# Patient Record
Sex: FEMALE | Race: WHITE | NOT HISPANIC OR LATINO | Employment: OTHER | URBAN - NONMETROPOLITAN AREA
[De-identification: names, ages, dates, MRNs, and addresses within clinical notes are randomized per-mention and may not be internally consistent; named-entity substitution may affect disease eponyms.]

---

## 2015-04-03 LAB — LDLC SERPL CALC-MCNC: 79 MG/DL

## 2016-11-11 LAB
CREAT SERPL-MCNC: 1.12 MG/DL (ref 0.4–1)
GLUCOSE SERPL-MCNC: 185 MG/DL (ref 70–100)
POTASSIUM SERPL-SCNC: 4.2 MEQ/L (ref 3.4–5.1)

## 2017-01-19 ENCOUNTER — HOSPITAL ENCOUNTER (EMERGENCY)
Facility: HOSPITAL | Age: 52
Discharge: HOME | End: 2017-01-19
Attending: NURSE PRACTITIONER | Admitting: NURSE PRACTITIONER
Payer: MEDICARE

## 2017-01-19 VITALS
SYSTOLIC BLOOD PRESSURE: 149 MMHG | DIASTOLIC BLOOD PRESSURE: 76 MMHG | RESPIRATION RATE: 18 BRPM | HEART RATE: 99 BPM | TEMPERATURE: 98.2 F | OXYGEN SATURATION: 94 %

## 2017-01-19 DIAGNOSIS — R21 RASH: ICD-10-CM

## 2017-01-19 PROCEDURE — 99213 OFFICE O/P EST LOW 20 MIN: CPT | Performed by: NURSE PRACTITIONER

## 2017-01-19 PROCEDURE — 99212 OFFICE O/P EST SF 10 MIN: CPT

## 2017-01-19 RX ORDER — TRIAMCINOLONE ACETONIDE 1 MG/G
CREAM TOPICAL
Qty: 80 G | Refills: 0 | Status: SHIPPED | OUTPATIENT
Start: 2017-01-19 | End: 2017-02-02

## 2017-01-19 ASSESSMENT — ENCOUNTER SYMPTOMS
VOMITING: 0
APPETITE CHANGE: 0
ACTIVITY CHANGE: 0
NAUSEA: 0
FEVER: 0
PSYCHIATRIC NEGATIVE: 1
DYSURIA: 0
CHILLS: 0

## 2017-01-19 NOTE — ED PROVIDER NOTES
History     Chief Complaint   Patient presents with     Rash     The history is provided by the patient and a caregiver. No  was used.     Marly Cannon is a 51 year old female who presents with a rash to bilateral forearms. Rash started 3 weeks ago. She saw her PCP and was started on Hydrocortisone cream twice a day. 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 She doesn't always wear long sleeves and feels that may be a reason for irritation. Denies new lotions, creams, detergents, or foods. Rash is itchy. Denies fever, chills, or night sweats. Eating and drinking well. Bowel and bladder are working well.     I have reviewed the Medications, Allergies, Past Medical and Surgical History, and Social History in the Epic system.    Review of Systems   Constitutional: Negative for fever, chills, activity change and appetite change.   Gastrointestinal: Negative for nausea and vomiting.   Genitourinary: Negative for dysuria.   Skin: Positive for rash.   Psychiatric/Behavioral: Negative.        Physical Exam   BP: 149/76 mmHg  Pulse: 99  Temp: 98.2  F (36.8  C)  Resp: 18  SpO2: 94 %  Physical Exam   Constitutional: She is oriented to person, place, and time. She appears well-developed and well-nourished. No distress.   HENT:   Mouth/Throat: Oropharynx is clear and moist.   Neck: Normal range of motion. Neck supple.   Cardiovascular: Normal rate, regular rhythm, normal heart sounds and intact distal pulses.    Pulmonary/Chest: Effort normal. No respiratory distress. She has no wheezes. She has no rales.   Abdominal: Soft. She exhibits no distension.   Musculoskeletal: Normal range of motion.   Neurological: She is alert and oriented to person, place, and time.   Skin: Skin is warm and dry. Rash noted. She is not diaphoretic.   Papular erythema diffuse rash to bilateral posterior forearms. No drainage or warmth to the touch. Abrasions noted from scratching.    Psychiatric: She has a normal mood and affect.  Her behavior is normal.   Nursing note and vitals reviewed.      ED Course   Procedures      Assessments & Plan (with Medical Decision Making)     I have reviewed the nursing notes.    I have reviewed the findings, diagnosis, plan and need for follow up with the patient.  Discharged instable condition.     Discharge Medication List as of 1/19/2017 10:34 AM      START taking these medications    Details   triamcinolone (KENALOG) 0.1 % cream 80 gramsDisp-80 g, [id number]             Final diagnoses:   Rash     Stop using hydrocortisone cream.   Use Triamcinolone cream daily. Apply a thin layer.   You can take tylenol and or ibuprofen for pain.   Follow up with PCP in 1 week for evaluation.   Return to urgent care or emergency department with an increase in symptoms or concerns.     SHANI Anand  1/19/2017  10:22 AM  URGENT CARE CLINIC        Makenna Aceves NP  01/22/17 1800

## 2017-01-19 NOTE — ED AVS SNAPSHOT
[facility]    [address] [address]    Phone:  [phone]                                       Marly Cannon   MRN: [id number]    Department:  [facility]   Date of Visit:  1/19/2017           Patient Information     Date Of Birth          1965        Your diagnoses for this visit were:     Rash        You were seen by Makenna Aceves NP.      Follow-up Information     Follow up with Amberly Whyte NP In 1 week.    Why:  For re-evaluation.     Contact information:     [facility]  [address] [place] [address] [id number] [phone]          Follow up with [facility].    Specialty:  EMERGENCY MEDICINE    Why:  As needed, If symptoms worsen    Contact information:    [address] [address] [phone]    Additional information:    From [facility]: Take [address]. Turn left at [address]. Turn left at the first stoplight on [address] [place] [address]. At the first stop sign, take a right onto [address]. Take a left into the parking lot and continue through until you reach the North enterance of the building.       From [place]: Take [address]. Take the [address] ramp towards [place]. Turn left onto [address]. Take a slight right onto [address]. Turn left at the first stoplight on [address]. At the first stop sign, take a right onto [address]. Take a left into the parking lot and continue through until you reach the North enterance of the building.       From [place]: Take [address]. Take a right at [address]. At the first stop sign, take a right onto [address]. Take a left into the parking lot and continue through until you reach the North enterance of the building.         Discharge Instructions       Stop using hydrocortisone cream.   Use Triamcinolone cream daily. Apply a thin layer.   You can take tylenol and or ibuprofen for  pain.   Follow up with PCP in 1 week for evaluation.   Return to urgent care or emergency department with an increase in symptoms or concerns.     Discharge References/Attachments     SKIN RASHES, SELF-CARE FOR (ENGLISH)      Future Appointments        Provider Department Dept Phone Center    3/10/2017 9:00 AM Courtney Chaudhary NP [facility] [phone] [place]         Review of your medicines      START taking        Dose / Directions Last dose taken    triamcinolone 0.1 % cream   Commonly known as:  KENALOG   Quantity:  80 g        80 grams   Refills:  0          Our records show that you are taking the medicines listed below. If these are incorrect, please call your family doctor or clinic.        Dose / Directions Last dose taken    ACTOS 15 MG tablet   Dose:  15 mg   Generic drug:  pioglitazone        Take 15 mg by mouth daily   Refills:  0        AMLODIPINE BESYLATE PO   Dose:  2.5 mg        Take 2.5 mg by mouth daily   Refills:  0        ASPIRIN EC PO   Dose:  81 mg        Take 81 mg by mouth daily   Refills:  0        CLARITIN PO        Refills:  0        DEPO-PROVERA IM        Refills:  0        GLIPIZIDE XL PO   Dose:  5 mg        Take 5 mg by mouth 2 times daily   Refills:  0        HYDROCHLOROTHIAZIDE PO   Dose:  25 mg        Take 25 mg by mouth daily   Refills:  0        LISINOPRIL PO   Dose:  40 mg        Take 40 mg by mouth daily   Refills:  0        metFORMIN 500 MG tablet   Commonly known as:  GLUCOPHAGE   Dose:  500 mg        Take 500 mg by mouth 2 times daily (with meals)   Refills:  0        mometasone-formoterol 100-5 MCG/ACT oral inhaler   Commonly known as:  DULERA   Dose:  2 puff        Inhale 2 puffs into the lungs as needed   Refills:  0        predniSONE 20 MG tablet   Commonly known as:  DELTASONE   Quantity:  5 tablet        Take 1 tablet daily for 5 days   Refills:  0        PRIMIDONE PO   Dose:  50 mg        Take 50 mg by mouth At Bedtime   Refills:  0         "SIMVASTATIN PO   Dose:  40 mg        Take 40 mg by mouth At Bedtime   Refills:  0        TYLENOL PO        Refills:  0        VITAMIN D3 PO   Dose:  2000 Units        Take 2,000 Units by mouth daily   Refills:  0                Prescriptions were sent or printed at these locations (1 Prescription)                   [facility] - [address]   [address] [address]    Telephone:  [phone]   Fax:  [phone]   Hours:                  E-Prescribed (1 of 1)         triamcinolone (KENALOG) 0.1 % cream                Orders Needing Specimen Collection     None      Pending Results     No orders found from 2017 to 2017.            Pending Culture Results     No orders found from 2017 to 2017.            Thank you for choosing [facility]       Thank you for choosing [facility] for your care. Our goal is always to provide you with excellent care. Hearing back from our patients is one way we can continue to improve our services. Please take a few minutes to complete the written survey that you may receive in the mail after you visit with us. Thank you!        Quvium Information     Quvium lets you send messages to your doctor, view your test results, renew your prescriptions, schedule appointments and more. To sign up, go to [email or web address] . Click on \"Log in\" on the left side of the screen, which will take you to the Welcome page. Then click on \"Sign up Now\" on the right side of the page.     You will be asked to enter the access code listed below, as well as some personal information. Please follow the directions to create your username and password.     Your access code is: [email or web address]  Expires: 2017 10:34 AM     Your access code will  in 90 days. If you need help or a new code, please call your [facility] clinic or [phone].        Care EveryWhere ID     This is your Care EveryWhere ID. This could be used by other organizations to " access your [place] medical records  [id number]        After Visit Summary       This is your record. Keep this with you and show to your community pharmacist(s) and doctor(s) at your next visit.

## 2017-01-19 NOTE — ED AVS SNAPSHOT
[facility]    [address] [address]    Phone:  [phone]                                       Marly Cannno   MRN: [id number]    Department:  [facility]   Date of Visit:  1/19/2017           After Visit Summary Signature Page     I have received my discharge instructions, and my questions have been answered. I have discussed any challenges I see with this plan with the nurse or doctor.    ..........................................................................................................................................  Patient/Patient Representative Signature      ..........................................................................................................................................  Patient Representative Print Name and Relationship to Patient    ..................................................               ................................................  Date                                            Time    ..........................................................................................................................................  Reviewed by Signature/Title    ...................................................              ..............................................  Date                                                            Time

## 2017-01-22 NOTE — DISCHARGE INSTRUCTIONS
Stop using hydrocortisone cream.   Use Triamcinolone cream daily. Apply a thin layer.   You can take tylenol and or ibuprofen for pain.   Follow up with PCP in 1 week for evaluation.   Return to urgent care or emergency department with an increase in symptoms or concerns.

## 2017-02-10 ENCOUNTER — TRANSFERRED RECORDS (OUTPATIENT)
Dept: HEALTH INFORMATION MANAGEMENT | Facility: HOSPITAL | Age: 52
End: 2017-02-10

## 2017-02-10 LAB
CHOLEST SERPL-MCNC: 165 MG/DL (ref 114–200)
HBA1C MFR BLD: 6.9 % (ref 4–6)
HBA1C MFR BLD: 7.7 % (ref 0–5.7)
HDLC SERPL-MCNC: 44 MG/DL (ref 40–60)
TRIGL SERPL-MCNC: 155 MG/DL (ref 10–200)

## 2017-02-28 ENCOUNTER — TELEPHONE (OUTPATIENT)
Dept: WOUND CARE | Facility: OTHER | Age: 52
End: 2017-02-28

## 2017-02-28 NOTE — TELEPHONE ENCOUNTER
Called [facility] and spoke to Jackelyn.      Marly will see Kat Dawn RN CDE tomorrow (3/1/17) at 0900.      They are aware to arrive 15 minutes prior to her appointment.      Courtney Chaudhary RN FNP-BC  Disease Management

## 2017-02-28 NOTE — TELEPHONE ENCOUNTER
[facility] stated he was suposed to call if levels went over 200.  He reports patient's levels are 205. Please call

## 2017-03-01 ENCOUNTER — HOSPITAL ENCOUNTER (OUTPATIENT)
Dept: EDUCATION SERVICES | Facility: HOSPITAL | Age: 52
Discharge: HOME | End: 2017-03-01
Attending: NURSE PRACTITIONER | Admitting: NURSE PRACTITIONER
Payer: MEDICARE

## 2017-03-01 VITALS
WEIGHT: 221 LBS | BODY MASS INDEX: 37.73 KG/M2 | SYSTOLIC BLOOD PRESSURE: 123 MMHG | HEIGHT: 64 IN | OXYGEN SATURATION: 89 % | HEART RATE: 94 BPM | DIASTOLIC BLOOD PRESSURE: 85 MMHG

## 2017-03-01 PROCEDURE — G0108 DIAB MANAGE TRN  PER INDIV: HCPCS

## 2017-03-01 ASSESSMENT — ANXIETY QUESTIONNAIRES
GAD7 TOTAL SCORE: 6
5. BEING SO RESTLESS THAT IT IS HARD TO SIT STILL: NEARLY EVERY DAY
2. NOT BEING ABLE TO STOP OR CONTROL WORRYING: NOT AT ALL
3. WORRYING TOO MUCH ABOUT DIFFERENT THINGS: NOT AT ALL
7. FEELING AFRAID AS IF SOMETHING AWFUL MIGHT HAPPEN: NOT AT ALL
1. FEELING NERVOUS, ANXIOUS, OR ON EDGE: NEARLY EVERY DAY
IF YOU CHECKED OFF ANY PROBLEMS ON THIS QUESTIONNAIRE, HOW DIFFICULT HAVE THESE PROBLEMS MADE IT FOR YOU TO DO YOUR WORK, TAKE CARE OF THINGS AT HOME, OR GET ALONG WITH OTHER PEOPLE: VERY DIFFICULT
6. BECOMING EASILY ANNOYED OR IRRITABLE: NOT AT ALL

## 2017-03-01 ASSESSMENT — PATIENT HEALTH QUESTIONNAIRE - PHQ9: 5. POOR APPETITE OR OVEREATING: NOT AT ALL

## 2017-03-01 ASSESSMENT — PAIN SCALES - GENERAL: PAINLEVEL: NO PAIN (0)

## 2017-03-01 NOTE — PROGRESS NOTES
"Marly is here for a diabetes review. /85 (BP Location: Right arm, Patient Position: Chair, Cuff Size: Adult Large)  Pulse 94  Ht 1.626 m (5' 4\")  Wt 100.2 kg (221 lb)  SpO2 (!) 88%  BMI 37.93 kg/m2  She is accompanied by her worker, Nancy. Marly is here as BG readings have been creeping upward. She is taking Metformin 1000 mg bid, Actos 15 mg daily and Glipizide recently increased 2/10/17 to 10 mg bid. This appointment was scheduled as BG readings have continued to trend upward. Readings range as follows: fastin-182, 205, pre-lunch: 174 and post-supper 177. FBG levels for the last week have been 150 or above. Leonor mejias  Lab Results   Component Value Date    A1C 7.7 02/10/2017    A1C 6.6 2016    A1C 6.8 2016    A1C 6.5 10/30/2015    A1C 8.1 2015     Of note is that Marly's O2 saturation is at 88%. Recheck done at the end of visit and it is at 88 to 90%. Her lips are slightly blue in color. She does continue to smoke.    Review of eating:  Seldom eats breakfast - will have coffee with artificial sweetener with creamer that contains sugar  Lunch is at work: usually Hot Pockets with water or pop  Supper: pasta with meat sauce or tuna salad with noodles, drinks water  States has stopped snacking  Beverages are water and 2 pops per day. She is working on decreasing to one pop a day.    Did discuss some suggested changes for lunch selections at work that may have have less carbs and salt. Nancy states that she can help Marly with this. I did give Marly a diabetes cookbook and Nancy a website for some food suggestions and recipes for Marly to use for cooking meals. Also encouraged her to keep working on decreasing/stopping pop.    I did ask Marly to keep food logs for our next visit.    With fasting BG increasing, I will contact Amberly Whyte about a medication change. Increase Actos? Or add basal insulin?    Will follow with Marly in one month.    Time spent with " patient 45 minutes.

## 2017-03-02 ASSESSMENT — PATIENT HEALTH QUESTIONNAIRE - PHQ9: SUM OF ALL RESPONSES TO PHQ QUESTIONS 1-9: 3

## 2017-03-02 ASSESSMENT — ANXIETY QUESTIONNAIRES: GAD7 TOTAL SCORE: 6

## 2017-03-09 ENCOUNTER — TELEPHONE (OUTPATIENT)
Dept: EDUCATION SERVICES | Facility: HOSPITAL | Age: 52
End: 2017-03-09

## 2017-03-09 DIAGNOSIS — E11.65 TYPE 2 DIABETES MELLITUS WITH HYPERGLYCEMIA, WITHOUT LONG-TERM CURRENT USE OF INSULIN (H): Primary | ICD-10-CM

## 2017-03-09 RX ORDER — PIOGLITAZONEHYDROCHLORIDE 30 MG/1
30 TABLET ORAL DAILY
Qty: 30 TABLET | Refills: 3 | Status: SHIPPED
Start: 2017-03-09 | End: 2017-12-05

## 2017-03-09 NOTE — TELEPHONE ENCOUNTER
Received faxed, signed order from Amberly Whyte NP, to increase Marly's Actos to 30 mg daily. Notified Marly by phone and Nancy by fax.

## 2017-06-06 DIAGNOSIS — E11.65 TYPE 2 DIABETES MELLITUS WITH HYPERGLYCEMIA, WITHOUT LONG-TERM CURRENT USE OF INSULIN (H): ICD-10-CM

## 2017-06-06 RX ORDER — PIOGLITAZONEHYDROCHLORIDE 30 MG/1
30 TABLET ORAL DAILY
Qty: 30 TABLET | Refills: 3 | Status: CANCELLED | OUTPATIENT
Start: 2017-06-06

## 2017-06-06 NOTE — TELEPHONE ENCOUNTER
Last office visit 09/09/16.  Actos last filled 05/16/17 #30. Patient due for diabetic follow up. No appointment made in EPIC. Please advise. Medication pended.

## 2017-06-06 NOTE — TELEPHONE ENCOUNTER
Sent message to [facility] to schedule patient for Diabetes Ed appointment.

## 2017-06-15 ENCOUNTER — TELEPHONE (OUTPATIENT)
Dept: WOUND CARE | Facility: OTHER | Age: 52
End: 2017-06-15

## 2017-06-15 NOTE — TELEPHONE ENCOUNTER
----- Message from Kat Dawn RN sent at 6/6/2017  2:22 PM CDT -----  Regarding: Schedule patient appointment  Needs refill for diabetes medication. Please call patient to schedule appointment with me for diabetes review so we can refill her prescription.

## 2017-06-15 NOTE — TELEPHONE ENCOUNTER
After several attempts of calling the patient with no answer/invalid phone number mailed out an appointment reminder for the patient to call to set up this appointment.

## 2017-07-10 ENCOUNTER — TRANSFERRED RECORDS (OUTPATIENT)
Dept: HEALTH INFORMATION MANAGEMENT | Facility: HOSPITAL | Age: 52
End: 2017-07-10

## 2017-07-10 LAB — HBA1C MFR BLD: 7.3 % (ref 4–6)

## 2017-08-01 ENCOUNTER — TRANSFERRED RECORDS (OUTPATIENT)
Dept: HEALTH INFORMATION MANAGEMENT | Facility: HOSPITAL | Age: 52
End: 2017-08-01

## 2017-08-03 ENCOUNTER — TELEPHONE (OUTPATIENT)
Dept: EDUCATION SERVICES | Facility: HOSPITAL | Age: 52
End: 2017-08-03

## 2017-08-03 DIAGNOSIS — E11.65 TYPE 2 DIABETES MELLITUS WITH HYPERGLYCEMIA, WITHOUT LONG-TERM CURRENT USE OF INSULIN (H): Primary | ICD-10-CM

## 2017-08-10 ENCOUNTER — HOSPITAL ENCOUNTER (OUTPATIENT)
Dept: EDUCATION SERVICES | Facility: HOSPITAL | Age: 52
Discharge: HOME | End: 2017-08-10
Attending: NURSE PRACTITIONER | Admitting: NURSE PRACTITIONER
Payer: MEDICARE

## 2017-08-10 VITALS
HEART RATE: 82 BPM | OXYGEN SATURATION: 91 % | SYSTOLIC BLOOD PRESSURE: 123 MMHG | WEIGHT: 220 LBS | DIASTOLIC BLOOD PRESSURE: 84 MMHG | BODY MASS INDEX: 37.56 KG/M2 | HEIGHT: 64 IN

## 2017-08-10 PROCEDURE — G0108 DIAB MANAGE TRN  PER INDIV: HCPCS

## 2017-08-10 ASSESSMENT — PAIN SCALES - GENERAL: PAINLEVEL: NO PAIN (0)

## 2017-08-10 NOTE — PROGRESS NOTES
"Marly is here for diabetes review and a possible insulin start. /84 (BP Location: Right arm, Patient Position: Chair)  Pulse 82  Ht 1.626 m (5' 4\")  Wt 99.8 kg (220 lb)  SpO2 (!) 91%  BMI 37.76 kg/m2 She is accompanied by Nancy who helps her with her self cares. Marly is taking Metformin 500 mg bid, Actos 30 mg daily and Glipizide 5 mg bid. Readings range as follows: fastin-165 (average 127) and pre-supper: 91, 135, 146-184.    Last A1C: 7/10/17: 7.3    17 Creatinine: 1.35 and GFR: 43.    Marly's provider, Amberly Whyte NP, is looking at starting Marly on a basal insulin.    Marly has been working on her food choices and portions. Reviewed her food log. Breakfast: cereal/milk or oatmeal/toast/coffee, lunch: Dinty Domínguez stew/ 2 slices of bread or TV dinner/diet coke. Evening meal: hot dog with mac & cheese, spam sandwich/sprite or corned beef hash/diet Mt Dew. Vanilla pudding for bedtime snack.    I did instruct Marly how to inject insulin. Instructed on storage of insulin, disposal of insulin pen needles, injection site selection/rotation, how to prepare pen. Marly was somewhat apprehensive but was able to do a return demonstration with minimal prompting.    Discussed with Marly and Nancy that I will contact Amberly Whyte NP, about the plan we will take.  We may need to stop Metformin for about a week before starting insulin.    Will follow with Marly once I talk with Najma Whyte NP.    Time spent with patient 45 minutes  "

## 2017-08-10 NOTE — NURSING NOTE
"Chief Complaint   Patient presents with     Diabetes     annual       Initial /84 (BP Location: Right arm, Patient Position: Chair)  Pulse 82  Ht 1.626 m (5' 4\")  Wt 99.8 kg (220 lb)  SpO2 (!) 91%  BMI 37.76 kg/m2 Estimated body mass index is 37.76 kg/(m^2) as calculated from the following:    Height as of this encounter: 1.626 m (5' 4\").    Weight as of this encounter: 99.8 kg (220 lb).  Medication Reconciliation: complete   Lisa Back      "

## 2017-09-01 ENCOUNTER — TELEPHONE (OUTPATIENT)
Dept: EDUCATION SERVICES | Facility: HOSPITAL | Age: 52
End: 2017-09-01

## 2017-09-01 NOTE — TELEPHONE ENCOUNTER
Kat received orders from patients primary provider, Amberly Whyte.  Diabetic nurse was attempting to reach Nancy- worker from [facility] to give her the information. Had to leave message to return call to Diabetic Education.

## 2017-10-16 ENCOUNTER — TELEPHONE (OUTPATIENT)
Dept: EDUCATION SERVICES | Facility: HOSPITAL | Age: 52
End: 2017-10-16

## 2017-10-16 DIAGNOSIS — E11.65 TYPE 2 DIABETES MELLITUS WITH HYPERGLYCEMIA, WITHOUT LONG-TERM CURRENT USE OF INSULIN (H): ICD-10-CM

## 2017-10-16 NOTE — TELEPHONE ENCOUNTER
Calling to speak to Kat regarding going on an insulin needle and getting off of her Metformin. Patient primary told her to contact Kat.

## 2017-10-19 NOTE — TELEPHONE ENCOUNTER
Called Marly. She had been worried about high BG readings on 10/14/17 that were in the 200s during the day but were down to 159 before bed. Readings since have been in 100s.    Discussed with her that we can wait until she has lab work again with Amberly Whyte NP, before we discuss insulin again. I will contact Amberly regarding seeing Marly after next A1C/Creat labs

## 2017-11-21 ENCOUNTER — TRANSFERRED RECORDS (OUTPATIENT)
Dept: HEALTH INFORMATION MANAGEMENT | Facility: HOSPITAL | Age: 52
End: 2017-11-21

## 2017-11-21 ENCOUNTER — TELEPHONE (OUTPATIENT)
Dept: EDUCATION SERVICES | Facility: HOSPITAL | Age: 52
End: 2017-11-21

## 2017-11-21 LAB
CREAT SERPL-MCNC: 1.47 MG/DL (ref 0.4–1)
GFR SERPL CREATININE-BSD FRML MDRD: 39 ML/MIN/1.73M2
GLUCOSE SERPL-MCNC: 142 MG/DL (ref 70–100)
HBA1C MFR BLD: 6.9 % (ref 4–6)
POTASSIUM SERPL-SCNC: 4.2 MEQ/L (ref 3.4–5.1)

## 2017-11-21 NOTE — TELEPHONE ENCOUNTER
Najma Whyte NP wants Kat Dawn to call her back on Wednesday. She wants Marly to get off of Metformin. Najma Whyte will faxing her note from today to Kat. Patient is set up to see Kat on Tuesday 11/28/17.

## 2017-11-23 NOTE — TELEPHONE ENCOUNTER
Called Amberly Whyte NP, will plan for Marly to start insulin. Appointment scheduled for next week.

## 2017-11-28 ENCOUNTER — TELEPHONE (OUTPATIENT)
Dept: EDUCATION SERVICES | Facility: HOSPITAL | Age: 52
End: 2017-11-28

## 2017-11-28 NOTE — TELEPHONE ENCOUNTER
Marly was a no show for her appointment today. She is to start insulin. Please call her to reschedule. Thanks!

## 2017-12-05 ENCOUNTER — HOSPITAL ENCOUNTER (OUTPATIENT)
Dept: EDUCATION SERVICES | Facility: HOSPITAL | Age: 52
Discharge: HOME | End: 2017-12-05
Attending: NURSE PRACTITIONER | Admitting: NURSE PRACTITIONER
Payer: MEDICARE

## 2017-12-05 VITALS
HEIGHT: 64 IN | HEART RATE: 88 BPM | DIASTOLIC BLOOD PRESSURE: 76 MMHG | OXYGEN SATURATION: 88 % | SYSTOLIC BLOOD PRESSURE: 114 MMHG | BODY MASS INDEX: 36.57 KG/M2 | WEIGHT: 214.2 LBS

## 2017-12-05 DIAGNOSIS — E11.65 TYPE 2 DIABETES MELLITUS WITH HYPERGLYCEMIA, WITHOUT LONG-TERM CURRENT USE OF INSULIN (H): Primary | ICD-10-CM

## 2017-12-05 PROCEDURE — G0108 DIAB MANAGE TRN  PER INDIV: HCPCS

## 2017-12-05 ASSESSMENT — PAIN SCALES - GENERAL: PAINLEVEL: NO PAIN (0)

## 2017-12-05 NOTE — NURSING NOTE
"Chief Complaint   Patient presents with     Diabetes     insulin start       Initial /76  Pulse 88  Ht 1.626 m (5' 4\")  Wt 97.2 kg (214 lb 3.2 oz)  SpO2 (!) 88%  BMI 36.77 kg/m2 Estimated body mass index is 36.77 kg/(m^2) as calculated from the following:    Height as of this encounter: 1.626 m (5' 4\").    Weight as of this encounter: 97.2 kg (214 lb 3.2 oz).  Medication Reconciliation: complete    "

## 2017-12-05 NOTE — IP AVS SNAPSHOT
MRN:[id number]                      After Visit Summary   12/5/2017    Marly Cannon    MRN: [id number]           Thank you!     Thank you for choosing [facility] for your care. Our goal is always to provide you with excellent care. Hearing back from our patients is one way we can continue to improve our services. Please take a few minutes to complete the written survey that you may receive in the mail after you visit with us. Thank you!        Patient Information     Date Of Birth          1965        About your hospital stay     You were admitted on:  December 5, 2017 You last received care in the:  [facility]    You were discharged on:  December 5, 2017       Who to Call     For medical emergencies, please call 911.  For non-urgent questions about your medical care, please call your primary care provider or clinic, [phone]          Attending Provider     Provider Specialty    Courtney Chaudhary NP Nurse Practitioner - Family       Primary Care Provider Office Phone # Fax #    Amberly ELISE Whyte [phone]      Your next 10 appointments already scheduled     Dec 19, 2017  9:00 AM CST   (Arrive by 8:45 AM)   Return Visit with Kat Dawn RN   [facility] ([facility] )    [address] [place] [address] [phone]              Further instructions from your care team       Start Lantus 10 units daily at 5 pm    Please test your blood sugar every am and 2 hours after your evening meal    Please call Kat if you have a low. [phone] ([facility])    I will call you next week to see how you are doing    Return to Diabetes Ed on 12/19/17 at 8:45 am    Pending Results     No orders found from 12/3/2017 to 12/6/2017.            Admission Information     Date & Time Provider Department Dept. Phone    12/5/2017 Courtney Chaudhary NP [facility] [phone]      Your Vitals Were     Blood Pressure Pulse Height Weight Pulse  "Oximetry BMI (Body Mass Index)    114/76 88 1.626 m (5' 4\") 97.2 kg (214 lb 3.2 oz) 88% 36.77 kg/m2      PackLink Information     PackLink lets you send messages to your doctor, view your test results, renew your prescriptions, schedule appointments and more. To sign up, go to [email or web address] . Click on \"Log in\" on the left side of the screen, which will take you to the Welcome page. Then click on \"Sign up Now\" on the right side of the page.     You will be asked to enter the access code listed below, as well as some personal information. Please follow the directions to create your username and password.     Your access code is: [email or web address]  Expires: 3/5/2018 10:38 AM     Your access code will  in 90 days. If you need help or a new code, please call your [facility] clinic or [phone].        Care EveryWhere ID     This is your Care EveryWhere ID. This could be used by other organizations to access your [facility] medical records  [id number]        Equal Access to Services     MIGUEL VIRGEN : Hadii melody Damon, derrick baptiste, qaraquel kaalmitchell martinez, chandra caceres . So [facility] [phone].    ATENCIÓN: Si habla español, tiene a crook disposición servicios gratuitos de asistencia lingüística. Llame al [phone].    We comply with applicable federal civil rights laws and [place] laws. We do not discriminate on the basis of race, color, national origin, age, disability, sex, sexual orientation, or gender identity.               Review of your medicines      UNREVIEWED medicines. Ask your doctor about these medicines        Dose / Directions    AMLODIPINE BESYLATE PO        Dose:  2.5 mg   Take 2.5 mg by mouth daily   Refills:  0       ASPIRIN EC PO        Dose:  81 mg   Take 81 mg by mouth daily   Refills:  0       CLARITIN PO        Refills:  0       DEPO-PROVERA IM        Refills:  0       GLIPIZIDE XL PO        Dose:  5 mg   Take 5 mg by mouth 2 times daily "   Refills:  0       HYDROCHLOROTHIAZIDE PO        Dose:  25 mg   Take 25 mg by mouth daily   Refills:  0       LISINOPRIL PO        Dose:  40 mg   Take 40 mg by mouth daily   Refills:  0       metFORMIN 500 MG tablet   Commonly known as:  GLUCOPHAGE        Dose:  500 mg   Take 500 mg by mouth daily   Refills:  0       PRIMIDONE PO        Dose:  50 mg   Take 50 mg by mouth At Bedtime   Refills:  0       SIMVASTATIN PO        Dose:  40 mg   Take 40 mg by mouth At Bedtime   Refills:  0       TYLENOL PO        Refills:  0                Protect others around you: Learn how to safely use, store and throw away your medicines at www.disposemymeds.org.             Medication List: This is a list of all your medications and when to take them. Check marks below indicate your daily home schedule. Keep this list as a reference.      Medications           Morning Afternoon Evening Bedtime As Needed    AMLODIPINE BESYLATE PO   Take 2.5 mg by mouth daily                                ASPIRIN EC PO   Take 81 mg by mouth daily                                CLARITIN PO                                DEPO-PROVERA IM                                GLIPIZIDE XL PO   Take 5 mg by mouth 2 times daily                                HYDROCHLOROTHIAZIDE PO   Take 25 mg by mouth daily                                LISINOPRIL PO   Take 40 mg by mouth daily                                metFORMIN 500 MG tablet   Commonly known as:  GLUCOPHAGE   Take 500 mg by mouth daily                                PRIMIDONE PO   Take 50 mg by mouth At Bedtime                                SIMVASTATIN PO   Take 40 mg by mouth At Bedtime                                TYLENOL PO

## 2017-12-05 NOTE — DISCHARGE INSTRUCTIONS
Start Lantus 10 units daily at 5 pm    Please test your blood sugar every am and 2 hours after your evening meal    Please call Kat if you have a low. [phone] (Lisa - mail)    I will call you next week to see how you are doing    Return to Diabetes Ed on 12/19/17 at 8:45 am

## 2017-12-05 NOTE — PROGRESS NOTES
"Marly is here to start insulin. /76  Pulse 88  Ht 1.626 m (5' 4\")  Wt 97.2 kg (214 lb 3.2 oz)  SpO2 (!) 88%  BMI 36.77 kg/m2 She is accompanied by her worker, Rush. Marly is taking Glipizide 5 mg bid and Metformin 500 mg daily. Readings range as follows: fatsin, 132-179, pre-supper: 167, 203, 209    Instructed Marly on the action of Lantus, storage of Lantus, how to use the insulin pen, injection site selection/rotation, disposal of insulin pen needles. Gave her written information on the Lantus pen instructions.    Marly was able to perform a return demonstration insulin injection using a demo pen and injection pillow with minimal prompting.    Reviewed hypoglycemia symptoms and treatment and recommended that Marly purchase some glucose tablets to use in case of low BG.    With discussion between Marly and Rush, they decided that Marly would take her Lantus daily at 5 pm          Plan:  Start Lantus 10 units daily at 5 pm    Please test your blood sugar every am and 2 hours after your evening meal    Please call Kat if you have a low. [phone] (Lisa - voicemail)    I will call you next week to see how you are doing    Return to Diabetes Ed on 17 at 8:45 am    Time spent with patient 45 minutes  "

## 2017-12-19 ENCOUNTER — HOSPITAL ENCOUNTER (OUTPATIENT)
Dept: EDUCATION SERVICES | Facility: HOSPITAL | Age: 52
Discharge: HOME | End: 2017-12-19
Attending: NURSE PRACTITIONER | Admitting: NURSE PRACTITIONER
Payer: MEDICARE

## 2017-12-19 DIAGNOSIS — E11.65 TYPE 2 DIABETES MELLITUS WITH HYPERGLYCEMIA, WITH LONG-TERM CURRENT USE OF INSULIN (H): Primary | ICD-10-CM

## 2017-12-19 DIAGNOSIS — Z79.4 TYPE 2 DIABETES MELLITUS WITH HYPERGLYCEMIA, WITH LONG-TERM CURRENT USE OF INSULIN (H): Primary | ICD-10-CM

## 2017-12-19 PROCEDURE — G0108 DIAB MANAGE TRN  PER INDIV: HCPCS

## 2017-12-19 ASSESSMENT — PAIN SCALES - GENERAL: PAINLEVEL: NO PAIN (0)

## 2017-12-19 NOTE — NURSING NOTE
"Chief Complaint   Patient presents with     Diabetes     insulin adjustment       Initial /86  Pulse 90  Ht 1.626 m (5' 4\")  Wt 97.8 kg (215 lb 8 oz)  SpO2 (!) 89%  BMI 36.99 kg/m2 Estimated body mass index is 36.99 kg/(m^2) as calculated from the following:    Height as of this encounter: 1.626 m (5' 4\").    Weight as of this encounter: 97.8 kg (215 lb 8 oz).  Medication Reconciliation: complete   Lisa Back      "

## 2017-12-19 NOTE — DISCHARGE INSTRUCTIONS
Increase Lantus to 12 units daily    Please test your blood glucose before breakfast and at bedtime    Return to Diabetes Ed on January 16, 2018 at 9:00 am    Please call Kat with any lows or concerns: [phone]

## 2017-12-19 NOTE — IP AVS SNAPSHOT
"                  MRN:[id number]                      After Visit Summary   12/19/2017    Marly Cannon    MRN: [id number]           Thank you!     Thank you for choosing [facility] for your care. Our goal is always to provide you with excellent care. Hearing back from our patients is one way we can continue to improve our services. Please take a few minutes to complete the written survey that you may receive in the mail after you visit with us. Thank you!        Patient Information     Date Of Birth          1965        About your hospital stay     You were admitted on:  December 19, 2017 You last received care in the:  [facility]    You were discharged on:  December 19, 2017       Who to Call     For medical emergencies, please call 911.  For non-urgent questions about your medical care, please call your primary care provider or clinic, [phone]          Attending Provider     Provider Specialty    Courtney Chaudhary NP Nurse Practitioner - Family       Primary Care Provider Office Phone # Fax #    Amberly ELISE Whyte [phone]      Further instructions from your care team       Increase Lantus to 12 units daily    Please test your blood glucose before breakfast and at bedtime    Return to Diabetes Ed on January 16, 2018 at 9:00 am    Please call Kat with any lows or concerns: [phone]    Pending Results     No orders found from 12/17/2017 to 12/20/2017.            Admission Information     Date & Time Provider Department Dept. Phone    12/19/2017 Courtney Chaudhary NP [facility] [phone]      Your Vitals Were     Blood Pressure Pulse Height Weight Pulse Oximetry BMI (Body Mass Index)    141/86 90 1.626 m (5' 4\") 97.8 kg (215 lb 8 oz) 89% 36.99 kg/m2      Neonode Information     Neonode lets you send messages to your doctor, view your test results, renew your prescriptions, schedule appointments and more. To sign up, go to [email or web address] . Click on \"Log in\" " "on the left side of the screen, which will take you to the Welcome page. Then click on \"Sign up Now\" on the right side of the page.     You will be asked to enter the access code listed below, as well as some personal information. Please follow the directions to create your username and password.     Your access code is: [email or web address]  Expires: 3/5/2018 10:38 AM     Your access code will  in 90 days. If you need help or a new code, please call your [place] clinic or [phone].        Care EveryWhere ID     This is your Care EveryWhere ID. This could be used by other organizations to access your [place] medical records  [id number]        Equal Access to Services     GRETTA VIRGEN : Iftikhar Damon, wajoaquín baptiste, qaraquel kaalmakya martinez, chandra caceres . So [facility] [phone].    ATENCIÓN: Si habla español, tiene a crook disposición servicios gratuitos de asistencia lingüística. Llame al [phone].    We comply with applicable federal civil rights laws and [place] laws. We do not discriminate on the basis of race, color, national origin, age, disability, sex, sexual orientation, or gender identity.               Review of your medicines      UNREVIEWED medicines. Ask your doctor about these medicines        Dose / Directions    AMLODIPINE BESYLATE PO        Dose:  2.5 mg   Take 2.5 mg by mouth daily   Refills:  0       ASPIRIN EC PO        Dose:  81 mg   Take 81 mg by mouth daily   Refills:  0       CLARITIN PO        Refills:  0       DEPO-PROVERA IM        Refills:  0       GLIPIZIDE XL PO        Dose:  5 mg   Take 5 mg by mouth 2 times daily   Refills:  0       HYDROCHLOROTHIAZIDE PO        Dose:  25 mg   Take 25 mg by mouth daily   Refills:  0       insulin glargine 100 UNIT/ML injection   Commonly known as:  LANTUS SOLOSTAR   Used for:  Type 2 diabetes mellitus with hyperglycemia, without long-term current use of insulin (H)        Dose:  10 Units   Inject " 10 Units Subcutaneous At Bedtime   Quantity:  15 mL   Refills:  3       LISINOPRIL PO        Dose:  40 mg   Take 40 mg by mouth daily   Refills:  0       metFORMIN 500 MG tablet   Commonly known as:  GLUCOPHAGE        Dose:  500 mg   Take 500 mg by mouth daily   Refills:  0       PRIMIDONE PO        Dose:  50 mg   Take 50 mg by mouth At Bedtime   Refills:  0       SIMVASTATIN PO        Dose:  40 mg   Take 40 mg by mouth At Bedtime   Refills:  0       TYLENOL PO        Refills:  0         CONTINUE these medicines which have NOT CHANGED        Dose / Directions    insulin pen needle 32G X 4 MM   Used for:  Type 2 diabetes mellitus with hyperglycemia, without long-term current use of insulin (H)        Use 1 pen needles daily   Quantity:  100 each   Refills:  10                Protect others around you: Learn how to safely use, store and throw away your medicines at www.disposemymeds.org.             Medication List: This is a list of all your medications and when to take them. Check marks below indicate your daily home schedule. Keep this list as a reference.      Medications           Morning Afternoon Evening Bedtime As Needed    AMLODIPINE BESYLATE PO   Take 2.5 mg by mouth daily                                ASPIRIN EC PO   Take 81 mg by mouth daily                                CLARITIN PO                                DEPO-PROVERA IM                                GLIPIZIDE XL PO   Take 5 mg by mouth 2 times daily                                HYDROCHLOROTHIAZIDE PO   Take 25 mg by mouth daily                                insulin glargine 100 UNIT/ML injection   Commonly known as:  LANTUS SOLOSTAR   Inject 10 Units Subcutaneous At Bedtime                                insulin pen needle 32G X 4 MM   Use 1 pen needles daily                                LISINOPRIL PO   Take 40 mg by mouth daily                                metFORMIN 500 MG tablet   Commonly known as:  GLUCOPHAGE   Take 500 mg by mouth  daily                                PRIMIDONE PO   Take 50 mg by mouth At Bedtime                                SIMVASTATIN PO   Take 40 mg by mouth At Bedtime                                TYLENOL PO

## 2017-12-20 VITALS
SYSTOLIC BLOOD PRESSURE: 134 MMHG | WEIGHT: 215.5 LBS | HEIGHT: 64 IN | HEART RATE: 90 BPM | DIASTOLIC BLOOD PRESSURE: 88 MMHG | BODY MASS INDEX: 36.79 KG/M2 | OXYGEN SATURATION: 89 %

## 2017-12-21 NOTE — PROGRESS NOTES
"Marly is here for BG review and insulin adjust. /88 (BP Location: Right arm, Patient Position: Sitting, Cuff Size: Adult Regular)  Pulse 90  Ht 1.626 m (5' 4\")  Wt 97.8 kg (215 lb 8 oz)  SpO2 (!) 89%  BMI 36.99 kg/m2. She is accompanied by her worker, Rush. Marly started taking Lantus 10 units daily 2 weeks ago. She is also taking Glipizide 5 mg bid and Metformin 500 mg daily. Readings range as follows: fastin-144, 153.Denies lows.    Rush reports that staff watched Marly inject her insulin the first couple of days that she started taking it. They reported that Marly did very well at doing her own injections. I praised Marly with her success.    Reviewed BG targets. She is open to increasing her Lantus dose by 2 more units daily.    I did discuss that we are hoping to stop her Metformin. I did ask Marly to be sure to test her BG at bedtime so we can see if we can stop it. She agrees to this.    Plan:  Increase Lantus to 12 units daily    Please test your blood glucose before breakfast and at bedtime    Return to Diabetes Ed on 2018 at 9:00 am    Please call Kat with any lows or concerns: [phone]    Time spent with patient 30 minutes.  "

## 2018-01-16 ENCOUNTER — HOSPITAL ENCOUNTER (OUTPATIENT)
Dept: EDUCATION SERVICES | Facility: HOSPITAL | Age: 53
End: 2018-01-16
Attending: NURSE PRACTITIONER
Payer: MEDICARE

## 2018-01-16 VITALS
DIASTOLIC BLOOD PRESSURE: 83 MMHG | BODY MASS INDEX: 36.52 KG/M2 | OXYGEN SATURATION: 87 % | HEIGHT: 64 IN | HEART RATE: 87 BPM | WEIGHT: 213.9 LBS | SYSTOLIC BLOOD PRESSURE: 135 MMHG

## 2018-01-16 DIAGNOSIS — Z79.4 TYPE 2 DIABETES MELLITUS WITH HYPERGLYCEMIA, WITH LONG-TERM CURRENT USE OF INSULIN (H): Primary | ICD-10-CM

## 2018-01-16 DIAGNOSIS — E11.65 TYPE 2 DIABETES MELLITUS WITH HYPERGLYCEMIA, WITH LONG-TERM CURRENT USE OF INSULIN (H): Primary | ICD-10-CM

## 2018-01-16 PROCEDURE — 99207 ZZC NO CHARGE DRC TIME CRITERIA NOT MET: CPT

## 2018-01-16 ASSESSMENT — PAIN SCALES - GENERAL: PAINLEVEL: NO PAIN (0)

## 2018-01-16 NOTE — DISCHARGE INSTRUCTIONS
Stop Metformin.    I will call in 2 weeks to see how BG readings are doing.    If AM readings go above 140 and PM readings go above 160, please call Kat.    Will follow in Diabetes Ed in 2 months after your next A1C.

## 2018-01-16 NOTE — NURSING NOTE
"Chief Complaint   Patient presents with     Diabetes     insulin adjustment       Initial /83  Pulse 87  Ht 1.626 m (5' 4\")  Wt 97 kg (213 lb 14.4 oz)  SpO2 (!) 87%  BMI 36.72 kg/m2 Estimated body mass index is 36.72 kg/(m^2) as calculated from the following:    Height as of this encounter: 1.626 m (5' 4\").    Weight as of this encounter: 97 kg (213 lb 14.4 oz).  Medication Reconciliation: complete   Lisa Back      "

## 2018-01-16 NOTE — PROGRESS NOTES
"Marly is here for BG review and insulin adjust. /83  Pulse 87  Ht 1.626 m (5' 4\")  Wt 97 kg (213 lb 14.4 oz)  SpO2 (!) 87%  BMI 36.72 kg/m2. She is accompanied by her worker, Rush. Marly is taking Lantus 12 units daily, Glipizide 5 mg bid and Metformin 500 mg daily. Readings range as follows: fastin-137 and post-supper: 87, 114-154, 169, 176. .Denies lows.    Discussed with Marly that her BG readings look great. Will stop Metformin today.      Plan:  Stop Metformin.    I will call in 2 weeks to see how BG readings are doing.    If AM readings go above 140 and PM readings go above 160, please call Kat.    Will follow in Diabetes Ed in 2 months after your next A1C.      Time spent with patient 15 minutes.  "

## 2018-01-29 ENCOUNTER — TRANSFERRED RECORDS (OUTPATIENT)
Dept: HEALTH INFORMATION MANAGEMENT | Facility: CLINIC | Age: 53
End: 2018-01-29

## 2018-01-30 ENCOUNTER — TELEPHONE (OUTPATIENT)
Dept: EDUCATION SERVICES | Facility: HOSPITAL | Age: 53
End: 2018-01-30

## 2018-01-30 NOTE — TELEPHONE ENCOUNTER
Called Marly. She is taking Lantus 12 units daily and Glipizide 5 mg bid. She reports morning readings 99-120s and post-supper: 120-160 with one high of 224. No change in medications. Will follow in March afetr her next A1C.

## 2018-02-12 LAB — HBA1C MFR BLD: 6.9 % (ref 0–5.7)

## 2018-03-13 ENCOUNTER — TELEPHONE (OUTPATIENT)
Dept: EDUCATION SERVICES | Facility: HOSPITAL | Age: 53
End: 2018-03-13

## 2018-03-13 NOTE — TELEPHONE ENCOUNTER
Nancy from [facility] called to confirm Marly's diabetes medications/dosages. Reminded Nancy that Marly has an appointment to see me on 3/19.

## 2018-03-19 ENCOUNTER — HOSPITAL ENCOUNTER (OUTPATIENT)
Dept: EDUCATION SERVICES | Facility: HOSPITAL | Age: 53
Discharge: HOME | End: 2018-03-19
Attending: NURSE PRACTITIONER | Admitting: NURSE PRACTITIONER
Payer: MEDICARE

## 2018-03-19 VITALS
HEIGHT: 64 IN | BODY MASS INDEX: 37.1 KG/M2 | SYSTOLIC BLOOD PRESSURE: 135 MMHG | HEART RATE: 88 BPM | WEIGHT: 217.3 LBS | OXYGEN SATURATION: 91 % | DIASTOLIC BLOOD PRESSURE: 91 MMHG

## 2018-03-19 DIAGNOSIS — E11.65 TYPE 2 DIABETES MELLITUS WITH HYPERGLYCEMIA, WITH LONG-TERM CURRENT USE OF INSULIN (H): Primary | ICD-10-CM

## 2018-03-19 DIAGNOSIS — Z79.4 TYPE 2 DIABETES MELLITUS WITH HYPERGLYCEMIA, WITH LONG-TERM CURRENT USE OF INSULIN (H): Primary | ICD-10-CM

## 2018-03-19 PROCEDURE — G0108 DIAB MANAGE TRN  PER INDIV: HCPCS

## 2018-03-19 ASSESSMENT — PAIN SCALES - GENERAL: PAINLEVEL: NO PAIN (0)

## 2018-03-19 NOTE — DISCHARGE INSTRUCTIONS
Increase Lantus to 15 units daily at 5 pm.    I will call you on Monday, 3/26/18, late afternoon for BG readings.    Please return to Diabetes Ed in 3 months.

## 2018-03-19 NOTE — IP AVS SNAPSHOT
"                  MRN:[id number]                      After Visit Summary   3/19/2018    Marly Cannon    MRN: [id number]           Thank you!     Thank you for choosing [facility] for your care. Our goal is always to provide you with excellent care. Hearing back from our patients is one way we can continue to improve our services. Please take a few minutes to complete the written survey that you may receive in the mail after you visit with us. Thank you!        Patient Information     Date Of Birth          1965        About your hospital stay     You were admitted on:  March 19, 2018 You last received care in the:  [facility]    You were discharged on:  March 19, 2018       Who to Call     For medical emergencies, please call 911.  For non-urgent questions about your medical care, please call your primary care provider or clinic, [phone]          Attending Provider     Provider Specialty    Courtney Chaudhary NP Nurse Practitioner - Family       Primary Care Provider Office Phone # Fax #    Amberly Whyte [phone]      Further instructions from your care team       Increase Lantus to 15 units daily at 5 pm.    I will call you on Monday, 3/26/18, late afternoon for BG readings.    Please return to Diabetes Ed in 3 months.    Pending Results     No orders found from 3/17/2018 to 3/20/2018.            Admission Information     Date & Time Provider Department Dept. Phone    3/19/2018 Courtney Chaudhary, [facility] [phone]      Your Vitals Were     Blood Pressure Pulse Height Weight Pulse Oximetry BMI (Body Mass Index)    135/91 88 1.626 m (5' 4\") 98.6 kg (217 lb 4.8 oz) 91% 37.3 kg/m2      uTest Information     uTest lets you send messages to your doctor, view your test results, renew your prescriptions, schedule appointments and more. To sign up, go to [email or web address] . Click on \"Log in\" on the left side of the screen, which will take you to the " "Welcome page. Then click on \"Sign up Now\" on the right side of the page.     You will be asked to enter the access code listed below, as well as some personal information. Please follow the directions to create your username and password.     Your access code is: [email or web address]  Expires: 2018  9:32 AM     Your access code will  in 90 days. If you need help or a new code, please call your [place] clinic or [phone].        Care EveryWhere ID     This is your Care EveryWhere ID. This could be used by other organizations to access your [place] medical records  [id number]        Equal Access to Services     [facility] Center[facility] : Iftikhar Damon, derrick baptiste, sandy martinez, chandra caceres . So [facility] [phone].    ATENCIÓN: Si habla español, tiene a crook disposición servicios gratuitos de asistencia lingüística. Llame al [phone].    We comply with applicable federal civil rights laws and [place] laws. We do not discriminate on the basis of race, color, national origin, age, disability, sex, sexual orientation, or gender identity.               Review of your medicines      UNREVIEWED medicines. Ask your doctor about these medicines        Dose / Directions    AMLODIPINE BESYLATE PO        Dose:  2.5 mg   Take 2.5 mg by mouth daily   Refills:  0       ASPIRIN EC PO        Dose:  81 mg   Take 81 mg by mouth daily   Refills:  0       CLARITIN PO        Refills:  0       DEPO-PROVERA IM        Refills:  0       GLIPIZIDE XL PO        Dose:  5 mg   Take 5 mg by mouth 2 times daily   Refills:  0       HYDROCHLOROTHIAZIDE PO        Dose:  25 mg   Take 25 mg by mouth daily   Refills:  0       insulin glargine 100 UNIT/ML injection   Commonly known as:  LANTUS SOLOSTAR   Used for:  Type 2 diabetes mellitus with hyperglycemia, without long-term current use of insulin (H)        Dose:  10 Units   Inject 10 Units Subcutaneous At Bedtime   Quantity:  15 mL "   Refills:  3       LISINOPRIL PO        Dose:  40 mg   Take 40 mg by mouth daily   Refills:  0       PRIMIDONE PO        Dose:  50 mg   Take 50 mg by mouth At Bedtime   Refills:  0       SIMVASTATIN PO        Dose:  40 mg   Take 40 mg by mouth At Bedtime   Refills:  0       TYLENOL PO        Refills:  0         CONTINUE these medicines which have NOT CHANGED        Dose / Directions    insulin pen needle 32G X 4 MM   Used for:  Type 2 diabetes mellitus with hyperglycemia, without long-term current use of insulin (H)        Use 1 pen needles daily   Quantity:  100 each   Refills:  10                Protect others around you: Learn how to safely use, store and throw away your medicines at www.disposemymeds.org.             Medication List: This is a list of all your medications and when to take them. Check marks below indicate your daily home schedule. Keep this list as a reference.      Medications           Morning Afternoon Evening Bedtime As Needed    AMLODIPINE BESYLATE PO   Take 2.5 mg by mouth daily                                ASPIRIN EC PO   Take 81 mg by mouth daily                                CLARITIN PO                                DEPO-PROVERA IM                                GLIPIZIDE XL PO   Take 5 mg by mouth 2 times daily                                HYDROCHLOROTHIAZIDE PO   Take 25 mg by mouth daily                                insulin glargine 100 UNIT/ML injection   Commonly known as:  LANTUS SOLOSTAR   Inject 10 Units Subcutaneous At Bedtime                                insulin pen needle 32G X 4 MM   Use 1 pen needles daily                                LISINOPRIL PO   Take 40 mg by mouth daily                                PRIMIDONE PO   Take 50 mg by mouth At Bedtime                                SIMVASTATIN PO   Take 40 mg by mouth At Bedtime                                TYLENOL PO

## 2018-03-19 NOTE — NURSING NOTE
"Chief Complaint   Patient presents with     Diabetes     insulin adjustment       Initial /91  Pulse 88  Ht 1.626 m (5' 4\")  Wt 98.6 kg (217 lb 4.8 oz)  SpO2 (!) 91%  BMI 37.3 kg/m2 Estimated body mass index is 37.3 kg/(m^2) as calculated from the following:    Height as of this encounter: 1.626 m (5' 4\").    Weight as of this encounter: 98.6 kg (217 lb 4.8 oz).  Medication Reconciliation: complete   Lisa Back      "

## 2018-03-19 NOTE — PROGRESS NOTES
"Marly is here for BG review and insulin adjust. /91  Pulse 88  Ht 1.626 m (5' 4\")  Wt 98.6 kg (217 lb 4.8 oz)  SpO2 (!) 91%  BMI 37.3 kg/m2. She is accompanied by Nancy [facility]. Marly is taking Lantus 12 units daily and Glipizide 5 mg bid. Readings range as follows: fastin-157 and post-supper: 149-248, 275. Denies lows.    Lab Results   Component Value Date    A1C 6.9 2018    A1C 6.9 2017    A1C 7.3 07/10/2017    A1C 7.7 02/10/2017    A1C 6.9 02/10/2017     Marly's BG has trended upward since we stopped her Metformin in January.  5 of 14 post-supper readings were in target leading me to ask Marly about evening food choices. She discusses drinking regular pop with her evening meal and that her significant other does bring home chips/snacks. We discussed with Marly suggestions on how to approach her significant other in conversation regarding higher carb foods at home. Nancy encouraged Marly to call her to discuss BG and food concerns.    Overall, Marly is doing well. Will increase Lantus to decrease FBGs. Marly expresses concern about keeping her diabetes in control as she has family members who have kidney problems. Encouraged Marly that she has been doing well with this change to insulin.      Plan:     Marly will be calling in both FBG and post-supper readings to [facility] staff    Increase Lantus to 15 units daily at 5 pm.    I will call you on Monday, 3/26/18, late afternoon for BG readings.    Please return to Diabetes Ed in 3 months.          Time spent with patient 30 minutes.  "

## 2018-05-22 LAB — HBA1C MFR BLD: 7.7 % (ref 0–5.7)

## 2018-06-19 ENCOUNTER — HOSPITAL ENCOUNTER (OUTPATIENT)
Dept: EDUCATION SERVICES | Facility: HOSPITAL | Age: 53
Discharge: HOME | End: 2018-06-19
Attending: NURSE PRACTITIONER | Admitting: NURSE PRACTITIONER
Payer: MEDICARE

## 2018-06-19 VITALS
BODY MASS INDEX: 38.68 KG/M2 | HEART RATE: 87 BPM | WEIGHT: 226.6 LBS | SYSTOLIC BLOOD PRESSURE: 123 MMHG | DIASTOLIC BLOOD PRESSURE: 82 MMHG | OXYGEN SATURATION: 95 % | HEIGHT: 64 IN

## 2018-06-19 DIAGNOSIS — E11.65 TYPE 2 DIABETES MELLITUS WITH HYPERGLYCEMIA, WITH LONG-TERM CURRENT USE OF INSULIN (H): Primary | ICD-10-CM

## 2018-06-19 DIAGNOSIS — E11.65 TYPE 2 DIABETES MELLITUS WITH HYPERGLYCEMIA, WITHOUT LONG-TERM CURRENT USE OF INSULIN (H): ICD-10-CM

## 2018-06-19 DIAGNOSIS — Z79.4 TYPE 2 DIABETES MELLITUS WITH HYPERGLYCEMIA, WITH LONG-TERM CURRENT USE OF INSULIN (H): Primary | ICD-10-CM

## 2018-06-19 PROCEDURE — G0108 DIAB MANAGE TRN  PER INDIV: HCPCS

## 2018-06-19 ASSESSMENT — PAIN SCALES - GENERAL: PAINLEVEL: NO PAIN (0)

## 2018-06-19 NOTE — PROGRESS NOTES
"Marly is here for BG review and insulin adjust. /82  Pulse 87  Ht 1.626 m (5' 4\")  Wt 102.8 kg (226 lb 9.6 oz)  SpO2 95%  BMI 38.9 kg/m2 She is accompanied by Rush from [facility].  She is taking Lantus 14 units daily and Glipizide 10 mg bid. Readings range as follows: fastin, 175, 182-228 and post-supper: 177, 203-359. Glucose readings are much elevated since I last met with Marly. Denies lows.    Weigh gain of 9 lbs.    Lab Results   Component Value Date    A1C 7.7 2018    A1C 6.9 2018    A1C 6.9 2017    A1C 7.3 07/10/2017    A1C 7.7 02/10/2017    A1C 6.9 02/10/2017     Reviewed BG/A1C targets, actions of medications, injection site selection/rotation, benefits of exercise and weight loss.    Reviewed usual foods eaten. States eats twice a day. Food choices are appropriate.      Plan:  Increase Lantus to 20 units daily    Work on walking about 150 minutes per week - encourage walking program at [facility]    I will call for BG readings in 2 weeks and follow by phone.    Will follow at Diabetes Ed: 18.    Time spent with patient 30 minutes.  "

## 2018-06-19 NOTE — NURSING NOTE
"Chief Complaint   Patient presents with     Diabetes     Return       Initial /82  Pulse 87  Ht 1.626 m (5' 4\")  Wt 102.8 kg (226 lb 9.6 oz)  SpO2 95%  BMI 38.9 kg/m2 Estimated body mass index is 38.9 kg/(m^2) as calculated from the following:    Height as of this encounter: 1.626 m (5' 4\").    Weight as of this encounter: 102.8 kg (226 lb 9.6 oz).  Medication Reconciliation: annie Baez MA    "

## 2018-06-21 NOTE — DISCHARGE INSTRUCTIONS
Increase Lantus to 20 units daily    Work on walking about 150 minutes per week - encourage walking program at [facility]    I will call for BG readings in 2 weeks.    Will follow at Diabetes Ed: 8/23/18.

## 2018-06-28 ENCOUNTER — TRANSFERRED RECORDS (OUTPATIENT)
Dept: HEALTH INFORMATION MANAGEMENT | Facility: CLINIC | Age: 53
End: 2018-06-28

## 2018-08-21 ENCOUNTER — TRANSFERRED RECORDS (OUTPATIENT)
Dept: HEALTH INFORMATION MANAGEMENT | Facility: HOSPITAL | Age: 53
End: 2018-08-21

## 2018-08-21 LAB
ALT SERPL-CCNC: 9 IU/L (ref 6–31)
AST SERPL-CCNC: 10 IU/L (ref 10–40)
CREAT SERPL-MCNC: 1.36 MG/DL (ref 0.4–1)
GFR SERPL CREATININE-BSD FRML MDRD: 40 ML/MIN/1.73M2
GLUCOSE SERPL-MCNC: 228 MG/DL (ref 70–100)
HBA1C MFR BLD: 9.6 % (ref 0–?)
HBA1C MFR BLD: 9.6 % (ref 4–6)
POTASSIUM SERPL-SCNC: 4.1 MEQ/L (ref 3.4–5.1)

## 2018-08-29 ENCOUNTER — HOSPITAL ENCOUNTER (OUTPATIENT)
Dept: EDUCATION SERVICES | Facility: HOSPITAL | Age: 53
Discharge: HOME | End: 2018-08-29
Attending: NURSE PRACTITIONER | Admitting: NURSE PRACTITIONER
Payer: MEDICARE

## 2018-08-29 VITALS
SYSTOLIC BLOOD PRESSURE: 125 MMHG | WEIGHT: 229.5 LBS | BODY MASS INDEX: 39.18 KG/M2 | OXYGEN SATURATION: 91 % | HEIGHT: 64 IN | DIASTOLIC BLOOD PRESSURE: 90 MMHG | HEART RATE: 92 BPM

## 2018-08-29 DIAGNOSIS — Z79.4 TYPE 2 DIABETES MELLITUS WITH HYPERGLYCEMIA, WITH LONG-TERM CURRENT USE OF INSULIN (H): Primary | ICD-10-CM

## 2018-08-29 DIAGNOSIS — E11.65 TYPE 2 DIABETES MELLITUS WITH HYPERGLYCEMIA, WITH LONG-TERM CURRENT USE OF INSULIN (H): Primary | ICD-10-CM

## 2018-08-29 PROCEDURE — G0108 DIAB MANAGE TRN  PER INDIV: HCPCS

## 2018-08-29 ASSESSMENT — PAIN SCALES - GENERAL: PAINLEVEL: NO PAIN (0)

## 2018-08-29 NOTE — IP AVS SNAPSHOT
"                  MRN:[id number]                      After Visit Summary   8/29/2018    Marly Cannon    MRN: [id number]           Thank you!     Thank you for choosing [facility] for your care. Our goal is always to provide you with excellent care. Hearing back from our patients is one way we can continue to improve our services. Please take a few minutes to complete the written survey that you may receive in the mail after you visit with us. Thank you!        Patient Information     Date Of Birth          1965        About your hospital stay     You were admitted on:  August 29, 2018 You last received care in the:  [facility]    You were discharged on:  August 29, 2018       Who to Call     For medical emergencies, please call 911.  For non-urgent questions about your medical care, please call your primary care provider or clinic, [phone]          Attending Provider     Provider Specialty    Courtney Chaudhary NP [facility]       Primary Care Provider Office Phone # Fax #    Amberlymicki Whyte [phone]      Care Instructions    Increase Lantus to 25 units daily - start today    Please check BG every morning and at bedtime    I will call you on 9/4/18 and on 9/7/18 in the afternoon for BG readings    Return to Diabetes Ed on 9/19/18 at 1:30 pm          Pending Results     No orders found from 8/27/2018 to 8/30/2018.            Admission Information     Date & Time Provider Department Dept. Phone    8/29/2018 Courtney Chaudhary NP [facility] [phone]      Your Vitals Were     Blood Pressure Pulse Height Weight Pulse Oximetry BMI (Body Mass Index)    125/90 (BP Location: Left arm, Patient Position: Sitting, Cuff Size: Adult Large) 92 1.626 m (5' 4\") 104.1 kg (229 lb 8 oz) 91% 39.39 kg/m2      Care EveryWhere ID     This is your Care EveryWhere ID. This could be used by other organizations to access your [facility] medical records  [id number]        Equal " Access to Services     [facility]: Hadii aad ku haddicksonjorge Damon, waaxda luqadaha, qaybta kaalmakya martinez, chandra rico. So [facility] [phone].    ATENCIÓN: Si habla español, tiene a crook disposición servicios gratuitos de asistencia lingüística. Llame al [phone].    We comply with applicable federal civil rights laws and [place] laws. We do not discriminate on the basis of race, color, national origin, age, disability, sex, sexual orientation, or gender identity.               Review of your medicines      UNREVIEWED medicines. Ask your doctor about these medicines        Dose / Directions    AMLODIPINE BESYLATE PO        Dose:  2.5 mg   Take 2.5 mg by mouth daily   Refills:  0       ASPIRIN EC PO        Dose:  81 mg   Take 81 mg by mouth daily   Refills:  0       CLARITIN PO        Refills:  0       DEPO-PROVERA IM        Refills:  0       GLIPIZIDE PO        Dose:  10 mg   Take 10 mg by mouth 2 times daily (before meals)   Refills:  0       HYDROCHLOROTHIAZIDE PO        Dose:  25 mg   Take 25 mg by mouth daily   Refills:  0       insulin glargine 100 UNIT/ML injection   Commonly known as:  LANTUS SOLOSTAR   Used for:  Type 2 diabetes mellitus with hyperglycemia, without long-term current use of insulin (H)        Dose:  20 Units   Inject 20 Units Subcutaneous At Bedtime   Quantity:  15 mL   Refills:  3       LISINOPRIL PO        Dose:  40 mg   Take 40 mg by mouth daily   Refills:  0       PRIMIDONE PO        Dose:  50 mg   Take 50 mg by mouth At Bedtime   Refills:  0       SIMVASTATIN PO        Dose:  40 mg   Take 40 mg by mouth At Bedtime   Refills:  0       TYLENOL PO        Refills:  0         CONTINUE these medicines which have NOT CHANGED        Dose / Directions    insulin pen needle 32G X 4 MM   Used for:  Type 2 diabetes mellitus with hyperglycemia, without long-term current use of insulin (H)        Use 1 pen needles daily   Quantity:  100 each   Refills:  10                 Protect others around you: Learn how to safely use, store and throw away your medicines at www.disposemymeds.org.             Medication List: This is a list of all your medications and when to take them. Check marks below indicate your daily home schedule. Keep this list as a reference.      Medications           Morning Afternoon Evening Bedtime As Needed    AMLODIPINE BESYLATE PO   Take 2.5 mg by mouth daily                                ASPIRIN EC PO   Take 81 mg by mouth daily                                CLARITIN PO                                DEPO-PROVERA IM                                GLIPIZIDE PO   Take 10 mg by mouth 2 times daily (before meals)                                HYDROCHLOROTHIAZIDE PO   Take 25 mg by mouth daily                                insulin glargine 100 UNIT/ML injection   Commonly known as:  LANTUS SOLOSTAR   Inject 20 Units Subcutaneous At Bedtime                                insulin pen needle 32G X 4 MM   Use 1 pen needles daily                                LISINOPRIL PO   Take 40 mg by mouth daily                                PRIMIDONE PO   Take 50 mg by mouth At Bedtime                                SIMVASTATIN PO   Take 40 mg by mouth At Bedtime                                TYLENOL PO

## 2018-08-29 NOTE — PATIENT INSTRUCTIONS
Increase Lantus to 25 units daily - start today    Please check BG every morning and at bedtime    I will call you on 9/4/18 and on 9/7/18 in the afternoon for BG readings    Return to Diabetes Ed on 9/19/18 at 1:30 pm

## 2018-09-01 NOTE — PROGRESS NOTES
"Diabetes Self-Management Education & Support    Diabetes Education Self Management & Training    SUBJECTIVE/OBJECTIVE:  Presents for: Follow-up  Accompanied by: Self, Other (Nancy)  Diabetes education in the past 24mo: Yes  Focus of Visit: Monitoring, Taking Medication  Insulin Type: Lantus  Diabetes type: Type 2  Disease course: Stable  Diabetes management related comments/concerns: BG readings remain elevated  Transportation concerns: No  Other concerns:: Glasses, Cognitive impairment    Diabetes Symptoms & Complications  Blurred vision: No  Fatigue: No  Foot paresthesias: No  Foot ulcerations: No  Polydipsia: No  Polyphagia: No  Polyuria: No  Visual change: No  Weakness: No  Weight loss: No  Slow healing wounds: No  Symptom course: Stable  Weight trend: Increasing steadily  Autonomic neuropathy: No  CVA: No  Heart disease: No  Nephropathy: Yes  Peripheral neuropathy: No  Peripheral Vascular Disease: No  Retinopathy: No  Sexual dysfunction: No    Patient Problem List and Family Medical History reviewed for relevant medical history, current medical status, and diabetes risk factors.    Vitals:  /90 (BP Location: Left arm, Patient Position: Sitting, Cuff Size: Adult Large)  Pulse 92  Ht 1.626 m (5' 4\")  Wt 104.1 kg (229 lb 8 oz)  SpO2 (!) 91%  BMI 39.39 kg/m2  Estimated body mass index is 39.39 kg/(m^2) as calculated from the following:    Height as of this encounter: 1.626 m (5' 4\").    Weight as of this encounter: 104.1 kg (229 lb 8 oz).   Last 3 BP:   BP Readings from Last 3 Encounters:   08/29/18 125/90   06/19/18 123/82   03/19/18 135/91       History   Smoking Status     Current Every Day Smoker     Packs/day: 2.00     Years: 34.00     Types: Cigarettes   Smokeless Tobacco     Never Used       Labs:  Lab Results   Component Value Date    A1C 7.7 05/22/2018     Lab Results   Component Value Date     11/21/2017     Lab Results   Component Value Date     04/29/2016     HDL Cholesterol "   Date Value Ref Range Status   02/10/2017 44 40 - 60 mg/dL Final   ]  GFR Estimate   Date Value Ref Range Status   11/21/2017 39 >60 ml/min/1.73m2 Final     GFR Estimate If Black   Date Value Ref Range Status   12/03/2016 56 (L) >60 mL/min/1.7m2 Final     Comment:      GFR Calc     Lab Results   Component Value Date    CR 1.47 11/21/2017     No results found for: MICROALBUMIN    Healthy Eating  Cultural/Zoroastrianism diet restrictions?: No  Patient on a regular basis: Eats 3 meals a day  Meal planning: Smaller portions, Avoiding sweets  Meals include: Breakfast, Lunch, Dinner    Being Active  Exercise:: Currently not exercising  Barrier to exercise: Access    Monitoring  Did patient bring glucose meter to appointment? : Yes  High Glucose Range (mg/dL): >200    Taking Medications  Diabetes Medication(s)     Insulin Sig    insulin glargine (LANTUS SOLOSTAR) 100 UNIT/ML pen Inject 20 Units Subcutaneous At Bedtime    Sulfonylureas Sig    GLIPIZIDE PO Take 10 mg by mouth 2 times daily (before meals)          Current Treatments: Insulin Injections, Oral Agent (monotherapy)  Dose schedule: at bedtime  Given by: Patient  Injection/Infusion sites: Abdomen  Problems taking diabetes medications regularly?: Yes  Diabetes medication side effects?: No  Treatment Compliance: Most of the time    Problem Solving  Hypoglycemia Frequency: Rarely  Patient carries a carbohydrate source: No  Medical alert: No  Severe weather/disaster plan for diabetes management?: No  DKA prevention plan?: No    Hypoglycemia Symptoms  Confusion: No  Dizziness or Light-Headedness: No  Headaches: No  Hunger: Yes  Mood changes: No  Nervousness/Anxiety: No  Sleepiness: No  Speech difficulty: No  Sweats: Yes  Tremors: Yes    Hypoglycemia Complications  Blackouts: No  Hospitalization: No  Nocturnal hypoglycemia: No  Required assistance: No  Required glucagon injection: No  Seizures: No    Reducing Risks  Diabetes Risks: Age over 45 years,  Hyperlipidemia  CAD Risks: Diabetes Mellitus, Dyslipidemia, Hypertension, Tobacco exposure    Healthy Coping  Emotional response to diabetes: Acceptance  Informal Support system:: Significant other  Difficulty affording diabetes management supplies?: No  Support resources: None  Patient Activation Measure Survey Score:  SHADI Score (Last Two) 2018   SHADI Raw Score 28   Activation Score 50   SHADI Level 2       ASSESSMENT:  Readings range as follows: fastin-306, pre-supper: 308, 432 and post-supper: 206. Marly has not been testing often post supper. She understands that readings are elevated.        Patient's most recent   Lab Results   Component Value Date    A1C 7.7 2018    is not meeting goal of <7.0    INTERVENTION:   Diabetes knowledge and skills assessment:     Patient is knowledgeable in diabetes management concepts related to: Monitoring and Taking Medication    Patient needs further education on the following diabetes management concepts: Healthy Eating, Monitoring and Taking Medication    Based on learning assessment above, most appropriate setting for further diabetes education would be: Individual setting.    Education provided today on:  AADE Self-Care Behaviors:  Monitoring: log and interpret results, individual blood glucose targets and frequency of monitoring  Taking Medication: drawing up, administering and storing injectable diabetes medications and proper site selection and rotation for injections  Problem Solving: low blood glucose - causes, signs/symptoms, treatment and prevention and carrying a carbohydrate source at all times    Opportunities for ongoing education and support in diabetes-self management were discussed.    Pt verbalized understanding of concepts discussed and recommendations provided today.       Education Materials Provided:  No new materials provided today    PLAN:  See Patient Instructions for co-developed, patient-stated behavior change goals.  Increase Lantus  to 25 units daily - start today    Please check BG every morning and at bedtime    I will call you on 9/4/18 and on 9/7/18 in the afternoon for BG readings    Return to Diabetes Ed on 9/19/18 at 1:30 pm   AVS printed and provided to patient today. See Follow-Up section for recommended follow-up.      Time Spent: 30 minutes  Encounter Type: Individual    Any diabetes medication dose changes were made via the CDE Protocol and Collaborative Practice Agreement with the patient's primary care provider. A copy of this encounter was shared with the provider.

## 2018-09-06 ENCOUNTER — PATIENT OUTREACH (OUTPATIENT)
Dept: EDUCATION SERVICES | Facility: HOSPITAL | Age: 53
End: 2018-09-06

## 2018-09-06 NOTE — PROGRESS NOTES
Called patient for BG review.  Marly is taking Lantus 25 units daily. Readings range as follows: fastin-201 and post-supper: 143-151.    Plan Increase Lantus to 28 units daily and call for BG readings 9/10/18.    Notified Nancy of Lantus increase.    Will follow at Diabetes Ed 18

## 2018-09-19 ENCOUNTER — HOSPITAL ENCOUNTER (OUTPATIENT)
Dept: EDUCATION SERVICES | Facility: HOSPITAL | Age: 53
End: 2018-09-19
Attending: NURSE PRACTITIONER
Payer: MEDICARE

## 2018-09-19 ENCOUNTER — ANESTHESIA EVENT (OUTPATIENT)
Dept: SURGERY | Facility: HOSPITAL | Age: 53
End: 2018-09-19
Payer: MEDICARE

## 2018-09-19 VITALS
HEIGHT: 66 IN | OXYGEN SATURATION: 93 % | WEIGHT: 229.2 LBS | DIASTOLIC BLOOD PRESSURE: 84 MMHG | HEART RATE: 96 BPM | SYSTOLIC BLOOD PRESSURE: 137 MMHG | BODY MASS INDEX: 36.83 KG/M2

## 2018-09-19 DIAGNOSIS — E11.65 TYPE 2 DIABETES MELLITUS WITH HYPERGLYCEMIA, WITH LONG-TERM CURRENT USE OF INSULIN (H): Primary | ICD-10-CM

## 2018-09-19 DIAGNOSIS — Z79.4 TYPE 2 DIABETES MELLITUS WITH HYPERGLYCEMIA, WITH LONG-TERM CURRENT USE OF INSULIN (H): Primary | ICD-10-CM

## 2018-09-19 PROCEDURE — 99207 ZZC NO CHARGE DRC TIME CRITERIA NOT MET: CPT

## 2018-09-19 RX ORDER — HYDROCORTISONE 2.5 %
CREAM (GRAM) TOPICAL 2 TIMES DAILY
COMMUNITY
End: 2023-03-10

## 2018-09-19 RX ORDER — AMMONIUM LACTATE 12 G/100G
CREAM TOPICAL 2 TIMES DAILY
COMMUNITY
End: 2023-03-10

## 2018-09-19 RX ORDER — ALBUTEROL SULFATE 90 UG/1
2 AEROSOL, METERED RESPIRATORY (INHALATION) EVERY 6 HOURS
Status: ON HOLD | COMMUNITY
End: 2018-09-21

## 2018-09-19 RX ORDER — TRIAMCINOLONE ACETONIDE 1 MG/G
CREAM TOPICAL 2 TIMES DAILY
COMMUNITY
End: 2022-01-19

## 2018-09-19 ASSESSMENT — COPD QUESTIONNAIRES: COPD: 1

## 2018-09-19 ASSESSMENT — LIFESTYLE VARIABLES: TOBACCO_USE: 1

## 2018-09-19 ASSESSMENT — PAIN SCALES - GENERAL: PAINLEVEL: NO PAIN (0)

## 2018-09-19 NOTE — ANESTHESIA PREPROCEDURE EVALUATION
Anesthesia Evaluation     . Pt has had prior anesthetic.            ROS/MED HX    ENT/Pulmonary:     (+)ADEEL risk factors (BMI: 37.71) obese, allergic rhinitis, tobacco use, Current use 1.0 PPD packs/day  COPD, , . Other pulmonary disease history of PE.    Neurologic:     (+)migraines, other neuro tremors, intellectual disability    Cardiovascular:     (+) Dyslipidemia, hypertension----. : . . . :. .       METS/Exercise Tolerance:     Hematologic:  - neg hematologic  ROS       Musculoskeletal:   (+) arthritis, , , -       GI/Hepatic:     (+) bowel prep,       Renal/Genitourinary:     (+) chronic renal disease, type: ARF and CRI,       Endo:     (+) type II DM Last HgA1c: 7.7 date: 5/22/18 Using insulin Obesity, Other Endocrine Disorder hyperparathyroidism.      Psychiatric:         Infectious Disease:  - neg infectious disease ROS       Malignancy:      - no malignancy   Other:    (+) other significant disability Developmental delay                   Physical Exam      Airway   Mallampati: III  TM distance: >3 FB  Neck ROM: full    Dental   (+) lower dentures, upper dentures and missing    Cardiovascular   Rhythm and rate: regular and normal      Pulmonary (+) wheezes                       Anesthesia Plan      History & Physical Review  History and physical reviewed and following examination; no interval change.    ASA Status:  4 .    NPO Status:  > 8 hours    Plan for MAC with Intravenous and Propofol induction. Maintenance will be TIVA.  Reason for MAC:  Chronic cardiopulmonary disease (G9) and Other - see comments  PONV prophylaxis:  Ondansetron (or other 5HT-3)  H and P date 9/18/18  Surgeon requests deep sedation. Patient is an ASA 4. Will provide MAC.      Postoperative Care  Postoperative pain management:  IV analgesics.      Consents  Anesthetic plan, risks, benefits and alternatives discussed with:  Patient..                          .

## 2018-09-19 NOTE — LETTER
"    2018        RE: Marly CARCAMO Cannon   [address]  [place] [address]        Marly is here for BG review. /84 (BP Location: Left arm, Patient Position: Sitting, Cuff Size: Adult Large)  Pulse 96  Ht 1.676 m (5' 6\")  Wt 104 kg (229 lb 3.2 oz)  SpO2 93%  BMI 36.99 kg/m2 She is taking Lantus 27 units daily (did instruct her to inject 28 units daily) and Glipizide 10 units bid.    Readings range as follows: fastin-244, 377 and post-supper: 326.      Will increase Lantus to 28 units daily.  Instructed Marly to check BG both AM and at 7 pm  Follow-up visit on 10/17/18. If not checking PM BGs will recommend CGM    Time spent with patient 15 minutes.      Sincerely,        Kat Dawn RN    "

## 2018-09-20 NOTE — OR NURSING
Client phoned in with question if she could drink black coffee today; reviewed clear liquids with her; she repeated back; understands her prep; may drink clear liquids up until 2 hours prior to her arrival time and we will call at 1330.

## 2018-09-20 NOTE — PROGRESS NOTES
"Marly is here for BG review. /84 (BP Location: Left arm, Patient Position: Sitting, Cuff Size: Adult Large)  Pulse 96  Ht 1.676 m (5' 6\")  Wt 104 kg (229 lb 3.2 oz)  SpO2 93%  BMI 36.99 kg/m2 She is taking Lantus 27 units daily (did instruct her to inject 28 units daily) and Glipizide 10 units bid.    Readings range as follows: fastin-244, 377 and post-supper: 326.      Will increase Lantus to 28 units daily.  Instructed Marly to check BG both AM and at 7 pm  Follow-up visit on 10/17/18. If not checking PM BGs will recommend CGM    Time spent with patient 15 minutes.  "

## 2018-09-21 ENCOUNTER — APPOINTMENT (OUTPATIENT)
Dept: LAB | Facility: HOSPITAL | Age: 53
End: 2018-09-21
Attending: FAMILY MEDICINE
Payer: MEDICARE

## 2018-09-21 ENCOUNTER — SURGERY (OUTPATIENT)
Age: 53
End: 2018-09-21

## 2018-09-21 ENCOUNTER — ANESTHESIA (OUTPATIENT)
Dept: SURGERY | Facility: HOSPITAL | Age: 53
End: 2018-09-21
Payer: MEDICARE

## 2018-09-21 ENCOUNTER — HOSPITAL ENCOUNTER (OUTPATIENT)
Facility: HOSPITAL | Age: 53
Discharge: HOME | End: 2018-09-21
Attending: FAMILY MEDICINE | Admitting: FAMILY MEDICINE
Payer: MEDICARE

## 2018-09-21 VITALS
DIASTOLIC BLOOD PRESSURE: 102 MMHG | RESPIRATION RATE: 20 BRPM | SYSTOLIC BLOOD PRESSURE: 114 MMHG | OXYGEN SATURATION: 92 % | WEIGHT: 224 LBS | HEIGHT: 64 IN | TEMPERATURE: 98.1 F | BODY MASS INDEX: 38.24 KG/M2

## 2018-09-21 LAB
ANION GAP SERPL CALCULATED.3IONS-SCNC: 7 MMOL/L (ref 3–14)
BUN SERPL-MCNC: 23 MG/DL (ref 7–30)
CALCIUM SERPL-MCNC: 9.4 MG/DL (ref 8.5–10.1)
CHLORIDE SERPL-SCNC: 100 MMOL/L (ref 94–109)
CO2 SERPL-SCNC: 27 MMOL/L (ref 20–32)
CREAT SERPL-MCNC: 1.25 MG/DL (ref 0.52–1.04)
ERYTHROCYTE [DISTWIDTH] IN BLOOD BY AUTOMATED COUNT: 13.2 % (ref 10–15)
GFR SERPL CREATININE-BSD FRML MDRD: 44 ML/MIN/1.7M2
GLUCOSE BLDC GLUCOMTR-MCNC: 233 MG/DL (ref 70–99)
GLUCOSE SERPL-MCNC: 236 MG/DL (ref 70–99)
HCT VFR BLD AUTO: 52.6 % (ref 35–47)
HGB BLD-MCNC: 17.9 G/DL (ref 11.7–15.7)
MCH RBC QN AUTO: 31.2 PG (ref 26.5–33)
MCHC RBC AUTO-ENTMCNC: 34 G/DL (ref 31.5–36.5)
MCV RBC AUTO: 92 FL (ref 78–100)
PLATELET # BLD AUTO: 274 10E9/L (ref 150–450)
POTASSIUM SERPL-SCNC: 3.9 MMOL/L (ref 3.4–5.3)
RBC # BLD AUTO: 5.74 10E12/L (ref 3.8–5.2)
SODIUM SERPL-SCNC: 134 MMOL/L (ref 133–144)
WBC # BLD AUTO: 8.9 10E9/L (ref 4–11)

## 2018-09-21 PROCEDURE — 25000128 H RX IP 250 OP 636: Performed by: NURSE ANESTHETIST, CERTIFIED REGISTERED

## 2018-09-21 PROCEDURE — 36000050 ZZH SURGERY LEVEL 2 1ST 30 MIN: Performed by: FAMILY MEDICINE

## 2018-09-21 PROCEDURE — 36000052 ZZH SURGERY LEVEL 2 EA 15 ADDTL MIN: Performed by: FAMILY MEDICINE

## 2018-09-21 PROCEDURE — 82962 GLUCOSE BLOOD TEST: CPT

## 2018-09-21 PROCEDURE — 40000305 ZZH STATISTIC PRE PROC ASSESS I: Performed by: FAMILY MEDICINE

## 2018-09-21 PROCEDURE — 45385 COLONOSCOPY W/LESION REMOVAL: CPT | Performed by: ANESTHESIOLOGY

## 2018-09-21 PROCEDURE — 37000009 ZZH ANESTHESIA TECHNICAL FEE, EACH ADDTL 15 MIN: Performed by: FAMILY MEDICINE

## 2018-09-21 PROCEDURE — 71000027 ZZH RECOVERY PHASE 2 EACH 15 MINS: Performed by: FAMILY MEDICINE

## 2018-09-21 PROCEDURE — 85027 COMPLETE CBC AUTOMATED: CPT | Performed by: ANESTHESIOLOGY

## 2018-09-21 PROCEDURE — 36415 COLL VENOUS BLD VENIPUNCTURE: CPT | Performed by: ANESTHESIOLOGY

## 2018-09-21 PROCEDURE — 45385 COLONOSCOPY W/LESION REMOVAL: CPT | Performed by: NURSE ANESTHETIST, CERTIFIED REGISTERED

## 2018-09-21 PROCEDURE — 88305 TISSUE EXAM BY PATHOLOGIST: CPT | Mod: TC | Performed by: FAMILY MEDICINE

## 2018-09-21 PROCEDURE — 80048 BASIC METABOLIC PNL TOTAL CA: CPT | Performed by: ANESTHESIOLOGY

## 2018-09-21 PROCEDURE — 37000008 ZZH ANESTHESIA TECHNICAL FEE, 1ST 30 MIN: Performed by: FAMILY MEDICINE

## 2018-09-21 PROCEDURE — 27210794 ZZH OR GENERAL SUPPLY STERILE: Performed by: FAMILY MEDICINE

## 2018-09-21 RX ORDER — NALOXONE HYDROCHLORIDE 0.4 MG/ML
.1-.4 INJECTION, SOLUTION INTRAMUSCULAR; INTRAVENOUS; SUBCUTANEOUS
Status: DISCONTINUED | OUTPATIENT
Start: 2018-09-21 | End: 2018-09-21 | Stop reason: HOSPADM

## 2018-09-21 RX ORDER — LIDOCAINE 40 MG/G
CREAM TOPICAL
Status: DISCONTINUED | OUTPATIENT
Start: 2018-09-21 | End: 2018-09-21 | Stop reason: HOSPADM

## 2018-09-21 RX ORDER — SODIUM CHLORIDE, SODIUM LACTATE, POTASSIUM CHLORIDE, CALCIUM CHLORIDE 600; 310; 30; 20 MG/100ML; MG/100ML; MG/100ML; MG/100ML
INJECTION, SOLUTION INTRAVENOUS CONTINUOUS
Status: DISCONTINUED | OUTPATIENT
Start: 2018-09-21 | End: 2018-09-21 | Stop reason: HOSPADM

## 2018-09-21 RX ORDER — FLUMAZENIL 0.1 MG/ML
0.2 INJECTION, SOLUTION INTRAVENOUS
Status: DISCONTINUED | OUTPATIENT
Start: 2018-09-21 | End: 2018-09-21 | Stop reason: HOSPADM

## 2018-09-21 RX ORDER — PROPOFOL 10 MG/ML
INJECTION, EMULSION INTRAVENOUS PRN
Status: DISCONTINUED | OUTPATIENT
Start: 2018-09-21 | End: 2018-09-21

## 2018-09-21 RX ORDER — ONDANSETRON 2 MG/ML
4 INJECTION INTRAMUSCULAR; INTRAVENOUS EVERY 30 MIN PRN
Status: DISCONTINUED | OUTPATIENT
Start: 2018-09-21 | End: 2018-09-21 | Stop reason: HOSPADM

## 2018-09-21 RX ORDER — MEPERIDINE HYDROCHLORIDE 50 MG/ML
12.5 INJECTION INTRAMUSCULAR; INTRAVENOUS; SUBCUTANEOUS
Status: DISCONTINUED | OUTPATIENT
Start: 2018-09-21 | End: 2018-09-21 | Stop reason: HOSPADM

## 2018-09-21 RX ORDER — ONDANSETRON 4 MG/1
4 TABLET, ORALLY DISINTEGRATING ORAL EVERY 30 MIN PRN
Status: DISCONTINUED | OUTPATIENT
Start: 2018-09-21 | End: 2018-09-21 | Stop reason: HOSPADM

## 2018-09-21 RX ADMIN — PROPOFOL 50 MG: 10 INJECTION, EMULSION INTRAVENOUS at 12:09

## 2018-09-21 RX ADMIN — PROPOFOL 30 MG: 10 INJECTION, EMULSION INTRAVENOUS at 11:59

## 2018-09-21 RX ADMIN — PROPOFOL 30 MG: 10 INJECTION, EMULSION INTRAVENOUS at 11:55

## 2018-09-21 RX ADMIN — PROPOFOL 30 MG: 10 INJECTION, EMULSION INTRAVENOUS at 11:58

## 2018-09-21 RX ADMIN — SODIUM CHLORIDE, POTASSIUM CHLORIDE, SODIUM LACTATE AND CALCIUM CHLORIDE: 600; 310; 30; 20 INJECTION, SOLUTION INTRAVENOUS at 09:55

## 2018-09-21 RX ADMIN — PROPOFOL 50 MG: 10 INJECTION, EMULSION INTRAVENOUS at 12:05

## 2018-09-21 RX ADMIN — PROPOFOL 50 MG: 10 INJECTION, EMULSION INTRAVENOUS at 12:14

## 2018-09-21 RX ADMIN — PROPOFOL 50 MG: 10 INJECTION, EMULSION INTRAVENOUS at 12:18

## 2018-09-21 RX ADMIN — PROPOFOL 10 MG: 10 INJECTION, EMULSION INTRAVENOUS at 12:03

## 2018-09-21 NOTE — INTERVAL H&P NOTE
ROS: 10 point ROS neg other than the symptoms noted above in the HPI.    This H&P has been reviewed and there are no clinically significant changes in the patient s condition.  The Patient is approved for surgery.

## 2018-09-21 NOTE — OP NOTE
[facility] Colonoscopy Operative Note     PROCEDURES:  Colonoscopy    PREOPERATIVE DIAGNOSIS:    1.  Colorectal cancer screening   2.  History of adenomatous polyps     POSTOPERATIVE DIAGNOSIS:    1.  Colorectal cancer screening   2.  History of adenomatous polyps   3.    ID Type Source Tests Collected by Time Destination   A : Polyp 75cm Colon Polyp Colon SURGICAL PATHOLOGY EXAM Gentry Porter MD 9/21/2018 12:12 PM    B : Polyp 55cm Colon Polyp Colon SURGICAL PATHOLOGY EXAM Gentry Porter MD 9/21/2018 12:15 PM    4.  Grade 1 internal hemorrhoids and external hemorrhoidal skin tags      ANESTHESIA:  MAC  ESTIMATED BLOOD LOSS: None  FLUIDS: See anesthesia record  COMPLICATIONS: None     DESCRIPTION OF PROCEDURE:  Marly Cannon is a 53 year old female who presents for screening colonoscopy.  She denies changes to her bowel habits including melena, hematochezia, nausea, emesis, abdominal pain or unexplained weight loss.   She denies FHX of colon cancer.    On the day prior to the procedure the patient underwent Suprep with satisfactory evacuation.  On the day of the procedure the patient was brought back to the procedure room where she was placed in the left lateral recumbent position.  IV monitored anesthesia was initiated with Local with MAC.  Before beginning colonoscopy a rectal exam was performed and was notable for external hemorrhoidal skin tags.      Following rectal exam colonoscope was introduced into the rectum and advanced toward the ileocecal junction with intermittent insufflation.  The cecum was reached and well visualized.  At this point the colonoscope was withdrawn ensuring adequate visualization of the lumen and bowel wall.  Findings were unremarkable throughout the ascending colon.  At 75 cm (transverse colon)  a less than 1 cm polyp(s) was identified and removed with hot forceps and site(s) was fulgurated.  The specimen(s) was retrieved and hemostasis was satisfactory.  The  colonoscope was further withdrawn at this point and at 55 cm (transverse colon) a less than 1 cm polyp(s) was identified and removed with hot forceps and site(s) was fulgurated.  The specimen(s) was retrieved and hemostasis was satisfactory.  The colonoscope was further withdrawn at this point and findings were unremarkable throughout the descending and sigmoid colon.  Upon reaching the rectum the colonoscope was retroflexed and was notable for Grade 1 internal hemorrhoids.  At this point the colonoscopy was straightened and withdrawn and the procedure was complete.      Overall the patient tolerated the procedure without difficulty.    I recommend that the patient repeat colonoscopy in 5 years.  My gratitude to Amberly Whyte for allowing my participation in the care of your patient.    Gentry Porter MD

## 2018-09-21 NOTE — IP AVS SNAPSHOT
MRN:[id number]                      After Visit Summary   9/21/2018    Marly Cannon    MRN: [id number]           Thank you!     Thank you for choosing [facility] for your care. Our goal is always to provide you with excellent care. Hearing back from our patients is one way we can continue to improve our services. Please take a few minutes to complete the written survey that you may receive in the mail after you visit with us. Thank you!        Patient Information     Date Of Birth          1965        About your hospital stay     You were admitted on:  September 21, 2018 You last received care in the:  [facility] Preop/Phase II    You were discharged on:  September 21, 2018       Who to Call     For medical emergencies, please call 911.  For non-urgent questions about your medical care, please call your primary care provider or clinic, [phone]  For questions related to your surgery, please call your surgery clinic        Attending Provider     Provider Specialty    Gentry Porter MD Family Practice       Primary Care Provider Office Phone # Fax #    Amberly ELISE Whyte [phone]      After Care Instructions     Discharge Instructions       Resume pre procedure diet            Discharge Instructions       Restart home medications.                  Your next 10 appointments already scheduled     Oct 17, 2018  1:00 PM CDT   (Arrive by 12:45 PM)   Diabetes Education with Kat Dawn RN   [facility] Diabetes Education ([facility] )    [address] [place] [id number] [phone]              Further instructions from your care team           INSTRUCTIONS AFTER COLONOSCOPY    WHEN YOU ARE BACK HOME:    Plan to rest for an hour or two after you get home.    You may have some cramping or pressure until you pass gas.    You may resume your regular medications.    Eat a small, light meal at first, and then gradually return to normal meal sizes.  If you had a polyp  removed:    Slight bleeding may occur.  You may have a slight blood stain on the toilet paper after a bowel movement.    To lessen the chance of bleeding, avoid heavy exercise for ONE WEEK.  This includes heavy lifting, vigorous sport activities, and heavy physical labor.  You may resume your normal sexual activity.      Avoid aspirin or aspirin products if instructed by your doctor.    WHAT TO WATCH FOR:  Problems rarely occur after the exam; however, it is important for you to watch for early signs of possible problems.  If you have     Unusual pain in your abdomen    Nausea and vomiting that persists    Excessive bleeding    Black or bloody bowel movements    Fever or temperature above 100.6 F  Please call your doctor ([facility] [phone]) or go to the nearest hospital emergency room.    Post-Anesthesia Patient Instructions    IMMEDIATELY FOLLOWING SURGERY:  Do not drive or operate machinery for the first twenty four hours after surgery.  Do not make any important decisions for twenty four hours after surgery or while taking narcotic pain medications or sedatives.  If you develop intractable nausea and vomiting or a severe headache please notify your doctor immediately.    FOLLOW-UP:  Please make an appointment with your surgeon as instructed. You do not need to follow up with anesthesia unless specifically instructed to do so.    WOUND CARE INSTRUCTIONS (if applicable):  Keep a dry clean dressing on the anesthesia/puncture wound site if there is drainage.  Once the wound has quit draining you may leave it open to air.  Generally you should leave the bandage intact for twenty four hours unless there is drainage.  If the epidural site drains for more than 36-48 hours please call the anesthesia department.    QUESTIONS?:  Please feel free to call your physician or the hospital  if you have any questions, and they will be happy to assist you.       Pending Results     No orders found from 9/19/2018 to  "9/22/2018.            Admission Information     Date & Time Provider Department Dept. Phone    9/21/2018 Gentry Porter MD [facility] Preop/Phase [phone]      Your Vitals Were     Blood Pressure Temperature Respirations Height Weight Pulse Oximetry    144/69 98.1  F (36.7  C) (Oral) 20 1.626 m (5' 4\") 101.6 kg (224 lb) 92%    BMI (Body Mass Index)                   38.45 kg/m2           Care EveryWhere ID     This is your Care EveryWhere ID. This could be used by other organizations to access your [facility] medical records  [id number]        Equal Access to Services     [facility]: Hadii melody Damon, wajoaquín baptiste, sandy kaalmakya martinez, chandra caceres . So [facility] [phone].    ATENCIÓN: Si habla español, tiene a crook disposición servicios gratuitos de asistencia lingüística. [facility] [phone].    We comply with applicable federal civil rights laws and [place] laws. We do not discriminate on the basis of race, color, national origin, age, disability, sex, sexual orientation, or gender identity.               Review of your medicines      CONTINUE these medicines which may have CHANGED, or have new prescriptions. If we are uncertain of the size of tablets/capsules you have at home, strength may be listed as something that might have changed.        Dose / Directions    insulin glargine 100 UNIT/ML injection   Commonly known as:  LANHEBERUS SOLOSTAR   This may have changed:  how much to take   Used for:  Type 2 diabetes mellitus with hyperglycemia, without long-term current use of insulin (H)        Dose:  20 Units   Inject 20 Units Subcutaneous At Bedtime   Quantity:  15 mL   Refills:  3         CONTINUE these medicines which have NOT CHANGED        Dose / Directions    AMLODIPINE BESYLATE PO        Dose:  2.5 mg   Take 2.5 mg by mouth daily   Refills:  0       ASPIRIN EC PO        Dose:  81 mg   Take 81 mg by mouth daily   Refills:  0       COMBIVENT RESPIMAT "  MCG/ACT inhaler   Generic drug:  Ipratropium-Albuterol        Dose:  1 puff   Inhale 1 puff into the lungs 4 times daily   Refills:  0       DEPO-PROVERA IM        Refills:  0       FLEXERIL PO        Dose:  10 mg   Take 10 mg by mouth 3 times daily as needed for muscle spasms   Refills:  0       GLIPIZIDE PO        Dose:  10 mg   Take 10 mg by mouth 2 times daily (before meals)   Refills:  0       HYDROCHLOROTHIAZIDE PO        Dose:  12.5 mg   Take 12.5 mg by mouth daily   Refills:  0       hydrocortisone 2.5 % cream        Apply topically 2 times daily   Refills:  0       insulin pen needle 32G X 4 MM   Used for:  Type 2 diabetes mellitus with hyperglycemia, without long-term current use of insulin (H)        Use 1 pen needles daily   Quantity:  100 each   Refills:  10       LAC-HYDRIN 12 % cream   Generic drug:  ammonium lactate        Apply topically 2 times daily   Refills:  0       LISINOPRIL PO        Dose:  40 mg   Take 40 mg by mouth daily   Refills:  0       PRIMIDONE PO        Dose:  50 mg   Take 50 mg by mouth At Bedtime   Refills:  0       SIMVASTATIN PO        Dose:  20 mg   Take 20 mg by mouth At Bedtime   Refills:  0       triamcinolone 0.1 % cream   Commonly known as:  KENALOG        Apply topically 2 times daily   Refills:  0       TYLENOL PO        Refills:  0       VITAMIN D-3 PO        Dose:  2000 Units   Take 2,000 Units by mouth daily   Refills:  0                Protect others around you: Learn how to safely use, store and throw away your medicines at www.disposemymeds.org.             Medication List: This is a list of all your medications and when to take them. Check marks below indicate your daily home schedule. Keep this list as a reference.      Medications           Morning Afternoon Evening Bedtime As Needed    AMLODIPINE BESYLATE PO   Take 2.5 mg by mouth daily                                ASPIRIN EC PO   Take 81 mg by mouth daily                                COMBIVENT  RESPIMAT  MCG/ACT inhaler   Inhale 1 puff into the lungs 4 times daily   Generic drug:  Ipratropium-Albuterol                                DEPO-PROVERA IM                                FLEXERIL PO   Take 10 mg by mouth 3 times daily as needed for muscle spasms                                GLIPIZIDE PO   Take 10 mg by mouth 2 times daily (before meals)                                HYDROCHLOROTHIAZIDE PO   Take 12.5 mg by mouth daily                                hydrocortisone 2.5 % cream   Apply topically 2 times daily                                insulin glargine 100 UNIT/ML injection   Commonly known as:  LANTUS SOLOSTAR   Inject 20 Units Subcutaneous At Bedtime                                insulin pen needle 32G X 4 MM   Use 1 pen needles daily                                LAC-HYDRIN 12 % cream   Apply topically 2 times daily   Generic drug:  ammonium lactate                                LISINOPRIL PO   Take 40 mg by mouth daily                                PRIMIDONE PO   Take 50 mg by mouth At Bedtime                                SIMVASTATIN PO   Take 20 mg by mouth At Bedtime                                triamcinolone 0.1 % cream   Commonly known as:  KENALOG   Apply topically 2 times daily                                TYLENOL PO                                VITAMIN D-3 PO   Take 2,000 Units by mouth daily

## 2018-09-21 NOTE — IP AVS SNAPSHOT
[facility] Preop/Phase II    [address] [address]    Phone:  [phone]                                       After Visit Summary   9/21/2018    Marly Cannon    MRN: [id number]           After Visit Summary Signature Page     I have received my discharge instructions, and my questions have been answered. I have discussed any challenges I see with this plan with the nurse or doctor.    ..........................................................................................................................................  Patient/Patient Representative Signature      ..........................................................................................................................................  Patient Representative Print Name and Relationship to Patient    ..................................................               ................................................  Date                                   Time    ..........................................................................................................................................  Reviewed by Signature/Title    ...................................................              ..............................................  Date                                               Time          22EPIC Rev 08/18

## 2018-09-21 NOTE — DISCHARGE INSTRUCTIONS
INSTRUCTIONS AFTER COLONOSCOPY    WHEN YOU ARE BACK HOME:    Plan to rest for an hour or two after you get home.    You may have some cramping or pressure until you pass gas.    You may resume your regular medications.    Eat a small, light meal at first, and then gradually return to normal meal sizes.  If you had a polyp removed:    Slight bleeding may occur.  You may have a slight blood stain on the toilet paper after a bowel movement.    To lessen the chance of bleeding, avoid heavy exercise for ONE WEEK.  This includes heavy lifting, vigorous sport activities, and heavy physical labor.  You may resume your normal sexual activity.      Avoid aspirin or aspirin products if instructed by your doctor.    WHAT TO WATCH FOR:  Problems rarely occur after the exam; however, it is important for you to watch for early signs of possible problems.  If you have     Unusual pain in your abdomen    Nausea and vomiting that persists    Excessive bleeding    Black or bloody bowel movements    Fever or temperature above 100.6 F  Please call your doctor ([facility] [phone]) or go to the nearest hospital emergency room.    Post-Anesthesia Patient Instructions    IMMEDIATELY FOLLOWING SURGERY:  Do not drive or operate machinery for the first twenty four hours after surgery.  Do not make any important decisions for twenty four hours after surgery or while taking narcotic pain medications or sedatives.  If you develop intractable nausea and vomiting or a severe headache please notify your doctor immediately.    FOLLOW-UP:  Please make an appointment with your surgeon as instructed. You do not need to follow up with anesthesia unless specifically instructed to do so.    WOUND CARE INSTRUCTIONS (if applicable):  Keep a dry clean dressing on the anesthesia/puncture wound site if there is drainage.  Once the wound has quit draining you may leave it open to air.  Generally you should leave the bandage intact for twenty four  hours unless there is drainage.  If the epidural site drains for more than 36-48 hours please call the anesthesia department.    QUESTIONS?:  Please feel free to call your physician or the hospital  if you have any questions, and they will be happy to assist you.

## 2018-09-21 NOTE — OR NURSING
Pateint discharged to home .  Shira score 20. Pain level 0/10.  Discharged from unit via walking

## 2018-09-21 NOTE — ANESTHESIA POSTPROCEDURE EVALUATION
Patient: Marly Cannon    Procedure(s):  COLONOSCOPY WITH POLYPECTOMY - Wound Class: II-Clean Contaminated    Diagnosis:SCREENING FOR COLO RECTAL CANCER  Diagnosis Additional Information: No value filed.    Anesthesia Type:  MAC    Note:  Anesthesia Post Evaluation    Patient location during evaluation: Phase 2  Patient participation: Able to fully participate in evaluation  Level of consciousness: awake and alert  Pain management: adequate  Airway patency: patent  Cardiovascular status: acceptable  Respiratory status: acceptable  Hydration status: acceptable  PONV: none             Last vitals:  Vitals:    09/21/18 1245 09/21/18 1250 09/21/18 1259   BP: 129/95 (!) 114/102    Resp:      Temp:      SpO2: 97% 92% 92%         Electronically Signed By: BLAIR Estrada CRNA  September 21, 2018  1:30 PM

## 2018-09-21 NOTE — ANESTHESIA CARE TRANSFER NOTE
Patient: Marly Cannon    Procedure(s):  COLONOSCOPY WITH POLYPECTOMY - Wound Class: II-Clean Contaminated    Diagnosis: SCREENING FOR COLO RECTAL CANCER  Diagnosis Additional Information: No value filed.    Anesthesia Type:   MAC     Note:  Airway :Nasal Cannula  Patient transferred to:Phase II  Handoff Report: Identifed the Patient, Identified the Reponsible Provider, Reviewed the pertinent medical history, Discussed the surgical course, Reviewed Intra-OP anesthesia mangement and issues during anesthesia, Set expectations for post-procedure period and Allowed opportunity for questions and acknowledgement of understanding      Vitals: (Last set prior to Anesthesia Care Transfer)    CRNA VITALS  9/21/2018 1156 - 9/21/2018 1229      9/21/2018             Resp Rate (set): 8                Electronically Signed By: BLAIR Estrada CRNA  September 21, 2018  12:29 PM

## 2018-09-24 LAB — COPATH REPORT: NORMAL

## 2018-09-26 DIAGNOSIS — E11.9 DIABETES MELLITUS, TYPE 2 (H): Primary | ICD-10-CM

## 2018-10-16 PROBLEM — F79 INTELLECTUAL DISABILITY: Status: ACTIVE | Noted: 2017-08-01

## 2018-10-17 ENCOUNTER — HOSPITAL ENCOUNTER (OUTPATIENT)
Dept: EDUCATION SERVICES | Facility: HOSPITAL | Age: 53
Discharge: HOME | End: 2018-10-17
Attending: NURSE PRACTITIONER | Admitting: NURSE PRACTITIONER
Payer: MEDICARE

## 2018-10-17 DIAGNOSIS — Z79.4 TYPE 2 DIABETES MELLITUS WITH HYPERGLYCEMIA, WITH LONG-TERM CURRENT USE OF INSULIN (H): Primary | ICD-10-CM

## 2018-10-17 DIAGNOSIS — E11.65 TYPE 2 DIABETES MELLITUS WITH HYPERGLYCEMIA, WITH LONG-TERM CURRENT USE OF INSULIN (H): Primary | ICD-10-CM

## 2018-10-17 PROCEDURE — G0108 DIAB MANAGE TRN  PER INDIV: HCPCS

## 2018-10-17 ASSESSMENT — ANXIETY QUESTIONNAIRES
1. FEELING NERVOUS, ANXIOUS, OR ON EDGE: NOT AT ALL
GAD7 TOTAL SCORE: 8
6. BECOMING EASILY ANNOYED OR IRRITABLE: MORE THAN HALF THE DAYS
5. BEING SO RESTLESS THAT IT IS HARD TO SIT STILL: NEARLY EVERY DAY
7. FEELING AFRAID AS IF SOMETHING AWFUL MIGHT HAPPEN: NOT AT ALL
2. NOT BEING ABLE TO STOP OR CONTROL WORRYING: NOT AT ALL
3. WORRYING TOO MUCH ABOUT DIFFERENT THINGS: NEARLY EVERY DAY
IF YOU CHECKED OFF ANY PROBLEMS ON THIS QUESTIONNAIRE, HOW DIFFICULT HAVE THESE PROBLEMS MADE IT FOR YOU TO DO YOUR WORK, TAKE CARE OF THINGS AT HOME, OR GET ALONG WITH OTHER PEOPLE: NOT DIFFICULT AT ALL

## 2018-10-17 ASSESSMENT — PAIN SCALES - GENERAL: PAINLEVEL: MODERATE PAIN (5)

## 2018-10-17 ASSESSMENT — PATIENT HEALTH QUESTIONNAIRE - PHQ9: 5. POOR APPETITE OR OVEREATING: NOT AT ALL

## 2018-10-17 NOTE — PATIENT INSTRUCTIONS
Increase Lantus to 36 units daily    I will call for BG readings on Wed, 10/24/18    I will contact Amberly Whyte NP, about doing a CGMS    Return to Diabetes Ed : Wednesday, October 31, 2018 at 12:45 pm.

## 2018-10-17 NOTE — PROGRESS NOTES
"Diabetes Self-Management Education & Support    Diabetes Education Self Management & Training    SUBJECTIVE/OBJECTIVE:  Presents for: Follow-up  Accompanied by: Self  Diabetes education in the past 24mo: Yes  Focus of Visit: Monitoring, Taking Medication  Insulin Type: Lantus  Diabetes type: Type 2  Disease course: Stable  Diabetes management related comments/concerns: BG readings remain elevated  Transportation concerns: No  Other concerns:: Glasses, Cognitive impairment  Cultural Influences/Ethnic Background:  American    Diabetes Symptoms & Complications  Blurred vision: No  Fatigue: No  Neuropathy: No  Foot ulcerations: No  Polydipsia: No  Polyphagia: No  Polyuria: No  Visual change: No  Weakness: No  Weight loss: No  Slow healing wounds: No  Recent Infection(s): No  Symptom course: Stable  Weight trend: Increasing steadily  Autonomic neuropathy: No  CVA: No  Heart disease: No  Nephropathy: Yes  Peripheral neuropathy: No  Peripheral Vascular Disease: No  Retinopathy: No  Sexual dysfunction: No    Patient Problem List and Family Medical History reviewed for relevant medical history, current medical status, and diabetes risk factors.    Vitals:  /92  Pulse 82  Resp 16  Ht 1.619 m (5' 3.75\")  Wt 106.3 kg (234 lb 4.8 oz)  SpO2 92%  BMI 40.53 kg/m2  Estimated body mass index is 40.53 kg/(m^2) as calculated from the following:    Height as of this encounter: 1.619 m (5' 3.75\").    Weight as of this encounter: 106.3 kg (234 lb 4.8 oz).   Last 3 BP:   BP Readings from Last 3 Encounters:   10/17/18 138/92   09/21/18 (!) 114/102   09/19/18 137/84     BP re-checked at the end of our appointment: 124/86    History   Smoking Status     Current Every Day Smoker     Packs/day: 1.00     Years: 34.00     Types: Cigarettes     Start date: 1/1/1979   Smokeless Tobacco     Never Used       Labs:  Lab Results   Component Value Date    A1C 7.7 05/22/2018     Lab Results   Component Value Date     09/21/2018     Lab " Results   Component Value Date     2016     HDL Cholesterol   Date Value Ref Range Status   02/10/2017 44 40 - 60 mg/dL Final   ]  GFR Estimate   Date Value Ref Range Status   2018 44 (L) >60 mL/min/1.7m2 Final     Comment:     Non  GFR Calc     GFR Estimate If Black   Date Value Ref Range Status   2018 54 (L) >60 mL/min/1.7m2 Final     Comment:      GFR Calc     Lab Results   Component Value Date    CR 1.25 2018     No results found for: MICROALBUMIN    Last A1C at [facility]: 18: 9.6%    Healthy Eating  Healthy Eating Assessed Today: Yes  Cultural/Pentecostalism diet restrictions?: No  Meal planning: Smaller portions, Avoiding sweets  Meals include: Breakfast, Dinner  Breakfast: cereal or oatmeal with milk and coffee  Dinner: Taco salad, spaghetti, tuna salad with noodles or potato salada  Beverages: Water, Coffee, Soda  Has patient met with a dietitian in the past?: Yes    Being Active  Exercise:: Currently not exercising  Barrier to exercise: Access    Monitoring  Monitoring Assessed Today: Yes  Did patient bring glucose meter to appointment? : Yes  Home Glucose (Sugar) Monitorin-2 times per day  Blood glucose trend: Decreasing steadily  High Glucose Range (mg/dL): >200    Taking Medications  Diabetes Medication(s)     Insulin Sig    insulin glargine (LANTUS SOLOSTAR) 100 UNIT/ML pen Inject 20 Units Subcutaneous At Bedtime     Patient taking differently:  Inject 17 Units Subcutaneous At Bedtime     Sulfonylureas Sig    GLIPIZIDE PO Take 10 mg by mouth 2 times daily (before meals)          Current Treatments: Insulin Injections, Oral Agent (monotherapy)  Dose schedule: pre-dinner  Given by: Patient  Injection/Infusion sites: Abdomen  Problems taking diabetes medications regularly?: Yes  Diabetes medication side effects?: No  Treatment Compliance: Most of the time    Problem Solving  Hypoglycemia Frequency: Rarely  Patient carries a carbohydrate  source: No  Medical alert: No  Severe weather/disaster plan for diabetes management?: No  DKA prevention plan?: No    Hypoglycemia symptoms  Confusion: No  Dizziness or Light-Headedness: No  Headaches: No  Hunger: Yes  Mood changes: No  Nervousness/Anxiety: No  Sleepiness: No  Speech difficulty: No  Sweats: Yes  Tremors: Yes    Hypoglycemia Complications  Blackouts: No  Hospitalization: No  Nocturnal hypoglycemia: No  Required assistance: No  Required glucagon injection: No  Seizures: No    Reducing Risks  Diabetes Risks: Age over 45 years, Hyperlipidemia  CAD Risks: Diabetes Mellitus, Dyslipidemia, Hypertension, Tobacco exposure    Healthy Coping  Healthy Coping Assessed Today: Yes  Emotional response to diabetes: Acceptance  Informal Support system:: Significant other  Stage of change: ACTION (Actively working towards change)  Difficulty affording diabetes management supplies?: No  Support resources: None  Patient Activation Measure Survey Score:  SHADI Score (Last Two) 2018   SHADI Raw Score 28   Activation Score 50   SHADI Level 2       ASSESSMENT:  Readings range as follows: fastin-281 and post-supper: 346, 407    Marly struggles with testing after supper.        Patient's most recent : 18: 9.6  Lab Results   Component Value Date    A1C 7.7 2018    is not meeting goal of <7.0    INTERVENTION:   Diabetes knowledge and skills assessment:     Patient is knowledgeable in diabetes management concepts related to: Monitoring and Taking Medication    Patient needs further education on the following diabetes management concepts: Healthy Eating, Monitoring and Taking Medication    Based on learning assessment above, most appropriate setting for further diabetes education would be: Individual setting.    Education provided today on:  AADE Self-Care Behaviors:  Monitoring: individual blood glucose targets, frequency of monitoring and benefits and procedure of CGM study  Taking Medication: action of  prescribed medication, proper site selection and rotation for injections, side effects of prescribed medications and when to take medications    Opportunities for ongoing education and support in diabetes-self management were discussed.    Pt verbalized understanding of concepts discussed and recommendations provided today.     .  Education Materials Provided:  No new materials provided today    PLAN:  See Patient Instructions for co-developed, patient-stated behavior change goals.    Increase Lantus to 36 units daily    I will call for BG readings on Wed, 10/24/18    I will contact Amberly Whyte NP, about doing a CGMS(may need to start mealtime insulin)       Return to Diabetes Ed : Wednesday, October 31, 2018 at 12:45 pm.    AVS printed and provided to patient today. See Follow-Up section for recommended follow-up.      Time Spent: 30 minutes  Encounter Type: Individual    Any diabetes medication dose changes were made via the CDE Protocol and Collaborative Practice Agreement with the patient's primary care provider. A copy of this encounter was shared with the provider.

## 2018-10-17 NOTE — IP AVS SNAPSHOT
MRN:[id number]                      After Visit Summary   10/17/2018    Marly Cannon    MRN: [id number]           Thank you!     Thank you for choosing [facility] for your care. Our goal is always to provide you with excellent care. Hearing back from our patients is one way we can continue to improve our services. Please take a few minutes to complete the written survey that you may receive in the mail after you visit with us. Thank you!        Patient Information     Date Of Birth          1965        About your hospital stay     You were admitted on:  October 17, 2018 You last received care in the:  [facility]    You were discharged on:  October 17, 2018       Who to Call     For medical emergencies, please call 911.  For non-urgent questions about your medical care, please call your primary care provider or clinic, [phone]          Attending Provider     Provider Specialty    Courtney Chaudhary NP [facility]       Primary Care Provider Office Phone # Fax #    Amberly Whyte [phone]      Your next 10 appointments already scheduled     Oct 31, 2018  1:00 PM CDT   (Arrive by 12:45 PM)   Diabetes Education with Kat Dawn RN   [facility] ([facility] )    [address] [place] [id number] [phone]              Follow-up notes from your care team     Return in about 2 weeks (around 10/31/2018).      Care Instructions    Increase Lantus to 36 units daily    I will call for BG readings on Wed, 10/24/18    I will contact Amberly Whyte NP, about doing a CGMS    Return to Diabetes Ed : Wednesday, October 31, 2018 at 12:45 pm.         Pending Results     No orders found from 10/15/2018 to 10/18/2018.            Admission Information     Date & Time Provider Department Dept. Phone    10/17/2018 Courtney Chaudhary NP [facility] [phone]      Your Vitals Were     Blood Pressure Pulse Respirations Height Weight  "Pulse Oximetry    138/92 82 16 1.619 m (5' 3.75\") 106.3 kg (234 lb 4.8 oz) 92%    BMI (Body Mass Index)                   40.53 kg/m2           Care EveryWhere ID     This is your Care EveryWhere ID. This could be used by other organizations to access your [facility] medical records  [id number]        Equal Access to Services     MIGUEL VIRGEN : Hadii aad ku hadasho Sodanieali, waaxda luqadaha, qaybta kaalmada adeegyada, chandra pinon haymckinleyn jonia tristan lalukeshree . So [facility] [phone].    ATENCIÓN: Si habla español, tiene a crook disposición servicios gratuitos de asistencia lingüística. Llkhadar al [phone].    We comply with applicable federal civil rights laws and [place] laws. We do not discriminate on the basis of race, color, national origin, age, disability, sex, sexual orientation, or gender identity.               Review of your medicines      UNREVIEWED medicines. Ask your doctor about these medicines        Dose / Directions    AMLODIPINE BESYLATE PO        Dose:  2.5 mg   Take 2.5 mg by mouth daily   Refills:  0       ASPIRIN EC PO        Dose:  81 mg   Take 81 mg by mouth daily   Refills:  0       COMBIVENT RESPIMAT  MCG/ACT inhaler   Generic drug:  Ipratropium-Albuterol        Dose:  1 puff   Inhale 1 puff into the lungs 4 times daily   Refills:  0       DEPO-PROVERA IM        Refills:  0       FLEXERIL PO        Dose:  10 mg   Take 10 mg by mouth 3 times daily as needed for muscle spasms   Refills:  0       GLIPIZIDE PO        Dose:  10 mg   Take 10 mg by mouth 2 times daily (before meals)   Refills:  0       HYDROCHLOROTHIAZIDE PO        Dose:  12.5 mg   Take 12.5 mg by mouth daily   Refills:  0       hydrocortisone 2.5 % cream        Apply topically 2 times daily   Refills:  0       insulin glargine 100 UNIT/ML injection   Commonly known as:  LANTUS SOLOSTAR   Used for:  Type 2 diabetes mellitus with hyperglycemia, without long-term current use of insulin (H)        Dose:  20 Units   Inject 20 " Units Subcutaneous At Bedtime   Quantity:  15 mL   Refills:  3       LAC-HYDRIN 12 % cream   Generic drug:  ammonium lactate        Apply topically 2 times daily   Refills:  0       LISINOPRIL PO        Dose:  40 mg   Take 40 mg by mouth daily   Refills:  0       PRIMIDONE PO        Dose:  50 mg   Take 50 mg by mouth At Bedtime   Refills:  0       SIMVASTATIN PO        Dose:  20 mg   Take 20 mg by mouth At Bedtime   Refills:  0       triamcinolone 0.1 % cream   Commonly known as:  KENALOG        Apply topically 2 times daily   Refills:  0       TYLENOL PO        Refills:  0       VITAMIN D-3 PO        Dose:  2000 Units   Take 2,000 Units by mouth daily   Refills:  0         CONTINUE these medicines which have NOT CHANGED        Dose / Directions    insulin pen needle 32G X 4 MM   Used for:  Type 2 diabetes mellitus with hyperglycemia, without long-term current use of insulin (H)        Use 1 pen needles daily   Quantity:  100 each   Refills:  10                Protect others around you: Learn how to safely use, store and throw away your medicines at www.disposemymeds.org.             Medication List: This is a list of all your medications and when to take them. Check marks below indicate your daily home schedule. Keep this list as a reference.      Medications           Morning Afternoon Evening Bedtime As Needed    AMLODIPINE BESYLATE PO   Take 2.5 mg by mouth daily                                ASPIRIN EC PO   Take 81 mg by mouth daily                                COMBIVENT RESPIMAT  MCG/ACT inhaler   Inhale 1 puff into the lungs 4 times daily   Generic drug:  Ipratropium-Albuterol                                DEPO-PROVERA IM                                FLEXERIL PO   Take 10 mg by mouth 3 times daily as needed for muscle spasms                                GLIPIZIDE PO   Take 10 mg by mouth 2 times daily (before meals)                                HYDROCHLOROTHIAZIDE PO   Take 12.5 mg by mouth  daily                                hydrocortisone 2.5 % cream   Apply topically 2 times daily                                insulin glargine 100 UNIT/ML injection   Commonly known as:  LANTUS SOLOSTAR   Inject 20 Units Subcutaneous At Bedtime                                insulin pen needle 32G X 4 MM   Use 1 pen needles daily                                LAC-HYDRIN 12 % cream   Apply topically 2 times daily   Generic drug:  ammonium lactate                                LISINOPRIL PO   Take 40 mg by mouth daily                                PRIMIDONE PO   Take 50 mg by mouth At Bedtime                                SIMVASTATIN PO   Take 20 mg by mouth At Bedtime                                triamcinolone 0.1 % cream   Commonly known as:  KENALOG   Apply topically 2 times daily                                TYLENOL PO                                VITAMIN D-3 PO   Take 2,000 Units by mouth daily

## 2018-10-18 ASSESSMENT — ANXIETY QUESTIONNAIRES: GAD7 TOTAL SCORE: 8

## 2018-10-18 ASSESSMENT — PATIENT HEALTH QUESTIONNAIRE - PHQ9: SUM OF ALL RESPONSES TO PHQ QUESTIONS 1-9: 11

## 2018-10-20 VITALS
SYSTOLIC BLOOD PRESSURE: 124 MMHG | BODY MASS INDEX: 40 KG/M2 | OXYGEN SATURATION: 92 % | RESPIRATION RATE: 16 BRPM | DIASTOLIC BLOOD PRESSURE: 86 MMHG | WEIGHT: 234.3 LBS | HEART RATE: 82 BPM | HEIGHT: 64 IN

## 2018-10-31 ENCOUNTER — HOSPITAL ENCOUNTER (OUTPATIENT)
Dept: EDUCATION SERVICES | Facility: HOSPITAL | Age: 53
Discharge: HOME | End: 2018-10-31
Attending: NURSE PRACTITIONER | Admitting: NURSE PRACTITIONER
Payer: MEDICARE

## 2018-10-31 VITALS
BODY MASS INDEX: 40.31 KG/M2 | DIASTOLIC BLOOD PRESSURE: 93 MMHG | RESPIRATION RATE: 20 BRPM | HEIGHT: 64 IN | HEART RATE: 94 BPM | OXYGEN SATURATION: 90 % | SYSTOLIC BLOOD PRESSURE: 148 MMHG | WEIGHT: 236.1 LBS

## 2018-10-31 DIAGNOSIS — Z79.4 TYPE 2 DIABETES MELLITUS WITH HYPERGLYCEMIA, WITH LONG-TERM CURRENT USE OF INSULIN (H): ICD-10-CM

## 2018-10-31 DIAGNOSIS — E11.65 TYPE 2 DIABETES MELLITUS WITH HYPERGLYCEMIA, WITH LONG-TERM CURRENT USE OF INSULIN (H): ICD-10-CM

## 2018-10-31 PROCEDURE — 95250 CONT GLUC MNTR PHYS/QHP EQP: CPT | Performed by: NURSE PRACTITIONER

## 2018-10-31 PROCEDURE — G0108 DIAB MANAGE TRN  PER INDIV: HCPCS

## 2018-10-31 ASSESSMENT — PAIN SCALES - GENERAL: PAINLEVEL: NO PAIN (0)

## 2018-10-31 NOTE — PROGRESS NOTES
"Diabetes Self-Management Education & Support    Diabetes Education Self Management & Training    SUBJECTIVE/OBJECTIVE:  Presents for: Follow-up  Accompanied by: Self  Diabetes education in the past 24mo: Yes  Focus of Visit: Monitoring, Taking Medication  Insulin Type: Lantus  Diabetes type: Type 2  Disease course: Stable  Diabetes management related comments/concerns: BG readings remain elevated  Transportation concerns: No  Other concerns:: Glasses, Cognitive impairment  Cultural Influences/Ethnic Background:  American      Diabetes Symptoms & Complications  Blurred vision: No  Fatigue: No  Neuropathy: No  Foot ulcerations: No  Polydipsia: No  Polyphagia: No  Polyuria: No  Visual change: No  Weakness: No  Weight loss: No  Slow healing wounds: No  Recent Infection(s): No  Symptom course: Stable  Weight trend: Increasing steadily  Autonomic neuropathy: No  CVA: No  Heart disease: No  Nephropathy: Yes  Peripheral neuropathy: No  Peripheral Vascular Disease: No  Retinopathy: No  Sexual dysfunction: No    Patient Problem List and Family Medical History reviewed for relevant medical history, current medical status, and diabetes risk factors.    Vitals:  /93  Pulse 94  Resp 20  Ht 1.626 m (5' 4\")  Wt 107.1 kg (236 lb 1.6 oz)  SpO2 (!) 90%  BMI 40.53 kg/m2  Estimated body mass index is 40.53 kg/(m^2) as calculated from the following:    Height as of this encounter: 1.626 m (5' 4\").    Weight as of this encounter: 107.1 kg (236 lb 1.6 oz).   Last 3 BP:   BP Readings from Last 3 Encounters:   10/31/18 148/93   10/17/18 124/86   09/21/18 (!) 114/102       History   Smoking Status     Current Every Day Smoker     Packs/day: 1.00     Years: 34.00     Types: Cigarettes     Start date: 1/1/1979   Smokeless Tobacco     Never Used       Labs:  Lab Results   Component Value Date    A1C 9.6 08/21/2018     Lab Results   Component Value Date     09/21/2018     Lab Results   Component Value Date     04/29/2016 "     HDL Cholesterol   Date Value Ref Range Status   02/10/2017 44 40 - 60 mg/dL Final   ]  GFR Estimate   Date Value Ref Range Status   2018 44 (L) >60 mL/min/1.7m2 Final     Comment:     Non  GFR Calc     GFR Estimate If Black   Date Value Ref Range Status   2018 54 (L) >60 mL/min/1.7m2 Final     Comment:      GFR Calc     Lab Results   Component Value Date    CR 1.25 2018     No results found for: MICROALBUMIN    Healthy Eating  Healthy Eating Assessed Today: Yes  Cultural/Alevism diet restrictions?: No  Meal planning: Smaller portions, Avoiding sweets  Meals include: Breakfast, Dinner  Breakfast: cereal or oatmeal with milk and coffee  Dinner: Taco salad, spaghetti, tuna salad with noodles or potato salada  Beverages: Water, Coffee, Soda  Has patient met with a dietitian in the past?: Yes    Being Active  Exercise:: Currently not exercising  Barrier to exercise: Access    Monitoring  Monitoring Assessed Today: Yes  Did patient bring glucose meter to appointment? : Yes  Home Glucose (Sugar) Monitorin-2 times per day  Blood glucose trend: No change  Low Glucose Range (mg/dL): >200  High Glucose Range (mg/dL): >200  Overall Range (mg/dL): >200        Taking Medications  Diabetes Medication(s)     Insulin Sig    insulin glargine (LANTUS SOLOSTAR) 100 UNIT/ML pen Inject 40 Units Subcutaneous At Bedtime    Sulfonylureas Sig    GLIPIZIDE PO Take 10 mg by mouth 2 times daily (before meals)          Current Treatments: Insulin Injections, Oral Agent (monotherapy)  Dose schedule: pre-dinner  Given by: Patient  Injection/Infusion sites: Abdomen  Problems taking diabetes medications regularly?: No  Diabetes medication side effects?: No  Treatment Compliance: Most of the time    Problem Solving  Hypoglycemia Frequency: Rarely  Patient carries a carbohydrate source: No  Medical alert: No  Severe weather/disaster plan for diabetes management?: No  DKA prevention plan?:  No    Hypoglycemia symptoms  Confusion: No  Dizziness or Light-Headedness: No  Headaches: No  Hunger: Yes  Mood changes: No  Nervousness/Anxiety: No  Sleepiness: No  Speech difficulty: No  Sweats: Yes  Tremors: Yes    Hypoglycemia Complications  Blackouts: No  Hospitalization: No  Nocturnal hypoglycemia: No  Required assistance: No  Required glucagon injection: No  Seizures: No    Reducing Risks  Diabetes Risks: Age over 45 years, Hyperlipidemia  CAD Risks: Diabetes Mellitus, Dyslipidemia, Hypertension, Tobacco exposure    Healthy Coping  Healthy Coping Assessed Today: Yes  Emotional response to diabetes: Acceptance  Informal Support system:: Significant other  Stage of change: ACTION (Actively working towards change)  Difficulty affording diabetes management supplies?: No  Support resources: None  Patient Activation Measure Survey Score:  SHADI Score (Last Two) 2018   SHADI Raw Score 28   Activation Score 50   SHADI Level 2       ASSESSMENT:  Readings range as follows: fastin-266 and post-meal: 490. Rarely testing post-meal.    Check meter today for accuracy. Results appropriate. Checked BG during visit: 230, 4.5 hrs breakfast    Patient's most recent   Lab Results   Component Value Date    A1C 9.6 2018    is not meeting goal of <8.0    INTERVENTION:   Diabetes knowledge and skills assessment:     Patient is knowledgeable in diabetes management concepts related to: Monitoring and Taking Medication    Patient needs further education on the following diabetes management concepts: Taking Medication and Problem Solving    Based on learning assessment above, most appropriate setting for further diabetes education would be: Individual setting.    Education provided today on:  AADE Self-Care Behaviors:  Monitoring: individual blood glucose targets, frequency of monitoring and use of glucose control solution  Taking Medication: action of prescribed medication, drawing up, administering and storing injectable  diabetes medications, proper site selection and rotation for injections, side effects of prescribed medications and when to take medications    Introduced the idea of starting mealtime insulin. Marly is open to this.    Opportunities for ongoing education and support in diabetes-self management were discussed.    Pt verbalized understanding of concepts discussed and recommendations provided today.       CGM Study:    Sensor agreement signed.    Sensor attached to left upper arm. No redness, drainage or bleeding noted. Marly instructed on testing schedule, how to complete the patient log, restrictions during wear, and to remove if needs to have MRI, CT or x-ray. Marly will return on 11/5/18 for download and on 11/14/18 for detach and evaluation.     Sensor Lot #: [id number]  Exp date: 3/31/19  Transmitter S/N:  [id number]    Written instructions and patient log given to Marly.      Education Materials Provided:  No new materials provided today    PLAN:  See Patient Instructions for co-developed, patient-stated behavior change goals.  Increase Lantus to 40 units daily    Return to see Kat on Monday, 11/5/18, 11:00 am - CGM download and possible mealtime insulin start    Return for CGM Evaluation with Sera Casanova NP, on 11/14/2018, at 3 pm    AVS printed and provided to patient today. See Follow-Up section for recommended follow-up.      Time Spent: 40 minutes  Encounter Type: Individual    Any diabetes medication dose changes were made via the CDE Protocol and Collaborative Practice Agreement with the patient's primary care provider. A copy of this encounter was shared with the provider.

## 2018-10-31 NOTE — PATIENT INSTRUCTIONS
Increase Lantus to 40 units daily    Return to see Kat on Monday, 11/5/18, 11:00 am - CGM download and possible mealtime insulin start    Return for CGM Evaluation with Sera Casanova NP, on 11/14/2018, at 3 pm

## 2018-10-31 NOTE — LETTER
"    10/31/2018        RE: Marly Cannon  [address]  [place] [address]        Diabetes Self-Management Education & Support    Diabetes Education Self Management & Training    SUBJECTIVE/OBJECTIVE:  Presents for: Follow-up  Accompanied by: Self  Diabetes education in the past 24mo: Yes  Focus of Visit: Monitoring, Taking Medication  Insulin Type: Lantus  Diabetes type: Type 2  Disease course: Stable  Diabetes management related comments/concerns: BG readings remain elevated  Transportation concerns: No  Other concerns:: Glasses, Cognitive impairment  Cultural Influences/Ethnic Background:  American      Diabetes Symptoms & Complications  Blurred vision: No  Fatigue: No  Neuropathy: No  Foot ulcerations: No  Polydipsia: No  Polyphagia: No  Polyuria: No  Visual change: No  Weakness: No  Weight loss: No  Slow healing wounds: No  Recent Infection(s): No  Symptom course: Stable  Weight trend: Increasing steadily  Autonomic neuropathy: No  CVA: No  Heart disease: No  Nephropathy: Yes  Peripheral neuropathy: No  Peripheral Vascular Disease: No  Retinopathy: No  Sexual dysfunction: No    Patient Problem List and Family Medical History reviewed for relevant medical history, current medical status, and diabetes risk factors.    Vitals:  /93  Pulse 94  Resp 20  Ht 1.626 m (5' 4\")  Wt 107.1 kg (236 lb 1.6 oz)  SpO2 (!) 90%  BMI 40.53 kg/m2  Estimated body mass index is 40.53 kg/(m^2) as calculated from the following:    Height as of this encounter: 1.626 m (5' 4\").    Weight as of this encounter: 107.1 kg (236 lb 1.6 oz).   Last 3 BP:   BP Readings from Last 3 Encounters:   10/31/18 148/93   10/17/18 124/86   09/21/18 (!) 114/102       History   Smoking Status     Current Every Day Smoker     Packs/day: 1.00     Years: 34.00     Types: Cigarettes     Start date: 1/1/1979   Smokeless Tobacco     Never Used       Labs:  Lab Results   Component Value Date    A1C 9.6 08/21/2018     Lab Results   Component " Value Date     2018     Lab Results   Component Value Date     2016     HDL Cholesterol   Date Value Ref Range Status   02/10/2017 44 40 - 60 mg/dL Final   ]  GFR Estimate   Date Value Ref Range Status   2018 44 (L) >60 mL/min/1.7m2 Final     Comment:     Non  GFR Calc     GFR Estimate If Black   Date Value Ref Range Status   2018 54 (L) >60 mL/min/1.7m2 Final     Comment:      GFR Calc     Lab Results   Component Value Date    CR 1.25 2018     No results found for: MICROALBUMIN    Healthy Eating  Healthy Eating Assessed Today: Yes  Cultural/Spiritism diet restrictions?: No  Meal planning: Smaller portions, Avoiding sweets  Meals include: Breakfast, Dinner  Breakfast: cereal or oatmeal with milk and coffee  Dinner: Taco salad, spaghetti, tuna salad with noodles or potato salada  Beverages: Water, Coffee, Soda  Has patient met with a dietitian in the past?: Yes    Being Active  Exercise:: Currently not exercising  Barrier to exercise: Access    Monitoring  Monitoring Assessed Today: Yes  Did patient bring glucose meter to appointment? : Yes  Home Glucose (Sugar) Monitorin-2 times per day  Blood glucose trend: No change  Low Glucose Range (mg/dL): >200  High Glucose Range (mg/dL): >200  Overall Range (mg/dL): >200        Taking Medications  Diabetes Medication(s)     Insulin Sig    insulin glargine (LANTUS SOLOSTAR) 100 UNIT/ML pen Inject 40 Units Subcutaneous At Bedtime    Sulfonylureas Sig    GLIPIZIDE PO Take 10 mg by mouth 2 times daily (before meals)          Current Treatments: Insulin Injections, Oral Agent (monotherapy)  Dose schedule: pre-dinner  Given by: Patient  Injection/Infusion sites: Abdomen  Problems taking diabetes medications regularly?: No  Diabetes medication side effects?: No  Treatment Compliance: Most of the time    Problem Solving  Hypoglycemia Frequency: Rarely  Patient carries a carbohydrate source: No  Medical  alert: No  Severe weather/disaster plan for diabetes management?: No  DKA prevention plan?: No    Hypoglycemia symptoms  Confusion: No  Dizziness or Light-Headedness: No  Headaches: No  Hunger: Yes  Mood changes: No  Nervousness/Anxiety: No  Sleepiness: No  Speech difficulty: No  Sweats: Yes  Tremors: Yes    Hypoglycemia Complications  Blackouts: No  Hospitalization: No  Nocturnal hypoglycemia: No  Required assistance: No  Required glucagon injection: No  Seizures: No    Reducing Risks  Diabetes Risks: Age over 45 years, Hyperlipidemia  CAD Risks: Diabetes Mellitus, Dyslipidemia, Hypertension, Tobacco exposure    Healthy Coping  Healthy Coping Assessed Today: Yes  Emotional response to diabetes: Acceptance  Informal Support system:: Significant other  Stage of change: ACTION (Actively working towards change)  Difficulty affording diabetes management supplies?: No  Support resources: None  Patient Activation Measure Survey Score:  SHADI Score (Last Two) 2018   SHADI Raw Score 28   Activation Score 50   SHADI Level 2       ASSESSMENT:  Readings range as follows: fastin-266 and post-meal: 490. Rarely testing post-meal.    Check meter today for accuracy. Results appropriate. Checked BG during visit: 230, 4.5 hrs breakfast    Patient's most recent   Lab Results   Component Value Date    A1C 9.6 2018    is not meeting goal of <8.0    INTERVENTION:   Diabetes knowledge and skills assessment:     Patient is knowledgeable in diabetes management concepts related to: Monitoring and Taking Medication    Patient needs further education on the following diabetes management concepts: Taking Medication and Problem Solving    Based on learning assessment above, most appropriate setting for further diabetes education would be: Individual setting.    Education provided today on:  AADE Self-Care Behaviors:  Monitoring: individual blood glucose targets, frequency of monitoring and use of glucose control solution  Taking  Medication: action of prescribed medication, drawing up, administering and storing injectable diabetes medications, proper site selection and rotation for injections, side effects of prescribed medications and when to take medications    Introduced the idea of starting mealtime insulin. Marly is open to this.    Opportunities for ongoing education and support in diabetes-self management were discussed.    Pt verbalized understanding of concepts discussed and recommendations provided today.       CGM Study:    Sensor agreement signed.    Sensor attached to left upper arm. No redness, drainage or bleeding noted. Marly instructed on testing schedule, how to complete the patient log, restrictions during wear, and to remove if needs to have MRI, CT or x-ray. Marly will return on 11/5/18 for download and on 11/14/18 for detach and evaluation.     Sensor Lot #: [id number]  Exp date: 3/31/19  Transmitter S/N:  [id number]    Written instructions and patient log given to Marly.      Education Materials Provided:  No new materials provided today    PLAN:  See Patient Instructions for co-developed, patient-stated behavior change goals.  Increase Lantus to 40 units daily    Return to see Kat on Monday, 11/5/18, 11:00 am - CGM download and possible mealtime insulin start    Return for CGM Evaluation with Sera Casanova NP, on 11/14/2018, at 3 pm    AVS printed and provided to patient today. See Follow-Up section for recommended follow-up.      Time Spent: 40 minutes  Encounter Type: Individual    Any diabetes medication dose changes were made via the CDE Protocol and Collaborative Practice Agreement with the patient's primary care provider. A copy of this encounter was shared with the provider.          Sincerely,        Kat Dawn RN

## 2018-10-31 NOTE — IP AVS SNAPSHOT
"                  MRN:[id number]                      After Visit Summary   10/31/2018    Marly Cannon    MRN: [id number]           Thank you!     Thank you for choosing [facility] for your care. Our goal is always to provide you with excellent care. Hearing back from our patients is one way we can continue to improve our services. Please take a few minutes to complete the written survey that you may receive in the mail after you visit with us. Thank you!        Patient Information     Date Of Birth          1965        About your hospital stay     You were admitted on:  October 31, 2018 You last received care in the:  [facility]    You were discharged on:  October 31, 2018       Who to Call     For medical emergencies, please call 911.  For non-urgent questions about your medical care, please call your primary care provider or clinic, [phone]          Attending Provider     Provider Specialty    Sera Casanova APRN CNP Family Practice       Primary Care Provider Office Phone # Fax #    Amberly Whyte [phone]      Care Instructions    Increase Lantus to 40 units daily    Return to see Kat on Monday, 11/5/18, 11:00 am - CGM download and possible mealtime insulin start    Return for CGM Evaluation with Sera Casanova NP, on 11/14/2018, at 3 pm         Pending Results     No orders found from 10/29/2018 to 11/1/2018.            Admission Information     Date & Time Provider Department Dept. Phone    10/31/2018 Sera Casanova APRN CNP [facility] [phone]      Your Vitals Were     Blood Pressure Pulse Respirations Height Weight Pulse Oximetry    153/98 94 20 1.626 m (5' 4\") 107.1 kg (236 lb 1.6 oz) 90%    BMI (Body Mass Index)                   40.53 kg/m2           Care EveryWhere ID     This is your Care EveryWhere ID. This could be used by other organizations to access your [facility] medical records  [id number]        Equal Access to " Services     [facility]: Hadii aad ku haddicksonjorge Damon, waaxda luqadaha, qaybta kaalmada adeegdillan, chandra caceres . So [facility] [phone].    ATENCIÓN: Si tonela luís, tiene a crook disposición servicios gratuitos de asistencia lingüística. Llame al [phone].    We comply with applicable federal civil rights laws and [place] laws. We do not discriminate on the basis of race, color, national origin, age, disability, sex, sexual orientation, or gender identity.               Review of your medicines      UNREVIEWED medicines. Ask your doctor about these medicines        Dose / Directions    AMLODIPINE BESYLATE PO        Dose:  2.5 mg   Take 2.5 mg by mouth daily   Refills:  0       ASPIRIN EC PO        Dose:  81 mg   Take 81 mg by mouth daily   Refills:  0       COMBIVENT RESPIMAT  MCG/ACT inhaler   Generic drug:  Ipratropium-Albuterol        Dose:  1 puff   Inhale 1 puff into the lungs 4 times daily   Refills:  0       DEPO-PROVERA IM        Refills:  0       FLEXERIL PO        Dose:  10 mg   Take 10 mg by mouth 3 times daily as needed for muscle spasms   Refills:  0       GLIPIZIDE PO        Dose:  10 mg   Take 10 mg by mouth 2 times daily (before meals)   Refills:  0       HYDROCHLOROTHIAZIDE PO        Dose:  12.5 mg   Take 12.5 mg by mouth daily   Refills:  0       hydrocortisone 2.5 % cream        Apply topically 2 times daily   Refills:  0       LAC-HYDRIN 12 % cream   Generic drug:  ammonium lactate        Apply topically 2 times daily   Refills:  0       LISINOPRIL PO        Dose:  40 mg   Take 40 mg by mouth daily   Refills:  0       PRIMIDONE PO        Dose:  50 mg   Take 50 mg by mouth At Bedtime   Refills:  0       SIMVASTATIN PO        Dose:  20 mg   Take 20 mg by mouth At Bedtime   Refills:  0       triamcinolone 0.1 % cream   Commonly known as:  KENALOG        Apply topically 2 times daily   Refills:  0       TYLENOL PO        Refills:  0       VITAMIN D-3 PO         Dose:  2000 Units   Take 2,000 Units by mouth daily   Refills:  0         CONTINUE these medicines which may have CHANGED, or have new prescriptions. If we are uncertain of the size of tablets/capsules you have at home, strength may be listed as something that might have changed.        Dose / Directions    insulin glargine 100 UNIT/ML injection   Commonly known as:  LANTUS SOLOSTAR   This may have changed:  how much to take   Used for:  Type 2 diabetes mellitus with hyperglycemia, without long-term current use of insulin (H)        Dose:  40 Units   Inject 40 Units Subcutaneous At Bedtime   Quantity:  15 mL   Refills:  3         CONTINUE these medicines which have NOT CHANGED        Dose / Directions    insulin pen needle 32G X 4 MM   Used for:  Type 2 diabetes mellitus with hyperglycemia, without long-term current use of insulin (H)        Use 1 pen needles daily   Quantity:  100 each   Refills:  10            Where to get your medicines      These medications were sent to [facility] - [address]  [address] [place] [address]     Phone:  [phone]     insulin glargine 100 UNIT/ML injection                Protect others around you: Learn how to safely use, store and throw away your medicines at www.disposemymeds.org.             Medication List: This is a list of all your medications and when to take them. Check marks below indicate your daily home schedule. Keep this list as a reference.      Medications           Morning Afternoon Evening Bedtime As Needed    AMLODIPINE BESYLATE PO   Take 2.5 mg by mouth daily                                ASPIRIN EC PO   Take 81 mg by mouth daily                                COMBIVENT RESPIMAT  MCG/ACT inhaler   Inhale 1 puff into the lungs 4 times daily   Generic drug:  Ipratropium-Albuterol                                DEPO-PROVERA IM                                FLEXERIL PO   Take 10 mg by mouth 3 times daily as needed  for muscle spasms                                GLIPIZIDE PO   Take 10 mg by mouth 2 times daily (before meals)                                HYDROCHLOROTHIAZIDE PO   Take 12.5 mg by mouth daily                                hydrocortisone 2.5 % cream   Apply topically 2 times daily                                insulin glargine 100 UNIT/ML injection   Commonly known as:  LANTUS SOLOSTAR   Inject 40 Units Subcutaneous At Bedtime                                insulin pen needle 32G X 4 MM   Use 1 pen needles daily                                LAC-HYDRIN 12 % cream   Apply topically 2 times daily   Generic drug:  ammonium lactate                                LISINOPRIL PO   Take 40 mg by mouth daily                                PRIMIDONE PO   Take 50 mg by mouth At Bedtime                                SIMVASTATIN PO   Take 20 mg by mouth At Bedtime                                triamcinolone 0.1 % cream   Commonly known as:  KENALOG   Apply topically 2 times daily                                TYLENOL PO                                VITAMIN D-3 PO   Take 2,000 Units by mouth daily

## 2018-11-01 ENCOUNTER — TELEPHONE (OUTPATIENT)
Dept: EDUCATION SERVICES | Facility: HOSPITAL | Age: 53
End: 2018-11-01

## 2018-11-01 NOTE — TELEPHONE ENCOUNTER
ANKIT: Pt calls she has 2 old Rx's for her Lantus with 10 units per day and 20 units per day directions. She now got a new Rx with directions for 40 units per day. She is unsure what to do. I verified that all 3 of the vials/pens are the same strength (100units/ml), and instructed her she can use the old medication as well as the new but she needs to use the current directions of 40 units per day. Pt had a BG this am of 136, she then told me it was 257 and that she likes to write a better number to make it look better. I advised she write the correct number to help get her BG under better control. She has a Follow-up appt on 11/5/18 to discuss.

## 2018-11-05 ENCOUNTER — HOSPITAL ENCOUNTER (OUTPATIENT)
Dept: EDUCATION SERVICES | Facility: HOSPITAL | Age: 53
Discharge: HOME | End: 2018-11-05
Attending: NURSE PRACTITIONER | Admitting: NURSE PRACTITIONER
Payer: MEDICARE

## 2018-11-05 VITALS
HEART RATE: 99 BPM | BODY MASS INDEX: 39.9 KG/M2 | HEIGHT: 64 IN | WEIGHT: 233.7 LBS | DIASTOLIC BLOOD PRESSURE: 92 MMHG | OXYGEN SATURATION: 90 % | RESPIRATION RATE: 16 BRPM | SYSTOLIC BLOOD PRESSURE: 145 MMHG

## 2018-11-05 PROCEDURE — 95250 CONT GLUC MNTR PHYS/QHP EQP: CPT

## 2018-11-05 PROCEDURE — G0108 DIAB MANAGE TRN  PER INDIV: HCPCS | Mod: GZ

## 2018-11-05 ASSESSMENT — PAIN SCALES - GENERAL: PAINLEVEL: NO PAIN (0)

## 2018-11-05 NOTE — LETTER
2018        RE: Marly Cannon   [address]  [place] [address]        Marly is here for mid-study download of LibrePro and BG review.    She is taking Lantus 40 units daily and Glipizide 10 mg BID.    Reviewed BG readings and CGM report. Readings range as follows: fastin-281 and post-supper: 291, 490.    LibrePro report for the last 6 days reflects:  Time in Range  Above 180 - 100%  - average glucose for the 6 days is 295.    Marly eats breakfast and supper, but there is a distinct rise midday when Marly drinks a regular pop. Asked Marly to drink less pop.    Starting meal time insulin today.    Instructed on insulin action, dosage, injection technique, site rotation, sharps disposal, insulin storage, hypoglycemia symptoms and treatment and when to call. Pt appropriately demonstrated proper injection technique with minimal  cueing.     Instructions:  In the morning:  - Test Blood Sugar when you wake up (before eating or drinking anything)  - Inject NovoLOG (orange pen) 4 units right before you eat breakfast    Before supper:  - Test Blood Sugar  - Inject Lantus (gray pen) 42 units  - Inject NovoLOG (orange pen) 4 units right before you eat supper    At bedtime:  - Test Blood Sugar      Return on Wednesday on 18 at 3:00 pm for CGM Evaluation - please bring your food log    Call Kat with any concerns or if you have lows - [phone]    I will call you later this week to see how you are doing    Time spent with patient 30 minutes.      Sincerely,        Kat Dawn RN

## 2018-11-05 NOTE — PROGRESS NOTES
Marly is here for mid-study download of LibrePro and BG review.    She is taking Lantus 40 units daily and Glipizide 10 mg BID.    Reviewed BG readings and CGM report. Readings range as follows: fastin-281 and post-supper: 291, 490.    LibrePro report for the last 6 days reflects:  Time in Range  Above 180 - 100%  - average glucose for the 6 days is 295.    Marly eats breakfast and supper, but there is a distinct rise midday when Marly drinks a regular pop. Asked Marly to drink less pop.    Starting meal time insulin today.    Instructed on insulin action, dosage, injection technique, site rotation, sharps disposal, insulin storage, hypoglycemia symptoms and treatment and when to call. Pt appropriately demonstrated proper injection technique with minimal  cueing.     Instructions:  In the morning:  - Test Blood Sugar when you wake up (before eating or drinking anything)  - Inject NovoLOG (orange pen) 4 units right before you eat breakfast    Before supper:  - Test Blood Sugar  - Inject Lantus (gray pen) 42 units  - Inject NovoLOG (orange pen) 4 units right before you eat supper    At bedtime:  - Test Blood Sugar      Return on Wednesday on 18 at 3:00 pm for CGM Evaluation - please bring your food log    Call Kat with any concerns or if you have lows - [phone]    I will call you later this week to see how you are doing    Time spent with patient 30 minutes.

## 2018-11-05 NOTE — IP AVS SNAPSHOT
MRN:[id number]                      After Visit Summary   11/5/2018    Marly Cannon    MRN: [id number]           Thank you!     Thank you for choosing [facility] for your care. Our goal is always to provide you with excellent care. Hearing back from our patients is one way we can continue to improve our services. Please take a few minutes to complete the written survey that you may receive in the mail after you visit with us. Thank you!        Patient Information     Date Of Birth          1965        About your hospital stay     You were admitted on:  November 5, 2018 You last received care in the:  [facility]    You were discharged on:  November 5, 2018       Who to Call     For medical emergencies, please call 911.  For non-urgent questions about your medical care, please call your primary care provider or clinic, [phone]          Attending Provider     Provider Specialty    Sera Casanova APRN CNP Family Practice       Primary Care Provider Office Phone # Fax #    Amberly ELISE Whyte [phone]      Your next 10 appointments already scheduled     Nov 14, 2018  3:20 PM CST   (Arrive by 3:05 PM)   Office Visit with BLAIR Graff CNP   [facility] [place] ([facility] [place] )    [address]  [place] [address]   [phone]           Bring a current list of meds and any records pertaining to this visit. For Physicals, please bring immunization records and any forms needing to be filled out. Please arrive 10 minutes early to complete paperwork.              Care Instructions    In the morning:  - Test Blood Sugar when you wake up (before eating or drinking anything)  - Inject NovoLOG (orange pen) 4 units right before you eat breakfast    Before supper:  - Test Blood Sugar  - Inject Lantus (gray pen) 42 units  - Inject NovoLOG (orange pen) 4 units right before you eat supper    At bedtime:  - Test Blood  "Sugar      Return on Wednesday on 11/14/18 at 3:00 pm for CGM Evaluation - please bring your food log    Call Kat with any concerns or if you have lows - [phone]    I will call you later this week to see how you are doing         Pending Results     No orders found from 11/3/2018 to 11/6/2018.            Admission Information     Date & Time Provider Department Dept. Phone    11/5/2018 Sera Casanova APRN CNP [facility] [phone]      Your Vitals Were     Blood Pressure Pulse Respirations Height Weight Pulse Oximetry    145/92 99 16 1.626 m (5' 4\") 106 kg (233 lb 11.2 oz) 90%    BMI (Body Mass Index)                   40.11 kg/m2           Care EveryWhere ID     This is your Care EveryWhere ID. This could be used by other organizations to access your [facility] medical records  [id number]        Equal Access to Services     [facility] : Hadii melody Damon, wajoaquín baptiste, qaybemma kaalmakya martinez, chandra caceres . So [facility] [phone].    ATENCIÓN: Si habla español, tiene a crook disposición servicios gratuitos de asistencia lingüística. Kaykya al [phone].    We comply with applicable federal civil rights laws and [place] laws. We do not discriminate on the basis of race, color, national origin, age, disability, sex, sexual orientation, or gender identity.               Review of your medicines      UNREVIEWED medicines. Ask your doctor about these medicines        Dose / Directions    AMLODIPINE BESYLATE PO        Dose:  2.5 mg   Take 2.5 mg by mouth daily   Refills:  0       ASPIRIN EC PO        Dose:  81 mg   Take 81 mg by mouth daily   Refills:  0       COMBIVENT RESPIMAT  MCG/ACT inhaler   Generic drug:  Ipratropium-Albuterol        Dose:  1 puff   Inhale 1 puff into the lungs 4 times daily   Refills:  0       DEPO-PROVERA IM        Refills:  0       FLEXERIL PO        Dose:  10 mg   Take 10 mg by mouth 3 times daily as needed for " muscle spasms   Refills:  0       GLIPIZIDE PO        Dose:  10 mg   Take 10 mg by mouth 2 times daily (before meals)   Refills:  0       HYDROCHLOROTHIAZIDE PO        Dose:  12.5 mg   Take 12.5 mg by mouth daily   Refills:  0       hydrocortisone 2.5 % cream        Apply topically 2 times daily   Refills:  0       insulin glargine 100 UNIT/ML injection   Commonly known as:  LANTUS SOLOSTAR   Used for:  Type 2 diabetes mellitus with hyperglycemia, with long-term current use of insulin (H)        Dose:  40 Units   Inject 40 Units Subcutaneous At Bedtime   Quantity:  15 mL   Refills:  3       LAC-HYDRIN 12 % cream   Generic drug:  ammonium lactate        Apply topically 2 times daily   Refills:  0       LISINOPRIL PO        Dose:  40 mg   Take 40 mg by mouth daily   Refills:  0       PRIMIDONE PO        Dose:  50 mg   Take 50 mg by mouth At Bedtime   Refills:  0       SIMVASTATIN PO        Dose:  20 mg   Take 20 mg by mouth At Bedtime   Refills:  0       triamcinolone 0.1 % cream   Commonly known as:  KENALOG        Apply topically 2 times daily   Refills:  0       TYLENOL PO        Refills:  0       VITAMIN D-3 PO        Dose:  2000 Units   Take 2,000 Units by mouth daily   Refills:  0         CONTINUE these medicines which have NOT CHANGED        Dose / Directions    insulin pen needle 32G X 4 MM   Used for:  Type 2 diabetes mellitus with hyperglycemia, without long-term current use of insulin (H)        Use 1 pen needles daily   Quantity:  100 each   Refills:  10                Protect others around you: Learn how to safely use, store and throw away your medicines at www.disposemymeds.org.             Medication List: This is a list of all your medications and when to take them. Check marks below indicate your daily home schedule. Keep this list as a reference.      Medications           Morning Afternoon Evening Bedtime As Needed    AMLODIPINE BESYLATE PO   Take 2.5 mg by mouth daily                                 ASPIRIN EC PO   Take 81 mg by mouth daily                                COMBIVENT RESPIMAT  MCG/ACT inhaler   Inhale 1 puff into the lungs 4 times daily   Generic drug:  Ipratropium-Albuterol                                DEPO-PROVERA IM                                FLEXERIL PO   Take 10 mg by mouth 3 times daily as needed for muscle spasms                                GLIPIZIDE PO   Take 10 mg by mouth 2 times daily (before meals)                                HYDROCHLOROTHIAZIDE PO   Take 12.5 mg by mouth daily                                hydrocortisone 2.5 % cream   Apply topically 2 times daily                                insulin glargine 100 UNIT/ML injection   Commonly known as:  LANTUS SOLOSTAR   Inject 40 Units Subcutaneous At Bedtime                                insulin pen needle 32G X 4 MM   Use 1 pen needles daily                                LAC-HYDRIN 12 % cream   Apply topically 2 times daily   Generic drug:  ammonium lactate                                LISINOPRIL PO   Take 40 mg by mouth daily                                PRIMIDONE PO   Take 50 mg by mouth At Bedtime                                SIMVASTATIN PO   Take 20 mg by mouth At Bedtime                                triamcinolone 0.1 % cream   Commonly known as:  KENALOG   Apply topically 2 times daily                                TYLENOL PO                                VITAMIN D-3 PO   Take 2,000 Units by mouth daily

## 2018-11-09 NOTE — TELEPHONE ENCOUNTER
I called the pt she states she needs a refill on her Novolog and her test strips. The pharmacy sent her her Lantus instead of the Novolog she needed. I do not see Novolog on her med list, but last visit states 4 units before breakfast and supper. Please verify.

## 2018-11-09 NOTE — TELEPHONE ENCOUNTER
Pt calls the pharmacy delivered the wrong insulin. I called the pt and left a message to call back.

## 2018-11-13 NOTE — PROGRESS NOTES
SUBJECTIVE:   Marly Cannon is a 53 year old female who presents to clinic today for the following health issues:      Diabetes Follow-up    Patient is checking blood sugars: three times daily.   Blood sugar testing frequency justification: Uncontrolled diabetes and Adjustment of medication(s)  Results are as follows:         am - 137-253         suppertime - varies from 246-357         bedtime - 178-417    Diabetic concerns: None     Symptoms of hypoglycemia (low blood sugar): none     Paresthesias (numbness or burning in feet) or sores: No     Date of last diabetic eye exam: 18    Period Average (mg/dl): 250  Highest Value (mg/dl): 417  Lowest Value (mg/dl):137  Values Above Goal (180 mg/dl): 20  Values Within Goal ( mg/dl): 5  Values Below Goal (70 mg/dl): 0    Findings:  Ave. S      Date: 2018   Average Glucose: 318   Time in target: 0  Time below:0  Time above: 100%  Date: 2018   Average Glucose: 294   Time in target: 0  Time below:0  Time above: 100%  Date: 11/3/2018   Average Glucose: 288   Time in target: 0  Time below:0  Time above: 100%  Date: 2018   Average Glucose: 301   Time in target: 0  Time below:0  Time above: 100%  Date: 2018   Average Glucose: 294   Time in target: 0  Time below:0  Time above: 100%   Date: 2018   Average Glucose: 285   Time in target: 5%  Time below:0  Time above: 95%(started meal time insulin)  Date: 2018   Average Glucose: 262   Time in target: 29%  Time below:2%  Time above: 69%  Date: 2018   Average Glucose: 255   Time in target: 24%  Time below:9%  Time above: 67%  Date: 2018   Average Glucose: 282   Time in target: 0  Time below:0  Time above: 100%  Date: 11/10/2018   Average Glucose: 253   Time in target: 12%  Time below:0  Time above: 88%  Date: 2018   Average Glucose: 263   Time in target: 0  Time below:0  Time above: 100%  Date: 2018   Average Glucose: 243   Time in target: 11%  Time below:0  Time  above: 89%  Date: 11/13/2018   Average Glucose: 217   Time in target: 44%  Time below:0  Time above: 56%    Distribution Data:  Percentage above 180: 87%  Percentage within : 12%  Percentage below 70: 1%    Hypoglycemia incidence:  Potential overnight low 1 night. Marly denies any symptoms.  She did have a spike like she ate something when she was low. But she does not recall eating during the night at all.     Food choices/Carbohydrate counting:  High carb foods, eats 2 times per day, will drink either a diet pop or regular pop most days     Exercise:  limited    Recommendations:  -increase Lantus to 45 units daily  -increase Novolog 5 units before each meals  -if you drink regular pop you should give yourself 2 units of Novolog    Diabetes Medications:  1. Glipizide 10 mg 2 times a day  2. Lantus 42 units daily  3. Novolog 4 units with meals  ASA use: yes, daily aspirn  Statin use: yes, simvastatin      BP Readings from Last 2 Encounters:   11/14/18 110/78   11/05/18 145/92     Hemoglobin A1C (%)   Date Value   08/21/2018 9.6 (A)   08/21/2018 9.6 (H)     LDL Cholesterol Calculated (mg/dL)   Date Value   04/29/2016 109     LDL Cholesterol Direct (mg/dL)   Date Value   04/03/2015 79       Diabetes Management Resources    Amount of exercise or physical activity: None    Problems taking medications regularly: No    Medication side effects: none    Diet: diabetic            Problem list and histories reviewed & adjusted, as indicated.  Additional history: as documented    Patient Active Problem List   Diagnosis     Type 2 diabetes, HbA1c goal < 7% (H)     ACP (advance care planning)     Stage 3 chronic kidney disease (H)     Pulmonary emphysema (H)     Hyperparathyroidism due to renal insufficiency (H)     Morbid obesity (H)     Vitamin D deficiency     Intellectual disability     Breast fibrocystic disorder     Tobacco abuse     Microalbuminuria due to type 2 diabetes mellitus (H)     Past Surgical History:    Procedure Laterality Date     COLONOSCOPY N/A 8/20/2015    Procedure: COLONOSCOPY;  Surgeon: Gentry Porter MD;  Location: [facility]     COLONOSCOPY N/A 9/21/2018    Procedure: COLONOSCOPY;  COLONOSCOPY WITH POLYPECTOMY;  Surgeon: Gentry Porter MD;  Location: [facility]       Social History   Substance Use Topics     Smoking status: Current Every Day Smoker     Packs/day: 1.00     Years: 34.00     Types: Cigarettes     Start date: 1/1/1979     Smokeless tobacco: Never Used     Alcohol use No     Family History   Problem Relation Age of Onset     Diabetes Mother      Diabetes Father      Hypertension Brother      Diabetes Sister          Current Outpatient Prescriptions   Medication Sig Dispense Refill     Acetaminophen (TYLENOL PO) Take 325 mg by mouth every 6 hours as needed        AMLODIPINE BESYLATE PO Take 2.5 mg by mouth daily       ammonium lactate (LAC-HYDRIN) 12 % cream Apply topically 2 times daily       ASPIRIN EC PO Take 81 mg by mouth daily       blood glucose monitoring (NO BRAND SPECIFIED) test strip Use to test blood sugar 3 times daily or as directed. 300 strip 3     Cholecalciferol (VITAMIN D-3 PO) Take 2,000 Units by mouth daily       Cyclobenzaprine HCl (FLEXERIL PO) Take 10 mg by mouth 3 times daily as needed for muscle spasms       HYDROCHLOROTHIAZIDE PO Take 12.5 mg by mouth daily        hydrocortisone 2.5 % cream Apply topically 2 times daily       insulin aspart (NOVOLOG PEN) 100 UNIT/ML injection 4 units before breakfast and supper 15 mL 3     insulin glargine (LANTUS SOLOSTAR) 100 UNIT/ML pen Inject 40 Units Subcutaneous At Bedtime 15 mL 3     insulin pen needle 32G X 4 MM Use 1 pen needles daily 100 each 10     Ipratropium-Albuterol (COMBIVENT RESPIMAT)  MCG/ACT inhaler Inhale 1 puff into the lungs 4 times daily       LISINOPRIL PO Take 40 mg by mouth daily       PRIMIDONE PO Take 50 mg by mouth At Bedtime       SIMVASTATIN PO Take 20 mg by mouth At Bedtime        triamcinolone  (KENALOG) 0.1 % cream Apply topically 2 times daily       No Known Allergies    Reviewed and updated as needed this visit by clinical staff  Tobacco  Allergies  Meds  Med Hx  Surg Hx  Fam Hx  Soc Hx      Reviewed and updated as needed this visit by Provider         ROS:  CONSTITUTIONAL: NEGATIVE for fever, chills, change in weight  INTEGUMENTARY/SKIN: NEGATIVE for worrisome rashes, moles or lesions, follows with podiatry for her feet  EYES: NEGATIVE for vision changes or irritation  ENT/MOUTH: NEGATIVE for ear, mouth and throat problems  RESP:hx of emphysema  CV: NEGATIVE for chest pain, palpitations or peripheral edema  GI: NEGATIVE for nausea, abdominal pain, heartburn, or change in bowel habits  : denies dysuria  MUSCULOSKELETAL: NEGATIVE for significant arthralgias or myalgia  NEURO: NEGATIVE for weakness, dizziness or paresthesias  ENDOCRINE: Hx diabetes  PSYCHIATRIC: NEGATIVE for changes in mood or affect    OBJECTIVE:     /78 (BP Location: Left arm, Patient Position: Sitting, Cuff Size: Adult Large)  Pulse 96  Temp 98.3  F (36.8  C) (Tympanic)  Resp 24  Wt 235 lb (106.6 kg)  SpO2 91%  BMI 40.34 kg/m2  Body mass index is 40.34 kg/(m^2).   GENERAL: alert and no distress  NECK: no adenopathy, no asymmetry, masses, or scars and thyroid normal to palpation  RESP: decreased throughout  CV: regular rate and rhythm, normal S1 S2, no S3 or S4, no murmur, click or rub, no peripheral edema and peripheral pulses strong  ABDOMEN: soft, nontender, no hepatosplenomegaly, no masses and bowel sounds normal  Diabetic foot exam: completed 2018 with PCP at [facility]    Diagnostic Test Results:  Results for orders placed or performed in visit on 18   GLUCOSE MONITOR, 72 HOUR, PHYS INTERP    Narrative    Findings:  Ave. S      Date: 2018   Average Glucose: 318   Time in target: 0  Time below:0  Time above: 100%  Date: 2018   Average Glucose: 294   Time in target: 0  Time below:0  Time  above: 100%  Date: 11/3/2018   Average Glucose: 288   Time in target: 0  Time below:0  Time above: 100%  Date: 11/4/2018   Average Glucose: 301   Time in target: 0  Time below:0  Time above: 100%  Date: 11/5/2018   Average Glucose: 294   Time in target: 0  Time below:0  Time above: 100%   Date: 11/6/2018   Average Glucose: 285   Time in target: 5%  Time below:0  Time above: 95%(started meal time insulin)  Date: 11/7/2018   Average Glucose: 262   Time in target: 29%  Time below:2%  Time above: 69%  Date: 11/8/2018   Average Glucose: 255   Time in target: 24%  Time below:9%  Time above: 67%  Date: 11/9/2018   Average Glucose: 282   Time in target: 0  Time below:0  Time above: 100%  Date: 11/10/2018   Average Glucose: 253   Time in target: 12%  Time below:0  Time above: 88%  Date: 11/11/2018   Average Glucose: 263   Time in target: 0  Time below:0  Time above: 100%  Date: 11/12/2018   Average Glucose: 243   Time in target: 11%  Time below:0  Time above: 89%  Date: 11/13/2018   Average Glucose: 217   Time in target: 44%  Time below:0  Time above: 56%    Distribution Data:  Percentage above 180: 87%  Percentage within : 12%  Percentage below 70: 1%    Hypoglycemia incidence:  Potential overnight low 1 night. Marly denies any symptoms.  She did have a spike like she ate something when she was low. But she does not recall eating during the night at all.     Food choices/Carbohydrate counting:  High carb foods, eats 2 times per day, will drink either a diet pop or regular pop most days     Exercise:  limited    Recommendations:  -increase Lantus to 45 units daily  -increase Novolog 5 units before each meals  -if you drink regular pop you should give yourself 2 units of Novolog         ASSESSMENT/PLAN:     1. Type 2 diabetes mellitus with hyperglycemia, with long-term current use of insulin (H)  -increase Lantus to 45 units daily  -increase Novolog 5 units before each meals  -if you drink regular pop you should give  yourself 2 units of Novolog  -follow up with TAHIR (Kat LUCIO) in a month  -call with any questions or low BGs  - GLUCOSE MONITOR, 72 HOUR, PHYS INTERP        Patient Instructions   Continue working on healthy eating and moving (start low and slow, work up to 30 min, 5x/week)    BG goals:  Fasting and before meals <130, >80  2 hour after eating <180    We only need 1/2 of these numbers to be within target then your A1c will be within target    Medication changes   -increase Lantus to 45 units daily  -increase Novolog 5 units before each meals  -if you drink regular pop you should give yourself 2 units of Novolog    Follow up   - with Kat 1 month    Call me sooner if any problems/concerns and/or questions develop including consistent low BGs <70 or consistent high BGs >200  [phone] (Unit Coordinator)    [phone] (Nurse)      BLAIR Solorzano CNP  [facility] - [facility]

## 2018-11-14 ENCOUNTER — OFFICE VISIT (OUTPATIENT)
Dept: FAMILY MEDICINE | Facility: OTHER | Age: 53
End: 2018-11-14
Attending: NURSE PRACTITIONER
Payer: MEDICARE

## 2018-11-14 VITALS
OXYGEN SATURATION: 91 % | WEIGHT: 235 LBS | TEMPERATURE: 98.3 F | SYSTOLIC BLOOD PRESSURE: 110 MMHG | RESPIRATION RATE: 24 BRPM | DIASTOLIC BLOOD PRESSURE: 78 MMHG | BODY MASS INDEX: 40.34 KG/M2 | HEART RATE: 96 BPM

## 2018-11-14 DIAGNOSIS — E11.65 TYPE 2 DIABETES MELLITUS WITH HYPERGLYCEMIA, WITH LONG-TERM CURRENT USE OF INSULIN (H): Primary | ICD-10-CM

## 2018-11-14 DIAGNOSIS — Z79.4 TYPE 2 DIABETES MELLITUS WITH HYPERGLYCEMIA, WITH LONG-TERM CURRENT USE OF INSULIN (H): Primary | ICD-10-CM

## 2018-11-14 PROCEDURE — G0463 HOSPITAL OUTPT CLINIC VISIT: HCPCS

## 2018-11-14 PROCEDURE — 99214 OFFICE O/P EST MOD 30 MIN: CPT | Performed by: NURSE PRACTITIONER

## 2018-11-14 PROCEDURE — 95251 CONT GLUC MNTR ANALYSIS I&R: CPT | Performed by: NURSE PRACTITIONER

## 2018-11-14 ASSESSMENT — PAIN SCALES - GENERAL: PAINLEVEL: NO PAIN (0)

## 2018-11-14 NOTE — NURSING NOTE
"Chief Complaint   Patient presents with     Diabetes     CGM       Initial /78 (BP Location: Left arm, Patient Position: Sitting, Cuff Size: Adult Large)  Pulse 96  Temp 98.3  F (36.8  C) (Tympanic)  Resp 24  Wt 235 lb (106.6 kg)  SpO2 91%  BMI 40.34 kg/m2 Estimated body mass index is 40.34 kg/(m^2) as calculated from the following:    Height as of 11/5/18: 5' 4\" (1.626 m).    Weight as of this encounter: 235 lb (106.6 kg).  Medication Reconciliation: complete    Naomie Howell LPN  "

## 2018-11-14 NOTE — PATIENT INSTRUCTIONS
Continue working on healthy eating and moving (start low and slow, work up to 30 min, 5x/week)    BG goals:  Fasting and before meals <130, >80  2 hour after eating <180    We only need 1/2 of these numbers to be within target then your A1c will be within target    Medication changes   -increase Lantus to 45 units daily  -increase Novolog 5 units before each meals  -if you drink regular pop you should give yourself 2 units of Novolog    Follow up   - with Kat 1 month    Call me sooner if any problems/concerns and/or questions develop including consistent low BGs <70 or consistent high BGs >200  [phone] (Unit Coordinator)    [phone] (Nurse)

## 2018-11-14 NOTE — MR AVS SNAPSHOT
After Visit Summary   11/14/2018    Marly Cannon    MRN: [id number]           Patient Information     Date Of Birth          1965        Visit Information        Provider Department      11/14/2018 3:20 PM Sera Casanova APRN CNP [facility]        Care Instructions    Continue working on healthy eating and moving (start low and slow, work up to 30 min, 5x/week)    BG goals:  Fasting and before meals <130, >80  2 hour after eating <180    We only need 1/2 of these numbers to be within target then your A1c will be within target    Medication changes   -increase Lantus to 45 units daily  -increase Novolog 5 units before each meals  -if you drink regular pop you should give yourself 2 units of Novolog    Follow up   - with Kat 1 month    Call me sooner if any problems/concerns and/or questions develop including consistent low BGs <70 or consistent high BGs >200  [phone] (Unit Coordinator)    [phone] (Nurse)          Follow-ups after your visit        Follow-up notes from your care team     Return in about 4 weeks (around 12/12/2018).      Your next 10 appointments already scheduled     Dec 12, 2018 11:00 AM CST   (Arrive by 10:45 AM)   Diabetes Education with Kat Dawn RN   [facility] ([facility] )    [address] [place] [id number] [phone]              Who to contact     If you have questions or need follow up information about today's clinic visit or your schedule please contact [facility] directly at [phone].  Normal or non-critical lab and imaging results will be communicated to you by MyChart, letter or phone within 4 business days after the clinic has received the results. If you do not hear from us within 7 days, please contact the clinic through MyChart or phone. If you have a critical or abnormal lab result, we will notify you by phone as soon as possible.  Submit refill  requests through [employer] or call your pharmacy and they will forward the refill request to us. Please allow 3 business days for your refill to be completed.          Additional Information About Your Visit        Care EveryWhere ID     This is your Care EveryWhere ID. This could be used by other organizations to access your [facility] records  [id number]        Your Vitals Were     Pulse Temperature Respirations Pulse Oximetry BMI (Body Mass Index)       96 98.3  F (36.8  C) (Tympanic) 24 91% 40.34 kg/m2        Blood Pressure from Last 3 Encounters:   11/14/18 110/78   11/05/18 145/92   10/31/18 148/93    Weight from Last 3 Encounters:   11/14/18 106.6 kg (235 lb)   11/05/18 106 kg (233 lb 11.2 oz)   10/31/18 107.1 kg (236 lb 1.6 oz)              Today, you had the following     No orders found for display         Today's Medication Changes          These changes are accurate as of 11/14/18  4:31 PM.  If you have any questions, ask your nurse or doctor.               These medicines have changed or have updated prescriptions.        Dose/Directions    GLIPIZIDE PO   This may have changed:  Another medication with the same name was removed. Continue taking this medication, and follow the directions you see here.   Changed by:  Sera Casanova APRN CNP        Dose:  10 mg   Take 10 mg by mouth 2 times daily   Refills:  0                Primary Care Provider Office Phone # Fax #    Amberly OLIVARES GRISEL Whyte [phone]       [facility] [address] [facility] [address]        Equal Access to Services     [facility] AH: Hadii aad ku hadasho Sodanieali, waaxda luqadaha, qaybta kaalmada adeegyada, chandra rico. So [facility] [phone].    ATENCIÓN: Si habla español, tiene a crook disposición servicios gratuitos de asistencia lingüística. Llame al [phone].    We comply with applicable federal civil rights laws and [place] laws. We do not discriminate on the basis of  race, color, national origin, age, disability, sex, sexual orientation, or gender identity.            Thank you!     Thank you for choosing [facility]  for your care. Our goal is always to provide you with excellent care. Hearing back from our patients is one way we can continue to improve our services. Please take a few minutes to complete the written survey that you may receive in the mail after your visit with us. Thank you!             Your Updated Medication List - Protect others around you: Learn how to safely use, store and throw away your medicines at www.disposemymeds.org.          This list is accurate as of 11/14/18  4:31 PM.  Always use your most recent med list.                   Brand Name Dispense Instructions for use Diagnosis    AMLODIPINE BESYLATE PO      Take 2.5 mg by mouth daily        ASPIRIN EC PO      Take 81 mg by mouth daily        blood glucose monitoring test strip    no brand specified    300 strip    Use to test blood sugar 3 times daily or as directed.    IDDM (insulin dependent diabetes mellitus) (H)       COMBIVENT RESPIMAT  MCG/ACT inhaler   Generic drug:  Ipratropium-Albuterol      Inhale 1 puff into the lungs 4 times daily        FLEXERIL PO      Take 10 mg by mouth 3 times daily as needed for muscle spasms        GLIPIZIDE PO      Take 10 mg by mouth 2 times daily        HYDROCHLOROTHIAZIDE PO      Take 12.5 mg by mouth daily        hydrocortisone 2.5 % cream      Apply topically 2 times daily        insulin aspart 100 UNIT/ML injection    NovoLOG PEN    15 mL    4 units before breakfast and supper    IDDM (insulin dependent diabetes mellitus) (H)       insulin glargine 100 UNIT/ML injection    LANTUS SOLOSTAR    15 mL    Inject 40 Units Subcutaneous At Bedtime    Type 2 diabetes mellitus with hyperglycemia, with long-term current use of insulin (H)       insulin pen needle 32G X 4 MM     100 each    Use 1 pen needles daily    Type 2 diabetes  mellitus with hyperglycemia, without long-term current use of insulin (H)       LAC-HYDRIN 12 % cream   Generic drug:  ammonium lactate      Apply topically 2 times daily        LISINOPRIL PO      Take 40 mg by mouth daily        PRIMIDONE PO      Take 50 mg by mouth At Bedtime        SIMVASTATIN PO      Take 20 mg by mouth At Bedtime        triamcinolone 0.1 % cream    KENALOG     Apply topically 2 times daily        TYLENOL PO      Take 325 mg by mouth every 6 hours as needed        VITAMIN D-3 PO      Take 2,000 Units by mouth daily

## 2018-11-20 ENCOUNTER — PATIENT OUTREACH (OUTPATIENT)
Dept: EDUCATION SERVICES | Facility: HOSPITAL | Age: 53
End: 2018-11-20

## 2018-11-20 NOTE — PROGRESS NOTES
Marly called as she is concerned that BG right now before supper is 370. Had Marly re-check and it is still 341.    Marly is taking Lantus 45 units daily and NovoLOG 4 units before meals.    She reports readings as follows since Friday as follows:    Fasting Pre-supper Post-supper  Friday  227  135  136  Saturday 150  151  129  Sunday 224  165  128   Monday 210  135  135  Tuesday 225  370      341    Instructed Marly to take NovoLOG 6 units before supper tonight - and only tonight. Marly understands that she is to go back to 4 units before meals. Marly sometimes skips lunch - I discussed with her that when she eats lunch, she should take NovoLOG 4 units before lunch.    Marly has an appointment tomorrow with Amberly Whyte NP. I will follow by phone

## 2018-11-21 ENCOUNTER — TRANSFERRED RECORDS (OUTPATIENT)
Dept: HEALTH INFORMATION MANAGEMENT | Facility: CLINIC | Age: 53
End: 2018-11-21

## 2018-11-21 ENCOUNTER — TELEPHONE (OUTPATIENT)
Dept: EDUCATION SERVICES | Facility: HOSPITAL | Age: 53
End: 2018-11-21

## 2018-11-21 LAB
CREATININE RANDOM URINE: 233 MG/DL
HBA1C MFR BLD: 10.9 % (ref 4–5.6)
MICROALBUMIN URINE (EXTERNAL): 32.9 MG/DL
MICROALBUMIN/CR RATIO URINE: 141

## 2018-11-21 NOTE — TELEPHONE ENCOUNTER
Amberly Whyte from [facility] calls she saw the pt today and wanted to let you know that the pt's A!C was 10.9. The pt has had a 15 lb weight gain in the last 3 mo. The pt used to live in assisted living and moved out one year ago. The pt is confused about her insulin doses. Amberly did not make any med changes today. The pt has a Follow-up appt in [facility] on 12/12/18.

## 2018-12-07 ENCOUNTER — PATIENT OUTREACH (OUTPATIENT)
Dept: EDUCATION SERVICES | Facility: HOSPITAL | Age: 53
End: 2018-12-07

## 2018-12-07 NOTE — PROGRESS NOTES
Called Marly. She states that she is doing well with BG readings, although they are still up and down. States BG this AM was 141.    She continues to take her Lantus 45 units daily and NovoLOG 5 units before meals.      Will follow at next appointment on 12/13/18.

## 2018-12-12 ENCOUNTER — HOSPITAL ENCOUNTER (OUTPATIENT)
Dept: EDUCATION SERVICES | Facility: HOSPITAL | Age: 53
Discharge: HOME | End: 2018-12-12
Attending: NURSE PRACTITIONER | Admitting: NURSE PRACTITIONER
Payer: MEDICARE

## 2018-12-12 VITALS
HEIGHT: 64 IN | SYSTOLIC BLOOD PRESSURE: 122 MMHG | RESPIRATION RATE: 16 BRPM | DIASTOLIC BLOOD PRESSURE: 80 MMHG | BODY MASS INDEX: 40.43 KG/M2 | OXYGEN SATURATION: 93 % | WEIGHT: 236.8 LBS | HEART RATE: 94 BPM

## 2018-12-12 DIAGNOSIS — Z79.4 TYPE 2 DIABETES MELLITUS WITH HYPERGLYCEMIA, WITH LONG-TERM CURRENT USE OF INSULIN (H): Primary | ICD-10-CM

## 2018-12-12 DIAGNOSIS — E11.65 TYPE 2 DIABETES MELLITUS WITH HYPERGLYCEMIA, WITH LONG-TERM CURRENT USE OF INSULIN (H): Primary | ICD-10-CM

## 2018-12-12 PROCEDURE — G0108 DIAB MANAGE TRN  PER INDIV: HCPCS

## 2018-12-12 ASSESSMENT — MIFFLIN-ST. JEOR: SCORE: 1654.12

## 2018-12-12 ASSESSMENT — PAIN SCALES - GENERAL: PAINLEVEL: NO PAIN (0)

## 2018-12-12 NOTE — PATIENT INSTRUCTIONS
Increase Lantus to 50 units daily    Increase NovoLOG to 10 units plus correction before breakfast and supper as follows:    Before Breakfast and Supper:    BG   Units of NovoLOG  Less than 70  None     10 units  150-200  11 units  201-250  12 units  251-300     13 units  301-350  14 units   Greater than 350 15 units    Return to Diabetes Ed in 2 weeks on 12/26/18 at 1245 pm

## 2018-12-16 NOTE — PROGRESS NOTES
"Diabetes Self-Management Education & Support    Diabetes Education Self Management & Training    SUBJECTIVE/OBJECTIVE:  Presents for: Follow-up  Accompanied by: Self  Diabetes education in the past 24mo: Yes  Focus of Visit: Monitoring, Taking Medication  Insulin Type: Lantus, NovoLog  Diabetes type: Type 2  Disease course: Stable  Diabetes management related comments/concerns: BG readings remain elevated  Transportation concerns: No  Other concerns:: Glasses, Cognitive impairment  Cultural Influences/Ethnic Background:  American    Diabetes Symptoms & Complications  Blurred vision: No  Fatigue: No  Neuropathy: No  Foot ulcerations: No  Polydipsia: No  Polyphagia: No  Polyuria: No  Visual change: No  Weakness: No  Weight loss: No  Slow healing wounds: No  Recent Infection(s): No  Symptom course: Stable  Weight trend: Increasing steadily  Autonomic neuropathy: No  CVA: No  Heart disease: No  Nephropathy: Yes  Peripheral neuropathy: No  Peripheral Vascular Disease: No  Retinopathy: No  Sexual dysfunction: No    Patient Problem List and Family Medical History reviewed for relevant medical history, current medical status, and diabetes risk factors.    Vitals:  /80   Pulse 94   Resp 16   Ht 1.626 m (5' 4\")   Wt 107.4 kg (236 lb 12.8 oz)   SpO2 93%   BMI 40.65 kg/m    Estimated body mass index is 40.65 kg/m  as calculated from the following:    Height as of this encounter: 1.626 m (5' 4\").    Weight as of this encounter: 107.4 kg (236 lb 12.8 oz).   Last 3 BP:   BP Readings from Last 3 Encounters:   12/12/18 122/80   11/14/18 110/78   11/05/18 145/92       History   Smoking Status     Current Every Day Smoker     Packs/day: 1.00     Years: 34.00     Types: Cigarettes     Start date: 1/1/1979   Smokeless Tobacco     Never Used       Labs:  Lab Results   Component Value Date    A1C 10.9 11/21/2018     Lab Results   Component Value Date     09/21/2018     Lab Results   Component Value Date     " 2016     HDL Cholesterol   Date Value Ref Range Status   02/10/2017 44 40 - 60 mg/dL Final   ]  GFR Estimate   Date Value Ref Range Status   2018 44 (L) >60 mL/min/1.7m2 Final     Comment:     Non  GFR Calc     GFR Estimate If Black   Date Value Ref Range Status   2018 54 (L) >60 mL/min/1.7m2 Final     Comment:      GFR Calc     Lab Results   Component Value Date    CR 1.25 2018     No results found for: MICROALBUMIN    Healthy Eating  Healthy Eating Assessed Today: Yes  Cultural/Pentecostalism diet restrictions?: No  Meal planning: Smaller portions, Avoiding sweets  Meals include: Breakfast, Dinner  Breakfast: cereal or oatmeal with milk and coffee  Dinner: Taco salad, spaghetti, tuna salad with noodles or potato salad  Beverages: Water, Coffee(Stopped drinking pop)  Has patient met with a dietitian in the past?: Yes    Being Active  Exercise:: Currently not exercising  Barrier to exercise: Access    Monitoring  Monitoring Assessed Today: Yes  Did patient bring glucose meter to appointment? : Yes  Home Glucose (Sugar) Monitorin-2 times per day  Blood glucose trend: No change  Low Glucose Range (mg/dL): 180-200  High Glucose Range (mg/dL): >200  Overall Range (mg/dL): >200        Taking Medications  Diabetes Medication(s)     Insulin Sig    insulin aspart (NOVOLOG PEN) 100 UNIT/ML injection 4 units before breakfast and supper    insulin glargine (LANTUS SOLOSTAR) 100 UNIT/ML pen Inject 40 Units Subcutaneous At Bedtime    Sulfonylureas Sig    GLIPIZIDE PO Take 10 mg by mouth 2 times daily          Current Treatments: Insulin Injections, Oral Agent (monotherapy)  Dose schedule: pre-dinner  Given by: Patient  Injection/Infusion sites: Abdomen  Problems taking diabetes medications regularly?: No  Diabetes medication side effects?: No  Treatment Compliance: Most of the time    Problem Solving  Hypoglycemia Frequency: Rarely  Patient carries a carbohydrate source:  No  Medical alert: No  Severe weather/disaster plan for diabetes management?: No  DKA prevention plan?: No    Hypoglycemia symptoms  Confusion: No  Dizziness or Light-Headedness: No  Headaches: No  Hunger: Yes  Mood changes: No  Nervousness/Anxiety: No  Sleepiness: No  Speech difficulty: No  Sweats: Yes  Tremors: Yes    Hypoglycemia Complications  Blackouts: No  Hospitalization: No  Nocturnal hypoglycemia: No  Required assistance: No  Required glucagon injection: No  Seizures: No    Reducing Risks  Diabetes Risks: Age over 45 years, Hyperlipidemia  CAD Risks: Diabetes Mellitus, Dyslipidemia, Hypertension, Tobacco exposure    Healthy Coping  Healthy Coping Assessed Today: Yes  Emotional response to diabetes: Acceptance  Informal Support system:: Significant other  Stage of change: ACTION (Actively working towards change)  Difficulty affording diabetes management supplies?: No  Support resources: None  Patient Activation Measure Survey Score:  SHADI Score (Last Two) 2018   SHADI Raw Score 28   Activation Score 50   SHADI Level 2       ASSESSMENT:  Readings range as follows: fastin-278, pre-supper: 354-399 and post-supper: 202-332.    Marly has stopped drinking pop      Patient's most recent   Lab Results   Component Value Date    A1C 10.9 2018    is not meeting goal of <8.0    INTERVENTION:   Diabetes knowledge and skills assessment:     Patient is knowledgeable in diabetes management concepts related to: Monitoring and Taking Medication    Patient needs further education on the following diabetes management concepts: Taking Medication    Based on learning assessment above, most appropriate setting for further diabetes education would be: Individual setting.    Education provided today on:  AADE Self-Care Behaviors:  Taking Medication: action of prescribed medication, drawing up, administering and storing injectable diabetes medications, proper site selection and rotation for injections, side effects of  prescribed medications, when to take medications and how to use a correction scale for NovoLOG  Problem Solving: low blood glucose - causes, signs/symptoms, treatment and prevention and carrying a carbohydrate source at all times    Opportunities for ongoing education and support in diabetes-self management were discussed.    Pt verbalized understanding of concepts discussed and recommendations provided today.       Education Materials Provided:  Instructions for NovoLOG correction scale and schedule for taking it for posting    PLAN:  See Patient Instructions for co-developed, patient-stated behavior change goals.  Increase Lantus to 50 units daily    Increase NovoLOG to 10 units plus correction before breakfast and supper as follows:    Before Breakfast and Supper:    BG   Units of NovoLOG  Less than 70  None     10 units  150-200  11 units  201-250  12 units  251-300     13 units  301-350  14 units   Greater than 350 15 units    Return to Diabetes Ed in 2 weeks on 12/26/18 at 1245 pm  AVS printed and provided to patient today. See Follow-Up section for recommended follow-up.      Time Spent: 30 minutes  Encounter Type: Individual    Any diabetes medication dose changes were made via the CDE Protocol and Collaborative Practice Agreement with the patient's primary care provider. A copy of this encounter was shared with the provider.

## 2018-12-26 ENCOUNTER — HOSPITAL ENCOUNTER (OUTPATIENT)
Dept: EDUCATION SERVICES | Facility: HOSPITAL | Age: 53
Discharge: HOME | End: 2018-12-26
Attending: NURSE PRACTITIONER | Admitting: NURSE PRACTITIONER
Payer: MEDICARE

## 2018-12-26 VITALS
RESPIRATION RATE: 20 BRPM | WEIGHT: 238.1 LBS | HEART RATE: 80 BPM | BODY MASS INDEX: 40.65 KG/M2 | HEIGHT: 64 IN | SYSTOLIC BLOOD PRESSURE: 137 MMHG | DIASTOLIC BLOOD PRESSURE: 86 MMHG | OXYGEN SATURATION: 90 %

## 2018-12-26 DIAGNOSIS — Z79.4 TYPE 2 DIABETES MELLITUS WITH HYPERGLYCEMIA, WITH LONG-TERM CURRENT USE OF INSULIN (H): ICD-10-CM

## 2018-12-26 DIAGNOSIS — E11.65 TYPE 2 DIABETES MELLITUS WITH HYPERGLYCEMIA, WITH LONG-TERM CURRENT USE OF INSULIN (H): ICD-10-CM

## 2018-12-26 PROCEDURE — G0108 DIAB MANAGE TRN  PER INDIV: HCPCS

## 2018-12-26 ASSESSMENT — PAIN SCALES - GENERAL: PAINLEVEL: NO PAIN (0)

## 2018-12-26 ASSESSMENT — MIFFLIN-ST. JEOR: SCORE: 1660.01

## 2018-12-26 NOTE — PROGRESS NOTES
"Diabetes Self-Management Education & Support    Diabetes Education Self Management & Training    SUBJECTIVE/OBJECTIVE:  Presents for: Follow-up  Accompanied by: Self  Diabetes education in the past 24mo: Yes  Focus of Visit: Monitoring, Taking Medication  Insulin Type: Lantus, NovoLog  Diabetes type: Type 2  Disease course: Improving  Diabetes management related comments/concerns: BG readings remain elevated  Transportation concerns: No  Other concerns:: Glasses, Cognitive impairment  Cultural Influences/Ethnic Background:  American      Diabetes Symptoms & Complications  Blurred vision: No  Fatigue: No  Neuropathy: No  Foot ulcerations: No  Polydipsia: No  Polyphagia: No  Polyuria: No  Visual change: No  Weakness: No  Weight loss: No  Slow healing wounds: No  Recent Infection(s): No  Symptom course: Stable  Weight trend: Increasing steadily  Autonomic neuropathy: No  CVA: No  Heart disease: No  Nephropathy: Yes  Peripheral neuropathy: No  Peripheral Vascular Disease: No  Retinopathy: No  Sexual dysfunction: No    Patient Problem List and Family Medical History reviewed for relevant medical history, current medical status, and diabetes risk factors.    Vitals:  /86   Pulse 80   Resp 20   Ht 1.626 m (5' 4\")   Wt 108 kg (238 lb 1.6 oz)   SpO2 (!) 90%   BMI 40.87 kg/m    Estimated body mass index is 40.87 kg/m  as calculated from the following:    Height as of this encounter: 1.626 m (5' 4\").    Weight as of this encounter: 108 kg (238 lb 1.6 oz).   Last 3 BP:   BP Readings from Last 3 Encounters:   12/26/18 137/86   12/12/18 122/80   11/14/18 110/78       History   Smoking Status     Current Every Day Smoker     Packs/day: 1.00     Years: 34.00     Types: Cigarettes     Start date: 1/1/1979   Smokeless Tobacco     Never Used       Labs:  Lab Results   Component Value Date    A1C 10.9 11/21/2018     Lab Results   Component Value Date     09/21/2018     Lab Results   Component Value Date     " 2016     HDL Cholesterol   Date Value Ref Range Status   02/10/2017 44 40 - 60 mg/dL Final   ]  GFR Estimate   Date Value Ref Range Status   2018 44 (L) >60 mL/min/1.7m2 Final     Comment:     Non  GFR Calc     GFR Estimate If Black   Date Value Ref Range Status   2018 54 (L) >60 mL/min/1.7m2 Final     Comment:      GFR Calc     Lab Results   Component Value Date    CR 1.25 2018     No results found for: MICROALBUMIN    Healthy Eating  Healthy Eating Assessed Today: Yes  Cultural/Christianity diet restrictions?: No  Meal planning: Smaller portions, Avoiding sweets  Meals include: Breakfast, Dinner  Beverages: Water, Coffee(Stopped drinking pop)  Has patient met with a dietitian in the past?: Yes    Being Active  Exercise:: Yes  Days per week of moderate to strenuous exercise (like a brisk walk): 2  On average, minutes per day of exercise at this level: 30  How intense was your typical exercise? : Light (like stretching or slow walking)  Exercise Minutes per Week: 60  Barrier to exercise: Access([facility] worker does take her out for activity)    Monitoring  Monitoring Assessed Today: Yes  Did patient bring glucose meter to appointment? : Yes  Home Glucose (Sugar) Monitorin-2 times per day  Blood glucose trend: No change  Low Glucose Range (mg/dL): 180-200  High Glucose Range (mg/dL): >200  Overall Range (mg/dL): >200        Taking Medications  Diabetes Medication(s)     Insulin Sig    insulin aspart (NOVOLOG PEN) 100 UNIT/ML pen Inject per sliding scale before breakfast and supper - usually 14 units prior to each meal. TDD 28 units    insulin glargine (LANTUS SOLOSTAR PEN) 100 UNIT/ML pen Inject 55 Units Subcutaneous At Bedtime    Sulfonylureas Sig    GLIPIZIDE PO Take 10 mg by mouth 2 times daily          Current Treatments: Insulin Injections, Oral Agent (monotherapy)  Dose schedule: pre-dinner, pre-breakfast  Given by: Patient  Injection/Infusion sites:  Abdomen  Problems taking diabetes medications regularly?: No  Diabetes medication side effects?: No  Treatment Compliance: Most of the time    Problem Solving  Problem Solving Assessed Today: Yes  Hypoglycemia Frequency: Rarely  Hypoglycemia Treatment: Glucose (tablets or gel)  Patient carries a carbohydrate source: Yes  Medical alert: No  Severe weather/disaster plan for diabetes management?: No  DKA prevention plan?: No    Hypoglycemia symptoms  Confusion: No  Dizziness or Light-Headedness: No  Headaches: No  Hunger: Yes  Mood changes: No  Nervousness/Anxiety: No  Sleepiness: No  Speech difficulty: No  Sweats: Yes  Tremors: Yes    Hypoglycemia Complications  Blackouts: No  Hospitalization: No  Nocturnal hypoglycemia: No  Required assistance: No  Required glucagon injection: No  Seizures: No    Reducing Risks  Diabetes Risks: Age over 45 years, Hyperlipidemia  CAD Risks: Diabetes Mellitus, Dyslipidemia, Hypertension, Tobacco exposure    Healthy Coping  Healthy Coping Assessed Today: Yes  Emotional response to diabetes: Acceptance  Informal Support system:: Significant other  Stage of change: ACTION (Actively working towards change)  Difficulty affording diabetes management supplies?: No  Support resources: None  Patient Activation Measure Survey Score:  SHADI Score (Last Two) 2018   SHADI Raw Score 28   Activation Score 50   SHADI Level 2       ASSESSMENT:  Readings slowly decreasing. Fastin-265, pre-supper: 192-312 and post-supper: 168, 192-351.    Marly reports eating breakfast and evening meal. Denies eating lunch as breakfast is later in morning and still not drinking regular pop.    Marly manages her own medications, filling her own medication boxes and giving her own insulin injections. [facility] no longer monitors this. They do not ask for her AVS for medication information. Previously, [facility] workers accompanied Marly to her Diabetes Ed appointments. They no longer do this.    I did ask her to show me how  she dials up her insulin dosages and she does well with this. I print out a schedule for her at our every visit so she has instructions to follow when to test BG and take her insulins and her NovoLOG sliding scales. Marly tells me that she has her insulin dosages posted at home so they are there for her to follow. She does have trouble telling me dosages from memory.      Patient's most recent   Lab Results   Component Value Date    A1C 10.9 11/21/2018    is not meeting goal of <8.0    INTERVENTION:   Diabetes knowledge and skills assessment:     Patient is knowledgeable in diabetes management concepts related to: Monitoring and Taking Medication    Patient needs further education on the following diabetes management concepts: Healthy Eating, Being Active and Taking Medication    Based on learning assessment above, most appropriate setting for further diabetes education would be: Individual setting.    Education provided today on:  AADE Self-Care Behaviors:  Monitoring: log and interpret results, individual blood glucose targets and frequency of monitoring  Taking Medication: drawing up, administering and storing injectable diabetes medications, proper site selection and rotation for injections and when to take medications    Opportunities for ongoing education and support in diabetes-self management were discussed.    Pt verbalized understanding of concepts discussed and recommendations provided today.       Education Materials Provided:  No new materials provided today and written instructions for BG testing and insulin dosages    PLAN:  See Patient Instructions for co-developed, patient-stated behavior change goals.  Before Breakfast:    Test BG    BG   Units of NovoLOG (orange and blue)  Less than 70  None     12 units  150-200  14 units  201-250  15 units  251-300     16 units  301-350  17 units   Greater than 350 18 units      Before supper:    Test BG     Lantus (Tan) to 55 units  daily    AND    BG   Units of NovoLOG (orange and blue)  Less than 70  None     12 units  150-200  14 units  201-250  15 units  251-300     16 units  301-350  17 units   Greater than 350 18 units      At Bedtime    Test BG      Return to Diabetes Ed on January 9, 2019 at 1:00 pm.    AVS printed and provided to patient today. See Follow-Up section for recommended follow-up.      Time Spent: 40 minutes  Encounter Type: Individual    Any diabetes medication dose changes were made via the CDE Protocol and Collaborative Practice Agreement with the patient's primary care provider. A copy of this encounter was shared with the provider.

## 2018-12-26 NOTE — LETTER
"    12/26/2018        RE: Marly Cannon  [address]  [place] [address]        Diabetes Self-Management Education & Support    Diabetes Education Self Management & Training    SUBJECTIVE/OBJECTIVE:  Presents for: Follow-up  Accompanied by: Self  Diabetes education in the past 24mo: Yes  Focus of Visit: Monitoring, Taking Medication  Insulin Type: Lantus, NovoLog  Diabetes type: Type 2  Disease course: Improving  Diabetes management related comments/concerns: BG readings remain elevated  Transportation concerns: No  Other concerns:: Glasses, Cognitive impairment  Cultural Influences/Ethnic Background:  American      Diabetes Symptoms & Complications  Blurred vision: No  Fatigue: No  Neuropathy: No  Foot ulcerations: No  Polydipsia: No  Polyphagia: No  Polyuria: No  Visual change: No  Weakness: No  Weight loss: No  Slow healing wounds: No  Recent Infection(s): No  Symptom course: Stable  Weight trend: Increasing steadily  Autonomic neuropathy: No  CVA: No  Heart disease: No  Nephropathy: Yes  Peripheral neuropathy: No  Peripheral Vascular Disease: No  Retinopathy: No  Sexual dysfunction: No    Patient Problem List and Family Medical History reviewed for relevant medical history, current medical status, and diabetes risk factors.    Vitals:  /86   Pulse 80   Resp 20   Ht 1.626 m (5' 4\")   Wt 108 kg (238 lb 1.6 oz)   SpO2 (!) 90%   BMI 40.87 kg/m     Estimated body mass index is 40.87 kg/m  as calculated from the following:    Height as of this encounter: 1.626 m (5' 4\").    Weight as of this encounter: 108 kg (238 lb 1.6 oz).   Last 3 BP:   BP Readings from Last 3 Encounters:   12/26/18 137/86   12/12/18 122/80   11/14/18 110/78       History   Smoking Status     Current Every Day Smoker     Packs/day: 1.00     Years: 34.00     Types: Cigarettes     Start date: 1/1/1979   Smokeless Tobacco     Never Used       Labs:  Lab Results   Component Value Date    A1C 10.9 11/21/2018     Lab Results "   Component Value Date     2018     Lab Results   Component Value Date     2016     HDL Cholesterol   Date Value Ref Range Status   02/10/2017 44 40 - 60 mg/dL Final   ]  GFR Estimate   Date Value Ref Range Status   2018 44 (L) >60 mL/min/1.7m2 Final     Comment:     Non  GFR Calc     GFR Estimate If Black   Date Value Ref Range Status   2018 54 (L) >60 mL/min/1.7m2 Final     Comment:      GFR Calc     Lab Results   Component Value Date    CR 1.25 2018     No results found for: MICROALBUMIN    Healthy Eating  Healthy Eating Assessed Today: Yes  Cultural/Hindu diet restrictions?: No  Meal planning: Smaller portions, Avoiding sweets  Meals include: Breakfast, Dinner  Beverages: Water, Coffee(Stopped drinking pop)  Has patient met with a dietitian in the past?: Yes    Being Active  Exercise:: Yes  Days per week of moderate to strenuous exercise (like a brisk walk): 2  On average, minutes per day of exercise at this level: 30  How intense was your typical exercise? : Light (like stretching or slow walking)  Exercise Minutes per Week: 60  Barrier to exercise: Access([facility] worker does take her out for activity)    Monitoring  Monitoring Assessed Today: Yes  Did patient bring glucose meter to appointment? : Yes  Home Glucose (Sugar) Monitorin-2 times per day  Blood glucose trend: No change  Low Glucose Range (mg/dL): 180-200  High Glucose Range (mg/dL): >200  Overall Range (mg/dL): >200        Taking Medications  Diabetes Medication(s)     Insulin Sig    insulin aspart (NOVOLOG PEN) 100 UNIT/ML pen Inject per sliding scale before breakfast and supper - usually 14 units prior to each meal. TDD 28 units    insulin glargine (LANTUS SOLOSTAR PEN) 100 UNIT/ML pen Inject 55 Units Subcutaneous At Bedtime    Sulfonylureas Sig    GLIPIZIDE PO Take 10 mg by mouth 2 times daily          Current Treatments: Insulin Injections, Oral Agent  (monotherapy)  Dose schedule: pre-dinner, pre-breakfast  Given by: Patient  Injection/Infusion sites: Abdomen  Problems taking diabetes medications regularly?: No  Diabetes medication side effects?: No  Treatment Compliance: Most of the time    Problem Solving  Problem Solving Assessed Today: Yes  Hypoglycemia Frequency: Rarely  Hypoglycemia Treatment: Glucose (tablets or gel)  Patient carries a carbohydrate source: Yes  Medical alert: No  Severe weather/disaster plan for diabetes management?: No  DKA prevention plan?: No    Hypoglycemia symptoms  Confusion: No  Dizziness or Light-Headedness: No  Headaches: No  Hunger: Yes  Mood changes: No  Nervousness/Anxiety: No  Sleepiness: No  Speech difficulty: No  Sweats: Yes  Tremors: Yes    Hypoglycemia Complications  Blackouts: No  Hospitalization: No  Nocturnal hypoglycemia: No  Required assistance: No  Required glucagon injection: No  Seizures: No    Reducing Risks  Diabetes Risks: Age over 45 years, Hyperlipidemia  CAD Risks: Diabetes Mellitus, Dyslipidemia, Hypertension, Tobacco exposure    Healthy Coping  Healthy Coping Assessed Today: Yes  Emotional response to diabetes: Acceptance  Informal Support system:: Significant other  Stage of change: ACTION (Actively working towards change)  Difficulty affording diabetes management supplies?: No  Support resources: None  Patient Activation Measure Survey Score:  SHADI Score (Last Two) 2018   SHADI Raw Score 28   Activation Score 50   SHADI Level 2       ASSESSMENT:  Readings slowly decreasing. Fastin-265, pre-supper: 192-312 and post-supper: 168, 192-351.    Marly reports eating breakfast and evening meal. Denies eating lunch as breakfast is later in morning and still not drinking regular pop.    Marly manages her own medications, filling her own medication boxes and giving her own insulin injections. [facility] no longer monitors this. They do not ask for her AVS for medication information. Previously, [facility] workers  accompanied Marly to her Diabetes Ed appointments. They no longer do this.    I did ask her to show me how she dials up her insulin dosages and she does well with this. I print out a schedule for her at our every visit so she has instructions to follow when to test BG and take her insulins and her NovoLOG sliding scales. Marly tells me that she has her insulin dosages posted at home so they are there for her to follow. She does have trouble telling me dosages from memory.      Patient's most recent   Lab Results   Component Value Date    A1C 10.9 11/21/2018    is not meeting goal of <8.0    INTERVENTION:   Diabetes knowledge and skills assessment:     Patient is knowledgeable in diabetes management concepts related to: Monitoring and Taking Medication    Patient needs further education on the following diabetes management concepts: Healthy Eating, Being Active and Taking Medication    Based on learning assessment above, most appropriate setting for further diabetes education would be: Individual setting.    Education provided today on:  AADE Self-Care Behaviors:  Monitoring: log and interpret results, individual blood glucose targets and frequency of monitoring  Taking Medication: drawing up, administering and storing injectable diabetes medications, proper site selection and rotation for injections and when to take medications    Opportunities for ongoing education and support in diabetes-self management were discussed.    Pt verbalized understanding of concepts discussed and recommendations provided today.       Education Materials Provided:  No new materials provided today and written instructions for BG testing and insulin dosages    PLAN:  See Patient Instructions for co-developed, patient-stated behavior change goals.  Before Breakfast:    Test BG    BG   Units of NovoLOG (orange and blue)  Less than 70  None     12 units  150-200  14 units  201-250  15 units  251-300     16 units  301-350  17 units    Greater than 350 18 units      Before supper:    Test BG     Lantus (Gray) to 55 units daily    AND    BG   Units of NovoLOG (orange and blue)  Less than 70  None     12 units  150-200  14 units  201-250  15 units  251-300     16 units  301-350  17 units   Greater than 350 18 units      At Bedtime    Test BG      Return to Diabetes Ed on January 9, 2019 at 1:00 pm.    AVS printed and provided to patient today. See Follow-Up section for recommended follow-up.      Time Spent: 40 minutes  Encounter Type: Individual    Any diabetes medication dose changes were made via the CDE Protocol and Collaborative Practice Agreement with the patient's primary care provider. A copy of this encounter was shared with the provider.          Sincerely,        Kat Dawn RN

## 2018-12-26 NOTE — PATIENT INSTRUCTIONS
Before Breakfast:    Test BG    BG   Units of NovoLOG (orange and blue)  Less than 70  None     12 units  150-200  14 units  201-250  15 units  251-300     16 units  301-350  17 units   Greater than 350 18 units      Before supper:    Test BG     Lantus (Gray) to 55 units daily    AND    BG   Units of NovoLOG (orange and blue)  Less than 70  None     12 units  150-200  14 units  201-250  15 units  251-300     16 units  301-350  17 units   Greater than 350 18 units      At Bedtime    Test BG      Return to Diabetes Ed on January 9, 2019 at 1:00 pm.

## 2018-12-26 NOTE — LETTER
"    12/26/2018        RE: Marly Cannon  [address]  [place] [address]        Diabetes Self-Management Education & Support    Diabetes Education Self Management & Training    SUBJECTIVE/OBJECTIVE:  Presents for: Follow-up  Accompanied by: Self  Diabetes education in the past 24mo: Yes  Focus of Visit: Monitoring, Taking Medication  Insulin Type: Lantus, NovoLog  Diabetes type: Type 2  Disease course: Improving  Diabetes management related comments/concerns: BG readings remain elevated  Transportation concerns: No  Other concerns:: Glasses, Cognitive impairment  Cultural Influences/Ethnic Background:  American      Diabetes Symptoms & Complications  Blurred vision: No  Fatigue: No  Neuropathy: No  Foot ulcerations: No  Polydipsia: No  Polyphagia: No  Polyuria: No  Visual change: No  Weakness: No  Weight loss: No  Slow healing wounds: No  Recent Infection(s): No  Symptom course: Stable  Weight trend: Increasing steadily  Autonomic neuropathy: No  CVA: No  Heart disease: No  Nephropathy: Yes  Peripheral neuropathy: No  Peripheral Vascular Disease: No  Retinopathy: No  Sexual dysfunction: No    Patient Problem List and Family Medical History reviewed for relevant medical history, current medical status, and diabetes risk factors.    Vitals:  /86   Pulse 80   Resp 20   Ht 1.626 m (5' 4\")   Wt 108 kg (238 lb 1.6 oz)   SpO2 (!) 90%   BMI 40.87 kg/m     Estimated body mass index is 40.87 kg/m  as calculated from the following:    Height as of this encounter: 1.626 m (5' 4\").    Weight as of this encounter: 108 kg (238 lb 1.6 oz).   Last 3 BP:   BP Readings from Last 3 Encounters:   12/26/18 137/86   12/12/18 122/80   11/14/18 110/78       History   Smoking Status     Current Every Day Smoker     Packs/day: 1.00     Years: 34.00     Types: Cigarettes     Start date: 1/1/1979   Smokeless Tobacco     Never Used       Labs:  Lab Results   Component Value Date    A1C 10.9 11/21/2018     Lab Results "   Component Value Date     2018     Lab Results   Component Value Date     2016     HDL Cholesterol   Date Value Ref Range Status   02/10/2017 44 40 - 60 mg/dL Final   ]  GFR Estimate   Date Value Ref Range Status   2018 44 (L) >60 mL/min/1.7m2 Final     Comment:     Non  GFR Calc     GFR Estimate If Black   Date Value Ref Range Status   2018 54 (L) >60 mL/min/1.7m2 Final     Comment:      GFR Calc     Lab Results   Component Value Date    CR 1.25 2018     No results found for: MICROALBUMIN    Healthy Eating  Healthy Eating Assessed Today: Yes  Cultural/Presybeterian diet restrictions?: No  Meal planning: Smaller portions, Avoiding sweets  Meals include: Breakfast, Dinner  Beverages: Water, Coffee(Stopped drinking pop)  Has patient met with a dietitian in the past?: Yes    Being Active  Exercise:: Yes  Days per week of moderate to strenuous exercise (like a brisk walk): 2  On average, minutes per day of exercise at this level: 30  How intense was your typical exercise? : Light (like stretching or slow walking)  Exercise Minutes per Week: 60  Barrier to exercise: Access([facility] worker does take her out for activity)    Monitoring  Monitoring Assessed Today: Yes  Did patient bring glucose meter to appointment? : Yes  Home Glucose (Sugar) Monitorin-2 times per day  Blood glucose trend: No change  Low Glucose Range (mg/dL): 180-200  High Glucose Range (mg/dL): >200  Overall Range (mg/dL): >200        Taking Medications  Diabetes Medication(s)     Insulin Sig    insulin aspart (NOVOLOG PEN) 100 UNIT/ML pen Inject per sliding scale before breakfast and supper - usually 14 units prior to each meal. TDD 28 units    insulin glargine (LANTUS SOLOSTAR PEN) 100 UNIT/ML pen Inject 55 Units Subcutaneous At Bedtime    Sulfonylureas Sig    GLIPIZIDE PO Take 10 mg by mouth 2 times daily          Current Treatments: Insulin Injections, Oral Agent  (monotherapy)  Dose schedule: pre-dinner, pre-breakfast  Given by: Patient  Injection/Infusion sites: Abdomen  Problems taking diabetes medications regularly?: No  Diabetes medication side effects?: No  Treatment Compliance: Most of the time    Problem Solving  Problem Solving Assessed Today: Yes  Hypoglycemia Frequency: Rarely  Hypoglycemia Treatment: Glucose (tablets or gel)  Patient carries a carbohydrate source: Yes  Medical alert: No  Severe weather/disaster plan for diabetes management?: No  DKA prevention plan?: No    Hypoglycemia symptoms  Confusion: No  Dizziness or Light-Headedness: No  Headaches: No  Hunger: Yes  Mood changes: No  Nervousness/Anxiety: No  Sleepiness: No  Speech difficulty: No  Sweats: Yes  Tremors: Yes    Hypoglycemia Complications  Blackouts: No  Hospitalization: No  Nocturnal hypoglycemia: No  Required assistance: No  Required glucagon injection: No  Seizures: No    Reducing Risks  Diabetes Risks: Age over 45 years, Hyperlipidemia  CAD Risks: Diabetes Mellitus, Dyslipidemia, Hypertension, Tobacco exposure    Healthy Coping  Healthy Coping Assessed Today: Yes  Emotional response to diabetes: Acceptance  Informal Support system:: Significant other  Stage of change: ACTION (Actively working towards change)  Difficulty affording diabetes management supplies?: No  Support resources: None  Patient Activation Measure Survey Score:  SHADI Score (Last Two) 2018   SHADI Raw Score 28   Activation Score 50   SHADI Level 2       ASSESSMENT:  Readings slowly decreasing. Fastin-265, pre-supper: 192-312 and post-supper: 168, 192-351.    Marly reports eating breakfast and evening meal. Denies eating lunch as breakfast is later in morning and still not drinking regular pop.    Marly manages her own medications, filling her own medication boxes and giving her own insulin injections. [facility] no longer monitors this. They do not ask for her AVS for medication information. Previously, [facility] workers  accompanied Marly to her Diabetes Ed appointments. They no longer do this.    I did ask her to show me how she dials up her insulin dosages and she does well with this. I print out a schedule for her at our every visit so she has instructions to follow when to test BG and take her insulins and her NovoLOG sliding scales. Marly tells me that she has her insulin dosages posted at home so they are there for her to follow. She does have trouble telling me dosages from memory.      Patient's most recent   Lab Results   Component Value Date    A1C 10.9 11/21/2018    is not meeting goal of <8.0    INTERVENTION:   Diabetes knowledge and skills assessment:     Patient is knowledgeable in diabetes management concepts related to: Monitoring and Taking Medication    Patient needs further education on the following diabetes management concepts: Healthy Eating, Being Active and Taking Medication    Based on learning assessment above, most appropriate setting for further diabetes education would be: Individual setting.    Education provided today on:  AADE Self-Care Behaviors:  Monitoring: log and interpret results, individual blood glucose targets and frequency of monitoring  Taking Medication: drawing up, administering and storing injectable diabetes medications, proper site selection and rotation for injections and when to take medications    Opportunities for ongoing education and support in diabetes-self management were discussed.    Pt verbalized understanding of concepts discussed and recommendations provided today.       Education Materials Provided:  No new materials provided today and written instructions for BG testing and insulin dosages    PLAN:  See Patient Instructions for co-developed, patient-stated behavior change goals.  Before Breakfast:    Test BG    BG   Units of NovoLOG (orange and blue)  Less than 70  None     12 units  150-200  14 units  201-250  15 units  251-300     16 units  301-350  17 units    Greater than 350 18 units      Before supper:    Test BG     Lantus (Gray) to 55 units daily    AND    BG   Units of NovoLOG (orange and blue)  Less than 70  None     12 units  150-200  14 units  201-250  15 units  251-300     16 units  301-350  17 units   Greater than 350 18 units      At Bedtime    Test BG      Return to Diabetes Ed on January 9, 2019 at 1:00 pm.    AVS printed and provided to patient today. See Follow-Up section for recommended follow-up.      Time Spent: 40 minutes  Encounter Type: Individual    Any diabetes medication dose changes were made via the CDE Protocol and Collaborative Practice Agreement with the patient's primary care provider. A copy of this encounter was shared with the provider.          Sincerely,        Kat Dawn RN

## 2019-01-09 ENCOUNTER — HOSPITAL ENCOUNTER (OUTPATIENT)
Dept: EDUCATION SERVICES | Facility: HOSPITAL | Age: 54
Discharge: HOME | End: 2019-01-09
Attending: NURSE PRACTITIONER | Admitting: NURSE PRACTITIONER
Payer: MEDICARE

## 2019-01-09 VITALS
WEIGHT: 239.3 LBS | DIASTOLIC BLOOD PRESSURE: 88 MMHG | BODY MASS INDEX: 40.85 KG/M2 | RESPIRATION RATE: 16 BRPM | HEART RATE: 92 BPM | HEIGHT: 64 IN | SYSTOLIC BLOOD PRESSURE: 133 MMHG | OXYGEN SATURATION: 90 %

## 2019-01-09 DIAGNOSIS — Z79.4 TYPE 2 DIABETES MELLITUS WITH HYPERGLYCEMIA, WITH LONG-TERM CURRENT USE OF INSULIN (H): Primary | ICD-10-CM

## 2019-01-09 DIAGNOSIS — E11.65 TYPE 2 DIABETES MELLITUS WITH HYPERGLYCEMIA, WITH LONG-TERM CURRENT USE OF INSULIN (H): Primary | ICD-10-CM

## 2019-01-09 PROCEDURE — G0108 DIAB MANAGE TRN  PER INDIV: HCPCS

## 2019-01-09 ASSESSMENT — MIFFLIN-ST. JEOR: SCORE: 1665.46

## 2019-01-09 ASSESSMENT — PAIN SCALES - GENERAL: PAINLEVEL: NO PAIN (0)

## 2019-01-09 NOTE — PATIENT INSTRUCTIONS
Before Breakfast:     Test BG     BG                               Units of NovoLOG (orange and blue)  Less than 70              None                           14 units  150-200                       16 units  201-250                       17 units  251-300                       18 units  301-350                       19 units   Greater than 350       20 units        Before supper:     Test BG      Lantus (Gray) to 55 units daily     AND     BG                               Units of NovoLOG (orange and blue)  Less than 70              None                           14 units  150-200                       16 units  201-250                       17 units  251-300                       18 units  301-350                       19 units   Greater than 350       20 units        At Bedtime     Test BG

## 2019-01-13 NOTE — PROGRESS NOTES
"Diabetes Self-Management Education & Support    Diabetes Education Self Management & Training    SUBJECTIVE/OBJECTIVE:  Presents for: Follow-up  Accompanied by: Self  Diabetes education in the past 24mo: Yes  Focus of Visit: Monitoring, Taking Medication  Insulin Type: Lantus, NovoLog  Diabetes type: Type 2  Disease course: Improving  Diabetes management related comments/concerns: BG readings remain elevated but trending down  Transportation concerns: No  Other concerns:: Glasses, Cognitive impairment  Cultural Influences/Ethnic Background:  American      Diabetes Symptoms & Complications  Blurred vision: No  Fatigue: No  Neuropathy: No  Foot ulcerations: No  Polydipsia: No  Polyphagia: No  Polyuria: No  Visual change: No  Weakness: No  Weight loss: No  Slow healing wounds: No  Recent Infection(s): No  Symptom course: Stable  Weight trend: Increasing steadily  Autonomic neuropathy: No  CVA: No  Heart disease: No  Nephropathy: Yes  Peripheral neuropathy: No  Peripheral Vascular Disease: No  Retinopathy: No  Sexual dysfunction: No    Patient Problem List and Family Medical History reviewed for relevant medical history, current medical status, and diabetes risk factors.    Vitals:  /88   Pulse 92   Resp 16   Ht 1.626 m (5' 4\")   Wt 108.5 kg (239 lb 4.8 oz)   SpO2 (!) 90%   BMI 41.08 kg/m    Estimated body mass index is 41.08 kg/m  as calculated from the following:    Height as of this encounter: 1.626 m (5' 4\").    Weight as of this encounter: 108.5 kg (239 lb 4.8 oz).   Last 3 BP:   BP Readings from Last 3 Encounters:   01/09/19 133/88   12/26/18 137/86   12/12/18 122/80       History   Smoking Status     Current Every Day Smoker     Packs/day: 1.00     Years: 34.00     Types: Cigarettes     Start date: 1/1/1979   Smokeless Tobacco     Never Used       Labs:  Lab Results   Component Value Date    A1C 10.9 11/21/2018     Lab Results   Component Value Date     09/21/2018     Lab Results   Component " "Value Date     2016     HDL Cholesterol   Date Value Ref Range Status   02/10/2017 44 40 - 60 mg/dL Final   ]  GFR Estimate   Date Value Ref Range Status   2018 44 (L) >60 mL/min/1.7m2 Final     Comment:     Non  GFR Calc     GFR Estimate If Black   Date Value Ref Range Status   2018 54 (L) >60 mL/min/1.7m2 Final     Comment:      GFR Calc     Lab Results   Component Value Date    CR 1.25 2018     No results found for: MICROALBUMIN    Healthy Eating  Healthy Eating Assessed Today: Yes  Cultural/Mu-ism diet restrictions?: No  Meal planning: Smaller portions, Avoiding sweets  Meals include: Breakfast, Dinner  Beverages: Water, Coffee(Stopped drinking pop)  Has patient met with a dietitian in the past?: Yes    Being Active  Exercise:: Yes  How intense was your typical exercise? : (Went to the [facility] with [facility] staff)  Barrier to exercise: Access([employer] worker does take her out for activity)    Monitoring  Monitoring Assessed Today: Yes  Did patient bring glucose meter to appointment? : Yes  Home Glucose (Sugar) Monitorin-2 times per day(Needs to be testing before supper for NovoLOG dose)  Blood glucose trend: No change  Low Glucose Range (mg/dL): 140-180  High Glucose Range (mg/dL): >200  Overall Range (mg/dL): >200        Taking Medications  Diabetes Medication(s)     Insulin Sig    insulin aspart (NOVOLOG PEN) 100 UNIT/ML pen Inject per sliding scale before breakfast and supper - usually 14 units prior to each meal. TDD 28 units    insulin glargine (LANTUS SOLOSTAR PEN) 100 UNIT/ML pen Inject 55 Units Subcutaneous At Bedtime    Sulfonylureas Sig    GLIPIZIDE PO Take 10 mg by mouth 2 times daily          Taking Medication Assessed Today: Yes  Current Treatments: Insulin Injections, Oral Agent (monotherapy)  Dose schedule: pre-dinner, pre-breakfast  Given by: Patient  Injection/Infusion sites: Abdomen  Problems taking diabetes medications regularly?: " No  Diabetes medication side effects?: No  Treatment Compliance: Most of the time(Reports taking NovoLOG at breakfast and supper)    Problem Solving  Problem Solving Assessed Today: Yes  Hypoglycemia Frequency: Rarely  Hypoglycemia Treatment: Glucose (tablets or gel)  Patient carries a carbohydrate source: Yes  Medical alert: No  Severe weather/disaster plan for diabetes management?: No  DKA prevention plan?: No    Hypoglycemia symptoms  Confusion: No  Dizziness or Light-Headedness: No  Headaches: No  Hunger: Yes  Mood changes: No  Nervousness/Anxiety: No  Sleepiness: No  Speech difficulty: No  Sweats: Yes  Tremors: Yes    Hypoglycemia Complications  Blackouts: No  Hospitalization: No  Nocturnal hypoglycemia: No  Required assistance: No  Required glucagon injection: No  Seizures: No    Reducing Risks  Diabetes Risks: Age over 45 years, Hyperlipidemia  CAD Risks: Diabetes Mellitus, Dyslipidemia, Hypertension, Tobacco exposure  Has dilated eye exam at least once a year?: Yes    Healthy Coping  Healthy Coping Assessed Today: Yes  Emotional response to diabetes: Acceptance  Informal Support system:: Significant other  Stage of change: ACTION (Actively working towards change)  Difficulty affording diabetes management supplies?: No  Support resources: None  Patient Activation Measure Survey Score:  SHADI Score (Last Two) 2018   SHADI Raw Score 28   Activation Score 50   SHADI Level 2       ASSESSMENT:  Readings range as follows: fastin, 177-271, pre-supper: 215-343 and post-supper: 275    Marly is not testing before supper daily, although she tells me that she is taking NovoLOG before supper. She states that she doesn't want to increase the Lantus because the injections burn.    I am understanding from Marly that her current support services do not manage her medications at home. They no longer accompany her to our appointments.        Patient's most recent   Lab Results   Component Value Date    A1C 10.9  11/21/2018    is not meeting goal of <8.0    INTERVENTION:   Diabetes knowledge and skills assessment:     Patient is knowledgeable in diabetes management concepts related to: Monitoring and Taking Medication    Patient needs further education on the following diabetes management concepts: Monitoring and Taking Medication    Based on learning assessment above, most appropriate setting for further diabetes education would be: Individual setting.    Education provided today on:  AADE Self-Care Behaviors:  Monitoring: log and interpret results, individual blood glucose targets and frequency of monitoring  Taking Medication: action of prescribed medication, drawing up, administering and storing injectable diabetes medications, proper site selection and rotation for injections and when to take medications    Opportunities for ongoing education and support in diabetes-self management were discussed.    Pt verbalized understanding of concepts discussed and recommendations provided today.       Education Materials Provided:  No new materials provided today    PLAN:  See Patient Instructions for co-developed, patient-stated behavior change goals.  Before Breakfast:     Test BG     BG                               Units of NovoLOG (orange and blue)  Less than 70              None                           14 units  150-200                       16 units  201-250                       17 units  251-300                       18 units  301-350                       19 units   Greater than 350       20 units        Before supper:     Test BG      Lantus (Gray) to 55 units daily     AND     BG                               Units of NovoLOG (orange and blue)  Less than 70              None                           14 units  150-200                       16 units  201-250                       17 units  251-300                       18 units  301-350                       19 units   Greater than 350       20  units        At Bedtime     Test BG  AVS printed and provided to patient today. See Follow-Up section for recommended follow-up.      Time Spent: 30 minutes  Encounter Type: Individual    Any diabetes medication dose changes were made via the CDE Protocol and Collaborative Practice Agreement with the patient's primary care provider. A copy of this encounter was shared with the provider.

## 2019-01-15 ENCOUNTER — TELEPHONE (OUTPATIENT)
Dept: EDUCATION SERVICES | Facility: HOSPITAL | Age: 54
End: 2019-01-15

## 2019-01-15 DIAGNOSIS — E11.65 TYPE 2 DIABETES MELLITUS WITH HYPERGLYCEMIA, WITH LONG-TERM CURRENT USE OF INSULIN (H): Primary | ICD-10-CM

## 2019-01-15 DIAGNOSIS — Z79.4 TYPE 2 DIABETES MELLITUS WITH HYPERGLYCEMIA, WITH LONG-TERM CURRENT USE OF INSULIN (H): Primary | ICD-10-CM

## 2019-01-17 ENCOUNTER — TELEPHONE (OUTPATIENT)
Dept: EDUCATION SERVICES | Facility: HOSPITAL | Age: 54
End: 2019-01-17

## 2019-01-17 NOTE — TELEPHONE ENCOUNTER
Called Marly. Instructed her to stop the Lantus and not to use it anymore. She verbalizes understanding.    Instructed herthat she will be using the Tresiba

## 2019-01-17 NOTE — TELEPHONE ENCOUNTER
Pt calls she received her Tresiba and would like to know when she should start this? Does she need to make any changes in her current meds?

## 2019-01-23 ENCOUNTER — HOSPITAL ENCOUNTER (OUTPATIENT)
Dept: EDUCATION SERVICES | Facility: HOSPITAL | Age: 54
Discharge: HOME | End: 2019-01-23
Attending: INTERNAL MEDICINE | Admitting: INTERNAL MEDICINE
Payer: MEDICARE

## 2019-01-23 VITALS
BODY MASS INDEX: 40.53 KG/M2 | HEIGHT: 64 IN | OXYGEN SATURATION: 91 % | WEIGHT: 237.4 LBS | RESPIRATION RATE: 16 BRPM | DIASTOLIC BLOOD PRESSURE: 90 MMHG | SYSTOLIC BLOOD PRESSURE: 135 MMHG | HEART RATE: 95 BPM

## 2019-01-23 DIAGNOSIS — Z79.4 TYPE 2 DIABETES MELLITUS WITH HYPERGLYCEMIA, WITH LONG-TERM CURRENT USE OF INSULIN (H): Primary | ICD-10-CM

## 2019-01-23 DIAGNOSIS — E11.65 TYPE 2 DIABETES MELLITUS WITH HYPERGLYCEMIA, WITH LONG-TERM CURRENT USE OF INSULIN (H): Primary | ICD-10-CM

## 2019-01-23 PROCEDURE — 99207 ZZC NO CHARGE DRC TIME CRITERIA NOT MET: CPT

## 2019-01-23 ASSESSMENT — MIFFLIN-ST. JEOR: SCORE: 1656.84

## 2019-01-23 ASSESSMENT — PAIN SCALES - GENERAL: PAINLEVEL: NO PAIN (0)

## 2019-01-23 NOTE — PATIENT INSTRUCTIONS
Before breakfast:  Check Blood Glucose  Inject 10 units of NovoLOG (orange pen)    Before supper:  Check Blood Glucose  Inject Tresiba 60 units daily (lime green pen)  Inject NovoLOG 10 units (orange pen)    At 9:00 PM:  Check Blood Glucose

## 2019-01-26 NOTE — PROGRESS NOTES
Marly is here for BG review and insulin adjust. She is taking Tresiba 55 units daily and NovoLOG before meals: breakfast and supper per sliding scale.    Readings range as follows: fastin-308, pre-supper: 174-485 and post-supper: 218-314.    Marly states that she likes the Tresiba better than the Lantus. BG readings look like she is not taking NovoLOG and I asked her if she is taking the NovoLOG. Marly tells me that she has not been taking the NovoLOG since before . She tells me that she is not taking it because she feels ok.    I discussed with Marly why she must take the insulin. Changed NovoLOG instructions to just 10 units before meals and Marly agrees to take it.    Notified PCP by phone.  Plan:  Before breakfast:  Check Blood Glucose  Inject 10 units of NovoLOG (orange pen)    Before supper:  Check Blood Glucose  Inject Tresiba 60 units daily (lime green pen)  Inject NovoLOG 10 units (orange pen)    At 9:00 PM:  Check Blood Glucose    Return to Diabetes Ed in 2 weeks.    Time spent with patient 15 minutes

## 2019-01-29 DIAGNOSIS — E11.65 TYPE 2 DIABETES MELLITUS WITH HYPERGLYCEMIA, WITHOUT LONG-TERM CURRENT USE OF INSULIN (H): ICD-10-CM

## 2019-01-29 NOTE — TELEPHONE ENCOUNTER
Ulticare Pen needles      Last Written Prescription Date:  12/5/17  Last Fill Quantity: 100,   # refills: 10  Last Office Visit: 11/14/18  Future Office visit:       Routing refill request to provider for review/approval because:

## 2019-02-06 ENCOUNTER — HOSPITAL ENCOUNTER (OUTPATIENT)
Dept: EDUCATION SERVICES | Facility: HOSPITAL | Age: 54
End: 2019-02-06
Attending: NURSE PRACTITIONER
Payer: MEDICARE

## 2019-02-06 VITALS
SYSTOLIC BLOOD PRESSURE: 110 MMHG | HEIGHT: 64 IN | WEIGHT: 238 LBS | HEART RATE: 91 BPM | BODY MASS INDEX: 40.63 KG/M2 | DIASTOLIC BLOOD PRESSURE: 80 MMHG | OXYGEN SATURATION: 91 %

## 2019-02-06 DIAGNOSIS — E11.65 TYPE 2 DIABETES MELLITUS WITH HYPERGLYCEMIA, WITH LONG-TERM CURRENT USE OF INSULIN (H): Primary | ICD-10-CM

## 2019-02-06 DIAGNOSIS — Z79.4 TYPE 2 DIABETES MELLITUS WITH HYPERGLYCEMIA, WITH LONG-TERM CURRENT USE OF INSULIN (H): Primary | ICD-10-CM

## 2019-02-06 PROCEDURE — 99207 ZZC NO CHARGE DRC TIME CRITERIA NOT MET: CPT

## 2019-02-06 RX ORDER — LANCETS 33 GAUGE
1 EACH MISCELLANEOUS 3 TIMES DAILY
Qty: 100 EACH | Refills: 10 | Status: SHIPPED | OUTPATIENT
Start: 2019-02-06 | End: 2022-04-20

## 2019-02-06 RX ORDER — LANCETS 33 GAUGE
1 EACH MISCELLANEOUS 3 TIMES DAILY
Qty: 100 EACH | Refills: 10 | Status: SHIPPED | OUTPATIENT
Start: 2019-02-06 | End: 2019-02-06

## 2019-02-06 ASSESSMENT — MIFFLIN-ST. JEOR: SCORE: 1659.56

## 2019-02-06 ASSESSMENT — PAIN SCALES - GENERAL: PAINLEVEL: NO PAIN (0)

## 2019-02-06 NOTE — PATIENT INSTRUCTIONS
Before breakfast:  Check Blood Glucose  Inject NovoLOG 14 units (orange pen)    If you are going eat lunch:  Inject NovoLOG 14 units (orange pen)    If you skip lunch, don't take the NovoLOG     Before supper:  Check Blood Glucose  Inject Tresiba 65 units daily (lime green pen)  Inject NovoLOG 14 units (orange pen)     At 9:00 PM:  Check Blood Glucose    Call Kat if you have any lows: [phone]

## 2019-02-06 NOTE — LETTER
"    2019        RE: Marly Cannon   [address]  [place] [address]        Marly is here for an insulin adjust. /80 (BP Location: Right arm, Patient Position: Chair, Cuff Size: Adult Large)   Pulse 91   Ht 1.626 m (5' 4\")   Wt 108 kg (238 lb)   SpO2 (!) 91%   BMI 40.85 kg/m       At our last visit, Marly told me that she just wasn't taking her NovoLOG insulin.She has been taking Tresiba 60 units daily and NovoLOG 14 units prior to breakfast and supper. Marly tells me that there are sometimes when she does eat lunch. States that she is taking all insulin dosages. Denies eating in the middle of the night.    Readings range as follows: fastin,120,144, 145, 179-233, pre-supper: 184, 198, 212-318, and post-supper: 166, 207-358. Period average: 230.    Plan:   Before breakfast:  Check Blood Glucose  Inject NovoLOG 14 units (orange pen)    If you are going eat lunch:  Inject NovoLOG 14 units (orange pen)    If you skip lunch, don't take the NovoLOG     Before supper:  Check Blood Glucose  Inject Tresiba 65 units daily (lime green pen)  Inject NovoLOG 14 units (orange pen)     At 9:00 PM:  Check Blood Glucose    Call Kat if you have any lows: [phone]     Time spent with patient 15 minutes        Sincerely,        Kat Dawn RN    "

## 2019-02-07 NOTE — PROGRESS NOTES
"Marly is here for an insulin adjust. /80 (BP Location: Right arm, Patient Position: Chair, Cuff Size: Adult Large)   Pulse 91   Ht 1.626 m (5' 4\")   Wt 108 kg (238 lb)   SpO2 (!) 91%   BMI 40.85 kg/m      At our last visit, Marly told me that she just wasn't taking her NovoLOG insulin.She has been taking Tresiba 60 units daily and NovoLOG 14 units prior to breakfast and supper. Marly tells me that there are sometimes when she does eat lunch. States that she is taking all insulin dosages. Denies eating in the middle of the night.    Readings range as follows: fastin,120,144, 145, 179-233, pre-supper: 184, 198, 212-318, and post-supper: 166, 207-358. Period average: 230.    Plan:   Before breakfast:  Check Blood Glucose  Inject NovoLOG 14 units (orange pen)    If you are going eat lunch:  Inject NovoLOG 14 units (orange pen)    If you skip lunch, don't take the NovoLOG     Before supper:  Check Blood Glucose  Inject Tresiba 65 units daily (lime green pen)  Inject NovoLOG 14 units (orange pen)     At 9:00 PM:  Check Blood Glucose    Call Kat if you have any lows: [phone]     Time spent with patient 15 minutes    "

## 2019-02-22 ENCOUNTER — TRANSFERRED RECORDS (OUTPATIENT)
Dept: HEALTH INFORMATION MANAGEMENT | Facility: CLINIC | Age: 54
End: 2019-02-22

## 2019-02-22 LAB
CHOLEST SERPL-MCNC: 154 MG/DL (ref 114–200)
CREAT SERPL-MCNC: 1.68 MG/DL (ref 0.4–1)
GFR SERPL CREATININE-BSD FRML MDRD: 32 ML/MIN/1.73M2
GLUCOSE SERPL-MCNC: 281 MG/DL (ref 70–99)
HBA1C MFR BLD: 9.8 % (ref 4–5.6)
HDLC SERPL-MCNC: 36 MG/DL (ref 40–60)
LDLC SERPL CALC-MCNC: 86 MG/DL
POTASSIUM SERPL-SCNC: 3.7 MEQ/L (ref 3.4–5.1)
TRIGL SERPL-MCNC: 159 MG/DL (ref 10–200)

## 2019-02-25 ENCOUNTER — TRANSFERRED RECORDS (OUTPATIENT)
Dept: HEALTH INFORMATION MANAGEMENT | Facility: CLINIC | Age: 54
End: 2019-02-25

## 2019-02-27 ENCOUNTER — HOSPITAL ENCOUNTER (OUTPATIENT)
Dept: EDUCATION SERVICES | Facility: HOSPITAL | Age: 54
Discharge: HOME | End: 2019-02-27
Attending: NURSE PRACTITIONER | Admitting: NURSE PRACTITIONER
Payer: MEDICARE

## 2019-02-27 VITALS
RESPIRATION RATE: 20 BRPM | HEIGHT: 64 IN | BODY MASS INDEX: 39.75 KG/M2 | WEIGHT: 232.8 LBS | SYSTOLIC BLOOD PRESSURE: 127 MMHG | DIASTOLIC BLOOD PRESSURE: 89 MMHG | OXYGEN SATURATION: 92 % | HEART RATE: 91 BPM

## 2019-02-27 DIAGNOSIS — Z79.4 TYPE 2 DIABETES MELLITUS WITH HYPERGLYCEMIA, WITH LONG-TERM CURRENT USE OF INSULIN (H): Primary | ICD-10-CM

## 2019-02-27 DIAGNOSIS — E11.65 TYPE 2 DIABETES MELLITUS WITH HYPERGLYCEMIA, WITH LONG-TERM CURRENT USE OF INSULIN (H): Primary | ICD-10-CM

## 2019-02-27 PROCEDURE — G0108 DIAB MANAGE TRN  PER INDIV: HCPCS

## 2019-02-27 ASSESSMENT — PAIN SCALES - GENERAL: PAINLEVEL: NO PAIN (0)

## 2019-02-27 ASSESSMENT — MIFFLIN-ST. JEOR: SCORE: 1635.97

## 2019-02-27 NOTE — PROGRESS NOTES
"Diabetes Self-Management Education & Support    Diabetes Education Self Management & Training    SUBJECTIVE/OBJECTIVE:  Presents for: Follow-up  Accompanied by: Self  Diabetes education in the past 24mo: Yes  Focus of Visit: Monitoring, Taking Medication  Insulin Type: NovoLog, Tresiba  Diabetes type: Type 2  Disease course: (No change)  Diabetes management related comments/concerns: BG readings remain elevated  Transportation concerns: No  Other concerns:: Glasses, Cognitive impairment  Cultural Influences/Ethnic Background:  American      Diabetes Symptoms & Complications  Blurred vision: No  Fatigue: No  Neuropathy: No  Foot ulcerations: No  Polydipsia: No  Polyphagia: No  Polyuria: No  Visual change: No  Weakness: No  Weight loss: No  Slow healing wounds: No  Recent Infection(s): No  Symptom course: Stable  Weight trend: Increasing steadily  Autonomic neuropathy: No  CVA: No  Heart disease: No  Nephropathy: Yes  Peripheral neuropathy: No  Peripheral Vascular Disease: No  Retinopathy: No  Sexual dysfunction: No    Patient Problem List and Family Medical History reviewed for relevant medical history, current medical status, and diabetes risk factors.    Vitals:  /89   Pulse 91   Resp 20   Ht 1.626 m (5' 4\")   Wt 105.6 kg (232 lb 12.8 oz)   SpO2 92%   BMI 39.96 kg/m    Estimated body mass index is 39.96 kg/m  as calculated from the following:    Height as of this encounter: 1.626 m (5' 4\").    Weight as of this encounter: 105.6 kg (232 lb 12.8 oz).   Last 3 BP:   BP Readings from Last 3 Encounters:   02/27/19 127/89   02/06/19 110/80   01/23/19 135/90       History   Smoking Status     Current Every Day Smoker     Packs/day: 1.00     Years: 34.00     Types: Cigarettes     Start date: 1/1/1979   Smokeless Tobacco     Never Used       Labs:  Lab Results   Component Value Date    A1C 9.8 02/22/2019     Lab Results   Component Value Date     09/21/2018     Lab Results   Component Value Date    LDL " 109 2016     HDL Cholesterol   Date Value Ref Range Status   02/10/2017 44 40 - 60 mg/dL Final   ]  GFR Estimate   Date Value Ref Range Status   2018 44 (L) >60 mL/min/1.7m2 Final     Comment:     Non  GFR Calc     GFR Estimate If Black   Date Value Ref Range Status   2018 54 (L) >60 mL/min/1.7m2 Final     Comment:      GFR Calc     Lab Results   Component Value Date    CR 1.25 2018     No results found for: MICROALBUMIN    Healthy Eating  Healthy Eating Assessed Today: Yes  Cultural/Caodaism diet restrictions?: No  Meal planning: Smaller portions, Avoiding sweets  Meals include: Breakfast, Dinner  Beverages: Water, Coffee(Stopped drinking pop)  Has patient met with a dietitian in the past?: Yes    Being Active  Being Active Assessed Today: Yes  Exercise:: Yes  Days per week of moderate to strenuous exercise (like a brisk walk): 2  On average, minutes per day of exercise at this level: 30  Exercise Minutes per Week: 60  Barrier to exercise: Access([employer] worker does take her out for activity)    Monitoring  Monitoring Assessed Today: Yes  Did patient bring glucose meter to appointment? : Yes  Home Glucose (Sugar) Monitorin-2 times per day(Needs to be testing before supper for NovoLOG dose)  Blood glucose trend: No change  Low Glucose Range (mg/dL): 180-200  High Glucose Range (mg/dL): >200  Overall Range (mg/dL): >200        Taking Medications  Diabetes Medication(s)     Insulin       insulin aspart (NOVOLOG PEN) 100 UNIT/ML pen    Inject 18 units prior to each meal. TDD 54 units     insulin degludec (TRESIBA FLEXTOUCH) 200 UNIT/ML pen    Inject 70 Units Subcutaneous daily    Sulfonylureas       GLIPIZIDE PO    Take 10 mg by mouth 2 times daily          Taking Medication Assessed Today: Yes  Current Treatments: Insulin Injections, Oral Agent (monotherapy)  Dose schedule: pre-dinner, pre-breakfast  Given by: Patient  Injection/Infusion sites: Abdomen  Problems  taking diabetes medications regularly?: No  Diabetes medication side effects?: No  Treatment Compliance: All of the time(Reports taking NovoLOG at breakfast, lunch and supper)    Problem Solving  Problem Solving Assessed Today: Yes  Hypoglycemia Frequency: Rarely  Hypoglycemia Treatment: Glucose (tablets or gel)  Patient carries a carbohydrate source: Yes  Medical alert: No  Severe weather/disaster plan for diabetes management?: No  DKA prevention plan?: No    Hypoglycemia symptoms  Confusion: No  Dizziness or Light-Headedness: No  Headaches: No  Hunger: Yes  Mood changes: No  Nervousness/Anxiety: No  Sleepiness: No  Speech difficulty: No  Sweats: Yes  Tremors: Yes    Hypoglycemia Complications  Blackouts: No  Hospitalization: No  Nocturnal hypoglycemia: No  Required assistance: No  Required glucagon injection: No  Seizures: No    Reducing Risks  Diabetes Risks: Age over 45 years, Hyperlipidemia  CAD Risks: Diabetes Mellitus, Dyslipidemia, Hypertension, Tobacco exposure  Has dilated eye exam at least once a year?: Yes    Healthy Coping  Healthy Coping Assessed Today: Yes  Emotional response to diabetes: Acceptance  Informal Support system:: Significant other  Stage of change: MAINTENANCE (Working to maintain change, with risk of relapse)  Difficulty affording diabetes management supplies?: No  Support resources: None  Patient Activation Measure Survey Score:  SHADI Score (Last Two) 2018   SHADI Raw Score 28   Activation Score 50   SHADI Level 2       ASSESSMENT:  Readings range as follows: fastin, 235-304, pre-supper: 273, 298, 445 and after-supper: 210-300.    Insulin doses have been increased and patient states taking all insulin as ordered. However, BG readings remain the same. Slight decrease in A1C.        Patient's most recent   Lab Results   Component Value Date    A1C 9.8 2019    is not meeting goal of <8.0    INTERVENTION:   Diabetes knowledge and skills assessment:     Patient is knowledgeable in  diabetes management concepts related to: Being Active, Monitoring and Taking Medication    Patient needs further education on the following diabetes management concepts: Taking Medication    Based on learning assessment above, most appropriate setting for further diabetes education would be: Individual setting.    Education provided today on:  AADE Self-Care Behaviors:  Taking Medication: action of prescribed medication, drawing up, administering and storing injectable diabetes medications, proper site selection and rotation for injections, side effects of prescribed medications and when to take medications    Opportunities for ongoing education and support in diabetes-self management were discussed.    Pt verbalized understanding of concepts discussed and recommendations provided today.       Education Materials Provided:  No new materials provided today    PLAN:  See Patient Instructions for co-developed, patient-stated behavior change goals.  Before breakfast:  Check Blood Glucose  Inject NovoLOG 18 units (orange pen)     If you are going eat lunch:  Inject NovoLOG 18units (orange pen)     If you skip lunch, don't take the NovoLOG     Before supper:  Check Blood Glucose  Inject Tresiba 70 units daily (lime green pen) - will be changing to [employer] green pen  Inject NovoLOG 18 units (orange pen)     At 9:00 PM:  Check Blood Glucose    Will schedule next appointment with Courtney Chaudhary NP, as BG readings not changing.  AVS printed and provided to patient today. See Follow-Up section for recommended follow-up.      Time Spent: 30 minutes  Encounter Type: Individual    Any diabetes medication dose changes were made via the CDE Protocol and Collaborative Practice Agreement with the patient's primary care provider. A copy of this encounter was shared with the provider.

## 2019-02-27 NOTE — LETTER
"    2/27/2019        RE: Marly Cannon  [address]  [place] [address]        Diabetes Self-Management Education & Support    Diabetes Education Self Management & Training    SUBJECTIVE/OBJECTIVE:  Presents for: Follow-up  Accompanied by: Self  Diabetes education in the past 24mo: Yes  Focus of Visit: Monitoring, Taking Medication  Insulin Type: NovoLog, Tresiba  Diabetes type: Type 2  Disease course: (No change)  Diabetes management related comments/concerns: BG readings remain elevated  Transportation concerns: No  Other concerns:: Glasses, Cognitive impairment  Cultural Influences/Ethnic Background:  American      Diabetes Symptoms & Complications  Blurred vision: No  Fatigue: No  Neuropathy: No  Foot ulcerations: No  Polydipsia: No  Polyphagia: No  Polyuria: No  Visual change: No  Weakness: No  Weight loss: No  Slow healing wounds: No  Recent Infection(s): No  Symptom course: Stable  Weight trend: Increasing steadily  Autonomic neuropathy: No  CVA: No  Heart disease: No  Nephropathy: Yes  Peripheral neuropathy: No  Peripheral Vascular Disease: No  Retinopathy: No  Sexual dysfunction: No    Patient Problem List and Family Medical History reviewed for relevant medical history, current medical status, and diabetes risk factors.    Vitals:  /89   Pulse 91   Resp 20   Ht 1.626 m (5' 4\")   Wt 105.6 kg (232 lb 12.8 oz)   SpO2 92%   BMI 39.96 kg/m     Estimated body mass index is 39.96 kg/m  as calculated from the following:    Height as of this encounter: 1.626 m (5' 4\").    Weight as of this encounter: 105.6 kg (232 lb 12.8 oz).   Last 3 BP:   BP Readings from Last 3 Encounters:   02/27/19 127/89   02/06/19 110/80   01/23/19 135/90       History   Smoking Status     Current Every Day Smoker     Packs/day: 1.00     Years: 34.00     Types: Cigarettes     Start date: 1/1/1979   Smokeless Tobacco     Never Used       Labs:  Lab Results   Component Value Date    A1C 9.8 02/22/2019     Lab Results "   Component Value Date     2018     Lab Results   Component Value Date     2016     HDL Cholesterol   Date Value Ref Range Status   02/10/2017 44 40 - 60 mg/dL Final   ]  GFR Estimate   Date Value Ref Range Status   2018 44 (L) >60 mL/min/1.7m2 Final     Comment:     Non  GFR Calc     GFR Estimate If Black   Date Value Ref Range Status   2018 54 (L) >60 mL/min/1.7m2 Final     Comment:      GFR Calc     Lab Results   Component Value Date    CR 1.25 2018     No results found for: MICROALBUMIN    Healthy Eating  Healthy Eating Assessed Today: Yes  Cultural/Synagogue diet restrictions?: No  Meal planning: Smaller portions, Avoiding sweets  Meals include: Breakfast, Dinner  Beverages: Water, Coffee(Stopped drinking pop)  Has patient met with a dietitian in the past?: Yes    Being Active  Being Active Assessed Today: Yes  Exercise:: Yes  Days per week of moderate to strenuous exercise (like a brisk walk): 2  On average, minutes per day of exercise at this level: 30  Exercise Minutes per Week: 60  Barrier to exercise: Access([employer] worker does take her out for activity)    Monitoring  Monitoring Assessed Today: Yes  Did patient bring glucose meter to appointment? : Yes  Home Glucose (Sugar) Monitorin-2 times per day(Needs to be testing before supper for NovoLOG dose)  Blood glucose trend: No change  Low Glucose Range (mg/dL): 180-200  High Glucose Range (mg/dL): >200  Overall Range (mg/dL): >200        Taking Medications  Diabetes Medication(s)     Insulin       insulin aspart (NOVOLOG PEN) 100 UNIT/ML pen    Inject 18 units prior to each meal. TDD 54 units     insulin degludec (TRESIBA FLEXTOUCH) 200 UNIT/ML pen    Inject 70 Units Subcutaneous daily    Sulfonylureas       GLIPIZIDE PO    Take 10 mg by mouth 2 times daily          Taking Medication Assessed Today: Yes  Current Treatments: Insulin Injections, Oral Agent (monotherapy)  Dose  schedule: pre-dinner, pre-breakfast  Given by: Patient  Injection/Infusion sites: Abdomen  Problems taking diabetes medications regularly?: No  Diabetes medication side effects?: No  Treatment Compliance: All of the time(Reports taking NovoLOG at breakfast, lunch and supper)    Problem Solving  Problem Solving Assessed Today: Yes  Hypoglycemia Frequency: Rarely  Hypoglycemia Treatment: Glucose (tablets or gel)  Patient carries a carbohydrate source: Yes  Medical alert: No  Severe weather/disaster plan for diabetes management?: No  DKA prevention plan?: No    Hypoglycemia symptoms  Confusion: No  Dizziness or Light-Headedness: No  Headaches: No  Hunger: Yes  Mood changes: No  Nervousness/Anxiety: No  Sleepiness: No  Speech difficulty: No  Sweats: Yes  Tremors: Yes    Hypoglycemia Complications  Blackouts: No  Hospitalization: No  Nocturnal hypoglycemia: No  Required assistance: No  Required glucagon injection: No  Seizures: No    Reducing Risks  Diabetes Risks: Age over 45 years, Hyperlipidemia  CAD Risks: Diabetes Mellitus, Dyslipidemia, Hypertension, Tobacco exposure  Has dilated eye exam at least once a year?: Yes    Healthy Coping  Healthy Coping Assessed Today: Yes  Emotional response to diabetes: Acceptance  Informal Support system:: Significant other  Stage of change: MAINTENANCE (Working to maintain change, with risk of relapse)  Difficulty affording diabetes management supplies?: No  Support resources: None  Patient Activation Measure Survey Score:  SHADI Score (Last Two) 2018   SHADI Raw Score 28   Activation Score 50   SHADI Level 2       ASSESSMENT:  Readings range as follows: fastin, 235-304, pre-supper: 273, 298, 445 and after-supper: 210-300.    Insulin doses have been increased and patient states taking all insulin as ordered. However, BG readings remain the same. Slight decrease in A1C.        Patient's most recent   Lab Results   Component Value Date    A1C 9.8 2019    is not meeting goal  of <8.0    INTERVENTION:   Diabetes knowledge and skills assessment:     Patient is knowledgeable in diabetes management concepts related to: Being Active, Monitoring and Taking Medication    Patient needs further education on the following diabetes management concepts: Taking Medication    Based on learning assessment above, most appropriate setting for further diabetes education would be: Individual setting.    Education provided today on:  AADE Self-Care Behaviors:  Taking Medication: action of prescribed medication, drawing up, administering and storing injectable diabetes medications, proper site selection and rotation for injections, side effects of prescribed medications and when to take medications    Opportunities for ongoing education and support in diabetes-self management were discussed.    Pt verbalized understanding of concepts discussed and recommendations provided today.       Education Materials Provided:  No new materials provided today    PLAN:  See Patient Instructions for co-developed, patient-stated behavior change goals.  Before breakfast:  Check Blood Glucose  Inject NovoLOG 18 units (orange pen)     If you are going eat lunch:  Inject NovoLOG 18units (orange pen)     If you skip lunch, don't take the NovoLOG     Before supper:  Check Blood Glucose  Inject Tresiba 70 units daily (lime green pen) - will be changing to army green pen  Inject NovoLOG 18 units (orange pen)     At 9:00 PM:  Check Blood Glucose    Will schedule next appointment with Courtney Chaudhary NP, as BG readings not changing.  AVS printed and provided to patient today. See Follow-Up section for recommended follow-up.      Time Spent: 30 minutes  Encounter Type: Individual    Any diabetes medication dose changes were made via the CDE Protocol and Collaborative Practice Agreement with the patient's primary care provider. A copy of this encounter was shared with the provider.          Sincerely,        Kat Dawn RN

## 2019-02-27 NOTE — PATIENT INSTRUCTIONS
Before breakfast:  Check Blood Glucose  Inject NovoLOG 18 units (orange pen)     If you are going eat lunch:  Inject NovoLOG 18units (orange pen)     If you skip lunch, don't take the NovoLOG     Before supper:  Check Blood Glucose  Inject Tresiba 70 units daily (lime green pen) - will be changing to army green pen  Inject NovoLOG 18 units (orange pen)     At 9:00 PM:  Check Blood Glucose

## 2019-03-08 ENCOUNTER — TRANSFERRED RECORDS (OUTPATIENT)
Dept: HEALTH INFORMATION MANAGEMENT | Facility: CLINIC | Age: 54
End: 2019-03-08

## 2019-03-28 ENCOUNTER — ALLIED HEALTH/NURSE VISIT (OUTPATIENT)
Dept: EDUCATION SERVICES | Facility: OTHER | Age: 54
End: 2019-03-28
Attending: NURSE PRACTITIONER
Payer: MEDICARE

## 2019-03-28 VITALS
HEART RATE: 90 BPM | OXYGEN SATURATION: 92 % | HEIGHT: 64 IN | BODY MASS INDEX: 40.22 KG/M2 | RESPIRATION RATE: 16 BRPM | WEIGHT: 235.6 LBS | SYSTOLIC BLOOD PRESSURE: 96 MMHG | DIASTOLIC BLOOD PRESSURE: 72 MMHG

## 2019-03-28 DIAGNOSIS — Z71.6 TOBACCO ABUSE COUNSELING: ICD-10-CM

## 2019-03-28 DIAGNOSIS — Z79.4 TYPE 2 DIABETES MELLITUS WITH HYPERGLYCEMIA, WITH LONG-TERM CURRENT USE OF INSULIN (H): Primary | ICD-10-CM

## 2019-03-28 DIAGNOSIS — E11.65 TYPE 2 DIABETES MELLITUS WITH HYPERGLYCEMIA, WITH LONG-TERM CURRENT USE OF INSULIN (H): Primary | ICD-10-CM

## 2019-03-28 DIAGNOSIS — Z72.0 TOBACCO ABUSE: ICD-10-CM

## 2019-03-28 PROCEDURE — G0463 HOSPITAL OUTPT CLINIC VISIT: HCPCS | Mod: 25

## 2019-03-28 PROCEDURE — 99213 OFFICE O/P EST LOW 20 MIN: CPT | Performed by: NURSE PRACTITIONER

## 2019-03-28 PROCEDURE — G0463 HOSPITAL OUTPT CLINIC VISIT: HCPCS | Mod: 27

## 2019-03-28 PROCEDURE — 95250 CONT GLUC MNTR PHYS/QHP EQP: CPT | Performed by: NURSE PRACTITIONER

## 2019-03-28 ASSESSMENT — PAIN SCALES - GENERAL: PAINLEVEL: NO PAIN (0)

## 2019-03-28 ASSESSMENT — MIFFLIN-ST. JEOR: SCORE: 1648.67

## 2019-03-28 NOTE — PATIENT INSTRUCTIONS
- Test blood sugar ideally 4 times a day: fasting, before meals, and before bedtime.   - No hot tub while wearing sensor. You can take a shower or bath.  - Remove sensor if you need an xray, MRI or CT.   - Complete BG/Food log daily.     HOW TO QUIT SMOKING  Smoking is one of the hardest habits to break. About half of all those who have ever smoked have been able to quit, and most of those (about 70%) who still smoke want to quit. Here are some of the best ways to stop smoking.     KEEP TRYING:  It takes most smokers about 8 tries before they are finally able to fully quit. So, the more often you try and fail, the better your chance of quitting the next time! So, don't give up!    GO COLD TURKEY:  Most ex-smokers quit cold turkey. Trying to cut back gradually doesn't seem to work as well, perhaps because it continues the smoking habit. Also, it is possible to fool yourself by inhaling more while smoking fewer cigarettes. This results in the same amount of nicotine in your body!    GET SUPPORT:  Support programs can make an important difference, especially for the heavy smoker. These groups offer lectures, methods to change your behavior and peer support. Call the free national Quitline for more information. 800-QUIT-NOW (508-432-2829). Low-cost or free programs are offered by many hospitals, local chapters of the American Lung Association (379-163-7456) and the American Cancer Society (471-185-9146). Support at home is important too. Non-smokers can help by offering praise and encouragement. If the smoker fails to quit, encourage them to try again!    OVER-THE-COUNTER MEDICINES:  For those who can't quit on their own, Nicotine Replacement Therapy (NRT) may make quitting much easier. Certain aids such as the nicotine patch, gum and lozenge are available without a prescription. However, it is best to use these under the guidance of your doctor. The skin patch provides a steady supply of nicotine to the body. Nicotine  gum and lozenge gives temporary bursts of low levels of nicotine. Both methods take the edge off the craving for cigarettes. WARNING: If you feel symptoms of nicotine overdose, such as nausea, vomiting, dizziness, weakness, or fast heartbeat, stop using these and see your doctor.    PRESCRIPTION MEDICINES:  After evaluating your smoking patterns and prior attempts at quitting, your doctor may offer a prescription medicine such as bupropion (Zyban, Wellbutrin), varenicline (Chantix, Champix), a niocotine inhaler or nasal spray. Each has its unique advantage and side effects which your doctor can review with you.    HEALTH BENEFITS OF QUITTING:  The benefits of quitting start right away and keep improving the longer you go without smokin minutes: blood pressure and pulse return to normal  8 hours: oxygen levels return to normal  2 days: ability to smell and taste begins to improve as damaged nerves start to regrow  2-3 weeks: circulation and lung function improves  1-9 months: decreased cough, congestion and shortness of breath; less tired  1 year: risk of heart attack decreases by half  5 years: risk of lung cancer decreases by half; risk of stroke becomes the same as a non-smoker  For information about how to quit smoking, visit the following links:  [employer] ,   Clearing the Air, Quit Smoking Today   - an online booklet. http://www.smokefree.gov/pubs/clearing_the_air.pdf  Smokefree.gov http://smokefree.gov/  QuitNet http://www.quitnet.com/    3110-2955 Elisa [address], [place] [address]. All rights reserved. This information is not intended as a substitute for professional medical care. Always follow your healthcare professional's instructions.    The Benefits of Living Smoke Free  What do you want to gain from quitting? Check off some reasons to quit.  Health Benefits  ___ Reduce my risk of lung cancer, heart disease, chronic lung disease  ___ Have fewer wrinkles and  softer skin  ___ Improve my sense of taste and smell  ___ For pregnant women--reduce the risk of having a miscarriage, stillbirth, premature birth, or low-birth-weight baby  Personal Benefits  ___ Feel more in control of my life  ___ Have better-smelling hair, breath, clothes, home, and car  ___ Save time by not having to take smoke breaks, buy cigarettes, or hunt for a light  ___ Have whiter teeth  Family Benefits  ___ Reduce my children s respiratory tract infections  ___ Set a good example for my children  ___ Reduce my family s cancer risk  Financial Benefits  ___ Save hundreds of dollars each year that would be spent on cigarettes  ___ Save money on medical bills  ___ Save on life, health, and car insurance premiums    Those Dollars Add Up!  Cigarettes are expensive, and getting more expensive all the time. Do you realize how much money you are spending on cigarettes per year? What is the average amount you spend on a pack of cigarettes? What is the average number of packs that you smoke per day? Using your answers to these questions, fill in this formula to help you find out:  ($ _____ per pack) ×  ( _____ number of packs per day) × (365 days) =  $ _____ yearly cost of smoking  Besides tobacco, there are other costs, including extra cleaning bills and replacement costs for clothing and furniture; medical expenses for smoking-related illnesses; and higher health, life, and car insurance premiums.    Cigars and Pipes Count Too!  Cigars and pipes are also dangerous. So are smokeless (chewing) tobacco and snuff. All of these products contain nicotine, a highly addictive substance that has harmful effects on your body. Quitting smoking means giving up all tobacco products.      9399-6226 Elisa Munson, [address] [place] [address], [place] [address]. All rights reserved. This information is not intended as a substitute for professional medical care. Always follow your healthcare professional's instructions.

## 2019-03-28 NOTE — PROGRESS NOTES
"Chief Complaint   Patient presents with     Diabetes       Initial BP 96/72   Pulse 90   Resp 16   Ht 1.626 m (5' 4\")   Wt 106.9 kg (235 lb 9.6 oz)   SpO2 92%   BMI 40.44 kg/m   Estimated body mass index is 40.44 kg/m  as calculated from the following:    Height as of this encounter: 1.626 m (5' 4\").    Weight as of this encounter: 106.9 kg (235 lb 9.6 oz).  Medication Reconciliation: complete    Gege Walter LPN    "

## 2019-03-28 NOTE — PROGRESS NOTES
"SUBJECTIVE:  Marly Cannon, 53 year old, female presents with the following Chief Complaint(s) with HPI to follow:  Chief Complaint   Patient presents with     Diabetes     meter download; CGM study [facility]         Diabetes Follow-up      Patient is checking blood sugars: 1.2x/day.  Results:  Highest: 459  Lowest: 262  Period average: 295    Values above, >180: 17  Values within goal (): 0  Values below goal, <70: 0      Symptoms of hypoglycemia (low blood sugar): none    Paresthesias (numbness or burning in feet) or sores: No    Diabetic eye exam within the last year: Yes    Breakfast eaten regularly: once in awhile     Patient counting carbs: Yes       HPI:  Marly's here today for the follow up regarding her Diabetes mellitus, Type 2.    Lab Results   Component Value Date    A1C 9.8 02/22/2019    A1C 10.9 11/21/2018    A1C 9.6 08/21/2018    A1C 9.6 08/21/2018    A1C 7.7 05/22/2018     Current Diabetes medication:   1.  Tresiba 70 units daily (at supper)--no missed doses  2.  Glipizide 10 mg, 1 tablet in the morning and supper time  3.  Novolog per sliding  ASA use: yes, 81 mg daily  Statin use: yes, simvastatin 20 mg daily    Marly's here today for a meter download and possible insulin adjustment.  Reports the following:   Denies any missed doses of insulin   However, when asked how long her Tresiba lasts: \"one month\".    Reports she drink both regular and diet pop    Denies any new health concerns.     Decision made to start a CGM study.         03/28/19 6185   General   Presents for Follow-up   Accompanied by Self   Diabetes education in the past 24mo Yes   Focus of Visit Monitoring;Taking Medication   Insulin Type NovoLog;Tresiba   Diabetes type Type 2   Disease course Getting harder to manage  (No change)   How confident are you filling out medical forms by yourself: A little bit   Transportation concerns No   Other concerns: Glasses;Cognitive impairment   Symptoms   Blurred vision No   Fatigue No "   Neuropathy Yes   Foot ulcerations No   Polydipsia No   Polyphagia No   Polyuria No   Visual change No   Weakness No   Weight loss No   Slow healing wounds No   Recent Infection(s) No   Symptom course Stable   Weight trend Increasing steadily   Diabetic Complications   Autonomic neuropathy No   CVA No   Heart disease No   Nephropathy No   Peripheral neuropathy Yes   Peripheral Vascular Disease No   Retinopathy No   Sexual dysfunction No        03/28/19 1413   Healthy Eating   Healthy Eating Assessed Today Yes   Cultural/Denominational diet restrictions? No   Patient on a regular basis Skips meals regularly   Meal planning Smaller portions;Avoiding sweets   Meals include Breakfast;Dinner   Beverages Water;Coffee;Soda;Diet soda  (Stopped drinking pop)   Has patient met with a dietitian in the past? Yes   Being Active   Being Active Assessed Today Yes   Exercise: Yes   Days per week of moderate to strenuous exercise (like a brisk walk) 1   On average, minutes per day of exercise at this level 60   How intense was your typical exercise?  Light (like stretching or slow walking)  [facility] [place]   Exercise Minutes per Week 60   Monitoring   Monitoring Assessed Today Yes   Did patient bring glucose meter to appointment?  Yes   Home Glucose (Sugar) Monitoring 1-2 times per day  (Needs to be testing before supper for NovoLOG dose)   Blood glucose trend Fluctuating dramtically   Low Glucose Range (mg/dL) >200   High Glucose Range (mg/dL) >200   Overall Range (mg/dL) >200   Taking Medication   Taking Medication Assessed Today Yes   Current Treatments Insulin Injections;Oral Agent (monotherapy)   Dose schedule pre-dinner;pre-breakfast   Given by Patient   Injection/Infusion sites Abdomen   Problems taking diabetes medications regularly? No  (concerns about taking insulin correctly)   Diabetes medication side effects? No   Treatment Compliance All of the time  (as reported)   Problem Solving   Problem Solving Assessed  Today Yes   Hypoglycemia Frequency Never   Patient carries a carbohydrate source Yes   Hypoglycemia symptoms   Confusion No   Dizziness or Light-Headedness No   Headaches No   Hunger No   Mood changes No   Nervousness/Anxiety No   Sleepiness No   Speech difficulty No   Sweats No   Tremors No   Hypoglycemia Complications   Blackouts No   Hospitalization No   Nocturnal hypoglycemia No   Required assistance No   Required glucagon injection No   Seizures No   Reducing Risks   Diabetes Risks Age over 45 years;Hyperlipidemia   CAD Risks Diabetes Mellitus;Dyslipidemia;Hypertension;Tobacco exposure;Obesity   Has dilated eye exam at least once a year? Yes   Healthy Coping   Healthy Coping Assessed Today Yes   Emotional response to diabetes Acceptance   Informal Support system: Significant other   Stage of change MAINTENANCE (Working to maintain change, with risk of relapse)   Difficulty affording diabetes management supplies? No   Support resources None       Patient Active Problem List   Diagnosis     Type 2 diabetes, HbA1c goal < 7% (H)     ACP (advance care planning)     Stage 3 chronic kidney disease (H)     Pulmonary emphysema (H)     Hyperparathyroidism due to renal insufficiency (H)     Morbid obesity (H)     Vitamin D deficiency     Intellectual disability     Breast fibrocystic disorder     Tobacco abuse     Microalbuminuria due to type 2 diabetes mellitus (H)       No past medical history on file.    Past Surgical History:   Procedure Laterality Date     COLONOSCOPY N/A 8/20/2015    Procedure: COLONOSCOPY;  Surgeon: Gentry Porter MD;  Location: [facility]     COLONOSCOPY N/A 9/21/2018    Procedure: COLONOSCOPY;  COLONOSCOPY WITH POLYPECTOMY;  Surgeon: Gentry Porter MD;  Location: [facility]       Family History   Problem Relation Age of Onset     Diabetes Mother      Diabetes Father      Hypertension Brother      Diabetes Sister        Social History     Tobacco Use     Smoking status: Current Every Day Smoker      Packs/day: 1.00     Years: 34.00     Pack years: 34.00     Types: Cigarettes     Start date: 1/1/1979     Smokeless tobacco: Never Used   Substance Use Topics     Alcohol use: No     Alcohol/week: 0.0 oz       Current Outpatient Medications   Medication Sig Dispense Refill     Acetaminophen (TYLENOL PO) Take 325 mg by mouth every 6 hours as needed        AMLODIPINE BESYLATE PO Take 2.5 mg by mouth daily       ammonium lactate (LAC-HYDRIN) 12 % cream Apply topically 2 times daily       ASPIRIN EC PO Take 81 mg by mouth daily       blood glucose monitoring (NO BRAND SPECIFIED) test strip Use to test blood sugar 3 times daily or as directed. 300 strip 3     Cholecalciferol (VITAMIN D-3 PO) Take 2,000 Units by mouth daily       Cyclobenzaprine HCl (FLEXERIL PO) Take 10 mg by mouth 3 times daily as needed for muscle spasms       GLIPIZIDE PO Take 10 mg by mouth 2 times daily       HYDROCHLOROTHIAZIDE PO Take 12.5 mg by mouth daily        hydrocortisone 2.5 % cream Apply topically 2 times daily       insulin aspart (NOVOLOG PEN) 100 UNIT/ML pen Inject 18 units prior to each meal. TDD 54 units 15 mL 3     insulin degludec (TRESIBA FLEXTOUCH) 200 UNIT/ML pen Inject 70 Units Subcutaneous daily 18 mL 3     insulin pen needle (32G X 4 MM) 32G X 4 MM miscellaneous Use 1 pen needles daily 100 each 3     Ipratropium-Albuterol (COMBIVENT RESPIMAT)  MCG/ACT inhaler Inhale 1 puff into the lungs 4 times daily       LISINOPRIL PO Take 40 mg by mouth daily       ONETOUCH DELICA LANCETS 33G MISC 1 each 3 times daily 100 each 10     PRIMIDONE PO Take by mouth At Bedtime       SIMVASTATIN PO Take 20 mg by mouth At Bedtime        triamcinolone (KENALOG) 0.1 % cream Apply topically 2 times daily         No Known Allergies    REVIEW OF SYSTEMS  Skin: negative  Eyes: negative  Ears/Nose/Throat: negative  Respiratory: No shortness of breath, dyspnea on exertion, cough, or hemoptysis  Cardiovascular: negative  Gastrointestinal:  "negative  Genitourinary: negative  Musculoskeletal: negative  Neurologic: positive for numbness or tingling of feet  Psychiatric: negative  Hematologic/Lymphatic/Immunologic: negative  Endocrine: positive for diabetes    OBJECTIVE:  BP 96/72   Pulse 90   Resp 16   Ht 1.626 m (5' 4\")   Wt 106.9 kg (235 lb 9.6 oz)   SpO2 92%   BMI 40.44 kg/m    Constitutional: alert and no distress  Cardiovascular: RRR. No murmurs, clicks gallops or rub  Respiratory:  Good diaphragmatic excursion. Lungs clear  Psychiatric: mentation appears normal for baseline and affect normal/bright    LABS  Results for orders placed or performed in visit on 03/15/19   Creatinine random urine   Result Value Ref Range    Microalbumin/Cr Ratio Urine 141 (H) 0 - 29    Creatinine Random Urine 233 mg/dl    Microalbumin Urine (External) 32.9 mg/dl       ASSESSMENT / PLAN:  (E11.65,  Z79.4) Type 2 diabetes mellitus with hyperglycemia, with long-term current use of insulin (H)  (primary encounter diagnosis)  Comment:   Asked Marly to dial up her dose.    She grabbed the Tresiba pen and asked me \"how much\".    I told her 70 units and she did dial to 70 units.   States she's counting to 10 after injecting.    Tells me that she's changing her needles every time    Again, tells me she's taking her Novolog BEFORE meals.      Plan: GLUCOSE MONITORING CONTINUOUS, >=72 HR          I need more information.   Things are connecting    Anna MUNOZ LPN placed CGM.    Follow up weekly x 2.      (Z72.0) Tobacco abuse  Comment: noted and refused  Plan: Tobacco Cessation - Order to Satisfy Health         Maintenance            (Z71.6) Tobacco abuse counseling  Comment: noted and refused  Plan: Tobacco Cessation - Order to Satisfy Health         Maintenance          Marly's aware to let us know when she's ready to quit smoking.  Discussed the dangers of smoking with diabetes      Patient Instructions   - Test blood sugar ideally 4 times a day: fasting, before meals, and " before bedtime.   - No hot tub while wearing sensor. You can take a shower or bath.  - Remove sensor if you need an xray, MRI or CT.   - Complete BG/Food log daily.     HOW TO QUIT SMOKING  Smoking is one of the hardest habits to break. About half of all those who have ever smoked have been able to quit, and most of those (about 70%) who still smoke want to quit. Here are some of the best ways to stop smoking.     KEEP TRYING:  It takes most smokers about 8 tries before they are finally able to fully quit. So, the more often you try and fail, the better your chance of quitting the next time! So, don't give up!    GO COLD TURKEY:  Most ex-smokers quit cold turkey. Trying to cut back gradually doesn't seem to work as well, perhaps because it continues the smoking habit. Also, it is possible to fool yourself by inhaling more while smoking fewer cigarettes. This results in the same amount of nicotine in your body!    GET SUPPORT:  Support programs can make an important difference, especially for the heavy smoker. These groups offer lectures, methods to change your behavior and peer support. Call the free national Quitline for more information. 800-QUIT-NOW (292-081-7525). Low-cost or free programs are offered by many hospitals, local chapters of the American Lung Association (875-635-5361) and the American Cancer Society (991-465-7217). Support at home is important too. Non-smokers can help by offering praise and encouragement. If the smoker fails to quit, encourage them to try again!    OVER-THE-COUNTER MEDICINES:  For those who can't quit on their own, Nicotine Replacement Therapy (NRT) may make quitting much easier. Certain aids such as the nicotine patch, gum and lozenge are available without a prescription. However, it is best to use these under the guidance of your doctor. The skin patch provides a steady supply of nicotine to the body. Nicotine gum and lozenge gives temporary bursts of low levels of nicotine.  Both methods take the edge off the craving for cigarettes. WARNING: If you feel symptoms of nicotine overdose, such as nausea, vomiting, dizziness, weakness, or fast heartbeat, stop using these and see your doctor.    PRESCRIPTION MEDICINES:  After evaluating your smoking patterns and prior attempts at quitting, your doctor may offer a prescription medicine such as bupropion (Zyban, Wellbutrin), varenicline (Chantix, Champix), a niocotine inhaler or nasal spray. Each has its unique advantage and side effects which your doctor can review with you.    HEALTH BENEFITS OF QUITTING:  The benefits of quitting start right away and keep improving the longer you go without smokin minutes: blood pressure and pulse return to normal  8 hours: oxygen levels return to normal  2 days: ability to smell and taste begins to improve as damaged nerves start to regrow  2-3 weeks: circulation and lung function improves  1-9 months: decreased cough, congestion and shortness of breath; less tired  1 year: risk of heart attack decreases by half  5 years: risk of lung cancer decreases by half; risk of stroke becomes the same as a non-smoker  For information about how to quit smoking, visit the following links:  National Cancer [place] ,   Clearing the Air, Quit Smoking Today   - an online booklet. http://www.smokefree.gov/pubs/clearing_the_air.pdf  Smokefree.gov http://smokefree.gov/  QuitNet http://www.quitnet.com/    3698-7863 Elisa \[facility]\"", [address] [place] [address]. All rights reserved. This information is not intended as a substitute for professional medical care. Always follow your healthcare professional's instructions.    The Benefits of Living Smoke Free  What do you want to gain from quitting? Check off some reasons to quit.  Health Benefits  ___ Reduce my risk of lung cancer, heart disease, chronic lung disease  ___ Have fewer wrinkles and softer skin  ___ Improve my sense of taste and smell  ___ For  pregnant women--reduce the risk of having a miscarriage, stillbirth, premature birth, or low-birth-weight baby  Personal Benefits  ___ Feel more in control of my life  ___ Have better-smelling hair, breath, clothes, home, and car  ___ Save time by not having to take smoke breaks, buy cigarettes, or hunt for a light  ___ Have whiter teeth  Family Benefits  ___ Reduce my children s respiratory tract infections  ___ Set a good example for my children  ___ Reduce my family s cancer risk  Financial Benefits  ___ Save hundreds of dollars each year that would be spent on cigarettes  ___ Save money on medical bills  ___ Save on life, health, and car insurance premiums    Those Dollars Add Up!  Cigarettes are expensive, and getting more expensive all the time. Do you realize how much money you are spending on cigarettes per year? What is the average amount you spend on a pack of cigarettes? What is the average number of packs that you smoke per day? Using your answers to these questions, fill in this formula to help you find out:  ($ _____ per pack) ×  ( _____ number of packs per day) × (365 days) =  $ _____ yearly cost of smoking  Besides tobacco, there are other costs, including extra cleaning bills and replacement costs for clothing and furniture; medical expenses for smoking-related illnesses; and higher health, life, and car insurance premiums.    Cigars and Pipes Count Too!  Cigars and pipes are also dangerous. So are smokeless (chewing) tobacco and snuff. All of these products contain nicotine, a highly addictive substance that has harmful effects on your body. Quitting smoking means giving up all tobacco products.      4945-3076 Elisa [facility], [address] [place] [address], [place] [address]. All rights reserved. This information is not intended as a substitute for professional medical care. Always follow your healthcare professional's instructions.      Time: 40 minutes  Barrier: see PMH  Willingness to learn:  accepting    Courtney PINTO [facility]  Disease Management    Cc: Amberly Whyte NP      With the electronic record, we can now more quickly and easily track our patient diabetic goals. Our diabetes clinical review is in progress and these are the indicators we are monitoring for good diabetes health.     1.) HbA1C less than 7 (measurement of your average blood sugars)  2.) Blood Pressure less than 140/80  3.) LDL less than 100 (bad cholesterol)  4.) HbA1C is checked in the last 6 months and below 7% (more frequently if not at goal or adjusting medications)  5.) LDL is checked in the last 12 months (more frequently if not at goal or adjusting medications)  6.) Taking one baby aspirin daily (unless otherwise instructed)  7.) No tobacco use  8) Statin use     You have achieved 6 out of 8 of these and I am encouraging you to come in and get tuned up to achieve 8 out of 8!  Here is what you have achieved so far in my goals for you:  1.) HbA1C  less than 7:                              NO  Your last  HbA1C :  Lab Results   Component Value Date    A1C 9.8 02/22/2019   .  2.) Blood Pressure less than 140/80:       YES      Your last    BP Readings from Last 1 Encounters:   03/28/19 96/72     3.) LDL less than 100:                              YES      Your last     Lab Results   Component Value Date    LDL 86 02/22/2019     4.) Checked HbA1C in the past 6 months: YES      5.) Checked LDL in the past 12 months:    YES      6.) Taking one aspirin daily:                       YES     7.) No tobacco use:                                        NO   8.) Statin use      YES

## 2019-03-29 ENCOUNTER — TRANSFERRED RECORDS (OUTPATIENT)
Dept: HEALTH INFORMATION MANAGEMENT | Facility: HOSPITAL | Age: 54
End: 2019-03-29

## 2019-03-29 LAB
ALT SERPL-CCNC: 10 IU/L (ref 6–31)
AST SERPL-CCNC: 12 IU/L (ref 10–40)
CHOLEST SERPL-MCNC: 144 MG/DL (ref 114–200)
HDLC SERPL-MCNC: 36 MG/DL (ref 40–60)
LDLC SERPL CALC-MCNC: 72 MG/DL
TRIGL SERPL-MCNC: 181 MG/DL (ref 10–200)

## 2019-04-04 ENCOUNTER — ALLIED HEALTH/NURSE VISIT (OUTPATIENT)
Dept: EDUCATION SERVICES | Facility: OTHER | Age: 54
End: 2019-04-04
Attending: NURSE PRACTITIONER
Payer: MEDICARE

## 2019-04-04 VITALS
SYSTOLIC BLOOD PRESSURE: 127 MMHG | RESPIRATION RATE: 20 BRPM | HEIGHT: 64 IN | DIASTOLIC BLOOD PRESSURE: 88 MMHG | HEART RATE: 92 BPM | BODY MASS INDEX: 39.69 KG/M2 | WEIGHT: 232.5 LBS | OXYGEN SATURATION: 88 %

## 2019-04-04 DIAGNOSIS — E11.65 TYPE 2 DIABETES MELLITUS WITH HYPERGLYCEMIA, WITH LONG-TERM CURRENT USE OF INSULIN (H): Primary | ICD-10-CM

## 2019-04-04 DIAGNOSIS — Z71.6 TOBACCO ABUSE COUNSELING: ICD-10-CM

## 2019-04-04 DIAGNOSIS — Z72.0 TOBACCO ABUSE: ICD-10-CM

## 2019-04-04 DIAGNOSIS — Z79.4 TYPE 2 DIABETES MELLITUS WITH HYPERGLYCEMIA, WITH LONG-TERM CURRENT USE OF INSULIN (H): Primary | ICD-10-CM

## 2019-04-04 PROCEDURE — 95251 CONT GLUC MNTR ANALYSIS I&R: CPT | Performed by: NURSE PRACTITIONER

## 2019-04-04 PROCEDURE — G0463 HOSPITAL OUTPT CLINIC VISIT: HCPCS | Mod: 25

## 2019-04-04 PROCEDURE — 99213 OFFICE O/P EST LOW 20 MIN: CPT | Mod: 25 | Performed by: NURSE PRACTITIONER

## 2019-04-04 PROCEDURE — G0463 HOSPITAL OUTPT CLINIC VISIT: HCPCS

## 2019-04-04 ASSESSMENT — MIFFLIN-ST. JEOR: SCORE: 1634.61

## 2019-04-04 ASSESSMENT — PAIN SCALES - GENERAL: PAINLEVEL: NO PAIN (0)

## 2019-04-04 NOTE — PATIENT INSTRUCTIONS
Continue working on healthy eating and moving (start low and slow, work up to 30 min, 5x/week)    BG goals:  Fasting and before meals <130, >70  2 hour after eating <180    We only need 1/2 of these numbers to be within target then your A1c will be within target    Medication changes   Increase Tresiba to 75 units    Follow up   One week    Call me sooner if any problems/concerns and/or questions develop including consistent low BGs <70 or consistent high BGs >200  [phone] (Unit Coordinator)    [phone] (Nurse)     HOW TO QUIT SMOKING  Smoking is one of the hardest habits to break. About half of all those who have ever smoked have been able to quit, and most of those (about 70%) who still smoke want to quit. Here are some of the best ways to stop smoking.     KEEP TRYING:  It takes most smokers about 8 tries before they are finally able to fully quit. So, the more often you try and fail, the better your chance of quitting the next time! So, don't give up!    GO COLD TURKEY:  Most ex-smokers quit cold turkey. Trying to cut back gradually doesn't seem to work as well, perhaps because it continues the smoking habit. Also, it is possible to fool yourself by inhaling more while smoking fewer cigarettes. This results in the same amount of nicotine in your body!    GET SUPPORT:  Support programs can make an important difference, especially for the heavy smoker. These groups offer lectures, methods to change your behavior and peer support. Call the free national Quitline for more information. 800-QUIT-NOW (655-064-3942). Low-cost or free programs are offered by many hospitals, local chapters of the American Lung Association (673-823-6577) and the American Cancer Society (617-845-7247). Support at home is important too. Non-smokers can help by offering praise and encouragement. If the smoker fails to quit, encourage them to try again!    OVER-THE-COUNTER MEDICINES:  For those who can't quit on their own, Nicotine  Replacement Therapy (NRT) may make quitting much easier. Certain aids such as the nicotine patch, gum and lozenge are available without a prescription. However, it is best to use these under the guidance of your doctor. The skin patch provides a steady supply of nicotine to the body. Nicotine gum and lozenge gives temporary bursts of low levels of nicotine. Both methods take the edge off the craving for cigarettes. WARNING: If you feel symptoms of nicotine overdose, such as nausea, vomiting, dizziness, weakness, or fast heartbeat, stop using these and see your doctor.    PRESCRIPTION MEDICINES:  After evaluating your smoking patterns and prior attempts at quitting, your doctor may offer a prescription medicine such as bupropion (Zyban, Wellbutrin), varenicline (Chantix, Champix), a niocotine inhaler or nasal spray. Each has its unique advantage and side effects which your doctor can review with you.    HEALTH BENEFITS OF QUITTING:  The benefits of quitting start right away and keep improving the longer you go without smokin minutes: blood pressure and pulse return to normal  8 hours: oxygen levels return to normal  2 days: ability to smell and taste begins to improve as damaged nerves start to regrow  2-3 weeks: circulation and lung function improves  1-9 months: decreased cough, congestion and shortness of breath; less tired  1 year: risk of heart attack decreases by half  5 years: risk of lung cancer decreases by half; risk of stroke becomes the same as a non-smoker  For information about how to quit smoking, visit the following links:  [employer] [place] ,   Clearing the Air, Quit Smoking Today   - an online booklet. http://www.smokefree.gov/pubs/clearing_the_air.pdf  Smokefree.gov http://smokefree.gov/  QuitNet http://www.quitnet.com/    2491-9191 Elisa [employer], [address] [place] [address], [place] [address]. All rights reserved. This information is not intended as a substitute for professional medical  care. Always follow your healthcare professional's instructions.    The Benefits of Living Smoke Free  What do you want to gain from quitting? Check off some reasons to quit.  Health Benefits  ___ Reduce my risk of lung cancer, heart disease, chronic lung disease  ___ Have fewer wrinkles and softer skin  ___ Improve my sense of taste and smell  ___ For pregnant women--reduce the risk of having a miscarriage, stillbirth, premature birth, or low-birth-weight baby  Personal Benefits  ___ Feel more in control of my life  ___ Have better-smelling hair, breath, clothes, home, and car  ___ Save time by not having to take smoke breaks, buy cigarettes, or hunt for a light  ___ Have whiter teeth  Family Benefits  ___ Reduce my children s respiratory tract infections  ___ Set a good example for my children  ___ Reduce my family s cancer risk  Financial Benefits  ___ Save hundreds of dollars each year that would be spent on cigarettes  ___ Save money on medical bills  ___ Save on life, health, and car insurance premiums    Those Dollars Add Up!  Cigarettes are expensive, and getting more expensive all the time. Do you realize how much money you are spending on cigarettes per year? What is the average amount you spend on a pack of cigarettes? What is the average number of packs that you smoke per day? Using your answers to these questions, fill in this formula to help you find out:  ($ _____ per pack) ×  ( _____ number of packs per day) × (365 days) =  $ _____ yearly cost of smoking  Besides tobacco, there are other costs, including extra cleaning bills and replacement costs for clothing and furniture; medical expenses for smoking-related illnesses; and higher health, life, and car insurance premiums.    Cigars and Pipes Count Too!  Cigars and pipes are also dangerous. So are smokeless (chewing) tobacco and snuff. All of these products contain nicotine, a highly addictive substance that has harmful effects on your body. Quitting  smoking means giving up all tobacco products.      6516-4163 Elisa [employer], [address] [place] [address], [place] [address]. All rights reserved. This information is not intended as a substitute for professional medical care. Always follow your healthcare professional's instructions.

## 2019-04-04 NOTE — PROGRESS NOTES
SUBJECTIVE:  Marly Cannon, 53 year old, female presents with the following Chief Complaint(s) with HPI to follow:  Chief Complaint   Patient presents with     Diabetes     CGM 1st evaluation        Diabetes Follow-up      Patient is checking blood sugars: 1.4x/day.  Results:  Highest: 522  Lowest: 262  Period average: 317    Values above, >180: 20  Values within goal (): 0  Values below goal, <70: 0      Symptoms of hypoglycemia (low blood sugar): none    Paresthesias (numbness or burning in feet) or sores: No    Diabetic eye exam within the last year: Yes    Breakfast eaten regularly: once in awhile     Patient counting carbs: Yes       HPI:  Marly's here today for the follow up regarding her Diabetes mellitus, Type 2.    Lab Results   Component Value Date    A1C 9.8 02/22/2019    A1C 10.9 11/21/2018    A1C 9.6 08/21/2018    A1C 9.6 08/21/2018    A1C 7.7 05/22/2018     Current Diabetes medication:   1.  Tresiba 70 units daily (before supper)--no missed doses  2.  Glipizide 10 mg, 1 tablet in the morning and supper time  3.  Novolog per sliding  ASA use: yes, 81 mg daily  Statin use: yes, simvastatin 20 mg daily    Marly's here today for her 1st CGM study download and evaluation. Reports the following:   Denies any missed doses of insulin.   Denies any new health concerns.        04/04/19 1002   General   Presents for Follow-up   Accompanied by Self   Diabetes education in the past 24mo Yes   Focus of Visit Monitoring;Taking Medication;Problem Solving   Insulin Type NovoLog;Tresiba   Diabetes type Type 2   Disease course Getting harder to manage  (No change)   How confident are you filling out medical forms by yourself: A little bit   Transportation concerns No   Other concerns: Glasses;Cognitive impairment   Symptoms   Blurred vision No   Fatigue No   Neuropathy Yes   Foot ulcerations No   Polydipsia No   Polyphagia No   Polyuria No   Visual change No   Weakness No   Weight loss No   Slow healing  wounds No   Recent Infection(s) No   Symptom course Stable   Weight trend Increasing steadily   Diabetic Complications   Autonomic neuropathy No   CVA No   Heart disease No   Nephropathy No   Peripheral neuropathy Yes   Peripheral Vascular Disease No   Retinopathy No   Sexual dysfunction No        04/04/19 1002   Healthy Eating   Healthy Eating Assessed Today Yes   Cultural/Mu-ism diet restrictions? No   Patient on a regular basis Has an inconsistent intake of carbohydrates   Meals include Breakfast;Dinner   Beverages Water;Coffee;Diet soda  (Stopped drinking pop)   Has patient met with a dietitian in the past? Yes   Being Active   Being Active Assessed Today Yes   Exercise: Yes   Days per week of moderate to strenuous exercise (like a brisk walk) 2   On average, minutes per day of exercise at this level 30   How intense was your typical exercise?  Light (like stretching or slow walking)   Exercise Minutes per Week 60   Monitoring   Monitoring Assessed Today Yes   Did patient bring glucose meter to appointment?  Yes   Blood Glucose Meter One Touch   Home Glucose (Sugar) Monitoring 1-2 times per day  (Needs to be testing before supper for NovoLOG dose)   Blood glucose trend Fluctuating dramtically   Low Glucose Range (mg/dL) >200   High Glucose Range (mg/dL) >200   Overall Range (mg/dL) >200   Taking Medication   Taking Medication Assessed Today Yes   Current Treatments Insulin Injections;Oral Agent (monotherapy)   Dose schedule pre-dinner;pre-breakfast   Given by Patient   Injection/Infusion sites Abdomen   Problems taking diabetes medications regularly? No  (concerns about taking insulin correctly)   Diabetes medication side effects? No   Treatment Compliance All of the time  (as reported)   Problem Solving   Problem Solving Assessed Today Yes   Hypoglycemia Frequency Never   Patient carries a carbohydrate source Yes   Hypoglycemia symptoms   Confusion No   Dizziness or Light-Headedness No   Headaches No    Hunger No   Mood changes No   Nervousness/Anxiety No   Sleepiness No   Speech difficulty No   Sweats No   Tremors No   Hypoglycemia Complications   Seizures No   Blackouts No   Hospitalization No   Nocturnal hypoglycemia No   Required assistance No   Required glucagon injection No   Reducing Risks   Diabetes Risks Age over 45 years;Hyperlipidemia   CAD Risks Diabetes Mellitus;Dyslipidemia;Hypertension;Tobacco exposure;Obesity   Has dilated eye exam at least once a year? Yes   Healthy Coping   Healthy Coping Assessed Today Yes   Emotional response to diabetes Acceptance   Informal Support system: Significant other   Stage of change MAINTENANCE (Working to maintain change, with risk of relapse)   Difficulty affording diabetes management supplies? No   Support resources None        04/04/19 1002   Insulin Pump Review   Problems taking diabetes medications regularly? No  (concerns about taking insulin correctly)   Diabetes medication side effects? No   Professional CGM Insertion   Sensor Type LibrePro   Indication(s) for CGM Study Difficult to manage hypoglycemia and/or hyperglycemia   Statistics/Data Evaluation   Consistent day-to-day patterns Pattern of daytime hyperglycemia;Pattern of nocturnal hyperglycemia       Patient Active Problem List   Diagnosis     Type 2 diabetes, HbA1c goal < 7% (H)     ACP (advance care planning)     Stage 3 chronic kidney disease (H)     Pulmonary emphysema (H)     Hyperparathyroidism due to renal insufficiency (H)     Morbid obesity (H)     Vitamin D deficiency     Intellectual disability     Breast fibrocystic disorder     Tobacco abuse     Microalbuminuria due to type 2 diabetes mellitus (H)       No past medical history on file.    Past Surgical History:   Procedure Laterality Date     COLONOSCOPY N/A 8/20/2015    Procedure: COLONOSCOPY;  Surgeon: Gentry Porter MD;  Location: [facility]     COLONOSCOPY N/A 9/21/2018    Procedure: COLONOSCOPY;  COLONOSCOPY WITH POLYPECTOMY;   Surgeon: Gentry Porter MD;  Location: [facility]       Family History   Problem Relation Age of Onset     Diabetes Mother      Diabetes Father      Hypertension Brother      Diabetes Sister        Social History     Tobacco Use     Smoking status: Current Every Day Smoker     Packs/day: 1.00     Years: 34.00     Pack years: 34.00     Types: Cigarettes     Start date: 1/1/1979     Smokeless tobacco: Never Used   Substance Use Topics     Alcohol use: No     Alcohol/week: 0.0 oz       Current Outpatient Medications   Medication Sig Dispense Refill     Acetaminophen (TYLENOL PO) Take 325 mg by mouth every 6 hours as needed        AMLODIPINE BESYLATE PO Take 2.5 mg by mouth daily       ammonium lactate (LAC-HYDRIN) 12 % cream Apply topically 2 times daily       ASPIRIN EC PO Take 81 mg by mouth daily       blood glucose monitoring (NO BRAND SPECIFIED) test strip Use to test blood sugar 3 times daily or as directed. 300 strip 3     Cholecalciferol (VITAMIN D-3 PO) Take 2,000 Units by mouth daily       Cyclobenzaprine HCl (FLEXERIL PO) Take 10 mg by mouth 3 times daily as needed for muscle spasms       GLIPIZIDE PO Take 10 mg by mouth 2 times daily       HYDROCHLOROTHIAZIDE PO Take 12.5 mg by mouth daily        hydrocortisone 2.5 % cream Apply topically 2 times daily       insulin aspart (NOVOLOG PEN) 100 UNIT/ML pen Inject 18 units prior to each meal. TDD 54 units 15 mL 3     insulin degludec (TRESIBA FLEXTOUCH) 200 UNIT/ML pen Inject 70 Units Subcutaneous daily 18 mL 3     insulin pen needle (32G X 4 MM) 32G X 4 MM miscellaneous Use 1 pen needles daily 100 each 3     Ipratropium-Albuterol (COMBIVENT RESPIMAT)  MCG/ACT inhaler Inhale 1 puff into the lungs 4 times daily       LISINOPRIL PO Take 40 mg by mouth daily       ONETOUCH DELICA LANCETS 33G MISC 1 each 3 times daily 100 each 10     PRIMIDONE PO Take by mouth At Bedtime       SIMVASTATIN PO Take 20 mg by mouth At Bedtime        triamcinolone (KENALOG) 0.1 %  "cream Apply topically 2 times daily         No Known Allergies    REVIEW OF SYSTEMS  Skin: negative  Eyes: negative  Ears/Nose/Throat: negative  Respiratory: No shortness of breath, dyspnea on exertion, cough, or hemoptysis  Cardiovascular: negative  Gastrointestinal: negative  Genitourinary: negative  Musculoskeletal: negative  Neurologic: positive for numbness or tingling of feet-better today  Psychiatric: negative  Hematologic/Lymphatic/Immunologic: negative  Endocrine: positive for diabetes    OBJECTIVE:  /88   Pulse 92   Resp 20   Ht 1.626 m (5' 4\")   Wt 105.5 kg (232 lb 8 oz)   SpO2 (!) 88%   BMI 39.91 kg/m    Constitutional: alert and no distress  Cardiovascular: RRR. No murmurs, clicks gallops or rub  Respiratory:  Good diaphragmatic excursion. Lungs clear except RLL inspiratory wheeze  Psychiatric: mentation appears normal for baseline and affect normal/bright    LABS  Results for orders placed or performed in visit on 03/15/19   Creatinine random urine   Result Value Ref Range    Microalbumin/Cr Ratio Urine 141 (H) 0 - 29    Creatinine Random Urine 233 mg/dl    Microalbumin Urine (External) 32.9 mg/dl       ASSESSMENT / PLAN:  (E11.65,  Z79.4) Type 2 diabetes mellitus with hyperglycemia, with long-term current use of insulin (H)  (primary encounter diagnosis)  Comment:   Findings:  Ave. SG: 320      Date: 3/28/19    Ave. Glucose: 329   Time in target: 0%  Time below target: 0%  Time above target: 100%    Date: 3/29    Ave. Glucose: 314   Time in target: 0%  Time below target: 0%  Time above target: 100%    Date: 3/30    Ave. Glucose: 356   Time in target: 0%  Time below target: 0%  Time above target: 100%    Date: 3/31    Ave. Glucose: 325   Time in target: 0%  Time below target: 0%  Time above target: 100%    Date: 4/1    Ave. Glucose: 318   Time in target: 0%  Time below target: 0%  Time above target: 100%    Date: 4/2    Ave. Glucose: 319   Time in target: 0%  Time below target: 0%  Time " above target: 100%    Date: 4/3    Ave. Glucose: 293   Time in target: 0%  Time below target: 0%  Time above target: 100%    Date: 4/4    Ave. Glucose: 288   Time in target: 0%  Time below target: 0%  Time above target: 100%    Distribution Data:  Percentage above 180: 100%  Percentage within : 0%  Percentage below 70: 0%    Hypoglycemia incidence:  none    Food choices/Carbohydrate counting:  Didn't keep food logs    Exercise:  walks    Recommendations:  Up Tresiba    Plan: GLUCOSE MONITOR, 72 HOUR, PHYS INTERP          Increase Tresiba to 75 units daily    Had Marly dial up dose of demo Tresiba pen--she did it correctly    (Z72.0) Tobacco abuse  Comment: note and refused  Plan: Tobacco Cessation - Order to Satisfy Health         Maintenance          Marly's aware to let us know when she's ready to quit smoking.  Discussed the dangers of smoking with diabetes      (Z71.6) Tobacco abuse counseling  Comment: noted and refused  Plan: as mentioned above      Patient Instructions   Continue working on healthy eating and moving (start low and slow, work up to 30 min, 5x/week)    BG goals:  Fasting and before meals <130, >70  2 hour after eating <180    We only need 1/2 of these numbers to be within target then your A1c will be within target    Medication changes   Increase Tresiba to 75 units    Follow up   One week    Call me sooner if any problems/concerns and/or questions develop including consistent low BGs <70 or consistent high BGs >200  [phone] (Unit Coordinator)    [phone] (Nurse)     HOW TO QUIT SMOKING  Smoking is one of the hardest habits to break. About half of all those who have ever smoked have been able to quit, and most of those (about 70%) who still smoke want to quit. Here are some of the best ways to stop smoking.     KEEP TRYING:  It takes most smokers about 8 tries before they are finally able to fully quit. So, the more often you try and fail, the better your chance of quitting  the next time! So, don't give up!    GO COLD TURKEY:  Most ex-smokers quit cold turkey. Trying to cut back gradually doesn't seem to work as well, perhaps because it continues the smoking habit. Also, it is possible to fool yourself by inhaling more while smoking fewer cigarettes. This results in the same amount of nicotine in your body!    GET SUPPORT:  Support programs can make an important difference, especially for the heavy smoker. These groups offer lectures, methods to change your behavior and peer support. Call the free national Quitline for more information. 800-QUIT-NOW (479-427-1141). Low-cost or free programs are offered by many hospitals, local chapters of the American Lung Association (322-912-6816) and the American Cancer Society (597-420-6686). Support at home is important too. Non-smokers can help by offering praise and encouragement. If the smoker fails to quit, encourage them to try again!    OVER-THE-COUNTER MEDICINES:  For those who can't quit on their own, Nicotine Replacement Therapy (NRT) may make quitting much easier. Certain aids such as the nicotine patch, gum and lozenge are available without a prescription. However, it is best to use these under the guidance of your doctor. The skin patch provides a steady supply of nicotine to the body. Nicotine gum and lozenge gives temporary bursts of low levels of nicotine. Both methods take the edge off the craving for cigarettes. WARNING: If you feel symptoms of nicotine overdose, such as nausea, vomiting, dizziness, weakness, or fast heartbeat, stop using these and see your doctor.    PRESCRIPTION MEDICINES:  After evaluating your smoking patterns and prior attempts at quitting, your doctor may offer a prescription medicine such as bupropion (Zyban, Wellbutrin), varenicline (Chantix, Champix), a niocotine inhaler or nasal spray. Each has its unique advantage and side effects which your doctor can review with you.    HEALTH BENEFITS OF  QUITTING:  The benefits of quitting start right away and keep improving the longer you go without smokin minutes: blood pressure and pulse return to normal  8 hours: oxygen levels return to normal  2 days: ability to smell and taste begins to improve as damaged nerves start to regrow  2-3 weeks: circulation and lung function improves  1-9 months: decreased cough, congestion and shortness of breath; less tired  1 year: risk of heart attack decreases by half  5 years: risk of lung cancer decreases by half; risk of stroke becomes the same as a non-smoker  For information about how to quit smoking, visit the following links:  [employer] [place] ,   Clearing the Air, Quit Smoking Today   - an online booklet. http://www.smokefree.gov/pubs/clearing_the_air.pdf  Smokefree.gov http://smokefree.gov/  QuitNet http://www.quitnet.com/    7680-0147 [employer] [facility], [address] [place] [address]. All rights reserved. This information is not intended as a substitute for professional medical care. Always follow your healthcare professional's instructions.    The Benefits of Living Smoke Free  What do you want to gain from quitting? Check off some reasons to quit.  Health Benefits  ___ Reduce my risk of lung cancer, heart disease, chronic lung disease  ___ Have fewer wrinkles and softer skin  ___ Improve my sense of taste and smell  ___ For pregnant women--reduce the risk of having a miscarriage, stillbirth, premature birth, or low-birth-weight baby  Personal Benefits  ___ Feel more in control of my life  ___ Have better-smelling hair, breath, clothes, home, and car  ___ Save time by not having to take smoke breaks, buy cigarettes, or hunt for a light  ___ Have whiter teeth  Family Benefits  ___ Reduce my children s respiratory tract infections  ___ Set a good example for my children  ___ Reduce my family s cancer risk  Financial Benefits  ___ Save hundreds of dollars each year that would be spent on  cigarettes  ___ Save money on medical bills  ___ Save on life, health, and car insurance premiums    Those Dollars Add Up!  Cigarettes are expensive, and getting more expensive all the time. Do you realize how much money you are spending on cigarettes per year? What is the average amount you spend on a pack of cigarettes? What is the average number of packs that you smoke per day? Using your answers to these questions, fill in this formula to help you find out:  ($ _____ per pack) ×  ( _____ number of packs per day) × (365 days) =  $ _____ yearly cost of smoking  Besides tobacco, there are other costs, including extra cleaning bills and replacement costs for clothing and furniture; medical expenses for smoking-related illnesses; and higher health, life, and car insurance premiums.    Cigars and Pipes Count Too!  Cigars and pipes are also dangerous. So are smokeless (chewing) tobacco and snuff. All of these products contain nicotine, a highly addictive substance that has harmful effects on your body. Quitting smoking means giving up all tobacco products.      1426-5089 [place] [address]. All rights reserved. This information is not intended as a substitute for professional medical care. Always follow your healthcare professional's instructions.      Time: 30 minutes  Barrier: see [facility]  Willingness to learn: accepting    Courtney PINTO [facility]  Disease Management    Cc: Amberly Whyte NP      With the electronic record, we can now more quickly and easily track our patient diabetic goals. Our diabetes clinical review is in progress and these are the indicators we are monitoring for good diabetes health.     1.) HbA1C less than 7 (measurement of your average blood sugars)  2.) Blood Pressure less than 140/80  3.) LDL less than 100 (bad cholesterol)  4.) HbA1C is checked in the last 6 months and below 7% (more frequently if not at goal or adjusting medications)  5.) LDL is  checked in the last 12 months (more frequently if not at goal or adjusting medications)  6.) Taking one baby aspirin daily (unless otherwise instructed)  7.) No tobacco use  8) Statin use     You have achieved 6 out of 8 of these and I am encouraging you to come in and get tuned up to achieve 8 out of 8!  Here is what you have achieved so far in my goals for you:  1.) HbA1C  less than 7:                              NO  Your last  HbA1C :  Lab Results   Component Value Date    A1C 9.8 02/22/2019   .  2.) Blood Pressure less than 140/80:       YES      Your last    BP Readings from Last 1 Encounters:   04/04/19 127/88     3.) LDL less than 100:                              YES      Your last     Lab Results   Component Value Date    LDL 86 02/22/2019     4.) Checked HbA1C in the past 6 months: YES      5.) Checked LDL in the past 12 months:    YES      6.) Taking one aspirin daily:                       YES     7.) No tobacco use:                                        NO   8.) Statin use      YES

## 2019-04-04 NOTE — PROGRESS NOTES
"Chief Complaint   Patient presents with     Diabetes       Initial /88   Pulse 92   Resp 20   Ht 1.626 m (5' 4\")   Wt 105.5 kg (232 lb 8 oz)   SpO2 (!) 88%   BMI 39.91 kg/m   Estimated body mass index is 39.91 kg/m  as calculated from the following:    Height as of this encounter: 1.626 m (5' 4\").    Weight as of this encounter: 105.5 kg (232 lb 8 oz).  Medication Reconciliation: complete    Gege Walter LPN    "

## 2019-04-08 ENCOUNTER — TELEPHONE (OUTPATIENT)
Dept: EDUCATION SERVICES | Facility: HOSPITAL | Age: 54
End: 2019-04-08

## 2019-04-08 NOTE — TELEPHONE ENCOUNTER
Pt's CGM fell odd on 04/05/19. This was applied on 03/28/19. Pt has an appt 04/11/19 for a CGM Follow-up with Courtney Chaudhary. Should she have a new CGM applied, or do we have enough data(9 days)?

## 2019-04-08 NOTE — TELEPHONE ENCOUNTER
Please call Marly. Ask her to save the sensor in a ziplock bag and bring it to her appointment on 4/11. Thanks!

## 2019-04-11 ENCOUNTER — ALLIED HEALTH/NURSE VISIT (OUTPATIENT)
Dept: EDUCATION SERVICES | Facility: OTHER | Age: 54
End: 2019-04-11
Attending: NURSE PRACTITIONER
Payer: MEDICARE

## 2019-04-11 VITALS
BODY MASS INDEX: 39.81 KG/M2 | WEIGHT: 233.2 LBS | DIASTOLIC BLOOD PRESSURE: 80 MMHG | SYSTOLIC BLOOD PRESSURE: 119 MMHG | HEIGHT: 64 IN | OXYGEN SATURATION: 92 % | HEART RATE: 85 BPM | RESPIRATION RATE: 16 BRPM

## 2019-04-11 DIAGNOSIS — Z71.6 TOBACCO ABUSE COUNSELING: ICD-10-CM

## 2019-04-11 DIAGNOSIS — E11.65 TYPE 2 DIABETES MELLITUS WITH HYPERGLYCEMIA, WITH LONG-TERM CURRENT USE OF INSULIN (H): Primary | ICD-10-CM

## 2019-04-11 DIAGNOSIS — Z72.0 TOBACCO ABUSE: ICD-10-CM

## 2019-04-11 DIAGNOSIS — Z79.4 TYPE 2 DIABETES MELLITUS WITH HYPERGLYCEMIA, WITH LONG-TERM CURRENT USE OF INSULIN (H): Primary | ICD-10-CM

## 2019-04-11 LAB
ANION GAP SERPL CALCULATED.3IONS-SCNC: 5 MMOL/L (ref 3–14)
BUN SERPL-MCNC: 19 MG/DL (ref 7–30)
CALCIUM SERPL-MCNC: 9.7 MG/DL (ref 8.5–10.1)
CHLORIDE SERPL-SCNC: 98 MMOL/L (ref 94–109)
CO2 SERPL-SCNC: 33 MMOL/L (ref 20–32)
CREAT SERPL-MCNC: 1.21 MG/DL (ref 0.52–1.04)
GFR SERPL CREATININE-BSD FRML MDRD: 50 ML/MIN/{1.73_M2}
GLUCOSE SERPL-MCNC: 208 MG/DL (ref 70–99)
POTASSIUM SERPL-SCNC: 3.7 MMOL/L (ref 3.4–5.3)
SODIUM SERPL-SCNC: 136 MMOL/L (ref 133–144)
TSH SERPL DL<=0.005 MIU/L-ACNC: 1.16 MU/L (ref 0.4–4)

## 2019-04-11 PROCEDURE — G0463 HOSPITAL OUTPT CLINIC VISIT: HCPCS

## 2019-04-11 PROCEDURE — 36415 COLL VENOUS BLD VENIPUNCTURE: CPT | Mod: ZL | Performed by: NURSE PRACTITIONER

## 2019-04-11 PROCEDURE — 95251 CONT GLUC MNTR ANALYSIS I&R: CPT | Performed by: NURSE PRACTITIONER

## 2019-04-11 PROCEDURE — G0463 HOSPITAL OUTPT CLINIC VISIT: HCPCS | Mod: 25

## 2019-04-11 PROCEDURE — 99213 OFFICE O/P EST LOW 20 MIN: CPT | Mod: 25 | Performed by: NURSE PRACTITIONER

## 2019-04-11 PROCEDURE — 84443 ASSAY THYROID STIM HORMONE: CPT | Mod: ZL | Performed by: NURSE PRACTITIONER

## 2019-04-11 PROCEDURE — 80048 BASIC METABOLIC PNL TOTAL CA: CPT | Mod: ZL | Performed by: NURSE PRACTITIONER

## 2019-04-11 ASSESSMENT — PAIN SCALES - GENERAL: PAINLEVEL: NO PAIN (0)

## 2019-04-11 ASSESSMENT — MIFFLIN-ST. JEOR: SCORE: 1637.79

## 2019-04-11 NOTE — PROGRESS NOTES
"Chief Complaint   Patient presents with     Diabetes       Initial There were no vitals taken for this visit. Estimated body mass index is 39.91 kg/m  as calculated from the following:    Height as of 4/4/19: 1.626 m (5' 4\").    Weight as of 4/4/19: 105.5 kg (232 lb 8 oz).  Medication Reconciliation: complete    Gege Walter LPN    "

## 2019-04-11 NOTE — PROGRESS NOTES
SUBJECTIVE:  Marly Cannon, 53 year old, female presents with the following Chief Complaint(s) with HPI to follow:  Chief Complaint   Patient presents with     Diabetes     CGM evaluation        Diabetes Follow-up      Patient is checking blood sugars: no meter.  Results:      Symptoms of hypoglycemia (low blood sugar): none    Paresthesias (numbness or burning in feet) or sores: No    Diabetic eye exam within the last year: Yes    Breakfast eaten regularly: once in awhile     Patient counting carbs: Yes       HPI:  Marly's here today for the follow up regarding her Diabetes mellitus, Type 2.    Lab Results   Component Value Date    A1C 9.8 02/22/2019    A1C 10.9 11/21/2018    A1C 9.6 08/21/2018    A1C 9.6 08/21/2018    A1C 7.7 05/22/2018     Current Diabetes medication:   1.  Tresiba 75 units daily (before supper)--no missed doses  2.  Glipizide 10 mg, 1 tablet in the morning and supper time  3.  Novolog per sliding  ASA use: yes, 81 mg daily  Statin use: yes, simvastatin 20 mg daily    Marly's here today for her CGM study download and evaluation. Reports the following:   Denies any missed doses of insulin.   Denies any new health concerns.       Patient Active Problem List   Diagnosis     Type 2 diabetes, HbA1c goal < 7% (H)     ACP (advance care planning)     Stage 3 chronic kidney disease (H)     Pulmonary emphysema (H)     Hyperparathyroidism due to renal insufficiency (H)     Morbid obesity (H)     Vitamin D deficiency     Intellectual disability     Breast fibrocystic disorder     Tobacco abuse     Microalbuminuria due to type 2 diabetes mellitus (H)       No past medical history on file.    Past Surgical History:   Procedure Laterality Date     COLONOSCOPY N/A 8/20/2015    Procedure: COLONOSCOPY;  Surgeon: Gentry Porter MD;  Location: [facility]     COLONOSCOPY N/A 9/21/2018    Procedure: COLONOSCOPY;  COLONOSCOPY WITH POLYPECTOMY;  Surgeon: Gentry Porter MD;  Location: [facility]       Family  History   Problem Relation Age of Onset     Diabetes Mother      Diabetes Father      Hypertension Brother      Diabetes Sister        Social History     Tobacco Use     Smoking status: Current Every Day Smoker     Packs/day: 1.00     Years: 34.00     Pack years: 34.00     Types: Cigarettes     Start date: 1/1/1979     Smokeless tobacco: Never Used   Substance Use Topics     Alcohol use: No     Alcohol/week: 0.0 oz       Current Outpatient Medications   Medication Sig Dispense Refill     Acetaminophen (TYLENOL PO) Take 325 mg by mouth every 6 hours as needed        AMLODIPINE BESYLATE PO Take 2.5 mg by mouth daily       ammonium lactate (LAC-HYDRIN) 12 % cream Apply topically 2 times daily       ASPIRIN EC PO Take 81 mg by mouth daily       blood glucose monitoring (NO BRAND SPECIFIED) test strip Use to test blood sugar 3 times daily or as directed. 300 strip 3     Cholecalciferol (VITAMIN D-3 PO) Take 2,000 Units by mouth daily       Cyclobenzaprine HCl (FLEXERIL PO) Take 10 mg by mouth 3 times daily as needed for muscle spasms       dulaglutide (TRULICITY) 0.75 MG/0.5ML pen Inject 0.75 mg Subcutaneous every 7 days 2 mL 1     GLIPIZIDE PO Take 10 mg by mouth 2 times daily       HYDROCHLOROTHIAZIDE PO Take 12.5 mg by mouth daily        hydrocortisone 2.5 % cream Apply topically 2 times daily       insulin aspart (NOVOLOG PEN) 100 UNIT/ML pen Inject 18 units prior to each meal. TDD 54 units 15 mL 3     insulin degludec (TRESIBA FLEXTOUCH) 200 UNIT/ML pen Inject 70 Units Subcutaneous daily 18 mL 3     insulin pen needle (32G X 4 MM) 32G X 4 MM miscellaneous Use 1 pen needles daily 100 each 3     Ipratropium-Albuterol (COMBIVENT RESPIMAT)  MCG/ACT inhaler Inhale 1 puff into the lungs 4 times daily       LISINOPRIL PO Take 40 mg by mouth daily       ONETOUCH DELICA LANCETS 33G MISC 1 each 3 times daily 100 each 10     PRIMIDONE PO Take by mouth At Bedtime       SIMVASTATIN PO Take 20 mg by mouth At Bedtime         "triamcinolone (KENALOG) 0.1 % cream Apply topically 2 times daily         No Known Allergies    REVIEW OF SYSTEMS  Skin: negative  Eyes: negative  Ears/Nose/Throat: negative  Respiratory: No shortness of breath, dyspnea on exertion, cough, or hemoptysis  Cardiovascular: negative  Gastrointestinal: negative  Genitourinary: negative  Musculoskeletal: negative  Neurologic: positive for numbness or tingling of feet-better today  Psychiatric: negative  Hematologic/Lymphatic/Immunologic: negative  Endocrine: positive for diabetes    OBJECTIVE:  /80   Pulse 85   Resp 16   Ht 1.626 m (5' 4\")   Wt 105.8 kg (233 lb 3.2 oz)   SpO2 92%   BMI 40.03 kg/m    Constitutional: alert and no distress  Cardiovascular: RRR. No murmurs, clicks gallops or rub  Respiratory:  Good diaphragmatic excursion. Lungs clear except RLL inspiratory wheeze  Psychiatric: mentation appears normal for baseline and affect normal/bright    LABS  Results for orders placed or performed in visit on 19   TSH with free T4 reflex   Result Value Ref Range    TSH 1.16 0.40 - 4.00 mU/L   Basic metabolic panel   Result Value Ref Range    Sodium 136 133 - 144 mmol/L    Potassium 3.7 3.4 - 5.3 mmol/L    Chloride 98 94 - 109 mmol/L    Carbon Dioxide 33 (H) 20 - 32 mmol/L    Anion Gap 5 3 - 14 mmol/L    Glucose 208 (H) 70 - 99 mg/dL    Urea Nitrogen 19 7 - 30 mg/dL    Creatinine 1.21 (H) 0.52 - 1.04 mg/dL    GFR Estimate 50 (L) >60 mL/min/[1.73_m2]    GFR Estimate If Black 58 (L) >60 mL/min/[1.73_m2]    Calcium 9.7 8.5 - 10.1 mg/dL       ASSESSMENT / PLAN:  (E11.65,  Z79.4) Type 2 diabetes mellitus with hyperglycemia, with long-term current use of insulin (H)  (primary encounter diagnosis)  Comment:   Noted CGM     Findings:  Ave. S (last download: 320)      Date: 3/28/19    Ave. Glucose: 329   Time in target: 0%  Time below target: 0%  Time above target: 100%    Date: 3/29    Ave. Glucose: 314   Time in target: 0%  Time below target: 0%  Time " above target: 100%    Date: 3/30    Ave. Glucose: 356   Time in target: 0%  Time below target: 0%  Time above target: 100%    Date: 3/31    Ave. Glucose: 325   Time in target: 0%  Time below target: 0%  Time above target: 100%    Date: 4/1    Ave. Glucose: 318   Time in target: 0%  Time below target: 0%  Time above target: 100%    Date: 4/2    Ave. Glucose: 319   Time in target: 0%  Time below target: 0%  Time above target: 100%    Date: 4/3    Ave. Glucose: 293   Time in target: 0%  Time below target: 0%  Time above target: 100%    Date: 4/4    Ave. Glucose: 288   Time in target: 0%  Time below target: 0%  Time above target: 100%    Date: 4/5    Ave. Glucose: 245   Time in target: 51%  Time below target: 0%  Time above target: 49%    Distribution Data:  Percentage above 180: 92%  Percentage within : 8%  Percentage below 70: 0%    Hypoglycemia incidence:  none    Food choices/Carbohydrate counting:  Didn't keep food logs    Exercise:  walks    Recommendations:  Stop Glipizide and start Trulicity    Patient able to demonstrate how to use a Trulicity pen    (Z72.0) Tobacco abuse  Comment: note and refused  Plan: Tobacco Cessation - Order to Satisfy Health         Maintenance          Marly's aware to let us know when she's ready to quit smoking.  Discussed the dangers of smoking with diabetes      (Z71.6) Tobacco abuse counseling  Comment: noted and refused  Plan: as mentioned above      Patient Instructions   Continue working on healthy eating and moving (start low and slow, work up to 30 min, 5x/week)    BG goals:  Fasting and before meals <130, >70  2 hour after eating <180    We only need 1/2 of these numbers to be within target then your A1c will be within target    Medication changes   1.  Plan A (If you can get the Trulicity today)   STOP your Glipizide (remove from your pill container)  Starting tomorrow (Friday, April 12th), inject Trulicity 0.75 mg weekly (or every Friday)  Keep the Tresiba dose the  same  Hold Novolog for now    2.  Plan B (if you CAN'T get the Trulicity today)  Increase your Tresiba to 80 units daily  Novolog BEFORE meals as followed  : 5 units  201-300: 8 units  >301: 10 units    Follow up   One week    Call me sooner if any problems/concerns and/or questions develop including consistent low BGs <70 or consistent high BGs >200  [phone] (Unit Coordinator)    [phone] (Nurse)       HOW TO QUIT SMOKING  Smoking is one of the hardest habits to break. About half of all those who have ever smoked have been able to quit, and most of those (about 70%) who still smoke want to quit. Here are some of the best ways to stop smoking.     KEEP TRYING:  It takes most smokers about 8 tries before they are finally able to fully quit. So, the more often you try and fail, the better your chance of quitting the next time! So, don't give up!    GO COLD TURKEY:  Most ex-smokers quit cold turkey. Trying to cut back gradually doesn't seem to work as well, perhaps because it continues the smoking habit. Also, it is possible to fool yourself by inhaling more while smoking fewer cigarettes. This results in the same amount of nicotine in your body!    GET SUPPORT:  Support programs can make an important difference, especially for the heavy smoker. These groups offer lectures, methods to change your behavior and peer support. Call the free national Quitline for more information. 800-QUIT-NOW (375-501-1715). Low-cost or free programs are offered by many hospitals, local chapters of the American Lung Association (889-743-3101) and the American Cancer Society (742-095-0992). Support at home is important too. Non-smokers can help by offering praise and encouragement. If the smoker fails to quit, encourage them to try again!    OVER-THE-COUNTER MEDICINES:  For those who can't quit on their own, Nicotine Replacement Therapy (NRT) may make quitting much easier. Certain aids such as the nicotine patch, gum and  lozenge are available without a prescription. However, it is best to use these under the guidance of your doctor. The skin patch provides a steady supply of nicotine to the body. Nicotine gum and lozenge gives temporary bursts of low levels of nicotine. Both methods take the edge off the craving for cigarettes. WARNING: If you feel symptoms of nicotine overdose, such as nausea, vomiting, dizziness, weakness, or fast heartbeat, stop using these and see your doctor.    PRESCRIPTION MEDICINES:  After evaluating your smoking patterns and prior attempts at quitting, your doctor may offer a prescription medicine such as bupropion (Zyban, Wellbutrin), varenicline (Chantix, Champix), a niocotine inhaler or nasal spray. Each has its unique advantage and side effects which your doctor can review with you.    HEALTH BENEFITS OF QUITTING:  The benefits of quitting start right away and keep improving the longer you go without smokin minutes: blood pressure and pulse return to normal  8 hours: oxygen levels return to normal  2 days: ability to smell and taste begins to improve as damaged nerves start to regrow  2-3 weeks: circulation and lung function improves  1-9 months: decreased cough, congestion and shortness of breath; less tired  1 year: risk of heart attack decreases by half  5 years: risk of lung cancer decreases by half; risk of stroke becomes the same as a non-smoker  For information about how to quit smoking, visit the following links:  National Cancer [place] ,   Clearing the Air, Quit Smoking Today   - an online booklet. http://www.smokefree.gov/pubs/clearing_the_air.pdf  Smokefree.gov http://smokefree.gov/  QuitNet http://www.quitnet.com/    8282-8319 Elisa Munson, [address] [place] [address], [place] [address]. All rights reserved. This information is not intended as a substitute for professional medical care. Always follow your healthcare professional's instructions.    The Benefits of Living Smoke  Free  What do you want to gain from quitting? Check off some reasons to quit.  Health Benefits  ___ Reduce my risk of lung cancer, heart disease, chronic lung disease  ___ Have fewer wrinkles and softer skin  ___ Improve my sense of taste and smell  ___ For pregnant women--reduce the risk of having a miscarriage, stillbirth, premature birth, or low-birth-weight baby  Personal Benefits  ___ Feel more in control of my life  ___ Have better-smelling hair, breath, clothes, home, and car  ___ Save time by not having to take smoke breaks, buy cigarettes, or hunt for a light  ___ Have whiter teeth  Family Benefits  ___ Reduce my children s respiratory tract infections  ___ Set a good example for my children  ___ Reduce my family s cancer risk  Financial Benefits  ___ Save hundreds of dollars each year that would be spent on cigarettes  ___ Save money on medical bills  ___ Save on life, health, and car insurance premiums    Those Dollars Add Up!  Cigarettes are expensive, and getting more expensive all the time. Do you realize how much money you are spending on cigarettes per year? What is the average amount you spend on a pack of cigarettes? What is the average number of packs that you smoke per day? Using your answers to these questions, fill in this formula to help you find out:  ($ _____ per pack) ×  ( _____ number of packs per day) × (365 days) =  $ _____ yearly cost of smoking  Besides tobacco, there are other costs, including extra cleaning bills and replacement costs for clothing and furniture; medical expenses for smoking-related illnesses; and higher health, life, and car insurance premiums.    Cigars and Pipes Count Too!  Cigars and pipes are also dangerous. So are smokeless (chewing) tobacco and snuff. All of these products contain nicotine, a highly addictive substance that has harmful effects on your body. Quitting smoking means giving up all tobacco products.      1470-7345 Elisa Munson, [address] [place] [address]  Road, [place] [address]. All rights reserved. This information is not intended as a substitute for professional medical care. Always follow your healthcare professional's instructions.      Time: 45 minutes  Barrier: see [facility]  Willingness to learn: accepting    Courtney PINTO [facility]  Disease Management    Cc: Amberly Whyte NP      With the electronic record, we can now more quickly and easily track our patient diabetic goals. Our diabetes clinical review is in progress and these are the indicators we are monitoring for good diabetes health.     1.) HbA1C less than 7 (measurement of your average blood sugars)  2.) Blood Pressure less than 140/80  3.) LDL less than 100 (bad cholesterol)  4.) HbA1C is checked in the last 6 months and below 7% (more frequently if not at goal or adjusting medications)  5.) LDL is checked in the last 12 months (more frequently if not at goal or adjusting medications)  6.) Taking one baby aspirin daily (unless otherwise instructed)  7.) No tobacco use  8) Statin use     You have achieved 6 out of 8 of these and I am encouraging you to come in and get tuned up to achieve 8 out of 8!  Here is what you have achieved so far in my goals for you:  1.) HbA1C  less than 7:                              NO  Your last  HbA1C :  Lab Results   Component Value Date    A1C 9.8 02/22/2019   .  2.) Blood Pressure less than 140/80:       YES      Your last    BP Readings from Last 1 Encounters:   04/11/19 119/80     3.) LDL less than 100:                              YES      Your last     Lab Results   Component Value Date    LDL 86 02/22/2019     4.) Checked HbA1C in the past 6 months: YES      5.) Checked LDL in the past 12 months:    YES      6.) Taking one aspirin daily:                       YES     7.) No tobacco use:                                        NO   8.) Statin use      YES       Addendum:  Marly was able to get her Trulicity

## 2019-04-11 NOTE — PATIENT INSTRUCTIONS
Continue working on healthy eating and moving (start low and slow, work up to 30 min, 5x/week)    BG goals:  Fasting and before meals <130, >70  2 hour after eating <180    We only need 1/2 of these numbers to be within target then your A1c will be within target    Medication changes   1.  Plan A (If you can get the Trulicity today)   STOP your Glipizide (remove from your pill container)  Starting tomorrow (Friday, April 12th), inject Trulicity 0.75 mg weekly (or every Friday)  Keep the Tresiba dose the same  Hold Novolog for now    2.  Plan B (if you CAN'T get the Trulicity today)  Increase your Tresiba to 80 units daily  Novolog BEFORE meals as followed  : 5 units  201-300: 8 units  >301: 10 units    Follow up   One week    Call me sooner if any problems/concerns and/or questions develop including consistent low BGs <70 or consistent high BGs >200  [phone] (Unit Coordinator)    [phone] (Nurse)       HOW TO QUIT SMOKING  Smoking is one of the hardest habits to break. About half of all those who have ever smoked have been able to quit, and most of those (about 70%) who still smoke want to quit. Here are some of the best ways to stop smoking.     KEEP TRYING:  It takes most smokers about 8 tries before they are finally able to fully quit. So, the more often you try and fail, the better your chance of quitting the next time! So, don't give up!    GO COLD TURKEY:  Most ex-smokers quit cold turkey. Trying to cut back gradually doesn't seem to work as well, perhaps because it continues the smoking habit. Also, it is possible to fool yourself by inhaling more while smoking fewer cigarettes. This results in the same amount of nicotine in your body!    GET SUPPORT:  Support programs can make an important difference, especially for the heavy smoker. These groups offer lectures, methods to change your behavior and peer support. Call the free national Quitline for more information. 800-QUIT-NOW (416-331-8164).  Low-cost or free programs are offered by many hospitals, local chapters of the American Lung Association (485-901-3309) and the American Cancer Society (897-291-4005). Support at home is important too. Non-smokers can help by offering praise and encouragement. If the smoker fails to quit, encourage them to try again!    OVER-THE-COUNTER MEDICINES:  For those who can't quit on their own, Nicotine Replacement Therapy (NRT) may make quitting much easier. Certain aids such as the nicotine patch, gum and lozenge are available without a prescription. However, it is best to use these under the guidance of your doctor. The skin patch provides a steady supply of nicotine to the body. Nicotine gum and lozenge gives temporary bursts of low levels of nicotine. Both methods take the edge off the craving for cigarettes. WARNING: If you feel symptoms of nicotine overdose, such as nausea, vomiting, dizziness, weakness, or fast heartbeat, stop using these and see your doctor.    PRESCRIPTION MEDICINES:  After evaluating your smoking patterns and prior attempts at quitting, your doctor may offer a prescription medicine such as bupropion (Zyban, Wellbutrin), varenicline (Chantix, Champix), a niocotine inhaler or nasal spray. Each has its unique advantage and side effects which your doctor can review with you.    HEALTH BENEFITS OF QUITTING:  The benefits of quitting start right away and keep improving the longer you go without smokin minutes: blood pressure and pulse return to normal  8 hours: oxygen levels return to normal  2 days: ability to smell and taste begins to improve as damaged nerves start to regrow  2-3 weeks: circulation and lung function improves  1-9 months: decreased cough, congestion and shortness of breath; less tired  1 year: risk of heart attack decreases by half  5 years: risk of lung cancer decreases by half; risk of stroke becomes the same as a non-smoker  For information about how to quit smoking, visit  the following links:  National Cancer [place] ,   Clearing the Air, Quit Smoking Today   - an online booklet. http://www.smokefree.gov/pubs/clearing_the_air.pdf  Smokefree.gov http://smokefree.gov/  QuitNet http://www.quitnet.com/    [phone] Elisa Munson, [address] [place] [address], [place] [address]. All rights reserved. This information is not intended as a substitute for professional medical care. Always follow your healthcare professional's instructions.    The Benefits of Living Smoke Free  What do you want to gain from quitting? Check off some reasons to quit.  Health Benefits  ___ Reduce my risk of lung cancer, heart disease, chronic lung disease  ___ Have fewer wrinkles and softer skin  ___ Improve my sense of taste and smell  ___ For pregnant women--reduce the risk of having a miscarriage, stillbirth, premature birth, or low-birth-weight baby  Personal Benefits  ___ Feel more in control of my life  ___ Have better-smelling hair, breath, clothes, home, and car  ___ Save time by not having to take smoke breaks, buy cigarettes, or hunt for a light  ___ Have whiter teeth  Family Benefits  ___ Reduce my children s respiratory tract infections  ___ Set a good example for my children  ___ Reduce my family s cancer risk  Financial Benefits  ___ Save hundreds of dollars each year that would be spent on cigarettes  ___ Save money on medical bills  ___ Save on life, health, and car insurance premiums    Those Dollars Add Up!  Cigarettes are expensive, and getting more expensive all the time. Do you realize how much money you are spending on cigarettes per year? What is the average amount you spend on a pack of cigarettes? What is the average number of packs that you smoke per day? Using your answers to these questions, fill in this formula to help you find out:  ($ _____ per pack) ×  ( _____ number of packs per day) × (365 days) =  $ _____ yearly cost of smoking  Besides tobacco, there are other costs, including  extra cleaning bills and replacement costs for clothing and furniture; medical expenses for smoking-related illnesses; and higher health, life, and car insurance premiums.    Cigars and Pipes Count Too!  Cigars and pipes are also dangerous. So are smokeless (chewing) tobacco and snuff. All of these products contain nicotine, a highly addictive substance that has harmful effects on your body. Quitting smoking means giving up all tobacco products.      3051-6040 [facility], [address] [place] [address], [place] [address]. All rights reserved. This information is not intended as a substitute for professional medical care. Always follow your healthcare professional's instructions.

## 2019-04-19 NOTE — PROGRESS NOTES
SUBJECTIVE:   Marly Cannon is a 53 year old female who presents to clinic today for the following   health issues:      New Patient/Transfer of Care; [facility]  Patient is in need of a new provider. she has been explained the role of a CNP and the fact that I do not follow patients in the hospital. she was told that should he get admitted, he would then be followed by a hospitalist. she verbalizes understanding and would like to establish a relationship now. Colonoscopy UTD. Due for CP with pap. Declines the influenza or shingles vaccine.       Patient has DM with microalbuminuria. On lisinopril. Was recently seen on 2/22 for DM and her A1C was 8.6. Following with the DM center. Notes that she is taking her medications as prescribed.     Patient also has stage 3 chronic kidney disease with hyperparathyroidism. Follows with nephrology. Saw them one year ago at [facility] and is due for a follow up. Recent BMP stable. No h/o kidney stones. Recent calcium normal.     Patient also has pulmonary emphysema which was confirmed via lung functions tests in 2015. She notes that this is well controlled with her Combivent inhaler. Uses this 4 times daily. Denies any shortness of breath or chest pain. No wheezes. She continues to smoke 1 ppd-has done this since she was 14. She declines low dose chest CT at this time.       Additional history: as documented    Reviewed  and updated as needed this visit by clinical staff  Tobacco  Meds  Med Hx  Surg Hx  Fam Hx  Soc Hx        Reviewed and updated as needed this visit by Provider         Patient Active Problem List   Diagnosis     Type 2 diabetes, HbA1c goal < 7% (H)     ACP (advance care planning)     Stage 3 chronic kidney disease (H)     Pulmonary emphysema (H)     Hyperparathyroidism due to renal insufficiency (H)     Morbid obesity (H)     Vitamin D deficiency     Intellectual disability     Breast fibrocystic disorder     Tobacco abuse     Microalbuminuria due to  type 2 diabetes mellitus (H)     H/O colonoscopy     Past Surgical History:   Procedure Laterality Date     COLONOSCOPY N/A 8/20/2015    Procedure: COLONOSCOPY;  Surgeon: Gentry Porter MD;  Location: [facility]     COLONOSCOPY N/A 9/21/2018    Procedure: COLONOSCOPY;  COLONOSCOPY WITH POLYPECTOMY;  Surgeon: Gentry Porter MD;  Location: [facility] OR       Social History     Tobacco Use     Smoking status: Current Every Day Smoker     Packs/day: 1.00     Years: 34.00     Pack years: 34.00     Types: Cigarettes     Start date: 1/1/1979     Smokeless tobacco: Never Used     Tobacco comment: Declined QP 04/24/19   Substance Use Topics     Alcohol use: No     Alcohol/week: 0.0 oz     Family History   Problem Relation Age of Onset     Diabetes Mother      Diabetes Father      Hypertension Brother      Diabetes Sister          Current Outpatient Medications   Medication Sig Dispense Refill     Acetaminophen (TYLENOL PO) Take 325 mg by mouth every 6 hours as needed        AMLODIPINE BESYLATE PO Take 2.5 mg by mouth daily       ammonium lactate (LAC-HYDRIN) 12 % cream Apply topically 2 times daily       ASPIRIN EC PO Take 81 mg by mouth daily       blood glucose monitoring (NO BRAND SPECIFIED) test strip Use to test blood sugar 3 times daily or as directed. 300 strip 3     Cholecalciferol (VITAMIN D-3 PO) Take 2,000 Units by mouth daily       Cyclobenzaprine HCl (FLEXERIL PO) Take 10 mg by mouth 3 times daily as needed for muscle spasms       dulaglutide (TRULICITY) 0.75 MG/0.5ML pen Inject 0.75 mg Subcutaneous every 7 days 2 mL 1     HYDROCHLOROTHIAZIDE PO Take 12.5 mg by mouth daily        hydrocortisone 2.5 % cream Apply topically 2 times daily       insulin aspart (NOVOLOG PEN) 100 UNIT/ML pen Inject 5 units if BG is greater than 200; inject 10 units if BG is greater than 300 before meals.  TDD: 30 units 15 mL 3     insulin degludec (TRESIBA FLEXTOUCH) 200 UNIT/ML pen Inject 80 Units Subcutaneous daily 18 mL 3      "insulin pen needle (32G X 4 MM) 32G X 4 MM miscellaneous Use 1 pen needles daily 100 each 3     Ipratropium-Albuterol (COMBIVENT RESPIMAT)  MCG/ACT inhaler Inhale 1 puff into the lungs 4 times daily       LISINOPRIL PO Take 40 mg by mouth daily       ONETOUCH DELICA LANCETS 33G MISC 1 each 3 times daily 100 each 10     PRIMIDONE PO Take by mouth At Bedtime       SIMVASTATIN PO Take 20 mg by mouth At Bedtime        triamcinolone (KENALOG) 0.1 % cream Apply topically 2 times daily       No Known Allergies    ROS:  As noted in the HPI.     OBJECTIVE:     BP 92/62 (BP Location: Left arm, Patient Position: Sitting, Cuff Size: Adult Large)   Pulse 95   Temp 96.8  F (36  C) (Tympanic)   Resp 20   Ht 1.6 m (5' 3\")   Wt 102.1 kg (225 lb)   SpO2 92%   BMI 39.86 kg/m    Body mass index is 39.86 kg/m .  GENERAL: healthy, overweight and no distress  RESP: lungs clear to auscultation - no rales, rhonchi or wheezes  CV: regular rate and rhythm, normal S1 S2, no S3 or S4, no murmur  PSYCH: mentation appears normal, affect normal/bright    Diagnostic Test Results:  none     ASSESSMENT/PLAN:   (N18.3) Stage 3 chronic kidney disease (H)  (primary encounter diagnosis)  (N25.81) Hyperparathyroidism due to renal insufficiency (H)  Plan: NEPHROLOGY ADULT REFERRAL        Managed by nephrology. Will refer back. BP well controlled. Will work on limiting sodium to 2 grams.     (J43.9) Pulmonary emphysema, unspecified emphysema type (H)  Comment: well controlled.   Plan: Continue Combivent.     (Z72.0) Tobacco abuse  (Z71.6) Tobacco abuse counseling  Plan: Cessation encouraged. Declined help at this time. Declines low dose chest CT, but will again ask at following visit.     (E11.29,  R80.9,  Z79.4) Type 2 diabetes mellitus with microalbuminuria, with long-term current use of insulin (H)  Comment: recent A1C 8.6. Seen last on 2/22  Plan: Hemoglobin A1c        She will f/u for DM on 5/22 of after. Will then recheck A1C. Following " with [facility] Center. See notes.     (Z11.4) Screening for HIV (human immunodeficiency virus)  Plan: HIV Antigen Antibody Combo        Will notify patient of the results when available and intervene accordingly.     (Z11.59) Need for hepatitis C screening test  Plan: Hepatitis C antibody        Will notify patient of the results when available and intervene accordingly.     (Z76.89) Encounter to establish care  Plan: Patient is in need of a new provider. she has been explained the role of a CNP and the fact that I do not follow patients in the hospital. she was told that should he get admitted, he would then be followed by a hospitalist. she verbalizes understanding and would like to establish a relationship now. Will make appointment for CP with pap.         Amberly Whyte NP  [facility]

## 2019-04-24 ENCOUNTER — ALLIED HEALTH/NURSE VISIT (OUTPATIENT)
Dept: EDUCATION SERVICES | Facility: OTHER | Age: 54
End: 2019-04-24
Attending: NURSE PRACTITIONER
Payer: MEDICARE

## 2019-04-24 ENCOUNTER — TELEPHONE (OUTPATIENT)
Dept: FAMILY MEDICINE | Facility: OTHER | Age: 54
End: 2019-04-24

## 2019-04-24 ENCOUNTER — OFFICE VISIT (OUTPATIENT)
Dept: FAMILY MEDICINE | Facility: OTHER | Age: 54
End: 2019-04-24
Attending: NURSE PRACTITIONER
Payer: MEDICARE

## 2019-04-24 VITALS
SYSTOLIC BLOOD PRESSURE: 108 MMHG | DIASTOLIC BLOOD PRESSURE: 71 MMHG | BODY MASS INDEX: 39.46 KG/M2 | HEART RATE: 99 BPM | OXYGEN SATURATION: 86 % | HEIGHT: 64 IN | WEIGHT: 231.1 LBS | RESPIRATION RATE: 20 BRPM

## 2019-04-24 VITALS
BODY MASS INDEX: 39.87 KG/M2 | WEIGHT: 225 LBS | HEIGHT: 63 IN | DIASTOLIC BLOOD PRESSURE: 62 MMHG | RESPIRATION RATE: 20 BRPM | HEART RATE: 95 BPM | TEMPERATURE: 96.8 F | SYSTOLIC BLOOD PRESSURE: 92 MMHG | OXYGEN SATURATION: 92 %

## 2019-04-24 DIAGNOSIS — Z11.4 SCREENING FOR HIV (HUMAN IMMUNODEFICIENCY VIRUS): ICD-10-CM

## 2019-04-24 DIAGNOSIS — Z72.0 TOBACCO ABUSE: ICD-10-CM

## 2019-04-24 DIAGNOSIS — Z11.59 NEED FOR HEPATITIS C SCREENING TEST: ICD-10-CM

## 2019-04-24 DIAGNOSIS — Z71.6 TOBACCO ABUSE COUNSELING: ICD-10-CM

## 2019-04-24 DIAGNOSIS — R80.9 TYPE 2 DIABETES MELLITUS WITH MICROALBUMINURIA, WITH LONG-TERM CURRENT USE OF INSULIN (H): ICD-10-CM

## 2019-04-24 DIAGNOSIS — J43.9 PULMONARY EMPHYSEMA, UNSPECIFIED EMPHYSEMA TYPE (H): ICD-10-CM

## 2019-04-24 DIAGNOSIS — E11.29 TYPE 2 DIABETES MELLITUS WITH MICROALBUMINURIA, WITH LONG-TERM CURRENT USE OF INSULIN (H): ICD-10-CM

## 2019-04-24 DIAGNOSIS — Z76.89 ENCOUNTER TO ESTABLISH CARE: ICD-10-CM

## 2019-04-24 DIAGNOSIS — E11.65 TYPE 2 DIABETES MELLITUS WITH HYPERGLYCEMIA, WITH LONG-TERM CURRENT USE OF INSULIN (H): Primary | ICD-10-CM

## 2019-04-24 DIAGNOSIS — Z79.4 TYPE 2 DIABETES MELLITUS WITH HYPERGLYCEMIA, WITH LONG-TERM CURRENT USE OF INSULIN (H): Primary | ICD-10-CM

## 2019-04-24 DIAGNOSIS — N18.30 STAGE 3 CHRONIC KIDNEY DISEASE (H): Primary | ICD-10-CM

## 2019-04-24 DIAGNOSIS — Z79.4 TYPE 2 DIABETES MELLITUS WITH MICROALBUMINURIA, WITH LONG-TERM CURRENT USE OF INSULIN (H): ICD-10-CM

## 2019-04-24 DIAGNOSIS — N25.81 HYPERPARATHYROIDISM DUE TO RENAL INSUFFICIENCY (H): ICD-10-CM

## 2019-04-24 PROBLEM — Z98.890 H/O COLONOSCOPY: Status: ACTIVE | Noted: 2019-04-24

## 2019-04-24 PROCEDURE — 99214 OFFICE O/P EST MOD 30 MIN: CPT | Performed by: NURSE PRACTITIONER

## 2019-04-24 PROCEDURE — G0463 HOSPITAL OUTPT CLINIC VISIT: HCPCS | Mod: 25

## 2019-04-24 PROCEDURE — 99213 OFFICE O/P EST LOW 20 MIN: CPT | Performed by: NURSE PRACTITIONER

## 2019-04-24 PROCEDURE — G0463 HOSPITAL OUTPT CLINIC VISIT: HCPCS

## 2019-04-24 PROCEDURE — G0463 HOSPITAL OUTPT CLINIC VISIT: HCPCS | Mod: 27

## 2019-04-24 ASSESSMENT — ANXIETY QUESTIONNAIRES
3. WORRYING TOO MUCH ABOUT DIFFERENT THINGS: NOT AT ALL
1. FEELING NERVOUS, ANXIOUS, OR ON EDGE: NOT AT ALL
5. BEING SO RESTLESS THAT IT IS HARD TO SIT STILL: SEVERAL DAYS
6. BECOMING EASILY ANNOYED OR IRRITABLE: NOT AT ALL
IF YOU CHECKED OFF ANY PROBLEMS ON THIS QUESTIONNAIRE, HOW DIFFICULT HAVE THESE PROBLEMS MADE IT FOR YOU TO DO YOUR WORK, TAKE CARE OF THINGS AT HOME, OR GET ALONG WITH OTHER PEOPLE: VERY DIFFICULT
GAD7 TOTAL SCORE: 1
2. NOT BEING ABLE TO STOP OR CONTROL WORRYING: NOT AT ALL
7. FEELING AFRAID AS IF SOMETHING AWFUL MIGHT HAPPEN: NOT AT ALL

## 2019-04-24 ASSESSMENT — PAIN SCALES - GENERAL
PAINLEVEL: NO PAIN (0)
PAINLEVEL: NO PAIN (0)

## 2019-04-24 ASSESSMENT — PATIENT HEALTH QUESTIONNAIRE - PHQ9
5. POOR APPETITE OR OVEREATING: NOT AT ALL
SUM OF ALL RESPONSES TO PHQ QUESTIONS 1-9: 14

## 2019-04-24 ASSESSMENT — MIFFLIN-ST. JEOR
SCORE: 1584.72
SCORE: 1628.26

## 2019-04-24 NOTE — NURSING NOTE
"Chief Complaint   Patient presents with     Establish Care       Initial BP 92/62 (BP Location: Left arm, Patient Position: Sitting, Cuff Size: Adult Large)   Pulse 95   Temp 96.8  F (36  C) (Tympanic)   Resp 20   Ht 1.6 m (5' 3\")   Wt 102.1 kg (225 lb)   SpO2 92%   BMI 39.86 kg/m   Estimated body mass index is 39.86 kg/m  as calculated from the following:    Height as of this encounter: 1.6 m (5' 3\").    Weight as of this encounter: 102.1 kg (225 lb).  Medication Reconciliation: complete    April Brambila LPN  "

## 2019-04-24 NOTE — TELEPHONE ENCOUNTER
"Per provider's request called [facility] and spoke with Justine to request documentation of the patient's eye exam from 3/8/19. Justine/eye doctor will be faxing said information to provider on 4/25/19 to [phone].    April \"Aramis\" WILFREDO Brambila    "

## 2019-04-24 NOTE — PATIENT INSTRUCTIONS

## 2019-04-24 NOTE — PATIENT INSTRUCTIONS
Continue working on healthy eating and moving (start low and slow, work up to 30 min, 5x/week)    BG goals:  Fasting and before meals <130, >70  2 hour after eating <180    We only need 1/2 of these numbers to be within target then your A1c will be within target    Medication changes   1.  Trulicity  Every Friday, keep taking 0.75 mg    2.  Novolog  5 units before meals if BG is greater than 200 (OR if you are eating and didn't check a BG)     10 units if BG is greater than 300 before meals     If you are sweaty, dizzy, shaky or lightheaded, please check a BG before injecting insulin    Follow up   Week of May 7th    Call me sooner if any problems/concerns and/or questions develop including consistent low BGs <70 or consistent high BGs >200  [phone] (Unit Coordinator)    [phone] (Nurse)         HOW TO QUIT SMOKING  Smoking is one of the hardest habits to break. About half of all those who have ever smoked have been able to quit, and most of those (about 70%) who still smoke want to quit. Here are some of the best ways to stop smoking.     KEEP TRYING:  It takes most smokers about 8 tries before they are finally able to fully quit. So, the more often you try and fail, the better your chance of quitting the next time! So, don't give up!    GO COLD TURKEY:  Most ex-smokers quit cold turkey. Trying to cut back gradually doesn't seem to work as well, perhaps because it continues the smoking habit. Also, it is possible to fool yourself by inhaling more while smoking fewer cigarettes. This results in the same amount of nicotine in your body!    GET SUPPORT:  Support programs can make an important difference, especially for the heavy smoker. These groups offer lectures, methods to change your behavior and peer support. Call the free national Quitline for more information. 800-QUIT-NOW (741-902-0397). Low-cost or free programs are offered by many hospitals, local chapters of the American Lung Association  (629.435.9593) and the American Cancer Society (072-154-8101). Support at home is important too. Non-smokers can help by offering praise and encouragement. If the smoker fails to quit, encourage them to try again!    OVER-THE-COUNTER MEDICINES:  For those who can't quit on their own, Nicotine Replacement Therapy (NRT) may make quitting much easier. Certain aids such as the nicotine patch, gum and lozenge are available without a prescription. However, it is best to use these under the guidance of your doctor. The skin patch provides a steady supply of nicotine to the body. Nicotine gum and lozenge gives temporary bursts of low levels of nicotine. Both methods take the edge off the craving for cigarettes. WARNING: If you feel symptoms of nicotine overdose, such as nausea, vomiting, dizziness, weakness, or fast heartbeat, stop using these and see your doctor.    PRESCRIPTION MEDICINES:  After evaluating your smoking patterns and prior attempts at quitting, your doctor may offer a prescription medicine such as bupropion (Zyban, Wellbutrin), varenicline (Chantix, Champix), a niocotine inhaler or nasal spray. Each has its unique advantage and side effects which your doctor can review with you.    HEALTH BENEFITS OF QUITTING:  The benefits of quitting start right away and keep improving the longer you go without smokin minutes: blood pressure and pulse return to normal  8 hours: oxygen levels return to normal  2 days: ability to smell and taste begins to improve as damaged nerves start to regrow  2-3 weeks: circulation and lung function improves  1-9 months: decreased cough, congestion and shortness of breath; less tired  1 year: risk of heart attack decreases by half  5 years: risk of lung cancer decreases by half; risk of stroke becomes the same as a non-smoker  For information about how to quit smoking, visit the following links:  [employer] [place] ,   Clearing the Air, Quit Smoking Today   - an online  booklet. http://www.smokefree.gov/pubs/clearing_the_air.pdf  Smokefree.gov http://smokefree.gov/  QuitNet http://www.quitnet.com/    4793-7923 Elisa Munson, [address] [place] [address], [place] [address]. All rights reserved. This information is not intended as a substitute for professional medical care. Always follow your healthcare professional's instructions.    The Benefits of Living Smoke Free  What do you want to gain from quitting? Check off some reasons to quit.  Health Benefits  ___ Reduce my risk of lung cancer, heart disease, chronic lung disease  ___ Have fewer wrinkles and softer skin  ___ Improve my sense of taste and smell  ___ For pregnant women--reduce the risk of having a miscarriage, stillbirth, premature birth, or low-birth-weight baby  Personal Benefits  ___ Feel more in control of my life  ___ Have better-smelling hair, breath, clothes, home, and car  ___ Save time by not having to take smoke breaks, buy cigarettes, or hunt for a light  ___ Have whiter teeth  Family Benefits  ___ Reduce my children s respiratory tract infections  ___ Set a good example for my children  ___ Reduce my family s cancer risk  Financial Benefits  ___ Save hundreds of dollars each year that would be spent on cigarettes  ___ Save money on medical bills  ___ Save on life, health, and car insurance premiums    Those Dollars Add Up!  Cigarettes are expensive, and getting more expensive all the time. Do you realize how much money you are spending on cigarettes per year? What is the average amount you spend on a pack of cigarettes? What is the average number of packs that you smoke per day? Using your answers to these questions, fill in this formula to help you find out:  ($ _____ per pack) ×  ( _____ number of packs per day) × (365 days) =  $ _____ yearly cost of smoking  Besides tobacco, there are other costs, including extra cleaning bills and replacement costs for clothing and furniture; medical expenses for  smoking-related illnesses; and higher health, life, and car insurance premiums.    Cigars and Pipes Count Too!  Cigars and pipes are also dangerous. So are smokeless (chewing) tobacco and snuff. All of these products contain nicotine, a highly addictive substance that has harmful effects on your body. Quitting smoking means giving up all tobacco products.      8153-8629 [facility], [address] [place] [address], [place] [address]. All rights reserved. This information is not intended as a substitute for professional medical care. Always follow your healthcare professional's instructions.

## 2019-04-24 NOTE — PROGRESS NOTES
SUBJECTIVE:  Marly Cannon, 53 year old, female presents with the following Chief Complaint(s) with HPI to follow:  Chief Complaint   Patient presents with     Diabetes        Diabetes Follow-up      Patient is checking blood sugars: 0.9x/day.  Results:  Highest: 328  Lowest: 230  Period average: 285    Values above, >180: 13  Values within goal (): 0  Values below goal, <70: 0      Symptoms of hypoglycemia (low blood sugar): none    Paresthesias (numbness or burning in feet) or sores: No    Diabetic eye exam within the last year: Yes    Breakfast eaten regularly: once in awhile     Patient counting carbs: Yes       HPI:  Marly's here today for the follow up regarding her Diabetes mellitus, Type 2.    Lab Results   Component Value Date    A1C 9.8 02/22/2019    A1C 10.9 11/21/2018    A1C 9.6 08/21/2018    A1C 9.6 08/21/2018    A1C 7.7 05/22/2018     Current Diabetes medication:   1.  Tresiba 75 units daily (supper time)--no missed doses  2.  Trulicity 0.75 mg weekly  3.  Novolog per sliding--was on hold  ASA use: yes, 81 mg daily  Statin use: yes, simvastatin 20 mg daily    Marly's here today for a meter download and possible insulin ajdust. Reports the following:   Denies any missed doses of insulin.  Denies any missed doses of Trulicity     Denies any new health concerns.        04/24/19 1315   General   Presents for Follow-up   Accompanied by Self   Diabetes education in the past 24mo Yes   Focus of Visit Monitoring;Taking Medication;Problem Solving  (follow up from Trulicity start)   Insulin Type NovoLog;Tresiba   Diabetes type Type 2   Disease course Getting harder to manage  (No change)   How confident are you filling out medical forms by yourself: Not at all   Transportation concerns No   Other concerns: Glasses;Cognitive impairment   Symptoms   Blurred vision No   Fatigue No   Neuropathy No   Foot ulcerations No   Polydipsia No   Polyphagia No   Polyuria No   Visual change No   Weakness No    Weight loss No   Slow healing wounds No   Recent Infection(s) No   Symptom course Stable   Weight trend Fluctuating minimally   Diabetic Complications   Autonomic neuropathy No   CVA No   Heart disease No   Nephropathy No   Peripheral neuropathy No   Peripheral Vascular Disease No   Retinopathy No   Sexual dysfunction No        04/24/19 1315   Healthy Eating   Healthy Eating Assessed Today Yes   Cultural/Anabaptist diet restrictions? No   Patient on a regular basis Skips meals regularly;Has an inconsistent intake of carbohydrates   Meal planning None   Meals include Breakfast;Dinner   Beverages Water;Coffee;Diet soda;Soda  (occasionally has a regular pop)   Has patient met with a dietitian in the past? Yes   Being Active   Being Active Assessed Today Yes   Exercise: Yes   Days per week of moderate to strenuous exercise (like a brisk walk) 3   On average, minutes per day of exercise at this level 60   How intense was your typical exercise?  Light (like stretching or slow walking)   Exercise Minutes per Week 180   Monitoring   Monitoring Assessed Today Yes   Did patient bring glucose meter to appointment?  Yes   Blood Glucose Meter One Touch   Home Glucose (Sugar) Monitoring 1-2 times per day  (occasionally tests before supper for NovoLOG dose)   Blood glucose trend Fluctuating dramtically   Low Glucose Range (mg/dL) >200   High Glucose Range (mg/dL) >200   Overall Range (mg/dL) >200   Taking Medication   Taking Medication Assessed Today Yes   Current Treatments Insulin Injections;Non-insulin Injectables   Dose schedule pre-dinner;pre-breakfast   Given by Patient   Injection/Infusion sites Abdomen   Problems taking diabetes medications regularly? Yes  (concerns about taking insulin correctly)   Diabetes medication side effects? No   Treatment Compliance All of the time  (as reported)   Problem Solving   Problem Solving Assessed Today Yes   Hypoglycemia Frequency Never   Patient carries a carbohydrate source Yes    Hypoglycemia symptoms   Confusion No   Dizziness or Light-Headedness No   Headaches No   Hunger No   Mood changes No   Nervousness/Anxiety No   Sleepiness No   Speech difficulty No   Sweats No   Tremors No   Hypoglycemia Complications   Seizures No   Blackouts No   Hospitalization No   Nocturnal hypoglycemia No   Required assistance No   Required glucagon injection No   Reducing Risks   Diabetes Risks Age over 45 years;Hyperlipidemia   CAD Risks Diabetes Mellitus;Dyslipidemia;Hypertension;Tobacco exposure;Obesity   Has dilated eye exam at least once a year? Yes   Healthy Coping   Healthy Coping Assessed Today Yes   Emotional response to diabetes Acceptance   Informal Support system: Significant other   Stage of change MAINTENANCE (Working to maintain change, with risk of relapse)   Difficulty affording diabetes management supplies? No   Support resources In-person Offerings       Patient Active Problem List   Diagnosis     Type 2 diabetes, HbA1c goal < 7% (H)     ACP (advance care planning)     Stage 3 chronic kidney disease (H)     Pulmonary emphysema (H)     Hyperparathyroidism due to renal insufficiency (H)     Morbid obesity (H)     Vitamin D deficiency     Intellectual disability     Breast fibrocystic disorder     Tobacco abuse     Microalbuminuria due to type 2 diabetes mellitus (H)     H/O colonoscopy       No past medical history on file.    Past Surgical History:   Procedure Laterality Date     COLONOSCOPY N/A 8/20/2015    Procedure: COLONOSCOPY;  Surgeon: Gentry Porter MD;  Location: [facility]     COLONOSCOPY N/A 9/21/2018    Procedure: COLONOSCOPY;  COLONOSCOPY WITH POLYPECTOMY;  Surgeon: Gentry Porter MD;  Location: [facility]       Family History   Problem Relation Age of Onset     Diabetes Mother      Diabetes Father      Hypertension Brother      Diabetes Sister        Social History     Tobacco Use     Smoking status: Current Every Day Smoker     Packs/day: 1.00     Years: 34.00     Pack  years: 34.00     Types: Cigarettes     Start date: 1/1/1979     Smokeless tobacco: Never Used     Tobacco comment: Declined QP 04/24/19   Substance Use Topics     Alcohol use: No     Alcohol/week: 0.0 oz       Current Outpatient Medications   Medication Sig Dispense Refill     Acetaminophen (TYLENOL PO) Take 325 mg by mouth every 6 hours as needed        AMLODIPINE BESYLATE PO Take 2.5 mg by mouth daily       ammonium lactate (LAC-HYDRIN) 12 % cream Apply topically 2 times daily       ASPIRIN EC PO Take 81 mg by mouth daily       blood glucose monitoring (NO BRAND SPECIFIED) test strip Use to test blood sugar 3 times daily or as directed. 300 strip 3     Cholecalciferol (VITAMIN D-3 PO) Take 2,000 Units by mouth daily       dulaglutide (TRULICITY) 0.75 MG/0.5ML pen Inject 0.75 mg Subcutaneous every 7 days 2 mL 1     HYDROCHLOROTHIAZIDE PO Take 12.5 mg by mouth daily        hydrocortisone 2.5 % cream Apply topically 2 times daily       insulin aspart (NOVOLOG PEN) 100 UNIT/ML pen Inject 5 units if BG is greater than 200; inject 10 units if BG is greater than 300 before meals.  TDD: 30 units 15 mL 3     insulin degludec (TRESIBA FLEXTOUCH) 200 UNIT/ML pen Inject 80 Units Subcutaneous daily 18 mL 3     insulin pen needle (32G X 4 MM) 32G X 4 MM miscellaneous Use 1 pen needles daily 100 each 3     Ipratropium-Albuterol (COMBIVENT RESPIMAT)  MCG/ACT inhaler Inhale 1 puff into the lungs 4 times daily       LISINOPRIL PO Take 40 mg by mouth daily       ONETOUCH DELICA LANCETS 33G MISC 1 each 3 times daily 100 each 10     PRIMIDONE PO Take by mouth At Bedtime       SIMVASTATIN PO Take 20 mg by mouth At Bedtime        triamcinolone (KENALOG) 0.1 % cream Apply topically 2 times daily       Cyclobenzaprine HCl (FLEXERIL PO) Take 10 mg by mouth 3 times daily as needed for muscle spasms         No Known Allergies    REVIEW OF SYSTEMS  Skin: negative  Eyes: negative  Ears/Nose/Throat: negative  Respiratory: No shortness of  "breath, dyspnea on exertion, cough, or hemoptysis  Cardiovascular: negative  Gastrointestinal: negative  Genitourinary: negative  Musculoskeletal: negative  Neurologic: negative; history of headaches  Psychiatric: negative  Hematologic/Lymphatic/Immunologic: negative  Endocrine: positive for diabetes    OBJECTIVE:  /71   Pulse 99   Resp 20   Ht 1.626 m (5' 4\")   Wt 104.8 kg (231 lb 1.6 oz)   SpO2 (!) 86%   BMI 39.67 kg/m    Constitutional: alert and no distress  Cardiovascular: RRR. Murmur noted.  No clicks gallops or rub  Respiratory:  Good diaphragmatic excursion. Lungs clear  Psychiatric: mentation appears normal for baseline and affect normal/bright    LABS  Results for orders placed or performed in visit on 19   GLUCOSE MONITOR, 72 HOUR, PHYS INTERP    Narrative    Findings:  Ave. S (last download: 320)      Date: 3/28/19    Ave. Glucose: 329   Time in target: 0%  Time below target: 0%  Time above target: 100%    Date: 3/29    Ave. Glucose: 314   Time in target: 0%  Time below target: 0%  Time above target: 100%    Date: 3/30    Ave. Glucose: 356   Time in target: 0%  Time below target: 0%  Time above target: 100%    Date: 3/31    Ave. Glucose: 325   Time in target: 0%  Time below target: 0%  Time above target: 100%    Date:     Ave. Glucose: 318   Time in target: 0%  Time below target: 0%  Time above target: 100%    Date:     Ave. Glucose: 319   Time in target: 0%  Time below target: 0%  Time above target: 100%    Date: 4/3    Ave. Glucose: 293   Time in target: 0%  Time below target: 0%  Time above target: 100%    Date:     Ave. Glucose: 288   Time in target: 0%  Time below target: 0%  Time above target: 100%    Date:     Ave. Glucose: 245   Time in target: 51%  Time below target: 0%  Time above target: 49%    Distribution Data:  Percentage above 180: 92%  Percentage within : 8%  Percentage below 70: 0%    Hypoglycemia incidence:  none    Food choices/Carbohydrate " counting:  Didn't keep food logs    Exercise:  walks    Recommendations:  Stop Glipizide and start Trulicity   TSH with free T4 reflex   Result Value Ref Range    TSH 1.16 0.40 - 4.00 mU/L   Basic metabolic panel   Result Value Ref Range    Sodium 136 133 - 144 mmol/L    Potassium 3.7 3.4 - 5.3 mmol/L    Chloride 98 94 - 109 mmol/L    Carbon Dioxide 33 (H) 20 - 32 mmol/L    Anion Gap 5 3 - 14 mmol/L    Glucose 208 (H) 70 - 99 mg/dL    Urea Nitrogen 19 7 - 30 mg/dL    Creatinine 1.21 (H) 0.52 - 1.04 mg/dL    GFR Estimate 50 (L) >60 mL/min/[1.73_m2]    GFR Estimate If Black 58 (L) >60 mL/min/[1.73_m2]    Calcium 9.7 8.5 - 10.1 mg/dL       ASSESSMENT / PLAN:  (E11.65,  Z79.4) Type 2 diabetes mellitus with hyperglycemia, with long-term current use of insulin (H)  (primary encounter diagnosis)  Comment: noted  Plan: insulin degludec (TRESIBA FLEXTOUCH) 200         UNIT/ML pen          Will increase Tresiba to 80 units daily  Will add Novolog back into regime (wasn't sure how Trulicity was going to affect her BGs so this was on hold)    (Z72.0) Tobacco abuse  Comment: noted and refused  Plan: Tobacco Cessation - Order to Satisfy Health         Maintenance          Marly's aware to let us know when she's ready to quit smoking.  Discussed the dangers of smoking with diabetes      (Z71.6) Tobacco abuse counseling  Comment: noted and refused  Plan: Tobacco Cessation - Order to Satisfy Health         Maintenance          As noted above    (E11.9,  Z79.4) IDDM (insulin dependent diabetes mellitus) (H)  Comment: noted  Plan: insulin aspart (NOVOLOG PEN) 100 UNIT/ML pen          Adjustments of insulin as discussed      Patient Instructions   Continue working on healthy eating and moving (start low and slow, work up to 30 min, 5x/week)    BG goals:  Fasting and before meals <130, >70  2 hour after eating <180    We only need 1/2 of these numbers to be within target then your A1c will be within target    Medication changes   1.   Trulicity  Every Friday, keep taking 0.75 mg    2.  Novolog  5 units before meals if BG is greater than 200 (OR if you are eating and didn't check a BG)     10 units if BG is greater than 300 before meals     If you are sweaty, dizzy, shaky or lightheaded, please check a BG before injecting insulin    Follow up   Week of May 7th    Call me sooner if any problems/concerns and/or questions develop including consistent low BGs <70 or consistent high BGs >200  [phone] (Unit Coordinator)    [phone] (Nurse)         HOW TO QUIT SMOKING  Smoking is one of the hardest habits to break. About half of all those who have ever smoked have been able to quit, and most of those (about 70%) who still smoke want to quit. Here are some of the best ways to stop smoking.     KEEP TRYING:  It takes most smokers about 8 tries before they are finally able to fully quit. So, the more often you try and fail, the better your chance of quitting the next time! So, don't give up!    GO COLD TURKEY:  Most ex-smokers quit cold turkey. Trying to cut back gradually doesn't seem to work as well, perhaps because it continues the smoking habit. Also, it is possible to fool yourself by inhaling more while smoking fewer cigarettes. This results in the same amount of nicotine in your body!    GET SUPPORT:  Support programs can make an important difference, especially for the heavy smoker. These groups offer lectures, methods to change your behavior and peer support. Call the free national Quitline for more information. 800-QUIT-NOW (065-961-1652). Low-cost or free programs are offered by many hospitals, local chapters of the American Lung Association (351-727-9021) and the American Cancer Society (694-920-2452). Support at home is important too. Non-smokers can help by offering praise and encouragement. If the smoker fails to quit, encourage them to try again!    OVER-THE-COUNTER MEDICINES:  For those who can't quit on their own, Nicotine  Replacement Therapy (NRT) may make quitting much easier. Certain aids such as the nicotine patch, gum and lozenge are available without a prescription. However, it is best to use these under the guidance of your doctor. The skin patch provides a steady supply of nicotine to the body. Nicotine gum and lozenge gives temporary bursts of low levels of nicotine. Both methods take the edge off the craving for cigarettes. WARNING: If you feel symptoms of nicotine overdose, such as nausea, vomiting, dizziness, weakness, or fast heartbeat, stop using these and see your doctor.    PRESCRIPTION MEDICINES:  After evaluating your smoking patterns and prior attempts at quitting, your doctor may offer a prescription medicine such as bupropion (Zyban, Wellbutrin), varenicline (Chantix, Champix), a niocotine inhaler or nasal spray. Each has its unique advantage and side effects which your doctor can review with you.    HEALTH BENEFITS OF QUITTING:  The benefits of quitting start right away and keep improving the longer you go without smokin minutes: blood pressure and pulse return to normal  8 hours: oxygen levels return to normal  2 days: ability to smell and taste begins to improve as damaged nerves start to regrow  2-3 weeks: circulation and lung function improves  1-9 months: decreased cough, congestion and shortness of breath; less tired  1 year: risk of heart attack decreases by half  5 years: risk of lung cancer decreases by half; risk of stroke becomes the same as a non-smoker  For information about how to quit smoking, visit the following links:  [employer] ,   Clearing the Air, Quit Smoking Today   - an online booklet. http://www.smokefree.gov/pubs/clearing_the_air.pdf  Smokefree.gov http://smokefree.gov/  QuitNet http://www.quitnet.com/    1152-1313 Elisa Munson, [address] [place] [address], [place] [address]. All rights reserved. This information is not intended as a substitute for professional medical  care. Always follow your healthcare professional's instructions.    The Benefits of Living Smoke Free  What do you want to gain from quitting? Check off some reasons to quit.  Health Benefits  ___ Reduce my risk of lung cancer, heart disease, chronic lung disease  ___ Have fewer wrinkles and softer skin  ___ Improve my sense of taste and smell  ___ For pregnant women--reduce the risk of having a miscarriage, stillbirth, premature birth, or low-birth-weight baby  Personal Benefits  ___ Feel more in control of my life  ___ Have better-smelling hair, breath, clothes, home, and car  ___ Save time by not having to take smoke breaks, buy cigarettes, or hunt for a light  ___ Have whiter teeth  Family Benefits  ___ Reduce my children s respiratory tract infections  ___ Set a good example for my children  ___ Reduce my family s cancer risk  Financial Benefits  ___ Save hundreds of dollars each year that would be spent on cigarettes  ___ Save money on medical bills  ___ Save on life, health, and car insurance premiums    Those Dollars Add Up!  Cigarettes are expensive, and getting more expensive all the time. Do you realize how much money you are spending on cigarettes per year? What is the average amount you spend on a pack of cigarettes? What is the average number of packs that you smoke per day? Using your answers to these questions, fill in this formula to help you find out:  ($ _____ per pack) ×  ( _____ number of packs per day) × (365 days) =  $ _____ yearly cost of smoking  Besides tobacco, there are other costs, including extra cleaning bills and replacement costs for clothing and furniture; medical expenses for smoking-related illnesses; and higher health, life, and car insurance premiums.    Cigars and Pipes Count Too!  Cigars and pipes are also dangerous. So are smokeless (chewing) tobacco and snuff. All of these products contain nicotine, a highly addictive substance that has harmful effects on your body. Quitting  smoking means giving up all tobacco products.      6447-2277 [facility] [place], [address] [place] [address], [place] [address]. All rights reserved. This information is not intended as a substitute for professional medical care. Always follow your healthcare professional's instructions.      Time: 40 minutes  Barrier: see [facility]  Willingness to learn: accepting    Courtney PINTO [facility]  Disease Management    Cc: Amberly Whyte NP      With the electronic record, we can now more quickly and easily track our patient diabetic goals. Our diabetes clinical review is in progress and these are the indicators we are monitoring for good diabetes health.     1.) HbA1C less than 7 (measurement of your average blood sugars)  2.) Blood Pressure less than 140/80  3.) LDL less than 100 (bad cholesterol)  4.) HbA1C is checked in the last 6 months and below 7% (more frequently if not at goal or adjusting medications)  5.) LDL is checked in the last 12 months (more frequently if not at goal or adjusting medications)  6.) Taking one baby aspirin daily (unless otherwise instructed)  7.) No tobacco use  8) Statin use     You have achieved 6 out of 8 of these and I am encouraging you to come in and get tuned up to achieve 8 out of 8!  Here is what you have achieved so far in my goals for you:  1.) HbA1C  less than 7:                              NO  Your last  HbA1C :  Lab Results   Component Value Date    A1C 9.8 02/22/2019    A1C 10.9 11/21/2018    A1C 9.6 08/21/2018    A1C 9.6 08/21/2018    A1C 7.7 05/22/2018     2.) Blood Pressure less than 140/80:       YES      Your last    BP Readings from Last 1 Encounters:   04/24/19 92/62     3.) LDL less than 100:                              YES      Your last     LDL Cholesterol Calculated   Date Value Ref Range Status   02/22/2019 86 100 mg/dL Final       4.) Checked HbA1C in the past 6 months: YES      5.) Checked LDL in the past 12 months:    YES      6.) Taking one aspirin daily:                        YES     7.) No tobacco use:                                        NO   8.) Statin use      YES

## 2019-04-24 NOTE — PROGRESS NOTES
"Chief Complaint   Patient presents with     Diabetes       Initial /71   Pulse 99   Resp 20   Ht 1.626 m (5' 4\")   Wt 104.8 kg (231 lb 1.6 oz)   SpO2 (!) 86%   BMI 39.67 kg/m   Estimated body mass index is 39.67 kg/m  as calculated from the following:    Height as of this encounter: 1.626 m (5' 4\").    Weight as of this encounter: 104.8 kg (231 lb 1.6 oz).  Medication Reconciliation: complete    Gege Walter LPN    "

## 2019-04-25 ASSESSMENT — ANXIETY QUESTIONNAIRES: GAD7 TOTAL SCORE: 1

## 2019-05-08 ENCOUNTER — ALLIED HEALTH/NURSE VISIT (OUTPATIENT)
Dept: EDUCATION SERVICES | Facility: OTHER | Age: 54
End: 2019-05-08
Attending: NURSE PRACTITIONER
Payer: MEDICARE

## 2019-05-08 VITALS
WEIGHT: 229.3 LBS | HEIGHT: 64 IN | OXYGEN SATURATION: 91 % | DIASTOLIC BLOOD PRESSURE: 82 MMHG | SYSTOLIC BLOOD PRESSURE: 116 MMHG | BODY MASS INDEX: 39.15 KG/M2 | HEART RATE: 87 BPM

## 2019-05-08 DIAGNOSIS — Z72.0 TOBACCO ABUSE: ICD-10-CM

## 2019-05-08 DIAGNOSIS — Z71.6 TOBACCO ABUSE COUNSELING: ICD-10-CM

## 2019-05-08 DIAGNOSIS — E11.65 TYPE 2 DIABETES MELLITUS WITH HYPERGLYCEMIA, WITH LONG-TERM CURRENT USE OF INSULIN (H): Primary | ICD-10-CM

## 2019-05-08 DIAGNOSIS — Z79.4 TYPE 2 DIABETES MELLITUS WITH HYPERGLYCEMIA, WITH LONG-TERM CURRENT USE OF INSULIN (H): Primary | ICD-10-CM

## 2019-05-08 PROCEDURE — G0463 HOSPITAL OUTPT CLINIC VISIT: HCPCS | Performed by: NURSE PRACTITIONER

## 2019-05-08 PROCEDURE — 99213 OFFICE O/P EST LOW 20 MIN: CPT | Performed by: NURSE PRACTITIONER

## 2019-05-08 ASSESSMENT — MIFFLIN-ST. JEOR: SCORE: 1620.1

## 2019-05-08 ASSESSMENT — PAIN SCALES - GENERAL: PAINLEVEL: NO PAIN (0)

## 2019-05-08 NOTE — PROGRESS NOTES
SUBJECTIVE:  Marly Cannon, 53 year old, female presents with the following Chief Complaint(s) with HPI to follow:  Chief Complaint   Patient presents with     Diabetes     BG review        Diabetes Follow-up      Patient is checking blood sugars: 0.6x/day.  Results:  Highest: 448  Lowest: 261  Period average: 323    Values above, >180: 8  Values within goal (): 0  Values below goal, <70: 0      Symptoms of hypoglycemia (low blood sugar): none    Paresthesias (numbness or burning in feet) or sores: No    Diabetic eye exam within the last year: Yes    Breakfast eaten regularly: once in awhile     Patient counting carbs: Yes       HPI:  Marly's here today for the follow up regarding her Diabetes mellitus, Type 2.    Lab Results   Component Value Date    A1C 9.8 02/22/2019    A1C 10.9 11/21/2018    A1C 9.6 08/21/2018    A1C 9.6 08/21/2018    A1C 7.7 05/22/2018     Current Diabetes medication:   1.  Tresiba 76 units daily (supper time)--reports no missed doses  2.  Trulicity 0.75 mg weekly--report no missed doses  3.  Novolog per sliding--report no missed doses  10-15 units before meals  ASA use: yes, 81 mg daily  Statin use: yes, simvastatin 20 mg daily    Marly's here today for a meter download and possible insulin ajdust. Reports the following:   Denies any missed doses of insulin.  Denies any missed doses of Trulicity     Denies any new health concerns.   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 No  pertinent past medical history.    Past Surgical History:   Procedure Laterality Date     COLONOSCOPY N/A 8/20/2015    Procedure: COLONOSCOPY;  Surgeon: Gentry Porter MD;  Location: [facility]     COLONOSCOPY N/A 9/21/2018    Procedure: COLONOSCOPY;  COLONOSCOPY WITH POLYPECTOMY;  Surgeon: Gentry Porter MD;  Location: [facility]       Family History   Problem Relation Age of Onset     Diabetes Mother      Diabetes Father      Hypertension Brother      Diabetes Sister        Social History     Tobacco Use     Smoking status: Current Every Day Smoker     Packs/day: 1.00     Years: 34.00     Pack years: 34.00     Types: Cigarettes     Start date: 1/1/1979     Smokeless tobacco: Never Used     Tobacco comment: Declined QP 04/24/19   Substance Use Topics     Alcohol use: No     Alcohol/week: 0.0 oz       Current Outpatient Medications   Medication Sig Dispense Refill     Acetaminophen (TYLENOL PO) Take 325 mg by mouth every 6 hours as needed        AMLODIPINE BESYLATE PO Take 2.5 mg by mouth daily       ammonium lactate (LAC-HYDRIN) 12 % cream Apply topically 2 times daily       ASPIRIN EC PO Take 81 mg by mouth daily       blood glucose monitoring (NO BRAND SPECIFIED) test strip Use to test blood sugar 3 times daily or as directed. 300 strip 3     Cholecalciferol (VITAMIN D-3 PO) Take 2,000 Units by mouth daily       Cyclobenzaprine HCl (FLEXERIL PO) Take 10 mg by mouth 3 times daily as needed for muscle spasms       dulaglutide (TRULICITY) 1.5 MG/0.5ML pen Inject 1.5 mg Subcutaneous every 7 days 2 mL 3     HYDROCHLOROTHIAZIDE PO Take 12.5 mg by mouth daily        hydrocortisone 2.5 % cream Apply topically 2 times daily       insulin aspart (NOVOLOG PEN) 100 UNIT/ML pen Inject 5 units if BG is greater than 200; inject 10 units if BG is greater than 300 before meals.  TDD: 30 units 15 mL 3     insulin degludec (TRESIBA FLEXTOUCH) 200 UNIT/ML pen Inject 84 Units Subcutaneous daily 18 mL 3     insulin pen needle (32G X  "4 MM) 32G X 4 MM miscellaneous Use 1 pen needles daily 100 each 3     Ipratropium-Albuterol (COMBIVENT RESPIMAT)  MCG/ACT inhaler Inhale 1 puff into the lungs 4 times daily       LISINOPRIL PO Take 40 mg by mouth daily       ONETOUCH DELICA LANCETS 33G MISC 1 each 3 times daily 100 each 10     PRIMIDONE PO Take by mouth At Bedtime       SIMVASTATIN PO Take 20 mg by mouth At Bedtime        triamcinolone (KENALOG) 0.1 % cream Apply topically 2 times daily         No Known Allergies    REVIEW OF SYSTEMS  Skin: negative  Eyes: negative  Ears/Nose/Throat: negative  Respiratory: No shortness of breath, dyspnea on exertion, cough, or hemoptysis  Cardiovascular: negative  Gastrointestinal: negative  Genitourinary: negative  Musculoskeletal: negative  Neurologic: negative; history of headaches  Psychiatric: negative  Hematologic/Lymphatic/Immunologic: negative  Endocrine: positive for diabetes    OBJECTIVE:  /82 (BP Location: Left arm, Patient Position: Sitting, Cuff Size: Adult Large)   Pulse 87   Ht 1.626 m (5' 4\")   Wt 104 kg (229 lb 4.8 oz)   SpO2 91%   BMI 39.36 kg/m    Constitutional: alert and no distress  Cardiovascular: RRR. Murmur noted.  No clicks gallops or rub  Respiratory:  Good diaphragmatic excursion. Lungs clear  Psychiatric: mentation appears normal for baseline and affect normal/bright    LABS  Results for orders placed or performed in visit on 19   GLUCOSE MONITOR, 72 HOUR, PHYS INTERP    Narrative    Findings:  Ave. S (last download: 320)      Date: 3/28/19    Ave. Glucose: 329   Time in target: 0%  Time below target: 0%  Time above target: 100%    Date: 3/29    Ave. Glucose: 314   Time in target: 0%  Time below target: 0%  Time above target: 100%    Date: 3/30    Ave. Glucose: 356   Time in target: 0%  Time below target: 0%  Time above target: 100%    Date: 3/31    Ave. Glucose: 325   Time in target: 0%  Time below target: 0%  Time above target: 100%    Date:     Ave. " Glucose: 318   Time in target: 0%  Time below target: 0%  Time above target: 100%    Date: 4/2    Ave. Glucose: 319   Time in target: 0%  Time below target: 0%  Time above target: 100%    Date: 4/3    Ave. Glucose: 293   Time in target: 0%  Time below target: 0%  Time above target: 100%    Date: 4/4    Ave. Glucose: 288   Time in target: 0%  Time below target: 0%  Time above target: 100%    Date: 4/5    Ave. Glucose: 245   Time in target: 51%  Time below target: 0%  Time above target: 49%    Distribution Data:  Percentage above 180: 92%  Percentage within : 8%  Percentage below 70: 0%    Hypoglycemia incidence:  none    Food choices/Carbohydrate counting:  Didn't keep food logs    Exercise:  walks    Recommendations:  Stop Glipizide and start Trulicity   TSH with free T4 reflex   Result Value Ref Range    TSH 1.16 0.40 - 4.00 mU/L   Basic metabolic panel   Result Value Ref Range    Sodium 136 133 - 144 mmol/L    Potassium 3.7 3.4 - 5.3 mmol/L    Chloride 98 94 - 109 mmol/L    Carbon Dioxide 33 (H) 20 - 32 mmol/L    Anion Gap 5 3 - 14 mmol/L    Glucose 208 (H) 70 - 99 mg/dL    Urea Nitrogen 19 7 - 30 mg/dL    Creatinine 1.21 (H) 0.52 - 1.04 mg/dL    GFR Estimate 50 (L) >60 mL/min/[1.73_m2]    GFR Estimate If Black 58 (L) >60 mL/min/[1.73_m2]    Calcium 9.7 8.5 - 10.1 mg/dL       ASSESSMENT / PLAN:  (E11.65,  Z79.4) Type 2 diabetes mellitus with hyperglycemia, with long-term current use of insulin (H)  (primary encounter diagnosis)  Comment:   BGs continue to be above target  Reviewed how to administer her Tresiba  Continue to tell me that she's not missing any doses.   I'm not sure what's going on    Plan: dulaglutide (TRULICITY) 1.5 MG/0.5ML pen,         insulin degludec (TRESIBA FLEXTOUCH) 200         UNIT/ML pen          Will increase her Trulicity to the next step    (Z72.0) Tobacco abuse  Comment: noted and refused  Plan: Tobacco Cessation - Order to Satisfy Health         Maintenance          Deo  aware to let us know when she's ready to quit smoking.  Discussed the dangers of smoking with diabetes      (Z71.6) Tobacco abuse counseling  Comment: note and refused  Plan: Tobacco Cessation - Order to Satisfy Health         Maintenance          As mentioned above      Patient Instructions   Continue working on healthy eating and moving (start low and slow, work up to 30 min, 5x/week)    BG goals:  Fasting and before meals <130, >70  2 hour after eating <180    We only need 1/2 of these numbers to be within target then your A1c will be within target    Medication changes   1.  Trulicity  This Friday (May 10), take your last dose of Trulicity 0.75 mg    Starting next Friday (May 17), start takingTrulicity 1.5 mg every Friday    2.  Novolog before meals  Check BG  If you don't check your BG OR BG's 151-200: 5 units  201-300: 10 units  >300: 15 units     3.  Tresiba  Increase to 80 units daily    If you are sweaty, dizzy, shaky or lightheaded, please check a BG before injecting insulin    If you are interested in the [employer] continuous glucose monitor (never have to poke your finger to check your BG again), start checking your BG 4 times a day    Follow up   5/29/19: bring meter when you see Amberly Whyte NP  Beginning of June: appointment with myself    Call me sooner if any problems/concerns and/or questions develop including consistent low BGs <70 or consistent high BGs >200  [phone] (Unit Coordinator)    [phone] (Nurse)         HOW TO QUIT SMOKING  Smoking is one of the hardest habits to break. About half of all those who have ever smoked have been able to quit, and most of those (about 70%) who still smoke want to quit. Here are some of the best ways to stop smoking.     KEEP TRYING:  It takes most smokers about 8 tries before they are finally able to fully quit. So, the more often you try and fail, the better your chance of quitting the next time! So, don't give up!    GO COLD TURKEY:  Most ex-smokers  quit cold turkey. Trying to cut back gradually doesn't seem to work as well, perhaps because it continues the smoking habit. Also, it is possible to fool yourself by inhaling more while smoking fewer cigarettes. This results in the same amount of nicotine in your body!    GET SUPPORT:  Support programs can make an important difference, especially for the heavy smoker. These groups offer lectures, methods to change your behavior and peer support. Call the free national Quitline for more information. 800-QUIT-NOW (130-740-2563). Low-cost or free programs are offered by many hospitals, local chapters of the American Lung Association (234-743-7739) and the American Cancer Society (227-635-4334). Support at home is important too. Non-smokers can help by offering praise and encouragement. If the smoker fails to quit, encourage them to try again!    OVER-THE-COUNTER MEDICINES:  For those who can't quit on their own, Nicotine Replacement Therapy (NRT) may make quitting much easier. Certain aids such as the nicotine patch, gum and lozenge are available without a prescription. However, it is best to use these under the guidance of your doctor. The skin patch provides a steady supply of nicotine to the body. Nicotine gum and lozenge gives temporary bursts of low levels of nicotine. Both methods take the edge off the craving for cigarettes. WARNING: If you feel symptoms of nicotine overdose, such as nausea, vomiting, dizziness, weakness, or fast heartbeat, stop using these and see your doctor.    PRESCRIPTION MEDICINES:  After evaluating your smoking patterns and prior attempts at quitting, your doctor may offer a prescription medicine such as bupropion (Zyban, Wellbutrin), varenicline (Chantix, Champix), a niocotine inhaler or nasal spray. Each has its unique advantage and side effects which your doctor can review with you.    HEALTH BENEFITS OF QUITTING:  The benefits of quitting start right away and keep improving the longer  you go without smokin minutes: blood pressure and pulse return to normal  8 hours: oxygen levels return to normal  2 days: ability to smell and taste begins to improve as damaged nerves start to regrow  2-3 weeks: circulation and lung function improves  1-9 months: decreased cough, congestion and shortness of breath; less tired  1 year: risk of heart attack decreases by half  5 years: risk of lung cancer decreases by half; risk of stroke becomes the same as a non-smoker  For information about how to quit smoking, visit the following links:  National Cancer [place] ,   Clearing the Air, Quit Smoking Today   - an online booklet. http://www.smokefree.gov/pubs/clearing_the_air.pdf  Smokefree.gov http://smokefree.gov/  QuitNet http://www.quitnet.com/    1799-0746 Elisa Munson, [address] [place] [address]. All rights reserved. This information is not intended as a substitute for professional medical care. Always follow your healthcare professional's instructions.    The Benefits of Living Smoke Free  What do you want to gain from quitting? Check off some reasons to quit.  Health Benefits  ___ Reduce my risk of lung cancer, heart disease, chronic lung disease  ___ Have fewer wrinkles and softer skin  ___ Improve my sense of taste and smell  ___ For pregnant women--reduce the risk of having a miscarriage, stillbirth, premature birth, or low-birth-weight baby  Personal Benefits  ___ Feel more in control of my life  ___ Have better-smelling hair, breath, clothes, home, and car  ___ Save time by not having to take smoke breaks, buy cigarettes, or hunt for a light  ___ Have whiter teeth  Family Benefits  ___ Reduce my children s respiratory tract infections  ___ Set a good example for my children  ___ Reduce my family s cancer risk  Financial Benefits  ___ Save hundreds of dollars each year that would be spent on cigarettes  ___ Save money on medical bills  ___ Save on life, health, and car insurance  premiums    Those Dollars Add Up!  Cigarettes are expensive, and getting more expensive all the time. Do you realize how much money you are spending on cigarettes per year? What is the average amount you spend on a pack of cigarettes? What is the average number of packs that you smoke per day? Using your answers to these questions, fill in this formula to help you find out:  ($ _____ per pack) ×  ( _____ number of packs per day) × (365 days) =  $ _____ yearly cost of smoking  Besides tobacco, there are other costs, including extra cleaning bills and replacement costs for clothing and furniture; medical expenses for smoking-related illnesses; and higher health, life, and car insurance premiums.    Cigars and Pipes Count Too!  Cigars and pipes are also dangerous. So are smokeless (chewing) tobacco and snuff. All of these products contain nicotine, a highly addictive substance that has harmful effects on your body. Quitting smoking means giving up all tobacco products.      5551-1013 [place] [address]. All rights reserved. This information is not intended as a substitute for professional medical care. Always follow your healthcare professional's instructions.      Time: 40 minutes  Barrier: see [facility]  Willingness to learn: accepting    Courtney PINTO [facility]  Disease Management    Cc: Amberly Whyte NP      With the electronic record, we can now more quickly and easily track our patient diabetic goals. Our diabetes clinical review is in progress and these are the indicators we are monitoring for good diabetes health.     1.) HbA1C less than 7 (measurement of your average blood sugars)  2.) Blood Pressure less than 140/80  3.) LDL less than 100 (bad cholesterol)  4.) HbA1C is checked in the last 6 months and below 7% (more frequently if not at goal or adjusting medications)  5.) LDL is checked in the last 12 months (more frequently if not at goal or adjusting medications)  6.)  Taking one baby aspirin daily (unless otherwise instructed)  7.) No tobacco use  8) Statin use     You have achieved 6 out of 8 of these and I am encouraging you to come in and get tuned up to achieve 8 out of 8!  Here is what you have achieved so far in my goals for you:  1.) HbA1C  less than 7:                              NO  Your last  HbA1C :  Lab Results   Component Value Date    A1C 9.8 02/22/2019    A1C 10.9 11/21/2018    A1C 9.6 08/21/2018    A1C 9.6 08/21/2018    A1C 7.7 05/22/2018     2.) Blood Pressure less than 140/80:       YES      Your last    BP Readings from Last 1 Encounters:   05/08/19 116/82     3.) LDL less than 100:                              YES      Your last     LDL Cholesterol Calculated   Date Value Ref Range Status   02/22/2019 86 100 mg/dL Final       4.) Checked HbA1C in the past 6 months: YES      5.) Checked LDL in the past 12 months:    YES      6.) Taking one aspirin daily:                       YES     7.) No tobacco use:                                        NO   8.) Statin use      YES

## 2019-05-08 NOTE — PATIENT INSTRUCTIONS
Continue working on healthy eating and moving (start low and slow, work up to 30 min, 5x/week)    BG goals:  Fasting and before meals <130, >70  2 hour after eating <180    We only need 1/2 of these numbers to be within target then your A1c will be within target    Medication changes   1.  Trulicity  This Friday (May 10), take your last dose of Trulicity 0.75 mg    Starting next Friday (May 17), start takingTrulicity 1.5 mg every Friday    2.  Novolog before meals  Check BG  If you don't check your BG OR BG's 151-200: 5 units  201-300: 10 units  >300: 15 units     3.  Tresiba  Increase to 80 units daily    If you are sweaty, dizzy, shaky or lightheaded, please check a BG before injecting insulin    If you are interested in the [employer] continuous glucose monitor (never have to poke your finger to check your BG again), start checking your BG 4 times a day    Follow up   5/29/19: bring meter when you see Amberly Whyte NP  Beginning of June: appointment with myself    Call me sooner if any problems/concerns and/or questions develop including consistent low BGs <70 or consistent high BGs >200  [phone] (Unit Coordinator)    [phone] (Nurse)         HOW TO QUIT SMOKING  Smoking is one of the hardest habits to break. About half of all those who have ever smoked have been able to quit, and most of those (about 70%) who still smoke want to quit. Here are some of the best ways to stop smoking.     KEEP TRYING:  It takes most smokers about 8 tries before they are finally able to fully quit. So, the more often you try and fail, the better your chance of quitting the next time! So, don't give up!    GO COLD TURKEY:  Most ex-smokers quit cold turkey. Trying to cut back gradually doesn't seem to work as well, perhaps because it continues the smoking habit. Also, it is possible to fool yourself by inhaling more while smoking fewer cigarettes. This results in the same amount of nicotine in your body!    GET SUPPORT:  Support  programs can make an important difference, especially for the heavy smoker. These groups offer lectures, methods to change your behavior and peer support. Call the free national Quitline for more information. 800-QUIT-NOW (284-879-6945). Low-cost or free programs are offered by many hospitals, local chapters of the American Lung Association (373-532-9955) and the American Cancer Society (369-099-4557). Support at home is important too. Non-smokers can help by offering praise and encouragement. If the smoker fails to quit, encourage them to try again!    OVER-THE-COUNTER MEDICINES:  For those who can't quit on their own, Nicotine Replacement Therapy (NRT) may make quitting much easier. Certain aids such as the nicotine patch, gum and lozenge are available without a prescription. However, it is best to use these under the guidance of your doctor. The skin patch provides a steady supply of nicotine to the body. Nicotine gum and lozenge gives temporary bursts of low levels of nicotine. Both methods take the edge off the craving for cigarettes. WARNING: If you feel symptoms of nicotine overdose, such as nausea, vomiting, dizziness, weakness, or fast heartbeat, stop using these and see your doctor.    PRESCRIPTION MEDICINES:  After evaluating your smoking patterns and prior attempts at quitting, your doctor may offer a prescription medicine such as bupropion (Zyban, Wellbutrin), varenicline (Chantix, Champix), a niocotine inhaler or nasal spray. Each has its unique advantage and side effects which your doctor can review with you.    HEALTH BENEFITS OF QUITTING:  The benefits of quitting start right away and keep improving the longer you go without smokin minutes: blood pressure and pulse return to normal  8 hours: oxygen levels return to normal  2 days: ability to smell and taste begins to improve as damaged nerves start to regrow  2-3 weeks: circulation and lung function improves  1-9 months: decreased cough,  congestion and shortness of breath; less tired  1 year: risk of heart attack decreases by half  5 years: risk of lung cancer decreases by half; risk of stroke becomes the same as a non-smoker  For information about how to quit smoking, visit the following links:  National Cancer [place] ,   Clearing the Air, Quit Smoking Today   - an online booklet. http://www.smokefree.gov/pubs/clearing_the_air.pdf  Smokefree.gov http://smokefree.gov/  QuitNet http://www.quitnet.com/    6761-6459 Elisa Munson, [address] [place] [address]. All rights reserved. This information is not intended as a substitute for professional medical care. Always follow your healthcare professional's instructions.    The Benefits of Living Smoke Free  What do you want to gain from quitting? Check off some reasons to quit.  Health Benefits  ___ Reduce my risk of lung cancer, heart disease, chronic lung disease  ___ Have fewer wrinkles and softer skin  ___ Improve my sense of taste and smell  ___ For pregnant women--reduce the risk of having a miscarriage, stillbirth, premature birth, or low-birth-weight baby  Personal Benefits  ___ Feel more in control of my life  ___ Have better-smelling hair, breath, clothes, home, and car  ___ Save time by not having to take smoke breaks, buy cigarettes, or hunt for a light  ___ Have whiter teeth  Family Benefits  ___ Reduce my children s respiratory tract infections  ___ Set a good example for my children  ___ Reduce my family s cancer risk  Financial Benefits  ___ Save hundreds of dollars each year that would be spent on cigarettes  ___ Save money on medical bills  ___ Save on life, health, and car insurance premiums    Those Dollars Add Up!  Cigarettes are expensive, and getting more expensive all the time. Do you realize how much money you are spending on cigarettes per year? What is the average amount you spend on a pack of cigarettes? What is the average number of packs that you smoke per  day? Using your answers to these questions, fill in this formula to help you find out:  ($ _____ per pack) ×  ( _____ number of packs per day) × (365 days) =  $ _____ yearly cost of smoking  Besides tobacco, there are other costs, including extra cleaning bills and replacement costs for clothing and furniture; medical expenses for smoking-related illnesses; and higher health, life, and car insurance premiums.    Cigars and Pipes Count Too!  Cigars and pipes are also dangerous. So are smokeless (chewing) tobacco and snuff. All of these products contain nicotine, a highly addictive substance that has harmful effects on your body. Quitting smoking means giving up all tobacco products.      0147-7019 Elisa Munson, [address] [place] [address], [place] [address]. All rights reserved. This information is not intended as a substitute for professional medical care. Always follow your healthcare professional's instructions.

## 2019-05-28 ENCOUNTER — PATIENT OUTREACH (OUTPATIENT)
Dept: CARE COORDINATION | Facility: OTHER | Age: 54
End: 2019-05-28

## 2019-05-28 PROBLEM — D12.6 TUBULAR ADENOMA OF COLON: Status: ACTIVE | Noted: 2018-09-28

## 2019-05-28 PROBLEM — E78.5 HYPERLIPIDEMIA, UNSPECIFIED HYPERLIPIDEMIA TYPE: Status: ACTIVE | Noted: 2019-05-28

## 2019-05-28 PROBLEM — I10 ESSENTIAL HYPERTENSION: Status: ACTIVE | Noted: 2019-05-28

## 2019-05-28 PROBLEM — Z91.89 FRAMINGHAM CARDIAC RISK <10% IN NEXT 10 YEARS: Status: ACTIVE | Noted: 2019-02-22

## 2019-05-28 PROBLEM — E83.42 HYPOMAGNESEMIA: Status: ACTIVE | Noted: 2018-04-10

## 2019-05-28 NOTE — PROGRESS NOTES
Subjective     Marly Cannon is a 53 year old female who presents to clinic today for the following health issues:    She is due for a CP with pap. Will schedule.     HPI   Diabetes Follow-up    [facility]. Using Trulicity, Tresiba, and sliding scale Novolog. Denies any side effects. There has been compliance questions in the past, but patient states that she has been taking all medications as ordered.       How often are you checking your blood sugar?  Three times a day     What time of day are you checking your blood sugars (select all that apply)?  Before and after meals bedtime     Have you had any blood sugars above 200?  No    Have you had any blood sugars below 70?  No    What symptoms do you notice when your blood sugar is low?  Having no symptoms    What concerns do you have today about your diabetes? None     Do you have any of these symptoms? (Select all that apply)  No numbness or tingling in feet.  No redness, sores or blisters on feet.  No complaints of excessive thirst.  No reports of blurry vision.  No significant changes to weight.     Have you had a diabetic eye exam in the last 12 months? Yes- Date of last eye exam: unknown , plans to schedule with eye doctor    Diabetes Management Resources    Hyperlipidemia Follow-Up      Are you having any of the following symptoms? (Select all that apply)  Pain in calves when walking 1-2 blocks once in a while, rarely occurs    Are you regularly taking any medication or supplement to lower your cholesterol?   Yes- simvastatin    Are you having muscle aches or other side effects that you think could be caused by your cholesterol lowering medication?  No    Hypertension Follow-up      Do you check your blood pressure regularly outside of the clinic? No     Are you following a low salt diet? No    Are your blood pressures ever more than 140 on the top number (systolic) OR more   than 90 on the bottom number (diastolic), for  example 140/90? No     Currently taking lisinopril, amlodipine, and hydrochlorothiazide. Denies any side effects.     BP Readings from Last 2 Encounters:   05/29/19 118/80   05/08/19 116/82     Hemoglobin A1C (%)   Date Value   05/29/2019 11.0 (H)   02/22/2019 9.8 (A)     LDL Cholesterol Calculated (mg/dL)   Date Value   02/22/2019 86   04/29/2016 109       Amount of exercise or physical activity: 4-5 days/week for an average of 15-30 minutes    Problems taking medications regularly: No    Medication side effects: none    Diet: regular (no restrictions)    She does smoke. No interest in quitting.     Patient Active Problem List   Diagnosis     Type 2 diabetes, HbA1c goal < 7% (H)     ACP (advance care planning)     Stage 3 chronic kidney disease (H)     Pulmonary emphysema (H)     Hyperparathyroidism due to renal insufficiency (H)     Morbid obesity (H)     Vitamin D deficiency     Intellectual disability     Breast fibrocystic disorder     Tobacco abuse     Microalbuminuria due to type 2 diabetes mellitus (H)     H/O colonoscopy     [place] cardiac risk <10% in next 10 years     Hypomagnesemia     Tubular adenoma of colon     Hyperlipidemia, unspecified hyperlipidemia type     Essential hypertension     Past Surgical History:   Procedure Laterality Date     COLONOSCOPY N/A 8/20/2015    Procedure: COLONOSCOPY;  Surgeon: Gentry Porter MD;  Location: [facility]     COLONOSCOPY N/A 9/21/2018    Procedure: COLONOSCOPY;  COLONOSCOPY WITH POLYPECTOMY;  Surgeon: Gentry Porter MD;  Location: [facility]       Social History     Tobacco Use     Smoking status: Current Every Day Smoker     Packs/day: 1.00     Years: 34.00     Pack years: 34.00     Types: Cigarettes     Start date: 1/1/1979     Smokeless tobacco: Never Used     Tobacco comment: Declined QP 04/24/19   Substance Use Topics     Alcohol use: No     Alcohol/week: 0.0 oz     Family History   Problem Relation Age of Onset     Diabetes Mother      Diabetes  Father      Hypertension Brother      Diabetes Sister          Current Outpatient Medications   Medication Sig Dispense Refill     Acetaminophen (TYLENOL PO) Take 325 mg by mouth every 6 hours as needed        AMLODIPINE BESYLATE PO Take 2.5 mg by mouth daily       ammonium lactate (LAC-HYDRIN) 12 % cream Apply topically 2 times daily       ASPIRIN EC PO Take 81 mg by mouth daily       blood glucose monitoring (NO BRAND SPECIFIED) test strip Use to test blood sugar 3 times daily or as directed. 300 strip 3     Cholecalciferol (VITAMIN D-3 PO) Take 2,000 Units by mouth daily       Cyclobenzaprine HCl (FLEXERIL PO) Take 10 mg by mouth 3 times daily as needed for muscle spasms       dulaglutide (TRULICITY) 1.5 MG/0.5ML pen Inject 1.5 mg Subcutaneous every 7 days 2 mL 3     HYDROCHLOROTHIAZIDE PO Take 12.5 mg by mouth daily        hydrocortisone 2.5 % cream Apply topically 2 times daily       insulin aspart (NOVOLOG PEN) 100 UNIT/ML pen Inject 5 units if BG is greater than 200; inject 10 units if BG is greater than 300 before meals.  TDD: 30 units 15 mL 3     insulin degludec (TRESIBA FLEXTOUCH) 200 UNIT/ML pen Inject 84 Units Subcutaneous daily 18 mL 3     insulin pen needle (32G X 4 MM) 32G X 4 MM miscellaneous Use 1 pen needles daily 100 each 3     Ipratropium-Albuterol (COMBIVENT RESPIMAT)  MCG/ACT inhaler Inhale 1 puff into the lungs 4 times daily       LISINOPRIL PO Take 40 mg by mouth daily       ONETOUCH DELICA LANCETS 33G MISC 1 each 3 times daily 100 each 10     PRIMIDONE PO Take by mouth At Bedtime       SIMVASTATIN PO Take 20 mg by mouth At Bedtime        triamcinolone (KENALOG) 0.1 % cream Apply topically 2 times daily       LANTUS SOLOSTAR 100 UNIT/ML soln        lisinopril (PRINIVIL/ZESTRIL) 40 MG tablet 40 mg daily       No Known Allergies    Reviewed and updated as needed this visit by Provider         Review of Systems   ROS COMP: CONSTITUTIONAL: NEGATIVE for fever, chills, change in  weight  INTEGUMENTARY/SKIN: NEGATIVE for worrisome rashes, moles or lesions  EYES: NEGATIVE for vision changes or irritation  ENT/MOUTH: NEGATIVE for ear, mouth and throat problems  RESP: NEGATIVE for significant cough or SOB  CV: NEGATIVE for chest pain, palpitations or peripheral edema  GI: NEGATIVE for nausea, abdominal pain, heartburn, or change in bowel habits  ENDOCRINE: NEGATIVE for temperature intolerance, skin/hair changes  PSYCHIATRIC: NEGATIVE for changes in mood or affect      Objective    /80 (BP Location: Left arm, Patient Position: Chair, Cuff Size: Adult Large)   Pulse 85   Wt 102.1 kg (225 lb)   SpO2 92%   BMI 38.62 kg/m    Body mass index is 38.62 kg/m .  Physical Exam   GENERAL: alert, no distress and obese  EYES: Eyes grossly normal to inspection, PERRL and conjunctivae and sclerae normal  HENT: ear canals and TM's normal, nose and mouth without ulcers or lesions  NECK: no adenopathy, no asymmetry, masses, or scars and thyroid normal to palpation  RESP: lungs clear to auscultation - no rales, rhonchi or wheezes  CV: regular rate and rhythm, normal S1 S2, no S3 or S4, no murmur  PSYCH: mentation appears normal, affect normal/bright  Diabetic foot exam: normal DP and PT pulses, no trophic changes or ulcerative lesions and normal sensory exam, she does have calluses on both feet, follows with podiatry    Diagnostic Test Results:  Labs reviewed in Epic  Results for orders placed or performed in visit on 05/29/19 (from the past 24 hour(s))   Hemoglobin A1c   Result Value Ref Range    Hemoglobin A1C 11.0 (H) 0 - 5.6 %   Estimated Average Glucose   Result Value Ref Range    Estimated Average Glucose 269 mg/dL           Assessment & Plan   (E11.29,  R80.9,  Z79.4) Type 2 diabetes mellitus with microalbuminuria, with long-term current use of insulin (H)  (primary encounter diagnosis)  (F79) Intellectual disability  Comment: poorly controlled, A1C 11  Plan: [facility] the [facility]. Unsure  "if she is taking her DM medications correctly. Care Coordination involved. DM was better controlled when living in a group home. She, however, wants to live with her boyfriend. On statin. BP at goal. F/u 3 months with me. Will schedule eye exam.     (N18.3) Stage 3 chronic kidney disease (H)  Comment: stable, recent creatinine 1.21, GFR 58  Plan: Continue to avoid NSAIDs, get better control of DM and keep good control of BP, will also try to limit sodium intake and quit smoking    (E78.5) Hyperlipidemia, unspecified hyperlipidemia type  Comment: tolerating statin  Plan: Will continue.     (I10) Essential hypertension  Plan: Well controlled. Continue current medications. Encouraged daily exercise and a low sodium diet. Recommended checking BP's 2x/wk, call the clinic if consistantly s>140 or d>90. Follow up in 6 months.       (Z72.0) Tobacco abuse  (Z71.6) Tobacco abuse counseling  Comment: currently smoking 1.5 ppd, no interest in quitting at this time  Plan: Tobacco Cessation - Order to Satisfy Health Maintenance        Declines low dose chest CT.     (Z11.4) Screening for HIV (human immunodeficiency virus)  Plan: HIV Antigen Antibody Combo        Will notify patient of the results when available and intervene accordingly.     (Z11.59) Need for hepatitis C screening test  Plan: Hepatitis C antibody        Will notify patient of the results when available and intervene accordingly.     (L84) Callus of foot  Plan: Continue to see podiatry.       BMI:   Estimated body mass index is 38.62 kg/m  as calculated from the following:    Height as of 5/8/19: 1.626 m (5' 4\").    Weight as of this encounter: 102.1 kg (225 lb).   Weight management plan: Discussed healthy diet and exercise guidelines      Amberly Whyte NP  [facility] [place]        "

## 2019-05-28 NOTE — PROGRESS NOTES
Clinic Care Coordination Contact  Care Team Conversations    CC phoned Marly this morning to remind her to bring her glucometer to her appointment with Amberly Whyte NP tomorrow.  She verbalized understanding.    Reviewed appointment times with pt as well (lab first, then visit with provider).    CC will plan to attend appointment with pt and provider tomorrow if pt is agreeable to this.    Lauren Pipkin, BS-RN   CHF and General Care Coordinator  [phone] Option # 1

## 2019-05-29 ENCOUNTER — OFFICE VISIT (OUTPATIENT)
Dept: FAMILY MEDICINE | Facility: OTHER | Age: 54
End: 2019-05-29
Attending: NURSE PRACTITIONER
Payer: MEDICARE

## 2019-05-29 VITALS
WEIGHT: 225 LBS | DIASTOLIC BLOOD PRESSURE: 80 MMHG | HEART RATE: 85 BPM | OXYGEN SATURATION: 92 % | SYSTOLIC BLOOD PRESSURE: 118 MMHG | BODY MASS INDEX: 38.62 KG/M2

## 2019-05-29 DIAGNOSIS — N18.30 STAGE 3 CHRONIC KIDNEY DISEASE (H): ICD-10-CM

## 2019-05-29 DIAGNOSIS — Z79.4 TYPE 2 DIABETES MELLITUS WITH MICROALBUMINURIA, WITH LONG-TERM CURRENT USE OF INSULIN (H): ICD-10-CM

## 2019-05-29 DIAGNOSIS — E11.29 TYPE 2 DIABETES MELLITUS WITH MICROALBUMINURIA, WITH LONG-TERM CURRENT USE OF INSULIN (H): Primary | ICD-10-CM

## 2019-05-29 DIAGNOSIS — R80.9 TYPE 2 DIABETES MELLITUS WITH MICROALBUMINURIA, WITH LONG-TERM CURRENT USE OF INSULIN (H): ICD-10-CM

## 2019-05-29 DIAGNOSIS — F79 INTELLECTUAL DISABILITY: ICD-10-CM

## 2019-05-29 DIAGNOSIS — L84 CALLUS OF FOOT: ICD-10-CM

## 2019-05-29 DIAGNOSIS — Z79.4 TYPE 2 DIABETES MELLITUS WITH MICROALBUMINURIA, WITH LONG-TERM CURRENT USE OF INSULIN (H): Primary | ICD-10-CM

## 2019-05-29 DIAGNOSIS — Z72.0 TOBACCO ABUSE: ICD-10-CM

## 2019-05-29 DIAGNOSIS — Z11.59 NEED FOR HEPATITIS C SCREENING TEST: ICD-10-CM

## 2019-05-29 DIAGNOSIS — Z71.6 TOBACCO ABUSE COUNSELING: ICD-10-CM

## 2019-05-29 DIAGNOSIS — R80.9 TYPE 2 DIABETES MELLITUS WITH MICROALBUMINURIA, WITH LONG-TERM CURRENT USE OF INSULIN (H): Primary | ICD-10-CM

## 2019-05-29 DIAGNOSIS — E11.29 TYPE 2 DIABETES MELLITUS WITH MICROALBUMINURIA, WITH LONG-TERM CURRENT USE OF INSULIN (H): ICD-10-CM

## 2019-05-29 DIAGNOSIS — Z11.4 SCREENING FOR HIV (HUMAN IMMUNODEFICIENCY VIRUS): ICD-10-CM

## 2019-05-29 DIAGNOSIS — E78.5 HYPERLIPIDEMIA, UNSPECIFIED HYPERLIPIDEMIA TYPE: ICD-10-CM

## 2019-05-29 DIAGNOSIS — I10 ESSENTIAL HYPERTENSION: ICD-10-CM

## 2019-05-29 LAB
EST. AVERAGE GLUCOSE BLD GHB EST-MCNC: 269 MG/DL
HBA1C MFR BLD: 11 % (ref 0–5.6)

## 2019-05-29 PROCEDURE — G0463 HOSPITAL OUTPT CLINIC VISIT: HCPCS

## 2019-05-29 PROCEDURE — G0472 HEP C SCREEN HIGH RISK/OTHER: HCPCS | Mod: ZL | Performed by: NURSE PRACTITIONER

## 2019-05-29 PROCEDURE — 40000788 ZZHCL STATISTIC ESTIMATED AVERAGE GLUCOSE: Mod: ZL | Performed by: NURSE PRACTITIONER

## 2019-05-29 PROCEDURE — 36415 COLL VENOUS BLD VENIPUNCTURE: CPT | Mod: ZL | Performed by: NURSE PRACTITIONER

## 2019-05-29 PROCEDURE — 83036 HEMOGLOBIN GLYCOSYLATED A1C: CPT | Mod: ZL | Performed by: NURSE PRACTITIONER

## 2019-05-29 PROCEDURE — 99214 OFFICE O/P EST MOD 30 MIN: CPT | Performed by: NURSE PRACTITIONER

## 2019-05-29 PROCEDURE — 87389 HIV-1 AG W/HIV-1&-2 AB AG IA: CPT | Mod: ZL | Performed by: NURSE PRACTITIONER

## 2019-05-29 RX ORDER — LISINOPRIL 40 MG/1
40 TABLET ORAL DAILY
COMMUNITY
Start: 2019-05-09 | End: 2019-09-11

## 2019-05-29 RX ORDER — INSULIN GLARGINE 100 [IU]/ML
INJECTION, SOLUTION SUBCUTANEOUS
COMMUNITY
Start: 2018-12-26 | End: 2019-08-29 | Stop reason: ALTCHOICE

## 2019-05-29 ASSESSMENT — PAIN SCALES - GENERAL: PAINLEVEL: NO PAIN (0)

## 2019-05-29 NOTE — LETTER
My Depression Action Plan  Name: Marly Cannon   Date of Birth 1965  Date: 5/29/2019    My doctor: Amberly Whyte   My clinic: [facility] [place]  [place] [address]  [phone]          GREEN    ZONE   Good Control    What it looks like:     Things are going generally well. You have normal up s and down s. You may even feel depressed from time to time, but bad moods usually last less than a day.   What you need to do:  1. Continue to care for yourself (see self care plan)  2. Check your depression survival kit and update it as needed  3. Follow your physician s recommendations including any medication.  4. Do not stop taking medication unless you consult with your physician first.           YELLOW         ZONE Getting Worse    What it looks like:     Depression is starting to interfere with your life.     It may be hard to get out of bed; you may be starting to isolate yourself from others.    Symptoms of depression are starting to last most all day and this has happened for several days.     You may have suicidal thoughts but they are not constant.   What you need to do:     1. Call your care team, your response to treatment will improve if you keep your care team informed of your progress. Yellow periods are signs an adjustment may need to be made.     2. Continue your self-care, even if you have to fake it!    3. Talk to someone in your support network    4. Open up your depression survival kit           RED    ZONE Medical Alert - Get Help    What it looks like:     Depression is seriously interfering with your life.     You may experience these or other symptoms: You can t get out of bed most days, can t work or engage in other necessary activities, you have trouble taking care of basic hygiene, or basic responsibilities, thoughts of suicide or death that will not go away, self-injurious behavior.     What you need to do:  1. Call your care team and request  a same-day appointment. If they are not available (weekends or after hours) call your local crisis line, emergency room or 911.            Depression Self Care Plan / Survival Kit    Self-Care for Depression  Here s the deal. Your body and mind are really not as separate as most people think.  What you do and think affects how you feel and how you feel influences what you do and think. This means if you do things that people who feel good do, it will help you feel better.  Sometimes this is all it takes.  There is also a place for medication and therapy depending on how severe your depression is, so be sure to consult with your medical provider and/ or Behavioral Health Consultant if your symptoms are worsening or not improving.     In order to better manage my stress, I will:    Exercise  Get some form of exercise, every day. This will help reduce pain and release endorphins, the  feel good  chemicals in your brain. This is almost as good as taking antidepressants!  This is not the same as joining a gym and then never going! (they count on that by the way ) It can be as simple as just going for a walk or doing some gardening, anything that will get you moving.      Hygiene   Maintain good hygiene (Get out of bed in the morning, Make your bed, Brush your teeth, Take a shower, and Get dressed like you were going to work, even if you are unemployed).  If your clothes don't fit try to get ones that do.    Diet  I will strive to eat foods that are good for me, drink plenty of water, and avoid excessive sugar, caffeine, alcohol, and other mood-altering substances.  Some foods that are helpful in depression are: complex carbohydrates, B vitamins, flaxseed, fish or fish oil, fresh fruits and vegetables.    Psychotherapy  I agree to participate in Individual Therapy (if recommended).    Medication  If prescribed medications, I agree to take them.  Missing doses can result in serious side effects.  I understand that drinking  alcohol, or other illicit drug use, may cause potential side effects.  I will not stop my medication abruptly without first discussing it with my provider.    Staying Connected With Others  I will stay in touch with my friends, family members, and my primary care provider/team.    Use your imagination  Be creative.  We all have a creative side; it doesn t matter if it s oil painting, sand castles, or mud pies! This will also kick up the endorphins.    Witness Beauty  (AKA stop and smell the roses) Take a look outside, even in mid-winter. Notice colors, textures. Watch the squirrels and birds.     Service to others  Be of service to others.  There is always someone else in need.  By helping others we can  get out of ourselves  and remember the really important things.  This also provides opportunities for practicing all the other parts of the program.    Humor  Laugh and be silly!  Adjust your TV habits for less news and crime-drama and more comedy.    Control your stress  Try breathing deep, massage therapy, biofeedback, and meditation. Find time to relax each day.     My support system    Clinic Contact:  Phone number:    Contact 1:  Phone number:    Contact 2:  Phone number:    Judaism/:  Phone number:    Therapist:  Phone number:    Local crisis center:    Phone number:    Other community support:  Phone number:

## 2019-05-29 NOTE — NURSING NOTE
"Chief Complaint   Patient presents with     Diabetes       Initial /80 (BP Location: Left arm, Patient Position: Chair, Cuff Size: Adult Large)   Pulse 85   Wt 102.1 kg (225 lb)   SpO2 92%   BMI 38.62 kg/m   Estimated body mass index is 38.62 kg/m  as calculated from the following:    Height as of 5/8/19: 1.626 m (5' 4\").    Weight as of this encounter: 102.1 kg (225 lb).  Medication Reconciliation: complete    Karolina Camacho LPN  "

## 2019-05-29 NOTE — PATIENT INSTRUCTIONS

## 2019-05-30 LAB
HCV AB SERPL QL IA: NONREACTIVE
HIV 1+2 AB+HIV1 P24 AG SERPL QL IA: NONREACTIVE

## 2019-06-03 ENCOUNTER — PATIENT OUTREACH (OUTPATIENT)
Dept: CARE COORDINATION | Facility: OTHER | Age: 54
End: 2019-06-03

## 2019-06-03 NOTE — PROGRESS NOTES
Clinic Care Coordination Contact    Situation: Patient chart reviewed by care coordinator.    Background: CC unable to attend pt's appointment with Amberly Whyte NP on 5/29/19.  Provider is aware CC is attempting to connect with pt and assist with improving her DM management.      Assessment: Hgb A1C is 11.  Pt's DM is poorly controlled.  Did not bring meter to visit 5/29 despite reminder by CC.      Plan/Recommendations: Provider feels pt may benefit from additional assistance at home, especially with medication management.  It is unclear if pt has any services such as PCA or nursing.  CC understanding is that pt left the group home she was residing in to live with her boyfriend.  CC will plan to attend pt's next appointment with Courtney Chaudhary NP for DM follow-up.  Reviewed chart to try to determine if there is anything scanned indicating she has services but unable to locate any info.    Lauren Pipkin, BS-RN   CHF and General Care Coordinator  [phone] Option # 1

## 2019-06-11 DIAGNOSIS — I10 ESSENTIAL HYPERTENSION: ICD-10-CM

## 2019-06-11 DIAGNOSIS — E78.5 HYPERLIPIDEMIA, UNSPECIFIED HYPERLIPIDEMIA TYPE: Primary | ICD-10-CM

## 2019-06-11 NOTE — TELEPHONE ENCOUNTER
Last visit  5/29/19     Last refill historic       atorvastatin not on medication list she has simvastatin on medication list roseline advise

## 2019-06-12 ENCOUNTER — PATIENT OUTREACH (OUTPATIENT)
Dept: CARE COORDINATION | Facility: OTHER | Age: 54
End: 2019-06-12

## 2019-06-12 ENCOUNTER — ALLIED HEALTH/NURSE VISIT (OUTPATIENT)
Dept: EDUCATION SERVICES | Facility: OTHER | Age: 54
End: 2019-06-12
Attending: NURSE PRACTITIONER
Payer: MEDICARE

## 2019-06-12 VITALS
WEIGHT: 222.2 LBS | DIASTOLIC BLOOD PRESSURE: 76 MMHG | HEART RATE: 89 BPM | SYSTOLIC BLOOD PRESSURE: 113 MMHG | HEIGHT: 64 IN | BODY MASS INDEX: 37.94 KG/M2 | OXYGEN SATURATION: 90 %

## 2019-06-12 DIAGNOSIS — Z79.4 TYPE 2 DIABETES MELLITUS WITH COMPLICATION, WITH LONG-TERM CURRENT USE OF INSULIN (H): ICD-10-CM

## 2019-06-12 DIAGNOSIS — E11.65 TYPE 2 DIABETES MELLITUS WITH HYPERGLYCEMIA, WITH LONG-TERM CURRENT USE OF INSULIN (H): Primary | ICD-10-CM

## 2019-06-12 DIAGNOSIS — Z71.6 TOBACCO ABUSE COUNSELING: ICD-10-CM

## 2019-06-12 DIAGNOSIS — E11.8 TYPE 2 DIABETES MELLITUS WITH COMPLICATION, WITH LONG-TERM CURRENT USE OF INSULIN (H): ICD-10-CM

## 2019-06-12 DIAGNOSIS — Z79.899 MEDICATION MANAGEMENT: Primary | ICD-10-CM

## 2019-06-12 DIAGNOSIS — Z72.0 TOBACCO ABUSE: ICD-10-CM

## 2019-06-12 DIAGNOSIS — Z79.4 TYPE 2 DIABETES MELLITUS WITH HYPERGLYCEMIA, WITH LONG-TERM CURRENT USE OF INSULIN (H): Primary | ICD-10-CM

## 2019-06-12 DIAGNOSIS — N18.30 STAGE 3 CHRONIC KIDNEY DISEASE (H): ICD-10-CM

## 2019-06-12 PROCEDURE — 95250 CONT GLUC MNTR PHYS/QHP EQP: CPT | Performed by: NURSE PRACTITIONER

## 2019-06-12 PROCEDURE — 99213 OFFICE O/P EST LOW 20 MIN: CPT | Performed by: NURSE PRACTITIONER

## 2019-06-12 RX ORDER — ATORVASTATIN CALCIUM 40 MG/1
40 TABLET, FILM COATED ORAL DAILY
Qty: 90 TABLET | Refills: 3 | Status: SHIPPED | OUTPATIENT
Start: 2019-06-12 | End: 2020-06-17

## 2019-06-12 RX ORDER — ASPIRIN 81 MG/1
81 TABLET ORAL DAILY
Qty: 90 TABLET | Refills: 3 | Status: SHIPPED | OUTPATIENT
Start: 2019-06-12 | End: 2019-08-07

## 2019-06-12 ASSESSMENT — PAIN SCALES - GENERAL: PAINLEVEL: NO PAIN (0)

## 2019-06-12 ASSESSMENT — ACTIVITIES OF DAILY LIVING (ADL): DEPENDENT_IADLS:: INDEPENDENT

## 2019-06-12 ASSESSMENT — MIFFLIN-ST. JEOR: SCORE: 1587.89

## 2019-06-12 NOTE — PROGRESS NOTES
Clinic Care Coordination Contact    Clinic Care Coordination Contact  OUTREACH    Referral Information:  Referral Source: Care Team    Primary Diagnosis: Diabetes    Chief Complaint   Patient presents with     Clinic Care Coordination - Face To Face        Universal Utilization:   Clinic Utilization  Difficulty keeping appointments:: No  Compliance Concerns: Yes  No-Show Concerns: No  No PCP office visit in Past Year: No  Utilization    Last refreshed: 6/8/2019  3:56 PM:  Hospital Admissions 0           Last refreshed: 6/8/2019  3:56 PM:  ED Visits 0           Last refreshed: 6/8/2019  3:56 PM:  No Show Count (past year) 2              Current as of: 6/8/2019  3:56 PM          Clinical Concerns:  Current Medical Concerns:   Respiratory     Pulmonary emphysema (H)   Digestive     Morbid obesity (H)     Vitamin D deficiency     Tubular adenoma of colon   Endocrine     Type 2 diabetes, HbA1c goal < 7% (H)     Hyperparathyroidism due to renal insufficiency (H)     Microalbuminuria due to type 2 diabetes mellitus (H)     Hypomagnesemia     Hyperlipidemia, unspecified hyperlipidemia type   Circulatory     Essential hypertension   Musculoskeletal and Integumentary     Callus of foot   Urinary     Stage 3 chronic kidney disease (H)   Behavioral     Intellectual disability     Tobacco abuse   Other     Breast fibrocystic disorder     H/O colonoscopy     [place] cardiac risk <10% in next 10 years    ACP (advance care planning)     Current Behavioral Concerns: non-compliance  Education Provided to patient: none today- attended appointment with provider     Health Maintenance Reviewed:    Clinical Pathway: None today    Medication Management:  Pt manages her own medications currently.  Provider believes pt is struggling to remain compliant with medications.  Has only checked her blood glucose once in the past few weeks.  No good medication management strategy in place.     Functional Status:  Dependent ADLs::  Independent  Dependent IADLs:: Independent  Bed or wheelchair confined:: No  Mobility Status: Independent  Fallen 2 or more times in the past year?: No  Any fall with injury in the past year?: No    Living Situation:  Current living arrangement:: I live in a private home(with boyfriend)  Type of residence:: Apartment    Diet/Exercise/Sleep:  Diet:: Diabetic diet  Food Insecurity: No  Tube Feeding: No  Exercise:: Yes    Transportation:  Transportation concerns (GOAL):: No  Transportation means:: Public transportation     Psychosocial:  Catholic or spiritual beliefs that impact treatment:: No  Informal Support system:: Significant other     Financial/Insurance:   Financial/Insurance concerns (GOAL):: No    Resources and Interventions:  Current Resources: PCP; DRC; CC   Community Resources: [facility], Other (see comment)([facility])  Supplies used at home:: Diabetic Supplies  Equipment Currently Used at Home: glucometer    Referrals Placed: Home Care     Goals:   Goals        General    Improve diabetes management strategy (cc support, home care, pt self-management)             Patient/Caregiver understanding: Pt is agreeable to receiving support from care coordination.  She is reportedly living with her boyfriend in an apartment.  She reports she has been living in this arrangement for the past 3 years.  She is trying to work but is struggling given some of her health issues.  She reports having an episode of sweating when she was working at [employer] and they told her she couldn't go back there.    Previously pt had been living at a group home run by [facility].  When she moved out from there she had a PCA.  Pt reports the PCA she had got in trouble for stealing from [employer] and then never came back to work with her.    CC discussed a home care referral today.  Pt reports she is doing her own med setup but agrees to nursing for med setup, monitoring of insulin supplies, checking blood sugars, home diabetic med management  strategy.  She has MA so should be able to receive such services long-term.  The home care agency can then determine through their assessment of the home situation if the patient would benefit from additional supports.      *CC did phone pt in follow-up to inquire about the status of our referral to have her see nephrology as we are unable to locate a visit in Care Everywhere but can see orders.  LM for pt to call back at her convenience to discuss.       Future Appointments              In 1 month Amberly Whyte, NP [facility]    In 2 months Amberly Whyte, NP [facility]          Plan: Pt enrolled in care coordination.  Will provide support/resources as requested.  Will monitor for care plan updates and perform pt outreaches.  Will assure follow through with recommended specialty appointments and other referrals.    Lauren Pipkin, BS-RN   CHF and General Care Coordinator  [phone] Option # 1

## 2019-06-12 NOTE — PATIENT INSTRUCTIONS
Continue working on healthy eating and moving (start low and slow, work up to 30 min, 5x/week)    BG goals:  Fasting and before meals <130, >70  2 hour after eating <180    We only need 1/2 of these numbers to be within target then your A1c will be within target    Diabetes Medications    1.  Trulicity  Trulicity 1.5 mg every Friday    2.  Novolog before meals  If you don't take your BG or BG >70 before meals: 10 units  If BG is greater than 300: 15 units     3.  Tresiba  76 units daily at supper    If you are sweaty, dizzy, shaky or lightheaded, please check a BG before injecting insulin    If you are interested in the Bud continuous glucose monitor (never have to poke your finger to check your BG again), start checking your BG 4 times a day    Follow up   One week   2 weeks    Call me sooner if any problems/concerns and/or questions develop including consistent low BGs <70 or consistent high BGs >200  [phone] (Unit Coordinator)    [phone] (Nurse)           HOW TO QUIT SMOKING  Smoking is one of the hardest habits to break. About half of all those who have ever smoked have been able to quit, and most of those (about 70%) who still smoke want to quit. Here are some of the best ways to stop smoking.     KEEP TRYING:  It takes most smokers about 8 tries before they are finally able to fully quit. So, the more often you try and fail, the better your chance of quitting the next time! So, don't give up!    GO COLD TURKEY:  Most ex-smokers quit cold turkey. Trying to cut back gradually doesn't seem to work as well, perhaps because it continues the smoking habit. Also, it is possible to fool yourself by inhaling more while smoking fewer cigarettes. This results in the same amount of nicotine in your body!    GET SUPPORT:  Support programs can make an important difference, especially for the heavy smoker. These groups offer lectures, methods to change your behavior and peer support. Call the [facility]  Quitline for more information. 800-QUIT-NOW (864-357-0585). Low-cost or free programs are offered by many hospitals, local chapters of the American Lung Association (145-473-2566) and the American Cancer Society (245-186-5476). Support at home is important too. Non-smokers can help by offering praise and encouragement. If the smoker fails to quit, encourage them to try again!    OVER-THE-COUNTER MEDICINES:  For those who can't quit on their own, Nicotine Replacement Therapy (NRT) may make quitting much easier. Certain aids such as the nicotine patch, gum and lozenge are available without a prescription. However, it is best to use these under the guidance of your doctor. The skin patch provides a steady supply of nicotine to the body. Nicotine gum and lozenge gives temporary bursts of low levels of nicotine. Both methods take the edge off the craving for cigarettes. WARNING: If you feel symptoms of nicotine overdose, such as nausea, vomiting, dizziness, weakness, or fast heartbeat, stop using these and see your doctor.    PRESCRIPTION MEDICINES:  After evaluating your smoking patterns and prior attempts at quitting, your doctor may offer a prescription medicine such as bupropion (Zyban, Wellbutrin), varenicline (Chantix, Champix), a niocotine inhaler or nasal spray. Each has its unique advantage and side effects which your doctor can review with you.    HEALTH BENEFITS OF QUITTING:  The benefits of quitting start right away and keep improving the longer you go without smokin minutes: blood pressure and pulse return to normal  8 hours: oxygen levels return to normal  2 days: ability to smell and taste begins to improve as damaged nerves start to regrow  2-3 weeks: circulation and lung function improves  1-9 months: decreased cough, congestion and shortness of breath; less tired  1 year: risk of heart attack decreases by half  5 years: risk of lung cancer decreases by half; risk of stroke becomes the same as a  non-smoker  For information about how to quit smoking, visit the following links:  National Cancer [place] ,   Clearing the Air, Quit Smoking Today   - an online booklet. http://www.smokefree.gov/pubs/clearing_the_air.pdf  Smokefree.gov http://smokefree.gov/  QuitNet http://www.quitnet.com/    6293-3631 Elisa Mnuson, [address] [place] [address], [place] [address]. All rights reserved. This information is not intended as a substitute for professional medical care. Always follow your healthcare professional's instructions.    The Benefits of Living Smoke Free  What do you want to gain from quitting? Check off some reasons to quit.  Health Benefits  ___ Reduce my risk of lung cancer, heart disease, chronic lung disease  ___ Have fewer wrinkles and softer skin  ___ Improve my sense of taste and smell  ___ For pregnant women--reduce the risk of having a miscarriage, stillbirth, premature birth, or low-birth-weight baby  Personal Benefits  ___ Feel more in control of my life  ___ Have better-smelling hair, breath, clothes, home, and car  ___ Save time by not having to take smoke breaks, buy cigarettes, or hunt for a light  ___ Have whiter teeth  Family Benefits  ___ Reduce my children s respiratory tract infections  ___ Set a good example for my children  ___ Reduce my family s cancer risk  Financial Benefits  ___ Save hundreds of dollars each year that would be spent on cigarettes  ___ Save money on medical bills  ___ Save on life, health, and car insurance premiums    Those Dollars Add Up!  Cigarettes are expensive, and getting more expensive all the time. Do you realize how much money you are spending on cigarettes per year? What is the average amount you spend on a pack of cigarettes? What is the average number of packs that you smoke per day? Using your answers to these questions, fill in this formula to help you find out:  ($ _____ per pack) ×  ( _____ number of packs per day) × (365 days) =  $ _____ yearly cost of  smoking  Besides tobacco, there are other costs, including extra cleaning bills and replacement costs for clothing and furniture; medical expenses for smoking-related illnesses; and higher health, life, and car insurance premiums.    Cigars and Pipes Count Too!  Cigars and pipes are also dangerous. So are smokeless (chewing) tobacco and snuff. All of these products contain nicotine, a highly addictive substance that has harmful effects on your body. Quitting smoking means giving up all tobacco products.      4333-9532 Elisa [facility], [address] [place] [address], [place] [address]. All rights reserved. This information is not intended as a substitute for professional medical care. Always follow your healthcare professional's instructions.

## 2019-06-12 NOTE — TELEPHONE ENCOUNTER
ASPIRIN EC PO  Last Written Prescription Date:  0  Last Fill Quantity: 0,   # refills: 0  Last Office Visit:    5/29/2019      Atrovastatin is not on active medication list   Patient self reports using Zocor 20 mg daily.      Future Office visit   Next 5 appointments (look out 90 days)    Jul 31, 2019 10:20 AM CDT  (Arrive by 10:05 AM)  PHYSICAL with Amberly Whyte NP  [facility] ([facility] ) [address]  [place] [address]  [phone]   Aug 30, 2019 11:00 AM CDT  (Arrive by 10:45 AM)  SHORT with Amberly Whyte NP  [facility] ([facility] ) [address]  [place] [address]  [phone]           Routing refill request to provider for review/approval because:  Medication is reported/historical

## 2019-06-12 NOTE — PROGRESS NOTES
SUBJECTIVE:  Marly Cannon, 53 year old, female presents with the following Chief Complaint(s) with HPI to follow:  Chief Complaint   Patient presents with     Diabetes        Diabetes Follow-up      Patient is checking blood sugars:0.1x/day.  Results:  Highest: 311  Lowest: 294  Period average: 302    Values above, >180: 2  Values within goal (): 0  Values below goal, <70: 0      Symptoms of hypoglycemia (low blood sugar): none    Paresthesias (numbness or burning in feet) or sores: No    Diabetic eye exam within the last year: Yes    Breakfast eaten regularly: once in awhile     Patient counting carbs: Yes       HPI:  Marly's here today for the follow up regarding her Diabetes mellitus, Type 2.    Lab Results   Component Value Date    A1C 11.0 05/29/2019    A1C 9.8 02/22/2019    A1C 10.9 11/21/2018    A1C 9.6 08/21/2018    A1C 9.6 08/21/2018     Current Diabetes medication:   1.  Tresiba 76 units daily (supper time)--reports no missed doses  2.  Trulicity 0.75 mg weekly--report no missed doses  3.  Novolog per sliding--report no missed doses  10-15 units before meals  ASA use: yes, 81 mg daily  Statin use: yes, simvastatin 20 mg daily    Marly's here today for a meter download and possible insulin ajdust. Reports the following:   Denies any missed doses of insulin.  Denies any missed doses of Trulicity     Denies any new health concerns.     Decision made to start a CGM study    Patient Active Problem List   Diagnosis     Type 2 diabetes, HbA1c goal < 7% (H)     ACP (advance care planning)     Stage 3 chronic kidney disease (H)     Pulmonary emphysema (H)     Hyperparathyroidism due to renal insufficiency (H)     Morbid obesity (H)     Vitamin D deficiency     Intellectual disability     Breast fibrocystic disorder     Tobacco abuse     Microalbuminuria due to type 2 diabetes mellitus (H)     H/O colonoscopy     [place] cardiac risk <10% in next 10 years     Hypomagnesemia     Tubular adenoma of  colon     Hyperlipidemia, unspecified hyperlipidemia type     Essential hypertension     Callus of foot       History reviewed. No pertinent past medical history.    Past Surgical History:   Procedure Laterality Date     COLONOSCOPY N/A 8/20/2015    Procedure: COLONOSCOPY;  Surgeon: Gentry Porter MD;  Location: [facility]     COLONOSCOPY N/A 9/21/2018    Procedure: COLONOSCOPY;  COLONOSCOPY WITH POLYPECTOMY;  Surgeon: Gentry Porter MD;  Location: [facility]       Family History   Problem Relation Age of Onset     Diabetes Mother      Diabetes Father      Hypertension Brother      Diabetes Sister        Social History     Tobacco Use     Smoking status: Current Every Day Smoker     Packs/day: 1.00     Years: 34.00     Pack years: 34.00     Types: Cigarettes     Start date: 1/1/1979     Smokeless tobacco: Never Used     Tobacco comment: Declined QP 04/24/19   Substance Use Topics     Alcohol use: No     Alcohol/week: 0.0 oz       Current Outpatient Medications   Medication Sig Dispense Refill     Acetaminophen (TYLENOL PO) Take 325 mg by mouth every 6 hours as needed        AMLODIPINE BESYLATE PO Take 2.5 mg by mouth daily       ammonium lactate (LAC-HYDRIN) 12 % cream Apply topically 2 times daily       blood glucose monitoring (NO BRAND SPECIFIED) test strip Use to test blood sugar 3 times daily or as directed. 300 strip 3     Cholecalciferol (VITAMIN D-3 PO) Take 2,000 Units by mouth daily       Cyclobenzaprine HCl (FLEXERIL PO) Take 10 mg by mouth 3 times daily as needed for muscle spasms       dulaglutide (TRULICITY) 1.5 MG/0.5ML pen Inject 1.5 mg Subcutaneous every 7 days 2 mL 3     HYDROCHLOROTHIAZIDE PO Take 12.5 mg by mouth daily        hydrocortisone 2.5 % cream Apply topically 2 times daily       insulin aspart (NOVOLOG PEN) 100 UNIT/ML pen Inject 5 units if BG is greater than 200; inject 10 units if BG is greater than 300 before meals.  TDD: 30 units 15 mL 3     insulin degludec (TRESIBA FLEXTOUCH) 200  "UNIT/ML pen Inject 84 Units Subcutaneous daily 18 mL 3     insulin pen needle (32G X 4 MM) 32G X 4 MM miscellaneous Use 1 pen needles daily 100 each 3     Ipratropium-Albuterol (COMBIVENT RESPIMAT)  MCG/ACT inhaler Inhale 1 puff into the lungs 4 times daily       LANTUS SOLOSTAR 100 UNIT/ML soln        lisinopril (PRINIVIL/ZESTRIL) 40 MG tablet 40 mg daily       LISINOPRIL PO Take 40 mg by mouth daily       ONETOUCH DELICA LANCETS 33G MISC 1 each 3 times daily 100 each 10     PRIMIDONE PO Take by mouth At Bedtime       SIMVASTATIN PO Take 20 mg by mouth At Bedtime        triamcinolone (KENALOG) 0.1 % cream Apply topically 2 times daily       aspirin 81 MG EC tablet Take 1 tablet (81 mg) by mouth daily 90 tablet 3     atorvastatin (LIPITOR) 40 MG tablet Take 1 tablet (40 mg) by mouth daily 90 tablet 3       No Known Allergies    REVIEW OF SYSTEMS  Skin: negative  Eyes: negative  Ears/Nose/Throat: negative  Respiratory: No shortness of breath, dyspnea on exertion, cough, or hemoptysis  Cardiovascular: negative  Gastrointestinal: negative  Genitourinary: negative  Musculoskeletal: negative  Neurologic: negative; history of headaches  Psychiatric: negative  Hematologic/Lymphatic/Immunologic: negative  Endocrine: positive for diabetes    OBJECTIVE:  /76 (BP Location: Left arm, Cuff Size: Adult Large)   Pulse 89   Ht 1.626 m (5' 4\")   Wt 100.8 kg (222 lb 3.2 oz)   SpO2 90%   BMI 38.14 kg/m    Constitutional: alert and no distress  Cardiovascular: RRR. Murmur noted.  No clicks gallops or rub  Respiratory:  Good diaphragmatic excursion. Lungs clear  Psychiatric: mentation appears normal for baseline and affect normal/bright    LABS  Results for orders placed or performed in visit on 05/29/19   Hepatitis C antibody   Result Value Ref Range    Hepatitis C Antibody Nonreactive NR^Nonreactive   HIV Antigen Antibody Combo   Result Value Ref Range    HIV Antigen Antibody Combo Nonreactive NR^Nonreactive     "   Hemoglobin A1c   Result Value Ref Range    Hemoglobin A1C 11.0 (H) 0 - 5.6 %   Estimated Average Glucose   Result Value Ref Range    Estimated Average Glucose 269 mg/dL       ASSESSMENT / PLAN:  (E11.65,  Z79.4) Type 2 diabetes mellitus with hyperglycemia, with long-term current use of insulin (H)  (primary encounter diagnosis)  Comment:   Again, BG ave is above target  Denies any missed doses  Things aren't adding up    Plan: GLUCOSE MONITORING CONTINUOUS, >=72 HR          Will start a CGM study    Sensor attached to left lateral upper arm. No redness, drainage or bleeding noted. Cleansed with alcohol wipe and utilized IV prep prior to attachment.  Pt instructed on testing schedule, restrictions during wear, and to remove if needs to have MRI, CT or x-ray. Pt will return on as discussed for detach and evaluation.     Sensor Lot #: [id number]  Exp date: 10/31/19      (Z72.0) Tobacco abuse  Comment: noted and refused   Plan: Tobacco Cessation - Order to Satisfy Health         Maintenance          Marly's aware to let us know when she's ready to quit smoking.  Discussed the dangers of smoking with diabetes      (Z71.6) Tobacco abuse counseling  Comment: noted  Plan: Tobacco Cessation - Order to Satisfy Health         Maintenance        As mentioned above    Patient Instructions   Continue working on healthy eating and moving (start low and slow, work up to 30 min, 5x/week)    BG goals:  Fasting and before meals <130, >70  2 hour after eating <180    We only need 1/2 of these numbers to be within target then your A1c will be within target    Diabetes Medications    1.  Trulicity  Trulicity 1.5 mg every Friday    2.  Novolog before meals  If you don't take your BG or BG >70 before meals: 10 units  If BG is greater than 300: 15 units     3.  Tresiba  76 units daily at supper    If you are sweaty, dizzy, shaky or lightheaded, please check a BG before injecting insulin    If you are interested in the Bud continuous  glucose monitor (never have to poke your finger to check your BG again), start checking your BG 4 times a day    Follow up   One week   2 weeks    Call me sooner if any problems/concerns and/or questions develop including consistent low BGs <70 or consistent high BGs >200  [phone] (Unit Coordinator)    [phone] (Nurse)           HOW TO QUIT SMOKING  Smoking is one of the hardest habits to break. About half of all those who have ever smoked have been able to quit, and most of those (about 70%) who still smoke want to quit. Here are some of the best ways to stop smoking.     KEEP TRYING:  It takes most smokers about 8 tries before they are finally able to fully quit. So, the more often you try and fail, the better your chance of quitting the next time! So, don't give up!    GO COLD TURKEY:  Most ex-smokers quit cold turkey. Trying to cut back gradually doesn't seem to work as well, perhaps because it continues the smoking habit. Also, it is possible to fool yourself by inhaling more while smoking fewer cigarettes. This results in the same amount of nicotine in your body!    GET SUPPORT:  Support programs can make an important difference, especially for the heavy smoker. These groups offer lectures, methods to change your behavior and peer support. Call the free national Quitline for more information. 800-QUIT-NOW (124-517-6067). Low-cost or free programs are offered by many hospitals, local chapters of the American Lung Association (919-361-5267) and the American Cancer Society (362-969-6007). Support at home is important too. Non-smokers can help by offering praise and encouragement. If the smoker fails to quit, encourage them to try again!    OVER-THE-COUNTER MEDICINES:  For those who can't quit on their own, Nicotine Replacement Therapy (NRT) may make quitting much easier. Certain aids such as the nicotine patch, gum and lozenge are available without a prescription. However, it is best to use these under  the guidance of your doctor. The skin patch provides a steady supply of nicotine to the body. Nicotine gum and lozenge gives temporary bursts of low levels of nicotine. Both methods take the edge off the craving for cigarettes. WARNING: If you feel symptoms of nicotine overdose, such as nausea, vomiting, dizziness, weakness, or fast heartbeat, stop using these and see your doctor.    PRESCRIPTION MEDICINES:  After evaluating your smoking patterns and prior attempts at quitting, your doctor may offer a prescription medicine such as bupropion (Zyban, Wellbutrin), varenicline (Chantix, Champix), a niocotine inhaler or nasal spray. Each has its unique advantage and side effects which your doctor can review with you.    HEALTH BENEFITS OF QUITTING:  The benefits of quitting start right away and keep improving the longer you go without smokin minutes: blood pressure and pulse return to normal  8 hours: oxygen levels return to normal  2 days: ability to smell and taste begins to improve as damaged nerves start to regrow  2-3 weeks: circulation and lung function improves  1-9 months: decreased cough, congestion and shortness of breath; less tired  1 year: risk of heart attack decreases by half  5 years: risk of lung cancer decreases by half; risk of stroke becomes the same as a non-smoker  For information about how to quit smoking, visit the following links:  National Cancer [place] ,   Clearing the Air, Quit Smoking Today   - an online booklet. http://www.smokefree.gov/pubs/clearing_the_air.pdf  Smokefree.gov http://smokefree.gov/  QuitNet http://www.quitnet.com/    8877-3008 Elisa Munson, [address] [place] [address], [place] [address]. All rights reserved. This information is not intended as a substitute for professional medical care. Always follow your healthcare professional's instructions.    The Benefits of Living Smoke Free  What do you want to gain from quitting? Check off some reasons to quit.  Health  Benefits  ___ Reduce my risk of lung cancer, heart disease, chronic lung disease  ___ Have fewer wrinkles and softer skin  ___ Improve my sense of taste and smell  ___ For pregnant women--reduce the risk of having a miscarriage, stillbirth, premature birth, or low-birth-weight baby  Personal Benefits  ___ Feel more in control of my life  ___ Have better-smelling hair, breath, clothes, home, and car  ___ Save time by not having to take smoke breaks, buy cigarettes, or hunt for a light  ___ Have whiter teeth  Family Benefits  ___ Reduce my children s respiratory tract infections  ___ Set a good example for my children  ___ Reduce my family s cancer risk  Financial Benefits  ___ Save hundreds of dollars each year that would be spent on cigarettes  ___ Save money on medical bills  ___ Save on life, health, and car insurance premiums    Those Dollars Add Up!  Cigarettes are expensive, and getting more expensive all the time. Do you realize how much money you are spending on cigarettes per year? What is the average amount you spend on a pack of cigarettes? What is the average number of packs that you smoke per day? Using your answers to these questions, fill in this formula to help you find out:  ($ _____ per pack) ×  ( _____ number of packs per day) × (365 days) =  $ _____ yearly cost of smoking  Besides tobacco, there are other costs, including extra cleaning bills and replacement costs for clothing and furniture; medical expenses for smoking-related illnesses; and higher health, life, and car insurance premiums.    Cigars and Pipes Count Too!  Cigars and pipes are also dangerous. So are smokeless (chewing) tobacco and snuff. All of these products contain nicotine, a highly addictive substance that has harmful effects on your body. Quitting smoking means giving up all tobacco products.      5427-9036 Elisa Munson, [address] [facility], [place] [address]. All rights reserved. This information is not intended as a  substitute for professional medical care. Always follow your healthcare professional's instructions.      Time: 40 minutes  Barrier: see [facility]  Willingness to learn: accepting    Courtney PINTO [facility]  Disease Management    Cc: Amberly Whyte NP      With the electronic record, we can now more quickly and easily track our patient diabetic goals. Our diabetes clinical review is in progress and these are the indicators we are monitoring for good diabetes health.     1.) HbA1C less than 7 (measurement of your average blood sugars)  2.) Blood Pressure less than 140/80  3.) LDL less than 100 (bad cholesterol)  4.) HbA1C is checked in the last 6 months and below 7% (more frequently if not at goal or adjusting medications)  5.) LDL is checked in the last 12 months (more frequently if not at goal or adjusting medications)  6.) Taking one baby aspirin daily (unless otherwise instructed)  7.) No tobacco use  8) Statin use     You have achieved 6 out of 8 of these and I am encouraging you to come in and get tuned up to achieve 8 out of 8!  Here is what you have achieved so far in my goals for you:  1.) HbA1C  less than 7:                              NO  Your last  HbA1C :  Lab Results   Component Value Date    A1C 9.8 02/22/2019    A1C 10.9 11/21/2018    A1C 9.6 08/21/2018    A1C 9.6 08/21/2018    A1C 7.7 05/22/2018     2.) Blood Pressure less than 140/80:       YES      Your last    BP Readings from Last 1 Encounters:   06/12/19 113/76     3.) LDL less than 100:                              YES      Your last     LDL Cholesterol Calculated   Date Value Ref Range Status   02/22/2019 86 100 mg/dL Final       4.) Checked HbA1C in the past 6 months: YES      5.) Checked LDL in the past 12 months:    YES      6.) Taking one aspirin daily:                       YES     7.) No tobacco use:                                        NO   8.) Statin use      YES

## 2019-06-15 ENCOUNTER — MEDICAL CORRESPONDENCE (OUTPATIENT)
Dept: HEALTH INFORMATION MANAGEMENT | Facility: CLINIC | Age: 54
End: 2019-06-15

## 2019-06-20 ENCOUNTER — ALLIED HEALTH/NURSE VISIT (OUTPATIENT)
Dept: EDUCATION SERVICES | Facility: OTHER | Age: 54
End: 2019-06-20
Attending: NURSE PRACTITIONER
Payer: MEDICARE

## 2019-06-20 VITALS
HEART RATE: 93 BPM | HEIGHT: 64 IN | OXYGEN SATURATION: 92 % | BODY MASS INDEX: 37.49 KG/M2 | SYSTOLIC BLOOD PRESSURE: 113 MMHG | DIASTOLIC BLOOD PRESSURE: 81 MMHG | WEIGHT: 219.6 LBS | RESPIRATION RATE: 18 BRPM

## 2019-06-20 DIAGNOSIS — Z72.0 TOBACCO ABUSE: ICD-10-CM

## 2019-06-20 DIAGNOSIS — Z79.4 TYPE 2 DIABETES MELLITUS WITH HYPERGLYCEMIA, WITH LONG-TERM CURRENT USE OF INSULIN (H): Primary | ICD-10-CM

## 2019-06-20 DIAGNOSIS — Z71.6 TOBACCO ABUSE COUNSELING: ICD-10-CM

## 2019-06-20 DIAGNOSIS — E11.65 TYPE 2 DIABETES MELLITUS WITH HYPERGLYCEMIA, WITH LONG-TERM CURRENT USE OF INSULIN (H): Primary | ICD-10-CM

## 2019-06-20 PROCEDURE — G0463 HOSPITAL OUTPT CLINIC VISIT: HCPCS

## 2019-06-20 PROCEDURE — 95251 CONT GLUC MNTR ANALYSIS I&R: CPT | Performed by: NURSE PRACTITIONER

## 2019-06-20 PROCEDURE — 99213 OFFICE O/P EST LOW 20 MIN: CPT | Mod: 25 | Performed by: NURSE PRACTITIONER

## 2019-06-20 ASSESSMENT — PAIN SCALES - GENERAL: PAINLEVEL: NO PAIN (0)

## 2019-06-20 ASSESSMENT — MIFFLIN-ST. JEOR: SCORE: 1576.1

## 2019-06-20 NOTE — PROGRESS NOTES
"Chief Complaint   Patient presents with     Diabetes       Initial /81   Pulse 93   Resp (!) 0   Ht 1.626 m (5' 4\")   Wt 99.6 kg (219 lb 9.6 oz)   SpO2 92%   BMI 37.69 kg/m   Estimated body mass index is 37.69 kg/m  as calculated from the following:    Height as of this encounter: 1.626 m (5' 4\").    Weight as of this encounter: 99.6 kg (219 lb 9.6 oz).  Medication Reconciliation: complete   Gege Walter          "

## 2019-06-20 NOTE — PROGRESS NOTES
SUBJECTIVE:  Marly Cannon, 53 year old, female presents with the following Chief Complaint(s) with HPI to follow:  Chief Complaint   Patient presents with     Diabetes     CGM 1st week evaluation        Diabetes Follow-up      Patient is checking blood sugars: CGM.  Results:  See below      Symptoms of hypoglycemia (low blood sugar): none    Paresthesias (numbness or burning in feet) or sores: No    Diabetic eye exam within the last year: Yes    Breakfast eaten regularly: once in awhile     Patient counting carbs: Yes       HPI:  Marly's here today for the follow up regarding her Diabetes mellitus, Type 2.    Lab Results   Component Value Date    A1C 11.0 05/29/2019    A1C 9.8 02/22/2019    A1C 10.9 11/21/2018    A1C 9.6 08/21/2018    A1C 9.6 08/21/2018     Current Diabetes medication:   1.  Tresiba 76 units daily (supper time)--reports no missed doses  2.  Trulicity 1.5 mg weekly--reports no missed doses  3.  Novolog per sliding, 10 units; 15 units >300--reports no missed doses    ASA use: yes, 81 mg daily  Statin use: yes, simvastatin 20 mg daily    Marly's here today for her 1st CGM study evaluation and possible insulin ajdust.  Denies any missed doses of insulin.  Denies any missed doses of Trulicity     Denies any new health concerns.        06/20/19 1049   General   Presents for Follow-up   Accompanied by Self   Diabetes education in the past 24mo Yes   Focus of Visit Monitoring;Taking Medication;CGM  (follow up from Trulicity start)   Insulin Type NovoLog;Tresiba   Diabetes type Type 2   Disease course Getting harder to manage  (No change)   How confident are you filling out medical forms by yourself: Not at all   Transportation concerns No   Other concerns: Glasses;Cognitive impairment   Symptoms   Blurred vision No   Fatigue No   Neuropathy No   Foot ulcerations No   Polydipsia No   Polyphagia No   Polyuria No   Visual change No   Weakness No   Weight loss No   Slow healing wounds No   Recent  Infection(s) No   Symptom course Stable   Weight trend Fluctuating minimally   Diabetic Complications   Autonomic neuropathy No   CVA No   Heart disease No   Nephropathy Yes   Peripheral neuropathy No   Peripheral Vascular Disease No   Retinopathy No   Sexual dysfunction No        06/20/19 1049   Healthy Eating   Healthy Eating Assessed Today Yes   Cultural/[employer] diet restrictions? No   Patient on a regular basis Has an inconsistent intake of carbohydrates   Meal planning None   Meals include Breakfast;Dinner   Beverages Water;Coffee;Diet soda   Has patient met with a dietitian in the past? Yes   Being Active   Being Active Assessed Today Yes   Exercise: Yes   Days per week of moderate to strenuous exercise (like a brisk walk) 4   On average, minutes per day of exercise at this level 30   How intense was your typical exercise?  Light (like stretching or slow walking)   Exercise Minutes per Week 120   Monitoring   Monitoring Assessed Today Yes   Did patient bring glucose meter to appointment?  Yes   Blood Glucose Meter One Touch   Home Glucose (Sugar) Monitoring 3-4 times per day  (occasionally tests before supper for NovoLOG dose)   Blood glucose trend Fluctuating dramtically   Low Glucose Range (mg/dL) >200   High Glucose Range (mg/dL) >200   Overall Range (mg/dL) >200   Taking Medication   Taking Medication Assessed Today Yes   Current Treatments Insulin Injections;Non-insulin Injectables   Dose schedule pre-dinner;pre-breakfast   Given by Patient   Injection/Infusion sites Abdomen   Problems taking diabetes medications regularly? Yes  (concerns about taking insulin correctly)   Diabetes medication side effects? No   Treatment Compliance All of the time  (as reported)   Problem Solving   Problem Solving Assessed Today Yes   Hypoglycemia Frequency Never   Patient carries a carbohydrate source Yes   Hypoglycemia symptoms   Confusion No   Dizziness or Light-Headedness No   Headaches No   Hunger No   Mood changes  No   Nervousness/Anxiety No   Sleepiness No   Speech difficulty No   Sweats No   Tremors No   Hypoglycemia Complications   Seizures No   Blackouts No   Hospitalization No   Nocturnal hypoglycemia No   Required assistance No   Required glucagon injection No   Reducing Risks   Diabetes Risks Age over 45 years;Hyperlipidemia   CAD Risks Diabetes Mellitus;Dyslipidemia;Hypertension;Tobacco exposure;Obesity   Has dilated eye exam at least once a year? Yes   Healthy Coping   Healthy Coping Assessed Today Yes   Emotional response to diabetes Acceptance   Informal Support system: Significant other   Stage of change MAINTENANCE (Working to maintain change, with risk of relapse)   Difficulty affording diabetes management supplies? Yes   Support resources In-person Offerings     Patient Active Problem List   Diagnosis     Type 2 diabetes, HbA1c goal < 7% (H)     ACP (advance care planning)     Stage 3 chronic kidney disease (H)     Pulmonary emphysema (H)     Hyperparathyroidism due to renal insufficiency (H)     Morbid obesity (H)     Vitamin D deficiency     Intellectual disability     Breast fibrocystic disorder     Tobacco abuse     Microalbuminuria due to type 2 diabetes mellitus (H)     H/O colonoscopy     Bedias cardiac risk <10% in next 10 years     Hypomagnesemia     Tubular adenoma of colon     Hyperlipidemia, unspecified hyperlipidemia type     Essential hypertension     Callus of foot       No past medical history on file.    Past Surgical History:   Procedure Laterality Date     COLONOSCOPY N/A 8/20/2015    Procedure: COLONOSCOPY;  Surgeon: Gentry Porter MD;  Location: [facility]     COLONOSCOPY N/A 9/21/2018    Procedure: COLONOSCOPY;  COLONOSCOPY WITH POLYPECTOMY;  Surgeon: Gentry Porter MD;  Location: [facility]       Family History   Problem Relation Age of Onset     Diabetes Mother      Diabetes Father      Hypertension Brother      Diabetes Sister        Social History     Tobacco Use     Smoking  status: Current Every Day Smoker     Packs/day: 1.00     Years: 34.00     Pack years: 34.00     Types: Cigarettes     Start date: 1/1/1979     Smokeless tobacco: Never Used     Tobacco comment: Declined QP 04/24/19   Substance Use Topics     Alcohol use: No     Alcohol/week: 0.0 oz       Current Outpatient Medications   Medication Sig Dispense Refill     blood glucose (NO BRAND SPECIFIED) test strip Use to test blood sugar 4 times daily or as directed. 300 strip 11     Acetaminophen (TYLENOL PO) Take 325 mg by mouth every 6 hours as needed        AMLODIPINE BESYLATE PO Take 2.5 mg by mouth daily       ammonium lactate (LAC-HYDRIN) 12 % cream Apply topically 2 times daily       aspirin 81 MG EC tablet Take 1 tablet (81 mg) by mouth daily 90 tablet 3     atorvastatin (LIPITOR) 40 MG tablet Take 1 tablet (40 mg) by mouth daily 90 tablet 3     Cholecalciferol (VITAMIN D-3 PO) Take 2,000 Units by mouth daily       Cyclobenzaprine HCl (FLEXERIL PO) Take 10 mg by mouth 3 times daily as needed for muscle spasms       dulaglutide (TRULICITY) 1.5 MG/0.5ML pen Inject 1.5 mg Subcutaneous every 7 days 2 mL 3     HYDROCHLOROTHIAZIDE PO Take 12.5 mg by mouth daily        hydrocortisone 2.5 % cream Apply topically 2 times daily       insulin aspart (NOVOLOG PEN) 100 UNIT/ML pen Inject 5 units if BG is greater than 200; inject 10 units if BG is greater than 300 before meals.  TDD: 30 units 15 mL 3     insulin degludec (TRESIBA FLEXTOUCH) 200 UNIT/ML pen Inject 84 Units Subcutaneous daily 18 mL 3     insulin pen needle (32G X 4 MM) 32G X 4 MM miscellaneous Use 1 pen needles daily 100 each 3     Ipratropium-Albuterol (COMBIVENT RESPIMAT)  MCG/ACT inhaler Inhale 1 puff into the lungs 4 times daily       LANTUS SOLOSTAR 100 UNIT/ML soln        lisinopril (PRINIVIL/ZESTRIL) 40 MG tablet 40 mg daily       LISINOPRIL PO Take 40 mg by mouth daily       ONETOUCH DELICA LANCETS 33G MISC 1 each 3 times daily 100 each 10     PRIMIDONE PO  "Take by mouth At Bedtime       SIMVASTATIN PO Take 20 mg by mouth At Bedtime        triamcinolone (KENALOG) 0.1 % cream Apply topically 2 times daily         No Known Allergies    REVIEW OF SYSTEMS  Skin: negative  Eyes: negative  Ears/Nose/Throat: negative  Respiratory: No shortness of breath, cough, or hemoptysis; sometimes SOB with walking long distance--using inhaler  Cardiovascular: negative  Gastrointestinal: negative  Genitourinary: negative  Musculoskeletal: negative  Neurologic: negative; history of headaches  Psychiatric: negative  Hematologic/Lymphatic/Immunologic: negative  Endocrine: positive for diabetes; sweaty spells     OBJECTIVE:  /81   Pulse 93   Resp 18   Ht 1.626 m (5' 4\")   Wt 99.6 kg (219 lb 9.6 oz)   SpO2 92%   BMI 37.69 kg/m    Constitutional: alert and no distress  Cardiovascular: RRR. Murmur noted.  No clicks gallops or rub  Respiratory:  Good diaphragmatic excursion. Lungs clear  Psychiatric: mentation appears normal for baseline and affect normal/bright    LABS  Results for orders placed or performed in visit on 19   Hepatitis C antibody   Result Value Ref Range    Hepatitis C Antibody Nonreactive NR^Nonreactive   HIV Antigen Antibody Combo   Result Value Ref Range    HIV Antigen Antibody Combo Nonreactive NR^Nonreactive       Hemoglobin A1c   Result Value Ref Range    Hemoglobin A1C 11.0 (H) 0 - 5.6 %   Estimated Average Glucose   Result Value Ref Range    Estimated Average Glucose 269 mg/dL       ASSESSMENT / PLAN:  (E11.65,  Z79.4) Type 2 diabetes mellitus with hyperglycemia, with long-term current use of insulin (H)  (primary encounter diagnosis)  Comment:   Findings:  Ave. S    Date:     Ave. Glucose: 367   Time in target: 0%  Time below target: 0%  Time above target: 100%    Date:     Ave. Glucose: 343   Time in target: 0%  Time below target: 0%  Time above target: 100%    Date:     Ave. Glucose: 409   Time in target: 0%  Time below target: " 0%  Time above target: 100%    Date: 6/15    Ave. Glucose: 316   Time in target: 0%  Time below target: 0%  Time above target: 100%    Date: 6/16    Ave. Glucose: 341   Time in target: 0%  Time below target: 0%  Time above target: 100%    Date: 6/17    Ave. Glucose: 296   Time in target: 0%  Time below target: 0%  Time above target: 100%    Date: 6/18    Ave. Glucose: 268   Time in target: 2%  Time below target: 0%  Time above target: 98%    Date: 6/19    Ave. Glucose: 296   Time in target: 0%  Time below target: 0%  Time above target: 100%    Date: 6/20    Ave. Glucose: 230   Time in target: 0%  Time below target: 0%  Time above target: 100%      Distribution Data:  Percentage above 180: 100%  Percentage within : 0%  Percentage below 70: 0%    Hypoglycemia incidence:  none    Food choices/Carbohydrate counting:  Didn't keep food logs    Exercise:  walks    Recommendations:  Increase basal insulin    Plan: blood glucose (NO BRAND SPECIFIED) test strip,         GLUCOSE MONITOR, 72 HOUR, PHYS INTERP          Will keep increasing her basal in sulin    (Z72.0) Tobacco abuse  Comment: noted and refused  Plan: Tobacco Cessation - Order to Satisfy Health         Maintenance                  Marly's aware to let us know when she's ready to quit smoking.  Discussed the dangers of smoking with diabetes    (Z71.6) Tobacco abuse counseling  Comment: noted  Plan: Tobacco Cessation - Order to Satisfy Health         Maintenance        As mentioned above      Patient Instructions   Continue working on healthy eating and moving (start low and slow, work up to 30 min, 5x/week)    BG goals:  Fasting and before meals <130, >70  2 hour after eating <180    We only need 1/2 of these numbers to be within target then your A1c will be within target    Diabetes Medications    1.  Trulicity  Trulicity 1.5 mg every Friday    2.  Novolog before meals  If you don't take your BG or BG >70 before meals: 10 units  If BG is greater than 300:  15 units     3.  Tresiba  80 units daily at supper    If you are sweaty, dizzy, shaky or lightheaded, please check a BG before injecting insulin    If you are interested in the Bud continuous glucose monitor (never have to poke your finger to check your BG again), start checking your BG 4 times a day    Follow up   One week       Call me sooner if any problems/concerns and/or questions develop including consistent low BGs <70 or consistent high BGs >200  [phone] (Unit Coordinator)    [phone] (Nurse)           HOW TO QUIT SMOKING  Smoking is one of the hardest habits to break. About half of all those who have ever smoked have been able to quit, and most of those (about 70%) who still smoke want to quit. Here are some of the best ways to stop smoking.     KEEP TRYING:  It takes most smokers about 8 tries before they are finally able to fully quit. So, the more often you try and fail, the better your chance of quitting the next time! So, don't give up!    GO COLD TURKEY:  Most ex-smokers quit cold turkey. Trying to cut back gradually doesn't seem to work as well, perhaps because it continues the smoking habit. Also, it is possible to fool yourself by inhaling more while smoking fewer cigarettes. This results in the same amount of nicotine in your body!    GET SUPPORT:  Support programs can make an important difference, especially for the heavy smoker. These groups offer lectures, methods to change your behavior and peer support. Call the free national Quitline for more information. 800-QUIT-NOW (544-762-9146). Low-cost or free programs are offered by many hospitals, local chapters of the American Lung Association (320-294-8465) and the American Cancer Society (014-493-1585). Support at home is important too. Non-smokers can help by offering praise and encouragement. If the smoker fails to quit, encourage them to try again!    OVER-THE-COUNTER MEDICINES:  For those who can't quit on their own, Nicotine  Replacement Therapy (NRT) may make quitting much easier. Certain aids such as the nicotine patch, gum and lozenge are available without a prescription. However, it is best to use these under the guidance of your doctor. The skin patch provides a steady supply of nicotine to the body. Nicotine gum and lozenge gives temporary bursts of low levels of nicotine. Both methods take the edge off the craving for cigarettes. WARNING: If you feel symptoms of nicotine overdose, such as nausea, vomiting, dizziness, weakness, or fast heartbeat, stop using these and see your doctor.    PRESCRIPTION MEDICINES:  After evaluating your smoking patterns and prior attempts at quitting, your doctor may offer a prescription medicine such as bupropion (Zyban, Wellbutrin), varenicline (Chantix, Champix), a niocotine inhaler or nasal spray. Each has its unique advantage and side effects which your doctor can review with you.    HEALTH BENEFITS OF QUITTING:  The benefits of quitting start right away and keep improving the longer you go without smokin minutes: blood pressure and pulse return to normal  8 hours: oxygen levels return to normal  2 days: ability to smell and taste begins to improve as damaged nerves start to regrow  2-3 weeks: circulation and lung function improves  1-9 months: decreased cough, congestion and shortness of breath; less tired  1 year: risk of heart attack decreases by half  5 years: risk of lung cancer decreases by half; risk of stroke becomes the same as a non-smoker  For information about how to quit smoking, visit the following links:  [employer] [place] ,   Clearing the Air, Quit Smoking Today   - an online booklet. http://www.smokefree.gov/pubs/clearing_the_air.pdf  Smokefree.gov http://smokefree.gov/  QuitNet http://www.quitnet.com/    9261-5285 Elisa Munson, [address] [place] [address], [place] [address]. All rights reserved. This information is not intended as a substitute for professional medical  care. Always follow your healthcare professional's instructions.    The Benefits of Living Smoke Free  What do you want to gain from quitting? Check off some reasons to quit.  Health Benefits  ___ Reduce my risk of lung cancer, heart disease, chronic lung disease  ___ Have fewer wrinkles and softer skin  ___ Improve my sense of taste and smell  ___ For pregnant women--reduce the risk of having a miscarriage, stillbirth, premature birth, or low-birth-weight baby  Personal Benefits  ___ Feel more in control of my life  ___ Have better-smelling hair, breath, clothes, home, and car  ___ Save time by not having to take smoke breaks, buy cigarettes, or hunt for a light  ___ Have whiter teeth  Family Benefits  ___ Reduce my children s respiratory tract infections  ___ Set a good example for my children  ___ Reduce my family s cancer risk  Financial Benefits  ___ Save hundreds of dollars each year that would be spent on cigarettes  ___ Save money on medical bills  ___ Save on life, health, and car insurance premiums    Those Dollars Add Up!  Cigarettes are expensive, and getting more expensive all the time. Do you realize how much money you are spending on cigarettes per year? What is the average amount you spend on a pack of cigarettes? What is the average number of packs that you smoke per day? Using your answers to these questions, fill in this formula to help you find out:  ($ _____ per pack) ×  ( _____ number of packs per day) × (365 days) =  $ _____ yearly cost of smoking  Besides tobacco, there are other costs, including extra cleaning bills and replacement costs for clothing and furniture; medical expenses for smoking-related illnesses; and higher health, life, and car insurance premiums.    Cigars and Pipes Count Too!  Cigars and pipes are also dangerous. So are smokeless (chewing) tobacco and snuff. All of these products contain nicotine, a highly addictive substance that has harmful effects on your body. Quitting  smoking means giving up all tobacco products.      8537-1092 [employer] [facility], [address] [place] [address], [place] [address]. All rights reserved. This information is not intended as a substitute for professional medical care. Always follow your healthcare professional's instructions.      Time: 30 minutes  Barrier: see [facility]  Willingness to learn: accepting    Courtney PINTO [facility]  Disease Management    Cc: Amberly Whyte NP      With the electronic record, we can now more quickly and easily track our patient diabetic goals. Our diabetes clinical review is in progress and these are the indicators we are monitoring for good diabetes health.     1.) HbA1C less than 7 (measurement of your average blood sugars)  2.) Blood Pressure less than 140/80  3.) LDL less than 100 (bad cholesterol)  4.) HbA1C is checked in the last 6 months and below 7% (more frequently if not at goal or adjusting medications)  5.) LDL is checked in the last 12 months (more frequently if not at goal or adjusting medications)  6.) Taking one baby aspirin daily (unless otherwise instructed)  7.) No tobacco use  8) Statin use     You have achieved 6 out of 8 of these and I am encouraging you to come in and get tuned up to achieve 8 out of 8!  Here is what you have achieved so far in my goals for you:  1.) HbA1C  less than 7:                              NO  Your last  HbA1C :  Lab Results   Component Value Date    A1C 11.0 05/29/2019    A1C 9.8 02/22/2019    A1C 10.9 11/21/2018    A1C 9.6 08/21/2018    A1C 9.6 08/21/2018     2.) Blood Pressure less than 140/80:       YES      Your last    BP Readings from Last 1 Encounters:   06/20/19 113/81     3.) LDL less than 100:                              YES      Your last     LDL Cholesterol Calculated   Date Value Ref Range Status   02/22/2019 86 100 mg/dL Final       4.) Checked HbA1C in the past 6 months: YES      5.) Checked LDL in the past 12 months:    YES      6.) Taking one aspirin daily:                        YES     7.) No tobacco use:                                        NO   8.) Statin use      YES

## 2019-06-20 NOTE — PATIENT INSTRUCTIONS
Continue working on healthy eating and moving (start low and slow, work up to 30 min, 5x/week)    BG goals:  Fasting and before meals <130, >70  2 hour after eating <180    We only need 1/2 of these numbers to be within target then your A1c will be within target    Diabetes Medications    1.  Trulicity  Trulicity 1.5 mg every Friday    2.  Novolog before meals  If you don't take your BG or BG >70 before meals: 10 units  If BG is greater than 300: 15 units     3.  Tresiba  80 units daily at supper    If you are sweaty, dizzy, shaky or lightheaded, please check a BG before injecting insulin    If you are interested in the Bud continuous glucose monitor (never have to poke your finger to check your BG again), start checking your BG 4 times a day    Follow up   One week       Call me sooner if any problems/concerns and/or questions develop including consistent low BGs <70 or consistent high BGs >200  [phone] (Unit Coordinator)    [phone] (Nurse)           HOW TO QUIT SMOKING  Smoking is one of the hardest habits to break. About half of all those who have ever smoked have been able to quit, and most of those (about 70%) who still smoke want to quit. Here are some of the best ways to stop smoking.     KEEP TRYING:  It takes most smokers about 8 tries before they are finally able to fully quit. So, the more often you try and fail, the better your chance of quitting the next time! So, don't give up!    GO COLD TURKEY:  Most ex-smokers quit cold turkey. Trying to cut back gradually doesn't seem to work as well, perhaps because it continues the smoking habit. Also, it is possible to fool yourself by inhaling more while smoking fewer cigarettes. This results in the same amount of nicotine in your body!    GET SUPPORT:  Support programs can make an important difference, especially for the heavy smoker. These groups offer lectures, methods to change your behavior and peer support. Call the free national Quitline for  more information. 281-IDTZ-NXR (481-779-9204). Low-cost or free programs are offered by many hospitals, local chapters of the American Lung Association (721-386-3760) and the American Cancer Society (990-778-4979). Support at home is important too. Non-smokers can help by offering praise and encouragement. If the smoker fails to quit, encourage them to try again!    OVER-THE-COUNTER MEDICINES:  For those who can't quit on their own, Nicotine Replacement Therapy (NRT) may make quitting much easier. Certain aids such as the nicotine patch, gum and lozenge are available without a prescription. However, it is best to use these under the guidance of your doctor. The skin patch provides a steady supply of nicotine to the body. Nicotine gum and lozenge gives temporary bursts of low levels of nicotine. Both methods take the edge off the craving for cigarettes. WARNING: If you feel symptoms of nicotine overdose, such as nausea, vomiting, dizziness, weakness, or fast heartbeat, stop using these and see your doctor.    PRESCRIPTION MEDICINES:  After evaluating your smoking patterns and prior attempts at quitting, your doctor may offer a prescription medicine such as bupropion (Zyban, Wellbutrin), varenicline (Chantix, Champix), a niocotine inhaler or nasal spray. Each has its unique advantage and side effects which your doctor can review with you.    HEALTH BENEFITS OF QUITTING:  The benefits of quitting start right away and keep improving the longer you go without smokin minutes: blood pressure and pulse return to normal  8 hours: oxygen levels return to normal  2 days: ability to smell and taste begins to improve as damaged nerves start to regrow  2-3 weeks: circulation and lung function improves  1-9 months: decreased cough, congestion and shortness of breath; less tired  1 year: risk of heart attack decreases by half  5 years: risk of lung cancer decreases by half; risk of stroke becomes the same as a non-smoker  For  information about how to quit smoking, visit the following links:  [employer] ,   Clearing the Air, Quit Smoking Today   - an online booklet. http://www.smokefree.gov/pubs/clearing_the_air.pdf  Smokefree.gov http://smokefree.gov/  QuitNet http://www.quitnet.com/    [id number] Elisa Munson, [address] [place] [address], [place] [address]. All rights reserved. This information is not intended as a substitute for professional medical care. Always follow your healthcare professional's instructions.    The Benefits of Living Smoke Free  What do you want to gain from quitting? Check off some reasons to quit.  Health Benefits  ___ Reduce my risk of lung cancer, heart disease, chronic lung disease  ___ Have fewer wrinkles and softer skin  ___ Improve my sense of taste and smell  ___ For pregnant women--reduce the risk of having a miscarriage, stillbirth, premature birth, or low-birth-weight baby  Personal Benefits  ___ Feel more in control of my life  ___ Have better-smelling hair, breath, clothes, home, and car  ___ Save time by not having to take smoke breaks, buy cigarettes, or hunt for a light  ___ Have whiter teeth  Family Benefits  ___ Reduce my children s respiratory tract infections  ___ Set a good example for my children  ___ Reduce my family s cancer risk  Financial Benefits  ___ Save hundreds of dollars each year that would be spent on cigarettes  ___ Save money on medical bills  ___ Save on life, health, and car insurance premiums    Those Dollars Add Up!  Cigarettes are expensive, and getting more expensive all the time. Do you realize how much money you are spending on cigarettes per year? What is the average amount you spend on a pack of cigarettes? What is the average number of packs that you smoke per day? Using your answers to these questions, fill in this formula to help you find out:  ($ _____ per pack) ×  ( _____ number of packs per day) × (365 days) =  $ _____ yearly cost of smoking  Besides  tobacco, there are other costs, including extra cleaning bills and replacement costs for clothing and furniture; medical expenses for smoking-related illnesses; and higher health, life, and car insurance premiums.    Cigars and Pipes Count Too!  Cigars and pipes are also dangerous. So are smokeless (chewing) tobacco and snuff. All of these products contain nicotine, a highly addictive substance that has harmful effects on your body. Quitting smoking means giving up all tobacco products.      [id number] Elisa [facility], [address] [place] [address], [place] [id number]. All rights reserved. This information is not intended as a substitute for professional medical care. Always follow your healthcare professional's instructions.

## 2019-06-26 NOTE — PROGRESS NOTES
Subjective     Marly Cannon is a 53 year old female who presents to clinic today for the following health issues:    HPI   Sweating spells       Duration: 1 week     Description (location/character/radiation): breaks out into a sweat patient, states it last's most of the day, worsens when she walks, improves when she eats something     Intensity:  moderate    Accompanying signs and symptoms: none, no nausea or vomiting, no abdominal pain, no chest pain or shortness of breath, no fevers, no unintentional weight loss, no dysuria, no hematuria.     History (similar episodes/previous evaluation): None    Precipitating or alleviating factors: None    Therapies tried and outcome:  Has been drinking a lot of water     She does have type 2 DM and [facility]. She was noncompliant with her insulin, however, home care recently got involved to help with medication compliance. Prior to this, her insulin had continually been increased as her A1C kept increasing. When home care got involved, she was found to not be taking her insulins. For the past week, she has been taking her insulin and now she is having the cold sweats. She tells me she has taken her blood sugar and the reading was 168. She has not taken her glucose, however, when she was having a sweat.     Sweats usually occur when she goes for a walk.     Improve when she eats something.      Patient also has a small growth in her right ear. Unsure how long it has been present. Does not hurt or itch. She has been putting peroxide on it and it has not been helping. She would like it removed.       Patient Active Problem List   Diagnosis     Type 2 diabetes, HbA1c goal < 7% (H)     ACP (advance care planning)     Stage 3 chronic kidney disease (H)     Pulmonary emphysema (H)     Hyperparathyroidism due to renal insufficiency (H)     Morbid obesity (H)     Vitamin D deficiency     Intellectual disability     Breast fibrocystic disorder     Tobacco  abuse     Microalbuminuria due to type 2 diabetes mellitus (H)     H/O colonoscopy     Jackson cardiac risk <10% in next 10 years     Hypomagnesemia     Tubular adenoma of colon     Hyperlipidemia, unspecified hyperlipidemia type     Essential hypertension     Callus of foot     Past Surgical History:   Procedure Laterality Date     COLONOSCOPY N/A 8/20/2015    Procedure: COLONOSCOPY;  Surgeon: Gentry Porter MD;  Location: [facility]     COLONOSCOPY N/A 9/21/2018    Procedure: COLONOSCOPY;  COLONOSCOPY WITH POLYPECTOMY;  Surgeon: Gentry Porter MD;  Location: [facility]       Social History     Tobacco Use     Smoking status: Current Every Day Smoker     Packs/day: 1.00     Years: 34.00     Pack years: 34.00     Types: Cigarettes     Start date: 1/1/1979     Smokeless tobacco: Never Used     Tobacco comment: Declined QP 04/24/19   Substance Use Topics     Alcohol use: No     Alcohol/week: 0.0 oz     Family History   Problem Relation Age of Onset     Diabetes Mother      Diabetes Father      Hypertension Brother      Diabetes Sister          Current Outpatient Medications   Medication Sig Dispense Refill     Acetaminophen (TYLENOL PO) Take 325 mg by mouth every 6 hours as needed        AMLODIPINE BESYLATE PO Take 2.5 mg by mouth daily       ammonium lactate (LAC-HYDRIN) 12 % cream Apply topically 2 times daily       aspirin 81 MG EC tablet Take 1 tablet (81 mg) by mouth daily 90 tablet 3     atorvastatin (LIPITOR) 40 MG tablet Take 1 tablet (40 mg) by mouth daily 90 tablet 3     blood glucose (NO BRAND SPECIFIED) test strip Use to test blood sugar 4 times daily or as directed. 300 strip 11     Cholecalciferol (VITAMIN D-3 PO) Take 2,000 Units by mouth daily       Cyclobenzaprine HCl (FLEXERIL PO) Take 10 mg by mouth 3 times daily as needed for muscle spasms       dulaglutide (TRULICITY) 1.5 MG/0.5ML pen Inject 1.5 mg Subcutaneous every 7 days 2 mL 3     HYDROCHLOROTHIAZIDE PO Take 12.5 mg by mouth daily         "hydrocortisone 2.5 % cream Apply topically 2 times daily       insulin aspart (NOVOLOG PEN) 100 UNIT/ML pen Inject 5 units if BG is greater than 200; inject 10 units if BG is greater than 300 before meals.  TDD: 30 units 15 mL 3     insulin degludec (TRESIBA FLEXTOUCH) 200 UNIT/ML pen Inject 70 Units Subcutaneous daily 18 mL 3     insulin pen needle (32G X 4 MM) 32G X 4 MM miscellaneous Use 1 pen needles daily 100 each 3     Ipratropium-Albuterol (COMBIVENT RESPIMAT)  MCG/ACT inhaler Inhale 1 puff into the lungs 4 times daily       LANTUS SOLOSTAR 100 UNIT/ML soln        lisinopril (PRINIVIL/ZESTRIL) 40 MG tablet 40 mg daily       ONETOUCH DELICA LANCETS 33G MISC 1 each 3 times daily 100 each 10     PRIMIDONE PO Take by mouth At Bedtime       SIMVASTATIN PO Take 20 mg by mouth At Bedtime        triamcinolone (KENALOG) 0.1 % cream Apply topically 2 times daily       No Known Allergies    Reviewed and updated as needed this visit by Provider         Review of Systems   As noted in the HPI.       Objective    /70 (BP Location: Left arm, Patient Position: Chair, Cuff Size: Adult Large)   Pulse 94   Temp 98  F (36.7  C) (Tympanic)   Ht 1.626 m (5' 4\")   Wt 99.8 kg (220 lb)   SpO2 92%   BMI 37.76 kg/m    Body mass index is 37.76 kg/m .  Physical Exam   GENERAL: alert, no distress and obese  EYES: Eyes grossly normal to inspection, PERRL and conjunctivae and sclerae normal  HENT: right ear canal with warty growth, left ear canal normal, and TM's normal, nose and mouth without ulcers or lesions  NECK: no adenopathy, no asymmetry, masses, or scars and thyroid normal to palpation  RESP: lungs clear to auscultation - no rales, rhonchi or wheezes  CV: regular rate and rhythm, normal S1 S2, no S3 or S4, no murmur, click or rub, no peripheral edema and peripheral pulses strong  ABDOMEN: soft, nontender, no hepatosplenomegaly, no masses and bowel sounds normal  NEURO: Normal strength and tone, mentation intact " and speech normal  PSYCH: mentation appears normal, affect normal/bright    Diagnostic Test Results:  Labs reviewed in Epic  Results for orders placed or performed in visit on 07/01/19 (from the past 24 hour(s))   CBC with platelets and differential   Result Value Ref Range    WBC 9.0 4.0 - 11.0 10e9/L    RBC Count 5.36 (H) 3.8 - 5.2 10e12/L    Hemoglobin 16.9 (H) 11.7 - 15.7 g/dL    Hematocrit 49.0 (H) 35.0 - 47.0 %    MCV 91 78 - 100 fl    MCH 31.5 26.5 - 33.0 pg    MCHC 34.5 31.5 - 36.5 g/dL    RDW 14.1 10.0 - 15.0 %    Platelet Count 293 150 - 450 10e9/L    Diff Method Automated Method     % Neutrophils 62.9 %    % Lymphocytes 28.0 %    % Monocytes 6.1 %    % Eosinophils 1.8 %    % Basophils 0.9 %    % Immature Granulocytes 0.3 %    Nucleated RBCs 0 0 /100    Absolute Neutrophil 5.7 1.6 - 8.3 10e9/L    Absolute Lymphocytes 2.5 0.8 - 5.3 10e9/L    Absolute Monocytes 0.6 0.0 - 1.3 10e9/L    Absolute Eosinophils 0.2 0.0 - 0.7 10e9/L    Absolute Basophils 0.1 0.0 - 0.2 10e9/L    Abs Immature Granulocytes 0.0 0 - 0.4 10e9/L    Absolute Nucleated RBC 0.0    Basic metabolic panel   Result Value Ref Range    Sodium 133 133 - 144 mmol/L    Potassium 3.8 3.4 - 5.3 mmol/L    Chloride 96 94 - 109 mmol/L    Carbon Dioxide 31 20 - 32 mmol/L    Anion Gap 6 3 - 14 mmol/L    Glucose 366 (H) 70 - 99 mg/dL    Urea Nitrogen 19 7 - 30 mg/dL    Creatinine 1.42 (H) 0.52 - 1.04 mg/dL    GFR Estimate 41 (L) >60 mL/min/[1.73_m2]    GFR Estimate If Black 48 (L) >60 mL/min/[1.73_m2]    Calcium 10.5 (H) 8.5 - 10.1 mg/dL   CRP, inflammation   Result Value Ref Range    CRP Inflammation <2.9 0.0 - 8.0 mg/L           Assessment & Plan   (R68.89) Cold sweat  (primary encounter diagnosis)  (E11.9) Type 2 diabetes, HbA1c goal < 7% (H)  (R68.83) Chills (without fever)  (E11.65,  Z79.4) Type 2 diabetes mellitus with hyperglycemia, with long-term current use of insulin (H)  Comment: CBC, BMP, and CRP ok. I suspect her sweating issues are related to  "low blood sugars as she is now taking her insulins, however, it is difficult to tell as patient does have some developmental delays.   Plan: insulin degludec (TRESIBA FLEXTOUCH) 200         UNIT/ML pen        Will decrease her tresiba to 70 units daily from 84 units. Will communicate this dose change with her home care nurse. Her nurse comes once weekly and I suspect Marly is not checking her glucose often. Will have Marly check her glucose when the nurse is present to make sure she is doing this accurately. Marly was also encouraged to do this 4 times daily and bring back readings to her following appointment. Will also have Marly administer one insulin injection so home care nurse can make she is doing this appropriately. Lastly, will have nursing set up her medications for the week. A glucose monitor was also placed on patient today and she will then f/u with me next week for reassessment. She was told that if she gets any low readings under 70 at home, she needs to call us or go to the ER if feeling ill.      (D49.2) Growth of ear canal  Comment: growth in right ear canal, has a warty appearance  Plan: OTOLARYNGOLOGY REFERRAL        Will refer to ENT for removal.     (E83.52) Hypercalcemia  Comment: Calcium was high today at 10.5. PTH has been high in the past at 124 on 4/10/18.   Plan: While this is most likely due to renal insufficiency, will recheck vit D today. She tells me that she has been taking her vit D. If normal, will refer to endocrine. If not, will address with her nephrologist.           Tobacco Cessation:   reports that she has been smoking cigarettes.  She started smoking about 40 years ago. She has a 34.00 pack-year smoking history. She has never used smokeless tobacco.  Tobacco Cessation Action Plan: Information offered: Patient not interested at this time      BMI:   Estimated body mass index is 37.64 kg/m  as calculated from the following:    Height as of 6/27/19: 1.626 m (5' 4\").    " Weight as of 6/27/19: 99.5 kg (219 lb 4.8 oz).   Weight management plan: Discussed healthy diet and exercise guidelines        Amberly Whyte NP  [facility]

## 2019-06-27 ENCOUNTER — ALLIED HEALTH/NURSE VISIT (OUTPATIENT)
Dept: EDUCATION SERVICES | Facility: OTHER | Age: 54
End: 2019-06-27
Attending: NURSE PRACTITIONER
Payer: MEDICARE

## 2019-06-27 VITALS
OXYGEN SATURATION: 93 % | SYSTOLIC BLOOD PRESSURE: 112 MMHG | RESPIRATION RATE: 16 BRPM | WEIGHT: 219.3 LBS | HEIGHT: 64 IN | DIASTOLIC BLOOD PRESSURE: 70 MMHG | HEART RATE: 82 BPM | BODY MASS INDEX: 37.44 KG/M2

## 2019-06-27 DIAGNOSIS — Z72.0 TOBACCO ABUSE: ICD-10-CM

## 2019-06-27 DIAGNOSIS — E11.9 TYPE 2 DIABETES, HBA1C GOAL < 7% (H): ICD-10-CM

## 2019-06-27 DIAGNOSIS — Z71.6 TOBACCO ABUSE COUNSELING: ICD-10-CM

## 2019-06-27 PROCEDURE — 99214 OFFICE O/P EST MOD 30 MIN: CPT | Mod: 25 | Performed by: NURSE PRACTITIONER

## 2019-06-27 PROCEDURE — G0463 HOSPITAL OUTPT CLINIC VISIT: HCPCS

## 2019-06-27 PROCEDURE — 95251 CONT GLUC MNTR ANALYSIS I&R: CPT | Performed by: NURSE PRACTITIONER

## 2019-06-27 ASSESSMENT — MIFFLIN-ST. JEOR: SCORE: 1574.74

## 2019-06-27 ASSESSMENT — PAIN SCALES - GENERAL: PAINLEVEL: NO PAIN (0)

## 2019-06-27 NOTE — PROGRESS NOTES
"Chief Complaint   Patient presents with     Diabetes     Pt is in for a CGM eval. The CGM fell off on the 5th day.       Initial /70   Pulse 82   Resp 16   Ht 1.626 m (5' 4\")   Wt 99.5 kg (219 lb 4.8 oz)   SpO2 93%   BMI 37.64 kg/m   Estimated body mass index is 37.64 kg/m  as calculated from the following:    Height as of this encounter: 1.626 m (5' 4\").    Weight as of this encounter: 99.5 kg (219 lb 4.8 oz).  Medication Reconciliation: complete   Gege Walter      "

## 2019-06-27 NOTE — PROGRESS NOTES
SUBJECTIVE:  Marly Cannon, 53 year old, female presents with the following Chief Complaint(s) with HPI to follow:  Chief Complaint   Patient presents with     Diabetes     Pt is in for a CGM eval. The CGM fell off on the 5th day.        Diabetes Follow-up    Patient is checking blood sugars: CGM + 1-2x/day.  Results:  Listed on meter:  AM: 262-366  PM: 294-426    Documented on sheet  AM: 138-297  Lunch: 137 and 172  Supper: 136-172  Bedtime: 135-294      Symptoms of hypoglycemia (low blood sugar): none    Paresthesias (numbness or burning in feet) or sores: No    Diabetic eye exam within the last year: Yes    Breakfast eaten regularly: once in awhile     Patient counting carbs: Yes       HPI:  Marly's here today for the follow up regarding her Diabetes mellitus, Type 2.    Lab Results   Component Value Date    A1C 11.0 05/29/2019    A1C 9.8 02/22/2019    A1C 10.9 11/21/2018    A1C 9.6 08/21/2018    A1C 9.6 08/21/2018     Current Diabetes medication:   1.  Tresiba 80 units daily (supper time)--reports no missed doses  2.  Trulicity 1.5 mg weekly--reports no missed doses  3.  Novolog per sliding, 10 units; 15 units >300--reports no missed doses  ASA use: yes, 81 mg daily  Statin use: yes, simvastatin 20 mg daily    Marly's here today for her 2nd CGM study evaluation and possible insulin ajdust.  Denies any missed doses of insulin.  Denies any missed doses of Trulicity     Denies any new health concerns.       Patient Active Problem List   Diagnosis     Type 2 diabetes, HbA1c goal < 7% (H)     ACP (advance care planning)     Stage 3 chronic kidney disease (H)     Pulmonary emphysema (H)     Hyperparathyroidism due to renal insufficiency (H)     Morbid obesity (H)     Vitamin D deficiency     Intellectual disability     Breast fibrocystic disorder     Tobacco abuse     Microalbuminuria due to type 2 diabetes mellitus (H)     H/O colonoscopy     [place] cardiac risk <10% in next 10 years     Hypomagnesemia      Tubular adenoma of colon     Hyperlipidemia, unspecified hyperlipidemia type     Essential hypertension     Callus of foot       No past medical history on file.    Past Surgical History:   Procedure Laterality Date     COLONOSCOPY N/A 8/20/2015    Procedure: COLONOSCOPY;  Surgeon: Gentry Porter MD;  Location: [facility]     COLONOSCOPY N/A 9/21/2018    Procedure: COLONOSCOPY;  COLONOSCOPY WITH POLYPECTOMY;  Surgeon: Gentry Porter MD;  Location: [facility]       Family History   Problem Relation Age of Onset     Diabetes Mother      Diabetes Father      Hypertension Brother      Diabetes Sister        Social History     Tobacco Use     Smoking status: Current Every Day Smoker     Packs/day: 1.00     Years: 34.00     Pack years: 34.00     Types: Cigarettes     Start date: 1/1/1979     Smokeless tobacco: Never Used     Tobacco comment: Declined QP 04/24/19   Substance Use Topics     Alcohol use: No     Alcohol/week: 0.0 oz       Current Outpatient Medications   Medication Sig Dispense Refill     Acetaminophen (TYLENOL PO) Take 325 mg by mouth every 6 hours as needed        AMLODIPINE BESYLATE PO Take 2.5 mg by mouth daily       ammonium lactate (LAC-HYDRIN) 12 % cream Apply topically 2 times daily       aspirin 81 MG EC tablet Take 1 tablet (81 mg) by mouth daily 90 tablet 3     atorvastatin (LIPITOR) 40 MG tablet Take 1 tablet (40 mg) by mouth daily 90 tablet 3     blood glucose (NO BRAND SPECIFIED) test strip Use to test blood sugar 4 times daily or as directed. 300 strip 11     Cholecalciferol (VITAMIN D-3 PO) Take 2,000 Units by mouth daily       Cyclobenzaprine HCl (FLEXERIL PO) Take 10 mg by mouth 3 times daily as needed for muscle spasms       dulaglutide (TRULICITY) 1.5 MG/0.5ML pen Inject 1.5 mg Subcutaneous every 7 days 2 mL 3     HYDROCHLOROTHIAZIDE PO Take 12.5 mg by mouth daily        hydrocortisone 2.5 % cream Apply topically 2 times daily       insulin aspart (NOVOLOG PEN) 100 UNIT/ML pen Inject 5  "units if BG is greater than 200; inject 10 units if BG is greater than 300 before meals.  TDD: 30 units 15 mL 3     insulin degludec (TRESIBA FLEXTOUCH) 200 UNIT/ML pen Inject 84 Units Subcutaneous daily (Patient taking differently: Inject 80 Units Subcutaneous daily ) 18 mL 3     insulin pen needle (32G X 4 MM) 32G X 4 MM miscellaneous Use 1 pen needles daily 100 each 3     Ipratropium-Albuterol (COMBIVENT RESPIMAT)  MCG/ACT inhaler Inhale 1 puff into the lungs 4 times daily       lisinopril (PRINIVIL/ZESTRIL) 40 MG tablet 40 mg daily       LISINOPRIL PO Take 40 mg by mouth daily       ONETOUCH DELICA LANCETS 33G MISC 1 each 3 times daily 100 each 10     PRIMIDONE PO Take by mouth At Bedtime       SIMVASTATIN PO Take 20 mg by mouth At Bedtime        triamcinolone (KENALOG) 0.1 % cream Apply topically 2 times daily       LANTUS SOLOSTAR 100 UNIT/ML soln          No Known Allergies    REVIEW OF SYSTEMS  Skin: negative  Eyes: negative  Ears/Nose/Throat: negative  Respiratory: No shortness of breath, cough, or hemoptysis; sometimes SOB with walking long distance--using inhaler  Cardiovascular: negative  Gastrointestinal: negative  Genitourinary: negative  Musculoskeletal: negative  Neurologic: negative; history of headaches  Psychiatric: negative  Hematologic/Lymphatic/Immunologic: negative  Endocrine: positive for diabetes; sweaty spells     OBJECTIVE:  /70   Pulse 82   Resp 16   Ht 1.626 m (5' 4\")   Wt 99.5 kg (219 lb 4.8 oz)   SpO2 93%   BMI 37.64 kg/m    Constitutional: alert and no distress  Cardiovascular: RRR. Murmur noted.  No clicks gallops or rub  Respiratory:  Good diaphragmatic excursion. Lungs clear  Psychiatric: mentation appears normal for baseline and affect normal/bright    LABS  Results for orders placed or performed in visit on 19   GLUCOSE MONITOR, 72 HOUR, PHYS INTERP    Narrative    Findings:  Ave. S    Date:     Ave. Glucose: 367   Time in target: 0%  Time below " target: 0%  Time above target: 100%    Date: 6/13    Ave. Glucose: 343   Time in target: 0%  Time below target: 0%  Time above target: 100%    Date: 6/14    Ave. Glucose: 409   Time in target: 0%  Time below target: 0%  Time above target: 100%    Date: 6/15    Ave. Glucose: 316   Time in target: 0%  Time below target: 0%  Time above target: 100%    Date: 6/16    Ave. Glucose: 341   Time in target: 0%  Time below target: 0%  Time above target: 100%    Date:     Ave. Glucose: 296   Time in target: 0%  Time below target: 0%  Time above target: 100%    Date: 6/18    Ave. Glucose: 268   Time in target: 2%  Time below target: 0%  Time above target: 98%    Date: 6/19    Ave. Glucose: 296   Time in target: 0%  Time below target: 0%  Time above target: 100%    Date: 6/20    Ave. Glucose: 230   Time in target: 0%  Time below target: 0%  Time above target: 100%      Distribution Data:  Percentage above 180: 100%  Percentage within : 0%  Percentage below 70: 0%    Hypoglycemia incidence:  none    Food choices/Carbohydrate counting:  Didn't keep food logs    Exercise:  walks    Recommendations:  Increase basal insulin       ASSESSMENT / PLAN:  (E11.9) Type 2 diabetes, HbA1c goal < 7% (H)  Comment:   Noted CGM study    Findings:  Ave. S    Date:     Ave. Glucose: 367   Time in target: 0%  Time below target: 0%  Time above target: 100%    Date: e. Glucose: 343   Time in target: 0%  Time below target: 0%  Time above target: 100%    Date: e. Glucose: 409   Time in target: 0%  Time below target: 0%  Time above target: 100%    Date: 6/15    Ave. Glucose: 316   Time in target: 0%  Time below target: 0%  Time above target: 100%    Date: 6/16    Ave. Glucose: 341   Time in target: 0%  Time below target: 0%  Time above target: 100%    Date:     Ave. Glucose: 296   Time in target: 0%  Time below target: 0%  Time above target: 100%    Date: 6/18    Ave. Glucose: 268   Time in target: 2%  Time  below target: 0%  Time above target: 98%    Date: 6/19    Ave. Glucose: 296   Time in target: 0%  Time below target: 0%  Time above target: 100%    Date: 6/20    Ave. Glucose: 230   Time in target: 0%  Time below target: 0%  Time above target: 100%    Date: 6/21    Ave. Glucose: 356   Time in target: 0%  Time below target: 0%  Time above target: 100%    Date: 6/22    Ave. Glucose: 342   Time in target: 0%  Time below target: 0%  Time above target: 100%    Date: 6/23    Ave. Glucose: 330   Time in target: 0%  Time below target: 0%  Time above target: 100%    Date: 6/24    Ave. Glucose: 304   Time in target: 0%  Time below target: 0%  Time above target: 100%    Date: 6/25    Ave. Glucose: 292   Time in target: 0%  Time below target: 0%  Time above target: 100%    Distribution Data:  Percentage above 180: 100%  Percentage within : 0%  Percentage below 70: 0%    Hypoglycemia incidence:  none    Food choices/Carbohydrate counting:  Didn't keep food logs    Exercise:  walks    Recommendations:  Work on taking ALL prescribed insulin doses    Plan: GLUCOSE MONITOR, 72 HOUR, PHYS INTERP          Marly reports only have one meter  Boyfriend doesn't have diabetes  Noted there's a mismatch between BGs on meter vs documented.      Encourage her to work on taking ALL prescribed doses of insulin    (Z72.0) Tobacco abuse  Comment: noted  Plan: Tobacco Cessation - Order to Satisfy Health         Maintenance          As mentioned below    (Z71.6) Tobacco abuse counseling  Comment: noted and refused  Plan: Tobacco Cessation - Order to Satisfy Health         Maintenance                Marly's aware to let us know when she's ready to quit smoking.  Discussed the dangers of smoking with diabetes    Spoke to PHILOMENA Abdalla regarding Marly and the above discovery. Care coordinator not here today.   Will address it when I see her  Will talk to Marly's PCP.   Concerns with Marly not able to manage her DM on her own.  LM with  home care nurse    Patient Instructions   Continue working on healthy eating and moving (start low and slow, work up to 30 min, 5x/week)    BG goals:  Fasting and before meals <130, >70  2 hour after eating <180    We only need 1/2 of these numbers to be within target then your A1c will be within target    Diabetes Medications    1.  Trulicity  Trulicity 1.5 mg every Friday    2.  Novolog before meals  If you don't take your BG or BG >70 before meals: 10 units  If BG is greater than 300: 15 units     3.  Tresiba  80 units daily at supper    If you are sweaty, dizzy, shaky or lightheaded, please check a BG before injecting insulin    If you are interested in the Bud continuous glucose monitor (never have to poke your finger to check your BG again), start checking your BG 4 times a day    Follow up   One week       Call me sooner if any problems/concerns and/or questions develop including consistent low BGs <70 or consistent high BGs >200  [phone] (Unit Coordinator)    [phone] (Nurse)             HOW TO QUIT SMOKING  Smoking is one of the hardest habits to break. About half of all those who have ever smoked have been able to quit, and most of those (about 70%) who still smoke want to quit. Here are some of the best ways to stop smoking.     KEEP TRYING:  It takes most smokers about 8 tries before they are finally able to fully quit. So, the more often you try and fail, the better your chance of quitting the next time! So, don't give up!    GO COLD TURKEY:  Most ex-smokers quit cold turkey. Trying to cut back gradually doesn't seem to work as well, perhaps because it continues the smoking habit. Also, it is possible to fool yourself by inhaling more while smoking fewer cigarettes. This results in the same amount of nicotine in your body!    GET SUPPORT:  Support programs can make an important difference, especially for the heavy smoker. These groups offer lectures, methods to change your behavior and peer  support. Call the free national Quitline for more information. 800-QUIT-NOW (066-447-9339). Low-cost or free programs are offered by many hospitals, local chapters of the American Lung Association (957-161-1465) and the American Cancer Society (271-340-0269). Support at home is important too. Non-smokers can help by offering praise and encouragement. If the smoker fails to quit, encourage them to try again!    OVER-THE-COUNTER MEDICINES:  For those who can't quit on their own, Nicotine Replacement Therapy (NRT) may make quitting much easier. Certain aids such as the nicotine patch, gum and lozenge are available without a prescription. However, it is best to use these under the guidance of your doctor. The skin patch provides a steady supply of nicotine to the body. Nicotine gum and lozenge gives temporary bursts of low levels of nicotine. Both methods take the edge off the craving for cigarettes. WARNING: If you feel symptoms of nicotine overdose, such as nausea, vomiting, dizziness, weakness, or fast heartbeat, stop using these and see your doctor.    PRESCRIPTION MEDICINES:  After evaluating your smoking patterns and prior attempts at quitting, your doctor may offer a prescription medicine such as bupropion (Zyban, Wellbutrin), varenicline (Chantix, Champix), a niocotine inhaler or nasal spray. Each has its unique advantage and side effects which your doctor can review with you.    HEALTH BENEFITS OF QUITTING:  The benefits of quitting start right away and keep improving the longer you go without smokin minutes: blood pressure and pulse return to normal  8 hours: oxygen levels return to normal  2 days: ability to smell and taste begins to improve as damaged nerves start to regrow  2-3 weeks: circulation and lung function improves  1-9 months: decreased cough, congestion and shortness of breath; less tired  1 year: risk of heart attack decreases by half  5 years: risk of lung cancer decreases by half; risk of  stroke becomes the same as a non-smoker  For information about how to quit smoking, visit the following links:  National Cancer [place] ,   Clearing the Air, Quit Smoking Today   - an online booklet. http://www.smokefree.gov/pubs/clearing_the_air.pdf  Smokefree.gov http://smokefree.gov/  QuitNet http://www.quitnet.com/    4940-5441 Elisa Munson, [address] [place] [address], [place] [address]. All rights reserved. This information is not intended as a substitute for professional medical care. Always follow your healthcare professional's instructions.    The Benefits of Living Smoke Free  What do you want to gain from quitting? Check off some reasons to quit.  Health Benefits  ___ Reduce my risk of lung cancer, heart disease, chronic lung disease  ___ Have fewer wrinkles and softer skin  ___ Improve my sense of taste and smell  ___ For pregnant women--reduce the risk of having a miscarriage, stillbirth, premature birth, or low-birth-weight baby  Personal Benefits  ___ Feel more in control of my life  ___ Have better-smelling hair, breath, clothes, home, and car  ___ Save time by not having to take smoke breaks, buy cigarettes, or hunt for a light  ___ Have whiter teeth  Family Benefits  ___ Reduce my children s respiratory tract infections  ___ Set a good example for my children  ___ Reduce my family s cancer risk  Financial Benefits  ___ Save hundreds of dollars each year that would be spent on cigarettes  ___ Save money on medical bills  ___ Save on life, health, and car insurance premiums    Those Dollars Add Up!  Cigarettes are expensive, and getting more expensive all the time. Do you realize how much money you are spending on cigarettes per year? What is the average amount you spend on a pack of cigarettes? What is the average number of packs that you smoke per day? Using your answers to these questions, fill in this formula to help you find out:  ($ _____ per pack) ×  ( _____ number of packs per day) × (365  days) =  $ _____ yearly cost of smoking  Besides tobacco, there are other costs, including extra cleaning bills and replacement costs for clothing and furniture; medical expenses for smoking-related illnesses; and higher health, life, and car insurance premiums.    Cigars and Pipes Count Too!  Cigars and pipes are also dangerous. So are smokeless (chewing) tobacco and snuff. All of these products contain nicotine, a highly addictive substance that has harmful effects on your body. Quitting smoking means giving up all tobacco products.      2567-7855 [facility], [address] [place] [address]. All rights reserved. This information is not intended as a substitute for professional medical care. Always follow your healthcare professional's instructions.      Time: 50 minutes  Barrier: see [facility]  Willingness to learn: accepting    Courtney PINTO [facility]  Disease Management    Cc: Amberly Whyte NP    50 minutes was spent with patient.    Over 50%  of this time was spent on counseling patient regarding illness, medication and/or treatment options, coordinating further cares and follow ups that are needed along with resource material that will be helpful in the treatment of these issues.       With the electronic record, we can now more quickly and easily track our patient diabetic goals. Our diabetes clinical review is in progress and these are the indicators we are monitoring for good diabetes health.     1.) HbA1C less than 7 (measurement of your average blood sugars)  2.) Blood Pressure less than 140/80  3.) LDL less than 100 (bad cholesterol)  4.) HbA1C is checked in the last 6 months and below 7% (more frequently if not at goal or adjusting medications)  5.) LDL is checked in the last 12 months (more frequently if not at goal or adjusting medications)  6.) Taking one baby aspirin daily (unless otherwise instructed)  7.) No tobacco use  8) Statin use     You have achieved 6 out of 8 of these and I am  encouraging you to come in and get tuned up to achieve 8 out of 8!  Here is what you have achieved so far in my goals for you:  1.) HbA1C  less than 7:                              NO  Your last  HbA1C :  Lab Results   Component Value Date    A1C 11.0 05/29/2019    A1C 9.8 02/22/2019    A1C 10.9 11/21/2018    A1C 9.6 08/21/2018    A1C 9.6 08/21/2018     2.) Blood Pressure less than 140/80:       YES      Your last    BP Readings from Last 1 Encounters:   06/27/19 112/70     3.) LDL less than 100:                              YES      Your last     LDL Cholesterol Calculated   Date Value Ref Range Status   02/22/2019 86 100 mg/dL Final       4.) Checked HbA1C in the past 6 months: YES      5.) Checked LDL in the past 12 months:    YES      6.) Taking one aspirin daily:                       YES     7.) No tobacco use:                                        NO   8.) Statin use      YES

## 2019-07-01 ENCOUNTER — OFFICE VISIT (OUTPATIENT)
Dept: FAMILY MEDICINE | Facility: OTHER | Age: 54
End: 2019-07-01
Attending: NURSE PRACTITIONER
Payer: MEDICARE

## 2019-07-01 ENCOUNTER — ALLIED HEALTH/NURSE VISIT (OUTPATIENT)
Dept: EDUCATION SERVICES | Facility: OTHER | Age: 54
End: 2019-07-01
Attending: NURSE PRACTITIONER
Payer: MEDICARE

## 2019-07-01 ENCOUNTER — ANCILLARY PROCEDURE (OUTPATIENT)
Dept: MAMMOGRAPHY | Facility: OTHER | Age: 54
End: 2019-07-01
Attending: NURSE PRACTITIONER
Payer: MEDICARE

## 2019-07-01 ENCOUNTER — TELEPHONE (OUTPATIENT)
Dept: EDUCATION SERVICES | Facility: HOSPITAL | Age: 54
End: 2019-07-01

## 2019-07-01 ENCOUNTER — PATIENT OUTREACH (OUTPATIENT)
Dept: CARE COORDINATION | Facility: OTHER | Age: 54
End: 2019-07-01

## 2019-07-01 VITALS
WEIGHT: 220 LBS | TEMPERATURE: 98 F | DIASTOLIC BLOOD PRESSURE: 70 MMHG | HEART RATE: 94 BPM | HEIGHT: 64 IN | SYSTOLIC BLOOD PRESSURE: 110 MMHG | OXYGEN SATURATION: 92 % | BODY MASS INDEX: 37.56 KG/M2

## 2019-07-01 DIAGNOSIS — E83.52 HYPERCALCEMIA: ICD-10-CM

## 2019-07-01 DIAGNOSIS — Z12.31 VISIT FOR SCREENING MAMMOGRAM: ICD-10-CM

## 2019-07-01 DIAGNOSIS — E11.65 TYPE 2 DIABETES MELLITUS WITH HYPERGLYCEMIA, WITH LONG-TERM CURRENT USE OF INSULIN (H): ICD-10-CM

## 2019-07-01 DIAGNOSIS — E11.9 TYPE 2 DIABETES, HBA1C GOAL < 7% (H): ICD-10-CM

## 2019-07-01 DIAGNOSIS — R68.83 CHILLS (WITHOUT FEVER): ICD-10-CM

## 2019-07-01 DIAGNOSIS — Z79.4 TYPE 2 DIABETES MELLITUS WITH HYPERGLYCEMIA, WITH LONG-TERM CURRENT USE OF INSULIN (H): ICD-10-CM

## 2019-07-01 DIAGNOSIS — E11.9 TYPE 2 DIABETES, HBA1C GOAL < 7% (H): Primary | ICD-10-CM

## 2019-07-01 DIAGNOSIS — R68.89 COLD SWEAT: Primary | ICD-10-CM

## 2019-07-01 DIAGNOSIS — E21.3 HYPERPARATHYROIDISM (H): ICD-10-CM

## 2019-07-01 DIAGNOSIS — D49.2 GROWTH OF EAR CANAL: ICD-10-CM

## 2019-07-01 LAB
ANION GAP SERPL CALCULATED.3IONS-SCNC: 6 MMOL/L (ref 3–14)
BASOPHILS # BLD AUTO: 0.1 10E9/L (ref 0–0.2)
BASOPHILS NFR BLD AUTO: 0.9 %
BUN SERPL-MCNC: 19 MG/DL (ref 7–30)
CALCIUM SERPL-MCNC: 10.5 MG/DL (ref 8.5–10.1)
CHLORIDE SERPL-SCNC: 96 MMOL/L (ref 94–109)
CO2 SERPL-SCNC: 31 MMOL/L (ref 20–32)
CREAT SERPL-MCNC: 1.42 MG/DL (ref 0.52–1.04)
CRP SERPL-MCNC: <2.9 MG/L (ref 0–8)
DIFFERENTIAL METHOD BLD: ABNORMAL
EOSINOPHIL # BLD AUTO: 0.2 10E9/L (ref 0–0.7)
EOSINOPHIL NFR BLD AUTO: 1.8 %
ERYTHROCYTE [DISTWIDTH] IN BLOOD BY AUTOMATED COUNT: 14.1 % (ref 10–15)
GFR SERPL CREATININE-BSD FRML MDRD: 41 ML/MIN/{1.73_M2}
GLUCOSE SERPL-MCNC: 366 MG/DL (ref 70–99)
HCT VFR BLD AUTO: 49 % (ref 35–47)
HGB BLD-MCNC: 16.9 G/DL (ref 11.7–15.7)
IMM GRANULOCYTES # BLD: 0 10E9/L (ref 0–0.4)
IMM GRANULOCYTES NFR BLD: 0.3 %
LYMPHOCYTES # BLD AUTO: 2.5 10E9/L (ref 0.8–5.3)
LYMPHOCYTES NFR BLD AUTO: 28 %
MCH RBC QN AUTO: 31.5 PG (ref 26.5–33)
MCHC RBC AUTO-ENTMCNC: 34.5 G/DL (ref 31.5–36.5)
MCV RBC AUTO: 91 FL (ref 78–100)
MONOCYTES # BLD AUTO: 0.6 10E9/L (ref 0–1.3)
MONOCYTES NFR BLD AUTO: 6.1 %
NEUTROPHILS # BLD AUTO: 5.7 10E9/L (ref 1.6–8.3)
NEUTROPHILS NFR BLD AUTO: 62.9 %
NRBC # BLD AUTO: 0 10*3/UL
NRBC BLD AUTO-RTO: 0 /100
PLATELET # BLD AUTO: 293 10E9/L (ref 150–450)
POTASSIUM SERPL-SCNC: 3.8 MMOL/L (ref 3.4–5.3)
RBC # BLD AUTO: 5.36 10E12/L (ref 3.8–5.2)
SODIUM SERPL-SCNC: 133 MMOL/L (ref 133–144)
WBC # BLD AUTO: 9 10E9/L (ref 4–11)

## 2019-07-01 PROCEDURE — G0463 HOSPITAL OUTPT CLINIC VISIT: HCPCS

## 2019-07-01 PROCEDURE — 86140 C-REACTIVE PROTEIN: CPT | Mod: ZL | Performed by: NURSE PRACTITIONER

## 2019-07-01 PROCEDURE — 85025 COMPLETE CBC W/AUTO DIFF WBC: CPT | Mod: ZL | Performed by: NURSE PRACTITIONER

## 2019-07-01 PROCEDURE — 80048 BASIC METABOLIC PNL TOTAL CA: CPT | Mod: ZL | Performed by: NURSE PRACTITIONER

## 2019-07-01 PROCEDURE — 36415 COLL VENOUS BLD VENIPUNCTURE: CPT | Mod: ZL | Performed by: NURSE PRACTITIONER

## 2019-07-01 PROCEDURE — 77063 BREAST TOMOSYNTHESIS BI: CPT | Mod: TC

## 2019-07-01 PROCEDURE — 82306 VITAMIN D 25 HYDROXY: CPT | Mod: ZL | Performed by: NURSE PRACTITIONER

## 2019-07-01 PROCEDURE — 99214 OFFICE O/P EST MOD 30 MIN: CPT | Performed by: NURSE PRACTITIONER

## 2019-07-01 ASSESSMENT — PAIN SCALES - GENERAL: PAINLEVEL: NO PAIN (0)

## 2019-07-01 ASSESSMENT — MIFFLIN-ST. JEOR: SCORE: 1577.91

## 2019-07-01 ASSESSMENT — ACTIVITIES OF DAILY LIVING (ADL): DEPENDENT_IADLS:: INDEPENDENT

## 2019-07-01 NOTE — TELEPHONE ENCOUNTER
I spoke to Kat Dawn and she recommended I forward the message to Amberly Whyte as she was trying to contact Nikole.

## 2019-07-01 NOTE — NURSING NOTE
"Chief Complaint   Patient presents with     Excessive Sweating     x 1 week lasts most of the  day.        Initial /70 (BP Location: Left arm, Patient Position: Chair, Cuff Size: Adult Large)   Pulse 94   Temp 98  F (36.7  C) (Tympanic)   Ht 1.626 m (5' 4\")   Wt 99.8 kg (220 lb)   SpO2 92%   BMI 37.76 kg/m   Estimated body mass index is 37.76 kg/m  as calculated from the following:    Height as of this encounter: 1.626 m (5' 4\").    Weight as of this encounter: 99.8 kg (220 lb).  Medication Reconciliation: complete  "

## 2019-07-01 NOTE — TELEPHONE ENCOUNTER
See CC patient outreach note from today's date.    Lauren Pipkin, BS-RN   CHF and General Care Coordinator  [phone] Option # 1

## 2019-07-01 NOTE — PROGRESS NOTES
Clinic Care Coordination Contact  Care Team Conversations    CC left a message for Nikole pt's home care nurse, to call back to go over home care plan expectations from Amberly Whyte NP.  Pt will need ongoing close monitoring for diabetes management compliance.     Lauren Pipkin, BS-RN   CHF and General Care Coordinator  [phone] Option # 1

## 2019-07-01 NOTE — PATIENT INSTRUCTIONS
Continue working on healthy eating and moving (start low and slow, work up to 30 min, 5x/week)    BG goals:  Fasting and before meals <130, >70  2 hour after eating <180    We only need 1/2 of these numbers to be within target then your A1c will be within target    Diabetes Medications    1.  Trulicity  Trulicity 1.5 mg every Friday    2.  Novolog before meals  If you don't take your BG or BG >70 before meals: 10 units  If BG is greater than 300: 15 units     3.  Tresiba  80 units daily at supper    If you are sweaty, dizzy, shaky or lightheaded, please check a BG before injecting insulin    If you are interested in the Bud continuous glucose monitor (never have to poke your finger to check your BG again), start checking your BG 4 times a day    Follow up   One week       Call me sooner if any problems/concerns and/or questions develop including consistent low BGs <70 or consistent high BGs >200  [phone] (Unit Coordinator)    [phone] (Nurse)             HOW TO QUIT SMOKING  Smoking is one of the hardest habits to break. About half of all those who have ever smoked have been able to quit, and most of those (about 70%) who still smoke want to quit. Here are some of the best ways to stop smoking.     KEEP TRYING:  It takes most smokers about 8 tries before they are finally able to fully quit. So, the more often you try and fail, the better your chance of quitting the next time! So, don't give up!    GO COLD TURKEY:  Most ex-smokers quit cold turkey. Trying to cut back gradually doesn't seem to work as well, perhaps because it continues the smoking habit. Also, it is possible to fool yourself by inhaling more while smoking fewer cigarettes. This results in the same amount of nicotine in your body!    GET SUPPORT:  Support programs can make an important difference, especially for the heavy smoker. These groups offer lectures, methods to change your behavior and peer support. Call the free national Quitline  for more information. 800-QUIT-NOW (756-767-8024). Low-cost or free programs are offered by many hospitals, local chapters of the American Lung Association (341-345-8034) and the American Cancer Society (340-232-2028). Support at home is important too. Non-smokers can help by offering praise and encouragement. If the smoker fails to quit, encourage them to try again!    OVER-THE-COUNTER MEDICINES:  For those who can't quit on their own, Nicotine Replacement Therapy (NRT) may make quitting much easier. Certain aids such as the nicotine patch, gum and lozenge are available without a prescription. However, it is best to use these under the guidance of your doctor. The skin patch provides a steady supply of nicotine to the body. Nicotine gum and lozenge gives temporary bursts of low levels of nicotine. Both methods take the edge off the craving for cigarettes. WARNING: If you feel symptoms of nicotine overdose, such as nausea, vomiting, dizziness, weakness, or fast heartbeat, stop using these and see your doctor.    PRESCRIPTION MEDICINES:  After evaluating your smoking patterns and prior attempts at quitting, your doctor may offer a prescription medicine such as bupropion (Zyban, Wellbutrin), varenicline (Chantix, Champix), a niocotine inhaler or nasal spray. Each has its unique advantage and side effects which your doctor can review with you.    HEALTH BENEFITS OF QUITTING:  The benefits of quitting start right away and keep improving the longer you go without smokin minutes: blood pressure and pulse return to normal  8 hours: oxygen levels return to normal  2 days: ability to smell and taste begins to improve as damaged nerves start to regrow  2-3 weeks: circulation and lung function improves  1-9 months: decreased cough, congestion and shortness of breath; less tired  1 year: risk of heart attack decreases by half  5 years: risk of lung cancer decreases by half; risk of stroke becomes the same as a  non-smoker  For information about how to quit smoking, visit the following links:  National Cancer [place] ,   Clearing the Air, Quit Smoking Today   - an online booklet. http://www.smokefree.gov/pubs/clearing_the_air.pdf  Smokefree.gov http://smokefree.gov/  QuitNet http://www.quitnet.com/    1678-3457 Elisa Munson, [address] [place] [address], [place] [address]. All rights reserved. This information is not intended as a substitute for professional medical care. Always follow your healthcare professional's instructions.    The Benefits of Living Smoke Free  What do you want to gain from quitting? Check off some reasons to quit.  Health Benefits  ___ Reduce my risk of lung cancer, heart disease, chronic lung disease  ___ Have fewer wrinkles and softer skin  ___ Improve my sense of taste and smell  ___ For pregnant women--reduce the risk of having a miscarriage, stillbirth, premature birth, or low-birth-weight baby  Personal Benefits  ___ Feel more in control of my life  ___ Have better-smelling hair, breath, clothes, home, and car  ___ Save time by not having to take smoke breaks, buy cigarettes, or hunt for a light  ___ Have whiter teeth  Family Benefits  ___ Reduce my children s respiratory tract infections  ___ Set a good example for my children  ___ Reduce my family s cancer risk  Financial Benefits  ___ Save hundreds of dollars each year that would be spent on cigarettes  ___ Save money on medical bills  ___ Save on life, health, and car insurance premiums    Those Dollars Add Up!  Cigarettes are expensive, and getting more expensive all the time. Do you realize how much money you are spending on cigarettes per year? What is the average amount you spend on a pack of cigarettes? What is the average number of packs that you smoke per day? Using your answers to these questions, fill in this formula to help you find out:  ($ _____ per pack) ×  ( _____ number of packs per day) × (365 days) =  $ _____ yearly cost of  smoking  Besides tobacco, there are other costs, including extra cleaning bills and replacement costs for clothing and furniture; medical expenses for smoking-related illnesses; and higher health, life, and car insurance premiums.    Cigars and Pipes Count Too!  Cigars and pipes are also dangerous. So are smokeless (chewing) tobacco and snuff. All of these products contain nicotine, a highly addictive substance that has harmful effects on your body. Quitting smoking means giving up all tobacco products.      2984-1153 Elisa [facility], [address] [place] [address], [place] [address]. All rights reserved. This information is not intended as a substitute for professional medical care. Always follow your healthcare professional's instructions.

## 2019-07-01 NOTE — PROGRESS NOTES
Clinic Care Coordination Contact  Care Team Conversations    Care coordinator attended office visit with pt and PCP Amberly Whyte this date.  Please refer to today's progress note for updates to patient's plan of care.    -CC assisted with scheduling Marly to see Dr Eason here at [facility] on 7/17/19 9AM.    -She will have a CGM placed today in the clinic- provider suspects sweating episodes may be related to lows.  She will return 7/11/19 and 7/18/19 with Courtney Chaudhary NP for CGM evals.    -Amberly Whyte NP will be seeing her in clinic 7/9 for follow-up.  -Pt was referred to ENT for growth in her right ear.  CC will assure follow through with this appointment.    -Pt was provided with [facility] phone number for medical rides through [place].  Encouraged to call to set up rides for all of her upcoming appointments.    -CC will be in contact with pt's home care nurse to update her and assure follow through with the diabetes care plan as well as overall compliance at home.    Encouraged pt to call care coordinator with any questions/concerns/problems.  Verbalized understanding.    Lauren Pipkin, BS-RN   CHF and General Care Coordinator  [phone] Option # 1

## 2019-07-01 NOTE — PROGRESS NOTES
Marly is here for a glucose sensor insertion for a CGM study as requested by Amberly Whyte. Sensor agreement signed.    Sensor attached to left upper arm. No redness, drainage or bleeding noted. Patient instructed on testing schedule, how to complete the patient log, restrictions during wear and to remove if having an x-ray, MRI or CT.    Patient return date requested from Amberly Whyte.    Freestyle Bud Sensor:  Lot#:[id number]  Expiration Date: 10/31/19  Sensor S/N: [id number]    Patient's identity was verified by using patient's name and date of birth prior to insertion.    Sensor started and 2 minute re-check completed.    Written instructions and patient log given to patient.  Gege Walter

## 2019-07-02 DIAGNOSIS — N25.81 HYPERPARATHYROIDISM DUE TO RENAL INSUFFICIENCY (H): Primary | ICD-10-CM

## 2019-07-02 LAB — DEPRECATED CALCIDIOL+CALCIFEROL SERPL-MC: 45 UG/L (ref 20–75)

## 2019-07-02 PROCEDURE — 99000 SPECIMEN HANDLING OFFICE-LAB: CPT | Performed by: NURSE PRACTITIONER

## 2019-07-02 PROCEDURE — 83970 ASSAY OF PARATHORMONE: CPT | Mod: 90 | Performed by: NURSE PRACTITIONER

## 2019-07-02 PROCEDURE — 36415 COLL VENOUS BLD VENIPUNCTURE: CPT | Performed by: NURSE PRACTITIONER

## 2019-07-02 NOTE — PROGRESS NOTES
Clinic Care Coordination Contact  Care Team Conversations    CC spoke with Nikole RN with [employer] today.  Advised that Marly's home care plan should include the following:  - Nikole should supervise Marly giving herself her Trulicity injection every week.  This can be changed from Fridays to Tuesdays to work for their scheduled day of the week that the home visit occurs.    -Nurse should have pt check her blood glucose when she is there every week.  Compare readings on pt's glucometer to what she is writing down on her log  Need to report any patterns of highs, lows, or pt failing to check blood glucose for several days in a row, etc  -Nurse should check insulin supply weekly, check testing supplies weekly- report any concerns  Nikole will add the above to the care plan.  CC reviewed upcoming appointments including appointment with Dr Eason on 7/17 which is not in [facility].  CC encouraged her to reach out to us with any concerns and we can make adjustments or add to the plan of care.    Lauren Pipkin, BS-RN   CHF and General Care Coordinator  [phone] Option # 1

## 2019-07-03 LAB — PTH-INTACT SERPL-MCNC: 38 PG/ML (ref 18–80)

## 2019-07-05 NOTE — PROGRESS NOTES
"Subjective     Marly Cannon is a 53 year old female who presents to clinic today for the following health issues:    HPI   FOLLOW UP SWEATING SPELLS    Do note, patient was seen on 7/1/19 with sweating spells. She does have type 2 DM and [facility]. She was noncompliant with her insulin, however, home care recently got involved to help with medication compliance. Prior to this, her insulin had continually been increased as her A1C kept increasing. When home care got involved, she was found to not be taking her insulins. During the week prior to her seeing me, she noted that she had been taking her insulin as prescibed. CRP was normal. CBC was unremarkable. BMP appeared ok. Her tresiba was decreased and she follows up today. *    Today she tells me that she continues to have sweating spells. She does have some intellectual disabilities and has trouble articulating how she feels. She notes that she often feels lightheaded and then sweats profusely. She notes that it feels like she is going to pass out, but never has. She states that the warmer weather makes it worse. She states that she was recently at a parade and felt so poor that she thought about asking EMS to help her. She notes that this occurs about every other day. At one point, she tells me that she has never checked her blood sugar during one of these episodes, but she later tells me that she checked it once and it was \"between 101 and 102.\" A sensor was placed on her at our last visit, but she notes that it fell off right away. Her diabetes is very poorly controlled with a recent A1C of 11. She states that she has been taking her medications as prescribed, but when I ask what she is taking, she is unsure. She denies chest pain, but complains of dyspnea when walking outside. She does have COPD and states that she has been using her inhalers as prescribed. She does smoke about 2 ppd. No interest in quitting. No fevers. No joint " aches. No abdominal pain. No nausea. No vomiting. No rashes. She does complain of left knee pain. This has been present times 1 week. No erythema or edema. No known injury. Worse with walking. She states that she has no instability, but her knee often pops. She has tried taking ibuprofen without relief. She notes that the pain gets severe at times and wonders if this what makes her pass out.          Patient Active Problem List   Diagnosis     Type 2 diabetes, HbA1c goal < 7% (H)     ACP (advance care planning)     Stage 3 chronic kidney disease (H)     Pulmonary emphysema (H)     Hyperparathyroidism due to renal insufficiency (H)     Morbid obesity (H)     Vitamin D deficiency     Intellectual disability     Breast fibrocystic disorder     Tobacco abuse     Microalbuminuria due to type 2 diabetes mellitus (H)     H/O colonoscopy     [place] cardiac risk <10% in next 10 years     Hypomagnesemia     Tubular adenoma of colon     Hyperlipidemia, unspecified hyperlipidemia type     Essential hypertension     Callus of foot     Past Surgical History:   Procedure Laterality Date     COLONOSCOPY N/A 8/20/2015    Procedure: COLONOSCOPY;  Surgeon: Gentry Porter MD;  Location: [facility] OR     COLONOSCOPY N/A 9/21/2018    Procedure: COLONOSCOPY;  COLONOSCOPY WITH POLYPECTOMY;  Surgeon: Gentry Porter MD;  Location: [facility] OR       Social History     Tobacco Use     Smoking status: Current Every Day Smoker     Packs/day: 1.00     Years: 34.00     Pack years: 34.00     Types: Cigarettes     Start date: 1/1/1979     Smokeless tobacco: Never Used     Tobacco comment: Declined QP 04/24/19   Substance Use Topics     Alcohol use: No     Alcohol/week: 0.0 oz     Family History   Problem Relation Age of Onset     Diabetes Mother      Diabetes Father      Hypertension Brother      Diabetes Sister          Current Outpatient Medications   Medication Sig Dispense Refill     Acetaminophen (TYLENOL PO) Take 325 mg by mouth every 6  "hours as needed        ammonium lactate (LAC-HYDRIN) 12 % cream Apply topically 2 times daily       aspirin 81 MG EC tablet Take 1 tablet (81 mg) by mouth daily 90 tablet 3     atorvastatin (LIPITOR) 40 MG tablet Take 1 tablet (40 mg) by mouth daily 90 tablet 3     blood glucose (NO BRAND SPECIFIED) test strip Use to test blood sugar 4 times daily or as directed. 300 strip 11     Cholecalciferol (VITAMIN D-3 PO) Take 2,000 Units by mouth daily       Cyclobenzaprine HCl (FLEXERIL PO) Take 10 mg by mouth 3 times daily as needed for muscle spasms       dulaglutide (TRULICITY) 1.5 MG/0.5ML pen Inject 1.5 mg Subcutaneous every 7 days 2 mL 3     HYDROCHLOROTHIAZIDE PO Take 12.5 mg by mouth daily        hydrocortisone 2.5 % cream Apply topically 2 times daily       insulin aspart (NOVOLOG PEN) 100 UNIT/ML pen Inject 5 units if BG is greater than 200; inject 10 units if BG is greater than 300 before meals.  TDD: 30 units 15 mL 3     insulin degludec (TRESIBA FLEXTOUCH) 200 UNIT/ML pen Inject 70 Units Subcutaneous daily 18 mL 3     insulin pen needle (32G X 4 MM) 32G X 4 MM miscellaneous Use 1 pen needles daily 100 each 3     Ipratropium-Albuterol (COMBIVENT RESPIMAT)  MCG/ACT inhaler Inhale 1 puff into the lungs 4 times daily       LANTUS SOLOSTAR 100 UNIT/ML soln        lisinopril (PRINIVIL/ZESTRIL) 40 MG tablet 40 mg daily       ONETOUCH DELICA LANCETS 33G MISC 1 each 3 times daily 100 each 10     PRIMIDONE PO Take by mouth At Bedtime       SIMVASTATIN PO Take 20 mg by mouth At Bedtime        triamcinolone (KENALOG) 0.1 % cream Apply topically 2 times daily       No Known Allergies    Reviewed and updated as needed this visit by Provider         Review of Systems   As noted in the HPI.       Objective    /76 (BP Location: Left arm, Patient Position: Chair, Cuff Size: Adult Large)   Pulse 89   Temp 97.8  F (36.6  C) (Tympanic)   Ht 1.626 m (5' 4\")   Wt 100.2 kg (221 lb)   SpO2 91%   BMI 37.93 kg/m    Body " mass index is 37.93 kg/m .  Physical Exam   GENERAL: alert, no distress and obese  EYES: Eyes grossly normal to inspection, PERRL and conjunctivae and sclerae normal  HENT: ear canals and TM's normal, nose and mouth without ulcers or lesions  NECK: no adenopathy, no asymmetry, masses, or scars and thyroid normal to palpation  RESP: lungs clear to auscultation - no rales, rhonchi or wheezes  CV: regular rate and rhythm, normal S1 S2, no S3 or S4, no murmur  NEURO: Normal strength and tone, mentation intact and speech normal  PSYCH: mentation appears normal, affect normal/bright  LOWER LEGS-1+ edema bilaterally, no pain in calves  LEFT KNEE: Skin intact. No erythema or edema. No effusion. No instability. Negative Lachmans. Some pain with flexion and extension.     Diagnostic Test Results:  Labs reviewed in Epic    Left Knee XR: FINDINGS: No acute fracture or dislocation is seen. Joint spaces are  fairly well preserved. There are very small posterior patellar  osteophytes. No joint effusion is seen.                                                                 IMPRESSION: Very mild degenerative change.      EKG: Sinus rhythm with first degree AV block, ST and T wave abnormality also present    Assessment & Plan   (M25.562) Acute pain of left knee  (primary encounter diagnosis)  Comment: most likely sprain, XR normal  Plan: XR Knee Left 3 Views        Rest, ice, and tylenol recommended. F/u 2 weeks. If pain persists, will consider PT referral or MRI.     (R42) Dizziness  (R06.09) Dyspnea on exertion  (E11.29,  R80.9,  Z79.4) Type 2 diabetes mellitus with microalbuminuria, with long-term current use of insulin   (I10) Essential hypertension  (J43.9) Pulmonary emphysema, unspecified emphysema type (H)  (F79) Intellectual disability  (Z72.0) Tobacco abuse  (E78.5) Hyperlipidemia, unspecified hyperlipidemia type  Plan: Patient has difficulty articulating symptoms with her intellectual delay. Seems to be having some  "dizziness with dyspnea on exertion. Unsure what her blood sugars have been as I do not think she is checking them. A sensor was placed at our last visit, but she notes that it fell off. [facility] with the [facility] on [facility] and a new sensor will be placed. Symptoms could be related to low or high glucose readings. Will see what sensor shows. She does have emphysema, but this RUBY is new. Will therefore order a stress test. She is at high risk for CAD given tobacco use, hyperlipidemia, and uncontrolled DM. Will notify patient of the results when available and intervene accordingly. Will also order Holter monitor and carotid US due to her dizziness. BP was also slightly low today. Will stop her amlodipine and reassess BP in 2 weeks. She agrees to go to the ER in the meantime with any new or worsening symptoms.          Tobacco Cessation:   reports that she has been smoking cigarettes.  She started smoking about 40 years ago. She has a 34.00 pack-year smoking history. She has never used smokeless tobacco.  Tobacco Cessation Action Plan: Information offered: Patient not interested at this time      BMI:   Estimated body mass index is 37.76 kg/m  as calculated from the following:    Height as of 7/1/19: 1.626 m (5' 4\").    Weight as of 7/1/19: 99.8 kg (220 lb).   Weight management plan: Discussed healthy diet and exercise guidelines    Patient use to live with [facility]. Health was much better controlled. Did discuss her moving back there, but she declined. She states that she would never do this.     Amberly Whyte NP  [facility] [place]      "

## 2019-07-09 ENCOUNTER — ANCILLARY PROCEDURE (OUTPATIENT)
Dept: GENERAL RADIOLOGY | Facility: OTHER | Age: 54
End: 2019-07-09
Attending: NURSE PRACTITIONER
Payer: MEDICARE

## 2019-07-09 ENCOUNTER — OFFICE VISIT (OUTPATIENT)
Dept: FAMILY MEDICINE | Facility: OTHER | Age: 54
End: 2019-07-09
Attending: NURSE PRACTITIONER
Payer: MEDICARE

## 2019-07-09 VITALS
DIASTOLIC BLOOD PRESSURE: 76 MMHG | OXYGEN SATURATION: 91 % | TEMPERATURE: 97.8 F | HEIGHT: 64 IN | WEIGHT: 221 LBS | HEART RATE: 89 BPM | SYSTOLIC BLOOD PRESSURE: 102 MMHG | BODY MASS INDEX: 37.73 KG/M2

## 2019-07-09 DIAGNOSIS — R42 DIZZINESS: ICD-10-CM

## 2019-07-09 DIAGNOSIS — I10 ESSENTIAL HYPERTENSION: ICD-10-CM

## 2019-07-09 DIAGNOSIS — M25.562 ACUTE PAIN OF LEFT KNEE: Primary | ICD-10-CM

## 2019-07-09 DIAGNOSIS — F79 INTELLECTUAL DISABILITY: ICD-10-CM

## 2019-07-09 DIAGNOSIS — Z72.0 TOBACCO ABUSE: ICD-10-CM

## 2019-07-09 DIAGNOSIS — Z79.4 TYPE 2 DIABETES MELLITUS WITH MICROALBUMINURIA, WITH LONG-TERM CURRENT USE OF INSULIN (H): ICD-10-CM

## 2019-07-09 DIAGNOSIS — R80.9 TYPE 2 DIABETES MELLITUS WITH MICROALBUMINURIA, WITH LONG-TERM CURRENT USE OF INSULIN (H): ICD-10-CM

## 2019-07-09 DIAGNOSIS — R06.09 DYSPNEA ON EXERTION: ICD-10-CM

## 2019-07-09 DIAGNOSIS — E78.5 HYPERLIPIDEMIA, UNSPECIFIED HYPERLIPIDEMIA TYPE: ICD-10-CM

## 2019-07-09 DIAGNOSIS — J43.9 PULMONARY EMPHYSEMA, UNSPECIFIED EMPHYSEMA TYPE (H): ICD-10-CM

## 2019-07-09 DIAGNOSIS — E11.29 TYPE 2 DIABETES MELLITUS WITH MICROALBUMINURIA, WITH LONG-TERM CURRENT USE OF INSULIN (H): ICD-10-CM

## 2019-07-09 DIAGNOSIS — M25.562 ACUTE PAIN OF LEFT KNEE: ICD-10-CM

## 2019-07-09 PROCEDURE — 93010 ELECTROCARDIOGRAM REPORT: CPT | Performed by: INTERNAL MEDICINE

## 2019-07-09 PROCEDURE — 93005 ELECTROCARDIOGRAM TRACING: CPT

## 2019-07-09 PROCEDURE — 73562 X-RAY EXAM OF KNEE 3: CPT | Mod: TC,LT

## 2019-07-09 PROCEDURE — G0463 HOSPITAL OUTPT CLINIC VISIT: HCPCS

## 2019-07-09 PROCEDURE — 99214 OFFICE O/P EST MOD 30 MIN: CPT | Mod: 25 | Performed by: NURSE PRACTITIONER

## 2019-07-09 ASSESSMENT — MIFFLIN-ST. JEOR: SCORE: 1582.45

## 2019-07-09 ASSESSMENT — PAIN SCALES - GENERAL: PAINLEVEL: MODERATE PAIN (5)

## 2019-07-09 NOTE — NURSING NOTE
"Chief Complaint   Patient presents with     Musculoskeletal Problem     follow up left knee      Excessive Sweating       Initial /76 (BP Location: Left arm, Patient Position: Chair, Cuff Size: Adult Large)   Pulse 89   Temp 97.8  F (36.6  C) (Tympanic)   Ht 1.626 m (5' 4\")   Wt 100.2 kg (221 lb)   SpO2 91%   BMI 37.93 kg/m   Estimated body mass index is 37.93 kg/m  as calculated from the following:    Height as of this encounter: 1.626 m (5' 4\").    Weight as of this encounter: 100.2 kg (221 lb).  Medication Reconciliation: complete  "

## 2019-07-11 ENCOUNTER — ALLIED HEALTH/NURSE VISIT (OUTPATIENT)
Dept: EDUCATION SERVICES | Facility: OTHER | Age: 54
End: 2019-07-11
Attending: NURSE PRACTITIONER
Payer: MEDICARE

## 2019-07-11 ENCOUNTER — PATIENT OUTREACH (OUTPATIENT)
Dept: CARE COORDINATION | Facility: OTHER | Age: 54
End: 2019-07-11

## 2019-07-11 VITALS
BODY MASS INDEX: 36.88 KG/M2 | OXYGEN SATURATION: 96 % | SYSTOLIC BLOOD PRESSURE: 120 MMHG | HEIGHT: 64 IN | DIASTOLIC BLOOD PRESSURE: 70 MMHG | HEART RATE: 91 BPM | WEIGHT: 216 LBS

## 2019-07-11 DIAGNOSIS — Z71.6 TOBACCO ABUSE COUNSELING: ICD-10-CM

## 2019-07-11 DIAGNOSIS — Z79.4 TYPE 2 DIABETES MELLITUS WITH HYPERGLYCEMIA, WITH LONG-TERM CURRENT USE OF INSULIN (H): ICD-10-CM

## 2019-07-11 DIAGNOSIS — E11.65 TYPE 2 DIABETES MELLITUS WITH HYPERGLYCEMIA, WITH LONG-TERM CURRENT USE OF INSULIN (H): ICD-10-CM

## 2019-07-11 DIAGNOSIS — Z72.0 TOBACCO ABUSE: ICD-10-CM

## 2019-07-11 PROCEDURE — 99213 OFFICE O/P EST LOW 20 MIN: CPT | Performed by: NURSE PRACTITIONER

## 2019-07-11 PROCEDURE — 95250 CONT GLUC MNTR PHYS/QHP EQP: CPT | Performed by: NURSE PRACTITIONER

## 2019-07-11 PROCEDURE — G0463 HOSPITAL OUTPT CLINIC VISIT: HCPCS | Mod: 27

## 2019-07-11 PROCEDURE — G0463 HOSPITAL OUTPT CLINIC VISIT: HCPCS | Mod: 25

## 2019-07-11 ASSESSMENT — MIFFLIN-ST. JEOR: SCORE: 1559.77

## 2019-07-11 ASSESSMENT — PAIN SCALES - GENERAL: PAINLEVEL: NO PAIN (0)

## 2019-07-11 ASSESSMENT — ACTIVITIES OF DAILY LIVING (ADL): DEPENDENT_IADLS:: INDEPENDENT

## 2019-07-11 NOTE — PROGRESS NOTES
"Chief Complaint   Patient presents with     Diabetes Education       Initial There were no vitals taken for this visit. Estimated body mass index is 37.93 kg/m  as calculated from the following:    Height as of 7/9/19: 1.626 m (5' 4\").    Weight as of 7/9/19: 100.2 kg (221 lb).  Medication Reconciliation: complete   JOSE ALEJANDRO BYRNE      "

## 2019-07-11 NOTE — PATIENT INSTRUCTIONS
Continue working on healthy eating and moving (start low and slow, work up to 30 min, 5x/week)    BG goals:  Fasting and before meals <130, >70  2 hour after eating <180    We only need 1/2 of these numbers to be within target then your A1c will be within target    Diabetes Medications    1.  Trulicity  Trulicity 1.5 mg every Friday    2.  Novolog before meals  If you don't take your BG or BG >70 before meals: 10 units  If BG is greater than 300: 15 units     3.  Tresiba  84 units daily at supper    If you are sweaty, dizzy, shaky or lightheaded, please check a BG before injecting insulin    If you are interested in the Bud continuous glucose monitor (never have to poke your finger to check your BG again), start checking your BG 4 times a day    Follow up   One week   Come after your ultrasound to put on another CGM study    Call me sooner if any problems/concerns and/or questions develop including consistent low BGs <70 or consistent high BGs >200  [phone] (Unit Coordinator)    [phone] (Nurse)         HOW TO QUIT SMOKING  Smoking is one of the hardest habits to break. About half of all those who have ever smoked have been able to quit, and most of those (about 70%) who still smoke want to quit. Here are some of the best ways to stop smoking.     KEEP TRYING:  It takes most smokers about 8 tries before they are finally able to fully quit. So, the more often you try and fail, the better your chance of quitting the next time! So, don't give up!    GO COLD TURKEY:  Most ex-smokers quit cold turkey. Trying to cut back gradually doesn't seem to work as well, perhaps because it continues the smoking habit. Also, it is possible to fool yourself by inhaling more while smoking fewer cigarettes. This results in the same amount of nicotine in your body!    GET SUPPORT:  Support programs can make an important difference, especially for the heavy smoker. These groups offer lectures, methods to change your behavior  and peer support. Call the free national Quitline for more information. 800-QUIT-NOW (289-088-6694). Low-cost or free programs are offered by many hospitals, local chapters of the American Lung Association (423-005-6103) and the American Cancer Society (007-368-0983). Support at home is important too. Non-smokers can help by offering praise and encouragement. If the smoker fails to quit, encourage them to try again!    OVER-THE-COUNTER MEDICINES:  For those who can't quit on their own, Nicotine Replacement Therapy (NRT) may make quitting much easier. Certain aids such as the nicotine patch, gum and lozenge are available without a prescription. However, it is best to use these under the guidance of your doctor. The skin patch provides a steady supply of nicotine to the body. Nicotine gum and lozenge gives temporary bursts of low levels of nicotine. Both methods take the edge off the craving for cigarettes. WARNING: If you feel symptoms of nicotine overdose, such as nausea, vomiting, dizziness, weakness, or fast heartbeat, stop using these and see your doctor.    PRESCRIPTION MEDICINES:  After evaluating your smoking patterns and prior attempts at quitting, your doctor may offer a prescription medicine such as bupropion (Zyban, Wellbutrin), varenicline (Chantix, Champix), a niocotine inhaler or nasal spray. Each has its unique advantage and side effects which your doctor can review with you.    HEALTH BENEFITS OF QUITTING:  The benefits of quitting start right away and keep improving the longer you go without smokin minutes: blood pressure and pulse return to normal  8 hours: oxygen levels return to normal  2 days: ability to smell and taste begins to improve as damaged nerves start to regrow  2-3 weeks: circulation and lung function improves  1-9 months: decreased cough, congestion and shortness of breath; less tired  1 year: risk of heart attack decreases by half  5 years: risk of lung cancer decreases by  half; risk of stroke becomes the same as a non-smoker  For information about how to quit smoking, visit the following links:  National Cancer [place] ,   Clearing the Air, Quit Smoking Today   - an online booklet. http://www.smokefree.gov/pubs/clearing_the_air.pdf  Smokefree.gov http://smokefree.gov/  QuitNet http://www.quitnet.com/    7249-3283 Elisa Munson, [address] [place] [address], [place] [address]. All rights reserved. This information is not intended as a substitute for professional medical care. Always follow your healthcare professional's instructions.    The Benefits of Living Smoke Free  What do you want to gain from quitting? Check off some reasons to quit.  Health Benefits  ___ Reduce my risk of lung cancer, heart disease, chronic lung disease  ___ Have fewer wrinkles and softer skin  ___ Improve my sense of taste and smell  ___ For pregnant women--reduce the risk of having a miscarriage, stillbirth, premature birth, or low-birth-weight baby  Personal Benefits  ___ Feel more in control of my life  ___ Have better-smelling hair, breath, clothes, home, and car  ___ Save time by not having to take smoke breaks, buy cigarettes, or hunt for a light  ___ Have whiter teeth  Family Benefits  ___ Reduce my children s respiratory tract infections  ___ Set a good example for my children  ___ Reduce my family s cancer risk  Financial Benefits  ___ Save hundreds of dollars each year that would be spent on cigarettes  ___ Save money on medical bills  ___ Save on life, health, and car insurance premiums    Those Dollars Add Up!  Cigarettes are expensive, and getting more expensive all the time. Do you realize how much money you are spending on cigarettes per year? What is the average amount you spend on a pack of cigarettes? What is the average number of packs that you smoke per day? Using your answers to these questions, fill in this formula to help you find out:  ($ _____ per pack) ×  ( _____ number of packs per  day) × (365 days) =  $ _____ yearly cost of smoking  Besides tobacco, there are other costs, including extra cleaning bills and replacement costs for clothing and furniture; medical expenses for smoking-related illnesses; and higher health, life, and car insurance premiums.    Cigars and Pipes Count Too!  Cigars and pipes are also dangerous. So are smokeless (chewing) tobacco and snuff. All of these products contain nicotine, a highly addictive substance that has harmful effects on your body. Quitting smoking means giving up all tobacco products.      1824-6384 Elisa Munson, [address] [place] [address]. All rights reserved. This information is not intended as a substitute for professional medical care. Always follow your healthcare professional's instructions.

## 2019-07-11 NOTE — PROGRESS NOTES
Clinic Care Coordination Contact  Care Team Conversations    CC met with pt briefly at the conclusion of her appointment with Courtney Chaudhary NP today.  Appt scheduled for her to return tomorrow after her scheduled US to have a new CGM placed.  CC printed AVS and reviewed upcoming appointments with pt.  She denies concerns about being able to make all of the scheduled appointments.    CC also phoned pt's home care nurse Nikole and updated her.    Lauren Pipkin, BS-RN   CHF and General Care Coordinator  [phone] Option # 1

## 2019-07-11 NOTE — PROGRESS NOTES
SUBJECTIVE:  Marly Cannon, 53 year old, female presents with the following Chief Complaint(s) with HPI to follow:  Chief Complaint   Patient presents with     Diabetes Education        Diabetes Follow-up    Patient is checking blood sugars: 1-2x/day.  Results:  Listed on meter:  Breakfast: 247-374  Lunch: 298 and 377    Documented on sheet  AM: 141-377  Lunch: 137 and 172  Supper: 136-170  Bedtime: 135-294      Symptoms of hypoglycemia (low blood sugar): none    Paresthesias (numbness or burning in feet) or sores: No    Diabetic eye exam within the last year: Yes    Breakfast eaten regularly: once in awhile     Patient counting carbs: Yes       HPI:  Marly's here today for the follow up regarding her Diabetes mellitus, Type 2.    Lab Results   Component Value Date    A1C 11.0 05/29/2019    A1C 9.8 02/22/2019    A1C 10.9 11/21/2018    A1C 9.6 08/21/2018    A1C 9.6 08/21/2018     Current Diabetes medication:   1.  Tresiba 80 units daily (supper time)--reports no missed doses  2.  Trulicity 1.5 mg weekly--reports no missed doses  3.  Novolog per sliding, 10 units; 15 units >300--reports no missed doses  ASA use: yes, 81 mg daily  Statin use: yes, simvastatin 20 mg daily    Marly's here today for her 2nd CGM study evaluation and possible insulin ajdust.  CGM fell off.    Denies any missed doses of insulin.  Denies any missed doses of Trulicity     Marly has been having sweating spells and left knee pain.  Amberly Whyte NP has been working up both issues.     Marly has an  this Friday.        Patient Active Problem List   Diagnosis     Type 2 diabetes, HbA1c goal < 7% (H)     ACP (advance care planning)     Stage 3 chronic kidney disease (H)     Pulmonary emphysema (H)     Hyperparathyroidism due to renal insufficiency (H)     Morbid obesity (H)     Vitamin D deficiency     Intellectual disability     Breast fibrocystic disorder     Tobacco abuse     Microalbuminuria due to type 2 diabetes mellitus (H)      H/O colonoscopy     [place] cardiac risk <10% in next 10 years     Hypomagnesemia     Tubular adenoma of colon     Hyperlipidemia, unspecified hyperlipidemia type     Essential hypertension     Callus of foot       No past medical history on file.    Past Surgical History:   Procedure Laterality Date     COLONOSCOPY N/A 8/20/2015    Procedure: COLONOSCOPY;  Surgeon: Gentry Porter MD;  Location: [facility]     COLONOSCOPY N/A 9/21/2018    Procedure: COLONOSCOPY;  COLONOSCOPY WITH POLYPECTOMY;  Surgeon: Gentry Porter MD;  Location: [facility]       Family History   Problem Relation Age of Onset     Diabetes Mother      Diabetes Father      Hypertension Brother      Diabetes Sister        Social History     Tobacco Use     Smoking status: Current Every Day Smoker     Packs/day: 1.00     Years: 34.00     Pack years: 34.00     Types: Cigarettes     Start date: 1/1/1979     Smokeless tobacco: Never Used     Tobacco comment: Declined QP 04/24/19   Substance Use Topics     Alcohol use: No     Alcohol/week: 0.0 oz       Current Outpatient Medications   Medication Sig Dispense Refill     Acetaminophen (TYLENOL PO) Take 325 mg by mouth every 6 hours as needed        ammonium lactate (LAC-HYDRIN) 12 % cream Apply topically 2 times daily       aspirin 81 MG EC tablet Take 1 tablet (81 mg) by mouth daily 90 tablet 3     atorvastatin (LIPITOR) 40 MG tablet Take 1 tablet (40 mg) by mouth daily 90 tablet 3     blood glucose (NO BRAND SPECIFIED) test strip Use to test blood sugar 4 times daily or as directed. 300 strip 11     Cholecalciferol (VITAMIN D-3 PO) Take 2,000 Units by mouth daily       Cyclobenzaprine HCl (FLEXERIL PO) Take 10 mg by mouth 3 times daily as needed for muscle spasms       dulaglutide (TRULICITY) 1.5 MG/0.5ML pen Inject 1.5 mg Subcutaneous every 7 days 2 mL 3     HYDROCHLOROTHIAZIDE PO Take 12.5 mg by mouth daily        hydrocortisone 2.5 % cream Apply topically 2 times daily       insulin aspart  "(NOVOLOG PEN) 100 UNIT/ML pen Inject 5 units if BG is greater than 200; inject 10 units if BG is greater than 300 before meals.  TDD: 30 units 15 mL 3     insulin degludec (TRESIBA FLEXTOUCH) 200 UNIT/ML pen Inject 84 Units Subcutaneous daily 18 mL 3     insulin pen needle (32G X 4 MM) 32G X 4 MM miscellaneous Use 1 pen needles daily 100 each 3     Ipratropium-Albuterol (COMBIVENT RESPIMAT)  MCG/ACT inhaler Inhale 1 puff into the lungs 4 times daily       LANTUS SOLOSTAR 100 UNIT/ML soln        lisinopril (PRINIVIL/ZESTRIL) 40 MG tablet 40 mg daily       ONETOUCH DELICA LANCETS 33G MISC 1 each 3 times daily 100 each 10     PRIMIDONE PO Take by mouth At Bedtime       SIMVASTATIN PO Take 20 mg by mouth At Bedtime        triamcinolone (KENALOG) 0.1 % cream Apply topically 2 times daily         No Known Allergies    REVIEW OF SYSTEMS  Skin: negative  Eyes: negative  Ears/Nose/Throat: negative  Respiratory: No shortness of breath, cough, or hemoptysis; sometimes SOB with walking long distance--using inhaler  Cardiovascular: negative  Gastrointestinal: negative  Genitourinary: negative  Musculoskeletal: negative  Neurologic: negative; history of headaches  Psychiatric: negative  Hematologic/Lymphatic/Immunologic: negative  Endocrine: positive for diabetes; sweaty spells     OBJECTIVE:  /70 (BP Location: Left arm, Patient Position: Chair, Cuff Size: Adult Regular)   Pulse 91   Ht 1.626 m (5' 4\")   Wt 98 kg (216 lb)   SpO2 96%   BMI 37.08 kg/m    Constitutional: alert and no distress  Cardiovascular: RRR. Murmur noted.  No clicks gallops or rub  Respiratory:  Good diaphragmatic excursion. Lungs clear  Psychiatric: mentation appears normal for baseline and affect normal/bright    LABS  Results for orders placed or performed in visit on 07/09/19   XR Knee Left 3 Views    Narrative    PROCEDURE: XR KNEE LT 3 VW 7/9/2019 8:35 AM    HISTORY: Acute pain of left knee    COMPARISONS: None.    TECHNIQUE: 3 " views.    FINDINGS: No acute fracture or dislocation is seen. Joint spaces are  fairly well preserved. There are very small posterior patellar  osteophytes. No joint effusion is seen.         Impression    IMPRESSION: Very mild degenerative change.    CHYNA SCHREIBER MD       ASSESSMENT / PLAN:  (E11.65,  Z79.4) Type 2 diabetes mellitus with hyperglycemia, with long-term current use of insulin (H)  Comment:   Bg pattern written down on Blood glucose monitoring doesn't match her meter    Several times, BG pattern goes up by 1 point during the day, for several days    Plan: insulin degludec (TRESIBA FLEXTOUCH) 200         UNIT/ML pen, GLUCOSE MONITORING CONTINUOUS,         >=72 HR          Will do another CGM study after her US.   Will utilize skin glue    Will verify if her BG pattern matches the CGM pattern    Increase Tresiba to 84 units as AM BGs are still above target    (Z72.0) Tobacco abuse  Comment: noted and refused  Plan: Tobacco Cessation - Order to Satisfy Health         Maintenance          Marly's aware to let us know when she's ready to quit smoking.  Discussed the dangers of smoking with diabetes      (Z71.6) Tobacco abuse counseling  Comment: noted  Plan: Tobacco Cessation - Order to Satisfy Health         Maintenance          As mentioned above    Patient Instructions   Continue working on healthy eating and moving (start low and slow, work up to 30 min, 5x/week)    BG goals:  Fasting and before meals <130, >70  2 hour after eating <180    We only need 1/2 of these numbers to be within target then your A1c will be within target    Diabetes Medications    1.  Trulicity  Trulicity 1.5 mg every Friday    2.  Novolog before meals  If you don't take your BG or BG >70 before meals: 10 units  If BG is greater than 300: 15 units     3.  Tresiba  84 units daily at supper    If you are sweaty, dizzy, shaky or lightheaded, please check a BG before injecting insulin    If you are interested in the Bud  continuous glucose monitor (never have to poke your finger to check your BG again), start checking your BG 4 times a day    Follow up   One week   Come after your ultrasound to put on another CGM study    Call me sooner if any problems/concerns and/or questions develop including consistent low BGs <70 or consistent high BGs >200  [phone] (Unit Coordinator)    [phone] (Nurse)         HOW TO QUIT SMOKING  Smoking is one of the hardest habits to break. About half of all those who have ever smoked have been able to quit, and most of those (about 70%) who still smoke want to quit. Here are some of the best ways to stop smoking.     KEEP TRYING:  It takes most smokers about 8 tries before they are finally able to fully quit. So, the more often you try and fail, the better your chance of quitting the next time! So, don't give up!    GO COLD TURKEY:  Most ex-smokers quit cold turkey. Trying to cut back gradually doesn't seem to work as well, perhaps because it continues the smoking habit. Also, it is possible to fool yourself by inhaling more while smoking fewer cigarettes. This results in the same amount of nicotine in your body!    GET SUPPORT:  Support programs can make an important difference, especially for the heavy smoker. These groups offer lectures, methods to change your behavior and peer support. Call the free national Quitline for more information. 800-QUIT-NOW (559-658-0414). Low-cost or free programs are offered by many hospitals, local chapters of the American Lung Association (202-794-9400) and the American Cancer Society (465-925-7750). Support at home is important too. Non-smokers can help by offering praise and encouragement. If the smoker fails to quit, encourage them to try again!    OVER-THE-COUNTER MEDICINES:  For those who can't quit on their own, Nicotine Replacement Therapy (NRT) may make quitting much easier. Certain aids such as the nicotine patch, gum and lozenge are available without  a prescription. However, it is best to use these under the guidance of your doctor. The skin patch provides a steady supply of nicotine to the body. Nicotine gum and lozenge gives temporary bursts of low levels of nicotine. Both methods take the edge off the craving for cigarettes. WARNING: If you feel symptoms of nicotine overdose, such as nausea, vomiting, dizziness, weakness, or fast heartbeat, stop using these and see your doctor.    PRESCRIPTION MEDICINES:  After evaluating your smoking patterns and prior attempts at quitting, your doctor may offer a prescription medicine such as bupropion (Zyban, Wellbutrin), varenicline (Chantix, Champix), a niocotine inhaler or nasal spray. Each has its unique advantage and side effects which your doctor can review with you.    HEALTH BENEFITS OF QUITTING:  The benefits of quitting start right away and keep improving the longer you go without smokin minutes: blood pressure and pulse return to normal  8 hours: oxygen levels return to normal  2 days: ability to smell and taste begins to improve as damaged nerves start to regrow  2-3 weeks: circulation and lung function improves  1-9 months: decreased cough, congestion and shortness of breath; less tired  1 year: risk of heart attack decreases by half  5 years: risk of lung cancer decreases by half; risk of stroke becomes the same as a non-smoker  For information about how to quit smoking, visit the following links:  National Cancer [place] ,   Clearing the Air, Quit Smoking Today   - an online booklet. http://www.smokefree.gov/pubs/clearing_the_air.pdf  Smokefree.gov http://smokefree.gov/  QuitNet http://www.quitnet.com/    2349-9230 Elisa Munson, [address] [place] [address], [place] [address]. All rights reserved. This information is not intended as a substitute for professional medical care. Always follow your healthcare professional's instructions.    The Benefits of Living Smoke Free  What do you want to gain from  quitting? Check off some reasons to quit.  Health Benefits  ___ Reduce my risk of lung cancer, heart disease, chronic lung disease  ___ Have fewer wrinkles and softer skin  ___ Improve my sense of taste and smell  ___ For pregnant women--reduce the risk of having a miscarriage, stillbirth, premature birth, or low-birth-weight baby  Personal Benefits  ___ Feel more in control of my life  ___ Have better-smelling hair, breath, clothes, home, and car  ___ Save time by not having to take smoke breaks, buy cigarettes, or hunt for a light  ___ Have whiter teeth  Family Benefits  ___ Reduce my children s respiratory tract infections  ___ Set a good example for my children  ___ Reduce my family s cancer risk  Financial Benefits  ___ Save hundreds of dollars each year that would be spent on cigarettes  ___ Save money on medical bills  ___ Save on life, health, and car insurance premiums    Those Dollars Add Up!  Cigarettes are expensive, and getting more expensive all the time. Do you realize how much money you are spending on cigarettes per year? What is the average amount you spend on a pack of cigarettes? What is the average number of packs that you smoke per day? Using your answers to these questions, fill in this formula to help you find out:  ($ _____ per pack) ×  ( _____ number of packs per day) × (365 days) =  $ _____ yearly cost of smoking  Besides tobacco, there are other costs, including extra cleaning bills and replacement costs for clothing and furniture; medical expenses for smoking-related illnesses; and higher health, life, and car insurance premiums.    Cigars and Pipes Count Too!  Cigars and pipes are also dangerous. So are smokeless (chewing) tobacco and snuff. All of these products contain nicotine, a highly addictive substance that has harmful effects on your body. Quitting smoking means giving up all tobacco products.      5980-5104 Elisa Munson, [address] [facility], [place] [address]. All rights  reserved. This information is not intended as a substitute for professional medical care. Always follow your healthcare professional's instructions.    Time: 40 minutes  Barrier: see [facility]  Willingness to learn: accepting    Courtney PINTO [facility]  Disease Management    Cc: Amberly Whyte NP    With the electronic record, we can now more quickly and easily track our patient diabetic goals. Our diabetes clinical review is in progress and these are the indicators we are monitoring for good diabetes health.     1.) HbA1C less than 7 (measurement of your average blood sugars)  2.) Blood Pressure less than 140/80  3.) LDL less than 100 (bad cholesterol)  4.) HbA1C is checked in the last 6 months and below 7% (more frequently if not at goal or adjusting medications)  5.) LDL is checked in the last 12 months (more frequently if not at goal or adjusting medications)  6.) Taking one baby aspirin daily (unless otherwise instructed)  7.) No tobacco use  8) Statin use     You have achieved 6 out of 8 of these and I am encouraging you to come in and get tuned up to achieve 8 out of 8!  Here is what you have achieved so far in my goals for you:  1.) HbA1C  less than 7:                              NO  Your last  HbA1C :  Lab Results   Component Value Date    A1C 11.0 05/29/2019    A1C 9.8 02/22/2019    A1C 10.9 11/21/2018    A1C 9.6 08/21/2018    A1C 9.6 08/21/2018     2.) Blood Pressure less than 140/80:       YES      Your last    BP Readings from Last 1 Encounters:   07/11/19 120/70     3.) LDL less than 100:                              YES      Your last     LDL Cholesterol Calculated   Date Value Ref Range Status   02/22/2019 86 100 mg/dL Final       4.) Checked HbA1C in the past 6 months: YES      5.) Checked LDL in the past 12 months:    YES      6.) Taking one aspirin daily:                       YES     7.) No tobacco use:                                        NO   8.) Statin use      YES

## 2019-07-12 ENCOUNTER — HOSPITAL ENCOUNTER (OUTPATIENT)
Dept: ULTRASOUND IMAGING | Facility: HOSPITAL | Age: 54
Discharge: HOME | End: 2019-07-12
Attending: NURSE PRACTITIONER | Admitting: NURSE PRACTITIONER
Payer: MEDICARE

## 2019-07-12 ENCOUNTER — ALLIED HEALTH/NURSE VISIT (OUTPATIENT)
Dept: EDUCATION SERVICES | Facility: OTHER | Age: 54
End: 2019-07-12
Attending: NURSE PRACTITIONER
Payer: MEDICARE

## 2019-07-12 DIAGNOSIS — R42 DIZZINESS: ICD-10-CM

## 2019-07-12 DIAGNOSIS — E11.65 TYPE 2 DIABETES MELLITUS WITH HYPERGLYCEMIA, WITH LONG-TERM CURRENT USE OF INSULIN (H): Primary | ICD-10-CM

## 2019-07-12 DIAGNOSIS — Z79.4 TYPE 2 DIABETES MELLITUS WITH HYPERGLYCEMIA, WITH LONG-TERM CURRENT USE OF INSULIN (H): Primary | ICD-10-CM

## 2019-07-12 PROCEDURE — 95250 CONT GLUC MNTR PHYS/QHP EQP: CPT

## 2019-07-12 PROCEDURE — 93880 EXTRACRANIAL BILAT STUDY: CPT | Mod: TC

## 2019-07-15 NOTE — PROGRESS NOTES
Sensor hook up.     Lot: [id number]  Serial: [id number]  Exp: 10/31/19    Courtney Chaudhary APRN FNP-BC  Diabetes and Wound Care

## 2019-07-16 ENCOUNTER — PATIENT OUTREACH (OUTPATIENT)
Dept: CARE COORDINATION | Facility: OTHER | Age: 54
End: 2019-07-16

## 2019-07-16 ENCOUNTER — TELEPHONE (OUTPATIENT)
Dept: NUCLEAR MEDICINE | Facility: HOSPITAL | Age: 54
End: 2019-07-16

## 2019-07-16 ASSESSMENT — ACTIVITIES OF DAILY LIVING (ADL): DEPENDENT_IADLS:: INDEPENDENT

## 2019-07-16 NOTE — PROGRESS NOTES
Clinic Care Coordination Contact  Care Team Conversations    Pt's home care RN Nikloe called this afternoon and reported Marly is 'doing great'.  She states she was compliant with her oral meds this past week.  Is reporting to her home care nurse that she is compliant with insulin.  The nurse did watch Marly give herself her Trulicity today.  All vitals were WNL.    She did note that she looked at her glucometer and last reading was 7/11, prior to that were readings from 7/6, 7/5, and 7/1.  The numbers DO NOT correlate with the paper log Marly is keeping so there continues to be non-compliance here but this is a known issue.  Will continue to monitor and educate to improve compliance.    **CC also noted pt has a stress test scheduled for tomorrow 7/17.  She also had a nephrology appt with [facility]- Dr Eason- but CC did reschedule this to August 28th at 11AM at [facility].  Pt has been advised of the conflict and change in appointment time.  Home care RN was advised as well.    Lauren Pipkin, BS-RN   CHF and General Care Coordinator  [phone] Option # 1

## 2019-07-16 NOTE — TELEPHONE ENCOUNTER
A reminder call was performed on 7/16/19 for the patients nuclear medicine stress test on 7/17/19. Patient was given the time, date, location and prep for the exam. Patient understood.

## 2019-07-17 ENCOUNTER — HOSPITAL ENCOUNTER (OUTPATIENT)
Dept: CARDIOLOGY | Facility: HOSPITAL | Age: 54
Setting detail: NUCLEAR MEDICINE
End: 2019-07-17
Attending: NURSE PRACTITIONER
Payer: MEDICARE

## 2019-07-17 ENCOUNTER — HOSPITAL ENCOUNTER (OUTPATIENT)
Dept: NUCLEAR MEDICINE | Facility: HOSPITAL | Age: 54
Setting detail: NUCLEAR MEDICINE
End: 2019-07-17
Attending: NURSE PRACTITIONER
Payer: MEDICARE

## 2019-07-17 ENCOUNTER — HOSPITAL ENCOUNTER (OUTPATIENT)
Dept: CARDIOLOGY | Facility: HOSPITAL | Age: 54
Discharge: HOME | End: 2019-07-17
Attending: NURSE PRACTITIONER | Admitting: NURSE PRACTITIONER
Payer: MEDICARE

## 2019-07-17 ENCOUNTER — HOSPITAL ENCOUNTER (OUTPATIENT)
Dept: NUCLEAR MEDICINE | Facility: HOSPITAL | Age: 54
Setting detail: NUCLEAR MEDICINE
Discharge: HOME | End: 2019-07-17
Attending: NURSE PRACTITIONER | Admitting: NURSE PRACTITIONER
Payer: MEDICARE

## 2019-07-17 DIAGNOSIS — R06.09 DYSPNEA ON EXERTION: ICD-10-CM

## 2019-07-17 DIAGNOSIS — R42 DIZZINESS: ICD-10-CM

## 2019-07-17 PROCEDURE — 34300033 ZZH RX 343: Performed by: RADIOLOGY

## 2019-07-17 PROCEDURE — 93017 CV STRESS TEST TRACING ONLY: CPT | Performed by: INTERNAL MEDICINE

## 2019-07-17 PROCEDURE — 78452 HT MUSCLE IMAGE SPECT MULT: CPT | Mod: TC

## 2019-07-17 PROCEDURE — 25000128 H RX IP 250 OP 636: Performed by: INTERNAL MEDICINE

## 2019-07-17 PROCEDURE — 93016 CV STRESS TEST SUPVJ ONLY: CPT | Performed by: INTERNAL MEDICINE

## 2019-07-17 PROCEDURE — 0298T ZIO PATCH HOLTER ADULT PEDIATRIC GREATER THAN 48 HRS: CPT | Performed by: INTERNAL MEDICINE

## 2019-07-17 PROCEDURE — 93018 CV STRESS TEST I&R ONLY: CPT | Performed by: INTERNAL MEDICINE

## 2019-07-17 PROCEDURE — A9500 TC99M SESTAMIBI: HCPCS | Performed by: RADIOLOGY

## 2019-07-17 PROCEDURE — 0296T ZIO PATCH HOLTER ADULT PEDIATRIC GREATER THAN 48 HRS: CPT | Mod: TC

## 2019-07-17 RX ORDER — AMINOPHYLLINE 25 MG/ML
INJECTION, SOLUTION INTRAVENOUS
Status: DISCONTINUED
Start: 2019-07-17 | End: 2019-07-17 | Stop reason: HOSPADM

## 2019-07-17 RX ORDER — REGADENOSON 0.08 MG/ML
0.4 INJECTION, SOLUTION INTRAVENOUS ONCE
Status: COMPLETED | OUTPATIENT
Start: 2019-07-17 | End: 2019-07-17

## 2019-07-17 RX ADMIN — REGADENOSON 0.4 MG: 0.08 INJECTION, SOLUTION INTRAVENOUS at 09:56

## 2019-07-17 RX ADMIN — Medication 30 MILLICURIE: at 09:56

## 2019-07-17 RX ADMIN — Medication 10 MILLICURIE: at 07:45

## 2019-07-17 NOTE — PROGRESS NOTES
"Subjective     Marly Cannon is a 53 year old female who presents to clinic today for the following health issues:    HPI   Hypertension/Sweating Spells Follow-up    Patient was seen on 7/9/18. At this time, she was having some dizziness, sweating spells, and shortness of breath. Her BP was 102/76. She had been taking lisinopril, hydrochlorothiazide, and amlodipine. Her amlodipine was stopped and she follows up today. She also had a stress test that was unremarkable.       Do you check your blood pressure regularly outside of the clinic? No     Are you following a low salt diet? Yes    Are your blood pressures ever more than 140 on the top number (systolic) OR more   than 90 on the bottom number (diastolic), for example 140/90? N/A-not checking BP    Amount of exercise or physical activity: 2-3 days/week for an average of 15-30 minutes    Problems taking medications regularly: No    Medication side effects: none    Diet: regular (no restrictions)  -She does have type 2 DM and has been [facility] with [facility]. She was noncompliant with her insulin, however, home care recently got involved to help with medication compliance. Recent A1C was 11.   -She also has COPD, but feels it is well controlled. She tells me that she is using her Combivent 2-3 times daily. Does not feel short of breath when having her sweating spells, but does get short of breath when walking outside in hot humid weather. Continues to smoke 2 ppd. No interest in quitting.   -She also has left knee pain. At her previous appointment, she felt the sweating spells were occurring when her pain was bad. She notes that with ice and tylenol, her pain has improved.     She is having less \"sweating\" spells and dizziness. She does have some intellectual disabilities and has trouble articulating how she feels. She notes that she has had about 2 since our last visit. Prior to that, was having them daily. As noted above, stress test was negative. She also " had a carotid US that showed no significant stenosis. She notes that she still has not been checking her glucose when she feels poor. She denies chest pain, but complains of dyspnea when walking outside. No fevers. No abdominal pain. No nausea. No vomiting. No rashes.    Patient Active Problem List   Diagnosis     Type 2 diabetes, HbA1c goal < 7% (H)     ACP (advance care planning)     Stage 3 chronic kidney disease (H)     Pulmonary emphysema (H)     Hyperparathyroidism due to renal insufficiency (H)     Morbid obesity (H)     Vitamin D deficiency     Intellectual disability     Breast fibrocystic disorder     Tobacco abuse     Microalbuminuria due to type 2 diabetes mellitus (H)     H/O colonoscopy     [place] cardiac risk <10% in next 10 years     Hypomagnesemia     Tubular adenoma of colon     Hyperlipidemia, unspecified hyperlipidemia type     Essential hypertension     Callus of foot     Past Surgical History:   Procedure Laterality Date     COLONOSCOPY N/A 8/20/2015    Procedure: COLONOSCOPY;  Surgeon: Gentry Potrer MD;  Location: [facility] OR     COLONOSCOPY N/A 9/21/2018    Procedure: COLONOSCOPY;  COLONOSCOPY WITH POLYPECTOMY;  Surgeon: Gentry Porter MD;  Location: [facility] OR       Social History     Tobacco Use     Smoking status: Current Every Day Smoker     Packs/day: 1.00     Years: 34.00     Pack years: 34.00     Types: Cigarettes     Start date: 1/1/1979     Smokeless tobacco: Never Used     Tobacco comment: Declined QP 04/24/19   Substance Use Topics     Alcohol use: No     Alcohol/week: 0.0 oz     Family History   Problem Relation Age of Onset     Diabetes Mother      Diabetes Father      Hypertension Brother      Diabetes Sister          Current Outpatient Medications   Medication Sig Dispense Refill     Acetaminophen (TYLENOL PO) Take 325 mg by mouth every 6 hours as needed        ammonium lactate (LAC-HYDRIN) 12 % cream Apply topically 2 times daily       aspirin 81 MG EC tablet Take 1  "tablet (81 mg) by mouth daily 90 tablet 3     atorvastatin (LIPITOR) 40 MG tablet Take 1 tablet (40 mg) by mouth daily 90 tablet 3     blood glucose (NO BRAND SPECIFIED) test strip Use to test blood sugar 4 times daily or as directed. 300 strip 11     Cholecalciferol (VITAMIN D-3 PO) Take 2,000 Units by mouth daily       Cyclobenzaprine HCl (FLEXERIL PO) Take 10 mg by mouth 3 times daily as needed for muscle spasms       dulaglutide (TRULICITY) 1.5 MG/0.5ML pen Inject 1.5 mg Subcutaneous every 7 days 2 mL 3     hydrocortisone 2.5 % cream Apply topically 2 times daily       insulin aspart (NOVOLOG PEN) 100 UNIT/ML pen Inject 5 units if BG is greater than 200; inject 10 units if BG is greater than 300 before meals.  TDD: 30 units 15 mL 3     insulin degludec (TRESIBA FLEXTOUCH) 200 UNIT/ML pen Inject 84 Units Subcutaneous daily 18 mL 3     insulin pen needle (32G X 4 MM) 32G X 4 MM miscellaneous Use 1 pen needles daily 100 each 3     Ipratropium-Albuterol (COMBIVENT RESPIMAT)  MCG/ACT inhaler Inhale 1 puff into the lungs 4 times daily       LANTUS SOLOSTAR 100 UNIT/ML soln        lisinopril (PRINIVIL/ZESTRIL) 40 MG tablet 40 mg daily       mometasone-formoterol (DULERA) 100-5 MCG/ACT inhaler Inhale 2 puffs into the lungs 2 times daily 13 g 3     ONETOUCH DELICA LANCETS 33G MISC 1 each 3 times daily 100 each 10     PRIMIDONE PO Take by mouth At Bedtime       SIMVASTATIN PO Take 20 mg by mouth At Bedtime        triamcinolone (KENALOG) 0.1 % cream Apply topically 2 times daily       No Known Allergies      Reviewed and updated as needed this visit by Provider         Review of Systems   As noted in the HPI.       Objective    /68 (BP Location: Left arm, Patient Position: Chair, Cuff Size: Adult Large)   Pulse 86   Temp 97.7  F (36.5  C) (Tympanic)   Ht 1.626 m (5' 4\")   Wt 97.5 kg (215 lb)   SpO2 92%   BMI 36.90 kg/m    Body mass index is 36.9 kg/m .  Physical Exam   GENERAL: alert, no distress and " obese  EYES: Eyes grossly normal to inspection, PERRL and conjunctivae and sclerae normal  HENT: ear canals and TM's normal, nose and mouth without ulcers or lesions  NECK: no adenopathy, no asymmetry, masses, or scars and thyroid normal to palpation  RESP: lungs clear to auscultation - no rales, rhonchi or wheezes  CV: regular rate and rhythm, normal S1 S2, no S3 or S4, no murmur  NEURO: Normal strength and tone, mentation intact and speech normal  PSYCH: mentation appears normal, affect normal/bright  LOWER LEGS-1+ edema bilaterally, no pain in calves  LEFT KNEE: Skin intact. No erythema or edema. No effusion. No instability. Negative Lachmans. Some pain with flexion and extension.     Diagnostic Test Results:  none         Assessment & Plan   (M25.562) Acute pain of left knee  (primary encounter diagnosis)  Comment: improving with tylenol and ice  Plan: Will continue. If pain worsens, will complete MRI.    (R42) Dizziness  (E11.29,  R80.9,  Z79.4) Type 2 diabetes mellitus with microalbuminuria, with long-term current use of insulin (H)  (I10) Essential hypertension  (R06.09) Dyspnea on exertion  (J43.9) Pulmonary emphysema, unspecified emphysema type (H)  Plan: I believe her dizziness is multifactorial. Her BP is on the low side. Will therefore stop her hydrochlorothiazide and have her f/u 2 weeks. Her oxygen sats are also on the low side at 92 percent. She does have COPD and tells me that she has only been using her combivent 2-3 times daily. She will increase this to 4 times daily and also begin Dulera twice daily. Will reassess oxygen in 2 weeks. Difficult as patient is often noncomplaint with plan. Will update care coordination to help with these medication adjustments. It is reassuring that her carotid US and stress test was negative. Dizziness also could be from uncontrolled DM. Working with the DM center to get better control.          Tobacco Cessation:   reports that she has been smoking cigarettes.  She  "started smoking about 40 years ago. She has a 34.00 pack-year smoking history. She has never used smokeless tobacco.  Tobacco Cessation Action Plan: Information offered: Patient not interested at this time      BMI:   Estimated body mass index is 36.9 kg/m  as calculated from the following:    Height as of this encounter: 1.626 m (5' 4\").    Weight as of this encounter: 97.5 kg (215 lb).   Weight management plan: Discussed healthy diet and exercise guidelines        Return in about 2 weeks (around 8/6/2019).    Amberly Whyte NP  [facility] [place]      "

## 2019-07-18 ENCOUNTER — ALLIED HEALTH/NURSE VISIT (OUTPATIENT)
Dept: EDUCATION SERVICES | Facility: OTHER | Age: 54
End: 2019-07-18
Attending: NURSE PRACTITIONER
Payer: MEDICARE

## 2019-07-18 ENCOUNTER — PATIENT OUTREACH (OUTPATIENT)
Dept: CARE COORDINATION | Facility: OTHER | Age: 54
End: 2019-07-18

## 2019-07-18 VITALS
WEIGHT: 212.3 LBS | SYSTOLIC BLOOD PRESSURE: 111 MMHG | HEART RATE: 98 BPM | OXYGEN SATURATION: 91 % | BODY MASS INDEX: 36.25 KG/M2 | RESPIRATION RATE: 16 BRPM | DIASTOLIC BLOOD PRESSURE: 78 MMHG | HEIGHT: 64 IN

## 2019-07-18 DIAGNOSIS — E11.65 TYPE 2 DIABETES MELLITUS WITH HYPERGLYCEMIA, WITH LONG-TERM CURRENT USE OF INSULIN (H): ICD-10-CM

## 2019-07-18 DIAGNOSIS — Z72.0 TOBACCO ABUSE: ICD-10-CM

## 2019-07-18 DIAGNOSIS — Z71.6 TOBACCO ABUSE COUNSELING: ICD-10-CM

## 2019-07-18 DIAGNOSIS — Z79.4 TYPE 2 DIABETES MELLITUS WITH HYPERGLYCEMIA, WITH LONG-TERM CURRENT USE OF INSULIN (H): ICD-10-CM

## 2019-07-18 PROCEDURE — 99213 OFFICE O/P EST LOW 20 MIN: CPT | Performed by: NURSE PRACTITIONER

## 2019-07-18 PROCEDURE — G0463 HOSPITAL OUTPT CLINIC VISIT: HCPCS

## 2019-07-18 ASSESSMENT — PAIN SCALES - GENERAL: PAINLEVEL: NO PAIN (0)

## 2019-07-18 ASSESSMENT — MIFFLIN-ST. JEOR: SCORE: 1542.99

## 2019-07-18 ASSESSMENT — ACTIVITIES OF DAILY LIVING (ADL): DEPENDENT_IADLS:: INDEPENDENT

## 2019-07-18 NOTE — PROGRESS NOTES
"SUBJECTIVE:  Marly Cannon, 53 year old, female presents with the following Chief Complaint(s) with HPI to follow:  Chief Complaint   Patient presents with     Diabetes        Diabetes Follow-up    Patient is checking blood sugars: 0.3x/day.  Results:  Highest: 377  Lowest: 281  Period average: 345    Values above, >180: 4  Values within goal (): 0  Values below goal, <70: 0        Symptoms of hypoglycemia (low blood sugar): none    Paresthesias (numbness or burning in feet) or sores: No    Diabetic eye exam within the last year: Yes    Breakfast eaten regularly: once in awhile     Patient counting carbs: Yes    Walking: almost every day when it's nice       HPI:  Marly's here today for the follow up regarding her Diabetes mellitus, Type 2.    Lab Results   Component Value Date    A1C 11.0 05/29/2019    A1C 9.8 02/22/2019    A1C 10.9 11/21/2018    A1C 9.6 08/21/2018    A1C 9.6 08/21/2018     Current Diabetes medication:   1.  Tresiba 84  units daily (supper time)--reports no missed doses  2.  Trulicity 1.5 mg weekly--reports no missed doses  3.  Novolog per sliding, 10 units; 15 units >300--reports no missed doses  ASA use: yes, 81 mg daily  Statin use: yes, simvastatin 20 mg daily    Marly's here today for her 1st CGM study evaluation and possible insulin ajdust.  CGM fell off after 2 days even with the skin glue and tegaderm.    Denies any missed doses of both insulins.  Denies any missed doses of Trulicity     Marly reports knee feels better with icing.  She's been walking \"every time it's nice\".    Still having sweating spells, but no pattern and none recently      Marly has an US carotid last Friday: no issues with stenosis.      Stress test: normal; EF 90%  Holter monitor still in progress    Decision made to start another CGM study    Josie KABA, RN Care Coordinator in today's appt      Patient Active Problem List   Diagnosis     Type 2 diabetes, HbA1c goal < 7% (H)     ACP (advance care " planning)     Stage 3 chronic kidney disease (H)     Pulmonary emphysema (H)     Hyperparathyroidism due to renal insufficiency (H)     Morbid obesity (H)     Vitamin D deficiency     Intellectual disability     Breast fibrocystic disorder     Tobacco abuse     Microalbuminuria due to type 2 diabetes mellitus (H)     H/O colonoscopy     [place] cardiac risk <10% in next 10 years     Hypomagnesemia     Tubular adenoma of colon     Hyperlipidemia, unspecified hyperlipidemia type     Essential hypertension     Callus of foot       No past medical history on file.    Past Surgical History:   Procedure Laterality Date     COLONOSCOPY N/A 8/20/2015    Procedure: COLONOSCOPY;  Surgeon: Gentry Porter MD;  Location: [facility]     COLONOSCOPY N/A 9/21/2018    Procedure: COLONOSCOPY;  COLONOSCOPY WITH POLYPECTOMY;  Surgeon: Gentry Porter MD;  Location: [facility]       Family History   Problem Relation Age of Onset     Diabetes Mother      Diabetes Father      Hypertension Brother      Diabetes Sister        Social History     Tobacco Use     Smoking status: Current Every Day Smoker     Packs/day: 1.00     Years: 34.00     Pack years: 34.00     Types: Cigarettes     Start date: 1/1/1979     Smokeless tobacco: Never Used     Tobacco comment: Declined QP 04/24/19   Substance Use Topics     Alcohol use: No     Alcohol/week: 0.0 oz       Current Outpatient Medications   Medication Sig Dispense Refill     Acetaminophen (TYLENOL PO) Take 325 mg by mouth every 6 hours as needed        ammonium lactate (LAC-HYDRIN) 12 % cream Apply topically 2 times daily       aspirin 81 MG EC tablet Take 1 tablet (81 mg) by mouth daily 90 tablet 3     atorvastatin (LIPITOR) 40 MG tablet Take 1 tablet (40 mg) by mouth daily 90 tablet 3     blood glucose (NO BRAND SPECIFIED) test strip Use to test blood sugar 4 times daily or as directed. 300 strip 11     Cholecalciferol (VITAMIN D-3 PO) Take 2,000 Units by mouth daily       Cyclobenzaprine  "HCl (FLEXERIL PO) Take 10 mg by mouth 3 times daily as needed for muscle spasms       dulaglutide (TRULICITY) 1.5 MG/0.5ML pen Inject 1.5 mg Subcutaneous every 7 days 2 mL 3     HYDROCHLOROTHIAZIDE PO Take 12.5 mg by mouth daily        hydrocortisone 2.5 % cream Apply topically 2 times daily       insulin aspart (NOVOLOG PEN) 100 UNIT/ML pen Inject 5 units if BG is greater than 200; inject 10 units if BG is greater than 300 before meals.  TDD: 30 units 15 mL 3     insulin degludec (TRESIBA FLEXTOUCH) 200 UNIT/ML pen Inject 84 Units Subcutaneous daily 18 mL 3     insulin pen needle (32G X 4 MM) 32G X 4 MM miscellaneous Use 1 pen needles daily 100 each 3     Ipratropium-Albuterol (COMBIVENT RESPIMAT)  MCG/ACT inhaler Inhale 1 puff into the lungs 4 times daily       lisinopril (PRINIVIL/ZESTRIL) 40 MG tablet 40 mg daily       ONETOUCH DELICA LANCETS 33G MISC 1 each 3 times daily 100 each 10     PRIMIDONE PO Take by mouth At Bedtime       SIMVASTATIN PO Take 20 mg by mouth At Bedtime        triamcinolone (KENALOG) 0.1 % cream Apply topically 2 times daily       LANTUS SOLOSTAR 100 UNIT/ML soln          No Known Allergies    REVIEW OF SYSTEMS  Skin: negative  Eyes: negative  Ears/Nose/Throat: negative  Respiratory: No shortness of breath, cough, or hemoptysis; sometimes SOB with walking long distance--using inhaler  Cardiovascular: negative  Gastrointestinal: negative  Genitourinary: negative  Musculoskeletal: left knee discomfort--ice helping   Neurologic: negative; history of headaches  Psychiatric: negative  Hematologic/Lymphatic/Immunologic: continued  Endocrine: positive for diabetes; sweaty spells     OBJECTIVE:  /78   Pulse 98   Resp 16   Ht 1.626 m (5' 4\")   Wt 96.3 kg (212 lb 4.8 oz)   SpO2 91%   BMI 36.44 kg/m    Constitutional: alert and no distress  Skin: patient reports injecting in stomach; noted the following: no obviously skin injection site marks, no scar tissue, skin's " WDL  Psychiatric: mentation appears normal for baseline and affect normal/bright    LABS  Results for orders placed or performed during the hospital encounter of 07/17/19   NM Lexiscan stress test    Narrative    NM MPI WITH LEXISCAN    HISTORY:53 years Female,ACS (< 3mo ago), symptomatic, no angio;  Dyspnea on exertion    COMPARISON: None.    TECHNIQUE: Gated SPECT with wall motion and ejection fraction  calculation. Stress was performed via Lexiscan pharmacologic  technique. Please see the accompanying internal medicine report for  additional details.  DOSE (Stress/Rest): 30 mCi/10 mCi Tc-99m Sestamibi ?    FINDINGS: There is good uptake of activity by the left ventricle. The  left ventricular size is normal, with an EDV of 39 ml.    There are no areas of reversible myocardial perfusion deficit to  suggest ischemia. There are no areas of irreversible perfusion deficit  to suggest completed infarct.    Myocardial motility is preserved. The ejection fraction is normal, at  90%.        Impression    IMPRESSION: No evidence of abnormal myocardial perfusion. Preserved  EDV and ejection fraction.    KANNAN GALVAN MD   Stress EKG Interpretation    Narrative    This is a Lexiscan Cardiolite study on patient Marly Cannon ordered  by Amberly Whyte for dyspnea on exertion.    The patient was placed upon the table using our protocol and given 0.4  mg of IV Lexiscan bolus. She was monitored continuously throughout the  study. Resting heart was 80 peyton to 108. Resting blood pressure 120/80  went to 110/70. She had no significant symptoms.    Review of the electrocardiogram shows no significant changes. There  were no arrhythmias.  Of note room air O2 sats were 88-90%.    ASSESSMENT: Lexiscan Cardiolite study which the patient had  appropriate heart rate and blood pressure responses no symptoms no  acute EKG changes. The Cardiolite scans are pending.    PURVI SNOW MD       ASSESSMENT / PLAN:  (E11.65,  Z79.4) Type 2  diabetes mellitus with hyperglycemia, with long-term current use of insulin (H)  Comment:   CGM fell off <72 hours of data, but this what was recorded:    Findings:  Ave. S    Date:     Ave. Glucose: 227   Time in target: 0%  Time below target: 0%  Time above target: 100%    Date:     Ave. Glucose: 357   Time in target: 0%  Time below target: 0%  Time above target: 100%    Date:     Ave. Glucose: 322   Time in target: 0%  Time below target: 0%  Time above target: 100%    Distribution Data:  Percentage above 180: 100%  Percentage within : 0%  Percentage below 70: 0%    Hypoglycemia incidence:  none    Food choices/Carbohydrate counting:  Didn't keep diet    Exercise:  Depends on the weather    Recommendations:  See note    Plan:   New CGM placed on right lower back  Marly reports she tosses a lot in bed.    SN: [id number]  Expires: 2019    I continue to ask Marly if she's taking her insulin.  She reports yes, every time.  She couldn't tell me the dose, but in past appointment is able to dial up to the right dose.  Unable to visualize injection.     Will ask Home Care Nurse to stop by tomorrow (if able) to see how Marly injects her insulin.    With that being said, I did decrease her dose to 40 units.  I'm concerned that if we did the 84 units daily (as I don't believe she's taking her insulin), she would have continued issues with low BGs for >24 hours.      Follow up with CGM study next Tuesday when Marly sees her PCP, Amberly Whyte NP    (Z72.0) Tobacco abuse  Comment: noted and refused  Plan: Tobacco Cessation - Order to Satisfy Health         Maintenance          Marly's aware to let us know when she's ready to quit smoking.  Discussed the dangers of smoking with diabetes      (Z71.6) Tobacco abuse counseling  Comment: noted  Plan: Tobacco Cessation - Order to Satisfy Health         Maintenance          As mentioned above    Patient Instructions   Continue working on  healthy eating and moving (start low and slow, work up to 30 min, 5x/week)    BG goals:  Fasting and before meals <130, >70  2 hour after eating <180    We only need 1/2 of these numbers to be within target then your A1c will be within target    Diabetes Medications    1.  Trulicity  Trulicity 1.5 mg every Tuesday    2.  Novolog before meals  If you don't take your BG or BG >70 before meals: 10 units  If BG is greater than 300: 15 units     3.  Tresiba  Decrease to 40 units daily in the outer thigh    If you are sweaty, dizzy, shaky or lightheaded, please check a BG before injecting insulin    If you are interested in the Bud continuous glucose monitor (never have to poke your finger to check your BG again), start checking your BG 4 times a day    Follow up   On Tuesday    Call me sooner if any problems/concerns and/or questions develop including consistent low BGs <70 or consistent high BGs >200  [phone] (Unit Coordinator)    [phone] (Nurse)     - Test blood sugar 4 times a day: fasting, before meals, and before bedtime.   - No hot tub while wearing sensor. You can take a shower.  - Remove sensor if you need an xray, MRI or CT.   - Complete BG/Food log daily.     HOW TO QUIT SMOKING  Smoking is one of the hardest habits to break. About half of all those who have ever smoked have been able to quit, and most of those (about 70%) who still smoke want to quit. Here are some of the best ways to stop smoking.     KEEP TRYING:  It takes most smokers about 8 tries before they are finally able to fully quit. So, the more often you try and fail, the better your chance of quitting the next time! So, don't give up!    GO COLD TURKEY:  Most ex-smokers quit cold turkey. Trying to cut back gradually doesn't seem to work as well, perhaps because it continues the smoking habit. Also, it is possible to fool yourself by inhaling more while smoking fewer cigarettes. This results in the same amount of nicotine in your  body!    GET SUPPORT:  Support programs can make an important difference, especially for the heavy smoker. These groups offer lectures, methods to change your behavior and peer support. Call the free national Quitline for more information. 800-QUIT-NOW (571-095-4441). Low-cost or free programs are offered by many hospitals, local chapters of the American Lung Association (919-089-9505) and the American Cancer Society (535-279-5888). Support at home is important too. Non-smokers can help by offering praise and encouragement. If the smoker fails to quit, encourage them to try again!    OVER-THE-COUNTER MEDICINES:  For those who can't quit on their own, Nicotine Replacement Therapy (NRT) may make quitting much easier. Certain aids such as the nicotine patch, gum and lozenge are available without a prescription. However, it is best to use these under the guidance of your doctor. The skin patch provides a steady supply of nicotine to the body. Nicotine gum and lozenge gives temporary bursts of low levels of nicotine. Both methods take the edge off the craving for cigarettes. WARNING: If you feel symptoms of nicotine overdose, such as nausea, vomiting, dizziness, weakness, or fast heartbeat, stop using these and see your doctor.    PRESCRIPTION MEDICINES:  After evaluating your smoking patterns and prior attempts at quitting, your doctor may offer a prescription medicine such as bupropion (Zyban, Wellbutrin), varenicline (Chantix, Champix), a niocotine inhaler or nasal spray. Each has its unique advantage and side effects which your doctor can review with you.    HEALTH BENEFITS OF QUITTING:  The benefits of quitting start right away and keep improving the longer you go without smokin minutes: blood pressure and pulse return to normal  8 hours: oxygen levels return to normal  2 days: ability to smell and taste begins to improve as damaged nerves start to regrow  2-3 weeks: circulation and lung function improves  1-9  months: decreased cough, congestion and shortness of breath; less tired  1 year: risk of heart attack decreases by half  5 years: risk of lung cancer decreases by half; risk of stroke becomes the same as a non-smoker  For information about how to quit smoking, visit the following links:  National Cancer [place] ,   Clearing the Air, Quit Smoking Today   - an online booklet. http://www.smokefree.gov/pubs/clearing_the_air.pdf  Smokefree.gov http://smokefree.gov/  QuitNet http://www.quitnet.com/    7119-2148 Elisa Munson, [address] [place] [address], [place] [address]. All rights reserved. This information is not intended as a substitute for professional medical care. Always follow your healthcare professional's instructions.    The Benefits of Living Smoke Free  What do you want to gain from quitting? Check off some reasons to quit.  Health Benefits  ___ Reduce my risk of lung cancer, heart disease, chronic lung disease  ___ Have fewer wrinkles and softer skin  ___ Improve my sense of taste and smell  ___ For pregnant women--reduce the risk of having a miscarriage, stillbirth, premature birth, or low-birth-weight baby  Personal Benefits  ___ Feel more in control of my life  ___ Have better-smelling hair, breath, clothes, home, and car  ___ Save time by not having to take smoke breaks, buy cigarettes, or hunt for a light  ___ Have whiter teeth  Family Benefits  ___ Reduce my children s respiratory tract infections  ___ Set a good example for my children  ___ Reduce my family s cancer risk  Financial Benefits  ___ Save hundreds of dollars each year that would be spent on cigarettes  ___ Save money on medical bills  ___ Save on life, health, and car insurance premiums    Those Dollars Add Up!  Cigarettes are expensive, and getting more expensive all the time. Do you realize how much money you are spending on cigarettes per year? What is the average amount you spend on a pack of cigarettes? What is the average number of  packs that you smoke per day? Using your answers to these questions, fill in this formula to help you find out:  ($ _____ per pack) ×  ( _____ number of packs per day) × (365 days) =  $ _____ yearly cost of smoking  Besides tobacco, there are other costs, including extra cleaning bills and replacement costs for clothing and furniture; medical expenses for smoking-related illnesses; and higher health, life, and car insurance premiums.    Cigars and Pipes Count Too!  Cigars and pipes are also dangerous. So are smokeless (chewing) tobacco and snuff. All of these products contain nicotine, a highly addictive substance that has harmful effects on your body. Quitting smoking means giving up all tobacco products.      0480-6003 [facility], [address] [place] [address], [place] [address]. All rights reserved. This information is not intended as a substitute for professional medical care. Always follow your healthcare professional's instructions.    Time: 35 minutes  Barrier: see [facility]  Willingness to learn: accepting    Courtney PINTO [facility]  Disease Management    Cc: Amberly Whyte NP    With the electronic record, we can now more quickly and easily track our patient diabetic goals. Our diabetes clinical review is in progress and these are the indicators we are monitoring for good diabetes health.     1.) HbA1C less than 7 (measurement of your average blood sugars)  2.) Blood Pressure less than 140/80  3.) LDL less than 100 (bad cholesterol)  4.) HbA1C is checked in the last 6 months and below 7% (more frequently if not at goal or adjusting medications)  5.) LDL is checked in the last 12 months (more frequently if not at goal or adjusting medications)  6.) Taking one baby aspirin daily (unless otherwise instructed)  7.) No tobacco use  8) Statin use     You have achieved 6 out of 8 of these and I am encouraging you to come in and get tuned up to achieve 8 out of 8!  Here is what you have achieved so far in my goals for  you:  1.) HbA1C  less than 7:                              NO  Your last  HbA1C :  Lab Results   Component Value Date    A1C 11.0 05/29/2019    A1C 9.8 02/22/2019    A1C 10.9 11/21/2018    A1C 9.6 08/21/2018    A1C 9.6 08/21/2018     2.) Blood Pressure less than 140/80:       YES      Your last    BP Readings from Last 1 Encounters:   07/18/19 111/78     3.) LDL less than 100:                              YES      Your last     LDL Cholesterol Calculated   Date Value Ref Range Status   02/22/2019 86 100 mg/dL Final       4.) Checked HbA1C in the past 6 months: YES      5.) Checked LDL in the past 12 months:    YES      6.) Taking one aspirin daily:                       YES     7.) No tobacco use:                                        NO   8.) Statin use      YES

## 2019-07-18 NOTE — PROGRESS NOTES
Clinic Care Coordination Contact  Care Team Conversations    Care coordinator attended office visit with pt and Courtney Chaudhary NP this date.  Please refer to today's progress note for updates to patient's plan of care.    CC is requesting pt's home care RN Nikole add an additional visit tomorrow with pt (normally sees pt on Tuesdays) to watch her inject her Tresiba- 40 UNITS ONLY- not the 84 units listed on her med list since we suspect she may not be taking her insulin at all.  Pt had a CGM sensor placed today (her previous sensor fell off after 2 days).  We would like to see what is occurring when she takes her insulin and we can hopefully get excellent real-time data with her wearing this sensor.     Encouraged pt to call care coordinator with any questions/concerns/problems.  Verbalized understanding.    Lauren Pipkin, BS-RN   CHF and General Care Coordinator  [phone] Option # 1

## 2019-07-18 NOTE — PROGRESS NOTES
"Chief Complaint   Patient presents with     Diabetes       Initial Pulse 98   Resp 16   Wt 96.3 kg (212 lb 4.8 oz)   SpO2 91%   BMI 36.44 kg/m   Estimated body mass index is 36.44 kg/m  as calculated from the following:    Height as of 7/11/19: 1.626 m (5' 4\").    Weight as of this encounter: 96.3 kg (212 lb 4.8 oz).  Medication Reconciliation: complete   Gege Walter      "

## 2019-07-18 NOTE — PATIENT INSTRUCTIONS
Continue working on healthy eating and moving (start low and slow, work up to 30 min, 5x/week)    BG goals:  Fasting and before meals <130, >70  2 hour after eating <180    We only need 1/2 of these numbers to be within target then your A1c will be within target    Diabetes Medications    1.  Trulicity  Trulicity 1.5 mg every Tuesday    2.  Novolog before meals  If you don't take your BG or BG >70 before meals: 10 units  If BG is greater than 300: 15 units     3.  Tresiba  Decrease to 40 units daily in the outer thigh    If you are sweaty, dizzy, shaky or lightheaded, please check a BG before injecting insulin    If you are interested in the Bud continuous glucose monitor (never have to poke your finger to check your BG again), start checking your BG 4 times a day    Follow up   On Tuesday    Call me sooner if any problems/concerns and/or questions develop including consistent low BGs <70 or consistent high BGs >200  [phone] (Unit Coordinator)    [phone] (Nurse)     - Test blood sugar 4 times a day: fasting, before meals, and before bedtime.   - No hot tub while wearing sensor. You can take a shower.  - Remove sensor if you need an xray, MRI or CT.   - Complete BG/Food log daily.     HOW TO QUIT SMOKING  Smoking is one of the hardest habits to break. About half of all those who have ever smoked have been able to quit, and most of those (about 70%) who still smoke want to quit. Here are some of the best ways to stop smoking.     KEEP TRYING:  It takes most smokers about 8 tries before they are finally able to fully quit. So, the more often you try and fail, the better your chance of quitting the next time! So, don't give up!    GO COLD TURKEY:  Most ex-smokers quit cold turkey. Trying to cut back gradually doesn't seem to work as well, perhaps because it continues the smoking habit. Also, it is possible to fool yourself by inhaling more while smoking fewer cigarettes. This results in the same amount of  nicotine in your body!    GET SUPPORT:  Support programs can make an important difference, especially for the heavy smoker. These groups offer lectures, methods to change your behavior and peer support. Call the free national Quitline for more information. 800-QUIT-NOW (388-962-5127). Low-cost or free programs are offered by many hospitals, local chapters of the American Lung Association (633-169-9546) and the American Cancer Society (279-026-0163). Support at home is important too. Non-smokers can help by offering praise and encouragement. If the smoker fails to quit, encourage them to try again!    OVER-THE-COUNTER MEDICINES:  For those who can't quit on their own, Nicotine Replacement Therapy (NRT) may make quitting much easier. Certain aids such as the nicotine patch, gum and lozenge are available without a prescription. However, it is best to use these under the guidance of your doctor. The skin patch provides a steady supply of nicotine to the body. Nicotine gum and lozenge gives temporary bursts of low levels of nicotine. Both methods take the edge off the craving for cigarettes. WARNING: If you feel symptoms of nicotine overdose, such as nausea, vomiting, dizziness, weakness, or fast heartbeat, stop using these and see your doctor.    PRESCRIPTION MEDICINES:  After evaluating your smoking patterns and prior attempts at quitting, your doctor may offer a prescription medicine such as bupropion (Zyban, Wellbutrin), varenicline (Chantix, Champix), a niocotine inhaler or nasal spray. Each has its unique advantage and side effects which your doctor can review with you.    HEALTH BENEFITS OF QUITTING:  The benefits of quitting start right away and keep improving the longer you go without smokin minutes: blood pressure and pulse return to normal  8 hours: oxygen levels return to normal  2 days: ability to smell and taste begins to improve as damaged nerves start to regrow  2-3 weeks: circulation and lung  function improves  1-9 months: decreased cough, congestion and shortness of breath; less tired  1 year: risk of heart attack decreases by half  5 years: risk of lung cancer decreases by half; risk of stroke becomes the same as a non-smoker  For information about how to quit smoking, visit the following links:  National Cancer [place] ,   Clearing the Air, Quit Smoking Today   - an online booklet. http://www.smokefree.gov/pubs/clearing_the_air.pdf  Smokefree.gov http://smokefree.gov/  QuitNet http://www.quitnet.com/    5215-1499 Elisa Munson, [address] [place] [address], [place] [address]. All rights reserved. This information is not intended as a substitute for professional medical care. Always follow your healthcare professional's instructions.    The Benefits of Living Smoke Free  What do you want to gain from quitting? Check off some reasons to quit.  Health Benefits  ___ Reduce my risk of lung cancer, heart disease, chronic lung disease  ___ Have fewer wrinkles and softer skin  ___ Improve my sense of taste and smell  ___ For pregnant women--reduce the risk of having a miscarriage, stillbirth, premature birth, or low-birth-weight baby  Personal Benefits  ___ Feel more in control of my life  ___ Have better-smelling hair, breath, clothes, home, and car  ___ Save time by not having to take smoke breaks, buy cigarettes, or hunt for a light  ___ Have whiter teeth  Family Benefits  ___ Reduce my children s respiratory tract infections  ___ Set a good example for my children  ___ Reduce my family s cancer risk  Financial Benefits  ___ Save hundreds of dollars each year that would be spent on cigarettes  ___ Save money on medical bills  ___ Save on life, health, and car insurance premiums    Those Dollars Add Up!  Cigarettes are expensive, and getting more expensive all the time. Do you realize how much money you are spending on cigarettes per year? What is the average amount you spend on a pack of cigarettes? What is  the average number of packs that you smoke per day? Using your answers to these questions, fill in this formula to help you find out:  ($ _____ per pack) ×  ( _____ number of packs per day) × (365 days) =  $ _____ yearly cost of smoking  Besides tobacco, there are other costs, including extra cleaning bills and replacement costs for clothing and furniture; medical expenses for smoking-related illnesses; and higher health, life, and car insurance premiums.    Cigars and Pipes Count Too!  Cigars and pipes are also dangerous. So are smokeless (chewing) tobacco and snuff. All of these products contain nicotine, a highly addictive substance that has harmful effects on your body. Quitting smoking means giving up all tobacco products.      7917-2535 [facility], [address] [place] [address], [place] [address]. All rights reserved. This information is not intended as a substitute for professional medical care. Always follow your healthcare professional's instructions.

## 2019-07-23 ENCOUNTER — PATIENT OUTREACH (OUTPATIENT)
Dept: CARE COORDINATION | Facility: OTHER | Age: 54
End: 2019-07-23

## 2019-07-23 ENCOUNTER — OFFICE VISIT (OUTPATIENT)
Dept: FAMILY MEDICINE | Facility: OTHER | Age: 54
End: 2019-07-23
Attending: NURSE PRACTITIONER
Payer: MEDICARE

## 2019-07-23 ENCOUNTER — TELEPHONE (OUTPATIENT)
Dept: EDUCATION SERVICES | Facility: OTHER | Age: 54
End: 2019-07-23

## 2019-07-23 ENCOUNTER — ALLIED HEALTH/NURSE VISIT (OUTPATIENT)
Dept: EDUCATION SERVICES | Facility: OTHER | Age: 54
End: 2019-07-23
Attending: NURSE PRACTITIONER
Payer: MEDICARE

## 2019-07-23 ENCOUNTER — TELEPHONE (OUTPATIENT)
Dept: FAMILY MEDICINE | Facility: OTHER | Age: 54
End: 2019-07-23

## 2019-07-23 VITALS
WEIGHT: 215 LBS | OXYGEN SATURATION: 92 % | DIASTOLIC BLOOD PRESSURE: 68 MMHG | TEMPERATURE: 97.7 F | SYSTOLIC BLOOD PRESSURE: 100 MMHG | HEART RATE: 86 BPM | HEIGHT: 64 IN | BODY MASS INDEX: 36.7 KG/M2

## 2019-07-23 DIAGNOSIS — R06.09 DYSPNEA ON EXERTION: ICD-10-CM

## 2019-07-23 DIAGNOSIS — Z79.4 TYPE 2 DIABETES MELLITUS WITH HYPERGLYCEMIA, WITH LONG-TERM CURRENT USE OF INSULIN (H): Primary | ICD-10-CM

## 2019-07-23 DIAGNOSIS — I10 ESSENTIAL HYPERTENSION: ICD-10-CM

## 2019-07-23 DIAGNOSIS — M25.562 ACUTE PAIN OF LEFT KNEE: Primary | ICD-10-CM

## 2019-07-23 DIAGNOSIS — R80.9 TYPE 2 DIABETES MELLITUS WITH MICROALBUMINURIA, WITH LONG-TERM CURRENT USE OF INSULIN (H): ICD-10-CM

## 2019-07-23 DIAGNOSIS — E11.65 TYPE 2 DIABETES MELLITUS WITH HYPERGLYCEMIA, WITH LONG-TERM CURRENT USE OF INSULIN (H): Primary | ICD-10-CM

## 2019-07-23 DIAGNOSIS — J43.9 PULMONARY EMPHYSEMA, UNSPECIFIED EMPHYSEMA TYPE (H): ICD-10-CM

## 2019-07-23 DIAGNOSIS — R42 DIZZINESS: ICD-10-CM

## 2019-07-23 DIAGNOSIS — Z79.4 TYPE 2 DIABETES MELLITUS WITH MICROALBUMINURIA, WITH LONG-TERM CURRENT USE OF INSULIN (H): ICD-10-CM

## 2019-07-23 DIAGNOSIS — E11.29 TYPE 2 DIABETES MELLITUS WITH MICROALBUMINURIA, WITH LONG-TERM CURRENT USE OF INSULIN (H): ICD-10-CM

## 2019-07-23 PROCEDURE — 99214 OFFICE O/P EST MOD 30 MIN: CPT | Performed by: NURSE PRACTITIONER

## 2019-07-23 PROCEDURE — G0463 HOSPITAL OUTPT CLINIC VISIT: HCPCS

## 2019-07-23 ASSESSMENT — PAIN SCALES - GENERAL: PAINLEVEL: NO PAIN (0)

## 2019-07-23 ASSESSMENT — MIFFLIN-ST. JEOR: SCORE: 1555.23

## 2019-07-23 ASSESSMENT — ACTIVITIES OF DAILY LIVING (ADL): DEPENDENT_IADLS:: INDEPENDENT

## 2019-07-23 NOTE — TELEPHONE ENCOUNTER
Pt was in for CGM dowload today. She does not have a Follow-up appt, or reading and removal appt, please advise what you would like.

## 2019-07-23 NOTE — PROGRESS NOTES
SUBJECTIVE:   CC: Marly Cannon is an 53 year old woman who presents for preventive health visit.     Healthy Habits:    Do you get at least three servings of calcium containing foods daily (dairy, green leafy vegetables, etc.)? yes    Amount of exercise or daily activities, outside of work: 2 day(s) per week    Problems taking medications regularly No    Medication side effects: No    Have you had an eye exam in the past two years? yes    Do you see a dentist twice per year? no    Do you have sleep apnea, excessive snoring or daytime drowsiness? no        -Do note, she has also recently been seen for dizziness and sweating episodes. See notes from 7/24/19 and 7/9/19. Cardiac stress test has been normal. Carotid US showed no stenosis. Zio-patch with results pending. At her last visit, she notes that her symptoms improved. Her BP was on the low side so her hydrochlorothiazide was stopped. Her oxygen sats were around 92 percent, but she had not been using her combivent correctly. She was encouraged to use this 4 times daily and Dulera was also added. She follows up today noting that she feels well. She has not had any sweating or episodes of dizziness since our last visit. She has however coming in to help with medications once weekly.     Today's PHQ-2 Score:   PHQ-2 ( 1999 Pfizer) 7/31/2019 7/23/2019   Q1: Little interest or pleasure in doing things 0 0   Q2: Feeling down, depressed or hopeless 0 0   PHQ-2 Score 0 0     Abuse: Current or Past(Physical, Sexual or Emotional)- No  Do you feel safe in your environment? Yes    Social History     Tobacco Use     Smoking status: Current Every Day Smoker     Packs/day: 1.00     Years: 34.00     Pack years: 34.00     Types: Cigarettes     Start date: 1/1/1979     Smokeless tobacco: Never Used     Tobacco comment: Declined QP 04/24/19   Substance Use Topics     Alcohol use: No     Alcohol/week: 0.0 oz     If you drink alcohol do you typically have >3 drinks per day or >7  drinks per week? No                     Reviewed orders with patient.  Reviewed health maintenance and updated orders accordingly - Yes  BP Readings from Last 3 Encounters:   07/31/19 100/64   07/23/19 100/68   07/18/19 111/78    Wt Readings from Last 3 Encounters:   07/31/19 96.3 kg (212 lb 6.4 oz)   07/23/19 97.5 kg (215 lb)   07/18/19 96.3 kg (212 lb 4.8 oz)                  Patient Active Problem List   Diagnosis     Type 2 diabetes, HbA1c goal < 7% (H)     ACP (advance care planning)     Stage 3 chronic kidney disease (H)     Pulmonary emphysema (H)     Hyperparathyroidism due to renal insufficiency (H)     Morbid obesity (H)     Vitamin D deficiency     Intellectual disability     Breast fibrocystic disorder     Tobacco abuse     Microalbuminuria due to type 2 diabetes mellitus (H)     H/O colonoscopy     [place] cardiac risk <10% in next 10 years     Hypomagnesemia     Tubular adenoma of colon     Hyperlipidemia, unspecified hyperlipidemia type     Essential hypertension     Callus of foot     Past Surgical History:   Procedure Laterality Date     COLONOSCOPY N/A 8/20/2015    Procedure: COLONOSCOPY;  Surgeon: Gentry Porter MD;  Location: [facility]     COLONOSCOPY N/A 9/21/2018    Procedure: COLONOSCOPY;  COLONOSCOPY WITH POLYPECTOMY;  Surgeon: Gentry Porter MD;  Location: [facility]       Social History     Tobacco Use     Smoking status: Current Every Day Smoker     Packs/day: 1.00     Years: 34.00     Pack years: 34.00     Types: Cigarettes     Start date: 1/1/1979     Smokeless tobacco: Never Used     Tobacco comment: Declined QP 04/24/19   Substance Use Topics     Alcohol use: No     Alcohol/week: 0.0 oz     Family History   Problem Relation Age of Onset     Diabetes Mother      Diabetes Father      Hypertension Brother      Diabetes Sister          Current Outpatient Medications   Medication Sig Dispense Refill     Acetaminophen (TYLENOL PO) Take 325 mg by mouth every 6 hours as needed         ammonium lactate (LAC-HYDRIN) 12 % cream Apply topically 2 times daily       aspirin 81 MG EC tablet Take 1 tablet (81 mg) by mouth daily 90 tablet 3     atorvastatin (LIPITOR) 40 MG tablet Take 1 tablet (40 mg) by mouth daily 90 tablet 3     blood glucose (NO BRAND SPECIFIED) test strip Use to test blood sugar 4 times daily or as directed. 300 strip 11     budesonide-formoterol (SYMBICORT) 160-4.5 MCG/ACT Inhaler Inhale 2 puffs into the lungs 2 times daily 1 Inhaler 3     cephALEXin (KEFLEX) 500 MG capsule Take 1 capsule (500 mg) by mouth 3 times daily for 10 days 30 capsule 0     Cholecalciferol (VITAMIN D-3 PO) Take 2,000 Units by mouth daily       Cyclobenzaprine HCl (FLEXERIL PO) Take 10 mg by mouth 3 times daily as needed for muscle spasms       dulaglutide (TRULICITY) 1.5 MG/0.5ML pen Inject 1.5 mg Subcutaneous every 7 days 2 mL 3     hydrocortisone 2.5 % cream Apply topically 2 times daily       insulin aspart (NOVOLOG PEN) 100 UNIT/ML pen Inject 5 units if BG is greater than 200; inject 10 units if BG is greater than 300 before meals.  TDD: 30 units 15 mL 3     insulin degludec (TRESIBA FLEXTOUCH) 200 UNIT/ML pen Inject 84 Units Subcutaneous daily 18 mL 3     insulin pen needle (32G X 4 MM) 32G X 4 MM miscellaneous Use 1 pen needles daily 100 each 3     Ipratropium-Albuterol (COMBIVENT RESPIMAT)  MCG/ACT inhaler Inhale 1 puff into the lungs 4 times daily       LANTUS SOLOSTAR 100 UNIT/ML soln        lisinopril (PRINIVIL/ZESTRIL) 40 MG tablet 40 mg daily       nystatin (MYCOSTATIN) 467897 UNIT/GM external powder Apply topically 3 times daily 60 g 3     ONETOUCH DELICA LANCETS 33G MISC 1 each 3 times daily 100 each 10     PRIMIDONE PO Take by mouth At Bedtime       SIMVASTATIN PO Take 20 mg by mouth At Bedtime        triamcinolone (KENALOG) 0.1 % cream Apply topically 2 times daily       No Known Allergies     Due for the Shingles vaccine. Would like to first check insurance coverage.     Mammogram  "Screening: Patient over age 50, mutual decision to screen reflected in health maintenance.  Pertinent mammograms are reviewed under the imaging tab. Last mammogram done on 7/1/19. This was benign.     She does smoke about 2 ppd. No interest in quitting. Declines low dose chest CT.     History of abnormal Pap smear: NO - age 30- 65 PAP every 3 years recommended, last was done in 2014, results were negative     Reviewed and updated as needed this visit by clinical staff  Tobacco  Allergies  Meds         Reviewed and updated as needed this visit by Provider        No past medical history on file.   Past Surgical History:   Procedure Laterality Date     COLONOSCOPY N/A 8/20/2015    Procedure: COLONOSCOPY;  Surgeon: Gentry Porter MD;  [facility]     COLONOSCOPY N/A 9/21/2018    Procedure: COLONOSCOPY;  COLONOSCOPY WITH POLYPECTOMY;  Surgeon: Gentry Porter MD;  Location: [facility]       ROS:  CONSTITUTIONAL: NEGATIVE for fever, chills, change in weight  INTEGUMENTARY/SKIN: scaly rash on bilateral feet, follows with podiatry  EYES: NEGATIVE for vision changes or irritation  ENT: NEGATIVE for ear, mouth and throat problems  RESP: NEGATIVE for significant cough or SOB  BREAST: NEGATIVE for masses, tenderness or discharge  CV: NEGATIVE for chest pain, palpitations or peripheral edema  GI: NEGATIVE for nausea, abdominal pain, heartburn, or change in bowel habits  : NEGATIVE for unusual urinary or vaginal symptoms. No vaginal bleeding. She notes that she does have sore on her labia that occasionally drains purulent fluid  MUSCULOSKELETAL: NEGATIVE for significant arthralgias or myalgia  NEURO: NEGATIVE for weakness, dizziness or paresthesias  PSYCHIATRIC: NEGATIVE for changes in mood or affect     OBJECTIVE:   /64 (BP Location: Left arm, Patient Position: Chair, Cuff Size: Adult Large)   Pulse 97   Temp 98.1  F (36.7  C) (Tympanic)   Ht 1.631 m (5' 4.2\")   Wt 96.3 kg (212 lb 6.4 oz)   SpO2 92%   " BMI 36.23 kg/m    EXAM:  GENERAL APPEARANCE: alert, no distress and obese  EYES: Eyes grossly normal to inspection, PERRL and conjunctivae and sclerae normal, wears glasses  HENT: ear canals and TM's normal, she does have skin tag in right ear canal, nose and mouth without ulcers or lesions, oropharynx clear and oral mucous membranes moist  NECK: no adenopathy, no asymmetry, masses, or scars and thyroid normal to palpation  RESP: lungs clear to auscultation - no rales, rhonchi or wheezes, diminished in the bases  BREAST: normal without masses, tenderness or nipple discharge and no palpable axillary masses or adenopathy, patient does have erythematous rash under each breast-appears like candida  CV: regular rate and rhythm, normal S1 S2, no S3 or S4, no murmur  ABDOMEN: soft, nontender, no hepatosplenomegaly, no masses and bowel sounds normal  MS: no musculoskeletal defects are noted and gait is age appropriate without ataxia  SKIN: patient has erythematous, dry, scaly rash on feet and ankles  NEURO: Normal strength and tone, sensory exam grossly normal, mentation intact and speech normal  DIABETIC FOOT EXAM: normal DP and PT pulses and normal sensory exam, many calluses noted  PSYCH: mentation appears normal and affect normal/bright  : Patient has erythematous, painful mass on right labia, currently not draining any fluid-does appear infected, vaginal vault without drainage, normal cervix without lesions, no pain with bimanual exam    Diagnostic Test Results:  none     ASSESSMENT/PLAN:   (Z00.00) Routine general medical examination at a health care facility  (primary encounter diagnosis)  Plan: Mammogram and colonoscopy UTD. Would like to check with insurance regarding the Shingles vaccine. Other vaccines are UTD. Will up date pap today.     (Z12.4) Screening for cervical cancer  Plan: A pap thin layer screen with  HPV - recommended        age 30 - 65 years (select HPV order below), HPV        High Risk Types DNA  "Cervical        Will notify patient of the results when available and intervene accordingly.     (Z72.0) Tobacco abuse  Comment: smoking 2 ppd  Plan: Cessation encouraged. Declines low dose chest CT.     (R42) Dizziness  (R68.89) Cold sweat  Comment: Symptoms resolved.   Plan: Unsure what has made this better, but most likely med changes. We have stopped her hydrochlorothiazide as her BP was on the low side of normal. Was possible having hypotension. We also added Dulera as her oxygen sats were low at 92 percent. She does have COPD and they are still low, but she notes that she no longer feels short of breath. The DM center is also working with her to get better control of her glucoses.      (B37.89) Candidiasis of breast  Plan: nystatin (MYCOSTATIN) 059203 UNIT/GM external         powder        Patient was encouraged to use the Nystatin powder and keep area dry by cutting up old cotton t-shirt to wic moisture.     (L72.9,  L08.9) Infected cyst of skin  Comment: patient has infected cyst on labia, she notes that she has had this for months and it periodically drains purulent fluid  Plan: cephALEXin (KEFLEX) 500 MG capsule        Will treat with Keflex times 10 days, but recheck in 2 weeks. Creatinine clearance 68. If not getting better, will refer to OB-GYN for removal. She will return sooner with new or worsening symptoms.     (R21) Rash  Comment: patient with scaly rash on bilateral  feet  Plan: managed by podiatry, will continue to see them.         COUNSELING:   Reviewed preventive health counseling, as reflected in patient instructions       Regular exercise       Healthy diet/nutrition       Vision screening       Hearing screening    Estimated body mass index is 36.23 kg/m  as calculated from the following:    Height as of this encounter: 1.631 m (5' 4.2\").    Weight as of this encounter: 96.3 kg (212 lb 6.4 oz).    Weight management plan: Discussed healthy diet and exercise guidelines     reports that she " has been smoking cigarettes.  She started smoking about 40 years ago. She has a 34.00 pack-year smoking history. She has never used smokeless tobacco.  Tobacco Cessation Action Plan: Information offered: Patient not interested at this time    Counseling Resources:  ATP IV Guidelines  Pooled Cohorts Equation Calculator  Breast Cancer Risk Calculator  FRAX Risk Assessment  ICSI Preventive Guidelines  Dietary Guidelines for Americans, 2010  USDA's MyPlate  ASA Prophylaxis  Lung CA Screening    Amberly Whyte NP  [facility]

## 2019-07-23 NOTE — TELEPHONE ENCOUNTER
"Received DENIAL from [facility] for Dulera. Denial reason: \"The drug requested is non formulary. Patient must try the preferred drugs: Symbicort HFA aerosol inhaler AND Breo Ellipta powder for inhalation.\" Pharmacy advised. Forms scanned to [facility].  "

## 2019-07-23 NOTE — NURSING NOTE
"Chief Complaint   Patient presents with     Hypertension     Musculoskeletal Problem     left knee has been getting better      Excessive Sweating     follow up has been getting better        Initial /68 (BP Location: Left arm, Patient Position: Chair, Cuff Size: Adult Large)   Pulse 86   Temp 97.7  F (36.5  C) (Tympanic)   Ht 1.626 m (5' 4\")   Wt 97.5 kg (215 lb)   SpO2 92%   BMI 36.90 kg/m   Estimated body mass index is 36.9 kg/m  as calculated from the following:    Height as of this encounter: 1.626 m (5' 4\").    Weight as of this encounter: 97.5 kg (215 lb).  Medication Reconciliation: complete  "

## 2019-07-23 NOTE — PROGRESS NOTES
Pt came in for a CGM monitor download today. This was completed and given to Courtney Chaudhary.    Gege Walter

## 2019-07-23 NOTE — TELEPHONE ENCOUNTER
Comment written on pt's 07/31/19 appt to have me remove this while there. Appt scheduled on 08/14/19 to follow up on results. Pt notified and appt card mailed to pt.

## 2019-07-23 NOTE — TELEPHONE ENCOUNTER
Received PA request from s for Dulera. Submitted as urgent request through [facility]. Waiting for response.

## 2019-07-23 NOTE — PROGRESS NOTES
Clinic Care Coordination Contact  Care Team Conversations    CC received an update from Amberly Whyte NP following pt's clinic visit today.  She reports the pt continues to have 'sweating spells' at times.  Her knee pain is reportedly improved.  O2 was borderline low today.  She needs to be using her combivent regularly and Amberly also ordered Dulera.  CC has requested that Marly's home care nurse Nikole please assure pt has a system in place to assure compliance with these inhalers- ie encouraging routine and placing inhalers near other meds for reminder.  Advised her home care nurse that hydrochlorothiazide was also discontinued today.      Further, pt had her CGM download done today.  Avg glucose is 402.  It does not appear that having the RN watch the pt administer her Tresiba at home on Friday 7/19 made any impact on her glucose readings at all.  We did lower her dosage to 40 units out of concern for prior non-compliance and possibility of giving too much when pt does become compliant.  Will inform Courtney Chaudhary NP and have her review the CGM summary.    CC also scheduled pt to have another nurse visit for CGM download on 7/31 when she is in to see Amberly again.  She is scheduled to see Courtney again on 8/14/19.    Lauren Pipkin, BS-RN   CHF and General Care Coordinator  [phone] Option # 1

## 2019-07-23 NOTE — LETTER
My COPD Action Plan     Name: Marly Cannon    YOB: 1965   Date: 7/23/2019    My doctor: Amberly Whyte NP   My clinic: [facility] [place]    [facility] [address]  [place] [address]  [phone]  My Controller Medicine: Albuterol/Ipratropium (Duoneb, Combivent)   Dose: 1 puff 4 times daily      My Rescue Medicine: n/a   Dose: n/a     My Flare Up Medicine: n/a   Dose: n/a      My COPD Severity: Mild = FeV1 > 80%      Use of Oxygen: Oxygen Not Prescribed      Make sure you've had your pneumonia   vaccines.          GREEN ZONE       Doing well today      Usual level of activity and exercise    Usual amount of cough and mucus    No shortness of breath    Usual level of health (thinking clearly, sleeping well, feel like eating) Actions:      Take daily medicines    Use oxygen as prescribed    Follow regular exercise and diet plan    Avoid cigarette smoke and other irritants that harm the lungs           YELLOW ZONE          Having a bad day or flare up      Short of breath more than usual    A lot more sputum (mucus) than usual    Sputum looks yellow, green, tan, brown or bloody    More coughing or wheezing    Fever or chills    Less energy; trouble completing activities    Trouble thinking or focusing    Using quick relief inhaler or nebulizer more often    Poor sleep; symptoms wake me up    Do not feel like eating Actions:      Get plenty of rest    Take daily medicines    Use quick relief inhaler every 4  hours    If you use oxygen, call you doctor to see if you should adjust your oxygen    Do breathing exercises or other things to help you relax    Let a loved one, friend or neighbor know you are feeling worse    Call your care team if you have 2 or more symptoms.  Start taking steroids or antibiotics if directed by your care team           RED ZONE       Need medical care now      Severe shortness of breath (feel you can't breathe)    Fever, chills    Not enough breath to do any  activity    Trouble coughing up mucus, walking or talking    Blood in mucus    Frequent coughing   Rescue medicines are not working    Not able to sleep because of breathing    Feel confused or drowsy    Chest pain    Actions:      Call your health care team.  If you cannot reach your care team, call 911 or go to the emergency room.        Annual Reminders:  Meet with Care Team, Flu Shot every Fall  Pharmacy: [facility], [place] - [address]

## 2019-07-24 DIAGNOSIS — J43.9 PULMONARY EMPHYSEMA, UNSPECIFIED EMPHYSEMA TYPE (H): Primary | ICD-10-CM

## 2019-07-24 RX ORDER — BUDESONIDE AND FORMOTEROL FUMARATE DIHYDRATE 160; 4.5 UG/1; UG/1
2 AEROSOL RESPIRATORY (INHALATION) 2 TIMES DAILY
Qty: 1 INHALER | Refills: 3 | Status: SHIPPED | OUTPATIENT
Start: 2019-07-24 | End: 2024-03-08

## 2019-07-26 NOTE — PROGRESS NOTES
Amberly Whyte NP  You; Courtney Chaudhary NP 2 days ago      That would be awesome. Is there any way we could get her a PCA?    Routing comment       Courtney Chaudhary NP  You; Amberly Whyte NP 2 days ago      Noted--BG average 402.     As discussed, it there a way we can get home care come in for short term every other day medication management of her insulin.       Can we have Nikole have Marly inject Tresiba 80 units the next times she's there.  Obviously, Marly isn't taking her insulin and Tresiba 40 units wasn't enough.       Other thoughts?     Courtney

## 2019-07-26 NOTE — PROGRESS NOTES
Clinic Care Coordination Contact  Care Team Conversations    CC did speak with Marly's home care nurse Nikole this morning regarding her insulin.  Advised pt needs to increase her Tresiba back to 80 units daily.  Discussed with Nikole that her diabetes provider would like to know if they are able to have someone go in every other day for a week or two with decreasing frequencies over time to assure Marly is injecting her insulin.  This is not possible given the resources available through home care at this time.  Discussed how this may not be the best long-term solution to Marly's issue.    Marly used to live in a group home and was discharged to live independently in her own apartment with her boyfriend.  She did not have any services in place prior to CC becoming involved recently when we set up home care for her.  CC nor home care RN are aware of any involvement by the [facility] with Marly (ie no [facility] ).  Pt would likely benefit from a PCA, however, this does need to be discussed with the patient to assure she is accepting of this and pt would need to contact [facility] to request these services for herself.  CC will see if her PCP Amberly Whyte, NP and CC can have this conversation with Marly in person.  CC can then assist pt with making this call.  May also want to consider a diya discussion with Marly about how we know she is not taking her insulin at all and request honest answers about why this is not occurring.  If this continues and she refuses to work on improving her compliance then filing a VA should be considered.  We want to support Marly's decision to live independently but this needs to be a safe situation and right now it is unsafe.    Nikole, home care RN, also reported the pt has nothing healthy at home to eat.  She states she has all junk food, including a lot of candy and pop, that she is consuming on a daily basis.  Her boyfriend also brings high fat/high calorie  foods home frequently from [facility] where he works.  Nikole does note that Marly's boyfriend treats her very kindly and is very supportive but is lacking education and insight into her situation.      Pt will be in next Wednesday 7/31 to see Amberly Whyte NP.  CC will plan to attend this appointment and support provider discussion on the above issues.    Lauren Pipkin, BS-RN   CHF and General Care Coordinator  [phone] Option # 1

## 2019-07-31 ENCOUNTER — PATIENT OUTREACH (OUTPATIENT)
Dept: CARE COORDINATION | Facility: OTHER | Age: 54
End: 2019-07-31

## 2019-07-31 ENCOUNTER — ALLIED HEALTH/NURSE VISIT (OUTPATIENT)
Dept: EDUCATION SERVICES | Facility: OTHER | Age: 54
End: 2019-07-31
Attending: NURSE PRACTITIONER
Payer: MEDICARE

## 2019-07-31 ENCOUNTER — OFFICE VISIT (OUTPATIENT)
Dept: FAMILY MEDICINE | Facility: OTHER | Age: 54
End: 2019-07-31
Attending: NURSE PRACTITIONER
Payer: MEDICARE

## 2019-07-31 VITALS
SYSTOLIC BLOOD PRESSURE: 100 MMHG | HEART RATE: 97 BPM | TEMPERATURE: 98.1 F | DIASTOLIC BLOOD PRESSURE: 64 MMHG | BODY MASS INDEX: 36.26 KG/M2 | HEIGHT: 64 IN | OXYGEN SATURATION: 92 % | WEIGHT: 212.4 LBS

## 2019-07-31 DIAGNOSIS — Z79.4 TYPE 2 DIABETES MELLITUS WITH HYPERGLYCEMIA, WITH LONG-TERM CURRENT USE OF INSULIN (H): Primary | ICD-10-CM

## 2019-07-31 DIAGNOSIS — L08.9 INFECTED CYST OF SKIN: ICD-10-CM

## 2019-07-31 DIAGNOSIS — Z00.00 ROUTINE GENERAL MEDICAL EXAMINATION AT A HEALTH CARE FACILITY: Primary | ICD-10-CM

## 2019-07-31 DIAGNOSIS — R42 DIZZINESS: ICD-10-CM

## 2019-07-31 DIAGNOSIS — Z72.0 TOBACCO ABUSE: ICD-10-CM

## 2019-07-31 DIAGNOSIS — R68.89 COLD SWEAT: ICD-10-CM

## 2019-07-31 DIAGNOSIS — E11.65 TYPE 2 DIABETES MELLITUS WITH HYPERGLYCEMIA, WITH LONG-TERM CURRENT USE OF INSULIN (H): Primary | ICD-10-CM

## 2019-07-31 DIAGNOSIS — B37.89 CANDIDIASIS OF BREAST: ICD-10-CM

## 2019-07-31 DIAGNOSIS — R21 RASH: ICD-10-CM

## 2019-07-31 DIAGNOSIS — Z12.4 SCREENING FOR CERVICAL CANCER: ICD-10-CM

## 2019-07-31 DIAGNOSIS — L72.9 INFECTED CYST OF SKIN: ICD-10-CM

## 2019-07-31 PROCEDURE — G0463 HOSPITAL OUTPT CLINIC VISIT: HCPCS

## 2019-07-31 PROCEDURE — G0123 SCREEN CERV/VAG THIN LAYER: HCPCS | Mod: ZL | Performed by: NURSE PRACTITIONER

## 2019-07-31 PROCEDURE — 99396 PREV VISIT EST AGE 40-64: CPT | Performed by: NURSE PRACTITIONER

## 2019-07-31 PROCEDURE — 87624 HPV HI-RISK TYP POOLED RSLT: CPT | Mod: ZL | Performed by: NURSE PRACTITIONER

## 2019-07-31 PROCEDURE — G0476 HPV COMBO ASSAY CA SCREEN: HCPCS | Mod: ZL | Performed by: NURSE PRACTITIONER

## 2019-07-31 RX ORDER — CEPHALEXIN 500 MG/1
500 CAPSULE ORAL 3 TIMES DAILY
Qty: 30 CAPSULE | Refills: 0 | Status: SHIPPED | OUTPATIENT
Start: 2019-07-31 | End: 2019-08-13

## 2019-07-31 RX ORDER — NYSTATIN 100000 [USP'U]/G
POWDER TOPICAL 3 TIMES DAILY
Qty: 60 G | Refills: 3 | Status: SHIPPED | OUTPATIENT
Start: 2019-07-31 | End: 2024-05-16

## 2019-07-31 ASSESSMENT — PAIN SCALES - GENERAL: PAINLEVEL: NO PAIN (0)

## 2019-07-31 ASSESSMENT — MIFFLIN-ST. JEOR: SCORE: 1546.62

## 2019-07-31 ASSESSMENT — ACTIVITIES OF DAILY LIVING (ADL): DEPENDENT_IADLS:: INDEPENDENT

## 2019-07-31 NOTE — NURSING NOTE
"Chief Complaint   Patient presents with     Physical     complete physical        Initial /64 (BP Location: Left arm, Patient Position: Chair, Cuff Size: Adult Large)   Pulse 97   Temp 98.1  F (36.7  C) (Tympanic)   Ht 1.631 m (5' 4.2\")   Wt 96.3 kg (212 lb 6.4 oz)   SpO2 92%   BMI 36.23 kg/m   Estimated body mass index is 36.23 kg/m  as calculated from the following:    Height as of this encounter: 1.631 m (5' 4.2\").    Weight as of this encounter: 96.3 kg (212 lb 6.4 oz).  Medication Reconciliation: complete  "

## 2019-07-31 NOTE — LETTER
8/6/2019     Matthew Cannon  [address]  [facility] [address]      Dear Matthew:    Thank you for allowing me to participate in your care. Your recent test results were reviewed and listed below.      Your results are provided below for your review    Your results are normal.    Results for orders placed or performed in visit on 07/31/19   A pap thin layer screen with  HPV - recommended age 30 - 65 years (select HPV order below)   Result Value Ref Range    PAP NIL     Copath Report         Patient Name: MATTHEW CANNON  MR#: [id number]  Specimen #: [id number]  Collected: 7/31/2019  Received: 8/1/2019  Reported: 8/2/2019 14:04  Ordering Phy(s): KATERYNA POSADA    For improved result formatting, select 'View Enhanced Report Format' under   Linked Documents section.    SPECIMEN/STAIN PROCESS:  Pap thin layer prep screening (Surepath)       Pap-Cyto x 1, HPV ordered x 1    SOURCE: Cervical, endocervical  ----------------------------------------------------------------   Pap thin layer prep screening (Surepath)  SPECIMEN ADEQUACY:  Satisfactory for evaluation.  -Transformation zone component present.    CYTOLOGIC INTERPRETATION:    Negative for intraepithelial lesion or malignancy    Electronically signed out by:  RACIEL Ann ( ASCP)    CLINICAL HISTORY:    Injection,    Papanicolaou Test Limitations:  Cervical cytology is a screening test with   limited sensitivity; regular  screening is critical for cancer prevention; Pap tests are primarily   effective for the diagno sis/prevention of  squamous cell carcinoma, not adenocarcinomas or other cancers.    COLLECTION SITE:  Client:  [facility]  Location: [facility] ([facility])    The technical component of this testing was completed at [facility], with the professional  component performed at [facility], [address] [place] [address], [place] [address] ([phone])       HPV High Risk Types DNA Cervical    Result Value Ref Range    HPV Source SurePath     HPV 16 DNA Negative NEG^Negative    HPV 18 DNA Negative NEG^Negative    Other HR HPV Negative NEG^Negative    Final Diagnosis This patient's sample is negative for HPV DNA.     Specimen Description Cervical Cells                 As a result, please continue with the treatment plan discussed in the office. Return as discussed or sooner if symptoms worsens or fail to improve. If you have any further questions or concerns, please do not hesitate to contact us.      Sincerely,    Karolina Camacho LPN   [facility]  [address]  [place] [address]  Phone: [phone]

## 2019-07-31 NOTE — PROGRESS NOTES
Pt came in for a CGM monitor download and removal today. This was completed and held for her appt on 08/14/19.    Gege Walter

## 2019-07-31 NOTE — PROGRESS NOTES
Clinic Care Coordination Contact  Care Team Conversations    CC discussed pt's visit today with PCP Amberly Whyte NP.  Updated home care nurse Renea who is filling in for Nikole TATE today.  Renea reported pt is NOT taking her insulin at all.  She states she found several insulin pens and one had a sticker on it indicating it was the current pen as of 6/18.  Pt's last accu-chek readings on her meter were on the 22nd of July.  She had Marly check her blood sugar while she was there yesterday and it was in the 400's.  Renea is going to speak with Nikole when she returns and see what more can be done on their end.  She mentioned involving their  to go in to see if more services are needed or if there is anything else that can be done.  She states the patient never admitted to her that she is not taking her insulin and CC advised she denies any missed doses every time she is asked by our providers.  Also advised she declines needing or wanting a PCA or other assistance so this makes the situation challenging.  Advised prior to CC involvement, pt did not even have home care.  Renea was advised of updates from pt's visit today including addition of antibiotic to treat her infected labial cyst as well as nystatin powder to treat her skin yeast infection under her breasts.    She was advised pt did have her CGM downloaded today in clinic but CC has yet to review this and discuss results with Courtney Chaudhary NP.  Will update on any new info if/when available.      Lauren Pipkin, BS-RN   [facility] and General Care Coordinator  [phone] Option # 1

## 2019-08-01 NOTE — PROGRESS NOTES
Subjective     Marly Cannon is a 53 year old female who presents to clinic today for the following health issues:    HPI   Follow Up Sweating Spells    Do note, patient has been seen several times in the past month for sweating spells/dizziness. She was last seen on 7/31/19 for a complete physical and was feeling better. Cardiac stress test was normal. Carotid US showed no stenosis. Zio-patch looked unremarkable. Her BP was on the low side so her hydrochlorothiazide was stopped. She also has COPD and her oxygen sats were around 92 percent, but she had not been using her combivent correctly. She was encouraged to use this 4 times daily and Dulera was also added. Her insulin was also adjusted for fear she was having episodes of hypoglycemia. Come to find out, she was not even checking her glucose and making up her glucose readings. Her last A1C was 11.0 on 5/29/19. She does live with her boyfriend, but has not been giving herself her insulin. When she lived with [email or web address] her glucose/DM was very well controlled as they helped with her DM management. She, however, declines to live with them again. She follows up today noting that she feels well. She has not had any more dizziness. No sweating spells. She does smoke, but has no interest in quitting. Denies chest pain, shortness of breath, dizziness, syncope, or palpitations.     Secondly, when she was seen for her complete physical on 7/31/19, she was found to have an infected, painful cyst on her labia. She was placed on 10 days of Keflex, but notes that she developed a second cyst and went to the ER yesterday. An I and D was done and she was switched to doxycycline. Today she tells me that they feel much better, one is still draining a small amount of yellow fluid. No fevers. No belly pain. No nausea or vomiting.       Patient Active Problem List   Diagnosis     Type 2 diabetes, HbA1c goal < 7% (H)     ACP (advance care planning)     Stage 3 chronic kidney disease (H)      Pulmonary emphysema (H)     Hyperparathyroidism due to renal insufficiency (H)     Morbid obesity (H)     Vitamin D deficiency     Intellectual disability     Breast fibrocystic disorder     Tobacco abuse     Microalbuminuria due to type 2 diabetes mellitus (H)     H/O colonoscopy     [place] cardiac risk <10% in next 10 years     Hypomagnesemia     Tubular adenoma of colon     Hyperlipidemia, unspecified hyperlipidemia type     Essential hypertension     Callus of foot     Past Surgical History:   Procedure Laterality Date     COLONOSCOPY N/A 8/20/2015    Procedure: COLONOSCOPY;  Surgeon: Gentry Porter MD;  Location: [facility]     COLONOSCOPY N/A 9/21/2018    Procedure: COLONOSCOPY;  COLONOSCOPY WITH POLYPECTOMY;  Surgeon: Gentry Porter MD;  Location: [facility] OR       Social History     Tobacco Use     Smoking status: Current Every Day Smoker     Packs/day: 1.00     Years: 34.00     Pack years: 34.00     Types: Cigarettes     Start date: 1/1/1979     Smokeless tobacco: Never Used     Tobacco comment: Declined QP 04/24/19   Substance Use Topics     Alcohol use: No     Alcohol/week: 0.0 oz     Family History   Problem Relation Age of Onset     Diabetes Mother      Diabetes Father      Hypertension Brother      Diabetes Sister          Current Outpatient Medications   Medication Sig Dispense Refill     Acetaminophen (TYLENOL PO) Take 325 mg by mouth every 6 hours as needed        ammonium lactate (LAC-HYDRIN) 12 % cream Apply topically 2 times daily       aspirin 81 MG EC tablet Take 1 tablet (81 mg) by mouth daily 90 tablet 3     atorvastatin (LIPITOR) 40 MG tablet Take 1 tablet (40 mg) by mouth daily 90 tablet 3     blood glucose (NO BRAND SPECIFIED) test strip Use to test blood sugar 4 times daily or as directed. 300 strip 11     budesonide-formoterol (SYMBICORT) 160-4.5 MCG/ACT Inhaler Inhale 2 puffs into the lungs 2 times daily 1 Inhaler 3     Cholecalciferol (VITAMIN D-3) 1000 units CAPS Take 2,000  "Units by mouth daily 90 capsule 11     Cyclobenzaprine HCl (FLEXERIL PO) Take 10 mg by mouth 3 times daily as needed for muscle spasms       doxycycline hyclate (VIBRAMYCIN) 100 MG capsule Take 1 capsule (100 mg) by mouth 2 times daily for 10 days 20 capsule 0     dulaglutide (TRULICITY) 1.5 MG/0.5ML pen Inject 1.5 mg Subcutaneous every 7 days 2 mL 3     hydrocortisone 2.5 % cream Apply topically 2 times daily       insulin aspart (NOVOLOG PEN) 100 UNIT/ML pen Inject 5 units if BG is greater than 200; inject 10 units if BG is greater than 300 before meals.  TDD: 30 units 15 mL 3     insulin degludec (TRESIBA FLEXTOUCH) 200 UNIT/ML pen Inject 84 Units Subcutaneous daily 18 mL 3     insulin pen needle (32G X 4 MM) 32G X 4 MM miscellaneous Use 1 pen needles daily 100 each 3     Ipratropium-Albuterol (COMBIVENT RESPIMAT)  MCG/ACT inhaler Inhale 1 puff into the lungs 4 times daily       LANTUS SOLOSTAR 100 UNIT/ML soln        lisinopril (PRINIVIL/ZESTRIL) 40 MG tablet 40 mg daily       nystatin (MYCOSTATIN) 616165 UNIT/GM external powder Apply topically 3 times daily 60 g 3     ONETOUCH DELICA LANCETS 33G MISC 1 each 3 times daily 100 each 10     order for DME Women briefs 50 each 1     primidone (MYSOLINE) 50 MG tablet Take 1 tablet (50 mg) by mouth At Bedtime 30 tablet 0     SIMVASTATIN PO Take 20 mg by mouth At Bedtime        triamcinolone (KENALOG) 0.1 % cream Apply topically 2 times daily       No Known Allergies    Reviewed and updated as needed this visit by Provider         Review of Systems   As noted in the HPI.       Objective    /68 (BP Location: Left arm, Patient Position: Chair, Cuff Size: Adult Large)   Pulse 90   Temp 97.6  F (36.4  C) (Tympanic)   Ht 1.626 m (5' 4\")   Wt 96.2 kg (212 lb)   SpO2 98%   BMI 36.39 kg/m    Body mass index is 36.39 kg/m .  Physical Exam   GENERAL: alert, no distress and obese  NECK: no adenopathy  RESP: lungs clear to auscultation - no rales, rhonchi or " wheezes  CV: regular rate and rhythm, no murmur  ABDOMEN: soft, nontender, no hepatosplenomegaly, no masses and bowel sounds normal   (female): Cyst on right labia is no longer inflamed and erythematous, cyst in right inguinal fold open and draining scant amount of serosang fluid, mild erythema  PSYCH: mentation appears normal, affect normal/bright  LOWER LEGS-no edema    Diagnostic Test Results:  none         Assessment & Plan   (R42) Dizziness  (primary encounter diagnosis)  (R68.89) Cold sweat  Comment: resolved  Plan: follow up if symptoms return.     (L72.9,  L08.9) Infected cyst of skin  Comment: appears to be healing  Plan: Continue doxycycline that was prescribed in the ER. F/u 2 weeks for reassessment, sooner with new or worsening symptoms.     (J43.9) Pulmonary emphysema, unspecified emphysema type (H)  Plan: Controlled on current inhalers. Declines to quit smoking. Oxygen sats 98 percent today.     (I10) Essential hypertension  Plan: Well controlled. Continue current medications. Encouraged daily exercise and a low sodium diet. Recommended checking BP's 2x/wk, call the clinic if consistantly s>140 or d>90. Follow up in 6 months.       (F79) Intellectual disability  (E11.9) Type 2 diabetes, HbA1c goal < 7% (H)  Comment: unsure if patient is able to manage DM on her own, is concerning as she is taking insulin and we would not want her to give herself too much  Plan: NEUROPSYCHOLOGY REFERRAL        Will refer for neuropsych testing. Will notify patient of the results when available and intervene accordingly.          Tobacco Cessation:   reports that she has been smoking cigarettes.  She started smoking about 40 years ago. She has a 34.00 pack-year smoking history. She has never used smokeless tobacco.  Tobacco Cessation Action Plan: Information offered: Patient not interested at this time      BMI:   Estimated body mass index is 36.23 kg/m  as calculated from the following:    Height as of 7/31/19: 1.631  "m (5' 4.2\").    Weight as of 7/31/19: 96.3 kg (212 lb 6.4 oz).   Weight management plan: Discussed healthy diet and exercise guidelines      Amberly Whyte NP  [facility] [place]      "

## 2019-08-01 NOTE — PROGRESS NOTES
CGM download:    BG average:   418    Time in Range  >180: 100%  : 0  <70: 0    As we all know, Marly isn't taking her insulin as prescribed.    Will discuss the plan with Amberly Whyte NP and Josie KABA, Care Coordinator.     Courtney PINTO [facility]-BC  Diabetes and Wound Care

## 2019-08-02 ENCOUNTER — TELEPHONE (OUTPATIENT)
Dept: FAMILY MEDICINE | Facility: OTHER | Age: 54
End: 2019-08-02

## 2019-08-02 LAB
COPATH REPORT: NORMAL
PAP: NORMAL

## 2019-08-02 NOTE — TELEPHONE ENCOUNTER
Pt called states she's been having diarrhea from the Keflex she's been taking. She states she's been taking a probiotic. She wanted Amberly to know.  Please advise.

## 2019-08-06 LAB
FINAL DIAGNOSIS: NORMAL
HPV HR 12 DNA CVX QL NAA+PROBE: NEGATIVE
HPV16 DNA SPEC QL NAA+PROBE: NEGATIVE
HPV18 DNA SPEC QL NAA+PROBE: NEGATIVE
SPECIMEN DESCRIPTION: NORMAL
SPECIMEN SOURCE CVX/VAG CYTO: NORMAL

## 2019-08-07 DIAGNOSIS — I10 ESSENTIAL HYPERTENSION: ICD-10-CM

## 2019-08-07 DIAGNOSIS — N25.81 HYPERPARATHYROIDISM DUE TO RENAL INSUFFICIENCY (H): Primary | ICD-10-CM

## 2019-08-07 RX ORDER — ASPIRIN 81 MG/1
81 TABLET ORAL DAILY
Qty: 90 TABLET | Refills: 3 | Status: SHIPPED | OUTPATIENT
Start: 2019-08-07 | End: 2020-09-11

## 2019-08-07 RX ORDER — CHOLECALCIFEROL (VITAMIN D3) 25 MCG
2000 CAPSULE ORAL DAILY
Qty: 90 CAPSULE | Refills: 11 | Status: SHIPPED | OUTPATIENT
Start: 2019-08-07 | End: 2020-09-11

## 2019-08-07 NOTE — TELEPHONE ENCOUNTER
Vitamin D3  Last visit date with prescribing provider: 7-  Last refill date: no refill hx    Quantity: ?, Refills: ?    ASA   Last visit date with prescribing provider: 7-  Last refill date: 6-  Quantity: 90, Refills: 3    Grace Wallace      Next 5 appointments (look out 90 days)    Aug 13, 2019 10:40 AM CDT  (Arrive by 10:25 AM)  Office Visit with Amberly Whyte NP  [facility] [address]  [place] [address]  [phone]   Aug 30, 2019 11:00 AM CDT  (Arrive by 10:45 AM)  SHORT with Amberly Whyte NP  [facility] ([facility] ) [address]  [place] [address]  [phone]            Next 5 appointments (look out 90 days)    Aug 13, 2019 10:40 AM CDT  (Arrive by 10:25 AM)  [facility] [place] ([facility] [address]  [place] [address]  [phone]   Aug 30, 2019 11:00 AM CDT  (Arrive by 10:45 AM)  SHORT with [facility] [place] ([facility] ) [address]  [place] [address]  [phone]

## 2019-08-09 DIAGNOSIS — G25.0 ESSENTIAL TREMOR: Primary | ICD-10-CM

## 2019-08-09 RX ORDER — PRIMIDONE 50 MG/1
50 TABLET ORAL AT BEDTIME
Qty: 30 TABLET | Refills: 3 | OUTPATIENT
Start: 2019-08-09

## 2019-08-09 NOTE — TELEPHONE ENCOUNTER
Primidone      Last Written Prescription Date:  historical  Last Fill Quantity: 0,   # refills: 0  Last Office Visit: 7/31/2019  Future Office visit:    Next 5 appointments (look out 90 days)    Aug 13, 2019 10:40 AM CDT  (Arrive by 10:25 AM)  [facility]  [facility] [address]  [place] [address]  [phone]   Aug 30, 2019 11:00 AM CDT  (Arrive by 10:45 AM)  SHORT with Amberly Whyte NP  [facility] ([facility] [address]  [place] [address]  [phone]           Routing refill request to provider for review/approval because:  Drug not on the [facility], [facility] or [facility] refill protocol or controlled substance  Drug not active on patient's medication list

## 2019-08-09 NOTE — TELEPHONE ENCOUNTER
Should have original pre scriber or have Amberly address next week when she returns.     Sera PINTO FNP-BC  Family Nurse Practitioner

## 2019-08-12 ENCOUNTER — HOSPITAL ENCOUNTER (EMERGENCY)
Facility: HOSPITAL | Age: 54
Discharge: HOME | End: 2019-08-12
Attending: PHYSICIAN ASSISTANT | Admitting: PHYSICIAN ASSISTANT
Payer: MEDICARE

## 2019-08-12 ENCOUNTER — TELEPHONE (OUTPATIENT)
Dept: FAMILY MEDICINE | Facility: OTHER | Age: 54
End: 2019-08-12

## 2019-08-12 VITALS
OXYGEN SATURATION: 94 % | RESPIRATION RATE: 16 BRPM | SYSTOLIC BLOOD PRESSURE: 117 MMHG | TEMPERATURE: 98.3 F | DIASTOLIC BLOOD PRESSURE: 84 MMHG

## 2019-08-12 DIAGNOSIS — L02.91 ABSCESS: ICD-10-CM

## 2019-08-12 DIAGNOSIS — N39.3 FEMALE STRESS INCONTINENCE: Primary | ICD-10-CM

## 2019-08-12 PROCEDURE — G0463 HOSPITAL OUTPT CLINIC VISIT: HCPCS | Mod: 25

## 2019-08-12 PROCEDURE — 10060 I&D ABSCESS SIMPLE/SINGLE: CPT | Performed by: NURSE PRACTITIONER

## 2019-08-12 PROCEDURE — G0463 HOSPITAL OUTPT CLINIC VISIT: HCPCS

## 2019-08-12 PROCEDURE — 10060 I&D ABSCESS SIMPLE/SINGLE: CPT

## 2019-08-12 PROCEDURE — 99213 OFFICE O/P EST LOW 20 MIN: CPT | Mod: 25 | Performed by: PHYSICIAN ASSISTANT

## 2019-08-12 RX ORDER — PRIMIDONE 50 MG/1
50 TABLET ORAL AT BEDTIME
Qty: 30 TABLET | Refills: 0 | Status: SHIPPED | OUTPATIENT
Start: 2019-08-12 | End: 2019-09-11

## 2019-08-12 RX ORDER — DOXYCYCLINE 100 MG/1
100 CAPSULE ORAL 2 TIMES DAILY
Qty: 20 CAPSULE | Refills: 0 | Status: SHIPPED | OUTPATIENT
Start: 2019-08-12 | End: 2019-08-29

## 2019-08-12 ASSESSMENT — ENCOUNTER SYMPTOMS
NUMBNESS: 0
FEVER: 0

## 2019-08-12 NOTE — ED AVS SNAPSHOT
[facility]  [address] [address]  Phone:  [phone]                                    Marly Cannon   MRN: [id number]    Department:  [facility]   Date of Visit:  8/12/2019           After Visit Summary Signature Page    I have received my discharge instructions, and my questions have been answered. I have discussed any challenges I see with this plan with the nurse or doctor.    ..........................................................................................................................................  Patient/Patient Representative Signature      ..........................................................................................................................................  Patient Representative Print Name and Relationship to Patient    ..................................................               ................................................  Date                                   Time    ..........................................................................................................................................  Reviewed by Signature/Title    ...................................................              ..............................................  Date                                               Time          22EPIC Rev 08/18

## 2019-08-12 NOTE — TELEPHONE ENCOUNTER
Patient called and asking for a prescription for briefs to be sent [facility]. Please advise.

## 2019-08-12 NOTE — ED PROVIDER NOTES
[facility]    PROCEDURE: Incision and drainage  Date/Time: 8/12/2019 5:35 PM  Performed by: Jennifer Schultz CNP  Authorized by: Jennifer Schultz CNP     ED EVALUATION:      Difficult Airway?: No    ASA Class: Class 1- healthy patient  UNIVERSAL PROTOCOL   Site Marked: NA  Prior Images Obtained and Reviewed:  NA  Required items: Required blood products, implants, devices and special equipment available    Patient identity confirmed:  Verbally with patient and arm band  NA - No sedation, light sedation, or local anesthesia  Confirmation Checklist:  Patient's identity using two indicators, relevant allergies, procedure was appropriate and matched the consent or emergent situation and correct equipment/implants were available  Preparation: Patient was prepped and draped in usual sterile fashion      LOCATION:      Type:  Abscess    Location: right groin.    PRE-PROCEDURE DETAILS:     Skin preparation:  Betadine    ANESTHESIA (see MAR for exact dosages):     Anesthesia method:  Local infiltration    Local anesthetic:  Lidocaine 1% w/o epi    PROCEDURE TYPE:     Complexity:  Simple    SEDATION    Patient Sedated: No      PROCEDURE DETAILS:     Incision types:  Single straight and stab incision    Incision depth:  Subcutaneous    Scalpel blade:  11    Wound management:  Probed and deloculated    Drainage:  Bloody    Drainage amount:  Moderate    Wound treatment:  Wound left open    Packing materials:  None  Post-procedure details:     PROCEDURE   Patient Tolerance:  Patient tolerated the procedure well with no immediate complications    Time of Sedation in Minutes by Physician:  0               Jennifer Schultz CNP  08/12/19 5795

## 2019-08-12 NOTE — ED PROVIDER NOTES
History     Chief Complaint   Patient presents with     boil     c/o boil on her groin     HPI  Marly Cannon is a 54 year old female who presents to urgent care for boil in groin area.  Patient was seen by her primary care provider on 7/31/2019 and put on Keflex for boils.  Since then she has developed another boil in her groin area.  She states that it has been draining.  She denies any fevers, numbness or tingling to affected area, or any other associated symptoms.      allergies:  No Known Allergies    Problem List:    Patient Active Problem List    Diagnosis Date Noted     Callus of foot 05/29/2019     Priority: Medium     Hyperlipidemia, unspecified hyperlipidemia type 05/28/2019     Priority: Medium     Essential hypertension 05/28/2019     Priority: Medium     H/O colonoscopy 04/24/2019     Priority: Medium     Had in 10/2018, due for repeat 5 years       [place] cardiac risk <10% in next 10 years 02/22/2019     Priority: Medium     Overview:   Started high intensity statin.        Tubular adenoma of colon 09/28/2018     Priority: Medium     Hypomagnesemia 04/10/2018     Priority: Medium     Intellectual disability 08/01/2017     Priority: Medium     ACP (advance care planning) 09/09/2016     Priority: Medium     Advance Care Planning 9/9/2016: ACP Review of Chart / Resources Provided:  Reviewed chart for advance care plan.  Marly Cannon has been provided information and resources to begin or update their advance care plan.  Added by Naty Allen             Hyperparathyroidism due to renal insufficiency (H) 06/14/2016     Priority: Medium     Vitamin D deficiency 06/14/2016     Priority: Medium     Microalbuminuria due to type 2 diabetes mellitus (H) 06/14/2016     Priority: Medium     Stage 3 chronic kidney disease (H) 02/12/2016     Priority: Medium     Overview:   Updated per 10/1/17 IMO import       Morbid obesity (H) 02/12/2016     Priority: Medium     Pulmonary emphysema (H) 08/21/2015      Priority: Medium     Confirmed via PFTs in 8/2015       Type 2 diabetes, HbA1c goal < 7% (H) 07/24/2015     Priority: Medium     Tobacco abuse 07/09/2015     Priority: Medium     Breast fibrocystic disorder 02/22/2008     Priority: Medium     Overview:   IMO Update 10/11          Past Medical History:    No past medical history on file.    Past Surgical History:    Past Surgical History:   Procedure Laterality Date     COLONOSCOPY N/A 8/20/2015    Procedure: COLONOSCOPY;  Surgeon: Gentry Porter MD;  Location: [facility]     COLONOSCOPY N/A 9/21/2018    Procedure: COLONOSCOPY;  COLONOSCOPY WITH POLYPECTOMY;  Surgeon: Gentry Porter MD;  Location: [facility]       Family History:    Family History   Problem Relation Age of Onset     Diabetes Mother      Diabetes Father      Hypertension Brother      Diabetes Sister        Social History:  Marital Status:   [5]  Social History     Tobacco Use     Smoking status: Current Every Day Smoker     Packs/day: 1.00     Years: 34.00     Pack years: 34.00     Types: Cigarettes     Start date: 1/1/1979     Smokeless tobacco: Never Used     Tobacco comment: Declined QP 04/24/19   Substance Use Topics     Alcohol use: No     Alcohol/week: 0.0 oz     Drug use: No        Medications:      Acetaminophen (TYLENOL PO)   ammonium lactate (LAC-HYDRIN) 12 % cream   aspirin 81 MG EC tablet   atorvastatin (LIPITOR) 40 MG tablet   blood glucose (NO BRAND SPECIFIED) test strip   budesonide-formoterol (SYMBICORT) 160-4.5 MCG/ACT Inhaler   Cholecalciferol (VITAMIN D-3) 1000 units CAPS   Cyclobenzaprine HCl (FLEXERIL PO)   doxycycline hyclate (VIBRAMYCIN) 100 MG capsule   dulaglutide (TRULICITY) 1.5 MG/0.5ML pen   hydrocortisone 2.5 % cream   insulin aspart (NOVOLOG PEN) 100 UNIT/ML pen   insulin degludec (TRESIBA FLEXTOUCH) 200 UNIT/ML pen   insulin pen needle (32G X 4 MM) 32G X 4 MM miscellaneous   Ipratropium-Albuterol (COMBIVENT RESPIMAT)  MCG/ACT inhaler   LANTUS SOLOSTAR  100 UNIT/ML soln   lisinopril (PRINIVIL/ZESTRIL) 40 MG tablet   nystatin (MYCOSTATIN) 849279 UNIT/GM external powder   ONETOUCH DELICA LANCETS 33G MISC   order for DME   primidone (MYSOLINE) 50 MG tablet   SIMVASTATIN PO   triamcinolone (KENALOG) 0.1 % cream         Review of Systems   Constitutional: Negative for fever.   Skin:        Boil, groin area   Neurological: Negative for numbness.       Physical Exam   BP: 117/84  Heart Rate: 102  Temp: 98.3  F (36.8  C)  Resp: 16  SpO2: 94 %      Physical Exam   Constitutional: She is oriented to person, place, and time. She appears well-developed and well-nourished. No distress.   Cardiovascular: Normal rate, regular rhythm and normal heart sounds.   Pulmonary/Chest: Effort normal and breath sounds normal. No respiratory distress.   Genitourinary:         Genitourinary Comments: Patient has 2 cm x 2 cm boil present in the right inguinal fold.  There is no visible drainage at this time.  It is fluctuant with palpation and painful.   Neurological: She is alert and oriented to person, place, and time.   Nursing note and vitals reviewed.      ED Course        Procedures              Critical Care time:               No results found for this or any previous visit (from the past 24 hour(s)).    Medications - No data to display    Assessments & Plan (with Medical Decision Making)   #1.  Abscess, right inguinal area    Discussed exam findings with patient.  Patient had I&D performed in urgent care today by Jennifer Schultz CNP; see procedure note.  Patient was started on course of doxycycline and is to discontinue Keflex.  Wound care is discussed extensively with patient.  I would like patient to follow-up with primary care provider this next week.  Patient verbalizes understanding and agreement of plan.      I have reviewed the nursing notes.    I have reviewed the findings, diagnosis, plan and need for follow up with the patient.    Discharge Medication List as of 8/12/2019   6:08 PM      START taking these medications    Details   doxycycline hyclate (VIBRAMYCIN) 100 MG capsule Take 1 capsule (100 mg) by mouth 2 times daily for 10 days, Disp-20 capsule, R-0, E-Prescribe             Final diagnoses:   Abscess       8/12/2019   [facility]     Kavin Juarez PA-C  08/12/19 2186

## 2019-08-12 NOTE — ED NOTES
Pt presents with a boil to the right side of her groin. Was prescribed Cephalexin for boils on 7-31-19 by her pcp. States unsure if they are helping. Previously had a boil that she was seen for on 7-31 which has since popped and now states she has a new one. Unsure of how big the boil is. States it burns and she has a hard time sitting. States the boil is seeping.

## 2019-08-13 ENCOUNTER — OFFICE VISIT (OUTPATIENT)
Dept: FAMILY MEDICINE | Facility: OTHER | Age: 54
End: 2019-08-13
Attending: NURSE PRACTITIONER
Payer: MEDICARE

## 2019-08-13 VITALS
OXYGEN SATURATION: 98 % | HEIGHT: 64 IN | DIASTOLIC BLOOD PRESSURE: 68 MMHG | BODY MASS INDEX: 36.19 KG/M2 | HEART RATE: 90 BPM | TEMPERATURE: 97.6 F | WEIGHT: 212 LBS | SYSTOLIC BLOOD PRESSURE: 114 MMHG

## 2019-08-13 DIAGNOSIS — F79 INTELLECTUAL DISABILITY: ICD-10-CM

## 2019-08-13 DIAGNOSIS — L08.9 INFECTED CYST OF SKIN: ICD-10-CM

## 2019-08-13 DIAGNOSIS — N39.3 FEMALE STRESS INCONTINENCE: ICD-10-CM

## 2019-08-13 DIAGNOSIS — L72.9 INFECTED CYST OF SKIN: ICD-10-CM

## 2019-08-13 DIAGNOSIS — E11.9 TYPE 2 DIABETES, HBA1C GOAL < 7% (H): ICD-10-CM

## 2019-08-13 DIAGNOSIS — J43.9 PULMONARY EMPHYSEMA, UNSPECIFIED EMPHYSEMA TYPE (H): ICD-10-CM

## 2019-08-13 DIAGNOSIS — R68.89 COLD SWEAT: ICD-10-CM

## 2019-08-13 DIAGNOSIS — I10 ESSENTIAL HYPERTENSION: ICD-10-CM

## 2019-08-13 DIAGNOSIS — R42 DIZZINESS: Primary | ICD-10-CM

## 2019-08-13 PROCEDURE — G0463 HOSPITAL OUTPT CLINIC VISIT: HCPCS

## 2019-08-13 PROCEDURE — 99214 OFFICE O/P EST MOD 30 MIN: CPT | Performed by: NURSE PRACTITIONER

## 2019-08-13 ASSESSMENT — PAIN SCALES - GENERAL: PAINLEVEL: NO PAIN (0)

## 2019-08-13 ASSESSMENT — MIFFLIN-ST. JEOR: SCORE: 1536.63

## 2019-08-13 NOTE — TELEPHONE ENCOUNTER
womens briefs  Last visit date with prescribing provider:8-  Last refill date: 8-  Quantity: 50, Refills: 1    Send to [facility] please  Was printed yesterday but not faxed    Grace Wallace        Next 5 appointments (look out 90 days)    Sep 11, 2019  3:30 PM CDT  (Arrive by 3:15 PM)  Office Visit with Amberly Whyte NP  [facility] ([facility] ) [address]  [place] [address]  [phone]

## 2019-08-13 NOTE — NURSING NOTE
"Chief Complaint   Patient presents with     Hypertension       Initial /68 (BP Location: Left arm, Patient Position: Chair, Cuff Size: Adult Large)   Pulse 90   Temp 97.6  F (36.4  C) (Tympanic)   Ht 1.626 m (5' 4\")   Wt 96.2 kg (212 lb)   SpO2 98%   BMI 36.39 kg/m   Estimated body mass index is 36.39 kg/m  as calculated from the following:    Height as of this encounter: 1.626 m (5' 4\").    Weight as of this encounter: 96.2 kg (212 lb).  Medication Reconciliation: complete  "

## 2019-08-14 ENCOUNTER — PATIENT OUTREACH (OUTPATIENT)
Dept: CARE COORDINATION | Facility: OTHER | Age: 54
End: 2019-08-14

## 2019-08-14 ENCOUNTER — ALLIED HEALTH/NURSE VISIT (OUTPATIENT)
Dept: EDUCATION SERVICES | Facility: OTHER | Age: 54
End: 2019-08-14
Attending: NURSE PRACTITIONER
Payer: MEDICARE

## 2019-08-14 ENCOUNTER — MEDICAL CORRESPONDENCE (OUTPATIENT)
Dept: HEALTH INFORMATION MANAGEMENT | Facility: HOSPITAL | Age: 54
End: 2019-08-14

## 2019-08-14 VITALS
WEIGHT: 210.4 LBS | HEART RATE: 87 BPM | OXYGEN SATURATION: 92 % | RESPIRATION RATE: 16 BRPM | SYSTOLIC BLOOD PRESSURE: 117 MMHG | HEIGHT: 64 IN | DIASTOLIC BLOOD PRESSURE: 77 MMHG | BODY MASS INDEX: 35.92 KG/M2

## 2019-08-14 DIAGNOSIS — E11.65 TYPE 2 DIABETES MELLITUS WITH HYPERGLYCEMIA, WITH LONG-TERM CURRENT USE OF INSULIN (H): ICD-10-CM

## 2019-08-14 DIAGNOSIS — Z72.0 TOBACCO ABUSE: ICD-10-CM

## 2019-08-14 DIAGNOSIS — Z71.6 TOBACCO ABUSE COUNSELING: ICD-10-CM

## 2019-08-14 DIAGNOSIS — Z79.4 TYPE 2 DIABETES MELLITUS WITH HYPERGLYCEMIA, WITH LONG-TERM CURRENT USE OF INSULIN (H): ICD-10-CM

## 2019-08-14 PROCEDURE — G0463 HOSPITAL OUTPT CLINIC VISIT: HCPCS

## 2019-08-14 PROCEDURE — 99213 OFFICE O/P EST LOW 20 MIN: CPT | Performed by: NURSE PRACTITIONER

## 2019-08-14 ASSESSMENT — ACTIVITIES OF DAILY LIVING (ADL): DEPENDENT_IADLS:: INDEPENDENT

## 2019-08-14 ASSESSMENT — MIFFLIN-ST. JEOR: SCORE: 1529.37

## 2019-08-14 ASSESSMENT — PAIN SCALES - GENERAL: PAINLEVEL: NO PAIN (0)

## 2019-08-14 NOTE — PROGRESS NOTES
Clinic Care Coordination Contact  Care Team Conversations    Care coordinator attended office visit with pt and Courtney Chaudhary NP this date.  Please refer to today's progress note for updates to patient's plan of care.  Encouraged pt to call care coordinator with any questions/concerns/problems.  Verbalized understanding.    Lauren Pipkin, BS-RN   CHF and General Care Coordinator  [phone] Option # 1

## 2019-08-14 NOTE — PATIENT INSTRUCTIONS
Continue working on healthy eating and moving as best as you can (start low and slow, work up to 30 min, 5x/week)    BG goals:  Fasting and before meals <130, >70  2 hour after eating <180    We only need 1/2 of these numbers to be within target then your A1c will be within target    Diabetes Medications    1.  Trulicity  Trulicity 1.5 mg every Tuesday    2.  Novolog before meals  If BG >70 before meals: 10 units  If BG is greater than 300 before meal: 15 units   If BG is greater than 400 before meal: 20 units    3.  Tresiba  80 units daily in the outer thigh      HOW TO QUIT SMOKING  Smoking is one of the hardest habits to break. About half of all those who have ever smoked have been able to quit, and most of those (about 70%) who still smoke want to quit. Here are some of the best ways to stop smoking.     KEEP TRYING:  It takes most smokers about 8 tries before they are finally able to fully quit. So, the more often you try and fail, the better your chance of quitting the next time! So, don't give up!    GO COLD TURKEY:  Most ex-smokers quit cold turkey. Trying to cut back gradually doesn't seem to work as well, perhaps because it continues the smoking habit. Also, it is possible to fool yourself by inhaling more while smoking fewer cigarettes. This results in the same amount of nicotine in your body!    GET SUPPORT:  Support programs can make an important difference, especially for the heavy smoker. These groups offer lectures, methods to change your behavior and peer support. Call the free national Quitline for more information. 800-QUIT-NOW (215-806-2216). Low-cost or free programs are offered by many hospitals, local chapters of the American Lung Association (914-123-8016) and the American Cancer Society (253-772-9662). Support at home is important too. Non-smokers can help by offering praise and encouragement. If the smoker fails to quit, encourage them to try again!    OVER-THE-COUNTER MEDICINES:  For those  who can't quit on their own, Nicotine Replacement Therapy (NRT) may make quitting much easier. Certain aids such as the nicotine patch, gum and lozenge are available without a prescription. However, it is best to use these under the guidance of your doctor. The skin patch provides a steady supply of nicotine to the body. Nicotine gum and lozenge gives temporary bursts of low levels of nicotine. Both methods take the edge off the craving for cigarettes. WARNING: If you feel symptoms of nicotine overdose, such as nausea, vomiting, dizziness, weakness, or fast heartbeat, stop using these and see your doctor.    PRESCRIPTION MEDICINES:  After evaluating your smoking patterns and prior attempts at quitting, your doctor may offer a prescription medicine such as bupropion (Zyban, Wellbutrin), varenicline (Chantix, Champix), a niocotine inhaler or nasal spray. Each has its unique advantage and side effects which your doctor can review with you.    HEALTH BENEFITS OF QUITTING:  The benefits of quitting start right away and keep improving the longer you go without smokin minutes: blood pressure and pulse return to normal  8 hours: oxygen levels return to normal  2 days: ability to smell and taste begins to improve as damaged nerves start to regrow  2-3 weeks: circulation and lung function improves  1-9 months: decreased cough, congestion and shortness of breath; less tired  1 year: risk of heart attack decreases by half  5 years: risk of lung cancer decreases by half; risk of stroke becomes the same as a non-smoker  For information about how to quit smoking, visit the following links:  National Cancer [place] ,   Clearing the Air, Quit Smoking Today   - an online booklet. http://www.smokefree.gov/pubs/clearing_the_air.pdf  Smokefree.gov http://smokefree.gov/  QuitNet http://www.quitnet.com/    2542-7067 Elisa Munson, [address] [place] [address], [place] [address]. All rights reserved. This information is not intended as  a substitute for professional medical care. Always follow your healthcare professional's instructions.    The Benefits of Living Smoke Free  What do you want to gain from quitting? Check off some reasons to quit.  Health Benefits  ___ Reduce my risk of lung cancer, heart disease, chronic lung disease  ___ Have fewer wrinkles and softer skin  ___ Improve my sense of taste and smell  ___ For pregnant women--reduce the risk of having a miscarriage, stillbirth, premature birth, or low-birth-weight baby  Personal Benefits  ___ Feel more in control of my life  ___ Have better-smelling hair, breath, clothes, home, and car  ___ Save time by not having to take smoke breaks, buy cigarettes, or hunt for a light  ___ Have whiter teeth  Family Benefits  ___ Reduce my children s respiratory tract infections  ___ Set a good example for my children  ___ Reduce my family s cancer risk  Financial Benefits  ___ Save hundreds of dollars each year that would be spent on cigarettes  ___ Save money on medical bills  ___ Save on life, health, and car insurance premiums    Those Dollars Add Up!  Cigarettes are expensive, and getting more expensive all the time. Do you realize how much money you are spending on cigarettes per year? What is the average amount you spend on a pack of cigarettes? What is the average number of packs that you smoke per day? Using your answers to these questions, fill in this formula to help you find out:  ($ _____ per pack) ×  ( _____ number of packs per day) × (365 days) =  $ _____ yearly cost of smoking  Besides tobacco, there are other costs, including extra cleaning bills and replacement costs for clothing and furniture; medical expenses for smoking-related illnesses; and higher health, life, and car insurance premiums.    Cigars and Pipes Count Too!  Cigars and pipes are also dangerous. So are smokeless (chewing) tobacco and snuff. All of these products contain nicotine, a highly addictive substance that has  harmful effects on your body. Quitting smoking means giving up all tobacco products.      1259-2699 Elisa Munson, [address] [facility], [place] [address]. All rights reserved. This information is not intended as a substitute for professional medical care. Always follow your healthcare professional's instructions.

## 2019-08-14 NOTE — PROGRESS NOTES
"SUBJECTIVE:  Marly Cannon, 54 year old, female presents with the following Chief Complaint(s) with HPI to follow:  Chief Complaint   Patient presents with     Diabetes        Diabetes Follow-up    Patient is checking blood sugars: 0.5x/day.  Results:  Highest: 502  Lowest: 173  Period average: 333    Values above, >180: 6  Values within goal (): 1  Values below goal, <70: 0        Symptoms of hypoglycemia (low blood sugar): none    Paresthesias (numbness or burning in feet) or sores: No    Diabetic eye exam within the last year: Yes    Breakfast eaten regularly: once in awhile     Patient counting carbs: Yes    Walking: almost every day when it's nice       HPI:  Marly's here today for the follow up regarding her Diabetes mellitus, Type 2.    Lab Results   Component Value Date    A1C 11.0 05/29/2019    A1C 9.8 02/22/2019    A1C 10.9 11/21/2018    A1C 9.6 08/21/2018    A1C 9.6 08/21/2018     Current Diabetes medication:   1.  Tresiba 80  units daily @ supper time  2.  Trulicity 1.5 mg weekly with her nurse  3.  Novolog per sliding, 10 units; 15 units >300--reports no missed doses  ASA use: yes, 81 mg daily  Statin use: yes, simvastatin 20 mg daily    Marly's here today for a meter download and possible insulin adjustment.    Marly continues to deny any missed doses of her diabetic medication.    Denies any feelings of low BGs.  States she took 30 units, \"accidentally\", on Tuesday when her nurse was there.    She's also backed off her pop consumption--only one regular pop a day.  Marly's drinking crystal light instead.      Denies any new health concerns.    Reports her boil is doing better    Josie KABA, RN Care Coordinator in today's appt      Patient Active Problem List   Diagnosis     Type 2 diabetes, HbA1c goal < 7% (H)     ACP (advance care planning)     Stage 3 chronic kidney disease (H)     Pulmonary emphysema (H)     Hyperparathyroidism due to renal insufficiency (H)     Morbid obesity (H)     " Vitamin D deficiency     Intellectual disability     Breast fibrocystic disorder     Tobacco abuse     Microalbuminuria due to type 2 diabetes mellitus (H)     H/O colonoscopy     Beattie cardiac risk <10% in next 10 years     Hypomagnesemia     Tubular adenoma of colon     Hyperlipidemia, unspecified hyperlipidemia type     Essential hypertension     Callus of foot       History reviewed. No pertinent past medical history.    Past Surgical History:   Procedure Laterality Date     COLONOSCOPY N/A 8/20/2015    Procedure: COLONOSCOPY;  Surgeon: Gentry Porter MD;  Location: [facility]     COLONOSCOPY N/A 9/21/2018    Procedure: COLONOSCOPY;  COLONOSCOPY WITH POLYPECTOMY;  Surgeon: Gentry Porter MD;  Location: [facility]       Family History   Problem Relation Age of Onset     Diabetes Mother      Diabetes Father      Hypertension Brother      Diabetes Sister        Social History     Tobacco Use     Smoking status: Current Every Day Smoker     Packs/day: 1.00     Years: 34.00     Pack years: 34.00     Types: Cigarettes     Start date: 1/1/1979     Smokeless tobacco: Never Used     Tobacco comment: Declined QP 04/24/19   Substance Use Topics     Alcohol use: No     Alcohol/week: 0.0 oz       Current Outpatient Medications   Medication Sig Dispense Refill     Acetaminophen (TYLENOL PO) Take 325 mg by mouth every 6 hours as needed        ammonium lactate (LAC-HYDRIN) 12 % cream Apply topically 2 times daily       aspirin 81 MG EC tablet Take 1 tablet (81 mg) by mouth daily 90 tablet 3     atorvastatin (LIPITOR) 40 MG tablet Take 1 tablet (40 mg) by mouth daily 90 tablet 3     blood glucose (NO BRAND SPECIFIED) test strip Use to test blood sugar 4 times daily or as directed. 300 strip 11     budesonide-formoterol (SYMBICORT) 160-4.5 MCG/ACT Inhaler Inhale 2 puffs into the lungs 2 times daily 1 Inhaler 3     Cholecalciferol (VITAMIN D-3) 1000 units CAPS Take 2,000 Units by mouth daily 90 capsule 11      "Cyclobenzaprine HCl (FLEXERIL PO) Take 10 mg by mouth 3 times daily as needed for muscle spasms       dulaglutide (TRULICITY) 1.5 MG/0.5ML pen Inject 1.5 mg Subcutaneous every 7 days 2 mL 3     hydrocortisone 2.5 % cream Apply topically 2 times daily       insulin aspart (NOVOLOG PEN) 100 UNIT/ML pen Inject 5 units if BG is greater than 200; inject 10 units if BG is greater than 300 before meals.  TDD: 30 units 15 mL 3     insulin degludec (TRESIBA FLEXTOUCH) 200 UNIT/ML pen Inject 80 Units Subcutaneous daily 18 mL 3     insulin pen needle (32G X 4 MM) 32G X 4 MM miscellaneous Use 1 pen needles daily 100 each 3     Ipratropium-Albuterol (COMBIVENT RESPIMAT)  MCG/ACT inhaler Inhale 1 puff into the lungs 4 times daily       lisinopril (PRINIVIL/ZESTRIL) 40 MG tablet 40 mg daily       nystatin (MYCOSTATIN) 394960 UNIT/GM external powder Apply topically 3 times daily 60 g 3     ONETOUCH DELICA LANCETS 33G MISC 1 each 3 times daily 100 each 10     order for DME Women briefs 50 each 1     primidone (MYSOLINE) 50 MG tablet Take 1 tablet (50 mg) by mouth At Bedtime 30 tablet 0     SIMVASTATIN PO Take 20 mg by mouth At Bedtime        triamcinolone (KENALOG) 0.1 % cream Apply topically 2 times daily       LANTUS SOLOSTAR 100 UNIT/ML soln          No Known Allergies    REVIEW OF SYSTEMS  Skin: as noted above  Eyes: negative   Ears/Nose/Throat: glasses; negative  Respiratory: No shortness of breath, cough, or hemoptysis; sometimes SOB with walking long distance--using inhaler--no changes  Cardiovascular: negative  Gastrointestinal: negative  Genitourinary: recent boil  Musculoskeletal: left knee discomfort--\"good\"[facility];    Neurologic: negative  Psychiatric: negative  Hematologic/Lymphatic/Immunologic: continued  Endocrine: positive for diabetes; sweaty spells--\"once in a while I do, once in a while I don't\"     OBJECTIVE:  /77   Pulse 87   Resp 16   Ht 1.626 m (5' 4\")   Wt 95.4 kg (210 " lb 6.4 oz)   SpO2 92%   BMI 36.12 kg/m    Constitutional: alert and no distress  Musculoskeletal: extremities normal- no gross deformities noted and gait normal  Psychiatric: mentation appears normal for baseline and affect normal/bright    LABS  Results for orders placed or performed during the hospital encounter of 08/12/19   Incision and drainage    Narrative    Jennifer Schultz CNP     8/12/2019  6:31 PM  [facility]    PROCEDURE: Incision and drainage  Date/Time: 8/12/2019 5:35 PM  Performed by: Jennifer Schultz CNP  Authorized by: Jennifer Schultz CNP     ED EVALUATION:      Difficult Airway?: No    ASA Class: Class 1- healthy patient  UNIVERSAL PROTOCOL   Site Marked: NA  Prior Images Obtained and Reviewed:  NA  Required items: Required blood products, implants, devices and special   equipment available    Patient identity confirmed:  Verbally with patient and arm band  NA - No sedation, light sedation, or local anesthesia  Confirmation Checklist:  Patient's identity using two indicators, relevant   allergies, procedure was appropriate and matched the consent or emergent   situation and correct equipment/implants were available  Preparation: Patient was prepped and draped in usual sterile fashion      LOCATION:      Type:  Abscess    Location: right groin.    PRE-PROCEDURE DETAILS:     Skin preparation:  Betadine    ANESTHESIA (see MAR for exact dosages):     Anesthesia method:  Local infiltration    Local anesthetic:  Lidocaine 1% w/o epi    PROCEDURE TYPE:     Complexity:  Simple    SEDATION    Patient Sedated: No      PROCEDURE DETAILS:     Incision types:  Single straight and stab incision    Incision depth:  Subcutaneous    Scalpel blade:  11    Wound management:  Probed and deloculated    Drainage:  Bloody    Drainage amount:  Moderate    Wound treatment:  Wound left open    Packing materials:  None  Post-procedure details:     PROCEDURE   Patient Tolerance:  Patient tolerated the procedure  well with no immediate   complications    Time of Sedation in Minutes by Physician:  0       ASSESSMENT / PLAN:  (E11.65,  Z79.4) Type 2 diabetes mellitus with hyperglycemia, with long-term current use of insulin (H)  Comment: noted BG average, still above target  Plan: insulin degludec (TRESIBA FLEXTOUCH) 200         UNIT/ML pen          Continue working on taking ALL of her prescribed doses of insulin.      (Z72.0) Tobacco abuse  Comment: noted and refused  Plan: Tobacco Cessation - Order to Satisfy Health         Maintenance          Marly's aware to let us know when she's ready to quit smoking.  Discussed the dangers of smoking with diabetes      (Z71.6) Tobacco abuse counseling  Comment: noted  Plan: Tobacco Cessation - Order to Satisfy Health         Maintenance          As mentioned above    Patient Instructions   Continue working on healthy eating and moving as best as you can (start low and slow, work up to 30 min, 5x/week)    BG goals:  Fasting and before meals <130, >70  2 hour after eating <180    We only need 1/2 of these numbers to be within target then your A1c will be within target    Diabetes Medications    1.  Trulicity  Trulicity 1.5 mg every Tuesday    2.  Novolog before meals  If BG >70 before meals: 10 units  If BG is greater than 300 before meal: 15 units   If BG is greater than 400 before meal: 20 units    3.  Tresiba  80 units daily in the outer thigh      HOW TO QUIT SMOKING  Smoking is one of the hardest habits to break. About half of all those who have ever smoked have been able to quit, and most of those (about 70%) who still smoke want to quit. Here are some of the best ways to stop smoking.     KEEP TRYING:  It takes most smokers about 8 tries before they are finally able to fully quit. So, the more often you try and fail, the better your chance of quitting the next time! So, don't give up!    GO COLD TURKEY:  Most ex-smokers quit cold turkey. Trying to cut back gradually doesn't  seem to work as well, perhaps because it continues the smoking habit. Also, it is possible to fool yourself by inhaling more while smoking fewer cigarettes. This results in the same amount of nicotine in your body!    GET SUPPORT:  Support programs can make an important difference, especially for the heavy smoker. These groups offer lectures, methods to change your behavior and peer support. Call the free national Quitline for more information. 800-QUIT-NOW (379-044-6250). Low-cost or free programs are offered by many hospitals, local chapters of the American Lung Association (830-968-6065) and the American Cancer Society (244-351-0271). Support at home is important too. Non-smokers can help by offering praise and encouragement. If the smoker fails to quit, encourage them to try again!    OVER-THE-COUNTER MEDICINES:  For those who can't quit on their own, Nicotine Replacement Therapy (NRT) may make quitting much easier. Certain aids such as the nicotine patch, gum and lozenge are available without a prescription. However, it is best to use these under the guidance of your doctor. The skin patch provides a steady supply of nicotine to the body. Nicotine gum and lozenge gives temporary bursts of low levels of nicotine. Both methods take the edge off the craving for cigarettes. WARNING: If you feel symptoms of nicotine overdose, such as nausea, vomiting, dizziness, weakness, or fast heartbeat, stop using these and see your doctor.    PRESCRIPTION MEDICINES:  After evaluating your smoking patterns and prior attempts at quitting, your doctor may offer a prescription medicine such as bupropion (Zyban, Wellbutrin), varenicline (Chantix, Champix), a niocotine inhaler or nasal spray. Each has its unique advantage and side effects which your doctor can review with you.    HEALTH BENEFITS OF QUITTING:  The benefits of quitting start right away and keep improving the longer you go without smokin minutes: blood pressure  and pulse return to normal  8 hours: oxygen levels return to normal  2 days: ability to smell and taste begins to improve as damaged nerves start to regrow  2-3 weeks: circulation and lung function improves  1-9 months: decreased cough, congestion and shortness of breath; less tired  1 year: risk of heart attack decreases by half  5 years: risk of lung cancer decreases by half; risk of stroke becomes the same as a non-smoker  For information about how to quit smoking, visit the following links:  National [employer] ,   Clearing the Air, Quit Smoking Today   - an online booklet. http://www.smokefree.gov/pubs/clearing_the_air.pdf  Smokefree.gov http://smokefree.gov/  QuitNet http://www.quitnet.com/    8591-1085 Elisa Munson, [address] [place] [address], [place] [address]. All rights reserved. This information is not intended as a substitute for professional medical care. Always follow your healthcare professional's instructions.    The Benefits of Living Smoke Free  What do you want to gain from quitting? Check off some reasons to quit.  Health Benefits  ___ Reduce my risk of lung cancer, heart disease, chronic lung disease  ___ Have fewer wrinkles and softer skin  ___ Improve my sense of taste and smell  ___ For pregnant women--reduce the risk of having a miscarriage, stillbirth, premature birth, or low-birth-weight baby  Personal Benefits  ___ Feel more in control of my life  ___ Have better-smelling hair, breath, clothes, home, and car  ___ Save time by not having to take smoke breaks, buy cigarettes, or hunt for a light  ___ Have whiter teeth  Family Benefits  ___ Reduce my children s respiratory tract infections  ___ Set a good example for my children  ___ Reduce my family s cancer risk  Financial Benefits  ___ Save hundreds of dollars each year that would be spent on cigarettes  ___ Save money on medical bills  ___ Save on life, health, and car insurance premiums    Those Dollars Add Up!  Cigarettes are  expensive, and getting more expensive all the time. Do you realize how much money you are spending on cigarettes per year? What is the average amount you spend on a pack of cigarettes? What is the average number of packs that you smoke per day? Using your answers to these questions, fill in this formula to help you find out:  ($ _____ per pack) ×  ( _____ number of packs per day) × (365 days) =  $ _____ yearly cost of smoking  Besides tobacco, there are other costs, including extra cleaning bills and replacement costs for clothing and furniture; medical expenses for smoking-related illnesses; and higher health, life, and car insurance premiums.    Cigars and Pipes Count Too!  Cigars and pipes are also dangerous. So are smokeless (chewing) tobacco and snuff. All of these products contain nicotine, a highly addictive substance that has harmful effects on your body. Quitting smoking means giving up all tobacco products.      7402-2643 [place] [address]. All rights reserved. This information is not intended as a substitute for professional medical care. Always follow your healthcare professional's instructions.    Time: 35 minutes  Barrier: see [facility]  Willingness to learn: accepting    Courtney PINTO [facility]  Disease Management    Cc: Amberly Whyte NP    With the electronic record, we can now more quickly and easily track our patient diabetic goals. Our diabetes clinical review is in progress and these are the indicators we are monitoring for good diabetes health.     1.) HbA1C less than 7 (measurement of your average blood sugars)  2.) Blood Pressure less than 140/80  3.) LDL less than 100 (bad cholesterol)  4.) HbA1C is checked in the last 6 months and below 7% (more frequently if not at goal or adjusting medications)  5.) LDL is checked in the last 12 months (more frequently if not at goal or adjusting medications)  6.) Taking one baby aspirin daily (unless otherwise  instructed)  7.) No tobacco use  8) Statin use     You have achieved 6 out of 8 of these and I am encouraging you to come in and get tuned up to achieve 8 out of 8!  Here is what you have achieved so far in my goals for you:  1.) HbA1C  less than 7:                              NO  Your last  HbA1C :  Lab Results   Component Value Date    A1C 11.0 05/29/2019    A1C 9.8 02/22/2019    A1C 10.9 11/21/2018    A1C 9.6 08/21/2018    A1C 9.6 08/21/2018     2.) Blood Pressure less than 140/80:       YES      Your last    BP Readings from Last 1 Encounters:   08/14/19 117/77     3.) LDL less than 100:                              YES      Your last     LDL Cholesterol Calculated   Date Value Ref Range Status   03/29/2019 72 <100 mg/dL Final       4.) Checked HbA1C in the past 6 months: YES      5.) Checked LDL in the past 12 months:    YES      6.) Taking one aspirin daily:                       YES     7.) No tobacco use:                                        NO   8.) Statin use      YES

## 2019-08-14 NOTE — PROGRESS NOTES
"Chief Complaint   Patient presents with     Diabetes       Initial /77   Pulse 87   Resp 16   Ht 1.626 m (5' 4\")   Wt 95.4 kg (210 lb 6.4 oz)   SpO2 92%   BMI 36.12 kg/m   Estimated body mass index is 36.12 kg/m  as calculated from the following:    Height as of this encounter: 1.626 m (5' 4\").    Weight as of this encounter: 95.4 kg (210 lb 6.4 oz).  Medication Reconciliation: complete   Gege Walter      "

## 2019-08-19 ENCOUNTER — PATIENT OUTREACH (OUTPATIENT)
Dept: CARE COORDINATION | Facility: OTHER | Age: 54
End: 2019-08-19

## 2019-08-19 ASSESSMENT — ACTIVITIES OF DAILY LIVING (ADL): DEPENDENT_IADLS:: INDEPENDENT

## 2019-08-19 NOTE — PROGRESS NOTES
Clinic Care Coordination Contact  Care Team Conversations    Pt provided CC with her 's phone number at her last visit.  Unsure if this is a new  or someone pt has been working with long-term.      CC attempted to reach Babitamehran Fabian [phone].  Left a voicemail message requesting a return call.      Lauren Pipkin, BS-RN   CHF and General Care Coordinator  [phone] Option # 1

## 2019-08-19 NOTE — PROGRESS NOTES
Clinic Care Coordination Contact  Care Team Conversations    CC received a return call from Babita.  Discussed concerns about Marly's non-compliance with her diabetes.  Informed her that up until last week, Marly reported not having a  or support from [facility] anymore.  Pt was referred to clinic care coordination as both PCP and diabetes provider have concerns pt is struggling to maintain independent med/disease management.  CC assisted with setting up home care services- skilled nurse to set up medications, however, this has proven to not be enough as pt has continued to neglect her diabetic care.  She is not checking blood sugars nor giving herself insulin regularly.  We have done sensor studies showing this data.  CC advised her  that her blood sugars have been in the 300-400's and higher.  Advised she writes down a fictitious blood sugar log and brings this to the clinic.  Meter has only one or two readings.  Marly's home care RN has informed us that she is chain-smoking all day, drinking Coke (no water ever), and has cupboards full of candy.  CC advised this lifestyle is concerning as she clearly either doesn't understand or doesn't care about diabetes management.  Advised we are very concerned that her non-compliance may lead to severe complications or death.  Advised her diabetic provider will be completing a [facility] report with these concerns as well.    Babita states Marly has an [facility] worker coming into her home 40hrs per month.  CC advised this is the first we have heard of this and she should probably make sure this is occurring.  Babita stated it is possible Marly has refused their visits.  She states it is a matter of Marly cooperating with them.  She reports Marly has lived independently for quite some time and said several times throughout our conversation that Marly 'has the right to make her own decisions' and 'is her own person'.  She does admit that [facility] was helping  her more previously but didn't speak to why they no longer do other than that she moved in with her boyfriend.  CC inquired about adding services such as a PCA to provide reminders to do diabetes management tasks (checking blood sugar, injecting insulin) as well as assist with making healthy meals and promoting better choices.  PCP also feels pt would benefit from reminders to bathe and assure she follows through with this- recent issues with skin- boils, rashes, etc.  Could consider increasing the [facility] worker involvement?  Maybe pair her up with someone she connects with better to avoid refusals if this is happening.  Babita states she will follow up with the [facility] supervisor to find out if she has, indeed, been refusing or what is happening currently.  Babita states according to their last assessment Marly doesn't qualify for a PCA.  CC advised this situation is frustrating because Marly clearly needs additional support services.  It seems like it wouldn't be that difficult to persuade her to have additional help, especially if it was someone she could connect with and trusted.  She has not refused any of the skilled nursing visits, to our knowledge, since she started this.  Appears to be quite agreeable at clinic visits as well.     CC reported the above information to pt's PCP.  Will move forward with having diabetes provider file a [facility]- especially detailing inability to educate patient to improve compliance despite multiple attempts.  Should also include detailed consequences of continued non-compliance.    Lauren Pipkin, SHIRA-RN   CHF and General Care Coordinator  [phone] Option # 1

## 2019-08-20 NOTE — PROGRESS NOTES
Clinic Care Coordination Contact  Care Team Conversations    CC discussed with Nikole TATE, pt's home care nurse, the outcome of the conversation with pt's  yesterday.  Nikole was not aware that the pt had an [facility] worker.  She did place a call today to [facility] and spoke with Hilaria.  Hilaria advised her that Marly does have an [employer] worker that visits her once weekly (NOT A PCA).  Hilaria reported to Nikole that they feel 'Marly could teach a class on diabetes' she knows so much about it.  She mentioned we have to consider a concept of 'Dignity in Risk'.    Hilaria did go on to mention to Nikole, however, that in her professional opinion, Marly should NOT be living on her own.    Lauren Pipkin, BS-RN   CHF and General Care Coordinator  [phone] Option # 1

## 2019-08-23 ENCOUNTER — PATIENT OUTREACH (OUTPATIENT)
Dept: CARE COORDINATION | Facility: OTHER | Age: 54
End: 2019-08-23

## 2019-08-23 NOTE — PROGRESS NOTES
Clinic Care Coordination Contact  Care Team Conversations    There is a note in chart indicating pt has an appointment at [facility] Neurology with Jean Claude Cristobal 11/25/19 at 9am.      *We do need to collect a new authorization for Care Everywhere at her next clinic visit so we can see [facility]'s records.  Unable to request updates anymore (unknown reason).    Lauren Pipkin, BS-RN   CHF and General Care Coordinator  [phone] Option # 1

## 2019-08-29 ENCOUNTER — HOSPITAL ENCOUNTER (OUTPATIENT)
Dept: EDUCATION SERVICES | Facility: HOSPITAL | Age: 54
Discharge: HOME | End: 2019-08-29
Attending: NURSE PRACTITIONER | Admitting: NURSE PRACTITIONER
Payer: MEDICARE

## 2019-08-29 VITALS
DIASTOLIC BLOOD PRESSURE: 76 MMHG | BODY MASS INDEX: 35.87 KG/M2 | SYSTOLIC BLOOD PRESSURE: 106 MMHG | OXYGEN SATURATION: 93 % | WEIGHT: 210.1 LBS | HEIGHT: 64 IN | HEART RATE: 100 BPM

## 2019-08-29 DIAGNOSIS — Z79.4 TYPE 2 DIABETES MELLITUS WITH HYPERGLYCEMIA, WITH LONG-TERM CURRENT USE OF INSULIN (H): ICD-10-CM

## 2019-08-29 DIAGNOSIS — E11.65 TYPE 2 DIABETES MELLITUS WITH HYPERGLYCEMIA, WITH LONG-TERM CURRENT USE OF INSULIN (H): ICD-10-CM

## 2019-08-29 LAB — HBA1C MFR BLD: 12 % (ref 0–5.7)

## 2019-08-29 PROCEDURE — 36416 COLLJ CAPILLARY BLOOD SPEC: CPT

## 2019-08-29 PROCEDURE — G0108 DIAB MANAGE TRN  PER INDIV: HCPCS

## 2019-08-29 PROCEDURE — 83036 HEMOGLOBIN GLYCOSYLATED A1C: CPT

## 2019-08-29 ASSESSMENT — MIFFLIN-ST. JEOR: SCORE: 1528.01

## 2019-08-29 ASSESSMENT — PAIN SCALES - GENERAL: PAINLEVEL: NO PAIN (0)

## 2019-08-29 NOTE — LETTER
"    8/29/2019        RE: Marly Cannon  [address]  [place] [address]        Diabetes Self-Management Education & Support    Diabetes Education Self Management & Training    SUBJECTIVE/OBJECTIVE:  Presents for: Follow-up  Accompanied by: Self  Diabetes education in the past 24mo: Yes  Focus of Visit: Monitoring, Taking Medication  Insulin Type: NovoLog, Tresiba  Diabetes type: Type 2  Disease course: Getting harder to manage(No change)  How confident are you filling out medical forms by yourself:: Not at all  Transportation concerns: No  Other concerns:: Glasses, Cognitive impairment  Cultural Influences/Ethnic Background:  American      Diabetes Symptoms & Complications  Blurred vision: No  Fatigue: No  Neuropathy: No  Foot ulcerations: No  Polydipsia: No  Polyphagia: No  Polyuria: No  Visual change: No  Weakness: No  Weight loss: No  Slow healing wounds: No  Recent Infection(s): No  Symptom course: Stable  Weight trend: Fluctuating minimally  Autonomic neuropathy: No  CVA: No  Heart disease: No  Nephropathy: Yes  Peripheral neuropathy: No  Peripheral Vascular Disease: No  Retinopathy: No  Sexual dysfunction: No    Patient Problem List and Family Medical History reviewed for relevant medical history, current medical status, and diabetes risk factors.    Vitals:  /76   Pulse 100   Ht 1.626 m (5' 4\")   Wt 95.3 kg (210 lb 1.6 oz)   SpO2 93%   BMI 36.06 kg/m     Estimated body mass index is 36.06 kg/m  as calculated from the following:    Height as of this encounter: 1.626 m (5' 4\").    Weight as of this encounter: 95.3 kg (210 lb 1.6 oz).   Last 3 BP:   BP Readings from Last 3 Encounters:   08/29/19 106/76   08/14/19 117/77   08/13/19 114/68       History   Smoking Status     Current Every Day Smoker     Packs/day: 1.00     Years: 34.00     Types: Cigarettes     Start date: 1/1/1979   Smokeless Tobacco     Never Used     Comment: Declined QP 04/24/19       Labs:  Lab Results   Component Value " Date    A1C 12.0 08/29/2019     Lab Results   Component Value Date     07/01/2019     Lab Results   Component Value Date    LDL 72 03/29/2019     HDL Cholesterol   Date Value Ref Range Status   03/29/2019 36 (L) 40 - 60 mg/dL Final   ]  GFR Estimate   Date Value Ref Range Status   07/01/2019 41 (L) >60 mL/min/[1.73_m2] Final     Comment:     Non  GFR Calc  Starting 12/18/2018, serum creatinine based estimated GFR (eGFR) will be   calculated using the Chronic Kidney Disease Epidemiology Collaboration   (CKD-EPI) equation.       GFR Estimate If Black   Date Value Ref Range Status   07/01/2019 48 (L) >60 mL/min/[1.73_m2] Final     Comment:      GFR Calc  Starting 12/18/2018, serum creatinine based estimated GFR (eGFR) will be   calculated using the Chronic Kidney Disease Epidemiology Collaboration   (CKD-EPI) equation.       Lab Results   Component Value Date    CR 1.42 07/01/2019     No results found for: MICROALBUMIN    Healthy Eating  Healthy Eating Assessed Today: Yes  Cultural/Pentecostal diet restrictions?: No  Meal planning: None  Meals include: Breakfast, Dinner  Breakfast: sandwich  Beverages: Water, Coffee, Diet soda(Crystal light)  Has patient met with a dietitian in the past?: Yes    Being Active  Being Active Assessed Today: Yes  Exercise:: Yes  Days per week of moderate to strenuous exercise (like a brisk walk): 1  On average, minutes per day of exercise at this level: 60  How intense was your typical exercise? : Moderate (like brisk walking)  Exercise Minutes per Week: 60  Barrier to exercise: Access    Monitoring  Monitoring Assessed Today: Yes  Did patient bring glucose meter to appointment? : Yes  Blood Glucose Meter: One Touch  Home Glucose (Sugar) Monitoring: 3-4 times per day(occasionally tests before supper for NovoLOG dose)  Blood glucose trend: Fluctuating dramtically  Low Glucose Range (mg/dL): 140-180  High Glucose Range (mg/dL): >200  Overall Range  (mg/dL): >200        Taking Medications  Diabetes Medication(s)     Insulin       insulin aspart (NOVOLOG PEN) 100 UNIT/ML pen    Inject 5 units if BG is greater than 100; inject 10 units if BG is greater than 200, inject 15 if BG is greater than 300.  TDD: 30 units     insulin degludec (TRESIBA FLEXTOUCH) 200 UNIT/ML pen    Inject 80 Units Subcutaneous daily     LANTUS SOLOSTAR 100 UNIT/ML soln        Incretin Mimetic Agents (GLP-1 Receptor Agonists)       dulaglutide (TRULICITY) 1.5 MG/0.5ML pen    Inject 1.5 mg Subcutaneous every 7 days      Not taking Lantus    Taking Medication Assessed Today: Yes  Current Treatments: Insulin Injections, Non-insulin Injectables  Dose schedule: pre-dinner, pre-breakfast  Given by: Patient  Injection/Infusion sites: Abdomen  Problems taking diabetes medications regularly?: Yes(concerns about taking insulin correctly)  Diabetes medication side effects?: No  Treatment Compliance: All of the time(as reported)    Problem Solving  Problem Solving Assessed Today: Yes  Hypoglycemia Frequency: Never  Patient carries a carbohydrate source: Yes    Hypoglycemia symptoms  Confusion: No  Dizziness or Light-Headedness: No  Headaches: No  Hunger: No  Mood changes: No  Nervousness/Anxiety: No  Sleepiness: No  Speech difficulty: No  Sweats: No  Tremors: No    Hypoglycemia Complications  Blackouts: No  Hospitalization: No  Nocturnal hypoglycemia: No  Required assistance: No  Required glucagon injection: No  Seizures: No    Reducing Risks  Diabetes Risks: Age over 45 years, Hyperlipidemia  CAD Risks: Diabetes Mellitus, Dyslipidemia, Hypertension, Tobacco exposure, Obesity  Has dilated eye exam at least once a year?: Yes    Healthy Coping  Healthy Coping Assessed Today: Yes  Emotional response to diabetes: Acceptance  Informal Support system:: Significant other  Stage of change: MAINTENANCE (Working to maintain change, with risk of relapse)  Difficulty affording diabetes management supplies?:  Yes  Support resources: In-person Offerings, Exercise  Patient Activation Measure Survey Score:  SHADI Score (Last Two) 2018   SHADI Raw Score 28 30   Activation Score 50 56   SHADI Level 2 3       ASSESSMENT:  Readings range as follows: fastin,145, 165, 169. 181, 185, 210- 331, pre-supper: 175, 204-371 and post-supper: 230-386, 406        Patient's most recent   Lab Results   Component Value Date    A1C 12.0 2019    is not meeting goal of <8.0    INTERVENTION:   Diabetes knowledge and skills assessment:     Patient is knowledgeable in diabetes management concepts related to: Monitoring    Patient needs further education on the following diabetes management concepts: Healthy Eating    Based on learning assessment above, most appropriate setting for further diabetes education would be: Individual setting.    Education provided today on:  AADE Self-Care Behaviors:  Monitoring: individual blood glucose targets and frequency of monitoring  Taking Medication: action of prescribed medication, drawing up, administering and storing injectable diabetes medications, proper site selection and rotation for injections, side effects of prescribed medications and when to take medications    Opportunities for ongoing education and support in diabetes-self management were discussed.    Pt verbalized understanding of concepts discussed and recommendations provided today.       Education Materials Provided:  No new materials provided today    PLAN:  See Patient Instructions for co-developed, patient-stated behavior change goals.  Increase NovoLOG before meals:  If BG is greater than 100, (100-199), inject 5 units  If BG is greater than 200, (200-299) inject 10 units  If BG is greater than 300, inject 12 units    Return on 2019, at 1:45 pm  AVS printed and provided to patient today. See Follow-Up section for recommended follow-up.      Time Spent: 30 minutes  Encounter Type: Individual    Any diabetes  medication dose changes were made via the CDE Protocol and Collaborative Practice Agreement with the patient's primary care provider. A copy of this encounter was shared with the provider.          Sincerely,        Kat Dawn RN

## 2019-08-29 NOTE — PATIENT INSTRUCTIONS
Increase NovoLOG before meals:  If BG is greater than 100, (100-199), inject 5 units  If BG is greater than 200, (200-299) inject 10 units  If BG is greater than 300, inject 12 units    Return on September 26, 2019, at 1:45 pm

## 2019-08-29 NOTE — PROGRESS NOTES
"Diabetes Self-Management Education & Support    Diabetes Education Self Management & Training    SUBJECTIVE/OBJECTIVE:  Presents for: Follow-up  Accompanied by: Self  Diabetes education in the past 24mo: Yes  Focus of Visit: Monitoring, Taking Medication  Insulin Type: NovoLog, Tresiba  Diabetes type: Type 2  Disease course: Getting harder to manage(No change)  How confident are you filling out medical forms by yourself:: Not at all  Transportation concerns: No  Other concerns:: Glasses, Cognitive impairment  Cultural Influences/Ethnic Background:  American      Diabetes Symptoms & Complications  Blurred vision: No  Fatigue: No  Neuropathy: No  Foot ulcerations: No  Polydipsia: No  Polyphagia: No  Polyuria: No  Visual change: No  Weakness: No  Weight loss: No  Slow healing wounds: No  Recent Infection(s): No  Symptom course: Stable  Weight trend: Fluctuating minimally  Autonomic neuropathy: No  CVA: No  Heart disease: No  Nephropathy: Yes  Peripheral neuropathy: No  Peripheral Vascular Disease: No  Retinopathy: No  Sexual dysfunction: No    Patient Problem List and Family Medical History reviewed for relevant medical history, current medical status, and diabetes risk factors.    Vitals:  /76   Pulse 100   Ht 1.626 m (5' 4\")   Wt 95.3 kg (210 lb 1.6 oz)   SpO2 93%   BMI 36.06 kg/m    Estimated body mass index is 36.06 kg/m  as calculated from the following:    Height as of this encounter: 1.626 m (5' 4\").    Weight as of this encounter: 95.3 kg (210 lb 1.6 oz).   Last 3 BP:   BP Readings from Last 3 Encounters:   08/29/19 106/76   08/14/19 117/77   08/13/19 114/68       History   Smoking Status     Current Every Day Smoker     Packs/day: 1.00     Years: 34.00     Types: Cigarettes     Start date: 1/1/1979   Smokeless Tobacco     Never Used     Comment: Declined QP 04/24/19       Labs:  Lab Results   Component Value Date    A1C 12.0 08/29/2019     Lab Results   Component Value Date     07/01/2019 "     Lab Results   Component Value Date    LDL 72 03/29/2019     HDL Cholesterol   Date Value Ref Range Status   03/29/2019 36 (L) 40 - 60 mg/dL Final   ]  GFR Estimate   Date Value Ref Range Status   07/01/2019 41 (L) >60 mL/min/[1.73_m2] Final     Comment:     Non  GFR Calc  Starting 12/18/2018, serum creatinine based estimated GFR (eGFR) will be   calculated using the Chronic Kidney Disease Epidemiology Collaboration   (CKD-EPI) equation.       GFR Estimate If Black   Date Value Ref Range Status   07/01/2019 48 (L) >60 mL/min/[1.73_m2] Final     Comment:      GFR Calc  Starting 12/18/2018, serum creatinine based estimated GFR (eGFR) will be   calculated using the Chronic Kidney Disease Epidemiology Collaboration   (CKD-EPI) equation.       Lab Results   Component Value Date    CR 1.42 07/01/2019     No results found for: MICROALBUMIN    Healthy Eating  Healthy Eating Assessed Today: Yes  Cultural/Sabianist diet restrictions?: No  Meal planning: None  Meals include: Breakfast, Dinner  Breakfast: sandwich  Beverages: Water, Coffee, Diet soda(Crystal light)  Has patient met with a dietitian in the past?: Yes    Being Active  Being Active Assessed Today: Yes  Exercise:: Yes  Days per week of moderate to strenuous exercise (like a brisk walk): 1  On average, minutes per day of exercise at this level: 60  How intense was your typical exercise? : Moderate (like brisk walking)  Exercise Minutes per Week: 60  Barrier to exercise: Access    Monitoring  Monitoring Assessed Today: Yes  Did patient bring glucose meter to appointment? : Yes  Blood Glucose Meter: One Touch  Home Glucose (Sugar) Monitoring: 3-4 times per day(occasionally tests before supper for NovoLOG dose)  Blood glucose trend: Fluctuating dramtically  Low Glucose Range (mg/dL): 140-180  High Glucose Range (mg/dL): >200  Overall Range (mg/dL): >200        Taking Medications  Diabetes Medication(s)     Insulin       insulin aspart  (NOVOLOG PEN) 100 UNIT/ML pen    Inject 5 units if BG is greater than 100; inject 10 units if BG is greater than 200, inject 15 if BG is greater than 300.  TDD: 30 units     insulin degludec (TRESIBA FLEXTOUCH) 200 UNIT/ML pen    Inject 80 Units Subcutaneous daily     LANTUS SOLOSTAR 100 UNIT/ML soln        Incretin Mimetic Agents (GLP-1 Receptor Agonists)       dulaglutide (TRULICITY) 1.5 MG/0.5ML pen    Inject 1.5 mg Subcutaneous every 7 days      Not taking Lantus    Taking Medication Assessed Today: Yes  Current Treatments: Insulin Injections, Non-insulin Injectables  Dose schedule: pre-dinner, pre-breakfast  Given by: Patient  Injection/Infusion sites: Abdomen  Problems taking diabetes medications regularly?: Yes(concerns about taking insulin correctly)  Diabetes medication side effects?: No  Treatment Compliance: All of the time(as reported)    Problem Solving  Problem Solving Assessed Today: Yes  Hypoglycemia Frequency: Never  Patient carries a carbohydrate source: Yes    Hypoglycemia symptoms  Confusion: No  Dizziness or Light-Headedness: No  Headaches: No  Hunger: No  Mood changes: No  Nervousness/Anxiety: No  Sleepiness: No  Speech difficulty: No  Sweats: No  Tremors: No    Hypoglycemia Complications  Blackouts: No  Hospitalization: No  Nocturnal hypoglycemia: No  Required assistance: No  Required glucagon injection: No  Seizures: No    Reducing Risks  Diabetes Risks: Age over 45 years, Hyperlipidemia  CAD Risks: Diabetes Mellitus, Dyslipidemia, Hypertension, Tobacco exposure, Obesity  Has dilated eye exam at least once a year?: Yes    Healthy Coping  Healthy Coping Assessed Today: Yes  Emotional response to diabetes: Acceptance  Informal Support system:: Significant other  Stage of change: MAINTENANCE (Working to maintain change, with risk of relapse)  Difficulty affording diabetes management supplies?: Yes  Support resources: In-person Offerings, Exercise  Patient Activation Measure Survey Score:  SHADI  Score (Last Two) 2018   SHADI Raw Score 28 30   Activation Score 50 56   SHADI Level 2 3       ASSESSMENT:  Readings range as follows: fastin,145, 165, 169. 181, 185, 210- 331, pre-supper: 175, 204-371 and post-supper: 230-386, 406        Patient's most recent   Lab Results   Component Value Date    A1C 12.0 2019    is not meeting goal of <8.0    INTERVENTION:   Diabetes knowledge and skills assessment:     Patient is knowledgeable in diabetes management concepts related to: Monitoring    Patient needs further education on the following diabetes management concepts: Healthy Eating    Based on learning assessment above, most appropriate setting for further diabetes education would be: Individual setting.    Education provided today on:  AADE Self-Care Behaviors:  Monitoring: individual blood glucose targets and frequency of monitoring  Taking Medication: action of prescribed medication, drawing up, administering and storing injectable diabetes medications, proper site selection and rotation for injections, side effects of prescribed medications and when to take medications    Opportunities for ongoing education and support in diabetes-self management were discussed.    Pt verbalized understanding of concepts discussed and recommendations provided today.       Education Materials Provided:  No new materials provided today    PLAN:  See Patient Instructions for co-developed, patient-stated behavior change goals.  Increase NovoLOG before meals:  If BG is greater than 100, (100-199), inject 5 units  If BG is greater than 200, (200-299) inject 10 units  If BG is greater than 300, inject 12 units    Return on 2019, at 1:45 pm  AVS printed and provided to patient today. See Follow-Up section for recommended follow-up.      Time Spent: 30 minutes  Encounter Type: Individual    Any diabetes medication dose changes were made via the CDE Protocol and Collaborative Practice Agreement with the  patient's primary care provider. A copy of this encounter was shared with the provider.

## 2019-08-29 NOTE — TELEPHONE ENCOUNTER
dulaglutide (TRULICITY) 1.5 MG/0.5ML pen  Last Written Prescription Date:  5/8/19  Last Fill Quantity: 2,   # refills: 3  Last Office Visit: 8/13/19  Future Office visit:    Next 5 appointments (look out 90 days)    Sep 11, 2019  3:30 PM CDT  (Arrive by 3:15 PM)  Office Visit with Amberly Whyte NP  [facility] [place] ([facility] ) [address]  [place] [address]  [phone]

## 2019-09-03 NOTE — PROGRESS NOTES
Subjective     Marly Cannon is a 54 year old female who presents to clinic today for the following health issues:    HPI   Labial Cyst     Do recall, she was seen on 7/31/19 for a complete physical and found to have an erythematous, painful cyst on her labia. She was placed on cephalexin. She then went to the ER on 8/12 as she developed a second cyst. An I and D was done and she was switched to doxycycline. She then followed up with myself on 8/13 and was doing much better. Today she notes that the cyst has completely resolved. No erythema. No fevers. She feels much better.       Duration :noticed on 07-     Description (location/character/radiation): no current pain or discomfort, patient states the area has healed     Intensity:  0/10    Accompanying signs and symptoms: none     History (similar episodes/previous evaluation): yes , previous I& D in the urgent care     Precipitating or alleviating factors: None    Therapies tried and outcome: previous ABX        Patient Active Problem List   Diagnosis     Type 2 diabetes, HbA1c goal < 7% (H)     ACP (advance care planning)     Stage 3 chronic kidney disease (H)     Pulmonary emphysema (H)     Hyperparathyroidism due to renal insufficiency (H)     Morbid obesity (H)     Vitamin D deficiency     Intellectual disability     Breast fibrocystic disorder     Tobacco abuse     Microalbuminuria due to type 2 diabetes mellitus (H)     H/O colonoscopy     [place] cardiac risk <10% in next 10 years     Hypomagnesemia     Tubular adenoma of colon     Hyperlipidemia, unspecified hyperlipidemia type     Essential hypertension     Callus of foot     Past Surgical History:   Procedure Laterality Date     COLONOSCOPY N/A 8/20/2015    Procedure: COLONOSCOPY;  Surgeon: Gentry Porter MD;  Location: [facility]     COLONOSCOPY N/A 9/21/2018    Procedure: COLONOSCOPY;  COLONOSCOPY WITH POLYPECTOMY;  Surgeon: Gentry Porter MD;  Location: [facility] OR       Social History      Tobacco Use     Smoking status: Current Every Day Smoker     Packs/day: 1.00     Years: 34.00     Pack years: 34.00     Types: Cigarettes     Start date: 1/1/1979     Smokeless tobacco: Never Used     Tobacco comment: Declined QP 04/24/19   Substance Use Topics     Alcohol use: No     Alcohol/week: 0.0 oz     Family History   Problem Relation Age of Onset     Diabetes Mother      Diabetes Father      Hypertension Brother      Diabetes Sister          Current Outpatient Medications   Medication Sig Dispense Refill     Acetaminophen (TYLENOL PO) Take 325 mg by mouth every 6 hours as needed        ammonium lactate (LAC-HYDRIN) 12 % cream Apply topically 2 times daily       aspirin 81 MG EC tablet Take 1 tablet (81 mg) by mouth daily 90 tablet 3     atorvastatin (LIPITOR) 40 MG tablet Take 1 tablet (40 mg) by mouth daily 90 tablet 3     blood glucose (NO BRAND SPECIFIED) test strip Use to test blood sugar 4 times daily or as directed. 300 strip 11     budesonide-formoterol (SYMBICORT) 160-4.5 MCG/ACT Inhaler Inhale 2 puffs into the lungs 2 times daily 1 Inhaler 3     Cholecalciferol (VITAMIN D-3) 1000 units CAPS Take 2,000 Units by mouth daily 90 capsule 11     Cyclobenzaprine HCl (FLEXERIL PO) Take 10 mg by mouth 3 times daily as needed for muscle spasms       dulaglutide (TRULICITY) 1.5 MG/0.5ML pen Inject 1.5 mg Subcutaneous every 7 days 2 mL 3     hydrocortisone 2.5 % cream Apply topically 2 times daily       insulin aspart (NOVOLOG PEN) 100 UNIT/ML pen Inject 5 units if BG is greater than 100; inject 10 units if BG is greater than 200, inject 15 if BG is greater than 300.  TDD: 30 units 15 mL 3     insulin degludec (TRESIBA FLEXTOUCH) 200 UNIT/ML pen Inject 80 Units Subcutaneous daily 18 mL 3     insulin pen needle (32G X 4 MM) 32G X 4 MM miscellaneous Use 1 pen needles daily 100 each 3     Ipratropium-Albuterol (COMBIVENT RESPIMAT)  MCG/ACT inhaler Inhale 1 puff into the lungs 4 times daily        "lisinopril (PRINIVIL/ZESTRIL) 40 MG tablet Take 1 tablet (40 mg) by mouth daily 30 tablet 3     nystatin (MYCOSTATIN) 793963 UNIT/GM external powder Apply topically 3 times daily 60 g 3     ONETOUCH DELICA LANCETS 33G MISC 1 each 3 times daily 100 each 10     order for DME Women briefs 50 each 1     primidone (MYSOLINE) 50 MG tablet Take 1 tablet (50 mg) by mouth At Bedtime 30 tablet 0     SIMVASTATIN PO Take 20 mg by mouth At Bedtime        triamcinolone (KENALOG) 0.1 % cream Apply topically 2 times daily       No Known Allergies    Reviewed and updated as needed this visit by Provider         Review of Systems   ROS COMP: CONSTITUTIONAL: NEGATIVE for fever, chills, change in weight  INTEGUMENTARY/SKIN: NEGATIVE for worrisome rashes, moles or lesions  RESP: NEGATIVE for significant cough or SOB  CV: NEGATIVE for chest pain, palpitations or peripheral edema  GI: NEGATIVE for nausea, abdominal pain, heartburn, or change in bowel habits  PSYCHIATRIC: NEGATIVE for changes in mood or affect      Objective    /66 (BP Location: Left arm, Patient Position: Chair, Cuff Size: Adult Large)   Pulse 80   Temp 98.4  F (36.9  C) (Tympanic)   Ht 1.626 m (5' 4\")   Wt 95.8 kg (211 lb 3.2 oz)   SpO2 93%   BMI 36.25 kg/m    Body mass index is 36.25 kg/m .  Physical Exam   GENERAL: alert, no distress and obese  NECK: no adenopathy  RESP: lungs clear to auscultation - no rales, rhonchi or wheezes  CV: regular rate and rhythm, normal S1 S2, no S3 or S4, no murmur  ABDOMEN: soft, nontender, no hepatosplenomegaly, no masses and bowel sounds normal   (female): patient politely declined exam as she noted that everything has healed    Diagnostic Test Results:  none         Assessment & Plan   (L72.9,  L08.9) Infected cyst of skin  (primary encounter diagnosis)  Comment: patient notes that infection has healed  Plan: No further intervention necessary. F/u new or worsening symptoms.        Tobacco Cessation:   reports that she has " "been smoking cigarettes.  She started smoking about 40 years ago. She has a 34.00 pack-year smoking history. She has never used smokeless tobacco.  Tobacco Cessation Action Plan: Information offered: Patient not interested at this time      BMI:   Estimated body mass index is 36.06 kg/m  as calculated from the following:    Height as of 8/29/19: 1.626 m (5' 4\").    Weight as of 8/29/19: 95.3 kg (210 lb 1.6 oz).   Weight management plan: Discussed healthy diet and exercise guidelines      Amberly Whyte NP  [facility] [place]      "

## 2019-09-11 ENCOUNTER — OFFICE VISIT (OUTPATIENT)
Dept: FAMILY MEDICINE | Facility: OTHER | Age: 54
End: 2019-09-11
Attending: NURSE PRACTITIONER
Payer: MEDICARE

## 2019-09-11 VITALS
HEART RATE: 80 BPM | OXYGEN SATURATION: 93 % | HEIGHT: 64 IN | WEIGHT: 211.2 LBS | DIASTOLIC BLOOD PRESSURE: 66 MMHG | SYSTOLIC BLOOD PRESSURE: 106 MMHG | BODY MASS INDEX: 36.06 KG/M2 | TEMPERATURE: 98.4 F

## 2019-09-11 DIAGNOSIS — I10 ESSENTIAL HYPERTENSION: Primary | ICD-10-CM

## 2019-09-11 DIAGNOSIS — G25.0 ESSENTIAL TREMOR: ICD-10-CM

## 2019-09-11 DIAGNOSIS — L08.9 INFECTED CYST OF SKIN: Primary | ICD-10-CM

## 2019-09-11 DIAGNOSIS — L72.9 INFECTED CYST OF SKIN: Primary | ICD-10-CM

## 2019-09-11 PROCEDURE — 99212 OFFICE O/P EST SF 10 MIN: CPT | Performed by: NURSE PRACTITIONER

## 2019-09-11 PROCEDURE — G0463 HOSPITAL OUTPT CLINIC VISIT: HCPCS

## 2019-09-11 RX ORDER — PRIMIDONE 50 MG/1
50 TABLET ORAL AT BEDTIME
Qty: 30 TABLET | Refills: 0 | Status: SHIPPED | OUTPATIENT
Start: 2019-09-11 | End: 2019-10-22

## 2019-09-11 RX ORDER — LISINOPRIL 40 MG/1
40 TABLET ORAL DAILY
Qty: 30 TABLET | Refills: 3 | Status: SHIPPED | OUTPATIENT
Start: 2019-09-11 | End: 2019-12-20

## 2019-09-11 ASSESSMENT — MIFFLIN-ST. JEOR: SCORE: 1533

## 2019-09-11 ASSESSMENT — PAIN SCALES - GENERAL: PAINLEVEL: NO PAIN (0)

## 2019-09-11 NOTE — NURSING NOTE
"Chief Complaint   Patient presents with     Derm Problem     f/u labial cyst        Initial /66 (BP Location: Left arm, Patient Position: Chair, Cuff Size: Adult Large)   Pulse 80   Temp 98.4  F (36.9  C) (Tympanic)   Ht 1.626 m (5' 4\")   Wt 95.8 kg (211 lb 3.2 oz)   SpO2 93%   BMI 36.25 kg/m   Estimated body mass index is 36.25 kg/m  as calculated from the following:    Height as of this encounter: 1.626 m (5' 4\").    Weight as of this encounter: 95.8 kg (211 lb 3.2 oz).  Medication Reconciliation: complete  "

## 2019-09-11 NOTE — TELEPHONE ENCOUNTER
5-09-19  Historical medication    Last Written Prescription Date:  05-09-19  Last Fill Quantity: 30,   # refills: 0 Last Office Visit:08-   Future Office visit:  9-  Next 5 appointments (look out 90 days)    Sep 11, 2019  3:30 PM CDT  (Arrive by 3:15 PM)  Office Visit with Amberly Whyte NP  [facility] ([facility] ) [address]  [place] [address]  [phone]           Routing refill request to provider for review/approval because:  Medication is reported/historical      Primidone 50 mg    Last Written Prescription Date:  08-  Last Fill Quantity: 30,   # refills: 0  Last Office Visit: 08-  Future Office visit:  09-  Next 5 appointments (look out 90 days)    Sep 11, 2019  3:30 PM CDT  (Arrive by 3:15 PM)  [facility] [address]  [place] [address]  [phone]           Routing refill request to provider for review/approval because:

## 2019-09-12 ENCOUNTER — TELEPHONE (OUTPATIENT)
Dept: EDUCATION SERVICES | Facility: HOSPITAL | Age: 54
End: 2019-09-12

## 2019-09-12 DIAGNOSIS — Z79.4 TYPE 2 DIABETES MELLITUS WITH HYPERGLYCEMIA, WITH LONG-TERM CURRENT USE OF INSULIN (H): Primary | ICD-10-CM

## 2019-09-12 DIAGNOSIS — E11.65 TYPE 2 DIABETES MELLITUS WITH HYPERGLYCEMIA, WITH LONG-TERM CURRENT USE OF INSULIN (H): Primary | ICD-10-CM

## 2019-09-13 DIAGNOSIS — I10 ESSENTIAL HYPERTENSION: Primary | ICD-10-CM

## 2019-09-13 RX ORDER — HYDROCHLOROTHIAZIDE 12.5 MG/1
25 TABLET ORAL DAILY
Qty: 90 TABLET | Refills: 3 | Status: SHIPPED | OUTPATIENT
Start: 2019-09-13 | End: 2019-09-13

## 2019-09-13 NOTE — TELEPHONE ENCOUNTER
HCTZ    12.5mg 1 tab daily  Last Written Prescription Date:  08/05/19 per pharmacy  Last Fill Quantity: 30 per pharmacy,   # refills: unknown  Last Office Visit: 09/11/19  Future Office visit:    Next 5 appointments (look out 90 days)    Dec 04, 2019  2:10 PM CST  (Arrive by 1:55 PM)  Office Visit with Amberly Whyte NP  [facility] ([facility] ) [address]  [place] [address]  [phone]           This is not on the pt's med list and is not in med hx. No appt or phone log seen for this date.

## 2019-09-17 ENCOUNTER — PATIENT OUTREACH (OUTPATIENT)
Dept: CARE COORDINATION | Facility: OTHER | Age: 54
End: 2019-09-17

## 2019-09-17 ASSESSMENT — ACTIVITIES OF DAILY LIVING (ADL): DEPENDENT_IADLS:: INDEPENDENT

## 2019-09-17 NOTE — PROGRESS NOTES
Clinic Care Coordination Contact  Care Team Conversations    Incoming call from pt's home care RN Nikole.  She reported on the voicemail message that she was at Marly's home and Marly had given her a note indicating she needed to stop her hydrochlorothiazide and another med.  She is calling to clarify this.      Writer called Nikole back and left her a secure voicemail indicating there has been no mention of these medication changes by her PCP.  Checked into Care Everywhere and we need an updated authorization signed.  Also checked [facility]'s Epic system and noted she missed her nephrology appt 8/28 so they would not have made these changes.  Also noted she was referred in August to neuropsych but nothing is set up.  Unable to tell if they have tried to reach the patient.      CC and home care nurse secure message conversation: Nikole asked if we would reschedule this appointment with nephrology.  Writer questioned what the current care plan is for appointment management.  Advised CC would think her [facility] worker would be able to assist with appointments, keeping a calendar, and setting up transportation.  Home care should be taking the lead on the case so would also want to keep track.  Care coordination with PCP will also assist from the clinic standpoint to make sure Marly is scheduled here for all necessary appointments as well as assure she has a printed AVS after each appointment with a list of upcoming appts.  If it would be helpful, we could fax this list to her [facility] worker.  Nikole does have access to Epic but we could also fax to her if Marly isn't providing this to her when she does her home visits.  CC also advised if [facility] is struggling to help Marly with these tasks or if Marly is refusing, then we do need to report non-compliance with medical appointments to her [facility] .      Will await response from Nikole to determine what will be needed moving forward.  It may be an issue where  there are too many parties involved and each is assuming the other is making sure Marly knows what she is supposed to be doing.  Defining clear roles will be helpful to prevent further confusion.  If non-compliance is the main issue then this needs to be reported to her county  so they can evaluate the services she is receiving.    As far as the note Marly gave to Nikole this morning indicating she needs to make medication adjustments, we do not have any idea where this would have come from.    Lauren Pipkin, BS-RN   CHF and General Care Coordinator  [phone] Option # 1

## 2019-09-26 ENCOUNTER — PATIENT OUTREACH (OUTPATIENT)
Dept: CARE COORDINATION | Facility: OTHER | Age: 54
End: 2019-09-26

## 2019-09-26 ENCOUNTER — ALLIED HEALTH/NURSE VISIT (OUTPATIENT)
Dept: EDUCATION SERVICES | Facility: OTHER | Age: 54
End: 2019-09-26
Attending: NURSE PRACTITIONER
Payer: MEDICARE

## 2019-09-26 VITALS
OXYGEN SATURATION: 95 % | SYSTOLIC BLOOD PRESSURE: 116 MMHG | HEIGHT: 64 IN | HEART RATE: 77 BPM | WEIGHT: 210.6 LBS | RESPIRATION RATE: 16 BRPM | DIASTOLIC BLOOD PRESSURE: 80 MMHG | BODY MASS INDEX: 35.96 KG/M2

## 2019-09-26 DIAGNOSIS — Z71.6 TOBACCO ABUSE COUNSELING: ICD-10-CM

## 2019-09-26 DIAGNOSIS — Z79.4 TYPE 2 DIABETES MELLITUS WITH HYPERGLYCEMIA, WITH LONG-TERM CURRENT USE OF INSULIN (H): Primary | ICD-10-CM

## 2019-09-26 DIAGNOSIS — E11.65 TYPE 2 DIABETES MELLITUS WITH HYPERGLYCEMIA, WITH LONG-TERM CURRENT USE OF INSULIN (H): Primary | ICD-10-CM

## 2019-09-26 DIAGNOSIS — Z72.0 TOBACCO ABUSE: ICD-10-CM

## 2019-09-26 DIAGNOSIS — E11.9 TYPE 2 DIABETES, HBA1C GOAL < 7% (H): ICD-10-CM

## 2019-09-26 PROCEDURE — G0463 HOSPITAL OUTPT CLINIC VISIT: HCPCS

## 2019-09-26 PROCEDURE — 99213 OFFICE O/P EST LOW 20 MIN: CPT | Performed by: NURSE PRACTITIONER

## 2019-09-26 ASSESSMENT — PATIENT HEALTH QUESTIONNAIRE - PHQ9: SUM OF ALL RESPONSES TO PHQ QUESTIONS 1-9: 5

## 2019-09-26 ASSESSMENT — PAIN SCALES - GENERAL: PAINLEVEL: NO PAIN (0)

## 2019-09-26 ASSESSMENT — MIFFLIN-ST. JEOR: SCORE: 1530.28

## 2019-09-26 NOTE — PATIENT INSTRUCTIONS
Continue working on healthy eating and moving as best as you can (start low and slow, work up to 30 min, 5x/week)    BG goals:  Fasting and before meals <130, >70  2 hour after eating <180    We only need 1/2 of these numbers to be within target then your A1c will be within target    Diabetes Medications   STOP Trulicity  1. Starting next Tuesday (10/1/19)  Ozempic 0.25 mg every Tuesday    2.  Novolog before meals  If BG >70 before meals: 5 units  BG >200: 10 units  BG >300: 12 units    3.  Tresiba  80 units daily in the outer thigh    Follow up  End of October (week of the 21st)    Call me sooner if any problems/concerns and/or questions develop including consistent low BGs <70 or consistent high BGs >200  [phone] (Unit Coordinator)    [phone] (Nurse)    HOW TO QUIT SMOKING  Smoking is one of the hardest habits to break. About half of all those who have ever smoked have been able to quit, and most of those (about 70%) who still smoke want to quit. Here are some of the best ways to stop smoking.     KEEP TRYING:  It takes most smokers about 8 tries before they are finally able to fully quit. So, the more often you try and fail, the better your chance of quitting the next time! So, don't give up!    GO COLD TURKEY:  Most ex-smokers quit cold turkey. Trying to cut back gradually doesn't seem to work as well, perhaps because it continues the smoking habit. Also, it is possible to fool yourself by inhaling more while smoking fewer cigarettes. This results in the same amount of nicotine in your body!    GET SUPPORT:  Support programs can make an important difference, especially for the heavy smoker. These groups offer lectures, methods to change your behavior and peer support. Call the free national Quitline for more information. 800-QUIT-NOW (028-034-8868). Low-cost or free programs are offered by many hospitals, local chapters of the American Lung Association (087-993-7667) and the American Cancer Society  (490.719.1951). Support at home is important too. Non-smokers can help by offering praise and encouragement. If the smoker fails to quit, encourage them to try again!    OVER-THE-COUNTER MEDICINES:  For those who can't quit on their own, Nicotine Replacement Therapy (NRT) may make quitting much easier. Certain aids such as the nicotine patch, gum and lozenge are available without a prescription. However, it is best to use these under the guidance of your doctor. The skin patch provides a steady supply of nicotine to the body. Nicotine gum and lozenge gives temporary bursts of low levels of nicotine. Both methods take the edge off the craving for cigarettes. WARNING: If you feel symptoms of nicotine overdose, such as nausea, vomiting, dizziness, weakness, or fast heartbeat, stop using these and see your doctor.    PRESCRIPTION MEDICINES:  After evaluating your smoking patterns and prior attempts at quitting, your doctor may offer a prescription medicine such as bupropion (Zyban, Wellbutrin), varenicline (Chantix, Champix), a niocotine inhaler or nasal spray. Each has its unique advantage and side effects which your doctor can review with you.    HEALTH BENEFITS OF QUITTING:  The benefits of quitting start right away and keep improving the longer you go without smokin minutes: blood pressure and pulse return to normal  8 hours: oxygen levels return to normal  2 days: ability to smell and taste begins to improve as damaged nerves start to regrow  2-3 weeks: circulation and lung function improves  1-9 months: decreased cough, congestion and shortness of breath; less tired  1 year: risk of heart attack decreases by half  5 years: risk of lung cancer decreases by half; risk of stroke becomes the same as a non-smoker  For information about how to quit smoking, visit the following links:  National [employer] ,   Clearing the Air, Quit Smoking Today   - an online booklet.  http://www.smokefree.gov/pubs/clearing_the_air.pdf  Smokefree.gov http://smokefree.gov/  QuitNet http://www.quitnet.com/    9703-9457 Elisa Munson, [address] [place] [address], [place] [address]. All rights reserved. This information is not intended as a substitute for professional medical care. Always follow your healthcare professional's instructions.    The Benefits of Living Smoke Free  What do you want to gain from quitting? Check off some reasons to quit.  Health Benefits  ___ Reduce my risk of lung cancer, heart disease, chronic lung disease  ___ Have fewer wrinkles and softer skin  ___ Improve my sense of taste and smell  ___ For pregnant women--reduce the risk of having a miscarriage, stillbirth, premature birth, or low-birth-weight baby  Personal Benefits  ___ Feel more in control of my life  ___ Have better-smelling hair, breath, clothes, home, and car  ___ Save time by not having to take smoke breaks, buy cigarettes, or hunt for a light  ___ Have whiter teeth  Family Benefits  ___ Reduce my children s respiratory tract infections  ___ Set a good example for my children  ___ Reduce my family s cancer risk  Financial Benefits  ___ Save hundreds of dollars each year that would be spent on cigarettes  ___ Save money on medical bills  ___ Save on life, health, and car insurance premiums    Those Dollars Add Up!  Cigarettes are expensive, and getting more expensive all the time. Do you realize how much money you are spending on cigarettes per year? What is the average amount you spend on a pack of cigarettes? What is the average number of packs that you smoke per day? Using your answers to these questions, fill in this formula to help you find out:  ($ _____ per pack) ×  ( _____ number of packs per day) × (365 days) =  $ _____ yearly cost of smoking  Besides tobacco, there are other costs, including extra cleaning bills and replacement costs for clothing and furniture; medical expenses for smoking-related  illnesses; and higher health, life, and car insurance premiums.    Cigars and Pipes Count Too!  Cigars and pipes are also dangerous. So are smokeless (chewing) tobacco and snuff. All of these products contain nicotine, a highly addictive substance that has harmful effects on your body. Quitting smoking means giving up all tobacco products.      6192-8642 Elisa Munson, [address] [place] [address], [place] [address]. All rights reserved. This information is not intended as a substitute for professional medical care. Always follow your healthcare professional's instructions.

## 2019-09-26 NOTE — PROGRESS NOTES
"Chief Complaint   Patient presents with     Diabetes       Initial /80   Pulse 77   Resp 16   Ht 1.626 m (5' 4\")   Wt 95.5 kg (210 lb 9.6 oz)   SpO2 95%   BMI 36.15 kg/m   Estimated body mass index is 36.15 kg/m  as calculated from the following:    Height as of this encounter: 1.626 m (5' 4\").    Weight as of this encounter: 95.5 kg (210 lb 9.6 oz).  Medication Reconciliation: complete  Gege Walter LPN    "

## 2019-09-26 NOTE — PROGRESS NOTES
Amberly Whyte, NP Pipkin, Lauren N, RN   Caller: Unspecified (1 week ago)             Thanks so much for looking into this, yes the hydrochlorothiazide should be stopped. She is currently taking atorvastatin  so she should not be taking simvastatin. You are right, this is difficult to manage because Marly does not know what she is taking. Please make sure she is not taking the simvastatin.

## 2019-09-26 NOTE — PROGRESS NOTES
SUBJECTIVE:  Marly Cannon, 54 year old, female presents with the following Chief Complaint(s) with HPI to follow:  Chief Complaint   Patient presents with     Diabetes        Diabetes Follow-up    Patient is checking blood sugars: 2.4x/day.  Results:  Highest: 360  Lowest: 74  Period average: 235 (was 333)    Values above, >180: 32  Values within goal (): 2  Values below goal, <70: 0        Symptoms of hypoglycemia (low blood sugar): none    Paresthesias (numbness or burning in feet) or sores: No    Diabetic eye exam within the last year: Yes    Breakfast eaten regularly: once in awhile     Patient counting carbs: Yes    Walking: almost every day when it's nice       HPI:  Marly's here today for the follow up regarding her Diabetes mellitus, Type 2.    Lab Results   Component Value Date    A1C 12.0 08/29/2019    A1C 11.0 05/29/2019    A1C 9.8 02/22/2019    A1C 10.9 11/21/2018    A1C 9.6 08/21/2018    A1C 9.6 08/21/2018     Current Diabetes medication:   1.  Tresiba 80  units daily @ bedtime  2.  Trulicity 1.5 mg weekly with her nurse (Tuesday)  3.  Novolog--before meals  >100: 5 units  >200: 10 units  >300: 12 units--reports no missed doses  ASA use: yes, 81 mg daily  Statin use: yes, simvastatin 20 mg daily    Marly's here today for a meter download and possible insulin adjustment.    Marly continues to deny any missed doses of her diabetic medication.      Denies any new health concerns.        Josie KABA, RN Care Coordinator in today's appt      Patient Active Problem List   Diagnosis     Type 2 diabetes, HbA1c goal < 7% (H)     ACP (advance care planning)     Stage 3 chronic kidney disease (H)     Pulmonary emphysema (H)     Hyperparathyroidism due to renal insufficiency (H)     Morbid obesity (H)     Vitamin D deficiency     Intellectual disability     Breast fibrocystic disorder     Tobacco abuse     Microalbuminuria due to type 2 diabetes mellitus (H)     H/O colonoscopy     [place] cardiac  risk <10% in next 10 years     Hypomagnesemia     Tubular adenoma of colon     Hyperlipidemia, unspecified hyperlipidemia type     Essential hypertension     Callus of foot       No past medical history on file.    Past Surgical History:   Procedure Laterality Date     COLONOSCOPY N/A 8/20/2015    Procedure: COLONOSCOPY;  Surgeon: Gentry Porter MD;  Location: [facility]     COLONOSCOPY N/A 9/21/2018    Procedure: COLONOSCOPY;  COLONOSCOPY WITH POLYPECTOMY;  Surgeon: Gentry Porter MD;  Location: [facility] OR       Family History   Problem Relation Age of Onset     Diabetes Mother      Diabetes Father      Hypertension Brother      Diabetes Sister        Social History     Tobacco Use     Smoking status: Current Every Day Smoker     Packs/day: 1.00     Years: 34.00     Pack years: 34.00     Types: Cigarettes     Start date: 1/1/1979     Smokeless tobacco: Never Used     Tobacco comment: Declined QP 10/2/19   Substance Use Topics     Alcohol use: No     Alcohol/week: 0.0 standard drinks       Current Outpatient Medications   Medication Sig Dispense Refill     Acetaminophen (TYLENOL PO) Take 325 mg by mouth every 6 hours as needed        ammonium lactate (LAC-HYDRIN) 12 % cream Apply topically 2 times daily       aspirin 81 MG EC tablet Take 1 tablet (81 mg) by mouth daily 90 tablet 3     atorvastatin (LIPITOR) 40 MG tablet Take 1 tablet (40 mg) by mouth daily 90 tablet 3     blood glucose (NO BRAND SPECIFIED) test strip Use to test blood sugar 4 times daily or as directed. 300 strip 11     budesonide-formoterol (SYMBICORT) 160-4.5 MCG/ACT Inhaler Inhale 2 puffs into the lungs 2 times daily 1 Inhaler 3     Cholecalciferol (VITAMIN D-3) 1000 units CAPS Take 2,000 Units by mouth daily 90 capsule 11     Cyclobenzaprine HCl (FLEXERIL PO) Take 10 mg by mouth 3 times daily as needed for muscle spasms       dulaglutide (TRULICITY) 1.5 MG/0.5ML pen Inject 1.5 mg Subcutaneous every 7 days 2 mL 3     hydrocortisone 2.5 % cream  "Apply topically 2 times daily       insulin aspart (NOVOLOG PEN) 100 UNIT/ML pen Inject 5 units if BG is greater than 100; inject 10 units if BG is greater than 200, inject 15 if BG is greater than 300.  TDD: 30 units 15 mL 3     insulin degludec (TRESIBA FLEXTOUCH) 200 UNIT/ML pen Inject 80 Units Subcutaneous daily 18 mL 3     insulin pen needle (32G X 4 MM) 32G X 4 MM miscellaneous Use 1 pen needles daily 100 each 3     Ipratropium-Albuterol (COMBIVENT RESPIMAT)  MCG/ACT inhaler Inhale 1 puff into the lungs 4 times daily       lisinopril (PRINIVIL/ZESTRIL) 40 MG tablet Take 1 tablet (40 mg) by mouth daily 30 tablet 3     nystatin (MYCOSTATIN) 745621 UNIT/GM external powder Apply topically 3 times daily 60 g 3     ONETOUCH DELICA LANCETS 33G MISC 1 each 3 times daily 100 each 10     order for DME Women briefs 50 each 1     primidone (MYSOLINE) 50 MG tablet Take 1 tablet (50 mg) by mouth At Bedtime 30 tablet 0     triamcinolone (KENALOG) 0.1 % cream Apply topically 2 times daily       ofloxacin (FLOXIN) 0.3 % otic solution Place 10 drops into the right ear daily for 7 days 1 Bottle 0       No Known Allergies    REVIEW OF SYSTEMS  Skin: negative  Eyes: negative   Ears/Nose/Throat: glasses; negative  Respiratory: No shortness of breath, cough, or hemoptysis;   Cardiovascular: negative  Gastrointestinal: negative  Genitourinary: occasionally with high BG  Musculoskeletal: negative; every Monday ([facility]    Neurologic: negative  Psychiatric: negative  Hematologic/Lymphatic/Immunologic: continued  Endocrine: positive for diabetes; sweaty spells--none lately     OBJECTIVE:  /80   Pulse 77   Resp 16   Ht 1.626 m (5' 4\")   Wt 95.5 kg (210 lb 9.6 oz)   SpO2 95%   BMI 36.15 kg/m    Constitutional: alert and no distress  Musculoskeletal: extremities normal- no gross deformities noted and gait normal  Psychiatric: mentation appears normal for baseline and affect " normal/bright    LABS  Results for orders placed or performed during the hospital encounter of 08/29/19   Hemoglobin A1c   Result Value Ref Range    Hemoglobin A1C 12.0 (A) <5.7 %       ASSESSMENT / PLAN:  (E11.9) Type 2 diabetes mellitus (H)  (primary encounter diagnosis)  Comment: noted  BGs remain above target even with increased insulin doses  Plan: ENDOCRINOLOGY ADULT REFERRAL    Will trial a new GLP-1 (Ozempic)  Continue the same Tresiba dose  Novolog as a correction          (E11.9) Type 2 diabetes, HbA1c goal < 7% (H)  Comment: noted  Plan:   As stated above    (Z72.0) Tobacco abuse  Comment: noted and refused  Plan: Tobacco Cessation - Order to Satisfy Health         Maintenance          Marly's aware to let us know when she's ready to quit smoking.  Discussed the dangers of smoking with diabetes      (Z71.6) Tobacco abuse counseling  Comment: noted  Plan: Tobacco Cessation - Order to Satisfy Health         Maintenance          As mentioned above    Follow up in 3 weeks  Sooner if needed    Patient Instructions   Continue working on healthy eating and moving as best as you can (start low and slow, work up to 30 min, 5x/week)    BG goals:  Fasting and before meals <130, >70  2 hour after eating <180    We only need 1/2 of these numbers to be within target then your A1c will be within target    Diabetes Medications   STOP Trulicity  1. Starting next Tuesday (10/1/19)  Ozempic 0.25 mg every Tuesday    2.  Novolog before meals  If BG >70 before meals: 5 units  BG >200: 10 units  BG >300: 12 units    3.  Tresiba  80 units daily in the outer thigh    Follow up  End of October (week of the 21st)    Call me sooner if any problems/concerns and/or questions develop including consistent low BGs <70 or consistent high BGs >200  [phone] (Unit Coordinator)    [phone] (Nurse)    HOW TO QUIT SMOKING  Smoking is one of the hardest habits to break. About half of all those who have ever smoked have been able to  quit, and most of those (about 70%) who still smoke want to quit. Here are some of the best ways to stop smoking.     KEEP TRYING:  It takes most smokers about 8 tries before they are finally able to fully quit. So, the more often you try and fail, the better your chance of quitting the next time! So, don't give up!    GO COLD TURKEY:  Most ex-smokers quit cold turkey. Trying to cut back gradually doesn't seem to work as well, perhaps because it continues the smoking habit. Also, it is possible to fool yourself by inhaling more while smoking fewer cigarettes. This results in the same amount of nicotine in your body!    GET SUPPORT:  Support programs can make an important difference, especially for the heavy smoker. These groups offer lectures, methods to change your behavior and peer support. Call the free national Quitline for more information. 800-QUIT-NOW (725-744-3079). Low-cost or free programs are offered by many hospitals, local chapters of the American Lung Association (292-632-9985) and the American Cancer Society (711-434-2232). Support at home is important too. Non-smokers can help by offering praise and encouragement. If the smoker fails to quit, encourage them to try again!    OVER-THE-COUNTER MEDICINES:  For those who can't quit on their own, Nicotine Replacement Therapy (NRT) may make quitting much easier. Certain aids such as the nicotine patch, gum and lozenge are available without a prescription. However, it is best to use these under the guidance of your doctor. The skin patch provides a steady supply of nicotine to the body. Nicotine gum and lozenge gives temporary bursts of low levels of nicotine. Both methods take the edge off the craving for cigarettes. WARNING: If you feel symptoms of nicotine overdose, such as nausea, vomiting, dizziness, weakness, or fast heartbeat, stop using these and see your doctor.    PRESCRIPTION MEDICINES:  After evaluating your smoking patterns and prior attempts at  quitting, your doctor may offer a prescription medicine such as bupropion (Zyban, Wellbutrin), varenicline (Chantix, Champix), a niocotine inhaler or nasal spray. Each has its unique advantage and side effects which your doctor can review with you.    HEALTH BENEFITS OF QUITTING:  The benefits of quitting start right away and keep improving the longer you go without smokin minutes: blood pressure and pulse return to normal  8 hours: oxygen levels return to normal  2 days: ability to smell and taste begins to improve as damaged nerves start to regrow  2-3 weeks: circulation and lung function improves  1-9 months: decreased cough, congestion and shortness of breath; less tired  1 year: risk of heart attack decreases by half  5 years: risk of lung cancer decreases by half; risk of stroke becomes the same as a non-smoker  For information about how to quit smoking, visit the following links:  National [employer] [place] ,   Clearing the Air, Quit Smoking Today   - an online booklet. http://www.smokefree.gov/pubs/clearing_the_air.pdf  Smokefree.gov http://smokefree.gov/  QuitNet http://www.quitnet.com/    5885-5200 [facility] [facility], [address] [place] [address]. All rights reserved. This information is not intended as a substitute for professional medical care. Always follow your healthcare professional's instructions.    The Benefits of Living Smoke Free  What do you want to gain from quitting? Check off some reasons to quit.  Health Benefits  ___ Reduce my risk of lung cancer, heart disease, chronic lung disease  ___ Have fewer wrinkles and softer skin  ___ Improve my sense of taste and smell  ___ For pregnant women--reduce the risk of having a miscarriage, stillbirth, premature birth, or low-birth-weight baby  Personal Benefits  ___ Feel more in control of my life  ___ Have better-smelling hair, breath, clothes, home, and car  ___ Save time by not having to take smoke breaks, buy cigarettes, or hunt for  a light  ___ Have whiter teeth  Family Benefits  ___ Reduce my children s respiratory tract infections  ___ Set a good example for my children  ___ Reduce my family s cancer risk  Financial Benefits  ___ Save hundreds of dollars each year that would be spent on cigarettes  ___ Save money on medical bills  ___ Save on life, health, and car insurance premiums    Those Dollars Add Up!  Cigarettes are expensive, and getting more expensive all the time. Do you realize how much money you are spending on cigarettes per year? What is the average amount you spend on a pack of cigarettes? What is the average number of packs that you smoke per day? Using your answers to these questions, fill in this formula to help you find out:  ($ _____ per pack) ×  ( _____ number of packs per day) × (365 days) =  $ _____ yearly cost of smoking  Besides tobacco, there are other costs, including extra cleaning bills and replacement costs for clothing and furniture; medical expenses for smoking-related illnesses; and higher health, life, and car insurance premiums.    Cigars and Pipes Count Too!  Cigars and pipes are also dangerous. So are smokeless (chewing) tobacco and snuff. All of these products contain nicotine, a highly addictive substance that has harmful effects on your body. Quitting smoking means giving up all tobacco products.      2267-2355 [facility], [address] [place] [address]. All rights reserved. This information is not intended as a substitute for professional medical care. Always follow your healthcare professional's instructions.    Time: 35 minutes  Barrier: see [facility]  Willingness to learn: accepting    Courtney PINTO FN-BC  Disease Management    Cc: Amberly Whyte NP    With the electronic record, we can now more quickly and easily track our patient diabetic goals. Our diabetes clinical review is in progress and these are the indicators we are monitoring for good diabetes health.     1.) HbA1C less  than 7 (measurement of your average blood sugars)  2.) Blood Pressure less than 140/80  3.) LDL less than 100 (bad cholesterol)  4.) HbA1C is checked in the last 6 months and below 7% (more frequently if not at goal or adjusting medications)  5.) LDL is checked in the last 12 months (more frequently if not at goal or adjusting medications)  6.) Taking one baby aspirin daily (unless otherwise instructed)  7.) No tobacco use  8) Statin use     You have achieved 6 out of 8 of these and I am encouraging you to come in and get tuned up to achieve 8 out of 8!  Here is what you have achieved so far in my goals for you:  1.) HbA1C  less than 7:                              NO  Your last  HbA1C :  Lab Results   Component Value Date    A1C 11.0 05/29/2019    A1C 9.8 02/22/2019    A1C 10.9 11/21/2018    A1C 9.6 08/21/2018    A1C 9.6 08/21/2018     2.) Blood Pressure less than 140/80:       YES      Your last    BP Readings from Last 1 Encounters:   10/02/19 110/86     3.) LDL less than 100:                              YES      Your last     LDL Cholesterol Calculated   Date Value Ref Range Status   03/29/2019 72 <100 mg/dL Final       4.) Checked HbA1C in the past 6 months: YES      5.) Checked LDL in the past 12 months:    YES      6.) Taking one aspirin daily:                       YES     7.) No tobacco use:                                        NO   8.) Statin use      YES

## 2019-09-27 ASSESSMENT — ACTIVITIES OF DAILY LIVING (ADL): DEPENDENT_IADLS:: INDEPENDENT

## 2019-09-27 NOTE — PROGRESS NOTES
Clinic Care Coordination Contact  Care Team Conversations    Care coordinator attended office visit with pt and Courtney Chaudhary NP this date.  Please refer to provider progress note for updates to patient's plan of care.    *Phone call placed to [facility] and the following orders were given to Wendy with readback so their medical record can be updated:  -Pt to discontinue Trulicity and start Ozempic 0.25mg every Tuesday (sample provided to patient).  Home care RN to watch pt administer every week to assure compliance.  -Reviewed Novolog sliding scale  Novolog before meals  If BG >70 before meals: 5 units  BG >200: 10 units  BG >300: 12 units  -If not already done, assure pt has discontinued hydrochlorothiazide  -Pt should NOT be taking simvastatin  Is on atorvastatin    Advised Wendy that the patient has a neuropsych appointment with Dr Cristobal on 11/25/19 in [place].  Will have to confirm pt has a plan for transportation to this appointment.     She needs to see Dr Eason at [facility] (nephrology) as she missed the appointment she had previously scheduled for this.  We are waiting for the [facility] to call back with an appointment date/time.     She also needs to see endocrinology.  Referral done today by Courtney Chaudhary NP for her to be seen for her diabetes.  A previous endocrinology referral was placed for thyroid issue but this is no longer needed and has been closed per provider.  Will ask EMILY Fernandez to assist with getting this set up for Marly through our referral process.  Prefer Dr Carter with [facility] as opposed to sending her to [place].  Writer will update home care RN with all upcoming appointments so she can make sure pt has these on her calendar.       Encouraged pt to call care coordinator with any questions/concerns/problems. Verbalized understanding.    Lauren Pipkin, BS-RN   CHF and General Care Coordinator  [phone] Option # 1

## 2019-10-02 ENCOUNTER — PATIENT OUTREACH (OUTPATIENT)
Dept: CARE COORDINATION | Facility: OTHER | Age: 54
End: 2019-10-02

## 2019-10-02 ENCOUNTER — OFFICE VISIT (OUTPATIENT)
Dept: OTOLARYNGOLOGY | Facility: OTHER | Age: 54
End: 2019-10-02
Attending: NURSE PRACTITIONER
Payer: MEDICARE

## 2019-10-02 VITALS
OXYGEN SATURATION: 95 % | BODY MASS INDEX: 35.85 KG/M2 | WEIGHT: 210 LBS | HEART RATE: 74 BPM | HEIGHT: 64 IN | SYSTOLIC BLOOD PRESSURE: 110 MMHG | TEMPERATURE: 98.1 F | DIASTOLIC BLOOD PRESSURE: 86 MMHG

## 2019-10-02 DIAGNOSIS — H61.91 LESION OF EXTERNAL EAR CANAL, RIGHT: Primary | ICD-10-CM

## 2019-10-02 DIAGNOSIS — Z71.6 TOBACCO ABUSE COUNSELING: ICD-10-CM

## 2019-10-02 PROCEDURE — 11441 EXC FACE-MM B9+MARG 0.6-1 CM: CPT | Performed by: NURSE PRACTITIONER

## 2019-10-02 PROCEDURE — 88305 TISSUE EXAM BY PATHOLOGIST: CPT | Mod: TC | Performed by: NURSE PRACTITIONER

## 2019-10-02 PROCEDURE — 99213 OFFICE O/P EST LOW 20 MIN: CPT | Mod: 25 | Performed by: NURSE PRACTITIONER

## 2019-10-02 PROCEDURE — G0463 HOSPITAL OUTPT CLINIC VISIT: HCPCS

## 2019-10-02 RX ORDER — OFLOXACIN 3 MG/ML
10 SOLUTION AURICULAR (OTIC) DAILY
Qty: 1 BOTTLE | Refills: 0 | Status: SHIPPED | OUTPATIENT
Start: 2019-10-02 | End: 2019-10-23

## 2019-10-02 ASSESSMENT — MIFFLIN-ST. JEOR: SCORE: 1527.55

## 2019-10-02 ASSESSMENT — ACTIVITIES OF DAILY LIVING (ADL): DEPENDENT_IADLS:: INDEPENDENT

## 2019-10-02 ASSESSMENT — PAIN SCALES - GENERAL: PAINLEVEL: NO PAIN (0)

## 2019-10-02 NOTE — LETTER
10/2/2019         RE: Marly Cannon  [address]  [place] [address]        Dear Colleague,    Thank you for referring your patient, Marly Cannon, to the [facility]. Please see a copy of my visit note below.    Otolaryngology Note         Chief Complaint:     Patient presents with:  Referral: Amberly Whyte Referral  Growth of right ear canal         History of Present Illness:     Marly Cannon is a 54 year old female seen today for concerns regarding a growth in her right ear canal. She noticed this when she put her finger in ear within the last few months. Never had this before. Has not noticed it growing/changing since she first noticed it. Denies pain, drainage, bleeding, scabbing. No known trauma to area.   No personal history of skin cancer.  No COM or otologic surgeries.  No hearing loss.          Medications:     Current Outpatient Rx   Medication Sig Dispense Refill     Acetaminophen (TYLENOL PO) Take 325 mg by mouth every 6 hours as needed        ammonium lactate (LAC-HYDRIN) 12 % cream Apply topically 2 times daily       aspirin 81 MG EC tablet Take 1 tablet (81 mg) by mouth daily 90 tablet 3     atorvastatin (LIPITOR) 40 MG tablet Take 1 tablet (40 mg) by mouth daily 90 tablet 3     blood glucose (NO BRAND SPECIFIED) test strip Use to test blood sugar 4 times daily or as directed. 300 strip 11     budesonide-formoterol (SYMBICORT) 160-4.5 MCG/ACT Inhaler Inhale 2 puffs into the lungs 2 times daily 1 Inhaler 3     Cholecalciferol (VITAMIN D-3) 1000 units CAPS Take 2,000 Units by mouth daily 90 capsule 11     Cyclobenzaprine HCl (FLEXERIL PO) Take 10 mg by mouth 3 times daily as needed for muscle spasms       dulaglutide (TRULICITY) 1.5 MG/0.5ML pen Inject 1.5 mg Subcutaneous every 7 days 2 mL 3     hydrocortisone 2.5 % cream Apply topically 2 times daily       insulin aspart (NOVOLOG PEN) 100 UNIT/ML pen Inject 5 units if BG is greater than 100; inject 10 units if  "BG is greater than 200, inject 15 if BG is greater than 300.  TDD: 30 units 15 mL 3     insulin degludec (TRESIBA FLEXTOUCH) 200 UNIT/ML pen Inject 80 Units Subcutaneous daily 18 mL 3     insulin pen needle (32G X 4 MM) 32G X 4 MM miscellaneous Use 1 pen needles daily 100 each 3     Ipratropium-Albuterol (COMBIVENT RESPIMAT)  MCG/ACT inhaler Inhale 1 puff into the lungs 4 times daily       lisinopril (PRINIVIL/ZESTRIL) 40 MG tablet Take 1 tablet (40 mg) by mouth daily 30 tablet 3     nystatin (MYCOSTATIN) 590390 UNIT/GM external powder Apply topically 3 times daily 60 g 3     ONETOUCH DELICA LANCETS 33G MISC 1 each 3 times daily 100 each 10     order for DME Women briefs 50 each 1     primidone (MYSOLINE) 50 MG tablet Take 1 tablet (50 mg) by mouth At Bedtime 30 tablet 0     triamcinolone (KENALOG) 0.1 % cream Apply topically 2 times daily              Allergies:     Allergies: Patient has no known allergies.          Past Medical History:     History reviewed. No pertinent past medical history.         Past Surgical History:     Past Surgical History:   Procedure Laterality Date     COLONOSCOPY N/A 8/20/2015    Procedure: COLONOSCOPY;  Surgeon: Gentry Porter MD;  Location: [facility]     COLONOSCOPY N/A 9/21/2018    Procedure: COLONOSCOPY;  COLONOSCOPY WITH POLYPECTOMY;  Surgeon: Gentry Porter MD;  Location: [facility]       ENT family history reviewed         Social History:     Social History     Tobacco Use     Smoking status: Current Every Day Smoker     Packs/day: 1.00     Years: 34.00     Pack years: 34.00     Types: Cigarettes     Start date: 1/1/1979     Smokeless tobacco: Never Used     Tobacco comment: Declined QP 10/2/19   Substance Use Topics     Alcohol use: No     Alcohol/week: 0.0 standard drinks     Drug use: No            Review of Systems:     ROS: See HPI         Physical Exam:     /86   Pulse 74   Temp 98.1  F (36.7  C) (Tympanic)   Ht 1.626 m (5' 4\")   Wt 95.3 kg (210 lb)   " SpO2 95%   BMI 36.05 kg/m     General - The patient is well nourished and well developed, and appears to have good nutritional status.  Alert and oriented to person and place, answers questions and cooperates with examination appropriately.   Head and Face - Normocephalic and atraumatic, with no gross asymmetry noted.  The facial nerve is intact, with strong symmetric movements.  Voice and Breathing - The patient was breathing comfortably without the use of accessory muscles. There was no wheezing, stridor. The patients voice was clear and strong, and had appropriate pitch and quality.  Ears - Left external ear/canal normal. Right external ear with verruca type lesionat the inferior opening of the ear canal. Lesion is raised and is about 6 mm at the base. Right tympanic membrane is intact without effusion, retraction or mass. Left tympanic membrane is intact without effusion, retraction or mass.  Eyes - Extraocular movements intact, and the pupils were reactive to light. Sclera were not icteric or injected, conjunctiva were pink and moist.  Mouth - Examination of the oral cavity showed pink, healthy oral mucosa.in good condition. No lesions or ulcerations noted. The tongue was mobile and midline.   Throat - The walls of the oropharynx were smooth, pink, moist, symmetric, and had no lesions or ulcerations.  The tonsillar pillars and soft palate were symmetric. The uvula was midline on elevation.    Neck - Normal midline excursion of the laryngotracheal complex during swallowing.  Full range of motion on passive movement.  Palpation of the occipital, submental, submandibular, internal jugular chain, and supraclavicular nodes did not demonstrate any abnormal lymph nodes or masses.  Palpation of the thyroid was soft and smooth, with no nodules or goiter appreciated.  The trachea was mobile and midline.  Nose - External contour is symmetric, no gross deflection or scars.      Office Procedure:   I discussed the risks  and complications of skin lesion excision, including local anesthesia, bleeding, infection, injury to the facial nerve, scar formation, hypertrophic healing or keloid, numbness to area, recurrence of lesion, benign versus malignant pathology and possible need for further surgery. All questions were answered.    After informed consent was discussed and a time- out taken, I proceeded to cleanse the skin around the lesion with alcohol. The area was prepped and draped in the normal sterile fashion.  I then infiltrated the skin with 1% lidocaine with 1:200,000 epinephrine.  I  used a curved iris to completely excise the lesion at the base.  After the lesion was completely removed, I then placed it in formalin for permanent section.  Hemostasis was achieved with direct pressure and disposable silver nitrate.Gelfoam soaked with ciprodex placed on excised area.  The total length of the incision was  0.6 cm.   The patient tolerated the procedure without any difficulty.         Assessment and Plan:       ICD-10-CM    1. Lesion of external ear canal, right H61.91 ofloxacin (FLOXIN) 0.3 % otic solution     Surgical pathology exam   2. Tobacco abuse counseling Z71.6      Discussed with patient that it takes 20 years of quitting tobacco to be at same risk of developing head and neck cancer as a person who has never used tobacco. Patient voiced understanding to ongoing risks associated with continued tobacco use. Tobacco cessation was strongly encouraged.    Additional surgery may need to be scheduled based on final pathology.  This was discussed today.    Further procedures may include skin excision with frozens and flap repair.    The risks of surgery were discussed, if further surgery is necessary.  These include but are not limited to anesthesia, scar or keloid formation, infection, flap failure, change in appearance of surrounding facial structures,  numbness to the skin, injury to the facial nerve with permanent facial  weakness which is exceedingly rare but possible.  Temporary weakness to the face may occur with the use of local anesthesia.    The patient expressed understanding.  All questions were answered.    I have instructed the patient on wound care and signs of infection.  Written instructions provided.  We will contact the patient with pathology results.  Apply floxin otic to area as directed.  Keep right ear dry otherwise.   Follow-up in 1 week for re-evaluation.  If lesion re-grows verruca type tissue, she may require deeper excision of area.    Sunscreen use and skin cancer preventive measures were reinforced  Encouraged to seek medical care sooner as needed.    Debo Jane NP  ENT  [facility], [place]  [phone]      Again, thank you for allowing me to participate in the care of your patient.        Sincerely,        BLAIR Mancera CNP

## 2019-10-02 NOTE — PROGRESS NOTES
Clinic Care Coordination Contact  Care Team Conversations    Chart reviewed by writer.  Placed call to pt's home care RN Nikole to let her know that Debo Jane, ELISE with ENT ordered an ear drop with the following instructions:    Use Ciprodex drops as prescribed for 1 week     Keep the right ear dry     Follow up in 1 week    [facility].  Pt is scheduled for follow-up next week 10/9/19 at 12:15PM.    Lauren Pipkin, BS-RN   CHF and General Care Coordinator  [phone] Option # 1

## 2019-10-02 NOTE — PATIENT INSTRUCTIONS
Thank you for allowing Debo Jane and our ENT team to participate in your care.  If your medications are too expensive, please give the nurse a call.  We can possibly change this medication.  If you have a scheduling or an appointment question please contact our Health Unit Coordinator at their direct line [phone].   ALL nursing questions or concerns can be directed to your ENT nurse at: [phone]    Use Ciprodex drops as prescribed for 1 week    Keep the right ear dry    Follow up in 1 week

## 2019-10-02 NOTE — PROGRESS NOTES
Otolaryngology Note         Chief Complaint:     Patient presents with:  Referral: Amberly Whyte Referral  Growth of right ear canal         History of Present Illness:     Marly Cannon is a 54 year old female seen today for concerns regarding a growth in her right ear canal. She noticed this when she put her finger in ear within the last few months. Never had this before. Has not noticed it growing/changing since she first noticed it. Denies pain, drainage, bleeding, scabbing. No known trauma to area.   No personal history of skin cancer.  No COM or otologic surgeries.  No hearing loss.          Medications:     Current Outpatient Rx   Medication Sig Dispense Refill     Acetaminophen (TYLENOL PO) Take 325 mg by mouth every 6 hours as needed        ammonium lactate (LAC-HYDRIN) 12 % cream Apply topically 2 times daily       aspirin 81 MG EC tablet Take 1 tablet (81 mg) by mouth daily 90 tablet 3     atorvastatin (LIPITOR) 40 MG tablet Take 1 tablet (40 mg) by mouth daily 90 tablet 3     blood glucose (NO BRAND SPECIFIED) test strip Use to test blood sugar 4 times daily or as directed. 300 strip 11     budesonide-formoterol (SYMBICORT) 160-4.5 MCG/ACT Inhaler Inhale 2 puffs into the lungs 2 times daily 1 Inhaler 3     Cholecalciferol (VITAMIN D-3) 1000 units CAPS Take 2,000 Units by mouth daily 90 capsule 11     Cyclobenzaprine HCl (FLEXERIL PO) Take 10 mg by mouth 3 times daily as needed for muscle spasms       dulaglutide (TRULICITY) 1.5 MG/0.5ML pen Inject 1.5 mg Subcutaneous every 7 days 2 mL 3     hydrocortisone 2.5 % cream Apply topically 2 times daily       insulin aspart (NOVOLOG PEN) 100 UNIT/ML pen Inject 5 units if BG is greater than 100; inject 10 units if BG is greater than 200, inject 15 if BG is greater than 300.  TDD: 30 units 15 mL 3     insulin degludec (TRESIBA FLEXTOUCH) 200 UNIT/ML pen Inject 80 Units Subcutaneous daily 18 mL 3     insulin pen needle (32G X 4 MM) 32G X 4 MM miscellaneous Use 1  "pen needles daily 100 each 3     Ipratropium-Albuterol (COMBIVENT RESPIMAT)  MCG/ACT inhaler Inhale 1 puff into the lungs 4 times daily       lisinopril (PRINIVIL/ZESTRIL) 40 MG tablet Take 1 tablet (40 mg) by mouth daily 30 tablet 3     nystatin (MYCOSTATIN) 830202 UNIT/GM external powder Apply topically 3 times daily 60 g 3     ONETOUCH DELICA LANCETS 33G MISC 1 each 3 times daily 100 each 10     order for DME Women briefs 50 each 1     primidone (MYSOLINE) 50 MG tablet Take 1 tablet (50 mg) by mouth At Bedtime 30 tablet 0     triamcinolone (KENALOG) 0.1 % cream Apply topically 2 times daily              Allergies:     Allergies: Patient has no known allergies.          Past Medical History:     History reviewed. No pertinent past medical history.         Past Surgical History:     Past Surgical History:   Procedure Laterality Date     COLONOSCOPY N/A 8/20/2015    Procedure: COLONOSCOPY;  Surgeon: Gentry Porter MD;  Location: [facility]     COLONOSCOPY N/A 9/21/2018    Procedure: COLONOSCOPY;  COLONOSCOPY WITH POLYPECTOMY;  Surgeon: Gentry Porter MD;  Location: [facility]       ENT family history reviewed         Social History:     Social History     Tobacco Use     Smoking status: Current Every Day Smoker     Packs/day: 1.00     Years: 34.00     Pack years: 34.00     Types: Cigarettes     Start date: 1/1/1979     Smokeless tobacco: Never Used     Tobacco comment: Declined QP 10/2/19   Substance Use Topics     Alcohol use: No     Alcohol/week: 0.0 standard drinks     Drug use: No            Review of Systems:     ROS: See HPI         Physical Exam:     /86   Pulse 74   Temp 98.1  F (36.7  C) (Tympanic)   Ht 1.626 m (5' 4\")   Wt 95.3 kg (210 lb)   SpO2 95%   BMI 36.05 kg/m    General - The patient is well nourished and well developed, and appears to have good nutritional status.  Alert and oriented to person and place, answers questions and cooperates with examination appropriately.   Head " and Face - Normocephalic and atraumatic, with no gross asymmetry noted.  The facial nerve is intact, with strong symmetric movements.  Voice and Breathing - The patient was breathing comfortably without the use of accessory muscles. There was no wheezing, stridor. The patients voice was clear and strong, and had appropriate pitch and quality.  Ears - Left external ear/canal normal. Right external ear with verruca type lesionat the inferior opening of the ear canal. Lesion is raised and is about 6 mm at the base. Right tympanic membrane is intact without effusion, retraction or mass. Left tympanic membrane is intact without effusion, retraction or mass.  Eyes - Extraocular movements intact, and the pupils were reactive to light. Sclera were not icteric or injected, conjunctiva were pink and moist.  Mouth - Examination of the oral cavity showed pink, healthy oral mucosa.in good condition. No lesions or ulcerations noted. The tongue was mobile and midline.   Throat - The walls of the oropharynx were smooth, pink, moist, symmetric, and had no lesions or ulcerations.  The tonsillar pillars and soft palate were symmetric. The uvula was midline on elevation.    Neck - Normal midline excursion of the laryngotracheal complex during swallowing.  Full range of motion on passive movement.  Palpation of the occipital, submental, submandibular, internal jugular chain, and supraclavicular nodes did not demonstrate any abnormal lymph nodes or masses.  Palpation of the thyroid was soft and smooth, with no nodules or goiter appreciated.  The trachea was mobile and midline.  Nose - External contour is symmetric, no gross deflection or scars.      Office Procedure:   I discussed the risks and complications of skin lesion excision, including local anesthesia, bleeding, infection, injury to the facial nerve, scar formation, hypertrophic healing or keloid, numbness to area, recurrence of lesion, benign versus malignant pathology and  possible need for further surgery. All questions were answered.    After informed consent was discussed and a time- out taken, I proceeded to cleanse the skin around the lesion with alcohol. The area was prepped and draped in the normal sterile fashion.  I then infiltrated the skin with 1% lidocaine with 1:200,000 epinephrine.  I  used a curved iris to completely excise the lesion at the base.  After the lesion was completely removed, I then placed it in formalin for permanent section.  Hemostasis was achieved with direct pressure and disposable silver nitrate.Gelfoam soaked with ciprodex placed on excised area.  The total length of the incision was  0.6 cm.   The patient tolerated the procedure without any difficulty.         Assessment and Plan:       ICD-10-CM    1. Lesion of external ear canal, right H61.91 ofloxacin (FLOXIN) 0.3 % otic solution     Surgical pathology exam   2. Tobacco abuse counseling Z71.6      Discussed with patient that it takes 20 years of quitting tobacco to be at same risk of developing head and neck cancer as a person who has never used tobacco. Patient voiced understanding to ongoing risks associated with continued tobacco use. Tobacco cessation was strongly encouraged.    Additional surgery may need to be scheduled based on final pathology.  This was discussed today.    Further procedures may include skin excision with frozens and flap repair.    The risks of surgery were discussed, if further surgery is necessary.  These include but are not limited to anesthesia, scar or keloid formation, infection, flap failure, change in appearance of surrounding facial structures,  numbness to the skin, injury to the facial nerve with permanent facial weakness which is exceedingly rare but possible.  Temporary weakness to the face may occur with the use of local anesthesia.    The patient expressed understanding.  All questions were answered.    I have instructed the patient on wound care and signs  of infection.  Written instructions provided.  We will contact the patient with pathology results.  Apply floxin otic to area as directed.  Keep right ear dry otherwise.   Follow-up in 1 week for re-evaluation.  If lesion re-grows verruca type tissue, she may require deeper excision of area.    Sunscreen use and skin cancer preventive measures were reinforced  Encouraged to seek medical care sooner as needed.    Debo Jane NP  ENT  [facility], [place] [employer]  [phone]

## 2019-10-02 NOTE — NURSING NOTE
"Chief Complaint   Patient presents with     Referral     Amberly Whyte Referral  Growth of right ear canal       Initial /86   Pulse 74   Temp 98.1  F (36.7  C) (Tympanic)   Ht 1.626 m (5' 4\")   Wt 95.3 kg (210 lb)   SpO2 95%   BMI 36.05 kg/m   Estimated body mass index is 36.05 kg/m  as calculated from the following:    Height as of this encounter: 1.626 m (5' 4\").    Weight as of this encounter: 95.3 kg (210 lb).  Medication Reconciliation: complete  Naty Otto LPN  "

## 2019-10-04 LAB — COPATH REPORT: NORMAL

## 2019-10-08 ENCOUNTER — TELEPHONE (OUTPATIENT)
Dept: FAMILY MEDICINE | Facility: OTHER | Age: 54
End: 2019-10-08

## 2019-10-08 NOTE — TELEPHONE ENCOUNTER
Spoke with Renea from [facility] - per  (PCP  out of office) - Verbal ok given for 1 x weekly skilled nursing visits to assist in medication set up and diabetes monitoring.    Lolis Hogan LPN on 10/8/2019 at 10:12 AM

## 2019-10-08 NOTE — TELEPHONE ENCOUNTER
10:07 AM    Reason for Call: Phone Call    Description: Renea from [facility] is needing verbal orders for continue skilled nursing visits 1 time a week for medication set up and diabetes monitoring.     [phone]    Was an appointment offered for this call? No  If yes : Appointment type              Date    Preferred method for responding to this message: Telephone Call  What is your phone number ?[phone]    If we cannot reach you directly, may we leave a detailed response at the number you provided? Yes    Can this message wait until your PCP/provider returns, if available today? No, would like covering provider to address     Lexus Theodore MA

## 2019-10-09 ENCOUNTER — OFFICE VISIT (OUTPATIENT)
Dept: OTOLARYNGOLOGY | Facility: OTHER | Age: 54
End: 2019-10-09
Attending: NURSE PRACTITIONER
Payer: MEDICARE

## 2019-10-09 VITALS
OXYGEN SATURATION: 93 % | BODY MASS INDEX: 35.85 KG/M2 | HEART RATE: 82 BPM | DIASTOLIC BLOOD PRESSURE: 84 MMHG | SYSTOLIC BLOOD PRESSURE: 122 MMHG | WEIGHT: 210 LBS | TEMPERATURE: 98.6 F | HEIGHT: 64 IN

## 2019-10-09 DIAGNOSIS — Z71.6 TOBACCO ABUSE COUNSELING: ICD-10-CM

## 2019-10-09 DIAGNOSIS — Z09 S/P EXCISION OF SKIN LESION, FOLLOW-UP EXAM: Primary | ICD-10-CM

## 2019-10-09 PROCEDURE — G0463 HOSPITAL OUTPT CLINIC VISIT: HCPCS

## 2019-10-09 PROCEDURE — 99024 POSTOP FOLLOW-UP VISIT: CPT | Performed by: NURSE PRACTITIONER

## 2019-10-09 ASSESSMENT — MIFFLIN-ST. JEOR: SCORE: 1527.55

## 2019-10-09 ASSESSMENT — PAIN SCALES - GENERAL: PAINLEVEL: NO PAIN (0)

## 2019-10-09 NOTE — LETTER
"    10/9/2019         RE: Marly Cannon  [address]  [place] [address]        Dear Colleague,    Thank you for referring your patient, Marly Cannon, to the [facility]. Please see a copy of my visit note below.    Otolaryngology Progress Note           Chief Complaint:     Patient presents with:  RECHECK: Follow Up Excision Lesion of External Ear Canal         History of Present Illness:     Marly Cannon is a 54 year old female here to follow-up on recent lesion excision of left EAC that I performed on 10/2/19. She reports she has been following post lesion removal instructions as directed. Had some black drainage (likely from silver nitrate), otherwise no bleeding or colored/odorous drainage. Denies pain, swelling, fever.     Has no questions/concerns.    Pathology 10/2/19  SPECIMEN(S):   Skin, right distal ear canal     FINAL DIAGNOSIS:   Skin, right distal ear canal, excision   - Verruca          Review of Systems:     ROS: See HPI         Physical Exam:     /84   Pulse 82   Temp 98.6  F (37  C) (Tympanic)   Ht 1.626 m (5' 4\")   Wt 95.3 kg (210 lb)   SpO2 93%   BMI 36.05 kg/m     General - The patient is well nourished and well developed, and appears to have good nutritional status.  Alert and oriented to person and place, interactive.  Head and Face - Normocephalic and atraumatic, with no gross asymmetry noted of the contour of the facial features.  The facial nerve is intact, with strong symmetric movements.  Neck- No palpable lymphadenopathy or thyroid mass.  Trachea is midline.  Ears- Right EAC opening has a well healed lesion removal excision. No erythema, edema, drainage or signs of infection. Area closed well. Canals clear. Right tympanic membrane intact without effusion, worrisome retraction or mass. Left tympanic membrane intact without effusion, worrisome retraction or mass.           Assessment and Plan:       ICD-10-CM    1. S/P excision of skin " lesion, follow-up exam Z09    2. Tobacco abuse counseling Z71.6      Discussed with patient that it takes 20 years of quitting tobacco to be at same risk of developing head and neck cancer as a person who has never used tobacco. Patient voiced understanding to ongoing risks associated with continued tobacco use. Tobacco cessation was strongly encouraged.    Lesion excision area healed well with no signs of complications or infections.    Reviewed final pathology, which is benign.    She will follow-up in ENT as needed.    Debo Jane NP  ENT  [facility], [place]  [phone]              Again, thank you for allowing me to participate in the care of your patient.        Sincerely,        BLAIR Mancera CNP

## 2019-10-09 NOTE — PROGRESS NOTES
"Otolaryngology Progress Note           Chief Complaint:     Patient presents with:  RECHECK: Follow Up Excision Lesion of External Ear Canal         History of Present Illness:     Marly Cannon is a 54 year old female here to follow-up on recent lesion excision of left EAC that I performed on 10/2/19. She reports she has been following post lesion removal instructions as directed. Had some black drainage (likely from silver nitrate), otherwise no bleeding or colored/odorous drainage. Denies pain, swelling, fever.     Has no questions/concerns.    Pathology 10/2/19  SPECIMEN(S):   Skin, right distal ear canal     FINAL DIAGNOSIS:   Skin, right distal ear canal, excision   - Verruca          Review of Systems:     ROS: See HPI         Physical Exam:     /84   Pulse 82   Temp 98.6  F (37  C) (Tympanic)   Ht 1.626 m (5' 4\")   Wt 95.3 kg (210 lb)   SpO2 93%   BMI 36.05 kg/m    General - The patient is well nourished and well developed, and appears to have good nutritional status.  Alert and oriented to person and place, interactive.  Head and Face - Normocephalic and atraumatic, with no gross asymmetry noted of the contour of the facial features.  The facial nerve is intact, with strong symmetric movements.  Neck- No palpable lymphadenopathy or thyroid mass.  Trachea is midline.  Ears- Right EAC opening has a well healed lesion removal excision. No erythema, edema, drainage or signs of infection. Area closed well. Canals clear. Right tympanic membrane intact without effusion, worrisome retraction or mass. Left tympanic membrane intact without effusion, worrisome retraction or mass.           Assessment and Plan:       ICD-10-CM    1. S/P excision of skin lesion, follow-up exam Z09    2. Tobacco abuse counseling Z71.6      Discussed with patient that it takes 20 years of quitting tobacco to be at same risk of developing head and neck cancer as a person who has never used tobacco. Patient voiced " understanding to ongoing risks associated with continued tobacco use. Tobacco cessation was strongly encouraged.    Lesion excision area healed well with no signs of complications or infections.    Reviewed final pathology, which is benign.    She will follow-up in ENT as needed.    Debo Jane NP  ENT  [facility], [place]  [phone]

## 2019-10-09 NOTE — PATIENT INSTRUCTIONS
Thank you for allowing Dr. Jaimes and our ENT team to participate in your care.  If your medications are too expensive, please give the nurse a call.  We can possibly change this medication.  If you have a scheduling or an appointment question please contact our Health Unit Coordinator at their direct line [phone].   ALL nursing questions or concerns can be directed to your ENT nurse at: [phone] - Jessica    Follow up with ENT as needed

## 2019-10-09 NOTE — NURSING NOTE
"Chief Complaint   Patient presents with     RECHECK     Follow Up Excision Lesion of External Ear Canal       Initial /84   Pulse 82   Temp 98.6  F (37  C) (Tympanic)   Ht 1.626 m (5' 4\")   Wt 95.3 kg (210 lb)   SpO2 93%   BMI 36.05 kg/m   Estimated body mass index is 36.05 kg/m  as calculated from the following:    Height as of this encounter: 1.626 m (5' 4\").    Weight as of this encounter: 95.3 kg (210 lb).  Medication Reconciliation: complete  Naty Otto LPN  "

## 2019-10-18 DIAGNOSIS — G25.0 ESSENTIAL TREMOR: ICD-10-CM

## 2019-10-18 NOTE — TELEPHONE ENCOUNTER
primidone (MYSOLINE) 50 MG tablet    Last Written Prescription Date:  9-11-19  Last Fill Quantity: 30,   # refills: 0  Last Office Visit: 9-11-19  Future Office visit:    Next 5 appointments (look out 90 days)    Dec 04, 2019  2:10 PM CST  (Arrive by 1:55 PM)  Office Visit with Amberly Whyte NP  [facility] ([facility] ) [address]  [place] [address]  [phone]

## 2019-10-21 NOTE — PROGRESS NOTES
Jaynes, Tammy R Pipkin, Lauren N, RN             Received your message. I went ahead and faxed down this referral for the  Endocrinologist. They will review and then call the patient to schedule.     For Dr. Eason I had to send a message to his  and make sure patient is due to be seen.       Thanks   Rebecca

## 2019-10-22 RX ORDER — PRIMIDONE 50 MG/1
50 TABLET ORAL AT BEDTIME
Qty: 30 TABLET | Refills: 0 | Status: SHIPPED | OUTPATIENT
Start: 2019-10-22 | End: 2019-12-20

## 2019-10-23 ENCOUNTER — PATIENT OUTREACH (OUTPATIENT)
Dept: CARE COORDINATION | Facility: OTHER | Age: 54
End: 2019-10-23

## 2019-10-23 ENCOUNTER — ALLIED HEALTH/NURSE VISIT (OUTPATIENT)
Dept: EDUCATION SERVICES | Facility: OTHER | Age: 54
End: 2019-10-23
Attending: NURSE PRACTITIONER
Payer: MEDICARE

## 2019-10-23 VITALS
RESPIRATION RATE: 20 BRPM | BODY MASS INDEX: 35.56 KG/M2 | HEART RATE: 90 BPM | SYSTOLIC BLOOD PRESSURE: 126 MMHG | HEIGHT: 64 IN | WEIGHT: 208.3 LBS | OXYGEN SATURATION: 93 % | DIASTOLIC BLOOD PRESSURE: 82 MMHG

## 2019-10-23 DIAGNOSIS — E11.65 TYPE 2 DIABETES MELLITUS WITH HYPERGLYCEMIA, WITH LONG-TERM CURRENT USE OF INSULIN (H): ICD-10-CM

## 2019-10-23 DIAGNOSIS — Z71.6 TOBACCO ABUSE COUNSELING: ICD-10-CM

## 2019-10-23 DIAGNOSIS — Z23 NEED FOR PROPHYLACTIC VACCINATION AND INOCULATION AGAINST INFLUENZA: Primary | ICD-10-CM

## 2019-10-23 DIAGNOSIS — Z79.4 TYPE 2 DIABETES MELLITUS WITH HYPERGLYCEMIA, WITH LONG-TERM CURRENT USE OF INSULIN (H): ICD-10-CM

## 2019-10-23 DIAGNOSIS — Z72.0 TOBACCO ABUSE: ICD-10-CM

## 2019-10-23 PROCEDURE — 99213 OFFICE O/P EST LOW 20 MIN: CPT | Performed by: NURSE PRACTITIONER

## 2019-10-23 PROCEDURE — 95250 CONT GLUC MNTR PHYS/QHP EQP: CPT

## 2019-10-23 PROCEDURE — 90686 IIV4 VACC NO PRSV 0.5 ML IM: CPT

## 2019-10-23 PROCEDURE — G0008 ADMIN INFLUENZA VIRUS VAC: HCPCS | Performed by: NURSE PRACTITIONER

## 2019-10-23 PROCEDURE — G0463 HOSPITAL OUTPT CLINIC VISIT: HCPCS | Mod: 25

## 2019-10-23 ASSESSMENT — PAIN SCALES - GENERAL: PAINLEVEL: NO PAIN (0)

## 2019-10-23 ASSESSMENT — MIFFLIN-ST. JEOR: SCORE: 1515.87

## 2019-10-23 ASSESSMENT — ACTIVITIES OF DAILY LIVING (ADL): DEPENDENT_IADLS:: INDEPENDENT

## 2019-10-23 NOTE — PROGRESS NOTES
SUBJECTIVE:  Marly Cannon, 54 year old, female presents with the following Chief Complaint(s) with HPI to follow:  Chief Complaint   Patient presents with     Diabetes     Imm/Inj     Flu Shot        Diabetes Follow-up    Patient is checking blood sugars: 2.3x/day.  Results:  Highest: 468  Lowest: 197  Period average: 323    Values above, >180: 32  Values within goal (): 0  Values below goal, <70: 0        Symptoms of hypoglycemia (low blood sugar): none    Paresthesias (numbness or burning in feet) or sores: No    Diabetic eye exam within the last year: Yes    Breakfast eaten regularly: once in awhile     Patient counting carbs: Yes    Walking: almost every day when it's nice       HPI:  Marly's here today for the follow up regarding her Diabetes mellitus, Type 2.    Lab Results   Component Value Date    A1C 12.0 08/29/2019    A1C 11.0 05/29/2019    A1C 9.8 02/22/2019    A1C 10.9 11/21/2018    A1C 9.6 08/21/2018    A1C 9.6 08/21/2018     Current Diabetes medication:   1.  Tresiba 80  units daily @ bedtime--states she's not taking this  2.  Trulicity 1.5 mg weekly with her nurse (Tuesday)  3.  Novolog--before meals--not takig  >100: 5 units  >200: 10 units  >300: 12 units  ASA use: yes, 81 mg daily  Statin use: yes, simvastatin 20 mg daily    Marly's here today for a meter download and possible insulin adjustment.    Marly tells me that she is not taking her Tresiba and Novolog      Denies any new health concerns.      Josie KABA, RN Care Coordinator in today's appt    Decision made to start a CGM study    Patient Active Problem List   Diagnosis     Type 2 diabetes, HbA1c goal < 7% (H)     ACP (advance care planning)     Stage 3 chronic kidney disease (H)     Pulmonary emphysema (H)     Hyperparathyroidism due to renal insufficiency (H)     Morbid obesity (H)     Vitamin D deficiency     Intellectual disability     Breast fibrocystic disorder     Tobacco abuse     Microalbuminuria due to type 2 diabetes  mellitus (H)     H/O colonoscopy     [place] cardiac risk <10% in next 10 years     Hypomagnesemia     Tubular adenoma of colon     Hyperlipidemia, unspecified hyperlipidemia type     Essential hypertension     Callus of foot       No past medical history on file.    Past Surgical History:   Procedure Laterality Date     COLONOSCOPY N/A 8/20/2015    Procedure: COLONOSCOPY;  Surgeon: Gentry Porter MD;  Location: [facility]     COLONOSCOPY N/A 9/21/2018    Procedure: COLONOSCOPY;  COLONOSCOPY WITH POLYPECTOMY;  Surgeon: Gentry Porter MD;  Location: [facility] OR       Family History   Problem Relation Age of Onset     Diabetes Mother      Diabetes Father      Hypertension Brother      Diabetes Sister        Social History     Tobacco Use     Smoking status: Current Every Day Smoker     Packs/day: 1.00     Years: 34.00     Pack years: 34.00     Types: Cigarettes     Start date: 1/1/1979     Smokeless tobacco: Never Used     Tobacco comment: Declined QP 10/2/19   Substance Use Topics     Alcohol use: No     Alcohol/week: 0.0 standard drinks       Current Outpatient Medications   Medication Sig Dispense Refill     Acetaminophen (TYLENOL PO) Take 325 mg by mouth every 6 hours as needed        ammonium lactate (LAC-HYDRIN) 12 % cream Apply topically 2 times daily       aspirin 81 MG EC tablet Take 1 tablet (81 mg) by mouth daily 90 tablet 3     atorvastatin (LIPITOR) 40 MG tablet Take 1 tablet (40 mg) by mouth daily 90 tablet 3     blood glucose (NO BRAND SPECIFIED) test strip Use to test blood sugar 4 times daily or as directed. 300 strip 11     budesonide-formoterol (SYMBICORT) 160-4.5 MCG/ACT Inhaler Inhale 2 puffs into the lungs 2 times daily 1 Inhaler 3     Cholecalciferol (VITAMIN D-3) 1000 units CAPS Take 2,000 Units by mouth daily 90 capsule 11     Cyclobenzaprine HCl (FLEXERIL PO) Take 10 mg by mouth 3 times daily as needed for muscle spasms       hydrocortisone 2.5 % cream Apply topically 2 times daily        "insulin aspart (NOVOLOG PEN) 100 UNIT/ML pen Inject 5 units if BG is greater than 100; inject 10 units if BG is greater than 200, inject 15 if BG is greater than 300.  TDD: 30 units 15 mL 3     insulin degludec (TRESIBA FLEXTOUCH) 200 UNIT/ML pen Inject 80 Units Subcutaneous daily 18 mL 3     insulin pen needle (32G X 4 MM) 32G X 4 MM miscellaneous Use 1 pen needles daily 100 each 3     Ipratropium-Albuterol (COMBIVENT RESPIMAT)  MCG/ACT inhaler Inhale 1 puff into the lungs 4 times daily       lisinopril (PRINIVIL/ZESTRIL) 40 MG tablet Take 1 tablet (40 mg) by mouth daily 30 tablet 3     nystatin (MYCOSTATIN) 057106 UNIT/GM external powder Apply topically 3 times daily 60 g 3     ONETOUCH DELICA LANCETS 33G MISC 1 each 3 times daily 100 each 10     order for DME Women briefs 50 each 1     primidone (MYSOLINE) 50 MG tablet Take 1 tablet (50 mg) by mouth At Bedtime 30 tablet 0     Semaglutide,0.25 or 0.5MG/DOS, (OZEMPIC, 0.25 OR 0.5 MG/DOSE,) 2 MG/1.5ML SOPN Inject 0.5 mg Subcutaneous every 7 days       triamcinolone (KENALOG) 0.1 % cream Apply topically 2 times daily         No Known Allergies    REVIEW OF SYSTEMS  Skin: negative  Eyes: negative   Ears/Nose/Throat: glasses; negative  Respiratory: No shortness of breath, cough, or hemoptysis;   Cardiovascular: negative  Gastrointestinal: negative  Genitourinary: occasionally with high BG  Musculoskeletal: negative; every Monday ([facility]    Neurologic: negative  Psychiatric: negative  Hematologic/Lymphatic/Immunologic: continued  Endocrine: positive for diabetes; sweaty spells--none lately     OBJECTIVE:  /82   Pulse 90   Resp 20   Ht 1.619 m (5' 3.75\")   Wt 94.5 kg (208 lb 4.8 oz)   SpO2 93%   BMI 36.04 kg/m    Constitutional: alert and no distress  Musculoskeletal: extremities normal- no gross deformities noted and gait normal  Psychiatric: mentation appears normal for baseline and affect normal/bright    LABS  Results for " orders placed or performed in visit on 10/02/19   Surgical pathology exam   Result Value Ref Range    Copath Report       Patient Name: MATTHEW STEEN  MR#: [id number]  Specimen #: [id number]  Collected: 10/2/2019  Received: 10/3/2019  Reported: 10/4/2019 14:25  Ordering Phy(s): CHRIST BAEZ  Additional Phy(s): KATERYNA POSADA    For improved result formatting, select 'View Enhanced Report Format' under   Linked Documents section.    SPECIMEN(S):  Skin, right distal ear canal    FINAL DIAGNOSIS:  Skin, right distal ear canal, excision  - Verruca    I have personally reviewed all specimens and/or slides, including the   listed special stains, and used them  with my medical judgement to determine or confirm the final diagnosis.    Electronically signed out by:    Anupam Lorenzana M.D.    CLINICAL HISTORY:  Skin lesion; lesion of external ear canal, right.    GROSS:  There are two pieces of tan soft tissue.  One of them is a cylinder which   is 5 x 5 mm with a granular tan  surface.  The other is a 3 mm piece of tan soft tissue.  The margin of the   large piece is inked and that  tissue divided. (3 TE in 1  block). (Dictated by: Anupam Lorenzana MD   10/3/2019 04:20 PM)    MICROSCOPIC:  There is skin.  There is acanthosis with hyperkeratosis and parakeratosis.    The parakeratosis is above  elongated rete ridges.  The keratohyalin granules vary in size and   distribution.    CPT Codes  A: 00170-ES4    COLLECTION SITE:  Client: [facility]  Location: [facility] ()    The technical component of this testing was completed at the [facility], with the  professional component performed at the [facility], [address] [place] [address]  ([phone])           ASSESSMENT / PLAN:  (Z23) Need for prophylactic vaccination and inoculation against influenza  (primary encounter diagnosis)  Comment: noted  Plan: INFLUENZA VACCINE IM > 6 MONTHS VALENT IIV4          [87047], ADMIN INFLUENZA (For MEDICARE Patients        ONLY) []          See Anna MUNOZ LPN's note for details    (Z72.0) Tobacco abuse  Comment: noted and refused   Plan: Tobacco Cessation - Order to Satisfy Health         Maintenance          Marly's aware to let us know when she's ready to quit smoking.  Discussed the dangers of smoking with diabetes    (Z71.6) Tobacco abuse counseling  Comment: noted  Plan: as noted above    (E11.65,  Z79.4) Type 2 diabetes mellitus with hyperglycemia, with long-term current use of insulin (H)  Comment:   Sensor attached to left lateral upper arm. No redness, drainage or bleeding noted. Cleansed with alcohol wipe and utilized IV prep prior to attachment.  Pt instructed on testing schedule, restrictions during wear, and to remove if needs to have MRI, CT or x-ray. Pt will return in one week x 2 for detach and evaluation.     Plan:   Will start the V-go 20--will teach Marly how to use the bolus option later.   I just want to see if this works     Follow up daily    Sooner if needed    Patient Instructions     HOW TO QUIT SMOKING  Smoking is one of the hardest habits to break. About half of all those who have ever smoked have been able to quit, and most of those (about 70%) who still smoke want to quit. Here are some of the best ways to stop smoking.     KEEP TRYING:  It takes most smokers about 8 tries before they are finally able to fully quit. So, the more often you try and fail, the better your chance of quitting the next time! So, don't give up!    GO COLD TURKEY:  Most ex-smokers quit cold turkey. Trying to cut back gradually doesn't seem to work as well, perhaps because it continues the smoking habit. Also, it is possible to fool yourself by inhaling more while smoking fewer cigarettes. This results in the same amount of nicotine in your body!    GET SUPPORT:  Support programs can make an important difference, especially for the heavy smoker. These groups offer  lectures, methods to change your behavior and peer support. Call the free national Quitline for more information. 800-QUIT-NOW (520-655-6317). Low-cost or free programs are offered by many hospitals, local chapters of the American Lung Association (692-749-9169) and the American Cancer Society (745-730-4478). Support at home is important too. Non-smokers can help by offering praise and encouragement. If the smoker fails to quit, encourage them to try again!    OVER-THE-COUNTER MEDICINES:  For those who can't quit on their own, Nicotine Replacement Therapy (NRT) may make quitting much easier. Certain aids such as the nicotine patch, gum and lozenge are available without a prescription. However, it is best to use these under the guidance of your doctor. The skin patch provides a steady supply of nicotine to the body. Nicotine gum and lozenge gives temporary bursts of low levels of nicotine. Both methods take the edge off the craving for cigarettes. WARNING: If you feel symptoms of nicotine overdose, such as nausea, vomiting, dizziness, weakness, or fast heartbeat, stop using these and see your doctor.    PRESCRIPTION MEDICINES:  After evaluating your smoking patterns and prior attempts at quitting, your doctor may offer a prescription medicine such as bupropion (Zyban, Wellbutrin), varenicline (Chantix, Champix), a niocotine inhaler or nasal spray. Each has its unique advantage and side effects which your doctor can review with you.    HEALTH BENEFITS OF QUITTING:  The benefits of quitting start right away and keep improving the longer you go without smokin minutes: blood pressure and pulse return to normal  8 hours: oxygen levels return to normal  2 days: ability to smell and taste begins to improve as damaged nerves start to regrow  2-3 weeks: circulation and lung function improves  1-9 months: decreased cough, congestion and shortness of breath; less tired  1 year: risk of heart attack decreases by half  5  years: risk of lung cancer decreases by half; risk of stroke becomes the same as a non-smoker  For information about how to quit smoking, visit the following links:  [employer] [place] ,   Clearing the Air, Quit Smoking Today   - an online booklet. http://www.smokefree.gov/pubs/clearing_the_air.pdf  Smokefree.gov http://smokefree.gov/  QuitNet http://www.quitnet.com/    3190-9033 Elisa Munson, [address] [place] [address], [place] [address]. All rights reserved. This information is not intended as a substitute for professional medical care. Always follow your healthcare professional's instructions.    The Benefits of Living Smoke Free  What do you want to gain from quitting? Check off some reasons to quit.  Health Benefits  ___ Reduce my risk of lung cancer, heart disease, chronic lung disease  ___ Have fewer wrinkles and softer skin  ___ Improve my sense of taste and smell  ___ For pregnant women--reduce the risk of having a miscarriage, stillbirth, premature birth, or low-birth-weight baby  Personal Benefits  ___ Feel more in control of my life  ___ Have better-smelling hair, breath, clothes, home, and car  ___ Save time by not having to take smoke breaks, buy cigarettes, or hunt for a light  ___ Have whiter teeth  Family Benefits  ___ Reduce my children s respiratory tract infections  ___ Set a good example for my children  ___ Reduce my family s cancer risk  Financial Benefits  ___ Save hundreds of dollars each year that would be spent on cigarettes  ___ Save money on medical bills  ___ Save on life, health, and car insurance premiums    Those Dollars Add Up!  Cigarettes are expensive, and getting more expensive all the time. Do you realize how much money you are spending on cigarettes per year? What is the average amount you spend on a pack of cigarettes? What is the average number of packs that you smoke per day? Using your answers to these questions, fill in this formula to help you find out:  ($ _____  per pack) ×  ( _____ number of packs per day) × (365 days) =  $ _____ yearly cost of smoking  Besides tobacco, there are other costs, including extra cleaning bills and replacement costs for clothing and furniture; medical expenses for smoking-related illnesses; and higher health, life, and car insurance premiums.    Cigars and Pipes Count Too!  Cigars and pipes are also dangerous. So are smokeless (chewing) tobacco and snuff. All of these products contain nicotine, a highly addictive substance that has harmful effects on your body. Quitting smoking means giving up all tobacco products.      9371-8935 [employer] [facility], [address] [place] [address], [place] [address]. All rights reserved. This information is not intended as a substitute for professional medical care. Always follow your healthcare professional's instructions.    Time: 35 minutes  Barrier: see [facility]  Willingness to learn: accepting    Courtney PINTO FNP-BC  Disease Management    Cc: Amberly Whyte NP    With the electronic record, we can now more quickly and easily track our patient diabetic goals. Our diabetes clinical review is in progress and these are the indicators we are monitoring for good diabetes health.     1.) HbA1C less than 7 (measurement of your average blood sugars)  2.) Blood Pressure less than 140/80  3.) LDL less than 100 (bad cholesterol)  4.) HbA1C is checked in the last 6 months and below 7% (more frequently if not at goal or adjusting medications)  5.) LDL is checked in the last 12 months (more frequently if not at goal or adjusting medications)  6.) Taking one baby aspirin daily (unless otherwise instructed)  7.) No tobacco use  8) Statin use     You have achieved 6 out of 8 of these and I am encouraging you to come in and get tuned up to achieve 8 out of 8!  Here is what you have achieved so far in my goals for you:  1.) HbA1C  less than 7:                              NO  Your last  HbA1C :  Lab Results   Component Value Date    A1C  12.0 08/29/2019    A1C 11.0 05/29/2019    A1C 9.8 02/22/2019    A1C 10.9 11/21/2018    A1C 9.6 08/21/2018    A1C 9.6 08/21/2018     2.) Blood Pressure less than 140/80:       YES      Your last    BP Readings from Last 1 Encounters:   10/23/19 126/82     3.) LDL less than 100:                              YES      Your last     LDL Cholesterol Calculated   Date Value Ref Range Status   03/29/2019 72 <100 mg/dL Final       4.) Checked HbA1C in the past 6 months: YES      5.) Checked LDL in the past 12 months:    YES      6.) Taking one aspirin daily:                       YES     7.) No tobacco use:                                        NO   8.) Statin use      YES

## 2019-10-23 NOTE — PATIENT INSTRUCTIONS
-Please stop taking your daily dose of Tresiba    -We are starting you on a system of insulin delivery called V-Go (Courtney is checking with 's on insurance coverage and we will try to do a prior authorization if necessary)    -We have scheduled you to see Courtney tomorrow and again next Wednesday to help you get started with this system.    Please call if you have any questions or concerns    Lauren Pipkin, BS-RN   CHF and General Care Coordinator  [phone] Option # 1          HOW TO QUIT SMOKING  Smoking is one of the hardest habits to break. About half of all those who have ever smoked have been able to quit, and most of those (about 70%) who still smoke want to quit. Here are some of the best ways to stop smoking.     KEEP TRYING:  It takes most smokers about 8 tries before they are finally able to fully quit. So, the more often you try and fail, the better your chance of quitting the next time! So, don't give up!    GO COLD TURKEY:  Most ex-smokers quit cold turkey. Trying to cut back gradually doesn't seem to work as well, perhaps because it continues the smoking habit. Also, it is possible to fool yourself by inhaling more while smoking fewer cigarettes. This results in the same amount of nicotine in your body!    GET SUPPORT:  Support programs can make an important difference, especially for the heavy smoker. These groups offer lectures, methods to change your behavior and peer support. Call the free national Quitline for more information. 800-QUIT-NOW (413-289-8450). Low-cost or free programs are offered by many hospitals, local chapters of the American Lung Association (229-051-6758) and the American Cancer Society (964-488-7971). Support at home is important too. Non-smokers can help by offering praise and encouragement. If the smoker fails to quit, encourage them to try again!    OVER-THE-COUNTER MEDICINES:  For those who can't quit on their own, Nicotine Replacement Therapy (NRT) may make quitting  much easier. Certain aids such as the nicotine patch, gum and lozenge are available without a prescription. However, it is best to use these under the guidance of your doctor. The skin patch provides a steady supply of nicotine to the body. Nicotine gum and lozenge gives temporary bursts of low levels of nicotine. Both methods take the edge off the craving for cigarettes. WARNING: If you feel symptoms of nicotine overdose, such as nausea, vomiting, dizziness, weakness, or fast heartbeat, stop using these and see your doctor.    PRESCRIPTION MEDICINES:  After evaluating your smoking patterns and prior attempts at quitting, your doctor may offer a prescription medicine such as bupropion (Zyban, Wellbutrin), varenicline (Chantix, Champix), a niocotine inhaler or nasal spray. Each has its unique advantage and side effects which your doctor can review with you.    HEALTH BENEFITS OF QUITTING:  The benefits of quitting start right away and keep improving the longer you go without smokin minutes: blood pressure and pulse return to normal  8 hours: oxygen levels return to normal  2 days: ability to smell and taste begins to improve as damaged nerves start to regrow  2-3 weeks: circulation and lung function improves  1-9 months: decreased cough, congestion and shortness of breath; less tired  1 year: risk of heart attack decreases by half  5 years: risk of lung cancer decreases by half; risk of stroke becomes the same as a non-smoker  For information about how to quit smoking, visit the following links:  National Cancer [place] ,   Clearing the Air, Quit Smoking Today   - an online booklet. http://www.smokefree.gov/pubs/clearing_the_air.pdf  Smokefree.gov http://smokefree.gov/  QuitNet http://www.quitnet.com/    0162-4280 Elisa Munson, [address] [place] [address], [place] [address]. All rights reserved. This information is not intended as a substitute for professional medical care. Always follow your healthcare  professional's instructions.    The Benefits of Living Smoke Free  What do you want to gain from quitting? Check off some reasons to quit.  Health Benefits  ___ Reduce my risk of lung cancer, heart disease, chronic lung disease  ___ Have fewer wrinkles and softer skin  ___ Improve my sense of taste and smell  ___ For pregnant women--reduce the risk of having a miscarriage, stillbirth, premature birth, or low-birth-weight baby  Personal Benefits  ___ Feel more in control of my life  ___ Have better-smelling hair, breath, clothes, home, and car  ___ Save time by not having to take smoke breaks, buy cigarettes, or hunt for a light  ___ Have whiter teeth  Family Benefits  ___ Reduce my children s respiratory tract infections  ___ Set a good example for my children  ___ Reduce my family s cancer risk  Financial Benefits  ___ Save hundreds of dollars each year that would be spent on cigarettes  ___ Save money on medical bills  ___ Save on life, health, and car insurance premiums    Those Dollars Add Up!  Cigarettes are expensive, and getting more expensive all the time. Do you realize how much money you are spending on cigarettes per year? What is the average amount you spend on a pack of cigarettes? What is the average number of packs that you smoke per day? Using your answers to these questions, fill in this formula to help you find out:  ($ _____ per pack) ×  ( _____ number of packs per day) × (365 days) =  $ _____ yearly cost of smoking  Besides tobacco, there are other costs, including extra cleaning bills and replacement costs for clothing and furniture; medical expenses for smoking-related illnesses; and higher health, life, and car insurance premiums.    Cigars and Pipes Count Too!  Cigars and pipes are also dangerous. So are smokeless (chewing) tobacco and snuff. All of these products contain nicotine, a highly addictive substance that has harmful effects on your body. Quitting smoking means giving up all tobacco  products.      5447-8545 Elisa Munson, [address] [place] [address], [place] [address]. All rights reserved. This information is not intended as a substitute for professional medical care. Always follow your healthcare professional's instructions.

## 2019-10-23 NOTE — PROGRESS NOTES
Clinic Care Coordination Contact  Care Team Conversations    Care coordinator attended office visit with pt and Courtney Chaudhary NP this date. Please refer to provider progress note for updates to patient's plan of care.      *Marly reports she no longer has any workers from [facility].  She states [facility] and they asked her if she wanted their services anymore.  She declined continued services.  States she believes she is doing better without them.      *Marly reports she hasn't taken her Tresiba in several days.  Is, however, getting her weekly dose of ozempic since her home care RN watches her administer it herself.  She reports she likes her home care nurse.  Does not want to lose this service.    *Reviewed Care Everywhere to confirm upcoming appts:    -Will see endocrinology Rosenda Hall NP in [place] as well as their [facility] on 10/30/19.      -Will see neuropsych provider Jean Claude Cristobal in [place] on 11/25/19     Pt reports she has transportation set up- a friend of her boyfriend- for both dates.    *Writer placed a phone call to Marly's home care RN Nikole.  Left a message for her to call back to update her.    *Still waiting on nephrology appt.  EMILY Fernandez previously informed writer she had a message out to Dr Eason's office about this.  Waiting on update on this from Rebecca as pt still has no appt set up.    Encouraged pt to call care coordinator with any questions/concerns/problems.  Verbalized understanding.    Lauren Pipkin, BS-RN   CHF and General Care Coordinator  [phone] Option # 1

## 2019-10-24 ENCOUNTER — ALLIED HEALTH/NURSE VISIT (OUTPATIENT)
Dept: EDUCATION SERVICES | Facility: OTHER | Age: 54
End: 2019-10-24
Attending: NURSE PRACTITIONER
Payer: MEDICARE

## 2019-10-24 VITALS
WEIGHT: 207.3 LBS | RESPIRATION RATE: 16 BRPM | HEART RATE: 99 BPM | OXYGEN SATURATION: 92 % | BODY MASS INDEX: 35.39 KG/M2 | SYSTOLIC BLOOD PRESSURE: 105 MMHG | DIASTOLIC BLOOD PRESSURE: 86 MMHG | HEIGHT: 64 IN

## 2019-10-24 DIAGNOSIS — E11.9 DIABETES MELLITUS TYPE 2, INSULIN DEPENDENT (H): ICD-10-CM

## 2019-10-24 DIAGNOSIS — Z79.4 DIABETES MELLITUS TYPE 2, INSULIN DEPENDENT (H): ICD-10-CM

## 2019-10-24 PROCEDURE — 99212 OFFICE O/P EST SF 10 MIN: CPT | Performed by: NURSE PRACTITIONER

## 2019-10-24 PROCEDURE — G0463 HOSPITAL OUTPT CLINIC VISIT: HCPCS

## 2019-10-24 ASSESSMENT — PAIN SCALES - GENERAL: PAINLEVEL: NO PAIN (0)

## 2019-10-24 ASSESSMENT — MIFFLIN-ST. JEOR: SCORE: 1511.34

## 2019-10-24 NOTE — PROGRESS NOTES
"Chief Complaint   Patient presents with     Diabetes     Pt is in for a fu after starting VGO and Bud.       Initial /86   Pulse 99   Resp 16   Ht 1.619 m (5' 3.75\")   Wt 94 kg (207 lb 4.8 oz)   SpO2 92%   BMI 35.86 kg/m   Estimated body mass index is 35.86 kg/m  as calculated from the following:    Height as of this encounter: 1.619 m (5' 3.75\").    Weight as of this encounter: 94 kg (207 lb 4.8 oz).  Medication Reconciliation: complete  Gege Walter LPN    "

## 2019-10-25 ENCOUNTER — ALLIED HEALTH/NURSE VISIT (OUTPATIENT)
Dept: EDUCATION SERVICES | Facility: OTHER | Age: 54
End: 2019-10-25
Attending: NURSE PRACTITIONER
Payer: MEDICARE

## 2019-10-25 VITALS
RESPIRATION RATE: 16 BRPM | BODY MASS INDEX: 35.03 KG/M2 | OXYGEN SATURATION: 92 % | WEIGHT: 205.2 LBS | HEART RATE: 92 BPM | SYSTOLIC BLOOD PRESSURE: 108 MMHG | DIASTOLIC BLOOD PRESSURE: 86 MMHG | HEIGHT: 64 IN

## 2019-10-25 DIAGNOSIS — Z71.6 TOBACCO ABUSE COUNSELING: ICD-10-CM

## 2019-10-25 DIAGNOSIS — Z72.0 TOBACCO ABUSE: ICD-10-CM

## 2019-10-25 DIAGNOSIS — E11.65 TYPE 2 DIABETES MELLITUS WITH HYPERGLYCEMIA, WITH LONG-TERM CURRENT USE OF INSULIN (H): ICD-10-CM

## 2019-10-25 DIAGNOSIS — Z79.4 TYPE 2 DIABETES MELLITUS WITH HYPERGLYCEMIA, WITH LONG-TERM CURRENT USE OF INSULIN (H): ICD-10-CM

## 2019-10-25 PROCEDURE — 99212 OFFICE O/P EST SF 10 MIN: CPT | Performed by: NURSE PRACTITIONER

## 2019-10-25 PROCEDURE — G0463 HOSPITAL OUTPT CLINIC VISIT: HCPCS

## 2019-10-25 ASSESSMENT — PAIN SCALES - GENERAL: PAINLEVEL: NO PAIN (0)

## 2019-10-25 ASSESSMENT — MIFFLIN-ST. JEOR: SCORE: 1501.81

## 2019-10-25 NOTE — PATIENT INSTRUCTIONS
HOW TO QUIT SMOKING  Smoking is one of the hardest habits to break. About half of all those who have ever smoked have been able to quit, and most of those (about 70%) who still smoke want to quit. Here are some of the best ways to stop smoking.     KEEP TRYING:  It takes most smokers about 8 tries before they are finally able to fully quit. So, the more often you try and fail, the better your chance of quitting the next time! So, don't give up!    GO COLD TURKEY:  Most ex-smokers quit cold turkey. Trying to cut back gradually doesn't seem to work as well, perhaps because it continues the smoking habit. Also, it is possible to fool yourself by inhaling more while smoking fewer cigarettes. This results in the same amount of nicotine in your body!    GET SUPPORT:  Support programs can make an important difference, especially for the heavy smoker. These groups offer lectures, methods to change your behavior and peer support. Call the free national Quitline for more information. 800-QUIT-NOW (993-871-0058). Low-cost or free programs are offered by many hospitals, local chapters of the American Lung Association (803-855-0262) and the American Cancer Society (299-979-2044). Support at home is important too. Non-smokers can help by offering praise and encouragement. If the smoker fails to quit, encourage them to try again!    OVER-THE-COUNTER MEDICINES:  For those who can't quit on their own, Nicotine Replacement Therapy (NRT) may make quitting much easier. Certain aids such as the nicotine patch, gum and lozenge are available without a prescription. However, it is best to use these under the guidance of your doctor. The skin patch provides a steady supply of nicotine to the body. Nicotine gum and lozenge gives temporary bursts of low levels of nicotine. Both methods take the edge off the craving for cigarettes. WARNING: If you feel symptoms of nicotine overdose, such as nausea, vomiting, dizziness, weakness, or fast  heartbeat, stop using these and see your doctor.    PRESCRIPTION MEDICINES:  After evaluating your smoking patterns and prior attempts at quitting, your doctor may offer a prescription medicine such as bupropion (Zyban, Wellbutrin), varenicline (Chantix, Champix), a niocotine inhaler or nasal spray. Each has its unique advantage and side effects which your doctor can review with you.    HEALTH BENEFITS OF QUITTING:  The benefits of quitting start right away and keep improving the longer you go without smokin minutes: blood pressure and pulse return to normal  8 hours: oxygen levels return to normal  2 days: ability to smell and taste begins to improve as damaged nerves start to regrow  2-3 weeks: circulation and lung function improves  1-9 months: decreased cough, congestion and shortness of breath; less tired  1 year: risk of heart attack decreases by half  5 years: risk of lung cancer decreases by half; risk of stroke becomes the same as a non-smoker  For information about how to quit smoking, visit the following links:  National Cancer [place] ,   Clearing the Air, Quit Smoking Today   - an online booklet. http://www.smokefree.gov/pubs/clearing_the_air.pdf  Smokefree.gov http://smokefree.gov/  QuitNet http://www.quitnet.com/    9934-7849 Elisa Munson, [address] [place] [address], [place] [address]. All rights reserved. This information is not intended as a substitute for professional medical care. Always follow your healthcare professional's instructions.    The Benefits of Living Smoke Free  What do you want to gain from quitting? Check off some reasons to quit.  Health Benefits  ___ Reduce my risk of lung cancer, heart disease, chronic lung disease  ___ Have fewer wrinkles and softer skin  ___ Improve my sense of taste and smell  ___ For pregnant women--reduce the risk of having a miscarriage, stillbirth, premature birth, or low-birth-weight baby  Personal Benefits  ___ Feel more in control of my  life  ___ Have better-smelling hair, breath, clothes, home, and car  ___ Save time by not having to take smoke breaks, buy cigarettes, or hunt for a light  ___ Have whiter teeth  Family Benefits  ___ Reduce my children s respiratory tract infections  ___ Set a good example for my children  ___ Reduce my family s cancer risk  Financial Benefits  ___ Save hundreds of dollars each year that would be spent on cigarettes  ___ Save money on medical bills  ___ Save on life, health, and car insurance premiums    Those Dollars Add Up!  Cigarettes are expensive, and getting more expensive all the time. Do you realize how much money you are spending on cigarettes per year? What is the average amount you spend on a pack of cigarettes? What is the average number of packs that you smoke per day? Using your answers to these questions, fill in this formula to help you find out:  ($ _____ per pack) ×  ( _____ number of packs per day) × (365 days) =  $ _____ yearly cost of smoking  Besides tobacco, there are other costs, including extra cleaning bills and replacement costs for clothing and furniture; medical expenses for smoking-related illnesses; and higher health, life, and car insurance premiums.    Cigars and Pipes Count Too!  Cigars and pipes are also dangerous. So are smokeless (chewing) tobacco and snuff. All of these products contain nicotine, a highly addictive substance that has harmful effects on your body. Quitting smoking means giving up all tobacco products.      2567-9321 [facility], [address] [place] [address], [place] [id number]. All rights reserved. This information is not intended as a substitute for professional medical care. Always follow your healthcare professional's instructions.

## 2019-10-25 NOTE — PROGRESS NOTES
"Chief Complaint   Patient presents with     Diabetes     Pt is in for a FU on VGO and Bud.       Initial /86   Pulse 92   Resp 16   Ht 1.619 m (5' 3.75\")   Wt 93.1 kg (205 lb 3.2 oz)   SpO2 92%   BMI 35.50 kg/m   Estimated body mass index is 35.5 kg/m  as calculated from the following:    Height as of this encounter: 1.619 m (5' 3.75\").    Weight as of this encounter: 93.1 kg (205 lb 3.2 oz).  Medication Reconciliation: complete  Gege Walter LPN    "

## 2019-10-29 ENCOUNTER — ALLIED HEALTH/NURSE VISIT (OUTPATIENT)
Dept: EDUCATION SERVICES | Facility: OTHER | Age: 54
End: 2019-10-29
Attending: NURSE PRACTITIONER
Payer: MEDICARE

## 2019-10-29 VITALS
WEIGHT: 204.7 LBS | DIASTOLIC BLOOD PRESSURE: 75 MMHG | BODY MASS INDEX: 34.95 KG/M2 | HEART RATE: 88 BPM | HEIGHT: 64 IN | OXYGEN SATURATION: 95 % | SYSTOLIC BLOOD PRESSURE: 100 MMHG | RESPIRATION RATE: 16 BRPM

## 2019-10-29 DIAGNOSIS — E11.65 TYPE 2 DIABETES MELLITUS WITH HYPERGLYCEMIA, WITH LONG-TERM CURRENT USE OF INSULIN (H): ICD-10-CM

## 2019-10-29 DIAGNOSIS — Z71.6 TOBACCO ABUSE COUNSELING: ICD-10-CM

## 2019-10-29 DIAGNOSIS — Z72.0 TOBACCO ABUSE: ICD-10-CM

## 2019-10-29 DIAGNOSIS — Z79.4 TYPE 2 DIABETES MELLITUS WITH HYPERGLYCEMIA, WITH LONG-TERM CURRENT USE OF INSULIN (H): ICD-10-CM

## 2019-10-29 PROCEDURE — 99213 OFFICE O/P EST LOW 20 MIN: CPT | Performed by: NURSE PRACTITIONER

## 2019-10-29 PROCEDURE — G0463 HOSPITAL OUTPT CLINIC VISIT: HCPCS

## 2019-10-29 ASSESSMENT — MIFFLIN-ST. JEOR: SCORE: 1499.54

## 2019-10-29 ASSESSMENT — PAIN SCALES - GENERAL: PAINLEVEL: NO PAIN (0)

## 2019-10-29 NOTE — PROGRESS NOTES
SUBJECTIVE:  Marly Cannon, 54 year old, female presents with the following Chief Complaint(s) with HPI to follow:  Chief Complaint   Patient presents with     Diabetes        Diabetes Follow-up    Patient is checking blood sugars: 2.4x/day.  Results:  Highest: 468  Lowest: 120  Period average: 261     Values above, >180: 25  Values within goal (): 9  Values below goal, <70: 0        Symptoms of hypoglycemia (low blood sugar): none    Paresthesias (numbness or burning in feet) or sores: No    Diabetic eye exam within the last year: Yes    Breakfast eaten regularly: once in awhile     Patient counting carbs: Yes    Walking: almost every day when it's nice       HPI:  Marly's here today for the follow up regarding her Diabetes mellitus, Type 2.    Lab Results   Component Value Date    A1C 12.0 08/29/2019    A1C 11.0 05/29/2019    A1C 9.8 02/22/2019    A1C 10.9 11/21/2018    A1C 9.6 08/21/2018    A1C 9.6 08/21/2018     Current Diabetes medication:   1. V-go 20--using the bolus feature    Statin use: yes, simvastatin 20 mg daily    Marly's here today for a CGM download and possible insulin adjustment.    She reports the following: her CGM fell off and she forgot it at home.    Denies any issues with the V-go.   She was able to put it on without any issues    Denies any new health concerns.      Patient Active Problem List   Diagnosis     Type 2 diabetes, HbA1c goal < 7% (H)     ACP (advance care planning)     Stage 3 chronic kidney disease (H)     Pulmonary emphysema (H)     Hyperparathyroidism due to renal insufficiency (H)     Morbid obesity (H)     Vitamin D deficiency     Intellectual disability     Breast fibrocystic disorder     Tobacco abuse     Microalbuminuria due to type 2 diabetes mellitus (H)     H/O colonoscopy     [place] cardiac risk <10% in next 10 years     Hypomagnesemia     Tubular adenoma of colon     Hyperlipidemia, unspecified hyperlipidemia type     Essential hypertension     Callus  of foot       No past medical history on file.    Past Surgical History:   Procedure Laterality Date     COLONOSCOPY N/A 8/20/2015    Procedure: COLONOSCOPY;  Surgeon: Gentry Porter MD;  Location: [facility]     COLONOSCOPY N/A 9/21/2018    Procedure: COLONOSCOPY;  COLONOSCOPY WITH POLYPECTOMY;  Surgeon: Gentry Porter MD;  Location: [facility]       Family History   Problem Relation Age of Onset     Diabetes Mother      Diabetes Father      Hypertension Brother      Diabetes Sister        Social History     Tobacco Use     Smoking status: Current Every Day Smoker     Packs/day: 1.00     Years: 34.00     Pack years: 34.00     Types: Cigarettes     Start date: 1/1/1979     Smokeless tobacco: Never Used     Tobacco comment: Declined QP 10/2/19   Substance Use Topics     Alcohol use: No     Alcohol/week: 0.0 standard drinks       Current Outpatient Medications   Medication Sig Dispense Refill     Acetaminophen (TYLENOL PO) Take 325 mg by mouth every 6 hours as needed        ammonium lactate (LAC-HYDRIN) 12 % cream Apply topically 2 times daily       aspirin 81 MG EC tablet Take 1 tablet (81 mg) by mouth daily 90 tablet 3     atorvastatin (LIPITOR) 40 MG tablet Take 1 tablet (40 mg) by mouth daily 90 tablet 3     blood glucose (NO BRAND SPECIFIED) test strip Use to test blood sugar 4 times daily or as directed. 300 strip 11     budesonide-formoterol (SYMBICORT) 160-4.5 MCG/ACT Inhaler Inhale 2 puffs into the lungs 2 times daily 1 Inhaler 3     Cholecalciferol (VITAMIN D-3) 1000 units CAPS Take 2,000 Units by mouth daily 90 capsule 11     Cyclobenzaprine HCl (FLEXERIL PO) Take 10 mg by mouth 3 times daily as needed for muscle spasms       hydrocortisone 2.5 % cream Apply topically 2 times daily       insulin pen needle (32G X 4 MM) 32G X 4 MM miscellaneous Use 1 pen needles daily 100 each 3     Ipratropium-Albuterol (COMBIVENT RESPIMAT)  MCG/ACT inhaler Inhale 1 puff into the lungs 4 times daily       lisinopril  "(PRINIVIL/ZESTRIL) 40 MG tablet Take 1 tablet (40 mg) by mouth daily 30 tablet 3     nystatin (MYCOSTATIN) 037292 UNIT/GM external powder Apply topically 3 times daily 60 g 3     ONETOUCH DELICA LANCETS 33G MISC 1 each 3 times daily 100 each 10     order for DME Women briefs 50 each 1     primidone (MYSOLINE) 50 MG tablet Take 1 tablet (50 mg) by mouth At Bedtime 30 tablet 0     Semaglutide,0.25 or 0.5MG/DOS, (OZEMPIC, 0.25 OR 0.5 MG/DOSE,) 2 MG/1.5ML SOPN Inject 0.5 mg Subcutaneous every 7 days       triamcinolone (KENALOG) 0.1 % cream Apply topically 2 times daily       wearable insulin delivery device (V-GO 20) kit Insulin delivery device to be changed every 24 hours 30 each 4       No Known Allergies    REVIEW OF SYSTEMS  Skin: negative  Eyes: negative   Ears/Nose/Throat: glasses; negative  Respiratory: No shortness of breath, cough, or hemoptysis;   Cardiovascular: negative  Gastrointestinal: negative  Genitourinary: occasionally with high BG  Musculoskeletal: negative; every Monday ([facility]    Neurologic: negative  Psychiatric: negative  Hematologic/Lymphatic/Immunologic: continued  Endocrine: positive for diabetes; sweaty spells--none lately     OBJECTIVE:  /75   Pulse 88   Resp 16   Ht 1.619 m (5' 3.75\")   Wt 92.9 kg (204 lb 11.2 oz)   SpO2 95%   BMI 35.41 kg/m    Constitutional: alert and no distress  Musculoskeletal: extremities normal- no gross deformities noted and gait normal  Psychiatric: mentation appears normal for baseline and affect normal/bright    LABS  No results found for any visits on 10/29/19.    ASSESSMENT / PLAN:  (E11.65,  Z79.4) Type 2 diabetes mellitus with hyperglycemia, with long-term current use of insulin (H)  Comment: noted Marly's BGs since starting the V-go--Awesome!! (without the bolus feature: 120-294; using the bolus feature: 131-252)    Plan: wearable insulin delivery device (V-GO 20) kit         Keep using the V-go--it's working  Marly was " able to fill the v-gos without any issues    Called 's--they only have the Vgo 30.     (Z72.0) Tobacco abuse  Comment: noted  Plan: Tobacco Cessation - Order to Satisfy Health         Maintenance                Marly's aware to let us know when she's ready to quit smoking.  Discussed the dangers of smoking with diabetes    (Z71.6) Tobacco abuse counseling  Comment: noted  Plan: as mentioned above    Follow up in 3 weeks  Sooner if needed    Patient Instructions   Continue working on healthy eating and moving (start low and slow, work up to 30 min, 5x/week)    BG goals:  Fasting and before meals <130, >70  2 hour after eating <180    We only need 1/2 of these numbers to be within target then your A1c will be within target    Medication changes   None, keep using the V-go    Follow up   Monday, November 4th--nurse only      Call me sooner if any problems/concerns and/or questions develop including consistent low BGs <70 or consistent high BGs >200  [phone] (Unit Coordinator)    [phone] (Nurse)    HOW TO QUIT SMOKING  Smoking is one of the hardest habits to break. About half of all those who have ever smoked have been able to quit, and most of those (about 70%) who still smoke want to quit. Here are some of the best ways to stop smoking.     KEEP TRYING:  It takes most smokers about 8 tries before they are finally able to fully quit. So, the more often you try and fail, the better your chance of quitting the next time! So, don't give up!    GO COLD TURKEY:  Most ex-smokers quit cold turkey. Trying to cut back gradually doesn't seem to work as well, perhaps because it continues the smoking habit. Also, it is possible to fool yourself by inhaling more while smoking fewer cigarettes. This results in the same amount of nicotine in your body!    GET SUPPORT:  Support programs can make an important difference, especially for the heavy smoker. These groups offer lectures, methods to change your behavior and  peer support. Call the free national Quitline for more information. 800-QUIT-NOW (498-449-8879). Low-cost or free programs are offered by many hospitals, local chapters of the American Lung Association (856-060-3896) and the American Cancer Society (689-540-5183). Support at home is important too. Non-smokers can help by offering praise and encouragement. If the smoker fails to quit, encourage them to try again!    OVER-THE-COUNTER MEDICINES:  For those who can't quit on their own, Nicotine Replacement Therapy (NRT) may make quitting much easier. Certain aids such as the nicotine patch, gum and lozenge are available without a prescription. However, it is best to use these under the guidance of your doctor. The skin patch provides a steady supply of nicotine to the body. Nicotine gum and lozenge gives temporary bursts of low levels of nicotine. Both methods take the edge off the craving for cigarettes. WARNING: If you feel symptoms of nicotine overdose, such as nausea, vomiting, dizziness, weakness, or fast heartbeat, stop using these and see your doctor.    PRESCRIPTION MEDICINES:  After evaluating your smoking patterns and prior attempts at quitting, your doctor may offer a prescription medicine such as bupropion (Zyban, Wellbutrin), varenicline (Chantix, Champix), a niocotine inhaler or nasal spray. Each has its unique advantage and side effects which your doctor can review with you.    HEALTH BENEFITS OF QUITTING:  The benefits of quitting start right away and keep improving the longer you go without smokin minutes: blood pressure and pulse return to normal  8 hours: oxygen levels return to normal  2 days: ability to smell and taste begins to improve as damaged nerves start to regrow  2-3 weeks: circulation and lung function improves  1-9 months: decreased cough, congestion and shortness of breath; less tired  1 year: risk of heart attack decreases by half  5 years: risk of lung cancer decreases by half;  risk of stroke becomes the same as a non-smoker  For information about how to quit smoking, visit the following links:  National Cancer [place] ,   Clearing the Air, Quit Smoking Today   - an online booklet. http://www.smokefree.gov/pubs/clearing_the_air.pdf  Smokefree.gov http://smokefree.gov/  QuitNet http://www.quitnet.com/    6240-0966 Elisa Munson, [address] [place] [address], [place] [address]. All rights reserved. This information is not intended as a substitute for professional medical care. Always follow your healthcare professional's instructions.    The Benefits of Living Smoke Free  What do you want to gain from quitting? Check off some reasons to quit.  Health Benefits  ___ Reduce my risk of lung cancer, heart disease, chronic lung disease  ___ Have fewer wrinkles and softer skin  ___ Improve my sense of taste and smell  ___ For pregnant women--reduce the risk of having a miscarriage, stillbirth, premature birth, or low-birth-weight baby  Personal Benefits  ___ Feel more in control of my life  ___ Have better-smelling hair, breath, clothes, home, and car  ___ Save time by not having to take smoke breaks, buy cigarettes, or hunt for a light  ___ Have whiter teeth  Family Benefits  ___ Reduce my children s respiratory tract infections  ___ Set a good example for my children  ___ Reduce my family s cancer risk  Financial Benefits  ___ Save hundreds of dollars each year that would be spent on cigarettes  ___ Save money on medical bills  ___ Save on life, health, and car insurance premiums    Those Dollars Add Up!  Cigarettes are expensive, and getting more expensive all the time. Do you realize how much money you are spending on cigarettes per year? What is the average amount you spend on a pack of cigarettes? What is the average number of packs that you smoke per day? Using your answers to these questions, fill in this formula to help you find out:  ($ _____ per pack) ×  ( _____ number of packs per day) ×  (365 days) =  $ _____ yearly cost of smoking  Besides tobacco, there are other costs, including extra cleaning bills and replacement costs for clothing and furniture; medical expenses for smoking-related illnesses; and higher health, life, and car insurance premiums.    Cigars and Pipes Count Too!  Cigars and pipes are also dangerous. So are smokeless (chewing) tobacco and snuff. All of these products contain nicotine, a highly addictive substance that has harmful effects on your body. Quitting smoking means giving up all tobacco products.      7660-6931 Elisa [facility], [address] [place] [address]. All rights reserved. This information is not intended as a substitute for professional medical care. Always follow your healthcare professional's instructions.    Time: 35 minutes  Barrier: see [facility]  Willingness to learn: accepting    Courtney PINTO [facility]  Disease Management    Cc: Amberly Whyte NP    With the electronic record, we can now more quickly and easily track our patient diabetic goals. Our diabetes clinical review is in progress and these are the indicators we are monitoring for good diabetes health.     1.) HbA1C less than 7 (measurement of your average blood sugars)  2.) Blood Pressure less than 140/80  3.) LDL less than 100 (bad cholesterol)  4.) HbA1C is checked in the last 6 months and below 7% (more frequently if not at goal or adjusting medications)  5.) LDL is checked in the last 12 months (more frequently if not at goal or adjusting medications)  6.) Taking one baby aspirin daily (unless otherwise instructed)  7.) No tobacco use  8) Statin use     You have achieved 6 out of 8 of these and I am encouraging you to come in and get tuned up to achieve 8 out of 8!  Here is what you have achieved so far in my goals for you:  1.) HbA1C  less than 7:                              NO  Your last  HbA1C :  Lab Results   Component Value Date    A1C 12.0 08/29/2019    A1C 11.0 05/29/2019    A1C  9.8 02/22/2019    A1C 10.9 11/21/2018    A1C 9.6 08/21/2018    A1C 9.6 08/21/2018     2.) Blood Pressure less than 140/80:       YES      Your last    BP Readings from Last 1 Encounters:   10/29/19 100/75     3.) LDL less than 100:                              YES      Your last     LDL Cholesterol Calculated   Date Value Ref Range Status   03/29/2019 72 <100 mg/dL Final       4.) Checked HbA1C in the past 6 months: YES      5.) Checked LDL in the past 12 months:    YES      6.) Taking one aspirin daily:                       YES     7.) No tobacco use:                                        NO   8.) Statin use      YES

## 2019-10-29 NOTE — PATIENT INSTRUCTIONS
Continue working on healthy eating and moving (start low and slow, work up to 30 min, 5x/week)    BG goals:  Fasting and before meals <130, >70  2 hour after eating <180    We only need 1/2 of these numbers to be within target then your A1c will be within target    Medication changes   None, keep using the V-go    Follow up   Monday, November 4th--nurse only      Call me sooner if any problems/concerns and/or questions develop including consistent low BGs <70 or consistent high BGs >200  [phone] (Unit Coordinator)    [phone] (Nurse)    HOW TO QUIT SMOKING  Smoking is one of the hardest habits to break. About half of all those who have ever smoked have been able to quit, and most of those (about 70%) who still smoke want to quit. Here are some of the best ways to stop smoking.     KEEP TRYING:  It takes most smokers about 8 tries before they are finally able to fully quit. So, the more often you try and fail, the better your chance of quitting the next time! So, don't give up!    GO COLD TURKEY:  Most ex-smokers quit cold turkey. Trying to cut back gradually doesn't seem to work as well, perhaps because it continues the smoking habit. Also, it is possible to fool yourself by inhaling more while smoking fewer cigarettes. This results in the same amount of nicotine in your body!    GET SUPPORT:  Support programs can make an important difference, especially for the heavy smoker. These groups offer lectures, methods to change your behavior and peer support. Call the free national Quitline for more information. 800-QUIT-NOW (986-556-4724). Low-cost or free programs are offered by many hospitals, local chapters of the American Lung Association (032-694-4220) and the American Cancer Society (754-539-3798). Support at home is important too. Non-smokers can help by offering praise and encouragement. If the smoker fails to quit, encourage them to try again!    OVER-THE-COUNTER MEDICINES:  For those who can't quit on  their own, Nicotine Replacement Therapy (NRT) may make quitting much easier. Certain aids such as the nicotine patch, gum and lozenge are available without a prescription. However, it is best to use these under the guidance of your doctor. The skin patch provides a steady supply of nicotine to the body. Nicotine gum and lozenge gives temporary bursts of low levels of nicotine. Both methods take the edge off the craving for cigarettes. WARNING: If you feel symptoms of nicotine overdose, such as nausea, vomiting, dizziness, weakness, or fast heartbeat, stop using these and see your doctor.    PRESCRIPTION MEDICINES:  After evaluating your smoking patterns and prior attempts at quitting, your doctor may offer a prescription medicine such as bupropion (Zyban, Wellbutrin), varenicline (Chantix, Champix), a niocotine inhaler or nasal spray. Each has its unique advantage and side effects which your doctor can review with you.    HEALTH BENEFITS OF QUITTING:  The benefits of quitting start right away and keep improving the longer you go without smokin minutes: blood pressure and pulse return to normal  8 hours: oxygen levels return to normal  2 days: ability to smell and taste begins to improve as damaged nerves start to regrow  2-3 weeks: circulation and lung function improves  1-9 months: decreased cough, congestion and shortness of breath; less tired  1 year: risk of heart attack decreases by half  5 years: risk of lung cancer decreases by half; risk of stroke becomes the same as a non-smoker  For information about how to quit smoking, visit the following links:  National Cancer [place] ,   Clearing the Air, Quit Smoking Today   - an online booklet. http://www.smokefree.gov/pubs/clearing_the_air.pdf  Smokefree.gov http://smokefree.gov/  QuitNet http://www.quitnet.com/    8278-3501 Elisa Munson, [address] [facility], [place] [address]. All rights reserved. This information is not intended as a substitute for  professional medical care. Always follow your healthcare professional's instructions.    The Benefits of Living Smoke Free  What do you want to gain from quitting? Check off some reasons to quit.  Health Benefits  ___ Reduce my risk of lung cancer, heart disease, chronic lung disease  ___ Have fewer wrinkles and softer skin  ___ Improve my sense of taste and smell  ___ For pregnant women--reduce the risk of having a miscarriage, stillbirth, premature birth, or low-birth-weight baby  Personal Benefits  ___ Feel more in control of my life  ___ Have better-smelling hair, breath, clothes, home, and car  ___ Save time by not having to take smoke breaks, buy cigarettes, or hunt for a light  ___ Have whiter teeth  Family Benefits  ___ Reduce my children s respiratory tract infections  ___ Set a good example for my children  ___ Reduce my family s cancer risk  Financial Benefits  ___ Save hundreds of dollars each year that would be spent on cigarettes  ___ Save money on medical bills  ___ Save on life, health, and car insurance premiums    Those Dollars Add Up!  Cigarettes are expensive, and getting more expensive all the time. Do you realize how much money you are spending on cigarettes per year? What is the average amount you spend on a pack of cigarettes? What is the average number of packs that you smoke per day? Using your answers to these questions, fill in this formula to help you find out:  ($ _____ per pack) ×  ( _____ number of packs per day) × (365 days) =  $ _____ yearly cost of smoking  Besides tobacco, there are other costs, including extra cleaning bills and replacement costs for clothing and furniture; medical expenses for smoking-related illnesses; and higher health, life, and car insurance premiums.    Cigars and Pipes Count Too!  Cigars and pipes are also dangerous. So are smokeless (chewing) tobacco and snuff. All of these products contain nicotine, a highly addictive substance that has harmful effects  on your body. Quitting smoking means giving up all tobacco products.      1412-1583 Elisa Munson, [address] [place] [address], [place] [address]. All rights reserved. This information is not intended as a substitute for professional medical care. Always follow your healthcare professional's instructions.

## 2019-10-29 NOTE — PROGRESS NOTES
"Chief Complaint   Patient presents with     Diabetes       Initial /75   Pulse 88   Resp 16   Ht 1.619 m (5' 3.75\")   Wt 92.9 kg (204 lb 11.2 oz)   SpO2 95%   BMI 35.41 kg/m   Estimated body mass index is 35.41 kg/m  as calculated from the following:    Height as of this encounter: 1.619 m (5' 3.75\").    Weight as of this encounter: 92.9 kg (204 lb 11.2 oz).  Medication Reconciliation: complete  Gege Walter LPN    "

## 2019-10-30 ENCOUNTER — TRANSFERRED RECORDS (OUTPATIENT)
Dept: HEALTH INFORMATION MANAGEMENT | Facility: HOSPITAL | Age: 54
End: 2019-10-30

## 2019-10-30 ENCOUNTER — TRANSFERRED RECORDS (OUTPATIENT)
Dept: HEALTH INFORMATION MANAGEMENT | Facility: CLINIC | Age: 54
End: 2019-10-30

## 2019-10-30 LAB — HBA1C MFR BLD: 9.8 % (ref 4–5.6)

## 2019-10-31 NOTE — PROGRESS NOTES
Josie, I just received email from Dr. Eason's  and she stated patient is due to see Dr. Eason and she see's him at the [facility]. She is going to get ahold of patient to get schedule.

## 2019-11-05 ENCOUNTER — PATIENT OUTREACH (OUTPATIENT)
Dept: CARE COORDINATION | Facility: OTHER | Age: 54
End: 2019-11-05

## 2019-11-05 ASSESSMENT — ACTIVITIES OF DAILY LIVING (ADL): DEPENDENT_IADLS:: INDEPENDENT

## 2019-11-05 NOTE — PROGRESS NOTES
SUBJECTIVE:  Marly Cannon, 54 year old, female presents with the following Chief Complaint(s) with HPI to follow:  Chief Complaint   Patient presents with     Diabetes     Pt is in for a fu after starting VGO and Bud.        Diabetes Follow-up    Patient is checking blood sugars: professional CGM.  Results:  Average glucose: 221    Time in range:   >180: 67%  : 33%  <70: 0      Symptoms of hypoglycemia (low blood sugar): none    Paresthesias (numbness or burning in feet) or sores: No    Diabetic eye exam within the last year: Yes    Breakfast eaten regularly: once in awhile     Patient counting carbs: Yes    Walking: almost every day when it's nice       HPI:  Marly's here today for the follow up regarding her Diabetes mellitus, Type 2.    Lab Results   Component Value Date    A1C 12.0 08/29/2019    A1C 11.0 05/29/2019    A1C 9.8 02/22/2019    A1C 10.9 11/21/2018    A1C 9.6 08/21/2018    A1C 9.6 08/21/2018     Current Diabetes medication:   1.  Vgo 20  ASA use: yes, 81 mg daily  Statin use: yes, simvastatin 20 mg daily    Marly's here today for a follow up from her Vgo start.  Denies any issues with it.    It stayed in place.       Denies any new health concerns.        Patient Active Problem List   Diagnosis     Type 2 diabetes, HbA1c goal < 7% (H)     ACP (advance care planning)     Stage 3 chronic kidney disease (H)     Pulmonary emphysema (H)     Hyperparathyroidism due to renal insufficiency (H)     Morbid obesity (H)     Vitamin D deficiency     Intellectual disability     Breast fibrocystic disorder     Tobacco abuse     Microalbuminuria due to type 2 diabetes mellitus (H)     H/O colonoscopy     [place] cardiac risk <10% in next 10 years     Hypomagnesemia     Tubular adenoma of colon     Hyperlipidemia, unspecified hyperlipidemia type     Essential hypertension     Callus of foot       No past medical history on file.    Past Surgical History:   Procedure Laterality Date     COLONOSCOPY  N/A 8/20/2015    Procedure: COLONOSCOPY;  Surgeon: Gentry Porter MD;  Location: [facility]     COLONOSCOPY N/A 9/21/2018    Procedure: COLONOSCOPY;  COLONOSCOPY WITH POLYPECTOMY;  Surgeon: Gentry Porter MD;  Location: [facility]       Family History   Problem Relation Age of Onset     Diabetes Mother      Diabetes Father      Hypertension Brother      Diabetes Sister        Social History     Tobacco Use     Smoking status: Current Every Day Smoker     Packs/day: 1.00     Years: 34.00     Pack years: 34.00     Types: Cigarettes     Start date: 1/1/1979     Smokeless tobacco: Never Used     Tobacco comment: Declined QP 10/2/19   Substance Use Topics     Alcohol use: No     Alcohol/week: 0.0 standard drinks       Current Outpatient Medications   Medication Sig Dispense Refill     Acetaminophen (TYLENOL PO) Take 325 mg by mouth every 6 hours as needed        ammonium lactate (LAC-HYDRIN) 12 % cream Apply topically 2 times daily       aspirin 81 MG EC tablet Take 1 tablet (81 mg) by mouth daily 90 tablet 3     atorvastatin (LIPITOR) 40 MG tablet Take 1 tablet (40 mg) by mouth daily 90 tablet 3     blood glucose (NO BRAND SPECIFIED) test strip Use to test blood sugar 4 times daily or as directed. 300 strip 11     budesonide-formoterol (SYMBICORT) 160-4.5 MCG/ACT Inhaler Inhale 2 puffs into the lungs 2 times daily 1 Inhaler 3     Cholecalciferol (VITAMIN D-3) 1000 units CAPS Take 2,000 Units by mouth daily 90 capsule 11     Cyclobenzaprine HCl (FLEXERIL PO) Take 10 mg by mouth 3 times daily as needed for muscle spasms       hydrocortisone 2.5 % cream Apply topically 2 times daily       insulin pen needle (32G X 4 MM) 32G X 4 MM miscellaneous Use 1 pen needles daily 100 each 3     Ipratropium-Albuterol (COMBIVENT RESPIMAT)  MCG/ACT inhaler Inhale 1 puff into the lungs 4 times daily       lisinopril (PRINIVIL/ZESTRIL) 40 MG tablet Take 1 tablet (40 mg) by mouth daily 30 tablet 3     nystatin (MYCOSTATIN) 661673  "UNIT/GM external powder Apply topically 3 times daily 60 g 3     ONETOUCH DELICA LANCETS 33G MISC 1 each 3 times daily 100 each 10     order for DME Women briefs 50 each 1     primidone (MYSOLINE) 50 MG tablet Take 1 tablet (50 mg) by mouth At Bedtime 30 tablet 0     Semaglutide,0.25 or 0.5MG/DOS, (OZEMPIC, 0.25 OR 0.5 MG/DOSE,) 2 MG/1.5ML SOPN Inject 0.5 mg Subcutaneous every 7 days       triamcinolone (KENALOG) 0.1 % cream Apply topically 2 times daily       wearable insulin delivery device ([employer] 20) kit Insulin delivery device to be changed every 24 hours 30 each 4       No Known Allergies    REVIEW OF SYSTEMS  Skin: negative  Eyes: negative   Ears/Nose/Throat: glasses; negative  Respiratory: No shortness of breath, cough, or hemoptysis;   Cardiovascular: negative  Gastrointestinal: negative  Genitourinary: occasionally with high BG  Musculoskeletal: negative; every Monday (through [facility]going to the     Neurologic: negative  Psychiatric: negative  Hematologic/Lymphatic/Immunologic: continued  Endocrine: positive for diabetes; sweaty spells--none lately     OBJECTIVE:  /86   Pulse 99   Resp 16   Ht 1.619 m (5' 3.75\")   Wt 94 kg (207 lb 4.8 oz)   SpO2 92%   BMI 35.86 kg/m    Constitutional: alert and no distress  Musculoskeletal: extremities normal- no gross deformities noted and gait normal  Psychiatric: mentation appears normal for baseline and affect normal/bright    LABS  No results found for any visits on 10/24/19.    ASSESSMENT / PLAN:  (E11.9,  Z79.4) Diabetes mellitus type 2, insulin dependent (H)  Comment: noted--BG average down by 100 pt    10/23: 282, 0, 0, 100  10/24: 175, 57, 0, 43    Plan:   Keep using the Vgo  Showed Marly how to use the bolus feature.   Encouraged her to push it 1-2x/meal      (Z71.6) Tobacco abuse counseling  Comment: noted  Plan: Tobacco Cessation - Order to Satisfy Health         Maintenance                 Marly's aware to let us know when she's ready to quit " smoking.  Discussed the dangers of smoking with diabetes    Follow up tomorrow  Sooner if needed    Patient Instructions   Keep using V-go as directed.   It's working    Time: 25 minutes  Barrier: see [facility]  Willingness to learn: accepting    Courtney PINTO [facility]  Disease Management    Cc: Amberly Whyte NP    With the electronic record, we can now more quickly and easily track our patient diabetic goals. Our diabetes clinical review is in progress and these are the indicators we are monitoring for good diabetes health.     1.) HbA1C less than 7 (measurement of your average blood sugars)  2.) Blood Pressure less than 140/80  3.) LDL less than 100 (bad cholesterol)  4.) HbA1C is checked in the last 6 months and below 7% (more frequently if not at goal or adjusting medications)  5.) LDL is checked in the last 12 months (more frequently if not at goal or adjusting medications)  6.) Taking one baby aspirin daily (unless otherwise instructed)  7.) No tobacco use  8) Statin use     You have achieved 6 out of 8 of these and I am encouraging you to come in and get tuned up to achieve 8 out of 8!  Here is what you have achieved so far in my goals for you:  1.) HbA1C  less than 7:                              NO  Your last  HbA1C :  Lab Results   Component Value Date    A1C 12.0 08/29/2019    A1C 11.0 05/29/2019    A1C 9.8 02/22/2019    A1C 10.9 11/21/2018    A1C 9.6 08/21/2018    A1C 9.6 08/21/2018     2.) Blood Pressure less than 140/80:       YES      Your last    BP Readings from Last 1 Encounters:   10/29/19 100/75     3.) LDL less than 100:                              YES      Your last     LDL Cholesterol Calculated   Date Value Ref Range Status   03/29/2019 72 <100 mg/dL Final       4.) Checked HbA1C in the past 6 months: YES      5.) Checked LDL in the past 12 months:    YES      6.) Taking one aspirin daily:                       YES     7.) No tobacco use:                                        NO   8.) Statin  use      YES

## 2019-11-05 NOTE — TELEPHONE ENCOUNTER
Clinic Care Coordination Contact  Care Team Conversations    Outgoing call to patient today.  She cancelled her appointment last week 10/30 with Courtney Chaudhary NP and has not yet rescheduled.  She was actually seen in [place] at [facility] on 10/30/19. Care Everywhere records have been downloaded for provider to review.      Pt reports she IS, in fact, getting the V-Go and it looks like the endocrinologist at [facility] did the PA on this.  Also is reporting blood glucose readings in the 100's-200's.      Pt is rescheduled to follow-up with Courtney Chaudhary NP next week on 11/14.  Requires close follow-up for now since the V-Go is new for her, however, it sounds like things are going well.    Lauren Pipkin, BS-RN   CHF and General Care Coordinator  [phone] Option # 1

## 2019-11-05 NOTE — PROGRESS NOTES
SUBJECTIVE:  Marly Cannon, 54 year old, female presents with the following Chief Complaint(s) with HPI to follow:  Chief Complaint   Patient presents with     Diabetes     Pt is in for a FU on VGO and Bud.        Diabetes Follow-up    Patient is checking blood sugars: professional CGM.  Results:  Average glucose: 226    Time in range  >180: 72%  : 28%  <70: 0      Symptoms of hypoglycemia (low blood sugar): none    Paresthesias (numbness or burning in feet) or sores: No    Diabetic eye exam within the last year: Yes    Breakfast eaten regularly: once in awhile     Patient counting carbs: Yes    Walking: almost every day when it's nice       HPI:  Marly's here today for the follow up regarding her Diabetes mellitus, Type 2.    Lab Results   Component Value Date    A1C 12.0 08/29/2019    A1C 11.0 05/29/2019    A1C 9.8 02/22/2019    A1C 10.9 11/21/2018    A1C 9.6 08/21/2018    A1C 9.6 08/21/2018     Current Diabetes medication:   1.  Vgo 20  ASA use: yes, 81 mg daily  Statin use: yes, simvastatin 20 mg daily    Marly's here today for a follow up after the Vgo start.   Denies any issues.      Denies any new health concerns.      Josie KABA, RN Care Coordinator in today's appt      Patient Active Problem List   Diagnosis     Type 2 diabetes, HbA1c goal < 7% (H)     ACP (advance care planning)     Stage 3 chronic kidney disease (H)     Pulmonary emphysema (H)     Hyperparathyroidism due to renal insufficiency (H)     Morbid obesity (H)     Vitamin D deficiency     Intellectual disability     Breast fibrocystic disorder     Tobacco abuse     Microalbuminuria due to type 2 diabetes mellitus (H)     H/O colonoscopy     [place] cardiac risk <10% in next 10 years     Hypomagnesemia     Tubular adenoma of colon     Hyperlipidemia, unspecified hyperlipidemia type     Essential hypertension     Callus of foot       No past medical history on file.    Past Surgical History:   Procedure Laterality Date      COLONOSCOPY N/A 8/20/2015    Procedure: COLONOSCOPY;  Surgeon: Gentry Porter MD;  [facility]: [facility]     COLONOSCOPY N/A 9/21/2018    Procedure: COLONOSCOPY;  COLONOSCOPY WITH POLYPECTOMY;  Surgeon: Gentry Porter MD;  Location: [facility]       Family History   Problem Relation Age of Onset     Diabetes Mother      Diabetes Father      Hypertension Brother      Diabetes Sister        Social History     Tobacco Use     Smoking status: Current Every Day Smoker     Packs/day: 1.00     Years: 34.00     Pack years: 34.00     Types: Cigarettes     Start date: 1/1/1979     Smokeless tobacco: Never Used     Tobacco comment: Declined QP 10/2/19   Substance Use Topics     Alcohol use: No     Alcohol/week: 0.0 standard drinks       Current Outpatient Medications   Medication Sig Dispense Refill     Acetaminophen (TYLENOL PO) Take 325 mg by mouth every 6 hours as needed        ammonium lactate (LAC-HYDRIN) 12 % cream Apply topically 2 times daily       aspirin 81 MG EC tablet Take 1 tablet (81 mg) by mouth daily 90 tablet 3     atorvastatin (LIPITOR) 40 MG tablet Take 1 tablet (40 mg) by mouth daily 90 tablet 3     blood glucose (NO BRAND SPECIFIED) test strip Use to test blood sugar 4 times daily or as directed. 300 strip 11     budesonide-formoterol (SYMBICORT) 160-4.5 MCG/ACT Inhaler Inhale 2 puffs into the lungs 2 times daily 1 Inhaler 3     Cholecalciferol (VITAMIN D-3) 1000 units CAPS Take 2,000 Units by mouth daily 90 capsule 11     Cyclobenzaprine HCl (FLEXERIL PO) Take 10 mg by mouth 3 times daily as needed for muscle spasms       hydrocortisone 2.5 % cream Apply topically 2 times daily       insulin pen needle (32G X 4 MM) 32G X 4 MM miscellaneous Use 1 pen needles daily 100 each 3     Ipratropium-Albuterol (COMBIVENT RESPIMAT)  MCG/ACT inhaler Inhale 1 puff into the lungs 4 times daily       lisinopril (PRINIVIL/ZESTRIL) 40 MG tablet Take 1 tablet (40 mg) by mouth daily 30 tablet 3     nystatin  "(MYCOSTATIN) 064556 UNIT/GM external powder Apply topically 3 times daily 60 g 3     ONETOUCH DELICA LANCETS 33G MISC 1 each 3 times daily 100 each 10     order for DME Women briefs 50 each 1     primidone (MYSOLINE) 50 MG tablet Take 1 tablet (50 mg) by mouth At Bedtime 30 tablet 0     Semaglutide,0.25 or 0.5MG/DOS, (OZEMPIC, 0.25 OR 0.5 MG/DOSE,) 2 MG/1.5ML SOPN Inject 0.5 mg Subcutaneous every 7 days       triamcinolone (KENALOG) 0.1 % cream Apply topically 2 times daily       wearable insulin delivery device (V-GO 20) kit Insulin delivery device to be changed every 24 hours 30 each 4       No Known Allergies    REVIEW OF SYSTEMS  Skin: negative  Eyes: negative   Ears/Nose/Throat: glasses; negative  Respiratory: No shortness of breath, cough, or hemoptysis;   Cardiovascular: negative  Gastrointestinal: negative  Genitourinary: occasionally with high BG  Musculoskeletal: negative; every Monday [facility]    Neurologic: negative  Psychiatric: negative  Hematologic/Lymphatic/Immunologic: continued  Endocrine: positive for diabetes; sweaty spells--none lately     OBJECTIVE:  /86   Pulse 92   Resp 16   Ht 1.619 m (5' 3.75\")   Wt 93.1 kg (205 lb 3.2 oz)   SpO2 92%   BMI 35.50 kg/m    Constitutional: alert and no distress  Musculoskeletal: extremities normal- no gross deformities noted and gait normal  Psychiatric: mentation appears normal for baseline and affect normal/bright    LABS  No results found for any visits on 10/25/19.    ASSESSMENT / PLAN:  (E11.65,  Z79.4) Type 2 diabetes mellitus with hyperglycemia, with long-term current use of insulin (H)  Comment: noted--BGs improving!!    10/23: 282, 0, 0, 100%  10/24: 234, 25, 0, 75  10/25: 183, 48, 0, 52    Plan:   Keep using the V-go  Carmita Luke fill v-go for the weekend  Keep using the bolus feature with meals--either 2 to 3 clicks    (Z72.0) Tobacco abuse  Comment: noted  Plan: Tobacco Cessation - Order to Satisfy Health         " Maintenance          Marly's aware to let us know when she's ready to quit smoking.  Discussed the dangers of smoking with diabetes    (Z71.6) Tobacco abuse counseling  Comment: noted  Plan: as noted above    Follow up as scheduled  Sooner if needed    Patient Instructions     HOW TO QUIT SMOKING  Smoking is one of the hardest habits to break. About half of all those who have ever smoked have been able to quit, and most of those (about 70%) who still smoke want to quit. Here are some of the best ways to stop smoking.     KEEP TRYING:  It takes most smokers about 8 tries before they are finally able to fully quit. So, the more often you try and fail, the better your chance of quitting the next time! So, don't give up!    GO COLD TURKEY:  Most ex-smokers quit cold turkey. Trying to cut back gradually doesn't seem to work as well, perhaps because it continues the smoking habit. Also, it is possible to fool yourself by inhaling more while smoking fewer cigarettes. This results in the same amount of nicotine in your body!    GET SUPPORT:  Support programs can make an important difference, especially for the heavy smoker. These groups offer lectures, methods to change your behavior and peer support. Call the free national Quitline for more information. 800-QUIT-NOW (684-394-4181). Low-cost or free programs are offered by many hospitals, local chapters of the American Lung Association (929-550-0248) and the American Cancer Society (201-406-0565). Support at home is important too. Non-smokers can help by offering praise and encouragement. If the smoker fails to quit, encourage them to try again!    OVER-THE-COUNTER MEDICINES:  For those who can't quit on their own, Nicotine Replacement Therapy (NRT) may make quitting much easier. Certain aids such as the nicotine patch, gum and lozenge are available without a prescription. However, it is best to use these under the guidance of your doctor. The skin patch provides a steady  supply of nicotine to the body. Nicotine gum and lozenge gives temporary bursts of low levels of nicotine. Both methods take the edge off the craving for cigarettes. WARNING: If you feel symptoms of nicotine overdose, such as nausea, vomiting, dizziness, weakness, or fast heartbeat, stop using these and see your doctor.    PRESCRIPTION MEDICINES:  After evaluating your smoking patterns and prior attempts at quitting, your doctor may offer a prescription medicine such as bupropion (Zyban, Wellbutrin), varenicline (Chantix, Champix), a niocotine inhaler or nasal spray. Each has its unique advantage and side effects which your doctor can review with you.    HEALTH BENEFITS OF QUITTING:  The benefits of quitting start right away and keep improving the longer you go without smokin minutes: blood pressure and pulse return to normal  8 hours: oxygen levels return to normal  2 days: ability to smell and taste begins to improve as damaged nerves start to regrow  2-3 weeks: circulation and lung function improves  1-9 months: decreased cough, congestion and shortness of breath; less tired  1 year: risk of heart attack decreases by half  5 years: risk of lung cancer decreases by half; risk of stroke becomes the same as a non-smoker  For information about how to quit smoking, visit the following links:  National [employer] [place] ,   Clearing the Air, Quit Smoking Today   - an online booklet. http://www.smokefree.gov/pubs/clearing_the_air.pdf  Smokefree.gov http://smokefree.gov/  QuitNet http://www.quitnet.com/    3365-8410 [facility], [address] [place] [address], [place] [address]. All rights reserved. This information is not intended as a substitute for professional medical care. Always follow your healthcare professional's instructions.    The Benefits of Living Smoke Free  What do you want to gain from quitting? Check off some reasons to quit.  Health Benefits  ___ Reduce my risk of lung cancer, heart disease, chronic  lung disease  ___ Have fewer wrinkles and softer skin  ___ Improve my sense of taste and smell  ___ For pregnant women--reduce the risk of having a miscarriage, stillbirth, premature birth, or low-birth-weight baby  Personal Benefits  ___ Feel more in control of my life  ___ Have better-smelling hair, breath, clothes, home, and car  ___ Save time by not having to take smoke breaks, buy cigarettes, or hunt for a light  ___ Have whiter teeth  Family Benefits  ___ Reduce my children s respiratory tract infections  ___ Set a good example for my children  ___ Reduce my family s cancer risk  Financial Benefits  ___ Save hundreds of dollars each year that would be spent on cigarettes  ___ Save money on medical bills  ___ Save on life, health, and car insurance premiums    Those Dollars Add Up!  Cigarettes are expensive, and getting more expensive all the time. Do you realize how much money you are spending on cigarettes per year? What is the average amount you spend on a pack of cigarettes? What is the average number of packs that you smoke per day? Using your answers to these questions, fill in this formula to help you find out:  ($ _____ per pack) ×  ( _____ number of packs per day) × (365 days) =  $ _____ yearly cost of smoking  Besides tobacco, there are other costs, including extra cleaning bills and replacement costs for clothing and furniture; medical expenses for smoking-related illnesses; and higher health, life, and car insurance premiums.    Cigars and Pipes Count Too!  Cigars and pipes are also dangerous. So are smokeless (chewing) tobacco and snuff. All of these products contain nicotine, a highly addictive substance that has harmful effects on your body. Quitting smoking means giving up all tobacco products.      5469-1203 Elisa Munson, [address] [place] [address], [place] [address]. All rights reserved. This information is not intended as a substitute for professional medical care. Always follow your  healthcare professional's instructions.    Time: 35 minutes  Barrier: see [facility]  Willingness to learn: accepting    Courtney PINTO [facility]  Disease Management    Cc: Amberly Whyte NP    With the electronic record, we can now more quickly and easily track our patient diabetic goals. Our diabetes clinical review is in progress and these are the indicators we are monitoring for good diabetes health.     1.) HbA1C less than 7 (measurement of your average blood sugars)  2.) Blood Pressure less than 140/80  3.) LDL less than 100 (bad cholesterol)  4.) HbA1C is checked in the last 6 months and below 7% (more frequently if not at goal or adjusting medications)  5.) LDL is checked in the last 12 months (more frequently if not at goal or adjusting medications)  6.) Taking one baby aspirin daily (unless otherwise instructed)  7.) No tobacco use  8) Statin use     You have achieved 6 out of 8 of these and I am encouraging you to come in and get tuned up to achieve 8 out of 8!  Here is what you have achieved so far in my goals for you:  1.) HbA1C  less than 7:                              NO  Your last  HbA1C :  Lab Results   Component Value Date    A1C 12.0 08/29/2019    A1C 11.0 05/29/2019    A1C 9.8 02/22/2019    A1C 10.9 11/21/2018    A1C 9.6 08/21/2018    A1C 9.6 08/21/2018     2.) Blood Pressure less than 140/80:       YES      Your last    BP Readings from Last 1 Encounters:   10/29/19 100/75     3.) LDL less than 100:                              YES      Your last     LDL Cholesterol Calculated   Date Value Ref Range Status   03/29/2019 72 <100 mg/dL Final       4.) Checked HbA1C in the past 6 months: YES      5.) Checked LDL in the past 12 months:    YES      6.) Taking one aspirin daily:                       YES     7.) No tobacco use:                                        NO   8.) Statin use      YES

## 2019-11-08 NOTE — PROGRESS NOTES
Clinic Care Coordination Contact  Care Team Conversations    Courtney Chaudhary NP reviewed endocrinology progress note(s) from Marly's visit there on 10/30.  Noted they remarked she is taking glipizide as she had reported this to them.  [facility] does her med setups weekly and confirmed they have NOT been setting up glipizide for pt as this was discontinued earlier this year.  Pt was mistaken when she told them she was taking this.       Acetaminophen (TYLENOL PO) Take 325 mg by mouth every 6 hours as needed     ammonium lactate (LAC-HYDRIN) 12 % cream Apply topically 2 times daily    aspirin 81 MG EC tablet Take 1 tablet (81 mg) by mouth daily    atorvastatin (LIPITOR) 40 MG tablet Take 1 tablet (40 mg) by mouth daily    blood glucose (NO BRAND SPECIFIED) test strip Use to test blood sugar 4 times daily or as directed.    budesonide-formoterol (SYMBICORT) 160-4.5 MCG/ACT Inhaler Inhale 2 puffs into the lungs 2 times daily    Cholecalciferol (VITAMIN D-3) 1000 units CAPS Take 2,000 Units by mouth daily    Cyclobenzaprine HCl (FLEXERIL PO) Take 10 mg by mouth 3 times daily as needed for muscle spasms    hydrocortisone 2.5 % cream Apply topically 2 times daily    insulin pen needle (32G X 4 MM) 32G X 4 MM miscellaneous Use 1 pen needles daily    Ipratropium-Albuterol (COMBIVENT RESPIMAT)  MCG/ACT inhaler Inhale 1 puff into the lungs 4 times daily    lisinopril (PRINIVIL/ZESTRIL) 40 MG tablet Take 1 tablet (40 mg) by mouth daily    nystatin (MYCOSTATIN) 759571 UNIT/GM external powder Apply topically 3 times daily    ONETOUCH DELICA LANCETS 33G MISC 1 each 3 times daily    order for DME Women briefs    primidone (MYSOLINE) 50 MG tablet Take 1 tablet (50 mg) by mouth At Bedtime    Semaglutide,0.25 or 0.5MG/DOS, (OZEMPIC, 0.25 OR 0.5 MG/DOSE,) 2 MG/1.5ML SOPN Inject 0.5 mg Subcutaneous every 7 days    triamcinolone (KENALOG) 0.1 % cream Apply topically 2 times daily    wearable insulin delivery device (Jini  20) kit Insulin delivery device to be changed every 24 hours          Writer phoned [facility] and spoke with a patient care coordinator who is going to get a message to one of their nurses.  Writer advised it is recommended that they do a med reconciliation with the medications listed on our end as they appear to have several more medications listed than what Marly is actually taking.    Referral was originally sent to them with faxed notes including current med list from her current primary care clinic.  Advised writer would be happy to do a reconciliation over the phone if they prefer.  The coordinator will have one of the nurses call back.    Lauren Pipkin, BS-RN   CHF and General Care Coordinator  [phone] Option # 1

## 2019-11-14 ENCOUNTER — TELEPHONE (OUTPATIENT)
Dept: EDUCATION SERVICES | Facility: OTHER | Age: 54
End: 2019-11-14

## 2019-11-14 ENCOUNTER — PATIENT OUTREACH (OUTPATIENT)
Dept: CARE COORDINATION | Facility: OTHER | Age: 54
End: 2019-11-14

## 2019-11-14 ENCOUNTER — ALLIED HEALTH/NURSE VISIT (OUTPATIENT)
Dept: EDUCATION SERVICES | Facility: OTHER | Age: 54
End: 2019-11-14
Attending: NURSE PRACTITIONER
Payer: MEDICARE

## 2019-11-14 VITALS
DIASTOLIC BLOOD PRESSURE: 84 MMHG | OXYGEN SATURATION: 94 % | WEIGHT: 206.2 LBS | HEIGHT: 64 IN | HEART RATE: 82 BPM | SYSTOLIC BLOOD PRESSURE: 136 MMHG | BODY MASS INDEX: 35.2 KG/M2

## 2019-11-14 DIAGNOSIS — Z79.4 TYPE 2 DIABETES MELLITUS WITH HYPERGLYCEMIA, WITH LONG-TERM CURRENT USE OF INSULIN (H): ICD-10-CM

## 2019-11-14 DIAGNOSIS — E11.65 TYPE 2 DIABETES MELLITUS WITH HYPERGLYCEMIA, WITH LONG-TERM CURRENT USE OF INSULIN (H): ICD-10-CM

## 2019-11-14 DIAGNOSIS — Z72.0 TOBACCO ABUSE: ICD-10-CM

## 2019-11-14 DIAGNOSIS — E11.9 TYPE 2 DIABETES, HBA1C GOAL < 7% (H): ICD-10-CM

## 2019-11-14 DIAGNOSIS — Z71.6 TOBACCO ABUSE COUNSELING: ICD-10-CM

## 2019-11-14 PROCEDURE — 99213 OFFICE O/P EST LOW 20 MIN: CPT | Performed by: NURSE PRACTITIONER

## 2019-11-14 PROCEDURE — G0463 HOSPITAL OUTPT CLINIC VISIT: HCPCS

## 2019-11-14 ASSESSMENT — MIFFLIN-ST. JEOR: SCORE: 1506.35

## 2019-11-14 ASSESSMENT — ACTIVITIES OF DAILY LIVING (ADL): DEPENDENT_IADLS:: INDEPENDENT

## 2019-11-14 ASSESSMENT — PAIN SCALES - GENERAL: PAINLEVEL: NO PAIN (0)

## 2019-11-14 NOTE — PATIENT INSTRUCTIONS
Continue working on healthy eating and moving (start low and slow, work up to 30 min, 5x/week)    BG goals:  Fasting and before meals <130, >70  2 hour after eating <180    We only need 1/2 of these numbers to be within target then your A1c will be within target    Medication changes   None, keep using the V-go    Follow up   Monday, November 4th--nurse only      Call me sooner if any problems/concerns and/or questions develop including consistent low BGs <70 or consistent high BGs >200  [phone] (Unit Coordinator)    [phone] (Nurse)      HOW TO QUIT SMOKING  Smoking is one of the hardest habits to break. About half of all those who have ever smoked have been able to quit, and most of those (about 70%) who still smoke want to quit. Here are some of the best ways to stop smoking.     KEEP TRYING:  It takes most smokers about 8 tries before they are finally able to fully quit. So, the more often you try and fail, the better your chance of quitting the next time! So, don't give up!    GO COLD TURKEY:  Most ex-smokers quit cold turkey. Trying to cut back gradually doesn't seem to work as well, perhaps because it continues the smoking habit. Also, it is possible to fool yourself by inhaling more while smoking fewer cigarettes. This results in the same amount of nicotine in your body!    GET SUPPORT:  Support programs can make an important difference, especially for the heavy smoker. These groups offer lectures, methods to change your behavior and peer support. Call the free national Quitline for more information. 800-QUIT-NOW (962-362-5504). Low-cost or free programs are offered by many hospitals, local chapters of the American Lung Association (261-849-4900) and the American Cancer Society (798-397-3253). Support at home is important too. Non-smokers can help by offering praise and encouragement. If the smoker fails to quit, encourage them to try again!    OVER-THE-COUNTER MEDICINES:  For those who can't quit  on their own, Nicotine Replacement Therapy (NRT) may make quitting much easier. Certain aids such as the nicotine patch, gum and lozenge are available without a prescription. However, it is best to use these under the guidance of your doctor. The skin patch provides a steady supply of nicotine to the body. Nicotine gum and lozenge gives temporary bursts of low levels of nicotine. Both methods take the edge off the craving for cigarettes. WARNING: If you feel symptoms of nicotine overdose, such as nausea, vomiting, dizziness, weakness, or fast heartbeat, stop using these and see your doctor.    PRESCRIPTION MEDICINES:  After evaluating your smoking patterns and prior attempts at quitting, your doctor may offer a prescription medicine such as bupropion (Zyban, Wellbutrin), varenicline (Chantix, Champix), a niocotine inhaler or nasal spray. Each has its unique advantage and side effects which your doctor can review with you.    HEALTH BENEFITS OF QUITTING:  The benefits of quitting start right away and keep improving the longer you go without smokin minutes: blood pressure and pulse return to normal  8 hours: oxygen levels return to normal  2 days: ability to smell and taste begins to improve as damaged nerves start to regrow  2-3 weeks: circulation and lung function improves  1-9 months: decreased cough, congestion and shortness of breath; less tired  1 year: risk of heart attack decreases by half  5 years: risk of lung cancer decreases by half; risk of stroke becomes the same as a non-smoker  For information about how to quit smoking, visit the following links:  National Cancer [place] ,   Clearing the Air, Quit Smoking Today   - an online booklet. http://www.smokefree.gov/pubs/clearing_the_air.pdf  Smokefree.gov http://smokefree.gov/  QuitNet http://www.quitnet.com/    3862-5092 Elisa Munson, [address] [place] [address], [place] [address]. All rights reserved. This information is not intended as a substitute  for professional medical care. Always follow your healthcare professional's instructions.    The Benefits of Living Smoke Free  What do you want to gain from quitting? Check off some reasons to quit.  Health Benefits  ___ Reduce my risk of lung cancer, heart disease, chronic lung disease  ___ Have fewer wrinkles and softer skin  ___ Improve my sense of taste and smell  ___ For pregnant women--reduce the risk of having a miscarriage, stillbirth, premature birth, or low-birth-weight baby  Personal Benefits  ___ Feel more in control of my life  ___ Have better-smelling hair, breath, clothes, home, and car  ___ Save time by not having to take smoke breaks, buy cigarettes, or hunt for a light  ___ Have whiter teeth  Family Benefits  ___ Reduce my children s respiratory tract infections  ___ Set a good example for my children  ___ Reduce my family s cancer risk  Financial Benefits  ___ Save hundreds of dollars each year that would be spent on cigarettes  ___ Save money on medical bills  ___ Save on life, health, and car insurance premiums    Those Dollars Add Up!  Cigarettes are expensive, and getting more expensive all the time. Do you realize how much money you are spending on cigarettes per year? What is the average amount you spend on a pack of cigarettes? What is the average number of packs that you smoke per day? Using your answers to these questions, fill in this formula to help you find out:  ($ _____ per pack) ×  ( _____ number of packs per day) × (365 days) =  $ _____ yearly cost of smoking  Besides tobacco, there are other costs, including extra cleaning bills and replacement costs for clothing and furniture; medical expenses for smoking-related illnesses; and higher health, life, and car insurance premiums.    Cigars and Pipes Count Too!  Cigars and pipes are also dangerous. So are smokeless (chewing) tobacco and snuff. All of these products contain nicotine, a highly addictive substance that has harmful  effects on your body. Quitting smoking means giving up all tobacco products.      8997-1079 Elisa Munson, [address] [place] [address], [place] [address]. All rights reserved. This information is not intended as a substitute for professional medical care. Always follow your healthcare professional's instructions.

## 2019-11-14 NOTE — PROGRESS NOTES
"Chief Complaint   Patient presents with     Diabetes       Initial /84   Pulse 82   Ht 1.619 m (5' 3.75\")   Wt 93.5 kg (206 lb 3.2 oz)   SpO2 94%   BMI 35.67 kg/m   Estimated body mass index is 35.67 kg/m  as calculated from the following:    Height as of this encounter: 1.619 m (5' 3.75\").    Weight as of this encounter: 93.5 kg (206 lb 3.2 oz).  Medication Reconciliation: complete  Gege Walter LPN    "

## 2019-11-14 NOTE — TELEPHONE ENCOUNTER
"PA NOT NEEDED - Received PA request from 's for Ozempic pen injectors. Submitted request through [facility] and received immediate response from [employer] stating: \"Available without authorization.\" Pharmacy advised. Documentation scanned to Epic.  "

## 2019-11-14 NOTE — PROGRESS NOTES
Clinic Care Coordination Contact  Care Team Conversations    Received update from provider Courtney Chaudhary NP today following Marly's clinic visit.  She feels pt is going great.  Improving blood sugar average- 177.  Will continue to monitor and support efforts to coordinate care amongst providers as well as home health.    Lauren Pipkin, BS-RN   CHF and General Care Coordinator  [phone] Option # 1

## 2019-11-14 NOTE — PROGRESS NOTES
SUBJECTIVE:  Marly Cannon, 54 year old, female presents with the following Chief Complaint(s) with HPI to follow:  Chief Complaint   Patient presents with     Diabetes        Diabetes Follow-up    Patient is checking blood sugars: 2.1x/day.  Results:  Highest: 270  Lowest: 114  Period average: 177     Values above, >180: 15  Values within goal (): 14  Values below goal, <70: 0        Symptoms of hypoglycemia (low blood sugar): none    Paresthesias (numbness or burning in feet) or sores: No    Diabetic eye exam within the last year: Yes    Breakfast eaten regularly: once in awhile     Patient counting carbs: Yes    Walking: almost every day when it's nice       HPI:  Marly's here today for the follow up regarding her Diabetes mellitus, Type 2.    Lab Results   Component Value Date    A1C 12.0 08/29/2019    A1C 11.0 05/29/2019    A1C 9.8 02/22/2019    A1C 10.9 11/21/2018    A1C 9.6 08/21/2018    A1C 9.6 08/21/2018     Current Diabetes medication:   1. V-go 20--using the bolus feature--4x with meals    2. Ozempic 0.5 mg weekly  Statin use: yes, simvastatin 20 mg daily    Marly's here today for follow up from V-go start.    Denies any issues with the V-go.   Needs another order for them.    Needs an order for Ozempic.   Reports having enough Novolog vials at the house.      Denies any new health concerns.      Patient Active Problem List   Diagnosis     Type 2 diabetes, HbA1c goal < 7% (H)     ACP (advance care planning)     Stage 3 chronic kidney disease (H)     Pulmonary emphysema (H)     Hyperparathyroidism due to renal insufficiency (H)     Morbid obesity (H)     Vitamin D deficiency     Intellectual disability     Breast fibrocystic disorder     Tobacco abuse     Microalbuminuria due to type 2 diabetes mellitus (H)     H/O colonoscopy     [place] cardiac risk <10% in next 10 years     Hypomagnesemia     Tubular adenoma of colon     Hyperlipidemia, unspecified hyperlipidemia type     Essential  hypertension     Callus of foot       History reviewed. No pertinent past medical history.    Past Surgical History:   Procedure Laterality Date     COLONOSCOPY N/A 8/20/2015    Procedure: COLONOSCOPY;  Surgeon: Gentry Porter MD;  Location: [facility]     COLONOSCOPY N/A 9/21/2018    Procedure: COLONOSCOPY;  COLONOSCOPY WITH POLYPECTOMY;  Surgeon: Gentry Porter MD;  Location: [facility]       Family History   Problem Relation Age of Onset     Diabetes Mother      Diabetes Father      Hypertension Brother      Diabetes Sister        Social History     Tobacco Use     Smoking status: Current Every Day Smoker     Packs/day: 1.00     Years: 34.00     Pack years: 34.00     Types: Cigarettes     Start date: 1/1/1979     Smokeless tobacco: Never Used     Tobacco comment: Declined QP 10/2/19   Substance Use Topics     Alcohol use: No     Alcohol/week: 0.0 standard drinks       Current Outpatient Medications   Medication Sig Dispense Refill     Acetaminophen (TYLENOL PO) Take 325 mg by mouth every 6 hours as needed        ammonium lactate (LAC-HYDRIN) 12 % cream Apply topically 2 times daily       aspirin 81 MG EC tablet Take 1 tablet (81 mg) by mouth daily 90 tablet 3     atorvastatin (LIPITOR) 40 MG tablet Take 1 tablet (40 mg) by mouth daily 90 tablet 3     blood glucose (NO BRAND SPECIFIED) test strip Use to test blood sugar 4 times daily or as directed. 300 strip 11     budesonide-formoterol (SYMBICORT) 160-4.5 MCG/ACT Inhaler Inhale 2 puffs into the lungs 2 times daily 1 Inhaler 3     Cholecalciferol (VITAMIN D-3) 1000 units CAPS Take 2,000 Units by mouth daily 90 capsule 11     Cyclobenzaprine HCl (FLEXERIL PO) Take 10 mg by mouth 3 times daily as needed for muscle spasms       hydrocortisone 2.5 % cream Apply topically 2 times daily       insulin aspart (NOVOLOG VIAL) 100 UNITS/ML vial For use in [employer]-GO 20. TDD: 20 units. E11.29.       insulin pen needle (32G X 4 MM) 32G X 4 MM miscellaneous Use 1 pen needles daily  "100 each 3     Ipratropium-Albuterol (COMBIVENT RESPIMAT)  MCG/ACT inhaler Inhale 1 puff into the lungs 4 times daily       lisinopril (PRINIVIL/ZESTRIL) 40 MG tablet Take 1 tablet (40 mg) by mouth daily 30 tablet 3     nystatin (MYCOSTATIN) 915581 UNIT/GM external powder Apply topically 3 times daily 60 g 3     ONETOUCH DELICA LANCETS 33G MISC 1 each 3 times daily 100 each 10     order for DME Women briefs 50 each 1     primidone (MYSOLINE) 50 MG tablet Take 1 tablet (50 mg) by mouth At Bedtime 30 tablet 0     Semaglutide,0.25 or 0.5MG/DOS, (OZEMPIC, 0.25 OR 0.5 MG/DOSE,) 2 MG/1.5ML SOPN Inject 0.5 mg Subcutaneous every 7 days 3 mL 3     triamcinolone (KENALOG) 0.1 % cream Apply topically 2 times daily       wearable insulin delivery device ([place] Depollution 20) kit Insulin delivery device to be changed every 24 hours 30 each 6       No Known Allergies    REVIEW OF SYSTEMS  Skin: negative  Eyes: negative   Ears/Nose/Throat: glasses; negative  Respiratory: No shortness of breath, cough, or hemoptysis;   Cardiovascular: negative  Gastrointestinal: negative  Genitourinary: negative  Musculoskeletal: negative  Neurologic: negative  Psychiatric: negative  Hematologic/Lymphatic/Immunologic: continued  Endocrine: positive for diabetes; sweaty spells--none lately     OBJECTIVE:  /84   Pulse 82   Ht 1.619 m (5' 3.75\")   Wt 93.5 kg (206 lb 3.2 oz)   SpO2 94%   BMI 35.67 kg/m    Constitutional: alert and no distress  Musculoskeletal: extremities normal- no gross deformities noted and gait normal  Psychiatric: mentation appears normal for baseline and affect normal/bright    LABS  No results found for any visits on 11/14/19.    ASSESSMENT / PLAN:  (E11.65,  Z79.4) Type 2 diabetes mellitus with hyperglycemia, with long-term current use of insulin (H)  Comment: BG average is AMAZING  Plan: wearable insulin delivery device (V-GO 20) kit          Keep clicking the button 4x with meals  Recommended 2x with snacks    (E11.9) " Type 2 diabetes, HbA1c goal < 7% (H)  Comment: noted  Plan: Semaglutide,0.25 or 0.5MG/DOS, (OZEMPIC, 0.25         OR 0.5 MG/DOSE,) 2 MG/1.5ML SOPN          Reordered    (Z72.0) Tobacco abuse  Comment: noted and refused  Plan: Tobacco Cessation - Order to Satisfy Health         Maintenance          Marly's aware to let us know when she's ready to quit smoking.  Discussed the dangers of smoking with diabetes    (Z71.6) Tobacco abuse counseling  Comment: noted  Plan: Tobacco Cessation - Order to Satisfy Health         Maintenance          As mentioned above    Follow up in 4-5 weeks  Sooner if needed    Patient Instructions   Continue working on healthy eating and moving (start low and slow, work up to 30 min, 5x/week)    BG goals:  Fasting and before meals <130, >70  2 hour after eating <180    We only need 1/2 of these numbers to be within target then your A1c will be within target    Medication changes   None, keep using the V-go    Follow up   Monday, November 4th--nurse only      Call me sooner if any problems/concerns and/or questions develop including consistent low BGs <70 or consistent high BGs >200  [phone] (Unit Coordinator)    [phone] (Nurse)      HOW TO QUIT SMOKING  Smoking is one of the hardest habits to break. About half of all those who have ever smoked have been able to quit, and most of those (about 70%) who still smoke want to quit. Here are some of the best ways to stop smoking.     KEEP TRYING:  It takes most smokers about 8 tries before they are finally able to fully quit. So, the more often you try and fail, the better your chance of quitting the next time! So, don't give up!    GO COLD TURKEY:  Most ex-smokers quit cold turkey. Trying to cut back gradually doesn't seem to work as well, perhaps because it continues the smoking habit. Also, it is possible to fool yourself by inhaling more while smoking fewer cigarettes. This results in the same amount of nicotine in your body!    GET  SUPPORT:  Support programs can make an important difference, especially for the heavy smoker. These groups offer lectures, methods to change your behavior and peer support. Call the free national Quitline for more information. 800-QUIT-NOW (425-793-0734). Low-cost or free programs are offered by many hospitals, local chapters of the American Lung Association (034-457-7894) and the American Cancer Society (366-487-2679). Support at home is important too. Non-smokers can help by offering praise and encouragement. If the smoker fails to quit, encourage them to try again!    OVER-THE-COUNTER MEDICINES:  For those who can't quit on their own, Nicotine Replacement Therapy (NRT) may make quitting much easier. Certain aids such as the nicotine patch, gum and lozenge are available without a prescription. However, it is best to use these under the guidance of your doctor. The skin patch provides a steady supply of nicotine to the body. Nicotine gum and lozenge gives temporary bursts of low levels of nicotine. Both methods take the edge off the craving for cigarettes. WARNING: If you feel symptoms of nicotine overdose, such as nausea, vomiting, dizziness, weakness, or fast heartbeat, stop using these and see your doctor.    PRESCRIPTION MEDICINES:  After evaluating your smoking patterns and prior attempts at quitting, your doctor may offer a prescription medicine such as bupropion (Zyban, Wellbutrin), varenicline (Chantix, Champix), a niocotine inhaler or nasal spray. Each has its unique advantage and side effects which your doctor can review with you.    HEALTH BENEFITS OF QUITTING:  The benefits of quitting start right away and keep improving the longer you go without smokin minutes: blood pressure and pulse return to normal  8 hours: oxygen levels return to normal  2 days: ability to smell and taste begins to improve as damaged nerves start to regrow  2-3 weeks: circulation and lung function improves  1-9 months:  decreased cough, congestion and shortness of breath; less tired  1 year: risk of heart attack decreases by half  5 years: risk of lung cancer decreases by half; risk of stroke becomes the same as a non-smoker  For information about how to quit smoking, visit the following links:  National [employer] [place] ,   Clearing the Air, Quit Smoking Today   - an online booklet. http://www.smokefree.gov/pubs/clearing_the_air.pdf  Smokefree.gov http://smokefree.gov/  QuitNet http://www.quitnet.com/    6561-2794 Elisa Munson, [address] [place] [address], [place] [address]. All rights reserved. This information is not intended as a substitute for professional medical care. Always follow your healthcare professional's instructions.    The Benefits of Living Smoke Free  What do you want to gain from quitting? Check off some reasons to quit.  Health Benefits  ___ Reduce my risk of lung cancer, heart disease, chronic lung disease  ___ Have fewer wrinkles and softer skin  ___ Improve my sense of taste and smell  ___ For pregnant women--reduce the risk of having a miscarriage, stillbirth, premature birth, or low-birth-weight baby  Personal Benefits  ___ Feel more in control of my life  ___ Have better-smelling hair, breath, clothes, home, and car  ___ Save time by not having to take smoke breaks, buy cigarettes, or hunt for a light  ___ Have whiter teeth  Family Benefits  ___ Reduce my children s respiratory tract infections  ___ Set a good example for my children  ___ Reduce my family s cancer risk  Financial Benefits  ___ Save hundreds of dollars each year that would be spent on cigarettes  ___ Save money on medical bills  ___ Save on life, health, and car insurance premiums    Those Dollars Add Up!  Cigarettes are expensive, and getting more expensive all the time. Do you realize how much money you are spending on cigarettes per year? What is the average amount you spend on a pack of cigarettes? What is the average number of packs  that you smoke per day? Using your answers to these questions, fill in this formula to help you find out:  ($ _____ per pack) ×  ( _____ number of packs per day) × (365 days) =  $ _____ yearly cost of smoking  Besides tobacco, there are other costs, including extra cleaning bills and replacement costs for clothing and furniture; medical expenses for smoking-related illnesses; and higher health, life, and car insurance premiums.    Cigars and Pipes Count Too!  Cigars and pipes are also dangerous. So are smokeless (chewing) tobacco and snuff. All of these products contain nicotine, a highly addictive substance that has harmful effects on your body. Quitting smoking means giving up all tobacco products.      [id number] [facility], [address] [place] [address], [place] [address]. All rights reserved. This information is not intended as a substitute for professional medical care. Always follow your healthcare professional's instructions.    Time: 35 minutes  Barrier: see [facility]  Willingness to learn: accepting    Courtney PINTO [facility]  Disease Management    Cc: Amberly Whyte NP    With the electronic record, we can now more quickly and easily track our patient diabetic goals. Our diabetes clinical review is in progress and these are the indicators we are monitoring for good diabetes health.     1.) HbA1C less than 7 (measurement of your average blood sugars)  2.) Blood Pressure less than 140/80  3.) LDL less than 100 (bad cholesterol)  4.) HbA1C is checked in the last 6 months and below 7% (more frequently if not at goal or adjusting medications)  5.) LDL is checked in the last 12 months (more frequently if not at goal or adjusting medications)  6.) Taking one baby aspirin daily (unless otherwise instructed)  7.) No tobacco use  8) Statin use     You have achieved 6 out of 8 of these and I am encouraging you to come in and get tuned up to achieve 8 out of 8!  Here is what you have achieved so far in my goals for you:  1.)  HbA1C  less than 7:                              NO  Your last  HbA1C :  Lab Results   Component Value Date    A1C 12.0 08/29/2019    A1C 11.0 05/29/2019    A1C 9.8 02/22/2019    A1C 10.9 11/21/2018    A1C 9.6 08/21/2018    A1C 9.6 08/21/2018     2.) Blood Pressure less than 140/80:       YES      Your last    BP Readings from Last 1 Encounters:   11/14/19 136/84     3.) LDL less than 100:                              YES      Your last     LDL Cholesterol Calculated   Date Value Ref Range Status   03/29/2019 72 <100 mg/dL Final       4.) Checked HbA1C in the past 6 months: YES      5.) Checked LDL in the past 12 months:    YES      6.) Taking one aspirin daily:                       YES     7.) No tobacco use:                                        NO   8.) Statin use      YES

## 2019-11-25 ENCOUNTER — TRANSFERRED RECORDS (OUTPATIENT)
Dept: HEALTH INFORMATION MANAGEMENT | Facility: CLINIC | Age: 54
End: 2019-11-25

## 2019-12-02 NOTE — PROGRESS NOTES
Subjective     Marly Cannon is a 54 year old female who presents to clinic today for the following health issues:    HPI   Diabetes Follow-up    How often are you checking your blood sugar? Four or more times daily  Blood sugar testing frequency justification:  pt reports no low blood sugars  What time of day are you checking your blood sugars (select all that apply)?  Before meals and At bedtime  Have you had any blood sugars above 200?  No  Have you had any blood sugars below 70?  No    What symptoms do you notice when your blood sugar is low?  None    What concerns do you have today about your diabetes? None     Do you have any of these symptoms? (Select all that apply)  No numbness or tingling in feet.  No redness, sores or blisters on feet.  No complaints of excessive thirst.  No reports of blurry vision.  No significant changes to weight.     Have you had a diabetic eye exam in the last 12 months? Yes- Date of last eye exam: within the last year pt reports     She currently is on Ozempic. A1C was 9.8 on 10/30/19. [facility] and [facility] are making adjustments.     Diabetes Management Resources    Hyperlipidemia Follow-Up      Are you regularly taking any medication or supplement to lower your cholesterol?   Yes- atorvastatin    Are you having muscle aches or other side effects that you think could be caused by your cholesterol lowering medication?  No     She denies chest pain, shortness of breath, dizziness, syncope, or palpitations.     Hypertension Follow-up      Do you check your blood pressure regularly outside of the clinic? No     Are you following a low salt diet? No    Are your blood pressures ever more than 140 on the top number (systolic) OR more   than 90 on the bottom number (diastolic), for example 140/90? No   As noted above, she denies chest pain, shortness of breath, dizziness, syncope, or palpitations.  She currently is taking lisinopril without side effects.     Patient also  notes that she has a head tremor. Occurring for years, but feels it is worsening. Worse when she is anxious. TSH in 4/2019 was normal. Liver function also normal at this time. She does have some chronic kidney disease, otherwise BMP normal. She has seen neurology in the past and was given mysoline, but she does not feel this is working. No family history of parkinson's. She does drink large amounts of soda.     BP Readings from Last 2 Encounters:   12/04/19 128/74   11/14/19 136/84     Hemoglobin A1C (%)   Date Value   10/30/2019 9.8 (A)   08/29/2019 12.0 (A)     LDL Cholesterol Calculated (mg/dL)   Date Value   03/29/2019 72   02/22/2019 86       BP Readings from Last 2 Encounters:   12/04/19 128/74   11/14/19 136/84     Hemoglobin A1C (%)   Date Value   10/30/2019 9.8 (A)   08/29/2019 12.0 (A)     LDL Cholesterol Calculated (mg/dL)   Date Value   03/29/2019 72   02/22/2019 86       Diabetes Management Resources      How many servings of fruits and vegetables do you eat daily?  2-3    On average, how many sweetened beverages do you drink each day (Examples: soda, juice, sweet tea, etc.  Do NOT count diet or artificially sweetened beverages)?   0    How many days per week do you miss taking your medication? 0      Patient Active Problem List   Diagnosis     Type 2 diabetes, HbA1c goal < 7% (H)     ACP (advance care planning)     Stage 3 chronic kidney disease (H)     Pulmonary emphysema (H)     Hyperparathyroidism due to renal insufficiency (H)     Morbid obesity (H)     Vitamin D deficiency     Intellectual disability     Breast fibrocystic disorder     Tobacco abuse     Microalbuminuria due to type 2 diabetes mellitus (H)     H/O colonoscopy     [place] cardiac risk <10% in next 10 years     Hypomagnesemia     Tubular adenoma of colon     Hyperlipidemia, unspecified hyperlipidemia type     Essential hypertension     Callus of foot     Past Surgical History:   Procedure Laterality Date     COLONOSCOPY N/A  8/20/2015    Procedure: COLONOSCOPY;  Surgeon: Gentry Porter MD;  Location: [facility]     COLONOSCOPY N/A 9/21/2018    Procedure: COLONOSCOPY;  COLONOSCOPY WITH POLYPECTOMY;  Surgeon: Gentry Porter MD;  Location: [facility]       Social History     Tobacco Use     Smoking status: Current Every Day Smoker     Packs/day: 1.00     Years: 34.00     Pack years: 34.00     Types: Cigarettes     Start date: 1/1/1979     Smokeless tobacco: Never Used     Tobacco comment: Declined QP 10/2/19   Substance Use Topics     Alcohol use: No     Alcohol/week: 0.0 standard drinks     Family History   Problem Relation Age of Onset     Diabetes Mother      Diabetes Father      Hypertension Brother      Diabetes Sister          Current Outpatient Medications   Medication Sig Dispense Refill     Acetaminophen (TYLENOL PO) Take 325 mg by mouth every 6 hours as needed        ammonium lactate (LAC-HYDRIN) 12 % cream Apply topically 2 times daily       aspirin 81 MG EC tablet Take 1 tablet (81 mg) by mouth daily 90 tablet 3     atorvastatin (LIPITOR) 40 MG tablet Take 1 tablet (40 mg) by mouth daily 90 tablet 3     blood glucose (NO BRAND SPECIFIED) test strip Use to test blood sugar 4 times daily or as directed. 300 strip 11     budesonide-formoterol (SYMBICORT) 160-4.5 MCG/ACT Inhaler Inhale 2 puffs into the lungs 2 times daily 1 Inhaler 3     Cholecalciferol (VITAMIN D-3) 1000 units CAPS Take 2,000 Units by mouth daily 90 capsule 11     Cyclobenzaprine HCl (FLEXERIL PO) Take 10 mg by mouth 3 times daily as needed for muscle spasms       hydrocortisone 2.5 % cream Apply topically 2 times daily       insulin aspart (NOVOLOG VIAL) 100 UNITS/ML vial For use in [employer] 20. TDD: 20 units. E11.29.       insulin pen needle (32G X 4 MM) 32G X 4 MM miscellaneous Use 1 pen needles daily 100 each 3     Ipratropium-Albuterol (COMBIVENT RESPIMAT)  MCG/ACT inhaler Inhale 1 puff into the lungs 4 times daily       lisinopril (PRINIVIL/ZESTRIL) 40  "MG tablet Take 1 tablet (40 mg) by mouth daily 30 tablet 3     nystatin (MYCOSTATIN) 865102 UNIT/GM external powder Apply topically 3 times daily 60 g 3     ONETOUCH DELICA LANCETS 33G MISC 1 each 3 times daily 100 each 10     order for DME Women briefs 50 each 1     primidone (MYSOLINE) 50 MG tablet Take 1 tablet (50 mg) by mouth At Bedtime 30 tablet 0     Semaglutide,0.25 or 0.5MG/DOS, (OZEMPIC, 0.25 OR 0.5 MG/DOSE,) 2 MG/1.5ML SOPN Inject 0.5 mg Subcutaneous every 7 days 3 mL 3     triamcinolone (KENALOG) 0.1 % cream Apply topically 2 times daily       wearable insulin delivery device (Canary Calendar 20) kit Insulin delivery device to be changed every 24 hours 30 each 6     No Known Allergies    Reviewed and updated as needed this visit by Provider         Review of Systems   As noted in the HPI.      Objective    /74 (BP Location: Left arm, Patient Position: Chair, Cuff Size: Adult Regular)   Pulse 80   Temp 97.5  F (36.4  C) (Tympanic)   Ht 1.615 m (5' 3.6\")   Wt 90.7 kg (200 lb)   SpO2 98%   BMI 34.76 kg/m    Body mass index is 34.76 kg/m .  Physical Exam   GENERAL: obese, alert and no distress  EYES: Eyes grossly normal to inspection, PERRL and conjunctivae and sclerae normal  HENT: ear canals and TM's normal, nose and mouth without ulcers or lesions  NECK: no adenopathy  RESP: lungs clear to auscultation - no rales, rhonchi or wheezes  CV: regular rates and rhythm, no murmur, click or rub and 1+ lower leg edema bilaterally  NEURO: Normal strength and tone, sensory exam grossly normal, mentation intact, oriented times 3, cranial nerves 2-12 intact and DTR's normal and symmetric  PSYCH: mentation appears normal, affect normal/bright  Diabetic foot exam: decreased pulses, but normal sensory exam, and dry cracking feet with calluses    Diagnostic Test Results:  Labs reviewed in Epic  Results for orders placed or performed in visit on 12/04/19 (from the past 24 hour(s))   Basic metabolic panel   Result Value " Ref Range    Sodium 139 133 - 144 mmol/L    Potassium 4.1 3.4 - 5.3 mmol/L    Chloride 105 94 - 109 mmol/L    Carbon Dioxide 29 20 - 32 mmol/L    Anion Gap 5 3 - 14 mmol/L    Glucose 266 (H) 70 - 99 mg/dL    Urea Nitrogen 20 7 - 30 mg/dL    Creatinine 1.18 (H) 0.52 - 1.04 mg/dL    GFR Estimate 51 (L) >60 mL/min/[1.73_m2]    GFR Estimate If Black 60 (L) >60 mL/min/[1.73_m2]    Calcium 9.2 8.5 - 10.1 mg/dL   Albumin Random Urine Quantitative with Creat Ratio   Result Value Ref Range    Creatinine Urine 428 mg/dL    Albumin Urine mg/L 338 mg/L    Albumin Urine mg/g Cr 78.97 (H) 0 - 25 mg/g Cr           Assessment & Plan   (I10) Essential hypertension  (primary encounter diagnosis)  Plan: Well controlled. Continue current medications. Encouraged daily exercise and a low sodium diet. Recommended checking BP's 2x/wk, call the clinic if consistantly s>140 or d>90. Follow up in 2 months.     (E78.5) Hyperlipidemia, unspecified hyperlipidemia type  Plan: On statin. Tolerating. Will continue.     (E11.9) Type 2 diabetes, HbA1c goal < 7% (H)  Comment: too early for A1C  Plan: Albumin Random Urine Quantitative with Creat         Ratio, Basic metabolic panel        Continue Ozempic and recheck A1C in 2 months. On statin. BP at goal. Eye exam within the past year. F/u 2 months.     (E66.01) Morbid obesity (H)  Comment: BMI 34  Plan: Diet and exercise encouraged.     (N18.3) Chronic kidney disease, stage 3 (moderate) (H)  Comment: stable  Plan: Will continue good BP control and try to get better control of her DM. Will also limit sodium in diet to 2 grams or less.     (L84) Callus of foot  Comment: does not bother patient  Plan: Declines to see podiatry.     (R25.1) Tremor  Plan: Unsure cause. Thus far, lab work normal. Calcium levels have been high in the past, but her parathyroid hormone was not elevated. She does take Lipitor, but has stopped this intermittently and the tremor continued. Has seen neurology and mysoline was  "ordered. She notes that she has been taking this, but it is not helping. Will proceed with brain MRI. Will notify patient of the results when available and intervene accordingly. IF persists, will refer back to neurology. She was also encouraged to decrease her caffeine intake.     (E11.29,  R80.9) Microalbuminuria due to type 2 diabetes mellitus (H)  Comment: On ACE  Plan: Continue.          Tobacco Cessation:   reports that she has been smoking cigarettes. She started smoking about 40 years ago. She has a 34.00 pack-year smoking history. She has never used smokeless tobacco.  Tobacco Cessation Action Plan: Information offered: Patient not interested at this time      BMI:   Estimated body mass index is 35.67 kg/m  as calculated from the following:    Height as of 11/14/19: 1.619 m (5' 3.75\").    Weight as of 11/14/19: 93.5 kg (206 lb 3.2 oz).   Weight management plan: Discussed healthy diet and exercise guidelines    Amberly Whyte NP  [facility] [place]      "

## 2019-12-04 ENCOUNTER — OFFICE VISIT (OUTPATIENT)
Dept: FAMILY MEDICINE | Facility: OTHER | Age: 54
End: 2019-12-04
Attending: NURSE PRACTITIONER
Payer: MEDICARE

## 2019-12-04 ENCOUNTER — APPOINTMENT (OUTPATIENT)
Dept: LAB | Facility: OTHER | Age: 54
End: 2019-12-04
Attending: NURSE PRACTITIONER
Payer: MEDICARE

## 2019-12-04 VITALS
WEIGHT: 200 LBS | OXYGEN SATURATION: 98 % | HEIGHT: 64 IN | HEART RATE: 80 BPM | SYSTOLIC BLOOD PRESSURE: 128 MMHG | TEMPERATURE: 97.5 F | BODY MASS INDEX: 34.15 KG/M2 | DIASTOLIC BLOOD PRESSURE: 74 MMHG

## 2019-12-04 DIAGNOSIS — E78.5 HYPERLIPIDEMIA, UNSPECIFIED HYPERLIPIDEMIA TYPE: ICD-10-CM

## 2019-12-04 DIAGNOSIS — E66.01 MORBID OBESITY (H): ICD-10-CM

## 2019-12-04 DIAGNOSIS — L84 CALLUS OF FOOT: ICD-10-CM

## 2019-12-04 DIAGNOSIS — E11.29 MICROALBUMINURIA DUE TO TYPE 2 DIABETES MELLITUS (H): ICD-10-CM

## 2019-12-04 DIAGNOSIS — R25.1 TREMOR: ICD-10-CM

## 2019-12-04 DIAGNOSIS — R80.9 MICROALBUMINURIA DUE TO TYPE 2 DIABETES MELLITUS (H): ICD-10-CM

## 2019-12-04 DIAGNOSIS — N18.30 CHRONIC KIDNEY DISEASE, STAGE 3 (MODERATE): ICD-10-CM

## 2019-12-04 DIAGNOSIS — E11.9 TYPE 2 DIABETES, HBA1C GOAL < 7% (H): ICD-10-CM

## 2019-12-04 DIAGNOSIS — I10 ESSENTIAL HYPERTENSION: Primary | ICD-10-CM

## 2019-12-04 LAB
ALT SERPL W P-5'-P-CCNC: 15 U/L (ref 0–50)
ANION GAP SERPL CALCULATED.3IONS-SCNC: 5 MMOL/L (ref 3–14)
AST SERPL W P-5'-P-CCNC: 10 U/L (ref 0–45)
BUN SERPL-MCNC: 20 MG/DL (ref 7–30)
CALCIUM SERPL-MCNC: 9.2 MG/DL (ref 8.5–10.1)
CHLORIDE SERPL-SCNC: 105 MMOL/L (ref 94–109)
CO2 SERPL-SCNC: 29 MMOL/L (ref 20–32)
CREAT SERPL-MCNC: 1.18 MG/DL (ref 0.52–1.04)
CREAT UR-MCNC: 428 MG/DL
GFR SERPL CREATININE-BSD FRML MDRD: 51 ML/MIN/{1.73_M2}
GLUCOSE SERPL-MCNC: 266 MG/DL (ref 70–99)
MICROALBUMIN UR-MCNC: 338 MG/L
MICROALBUMIN/CREAT UR: 78.97 MG/G CR (ref 0–25)
POTASSIUM SERPL-SCNC: 4.1 MMOL/L (ref 3.4–5.3)
SODIUM SERPL-SCNC: 139 MMOL/L (ref 133–144)
TSH SERPL DL<=0.005 MIU/L-ACNC: 0.97 MU/L (ref 0.4–4)

## 2019-12-04 PROCEDURE — 84450 TRANSFERASE (AST) (SGOT): CPT | Mod: ZL | Performed by: NURSE PRACTITIONER

## 2019-12-04 PROCEDURE — 36415 COLL VENOUS BLD VENIPUNCTURE: CPT | Mod: ZL | Performed by: NURSE PRACTITIONER

## 2019-12-04 PROCEDURE — G0463 HOSPITAL OUTPT CLINIC VISIT: HCPCS

## 2019-12-04 PROCEDURE — 99214 OFFICE O/P EST MOD 30 MIN: CPT | Performed by: NURSE PRACTITIONER

## 2019-12-04 PROCEDURE — 84443 ASSAY THYROID STIM HORMONE: CPT | Mod: ZL | Performed by: NURSE PRACTITIONER

## 2019-12-04 PROCEDURE — 80048 BASIC METABOLIC PNL TOTAL CA: CPT | Mod: ZL | Performed by: NURSE PRACTITIONER

## 2019-12-04 PROCEDURE — 82043 UR ALBUMIN QUANTITATIVE: CPT | Mod: ZL | Performed by: NURSE PRACTITIONER

## 2019-12-04 PROCEDURE — 84460 ALANINE AMINO (ALT) (SGPT): CPT | Mod: ZL | Performed by: NURSE PRACTITIONER

## 2019-12-04 ASSESSMENT — PAIN SCALES - GENERAL: PAINLEVEL: NO PAIN (0)

## 2019-12-04 ASSESSMENT — MIFFLIN-ST. JEOR: SCORE: 1475.84

## 2019-12-04 NOTE — NURSING NOTE
"Chief Complaint   Patient presents with     Diabetes     follow up       Initial /86 (BP Location: Left arm, Patient Position: Chair, Cuff Size: Adult Regular)   Pulse 80   Temp 97.5  F (36.4  C) (Tympanic)   Ht 1.615 m (5' 3.6\")   Wt 90.7 kg (200 lb)   SpO2 98%   BMI 34.76 kg/m   Estimated body mass index is 34.76 kg/m  as calculated from the following:    Height as of this encounter: 1.615 m (5' 3.6\").    Weight as of this encounter: 90.7 kg (200 lb).  Medication Reconciliation: complete  Olga Garcia LPN  "

## 2019-12-05 ENCOUNTER — HOSPITAL ENCOUNTER (OUTPATIENT)
Dept: MRI IMAGING | Facility: HOSPITAL | Age: 54
Discharge: HOME | End: 2019-12-05
Attending: NURSE PRACTITIONER | Admitting: NURSE PRACTITIONER
Payer: MEDICARE

## 2019-12-05 DIAGNOSIS — R25.1 TREMOR: ICD-10-CM

## 2019-12-05 PROCEDURE — A9585 GADOBUTROL INJECTION: HCPCS | Performed by: RADIOLOGY

## 2019-12-05 PROCEDURE — 70553 MRI BRAIN STEM W/O & W/DYE: CPT | Mod: TC

## 2019-12-05 PROCEDURE — 25500064 ZZH RX 255 OP 636: Performed by: RADIOLOGY

## 2019-12-05 RX ORDER — GADOBUTROL 604.72 MG/ML
7.5 INJECTION INTRAVENOUS ONCE
Status: COMPLETED | OUTPATIENT
Start: 2019-12-05 | End: 2019-12-05

## 2019-12-05 RX ORDER — GADOBUTROL 604.72 MG/ML
2 INJECTION INTRAVENOUS ONCE
Status: COMPLETED | OUTPATIENT
Start: 2019-12-05 | End: 2019-12-05

## 2019-12-05 RX ADMIN — GADOBUTROL 7.5 ML: 604.72 INJECTION INTRAVENOUS at 13:06

## 2019-12-05 RX ADMIN — GADOBUTROL 2 ML: 604.72 INJECTION INTRAVENOUS at 13:06

## 2019-12-06 DIAGNOSIS — G93.0 ARACHNOID CYST: Primary | ICD-10-CM

## 2019-12-06 DIAGNOSIS — R25.1 TREMOR: ICD-10-CM

## 2019-12-10 ENCOUNTER — TELEPHONE (OUTPATIENT)
Dept: FAMILY MEDICINE | Facility: OTHER | Age: 54
End: 2019-12-10

## 2019-12-10 NOTE — TELEPHONE ENCOUNTER
Amanda from [facility] is calling to get verbal orders to continue weekly nursing care.  Amanda's phone number - [phone].

## 2019-12-11 RX ORDER — AMLODIPINE BESYLATE 5 MG/1
5 TABLET ORAL DAILY
COMMUNITY
End: 2020-01-03

## 2019-12-11 NOTE — TELEPHONE ENCOUNTER
Spoke with Molly, Amberly gave ok to continue home care services. I verbalized this to Molly, she also reported that patient has started taking Norvasc 5mg 1x daily in which I have added this to her medication list.

## 2019-12-12 ENCOUNTER — TELEPHONE (OUTPATIENT)
Dept: FAMILY MEDICINE | Facility: OTHER | Age: 54
End: 2019-12-12

## 2019-12-12 NOTE — TELEPHONE ENCOUNTER
If you want patient seen for the mass she should be seen by someone at [facility] neuro surgery.  Please call Wendy at the number below to discuss regarding the referral placed on 12/6.    Wendy  Neurology at [facility] [address]  [phone]

## 2019-12-17 DIAGNOSIS — G25.0 ESSENTIAL TREMOR: ICD-10-CM

## 2019-12-17 DIAGNOSIS — I10 ESSENTIAL HYPERTENSION: ICD-10-CM

## 2019-12-18 ENCOUNTER — ALLIED HEALTH/NURSE VISIT (OUTPATIENT)
Dept: EDUCATION SERVICES | Facility: OTHER | Age: 54
End: 2019-12-18
Attending: NURSE PRACTITIONER
Payer: MEDICARE

## 2019-12-18 VITALS
RESPIRATION RATE: 16 BRPM | OXYGEN SATURATION: 90 % | HEART RATE: 80 BPM | HEIGHT: 64 IN | BODY MASS INDEX: 34.5 KG/M2 | WEIGHT: 202.1 LBS | DIASTOLIC BLOOD PRESSURE: 88 MMHG | SYSTOLIC BLOOD PRESSURE: 132 MMHG

## 2019-12-18 DIAGNOSIS — Z72.0 TOBACCO ABUSE: ICD-10-CM

## 2019-12-18 DIAGNOSIS — E11.65 TYPE 2 DIABETES MELLITUS WITH HYPERGLYCEMIA, WITH LONG-TERM CURRENT USE OF INSULIN (H): Primary | ICD-10-CM

## 2019-12-18 DIAGNOSIS — Z79.4 TYPE 2 DIABETES MELLITUS WITH HYPERGLYCEMIA, WITH LONG-TERM CURRENT USE OF INSULIN (H): Primary | ICD-10-CM

## 2019-12-18 DIAGNOSIS — Z71.6 TOBACCO ABUSE COUNSELING: ICD-10-CM

## 2019-12-18 PROCEDURE — G0463 HOSPITAL OUTPT CLINIC VISIT: HCPCS

## 2019-12-18 PROCEDURE — 99213 OFFICE O/P EST LOW 20 MIN: CPT | Performed by: NURSE PRACTITIONER

## 2019-12-18 ASSESSMENT — PAIN SCALES - GENERAL: PAINLEVEL: NO PAIN (0)

## 2019-12-18 ASSESSMENT — MIFFLIN-ST. JEOR: SCORE: 1487.75

## 2019-12-18 NOTE — PROGRESS NOTES
"Chief Complaint   Patient presents with     Diabetes       Initial /88   Pulse 80   Resp 16   Ht 1.619 m (5' 3.75\")   Wt 91.7 kg (202 lb 1.6 oz)   SpO2 90%   BMI 34.96 kg/m   Estimated body mass index is 34.96 kg/m  as calculated from the following:    Height as of this encounter: 1.619 m (5' 3.75\").    Weight as of this encounter: 91.7 kg (202 lb 1.6 oz).  Medication Reconciliation: complete  Gege Walter LPN    "

## 2019-12-18 NOTE — PROGRESS NOTES
SUBJECTIVE:  Marly Cannon, 54 year old, female presents with the following Chief Complaint(s) with HPI to follow:  Chief Complaint   Patient presents with     Diabetes        Diabetes Follow-up    Patient is checking blood sugars: 2.5x/day.  Results:  Highest: 245  Lowest: 107  Period average: 156     Values above, >180: 8  Values within goal (): 27  Values below goal, <70: 0        Symptoms of hypoglycemia (low blood sugar): none    Paresthesias (numbness or burning in feet) or sores: No    Diabetic eye exam within the last year: Yes    Breakfast eaten regularly: once in awhile     Patient counting carbs: Yes    Walking: almost every day when it's nice       HPI:  Marly's here today for the follow up regarding her Diabetes mellitus, Type 2.    Lab Results   Component Value Date    A1C 9.8 10/30/2019    A1C 12.0 08/29/2019    A1C 11.0 05/29/2019    A1C 9.8 02/22/2019    A1C 10.9 11/21/2018     Current Diabetes medication:   1. V-go 20--using the bolus feature--4x with meals    2. Ozempic 0.5 mg weekly (Tuesday)  Statin use: yes, simvastatin 20 mg daily    Marly's here today for follow up regarding her diabetes.    Denies any issues with the V-go.   States she feels better    Denies any new health concerns.      Patient Active Problem List   Diagnosis     Type 2 diabetes, HbA1c goal < 7% (H)     ACP (advance care planning)     Stage 3 chronic kidney disease (H)     Pulmonary emphysema (H)     Hyperparathyroidism due to renal insufficiency (H)     Morbid obesity (H)     Vitamin D deficiency     Intellectual disability     Breast fibrocystic disorder     Tobacco abuse     Microalbuminuria due to type 2 diabetes mellitus (H)     H/O colonoscopy     [place] cardiac risk <10% in next 10 years     Hypomagnesemia     Tubular adenoma of colon     Hyperlipidemia, unspecified hyperlipidemia type     Essential hypertension     Callus of foot       No past medical history on file.    Past Surgical History:    Procedure Laterality Date     COLONOSCOPY N/A 8/20/2015    Procedure: COLONOSCOPY;  Surgeon: Gentry Porter MD;  Location: [facility]     COLONOSCOPY N/A 9/21/2018    Procedure: COLONOSCOPY;  COLONOSCOPY WITH POLYPECTOMY;  Surgeon: Gentry Porter MD;  Location: [facility]       Family History   Problem Relation Age of Onset     Diabetes Mother      Diabetes Father      Hypertension Brother      Diabetes Sister        Social History     Tobacco Use     Smoking status: Current Every Day Smoker     Packs/day: 1.00     Years: 34.00     Pack years: 34.00     Types: Cigarettes     Start date: 1/1/1979     Smokeless tobacco: Never Used     Tobacco comment: Declined QP 10/2/19   Substance Use Topics     Alcohol use: No     Alcohol/week: 0.0 standard drinks       Current Outpatient Medications   Medication Sig Dispense Refill     insulin aspart (NOVOLOG VIAL) 100 UNITS/ML vial For use in [employer] 20. TDD: 56 units. E11.29. 30 mL 3     semaglutide (OZEMPIC, 1 MG/DOSE,) 2 MG/1.5ML pen Inject 1 mg Subcutaneous every 7 days 6 mL 3     Acetaminophen (TYLENOL PO) Take 325 mg by mouth every 6 hours as needed        amLODIPine (NORVASC) 5 MG tablet Take 5 mg by mouth daily       ammonium lactate (LAC-HYDRIN) 12 % cream Apply topically 2 times daily       aspirin 81 MG EC tablet Take 1 tablet (81 mg) by mouth daily 90 tablet 3     atorvastatin (LIPITOR) 40 MG tablet Take 1 tablet (40 mg) by mouth daily 90 tablet 3     blood glucose (NO BRAND SPECIFIED) test strip Use to test blood sugar 4 times daily or as directed. 300 strip 11     budesonide-formoterol (SYMBICORT) 160-4.5 MCG/ACT Inhaler Inhale 2 puffs into the lungs 2 times daily 1 Inhaler 3     Cholecalciferol (VITAMIN D-3) 1000 units CAPS Take 2,000 Units by mouth daily 90 capsule 11     Cyclobenzaprine HCl (FLEXERIL PO) Take 10 mg by mouth 3 times daily as needed for muscle spasms       hydrocortisone 2.5 % cream Apply topically 2 times daily       insulin pen needle (32G X 4  "MM) 32G X 4 MM miscellaneous Use 1 pen needles daily 100 each 3     Ipratropium-Albuterol (COMBIVENT RESPIMAT)  MCG/ACT inhaler Inhale 1 puff into the lungs 4 times daily       lisinopril (PRINIVIL/ZESTRIL) 40 MG tablet TAKE 1 TABLET BY MOUTH DAILY 30 tablet 1     nystatin (MYCOSTATIN) 974058 UNIT/GM external powder Apply topically 3 times daily 60 g 3     ONETOUCH DELICA LANCETS 33G MISC 1 each 3 times daily 100 each 10     order for DME Women briefs 50 each 1     primidone (MYSOLINE) 50 MG tablet TAKE 1 TABLET BY MOUTH AT BEDTIME 30 tablet 1     triamcinolone (KENALOG) 0.1 % cream Apply topically 2 times daily       wearable insulin delivery device ([employer] 20) kit Insulin delivery device to be changed every 24 hours 30 each 6       No Known Allergies    REVIEW OF SYSTEMS  Skin: negative  Eyes: negative   Ears/Nose/Throat: glasses; negative  Respiratory: No shortness of breath, cough, or hemoptysis;   Cardiovascular: negative  Gastrointestinal: negative  Genitourinary: negative--frequency better   Musculoskeletal: negative  Neurologic: left hand fingers > right hand fingers   Psychiatric: negative  Hematologic/Lymphatic/Immunologic: continued  Endocrine: positive for diabetes     OBJECTIVE:  /88   Pulse 80   Resp 16   Ht 1.619 m (5' 3.75\")   Wt 91.7 kg (202 lb 1.6 oz)   SpO2 90%   BMI 34.96 kg/m    Constitutional: alert and no distress  Musculoskeletal: extremities normal- no gross deformities noted and gait normal  Psychiatric: mentation appears normal for baseline and affect normal/bright    LABS  No results found for any visits on 12/18/19.    ASSESSMENT / PLAN:  (E11.65,  Z79.4) Type 2 diabetes mellitus with hyperglycemia, with long-term current use of insulin (H)  (primary encounter diagnosis)  Comment:   BG average is great    Plan: semaglutide (OZEMPIC, 1 MG/DOSE,) 2 MG/1.5ML         pen, insulin aspart (NOVOLOG VIAL) 100 UNITS/ML        vial, DISCONTINUED: insulin aspart (NOVOLOG         " VIAL) 100 UNITS/ML vial          Keep using your V-go as prescribed  Will order Ozempic 1 mg weekly pens    (Z72.0) Tobacco abuse  Comment: noted and refused  Plan: Tobacco Cessation - Order to Satisfy Health         Maintenance          Marly's aware to let us know when she's ready to quit smoking.  Discussed the dangers of smoking with diabetes      (Z71.6) Tobacco abuse counseling  Comment: noted   Plan: Tobacco Cessation - Order to Satisfy Health         Maintenance          As mentioned above    Follow up as discussed  Sooner if needed    Patient Instructions   Continue working on healthy eating and moving (start low and slow, work up to 30 min, 5x/week)    BG goals:  Fasting and before meals <130, >70  2 hour after eating <180    We only need 1/2 of these numbers to be within target then your A1c will be within target    Medication changes   Keep using the V-go    After you are done with the Ozempic 0.5 mg weekly, start using the Ozempic 1 mg weekly    Follow up   February 4th--bring meter      Call me sooner if any problems/concerns and/or questions develop including consistent low BGs <70 or consistent high BGs >200  [phone] (Unit Coordinator)    [phone] (Nurse)        HOW TO QUIT SMOKING  Smoking is one of the hardest habits to break. About half of all those who have ever smoked have been able to quit, and most of those (about 70%) who still smoke want to quit. Here are some of the best ways to stop smoking.     KEEP TRYING:  It takes most smokers about 8 tries before they are finally able to fully quit. So, the more often you try and fail, the better your chance of quitting the next time! So, don't give up!    GO COLD TURKEY:  Most ex-smokers quit cold turkey. Trying to cut back gradually doesn't seem to work as well, perhaps because it continues the smoking habit. Also, it is possible to fool yourself by inhaling more while smoking fewer cigarettes. This results in the same amount of nicotine  in your body!    GET SUPPORT:  Support programs can make an important difference, especially for the heavy smoker. These groups offer lectures, methods to change your behavior and peer support. Call the free national Quitline for more information. 800-QUIT-NOW (132-290-5922). Low-cost or free programs are offered by many hospitals, local chapters of the American Lung Association (299-184-8395) and the American Cancer Society (557-754-4755). Support at home is important too. Non-smokers can help by offering praise and encouragement. If the smoker fails to quit, encourage them to try again!    OVER-THE-COUNTER MEDICINES:  For those who can't quit on their own, Nicotine Replacement Therapy (NRT) may make quitting much easier. Certain aids such as the nicotine patch, gum and lozenge are available without a prescription. However, it is best to use these under the guidance of your doctor. The skin patch provides a steady supply of nicotine to the body. Nicotine gum and lozenge gives temporary bursts of low levels of nicotine. Both methods take the edge off the craving for cigarettes. WARNING: If you feel symptoms of nicotine overdose, such as nausea, vomiting, dizziness, weakness, or fast heartbeat, stop using these and see your doctor.    PRESCRIPTION MEDICINES:  After evaluating your smoking patterns and prior attempts at quitting, your doctor may offer a prescription medicine such as bupropion (Zyban, Wellbutrin), varenicline (Chantix, Champix), a niocotine inhaler or nasal spray. Each has its unique advantage and side effects which your doctor can review with you.    HEALTH BENEFITS OF QUITTING:  The benefits of quitting start right away and keep improving the longer you go without smokin minutes: blood pressure and pulse return to normal  8 hours: oxygen levels return to normal  2 days: ability to smell and taste begins to improve as damaged nerves start to regrow  2-3 weeks: circulation and lung function  improves  1-9 months: decreased cough, congestion and shortness of breath; less tired  1 year: risk of heart attack decreases by half  5 years: risk of lung cancer decreases by half; risk of stroke becomes the same as a non-smoker  For information about how to quit smoking, visit the following links:  National Cancer [place] ,   Clearing the Air, Quit Smoking Today   - an online booklet. http://www.smokefree.gov/pubs/clearing_the_air.pdf  Smokefree.gov http://smokefree.gov/  QuitNet http://www.quitnet.com/    7966-7498 Elisa Munson, [address] [place] [address], [place] [address]. All rights reserved. This information is not intended as a substitute for professional medical care. Always follow your healthcare professional's instructions.    The Benefits of Living Smoke Free  What do you want to gain from quitting? Check off some reasons to quit.  Health Benefits  ___ Reduce my risk of lung cancer, heart disease, chronic lung disease  ___ Have fewer wrinkles and softer skin  ___ Improve my sense of taste and smell  ___ For pregnant women--reduce the risk of having a miscarriage, stillbirth, premature birth, or low-birth-weight baby  Personal Benefits  ___ Feel more in control of my life  ___ Have better-smelling hair, breath, clothes, home, and car  ___ Save time by not having to take smoke breaks, buy cigarettes, or hunt for a light  ___ Have whiter teeth  Family Benefits  ___ Reduce my children s respiratory tract infections  ___ Set a good example for my children  ___ Reduce my family s cancer risk  Financial Benefits  ___ Save hundreds of dollars each year that would be spent on cigarettes  ___ Save money on medical bills  ___ Save on life, health, and car insurance premiums    Those Dollars Add Up!  Cigarettes are expensive, and getting more expensive all the time. Do you realize how much money you are spending on cigarettes per year? What is the average amount you spend on a pack of cigarettes? What is the  average number of packs that you smoke per day? Using your answers to these questions, fill in this formula to help you find out:  ($ _____ per pack) ×  ( _____ number of packs per day) × (365 days) =  $ _____ yearly cost of smoking  Besides tobacco, there are other costs, including extra cleaning bills and replacement costs for clothing and furniture; medical expenses for smoking-related illnesses; and higher health, life, and car insurance premiums.    Cigars and Pipes Count Too!  Cigars and pipes are also dangerous. So are smokeless (chewing) tobacco and snuff. All of these products contain nicotine, a highly addictive substance that has harmful effects on your body. Quitting smoking means giving up all tobacco products.      8526-9591 [facility], [address] [place] [address], [place] [address]. All rights reserved. This information is not intended as a substitute for professional medical care. Always follow your healthcare professional's instructions.    Time: 35 minutes  Barrier: see [facility]  Willingness to learn: accepting    Courtney PINTO [facility]  Disease Management    Cc: Amberly Whyte NP    With the electronic record, we can now more quickly and easily track our patient diabetic goals. Our diabetes clinical review is in progress and these are the indicators we are monitoring for good diabetes health.     1.) HbA1C less than 7 (measurement of your average blood sugars)  2.) Blood Pressure less than 140/80  3.) LDL less than 100 (bad cholesterol)  4.) HbA1C is checked in the last 6 months and below 7% (more frequently if not at goal or adjusting medications)  5.) LDL is checked in the last 12 months (more frequently if not at goal or adjusting medications)  6.) Taking one baby aspirin daily (unless otherwise instructed)  7.) No tobacco use  8) Statin use     You have achieved 6 out of 8 of these and I am encouraging you to come in and get tuned up to achieve 8 out of 8!  Here is what you have achieved so far  in my goals for you:  1.) HbA1C  less than 7:                              NO  Your last  HbA1C :  Lab Results   Component Value Date    A1C 9.8 10/30/2019    A1C 12.0 08/29/2019    A1C 11.0 05/29/2019    A1C 9.8 02/22/2019    A1C 10.9 11/21/2018     2.) Blood Pressure less than 140/80:       YES      Your last    BP Readings from Last 1 Encounters:   12/18/19 132/88     3.) LDL less than 100:                              YES      Your last     LDL Cholesterol Calculated   Date Value Ref Range Status   03/29/2019 72 <100 mg/dL Final       4.) Checked HbA1C in the past 6 months: YES      5.) Checked LDL in the past 12 months:    YES      6.) Taking one aspirin daily:                       YES     7.) No tobacco use:                                        NO   8.) Statin use      YES

## 2019-12-18 NOTE — PATIENT INSTRUCTIONS
Continue working on healthy eating and moving (start low and slow, work up to 30 min, 5x/week)    BG goals:  Fasting and before meals <130, >70  2 hour after eating <180    We only need 1/2 of these numbers to be within target then your A1c will be within target    Medication changes   Keep using the V-go    After you are done with the Ozempic 0.5 mg weekly, start using the Ozempic 1 mg weekly    Follow up   February 4th--bring meter      Call me sooner if any problems/concerns and/or questions develop including consistent low BGs <70 or consistent high BGs >200  [phone] (Unit Coordinator)    [phone] (Nurse)        HOW TO QUIT SMOKING  Smoking is one of the hardest habits to break. About half of all those who have ever smoked have been able to quit, and most of those (about 70%) who still smoke want to quit. Here are some of the best ways to stop smoking.     KEEP TRYING:  It takes most smokers about 8 tries before they are finally able to fully quit. So, the more often you try and fail, the better your chance of quitting the next time! So, don't give up!    GO COLD TURKEY:  Most ex-smokers quit cold turkey. Trying to cut back gradually doesn't seem to work as well, perhaps because it continues the smoking habit. Also, it is possible to fool yourself by inhaling more while smoking fewer cigarettes. This results in the same amount of nicotine in your body!    GET SUPPORT:  Support programs can make an important difference, especially for the heavy smoker. These groups offer lectures, methods to change your behavior and peer support. Call the free national Quitline for more information. 800-QUIT-NOW (829-636-5647). Low-cost or free programs are offered by many hospitals, local chapters of the American Lung Association (699-146-0749) and the American Cancer Society (110-826-6291). Support at home is important too. Non-smokers can help by offering praise and encouragement. If the smoker fails to quit,  encourage them to try again!    OVER-THE-COUNTER MEDICINES:  For those who can't quit on their own, Nicotine Replacement Therapy (NRT) may make quitting much easier. Certain aids such as the nicotine patch, gum and lozenge are available without a prescription. However, it is best to use these under the guidance of your doctor. The skin patch provides a steady supply of nicotine to the body. Nicotine gum and lozenge gives temporary bursts of low levels of nicotine. Both methods take the edge off the craving for cigarettes. WARNING: If you feel symptoms of nicotine overdose, such as nausea, vomiting, dizziness, weakness, or fast heartbeat, stop using these and see your doctor.    PRESCRIPTION MEDICINES:  After evaluating your smoking patterns and prior attempts at quitting, your doctor may offer a prescription medicine such as bupropion (Zyban, Wellbutrin), varenicline (Chantix, Champix), a niocotine inhaler or nasal spray. Each has its unique advantage and side effects which your doctor can review with you.    HEALTH BENEFITS OF QUITTING:  The benefits of quitting start right away and keep improving the longer you go without smokin minutes: blood pressure and pulse return to normal  8 hours: oxygen levels return to normal  2 days: ability to smell and taste begins to improve as damaged nerves start to regrow  2-3 weeks: circulation and lung function improves  1-9 months: decreased cough, congestion and shortness of breath; less tired  1 year: risk of heart attack decreases by half  5 years: risk of lung cancer decreases by half; risk of stroke becomes the same as a non-smoker  For information about how to quit smoking, visit the following links:  National Cancer [place] ,   Clearing the Air, Quit Smoking Today   - an online booklet. http://www.smokefree.gov/pubs/clearing_the_air.pdf  Smokefree.gov http://smokefree.gov/  QuitNet http://www.quitnet.com/    6170-3510 Elisa Munson, [address] [facility],  [place] [address]. All rights reserved. This information is not intended as a substitute for professional medical care. Always follow your healthcare professional's instructions.    The Benefits of Living Smoke Free  What do you want to gain from quitting? Check off some reasons to quit.  Health Benefits  ___ Reduce my risk of lung cancer, heart disease, chronic lung disease  ___ Have fewer wrinkles and softer skin  ___ Improve my sense of taste and smell  ___ For pregnant women--reduce the risk of having a miscarriage, stillbirth, premature birth, or low-birth-weight baby  Personal Benefits  ___ Feel more in control of my life  ___ Have better-smelling hair, breath, clothes, home, and car  ___ Save time by not having to take smoke breaks, buy cigarettes, or hunt for a light  ___ Have whiter teeth  Family Benefits  ___ Reduce my children s respiratory tract infections  ___ Set a good example for my children  ___ Reduce my family s cancer risk  Financial Benefits  ___ Save hundreds of dollars each year that would be spent on cigarettes  ___ Save money on medical bills  ___ Save on life, health, and car insurance premiums    Those Dollars Add Up!  Cigarettes are expensive, and getting more expensive all the time. Do you realize how much money you are spending on cigarettes per year? What is the average amount you spend on a pack of cigarettes? What is the average number of packs that you smoke per day? Using your answers to these questions, fill in this formula to help you find out:  ($ _____ per pack) ×  ( _____ number of packs per day) × (365 days) =  $ _____ yearly cost of smoking  Besides tobacco, there are other costs, including extra cleaning bills and replacement costs for clothing and furniture; medical expenses for smoking-related illnesses; and higher health, life, and car insurance premiums.    Cigars and Pipes Count Too!  Cigars and pipes are also dangerous. So are smokeless (chewing) tobacco and snuff.  All of these products contain nicotine, a highly addictive substance that has harmful effects on your body. Quitting smoking means giving up all tobacco products.      9430-4375 [facility], [address] [place] [address], [place] [address]. All rights reserved. This information is not intended as a substitute for professional medical care. Always follow your healthcare professional's instructions.

## 2019-12-20 RX ORDER — PRIMIDONE 50 MG/1
TABLET ORAL
Qty: 30 TABLET | Refills: 1 | Status: SHIPPED | OUTPATIENT
Start: 2019-12-20 | End: 2020-02-14

## 2019-12-20 RX ORDER — LISINOPRIL 40 MG/1
TABLET ORAL
Qty: 30 TABLET | Refills: 1 | Status: SHIPPED | OUTPATIENT
Start: 2019-12-20 | End: 2020-02-14

## 2019-12-20 NOTE — TELEPHONE ENCOUNTER
primidone      Last Written Prescription Date:  10/22/19  Last Fill Quantity: 30,   # refills: 0  Last Office Visit: 12/4/19  Future Office visit:    Next 5 appointments (look out 90 days)    Feb 04, 2020  1:45 PM CST  (Arrive by 1:30 PM)  [facility]urse [facility] with [facility]  [facility] [address] [address]  [phone]   Feb 04, 2020  2:10 PM CST  (Arrive by 1:55 PM)  Office Visit with Amberly Whyte NP  [facility] ([facility] [address] [address]  [phone]           Routing refill request to provider for review/approval because:  Drug not on the [facility], P or [facility] refill protocol or controlled substance

## 2019-12-23 ENCOUNTER — TELEPHONE (OUTPATIENT)
Dept: EDUCATION SERVICES | Facility: HOSPITAL | Age: 54
End: 2019-12-23

## 2019-12-23 NOTE — TELEPHONE ENCOUNTER
Nikolai from [facility] calls the pt is currently taking Ozempic 0.25mg and they received a new order from you to increase to 1 mg. Chart states she was taking 0.5mg at her last visit and her Rx at home states that too, but pt has only been taking 0.25mg. Please advise him what should be done. Pt has already taken dose for the week. It is ok to wait until the provider is in again on 12/26/19.

## 2019-12-31 NOTE — TELEPHONE ENCOUNTER
Clinic Care Coordination Contact  Care Team Conversations    CC has also reviewed upcoming appts at [facility] and noted she has still not scheduled with nephrology.  Last CC had heard was that our HUC Rebecca had been in touch with their office and they were checking to see if pt was due to see Dr Eason.  CC understanding was that she was overdue to be seen.  No upcoming appts scheduled to this date.    Will see if Jean Claude would be willing to sit with pt at next home visit and make a call to their office.    Lauren Pipkin, BS-RN   Care Coordination  Primary Care- [facility]  [phone] Option # 1

## 2019-12-31 NOTE — TELEPHONE ENCOUNTER
Clinic Care Coordination Contact  Care Team Conversations    CC reviewed chart today and noted this telephone message.      Further chart review determined the following:    The Ozempic 1mg weekly was just ordered on 12/18/19.  Courtney did order the 0.5mg dose on 11/14/19.  CC was not present at this visit and received update from provider after the visit, thus cannot speak to discussion that occurred between pt and provider during visit.      The Ozempic is not on the pt's med list at [facility] (Care Everywhere).  They were waiting on confirmation on dosing before putting this on her med list (according to 12/10 telephone note in their system).    Writer notes the following statement from this note also:  'Patients home care nurse steph stating she is wondering why she is seeing our office as well as the [place] [facility].  I did see that the Referral came to us from the [facility] (Courtney Chaudhary)  Rosenda BANSAL CNP here had told Marly a few months back that she should not be seeing both Providers for her Diabetes.  If she plan on continuing here in our office she should not be seeing the Provider in [place].'    Will find out what Courtney Chaudhary, GRISEL would suggest at this point.    1) Should pt be on the 1mg weekly dose of Ozempic?  Writer can call home care RN Nikolai to update him.    2) Should pt stop seeing the DRC here in [place] if she is going to [facility] per the [facility] Everywhere note?  It is unclear what pt's preference would be if she had to make this choice.      Lauren Pipkin, BS-RN   Care Coordination  Primary Care- [facility]  [phone] Option # 1

## 2020-01-07 ENCOUNTER — TELEPHONE (OUTPATIENT)
Dept: EDUCATION SERVICES | Facility: HOSPITAL | Age: 55
End: 2020-01-07

## 2020-01-07 DIAGNOSIS — E11.65 TYPE 2 DIABETES MELLITUS WITH HYPERGLYCEMIA, WITH LONG-TERM CURRENT USE OF INSULIN (H): Primary | ICD-10-CM

## 2020-01-07 DIAGNOSIS — Z79.4 TYPE 2 DIABETES MELLITUS WITH HYPERGLYCEMIA, WITH LONG-TERM CURRENT USE OF INSULIN (H): Primary | ICD-10-CM

## 2020-01-07 NOTE — TELEPHONE ENCOUNTER
Сергей from [facility] called she has questions about the amount of insulin to be used in the Vgo, the pharmacy is telling her it is too much. Please call.

## 2020-01-08 NOTE — TELEPHONE ENCOUNTER
Clinic Care Coordination Contact  Care Team Conversations    CC reviewed chart and Care Everywhere.  Noted pt will be seeing PCP next on 2/4/20.  She is scheduled with the diabetes nurse here.  No upcoming appts with Courtney Chaudhary NP.  Amberly Whyte NP did inform writer that she would prefer if pt continued to see Courtney but, as noted below, pt was told by [facility] endocrinology that she had to pick one or the other.  Pt is scheduled on 1/15/20 to see them again.  Given the known difficulties we have had in helping Marly manage her diabetes writer does believe it to be beneficial at this time to pick one or the other.  There have been several calls and questions from home care about dosing of medications.  The communication between systems and home care is a barrier to accurate care and has the potential to increase confusion and non-compliance in the pt who is known to have cognitive impairment.    Will continue to monitor for now.    Lauren Pipkin, BS-RN   Care Coordination  Primary Care- [facility]  [phone] Option # 1

## 2020-01-15 ENCOUNTER — TRANSFERRED RECORDS (OUTPATIENT)
Dept: HEALTH INFORMATION MANAGEMENT | Facility: HOSPITAL | Age: 55
End: 2020-01-15

## 2020-01-15 ENCOUNTER — TELEPHONE (OUTPATIENT)
Dept: EDUCATION SERVICES | Facility: OTHER | Age: 55
End: 2020-01-15

## 2020-01-15 ENCOUNTER — TRANSFERRED RECORDS (OUTPATIENT)
Dept: HEALTH INFORMATION MANAGEMENT | Facility: CLINIC | Age: 55
End: 2020-01-15

## 2020-01-15 LAB — HBA1C MFR BLD: 7.9 % (ref 4–5.6)

## 2020-01-15 NOTE — TELEPHONE ENCOUNTER
"Pt was seen at [facility] Endocrinology today, Luh Chirinos does not want to make any changes to the pt's medications, because her meds are being Rx'd at the [facility] she does not feel comfortable changing her meds. The pt is also not the best historian and does not come with any one else. She does not want to have \"too many hands in the pot.\" They will not be scheduling a Follow-up appt, if you would like to have them continue to see her and have her take over management please let them know. Pt has a nurse only appt with us in 02/20.  "

## 2020-01-16 NOTE — PROGRESS NOTES
"Called [facility] to see where the initial concerns of Marly and the amount of insulin being used in her VGO as the pharmacy is telling her \"it is too much\".     I explained that the VGO 20--20 units + 36 units = 56 units  And that it's vacuum based when filling so she can't \"over fill\" the Vgo    Pharmacy reports that she got the follow vials:  1/2: 1 box    1/7: 2 boxes    [facility] thinking she might have misplaced a box or lost a box.    Will let Marly's care coordinator, Josie KABA, RN, know about this too.     Courtney PINTO [facility]-BC  Diabetes and Wound Care              "

## 2020-01-16 NOTE — TELEPHONE ENCOUNTER
Called Home care.     The VGO 20 allows for 20 units of basal and 36 units of on demand bolus.     Thanks    Courtney PINTO [facility]-BC  Diabetes and Wound Care

## 2020-01-17 NOTE — TELEPHONE ENCOUNTER
Clinic Care Coordination Contact  Care Team Conversations    CC phoned Marly today.  Scheduled her to see Courtney Chaudhary NP at 1PM on 2/4/20 prior to her appointment with Amberly Whyte NP at 2:10PM.      Marly is in agreement that she will discontinue seeing [facility] at this time.  Will only see Courtney Chaudhary NP for diabetes management due to she lives in [place].      Marly does report to  today that she received glipizide from the pharmacy.  She has not been taking this.  She was advised NOT to start taking it.  Should discuss with Courtney Chaudhary NP.  We discontinued this earlier in the year and pt has not been taking.  This was communicated to [facility] Endocrinology on 11/8/9 when writer called them.  Marly verbalized understanding and will not take this.    Lauren Pipkin, BS-RN   Care Coordination  Primary Care- [facility]  [phone] Option # 1

## 2020-01-22 ENCOUNTER — TELEPHONE (OUTPATIENT)
Dept: EDUCATION SERVICES | Facility: HOSPITAL | Age: 55
End: 2020-01-22

## 2020-01-22 NOTE — TELEPHONE ENCOUNTER
Clinic Care Coordination Contact  Care Team Conversations    LM requesting Laura call back to discuss the glipizide.    Lauren Pipkin, BS-RN   Care Coordination  Primary Care- [facility]  [phone] Option # 1

## 2020-01-22 NOTE — TELEPHONE ENCOUNTER
Laura from [facility] calls the pt got an Rx for Glipizide 10 mg bid from Rosenda Chirinos on 01/16/20 and they are wondering if she should take this? It is not on our med list. Please advise.

## 2020-01-22 NOTE — TELEPHONE ENCOUNTER
Clinic Care Coordination Contact  Care Team Conversations    Writer spoke with Laura and informed her that Marly should not be taking the glipizide and will be returning to see Courtney Chaudhary NP only for her diabetes management.    Lauren Pipkin, BS-RN   Care Coordination  Primary Care- [facility]  [phone] Option # 1

## 2020-01-27 NOTE — PROGRESS NOTES
Marly is here for a glucose sensor insertion for a CGM study. Sensor agreement signed.    Sensor attached to right upper arm. No redness, drainage or bleeding noted. Patient instructed on testing schedule, how to complete the patient log, restrictions during wear and to remove if having an x-ray, MRI or CT.    Patient return date requested from Burt Chaudhary.    Freestyle Bud Sensor:  Lot#:[id number]  Expiration Date: 07/31/19  Sensor S/N: [id number]    Patient's identity was verified by using patient's name and date of birth prior to insertion.    Sensor started and 2 minute re-check completed.    Written instructions and patient log given to patient.  Gege Walter     noine

## 2020-02-03 NOTE — PROGRESS NOTES
Subjective     Marly Cannon is a 54 year old female who presents to clinic today for the following health issues:    HPI   Diabetes Follow-up      How often are you checking your blood sugar? 3 times a day, notes that they have been around 120     What concerns do you have today about your diabetes? None     Do you have any of these symptoms? (Select all that apply)  No numbness or tingling in feet.  No redness, sores or blisters on feet.  No complaints of excessive thirst.  No reports of blurry vision.  No significant changes to weight.     She currently is taking Ozempic. A1C was 7.9 on 1/15/20.     Eye exam scheduled for next month.   -She denies chest pain, shortness of breath, dizziness, palpitations, or syncope.              Hyperlipidemia Follow-Up      Are you regularly taking any medication or supplement to lower your cholesterol?   Yes- atorvastatin    Are you having muscle aches or other side effects that you think could be caused by your cholesterol lowering medication?  No     She denies chest pain, shortness of breath, dizziness, palpitations, or syncope.     Hypertension Follow-up      Do you check your blood pressure regularly outside of the clinic? No     Are you following a low salt diet? Yes    Are your blood pressures ever more than 140 on the top number (systolic) OR more   than 90 on the bottom number (diastolic), for example 140/90?  Not checking    -She denies chest pain, shortness of breath, dizziness, syncope, or palpitations.  -She currently is taking lisinopril and amlodipine without side effects.    -She continues to have a head tremor. Has seen neurology in the past and was given mysoline. When she was last seen on 12/4/19, she did not feel the mysoline was working. She was referred for a brain MRI which showed:      Impression:  Focal areas of encephalomalacia involving the cerebellar hemispheres,  worse on the left.       Few nonspecific white matter changes suggesting the  possibility of  small vessel disease. No evidence of acute or subacute ischemia.     Asymmetry of the cerebellopontine angles suggesting the presence of a  12 mm x 15 mm arachnoid cyst abutting the ventral lateral margin of  the left cerebellar hemisphere and possibly impinging upon the left  7th and 8th cranial nerves.    She was referred to neurology, but never made an appointment. She notes that her tremor has improved and she does not feel she needs to see neurology. Also in the process of completing neuropsych testing. She denies vision changes. No hearing problems. No facial numbness or tingling. No slurred speech.        BP Readings from Last 2 Encounters:   02/04/20 118/70   02/04/20 114/75     Hemoglobin A1C (%)   Date Value   02/04/2020 7.5 (H)   01/15/2020 7.9 (A)     LDL Cholesterol Calculated (mg/dL)   Date Value   02/04/2020 49   03/29/2019 72         How many servings of fruits and vegetables do you eat daily?  0-1    On average, how many sweetened beverages do you drink each day (Examples: soda, juice, sweet tea, etc.  Do NOT count diet or artificially sweetened beverages)?   1    How many days per week do you exercise enough to make your heart beat faster? None     How many minutes a day do you exercise enough to make your heart beat faster? 9 or less    How many days per week do you miss taking your medication? 0      Patient Active Problem List   Diagnosis     Type 2 diabetes, HbA1c goal < 7% (H)     ACP (advance care planning)     Stage 3 chronic kidney disease (H)     Pulmonary emphysema (H)     Hyperparathyroidism due to renal insufficiency (H)     Morbid obesity (H)     Vitamin D deficiency     Intellectual disability     Breast fibrocystic disorder     Tobacco abuse     Microalbuminuria due to type 2 diabetes mellitus (H)     H/O colonoscopy     [place] cardiac risk <10% in next 10 years     Hypomagnesemia     Tubular adenoma of colon     Hyperlipidemia, unspecified hyperlipidemia type      Essential hypertension     Callus of foot     Past Surgical History:   Procedure Laterality Date     COLONOSCOPY N/A 8/20/2015    Procedure: COLONOSCOPY;  Surgeon: Gentry Porter MD;  Location: [facility] OR     COLONOSCOPY N/A 9/21/2018    Procedure: COLONOSCOPY;  COLONOSCOPY WITH POLYPECTOMY;  Surgeon: Gentry Porter MD;  Location: [facility] OR       Social History     Tobacco Use     Smoking status: Current Every Day Smoker     Packs/day: 1.00     Years: 34.00     Pack years: 34.00     Types: Cigarettes     Start date: 1/1/1979     Smokeless tobacco: Never Used     Tobacco comment: Declined QP 10/2/19   Substance Use Topics     Alcohol use: No     Alcohol/week: 0.0 standard drinks     Family History   Problem Relation Age of Onset     Diabetes Mother      Diabetes Father      Hypertension Brother      Diabetes Sister          Current Outpatient Medications   Medication Sig Dispense Refill     Acetaminophen (TYLENOL PO) Take 325 mg by mouth every 6 hours as needed        amLODIPine (NORVASC) 5 MG tablet TAKE 1 TABLET BY MOUTH DAILY 90 tablet 1     ammonium lactate (LAC-HYDRIN) 12 % cream Apply topically 2 times daily       aspirin 81 MG EC tablet Take 1 tablet (81 mg) by mouth daily 90 tablet 3     atorvastatin (LIPITOR) 40 MG tablet Take 1 tablet (40 mg) by mouth daily 90 tablet 3     blood glucose (NO BRAND SPECIFIED) test strip Use to test blood sugar 4 times daily or as directed. 300 strip 11     budesonide-formoterol (SYMBICORT) 160-4.5 MCG/ACT Inhaler Inhale 2 puffs into the lungs 2 times daily 1 Inhaler 3     Cholecalciferol (VITAMIN D-3) 1000 units CAPS Take 2,000 Units by mouth daily 90 capsule 11     Cyclobenzaprine HCl (FLEXERIL PO) Take 10 mg by mouth 3 times daily as needed for muscle spasms       hydrocortisone 2.5 % cream Apply topically 2 times daily       insulin aspart (NOVOLOG VIAL) 100 UNITS/ML vial For use in [employer] 20. TDD: 56 units. E11.29. 30 mL 3     insulin pen needle (32G X 4 MM) 32G X 4 MM  "miscellaneous Use 1 pen needles daily 100 each 3     Ipratropium-Albuterol (COMBIVENT RESPIMAT)  MCG/ACT inhaler Inhale 1 puff into the lungs 4 times daily       lisinopril (PRINIVIL/ZESTRIL) 40 MG tablet TAKE 1 TABLET BY MOUTH DAILY 30 tablet 1     nystatin (MYCOSTATIN) 909240 UNIT/GM external powder Apply topically 3 times daily 60 g 3     ONETOUCH DELICA LANCETS 33G MISC 1 each 3 times daily 100 each 10     order for DME Women briefs 50 each 1     primidone (MYSOLINE) 50 MG tablet TAKE 1 TABLET BY MOUTH AT BEDTIME 30 tablet 1     semaglutide (OZEMPIC, 1 MG/DOSE,) 2 MG/1.5ML pen Inject 1 mg Subcutaneous every 7 days 6 mL 3     triamcinolone (KENALOG) 0.1 % cream Apply topically 2 times daily       wearable insulin delivery device ([employer] 20) kit Insulin delivery device to be changed every 24 hours 30 each 6     No Known Allergies    Reviewed and updated as needed this visit by Provider         Review of Systems   As noted in the HPI.       Objective    /70 (BP Location: Left arm, Patient Position: Chair, Cuff Size: Adult Large)   Pulse 89   Temp 97.6  F (36.4  C) (Tympanic)   Ht 1.619 m (5' 3.75\")   Wt 89.8 kg (198 lb)   SpO2 92%   BMI 34.25 kg/m    Body mass index is 34.25 kg/m .  Physical Exam   GENERAL: alert, no distress and appears older than stated age  EYES: Eyes grossly normal to inspection, PERRL and conjunctivae and sclerae normal  HENT: ear canals and TM's normal, nose and mouth without ulcers or lesions  NECK: no adenopathy   RESP: lungs clear to auscultation - no rales, rhonchi or wheezes  CV: regular rates and rhythm, no murmur, click or rub and trace edema bilateral lower legs  NEURO: Normal strength and tone, mentation intact and speech normal  PSYCH: mentation appears normal, affect normal/bright  Diabetic foot exam: Skin intact. No ulcers, but she does have a dry erythematous rash-appears like a dermatitis. Calluses also noted on both feet. Pulses present, but diminished. Normal " sensation.     Diagnostic Test Results:  Labs reviewed in Epic  Results for orders placed or performed in visit on 02/04/20 (from the past 24 hour(s))   Lipid Profile   Result Value Ref Range    Cholesterol 119 <200 mg/dL    Triglycerides 141 <150 mg/dL    HDL Cholesterol 42 (L) >49 mg/dL    LDL Cholesterol Calculated 49 <100 mg/dL    Non HDL Cholesterol 77 <130 mg/dL   Basic metabolic panel   Result Value Ref Range    Sodium 138 133 - 144 mmol/L    Potassium 4.1 3.4 - 5.3 mmol/L    Chloride 105 94 - 109 mmol/L    Carbon Dioxide 26 20 - 32 mmol/L    Anion Gap 7 3 - 14 mmol/L    Glucose 175 (H) 70 - 99 mg/dL    Urea Nitrogen 19 7 - 30 mg/dL    Creatinine 1.23 (H) 0.52 - 1.04 mg/dL    GFR Estimate 49 (L) >60 mL/min/[1.73_m2]    GFR Estimate If Black 57 (L) >60 mL/min/[1.73_m2]    Calcium 9.6 8.5 - 10.1 mg/dL   Hemoglobin A1c   Result Value Ref Range    Hemoglobin A1C Test canceled by physician 0 - 5.6 %   Estimated Average Glucose   Result Value Ref Range    Estimated Average Glucose Test canceled by physician mg/dL           Assessment & Plan   (I10) Essential hypertension  (primary encounter diagnosis)  Plan: Well controlled. Continue current medications. Encouraged daily exercise and a low sodium diet. Recommended checking BP's 2x/wk, call the clinic if consistantly s>140 or d>90. Follow up in 3 months.     (E11.9) Type 2 diabetes, HbA1c goal < 7% (H)  Plan: Recent A1C 7.9. Much improvement from the past. Continue to follow with the diabetic center. On statin. BP at goal. Will schedule eye exam. F/u 3 months.     (E78.5) Hyperlipidemia, unspecified hyperlipidemia type  Plan: Tolerating statin. Will continue.     (N18.3) Chronic kidney disease, stage 3 (moderate) (H)  Comment: stable  Plan: Continue to limit her sodium intact and avoid NSAIDs.     (R25.1) Tremor  Comment: no longer bothers patient, declines to see neurology  Plan: Continue primidone    (G93.0) Arachnoid cyst  Comment: recent MRI with cyst abutting  the ventral lateral margin of  the left cerebellar hemisphere and possibly impinging upon the left  7th and 8th cranial nerves.  Plan: NEUROSURGERY REFERRAL        Will refer to neurosurgery.     (E11.29,  R80.9) Microalbuminuria due to type 2 diabetes mellitus (H)  Plan: On ACE. Continue.     (L30.9) Dermatitis  Comment: skin on foot appears like a dry skin dermatitis  Plan: She was encouraged to try using hydrocortisone cream. Declines referral to podiatry.         Return in about 3 months (around 5/4/2020).    Amberly Whyte NP  [facility] - [facility]

## 2020-02-04 ENCOUNTER — ALLIED HEALTH/NURSE VISIT (OUTPATIENT)
Dept: EDUCATION SERVICES | Facility: OTHER | Age: 55
End: 2020-02-04
Attending: NURSE PRACTITIONER
Payer: MEDICARE

## 2020-02-04 ENCOUNTER — OFFICE VISIT (OUTPATIENT)
Dept: FAMILY MEDICINE | Facility: OTHER | Age: 55
End: 2020-02-04
Attending: NURSE PRACTITIONER
Payer: MEDICARE

## 2020-02-04 ENCOUNTER — PATIENT OUTREACH (OUTPATIENT)
Dept: CARE COORDINATION | Facility: OTHER | Age: 55
End: 2020-02-04

## 2020-02-04 VITALS
DIASTOLIC BLOOD PRESSURE: 75 MMHG | HEART RATE: 89 BPM | OXYGEN SATURATION: 93 % | SYSTOLIC BLOOD PRESSURE: 114 MMHG | WEIGHT: 196.6 LBS | RESPIRATION RATE: 16 BRPM | BODY MASS INDEX: 33.57 KG/M2 | HEIGHT: 64 IN

## 2020-02-04 VITALS
DIASTOLIC BLOOD PRESSURE: 70 MMHG | SYSTOLIC BLOOD PRESSURE: 118 MMHG | OXYGEN SATURATION: 92 % | HEART RATE: 89 BPM | HEIGHT: 64 IN | BODY MASS INDEX: 33.8 KG/M2 | WEIGHT: 198 LBS | TEMPERATURE: 97.6 F

## 2020-02-04 DIAGNOSIS — N18.30 CHRONIC KIDNEY DISEASE, STAGE 3 (MODERATE): ICD-10-CM

## 2020-02-04 DIAGNOSIS — E11.65 TYPE 2 DIABETES MELLITUS WITH HYPERGLYCEMIA, WITH LONG-TERM CURRENT USE OF INSULIN (H): Primary | ICD-10-CM

## 2020-02-04 DIAGNOSIS — E11.9 TYPE 2 DIABETES, HBA1C GOAL < 7% (H): ICD-10-CM

## 2020-02-04 DIAGNOSIS — R25.1 TREMOR: ICD-10-CM

## 2020-02-04 DIAGNOSIS — E78.5 HYPERLIPIDEMIA, UNSPECIFIED HYPERLIPIDEMIA TYPE: ICD-10-CM

## 2020-02-04 DIAGNOSIS — I10 ESSENTIAL HYPERTENSION: Primary | ICD-10-CM

## 2020-02-04 DIAGNOSIS — Z71.6 TOBACCO ABUSE COUNSELING: ICD-10-CM

## 2020-02-04 DIAGNOSIS — Z79.4 TYPE 2 DIABETES MELLITUS WITH HYPERGLYCEMIA, WITH LONG-TERM CURRENT USE OF INSULIN (H): Primary | ICD-10-CM

## 2020-02-04 DIAGNOSIS — I10 ESSENTIAL HYPERTENSION: ICD-10-CM

## 2020-02-04 DIAGNOSIS — Z72.0 TOBACCO ABUSE: ICD-10-CM

## 2020-02-04 DIAGNOSIS — G93.0 ARACHNOID CYST: ICD-10-CM

## 2020-02-04 DIAGNOSIS — R80.9 MICROALBUMINURIA DUE TO TYPE 2 DIABETES MELLITUS (H): ICD-10-CM

## 2020-02-04 DIAGNOSIS — E11.29 MICROALBUMINURIA DUE TO TYPE 2 DIABETES MELLITUS (H): ICD-10-CM

## 2020-02-04 DIAGNOSIS — L30.9 DERMATITIS: ICD-10-CM

## 2020-02-04 LAB
ANION GAP SERPL CALCULATED.3IONS-SCNC: 7 MMOL/L (ref 3–14)
BUN SERPL-MCNC: 19 MG/DL (ref 7–30)
CALCIUM SERPL-MCNC: 9.6 MG/DL (ref 8.5–10.1)
CHLORIDE SERPL-SCNC: 105 MMOL/L (ref 94–109)
CHOLEST SERPL-MCNC: 119 MG/DL
CO2 SERPL-SCNC: 26 MMOL/L (ref 20–32)
CREAT SERPL-MCNC: 1.23 MG/DL (ref 0.52–1.04)
EST. AVERAGE GLUCOSE BLD GHB EST-MCNC: NORMAL MG/DL
GFR SERPL CREATININE-BSD FRML MDRD: 49 ML/MIN/{1.73_M2}
GLUCOSE SERPL-MCNC: 175 MG/DL (ref 70–99)
HBA1C MFR BLD: NORMAL % (ref 0–5.6)
HDLC SERPL-MCNC: 42 MG/DL
LDLC SERPL CALC-MCNC: 49 MG/DL
NONHDLC SERPL-MCNC: 77 MG/DL
POTASSIUM SERPL-SCNC: 4.1 MMOL/L (ref 3.4–5.3)
SODIUM SERPL-SCNC: 138 MMOL/L (ref 133–144)
TRIGL SERPL-MCNC: 141 MG/DL

## 2020-02-04 PROCEDURE — 99213 OFFICE O/P EST LOW 20 MIN: CPT | Performed by: NURSE PRACTITIONER

## 2020-02-04 PROCEDURE — 99214 OFFICE O/P EST MOD 30 MIN: CPT | Performed by: NURSE PRACTITIONER

## 2020-02-04 PROCEDURE — 80061 LIPID PANEL: CPT | Mod: ZL | Performed by: NURSE PRACTITIONER

## 2020-02-04 PROCEDURE — G0463 HOSPITAL OUTPT CLINIC VISIT: HCPCS | Mod: 27

## 2020-02-04 PROCEDURE — 36415 COLL VENOUS BLD VENIPUNCTURE: CPT | Mod: ZL | Performed by: NURSE PRACTITIONER

## 2020-02-04 PROCEDURE — 80048 BASIC METABOLIC PNL TOTAL CA: CPT | Mod: ZL | Performed by: NURSE PRACTITIONER

## 2020-02-04 PROCEDURE — G0463 HOSPITAL OUTPT CLINIC VISIT: HCPCS

## 2020-02-04 ASSESSMENT — PAIN SCALES - GENERAL
PAINLEVEL: NO PAIN (0)
PAINLEVEL: NO PAIN (0)

## 2020-02-04 ASSESSMENT — ACTIVITIES OF DAILY LIVING (ADL): DEPENDENT_IADLS:: INDEPENDENT

## 2020-02-04 ASSESSMENT — MIFFLIN-ST. JEOR
SCORE: 1462.8
SCORE: 1469.15

## 2020-02-04 NOTE — NURSING NOTE
"Chief Complaint   Patient presents with     Diabetes     Hypertension     Hyperlipidemia       Initial /70 (BP Location: Left arm, Patient Position: Chair, Cuff Size: Adult Large)   Pulse 89   Temp 97.6  F (36.4  C) (Tympanic)   Ht 1.619 m (5' 3.75\")   Wt 89.8 kg (198 lb)   SpO2 92%   BMI 34.25 kg/m   Estimated body mass index is 34.25 kg/m  as calculated from the following:    Height as of this encounter: 1.619 m (5' 3.75\").    Weight as of this encounter: 89.8 kg (198 lb).  Medication Reconciliation: complete  Karolina Camacho LPN  "

## 2020-02-04 NOTE — PATIENT INSTRUCTIONS
Continue working on healthy eating and moving (start low and slow, work up to 30 min, 5x/week)    BG goals:  Fasting and before meals <130, >70  2 hour after eating <180    We only need 1/2 of these numbers to be within target then your A1c will be within target    Medication changes   Keep using the V-go    Keep using Ozempic 1mg weekly    Follow up   6 weeks      Call me sooner if any problems/concerns and/or questions develop including consistent low BGs <70 or consistent high BGs >200  [phone] (Unit Coordinator)    [phone] (Nurse)    HOW TO QUIT SMOKING  Smoking is one of the hardest habits to break. About half of all those who have ever smoked have been able to quit, and most of those (about 70%) who still smoke want to quit. Here are some of the best ways to stop smoking.     KEEP TRYING:  It takes most smokers about 8 tries before they are finally able to fully quit. So, the more often you try and fail, the better your chance of quitting the next time! So, don't give up!    GO COLD TURKEY:  Most ex-smokers quit cold turkey. Trying to cut back gradually doesn't seem to work as well, perhaps because it continues the smoking habit. Also, it is possible to fool yourself by inhaling more while smoking fewer cigarettes. This results in the same amount of nicotine in your body!    GET SUPPORT:  Support programs can make an important difference, especially for the heavy smoker. These groups offer lectures, methods to change your behavior and peer support. Call the free national Quitline for more information. 800-QUIT-NOW (486-332-9575). Low-cost or free programs are offered by many hospitals, local chapters of the American Lung Association (547-500-1575) and the American Cancer Society (707-804-4940). Support at home is important too. Non-smokers can help by offering praise and encouragement. If the smoker fails to quit, encourage them to try again!    OVER-THE-COUNTER MEDICINES:  For those who can't quit  on their own, Nicotine Replacement Therapy (NRT) may make quitting much easier. Certain aids such as the nicotine patch, gum and lozenge are available without a prescription. However, it is best to use these under the guidance of your doctor. The skin patch provides a steady supply of nicotine to the body. Nicotine gum and lozenge gives temporary bursts of low levels of nicotine. Both methods take the edge off the craving for cigarettes. WARNING: If you feel symptoms of nicotine overdose, such as nausea, vomiting, dizziness, weakness, or fast heartbeat, stop using these and see your doctor.    PRESCRIPTION MEDICINES:  After evaluating your smoking patterns and prior attempts at quitting, your doctor may offer a prescription medicine such as bupropion (Zyban, Wellbutrin), varenicline (Chantix, Champix), a niocotine inhaler or nasal spray. Each has its unique advantage and side effects which your doctor can review with you.    HEALTH BENEFITS OF QUITTING:  The benefits of quitting start right away and keep improving the longer you go without smokin minutes: blood pressure and pulse return to normal  8 hours: oxygen levels return to normal  2 days: ability to smell and taste begins to improve as damaged nerves start to regrow  2-3 weeks: circulation and lung function improves  1-9 months: decreased cough, congestion and shortness of breath; less tired  1 year: risk of heart attack decreases by half  5 years: risk of lung cancer decreases by half; risk of stroke becomes the same as a non-smoker  For information about how to quit smoking, visit the following links:  National Cancer [place] ,   Clearing the Air, Quit Smoking Today   - an online booklet. http://www.smokefree.gov/pubs/clearing_the_air.pdf  Smokefree.gov http://smokefree.gov/  QuitNet http://www.quitnet.com/    4079-4239 Elisa Munson, [address] [place] [address], [place] [address]. All rights reserved. This information is not intended as a substitute  for professional medical care. Always follow your healthcare professional's instructions.    The Benefits of Living Smoke Free  What do you want to gain from quitting? Check off some reasons to quit.  Health Benefits  ___ Reduce my risk of lung cancer, heart disease, chronic lung disease  ___ Have fewer wrinkles and softer skin  ___ Improve my sense of taste and smell  ___ For pregnant women--reduce the risk of having a miscarriage, stillbirth, premature birth, or low-birth-weight baby  Personal Benefits  ___ Feel more in control of my life  ___ Have better-smelling hair, breath, clothes, home, and car  ___ Save time by not having to take smoke breaks, buy cigarettes, or hunt for a light  ___ Have whiter teeth  Family Benefits  ___ Reduce my children s respiratory tract infections  ___ Set a good example for my children  ___ Reduce my family s cancer risk  Financial Benefits  ___ Save hundreds of dollars each year that would be spent on cigarettes  ___ Save money on medical bills  ___ Save on life, health, and car insurance premiums    Those Dollars Add Up!  Cigarettes are expensive, and getting more expensive all the time. Do you realize how much money you are spending on cigarettes per year? What is the average amount you spend on a pack of cigarettes? What is the average number of packs that you smoke per day? Using your answers to these questions, fill in this formula to help you find out:  ($ _____ per pack) ×  ( _____ number of packs per day) × (365 days) =  $ _____ yearly cost of smoking  Besides tobacco, there are other costs, including extra cleaning bills and replacement costs for clothing and furniture; medical expenses for smoking-related illnesses; and higher health, life, and car insurance premiums.    Cigars and Pipes Count Too!  Cigars and pipes are also dangerous. So are smokeless (chewing) tobacco and snuff. All of these products contain nicotine, a highly addictive substance that has harmful  effects on your body. Quitting smoking means giving up all tobacco products.      2710-4367 Elisa Munson, [address] [place] [address], [place] [address]. All rights reserved. This information is not intended as a substitute for professional medical care. Always follow your healthcare professional's instructions.

## 2020-02-04 NOTE — PROGRESS NOTES
Clinic Care Coordination Contact  Care Team Conversations    Care coordinator attended office visit with pt and Courtney Chaudhary NP this date.  Please refer to provider's progress note for updates to patient's plan of care.    *Phoned [facility] as pt still has not seen Dr Eason for follow-up.  They confirmed that she does not need a new referral.  They stated if pt wishes to see this provider in [place] their notes say she will have to contact our scheduling dept here at [facility].  We were previously having one of our HUCs look into this but there was no resolution because there were no schedules available for this provider.  CC was informed today that Dr Eason/Dr Aguila are coming in March.  Amberly Whyte's nurse will have pt get scheduled before she leaves today.    *Amberly Whyte also placed neurosurgery referral today.  Will assure she is scheduled with [facility] for this.    Encouraged pt to call care coordinator with any questions/concerns/problems.  Verbalized understanding.    Lauren Pipkin, BS-RN   CHF and General Care Coordinator  [phone] Option # 1

## 2020-02-04 NOTE — PROGRESS NOTES
SUBJECTIVE:  Marly Cannon, 54 year old, female presents with the following Chief Complaint(s) with HPI to follow:  Chief Complaint   Patient presents with     Diabetes        Diabetes Follow-up    Patient is checking blood sugars: 2.4x/day.  Results:  Highest: 229  Lowest: 92  Period average: 141 (was 156)     Values above, >180: 5  Values within goal (): 29  Values below goal, <70: 0      Symptoms of hypoglycemia (low blood sugar): none    Paresthesias (numbness or burning in feet) or sores: No    Diabetic eye exam within the last year: Yes    Breakfast eaten regularly: yes    Patient counting carbs: Yes    Exercising: not much with weather        HPI:  Marly's here today for the follow up regarding her Diabetes mellitus, Type 2.    Lab Results   Component Value Date    A1C 7.5 02/04/2020    A1C 7.9 01/15/2020    A1C 9.8 10/30/2019    A1C 12.0 08/29/2019    A1C 11.0 05/29/2019     Current Diabetes medication:   1. V-go 20--using the bolus feature--4x with meals    2. Ozempic 1 mg weekly (Tuesday)  Statin use: yes, simvastatin 20 mg daily  ASA: yes, 81 mg daily    Marly's here today for follow up regarding her diabetes.    Denies any issues with the V-go.   Down 17 lb  States she's doing great    Denies any new health concerns.      Patient Active Problem List   Diagnosis     Type 2 diabetes, HbA1c goal < 7% (H)     ACP (advance care planning)     Stage 3 chronic kidney disease (H)     Pulmonary emphysema (H)     Hyperparathyroidism due to renal insufficiency (H)     Morbid obesity (H)     Vitamin D deficiency     Intellectual disability     Breast fibrocystic disorder     Tobacco abuse     Microalbuminuria due to type 2 diabetes mellitus (H)     H/O colonoscopy     [place] cardiac risk <10% in next 10 years     Hypomagnesemia     Tubular adenoma of colon     Hyperlipidemia, unspecified hyperlipidemia type     Essential hypertension     Callus of foot       History reviewed. No pertinent past medical  history.    Past Surgical History:   Procedure Laterality Date     COLONOSCOPY N/A 8/20/2015    Procedure: COLONOSCOPY;  Surgeon: Gentry Porter MD;  Location: [facility]     COLONOSCOPY N/A 9/21/2018    Procedure: COLONOSCOPY;  COLONOSCOPY WITH POLYPECTOMY;  Surgeon: Gentry Porter MD;  Location: [facility]       Family History   Problem Relation Age of Onset     Diabetes Mother      Diabetes Father      Hypertension Brother      Diabetes Sister        Social History     Tobacco Use     Smoking status: Current Every Day Smoker     Packs/day: 1.00     Years: 34.00     Pack years: 34.00     Types: Cigarettes     Start date: 1/1/1979     Smokeless tobacco: Never Used     Tobacco comment: Declined QP 10/2/19   Substance Use Topics     Alcohol use: No     Alcohol/week: 0.0 standard drinks       Current Outpatient Medications   Medication Sig Dispense Refill     Acetaminophen (TYLENOL PO) Take 325 mg by mouth every 6 hours as needed        amLODIPine (NORVASC) 5 MG tablet TAKE 1 TABLET BY MOUTH DAILY 90 tablet 1     ammonium lactate (LAC-HYDRIN) 12 % cream Apply topically 2 times daily       aspirin 81 MG EC tablet Take 1 tablet (81 mg) by mouth daily 90 tablet 3     atorvastatin (LIPITOR) 40 MG tablet Take 1 tablet (40 mg) by mouth daily 90 tablet 3     blood glucose (NO BRAND SPECIFIED) test strip Use to test blood sugar 4 times daily or as directed. 300 strip 11     budesonide-formoterol (SYMBICORT) 160-4.5 MCG/ACT Inhaler Inhale 2 puffs into the lungs 2 times daily 1 Inhaler 3     Cholecalciferol (VITAMIN D-3) 1000 units CAPS Take 2,000 Units by mouth daily 90 capsule 11     Cyclobenzaprine HCl (FLEXERIL PO) Take 10 mg by mouth 3 times daily as needed for muscle spasms       hydrocortisone 2.5 % cream Apply topically 2 times daily       insulin aspart (NOVOLOG VIAL) 100 UNITS/ML vial For use in PingboardGO 20. TDD: 56 units. E11.29. 30 mL 3     insulin pen needle (32G X 4 MM) 32G X 4 MM miscellaneous Use 1 pen needles  "daily 100 each 3     Ipratropium-Albuterol (COMBIVENT RESPIMAT)  MCG/ACT inhaler Inhale 1 puff into the lungs 4 times daily       lisinopril (PRINIVIL/ZESTRIL) 40 MG tablet TAKE 1 TABLET BY MOUTH DAILY 30 tablet 1     nystatin (MYCOSTATIN) 193987 UNIT/GM external powder Apply topically 3 times daily 60 g 3     ONETOUCH DELICA LANCETS 33G MISC 1 each 3 times daily 100 each 10     order for DME Women briefs 50 each 1     primidone (MYSOLINE) 50 MG tablet TAKE 1 TABLET BY MOUTH AT BEDTIME 30 tablet 1     semaglutide (OZEMPIC, 1 MG/DOSE,) 2 MG/1.5ML pen Inject 1 mg Subcutaneous every 7 days 6 mL 3     triamcinolone (KENALOG) 0.1 % cream Apply topically 2 times daily       wearable insulin delivery device (Avisena 20) kit Insulin delivery device to be changed every 24 hours 30 each 6       No Known Allergies    REVIEW OF SYSTEMS  Skin: negative  Eyes: negative   Ears/Nose/Throat: glasses; negative  Respiratory: No shortness of breath, cough, or hemoptysis;   Cardiovascular: negative  Gastrointestinal: negative  Genitourinary: negative  Musculoskeletal: negative  Neurologic: left hand fingers > right hand fingers--better   Psychiatric: negative  Hematologic/Lymphatic/Immunologic: continued  Endocrine: positive for diabetes     OBJECTIVE:  /75   Pulse 89   Resp 16   Ht 1.619 m (5' 3.75\")   Wt 89.2 kg (196 lb 9.6 oz)   SpO2 93%   BMI 34.01 kg/m    Constitutional: alert and no distress  Musculoskeletal: extremities normal- no gross deformities noted and gait normal  Psychiatric: mentation appears normal for baseline and affect normal/bright    LABS  Results for orders placed or performed in visit on 02/04/20   Lipid Profile     Status: Abnormal   Result Value Ref Range    Cholesterol 119 <200 mg/dL    Triglycerides 141 <150 mg/dL    HDL Cholesterol 42 (L) >49 mg/dL    LDL Cholesterol Calculated 49 <100 mg/dL    Non HDL Cholesterol 77 <130 mg/dL   Basic metabolic panel     Status: Abnormal   Result Value Ref " Range    Sodium 138 133 - 144 mmol/L    Potassium 4.1 3.4 - 5.3 mmol/L    Chloride 105 94 - 109 mmol/L    Carbon Dioxide 26 20 - 32 mmol/L    Anion Gap 7 3 - 14 mmol/L    Glucose 175 (H) 70 - 99 mg/dL    Urea Nitrogen 19 7 - 30 mg/dL    Creatinine 1.23 (H) 0.52 - 1.04 mg/dL    GFR Estimate 49 (L) >60 mL/min/[1.73_m2]    GFR Estimate If Black 57 (L) >60 mL/min/[1.73_m2]    Calcium 9.6 8.5 - 10.1 mg/dL   Hemoglobin A1c     Status: None   Result Value Ref Range    Hemoglobin A1C Test canceled by physician 0 - 5.6 %   Estimated Average Glucose     Status: None   Result Value Ref Range    Estimated Average Glucose Test canceled by physician mg/dL       ASSESSMENT / PLAN:  (E11.65,  Z79.4) Type 2 diabetes mellitus with hyperglycemia, with long-term current use of insulin (H)  (primary encounter diagnosis)  Comment: noted A1c (which was cancelled)--7.5%  Plan:   Keep up the hard work  No change in medication    (Z72.0) Tobacco abuse  Comment: noted and refused  Plan: Tobacco Cessation - Order to Satisfy Health         Maintenance                Marly's aware to let us know when she's ready to quit smoking.  Discussed the dangers of smoking with diabetes    (Z71.6) Tobacco abuse counseling  Comment: noted   Plan: Tobacco Cessation - Order to Satisfy Health         Maintenance          As mentioned above    Follow up as discussed  Sooner if needed    Patient Instructions   Continue working on healthy eating and moving (start low and slow, work up to 30 min, 5x/week)    BG goals:  Fasting and before meals <130, >70  2 hour after eating <180    We only need 1/2 of these numbers to be within target then your A1c will be within target    Medication changes   Keep using the V-go    Keep using Ozempic 1mg weekly    Follow up   6 weeks      Call me sooner if any problems/concerns and/or questions develop including consistent low BGs <70 or consistent high BGs >200  [phone] (Unit Coordinator)    [phone] (Nurse)    HOW TO  QUIT SMOKING  Smoking is one of the hardest habits to break. About half of all those who have ever smoked have been able to quit, and most of those (about 70%) who still smoke want to quit. Here are some of the best ways to stop smoking.     KEEP TRYING:  It takes most smokers about 8 tries before they are finally able to fully quit. So, the more often you try and fail, the better your chance of quitting the next time! So, don't give up!    GO COLD TURKEY:  Most ex-smokers quit cold turkey. Trying to cut back gradually doesn't seem to work as well, perhaps because it continues the smoking habit. Also, it is possible to fool yourself by inhaling more while smoking fewer cigarettes. This results in the same amount of nicotine in your body!    GET SUPPORT:  Support programs can make an important difference, especially for the heavy smoker. These groups offer lectures, methods to change your behavior and peer support. Call the free national Quitline for more information. 800-QUIT-NOW (149-748-2618). Low-cost or free programs are offered by many hospitals, local chapters of the American Lung Association (338-384-8834) and the American Cancer Society (031-135-2346). Support at home is important too. Non-smokers can help by offering praise and encouragement. If the smoker fails to quit, encourage them to try again!    OVER-THE-COUNTER MEDICINES:  For those who can't quit on their own, Nicotine Replacement Therapy (NRT) may make quitting much easier. Certain aids such as the nicotine patch, gum and lozenge are available without a prescription. However, it is best to use these under the guidance of your doctor. The skin patch provides a steady supply of nicotine to the body. Nicotine gum and lozenge gives temporary bursts of low levels of nicotine. Both methods take the edge off the craving for cigarettes. WARNING: If you feel symptoms of nicotine overdose, such as nausea, vomiting, dizziness, weakness, or fast heartbeat,  stop using these and see your doctor.    PRESCRIPTION MEDICINES:  After evaluating your smoking patterns and prior attempts at quitting, your doctor may offer a prescription medicine such as bupropion (Zyban, Wellbutrin), varenicline (Chantix, Champix), a niocotine inhaler or nasal spray. Each has its unique advantage and side effects which your doctor can review with you.    HEALTH BENEFITS OF QUITTING:  The benefits of quitting start right away and keep improving the longer you go without smokin minutes: blood pressure and pulse return to normal  8 hours: oxygen levels return to normal  2 days: ability to smell and taste begins to improve as damaged nerves start to regrow  2-3 weeks: circulation and lung function improves  1-9 months: decreased cough, congestion and shortness of breath; less tired  1 year: risk of heart attack decreases by half  5 years: risk of lung cancer decreases by half; risk of stroke becomes the same as a non-smoker  For information about how to quit smoking, visit the following links:  National Cancer [place] ,   Clearing the Air, Quit Smoking Today   - an online booklet. http://www.smokefree.gov/pubs/clearing_the_air.pdf  Smokefree.gov http://smokefree.gov/  QuitNet http://www.quitnet.com/    1072-0275 Elisa Munson, [address] [place] [address], [place] [address]. All rights reserved. This information is not intended as a substitute for professional medical care. Always follow your healthcare professional's instructions.    The Benefits of Living Smoke Free  What do you want to gain from quitting? Check off some reasons to quit.  Health Benefits  ___ Reduce my risk of lung cancer, heart disease, chronic lung disease  ___ Have fewer wrinkles and softer skin  ___ Improve my sense of taste and smell  ___ For pregnant women--reduce the risk of having a miscarriage, stillbirth, premature birth, or low-birth-weight baby  Personal Benefits  ___ Feel more in control of my life  ___ Have  better-smelling hair, breath, clothes, home, and car  ___ Save time by not having to take smoke breaks, buy cigarettes, or hunt for a light  ___ Have whiter teeth  Family Benefits  ___ Reduce my children s respiratory tract infections  ___ Set a good example for my children  ___ Reduce my family s cancer risk  Financial Benefits  ___ Save hundreds of dollars each year that would be spent on cigarettes  ___ Save money on medical bills  ___ Save on life, health, and car insurance premiums    Those Dollars Add Up!  Cigarettes are expensive, and getting more expensive all the time. Do you realize how much money you are spending on cigarettes per year? What is the average amount you spend on a pack of cigarettes? What is the average number of packs that you smoke per day? Using your answers to these questions, fill in this formula to help you find out:  ($ _____ per pack) ×  ( _____ number of packs per day) × (365 days) =  $ _____ yearly cost of smoking  Besides tobacco, there are other costs, including extra cleaning bills and replacement costs for clothing and furniture; medical expenses for smoking-related illnesses; and higher health, life, and car insurance premiums.    Cigars and Pipes Count Too!  Cigars and pipes are also dangerous. So are smokeless (chewing) tobacco and snuff. All of these products contain nicotine, a highly addictive substance that has harmful effects on your body. Quitting smoking means giving up all tobacco products.      7308-1455 [address], [place] [address]. All rights reserved. This information is not intended as a substitute for professional medical care. Always follow your healthcare professional's instructions.    Time: 35 minutes  Barrier: see [facility]  Willingness to learn: accepting    Courtney PINTO FNP-BC  Disease Management    Cc: Amberly Whyte NP    With the electronic record, we can now more quickly and easily track our patient diabetic goals. Our  diabetes clinical review is in progress and these are the indicators we are monitoring for good diabetes health.     1.) HbA1C less than 7 (measurement of your average blood sugars)  2.) Blood Pressure less than 140/80  3.) LDL less than 100 (bad cholesterol)  4.) HbA1C is checked in the last 6 months and below 7% (more frequently if not at goal or adjusting medications)  5.) LDL is checked in the last 12 months (more frequently if not at goal or adjusting medications)  6.) Taking one baby aspirin daily (unless otherwise instructed)  7.) No tobacco use  8) Statin use     You have achieved 6 out of 8 of these and I am encouraging you to come in and get tuned up to achieve 8 out of 8!  Here is what you have achieved so far in my goals for you:  1.) HbA1C  less than 7:                              NO  Your last  HbA1C :  Lab Results   Component Value Date    A1C 7.5 02/04/2020    A1C 7.9 01/15/2020    A1C 9.8 10/30/2019    A1C 12.0 08/29/2019    A1C 11.0 05/29/2019       2.) Blood Pressure less than 140/80:       YES      Your last    BP Readings from Last 1 Encounters:   02/04/20 118/70     3.) LDL less than 100:                              YES      Your last     LDL Cholesterol Calculated   Date Value Ref Range Status   02/04/2020 49 <100 mg/dL Final     Comment:     Desirable:       <100 mg/dl       4.) Checked HbA1C in the past 6 months: YES      5.) Checked LDL in the past 12 months:    YES      6.) Taking one aspirin daily:                       YES     7.) No tobacco use:                                        NO   8.) Statin use      YES

## 2020-02-04 NOTE — PROGRESS NOTES
"Chief Complaint   Patient presents with     Diabetes       Initial /75   Pulse 89   Resp 16   Ht 1.619 m (5' 3.75\")   Wt 89.2 kg (196 lb 9.6 oz)   SpO2 93%   BMI 34.01 kg/m   Estimated body mass index is 34.01 kg/m  as calculated from the following:    Height as of this encounter: 1.619 m (5' 3.75\").    Weight as of this encounter: 89.2 kg (196 lb 9.6 oz).  Medication Reconciliation: complete  Gege Walter LPN    "

## 2020-02-05 ENCOUNTER — TELEPHONE (OUTPATIENT)
Dept: FAMILY MEDICINE | Facility: OTHER | Age: 55
End: 2020-02-05

## 2020-02-05 NOTE — TELEPHONE ENCOUNTER
Patient home care Myesha evans looking for verbal orders to continue Home care   Please advise   Skilled nursing 1 time a week

## 2020-02-11 DIAGNOSIS — I10 ESSENTIAL HYPERTENSION: ICD-10-CM

## 2020-02-11 DIAGNOSIS — G25.0 ESSENTIAL TREMOR: ICD-10-CM

## 2020-02-13 ENCOUNTER — TRANSFERRED RECORDS (OUTPATIENT)
Dept: HEALTH INFORMATION MANAGEMENT | Facility: CLINIC | Age: 55
End: 2020-02-13

## 2020-02-13 NOTE — TELEPHONE ENCOUNTER
Lisnopril      Last Written Prescription Date:  12/20/2019  Last Fill Quantity: 30,   # refills: 1  Last Office Visit: 2/04/2020  Future Office visit:    Next 5 appointments (look out 90 days)    May 04, 2020  3:30 PM CDT  (Arrive by 3:15 PM)  Office Visit with Amberly Whyte NP  [facility] [place] ([facility] [place] ) [address]  [place] [address]  [phone]             Mysoline       Last Written Prescription Date:  12/20/2019  Last Fill Quantity: 30,   # refills: 1  Last Office Visit: 2/04/2020  Future Office visit:    Next 5 appointments (look out 90 days)    May 04, 2020  3:30 PM CDT  (Arrive by 3:15 PM)  Office Visit with Amberly Whyte NP  [facility] [place] ([facility] [place] ) [address]  [place] [address]  [phone]

## 2020-02-14 RX ORDER — PRIMIDONE 50 MG/1
TABLET ORAL
Qty: 30 TABLET | Refills: 5 | Status: SHIPPED | OUTPATIENT
Start: 2020-02-14 | End: 2020-08-12

## 2020-02-14 RX ORDER — LISINOPRIL 40 MG/1
TABLET ORAL
Qty: 30 TABLET | Refills: 5 | Status: SHIPPED | OUTPATIENT
Start: 2020-02-14 | End: 2020-08-05

## 2020-02-17 NOTE — PROGRESS NOTES
Clinic Care Coordination Contact  Care Team Conversations    CC reviewed Care Everywhere and noted the following in regard to the neurosurgery referral that was done recently:    Miscellaneous Notes  - documented in this encounter  Telephone Encounter - Shankar Duron RN - 02/12/2020 3:17 PM CST  DT reviewed referral for arachnoid cyst. DT will see patient for a consult in the clinic if she would like however the cyst is benign and he does not recommend surgery. Patient was contacted and notified of these recommendations. Patient does not want to be seen in the clinic at this time to go over MRI.    Electronically signed by Shankar Duron RN at 02/12/2020 3:31 PM CST        CC will update PCP.    Lauren Pipkin, BS-RN   Care Coordination  Primary Care- [facility]  [phone] Option # 1

## 2020-02-21 ENCOUNTER — PATIENT OUTREACH (OUTPATIENT)
Dept: CARE COORDINATION | Facility: OTHER | Age: 55
End: 2020-02-21

## 2020-02-21 ASSESSMENT — ACTIVITIES OF DAILY LIVING (ADL): DEPENDENT_IADLS:: INDEPENDENT

## 2020-02-21 NOTE — PROGRESS NOTES
Clinic Care Coordination Contact  Care Team Conversations    Outgoing call placed to Marly's home care RN Molly.   for her to call back.  CC would like to ask if Molly would be willing to assist with making sure Marly calls [employer] at [phone] to discuss the disruption in supplies.  's will not receive supplies again until the middle of March.  Pt will need to work with the company to find another supplier in the area or if there aren't any other options then the company will likely be able to provide samples.  -This is per Dara Swanson, NP Lauren Pipkin, BS-RN   Care Coordination  Primary Care- [facility]  [phone] Option # 1

## 2020-02-24 ENCOUNTER — TELEPHONE (OUTPATIENT)
Dept: FAMILY MEDICINE | Facility: OTHER | Age: 55
End: 2020-02-24

## 2020-02-24 NOTE — TELEPHONE ENCOUNTER
Pt called stating she would like to have her low dose lung Ct for lung cancer screening done here at [place] versus going to [place].  Can she please have an order placed for this?  Please advise.  JOSE ALEJANDRO BYRNE LPN

## 2020-02-25 DIAGNOSIS — J43.9 PULMONARY EMPHYSEMA, UNSPECIFIED EMPHYSEMA TYPE (H): Primary | ICD-10-CM

## 2020-02-25 NOTE — PROGRESS NOTES
Clinic Care Coordination Contact  Care Team Conversations    Incoming call from Molly with [facility].  Informed her of what is needed in order to obtain her [employer] supplies until [employer] has supplies again.  Molly is currently at [facility] and will assist with making this call today.    Lauren Pipkin, BS-RN   Care Coordination  [facility]  [phone] Option # 1

## 2020-02-26 ENCOUNTER — PATIENT OUTREACH (OUTPATIENT)
Dept: CARE COORDINATION | Facility: OTHER | Age: 55
End: 2020-02-26

## 2020-02-26 DIAGNOSIS — E11.9 TYPE 2 DIABETES, HBA1C GOAL < 7% (H): Primary | ICD-10-CM

## 2020-02-26 RX ORDER — IPRATROPIUM BROMIDE AND ALBUTEROL 20; 100 UG/1; UG/1
SPRAY, METERED RESPIRATORY (INHALATION)
Qty: 4 G | Refills: 0 | Status: SHIPPED | OUTPATIENT
Start: 2020-02-26 | End: 2020-06-24

## 2020-02-26 RX ORDER — INSULIN DEGLUDEC 100 U/ML
20 INJECTION, SOLUTION SUBCUTANEOUS DAILY
Qty: 1 VIAL | Refills: 2 | Status: SHIPPED | OUTPATIENT
Start: 2020-02-26 | End: 2020-03-18

## 2020-02-26 NOTE — PROGRESS NOTES
Clinic Care Coordination Contact  Care Team Conversations    Incoming call received from Molly with [facility].  She states she and Marly tried to call [employer] to discuss the supply issues.  They were informed that Marly was referred to the local rep and they would be in contact with her.  Unfortunately, Marly has not heard from the local rep and used her last [employer] device on 2/26.  Molly did ask if she could have the contact info for the rep but they reportedly could not share this.      CC spoke with Courtney Chaudhary NP.  Ordered Tresiba 20 units daily with syringes per verbal order from Courtney Chaudhary on 2/26/20.  CC spoke with Molly and informed her that Courtney would like Marly to take the Tresiba 20 units daily ONLY for now.  Vials/syringes sent so Molly may assist with drawing up and storing doses for Marly.  Molly will visit Marly to assist with this on 2/27/10.      Courtney did speak with [employer] herself on 2/27 and they reportedly DO NOT have a local rep.  They will be sending us some sample/starter kits to provide to the patients we have that are without supplies until the middle of March.  Will await shipment.    Lauren Pipkin, BS-RN   Care Coordination  Primary Care- [facility]  [phone] Option # 1

## 2020-02-26 NOTE — TELEPHONE ENCOUNTER
ipratropium      Last Written Prescription Date:  historical  Last Fill Quantity: ,   # refills:   Last Office Visit: 2/4/20  Future Office visit:    Next 5 appointments (look out 90 days)    May 04, 2020  3:30 PM CDT  (Arrive by 3:15 PM)  Office Visit with Amberly Whyte NP  [facility] ([facility] ) [address]  [place] [address]  [phone]

## 2020-03-11 ENCOUNTER — TELEPHONE (OUTPATIENT)
Dept: EDUCATION SERVICES | Facility: OTHER | Age: 55
End: 2020-03-11

## 2020-03-11 NOTE — TELEPHONE ENCOUNTER
Pt calls she is out of her Vgo and is calling to see if we have gotten any Vgo's in the mail. We have not, I called [facility] to see if they have but they have not. I called the pt back and notified. The pt is out of the [employer] system at this time, and states she is not using any inj's. Please leigh her and let her know what she should do.

## 2020-03-12 ENCOUNTER — TRANSFERRED RECORDS (OUTPATIENT)
Dept: HEALTH INFORMATION MANAGEMENT | Facility: CLINIC | Age: 55
End: 2020-03-12

## 2020-03-12 LAB — RETINOPATHY: NORMAL

## 2020-03-13 NOTE — TELEPHONE ENCOUNTER
Clinic Care Coordination Contact  Care Team Conversations    Outgoing call placed to Marly.  Discussed with her what she is currently using at home in regard to her insulin.  After some discussion writer was able to gather that Mraly does, in fact, have prepared insulin at home (Tresiba) which her nurse placed in the refrigerator for her when she was there on Tuesday 3/10.  She states she is injecting 20 units at bedtime as prescribed.  She would prefer the [employer] but understands it is not currently available and we did not receive samples to this date.  Writer reminded her of her appointment next Wed 3/18 with Courtney Chaudhary NP.  Also reminded her to be sure to bring her glucometer so we can review her blood sugars.  Marly verbalized understanding.  CC will reach out to her again early next week to check in after the weekend and remind her again about the glucometer.    Outgoing call also placed to Marly's home care RN Myesha.  Myesha states Marly has actually been using up the Tresiba pens she had at home and once that is gone they will start with the vials/syringes.  She reports this was Marly's preference.  CC asked that Myesha please remind Marly next Tuesday of her appointment Wed as well as remind her to bring the glucometer.    Lauren Pipkin, BS-RN   Care Coordination  Primary Care- [facility]  [phone] Option # 1

## 2020-03-18 ENCOUNTER — ALLIED HEALTH/NURSE VISIT (OUTPATIENT)
Dept: EDUCATION SERVICES | Facility: OTHER | Age: 55
End: 2020-03-18
Attending: NURSE PRACTITIONER
Payer: MEDICARE

## 2020-03-18 VITALS
WEIGHT: 197.5 LBS | OXYGEN SATURATION: 92 % | SYSTOLIC BLOOD PRESSURE: 121 MMHG | BODY MASS INDEX: 33.72 KG/M2 | HEART RATE: 86 BPM | DIASTOLIC BLOOD PRESSURE: 82 MMHG | HEIGHT: 64 IN | RESPIRATION RATE: 16 BRPM

## 2020-03-18 DIAGNOSIS — E11.65 TYPE 2 DIABETES MELLITUS WITH HYPERGLYCEMIA, WITH LONG-TERM CURRENT USE OF INSULIN (H): Primary | ICD-10-CM

## 2020-03-18 DIAGNOSIS — Z79.4 TYPE 2 DIABETES MELLITUS WITH HYPERGLYCEMIA, WITH LONG-TERM CURRENT USE OF INSULIN (H): Primary | ICD-10-CM

## 2020-03-18 DIAGNOSIS — F17.200 NICOTINE DEPENDENCE, UNCOMPLICATED, UNSPECIFIED NICOTINE PRODUCT TYPE: ICD-10-CM

## 2020-03-18 DIAGNOSIS — F17.210 NICOTINE DEPENDENCE, CIGARETTES, UNCOMPLICATED: ICD-10-CM

## 2020-03-18 PROCEDURE — G0463 HOSPITAL OUTPT CLINIC VISIT: HCPCS

## 2020-03-18 PROCEDURE — 99213 OFFICE O/P EST LOW 20 MIN: CPT | Performed by: NURSE PRACTITIONER

## 2020-03-18 RX ORDER — INSULIN DEGLUDEC INJECTION 100 U/ML
24 INJECTION, SOLUTION SUBCUTANEOUS DAILY
Qty: 1 VIAL | Refills: 2
Start: 2020-03-18 | End: 2020-05-13 | Stop reason: ALTCHOICE

## 2020-03-18 ASSESSMENT — PAIN SCALES - GENERAL: PAINLEVEL: NO PAIN (0)

## 2020-03-18 ASSESSMENT — MIFFLIN-ST. JEOR: SCORE: 1466.88

## 2020-03-18 NOTE — PROGRESS NOTES
SUBJECTIVE:  Marly Cannon, 54 year old, female presents with the following Chief Complaint(s) with HPI to follow:  Chief Complaint   Patient presents with     Diabetes        Diabetes Follow-up    Patient is checking blood sugars: 2.3x/day.  Results:  Highest: 395  Lowest: 142  Period average: 195     Values above, >180: 18  Values within goal (): 14  Values below goal, <70: 0      Symptoms of hypoglycemia (low blood sugar): none    Paresthesias (numbness or burning in feet) or sores: No    Diabetic eye exam within the last year: Yes    Breakfast eaten regularly: no really    Patient counting carbs: Yes    Exercising: not much with weather--slipped twice on the ice         HPI:  Marly's here today for the follow up regarding her Diabetes mellitus, Type 2.    Lab Results   Component Value Date    A1C Test canceled by physician 02/04/2020    A1C 7.9 01/15/2020    A1C 9.8 10/30/2019    A1C 12.0 08/29/2019    A1C 11.0 05/29/2019     Current Diabetes medication:   1. Tresiba 20 units daily  2. Ozempic 1 mg weekly (Tuesday)  Statin use: yes, simvastatin 20 mg daily  ASA: yes, 81 mg daily    Marly's here today for follow up regarding her diabetes.    Unfortunately, Marly ran out of [facility] isn't sure when they will be getting them.     Denies any new health concerns.      Patient Active Problem List   Diagnosis     Type 2 diabetes, HbA1c goal < 7% (H)     ACP (advance care planning)     Stage 3 chronic kidney disease (H)     Pulmonary emphysema (H)     Hyperparathyroidism due to renal insufficiency (H)     Morbid obesity (H)     Vitamin D deficiency     Intellectual disability     Breast fibrocystic disorder     Tobacco abuse     Microalbuminuria due to type 2 diabetes mellitus (H)     H/O colonoscopy     [place] cardiac risk <10% in next 10 years     Hypomagnesemia     Tubular adenoma of colon     Hyperlipidemia, unspecified hyperlipidemia type     Essential hypertension     Callus of  foot       History reviewed. No pertinent past medical history.    Past Surgical History:   Procedure Laterality Date     COLONOSCOPY N/A 8/20/2015    Procedure: COLONOSCOPY;  Surgeon: Gentry Porter MD;  Location: [facility] OR     COLONOSCOPY N/A 9/21/2018    Procedure: COLONOSCOPY;  COLONOSCOPY WITH POLYPECTOMY;  Surgeon: Gentry Porter MD;  Location: [facility] OR       Family History   Problem Relation Age of Onset     Diabetes Mother      Diabetes Father      Hypertension Brother      Diabetes Sister        Social History     Tobacco Use     Smoking status: Current Every Day Smoker     Packs/day: 1.00     Years: 34.00     Pack years: 34.00     Types: Cigarettes     Start date: 1/1/1979     Smokeless tobacco: Never Used     Tobacco comment: Declined QP 10/2/19   Substance Use Topics     Alcohol use: No     Alcohol/week: 0.0 standard drinks       Current Outpatient Medications   Medication Sig Dispense Refill     Acetaminophen (TYLENOL PO) Take 325 mg by mouth every 6 hours as needed        amLODIPine (NORVASC) 5 MG tablet TAKE 1 TABLET BY MOUTH DAILY 90 tablet 1     ammonium lactate (LAC-HYDRIN) 12 % cream Apply topically 2 times daily       aspirin 81 MG EC tablet Take 1 tablet (81 mg) by mouth daily 90 tablet 3     atorvastatin (LIPITOR) 40 MG tablet Take 1 tablet (40 mg) by mouth daily 90 tablet 3     blood glucose (NO BRAND SPECIFIED) test strip Use to test blood sugar 4 times daily or as directed. 300 strip 11     budesonide-formoterol (SYMBICORT) 160-4.5 MCG/ACT Inhaler Inhale 2 puffs into the lungs 2 times daily 1 Inhaler 3     Cholecalciferol (VITAMIN D-3) 1000 units CAPS Take 2,000 Units by mouth daily 90 capsule 11     COMBIVENT RESPIMAT  MCG/ACT inhaler INHALE 1 PUFF INTO THE LUNGS 4 TIMES DAILY 4 g 0     Cyclobenzaprine HCl (FLEXERIL PO) Take 10 mg by mouth 3 times daily as needed for muscle spasms       hydrocortisone 2.5 % cream Apply topically 2 times daily       Insulin Degludec (TRESIBA) 100  "UNIT/ML SOLN Inject 24 Units Subcutaneous daily 1 vial 2     insulin pen needle (32G X 4 MM) 32G X 4 MM miscellaneous Use 1 pen needles daily 100 each 3     insulin syringes, disposable, (BD INSULIN SYRINGE) U-100 1 ML MISC 1 Device daily 30 each 2     lisinopril (ZESTRIL) 40 MG tablet TAKE 1 TABLET BY MOUTH DAILY 30 tablet 5     nystatin (MYCOSTATIN) 025384 UNIT/GM external powder Apply topically 3 times daily 60 g 3     ONETOUCH DELICA LANCETS 33G MISC 1 each 3 times daily 100 each 10     order for DME Women briefs 50 each 1     primidone (MYSOLINE) 50 MG tablet TAKE 1 TABLET BY MOUTH AT BEDTIME 30 tablet 5     triamcinolone (KENALOG) 0.1 % cream Apply topically 2 times daily       insulin aspart (NOVOLOG VIAL) 100 UNITS/ML vial For use in [employer] 20. TDD: 56 units. E11.29. (Patient not taking: Reported on 3/18/2020) 30 mL 3     semaglutide (OZEMPIC, 1 MG/DOSE,) 2 MG/1.5ML pen Inject 1 mg Subcutaneous every 7 days (Patient not taking: Reported on 3/18/2020) 6 mL 3     wearable insulin delivery device (V-GO 20) kit Insulin delivery device to be changed every 24 hours (Patient not taking: Reported on 3/18/2020) 30 each 6       No Known Allergies    REVIEW OF SYSTEMS  Skin: negative  Eyes: negative   Ears/Nose/Throat: glasses; negative  Respiratory: No shortness of breath, cough, or hemoptysis;   Cardiovascular: negative  Gastrointestinal: negative  Genitourinary: negative  Musculoskeletal: negative  Neurologic: negative  Psychiatric: negative  Hematologic/Lymphatic/Immunologic: continued  Endocrine: positive for diabetes     OBJECTIVE:  /82   Pulse 86   Resp 16   Ht 1.619 m (5' 3.75\")   Wt 89.6 kg (197 lb 8 oz)   SpO2 92%   BMI 34.17 kg/m    Constitutional: healthy, alert and no distress  Musculoskeletal: extremities normal- no gross deformities noted and gait normal  Skin: no suspicious lesions or rashes to visible skin  Psychiatric: mentation appears normal and affect normal/bright    LABS  No results " 24-Feb-2019 02:47 found for any visits on 03/18/20.    ASSESSMENT / PLAN:  (E11.65,  Z79.4) Type 2 diabetes mellitus with hyperglycemia, with long-term current use of insulin (H)  (primary encounter diagnosis)  Comment: noted BG, which her average is slightly up  Plan:   Will increase Tresiba to 24 units daily  Left message for Myesha BANSAL RN home care about new plan  Josie KABA RN Care Coordinator is also aware    Will let Marly know when I hear from [employer]    (F17.210) Nicotine dependence, cigarettes, uncomplicated   Comment: noted and refuse  Plan: SMOKING CESSATION COUNSELING 3-10 MIN          Marly's aware to let us know when she's ready to quit smoking.  Discussed the dangers of smoking with diabetes    (F17.200) Nicotine dependence, uncomplicated, unspecified nicotine product type  Comment: noted  Plan: SMOKING CESSATION COUNSELING 3-10 MIN          As mentioned above    Follow up as discussed  Sooner if needed    Patient Instructions   Continue working on healthy eating and moving (start low and slow, work up to 30 min, 5x/week)    BG goals:  Fasting and before meals <130, >70  2 hour after eating <180    We only need 1/2 of these numbers to be within target then your A1c will be within target    Medication changes   1.  Tresiba   Increase to 24 units daily    2.  Ozempic  Keep using Ozempic 1mg weekly    Follow up   Follow up with Amberly Whyte NP      Call me sooner if any problems/concerns and/or questions develop including consistent low BGs <70 or consistent high BGs >200  [phone] (Unit Coordinator)    [phone] (Nurse)        Time: 35 minutes  Barrier: see PMH  Willingness to learn: accepting    Courtney PINTO [facility]  Disease Management    Cc: Amberly Whyte NP    With the electronic record, we can now more quickly and easily track our patient diabetic goals. Our diabetes clinical review is in progress and these are the indicators we are monitoring for good diabetes health.     1.) HbA1C less than 7  (measurement of your average blood sugars)  2.) Blood Pressure less than 140/80  3.) LDL less than 100 (bad cholesterol)  4.) HbA1C is checked in the last 6 months and below 7% (more frequently if not at goal or adjusting medications)  5.) LDL is checked in the last 12 months (more frequently if not at goal or adjusting medications)  6.) Taking one baby aspirin daily (unless otherwise instructed)  7.) No tobacco use  8) Statin use     You have achieved 6 out of 8 of these and I am encouraging you to come in and get tuned up to achieve 8 out of 8!  Here is what you have achieved so far in my goals for you:  1.) HbA1C  less than 7:                              NO  Your last  HbA1C :  Lab Results   Component Value Date    A1C Test canceled by physician 02/04/2020    A1C 7.9 01/15/2020    A1C 9.8 10/30/2019    A1C 12.0 08/29/2019    A1C 11.0 05/29/2019       2.) Blood Pressure less than 140/80:       YES      Your last    BP Readings from Last 1 Encounters:   03/18/20 121/82     3.) LDL less than 100:                              YES      Your last     LDL Cholesterol Calculated   Date Value Ref Range Status   02/04/2020 49 <100 mg/dL Final     Comment:     Desirable:       <100 mg/dl       4.) Checked HbA1C in the past 6 months: YES      5.) Checked LDL in the past 12 months:    YES      6.) Taking one aspirin daily:                       YES     7.) No tobacco use:                                        NO   8.) Statin use      YES

## 2020-03-18 NOTE — PROGRESS NOTES
"Chief Complaint   Patient presents with     Diabetes       Initial /82   Pulse 86   Resp 16   Ht 1.619 m (5' 3.75\")   Wt 89.6 kg (197 lb 8 oz)   SpO2 92%   BMI 34.17 kg/m   Estimated body mass index is 34.17 kg/m  as calculated from the following:    Height as of this encounter: 1.619 m (5' 3.75\").    Weight as of this encounter: 89.6 kg (197 lb 8 oz).  Medication Reconciliation: complete  Gege Walter LPN    "

## 2020-03-18 NOTE — PATIENT INSTRUCTIONS
Continue working on healthy eating and moving (start low and slow, work up to 30 min, 5x/week)    BG goals:  Fasting and before meals <130, >70  2 hour after eating <180    We only need 1/2 of these numbers to be within target then your A1c will be within target    Medication changes   1.  Tresiba   Increase to 24 units daily    2.  Ozempic  Keep using Ozempic 1mg weekly    Follow up   Follow up with Amberly Whyte NP      Call me sooner if any problems/concerns and/or questions develop including consistent low BGs <70 or consistent high BGs >200  [phone] (Unit Coordinator)    [phone] (Nurse)

## 2020-03-18 NOTE — TELEPHONE ENCOUNTER
Clinic Care Coordination Contact  Care Team Conversations    Pt was seen by Courtney Chaudhary NP in clinic today 3/18/20.  CC was updated by provider.    Lauren Pipkin, BS-RN   Care Coordination  Primary [facility]  [phone] Option # 1

## 2020-03-24 DIAGNOSIS — Z79.4 TYPE 2 DIABETES MELLITUS WITH HYPERGLYCEMIA, WITH LONG-TERM CURRENT USE OF INSULIN (H): ICD-10-CM

## 2020-03-24 DIAGNOSIS — E11.65 TYPE 2 DIABETES MELLITUS WITH HYPERGLYCEMIA, WITH LONG-TERM CURRENT USE OF INSULIN (H): ICD-10-CM

## 2020-03-24 RX ORDER — INSULIN DEGLUDEC 100 U/ML
INJECTION, SOLUTION SUBCUTANEOUS
Qty: 30 ML | Refills: 0 | OUTPATIENT
Start: 2020-03-24

## 2020-03-26 RX ORDER — ASPIRIN 81 MG/1
TABLET, CHEWABLE ORAL
Qty: 90 TABLET | Refills: 0 | OUTPATIENT
Start: 2020-03-26

## 2020-03-26 NOTE — TELEPHONE ENCOUNTER
Aspirin 81 mg      Last Written Prescription Date:  08/07/2019  Last Fill Quantity: 90,   # refills: 3  Last Office Visit: 02/13/2020  Future Office visit:    Next 5 appointments (look out 90 days)    May 04, 2020  3:30 PM CDT  (Arrive by 3:15 PM)  Office Visit with Amberly Whyte NP  [facility] ([facility] ) [address]  [place] [address]  [phone]           Routing refill request to provider for review/approval because:  Drug not on the [facility], [facility] or [facility] refill protocol or controlled substance

## 2020-04-06 ENCOUNTER — TELEPHONE (OUTPATIENT)
Dept: FAMILY MEDICINE | Facility: OTHER | Age: 55
End: 2020-04-06

## 2020-04-06 NOTE — TELEPHONE ENCOUNTER
Myesha from [facility] calling for verbal orders to continue weekly nursing. Please call back [phone]. Ashley A. Lechevalier, LPN on 4/6/2020 at 3:18 PM

## 2020-05-03 NOTE — PROGRESS NOTES
"Marly Cannon is a 54 year old female who is being evaluated via a billable telephone visit.      The patient has been notified of following:     \"This telephone visit will be conducted via a call between you and your physician/provider. We have found that certain health care needs can be provided without the need for a physical exam.  This service lets us provide the care you need with a short phone conversation.  If a prescription is necessary we can send it directly to your pharmacy.  If lab work is needed we can place an order for that and you can then stop by our lab to have the test done at a later time.    Telephone visits are billed at different rates depending on your insurance coverage. During this emergency period, for some insurers they may be billed the same as an in-person visit.  Please reach out to your insurance provider with any questions.    If during the course of the call the physician/provider feels a telephone visit is not appropriate, you will not be charged for this service.\"    Patient has given verbal consent for Telephone visit?  Yes    What phone number would you like to be contacted at? [phone]    How would you like to obtain your AVS? Mail a copy    Subjective     Marly Cannon is a 54 year old female who presents to clinic today for the following health issues:    HPI  Diabetes Follow-up    How often are you checking your blood sugar? Three times daily  Blood sugar testing frequency justification:  Uncontrolled diabetes and Adjustment of medication(s)  What time of day are you checking your blood sugars (select all that apply)?  Before meals and At bedtime  Have you had any blood sugars above 200?  No  Have you had any blood sugars below 70?  No    What symptoms do you notice when your blood sugar is low?  Shaky and Weak    What concerns do you have today about your diabetes? None     Do you have any of these symptoms? (Select all that apply)  No numbness or tingling in feet.  No " redness, sores or blisters on feet.  No complaints of excessive thirst.  No reports of blurry vision.  No significant changes to weight.     A1C was last done on 1/15/20 and it was 7.9. [facility]     Fasting glucose around 130.   -She denies chest pain, shortness of breath, dizziness, palpitations, or syncope.  -Microalbuminuria present. On ACE.   -She does have chronic kidney disease. Avoids NSAIDs.   -Taking Ozempic once weekly. She also notes that she does Tresiba each night. Do note, she has trouble giving herself insulin, but the home care nurse helps her.               Hyperlipidemia Follow-Up      Are you regularly taking any medication or supplement to lower your cholesterol?   Yes- lipitor    Are you having muscle aches or other side effects that you think could be caused by your cholesterol lowering medication?  No     She denies chest pain, shortness of breath, dizziness, palpitations, or syncope.    Also taking 81 mg asa.     Hypertension Follow-up      Do you check your blood pressure regularly outside of the clinic? No     Are you following a low salt diet? Yes    Are your blood pressures ever more than 140 on the top number (systolic) OR more   than 90 on the bottom number (diastolic), for example 140/90? No   -She denies chest pain, shortness of breath, dizziness, syncope, or palpitations.  -She currently is taking lisinopril and amlodipine without side effects.    Patient also has pulmonary emphysema. Feels it is well controlled. Using Combivent and Symbicort without side effects. Continues to smoke, smoking 1-2 ppd. No interest in quitting. No fevers. No blood in sputum.     Patient also notes that she had one episode of rectal bleeding. She notes that she was wiping herself about one week ago and the toilet paper had a streak of bright red blood. She notes that she often has trouble having a bowel movement and her stools are often hard. No dizziness or lightheadedness. She had  a colonoscopy in 2018 and a tubular adenoma was seen. Recommended repeating in 5 years. She notes that she eats fruit on occasion, but rarely eats vegetables. Also drinks very little water. Feels she is probably constipated, but she does not take anything for this.     BP Readings from Last 2 Encounters:   03/18/20 121/82   02/04/20 118/70     Hemoglobin A1C (%)   Date Value   05/04/2020 8.1 (H)   02/04/2020 Test canceled by physician     LDL Cholesterol Calculated (mg/dL)   Date Value   02/04/2020 49   03/29/2019 72         How many servings of fruits and vegetables do you eat daily?  2-3    On average, how many sweetened beverages do you drink each day (Examples: soda, juice, sweet tea, etc.  Do NOT count diet or artificially sweetened beverages)?   0    How many days per week do you exercise enough to make your heart beat faster? 3 or less    How many minutes a day do you exercise enough to make your heart beat faster? 10 - 19    How many days per week do you miss taking your medication? 0    Patient Active Problem List   Diagnosis     Type 2 diabetes, HbA1c goal < 7% (H)     ACP (advance care planning)     Stage 3 chronic kidney disease (H)     Pulmonary emphysema (H)     Hyperparathyroidism due to renal insufficiency (H)     Morbid obesity (H)     Vitamin D deficiency     Intellectual disability     Breast fibrocystic disorder     Tobacco abuse     Microalbuminuria due to type 2 diabetes mellitus (H)     H/O colonoscopy     [place] cardiac risk <10% in next 10 years     Hypomagnesemia     Tubular adenoma of colon     Hyperlipidemia, unspecified hyperlipidemia type     Essential hypertension     Callus of foot     Past Surgical History:   Procedure Laterality Date     COLONOSCOPY N/A 8/20/2015    Procedure: COLONOSCOPY;  Surgeon: Gentry Porter MD;  Location: [facility]     COLONOSCOPY N/A 9/21/2018    Procedure: COLONOSCOPY;  COLONOSCOPY WITH POLYPECTOMY;  Surgeon: Gentry Porter MD;  Location: [facility]        Social History     Tobacco Use     Smoking status: Current Every Day Smoker     Packs/day: 1.00     Years: 34.00     Pack years: 34.00     Types: Cigarettes     Start date: 1/1/1979     Smokeless tobacco: Never Used     Tobacco comment: Declined QP 10/2/19   Substance Use Topics     Alcohol use: No     Alcohol/week: 0.0 standard drinks     Family History   Problem Relation Age of Onset     Diabetes Mother      Diabetes Father      Hypertension Brother      Diabetes Sister          Current Outpatient Medications   Medication Sig Dispense Refill     Acetaminophen (TYLENOL PO) Take 325 mg by mouth every 6 hours as needed        amLODIPine (NORVASC) 5 MG tablet TAKE 1 TABLET BY MOUTH DAILY 90 tablet 1     ammonium lactate (LAC-HYDRIN) 12 % cream Apply topically 2 times daily       aspirin 81 MG EC tablet Take 1 tablet (81 mg) by mouth daily 90 tablet 3     atorvastatin (LIPITOR) 40 MG tablet Take 1 tablet (40 mg) by mouth daily 90 tablet 3     blood glucose (NO BRAND SPECIFIED) test strip Use to test blood sugar 4 times daily or as directed. 300 strip 11     budesonide-formoterol (SYMBICORT) 160-4.5 MCG/ACT Inhaler Inhale 2 puffs into the lungs 2 times daily 1 Inhaler 3     Cholecalciferol (VITAMIN D-3) 1000 units CAPS Take 2,000 Units by mouth daily 90 capsule 11     COMBIVENT RESPIMAT  MCG/ACT inhaler INHALE 1 PUFF INTO THE LUNGS 4 TIMES DAILY 4 g 0     Cyclobenzaprine HCl (FLEXERIL PO) Take 10 mg by mouth 3 times daily as needed for muscle spasms       hydrocortisone 2.5 % cream Apply topically 2 times daily       Insulin Degludec (TRESIBA) 100 UNIT/ML SOLN Inject 24 Units Subcutaneous daily 1 vial 2     insulin pen needle (32G X 4 MM) 32G X 4 MM miscellaneous Use 1 pen needles daily 100 each 3     insulin syringes, disposable, (BD INSULIN SYRINGE) U-100 1 ML MISC 1 Device daily 30 each 2     lisinopril (ZESTRIL) 40 MG tablet TAKE 1 TABLET BY MOUTH DAILY 30 tablet 5     nystatin (MYCOSTATIN) 700431  UNIT/GM external powder Apply topically 3 times daily 60 g 3     ONETOUCH DELICA LANCETS 33G MISC 1 each 3 times daily 100 each 10     order for DME Women briefs 50 each 1     polyethylene glycol (MIRALAX) 17 GM/SCOOP powder Take 17 g (1 capful) by mouth daily 1 Bottle 3     primidone (MYSOLINE) 50 MG tablet TAKE 1 TABLET BY MOUTH AT BEDTIME 30 tablet 5     semaglutide (OZEMPIC, 1 MG/DOSE,) 2 MG/1.5ML pen Inject 1 mg Subcutaneous every 7 days 6 mL 3     triamcinolone (KENALOG) 0.1 % cream Apply topically 2 times daily       No Known Allergies    Reviewed and updated as needed this visit by Provider         Review of Systems   As noted in the HPI.        Objective     Diagnostic Test Results:  Labs reviewed in Epic  Results for orders placed or performed in visit on 05/04/20 (from the past 24 hour(s))   Hemoglobin A1c   Result Value Ref Range    Hemoglobin A1C 8.1 (H) 0 - 5.6 %   Basic metabolic panel   Result Value Ref Range    Sodium 137 133 - 144 mmol/L    Potassium 3.6 3.4 - 5.3 mmol/L    Chloride 102 94 - 109 mmol/L    Carbon Dioxide 28 20 - 32 mmol/L    Anion Gap 7 3 - 14 mmol/L    Glucose 197 (H) 70 - 99 mg/dL    Urea Nitrogen 18 7 - 30 mg/dL    Creatinine 1.07 (H) 0.52 - 1.04 mg/dL    GFR Estimate 58 (L) >60 mL/min/[1.73_m2]    GFR Estimate If Black 67 >60 mL/min/[1.73_m2]    Calcium 9.8 8.5 - 10.1 mg/dL   Estimated Average Glucose   Result Value Ref Range    Estimated Average Glucose 186 mg/dL           Assessment/Plan:  (J43.9) Pulmonary emphysema, unspecified emphysema type (H)  (primary encounter diagnosis)  Plan: Controlled. Continue current inhalers.     (I10) Essential hypertension  Plan: Controlled at previous visit. Will continue current medications. Encouraged daily exercise and a low sodium diet. Recommended checking BP's 2x/wk, call the clinic if consistantly s>140 or d>90. Follow up in the clinic once the COVID pandemic is over.     (E78.5) Hyperlipidemia, unspecified hyperlipidemia type  Plan:  On statin. Will continue. AST/ALR normal in 12/2019    (E11.29,  R80.9) Microalbuminuria due to type 2 diabetes mellitus (H)  Plan: On ACE. Will continue.     (N18.3) Chronic kidney disease, stage 3 (moderate) (H)  Plan: Stable. Will avoid NSAIDs and work on better DM control. BP well controlled. Will limit sodium intake.     (Z72.0) Tobacco abuse  Plan: Cessation encouraged.     (E11.9) Type 2 diabetes, HbA1c goal < 7% (H)  Plan: A1C 8.1. Will refer to the DM for management. On statin. On ACE. BP at goal. On asa. Follow up in the clinic in 3 months. Sooner with new or worsening symptoms.     (K59.00) Constipation, unspecified constipation type  (K62.5) Rectal bleeding  Plan: Rectal bleeding most likely r/t constipation as she notes that her stools are hard and it only occurred once when wiping. She drinks very little water and eats few fruits and veggies. Denies any signs of anemia. She was encouraged to increase her water intake and fruits and veggies. Will also begin a capful of polyethylene glycol (MIRALAX) 17 GM/SCOOP powder daily. Will then see her in clinic in 2 weeks for reassessment. Sooner with new or worsening symptoms.         No follow-ups on file.      Phone call duration:  8 minutes    Amberly Whyte NP

## 2020-05-04 ENCOUNTER — TELEPHONE (OUTPATIENT)
Dept: EDUCATION SERVICES | Facility: HOSPITAL | Age: 55
End: 2020-05-04

## 2020-05-04 ENCOUNTER — APPOINTMENT (OUTPATIENT)
Dept: LAB | Facility: OTHER | Age: 55
End: 2020-05-04
Attending: NURSE PRACTITIONER
Payer: MEDICARE

## 2020-05-04 ENCOUNTER — VIRTUAL VISIT (OUTPATIENT)
Dept: FAMILY MEDICINE | Facility: OTHER | Age: 55
End: 2020-05-04
Attending: NURSE PRACTITIONER
Payer: MEDICARE

## 2020-05-04 DIAGNOSIS — E11.29 MICROALBUMINURIA DUE TO TYPE 2 DIABETES MELLITUS (H): ICD-10-CM

## 2020-05-04 DIAGNOSIS — N18.30 CHRONIC KIDNEY DISEASE, STAGE 3 (MODERATE): ICD-10-CM

## 2020-05-04 DIAGNOSIS — I10 ESSENTIAL HYPERTENSION: ICD-10-CM

## 2020-05-04 DIAGNOSIS — E11.9 TYPE 2 DIABETES, HBA1C GOAL < 7% (H): ICD-10-CM

## 2020-05-04 DIAGNOSIS — K62.5 RECTAL BLEEDING: ICD-10-CM

## 2020-05-04 DIAGNOSIS — E11.9 TYPE 2 DIABETES, HBA1C GOAL < 7% (H): Primary | ICD-10-CM

## 2020-05-04 DIAGNOSIS — Z72.0 TOBACCO ABUSE: ICD-10-CM

## 2020-05-04 DIAGNOSIS — E78.5 HYPERLIPIDEMIA, UNSPECIFIED HYPERLIPIDEMIA TYPE: ICD-10-CM

## 2020-05-04 DIAGNOSIS — J43.9 PULMONARY EMPHYSEMA, UNSPECIFIED EMPHYSEMA TYPE (H): Primary | ICD-10-CM

## 2020-05-04 DIAGNOSIS — K59.00 CONSTIPATION, UNSPECIFIED CONSTIPATION TYPE: ICD-10-CM

## 2020-05-04 DIAGNOSIS — R80.9 MICROALBUMINURIA DUE TO TYPE 2 DIABETES MELLITUS (H): ICD-10-CM

## 2020-05-04 LAB
ANION GAP SERPL CALCULATED.3IONS-SCNC: 7 MMOL/L (ref 3–14)
BUN SERPL-MCNC: 18 MG/DL (ref 7–30)
CALCIUM SERPL-MCNC: 9.8 MG/DL (ref 8.5–10.1)
CHLORIDE SERPL-SCNC: 102 MMOL/L (ref 94–109)
CO2 SERPL-SCNC: 28 MMOL/L (ref 20–32)
CREAT SERPL-MCNC: 1.07 MG/DL (ref 0.52–1.04)
EST. AVERAGE GLUCOSE BLD GHB EST-MCNC: 186 MG/DL
GFR SERPL CREATININE-BSD FRML MDRD: 58 ML/MIN/{1.73_M2}
GLUCOSE SERPL-MCNC: 197 MG/DL (ref 70–99)
HBA1C MFR BLD: 8.1 % (ref 0–5.6)
POTASSIUM SERPL-SCNC: 3.6 MMOL/L (ref 3.4–5.3)
SODIUM SERPL-SCNC: 137 MMOL/L (ref 133–144)

## 2020-05-04 PROCEDURE — 36415 COLL VENOUS BLD VENIPUNCTURE: CPT | Performed by: NURSE PRACTITIONER

## 2020-05-04 PROCEDURE — 83036 HEMOGLOBIN GLYCOSYLATED A1C: CPT | Mod: ZL | Performed by: NURSE PRACTITIONER

## 2020-05-04 PROCEDURE — 40000788 ZZHCL STATISTIC ESTIMATED AVERAGE GLUCOSE: Mod: ZL | Performed by: NURSE PRACTITIONER

## 2020-05-04 PROCEDURE — 80048 BASIC METABOLIC PNL TOTAL CA: CPT | Performed by: NURSE PRACTITIONER

## 2020-05-04 PROCEDURE — 99441 ZZC PHYSICIAN TELEPHONE EVALUATION 5-10 MIN: CPT | Performed by: NURSE PRACTITIONER

## 2020-05-04 RX ORDER — POLYETHYLENE GLYCOL 3350 17 G/17G
1 POWDER, FOR SOLUTION ORAL DAILY
Qty: 1 BOTTLE | Refills: 3 | Status: SHIPPED | OUTPATIENT
Start: 2020-05-04 | End: 2020-09-11

## 2020-05-04 NOTE — TELEPHONE ENCOUNTER
----- Message from Kat Dawn RN sent at 5/4/2020  3:46 PM CDT -----  Regarding: patient telephone appointment  Please call Marly to set up a phone visit with me for insulin adjust.    Thanks!

## 2020-05-08 NOTE — TELEPHONE ENCOUNTER
Clinic Care Coordination Contact  Care Team Conversations    Pt should have appt with Courtney Chaudhary NP next week if possible.  Will see if [facility] can call to move appt up from 5/29.    Courtney may be able to re-order V-Go with new information provided by Kat Dawn RN..    Lauren Pipkin, BS-RN   Care Coordination  Primary Care- [facility]  [phone] Option # 1

## 2020-05-13 ENCOUNTER — VIRTUAL VISIT (OUTPATIENT)
Dept: EDUCATION SERVICES | Facility: OTHER | Age: 55
End: 2020-05-13
Attending: NURSE PRACTITIONER
Payer: MEDICARE

## 2020-05-13 DIAGNOSIS — E11.65 TYPE 2 DIABETES MELLITUS WITH HYPERGLYCEMIA, WITH LONG-TERM CURRENT USE OF INSULIN (H): Primary | ICD-10-CM

## 2020-05-13 DIAGNOSIS — Z79.4 TYPE 2 DIABETES MELLITUS WITH HYPERGLYCEMIA, WITH LONG-TERM CURRENT USE OF INSULIN (H): Primary | ICD-10-CM

## 2020-05-13 PROCEDURE — 99443 ZZC PHYSICIAN TELEPHONE EVALUATION 21-30 MIN: CPT | Performed by: NURSE PRACTITIONER

## 2020-05-13 RX ORDER — SEMAGLUTIDE 1.34 MG/ML
1 INJECTION, SOLUTION SUBCUTANEOUS
Qty: 6 ML | Refills: 3 | Status: SHIPPED | OUTPATIENT
Start: 2020-05-13 | End: 2020-09-16

## 2020-05-13 ASSESSMENT — PAIN SCALES - GENERAL: PAINLEVEL: NO PAIN (0)

## 2020-05-13 NOTE — PROGRESS NOTES
"Marly Cannon is a 54 year old female who is being evaluated via a billable telephone visit.      The patient has been notified of following:     \"This telephone visit will be conducted via a call between you and your physician/provider. We have found that certain health care needs can be provided without the need for a physical exam.  This service lets us provide the care you need with a short phone conversation.  If a prescription is necessary we can send it directly to your pharmacy.  If lab work is needed we can place an order for that and you can then stop by our lab to have the test done at a later time.    Telephone visits are billed at different rates depending on your insurance coverage. During this emergency period, for some insurers they may be billed the same as an in-person visit.  Please reach out to your insurance provider with any questions.    If during the course of the call the physician/provider feels a telephone visit is not appropriate, you will not be charged for this service.\"    Patient has given verbal consent for Telephone visit?  Yes    What phone number would you like to be contacted at? [phone]    How would you like to obtain your AVS? Mail a copy    Subjective     Marly Cannon is a 54 year old female who presents to clinic today for the following health issues:    HPI  Diabetes Follow-up    How often are you checking your blood sugar? Three times daily  Blood sugar testing frequency justification:  none  What time of day are you checking your blood sugars (select all that apply)?  Before meals  Have you had any blood sugars above 200?  Yes 213  Have you had any blood sugars below 70?  No    What symptoms do you notice when your blood sugar is low?  Shaky    What concerns do you have today about your diabetes? None     Do you have any of these symptoms? (Select all that apply)  No numbness or tingling in feet.  No redness, sores or blisters on feet.  No complaints of excessive " "thirst.  No reports of blurry vision.  No significant changes to weight.      BP Readings from Last 2 Encounters:   03/18/20 121/82   02/04/20 118/70     Hemoglobin A1C (%)   Date Value   05/04/2020 8.1 (H)   02/04/2020 Test canceled by physician     LDL Cholesterol Calculated (mg/dL)   Date Value   02/04/2020 49   03/29/2019 72                 How many servings of fruits and vegetables do you eat daily?  2-3    On average, how many sweetened beverages do you drink each day (Examples: soda, juice, sweet tea, etc.  Do NOT count diet or artificially sweetened beverages)?   1    How many days per week do you exercise enough to make your heart beat faster? 3 or less    How many minutes a day do you exercise enough to make your heart beat faster? 30 - 60    How many days per week do you miss taking your medication? 0    Telephone visit with Marly regarding her Diabetes mellitus, Type 2.    Lab Results   Component Value Date    A1C 8.1 05/04/2020    A1C Test canceled by physician 02/04/2020    A1C 7.9 01/15/2020    A1C 9.8 10/30/2019    A1C 12.0 08/29/2019     Current Diabetes medication:   1. Tresiba 22 units daily  2. Ozempic 1 mg weekly (Tuesday)  Statin use: yes, simvastatin 20 mg daily  ASA: yes, 81 mg daily     Marly reports missing Tresiba \"about 2x/week\" due to the burning sensation after it's administered.      Reports a dry cough.  No fevers.  No other symptoms.   Continues to smoke      Denies any new health concerns.    Reports BG average: 200s       Patient Active Problem List   Diagnosis     Type 2 diabetes, HbA1c goal < 7% (H)     ACP (advance care planning)     Stage 3 chronic kidney disease (H)     Pulmonary emphysema (H)     Hyperparathyroidism due to renal insufficiency (H)     Morbid obesity (H)     Vitamin D deficiency     Intellectual disability     Breast fibrocystic disorder     Tobacco abuse     Microalbuminuria due to type 2 diabetes mellitus (H)     H/O colonoscopy     [place] cardiac " risk <10% in next 10 years     Hypomagnesemia     Tubular adenoma of colon     Hyperlipidemia, unspecified hyperlipidemia type     Essential hypertension     Callus of foot     Past Surgical History:   Procedure Laterality Date     COLONOSCOPY N/A 8/20/2015    Procedure: COLONOSCOPY;  Surgeon: Gentry Porter MD;  Location: [facility]     COLONOSCOPY N/A 9/21/2018    Procedure: COLONOSCOPY;  COLONOSCOPY WITH POLYPECTOMY;  Surgeon: Gentry Porter MD;  Location: [facility]       Social History     Tobacco Use     Smoking status: Current Every Day Smoker     Packs/day: 1.00     Years: 34.00     Pack years: 34.00     Types: Cigarettes     Start date: 1/1/1979     Smokeless tobacco: Never Used     Tobacco comment: Declined QP 05/13/2020   Substance Use Topics     Alcohol use: No     Alcohol/week: 0.0 standard drinks     Family History   Problem Relation Age of Onset     Diabetes Mother      Diabetes Father      Hypertension Brother      Diabetes Sister            Reviewed and updated as needed this visit by Provider         Review of Systems   Skin: negative  Eyes: negative   Ears/Nose/Throat: glasses; negative  Respiratory: No shortness of breath, cough, or hemoptysis;   Cardiovascular: negative  Gastrointestinal: negative  Genitourinary: negative  Musculoskeletal: negative  Neurologic: negative  Psychiatric: negative  Hematologic/Lymphatic/Immunologic: continued  Endocrine: positive for diabetes        Objective   Reported vitals:  There were no vitals taken for this visit.   alert and no distress  PSYCH: Alert and oriented times 3; coherent speech, normal   rate and volume, able to articulate logical thoughts, able   to abstract reason, no tangential thoughts, no hallucinations   or delusions  Her affect is normal  RESP: No audible wheezing, able to talk in full sentences.  Coughed a couple times  Remainder of exam unable to be completed due to telephone visits    Diagnostic Test Results:  Labs reviewed in Epic         Assessment/Plan:  1. Type 2 diabetes mellitus with hyperglycemia, with long-term current use of insulin (H)  Noted reports BGs    - wearable insulin delivery device (V-GO 20) kit; Insulin delivery device to be changed every 24 hours  Dispense: 30 each; Refill: 4  - insulin aspart (NOVOLOG VIAL) 100 UNITS/ML vial; To be used with the V20.  TDD: 56  Dispense: 20 mL; Refill: 6  - semaglutide (OZEMPIC, 1 MG/DOSE,) 2 MG/1.5ML pen; Inject 1 mg Subcutaneous every 7 days  Dispense: 6 mL; Refill: 3    Plan:   Restart Vgo20  Stop Tresiba now as  is able to get her the Vgo by Friday.      No follow-ups on file.    Patient Instructions   Continue working on healthy eating and moving (start low and slow, work up to 30 min, 5x/week)    BG goals:  Fasting and before meals <130, >70  2 hour after eating <180    We only need 1/2 of these numbers to be within target then your A1c will be within target    Medication changes   Stop Tresiba  Start VGo 20 this Friday    Follow up   1 month      Call me sooner if any problems/concerns and/or questions develop including consistent low BGs <70 or consistent high BGs >200  [phone] (Unit Coordinator)    [phone] (Nurse)            Phone call duration:  22 minutes    BLAIR Browning CNP  May 13, 2020  3:21 PM

## 2020-05-13 NOTE — PATIENT INSTRUCTIONS
Continue working on healthy eating and moving (start low and slow, work up to 30 min, 5x/week)    BG goals:  Fasting and before meals <130, >70  2 hour after eating <180    We only need 1/2 of these numbers to be within target then your A1c will be within target    Medication changes   Stop Tresiba  Start VGo 20 this Friday    Follow up   1 month      Call me sooner if any problems/concerns and/or questions develop including consistent low BGs <70 or consistent high BGs >200  [phone] (Unit Coordinator)    [phone] (Nurse)

## 2020-05-19 NOTE — PROGRESS NOTES
Subjective     Marly Cannon is a 54 year old female who presents to clinic today for the following health issues:    HPI   Constipation/Rectal Bleeding    A virtual visit was last done on 5/4/20 for chronic disease management and patient noted that she had one episode of rectal bleeding. See note. She felt she was constipated and MiraLax was recommended. She was also encouraged to increased her intake of fruits and veggies and increase her water intake.     Since this appointment, she has been trying to drink more water-about 1 bottle daily-and increase her fruits and veggies. Also using the MiraLax daily. BMs are still hard, going about every other day. Still has occasional blood on toilet paper and mixed in with stool.        Duration: months    Description:       Frequency of bowel movements: every other day       Consistency of stool: medium stool, sometimes hard    Intensity:  moderate    Accompanying signs and symptoms: none       Abdominal pain: no        Rectal pain: no        Blood in stool: no        Nausea/vomitting: no     History:        Similar problems in past: no     Precipitating or alleviating factors: none       Medications worsening symptoms: no     Therapies tried and outcome: miralax       Chronic laxative use: no     She had a colonoscopy in 2018 and a tubular adenoma was seen. Recommended repeating in 5 years      Patient Active Problem List   Diagnosis     Type 2 diabetes, HbA1c goal < 7% (H)     ACP (advance care planning)     Stage 3 chronic kidney disease (H)     Pulmonary emphysema (H)     Hyperparathyroidism due to renal insufficiency (H)     Morbid obesity (H)     Vitamin D deficiency     Intellectual disability     Breast fibrocystic disorder     Tobacco abuse     Microalbuminuria due to type 2 diabetes mellitus (H)     H/O colonoscopy     [place] cardiac risk <10% in next 10 years     Hypomagnesemia     Tubular adenoma of colon     Hyperlipidemia, unspecified hyperlipidemia  type     Essential hypertension     Callus of foot     Past Surgical History:   Procedure Laterality Date     COLONOSCOPY N/A 8/20/2015    Procedure: COLONOSCOPY;  Surgeon: Gentry Porter MD;  Location: [facility]     COLONOSCOPY N/A 9/21/2018    Procedure: COLONOSCOPY;  COLONOSCOPY WITH POLYPECTOMY;  Surgeon: Gentry Porter MD;  Location: [facility]       Social History     Tobacco Use     Smoking status: Current Every Day Smoker     Packs/day: 1.00     Years: 34.00     Pack years: 34.00     Types: Cigarettes     Start date: 1/1/1979     Smokeless tobacco: Never Used     Tobacco comment: Declined QP 05/13/2020   Substance Use Topics     Alcohol use: No     Alcohol/week: 0.0 standard drinks     Family History   Problem Relation Age of Onset     Diabetes Mother      Diabetes Father      Hypertension Brother      Diabetes Sister          Current Outpatient Medications   Medication Sig Dispense Refill     Acetaminophen (TYLENOL PO) Take 325 mg by mouth every 6 hours as needed        amLODIPine (NORVASC) 5 MG tablet TAKE 1 TABLET BY MOUTH DAILY 90 tablet 1     ammonium lactate (LAC-HYDRIN) 12 % cream Apply topically 2 times daily       aspirin 81 MG EC tablet Take 1 tablet (81 mg) by mouth daily 90 tablet 3     atorvastatin (LIPITOR) 40 MG tablet Take 1 tablet (40 mg) by mouth daily 90 tablet 3     blood glucose (NO BRAND SPECIFIED) test strip Use to test blood sugar 4 times daily or as directed. 300 strip 11     budesonide-formoterol (SYMBICORT) 160-4.5 MCG/ACT Inhaler Inhale 2 puffs into the lungs 2 times daily 1 Inhaler 3     Cholecalciferol (VITAMIN D-3) 1000 units CAPS Take 2,000 Units by mouth daily 90 capsule 11     COMBIVENT RESPIMAT  MCG/ACT inhaler INHALE 1 PUFF INTO THE LUNGS 4 TIMES DAILY 4 g 0     Cyclobenzaprine HCl (FLEXERIL PO) Take 10 mg by mouth 3 times daily as needed for muscle spasms       docusate sodium (COLACE) 50 MG capsule Take 1 capsule (50 mg) by mouth 2 times daily as needed for  "constipation 60 capsule 1     hydrocortisone (CORTAID) 1 % external cream Apply topically 2 times daily 453.6 g 0     hydrocortisone 2.5 % cream Apply topically 2 times daily       insulin aspart (NOVOLOG VIAL) 100 UNITS/ML vial To be used with the V20.  TDD: 56 20 mL 6     insulin pen needle (32G X 4 MM) 32G X 4 MM miscellaneous Use 1 pen needles daily 100 each 3     insulin syringes, disposable, (BD INSULIN SYRINGE) U-100 1 ML MISC 1 Device daily 30 each 2     lisinopril (ZESTRIL) 40 MG tablet TAKE 1 TABLET BY MOUTH DAILY 30 tablet 5     nystatin (MYCOSTATIN) 220502 UNIT/GM external powder Apply topically 3 times daily 60 g 3     ONETOUCH DELICA LANCETS 33G MISC 1 each 3 times daily 100 each 10     order for DME Women briefs 50 each 1     polyethylene glycol (MIRALAX) 17 GM/SCOOP powder Take 17 g (1 capful) by mouth daily 1 Bottle 3     primidone (MYSOLINE) 50 MG tablet TAKE 1 TABLET BY MOUTH AT BEDTIME 30 tablet 5     semaglutide (OZEMPIC, 1 MG/DOSE,) 2 MG/1.5ML pen Inject 1 mg Subcutaneous every 7 days 6 mL 3     triamcinolone (KENALOG) 0.1 % cream Apply topically 2 times daily       wearable insulin delivery device ([employer] 20) kit Insulin delivery device to be changed every 24 hours 30 each 4     No Known Allergies    Reviewed and updated as needed this visit by Provider         Review of Systems   As noted in the HPI.       Objective    /84   Pulse 86   Temp 98.7  F (37.1  C)   Ht 1.619 m (5' 3.75\")   Wt 88.5 kg (195 lb)   SpO2 94%   BMI 33.73 kg/m    Body mass index is 33.73 kg/m .  Physical Exam   GENERAL: obese, alert and no distress  RESP: lungs clear to auscultation - no rales, rhonchi or wheezes  CV: regular rate and rhythm, normal S1 S2, no S3 or S4, no murmur, click or rub, no peripheral edema and peripheral pulses strong  ABDOMEN: soft, nontender, no hepatosplenomegaly, no masses and bowel sounds normal  PSYCH: mentation appears normal, affect normal/bright  Rectal Exam: While patient was " laying on her right side, 2 external hemmroids were noted that the 9 o'clock position.     Diagnostic Test Results:  Labs reviewed in Epic  Results for orders placed or performed in visit on 05/21/20 (from the past 24 hour(s))   CBC with platelets and differential   Result Value Ref Range    WBC 9.7 4.0 - 11.0 10e9/L    RBC Count 5.36 (H) 3.8 - 5.2 10e12/L    Hemoglobin 16.1 (H) 11.7 - 15.7 g/dL    Hematocrit 48.1 (H) 35.0 - 47.0 %    MCV 90 78 - 100 fl    MCH 30.0 26.5 - 33.0 pg    MCHC 33.5 31.5 - 36.5 g/dL    RDW 13.0 10.0 - 15.0 %    Platelet Count 287 150 - 450 10e9/L    Diff Method Automated Method     % Neutrophils 57.5 %    % Lymphocytes 33.0 %    % Monocytes 7.0 %    % Eosinophils 1.4 %    % Basophils 0.8 %    % Immature Granulocytes 0.3 %    Nucleated RBCs 0 0 /100    Absolute Neutrophil 5.6 1.6 - 8.3 10e9/L    Absolute Lymphocytes 3.2 0.8 - 5.3 10e9/L    Absolute Monocytes 0.7 0.0 - 1.3 10e9/L    Absolute Eosinophils 0.1 0.0 - 0.7 10e9/L    Absolute Basophils 0.1 0.0 - 0.2 10e9/L    Abs Immature Granulocytes 0.0 0 - 0.4 10e9/L    Absolute Nucleated RBC 0.0            Assessment & Plan   (K59.00) Constipation, unspecified constipation type  (primary encounter diagnosis)  (K62.5) Rectal bleeding  (K64.4) External hemorrhoids  Plan: Hgb 16.1. Slightly elevated-most likely due to smoking history. As noted above, colonoscopy showed tubular adenoma in 2018. Recommended to repeat it in 5 years.  Patient notes that she still is having hard stools and 2 external hemorrhoids were noted. Rectal bleeding most likely from constipation/external hemorrhoids. Will try Preparation H and begin colace as needed. She was also encouraged to drink at least 8 glasses of water daily and increase her fruits and veggies. Follow up with telephone call in 3 weeks.         Amberly Whyte NP  [facility] [place]

## 2020-05-21 ENCOUNTER — OFFICE VISIT (OUTPATIENT)
Dept: FAMILY MEDICINE | Facility: OTHER | Age: 55
End: 2020-05-21
Attending: NURSE PRACTITIONER
Payer: MEDICARE

## 2020-05-21 VITALS
WEIGHT: 195 LBS | OXYGEN SATURATION: 94 % | DIASTOLIC BLOOD PRESSURE: 84 MMHG | HEART RATE: 86 BPM | HEIGHT: 64 IN | BODY MASS INDEX: 33.29 KG/M2 | SYSTOLIC BLOOD PRESSURE: 126 MMHG | TEMPERATURE: 98.7 F

## 2020-05-21 DIAGNOSIS — K62.5 RECTAL BLEEDING: ICD-10-CM

## 2020-05-21 DIAGNOSIS — K64.4 EXTERNAL HEMORRHOIDS: ICD-10-CM

## 2020-05-21 DIAGNOSIS — K59.00 CONSTIPATION, UNSPECIFIED CONSTIPATION TYPE: Primary | ICD-10-CM

## 2020-05-21 LAB
BASOPHILS # BLD AUTO: 0.1 10E9/L (ref 0–0.2)
BASOPHILS NFR BLD AUTO: 0.8 %
DIFFERENTIAL METHOD BLD: ABNORMAL
EOSINOPHIL # BLD AUTO: 0.1 10E9/L (ref 0–0.7)
EOSINOPHIL NFR BLD AUTO: 1.4 %
ERYTHROCYTE [DISTWIDTH] IN BLOOD BY AUTOMATED COUNT: 13 % (ref 10–15)
HCT VFR BLD AUTO: 48.1 % (ref 35–47)
HGB BLD-MCNC: 16.1 G/DL (ref 11.7–15.7)
IMM GRANULOCYTES # BLD: 0 10E9/L (ref 0–0.4)
IMM GRANULOCYTES NFR BLD: 0.3 %
LYMPHOCYTES # BLD AUTO: 3.2 10E9/L (ref 0.8–5.3)
LYMPHOCYTES NFR BLD AUTO: 33 %
MCH RBC QN AUTO: 30 PG (ref 26.5–33)
MCHC RBC AUTO-ENTMCNC: 33.5 G/DL (ref 31.5–36.5)
MCV RBC AUTO: 90 FL (ref 78–100)
MONOCYTES # BLD AUTO: 0.7 10E9/L (ref 0–1.3)
MONOCYTES NFR BLD AUTO: 7 %
NEUTROPHILS # BLD AUTO: 5.6 10E9/L (ref 1.6–8.3)
NEUTROPHILS NFR BLD AUTO: 57.5 %
NRBC # BLD AUTO: 0 10*3/UL
NRBC BLD AUTO-RTO: 0 /100
PLATELET # BLD AUTO: 287 10E9/L (ref 150–450)
RBC # BLD AUTO: 5.36 10E12/L (ref 3.8–5.2)
WBC # BLD AUTO: 9.7 10E9/L (ref 4–11)

## 2020-05-21 PROCEDURE — 99214 OFFICE O/P EST MOD 30 MIN: CPT | Performed by: NURSE PRACTITIONER

## 2020-05-21 PROCEDURE — 36415 COLL VENOUS BLD VENIPUNCTURE: CPT | Mod: ZL | Performed by: NURSE PRACTITIONER

## 2020-05-21 PROCEDURE — G0463 HOSPITAL OUTPT CLINIC VISIT: HCPCS

## 2020-05-21 PROCEDURE — 85025 COMPLETE CBC W/AUTO DIFF WBC: CPT | Mod: ZL | Performed by: NURSE PRACTITIONER

## 2020-05-21 RX ORDER — BENZOCAINE/MENTHOL 6 MG-10 MG
LOZENGE MUCOUS MEMBRANE 2 TIMES DAILY
Qty: 453.6 G | Refills: 0 | Status: SHIPPED | OUTPATIENT
Start: 2020-05-21 | End: 2024-03-08

## 2020-05-21 ASSESSMENT — PAIN SCALES - GENERAL: PAINLEVEL: NO PAIN (0)

## 2020-05-21 ASSESSMENT — MIFFLIN-ST. JEOR: SCORE: 1455.54

## 2020-05-21 NOTE — NURSING NOTE
"Chief Complaint   Patient presents with     Rectal Problem       Initial /84   Pulse 86   Temp 98.7  F (37.1  C)   Ht 1.619 m (5' 3.75\")   Wt 88.5 kg (195 lb)   SpO2 94%   BMI 33.73 kg/m   Estimated body mass index is 33.73 kg/m  as calculated from the following:    Height as of this encounter: 1.619 m (5' 3.75\").    Weight as of this encounter: 88.5 kg (195 lb).  Medication Reconciliation: complete  Dante Heart LPN  "

## 2020-06-08 ENCOUNTER — TELEPHONE (OUTPATIENT)
Dept: FAMILY MEDICINE | Facility: OTHER | Age: 55
End: 2020-06-08

## 2020-06-08 NOTE — TELEPHONE ENCOUNTER
Kate from [facility] [phone].  Requesting verbal orders for weekly skilled nursing 1x week for 8 weeks.

## 2020-06-11 ENCOUNTER — PATIENT OUTREACH (OUTPATIENT)
Dept: CARE COORDINATION | Facility: OTHER | Age: 55
End: 2020-06-11

## 2020-06-11 ENCOUNTER — VIRTUAL VISIT (OUTPATIENT)
Dept: EDUCATION SERVICES | Facility: OTHER | Age: 55
End: 2020-06-11
Attending: NURSE PRACTITIONER
Payer: COMMERCIAL

## 2020-06-11 DIAGNOSIS — Z79.4 TYPE 2 DIABETES MELLITUS WITH HYPERGLYCEMIA, WITH LONG-TERM CURRENT USE OF INSULIN (H): Primary | ICD-10-CM

## 2020-06-11 DIAGNOSIS — E11.65 TYPE 2 DIABETES MELLITUS WITH HYPERGLYCEMIA, WITH LONG-TERM CURRENT USE OF INSULIN (H): Primary | ICD-10-CM

## 2020-06-11 PROCEDURE — 99214 OFFICE O/P EST MOD 30 MIN: CPT | Mod: 95 | Performed by: NURSE PRACTITIONER

## 2020-06-11 ASSESSMENT — ACTIVITIES OF DAILY LIVING (ADL): DEPENDENT_IADLS:: INDEPENDENT

## 2020-06-11 ASSESSMENT — PAIN SCALES - GENERAL: PAINLEVEL: NO PAIN (0)

## 2020-06-11 NOTE — PROGRESS NOTES
"Marly Cannon is a 54 year old female who is being evaluated via a billable telephone visit.      The patient has been notified of following:     \"This telephone visit will be conducted via a call between you and your physician/provider. We have found that certain health care needs can be provided without the need for a physical exam.  This service lets us provide the care you need with a short phone conversation.  If a prescription is necessary we can send it directly to your pharmacy.  If lab work is needed we can place an order for that and you can then stop by our lab to have the test done at a later time.    Telephone visits are billed at different rates depending on your insurance coverage. During this emergency period, for some insurers they may be billed the same as an in-person visit.  Please reach out to your insurance provider with any questions.    If during the course of the call the physician/provider feels a telephone visit is not appropriate, you will not be charged for this service.\"    Patient has given verbal consent for Telephone visit?  Yes        How would you like to obtain your AVS? Mail a copy    Subjective     Malry Cannon is a 54 year old female who presents via phone visit today for the following health issues:    HPI  Diabetes Follow-up    How often are you checking your blood sugar? Three times daily  Blood sugar testing frequency justification:  Uncontrolled diabetes  What time of day are you checking your blood sugars (select all that apply)?  Before meals and At bedtime  Have you had any blood sugars above 200?  Yes   Have you had any blood sugars below 70?  No    What symptoms do you notice when your blood sugar is low? Not applicable    What concerns do you have today about your diabetes? Blood sugar is often over 200     Do you have any of these symptoms? (Select all that apply)  No numbness or tingling in feet.  No redness, sores or blisters on feet.  No complaints of " excessive thirst.  No reports of blurry vision.  No significant changes to weight.      BP Readings from Last 2 Encounters:   05/21/20 126/84   03/18/20 121/82     Hemoglobin A1C (%)   Date Value   05/04/2020 8.1 (H)   02/04/2020 Test canceled by physician     LDL Cholesterol Calculated (mg/dL)   Date Value   02/04/2020 49   03/29/2019 72                 How many servings of fruits and vegetables do you eat daily?  2-3    On average, how many sweetened beverages do you drink each day (Examples: soda, juice, sweet tea, etc.  Do NOT count diet or artificially sweetened beverages)?   1    How many days per week do you exercise enough to make your heart beat faster? When weather is nice    How many minutes a day do you exercise enough to make your heart beat faster? 30 - 60    How many days per week do you miss taking your medication? 0       The pt calls in BG reading's:  on 06/11/20 BG was 212 in the am  on 06/10/20 BG was  175 in the am, 188 at dinner time, and 137at HS  on 06/09/20 BG was 145 in the am,  136 at dinner time, and 196 at HS  on 06/07/20 BG was  156 in the am, 271 at dinner time, and 150 at HS  on 06/06/20 BG was 181 in the am, 210 at dinner time, and 121 at HS  on 06/05/20 BG was 167 in the am  on 06/8/20 BG was 175 in the am, , 162 at dinner time, and 216 at HS    Telephone visit with Marly regarding her Diabetes mellitus, Type 2.    Lab Results   Component Value Date    A1C 8.1 05/04/2020    A1C Test canceled by physician 02/04/2020    A1C 7.9 01/15/2020    A1C 9.8 10/30/2019    A1C 12.0 08/29/2019     Current Diabetes medication:   1. V-go 20--3 clicks with meals; 2 clicks with snacks  2. Ozempic 1 mg weekly (Tuesday)  Statin use: yes, simvastatin 20 mg daily  ASA: yes, 81 mg daily     Marly reports things are going well.   No issues with the v-go.    Started a new job     Denies any new health concerns.     Has an appt with Amberly Whyte NP in August for labs.      Patient Active Problem List    Diagnosis     Type 2 diabetes, HbA1c goal < 7% (H)     ACP (advance care planning)     Stage 3 chronic kidney disease (H)     Pulmonary emphysema (H)     Hyperparathyroidism due to renal insufficiency (H)     Morbid obesity (H)     Vitamin D deficiency     Intellectual disability     Breast fibrocystic disorder     Tobacco abuse     Microalbuminuria due to type 2 diabetes mellitus (H)     H/O colonoscopy     [place] cardiac risk <10% in next 10 years     Hypomagnesemia     Tubular adenoma of colon     Hyperlipidemia, unspecified hyperlipidemia type     Essential hypertension     Callus of foot     Past Surgical History:   Procedure Laterality Date     COLONOSCOPY N/A 8/20/2015    Procedure: COLONOSCOPY;  Surgeon: Gentry Porter MD;  Location: [facility]     COLONOSCOPY N/A 9/21/2018    Procedure: COLONOSCOPY;  COLONOSCOPY WITH POLYPECTOMY;  Surgeon: Gentry Porter MD;  Location: [facility]       Social History     Tobacco Use     Smoking status: Current Every Day Smoker     Packs/day: 1.00     Years: 34.00     Pack years: 34.00     Types: Cigarettes     Start date: 1/1/1979     Smokeless tobacco: Never Used     Tobacco comment: Declined QP 05/13/2020   Substance Use Topics     Alcohol use: No     Alcohol/week: 0.0 standard drinks     Family History   Problem Relation Age of Onset     Diabetes Mother      Diabetes Father      Hypertension Brother      Diabetes Sister          Current Outpatient Medications   Medication Sig Dispense Refill     Acetaminophen (TYLENOL PO) Take 325 mg by mouth every 6 hours as needed        amLODIPine (NORVASC) 5 MG tablet TAKE 1 TABLET BY MOUTH DAILY 90 tablet 1     ammonium lactate (LAC-HYDRIN) 12 % cream Apply topically 2 times daily       aspirin 81 MG EC tablet Take 1 tablet (81 mg) by mouth daily 90 tablet 3     atorvastatin (LIPITOR) 40 MG tablet Take 1 tablet (40 mg) by mouth daily 90 tablet 3     blood glucose (NO BRAND SPECIFIED) test strip Use to test blood sugar 4  times daily or as directed. 300 strip 11     budesonide-formoterol (SYMBICORT) 160-4.5 MCG/ACT Inhaler Inhale 2 puffs into the lungs 2 times daily 1 Inhaler 3     Cholecalciferol (VITAMIN D-3) 1000 units CAPS Take 2,000 Units by mouth daily 90 capsule 11     COMBIVENT RESPIMAT  MCG/ACT inhaler INHALE 1 PUFF INTO THE LUNGS 4 TIMES DAILY 4 g 0     docusate sodium (COLACE) 50 MG capsule Take 1 capsule (50 mg) by mouth 2 times daily as needed for constipation 60 capsule 1     hydrocortisone (CORTAID) 1 % external cream Apply topically 2 times daily 453.6 g 0     hydrocortisone 2.5 % cream Apply topically 2 times daily       insulin aspart (NOVOLOG VIAL) 100 UNITS/ML vial To be used with the V20.  TDD: 56 20 mL 6     insulin pen needle (32G X 4 MM) 32G X 4 MM miscellaneous Use 1 pen needles daily 100 each 3     insulin syringes, disposable, (BD INSULIN SYRINGE) U-100 1 ML MISC 1 Device daily 30 each 2     lisinopril (ZESTRIL) 40 MG tablet TAKE 1 TABLET BY MOUTH DAILY 30 tablet 5     nystatin (MYCOSTATIN) 861349 UNIT/GM external powder Apply topically 3 times daily 60 g 3     ONETOUCH DELICA LANCETS 33G MISC 1 each 3 times daily 100 each 10     order for DME Women briefs 50 each 1     polyethylene glycol (MIRALAX) 17 GM/SCOOP powder Take 17 g (1 capful) by mouth daily 1 Bottle 3     primidone (MYSOLINE) 50 MG tablet TAKE 1 TABLET BY MOUTH AT BEDTIME 30 tablet 5     semaglutide (OZEMPIC, 1 MG/DOSE,) 2 MG/1.5ML pen Inject 1 mg Subcutaneous every 7 days 6 mL 3     triamcinolone (KENALOG) 0.1 % cream Apply topically 2 times daily       wearable insulin delivery device ([employer] 20) kit Insulin delivery device to be changed every 24 hours 30 each 4     Cyclobenzaprine HCl (FLEXERIL PO) Take 10 mg by mouth 3 times daily as needed for muscle spasms       No Known Allergies  Recent Labs   Lab Test 05/04/20  1152 02/04/20  1204 01/15/20 12/04/19  1327  04/11/19  1340 03/29/19 02/22/19 08/21/18   A1C 8.1* Test canceled by  physician 7.9*  --    < >  --   --  9.8*   < > 9.6*  9.6*   LDL  --  49  --   --   --   --  72 86  --   --    HDL  --  42*  --   --   --   --  36* 36*  --   --    TRIG  --  141  --   --   --   --  181 159  --   --    ALT  --   --   --  15  --   --  10  --   --  9   CR 1.07* 1.23*  --  1.18*   < > 1.21*  --  1.68*   < > 1.36*   GFRESTIMATED 58* 49*  --  51*   < > 50*  --  32*   < > 40*   GFRESTBLACK 67 57*  --  60*   < > 58*  --   --    < >  --    POTASSIUM 3.6 4.1  --  4.1   < > 3.7  --  3.7   < > 4.1   TSH  --   --   --  0.97  --  1.16  --   --   --   --     < > = values in this interval not displayed.      BP Readings from Last 3 Encounters:   05/21/20 126/84   03/18/20 121/82   02/04/20 118/70    Wt Readings from Last 3 Encounters:   05/21/20 88.5 kg (195 lb)   03/18/20 89.6 kg (197 lb 8 oz)   02/04/20 89.8 kg (198 lb)                    Reviewed and updated as needed this visit by Provider  Tobacco  Allergies  Meds  Problems  Med Hx  Surg Hx  Fam Hx         Review of Systems   Skin: negative  Eyes: negative   Ears/Nose/Throat: glasses; negative  Respiratory: No shortness of breath or RUBY, no hemoptysis; occasional dry cough   Cardiovascular: negative  Gastrointestinal: negative  Genitourinary: negative  Musculoskeletal: negative  Neurologic: negative  Psychiatric: negative  Hematologic/Lymphatic/Immunologic: continued  Endocrine: positive for diabetes        Objective   Reported vitals:  There were no vitals taken for this visit.   alert and no distress  PSYCH: Alert and oriented times 3; coherent speech, normal   rate and volume, able to articulate logical thoughts, able   to abstract reason, no tangential thoughts, no hallucinations   or delusions  Her affect is normal  RESP: No cough, no audible wheezing, able to talk in full sentences  Remainder of exam unable to be completed due to telephone visits    Diagnostic Test Results:  Labs reviewed in Epic        Assessment/Plan:  (E11.65,  Z79.4) Type 2  diabetes mellitus with hyperglycemia, with long-term current use of insulin (H)  (primary encounter diagnosis)  Comment: noted BGs  Plan:   Will see what her A1c is like in August  We could do a CGM study prior to this, but will wait to get the whole picture     She's aware to call if BGs get higher.      Patient Instructions   Continue working on healthy eating and moving (start low and slow, work up to 30 min, 5x/week)    BG goals:  Fasting and before meals <130, >70  2 hour after eating <180    We only need 1/2 of these numbers to be within target then your A1c will be within target    Medication changes   None for now    Follow up   Amberly Whyte NP in August    Call me sooner if any problems/concerns and/or questions develop including consistent low BGs <70 or consistent high BGs >200  [phone] (Unit Coordinator)    [phone] (Nurse)            Return in about 2 months (around 8/11/2020).      Phone call duration:  22 minutes    BLAIR Browning CNP  June 11, 2020  2:08 PM

## 2020-06-11 NOTE — PROGRESS NOTES
"Marly Cannon is a 54 year old female who is being evaluated via a billable telephone visit.      The patient has been notified of following:     \"This telephone visit will be conducted via a call between you and your physician/provider. We have found that certain health care needs can be provided without the need for a physical exam.  This service lets us provide the care you need with a short phone conversation.  If a prescription is necessary we can send it directly to your pharmacy.  If lab work is needed we can place an order for that and you can then stop by our lab to have the test done at a later time.    Telephone visits are billed at different rates depending on your insurance coverage. During this emergency period, for some insurers they may be billed the same as an in-person visit.  Please reach out to your insurance provider with any questions.    If during the course of the call the physician/provider feels a telephone visit is not appropriate, you will not be charged for this service.\"    Patient has given verbal consent for Telephone visit?  Yes    What phone number would you like to be contacted at? [phone]    How would you like to obtain your AVS? Mail a copy    Subjective     Marly Cannon is a 54 year old female who presents via phone visit today for the following health issues:    HPI  Constipation    Patient was seen on 5/21/20 with rectal bleeding. See note. Two external hemorrhoids were noted. No anemia was noted. Preparation H and Colace were recommended. She was also encouraged to increase her water intake and consume more fruits and veggies.      Duration: on and off    Description:       Frequency of bowel movements: every other day, stools are soft       Consistency of stool: soft    Intensity:  moderate    Accompanying signs and symptoms:        Abdominal pain: no        Rectal pain: no        Blood in stool: no        Nausea/vomitting: no     History:        Similar problems " in past: YES    Precipitating or alleviating factors: she has also tried increasing her water intake and fruit and veggie consumption       Medications worsening symptoms: no     Therapies tried and outcome: miralax       Chronic laxative use: no     Colonoscopy showed a tubular adenoma in 2018. Recommended to repeat it in 5 years.       Patient Active Problem List   Diagnosis     Type 2 diabetes, HbA1c goal < 7% (H)     ACP (advance care planning)     Stage 3 chronic kidney disease (H)     Pulmonary emphysema (H)     Hyperparathyroidism due to renal insufficiency (H)     Morbid obesity (H)     Vitamin D deficiency     Intellectual disability     Breast fibrocystic disorder     Tobacco abuse     Microalbuminuria due to type 2 diabetes mellitus (H)     H/O colonoscopy     [place] cardiac risk <10% in next 10 years     Hypomagnesemia     Tubular adenoma of colon     Hyperlipidemia, unspecified hyperlipidemia type     Essential hypertension     Callus of foot     Past Surgical History:   Procedure Laterality Date     COLONOSCOPY N/A 8/20/2015    Procedure: COLONOSCOPY;  Surgeon: Gentry Porter MD;  Location: [facility]     COLONOSCOPY N/A 9/21/2018    Procedure: COLONOSCOPY;  COLONOSCOPY WITH POLYPECTOMY;  Surgeon: Gentry Porter MD;  Location: [facility]       Social History     Tobacco Use     Smoking status: Current Every Day Smoker     Packs/day: 1.00     Years: 34.00     Pack years: 34.00     Types: Cigarettes     Start date: 1/1/1979     Smokeless tobacco: Never Used     Tobacco comment: Declined QP 05/13/2020   Substance Use Topics     Alcohol use: No     Alcohol/week: 0.0 standard drinks     Family History   Problem Relation Age of Onset     Diabetes Mother      Diabetes Father      Hypertension Brother      Diabetes Sister          Current Outpatient Medications   Medication Sig Dispense Refill     Acetaminophen (TYLENOL PO) Take 325 mg by mouth every 6 hours as needed        amLODIPine (NORVASC) 5 MG  tablet TAKE 1 TABLET BY MOUTH DAILY 90 tablet 1     ammonium lactate (LAC-HYDRIN) 12 % cream Apply topically 2 times daily       aspirin 81 MG EC tablet Take 1 tablet (81 mg) by mouth daily 90 tablet 3     atorvastatin (LIPITOR) 40 MG tablet Take 1 tablet (40 mg) by mouth daily 90 tablet 3     blood glucose (NO BRAND SPECIFIED) test strip Use to test blood sugar 4 times daily or as directed. 300 strip 11     budesonide-formoterol (SYMBICORT) 160-4.5 MCG/ACT Inhaler Inhale 2 puffs into the lungs 2 times daily 1 Inhaler 3     Cholecalciferol (VITAMIN D-3) 1000 units CAPS Take 2,000 Units by mouth daily 90 capsule 11     COMBIVENT RESPIMAT  MCG/ACT inhaler INHALE 1 PUFF INTO THE LUNGS 4 TIMES DAILY 4 g 0     Cyclobenzaprine HCl (FLEXERIL PO) Take 10 mg by mouth 3 times daily as needed for muscle spasms       docusate sodium (COLACE) 50 MG capsule Take 1 capsule (50 mg) by mouth 2 times daily as needed for constipation 60 capsule 1     hydrocortisone (CORTAID) 1 % external cream Apply topically 2 times daily 453.6 g 0     hydrocortisone 2.5 % cream Apply topically 2 times daily       insulin aspart (NOVOLOG VIAL) 100 UNITS/ML vial To be used with the V20.  TDD: 56 20 mL 6     insulin pen needle (32G X 4 MM) 32G X 4 MM miscellaneous Use 1 pen needles daily 100 each 3     insulin syringes, disposable, (BD INSULIN SYRINGE) U-100 1 ML MISC 1 Device daily 30 each 2     lisinopril (ZESTRIL) 40 MG tablet TAKE 1 TABLET BY MOUTH DAILY 30 tablet 5     nystatin (MYCOSTATIN) 589488 UNIT/GM external powder Apply topically 3 times daily 60 g 3     ONETOUCH DELICA LANCETS 33G MISC 1 each 3 times daily 100 each 10     order for DME Women briefs 50 each 1     polyethylene glycol (MIRALAX) 17 GM/SCOOP powder Take 17 g (1 capful) by mouth daily 1 Bottle 3     primidone (MYSOLINE) 50 MG tablet TAKE 1 TABLET BY MOUTH AT BEDTIME 30 tablet 5     semaglutide (OZEMPIC, 1 MG/DOSE,) 2 MG/1.5ML pen Inject 1 mg Subcutaneous every 7 days 6 mL  3     triamcinolone (KENALOG) 0.1 % cream Apply topically 2 times daily       wearable insulin delivery device (V-GO 20) kit Insulin delivery device to be changed every 24 hours 30 each 4     No Known Allergies    Reviewed and updated as needed this visit by Provider         Review of Systems   As noted in the HPI.        Objective   Reported vitals:  There were no vitals taken for this visit.   healthy, alert and no distress  PSYCH: Alert and oriented times 3; coherent speech, normal   rate and volume, able to articulate logical thoughts, able   to abstract reason, no tangential thoughts, no hallucinations   or delusions  Her affect is normal  RESP: No cough, no audible wheezing, able to talk in full sentences  Remainder of exam unable to be completed due to telephone visits    Diagnostic Test Results:  none         Assessment/Plan:  1. Constipation, unspecified constipation type  2. Rectal bleeding  3. External hemorrhoids  Patient doing better. Stools are now soft. Denies any further rectal bleeding. Will continue to use the MiraLax daily and titrate her stools until she has a soft stool daily. Colonoscopy due in 2023, unless new symptoms arise. She agrees to let me know right away if she has any rectal bleeding.       No follow-ups on file.      Phone call duration:  5 minutes    Amberly Whyte NP

## 2020-06-11 NOTE — PROGRESS NOTES
Clinic Care Coordination Contact  Care Team Conversations    CC received an update from Courtney Chaudhary NP today following her telephone visit with pt.  Pt is doing well.  Back on the V-Go.  She is working part time.  Significant other did lose his job but is on unemployment currently.    Pt to follow up with Amberly Whyte NP on 8/7/20.  Courtney would like her to have a nurse visit for meter download follow-up that date.  Nurse appt scheduled.      Lauren Pipkin, BS-RN   CHF and General Care Coordinator  [phone] Option # 1

## 2020-06-11 NOTE — PATIENT INSTRUCTIONS
Continue working on healthy eating and moving (start low and slow, work up to 30 min, 5x/week)    BG goals:  Fasting and before meals <130, >70  2 hour after eating <180    We only need 1/2 of these numbers to be within target then your A1c will be within target    Medication changes   None for now    Follow up   Amberly Whyte NP in August    Call me sooner if any problems/concerns and/or questions develop including consistent low BGs <70 or consistent high BGs >200  [phone] (Unit Coordinator)    [phone] (Nurse)

## 2020-06-12 ENCOUNTER — VIRTUAL VISIT (OUTPATIENT)
Dept: FAMILY MEDICINE | Facility: OTHER | Age: 55
End: 2020-06-12
Attending: NURSE PRACTITIONER
Payer: COMMERCIAL

## 2020-06-12 DIAGNOSIS — K59.00 CONSTIPATION, UNSPECIFIED CONSTIPATION TYPE: Primary | ICD-10-CM

## 2020-06-12 DIAGNOSIS — K64.4 EXTERNAL HEMORRHOIDS: ICD-10-CM

## 2020-06-12 DIAGNOSIS — K62.5 RECTAL BLEEDING: ICD-10-CM

## 2020-06-12 PROCEDURE — 99212 OFFICE O/P EST SF 10 MIN: CPT | Mod: 95 | Performed by: NURSE PRACTITIONER

## 2020-06-12 NOTE — NURSING NOTE
"Chief Complaint   Patient presents with     Constipation       Initial There were no vitals taken for this visit. Estimated body mass index is 33.73 kg/m  as calculated from the following:    Height as of 5/21/20: 1.619 m (5' 3.75\").    Weight as of 5/21/20: 88.5 kg (195 lb).  Medication Reconciliation: complete  Minoo Meyer LPN    "

## 2020-06-16 DIAGNOSIS — K59.00 CONSTIPATION, UNSPECIFIED CONSTIPATION TYPE: ICD-10-CM

## 2020-06-16 DIAGNOSIS — I10 ESSENTIAL HYPERTENSION: ICD-10-CM

## 2020-06-16 DIAGNOSIS — E78.5 HYPERLIPIDEMIA, UNSPECIFIED HYPERLIPIDEMIA TYPE: ICD-10-CM

## 2020-06-17 RX ORDER — AMLODIPINE BESYLATE 5 MG/1
TABLET ORAL
Qty: 90 TABLET | Refills: 0 | Status: SHIPPED | OUTPATIENT
Start: 2020-06-17 | End: 2020-08-05

## 2020-06-17 RX ORDER — ATORVASTATIN CALCIUM 40 MG/1
TABLET, FILM COATED ORAL
Qty: 31 TABLET | Refills: 0 | Status: SHIPPED | OUTPATIENT
Start: 2020-06-17 | End: 2020-07-22

## 2020-06-17 RX ORDER — ASPIRIN 81 MG
TABLET,CHEWABLE ORAL
Qty: 90 TABLET | Refills: 0 | Status: SHIPPED | OUTPATIENT
Start: 2020-06-17 | End: 2020-08-07

## 2020-06-17 NOTE — TELEPHONE ENCOUNTER
AMLODIPINE BESYLATE 5 MG TAB     Calcium Channel Blockers Protocol Failed    Normal serum creatinine on file in past 12 months    Creatinine   Date Value Ref Range Status   05/04/2020 1.07 (H) 0.52 - 1.04 mg/dL Final

## 2020-06-18 RX ORDER — DOCUSATE SODIUM 100 MG/1
100 CAPSULE, LIQUID FILLED ORAL DAILY
Qty: 90 CAPSULE | Refills: 3 | OUTPATIENT
Start: 2020-06-18

## 2020-06-22 DIAGNOSIS — K59.00 CONSTIPATION, UNSPECIFIED CONSTIPATION TYPE: Primary | ICD-10-CM

## 2020-06-22 NOTE — TELEPHONE ENCOUNTER
Fax received from the pharmacy Colace 50mg is not covered by the insurance company, but they will cover the 100mg. Can this be changed to Colace 100mg 1 cap daily?

## 2020-06-23 RX ORDER — DOCUSATE SODIUM 100 MG/1
100 CAPSULE, LIQUID FILLED ORAL DAILY
Qty: 90 CAPSULE | Refills: 1 | Status: SHIPPED | OUTPATIENT
Start: 2020-06-23 | End: 2021-02-16

## 2020-06-24 DIAGNOSIS — J43.9 PULMONARY EMPHYSEMA, UNSPECIFIED EMPHYSEMA TYPE (H): ICD-10-CM

## 2020-06-24 RX ORDER — IPRATROPIUM BROMIDE AND ALBUTEROL 20; 100 UG/1; UG/1
SPRAY, METERED RESPIRATORY (INHALATION)
Qty: 4 G | Refills: 0 | Status: SHIPPED | OUTPATIENT
Start: 2020-06-24 | End: 2020-10-12

## 2020-06-24 NOTE — TELEPHONE ENCOUNTER
Combivent last filled on 2.26.2020 #4g. Last office visit on 6.12.2020.    Pended.    Sruthi Casanova RN

## 2020-06-30 ENCOUNTER — ANCILLARY PROCEDURE (OUTPATIENT)
Dept: MAMMOGRAPHY | Facility: OTHER | Age: 55
End: 2020-06-30
Attending: NURSE PRACTITIONER
Payer: COMMERCIAL

## 2020-06-30 DIAGNOSIS — Z12.31 VISIT FOR SCREENING MAMMOGRAM: ICD-10-CM

## 2020-06-30 PROCEDURE — 77067 SCR MAMMO BI INCL CAD: CPT | Mod: TC

## 2020-07-20 DIAGNOSIS — E78.5 HYPERLIPIDEMIA, UNSPECIFIED HYPERLIPIDEMIA TYPE: ICD-10-CM

## 2020-07-20 DIAGNOSIS — E55.9 VITAMIN D DEFICIENCY: Primary | ICD-10-CM

## 2020-07-21 ENCOUNTER — PATIENT OUTREACH (OUTPATIENT)
Dept: CARE COORDINATION | Facility: OTHER | Age: 55
End: 2020-07-21

## 2020-07-21 DIAGNOSIS — N18.30 STAGE 3 CHRONIC KIDNEY DISEASE (H): Primary | ICD-10-CM

## 2020-07-21 ASSESSMENT — ACTIVITIES OF DAILY LIVING (ADL): DEPENDENT_IADLS:: INDEPENDENT

## 2020-07-21 NOTE — PROGRESS NOTES
Clinic Care Coordination Contact  Care Team Conversations    CC notes pt has still not followed up with Dr Eason at [facility].  This has been an ongoing issue of trying to get her scheduled for the follow-up that is overdue.  Last appt was April 2018.  According to [facility] Pt Care Coordinator writer spoke with today, pt has no show'd on 3 different occasions with them.  At this time, pt must have a new consult since it has been 2 years since her last visit.  They are requesting we speak with EMILY Fernandez here in [place] to get pt scheduled for this.  It sounds like the appt may need to be a virtual/telephone visit due to COVID-19 restrictions.    CC left a message for Rebecca to call back.    Lauren Pipkin, BS-RN   Care Coordination  Primary Care- [facility]  [phone] Option # 1

## 2020-07-22 RX ORDER — ATORVASTATIN CALCIUM 40 MG/1
TABLET, FILM COATED ORAL
Qty: 31 TABLET | Refills: 0 | Status: SHIPPED | OUTPATIENT
Start: 2020-07-22 | End: 2020-08-05

## 2020-07-22 RX ORDER — ACETAMINOPHEN 160 MG
TABLET,DISINTEGRATING ORAL
Qty: 100 CAPSULE | Refills: 0 | Status: SHIPPED | OUTPATIENT
Start: 2020-07-22 | End: 2020-10-27

## 2020-07-22 NOTE — TELEPHONE ENCOUNTER
07/22/20 I will be working on Dr. Eason's schedule some time this week. I will get this patient schedule.

## 2020-07-23 NOTE — TELEPHONE ENCOUNTER
Per Lauren Pipkin this patient hasn't seen Dr. Eason since 2018. Patient needs a new referral and then I can get patient schedule please.    Thank you

## 2020-07-30 NOTE — TELEPHONE ENCOUNTER
Josie I am still waiting on a new referral for this patient to be scheduled with Dr. Eason. Or did this change?    Please advise    Thank you

## 2020-08-04 ENCOUNTER — TELEPHONE (OUTPATIENT)
Dept: FAMILY MEDICINE | Facility: OTHER | Age: 55
End: 2020-08-04

## 2020-08-04 NOTE — TELEPHONE ENCOUNTER
Call from Myesha with [facility] requesting VO to continue weekly SNV's.     Myesha can be reached at  [phone]

## 2020-08-05 NOTE — PROGRESS NOTES
Subjective     Marly Cannon is a 54 year old female who presents to clinic today for the following health issues:    HPI       Diabetes Follow-up    How often are you checking your blood sugar? Three times daily  Blood sugar testing frequency justification:  Patient modifying lifestyle changes (diet, exercise) with blood sugars  What time of day are you checking your blood sugars (select all that apply)?  Before meals  Have you had any blood sugars above 200?  No  Have you had any blood sugars below 70?  No    What symptoms do you notice when your blood sugar is low?  None    What concerns do you have today about your diabetes? None     Do you have any of these symptoms? (Select all that apply)  No numbness or tingling in feet.  No redness, sores or blisters on feet.  No complaints of excessive thirst.  No reports of blurry vision.  No significant changes to weight.   -Microalbuminuria present. On ACE.   -She does have chronic kidney disease. Avoids NSAIDs.   -Taking Ozempic once weekly. Do note, she has trouble giving herself the medication, but the home care nurse helps her. Also on sliding scale Novolog.   -A1C was 8.1 on 5/4/20.  -She denies chest pain, shortness of breath, dizziness, syncope, or palpitations.  -On asa.               Hyperlipidemia Follow-Up      Are you regularly taking any medication or supplement to lower your cholesterol?   Yes, atovastatin    Are you having muscle aches or other side effects that you think could be caused by your cholesterol lowering medication?  No     Also on asa.   - As noted above, she denies chest pain, shortness of breath, dizziness, syncope, or palpitations.    Hypertension Follow-up      Do you check your blood pressure regularly outside of the clinic? Yes has a nurse that comes in on Tuesdays, readings around 120/80     Are you following a low salt diet? No    Are your blood pressures ever more than 140 on the top number (systolic) OR more   than 90 on the  bottom number (diastolic), for example 140/90? No   -Taking amlodipine and lisinopril without side effects.      BP Readings from Last 2 Encounters:   08/07/20 122/80   05/21/20 126/84     Hemoglobin A1C (%)   Date Value   05/04/2020 8.1 (H)   02/04/2020 Test canceled by physician     LDL Cholesterol Calculated (mg/dL)   Date Value   02/04/2020 49   03/29/2019 72       COPD Follow-Up    Overall, how are your COPD symptoms since your last clinic visit?  Better    How much fatigue or shortness of breath do you have when you are walking?  On occasion     How much shortness of breath do you have when you are resting?  Same as usual    How often do you cough? Rarely    Have you noticed any change in your sputum/phlegm?  No    Have you experienced a recent fever? No    Please describe how far you can walk without stopping to rest:  Less than 1 block    How many flights of stairs are you able to walk up without stopping?  None    Have you had any Emergency Room Visits, Urgent Care Visits, or Hospital Admissions because of your COPD since your last office visit?  No     Using Combivent and Symbicort without side effects. Continues to smoke, smoking 1-2 ppd. No interest in quitting. No fevers. No blood in sputum.     History   Smoking Status     Current Every Day Smoker     Packs/day: 1.00     Years: 34.00     Types: Cigarettes     Start date: 1/1/1979   Smokeless Tobacco     Never Used     Comment: Declined QP 05/13/2020     No results found for: FEV1, QRT4HYI        Patient Active Problem List   Diagnosis     Type 2 diabetes, HbA1c goal < 7% (H)     ACP (advance care planning)     Stage 3 chronic kidney disease (H)     Pulmonary emphysema (H)     Hyperparathyroidism due to renal insufficiency (H)     Morbid obesity (H)     Vitamin D deficiency     Intellectual disability     Breast fibrocystic disorder     Tobacco abuse     Microalbuminuria due to type 2 diabetes mellitus (H)     H/O colonoscopy     [place] cardiac risk  <10% in next 10 years     Hypomagnesemia     Tubular adenoma of colon     Hyperlipidemia, unspecified hyperlipidemia type     Essential hypertension     Callus of foot     Memory disturbance     Past Surgical History:   Procedure Laterality Date     COLONOSCOPY N/A 8/20/2015    Procedure: COLONOSCOPY;  Surgeon: Gentry Porter MD;  Location: [facility]     COLONOSCOPY N/A 9/21/2018    Procedure: COLONOSCOPY;  COLONOSCOPY WITH POLYPECTOMY;  Surgeon: Gentry Porter MD;  Location: [facility]       Social History     Tobacco Use     Smoking status: Current Every Day Smoker     Packs/day: 1.00     Years: 34.00     Pack years: 34.00     Types: Cigarettes     Start date: 1/1/1979     Smokeless tobacco: Never Used     Tobacco comment: Declined QP 05/13/2020   Substance Use Topics     Alcohol use: No     Alcohol/week: 0.0 standard drinks     Family History   Problem Relation Age of Onset     Diabetes Mother      Diabetes Father      Hypertension Brother      Diabetes Sister          Current Outpatient Medications   Medication Sig Dispense Refill     Acetaminophen (TYLENOL PO) Take 325 mg by mouth every 6 hours as needed        amLODIPine (NORVASC) 5 MG tablet TAKE 1 TABLET BY MOUTH DAILY 90 tablet 0     ammonium lactate (LAC-HYDRIN) 12 % cream Apply topically 2 times daily       aspirin 81 MG EC tablet Take 1 tablet (81 mg) by mouth daily 90 tablet 3     atorvastatin (LIPITOR) 40 MG tablet TAKE 1 TABLET BY MOUTH AT BEDTIME 31 tablet 0     blood glucose (NO BRAND SPECIFIED) test strip Use to test blood sugar 4 times daily or as directed. 300 strip 11     budesonide-formoterol (SYMBICORT) 160-4.5 MCG/ACT Inhaler Inhale 2 puffs into the lungs 2 times daily 1 Inhaler 3     Cholecalciferol (VITAMIN D-3) 1000 units CAPS Take 2,000 Units by mouth daily 90 capsule 11     Cholecalciferol (VITAMIN D3) 50 MCG (2000 UT) CAPS TAKE 1 CAPSULE BY MOUTH DAILY 100 capsule 0     docusate sodium (COLACE) 100 MG capsule Take 1 capsule (100 mg)  "by mouth daily 90 capsule 1     hydrocortisone (CORTAID) 1 % external cream Apply topically 2 times daily 453.6 g 0     hydrocortisone 2.5 % cream Apply topically 2 times daily       insulin aspart (NOVOLOG VIAL) 100 UNITS/ML vial To be used with the V20.  TDD: 56 20 mL 6     insulin pen needle (32G X 4 MM) 32G X 4 MM miscellaneous Use 1 pen needles daily 100 each 3     insulin syringes, disposable, (BD INSULIN SYRINGE) U-100 1 ML MISC 1 Device daily 30 each 2     ipratropium-albuterol (COMBIVENT RESPIMAT)  MCG/ACT inhaler INHALE 1 PUFF INTO THE LUNGS 4 TIMES DAILY 4 g 0     lisinopril (ZESTRIL) 40 MG tablet TAKE 1 TABLET BY MOUTH DAILY 30 tablet 5     nystatin (MYCOSTATIN) 811907 UNIT/GM external powder Apply topically 3 times daily 60 g 3     ONETOUCH DELICA LANCETS 33G MISC 1 each 3 times daily 100 each 10     order for DME Women briefs 50 each 1     polyethylene glycol (MIRALAX) 17 GM/SCOOP powder Take 17 g (1 capful) by mouth daily 1 Bottle 3     primidone (MYSOLINE) 50 MG tablet TAKE 1 TABLET BY MOUTH AT BEDTIME 30 tablet 5     semaglutide (OZEMPIC, 1 MG/DOSE,) 2 MG/1.5ML pen Inject 1 mg Subcutaneous every 7 days 6 mL 3     triamcinolone (KENALOG) 0.1 % cream Apply topically 2 times daily       wearable insulin delivery device (V-GO 20) kit Insulin delivery device to be changed every 24 hours 30 each 4     No Known Allergies    Reviewed and updated as needed this visit by Provider         Review of Systems   As noted in the HPI.       Objective    /80 (BP Location: Left arm, Patient Position: Chair, Cuff Size: Adult Large)   Pulse 67   Temp 98  F (36.7  C) (Tympanic)   Ht 1.619 m (5' 3.75\")   Wt 90.7 kg (200 lb)   SpO2 92%   BMI 34.60 kg/m    Body mass index is 34.6 kg/m .  Physical Exam   Exam:  Constitutional: alert, no distress and over weight  Head: Normocephalic. No masses, lesions, tenderness or abnormalities  Neck: Neck supple. No adenopathy. Thyroid symmetric, normal size,, Carotids " without bruits.  ENT: ENT exam normal, no neck nodes or sinus tenderness  Cardiovascular: negative, PMI normal. No lifts, heaves, or thrills. RRR. No murmurs, clicks gallops or rub  Respiratory: negative,. Good diaphragmatic excursion. Lungs clear  Neurologic: Gait normal. Reflexes normal and symmetric. Sensation grossly WNL.  Psychiatric: mentation appears normal and affect normal/bright        Diagnostic Test Results:  pending        Assessment & Plan     1. Essential hypertension  Well controlled. Continue current medications. Encouraged daily exercise and a low sodium diet. Recommended checking BP's 2x/wk, call the clinic if consistantly s>140 or d>90. Follow up in 6 months.     - amLODIPine (NORVASC) 5 MG tablet; Take 1 tablet (5 mg) by mouth daily  Dispense: 90 tablet; Refill: 3  - aspirin (ASPIRIN LOW DOSE) 81 MG chewable tablet; CHEW 1 TABLET BY MOUTH DAILY. DO NOT SPLIT OR CRUSH  Dispense: 90 tablet; Refill: 3  - lisinopril (ZESTRIL) 40 MG tablet; Take 1 tablet (40 mg) by mouth daily  Dispense: 30 tablet; Refill: 5    2. Hyperlipidemia, unspecified hyperlipidemia type  Tolerating statin. Will continue. AST/ALT pending. Will notify patient of the results when available and intervene accordingly.     3. Pulmonary emphysema, unspecified emphysema type (H)  Controlled. Continue current inhalers.     4. Microalbuminuria due to type 2 diabetes mellitus (H)  On ACE. Will continue.     5. Chronic kidney disease, stage 3 (moderate) (H)  Recheck kidney function. Will notify patient of the results when available and intervene accordingly. Will avoid NSAIDs and limit her sodium intake.     6. Type 2 diabetes, HbA1c goal < 7% (H)  A1C pending. Will notify patient of the results when available and intervene accordingly. If high, will defer back to the [facility]. On statin. On asa. BP at goal. Eye exam UTD. Follow up 3 months.     7. Morbid obesity (H)  BMI 34.6. Diet and exercise encouraged.     8. Tobacco  "abuse  Cessation encouraged.        BMI:   Estimated body mass index is 34.6 kg/m  as calculated from the following:    Height as of this encounter: 1.619 m (5' 3.75\").    Weight as of this encounter: 90.7 kg (200 lb).   Weight management plan: Discussed healthy diet and exercise guidelines      Amberly Whyte NP  [facility] [place]    "

## 2020-08-06 PROBLEM — R41.3 MEMORY DISTURBANCE: Status: ACTIVE | Noted: 2020-02-13

## 2020-08-07 ENCOUNTER — ALLIED HEALTH/NURSE VISIT (OUTPATIENT)
Dept: EDUCATION SERVICES | Facility: OTHER | Age: 55
End: 2020-08-07
Payer: COMMERCIAL

## 2020-08-07 ENCOUNTER — OFFICE VISIT (OUTPATIENT)
Dept: FAMILY MEDICINE | Facility: OTHER | Age: 55
End: 2020-08-07
Attending: NURSE PRACTITIONER
Payer: COMMERCIAL

## 2020-08-07 VITALS
SYSTOLIC BLOOD PRESSURE: 122 MMHG | WEIGHT: 200 LBS | DIASTOLIC BLOOD PRESSURE: 80 MMHG | HEIGHT: 64 IN | TEMPERATURE: 98 F | OXYGEN SATURATION: 92 % | BODY MASS INDEX: 34.15 KG/M2 | HEART RATE: 67 BPM

## 2020-08-07 DIAGNOSIS — E11.29 MICROALBUMINURIA DUE TO TYPE 2 DIABETES MELLITUS (H): ICD-10-CM

## 2020-08-07 DIAGNOSIS — E11.9 TYPE 2 DIABETES, HBA1C GOAL < 7% (H): ICD-10-CM

## 2020-08-07 DIAGNOSIS — E78.5 HYPERLIPIDEMIA, UNSPECIFIED HYPERLIPIDEMIA TYPE: ICD-10-CM

## 2020-08-07 DIAGNOSIS — J43.9 PULMONARY EMPHYSEMA, UNSPECIFIED EMPHYSEMA TYPE (H): Primary | ICD-10-CM

## 2020-08-07 DIAGNOSIS — Z72.0 TOBACCO ABUSE: ICD-10-CM

## 2020-08-07 DIAGNOSIS — E11.65 TYPE 2 DIABETES MELLITUS WITH HYPERGLYCEMIA, WITH LONG-TERM CURRENT USE OF INSULIN (H): Primary | ICD-10-CM

## 2020-08-07 DIAGNOSIS — N18.30 CHRONIC KIDNEY DISEASE, STAGE 3 (MODERATE): ICD-10-CM

## 2020-08-07 DIAGNOSIS — R80.9 MICROALBUMINURIA DUE TO TYPE 2 DIABETES MELLITUS (H): ICD-10-CM

## 2020-08-07 DIAGNOSIS — E66.01 MORBID OBESITY (H): ICD-10-CM

## 2020-08-07 DIAGNOSIS — I10 ESSENTIAL HYPERTENSION: ICD-10-CM

## 2020-08-07 DIAGNOSIS — Z79.4 TYPE 2 DIABETES MELLITUS WITH HYPERGLYCEMIA, WITH LONG-TERM CURRENT USE OF INSULIN (H): Primary | ICD-10-CM

## 2020-08-07 LAB
ALBUMIN SERPL-MCNC: 3.7 G/DL (ref 3.4–5)
ALP SERPL-CCNC: 94 U/L (ref 40–150)
ALT SERPL W P-5'-P-CCNC: 16 U/L (ref 0–50)
ANION GAP SERPL CALCULATED.3IONS-SCNC: 4 MMOL/L (ref 3–14)
AST SERPL W P-5'-P-CCNC: 8 U/L (ref 0–45)
BILIRUB SERPL-MCNC: 0.5 MG/DL (ref 0.2–1.3)
BUN SERPL-MCNC: 20 MG/DL (ref 7–30)
CALCIUM SERPL-MCNC: 9.7 MG/DL (ref 8.5–10.1)
CHLORIDE SERPL-SCNC: 104 MMOL/L (ref 94–109)
CO2 SERPL-SCNC: 31 MMOL/L (ref 20–32)
CREAT SERPL-MCNC: 1.16 MG/DL (ref 0.52–1.04)
CREAT UR-MCNC: 287 MG/DL
EST. AVERAGE GLUCOSE BLD GHB EST-MCNC: 180 MG/DL
GFR SERPL CREATININE-BSD FRML MDRD: 52 ML/MIN/{1.73_M2}
GLUCOSE SERPL-MCNC: 155 MG/DL (ref 70–99)
HBA1C MFR BLD: 7.9 % (ref 0–5.6)
MICROALBUMIN UR-MCNC: 600 MG/L
MICROALBUMIN/CREAT UR: 209.06 MG/G CR (ref 0–25)
POTASSIUM SERPL-SCNC: 3.8 MMOL/L (ref 3.4–5.3)
PROT SERPL-MCNC: 8.3 G/DL (ref 6.8–8.8)
SODIUM SERPL-SCNC: 139 MMOL/L (ref 133–144)

## 2020-08-07 PROCEDURE — 82043 UR ALBUMIN QUANTITATIVE: CPT | Mod: ZL | Performed by: NURSE PRACTITIONER

## 2020-08-07 PROCEDURE — 99214 OFFICE O/P EST MOD 30 MIN: CPT | Performed by: NURSE PRACTITIONER

## 2020-08-07 PROCEDURE — 80053 COMPREHEN METABOLIC PANEL: CPT | Mod: ZL | Performed by: NURSE PRACTITIONER

## 2020-08-07 PROCEDURE — G0463 HOSPITAL OUTPT CLINIC VISIT: HCPCS

## 2020-08-07 PROCEDURE — 36415 COLL VENOUS BLD VENIPUNCTURE: CPT | Mod: ZL | Performed by: NURSE PRACTITIONER

## 2020-08-07 PROCEDURE — 83036 HEMOGLOBIN GLYCOSYLATED A1C: CPT | Mod: ZL | Performed by: NURSE PRACTITIONER

## 2020-08-07 PROCEDURE — 40000788 ZZHCL STATISTIC ESTIMATED AVERAGE GLUCOSE: Mod: ZL | Performed by: NURSE PRACTITIONER

## 2020-08-07 RX ORDER — AMLODIPINE BESYLATE 5 MG/1
5 TABLET ORAL DAILY
Qty: 90 TABLET | Refills: 3 | Status: SHIPPED | OUTPATIENT
Start: 2020-08-07 | End: 2021-09-01

## 2020-08-07 RX ORDER — ATORVASTATIN CALCIUM 40 MG/1
40 TABLET, FILM COATED ORAL AT BEDTIME
Qty: 90 TABLET | Refills: 3 | Status: SHIPPED | OUTPATIENT
Start: 2020-08-07 | End: 2021-08-10

## 2020-08-07 RX ORDER — ASPIRIN 81 MG/1
TABLET, CHEWABLE ORAL
Qty: 90 TABLET | Refills: 3 | Status: SHIPPED | OUTPATIENT
Start: 2020-08-07 | End: 2021-09-08

## 2020-08-07 RX ORDER — LISINOPRIL 40 MG/1
40 TABLET ORAL DAILY
Qty: 30 TABLET | Refills: 5 | Status: SHIPPED | OUTPATIENT
Start: 2020-08-07 | End: 2021-02-03

## 2020-08-07 ASSESSMENT — MIFFLIN-ST. JEOR: SCORE: 1473.22

## 2020-08-07 ASSESSMENT — PAIN SCALES - GENERAL: PAINLEVEL: NO PAIN (0)

## 2020-08-07 NOTE — NURSING NOTE
"Chief Complaint   Patient presents with     Diabetes     Hypertension     Lipids       Initial /80 (BP Location: Left arm, Patient Position: Chair, Cuff Size: Adult Large)   Pulse 67   Temp 98  F (36.7  C) (Tympanic)   Ht 1.619 m (5' 3.75\")   Wt 90.7 kg (200 lb)   SpO2 92%   BMI 34.60 kg/m   Estimated body mass index is 34.6 kg/m  as calculated from the following:    Height as of this encounter: 1.619 m (5' 3.75\").    Weight as of this encounter: 90.7 kg (200 lb).  Medication Reconciliation: complete  Karolina Camacho LPN  "

## 2020-08-07 NOTE — PROGRESS NOTES
Pt came in for a BG monitor download today. This was completed and given to Courtney Chaudhary.    Gege Walter

## 2020-08-17 NOTE — PROGRESS NOTES
Noted the following    Highest: 317  Lowest: 111  Period average: 174    Last A1c  Lab Results   Component Value Date    A1C 7.9 08/07/2020    A1C 8.1 05/04/2020    A1C Test canceled by physician 02/04/2020    A1C 7.9 01/15/2020    A1C 9.8 10/30/2019       No change at this time    Follow up as discussed.     Courtney Chaudhary APRN FNP-BC  Diabetes and Wound Care

## 2020-08-20 ENCOUNTER — PATIENT OUTREACH (OUTPATIENT)
Dept: CARE COORDINATION | Facility: OTHER | Age: 55
End: 2020-08-20

## 2020-08-20 ASSESSMENT — ACTIVITIES OF DAILY LIVING (ADL): DEPENDENT_IADLS:: INDEPENDENT

## 2020-08-20 NOTE — PROGRESS NOTES
Clinic Care Coordination Contact    Situation: Patient chart reviewed by care coordinator.    Background: CC reviewed DRC visit note from 8/7/20 as well as PCP Amberly Whyte NP note from the same date.    Assessment: Pt Hgb A1C is 7.9  Things are improved with her home diabetes management.  V-Go has been beneficial for her.      EMILY Fernandez  was also working to get her set up with Dr Eason.  [facility]    Plan/Recommendations:   Future appts as follows:  Next 5 appointments (look out 90 days)    Nov 09, 2020  9:40 AM CST  (Arrive by 9:25 AM)  SHORT with Amberly Whyte NP  [facility] [address] [facility] [facility] [address]  [phone]        Courtney Chaudhary NP BARI 9/8/20 1PM    CC continues to monitor.  Pt continues to receive home skilled nursing services- Laura with [facility].    Lauren Pipkin, BS-RN   Care Coordination  Primary Care- [facility]  [phone] Option # 1

## 2020-08-24 DIAGNOSIS — Z79.4 TYPE 2 DIABETES MELLITUS WITH HYPERGLYCEMIA, WITH LONG-TERM CURRENT USE OF INSULIN (H): ICD-10-CM

## 2020-08-24 DIAGNOSIS — E11.65 TYPE 2 DIABETES MELLITUS WITH HYPERGLYCEMIA, WITH LONG-TERM CURRENT USE OF INSULIN (H): ICD-10-CM

## 2020-08-25 RX ORDER — BLOOD SUGAR DIAGNOSTIC
STRIP MISCELLANEOUS
Qty: 100 STRIP | Refills: 1 | Status: SHIPPED | OUTPATIENT
Start: 2020-08-25 | End: 2020-12-07

## 2020-08-27 NOTE — PROGRESS NOTES
Clinic Care Coordination Contact  Care Team Conversations    Per EMILY Fernandez-  Pt to see Dr Eason  Sept 11 @10AM here at the [facility]    Rebecca will inform pt  CC will also inform pt's home care RN Myesha.    Lauren Pipkin, BS-RN   Care Coordination  Primary Care- [facility]  [phone] Option # 1

## 2020-09-10 ENCOUNTER — TELEPHONE (OUTPATIENT)
Dept: FAMILY MEDICINE | Facility: OTHER | Age: 55
End: 2020-09-10

## 2020-09-10 DIAGNOSIS — E11.9 TYPE 2 DIABETES, HBA1C GOAL < 7% (H): Primary | ICD-10-CM

## 2020-09-11 ENCOUNTER — TRANSFERRED RECORDS (OUTPATIENT)
Dept: HEALTH INFORMATION MANAGEMENT | Facility: CLINIC | Age: 55
End: 2020-09-11

## 2020-09-16 ENCOUNTER — ALLIED HEALTH/NURSE VISIT (OUTPATIENT)
Dept: EDUCATION SERVICES | Facility: OTHER | Age: 55
End: 2020-09-16
Attending: NURSE PRACTITIONER
Payer: COMMERCIAL

## 2020-09-16 VITALS
BODY MASS INDEX: 34.3 KG/M2 | HEART RATE: 90 BPM | HEIGHT: 64 IN | SYSTOLIC BLOOD PRESSURE: 115 MMHG | WEIGHT: 200.9 LBS | DIASTOLIC BLOOD PRESSURE: 85 MMHG | RESPIRATION RATE: 16 BRPM | OXYGEN SATURATION: 94 %

## 2020-09-16 DIAGNOSIS — F17.200 NICOTINE DEPENDENCE, UNCOMPLICATED, UNSPECIFIED NICOTINE PRODUCT TYPE: Primary | ICD-10-CM

## 2020-09-16 DIAGNOSIS — E11.65 TYPE 2 DIABETES MELLITUS WITH HYPERGLYCEMIA, WITH LONG-TERM CURRENT USE OF INSULIN (H): ICD-10-CM

## 2020-09-16 DIAGNOSIS — Z79.4 TYPE 2 DIABETES MELLITUS WITH HYPERGLYCEMIA, WITH LONG-TERM CURRENT USE OF INSULIN (H): ICD-10-CM

## 2020-09-16 PROCEDURE — 99213 OFFICE O/P EST LOW 20 MIN: CPT | Performed by: NURSE PRACTITIONER

## 2020-09-16 PROCEDURE — G0463 HOSPITAL OUTPT CLINIC VISIT: HCPCS

## 2020-09-16 RX ORDER — POLYETHYLENE GLYCOL 3350 17 G/17G
17 POWDER, FOR SOLUTION ORAL 3 TIMES DAILY
COMMUNITY
End: 2022-11-01

## 2020-09-16 RX ORDER — SEMAGLUTIDE 1.34 MG/ML
1 INJECTION, SOLUTION SUBCUTANEOUS
Qty: 6 ML | Refills: 3 | Status: SHIPPED | OUTPATIENT
Start: 2020-09-16 | End: 2021-01-06

## 2020-09-16 ASSESSMENT — ANXIETY QUESTIONNAIRES
5. BEING SO RESTLESS THAT IT IS HARD TO SIT STILL: NEARLY EVERY DAY
1. FEELING NERVOUS, ANXIOUS, OR ON EDGE: NOT AT ALL
3. WORRYING TOO MUCH ABOUT DIFFERENT THINGS: NOT AT ALL
IF YOU CHECKED OFF ANY PROBLEMS ON THIS QUESTIONNAIRE, HOW DIFFICULT HAVE THESE PROBLEMS MADE IT FOR YOU TO DO YOUR WORK, TAKE CARE OF THINGS AT HOME, OR GET ALONG WITH OTHER PEOPLE: NOT DIFFICULT AT ALL
4. TROUBLE RELAXING: NOT AT ALL
6. BECOMING EASILY ANNOYED OR IRRITABLE: NOT AT ALL
2. NOT BEING ABLE TO STOP OR CONTROL WORRYING: NOT AT ALL
GAD7 TOTAL SCORE: 3
7. FEELING AFRAID AS IF SOMETHING AWFUL MIGHT HAPPEN: NOT AT ALL

## 2020-09-16 ASSESSMENT — MIFFLIN-ST. JEOR: SCORE: 1477.31

## 2020-09-16 ASSESSMENT — PAIN SCALES - GENERAL: PAINLEVEL: NO PAIN (0)

## 2020-09-16 ASSESSMENT — PATIENT HEALTH QUESTIONNAIRE - PHQ9: SUM OF ALL RESPONSES TO PHQ QUESTIONS 1-9: 3

## 2020-09-16 NOTE — PROGRESS NOTES
SUBJECTIVE:  Marly Cannon, 55 year old, female presents with the following Chief Complaint(s) with HPI to follow:  Chief Complaint   Patient presents with     Diabetes        Diabetes Follow-up    Patient is checking blood sugars: 2x/day.  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    Lab Results   Component Value Date    A1C 7.9 08/07/2020    A1C 8.1 05/04/2020    A1C Test canceled by physician 02/04/2020    A1C 7.9 01/15/2020    A1C 9.8 10/30/2019     Current Diabetes medication:   1. Vgo--Novolog, 3 clicks with food  2. Ozempic 1 mg weekly (Tuesday)  Statin use: yes, simvastatin 20 mg daily  ASA: yes, 81 mg daily    Marly's here today for follow up regarding her diabetes.    Reports the following:  Denies any issues with her V-go.  She loves it.   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     As stated above, she's eating cold cereal for breakfast.      Denies any new health concerns.      Has an appt with Amberly Whyte NP in November for diabetic labs.      Patient Active Problem List   Diagnosis     Type 2 diabetes, HbA1c goal < 7% (H)     ACP (advance care planning)     Stage 3 chronic kidney disease (H)     Pulmonary emphysema (H)     Hyperparathyroidism due to renal insufficiency (H)     Morbid obesity (H)     Vitamin D deficiency     Intellectual disability     Breast fibrocystic disorder     Tobacco abuse     Microalbuminuria due to type 2 diabetes mellitus (H)     H/O colonoscopy     [place] cardiac risk <10% in  next 10 years     Hypomagnesemia     Tubular adenoma of colon     Hyperlipidemia, unspecified hyperlipidemia type     Essential hypertension     Callus of foot     Memory disturbance       History reviewed. No pertinent past medical history.    Past Surgical History:   Procedure Laterality Date     COLONOSCOPY N/A 8/20/2015    Procedure: COLONOSCOPY;  Surgeon: Gentry Porter MD;  Location: [facility]     COLONOSCOPY N/A 9/21/2018    Procedure: COLONOSCOPY;  COLONOSCOPY WITH POLYPECTOMY;  Surgeon: Gentry Porter MD;  Location: [facility]       Family History   Problem Relation Age of Onset     Diabetes Mother      Diabetes Father      Hypertension Brother      Diabetes Sister        Social History     Tobacco Use     Smoking status: Current Every Day Smoker     Packs/day: 1.00     Years: 34.00     Pack years: 34.00     Types: Cigarettes     Start date: 1/1/1979     Smokeless tobacco: Never Used     Tobacco comment: Declined QP 05/13/2020   Substance Use Topics     Alcohol use: No     Alcohol/week: 0.0 standard drinks       Current Outpatient Medications   Medication Sig Dispense Refill     Acetaminophen (TYLENOL PO) Take 325 mg by mouth every 6 hours as needed        amLODIPine (NORVASC) 5 MG tablet Take 1 tablet (5 mg) by mouth daily 90 tablet 3     ammonium lactate (LAC-HYDRIN) 12 % cream Apply topically 2 times daily       aspirin (ASPIRIN LOW DOSE) 81 MG chewable tablet CHEW 1 TABLET BY MOUTH DAILY. DO NOT SPLIT OR CRUSH 90 tablet 3     atorvastatin (LIPITOR) 40 MG tablet Take 1 tablet (40 mg) by mouth At Bedtime 90 tablet 3     budesonide-formoterol (SYMBICORT) 160-4.5 MCG/ACT Inhaler Inhale 2 puffs into the lungs 2 times daily 1 Inhaler 3     Cholecalciferol (VITAMIN D3) 50 MCG (2000 UT) CAPS TAKE 1 CAPSULE BY MOUTH DAILY 100 capsule 0     docusate sodium (COLACE) 100 MG capsule Take 1 capsule (100 mg) by mouth daily 90 capsule 1     hydrocortisone (CORTAID) 1 % external cream Apply topically 2 times daily  "453.6 g 0     hydrocortisone 2.5 % cream Apply topically 2 times daily       insulin aspart (NOVOLOG VIAL) 100 UNITS/ML vial To be used with the V20.  TDD: 56 20 mL 6     insulin pen needle (32G X 4 MM) 32G X 4 MM miscellaneous Use 1 pen needles daily 100 each 3     insulin syringes, disposable, (BD INSULIN SYRINGE) U-100 1 ML MISC 1 Device daily 30 each 2     ipratropium-albuterol (COMBIVENT RESPIMAT)  MCG/ACT inhaler INHALE 1 PUFF INTO THE LUNGS 4 TIMES DAILY 4 g 0     lisinopril (ZESTRIL) 40 MG tablet Take 1 tablet (40 mg) by mouth daily 30 tablet 5     nystatin (MYCOSTATIN) 312862 UNIT/GM external powder Apply topically 3 times daily 60 g 3     ONETOUCH DELICA LANCETS 33G MISC 1 each 3 times daily (Patient taking differently: 1 each 4 times daily ) 100 each 10     ONETOUCH ULTRA test strip TEST BLOOD SUGARS FOUR TIMES A DAY DAILY OR AS DIRECTED 100 strip 1     order for DME Women briefs 50 each 1     polyethylene glycol (MIRALAX) 17 g packet Take 17 g by mouth 3 times daily       primidone (MYSOLINE) 50 MG tablet TAKE 1 TABLET BY MOUTH AT BEDTIME 30 tablet 11     semaglutide (OZEMPIC, 1 MG/DOSE,) 2 MG/1.5ML pen Inject 1 mg Subcutaneous every 7 days 6 mL 3     triamcinolone (KENALOG) 0.1 % cream Apply topically 2 times daily       wearable insulin delivery device (Insight Plus 20) kit Insulin delivery device to be changed every 24 hours (Patient not taking: Reported on 9/16/2020) 30 each 4       No Known Allergies    REVIEW OF SYSTEMS  Skin: negative  Eyes: negative   Ears/Nose/Throat: glasses; negative  Respiratory: No shortness of breath, cough, or hemoptysis;   Cardiovascular: negative  Gastrointestinal: negative  Genitourinary: negative  Musculoskeletal: left knee pain  Neurologic: negative  Psychiatric: negative  Hematologic/Lymphatic/Immunologic: continued  Endocrine: positive for diabetes     OBJECTIVE:  /85   Pulse 90   Resp 16   Ht 1.619 m (5' 3.75\")   Wt 91.1 kg (200 lb 14.4 oz)   SpO2 94%   " BMI 34.76 kg/m    Constitutional: healthy, alert and no distress  Musculoskeletal: extremities normal- no gross deformities noted and gait normal  Skin: no suspicious lesions or rashes to visible skin  Psychiatric: mentation appears normal and affect normal/bright    LABS  No results found for any visits on 09/16/20.    ASSESSMENT / PLAN:  (F17.200) Nicotine dependence, uncomplicated, unspecified nicotine product type  (primary encounter diagnosis)  Comment: noted and refused today  Plan:   Marly's aware to let us know when she's ready to quit smoking.  Discussed the dangers of smoking with diabetes      (E11.65,  Z79.4) Type 2 diabetes mellitus with hyperglycemia, with long-term current use of insulin (H)  Comment: noted  Plan: semaglutide (OZEMPIC, 1 MG/DOSE,) 2 MG/1.5ML         pen          Encouraged her to click 1-2 more times when eating cereal.      Follow up as discussed  Sooner if needed    Patient Instructions   Continue working on healthy eating and moving (start low and slow, work up to 30 min, 5x/week)    BG goals:  Fasting and before meals <130, >70  2 hour after eating <180    We only need 1/2 of these numbers to be within target then your A1c will be within target    Medication changes   Keep with the 3 clicks before meals except when you have cereal or going out to each--do 4 clicks    Follow up   1 month    Call me sooner if any problems/concerns and/or questions develop including consistent low BGs <70 or consistent high BGs >200  [phone] (Unit Coordinator)    [phone] (Nurse)        Time: 35 minutes  Barrier: see [facility]  Willingness to learn: accepting    Courtney PINTO [facility]  Disease Management    Cc: Amberly Whyte NP    With the electronic record, we can now more quickly and easily track our patient diabetic goals. Our diabetes clinical review is in progress and these are the indicators we are monitoring for good diabetes health.     1.) HbA1C less than 7 (measurement of your  average blood sugars)  2.) Blood Pressure less than 140/80  3.) LDL less than 100 (bad cholesterol)  4.) HbA1C is checked in the last 6 months and below 7% (more frequently if not at goal or adjusting medications)  5.) LDL is checked in the last 12 months (more frequently if not at goal or adjusting medications)  6.) Taking one baby aspirin daily (unless otherwise instructed)  7.) No tobacco use  8) Statin use     You have achieved 6 out of 8 of these and I am encouraging you to come in and get tuned up to achieve 8 out of 8!  Here is what you have achieved so far in my goals for you:  1.) HbA1C  less than 7:                              NO  Your last  HbA1C :  Lab Results   Component Value Date    A1C 7.9 08/07/2020    A1C 8.1 05/04/2020    A1C Test canceled by physician 02/04/2020    A1C 7.9 01/15/2020    A1C 9.8 10/30/2019     2.) Blood Pressure less than 140/80:       YES      Your last    BP Readings from Last 1 Encounters:   09/16/20 115/85     3.) LDL less than 100:                              YES      Your last     LDL Cholesterol Calculated   Date Value Ref Range Status   02/04/2020 49 <100 mg/dL Final     Comment:     Desirable:       <100 mg/dl       4.) Checked HbA1C in the past 6 months: YES      5.) Checked LDL in the past 12 months:    YES      6.) Taking one aspirin daily:                       YES     7.) No tobacco use:                                        NO   8.) Statin use      YES

## 2020-09-16 NOTE — PROGRESS NOTES
"Chief Complaint   Patient presents with     Diabetes       Initial /85   Pulse 90   Resp 16   Ht 1.619 m (5' 3.75\")   Wt 91.1 kg (200 lb 14.4 oz)   SpO2 94%   BMI 34.76 kg/m   Estimated body mass index is 34.76 kg/m  as calculated from the following:    Height as of this encounter: 1.619 m (5' 3.75\").    Weight as of this encounter: 91.1 kg (200 lb 14.4 oz).  Medication Reconciliation: complete  Gege Walter LPN    "

## 2020-09-16 NOTE — PATIENT INSTRUCTIONS
Continue working on healthy eating and moving (start low and slow, work up to 30 min, 5x/week)    BG goals:  Fasting and before meals <130, >70  2 hour after eating <180    We only need 1/2 of these numbers to be within target then your A1c will be within target    Medication changes   Keep with the 3 clicks before meals except when you have cereal or going out to each--do 4 clicks    Follow up   1 month    Call me sooner if any problems/concerns and/or questions develop including consistent low BGs <70 or consistent high BGs >200  [phone] (Unit Coordinator)    [phone] (Nurse)

## 2020-09-17 ASSESSMENT — ANXIETY QUESTIONNAIRES: GAD7 TOTAL SCORE: 3

## 2020-09-23 ENCOUNTER — PATIENT OUTREACH (OUTPATIENT)
Dept: CARE COORDINATION | Facility: OTHER | Age: 55
End: 2020-09-23

## 2020-09-23 ASSESSMENT — ACTIVITIES OF DAILY LIVING (ADL): DEPENDENT_IADLS:: INDEPENDENT

## 2020-09-23 NOTE — PROGRESS NOTES
Clinic Care Coordination Contact  Care Team Conversations    CC received an update from Courtney Chaudhary NP following pt's visit with diabetic ed on 9/16/20.    Pt is doing very well with the V-Go.  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  She denied new health concerns.      Pt has future appts scheduled as follows:    Next 5 appointments (look out 90 days)    Nov 09, 2020  9:30 AM CST  (Arrive by 9:15 AM)  SHORT with Amberly Whyte NP  [facility] [address]  [place] [address]  [phone]        10/20/20 @ 11AM with Courtney Chaudhary NP     CC continues to follow.    Lauren Pipkin, BS-RN   Care Coordination  Primary Care- [facility]  [phone] Option # 1

## 2020-10-06 ENCOUNTER — TELEPHONE (OUTPATIENT)
Dept: FAMILY MEDICINE | Facility: OTHER | Age: 55
End: 2020-10-06

## 2020-10-06 NOTE — TELEPHONE ENCOUNTER
[facility]  [phone]    Verbal order to continue weekly nursing.

## 2020-10-20 ENCOUNTER — ALLIED HEALTH/NURSE VISIT (OUTPATIENT)
Dept: EDUCATION SERVICES | Facility: OTHER | Age: 55
End: 2020-10-20
Attending: NURSE PRACTITIONER
Payer: COMMERCIAL

## 2020-10-20 VITALS
HEIGHT: 64 IN | DIASTOLIC BLOOD PRESSURE: 88 MMHG | OXYGEN SATURATION: 94 % | SYSTOLIC BLOOD PRESSURE: 139 MMHG | HEART RATE: 86 BPM | WEIGHT: 204 LBS | BODY MASS INDEX: 34.83 KG/M2

## 2020-10-20 DIAGNOSIS — F17.210 NICOTINE DEPENDENCE, CIGARETTES, UNCOMPLICATED: ICD-10-CM

## 2020-10-20 DIAGNOSIS — Z71.6 TOBACCO ABUSE COUNSELING: ICD-10-CM

## 2020-10-20 DIAGNOSIS — F17.200 NICOTINE DEPENDENCE, UNCOMPLICATED, UNSPECIFIED NICOTINE PRODUCT TYPE: ICD-10-CM

## 2020-10-20 DIAGNOSIS — Z79.4 TYPE 2 DIABETES MELLITUS WITH HYPERGLYCEMIA, WITH LONG-TERM CURRENT USE OF INSULIN (H): Primary | ICD-10-CM

## 2020-10-20 DIAGNOSIS — E11.65 TYPE 2 DIABETES MELLITUS WITH HYPERGLYCEMIA, WITH LONG-TERM CURRENT USE OF INSULIN (H): Primary | ICD-10-CM

## 2020-10-20 PROCEDURE — G0463 HOSPITAL OUTPT CLINIC VISIT: HCPCS

## 2020-10-20 PROCEDURE — 99213 OFFICE O/P EST LOW 20 MIN: CPT | Performed by: NURSE PRACTITIONER

## 2020-10-20 ASSESSMENT — PAIN SCALES - GENERAL: PAINLEVEL: NO PAIN (0)

## 2020-10-20 ASSESSMENT — MIFFLIN-ST. JEOR: SCORE: 1491.37

## 2020-10-20 NOTE — PATIENT INSTRUCTIONS
Continue working on healthy eating and moving (start low and slow, work up to 30 min, 5x/week)    BG goals:  Fasting and before meals <130, >70  2 hour after eating <180    We only need 1/2 of these numbers to be within target then your A1c will be within target    Medication changes   Supper: 3 or 4 clicks  Cereal: 4 clicks    Follow up   End of November/beginning of December    Call me sooner if any problems/concerns and/or questions develop including consistent low BGs <70 or consistent high BGs >200  [phone] (Unit Coordinator)    [phone] (Nurse)

## 2020-10-20 NOTE — PROGRESS NOTES
"Chief Complaint   Patient presents with     Diabetes       Initial There were no vitals taken for this visit. Estimated body mass index is 34.76 kg/m  as calculated from the following:    Height as of 9/16/20: 1.619 m (5' 3.75\").    Weight as of 9/16/20: 91.1 kg (200 lb 14.4 oz).  Medication Reconciliation: complete  Gege Walter LPN    "

## 2020-10-20 NOTE — PROGRESS NOTES
SUBJECTIVE:  Marly Cannon, 55 year old, female presents with the following Chief Complaint(s) with HPI to follow:  Chief Complaint   Patient presents with     Diabetes        Diabetes Follow-up    Patient is checking blood sugars: 2x/day.  Results:  Highest: 261  Lowest: 96  Period average: 163       Symptoms of hypoglycemia (low blood sugar): none    Paresthesias (numbness or burning in feet) or sores: No    Diabetic eye exam within the last year: Yes    Breakfast eaten regularly: yes--cereal    Patient counting carbs: Yes    Exercising: walking during the nice days; Monday, Wednesday, Friday--work downtown          HPI:  Marly's here today for the follow up regarding her Diabetes mellitus, Type 2.    Lab Results   Component Value Date    A1C 7.9 08/07/2020    A1C 8.1 05/04/2020    A1C Test canceled by physician 02/04/2020    A1C 7.9 01/15/2020    A1C 9.8 10/30/2019       Current Diabetes medication:   1. Vgo--Novolog, 2 clicks with food  2. Ozempic 1 mg weekly (Tuesday)  Statin use: yes, simvastatin 20 mg daily  ASA: yes, 81 mg daily    Marly's here today for follow up regarding her diabetes.    Reports the following:  Denies any issues with her V-go.  She loves it.     Denies any new health concerns.      Has an appt with Amberly Whyte NP in November for diabetic labs.      Patient Active Problem List   Diagnosis     Type 2 diabetes, HbA1c goal < 7% (H)     ACP (advance care planning)     Stage 3 chronic kidney disease     Pulmonary emphysema (H)     Hyperparathyroidism due to renal insufficiency (H)     Morbid obesity (H)     Vitamin D deficiency     Intellectual disability     Breast fibrocystic disorder     Tobacco abuse     Microalbuminuria due to type 2 diabetes mellitus (H)     H/O colonoscopy     [place] cardiac risk <10% in next 10 years     Hypomagnesemia     Tubular adenoma of colon     Hyperlipidemia, unspecified hyperlipidemia type     Essential hypertension     Callus of foot     Memory  disturbance       History reviewed. No pertinent past medical history.    Past Surgical History:   Procedure Laterality Date     COLONOSCOPY N/A 8/20/2015    Procedure: COLONOSCOPY;  Surgeon: Gentry Porter MD;  [facility]: [facility]     COLONOSCOPY N/A 9/21/2018    Procedure: COLONOSCOPY;  COLONOSCOPY WITH POLYPECTOMY;  Surgeon: Gentry Porter MD;  Location: [facility]       Family History   Problem Relation Age of Onset     Diabetes Mother      Diabetes Father      Hypertension Brother      Diabetes Sister        Social History     Tobacco Use     Smoking status: Current Every Day Smoker     Packs/day: 1.00     Years: 34.00     Pack years: 34.00     Types: Cigarettes     Start date: 1/1/1979     Smokeless tobacco: Never Used     Tobacco comment: Declined QP 05/13/2020   Substance Use Topics     Alcohol use: No     Alcohol/week: 0.0 standard drinks       Current Outpatient Medications   Medication Sig Dispense Refill     Acetaminophen (TYLENOL PO) Take 325 mg by mouth every 6 hours as needed        amLODIPine (NORVASC) 5 MG tablet Take 1 tablet (5 mg) by mouth daily 90 tablet 3     ammonium lactate (LAC-HYDRIN) 12 % cream Apply topically 2 times daily       aspirin (ASPIRIN LOW DOSE) 81 MG chewable tablet CHEW 1 TABLET BY MOUTH DAILY. DO NOT SPLIT OR CRUSH 90 tablet 3     atorvastatin (LIPITOR) 40 MG tablet Take 1 tablet (40 mg) by mouth At Bedtime 90 tablet 3     budesonide-formoterol (SYMBICORT) 160-4.5 MCG/ACT Inhaler Inhale 2 puffs into the lungs 2 times daily 1 Inhaler 3     COMBIVENT RESPIMAT  MCG/ACT inhaler INHALE 1 PUFF INTO THE LUNGS 4 TIMES DAILY 4 g 0     docusate sodium (COLACE) 100 MG capsule Take 1 capsule (100 mg) by mouth daily 90 capsule 1     hydrocortisone (CORTAID) 1 % external cream Apply topically 2 times daily 453.6 g 0     hydrocortisone 2.5 % cream Apply topically 2 times daily       insulin aspart (NOVOLOG VIAL) 100 UNITS/ML vial To be used with the V20.  TDD: 56 20 mL 6      "insulin pen needle (32G X 4 MM) 32G X 4 MM miscellaneous Use 1 pen needles daily 100 each 3     insulin syringes, disposable, (BD INSULIN SYRINGE) U-100 1 ML MISC 1 Device daily 30 each 2     lisinopril (ZESTRIL) 40 MG tablet Take 1 tablet (40 mg) by mouth daily 30 tablet 5     nystatin (MYCOSTATIN) 063795 UNIT/GM external powder Apply topically 3 times daily 60 g 3     ONETOUCH DELICA LANCETS 33G MISC 1 each 3 times daily (Patient taking differently: 1 each 4 times daily ) 100 each 10     ONETOUCH ULTRA test strip TEST BLOOD SUGARS FOUR TIMES A DAY DAILY OR AS DIRECTED 100 strip 1     order for DME Women briefs 50 each 1     polyethylene glycol (MIRALAX) 17 g packet Take 17 g by mouth 3 times daily       primidone (MYSOLINE) 50 MG tablet TAKE 1 TABLET BY MOUTH AT BEDTIME 30 tablet 11     semaglutide (OZEMPIC, 1 MG/DOSE,) 2 MG/1.5ML pen Inject 1 mg Subcutaneous every 7 days 6 mL 3     triamcinolone (KENALOG) 0.1 % cream Apply topically 2 times daily       wearable insulin delivery device (Tipstar 20) kit Insulin delivery device to be changed every 24 hours 30 each 4     Cholecalciferol (VITAMIN D3) 50 MCG (2000 UT) CAPS TAKE 1 CAPSULE BY MOUTH DAILY 100 capsule 0       No Known Allergies    REVIEW OF SYSTEMS  Skin: negative  Eyes: negative   Ears/Nose/Throat: glasses; negative  Respiratory: No shortness of breath, cough, or hemoptysis;   Cardiovascular: negative  Gastrointestinal: negative  Genitourinary: negative  Musculoskeletal: left knee pain--not too bad--wearing a knee brace   Neurologic: negative  Psychiatric: negative  Hematologic/Lymphatic/Immunologic: continued  Endocrine: positive for diabetes     OBJECTIVE:  /88   Pulse 86   Ht 1.619 m (5' 3.75\")   Wt 92.5 kg (204 lb)   SpO2 94%   BMI 35.29 kg/m    Constitutional: healthy, alert and no distress  Musculoskeletal: extremities normal- no gross deformities noted and gait normal  Skin: no suspicious lesions or rashes to visible skin  Psychiatric: " mentation appears normal and affect normal/bright    LABS  No results found for any visits on 10/20/20.    ASSESSMENT / PLAN:  (E11.65,  Z79.4) Type 2 diabetes mellitus with hyperglycemia, with long-term current use of insulin (H)  (primary encounter diagnosis)  Comment: noted  Plan:   BG ave looks good    No changes    (F17.200) Nicotine dependence, uncomplicated, unspecified nicotine product type  Comment: noted and refused  Plan:   Marly's aware to let us know when she's ready to quit smoking.  Discussed the dangers of smoking with diabetes      (F17.210) Nicotine dependence, cigarettes, uncomplicated   Comment: noted  Plan:   As stated above    (Z71.6) Tobacco abuse counseling  Comment: noted  Plan:   As stated above    Keep up the hard work  Let me or Amberly know when you are ready to quit smoking    Follow up with Amberly in November    Call with questions        Patient Instructions   Continue working on healthy eating and moving (start low and slow, work up to 30 min, 5x/week)    BG goals:  Fasting and before meals <130, >70  2 hour after eating <180    We only need 1/2 of these numbers to be within target then your A1c will be within target    Medication changes   Supper: 3 or 4 clicks  Cereal: 4 clicks    Follow up   End of November/beginning of December    Call me sooner if any problems/concerns and/or questions develop including consistent low BGs <70 or consistent high BGs >200  [phone] (Unit Coordinator)    [phone] (Nurse)        Time: 35 minutes  Barrier: see PMH  Willingness to learn: accepting    Courtney PINTO [facility]  Disease Management    Cc: Amberly Whyte NP    With the electronic record, we can now more quickly and easily track our patient diabetic goals. Our diabetes clinical review is in progress and these are the indicators we are monitoring for good diabetes health.     1.) HbA1C less than 7 (measurement of your average blood sugars)  2.) Blood Pressure less than 140/80  3.)  LDL less than 100 (bad cholesterol)  4.) HbA1C is checked in the last 6 months and below 7% (more frequently if not at goal or adjusting medications)  5.) LDL is checked in the last 12 months (more frequently if not at goal or adjusting medications)  6.) Taking one baby aspirin daily (unless otherwise instructed)  7.) No tobacco use  8) Statin use     You have achieved 6 out of 8 of these and I am encouraging you to come in and get tuned up to achieve 8 out of 8!  Here is what you have achieved so far in my goals for you:  1.) HbA1C  less than 7:                              NO  Your last  HbA1C :  Lab Results   Component Value Date    A1C 7.9 08/07/2020    A1C 8.1 05/04/2020    A1C Test canceled by physician 02/04/2020    A1C 7.9 01/15/2020    A1C 9.8 10/30/2019     2.) Blood Pressure less than 140/80:       YES      Your last    BP Readings from Last 1 Encounters:   10/20/20 139/88     3.) LDL less than 100:                              YES      Your last     LDL Cholesterol Calculated   Date Value Ref Range Status   02/04/2020 49 <100 mg/dL Final     Comment:     Desirable:       <100 mg/dl       4.) Checked HbA1C in the past 6 months: YES      5.) Checked LDL in the past 12 months:    YES      6.) Taking one aspirin daily:                       YES     7.) No tobacco use:                                        NO   8.) Statin use      YES

## 2020-10-27 DIAGNOSIS — E55.9 VITAMIN D DEFICIENCY: ICD-10-CM

## 2020-10-27 RX ORDER — ACETAMINOPHEN 160 MG
TABLET,DISINTEGRATING ORAL
Qty: 100 CAPSULE | Refills: 0 | Status: SHIPPED | OUTPATIENT
Start: 2020-10-27 | End: 2021-02-03

## 2020-10-27 NOTE — TELEPHONE ENCOUNTER
VITAMIN D3      Last Written Prescription Date:  7-  Last Fill Quantity: 100,   # refills: 0  Last Office Visit: 8-7-2020  Future Office visit:    Next 5 appointments (look out 90 days)    Nov 09, 2020  9:30 AM  (Arrive by 9:15 AM)  SHORT with Amberly Whyte NP  [facility] ([facility] [place] ) [address]  [place] [address]  [phone]

## 2020-11-07 NOTE — PROGRESS NOTES
Subjective     Marly Cannon is a 55 year old female who presents to clinic today for the following health issues:    HPI         Diabetes Follow-up    How often are you checking your blood sugar? 4 times daily  Blood sugar testing frequency justification:  Patient modifying lifestyle changes (diet, exercise) with blood sugars  What time of day are you checking your blood sugars (select all that apply)?  Before meals  Have you had any blood sugars above 200?  No  Have you had any blood sugars below 70?  No    What symptoms do you notice when your blood sugar is low?  None    What concerns do you have today about your diabetes? None     Do you have any of these symptoms? (Select all that apply)  No numbness or tingling in feet.  No redness, sores or blisters on feet.  No complaints of excessive thirst.  No reports of blurry vision.  No significant changes to weight.   -Microalbuminuria present. On ACE.   -She does have chronic kidney disease. Avoids NSAIDs.   -Taking Ozempic once weekly. Do note, she has trouble giving herself the medication, but the home care nurse helps her. Also on sliding scale Novolog.   -A1C was 7.9 on 8/7//20.  -She denies chest pain, shortness of breath, dizziness, syncope, or palpitations.  -On asa.     Hyperlipidemia Follow-Up      Are you regularly taking any medication or supplement to lower your cholesterol?   Yes, atovastatin    Are you having muscle aches or other side effects that you think could be caused by your cholesterol lowering medication?  No     Also on asa.   - As noted above, she denies chest pain, shortness of breath, dizziness, syncope, or palpitations.    Hypertension Follow-up      Do you check your blood pressure regularly outside of the clinic? Yes, has a nurse that comes in on Tuesdays, readings around 120/80     Are you following a low salt diet? No    Are your blood pressures ever more than 140 on the top number (systolic) OR more   than 90 on the bottom number  "(diastolic), for example 140/90? No   -Taking amlodipine and lisinopril without side effects.      BP Readings from Last 2 Encounters:   10/20/20 139/88   09/16/20 115/85     Hemoglobin A1C (%)   Date Value   08/07/2020 7.9 (H)   05/04/2020 8.1 (H)     LDL Cholesterol Calculated (mg/dL)   Date Value   02/04/2020 49   03/29/2019 72       COPD Follow-Up    Overall, how are your COPD symptoms since your last clinic visit?  Better    How much fatigue or shortness of breath do you have when you are walking?  On occasion     How much shortness of breath do you have when you are resting?  Same as usual    How often do you cough? Rarely    Have you noticed any change in your sputum/phlegm?  No    Have you experienced a recent fever? No    Please describe how far you can walk without stopping to rest:  Less than 1 block    How many flights of stairs are you able to walk up without stopping?  None    Have you had any Emergency Room Visits, Urgent Care Visits, or Hospital Admissions because of your COPD since your last office visit?  No     Using Combivent and Symbicort without side effects. Continues to smoke, smoking 1-2 ppd. No interest in quitting. No fevers. No blood in sputum. No unintentional weight loss.     History   Smoking Status     Current Every Day Smoker     Packs/day: 1.00     Years: 34.00     Types: Cigarettes     Start date: 1/1/1979   Smokeless Tobacco     Never Used     Comment: Declined QP 05/13/2020     No results found for: FEV1, HSI0RGY    Due for the influenza vaccine. Willing to get.     Review of Systems   As noted in the HPI.       Objective    /82 (BP Location: Right arm, Patient Position: Sitting, Cuff Size: Adult Large)   Pulse 80   Temp 98.2  F (36.8  C) (Tympanic)   Resp 20   Ht 1.619 m (5' 3.75\")   Wt 91.6 kg (202 lb)   SpO2 93%   BMI 34.95 kg/m    Body mass index is 34.95 kg/m .  Physical Exam   GENERAL: alert, no distress and obese  EYES: Eyes grossly normal to inspection, PERRL " and conjunctivae and sclerae normal  HENT: ear canals and TM's normal, nose and mouth without ulcers or lesions  NECK: no adenopathy, no asymmetry, masses, or scars and thyroid normal to palpation, no carotid bruits  RESP: lungs clear to auscultation - no rales, rhonchi or wheezes  CV: regular rate and rhythm, normal S1 S2, no S3 or S4, no murmur, click or rub, trace bilateral peripheral margaret  NEURO: Normal strength and tone, mentation intact and speech normal  PSYCH: mentation appears normal, affect normal/bright  Diabetic foot exam: no trophic changes or ulcerative lesions, normal sensory exam, and pulses present but diminished     Results for orders placed or performed in visit on 11/09/20 (from the past 24 hour(s))   Basic metabolic panel   Result Value Ref Range    Sodium 141 133 - 144 mmol/L    Potassium 3.9 3.4 - 5.3 mmol/L    Chloride 107 94 - 109 mmol/L    Carbon Dioxide 30 20 - 32 mmol/L    Anion Gap 4 3 - 14 mmol/L    Glucose 173 (H) 70 - 99 mg/dL    Urea Nitrogen 20 7 - 30 mg/dL    Creatinine 1.11 (H) 0.52 - 1.04 mg/dL    GFR Estimate 55 (L) >60 mL/min/[1.73_m2]    GFR Estimate If Black 64 >60 mL/min/[1.73_m2]    Calcium 9.3 8.5 - 10.1 mg/dL   Lipid Profile   Result Value Ref Range    Cholesterol 151 <200 mg/dL    Triglycerides 120 <150 mg/dL    HDL Cholesterol 53 >49 mg/dL    LDL Cholesterol Calculated 74 <100 mg/dL    Non HDL Cholesterol 98 <130 mg/dL   Hemoglobin A1c   Result Value Ref Range    Hemoglobin A1C 7.6 (H) 0 - 5.6 %   Estimated Average Glucose   Result Value Ref Range    Estimated Average Glucose 171 mg/dL           Assessment & Plan     Type 2 diabetes, HbA1c goal < 7% (H)  A1C 7.6 which is much improved from the past. [facility]. On statin. On asa. BP at goal. Eye exam UTD. Follow up 3 months.     - Hemoglobin A1c; Future  - Basic metabolic panel; Future    Essential hypertension  Well controlled. Continue current medications. Encouraged daily exercise and  a low sodium diet. Recommended checking BP's 2x/wk, call the clinic if consistantly s>140 or d>90. Follow up in 3 months.     - Home Blood Pressure Monitor Order for DME - ONLY FOR DME    Hyperlipidemia, unspecified hyperlipidemia type  On statin. Tolerating. Will continue.     Microalbuminuria due to type 2 diabetes mellitus (H)  On ACE. Will continue.     Pulmonary emphysema, unspecified emphysema type (H)  Controlled. Continue current medications.     Morbid obesity (H)  BMI 35. Diet and exercise encouraged.     Tobacco abuse  Cessation encouraged.     Need for prophylactic vaccination and inoculation against influenza  - WA RIV4 (FLUBLOK) VACCINE RECOMBINANT DNA PRSRV ANTIBIO FREE, IM [[id number]]    Callus of foot  Follows with podiatry.     Elevated hemoglobin (H)  Unsure cause. Will add on peripheral smear. She also has COPD which could be causing this. Never has had sleep study. BMI is 34. Will see if pt will do sleep study to also r/o sleep apnea.     - CBC with platelets and differential    Stage 3 chronic kidney disease, unspecified whether stage 3a or 3b CKD  Plan: Stable. Baseline creatinine around 1.1. Will avoid NSAIDs and limit sodium intake to 2 grams or less daily.        Tobacco Cessation:   reports that she has been smoking cigarettes. She started smoking about 41 years ago. She has a 34.00 pack-year smoking history. She has never used smokeless tobacco.  Tobacco Cessation Action Plan: Information offered: Patient not interested at this time       Return in about 3 months (around 2/9/2021).    Amberly Whyte NP  [facility] [place]

## 2020-11-09 ENCOUNTER — OFFICE VISIT (OUTPATIENT)
Dept: FAMILY MEDICINE | Facility: OTHER | Age: 55
End: 2020-11-09
Attending: NURSE PRACTITIONER
Payer: COMMERCIAL

## 2020-11-09 VITALS
SYSTOLIC BLOOD PRESSURE: 120 MMHG | BODY MASS INDEX: 34.49 KG/M2 | RESPIRATION RATE: 20 BRPM | WEIGHT: 202 LBS | DIASTOLIC BLOOD PRESSURE: 82 MMHG | HEART RATE: 80 BPM | TEMPERATURE: 98.2 F | HEIGHT: 64 IN | OXYGEN SATURATION: 93 %

## 2020-11-09 DIAGNOSIS — E78.5 HYPERLIPIDEMIA, UNSPECIFIED HYPERLIPIDEMIA TYPE: ICD-10-CM

## 2020-11-09 DIAGNOSIS — R80.9 MICROALBUMINURIA DUE TO TYPE 2 DIABETES MELLITUS (H): ICD-10-CM

## 2020-11-09 DIAGNOSIS — Z72.0 TOBACCO ABUSE: ICD-10-CM

## 2020-11-09 DIAGNOSIS — I10 ESSENTIAL HYPERTENSION: ICD-10-CM

## 2020-11-09 DIAGNOSIS — D58.2 ELEVATED HEMOGLOBIN (H): Primary | ICD-10-CM

## 2020-11-09 DIAGNOSIS — E11.9 TYPE 2 DIABETES, HBA1C GOAL < 7% (H): ICD-10-CM

## 2020-11-09 DIAGNOSIS — J43.9 PULMONARY EMPHYSEMA, UNSPECIFIED EMPHYSEMA TYPE (H): ICD-10-CM

## 2020-11-09 DIAGNOSIS — L84 CALLUS OF FOOT: ICD-10-CM

## 2020-11-09 DIAGNOSIS — Z23 NEED FOR PROPHYLACTIC VACCINATION AND INOCULATION AGAINST INFLUENZA: Primary | ICD-10-CM

## 2020-11-09 DIAGNOSIS — D58.2 ELEVATED HEMOGLOBIN (H): ICD-10-CM

## 2020-11-09 DIAGNOSIS — E66.01 MORBID OBESITY (H): ICD-10-CM

## 2020-11-09 DIAGNOSIS — N18.30 STAGE 3 CHRONIC KIDNEY DISEASE, UNSPECIFIED WHETHER STAGE 3A OR 3B CKD (H): ICD-10-CM

## 2020-11-09 DIAGNOSIS — E11.29 MICROALBUMINURIA DUE TO TYPE 2 DIABETES MELLITUS (H): ICD-10-CM

## 2020-11-09 PROBLEM — E83.42 HYPOMAGNESEMIA: Status: RESOLVED | Noted: 2018-04-10 | Resolved: 2020-11-09

## 2020-11-09 LAB
ANION GAP SERPL CALCULATED.3IONS-SCNC: 4 MMOL/L (ref 3–14)
BASOPHILS # BLD AUTO: 0.1 10E9/L (ref 0–0.2)
BASOPHILS NFR BLD AUTO: 1.1 %
BUN SERPL-MCNC: 20 MG/DL (ref 7–30)
CALCIUM SERPL-MCNC: 9.3 MG/DL (ref 8.5–10.1)
CHLORIDE SERPL-SCNC: 107 MMOL/L (ref 94–109)
CHOLEST SERPL-MCNC: 151 MG/DL
CO2 SERPL-SCNC: 30 MMOL/L (ref 20–32)
CREAT SERPL-MCNC: 1.11 MG/DL (ref 0.52–1.04)
DIFFERENTIAL METHOD BLD: ABNORMAL
EOSINOPHIL # BLD AUTO: 0.2 10E9/L (ref 0–0.7)
EOSINOPHIL NFR BLD AUTO: 2.3 %
ERYTHROCYTE [DISTWIDTH] IN BLOOD BY AUTOMATED COUNT: 12.9 % (ref 10–15)
EST. AVERAGE GLUCOSE BLD GHB EST-MCNC: 171 MG/DL
GFR SERPL CREATININE-BSD FRML MDRD: 55 ML/MIN/{1.73_M2}
GLUCOSE SERPL-MCNC: 173 MG/DL (ref 70–99)
HBA1C MFR BLD: 7.6 % (ref 0–5.6)
HCT VFR BLD AUTO: 50.1 % (ref 35–47)
HDLC SERPL-MCNC: 53 MG/DL
HGB BLD-MCNC: 16.7 G/DL (ref 11.7–15.7)
IMM GRANULOCYTES # BLD: 0 10E9/L (ref 0–0.4)
IMM GRANULOCYTES NFR BLD: 0.3 %
LDLC SERPL CALC-MCNC: 74 MG/DL
LYMPHOCYTES # BLD AUTO: 3 10E9/L (ref 0.8–5.3)
LYMPHOCYTES NFR BLD AUTO: 40 %
MCH RBC QN AUTO: 29.8 PG (ref 26.5–33)
MCHC RBC AUTO-ENTMCNC: 33.3 G/DL (ref 31.5–36.5)
MCV RBC AUTO: 90 FL (ref 78–100)
MONOCYTES # BLD AUTO: 0.5 10E9/L (ref 0–1.3)
MONOCYTES NFR BLD AUTO: 7.1 %
NEUTROPHILS # BLD AUTO: 3.7 10E9/L (ref 1.6–8.3)
NEUTROPHILS NFR BLD AUTO: 49.2 %
NONHDLC SERPL-MCNC: 98 MG/DL
NRBC # BLD AUTO: 0 10*3/UL
NRBC BLD AUTO-RTO: 0 /100
PLATELET # BLD AUTO: 287 10E9/L (ref 150–450)
POTASSIUM SERPL-SCNC: 3.9 MMOL/L (ref 3.4–5.3)
RBC # BLD AUTO: 5.6 10E12/L (ref 3.8–5.2)
RETICS # AUTO: 91.7 10E9/L (ref 25–95)
RETICS/RBC NFR AUTO: 1.6 % (ref 0.5–2)
SODIUM SERPL-SCNC: 141 MMOL/L (ref 133–144)
TRIGL SERPL-MCNC: 120 MG/DL
WBC # BLD AUTO: 7.5 10E9/L (ref 4–11)

## 2020-11-09 PROCEDURE — 36415 COLL VENOUS BLD VENIPUNCTURE: CPT | Mod: ZL | Performed by: NURSE PRACTITIONER

## 2020-11-09 PROCEDURE — 83036 HEMOGLOBIN GLYCOSYLATED A1C: CPT | Mod: ZL | Performed by: NURSE PRACTITIONER

## 2020-11-09 PROCEDURE — 80048 BASIC METABOLIC PNL TOTAL CA: CPT | Mod: ZL | Performed by: NURSE PRACTITIONER

## 2020-11-09 PROCEDURE — 999N001182 HC STATISTIC ESTIMATED AVERAGE GLUCOSE: Mod: ZL | Performed by: NURSE PRACTITIONER

## 2020-11-09 PROCEDURE — 99214 OFFICE O/P EST MOD 30 MIN: CPT | Performed by: NURSE PRACTITIONER

## 2020-11-09 PROCEDURE — 85045 AUTOMATED RETICULOCYTE COUNT: CPT | Mod: ZL | Performed by: NURSE PRACTITIONER

## 2020-11-09 PROCEDURE — 999N001109 HC STATISTIC MORPHOLOGY W/INTERP HISTOLOGY TC 85060: Mod: ZL | Performed by: NURSE PRACTITIONER

## 2020-11-09 PROCEDURE — G0008 ADMIN INFLUENZA VIRUS VAC: HCPCS

## 2020-11-09 PROCEDURE — 85025 COMPLETE CBC W/AUTO DIFF WBC: CPT | Mod: ZL | Performed by: NURSE PRACTITIONER

## 2020-11-09 PROCEDURE — G0463 HOSPITAL OUTPT CLINIC VISIT: HCPCS | Mod: 25

## 2020-11-09 PROCEDURE — 80061 LIPID PANEL: CPT | Mod: ZL | Performed by: NURSE PRACTITIONER

## 2020-11-09 RX ORDER — POLYETHYLENE GLYCOL 3350 17 G/17G
POWDER, FOR SOLUTION ORAL
COMMUNITY
Start: 2020-05-04 | End: 2020-11-09

## 2020-11-09 ASSESSMENT — PAIN SCALES - GENERAL: PAINLEVEL: NO PAIN (0)

## 2020-11-09 ASSESSMENT — MIFFLIN-ST. JEOR: SCORE: 1482.3

## 2020-11-09 NOTE — NURSING NOTE
"Chief Complaint   Patient presents with     Hypertension     Hyperlipidemia     Diabetes     COPD       Initial /82 (BP Location: Right arm, Patient Position: Sitting, Cuff Size: Adult Large)   Pulse 80   Temp 98.2  F (36.8  C) (Tympanic)   Resp 20   Ht 1.619 m (5' 3.75\")   Wt 91.6 kg (202 lb)   SpO2 93%   BMI 34.95 kg/m   Estimated body mass index is 34.95 kg/m  as calculated from the following:    Height as of this encounter: 1.619 m (5' 3.75\").    Weight as of this encounter: 91.6 kg (202 lb).  Medication Reconciliation: complete  Jessika Haynes LPN    "

## 2020-11-10 LAB — COPATH REPORT: NORMAL

## 2020-11-24 ENCOUNTER — DOCUMENTATION ONLY (OUTPATIENT)
Dept: SLEEP MEDICINE | Facility: HOSPITAL | Age: 55
End: 2020-11-24

## 2020-11-24 DIAGNOSIS — E11.9 TYPE 2 DIABETES, HBA1C GOAL < 7% (H): Primary | ICD-10-CM

## 2020-11-24 DIAGNOSIS — I10 ESSENTIAL HYPERTENSION: ICD-10-CM

## 2020-11-24 DIAGNOSIS — E66.01 MORBID OBESITY (H): ICD-10-CM

## 2020-11-24 DIAGNOSIS — N18.30 STAGE 3 CHRONIC KIDNEY DISEASE, UNSPECIFIED WHETHER STAGE 3A OR 3B CKD (H): ICD-10-CM

## 2020-11-24 NOTE — PROGRESS NOTES
SLEEP HISTORY QUESTIONNAIRE    Please describe the main reason for your sleep appointment?    How long has this been a problem? years    Have you been diagnosed with a sleep problem in the past? NO    If so, what?     What treatment was recommended?     Have you had a sleep study in the past? NO    If yes, where and when?     Sleep Habits:   Do you read in bed? No  Do you eat in bed? Yes  Do you watch TV in bed? Yes  Do you work in bed? No  Do you use a phone or computer in bed? No    Is you sleep disturbed by:   Bed partner: Yes  Children: No  Noise: No   Pets: No  Other:       On two or more nights per week, do you drink alcohol to help you fall asleep?NO    On two or more nights per week, do you take melatonin to help you fall asleep? NO    On two or more nights per week, do you take over the counter medicine to fall asleep?  NO    Do you take drinks with caffeine (coffee, tea, soda, energy drinks)? YES    Do you have 3 or more caffeine drinks in a day? NO    Do you have caffeine drinks within 6 hours of bedtime? NO    Do you smoke or use tobacco? YES    Do you exercise? NO    Sleep Routine:   Using a 24 Hour Clock    What time do you usually get into bed on workdays?     Weekend/non work days?     What time do you get out of bed on workdays?       Weekend/non work days?    Do you work the evening or night shift or do your shifts rotate? DOES NOT APPLY    How long does it usually take to fall to sleep? 2 hours    How many times do you wake during the night? All the time    How much time do you feel that you are awake during the entire night? 0    How long does it take for you to fall back to sleep after you wake up? 2 minutes    Why do you think you wake up? Go to the bathroom    What do you do when you wake up? Have a cig    How much sleep do you think you get on work nights?     How much sleep do you think you get on weekends/non work days?     How much sleep do you think you need to feel your best?     How  many days during a week do you take a nap on average? everyday    What is the average length of your naps? 2 hrs    Do you feel better after taking a nap? YES    If you could chose the best sleep schedule for you, what time would you go to bed? 9 pm  What time would you get up? 10 am    Do you read in bed? NO    Do you eat in bed? YES    Do you watch TV in bed? YES    Do you do work in bed? NO    Do you use a computer or phone in bed? NO    Sleep Disruptions?   Leg movements:  Do you ever have restless, crawling, aching or other unusual feelings in your legs? NO    Do you ever wake yourself by kicking your legs during the night? YES    Are the sheets and blankets messed up or tossed about when you get up? YES    Night-time behaviors:   Do you have nightmares or night terrors? NO   How often?     Have you had times when you were sleep walking? NO    Have you been seen doing anything unusual while you sleep at nights? NO  What?   How often?     Have you ever hurt yourself or someone else while you were sleeping? NO  Please describe:     Do you clench or grind your teeth during the night?     Sleep Apnea (pauses in breathing during sleep):  Do you wake with a headache in the morning? NO  How often?     Does your bed partner, family or friends ever say that you snore? YES  How many nights per week do you snore?   Can snoring be heard outside the bedroom? moderate    Do you ever wake yourself up from snoring, gasping or choking? NO    Have you ever been told that you stop breathing or have pauses in your breathing? NO    Do you wake in the morning with a dry throat or mouth? YES    Do you have trouble breathing through your nose? NO    Do you have problems with heartburn, reflux or a hiatal hernia? NO    Which positions do you usually sleep in? (stomach, back, sides, all) all    Do you use oxygen or any other medical equipment when you sleep? NO    Do members of your family (related by blood) snore? NO    Have any  members of your family been diagnosed with with sleep apnea? NO    Do other members of your family have restless leg? Question not on questionnaire.    Do other members of your family have sleep walking? Question not on questionnaire.    Have you ever had an accident, or near accident due to sleepiness while driving? NO    Does your sleepiness affect your work on the job or at school?     Do you ever fall asleep by accident while doing a task? NO    Have you had sudden muscle weakness when laughing, angry or surprised? YES    Have you ever been unable to move your body when falling asleep or waking up? NO    Do you ever have trouble  your dreams from real life events? NO  Please describe:     Physical Health: (including illness and injury): During the past 30 days, on how many days was your physical health not good? /30 days     Mental Health: (including stress, depression, and problems with emotions): During the last 30 days, how may days was your mental health not good? /30 days.     During the past 30 days, on how many days did poor physical or mental health keep you from doing your usual activities? This might be self-care, work, or play? /30 days.     Social History:   Marital status: single    Who lives in your home with you? My boyfriend Tavo Humphreys    Mother (alive or dead)?  If has , from what?   Father (alive or dead)?  If has , from what?     Siblings: NO  Have any ? DOES NOT APPLY  If so, from what?     Currently working? NO  If yes, work:   Former jobs:      Sleepiness Scale:   Sitting and reading 1   Watching TV 3   Sitting in a public place 0   Riding in a car 2   Lying down to rest in the afternoon 1   Sitting and talking to someone 1   Sitting quietly after a lunch without alcohol 0   In a car, stopping for a few minutes in traffic 1       Surgical History:     Medical Conditions: meds attached    Medications: meds attached    Are you currently having any of the following  symptoms?   General:   Obvious weight gain or loss NO  Fever, chills or sweats YES  Drug allergies: NO    Eyes:   Changes in vision NO  Blind spots NO  Double vision NO  Other     Ear, Nose and Throat:   Ear pain NO  Sore throat NO  Sinus pain NO  Post-nasal drip NO  Runny nose YES  Bloody nose NO    Heart:   Rapid or irregular heart beat NO  Chest pain or pressure NO  Out of breath when lying down NO  Swelling in feet or legs YES  High blood pressure YES  Heart disease NO    Nervous system   Headaches YES  Weakness in arms or legs NO  Numbness in arms of legs NO  Other:     Skin  Rashes NO  New moles or skin changes NO  Other     Lungs  Shortness of breath at rest NO  Shortness of breath with activity NO  Dry cough YES  Coughing up mucous or phlegm NO  Coughing up blood NO  Wheezing when breathing YES    Lymph System  Swollen lymph nodes NO  New lumps or bumps NO  Changes in breasts or discharge NO    Digestive System   Nausea or vomiting NO  Loose or watery stools NO  Hard, dry stools (constipation) NO  Fat or grease in stools NO  Blood in stools NO  Stools are black or bloody NO  Abdominal (belly) pain NO    Urinary Tract   Pain when you urinate (pee) NO  Blood in your urine NO  Urinate (pee) more than normal NO  Irregular periods NO    Muscles and bones   Muscle pain NO  Joint or bone pain NO  Swollen joints NO  Other     Glands  Increased thirst or urination NO  Diabetes YES  Morning glucose:   Afternoon glucose:     Mental Health  Depression NO  Anxiety NO  Other mental health issues: NO

## 2020-11-24 NOTE — PROGRESS NOTES
LURDES PEPPER       Name: Marly Cannon MRN# [id number]   Age: 55 year old YOB: 1965     Stop Bang questionnaire completed with a score of >3 to allow for HST     Have you been told you snore loudly (louder than talking or loud enough to be heard through doors)? NO    Do you often feel tired, fatigued, or sleepy during the daytime? YES    Has anyone observed you stop breathing during your sleep? NO    Do you have or are you being treated for high blood pressure? YES    Is your BMI greater than 35? YES    Is your neck size circumference 16 inches or greater? NO    Are you over 50 years old? YES    Stop Bang Score (# of yes): 4

## 2020-11-24 NOTE — Clinical Note
"Chart ready for review  I called her to get some of the blanks filled   Goes to bed between 8-9 pm and up at 9 am  Her parents are both past from heart   And her complaint is that she sweats too much at night  I do not think that she really has a 4\" neck"

## 2020-11-27 NOTE — PROGRESS NOTES
"Chart review prior to sleep testing.    Patient Summary:  55 year old yo female who is referred for concern for sleep-disordered breathing as part of work-up of elevated hemoglobin.    Pertinent PMHx of morbid obesity, HTN, elevated Hgb, DM II, COPD.    Most recent PFT's on 8/18/2015 consistent with airway obstruction, mild decrease DLCO with suspicion for concurrent restrictive disease.    STOP-BANG score of 4, with unknown neck circumference.  [place] score of 9.  BMI of Estimated body mass index is 34.95 kg/m  as calculated from the following:    Height as of 11/9/20: 1.619 m (5' 3.75\").    Weight as of 11/9/20: 91.6 kg (202 lb).     Per questionnaire: not answered    Caffeine use:  No for 3+ per day.  No for within 6 hours of bed.    Tobacco use: Yes    Sleep pattern:  Pattern questions not answered.  Time to fall asleep: ~120 minutes.  Awakenings: \"all the time\" times per night, 2 minutes to return to sleep.  Napping.  7 days per week, 2 hours per nap.    No for RLS screen.  No for sleep walking.  No for dream enactment behavior.  Not answered for bruxism.    Yes for snoring.  No for observed apnea.  No for FHx of ADEEL.    SHx:  Lives with boyfriend.  Not currently working.    A/P:  1.)  High likelihood of ADEEL with STOP-BANG score of 4.  2.)  Unclear respiratory history with prior PFT's suggestive of mixed obstructive / restrictive disease picture.  3.)  Elevated hemoglobin   - Would appear to be candidate for either home sleep testing or in-lab PSG.    Anupam Duval MD    "

## 2020-11-30 ENCOUNTER — TELEPHONE (OUTPATIENT)
Dept: FAMILY MEDICINE | Facility: OTHER | Age: 55
End: 2020-11-30

## 2020-12-02 ENCOUNTER — ALLIED HEALTH/NURSE VISIT (OUTPATIENT)
Dept: EDUCATION SERVICES | Facility: OTHER | Age: 55
End: 2020-12-02
Attending: NURSE PRACTITIONER
Payer: COMMERCIAL

## 2020-12-02 VITALS
DIASTOLIC BLOOD PRESSURE: 72 MMHG | OXYGEN SATURATION: 93 % | HEIGHT: 64 IN | RESPIRATION RATE: 14 BRPM | HEART RATE: 77 BPM | SYSTOLIC BLOOD PRESSURE: 122 MMHG | BODY MASS INDEX: 34.66 KG/M2 | WEIGHT: 203 LBS

## 2020-12-02 DIAGNOSIS — F17.200 NICOTINE DEPENDENCE, UNCOMPLICATED, UNSPECIFIED NICOTINE PRODUCT TYPE: ICD-10-CM

## 2020-12-02 DIAGNOSIS — Z79.4 TYPE 2 DIABETES MELLITUS WITH HYPERGLYCEMIA, WITH LONG-TERM CURRENT USE OF INSULIN (H): Primary | ICD-10-CM

## 2020-12-02 DIAGNOSIS — E11.65 TYPE 2 DIABETES MELLITUS WITH HYPERGLYCEMIA, WITH LONG-TERM CURRENT USE OF INSULIN (H): Primary | ICD-10-CM

## 2020-12-02 DIAGNOSIS — Z71.6 TOBACCO ABUSE COUNSELING: ICD-10-CM

## 2020-12-02 DIAGNOSIS — F17.210 NICOTINE DEPENDENCE, CIGARETTES, UNCOMPLICATED: ICD-10-CM

## 2020-12-02 PROCEDURE — G0463 HOSPITAL OUTPT CLINIC VISIT: HCPCS

## 2020-12-02 PROCEDURE — 99213 OFFICE O/P EST LOW 20 MIN: CPT | Performed by: NURSE PRACTITIONER

## 2020-12-02 ASSESSMENT — PAIN SCALES - GENERAL: PAINLEVEL: NO PAIN (0)

## 2020-12-02 ASSESSMENT — MIFFLIN-ST. JEOR: SCORE: 1486.83

## 2020-12-02 NOTE — PROGRESS NOTES
SUBJECTIVE:  Marly Cannon, 55 year old, female presents with the following Chief Complaint(s) with HPI to follow:  Chief Complaint   Patient presents with     Follow Up     Diabetes        Diabetes Follow-up    Patient is checking blood sugars: 2x/day.  Results:  Highest: 212  Lowest: 118  Period average: 182       Symptoms of hypoglycemia (low blood sugar): none    Paresthesias (numbness or burning in feet) or sores: No    Diabetic eye exam within the last year: Yes    Breakfast eaten regularly: yes    Patient counting carbs: Yes    Exercising: no          HPI:  Marly's here today for the follow up regarding her Diabetes mellitus, Type 2.    Lab Results   Component Value Date    A1C 7.6 11/09/2020    A1C 7.9 08/07/2020    A1C 8.1 05/04/2020    A1C Test canceled by physician 02/04/2020    A1C 7.9 01/15/2020     Current Diabetes medication:   1. Vgo--Novolog, 2 clicks with food  2. Ozempic 1 mg weekly (Tuesday)  Statin use: yes, simvastatin 20 mg daily  ASA: yes, 81 mg daily    Marly's here today for follow up regarding her diabetes.    Reports the following:  Denies any issues with her V-go.   During Thanksgiving, she had the stomach flu.  States she's feeling better    Had an appt with Amberly Whyte NP in November.   She's suppose to have a sleep study but hasn't heard anything      Patient Active Problem List   Diagnosis     Type 2 diabetes, HbA1c goal < 7% (H)     ACP (advance care planning)     Stage 3 chronic kidney disease     Pulmonary emphysema (H)     Hyperparathyroidism due to renal insufficiency (H)     Morbid obesity (H)     Vitamin D deficiency     Intellectual disability     Breast fibrocystic disorder     Tobacco abuse     Microalbuminuria due to type 2 diabetes mellitus (H)     H/O colonoscopy     Hull cardiac risk <10% in next 10 years     Tubular adenoma of colon     Hyperlipidemia, unspecified hyperlipidemia type     Essential hypertension     Callus of foot     Memory disturbance        No past medical history on file.    Past Surgical History:   Procedure Laterality Date     COLONOSCOPY N/A 8/20/2015    Procedure: COLONOSCOPY;  Surgeon: Gentry Porter MD;  Location: [facility]     COLONOSCOPY N/A 9/21/2018    Procedure: COLONOSCOPY;  COLONOSCOPY WITH POLYPECTOMY;  Surgeon: Gentry Porter MD;  Location: [facility]       Family History   Problem Relation Age of Onset     Diabetes Mother      Diabetes Father      Hypertension Brother      Diabetes Sister        Social History     Tobacco Use     Smoking status: Current Every Day Smoker     Packs/day: 1.00     Years: 34.00     Pack years: 34.00     Types: Cigarettes     Start date: 1/1/1979     Smokeless tobacco: Never Used     Tobacco comment: Declined QP 05/13/2020   Substance Use Topics     Alcohol use: No     Alcohol/week: 0.0 standard drinks       Current Outpatient Medications   Medication Sig Dispense Refill     Acetaminophen (TYLENOL PO) Take 325 mg by mouth every 6 hours as needed        amLODIPine (NORVASC) 5 MG tablet Take 1 tablet (5 mg) by mouth daily 90 tablet 3     ammonium lactate (LAC-HYDRIN) 12 % cream Apply topically 2 times daily       aspirin (ASPIRIN LOW DOSE) 81 MG chewable tablet CHEW 1 TABLET BY MOUTH DAILY. DO NOT SPLIT OR CRUSH 90 tablet 3     atorvastatin (LIPITOR) 40 MG tablet Take 1 tablet (40 mg) by mouth At Bedtime 90 tablet 3     budesonide-formoterol (SYMBICORT) 160-4.5 MCG/ACT Inhaler Inhale 2 puffs into the lungs 2 times daily 1 Inhaler 3     Cholecalciferol (VITAMIN D3) 50 MCG (2000 UT) CAPS TAKE 1 CAPSULE BY MOUTH DAILY 100 capsule 0     COMBIVENT RESPIMAT  MCG/ACT inhaler INHALE 1 PUFF INTO THE LUNGS 4 TIMES DAILY 4 g 0     docusate sodium (COLACE) 100 MG capsule Take 1 capsule (100 mg) by mouth daily 90 capsule 1     hydrocortisone (CORTAID) 1 % external cream Apply topically 2 times daily 453.6 g 0     hydrocortisone 2.5 % cream Apply topically 2 times daily       insulin aspart (NOVOLOG VIAL) 100  "UNITS/ML vial To be used with the V20.  TDD: 56 20 mL 6     insulin pen needle (32G X 4 MM) 32G X 4 MM miscellaneous Use 1 pen needles daily 100 each 3     insulin syringes, disposable, (BD INSULIN SYRINGE) U-100 1 ML MISC 1 Device daily 30 each 2     lisinopril (ZESTRIL) 40 MG tablet Take 1 tablet (40 mg) by mouth daily 30 tablet 5     Magnesium Cl-Calcium Carbonate 71.5-119 MG TBEC Take 2 tablets by mouth       nystatin (MYCOSTATIN) 101371 UNIT/GM external powder Apply topically 3 times daily 60 g 3     ONETOUCH DELICA LANCETS 33G MISC 1 each 3 times daily (Patient taking differently: 1 each 4 times daily ) 100 each 10     ONETOUCH ULTRA test strip TEST BLOOD SUGARS FOUR TIMES A DAY DAILY OR AS DIRECTED 100 strip 1     order for DME Women briefs 50 each 1     polyethylene glycol (MIRALAX) 17 g packet Take 17 g by mouth 3 times daily       primidone (MYSOLINE) 50 MG tablet TAKE 1 TABLET BY MOUTH AT BEDTIME 30 tablet 11     semaglutide (OZEMPIC, 1 MG/DOSE,) 2 MG/1.5ML pen Inject 1 mg Subcutaneous every 7 days 6 mL 3     triamcinolone (KENALOG) 0.1 % cream Apply topically 2 times daily       wearable insulin delivery device ([employer] 20) kit Insulin delivery device to be changed every 24 hours 30 each 6       No Known Allergies    REVIEW OF SYSTEMS  Skin: negative  Eyes: negative   Ears/Nose/Throat: glasses; negative  Respiratory: No shortness of breath or hemoptysis; smoker's cough   Cardiovascular: negative  Gastrointestinal: negative--recent stomach flu  Genitourinary: negative  Musculoskeletal: left knee pain--\"good\"--wearing her knee brace    Neurologic: negative  Psychiatric: negative  Hematologic/Lymphatic/Immunologic: continued  Endocrine: positive for diabetes     OBJECTIVE:  /72 (BP Location: Left arm, Patient Position: Sitting, Cuff Size: Adult Large)   Pulse 77   Resp 14   Ht 1.619 m (5' 3.75\")   Wt 92.1 kg (203 lb)   SpO2 93%   BMI 35.12 kg/m    Constitutional: healthy, alert and no " "distress  Musculoskeletal: extremities normal- no gross deformities noted and gait normal  Skin: no suspicious lesions or rashes to visible skin  Psychiatric: mentation appears normal and affect normal/bright    LABS  No results found for any visits on 12/02/20.    ASSESSMENT / PLAN:  (E11.65,  Z79.4) Type 2 diabetes mellitus with hyperglycemia, with long-term current use of insulin (H)  (primary encounter diagnosis)  Comment: noted A1c  Plan: wearable insulin delivery device (V-GO 20) kit          Reminded Marly to start adding 3 clicks before supper and 4 clicks before cereal    (F17.200) Nicotine dependence, uncomplicated, unspecified nicotine product type  Comment: noted, refused  Plan:     Marly's aware to let us know when she's ready to quit smoking.  Discussed the dangers of smoking with diabetes    (F17.210) Nicotine dependence, cigarettes, uncomplicated   Comment: noted, refused  Plan:     Marly's aware to let us know when she's ready to quit smoking.  Discussed the dangers of smoking with diabetes    (Z71.6) Tobacco abuse counseling  Comment: noted  Plan:   Marly told me \"not yet\".  Keep working on reducing the amount smoked      Follow up   1 month    JOON Aguilar will help Marly regarding the sleep study    Call with questions/concerns      Patient Instructions   Continue working on healthy eating and moving (start low and slow, work up to 30 min, 5x/week)    BG goals:  Fasting and before meals <130, >70  2 hour after eating <180    We only need 1/2 of these numbers to be within target then your A1c will be within target    Medication changes   Breakfast/lunch: 2 clicks   Supper: 3 clicks  Cereal: 4 clicks    Follow up   1 month    Call me sooner if any problems/concerns and/or questions develop including consistent low BGs <70 or consistent high BGs >200  [phone] (Unit Coordinator)    [phone] (Nurse)        Time: 30 minutes  Barrier: see PMH  Willingness to learn: accepting    Courtney " DONA PINTO [facility]  Disease Management    Cc: Amberly Whyte NP    With the electronic record, we can now more quickly and easily track our patient diabetic goals. Our diabetes clinical review is in progress and these are the indicators we are monitoring for good diabetes health.     1.) HbA1C less than 7 (measurement of your average blood sugars)  2.) Blood Pressure less than 140/80  3.) LDL less than 100 (bad cholesterol)  4.) HbA1C is checked in the last 6 months and below 7% (more frequently if not at goal or adjusting medications)  5.) LDL is checked in the last 12 months (more frequently if not at goal or adjusting medications)  6.) Taking one baby aspirin daily (unless otherwise instructed)  7.) No tobacco use  8) Statin use     You have achieved 6 out of 8 of these and I am encouraging you to come in and get tuned up to achieve 8 out of 8!  Here is what you have achieved so far in my goals for you:  1.) HbA1C  less than 7:                              NO  Your last  HbA1C :  Lab Results   Component Value Date    A1C 7.6 11/09/2020    A1C 7.9 08/07/2020    A1C 8.1 05/04/2020    A1C Test canceled by physician 02/04/2020    A1C 7.9 01/15/2020     2.) Blood Pressure less than 140/80:       YES      Your last    BP Readings from Last 1 Encounters:   12/02/20 122/72     3.) LDL less than 100:                              YES      Your last     LDL Cholesterol Calculated   Date Value Ref Range Status   11/09/2020 74 <100 mg/dL Final     Comment:     Desirable:       <100 mg/dl       4.) Checked HbA1C in the past 6 months: YES      5.) Checked LDL in the past 12 months:    YES      6.) Taking one aspirin daily:                       YES     7.) No tobacco use:                                        NO   8.) Statin use      YES

## 2020-12-02 NOTE — PATIENT INSTRUCTIONS
Continue working on healthy eating and moving (start low and slow, work up to 30 min, 5x/week)    BG goals:  Fasting and before meals <130, >70  2 hour after eating <180    We only need 1/2 of these numbers to be within target then your A1c will be within target    Medication changes   Breakfast/lunch: 2 clicks   Supper: 3 clicks  Cereal: 4 clicks    Follow up   1 month    Call me sooner if any problems/concerns and/or questions develop including consistent low BGs <70 or consistent high BGs >200  [phone] (Unit Coordinator)    [phone] (Nurse)

## 2020-12-07 ENCOUNTER — TELEPHONE (OUTPATIENT)
Dept: EDUCATION SERVICES | Facility: HOSPITAL | Age: 55
End: 2020-12-07

## 2020-12-07 DIAGNOSIS — E11.65 TYPE 2 DIABETES MELLITUS WITH HYPERGLYCEMIA, WITH LONG-TERM CURRENT USE OF INSULIN (H): ICD-10-CM

## 2020-12-07 DIAGNOSIS — Z79.4 TYPE 2 DIABETES MELLITUS WITH HYPERGLYCEMIA, WITH LONG-TERM CURRENT USE OF INSULIN (H): ICD-10-CM

## 2020-12-07 RX ORDER — BLOOD SUGAR DIAGNOSTIC
STRIP MISCELLANEOUS
Qty: 150 STRIP | Refills: 3 | Status: SHIPPED | OUTPATIENT
Start: 2020-12-07 | End: 2022-05-03

## 2020-12-16 ENCOUNTER — THERAPY VISIT (OUTPATIENT)
Dept: SLEEP MEDICINE | Facility: HOSPITAL | Age: 55
End: 2020-12-16
Attending: FAMILY MEDICINE
Payer: COMMERCIAL

## 2020-12-16 DIAGNOSIS — I10 ESSENTIAL HYPERTENSION: ICD-10-CM

## 2020-12-16 DIAGNOSIS — N18.30 STAGE 3 CHRONIC KIDNEY DISEASE, UNSPECIFIED WHETHER STAGE 3A OR 3B CKD (H): ICD-10-CM

## 2020-12-16 DIAGNOSIS — E66.01 MORBID OBESITY (H): ICD-10-CM

## 2020-12-16 DIAGNOSIS — E11.9 TYPE 2 DIABETES, HBA1C GOAL < 7% (H): ICD-10-CM

## 2020-12-16 PROCEDURE — 95810 POLYSOM 6/> YRS 4/> PARAM: CPT

## 2020-12-16 PROCEDURE — 95810 POLYSOM 6/> YRS 4/> PARAM: CPT | Mod: 26 | Performed by: FAMILY MEDICINE

## 2020-12-17 NOTE — PROGRESS NOTES
STUDY TYPE/INSURANCE:  PSG/UCARE  SLEEP AID TYPE/TIME:  NA  PAP ACCLIMATION:  NA  RESPIRATORY EVENTS (BASELINE):>30  SNORING:MODERATE TO HEAVY  TCO2 MONITORING AND ABGS:  NO  TITRATION:NA  LEAK RATE:  NA  HOB ELEVATION:FLAT WITH ONE PILLOW  FINAL MASK TYPE/SIZE:  NA  SLEEP STAGES:  1,2,3,REM  ECG:  NORMAL SINUS  MOVEMENTS/BEHAVIORS:  NORMAL PATTERN  CURRENTLY ON TX OR HAVE EQUIPMENT:  NA

## 2020-12-17 NOTE — NURSING NOTE
Patient is here with a complaint of snoring. Patient had very restless sleep. She had 7 hours of sleep with a sleep efficiency of 83%. Her Spo2 was below 88% for 365 minutes and the low SPO2 was 78% and respiratory events with an index of 43, and loud snoring an snorts. Patient tolerated test well.

## 2020-12-18 ENCOUNTER — PATIENT OUTREACH (OUTPATIENT)
Dept: CARE COORDINATION | Facility: OTHER | Age: 55
End: 2020-12-18

## 2020-12-18 ASSESSMENT — ACTIVITIES OF DAILY LIVING (ADL): DEPENDENT_IADLS:: INDEPENDENT

## 2020-12-18 NOTE — PROGRESS NOTES
Clinic Care Coordination Contact    Situation: Patient chart reviewed by care coordinator.    Background: Reviewed Oct and Dec DM Ed visit notes and Nov PCP visit note for care plan updates.    Assessment: Pt is up to date with necessary follow-ups and has future appts scheduled as follows:    -12/22/20 sleep med follow-up Dr Duval  -1/6/21 DM follow up Courtney Chaudhary NP    Next 5 appointments (look out 90 days)    Feb 10, 2021  9:30 AM  (Arrive by 9:15 AM)  [facility] [facility] [address]  [phone]         * Reviewed 11/9/20 lab results and recommendations  A1C 7.6    * Sleep Study completed 12/16/20    * Last visit with Dr Eason was 9/11/20    *Pt continues to have home nursing- nurse is Molly with [facility]    Plan/Recommendations: CC will continue to monitor.    Lauren Pipkin, BS-RN   Care Coordination  Primary Care- [facility]  [phone] Option # 1

## 2020-12-22 ENCOUNTER — VIRTUAL VISIT (OUTPATIENT)
Dept: SLEEP MEDICINE | Facility: HOSPITAL | Age: 55
End: 2020-12-22
Attending: FAMILY MEDICINE
Payer: COMMERCIAL

## 2020-12-22 DIAGNOSIS — I10 ESSENTIAL HYPERTENSION: ICD-10-CM

## 2020-12-22 DIAGNOSIS — G47.34 NOCTURNAL HYPOXEMIA: ICD-10-CM

## 2020-12-22 DIAGNOSIS — E11.9 TYPE 2 DIABETES, HBA1C GOAL < 7% (H): Primary | ICD-10-CM

## 2020-12-22 DIAGNOSIS — E66.01 MORBID OBESITY (H): ICD-10-CM

## 2020-12-22 DIAGNOSIS — N18.30 STAGE 3 CHRONIC KIDNEY DISEASE, UNSPECIFIED WHETHER STAGE 3A OR 3B CKD (H): ICD-10-CM

## 2020-12-22 DIAGNOSIS — R06.89 HYPOVENTILATION: ICD-10-CM

## 2020-12-22 DIAGNOSIS — G47.33 OSA (OBSTRUCTIVE SLEEP APNEA): ICD-10-CM

## 2020-12-22 PROCEDURE — 99214 OFFICE O/P EST MOD 30 MIN: CPT | Mod: 95 | Performed by: FAMILY MEDICINE

## 2020-12-28 NOTE — PROCEDURES
"Patient summary:  55 year old yo female who is referred for concern for sleep-disordered breathing as part of work-up of elevated hemoglobin.     Pertinent PMHx of morbid obesity, HTN, elevated Hgb, DM II, COPD.     Most recent PFT's on 8/18/2015 consistent with airway obstruction, mild decrease DLCO with suspicion for concurrent restrictive disease.     Results for MATTHEW STEEN (MRN [id number]) as of 12/28/2020 10:27    Ref. Range 12/3/2016 16:48 9/21/2018 09:23 7/1/2019 11:30 5/21/2020 13:35 11/9/2020 08:42   Hemoglobin Latest Ref Range: 11.7 - 15.7 g/dL 16.6 (H) 17.9 (H) 16.9 (H) 16.1 (H) 16.7 (H)         STOP-BANG score of 4, with unknown neck circumference.  [place] score of 9.  BMI of Estimated body mass index is 34.95 kg/m  as calculated from the following:    Height as of 11/9/20: 1.619 m (5' 3.75\").    Weight as of 11/9/20: 91.6 kg (202 lb).      Per questionnaire: not answered     Caffeine use:  No for 3+ per day.  No for within 6 hours of bed.     Tobacco use: Yes      Interp for PSG dated 12/16/2020.    Weight 202 lbs.  Total sleep time 417.5 minutes, sleep efficiency 83%, sleep latency 15 minutes, REM latency - minutes.  Arousal index 45.6.  Sleep architecture was incredibly fragmented with no clear REM observed.    AHI 43.1, events appearing primarily obstructive in nature.  Mean Spo2 86%, isabel SpO2 78%, 96.2% of recording was <= 89%.      EKG appeared NSR.    No PLM's observed.     Unable to assess for phasic or tonic EMG activity in REM.  No abnormal nocturnal behaviors observed.    Assessment:  - Severe ADEEL with severe sleep-associated hypoxemia and sustained hypoxemia, with increased concern for hypoventilation.  - Potential that severity under-reported given highly fragmented sleep architecture and no clear REM observed.    Recommendations:  - Typically, we would proceed with an urgent all-night Pap titration with likely need for bilevel and possibly nocturnal supplemental oxygen.  This is " currently not possible due to COVID-19 precautions and restrictions.  -Given the sustained hypoxemia and severe nature of her obstructive sleep apnea, we will plan to proceed with bilevel Pap auto titrate with minimum EPAP 5, maximum IPAP 20, pressure support 7 on room air.  -If bilevel as tolerated and AHI is below 10, then plan to proceed with a follow-up overnight oximetry on treatment.  -Would also recommend repeat full pulmonary function test..  - Recommend weight management.    ---  This note was written with the assistance of the Dragon voice-dictation technology software. The final document, although reviewed, may contain errors. For corrections, please contact the office.    Anupam Duval MD    Sleep Medicine  [facility]  ([phone])  [facility]  ([phone])

## 2020-12-28 NOTE — PROGRESS NOTES
"Marly Cannon is a 55 year old female who is being evaluated via a billable telephone visit.      The patient has been notified of following:     \"This telephone visit will be conducted via a call between you and your physician/provider. We have found that certain health care needs can be provided without the need for a physical exam.  This service lets us provide the care you need with a short phone conversation.  If a prescription is necessary we can send it directly to your pharmacy.  If lab work is needed we can place an order for that and you can then stop by our lab to have the test done at a later time.    Telephone visits are billed at different rates depending on your insurance coverage. During this emergency period, for some insurers they may be billed the same as an in-person visit.  Please reach out to your insurance provider with any questions.    If during the course of the call the physician/provider feels a telephone visit is not appropriate, you will not be charged for this service.\"    Patient has given verbal consent for Telephone visit?  Yes    What phone number would you like to be contacted at? [phone]    How would you like to obtain your AVS? Mail a copy    Phone call duration: 30 minutes    Virtual visit for review of sleep testing results.    Assessment:  -Severe obstructive sleep apnea with sustained hypoxemia and concern for hypoventilation  -Comorbid hypertension, type 2 diabetes, polycythemia, unclear pulmonary disorder.  -Prior pulmonary function test in 2015 consistent or suggestive of a combination of obstructive and restrictive disease.  -This level of hypoxemia could clearly be a driving cause of her elevated hemoglobin.    Plan:  -Typically, we would proceed with an urgent all-night Pap titration with likely need for bilevel and possibly nocturnal supplemental oxygen.  This is currently not possible due to COVID-19 precautions and restrictions.  -Given the sustained " "hypoxemia and severe nature of her obstructive sleep apnea, we will plan to proceed with bilevel Pap auto titrate with minimum EPAP 5, maximum IPAP 20, pressure support 7 on room air.  -If bilevel as tolerated and AHI is below 10, then plan to proceed with a follow-up overnight oximetry on treatment.  -Would also recommend repeat full pulmonary function test.  -Once SPO2 is normalized, would recommend monitoring a repeat hemoglobin in approximately 3 months.  If this continues to be elevated, then I would recommend a repeat full CBC with platelets and differential, peripheral smear, carbon monoxide level, JAK2 mutation and based on the results likely a consultation to hematology.    55 year old yo female who is referred for concern for sleep-disordered breathing as part of work-up of elevated hemoglobin.     Pertinent PMHx of morbid obesity, HTN, elevated Hgb, DM II, COPD.     Most recent PFT's on 8/18/2015 consistent with airway obstruction, mild decrease DLCO with suspicion for concurrent restrictive disease.    Results for MATTHEW STEEN (MRN [id number]) as of 12/28/2020 10:27   Ref. Range 12/3/2016 16:48 9/21/2018 09:23 7/1/2019 11:30 5/21/2020 13:35 11/9/2020 08:42   Hemoglobin Latest Ref Range: 11.7 - 15.7 g/dL 16.6 (H) 17.9 (H) 16.9 (H) 16.1 (H) 16.7 (H)        STOP-BANG score of 4, with unknown neck circumference.  [place] score of 9.  BMI of Estimated body mass index is 34.95 kg/m  as calculated from the following:    Height as of 11/9/20: 1.619 m (5' 3.75\").    Weight as of 11/9/20: 91.6 kg (202 lb).      Per questionnaire: not answered     Caffeine use:  No for 3+ per day.  No for within 6 hours of bed.     Tobacco use: Yes     Sleep pattern:  Pattern questions not answered.  Time to fall asleep: ~120 minutes.  Awakenings: \"all the time\" times per night, 2 minutes to return to sleep.  Napping.  7 days per week, 2 hours per nap.     No for RLS screen.  No for sleep walking.  No for dream enactment " behavior.  Not answered for bruxism.     Yes for snoring.  No for observed apnea.  No for FHx of ADEEL.     SHx:  Lives with boyfriend.  Not currently working.    Interp for PSG dated 12/16/2020.     Weight 202 lbs.  Total sleep time 417.5 minutes, sleep efficiency 83%, sleep latency 15 minutes, REM latency - minutes.  Arousal index 45.6.  Sleep architecture was incredibly fragmented with no clear REM observed.     AHI 43.1, events appearing primarily obstructive in nature.  Mean Spo2 86%, isabel SpO2 78%, 96.2% of recording was <= 89%.       EKG appeared NSR.     No PLM's observed.      Unable to assess for phasic or tonic EMG activity in REM.  No abnormal nocturnal behaviors observed.    ---  This note was written with the assistance of the Dragon voice-dictation technology software. The final document, although reviewed, may contain errors. For corrections, please contact the office.    Anupam Duval MD    Sleep Medicine  [facility]  ([phone])  [facility]  ([phone])

## 2021-01-06 ENCOUNTER — TELEPHONE (OUTPATIENT)
Dept: SLEEP MEDICINE | Facility: HOSPITAL | Age: 56
End: 2021-01-06

## 2021-01-06 ENCOUNTER — ALLIED HEALTH/NURSE VISIT (OUTPATIENT)
Dept: EDUCATION SERVICES | Facility: OTHER | Age: 56
End: 2021-01-06
Attending: NURSE PRACTITIONER
Payer: COMMERCIAL

## 2021-01-06 ENCOUNTER — PATIENT OUTREACH (OUTPATIENT)
Dept: CARE COORDINATION | Facility: OTHER | Age: 56
End: 2021-01-06

## 2021-01-06 VITALS
DIASTOLIC BLOOD PRESSURE: 82 MMHG | WEIGHT: 206.4 LBS | HEIGHT: 64 IN | OXYGEN SATURATION: 93 % | HEART RATE: 88 BPM | TEMPERATURE: 98.6 F | SYSTOLIC BLOOD PRESSURE: 120 MMHG | BODY MASS INDEX: 35.24 KG/M2

## 2021-01-06 DIAGNOSIS — Z72.0 TOBACCO ABUSE: ICD-10-CM

## 2021-01-06 DIAGNOSIS — Z79.4 TYPE 2 DIABETES MELLITUS WITH HYPERGLYCEMIA, WITH LONG-TERM CURRENT USE OF INSULIN (H): Primary | ICD-10-CM

## 2021-01-06 DIAGNOSIS — F17.210 NICOTINE DEPENDENCE, CIGARETTES, UNCOMPLICATED: ICD-10-CM

## 2021-01-06 DIAGNOSIS — E11.65 TYPE 2 DIABETES MELLITUS WITH HYPERGLYCEMIA, WITH LONG-TERM CURRENT USE OF INSULIN (H): Primary | ICD-10-CM

## 2021-01-06 PROCEDURE — 99213 OFFICE O/P EST LOW 20 MIN: CPT | Performed by: NURSE PRACTITIONER

## 2021-01-06 PROCEDURE — G0463 HOSPITAL OUTPT CLINIC VISIT: HCPCS

## 2021-01-06 RX ORDER — SEMAGLUTIDE 1.34 MG/ML
1 INJECTION, SOLUTION SUBCUTANEOUS
Qty: 9 ML | Refills: 1 | Status: SHIPPED | OUTPATIENT
Start: 2021-01-06 | End: 2021-10-25 | Stop reason: ALTCHOICE

## 2021-01-06 ASSESSMENT — MIFFLIN-ST. JEOR: SCORE: 1506.22

## 2021-01-06 ASSESSMENT — PAIN SCALES - GENERAL: PAINLEVEL: NO PAIN (0)

## 2021-01-06 ASSESSMENT — ACTIVITIES OF DAILY LIVING (ADL): DEPENDENT_IADLS:: INDEPENDENT

## 2021-01-06 NOTE — TELEPHONE ENCOUNTER
Received order at [facility] for Bipap.  I have talked to pt.   We are going to coordinate a delivery method, as she states she has no way to come here to  a machine.

## 2021-01-06 NOTE — PATIENT INSTRUCTIONS
Continue working on healthy eating and moving (start low and slow, work up to 30 min, 5x/week)    BG goals:  Fasting and before meals <130, >70  2 hour after eating <180    We only need 1/2 of these numbers to be within target then your A1c will be within target    Medication changes   4 clicks with meals    Follow up   Download when you see Amberly    Call me sooner if any problems/concerns and/or questions develop including consistent low BGs <70 or consistent high BGs >200  [phone] (Unit Coordinator)    [phone] (Nurse)    Will ask about   Sleep apnea test results    Try to reduce smoking to 18/day  Then try 16/day

## 2021-01-06 NOTE — PROGRESS NOTES
"Chief Complaint   Patient presents with     Diabetes Education       Initial /82   Pulse 88   Temp 98.6  F (37  C) (Tympanic)   Ht 1.626 m (5' 4\")   Wt 93.6 kg (206 lb 6.4 oz)   SpO2 93%   BMI 35.43 kg/m   Estimated body mass index is 35.43 kg/m  as calculated from the following:    Height as of this encounter: 1.626 m (5' 4\").    Weight as of this encounter: 93.6 kg (206 lb 6.4 oz).  Medication Reconciliation: complete  Danielle Douglas LPN    "

## 2021-01-06 NOTE — PROGRESS NOTES
SUBJECTIVE:  Marly Cannon, 55 year old, female presents with the following Chief Complaint(s) with HPI to follow:  Chief Complaint   Patient presents with     Diabetes Education        Diabetes Follow-up    Patient is checking blood sugars: 2x/day.  Results:  Highest: 432  Lowest: 198  Period average: 290      Symptoms of hypoglycemia (low blood sugar): none    Paresthesias (numbness or burning in feet) or sores: No    Diabetic eye exam within the last year: Yes    Breakfast eaten regularly: yes    Patient counting carbs: Yes    Exercising: no          HPI:  Marly's here today for the follow up regarding her Diabetes mellitus, Type 2.    Lab Results   Component Value Date    A1C 7.6 11/09/2020    A1C 7.9 08/07/2020    A1C 8.1 05/04/2020    A1C Test canceled by physician 02/04/2020    A1C 7.9 01/15/2020     Current Diabetes medication:   1. Vgo--Novolog, 3 clicks with food  2. Ozempic 1 mg weekly (Tuesday)  Statin use: yes, simvastatin 20 mg daily  ASA: yes, 81 mg daily    Faisals here today for follow up regarding her diabetes.    Reports the following:  Denies any issues with her V-go.   Had a sleep apnea study done in December.   States she got up to use the bathroom around 7 am and the person told her she could go home.   That day, she got sick--nausea and vomiting--the whole day.   She's feeling better now  Nobody else got it.   She thought it was from them not feeding her breakfast.   She didn't check a BG during this time.   Hasn't heard anything regarding the plan.     Denies any new health concerns    Has an appt with Amberly BARNES NP in February     Patient Active Problem List   Diagnosis     Type 2 diabetes, HbA1c goal < 7% (H)     ACP (advance care planning)     Stage 3 chronic kidney disease     Pulmonary emphysema (H)     Hyperparathyroidism due to renal insufficiency (H)     Morbid obesity (H)     Vitamin D deficiency     Intellectual disability     Breast fibrocystic disorder     Tobacco abuse      Microalbuminuria due to type 2 diabetes mellitus (H)     H/O colonoscopy     [place] cardiac risk <10% in next 10 years     Tubular adenoma of colon     Hyperlipidemia, unspecified hyperlipidemia type     Essential hypertension     Callus of foot     Memory disturbance       History reviewed. No pertinent past medical history.    Past Surgical History:   Procedure Laterality Date     COLONOSCOPY N/A 8/20/2015    Procedure: COLONOSCOPY;  Surgeon: Gentry Porter MD;  Location: [facility]     COLONOSCOPY N/A 9/21/2018    Procedure: COLONOSCOPY;  COLONOSCOPY WITH POLYPECTOMY;  Surgeon: Gentry Porter MD;  Location: [facility]       Family History   Problem Relation Age of Onset     Diabetes Mother      Diabetes Father      Hypertension Brother      Diabetes Sister        Social History     Tobacco Use     Smoking status: Current Every Day Smoker     Packs/day: 1.00     Years: 34.00     Pack years: 34.00     Types: Cigarettes     Start date: 1/1/1979     Smokeless tobacco: Never Used     Tobacco comment: Declined QP 05/13/2020   Substance Use Topics     Alcohol use: No     Alcohol/week: 0.0 standard drinks       Current Outpatient Medications   Medication Sig Dispense Refill     Acetaminophen (TYLENOL PO) Take 325 mg by mouth every 6 hours as needed        amLODIPine (NORVASC) 5 MG tablet Take 1 tablet (5 mg) by mouth daily 90 tablet 3     ammonium lactate (LAC-HYDRIN) 12 % cream Apply topically 2 times daily       aspirin (ASPIRIN LOW DOSE) 81 MG chewable tablet CHEW 1 TABLET BY MOUTH DAILY. DO NOT SPLIT OR CRUSH 90 tablet 3     atorvastatin (LIPITOR) 40 MG tablet Take 1 tablet (40 mg) by mouth At Bedtime 90 tablet 3     blood glucose (ONETOUCH ULTRA) test strip Use to test blood sugar 4 times daily 150 strip 3     budesonide-formoterol (SYMBICORT) 160-4.5 MCG/ACT Inhaler Inhale 2 puffs into the lungs 2 times daily 1 Inhaler 3     Cholecalciferol (VITAMIN D3) 50 MCG (2000 UT) CAPS TAKE 1 CAPSULE BY MOUTH DAILY 100  "capsule 0     COMBIVENT RESPIMAT  MCG/ACT inhaler INHALE 1 PUFF INTO THE LUNGS 4 TIMES DAILY 4 g 0     docusate sodium (COLACE) 100 MG capsule Take 1 capsule (100 mg) by mouth daily 90 capsule 1     hydrocortisone (CORTAID) 1 % external cream Apply topically 2 times daily 453.6 g 0     hydrocortisone 2.5 % cream Apply topically 2 times daily       insulin aspart (NOVOLOG VIAL) 100 UNITS/ML vial To be used with the V20.  TDD: 56 20 mL 6     insulin pen needle (32G X 4 MM) 32G X 4 MM miscellaneous Use 1 pen needles daily 100 each 3     insulin syringes, disposable, (BD INSULIN SYRINGE) U-100 1 ML MISC 1 Device daily 30 each 2     lisinopril (ZESTRIL) 40 MG tablet Take 1 tablet (40 mg) by mouth daily 30 tablet 5     Magnesium Cl-Calcium Carbonate 71.5-119 MG TBEC Take 2 tablets by mouth       nystatin (MYCOSTATIN) 756937 UNIT/GM external powder Apply topically 3 times daily 60 g 3     ONETOUCH DELICA LANCETS 33G MISC 1 each 3 times daily (Patient taking differently: 1 each 4 times daily ) 100 each 10     order for DME Women briefs 50 each 1     polyethylene glycol (MIRALAX) 17 g packet Take 17 g by mouth 3 times daily       primidone (MYSOLINE) 50 MG tablet TAKE 1 TABLET BY MOUTH AT BEDTIME 30 tablet 11     semaglutide (OZEMPIC, 1 MG/DOSE,) 2 MG/1.5ML pen Inject 1 mg Subcutaneous every 7 days 9 mL 1     triamcinolone (KENALOG) 0.1 % cream Apply topically 2 times daily       wearable insulin delivery device ([employer] 20) kit Insulin delivery device to be changed every 24 hours 30 each 6       No Known Allergies    REVIEW OF SYSTEMS  Skin: negative  Eyes: negative   Ears/Nose/Throat: glasses; negative  Respiratory: No shortness of breath or hemoptysis; smoker's cough; recent sleep apnea test    Cardiovascular: negative  Gastrointestinal: negative  Genitourinary: negative  Musculoskeletal: left knee pain--\"good\"--wearing her knee brace    Neurologic: numbness and tingling in legs  Psychiatric: " "negative  Hematologic/Lymphatic/Immunologic: continued  Endocrine: positive for diabetes     OBJECTIVE:  /82   Pulse 88   Temp 98.6  F (37  C) (Tympanic)   Ht 1.626 m (5' 4\")   Wt 93.6 kg (206 lb 6.4 oz)   SpO2 93%   BMI 35.43 kg/m    Constitutional: healthy, alert and no distress  Musculoskeletal: extremities normal- no gross deformities noted and gait normal  Skin: no suspicious lesions or rashes to visible skin  Psychiatric: mentation appears normal and affect normal/bright    LABS  No results found for any visits on 01/06/21.    ASSESSMENT / PLAN:  (E11.65,  Z79.4) Type 2 diabetes mellitus with hyperglycemia, with long-term current use of insulin (H)  (primary encounter diagnosis)  Comment: noted average  Plan: semaglutide (OZEMPIC, 1 MG/DOSE,) 2 MG/1.5ML         pen          Will have her do 4 clicks during meals.     If continued high BGs (we will have her download at Amberly's appt), might need to increase her to the next Vgo    (Z72.0) Tobacco abuse  Comment: noted and refused  Plan:   Marly's aware to let us know when she's ready to quit smoking.  Discussed the dangers of smoking with diabetes    Asked if she could cut down to 18 cigs/day and then 16 cigs/day    States she will try    (F17.210) Nicotine dependence, cigarettes, uncomplicated   Comment: noted  Plan:   As stated above    As for her feeling ill after the sleep apnea study, I'm not sure what happened.   Encouraged her to check her BG when she has symptoms.      Spoke to Josie KABA RN Care Coordinator about Marly's sleep study and hasn't heard a plan    Follow up   Download when you see Amberly BARNES NP    Call with questions/concerns      Patient Instructions   Continue working on healthy eating and moving (start low and slow, work up to 30 min, 5x/week)    BG goals:  Fasting and before meals <130, >70  2 hour after eating <180    We only need 1/2 of these numbers to be within target then your A1c will be within target    Medication " changes   4 clicks with meals    Follow up   Download when you see Amberly    Call me sooner if any problems/concerns and/or questions develop including consistent low BGs <70 or consistent high BGs >200  [phone] (Unit Coordinator)    [phone] (Nurse)    Will ask about   Sleep apnea test results    Try to reduce smoking to 18/day  Then try 16/day    Time: 30 minutes  Barrier: see PMH  Willingness to learn: accepting    Courtney PINTO [facility]  Disease Management    Cc: Amberly Whyte NP    With the electronic record, we can now more quickly and easily track our patient diabetic goals. Our diabetes clinical review is in progress and these are the indicators we are monitoring for good diabetes health.     1.) HbA1C less than 7 (measurement of your average blood sugars)  2.) Blood Pressure less than 140/80  3.) LDL less than 100 (bad cholesterol)  4.) HbA1C is checked in the last 6 months and below 7% (more frequently if not at goal or adjusting medications)  5.) LDL is checked in the last 12 months (more frequently if not at goal or adjusting medications)  6.) Taking one baby aspirin daily (unless otherwise instructed)  7.) No tobacco use  8) Statin use     You have achieved 6 out of 8 of these and I am encouraging you to come in and get tuned up to achieve 8 out of 8!  Here is what you have achieved so far in my goals for you:  1.) HbA1C  less than 7:                              NO  Your last  HbA1C :  Lab Results   Component Value Date    A1C 7.6 11/09/2020    A1C 7.9 08/07/2020    A1C 8.1 05/04/2020    A1C Test canceled by physician 02/04/2020    A1C 7.9 01/15/2020     2.) Blood Pressure less than 140/80:       YES      Your last    BP Readings from Last 1 Encounters:   01/06/21 120/82     3.) LDL less than 100:                              YES      Your last     LDL Cholesterol Calculated   Date Value Ref Range Status   11/09/2020 74 <100 mg/dL Final     Comment:     Desirable:       <100 mg/dl       4.)  Checked HbA1C in the past 6 months: YES      5.) Checked LDL in the past 12 months:    YES      6.) Taking one aspirin daily:                       YES     7.) No tobacco use:                                        NO   8.) Statin use      YES

## 2021-01-07 ENCOUNTER — TELEPHONE (OUTPATIENT)
Dept: SLEEP MEDICINE | Facility: CLINIC | Age: 56
End: 2021-01-07

## 2021-01-07 ENCOUNTER — DOCUMENTATION ONLY (OUTPATIENT)
Dept: SLEEP MEDICINE | Facility: HOSPITAL | Age: 56
End: 2021-01-07

## 2021-01-07 ENCOUNTER — DOCUMENTATION ONLY (OUTPATIENT)
Dept: SLEEP MEDICINE | Facility: CLINIC | Age: 56
End: 2021-01-07

## 2021-01-07 DIAGNOSIS — G47.33 OSA (OBSTRUCTIVE SLEEP APNEA): Primary | ICD-10-CM

## 2021-01-07 DIAGNOSIS — E66.01 MORBID OBESITY (H): ICD-10-CM

## 2021-01-07 DIAGNOSIS — G47.34 NOCTURNAL HYPOXEMIA: ICD-10-CM

## 2021-01-07 DIAGNOSIS — G47.34 IDIOPATHIC SLEEP RELATED NONOBSTRUCTIVE ALVEOLAR HYPOVENTILATION: ICD-10-CM

## 2021-01-07 DIAGNOSIS — I10 ESSENTIAL (PRIMARY) HYPERTENSION: ICD-10-CM

## 2021-01-07 DIAGNOSIS — R06.89 HYPOVENTILATION: Primary | ICD-10-CM

## 2021-01-07 NOTE — PROGRESS NOTES
[facility]  Patient Marly Cannon was set up at [place] on January 7, 2021. Patient received a Resmed AirCurve 10 Auto. Pressures were set at EPAP MIN 5, IPAP MAX 20, PS 7.   Patient s ramp is 5 cm H2O for 20 min.  Patient received a Resmed Mask name: Airfit F30  Full Face mask size Medium, heated tubing and heated humidifier.  Patient does need to meet compliance. Patient has a follow up on 2/16/2021 with Dr. Duval.    Jaime Palomo

## 2021-01-07 NOTE — TELEPHONE ENCOUNTER
----- Message from Tila Hoff sent at 1/6/2021  3:57 PM CST -----  I got a call today wondering if you were going to put in the PFT order or if you would rather her primary do that I will let care coordination  know either way

## 2021-01-08 NOTE — PROGRESS NOTES
Clinic Care Coordination Contact  Care Team Conversations    CC notes Dr Duval ordered overnight oximetry and pt is scheduled for this.      Also placed order yesterday 1/7 for repeat PFT, however, pt is not scheduled yet.   Will monitor to make sure this gets set up.    She will follow up with Dr Duval 2/16/21.    Lauren Pipkin, BS-RN   Care Coordination  Primary Care- [facility]  [phone] Option # 1           
Clinic Care Coordination Contact  Care Team Conversations    Update received from Courtney Chaudhary NP today following pt's DR appt today.      Marly did report to Courtney today that she was concerned she hadn't heard anything further about follow-up after her sleep study and virtual visit 12/22 with Dr Duval.      Writer noted the provider did send a DME order to [facility] on 12/28.  CC phoned [facility] today and spoke with Bre.  She reported that they are working on finding out if [employer] will allow the Bi-pap without a titration study which could not be completed due to COVID-19 restrictions.  Bre stated she is going to try to call [facility] today for an update.      Writer will update pt.    She is also going to need to repeat pulmonary function testing.  Writer will reach out to the pulmonary dept to make sure they order this as it has not been ordered.  (See progress note from 12/22 visit with Dr Duval)    Lauren Pipkin, BS-RN   Care Coordination  Primary Care- [facility]  [phone] Option # 1           
 used

## 2021-01-13 DIAGNOSIS — J43.9 PULMONARY EMPHYSEMA, UNSPECIFIED EMPHYSEMA TYPE (H): ICD-10-CM

## 2021-01-13 RX ORDER — IPRATROPIUM BROMIDE AND ALBUTEROL 20; 100 UG/1; UG/1
SPRAY, METERED RESPIRATORY (INHALATION)
Qty: 4 G | Refills: 0 | Status: SHIPPED | OUTPATIENT
Start: 2021-01-13 | End: 2021-06-29

## 2021-01-20 ENCOUNTER — PATIENT OUTREACH (OUTPATIENT)
Dept: CARE COORDINATION | Facility: OTHER | Age: 56
End: 2021-01-20

## 2021-01-20 ASSESSMENT — ACTIVITIES OF DAILY LIVING (ADL): DEPENDENT_IADLS:: INDEPENDENT

## 2021-01-20 NOTE — PROGRESS NOTES
Clinic Care Coordination Contact  Care Team Conversations    Outgoing call placed to Marly.  Assisted with scheduling DM follow-up with Courtney Chaudhary NP on 2/10/21 at 8:30AM.  Appt is prior to her appt with Amberly Whyte NP which is scheduled as follows:    Next 5 appointments (look out 90 days)    Feb 10, 2021  9:40 AM  (Arrive by 9:25 AM)  SHORT with Amberly Whyte NP  [facility] [place] ([facility] ) [address] [place] [address]  [phone]        Lauren Pipkin, BS-RN   Care Coordination  Primary Care- [facility]  [phone] Option # 1

## 2021-02-01 ENCOUNTER — TELEPHONE (OUTPATIENT)
Dept: FAMILY MEDICINE | Facility: OTHER | Age: 56
End: 2021-02-01

## 2021-02-02 DIAGNOSIS — I10 ESSENTIAL HYPERTENSION: ICD-10-CM

## 2021-02-02 DIAGNOSIS — E55.9 VITAMIN D DEFICIENCY: ICD-10-CM

## 2021-02-03 RX ORDER — LISINOPRIL 40 MG/1
TABLET ORAL
Qty: 30 TABLET | Refills: 0 | Status: SHIPPED | OUTPATIENT
Start: 2021-02-03 | End: 2021-02-10

## 2021-02-03 RX ORDER — ACETAMINOPHEN 160 MG
TABLET,DISINTEGRATING ORAL
Qty: 90 CAPSULE | Refills: 0 | Status: SHIPPED | OUTPATIENT
Start: 2021-02-03 | End: 2021-05-11

## 2021-02-03 NOTE — TELEPHONE ENCOUNTER
Vit D3      Last Written Prescription Date:  10-  Last Fill Quantity: 100,   # refills: 0  Last Office Visit: 11-9-2020    Lisinopril 40 mg  Last Written Prescription Date:  8-7-2020  Last Fill Quantity: 30,   # refills: 5  Last Office Visit: 11-9-2020  Future Office visit:    Next 5 appointments (look out 90 days)    Feb 10, 2021  9:40 AM  (Arrive by 9:25 AM)  SHORT with Amberly Whyte NP  [facility] [place] [facility] [address] [place] [address]  [phone]

## 2021-02-03 NOTE — PROGRESS NOTES
Marly Cannon was set up with PAP equipment by [facility] and is enrolled in [facility].  See previous documentation by [facility] for equipment and supply details.

## 2021-02-08 NOTE — PROGRESS NOTES
Assessment & Plan     (I10) Essential hypertension  (primary encounter diagnosis)  Plan: Well controlled. Continue current medications. Encouraged daily exercise and a low sodium diet. Recommended checking BP's 2x/wk, call the clinic if consistantly s>140 or d>90. Follow up in 6 months.     (E11.9) Type 2 diabetes, HbA1c goal < 7% (H)  Comment: A1C 10.7  Plan: [facility]. They are aware and completing CGM study. On statin. On asa. BP at goal. Eye exam UTD. F/up with me 3 months.     (E78.5) Hyperlipidemia, unspecified hyperlipidemia type  Comment: tolerating statin  Plan: Will continue. Controlled.     (E11.29,  R80.9) Microalbuminuria due to type 2 diabetes mellitus (H)  Plan: On ACE. Will continue.     (E66.01) Morbid obesity (H)  Comment: BMI 35.    Plan: Diet and exercise encouraged.     (Z72.0) Tobacco abuse  Plan: Smoking 1-2 ppd. Cessation encouraged.     (J43.9) Pulmonary emphysema, unspecified emphysema type (H)  Comment: oxygen sats initially 89 percent after walking to room, they were then rechecked after sitting and were 93 percent  Plan: Most likely from her COPD. She feels well without shortness of breath. Therefore stopped all inhalers. She was encouraged to restart them. Will also update PFTs. Will notify patient of the results when available and intervene accordingly. Smoking cessation encouraged.     (M25.512) Acute pain of left shoulder  Comment: no red flags noted, Full ROM, most pain over upper trapezius  Plan: tiZANidine (ZANAFLEX) 4 MG tablet        Unable to take NSAIDs d/t CKD. Will begin low dose muscle relaxant. She was made aware of the side effects. Will also take Tylenol as needed. Rest, ice, and elevation encouraged. Will follow up in 2 weeks with persistent pain. Sooner with new or worsening symptoms. May then consider PT or imaging.     [id number]}     BMI:   Estimated body mass index is 35.7 kg/m  as calculated from the following:    Height as of this encounter: 1.626 m  "(5' 4\").    Weight as of this encounter: 94.3 kg (208 lb).   Weight management plan: Discussed healthy diet and exercise guidelines      Return in about 3 months (around 5/10/2021).    Amberly Whyte NP  [facility] PORSHA Luke is a 55 year old who presents to clinic today for the following health issues    HPI       Diabetes Follow-up    How often are you checking your blood sugar? One time daily, typically running around 100  What time of day are you checking your blood sugars (select all that apply)?  Before meals in the morning   Have you had any blood sugars above 200?  No  Have you had any blood sugars below 70?  No    What symptoms do you notice when your blood sugar is low?  Shaky    What concerns do you have today about your diabetes? None     Do you have any of these symptoms? (Select all that apply)  No numbness or tingling in feet.  No redness, sores or blisters on feet.  No complaints of excessive thirst.  No reports of blurry vision.  No significant changes to weight.     A1C was 7.6 on 11/9/20.     On asa.     She denies chest pain, shortness of breath, dizziness, syncope, or palpitations.   -Microalbuminuria present. On ACE.   -She does have chronic kidney disease. Typically avoids NSAIDs, but has been taking more due to her left shoulder pain.    -Taking Ozempic once weekly. Do note, she has trouble giving herself the medication, but the home care nurse helps her. Also on sliding scale Novolog.     Diabetic Foot Screen:  Any complaints of increased pain or numbness ? No  Is there a foot ulcer now or a history of foot ulcer? No  Does the foot have an abnormal shape? No  Are the nails thick, too long or ingrown? No  Are there any redness or open areas? No         Sensation Testing done at all points on the diagram with monofilament     Right Foot: Sensation Normal at all points  Left Foot: Sensation Normal at all points     Risk Category: 0- No loss of protective " sensation  Performed by Amberly Whyte NP              Hyperlipidemia Follow-Up      Are you regularly taking any medication or supplement to lower your cholesterol?   Yes- atorvastatin 40 mg    Are you having muscle aches or other side effects that you think could be caused by your cholesterol lowering medication?  No     As noted above, she denies chest pain, shortness of breath, dizziness, syncope, or palpitations.     Hypertension Follow-up      Do you check your blood pressure regularly outside of the clinic? Yes     Are you following a low salt diet? No    Are your blood pressures ever more than 140 on the top number (systolic) OR more   than 90 on the bottom number (diastolic), for example 140/90? No   -Taking amlodipine and lisinopril without side effects.    As noted above, she denies chest pain, shortness of breath, dizziness, syncope, or palpitations    BP Readings from Last 2 Encounters:   02/10/21 122/78   02/10/21 127/78     Hemoglobin A1C (%)   Date Value   02/10/2021 10.7 (H)   11/09/2020 7.6 (H)     LDL Cholesterol Calculated (mg/dL)   Date Value   11/09/2020 74   02/04/2020 49       COPD Follow-Up    Overall, how are your COPD symptoms since your last clinic visit?  No change, doing well, no shortness of breath    How much fatigue or shortness of breath do you have when you are walking?  Same as usual    How much shortness of breath do you have when you are resting?  None    How often do you cough? Sometimes    Have you noticed any change in your sputum/phlegm?  No    Have you experienced a recent fever? No    Please describe how far you can walk without stopping to rest:  Less than 1 block    How many flights of stairs are you able to walk up without stopping?  None    Have you had any Emergency Room Visits, Urgent Care Visits, or Hospital Admissions because of your COPD since your last office visit?  No     Has Combivent and Symbicort ordered. She notes that she has not been using these. No  "shortness of breath. Does not feel she needs them. Continues to smoke, smoking 1-2 ppd. No interest in quitting. No fevers. No blood in sputum. No unintentional weight loss.     Due for a Tdap. Declines.       Also due for the Hep B vaccine. Declines.     Musculoskeletal problem/pain-Left Shoulder  Onset/Duration: one week  Description  Location: left shoulder   Joint Swelling: no  Redness: no  Pain: YES  Warmth: no  Intensity:  moderate  Progression of Symptoms:  intermittent  Accompanying signs and symptoms:   Fevers: no  Numbness/tingling/weakness: no  History  Trauma to the area: no  Recent illness:  no  Previous similar problem: no  Previous evaluation:  no  Precipitating or alleviating factors:  Aggravating factors include: any movement of her shoulder  Therapies tried and outcome: rest/inactivity, ice and Ibuprofen       History   Smoking Status     Current Every Day Smoker     Packs/day: 1.00     Years: 34.00     Types: Cigarettes     Start date: 1/1/1979   Smokeless Tobacco     Never Used     Comment: Declined QP 05/13/2020     No results found for: FEV1, [id number]    Review of Systems   As noted in the HPI.       Objective    /78 (BP Location: Right arm, Patient Position: Chair, Cuff Size: Adult Large)   Pulse 82   Temp 98.4  F (36.9  C) (Tympanic)   Ht 1.626 m (5' 4\")   Wt 94.3 kg (208 lb)   SpO2 (!) 89%   BMI 35.70 kg/m    Body mass index is 35.7 kg/m .  Physical Exam   GENERAL: alert, no distress and obese  EYES: Eyes grossly normal to inspection, PERRL and conjunctivae and sclerae normal  HENT: ear canals and TM's normal, nose and mouth without ulcers or lesions  NECK: no adenopathy, no asymmetry, masses, or scars and thyroid normal to palpation  RESP: wheezes throughout all fields  CV: regular rate and rhythm, no murmur, no peripheral edema  NEURO: Normal strength and tone, mentation intact and speech normal  PSYCH: mentation appears normal, affect normal/bright  Diabetic foot exam: normal " sensory exam, dry cracking feet-follows with podiatry, pulses are present, but diminished   LEFT SHOULDER: Skin intact. No erythema, edema, or ecchymosis. No pain with palpation over anterior joint. No pain over bursa. No pain over AC joint. Some mild pain with palpation over upper trapezius muscle. Full ROM, no pain with flexion, extension, adduction, abduction, or internal and external rotation. Radial pulse 2+.     Results for orders placed or performed in visit on 02/10/21 (from the past 24 hour(s))   Basic metabolic panel   Result Value Ref Range    Sodium 131 (L) 133 - 144 mmol/L    Potassium 3.9 3.4 - 5.3 mmol/L    Chloride 96 94 - 109 mmol/L    Carbon Dioxide 31 20 - 32 mmol/L    Anion Gap 4 3 - 14 mmol/L    Glucose 476 (H) 70 - 99 mg/dL    Urea Nitrogen 22 7 - 30 mg/dL    Creatinine 1.07 (H) 0.52 - 1.04 mg/dL    GFR Estimate 57 (L) >60 mL/min/[1.73_m2]    GFR Estimate If Black 67 >60 mL/min/[1.73_m2]    Calcium 9.6 8.5 - 10.1 mg/dL   Hemoglobin A1c   Result Value Ref Range    Hemoglobin A1C 10.7 (H) 0 - 5.6 %

## 2021-02-09 ENCOUNTER — DOCUMENTATION ONLY (OUTPATIENT)
Dept: SLEEP MEDICINE | Facility: CLINIC | Age: 56
End: 2021-02-09

## 2021-02-09 NOTE — PROGRESS NOTES
30 DAY [facility] VISIT        Subjective measures:   Patient reports she has not been using the device. She states that he is sweating when using the device.   She also has the complaint of difficulty using the machine and trouble breathing while using the device.  Discussed acclimation techniques to help her adjust to using the machine.  She does have an oximetry order that she is picking up later this week and she was encouraged to use the device during the test.      Assessment: Pt not meeting objective benchmarks for compliance  Patient failing following subjective benchmarks: general discomfort.   Action plan:   Patient has scheduled a follow up visit with Dr. Duval on 2/16/21 .   Device type: Bilevel  PAP settings: Resmed AirCurve 10 Auto. Pressures were set at EPAP MIN 5, IPAP MAX 20, PS 7.   Mask type:  Full Face Mask  Objective measures: 14 day rolling measures      Compliance  0 %      Average number of minutes 0      Objective measure goal  Compliance   Goal >70%  Leak   Goal < 24 lpm  AHI  Goal < 5  Usage  Goal >240        Total time spent on accessing and interpreting remote patient PAP therapy data  10 minutes    Total time spent counseling, coaching  and reviewing PAP therapy data with patient  0 minutes     [id number] this call  [id number] no  at 3 or 14 day [facility]

## 2021-02-10 ENCOUNTER — OFFICE VISIT (OUTPATIENT)
Dept: FAMILY MEDICINE | Facility: OTHER | Age: 56
End: 2021-02-10
Attending: NURSE PRACTITIONER
Payer: COMMERCIAL

## 2021-02-10 ENCOUNTER — ALLIED HEALTH/NURSE VISIT (OUTPATIENT)
Dept: EDUCATION SERVICES | Facility: OTHER | Age: 56
End: 2021-02-10
Attending: NURSE PRACTITIONER
Payer: COMMERCIAL

## 2021-02-10 VITALS
SYSTOLIC BLOOD PRESSURE: 127 MMHG | HEART RATE: 93 BPM | BODY MASS INDEX: 36.18 KG/M2 | DIASTOLIC BLOOD PRESSURE: 78 MMHG | WEIGHT: 211.9 LBS | OXYGEN SATURATION: 93 % | HEIGHT: 64 IN | RESPIRATION RATE: 16 BRPM

## 2021-02-10 VITALS
TEMPERATURE: 98.4 F | HEIGHT: 64 IN | OXYGEN SATURATION: 93 % | HEART RATE: 82 BPM | DIASTOLIC BLOOD PRESSURE: 78 MMHG | WEIGHT: 208 LBS | SYSTOLIC BLOOD PRESSURE: 122 MMHG | BODY MASS INDEX: 35.51 KG/M2

## 2021-02-10 DIAGNOSIS — E11.9 TYPE 2 DIABETES, HBA1C GOAL < 7% (H): ICD-10-CM

## 2021-02-10 DIAGNOSIS — Z72.0 TOBACCO ABUSE: ICD-10-CM

## 2021-02-10 DIAGNOSIS — F17.210 NICOTINE DEPENDENCE, CIGARETTES, UNCOMPLICATED: ICD-10-CM

## 2021-02-10 DIAGNOSIS — J43.9 PULMONARY EMPHYSEMA, UNSPECIFIED EMPHYSEMA TYPE (H): ICD-10-CM

## 2021-02-10 DIAGNOSIS — E78.5 HYPERLIPIDEMIA, UNSPECIFIED HYPERLIPIDEMIA TYPE: ICD-10-CM

## 2021-02-10 DIAGNOSIS — I10 ESSENTIAL HYPERTENSION: Primary | ICD-10-CM

## 2021-02-10 DIAGNOSIS — E66.01 MORBID OBESITY (H): ICD-10-CM

## 2021-02-10 DIAGNOSIS — R80.9 MICROALBUMINURIA DUE TO TYPE 2 DIABETES MELLITUS (H): ICD-10-CM

## 2021-02-10 DIAGNOSIS — E11.29 MICROALBUMINURIA DUE TO TYPE 2 DIABETES MELLITUS (H): ICD-10-CM

## 2021-02-10 DIAGNOSIS — M25.512 ACUTE PAIN OF LEFT SHOULDER: ICD-10-CM

## 2021-02-10 DIAGNOSIS — Z72.0 TOBACCO ABUSE: Primary | ICD-10-CM

## 2021-02-10 LAB
ANION GAP SERPL CALCULATED.3IONS-SCNC: 4 MMOL/L (ref 3–14)
BUN SERPL-MCNC: 22 MG/DL (ref 7–30)
CALCIUM SERPL-MCNC: 9.6 MG/DL (ref 8.5–10.1)
CHLORIDE SERPL-SCNC: 96 MMOL/L (ref 94–109)
CO2 SERPL-SCNC: 31 MMOL/L (ref 20–32)
CREAT SERPL-MCNC: 1.07 MG/DL (ref 0.52–1.04)
EST. AVERAGE GLUCOSE BLD GHB EST-MCNC: 260 MG/DL
GFR SERPL CREATININE-BSD FRML MDRD: 57 ML/MIN/{1.73_M2}
GLUCOSE SERPL-MCNC: 476 MG/DL (ref 70–99)
HBA1C MFR BLD: 10.7 % (ref 0–5.6)
POTASSIUM SERPL-SCNC: 3.9 MMOL/L (ref 3.4–5.3)
SODIUM SERPL-SCNC: 131 MMOL/L (ref 133–144)

## 2021-02-10 PROCEDURE — G0463 HOSPITAL OUTPT CLINIC VISIT: HCPCS

## 2021-02-10 PROCEDURE — 99213 OFFICE O/P EST LOW 20 MIN: CPT | Performed by: NURSE PRACTITIONER

## 2021-02-10 PROCEDURE — 36415 COLL VENOUS BLD VENIPUNCTURE: CPT | Mod: ZL | Performed by: NURSE PRACTITIONER

## 2021-02-10 PROCEDURE — G0463 HOSPITAL OUTPT CLINIC VISIT: HCPCS | Mod: 25

## 2021-02-10 PROCEDURE — 999N001182 HC STATISTIC ESTIMATED AVERAGE GLUCOSE: Mod: ZL | Performed by: NURSE PRACTITIONER

## 2021-02-10 PROCEDURE — 83036 HEMOGLOBIN GLYCOSYLATED A1C: CPT | Mod: ZL | Performed by: NURSE PRACTITIONER

## 2021-02-10 PROCEDURE — 80048 BASIC METABOLIC PNL TOTAL CA: CPT | Mod: ZL | Performed by: NURSE PRACTITIONER

## 2021-02-10 PROCEDURE — 99214 OFFICE O/P EST MOD 30 MIN: CPT | Performed by: NURSE PRACTITIONER

## 2021-02-10 PROCEDURE — G0463 HOSPITAL OUTPT CLINIC VISIT: HCPCS | Mod: 25,27

## 2021-02-10 PROCEDURE — 95250 CONT GLUC MNTR PHYS/QHP EQP: CPT | Performed by: NURSE PRACTITIONER

## 2021-02-10 RX ORDER — LISINOPRIL 40 MG/1
40 TABLET ORAL DAILY
Qty: 90 TABLET | Refills: 3 | Status: SHIPPED | OUTPATIENT
Start: 2021-02-10 | End: 2022-03-01

## 2021-02-10 ASSESSMENT — PAIN SCALES - GENERAL
PAINLEVEL: NO PAIN (0)
PAINLEVEL: NO PAIN (0)

## 2021-02-10 ASSESSMENT — MIFFLIN-ST. JEOR
SCORE: 1513.48
SCORE: 1527.2

## 2021-02-10 NOTE — PROGRESS NOTES
"Chief Complaint   Patient presents with     Diabetes       Initial /78   Pulse 93   Resp 16   Ht 1.619 m (5' 3.75\")   Wt 96.1 kg (211 lb 14.4 oz)   SpO2 93%   BMI 36.66 kg/m   Estimated body mass index is 36.66 kg/m  as calculated from the following:    Height as of this encounter: 1.619 m (5' 3.75\").    Weight as of this encounter: 96.1 kg (211 lb 14.4 oz).  Medication Reconciliation: complete  Gege Walter LPN    "

## 2021-02-10 NOTE — PROGRESS NOTES
SUBJECTIVE:  Marly Cannon, 55 year old, female presents with the following Chief Complaint(s) with HPI to follow:  Chief Complaint   Patient presents with     Diabetes        Diabetes Follow-up    Patient is checking blood sugars: 2x/day.  Results:  Highest: 530  Lowest: 218  Period average: 340      Symptoms of hypoglycemia (low blood sugar): none    Paresthesias (numbness or burning in feet) or sores: yes, no sores    Diabetic eye exam within the last year: Yes    Breakfast eaten regularly: sometimes    Patient counting carbs: Yes    Exercising: no          HPI:  Marly's here today for the follow up regarding her Diabetes mellitus, Type 2.    Lab Results   Component Value Date    A1C 7.6 11/09/2020    A1C 7.9 08/07/2020    A1C 8.1 05/04/2020    A1C Test canceled by physician 02/04/2020    A1C 7.9 01/15/2020     Current Diabetes medication:   1. Vgo--Novolog, 4 clicks with food  2. Ozempic 1 mg weekly (Tuesday)  Statin use: yes, simvastatin 20 mg daily  ASA: yes, 81 mg daily    Marly's here today for follow up regarding her diabetes.    Reports the following:  Denies any issues with her V-go.   States she's clicking 4 times with meals.      Denies any new health concerns    Due for labs    Has an appt with Amberly BARNES NP this morning     Patient Active Problem List   Diagnosis     Type 2 diabetes, HbA1c goal < 7% (H)     ACP (advance care planning)     Stage 3 chronic kidney disease     Pulmonary emphysema (H)     Hyperparathyroidism due to renal insufficiency (H)     Morbid obesity (H)     Vitamin D deficiency     Intellectual disability     Breast fibrocystic disorder     Tobacco abuse     Microalbuminuria due to type 2 diabetes mellitus (H)     H/O colonoscopy     [place] cardiac risk <10% in next 10 years     Tubular adenoma of colon     Hyperlipidemia, unspecified hyperlipidemia type     Essential hypertension     Callus of foot     Memory disturbance       No past medical history on file.    Past  Surgical History:   Procedure Laterality Date     COLONOSCOPY N/A 8/20/2015    Procedure: COLONOSCOPY;  Surgeon: Gentry Porter MD;  Location: [facility] OR     COLONOSCOPY N/A 9/21/2018    Procedure: COLONOSCOPY;  COLONOSCOPY WITH POLYPECTOMY;  Surgeon: Gentry Porter MD;  Location: [facility] OR       Family History   Problem Relation Age of Onset     Diabetes Mother      Diabetes Father      Hypertension Brother      Diabetes Sister        Social History     Tobacco Use     Smoking status: Current Every Day Smoker     Packs/day: 1.00     Years: 34.00     Pack years: 34.00     Types: Cigarettes     Start date: 1/1/1979     Smokeless tobacco: Never Used     Tobacco comment: Declined QP 05/13/2020   Substance Use Topics     Alcohol use: No     Alcohol/week: 0.0 standard drinks       Current Outpatient Medications   Medication Sig Dispense Refill     Acetaminophen (TYLENOL PO) Take 325 mg by mouth every 6 hours as needed        amLODIPine (NORVASC) 5 MG tablet Take 1 tablet (5 mg) by mouth daily 90 tablet 3     ammonium lactate (LAC-HYDRIN) 12 % cream Apply topically 2 times daily       aspirin (ASPIRIN LOW DOSE) 81 MG chewable tablet CHEW 1 TABLET BY MOUTH DAILY. DO NOT SPLIT OR CRUSH 90 tablet 3     atorvastatin (LIPITOR) 40 MG tablet Take 1 tablet (40 mg) by mouth At Bedtime 90 tablet 3     blood glucose (ONETOUCH ULTRA) test strip Use to test blood sugar 4 times daily 150 strip 3     budesonide-formoterol (SYMBICORT) 160-4.5 MCG/ACT Inhaler Inhale 2 puffs into the lungs 2 times daily 1 Inhaler 3     Cholecalciferol (VITAMIN D3) 50 MCG (2000 UT) CAPS TAKE 1 CAPSULE BY MOUTH DAILY 90 capsule 0     docusate sodium (COLACE) 100 MG capsule Take 1 capsule (100 mg) by mouth daily 90 capsule 1     hydrocortisone (CORTAID) 1 % external cream Apply topically 2 times daily 453.6 g 0     hydrocortisone 2.5 % cream Apply topically 2 times daily       insulin aspart (NOVOLOG VIAL) 100 UNITS/ML vial To be used with the V20.  TDD:  "56 20 mL 6     insulin pen needle (32G X 4 MM) 32G X 4 MM miscellaneous Use 1 pen needles daily 100 each 3     insulin syringes, disposable, (BD INSULIN SYRINGE) U-100 1 ML MISC 1 Device daily 30 each 2     ipratropium-albuterol (COMBIVENT RESPIMAT)  MCG/ACT inhaler INHALE 1 PUFF INTO THE LUNGS 4 TIMES DAILY 4 g 0     lisinopril (ZESTRIL) 40 MG tablet TAKE 1 TABLET BY MOUTH DAILY 30 tablet 0     Magnesium Cl-Calcium Carbonate 71.5-119 MG TBEC Take 2 tablets by mouth       nystatin (MYCOSTATIN) 729512 UNIT/GM external powder Apply topically 3 times daily 60 g 3     ONETOUCH DELICA LANCETS 33G MISC 1 each 3 times daily (Patient taking differently: 1 each 4 times daily ) 100 each 10     order for DME Women briefs 50 each 1     polyethylene glycol (MIRALAX) 17 g packet Take 17 g by mouth 3 times daily       primidone (MYSOLINE) 50 MG tablet TAKE 1 TABLET BY MOUTH AT BEDTIME 30 tablet 11     semaglutide (OZEMPIC, 1 MG/DOSE,) 2 MG/1.5ML pen Inject 1 mg Subcutaneous every 7 days 9 mL 1     triamcinolone (KENALOG) 0.1 % cream Apply topically 2 times daily       wearable insulin delivery device (Siamosoci 20) kit Insulin delivery device to be changed every 24 hours 30 each 6       No Known Allergies    REVIEW OF SYSTEMS  Skin: negative  Eyes: negative, wears glasses    Ears/Nose/Throat: negative  Respiratory: No shortness of breath or hemoptysis; smoker's cough; hx of sleep apnea--wearing it   Cardiovascular: negative  Gastrointestinal: negative  Genitourinary: negative  Musculoskeletal: left knee pain--\"good\"   Neurologic: numbness and tingling in legs--better; headache  Psychiatric: negative  Hematologic/Lymphatic/Immunologic: negative  Endocrine: positive for diabetes     OBJECTIVE:  /78   Pulse 93   Resp 16   Ht 1.619 m (5' 3.75\")   Wt 96.1 kg (211 lb 14.4 oz)   SpO2 93%   BMI 36.66 kg/m    Constitutional: healthy, alert and no distress  Musculoskeletal: extremities normal- no gross deformities noted and " gait normal  Skin: no suspicious lesions or rashes to visible skin  Psychiatric: mentation appears normal and affect normal/bright    LABS  No results found for any visits on 02/10/21.    ASSESSMENT / PLAN:  (Z72.0) Tobacco abuse  (primary encounter diagnosis)  Comment: noted, down to 16 cigs/day  Plan:   Work on decreasing to 14 cigs/day    (E11.9) Type 2 diabetes, HbA1c goal < 7% (H)  Comment: noted BG ave  Plan: GLUCOSE MONITORING CONTINUOUS, >=72 HR          Decision to start a professional CGM study    (F17.210) Nicotine dependence, cigarettes, uncomplicated   Comment: noted  Plan:   Marly's aware to let us know when she's ready to quit smoking.  Discussed the dangers of smoking with diabetes    Follow up   2 weeks    Call with questions/concerns      Patient Instructions   Continue working on healthy eating and moving (start low and slow, work up to 30 min, 5x/week)    BG goals:  Fasting and before meals <130, >70  2 hour after eating <180    We only need 1/2 of these numbers to be within target then your A1c will be within target    Medication changes   4 clicks    Follow up   2 weeks--CGM study review    Call me sooner if any problems/concerns and/or questions develop including consistent low BGs <70 or consistent high BGs >200  [phone] (Unit Coordinator)    [phone] (Nurse)    Smoking:   Try to reduce smoking to 16/day  Then try 14/day    Time: 30 minutes  Barrier: see PMH  Willingness to learn: accepting    Courtney PINTO [facility]  Disease Management    Cc: Amberly Whyte NP    With the electronic record, we can now more quickly and easily track our patient diabetic goals. Our diabetes clinical review is in progress and these are the indicators we are monitoring for good diabetes health.     1.) HbA1C less than 7 (measurement of your average blood sugars)  2.) Blood Pressure less than 140/80  3.) LDL less than 100 (bad cholesterol)  4.) HbA1C is checked in the last 6 months and below 7% (more  frequently if not at goal or adjusting medications)  5.) LDL is checked in the last 12 months (more frequently if not at goal or adjusting medications)  6.) Taking one baby aspirin daily (unless otherwise instructed)  7.) No tobacco use  8) Statin use     You have achieved 6 out of 8 of these and I am encouraging you to come in and get tuned up to achieve 8 out of 8!  Here is what you have achieved so far in my goals for you:  1.) HbA1C  less than 7:                              NO  Your last  HbA1C :  Lab Results   Component Value Date    A1C 7.6 11/09/2020    A1C 7.9 08/07/2020    A1C 8.1 05/04/2020    A1C Test canceled by physician 02/04/2020    A1C 7.9 01/15/2020     2.) Blood Pressure less than 140/80:       YES      Your last    BP Readings from Last 1 Encounters:   02/10/21 127/78     3.) LDL less than 100:                              YES      Your last     LDL Cholesterol Calculated   Date Value Ref Range Status   11/09/2020 74 <100 mg/dL Final     Comment:     Desirable:       <100 mg/dl       4.) Checked HbA1C in the past 6 months: YES      5.) Checked LDL in the past 12 months:    YES      6.) Taking one aspirin daily:                       YES     7.) No tobacco use:                                        NO   8.) Statin use      YES

## 2021-02-10 NOTE — PATIENT INSTRUCTIONS
Continue working on healthy eating and moving (start low and slow, work up to 30 min, 5x/week)    BG goals:  Fasting and before meals <130, >70  2 hour after eating <180    We only need 1/2 of these numbers to be within target then your A1c will be within target    Medication changes   4 clicks    Follow up   2 weeks--CGM study review    Call me sooner if any problems/concerns and/or questions develop including consistent low BGs <70 or consistent high BGs >200  [phone] (Unit Coordinator)    [phone] (Nurse)    Smoking:   Try to reduce smoking to 16/day  Then try 14/day

## 2021-02-10 NOTE — NURSING NOTE
"Chief Complaint   Patient presents with     Diabetes     Lipids     Hypertension     COPD       Initial /78 (BP Location: Right arm, Patient Position: Chair, Cuff Size: Adult Large)   Pulse 82   Temp 98.4  F (36.9  C) (Tympanic)   Ht 1.626 m (5' 4\")   Wt 94.3 kg (208 lb)   SpO2 (!) 89%   BMI 35.70 kg/m   Estimated body mass index is 35.7 kg/m  as calculated from the following:    Height as of this encounter: 1.626 m (5' 4\").    Weight as of this encounter: 94.3 kg (208 lb).  Medication Reconciliation: complete  Karolina Camacho LPN  "

## 2021-02-11 ENCOUNTER — APPOINTMENT (OUTPATIENT)
Dept: SLEEP MEDICINE | Facility: HOSPITAL | Age: 56
End: 2021-02-11
Attending: FAMILY MEDICINE
Payer: COMMERCIAL

## 2021-02-11 NOTE — PROGRESS NOTES
Patient picked up over night oximeter number [id number]. Patient was instructed on use of device. Patient verbalized understanding.     Patient will return device tomorrow by: noon

## 2021-02-12 ENCOUNTER — DOCUMENTATION ONLY (OUTPATIENT)
Dept: SLEEP MEDICINE | Facility: HOSPITAL | Age: 56
End: 2021-02-12
Attending: FAMILY MEDICINE
Payer: COMMERCIAL

## 2021-02-12 PROCEDURE — 99207 PR NO CHARGE NURSE ONLY: CPT | Performed by: FAMILY MEDICINE

## 2021-02-16 ENCOUNTER — VIRTUAL VISIT (OUTPATIENT)
Dept: SLEEP MEDICINE | Facility: HOSPITAL | Age: 56
End: 2021-02-16
Attending: FAMILY MEDICINE
Payer: COMMERCIAL

## 2021-02-16 DIAGNOSIS — G47.33 OSA (OBSTRUCTIVE SLEEP APNEA): Primary | ICD-10-CM

## 2021-02-16 DIAGNOSIS — K59.00 CONSTIPATION, UNSPECIFIED CONSTIPATION TYPE: ICD-10-CM

## 2021-02-16 PROCEDURE — 99212 OFFICE O/P EST SF 10 MIN: CPT | Mod: 95 | Performed by: FAMILY MEDICINE

## 2021-02-16 RX ORDER — DOCUSATE SODIUM 100 MG/1
CAPSULE, LIQUID FILLED ORAL
Qty: 90 CAPSULE | Refills: 0 | Status: SHIPPED | OUTPATIENT
Start: 2021-02-16 | End: 2021-06-29

## 2021-02-16 NOTE — PROGRESS NOTES
JDatawanda Cannon is a 55 year old female who is being evaluated via a billable telephone visit.       What phone number would you like to be contacted at?@   Home Phone [phone]   Work Phone Not on file.   Mobile [phone]       How would you like to obtain your AVS? [employer]       Telephone Virtual Visit Details     Type of service:  Telephone Virtual Visit     Time spent on call: 15 minutes    Virtual visit for bilevel PAP compliance follow-up.     Assessment:  - Severe ADEEL with severe sleep-associated hypoxemia and sustained hypoxemia, with increased concern for hypoventilation.  - Potential that severity under-reported given highly fragmented sleep architecture and no clear REM observed.  -Minimal usage with reported difficulty exhaling and inhaling on current settings of bilevel PAP auto titrate with minimum EPAP 5, maximum IPAP 20, pressure support 7 on spontaneous mode on room air.    Plan:  -To help aid compliance and to allow patient to at least retry treatment, we agreed to decrease pressures.  Plan to continue in bilevel auto titrate and spontaneous mode on room air, will continue minimum EPAP at 5 but will reduce maximum IPAP to 15 and pressure support to 4.    SUBJECTIVE:  Matthew Cannon is a 55 year old year old female.    Matthew feels that the bilevel is very uncomfortable and she could not breathe in or out while trying to use it.  Overall usage was minimal.  She is open to retrying the bilevel if we can decrease the pressures.    Pertinent PMHx of morbid obesity, HTN, elevated Hgb, DM II, COPD.     Most recent PFT's on 8/18/2015 consistent with airway obstruction, mild decrease DLCO with suspicion for concurrent restrictive disease.     Results for MATTHEW CANNON (MRN [id number]) as of 12/28/2020 10:27    Ref. Range 12/3/2016 16:48 9/21/2018 09:23 7/1/2019 11:30 5/21/2020 13:35 11/9/2020 08:42   Hemoglobin Latest Ref Range: 11.7 - 15.7 g/dL 16.6 (H) 17.9 (H) 16.9 (H) 16.1 (H) 16.7 (H)         Bilevel compliance report reviewed from January 16, 2021 through February 14, 2021 on auto titrate with minimum EPAP 5, maximum IPAP 20, pressure support 7 and spontaneous mode on room air.  Only used on 2 of 30 days with a total usage of 1 hour and 30 minutes.  AHI was 0.    Overnight oximetry was performed on February 12, 2021 with use of bilevel PAP.  Is unclear if this is a valid report given that the reported analysis time is only 1 hour 4 minutes and this appeared to be from roughly 3:05 PM through 4:05 PM.  Baseline SPO2 of 89.5%, isabel SPO2 84%.    ---  Interp for PSG dated 12/16/2020.     Weight 202 lbs.  Total sleep time 417.5 minutes, sleep efficiency 83%, sleep latency 15 minutes, REM latency - minutes.  Arousal index 45.6.  Sleep architecture was incredibly fragmented with no clear REM observed.     AHI 43.1, events appearing primarily obstructive in nature.  Mean Spo2 86%, isabel SpO2 78%, 96.2% of recording was <= 89%.       EKG appeared NSR.     No PLM's observed.      Unable to assess for phasic or tonic EMG activity in REM.  No abnormal nocturnal behaviors observed.     Assessment:  - Severe ADEEL with severe sleep-associated hypoxemia and sustained hypoxemia, with increased concern for hypoventilation.  - Potential that severity under-reported given highly fragmented sleep architecture and no clear REM observed.    Past medical history:    Patient Active Problem List    Diagnosis Date Noted     Memory disturbance 02/13/2020     Priority: Medium     Callus of foot 05/29/2019     Priority: Medium     Hyperlipidemia, unspecified hyperlipidemia type 05/28/2019     Priority: Medium     Essential hypertension 05/28/2019     Priority: Medium     H/O colonoscopy 04/24/2019     Priority: Medium     Had in 10/2018, due for repeat 5 years       [place] cardiac risk <10% in next 10 years 02/22/2019     Priority: Medium     Overview:   Started high intensity statin.        Tubular adenoma of colon  09/28/2018     Priority: Medium     Intellectual disability 08/01/2017     Priority: Medium     ACP (advance care planning) 09/09/2016     Priority: Medium     Advance Care Planning 9/9/2016: ACP Review of Chart / Resources Provided:  Reviewed chart for advance care plan.  Marly Cannon has been provided information and resources to begin or update their advance care plan.  Added by Naty Allen             Hyperparathyroidism due to renal insufficiency (H) 06/14/2016     Priority: Medium     Vitamin D deficiency 06/14/2016     Priority: Medium     Microalbuminuria due to type 2 diabetes mellitus (H) 06/14/2016     Priority: Medium     Stage 3 chronic kidney disease 02/12/2016     Priority: Medium     Overview:   Updated per 10/1/17 IMO import       Morbid obesity (H) 02/12/2016     Priority: Medium     Pulmonary emphysema (H) 08/21/2015     Priority: Medium     Confirmed via PFTs in 8/2015       Type 2 diabetes, HbA1c goal < 7% (H) 07/24/2015     Priority: Medium     Tobacco abuse 07/09/2015     Priority: Medium     Breast fibrocystic disorder 02/22/2008     Priority: Medium     Overview:   IMO Update 10/11         10 point ROS of systems including Constitutional, Eyes, Respiratory, Cardiovascular, Gastroenterology, Genitourinary, Integumentary, Muscularskeletal, Psychiatric were all negative except for pertinent positives noted in my HPI.    OBJECTIVE:  There were no vitals taken for this visit.    Physical Exam:  healthy, alert and no distress  PSYCH: Alert and oriented times 3; coherent speech, normal   rate and volume, able to articulate logical thoughts, able   to abstract reason, no tangential thoughts, no hallucinations   or delusions  His affect is normal  RESP: No cough, no audible wheezing, able to talk in full sentences  Remainder of exam unable to be completed due to telephone visits    ---  This note was written with the assistance of the Dragon voice-dictation technology software. The final  document, although reviewed, may contain errors. For corrections, please contact the office.    Anupam Duval MD    Sleep Medicine  [facility]  ([phone])  [facility]  ([phone])

## 2021-02-25 ENCOUNTER — ALLIED HEALTH/NURSE VISIT (OUTPATIENT)
Dept: EDUCATION SERVICES | Facility: OTHER | Age: 56
End: 2021-02-25
Attending: NURSE PRACTITIONER
Payer: COMMERCIAL

## 2021-02-25 VITALS
WEIGHT: 209.9 LBS | DIASTOLIC BLOOD PRESSURE: 82 MMHG | RESPIRATION RATE: 16 BRPM | HEART RATE: 82 BPM | SYSTOLIC BLOOD PRESSURE: 134 MMHG | BODY MASS INDEX: 37.19 KG/M2 | OXYGEN SATURATION: 93 % | HEIGHT: 63 IN

## 2021-02-25 DIAGNOSIS — F17.210 NICOTINE DEPENDENCE, CIGARETTES, UNCOMPLICATED: ICD-10-CM

## 2021-02-25 DIAGNOSIS — E11.65 TYPE 2 DIABETES MELLITUS WITH HYPERGLYCEMIA, WITH LONG-TERM CURRENT USE OF INSULIN (H): Primary | ICD-10-CM

## 2021-02-25 DIAGNOSIS — Z79.4 TYPE 2 DIABETES MELLITUS WITH HYPERGLYCEMIA, WITH LONG-TERM CURRENT USE OF INSULIN (H): Primary | ICD-10-CM

## 2021-02-25 PROCEDURE — G0463 HOSPITAL OUTPT CLINIC VISIT: HCPCS

## 2021-02-25 PROCEDURE — 99214 OFFICE O/P EST MOD 30 MIN: CPT | Mod: 25 | Performed by: NURSE PRACTITIONER

## 2021-02-25 PROCEDURE — 95251 CONT GLUC MNTR ANALYSIS I&R: CPT | Performed by: NURSE PRACTITIONER

## 2021-02-25 ASSESSMENT — MIFFLIN-ST. JEOR: SCORE: 1506.23

## 2021-02-25 ASSESSMENT — PAIN SCALES - GENERAL: PAINLEVEL: NO PAIN (0)

## 2021-02-25 NOTE — PROGRESS NOTES
"Chief Complaint   Patient presents with     Diabetes       Initial Pulse 82   Resp 16   Ht 1.6 m (5' 3\")   Wt 95.2 kg (209 lb 14.4 oz)   SpO2 93%   BMI 37.18 kg/m   Estimated body mass index is 37.18 kg/m  as calculated from the following:    Height as of this encounter: 1.6 m (5' 3\").    Weight as of this encounter: 95.2 kg (209 lb 14.4 oz).  Medication Reconciliation: complete  Gege Walter LPN    "

## 2021-02-25 NOTE — PROGRESS NOTES
SUBJECTIVE:  Marly Cannon, 55 year old, female presents with the following Chief Complaint(s) with HPI to follow:  Chief Complaint   Patient presents with     Diabetes        Diabetes Follow-up    Patient is checking blood sugars: continuous.  Results:  Date: 2/10/2021 to 2/24/2021  Glucose management indicator: 10.6%    Average glucose: 306    Glucose range  Very low (<54): 0  low (<70): 0  In target range (): 8%  High (>180): 25%  Very high (>250): 67%        Symptoms of hypoglycemia (low blood sugar): none    Paresthesias (numbness or burning in feet) or sores: yes, no sores    Diabetic eye exam within the last year: Yes    Breakfast eaten regularly: sometimes    Patient counting carbs: Yes    Exercising: no          HPI:  Marly's here today for the follow up regarding her Diabetes mellitus, Type 2.    Lab Results   Component Value Date    A1C 10.7 02/10/2021    A1C 7.6 11/09/2020    A1C 7.9 08/07/2020    A1C 8.1 05/04/2020    A1C Test canceled by physician 02/04/2020     Current Diabetes medication:   1. Vgo20 --Novolog, 4 clicks with food  2. Ozempic 1 mg weekly (Tuesday)  Statin use: yes, simvastatin 20 mg daily  ASA: yes, 81 mg daily    Marly's here today for an evaluation of her professional CGM study.   She reports the following:    Denies any issues with her V-go.   States she's clicking 4 times with meals.      Denies any new health concerns    Patient Active Problem List   Diagnosis     Type 2 diabetes, HbA1c goal < 7% (H)     ACP (advance care planning)     Stage 3 chronic kidney disease     Pulmonary emphysema (H)     Hyperparathyroidism due to renal insufficiency (H)     Morbid obesity (H)     Vitamin D deficiency     Intellectual disability     Breast fibrocystic disorder     Tobacco abuse     Microalbuminuria due to type 2 diabetes mellitus (H)     H/O colonoscopy     [place] cardiac risk <10% in next 10 years     Tubular adenoma of colon     Hyperlipidemia, unspecified  hyperlipidemia type     Essential hypertension     Callus of foot     Memory disturbance     ADEEL (obstructive sleep apnea)       History reviewed. No pertinent past medical history.    Past Surgical History:   Procedure Laterality Date     COLONOSCOPY N/A 8/20/2015    Procedure: COLONOSCOPY;  Surgeon: Gentry Porter MD;  Location: [facility]     COLONOSCOPY N/A 9/21/2018    Procedure: COLONOSCOPY;  COLONOSCOPY WITH POLYPECTOMY;  Surgeon: Gentry Porter MD;  Location: [facility]       Family History   Problem Relation Age of Onset     Diabetes Mother      Diabetes Father      Hypertension Brother      Diabetes Sister        Social History     Tobacco Use     Smoking status: Current Every Day Smoker     Packs/day: 1.00     Years: 34.00     Pack years: 34.00     Types: Cigarettes     Start date: 1/1/1979     Smokeless tobacco: Never Used     Tobacco comment: Declined QP 05/13/2020   Substance Use Topics     Alcohol use: No     Alcohol/week: 0.0 standard drinks       Current Outpatient Medications   Medication Sig Dispense Refill     Acetaminophen (TYLENOL PO) Take 325 mg by mouth every 6 hours as needed        amLODIPine (NORVASC) 5 MG tablet Take 1 tablet (5 mg) by mouth daily 90 tablet 3     ammonium lactate (LAC-HYDRIN) 12 % cream Apply topically 2 times daily       aspirin (ASPIRIN LOW DOSE) 81 MG chewable tablet CHEW 1 TABLET BY MOUTH DAILY. DO NOT SPLIT OR CRUSH 90 tablet 3     atorvastatin (LIPITOR) 40 MG tablet Take 1 tablet (40 mg) by mouth At Bedtime 90 tablet 3     blood glucose (ONETOUCH ULTRA) test strip Use to test blood sugar 4 times daily 150 strip 3     budesonide-formoterol (SYMBICORT) 160-4.5 MCG/ACT Inhaler Inhale 2 puffs into the lungs 2 times daily 1 Inhaler 3     Cholecalciferol (VITAMIN D3) 50 MCG (2000 UT) CAPS TAKE 1 CAPSULE BY MOUTH DAILY 90 capsule 0      MG capsule TAKE 1 CAPSULE BY MOUTH DAILY 90 capsule 0     hydrocortisone (CORTAID) 1 % external cream Apply topically 2 times  "daily 453.6 g 0     hydrocortisone 2.5 % cream Apply topically 2 times daily       insulin aspart (NOVOLOG VIAL) 100 UNITS/ML vial To be used with the V30.  TDD: 66 units 30 mL 3     insulin pen needle (32G X 4 MM) 32G X 4 MM miscellaneous Use 1 pen needles daily 100 each 3     insulin syringes, disposable, (BD INSULIN SYRINGE) U-100 1 ML MISC 1 Device daily 30 each 2     ipratropium-albuterol (COMBIVENT RESPIMAT)  MCG/ACT inhaler INHALE 1 PUFF INTO THE LUNGS 4 TIMES DAILY 4 g 0     lisinopril (ZESTRIL) 40 MG tablet Take 1 tablet (40 mg) by mouth daily 90 tablet 3     Magnesium Cl-Calcium Carbonate 71.5-119 MG TBEC Take 2 tablets by mouth       nystatin (MYCOSTATIN) 560593 UNIT/GM external powder Apply topically 3 times daily 60 g 3     ONETOUCH DELICA LANCETS 33G MISC 1 each 3 times daily (Patient taking differently: 1 each 4 times daily ) 100 each 10     order for DME Women briefs 50 each 1     polyethylene glycol (MIRALAX) 17 g packet Take 17 g by mouth 3 times daily       primidone (MYSOLINE) 50 MG tablet TAKE 1 TABLET BY MOUTH AT BEDTIME 30 tablet 11     semaglutide (OZEMPIC, 1 MG/DOSE,) 2 MG/1.5ML pen Inject 1 mg Subcutaneous every 7 days 9 mL 1     tiZANidine (ZANAFLEX) 4 MG tablet Take 1 tablet (4 mg) by mouth 3 times daily as needed for muscle spasms 30 tablet 0     triamcinolone (KENALOG) 0.1 % cream Apply topically 2 times daily       wearable insulin delivery device (V-GO 30) kit Insulin delivery device to be changed every 24 hours 30 each 0       No Known Allergies    REVIEW OF SYSTEMS  Skin: negative  Eyes: negative, wears glasses    Ears/Nose/Throat: negative  Respiratory: No shortness of breath or hemoptysis; smoker's cough; hx of sleep apnea--wearing it   Cardiovascular: negative  Gastrointestinal: negative  Genitourinary: negative  Musculoskeletal: left knee pain--\"good\"   Neurologic: numbness and tingling in legs--better; headache  Psychiatric: negative  Hematologic/Lymphatic/Immunologic: " "negative  Endocrine: positive for diabetes     OBJECTIVE:  /82   Pulse 82   Resp 16   Ht 1.6 m (5' 3\")   Wt 95.2 kg (209 lb 14.4 oz)   SpO2 93%   BMI 37.18 kg/m    Constitutional: healthy, alert and no distress  Musculoskeletal: extremities normal- no gross deformities noted and gait normal  Skin: no suspicious lesions or rashes to visible skin  Psychiatric: mentation appears normal and affect normal/bright    LABS  No results found for any visits on 02/25/21.    ASSESSMENT / PLAN:  (E11.65,  Z79.4) Type 2 diabetes mellitus with hyperglycemia, with long-term current use of insulin (H)  (primary encounter diagnosis)  Comment:   Noted CGM average and A1c    Plan: wearable insulin delivery device (V-GO 30) kit,        insulin aspart (NOVOLOG VIAL) 100 UNITS/ML         vial, GLUCOSE MONITOR, 72 HOUR, PHYS INTERP          Denies any change in her eating habits.   Will increase her to the V-Go 30 kit.   Marly reports she has 5 of her V-Go 20 kits.     Follow up  5-6 weeks  Will repeat another CGM to see if the V-go 30 is bringing her back to target    (F17.210) Nicotine dependence, cigarettes, uncomplicated   Comment: noted and refused  Plan:   Marly's aware to let us know when she's ready to quit smoking.  Discussed the dangers of smoking with diabetes    Follow up  5-6 weeks    Call with questions/concerns      Patient Instructions   Continue working on healthy eating and moving (start low and slow, work up to 30 min, 5x/week)    BG goals:  Fasting and before meals <130, >70  2 hour after eating <180    We only need 1/2 of these numbers to be within target then your A1c will be within target    Medication changes   5 clicks with meals  2 clicks with snacks    Follow up   April 1st    Call me sooner if any problems/concerns and/or questions develop including consistent low BGs <70 or consistent high BGs >200  [phone] (Unit Coordinator)    [phone] (Nurse)    Smoking:   Try keep working on " reducing the amount    Time: 35 minutes  Barrier: see [facility]  Willingness to learn: accepting    Courtney DONA PINTO [facility]  Disease Management    Cc: Amberly Whyte NP    35 minutes was spent with patient.    30 minutes of this time was spent on counseling patient regarding illness, medication and/or treatment options, coordinating further cares and follow ups that are needed along with resource material that will be helpful in the treatment of these issues.       With the electronic record, we can now more quickly and easily track our patient diabetic goals. Our diabetes clinical review is in progress and these are the indicators we are monitoring for good diabetes health.     1.) HbA1C less than 7 (measurement of your average blood sugars)  2.) Blood Pressure less than 140/80  3.) LDL less than 100 (bad cholesterol)  4.) HbA1C is checked in the last 6 months and below 7% (more frequently if not at goal or adjusting medications)  5.) LDL is checked in the last 12 months (more frequently if not at goal or adjusting medications)  6.) Taking one baby aspirin daily (unless otherwise instructed)  7.) No tobacco use  8) Statin use     You have achieved 6 out of 8 of these and I am encouraging you to come in and get tuned up to achieve 8 out of 8!  Here is what you have achieved so far in my goals for you:  1.) HbA1C  less than 7:                              NO  Your last  HbA1C :  Lab Results   Component Value Date    A1C 10.7 02/10/2021    A1C 7.6 11/09/2020    A1C 7.9 08/07/2020    A1C 8.1 05/04/2020    A1C Test canceled by physician 02/04/2020       2.) Blood Pressure less than 140/80:       YES      Your last    BP Readings from Last 1 Encounters:   02/25/21 134/82     3.) LDL less than 100:                              YES      Your last     LDL Cholesterol Calculated   Date Value Ref Range Status   11/09/2020 74 <100 mg/dL Final     Comment:     Desirable:       <100 mg/dl       4.) Checked HbA1C in the past 6 months:  YES      5.) Checked LDL in the past 12 months:    YES      6.) Taking one aspirin daily:                       YES     7.) No tobacco use:                                        NO   8.) Statin use      YES

## 2021-03-11 ENCOUNTER — HOSPITAL ENCOUNTER (OUTPATIENT)
Dept: RESPIRATORY THERAPY | Facility: HOSPITAL | Age: 56
Discharge: HOME | End: 2021-03-11
Attending: NURSE PRACTITIONER | Admitting: INTERNAL MEDICINE
Payer: COMMERCIAL

## 2021-03-11 DIAGNOSIS — J43.9 PULMONARY EMPHYSEMA, UNSPECIFIED EMPHYSEMA TYPE (H): ICD-10-CM

## 2021-03-11 LAB
COHGB MFR BLD: 3.4 % (ref 0–2)
HGB BLD-MCNC: 16.3 G/DL (ref 11.7–15.7)

## 2021-03-11 PROCEDURE — 94729 DIFFUSING CAPACITY: CPT

## 2021-03-11 PROCEDURE — 85018 HEMOGLOBIN: CPT | Performed by: NURSE PRACTITIONER

## 2021-03-11 PROCEDURE — 94060 EVALUATION OF WHEEZING: CPT

## 2021-03-11 PROCEDURE — 82375 ASSAY CARBOXYHB QUANT: CPT | Performed by: NURSE PRACTITIONER

## 2021-03-11 PROCEDURE — 36415 COLL VENOUS BLD VENIPUNCTURE: CPT | Performed by: NURSE PRACTITIONER

## 2021-03-11 PROCEDURE — 250N000009 HC RX 250: Performed by: NURSE PRACTITIONER

## 2021-03-11 PROCEDURE — 94060 EVALUATION OF WHEEZING: CPT | Mod: 26 | Performed by: INTERNAL MEDICINE

## 2021-03-11 PROCEDURE — 94729 DIFFUSING CAPACITY: CPT | Mod: 26 | Performed by: INTERNAL MEDICINE

## 2021-03-11 RX ORDER — ALBUTEROL SULFATE 90 UG/1
2 AEROSOL, METERED RESPIRATORY (INHALATION) ONCE
Status: COMPLETED | OUTPATIENT
Start: 2021-03-11 | End: 2021-03-11

## 2021-03-11 RX ADMIN — ALBUTEROL SULFATE 2 PUFF: 90 AEROSOL, METERED RESPIRATORY (INHALATION) at 14:24

## 2021-03-15 ENCOUNTER — TELEPHONE (OUTPATIENT)
Dept: FAMILY MEDICINE | Facility: OTHER | Age: 56
End: 2021-03-15

## 2021-03-15 NOTE — TELEPHONE ENCOUNTER
Spoke with patient . Informed her that pulmonary function test shows severe Copd. Recommendations  Were given to try and quit smoking. Smoking cessation  aides were offered but patient declined.

## 2021-03-19 ENCOUNTER — PATIENT OUTREACH (OUTPATIENT)
Dept: CARE COORDINATION | Facility: OTHER | Age: 56
End: 2021-03-19

## 2021-03-19 ASSESSMENT — ACTIVITIES OF DAILY LIVING (ADL): DEPENDENT_IADLS:: INDEPENDENT

## 2021-03-19 NOTE — PROGRESS NOTES
Clinic Care Coordination Contact    Situation: Patient chart reviewed by care coordinator.    Background: Pt had PCP and DM visits in Feb 2021.  CC has reviewed these visit notes for care plan updates.      Assessment: Pt had pulmonary function testing 3/11/21.  She was advised by provider's nurse (phone call) that the testing is showing 'severe COPD'.  PCP continues to recommend smoking cessation but pt declines assist with this.      In addition, her A1C has gone from 7.6 four months ago to 10.7 in Feb.   Per provider note/professional CGM study:  Average glucose: 306     Glucose range  Very low (<54): 0  low (<70): 0  In target range (): 8%  High (>180): 25%  Very high (>250): 67%      Courtney Chaudhary NP did start her on the V-Go 30 (was previously on the V-Go 20)    Plan/Recommendations: Follow-up for DM scheduled on 4/1/21.  PCP follow-up scheduled as follows:    Next 5 appointments (look out 90 days)    May 10, 2021  2:10 PM  (Arrive by 1:55 PM)  Office Visit with Amberly Whyte NP  [facility] ([facility] [place] ) [address]  [place] [address]  [phone]        CC will continue to monitor.    Lauren Pipkin, BS-RN   Care Coordination  Primary Care- [facility] [place] [facility]  [phone] Option # 1

## 2021-03-30 ENCOUNTER — TELEPHONE (OUTPATIENT)
Dept: FAMILY MEDICINE | Facility: OTHER | Age: 56
End: 2021-03-30

## 2021-03-30 NOTE — TELEPHONE ENCOUNTER
Verbal orders given to paige with [facility] for weekly nurse visits. Just an FYI

## 2021-04-01 ENCOUNTER — ALLIED HEALTH/NURSE VISIT (OUTPATIENT)
Dept: EDUCATION SERVICES | Facility: OTHER | Age: 56
End: 2021-04-01
Attending: NURSE PRACTITIONER
Payer: COMMERCIAL

## 2021-04-01 VITALS
HEIGHT: 64 IN | HEART RATE: 94 BPM | RESPIRATION RATE: 16 BRPM | WEIGHT: 212.2 LBS | BODY MASS INDEX: 36.23 KG/M2 | OXYGEN SATURATION: 90 % | DIASTOLIC BLOOD PRESSURE: 84 MMHG | SYSTOLIC BLOOD PRESSURE: 133 MMHG

## 2021-04-01 DIAGNOSIS — F17.210 NICOTINE DEPENDENCE, CIGARETTES, UNCOMPLICATED: ICD-10-CM

## 2021-04-01 DIAGNOSIS — Z79.4 TYPE 2 DIABETES MELLITUS WITH HYPERGLYCEMIA, WITH LONG-TERM CURRENT USE OF INSULIN (H): Primary | ICD-10-CM

## 2021-04-01 DIAGNOSIS — E11.65 TYPE 2 DIABETES MELLITUS WITH HYPERGLYCEMIA, WITH LONG-TERM CURRENT USE OF INSULIN (H): Primary | ICD-10-CM

## 2021-04-01 PROCEDURE — G0463 HOSPITAL OUTPT CLINIC VISIT: HCPCS

## 2021-04-01 PROCEDURE — G0463 HOSPITAL OUTPT CLINIC VISIT: HCPCS | Mod: 25

## 2021-04-01 PROCEDURE — 95250 CONT GLUC MNTR PHYS/QHP EQP: CPT | Performed by: NURSE PRACTITIONER

## 2021-04-01 PROCEDURE — 99214 OFFICE O/P EST MOD 30 MIN: CPT | Performed by: NURSE PRACTITIONER

## 2021-04-01 RX ORDER — PROCHLORPERAZINE 25 MG/1
1 SUPPOSITORY RECTAL ONCE
Qty: 1 EACH | Refills: 0 | Status: SHIPPED | OUTPATIENT
Start: 2021-04-01 | End: 2021-04-01

## 2021-04-01 RX ORDER — PROCHLORPERAZINE 25 MG/1
1 SUPPOSITORY RECTAL
Qty: 1 EACH | Refills: 1 | Status: SHIPPED | OUTPATIENT
Start: 2021-04-01 | End: 2021-05-07

## 2021-04-01 RX ORDER — PROCHLORPERAZINE 25 MG/1
1 SUPPOSITORY RECTAL
Qty: 3 EACH | Refills: 5 | Status: SHIPPED | OUTPATIENT
Start: 2021-04-01 | End: 2021-05-07

## 2021-04-01 ASSESSMENT — PAIN SCALES - GENERAL: PAINLEVEL: NO PAIN (0)

## 2021-04-01 ASSESSMENT — MIFFLIN-ST. JEOR: SCORE: 1528.56

## 2021-04-01 NOTE — PATIENT INSTRUCTIONS
Continue working on healthy eating and moving (start low and slow, work up to 30 min, 5x/week)    BG goals:  Fasting and before meals <130, >70  2 hour after eating <180    We only need 1/2 of these numbers to be within target then your A1c will be within target    Medication changes   None     Follow up   5 weeks  Might need to repeat your CGM study    Call me sooner if any problems/concerns and/or questions develop including consistent low BGs <70 or consistent high BGs >200  [phone] (Unit Coordinator)    [phone] (Anna, Nurse)    Smoking:   Try keep working on reducing the amount of cigarettes

## 2021-04-01 NOTE — PROGRESS NOTES
Marly is having a glucose sensor insertion for a CGM study today. Sensor agreement signed.    Sensor attached to right outer upper arm. No redness, drainage or bleeding noted. Patient instructed on testing schedule, how to complete the patient log, restrictions during wear and to remove if having an x-ray, MRI or CT.    Patient return date requested from Courtney Chaudhary. Pt unavailable in to weeks for removal and eval, pt will remove on 04/16/21 and bring to appt on 04/21/21.    Freestyle Bud Sensor:  Lot#:[id number]  Expiration Date: 05/31/21  Sensor S/N: [id number]    Patient's identity was verified by using patient's name and date of birth prior to insertion.    Sensor started and 2 and 7 minute re-check completed.    Written instructions and patient log given to patient.  Gege Walter LPN

## 2021-04-01 NOTE — PROGRESS NOTES
"Chief Complaint   Patient presents with     Diabetes       Initial Wt 96.3 kg (212 lb 3.2 oz)   BMI 37.59 kg/m   Estimated body mass index is 37.59 kg/m  as calculated from the following:    Height as of 2/25/21: 1.6 m (5' 3\").    Weight as of this encounter: 96.3 kg (212 lb 3.2 oz).  Medication Reconciliation: complete  Gege Walter LPN    "

## 2021-04-01 NOTE — PROGRESS NOTES
SUBJECTIVE:  Marly Cannon, 55 year old, female presents with the following Chief Complaint(s) with HPI to follow:  Chief Complaint   Patient presents with     Diabetes        Diabetes Follow-up    Patient is checking blood sugars: 1-2x/day.  Results:  Bg ave: 271  Highest: 339  Lowest: 186        Symptoms of hypoglycemia (low blood sugar): none    Paresthesias (numbness or burning in feet) or sores: yes, no sores    Diabetic eye exam within the last year: DUE (appt next month)    Breakfast eaten regularly: sometimes    Patient counting carbs: Yes    Exercising: no          HPI:  Marly's here today for the follow up regarding her Diabetes mellitus, Type 2.    Lab Results   Component Value Date    A1C 10.7 02/10/2021    A1C 7.6 11/09/2020    A1C 7.9 08/07/2020    A1C 8.1 05/04/2020    A1C Test canceled by physician 02/04/2020     Current Diabetes medication:   1. Vgo30 --Novolog,43 clicks with food  2. Ozempic 1 mg weekly (Tuesday)  Statin use: yes, simvastatin 20 mg daily  ASA: yes, 81 mg daily    Marly's here today for follow up from her Vgo 30  Denies any issues with her V-go.   States she's clicking 4 times with meals.      Denies any new health concerns    Patient Active Problem List   Diagnosis     Type 2 diabetes, HbA1c goal < 7% (H)     ACP (advance care planning)     Stage 3 chronic kidney disease     Pulmonary emphysema (H)     Hyperparathyroidism due to renal insufficiency (H)     Morbid obesity (H)     Vitamin D deficiency     Intellectual disability     Breast fibrocystic disorder     Tobacco abuse     Microalbuminuria due to type 2 diabetes mellitus (H)     H/O colonoscopy     [place] cardiac risk <10% in next 10 years     Tubular adenoma of colon     Hyperlipidemia, unspecified hyperlipidemia type     Essential hypertension     Callus of foot     Memory disturbance     ADEEL (obstructive sleep apnea)       No past medical history on file.    Past Surgical History:   Procedure Laterality Date      COLONOSCOPY N/A 8/20/2015    Procedure: COLONOSCOPY;  Surgeon: Gentry Porter MD;  Location: [facility]     COLONOSCOPY N/A 9/21/2018    Procedure: COLONOSCOPY;  COLONOSCOPY WITH POLYPECTOMY;  Surgeon: Gentry Porter MD;  Location: [facility]       Family History   Problem Relation Age of Onset     Diabetes Mother      Diabetes Father      Hypertension Brother      Diabetes Sister        Social History     Tobacco Use     Smoking status: Current Every Day Smoker     Packs/day: 1.00     Years: 34.00     Pack years: 34.00     Types: Cigarettes     Start date: 1/1/1979     Smokeless tobacco: Never Used     Tobacco comment: Declined QP 05/13/2020   Substance Use Topics     Alcohol use: No     Alcohol/week: 0.0 standard drinks       Current Outpatient Medications   Medication Sig Dispense Refill     Acetaminophen (TYLENOL PO) Take 325 mg by mouth every 6 hours as needed        amLODIPine (NORVASC) 5 MG tablet Take 1 tablet (5 mg) by mouth daily 90 tablet 3     ammonium lactate (LAC-HYDRIN) 12 % cream Apply topically 2 times daily       aspirin (ASPIRIN LOW DOSE) 81 MG chewable tablet CHEW 1 TABLET BY MOUTH DAILY. DO NOT SPLIT OR CRUSH 90 tablet 3     atorvastatin (LIPITOR) 40 MG tablet Take 1 tablet (40 mg) by mouth At Bedtime 90 tablet 3     blood glucose (ONETOUCH ULTRA) test strip Use to test blood sugar 4 times daily 150 strip 3     budesonide-formoterol (SYMBICORT) 160-4.5 MCG/ACT Inhaler Inhale 2 puffs into the lungs 2 times daily 1 Inhaler 3     Cholecalciferol (VITAMIN D3) 50 MCG (2000 UT) CAPS TAKE 1 CAPSULE BY MOUTH DAILY 90 capsule 0     Continuous Blood Gluc Sensor (DEXCOM G6 SENSOR) MISC 1 each every 10 days Change every 10 days. 3 each 5     Continuous Blood Gluc Transmit (DEXCOM G6 TRANSMITTER) MISC 1 each every 3 months Change every 3 months. 1 each 1      MG capsule TAKE 1 CAPSULE BY MOUTH DAILY 90 capsule 0     hydrocortisone (CORTAID) 1 % external cream Apply topically 2 times daily 453.6 g 0      hydrocortisone 2.5 % cream Apply topically 2 times daily       insulin aspart (NOVOLOG VIAL) 100 UNITS/ML vial To be used with the V30.  TDD: 66 units 30 mL 3     insulin pen needle (32G X 4 MM) 32G X 4 MM miscellaneous Use 1 pen needles daily 100 each 3     insulin syringes, disposable, (BD INSULIN SYRINGE) U-100 1 ML MISC 1 Device daily 30 each 2     ipratropium-albuterol (COMBIVENT RESPIMAT)  MCG/ACT inhaler INHALE 1 PUFF INTO THE LUNGS 4 TIMES DAILY 4 g 0     lisinopril (ZESTRIL) 40 MG tablet Take 1 tablet (40 mg) by mouth daily 90 tablet 3     Magnesium Cl-Calcium Carbonate 71.5-119 MG TBEC Take 2 tablets by mouth       nystatin (MYCOSTATIN) 723462 UNIT/GM external powder Apply topically 3 times daily 60 g 3     ONETOUCH DELICA LANCETS 33G MISC 1 each 3 times daily (Patient taking differently: 1 each 4 times daily ) 100 each 10     order for DME Women briefs 50 each 1     polyethylene glycol (MIRALAX) 17 g packet Take 17 g by mouth 3 times daily       primidone (MYSOLINE) 50 MG tablet TAKE 1 TABLET BY MOUTH AT BEDTIME 30 tablet 11     semaglutide (OZEMPIC, 1 MG/DOSE,) 2 MG/1.5ML pen Inject 1 mg Subcutaneous every 7 days 9 mL 1     tiZANidine (ZANAFLEX) 4 MG tablet Take 1 tablet (4 mg) by mouth 3 times daily as needed for muscle spasms 30 tablet 0     triamcinolone (KENALOG) 0.1 % cream Apply topically 2 times daily       wearable insulin delivery device (Kaminario 30) kit Insulin delivery device to be changed every 24 hours 30 each 0     Continuous Blood Gluc  (FREESTYLE HANK 2 READER) AISHA 1 each continuous Used as directed by  1 each 0     Continuous Blood Gluc Sensor (FREESTYLE HANK 2 SENSOR) MISC 1 each continuous 2 each 5       No Known Allergies    REVIEW OF SYSTEMS  Skin: negative  Eyes: negative, wears glasses    Ears/Nose/Throat: negative  Respiratory: No shortness of breath or hemoptysis; smoker's cough; hx of sleep apnea--wearing it   Cardiovascular:  "negative  Gastrointestinal: negative  Genitourinary: negative  Musculoskeletal: left shoulder (been hurting off and on for month)  Neurologic: migraine headache--take Tylenol  Psychiatric: negative  Hematologic/Lymphatic/Immunologic: negative  Endocrine: positive for diabetes     OBJECTIVE:  /84   Pulse 94   Resp 16   Ht 1.619 m (5' 3.75\")   Wt 96.3 kg (212 lb 3.2 oz)   SpO2 90%   BMI 36.71 kg/m    Constitutional: healthy, alert and no distress  Musculoskeletal: extremities normal- no gross deformities noted and gait normal  Skin: no suspicious lesions or rashes to visible skin  Psychiatric: mentation appears normal and affect normal/bright    LABS  No results found for any visits on 04/01/21.    ASSESSMENT / PLAN:  (E11.65,  Z79.4) Type 2 diabetes mellitus with hyperglycemia, with long-term current use of insulin (H)  (primary encounter diagnosis)  Comment: noted BG average--was 306 during the last appt  Plan: GLUCOSE MONITORING CONTINUOUS, >=72 HR,         Continuous Blood Gluc  (DEXCOM G6         ) AISHA, Continuous Blood Gluc Transmit         (DEXCOM G6 TRANSMITTER) MISC, Continuous Blood         Gluc Sensor (DEXCOM G6 SENSOR) MISC          Education focused clicking for bolus BEFORE meals.   Explained the plan of repeating her CGM study during the next appt.      (F17.210) Nicotine dependence, cigarettes, uncomplicated   Comment: noted and working on it  Plan:   Marly's aware to let us know when she's ready to quit smoking.  Discussed the dangers of smoking with diabetes    Follow up  5-6 weeks    Call with questions/concerns      Patient Instructions   Continue working on healthy eating and moving (start low and slow, work up to 30 min, 5x/week)    BG goals:  Fasting and before meals <130, >70  2 hour after eating <180    We only need 1/2 of these numbers to be within target then your A1c will be within target    Medication changes   None     Follow up   5 weeks  Might need to " repeat your CGM study    Call me sooner if any problems/concerns and/or questions develop including consistent low BGs <70 or consistent high BGs >200  [phone] (Unit Coordinator)    [phone] (Anna, Nurse)    Smoking:   Try keep working on reducing the amount of cigarettes     Time: 30 minutes  Barrier: see PMH  Willingness to learn: accepting    Courtney PINTO [facility]-BC  Disease Management    Cc: Amberly Whyte NP    30 minutes was spent with patient.    All of this time was spent on counseling patient regarding illness, medication and/or treatment options, coordinating further cares and follow ups that are needed along with resource material that will be helpful in the treatment of these issues.       With the electronic record, we can now more quickly and easily track our patient diabetic goals. Our diabetes clinical review is in progress and these are the indicators we are monitoring for good diabetes health.     1.) HbA1C less than 7 (measurement of your average blood sugars)  2.) Blood Pressure less than 140/80  3.) LDL less than 100 (bad cholesterol)  4.) HbA1C is checked in the last 6 months and below 7% (more frequently if not at goal or adjusting medications)  5.) LDL is checked in the last 12 months (more frequently if not at goal or adjusting medications)  6.) Taking one baby aspirin daily (unless otherwise instructed)  7.) No tobacco use  8) Statin use     You have achieved 6 out of 8 of these and I am encouraging you to come in and get tuned up to achieve 8 out of 8!  Here is what you have achieved so far in my goals for you:  1.) HbA1C  less than 7:                              NO  Your last  HbA1C :  Lab Results   Component Value Date    A1C 10.7 02/10/2021    A1C 7.6 11/09/2020    A1C 7.9 08/07/2020    A1C 8.1 05/04/2020    A1C Test canceled by physician 02/04/2020       2.) Blood Pressure less than 140/80:       YES      Your last    BP Readings from Last 1 Encounters:   04/01/21 133/84      3.) LDL less than 100:                              YES      Your last     LDL Cholesterol Calculated   Date Value Ref Range Status   11/09/2020 74 <100 mg/dL Final     Comment:     Desirable:       <100 mg/dl       4.) Checked HbA1C in the past 6 months: YES      5.) Checked LDL in the past 12 months:    YES      6.) Taking one aspirin daily:                       YES     7.) No tobacco use:                                        NO   8.) Statin use      YES

## 2021-04-05 ENCOUNTER — TELEPHONE (OUTPATIENT)
Dept: EDUCATION SERVICES | Facility: OTHER | Age: 56
End: 2021-04-05

## 2021-04-05 DIAGNOSIS — Z79.4 TYPE 2 DIABETES MELLITUS WITH HYPERGLYCEMIA, WITH LONG-TERM CURRENT USE OF INSULIN (H): Primary | ICD-10-CM

## 2021-04-05 DIAGNOSIS — E11.65 TYPE 2 DIABETES MELLITUS WITH HYPERGLYCEMIA, WITH LONG-TERM CURRENT USE OF INSULIN (H): Primary | ICD-10-CM

## 2021-04-06 NOTE — TELEPHONE ENCOUNTER
Order sent for XZERES Bud 2 as it has alarms.     Courtney Chaudhary APRN FNP-BC  Diabetes and Wound Care

## 2021-04-08 ENCOUNTER — TRANSFERRED RECORDS (OUTPATIENT)
Dept: HEALTH INFORMATION MANAGEMENT | Facility: HOSPITAL | Age: 56
End: 2021-04-08

## 2021-04-08 LAB
RETINOPATHY: NORMAL

## 2021-04-09 ENCOUNTER — TELEPHONE (OUTPATIENT)
Dept: EDUCATION SERVICES | Facility: HOSPITAL | Age: 56
End: 2021-04-09

## 2021-04-09 NOTE — TELEPHONE ENCOUNTER
Fax received from [facility] [facility] pharmacy PA needed for the pt's Bud reader and sensors. PA completed on Sepior, PA denied. Pt has not been testing 4 times a day. Pharmacy notified. I called the pt and left a message to call back.

## 2021-04-09 NOTE — TELEPHONE ENCOUNTER
I called the pt and notified. Pt will check her BG's 4 times a day and come in on 04/26/21 with her BG meter for download. Appt scheduled.

## 2021-04-12 ENCOUNTER — TELEPHONE (OUTPATIENT)
Dept: EDUCATION SERVICES | Facility: OTHER | Age: 56
End: 2021-04-12

## 2021-04-12 NOTE — TELEPHONE ENCOUNTER
"Pt called she would like to know if she can remove the \"patch\" on 04/14/21, I checked Courtney Chaudhary's recent office note and notified she can remove the Bud on 04/16/21 and then bring to her appt with Courtney chaudhary on 04/21/21.  "

## 2021-04-21 ENCOUNTER — ALLIED HEALTH/NURSE VISIT (OUTPATIENT)
Dept: EDUCATION SERVICES | Facility: OTHER | Age: 56
End: 2021-04-21
Attending: NURSE PRACTITIONER
Payer: COMMERCIAL

## 2021-04-21 VITALS
BODY MASS INDEX: 37.58 KG/M2 | HEART RATE: 88 BPM | RESPIRATION RATE: 16 BRPM | OXYGEN SATURATION: 90 % | DIASTOLIC BLOOD PRESSURE: 62 MMHG | WEIGHT: 212.1 LBS | SYSTOLIC BLOOD PRESSURE: 92 MMHG | HEIGHT: 63 IN

## 2021-04-21 DIAGNOSIS — F17.210 NICOTINE DEPENDENCE, CIGARETTES, UNCOMPLICATED: ICD-10-CM

## 2021-04-21 DIAGNOSIS — Z79.4 TYPE 2 DIABETES MELLITUS WITH HYPERGLYCEMIA, WITH LONG-TERM CURRENT USE OF INSULIN (H): Primary | ICD-10-CM

## 2021-04-21 DIAGNOSIS — E11.65 TYPE 2 DIABETES MELLITUS WITH HYPERGLYCEMIA, WITH LONG-TERM CURRENT USE OF INSULIN (H): Primary | ICD-10-CM

## 2021-04-21 PROCEDURE — G0463 HOSPITAL OUTPT CLINIC VISIT: HCPCS

## 2021-04-21 PROCEDURE — 99213 OFFICE O/P EST LOW 20 MIN: CPT | Performed by: NURSE PRACTITIONER

## 2021-04-21 ASSESSMENT — MIFFLIN-ST. JEOR: SCORE: 1516.21

## 2021-04-21 ASSESSMENT — PAIN SCALES - GENERAL: PAINLEVEL: NO PAIN (0)

## 2021-04-21 NOTE — PROGRESS NOTES
SUBJECTIVE:  Marly Cannon, 55 year old, female presents with the following Chief Complaint(s) with HPI to follow:  Chief Complaint   Patient presents with     Diabetes        Diabetes Follow-up    Patient is checking blood sugars: 1-2x/day.  Results:  Ave: 388  Highest: 589  Lowest: 289      Symptoms of hypoglycemia (low blood sugar): none    Paresthesias (numbness or burning in feet) or sores: yes, no sores    Diabetic eye exam within the last year: UTD    Breakfast eaten regularly: sometimes    Patient counting carbs: Yes    Exercising: no          HPI:  Marly's here today for the follow up regarding her Diabetes mellitus, Type 2.    Lab Results   Component Value Date    A1C 10.7 02/10/2021    A1C 7.6 11/09/2020    A1C 7.9 08/07/2020    A1C 8.1 05/04/2020    A1C Test canceled by physician 02/04/2020     Current Diabetes medication:   1. Vgo30 --Novolog, 4 clicks with supper  2. Ozempic 1 mg weekly (Tuesday)  Statin use: yes, simvastatin 20 mg daily  ASA: yes, 81 mg daily    Marly's here today for her professional CGM study evaluation.  Unfortunately, it didn't record.   She reports the following:  Denies any issues with her V-go.   States she's clicking 4 times with meals.      Denies any new health concerns    Decision to start another professional CGM study.        Patient Active Problem List   Diagnosis     Type 2 diabetes, HbA1c goal < 7% (H)     ACP (advance care planning)     Stage 3 chronic kidney disease     Pulmonary emphysema (H)     Hyperparathyroidism due to renal insufficiency (H)     Morbid obesity (H)     Vitamin D deficiency     Intellectual disability     Breast fibrocystic disorder     Tobacco abuse     Microalbuminuria due to type 2 diabetes mellitus (H)     H/O colonoscopy     [place] cardiac risk <10% in next 10 years     Tubular adenoma of colon     Hyperlipidemia, unspecified hyperlipidemia type     Essential hypertension     Callus of foot     Memory disturbance     ADEEL  (obstructive sleep apnea)       History reviewed. No pertinent past medical history.    Past Surgical History:   Procedure Laterality Date     COLONOSCOPY N/A 8/20/2015    Procedure: COLONOSCOPY;  Surgeon: Gentry Porter MD;  Location: [facility]     COLONOSCOPY N/A 9/21/2018    Procedure: COLONOSCOPY;  COLONOSCOPY WITH POLYPECTOMY;  Surgeon: Gentry Porter MD;  Location: [facility]       Family History   Problem Relation Age of Onset     Diabetes Mother      Diabetes Father      Hypertension Brother      Diabetes Sister        Social History     Tobacco Use     Smoking status: Current Every Day Smoker     Packs/day: 1.00     Years: 34.00     Pack years: 34.00     Types: Cigarettes     Start date: 1/1/1979     Smokeless tobacco: Never Used     Tobacco comment: Declined QP 05/13/2020   Substance Use Topics     Alcohol use: No     Alcohol/week: 0.0 standard drinks       Current Outpatient Medications   Medication Sig Dispense Refill     Acetaminophen (TYLENOL PO) Take 325 mg by mouth every 6 hours as needed        amLODIPine (NORVASC) 5 MG tablet Take 1 tablet (5 mg) by mouth daily 90 tablet 3     ammonium lactate (LAC-HYDRIN) 12 % cream Apply topically 2 times daily       aspirin (ASPIRIN LOW DOSE) 81 MG chewable tablet CHEW 1 TABLET BY MOUTH DAILY. DO NOT SPLIT OR CRUSH 90 tablet 3     atorvastatin (LIPITOR) 40 MG tablet Take 1 tablet (40 mg) by mouth At Bedtime 90 tablet 3     blood glucose (ONETOUCH ULTRA) test strip Use to test blood sugar 4 times daily 150 strip 3     budesonide-formoterol (SYMBICORT) 160-4.5 MCG/ACT Inhaler Inhale 2 puffs into the lungs 2 times daily 1 Inhaler 3     Cholecalciferol (VITAMIN D3) 50 MCG (2000 UT) CAPS TAKE 1 CAPSULE BY MOUTH DAILY 90 capsule 0      MG capsule TAKE 1 CAPSULE BY MOUTH DAILY 90 capsule 0     hydrocortisone (CORTAID) 1 % external cream Apply topically 2 times daily 453.6 g 0     hydrocortisone 2.5 % cream Apply topically 2 times daily       insulin aspart  (NOVOLOG VIAL) 100 UNITS/ML vial To be used with the V40.  TDD: 76 units 40 mL 3     insulin pen needle (32G X 4 MM) 32G X 4 MM miscellaneous Use 1 pen needles daily 100 each 3     insulin syringes, disposable, (BD INSULIN SYRINGE) U-100 1 ML MISC 1 Device daily 30 each 2     ipratropium-albuterol (COMBIVENT RESPIMAT)  MCG/ACT inhaler INHALE 1 PUFF INTO THE LUNGS 4 TIMES DAILY 4 g 0     lisinopril (ZESTRIL) 40 MG tablet Take 1 tablet (40 mg) by mouth daily 90 tablet 3     Magnesium Cl-Calcium Carbonate 71.5-119 MG TBEC Take 2 tablets by mouth       nystatin (MYCOSTATIN) 456874 UNIT/GM external powder Apply topically 3 times daily 60 g 3     ONETOUCH DELICA LANCETS 33G MISC 1 each 3 times daily (Patient taking differently: 1 each 4 times daily ) 100 each 10     order for DME Women briefs 50 each 1     polyethylene glycol (MIRALAX) 17 g packet Take 17 g by mouth 3 times daily       primidone (MYSOLINE) 50 MG tablet TAKE 1 TABLET BY MOUTH AT BEDTIME 30 tablet 11     semaglutide (OZEMPIC, 1 MG/DOSE,) 2 MG/1.5ML pen Inject 1 mg Subcutaneous every 7 days 9 mL 1     tiZANidine (ZANAFLEX) 4 MG tablet Take 1 tablet (4 mg) by mouth 3 times daily as needed for muscle spasms 30 tablet 0     triamcinolone (KENALOG) 0.1 % cream Apply topically 2 times daily       wearable insulin delivery device ([employer] 40) kit Insulin delivery device to be changed every 24 hours 30 each 4     Continuous Blood Gluc  (FREESTYLE HANK 2 READER) AISHA 1 each continuous Used as directed by  (Patient not taking: Reported on 4/21/2021) 1 each 0     Continuous Blood Gluc Sensor (DEXCOM G6 SENSOR) MISC 1 each every 10 days Change every 10 days. (Patient not taking: Reported on 4/21/2021) 3 each 5     Continuous Blood Gluc Sensor (FREESTYLE HANK 2 SENSOR) MISC 1 each continuous (Patient not taking: Reported on 4/21/2021) 2 each 5     Continuous Blood Gluc Transmit (DEXCOM G6 TRANSMITTER) MISC 1 each every 3 months Change every 3  "months. (Patient not taking: Reported on 4/21/2021) 1 each 1       No Known Allergies    REVIEW OF SYSTEMS  Skin: negative  Eyes: negative, wears glasses    Ears/Nose/Throat: negative  Respiratory: No shortness of breath or hemoptysis; smoker's cough; hx of sleep apnea--wearing it   Cardiovascular: negative  Gastrointestinal: negative  Genitourinary: negative  Musculoskeletal: occasional left shoulder and left knee   Neurologic: migraine headache--take Tylenol  Psychiatric: negative  Hematologic/Lymphatic/Immunologic: negative  Endocrine: positive for diabetes     OBJECTIVE:  BP 92/62   Pulse 88   Resp 16   Ht 1.6 m (5' 3\")   Wt 96.2 kg (212 lb 1.6 oz)   SpO2 90%   BMI 37.57 kg/m    Constitutional: healthy, alert and no distress  Musculoskeletal: extremities normal- no gross deformities noted and gait normal  Skin: no suspicious lesions or rashes to visible skin  Psychiatric: mentation appears normal and affect normal/bright    LABS  No results found for any visits on 04/21/21.    ASSESSMENT / PLAN:  (E11.65,  Z79.4) Type 2 diabetes mellitus with hyperglycemia, with long-term current use of insulin (H)  (primary encounter diagnosis)  Comment:   Noted BG average    Plan: wearable insulin delivery device (V-GO 40) kit,        insulin aspart (NOVOLOG VIAL) 100 UNITS/ML vial          Patient wearing her Vgo  Insulin present    She was able to show me how to give herself a bolus.     Marly has 8 vgo's left.    Will increase her Vgo system to Vgo40.      Please see Anna Walter LPN documentation regarding professional CGM sensor placement.      (F17.210) Nicotine dependence, cigarettes, uncomplicated   Comment: noted and working on it  Plan:   Marly's aware to let us know when she's ready to quit smoking.  Discussed the dangers of smoking with diabetes    Follow up  2 weeks    Keep working on checking BGs 4x/day    Call with questions/concerns      Patient Instructions   Continue working on healthy eating and " moving (start low and slow, work up to 30 min, 5x/week)    BG goals:  Fasting and before meals <130, >70  2 hour after eating <180    We only need 1/2 of these numbers to be within target then your A1c will be within target    Medication changes   Finish up your Vgo30, then start Vgo40    Increase your clicks to 4 before supper--if blood glucose is still high after supper--okay to use 5 clicks   3 before breakfast/lunch    Follow up   2 weeks    Call me sooner if any problems/concerns and/or questions develop including consistent low BGs <70 or consistent high BGs >200  [phone] (Unit Coordinator)    [phone] (Anna, Nurse)    Smoking:   Try keep working on reducing the amount of cigarettes     Keep checking your BG 4x/day      Time: 30 minutes  Barrier: see PMH  Willingness to learn: accepting    Courtney PINTO [facility]  Diabetes and Wound Care      Cc: Amberly Whyte NP      With the electronic record, we can now more quickly and easily track our patient diabetic goals. Our diabetes clinical review is in progress and these are the indicators we are monitoring for good diabetes health.     1.) HbA1C less than 7 (measurement of your average blood sugars)  2.) Blood Pressure less than 140/80  3.) LDL less than 100 (bad cholesterol)  4.) HbA1C is checked in the last 6 months and below 7% (more frequently if not at goal or adjusting medications)  5.) LDL is checked in the last 12 months (more frequently if not at goal or adjusting medications)  6.) Taking one baby aspirin daily (unless otherwise instructed)  7.) No tobacco use  8) Statin use     You have achieved 6 out of 8 of these and I am encouraging you to come in and get tuned up to achieve 8 out of 8!  Here is what you have achieved so far in my goals for you:  1.) HbA1C  less than 7:                              NO  Your last  HbA1C :  Lab Results   Component Value Date    A1C 10.7 02/10/2021    A1C 7.6 11/09/2020    A1C 7.9 08/07/2020    A1C 8.1  05/04/2020    A1C Test canceled by physician 02/04/2020       2.) Blood Pressure less than 140/80:       YES      Your last    BP Readings from Last 1 Encounters:   04/21/21 92/62     3.) LDL less than 100:                              YES      Your last     LDL Cholesterol Calculated   Date Value Ref Range Status   11/09/2020 74 <100 mg/dL Final     Comment:     Desirable:       <100 mg/dl       4.) Checked HbA1C in the past 6 months: YES      5.) Checked LDL in the past 12 months:    YES      6.) Taking one aspirin daily:                       YES     7.) No tobacco use:                                        NO   8.) Statin use      YES

## 2021-04-21 NOTE — PATIENT INSTRUCTIONS
Continue working on healthy eating and moving (start low and slow, work up to 30 min, 5x/week)    BG goals:  Fasting and before meals <130, >70  2 hour after eating <180    We only need 1/2 of these numbers to be within target then your A1c will be within target    Medication changes   Finish up your Vgo30, then start Vgo40    Increase your clicks to 4 before supper--if blood glucose is still high after supper--okay to use 5 clicks   3 before breakfast/lunch    Follow up   2 weeks    Call me sooner if any problems/concerns and/or questions develop including consistent low BGs <70 or consistent high BGs >200  [phone] (Unit Coordinator)    [phone] (Anna, Nurse)    Smoking:   Try keep working on reducing the amount of cigarettes     Keep checking your BG 4x/day

## 2021-04-21 NOTE — PROGRESS NOTES
"Chief Complaint   Patient presents with     Diabetes       Initial Pulse 88   Resp 16   Ht 1.6 m (5' 3\")   Wt 96.2 kg (212 lb 1.6 oz)   SpO2 90%   BMI 37.57 kg/m   Estimated body mass index is 37.57 kg/m  as calculated from the following:    Height as of this encounter: 1.6 m (5' 3\").    Weight as of this encounter: 96.2 kg (212 lb 1.6 oz).  Medication Reconciliation: WILFREDO Feldman is having a CGM study. Sensor agreement signed.    Sensor attached to right upper outer arm. No redness, drainage or bleeding noted. Patient instructed on testing schedule, how to complete the patient log, restrictions during wear and to remove if having an x-ray, MRI or CT.    Patient return date requested from Courtney Chaudhary.    Freestyle Bud Sensor:  Lot#:[id number]  Expiration Date: 09/30/21  Sensor S/N: [id number]    Patient's identity was verified by using patient's name and date of birth prior to insertion.    Sensor started: yes  2 minute re-check completed: yes    Written instructions and patient log given to patient.    Gege Walter,  "

## 2021-04-26 ENCOUNTER — DOCUMENTATION ONLY (OUTPATIENT)
Dept: SLEEP MEDICINE | Facility: CLINIC | Age: 56
End: 2021-04-26

## 2021-04-26 NOTE — PROGRESS NOTES
Marly returned her Auto Bip[employer] to [employer] showroom today due to non compliance. Was set up on 01/07/2021 and has met 0%.

## 2021-04-30 ENCOUNTER — PATIENT OUTREACH (OUTPATIENT)
Dept: CARE COORDINATION | Facility: OTHER | Age: 56
End: 2021-04-30

## 2021-04-30 ASSESSMENT — ACTIVITIES OF DAILY LIVING (ADL): DEPENDENT_IADLS:: INDEPENDENT

## 2021-04-30 NOTE — PROGRESS NOTES
Clinic Care Coordination Contact    Situation: Patient chart reviewed by care coordinator.    Background: Pt was in to see DM provider Courtney Chaudhary NP for follow-up on 4/21/21.    Assessment: Pt had glucose sensor inserted for CGM study on 4/1/21.  She had removed the sensor herself on 4/16/21 per instruction provided (see telephone encounter dated 4/12/21).  It was found that the sensor had not recorded any data.  A new sensor was placed at her visit to try again.  She will return on 5/5/21 for follow-up appt.      Plan/Recommendations: Pt to have DM and PCP follow-ups as scheduled:    PCP appt:    Next 5 appointments (look out 90 days)    May 10, 2021  2:10 PM  (Arrive by 1:55 PM)  Office Visit with Amberly Whyte NP  [facility] [address]  [place] [address]  [phone]          CC will continue to [facility].    Lauren Pipkin, BS-RN   Care Coordination  Primary Care- [facility]  [phone] Option # 1

## 2021-05-05 ENCOUNTER — ALLIED HEALTH/NURSE VISIT (OUTPATIENT)
Dept: EDUCATION SERVICES | Facility: OTHER | Age: 56
End: 2021-05-05
Attending: NURSE PRACTITIONER
Payer: COMMERCIAL

## 2021-05-05 VITALS
WEIGHT: 211.1 LBS | BODY MASS INDEX: 37.4 KG/M2 | HEIGHT: 63 IN | OXYGEN SATURATION: 92 % | HEART RATE: 89 BPM | SYSTOLIC BLOOD PRESSURE: 116 MMHG | DIASTOLIC BLOOD PRESSURE: 82 MMHG | RESPIRATION RATE: 16 BRPM

## 2021-05-05 DIAGNOSIS — Z79.4 TYPE 2 DIABETES MELLITUS WITH HYPERGLYCEMIA, WITH LONG-TERM CURRENT USE OF INSULIN (H): ICD-10-CM

## 2021-05-05 DIAGNOSIS — F17.200 NICOTINE DEPENDENCE, UNCOMPLICATED, UNSPECIFIED NICOTINE PRODUCT TYPE: Primary | ICD-10-CM

## 2021-05-05 DIAGNOSIS — E11.65 TYPE 2 DIABETES MELLITUS WITH HYPERGLYCEMIA, WITH LONG-TERM CURRENT USE OF INSULIN (H): ICD-10-CM

## 2021-05-05 PROCEDURE — G0463 HOSPITAL OUTPT CLINIC VISIT: HCPCS

## 2021-05-05 PROCEDURE — 99214 OFFICE O/P EST MOD 30 MIN: CPT | Mod: 25 | Performed by: NURSE PRACTITIONER

## 2021-05-05 PROCEDURE — 95251 CONT GLUC MNTR ANALYSIS I&R: CPT | Performed by: NURSE PRACTITIONER

## 2021-05-05 ASSESSMENT — PAIN SCALES - GENERAL: PAINLEVEL: NO PAIN (0)

## 2021-05-05 ASSESSMENT — MIFFLIN-ST. JEOR: SCORE: 1511.67

## 2021-05-05 NOTE — PROGRESS NOTES
SUBJECTIVE:  Marly Cannon, 55 year old, female presents with the following Chief Complaint(s) with HPI to follow:  Chief Complaint   Patient presents with     Diabetes        Diabetes Follow-up    Patient is checking blood sugars: 1-2x/day.  Results:  No meter      Symptoms of hypoglycemia (low blood sugar): none    Paresthesias (numbness or burning in feet) or sores: yes, no sores    Diabetic eye exam within the last year: UTD    Breakfast eaten regularly: sometimes    Patient counting carbs: Yes    Exercising: no          HPI:  Malry's here today for the follow up regarding her Diabetes mellitus, Type 2.    Lab Results   Component Value Date    A1C 10.7 02/10/2021    A1C 7.6 11/09/2020    A1C 7.9 08/07/2020    A1C 8.1 05/04/2020    A1C Test canceled by physician 02/04/2020     Current Diabetes medication:   1. Vgo30 --Novolog, 4 clicks with supper  2. Ozempic 1 mg weekly (Tuesday)  Statin use: yes, simvastatin 20 mg daily  ASA: yes, 81 mg daily    Marly's here today for her professional CGM study evaluation.    She reports the following:  Denies any issues with her V-go.   Reports she's changing it every 2-3 days.    States she's clicking 4 times with meals.      Denies any new health concerns      Patient Active Problem List   Diagnosis     Type 2 diabetes, HbA1c goal < 7% (H)     ACP (advance care planning)     Stage 3 chronic kidney disease     Pulmonary emphysema (H)     Hyperparathyroidism due to renal insufficiency (H)     Morbid obesity (H)     Vitamin D deficiency     Intellectual disability     Breast fibrocystic disorder     Tobacco abuse     Microalbuminuria due to type 2 diabetes mellitus (H)     H/O colonoscopy     [place] cardiac risk <10% in next 10 years     Tubular adenoma of colon     Hyperlipidemia, unspecified hyperlipidemia type     Essential hypertension     Callus of foot     Memory disturbance     ADEEL (obstructive sleep apnea)       History reviewed. No pertinent past medical  history.    Past Surgical History:   Procedure Laterality Date     COLONOSCOPY N/A 8/20/2015    Procedure: COLONOSCOPY;  Surgeon: Gentry Porter MD;  Location: [facility]     COLONOSCOPY N/A 9/21/2018    Procedure: COLONOSCOPY;  COLONOSCOPY WITH POLYPECTOMY;  Surgeon: Gentry Porter MD;  Location: [facility]       Family History   Problem Relation Age of Onset     Diabetes Mother      Diabetes Father      Hypertension Brother      Diabetes Sister        Social History     Tobacco Use     Smoking status: Current Every Day Smoker     Packs/day: 1.00     Years: 34.00     Pack years: 34.00     Types: Cigarettes     Start date: 1/1/1979     Smokeless tobacco: Never Used     Tobacco comment: Declined QP 05/13/2020   Substance Use Topics     Alcohol use: No     Alcohol/week: 0.0 standard drinks       Current Outpatient Medications   Medication Sig Dispense Refill     Acetaminophen (TYLENOL PO) Take 325 mg by mouth every 6 hours as needed        amLODIPine (NORVASC) 5 MG tablet Take 1 tablet (5 mg) by mouth daily 90 tablet 3     ammonium lactate (LAC-HYDRIN) 12 % cream Apply topically 2 times daily       aspirin (ASPIRIN LOW DOSE) 81 MG chewable tablet CHEW 1 TABLET BY MOUTH DAILY. DO NOT SPLIT OR CRUSH 90 tablet 3     atorvastatin (LIPITOR) 40 MG tablet Take 1 tablet (40 mg) by mouth At Bedtime 90 tablet 3     blood glucose (ONETOUCH ULTRA) test strip Use to test blood sugar 4 times daily 150 strip 3     budesonide-formoterol (SYMBICORT) 160-4.5 MCG/ACT Inhaler Inhale 2 puffs into the lungs 2 times daily 1 Inhaler 3     Cholecalciferol (VITAMIN D3) 50 MCG (2000 UT) CAPS TAKE 1 CAPSULE BY MOUTH DAILY 90 capsule 0      MG capsule TAKE 1 CAPSULE BY MOUTH DAILY 90 capsule 0     hydrocortisone (CORTAID) 1 % external cream Apply topically 2 times daily 453.6 g 0     hydrocortisone 2.5 % cream Apply topically 2 times daily       insulin aspart (NOVOLOG VIAL) 100 UNITS/ML vial To be used with the V40.  TDD: 76 units 40 mL  3     insulin pen needle (32G X 4 MM) 32G X 4 MM miscellaneous Use 1 pen needles daily 100 each 3     insulin syringes, disposable, (BD INSULIN SYRINGE) U-100 1 ML MISC 1 Device daily 30 each 2     ipratropium-albuterol (COMBIVENT RESPIMAT)  MCG/ACT inhaler INHALE 1 PUFF INTO THE LUNGS 4 TIMES DAILY 4 g 0     lisinopril (ZESTRIL) 40 MG tablet Take 1 tablet (40 mg) by mouth daily 90 tablet 3     Magnesium Cl-Calcium Carbonate 71.5-119 MG TBEC Take 2 tablets by mouth       nystatin (MYCOSTATIN) 005017 UNIT/GM external powder Apply topically 3 times daily 60 g 3     ONETOUCH DELICA LANCETS 33G MISC 1 each 3 times daily (Patient taking differently: 1 each 4 times daily ) 100 each 10     order for DME Women briefs 50 each 1     polyethylene glycol (MIRALAX) 17 g packet Take 17 g by mouth 3 times daily       primidone (MYSOLINE) 50 MG tablet TAKE 1 TABLET BY MOUTH AT BEDTIME 30 tablet 11     semaglutide (OZEMPIC, 1 MG/DOSE,) 2 MG/1.5ML pen Inject 1 mg Subcutaneous every 7 days 9 mL 1     tiZANidine (ZANAFLEX) 4 MG tablet Take 1 tablet (4 mg) by mouth 3 times daily as needed for muscle spasms 30 tablet 0     triamcinolone (KENALOG) 0.1 % cream Apply topically 2 times daily       wearable insulin delivery device ([employer] 40) kit Insulin delivery device to be changed every 24 hours 30 each 4     Continuous Blood Gluc  (FREESTYLE HANK 2 READER) AISHA 1 each continuous Used as directed by  (Patient not taking: Reported on 5/5/2021) 1 each 0     Continuous Blood Gluc Sensor (DEXCOM G6 SENSOR) MISC 1 each every 10 days Change every 10 days. (Patient not taking: Reported on 5/5/2021) 3 each 5     Continuous Blood Gluc Sensor (FREESTYLE HANK 2 SENSOR) MISC 1 each continuous (Patient not taking: Reported on 4/21/2021) 2 each 5     Continuous Blood Gluc Transmit (DEXCOM G6 TRANSMITTER) MISC 1 each every 3 months Change every 3 months. (Patient not taking: Reported on 4/21/2021) 1 each 1       No Known  "Allergies    REVIEW OF SYSTEMS  Skin: negative  Eyes: negative, wears glasses    Ears/Nose/Throat: negative  Respiratory: No shortness of breath or hemoptysis; smoker's cough; hx of sleep apnea--not wearing it (can't breath at night)  Cardiovascular: negative  Gastrointestinal: negative  Genitourinary: negative  Musculoskeletal: occasional left shoulder--killing her and left knee   Neurologic: once in a while--no change  Psychiatric: negative  Hematologic/Lymphatic/Immunologic: negative  Endocrine: positive for diabetes     OBJECTIVE:  /82   Pulse 89   Resp 16   Ht 1.6 m (5' 3\")   Wt 95.8 kg (211 lb 1.6 oz)   SpO2 92%   BMI 37.39 kg/m    Constitutional: healthy, alert and no distress  Musculoskeletal: extremities normal- no gross deformities noted and gait normal  Skin: no suspicious lesions or rashes to visible skin  Psychiatric: mentation appears normal and affect normal/bright    LABS  No results found for any visits on 05/05/21.    ASSESSMENT / PLAN:  (F17.200) Nicotine dependence, uncomplicated, unspecified nicotine product type  (primary encounter diagnosis)  Comment: noted and working on it  Plan:   Marly's aware to let us know when she's ready to quit smoking.  Discussed the dangers of smoking with diabetes    (E11.65,  Z79.4) Type 2 diabetes mellitus with hyperglycemia, with long-term current use of insulin (H)  Comment:   Noted the following CGM data:  Date: 4/21 to 5/5/21  Glucose management indicator: 13.5%    Average glucose: 425    Glucose range  Very low (<54): 0  low (<70): 0  In target range (): 0  High (>180): 4%  Very high (>250): 96%    Plan: GLUCOSE MONITOR, 72 HOUR, JOB [employer]          Reviewed her professional CGM study with Marly    She's not interested in a personal CGM at this time    As noted, Marly was changing her Vgo every 2-3 days.   It's suppose to be changed daily.   Education focused on this.     Follow up  4 weeks after starting her Vgo40    Keep working on " checking BGs 4x/day    Call with questions/concerns      Patient Instructions   Continue working on healthy eating and moving (start low and slow, work up to 30 min, 5x/week)    BG goals:  Fasting and before meals <130, >70  2 hour after eating <180    We only need 1/2 of these numbers to be within target then your A1c will be within target    Medication changes   Finish up your Vgo30  Change the Vgo30 every day    Then start Vgo40 on May 12th  Change the Vgo40 every day    Meals:  Snacks: 3 clicks  Small meals: 4 clicks  Big meals: 5 clicks    Follow up   1 month    Call me sooner if any problems/concerns and/or questions develop including consistent low BGs <70 or consistent high BGs >200  [phone] (Unit Coordinator)    [phone] (Anna, Nurse)    Smoking:   Try keep working on reducing the amount of cigarettes           Time: 30 minutes  Barrier: see PMH  Willingness to learn: accepting    Courtney PINTO [facility]-BC  Diabetes and Wound Care      Cc: Amberly Whyte NP    30 minutes was spent with patient.    All of this time was spent on counseling patient regarding illness, medication and/or treatment options, coordinating further cares and follow ups that are needed along with resource material that will be helpful in the treatment of these issues.     With the electronic record, we can now more quickly and easily track our patient diabetic goals. Our diabetes clinical review is in progress and these are the indicators we are monitoring for good diabetes health.     1.) HbA1C less than 7 (measurement of your average blood sugars)  2.) Blood Pressure less than 140/80  3.) LDL less than 100 (bad cholesterol)  4.) HbA1C is checked in the last 6 months and below 7% (more frequently if not at goal or adjusting medications)  5.) LDL is checked in the last 12 months (more frequently if not at goal or adjusting medications)  6.) Taking one baby aspirin daily (unless otherwise instructed)  7.) No tobacco use  8)  Statin use     You have achieved 6 out of 8 of these and I am encouraging you to come in and get tuned up to achieve 8 out of 8!  Here is what you have achieved so far in my goals for you:  1.) HbA1C  less than 7:                              NO  Your last  HbA1C :  Lab Results   Component Value Date    A1C 10.7 02/10/2021    A1C 7.6 11/09/2020    A1C 7.9 08/07/2020    A1C 8.1 05/04/2020    A1C Test canceled by physician 02/04/2020       2.) Blood Pressure less than 140/80:       YES      Your last    BP Readings from Last 1 Encounters:   05/05/21 116/82     3.) LDL less than 100:                              YES      Your last     LDL Cholesterol Calculated   Date Value Ref Range Status   11/09/2020 74 <100 mg/dL Final     Comment:     Desirable:       <100 mg/dl       4.) Checked HbA1C in the past 6 months: YES      5.) Checked LDL in the past 12 months:    YES      6.) Taking one aspirin daily:                       YES     7.) No tobacco use:                                        NO   8.) Statin use      YES

## 2021-05-05 NOTE — PATIENT INSTRUCTIONS
Continue working on healthy eating and moving (start low and slow, work up to 30 min, 5x/week)    BG goals:  Fasting and before meals <130, >70  2 hour after eating <180    We only need 1/2 of these numbers to be within target then your A1c will be within target    Medication changes   Finish up your Vgo30  Change the Vgo30 every day    Then start Vgo40 on May 12th  Change the Vgo40 every day    Meals:  Snacks: 3 clicks  Small meals: 4 clicks  Big meals: 5 clicks    Follow up   1 month    Call me sooner if any problems/concerns and/or questions develop including consistent low BGs <70 or consistent high BGs >200  [phone] (Unit Coordinator)    [phone] (Anna, Nurse)    Smoking:   Try keep working on reducing the amount of cigarettes

## 2021-05-06 ENCOUNTER — PATIENT OUTREACH (OUTPATIENT)
Dept: CARE COORDINATION | Facility: OTHER | Age: 56
End: 2021-05-06

## 2021-05-06 ENCOUNTER — PRENATAL OFFICE VISIT (OUTPATIENT)
Dept: CARE COORDINATION | Facility: OTHER | Age: 56
End: 2021-05-06
Payer: COMMERCIAL

## 2021-05-06 DIAGNOSIS — Z53.9 ERRONEOUS ENCOUNTER--DISREGARD: Primary | ICD-10-CM

## 2021-05-06 PROCEDURE — 10000001 PR ERRONEOUS ENCOUNTER--DISREGARD

## 2021-05-06 ASSESSMENT — ACTIVITIES OF DAILY LIVING (ADL): DEPENDENT_IADLS:: INDEPENDENT

## 2021-05-06 NOTE — PROGRESS NOTES
Assessment & Plan     Essential hypertension  Well controlled. Continue current medications. Encouraged daily exercise and a low sodium diet. Recommended checking BP's 2x/wk, call the clinic if consistantly s>140 or d>90. Follow up in 3 months.     Type 2 diabetes, HbA1c goal < 7% (H)  Poorly controlled. A1C over 14. [facility]. Will continue to do so. On statin. BP controlled. On asa. Eye exam. UTD. See me 3 months, sooner with new or worsening symptoms.     Hyperlipidemia, unspecified hyperlipidemia type  Tolerating statin. Will continue. Liver function normal today.     Microalbuminuria due to type 2 diabetes mellitus (H)  On ACE. Will continue.     Morbid obesity (H)  BMI 37. Diet and exercise encouraged.     Tobacco abuse  Cessation encouraged.     Pulmonary emphysema, unspecified emphysema type (H)  Stable. Continue current inhalers.     Acute pain of left shoulder  XR normal. Very limited ROM. Will perform MRI. Will notify patient of the results when available and intervene accordingly.     - XR SHOULDER LEFT G/E 2 VIEWS (Clinic Performed)  - MR Shoulder Left w/o Contrast; Future        Return in about 3 months (around 8/10/2021).    Amberly Whyte NP  [facility] - PORSHA Luke is a 55 year old who presents for the following health issues    HPI     Diabetes Follow-up    How often are you checking your blood sugar? Three times daily  Blood sugar testing frequency justification:  Uncontrolled diabetes  What time of day are you checking your blood sugars (select all that apply)?  morning, suppertyime and bedtime, she notes that her glucoses are around 100 throughout the day  Have you had any blood sugars above 200?  No  Have you had any blood sugars below 70?  No    What symptoms do you notice when your blood sugar is low?  None    What concerns do you have today about your diabetes? None     Do you have any of these symptoms? (Select all that apply)  No  numbness or tingling in feet.  No redness, sores or blisters on feet.  No complaints of excessive thirst.  No reports of blurry vision.  No significant changes to weight.    Have you had a diabetic eye exam in the last 12 months? No    A1C was 10.7 on 2/10/21. [facility]. Unclear if she is using her V-Go appropriately. Also using Ozempic once weekly. She has trouble giving herself the medication, but the home care nurse helps her.    On asa.     She denies chest pain, shortness of breath, dizziness, syncope, or palpitations.   -Microalbuminuria present. On ACE.   -She does have chronic kidney disease. Typically avoids NSAIDs.    Hyperlipidemia Follow-Up      Are you regularly taking any medication or supplement to lower your cholesterol?   Yes- atorvastatin 40 mg    Are you having muscle aches or other side effects that you think could be caused by your cholesterol lowering medication?  No     As noted above, she denies chest pain, shortness of breath, dizziness, syncope, or palpitations.     Hypertension Follow-up      Do you check your blood pressure regularly outside of the clinic? Yes     Are you following a low salt diet? No    Are your blood pressures ever more than 140 on the top number (systolic) OR more   than 90 on the bottom number (diastolic), for example 140/90? No   -Taking amlodipine and lisinopril without side effects.    -As noted above, she denies chest pain, shortness of breath, dizziness, syncope, or palpitations    BP Readings from Last 2 Encounters:   05/10/21 124/80   05/05/21 116/82     Hemoglobin A1C (%)   Date Value   02/10/2021 10.7 (H)   11/09/2020 7.6 (H)     LDL Cholesterol Calculated (mg/dL)   Date Value   11/09/2020 74   02/04/2020 49       COPD Follow-Up    Overall, how are your COPD symptoms since your last clinic visit?  No change    How much fatigue or shortness of breath do you have when you are walking?  Same as usual    How much shortness of breath do you have  when you are resting?  Same as usual    How often do you cough? Often    Have you noticed any change in your sputum/phlegm?  No    Have you experienced a recent fever? No    Please describe how far you can walk without stopping to rest:  Less than a mile    How many flights of stairs are you able to walk up without stopping?  2    Have you had any Emergency Room Visits, Urgent Care Visits, or Hospital Admissions because of your COPD since your last office visit?  No     Has Combivent and Symbicort ordered. She notes that she has been using these. Shortness of breath controlled when she uses the inhalers. Continues to smoke, smoking 1-2 ppd. No interest in quitting. No fevers. No blood in sputum. No unintentional weight loss.     History   Smoking Status     Current Every Day Smoker     Packs/day: 1.00     Years: 34.00     Types: Cigarettes     Start date: 1/1/1979   Smokeless Tobacco     Never Used     Comment: Declined QP 05/13/2020     No results found for: FEV1, [id number]    Musculoskeletal problem/pain-Left Shoulder Pain  Onset/Duration: 4 weeks, no known injury  Description  Location: shoulder - left  Joint Swelling: no  Redness: no  Pain: YES  Warmth: no  Intensity:  severe  Progression of Symptoms:  worsening  Accompanying signs and symptoms:   Fevers: no  Numbness/tingling/weakness: no  History  Trauma to the area: no  Recent illness:  no  Previous similar problem: no  Previous evaluation:  no  Precipitating or alleviating factors:  Aggravating factors include: movements at night  Therapies tried and outcome: nothing    Due for the Tdap and Hep B vaccines. Declines. Also declines the Covid vaccine.     Review of Systems   As noted in the HPI.       Objective    /80   Pulse 92   Temp 97  F (36.1  C) (Tympanic)   Wt 95.3 kg (210 lb)   SpO2 90%   BMI 37.20 kg/m    Body mass index is 37.2 kg/m .  Physical Exam   GENERAL: healthy, alert, no distress and over weight  EYES: Eyes grossly normal to  inspection, PERRL and conjunctivae and sclerae normal  HENT: ear canals and TM's normal, nose and mouth without ulcers or lesions  NECK: no adenopathy, no asymmetry, masses, or scars and thyroid normal to palpation  RESP: lungs clear to auscultation - no rales, rhonchi or wheezes  CV: regular rate and rhythm, normal S1 S2, no S3 or S4, no murmur, click or rub, no peripheral edema and peripheral pulses present  NEURO: Normal strength and tone, mentation intact and speech normal  PSYCH: mentation appears normal, affect normal/bright  LEFT SHOULDER: Skin intact. No erythema, edema, or ecchymosis. No pain with palpation over anterior joint. No pain over bursa. No pain over AC joint. Some mild pain with palpation over upper trapezius muscle. She does have very limited ROM, unable to flex, extend, adduct, abduct, or internal and external rotate without pain. Radial pulse 2+.   Diabetic foot exam: normal sensory exam, dry cracking feet-follows with podiatry, pulses are present, but diminished               Results for orders placed or performed in visit on 05/10/21   XR SHOULDER LEFT G/E 2 VIEWS (Clinic Performed)     Status: None    Narrative    PROCEDURE:  XR SHOULDER LEFT G/E 3 VIEWS    HISTORY: Acute pain of left shoulder    COMPARISON:  None.    TECHNIQUE:  4 views of the left shoulder were obtained.    FINDINGS:  No fracture or dislocation is identified. The joint spaces  are preserved.        Impression    IMPRESSION: Normal left shoulder      NOEMI DAVIS MD   Results for orders placed or performed in visit on 05/10/21   Albumin Random Urine Quantitative with Creat Ratio     Status: Abnormal   Result Value Ref Range    Creatinine Urine 86 mg/dL    Albumin Urine mg/L 1,060 mg/L    Albumin Urine mg/g Cr 1,235.43 (H) 0 - 25 mg/g Cr   Comprehensive metabolic panel (BMP + Alb, Alk Phos, ALT, AST, Total. Bili, TP)     Status: Abnormal   Result Value Ref Range    Sodium 134 133 - 144 mmol/L    Potassium 3.8 3.4 - 5.3  mmol/L    Chloride 100 94 - 109 mmol/L    Carbon Dioxide 32 20 - 32 mmol/L    Anion Gap 2 (L) 3 - 14 mmol/L    Glucose 344 (H) 70 - 99 mg/dL    Urea Nitrogen 19 7 - 30 mg/dL    Creatinine 0.96 0.52 - 1.04 mg/dL    GFR Estimate 65 >60 mL/min/[1.73_m2]    GFR Estimate If Black 76 >60 mL/min/[1.73_m2]    Calcium 9.7 8.5 - 10.1 mg/dL    Bilirubin Total 0.4 0.2 - 1.3 mg/dL    Albumin 3.6 3.4 - 5.0 g/dL    Protein Total 7.9 6.8 - 8.8 g/dL    Alkaline Phosphatase 95 40 - 150 U/L    ALT 18 0 - 50 U/L    AST 12 0 - 45 U/L   Hemoglobin A1c     Status: Abnormal   Result Value Ref Range    Hemoglobin A1C >14.0 (H) 0 - 5.6 %   Estimated Average Glucose     Status: None   Result Value Ref Range    Estimated Average Glucose NEG 47 mg/dL

## 2021-05-06 NOTE — PROGRESS NOTES
Clinic Care Coordination Contact  Care Team Conversations    CC received an update from Courtney Chaudhary NP following pt's clinic visit for DM follow-up on 5/5/21.    Pt reported to provider that she is changing her V-Go once every 2-3 days which isn't what she is supposed to be doing.  She should be changing this daily.  Courtney states pt does always come to appts with one placed and she has confirmed they do have insulin remaining in them when she has checked.  Courtney educated the pt that this must be changed daily for full effectiveness.  Would like to have home care reinforce if possible also.    CC has placed a call to pt's home care nurse Helene DEL ANGEL for her to call back at her convenience.  Would like to provide her with an update and ask if she could remind pt to make sure she is changing it daily as instructed.    Lauren Pipkin, SHIRA-RN   [facility]  [facility]  [phone] Option # 1

## 2021-05-10 ENCOUNTER — ANCILLARY PROCEDURE (OUTPATIENT)
Dept: GENERAL RADIOLOGY | Facility: OTHER | Age: 56
End: 2021-05-10
Attending: NURSE PRACTITIONER
Payer: COMMERCIAL

## 2021-05-10 ENCOUNTER — OFFICE VISIT (OUTPATIENT)
Dept: FAMILY MEDICINE | Facility: OTHER | Age: 56
End: 2021-05-10
Attending: NURSE PRACTITIONER
Payer: COMMERCIAL

## 2021-05-10 VITALS
WEIGHT: 210 LBS | BODY MASS INDEX: 37.2 KG/M2 | HEART RATE: 92 BPM | DIASTOLIC BLOOD PRESSURE: 80 MMHG | SYSTOLIC BLOOD PRESSURE: 124 MMHG | TEMPERATURE: 97 F | OXYGEN SATURATION: 90 %

## 2021-05-10 DIAGNOSIS — I10 ESSENTIAL HYPERTENSION: ICD-10-CM

## 2021-05-10 DIAGNOSIS — E11.9 TYPE 2 DIABETES, HBA1C GOAL < 7% (H): ICD-10-CM

## 2021-05-10 DIAGNOSIS — I10 ESSENTIAL HYPERTENSION: Primary | ICD-10-CM

## 2021-05-10 DIAGNOSIS — R80.9 MICROALBUMINURIA DUE TO TYPE 2 DIABETES MELLITUS (H): ICD-10-CM

## 2021-05-10 DIAGNOSIS — Z72.0 TOBACCO ABUSE: ICD-10-CM

## 2021-05-10 DIAGNOSIS — E11.29 MICROALBUMINURIA DUE TO TYPE 2 DIABETES MELLITUS (H): ICD-10-CM

## 2021-05-10 DIAGNOSIS — J43.9 PULMONARY EMPHYSEMA, UNSPECIFIED EMPHYSEMA TYPE (H): ICD-10-CM

## 2021-05-10 DIAGNOSIS — E78.5 HYPERLIPIDEMIA, UNSPECIFIED HYPERLIPIDEMIA TYPE: ICD-10-CM

## 2021-05-10 DIAGNOSIS — M25.512 ACUTE PAIN OF LEFT SHOULDER: ICD-10-CM

## 2021-05-10 DIAGNOSIS — E66.01 MORBID OBESITY (H): ICD-10-CM

## 2021-05-10 LAB
ALBUMIN SERPL-MCNC: 3.6 G/DL (ref 3.4–5)
ALP SERPL-CCNC: 95 U/L (ref 40–150)
ALT SERPL W P-5'-P-CCNC: 18 U/L (ref 0–50)
ANION GAP SERPL CALCULATED.3IONS-SCNC: 2 MMOL/L (ref 3–14)
AST SERPL W P-5'-P-CCNC: 12 U/L (ref 0–45)
BILIRUB SERPL-MCNC: 0.4 MG/DL (ref 0.2–1.3)
BUN SERPL-MCNC: 19 MG/DL (ref 7–30)
CALCIUM SERPL-MCNC: 9.7 MG/DL (ref 8.5–10.1)
CHLORIDE SERPL-SCNC: 100 MMOL/L (ref 94–109)
CO2 SERPL-SCNC: 32 MMOL/L (ref 20–32)
CREAT SERPL-MCNC: 0.96 MG/DL (ref 0.52–1.04)
CREAT UR-MCNC: 86 MG/DL
EST. AVERAGE GLUCOSE BLD GHB EST-MCNC: NORMAL MG/DL
GFR SERPL CREATININE-BSD FRML MDRD: 65 ML/MIN/{1.73_M2}
GLUCOSE SERPL-MCNC: 344 MG/DL (ref 70–99)
HBA1C MFR BLD: >14 % (ref 0–5.6)
MICROALBUMIN UR-MCNC: 1060 MG/L
MICROALBUMIN/CREAT UR: 1235.43 MG/G CR (ref 0–25)
POTASSIUM SERPL-SCNC: 3.8 MMOL/L (ref 3.4–5.3)
PROT SERPL-MCNC: 7.9 G/DL (ref 6.8–8.8)
SODIUM SERPL-SCNC: 134 MMOL/L (ref 133–144)

## 2021-05-10 PROCEDURE — 73030 X-RAY EXAM OF SHOULDER: CPT | Mod: TC,LT

## 2021-05-10 PROCEDURE — 82043 UR ALBUMIN QUANTITATIVE: CPT | Mod: ZL | Performed by: NURSE PRACTITIONER

## 2021-05-10 PROCEDURE — 999N001182 HC STATISTIC ESTIMATED AVERAGE GLUCOSE: Mod: ZL | Performed by: NURSE PRACTITIONER

## 2021-05-10 PROCEDURE — 99214 OFFICE O/P EST MOD 30 MIN: CPT | Performed by: NURSE PRACTITIONER

## 2021-05-10 PROCEDURE — 36415 COLL VENOUS BLD VENIPUNCTURE: CPT | Mod: ZL | Performed by: NURSE PRACTITIONER

## 2021-05-10 PROCEDURE — 80053 COMPREHEN METABOLIC PANEL: CPT | Mod: ZL | Performed by: NURSE PRACTITIONER

## 2021-05-10 PROCEDURE — 83036 HEMOGLOBIN GLYCOSYLATED A1C: CPT | Mod: ZL | Performed by: NURSE PRACTITIONER

## 2021-05-10 PROCEDURE — 36416 COLLJ CAPILLARY BLOOD SPEC: CPT | Mod: ZL | Performed by: NURSE PRACTITIONER

## 2021-05-10 PROCEDURE — G0463 HOSPITAL OUTPT CLINIC VISIT: HCPCS

## 2021-05-10 PROCEDURE — G0463 HOSPITAL OUTPT CLINIC VISIT: HCPCS | Mod: 25

## 2021-05-10 ASSESSMENT — PAIN SCALES - GENERAL: PAINLEVEL: WORST PAIN (10)

## 2021-05-10 NOTE — PROGRESS NOTES
Clinic Care Coordination Contact  Care Team Conversations    Incoming call received from pt's home care RN Myesha.  Updated her on Marly's 5/5 visit with Courtney Chaudhary NP.  Myesha will speak with Marly and reinforce education on the V-Go when she sees her tomorrow.  She will also monitor for proper use on an ongoing basis and let JOSH or Courtney know if there are further concerns or questions.    Lauren Pipkin, BS-RN   Care Coordination  Primary Care- [facility]  [phone] Option # 1

## 2021-05-11 ENCOUNTER — TELEPHONE (OUTPATIENT)
Dept: EDUCATION SERVICES | Facility: OTHER | Age: 56
End: 2021-05-11

## 2021-05-11 DIAGNOSIS — E11.65 TYPE 2 DIABETES MELLITUS WITH HYPERGLYCEMIA, WITH LONG-TERM CURRENT USE OF INSULIN (H): Primary | ICD-10-CM

## 2021-05-11 DIAGNOSIS — Z79.4 TYPE 2 DIABETES MELLITUS WITH HYPERGLYCEMIA, WITH LONG-TERM CURRENT USE OF INSULIN (H): Primary | ICD-10-CM

## 2021-05-11 NOTE — TELEPHONE ENCOUNTER
Called Marly and told her the following:     She should remove it and replace it was a new one.      MRI is on 5/26/21    Courtney PINTO FNP-BC  Diabetes and Wound Care

## 2021-05-11 NOTE — TELEPHONE ENCOUNTER
Pt has been ordered an MRI, should she take the Vgo off for the MRI? If so, should she put the same Vgo on or change to a new one?

## 2021-05-26 ENCOUNTER — TELEPHONE (OUTPATIENT)
Dept: FAMILY MEDICINE | Facility: OTHER | Age: 56
End: 2021-05-26

## 2021-05-26 ENCOUNTER — HOSPITAL ENCOUNTER (OUTPATIENT)
Dept: MRI IMAGING | Facility: HOSPITAL | Age: 56
Discharge: HOME | End: 2021-05-26
Attending: NURSE PRACTITIONER | Admitting: NURSE PRACTITIONER
Payer: COMMERCIAL

## 2021-05-26 DIAGNOSIS — M25.512 ACUTE PAIN OF LEFT SHOULDER: ICD-10-CM

## 2021-05-26 PROCEDURE — 73221 MRI JOINT UPR EXTREM W/O DYE: CPT | Mod: LT

## 2021-05-26 NOTE — TELEPHONE ENCOUNTER
Myesha from  home care called, requesting verbal orders to continue weekly nurse visit. Please advise. Thanks!    [phone]

## 2021-05-27 DIAGNOSIS — M25.512 ACUTE PAIN OF LEFT SHOULDER: Primary | ICD-10-CM

## 2021-06-09 ENCOUNTER — ALLIED HEALTH/NURSE VISIT (OUTPATIENT)
Dept: EDUCATION SERVICES | Facility: OTHER | Age: 56
End: 2021-06-09
Attending: NURSE PRACTITIONER
Payer: COMMERCIAL

## 2021-06-09 VITALS
SYSTOLIC BLOOD PRESSURE: 140 MMHG | BODY MASS INDEX: 35.77 KG/M2 | DIASTOLIC BLOOD PRESSURE: 72 MMHG | WEIGHT: 201.9 LBS | OXYGEN SATURATION: 91 % | HEIGHT: 63 IN | HEART RATE: 93 BPM

## 2021-06-09 DIAGNOSIS — Z79.4 TYPE 2 DIABETES MELLITUS WITH HYPERGLYCEMIA, WITH LONG-TERM CURRENT USE OF INSULIN (H): ICD-10-CM

## 2021-06-09 DIAGNOSIS — F17.200 NICOTINE DEPENDENCE, UNCOMPLICATED, UNSPECIFIED NICOTINE PRODUCT TYPE: Primary | ICD-10-CM

## 2021-06-09 DIAGNOSIS — E11.65 TYPE 2 DIABETES MELLITUS WITH HYPERGLYCEMIA, WITH LONG-TERM CURRENT USE OF INSULIN (H): ICD-10-CM

## 2021-06-09 PROCEDURE — G0463 HOSPITAL OUTPT CLINIC VISIT: HCPCS | Mod: 25

## 2021-06-09 PROCEDURE — 95250 CONT GLUC MNTR PHYS/QHP EQP: CPT | Performed by: NURSE PRACTITIONER

## 2021-06-09 PROCEDURE — G0463 HOSPITAL OUTPT CLINIC VISIT: HCPCS

## 2021-06-09 PROCEDURE — 99213 OFFICE O/P EST LOW 20 MIN: CPT | Performed by: NURSE PRACTITIONER

## 2021-06-09 ASSESSMENT — MIFFLIN-ST. JEOR: SCORE: 1469.94

## 2021-06-09 ASSESSMENT — PAIN SCALES - GENERAL: PAINLEVEL: NO PAIN (0)

## 2021-06-09 NOTE — PATIENT INSTRUCTIONS
Continue working on healthy eating and moving (start low and slow, work up to 30 min, 5x/week)    BG goals:  Fasting and before meals <130, >70  2 hour after eating <180    We only need 1/2 of these numbers to be within target then your A1c will be within target    Medication changes   Finish up your Vgo30  Change the Vgo30 every day    Then start Vgo40 on May 12th  Change the Vgo40 every day    Meals:  4 clicks for supper     Follow up   2 weeks    Call me sooner if any problems/concerns and/or questions develop including consistent low BGs <70 or consistent high BGs >200  [phone] (Unit Coordinator)    [phone] (Anna, Nurse)    Smoking:   Try keep working on reducing the amount of cigarettes

## 2021-06-09 NOTE — PROGRESS NOTES
SUBJECTIVE:  Marly Cannon, 55 year old, female presents with the following Chief Complaint(s) with HPI to follow:  Chief Complaint   Patient presents with     Diabetes        Diabetes Follow-up    Patient is checking blood sugars: 1-2x/day.  Results:  No meter      Symptoms of hypoglycemia (low blood sugar): none    Paresthesias (numbness or burning in feet) or sores: yes, no sores    Diabetic eye exam within the last year: UTD    Breakfast eaten regularly: sometimes    Patient counting carbs: Yes    Exercising: no          HPI:  Marly's here today for the follow up regarding her Diabetes mellitus, Type 2.    Lab Results   Component Value Date    A1C >14.0 05/10/2021    A1C 10.7 02/10/2021    A1C 7.6 11/09/2020    A1C 7.9 08/07/2020    A1C 8.1 05/04/2020     Current Diabetes medication:   1. Vgo30 --Novolog, 4 clicks with supper  2. Ozempic 1 mg weekly (Tuesday)    Statin use: yes, simvastatin 20 mg daily  ASA: yes, 81 mg daily    Marly's here today for her professional CGM study evaluation.    She reports the following:  The sensor fell off.      Denies any new health concerns    Decision to start a new sensor.      Patient Active Problem List   Diagnosis     Type 2 diabetes, HbA1c goal < 7% (H)     ACP (advance care planning)     Stage 3 chronic kidney disease     Pulmonary emphysema (H)     Hyperparathyroidism due to renal insufficiency (H)     Morbid obesity (H)     Vitamin D deficiency     Intellectual disability     Breast fibrocystic disorder     Tobacco abuse     Microalbuminuria due to type 2 diabetes mellitus (H)     H/O colonoscopy     [place] cardiac risk <10% in next 10 years     Tubular adenoma of colon     Hyperlipidemia, unspecified hyperlipidemia type     Essential hypertension     Callus of foot     Memory disturbance     ADEEL (obstructive sleep apnea)       History reviewed. No pertinent past medical history.    Past Surgical History:   Procedure Laterality Date     COLONOSCOPY N/A  8/20/2015    Procedure: COLONOSCOPY;  Surgeon: Gentry Porter MD;  Location: [facility]     COLONOSCOPY N/A 9/21/2018    Procedure: COLONOSCOPY;  COLONOSCOPY WITH POLYPECTOMY;  Surgeon: Gentry Porter MD;  Location: [facility]       Family History   Problem Relation Age of Onset     Diabetes Mother      Diabetes Father      Hypertension Brother      Diabetes Sister        Social History     Tobacco Use     Smoking status: Current Every Day Smoker     Packs/day: 1.00     Years: 34.00     Pack years: 34.00     Types: Cigarettes     Start date: 1/1/1979     Smokeless tobacco: Never Used     Tobacco comment: Declined QP 05/13/2020   Substance Use Topics     Alcohol use: No     Alcohol/week: 0.0 standard drinks       Current Outpatient Medications   Medication Sig Dispense Refill     amLODIPine (NORVASC) 5 MG tablet Take 1 tablet (5 mg) by mouth daily 90 tablet 3     ammonium lactate (LAC-HYDRIN) 12 % cream Apply topically 2 times daily       aspirin (ASPIRIN LOW DOSE) 81 MG chewable tablet CHEW 1 TABLET BY MOUTH DAILY. DO NOT SPLIT OR CRUSH 90 tablet 3     atorvastatin (LIPITOR) 40 MG tablet Take 1 tablet (40 mg) by mouth At Bedtime 90 tablet 3     blood glucose (ONETOUCH ULTRA) test strip Use to test blood sugar 4 times daily 150 strip 3     budesonide-formoterol (SYMBICORT) 160-4.5 MCG/ACT Inhaler Inhale 2 puffs into the lungs 2 times daily 1 Inhaler 3     Cholecalciferol (VITAMIN D3) 50 MCG (2000 UT) CAPS TAKE 1 CAPSULE BY MOUTH DAILY 90 capsule 3      MG capsule TAKE 1 CAPSULE BY MOUTH DAILY 90 capsule 0     empagliflozin (JARDIANCE) 10 MG TABS tablet Take 1 tablet (10 mg) by mouth daily 30 tablet 0     hydrocortisone (CORTAID) 1 % external cream Apply topically 2 times daily 453.6 g 0     hydrocortisone 2.5 % cream Apply topically 2 times daily       insulin aspart (NOVOLOG VIAL) 100 UNITS/ML vial To be used with the V40.  TDD: 76 units 40 mL 3     insulin pen needle (32G X 4 MM) 32G X 4 MM miscellaneous  Use 1 pen needles daily 100 each 3     insulin syringes, disposable, (BD INSULIN SYRINGE) U-100 1 ML MISC 1 Device daily 30 each 2     ipratropium-albuterol (COMBIVENT RESPIMAT)  MCG/ACT inhaler INHALE 1 PUFF INTO THE LUNGS 4 TIMES DAILY 4 g 0     lisinopril (ZESTRIL) 40 MG tablet Take 1 tablet (40 mg) by mouth daily 90 tablet 3     Magnesium Cl-Calcium Carbonate 71.5-119 MG TBEC Take 2 tablets by mouth       nystatin (MYCOSTATIN) 962551 UNIT/GM external powder Apply topically 3 times daily 60 g 3     ONETOUCH DELICA LANCETS 33G MISC 1 each 3 times daily (Patient taking differently: 1 each 4 times daily ) 100 each 10     order for DME Women briefs 50 each 1     polyethylene glycol (MIRALAX) 17 g packet Take 17 g by mouth 3 times daily       primidone (MYSOLINE) 50 MG tablet TAKE 1 TABLET BY MOUTH AT BEDTIME 30 tablet 11     semaglutide (OZEMPIC, 1 MG/DOSE,) 2 MG/1.5ML pen Inject 1 mg Subcutaneous every 7 days 9 mL 1     tiZANidine (ZANAFLEX) 4 MG tablet Take 1 tablet (4 mg) by mouth 3 times daily as needed for muscle spasms 30 tablet 0     triamcinolone (KENALOG) 0.1 % cream Apply topically 2 times daily       wearable insulin delivery device ([employer] 40) kit Insulin delivery device to be changed every 24 hours 30 each 4     Acetaminophen (TYLENOL PO) Take 325 mg by mouth every 6 hours as needed          No Known Allergies    REVIEW OF SYSTEMS  Skin: negative  Eyes: negative, wears glasses    Ears/Nose/Throat: negative  Respiratory: No shortness of breath or hemoptysis; smoker's cough; hx of sleep apnea--not wearing it (can't breath at night)  Cardiovascular: negative  Gastrointestinal: negative  Genitourinary: negative  Musculoskeletal: occasional left shoulder--killing her and left knee   Neurologic: once in a while--no change  Psychiatric: negative  Hematologic/Lymphatic/Immunologic: negative  Endocrine: positive for diabetes     OBJECTIVE:  BP (!) 140/72 (BP Location: Left arm, Cuff Size: Adult Large)    "Pulse 93   Ht 1.6 m (5' 3\")   Wt 91.6 kg (201 lb 14.4 oz)   SpO2 91%   BMI 35.76 kg/m    Constitutional: healthy, alert and no distress  Musculoskeletal: extremities normal- no gross deformities noted and gait normal  Skin: no suspicious lesions or rashes to visible skin  Psychiatric: mentation appears normal and affect normal/bright    LABS  No results found for any visits on 06/09/21.    ASSESSMENT / PLAN:  (E11.65,  Z79.4) Type 2 diabetes mellitus with hyperglycemia, with long-term current use of insulin (H)  Comment: noted A1c  Plan: empagliflozin (JARDIANCE) 10 MG TABS tablet,         GLUCOSE MONITORING CONTINUOUS, >=72 HR          I'm not sure what's going on  She's wearing her Vgo--there's insulin  Tells me she's entering boluses and changing it every day    Will start Jardiance and watch kidneys.    Reviewed possible side effects to report    (F17.200) Nicotine dependence, uncomplicated, unspecified nicotine product type  (primary encounter diagnosis)  Comment: noted and refused  Plan:   Spoke with patient regarding options for smoking cessation.  Various pharmacologic options and behavioral techniques were reviewed.  The relative effectiveness as well as risks and benefits were reviewed with patient.  Patient is interested in: refused    Smoking Cessation    Follow up  2 weeks      Patient Instructions   Continue working on healthy eating and moving (start low and slow, work up to 30 min, 5x/week)    BG goals:  Fasting and before meals <130, >70  2 hour after eating <180    We only need 1/2 of these numbers to be within target then your A1c will be within target    Medication changes   Finish up your Vgo30  Change the Vgo30 every day    Then start Vgo40 on May 12th  Change the Vgo40 every day    Meals:  4 clicks for supper     Follow up   2 weeks    Call me sooner if any problems/concerns and/or questions develop including consistent low BGs <70 or consistent high BGs >200  [phone] (Unit " Coordinator)    [phone] (Anna, Nurse)    Smoking:   Try keep working on reducing the amount of cigarettes           Time: 30 minutes  Barrier: see [facility]  Willingness to learn: accepting    Courtney PINTO [facility]  Diabetes and Wound Care      Cc: Amberly Whyte NP    With the electronic record, we can now more quickly and easily track our patient diabetic goals. Our diabetes clinical review is in progress and these are the indicators we are monitoring for good diabetes health.     1.) HbA1C less than 7 (measurement of your average blood sugars)  2.) Blood Pressure less than 140/80  3.) LDL less than 100 (bad cholesterol)  4.) HbA1C is checked in the last 6 months and below 7% (more frequently if not at goal or adjusting medications)  5.) LDL is checked in the last 12 months (more frequently if not at goal or adjusting medications)  6.) Taking one baby aspirin daily (unless otherwise instructed)  7.) No tobacco use  8) Statin use     You have achieved 5 out of 8 of these and I am encouraging you to come in and get tuned up to achieve 8 out of 8!  Here is what you have achieved so far in my goals for you:  1.) HbA1C  less than 7:                              NO  Your last  HbA1C :  Lab Results   Component Value Date    A1C >14.0 05/10/2021    A1C 10.7 02/10/2021    A1C 7.6 11/09/2020    A1C 7.9 08/07/2020    A1C 8.1 05/04/2020     2.) Blood Pressure less than 140/80:       NO      Your last    BP Readings from Last 1 Encounters:   06/09/21 (!) 140/72     3.) LDL less than 100:                              YES      Your last     LDL Cholesterol Calculated   Date Value Ref Range Status   11/09/2020 74 <100 mg/dL Final     Comment:     Desirable:       <100 mg/dl       4.) Checked HbA1C in the past 6 months: YES      5.) Checked LDL in the past 12 months:    YES      6.) Taking one aspirin daily:                       YES     7.) No tobacco use:                                        NO   8.) Statin use      YES

## 2021-06-23 ENCOUNTER — ALLIED HEALTH/NURSE VISIT (OUTPATIENT)
Dept: EDUCATION SERVICES | Facility: OTHER | Age: 56
End: 2021-06-23
Attending: NURSE PRACTITIONER
Payer: COMMERCIAL

## 2021-06-23 VITALS
SYSTOLIC BLOOD PRESSURE: 126 MMHG | OXYGEN SATURATION: 91 % | DIASTOLIC BLOOD PRESSURE: 85 MMHG | RESPIRATION RATE: 16 BRPM | BODY MASS INDEX: 35.24 KG/M2 | HEART RATE: 85 BPM | HEIGHT: 63 IN | WEIGHT: 198.9 LBS

## 2021-06-23 DIAGNOSIS — E11.65 TYPE 2 DIABETES MELLITUS WITH HYPERGLYCEMIA, WITH LONG-TERM CURRENT USE OF INSULIN (H): ICD-10-CM

## 2021-06-23 DIAGNOSIS — F17.200 NICOTINE DEPENDENCE, UNCOMPLICATED, UNSPECIFIED NICOTINE PRODUCT TYPE: Primary | ICD-10-CM

## 2021-06-23 DIAGNOSIS — Z79.4 TYPE 2 DIABETES MELLITUS WITH HYPERGLYCEMIA, WITH LONG-TERM CURRENT USE OF INSULIN (H): ICD-10-CM

## 2021-06-23 PROCEDURE — 99213 OFFICE O/P EST LOW 20 MIN: CPT | Performed by: NURSE PRACTITIONER

## 2021-06-23 PROCEDURE — 36415 COLL VENOUS BLD VENIPUNCTURE: CPT | Mod: ZL | Performed by: NURSE PRACTITIONER

## 2021-06-23 PROCEDURE — G0463 HOSPITAL OUTPT CLINIC VISIT: HCPCS

## 2021-06-23 PROCEDURE — 80048 BASIC METABOLIC PNL TOTAL CA: CPT | Mod: ZL | Performed by: NURSE PRACTITIONER

## 2021-06-23 ASSESSMENT — MIFFLIN-ST. JEOR: SCORE: 1456.33

## 2021-06-23 ASSESSMENT — PAIN SCALES - GENERAL: PAINLEVEL: NO PAIN (0)

## 2021-06-23 NOTE — PATIENT INSTRUCTIONS
Continue working on healthy eating and moving (start low and slow, work up to 30 min, 5x/week)    BG goals:  Fasting and before meals <130, >70  2 hour after eating <180    We only need 1/2 of these numbers to be within target then your A1c will be within target    Medication changes   1.  Vgo 40  Meals:  4 clicks     2.  Jardiance   Finish your 10 mg daily tablets.     I would like to increase them to 25 mg daily tablets when you are done.      Follow up   3-4 weeks    Call me sooner if any problems/concerns and/or questions develop including consistent low BGs <70 or consistent high BGs >200  [phone] (Unit Coordinator)    [phone] (Anna, Nurse)    Smoking:   Try keep working on reducing the amount of cigarettes

## 2021-06-23 NOTE — PROGRESS NOTES
SUBJECTIVE:  Marly Cannon, 55 year old, female presents with the following Chief Complaint(s) with HPI to follow:  Chief Complaint   Patient presents with     Diabetes        Diabetes Follow-up    Patient is checking blood sugars: 1-2x/day.  Results:  No meter      Symptoms of hypoglycemia (low blood sugar): none    Paresthesias (numbness or burning in feet) or sores: yes, no sores    Diabetic eye exam within the last year: UTD    Breakfast eaten regularly: sometimes    Patient counting carbs: Yes    Exercising: no          HPI:  Marly's here today for the follow up regarding her Diabetes mellitus, Type 2.    Lab Results   Component Value Date    A1C >14.0 05/10/2021    A1C 10.7 02/10/2021    A1C 7.6 11/09/2020    A1C 7.9 08/07/2020    A1C 8.1 05/04/2020     Current Diabetes medication:   1. Vgo40 --Novolog, 4 clicks with meals  2. Ozempic 1 mg weekly (Tuesday)  3.  Jardiance 10 mg daily  Statin use: yes, simvastatin 20 mg daily  ASA: yes, 81 mg daily    Marly's here today for her professional CGM study evaluation.    Reports the following:  No changes with her health  Received a cortisone shot a month ago for her left shoulder.  It's helping    Denies any issues with her Jardiance.     Unfortunately, her CGM study fell off after 8 hours.      Wt Readings from Last 4 Encounters:   06/23/21 90.2 kg (198 lb 14.4 oz)   06/09/21 91.6 kg (201 lb 14.4 oz)   05/10/21 95.3 kg (210 lb)   05/05/21 95.8 kg (211 lb 1.6 oz)           Patient Active Problem List   Diagnosis     Type 2 diabetes, HbA1c goal < 7% (H)     ACP (advance care planning)     Stage 3 chronic kidney disease     Pulmonary emphysema (H)     Hyperparathyroidism due to renal insufficiency (H)     Morbid obesity (H)     Vitamin D deficiency     Intellectual disability     Breast fibrocystic disorder     Tobacco abuse     Microalbuminuria due to type 2 diabetes mellitus (H)     H/O colonoscopy     [place] cardiac risk <10% in next 10 years     Tubular  adenoma of colon     Hyperlipidemia, unspecified hyperlipidemia type     Essential hypertension     Callus of foot     Memory disturbance     ADEEL (obstructive sleep apnea)       No past medical history on file.    Past Surgical History:   Procedure Laterality Date     COLONOSCOPY N/A 8/20/2015    Procedure: COLONOSCOPY;  Surgeon: Gentry Porter MD;  [facility]: [facility]     COLONOSCOPY N/A 9/21/2018    Procedure: COLONOSCOPY;  COLONOSCOPY WITH POLYPECTOMY;  Surgeon: Gentry Porter MD;  Location: [facility]       Family History   Problem Relation Age of Onset     Diabetes Mother      Diabetes Father      Hypertension Brother      Diabetes Sister        Social History     Tobacco Use     Smoking status: Current Every Day Smoker     Packs/day: 1.00     Years: 34.00     Pack years: 34.00     Types: Cigarettes     Start date: 1/1/1979     Smokeless tobacco: Never Used     Tobacco comment: Declined QP 05/13/2020   Substance Use Topics     Alcohol use: No     Alcohol/week: 0.0 standard drinks       Current Outpatient Medications   Medication Sig Dispense Refill     Acetaminophen (TYLENOL PO) Take 325 mg by mouth every 6 hours as needed        amLODIPine (NORVASC) 5 MG tablet Take 1 tablet (5 mg) by mouth daily 90 tablet 3     ammonium lactate (LAC-HYDRIN) 12 % cream Apply topically 2 times daily       aspirin (ASPIRIN LOW DOSE) 81 MG chewable tablet CHEW 1 TABLET BY MOUTH DAILY. DO NOT SPLIT OR CRUSH 90 tablet 3     atorvastatin (LIPITOR) 40 MG tablet Take 1 tablet (40 mg) by mouth At Bedtime 90 tablet 3     blood glucose (ONETOUCH ULTRA) test strip Use to test blood sugar 4 times daily 150 strip 3     budesonide-formoterol (SYMBICORT) 160-4.5 MCG/ACT Inhaler Inhale 2 puffs into the lungs 2 times daily 1 Inhaler 3     Cholecalciferol (VITAMIN D3) 50 MCG (2000 UT) CAPS TAKE 1 CAPSULE BY MOUTH DAILY 90 capsule 3      MG capsule TAKE 1 CAPSULE BY MOUTH DAILY 90 capsule 0     empagliflozin (JARDIANCE) 10 MG TABS  tablet Take 1 tablet (10 mg) by mouth daily 30 tablet 0     hydrocortisone (CORTAID) 1 % external cream Apply topically 2 times daily 453.6 g 0     hydrocortisone 2.5 % cream Apply topically 2 times daily       insulin aspart (NOVOLOG VIAL) 100 UNITS/ML vial To be used with the V40.  TDD: 76 units 40 mL 3     insulin pen needle (32G X 4 MM) 32G X 4 MM miscellaneous Use 1 pen needles daily 100 each 3     insulin syringes, disposable, (BD INSULIN SYRINGE) U-100 1 ML MISC 1 Device daily 30 each 2     ipratropium-albuterol (COMBIVENT RESPIMAT)  MCG/ACT inhaler INHALE 1 PUFF INTO THE LUNGS 4 TIMES DAILY 4 g 0     lisinopril (ZESTRIL) 40 MG tablet Take 1 tablet (40 mg) by mouth daily 90 tablet 3     Magnesium Cl-Calcium Carbonate 71.5-119 MG TBEC Take 2 tablets by mouth       nystatin (MYCOSTATIN) 554347 UNIT/GM external powder Apply topically 3 times daily 60 g 3     ONETOUCH DELICA LANCETS 33G MISC 1 each 3 times daily (Patient taking differently: 1 each 4 times daily ) 100 each 10     order for DME Women briefs 50 each 1     polyethylene glycol (MIRALAX) 17 g packet Take 17 g by mouth 3 times daily       primidone (MYSOLINE) 50 MG tablet TAKE 1 TABLET BY MOUTH AT BEDTIME 30 tablet 11     semaglutide (OZEMPIC, 1 MG/DOSE,) 2 MG/1.5ML pen Inject 1 mg Subcutaneous every 7 days 9 mL 1     tiZANidine (ZANAFLEX) 4 MG tablet Take 1 tablet (4 mg) by mouth 3 times daily as needed for muscle spasms 30 tablet 0     triamcinolone (KENALOG) 0.1 % cream Apply topically 2 times daily       wearable insulin delivery device ([employer] 40) kit Insulin delivery device to be changed every 24 hours 30 each 4       No Known Allergies    REVIEW OF SYSTEMS  Skin: negative  Eyes: negative, wears glasses    Ears/Nose/Throat: negative  Respiratory: No shortness of breath or hemoptysis; smoker's cough; hx of sleep apnea--not wearing it (can't breath at night)  Cardiovascular: negative  Gastrointestinal: negative  Genitourinary:  "negative  Musculoskeletal: occasional left shoulder--cortison shot is helping; and left knee   Neurologic: HA--once in a while  Psychiatric: negative  Hematologic/Lymphatic/Immunologic: negative  Endocrine: positive for diabetes     OBJECTIVE:  /85   Pulse 85   Resp 16   Ht 1.6 m (5' 3\")   Wt 90.2 kg (198 lb 14.4 oz)   SpO2 91%   BMI 35.23 kg/m    Constitutional: healthy, alert and no distress  Musculoskeletal: extremities normal- no gross deformities noted and gait normal  Skin: no suspicious lesions or rashes to visible skin  Psychiatric: mentation appears normal and affect normal/bright    LABS  No results found for any visits on 06/23/21.    ASSESSMENT / PLAN:  (F17.200) Nicotine dependence, uncomplicated, unspecified nicotine product type  (primary encounter diagnosis)  Comment: noted and refused  Plan:   Spoke with patient regarding options for smoking cessation.  Various pharmacologic options and behavioral techniques were reviewed.  The relative effectiveness as well as risks and benefits were reviewed with patient.  Patient is interested in: refused    Smoking Cessation      (E11.65,  Z79.4) Type 2 diabetes mellitus with hyperglycemia, with long-term current use of insulin (H)  Comment: noted the 8 hours recorded (Bg 218) and A1c  Plan: Basic metabolic panel          Marly isn't sure if she's taking her Jardiance.   Medications are set up through [facility].  LM for KHADIJAH Brunner.     I would like to increase her Jardiance to 25 mg daily when she's done with the 10 mg tablets.   Will check kidney function    Encouraged her to keep changing the Vgo40 daily  Keep doing 4 clicks before meals.     No change in Ozempic    Patient Instructions   Continue working on healthy eating and moving (start low and slow, work up to 30 min, 5x/week)    BG goals:  Fasting and before meals <130, >70  2 hour after eating <180    We only need 1/2 of these numbers to be within target then your A1c will be " within target    Medication changes   1.  Vgo 40  Meals:  4 clicks     2.  Jardiance   Finish your 10 mg daily tablets.     I would like to increase them to 25 mg daily tablets when you are done.      Follow up   3-4 weeks    Call me sooner if any problems/concerns and/or questions develop including consistent low BGs <70 or consistent high BGs >200  [phone] (Unit Coordinator)    [phone] (Anna, Nurse)    Smoking:   Try keep working on reducing the amount of cigarettes           Time: 30 minutes  Barrier: see PMH  Willingness to learn: accepting    Courtney PINTO [facility]  Diabetes and Wound Care      Cc: Amberly Whyte NP    With the electronic record, we can now more quickly and easily track our patient diabetic goals. Our diabetes clinical review is in progress and these are the indicators we are monitoring for good diabetes health.     1.) HbA1C less than 7 (measurement of your average blood sugars)  2.) Blood Pressure less than 140/80  3.) LDL less than 100 (bad cholesterol)  4.) HbA1C is checked in the last 6 months and below 7% (more frequently if not at goal or adjusting medications)  5.) LDL is checked in the last 12 months (more frequently if not at goal or adjusting medications)  6.) Taking one baby aspirin daily (unless otherwise instructed)  7.) No tobacco use  8) Statin use     You have achieved 6 out of 8 of these and I am encouraging you to come in and get tuned up to achieve 8 out of 8!  Here is what you have achieved so far in my goals for you:  1.) HbA1C  less than 7:                              NO  Your last  HbA1C :  Lab Results   Component Value Date    A1C >14.0 05/10/2021    A1C 10.7 02/10/2021    A1C 7.6 11/09/2020    A1C 7.9 08/07/2020    A1C 8.1 05/04/2020     2.) Blood Pressure less than 140/80:       YES      Your last    BP Readings from Last 1 Encounters:   06/23/21 126/85     3.) LDL less than 100:                              YES      Your last     LDL Cholesterol  Calculated   Date Value Ref Range Status   11/09/2020 74 <100 mg/dL Final     Comment:     Desirable:       <100 mg/dl       4.) Checked HbA1C in the past 6 months: YES      5.) Checked LDL in the past 12 months:    YES      6.) Taking one aspirin daily:                       YES     7.) No tobacco use:                                        NO   8.) Statin use      YES

## 2021-06-23 NOTE — PROGRESS NOTES
"Chief Complaint   Patient presents with     Diabetes       Initial Pulse 85   Resp 16   Ht 1.6 m (5' 3\")   Wt 90.2 kg (198 lb 14.4 oz)   SpO2 91%   BMI 35.23 kg/m   Estimated body mass index is 35.23 kg/m  as calculated from the following:    Height as of this encounter: 1.6 m (5' 3\").    Weight as of this encounter: 90.2 kg (198 lb 14.4 oz).  Medication Reconciliation: complete  Gege Walter LPN    "

## 2021-06-24 LAB
ANION GAP SERPL CALCULATED.3IONS-SCNC: 14 MMOL/L (ref 3–14)
BUN SERPL-MCNC: 27 MG/DL (ref 7–30)
CALCIUM SERPL-MCNC: 9.9 MG/DL (ref 8.5–10.1)
CHLORIDE SERPL-SCNC: 106 MMOL/L (ref 94–109)
CO2 SERPL-SCNC: 18 MMOL/L (ref 20–32)
CREAT SERPL-MCNC: 1.07 MG/DL (ref 0.52–1.04)
GFR SERPL CREATININE-BSD FRML MDRD: 57 ML/MIN/{1.73_M2}
GLUCOSE SERPL-MCNC: 156 MG/DL (ref 70–99)
POTASSIUM SERPL-SCNC: 4.7 MMOL/L (ref 3.4–5.3)
SODIUM SERPL-SCNC: 138 MMOL/L (ref 133–144)

## 2021-06-25 DIAGNOSIS — J43.9 PULMONARY EMPHYSEMA, UNSPECIFIED EMPHYSEMA TYPE (H): ICD-10-CM

## 2021-06-28 DIAGNOSIS — K59.00 CONSTIPATION, UNSPECIFIED CONSTIPATION TYPE: ICD-10-CM

## 2021-06-29 RX ORDER — IPRATROPIUM BROMIDE AND ALBUTEROL 20; 100 UG/1; UG/1
SPRAY, METERED RESPIRATORY (INHALATION)
Qty: 4 G | Refills: 0 | Status: SHIPPED | OUTPATIENT
Start: 2021-06-29 | End: 2021-09-20

## 2021-06-29 RX ORDER — DOCUSATE SODIUM 100 MG/1
CAPSULE, LIQUID FILLED ORAL
Qty: 90 CAPSULE | Refills: 0 | Status: SHIPPED | OUTPATIENT
Start: 2021-06-29 | End: 2021-10-21

## 2021-06-29 NOTE — TELEPHONE ENCOUNTER
MG capsule      Last Written Prescription Date:  2/16/2021  Last Fill Quantity: 90,   # refills: 0  Last Office Visit: 5/10/2021  Future Office visit:    Next 5 appointments (look out 90 days)    Aug 17, 2021  2:30 PM  (Arrive by 2:15 PM)  SHORT with Amberly Whyte NP  [facility] ([facility] [place] ) [address]  [place] [address]  [phone]

## 2021-07-01 ENCOUNTER — ANCILLARY PROCEDURE (OUTPATIENT)
Dept: MAMMOGRAPHY | Facility: OTHER | Age: 56
End: 2021-07-01
Attending: NURSE PRACTITIONER
Payer: COMMERCIAL

## 2021-07-01 DIAGNOSIS — Z12.31 VISIT FOR SCREENING MAMMOGRAM: ICD-10-CM

## 2021-07-01 PROCEDURE — 77063 BREAST TOMOSYNTHESIS BI: CPT | Mod: TC

## 2021-07-06 DIAGNOSIS — E11.9 TYPE 2 DIABETES, HBA1C GOAL < 7% (H): Primary | ICD-10-CM

## 2021-07-06 NOTE — PROGRESS NOTES
Request for refill.     Increase her Jardiance to 25 mg daily as discussed in previous appointment.     Has an appt on July 14th.     Courtney PINTO FNP-BC  Diabetes and Wound Care

## 2021-07-14 ENCOUNTER — ALLIED HEALTH/NURSE VISIT (OUTPATIENT)
Dept: EDUCATION SERVICES | Facility: OTHER | Age: 56
End: 2021-07-14
Attending: NURSE PRACTITIONER
Payer: COMMERCIAL

## 2021-07-14 VITALS
SYSTOLIC BLOOD PRESSURE: 118 MMHG | WEIGHT: 197.5 LBS | DIASTOLIC BLOOD PRESSURE: 74 MMHG | BODY MASS INDEX: 34.99 KG/M2 | HEIGHT: 63 IN | OXYGEN SATURATION: 91 % | HEART RATE: 83 BPM

## 2021-07-14 DIAGNOSIS — Z79.4 TYPE 2 DIABETES MELLITUS WITH HYPERGLYCEMIA, WITH LONG-TERM CURRENT USE OF INSULIN (H): ICD-10-CM

## 2021-07-14 DIAGNOSIS — E11.65 TYPE 2 DIABETES MELLITUS WITH HYPERGLYCEMIA, WITH LONG-TERM CURRENT USE OF INSULIN (H): ICD-10-CM

## 2021-07-14 DIAGNOSIS — F17.200 NICOTINE DEPENDENCE, UNCOMPLICATED, UNSPECIFIED NICOTINE PRODUCT TYPE: Primary | ICD-10-CM

## 2021-07-14 PROCEDURE — 99213 OFFICE O/P EST LOW 20 MIN: CPT | Performed by: NURSE PRACTITIONER

## 2021-07-14 PROCEDURE — G0463 HOSPITAL OUTPT CLINIC VISIT: HCPCS

## 2021-07-14 ASSESSMENT — PAIN SCALES - GENERAL: PAINLEVEL: NO PAIN (0)

## 2021-07-14 ASSESSMENT — MIFFLIN-ST. JEOR: SCORE: 1449.98

## 2021-07-14 NOTE — PROGRESS NOTES
SUBJECTIVE:  Marly Cannon, 55 year old, female presents with the following Chief Complaint(s) with HPI to follow:  Chief Complaint   Patient presents with     Diabetes        Diabetes Follow-up    Patient is checking blood sugars: 1-2x/day.  Results:  No meter      Symptoms of hypoglycemia (low blood sugar): none    Paresthesias (numbness or burning in feet) or sores: yes, no sores    Diabetic eye exam within the last year: UTD    Breakfast eaten regularly: sometimes    Patient counting carbs: Yes    Exercising: no          HPI:  Marly's here today for the follow up regarding her Diabetes mellitus, Type 2.    Lab Results   Component Value Date    A1C >14.0 05/10/2021    A1C 10.7 02/10/2021    A1C 7.6 11/09/2020    A1C 7.9 08/07/2020    A1C 8.1 05/04/2020     Current Diabetes medication:   1. Vgo40 --Novolog, 4 clicks with meals  2. Ozempic 1 mg weekly (Tuesday)  3.  Jardiance 10 mg daily--transitioning to 25 mg daily  Statin use: yes, simvastatin 20 mg daily  ASA: yes, 81 mg daily    Marly's here for a meter download and possible diabetic medication adjustments.   Reports the following:  Forgot her meter again.     Denies any new health concerns.     Denies any issues with her Jardiance.   Not sure what dose she's taking.     KHADIJAH Brunner sets up her medication weekly (Tuesday)    Wt Readings from Last 4 Encounters:   07/14/21 89.6 kg (197 lb 8 oz)   06/23/21 90.2 kg (198 lb 14.4 oz)   06/09/21 91.6 kg (201 lb 14.4 oz)   05/10/21 95.3 kg (210 lb)          Wt Readings from Last 4 Encounters:   07/14/21 89.6 kg (197 lb 8 oz)   06/23/21 90.2 kg (198 lb 14.4 oz)   06/09/21 91.6 kg (201 lb 14.4 oz)   05/10/21 95.3 kg (210 lb)           Patient Active Problem List   Diagnosis     Type 2 diabetes, HbA1c goal < 7% (H)     ACP (advance care planning)     Stage 3 chronic kidney disease     Pulmonary emphysema (H)     Hyperparathyroidism due to renal insufficiency (H)     Morbid obesity (H)     Vitamin D deficiency      Intellectual disability     Breast fibrocystic disorder     Tobacco abuse     Microalbuminuria due to type 2 diabetes mellitus (H)     H/O colonoscopy     [place] cardiac risk <10% in next 10 years     Tubular adenoma of colon     Hyperlipidemia, unspecified hyperlipidemia type     Essential hypertension     Callus of foot     Memory disturbance     ADEEL (obstructive sleep apnea)       History reviewed. No pertinent past medical history.    Past Surgical History:   Procedure Laterality Date     COLONOSCOPY N/A 8/20/2015    Procedure: COLONOSCOPY;  Surgeon: Gentry Porter MD;  Location: [facility]     COLONOSCOPY N/A 9/21/2018    Procedure: COLONOSCOPY;  COLONOSCOPY WITH POLYPECTOMY;  Surgeon: Gentry Porter MD;  Location: [facility]       Family History   Problem Relation Age of Onset     Diabetes Mother      Diabetes Father      Hypertension Brother      Diabetes Sister        Social History     Tobacco Use     Smoking status: Current Every Day Smoker     Packs/day: 1.00     Years: 34.00     Pack years: 34.00     Types: Cigarettes     Start date: 1/1/1979     Smokeless tobacco: Never Used     Tobacco comment: Declined QP 05/13/2020   Substance Use Topics     Alcohol use: No     Alcohol/week: 0.0 standard drinks       Current Outpatient Medications   Medication Sig Dispense Refill     Acetaminophen (TYLENOL PO) Take 325 mg by mouth every 6 hours as needed        amLODIPine (NORVASC) 5 MG tablet Take 1 tablet (5 mg) by mouth daily 90 tablet 3     ammonium lactate (LAC-HYDRIN) 12 % cream Apply topically 2 times daily       aspirin (ASPIRIN LOW DOSE) 81 MG chewable tablet CHEW 1 TABLET BY MOUTH DAILY. DO NOT SPLIT OR CRUSH 90 tablet 3     atorvastatin (LIPITOR) 40 MG tablet Take 1 tablet (40 mg) by mouth At Bedtime 90 tablet 3     blood glucose (ONETOUCH ULTRA) test strip Use to test blood sugar 4 times daily 150 strip 3     budesonide-formoterol (SYMBICORT) 160-4.5 MCG/ACT Inhaler Inhale 2 puffs into the lungs 2  times daily 1 Inhaler 3     Cholecalciferol (VITAMIN D3) 50 MCG (2000 UT) CAPS TAKE 1 CAPSULE BY MOUTH DAILY 90 capsule 3     COMBIVENT RESPIMAT  MCG/ACT inhaler INHALE 1 PUFF INTO THE LUNGS 4 TIMES DAILY 4 g 0      MG capsule TAKE 1 CAPSULE BY MOUTH DAILY 90 capsule 0     empagliflozin (JARDIANCE) 25 MG TABS tablet Take 1 tablet (25 mg) by mouth daily 30 tablet 3     hydrocortisone (CORTAID) 1 % external cream Apply topically 2 times daily 453.6 g 0     hydrocortisone 2.5 % cream Apply topically 2 times daily       insulin aspart (NOVOLOG VIAL) 100 UNITS/ML vial To be used with the V40.  TDD: 76 units 40 mL 3     insulin pen needle (32G X 4 MM) 32G X 4 MM miscellaneous Use 1 pen needles daily 100 each 3     insulin syringes, disposable, (BD INSULIN SYRINGE) U-100 1 ML MISC 1 Device daily 30 each 2     lisinopril (ZESTRIL) 40 MG tablet Take 1 tablet (40 mg) by mouth daily 90 tablet 3     Magnesium Cl-Calcium Carbonate 71.5-119 MG TBEC Take 2 tablets by mouth       nystatin (MYCOSTATIN) 016226 UNIT/GM external powder Apply topically 3 times daily 60 g 3     ONETOUCH DELICA LANCETS 33G MISC 1 each 3 times daily (Patient taking differently: 1 each 4 times daily ) 100 each 10     order for DME Women briefs 50 each 1     polyethylene glycol (MIRALAX) 17 g packet Take 17 g by mouth 3 times daily       primidone (MYSOLINE) 50 MG tablet TAKE 1 TABLET BY MOUTH AT BEDTIME 30 tablet 11     semaglutide (OZEMPIC, 1 MG/DOSE,) 2 MG/1.5ML pen Inject 1 mg Subcutaneous every 7 days 9 mL 1     tiZANidine (ZANAFLEX) 4 MG tablet Take 1 tablet (4 mg) by mouth 3 times daily as needed for muscle spasms 30 tablet 0     triamcinolone (KENALOG) 0.1 % cream Apply topically 2 times daily       wearable insulin delivery device ([employer] 40) kit Insulin delivery device to be changed every 24 hours 30 each 4       No Known Allergies    REVIEW OF SYSTEMS  Skin: negative  Eyes: negative, wears glasses    Ears/Nose/Throat:  "negative  Respiratory: No shortness of breath or hemoptysis; smoker's cough; hx of sleep apnea--not wearing it (can't breath at night)  Cardiovascular: negative  Gastrointestinal: negative  Genitourinary: negative  Musculoskeletal: occasional left shoulder pain and left knee   Neurologic: HA--once in a while  Psychiatric: negative  Hematologic/Lymphatic/Immunologic: negative  Endocrine: positive for diabetes     OBJECTIVE:  /74 (BP Location: Left arm, Cuff Size: Adult Regular)   Pulse 83   Ht 1.6 m (5' 3\")   Wt 89.6 kg (197 lb 8 oz)   SpO2 91%   BMI 34.99 kg/m    Constitutional: healthy, alert and no distress  Musculoskeletal: extremities normal- no gross deformities noted and gait normal  Skin: no suspicious lesions or rashes to visible skin  Psychiatric: mentation appears normal and affect normal/bright    LABS  No results found for any visits on 07/14/21.    ASSESSMENT / PLAN:  (F17.200) Nicotine dependence, uncomplicated, unspecified nicotine product type  (primary encounter diagnosis)  Comment: noted and refused  Plan:   Spoke with patient regarding options for smoking cessation.  Various pharmacologic options and behavioral techniques were reviewed.  The relative effectiveness as well as risks and benefits were reviewed with patient.  Patient is interested in: refused    Smoking Cessation      (E11.65,  Z79.4) Type 2 diabetes mellitus with hyperglycemia, with long-term current use of insulin (H)  Comment: noted A1c 12%  Plan:   Spoke to KHADIJAH Brunner from home care.   She states the following:  Initially, Marly was writing down BGs in a notebook but these didn't match her BGs on her meter    She also reports that Myesha was off for 2 months and when she did look in Marly's fridge (sometimes she refuses), Marly has about 3 months of Ozempic in it.      The last reported BGs are as followed:   168, 175, 189, 191, 160    Myesha will continue looking at her meter.    Make sure she's taking her " medication    Encouraged her to keep changing the Vgo40 daily  Keep doing 4 clicks before meals.     Follow up  2 weeks    Patient Instructions   Continue working on healthy eating and moving (start low and slow, work up to 30 min, 5x/week)    BG goals:  Fasting and before meals <130, >70  2 hour after eating <180    We only need 1/2 of these numbers to be within target then your A1c will be within target    Medication changes   1.  Vgo 40  Meals:  4 clicks     2.  Jardiance   25 mg daily    3. Ozempic  1 mg weekly     Follow up   3 weeks    Call me sooner if any problems/concerns and/or questions develop including consistent low BGs <70 or consistent high BGs >200  [phone] (Unit Coordinator)    [phone] (Anna, Nurse)    Smoking:   Try keep working on reducing the amount of cigarettes           Time: 30 minutes  Barrier: see PMH  Willingness to learn: accepting    Courtney PINTO [facility]  Diabetes and Wound Care      Cc: Amberly Whyte NP    With the electronic record, we can now more quickly and easily track our patient diabetic goals. Our diabetes clinical review is in progress and these are the indicators we are monitoring for good diabetes health.     1.) HbA1C less than 7 (measurement of your average blood sugars)  2.) Blood Pressure less than 140/80  3.) LDL less than 100 (bad cholesterol)  4.) HbA1C is checked in the last 6 months and below 7% (more frequently if not at goal or adjusting medications)  5.) LDL is checked in the last 12 months (more frequently if not at goal or adjusting medications)  6.) Taking one baby aspirin daily (unless otherwise instructed)  7.) No tobacco use  8) Statin use     You have achieved 6 out of 8 of these and I am encouraging you to come in and get tuned up to achieve 8 out of 8!  Here is what you have achieved so far in my goals for you:  1.) HbA1C  less than 7:                              NO  Your last  HbA1C :  Lab Results   Component Value Date    A1C >14.0  05/10/2021    A1C 10.7 02/10/2021    A1C 7.6 11/09/2020    A1C 7.9 08/07/2020    A1C 8.1 05/04/2020     2.) Blood Pressure less than 140/80:       YES      Your last    BP Readings from Last 1 Encounters:   07/14/21 118/74     3.) LDL less than 100:                              YES      Your last     LDL Cholesterol Calculated   Date Value Ref Range Status   11/09/2020 74 <100 mg/dL Final     Comment:     Desirable:       <100 mg/dl       4.) Checked HbA1C in the past 6 months: YES      5.) Checked LDL in the past 12 months:    YES      6.) Taking one aspirin daily:                       YES     7.) No tobacco use:                                        NO   8.) Statin use      YES

## 2021-07-14 NOTE — PATIENT INSTRUCTIONS
Continue working on healthy eating and moving (start low and slow, work up to 30 min, 5x/week)    BG goals:  Fasting and before meals <130, >70  2 hour after eating <180    We only need 1/2 of these numbers to be within target then your A1c will be within target    Medication changes   1.  Vgo 40  Meals:  4 clicks     2.  Jardiance   25 mg daily    3. Ozempic  1 mg weekly     Follow up   3 weeks    Call me sooner if any problems/concerns and/or questions develop including consistent low BGs <70 or consistent high BGs >200  [phone] (Unit Coordinator)    [phone] (Anna, Nurse)    Smoking:   Try keep working on reducing the amount of cigarettes

## 2021-07-30 ENCOUNTER — TELEPHONE (OUTPATIENT)
Dept: FAMILY MEDICINE | Facility: OTHER | Age: 56
End: 2021-07-30

## 2021-07-30 DIAGNOSIS — G25.0 ESSENTIAL TREMOR: ICD-10-CM

## 2021-07-30 RX ORDER — PRIMIDONE 50 MG/1
TABLET ORAL
Qty: 30 TABLET | Refills: 0 | Status: SHIPPED | OUTPATIENT
Start: 2021-07-30 | End: 2021-08-25

## 2021-07-30 NOTE — TELEPHONE ENCOUNTER
Mysoline       Last Written Prescription Date:  8/12/2020  Last Fill Quantity: 30,   # refills: 11  Last Office Visit: 5/10/2021  Future Office visit:    Next 5 appointments (look out 90 days)    Aug 17, 2021  2:30 PM  (Arrive by 2:15 PM)  SHORT with Amberly Whyte NP  [facility] [place] ([facility] [place] ) [address]  [place] [address]  [phone]

## 2021-08-10 DIAGNOSIS — E78.5 HYPERLIPIDEMIA, UNSPECIFIED HYPERLIPIDEMIA TYPE: ICD-10-CM

## 2021-08-10 RX ORDER — ATORVASTATIN CALCIUM 40 MG/1
TABLET, FILM COATED ORAL
Qty: 90 TABLET | Refills: 2 | Status: SHIPPED | OUTPATIENT
Start: 2021-08-10 | End: 2022-05-10

## 2021-08-12 ENCOUNTER — ALLIED HEALTH/NURSE VISIT (OUTPATIENT)
Dept: EDUCATION SERVICES | Facility: OTHER | Age: 56
End: 2021-08-12
Attending: NURSE PRACTITIONER
Payer: COMMERCIAL

## 2021-08-12 VITALS
RESPIRATION RATE: 16 BRPM | BODY MASS INDEX: 34.2 KG/M2 | HEART RATE: 85 BPM | WEIGHT: 193 LBS | SYSTOLIC BLOOD PRESSURE: 134 MMHG | OXYGEN SATURATION: 94 % | HEIGHT: 63 IN | DIASTOLIC BLOOD PRESSURE: 86 MMHG

## 2021-08-12 DIAGNOSIS — E11.9 TYPE 2 DIABETES, HBA1C GOAL < 7% (H): ICD-10-CM

## 2021-08-12 DIAGNOSIS — F17.210 NICOTINE DEPENDENCE, CIGARETTES, UNCOMPLICATED: ICD-10-CM

## 2021-08-12 DIAGNOSIS — E11.65 TYPE 2 DIABETES MELLITUS WITH HYPERGLYCEMIA, WITH LONG-TERM CURRENT USE OF INSULIN (H): Primary | ICD-10-CM

## 2021-08-12 DIAGNOSIS — Z79.4 TYPE 2 DIABETES MELLITUS WITH HYPERGLYCEMIA, WITH LONG-TERM CURRENT USE OF INSULIN (H): Primary | ICD-10-CM

## 2021-08-12 LAB
ANION GAP SERPL CALCULATED.3IONS-SCNC: 3 MMOL/L (ref 3–14)
BUN SERPL-MCNC: 17 MG/DL (ref 7–30)
CALCIUM SERPL-MCNC: 9.7 MG/DL (ref 8.5–10.1)
CHLORIDE BLD-SCNC: 104 MMOL/L (ref 94–109)
CO2 SERPL-SCNC: 31 MMOL/L (ref 20–32)
CREAT SERPL-MCNC: 1.07 MG/DL (ref 0.52–1.04)
EST. AVERAGE GLUCOSE BLD GHB EST-MCNC: 235 MG/DL
GFR SERPL CREATININE-BSD FRML MDRD: 57 ML/MIN/1.73M2
GLUCOSE BLD-MCNC: 173 MG/DL (ref 70–99)
HBA1C MFR BLD: 9.8 % (ref 0–5.6)
POTASSIUM BLD-SCNC: 4.2 MMOL/L (ref 3.4–5.3)
SODIUM SERPL-SCNC: 138 MMOL/L (ref 133–144)

## 2021-08-12 PROCEDURE — G0463 HOSPITAL OUTPT CLINIC VISIT: HCPCS

## 2021-08-12 PROCEDURE — 36415 COLL VENOUS BLD VENIPUNCTURE: CPT | Mod: ZL | Performed by: NURSE PRACTITIONER

## 2021-08-12 PROCEDURE — 83036 HEMOGLOBIN GLYCOSYLATED A1C: CPT | Mod: ZL | Performed by: NURSE PRACTITIONER

## 2021-08-12 PROCEDURE — 99213 OFFICE O/P EST LOW 20 MIN: CPT | Performed by: NURSE PRACTITIONER

## 2021-08-12 PROCEDURE — 80048 BASIC METABOLIC PNL TOTAL CA: CPT | Mod: ZL | Performed by: NURSE PRACTITIONER

## 2021-08-12 ASSESSMENT — MIFFLIN-ST. JEOR: SCORE: 1424.57

## 2021-08-12 ASSESSMENT — PAIN SCALES - GENERAL: PAINLEVEL: NO PAIN (0)

## 2021-08-12 NOTE — PATIENT INSTRUCTIONS
Continue working on healthy eating and moving (start low and slow, work up to 30 min, 5x/week)    BG goals:  Fasting and before meals <130, >70  2 hour after eating <180    We only need 1/2 of these numbers to be within target then your A1c will be within target    Medication changes   1.  Vgo 40  Breakfast/lunch: 3 clicks  Supper: 4 clicks     2.  Jardiance (no change)  25 mg daily    3. Ozempic  1 mg weekly   (might consider Trulicity in the future)      Follow up   1 month    Call me sooner if any problems/concerns and/or questions develop including consistent low BGs <70 or consistent high BGs >200  [phone] (Unit Coordinator)    [phone] (Anna, Nurse)    Smoking:   Try keep working on reducing the amount of cigarettes

## 2021-08-12 NOTE — PROGRESS NOTES
"SUBJECTIVE:  Marly Cannon, 56 year old, female presents with the following Chief Complaint(s) with HPI to follow:  Chief Complaint   Patient presents with     Diabetes        Diabetes Follow-up    Patient is checking blood sugars: 1-2x/day.  Results:  B  Highest: 491  Lowest: 112      Symptoms of hypoglycemia (low blood sugar): felt \"different\" at 112    Paresthesias (numbness or burning in feet) or sores: yes, no sores    Diabetic eye exam within the last year: UTD    Breakfast eaten regularly: sometimes    Patient counting carbs: Yes    Exercising: no          HPI:  Marly's here today for the follow up regarding her Diabetes mellitus, Type 2.    Lab Results   Component Value Date    A1C >14.0 05/10/2021    A1C 10.7 02/10/2021    A1C 7.6 2020    A1C 7.9 2020    A1C 8.1 2020     Current Diabetes medication:   1. Vgo40 --Novolog, 3 clicks with meals  2. Ozempic 1 mg weekly (Tuesday)  3.  Jardiance 25 mg daily  Statin use: yes, simvastatin 20 mg daily  ASA: yes, 81 mg daily    Marly's here for a meter download and possible diabetic medication adjustments.   Reports the following:  No new health concerns.     Denies any issues with her Jardiance.     KHADIJAH Brunner continues to set up her medication weekly (Tuesday)    Wt Readings from Last 4 Encounters:   21 87.5 kg (193 lb)   21 89.6 kg (197 lb 8 oz)   21 90.2 kg (198 lb 14.4 oz)   21 91.6 kg (201 lb 14.4 oz)     Reports she might be getting a new job at an Assisted [facility] in [facility] through [employer]    Continues to drink diet pop.      Patient Active Problem List   Diagnosis     Type 2 diabetes, HbA1c goal < 7% (H)     ACP (advance care planning)     Stage 3 chronic kidney disease     Pulmonary emphysema (H)     Hyperparathyroidism due to renal insufficiency (H)     Morbid obesity (H)     Vitamin D deficiency     Intellectual disability     Breast fibrocystic disorder     Tobacco abuse     Microalbuminuria due to type 2 " diabetes mellitus (H)     H/O colonoscopy     [place] cardiac risk <10% in next 10 years     Tubular adenoma of colon     Hyperlipidemia, unspecified hyperlipidemia type     Essential hypertension     Callus of foot     Memory disturbance     ADEEL (obstructive sleep apnea)       History reviewed. No pertinent past medical history.    Past Surgical History:   Procedure Laterality Date     COLONOSCOPY N/A 8/20/2015    Procedure: COLONOSCOPY;  Surgeon: Gentry Porter MD;  Location: [facility]     COLONOSCOPY N/A 9/21/2018    Procedure: COLONOSCOPY;  COLONOSCOPY WITH POLYPECTOMY;  Surgeon: Gentry Porter MD;  Location: [facility]       Family History   Problem Relation Age of Onset     Diabetes Mother      Diabetes Father      Hypertension Brother      Diabetes Sister        Social History     Tobacco Use     Smoking status: Current Every Day Smoker     Packs/day: 1.00     Years: 34.00     Pack years: 34.00     Types: Cigarettes     Start date: 1/1/1979     Smokeless tobacco: Never Used     Tobacco comment: Declined QP 05/13/2020   Substance Use Topics     Alcohol use: No     Alcohol/week: 0.0 standard drinks       Current Outpatient Medications   Medication Sig Dispense Refill     Acetaminophen (TYLENOL PO) Take 325 mg by mouth every 6 hours as needed        amLODIPine (NORVASC) 5 MG tablet Take 1 tablet (5 mg) by mouth daily 90 tablet 3     ammonium lactate (LAC-HYDRIN) 12 % cream Apply topically 2 times daily       aspirin (ASPIRIN LOW DOSE) 81 MG chewable tablet CHEW 1 TABLET BY MOUTH DAILY. DO NOT SPLIT OR CRUSH 90 tablet 3     atorvastatin (LIPITOR) 40 MG tablet TAKE 1 TABLET BY MOUTH AT BEDTIME 90 tablet 2     blood glucose (ONETOUCH ULTRA) test strip Use to test blood sugar 4 times daily 150 strip 3     budesonide-formoterol (SYMBICORT) 160-4.5 MCG/ACT Inhaler Inhale 2 puffs into the lungs 2 times daily 1 Inhaler 3     Cholecalciferol (VITAMIN D3) 50 MCG (2000 UT) CAPS TAKE 1 CAPSULE BY MOUTH DAILY 90  capsule 3     COMBIVENT RESPIMAT  MCG/ACT inhaler INHALE 1 PUFF INTO THE LUNGS 4 TIMES DAILY 4 g 0      MG capsule TAKE 1 CAPSULE BY MOUTH DAILY 90 capsule 0     empagliflozin (JARDIANCE) 25 MG TABS tablet Take 1 tablet (25 mg) by mouth daily 30 tablet 3     hydrocortisone (CORTAID) 1 % external cream Apply topically 2 times daily 453.6 g 0     hydrocortisone 2.5 % cream Apply topically 2 times daily       insulin aspart (NOVOLOG VIAL) 100 UNITS/ML vial To be used with the V40.  TDD: 76 units 40 mL 3     insulin pen needle (32G X 4 MM) 32G X 4 MM miscellaneous Use 1 pen needles daily 100 each 3     insulin syringes, disposable, (BD INSULIN SYRINGE) U-100 1 ML MISC 1 Device daily 30 each 2     lisinopril (ZESTRIL) 40 MG tablet Take 1 tablet (40 mg) by mouth daily 90 tablet 3     Magnesium Cl-Calcium Carbonate 71.5-119 MG TBEC Take 2 tablets by mouth       nystatin (MYCOSTATIN) 462173 UNIT/GM external powder Apply topically 3 times daily 60 g 3     ONETOUCH DELICA LANCETS 33G MISC 1 each 3 times daily (Patient taking differently: 1 each 4 times daily ) 100 each 10     order for DME Women briefs 50 each 1     polyethylene glycol (MIRALAX) 17 g packet Take 17 g by mouth 3 times daily       primidone (MYSOLINE) 50 MG tablet TAKE 1 TABLET BY MOUTH AT BEDTIME 30 tablet 0     semaglutide (OZEMPIC, 1 MG/DOSE,) 2 MG/1.5ML pen Inject 1 mg Subcutaneous every 7 days 9 mL 1     tiZANidine (ZANAFLEX) 4 MG tablet Take 1 tablet (4 mg) by mouth 3 times daily as needed for muscle spasms 30 tablet 0     triamcinolone (KENALOG) 0.1 % cream Apply topically 2 times daily       wearable insulin delivery device ([employer] 40) kit Insulin delivery device to be changed every 24 hours 30 each 4       No Known Allergies    REVIEW OF SYSTEMS  Skin: negative  Eyes: negative, wears glasses    Ears/Nose/Throat: negative  Respiratory: No shortness of breath or hemoptysis; smoker's cough; hx of sleep apnea--not wearing it (can't breath at  "night)  Cardiovascular: negative  Gastrointestinal: negative  Genitourinary: negative  Musculoskeletal: negative; hx of left shoulder pain and left knee   Neurologic: HA--once in a while  Psychiatric: negative  Hematologic/Lymphatic/Immunologic: negative  Endocrine: positive for diabetes     OBJECTIVE:  /86   Pulse 85   Resp 16   Ht 1.6 m (5' 3\")   Wt 87.5 kg (193 lb)   SpO2 94%   BMI 34.19 kg/m    Constitutional: healthy, alert and no distress  Musculoskeletal: extremities normal- no gross deformities noted and gait normal  Skin: no suspicious lesions or rashes to visible skin  Psychiatric: mentation appears normal and affect normal/bright    LABS  Results for orders placed or performed in visit on 08/12/21   Hemoglobin A1c     Status: Abnormal   Result Value Ref Range    Estimated Average Glucose 235 mg/dL    Hemoglobin A1C 9.8 (H) 0.0 - 5.6 %   Basic metabolic panel     Status: Abnormal   Result Value Ref Range    Sodium 138 133 - 144 mmol/L    Potassium 4.2 3.4 - 5.3 mmol/L    Chloride 104 94 - 109 mmol/L    Carbon Dioxide (CO2) 31 20 - 32 mmol/L    Anion Gap 3 3 - 14 mmol/L    Urea Nitrogen 17 7 - 30 mg/dL    Creatinine 1.07 (H) 0.52 - 1.04 mg/dL    Calcium 9.7 8.5 - 10.1 mg/dL    Glucose 173 (H) 70 - 99 mg/dL    GFR Estimate 57 (L) >60 mL/min/1.73m2       ASSESSMENT / PLAN:  (E11.65,  Z79.4) Type 2 diabetes mellitus with hyperglycemia, with long-term current use of insulin (H)  (primary encounter diagnosis)  Comment: noted A1c; still spiking after supper (>400)  Plan:   Trending in the right direction    Keep working on giving   3 clicks before breakfast  4 clicks with supper     Might need to consider going back to [facility] and adjusting her to 3 mg weekly and maybe 4.5 mg weekly    (E11.9) Type 2 diabetes, HbA1c goal < 7% (H)  Comment: noted  Plan:   As stated above    (F17.210) Nicotine dependence, cigarettes, uncomplicated   Comment: noted and refused  Plan:   Spoke with patient regarding " options for smoking cessation.  Various pharmacologic options and behavioral techniques were reviewed.  The relative effectiveness as well as risks and benefits were reviewed with patient.  Patient is interested in: refused    Smoking Cessation    Marly's aware to let us know when she's ready to quit smoking.  Discussed the dangers of smoking with diabetes    Follow up  4 weeks    Patient Instructions   Continue working on healthy eating and moving (start low and slow, work up to 30 min, 5x/week)    BG goals:  Fasting and before meals <130, >70  2 hour after eating <180    We only need 1/2 of these numbers to be within target then your A1c will be within target    Medication changes   1.  Vgo 40  Breakfast/lunch: 3 clicks  Supper: 4 clicks     2.  Jardiance (no change)  25 mg daily    3. Ozempic  1 mg weekly   (might consider Trulicity in the future)      Follow up   1 month    Call me sooner if any problems/concerns and/or questions develop including consistent low BGs <70 or consistent high BGs >200  [phone] (Unit Coordinator)    [phone] (Anna, Nurse)    Smoking:   Try keep working on reducing the amount of cigarettes           Time: 30 minutes  Barrier: see PMH  Willingness to learn: accepting    Courtney PINTO [facility]  Diabetes and Wound Care      Cc: Amberly Whyte NP    With the electronic record, we can now more quickly and easily track our patient diabetic goals. Our diabetes clinical review is in progress and these are the indicators we are monitoring for good diabetes health.     1.) HbA1C less than 7 (measurement of your average blood sugars)  2.) Blood Pressure less than 140/80  3.) LDL less than 100 (bad cholesterol)  4.) HbA1C is checked in the last 6 months and below 7% (more frequently if not at goal or adjusting medications)  5.) LDL is checked in the last 12 months (more frequently if not at goal or adjusting medications)  6.) Taking one baby aspirin daily (unless otherwise  instructed)  7.) No tobacco use  8) Statin use     You have achieved 6 out of 8 of these and I am encouraging you to come in and get tuned up to achieve 8 out of 8!  Here is what you have achieved so far in my goals for you:  1.) HbA1C  less than 7:                              NO  Your last  HbA1C :  Lab Results   Component Value Date    A1C 9.8 08/12/2021    A1C >14.0 05/10/2021    A1C 10.7 02/10/2021    A1C 7.6 11/09/2020    A1C 7.9 08/07/2020    A1C 8.1 05/04/2020     2.) Blood Pressure less than 140/80:       YES      Your last    BP Readings from Last 1 Encounters:   08/12/21 134/86     3.) LDL less than 100:                              YES      Your last     LDL Cholesterol Calculated   Date Value Ref Range Status   11/09/2020 74 <100 mg/dL Final     Comment:     Desirable:       <100 mg/dl       4.) Checked HbA1C in the past 6 months: YES      5.) Checked LDL in the past 12 months:    YES      6.) Taking one aspirin daily:                       YES     7.) No tobacco use:                                        NO   8.) Statin use      YES

## 2021-08-16 NOTE — PROGRESS NOTES
"    Assessment & Plan     Hyperlipidemia, unspecified hyperlipidemia type  Tolerating statin. Will continue. Will recheck lipids and CMP in 3 months.     - Comprehensive metabolic panel (BMP + Alb, Alk Phos, ALT, AST, Total. Bili, TP); Future  - Lipid Profile (Chol, Trig, HDL, LDL calc); Future    Pulmonary emphysema, unspecified emphysema type (H)  Stable. Continue current inhalers. F/up 3 months. Sooner with new or worsening symptoms. Smoking cessation encouraged.     Type 2 diabetes, HbA1c goal < 7% (H)  A1C recently 9.8 which is improved frm 14. Continue f/up with the DM center. Seems to be doing better with homecare has started giving her the Ozempic.     - Hemoglobin A1c; Future  - Comprehensive metabolic panel (BMP + Alb, Alk Phos, ALT, AST, Total. Bili, TP); Future  - Albumin Random Urine Quantitative with Creat Ratio; Future    Microalbuminuria due to type 2 diabetes mellitus (H)  On ACE. Will continue.     Essential hypertension  Well controlled. Continue current medications. Encouraged daily exercise and a low sodium diet. Recommended checking BP's 2x/wk, call the clinic if consistantly s>140 or d>90. Follow up in 3 months.     Morbid obesity (H)  BMI 33.66. She is losing weight. Weight down 22 pounds since 4/2021, but she has given up soda and has been taking her Ozempic weekly. She is happy with the results. Recheck weight in 12 weeks.     Tobacco abuse  Smoking 1-2 ppd. No interest in quitting.     Tear of left supraspinatus tendon  Pain has slightly improved, but she has never seen ortho. Referral placed to OA.     - Orthopedic  Referral; Future    Stage 3 chronic kidney disease, unspecified whether stage 3a or 3b CKD  GFR 57 in 8/2021. Stable. Will keep tight BP control. Will avoid NSAIDs and get better control of her DM. F/up 3 months for a recheck.            BMI:   Estimated body mass index is 33.66 kg/m  as calculated from the following:    Height as of 8/12/21: 1.6 m (5' 3\").    Weight " as of this encounter: 86.2 kg (190 lb).   Weight management plan: Discussed healthy diet and exercise guidelines      Return in about 3 months (around 11/17/2021).    Amberly Whyte NP  [facility] [place] Luke is a 56 year old who presents for the following health issues    HPI     Diabetes Follow-up    How often are you checking your blood sugar? Three times daily  Blood sugar testing frequency justification:  Adjustment of medication(s)  What time of day are you checking your blood sugars (select all that apply)?  Before meals and At bedtime  Have you had any blood sugars above 200?  No  Have you had any blood sugars below 70?  No    What symptoms do you notice when your blood sugar is low?  Shaky, Dizzy and Confusion    What concerns do you have today about your diabetes? None     Do you have any of these symptoms? (Select all that apply)  Weight loss , since using the Ozempic religiously     A1C was 9.8 on 8/12/21. Following with the DM Center. Unclear if she is using her V-Go appropriately. Also using Ozempic once weekly. She has trouble giving herself the medication, but the home care nurse helps her.     On asa.     She denies chest pain, shortness of breath, dizziness, syncope, or palpitations.   -Microalbuminuria present. On ACE.   -She does have chronic kidney disease. Typically avoids NSAIDs.       Hyperlipidemia Follow-Up      Are you regularly taking any medication or supplement to lower your cholesterol?   Yes- atorvastatin    Are you having muscle aches or other side effects that you think could be caused by your cholesterol lowering medication?  No     As noted above, she denies chest pain, shortness of breath, dizziness, syncope, or palpitations.     Hypertension Follow-up      Do you check your blood pressure regularly outside of the clinic? Yes     Are you following a low salt diet? No    Are your blood pressures ever more than 140 on the top number (systolic)  OR more   than 90 on the bottom number (diastolic), for example 140/90? Yes   -Taking amlodipine and lisinopril without side effects.    -As noted above, she denies chest pain, shortness of breath, dizziness, syncope, or palpitations  -She does smoke, no interest in quitting.       BP Readings from Last 2 Encounters:   08/17/21 122/80   08/12/21 134/86     Hemoglobin A1C (%)   Date Value   08/12/2021 9.8 (H)   05/10/2021 >14.0 (H)   02/10/2021 10.7 (H)     LDL Cholesterol Calculated (mg/dL)   Date Value   11/09/2020 74   02/04/2020 49       COPD Follow-Up    Overall, how are your COPD symptoms since your last clinic visit?  No change    How much fatigue or shortness of breath do you have when you are walking?  Same as usual    How much shortness of breath do you have when you are resting?  Same as usual    How often do you cough? Sometimes    Have you noticed any change in your sputum/phlegm?  No    Have you experienced a recent fever? No    Please describe how far you can walk without stopping to rest:  Less than a mile    How many flights of stairs are you able to walk up without stopping?  None    Have you had any Emergency Room Visits, Urgent Care Visits, or Hospital Admissions because of your COPD since your last office visit?  No     Has Combivent and Symbicort ordered. She notes that she has been using these. Shortness of breath controlled when she uses the inhalers. Continues to smoke, smoking 1-2 ppd. No interest in quitting. No fevers. No blood in sputum.     Some weight loss. Down 22 pounds since April. Is trying to lose weight. No fevers. No night sweats. No abdominal pain.  Up to date with colonoscopy and mammogram. Due for repeat colonoscopy in 2023. Was drinking 3-4 cans of regular coke daily, now drinking one can of diet soda daily. Also limiting sugar. On Ozempic.     Due for the Covid, Hep B, and Tdap vaccines. Declines.     Also continues to complain of left shoulder pain. MRI was done in 5/2021.  Partial tearing was seen of her supraspinatus tendon. Had been referred to ortho, but she never went. Wanting to now see them. No joint erythema or swelling.     History   Smoking Status     Current Every Day Smoker     Packs/day: 1.00     Years: 34.00     Types: Cigarettes     Start date: 1/1/1979   Smokeless Tobacco     Never Used     Comment: Declined QP 05/13/2020     No results found for: FEV1, UHM3VQM      How many servings of fruits and vegetables do you eat daily?  0-1    On average, how many sweetened beverages do you drink each day (Examples: soda, juice, sweet tea, etc.  Do NOT count diet or artificially sweetened beverages)?   0    How many days per week do you exercise enough to make your heart beat faster? 3 or less    How many minutes a day do you exercise enough to make your heart beat faster? 9 or less    How many days per week do you miss taking your medication? 0      Review of Systems   As noted in the HPI.       Objective    /80 (BP Location: Left arm, Patient Position: Chair, Cuff Size: Adult Large)   Pulse 88   Temp 98  F (36.7  C) (Tympanic)   Wt 86.2 kg (190 lb)   SpO2 95%   BMI 33.66 kg/m    Body mass index is 33.66 kg/m .  Physical Exam   Physical Exam   GENERAL: healthy, alert, no distress and over weight  EYES: Eyes grossly normal to inspection, PERRL and conjunctivae and sclerae normal  HENT: ear canals and TM's normal, nose and mouth without ulcers or lesions  NECK: no adenopathy, no asymmetry, masses, or scars and thyroid normal to palpation  RESP: lungs clear to auscultation - no rales, rhonchi or wheezes  CV: regular rate and rhythm, normal S1 S2, no S3 or S4, no murmur, click or rub, no peripheral edema and peripheral pulses present  NEURO: Normal strength and tone, mentation intact and speech normal  PSYCH: mentation appears normal, affect normal/bright    LEFT SHOULDER: Skin intact. No erythema, edema, or ecchymosis. No pain with palpation over anterior joint. No pain  over bursa. No pain over AC joint. Some mild pain with palpation over upper trapezius muscle. Full ROM currently. Radial pulse 2+.     Diabetic foot exam: normal sensory exam, dry cracking feet-follows with podiatry, pulses are present, but diminished

## 2021-08-17 ENCOUNTER — APPOINTMENT (OUTPATIENT)
Dept: LAB | Facility: OTHER | Age: 56
End: 2021-08-17
Attending: NURSE PRACTITIONER
Payer: COMMERCIAL

## 2021-08-17 ENCOUNTER — OFFICE VISIT (OUTPATIENT)
Dept: FAMILY MEDICINE | Facility: OTHER | Age: 56
End: 2021-08-17
Attending: NURSE PRACTITIONER
Payer: COMMERCIAL

## 2021-08-17 VITALS
WEIGHT: 190 LBS | SYSTOLIC BLOOD PRESSURE: 122 MMHG | BODY MASS INDEX: 33.66 KG/M2 | OXYGEN SATURATION: 95 % | TEMPERATURE: 98 F | DIASTOLIC BLOOD PRESSURE: 80 MMHG | HEART RATE: 88 BPM

## 2021-08-17 DIAGNOSIS — E11.29 MICROALBUMINURIA DUE TO TYPE 2 DIABETES MELLITUS (H): ICD-10-CM

## 2021-08-17 DIAGNOSIS — R80.9 MICROALBUMINURIA DUE TO TYPE 2 DIABETES MELLITUS (H): ICD-10-CM

## 2021-08-17 DIAGNOSIS — E11.9 TYPE 2 DIABETES, HBA1C GOAL < 7% (H): ICD-10-CM

## 2021-08-17 DIAGNOSIS — N18.30 STAGE 3 CHRONIC KIDNEY DISEASE, UNSPECIFIED WHETHER STAGE 3A OR 3B CKD (H): ICD-10-CM

## 2021-08-17 DIAGNOSIS — E66.01 MORBID OBESITY (H): ICD-10-CM

## 2021-08-17 DIAGNOSIS — J43.9 PULMONARY EMPHYSEMA, UNSPECIFIED EMPHYSEMA TYPE (H): ICD-10-CM

## 2021-08-17 DIAGNOSIS — Z72.0 TOBACCO ABUSE: ICD-10-CM

## 2021-08-17 DIAGNOSIS — E78.5 HYPERLIPIDEMIA, UNSPECIFIED HYPERLIPIDEMIA TYPE: Primary | ICD-10-CM

## 2021-08-17 DIAGNOSIS — M75.102 TEAR OF LEFT SUPRASPINATUS TENDON: ICD-10-CM

## 2021-08-17 DIAGNOSIS — I10 ESSENTIAL HYPERTENSION: ICD-10-CM

## 2021-08-17 PROCEDURE — 99214 OFFICE O/P EST MOD 30 MIN: CPT | Performed by: NURSE PRACTITIONER

## 2021-08-17 PROCEDURE — G0463 HOSPITAL OUTPT CLINIC VISIT: HCPCS

## 2021-08-17 ASSESSMENT — PAIN SCALES - GENERAL: PAINLEVEL: NO PAIN (0)

## 2021-08-17 NOTE — NURSING NOTE
"Chief Complaint   Patient presents with     Diabetes     Lipids     Hypertension     COPD       Initial /80 (BP Location: Left arm, Patient Position: Chair, Cuff Size: Adult Large)   Pulse 88   Temp 98  F (36.7  C) (Tympanic)   Wt 86.2 kg (190 lb)   SpO2 95%   BMI 33.66 kg/m   Estimated body mass index is 33.66 kg/m  as calculated from the following:    Height as of 8/12/21: 1.6 m (5' 3\").    Weight as of this encounter: 86.2 kg (190 lb).  Medication Reconciliation: complete  Luis Leon LPN  "

## 2021-08-24 DIAGNOSIS — G25.0 ESSENTIAL TREMOR: ICD-10-CM

## 2021-08-25 RX ORDER — PRIMIDONE 50 MG/1
TABLET ORAL
Qty: 30 TABLET | Refills: 4 | Status: SHIPPED | OUTPATIENT
Start: 2021-08-25 | End: 2022-02-07

## 2021-08-25 NOTE — TELEPHONE ENCOUNTER
primidone (MYSOLINE) 50 MG tablet  Last Written Prescription Date:  7/30/21  Last Fill Quantity: 30,  # refills: 0   Last office visit: 8/17/2021   Future Office Visit:   Next 5 appointments (look out 90 days)    Nov 17, 2021  2:10 PM  (Arrive by 1:55 PM)  Office Visit with Amberly Whyte NP  [facility] [place] ([facility] [place] ) [address]  [place] [address]  [phone]           Routing refill request to provider for review/approval because:  Drug not on the [facility] refill protocol

## 2021-08-26 ENCOUNTER — TRANSFERRED RECORDS (OUTPATIENT)
Dept: HEALTH INFORMATION MANAGEMENT | Facility: CLINIC | Age: 56
End: 2021-08-26

## 2021-08-31 DIAGNOSIS — I10 ESSENTIAL HYPERTENSION: ICD-10-CM

## 2021-08-31 NOTE — TELEPHONE ENCOUNTER
amLODIPine (NORVASC) 5 MG tablet      Last Written Prescription Date:  8/7/20  Last Fill Quantity: 90,   # refills: 3  Last Office Visit: 8/17/21  Future Office visit:    Next 5 appointments (look out 90 days)    Nov 17, 2021  2:10 PM  (Arrive by 1:55 PM)  Office Visit with Amberly Whyte NP  [facility] ([facility] ) [address]  [place] [address]  [phone]           Routing refill request to provider for review/approval because:    Calcium Channel Blockers Protocol Failed    Creatinine   Date Value Ref Range Status   08/12/2021 1.07 (H) 0.52 - 1.04 mg/dL Final   06/23/2021 1.07 (H) 0.52 - 1.04 mg/dL Final

## 2021-09-01 RX ORDER — AMLODIPINE BESYLATE 5 MG/1
TABLET ORAL
Qty: 90 TABLET | Refills: 0 | Status: SHIPPED | OUTPATIENT
Start: 2021-09-01 | End: 2021-12-02

## 2021-09-07 DIAGNOSIS — I10 ESSENTIAL HYPERTENSION: ICD-10-CM

## 2021-09-08 RX ORDER — ASPIRIN 81 MG/1
TABLET, CHEWABLE ORAL
Qty: 90 TABLET | Refills: 0 | Status: SHIPPED | OUTPATIENT
Start: 2021-09-08 | End: 2021-12-02

## 2021-09-08 NOTE — TELEPHONE ENCOUNTER
asa      Last Written Prescription Date:  8/7/2020  Last Fill Quantity: 90,   # refills: 3  Last Office Visit: 8/17/21  Future Office visit:    Next 5 appointments (look out 90 days)    Nov 17, 2021  2:10 PM  (Arrive by 1:55 PM)  Office Visit with Amberly Whyte NP  [facility] [place] ([facility] [place] ) [address]  [place] [address]  [phone]

## 2021-09-09 ENCOUNTER — ALLIED HEALTH/NURSE VISIT (OUTPATIENT)
Dept: EDUCATION SERVICES | Facility: OTHER | Age: 56
End: 2021-09-09
Attending: NURSE PRACTITIONER
Payer: COMMERCIAL

## 2021-09-09 VITALS
BODY MASS INDEX: 33.54 KG/M2 | SYSTOLIC BLOOD PRESSURE: 112 MMHG | WEIGHT: 189.3 LBS | HEART RATE: 91 BPM | DIASTOLIC BLOOD PRESSURE: 80 MMHG | OXYGEN SATURATION: 94 % | HEIGHT: 63 IN | RESPIRATION RATE: 16 BRPM

## 2021-09-09 DIAGNOSIS — F17.200 NICOTINE DEPENDENCE, UNCOMPLICATED, UNSPECIFIED NICOTINE PRODUCT TYPE: Primary | ICD-10-CM

## 2021-09-09 DIAGNOSIS — E11.9 TYPE 2 DIABETES, HBA1C GOAL < 7% (H): ICD-10-CM

## 2021-09-09 DIAGNOSIS — Z79.4 TYPE 2 DIABETES MELLITUS WITH HYPERGLYCEMIA, WITH LONG-TERM CURRENT USE OF INSULIN (H): ICD-10-CM

## 2021-09-09 DIAGNOSIS — E11.65 TYPE 2 DIABETES MELLITUS WITH HYPERGLYCEMIA, WITH LONG-TERM CURRENT USE OF INSULIN (H): ICD-10-CM

## 2021-09-09 PROCEDURE — G0463 HOSPITAL OUTPT CLINIC VISIT: HCPCS

## 2021-09-09 PROCEDURE — 99214 OFFICE O/P EST MOD 30 MIN: CPT | Performed by: NURSE PRACTITIONER

## 2021-09-09 RX ORDER — SEMAGLUTIDE 1.34 MG/ML
1 INJECTION, SOLUTION SUBCUTANEOUS
Qty: 9 ML | Refills: 1 | Status: CANCELLED | OUTPATIENT
Start: 2021-09-09

## 2021-09-09 RX ORDER — SYRINGE, DISPOSABLE, 1 ML
1 SYRINGE, EMPTY DISPOSABLE MISCELLANEOUS DAILY
Qty: 30 EACH | Refills: 2 | Status: SHIPPED | OUTPATIENT
Start: 2021-09-09 | End: 2021-09-20 | Stop reason: ALTCHOICE

## 2021-09-09 RX ORDER — DULAGLUTIDE 1.5 MG/.5ML
1.5 INJECTION, SOLUTION SUBCUTANEOUS
Qty: 2 ML | Refills: 0 | Status: SHIPPED | OUTPATIENT
Start: 2021-09-09 | End: 2021-10-14 | Stop reason: DRUGHIGH

## 2021-09-09 ASSESSMENT — ANXIETY QUESTIONNAIRES
7. FEELING AFRAID AS IF SOMETHING AWFUL MIGHT HAPPEN: NOT AT ALL
5. BEING SO RESTLESS THAT IT IS HARD TO SIT STILL: NEARLY EVERY DAY
1. FEELING NERVOUS, ANXIOUS, OR ON EDGE: NOT AT ALL
6. BECOMING EASILY ANNOYED OR IRRITABLE: NOT AT ALL
IF YOU CHECKED OFF ANY PROBLEMS ON THIS QUESTIONNAIRE, HOW DIFFICULT HAVE THESE PROBLEMS MADE IT FOR YOU TO DO YOUR WORK, TAKE CARE OF THINGS AT HOME, OR GET ALONG WITH OTHER PEOPLE: SOMEWHAT DIFFICULT
GAD7 TOTAL SCORE: 3
4. TROUBLE RELAXING: NOT AT ALL
3. WORRYING TOO MUCH ABOUT DIFFERENT THINGS: NOT AT ALL
2. NOT BEING ABLE TO STOP OR CONTROL WORRYING: NOT AT ALL

## 2021-09-09 ASSESSMENT — PAIN SCALES - GENERAL: PAINLEVEL: NO PAIN (0)

## 2021-09-09 ASSESSMENT — MIFFLIN-ST. JEOR: SCORE: 1407.79

## 2021-09-09 ASSESSMENT — PATIENT HEALTH QUESTIONNAIRE - PHQ9: SUM OF ALL RESPONSES TO PHQ QUESTIONS 1-9: 5

## 2021-09-09 NOTE — PROGRESS NOTES
"Chief Complaint   Patient presents with     Diabetes       Initial /80   Pulse 91   Resp 16   Ht 1.6 m (5' 3\")   Wt 85.9 kg (189 lb 4.8 oz)   SpO2 94%   BMI 33.53 kg/m   Estimated body mass index is 33.53 kg/m  as calculated from the following:    Height as of this encounter: 1.6 m (5' 3\").    Weight as of this encounter: 85.9 kg (189 lb 4.8 oz).  Medication Reconciliation: complete  Gege Walter LPN    "

## 2021-09-09 NOTE — PROGRESS NOTES
"SUBJECTIVE:  Marly Cannon, 56 year old, female presents with the following Chief Complaint(s) with HPI to follow:  Chief Complaint   Patient presents with     Diabetes        Diabetes Follow-up    Patient is checking blood sugars: 1-2x/day.  Results:  B  Highest: 246  Lowest: 102      Symptoms of hypoglycemia (low blood sugar): no    Paresthesias (numbness or burning in feet) or sores: yes, no sores    Diabetic eye exam within the last year: UTD    Breakfast eaten regularly: yah--breakfast bowl or rollup    Patient counting carbs: Yes    Exercising: \"good\"--walks when it's not raining          HPI:  Marly's here today for the follow up regarding her Diabetes mellitus, Type 2.    Lab Results   Component Value Date    A1C 9.8 2021    A1C >14.0 05/10/2021    A1C 10.7 02/10/2021    A1C 7.6 2020    A1C 7.9 2020    A1C 8.1 2020     Current Diabetes medication:   1. Vgo40 --Novolog, 3 clicks with meals  2. Ozempic 1 mg weekly (Tuesday)  3.  Jardiance 25 mg daily  Statin use: yes, simvastatin 20 mg daily  ASA: yes, 81 mg daily    Marly's here for a meter download and possible diabetic medication adjustments.   Reports the following:  No new health concerns.     KHADIJAH Brunner continues to set up her medication weekly (Monday or Tuesday)    Weight trend   Wt Readings from Last 4 Encounters:   21 85.9 kg (189 lb 4.8 oz)   21 86.2 kg (190 lb)   21 87.5 kg (193 lb)   21 89.6 kg (197 lb 8 oz)     Decision to start Trulicity     Patient Active Problem List   Diagnosis     Type 2 diabetes, HbA1c goal < 7% (H)     ACP (advance care planning)     Stage 3 chronic kidney disease     Pulmonary emphysema (H)     Hyperparathyroidism due to renal insufficiency (H)     Morbid obesity (H)     Vitamin D deficiency     Intellectual disability     Breast fibrocystic disorder     Tobacco abuse     Microalbuminuria due to type 2 diabetes mellitus (H)     H/O colonoscopy     [place] " cardiac risk <10% in next 10 years     Tubular adenoma of colon     Hyperlipidemia, unspecified hyperlipidemia type     Essential hypertension     Callus of foot     Memory disturbance     ADEEL (obstructive sleep apnea)       History reviewed. No pertinent past medical history.    Past Surgical History:   Procedure Laterality Date     COLONOSCOPY N/A 8/20/2015    Procedure: COLONOSCOPY;  Surgeon: Gentry Porter MD;  Location: [facility]     COLONOSCOPY N/A 9/21/2018    Procedure: COLONOSCOPY;  COLONOSCOPY WITH POLYPECTOMY;  Surgeon: Gentry Porter MD;  Location: [facility] OR       Family History   Problem Relation Age of Onset     Diabetes Mother      Diabetes Father      Hypertension Brother      Diabetes Sister        Social History     Tobacco Use     Smoking status: Current Every Day Smoker     Packs/day: 1.00     Years: 34.00     Pack years: 34.00     Types: Cigarettes     Start date: 1/1/1979     Smokeless tobacco: Never Used     Tobacco comment: Declined QP 05/13/2020   Substance Use Topics     Alcohol use: No     Alcohol/week: 0.0 standard drinks       Current Outpatient Medications   Medication Sig Dispense Refill     Acetaminophen (TYLENOL PO) Take 325 mg by mouth every 6 hours as needed        amLODIPine (NORVASC) 5 MG tablet TAKE 1 TABLET BY MOUTH DAILY 90 tablet 0     ammonium lactate (LAC-HYDRIN) 12 % cream Apply topically 2 times daily       aspirin (ASPIRIN LOW DOSE) 81 MG chewable tablet CHEW 1 TABLET BY MOUTH DAILY. DO NOT SPLIT OR CRUSH 90 tablet 0     atorvastatin (LIPITOR) 40 MG tablet TAKE 1 TABLET BY MOUTH AT BEDTIME 90 tablet 2     blood glucose (ONETOUCH ULTRA) test strip Use to test blood sugar 4 times daily 150 strip 3     budesonide-formoterol (SYMBICORT) 160-4.5 MCG/ACT Inhaler Inhale 2 puffs into the lungs 2 times daily 1 Inhaler 3     Cholecalciferol (VITAMIN D3) 50 MCG (2000 UT) CAPS TAKE 1 CAPSULE BY MOUTH DAILY 90 capsule 3      MG capsule TAKE 1 CAPSULE BY MOUTH DAILY 90  capsule 0     dulaglutide (TRULICITY) 1.5 MG/0.5ML pen Inject 1.5 mg Subcutaneous every 7 days 2 mL 0     empagliflozin (JARDIANCE) 25 MG TABS tablet Take 1 tablet (25 mg) by mouth daily 30 tablet 3     hydrocortisone (CORTAID) 1 % external cream Apply topically 2 times daily 453.6 g 0     hydrocortisone 2.5 % cream Apply topically 2 times daily       insulin aspart (NOVOLOG VIAL) 100 UNITS/ML vial To be used with the V40.  TDD: 76 units 40 mL 3     insulin pen needle (32G X 4 MM) 32G X 4 MM miscellaneous Use 1 pen needles daily 100 each 3     lisinopril (ZESTRIL) 40 MG tablet Take 1 tablet (40 mg) by mouth daily 90 tablet 3     Magnesium Cl-Calcium Carbonate 71.5-119 MG TBEC Take 2 tablets by mouth       nystatin (MYCOSTATIN) 008666 UNIT/GM external powder Apply topically 3 times daily 60 g 3     ONETOUCH DELICA LANCETS 33G MISC 1 each 3 times daily (Patient taking differently: 1 each 4 times daily ) 100 each 10     order for DME Women briefs 50 each 1     polyethylene glycol (MIRALAX) 17 g packet Take 17 g by mouth 3 times daily       primidone (MYSOLINE) 50 MG tablet TAKE 1 TABLET BY MOUTH AT BEDTIME 30 tablet 4     semaglutide (OZEMPIC, 1 MG/DOSE,) 2 MG/1.5ML pen Inject 1 mg Subcutaneous every 7 days 9 mL 1     tiZANidine (ZANAFLEX) 4 MG tablet Take 1 tablet (4 mg) by mouth 3 times daily as needed for muscle spasms 30 tablet 0     triamcinolone (KENALOG) 0.1 % cream Apply topically 2 times daily       COMBIVENT RESPIMAT  MCG/ACT inhaler INHALE 1 PUFF INTO THE LUNGS 4 TIMES DAILY 4 g 0     wearable insulin delivery device (V-GO 40) kit Insulin delivery device to be changed every 24 hours 30 each 4       No Known Allergies    REVIEW OF SYSTEMS  Skin: negative  Eyes: negative, wears glasses    Ears/Nose/Throat: negative  Respiratory: No shortness of breath or hemoptysis; smoker's cough; hx of sleep apnea--not wearing it (can't breath at night)  Cardiovascular: negative  Gastrointestinal:  "negative  Genitourinary: negative  Musculoskeletal: negative; hx of left shoulder pain and left knee   Neurologic: HA--once in a while  Psychiatric: negative  Hematologic/Lymphatic/Immunologic: negative  Endocrine: positive for diabetes     OBJECTIVE:  /80   Pulse 91   Resp 16   Ht 1.6 m (5' 3\")   Wt 85.9 kg (189 lb 4.8 oz)   SpO2 94%   BMI 33.53 kg/m    Constitutional: healthy, alert and no distress  Musculoskeletal: extremities normal- no gross deformities noted and gait normal  Skin: no suspicious lesions or rashes to visible skin  Psychiatric: mentation appears normal and affect normal/bright    LABS  No results found for any visits on 09/09/21.    ASSESSMENT / PLAN:  (F17.200) Nicotine dependence, uncomplicated, unspecified nicotine product type  (primary encounter diagnosis)  Comment: noted  Plan:   Spoke with patient regarding options for smoking cessation.  Various pharmacologic options and behavioral techniques were reviewed.  The relative effectiveness as well as risks and benefits were reviewed with patient.  Patient is interested in: refused    Smoking Cessation    Marly's aware to let us know when she's ready to quit smoking.  Discussed the dangers of smoking with diabetes      (E11.65,  Z79.4) Type 2 diabetes mellitus with hyperglycemia, with long-term current use of insulin (H)  Comment: noted BG ave.  Plan: insulin aspart (NOVOLOG VIAL) 100 UNITS/ML         vial, dulaglutide (TRULICITY) 1.5 MG/0.5ML pen          Continue working on giving the following for clicks for meals:   Small meals: 3 clicks  Bigger meal: 4 clicks (like pasta)    Education started on Trulicity  Marly was able to use the demo Trulicity pen without any issues.     (E11.9) Type 2 diabetes, HbA1c goal < 7% (H)  Comment: noted  Plan: DISCONTINUED: insulin syringes, disposable, (BD        INSULIN SYRINGE) U-100 1 ML MISC          As stated above    Follow up  5 weeks    Patient Instructions   Continue working on " healthy eating and moving (start low and slow, work up to 30 min, 5x/week)    BG goals:  Fasting and before meals <130, >70  2 hour after eating <180    We only need 1/2 of these numbers to be within target then your A1c will be within target    Medication changes   1.  Vgo 40  meals: 3 clicks  Bigger meal: 4 clicks    2.  Jardiance (no change)  25 mg daily    3. Trulicity   Stop the Ozempic  Starting next Thursday,   Trulicity 1.5 mg weekly x 4    Follow up   5 weeks    Call me sooner if any problems/concerns and/or questions develop including consistent low BGs <70 or consistent high BGs >200  [phone] (Unit Coordinator)    [phone] (Anna, Nurse)    Smoking:   Try keep working on reducing the amount of cigarettes           Time: 35 minutes  Barrier: see PMH  Willingness to learn: accepting    Courtney PINTO [facility]  Diabetes and Wound Care    Cc: Amberly Whyte NP    35 minutes was spent with patient.    All of this time was spent on counseling patient regarding illness, medication and/or treatment options, coordinating further cares and follow ups that are needed along with resource material that will be helpful in the treatment of these issues.       With the electronic record, we can now more quickly and easily track our patient diabetic goals. Our diabetes clinical review is in progress and these are the indicators we are monitoring for good diabetes health.     1.) HbA1C less than 7 (measurement of your average blood sugars)  2.) Blood Pressure less than 140/80  3.) LDL less than 100 (bad cholesterol)  4.) HbA1C is checked in the last 6 months and below 7% (more frequently if not at goal or adjusting medications)  5.) LDL is checked in the last 12 months (more frequently if not at goal or adjusting medications)  6.) Taking one baby aspirin daily (unless otherwise instructed)  7.) No tobacco use  8) Statin use     You have achieved 6 out of 8 of these and I am encouraging you to come in and get tuned  up to achieve 8 out of 8!  Here is what you have achieved so far in my goals for you:  1.) HbA1C  less than 7:                              NO  Your last  HbA1C :  Lab Results   Component Value Date    A1C 9.8 08/12/2021    A1C >14.0 05/10/2021    A1C 10.7 02/10/2021    A1C 7.6 11/09/2020    A1C 7.9 08/07/2020    A1C 8.1 05/04/2020     2.) Blood Pressure less than 140/80:       YES      Your last    BP Readings from Last 1 Encounters:   09/09/21 112/80     3.) LDL less than 100:                              YES      Your last     LDL Cholesterol Calculated   Date Value Ref Range Status   11/09/2020 74 <100 mg/dL Final     Comment:     Desirable:       <100 mg/dl       4.) Checked HbA1C in the past 6 months: YES      5.) Checked LDL in the past 12 months:    YES      6.) Taking one aspirin daily:                       YES     7.) No tobacco use:                                        NO   8.) Statin use      YES

## 2021-09-09 NOTE — PATIENT INSTRUCTIONS
Continue working on healthy eating and moving (start low and slow, work up to 30 min, 5x/week)    BG goals:  Fasting and before meals <130, >70  2 hour after eating <180    We only need 1/2 of these numbers to be within target then your A1c will be within target    Medication changes   1.  Vgo 40  meals: 3 clicks  Bigger meal: 4 clicks    2.  Jardiance (no change)  25 mg daily    3. Trulicity   Stop the Ozempic  Starting next Thursday,   Trulicity 1.5 mg weekly x 4    Follow up   5 weeks    Call me sooner if any problems/concerns and/or questions develop including consistent low BGs <70 or consistent high BGs >200  [phone] (Unit Coordinator)    [phone] (Anna, Nurse)    Smoking:   Try keep working on reducing the amount of cigarettes

## 2021-09-10 ENCOUNTER — TELEPHONE (OUTPATIENT)
Dept: EDUCATION SERVICES | Facility: OTHER | Age: 56
End: 2021-09-10

## 2021-09-10 DIAGNOSIS — E11.65 TYPE 2 DIABETES MELLITUS WITH HYPERGLYCEMIA, WITH LONG-TERM CURRENT USE OF INSULIN (H): Primary | ICD-10-CM

## 2021-09-10 DIAGNOSIS — Z79.4 TYPE 2 DIABETES MELLITUS WITH HYPERGLYCEMIA, WITH LONG-TERM CURRENT USE OF INSULIN (H): Primary | ICD-10-CM

## 2021-09-10 ASSESSMENT — ANXIETY QUESTIONNAIRES: GAD7 TOTAL SCORE: 3

## 2021-09-10 NOTE — TELEPHONE ENCOUNTER
Pt calls she received some BD insulin syringes and is not sure why she got them. I asked her if she uses these to draw up her insulin and them put into the Vgo, but she stated the Vgo comes prefilled. Does this sound correct?

## 2021-09-20 NOTE — TELEPHONE ENCOUNTER
Courtney Chaudhary APRN CNP  You 1 hour ago (9:48 AM)     DS    Please call Marly and tell her the following:     Vgo 40 was ordered.   Please apologize as syringes were ordered incorrectly.   If she doesn't use them, she can donate them to [facility] (if she would like)     Let me know if she has questions.     Thanks     Courtney    Message text      You  Courtney Chaudhary APRN CNP 5 days ago     SR    Did you find anything out?    Message text      Courtney Chaudhary APRN CNP  You 7 days ago     DS    They don't come prefilled.   She has to fill them with the vgo apparatus that comes with the vgo.       Let me look into it.       Courtney    Message text

## 2021-09-27 ENCOUNTER — TELEPHONE (OUTPATIENT)
Dept: FAMILY MEDICINE | Facility: OTHER | Age: 56
End: 2021-09-27

## 2021-09-27 NOTE — TELEPHONE ENCOUNTER
Myesha from  home care called, requesting verbal orders to continue weekly nurse visits. Please advise. Thanks!    [phone]

## 2021-10-07 DIAGNOSIS — N18.30 CKD (CHRONIC KIDNEY DISEASE) STAGE 3, GFR 30-59 ML/MIN (H): Primary | ICD-10-CM

## 2021-10-13 NOTE — PATIENT INSTRUCTIONS
In the morning:  - Test Blood Sugar when you wake up (before eating or drinking anything)  - Inject NovoLOG (orange pen) 4 units right before you eat breakfast    Before supper:  - Test Blood Sugar  - Inject Lantus (gray pen) 42 units  - Inject NovoLOG (orange pen) 4 units right before you eat supper    At bedtime:  - Test Blood Sugar      Return on Wednesday on 11/14/18 at 3:00 pm for CGM Evaluation - please bring your food log    Call Kat with any concerns or if you have lows - [phone]    I will call you later this week to see how you are doing   No

## 2021-10-14 ENCOUNTER — ALLIED HEALTH/NURSE VISIT (OUTPATIENT)
Dept: EDUCATION SERVICES | Facility: OTHER | Age: 56
End: 2021-10-14
Attending: NURSE PRACTITIONER
Payer: COMMERCIAL

## 2021-10-14 VITALS
SYSTOLIC BLOOD PRESSURE: 115 MMHG | HEART RATE: 87 BPM | RESPIRATION RATE: 16 BRPM | DIASTOLIC BLOOD PRESSURE: 80 MMHG | BODY MASS INDEX: 34.73 KG/M2 | HEIGHT: 63 IN | OXYGEN SATURATION: 97 % | WEIGHT: 196 LBS

## 2021-10-14 DIAGNOSIS — F17.200 NICOTINE DEPENDENCE, UNCOMPLICATED, UNSPECIFIED NICOTINE PRODUCT TYPE: ICD-10-CM

## 2021-10-14 DIAGNOSIS — E11.65 TYPE 2 DIABETES MELLITUS WITH HYPERGLYCEMIA, WITH LONG-TERM CURRENT USE OF INSULIN (H): Primary | ICD-10-CM

## 2021-10-14 DIAGNOSIS — N18.30 CKD (CHRONIC KIDNEY DISEASE) STAGE 3, GFR 30-59 ML/MIN (H): ICD-10-CM

## 2021-10-14 DIAGNOSIS — Z79.4 TYPE 2 DIABETES MELLITUS WITH HYPERGLYCEMIA, WITH LONG-TERM CURRENT USE OF INSULIN (H): Primary | ICD-10-CM

## 2021-10-14 LAB
ALBUMIN SERPL-MCNC: 3.7 G/DL (ref 3.4–5)
ANION GAP SERPL CALCULATED.3IONS-SCNC: 3 MMOL/L (ref 3–14)
BUN SERPL-MCNC: 23 MG/DL (ref 7–30)
CALCIUM SERPL-MCNC: 9.7 MG/DL (ref 8.5–10.1)
CHLORIDE BLD-SCNC: 105 MMOL/L (ref 94–109)
CO2 SERPL-SCNC: 30 MMOL/L (ref 20–32)
CREAT SERPL-MCNC: 1.11 MG/DL (ref 0.52–1.04)
GFR SERPL CREATININE-BSD FRML MDRD: 55 ML/MIN/1.73M2
GLUCOSE BLD-MCNC: 224 MG/DL (ref 70–99)
MAGNESIUM SERPL-MCNC: 2.4 MG/DL (ref 1.6–2.3)
PHOSPHATE SERPL-MCNC: 3.9 MG/DL (ref 2.5–4.5)
POTASSIUM BLD-SCNC: 4.9 MMOL/L (ref 3.4–5.3)
SODIUM SERPL-SCNC: 138 MMOL/L (ref 133–144)

## 2021-10-14 PROCEDURE — 83735 ASSAY OF MAGNESIUM: CPT | Mod: ZL

## 2021-10-14 PROCEDURE — 99213 OFFICE O/P EST LOW 20 MIN: CPT | Performed by: NURSE PRACTITIONER

## 2021-10-14 PROCEDURE — G0463 HOSPITAL OUTPT CLINIC VISIT: HCPCS

## 2021-10-14 PROCEDURE — 36415 COLL VENOUS BLD VENIPUNCTURE: CPT | Mod: ZL

## 2021-10-14 PROCEDURE — 80069 RENAL FUNCTION PANEL: CPT | Mod: ZL

## 2021-10-14 RX ORDER — DULAGLUTIDE 3 MG/.5ML
3 INJECTION, SOLUTION SUBCUTANEOUS
Qty: 2 ML | Refills: 0 | Status: SHIPPED | OUTPATIENT
Start: 2021-10-14 | End: 2021-12-20

## 2021-10-14 ASSESSMENT — MIFFLIN-ST. JEOR: SCORE: 1438.18

## 2021-10-14 ASSESSMENT — PAIN SCALES - GENERAL: PAINLEVEL: NO PAIN (0)

## 2021-10-14 NOTE — PATIENT INSTRUCTIONS
Continue working on healthy eating and moving (start low and slow, work up to 30 min, 5x/week)    BG goals:  Fasting and before meals <130, >70  2 hour after eating <180    We only need 1/2 of these numbers to be within target then your A1c will be within target    Medication changes   1.  Vgo 40  meals: 3 clicks  Bigger meal: 4 clicks  Snacks: 1 click    2.  Jardiance (no change)  25 mg daily    3. Trulicity   Increase to 3 mg weekly    Follow up   Download when you see Amberly Whyte NP    Call me sooner if any problems/concerns and/or questions develop including consistent low BGs <70 or consistent high BGs >200  [phone] (Unit Coordinator)    [phone] (Anna Nurse)    Smoking:   Try keep working on reducing the amount of cigarettes

## 2021-10-14 NOTE — PROGRESS NOTES
SUBJECTIVE:  Marly Cannon, 56 year old, female presents with the following Chief Complaint(s) with HPI to follow:  Chief Complaint   Patient presents with     Diabetes        Diabetes Follow-up    Patient is checking blood sugars: 1-2x/day.  Results:  B  Highest: 213  Lowest: 70      Symptoms of hypoglycemia (low blood sugar): no    Paresthesias (numbness or burning in feet) or sores: yes, no sores    Diabetic eye exam within the last year: UTD    Breakfast eaten regularly: most of the time    Patient counting carbs: Yes    Exercising: depends on the weather          HPI:  Marly's here today for the follow up regarding her Diabetes mellitus, Type 2.    Lab Results   Component Value Date    A1C 9.8 2021    A1C >14.0 05/10/2021    A1C 10.7 02/10/2021    A1C 7.6 2020    A1C 7.9 2020    A1C 8.1 2020     Current Diabetes medication:   1. Vgo40 --Novolog, 3 clicks with meals, 1 click with snack   2. Trulicity 1.5 mg weekly (Tuesday)   3.  Jardiance 25 mg daily  Statin use: yes, simvastatin 20 mg daily  ASA: yes, 81 mg daily    Marly's here for a meter download and possible diabetic medication adjustments.   Reports the following:  Denies any issues with the Trulicity  Needs a refill.      KHADIJAH Brunner continues to set up her medication weekly (Monday or Tuesday)    Denies any new health concerns.      Weight trend   Wt Readings from Last 4 Encounters:   10/14/21 88.9 kg (196 lb)   21 85.9 kg (189 lb 4.8 oz)   21 86.2 kg (190 lb)   21 87.5 kg (193 lb)     Asking if she could get her labs drawn today for Dr. Eason    Patient Active Problem List   Diagnosis     Type 2 diabetes, HbA1c goal < 7% (H)     ACP (advance care planning)     Stage 3 chronic kidney disease (H)     Pulmonary emphysema (H)     Hyperparathyroidism due to renal insufficiency (H)     Morbid obesity (H)     Vitamin D deficiency     Intellectual disability     Breast fibrocystic disorder     Tobacco  abuse     Microalbuminuria due to type 2 diabetes mellitus (H)     H/O colonoscopy     [place] cardiac risk <10% in next 10 years     Tubular adenoma of colon     Hyperlipidemia, unspecified hyperlipidemia type     Essential hypertension     Callus of foot     Memory disturbance     ADEEL (obstructive sleep apnea)       No past medical history on file.    Past Surgical History:   Procedure Laterality Date     COLONOSCOPY N/A 8/20/2015    Procedure: COLONOSCOPY;  Surgeon: Gentry Porter MD;  Location: [facility]     COLONOSCOPY N/A 9/21/2018    Procedure: COLONOSCOPY;  COLONOSCOPY WITH POLYPECTOMY;  Surgeon: Gentry Porter MD;  Location: [facility]       Family History   Problem Relation Age of Onset     Diabetes Mother      Diabetes Father      Hypertension Brother      Diabetes Sister        Social History     Tobacco Use     Smoking status: Current Every Day Smoker     Packs/day: 1.00     Years: 34.00     Pack years: 34.00     Types: Cigarettes     Start date: 1/1/1979     Smokeless tobacco: Never Used     Tobacco comment: Declined QP 05/13/2020   Substance Use Topics     Alcohol use: No     Alcohol/week: 0.0 standard drinks       Current Outpatient Medications   Medication Sig Dispense Refill     Acetaminophen (TYLENOL PO) Take 325 mg by mouth every 6 hours as needed        amLODIPine (NORVASC) 5 MG tablet TAKE 1 TABLET BY MOUTH DAILY 90 tablet 0     ammonium lactate (LAC-HYDRIN) 12 % cream Apply topically 2 times daily       aspirin (ASPIRIN LOW DOSE) 81 MG chewable tablet CHEW 1 TABLET BY MOUTH DAILY. DO NOT SPLIT OR CRUSH 90 tablet 0     atorvastatin (LIPITOR) 40 MG tablet TAKE 1 TABLET BY MOUTH AT BEDTIME 90 tablet 2     blood glucose (ONETOUCH ULTRA) test strip Use to test blood sugar 4 times daily 150 strip 3     budesonide-formoterol (SYMBICORT) 160-4.5 MCG/ACT Inhaler Inhale 2 puffs into the lungs 2 times daily 1 Inhaler 3     Cholecalciferol (VITAMIN D3) 50 MCG (2000 UT) CAPS TAKE 1 CAPSULE BY MOUTH  DAILY 90 capsule 3     COMBIVENT RESPIMAT  MCG/ACT inhaler INHALE 1 PUFF INTO THE LUNGS 4 TIMES DAILY 4 g 0      MG capsule TAKE 1 CAPSULE BY MOUTH DAILY 90 capsule 0     Dulaglutide (TRULICITY) 3 MG/0.5ML SOPN Inject 3 mg Subcutaneous every 7 days 2 mL 0     empagliflozin (JARDIANCE) 25 MG TABS tablet Take 1 tablet (25 mg) by mouth daily 30 tablet 3     hydrocortisone (CORTAID) 1 % external cream Apply topically 2 times daily 453.6 g 0     hydrocortisone 2.5 % cream Apply topically 2 times daily       insulin aspart (NOVOLOG VIAL) 100 UNITS/ML vial To be used with the V40.  TDD: 76 units 40 mL 3     insulin pen needle (32G X 4 MM) 32G X 4 MM miscellaneous Use 1 pen needles daily 100 each 3     lisinopril (ZESTRIL) 40 MG tablet Take 1 tablet (40 mg) by mouth daily 90 tablet 3     Magnesium Cl-Calcium Carbonate 71.5-119 MG TBEC Take 2 tablets by mouth       nystatin (MYCOSTATIN) 837236 UNIT/GM external powder Apply topically 3 times daily 60 g 3     ONETOUCH DELICA LANCETS 33G MISC 1 each 3 times daily (Patient taking differently: 1 each 4 times daily ) 100 each 10     order for DME Women briefs 50 each 1     polyethylene glycol (MIRALAX) 17 g packet Take 17 g by mouth 3 times daily       primidone (MYSOLINE) 50 MG tablet TAKE 1 TABLET BY MOUTH AT BEDTIME 30 tablet 4     semaglutide (OZEMPIC, 1 MG/DOSE,) 2 MG/1.5ML pen Inject 1 mg Subcutaneous every 7 days 9 mL 1     tiZANidine (ZANAFLEX) 4 MG tablet Take 1 tablet (4 mg) by mouth 3 times daily as needed for muscle spasms 30 tablet 0     triamcinolone (KENALOG) 0.1 % cream Apply topically 2 times daily       wearable insulin delivery device (M86 Security 40) kit Insulin delivery device to be changed every 24 hours 30 each 4       No Known Allergies    REVIEW OF SYSTEMS  Skin: negative  Eyes: negative, wears glasses    Ears/Nose/Throat: negative  Respiratory: No shortness of breath or hemoptysis; smoker's cough; hx of sleep apnea  Cardiovascular:  "negative  Gastrointestinal: negative  Genitourinary: negative  Musculoskeletal: negative; hx of left shoulder pain and left knee   Neurologic: negative  Psychiatric: negative  Hematologic/Lymphatic/Immunologic: negative  Endocrine: positive for diabetes     OBJECTIVE:  /80   Pulse 87   Resp 16   Ht 1.6 m (5' 3\")   Wt 88.9 kg (196 lb)   SpO2 97%   BMI 34.72 kg/m    Constitutional: alert and no distress  Respiratory:  Good diaphragmatic excursion.   Musculoskeletal: extremities normal- no gross deformities noted and gait normal  Skin: no suspicious lesions or rashes to visible skin  Psychiatric: mentation appears normal and affect normal/bright      LABS  No results found for any visits on 10/14/21.    ASSESSMENT / PLAN:  (E11.65,  Z79.4) Type 2 diabetes mellitus with hyperglycemia, with long-term current use of insulin (H)  (primary encounter diagnosis)  Comment: noted BG ave  Plan: Dulaglutide (TRULICITY) 3 MG/0.5ML SOPN          Check her A1c (no charge): 8.7%--heading in the right direction.     Will increase her Trulicity to 3 mg weekly    Will check her kidney status to see if Jardiance is still appropriate.      Education focused on taking her medications as prescribed and enter a bolus when she consumes carbs    (F17.200) Nicotine dependence, uncomplicated, unspecified nicotine product type  Comment: noted and refused  Plan:   Spoke with patient regarding options for smoking cessation.  Various pharmacologic options and behavioral techniques were reviewed.  The relative effectiveness as well as risks and benefits were reviewed with patient.  Patient is interested in: n/a    Smoking Cessation    Marly's aware to let us know when she's ready to quit smoking.  Discussed the dangers of smoking with diabetes    Follow up  Download when she sees Amberly Whyte NP    Patient Instructions   Continue working on healthy eating and moving (start low and slow, work up to 30 min, 5x/week)    BG goals:  Fasting " and before meals <130, >70  2 hour after eating <180    We only need 1/2 of these numbers to be within target then your A1c will be within target    Medication changes   1.  Vgo 40  meals: 3 clicks  Bigger meal: 4 clicks  Snacks: 1 click    2.  Jardiance (no change)  25 mg daily    3. Trulicity   Increase to 3 mg weekly    Follow up   Download when you see Amberly Whyte, NP    Call me sooner if any problems/concerns and/or questions develop including consistent low BGs <70 or consistent high BGs >200  [phone] (Unit Coordinator)    [phone] (Anna, Nurse)    Smoking:   Try keep working on reducing the amount of cigarettes           Time: 30 minutes  Barrier: see [facility]  Willingness to learn: accepting    Courtney PINTO [facility]-BC  Diabetes and Wound Care    Cc: Amberly Whyte NP    With the electronic record, we can now more quickly and easily track our patient diabetic goals. Our diabetes clinical review is in progress and these are the indicators we are monitoring for good diabetes health.     1.) HbA1C less than 7 (measurement of your average blood sugars)  2.) Blood Pressure less than 140/80  3.) LDL less than 100 (bad cholesterol)  4.) HbA1C is checked in the last 6 months and below 7% (more frequently if not at goal or adjusting medications)  5.) LDL is checked in the last 12 months (more frequently if not at goal or adjusting medications)  6.) Taking one baby aspirin daily (unless otherwise instructed)  7.) No tobacco use  8) Statin use     You have achieved 6 out of 8 of these and I am encouraging you to come in and get tuned up to achieve 8 out of 8!  Here is what you have achieved so far in my goals for you:  1.) HbA1C  less than 7:                              NO  Your last  HbA1C :  Lab Results   Component Value Date    A1C 9.8 08/12/2021    A1C >14.0 05/10/2021    A1C 10.7 02/10/2021    A1C 7.6 11/09/2020    A1C 7.9 08/07/2020    A1C 8.1 05/04/2020     2.) Blood Pressure less than 140/80:       YES       Your last    BP Readings from Last 1 Encounters:   10/14/21 115/80     3.) LDL less than 100:                              YES      Your last     LDL Cholesterol Calculated   Date Value Ref Range Status   11/09/2020 74 <100 mg/dL Final     Comment:     Desirable:       <100 mg/dl       4.) Checked HbA1C in the past 6 months: YES      5.) Checked LDL in the past 12 months:    YES      6.) Taking one aspirin daily:                       YES     7.) No tobacco use:                                        NO   8.) Statin use      YES

## 2021-10-14 NOTE — PROGRESS NOTES
"Chief Complaint   Patient presents with     Diabetes       Initial /80   Pulse 87   Resp 16   Ht 1.6 m (5' 3\")   Wt 88.9 kg (196 lb)   SpO2 97%   BMI 34.72 kg/m   Estimated body mass index is 34.72 kg/m  as calculated from the following:    Height as of this encounter: 1.6 m (5' 3\").    Weight as of this encounter: 88.9 kg (196 lb).  Medication Reconciliation: complete  Gege Walter LPN    "

## 2021-10-18 ENCOUNTER — MEDICAL CORRESPONDENCE (OUTPATIENT)
Dept: HEALTH INFORMATION MANAGEMENT | Facility: CLINIC | Age: 56
End: 2021-10-18

## 2021-10-20 DIAGNOSIS — K59.00 CONSTIPATION, UNSPECIFIED CONSTIPATION TYPE: ICD-10-CM

## 2021-10-20 NOTE — PROGRESS NOTES
"1. Comprehensive Sleep Study [[id number]] ordered by Anupam Duval MD at 12/11/20 1221      Patient summary:  55 year old yo female who is referred for concern for sleep-disordered breathing as part of work-up of elevated hemoglobin.     Pertinent PMHx of morbid obesity, HTN, elevated Hgb, DM II, COPD.     Most recent PFT's on 8/18/2015 consistent with airway obstruction, mild decrease DLCO with suspicion for concurrent restrictive disease.     Results for MATTHEW STEEN (MRN [id number]) as of 12/28/2020 10:27    Ref. Range 12/3/2016 16:48 9/21/2018 09:23 7/1/2019 11:30 5/21/2020 13:35 11/9/2020 08:42   Hemoglobin Latest Ref Range: 11.7 - 15.7 g/dL 16.6 (H) 17.9 (H) 16.9 (H) 16.1 (H) 16.7 (H)         STOP-BANG score of 4, with unknown neck circumference.  [place] score of 9.  BMI of Estimated body mass index is 34.95 kg/m  as calculated from the following:    Height as of 11/9/20: 1.619 m (5' 3.75\").    Weight as of 11/9/20: 91.6 kg (202 lb).      Per questionnaire: not answered     Caffeine use:  No for 3+ per day.  No for within 6 hours of bed.     Tobacco use: Yes        Interp for PSG dated 12/16/2020.     Weight 202 lbs.  Total sleep time 417.5 minutes, sleep efficiency 83%, sleep latency 15 minutes, REM latency - minutes.  Arousal index 45.6.  Sleep architecture was incredibly fragmented with no clear REM observed.     AHI 43.1, events appearing primarily obstructive in nature.  Mean Spo2 86%, isabel SpO2 78%, 96.2% of recording was <= 89%.       EKG appeared NSR.     No PLM's observed.      Unable to assess for phasic or tonic EMG activity in REM.  No abnormal nocturnal behaviors observed.     Assessment:  - Severe ADEEL with severe sleep-associated hypoxemia and sustained hypoxemia, with increased concern for hypoventilation.  - Potential that severity under-reported given highly fragmented sleep architecture and no clear REM observed.     Recommendations:  - Typically, we would proceed with an " urgent all-night Pap titration with likely need for bilevel and possibly nocturnal supplemental oxygen.  This is currently not possible due to COVID-19 precautions and restrictions.  -Given the sustained hypoxemia and severe nature of her obstructive sleep apnea, we will plan to proceed with bilevel Pap auto titrate with minimum EPAP 5, maximum IPAP 20, pressure support 7 on room air.  -If bilevel as tolerated and AHI is below 10, then plan to proceed with a follow-up overnight oximetry on treatment.  -Would also recommend repeat full pulmonary function test..  - Recommend weight management.     ---  This note was written with the assistance of the Dragon voice-dictation technology software. The final document, although reviewed, may contain errors. For corrections, please contact the office.     Anupam Duval MD     Sleep Medicine  [facility]  ([phone])  [facility]  ([phone])

## 2021-10-21 ENCOUNTER — TRANSFERRED RECORDS (OUTPATIENT)
Dept: HEALTH INFORMATION MANAGEMENT | Facility: CLINIC | Age: 56
End: 2021-10-21

## 2021-10-21 RX ORDER — DOCUSATE SODIUM 100 MG/1
CAPSULE, LIQUID FILLED ORAL
Qty: 90 CAPSULE | Refills: 0 | Status: SHIPPED | OUTPATIENT
Start: 2021-10-21 | End: 2022-02-15

## 2021-10-21 NOTE — TELEPHONE ENCOUNTER
Natali      Last Written Prescription Date:  6/29/2021  Last Fill Quantity: 90,   # refills: 0  Last Office Visit: 8/17/2021  Future Office visit:    Next 5 appointments (look out 90 days)    Nov 17, 2021  2:00 PM  (Arrive by 1:45 PM)  Nurse Only with Hc Dm Nurse  [facility] [place] ([facility] [place] ) [address] [place] [address] [phone]   Nov 17, 2021  2:10 PM  (Arrive by 1:55 PM)  Office Visit with Amberly Whyte NP  [facility] [place] ([facility] [place] ) [address]  [place] [address]  [phone]

## 2021-10-22 ENCOUNTER — APPOINTMENT (OUTPATIENT)
Dept: LAB | Facility: OTHER | Age: 56
End: 2021-10-22
Payer: COMMERCIAL

## 2021-10-25 ENCOUNTER — TELEPHONE (OUTPATIENT)
Dept: EDUCATION SERVICES | Facility: OTHER | Age: 56
End: 2021-10-25

## 2021-10-25 NOTE — PROGRESS NOTES
Discontinued Ozempic.     Patient is now on Trulicity    Medication record updated.     Courtney Chaudhary APRN FNP-BC  Diabetes and Wound Care

## 2021-10-25 NOTE — TELEPHONE ENCOUNTER
----- Message from BLAIR Caruso CNP sent at 10/25/2021  8:36 AM CDT -----  Regarding: RE: medicaiton  Medication record updated.   Ozempic discontinued  Started Trulicity.     Thanks    Courtney  ----- Message -----  From: Justa Casanova LPN  Sent: 10/22/2021   1:24 PM CDT  To: BLAIR Caruso CNP  [facility]     Patient saw Dr. Eason nephrology on 10/22/2021 and he was questioning the ozempic and Trulicity if patient was suppose to be on both medications. He stated he wanted to let someone know.     Justa Casanova LPN

## 2021-11-17 NOTE — PROGRESS NOTES
Assessment & Plan     Essential hypertension  Well controlled. Continue current medications. Encouraged daily exercise and a low sodium diet. Recommended checking BP's 2x/wk, call the clinic if consistantly s>140 or d>90. Follow up in 3 months.     Hyperlipidemia, unspecified hyperlipidemia type  Lipids controlled. Liver function normal. Continue statin.     Type 2 diabetes, HbA1c goal < 7% (H)  A1C 8.6. [facility]. On statin. On asa. BP at goal. Eye exam UTD. See me 3 months.     Microalbuminuria due to type 2 diabetes mellitus (H)  On ACE. Will continue.     Tobacco abuse  Cessation encouraged.     Pulmonary emphysema, unspecified emphysema type (H)  Stable. Oxygen sats 92 percent which is around her baseline. Continue current inhalers. Smoking cessation encouraged. Declined low dose lung cancer screening.     Stage 3 chronic kidney disease, unspecified whether stage 3a or 3b CKD (H)  Stable. Baseline creatinine 1.1-1.2. Will avoid NSAIDs.     Elevated Hgb  Hgb 18.6. Declines to see hematology. Aware of the risks. Most likely from her COPD. I also suspect she has sleep apnea, but refuses to have a sleep study. She is on asa. Will recheck 3 months.     Callus of foot  Refuses to see podiatry. She has seen them in the past and never followed their recommendation. Will again try urea cream.       Tobacco Cessation:   reports that she has been smoking cigarettes. She started smoking about 43 years ago. She has a 34.00 pack-year smoking history. She has never used smokeless tobacco.  Tobacco Cessation Action Plan: Information offered: Patient not interested at this time    Amberly Whyte NP  [facility] - [facility]   Marly is a 56 year old who presents for the following health issues    HPI     Diabetes Follow-up    How often are you checking your blood sugar? Two times daily  Blood sugar testing frequency justification:  Uncontrolled diabetes  What time of day  are you checking your blood sugars (select all that apply)?  Before meals  Have you had any blood sugars above 200?  No  Have you had any blood sugars below 70?  No    What symptoms do you notice when your blood sugar is low?  Shaky, Dizzy, Weak and Lethargy    What concerns do you have today about your diabetes? None     Do you have any of these symptoms? (Select all that apply)  No numbness or tingling in feet.  No redness, sores or blisters on feet.  No complaints of excessive thirst.  No reports of blurry vision.  No significant changes to weight.     A1C was 9.8 on 8/12/21    Taking insulin aspart, Jardiance, dulaglutide without side effects.     On asa.     She denies chest pain, shortness of breath, dizziness, syncope, or palpitations.     Microalbuminuria present. On ACE.     She does have chronic kidney disease. Typically avoids NSAIDs.     No abdominal pain. No nausea or vomiting.       BP Readings from Last 2 Encounters:   01/14/22 112/76   10/14/21 115/80     Hemoglobin A1C POCT (%)   Date Value   05/10/2021 >14.0 (H)   02/10/2021 10.7 (H)     Hemoglobin A1C (%)   Date Value   01/14/2022 8.6 (H)   08/12/2021 9.8 (H)     LDL Cholesterol Calculated (mg/dL)   Date Value   01/14/2022 74   11/09/2020 74   02/04/2020 49     Hyperlipidemia Follow-Up      Are you regularly taking any medication or supplement to lower your cholesterol?   Yes- atorvastatin    Are you having muscle aches or other side effects that you think could be caused by your cholesterol lowering medication?  No     As noted above, she denies chest pain, shortness of breath, dizziness, syncope, or palpitations.      Hypertension Follow-up      Do you check your blood pressure regularly outside of the clinic? No     Are you following a low salt diet? No    Are your blood pressures ever more than 140 on the top number (systolic) OR more   than 90 on the bottom number (diastolic), for example 140/90? N/A  -Taking Amlodipine 5 mg with lisinopril  40 mg. No side effects.  -As noted above, she denies chest pain, shortness of breath, dizziness, syncope, or palpitations.     COPD Follow-Up    Overall, how are your COPD symptoms since your last clinic visit?  Better    How much fatigue or shortness of breath do you have when you are walking?  Same as usual    How much shortness of breath do you have when you are resting?  Same as usual    How often do you cough? Rarely    Have you noticed any change in your sputum/phlegm?  No    Have you experienced a recent fever? No    Please describe how far you can walk without stopping to rest:  2-5 blocks    How many flights of stairs are you able to walk up without stopping?  2    Have you had any Emergency Room Visits, Urgent Care Visits, or Hospital Admissions because of your COPD since your last office visit?  No     Taking Combivent and Symbicort with PRN albuterol. She feels her breathing is stable. No concerns.     Continues to smoke, smoking 1 ppd. No interest in quitting.      Due for lung cancer screening. Declines. .     History   Smoking Status     Current Every Day Smoker     Packs/day: 1.00     Years: 34.00     Types: Cigarettes     Start date: 1/1/1979   Smokeless Tobacco     Never Used     Comment: Declined QP 05/13/2020     No results found for: FEV1, NYJ3WUP    Chronic Kidney Disease Follow-up    Do you take any over the counter pain medicine?: No      How many servings of fruits and vegetables do you eat daily?  2-3    On average, how many sweetened beverages do you drink each day (Examples: soda, juice, sweet tea, etc.  Do NOT count diet or artificially sweetened beverages)?   1    How many days per week do you exercise enough to make your heart beat faster? 3 or less    How many minutes a day do you exercise enough to make your heart beat faster? 9 or less    How many days per week do you miss taking your medication? 0      Review of Systems   Constitutional, HEENT, cardiovascular, pulmonary, gi and gu  "systems are negative, except as otherwise noted.      Objective    /76 (BP Location: Left arm, Patient Position: Chair, Cuff Size: Adult Large)   Pulse 98   Temp 99.2  F (37.3  C) (Tympanic)   Ht 1.6 m (5' 3\")   Wt 86.5 kg (190 lb 9.6 oz)   SpO2 92%   BMI 33.76 kg/m    Body mass index is 33.76 kg/m .  Physical Exam   GENERAL: alert, no distress and appears older than stated age  EYES: Eyes grossly normal to inspection, PERRL and conjunctivae and sclerae normal  HENT: ear canals and TM's normal, nose and mouth without ulcers or lesions  NECK: no adenopathy, no asymmetry, masses, or scars and thyroid normal to palpation  RESP: lungs clear to auscultation - no rales, rhonchi or wheezes  CV: regular rate and rhythm, no murmur, click or rub, no peripheral edema  NEURO: Normal strength and tone, mentation intact and speech normal  PSYCH: mentation appears normal, affect normal/bright  Diabetic foot exam: normal sensory exam and DP and TP pulses present, but diminished, no open sores, but she has many calluses on bilateral feet    Results for orders placed or performed in visit on 01/14/22 (from the past 24 hour(s))   Hemoglobin A1c   Result Value Ref Range    Estimated Average Glucose 200 mg/dL    Hemoglobin A1C 8.6 (H) 0.0 - 5.6 %   Lipid Profile (Chol, Trig, HDL, LDL calc)   Result Value Ref Range    Cholesterol 154 <200 mg/dL    Triglycerides 168 (H) <150 mg/dL    Direct Measure HDL 46 (L) >=50 mg/dL    LDL Cholesterol Calculated 74 <=100 mg/dL    Non HDL Cholesterol 108 <130 mg/dL    Patient Fasting > 8hrs? Yes     Narrative    Cholesterol  Desirable:  <200 mg/dL    Triglycerides  Normal:  Less than 150 mg/dL  Borderline High:  150-199 mg/dL  High:  200-499 mg/dL  Very High:  Greater than or equal to 500 mg/dL    Direct Measure HDL  Female:  Greater than or equal to 50 mg/dL   Male:  Greater than or equal to 40 mg/dL    LDL Cholesterol  Desirable:  <100mg/dL  Above Desirable:  100-129 mg/dL   Borderline " High:  130-159 mg/dL   High:  160-189 mg/dL   Very High:  >= 190 mg/dL    Non HDL Cholesterol  Desirable:  130 mg/dL  Above Desirable:  130-159 mg/dL  Borderline High:  160-189 mg/dL  High:  190-219 mg/dL  Very High:  Greater than or equal to 220 mg/dL   Comprehensive metabolic panel (BMP + Alb, Alk Phos, ALT, AST, Total. Bili, TP)   Result Value Ref Range    Sodium 136 133 - 144 mmol/L    Potassium 4.1 3.4 - 5.3 mmol/L    Chloride 103 94 - 109 mmol/L    Carbon Dioxide (CO2) 29 20 - 32 mmol/L    Anion Gap 4 3 - 14 mmol/L    Urea Nitrogen 19 7 - 30 mg/dL    Creatinine 1.23 (H) 0.52 - 1.04 mg/dL    Calcium 10.1 8.5 - 10.1 mg/dL    Glucose 187 (H) 70 - 99 mg/dL    Alkaline Phosphatase 86 40 - 150 U/L    AST 13 0 - 45 U/L    ALT 16 0 - 50 U/L    Protein Total 8.2 6.8 - 8.8 g/dL    Albumin 4.1 3.4 - 5.0 g/dL    Bilirubin Total 0.6 0.2 - 1.3 mg/dL    GFR Estimate 51 (L) >60 mL/min/1.73m2   CBC with platelets and differential    Narrative    The following orders were created for panel order CBC with platelets and differential.  Procedure                               Abnormality         Status                     ---------                               -----------         ------                     CBC with platelets and d...[[id number]]  Abnormal            Final result                 Please view results for these tests on the individual orders.   CBC with platelets and differential   Result Value Ref Range    WBC Count 7.0 4.0 - 11.0 10e3/uL    RBC Count 6.03 (H) 3.80 - 5.20 10e6/uL    Hemoglobin 18.6 (H) 11.7 - 15.7 g/dL    Hematocrit 56.1 (H) 35.0 - 47.0 %    MCV 93 78 - 100 fL    MCH 30.8 26.5 - 33.0 pg    MCHC 33.2 31.5 - 36.5 g/dL    RDW 12.9 10.0 - 15.0 %    Platelet Count 240 150 - 450 10e3/uL    % Neutrophils 61 %    % Lymphocytes 23 %    % Monocytes 14 %    % Eosinophils 1 %    % Basophils 1 %    % Immature Granulocytes 0 %    NRBCs per 100 WBC 0 <1 /100    Absolute Neutrophils 4.3 1.6 - 8.3 10e3/uL    Absolute  Lymphocytes 1.6 0.8 - 5.3 10e3/uL    Absolute Monocytes 1.0 0.0 - 1.3 10e3/uL    Absolute Eosinophils 0.1 0.0 - 0.7 10e3/uL    Absolute Basophils 0.1 0.0 - 0.2 10e3/uL    Absolute Immature Granulocytes 0.0 <=0.4 10e3/uL    Absolute NRBCs 0.0 10e3/uL

## 2021-11-18 DIAGNOSIS — J43.9 PULMONARY EMPHYSEMA, UNSPECIFIED EMPHYSEMA TYPE (H): ICD-10-CM

## 2021-11-19 RX ORDER — IPRATROPIUM BROMIDE AND ALBUTEROL 20; 100 UG/1; UG/1
SPRAY, METERED RESPIRATORY (INHALATION)
Qty: 4 G | Refills: 2 | Status: SHIPPED | OUTPATIENT
Start: 2021-11-19 | End: 2022-08-29

## 2021-11-23 ENCOUNTER — TELEPHONE (OUTPATIENT)
Dept: FAMILY MEDICINE | Facility: OTHER | Age: 56
End: 2021-11-23
Payer: COMMERCIAL

## 2021-11-23 NOTE — TELEPHONE ENCOUNTER
[facility] requesting VO. Call back number: [phone]. ok to leave detailed message.   Continue weekly nurse visits. Writer approved.   Myesha will fax orders now.

## 2021-11-30 DIAGNOSIS — I10 ESSENTIAL HYPERTENSION: ICD-10-CM

## 2021-12-02 RX ORDER — AMLODIPINE BESYLATE 5 MG/1
TABLET ORAL
Qty: 90 TABLET | Refills: 0 | Status: SHIPPED | OUTPATIENT
Start: 2021-12-02 | End: 2022-03-01

## 2021-12-02 RX ORDER — ASPIRIN 81 MG/1
TABLET, CHEWABLE ORAL
Qty: 90 TABLET | Refills: 0 | Status: SHIPPED | OUTPATIENT
Start: 2021-12-02 | End: 2022-03-01

## 2021-12-02 NOTE — TELEPHONE ENCOUNTER
Norvasc      Last Written Prescription Date:  9/1/21  Last Fill Quantity: 90,   # refills: 0  Last Office Visit: 8/17/21  Future Office visit:    Next 5 appointments (look out 90 days)    Dec 20, 2021  8:45 AM  (Arrive by 8:30 AM)  Nurse Only with Hc Dm Nurse  [facility] [place] ([facility] [place] ) [address]  [place] [address]  [phone]   Dec 20, 2021  9:00 AM  (Arrive by 8:45 AM)  Office Visit with Amberly Whyte NP  [facility] [place] ([facility] [place] ) [address]  [place] [address]  [phone]           Routing refill request to provider for review/approval because:        ASA      Last Written Prescription Date:  9/8/21  Last Fill Quantity: 90,   # refills: 0  Last Office Visit: 8/17/21  Future Office visit:    Next 5 appointments (look out 90 days)    Dec 20, 2021  8:45 AM  (Arrive by 8:30 AM)  Nurse Only with Hc Dm Nurse  [facility] [place] ([facility] [place] ) [address]  [place] [address]  [phone]   Dec 20, 2021  9:00 AM  (Arrive by 8:45 AM)  Office Visit with Amberly Whyte NP  [facility] [place] ([facility] [place] ) [address]  [place] [address]  [phone]           Routing refill request to provider for review/approval because:

## 2021-12-08 ENCOUNTER — TELEPHONE (OUTPATIENT)
Dept: FAMILY MEDICINE | Facility: OTHER | Age: 56
End: 2021-12-08
Payer: COMMERCIAL

## 2021-12-20 ENCOUNTER — ALLIED HEALTH/NURSE VISIT (OUTPATIENT)
Dept: EDUCATION SERVICES | Facility: OTHER | Age: 56
End: 2021-12-20
Attending: NURSE PRACTITIONER
Payer: COMMERCIAL

## 2021-12-20 DIAGNOSIS — Z79.4 TYPE 2 DIABETES MELLITUS WITH HYPERGLYCEMIA, WITH LONG-TERM CURRENT USE OF INSULIN (H): ICD-10-CM

## 2021-12-20 DIAGNOSIS — E11.65 TYPE 2 DIABETES MELLITUS WITH HYPERGLYCEMIA, WITH LONG-TERM CURRENT USE OF INSULIN (H): ICD-10-CM

## 2021-12-20 DIAGNOSIS — E11.65 TYPE 2 DIABETES MELLITUS WITH HYPERGLYCEMIA, WITH LONG-TERM CURRENT USE OF INSULIN (H): Primary | ICD-10-CM

## 2021-12-20 DIAGNOSIS — Z79.4 TYPE 2 DIABETES MELLITUS WITH HYPERGLYCEMIA, WITH LONG-TERM CURRENT USE OF INSULIN (H): Primary | ICD-10-CM

## 2021-12-20 RX ORDER — DULAGLUTIDE 3 MG/.5ML
3 INJECTION, SOLUTION SUBCUTANEOUS
Qty: 2 ML | Refills: 3 | Status: SHIPPED | OUTPATIENT
Start: 2021-12-20 | End: 2022-04-20 | Stop reason: ALTCHOICE

## 2021-12-20 NOTE — TELEPHONE ENCOUNTER
Trulicity      Last Written Prescription Date:  10/14/21  Last Fill Quantity: 2ml,   # refills: 0  Last Office Visit: 08/17/21  Future Office visit:    Next 5 appointments (look out 90 days)    Dec 20, 2021  8:45 AM  (Arrive by 8:30 AM)  Nurse Only with Hc Dm Nurse  [facility] [place] ([facility] [place] ) [address] [place] [address]  [phone]   Jan 14, 2022  2:50 PM  (Arrive by 2:35 PM)  Office Visit with Amberly Whyte NP  [facility] [place] ([facility] [place] ) [address]  [place] [address]  [phone]

## 2021-12-20 NOTE — PROGRESS NOTES
Pt came in for a BG meter download today. This was completed and given to Courtney Chaudhary.    Gege Walter

## 2022-01-10 RX ORDER — TRIAMCINOLONE ACETONIDE 1 MG/G
CREAM TOPICAL
Qty: 80 G | Refills: 0 | OUTPATIENT
Start: 2022-01-10

## 2022-01-10 NOTE — TELEPHONE ENCOUNTER
Kenalog  Last Written Prescription Date: Unknown, not prescribed here  Last Fill Quantity: ? # of Refills: ?  Last Office Visit: 8/17/21

## 2022-01-14 ENCOUNTER — LAB (OUTPATIENT)
Dept: LAB | Facility: OTHER | Age: 57
End: 2022-01-14
Attending: NURSE PRACTITIONER
Payer: COMMERCIAL

## 2022-01-14 ENCOUNTER — OFFICE VISIT (OUTPATIENT)
Dept: FAMILY MEDICINE | Facility: OTHER | Age: 57
End: 2022-01-14
Attending: NURSE PRACTITIONER
Payer: COMMERCIAL

## 2022-01-14 VITALS
WEIGHT: 190.6 LBS | SYSTOLIC BLOOD PRESSURE: 112 MMHG | DIASTOLIC BLOOD PRESSURE: 76 MMHG | HEART RATE: 98 BPM | BODY MASS INDEX: 33.77 KG/M2 | OXYGEN SATURATION: 92 % | TEMPERATURE: 99.2 F | HEIGHT: 63 IN

## 2022-01-14 DIAGNOSIS — J43.9 PULMONARY EMPHYSEMA, UNSPECIFIED EMPHYSEMA TYPE (H): ICD-10-CM

## 2022-01-14 DIAGNOSIS — D58.2 ELEVATED HEMOGLOBIN (H): ICD-10-CM

## 2022-01-14 DIAGNOSIS — I10 ESSENTIAL HYPERTENSION: Primary | ICD-10-CM

## 2022-01-14 DIAGNOSIS — E11.9 TYPE 2 DIABETES, HBA1C GOAL < 7% (H): ICD-10-CM

## 2022-01-14 DIAGNOSIS — Z13.0 SCREENING, ANEMIA, DEFICIENCY, IRON: ICD-10-CM

## 2022-01-14 DIAGNOSIS — L84 CALLUS OF FOOT: ICD-10-CM

## 2022-01-14 DIAGNOSIS — N18.30 STAGE 3 CHRONIC KIDNEY DISEASE, UNSPECIFIED WHETHER STAGE 3A OR 3B CKD (H): ICD-10-CM

## 2022-01-14 DIAGNOSIS — E11.29 MICROALBUMINURIA DUE TO TYPE 2 DIABETES MELLITUS (H): ICD-10-CM

## 2022-01-14 DIAGNOSIS — Z72.0 TOBACCO ABUSE: ICD-10-CM

## 2022-01-14 DIAGNOSIS — R80.9 MICROALBUMINURIA DUE TO TYPE 2 DIABETES MELLITUS (H): ICD-10-CM

## 2022-01-14 DIAGNOSIS — E78.5 HYPERLIPIDEMIA, UNSPECIFIED HYPERLIPIDEMIA TYPE: ICD-10-CM

## 2022-01-14 LAB
ALBUMIN SERPL-MCNC: 4.1 G/DL (ref 3.4–5)
ALP SERPL-CCNC: 86 U/L (ref 40–150)
ALT SERPL W P-5'-P-CCNC: 16 U/L (ref 0–50)
ANION GAP SERPL CALCULATED.3IONS-SCNC: 4 MMOL/L (ref 3–14)
AST SERPL W P-5'-P-CCNC: 13 U/L (ref 0–45)
BASOPHILS # BLD AUTO: 0.1 10E3/UL (ref 0–0.2)
BASOPHILS NFR BLD AUTO: 1 %
BILIRUB SERPL-MCNC: 0.6 MG/DL (ref 0.2–1.3)
BUN SERPL-MCNC: 19 MG/DL (ref 7–30)
CALCIUM SERPL-MCNC: 10.1 MG/DL (ref 8.5–10.1)
CHLORIDE BLD-SCNC: 103 MMOL/L (ref 94–109)
CHOLEST SERPL-MCNC: 154 MG/DL
CO2 SERPL-SCNC: 29 MMOL/L (ref 20–32)
CREAT SERPL-MCNC: 1.23 MG/DL (ref 0.52–1.04)
EOSINOPHIL # BLD AUTO: 0.1 10E3/UL (ref 0–0.7)
EOSINOPHIL NFR BLD AUTO: 1 %
ERYTHROCYTE [DISTWIDTH] IN BLOOD BY AUTOMATED COUNT: 12.9 % (ref 10–15)
EST. AVERAGE GLUCOSE BLD GHB EST-MCNC: 200 MG/DL
FASTING STATUS PATIENT QL REPORTED: YES
GFR SERPL CREATININE-BSD FRML MDRD: 51 ML/MIN/1.73M2
GLUCOSE BLD-MCNC: 187 MG/DL (ref 70–99)
HBA1C MFR BLD: 8.6 % (ref 0–5.6)
HCT VFR BLD AUTO: 56.1 % (ref 35–47)
HDLC SERPL-MCNC: 46 MG/DL
HGB BLD-MCNC: 18.6 G/DL (ref 11.7–15.7)
IMM GRANULOCYTES # BLD: 0 10E3/UL
IMM GRANULOCYTES NFR BLD: 0 %
LDLC SERPL CALC-MCNC: 74 MG/DL
LYMPHOCYTES # BLD AUTO: 1.6 10E3/UL (ref 0.8–5.3)
LYMPHOCYTES NFR BLD AUTO: 23 %
MCH RBC QN AUTO: 30.8 PG (ref 26.5–33)
MCHC RBC AUTO-ENTMCNC: 33.2 G/DL (ref 31.5–36.5)
MCV RBC AUTO: 93 FL (ref 78–100)
MONOCYTES # BLD AUTO: 1 10E3/UL (ref 0–1.3)
MONOCYTES NFR BLD AUTO: 14 %
NEUTROPHILS # BLD AUTO: 4.3 10E3/UL (ref 1.6–8.3)
NEUTROPHILS NFR BLD AUTO: 61 %
NONHDLC SERPL-MCNC: 108 MG/DL
NRBC # BLD AUTO: 0 10E3/UL
NRBC BLD AUTO-RTO: 0 /100
PLATELET # BLD AUTO: 240 10E3/UL (ref 150–450)
POTASSIUM BLD-SCNC: 4.1 MMOL/L (ref 3.4–5.3)
PROT SERPL-MCNC: 8.2 G/DL (ref 6.8–8.8)
RBC # BLD AUTO: 6.03 10E6/UL (ref 3.8–5.2)
SODIUM SERPL-SCNC: 136 MMOL/L (ref 133–144)
TRIGL SERPL-MCNC: 168 MG/DL
WBC # BLD AUTO: 7 10E3/UL (ref 4–11)

## 2022-01-14 PROCEDURE — 99214 OFFICE O/P EST MOD 30 MIN: CPT | Performed by: NURSE PRACTITIONER

## 2022-01-14 PROCEDURE — 85004 AUTOMATED DIFF WBC COUNT: CPT | Mod: ZL

## 2022-01-14 PROCEDURE — 82040 ASSAY OF SERUM ALBUMIN: CPT | Mod: ZL

## 2022-01-14 PROCEDURE — 83718 ASSAY OF LIPOPROTEIN: CPT | Mod: ZL

## 2022-01-14 PROCEDURE — 83036 HEMOGLOBIN GLYCOSYLATED A1C: CPT | Mod: ZL

## 2022-01-14 PROCEDURE — G0463 HOSPITAL OUTPT CLINIC VISIT: HCPCS | Mod: 25

## 2022-01-14 PROCEDURE — 80053 COMPREHEN METABOLIC PANEL: CPT | Mod: ZL

## 2022-01-14 PROCEDURE — G0463 HOSPITAL OUTPT CLINIC VISIT: HCPCS

## 2022-01-14 ASSESSMENT — PAIN SCALES - GENERAL: PAINLEVEL: NO PAIN (0)

## 2022-01-14 ASSESSMENT — MIFFLIN-ST. JEOR: SCORE: 1413.69

## 2022-01-14 NOTE — PROGRESS NOTES
{PROVIDER CHARTING PREFERENCE:312215}    Mike Luke is a 56 year old who presents for the following health issues {ACCOMPANIED BY STATEMENT (Optional):429440}    HPI     Diabetes Follow-up    How often are you checking your blood sugar? One time daily  What time of day are you checking your blood sugars (select all that apply)?  Before meals  Have you had any blood sugars above 200?  No  Have you had any blood sugars below 70?  No    What symptoms do you notice when your blood sugar is low?  None    What concerns do you have today about your diabetes? None     Do you have any of these symptoms? (Select all that apply)  No numbness or tingling in feet.  No redness, sores or blisters on feet.  No complaints of excessive thirst.  No reports of blurry vision.  No significant changes to weight.      {Reference  Diabetes Management Resources :678894}    {Reference  Diabetes Log - 7 days :782285}    Hyperlipidemia Follow-Up      Are you regularly taking any medication or supplement to lower your cholesterol?   Yes- Lipitor 40 mg     Are you having muscle aches or other side effects that you think could be caused by your cholesterol lowering medication?  No    Hypertension Follow-up      Do you check your blood pressure regularly outside of the clinic? No     Are you following a low salt diet? No    Are your blood pressures ever more than 140 on the top number (systolic) OR more   than 90 on the bottom number (diastolic), for example 140/90? No    BP Readings from Last 2 Encounters:   01/14/22 112/76   10/14/21 115/80     Hemoglobin A1C POCT (%)   Date Value   05/10/2021 >14.0 (H)   02/10/2021 10.7 (H)     Hemoglobin A1C (%)   Date Value   01/14/2022 8.6 (H)   08/12/2021 9.8 (H)     LDL Cholesterol Calculated (mg/dL)   Date Value   01/14/2022 74   11/09/2020 74   02/04/2020 49         How many servings of fruits and vegetables do you eat daily?  2-3    On average, how many sweetened beverages do you drink each  "day (Examples: soda, juice, sweet tea, etc.  Do NOT count diet or artificially sweetened beverages)?   1    How many days per week do you exercise enough to make your heart beat faster? 3 or less    How many minutes a day do you exercise enough to make your heart beat faster? 9 or less    How many days per week do you miss taking your medication? 0    Diabetic Foot Screen:  Any complaints of increased pain or numbness ? { :196909::\"No\"}  Is there a foot ulcer now or a history of foot ulcer? { :649018::\"No\"}  Does the foot have an abnormal shape? { :255889::\"No\"}  Are the nails thick, too long or ingrown? { :438939::\"No\"}  Are there any redness or open areas? { :287367::\"No\"}         Sensation Testing done at all points on the diagram with monofilament     Right Foot: Sensation {Normal/Absent at points:557826::\"Normal at all points\"}  Left Foot: Sensation {Normal/Absent at points:069298::\"Normal at all points\"}     Risk Category: {SMIfootexam:310210::\"0- No loss of protective sensation\"}  Performed by Karolina Camacho LPN                {additonal problems for provider to add (Optional):877759}    Review of Systems   {ROS COMP (Optional):389162}      Objective    /76 (BP Location: Left arm, Patient Position: Chair, Cuff Size: Adult Large)   Pulse 105   Temp 99.2  F (37.3  C) (Tympanic)   Ht 1.6 m (5' 3\")   Wt 86.5 kg (190 lb 9.6 oz)   SpO2 (!) 88%   BMI 33.76 kg/m    Body mass index is 33.76 kg/m .  Physical Exam   {Exam List (Optional):057665}    {Diagnostic Test Results (Optional):603625}    {AMBULATORY ATTESTATION (Optional):366496}                    Lung Cancer Screening Shared Decision Making Visit     Marly Cannon is eligible for lung cancer screening on the basis of the information provided in my signed lung cancer screening order.     I have discussed with patient the risks and benefits of screening for lung cancer with low-dose CT.     The risks include:  radiation exposure: one low dose chest " CT has as much ionizing radiation as about 15 chest x-rays or 6 months of background radiation living in [place]    false positives: 96% of positive findings/nodules are NOT cancer, but some might still require additional diagnostic evaluation, including biopsy  over-diagnosis: some slow growing cancers that might never have been clinically significant will be detected and treated unnecessarily     The benefit of early detection of lung cancer is contingent upon adherence to annual screening or more frequent follow up if indicated.     Furthermore, reaping the benefits of screening requires Marly CARCAMO Cannon to be willing and physically able to undergo diagnostic procedures, if indicated. Although no specific guide is available for determining severity of comorbidities, it is reasonable to withhold screening in patients who have greater mortality risk from other diseases.     We did discuss that the only way to prevent lung cancer is to not smoke. Smoking cessation counseling { :385184}      I {DID/NOT:623762} offer risk estimation using a calculator such as this one:    ShouldIScreen    {TIP  The Should I Screen web page is not IE compatible. Please copy and paste link into a different browser if needed (Chrome or Edge) :364030}

## 2022-01-14 NOTE — PATIENT INSTRUCTIONS

## 2022-01-18 DIAGNOSIS — L30.9 DERMATITIS: Primary | ICD-10-CM

## 2022-01-19 RX ORDER — TRIAMCINOLONE ACETONIDE 1 MG/G
CREAM TOPICAL
Qty: 80 G | Refills: 0 | Status: SHIPPED | OUTPATIENT
Start: 2022-01-19 | End: 2022-11-01

## 2022-01-19 NOTE — TELEPHONE ENCOUNTER
triamcinolone (KENALOG) 0.1 % cream        Last Written Prescription Date:  Historical unknown   Last Fill Quantity: ,   # refills:   Last Office Visit: 1/14/22  Future Office visit:    Next 5 appointments (look out 90 days)    Apr 15, 2022 10:20 AM  (Arrive by 10:05 AM)  SHORT with Amberly Whyte NP  [facility] ([facility] [place] ) [address]  [place] [address]  [phone]           Routing refill request to provider for review/approval because:  Medication is reported/historical

## 2022-01-27 DIAGNOSIS — Z53.9 PERSONS ENCOUNTERING HEALTH SERVICES FOR SPECIFIC PROCEDURES, NOT CARRIED OUT: Primary | ICD-10-CM

## 2022-02-07 DIAGNOSIS — E11.9 TYPE 2 DIABETES, HBA1C GOAL < 7% (H): ICD-10-CM

## 2022-02-08 RX ORDER — EMPAGLIFLOZIN 25 MG/1
TABLET, FILM COATED ORAL
Qty: 30 TABLET | Refills: 0 | Status: SHIPPED | OUTPATIENT
Start: 2022-02-08 | End: 2022-03-10

## 2022-02-14 ENCOUNTER — TELEPHONE (OUTPATIENT)
Dept: FAMILY MEDICINE | Facility: OTHER | Age: 57
End: 2022-02-14
Payer: COMMERCIAL

## 2022-02-14 DIAGNOSIS — E11.9 TYPE 2 DIABETES, HBA1C GOAL < 7% (H): Primary | ICD-10-CM

## 2022-02-15 DIAGNOSIS — K59.00 CONSTIPATION, UNSPECIFIED CONSTIPATION TYPE: ICD-10-CM

## 2022-02-15 RX ORDER — DOCUSATE SODIUM 100 MG/1
CAPSULE, LIQUID FILLED ORAL
Qty: 90 CAPSULE | Refills: 0 | Status: SHIPPED | OUTPATIENT
Start: 2022-02-15 | End: 2022-05-10

## 2022-02-15 NOTE — TELEPHONE ENCOUNTER
SONI      Last Written Prescription Date:  10/21/2021  Last Fill Quantity: 90,   # refills: 0  Last Office Visit: 1/14/2022  Future Office visit:    Next 5 appointments (look out 90 days)    Apr 15, 2022 10:20 AM  (Arrive by 10:05 AM)  SHORT with Amberly Whyte NP  [facility] [place] ([facility] [place] ) [address]  [place] [address]  [phone]           Routing refill request to provider for review/approval because:

## 2022-02-24 ENCOUNTER — ALLIED HEALTH/NURSE VISIT (OUTPATIENT)
Dept: EDUCATION SERVICES | Facility: OTHER | Age: 57
End: 2022-02-24
Attending: NURSE PRACTITIONER
Payer: COMMERCIAL

## 2022-02-24 VITALS
OXYGEN SATURATION: 91 % | BODY MASS INDEX: 33.31 KG/M2 | RESPIRATION RATE: 16 BRPM | SYSTOLIC BLOOD PRESSURE: 104 MMHG | HEIGHT: 64 IN | WEIGHT: 195.1 LBS | DIASTOLIC BLOOD PRESSURE: 72 MMHG | HEART RATE: 89 BPM

## 2022-02-24 DIAGNOSIS — E11.65 TYPE 2 DIABETES MELLITUS WITH HYPERGLYCEMIA, WITH LONG-TERM CURRENT USE OF INSULIN (H): ICD-10-CM

## 2022-02-24 DIAGNOSIS — F17.200 NICOTINE DEPENDENCE, UNCOMPLICATED, UNSPECIFIED NICOTINE PRODUCT TYPE: Primary | ICD-10-CM

## 2022-02-24 DIAGNOSIS — Z79.4 TYPE 2 DIABETES MELLITUS WITH HYPERGLYCEMIA, WITH LONG-TERM CURRENT USE OF INSULIN (H): ICD-10-CM

## 2022-02-24 PROCEDURE — 99213 OFFICE O/P EST LOW 20 MIN: CPT | Performed by: NURSE PRACTITIONER

## 2022-02-24 PROCEDURE — G0463 HOSPITAL OUTPT CLINIC VISIT: HCPCS

## 2022-02-24 ASSESSMENT — PAIN SCALES - GENERAL: PAINLEVEL: NO PAIN (0)

## 2022-02-24 NOTE — PATIENT INSTRUCTIONS
Continue working on healthy eating and moving (start low and slow, work up to 30 min, 5x/week)    BG goals:  Fasting and before meals <130, >70  2 hour after eating <180    We only need 1/2 of these numbers to be within target then your A1c will be within target    Medication changes   Pat from [employer] will be calling you     Follow up   1 month    Call me sooner if any problems/concerns and/or questions develop including consistent low BGs <70 or consistent high BGs >200  [phone] (Unit Coordinator)    [phone] (Anna, Nurse)    Smoking:   Try keep working on reducing the amount of cigarettes

## 2022-02-24 NOTE — PROGRESS NOTES
SUBJECTIVE:  Marly Cannon, 56 year old, female presents with the following Chief Complaint(s) with HPI to follow:  Chief Complaint   Patient presents with     Diabetes        Diabetes Follow-up    Patient is checking blood sugars: 0-1x/day.  Results:  BGs 205-342       Symptoms of hypoglycemia (low blood sugar): no    Paresthesias (numbness or burning in feet) or sores: yes, no sores    Diabetic eye exam within the last year: UTD    Breakfast eaten regularly: most of the time    Patient counting carbs: Yes    Exercising: depends on the weather          HPI:  Marly's here today for the follow up regarding her Diabetes mellitus, Type 2.    Lab Results   Component Value Date    A1C 8.6 01/14/2022    A1C 9.8 08/12/2021    A1C >14.0 05/10/2021    A1C 10.7 02/10/2021    A1C 7.6 11/09/2020    A1C 7.9 08/07/2020    A1C 8.1 05/04/2020     Current Diabetes medication:   1. Vgo40 --Novolog, unsure on how many clicks   2. Trulicity 3 mg weekly (Tuesday, morning)   3.  Jardiance 25 mg daily  Statin use: yes, simvastatin 20 mg daily  ASA: yes, 81 mg daily    Marly's here for a meter download and possible diabetic medication adjustments.   Reports the following:  KHADIJAH Brunner continues to set up her medication weekly (Monday or Tuesday)    Issues with the Vgo40 causing some discomfort when it's on the right side of her stomach.    Not sure how many clicks she's doing or how often she's changing it.      Denies any new health concerns.      Weight trend   Wt Readings from Last 4 Encounters:   02/24/22 88.5 kg (195 lb 1.6 oz)   01/14/22 86.5 kg (190 lb 9.6 oz)   10/14/21 88.9 kg (196 lb)   09/09/21 85.9 kg (189 lb 4.8 oz)         Patient Active Problem List   Diagnosis     Type 2 diabetes, HbA1c goal < 7% (H)     ACP (advance care planning)     Stage 3 chronic kidney disease (H)     Pulmonary emphysema (H)     Hyperparathyroidism due to renal insufficiency (H)     Morbid obesity (H)     Vitamin D deficiency     Intellectual  disability     Breast fibrocystic disorder     Tobacco abuse     Microalbuminuria due to type 2 diabetes mellitus (H)     H/O colonoscopy     [place] cardiac risk <10% in next 10 years     Tubular adenoma of colon     Hyperlipidemia, unspecified hyperlipidemia type     Essential hypertension     Callus of foot     Memory disturbance     ADEEL (obstructive sleep apnea)       History reviewed. No pertinent past medical history.    Past Surgical History:   Procedure Laterality Date     COLONOSCOPY N/A 8/20/2015    Procedure: COLONOSCOPY;  Surgeon: Gentry Porter MD;  Location: [facility]     COLONOSCOPY N/A 9/21/2018    Procedure: COLONOSCOPY;  COLONOSCOPY WITH POLYPECTOMY;  Surgeon: Gentry Porter MD;  Location: [facility]       Family History   Problem Relation Age of Onset     Diabetes Mother      Diabetes Father      Hypertension Brother      Diabetes Sister        Social History     Tobacco Use     Smoking status: Current Every Day Smoker     Packs/day: 1.00     Years: 34.00     Pack years: 34.00     Types: Cigarettes     Start date: 1/1/1979     Smokeless tobacco: Never Used     Tobacco comment: Declined QP 05/13/2020   Substance Use Topics     Alcohol use: No     Alcohol/week: 0.0 standard drinks       Current Outpatient Medications   Medication Sig Dispense Refill     Acetaminophen (TYLENOL PO) Take 325 mg by mouth every 6 hours as needed        amLODIPine (NORVASC) 5 MG tablet TAKE 1 TABLET BY MOUTH DAILY 90 tablet 0     ammonium lactate (LAC-HYDRIN) 12 % cream Apply topically 2 times daily       aspirin (ASPIRIN LOW DOSE) 81 MG chewable tablet CHEW 1 TABLET BY MOUTH DAILY. DO NOT SPLIT OR CRUSH 90 tablet 0     atorvastatin (LIPITOR) 40 MG tablet TAKE 1 TABLET BY MOUTH AT BEDTIME 90 tablet 2     blood glucose (ONETOUCH ULTRA) test strip Use to test blood sugar 4 times daily 150 strip 3     budesonide-formoterol (SYMBICORT) 160-4.5 MCG/ACT Inhaler Inhale 2 puffs into the lungs 2 times daily 1 Inhaler 3      Cholecalciferol (VITAMIN D3) 50 MCG (2000 UT) CAPS TAKE 1 CAPSULE BY MOUTH DAILY 90 capsule 3     COMBIVENT RESPIMAT  MCG/ACT inhaler INHALE 1 PUFF INTO THE LUNGS 4 TIMES DAILY 4 g 2     docusate sodium (COLACE) 100 MG capsule TAKE 1 CAPSULE BY MOUTH DAILY 90 capsule 0     Dulaglutide (TRULICITY) 3 MG/0.5ML SOPN Inject 3 mg Subcutaneous every 7 days 2 mL 3     hydrocortisone (CORTAID) 1 % external cream Apply topically 2 times daily 453.6 g 0     hydrocortisone 2.5 % cream Apply topically 2 times daily       insulin aspart (NOVOLOG VIAL) 100 UNITS/ML vial To be used with the V40.  TDD: 76 units 40 mL 3     insulin pen needle (32G X 4 MM) 32G X 4 MM miscellaneous Use 1 pen needles daily 100 each 3     JARDIANCE 25 MG TABS tablet TAKE 1 TABLET BY MOUTH DAILY 30 tablet 0     lisinopril (ZESTRIL) 40 MG tablet Take 1 tablet (40 mg) by mouth daily 90 tablet 3     Magnesium Cl-Calcium Carbonate 71.5-119 MG TBEC Take 2 tablets by mouth       nystatin (MYCOSTATIN) 398251 UNIT/GM external powder Apply topically 3 times daily 60 g 3     ONETOUCH DELICA LANCETS 33G MISC 1 each 3 times daily (Patient taking differently: 1 each 4 times daily ) 100 each 10     order for DME Women briefs 50 each 1     polyethylene glycol (MIRALAX) 17 g packet Take 17 g by mouth 3 times daily       primidone (MYSOLINE) 50 MG tablet TAKE 1 TABLET BY MOUTH AT BEDTIME 30 tablet 1     tiZANidine (ZANAFLEX) 4 MG tablet Take 1 tablet (4 mg) by mouth 3 times daily as needed for muscle spasms 30 tablet 0     triamcinolone (KENALOG) 0.1 % external cream USE AS DIRECTED 80 g 0     urea (GORDONS UREA) 40 % external ointment Apply topically 2 times daily 30 g 0     wearable insulin delivery device (V-GO 40) kit Insulin delivery device to be changed every 24 hours 30 each 4       No Known Allergies    REVIEW OF SYSTEMS  Skin: negative  Eyes: negative, wears glasses    Ears/Nose/Throat: negative  Respiratory: No shortness of breath or hemoptysis; smoker's  "cough; hx of sleep apnea  Cardiovascular: negative  Gastrointestinal: negative  Genitourinary: negative  Musculoskeletal: negative; hx of left shoulder pain and left knee   Neurologic: negative  Psychiatric: negative  Hematologic/Lymphatic/Immunologic: negative  Endocrine: positive for diabetes     OBJECTIVE:  /72   Pulse 89   Resp 16   Ht 1.626 m (5' 4\")   Wt 88.5 kg (195 lb 1.6 oz)   SpO2 91%   BMI 33.49 kg/m    Constitutional: alert and no distress  Respiratory:  Good diaphragmatic excursion.   Musculoskeletal: extremities normal- no gross deformities noted and gait normal  Skin: right side of stomach: scar tissue development; otherwise, no suspicious lesions or rashes to visible skin  Psychiatric: mentation appears normal and affect normal/bright      LABS  No results found for any visits on 02/24/22.    ASSESSMENT / PLAN:  (E11.65,  Z79.4) Type 2 diabetes mellitus with hyperglycemia, with long-term current use of insulin (H)  Comment: noted A1c  Not sure if the Vgo is working for her--how often she's changing it or how many clicks she's doing for meals/snacks    Plan:   First, rotate your VGo from the current left and right side of the stomach.      Discussed new technology available     She's interested in the Omnipod Dash.   Message sent to Pat (the [employer] rep) per patient's request    (F16.871) Nicotine dependence, uncomplicated, unspecified nicotine product type  (primary encounter diagnosis)  Comment: noted and working on it.    Plan:   Spoke with patient regarding options for smoking cessation.  Various pharmacologic options and behavioral techniques were reviewed.  The relative effectiveness as well as risks and benefits were reviewed with patient.  Patient is interested in: No interventions    Smoking Cessation    Marly's aware to let us know when she's ready to quit smoking.  Discussed the dangers of smoking with diabetes    Follow up  1 month    Call sooner if any issues    Patient " Instructions   Continue working on healthy eating and moving (start low and slow, work up to 30 min, 5x/week)    BG goals:  Fasting and before meals <130, >70  2 hour after eating <180    We only need 1/2 of these numbers to be within target then your A1c will be within target    Medication changes   Pat from Omnipod will be calling you     Follow up   1 month    Call me sooner if any problems/concerns and/or questions develop including consistent low BGs <70 or consistent high BGs >200  [phone] (Unit Coordinator)    [phone] (Anna, Nurse)    Smoking:   Try keep working on reducing the amount of cigarettes           Time: 25 minutes  Barrier: see PMH  Willingness to learn: accepting    Courtney PINTO [facility]  Diabetes and Wound Care    Cc: Amberly Whyte NP    25 minutes was spent with patient.    All of this time was spent on counseling patient regarding illness, medication and/or treatment options, coordinating further cares and follow ups that are needed along with resource material that will be helpful in the treatment of these issues.       With the electronic record, we can now more quickly and easily track our patient diabetic goals. Our diabetes clinical review is in progress and these are the indicators we are monitoring for good diabetes health.     1.) HbA1C less than 7 (measurement of your average blood sugars)  2.) Blood Pressure less than 140/80  3.) LDL less than 100 (bad cholesterol)  4.) HbA1C is checked in the last 6 months and below 7% (more frequently if not at goal or adjusting medications)  5.) LDL is checked in the last 12 months (more frequently if not at goal or adjusting medications)  6.) Taking one baby aspirin daily (unless otherwise instructed)  7.) No tobacco use  8) Statin use     You have achieved 6 out of 8 of these and I am encouraging you to come in and get tuned up to achieve 8 out of 8!  Here is what you have achieved so far in my goals for you:  1.) HbA1C  less than 7:                               NO  Your last  HbA1C :  Lab Results   Component Value Date    A1C 8.6 01/14/2022    A1C 9.8 08/12/2021    A1C >14.0 05/10/2021    A1C 10.7 02/10/2021    A1C 7.6 11/09/2020    A1C 7.9 08/07/2020    A1C 8.1 05/04/2020     2.) Blood Pressure less than 140/80:       YES      Your last    BP Readings from Last 1 Encounters:   02/24/22 104/72     3.) LDL less than 100:                              YES      Your last     LDL Cholesterol Calculated   Date Value Ref Range Status   01/14/2022 74 <=100 mg/dL Final   11/09/2020 74 <100 mg/dL Final     Comment:     Desirable:       <100 mg/dl       4.) Checked HbA1C in the past 6 months: YES      5.) Checked LDL in the past 12 months:    YES      6.) Taking one aspirin daily:                       YES     7.) No tobacco use:                                        NO   8.) Statin use      YES

## 2022-02-24 NOTE — PROGRESS NOTES
"Chief Complaint   Patient presents with     Diabetes       Initial Resp 16   Wt 88.5 kg (195 lb 1.6 oz)   BMI 34.56 kg/m   Estimated body mass index is 34.56 kg/m  as calculated from the following:    Height as of 1/14/22: 1.6 m (5' 3\").    Weight as of this encounter: 88.5 kg (195 lb 1.6 oz).  Medication Reconciliation: complete  Gege Walter LPN    "

## 2022-03-01 DIAGNOSIS — I10 ESSENTIAL HYPERTENSION: ICD-10-CM

## 2022-03-01 RX ORDER — ASPIRIN 81 MG/1
TABLET, CHEWABLE ORAL
Qty: 90 TABLET | Refills: 0 | Status: SHIPPED | OUTPATIENT
Start: 2022-03-01 | End: 2022-05-27

## 2022-03-01 RX ORDER — AMLODIPINE BESYLATE 5 MG/1
TABLET ORAL
Qty: 90 TABLET | Refills: 0 | Status: SHIPPED | OUTPATIENT
Start: 2022-03-01 | End: 2022-05-27

## 2022-03-01 RX ORDER — LISINOPRIL 40 MG/1
TABLET ORAL
Qty: 90 TABLET | Refills: 0 | Status: SHIPPED | OUTPATIENT
Start: 2022-03-01 | End: 2022-05-27

## 2022-03-01 NOTE — TELEPHONE ENCOUNTER
Aspirin 81 mg    Last Written Prescription Date:  12-2-2021  Last Fill Quantity: 90,   # refills: 0  Last Office Visit: 1-4-2022  Future Office visit:    Next 5 appointments (look out 90 days)    Apr 15, 2022 10:20 AM  (Arrive by 10:05 AM)  SHORT with Amberly Whyte NP  [facility] [place] ([facility] [place] ) [address]  [place] [address]  [phone]           Lisinopril 40 mg       Last Written Prescription Date:  2-  Last Fill Quantity: 90,   # refills: 3  Last Office Visit: 1-4-2022  Future Office visit:    Next 5 appointments (look out 90 days)    Apr 15, 2022 10:20 AM  (Arrive by 10:05 AM)  SHORT with Amberly Whyte NP  [facility] [place] ([facility] [place] ) [address]  [place] [address]  [phone]             Norvasc  5 mg   Last Written Prescription Date:  12-2-2021  Last Fill Quantity: 90,   # refills: 0  Last Office Visit: 1-4-2022  Future Office visit:    Next 5 appointments (look out 90 days)    Apr 15, 2022 10:20 AM  (Arrive by 10:05 AM)  [facility] [place] ([facility] [place] ) [address]  [place] [address]  [phone]

## 2022-03-09 DIAGNOSIS — E11.9 TYPE 2 DIABETES, HBA1C GOAL < 7% (H): ICD-10-CM

## 2022-03-10 RX ORDER — EMPAGLIFLOZIN 25 MG/1
TABLET, FILM COATED ORAL
Qty: 30 TABLET | Refills: 3 | Status: SHIPPED | OUTPATIENT
Start: 2022-03-10 | End: 2022-04-20

## 2022-03-14 DIAGNOSIS — Z79.4 TYPE 2 DIABETES MELLITUS WITH HYPERGLYCEMIA, WITH LONG-TERM CURRENT USE OF INSULIN (H): Primary | ICD-10-CM

## 2022-03-14 DIAGNOSIS — E11.65 TYPE 2 DIABETES MELLITUS WITH HYPERGLYCEMIA, WITH LONG-TERM CURRENT USE OF INSULIN (H): Primary | ICD-10-CM

## 2022-03-14 RX ORDER — INSULIN PUMP CONTROLLER
1 EACH MISCELLANEOUS
Qty: 15 EACH | Refills: 5 | Status: SHIPPED | OUTPATIENT
Start: 2022-03-14 | End: 2022-03-24

## 2022-03-14 NOTE — PROGRESS NOTES
Omnipod dash pod sent to [facility]    Courtney PINTO FNP-BC  Diabetes and Wound Care

## 2022-03-21 ENCOUNTER — TELEPHONE (OUTPATIENT)
Dept: EDUCATION SERVICES | Facility: OTHER | Age: 57
End: 2022-03-21
Payer: COMMERCIAL

## 2022-03-22 ENCOUNTER — TELEPHONE (OUTPATIENT)
Dept: EDUCATION SERVICES | Facility: OTHER | Age: 57
End: 2022-03-22
Payer: COMMERCIAL

## 2022-03-22 NOTE — TELEPHONE ENCOUNTER
Nahum from Omn[employer]od called.   Inquiring if you have received fax for prescription faxed on the 3/09/22.    Call back number: [phone]

## 2022-03-23 ENCOUNTER — MEDICAL CORRESPONDENCE (OUTPATIENT)
Dept: HEALTH INFORMATION MANAGEMENT | Facility: CLINIC | Age: 57
End: 2022-03-23

## 2022-03-24 ENCOUNTER — ALLIED HEALTH/NURSE VISIT (OUTPATIENT)
Dept: EDUCATION SERVICES | Facility: OTHER | Age: 57
End: 2022-03-24
Attending: NURSE PRACTITIONER
Payer: COMMERCIAL

## 2022-03-24 VITALS
SYSTOLIC BLOOD PRESSURE: 116 MMHG | OXYGEN SATURATION: 92 % | DIASTOLIC BLOOD PRESSURE: 64 MMHG | HEART RATE: 94 BPM | HEIGHT: 64 IN | BODY MASS INDEX: 32.78 KG/M2 | WEIGHT: 192 LBS

## 2022-03-24 DIAGNOSIS — Z79.4 TYPE 2 DIABETES MELLITUS WITH HYPERGLYCEMIA, WITH LONG-TERM CURRENT USE OF INSULIN (H): ICD-10-CM

## 2022-03-24 DIAGNOSIS — E11.65 TYPE 2 DIABETES MELLITUS WITH HYPERGLYCEMIA, WITH LONG-TERM CURRENT USE OF INSULIN (H): ICD-10-CM

## 2022-03-24 DIAGNOSIS — F17.200 NICOTINE DEPENDENCE, UNCOMPLICATED, UNSPECIFIED NICOTINE PRODUCT TYPE: Primary | ICD-10-CM

## 2022-03-24 PROCEDURE — G0463 HOSPITAL OUTPT CLINIC VISIT: HCPCS

## 2022-03-24 PROCEDURE — 99212 OFFICE O/P EST SF 10 MIN: CPT | Performed by: NURSE PRACTITIONER

## 2022-03-24 RX ORDER — INSULIN PUMP CONTROLLER
1 EACH MISCELLANEOUS
Qty: 15 EACH | Refills: 5 | Status: SHIPPED | OUTPATIENT
Start: 2022-03-24 | End: 2022-09-28

## 2022-03-24 ASSESSMENT — PAIN SCALES - GENERAL: PAINLEVEL: NO PAIN (0)

## 2022-03-24 NOTE — PATIENT INSTRUCTIONS
Continue working on healthy eating and moving (start low and slow, work up to 30 min, 5x/week)    BG goals:  Fasting and before meals <130, >70  2 hour after eating <180    We only need 1/2 of these numbers to be within target then your A1c will be within target    Medication changes   Keep working on entering bolus with every meal.     Follow up   Omnipod training    Call me sooner if any problems/concerns and/or questions develop including consistent low BGs <70 or consistent high BGs >200  [phone] (Unit Coordinator)    [phone] (Anna, Nurse)    Smoking:   Try keep working on reducing the amount of cigarettes

## 2022-03-24 NOTE — PROGRESS NOTES
SUBJECTIVE:  Marly Cannon, 56 year old, female presents with the following Chief Complaint(s) with HPI to follow:  Chief Complaint   Patient presents with     Diabetes     Blood glucose review        Diabetes Follow-up    Patient is checking blood sugars: 0-1x/day.  Results:  BGs 205-323       Symptoms of hypoglycemia (low blood sugar): no    Paresthesias (numbness or burning in feet) or sores: no, no sores    Diabetic eye exam within the last year: UTD--(due in April)    Breakfast eaten regularly: most of the time    Patient counting carbs: Yes    Exercising: depends on the weather          HPI:  Marly's here today for the follow up regarding her Diabetes mellitus, Type 2.    Lab Results   Component Value Date    A1C 8.6 01/14/2022    A1C 9.8 08/12/2021    A1C >14.0 05/10/2021    A1C 10.7 02/10/2021    A1C 7.6 11/09/2020    A1C 7.9 08/07/2020    A1C 8.1 05/04/2020     Current Diabetes medication:   1. Vgo40 --Novolog, unsure on how many clicks   2. Trulicity 3 mg weekly (Tuesday, morning)   3.  Jardiance 25 mg daily  Statin use: yes, simvastatin 20 mg daily  ASA: yes, 81 mg daily    Marly's here for a meter download and possible diabetic medication adjustments.   Reports the following:  No issues with the Vgo40  Changing it every other day.    Again, not sure how many clicks she's entering with meals.      Denies any new health concerns.     Asking if her Omnipod dash pods can be sent to her local pharmacy.   States theres a cost of $4 if she uses the pharmacy in the [facility].      Weight trend   Wt Readings from Last 4 Encounters:   03/24/22 87.1 kg (192 lb)   02/24/22 88.5 kg (195 lb 1.6 oz)   01/14/22 86.5 kg (190 lb 9.6 oz)   10/14/21 88.9 kg (196 lb)         Patient Active Problem List   Diagnosis     Type 2 diabetes, HbA1c goal < 7% (H)     ACP (advance care planning)     Stage 3 chronic kidney disease (H)     Pulmonary emphysema (H)     Hyperparathyroidism due to renal insufficiency (H)     Morbid  obesity (H)     Vitamin D deficiency     Intellectual disability     Breast fibrocystic disorder     Tobacco abuse     Microalbuminuria due to type 2 diabetes mellitus (H)     H/O colonoscopy     [place] cardiac risk <10% in next 10 years     Tubular adenoma of colon     Hyperlipidemia, unspecified hyperlipidemia type     Essential hypertension     Callus of foot     Memory disturbance     ADEEL (obstructive sleep apnea)       No past medical history on file.    Past Surgical History:   Procedure Laterality Date     COLONOSCOPY N/A 8/20/2015    Procedure: COLONOSCOPY;  Surgeon: Gentry Porter MD;  Location: [facility]     COLONOSCOPY N/A 9/21/2018    Procedure: COLONOSCOPY;  COLONOSCOPY WITH POLYPECTOMY;  Surgeon: Gentry Porter MD;  Location: [facility]       Family History   Problem Relation Age of Onset     Diabetes Mother      Diabetes Father      Hypertension Brother      Diabetes Sister        Social History     Tobacco Use     Smoking status: Current Every Day Smoker     Packs/day: 1.00     Years: 34.00     Pack years: 34.00     Types: Cigarettes     Start date: 1/1/1979     Smokeless tobacco: Never Used     Tobacco comment: Declined QP 05/13/2020   Substance Use Topics     Alcohol use: No     Alcohol/week: 0.0 standard drinks       Current Outpatient Medications   Medication Sig Dispense Refill     Acetaminophen (TYLENOL PO) Take 325 mg by mouth every 6 hours as needed        amLODIPine (NORVASC) 5 MG tablet TAKE 1 TABLET BY MOUTH DAILY 90 tablet 0     ammonium lactate (LAC-HYDRIN) 12 % cream Apply topically 2 times daily       aspirin (ASPIRIN LOW DOSE) 81 MG chewable tablet CHEW 1 TABLET BY MOUTH DAILY. DO NOT SPLIT OR CRUSH 90 tablet 0     atorvastatin (LIPITOR) 40 MG tablet TAKE 1 TABLET BY MOUTH AT BEDTIME 90 tablet 2     blood glucose (ONETOUCH ULTRA) test strip Use to test blood sugar 4 times daily 150 strip 3     budesonide-formoterol (SYMBICORT) 160-4.5 MCG/ACT Inhaler Inhale 2 puffs into the  lungs 2 times daily 1 Inhaler 3     Cholecalciferol (VITAMIN D3) 50 MCG (2000 UT) CAPS TAKE 1 CAPSULE BY MOUTH DAILY 90 capsule 3     COMBIVENT RESPIMAT  MCG/ACT inhaler INHALE 1 PUFF INTO THE LUNGS 4 TIMES DAILY 4 g 2     docusate sodium (COLACE) 100 MG capsule TAKE 1 CAPSULE BY MOUTH DAILY 90 capsule 0     Dulaglutide (TRULICITY) 3 MG/0.5ML SOPN Inject 3 mg Subcutaneous every 7 days 2 mL 3     hydrocortisone (CORTAID) 1 % external cream Apply topically 2 times daily 453.6 g 0     hydrocortisone 2.5 % cream Apply topically 2 times daily       insulin aspart (NOVOLOG VIAL) 100 UNITS/ML vial To be used with the V40.  TDD: 76 units 40 mL 3     Insulin Disposable Pump (OMNIPOD DASH 5 PACK PODS) MISC 1 pod every 48 hours 15 each 5     insulin pen needle (32G X 4 MM) 32G X 4 MM miscellaneous Use 1 pen needles daily 100 each 3     JARDIANCE 25 MG TABS tablet TAKE 1 TABLET BY MOUTH DAILY 30 tablet 3     lisinopril (ZESTRIL) 40 MG tablet TAKE 1 TABLET BY MOUTH DAILY 90 tablet 0     Magnesium Cl-Calcium Carbonate 71.5-119 MG TBEC Take 2 tablets by mouth       nystatin (MYCOSTATIN) 785118 UNIT/GM external powder Apply topically 3 times daily 60 g 3     ONETOUCH DELICA LANCETS 33G MISC 1 each 3 times daily (Patient taking differently: 1 each 4 times daily ) 100 each 10     order for DME Women briefs 50 each 1     polyethylene glycol (MIRALAX) 17 g packet Take 17 g by mouth 3 times daily       primidone (MYSOLINE) 50 MG tablet TAKE 1 TABLET BY MOUTH AT BEDTIME 30 tablet 1     tiZANidine (ZANAFLEX) 4 MG tablet Take 1 tablet (4 mg) by mouth 3 times daily as needed for muscle spasms 30 tablet 0     triamcinolone (KENALOG) 0.1 % external cream USE AS DIRECTED 80 g 0     urea (GORDONS UREA) 40 % external ointment Apply topically 2 times daily 30 g 0     wearable insulin delivery device (V-GO 40) kit Insulin delivery device to be changed every 24 hours 30 each 4       No Known Allergies    REVIEW OF SYSTEMS  Skin:  "negative  Eyes: negative, wears glasses    Ears/Nose/Throat: negative  Respiratory: No shortness of breath or hemoptysis; smoker's cough; hx of sleep apnea  Cardiovascular: negative  Gastrointestinal: negative  Genitourinary: negative  Musculoskeletal: negative; hx of left shoulder pain and left knee   Neurologic: negative  Psychiatric: negative  Hematologic/Lymphatic/Immunologic: negative  Endocrine: positive for diabetes     OBJECTIVE:  /64 (BP Location: Left arm, Patient Position: Chair, Cuff Size: Adult Large)   Pulse 94   Ht 1.626 m (5' 4\")   Wt 87.1 kg (192 lb)   SpO2 92%   BMI 32.96 kg/m    Constitutional: alert and no distress  Respiratory:  Good diaphragmatic excursion.   Musculoskeletal: extremities normal- no gross deformities noted and gait normal  Skin: no suspicious lesions or rashes to visible skin  Psychiatric: mentation appears normal and affect normal/bright      LABS  No results found for any visits on 03/24/22.    ASSESSMENT / PLAN:  (F17.200) Nicotine dependence, uncomplicated, unspecified nicotine product type  (primary encounter diagnosis)  Comment: noted  Plan:   Spoke with patient regarding options for smoking cessation.  Various pharmacologic options and behavioral techniques were reviewed.  The relative effectiveness as well as risks and benefits were reviewed with patient.  Patient is interested in: No interventions    Smoking Cessation    Marly's aware to let us know when she's ready to quit smoking.  Discussed the dangers of smoking with diabetes      (E11.65,  Z79.4) Type 2 diabetes mellitus with hyperglycemia, with long-term current use of insulin (H)  Comment: noted BGs  Plan: Insulin Disposable Pump (OMNIPOD DASH 5 PACK         PODS) MISC          Permission to send a message sent to [facility] (the Omnipod rep) per patient's request to see if we can send her Omnipods to [facility].     Keep working on entering bolus before meals.    Try changing the vgo every day    Follow " up  Omnipod training    Call sooner if any issues    Patient Instructions   Continue working on healthy eating and moving (start low and slow, work up to 30 min, 5x/week)    BG goals:  Fasting and before meals <130, >70  2 hour after eating <180    We only need 1/2 of these numbers to be within target then your A1c will be within target    Medication changes   Keep working on entering bolus with every meal.     Follow up   Omnipod training    Call me sooner if any problems/concerns and/or questions develop including consistent low BGs <70 or consistent high BGs >200  [phone] (Unit Coordinator)    [phone] (Anna, Nurse)    Smoking:   Try keep working on reducing the amount of cigarettes           Time: 20 minutes  Barrier: see [facility]  Willingness to learn: accepting    Courtney PINTO [facility]  Diabetes and Wound Care    Cc: Amberly Whyte NP        With the electronic record, we can now more quickly and easily track our patient diabetic goals. Our diabetes clinical review is in progress and these are the indicators we are monitoring for good diabetes health.     1.) HbA1C less than 7 (measurement of your average blood sugars)  2.) Blood Pressure less than 140/80  3.) LDL less than 100 (bad cholesterol)  4.) HbA1C is checked in the last 6 months and below 7% (more frequently if not at goal or adjusting medications)  5.) LDL is checked in the last 12 months (more frequently if not at goal or adjusting medications)  6.) Taking one baby aspirin daily (unless otherwise instructed)  7.) No tobacco use  8) Statin use     You have achieved 6 out of 8 of these and I am encouraging you to come in and get tuned up to achieve 8 out of 8!  Here is what you have achieved so far in my goals for you:  1.) HbA1C  less than 7:                              NO  Your last  HbA1C :  Lab Results   Component Value Date    A1C 8.6 01/14/2022    A1C 9.8 08/12/2021    A1C >14.0 05/10/2021    A1C 10.7 02/10/2021    A1C 7.6 11/09/2020     A1C 7.9 08/07/2020    A1C 8.1 05/04/2020     2.) Blood Pressure less than 140/80:       YES      Your last    BP Readings from Last 1 Encounters:   03/24/22 116/64     3.) LDL less than 100:                              YES      Your last     LDL Cholesterol Calculated   Date Value Ref Range Status   01/14/2022 74 <=100 mg/dL Final   11/09/2020 74 <100 mg/dL Final     Comment:     Desirable:       <100 mg/dl       4.) Checked HbA1C in the past 6 months: YES      5.) Checked LDL in the past 12 months:    YES      6.) Taking one aspirin daily:                       YES     7.) No tobacco use:                                        NO   8.) Statin use      YES

## 2022-03-25 ENCOUNTER — TELEPHONE (OUTPATIENT)
Dept: EDUCATION SERVICES | Facility: OTHER | Age: 57
End: 2022-03-25
Payer: COMMERCIAL

## 2022-03-25 NOTE — TELEPHONE ENCOUNTER
Pt called she got her Omnipods yesterday and would like to know what to do next. Her copay was $4. I called the pt and left a message to call back.

## 2022-03-28 ENCOUNTER — TELEPHONE (OUTPATIENT)
Dept: EDUCATION SERVICES | Facility: OTHER | Age: 57
End: 2022-03-28
Payer: COMMERCIAL

## 2022-03-28 NOTE — TELEPHONE ENCOUNTER
Patient called wanting an appointment with Courtney Chaudhary to set up her Omnipod.  Appointment made for 3/31/22 at 11:15 AM.  Patient agreed with the plan.  CHRIST SPANN LPN

## 2022-03-29 ENCOUNTER — TELEPHONE (OUTPATIENT)
Dept: FAMILY MEDICINE | Facility: OTHER | Age: 57
End: 2022-03-29
Payer: COMMERCIAL

## 2022-03-29 NOTE — TELEPHONE ENCOUNTER
Courtney Chaudhary, APRN CNP  You Yesterday (8:16 AM)     DS    Yes, the PDM should be arriving any day via the mail.     Can you please call her to let her know.     Thanks     Courtney Edgar text

## 2022-03-29 NOTE — TELEPHONE ENCOUNTER
I called the pt she received the pdm yesterday and is going to have training on 04/06/22 at 1pm. She will be doing that here as she does not have internet services. Should she keep her appt with you on 03/31/22?

## 2022-03-29 NOTE — TELEPHONE ENCOUNTER
Myesha with [facility] needs order to continue weekly nurse visits. Gave the order for this.Please note.    Call back [phone].    Sruthi Casanova RN

## 2022-03-30 DIAGNOSIS — Z53.9 PERSONS ENCOUNTERING HEALTH SERVICES FOR SPECIFIC PROCEDURES, NOT CARRIED OUT: Primary | ICD-10-CM

## 2022-03-31 ENCOUNTER — MEDICAL CORRESPONDENCE (OUTPATIENT)
Dept: HEALTH INFORMATION MANAGEMENT | Facility: HOSPITAL | Age: 57
End: 2022-03-31
Payer: COMMERCIAL

## 2022-04-06 ENCOUNTER — ALLIED HEALTH/NURSE VISIT (OUTPATIENT)
Dept: EDUCATION SERVICES | Facility: OTHER | Age: 57
End: 2022-04-06
Attending: NURSE PRACTITIONER
Payer: COMMERCIAL

## 2022-04-06 DIAGNOSIS — E11.65 TYPE 2 DIABETES MELLITUS WITH HYPERGLYCEMIA, WITH LONG-TERM CURRENT USE OF INSULIN (H): Primary | ICD-10-CM

## 2022-04-06 DIAGNOSIS — Z79.4 TYPE 2 DIABETES MELLITUS WITH HYPERGLYCEMIA, WITH LONG-TERM CURRENT USE OF INSULIN (H): Primary | ICD-10-CM

## 2022-04-06 NOTE — PROGRESS NOTES
Marly trained on her Omnipod insulin pump via zoom with [facility] .     Courtney Chaudhary APRN FNP-BC  Diabetes and Wound Care

## 2022-04-11 ENCOUNTER — TRANSFERRED RECORDS (OUTPATIENT)
Dept: HEALTH INFORMATION MANAGEMENT | Facility: HOSPITAL | Age: 57
End: 2022-04-11
Payer: COMMERCIAL

## 2022-04-11 LAB — RETINOPATHY: NORMAL

## 2022-04-12 NOTE — PROGRESS NOTES
"  Assessment & Plan     Type 2 diabetes, HbA1c goal < 7% (H)  A1C 10.6. Too high. [facility]. Very difficult to manage as non-complaint with medications. On asa. On statin. BP at goal. Eye exam UTD. See me 3 months.    Microalbuminuria due to type 2 diabetes mellitus (H)  - Albumin Random Urine Quantitative with Creat Ratio; Future  -On ACE.     Essential hypertension  Well controlled. Continue current medications. Encouraged daily exercise and a low sodium diet. Recommended checking BP's 2x/wk, call the clinic if consistantly s>140 or d>90. Follow up in 3 months.     Hyperlipidemia, unspecified hyperlipidemia type  Tolerating statin. Will continue. AST and ALT normal in 1/2022.     Tobacco abuse  Cessation encouraged.     Pulmonary emphysema, unspecified emphysema type (H)  Stable. Continue current inhalers. Smoking cessation encouraged. Declines lung cancer screening.    Stage 3 chronic kidney disease, unspecified whether stage 3a or 3b CKD (H)  Stable. Will continue good BP control and work on better diabetic control. She will avoid NSAIDs.        .diag     BMI:   Estimated body mass index is 33.47 kg/m  as calculated from the following:    Height as of this encounter: 1.626 m (5' 4\").    Weight as of this encounter: 88.5 kg (195 lb).   Weight management plan: Discussed healthy diet and exercise guidelines      Return in about 3 months (around 7/15/2022).    Amberly Whyte NP  [facility] - [place] Luke is a 56 year old who presents for the following health issues    HPI     Diabetes Follow-up    How often are you checking your blood sugar? Two or more times daily  What time of day are you checking your blood sugars (select all that apply)?  Before meals  Have you had any blood sugars above 200?  No  Have you had any blood sugars below 70?  No    What symptoms do you notice when your blood sugar is low?  Shaky, Dizzy, Weak and Lethargy    What " concerns do you have today about your diabetes? None     Do you have any of these symptoms? (Select all that apply)  No numbness or tingling in feet.  No redness, sores or blisters on feet.  No complaints of excessive thirst.  No reports of blurry vision.  No significant changes to weight.     A1C was 8.6 on 1/14/22. [facility].    Taking insulin aspart, Jardiance, dulaglutide without side effects.     On asa.     She denies chest pain, dizziness, syncope, or palpitations. Some chronic shortness of breath related to her COPD. Feels thi has improved.     Working on weight loss.     Microalbuminuria present. On ACE.     She does have chronic kidney disease. Typically avoids NSAIDs.     No abdominal pain. No nausea or vomiting.     Diabetic Foot Screen:  Any complaints of increased pain or numbness ? No  Is there a foot ulcer now or a history of foot ulcer? No  Does the foot have an abnormal shape? No  Are the nails thick, too long or ingrown? No  Are there any redness or open areas? No         Sensation Testing done at all points on the diagram with monofilament     Right Foot: Sensation Normal at all points  Left Foot: Sensation Normal at all points     Risk Category: 0- No loss of protective sensation  Performed by Amberly Whyte LPN       Hyperlipidemia Follow-Up      Are you regularly taking any medication or supplement to lower your cholesterol?   Yes- atorvastatin    Are you having muscle aches or other side effects that you think could be caused by your cholesterol lowering medication?  No   She denies chest pain, dizziness, syncope, or palpitations. Some chronic shortness of breath related to her COPD.     Hypertension Follow-up      Do you check your blood pressure regularly outside of the clinic? No     Are you following a low salt diet? No    Are your blood pressures ever more than 140 on the top number (systolic) OR more   than 90 on the bottom number (diastolic), for example 140/90? N/A     -Taking Amlodipine 5 mg with lisinopril 40 mg. No side effects.  -She denies chest pain, dizziness, syncope, or palpitations. Some chronic shortness of breath related to her COPD.   -Smoking 1 ppd. No interesting in quitting.     BP Readings from Last 2 Encounters:   04/15/22 112/70   03/24/22 116/64     Hemoglobin A1C POCT (%)   Date Value   05/10/2021 >14.0 (H)   02/10/2021 10.7 (H)     Hemoglobin A1C (%)   Date Value   01/14/2022 8.6 (H)   08/12/2021 9.8 (H)     LDL Cholesterol Calculated (mg/dL)   Date Value   01/14/2022 74   11/09/2020 74   02/04/2020 49       COPD Follow-Up    Overall, how are your COPD symptoms since your last clinic visit?  Better    How much fatigue or shortness of breath do you have when you are walking?  Same as usual    How much shortness of breath do you have when you are resting?  Same as usual    How often do you cough? Rarely    Have you noticed any change in your sputum/phlegm?  No    Have you experienced a recent fever? No    Please describe how far you can walk without stopping to rest:  2-5 blocks    How many flights of stairs are you able to walk up without stopping?  2    Have you had any Emergency Room Visits, Urgent Care Visits, or Hospital Admissions because of your COPD since your last office visit?  No     Taking Combivent and Symbicort with PRN albuterol. Typically uses her albuterol daily. She feels her breathing is stable. No concerns.     Continues to smoke, smoking 1 ppd. No interest in quitting.      Due for lung cancer screening. Declines.     Due for a Tdap. Declines.     No nausea or vomiting. No abdominal pain.         History   Smoking Status     Current Every Day Smoker     Packs/day: 1.00     Years: 34.00     Types: Cigarettes     Start date: 1/1/1979   Smokeless Tobacco     Never Used     Comment: Declined QP 05/13/2020     No results found for: FEV1, YKC1HBF    Review of Systems   Constitutional, HEENT, cardiovascular, pulmonary, gi and gu systems are  "negative, except as otherwise noted.      Objective    /70 (BP Location: Left arm, Patient Position: Chair, Cuff Size: Adult Large)   Pulse 96   Temp 97.7  F (36.5  C) (Tympanic)   Ht 1.626 m (5' 4\")   Wt 88.5 kg (195 lb)   SpO2 91%   BMI 33.47 kg/m    Body mass index is 33.47 kg/m .  Physical Exam   GENERAL: overweight, alert and no distress  EYES: Eyes grossly normal to inspection, PERRL and conjunctivae and sclerae normal  HENT: ear canals and TM's normal, nose and mouth without ulcers or lesions  NECK: no adenopathy, no asymmetry, masses, or scars and thyroid normal to palpation  RESP: lungs clear to auscultation - no rales, rhonchi or wheezes  CV: regular rate and rhythm,  no murmur, no peripheral edema and peripheral pulses present, but diminished  NEURO: Normal strength and tone, mentation intact and speech normal  PSYCH: mentation appears normal, affect normal/bright  Diabetic foot exam: DP and PT pulses present, but diminshed, calluses on heels noted, no ulcerative lesions and normal sensory exam    Results for orders placed or performed in visit on 04/15/22 (from the past 24 hour(s))   Basic metabolic panel   Result Value Ref Range    Sodium 138 133 - 144 mmol/L    Potassium 4.1 3.4 - 5.3 mmol/L    Chloride 104 94 - 109 mmol/L    Carbon Dioxide (CO2) 30 20 - 32 mmol/L    Anion Gap 4 3 - 14 mmol/L    Urea Nitrogen 19 7 - 30 mg/dL    Creatinine 1.07 (H) 0.52 - 1.04 mg/dL    Calcium 9.4 8.5 - 10.1 mg/dL    Glucose 152 (H) 70 - 99 mg/dL    GFR Estimate 60 (L) >60 mL/min/1.73m2   Lipid Profile (Chol, Trig, HDL, LDL calc)   Result Value Ref Range    Cholesterol 172 <200 mg/dL    Triglycerides 148 <150 mg/dL    Direct Measure HDL 59 >=50 mg/dL    LDL Cholesterol Calculated 83 <=100 mg/dL    Non HDL Cholesterol 113 <130 mg/dL    Patient Fasting > 8hrs? Yes     Narrative    Cholesterol  Desirable:  <200 mg/dL    Triglycerides  Normal:  Less than 150 mg/dL  Borderline High:  150-199 mg/dL  High:  200-499 " mg/dL  Very High:  Greater than or equal to 500 mg/dL    Direct Measure HDL  Female:  Greater than or equal to 50 mg/dL   Male:  Greater than or equal to 40 mg/dL    LDL Cholesterol  Desirable:  <100mg/dL  Above Desirable:  100-129 mg/dL   Borderline High:  130-159 mg/dL   High:  160-189 mg/dL   Very High:  >= 190 mg/dL    Non HDL Cholesterol  Desirable:  130 mg/dL  Above Desirable:  130-159 mg/dL  Borderline High:  160-189 mg/dL  High:  190-219 mg/dL  Very High:  Greater than or equal to 220 mg/dL

## 2022-04-15 ENCOUNTER — LAB (OUTPATIENT)
Dept: LAB | Facility: OTHER | Age: 57
End: 2022-04-15
Payer: COMMERCIAL

## 2022-04-15 ENCOUNTER — OFFICE VISIT (OUTPATIENT)
Dept: FAMILY MEDICINE | Facility: OTHER | Age: 57
End: 2022-04-15
Attending: NURSE PRACTITIONER
Payer: COMMERCIAL

## 2022-04-15 VITALS
BODY MASS INDEX: 33.29 KG/M2 | TEMPERATURE: 97.7 F | DIASTOLIC BLOOD PRESSURE: 70 MMHG | HEART RATE: 96 BPM | OXYGEN SATURATION: 91 % | SYSTOLIC BLOOD PRESSURE: 112 MMHG | WEIGHT: 195 LBS | HEIGHT: 64 IN

## 2022-04-15 DIAGNOSIS — R80.9 MICROALBUMINURIA DUE TO TYPE 2 DIABETES MELLITUS (H): ICD-10-CM

## 2022-04-15 DIAGNOSIS — J43.9 PULMONARY EMPHYSEMA, UNSPECIFIED EMPHYSEMA TYPE (H): ICD-10-CM

## 2022-04-15 DIAGNOSIS — Z72.0 TOBACCO ABUSE: ICD-10-CM

## 2022-04-15 DIAGNOSIS — I10 ESSENTIAL HYPERTENSION: ICD-10-CM

## 2022-04-15 DIAGNOSIS — E78.5 HYPERLIPIDEMIA, UNSPECIFIED HYPERLIPIDEMIA TYPE: ICD-10-CM

## 2022-04-15 DIAGNOSIS — E11.9 TYPE 2 DIABETES, HBA1C GOAL < 7% (H): Primary | ICD-10-CM

## 2022-04-15 DIAGNOSIS — E11.9 TYPE 2 DIABETES, HBA1C GOAL < 7% (H): ICD-10-CM

## 2022-04-15 DIAGNOSIS — E11.29 MICROALBUMINURIA DUE TO TYPE 2 DIABETES MELLITUS (H): ICD-10-CM

## 2022-04-15 DIAGNOSIS — N18.30 STAGE 3 CHRONIC KIDNEY DISEASE, UNSPECIFIED WHETHER STAGE 3A OR 3B CKD (H): ICD-10-CM

## 2022-04-15 LAB
ANION GAP SERPL CALCULATED.3IONS-SCNC: 4 MMOL/L (ref 3–14)
BUN SERPL-MCNC: 19 MG/DL (ref 7–30)
CALCIUM SERPL-MCNC: 9.4 MG/DL (ref 8.5–10.1)
CHLORIDE BLD-SCNC: 104 MMOL/L (ref 94–109)
CHOLEST SERPL-MCNC: 172 MG/DL
CO2 SERPL-SCNC: 30 MMOL/L (ref 20–32)
CREAT SERPL-MCNC: 1.07 MG/DL (ref 0.52–1.04)
CREAT UR-MCNC: 103 MG/DL
EST. AVERAGE GLUCOSE BLD GHB EST-MCNC: 263 MG/DL
FASTING STATUS PATIENT QL REPORTED: YES
GFR SERPL CREATININE-BSD FRML MDRD: 60 ML/MIN/1.73M2
GLUCOSE BLD-MCNC: 152 MG/DL (ref 70–99)
HBA1C MFR BLD: 10.8 % (ref 0–5.6)
HDLC SERPL-MCNC: 59 MG/DL
LDLC SERPL CALC-MCNC: 83 MG/DL
MICROALBUMIN UR-MCNC: 324 MG/L
MICROALBUMIN/CREAT UR: 314.56 MG/G CR (ref 0–25)
NONHDLC SERPL-MCNC: 113 MG/DL
POTASSIUM BLD-SCNC: 4.1 MMOL/L (ref 3.4–5.3)
SODIUM SERPL-SCNC: 138 MMOL/L (ref 133–144)
TRIGL SERPL-MCNC: 148 MG/DL

## 2022-04-15 PROCEDURE — G0463 HOSPITAL OUTPT CLINIC VISIT: HCPCS | Mod: 25

## 2022-04-15 PROCEDURE — 99214 OFFICE O/P EST MOD 30 MIN: CPT | Performed by: NURSE PRACTITIONER

## 2022-04-15 PROCEDURE — 36415 COLL VENOUS BLD VENIPUNCTURE: CPT | Mod: ZL

## 2022-04-15 PROCEDURE — 82043 UR ALBUMIN QUANTITATIVE: CPT | Mod: ZL

## 2022-04-15 PROCEDURE — 83036 HEMOGLOBIN GLYCOSYLATED A1C: CPT | Mod: ZL

## 2022-04-15 PROCEDURE — 80048 BASIC METABOLIC PNL TOTAL CA: CPT | Mod: ZL

## 2022-04-15 PROCEDURE — 80061 LIPID PANEL: CPT | Mod: ZL

## 2022-04-15 PROCEDURE — G0463 HOSPITAL OUTPT CLINIC VISIT: HCPCS

## 2022-04-15 ASSESSMENT — PAIN SCALES - GENERAL: PAINLEVEL: NO PAIN (0)

## 2022-04-15 NOTE — NURSING NOTE
"Chief Complaint   Patient presents with     Diabetes     Lipids            Hypertension              Initial There were no vitals taken for this visit. Estimated body mass index is 32.96 kg/m  as calculated from the following:    Height as of 3/24/22: 1.626 m (5' 4\").    Weight as of 3/24/22: 87.1 kg (192 lb).  Medication Reconciliation: complete  Karolina Camacho LPN  "

## 2022-04-19 DIAGNOSIS — Z79.4 TYPE 2 DIABETES MELLITUS WITH HYPERGLYCEMIA, WITH LONG-TERM CURRENT USE OF INSULIN (H): ICD-10-CM

## 2022-04-19 DIAGNOSIS — E11.65 TYPE 2 DIABETES MELLITUS WITH HYPERGLYCEMIA, WITH LONG-TERM CURRENT USE OF INSULIN (H): ICD-10-CM

## 2022-04-20 DIAGNOSIS — Z79.4 TYPE 2 DIABETES MELLITUS WITH HYPERGLYCEMIA, WITH LONG-TERM CURRENT USE OF INSULIN (H): ICD-10-CM

## 2022-04-20 DIAGNOSIS — E11.9 TYPE 2 DIABETES, HBA1C GOAL < 7% (H): ICD-10-CM

## 2022-04-20 DIAGNOSIS — E11.65 TYPE 2 DIABETES MELLITUS WITH HYPERGLYCEMIA, WITH LONG-TERM CURRENT USE OF INSULIN (H): ICD-10-CM

## 2022-04-20 RX ORDER — LANCETS 33 GAUGE
1 EACH MISCELLANEOUS
Qty: 100 EACH | Refills: 11 | Status: SHIPPED | OUTPATIENT
Start: 2022-04-20

## 2022-04-20 RX ORDER — LANCETS 33 GAUGE
EACH MISCELLANEOUS
Qty: 100 EACH | Refills: 11 | Status: SHIPPED | OUTPATIENT
Start: 2022-04-20 | End: 2022-04-20

## 2022-04-20 NOTE — PROGRESS NOTES
Called Marly.      Reports the following:  Changing the pod every 3 days.   Using presets bolus once a day.     Bs     Still taking Jardiance 25 mg daily  Stopped Trulicity    Feeling a lot better    Denies any questions.     Asking for refills to [facility].   Wants me to refill a Slo-Mag prescription from her kidney specialist.    Will talk to PCP about this.     Courtney Chaudhary APRN FNP-BC  Diabetes and Wound Care

## 2022-04-25 DIAGNOSIS — G25.0 ESSENTIAL TREMOR: ICD-10-CM

## 2022-04-25 DIAGNOSIS — E55.9 VITAMIN D DEFICIENCY: ICD-10-CM

## 2022-04-27 RX ORDER — PRIMIDONE 50 MG/1
TABLET ORAL
Qty: 30 TABLET | Refills: 0 | Status: SHIPPED | OUTPATIENT
Start: 2022-04-27 | End: 2022-05-27

## 2022-04-27 RX ORDER — ACETAMINOPHEN 160 MG
TABLET,DISINTEGRATING ORAL
Qty: 90 CAPSULE | Refills: 0 | Status: SHIPPED | OUTPATIENT
Start: 2022-04-27 | End: 2022-07-26

## 2022-04-27 NOTE — TELEPHONE ENCOUNTER
primidone  Last Written Prescription Date: 2/7/22  Last Fill Quantity: 30 # of Refills: 1  Last Office Visit: 4/15/22    Vitamin D  Last Written Prescription Date: 5/11/21  Last Fill Quantity: 90 # of Refills: 3  Last Office Visit: 4/15/22

## 2022-04-28 ENCOUNTER — ALLIED HEALTH/NURSE VISIT (OUTPATIENT)
Dept: EDUCATION SERVICES | Facility: OTHER | Age: 57
End: 2022-04-28
Attending: NURSE PRACTITIONER
Payer: COMMERCIAL

## 2022-04-28 VITALS
WEIGHT: 192.9 LBS | OXYGEN SATURATION: 91 % | HEIGHT: 64 IN | SYSTOLIC BLOOD PRESSURE: 117 MMHG | DIASTOLIC BLOOD PRESSURE: 78 MMHG | BODY MASS INDEX: 32.93 KG/M2 | RESPIRATION RATE: 16 BRPM | HEART RATE: 91 BPM

## 2022-04-28 DIAGNOSIS — E11.65 TYPE 2 DIABETES MELLITUS WITH HYPERGLYCEMIA, WITH LONG-TERM CURRENT USE OF INSULIN (H): ICD-10-CM

## 2022-04-28 DIAGNOSIS — F17.200 NICOTINE DEPENDENCE, UNCOMPLICATED, UNSPECIFIED NICOTINE PRODUCT TYPE: Primary | ICD-10-CM

## 2022-04-28 DIAGNOSIS — Z79.4 TYPE 2 DIABETES MELLITUS WITH HYPERGLYCEMIA, WITH LONG-TERM CURRENT USE OF INSULIN (H): ICD-10-CM

## 2022-04-28 PROCEDURE — G0463 HOSPITAL OUTPT CLINIC VISIT: HCPCS

## 2022-04-28 PROCEDURE — 99213 OFFICE O/P EST LOW 20 MIN: CPT | Performed by: NURSE PRACTITIONER

## 2022-04-28 RX ORDER — VARENICLINE TARTRATE 1 MG/1
1 TABLET, FILM COATED ORAL 2 TIMES DAILY
Qty: 60 TABLET | Refills: 1 | Status: CANCELLED | OUTPATIENT
Start: 2022-05-28

## 2022-04-28 RX ORDER — NICOTINE 21 MG/24HR
1 PATCH, TRANSDERMAL 24 HOURS TRANSDERMAL EVERY 24 HOURS
Qty: 42 PATCH | Refills: 0 | Status: CANCELLED | OUTPATIENT
Start: 2022-04-28 | End: 2022-06-09

## 2022-04-28 RX ORDER — NICOTINE 21 MG/24HR
1 PATCH, TRANSDERMAL 24 HOURS TRANSDERMAL EVERY 24 HOURS
Qty: 28 PATCH | Refills: 0 | Status: CANCELLED | OUTPATIENT
Start: 2022-06-09 | End: 2022-07-07

## 2022-04-28 ASSESSMENT — PAIN SCALES - GENERAL: PAINLEVEL: NO PAIN (0)

## 2022-04-28 NOTE — PROGRESS NOTES
"Chief Complaint   Patient presents with     Diabetes       Initial /78   Pulse 91   Resp 16   Ht 1.626 m (5' 4\")   Wt 87.5 kg (192 lb 14.4 oz)   SpO2 91%   BMI 33.11 kg/m   Estimated body mass index is 33.11 kg/m  as calculated from the following:    Height as of this encounter: 1.626 m (5' 4\").    Weight as of this encounter: 87.5 kg (192 lb 14.4 oz).  Medication Reconciliation: complete  Gege Walter LPN    "

## 2022-04-28 NOTE — PATIENT INSTRUCTIONS
Continue working on healthy eating and moving (start low and slow, work up to 30 min, 5x/week)    BG goals:  Fasting and before meals <130, >70  2 hour after eating <180    We only need 1/2 of these numbers to be within target then your A1c will be within target    Medication changes   Keep working on entering bolus with every meal.     Follow up   July     Call me sooner if any problems/concerns and/or questions develop including consistent low BGs <70 or consistent high BGs >200  [phone] (Unit Coordinator)    [phone] (Anna, Nurse)    Smoking:   Try keep working on reducing the amount of cigarettes

## 2022-04-28 NOTE — PROGRESS NOTES
SUBJECTIVE:  Marly Cannon, 56 year old, female presents with the following Chief Complaint(s) with HPI to follow:  Chief Complaint   Patient presents with     Diabetes        Diabetes Follow-up    Patient is checking blood sugars: 0-1x/day.  Results:    Date: 4/14 to 4/28/22  Glucose management indicator: n/a    Average glucose: 197  Highest: 402  Lowest: 123    Glucose range  Very low (<54): 0  low (<70): 0  In target range (): 65%  High (>180): 15%  Very high (>250): 20%        Symptoms of hypoglycemia (low blood sugar): no    Paresthesias (numbness or burning in feet) or sores: no, no sores    Diabetic eye exam within the last year: UTD    Breakfast eaten regularly: most of the time    Patient counting carbs: Yes    Exercising: depends on the weather          HPI:  Marly's here today for the follow up regarding her Diabetes mellitus, Type 2.    Lab Results   Component Value Date    A1C 8.6 01/14/2022    A1C 9.8 08/12/2021    A1C >14.0 05/10/2021    A1C 10.7 02/10/2021    A1C 7.6 11/09/2020    A1C 7.9 08/07/2020    A1C 8.1 05/04/2020     Current Diabetes medication:   1. Omnipod dash, using Novolog   2. Jardiance 25 mg daily  Statin use: yes, simvastatin 20 mg daily  ASA: yes, 81 mg daily    Marly's here for a meter download and possible diabetic medication adjustments.   Reports the following:  Denies any issues with the Omnipod Dash  She loves it.     Denies any new health concerns.     Weight trend   Wt Readings from Last 4 Encounters:   04/28/22 87.5 kg (192 lb 14.4 oz)   04/15/22 88.5 kg (195 lb)   03/24/22 87.1 kg (192 lb)   02/24/22 88.5 kg (195 lb 1.6 oz)         Patient Active Problem List   Diagnosis     Type 2 diabetes, HbA1c goal < 7% (H)     ACP (advance care planning)     Stage 3 chronic kidney disease (H)     Pulmonary emphysema (H)     Hyperparathyroidism due to renal insufficiency (H)     Vitamin D deficiency     Intellectual disability     Breast fibrocystic disorder     Tobacco  abuse     Microalbuminuria due to type 2 diabetes mellitus (H)     H/O colonoscopy     [place] cardiac risk <10% in next 10 years     Tubular adenoma of colon     Hyperlipidemia, unspecified hyperlipidemia type     Essential hypertension     Callus of foot     Memory disturbance     ADEEL (obstructive sleep apnea)     Tremor       No past medical history on file.    Past Surgical History:   Procedure Laterality Date     COLONOSCOPY N/A 8/20/2015    Procedure: COLONOSCOPY;  Surgeon: Gentry Porter MD;  Location: [facility]     COLONOSCOPY N/A 9/21/2018    Procedure: COLONOSCOPY;  COLONOSCOPY WITH POLYPECTOMY;  Surgeon: Gentry Porter MD;  Location: [facility]       Family History   Problem Relation Age of Onset     Diabetes Mother      Diabetes Father      Hypertension Brother      Diabetes Sister        Social History     Tobacco Use     Smoking status: Current Every Day Smoker     Packs/day: 1.00     Years: 34.00     Pack years: 34.00     Types: Cigarettes     Start date: 1/1/1979     Smokeless tobacco: Never Used     Tobacco comment: Declined QP 05/13/2020   Substance Use Topics     Alcohol use: No     Alcohol/week: 0.0 standard drinks       Current Outpatient Medications   Medication Sig Dispense Refill     Acetaminophen (TYLENOL PO) Take 325 mg by mouth every 6 hours as needed        amLODIPine (NORVASC) 5 MG tablet TAKE 1 TABLET BY MOUTH DAILY 90 tablet 0     ammonium lactate (AMLACTIN) 12 % external cream Apply topically 2 times daily       aspirin (ASPIRIN LOW DOSE) 81 MG chewable tablet CHEW 1 TABLET BY MOUTH DAILY. DO NOT SPLIT OR CRUSH 90 tablet 0     atorvastatin (LIPITOR) 40 MG tablet TAKE 1 TABLET BY MOUTH AT BEDTIME 90 tablet 2     blood glucose (ONETOUCH ULTRA) test strip Use to test blood sugar 4 times daily 150 strip 3     budesonide-formoterol (SYMBICORT) 160-4.5 MCG/ACT Inhaler Inhale 2 puffs into the lungs 2 times daily 1 Inhaler 3     Cholecalciferol (VITAMIN D3) 50 MCG (2000 UT) CAPS TAKE 1  CAPSULE BY MOUTH DAILY 90 capsule 0     COMBIVENT RESPIMAT  MCG/ACT inhaler INHALE 1 PUFF INTO THE LUNGS 4 TIMES DAILY 4 g 2     docusate sodium (COLACE) 100 MG capsule TAKE 1 CAPSULE BY MOUTH DAILY 90 capsule 0     empagliflozin (JARDIANCE) 25 MG TABS tablet Take 1 tablet (25 mg) by mouth daily 30 tablet 3     hydrocortisone (CORTAID) 1 % external cream Apply topically 2 times daily 453.6 g 0     hydrocortisone 2.5 % cream Apply topically 2 times daily       insulin aspart (NOVOLOG VIAL) 100 UNITS/ML vial To be used with the V40.  TDD: 76 units 40 mL 3     Insulin Disposable Pump (OMNIPOD DASH 5 PACK PODS) MISC 1 pod every 48 hours 15 each 5     insulin pen needle (32G X 4 MM) 32G X 4 MM miscellaneous Use 1 pen needles daily 100 each 3     lisinopril (ZESTRIL) 40 MG tablet TAKE 1 TABLET BY MOUTH DAILY 90 tablet 0     Magnesium Cl-Calcium Carbonate 71.5-119 MG TBEC Take 2 tablets by mouth       nystatin (MYCOSTATIN) 255407 UNIT/GM external powder Apply topically 3 times daily 60 g 3     OneTouch Delica Lancets 33G MISC Inject 1 each Subcutaneous 3 times daily (before meals) 100 each 11     order for DME Women briefs 50 each 1     polyethylene glycol (MIRALAX) 17 g packet Take 17 g by mouth 3 times daily       primidone (MYSOLINE) 50 MG tablet TAKE 1 TABLET BY MOUTH AT BEDTIME 30 tablet 0     tiZANidine (ZANAFLEX) 4 MG tablet Take 1 tablet (4 mg) by mouth 3 times daily as needed for muscle spasms 30 tablet 0     triamcinolone (KENALOG) 0.1 % external cream USE AS DIRECTED 80 g 0     urea (GORDONS UREA) 40 % external ointment Apply topically 2 times daily 30 g 0     wearable insulin delivery device (V-GO 40) kit Insulin delivery device to be changed every 24 hours 30 each 4       No Known Allergies    REVIEW OF SYSTEMS  Skin: negative  Eyes: negative, wears glasses    Ears/Nose/Throat: negative  Respiratory: No shortness of breath or hemoptysis; smoker's cough; hx of sleep apnea  Cardiovascular:  "negative  Gastrointestinal: negative  Genitourinary: negative  Musculoskeletal: negative; hx of left shoulder pain and left knee--depends on the weather   Neurologic: negative  Psychiatric: negative  Hematologic/Lymphatic/Immunologic: negative  Endocrine: positive for diabetes     OBJECTIVE:  /78   Pulse 91   Resp 16   Ht 1.626 m (5' 4\")   Wt 87.5 kg (192 lb 14.4 oz)   SpO2 91%   BMI 33.11 kg/m    Constitutional: alert and no distress  Respiratory:  Good diaphragmatic excursion.   Musculoskeletal: extremities normal- no gross deformities noted and gait normal  Skin: no suspicious lesions or rashes to visible skin  Psychiatric: mentation appears normal and affect normal/bright      LABS  No results found for any visits on 04/28/22.    ASSESSMENT / PLAN:  (F17.200) Nicotine dependence, uncomplicated, unspecified nicotine product type  (primary encounter diagnosis)  Comment:   Spoke with patient regarding options for smoking cessation.  Various pharmacologic options and behavioral techniques were reviewed.  The relative effectiveness as well as risks and benefits were reviewed with patient.  Patient is interested in: No interventions    Smoking Cessation    Plan:   Marly's aware to let us know when she's ready to quit smoking.  Discussed the dangers of smoking with diabetes      (E11.65,  Z79.4) Type 2 diabetes mellitus with hyperglycemia, with long-term current use of insulin (H)  Comment: noted insulin pump information    Insulin  Basal: 17.5 (77%)  Bolus: 5.3 (23%)  TDD: 30    Insulin pump settings:    Basal: 0.8  Noted a \"waffle\" basal  ISF: 45  CR: 12    Plan:   Wow!!  BG ave: 197, TIR: 65%    Encouraged her to keep working on entering the carb bolus every time she consumes carbs--breakfast and supper    Keep up the hard work    Follow up  Will have her come next week--nurse only for a download to address the waffle bolus.     Call sooner if any issues    Patient Instructions     Continue working on " healthy eating and moving (start low and slow, work up to 30 min, 5x/week)    BG goals:  Fasting and before meals <130, >70  2 hour after eating <180    We only need 1/2 of these numbers to be within target then your A1c will be within target    Medication changes   Keep working on entering bolus with every meal.     Follow up   July     Call me sooner if any problems/concerns and/or questions develop including consistent low BGs <70 or consistent high BGs >200  [phone] (Unit Coordinator)    [phone] (Anna, Nurse)    Smoking:   Try keep working on reducing the amount of cigarettes                 Time: 25 minutes  Barrier: see PMH  Willingness to learn: accepting    Courtney PINTO FNP-BC  Diabetes and Wound Care    Cc: Amberly Whyte NP        With the electronic record, we can now more quickly and easily track our patient diabetic goals. Our diabetes clinical review is in progress and these are the indicators we are monitoring for good diabetes health.     1.) HbA1C less than 7 (measurement of your average blood sugars)  2.) Blood Pressure less than 140/80  3.) LDL less than 100 (bad cholesterol)  4.) HbA1C is checked in the last 6 months and below 7% (more frequently if not at goal or adjusting medications)  5.) LDL is checked in the last 12 months (more frequently if not at goal or adjusting medications)  6.) Taking one baby aspirin daily (unless otherwise instructed)  7.) No tobacco use  8) Statin use     You have achieved 6 out of 8 of these and I am encouraging you to come in and get tuned up to achieve 8 out of 8!  Here is what you have achieved so far in my goals for you:  1.) HbA1C  less than 7:                              NO  Your last  HbA1C :  Lab Results   Component Value Date    A1C 8.6 01/14/2022    A1C 9.8 08/12/2021    A1C >14.0 05/10/2021    A1C 10.7 02/10/2021    A1C 7.6 11/09/2020    A1C 7.9 08/07/2020    A1C 8.1 05/04/2020     2.) Blood Pressure less than 140/80:       YES      Your  last    BP Readings from Last 1 Encounters:   04/28/22 117/78     3.) LDL less than 100:                              YES      Your last     LDL Cholesterol Calculated   Date Value Ref Range Status   04/15/2022 83 <=100 mg/dL Final   11/09/2020 74 <100 mg/dL Final     Comment:     Desirable:       <100 mg/dl       4.) Checked HbA1C in the past 6 months: YES      5.) Checked LDL in the past 12 months:    YES      6.) Taking one aspirin daily:                       YES     7.) No tobacco use:                                        NO   8.) Statin use      YES         Addendum:  Called Marly and told her not to use the waffle feature.    If BGs are higher today, please change out the pod tomorrow.

## 2022-05-02 DIAGNOSIS — Z79.4 TYPE 2 DIABETES MELLITUS WITH HYPERGLYCEMIA, WITH LONG-TERM CURRENT USE OF INSULIN (H): ICD-10-CM

## 2022-05-02 DIAGNOSIS — E11.65 TYPE 2 DIABETES MELLITUS WITH HYPERGLYCEMIA, WITH LONG-TERM CURRENT USE OF INSULIN (H): ICD-10-CM

## 2022-05-02 NOTE — TELEPHONE ENCOUNTER
Test strips      Last Written Prescription Date:  12/07/20  Last Fill Quantity: 150,   # refills: 3  Last Office Visit: 04/28/22  Future Office visit:    Next 5 appointments (look out 90 days)    Jul 15, 2022  9:40 AM  (Arrive by 9:25 AM)  SHORT with Amberly Whyte NP  [facility] [place] ([facility] [place] ) [address]  [place] [address]  [phone]   Jul 15, 2022 10:30 AM  (Arrive by 10:15 AM)  Nurse Only with Hc Dm Nurse  [facility] [place] ([facility] [place] ) [address] [place] [address] [phone]

## 2022-05-03 RX ORDER — BLOOD SUGAR DIAGNOSTIC
STRIP MISCELLANEOUS
Qty: 100 STRIP | Refills: 1 | Status: SHIPPED | OUTPATIENT
Start: 2022-05-03 | End: 2024-01-29

## 2022-05-26 ENCOUNTER — TELEPHONE (OUTPATIENT)
Dept: FAMILY MEDICINE | Facility: OTHER | Age: 57
End: 2022-05-26
Payer: COMMERCIAL

## 2022-05-26 NOTE — TELEPHONE ENCOUNTER
[facility]  [phone]    Verbal order to continue weekly nurse visits.    Verbal order given    Nina Santana RN

## 2022-06-01 ENCOUNTER — DOCUMENTATION ONLY (OUTPATIENT)
Dept: FAMILY MEDICINE | Facility: OTHER | Age: 57
End: 2022-06-01
Payer: COMMERCIAL

## 2022-06-01 DIAGNOSIS — R39.81 FUNCTIONAL URINARY INCONTINENCE: Primary | ICD-10-CM

## 2022-06-08 DIAGNOSIS — Z53.9 PERSONS ENCOUNTERING HEALTH SERVICES FOR SPECIFIC PROCEDURES, NOT CARRIED OUT: Primary | ICD-10-CM

## 2022-07-13 NOTE — PROGRESS NOTES
Assessment & Plan     Type 2 diabetes mellitus with hyperglycemia, with long-term current use of insulin (H)  A1C 10.4. Will continue follow-up with the [facility]. Difficult with non-compliance. On statin. On asa. BP at goal. See me back in 3 months.     - Hemoglobin A1c; Future  - Basic metabolic panel; Future    Microalbuminuria due to type 2 diabetes mellitus (H)  On ACE.     Tobacco abuse  Cessation encouraged.     Pulmonary emphysema, unspecified emphysema type (H)  Stable. Smoking cessation encouraged.     Stage 3 chronic kidney disease, unspecified whether stage 3a or 3b CKD (H)  Stable. Creatinine was 1.1 which is around her baseline.     - Basic metabolic panel; Future    Hyperlipidemia, unspecified hyperlipidemia type  Controlled. Liver function normal. Continue statin.     Essential hypertension  Well controlled. Continue current medications. Encouraged daily exercise and a low sodium diet. Recommended checking BP's 2x/wk, call the clinic if consistantly s>140 or d>90. Follow up in 3 months.     Personal history of tobacco use  - Prof fee: Shared Decision Making for Lung Cancer Screening  - CT Chest Lung Cancer Scrn Low Dose wo; Future  -Will notify patient of the results when available and intervene accordingly.   -Has smoked 1 ppd since she was 14.     Weight loss  Patient has lost 10 pounds in the past year. Most likely multifactorial. Her diabetes is poorly controlled which most likely is causing weight loss. She also admits to drinking less coke and walking more. Will continue to monitor. Denies any fevers, night sweats, URI symptoms, or abdominal pain. Will update low dose chest CT, other preventative scans are UTD      Amberly Whyte NP  [facility] [place] Luke is a 56 year old, presenting for the following health issues:  Diabetes, Lipids, and COPD      HPI     Diabetes Follow-up    How often are you checking your blood sugar? One time daily  What time of day  are you checking your blood sugars (select all that apply)?  Before meals  Have you had any blood sugars above 200?  No  Have you had any blood sugars below 70?  No    What symptoms do you notice when your blood sugar is low?  None    What concerns do you have today about your diabetes? None     Do you have any of these symptoms? (Select all that apply)  No numbness or tingling in feet.  No redness, sores or blisters on feet.  No complaints of excessive thirst.  No reports of blurry vision.  No significant changes to weight.     Follows with the diabetic center. Was last seen in 4/2022. Note was reviewed. Sees them again today.     A1C was 10.8 on 4/15/22.     Taking insulin aspart and Jardiance without side effects.     On asa.     She denies chest pain, dizziness, syncope, or palpitations. Some chronic shortness of breath related to her COPD. Feels this has improved. Denies any recent shortness of breath.     Working on weight loss. Limits her carbs. Walking outside more.     Microalbuminuria present. On ACE.     She does have chronic kidney disease. Typically avoids NSAIDs.     No abdominal pain. No nausea or vomiting.     Diabetic Foot Screen:  Any complaints of increased pain or numbness ? No  Is there a foot ulcer now or a history of foot ulcer? No  Does the foot have an abnormal shape? No  Are the nails thick, too long or ingrown? No  Are there any redness or open areas? No         Sensation Testing done at all points on the diagram with monofilament     Right Foot: Sensation Normal at all points  Left Foot: Sensation Normal at all points     Risk Category: 0- No loss of protective sensation  Performed by Amberly Whyte CNP       Hyperlipidemia Follow-Up      Are you regularly taking any medication or supplement to lower your cholesterol?   Yes- atorvastatin    Are you having muscle aches or other side effects that you think could be caused by your cholesterol lowering medication?  No   -She denies chest pain,  dizziness, syncope, or palpitations. Some chronic shortness of breath related to her COPD.     Hypertension Follow-up      Do you check your blood pressure regularly outside of the clinic? Yes     Are you following a low salt diet? No    Are your blood pressures ever more than 140 on the top number (systolic) OR more   than 90 on the bottom number (diastolic), for example 140/90? No   -Taking Amlodipine 5 mg with lisinopril 40 mg. No side effects.  -She denies chest pain, dizziness, syncope, or palpitations. Some chronic shortness of breath related to her COPD.  -Smoking 1 ppd. No interesting in quitting.     COPD Follow-Up    Overall, how are your COPD symptoms since your last clinic visit?  Better    How much fatigue or shortness of breath do you have when you are walking?  Same as usual    How much shortness of breath do you have when you are resting?  Same as usual    How often do you cough? Rarely    Have you noticed any change in your sputum/phlegm?  No    Have you experienced a recent fever? No    Please describe how far you can walk without stopping to rest:  2-5 blocks    How many flights of stairs are you able to walk up without stopping?  2    Have you had any Emergency Room Visits, Urgent Care Visits, or Hospital Admissions because of your COPD since your last office visit?  No     Taking Combivent and Symbicort with PRN albuterol. Typically uses her albuterol daily. She feels her breathing is stable. No concerns.     Continues to smoke, smoking 1 ppd. No interest in quitting.      Due for lung cancer screening. Willing to complete after much encouragement.     No fevers. No cough or cold like symptoms.      Due for a Tdap. Declines.     BP Readings from Last 2 Encounters:   07/15/22 110/74   04/28/22 117/78     Hemoglobin A1C POCT (%)   Date Value   05/10/2021 >14.0 (H)   02/10/2021 10.7 (H)     Hemoglobin A1C (%)   Date Value   07/15/2022 10.4 (H)   04/15/2022 10.8 (H)     LDL Cholesterol Calculated  "(mg/dL)   Date Value   07/15/2022 66   04/15/2022 83   11/09/2020 74   02/04/2020 49         How many servings of fruits and vegetables do you eat daily?  0-1    On average, how many sweetened beverages do you drink each day (Examples: soda, juice, sweet tea, etc.  Do NOT count diet or artificially sweetened beverages)?   0    How many days per week do you exercise enough to make your heart beat faster? 3 or less    How many minutes a day do you exercise enough to make your heart beat faster? 20 - 29    How many days per week do you miss taking your medication? 0      Review of Systems   Constitutional, HEENT, cardiovascular, pulmonary, gi and gu systems are negative, except as otherwise noted.      Objective    /74 (BP Location: Right arm, Patient Position: Chair, Cuff Size: Adult Large)   Pulse 84   Temp 97.3  F (36.3  C) (Tympanic)   Ht 1.626 m (5' 4\")   Wt 85.7 kg (189 lb)   SpO2 92%   BMI 32.44 kg/m    Body mass index is 32.44 kg/m .  Physical Exam   GENERAL: healthy, alert and no distress  EYES: Eyes grossly normal to inspection, PERRL and conjunctivae and sclerae normal  HENT: ear canals and TM's normal, nose and mouth without ulcers or lesions  NECK: no adenopathy, no asymmetry, masses, or scars and thyroid normal to palpation  RESP: lungs clear to auscultation - no rales, rhonchi or wheezes  CV: regular rate and rhythm, no murmur, click or rub, no peripheral edema and peripheral pulses present, but diminished  ABDOMEN: soft, nontender, no distension, no masses and bowel sounds normal  MS: no gross musculoskeletal defects noted, no edema  NEURO: Normal strength and tone, mentation intact and speech normal  PSYCH: mentation appears normal, affect normal/bright  Diabetic foot exam: DP and PT pulses present, but diminished, no trophic changes or ulcerative lesions and normal sensory exam    Results for orders placed or performed in visit on 07/15/22 (from the past 24 hour(s))   Lipid Profile (Chol, " Trig, HDL, LDL calc)   Result Value Ref Range    Cholesterol 161 <200 mg/dL    Triglycerides 224 (H) <150 mg/dL    Direct Measure HDL 50 >=50 mg/dL    LDL Cholesterol Calculated 66 <=100 mg/dL    Non HDL Cholesterol 111 <130 mg/dL    Patient Fasting > 8hrs? Yes     Narrative    Cholesterol  Desirable:  <200 mg/dL    Triglycerides  Normal:  Less than 150 mg/dL  Borderline High:  150-199 mg/dL  High:  200-499 mg/dL  Very High:  Greater than or equal to 500 mg/dL    Direct Measure HDL  Female:  Greater than or equal to 50 mg/dL   Male:  Greater than or equal to 40 mg/dL    LDL Cholesterol  Desirable:  <100mg/dL  Above Desirable:  100-129 mg/dL   Borderline High:  130-159 mg/dL   High:  160-189 mg/dL   Very High:  >= 190 mg/dL    Non HDL Cholesterol  Desirable:  130 mg/dL  Above Desirable:  130-159 mg/dL  Borderline High:  160-189 mg/dL  High:  190-219 mg/dL  Very High:  Greater than or equal to 220 mg/dL   Comprehensive metabolic panel (BMP + Alb, Alk Phos, ALT, AST, Total. Bili, TP)   Result Value Ref Range    Sodium 137 133 - 144 mmol/L    Potassium 4.1 3.4 - 5.3 mmol/L    Chloride 103 94 - 109 mmol/L    Carbon Dioxide (CO2) 31 20 - 32 mmol/L    Anion Gap 3 3 - 14 mmol/L    Urea Nitrogen 17 7 - 30 mg/dL    Creatinine 1.10 (H) 0.52 - 1.04 mg/dL    Calcium 9.3 8.5 - 10.1 mg/dL    Glucose 249 (H) 70 - 99 mg/dL    Alkaline Phosphatase 89 40 - 150 U/L    AST 10 0 - 45 U/L    ALT 17 0 - 50 U/L    Protein Total 7.8 6.8 - 8.8 g/dL    Albumin 3.5 3.4 - 5.0 g/dL    Bilirubin Total 0.3 0.2 - 1.3 mg/dL    GFR Estimate 58 (L) >60 mL/min/1.73m2   Hemoglobin A1c   Result Value Ref Range    Estimated Average Glucose 252 mg/dL    Hemoglobin A1C 10.4 (H) 0.0 - 5.6 %             ..  Lung Cancer Screening Shared Decision Making Visit     Marly Cannon, a 56 year old female, is eligible for lung cancer screening    History   Smoking Status     Current Every Day Smoker     Packs/day: 1.00     Years: 34.00     Types: Cigarettes     Start  date: 1/1/1979   Smokeless Tobacco     Never Used     Comment: Declined QP 05/13/2020   [id number]}    I have discussed with patient the risks and benefits of screening for lung cancer with low-dose CT.     The risks include:    radiation exposure: one low dose chest CT has as much ionizing radiation as about 15 chest x-rays, or 6 months of background radiation living in [place]      false positives: most findings/nodules are NOT cancer, but some might still require additional diagnostic evaluation, including biopsy    over-diagnosis: some slow growing cancers that might never have been clinically significant will be detected and treated unnecessarily     The benefit of early detection of lung cancer is contingent upon adherence to annual screening or more frequent follow up if indicated.     Furthermore, to benefit from screening, Marly must be willing and able to undergo diagnostic procedures, if indicated. Although no specific guide is available for determining severity of comorbidities, it is reasonable to withhold screening in patients who have greater mortality risk from other diseases.     We did discuss that the best way to prevent lung cancer is to not smoke.    Some patients may value a numeric estimation of lung cancer risk when evaluating if lung cancer screening is right for them, here is one calculator:    ShouldIScreen

## 2022-07-15 ENCOUNTER — OFFICE VISIT (OUTPATIENT)
Dept: FAMILY MEDICINE | Facility: OTHER | Age: 57
End: 2022-07-15
Attending: NURSE PRACTITIONER
Payer: COMMERCIAL

## 2022-07-15 ENCOUNTER — LAB (OUTPATIENT)
Dept: LAB | Facility: OTHER | Age: 57
End: 2022-07-15
Attending: NURSE PRACTITIONER
Payer: COMMERCIAL

## 2022-07-15 VITALS
HEART RATE: 84 BPM | OXYGEN SATURATION: 92 % | SYSTOLIC BLOOD PRESSURE: 110 MMHG | TEMPERATURE: 97.3 F | DIASTOLIC BLOOD PRESSURE: 74 MMHG | HEIGHT: 64 IN | WEIGHT: 189 LBS | BODY MASS INDEX: 32.27 KG/M2

## 2022-07-15 DIAGNOSIS — I10 ESSENTIAL HYPERTENSION: ICD-10-CM

## 2022-07-15 DIAGNOSIS — E78.5 HYPERLIPIDEMIA, UNSPECIFIED HYPERLIPIDEMIA TYPE: ICD-10-CM

## 2022-07-15 DIAGNOSIS — R63.4 WEIGHT LOSS: ICD-10-CM

## 2022-07-15 DIAGNOSIS — E11.9 TYPE 2 DIABETES, HBA1C GOAL < 7% (H): ICD-10-CM

## 2022-07-15 DIAGNOSIS — E11.29 MICROALBUMINURIA DUE TO TYPE 2 DIABETES MELLITUS (H): ICD-10-CM

## 2022-07-15 DIAGNOSIS — N18.30 STAGE 3 CHRONIC KIDNEY DISEASE, UNSPECIFIED WHETHER STAGE 3A OR 3B CKD (H): ICD-10-CM

## 2022-07-15 DIAGNOSIS — R80.9 MICROALBUMINURIA DUE TO TYPE 2 DIABETES MELLITUS (H): ICD-10-CM

## 2022-07-15 DIAGNOSIS — Z72.0 TOBACCO ABUSE: ICD-10-CM

## 2022-07-15 DIAGNOSIS — Z87.891 PERSONAL HISTORY OF TOBACCO USE: ICD-10-CM

## 2022-07-15 DIAGNOSIS — E11.65 TYPE 2 DIABETES MELLITUS WITH HYPERGLYCEMIA, WITH LONG-TERM CURRENT USE OF INSULIN (H): Primary | ICD-10-CM

## 2022-07-15 DIAGNOSIS — J43.9 PULMONARY EMPHYSEMA, UNSPECIFIED EMPHYSEMA TYPE (H): ICD-10-CM

## 2022-07-15 DIAGNOSIS — Z79.4 TYPE 2 DIABETES MELLITUS WITH HYPERGLYCEMIA, WITH LONG-TERM CURRENT USE OF INSULIN (H): Primary | ICD-10-CM

## 2022-07-15 LAB
ALBUMIN SERPL-MCNC: 3.5 G/DL (ref 3.4–5)
ALP SERPL-CCNC: 89 U/L (ref 40–150)
ALT SERPL W P-5'-P-CCNC: 17 U/L (ref 0–50)
ANION GAP SERPL CALCULATED.3IONS-SCNC: 3 MMOL/L (ref 3–14)
AST SERPL W P-5'-P-CCNC: 10 U/L (ref 0–45)
BILIRUB SERPL-MCNC: 0.3 MG/DL (ref 0.2–1.3)
BUN SERPL-MCNC: 17 MG/DL (ref 7–30)
CALCIUM SERPL-MCNC: 9.3 MG/DL (ref 8.5–10.1)
CHLORIDE BLD-SCNC: 103 MMOL/L (ref 94–109)
CHOLEST SERPL-MCNC: 161 MG/DL
CO2 SERPL-SCNC: 31 MMOL/L (ref 20–32)
CREAT SERPL-MCNC: 1.1 MG/DL (ref 0.52–1.04)
EST. AVERAGE GLUCOSE BLD GHB EST-MCNC: 252 MG/DL
FASTING STATUS PATIENT QL REPORTED: YES
GFR SERPL CREATININE-BSD FRML MDRD: 58 ML/MIN/1.73M2
GLUCOSE BLD-MCNC: 249 MG/DL (ref 70–99)
HBA1C MFR BLD: 10.4 % (ref 0–5.6)
HDLC SERPL-MCNC: 50 MG/DL
LDLC SERPL CALC-MCNC: 66 MG/DL
NONHDLC SERPL-MCNC: 111 MG/DL
POTASSIUM BLD-SCNC: 4.1 MMOL/L (ref 3.4–5.3)
PROT SERPL-MCNC: 7.8 G/DL (ref 6.8–8.8)
SODIUM SERPL-SCNC: 137 MMOL/L (ref 133–144)
TRIGL SERPL-MCNC: 224 MG/DL

## 2022-07-15 PROCEDURE — 80053 COMPREHEN METABOLIC PANEL: CPT | Mod: ZL

## 2022-07-15 PROCEDURE — 80061 LIPID PANEL: CPT | Mod: ZL

## 2022-07-15 PROCEDURE — G0463 HOSPITAL OUTPT CLINIC VISIT: HCPCS | Performed by: NURSE PRACTITIONER

## 2022-07-15 PROCEDURE — G0296 VISIT TO DETERM LDCT ELIG: HCPCS | Performed by: NURSE PRACTITIONER

## 2022-07-15 PROCEDURE — 99214 OFFICE O/P EST MOD 30 MIN: CPT | Performed by: NURSE PRACTITIONER

## 2022-07-15 PROCEDURE — 83036 HEMOGLOBIN GLYCOSYLATED A1C: CPT | Mod: ZL

## 2022-07-15 PROCEDURE — 36415 COLL VENOUS BLD VENIPUNCTURE: CPT | Mod: ZL

## 2022-07-15 ASSESSMENT — PAIN SCALES - GENERAL: PAINLEVEL: NO PAIN (0)

## 2022-07-15 NOTE — NURSING NOTE
"Chief Complaint   Patient presents with     Diabetes     Lipids     COPD       Initial /74 (BP Location: Right arm, Patient Position: Chair, Cuff Size: Adult Large)   Pulse 84   Temp 97.3  F (36.3  C) (Tympanic)   Ht 1.626 m (5' 4\")   Wt 85.7 kg (189 lb)   SpO2 92%   BMI 32.44 kg/m   Estimated body mass index is 32.44 kg/m  as calculated from the following:    Height as of this encounter: 1.626 m (5' 4\").    Weight as of this encounter: 85.7 kg (189 lb).  Medication Reconciliation: complete  Karolina Camacho LPN  "

## 2022-07-15 NOTE — PATIENT INSTRUCTIONS
Lung Cancer Screening   Frequently Asked Questions  If you are at high-risk for lung cancer, getting screened with low-dose computed tomography (LDCT) every year can help save your life. This handout offers answers to some of the most common questions about lung cancer screening. If you have other questions, please call [phone] ([phone]).     What is it?  Lung cancer screening uses special X-ray technology to create an image of your lung tissue. The exam is quick and easy and takes less than 10 seconds. We don t give you any medicine or use any needles. You can eat before and after the exam. You don t need to change your clothes as long as the clothing on your chest doesn t contain metal. But, you do need to be able to hold your breath for at least 6 seconds during the exam.    What is the goal of lung cancer screening?  The goal of lung cancer screening is to save lives. Many times, lung cancer is not found until a person starts having physical symptoms. Lung cancer screening can help detect lung cancer in the earliest stages when it may be easier to treat.    Who should be screened for lung cancer?  We suggest lung cancer screening for anyone who is at high-risk for lung cancer. You are in the high-risk group if you:      are between the ages of 55 and 79, and    have smoked at least 1 pack of cigarettes a day for 20 or more years, and    still smoke or have quit within the past 15 years.    However, if you have a new cough or shortness of breath, you should talk to your doctor before being screened.    Why does it matter if I have symptoms?  Certain symptoms can be a sign that you have a condition in your lungs that should be checked and treated by your doctor. These symptoms include fever, chest pain, a new or changing cough, shortness of breath that you have never felt before, coughing up blood or unexplained weight loss. Having any of these symptoms can greatly affect the results of lung  cancer screening.       Should all smokers get an LDCT lung cancer screening exam?  It depends. Lung cancer screening is for a very specific group of men and women who have a history of heavy smoking over a long period of time (see  Who should be screened for lung cancer  above).  I am in the high-risk group, but have been diagnosed with cancer in the past. Is LDCT lung cancer screening right for me?  In some cases, you should not have LDCT lung screening, such as when your doctor is already following your cancer with CT scan studies. Your doctor will help you decide if LDCT lung screening is right for you.  Do I need to have a screening exam every year?  Yes. If you are in the high-risk group described earlier, you should get an LDCT lung cancer screening exam every year until you are 79, or are no longer willing or able to undergo screening and possible procedures to diagnose and treat lung cancer.  How effective is LDCT at preventing death from lung cancer?  Studies have shown that LDCT lung cancer screening can lower the risk of death from lung cancer by 20 percent in people who are at high-risk.  What are the risks?  There are some risks and limitations of LDCT lung cancer screening. We want to make sure you understand the risks and benefits, so please let us know if you have any questions. Your doctor may want to talk with you more about these risks.    Radiation exposure: As with any exam that uses radiation, there is a very small increased risk of cancer. The amount of radiation in LDCT is small--about the same amount a person would get from a mammogram. Your doctor orders the exam when he or she feels the potential benefits outweigh the risks.    False negatives: No test is perfect, including LDCT. It is possible that you may have a medical condition, including lung cancer, that is not found during your exam. This is called a false negative result.    False positives and more testing: LDCT very often finds  something in the lung that could be cancer, but in fact is not. This is called a false positive result. False positive tests often cause anxiety. To make sure these findings are not cancer, you may need to have more tests. These tests will be done only if you give us permission. Sometimes patients need a treatment that can have side effects, such as a biopsy. For more information on false positives, see  What can I expect from the results?     Findings not related to lung cancer: Your LDCT exam also takes pictures of areas of your body next to your lungs. In a very small number of cases, the CT scan will show an abnormal finding in one of these areas, such as your kidneys, adrenal glands, liver or thyroid. This finding may not be serious, but you may need more tests. Your doctor can help you decide what other tests you may need, if any.  What can I expect from the results?  About 1 out of 4 LDCT exams will find something that may need more tests. Most of the time, these findings are lung nodules. Lung nodules are very small collections of tissue in the lung. These nodules are very common, and the vast majority--more than 97 percent--are not cancer (benign). Most are normal lymph nodes or small areas of scarring from past infections.  But, if a small lung nodule is found to be cancer, the cancer can be cured more than 90 percent of the time. To know if the nodule is cancer, we may need to get more images before your next yearly screening exam. If the nodule has suspicious features (for example, it is large, has an odd shape or grows over time), we will refer you to a specialist for further testing.  Will my doctor also get the results?  Yes. Your doctor will get a copy of your results.  Is it okay to keep smoking now that there s a cancer screening exam?  No. Tobacco is one of the strongest cancer-causing agents. It causes not only lung cancer, but other cancers and cardiovascular (heart) diseases as well. The damage  caused by smoking builds over time. This means that the longer you smoke, the higher your risk of disease. While it is never too late to quit, the sooner you quit, the better.  Where can I find help to quit smoking?  The best way to prevent lung cancer is to stop smoking. If you have already quit smoking, congratulations and keep it up! For help on quitting smoking, please call [employer] at 3-176-QUITNOW (1-744.170.4568) or the American [employer] Society at 1-527.670.6883 to find local resources near you.  One-on-one health coaching:  If you d prefer to work individually with a health care provider on tobacco cessation, we offer:      Medication Therapy Management:  Our specially trained pharmacists work closely with you and your doctor to help you quit smoking.  Call [phone] or [phone] (toll free).

## 2022-07-18 ENCOUNTER — TELEPHONE (OUTPATIENT)
Dept: EDUCATION SERVICES | Facility: OTHER | Age: 57
End: 2022-07-18

## 2022-07-18 NOTE — TELEPHONE ENCOUNTER
----- Message from BLAIR Caruso CNP sent at 7/18/2022  8:43 AM CDT -----  Good morning!    Can you get this patient on my schedule, next week. (Appt: insulin pump download)       Thank you     Courtney

## 2022-07-19 ENCOUNTER — HOSPITAL ENCOUNTER (OUTPATIENT)
Dept: CT IMAGING | Facility: HOSPITAL | Age: 57
Discharge: HOME | End: 2022-07-19
Attending: NURSE PRACTITIONER | Admitting: NURSE PRACTITIONER
Payer: COMMERCIAL

## 2022-07-19 ENCOUNTER — TELEPHONE (OUTPATIENT)
Dept: FAMILY MEDICINE | Facility: OTHER | Age: 57
End: 2022-07-19

## 2022-07-19 DIAGNOSIS — Z87.891 PERSONAL HISTORY OF TOBACCO USE: ICD-10-CM

## 2022-07-19 DIAGNOSIS — E11.9 TYPE 2 DIABETES, HBA1C GOAL < 7% (H): ICD-10-CM

## 2022-07-19 PROCEDURE — 71271 CT THORAX LUNG CANCER SCR C-: CPT

## 2022-07-19 NOTE — TELEPHONE ENCOUNTER
Molly Cedeno  [phone]    Verbal order to continue skilled nursing.    Order given    Nina Santana RN

## 2022-07-21 RX ORDER — EMPAGLIFLOZIN 25 MG/1
TABLET, FILM COATED ORAL
Qty: 30 TABLET | Refills: 0 | Status: SHIPPED | OUTPATIENT
Start: 2022-07-21 | End: 2022-08-23

## 2022-07-21 NOTE — TELEPHONE ENCOUNTER
Empagliflozin      Last Written Prescription Date:  4/20/22  Last Fill Quantity: 30,   # refills: 3  Last Office Visit: 7/15/22  Future Office visit:    Next 5 appointments (look out 90 days)    Oct 17, 2022  9:40 AM  (Arrive by 9:25 AM)  Office Visit with Amberly Whyte NP  [facility] [place] ([facility] [place] ) [address]  [place] [address]  [phone]

## 2022-07-25 DIAGNOSIS — E55.9 VITAMIN D DEFICIENCY: ICD-10-CM

## 2022-07-26 RX ORDER — ACETAMINOPHEN 160 MG
TABLET,DISINTEGRATING ORAL
Qty: 90 CAPSULE | Refills: 3 | Status: SHIPPED | OUTPATIENT
Start: 2022-07-26 | End: 2023-07-26

## 2022-07-27 ENCOUNTER — ALLIED HEALTH/NURSE VISIT (OUTPATIENT)
Dept: EDUCATION SERVICES | Facility: OTHER | Age: 57
End: 2022-07-27
Attending: NURSE PRACTITIONER
Payer: COMMERCIAL

## 2022-07-27 VITALS
OXYGEN SATURATION: 92 % | SYSTOLIC BLOOD PRESSURE: 116 MMHG | BODY MASS INDEX: 36.02 KG/M2 | DIASTOLIC BLOOD PRESSURE: 74 MMHG | HEIGHT: 61 IN | RESPIRATION RATE: 16 BRPM | HEART RATE: 80 BPM | WEIGHT: 190.8 LBS

## 2022-07-27 DIAGNOSIS — Z79.4 TYPE 2 DIABETES MELLITUS WITH HYPERGLYCEMIA, WITH LONG-TERM CURRENT USE OF INSULIN (H): Primary | ICD-10-CM

## 2022-07-27 DIAGNOSIS — E11.65 TYPE 2 DIABETES MELLITUS WITH HYPERGLYCEMIA, WITH LONG-TERM CURRENT USE OF INSULIN (H): Primary | ICD-10-CM

## 2022-07-27 DIAGNOSIS — F17.200 NICOTINE DEPENDENCE, UNCOMPLICATED, UNSPECIFIED NICOTINE PRODUCT TYPE: ICD-10-CM

## 2022-07-27 PROCEDURE — 99214 OFFICE O/P EST MOD 30 MIN: CPT | Performed by: NURSE PRACTITIONER

## 2022-07-27 PROCEDURE — G0463 HOSPITAL OUTPT CLINIC VISIT: HCPCS

## 2022-07-27 ASSESSMENT — PAIN SCALES - GENERAL: PAINLEVEL: NO PAIN (0)

## 2022-07-27 NOTE — PATIENT INSTRUCTIONS
Continue working on healthy eating and moving (start low and slow, work up to 30 min, 5x/week)    BG goals:  Fasting and before meals <130, >70  2 hour after eating <180    We only need 1/2 of these numbers to be within target then your A1c will be within target    Medication changes   Keep working on entering bolus with every meal.   The present boluses as discussed  Small meals--breakfast and lunch  Medium meal--supper    Please start the Freestyle Bud 2--if you need help, let me know    Follow up   1 month    Call me sooner if any problems/concerns and/or questions develop including consistent low BGs <70 or consistent high BGs >200  [phone] (Unit Coordinator)    [phone] (Anna, Nurse)    Smoking:   Try keep working on reducing the amount of cigarettes

## 2022-07-27 NOTE — PROGRESS NOTES
"Chief Complaint   Patient presents with     Diabetes       Initial There were no vitals taken for this visit. Estimated body mass index is 32.44 kg/m  as calculated from the following:    Height as of 7/15/22: 1.626 m (5' 4\").    Weight as of 7/15/22: 85.7 kg (189 lb).  Medication Reconciliation: complete  Gege Walter LPN    "

## 2022-07-27 NOTE — PROGRESS NOTES
SUBJECTIVE:  Marly Cannon, 56 year old, female presents with the following Chief Complaint(s) with HPI to follow:  Chief Complaint   Patient presents with     Diabetes        Diabetes Follow-up    Patient is checking blood sugars: 0-2x/day.  Results:    Date: 7/13 to 7/27/22    Average glucose: 198  Highest: 286  Lowest: 118      Symptoms of hypoglycemia (low blood sugar): no    Paresthesias (numbness or burning in feet) or sores: no, no sores    Diabetic eye exam within the last year: UTD    Breakfast eaten regularly: most of the time    Patient counting carbs: Yes    Exercising: depends on the weather          HPI:  Marly's here today for the follow up regarding her Diabetes mellitus, Type 2.    Lab Results   Component Value Date    A1C 10.4 07/15/2022    A1C 10.8 04/15/2022    A1C 8.6 01/14/2022    A1C 9.8 08/12/2021    A1C >14.0 05/10/2021    A1C 10.7 02/10/2021    A1C 7.6 11/09/2020    A1C 7.9 08/07/2020    A1C 8.1 05/04/2020     Current Diabetes medication:   1. Omnipod dash, using Novolog   2. Jardiance 25 mg daily  Statin use: yes, simvastatin 20 mg daily  ASA: yes, 81 mg daily    Marly's here for a meter download and possible diabetic medication adjustments.   Reports the following:  Denies any issues with the Omnipod Dash  However, sometimes the tape starts lifting.    Hasn't been using her Dexcom G6 as it fell off early.  Noted A1c     Denies any new health concerns.    Weight trend   Wt Readings from Last 4 Encounters:   07/27/22 86.5 kg (190 lb 12.8 oz)   07/15/22 85.7 kg (189 lb)   04/28/22 87.5 kg (192 lb 14.4 oz)   04/15/22 88.5 kg (195 lb)     States her Slow-mag is costing $25     Patient Active Problem List   Diagnosis     Type 2 diabetes, HbA1c goal < 7% (H)     ACP (advance care planning)     Stage 3 chronic kidney disease (H)     Pulmonary emphysema (H)     Hyperparathyroidism due to renal insufficiency (H)     Vitamin D deficiency     Intellectual disability     Breast fibrocystic  disorder     Tobacco abuse     Microalbuminuria due to type 2 diabetes mellitus (H)     H/O colonoscopy     [place] cardiac risk <10% in next 10 years     Tubular adenoma of colon     Hyperlipidemia, unspecified hyperlipidemia type     Essential hypertension     Callus of foot     Memory disturbance     ADEEL (obstructive sleep apnea)     Tremor       History reviewed. No pertinent past medical history.    Past Surgical History:   Procedure Laterality Date     COLONOSCOPY N/A 8/20/2015    Procedure: COLONOSCOPY;  Surgeon: Gentry Porter MD;  Location: [facility]     COLONOSCOPY N/A 9/21/2018    Procedure: COLONOSCOPY;  COLONOSCOPY WITH POLYPECTOMY;  Surgeon: Gentry Porter MD;  Location: [facility]       Family History   Problem Relation Age of Onset     Diabetes Mother      Diabetes Father      Hypertension Brother      Diabetes Sister        Social History     Tobacco Use     Smoking status: Current Every Day Smoker     Packs/day: 1.00     Years: 34.00     Pack years: 34.00     Types: Cigarettes     Start date: 1/1/1979     Smokeless tobacco: Never Used     Tobacco comment: Declined QP 05/13/2020   Substance Use Topics     Alcohol use: No     Alcohol/week: 0.0 standard drinks       Current Outpatient Medications   Medication Sig Dispense Refill     Acetaminophen (TYLENOL PO) Take 325 mg by mouth every 6 hours as needed        amLODIPine (NORVASC) 5 MG tablet TAKE 1 TABLET BY MOUTH DAILY 90 tablet 1     ammonium lactate (AMLACTIN) 12 % external cream Apply topically 2 times daily       aspirin (ASPIRIN LOW DOSE) 81 MG chewable tablet CHEW 1 TABLET BY MOUTH DAILY. DO NOT SPLIT OR CRUSH 90 tablet 0     atorvastatin (LIPITOR) 40 MG tablet TAKE 1 TABLET BY MOUTH AT BEDTIME 90 tablet 1     budesonide-formoterol (SYMBICORT) 160-4.5 MCG/ACT Inhaler Inhale 2 puffs into the lungs 2 times daily 1 Inhaler 3     Cholecalciferol (VITAMIN D3) 50 MCG (2000 UT) CAPS TAKE 1 CAPSULE BY MOUTH DAILY 90 capsule 3     COMBIVENT  RESPIMAT  MCG/ACT inhaler INHALE 1 PUFF INTO THE LUNGS 4 TIMES DAILY 4 g 2     Continuous Blood Gluc  (FREESTYLE HANK 2 READER) AISHA 1 each once for 1 dose Use to read blood sugars per 's instructions. 1 each 0     Continuous Blood Gluc Sensor (FREESTYLE HANK 2 SENSOR) MISC 1 each every 14 days Use 1 sensor every 14 days. Use to read blood sugars per 's instructions. 2 each 5     docusate sodium (COLACE) 100 MG capsule TAKE 1 CAPSULE BY MOUTH DAILY 90 capsule 1     hydrocortisone (CORTAID) 1 % external cream Apply topically 2 times daily 453.6 g 0     hydrocortisone 2.5 % cream Apply topically 2 times daily       insulin aspart (NOVOLOG VIAL) 100 UNITS/ML vial To be used with the V40.  TDD: 76 units 40 mL 3     Insulin Disposable Pump (OMNIPOD DASH 5 PACK PODS) MISC 1 pod every 48 hours 15 each 5     insulin pen needle (32G X 4 MM) 32G X 4 MM miscellaneous Use 1 pen needles daily 100 each 3     INSULIN PUMP - OUTPATIENT Updated: 7/27/22  Pump: Omnipod Dash  Basal: 0.8  Total: 19.2  CR: 12  ISF: 45  Target: 100  Active insulin time: 4       JARDIANCE 25 MG TABS tablet TAKE 1 TABLET BY MOUTH DAILY 30 tablet 0     lisinopril (ZESTRIL) 40 MG tablet TAKE 1 TABLET BY MOUTH DAILY 90 tablet 1     Magnesium Cl-Calcium Carbonate 71.5-119 MG TBEC Take 2 tablets by mouth       nystatin (MYCOSTATIN) 284521 UNIT/GM external powder Apply topically 3 times daily 60 g 3     OneTouch Delica Lancets 33G MISC Inject 1 each Subcutaneous 3 times daily (before meals) 100 each 11     ONETOUCH ULTRA test strip USE TO TEST BLOOD SUGAR 4 TIMES DAILY 100 strip 1     order for DME Women briefs 50 each 1     polyethylene glycol (MIRALAX) 17 g packet Take 17 g by mouth 3 times daily       primidone (MYSOLINE) 50 MG tablet TAKE 1 TABLET BY MOUTH AT BEDTIME 30 tablet 11     tiZANidine (ZANAFLEX) 4 MG tablet Take 1 tablet (4 mg) by mouth 3 times daily as needed for muscle spasms 30 tablet 0     triamcinolone  "(KENALOG) 0.1 % external cream USE AS DIRECTED 80 g 0     urea (GORDONS UREA) 40 % external ointment Apply topically 2 times daily 30 g 0     wearable insulin delivery device (V-GO 40) kit Insulin delivery device to be changed every 24 hours 30 each 4       No Known Allergies    REVIEW OF SYSTEMS  Skin: negative  Eyes: negative, wears glasses    Ears/Nose/Throat: negative  Respiratory: No shortness of breath or hemoptysis; smoker's cough; hx of sleep apnea  Cardiovascular: negative  Gastrointestinal: negative  Genitourinary: negative  Musculoskeletal: negative; hx of left shoulder pain and left knee--depends on the weather   Neurologic: negative  Psychiatric: negative  Hematologic/Lymphatic/Immunologic: negative  Endocrine: positive for diabetes     OBJECTIVE:  /74   Pulse 80   Resp 16   Ht 1.549 m (5' 1\")   Wt 86.5 kg (190 lb 12.8 oz)   SpO2 92%   BMI 36.05 kg/m    Constitutional: alert and no distress  Respiratory:  Good diaphragmatic excursion.   Musculoskeletal: extremities normal- no gross deformities noted and gait normal  Skin: no suspicious lesions or rashes to visible skin  Psychiatric: mentation appears normal and affect normal/bright      LABS  No results found for any visits on 07/27/22.    ASSESSMENT / PLAN:  (E11.65,  Z79.4) Type 2 diabetes mellitus with hyperglycemia, with long-term current use of insulin (H)  (primary encounter diagnosis)  Comment: noted A1c and insulin pump information    Insulin:  Updated: 7/27/22  Pump: Omnipod Dash  Basal: 0.8  Total: 19.2  CR: 12  ISF: 45  Target: 100  Active insulin time: 4    Plan: Continuous Blood Gluc  (FREESTYLE HANK        2 READER) AISHA Continuous Blood Gluc Sensor         (FREESTYLE HANK 2 SENSOR) MISC          Presets added to her bolus feature  Education focused on this  Highly encourage her to use them    We talked about trying another personal CGM  She's willing  Order sent    Aware to call if she needs help setting it up.  "     Will talk to Amberly Whyte NP about her slow-mag medication     (F17.200) Nicotine dependence, uncomplicated, unspecified nicotine product type  Comment: noted  Plan:   Spoke with patient regarding options for smoking cessation.  Various pharmacologic options and behavioral techniques were reviewed.  The relative effectiveness as well as risks and benefits were reviewed with patient.  Patient is interested in: No interventions    Smoking Cessation    Marly's aware to let us know when she's ready to quit smoking.  Discussed the dangers of smoking with diabetes    Follow up  1 month     Call sooner if any issues    Patient Instructions     Continue working on healthy eating and moving (start low and slow, work up to 30 min, 5x/week)    BG goals:  Fasting and before meals <130, >70  2 hour after eating <180    We only need 1/2 of these numbers to be within target then your A1c will be within target    Medication changes   Keep working on entering bolus with every meal.   The present boluses as discussed  Small meals--breakfast and lunch  Medium meal--supper    Please start the Freestyle Bud 2--if you need help, let me know    Follow up   1 month    Call me sooner if any problems/concerns and/or questions develop including consistent low BGs <70 or consistent high BGs >200  [phone] (Unit Coordinator)    [phone] (Anna, Nurse)    Smoking:   Try keep working on reducing the amount of cigarettes                 Time: 30 minutes  Barrier: see [facility]  Willingness to learn: accepting    Courtney [facility]  Diabetes and Wound Care    Cc: Amberly Whyte NP    30 minutes was spent with patient.    All of this time was spent on counseling patient regarding illness, medication and/or treatment options, coordinating further cares and follow ups that are needed along with resource material that will be helpful in the treatment of these issues.       With the electronic record, we can now more quickly and easily  track our patient diabetic goals. Our diabetes clinical review is in progress and these are the indicators we are monitoring for good diabetes health.     1.) HbA1C less than 7 (measurement of your average blood sugars)  2.) Blood Pressure less than 140/80  3.) LDL less than 100 (bad cholesterol)  4.) HbA1C is checked in the last 6 months and below 7% (more frequently if not at goal or adjusting medications)  5.) LDL is checked in the last 12 months (more frequently if not at goal or adjusting medications)  6.) Taking one baby aspirin daily (unless otherwise instructed)  7.) No tobacco use  8) Statin use     You have achieved 6 out of 8 of these and I am encouraging you to come in and get tuned up to achieve 8 out of 8!  Here is what you have achieved so far in my goals for you:  1.) HbA1C  less than 7:                              NO  Your last  HbA1C :  Lab Results   Component Value Date    A1C 10.4 07/15/2022    A1C 10.8 04/15/2022    A1C 8.6 01/14/2022    A1C 9.8 08/12/2021    A1C >14.0 05/10/2021    A1C 10.7 02/10/2021    A1C 7.6 11/09/2020    A1C 7.9 08/07/2020    A1C 8.1 05/04/2020     2.) Blood Pressure less than 140/80:       YES      Your last    BP Readings from Last 1 Encounters:   07/27/22 116/74     3.) LDL less than 100:                              YES      Your last     LDL Cholesterol Calculated   Date Value Ref Range Status   07/15/2022 66 <=100 mg/dL Final   11/09/2020 74 <100 mg/dL Final     Comment:     Desirable:       <100 mg/dl     4.) Checked HbA1C in the past 6 months: YES      5.) Checked LDL in the past 12 months:    YES      6.) Taking one aspirin daily:                       YES     7.) No tobacco use:                                        NO   8.) Statin use      YES

## 2022-08-02 ENCOUNTER — TELEPHONE (OUTPATIENT)
Dept: FAMILY MEDICINE | Facility: OTHER | Age: 57
End: 2022-08-02

## 2022-08-02 DIAGNOSIS — N18.30 STAGE 3 CHRONIC KIDNEY DISEASE, UNSPECIFIED WHETHER STAGE 3A OR 3B CKD (H): Primary | ICD-10-CM

## 2022-08-02 NOTE — TELEPHONE ENCOUNTER
"----- Message from Amberly Whyte NP sent at 7/31/2022  2:40 PM CDT -----  Can you have her stop the magnesium and discontinue from med list. Will you add \"mag level\" to next appointment notes. Amberly  ----- Message -----  From: Courtney Chaudhary APRN CNP  Sent: 7/27/2022   5:06 PM CDT  To: Amberly Whyte NP    Hey!    Couple things  1.  Diabetes recap  Marly hasn't been wearing her Dexcom G6 for awhile--states it kept falling off.   I encouraged her to try the Freestyle Bud 2--she's willing and it was ordered.    She's not checking her BGs (0-2x/day) and not adding a bolus when eating--probably explains her high A1c    We discussed this today    2.  Slow-mag supplement  Marly is wondering if she should continue with this.   The pharmacy is charging $25 for it.   She can't afford it.        Let me know what you want me to do--check labs?   There's a mag level that was below target (8 years ago) and one above target (last October ordered by Dr. Eason).       Thank you   Courtney"

## 2022-08-02 NOTE — TELEPHONE ENCOUNTER
Spoke with patient . She was informed to stop the magnesium and we will recheck her labs at her next office visit. Lab appointment has been made prior to her October 17 th office visit .

## 2022-08-08 ENCOUNTER — HOSPITAL ENCOUNTER (OUTPATIENT)
Dept: EDUCATION SERVICES | Facility: HOSPITAL | Age: 57
Discharge: HOME | End: 2022-08-08
Attending: NURSE PRACTITIONER | Admitting: NURSE PRACTITIONER
Payer: COMMERCIAL

## 2022-08-08 VITALS
HEIGHT: 64 IN | RESPIRATION RATE: 16 BRPM | SYSTOLIC BLOOD PRESSURE: 144 MMHG | OXYGEN SATURATION: 94 % | DIASTOLIC BLOOD PRESSURE: 91 MMHG | BODY MASS INDEX: 33.24 KG/M2 | HEART RATE: 75 BPM | WEIGHT: 194.7 LBS

## 2022-08-08 DIAGNOSIS — Z79.4 TYPE 2 DIABETES MELLITUS WITH HYPERGLYCEMIA, WITH LONG-TERM CURRENT USE OF INSULIN (H): Primary | ICD-10-CM

## 2022-08-08 DIAGNOSIS — E11.65 TYPE 2 DIABETES MELLITUS WITH HYPERGLYCEMIA, WITH LONG-TERM CURRENT USE OF INSULIN (H): Primary | ICD-10-CM

## 2022-08-08 PROCEDURE — G0108 DIAB MANAGE TRN  PER INDIV: HCPCS

## 2022-08-08 ASSESSMENT — PAIN SCALES - GENERAL: PAINLEVEL: NO PAIN (0)

## 2022-08-08 NOTE — PATIENT INSTRUCTIONS
Call with any questions/concerns: [phone]    Return on Monday, 8/22/22 at 1:00 pm - bring sensor with you.

## 2022-08-09 ENCOUNTER — TELEPHONE (OUTPATIENT)
Dept: FAMILY MEDICINE | Facility: OTHER | Age: 57
End: 2022-08-09

## 2022-08-09 ENCOUNTER — TELEPHONE (OUTPATIENT)
Dept: EDUCATION SERVICES | Facility: HOSPITAL | Age: 57
End: 2022-08-09

## 2022-08-09 ENCOUNTER — TELEPHONE (OUTPATIENT)
Dept: MAMMOGRAPHY | Facility: OTHER | Age: 57
End: 2022-08-09

## 2022-08-09 ENCOUNTER — ANCILLARY PROCEDURE (OUTPATIENT)
Dept: MAMMOGRAPHY | Facility: OTHER | Age: 57
End: 2022-08-09
Attending: NURSE PRACTITIONER
Payer: COMMERCIAL

## 2022-08-09 DIAGNOSIS — Z12.31 VISIT FOR SCREENING MAMMOGRAM: ICD-10-CM

## 2022-08-09 PROCEDURE — 77067 SCR MAMMO BI INCL CAD: CPT | Mod: TC

## 2022-08-09 NOTE — TELEPHONE ENCOUNTER
Myesha from [facility] called she is at pt's home setting up meds and pt states that her Jardiance was discontinued at appt with Kat Dawn yesterday. She would like to verify this as the triage nurse was not able to find any record of this.

## 2022-08-09 NOTE — TELEPHONE ENCOUNTER
Called Myesha and let her know that I did not do anything with Marly's meds so she should still be taking her Jardiance.

## 2022-08-12 NOTE — PROGRESS NOTES
"Diabetes Self-Management Education & Support    Presents for: Personal CGM Start    SUBJECTIVE/OBJECTIVE:  Presents for: Personal CGM Start  Accompanied by: Self  Diabetes education in the past 24mo: Yes  Focus of Visit: Monitoring, Taking Medication  Diabetes type: Type 2  Disease course: Getting harder to manage (No change)  How confident are you filling out medical forms by yourself:: Not at all  Transportation concerns: No  Difficulty affording diabetes medication?: No  Difficulty affording diabetes testing supplies?: No  Other concerns:: Glasses, Cognitive impairment  Cultural Influences/Ethnic Background:  Not  or     Diabetes Symptoms & Complications:  Fatigue: No  Neuropathy: No  Polydipsia: No  Polyphagia: No  Polyuria: No  Visual change: No  Slow healing wounds: No  Symptom course: Stable  Autonomic neuropathy: No  CVA: No  Heart disease: No  Nephropathy: Yes  Peripheral neuropathy: No  Foot ulcerations: No  Peripheral Vascular Disease: No  Retinopathy: No  Sexual dysfunction: No    Patient Problem List and Family Medical History reviewed for relevant medical history, current medical status, and diabetes risk factors.    Vitals:  /91   Pulse 75   Resp 16   Ht 1.626 m (5' 4\")   Wt 88.3 kg (194 lb 11.2 oz)   SpO2 94%   BMI 33.42 kg/m    Estimated body mass index is 33.42 kg/m  as calculated from the following:    Height as of this encounter: 1.626 m (5' 4\").    Weight as of this encounter: 88.3 kg (194 lb 11.2 oz).   Last 3 BP:   BP Readings from Last 3 Encounters:   08/08/22 144/91   07/27/22 116/74   07/15/22 110/74       History   Smoking Status     Current Every Day Smoker     Packs/day: 1.00     Years: 34.00     Types: Cigarettes     Start date: 1/1/1979   Smokeless Tobacco     Never Used     Comment: Declined QP 05/13/2020       Labs:  Lab Results   Component Value Date    A1C 10.4 07/15/2022    A1C >14.0 05/10/2021     Lab Results   Component Value Date     07/15/2022 "     06/23/2021     Lab Results   Component Value Date    LDL 66 07/15/2022    LDL 74 11/09/2020     HDL Cholesterol   Date Value Ref Range Status   11/09/2020 53 >49 mg/dL Final     Direct Measure HDL   Date Value Ref Range Status   07/15/2022 50 >=50 mg/dL Final   ]  GFR Estimate   Date Value Ref Range Status   07/15/2022 58 (L) >60 mL/min/1.73m2 Final     Comment:     Effective December 21, 2021 eGFRcr in adults is calculated using the 2021 CKD-EPI creatinine equation which includes age and gender (Rosalie et al., NEJM, DOI: 10.1056/SMKYzi2764361)   06/23/2021 57 (L) >60 mL/min/[1.73_m2] Final     Comment:     Non  GFR Calc  Starting 12/18/2018, serum creatinine based estimated GFR (eGFR) will be   calculated using the Chronic Kidney Disease Epidemiology Collaboration   (CKD-EPI) equation.       GFR Estimate If Black   Date Value Ref Range Status   06/23/2021 66 >60 mL/min/[1.73_m2] Final     Comment:      GFR Calc  Starting 12/18/2018, serum creatinine based estimated GFR (eGFR) will be   calculated using the Chronic Kidney Disease Epidemiology Collaboration   (CKD-EPI) equation.       Lab Results   Component Value Date    CR 1.10 07/15/2022    CR 1.07 06/23/2021     No results found for: MICROALBUMIN    Healthy Eating:  Healthy Eating Assessed Today: No  Cultural/Caodaism diet restrictions?: No  Meal planning/habits: None  Meals include: Breakfast, Dinner  Beverages: Water, Coffee, Diet soda (Crystal light)  Has patient met with a dietitian in the past?: Yes    Being Active:  Being Active Assessed Today: Yes  Exercise:: Yes  Days per week of moderate to strenuous exercise (like a brisk walk): 1  On average, minutes per day of exercise at this level: 60  How intense was your typical exercise? : Moderate (like brisk walking)  Exercise Minutes per Week: 60  Barrier to exercise: Access    Monitoring:  Monitoring Assessed Today: Yes  Did patient bring glucose meter to  appointment? : Yes      Taking Medications:  Diabetes Medication(s)     Insulin       insulin aspart (NOVOLOG VIAL) 100 UNITS/ML vial    To be used with the V40.  TDD: 76 units    Sodium-Glucose Co-Transporter 2 (SGLT2) Inhibitors       JARDIANCE 25 MG TABS tablet    TAKE 1 TABLET BY MOUTH DAILY     Patient not taking: Reported on 8/8/2022          Taking Medication Assessed Today: Yes  Current Treatments: Insulin Pump, Oral Medication (taken by mouth) (Taking Jardiance)  Given by: Patient  Injection/Infusion sites: Abdomen  Problems taking diabetes medications regularly?: Yes (concerns about taking insulin correctly)  Diabetes medication side effects?: No    Problem Solving:  Problem Solving Assessed Today: Yes  Hypoglycemia Frequency: Never  Patient carries a carbohydrate source: Yes    Hypoglycemia symptoms  Confusion: No  Dizziness or Light-Headedness: No  Headaches: No  Hunger: No  Mood changes: No  Nervousness/Anxiety: No  Sleepiness: No  Speech difficulty: No  Sweats: No  Feeling shaky: No    Hypoglycemia Complications  Blackouts: No  Hospitalization: No  Nocturnal hypoglycemia: No  Required assistance: No  Required glucagon injection: No  Seizures: No    Reducing Risks:  Diabetes Risks: Age over 45 years, Hyperlipidemia  CAD Risks: Diabetes Mellitus, Dyslipidemia, Hypertension, Tobacco exposure, Obesity  Has dilated eye exam at least once a year?: Yes  Feet checked by healthcare provider in the last year?: Yes    Healthy Coping:  Healthy Coping Assessed Today: Yes  Emotional response to diabetes: Acceptance  Informal Support system:: Significant other  Stage of change: MAINTENANCE (Working to maintain change, with risk of relapse)  Support resources: In-person Offerings, Exercise  Patient Activation Measure Survey Score:  SHADI Score (Last Two) 6/19/2018 4/24/2019   SHADI Raw Score 28 30   Activation Score 50 56   SHADI Level 2 3       Diabetes knowledge and skills assessment:   Patient is knowledgeable in  diabetes management concepts related to: Monitoring and Taking Medication    Patient needs further education on the following diabetes management concepts: Monitoring and Taking Medication    Based on learning assessment above, most appropriate setting for further diabetes education would be: Individual setting.      INTERVENTIONS:         Sensor was inserted with no resistance or bleeding at insertion site.    Education provided today on:  AADE Self-Care Behaviors:  Monitoring: log and interpret results, individual blood glucose targets and frequency of monitoring    CGM-specific education:   Freestyle Bud sensor: insertion technique, sensor site location and rotation, insulin administration in relation to sensor placement, sensor wear, reasons to remove sensor (MRI, CT, diathermy), Vitamin C & Aspirin effects on sensor and Bud [place]: frequency of scanning sensor, length of time data is visible, use of built in glucose meter, Precision X-tra test strips       Education Materials Provided:  Sensor overpatches    ASSESSMENT:  Marly is here to start her Libre2 CGM. Set up alarms and instructed her how to check her BG with the reader. Had her do a could of checks even though sensor was still in warm up.    Talked Marly through the process of prepping arm, getting  ready and inserting the sensor. Did place an overpatch for her to try.    Marly would like to come back again for supervision with a sensor insertion and CGM download.     Pt verbalized understanding of concepts discussed and recommendations provided today.    PLAN  See Patient Instructions for co-developed, patient-stated behavior change goals.  Return to Diabetes Ed on 8/22/22 at 1:00 pm  AVS printed and provided to patient today. See Follow-Up section for recommended follow-up.      Time Spent: 45 minutes  Encounter Type: Individual    Any diabetes medication dose changes were made via the [facility] Protocol and Collaborative Practice  Agreement with the patient's primary care provider. A copy of this encounter was shared with the provider.

## 2022-08-22 ENCOUNTER — HOSPITAL ENCOUNTER (OUTPATIENT)
Dept: EDUCATION SERVICES | Facility: HOSPITAL | Age: 57
Discharge: HOME | End: 2022-08-22
Attending: NURSE PRACTITIONER | Admitting: NURSE PRACTITIONER
Payer: COMMERCIAL

## 2022-08-22 VITALS
HEIGHT: 64 IN | DIASTOLIC BLOOD PRESSURE: 78 MMHG | BODY MASS INDEX: 32.98 KG/M2 | OXYGEN SATURATION: 92 % | WEIGHT: 193.2 LBS | HEART RATE: 89 BPM | SYSTOLIC BLOOD PRESSURE: 116 MMHG | RESPIRATION RATE: 16 BRPM

## 2022-08-22 DIAGNOSIS — E11.9 TYPE 2 DIABETES, HBA1C GOAL < 7% (H): ICD-10-CM

## 2022-08-22 DIAGNOSIS — Z53.9 PERSONS ENCOUNTERING HEALTH SERVICES FOR SPECIFIC PROCEDURES, NOT CARRIED OUT: Primary | ICD-10-CM

## 2022-08-22 PROCEDURE — G0108 DIAB MANAGE TRN  PER INDIV: HCPCS

## 2022-08-22 ASSESSMENT — PAIN SCALES - GENERAL: PAINLEVEL: NO PAIN (0)

## 2022-08-22 NOTE — PROGRESS NOTES
Diabetes Self-Management Education & Support    Presents for: Supervision of personal CGM sensor replacement.      CDE VISIT MODE: In Person    Assessment Type:      Sensor was inserted with no resistance or bleeding at insertion site.  Patient does have some trouble with opening the sensor applicator and pushing sensor applicator onto skin.      CGM-specific education:   Freestyle Bud sensor: insertion technique, sensor site location and rotation, insulin administration in relation to sensor placement, sensor wear, reasons to remove sensor (MRI, CT, diathermy), Vitamin C & Aspirin effects on sensor and Bud [place]: frequency of scanning sensor, length of time data is visible, use of built in glucose meter, Precision X-tra test strips         ASSESSMENT:    Marly into clinic today for supervision with personal CGM replacement. Pt has difficulty opening applicator and pushing applicator onto skin. Pt cleansed site with alcohol, dried. Pushed sensor applicator. No bleeding noted. Covered with sensor cover.     Reviewed CGM report from 2 weeks prior.   Pt elevated readings, including above 400. Average 268. Discussed meals and carbohydrates.Pt shares she does eat a lot of rice in the afternoon hours. Reviewed portions and carbohydrate recommendations.     Freestyle Bud AGP Report  8/9/2022 to 8/22/2022    % Time CGM is Active 73%    Average Glucose  268    Glucose Management Indicator  (GMI)    9.7    Glucose Variabilty  33.8%    Time in Ranges:  >250 mg/dL   49%  181-250 mg/dL  32%   mg/dL   19%  54-69 mg/dL   0%  <54 mg/dL   0%      Pt verbalized understanding of concepts discussed and recommendations provided today.    Continue education with the following diabetes management concepts: Being Active and Monitoring      PLAN    Pt due to replace sensor in 2 weeks 9/5 (Labor Day Holiday).   Pt has someone at home who can help with next sensor replacement.     Topics to cover at upcoming visits: Being  "Active and Taking Medication    Follow-up:   Copy of CGM report to GRISEL Chaudhary.  RN will follow up via telephone 9/6/22.   GRISEL Chaudhary 9/7/2022.     See Care Plan for co-developed, patient-state behavior change goals.  AVS provided for patient today.    Education Materials Provided:  Carbohydrate Counting and My Food Plan    SUBJECTIVE/OBJECTIVE:  Presents for: CGM Review  Accompanied by: Self  Diabetes education in the past 24mo: Yes  Focus of Visit: CGM  Type of CGM visit: Personal CGM Follow-up  Diabetes type: Type 2  Disease course: Getting harder to manage  How confident are you filling out medical forms by yourself:: Not at all  Diabetes management related comments/concerns: high blood glucose readings  Transportation concerns: No  Difficulty affording diabetes medication?: No  Difficulty affording diabetes testing supplies?: No  Other concerns:: Cognitive impairment, Glasses  Cultural Influences/Ethnic Background:  Not  or       Diabetes Symptoms & Complications:  Fatigue: No  Neuropathy: No  Polydipsia: Sometimes  Polyphagia: No  Polyuria: No  Visual change: No  Slow healing wounds: No  Symptom course: Stable  Weight trend: Stable  Complications assessed today?: Yes  Autonomic neuropathy: No  CVA: No  Heart disease: No  Nephropathy: Yes  Peripheral neuropathy: No  Foot ulcerations: No  Peripheral Vascular Disease: No  Retinopathy: No  Sexual dysfunction: No    Patient Problem List and Family Medical History reviewed for relevant medical history, current medical status, and diabetes risk factors.    Vitals:  /78   Pulse 89   Resp 16   Ht 1.626 m (5' 4\")   Wt 87.6 kg (193 lb 3.2 oz)   SpO2 92%   BMI 33.16 kg/m    Estimated body mass index is 33.16 kg/m  as calculated from the following:    Height as of this encounter: 1.626 m (5' 4\").    Weight as of this encounter: 87.6 kg (193 lb 3.2 oz).   Last 3 BP:   BP Readings from Last 3 Encounters:   08/22/22 116/78   08/08/22 144/91 "   07/27/22 116/74       History   Smoking Status     Current Every Day Smoker     Packs/day: 1.00     Years: 34.00     Types: Cigarettes     Start date: 1/1/1979   Smokeless Tobacco     Never Used     Comment: Declined QP 05/13/2020       Labs:  Lab Results   Component Value Date    A1C 10.4 07/15/2022    A1C >14.0 05/10/2021     Lab Results   Component Value Date     07/15/2022     06/23/2021     Lab Results   Component Value Date    LDL 66 07/15/2022    LDL 74 11/09/2020     HDL Cholesterol   Date Value Ref Range Status   11/09/2020 53 >49 mg/dL Final     Direct Measure HDL   Date Value Ref Range Status   07/15/2022 50 >=50 mg/dL Final   ]  GFR Estimate   Date Value Ref Range Status   07/15/2022 58 (L) >60 mL/min/1.73m2 Final     Comment:     Effective December 21, 2021 eGFRcr in adults is calculated using the 2021 CKD-EPI creatinine equation which includes age and gender (Rosalie et al., [place], DOI: 10.1056/JBSXbv9929663)   06/23/2021 57 (L) >60 mL/min/[1.73_m2] Final     Comment:     Non  GFR Calc  Starting 12/18/2018, serum creatinine based estimated GFR (eGFR) will be   calculated using the Chronic Kidney Disease Epidemiology Collaboration   (CKD-EPI) equation.       GFR Estimate If Black   Date Value Ref Range Status   06/23/2021 66 >60 mL/min/[1.73_m2] Final     Comment:      GFR Calc  Starting 12/18/2018, serum creatinine based estimated GFR (eGFR) will be   calculated using the Chronic Kidney Disease Epidemiology Collaboration   (CKD-EPI) equation.       Lab Results   Component Value Date    CR 1.10 07/15/2022    CR 1.07 06/23/2021     No results found for: MICROALBUMIN    Healthy Eating:  Healthy Eating Assessed Today: Yes  Cultural/Zoroastrian diet restrictions?: No  Meal planning/habits: None  Meals include: Breakfast, Lunch, Dinner  Breakfast: cereal  Lunch: often skips. will make a sandwich if hungry.  Dinner: hotdish  Snacks: none  Beverages: Water, Coffee,  "Diet soda (crystal light)  Has patient met with a dietitian in the past?: Yes    Being Active:  Being Active Assessed Today: Yes  Exercise:: Yes  Days per week of moderate to strenuous exercise (like a brisk walk): 2  On average, minutes per day of exercise at this level: 30  How intense was your typical exercise? : Moderate (like brisk walking)  Exercise Minutes per Week: 60  Barrier to exercise: Access (prefers to walk when weather is nice outside.)    Monitoring:  Monitoring Assessed Today: Yes  Did patient bring glucose meter to appointment? : Yes  Blood Glucose Meter: CGM  Times checking blood sugar at home (number):  (\"only when it beeps\")  Blood glucose trend: Increasing-        Taking Medications:  Diabetes Medication(s)     Insulin       insulin aspart (NOVOLOG VIAL) 100 UNITS/ML vial    To be used with the V40.  TDD: 76 units    Sodium-Glucose Co-Transporter 2 (SGLT2) Inhibitors       JARDIANCE 25 MG TABS tablet    TAKE 1 TABLET BY MOUTH DAILY          Taking Medication Assessed Today: Yes (patient denies)  Current Treatments: Insulin Pump, Oral Medication (taken by mouth) (Jardiance)  Given by: Patient  Injection/Infusion sites: Abdomen  Problems taking diabetes medications regularly?: No  Diabetes medication side effects?: No    Problem Solving:  Problem Solving Assessed Today: Yes  Is the patient at risk for hypoglycemia?: No  Hypoglycemia Frequency: Never  Patient carries a carbohydrate source: Yes    Hypoglycemia symptoms  Confusion: No  Dizziness or Light-Headedness: No  Headaches: No  Hunger: No  Mood changes: No  Nervousness/Anxiety: No  Sleepiness: No  Speech difficulty: No  Sweats: No  Feeling shaky: No    Hypoglycemia Complications  Blackouts: No  Hospitalization: No  Nocturnal hypoglycemia: No  Required assistance: No  Required glucagon injection: No  Seizures: No    Reducing Risks:  Reducing Risks Assessed Today: Yes  Diabetes Risks: Age over 45 years, Sedentary Lifestyle  CAD Risks: Sedentary " lifestyle, Diabetes Mellitus  Has dilated eye exam at least once a year?: Yes  Feet checked by healthcare provider in the last year?: Yes    Healthy Coping:  Healthy Coping Assessed Today: Yes  Emotional response to diabetes: Unable to access  Informal Support system:: Significant other  Stage of change: MAINTENANCE (Working to maintain change, with risk of relapse)  Support resources: In-person Offerings, Exercise  Patient Activation Measure Survey Score:  SHADI Score (Last Two) 6/19/2018 4/24/2019   SHADI Raw Score 28 30   Activation Score 50 56   SHADI Level 2 3         Care Plan and Education Provided:  Supervision for CGM application.   Carbohydrate/meal planning guide.     Time Spent: 30 minutes  Encounter Type: Individual    Any diabetes medication dose changes were made via the CDE Protocol per the patient's referring provider. A copy of this encounter was shared with the provider.    Carley Pineda RN Diabetes Educator  8/22/2022 at 3:15 PM

## 2022-08-23 RX ORDER — EMPAGLIFLOZIN 25 MG/1
TABLET, FILM COATED ORAL
Qty: 30 TABLET | Refills: 4 | Status: SHIPPED | OUTPATIENT
Start: 2022-08-23 | End: 2023-02-01

## 2022-08-23 NOTE — ADDENDUM NOTE
Encounter addended by: Carley Pineda, RN on: 8/23/2022 12:01 PM   Actions taken: OP Care Plan problem added, OP Care Plan added, OP Care Plan goal added, OP Care Plan task added, OP Care Plan task completed, OP Care Plan goal modified

## 2022-08-25 NOTE — PROGRESS NOTES
Noted the following CGM information    Date: 8/9 to 8/22/22  Glucose management indicator: 9.7%    Average glucose: 268    Glucose range  Very low (<54): 0  low (<70): 0  In target range (): 19%  High (>180): 32%  Very high (>250): 49%    Please see Diabetic Education's note for additional information.     Courtney Chaudhary APRN FNP-BC  Diabetes and Wound Care

## 2022-08-25 NOTE — ADDENDUM NOTE
Encounter addended by: Courtney Chaudhary APRN CNP on: 8/25/2022 8:22 AM   Actions taken: Clinical Note Signed

## 2022-08-29 DIAGNOSIS — J43.9 PULMONARY EMPHYSEMA, UNSPECIFIED EMPHYSEMA TYPE (H): ICD-10-CM

## 2022-08-29 RX ORDER — IPRATROPIUM BROMIDE AND ALBUTEROL 20; 100 UG/1; UG/1
SPRAY, METERED RESPIRATORY (INHALATION)
Qty: 4 G | Refills: 0 | Status: SHIPPED | OUTPATIENT
Start: 2022-08-29 | End: 2022-10-26

## 2022-08-30 DIAGNOSIS — I10 ESSENTIAL HYPERTENSION: ICD-10-CM

## 2022-08-31 RX ORDER — ASPIRIN 81 MG/1
TABLET, CHEWABLE ORAL
Qty: 90 TABLET | Refills: 2 | Status: SHIPPED | OUTPATIENT
Start: 2022-08-31 | End: 2023-06-09

## 2022-09-06 ENCOUNTER — TELEPHONE (OUTPATIENT)
Dept: EDUCATION SERVICES | Facility: OTHER | Age: 57
End: 2022-09-06

## 2022-09-06 ENCOUNTER — PATIENT OUTREACH (OUTPATIENT)
Dept: EDUCATION SERVICES | Facility: HOSPITAL | Age: 57
End: 2022-09-06

## 2022-09-06 NOTE — TELEPHONE ENCOUNTER
Pt called and left a message yesterday when clinic was closed asking mayito should change her Bud sensor today or tomorrow. I called the pt back, the Bud reader did notify her when to change and she has changed her sensor and did not have any problems. She did state that she did have some readings that did not match with her BG meter. Sensor read at 300 and BG meter read 100. I advised her that there may have been a problem with her sensor. She has not had any problems with the new sensor. I advised her to call if she has this problem again. Pt has an appt with Courtney Chaudhary tomorrow and we will discuss at that time.

## 2022-09-06 NOTE — PROGRESS NOTES
Diabetes Self-Management Education & Support  CDE Visit Types:    Chart accessed to make follow up call as discussed at last encounter per patient request in afternoon hours to follow up on sensor placement.   Noted patient contacted clinic 9/5/Labor Day Holiday. LPN contacted patient this morning and patient had changed sensor without concern. Has follow up tomorrow 9/7 with Diabetes NP.    No additional call made.  Carley Pineda RN Diabetes Educator,  [phone]  9/6/2022 at 4:32 PM

## 2022-09-06 NOTE — TELEPHONE ENCOUNTER
Noted.   Will address it at her next appointment     Thank you    Courtney PINTO [facility]-BC  Diabetes and Wound Care

## 2022-09-12 ENCOUNTER — TELEPHONE (OUTPATIENT)
Dept: FAMILY MEDICINE | Facility: OTHER | Age: 57
End: 2022-09-12

## 2022-09-12 NOTE — TELEPHONE ENCOUNTER
Call from KHADIJAH Brunner with [facility] requesting Verbal Orders to Continue SNV's  1 x per week x  8 weeks.     Notified ok for verbal orders per Alessia Webb RN.

## 2022-09-27 ENCOUNTER — MEDICAL CORRESPONDENCE (OUTPATIENT)
Dept: HEALTH INFORMATION MANAGEMENT | Facility: HOSPITAL | Age: 57
End: 2022-09-27

## 2022-09-27 ENCOUNTER — TELEPHONE (OUTPATIENT)
Dept: FAMILY MEDICINE | Facility: OTHER | Age: 57
End: 2022-09-27

## 2022-09-27 DIAGNOSIS — Z53.9 PERSONS ENCOUNTERING HEALTH SERVICES FOR SPECIFIC PROCEDURES, NOT CARRIED OUT: Primary | ICD-10-CM

## 2022-09-27 NOTE — TELEPHONE ENCOUNTER
Myesha with  home care calling for continuation orders to continue weekly nurse visits.Gave VO for continuation.    Sruthi Casanova RN

## 2022-09-28 ENCOUNTER — ALLIED HEALTH/NURSE VISIT (OUTPATIENT)
Dept: EDUCATION SERVICES | Facility: OTHER | Age: 57
End: 2022-09-28
Attending: NURSE PRACTITIONER
Payer: COMMERCIAL

## 2022-09-28 DIAGNOSIS — F17.200 NICOTINE DEPENDENCE, UNCOMPLICATED, UNSPECIFIED NICOTINE PRODUCT TYPE: ICD-10-CM

## 2022-09-28 DIAGNOSIS — E11.65 TYPE 2 DIABETES MELLITUS WITH HYPERGLYCEMIA, WITHOUT LONG-TERM CURRENT USE OF INSULIN (H): Primary | ICD-10-CM

## 2022-09-28 PROCEDURE — 99213 OFFICE O/P EST LOW 20 MIN: CPT | Mod: 25 | Performed by: NURSE PRACTITIONER

## 2022-09-28 PROCEDURE — G0463 HOSPITAL OUTPT CLINIC VISIT: HCPCS

## 2022-09-28 PROCEDURE — 95251 CONT GLUC MNTR ANALYSIS I&R: CPT | Performed by: NURSE PRACTITIONER

## 2022-09-28 RX ORDER — INSULIN GLARGINE 300 U/ML
20 INJECTION, SOLUTION SUBCUTANEOUS AT BEDTIME
Qty: 9 ML | Refills: 1 | Status: SHIPPED | OUTPATIENT
Start: 2022-09-28

## 2022-09-28 ASSESSMENT — ANXIETY QUESTIONNAIRES
2. NOT BEING ABLE TO STOP OR CONTROL WORRYING: NOT AT ALL
3. WORRYING TOO MUCH ABOUT DIFFERENT THINGS: SEVERAL DAYS
1. FEELING NERVOUS, ANXIOUS, OR ON EDGE: NOT AT ALL
IF YOU CHECKED OFF ANY PROBLEMS ON THIS QUESTIONNAIRE, HOW DIFFICULT HAVE THESE PROBLEMS MADE IT FOR YOU TO DO YOUR WORK, TAKE CARE OF THINGS AT HOME, OR GET ALONG WITH OTHER PEOPLE: NOT DIFFICULT AT ALL
6. BECOMING EASILY ANNOYED OR IRRITABLE: NOT AT ALL
GAD7 TOTAL SCORE: 2
7. FEELING AFRAID AS IF SOMETHING AWFUL MIGHT HAPPEN: NOT AT ALL
GAD7 TOTAL SCORE: 2
4. TROUBLE RELAXING: NOT AT ALL
5. BEING SO RESTLESS THAT IT IS HARD TO SIT STILL: SEVERAL DAYS

## 2022-09-28 ASSESSMENT — PAIN SCALES - GENERAL: PAINLEVEL: NO PAIN (0)

## 2022-09-28 ASSESSMENT — PATIENT HEALTH QUESTIONNAIRE - PHQ9: SUM OF ALL RESPONSES TO PHQ QUESTIONS 1-9: 3

## 2022-09-28 NOTE — PROGRESS NOTES
SUBJECTIVE:  Marly Cannon, 57 year old, female presents with the following Chief Complaint(s) with HPI to follow:  Chief Complaint   Patient presents with     Diabetes        Diabetes Follow-up    Patient is checking blood sugars: >4x/day using her personal CGM (Six3yle Bud).  Results:    Date: 9/15 to 9/28/22  Glucose management indicator: n/a    Average glucose: 283    Glucose range  Very low (<54): 0  low (<70): 0  In target range (): 8%  High (>180): 36%  Very high (>250): 56%        Symptoms of hypoglycemia (low blood sugar): no    Paresthesias (numbness or burning in feet) or sores: no, no sores    Diabetic eye exam within the last year: UTD    Breakfast eaten regularly: most of the time    Patient counting carbs: Yes    Exercising: depends on the weather          HPI:  Marly's here today for the follow up regarding her Diabetes mellitus, Type 2.    A1c  Lab Results   Component Value Date    A1C 10.4 07/15/2022    A1C 10.8 04/15/2022    A1C 8.6 01/14/2022    A1C 9.8 08/12/2021    A1C >14.0 05/10/2021    A1C 10.7 02/10/2021    A1C 7.6 11/09/2020    A1C 7.9 08/07/2020    A1C 8.1 05/04/2020     Current Diabetes medication:   1. Omnipod dash, using Novolog--stopped this summer   2. Jardiance 25 mg daily  Statin use: yes, simvastatin 20 mg daily  ASA: yes, 81 mg daily    Marly's here a download of her CGM and insulin pump with possible diabetic medication adjustments.   Reports the following:  As stated above, she stopped her insulin pump (Omnipod) this summer  Only taking her Jardiance.     Denies any new health concerns.     Weight trend   Wt Readings from Last 4 Encounters:   09/28/22 89.4 kg (197 lb)   08/22/22 87.6 kg (193 lb 3.2 oz)   08/08/22 88.3 kg (194 lb 11.2 oz)   07/27/22 86.5 kg (190 lb 12.8 oz)         Patient Active Problem List   Diagnosis     Type 2 diabetes, HbA1c goal < 7% (H)     ACP (advance care planning)     Stage 3 chronic kidney disease (H)     Pulmonary emphysema (H)      Hyperparathyroidism due to renal insufficiency (H)     Vitamin D deficiency     Intellectual disability     Breast fibrocystic disorder     Tobacco abuse     Microalbuminuria due to type 2 diabetes mellitus (H)     H/O colonoscopy     [place] cardiac risk <10% in next 10 years     Tubular adenoma of colon     Hyperlipidemia, unspecified hyperlipidemia type     Essential hypertension     Callus of foot     Memory disturbance     ADEEL (obstructive sleep apnea)     Tremor       No past medical history on file.    Past Surgical History:   Procedure Laterality Date     BIOPSY BREAST Left 02/14/2008    patient states no bx, Clip in left breast/ stereo bxc 2-- no path in [facility] that far back     COLONOSCOPY N/A 08/20/2015    Procedure: COLONOSCOPY;  Surgeon: Gentry Porter MD;  Location: [facility] OR     COLONOSCOPY N/A 09/21/2018    Procedure: COLONOSCOPY;  COLONOSCOPY WITH POLYPECTOMY;  Surgeon: Gentry Porter MD;  Location: [facility] OR       Family History   Problem Relation Age of Onset     Diabetes Mother      Diabetes Father      Hypertension Brother      Diabetes Sister        Social History     Tobacco Use     Smoking status: Current Every Day Smoker     Packs/day: 1.00     Years: 34.00     Pack years: 34.00     Types: Cigarettes     Start date: 1/1/1979     Smokeless tobacco: Never Used     Tobacco comment: Declined QP 05/13/2020   Substance Use Topics     Alcohol use: No     Alcohol/week: 0.0 standard drinks       Current Outpatient Medications   Medication Sig Dispense Refill     Acetaminophen (TYLENOL PO) Take 325 mg by mouth every 6 hours as needed        amLODIPine (NORVASC) 5 MG tablet TAKE 1 TABLET BY MOUTH DAILY 90 tablet 1     ammonium lactate (AMLACTIN) 12 % external cream Apply topically 2 times daily       aspirin (ASPIRIN LOW DOSE) 81 MG chewable tablet CHEW 1 TABLET BY MOUTH DAILY. DO NOT SPLIT OR CRUSH 90 tablet 2     atorvastatin (LIPITOR) 40 MG tablet TAKE 1 TABLET BY MOUTH AT BEDTIME 90  tablet 1     budesonide-formoterol (SYMBICORT) 160-4.5 MCG/ACT Inhaler Inhale 2 puffs into the lungs 2 times daily 1 Inhaler 3     Cholecalciferol (VITAMIN D3) 50 MCG (2000 UT) CAPS TAKE 1 CAPSULE BY MOUTH DAILY 90 capsule 3     COMBIVENT RESPIMAT  MCG/ACT inhaler INHALE 1 PUFF INTO THE LUNGS 4 TIMES DAILY 4 g 0     Continuous Blood Gluc Sensor (FREESTYLE HANK 2 SENSOR) MISC 1 each every 14 days Use 1 sensor every 14 days. Use to read blood sugars per 's instructions. 2 each 5     docusate sodium (COLACE) 100 MG capsule TAKE 1 CAPSULE BY MOUTH DAILY 90 capsule 1     hydrocortisone (CORTAID) 1 % external cream Apply topically 2 times daily 453.6 g 0     hydrocortisone 2.5 % cream Apply topically 2 times daily       insulin glargine U-300 (TOUJEO SOLOSTAR) 300 UNIT/ML (1 units dial) pen Inject 20 Units Subcutaneous At Bedtime 9 mL 1     insulin pen needle (32G X 4 MM) 32G X 4 MM miscellaneous Use 1 pen needles daily 100 each 3     JARDIANCE 25 MG TABS tablet TAKE 1 TABLET BY MOUTH DAILY 30 tablet 4     lisinopril (ZESTRIL) 40 MG tablet TAKE 1 TABLET BY MOUTH DAILY 90 tablet 1     nystatin (MYCOSTATIN) 673366 UNIT/GM external powder Apply topically 3 times daily 60 g 3     OneTouch Delica Lancets 33G MISC Inject 1 each Subcutaneous 3 times daily (before meals) 100 each 11     ONETOUCH ULTRA test strip USE TO TEST BLOOD SUGAR 4 TIMES DAILY 100 strip 1     order for DME Women briefs 50 each 1     polyethylene glycol (MIRALAX) 17 g packet Take 17 g by mouth 3 times daily       primidone (MYSOLINE) 50 MG tablet TAKE 1 TABLET BY MOUTH AT BEDTIME 30 tablet 11     tiZANidine (ZANAFLEX) 4 MG tablet Take 1 tablet (4 mg) by mouth 3 times daily as needed for muscle spasms 30 tablet 0     triamcinolone (KENALOG) 0.1 % external cream USE AS DIRECTED 80 g 0     urea (GORDONS UREA) 40 % external ointment Apply topically 2 times daily 30 g 0       No Known Allergies    REVIEW OF SYSTEMS  Skin: negative  Eyes:  "negative, wears glasses    Ears/Nose/Throat: negative  Respiratory: No shortness of breath or hemoptysis; smoker's cough; hx of sleep apnea  Cardiovascular: negative  Gastrointestinal: negative  Genitourinary: negative  Musculoskeletal: negative; hx of left shoulder pain and left knee--depends on the weather   Neurologic: negative  Psychiatric: negative  Hematologic/Lymphatic/Immunologic: negative  Endocrine: positive for diabetes     OBJECTIVE:  /81   Pulse 93   Resp 16   Ht 1.626 m (5' 4\")   Wt 89.4 kg (197 lb)   SpO2 90%   BMI 33.81 kg/m    Constitutional: alert and no distress  Respiratory:  Good diaphragmatic excursion.   Musculoskeletal: extremities normal- no gross deformities noted and gait normal  Skin: no suspicious lesions or rashes to visible skin  Psychiatric: mentation appears normal and affect normal/bright      LABS  Results for orders placed or performed in visit on 09/28/22   GLUCOSE MONITOR, 72 HOUR, PHYS INTERP     Status: None    Narrative    Date: 9/15 to 9/28/22  Glucose management indicator: n/a    Average glucose: 283    Glucose range  Very low (<54): 0  low (<70): 0  In target range (): 8%  High (>180): 36%  Very high (>250): 56%       ASSESSMENT / PLAN:  (E11.65) Type 2 diabetes mellitus with hyperglycemia, without long-term current use of insulin (H)  (primary encounter diagnosis)  Comment: noted CGM   Plan: insulin pen needle (32G X 4 MM) 32G X 4 MM         miscellaneous, GLUCOSE MONITOR, 72 HOUR, PHYS         INTERP          Asked again is she's wearing anything on her body besides the CGM--she states no    Decision to start basal insulin  Education focus on this  No questions    (F17.200) Nicotine dependence, uncomplicated, unspecified nicotine product type  Comment: note and refused  Plan:   Spoke with patient regarding options for smoking cessation.  Various pharmacologic options and behavioral techniques were reviewed.  The relative effectiveness as well as risks " and benefits were reviewed with patient.  Patient is interested in: No interventions    Smoking Cessation    Marly's aware to let us know when she's ready to quit smoking.  Discussed the dangers of smoking with diabetes    Follow up  Nurse only download when you see Amberly in October November    Call sooner if any issues    Patient Instructions   Continue working on healthy eating and moving (start low and slow, work up to 30 min, 5x/week)    BG goals:  Fasting and before meals <130, >70  2 hour after eating <180    We only need 1/2 of these numbers to be within target then your A1c will be within target    Medication changes   1.  Toujeo  15 units daily for one week, then increase to 20 units daily      Follow up   Nurse only download when you see Amberly in October November    Call me sooner if any problems/concerns and/or questions develop including consistent low BGs <70 or consistent high BGs >200  [phone] (Unit Coordinator)    [phone] (Anna, Nurse)    Smoking:   Try keep working on reducing the amount of cigarettes                 Time: 25 minutes  Barrier: see [facility]  Willingness to learn: accepting    Courtney PINTO [facility]  Diabetes and Wound Care    Cc: Amberly Whyte NP      With the electronic record, we can now more quickly and easily track our patient diabetic goals. Our diabetes clinical review is in progress and these are the indicators we are monitoring for good diabetes health.     1.) HbA1C less than 7 (measurement of your average blood sugars)  2.) Blood Pressure less than 140/80  3.) LDL less than 100 (bad cholesterol)  4.) HbA1C is checked in the last 6 months and below 7% (more frequently if not at goal or adjusting medications)  5.) LDL is checked in the last 12 months (more frequently if not at goal or adjusting medications)  6.) Taking one baby aspirin daily (unless otherwise instructed)  7.) No tobacco use  8) Statin use     You have achieved 6 out of 8 of these and I am  encouraging you to come in and get tuned up to achieve 8 out of 8!  Here is what you have achieved so far in my goals for you:  1.) HbA1C  less than 7:                              NO  Your last  HbA1C :  Lab Results   Component Value Date    A1C 10.4 07/15/2022    A1C 10.8 04/15/2022    A1C 8.6 01/14/2022    A1C 9.8 08/12/2021    A1C >14.0 05/10/2021    A1C 10.7 02/10/2021    A1C 7.6 11/09/2020    A1C 7.9 08/07/2020    A1C 8.1 05/04/2020     2.) Blood Pressure less than 140/80:       YES      Your last    BP Readings from Last 1 Encounters:   09/28/22 124/81     3.) LDL less than 100:                              YES      Your last     LDL Cholesterol Calculated   Date Value Ref Range Status   07/15/2022 66 <=100 mg/dL Final   11/09/2020 74 <100 mg/dL Final     Comment:     Desirable:       <100 mg/dl       4.) Checked HbA1C in the past 6 months: YES      5.) Checked LDL in the past 12 months:    YES      6.) Taking one aspirin daily:                       YES     7.) No tobacco use:                                        NO   8.) Statin use      YES         Addendum:  Marly called and now states she's on the Omnipod  Told her to stop the insulin  Stop by for a download of her Omnipod

## 2022-09-28 NOTE — PROGRESS NOTES
"Chief Complaint   Patient presents with     Diabetes       Initial BP (!) 157/90   Pulse 93   Resp 16   Ht 1.626 m (5' 4\")   Wt 89.4 kg (197 lb)   SpO2 90%   BMI 33.81 kg/m   Estimated body mass index is 33.81 kg/m  as calculated from the following:    Height as of this encounter: 1.626 m (5' 4\").    Weight as of this encounter: 89.4 kg (197 lb).  Medication Reconciliation: complete  Gege Walter LPN    "

## 2022-09-28 NOTE — PATIENT INSTRUCTIONS
Continue working on healthy eating and moving (start low and slow, work up to 30 min, 5x/week)    BG goals:  Fasting and before meals <130, >70  2 hour after eating <180    We only need 1/2 of these numbers to be within target then your A1c will be within target    Medication changes    Toujeo  15 units daily for one week, then increase to 20 units daily      Follow up   Nurse only download when you see Amberly in October November    Call me sooner if any problems/concerns and/or questions develop including consistent low BGs <70 or consistent high BGs >200  [phone] (Unit Coordinator)    [phone] (Anna, Nurse)    Smoking:   Try keep working on reducing the amount of cigarettes

## 2022-09-29 ENCOUNTER — TELEPHONE (OUTPATIENT)
Dept: EDUCATION SERVICES | Facility: OTHER | Age: 57
End: 2022-09-29

## 2022-09-30 VITALS
DIASTOLIC BLOOD PRESSURE: 81 MMHG | BODY MASS INDEX: 33.63 KG/M2 | OXYGEN SATURATION: 90 % | WEIGHT: 197 LBS | HEIGHT: 64 IN | HEART RATE: 93 BPM | RESPIRATION RATE: 16 BRPM | SYSTOLIC BLOOD PRESSURE: 124 MMHG

## 2022-10-05 ENCOUNTER — ALLIED HEALTH/NURSE VISIT (OUTPATIENT)
Dept: EDUCATION SERVICES | Facility: OTHER | Age: 57
End: 2022-10-05
Attending: NURSE PRACTITIONER
Payer: COMMERCIAL

## 2022-10-05 DIAGNOSIS — E11.65 TYPE 2 DIABETES MELLITUS WITH HYPERGLYCEMIA, WITHOUT LONG-TERM CURRENT USE OF INSULIN (H): Primary | ICD-10-CM

## 2022-10-05 NOTE — PROGRESS NOTES
Pt came in for an Omnipod download today. This was completed and given to Courtney palma.    Gege Walter

## 2022-10-06 ENCOUNTER — TELEPHONE (OUTPATIENT)
Dept: EDUCATION SERVICES | Facility: OTHER | Age: 57
End: 2022-10-06

## 2022-10-06 NOTE — TELEPHONE ENCOUNTER
Courtney Chaudhary APRN CNP  You 2 hours ago (11:42 AM)     DS    She probably needs it set up for her omnipod to work.       Can you ask her to call us when she gets it.       Thanks   Courtney    Message text

## 2022-10-06 NOTE — TELEPHONE ENCOUNTER
Pt called she had dropped her Omnipod in a toilet yesterday and it did stop working. She called Omnipod and they will be shipping her a new pdm. She will receive this on Friday.

## 2022-10-06 NOTE — PROGRESS NOTES
Marly stopped by for a download of her Omnipod Dash on 10/5/22, which is as followed:      BG ave: 183  Highest: 371  Lowest: 129    Glucose range  In target range (): 87%  Very high (>250): 13%    Checking BGs, 0-3x/day    While here, she accidentally dropped her PDM in the toilet and it was found by another person  Please see other note.       Called Marly.   She's to take Tresiba 15 units today (10/6/22)    Courtney Chaudhary APRN FNP-BC  Diabetes and Wound Care

## 2022-10-07 NOTE — PROGRESS NOTES
As patient hasn't received her Omnipod Dash PDM, I would encourage her the followin. Tresiba   20 units daily    Will have her come on Monday to get the PDM set up.   I will have Anna MUNOZ LPN (diabetic nurse) to call patient--do not take Tresiba on Monday.       Courtney PINTO [facility]-BC  Diabetes and Wound Care

## 2022-10-10 ENCOUNTER — ALLIED HEALTH/NURSE VISIT (OUTPATIENT)
Dept: EDUCATION SERVICES | Facility: OTHER | Age: 57
End: 2022-10-10
Attending: NURSE PRACTITIONER
Payer: COMMERCIAL

## 2022-10-10 DIAGNOSIS — E11.65 TYPE 2 DIABETES MELLITUS WITH HYPERGLYCEMIA, WITHOUT LONG-TERM CURRENT USE OF INSULIN (H): Primary | ICD-10-CM

## 2022-10-10 NOTE — PROGRESS NOTES
New Omnipod Dash PDM won't turn on.     Charged it and it still wouldn't turn on     Plan:  Marly to call Omnipod again  Keep taking Tresiba 20 units daily  Marly to call when she gets her new Omnipod Dash PDM    Courtney Chaudhary APRN FNP-BC  Diabetes and Wound Care

## 2022-10-12 NOTE — PROGRESS NOTES
"  Assessment & Plan     Essential hypertension  Well controlled. Continue current medications. Encouraged daily exercise and a low sodium diet. Recommended checking BP's 2x/wk, call the clinic if consistantly s>140 or d>90. Follow up in 3 months.     Hyperlipidemia, unspecified hyperlipidemia type  Tolerating statin. Lipids well controlled in 7/2022. AST and ALT were normal at the time.     Stage 3 chronic kidney disease, unspecified whether stage 3a or 3b CKD (H)  Stable. Creatinine runs between 1.1 to 1.2. It is 1.28 today. Stressed the importance of getting better control of her diabetes as uncontrolled diabetes if very hard on the kidneys.    Microalbuminuria due to type 2 diabetes mellitus (H)  On ACE. Will continue.     Type 2 diabetes, HbA1c goal < 7% (H)  A1C poorly controlled at 11.4 today. Difficult to manage as she is non-complaint with her medications. Will defer further management back to the diabetic center. On statin. BP at goal. Eye exam UTD. See me back 3 months.     Tobacco abuse  Cessation encouraged.     Pulmonary emphysema, unspecified emphysema type (H)  Stable. Continue current inhalers.     Hyponatremia  Sodium slightly low today at 130. When questioned further, she admits to drinking more. Most likely as her diabetes is poorly controlled. She was encouraged to limit her fluids. Will recheck in one week. Will also get her back in the diabetic center to try and obtain better control of her diabetes.     Elevated Hemoglobin  Hgb 17.3 today. Declines to see hematology. Aware of the risks. Most likely from her COPD. I also suspect she has sleep apnea, but refuses to have a sleep study. She is on asa. Will recheck 3 months.     BMI:   Estimated body mass index is 33.3 kg/m  as calculated from the following:    Height as of this encounter: 1.626 m (5' 4\").    Weight as of this encounter: 88 kg (194 lb).   Weight management plan: Discussed healthy diet and exercise guidelines      Amberly Whyte, " NP  [facility] [place]    Mike Luke is a 57 year old, presenting for the following health issues:  Lipids, Diabetes, and Hypertension      HPI     Diabetes Follow-up    How often are you checking your blood sugar? One time daily  What time of day are you checking your blood sugars (select all that apply)?  In the morning. Running around 170  Have you had any blood sugars above 200?  No  Have you had any blood sugars below 70?  No    What symptoms do you notice when your blood sugar is low?  None    What concerns do you have today about your diabetes? None     Do you have any of these symptoms? (Select all that apply)  No numbness or tingling in feet.  No redness, sores or blisters on feet.  No complaints of excessive thirst.  No reports of blurry vision.  No significant changes to weight.     Follows with the diabetic center. A1C was 10.4 on 7/15/22. Non complaint with medications.     Taking Toujeo and Jardiance without side effects. Metformin caused an upset stomach.     On asa.     She denies chest pain, dizziness, syncope, or palpitations. Some chronic shortness of breath related to her COPD. Feels this has improved. Denies any recent shortness of breath.     Working on weight loss. Limits her carbs. Walking outside more.     Microalbuminuria present. On ACE.     She does have chronic kidney disease. Typically avoids NSAIDs.     No abdominal pain. No nausea or vomiting.       Hyperlipidemia Follow-Up      Are you regularly taking any medication or supplement to lower your cholesterol?   Yes- Lipitor 40 mg    Are you having muscle aches or other side effects that you think could be caused by your cholesterol lowering medication?  No     She denies chest pain, dizziness, syncope, or palpitations. Some chronic shortness of breath related to her COPD. Feels this has improved. Denies any recent shortness of breath.     Hypertension Follow-up      Do you check your blood pressure regularly  outside of the clinic? No     Are you following a low salt diet? No    Are your blood pressures ever more than 140 on the top number (systolic) OR more   than 90 on the bottom number (diastolic), for example 140/90? N/A   Taking lisinopril 40 mg with amlodipine 5 mg. No side effects.     BP Readings from Last 2 Encounters:   10/17/22 128/74   09/28/22 124/81     Hemoglobin A1C (%)   Date Value   10/17/2022 11.4 (H)   07/15/2022 10.4 (H)   05/10/2021 >14.0 (H)   02/10/2021 10.7 (H)     LDL Cholesterol Calculated (mg/dL)   Date Value   07/15/2022 66   04/15/2022 83   11/09/2020 74   02/04/2020 49       Chronic Kidney Disease Follow-up    Do you take any over the counter pain medicine?: No    COPD Follow-Up    Overall, how are your COPD symptoms since your last clinic visit?  Better    How much fatigue or shortness of breath do you have when you are walking?  Same as usual    How much shortness of breath do you have when you are resting?  None    How often do you cough? Rarely    Have you noticed any change in your sputum/phlegm?  No    Have you experienced a recent fever? No    Please describe how far you can walk without stopping to rest:  1-2 miles    How many flights of stairs are you able to walk up without stopping?  None    Have you had any Emergency Room Visits, Urgent Care Visits, or Hospital Admissions because of your COPD since your last office visit?  No     She continues to smoke. Currently smoking 1 ppd. No interest in quitting.     She is due for several vaccines. Declines them all.       History   Smoking Status     Every Day     Packs/day: 1.00     Years: 34.00     Types: Cigarettes     Start date: 1/1/1979   Smokeless Tobacco     Never     Comment: Declined QP 05/13/2020     No results found for: FEV1, VZZ2EOR        Review of Systems   Constitutional, HEENT, cardiovascular, pulmonary, gi and gu systems are negative, except as otherwise noted.      Objective    /74 (BP Location: Left arm,  "Patient Position: Sitting, Cuff Size: Adult Large)   Pulse 92   Temp 97.8  F (36.6  C) (Tympanic)   Ht 1.626 m (5' 4\")   Wt 88 kg (194 lb)   SpO2 90%   BMI 33.30 kg/m    Body mass index is 33.3 kg/m .  Physical Exam   GENERAL: overweight, alert and no distress  EYES: Eyes grossly normal to inspection, PERRL and conjunctivae and sclerae normal  HENT: ear canals and TM's normal, nose and mouth without ulcers or lesions  NECK: no adenopathy, no asymmetry, masses, or scars and thyroid normal to palpation  RESP: lungs clear to auscultation - no rales, rhonchi or wheezes  CV: regular rate and rhythm, no murmur, click or rub, no peripheral edema and peripheral pulses strong  NEURO: Normal strength and tone, mentation intact and speech normal  PSYCH: mentation appears normal, affect normal/bright  Diabetic foot exam: normal DP and PT pulses, no trophic changes or ulcerative lesions, normal sensory exam and second and third toe are webbed on both feet    Results for orders placed or performed in visit on 10/17/22 (from the past 24 hour(s))   Basic metabolic panel   Result Value Ref Range    Sodium 130 (L) 136 - 145 mmol/L    Potassium 3.8 3.4 - 5.3 mmol/L    Chloride 93 (L) 98 - 107 mmol/L    Carbon Dioxide (CO2) 28 22 - 29 mmol/L    Anion Gap 9 7 - 15 mmol/L    Urea Nitrogen 27.6 (H) 8.0 - 23.0 mg/dL    Creatinine 1.28 (H) 0.51 - 0.95 mg/dL    Calcium 9.9 8.6 - 10.0 mg/dL    Glucose 376 (H) 70 - 99 mg/dL    GFR Estimate 48 (L) >60 mL/min/1.73m2   Hemoglobin A1c   Result Value Ref Range    Estimated Average Glucose 280 mg/dL    Hemoglobin A1C 11.4 (H) <5.7 %   CBC with platelets and differential    Narrative    The following orders were created for panel order CBC with platelets and differential.  Procedure                               Abnormality         Status                     ---------                               -----------         ------                     CBC with platelets and d...[[id number]]  Abnormal    "         Final result                 Please view results for these tests on the individual orders.   Magnesium   Result Value Ref Range    Magnesium 1.8 1.7 - 2.3 mg/dL   CBC with platelets and differential   Result Value Ref Range    WBC Count 9.1 4.0 - 11.0 10e3/uL    RBC Count 5.84 (H) 3.80 - 5.20 10e6/uL    Hemoglobin 17.3 (H) 11.7 - 15.7 g/dL    Hematocrit 52.2 (H) 35.0 - 47.0 %    MCV 89 78 - 100 fL    MCH 29.6 26.5 - 33.0 pg    MCHC 33.1 31.5 - 36.5 g/dL    RDW 12.8 10.0 - 15.0 %    Platelet Count 275 150 - 450 10e3/uL    % Neutrophils 52 %    % Lymphocytes 39 %    % Monocytes 6 %    % Eosinophils 2 %    % Basophils 1 %    % Immature Granulocytes 0 %    NRBCs per 100 WBC 0 <1 /100    Absolute Neutrophils 4.7 1.6 - 8.3 10e3/uL    Absolute Lymphocytes 3.6 0.8 - 5.3 10e3/uL    Absolute Monocytes 0.5 0.0 - 1.3 10e3/uL    Absolute Eosinophils 0.2 0.0 - 0.7 10e3/uL    Absolute Basophils 0.1 0.0 - 0.2 10e3/uL    Absolute Immature Granulocytes 0.0 <=0.4 10e3/uL    Absolute NRBCs 0.0 10e3/uL

## 2022-10-17 ENCOUNTER — ALLIED HEALTH/NURSE VISIT (OUTPATIENT)
Dept: EDUCATION SERVICES | Facility: OTHER | Age: 57
End: 2022-10-17
Attending: NURSE PRACTITIONER
Payer: COMMERCIAL

## 2022-10-17 ENCOUNTER — LAB (OUTPATIENT)
Dept: LAB | Facility: OTHER | Age: 57
End: 2022-10-17
Attending: NURSE PRACTITIONER
Payer: COMMERCIAL

## 2022-10-17 ENCOUNTER — OFFICE VISIT (OUTPATIENT)
Dept: FAMILY MEDICINE | Facility: OTHER | Age: 57
End: 2022-10-17
Attending: NURSE PRACTITIONER
Payer: COMMERCIAL

## 2022-10-17 VITALS
HEART RATE: 92 BPM | HEIGHT: 64 IN | OXYGEN SATURATION: 90 % | WEIGHT: 194 LBS | DIASTOLIC BLOOD PRESSURE: 74 MMHG | SYSTOLIC BLOOD PRESSURE: 128 MMHG | TEMPERATURE: 97.8 F | BODY MASS INDEX: 33.12 KG/M2

## 2022-10-17 DIAGNOSIS — Z79.4 TYPE 2 DIABETES MELLITUS WITH HYPERGLYCEMIA, WITH LONG-TERM CURRENT USE OF INSULIN (H): ICD-10-CM

## 2022-10-17 DIAGNOSIS — E11.65 TYPE 2 DIABETES MELLITUS WITH HYPERGLYCEMIA, WITHOUT LONG-TERM CURRENT USE OF INSULIN (H): Primary | ICD-10-CM

## 2022-10-17 DIAGNOSIS — D58.2 ELEVATED HEMOGLOBIN (H): ICD-10-CM

## 2022-10-17 DIAGNOSIS — N18.30 STAGE 3 CHRONIC KIDNEY DISEASE, UNSPECIFIED WHETHER STAGE 3A OR 3B CKD (H): ICD-10-CM

## 2022-10-17 DIAGNOSIS — E11.65 TYPE 2 DIABETES MELLITUS WITH HYPERGLYCEMIA, WITH LONG-TERM CURRENT USE OF INSULIN (H): ICD-10-CM

## 2022-10-17 DIAGNOSIS — Z72.0 TOBACCO ABUSE: ICD-10-CM

## 2022-10-17 DIAGNOSIS — E11.9 TYPE 2 DIABETES, HBA1C GOAL < 7% (H): ICD-10-CM

## 2022-10-17 DIAGNOSIS — E78.5 HYPERLIPIDEMIA, UNSPECIFIED HYPERLIPIDEMIA TYPE: ICD-10-CM

## 2022-10-17 DIAGNOSIS — E87.1 HYPONATREMIA: ICD-10-CM

## 2022-10-17 DIAGNOSIS — R80.9 MICROALBUMINURIA DUE TO TYPE 2 DIABETES MELLITUS (H): ICD-10-CM

## 2022-10-17 DIAGNOSIS — I10 ESSENTIAL HYPERTENSION: Primary | ICD-10-CM

## 2022-10-17 DIAGNOSIS — E11.29 MICROALBUMINURIA DUE TO TYPE 2 DIABETES MELLITUS (H): ICD-10-CM

## 2022-10-17 DIAGNOSIS — J43.9 PULMONARY EMPHYSEMA, UNSPECIFIED EMPHYSEMA TYPE (H): ICD-10-CM

## 2022-10-17 LAB
ANION GAP SERPL CALCULATED.3IONS-SCNC: 9 MMOL/L (ref 7–15)
BASOPHILS # BLD AUTO: 0.1 10E3/UL (ref 0–0.2)
BASOPHILS NFR BLD AUTO: 1 %
BUN SERPL-MCNC: 27.6 MG/DL (ref 8–23)
CALCIUM SERPL-MCNC: 9.9 MG/DL (ref 8.6–10)
CHLORIDE SERPL-SCNC: 93 MMOL/L (ref 98–107)
CREAT SERPL-MCNC: 1.28 MG/DL (ref 0.51–0.95)
DEPRECATED HCO3 PLAS-SCNC: 28 MMOL/L (ref 22–29)
EOSINOPHIL # BLD AUTO: 0.2 10E3/UL (ref 0–0.7)
EOSINOPHIL NFR BLD AUTO: 2 %
ERYTHROCYTE [DISTWIDTH] IN BLOOD BY AUTOMATED COUNT: 12.8 % (ref 10–15)
EST. AVERAGE GLUCOSE BLD GHB EST-MCNC: 280 MG/DL
GFR SERPL CREATININE-BSD FRML MDRD: 48 ML/MIN/1.73M2
GLUCOSE SERPL-MCNC: 376 MG/DL (ref 70–99)
HBA1C MFR BLD: 11.4 %
HCT VFR BLD AUTO: 52.2 % (ref 35–47)
HGB BLD-MCNC: 17.3 G/DL (ref 11.7–15.7)
IMM GRANULOCYTES # BLD: 0 10E3/UL
IMM GRANULOCYTES NFR BLD: 0 %
LYMPHOCYTES # BLD AUTO: 3.6 10E3/UL (ref 0.8–5.3)
LYMPHOCYTES NFR BLD AUTO: 39 %
MAGNESIUM SERPL-MCNC: 1.8 MG/DL (ref 1.7–2.3)
MCH RBC QN AUTO: 29.6 PG (ref 26.5–33)
MCHC RBC AUTO-ENTMCNC: 33.1 G/DL (ref 31.5–36.5)
MCV RBC AUTO: 89 FL (ref 78–100)
MONOCYTES # BLD AUTO: 0.5 10E3/UL (ref 0–1.3)
MONOCYTES NFR BLD AUTO: 6 %
NEUTROPHILS # BLD AUTO: 4.7 10E3/UL (ref 1.6–8.3)
NEUTROPHILS NFR BLD AUTO: 52 %
NRBC # BLD AUTO: 0 10E3/UL
NRBC BLD AUTO-RTO: 0 /100
PLATELET # BLD AUTO: 275 10E3/UL (ref 150–450)
POTASSIUM SERPL-SCNC: 3.8 MMOL/L (ref 3.4–5.3)
RBC # BLD AUTO: 5.84 10E6/UL (ref 3.8–5.2)
SODIUM SERPL-SCNC: 130 MMOL/L (ref 136–145)
WBC # BLD AUTO: 9.1 10E3/UL (ref 4–11)

## 2022-10-17 PROCEDURE — 80048 BASIC METABOLIC PNL TOTAL CA: CPT | Mod: ZL | Performed by: NURSE PRACTITIONER

## 2022-10-17 PROCEDURE — 85004 AUTOMATED DIFF WBC COUNT: CPT | Mod: ZL | Performed by: NURSE PRACTITIONER

## 2022-10-17 PROCEDURE — G0463 HOSPITAL OUTPT CLINIC VISIT: HCPCS

## 2022-10-17 PROCEDURE — 99214 OFFICE O/P EST MOD 30 MIN: CPT | Performed by: NURSE PRACTITIONER

## 2022-10-17 PROCEDURE — 83036 HEMOGLOBIN GLYCOSYLATED A1C: CPT | Mod: ZL | Performed by: NURSE PRACTITIONER

## 2022-10-17 PROCEDURE — 83735 ASSAY OF MAGNESIUM: CPT | Mod: ZL | Performed by: NURSE PRACTITIONER

## 2022-10-17 PROCEDURE — 36415 COLL VENOUS BLD VENIPUNCTURE: CPT | Mod: ZL | Performed by: NURSE PRACTITIONER

## 2022-10-17 ASSESSMENT — ASTHMA QUESTIONNAIRES
QUESTION_2 LAST FOUR WEEKS HOW OFTEN HAVE YOU HAD SHORTNESS OF BREATH: NOT AT ALL
ACT_TOTALSCORE: 24
ACT_TOTALSCORE: 24
QUESTION_5 LAST FOUR WEEKS HOW WOULD YOU RATE YOUR ASTHMA CONTROL: COMPLETELY CONTROLLED
QUESTION_4 LAST FOUR WEEKS HOW OFTEN HAVE YOU USED YOUR RESCUE INHALER OR NEBULIZER MEDICATION (SUCH AS ALBUTEROL): ONCE A WEEK OR LESS
QUESTION_3 LAST FOUR WEEKS HOW OFTEN DID YOUR ASTHMA SYMPTOMS (WHEEZING, COUGHING, SHORTNESS OF BREATH, CHEST TIGHTNESS OR PAIN) WAKE YOU UP AT NIGHT OR EARLIER THAN USUAL IN THE MORNING: NOT AT ALL
QUESTION_1 LAST FOUR WEEKS HOW MUCH OF THE TIME DID YOUR ASTHMA KEEP YOU FROM GETTING AS MUCH DONE AT WORK, SCHOOL OR AT HOME: NONE OF THE TIME

## 2022-10-17 NOTE — PROGRESS NOTES
Omnipod dash PDM set up today.     Will have Marly start her Omnipod tomorrow (and stop Tresiba today) as she's not sure when she took her Tresiba yesterday      Education on the food diary feature  Added some foods she reports eating.      Follow up   October 24th for a download      Courtney PINTO [facility]-BC  Diabetes and Wound Care

## 2022-10-17 NOTE — NURSING NOTE
"Chief Complaint   Patient presents with     Lipids     Diabetes     Hypertension       Initial /74 (BP Location: Left arm, Patient Position: Sitting, Cuff Size: Adult Large)   Pulse 92   Temp 97.8  F (36.6  C) (Tympanic)   Ht 1.626 m (5' 4\")   Wt 88 kg (194 lb)   SpO2 90%   BMI 33.30 kg/m   Estimated body mass index is 33.3 kg/m  as calculated from the following:    Height as of this encounter: 1.626 m (5' 4\").    Weight as of this encounter: 88 kg (194 lb).  Medication Reconciliation: complete  Юлия Rowland LPN  "

## 2022-10-31 NOTE — PROGRESS NOTES
Medication Therapy Management (MTM) Encounter    ASSESSMENT:                            Medication Adherence/Access: No issues identified    Type 2 Diabetes: Patient is not meeting A1c goal of < 7%. Recently started on Omnipod Dash system through diabetic education. Patient would benefit from continued follow-up with diabetic education to help adjust insulin dosing as needed.    Hypertension: Stable. Patient is meeting blood pressure goal of < 130/80mmHg.    Hyperlipidemia: Stable.  Patient is on high intensity statin which is indicated based on 2019 ACC/AHA guidelines for lipid management.      Pulmonary Emphysema: Patient would benefit from using Symbicort as prescribed to help reduce the need for Combivent Respimat.    Pain: Stable.    Tremor: Stable.    Constipation: Stable.    Skin: Stable.    Supplements: Stable.    Immunizations: The patient is eligible for the following vaccines: a yearly influenza vaccine, a primary COVID-19 vaccine series (though she is not interested at this time), a tetanus booster vaccine, and a two-dose series of Shingrix.     PLAN:                            1. Try to use your Symbicort inhaler as prescribed (two puffs twice daily) as this can reduce how often you are using the Combivent Respimat inhaler.   2. You are eligible for the following vaccines: a yearly influenza vaccine, a primary COVID-19 vaccine series, a tetanus booster vaccine, and a two-dose series of Shingrix (the shingles vaccine). You can receive the vaccines of your choice at your convenience at your local pharmacy.  3. Continue to follow-up with diabetes education to ensure the Omnipod Dash system is getting adjusted appropriately.    Follow-up: 6 months, sooner if needed.    SUBJECTIVE/OBJECTIVE:                          Marly Cannon is a 57 year old female called for an initial visit. She was referred to me from her insurance company, [employer]      Reason for visit: Initial visit - comprehensive medication  review.    Allergies/ADRs: Reviewed in chart  Past Medical History: Reviewed in chart  Tobacco: She reports that she has been smoking cigarettes. She started smoking about 43 years ago. She has a 34.00 pack-year smoking history. She has never used smokeless tobacco.Nicotine/Tobacco Cessation Plan:   Information offered: Patient not interested at this time  Alcohol: none  Caffeine: 1 to 2 cups of coffee or soda per day.     Medication Adherence/Access: no issues reported  Patient uses pill box(es).  Patient takes medications 2 time(s) per day.   Per patient, misses medication 0 times per week.   Medication barriers: none.   The patient fills medications at [place]: NO, fills medications at [facility].    Type 2 Diabetes:  Currently taking OmniPod set up through diabetic education with Novolog, empagliflozin (Jardiance) 25 mg daily. Patient is not experiencing side effects.  Blood sugar monitoring: Continuous Glucose Monitor. Ranges (patient reported): See below  Fasting- ~higher 100s before she eats.  Symptoms of low blood sugar? none  Symptoms of high blood sugar? polyuria  Eye exam: up to date  Foot exam: up to date  Diet: dinner - hot dishes of different kinds.   Exercise: Not currently due to cold weather.   Aspirin: Taking 81mg daily and denies side effects   Statin: Yes: atorvastatin 40 mg daily.   ACEi/ARB: Yes: lisinopril 40 mg daily.   Urine Albumin:   Lab Results   Component Value Date    UMALCR 314.56 (H) 04/15/2022      Lab Results   Component Value Date    A1C 11.4 10/17/2022    A1C 10.4 07/15/2022    A1C 10.8 04/15/2022    A1C 8.6 01/14/2022    A1C 9.8 08/12/2021    A1C >14.0 05/10/2021    A1C 10.7 02/10/2021    A1C 7.6 11/09/2020    A1C 7.9 08/07/2020    A1C 8.1 05/04/2020     Hypertension: Current medications include amlodipine 5 mg daily, lisinopril 40 mg daily.  Patient does not self-monitor blood pressure. Patient reports no current medication side effects.  BP Readings from Last 3  Encounters:   10/17/22 128/74   09/28/22 124/81   08/22/22 116/78     Hyperlipidemia: Current therapy includes atorvastatin 40 mg daily at bedtime.  Patient reports no significant myalgias or other side effects.  The 10-year ASCVD risk score (Demetrice MATUTE, et al., 2019) is: 14.3%    Values used to calculate the score:      Age: 57 years      Sex: Female      Is Non- : No      Diabetic: Yes      Tobacco smoker: Yes      Systolic Blood Pressure: 128 mmHg      Is BP treated: Yes      HDL Cholesterol: 50 mg/dL      Total Cholesterol: 161 mg/dL  Recent Labs   Lab Test 07/15/22  0857 04/15/22  0950   CHOL 161 172   HDL 50 59   LDL 66 83   TRIG 224* 148     Pulmonary Emphysema: Current medications: Patient reports she is not taking Symbicort at this time.  Short-Acting Bronchodilator: Ipratropium/Albuterol MDI and pt reports using two times per day.     Patient is not experiencing side effects.   Patient reports the following symptoms: none.    Pain:  Current medications include: acetaminophen 325 mg every 6 hours as needed (how often? A few times per week). How is your pain? Fully effective control.  Are you able to do your normal activities? Can do everything I need to.  Are you able to sleep? Normal sleep. Patient reports the following side effects:  Denies Side Effects.    Tremor: Current medications include primidone 50 mg at bedtime. Patient reports that her tremors have been controlled well with this medication.     Constipation: Current medications include docusate 100 mg daily. Patient reports no trouble with constipation.     Skin: Current medications include ammonium lactate 12% cream topically twice daily, hydrocortisone 1% cream topically twice daily (daily twice per day), hydrocortisone 2.5% cream topically twice daily (just as needed), nystatin 285763 unit/gram powder topically three times daily as needed, urea 40% ointment topically twice daily (not currently taking, but has at home).  Patient reports no issues with skin.     Supplements: Current supplements include cholecalciferol 50 mcg daily. Patient reports no issues.     Immunizations:  Most Recent Immunizations   Administered Date(s) Administered     Flu, Unspecified 02/12/2018     Influenza (IIV3) PF 10/03/2014     Influenza Quad, Recombinant, pf(RIV4) (Flublok) 11/09/2020     Influenza Vaccine IM > 6 months Valent IIV4 (Alfuria,Fluzone) 10/23/2019     Influenza Vaccine, 6+MO IM (QUADRIVALENT W/PRESERVATIVES) 02/12/2018     Pneumococcal 23 valent 02/12/2018     Tdap (Adacel,Boostrix) 02/21/2011     Today's Vitals: There were no vitals taken for this visit.  ----------------  I spent 19 minutes with this patient today. I offer these suggestions for consideration by Amberly Whyte NP. A copy of the visit note was provided to the patient's provider(s).    The patient was mailed a summary of these recommendations.     John Chaudhary, PharmD  Medication Therapy Management Pharmacist  [facility]  Office phone: [phone]    Telemedicine Visit Details  Type of service:  Telephone visit  Start Time: 9:00 AM  End Time: 9:19 AM  Originating Location (pt. Location): Home      Distant Location (provider location):  On-site  Provider has received verbal consent for a visit from the patient? Yes     Medication Therapy Recommendations  Pulmonary emphysema (H)    Current Medication: budesonide-formoterol (SYMBICORT) 160-4.5 MCG/ACT Inhaler   Rationale: Patient prefers not to take - Adherence - Adherence   Recommendation: Provide Education   Status: Patient Agreed - Adherence/Education

## 2022-11-01 ENCOUNTER — VIRTUAL VISIT (OUTPATIENT)
Dept: PHARMACY | Facility: PHYSICIAN GROUP | Age: 57
End: 2022-11-01
Payer: COMMERCIAL

## 2022-11-01 DIAGNOSIS — J43.9 PULMONARY EMPHYSEMA, UNSPECIFIED EMPHYSEMA TYPE (H): ICD-10-CM

## 2022-11-01 DIAGNOSIS — I10 ESSENTIAL HYPERTENSION: ICD-10-CM

## 2022-11-01 DIAGNOSIS — K59.00 CONSTIPATION, UNSPECIFIED CONSTIPATION TYPE: ICD-10-CM

## 2022-11-01 DIAGNOSIS — E11.9 TYPE 2 DIABETES, HBA1C GOAL < 7% (H): Primary | ICD-10-CM

## 2022-11-01 DIAGNOSIS — Z71.85 IMMUNIZATION COUNSELING: ICD-10-CM

## 2022-11-01 DIAGNOSIS — R21 RASH: ICD-10-CM

## 2022-11-01 DIAGNOSIS — Z78.9 TAKES DIETARY SUPPLEMENTS: ICD-10-CM

## 2022-11-01 DIAGNOSIS — R25.1 TREMOR: ICD-10-CM

## 2022-11-01 DIAGNOSIS — E78.5 HYPERLIPIDEMIA, UNSPECIFIED HYPERLIPIDEMIA TYPE: ICD-10-CM

## 2022-11-01 DIAGNOSIS — R52 PAIN: ICD-10-CM

## 2022-11-01 PROCEDURE — 99607 MTMS BY PHARM ADDL 15 MIN: CPT

## 2022-11-01 PROCEDURE — 99605 MTMS BY PHARM NP 15 MIN: CPT

## 2022-11-01 NOTE — LETTER
November 2, 2022  Marlyausten Cannon  [address]  [place] [address]    Dear Ms. Cannon, [facility] [place] [facility]     Thank you for talking with me on Nov 1, 2022 about your health and medications. As a follow-up to our conversation, I have included two documents:      1. Your Recommended To-Do List has steps you should take to get the best results from your medications.  2. Your Medication List will help you keep track of your medications and how to take them.    If you want to talk about these documents, please call John Chaudhary RPH at phone: [phone], Monday-Friday 8-4:30pm.    I look forward to working with you and your doctors to make sure your medications work well for you.    Sincerely,  John Chaudhary RPH MT Pharmacist, [facility]

## 2022-11-01 NOTE — Clinical Note
Billy Gaming,  Please see my recommendations to Marly from my MTM visit with her. Please let me know if you have any questions!  John Chaudhary, PharmD Medication Therapy Management Pharmacist [facility] Office phone: [phone]

## 2022-11-01 NOTE — LETTER
_  Medication List        Prepared on: Nov 1, 2022     Bring your Medication List when you go to the doctor, hospital, or   emergency room. And, share it with your family or caregivers.     Note any changes to how you take your medications.  Cross out medications when you no longer use them.    Medication How I take it Why I use it Prescriber   Acetaminophen (TYLENOL PO) Take 325 mg by mouth every 6 hours as needed  Pain Patient Reported   amLODIPine (NORVASC) 5 MG tablet TAKE 1 TABLET BY MOUTH DAILY Essential Hypertension Amberly Whyte NP   ammonium lactate (AMLACTIN) 12 % external cream Apply topically 2 times daily Dry Skin  Patient Reported   aspirin (ASPIRIN LOW DOSE) 81 MG chewable tablet CHEW 1 TABLET BY MOUTH DAILY. DO NOT SPLIT OR CRUSH Essential Hypertension Amberly Whyte NP   atorvastatin (LIPITOR) 40 MG tablet TAKE 1 TABLET BY MOUTH AT BEDTIME Hyperlipidemia, unspecified hyperlipidemia type Leonel Stockton MD   budesonide-formoterol (SYMBICORT) 160-4.5 MCG/ACT Inhaler Inhale 2 puffs into the lungs 2 times daily Pulmonary emphysema, unspecified emphysema type (H) Amberly Whyte NP   Cholecalciferol (VITAMIN D3) 50 MCG (2000 UT) CAPS TAKE 1 CAPSULE BY MOUTH DAILY Vitamin D Deficiency Amberly Whyte NP   COMBIVENT RESPIMAT  MCG/ACT inhaler INHALE 1 PUFF INTO THE LUNGS 4 TIMES DAILY Pulmonary emphysema, unspecified emphysema type (H) Amberly Whyte NP   docusate sodium (COLACE) 100 MG capsule TAKE 1 CAPSULE BY MOUTH DAILY Constipation, unspecified constipation type Leonel Stockton MD   hydrocortisone (CORTAID) 1 % external cream Apply topically 2 times daily External Hemorrhoids Amberly Whyte NP   hydrocortisone 2.5 % cream Apply topically 2 times daily Rash Patient Reported   insulin aspart (NOVOLOG VIAL) 100 UNITS/ML vial Use as directed in Omnipod Dash, set up by diabetic education. Type 2 Diabetes Patient Reported   JARDIANCE 25 MG TABS tablet TAKE 1 TABLET BY MOUTH DAILY Type 2 Diabetes,  HbA1c Goal < 7% (H) Grace Valdes MD   lisinopril (ZESTRIL) 40 MG tablet TAKE 1 TABLET BY MOUTH DAILY Essential Hypertension Amberly Whyte NP   nystatin (MYCOSTATIN) 642783 UNIT/GM external powder Apply topically 3 times daily Candidiasis of breast Amberly Whyte NP   primidone (MYSOLINE) 50 MG tablet TAKE 1 TABLET BY MOUTH AT BEDTIME Essential Tremor Amberly Whyte NP   urea (GORDONS UREA) 40 % external ointment Apply topically 2 times daily Callus of Foot Amberly Whyte NP         Add new medications, over-the-counter drugs, herbals, vitamins, or  minerals in the blank rows below.    Medication How I take it Why I use it Prescriber                          Allergies:      No Known Allergies        Side effects I have had:               Other Information:              My notes and questions:

## 2022-11-01 NOTE — LETTER
"Recommended To-Do List      Prepared on: Nov 1, 2022       You can get the best results from your medications by completing the items on this \"To-Do List.\"      Bring your To-Do List when you go to your doctor. And, share it with your family or caregivers.    My To-Do List:  What we talked about: What I should do:   A new medication that may be of benefit to you    Get the following vaccine(s): a yearly influenza vaccine, a primary COVID-19 vaccine series, a tetanus booster vaccine, and a two-dose series of Shingrix (the shingles vaccine).           What we talked about: What I should do:   The importance of taking your medication as intended    Education: Try to take your Symbicort inhaler as prescribed, two puffs twice daily. This can help reduce the amount of Combivent Respimat that you use.           What we talked about: What I should do:                       "

## 2022-11-02 RX ORDER — INSULIN ASPART 100 [IU]/ML
INJECTION, SOLUTION INTRAVENOUS; SUBCUTANEOUS
COMMUNITY
End: 2022-11-23

## 2022-11-02 NOTE — PATIENT INSTRUCTIONS
"Recommendations from today's MTM visit:                                                    MTM (medication therapy management) is a service provided by a clinical pharmacist designed to help you get the most of out of your medicines.   Today we reviewed what your medicines are for, how to know if they are working, that your medicines are safe and how to make your medicine regimen as easy as possible.      1. Try to use your Symbicort inhaler as prescribed (two puffs twice daily) as this can reduce how often you are using the Combivent Respimat inhaler.   2. You are eligible for the following vaccines: a yearly influenza vaccine, a primary COVID-19 vaccine series, a tetanus booster vaccine, and a two-dose series of Shingrix (the shingles vaccine). You can receive the vaccines of your choice at your convenience at your local pharmacy.  3. Continue to follow-up with diabetes education to ensure the Omnipod Dash system is getting adjusted appropriately.    Follow-up: 6 months, sooner if needed.    It was great speaking with you today.  I value your experience and would be very thankful for your time in providing feedback in our clinic survey. In the next few days, you may receive an email or text message from [employer] with a link to a survey related to your  clinical pharmacist.\"     To schedule another MTM appointment, please call the clinic directly or you may call the MTM scheduling line at [phone] or toll-free at [phone].     My Clinical Pharmacist's contact information:                                                      Please feel free to contact me with any questions or concerns you have.      John Chaudhary, PharmD  Medication Therapy Management Pharmacist  [facility]  Office phone: [phone]   "

## 2022-11-08 DIAGNOSIS — E78.5 HYPERLIPIDEMIA, UNSPECIFIED HYPERLIPIDEMIA TYPE: ICD-10-CM

## 2022-11-10 RX ORDER — ATORVASTATIN CALCIUM 40 MG/1
TABLET, FILM COATED ORAL
Qty: 90 TABLET | Refills: 2 | Status: SHIPPED | OUTPATIENT
Start: 2022-11-10 | End: 2023-08-01

## 2022-11-15 DIAGNOSIS — K59.00 CONSTIPATION, UNSPECIFIED CONSTIPATION TYPE: ICD-10-CM

## 2022-11-17 RX ORDER — DOCUSATE SODIUM 100 MG/1
CAPSULE, LIQUID FILLED ORAL
Qty: 90 CAPSULE | Refills: 3 | Status: SHIPPED | OUTPATIENT
Start: 2022-11-17 | End: 2023-03-10

## 2022-11-22 DIAGNOSIS — I10 ESSENTIAL HYPERTENSION: ICD-10-CM

## 2022-11-23 ENCOUNTER — ALLIED HEALTH/NURSE VISIT (OUTPATIENT)
Dept: EDUCATION SERVICES | Facility: OTHER | Age: 57
End: 2022-11-23
Attending: NURSE PRACTITIONER
Payer: COMMERCIAL

## 2022-11-23 VITALS
HEIGHT: 64 IN | OXYGEN SATURATION: 92 % | HEART RATE: 91 BPM | DIASTOLIC BLOOD PRESSURE: 76 MMHG | BODY MASS INDEX: 34.37 KG/M2 | WEIGHT: 201.3 LBS | SYSTOLIC BLOOD PRESSURE: 106 MMHG | RESPIRATION RATE: 16 BRPM

## 2022-11-23 DIAGNOSIS — Z79.4 TYPE 2 DIABETES MELLITUS WITH HYPERGLYCEMIA, WITH LONG-TERM CURRENT USE OF INSULIN (H): ICD-10-CM

## 2022-11-23 DIAGNOSIS — F17.200 NICOTINE DEPENDENCE, UNCOMPLICATED, UNSPECIFIED NICOTINE PRODUCT TYPE: Primary | ICD-10-CM

## 2022-11-23 DIAGNOSIS — E11.65 TYPE 2 DIABETES MELLITUS WITH HYPERGLYCEMIA, WITH LONG-TERM CURRENT USE OF INSULIN (H): ICD-10-CM

## 2022-11-23 PROCEDURE — 99214 OFFICE O/P EST MOD 30 MIN: CPT | Mod: 25 | Performed by: NURSE PRACTITIONER

## 2022-11-23 PROCEDURE — 95251 CONT GLUC MNTR ANALYSIS I&R: CPT | Performed by: NURSE PRACTITIONER

## 2022-11-23 PROCEDURE — G0463 HOSPITAL OUTPT CLINIC VISIT: HCPCS

## 2022-11-23 RX ORDER — INSULIN ASPART 100 [IU]/ML
INJECTION, SOLUTION INTRAVENOUS; SUBCUTANEOUS
Qty: 30 ML | Refills: 3 | Status: SHIPPED | OUTPATIENT
Start: 2022-11-23 | End: 2023-07-05

## 2022-11-23 RX ORDER — LISINOPRIL 40 MG/1
TABLET ORAL
Qty: 90 TABLET | Refills: 0 | Status: SHIPPED | OUTPATIENT
Start: 2022-11-23 | End: 2023-02-28

## 2022-11-23 ASSESSMENT — PAIN SCALES - GENERAL: PAINLEVEL: NO PAIN (0)

## 2022-11-23 NOTE — TELEPHONE ENCOUNTER
lisinoppril      Last Written Prescription Date:  5/27/22  Last Fill Quantity: 90,   # refills: 1  Last Office Visit: 10/17/22  Future Office visit:    Next 5 appointments (look out 90 days)    Jan 17, 2023  3:10 PM  (Arrive by 2:55 PM)  SHORT with Amberly Whyte NP  [facility] [place] ([facility] [place] ) [address]  [place] [address]  [phone]           Routing refill request to provider for review/approval because:

## 2022-11-24 NOTE — PROGRESS NOTES
SUBJECTIVE:  Marly Cannon, 57 year old, female presents with the following Chief Complaint(s) with HPI to follow:  Chief Complaint   Patient presents with     Diabetes        Diabetes Follow-up    Patient is checking blood sugars: >4x/day using her personal CGM (Freestyle Bud).  Results:    Date: 11/10 to 11/23/22  Glucose management indicator: n/a    Average glucose: 306    Glucose range  Very low (<54): 0  low (<70): 0  In target range (): 3%  High (>180): 29%  Very high (>250): 68%        Symptoms of hypoglycemia (low blood sugar): no    Paresthesias (numbness or burning in feet) or sores: no, no sores    Diabetic eye exam within the last year: UTD    Breakfast eaten regularly: most of the time    Patient counting carbs: Yes    Exercising: depends on the weather          HPI:  Marly's here today for the follow up regarding her Diabetes mellitus, Type 2.    A1c  Lab Results   Component Value Date    A1C 11.4 10/17/2022    A1C 10.4 07/15/2022    A1C 10.8 04/15/2022    A1C 8.6 01/14/2022    A1C 9.8 08/12/2021    A1C >14.0 05/10/2021    A1C 10.7 02/10/2021    A1C 7.6 11/09/2020    A1C 7.9 08/07/2020    A1C 8.1 05/04/2020     Current Diabetes medication:   1. Omnipod dash, using Novolog  2. Jardiance 25 mg daily  Statin use: yes, simvastatin 20 mg daily  ASA: yes, 81 mg daily    Marly's here a download of her CGM and insulin pump with possible diabetic medication adjustments.   Reports the following:  No issues with the Freestyle Bud   No issues with the Omnipod.      Denies any new health concerns.     Weight trend   Wt Readings from Last 4 Encounters:   11/23/22 91.3 kg (201 lb 4.8 oz)   10/17/22 88 kg (194 lb)   09/28/22 89.4 kg (197 lb)   08/22/22 87.6 kg (193 lb 3.2 oz)         Patient Active Problem List   Diagnosis     Type 2 diabetes, HbA1c goal < 7% (H)     ACP (advance care planning)     Stage 3 chronic kidney disease (H)     Pulmonary emphysema (H)     Hyperparathyroidism due to renal  insufficiency (H)     Vitamin D deficiency     Intellectual disability     Breast fibrocystic disorder     Tobacco abuse     Microalbuminuria due to type 2 diabetes mellitus (H)     H/O colonoscopy     [place] cardiac risk <10% in next 10 years     Tubular adenoma of colon     Hyperlipidemia, unspecified hyperlipidemia type     Essential hypertension     Callus of foot     Memory disturbance     ADEEL (obstructive sleep apnea)     Tremor       History reviewed. No pertinent past medical history.    Past Surgical History:   Procedure Laterality Date     BIOPSY BREAST Left 02/14/2008    patient states no bx, Clip in left breast/ stereo bxc 2-- no path in EPIC that far back     COLONOSCOPY N/A 08/20/2015    Procedure: COLONOSCOPY;  Surgeon: Gentry Porter MD;  Location: [facility]     COLONOSCOPY N/A 09/21/2018    Procedure: COLONOSCOPY;  COLONOSCOPY WITH POLYPECTOMY;  Surgeon: Gentry Porter MD;  Location: [facility] OR       Family History   Problem Relation Age of Onset     Diabetes Mother      Diabetes Father      Hypertension Brother      Diabetes Sister        Social History     Tobacco Use     Smoking status: Every Day     Packs/day: 1.00     Years: 34.00     Pack years: 34.00     Types: Cigarettes     Start date: 1/1/1979     Smokeless tobacco: Never     Tobacco comments:     Declined QP 05/13/2020   Substance Use Topics     Alcohol use: No     Alcohol/week: 0.0 standard drinks       Current Outpatient Medications   Medication Sig Dispense Refill     Acetaminophen (TYLENOL PO) Take 325 mg by mouth every 6 hours as needed        amLODIPine (NORVASC) 5 MG tablet TAKE 1 TABLET BY MOUTH DAILY 90 tablet 1     ammonium lactate (AMLACTIN) 12 % external cream Apply topically 2 times daily       aspirin (ASPIRIN LOW DOSE) 81 MG chewable tablet CHEW 1 TABLET BY MOUTH DAILY. DO NOT SPLIT OR CRUSH 90 tablet 2     atorvastatin (LIPITOR) 40 MG tablet TAKE 1 TABLET BY MOUTH AT BEDTIME 90 tablet 2     Cholecalciferol  (VITAMIN D3) 50 MCG (2000 UT) CAPS TAKE 1 CAPSULE BY MOUTH DAILY 90 capsule 3     COMBIVENT RESPIMAT  MCG/ACT inhaler INHALE 1 PUFF INTO THE LUNGS 4 TIMES DAILY 4 g 3     Continuous Blood Gluc Sensor (FREESTYLE HANK 2 SENSOR) MISC 1 each every 14 days Use 1 sensor every 14 days. Use to read blood sugars per 's instructions. 2 each 5     docusate sodium (COLACE) 100 MG capsule TAKE 1 CAPSULE BY MOUTH DAILY 90 capsule 3     hydrocortisone (CORTAID) 1 % external cream Apply topically 2 times daily 453.6 g 0     insulin aspart (NOVOLOG VIAL) 100 UNITS/ML vial To be used with the insulin pump.  TDD: 70 units 30 mL 3     insulin pen needle (32G X 4 MM) 32G X 4 MM miscellaneous Use 1 pen needles daily 100 each 3     JARDIANCE 25 MG TABS tablet TAKE 1 TABLET BY MOUTH DAILY 30 tablet 4     OneTouch Delica Lancets 33G MISC Inject 1 each Subcutaneous 3 times daily (before meals) 100 each 11     ONETOUCH ULTRA test strip USE TO TEST BLOOD SUGAR 4 TIMES DAILY 100 strip 1     order for [facility] briefs 50 each 1     primidone (MYSOLINE) 50 MG tablet TAKE 1 TABLET BY MOUTH AT BEDTIME 30 tablet 11     budesonide-formoterol (SYMBICORT) 160-4.5 MCG/ACT Inhaler Inhale 2 puffs into the lungs 2 times daily (Patient not taking: Reported on 11/1/2022) 1 Inhaler 3     hydrocortisone 2.5 % cream Apply topically 2 times daily       insulin glargine U-300 (TOUJEO SOLOSTAR) 300 UNIT/ML (1 units dial) pen Inject 20 Units Subcutaneous At Bedtime (Patient not taking: Reported on 11/1/2022) 9 mL 1     lisinopril (ZESTRIL) 40 MG tablet TAKE 1 TABLET BY MOUTH DAILY 90 tablet 0     nystatin (MYCOSTATIN) 778072 UNIT/GM external powder Apply topically 3 times daily 60 g 3     urea (GORDONS UREA) 40 % external ointment Apply topically 2 times daily (Patient not taking: Reported on 11/1/2022) 30 g 0       No Known Allergies    REVIEW OF SYSTEMS  Skin: negative  Eyes: negative, wears glasses    Ears/Nose/Throat: negative  Respiratory:  "No shortness of breath or hemoptysis; smoker's cough; hx of sleep apnea  Cardiovascular: negative  Gastrointestinal: negative  Genitourinary: negative  Musculoskeletal: negative; hx of left shoulder pain and left knee--depends on the weather   Neurologic: negative  Psychiatric: negative  Hematologic/Lymphatic/Immunologic: negative  Endocrine: positive for diabetes     OBJECTIVE:  /76   Pulse 91   Resp 16   Ht 1.626 m (5' 4\")   Wt 91.3 kg (201 lb 4.8 oz)   SpO2 92%   BMI 34.55 kg/m    Constitutional: alert and no distress  Respiratory:  Good diaphragmatic excursion.   Musculoskeletal: extremities normal- no gross deformities noted and gait normal  Skin: no suspicious lesions or rashes to visible skin  Psychiatric: mentation appears normal and affect normal/bright      LABS  No results found for any visits on 11/23/22.    ASSESSMENT / PLAN:  (F17.200) Nicotine dependence, uncomplicated, unspecified nicotine product type  (primary encounter diagnosis)  Comment: noted, refused  Plan:   Spoke with patient regarding options for smoking cessation.  Various pharmacologic options and behavioral techniques were reviewed.  The relative effectiveness as well as risks and benefits were reviewed with patient.  Patient is interested in: No interventions    Smoking Cessation    Marly's aware to let us know when she's ready to quit smoking.  Discussed the dangers of smoking with diabetes    (E11.65,  Z79.4) Type 2 diabetes mellitus with hyperglycemia, with long-term current use of insulin (H)  Comment: noted CGM and omnipod download    Insulin  Basal: 19.8 (100%)  Bolus: 0  TDD: 20 units    Insulin pump: Omnipod Dash  Updated: 11/23/22  Basal: 0.9 (new)  Total basal: 21.6 units  CR: 12  ISF: 45  BG target: 100  Active insulin: 4 hours    Plan: insulin aspart (NOVOLOG VIAL) 100 UNITS/ML         vial, GLUCOSE MONITOR, 72 HOUR, PHYS INTERP          As noted in the download, Marly isn't using her bolus " feature    Education focused on this    Reminded her that she need to enter a bolus BEFORE she consumes carbs--every time!    Denies any questions    Follow up  One month    Call sooner if any issues    Patient Instructions   Continue working on healthy eating and moving (start low and slow, work up to 30 min, 5x/week)    BG goals:  Fasting and before meals <130, >70  2 hour after eating <180    We only need 1/2 of these numbers to be within target then your A1c will be within target    Medication changes   Watch for low BGs after insulin pump adjustment    Follow up   One month    Call me sooner if any problems/concerns and/or questions develop including consistent low BGs <70 or consistent high BGs >200  [phone] (Unit Coordinator)    [phone] (Anna, Nurse)    Smoking:   Try keep working on reducing the amount of cigarettes                 Time: 30 minutes  Barrier: see PMH  Willingness to learn: accepting    Courtney PINTO [facility]  Diabetes and Wound Care    Cc: Amberly Whyte NP    30 minutes was spent with patient.    All of this time was spent on counseling patient regarding illness, medication and/or treatment options, coordinating further cares and follow ups that are needed along with resource material that will be helpful in the treatment of these issues.         With the electronic record, we can now more quickly and easily track our patient diabetic goals. Our diabetes clinical review is in progress and these are the indicators we are monitoring for good diabetes health.     1.) HbA1C less than 7 (measurement of your average blood sugars)  2.) Blood Pressure less than 140/80  3.) LDL less than 100 (bad cholesterol)  4.) HbA1C is checked in the last 6 months and below 7% (more frequently if not at goal or adjusting medications)  5.) LDL is checked in the last 12 months (more frequently if not at goal or adjusting medications)  6.) Taking one baby aspirin daily (unless otherwise instructed)  7.)  No tobacco use  8) Statin use     You have achieved 6 out of 8 of these and I am encouraging you to come in and get tuned up to achieve 8 out of 8!  Here is what you have achieved so far in my goals for you:  1.) HbA1C  less than 7:                              NO  Your last  HbA1C :  Lab Results   Component Value Date    A1C 11.4 10/17/2022    A1C 10.4 07/15/2022    A1C 10.8 04/15/2022    A1C 8.6 01/14/2022    A1C 9.8 08/12/2021    A1C >14.0 05/10/2021    A1C 10.7 02/10/2021    A1C 7.6 11/09/2020    A1C 7.9 08/07/2020    A1C 8.1 05/04/2020     2.) Blood Pressure less than 140/80:       YES      Your last    BP Readings from Last 1 Encounters:   11/23/22 106/76     3.) LDL less than 100:                              YES      Your last     LDL Cholesterol Calculated   Date Value Ref Range Status   07/15/2022 66 <=100 mg/dL Final   11/09/2020 74 <100 mg/dL Final     Comment:     Desirable:       <100 mg/dl       4.) Checked HbA1C in the past 6 months: YES      5.) Checked LDL in the past 12 months:    YES      6.) Taking one aspirin daily:                       YES     7.) No tobacco use:                                        NO   8.) Statin use      YES

## 2022-11-24 NOTE — PATIENT INSTRUCTIONS
Continue working on healthy eating and moving (start low and slow, work up to 30 min, 5x/week)    BG goals:  Fasting and before meals <130, >70  2 hour after eating <180    We only need 1/2 of these numbers to be within target then your A1c will be within target    Medication changes   Watch for low BGs after insulin pump adjustment    Follow up   One month    Call me sooner if any problems/concerns and/or questions develop including consistent low BGs <70 or consistent high BGs >200  [phone] (Unit Coordinator)    [phone] (Anna, Nurse)    Smoking:   Try keep working on reducing the amount of cigarettes

## 2022-11-29 DIAGNOSIS — I10 ESSENTIAL HYPERTENSION: ICD-10-CM

## 2022-11-29 DIAGNOSIS — Z53.9 PERSONS ENCOUNTERING HEALTH SERVICES FOR SPECIFIC PROCEDURES, NOT CARRIED OUT: Primary | ICD-10-CM

## 2022-11-30 RX ORDER — AMLODIPINE BESYLATE 5 MG/1
TABLET ORAL
Qty: 90 TABLET | Refills: 0 | Status: SHIPPED | OUTPATIENT
Start: 2022-11-30 | End: 2023-03-01

## 2022-11-30 NOTE — TELEPHONE ENCOUNTER
amLODIPine      Last Written Prescription Date:  5/27/22  Last Fill Quantity: 90,   # refills: 1  Last Office Visit: 10/17/22  Future Office visit:    Next 5 appointments (look out 90 days)    Jan 17, 2023  3:10 PM  (Arrive by 2:55 PM)  SHORT with Amberly Whyte NP  [facility] [place] ([facility] [place] ) [address]  [place] [address]  [phone]           Routing refill request to provider for review/approval because:

## 2022-12-12 ENCOUNTER — TELEPHONE (OUTPATIENT)
Dept: EDUCATION SERVICES | Facility: OTHER | Age: 57
End: 2022-12-12

## 2022-12-12 NOTE — TELEPHONE ENCOUNTER
"Just put a new omni pod on Saturday.  Machine will not read there is a pod. States \"no Pod\" on devise. Advised she call the number for customer service on her devise. Agrees to plan will call back if needed.   "

## 2023-01-05 ENCOUNTER — ALLIED HEALTH/NURSE VISIT (OUTPATIENT)
Dept: EDUCATION SERVICES | Facility: OTHER | Age: 58
End: 2023-01-05
Attending: NURSE PRACTITIONER
Payer: COMMERCIAL

## 2023-01-05 VITALS
HEIGHT: 64 IN | DIASTOLIC BLOOD PRESSURE: 76 MMHG | SYSTOLIC BLOOD PRESSURE: 126 MMHG | OXYGEN SATURATION: 93 % | BODY MASS INDEX: 34.5 KG/M2 | HEART RATE: 91 BPM | WEIGHT: 202.1 LBS

## 2023-01-05 DIAGNOSIS — Z79.4 TYPE 2 DIABETES MELLITUS WITH HYPERGLYCEMIA, WITH LONG-TERM CURRENT USE OF INSULIN (H): Primary | ICD-10-CM

## 2023-01-05 DIAGNOSIS — E11.65 TYPE 2 DIABETES MELLITUS WITH HYPERGLYCEMIA, WITH LONG-TERM CURRENT USE OF INSULIN (H): Primary | ICD-10-CM

## 2023-01-05 DIAGNOSIS — F17.200 NICOTINE DEPENDENCE, UNCOMPLICATED, UNSPECIFIED NICOTINE PRODUCT TYPE: ICD-10-CM

## 2023-01-05 PROCEDURE — 99214 OFFICE O/P EST MOD 30 MIN: CPT | Mod: 25 | Performed by: NURSE PRACTITIONER

## 2023-01-05 PROCEDURE — G0463 HOSPITAL OUTPT CLINIC VISIT: HCPCS

## 2023-01-05 PROCEDURE — 95251 CONT GLUC MNTR ANALYSIS I&R: CPT | Performed by: NURSE PRACTITIONER

## 2023-01-05 RX ORDER — INSULIN PUMP CONTROLLER
1 EACH MISCELLANEOUS
Qty: 15 EACH | Refills: 5 | Status: SHIPPED | OUTPATIENT
Start: 2023-01-05 | End: 2023-07-05

## 2023-01-05 ASSESSMENT — PAIN SCALES - GENERAL: PAINLEVEL: NO PAIN (0)

## 2023-01-05 NOTE — PATIENT INSTRUCTIONS
Continue working on healthy eating and moving (start low and slow, work up to 30 min, 5x/week)    BG goals:  Fasting and before meals <130, >70  2 hour after eating <180    We only need 1/2 of these numbers to be within target then your A1c will be within target    Medication changes   Watch for low BGs after insulin pump adjustment    Follow up   Download when you see Amberly Whyte NP  1 month with me    Call me sooner if any problems/concerns and/or questions develop including consistent low BGs <70 or consistent high BGs >200  [phone] (Unit Coordinator)    [phone] (Anna Nurse)    Smoking:   Try keep working on reducing the amount of cigarettes

## 2023-01-05 NOTE — PROGRESS NOTES
SUBJECTIVE:  Marly Cannon, 57 year old, female presents with the following Chief Complaint(s) with HPI to follow:  Chief Complaint   Patient presents with     Diabetes Education     2 month follow up        Diabetes Follow-up    Patient is checking blood sugars: >4x/day using her personal CGM (Freestyle Bud).  Results:    Date: 12/23 to 1/5/223  Glucose management indicator: n/a    Average glucose: 391    Glucose range  Very low (<54): 0  low (<70): 0  In target range (): 0  High (>180): 0  Very high (>250): 100%          Symptoms of hypoglycemia (low blood sugar): no    Paresthesias (numbness or burning in feet) or sores: no, no sores    Diabetic eye exam within the last year: UTD    Breakfast eaten regularly: most of the time    Patient counting carbs: Yes    Exercising: depends on the weather          HPI:  Marly's here today for the follow up regarding her Diabetes mellitus, Type 2.    A1c  Lab Results   Component Value Date    A1C 11.4 10/17/2022    A1C 10.4 07/15/2022    A1C 10.8 04/15/2022    A1C 8.6 01/14/2022    A1C 9.8 08/12/2021    A1C >14.0 05/10/2021    A1C 10.7 02/10/2021    A1C 7.6 11/09/2020    A1C 7.9 08/07/2020    A1C 8.1 05/04/2020     Current Diabetes medication:   1. Omnipod dash, using Novolog  2. Jardiance 25 mg daily  Statin use: yes, simvastatin 20 mg daily  ASA: yes, 81 mg daily    Marly's here a download of her CGM and insulin pump with possible diabetic medication adjustments.   Reports the following:  No issues with the Freestyle Bud   Issues with the Omnipod Dash  States problems with connecting to the pod.        Otherwise, denies any new health concerns.     Weight trend   Wt Readings from Last 4 Encounters:   01/05/23 91.7 kg (202 lb 1.6 oz)   11/23/22 91.3 kg (201 lb 4.8 oz)   10/17/22 88 kg (194 lb)   09/28/22 89.4 kg (197 lb)         Patient Active Problem List   Diagnosis     Type 2 diabetes, HbA1c goal < 7% (H)     ACP (advance care planning)     Stage 3 chronic  kidney disease (H)     Pulmonary emphysema (H)     Hyperparathyroidism due to renal insufficiency (H)     Vitamin D deficiency     Intellectual disability     Breast fibrocystic disorder     Tobacco abuse     Microalbuminuria due to type 2 diabetes mellitus (H)     H/O colonoscopy     [place] cardiac risk <10% in next 10 years     Tubular adenoma of colon     Hyperlipidemia, unspecified hyperlipidemia type     Essential hypertension     Callus of foot     Memory disturbance     ADEEL (obstructive sleep apnea)     Tremor       History reviewed. No pertinent past medical history.    Past Surgical History:   Procedure Laterality Date     BIOPSY BREAST Left 02/14/2008    patient states no bx, Clip in left breast/ stereo bxc 2-- no path in EPIC that far back     COLONOSCOPY N/A 08/20/2015    Procedure: COLONOSCOPY;  Surgeon: Gentry Porter MD;  Location: [facility]     COLONOSCOPY N/A 09/21/2018    Procedure: COLONOSCOPY;  COLONOSCOPY WITH POLYPECTOMY;  Surgeon: Gentry Porter MD;  Location: [facility]       Family History   Problem Relation Age of Onset     Diabetes Mother      Diabetes Father      Hypertension Brother      Diabetes Sister        Social History     Tobacco Use     Smoking status: Every Day     Packs/day: 1.00     Years: 34.00     Pack years: 34.00     Types: Cigarettes     Start date: 1/1/1979     Smokeless tobacco: Never     Tobacco comments:     Declined QP 05/13/2020   Substance Use Topics     Alcohol use: No     Alcohol/week: 0.0 standard drinks       Current Outpatient Medications   Medication Sig Dispense Refill     Acetaminophen (TYLENOL PO) Take 325 mg by mouth every 6 hours as needed        amLODIPine (NORVASC) 5 MG tablet TAKE 1 TABLET BY MOUTH DAILY 90 tablet 0     ammonium lactate (AMLACTIN) 12 % external cream Apply topically 2 times daily       aspirin (ASPIRIN LOW DOSE) 81 MG chewable tablet CHEW 1 TABLET BY MOUTH DAILY. DO NOT SPLIT OR CRUSH 90 tablet 2     atorvastatin  (LIPITOR) 40 MG tablet TAKE 1 TABLET BY MOUTH AT BEDTIME 90 tablet 2     budesonide-formoterol (SYMBICORT) 160-4.5 MCG/ACT Inhaler Inhale 2 puffs into the lungs 2 times daily 1 Inhaler 3     Cholecalciferol (VITAMIN D3) 50 MCG (2000 UT) CAPS TAKE 1 CAPSULE BY MOUTH DAILY 90 capsule 3     COMBIVENT RESPIMAT  MCG/ACT inhaler INHALE 1 PUFF INTO THE LUNGS 4 TIMES DAILY 4 g 3     Continuous Blood Gluc Sensor (FREESTYLE HANK 2 SENSOR) MISC 1 each every 14 days Use 1 sensor every 14 days. Use to read blood sugars per 's instructions. 2 each 5     docusate sodium (COLACE) 100 MG capsule TAKE 1 CAPSULE BY MOUTH DAILY 90 capsule 3     hydrocortisone (CORTAID) 1 % external cream Apply topically 2 times daily 453.6 g 0     hydrocortisone 2.5 % cream Apply topically 2 times daily       insulin aspart (NOVOLOG VIAL) 100 UNITS/ML vial To be used with the insulin pump.  TDD: 70 units 30 mL 3     Insulin Disposable Pump (OMNIPOD DASH PODS, GEN 4,) MISC 1 each every 48 hours 15 each 5     insulin pen needle (32G X 4 MM) 32G X 4 MM miscellaneous Use 1 pen needles daily 100 each 3     INSULIN PUMP - OUTPATIENT Insulin pump: Omnipod Dash  Updated: 11/23/22  Basal: 0.9 (new)  Total basal: 21.6 units  CR: 12  ISF: 45  BG target: 100  Active insulin: 4 hours       JARDIANCE 25 MG TABS tablet TAKE 1 TABLET BY MOUTH DAILY 30 tablet 4     lisinopril (ZESTRIL) 40 MG tablet TAKE 1 TABLET BY MOUTH DAILY 90 tablet 0     nystatin (MYCOSTATIN) 839976 UNIT/GM external powder Apply topically 3 times daily 60 g 3     OneTouch Delica Lancets 33G MISC Inject 1 each Subcutaneous 3 times daily (before meals) 100 each 11     ONETOUCH ULTRA test strip USE TO TEST BLOOD SUGAR 4 TIMES DAILY 100 strip 1     order for DME Women briefs 50 each 1     primidone (MYSOLINE) 50 MG tablet TAKE 1 TABLET BY MOUTH AT BEDTIME 30 tablet 11     urea (GORDONS UREA) 40 % external ointment Apply topically 2 times daily 30 g 0     insulin glargine U-300  "(TOUJEO SOLOSTAR) 300 UNIT/ML (1 units dial) pen Inject 20 Units Subcutaneous At Bedtime (Patient not taking: Reported on 11/1/2022) 9 mL 1       No Known Allergies    REVIEW OF SYSTEMS  Skin: negative  Eyes: negative, wears glasses    Ears/Nose/Throat: negative  Respiratory: No shortness of breath or hemoptysis; smoker's cough; hx of sleep apnea  Cardiovascular: negative  Gastrointestinal: negative  Genitourinary: negative  Musculoskeletal: negative; hx of left shoulder pain and left knee--depends on the weather   Neurologic: negative  Psychiatric: negative  Hematologic/Lymphatic/Immunologic: negative  Endocrine: positive for diabetes     OBJECTIVE:  /76 (BP Location: Left arm, Patient Position: Sitting, Cuff Size: Adult Regular)   Pulse 91   Ht 1.626 m (5' 4\")   Wt 91.7 kg (202 lb 1.6 oz)   SpO2 93%   BMI 34.69 kg/m    Constitutional: alert and no distress  Respiratory:  Good diaphragmatic excursion.   Musculoskeletal: extremities normal- no gross deformities noted and gait normal  Skin: no suspicious lesions or rashes to visible skin  Psychiatric: mentation appears normal and affect normal/bright      LABS  No results found for any visits on 01/05/23.    ASSESSMENT / PLAN:  (E11.65,  Z79.4) Type 2 diabetes mellitus with hyperglycemia, with long-term current use of insulin (H)  (primary encounter diagnosis)  Comment: noted Omnipod Dash isn't connecting to her PDM.   Plan: Continuous Blood Gluc Sensor (FREESTYLE HANK 2        SENSOR) MISC, Insulin Disposable Pump (OMNIPOD         DASH PODS, GEN 4,) MISC, GLUCOSE MONITOR, 72         HOUR, PHYS INTERP          Restarted another pod in the office.   This one connected    No changes with her insulin regime.    BGs when wearing the Omnipod were 108-156    (F17.200) Nicotine dependence, uncomplicated, unspecified nicotine product type  Comment: noted, refused  Plan:   Spoke with patient regarding options for smoking cessation.  Various pharmacologic options " and behavioral techniques were reviewed.  The relative effectiveness as well as risks and benefits were reviewed with patient.  Patient is interested in: No interventions    Smoking Cessation    Marly's aware to let us know when she's ready to quit smoking.  Discussed the dangers of smoking with diabetes    Follow up  Let me know if there's continued issues with connectivity    One month    Call sooner if any issues    Patient Instructions   Continue working on healthy eating and moving (start low and slow, work up to 30 min, 5x/week)    BG goals:  Fasting and before meals <130, >70  2 hour after eating <180    We only need 1/2 of these numbers to be within target then your A1c will be within target    Medication changes   Watch for low BGs after insulin pump adjustment    Follow up   Download when you see Amberly Whyte NP  1 month with me    Call me sooner if any problems/concerns and/or questions develop including consistent low BGs <70 or consistent high BGs >200  [phone] (Unit Coordinator)    [phone] (Anna, Nurse)    Smoking:   Try keep working on reducing the amount of cigarettes                 Time: 30 minutes  Barrier: see [facility]  Willingness to learn: accepting    Courtney PINTO [facility]  Diabetes and Wound Care    Cc: Abmerly Whyte NP    30 minutes was spent with patient.    All of this time was spent on counseling patient regarding illness, medication and/or treatment options, coordinating further cares and follow ups that are needed along with resource material that will be helpful in the treatment of these issues.         With the electronic record, we can now more quickly and easily track our patient diabetic goals. Our diabetes clinical review is in progress and these are the indicators we are monitoring for good diabetes health.     1.) HbA1C less than 7 (measurement of your average blood sugars)  2.) Blood Pressure less than 140/80  3.) LDL less than 100 (bad cholesterol)  4.) HbA1C is  checked in the last 6 months and below 7% (more frequently if not at goal or adjusting medications)  5.) LDL is checked in the last 12 months (more frequently if not at goal or adjusting medications)  6.) Taking one baby aspirin daily (unless otherwise instructed)  7.) No tobacco use  8) Statin use     You have achieved 6 out of 8 of these and I am encouraging you to come in and get tuned up to achieve 8 out of 8!  Here is what you have achieved so far in my goals for you:  1.) HbA1C  less than 7:                              NO  Your last  HbA1C :  Lab Results   Component Value Date    A1C 11.4 10/17/2022    A1C 10.4 07/15/2022    A1C 10.8 04/15/2022    A1C 8.6 01/14/2022    A1C 9.8 08/12/2021    A1C >14.0 05/10/2021    A1C 10.7 02/10/2021    A1C 7.6 11/09/2020    A1C 7.9 08/07/2020    A1C 8.1 05/04/2020     2.) Blood Pressure less than 140/80:       YES      Your last    BP Readings from Last 1 Encounters:   01/05/23 126/76     3.) LDL less than 100:                              YES      Your last     LDL Cholesterol Calculated   Date Value Ref Range Status   07/15/2022 66 <=100 mg/dL Final   11/09/2020 74 <100 mg/dL Final     Comment:     Desirable:       <100 mg/dl       4.) Checked HbA1C in the past 6 months: YES      5.) Checked LDL in the past 12 months:    YES      6.) Taking one aspirin daily:                       YES     7.) No tobacco use:                                        NO   8.) Statin use      YES

## 2023-01-06 ENCOUNTER — TELEPHONE (OUTPATIENT)
Dept: EDUCATION SERVICES | Facility: OTHER | Age: 58
End: 2023-01-06

## 2023-01-06 NOTE — TELEPHONE ENCOUNTER
She needs to call Omnipod customer support.     I'm assuming there's an issue with her PDM (personal diabetic manager device)     [facility]/Customer service   [phone]     Thank you!     Courtney      Called patient and gave her the recommendation from Courtney along with the phone number she will need and was given a read back from patient

## 2023-01-06 NOTE — TELEPHONE ENCOUNTER
Patient calls with concern from her visit yesterday, she received a refill of her omnipod but Courtney had given her one at the clinic to apply and it worked at the clinic but at night it showed NO POD  explianed to patient that provider was not in clinic today but would send information  PLEASE ADVISE

## 2023-01-17 ENCOUNTER — TELEPHONE (OUTPATIENT)
Dept: FAMILY MEDICINE | Facility: OTHER | Age: 58
End: 2023-01-17

## 2023-01-17 ENCOUNTER — OFFICE VISIT (OUTPATIENT)
Dept: FAMILY MEDICINE | Facility: OTHER | Age: 58
End: 2023-01-17
Attending: NURSE PRACTITIONER
Payer: COMMERCIAL

## 2023-01-17 VITALS
OXYGEN SATURATION: 92 % | HEART RATE: 82 BPM | DIASTOLIC BLOOD PRESSURE: 70 MMHG | SYSTOLIC BLOOD PRESSURE: 118 MMHG | HEIGHT: 64 IN | WEIGHT: 197.3 LBS | BODY MASS INDEX: 33.68 KG/M2 | TEMPERATURE: 97.6 F

## 2023-01-17 DIAGNOSIS — E08.42 DIABETIC POLYNEUROPATHY ASSOCIATED WITH DIABETES MELLITUS DUE TO UNDERLYING CONDITION (H): ICD-10-CM

## 2023-01-17 DIAGNOSIS — I10 ESSENTIAL HYPERTENSION: ICD-10-CM

## 2023-01-17 DIAGNOSIS — R73.9 HYPERGLYCEMIA: ICD-10-CM

## 2023-01-17 DIAGNOSIS — E78.5 HYPERLIPIDEMIA, UNSPECIFIED HYPERLIPIDEMIA TYPE: ICD-10-CM

## 2023-01-17 DIAGNOSIS — D58.2 ELEVATED HEMOGLOBIN (H): ICD-10-CM

## 2023-01-17 DIAGNOSIS — E11.9 TYPE 2 DIABETES, HBA1C GOAL < 7% (H): Primary | ICD-10-CM

## 2023-01-17 DIAGNOSIS — E83.52 HYPERCALCEMIA: ICD-10-CM

## 2023-01-17 DIAGNOSIS — J43.9 PULMONARY EMPHYSEMA, UNSPECIFIED EMPHYSEMA TYPE (H): ICD-10-CM

## 2023-01-17 DIAGNOSIS — E11.29 MICROALBUMINURIA DUE TO TYPE 2 DIABETES MELLITUS (H): ICD-10-CM

## 2023-01-17 DIAGNOSIS — D77 OTHER DISORDERS OF BLOOD AND BLOOD-FORMING ORGANS IN DISEASES CLASSIFIED ELSEWHERE: ICD-10-CM

## 2023-01-17 DIAGNOSIS — D72.828 OTHER ELEVATED WHITE BLOOD CELL COUNT: ICD-10-CM

## 2023-01-17 DIAGNOSIS — Z72.0 TOBACCO ABUSE: ICD-10-CM

## 2023-01-17 DIAGNOSIS — R80.9 MICROALBUMINURIA DUE TO TYPE 2 DIABETES MELLITUS (H): ICD-10-CM

## 2023-01-17 DIAGNOSIS — R32 URINARY INCONTINENCE, UNSPECIFIED TYPE: ICD-10-CM

## 2023-01-17 LAB
ANION GAP SERPL CALCULATED.3IONS-SCNC: 12 MMOL/L (ref 7–15)
BASOPHILS # BLD AUTO: 0.1 10E3/UL (ref 0–0.2)
BASOPHILS NFR BLD AUTO: 1 %
BUN SERPL-MCNC: 32.7 MG/DL (ref 8–23)
CALCIUM SERPL-MCNC: 10.3 MG/DL (ref 8.6–10)
CHLORIDE SERPL-SCNC: 89 MMOL/L (ref 98–107)
CREAT SERPL-MCNC: 1.28 MG/DL (ref 0.51–0.95)
DEPRECATED HCO3 PLAS-SCNC: 31 MMOL/L (ref 22–29)
EOSINOPHIL # BLD AUTO: 0.1 10E3/UL (ref 0–0.7)
EOSINOPHIL NFR BLD AUTO: 1 %
ERYTHROCYTE [DISTWIDTH] IN BLOOD BY AUTOMATED COUNT: 12.6 % (ref 10–15)
EST. AVERAGE GLUCOSE BLD GHB EST-MCNC: 387 MG/DL
GFR SERPL CREATININE-BSD FRML MDRD: 48 ML/MIN/1.73M2
GLUCOSE SERPL-MCNC: 643 MG/DL (ref 70–99)
HBA1C MFR BLD: 15.1 %
HCT VFR BLD AUTO: 50.1 % (ref 35–47)
HGB BLD-MCNC: 17.1 G/DL (ref 11.7–15.7)
IMM GRANULOCYTES # BLD: 0 10E3/UL
IMM GRANULOCYTES NFR BLD: 0 %
LYMPHOCYTES # BLD AUTO: 2.9 10E3/UL (ref 0.8–5.3)
LYMPHOCYTES NFR BLD AUTO: 29 %
MCH RBC QN AUTO: 30.1 PG (ref 26.5–33)
MCHC RBC AUTO-ENTMCNC: 34.1 G/DL (ref 31.5–36.5)
MCV RBC AUTO: 88 FL (ref 78–100)
MONOCYTES # BLD AUTO: 0.7 10E3/UL (ref 0–1.3)
MONOCYTES NFR BLD AUTO: 7 %
NEUTROPHILS # BLD AUTO: 6.3 10E3/UL (ref 1.6–8.3)
NEUTROPHILS NFR BLD AUTO: 62 %
NRBC # BLD AUTO: 0 10E3/UL
NRBC BLD AUTO-RTO: 0 /100
PLATELET # BLD AUTO: 263 10E3/UL (ref 150–450)
POTASSIUM SERPL-SCNC: 5.2 MMOL/L (ref 3.4–5.3)
RBC # BLD AUTO: 5.68 10E6/UL (ref 3.8–5.2)
SODIUM SERPL-SCNC: 132 MMOL/L (ref 136–145)
WBC # BLD AUTO: 10.1 10E3/UL (ref 4–11)

## 2023-01-17 PROCEDURE — G0463 HOSPITAL OUTPT CLINIC VISIT: HCPCS

## 2023-01-17 PROCEDURE — 82306 VITAMIN D 25 HYDROXY: CPT | Mod: ZL | Performed by: NURSE PRACTITIONER

## 2023-01-17 PROCEDURE — 85004 AUTOMATED DIFF WBC COUNT: CPT | Mod: ZL | Performed by: NURSE PRACTITIONER

## 2023-01-17 PROCEDURE — 82668 ASSAY OF ERYTHROPOIETIN: CPT | Mod: ZL | Performed by: NURSE PRACTITIONER

## 2023-01-17 PROCEDURE — 83036 HEMOGLOBIN GLYCOSYLATED A1C: CPT | Mod: ZL | Performed by: NURSE PRACTITIONER

## 2023-01-17 PROCEDURE — 83970 ASSAY OF PARATHORMONE: CPT | Mod: ZL | Performed by: NURSE PRACTITIONER

## 2023-01-17 PROCEDURE — 99214 OFFICE O/P EST MOD 30 MIN: CPT | Performed by: NURSE PRACTITIONER

## 2023-01-17 PROCEDURE — 36415 COLL VENOUS BLD VENIPUNCTURE: CPT | Mod: ZL | Performed by: NURSE PRACTITIONER

## 2023-01-17 PROCEDURE — 80048 BASIC METABOLIC PNL TOTAL CA: CPT | Mod: ZL | Performed by: NURSE PRACTITIONER

## 2023-01-17 ASSESSMENT — PAIN SCALES - GENERAL: PAINLEVEL: NO PAIN (0)

## 2023-01-17 NOTE — PROGRESS NOTES
Assessment & Plan     Type 2 diabetes, HbA1c goal < 7% (H)  Last A1c done on 10/17/22 and it was 11.4. Glucose today is critically high at 643. A urine had been ordered, but patient did not complete this. Often non-complaint with medications. Stressed the importance of taking her medications. A1c today is pending. Follows with diabetes education. Reports using insulin pump and taking jardiance. Has omnipod.    Will go to the ER with new or worsening symptoms.     - Hemoglobin A1c  - Basic metabolic panel  - Albumin Random Urine Quantitative with Creat Ratio    Hyperlipidemia, unspecified hyperlipidemia type  Tolerating statin. Lipids well controlled in 7/15/2022. AST and ALT were normal at the time.     Essential hypertension  Well controlled. Continue medications. Encouraged daily exercise and low sodium diet. Recommended checking BP's 2x/wk, call the clinic if consistantly s>140 or d>90. Follow up in 3 months    Urinary incontinence, unspecified type  Urinary incontinence over the past week. A UA was ordered, but patient forgot to do this. Will see if she wants to come in for lab only. If not infection is seen, will send for pelvic floor therapy.   - UA with Microscopic reflex to Culture - [place]    Elevated hemoglobin (H)  Labs today: hgb 17.1, RBC 5.68, and hematocrit 50.1. Likely from COPD. Declines hematology referral.  - CBC with platelets and differential    Diabetic polyneuropathy associated with diabetes mellitus due to underlying condition (H)  Already using diabetic shoes.     Tobacco abuse  Encouraged cessation.    Microalbuminuria due to type 2 diabetes mellitus (H)  Tolerating ACE. Continue.    Pulmonary emphysema, unspecified emphysema type (H)  Stable. Continue inhalers. Encouraged smoking cessation.    Hypercalcemia  Calcium is elevated today 10.3. Will have nursing staff call to see if she is taking a supplement. If not, will add on PTH and Vit D.      FUTURE APPOINTMENT:  Return in about 3  months (around 4/17/2023).    JEMIMA Kent     I was present with the nurse practitioner student who participated in the service and in the documentation of the note. I have verified the history and personally performed the physical exam and medical decision making. I agree with the assessment and plan of care as documented in the note.     Amberly Whyte NP  [facility] [place] Luke is a 57 year old, presenting for the following health issues:  Diabetes, Hypertension, and Lipids      HPI     Diabetes Follow-up    How often are you checking your blood sugar? One time daily  What time of day are you checking your blood sugars (select all that apply)?  No specific time  Have you had any blood sugars above 200?  No  Have you had any blood sugars below 70?  No    What symptoms do you notice when your blood sugar is low?  Not applicable    What concerns do you have today about your diabetes? None     Do you have any of these symptoms? (Select all that apply)  No numbness or tingling in feet.  No redness, sores or blisters on feet.  No complaints of excessive thirst.  No reports of blurry vision.  No significant changes to weight.    Follows with the diabetic center. Last visit with them on 1/5/23.     Taking Toujeo (insulin pump) and Jardiance without side effects. Metformin caused an upset stomach. Is often noncomplaint with her medications.     On asa.     She denies chest pain, dizziness, syncope, or palpitations. Some chronic shortness of breath related to her COPD. Feels this has continued to improved. Denies any recent shortness of breath.     Working on weight loss. Walking outside, but less than winter. Walks the stairs at home for exercise, usually about 2x/wk.     On ACE.     She does have chronic kidney disease. Typically avoids NSAIDs.     No abdominal pain. No nausea or vomiting.     Has upcoming [facility] app[facility] next month.    Hyperlipidemia  "Follow-Up      Are you regularly taking any medication or supplement to lower your cholesterol?   Yes- lipitor    Are you having muscle aches or other side effects that you think could be caused by your cholesterol lowering medication?  No     She denies chest pain, dizziness, syncope, or palpitations. Some chronic shortness of breath related to her COPD. Feels this has improved. Denies any recent shortness of breath.     Hypertension Follow-up      Do you check your blood pressure regularly outside of the clinic? Yes    Are you following a low salt diet? No    Are your blood pressures ever more than 140 on the top number (systolic) OR more   than 90 on the bottom number (diastolic), for example 140/90? Yes   Taking lisinopril 40 mg with amlodipine 5 mg. No side effects.     Urinary incontinence over past week. Leaking small amounts of urine throughout the day and night. Denies dysuria, hematuria, abdominal pain, nausea, vomiting, back pain, or history of UTIs.    BP Readings from Last 2 Encounters:   01/17/23 118/70   01/05/23 126/76     Hemoglobin A1C (%)   Date Value   10/17/2022 11.4 (H)   07/15/2022 10.4 (H)   05/10/2021 >14.0 (H)   02/10/2021 10.7 (H)     LDL Cholesterol Calculated (mg/dL)   Date Value   07/15/2022 66   04/15/2022 83   11/09/2020 74   02/04/2020 49       Review of Systems   Constitutional, HEENT, cardiovascular, pulmonary, gi and gu systems are negative, except as otherwise noted.      Objective    /70 (BP Location: Left arm, Patient Position: Sitting, Cuff Size: Adult Large)   Pulse 82   Temp 97.6  F (36.4  C) (Tympanic)   Ht 1.626 m (5' 4\")   Wt 89.5 kg (197 lb 4.8 oz)   SpO2 92%   BMI 33.87 kg/m    Body mass index is 33.87 kg/m .  Physical Exam   GENERAL: healthy, alert and no distress, obese  NECK: no adenopathy, no asymmetry, masses, or scars and thyroid normal to palpation  RESP: lungs clear to auscultation - no rales, rhonchi or wheezes  CV: regular rate and rhythm, no " murmur, click or rub, no peripheral edema and peripheral pulses strong  ABDOMEN: soft, nontender, no hepatosplenomegaly, no masses and bowel sounds normal  PSYCH: mentation appears normal, affect normal/bright  Diabetic foot exam: no trophic changes or ulcerative lesions, DP reduced bilaterally and reduced sensation for entire monofilament exam    Results for orders placed or performed in visit on 01/17/23   Basic metabolic panel     Status: Abnormal   Result Value Ref Range    Sodium 132 (L) 136 - 145 mmol/L    Potassium 5.2 3.4 - 5.3 mmol/L    Chloride 89 (L) 98 - 107 mmol/L    Carbon Dioxide (CO2) 31 (H) 22 - 29 mmol/L    Anion Gap 12 7 - 15 mmol/L    Urea Nitrogen 32.7 (H) 8.0 - 23.0 mg/dL    Creatinine 1.28 (H) 0.51 - 0.95 mg/dL    Calcium 10.3 (H) 8.6 - 10.0 mg/dL    Glucose 643 (HH) 70 - 99 mg/dL    GFR Estimate 48 (L) >60 mL/min/1.73m2   CBC with platelets and differential     Status: Abnormal   Result Value Ref Range    WBC Count 10.1 4.0 - 11.0 10e3/uL    RBC Count 5.68 (H) 3.80 - 5.20 10e6/uL    Hemoglobin 17.1 (H) 11.7 - 15.7 g/dL    Hematocrit 50.1 (H) 35.0 - 47.0 %    MCV 88 78 - 100 fL    MCH 30.1 26.5 - 33.0 pg    MCHC 34.1 31.5 - 36.5 g/dL    RDW 12.6 10.0 - 15.0 %    Platelet Count 263 150 - 450 10e3/uL    % Neutrophils 62 %    % Lymphocytes 29 %    % Monocytes 7 %    % Eosinophils 1 %    % Basophils 1 %    % Immature Granulocytes 0 %    NRBCs per 100 WBC 0 <1 /100    Absolute Neutrophils 6.3 1.6 - 8.3 10e3/uL    Absolute Lymphocytes 2.9 0.8 - 5.3 10e3/uL    Absolute Monocytes 0.7 0.0 - 1.3 10e3/uL    Absolute Eosinophils 0.1 0.0 - 0.7 10e3/uL    Absolute Basophils 0.1 0.0 - 0.2 10e3/uL    Absolute Immature Granulocytes 0.0 <=0.4 10e3/uL    Absolute NRBCs 0.0 10e3/uL   CBC with platelets and differential     Status: Abnormal    Narrative    The following orders were created for panel order CBC with platelets and differential.  Procedure                               Abnormality         Status                      ---------                               -----------         ------                     CBC with platelets and d...[[id number]]  Abnormal            Final result                 Please view results for these tests on the individual orders.

## 2023-01-17 NOTE — TELEPHONE ENCOUNTER
Glucose was critically high at 643. I did talk with patient who agreed to take her insulin as prescribed. She was unable to void here, but was given a cup and will bring a sample of her urine to the clinic.     Feels fine. Agrees to go to the ER with any new or worsening symptoms.     Calcium was high. Not taking a supplement. Will add on PTH and Vit D. Will notify patient of the results when available and intervene accordingly.

## 2023-01-17 NOTE — LETTER
January 27, 2023      Marly Cannon  [address]  [place] [address]        Dear ,    We are writing to inform you of your test results.    Test results indicate you may require additional follow up, see comment below.    Glucose high. No ketones. Needs to take control of her diabetes.  DM poorly controlled.   On lisinopril. Will continue         Resulted Orders   Hemoglobin A1c   Result Value Ref Range    Estimated Average Glucose 387 mg/dL    Hemoglobin A1C 15.1 (H) <5.7 %   Basic metabolic panel   Result Value Ref Range    Sodium 132 (L) 136 - 145 mmol/L    Potassium 5.2 3.4 - 5.3 mmol/L    Chloride 89 (L) 98 - 107 mmol/L    Carbon Dioxide (CO2) 31 (H) 22 - 29 mmol/L    Anion Gap 12 7 - 15 mmol/L    Urea Nitrogen 32.7 (H) 8.0 - 23.0 mg/dL    Creatinine 1.28 (H) 0.51 - 0.95 mg/dL    Calcium 10.3 (H) 8.6 - 10.0 mg/dL    Glucose 643 (HH) 70 - 99 mg/dL    GFR Estimate 48 (L) >60 mL/min/1.73m2      Comment:      Effective December 21, 2021 eGFRcr in adults is calculated using the 2021 CKD-EPI creatinine equation which includes age and gender (Rosalie et al., NEJ, DOI: 10.1056/FBKTdu9349958)   UA with Microscopic reflex to Culture - [place]   Result Value Ref Range    Color Urine Straw Colorless, Straw, Light Yellow, Yellow    Appearance Urine Clear Clear    Glucose Urine >1000 (A) Negative mg/dL    Bilirubin Urine Negative Negative    Ketones Urine Negative Negative mg/dL    Specific Gravity Urine 1.030 1.003 - 1.035    Blood Urine Negative Negative    pH Urine 5.0 4.7 - 8.0    Protein Albumin Urine Negative Negative mg/dL    Urobilinogen Urine Normal Normal, 2.0 mg/dL    Nitrite Urine Negative Negative    Leukocyte Esterase Urine Negative Negative    Mucus Urine Present (A) None Seen /LPF    RBC Urine 0 <=2 /HPF    WBC Urine <1 <=5 /HPF    Squamous Epithelials Urine 0 <=1 /HPF    Narrative    Urine Culture not indicated   Albumin Random Urine Quantitative with Creat Ratio   Result Value Ref Range     Creatinine Urine mg/dL 24.6 mg/dL      Comment:      The reference ranges have not been established in urine creatinine. The results should be integrated into the clinical context for interpretation.    Albumin Urine mg/L 42.6 mg/L      Comment:      The reference ranges have not been established in urine albumin. The results should be integrated into the clinical context for interpretation.    Albumin Urine mg/g Cr 173.17 (H) 0.00 - 25.00 mg/g Cr      Comment:      Microalbuminuria is defined as an albumin:creatinine ratio of 17 to 299 for males and 25 to 299 for females. A ratio of albumin:creatinine of 300 or higher is indicative of overt proteinuria.  Due to biologic variability, positive results should be confirmed by a second, first-morning random or 24-hour timed urine specimen. If there is discrepancy, a third specimen is recommended. When 2 out of 3 results are in the microalbuminuria range, this is evidence for incipient nephropathy and warrants increased efforts at glucose control, blood pressure control, and institution of therapy with an angiotensin-converting-enzyme (ACE) inhibitor (if the patient can tolerate it).     CBC with platelets and differential   Result Value Ref Range    WBC Count 10.1 4.0 - 11.0 10e3/uL    RBC Count 5.68 (H) 3.80 - 5.20 10e6/uL    Hemoglobin 17.1 (H) 11.7 - 15.7 g/dL    Hematocrit 50.1 (H) 35.0 - 47.0 %    MCV 88 78 - 100 fL    MCH 30.1 26.5 - 33.0 pg    MCHC 34.1 31.5 - 36.5 g/dL    RDW 12.6 10.0 - 15.0 %    Platelet Count 263 150 - 450 10e3/uL    % Neutrophils 62 %    % Lymphocytes 29 %    % Monocytes 7 %    % Eosinophils 1 %    % Basophils 1 %    % Immature Granulocytes 0 %    NRBCs per 100 WBC 0 <1 /100    Absolute Neutrophils 6.3 1.6 - 8.3 10e3/uL    Absolute Lymphocytes 2.9 0.8 - 5.3 10e3/uL    Absolute Monocytes 0.7 0.0 - 1.3 10e3/uL    Absolute Eosinophils 0.1 0.0 - 0.7 10e3/uL    Absolute Basophils 0.1 0.0 - 0.2 10e3/uL    Absolute Immature Granulocytes 0.0 <=0.4  10e3/uL    Absolute NRBCs 0.0 10e3/uL   Parathyroid Hormone Intact   Result Value Ref Range    Parathyroid Hormone Intact 53 15 - 65 pg/mL    Narrative    This result was obtained with the Roche Elecsys PTH STAT assay.   This reference range differs from PTH assays used in other [facility] laboratories.   Erythropoietin   Result Value Ref Range    Erythropoietin 7 4 - 27 mU/mL      Comment:      INTERPRETIVE INFORMATION: Erythropoietin  Normal serum concentrations of erythropoietin for 95% of   individuals with normal hematocrits range from 4-27 mU/mL.    As the hematocrit is lowered by iron deficiency, aplastic,   or hemolytic anemia, the concentration of erythropoietin   increases as shown in the graph below. In the absence of   anemia, elevated concentrations are seen in renal tumors,   as a manifestation of renal transplant rejection, and in   secondary polycythemia. Low values may be observed in   hemochromatosis.          Expected Erythropoietin Concentrations in Patients                     with Uncomplicated Anemia       Erythropoietin (mU/mL)                100,000 - +                          +                 10,000 - +.......                          + .......                  1,000 - +    .......                          +     ........                    100 - +       ........                          +        ........                     10 - +           ........                          +---+---+---+---+---+---+                         10   20  30  40  50  60  70                                (Hematocrit %)              (Contributions To Nephrology 1988:66:54-62)    Decreased erythropoietin concentrations with an elevated   hematocrit are observed in patients with polycythemia rubra   vera, and with a decreased hematocrit in patients with HIV   infection who are receiving AZT.  Patients on AZT who have   anemia and erythropoietin concentrations of less than or   equal to 500 mU/mL may benefit  from therapy with   recombinant EPO ([facility] [id number],1990).  Performed By: [facility]  [address] [place] [address]  : Wayne Torres MD, PhD   Vitamin D Deficiency   Result Value Ref Range    Vitamin D, Total (25-Hydroxy) 46 20 - 75 ug/L    Narrative    Season, race, dietary intake, and treatment affect the concentration of 25-hydroxy-Vitamin D. Values may decrease during winter months and increase during summer months. Values 20-29 ug/L may indicate Vitamin D insufficiency and values <20 ug/L may indicate Vitamin D deficiency.    Vitamin D determination is routinely performed by an immunoassay specific for 25 hydroxyvitamin D3.  If an individual is on vitamin D2(ergocalciferol) supplementation, please specify 25 OH vitamin D2 and D3 level determination by LCMSMS test [id number].         If you have any questions or concerns, please call the clinic at the number listed above.       Sincerely,      Amberly Whyte NP

## 2023-01-17 NOTE — TELEPHONE ENCOUNTER
----- Message from Amberly Whyte NP sent at 1/17/2023  4:37 PM CST -----  A UA was ordered on this patient, but she did not do this. Does she want to come in for lab only?

## 2023-01-18 LAB
DEPRECATED CALCIDIOL+CALCIFEROL SERPL-MC: 46 UG/L (ref 20–75)
PTH-INTACT SERPL-MCNC: 53 PG/ML (ref 15–65)

## 2023-01-18 NOTE — TELEPHONE ENCOUNTER
Pt called back and is wondering if she needs to stop calcium.She is taking calicum.Updated her she needs to stop taking this per yesterday lab note from provider.    She verbalized understanding. Advised if s/s new or worse go to ED.She verbalized understanding.    Sruthi Casanova RN

## 2023-01-19 LAB — EPO SERPL-ACNC: 7 MU/ML

## 2023-01-20 ENCOUNTER — TELEPHONE (OUTPATIENT)
Dept: FAMILY MEDICINE | Facility: OTHER | Age: 58
End: 2023-01-20

## 2023-01-25 DIAGNOSIS — Z53.9 PERSONS ENCOUNTERING HEALTH SERVICES FOR SPECIFIC PROCEDURES, NOT CARRIED OUT: Primary | ICD-10-CM

## 2023-01-27 ENCOUNTER — LAB (OUTPATIENT)
Dept: LAB | Facility: OTHER | Age: 58
End: 2023-01-27
Payer: COMMERCIAL

## 2023-01-27 ENCOUNTER — TELEPHONE (OUTPATIENT)
Dept: FAMILY MEDICINE | Facility: OTHER | Age: 58
End: 2023-01-27

## 2023-01-27 ENCOUNTER — MEDICAL CORRESPONDENCE (OUTPATIENT)
Dept: HEALTH INFORMATION MANAGEMENT | Facility: CLINIC | Age: 58
End: 2023-01-27

## 2023-01-27 DIAGNOSIS — D77 OTHER DISORDERS OF BLOOD AND BLOOD-FORMING ORGANS IN DISEASES CLASSIFIED ELSEWHERE: ICD-10-CM

## 2023-01-27 DIAGNOSIS — D58.2 ELEVATED HEMOGLOBIN (H): ICD-10-CM

## 2023-01-27 LAB
ALBUMIN UR-MCNC: NEGATIVE MG/DL
APPEARANCE UR: CLEAR
BILIRUB UR QL STRIP: NEGATIVE
COLOR UR AUTO: ABNORMAL
CREAT UR-MCNC: 24.6 MG/DL
GLUCOSE UR STRIP-MCNC: >1000 MG/DL
HGB UR QL STRIP: NEGATIVE
INTERPRETATION: NORMAL
KETONES UR STRIP-MCNC: NEGATIVE MG/DL
LEUKOCYTE ESTERASE UR QL STRIP: NEGATIVE
MICROALBUMIN UR-MCNC: 42.6 MG/L
MICROALBUMIN/CREAT UR: 173.17 MG/G CR (ref 0–25)
MUCOUS THREADS #/AREA URNS LPF: PRESENT /LPF
NITRATE UR QL: NEGATIVE
PH UR STRIP: 5 [PH] (ref 4.7–8)
RBC URINE: 0 /HPF
SIGNIFICANT RESULTS: NORMAL
SP GR UR STRIP: 1.03 (ref 1–1.03)
SPECIMEN DESCRIPTION: NORMAL
SQUAMOUS EPITHELIAL: 0 /HPF
TEST DETAILS, MDL: NORMAL
UROBILINOGEN UR STRIP-MCNC: NORMAL MG/DL
WBC URINE: <1 /HPF

## 2023-01-27 PROCEDURE — G0452 MOLECULAR PATHOLOGY INTERPR: HCPCS | Mod: 26 | Performed by: STUDENT IN AN ORGANIZED HEALTH CARE EDUCATION/TRAINING PROGRAM

## 2023-01-27 PROCEDURE — 82570 ASSAY OF URINE CREATININE: CPT | Mod: ZL | Performed by: NURSE PRACTITIONER

## 2023-01-27 PROCEDURE — 81403 MOPATH PROCEDURE LEVEL 4: CPT | Mod: ZL

## 2023-01-27 PROCEDURE — 81001 URINALYSIS AUTO W/SCOPE: CPT | Mod: ZL | Performed by: NURSE PRACTITIONER

## 2023-01-27 PROCEDURE — 36415 COLL VENOUS BLD VENIPUNCTURE: CPT | Mod: ZL

## 2023-01-27 NOTE — TELEPHONE ENCOUNTER
Pt updated on PCP result note from labs from today 1.27.2023:  Glucose high. No ketones. Needs to take control of her diabetes. DM poorly controlled.      On lisinopril.Will continue.    She verbalized understanding.    Sruthi Casanova RN

## 2023-01-31 DIAGNOSIS — E11.9 TYPE 2 DIABETES, HBA1C GOAL < 7% (H): ICD-10-CM

## 2023-02-01 RX ORDER — EMPAGLIFLOZIN 25 MG/1
TABLET, FILM COATED ORAL
Qty: 30 TABLET | Refills: 1 | Status: SHIPPED | OUTPATIENT
Start: 2023-02-01 | End: 2023-03-29

## 2023-02-07 LAB
INTERPRETATION: NORMAL
SIGNIFICANT RESULTS: NORMAL
SPECIMEN DESCRIPTION: NORMAL
TEST DETAILS, MDL: NORMAL

## 2023-02-08 ENCOUNTER — TELEPHONE (OUTPATIENT)
Dept: EDUCATION SERVICES | Facility: OTHER | Age: 58
End: 2023-02-08

## 2023-02-08 ENCOUNTER — TELEPHONE (OUTPATIENT)
Dept: FAMILY MEDICINE | Facility: OTHER | Age: 58
End: 2023-02-08

## 2023-02-08 DIAGNOSIS — R06.83 SNORES: Primary | ICD-10-CM

## 2023-02-08 DIAGNOSIS — D58.2 ELEVATED HEMOGLOBIN (H): ICD-10-CM

## 2023-02-08 NOTE — TELEPHONE ENCOUNTER
Amberly Whyte NP   2/8/2023  6:52 AM CST       Normal results, please notify patient. Elevated HGB most likely from COPD or a sleep apnea. Does she want to try a sleep study? If so, please pend.   Amberly Whyte NP     Patient agreeable to sleep study. Referral pended.

## 2023-02-08 NOTE — TELEPHONE ENCOUNTER
Patient is calling back to get rescheduled with Courtney Chaudhary due to her appointment being canceled today. Once appointments were pulled she is already rescheduled for 3/8/23.

## 2023-02-23 ENCOUNTER — TELEPHONE (OUTPATIENT)
Dept: FAMILY MEDICINE | Facility: OTHER | Age: 58
End: 2023-02-23

## 2023-02-23 DIAGNOSIS — Z79.4 TYPE 2 DIABETES MELLITUS WITH HYPERGLYCEMIA, WITH LONG-TERM CURRENT USE OF INSULIN (H): Primary | ICD-10-CM

## 2023-02-23 DIAGNOSIS — E11.65 TYPE 2 DIABETES MELLITUS WITH HYPERGLYCEMIA, WITH LONG-TERM CURRENT USE OF INSULIN (H): Primary | ICD-10-CM

## 2023-02-26 ENCOUNTER — DOCUMENTATION ONLY (OUTPATIENT)
Dept: SLEEP MEDICINE | Facility: HOSPITAL | Age: 58
End: 2023-02-26

## 2023-02-27 NOTE — PROGRESS NOTES
Chart review prior to sleep testing.    Patient Summary:  57 year old yo female who is referred for chronic insomnia, history of severe ADEEL.    Patient Active Problem List    Diagnosis Date Noted     Diabetic polyneuropathy associated with diabetes mellitus due to underlying condition (H) 01/17/2023     Priority: Medium     Urinary incontinence, unspecified type 01/17/2023     Priority: Medium     ADEEL (obstructive sleep apnea) 02/16/2021     Priority: Medium     Interp for PSG dated 12/16/2020.     Weight 202 lbs.  Total sleep time 417.5 minutes, sleep efficiency 83%, sleep latency 15 minutes, REM latency - minutes.  Arousal index 45.6.  Sleep architecture was incredibly fragmented with no clear REM observed.     AHI 43.1, events appearing primarily obstructive in nature.  Mean Spo2 86%, isabel SpO2 78%, 96.2% of recording was <= 89%.       EKG appeared NSR.     No PLM's observed.      Unable to assess for phasic or tonic EMG activity in REM.  No abnormal nocturnal behaviors observed.     Assessment:  - Severe ADEEL with severe sleep-associated hypoxemia and sustained hypoxemia, with increased concern for hypoventilation.  - Potential that severity under-reported given highly fragmented sleep architecture and no clear REM observed.       Memory disturbance 02/13/2020     Priority: Medium     Callus of foot 05/29/2019     Priority: Medium     Hyperlipidemia, unspecified hyperlipidemia type 05/28/2019     Priority: Medium     Essential hypertension 05/28/2019     Priority: Medium     H/O colonoscopy 04/24/2019     Priority: Medium     Had in 10/2018, due for repeat 5 years       [place] cardiac risk <10% in next 10 years 02/22/2019     Priority: Medium     Overview:   Started high intensity statin.        Tubular adenoma of colon 09/28/2018     Priority: Medium     Intellectual disability 08/01/2017     Priority: Medium     ACP (advance care planning) 09/09/2016     Priority: Medium     Advance Care Planning 9/9/2016:  ACP Review of Chart / Resources Provided:  Reviewed chart for advance care plan.  Marly Cannon has been provided information and resources to begin or update their advance care plan.  Added by Naty Allen             Hyperparathyroidism due to renal insufficiency (H) 06/14/2016     Priority: Medium     Vitamin D deficiency 06/14/2016     Priority: Medium     Microalbuminuria due to type 2 diabetes mellitus (H) 06/14/2016     Priority: Medium     Stage 3 chronic kidney disease (H) 02/12/2016     Priority: Medium     Overview:   Updated per 10/1/17 IMO import       Pulmonary emphysema (H) 08/21/2015     Priority: Medium     Confirmed via PFTs in 8/2015       Type 2 diabetes, HbA1c goal < 7% (H) 07/24/2015     Priority: Medium     Tobacco abuse 07/09/2015     Priority: Medium     Tremor 11/09/2009     Priority: Medium     Formatting of this note might be different from the original.  Shakes head       Breast fibrocystic disorder 02/22/2008     Priority: Medium     Overview:   IMO Update 10/11         Current Outpatient Medications   Medication     Acetaminophen (TYLENOL PO)     amLODIPine (NORVASC) 5 MG tablet     ammonium lactate (AMLACTIN) 12 % external cream     aspirin (ASPIRIN LOW DOSE) 81 MG chewable tablet     atorvastatin (LIPITOR) 40 MG tablet     budesonide-formoterol (SYMBICORT) 160-4.5 MCG/ACT Inhaler     Cholecalciferol (VITAMIN D3) 50 MCG (2000 UT) CAPS     COMBIVENT RESPIMAT  MCG/ACT inhaler     Continuous Blood Gluc Sensor (FREESTYLE HANK 2 SENSOR) MISC     docusate sodium (COLACE) 100 MG capsule     hydrocortisone (CORTAID) 1 % external cream     hydrocortisone 2.5 % cream     insulin aspart (NOVOLOG VIAL) 100 UNITS/ML vial     Insulin Disposable Pump (OMNIPOD DASH PODS, GEN 4,) MISC     insulin glargine U-300 (TOUJEO SOLOSTAR) 300 UNIT/ML (1 units dial) pen     insulin pen needle (32G X 4 MM) 32G X 4 MM miscellaneous     INSULIN PUMP - OUTPATIENT     JARDIANCE 25 MG TABS tablet      lisinopril (ZESTRIL) 40 MG tablet     nystatin (MYCOSTATIN) 828270 UNIT/GM external powder     OneTouch Delica Lancets 33G MISC     ONETOUCH ULTRA test strip     order for DME     primidone (MYSOLINE) 50 MG tablet     urea (GORDONS UREA) 40 % external ointment     No current facility-administered medications for this visit.       Pertinent PMHx of morbid obesity, HTN, elevated Hgb, DM II, COPD.    She is a known patient in our clinic, last visit on 2/16/2021.  Overall, would recommend follow-up visit, had previously returned bilevel PAP due to non-compliance.    Overall, recommend follow-up visit.    Assessment:  - Severe ADEEL with severe sleep-associated hypoxemia and sustained hypoxemia, with increased concern for hypoventilation.  - Potential that severity under-reported given highly fragmented sleep architecture and no clear REM observed.  -Minimal usage with reported difficulty exhaling and inhaling on current settings of bilevel PAP auto titrate with minimum EPAP 5, maximum IPAP 20, pressure support 7 on spontaneous mode on room air.     Plan:  -To help aid compliance and to allow patient to at least retry treatment, we agreed to decrease pressures.  Plan to continue in bilevel auto titrate and spontaneous mode on room air, will continue minimum EPAP at 5 but will reduce maximum IPAP to 15 and pressure support to 4.  ---  This note was written with the assistance of the Dragon voice-dictation technology software. The final document, although reviewed, may contain errors. For corrections, please contact the office.    Anupam Duval MD    Sleep Medicine    [facility]  - [place], [place]  o Main Office: [phone]    [facility] and [facility] [place], [place]  o [address], [address]  o Schedule visits: [phone]  o Main Office: [phone]  o Fax: [phone]

## 2023-02-27 NOTE — PROGRESS NOTES
SLEEP HISTORY QUESTIONNAIRE    Please describe the main reason for your sleep appointment? Having trouble sleeping at night    How long has this been a problem? ?    Have you been diagnosed with a sleep problem in the past? NO    If so, what? n/a    What treatment was recommended? n/a    Have you had a sleep study in the past? NO    If yes, where and when? n/a    Sleep Habits:   Do you read in bed? No  Do you eat in bed? No  Do you watch TV in bed? Yes  Do you work in bed? No  Do you use a phone or computer in bed? No    Is you sleep disturbed by:   Bed partner: No  Children: No  Noise: No   Pets: No  Other: no      On two or more nights per week, do you drink alcohol to help you fall asleep?NO    On two or more nights per week, do you take melatonin to help you fall asleep? NO    On two or more nights per week, do you take over the counter medicine to fall asleep?  NO    Do you take drinks with caffeine (coffee, tea, soda, energy drinks)? YES    Do you have 3 or more caffeine drinks in a day? NO    Do you have caffeine drinks within 6 hours of bedtime? NO    Do you smoke or use tobacco? YES    Do you exercise? NO    Sleep Routine:   Using a 24 Hour Clock    What time do you usually get into bed on workdays? ?    Weekend/non work days? ?    What time do you get out of bed on workdays? ?      Weekend/non work days??    Do you work the evening or night shift or do your shifts rotate? NO    How long does it usually take to fall to sleep? 2-3    How many times do you wake during the night? All the time    How much time do you feel that you are awake during the entire night? 2 min - hours    How long does it take for you to fall back to sleep after you wake up? 3 min    Why do you think you wake up? bathroom    What do you do when you wake up? Drink water    How much sleep do you think you get on work nights? none    How much sleep do you think you get on weekends/non work days? ?    How much sleep do you think you need  to feel your best? ?    How many days during a week do you take a nap on average? everyday    What is the average length of your naps? 1 hour    Do you feel better after taking a nap? YES    If you could chose the best sleep schedule for you, what time would you go to bed? 9pm  What time would you get up? 8am    Do you read in bed? NO    Do you eat in bed? NO    Do you watch TV in bed? YES    Do you do work in bed? NO    Do you use a computer or phone in bed? NO    Sleep Disruptions?   Leg movements:  Do you ever have restless, crawling, aching or other unusual feelings in your legs? YES    Do you ever wake yourself by kicking your legs during the night? NO    Are the sheets and blankets messed up or tossed about when you get up? YES    Night-time behaviors:   Do you have nightmares or night terrors? NO   How often? n/a    Have you had times when you were sleep walking? NO    Have you been seen doing anything unusual while you sleep at nights? NO  What? n/a  How often? n/a    Have you ever hurt yourself or someone else while you were sleeping? NO  Please describe: n/a    Do you clench or grind your teeth during the night? no    Sleep Apnea (pauses in breathing during sleep):  Do you wake with a headache in the morning? NO  How often? n/a    Does your bed partner, family or friends ever say that you snore? NO  How many nights per week do you snore? n/a  Can snoring be heard outside the bedroom? n/a    Do you ever wake yourself up from snoring, gasping or choking? NO    Have you ever been told that you stop breathing or have pauses in your breathing? NO    Do you wake in the morning with a dry throat or mouth? YES    Do you have trouble breathing through your nose? NO    Do you have problems with heartburn, reflux or a hiatal hernia? YES    Which positions do you usually sleep in? (stomach, back, sides, all) all    Do you use oxygen or any other medical equipment when you sleep? NO    Do members of your family  (related by blood) snore? NO    Have any members of your family been diagnosed with with sleep apnea? NO    Do other members of your family have restless leg? NO    Do other members of your family have sleep walking? NO    Have you ever had an accident, or near accident due to sleepiness while driving? NO    Does your sleepiness affect your work on the job or at school? NO    Do you ever fall asleep by accident while doing a task? NO    Have you had sudden muscle weakness when laughing, angry or surprised? NO    Have you ever been unable to move your body when falling asleep or waking up? NO    Do you ever have trouble  your dreams from real life events? NO  Please describe: n/a    Physical Health: (including illness and injury): During the past 30 days, on how many days was your physical health not good? ?/30 days     Mental Health: (including stress, depression, and problems with emotions): During the last 30 days, how may days was your mental health not good? ?/30 days.     During the past 30 days, on how many days did poor physical or mental health keep you from doing your usual activities? This might be self-care, work, or play? ?/30 days.     Social History:   Marital status: single    Who lives in your home with you? boyfriend    Mother (alive or dead)? ? If has , from what? ?  Father (alive or dead)? ? If has , from what? ?    Siblings: ?  Have any ? ?  If so, from what? ?    Currently working? NO  If yes, work: n/a  Former jobs: ?     Sleepiness Scale:   Sitting and reading 0   Watching TV 3   Sitting in a public place 0   Riding in a car 2   Lying down to rest in the afternoon 2   Sitting and talking to someone 0   Sitting quietly after a lunch without alcohol 0   In a car, stopping for a few minutes in traffic 0       Surgical History:   Past Surgical History:   Procedure Laterality Date     BIOPSY BREAST Left 2008    patient states no bx, Clip in left breast/ stereo bxc  2-- no path in [facility] that far back     COLONOSCOPY N/A 08/20/2015    Procedure: COLONOSCOPY;  Surgeon: Gentry Porter MD;  Location: [facility]     COLONOSCOPY N/A 09/21/2018    Procedure: COLONOSCOPY;  COLONOSCOPY WITH POLYPECTOMY;  Surgeon: Gentry Porter MD;  Location: [facility]       Medical Conditions: No past medical history on file.    Medications:   Current Outpatient Medications   Medication Sig     Acetaminophen (TYLENOL PO) Take 325 mg by mouth every 6 hours as needed      amLODIPine (NORVASC) 5 MG tablet TAKE 1 TABLET BY MOUTH DAILY     ammonium lactate (AMLACTIN) 12 % external cream Apply topically 2 times daily     aspirin (ASPIRIN LOW DOSE) 81 MG chewable tablet CHEW 1 TABLET BY MOUTH DAILY. DO NOT SPLIT OR CRUSH     atorvastatin (LIPITOR) 40 MG tablet TAKE 1 TABLET BY MOUTH AT BEDTIME     budesonide-formoterol (SYMBICORT) 160-4.5 MCG/ACT Inhaler Inhale 2 puffs into the lungs 2 times daily     Cholecalciferol (VITAMIN D3) 50 MCG (2000 UT) CAPS TAKE 1 CAPSULE BY MOUTH DAILY     COMBIVENT RESPIMAT  MCG/ACT inhaler INHALE 1 PUFF INTO THE LUNGS 4 TIMES DAILY     Continuous Blood Gluc Sensor (FREESTYLE HANK 2 SENSOR) MISC 1 each every 14 days Use 1 sensor every 14 days. Use to read blood sugars per 's instructions.     docusate sodium (COLACE) 100 MG capsule TAKE 1 CAPSULE BY MOUTH DAILY     hydrocortisone (CORTAID) 1 % external cream Apply topically 2 times daily     hydrocortisone 2.5 % cream Apply topically 2 times daily     insulin aspart (NOVOLOG VIAL) 100 UNITS/ML vial To be used with the insulin pump.  TDD: 70 units     Insulin Disposable Pump (OMNIPOD DASH PODS, GEN 4,) MISC 1 each every 48 hours     insulin glargine U-300 (TOUJEO SOLOSTAR) 300 UNIT/ML (1 units dial) pen Inject 20 Units Subcutaneous At Bedtime     insulin pen needle (32G X 4 MM) 32G X 4 MM miscellaneous Use 1 pen needles daily     INSULIN PUMP - OUTPATIENT Insulin pump: Omnipod Dash  Updated:  11/23/22  Basal: 0.9 (new)  Total basal: 21.6 units  CR: 12  ISF: 45  BG target: 100  Active insulin: 4 hours     JARDIANCE 25 MG TABS tablet TAKE 1 TABLET BY MOUTH DAILY     lisinopril (ZESTRIL) 40 MG tablet TAKE 1 TABLET BY MOUTH DAILY     nystatin (MYCOSTATIN) 083364 UNIT/GM external powder Apply topically 3 times daily     OneTouch Delica Lancets 33G MISC Inject 1 each Subcutaneous 3 times daily (before meals)     ONETOUCH ULTRA test strip USE TO TEST BLOOD SUGAR 4 TIMES DAILY     order for DME Women briefs     primidone (MYSOLINE) 50 MG tablet TAKE 1 TABLET BY MOUTH AT BEDTIME     urea (GORDONS UREA) 40 % external ointment Apply topically 2 times daily     No current facility-administered medications for this visit.       Are you currently having any of the following symptoms?   General:   Obvious weight gain or loss NO  Fever, chills or sweats YES  Drug allergies: no    Eyes:   Changes in vision NO  Blind spots NO  Double vision NO  Other no    Ear, Nose and Throat:   Ear pain NO  Sore throat NO  Sinus pain NO  Post-nasal drip NO  Runny nose YES  Bloody nose NO    Heart:   Rapid or irregular heart beat NO  Chest pain or pressure NO  Out of breath when lying down YES  Swelling in feet or legs NO  High blood pressure YES  Heart disease NO    Nervous system   Headaches YES  Weakness in arms or legs NO  Numbness in arms of legs NO  Other: no    Skin  Rashes NO  New moles or skin changes NO  Other no    Lungs  Shortness of breath at rest NO  Shortness of breath with activity NO  Dry cough YES  Coughing up mucous or phlegm NO  Coughing up blood NO  Wheezing when breathing YES    Lymph System  Swollen lymph nodes NO  New lumps or bumps NO  Changes in breasts or discharge NO    Digestive System   Nausea or vomiting NO  Loose or watery stools NO  Hard, dry stools (constipation) NO  Fat or grease in stools NO  Blood in stools NO  Stools are black or bloody NO  Abdominal (belly) pain NO    Urinary Tract   Pain when you  urinate (pee) NO  Blood in your urine NO  Urinate (pee) more than normal YES  Irregular periods NO    Muscles and bones   Muscle pain NO  Joint or bone pain NO  Swollen joints NO  Other no    Glands  Increased thirst or urination NO  Diabetes YES  Morning glucose: yes?  Afternoon glucose: ?    Mental Health  Depression NO  Anxiety NO  Other mental health issues: no

## 2023-02-27 NOTE — PROGRESS NOTES
LURDES EVGENY       Name: Marly Cannon MRN# [id number]   Age: 57 year old YOB: 1965     Stop Bang questionnaire completed with a score of >3 to allow for HST     Have you been told you snore loudly (louder than talking or loud enough to be heard through doors)? NO    Do you often feel tired, fatigued, or sleepy during the daytime? YES    Has anyone observed you stop breathing during your sleep? NO    Do you have or are you being treated for high blood pressure? YES    Is your BMI greater than 35? NO    Is your neck size circumference 16 inches or greater? NO    Are you over 50 years old? YES    Stop Bang Score (# of yes): 3

## 2023-02-28 DIAGNOSIS — I10 ESSENTIAL HYPERTENSION: ICD-10-CM

## 2023-02-28 RX ORDER — LISINOPRIL 40 MG/1
TABLET ORAL
Qty: 90 TABLET | Refills: 0 | Status: SHIPPED | OUTPATIENT
Start: 2023-02-28 | End: 2023-06-01

## 2023-02-28 NOTE — TELEPHONE ENCOUNTER
Lisinopril 40mg      Last Written Prescription Date:  11/23/22  Last Fill Quantity: 90,   # refills: 0  Last Office Visit: 1/17/23  Future Office visit:    Next 5 appointments (look out 90 days)    Apr 19, 2023  3:00 PM  (Arrive by 2:45 PM)  Office Visit with Amberly Whyte NP  [facility] ([facility] [place] ) [address]  [place] [address]  [phone]           Routing refill request to provider for review/approval because:

## 2023-03-01 RX ORDER — AMLODIPINE BESYLATE 5 MG/1
TABLET ORAL
Qty: 90 TABLET | Refills: 0 | Status: SHIPPED | OUTPATIENT
Start: 2023-03-01 | End: 2023-06-01

## 2023-03-02 ENCOUNTER — VIRTUAL VISIT (OUTPATIENT)
Dept: PULMONOLOGY | Facility: OTHER | Age: 58
End: 2023-03-02
Attending: FAMILY MEDICINE
Payer: COMMERCIAL

## 2023-03-02 VITALS — WEIGHT: 150 LBS | HEIGHT: 64 IN | BODY MASS INDEX: 25.61 KG/M2

## 2023-03-02 DIAGNOSIS — J96.21 ACUTE AND CHRONIC RESPIRATORY FAILURE WITH HYPOXIA (H): ICD-10-CM

## 2023-03-02 DIAGNOSIS — I10 ESSENTIAL HYPERTENSION: ICD-10-CM

## 2023-03-02 DIAGNOSIS — G47.33 OSA (OBSTRUCTIVE SLEEP APNEA): Primary | ICD-10-CM

## 2023-03-02 PROCEDURE — 99443 PR PHYSICIAN TELEPHONE EVALUATION 21-30 MIN: CPT | Mod: 93 | Performed by: FAMILY MEDICINE

## 2023-03-02 ASSESSMENT — SLEEP AND FATIGUE QUESTIONNAIRES
HOW LIKELY ARE YOU TO NOD OFF OR FALL ASLEEP WHEN YOU ARE A PASSENGER IN A CAR FOR AN HOUR WITHOUT A BREAK: MODERATE CHANCE OF DOZING
HOW LIKELY ARE YOU TO NOD OFF OR FALL ASLEEP WHILE SITTING AND READING: WOULD NEVER DOZE
HOW LIKELY ARE YOU TO NOD OFF OR FALL ASLEEP WHILE SITTING AND TALKING TO SOMEONE: WOULD NEVER DOZE
HOW LIKELY ARE YOU TO NOD OFF OR FALL ASLEEP WHILE SITTING QUIETLY AFTER LUNCH WITHOUT ALCOHOL: WOULD NEVER DOZE
HOW LIKELY ARE YOU TO NOD OFF OR FALL ASLEEP IN A CAR, WHILE STOPPED FOR A FEW MINUTES IN TRAFFIC: WOULD NEVER DOZE
HOW LIKELY ARE YOU TO NOD OFF OR FALL ASLEEP WHILE LYING DOWN TO REST IN THE AFTERNOON WHEN CIRCUMSTANCES PERMIT: MODERATE CHANCE OF DOZING
HOW LIKELY ARE YOU TO NOD OFF OR FALL ASLEEP WHILE WATCHING TV: HIGH CHANCE OF DOZING
HOW LIKELY ARE YOU TO NOD OFF OR FALL ASLEEP WHILE SITTING INACTIVE IN A PUBLIC PLACE: WOULD NEVER DOZE

## 2023-03-02 NOTE — NURSING NOTE
Is the patient currently in the state of [place]? YES    Visit mode:TELEPHONE    If the visit is dropped, the patient can be reconnected by: TELEPHONE VISIT: Phone number: [phone]    Will anyone else be joining the visit? NO    How would you like to obtain your AVS? Mail a copy    Are changes needed to the allergy or medication list? NO    Reason for visit:   Insomnia and Severe ADEEL follow up    No flu shot    Mini Mayes, GARY/WILFREDO

## 2023-03-02 NOTE — PROGRESS NOTES
Marly Cannon is a 57 year old female who is being evaluated via a billable telephone visit.       What phone number would you like to be contacted at?@ [phone]  Home Phone [phone]   Work Phone Not on file.   Mobile [phone]       How would you like to obtain your AVS? [employer]       Telephone Virtual Visit Details     Type of service:  Telephone Virtual Visit     Originating Location (pt. Location): Home     Distant Location (provider location):  Off-site, [facility]      Start Time: 11am  End Time: 11:20am    Virtual visit for history of severe obstructive sleep apnea with nearly continuous nocturnal hypoxemia.     Assessment / Plan:    1.) Severe ADEEL with severe sleep-associated hypoxemia and sustained hypoxemia, with increased concern for hypoventilation.  2.)  Unclear underlying pulmonary disease with presumed obstructive / restrictive disease overlap with severe diffusion impairment.  3.)  Ongoing smoking with prior elevated carbon monoxide levels  4.)  Obesity (BMI ~ 34)  5.)  Polycythemia (currently presumed 2/2 hypoxemia)    - Unable to perform PAP titration PSG at time of initial testing given COVID precautions  - Unable to tolerate bilevel PAP auto-titrate with min EPAP 5, max IPAP 20, PS 7 or alternate settings of min EPAP 5, max IPAP 15, PS 4 on room air.    Overall, our plan today is:  - All-night bilevel PAP titration PSG with pre-study VBG and likely need for additional supplemental oxygen  - Repeat full PFT's  - Echocardiogram with bubble study to assess for LV function, suggestion of pulmonary HTN and if evidence of shunt    Once we have the above results, plan for pulmonology consult along with recommendations on tobacco cessation and weight management.    SUBJECTIVE:  Marly Cannon is a 57 year old female.    57 year old yo female who is referred for chronic insomnia, history of severe ADEEL.    Pertinent PMHx of morbid obesity, HTN, elevated Hgb, DM II,  COPD.    Most recent PFT's on 3/11/2021 with weight 202 lbs.  Unclear reliability given patient inability following directions for maneuvers.  Post-bronchodilators assessment attempted, but patient reported fatigue and again inability to follow directions for maneuvers.  If accurate, spirometry suggestive of obstructive and restrictive disease, significant diffusion impairment with DLCOcor 52.    No recent echocardiograms for review.    Elevated carbon monoxide level of 3.4 on 3/11/2021.     Latest Reference Range & Units 14 16:38 08/18/15 07:36 16 16:48 18 09:23 19 11:30 20 13:35 20 08:42 21 14:03 22 13:49 10/17/22 08:59 23 15:05   Hemoglobin 11.7 - 15.7 g/dL 16.4 (H) 14.0 16.6 (H) 17.9 (H) 16.9 (H) 16.1 (H) 16.7 (H) 16.3 (H) 18.6 (H) 17.3 (H) 17.1 (H)   (H): Data is abnormally high    Prior Sleep Testin2020 - PSG with weight 202 lbs.  AHI 43.1, events appearing primarily obstructive in nature.  Mean Spo2 86%, isabel SpO2 78%, 96.2% of recording was <= 89%.  No clear REM observed.  No PLM's.    2021 - Marly feels that the bilevel is very uncomfortable and she could not breathe in or out while trying to use it.  Overall usage was minimal.  She is open to retrying the bilevel if we can decrease the pressures.  A/P to decrease bilevel PAP from min EPAP 5 / max IPAP 20 / PS 7 to min EPAP 5 / max IPAP 15 / PS 4 on room air.    2021 - Bilevel PAP returned given non-compliance.    Today -she reports that she is not sure why she had this visit today.  We discussed my concerns in relation to the untreated severe obstructive sleep apnea and severe and sustained nocturnal hypoxemia.  We also discussed in detail our work-up evaluation for ventilation/perfusion mismatch, as well as finding effective treatment for severe obstructive sleep apnea and sustained hypoxemia.    Past medical history:    Patient Active Problem List    Diagnosis Date Noted      Diabetic polyneuropathy associated with diabetes mellitus due to underlying condition (H) 01/17/2023     Priority: Medium     Urinary incontinence, unspecified type 01/17/2023     Priority: Medium     ADEEL (obstructive sleep apnea) 02/16/2021     Priority: Medium     Interp for PSG dated 12/16/2020.     Weight 202 lbs.  Total sleep time 417.5 minutes, sleep efficiency 83%, sleep latency 15 minutes, REM latency - minutes.  Arousal index 45.6.  Sleep architecture was incredibly fragmented with no clear REM observed.     AHI 43.1, events appearing primarily obstructive in nature.  Mean Spo2 86%, isabel SpO2 78%, 96.2% of recording was <= 89%.       EKG appeared NSR.     No PLM's observed.      Unable to assess for phasic or tonic EMG activity in REM.  No abnormal nocturnal behaviors observed.     Assessment:  - Severe ADEEL with severe sleep-associated hypoxemia and sustained hypoxemia, with increased concern for hypoventilation.  - Potential that severity under-reported given highly fragmented sleep architecture and no clear REM observed.       Memory disturbance 02/13/2020     Priority: Medium     Callus of foot 05/29/2019     Priority: Medium     Hyperlipidemia, unspecified hyperlipidemia type 05/28/2019     Priority: Medium     Essential hypertension 05/28/2019     Priority: Medium     H/O colonoscopy 04/24/2019     Priority: Medium     Had in 10/2018, due for repeat 5 years       [place] cardiac risk <10% in next 10 years 02/22/2019     Priority: Medium     Overview:   Started high intensity statin.        Tubular adenoma of colon 09/28/2018     Priority: Medium     Intellectual disability 08/01/2017     Priority: Medium     ACP (advance care planning) 09/09/2016     Priority: Medium     Advance Care Planning 9/9/2016: ACP Review of Chart / Resources Provided:  Reviewed chart for advance care plan.  Marly Cannon has been provided information and resources to begin or update their advance care plan.  Added by  Naty Allen             Hyperparathyroidism due to renal insufficiency (H) 06/14/2016     Priority: Medium     Vitamin D deficiency 06/14/2016     Priority: Medium     Microalbuminuria due to type 2 diabetes mellitus (H) 06/14/2016     Priority: Medium     Stage 3 chronic kidney disease (H) 02/12/2016     Priority: Medium     Overview:   Updated per 10/1/17 IMO import       Pulmonary emphysema (H) 08/21/2015     Priority: Medium     Confirmed via PFTs in 8/2015       Type 2 diabetes, HbA1c goal < 7% (H) 07/24/2015     Priority: Medium     Tobacco abuse 07/09/2015     Priority: Medium     Tremor 11/09/2009     Priority: Medium     Formatting of this note might be different from the original.  Shakes head       Breast fibrocystic disorder 02/22/2008     Priority: Medium     Overview:   IMO Update 10/11         10 point ROS of systems including Constitutional, Eyes, Respiratory, Cardiovascular, Gastroenterology, Genitourinary, Integumentary, Muscularskeletal, Psychiatric were all negative except for pertinent positives noted in my HPI.    Current Outpatient Medications   Medication Sig Dispense Refill     amLODIPine (NORVASC) 5 MG tablet TAKE 1 TABLET BY MOUTH DAILY 90 tablet 0     Acetaminophen (TYLENOL PO) Take 325 mg by mouth every 6 hours as needed        ammonium lactate (AMLACTIN) 12 % external cream Apply topically 2 times daily       aspirin (ASPIRIN LOW DOSE) 81 MG chewable tablet CHEW 1 TABLET BY MOUTH DAILY. DO NOT SPLIT OR CRUSH 90 tablet 2     atorvastatin (LIPITOR) 40 MG tablet TAKE 1 TABLET BY MOUTH AT BEDTIME 90 tablet 2     budesonide-formoterol (SYMBICORT) 160-4.5 MCG/ACT Inhaler Inhale 2 puffs into the lungs 2 times daily 1 Inhaler 3     Cholecalciferol (VITAMIN D3) 50 MCG (2000 UT) CAPS TAKE 1 CAPSULE BY MOUTH DAILY 90 capsule 3     COMBIVENT RESPIMAT  MCG/ACT inhaler INHALE 1 PUFF INTO THE LUNGS 4 TIMES DAILY 4 g 3     Continuous Blood Gluc Sensor (FREESTYLE HANK 2 SENSOR) MISC 1 each  every 14 days Use 1 sensor every 14 days. Use to read blood sugars per 's instructions. 2 each 5     docusate sodium (COLACE) 100 MG capsule TAKE 1 CAPSULE BY MOUTH DAILY 90 capsule 3     hydrocortisone (CORTAID) 1 % external cream Apply topically 2 times daily 453.6 g 0     hydrocortisone 2.5 % cream Apply topically 2 times daily       insulin aspart (NOVOLOG VIAL) 100 UNITS/ML vial To be used with the insulin pump.  TDD: 70 units 30 mL 3     Insulin Disposable Pump (OMNIPOD DASH PODS, GEN 4,) MISC 1 each every 48 hours 15 each 5     insulin glargine U-300 (TOUJEO SOLOSTAR) 300 UNIT/ML (1 units dial) pen Inject 20 Units Subcutaneous At Bedtime 9 mL 1     insulin pen needle (32G X 4 MM) 32G X 4 MM miscellaneous Use 1 pen needles daily 100 each 3     INSULIN PUMP - OUTPATIENT Insulin pump: Omnipod Dash  Updated: 11/23/22  Basal: 0.9 (new)  Total basal: 21.6 units  CR: 12  ISF: 45  BG target: 100  Active insulin: 4 hours       JARDIANCE 25 MG TABS tablet TAKE 1 TABLET BY MOUTH DAILY 30 tablet 1     lisinopril (ZESTRIL) 40 MG tablet TAKE 1 TABLET BY MOUTH DAILY 90 tablet 0     nystatin (MYCOSTATIN) 155766 UNIT/GM external powder Apply topically 3 times daily 60 g 3     OneTouch Delica Lancets 33G MISC Inject 1 each Subcutaneous 3 times daily (before meals) 100 each 11     ONETOUCH ULTRA test strip USE TO TEST BLOOD SUGAR 4 TIMES DAILY 100 strip 1     order for DME Women briefs 50 each 1     primidone (MYSOLINE) 50 MG tablet TAKE 1 TABLET BY MOUTH AT BEDTIME 30 tablet 11     urea (GORDONS UREA) 40 % external ointment Apply topically 2 times daily 30 g 0       OBJECTIVE:  There were no vitals taken for this visit.    Physical Exam:  healthy, alert and no distress  PSYCH: Alert and oriented times 3; coherent speech, normal   rate and volume, able to articulate logical thoughts, able   to abstract reason, no tangential thoughts, no hallucinations   or delusions  His affect is normal  RESP: No cough, no audible  wheezing, able to talk in full sentences  Remainder of exam unable to be completed due to telephone visits    ---  This note was written with the assistance of the Dragon voice-dictation technology software. The final document, although reviewed, may contain errors. For corrections, please contact the office.    Total time spent preparing to see the patient, review of chart, obtaining history and physical examination, review of sleep testing, review of treatment options, education, discussion with patient and documenting in Epic / EMR was 30 minutes.  All time involved was spent on the day of service for the patient (the same day as the patient's appointment).    Anupam Duval MD    Sleep Medicine    [place], [place]  o Main Office: [phone]    [facility], [facility] [place], [place]  o [address], [address]  o Schedule visits: [phone]  o Main Office: [phone]  o Fax: [phone]     45.5

## 2023-03-07 DIAGNOSIS — G47.33 OSA (OBSTRUCTIVE SLEEP APNEA): Primary | ICD-10-CM

## 2023-03-08 ENCOUNTER — ALLIED HEALTH/NURSE VISIT (OUTPATIENT)
Dept: EDUCATION SERVICES | Facility: OTHER | Age: 58
End: 2023-03-08
Attending: NURSE PRACTITIONER
Payer: COMMERCIAL

## 2023-03-08 VITALS
HEIGHT: 64 IN | OXYGEN SATURATION: 91 % | HEART RATE: 95 BPM | SYSTOLIC BLOOD PRESSURE: 122 MMHG | WEIGHT: 192.6 LBS | RESPIRATION RATE: 16 BRPM | DIASTOLIC BLOOD PRESSURE: 80 MMHG | BODY MASS INDEX: 32.88 KG/M2

## 2023-03-08 DIAGNOSIS — Z79.4 TYPE 2 DIABETES MELLITUS WITH HYPERGLYCEMIA, WITH LONG-TERM CURRENT USE OF INSULIN (H): ICD-10-CM

## 2023-03-08 DIAGNOSIS — F17.200 NICOTINE DEPENDENCE, UNCOMPLICATED, UNSPECIFIED NICOTINE PRODUCT TYPE: Primary | ICD-10-CM

## 2023-03-08 DIAGNOSIS — E11.65 TYPE 2 DIABETES MELLITUS WITH HYPERGLYCEMIA, WITH LONG-TERM CURRENT USE OF INSULIN (H): ICD-10-CM

## 2023-03-08 PROCEDURE — G0463 HOSPITAL OUTPT CLINIC VISIT: HCPCS

## 2023-03-08 PROCEDURE — 99214 OFFICE O/P EST MOD 30 MIN: CPT | Mod: 25 | Performed by: NURSE PRACTITIONER

## 2023-03-08 PROCEDURE — 95251 CONT GLUC MNTR ANALYSIS I&R: CPT | Performed by: NURSE PRACTITIONER

## 2023-03-08 ASSESSMENT — PAIN SCALES - GENERAL: PAINLEVEL: NO PAIN (0)

## 2023-03-08 NOTE — PATIENT INSTRUCTIONS
Continue working on healthy eating and moving (start low and slow, work up to 30 min, 5x/week)    BG goals:  Fasting and before meals <130, >70  2 hour after eating <180    We only need 1/2 of these numbers to be within target then your A1c will be within target    Medication changes   Watch for low BGs after insulin pump adjustment    Follow up   Download when you see Amberly Whyte NP  3 weeks    Call me sooner if any problems/concerns and/or questions develop including consistent low BGs <70 or consistent high BGs >200  [phone] (Unit Coordinator)    [phone] (Anna Nurse)    Smoking:   Try keep working on reducing the amount of cigarettes

## 2023-03-08 NOTE — PROGRESS NOTES
SUBJECTIVE:  Marly Cannon, 57 year old, female presents with the following Chief Complaint(s) with HPI to follow:  Chief Complaint   Patient presents with     Diabetes        Diabetes Follow-up    Patient is checking blood sugars: >4x/day using her personal CGM (Aternitystyle Bud).  Results:    Date: 2/23 to 3/8/23  Glucose management indicator: 12.7%    Average glucose: 393    Glucose range  Very low (<54): 0  low (<70): 0  In target range (): 0  High (>180): 0  Very high (>250): 100%      Symptoms of hypoglycemia (low blood sugar): no    Paresthesias (numbness or burning in feet) or sores: no, no sores    Diabetic eye exam within the last year: UTD    Breakfast eaten regularly: most of the time    Patient counting carbs: Yes    Exercising: depends on the weather          HPI:  Faisals here today for the follow up regarding her Diabetes mellitus, Type 2.    A1c  Lab Results   Component Value Date    A1C 15.1 01/17/2023    A1C 11.4 10/17/2022    A1C 10.4 07/15/2022    A1C 10.8 04/15/2022    A1C 8.6 01/14/2022    A1C >14.0 05/10/2021    A1C 10.7 02/10/2021    A1C 7.6 11/09/2020    A1C 7.9 08/07/2020    A1C 8.1 05/04/2020     Current Diabetes medication:   1. Omnipod dash, using Novolog--not using it  2. Jardiance 25 mg daily--not taking  Statin use: yes, simvastatin 20 mg daily  ASA: yes, 81 mg daily    Marly's here a download of her CGM and insulin pump with possible diabetic medication adjustments.   Reports the following:  No issues with the Freestyle Bud   Not wearing the Omnipod    Otherwise, denies any new health concerns.     Weight trend   Wt Readings from Last 4 Encounters:   03/08/23 87.4 kg (192 lb 9.6 oz)   03/02/23 68 kg (150 lb)   01/17/23 89.5 kg (197 lb 4.8 oz)   01/05/23 91.7 kg (202 lb 1.6 oz)       Patient Active Problem List   Diagnosis     Type 2 diabetes, HbA1c goal < 7% (H)     ACP (advance care planning)     Stage 3 chronic kidney disease (H)     Pulmonary emphysema (H)      Hyperparathyroidism due to renal insufficiency (H)     Vitamin D deficiency     Intellectual disability     Breast fibrocystic disorder     Tobacco abuse     Microalbuminuria due to type 2 diabetes mellitus (H)     H/O colonoscopy     [place] cardiac risk <10% in next 10 years     Tubular adenoma of colon     Hyperlipidemia, unspecified hyperlipidemia type     Essential hypertension     Callus of foot     Memory disturbance     ADEEL (obstructive sleep apnea)     Tremor     Diabetic polyneuropathy associated with diabetes mellitus due to underlying condition (H)     Urinary incontinence, unspecified type       History reviewed. No pertinent past medical history.    Past Surgical History:   Procedure Laterality Date     BIOPSY BREAST Left 02/14/2008    patient states no bx, Clip in left breast/ stereo bxc 2-- no path in [facility] that far back     COLONOSCOPY N/A 08/20/2015    Procedure: COLONOSCOPY;  Surgeon: Gentry Porter MD;  Location: [facility]     COLONOSCOPY N/A 09/21/2018    Procedure: COLONOSCOPY;  COLONOSCOPY WITH POLYPECTOMY;  Surgeon: Gentry Porter MD;  Location: [facility]       Family History   Problem Relation Age of Onset     Diabetes Mother      Diabetes Father      Hypertension Brother      Diabetes Sister        Social History     Tobacco Use     Smoking status: Every Day     Packs/day: 1.00     Years: 34.00     Pack years: 34.00     Types: Cigarettes     Start date: 1/1/1979     Smokeless tobacco: Never     Tobacco comments:     Declined QP 05/13/2020   Substance Use Topics     Alcohol use: No     Alcohol/week: 0.0 standard drinks       Current Outpatient Medications   Medication Sig Dispense Refill     Acetaminophen (TYLENOL PO) Take 325 mg by mouth every 6 hours as needed        amLODIPine (NORVASC) 5 MG tablet TAKE 1 TABLET BY MOUTH DAILY 90 tablet 0     ammonium lactate (AMLACTIN) 12 % external cream Apply topically 2 times daily       aspirin (ASPIRIN LOW DOSE) 81 MG chewable tablet  CHEW 1 TABLET BY MOUTH DAILY. DO NOT SPLIT OR CRUSH 90 tablet 2     atorvastatin (LIPITOR) 40 MG tablet TAKE 1 TABLET BY MOUTH AT BEDTIME 90 tablet 2     budesonide-formoterol (SYMBICORT) 160-4.5 MCG/ACT Inhaler Inhale 2 puffs into the lungs 2 times daily 1 Inhaler 3     Cholecalciferol (VITAMIN D3) 50 MCG (2000 UT) CAPS TAKE 1 CAPSULE BY MOUTH DAILY 90 capsule 3     COMBIVENT RESPIMAT  MCG/ACT inhaler INHALE 1 PUFF INTO THE LUNGS 4 TIMES DAILY 4 g 3     Continuous Blood Gluc Sensor (FREESTYLE HANK 2 SENSOR) [facility] 1 each every 14 days Use 1 sensor every 14 days. Use to read blood sugars per 's instructions. 2 each 5     docusate sodium (COLACE) 100 MG capsule TAKE 1 CAPSULE BY MOUTH DAILY 90 capsule 3     hydrocortisone (CORTAID) 1 % external cream Apply topically 2 times daily 453.6 g 0     hydrocortisone 2.5 % cream Apply topically 2 times daily       insulin pen needle (32G X 4 MM) 32G X 4 MM miscellaneous Use 1 pen needles daily 100 each 3     INSULIN PUMP - OUTPATIENT Insulin pump: Omnipod Dash  Updated: 11/23/22  Basal: 0.9 (new)  Total basal: 21.6 units  CR: 12  ISF: 45  BG target: 100  Active insulin: 4 hours       JARDIANCE 25 MG TABS tablet TAKE 1 TABLET BY MOUTH DAILY 30 tablet 1     lisinopril (ZESTRIL) 40 MG tablet TAKE 1 TABLET BY MOUTH DAILY 90 tablet 0     nystatin (MYCOSTATIN) 743890 UNIT/GM external powder Apply topically 3 times daily 60 g 3     OneTouch Delica Lancets 33G MISC Inject 1 each Subcutaneous 3 times daily (before meals) 100 each 11     ONETOUCH ULTRA test strip USE TO TEST BLOOD SUGAR 4 TIMES DAILY 100 strip 1     order for DME Women briefs 50 each 1     primidone (MYSOLINE) 50 MG tablet TAKE 1 TABLET BY MOUTH AT BEDTIME 30 tablet 11     urea (GORDONS UREA) 40 % external ointment Apply topically 2 times daily 30 g 0     insulin aspart (NOVOLOG VIAL) 100 UNITS/ML vial To be used with the insulin pump.  TDD: 70 units (Patient not taking: Reported on 3/8/2023) 30 mL 3  "    Insulin Disposable Pump (OMNIPOD DASH PODS, GEN 4,) MISC 1 each every 48 hours (Patient not taking: Reported on 3/8/2023) 15 each 5     insulin glargine U-300 (TOUJEO SOLOSTAR) 300 UNIT/ML (1 units dial) pen Inject 20 Units Subcutaneous At Bedtime (Patient not taking: Reported on 3/8/2023) 9 mL 1       No Known Allergies    REVIEW OF SYSTEMS  Skin: negative  Eyes: negative, wears glasses    Ears/Nose/Throat: negative  Respiratory: No shortness of breath or hemoptysis; smoker's cough; hx of sleep apnea  Cardiovascular: negative  Gastrointestinal: negative  Genitourinary: negative  Musculoskeletal: negative; hx of left shoulder pain and left knee--depends on the weather   Neurologic: negative  Psychiatric: negative  Hematologic/Lymphatic/Immunologic: negative  Endocrine: positive for diabetes     OBJECTIVE:  /80   Pulse 95   Resp 16   Ht 1.626 m (5' 4\")   Wt 87.4 kg (192 lb 9.6 oz)   SpO2 91%   BMI 33.06 kg/m    Constitutional: alert and no distress  Respiratory:  Good diaphragmatic excursion.   Musculoskeletal: extremities normal- no gross deformities noted and gait normal  Skin: no suspicious lesions or rashes to visible skin  Psychiatric: mentation appears normal and affect normal/bright      LABS  No results found for any visits on 03/08/23.    ASSESSMENT / PLAN:  (F17.200) Nicotine dependence, uncomplicated, unspecified nicotine product type  (primary encounter diagnosis)  Comment: noted, refused  Plan:   Spoke with patient regarding options for smoking cessation.  Various pharmacologic options and behavioral techniques were reviewed.  The relative effectiveness as well as risks and benefits were reviewed with patient.  Patient is interested in: No interventions    Smoking Cessation    Marly's aware to let us know when she's ready to quit smoking.  Discussed the dangers of smoking with diabetes    (E11.65,  Z79.4) Type 2 diabetes mellitus with hyperglycemia, with long-term current use of insulin " (H)  Comment: noted CGM  Plan: GLUCOSE MONITOR, 72 HOUR, PHYS NAOMI          Assisted Marly on placing another omnipod Dash (using Fiasp)  Marly was able to put it on herself.      Highly encouraged her to keep using the Omnipod.   If she has ANY questions, please contact me.      Keep taking your Jardiance per Amberly Whyte NP's note  It's worked in the past    Follow up  3 weeks    Call sooner if any issues    Patient Instructions   Continue working on healthy eating and moving (start low and slow, work up to 30 min, 5x/week)    BG goals:  Fasting and before meals <130, >70  2 hour after eating <180    We only need 1/2 of these numbers to be within target then your A1c will be within target    Medication changes   Watch for low BGs after insulin pump adjustment    Follow up   Download when you see Amberly Whyte NP  3 weeks    Call me sooner if any problems/concerns and/or questions develop including consistent low BGs <70 or consistent high BGs >200  [phone] (Unit Coordinator)    [phone] (Anna Nurse)    Smoking:   Try keep working on reducing the amount of cigarettes                 Time: 30 minutes  Barrier: see [facility]  Willingness to learn: accepting    Courtney PINTO [facility]  Diabetes and Wound Care    Cc: Amberly Whyte NP    30 minutes was spent with patient.    All of this time was spent on counseling patient regarding illness, medication and/or treatment options, coordinating further cares and follow ups that are needed along with resource material that will be helpful in the treatment of these issues.         With the electronic record, we can now more quickly and easily track our patient diabetic goals. Our diabetes clinical review is in progress and these are the indicators we are monitoring for good diabetes health.     1.) HbA1C less than 7 (measurement of your average blood sugars)  2.) Blood Pressure less than 140/80  3.) LDL less than 100 (bad cholesterol)  4.) HbA1C is checked in the  last 6 months and below 7% (more frequently if not at goal or adjusting medications)  5.) LDL is checked in the last 12 months (more frequently if not at goal or adjusting medications)  6.) Taking one baby aspirin daily (unless otherwise instructed)  7.) No tobacco use  8) Statin use     You have achieved 6 out of 8 of these and I am encouraging you to come in and get tuned up to achieve 8 out of 8!  Here is what you have achieved so far in my goals for you:  1.) HbA1C  less than 7:                              NO  Your last  HbA1C :  Lab Results   Component Value Date    A1C 15.1 01/17/2023    A1C 11.4 10/17/2022    A1C 10.4 07/15/2022    A1C 10.8 04/15/2022    A1C 8.6 01/14/2022    A1C >14.0 05/10/2021    A1C 10.7 02/10/2021    A1C 7.6 11/09/2020    A1C 7.9 08/07/2020    A1C 8.1 05/04/2020     2.) Blood Pressure less than 140/80:       YES      Your last                       BP Readings from Last 1 Encounters:   03/08/23 122/80     3.) LDL less than 100:                              YES      Your last     LDL Cholesterol Calculated   Date Value Ref Range Status   07/15/2022 66 <=100 mg/dL Final   11/09/2020 74 <100 mg/dL Final     Comment:     Desirable:       <100 mg/dl       4.) Checked HbA1C in the past 6 months: YES      5.) Checked LDL in the past 12 months:    YES      6.) Taking one aspirin daily:                       YES     7.) No tobacco use:                                        NO   8.) Statin use      YES

## 2023-03-10 ENCOUNTER — VIRTUAL VISIT (OUTPATIENT)
Dept: PHARMACY | Facility: PHYSICIAN GROUP | Age: 58
End: 2023-03-10
Payer: COMMERCIAL

## 2023-03-10 DIAGNOSIS — R21 RASH: ICD-10-CM

## 2023-03-10 DIAGNOSIS — R52 PAIN: ICD-10-CM

## 2023-03-10 DIAGNOSIS — R25.1 TREMOR: ICD-10-CM

## 2023-03-10 DIAGNOSIS — I10 ESSENTIAL HYPERTENSION: ICD-10-CM

## 2023-03-10 DIAGNOSIS — J43.9 PULMONARY EMPHYSEMA, UNSPECIFIED EMPHYSEMA TYPE (H): ICD-10-CM

## 2023-03-10 DIAGNOSIS — E78.5 HYPERLIPIDEMIA, UNSPECIFIED HYPERLIPIDEMIA TYPE: ICD-10-CM

## 2023-03-10 DIAGNOSIS — E11.9 TYPE 2 DIABETES, HBA1C GOAL < 7% (H): Primary | ICD-10-CM

## 2023-03-10 DIAGNOSIS — Z78.9 TAKES DIETARY SUPPLEMENTS: ICD-10-CM

## 2023-03-10 PROCEDURE — 99605 MTMS BY PHARM NP 15 MIN: CPT

## 2023-03-10 NOTE — LETTER
"Recommended To-Do List      Prepared on: Mar 10, 2023       You can get the best results from your medications by completing the items on this \"To-Do List.\"      Bring your To-Do List when you go to your doctor. And, share it with your family or caregivers.    My To-Do List:  What we talked about: What I should do:                       "

## 2023-03-10 NOTE — PROGRESS NOTES
Medication Therapy Management (MTM) Encounter    ASSESSMENT:                            Medication Adherence/Access: No issues identified    Type 2 Diabetes: Patient is not meeting A1c goal of < 7%. Patient would benefit from continued management of blood sugars with diabetic education as well as adherence to medications for diabetes.     Hypertension: Stable.    Hyperlipidemia: Stable.    Emphysema: Stable.     Tremor: Stable.    Skin: Stable.    Pain: Stable.    Supplements: Stable.     PLAN:                            1. Continue to follow-up with diabetic education for management of your insulin pump and blood sugar monitoring.     Follow-up: 6 months, sooner if needed.     SUBJECTIVE/OBJECTIVE:                          Marly Cannon is a 57 year old female called for an initial visit. She was referred to me from her insurance company, [email or web address].      Reason for visit: Initial visit - comprehensive medication review.    Allergies/ADRs: Reviewed in chart  Past Medical History: Reviewed in chart  Tobacco: She reports that she has been smoking cigarettes. She started smoking about 44 years ago. She has a 34.00 pack-year smoking history. She has never used smokeless tobacco.Nicotine/Tobacco Cessation Plan:   Information offered: Patient not interested at this time  Alcohol: none  Caffeine: regular soda - drinks 2 cans per day.     Medication Adherence/Access: no issues reported  Patient uses pill box(es). Set up weekly by a nurse.   Patient takes medications 2 time(s) per day.   Per patient, misses medication 0 times per week.   Medication barriers: none.   The patient fills medications at [facility]: NO, fills medications at [facility].    Type 2 Diabetes:  Currently taking Jardiance 25 mg daily, insulin aspart to be used in insulin pump (total daily dose 70 units), insulin glargine U-300 (Toujeo) 20 units subcutaneously at bedtime (for when the patient is not using the insulin pump). Patient is not  experiencing side effects. Patient recently restarted these medications with diabetic education.   Blood sugar monitoring: Continuous Glucose Monitor. Ranges (patient reported): See below  Fasting- in the 100s mg/dL per patient  Symptoms of low blood sugar? none  Symptoms of high blood sugar? none  Eye exam: up to date  Foot exam: up to date  Diet: a normal lunch for her is a sandwich.   Exercise: likes to walk when it is nice outside.   Aspirin: Taking 81mg daily and denies side effects   Statin: Yes: atorvastatin 40 mg at bedtime.  ACEi/ARB: Yes: lisinopril 40 mg daily.   Urine Albumin:   Lab Results   Component Value Date    UMALCR 173.17 (H) 01/27/2023      Lab Results   Component Value Date    A1C 15.1 01/17/2023    A1C 11.4 10/17/2022    A1C 10.4 07/15/2022    A1C 10.8 04/15/2022    A1C 8.6 01/14/2022    A1C >14.0 05/10/2021    A1C 10.7 02/10/2021    A1C 7.6 11/09/2020    A1C 7.9 08/07/2020    A1C 8.1 05/04/2020     Hypertension: Current medications include lisinopril 40 mg daily, amlodipine 5 mg daily.  Patient does not self-monitor blood pressure.  Patient reports no current medication side effects.  BP Readings from Last 3 Encounters:   03/08/23 122/80   01/17/23 118/70   01/05/23 126/76     Hyperlipidemia: Current therapy includes atorvastatin 40 mg daily.  Patient reports no significant myalgias or other side effects.  The 10-year ASCVD risk score (Demetrice MATUTE, et al., 2019) is: 13.1%    Values used to calculate the score:      Age: 57 years      Sex: Female      Is Non- : No      Diabetic: Yes      Tobacco smoker: Yes      Systolic Blood Pressure: 122 mmHg      Is BP treated: Yes      HDL Cholesterol: 50 mg/dL      Total Cholesterol: 161 mg/dL  Recent Labs   Lab Test 07/15/22  0857 04/15/22  0950   CHOL 161 172   HDL 50 59   LDL 66 83   TRIG 224* 148     Emphysema: Current medications include Symbicort 160-4.5 mcg/actuation 2 puffs into the lungs twice daily, Combivent Respimat   mcg/actuation 1 puff into the lungs four times daily as needed. Patient reports breathing is stable at this time.     Tremor: Current medications include primidone 50 mg daily. Patient finds this effective.     Skin: Current medications include urea 40% topically twice daily, nystatin 691565 unit/gram powder topically three times daily as needed, hydrocortisone 1% cream topically twice daily. Patient reports no issues with these medications.     Pain:  Current medications include acetaminophen 325 mg every 6 hours as needed (as needed about once per week). Patient reports the following side effects:  Denies Side Effects.    Supplements: Current supplements include cholecalciferol 50 mcg (2000 units) daily. Patient reports no issues.     Today's Vitals: There were no vitals taken for this visit.  ----------------  I spent 16 minutes with this patient today. I offer these suggestions for consideration by Amberly Whyte NP. A copy of the visit note was provided to the patient's provider(s).    A summary of these recommendations was mailed to the patient.    John Chaudhary, PharmD  Medication Therapy Management Pharmacist  [facility]  Office phone: [phone]    Telemedicine Visit Details  Type of service:  Telephone visit  Start Time: 1:01 PM  End Time: 1:17 PM     Medication Therapy Recommendations  No medication therapy recommendations to display

## 2023-03-10 NOTE — LETTER
_  Medication List        Prepared on: Mar 10, 2023     Bring your Medication List when you go to the doctor, hospital, or   emergency room. And, share it with your family or caregivers.     Note any changes to how you take your medications.  Cross out medications when you no longer use them.    Medication How I take it Why I use it Prescriber   Acetaminophen (TYLENOL PO) Take 325 mg by mouth every 6 hours as needed  Pain Patient Reported   amLODIPine (NORVASC) 5 MG tablet TAKE 1 TABLET BY MOUTH DAILY Essential Hypertension Amberly Whyte NP   aspirin (ASPIRIN LOW DOSE) 81 MG chewable tablet CHEW 1 TABLET BY MOUTH DAILY. DO NOT SPLIT OR CRUSH Essential Hypertension Amberly Whyte NP   atorvastatin (LIPITOR) 40 MG tablet TAKE 1 TABLET BY MOUTH AT BEDTIME Hyperlipidemia Amberly Whyte NP   budesonide-formoterol (SYMBICORT) 160-4.5 MCG/ACT Inhaler Inhale 2 puffs into the lungs 2 times daily Pulmonary emphysema  Amberly Whyte NP   Cholecalciferol (VITAMIN D3) 50 MCG (2000 UT) CAPS TAKE 1 CAPSULE BY MOUTH DAILY Vitamin D Deficiency Amberly Whyte NP   COMBIVENT RESPIMAT  MCG/ACT inhaler INHALE 1 PUFF INTO THE LUNGS 4 TIMES DAILY Pulmonary emphysema, unspecified emphysema type (H) Amberly Whyte NP   hydrocortisone (CORTAID) 1 % external cream Apply topically 2 times daily External Hemorrhoids Amberly Whyte NP   insulin aspart (NOVOLOG VIAL) 100 UNITS/ML vial To be used with the insulin pump.  TDD: 70 units Type 2 diabetes mellitus with hyperglycemia, with long-term current use of insulin (H) BLAIR Caruso CNP   insulin glargine U-300 (TOUJEO SOLOSTAR) 300 UNIT/ML (1 units dial) pen Inject 20 Units Subcutaneous At Bedtime Type 2 diabetes mellitus with hyperglycemia BLAIR Caruso CNP   JARDIANCE 25 MG TABS tablet TAKE 1 TABLET BY MOUTH DAILY Type 2 Diabetes, HbA1c Goal < 7% (H) Amberly Whyte NP   lisinopril (ZESTRIL) 40 MG tablet TAKE 1 TABLET BY MOUTH DAILY Essential Hypertension Amberly OLIVARES  GRISEL Whyte   nystatin (MYCOSTATIN) 240696 UNIT/GM external powder Apply topically 3 times daily Candidiasis of breast Amberly Whyte NP   primidone (MYSOLINE) 50 MG tablet TAKE 1 TABLET BY MOUTH AT BEDTIME Essential Tremor Amberly Whyte NP   urea (GORDONS UREA) 40 % external ointment Apply topically 2 times daily Callus of Foot Amberly Whyte NP         Add new medications, over-the-counter drugs, herbals, vitamins, or  minerals in the blank rows below.    Medication How I take it Why I use it Prescriber                                      Allergies:      No Known Allergies        Side effects I have had:               Other Information:              My notes and questions:

## 2023-03-10 NOTE — LETTER
March 13, 2023  Marlyausten Cannon  [address]  [place] [address]    Dear Ms. Cannon, [facility] [place] [facility]     Thank you for talking with me on Mar 10, 2023 about your health and medications. As a follow-up to our conversation, I have included two documents:      1. Your Recommended To-Do List has steps you should take to get the best results from your medications.  2. Your Medication List will help you keep track of your medications and how to take them.    If you want to talk about these documents, please call John Chaudhary RPH at phone: [phone], Monday-Friday 8-4:30pm.    I look forward to working with you and your doctors to make sure your medications work well for you.    Sincerely,  John Chaudhary RPH  [facility] Pharmacist, [facility]

## 2023-03-13 NOTE — PATIENT INSTRUCTIONS
"Recommendations from today's MTM visit:                                                    MTM (medication therapy management) is a service provided by a clinical pharmacist designed to help you get the most of out of your medicines.   Today we reviewed what your medicines are for, how to know if they are working, that your medicines are safe and how to make your medicine regimen as easy as possible.      1. Continue to follow-up with diabetic education for management of your insulin pump and blood sugar monitoring.     Follow-up: 6 months, sooner if needed.     It was great speaking with you today.  I value your experience and would be very thankful for your time in providing feedback in our clinic survey. In the next few days, you may receive an email or text message from [employer] with a link to a survey related to your  clinical pharmacist.\"     To schedule another MTM appointment, please call the clinic directly or you may call the MTM scheduling line at [phone] or toll-free at [phone].     My Clinical Pharmacist's contact information:                                                      Please feel free to contact me with any questions or concerns you have.      John Chaudhary, PharmD  Medication Therapy Management Pharmacist  [facility]  Office phone: [phone]   "

## 2023-03-26 DIAGNOSIS — Z53.9 PERSONS ENCOUNTERING HEALTH SERVICES FOR SPECIFIC PROCEDURES, NOT CARRIED OUT: Primary | ICD-10-CM

## 2023-03-28 ENCOUNTER — HOSPITAL ENCOUNTER (OUTPATIENT)
Dept: CARDIOLOGY | Facility: HOSPITAL | Age: 58
Discharge: HOME | End: 2023-03-28
Attending: FAMILY MEDICINE | Admitting: INTERNAL MEDICINE
Payer: COMMERCIAL

## 2023-03-28 DIAGNOSIS — I10 ESSENTIAL HYPERTENSION: ICD-10-CM

## 2023-03-28 DIAGNOSIS — E11.9 TYPE 2 DIABETES, HBA1C GOAL < 7% (H): ICD-10-CM

## 2023-03-28 DIAGNOSIS — J96.21 ACUTE AND CHRONIC RESPIRATORY FAILURE WITH HYPOXIA (H): ICD-10-CM

## 2023-03-28 DIAGNOSIS — G47.33 OSA (OBSTRUCTIVE SLEEP APNEA): ICD-10-CM

## 2023-03-28 LAB — LVEF ECHO: NORMAL

## 2023-03-28 PROCEDURE — 93306 TTE W/DOPPLER COMPLETE: CPT | Mod: 26 | Performed by: INTERNAL MEDICINE

## 2023-03-28 PROCEDURE — 999N000208 ECHOCARDIOGRAM COMPLETE

## 2023-03-28 NOTE — TELEPHONE ENCOUNTER
Jardiance 25 MG tablets    Last Written Prescription Date:  02/01/2023  Last Fill Quantity: 30,   # refills: 1  Last Office Visit: 01/17/2023

## 2023-03-29 ENCOUNTER — ALLIED HEALTH/NURSE VISIT (OUTPATIENT)
Dept: EDUCATION SERVICES | Facility: OTHER | Age: 58
End: 2023-03-29
Attending: NURSE PRACTITIONER
Payer: COMMERCIAL

## 2023-03-29 VITALS
HEIGHT: 64 IN | HEART RATE: 100 BPM | OXYGEN SATURATION: 89 % | WEIGHT: 196.2 LBS | DIASTOLIC BLOOD PRESSURE: 79 MMHG | RESPIRATION RATE: 16 BRPM | SYSTOLIC BLOOD PRESSURE: 126 MMHG | BODY MASS INDEX: 33.49 KG/M2

## 2023-03-29 DIAGNOSIS — E11.65 TYPE 2 DIABETES MELLITUS WITH HYPERGLYCEMIA, WITH LONG-TERM CURRENT USE OF INSULIN (H): ICD-10-CM

## 2023-03-29 DIAGNOSIS — Z79.4 TYPE 2 DIABETES MELLITUS WITH HYPERGLYCEMIA, WITH LONG-TERM CURRENT USE OF INSULIN (H): ICD-10-CM

## 2023-03-29 DIAGNOSIS — F17.200 NICOTINE DEPENDENCE, UNCOMPLICATED, UNSPECIFIED NICOTINE PRODUCT TYPE: Primary | ICD-10-CM

## 2023-03-29 PROCEDURE — 99213 OFFICE O/P EST LOW 20 MIN: CPT | Mod: 25 | Performed by: NURSE PRACTITIONER

## 2023-03-29 PROCEDURE — G0463 HOSPITAL OUTPT CLINIC VISIT: HCPCS

## 2023-03-29 PROCEDURE — 95251 CONT GLUC MNTR ANALYSIS I&R: CPT | Performed by: NURSE PRACTITIONER

## 2023-03-29 RX ORDER — EMPAGLIFLOZIN 25 MG/1
TABLET, FILM COATED ORAL
Qty: 30 TABLET | Refills: 0 | Status: SHIPPED | OUTPATIENT
Start: 2023-03-29 | End: 2023-04-26

## 2023-03-29 ASSESSMENT — PAIN SCALES - GENERAL: PAINLEVEL: NO PAIN (0)

## 2023-03-29 NOTE — PATIENT INSTRUCTIONS
Continue working on healthy eating and moving (start low and slow, work up to 30 min, 5x/week)    BG goals:  Fasting and before meals <130, >70  2 hour after eating <180    We only need 1/2 of these numbers to be within target then your A1c will be within target    Medication changes   Watch for low BGs after insulin pump adjustment    Keep working on entering 50 grams with supper    Follow up   Download when you see Amberly Whyte NP in April   2 months with me    Call me sooner if any problems/concerns and/or questions develop including consistent low BGs <70 or consistent high BGs >200  [phone] (Unit Coordinator)    [phone] (Anna, Nurse)    Smoking:   Try keep working on reducing the amount of cigarettes

## 2023-03-29 NOTE — PROGRESS NOTES
SUBJECTIVE:  Marly Cannon, 57 year old, female presents with the following Chief Complaint(s) with HPI to follow:  Chief Complaint   Patient presents with     Diabetes        Diabetes Follow-up    Patient is checking blood sugars: >4x/day using her personal CGM (Freestyle Bud).  Results:    Date: 3/16 to 3/29/23  Glucose management indicator: n/a    Average glucose: 329 (was 393)    Glucose range  Very low (<54): 0  low (<70): 0  In target range (): 0  High (>180): 18%  Very high (>250): 82%      Symptoms of hypoglycemia (low blood sugar): no    Paresthesias (numbness or burning in feet) or sores: no, no sores    Diabetic eye exam within the last year: UTD    Breakfast eaten regularly: most of the time    Patient counting carbs: Yes    Exercising: depends on the weather          HPI:  Marly's here today for the follow up regarding her Diabetes mellitus, Type 2.    A1c trend:  Lab Results   Component Value Date    A1C 15.1 01/17/2023    A1C 11.4 10/17/2022    A1C 10.4 07/15/2022    A1C 10.8 04/15/2022    A1C 8.6 01/14/2022    A1C >14.0 05/10/2021    A1C 10.7 02/10/2021    A1C 7.6 11/09/2020    A1C 7.9 08/07/2020    A1C 8.1 05/04/2020     Current Diabetes medication:   1. Omnipod dash, using Novolog  2. Jardiance 25 mg daily--not taking  Statin use: yes, simvastatin 20 mg daily  ASA: yes, 81 mg daily    Marly's here a download of her CGM and insulin pump with possible diabetic medication adjustments.   Reports the following:  No issues with the Freestyle Bud   She's wearing her Omnipod Dash--no issues.      Otherwise, denies any new health concerns.     Weight trend   Wt Readings from Last 4 Encounters:   03/29/23 89 kg (196 lb 3.2 oz)   03/08/23 87.4 kg (192 lb 9.6 oz)   03/02/23 68 kg (150 lb)   01/17/23 89.5 kg (197 lb 4.8 oz)       Patient Active Problem List   Diagnosis     Type 2 diabetes, HbA1c goal < 7% (H)     ACP (advance care planning)     Stage 3 chronic kidney disease (H)     Pulmonary  emphysema (H)     Hyperparathyroidism due to renal insufficiency (H)     Vitamin D deficiency     Intellectual disability     Breast fibrocystic disorder     Tobacco abuse     Microalbuminuria due to type 2 diabetes mellitus (H)     H/O colonoscopy     Gordon cardiac risk <10% in next 10 years     Tubular adenoma of colon     Hyperlipidemia, unspecified hyperlipidemia type     Essential hypertension     Callus of foot     Memory disturbance     ADEEL (obstructive sleep apnea)     Tremor     Diabetic polyneuropathy associated with diabetes mellitus due to underlying condition (H)     Urinary incontinence, unspecified type       History reviewed. No pertinent past medical history.    Past Surgical History:   Procedure Laterality Date     BIOPSY BREAST Left 02/14/2008    patient states no bx, Clip in left breast/ stereo bxc 2-- no path in [facility] that far back     COLONOSCOPY N/A 08/20/2015    Procedure: COLONOSCOPY;  Surgeon: Gentry Porter MD;  Location: [facility]     COLONOSCOPY N/A 09/21/2018    Procedure: COLONOSCOPY;  COLONOSCOPY WITH POLYPECTOMY;  Surgeon: Gentry Porter MD;  Location: [facility]       Family History   Problem Relation Age of Onset     Diabetes Mother      Diabetes Father      Hypertension Brother      Diabetes Sister        Social History     Tobacco Use     Smoking status: Every Day     Packs/day: 1.00     Years: 34.00     Pack years: 34.00     Types: Cigarettes     Start date: 1/1/1979     Smokeless tobacco: Never     Tobacco comments:     Declined QP 05/13/2020   Substance Use Topics     Alcohol use: No     Alcohol/week: 0.0 standard drinks       Current Outpatient Medications   Medication Sig Dispense Refill     Acetaminophen (TYLENOL PO) Take 325 mg by mouth every 6 hours as needed        amLODIPine (NORVASC) 5 MG tablet TAKE 1 TABLET BY MOUTH DAILY 90 tablet 0     aspirin (ASPIRIN LOW DOSE) 81 MG chewable tablet CHEW 1 TABLET BY MOUTH DAILY. DO NOT SPLIT OR CRUSH 90 tablet 2      atorvastatin (LIPITOR) 40 MG tablet TAKE 1 TABLET BY MOUTH AT BEDTIME 90 tablet 2     budesonide-formoterol (SYMBICORT) 160-4.5 MCG/ACT Inhaler Inhale 2 puffs into the lungs 2 times daily 1 Inhaler 3     Cholecalciferol (VITAMIN D3) 50 MCG (2000 UT) CAPS TAKE 1 CAPSULE BY MOUTH DAILY 90 capsule 3     COMBIVENT RESPIMAT  MCG/ACT inhaler INHALE 1 PUFF INTO THE LUNGS 4 TIMES DAILY 4 g 3     Continuous Blood Gluc Sensor (FREESTYLE HANK 2 SENSOR) MISC 1 each every 14 days Use 1 sensor every 14 days. Use to read blood sugars per 's instructions. 2 each 5     hydrocortisone (CORTAID) 1 % external cream Apply topically 2 times daily 453.6 g 0     insulin glargine U-300 (TOUJEO SOLOSTAR) 300 UNIT/ML (1 units dial) pen Inject 20 Units Subcutaneous At Bedtime 9 mL 1     insulin pen needle (32G X 4 MM) 32G X 4 MM miscellaneous Use 1 pen needles daily 100 each 3     JARDIANCE 25 MG TABS tablet TAKE 1 TABLET BY MOUTH DAILY 30 tablet 1     lisinopril (ZESTRIL) 40 MG tablet TAKE 1 TABLET BY MOUTH DAILY 90 tablet 0     nystatin (MYCOSTATIN) 515742 UNIT/GM external powder Apply topically 3 times daily 60 g 3     OneTouch Delica Lancets 33G MISC Inject 1 each Subcutaneous 3 times daily (before meals) 100 each 11     ONETOUCH ULTRA test strip USE TO TEST BLOOD SUGAR 4 TIMES DAILY 100 strip 1     order for DME Women briefs 50 each 1     primidone (MYSOLINE) 50 MG tablet TAKE 1 TABLET BY MOUTH AT BEDTIME 30 tablet 11     urea (GORDONS UREA) 40 % external ointment Apply topically 2 times daily 30 g 0     insulin aspart (NOVOLOG VIAL) 100 UNITS/ML vial To be used with the insulin pump.  TDD: 70 units (Patient not taking: Reported on 3/29/2023) 30 mL 3     Insulin Disposable Pump (OMNIPOD DASH PODS, GEN 4,) MISC 1 each every 48 hours (Patient not taking: Reported on 3/8/2023) 15 each 5     INSULIN PUMP - OUTPATIENT Insulin pump: Omnipod Dash  Updated: 11/23/22  Basal: 0.9 (new)  Total basal: 21.6 units  CR: 12  ISF:  "45  BG target: 100  Active insulin: 4 hours (Patient not taking: Reported on 3/29/2023)         No Known Allergies    REVIEW OF SYSTEMS  Skin: negative  Eyes: negative, wears glasses    Ears/Nose/Throat: negative  Respiratory: No shortness of breath or hemoptysis; smoker's cough; hx of sleep apnea  Cardiovascular: negative  Gastrointestinal: negative  Genitourinary: negative  Musculoskeletal: negative; hx of left shoulder pain and left knee--depends on the weather   Neurologic: negative  Psychiatric: negative  Hematologic/Lymphatic/Immunologic: negative  Endocrine: positive for diabetes     OBJECTIVE:  /79   Pulse 100   Resp 16   Ht 1.626 m (5' 4\")   Wt 89 kg (196 lb 3.2 oz)   SpO2 (!) 89%   BMI 33.68 kg/m    Constitutional: alert and no distress  Respiratory:  Good diaphragmatic excursion.   Musculoskeletal: extremities normal- no gross deformities noted and gait normal  Skin: no suspicious lesions or rashes to visible skin  Psychiatric: mentation appears normal and affect normal/bright      LABS  No results found for any visits on 23.    ASSESSMENT / PLAN:  (F17.200) Nicotine dependence, uncomplicated, unspecified nicotine product type  (primary encounter diagnosis)  Comment: noted, encourage her to quit  Plan:   Spoke with patient regarding options for smoking cessation.  Various pharmacologic options and behavioral techniques were reviewed.  The relative effectiveness as well as risks and benefits were reviewed with patient.  Patient is interested in: No interventions    Smoking Cessation    Marly's aware to let us know when she's ready to quit smoking.  Discussed the dangers of smoking with diabetes    (E11.65,  Z79.4) Type 2 diabetes mellitus with hyperglycemia, with long-term current use of insulin (H)  Comment: noted CGM and insulin pump data    Meter download (3/15 to 3/29/23)  B  Highest: 193  Lowest: 137    Plan: GLUCOSE MONITOR, 72 HOUR, PHYS INTERP          Noted meter readings " not matching what was seen on the CGM download    Will adjust her insulin pump accordingly    Insulin pump: Omnipod Dash  Updated: 3/29/23  Basal: 1.1 (new)  Total basal: 25.2 units  CR: 12  ISF: 45  BG target: 100  Active insulin: 4 hours    Keep taking your Jardiance    Education focused on adding a carb bolus before consuming supper     Follow up  4/19: nurse only downlaod  5/24: appt with me    Call sooner if any issues    Patient Instructions   Continue working on healthy eating and moving (start low and slow, work up to 30 min, 5x/week)    BG goals:  Fasting and before meals <130, >70  2 hour after eating <180    We only need 1/2 of these numbers to be within target then your A1c will be within target    Medication changes   Watch for low BGs after insulin pump adjustment    Keep working on entering 50 grams with supper    Follow up   Download when you see Amberly Whyte NP in April   2 months with me    Call me sooner if any problems/concerns and/or questions develop including consistent low BGs <70 or consistent high BGs >200  [phone] (Unit Coordinator)    [phone] (Anna, Nurse)    Smoking:   Try keep working on reducing the amount of cigarettes                 Time: 30 minutes  Barrier: see [facility]  Willingness to learn: accepting    Courtney PINTO [facility]  Diabetes and Wound Care    Cc: Amberly Whyte NP    With the electronic record, we can now more quickly and easily track our patient diabetic goals. Our diabetes clinical review is in progress and these are the indicators we are monitoring for good diabetes health.     1.) HbA1C less than 7 (measurement of your average blood sugars)  2.) Blood Pressure less than 140/80  3.) LDL less than 100 (bad cholesterol)  4.) HbA1C is checked in the last 6 months and below 7% (more frequently if not at goal or adjusting medications)  5.) LDL is checked in the last 12 months (more frequently if not at goal or adjusting medications)  6.) Taking one baby aspirin  daily (unless otherwise instructed)  7.) No tobacco use  8) Statin use     You have achieved 6 out of 8 of these and I am encouraging you to come in and get tuned up to achieve 8 out of 8!  Here is what you have achieved so far in my goals for you:  1.) HbA1C  less than 7:                              NO  Your last  HbA1C :  Lab Results   Component Value Date    A1C 15.1 01/17/2023    A1C 11.4 10/17/2022    A1C 10.4 07/15/2022    A1C 10.8 04/15/2022    A1C 8.6 01/14/2022    A1C >14.0 05/10/2021    A1C 10.7 02/10/2021    A1C 7.6 11/09/2020    A1C 7.9 08/07/2020    A1C 8.1 05/04/2020     2.) Blood Pressure less than 140/80:       YES      Your last                       BP Readings from Last 1 Encounters:   03/29/23 126/79     3.) LDL less than 100:                              YES      Your last     LDL Cholesterol Calculated   Date Value Ref Range Status   07/15/2022 66 <=100 mg/dL Final   11/09/2020 74 <100 mg/dL Final     Comment:     Desirable:       <100 mg/dl       4.) Checked HbA1C in the past 6 months: YES      5.) Checked LDL in the past 12 months:    YES      6.) Taking one aspirin daily:                       YES     7.) No tobacco use:                                        NO   8.) Statin use      YES

## 2023-04-04 NOTE — RESULT ENCOUNTER NOTE
Patient notified of results. Via letter  Chinyere Vazquez LPN   Go for blood tests as directed. Because your dose is based on the PT/INR blood test, it is very important that you get your blood tested on the scheduled date and time and to keep your health care provider appointments.   Please follow up with your doctor within 3 days of discharge to schedule your next blood test.

## 2023-04-17 NOTE — PROGRESS NOTES
"  Assessment & Plan     Type 2 diabetes, HbA1c goal < 7% (H)  Hemoglobin A1c pending. Will notify patient of the results when available and intervene accordingly. We did have a long talk regarding her uncontrolled diabetes. She is willing to let home care nurse give once weekly injection. On statin. BP at goal. Eye exam scheduled. Will see her back in 3 months, sooner with new or worsening symptoms.     Microalbuminuria due to type 2 diabetes mellitus (H)  On ACE.     Essential hypertension  Well controlled. Continue current medications. Encouraged daily exercise and a low sodium diet. Recommended checking BP's 2x/wk, call the clinic if consistantly s>140 or d>90. Follow up in 3 months.     Hyperlipidemia, unspecified hyperlipidemia type  Tolerating statin. Lipids controlled in 7/2022. Will recheck her CMP today. Will notify patient of the results when available and intervene accordingly.     Stage 3 chronic kidney disease, unspecified whether stage 3a or 3b CKD (H)  Will recheck kidney function today. Will notify patient of the results when available and intervene accordingly. Will continue to avoid ibuprofen.     Hyperparathyroidism due to renal insufficiency (H)  Recent Parathyroid Hormone Intact normal.     Tobacco abuse  Cessation encouraged.     Elevated hemoglobin (H)  Will recheck a hgb today. Likely from COPD. Smoking cessation encouraged. Also may have sleep apnea. Sleep study scheduled. JAK2 mutation was negative.     6}     BMI:   Estimated body mass index is 34.04 kg/m  as calculated from the following:    Height as of 3/29/23: 1.626 m (5' 4\").    Weight as of this encounter: 90 kg (198 lb 5 oz).   Weight management plan: Discussed healthy diet and exercise guidelines    Amberly Whyte NP  [facility]   Marly is a 57 year old, presenting for the following health issues:  Kidney Problem, Hypertension, Diabetes, Lipids, and COPD    HPI     Diabetes Follow-up    How " often are you checking your blood sugar? Continuous meter, typically around 100  Blood sugar testing frequency justification:  Uncontrolled diabetes  What time of day are you checking your blood sugars (select all that apply)?  Before and after meals and At bedtime  Have you had any blood sugars above 200?  No  Have you had any blood sugars below 70?  No    What symptoms do you notice when your blood sugar is low?  None    What concerns do you have today about your diabetes? None     Do you have any of these symptoms? (Select all that apply)  No numbness or tingling in feet.  No redness, sores or blisters on feet.  No complaints of excessive thirst.  No reports of blurry vision.  No significant changes to weight.    Have you had a diabetic eye exam in the last 12 months? No, scheduled next month    Follows with the diabetic center. Last visit with them on 3/29/23. Note was reviewed. Average glucose was 393    Taking Toujeo (insulin pump) and Jardiance without side effects. Metformin caused an upset stomach. Is often noncomplaint with her medications. Does not take as prescribed.     On asa.     She denies chest pain, dizziness, syncope, or palpitations. Some chronic shortness of breath related to her COPD. Feels this has continued to improved. Denies any recent shortness of breath.     Working on weight loss. Trying to walk more when the weather is nicer    On ACE.     She does have chronic kidney disease. Typically avoids NSAIDs.     No abdominal pain. No nausea or vomiting.     Due for several vaccines. declines      Hyperlipidemia Follow-Up      Are you regularly taking any medication or supplement to lower your cholesterol?   Yes- Lipitor    Are you having muscle aches or other side effects that you think could be caused by your cholesterol lowering medication?  No     She denies chest pain, dizziness, syncope, or palpitations. Some chronic shortness of breath related to her COPD. Feels this has improved. Denies  any recent shortness of breath.     Hypertension Follow-up      Do you check your blood pressure regularly outside of the clinic? Yes     Are you following a low salt diet? No    Are your blood pressures ever more than 140 on the top number (systolic) OR more   than 90 on the bottom number (diastolic), for example 140/90? No   Taking lisinopril 40 mg with amlodipine 5 mg. No side effects.     Elevated Hgb; hgb was 17.1 on 1/17/23. Taking an ASA daily. Most likely from COPD and ADEEL. JAK2 mutation was negative.     BP Readings from Last 2 Encounters:   04/19/23 124/76   03/29/23 126/79     Hemoglobin A1C (%)   Date Value   01/17/2023 15.1 (H)   10/17/2022 11.4 (H)   05/10/2021 >14.0 (H)   02/10/2021 10.7 (H)     LDL Cholesterol Calculated (mg/dL)   Date Value   07/15/2022 66   04/15/2022 83   11/09/2020 74   02/04/2020 49       COPD Follow-Up    Overall, how are your COPD symptoms since your last clinic visit?  Better    How much fatigue or shortness of breath do you have when you are walking?  Same as usual    How much shortness of breath do you have when you are resting?  None    How often do you cough? Sometimes    Have you noticed any change in your sputum/phlegm?  No    Have you experienced a recent fever? No    Please describe how far you can walk without stopping to rest:  Less than a mile    How many flights of stairs are you able to walk up without stopping?  3 or more    Have you had any Emergency Room Visits, Urgent Care Visits, or Hospital Admissions because of your COPD since your last office visit?  No     Continues to smoke 1 ppd. No interest in quitting.     Using Combivent 4 times daily.     History   Smoking Status     Every Day     Packs/day: 1.00     Years: 34.00     Types: Cigarettes     Start date: 1/1/1979   Smokeless Tobacco     Never     No results found for: FEV1, ABC3BLY    Chronic Kidney Disease Follow-up      Do you take any over the counter pain medicine?: No    Review of Systems    Constitutional, HEENT, cardiovascular, pulmonary, gi and gu systems are negative, except as otherwise noted.      Objective    /76 (BP Location: Right arm, Patient Position: Chair, Cuff Size: Adult Large)   Pulse 89   Temp 98.9  F (37.2  C) (Tympanic)   Resp 18   Wt 90 kg (198 lb 5 oz)   SpO2 92%   BMI 34.04 kg/m    Body mass index is 34.04 kg/m .  Physical Exam   GENERAL: alert, no distress and obese  EYES: Eyes grossly normal to inspection, PERRL and conjunctivae and sclerae normal  HENT: ear canals and TM's normal, nose and mouth without ulcers or lesions  NECK: no adenopathy, no asymmetry, masses, or scars and thyroid normal to palpation  RESP: lungs clear to auscultation - no rales, rhonchi or wheezes  CV: regular rate and rhythm, no murmur, click or rub, no peripheral edema  ABDOMEN: soft, nontender, obese, no masses and bowel sounds normal  PSYCH: mentation appears normal, affect normal/bright  Diabetic foot exam: Diabetic foot exam: no trophic changes or ulcerative lesions, DP and PT reduced bilaterally and reduced sensation for entire monofilament exam, second and third toe on each foot webbed    Labs in process

## 2023-04-19 ENCOUNTER — OFFICE VISIT (OUTPATIENT)
Dept: FAMILY MEDICINE | Facility: OTHER | Age: 58
End: 2023-04-19
Attending: NURSE PRACTITIONER
Payer: COMMERCIAL

## 2023-04-19 VITALS
BODY MASS INDEX: 34.04 KG/M2 | HEART RATE: 89 BPM | DIASTOLIC BLOOD PRESSURE: 76 MMHG | SYSTOLIC BLOOD PRESSURE: 124 MMHG | WEIGHT: 198.31 LBS | OXYGEN SATURATION: 92 % | RESPIRATION RATE: 18 BRPM | TEMPERATURE: 98.9 F

## 2023-04-19 DIAGNOSIS — Z72.0 TOBACCO ABUSE: ICD-10-CM

## 2023-04-19 DIAGNOSIS — E11.9 TYPE 2 DIABETES, HBA1C GOAL < 7% (H): Primary | ICD-10-CM

## 2023-04-19 DIAGNOSIS — I10 ESSENTIAL HYPERTENSION: ICD-10-CM

## 2023-04-19 DIAGNOSIS — D58.2 ELEVATED HEMOGLOBIN (H): ICD-10-CM

## 2023-04-19 DIAGNOSIS — R80.9 MICROALBUMINURIA DUE TO TYPE 2 DIABETES MELLITUS (H): ICD-10-CM

## 2023-04-19 DIAGNOSIS — N25.81 HYPERPARATHYROIDISM DUE TO RENAL INSUFFICIENCY (H): ICD-10-CM

## 2023-04-19 DIAGNOSIS — E11.29 MICROALBUMINURIA DUE TO TYPE 2 DIABETES MELLITUS (H): ICD-10-CM

## 2023-04-19 DIAGNOSIS — N18.30 STAGE 3 CHRONIC KIDNEY DISEASE, UNSPECIFIED WHETHER STAGE 3A OR 3B CKD (H): ICD-10-CM

## 2023-04-19 DIAGNOSIS — E78.5 HYPERLIPIDEMIA, UNSPECIFIED HYPERLIPIDEMIA TYPE: ICD-10-CM

## 2023-04-19 LAB
ALBUMIN SERPL BCG-MCNC: 4 G/DL (ref 3.5–5.2)
ALP SERPL-CCNC: 97 U/L (ref 35–104)
ALT SERPL W P-5'-P-CCNC: 11 U/L (ref 10–35)
ANION GAP SERPL CALCULATED.3IONS-SCNC: 10 MMOL/L (ref 7–15)
AST SERPL W P-5'-P-CCNC: 14 U/L (ref 10–35)
BASOPHILS # BLD AUTO: 0.1 10E3/UL (ref 0–0.2)
BASOPHILS NFR BLD AUTO: 1 %
BILIRUB SERPL-MCNC: 0.5 MG/DL
BUN SERPL-MCNC: 23.3 MG/DL (ref 8–23)
CALCIUM SERPL-MCNC: 9.9 MG/DL (ref 8.6–10)
CHLORIDE SERPL-SCNC: 93 MMOL/L (ref 98–107)
CREAT SERPL-MCNC: 1.16 MG/DL (ref 0.51–0.95)
DEPRECATED HCO3 PLAS-SCNC: 29 MMOL/L (ref 22–29)
EOSINOPHIL # BLD AUTO: 0.1 10E3/UL (ref 0–0.7)
EOSINOPHIL NFR BLD AUTO: 1 %
ERYTHROCYTE [DISTWIDTH] IN BLOOD BY AUTOMATED COUNT: 12.5 % (ref 10–15)
EST. AVERAGE GLUCOSE BLD GHB EST-MCNC: 387 MG/DL
GFR SERPL CREATININE-BSD FRML MDRD: 54 ML/MIN/1.73M2
GLUCOSE SERPL-MCNC: 491 MG/DL (ref 70–99)
HBA1C MFR BLD: 15.1 %
HCT VFR BLD AUTO: 49.3 % (ref 35–47)
HGB BLD-MCNC: 16.7 G/DL (ref 11.7–15.7)
IMM GRANULOCYTES # BLD: 0 10E3/UL
IMM GRANULOCYTES NFR BLD: 0 %
LYMPHOCYTES # BLD AUTO: 2.7 10E3/UL (ref 0.8–5.3)
LYMPHOCYTES NFR BLD AUTO: 28 %
MCH RBC QN AUTO: 30.5 PG (ref 26.5–33)
MCHC RBC AUTO-ENTMCNC: 33.9 G/DL (ref 31.5–36.5)
MCV RBC AUTO: 90 FL (ref 78–100)
MONOCYTES # BLD AUTO: 0.6 10E3/UL (ref 0–1.3)
MONOCYTES NFR BLD AUTO: 6 %
NEUTROPHILS # BLD AUTO: 6.1 10E3/UL (ref 1.6–8.3)
NEUTROPHILS NFR BLD AUTO: 64 %
NRBC # BLD AUTO: 0 10E3/UL
NRBC BLD AUTO-RTO: 0 /100
PLATELET # BLD AUTO: 286 10E3/UL (ref 150–450)
POTASSIUM SERPL-SCNC: 4.4 MMOL/L (ref 3.4–5.3)
PROT SERPL-MCNC: 7.7 G/DL (ref 6.4–8.3)
RBC # BLD AUTO: 5.47 10E6/UL (ref 3.8–5.2)
SODIUM SERPL-SCNC: 132 MMOL/L (ref 136–145)
TSH SERPL DL<=0.005 MIU/L-ACNC: 1.03 UIU/ML (ref 0.3–4.2)
WBC # BLD AUTO: 9.6 10E3/UL (ref 4–11)

## 2023-04-19 PROCEDURE — 84443 ASSAY THYROID STIM HORMONE: CPT | Mod: ZL | Performed by: NURSE PRACTITIONER

## 2023-04-19 PROCEDURE — G0463 HOSPITAL OUTPT CLINIC VISIT: HCPCS | Mod: 25

## 2023-04-19 PROCEDURE — G0463 HOSPITAL OUTPT CLINIC VISIT: HCPCS

## 2023-04-19 PROCEDURE — 80053 COMPREHEN METABOLIC PANEL: CPT | Mod: ZL | Performed by: NURSE PRACTITIONER

## 2023-04-19 PROCEDURE — 99214 OFFICE O/P EST MOD 30 MIN: CPT | Performed by: NURSE PRACTITIONER

## 2023-04-19 PROCEDURE — 85025 COMPLETE CBC W/AUTO DIFF WBC: CPT | Mod: ZL | Performed by: NURSE PRACTITIONER

## 2023-04-19 PROCEDURE — 83036 HEMOGLOBIN GLYCOSYLATED A1C: CPT | Mod: ZL | Performed by: NURSE PRACTITIONER

## 2023-04-19 PROCEDURE — 36415 COLL VENOUS BLD VENIPUNCTURE: CPT | Mod: ZL | Performed by: NURSE PRACTITIONER

## 2023-04-19 ASSESSMENT — PAIN SCALES - GENERAL: PAINLEVEL: NO PAIN (0)

## 2023-04-25 DIAGNOSIS — E11.9 TYPE 2 DIABETES, HBA1C GOAL < 7% (H): ICD-10-CM

## 2023-04-25 NOTE — TELEPHONE ENCOUNTER
Jardiance 25 MG tablets    Last Written Prescription Date:  03/29/2023  Last Fill Quantity: 30,   # refills: 0  Last Office Visit: 04/19/2023

## 2023-04-26 RX ORDER — EMPAGLIFLOZIN 25 MG/1
TABLET, FILM COATED ORAL
Qty: 30 TABLET | Refills: 2 | Status: SHIPPED | OUTPATIENT
Start: 2023-04-26 | End: 2023-07-26

## 2023-05-08 ENCOUNTER — TRANSFERRED RECORDS (OUTPATIENT)
Dept: HEALTH INFORMATION MANAGEMENT | Facility: CLINIC | Age: 58
End: 2023-05-08

## 2023-05-08 LAB — RETINOPATHY: NEGATIVE

## 2023-05-15 NOTE — TELEPHONE ENCOUNTER
Pt called questioning what device she was supposed to discontinue, the one on her arm or the one on her side. I spoke to Courtney Chaudhary and was told that the pt told her that she has not used the Omnipod all summer, so she can stop that and swith to insulin pen Rx'd. I notified the pt she states she has been using the Omnipod and is confused. Please advise.   RE: Marco Maguire  : 1988  MRN: [id number]  FRANK: 05/15/23    RHEUMATOLOGIC PROBLEM LIST:  Psoriatic arthropathy  Long-term high-risk medication management    CURRENT RHEUMATOLOGIC THERAPIES:  Enbrel 50 mg subcutaneous weekly: no side-effects noted previously. This requires bi-annual blood work to look for bone-marrow suppression, as well as renal and liver function. Complications in these areas from TNFi are infrequent.    PRIOR RHEUMATOLOGIC THERAPIES:  Topical glucocorticoids  He has not required systemic glucocorticoids in the past    ASSESSMENT:  1. Psoriatic arthropathy: joint disease is well-controlled with TNFi monotherapy. He has incomplete resolution of his skin disease, but given the mild severity, should he desire, this could be treated with topical therapies, as previously prescribed by Dr. Mendieta.  2. Long-term high-risk medication management: repeat CBC and CMP today    PLAN:  1. Continue current medications at current dosages and intervals. He will call us if he changes his mind.  2. CBC and CMP today  3. RTC 6 months, sooner if issues    INTERVAL HISTORY:  Doing well overall. Notes that he is in the process of getting [facility]-required physical examinations and they sent him a letter inquiring about the state of his PsA. He denies any flares of arthritis. He notes that he continues to have mild plaques in the same areas as previous examination.     OBJECTIVE:  Psoriasis lesions noted on the bilateral eyebrows, bridge of the nose and around the nasolabial folds bilaterally (facial rash also possibly seborrheic dermatitis), bilateral elbows, scattered small lesions on the dorsum of the hands bilaterally.No synovitis of the upper and lower extremities including hands, wrists, elbows, knees, ankles or feet bilaterally. Range of motion to flexion, extension, rotation of the neck is normal.    Joe Magana MD    CC: Dr. Middleton, [facility] physical-related information

## 2023-05-23 DIAGNOSIS — G25.0 ESSENTIAL TREMOR: ICD-10-CM

## 2023-05-25 DIAGNOSIS — Z53.9 PERSONS ENCOUNTERING HEALTH SERVICES FOR SPECIFIC PROCEDURES, NOT CARRIED OUT: Primary | ICD-10-CM

## 2023-05-25 PROCEDURE — G0179 MD RECERTIFICATION HHA PT: HCPCS | Performed by: NURSE PRACTITIONER

## 2023-05-25 RX ORDER — PRIMIDONE 50 MG/1
TABLET ORAL
Qty: 30 TABLET | Refills: 0 | Status: SHIPPED | OUTPATIENT
Start: 2023-05-25 | End: 2023-06-21

## 2023-05-25 NOTE — TELEPHONE ENCOUNTER
Mysoline      Last Written Prescription Date:  4.25.23  Last Fill Quantity: #30,   # refills: 0  Last Office Visit: 4.19.23  Future Office visit:    Next 5 appointments (look out 90 days)    Jul 19, 2023  2:00 PM  (Arrive by 1:45 PM)  SHORT with Amberly Whyte NP  [facility] ([facility] [place] ) [address]  [place] [address]  [phone]           Routing refill request to provider for review/approval because:  Drug not on the [facility], [facility] or [facility] refill protocol or controlled substance

## 2023-05-30 ENCOUNTER — HOSPITAL ENCOUNTER (OUTPATIENT)
Dept: RESPIRATORY THERAPY | Facility: HOSPITAL | Age: 58
Discharge: HOME | End: 2023-05-30
Attending: FAMILY MEDICINE | Admitting: INTERNAL MEDICINE
Payer: COMMERCIAL

## 2023-05-30 DIAGNOSIS — J96.21 ACUTE AND CHRONIC RESPIRATORY FAILURE WITH HYPOXIA (H): ICD-10-CM

## 2023-05-30 DIAGNOSIS — G47.33 OSA (OBSTRUCTIVE SLEEP APNEA): ICD-10-CM

## 2023-05-30 DIAGNOSIS — I10 ESSENTIAL HYPERTENSION: ICD-10-CM

## 2023-05-30 LAB — HGB BLD-MCNC: 16.5 G/DL (ref 11.7–15.7)

## 2023-05-30 PROCEDURE — 36415 COLL VENOUS BLD VENIPUNCTURE: CPT | Performed by: FAMILY MEDICINE

## 2023-05-30 PROCEDURE — 94060 EVALUATION OF WHEEZING: CPT | Mod: 26 | Performed by: INTERNAL MEDICINE

## 2023-05-30 PROCEDURE — 94729 DIFFUSING CAPACITY: CPT | Mod: 26 | Performed by: INTERNAL MEDICINE

## 2023-05-30 PROCEDURE — 94060 EVALUATION OF WHEEZING: CPT

## 2023-05-30 PROCEDURE — 94726 PLETHYSMOGRAPHY LUNG VOLUMES: CPT | Mod: 26 | Performed by: INTERNAL MEDICINE

## 2023-05-30 PROCEDURE — 94729 DIFFUSING CAPACITY: CPT

## 2023-05-30 PROCEDURE — 250N000009 HC RX 250: Performed by: FAMILY MEDICINE

## 2023-05-30 PROCEDURE — 85018 HEMOGLOBIN: CPT | Performed by: FAMILY MEDICINE

## 2023-05-30 PROCEDURE — 94726 PLETHYSMOGRAPHY LUNG VOLUMES: CPT

## 2023-05-30 RX ORDER — ALBUTEROL SULFATE 0.83 MG/ML
2.5 SOLUTION RESPIRATORY (INHALATION)
Status: COMPLETED | OUTPATIENT
Start: 2023-05-30 | End: 2023-05-30

## 2023-05-30 RX ADMIN — ALBUTEROL SULFATE 2.5 MG: 2.5 SOLUTION RESPIRATORY (INHALATION) at 14:25

## 2023-06-01 RX ORDER — AMLODIPINE BESYLATE 5 MG/1
TABLET ORAL
Qty: 90 TABLET | Refills: 0 | Status: SHIPPED | OUTPATIENT
Start: 2023-06-01 | End: 2023-08-30

## 2023-06-01 RX ORDER — LISINOPRIL 40 MG/1
TABLET ORAL
Qty: 90 TABLET | Refills: 0 | Status: SHIPPED | OUTPATIENT
Start: 2023-06-01 | End: 2023-08-30

## 2023-06-01 NOTE — TELEPHONE ENCOUNTER
Amlodipine      Last Written Prescription Date:  3/1/23  Last Fill Quantity: 90,   # refills: 0  Last Office Visit: 4/19/23  Future Office visit:    Next 5 appointments (look out 90 days)    Jul 19, 2023  2:00 PM  (Arrive by 1:45 PM)  SHORT with Amberly Whyte NP  [facility] [place] ([facility] [place] ) [address]  [place] [address]  [phone]         Lisinopril      Last Written Prescription Date:  2/28/23  Last Fill Quantity: 90,   # refills: 0  Last Office Visit: 4/19/23  Future Office visit:    Next 5 appointments (look out 90 days)    Jul 19, 2023  2:00 PM  (Arrive by 1:45 PM)  SHORT with Amberly Whyte NP  [facility] [place] ([facility] [place] ) [address]  [place] [address]  [phone]

## 2023-06-02 DIAGNOSIS — Z53.9 PERSONS ENCOUNTERING HEALTH SERVICES FOR SPECIFIC PROCEDURES, NOT CARRIED OUT: Primary | ICD-10-CM

## 2023-06-02 PROCEDURE — G0179 MD RECERTIFICATION HHA PT: HCPCS | Performed by: NURSE PRACTITIONER

## 2023-06-06 DIAGNOSIS — I10 ESSENTIAL HYPERTENSION: ICD-10-CM

## 2023-06-08 NOTE — TELEPHONE ENCOUNTER
Aspirin (Aspirin Low Dose) 81 MG chewable tablet    Last Written Prescription Date:  08/31/2022  Last Fill Quantity: 90,   # refills: 0  Last Office Visit: 04/19/2023

## 2023-06-09 RX ORDER — ASPIRIN 81 MG/1
TABLET, CHEWABLE ORAL
Qty: 90 TABLET | Refills: 2 | Status: SHIPPED | OUTPATIENT
Start: 2023-06-09 | End: 2024-03-05

## 2023-06-20 ENCOUNTER — LAB (OUTPATIENT)
Dept: LAB | Facility: OTHER | Age: 58
End: 2023-06-20
Payer: COMMERCIAL

## 2023-06-20 ENCOUNTER — TELEPHONE (OUTPATIENT)
Dept: FAMILY MEDICINE | Facility: OTHER | Age: 58
End: 2023-06-20

## 2023-06-20 DIAGNOSIS — E87.1 HYPONATREMIA: ICD-10-CM

## 2023-06-20 LAB
ANION GAP SERPL CALCULATED.3IONS-SCNC: 14 MMOL/L (ref 7–15)
BUN SERPL-MCNC: 25.2 MG/DL (ref 8–23)
CALCIUM SERPL-MCNC: 9.3 MG/DL (ref 8.6–10)
CHLORIDE SERPL-SCNC: 97 MMOL/L (ref 98–107)
CREAT SERPL-MCNC: 1.25 MG/DL (ref 0.51–0.95)
DEPRECATED HCO3 PLAS-SCNC: 24 MMOL/L (ref 22–29)
GFR SERPL CREATININE-BSD FRML MDRD: 49 ML/MIN/1.73M2
GLUCOSE SERPL-MCNC: 473 MG/DL (ref 70–99)
POTASSIUM SERPL-SCNC: 4.4 MMOL/L (ref 3.4–5.3)
SODIUM SERPL-SCNC: 135 MMOL/L (ref 136–145)

## 2023-06-20 PROCEDURE — 80048 BASIC METABOLIC PNL TOTAL CA: CPT | Mod: ZL

## 2023-06-20 PROCEDURE — 36415 COLL VENOUS BLD VENIPUNCTURE: CPT | Mod: ZL

## 2023-06-20 NOTE — TELEPHONE ENCOUNTER
----- Message from Amberly Whyte NP sent at 6/20/2023  2:50 PM CDT -----  Notify patient. Kidney function is decreased, but stable.   Sodium trending up.   Needs to see the diabetic center to get better control of her diabetes.   Amberly Whyte NP

## 2023-06-23 ENCOUNTER — TELEPHONE (OUTPATIENT)
Dept: FAMILY MEDICINE | Facility: OTHER | Age: 58
End: 2023-06-23

## 2023-06-25 DIAGNOSIS — Z87.891 PERSONAL HISTORY OF TOBACCO USE: Primary | ICD-10-CM

## 2023-06-25 NOTE — PROGRESS NOTES
Lung Cancer Screening Shared Decision Making Visit     Marly Cannon, a 57 year old female, is eligible for lung cancer screening    History   Smoking Status     Every Day     Packs/day: 1.00     Years: 34.00     Types: Cigarettes     Start date: 1/1/1979   Smokeless Tobacco     Never       I have discussed with patient the risks and benefits of screening for lung cancer with low-dose CT.     The risks include:    radiation exposure: one low dose chest CT has as much ionizing radiation as about 15 chest x-rays, or 6 months of background radiation living in [place]      false positives: most findings/nodules are NOT cancer, but some might still require additional diagnostic evaluation, including biopsy    over-diagnosis: some slow growing cancers that might never have been clinically significant will be detected and treated unnecessarily     The benefit of early detection of lung cancer is contingent upon adherence to annual screening or more frequent follow up if indicated.     Furthermore, to benefit from screening, Marly must be willing and able to undergo diagnostic procedures, if indicated. Although no specific guide is available for determining severity of comorbidities, it is reasonable to withhold screening in patients who have greater mortality risk from other diseases.     We did discuss that the best way to prevent lung cancer is to not smoke.    Some patients may value a numeric estimation of lung cancer risk when evaluating if lung cancer screening is right for them, here is one calculator:    ShouldIScreen

## 2023-06-25 NOTE — PATIENT INSTRUCTIONS
Lung Cancer Screening   Frequently Asked Questions  If you are at high-risk for lung cancer, getting screened with low-dose computed tomography (LDCT) every year can help save your life. This handout offers answers to some of the most common questions about lung cancer screening. If you have other questions, please call [phone] ([phone]).     What is it?  Lung cancer screening uses special X-ray technology to create an image of your lung tissue. The exam is quick and easy and takes less than 10 seconds. We don t give you any medicine or use any needles. You can eat before and after the exam. You don t need to change your clothes as long as the clothing on your chest doesn t contain metal. But, you do need to be able to hold your breath for at least 6 seconds during the exam.    What is the goal of lung cancer screening?  The goal of lung cancer screening is to save lives. Many times, lung cancer is not found until a person starts having physical symptoms. Lung cancer screening can help detect lung cancer in the earliest stages when it may be easier to treat.    Who should be screened for lung cancer?  We suggest lung cancer screening for anyone who is at high-risk for lung cancer. You are in the high-risk group if you:      are between the ages of 55 and 79, and    have smoked at least 1 pack of cigarettes a day for 20 or more years, and    still smoke or have quit within the past 15 years.    However, if you have a new cough or shortness of breath, you should talk to your doctor before being screened.    Why does it matter if I have symptoms?  Certain symptoms can be a sign that you have a condition in your lungs that should be checked and treated by your doctor. These symptoms include fever, chest pain, a new or changing cough, shortness of breath that you have never felt before, coughing up blood or unexplained weight loss. Having any of these symptoms can greatly affect the results of lung  cancer screening.       Should all smokers get an LDCT lung cancer screening exam?  It depends. Lung cancer screening is for a very specific group of men and women who have a history of heavy smoking over a long period of time (see  Who should be screened for lung cancer  above).  I am in the high-risk group, but have been diagnosed with cancer in the past. Is LDCT lung cancer screening right for me?  In some cases, you should not have LDCT lung screening, such as when your doctor is already following your cancer with CT scan studies. Your doctor will help you decide if LDCT lung screening is right for you.  Do I need to have a screening exam every year?  Yes. If you are in the high-risk group described earlier, you should get an LDCT lung cancer screening exam every year until you are 79, or are no longer willing or able to undergo screening and possible procedures to diagnose and treat lung cancer.  How effective is LDCT at preventing death from lung cancer?  Studies have shown that LDCT lung cancer screening can lower the risk of death from lung cancer by 20 percent in people who are at high-risk.  What are the risks?  There are some risks and limitations of LDCT lung cancer screening. We want to make sure you understand the risks and benefits, so please let us know if you have any questions. Your doctor may want to talk with you more about these risks.    Radiation exposure: As with any exam that uses radiation, there is a very small increased risk of cancer. The amount of radiation in LDCT is small--about the same amount a person would get from a mammogram. Your doctor orders the exam when he or she feels the potential benefits outweigh the risks.    False negatives: No test is perfect, including LDCT. It is possible that you may have a medical condition, including lung cancer, that is not found during your exam. This is called a false negative result.    False positives and more testing: LDCT very often finds  something in the lung that could be cancer, but in fact is not. This is called a false positive result. False positive tests often cause anxiety. To make sure these findings are not cancer, you may need to have more tests. These tests will be done only if you give us permission. Sometimes patients need a treatment that can have side effects, such as a biopsy. For more information on false positives, see  What can I expect from the results?     Findings not related to lung cancer: Your LDCT exam also takes pictures of areas of your body next to your lungs. In a very small number of cases, the CT scan will show an abnormal finding in one of these areas, such as your kidneys, adrenal glands, liver or thyroid. This finding may not be serious, but you may need more tests. Your doctor can help you decide what other tests you may need, if any.  What can I expect from the results?  About 1 out of 4 LDCT exams will find something that may need more tests. Most of the time, these findings are lung nodules. Lung nodules are very small collections of tissue in the lung. These nodules are very common, and the vast majority--more than 97 percent--are not cancer (benign). Most are normal lymph nodes or small areas of scarring from past infections.  But, if a small lung nodule is found to be cancer, the cancer can be cured more than 90 percent of the time. To know if the nodule is cancer, we may need to get more images before your next yearly screening exam. If the nodule has suspicious features (for example, it is large, has an odd shape or grows over time), we will refer you to a specialist for further testing.  Will my doctor also get the results?  Yes. Your doctor will get a copy of your results.  Is it okay to keep smoking now that there s a cancer screening exam?  No. Tobacco is one of the strongest cancer-causing agents. It causes not only lung cancer, but other cancers and cardiovascular (heart) diseases as well. The damage  caused by smoking builds over time. This means that the longer you smoke, the higher your risk of disease. While it is never too late to quit, the sooner you quit, the better.  Where can I find help to quit smoking?  The best way to prevent lung cancer is to stop smoking. If you have already quit smoking, congratulations and keep it up! For help on quitting smoking, please call [employer] at 3-844-QUITNOW ([phone]) or the [employer] at 1-385.737.7108 to find local resources near you.  One-on-one health coaching:  If you d prefer to work individually with a health care provider on tobacco cessation, we offer:      Medication Therapy Management:  Our specially trained pharmacists work closely with you and your doctor to help you quit smoking.  Call [phone] or [phone] (toll free).

## 2023-07-05 ENCOUNTER — ALLIED HEALTH/NURSE VISIT (OUTPATIENT)
Dept: EDUCATION SERVICES | Facility: OTHER | Age: 58
End: 2023-07-05
Attending: NURSE PRACTITIONER
Payer: COMMERCIAL

## 2023-07-05 VITALS
SYSTOLIC BLOOD PRESSURE: 104 MMHG | DIASTOLIC BLOOD PRESSURE: 72 MMHG | HEART RATE: 103 BPM | RESPIRATION RATE: 16 BRPM | HEIGHT: 64 IN | WEIGHT: 197.8 LBS | OXYGEN SATURATION: 91 % | BODY MASS INDEX: 33.77 KG/M2

## 2023-07-05 DIAGNOSIS — Z79.4 TYPE 2 DIABETES MELLITUS WITH HYPERGLYCEMIA, WITH LONG-TERM CURRENT USE OF INSULIN (H): ICD-10-CM

## 2023-07-05 DIAGNOSIS — F17.200 NICOTINE DEPENDENCE, UNCOMPLICATED, UNSPECIFIED NICOTINE PRODUCT TYPE: Primary | ICD-10-CM

## 2023-07-05 DIAGNOSIS — E11.65 TYPE 2 DIABETES MELLITUS WITH HYPERGLYCEMIA, WITH LONG-TERM CURRENT USE OF INSULIN (H): ICD-10-CM

## 2023-07-05 PROCEDURE — 99213 OFFICE O/P EST LOW 20 MIN: CPT | Performed by: NURSE PRACTITIONER

## 2023-07-05 PROCEDURE — 95251 CONT GLUC MNTR ANALYSIS I&R: CPT | Performed by: NURSE PRACTITIONER

## 2023-07-05 PROCEDURE — G0463 HOSPITAL OUTPT CLINIC VISIT: HCPCS

## 2023-07-05 RX ORDER — INSULIN ASPART 100 [IU]/ML
INJECTION, SOLUTION INTRAVENOUS; SUBCUTANEOUS
Qty: 30 ML | Refills: 3 | Status: SHIPPED | OUTPATIENT
Start: 2023-07-05 | End: 2023-10-05

## 2023-07-05 RX ORDER — INSULIN PUMP CONTROLLER
1 EACH MISCELLANEOUS
Qty: 15 EACH | Refills: 5 | Status: SHIPPED | OUTPATIENT
Start: 2023-07-05 | End: 2024-04-18

## 2023-07-05 ASSESSMENT — PAIN SCALES - GENERAL: PAINLEVEL: NO PAIN (0)

## 2023-07-05 NOTE — PROGRESS NOTES
SUBJECTIVE:  Marly Cannon, 57 year old, female presents with the following Chief Complaint(s) with HPI to follow:  Chief Complaint   Patient presents with     Diabetes        Diabetes Follow-up    Patient is checking blood sugars: >4x/day using her personal CGM (TapHomestyle Bud).  Results:    Date: 6/22 to 7/5/23  Glucose management indicator: 10.1%    Average glucose: 284    Glucose range  Very low (<54): 0  low (<70): 0  In target range (): 4%  High (>180): 39%  Very high (>250): 57%      Symptoms of hypoglycemia (low blood sugar): no    Paresthesias (numbness or burning in feet) or sores: no, no sores    Diabetic eye exam within the last year: UTD    Breakfast eaten regularly: most of the time    Patient counting carbs: Yes    Exercising: depends on the weather          HPI:  Marly's here today for the follow up regarding her Diabetes mellitus, Type 2.    A1c trend:  Lab Results   Component Value Date    A1C 15.1 04/19/2023    A1C 15.1 01/17/2023    A1C 11.4 10/17/2022    A1C 10.4 07/15/2022    A1C 10.8 04/15/2022    A1C >14.0 05/10/2021    A1C 10.7 02/10/2021    A1C 7.6 11/09/2020    A1C 7.9 08/07/2020    A1C 8.1 05/04/2020     Current Diabetes medication:   1. Omnipod dash, using Novolog  2. Jardiance 25 mg daily--not taking  Statin use: yes, simvastatin 20 mg daily  ASA: yes, 81 mg daily    Marly's here a download of her CGM and insulin pump with possible diabetic medication adjustments.   Reports the following:  No issues with the Freestyle Bud   She's wearing her Omnipod Dash--no issues.      Otherwise, denies any new health concerns.     Weight trend   Wt Readings from Last 4 Encounters:   07/05/23 89.7 kg (197 lb 12.8 oz)   04/19/23 90 kg (198 lb 5 oz)   03/29/23 89 kg (196 lb 3.2 oz)   03/08/23 87.4 kg (192 lb 9.6 oz)     Has an appointment with her PCP, Amberly Whyte NP, this month    Patient Active Problem List   Diagnosis     Type 2 diabetes, HbA1c goal < 7% (H)     ACP (advance care  planning)     Stage 3 chronic kidney disease (H)     Pulmonary emphysema (H)     Hyperparathyroidism due to renal insufficiency (H)     Vitamin D deficiency     Intellectual disability     Breast fibrocystic disorder     Tobacco abuse     Microalbuminuria due to type 2 diabetes mellitus (H)     H/O colonoscopy     [place] cardiac risk <10% in next 10 years     Tubular adenoma of colon     Hyperlipidemia, unspecified hyperlipidemia type     Essential hypertension     Callus of foot     Memory disturbance     ADEEL (obstructive sleep apnea)     Tremor     Diabetic polyneuropathy associated with diabetes mellitus due to underlying condition (H)     Urinary incontinence, unspecified type       History reviewed. No pertinent past medical history.    Past Surgical History:   Procedure Laterality Date     BIOPSY BREAST Left 02/14/2008    patient states no bx, Clip in left breast/ stereo bxc 2-- no path in EPIC that far back     COLONOSCOPY N/A 08/20/2015    Procedure: COLONOSCOPY;  Surgeon: Gentry Porter MD;  Location: [facility]     COLONOSCOPY N/A 09/21/2018    Procedure: COLONOSCOPY;  COLONOSCOPY WITH POLYPECTOMY;  Surgeon: Gentry Porter MD;  Location: [facility]       Family History   Problem Relation Age of Onset     Diabetes Mother      Diabetes Father      Hypertension Brother      Diabetes Sister        Social History     Tobacco Use     Smoking status: Every Day     Packs/day: 1.00     Years: 34.00     Pack years: 34.00     Types: Cigarettes     Start date: 1/1/1979     Smokeless tobacco: Never     Tobacco comments:     Declined QP 05/13/2020   Substance Use Topics     Alcohol use: No     Alcohol/week: 0.0 standard drinks of alcohol       Current Outpatient Medications   Medication Sig Dispense Refill     Acetaminophen (TYLENOL PO) Take 325 mg by mouth every 6 hours as needed        amLODIPine (NORVASC) 5 MG tablet TAKE 1 TABLET BY MOUTH DAILY 90 tablet 0     aspirin (ASPIRIN LOW DOSE) 81 MG chewable  tablet CHEW AND SWALLOW 1 TABLET BY MOUTH DAILY 90 tablet 2     atorvastatin (LIPITOR) 40 MG tablet TAKE 1 TABLET BY MOUTH AT BEDTIME 90 tablet 2     budesonide-formoterol (SYMBICORT) 160-4.5 MCG/ACT Inhaler Inhale 2 puffs into the lungs 2 times daily 1 Inhaler 3     Cholecalciferol (VITAMIN D3) 50 MCG (2000 UT) CAPS TAKE 1 CAPSULE BY MOUTH DAILY 90 capsule 3     COMBIVENT RESPIMAT  MCG/ACT inhaler INHALE 1 PUFF INTO THE LUNGS 4 TIMES DAILY 4 g 3     Continuous Blood Gluc Sensor (FREESTYLE HANK 2 SENSOR) MISC CHANGE 1 SENSOR EVERY 14 DAYS, USE TO READ BLOOD SUGARS PER MANUFACTURERS INSTRUCTIONS 2 each 3     hydrocortisone (CORTAID) 1 % external cream Apply topically 2 times daily 453.6 g 0     insulin aspart (NOVOLOG VIAL) 100 UNITS/ML vial To be used with the insulin pump.  TDD: 70 units 30 mL 3     Insulin Disposable Pump (OMNIPOD DASH PODS, GEN 4,) MISC 1 each every 48 hours 15 each 5     insulin pen needle (32G X 4 MM) 32G X 4 MM miscellaneous Use 1 pen needles daily 100 each 3     INSULIN PUMP - OUTPATIENT Insulin pump: Omnipod Dash  Updated: 7/5/23  Basal: 1.2 (new)  Total basal: 28.8 units  CR: 12  ISF: 45  BG target: 100  Active insulin: 4 hours       JARDIANCE 25 MG TABS tablet TAKE 1 TABLET BY MOUTH DAILY 30 tablet 2     lisinopril (ZESTRIL) 40 MG tablet TAKE 1 TABLET BY MOUTH DAILY 90 tablet 0     nystatin (MYCOSTATIN) 654074 UNIT/GM external powder Apply topically 3 times daily 60 g 3     OneTouch Delica Lancets 33G MISC Inject 1 each Subcutaneous 3 times daily (before meals) 100 each 11     ONETOUCH ULTRA test strip USE TO TEST BLOOD SUGAR 4 TIMES DAILY 100 strip 1     order for DME Women briefs 50 each 1     primidone (MYSOLINE) 50 MG tablet TAKE 1 TABLET BY MOUTH AT BEDTIME 30 tablet 11     urea (GORDONS UREA) 40 % external ointment Apply topically 2 times daily 30 g 0     insulin glargine U-300 (TOUJEO SOLOSTAR) 300 UNIT/ML (1 units dial) pen Inject 20 Units Subcutaneous At Bedtime (Patient  "not taking: Reported on 7/5/2023) 9 mL 1       No Known Allergies    REVIEW OF SYSTEMS  Skin: negative  Eyes: negative, wears glasses    Ears/Nose/Throat: negative  Respiratory: No shortness of breath or hemoptysis; smoker's cough; hx of sleep apnea  Cardiovascular: negative  Gastrointestinal: negative  Genitourinary: negative  Musculoskeletal: negative; hx of left shoulder pain and left knee--depends on the weather   Neurologic: negative  Psychiatric: negative  Hematologic/Lymphatic/Immunologic: negative  Endocrine: positive for diabetes     OBJECTIVE:  /72   Pulse 103   Resp 16   Ht 1.626 m (5' 4\")   Wt 89.7 kg (197 lb 12.8 oz)   SpO2 91%   BMI 33.95 kg/m    Constitutional: alert and no distress  Respiratory:  Good diaphragmatic excursion.   Musculoskeletal: extremities normal- no gross deformities noted and gait normal  Skin: no suspicious lesions or rashes to visible skin  Psychiatric: mentation appears normal and affect normal/bright      LABS  Results for orders placed or performed in visit on 07/05/23   GLUCOSE MONITOR, 72 HOUR, PHYS INTERP     Status: None    Narrative    Date: 6/22 to 7/5/23  Glucose management indicator: 10.1%    Average glucose: 284    Glucose range  Very low (<54): 0  low (<70): 0  In target range (): 4%  High (>180): 39%  Very high (>250): 57%       ASSESSMENT / PLAN:  (F17.200) Nicotine dependence, uncomplicated, unspecified nicotine product type  (primary encounter diagnosis)  Comment: noted, encouraged quitting  Plan:   Spoke with patient regarding options for smoking cessation.  Various pharmacologic options and behavioral techniques were reviewed.  The relative effectiveness as well as risks and benefits were reviewed with patient.  Patient is interested in: No interventions    Smoking Cessation    Let us know when you are ready    (E11.65,  Z79.4) Type 2 diabetes mellitus with hyperglycemia, with long-term current use of insulin (H)  Comment: noted " downloads  Plan: insulin aspart (NOVOLOG VIAL) 100 UNITS/ML         vial, Insulin Disposable Pump (OMNIPOD DASH         PODS, GEN 4,) MISC, INSULIN PUMP - OUTPATIENT,         GLUCOSE MONITOR, 72 HOUR, PHYS INTERP          Will adjust her basal.    Insulin pump: Omnipod Dash  Updated: 7/5/23  Basal: 1.2 (new)  Total basal: 28.8 units  CR: 12  ISF: 45  BG target: 100  Active insulin: 4 hours    Keep taking your Jardiance  Education focused on adding a carb bolus before consuming meals--some is better than none     Follow up  As scheduled.      Call sooner if any issues    Patient Instructions   Continue working on healthy eating and moving (start low and slow, work up to 30 min, 5x/week)    BG goals:  Fasting and before meals <130, >70  2 hour after eating <180    We only need 1/2 of these numbers to be within target then your A1c will be within target    Medication changes   Watch for low BGs after insulin pump adjustment    Keep working on entering 30 grams with meals.      Follow up   Download when you see Amberly Whyte NP   2 months with me    Call me sooner if any problems/concerns and/or questions develop including consistent low BGs <70 or consistent high BGs >200  [phone] (Unit Coordinator)    [phone] (Anna Nurse)    Smoking:   Keep working on quitting              Time: 30 minutes  Barrier: see [facility]  Willingness to learn: accepting    Courtney PINTO [facility]  Diabetes and Wound Care    Cc: Amberly Whyte NP    With the electronic record, we can now more quickly and easily track our patient diabetic goals. Our diabetes clinical review is in progress and these are the indicators we are monitoring for good diabetes health.     1.) HbA1C less than 7 (measurement of your average blood sugars)  2.) Blood Pressure less than 140/80  3.) LDL less than 100 (bad cholesterol)  4.) HbA1C is checked in the last 6 months and below 7% (more frequently if not at goal or adjusting medications)  5.) LDL is checked in  the last 12 months (more frequently if not at goal or adjusting medications)  6.) Taking one baby aspirin daily (unless otherwise instructed)  7.) No tobacco use  8) Statin use     You have achieved 6 out of 8 of these and I am encouraging you to come in and get tuned up to achieve 8 out of 8!  Here is what you have achieved so far in my goals for you:  1.) HbA1C  less than 7:                              NO  Your last  HbA1C :  Lab Results   Component Value Date    A1C 15.1 01/17/2023    A1C 11.4 10/17/2022    A1C 10.4 07/15/2022    A1C 10.8 04/15/2022    A1C 8.6 01/14/2022    A1C >14.0 05/10/2021    A1C 10.7 02/10/2021    A1C 7.6 11/09/2020    A1C 7.9 08/07/2020    A1C 8.1 05/04/2020     2.) Blood Pressure less than 140/80:       YES      Your last                       BP Readings from Last 1 Encounters:   07/05/23 104/72     3.) LDL less than 100:                              YES      Your last     LDL Cholesterol Calculated   Date Value Ref Range Status   07/15/2022 66 <=100 mg/dL Final   11/09/2020 74 <100 mg/dL Final     Comment:     Desirable:       <100 mg/dl       4.) Checked HbA1C in the past 6 months: YES      5.) Checked LDL in the past 12 months:    YES      6.) Taking one aspirin daily:                       YES     7.) No tobacco use:                                        NO   8.) Statin use      YES

## 2023-07-09 NOTE — PATIENT INSTRUCTIONS
Continue working on healthy eating and moving (start low and slow, work up to 30 min, 5x/week)    BG goals:  Fasting and before meals <130, >70  2 hour after eating <180    We only need 1/2 of these numbers to be within target then your A1c will be within target    Medication changes   Watch for low BGs after insulin pump adjustment    Keep working on entering 30 grams with meals.      Follow up   Download when you see Amberly Whyte NP   2 months with me    Call me sooner if any problems/concerns and/or questions develop including consistent low BGs <70 or consistent high BGs >200  [phone] (Unit Coordinator)    [phone] (Anna Nurse)    Smoking:   Keep working on quitting

## 2023-07-19 ENCOUNTER — OFFICE VISIT (OUTPATIENT)
Dept: FAMILY MEDICINE | Facility: OTHER | Age: 58
End: 2023-07-19
Attending: NURSE PRACTITIONER
Payer: COMMERCIAL

## 2023-07-19 ENCOUNTER — ALLIED HEALTH/NURSE VISIT (OUTPATIENT)
Dept: EDUCATION SERVICES | Facility: OTHER | Age: 58
End: 2023-07-19
Attending: NURSE PRACTITIONER
Payer: COMMERCIAL

## 2023-07-19 VITALS
OXYGEN SATURATION: 94 % | SYSTOLIC BLOOD PRESSURE: 112 MMHG | TEMPERATURE: 98.5 F | HEART RATE: 88 BPM | DIASTOLIC BLOOD PRESSURE: 68 MMHG | WEIGHT: 200 LBS | BODY MASS INDEX: 34.33 KG/M2

## 2023-07-19 DIAGNOSIS — E11.65 TYPE 2 DIABETES MELLITUS WITH HYPERGLYCEMIA, WITH LONG-TERM CURRENT USE OF INSULIN (H): Primary | ICD-10-CM

## 2023-07-19 DIAGNOSIS — Z72.0 TOBACCO ABUSE: ICD-10-CM

## 2023-07-19 DIAGNOSIS — J43.9 PULMONARY EMPHYSEMA, UNSPECIFIED EMPHYSEMA TYPE (H): ICD-10-CM

## 2023-07-19 DIAGNOSIS — I10 ESSENTIAL HYPERTENSION: ICD-10-CM

## 2023-07-19 DIAGNOSIS — G47.33 OSA (OBSTRUCTIVE SLEEP APNEA): ICD-10-CM

## 2023-07-19 DIAGNOSIS — E11.29 MICROALBUMINURIA DUE TO TYPE 2 DIABETES MELLITUS (H): ICD-10-CM

## 2023-07-19 DIAGNOSIS — E78.5 HYPERLIPIDEMIA, UNSPECIFIED HYPERLIPIDEMIA TYPE: ICD-10-CM

## 2023-07-19 DIAGNOSIS — N18.30 STAGE 3 CHRONIC KIDNEY DISEASE, UNSPECIFIED WHETHER STAGE 3A OR 3B CKD (H): ICD-10-CM

## 2023-07-19 DIAGNOSIS — Z79.4 TYPE 2 DIABETES MELLITUS WITH HYPERGLYCEMIA, WITH LONG-TERM CURRENT USE OF INSULIN (H): Primary | ICD-10-CM

## 2023-07-19 DIAGNOSIS — E11.9 TYPE 2 DIABETES, HBA1C GOAL < 7% (H): Primary | ICD-10-CM

## 2023-07-19 DIAGNOSIS — D58.2 ELEVATED HEMOGLOBIN (H): ICD-10-CM

## 2023-07-19 DIAGNOSIS — R80.9 MICROALBUMINURIA DUE TO TYPE 2 DIABETES MELLITUS (H): ICD-10-CM

## 2023-07-19 DIAGNOSIS — E83.52 HYPERCALCEMIA: ICD-10-CM

## 2023-07-19 LAB
ALBUMIN SERPL BCG-MCNC: 4.3 G/DL (ref 3.5–5.2)
ALP SERPL-CCNC: 91 U/L (ref 35–104)
ALT SERPL W P-5'-P-CCNC: 10 U/L (ref 0–50)
ANION GAP SERPL CALCULATED.3IONS-SCNC: 12 MMOL/L (ref 7–15)
AST SERPL W P-5'-P-CCNC: 13 U/L (ref 0–45)
BASOPHILS # BLD AUTO: 0.1 10E3/UL (ref 0–0.2)
BASOPHILS NFR BLD AUTO: 1 %
BILIRUB SERPL-MCNC: 0.5 MG/DL
BUN SERPL-MCNC: 21.9 MG/DL (ref 8–23)
CALCIUM SERPL-MCNC: 10.5 MG/DL (ref 8.6–10)
CHLORIDE SERPL-SCNC: 93 MMOL/L (ref 98–107)
CHOLEST SERPL-MCNC: 174 MG/DL
CREAT SERPL-MCNC: 1.23 MG/DL (ref 0.51–0.95)
DEPRECATED HCO3 PLAS-SCNC: 30 MMOL/L (ref 22–29)
EOSINOPHIL # BLD AUTO: 0.2 10E3/UL (ref 0–0.7)
EOSINOPHIL NFR BLD AUTO: 2 %
ERYTHROCYTE [DISTWIDTH] IN BLOOD BY AUTOMATED COUNT: 12.7 % (ref 10–15)
EST. AVERAGE GLUCOSE BLD GHB EST-MCNC: 326 MG/DL
GFR SERPL CREATININE-BSD FRML MDRD: 50 ML/MIN/1.73M2
GLUCOSE SERPL-MCNC: 459 MG/DL (ref 70–99)
HBA1C MFR BLD: 13 %
HCT VFR BLD AUTO: 51.6 % (ref 35–47)
HDLC SERPL-MCNC: 52 MG/DL
HGB BLD-MCNC: 17.3 G/DL (ref 11.7–15.7)
IMM GRANULOCYTES # BLD: 0 10E3/UL
IMM GRANULOCYTES NFR BLD: 0 %
LDLC SERPL CALC-MCNC: 66 MG/DL
LYMPHOCYTES # BLD AUTO: 3.1 10E3/UL (ref 0.8–5.3)
LYMPHOCYTES NFR BLD AUTO: 34 %
MCH RBC QN AUTO: 30.4 PG (ref 26.5–33)
MCHC RBC AUTO-ENTMCNC: 33.5 G/DL (ref 31.5–36.5)
MCV RBC AUTO: 91 FL (ref 78–100)
MONOCYTES # BLD AUTO: 0.7 10E3/UL (ref 0–1.3)
MONOCYTES NFR BLD AUTO: 8 %
NEUTROPHILS # BLD AUTO: 5.1 10E3/UL (ref 1.6–8.3)
NEUTROPHILS NFR BLD AUTO: 55 %
NONHDLC SERPL-MCNC: 122 MG/DL
NRBC # BLD AUTO: 0 10E3/UL
NRBC BLD AUTO-RTO: 0 /100
PLATELET # BLD AUTO: 300 10E3/UL (ref 150–450)
POTASSIUM SERPL-SCNC: 4.6 MMOL/L (ref 3.4–5.3)
PROT SERPL-MCNC: 7.6 G/DL (ref 6.4–8.3)
RBC # BLD AUTO: 5.7 10E6/UL (ref 3.8–5.2)
SODIUM SERPL-SCNC: 135 MMOL/L (ref 136–145)
TRIGL SERPL-MCNC: 278 MG/DL
WBC # BLD AUTO: 9.1 10E3/UL (ref 4–11)

## 2023-07-19 PROCEDURE — G0463 HOSPITAL OUTPT CLINIC VISIT: HCPCS | Performed by: NURSE PRACTITIONER

## 2023-07-19 PROCEDURE — 82306 VITAMIN D 25 HYDROXY: CPT | Mod: ZL | Performed by: NURSE PRACTITIONER

## 2023-07-19 PROCEDURE — 85025 COMPLETE CBC W/AUTO DIFF WBC: CPT | Mod: ZL | Performed by: NURSE PRACTITIONER

## 2023-07-19 PROCEDURE — 36415 COLL VENOUS BLD VENIPUNCTURE: CPT | Mod: ZL | Performed by: NURSE PRACTITIONER

## 2023-07-19 PROCEDURE — 83970 ASSAY OF PARATHORMONE: CPT | Mod: ZL | Performed by: NURSE PRACTITIONER

## 2023-07-19 PROCEDURE — 99214 OFFICE O/P EST MOD 30 MIN: CPT | Performed by: NURSE PRACTITIONER

## 2023-07-19 PROCEDURE — 80061 LIPID PANEL: CPT | Mod: ZL | Performed by: NURSE PRACTITIONER

## 2023-07-19 PROCEDURE — 83036 HEMOGLOBIN GLYCOSYLATED A1C: CPT | Mod: ZL | Performed by: NURSE PRACTITIONER

## 2023-07-19 PROCEDURE — 80053 COMPREHEN METABOLIC PANEL: CPT | Mod: ZL | Performed by: NURSE PRACTITIONER

## 2023-07-19 NOTE — PROGRESS NOTES
Assessment & Plan     Type 2 diabetes, HbA1c goal < 7% (H)  Poorly controlled. A1C 13. Very difficult to manage as non-complaint with medications. She does have mild cognitive impairement, but we have sent her for neuropsych testing and no major concerns were noted. She lives in her own and seems to manage. Will again have her follow-up with the diabetic center. On statin. Blood pressure normal. See me 3 months, sooner with new or worsening symptoms.     - Hemoglobin A1c; Future  - Comprehensive metabolic panel (BMP + Alb, Alk Phos, ALT, AST, Total. Bili, TP); Future  - Hemoglobin A1c  - Comprehensive metabolic panel (BMP + Alb, Alk Phos, ALT, AST, Total. Bili, TP)    Stage 3 chronic kidney disease, unspecified whether stage 3a or 3b CKD (H)  Decreased, but stable. Will avoid all NSAIDs.     Tobacco abuse  Cessation encouraged.     Microalbuminuria due to type 2 diabetes mellitus (H)  On ACE. Will continue.     Essential hypertension  Well controlled. Continue current medications. Encouraged daily exercise and a low sodium diet. Recommended checking BP's 2x/wk, call the clinic if consistantly s>140 or d>90. Follow up in 3 months.     Hyperlipidemia, unspecified hyperlipidemia type  Tolerating statin. AST and ALT normal. Lipids controlled.     ADEEL (obstructive sleep apnea)  Will follow-up with Dr. Duval. Most likely cause of elevated hgb.     Elevated hemoglobin (H)  Hgb elevated at 17.3. On asa. Most likely due to chronic hypoxia, COPD, and smoking. Smoking cessation encouraged. Also has f/up with Dr. Duval with discuss CPAP.     - CBC with platelets and differential; Future  - CBC with platelets and differential    Pulmonary emphysema, unspecified emphysema type (H)  Controlled. Continue current medications.     Hypercalcemia  Calcium high at 10.5. Will run PTH and Vit D. Will notify patient of the results when available and intervene accordingly.     - Parathyroid Hormone Intact; Future  - Vitamin D  Deficiency; Future  - Parathyroid Hormone Intact  - Vitamin D Deficiency        Return in about 3 months (around 10/19/2023).    Amberly Whyte NP  [facility] [place] Luke is a 57 year old, presenting for the following health issues:  Hypertension and Diabetes    HPI     Diabetes Follow-up    How often are you checking your blood sugar? One time daily  What time of day are you checking your blood sugars (select all that apply)?  Before meals  Have you had any blood sugars above 200?  No  Have you had any blood sugars below 70?  No    What symptoms do you notice when your blood sugar is low?  Shaky    What concerns do you have today about your diabetes? None     Do you have any of these symptoms? (Select all that apply)  No numbness or tingling in feet.  No redness, sores or blisters on feet.  No complaints of excessive thirst.  No reports of blurry vision.  No significant changes to weight.     Follows with the diabetic center. Last visit with them was on 7/5/23. Note was reviewed. Average glucose was 384. Sees them again today.     Taking Novolog, Toujeo, and Jardiance without side effects. Metformin caused an upset stomach. Is often noncomplaint with her medications. Does not take as prescribed.     On asa.     She denies chest pain, dizziness, syncope, or palpitations. Some chronic shortness of breath related to her COPD. Feels this has continued to improved. Denies any recent shortness of breath.     Trying to walk more when the weather is nicer    On ACE.     She does have chronic kidney disease. Typically avoids NSAIDs.     No abdominal pain. No nausea or vomiting.      Due for several vaccines. Declines.     She does smoke, currently smoking 1 ppd. No interest in quitting.       Hyperlipidemia Follow-Up      Are you regularly taking any medication or supplement to lower your cholesterol?   Yes- Lipitor 40 mg    Are you having muscle aches or other side effects that you  think could be caused by your cholesterol lowering medication?  No     Denies chest pain, dizziness, syncope, or palpitations.  Chronic shortness of breath.    Hypertension Follow-up      Do you check your blood pressure regularly outside of the clinic? Yes     Are you following a low salt diet? No    Are your blood pressures ever more than 140 on the top number (systolic) OR more   than 90 on the bottom number (diastolic), for example 140/90? No   Using lisinopril 40 mg with amlodipine 5 mg. No side effects.     Elevated Hgb, on asa. Likely from COPD and ADEEL. Smoking cessation encouraged. She also has severe ADEEL with nearly continuous nocturnal hypoxia. Follows with Dr. Duval and has follow up with him to discuss recent results. JAK2 mutation was negative    BP Readings from Last 2 Encounters:   07/19/23 112/68   07/05/23 104/72     Hemoglobin A1C (%)   Date Value   07/19/2023 13.0 (H)   04/19/2023 15.1 (H)   05/10/2021 >14.0 (H)   02/10/2021 10.7 (H)     LDL Cholesterol Calculated (mg/dL)   Date Value   07/19/2023 66   07/15/2022 66   11/09/2020 74   02/04/2020 49       COPD Follow-Up    Overall, how are your COPD symptoms since your last clinic visit?  Better    How much fatigue or shortness of breath do you have when you are walking?  Same as usual    How much shortness of breath do you have when you are resting?  None    How often do you cough? Sometimes    Have you noticed any change in your sputum/phlegm?  No    Have you experienced a recent fever? No    Please describe how far you can walk without stopping to rest:  1-2 miles    How many flights of stairs are you able to walk up without stopping?  None    Have you had any Emergency Room Visits, Urgent Care Visits, or Hospital Admissions because of your COPD since your last office visit?  No    History   Smoking Status     Every Day     Packs/day: 1.00     Years: 34.00     Types: Cigarettes     Start date: 1/1/1979   Smokeless Tobacco     Never     No  results found for: FEV1, CFS1BQS      How many servings of fruits and vegetables do you eat daily?  2-3    On average, how many sweetened beverages do you drink each day (Examples: soda, juice, sweet tea, etc.  Do NOT count diet or artificially sweetened beverages)?   1    How many days per week do you exercise enough to make your heart beat faster? 3 or less    How many minutes a day do you exercise enough to make your heart beat faster? 9 or less    How many days per week do you miss taking your medication? 0        Review of Systems   Constitutional, HEENT, cardiovascular, pulmonary, gi and gu systems are negative, except as otherwise noted.      Objective    /68 (BP Location: Right arm, Patient Position: Sitting, Cuff Size: Adult Large)   Pulse 88   Temp 98.5  F (36.9  C) (Tympanic)   Wt 90.7 kg (200 lb)   SpO2 94%   BMI 34.33 kg/m    Body mass index is 34.33 kg/m .  Physical Exam   GENERAL: healthy, alert and no distress, overweight  EYES: Eyes grossly normal to inspection, PERRL and conjunctivae and sclerae normal  HENT: ear canals and TM's normal, nose and mouth without ulcers or lesions  NECK: no adenopathy, no asymmetry, masses, or scars and thyroid normal to palpation  RESP: lungs clear to auscultation - no rales, rhonchi or wheezes  CV: regular rate and rhythm, no murmur, trace bilateral peripheral edema and peripheral pulses diminished, but present  ABDOMEN: soft, nontender, obese, no masses and bowel sounds normal  MS: no gross musculoskeletal defects noted, no edema  NEURO: Normal strength and tone, cognitively delayed-baseline and speech normal  PSYCH: affect normal/bright  Diabetic foot exam: declined exam    Results for orders placed or performed in visit on 07/19/23 (from the past 24 hour(s))   Hemoglobin A1c   Result Value Ref [facility]    Estimated Average Glucose 326 mg/dL    Hemoglobin A1C 13.0 (H) <5.7 %   Comprehensive metabolic panel (BMP + Alb, Alk Phos, ALT, AST, Total. Bili, TP)    Result Value Ref [facility]    Sodium 135 (L) 136 - 145 mmol/L    Potassium 4.6 3.4 - 5.3 mmol/L    Chloride 93 (L) 98 - 107 mmol/L    Carbon Dioxide (CO2) 30 (H) 22 - 29 mmol/L    Anion Gap 12 7 - 15 mmol/L    Urea Nitrogen 21.9 8.0 - 23.0 mg/dL    Creatinine 1.23 (H) 0.51 - 0.95 mg/dL    Calcium 10.5 (H) 8.6 - 10.0 mg/dL    Glucose 459 (H) 70 - 99 mg/dL    Alkaline Phosphatase 91 35 - 104 U/L    AST 13 0 - 45 U/L    ALT 10 0 - 50 U/L    Protein Total 7.6 6.4 - 8.3 g/dL    Albumin 4.3 3.5 - 5.2 g/dL    Bilirubin Total 0.5 <=1.2 mg/dL    GFR Estimate 50 (L) >60 mL/min/1.73m2   Lipid Profile (Chol, Trig, HDL, LDL calc)   Result Value Ref [facility]    Cholesterol 174 <200 mg/dL    Triglycerides 278 (H) <150 mg/dL    Direct Measure HDL 52 >=50 mg/dL    LDL Cholesterol Calculated 66 <=100 mg/dL    Non HDL Cholesterol 122 <130 mg/dL    Narrative    Cholesterol  Desirable:  <200 mg/dL    Triglycerides  Normal:  Less than 150 mg/dL  Borderline High:  150-199 mg/dL  High:  200-499 mg/dL  Very High:  Greater than or equal to 500 mg/dL    Direct Measure HDL  Female:  Greater than or equal to 50 mg/dL   Male:  Greater than or equal to 40 mg/dL    LDL Cholesterol  Desirable:  <100mg/dL  Above Desirable:  100-129 mg/dL   Borderline High:  130-159 mg/dL   High:  160-189 mg/dL   Very High:  >= 190 mg/dL    Non HDL Cholesterol  Desirable:  130 mg/dL  Above Desirable:  130-159 mg/dL  Borderline High:  160-189 mg/dL  High:  190-219 mg/dL  Very High:  Greater than or equal to 220 mg/dL   CBC with platelets and differential    Narrative    The following orders were created for panel order CBC with platelets and differential.  Procedure                               Abnormality         Status                     ---------                               -----------         ------                     CBC with platelets and d...[[id number]]  Abnormal            Final result                 Please view results for these tests on the individual  orders.   CBC with platelets and differential   Result Value Ref Range    WBC Count 9.1 4.0 - 11.0 10e3/uL    RBC Count 5.70 (H) 3.80 - 5.20 10e6/uL    Hemoglobin 17.3 (H) 11.7 - 15.7 g/dL    Hematocrit 51.6 (H) 35.0 - 47.0 %    MCV 91 78 - 100 fL    MCH 30.4 26.5 - 33.0 pg    MCHC 33.5 31.5 - 36.5 g/dL    RDW 12.7 10.0 - 15.0 %    Platelet Count 300 150 - 450 10e3/uL    % Neutrophils 55 %    % Lymphocytes 34 %    % Monocytes 8 %    % Eosinophils 2 %    % Basophils 1 %    % Immature Granulocytes 0 %    NRBCs per 100 WBC 0 <1 /100    Absolute Neutrophils 5.1 1.6 - 8.3 10e3/uL    Absolute Lymphocytes 3.1 0.8 - 5.3 10e3/uL    Absolute Monocytes 0.7 0.0 - 1.3 10e3/uL    Absolute Eosinophils 0.2 0.0 - 0.7 10e3/uL    Absolute Basophils 0.1 0.0 - 0.2 10e3/uL    Absolute Immature Granulocytes 0.0 <=0.4 10e3/uL    Absolute NRBCs 0.0 10e3/uL

## 2023-07-20 LAB
DEPRECATED CALCIDIOL+CALCIFEROL SERPL-MC: 49 UG/L (ref 20–75)
PTH-INTACT SERPL-MCNC: 36 PG/ML (ref 15–65)

## 2023-07-20 NOTE — PROGRESS NOTES
Noted the following CGM download:    Date: 7/6 to 7/19/23  Glucose management indicator: 10.1%    Average glucose: 285    Glucose range  Very low (<54): 0  low (<70): 0  In target range (): 14%  High (>180): 25%  Very high (>250): 61%    Plan:  Noted a large BG spike after 9 am (food related)  Keep working on entering your carb bolus BEFORE consuming carbs.      Courtney Chaudhary APRN FNP-BC  Diabetes and Wound Care

## 2023-07-21 ENCOUNTER — TELEPHONE (OUTPATIENT)
Dept: FAMILY MEDICINE | Facility: OTHER | Age: 58
End: 2023-07-21

## 2023-07-21 ENCOUNTER — HOSPITAL ENCOUNTER (OUTPATIENT)
Dept: CT IMAGING | Facility: HOSPITAL | Age: 58
Discharge: HOME | End: 2023-07-21
Attending: NURSE PRACTITIONER | Admitting: NURSE PRACTITIONER
Payer: COMMERCIAL

## 2023-07-21 DIAGNOSIS — Z87.891 PERSONAL HISTORY OF TOBACCO USE: ICD-10-CM

## 2023-07-21 PROCEDURE — 71271 CT THORAX LUNG CANCER SCR C-: CPT

## 2023-07-21 NOTE — TELEPHONE ENCOUNTER
Spoke with patient, she is not taking anything with calcium in it she states. She is already scheduled to have her BMP check on 8/9/2023.

## 2023-07-21 NOTE — TELEPHONE ENCOUNTER
----- Message from Amberly Whyte NP sent at 7/21/2023  5:11 AM CDT -----  PTH and Vit D normal. Calcium was high. Be sure she is not taking any kind of supplement or consuming a lot of calcium. Should recheck in 2-3 weeks via lab only. Once taking to patient, please pend lab only bmp. Thanks, Amberly

## 2023-07-24 DIAGNOSIS — Z53.9 PERSONS ENCOUNTERING HEALTH SERVICES FOR SPECIFIC PROCEDURES, NOT CARRIED OUT: Primary | ICD-10-CM

## 2023-07-24 PROCEDURE — G0179 MD RECERTIFICATION HHA PT: HCPCS | Performed by: NURSE PRACTITIONER

## 2023-07-25 DIAGNOSIS — E55.9 VITAMIN D DEFICIENCY: ICD-10-CM

## 2023-07-25 DIAGNOSIS — E11.9 TYPE 2 DIABETES, HBA1C GOAL < 7% (H): ICD-10-CM

## 2023-07-26 RX ORDER — EMPAGLIFLOZIN 25 MG/1
TABLET, FILM COATED ORAL
Qty: 30 TABLET | Refills: 0 | Status: SHIPPED | OUTPATIENT
Start: 2023-07-26 | End: 2023-08-25

## 2023-07-26 RX ORDER — ACETAMINOPHEN 160 MG
TABLET,DISINTEGRATING ORAL
Qty: 90 CAPSULE | Refills: 0 | Status: SHIPPED | OUTPATIENT
Start: 2023-07-26 | End: 2023-10-25

## 2023-07-26 NOTE — TELEPHONE ENCOUNTER
Jardiance 25 mg      Last Written Prescription Date:  04/26/2023  Last Fill Quantity: 30,   # refills: 2  Last Office Visit: 07/19/2023  Future Office visit:    Next 5 appointments (look out 90 days)      Oct 23, 2023  2:00 PM  (Arrive by 1:45 PM)  SHORT with Amberly Whyte NP  [facility] ([facility] [place] ) [address]  [place] [address]  [phone]             Routing refill request to provider for review/approval because:  Drug not on the [facility], [facility] or [facility] refill protocol or controlled substance

## 2023-07-26 NOTE — TELEPHONE ENCOUNTER
Vitamin d3      Last Written Prescription Date:  07/26/2023  Last Fill Quantity: 90,   # refills: 3  Last Office Visit: 07/19/2023  Future Office visit:    Next 5 appointments (look out 90 days)      Oct 23, 2023  2:00 PM  (Arrive by 1:45 PM)  SHORT with Amberly Whyte NP  [facility] [place] ([facility] [place] ) [address]  [place] [address]  [phone]             Routing refill request to provider for review/approval because:  Drug not on the [facility], [facility] or [facility] refill protocol or controlled substance

## 2023-08-01 DIAGNOSIS — E78.5 HYPERLIPIDEMIA, UNSPECIFIED HYPERLIPIDEMIA TYPE: ICD-10-CM

## 2023-08-01 RX ORDER — ATORVASTATIN CALCIUM 40 MG/1
TABLET, FILM COATED ORAL
Qty: 90 TABLET | Refills: 3 | Status: SHIPPED | OUTPATIENT
Start: 2023-08-01 | End: 2024-07-23

## 2023-08-01 NOTE — TELEPHONE ENCOUNTER
Lipitor 40 mg       Last Written Prescription Date:  11/10/2022  Last Fill Quantity: 90,   # refills: 2  Last Office Visit: 07/19/2023  Future Office visit:    Next 5 appointments (look out 90 days)      Oct 23, 2023  2:00 PM  (Arrive by 1:45 PM)  SHORT with Amberly Whyte NP  [facility] ([facility] [place] ) [address]  [place] [address]  [phone]             Routing refill request to provider for review/approval because:  Drug not on the [facility], [facility] or [facility] refill protocol or controlled substance

## 2023-08-02 DIAGNOSIS — G47.33 OSA (OBSTRUCTIVE SLEEP APNEA): Primary | ICD-10-CM

## 2023-08-09 ENCOUNTER — LAB (OUTPATIENT)
Dept: LAB | Facility: OTHER | Age: 58
End: 2023-08-09
Payer: COMMERCIAL

## 2023-08-09 DIAGNOSIS — G47.33 OSA (OBSTRUCTIVE SLEEP APNEA): ICD-10-CM

## 2023-08-09 LAB
BASE EXCESS BLDV CALC-SCNC: 3.7 MMOL/L (ref -7.7–1.9)
HCO3 BLDV-SCNC: 30 MMOL/L (ref 21–28)
O2/TOTAL GAS SETTING VFR VENT: 21 %
OXYHGB MFR BLDV: 74 % (ref 70–75)
PCO2 BLDV: 50 MM HG (ref 40–50)
PH BLDV: 7.39 [PH] (ref 7.32–7.43)
PO2 BLDV: 40 MM HG (ref 25–47)

## 2023-08-09 PROCEDURE — 36415 COLL VENOUS BLD VENIPUNCTURE: CPT | Mod: ZL

## 2023-08-09 PROCEDURE — 82805 BLOOD GASES W/O2 SATURATION: CPT | Mod: ZL

## 2023-08-16 ENCOUNTER — THERAPY VISIT (OUTPATIENT)
Dept: SLEEP MEDICINE | Facility: HOSPITAL | Age: 58
End: 2023-08-16
Attending: FAMILY MEDICINE
Payer: COMMERCIAL

## 2023-08-16 DIAGNOSIS — G47.33 OSA (OBSTRUCTIVE SLEEP APNEA): ICD-10-CM

## 2023-08-16 PROCEDURE — 95811 POLYSOM 6/>YRS CPAP 4/> PARM: CPT | Mod: 26 | Performed by: FAMILY MEDICINE

## 2023-08-16 PROCEDURE — 95811 POLYSOM 6/>YRS CPAP 4/> PARM: CPT

## 2023-08-17 NOTE — PROGRESS NOTES
Full titration was run showing all stages of sleep at a 75% efficiency. O2 was added shortly after the study start for low sats. Pt was ended at 2L of O2 to stabilize saturations during REM. BiPAP was titrated up to 22/18. Obstructive events remained until higher EPAPs. Pt did not tolerate under the nose masks. Over the nose is preferred.

## 2023-08-19 NOTE — PROCEDURES
[facility]  SLEEP STUDY INTERPRETATION  TITRATION STUDY      Patient: MATTHEW STEEN  YOB: 1965  Study Date: 2023  MRN: [id number]  Referring Provider: Mukund  Ordering Provider: Mukund    Indications for Polysomnography: The patient is a 58 year old Female who is 0' and weighs 200 lbs. Her BMI is 34, [place] sleepiness scale - and neck circumference is - cm. Relevant medical history includes morbid obesity, HTN, elevated Hgb, DM II, COPD.  A polysomnogram with PAP titration was performed to correct for sleep apnea/hypoventilation/hypoxemia.    Prior Sleep Testin2020 - PSG with weight 202 lbs.  AHI 43.1, events appearing primarily obstructive in nature.  Mean Spo2 86%, isabel SpO2 78%, 96.2% of recording was <= 89%.  No clear REM observed.  No PLM's.    Polysomnogram Data: A full night polysomnogram recorded the standard physiologic parameters including EEG, EOG, EMG, ECG, nasal and oral airflow. Respiratory parameters of chest and abdominal movements were recorded with respiratory inductance plethysmography. Oxygen saturation was recorded by pulse oximetry. Hypopnea scoring rule used: 1B 4%.    Treatment PSG  Sleep Architecture: Delayed sleep onset and REM latency, decreased percentages of N3 and REM, supine REM observed.    The total recording time of the polysomnogram was 512.9 minutes. The total sleep time was 385.5 minutes. Sleep latency was increased at 36.4 minutes without the use of a sleep aid. REM latency was 345.5 minutes. Arousal index was increased at 28.5 arousals per hour. Sleep efficiency was decreased at 75.2%. Wake after sleep onset was 90.5 minutes. The patient spent 18.8% of total sleep time in Stage N1, 66.5% in Stage N2, 7.4% in Stage N3, and 7.3% in REM. Time in REM supine was 28.0 minutes.    Respiration: Bilevel PAP at 21/16 cm H2O in spontaneous mode with supplemental oxygen at 2 LPM appeared largely effective, including supine REM.  Pre-study VBG was WNL (pH  7.42, pCO2 38, HCO3 24).  Nocturnal supplemental oxygen was started at 1 LPM prior to sleep onset due to sustained hypoxemia in low-80's in supine wakefulness.  Bilevel PAP at 19/14 cm H2O spontaneous with O2 at 1 LPM appeared largely effective in lateral NREM.  Seen to have sustained hypoxemia in first REM period (supine) on bilevel PAP 21/16 cm H2O with O2 at 1 LPM.  Oxygen increased to 2 LPM and seen to have improvement in supine REM SpO2.  Seen to have some residual obstructive events in final lateral NREM period of bilevel PAP 22 / 17-18 cm H2O with O2 at 2 LPM.  This titration was considered Adequate (residual AHI with 75% decrease or above constraints without REMsupine sleep at final pressure).  Snoring - was reported as absent on treatment.  Respiratory rate and pattern - was notable for normal respiratory rate and pattern.  Sustained Sleep Associated Hypoventilation - Transcutaneous carbon dioxide monitoring was not used, however pre-study VBG was WNL.  Sleep Associated Hypoxemia - (Greater than 5 minutes O2 sat at or below 88%) was present. Baseline oxygen saturation was 89.5%. Lowest oxygen saturation was 51.0%. Time spent less than or equal to 88% was 119.2 minutes. Time spent less than or equal to 89% was 177.7 minutes.    Movement Activity: Nothing of note  Periodic Limb Activity - There were - PLMs during the entire study. The PLM index was - movements per hour. The PLM Arousal Index was - per hour.  REM EMG Activity - Excessive transient/sustained muscle activity was not present.  Nocturnal Behavior - Abnormal sleep related behaviors were not noted during/arising out of NREM / REM sleep  Bruxism - None apparent.    Cardiac Summary: Appears NSR  The average pulse rate was 84.1 bpm. The minimum pulse rate was 73.0 bpm while the maximum pulse rate was 110.0 bpm.     Assessment:   Delayed sleep onset and REM latency, decreased percentages of N3 and REM, supine REM observed.  Bilevel PAP at 21/16 cm H2O  in spontaneous mode with supplemental oxygen at 2 LPM appeared largely effective, including supine REM.    Pre-study VBG was WNL (pH 7.42, pCO2 38, HCO3 24).    Recommendations:  Treatment of ADEEL with bilevel PAP 21/16 cmH2O in spontaneous mode with nocturnal supplemental oxygen at 2 LPM. Recommend clinical follow up with sleep management team, for coaching and review of effectiveness and compliance measures.  Advice regarding the risks of drowsy driving.  Suggest optimizing sleep schedule and avoiding sleep deprivation.  Weight management (if BMI > 30).    Diagnostic Codes:   Obstructive Sleep Apnea G47.33  Sleep Hypoxemia/Hypoventilation G47.36      _____________________________________   Electronically Signed By: Anupam Duval MD (8/18/2023)

## 2023-08-23 DIAGNOSIS — E11.9 TYPE 2 DIABETES, HBA1C GOAL < 7% (H): ICD-10-CM

## 2023-08-23 NOTE — PROGRESS NOTES
Virtual Visit Details    Type of service:  Telephone Visit   Phone call duration: 25 minutes     Marly Cannon is a 58 year old female who is being evaluated via a billable telephone visit.       What phone number would you like to be contacted at?PHONE@ [phone]  Home Phone [phone]   Work Phone Not on file.   Mobile [phone]       How would you like to obtain your AVS? [employer]       Telephone Virtual Visit Details     Type of service:  Telephone Virtual Visit     Originating Location (pt. Location): Home     Distant Location (provider location):  Off-site, [facility] [place]      Start Time:  1pm  End Time: 1:20 PM    Virtual visit for review of sleep testing results.     Assessment / Plan:     1.) Severe ADEEL with severe sleep-associated hypoxemia and sustained hypoxemia, with increased concern for hypoventilation.  2.)  Unclear underlying pulmonary disease with presumed obstructive / restrictive disease overlap with severe diffusion impairment.  3.)  Ongoing smoking with prior elevated carbon monoxide levels  4.)  Obesity (BMI ~ 34)  5.)  Polycythemia (currently presumed 2/2 hypoxemia)     Unable to tolerate bilevel PAP auto-titrate with min EPAP 5, max IPAP 20, PS 7 or alternate settings of min EPAP 5, max IPAP 15, PS 4 on room air.    Per titration PSG on 8/16/2023,  Bilevel PAP at 21/16 cm H2O in spontaneous mode with supplemental oxygen at 2 LPM appeared largely effective, including supine REM. Pre-study VBG was WNL (pH 7.42, pCO2 38, HCO3 24).     Echo WNL, PFT's suggestive of severe obstructive disease and can't rule out restrictive disease.    Overall, our plan today is:  - I strongly recommended start of treatment with Bilevel PAP at 21/16 cm H2O in spontaneous mode with supplemental oxygen at 2 LPM  - I strongly recommended referral to pulmonology for management of severe obstructive pulmonary disease though can't rule out restrictive disease given patient unable to  complete PFT maneuvers.    Given that she received sleeping much better, she is unsure if she really needs to do these things.  I reiterated my chart mentation, and she will like to think about this.  I did state that we will try to reach out in 2 weeks if we have not heard otherwise.      SUBJECTIVE:  Marly Cannon is a 58 year old female.    58 year old yo female who is referred for chronic insomnia, history of severe ADEEL.     Pertinent PMHx of morbid obesity, HTN, elevated Hgb, DM II, COPD, unclear cognitive disorder.     PFT's:  2023 - Weight 200 lbs.  Noted by technologist that patient was unable to follow any direction to follow any of the maneuvers despite extensive encouragement and coaching.  Spirometry consistent with airway obstruction, increased airway resistance / decreased specific conductance suggestive of restrictive disease, lung volumes suggestive of overinflation and air trapping.  No response to bronchodilators.  Overall, severe obstructive disease.     Echo:  3/2/2023 - Bubble study.  LVEF 60-65%, normal RV, no evidence of shunt.     Elevated carbon monoxide level of 3.4 on 3/11/2021.       Latest Reference Range & Units 14 16:38 08/18/15 07:36 16 16:48 18 09:23 19 11:30 20 13:35 20 08:42 21 14:03 22 13:49 10/17/22 08:59 23 15:05   Hemoglobin 11.7 - 15.7 g/dL 16.4 (H) 14.0 16.6 (H) 17.9 (H) 16.9 (H) 16.1 (H) 16.7 (H) 16.3 (H) 18.6 (H) 17.3 (H) 17.1 (H)   (H): Data is abnormally high     Prior Sleep Testin2020 - PSG with weight 202 lbs.  AHI 43.1, events appearing primarily obstructive in nature.  Mean Spo2 86%, isabel SpO2 78%, 96.2% of recording was <= 89%.  No clear REM observed.  No PLM's.     2021 - Marly feels that the bilevel is very uncomfortable and she could not breathe in or out while trying to use it.  Overall usage was minimal.  She is open to retrying the bilevel if we can decrease the pressures.  A/P to  decrease bilevel PAP from min EPAP 5 / max IPAP 20 / PS 7 to min EPAP 5 / max IPAP 15 / PS 4 on room air.     2021 - Bilevel PAP returned given non-compliance.     3/2/2023 -she reports that she is not sure why she had this visit today.  We discussed my concerns in relation to the untreated severe obstructive sleep apnea and severe and sustained nocturnal hypoxemia.  We also discussed in detail our work-up evaluation for ventilation/perfusion mismatch, as well as finding effective treatment for severe obstructive sleep apnea and sustained hypoxemia.    A/P for all-night bilevel PAP titration PSG with pre-study VBG, repeat full PFT's, echo.    Today -we discussed and reviewed her sleep test results.  She feels that the bilevel was very comfortable that night and had no concerns.  But, she also feels that she is now sleeping very well without any specific treatment and feels that she does not need further treatments at this time.    SLEEP STUDY INTERPRETATION  TITRATION STUDY        Patient: MATTHEW STEEN  YOB: 1965  Study Date: 2023  MRN: [id number]  Referring Provider: Mukund  Ordering Provider: Mukund     Indications for Polysomnography: The patient is a 58 year old Female who is 0' and weighs 200 lbs. Her BMI is 34, [place] sleepiness scale - and neck circumference is - cm. Relevant medical history includes morbid obesity, HTN, elevated Hgb, DM II, COPD.  A polysomnogram with PAP titration was performed to correct for sleep apnea/hypoventilation/hypoxemia.     Prior Sleep Testin2020 - PSG with weight 202 lbs.  AHI 43.1, events appearing primarily obstructive in nature.  Mean Spo2 86%, isabel SpO2 78%, 96.2% of recording was <= 89%.  No clear REM observed.  No PLM's.     Polysomnogram Data: A full night polysomnogram recorded the standard physiologic parameters including EEG, EOG, EMG, ECG, nasal and oral airflow. Respiratory parameters of chest and abdominal movements were  recorded with respiratory inductance plethysmography. Oxygen saturation was recorded by pulse oximetry. Hypopnea scoring rule used: 1B 4%.     Treatment PSG  Sleep Architecture: Delayed sleep onset and REM latency, decreased percentages of N3 and REM, supine REM observed.    The total recording time of the polysomnogram was 512.9 minutes. The total sleep time was 385.5 minutes. Sleep latency was increased at 36.4 minutes without the use of a sleep aid. REM latency was 345.5 minutes. Arousal index was increased at 28.5 arousals per hour. Sleep efficiency was decreased at 75.2%. Wake after sleep onset was 90.5 minutes. The patient spent 18.8% of total sleep time in Stage N1, 66.5% in Stage N2, 7.4% in Stage N3, and 7.3% in REM. Time in REM supine was 28.0 minutes.     Respiration: Bilevel PAP at 21/16 cm H2O in spontaneous mode with supplemental oxygen at 2 LPM appeared largely effective, including supine REM.  Pre-study VBG was WNL (pH 7.42, pCO2 38, HCO3 24).  Nocturnal supplemental oxygen was started at 1 LPM prior to sleep onset due to sustained hypoxemia in low-80's in supine wakefulness.  Bilevel PAP at 19/14 cm H2O spontaneous with O2 at 1 LPM appeared largely effective in lateral NREM.  Seen to have sustained hypoxemia in first REM period (supine) on bilevel PAP 21/16 cm H2O with O2 at 1 LPM.  Oxygen increased to 2 LPM and seen to have improvement in supine REM SpO2.  Seen to have some residual obstructive events in final lateral NREM period of bilevel PAP 22 / 17-18 cm H2O with O2 at 2 LPM.  This titration was considered Adequate (residual AHI with 75% decrease or above constraints without REMsupine sleep at final pressure).  Snoring - was reported as absent on treatment.  Respiratory rate and pattern - was notable for normal respiratory rate and pattern.  Sustained Sleep Associated Hypoventilation - Transcutaneous carbon dioxide monitoring was not used, however pre-study VBG was WNL.  Sleep Associated  Hypoxemia - (Greater than 5 minutes O2 sat at or below 88%) was present. Baseline oxygen saturation was 89.5%. Lowest oxygen saturation was 51.0%. Time spent less than or equal to 88% was 119.2 minutes. Time spent less than or equal to 89% was 177.7 minutes.     Movement Activity: Nothing of note  Periodic Limb Activity - There were - PLMs during the entire study. The PLM index was - movements per hour. The PLM Arousal Index was - per hour.  REM EMG Activity - Excessive transient/sustained muscle activity was not present.  Nocturnal Behavior - Abnormal sleep related behaviors were not noted during/arising out of NREM / REM sleep  Bruxism - None apparent.     Cardiac Summary: Appears NSR  The average pulse rate was 84.1 bpm. The minimum pulse rate was 73.0 bpm while the maximum pulse rate was 110.0 bpm.      Assessment:   Delayed sleep onset and REM latency, decreased percentages of N3 and REM, supine REM observed.  Bilevel PAP at 21/16 cm H2O in spontaneous mode with supplemental oxygen at 2 LPM appeared largely effective, including supine REM.    Pre-study VBG was WNL (pH 7.42, pCO2 38, HCO3 24).     Recommendations:  Treatment of ADEEL with bilevel PAP 21/16 cmH2O in spontaneous mode with nocturnal supplemental oxygen at 2 LPM. Recommend clinical follow up with sleep management team, for coaching and review of effectiveness and compliance measures.  Advice regarding the risks of drowsy driving.  Suggest optimizing sleep schedule and avoiding sleep deprivation.  Weight management (if BMI > 30).     Diagnostic Codes:   Obstructive Sleep Apnea G47.33  Sleep Hypoxemia/Hypoventilation G47.36      _____________________________________   Electronically Signed By: Anupam Duval MD (8/18/2023)       Past medical history:    Patient Active Problem List    Diagnosis Date Noted    Diabetic polyneuropathy associated with diabetes mellitus due to underlying condition (H) 01/17/2023     Priority: Medium    Urinary  incontinence, unspecified type 01/17/2023     Priority: Medium    ADEEL (obstructive sleep apnea) 02/16/2021     Priority: Medium     Interp for PSG dated 12/16/2020.     Weight 202 lbs.  Total sleep time 417.5 minutes, sleep efficiency 83%, sleep latency 15 minutes, REM latency - minutes.  Arousal index 45.6.  Sleep architecture was incredibly fragmented with no clear REM observed.     AHI 43.1, events appearing primarily obstructive in nature.  Mean Spo2 86%, isabel SpO2 78%, 96.2% of recording was <= 89%.       EKG appeared NSR.     No PLM's observed.      Unable to assess for phasic or tonic EMG activity in REM.  No abnormal nocturnal behaviors observed.     Assessment:  - Severe ADEEL with severe sleep-associated hypoxemia and sustained hypoxemia, with increased concern for hypoventilation.  - Potential that severity under-reported given highly fragmented sleep architecture and no clear REM observed.      Memory disturbance 02/13/2020     Priority: Medium    Callus of foot 05/29/2019     Priority: Medium    Hyperlipidemia, unspecified hyperlipidemia type 05/28/2019     Priority: Medium    Essential hypertension 05/28/2019     Priority: Medium    H/O colonoscopy 04/24/2019     Priority: Medium     Had in 10/2018, due for repeat 5 years      [place] cardiac risk <10% in next 10 years 02/22/2019     Priority: Medium     Overview:   Started high intensity statin.       Tubular adenoma of colon 09/28/2018     Priority: Medium    Intellectual disability 08/01/2017     Priority: Medium    ACP (advance care planning) 09/09/2016     Priority: Medium     Advance Care Planning 9/9/2016: ACP Review of Chart / Resources Provided:  Reviewed chart for advance care plan.  Marly Cannon has been provided information and resources to begin or update their advance care plan.  Added by Naty Allen            Hyperparathyroidism due to renal insufficiency (H) 06/14/2016     Priority: Medium    Vitamin D deficiency 06/14/2016      Priority: Medium    Microalbuminuria due to type 2 diabetes mellitus (H) 06/14/2016     Priority: Medium    Stage 3 chronic kidney disease (H) 02/12/2016     Priority: Medium     Overview:   Updated per 10/1/17 IMO import      Pulmonary emphysema (H) 08/21/2015     Priority: Medium     Confirmed via PFTs in 8/2015      Type 2 diabetes, HbA1c goal < 7% (H) 07/24/2015     Priority: Medium    Tobacco abuse 07/09/2015     Priority: Medium    Tremor 11/09/2009     Priority: Medium     Formatting of this note might be different from the original.  Shakes head      Breast fibrocystic disorder 02/22/2008     Priority: Medium     Overview:   IMO Update 10/11         10 point ROS of systems including Constitutional, Eyes, Respiratory, Cardiovascular, Gastroenterology, Genitourinary, Integumentary, Muscularskeletal, Psychiatric were all negative except for pertinent positives noted in my HPI.    Current Outpatient Medications   Medication Sig Dispense Refill    Cholecalciferol (VITAMIN D3) 50 MCG (2000 UT) CAPS TAKE 1 CAPSULE BY MOUTH DAILY 90 capsule 0    JARDIANCE 25 MG TABS tablet TAKE 1 TABLET BY MOUTH DAILY 30 tablet 0    Acetaminophen (TYLENOL PO) Take 325 mg by mouth every 6 hours as needed       amLODIPine (NORVASC) 5 MG tablet TAKE 1 TABLET BY MOUTH DAILY 90 tablet 0    aspirin (ASPIRIN LOW DOSE) 81 MG chewable tablet CHEW AND SWALLOW 1 TABLET BY MOUTH DAILY 90 tablet 2    atorvastatin (LIPITOR) 40 MG tablet TAKE 1 TABLET BY MOUTH AT BEDTIME 90 tablet 3    budesonide-formoterol (SYMBICORT) 160-4.5 MCG/ACT Inhaler Inhale 2 puffs into the lungs 2 times daily 1 Inhaler 3    COMBIVENT RESPIMAT  MCG/ACT inhaler INHALE 1 PUFF INTO THE LUNGS 4 TIMES DAILY 4 g 3    Continuous Blood Gluc Sensor (FREESTYLE HANK 2 SENSOR) MISC CHANGE 1 SENSOR EVERY 14 DAYS, USE TO READ BLOOD SUGARS PER MANUFACTURERS INSTRUCTIONS 2 each 3    hydrocortisone (CORTAID) 1 % external cream Apply topically 2 times daily 453.6 g 0    insulin  aspart (NOVOLOG VIAL) 100 UNITS/ML vial To be used with the insulin pump.  TDD: 70 units 30 mL 3    Insulin Disposable Pump (OMNIPOD DASH PODS, GEN 4,) MISC 1 each every 48 hours 15 each 5    insulin glargine U-300 (TOUJEO SOLOSTAR) 300 UNIT/ML (1 units dial) pen Inject 20 Units Subcutaneous At Bedtime 9 mL 1    insulin pen needle (32G X 4 MM) 32G X 4 MM miscellaneous Use 1 pen needles daily 100 each 3    INSULIN PUMP - OUTPATIENT Insulin pump: Omnipod Dash  Updated: 7/5/23  Basal: 1.2 (new)  Total basal: 28.8 units  CR: 12  ISF: 45  BG target: 100  Active insulin: 4 hours      lisinopril (ZESTRIL) 40 MG tablet TAKE 1 TABLET BY MOUTH DAILY 90 tablet 0    nystatin (MYCOSTATIN) 798305 UNIT/GM external powder Apply topically 3 times daily 60 g 3    OneTouch Delica Lancets 33G MISC Inject 1 each Subcutaneous 3 times daily (before meals) 100 each 11    ONETOUCH ULTRA test strip USE TO TEST BLOOD SUGAR 4 TIMES DAILY 100 strip 1    order for DME Women briefs 50 each 1    primidone (MYSOLINE) 50 MG tablet TAKE 1 TABLET BY MOUTH AT BEDTIME 30 tablet 11    urea (GORDONS UREA) 40 % external ointment Apply topically 2 times daily 30 g 0       OBJECTIVE:  There were no vitals taken for this visit.    Physical Exam:  healthy, alert, and no distress  PSYCH: Alert and oriented times 3; coherent speech, normal   rate and volume, able to articulate logical thoughts, able   to abstract reason, no tangential thoughts, no hallucinations   or delusions  His affect is normal  RESP: No cough, no audible wheezing, able to talk in full sentences  Remainder of exam unable to be completed due to telephone visits    ---  This note was written with the assistance of the Dragon voice-dictation technology software. The final document, although reviewed, may contain errors. For corrections, please contact the office.    Total time spent preparing to see the patient, review of chart, obtaining history and physical examination, review of sleep  testing, review of treatment options, education, discussion with patient and documenting in Epic / EMR was 25 minutes.  All time involved was spent on the day of service for the patient (the same day as the patient's appointment).    Anupam Duval MD    Sleep Medicine  [place], [place]  Main Office: [phone]  [facility] [address], [address]  Schedule visits: [phone]  Main Office: [phone]  Fax: [phone]

## 2023-08-24 ENCOUNTER — VIRTUAL VISIT (OUTPATIENT)
Dept: PULMONOLOGY | Facility: OTHER | Age: 58
End: 2023-08-24
Attending: FAMILY MEDICINE
Payer: COMMERCIAL

## 2023-08-24 VITALS — BODY MASS INDEX: 27.31 KG/M2 | HEIGHT: 64 IN | WEIGHT: 160 LBS

## 2023-08-24 DIAGNOSIS — I10 ESSENTIAL HYPERTENSION: ICD-10-CM

## 2023-08-24 DIAGNOSIS — G47.33 OSA (OBSTRUCTIVE SLEEP APNEA): Primary | ICD-10-CM

## 2023-08-24 DIAGNOSIS — J43.9 PULMONARY EMPHYSEMA, UNSPECIFIED EMPHYSEMA TYPE (H): ICD-10-CM

## 2023-08-24 PROCEDURE — 99443 PR PHYSICIAN TELEPHONE EVALUATION 21-30 MIN: CPT | Mod: 93 | Performed by: FAMILY MEDICINE

## 2023-08-24 ASSESSMENT — PAIN SCALES - GENERAL: PAINLEVEL: NO PAIN (0)

## 2023-08-24 NOTE — TELEPHONE ENCOUNTER
JARDIANCE 25 MG TABS tablet 30 tablet 0 7/26/2023     Last Office Visit: 07/19/2023     Future Office visit:    Next 5 appointments (look out 90 days)      Oct 23, 2023  2:00 PM  (Arrive by 1:45 PM)  SHORT with Amberly Whyte NP  [facility] [place] ([facility] [place] ) [address]  [place] [address]  [phone]             Routing refill request to provider for review/approval because:

## 2023-08-24 NOTE — NURSING NOTE
Has patient had flu shot for current/most recent flu season? If so, when? No      Is the patient currently in the state of [place]? YES    Visit mode:TELEPHONE    If the visit is dropped, the patient can be reconnected by: TELEPHONE VISIT: Phone number:   Telephone Information:   Mobile [phone]       Will anyone else be joining the visit? NO  (If patient encounters technical issues they should call [phone]    How would you like to obtain your AVS? Mail a copy    Are changes needed to the allergy or medication list? No    Reason for visit: RECHECK    Leslie TORO

## 2023-08-25 RX ORDER — EMPAGLIFLOZIN 25 MG/1
TABLET, FILM COATED ORAL
Qty: 30 TABLET | Refills: 0 | Status: SHIPPED | OUTPATIENT
Start: 2023-08-25 | End: 2023-09-29

## 2023-08-28 DIAGNOSIS — J43.9 PULMONARY EMPHYSEMA, UNSPECIFIED EMPHYSEMA TYPE (H): ICD-10-CM

## 2023-08-28 NOTE — TELEPHONE ENCOUNTER
Combivent Respimat  MCG/ ACT   Last Written Prescription Date:  1026/2022  Last Fill Quantity: 4g,   # refills: 0  Last Office Visit: 07/19/2023

## 2023-08-29 DIAGNOSIS — I10 ESSENTIAL HYPERTENSION: ICD-10-CM

## 2023-08-29 RX ORDER — IPRATROPIUM BROMIDE AND ALBUTEROL 20; 100 UG/1; UG/1
SPRAY, METERED RESPIRATORY (INHALATION)
Qty: 4 G | Refills: 9 | Status: SHIPPED | OUTPATIENT
Start: 2023-08-29

## 2023-08-30 RX ORDER — LISINOPRIL 40 MG/1
TABLET ORAL
Qty: 90 TABLET | Refills: 3 | Status: SHIPPED | OUTPATIENT
Start: 2023-08-30 | End: 2024-08-27

## 2023-08-30 NOTE — TELEPHONE ENCOUNTER
Lisinopril        Last Written Prescription Date:  06/01/23  Last Fill Quantity: 90,   # refills: 0  Last Office Visit: 07/19/23  Future Office visit:    Next 5 appointments (look out 90 days)      Oct 23, 2023  2:00 PM  (Arrive by 1:45 PM)  SHORT with Amberly Whyte NP  [facility] [place] ([facility] [place] ) [address]  [place] [address]  [phone]             Routing refill request to provider for review/approval because:

## 2023-09-19 ENCOUNTER — ANCILLARY PROCEDURE (OUTPATIENT)
Dept: MAMMOGRAPHY | Facility: OTHER | Age: 58
End: 2023-09-19
Attending: NURSE PRACTITIONER
Payer: COMMERCIAL

## 2023-09-19 DIAGNOSIS — Z12.31 VISIT FOR SCREENING MAMMOGRAM: ICD-10-CM

## 2023-09-19 PROCEDURE — 77067 SCR MAMMO BI INCL CAD: CPT | Mod: TC

## 2023-09-20 ENCOUNTER — MEDICAL CORRESPONDENCE (OUTPATIENT)
Dept: HEALTH INFORMATION MANAGEMENT | Facility: CLINIC | Age: 58
End: 2023-09-20

## 2023-09-20 ENCOUNTER — VIRTUAL VISIT (OUTPATIENT)
Dept: PHARMACY | Facility: PHYSICIAN GROUP | Age: 58
End: 2023-09-20
Payer: COMMERCIAL

## 2023-09-20 ENCOUNTER — TELEPHONE (OUTPATIENT)
Dept: MAMMOGRAPHY | Facility: OTHER | Age: 58
End: 2023-09-20

## 2023-09-20 DIAGNOSIS — J43.9 PULMONARY EMPHYSEMA, UNSPECIFIED EMPHYSEMA TYPE (H): ICD-10-CM

## 2023-09-20 DIAGNOSIS — R25.1 TREMOR: ICD-10-CM

## 2023-09-20 DIAGNOSIS — I10 ESSENTIAL HYPERTENSION: ICD-10-CM

## 2023-09-20 DIAGNOSIS — E78.5 HYPERLIPIDEMIA, UNSPECIFIED HYPERLIPIDEMIA TYPE: ICD-10-CM

## 2023-09-20 DIAGNOSIS — E11.9 TYPE 2 DIABETES, HBA1C GOAL < 7% (H): Primary | ICD-10-CM

## 2023-09-20 PROCEDURE — 99606 MTMS BY PHARM EST 15 MIN: CPT

## 2023-09-20 NOTE — PROGRESS NOTES
Medication Therapy Management (MTM) Encounter    ASSESSMENT:                            Medication Adherence/Access:   No issues identified    Type 2 Diabetes:   Patient is not meeting A1c goal of < 7%. Self monitoring of blood glucose is not at goal of fasting  mg/dL. Patient prefers to defer changes until she sees diabetic education on 10/05/2023.    Hypertension:   Stable.    Hyperlipidemia:   Stable.    Emphysema:   Stable.    Tremor:   Stable.    PLAN:                            Attend your follow-up appointment with diabetic education on 10/05/2023 where changes can be made to your diabetic medication regimen as needed.     Follow-up: October 5th, 2023 with Diabetic Education team at 3:00 PM.    SUBJECTIVE/OBJECTIVE:                          Marly Cannon is a 58 year old female called for a follow-up visit from 03/10/2023.      Reason for visit: Follow-up visit, diabetes.    Allergies/ADRs: Reviewed in chart  Past Medical History: Reviewed in chart  Tobacco: She reports that she has been smoking cigarettes. She started smoking about 44 years ago. She has a 34.00 pack-year smoking history. She has never used smokeless tobacco.Nicotine/Tobacco Cessation Plan:   Information offered: Patient not interested at this time  Alcohol: none  Caffeine: 1 pop per day, 1-2 cups of coffee per day.     Medication Adherence/Access: no issues reported    Diabetes   Type 2 Diabetes:    Jardiance 25 mg daily, Toujeo 20 units at bedtime, Novolog in insulin pump  Aspirin 81 mg daily  Patient is not experiencing side effects.  Patient would like to wait to make changes to her regimen until she sees diabetic education on 10/05/2023.  Blood sugar monitoring: Continuous Glucose Monitor   Fasting - ~160 mg/dL per patient report  Current diabetes symptoms: none  Diet/Exercise: No changes since last visit.      Eye exam is up to date  Foot exam is up to date  Urine Albumin:   Lab Results   Component Value Date    UMALCR 173.17 (H)  01/27/2023      Lab Results   Component Value Date    A1C 13.0 (H) 07/19/2023     Hypertension:   Lisinopril 40 mg daily, amlodipine 5 mg daily  Patient reports no current medication side effects.  Patient has blood pressure monitored by home nursing on Tuesdays. Patient is unsure of readings.   BP Readings from Last 3 Encounters:   07/19/23 112/68   07/05/23 104/72   04/19/23 124/76     Pulse Readings from Last 3 Encounters:   07/19/23 88   07/05/23 103   04/19/23 89     Hyperlipidemia:   Atorvastatin 40 mg daily  Patient reports no current medication side effects.  Recent Labs   Lab Test 07/19/23  1447 07/15/22  0857   CHOL 174 161   HDL 52 50   LDL 66 66   TRIG 278* 224*     Emphysema:   ICS/LABA - Symbicort 160/4.5 mcg - 2 puffs twice daily  Ipratropium and albuterol Respimat (Combivent)  Patient rinses their mouth after using steroid inhaler.    Patient reports no current medication side effects.    Patient reports the following symptoms: none.    Tremor:   Primidone 50 mg daily. Patient reports no issues with this medication, and that it is effective.     Today's Vitals: There were no vitals taken for this visit.  ----------------  I spent 10 minutes with this patient today. I offer these suggestions for consideration by Amberly Whyte NP. A copy of the visit note was provided to the patient's provider(s).    A summary of these recommendations was declined by the patient.    John Chaudhary, PharmD  Medication Therapy Management Pharmacist  [facility]  Office phone: [phone]    Telemedicine Visit Details  Type of service:  Telephone visit  Start Time:  2:01 PM  End Time: 2:11 PM       Medication Therapy Recommendations  No medication therapy recommendations to display

## 2023-09-20 NOTE — PATIENT INSTRUCTIONS
"Recommendations from today's MTM visit:                                                      Attend your follow-up appointment with diabetic education on 10/05/2023 where changes can be made to your diabetic medication regimen as needed.     Follow-up: October 5th, 2023 with Diabetic Education team at 3:00 PM.    It was great speaking with you today.  I value your experience and would be very thankful for your time in providing feedback in our clinic survey. In the next few days, you may receive an email or text message from [employer] with a link to a survey related to your  clinical pharmacist.\"     To schedule another MTM appointment, please call the clinic directly or you may call the MTM scheduling line at [phone] or toll-free at [phone].     My Clinical Pharmacist's contact information:                                                      Please feel free to contact me with any questions or concerns you have.      John Chaudhary, PharmD  Medication Therapy Management Pharmacist  [facility]  Office phone: [phone]     "

## 2023-09-22 DIAGNOSIS — Z53.9 PERSONS ENCOUNTERING HEALTH SERVICES FOR SPECIFIC PROCEDURES, NOT CARRIED OUT: Primary | ICD-10-CM

## 2023-09-22 PROCEDURE — G0179 MD RECERTIFICATION HHA PT: HCPCS | Performed by: NURSE PRACTITIONER

## 2023-09-26 DIAGNOSIS — E11.9 TYPE 2 DIABETES, HBA1C GOAL < 7% (H): ICD-10-CM

## 2023-09-26 NOTE — TELEPHONE ENCOUNTER
Jardiance 25 MG tablets   Last Written Prescription Date:  08/25/2023  Last Fill Quantity: 30,   # refills: 0  Last Office Visit: 07/19/2023

## 2023-09-29 DIAGNOSIS — E11.65 TYPE 2 DIABETES MELLITUS WITH HYPERGLYCEMIA, WITH LONG-TERM CURRENT USE OF INSULIN (H): ICD-10-CM

## 2023-09-29 DIAGNOSIS — Z79.4 TYPE 2 DIABETES MELLITUS WITH HYPERGLYCEMIA, WITH LONG-TERM CURRENT USE OF INSULIN (H): ICD-10-CM

## 2023-09-29 RX ORDER — EMPAGLIFLOZIN 25 MG/1
TABLET, FILM COATED ORAL
Qty: 30 TABLET | Refills: 0 | Status: SHIPPED | OUTPATIENT
Start: 2023-09-29 | End: 2023-10-25

## 2023-10-02 NOTE — TELEPHONE ENCOUNTER
Bud Sensor      Last Written Prescription Date:  8.28.23  Last Fill Quantity: #2,   # refills: 0  Last Office Visit: 7.19.23  Future Office visit:    Next 5 appointments (look out 90 days)      Oct 23, 2023  2:00 PM  (Arrive by 1:45 PM)  SHORT with Amberly Whyte NP  [facility] ([facility] [place] ) [address]  [place] [address]  [phone]             Routing refill request to provider for review/approval because:  Drug not on the [facility], UMP or  Health refill protocol or controlled substance

## 2023-10-05 ENCOUNTER — ALLIED HEALTH/NURSE VISIT (OUTPATIENT)
Dept: EDUCATION SERVICES | Facility: OTHER | Age: 58
End: 2023-10-05
Attending: NURSE PRACTITIONER
Payer: COMMERCIAL

## 2023-10-05 VITALS
RESPIRATION RATE: 16 BRPM | WEIGHT: 201.3 LBS | HEART RATE: 84 BPM | BODY MASS INDEX: 34.37 KG/M2 | HEIGHT: 64 IN | OXYGEN SATURATION: 94 % | SYSTOLIC BLOOD PRESSURE: 128 MMHG | DIASTOLIC BLOOD PRESSURE: 89 MMHG

## 2023-10-05 DIAGNOSIS — Z79.4 TYPE 2 DIABETES MELLITUS WITH HYPERGLYCEMIA, WITH LONG-TERM CURRENT USE OF INSULIN (H): ICD-10-CM

## 2023-10-05 DIAGNOSIS — E11.65 TYPE 2 DIABETES MELLITUS WITH HYPERGLYCEMIA, WITH LONG-TERM CURRENT USE OF INSULIN (H): ICD-10-CM

## 2023-10-05 DIAGNOSIS — F17.200 NICOTINE DEPENDENCE, UNCOMPLICATED, UNSPECIFIED NICOTINE PRODUCT TYPE: Primary | ICD-10-CM

## 2023-10-05 PROCEDURE — 99214 OFFICE O/P EST MOD 30 MIN: CPT | Mod: 25 | Performed by: NURSE PRACTITIONER

## 2023-10-05 PROCEDURE — 95251 CONT GLUC MNTR ANALYSIS I&R: CPT | Performed by: NURSE PRACTITIONER

## 2023-10-05 PROCEDURE — G0463 HOSPITAL OUTPT CLINIC VISIT: HCPCS

## 2023-10-05 RX ORDER — INSULIN ASPART 100 [IU]/ML
INJECTION, SOLUTION INTRAVENOUS; SUBCUTANEOUS
Qty: 30 ML | Refills: 3 | Status: SHIPPED | OUTPATIENT
Start: 2023-10-05 | End: 2024-06-27

## 2023-10-05 ASSESSMENT — PAIN SCALES - GENERAL: PAINLEVEL: NO PAIN (0)

## 2023-10-05 NOTE — PATIENT INSTRUCTIONS
Continue working on healthy eating and moving (start low and slow, work up to 30 min, 5x/week)    BG goals:  Fasting and before meals <130, >70  2 hour after eating <180    We only need 1/2 of these numbers to be within target then your A1c will be within target    Medication changes   Keep working on entering 20 grams with meals or using the preset boluses.      Follow up   Download when you see Amberly Whyte NP   2 months with me    Call me sooner if any problems/concerns and/or questions develop including consistent low BGs <70 or consistent high BGs >200  [phone] (Unit Coordinator)    [phone] (Anna Nurse)    Smoking:   Keep working on quitting

## 2023-10-09 NOTE — PROGRESS NOTES
SUBJECTIVE:  Marly Cannon, 58 year old, female presents with the following Chief Complaint(s) with HPI to follow:  Chief Complaint   Patient presents with    Diabetes        Diabetes Follow-up  Patient is checking blood sugars: >4x/day using her personal CGM ([employer]style Bud).  Results:    Date: 9/22 to 10/5/23  Glucose management indicator: 9.4%    Average glucose: 254    Glucose range  Very low (<54): 0  low (<70): 0  In target range (): 24%  High (>180): 28%  Very high (>250): 48%      Symptoms of hypoglycemia (low blood sugar): no  Paresthesias (numbness or burning in feet) or sores: no, no sores  Diabetic eye exam within the last year: UTD  Breakfast eaten regularly: most of the time  Patient counting carbs: Yes  Exercising: depends on the weather          HPI:  Marly's here today for the follow up regarding her Diabetes mellitus, Type 2.    A1c trend:  Lab Results   Component Value Date    A1C 13.0 07/19/2023    A1C 15.1 04/19/2023    A1C 15.1 01/17/2023    A1C 11.4 10/17/2022    A1C 10.4 07/15/2022    A1C >14.0 05/10/2021    A1C 10.7 02/10/2021    A1C 7.6 11/09/2020    A1C 7.9 08/07/2020    A1C 8.1 05/04/2020     Current Diabetes medication:   1. Omnipod dash, using Novolog  2. Jardiance 25 mg daily--not taking  Statin use: yes, simvastatin 20 mg daily  ASA: yes, 81 mg daily    Marly's here a download of her CGM and insulin pump with possible diabetic medication adjustments.   Reports the following:  No issues with the Freestyle Bud   She's wearing her Omnipod Dash--no issues.      Otherwise, denies any new health concerns.     Weight trend   Wt Readings from Last 4 Encounters:   10/05/23 91.3 kg (201 lb 4.8 oz)   08/24/23 72.6 kg (160 lb)   07/19/23 90.7 kg (200 lb)   07/05/23 89.7 kg (197 lb 12.8 oz)       Patient Active Problem List   Diagnosis    Type 2 diabetes, HbA1c goal < 7% (H)    ACP (advance care planning)    Stage 3 chronic kidney disease (H)    Pulmonary emphysema (H)     Hyperparathyroidism due to renal insufficiency (H24)    Vitamin D deficiency    Intellectual disability    Breast fibrocystic disorder    Tobacco abuse    Microalbuminuria due to type 2 diabetes mellitus (H)    H/O colonoscopy    [place] cardiac risk <10% in next 10 years    Tubular adenoma of colon    Hyperlipidemia, unspecified hyperlipidemia type    Essential hypertension    Callus of foot    Memory disturbance    ADEEL (obstructive sleep apnea)    Tremor    Diabetic polyneuropathy associated with diabetes mellitus due to underlying condition (H)    Urinary incontinence, unspecified type       History reviewed. No pertinent past medical history.    Past Surgical History:   Procedure Laterality Date    BIOPSY BREAST Left 02/14/2008    patient states no bx, Clip in left breast/ stereo bxc 2-- no path in [facility] that far back    COLONOSCOPY N/A 08/20/2015    Procedure: COLONOSCOPY;  Surgeon: Gentry Porter MD;  Location: [facility]    COLONOSCOPY N/A 09/21/2018    Procedure: COLONOSCOPY;  COLONOSCOPY WITH POLYPECTOMY;  Surgeon: Gentry Porter MD;  Location: [facility]       Family History   Problem Relation Age of Onset    Diabetes Mother     Diabetes Father     Hypertension Brother     Diabetes Sister        Social History     Tobacco Use    Smoking status: Every Day     Packs/day: 1.00     Years: 34.00     Pack years: 34.00     Types: Cigarettes     Start date: 1/1/1979    Smokeless tobacco: Never    Tobacco comments:     Declined QP 05/13/2020   Substance Use Topics    Alcohol use: No     Alcohol/week: 0.0 standard drinks of alcohol       Current Outpatient Medications   Medication Sig Dispense Refill    Continuous Blood Gluc Sensor (FREESTYLE HANK 2 SENSOR) MISC CHANGE 1 SENSOR EVERY 14 DAYS, USE TO READ BLOOD SUGARS PER MANUFACTURERS INSTRUCTIONS 2 each 11    insulin aspart (NOVOLOG VIAL) 100 UNITS/ML vial To be used with the insulin pump.  TDD: 70 units 30 mL 3    Insulin Disposable Pump (OMNIPOD DASH  PODS, GEN 4,) MISC 1 each every 48 hours 15 each 5    insulin glargine U-300 (TOUJEO SOLOSTAR) 300 UNIT/ML (1 units dial) pen Inject 20 Units Subcutaneous At Bedtime 9 mL 1    insulin pen needle (32G X 4 MM) 32G X 4 MM miscellaneous Use 1 pen needles daily 100 each 3    Acetaminophen (TYLENOL PO) Take 325 mg by mouth every 6 hours as needed       amLODIPine (NORVASC) 5 MG tablet TAKE 1 TABLET BY MOUTH DAILY 90 tablet 0    aspirin (ASPIRIN LOW DOSE) 81 MG chewable tablet CHEW AND SWALLOW 1 TABLET BY MOUTH DAILY 90 tablet 2    atorvastatin (LIPITOR) 40 MG tablet TAKE 1 TABLET BY MOUTH AT BEDTIME 90 tablet 3    budesonide-formoterol (SYMBICORT) 160-4.5 MCG/ACT Inhaler Inhale 2 puffs into the lungs 2 times daily 1 Inhaler 3    Cholecalciferol (VITAMIN D3) 50 MCG (2000 UT) CAPS TAKE 1 CAPSULE BY MOUTH DAILY 90 capsule 0    hydrocortisone (CORTAID) 1 % external cream Apply topically 2 times daily 453.6 g 0    INSULIN PUMP - OUTPATIENT Insulin pump: Omnipod Dash  Updated: 7/5/23  Basal: 1.2 (new)  Total basal: 28.8 units  CR: 12  ISF: 45  BG target: 100  Active insulin: 4 hours      ipratropium-albuterol (COMBIVENT RESPIMAT)  MCG/ACT inhaler INHALE 1 PUFF INTO THE LUNGS 4 TIMES DAILY 4 g 9    JARDIANCE 25 MG TABS tablet TAKE 1 TABLET BY MOUTH DAILY 30 tablet 0    lisinopril (ZESTRIL) 40 MG tablet TAKE 1 TABLET BY MOUTH DAILY 90 tablet 3    nystatin (MYCOSTATIN) 375899 UNIT/GM external powder Apply topically 3 times daily 60 g 3    OneTouch Delica Lancets 33G MISC Inject 1 each Subcutaneous 3 times daily (before meals) 100 each 11    ONETOUCH ULTRA test strip USE TO TEST BLOOD SUGAR 4 TIMES DAILY 100 strip 1    order for DME Women briefs 50 each 1    primidone (MYSOLINE) 50 MG tablet TAKE 1 TABLET BY MOUTH AT BEDTIME 30 tablet 11    urea (GORDONS UREA) 40 % external ointment Apply topically 2 times daily 30 g 0       No Known Allergies    REVIEW OF SYSTEMS  Skin: negative  Eyes: negative, wears glasses   "  Ears/Nose/Throat: negative  Respiratory: No shortness of breath or hemoptysis; smoker's cough; hx of sleep apnea  Cardiovascular: negative  Gastrointestinal: negative  Genitourinary: negative  Musculoskeletal: negative; hx of left shoulder pain and left knee--depends on the weather   Neurologic: negative  Psychiatric: negative  Hematologic/Lymphatic/Immunologic: negative  Endocrine: positive for diabetes     OBJECTIVE:  /89   Pulse 84   Resp 16   Ht 1.626 m (5' 4\")   Wt 91.3 kg (201 lb 4.8 oz)   SpO2 94%   BMI 34.55 kg/m    Constitutional: alert and no distress  Respiratory:  Good diaphragmatic excursion.   Musculoskeletal: extremities normal- no gross deformities noted and gait normal  Skin: no suspicious lesions or rashes to visible skin  Psychiatric: mentation appears normal and affect normal/bright      LABS  No results found for any visits on 10/05/23.      ASSESSMENT / PLAN:  (F17.200) Nicotine dependence, uncomplicated, unspecified nicotine product type  (primary encounter diagnosis)  Comment: noted, encouraged quitting  Plan:   Spoke with patient regarding options for smoking cessation.  Various pharmacologic options and behavioral techniques were reviewed.  The relative effectiveness as well as risks and benefits were reviewed with patient.  Patient is interested in: No interventions    Smoking Cessation    Let us know when you are ready to quit.     (E11.65,  Z79.4) Type 2 diabetes mellitus with hyperglycemia, with long-term current use of insulin (H)  Comment: noted CGM download    Insulin pump: Omnipod Clarence  Updated: 10/5/23  Basal: 1.2   Total basal: 28.8 units  CR: 12  ISF: 45  BG target: 100  Active insulin: 4 hours    Plan: Continuous Blood Gluc Sensor (FREESTYLE HANK 2        SENSOR) MISC, insulin aspart (NOVOLOG VIAL) 100        UNITS/ML vial, GLUCOSE MONITOR, 72 HOUR, PHYS         INTERP          Education focused on how to enter for carb boluses.      Keep taking your " Jardiance      Follow up  As scheduled.      Call sooner if any issues    Patient Instructions   Continue working on healthy eating and moving (start low and slow, work up to 30 min, 5x/week)    BG goals:  Fasting and before meals <130, >70  2 hour after eating <180    We only need 1/2 of these numbers to be within target then your A1c will be within target    Medication changes   Keep working on entering 20 grams with meals or using the preset boluses.      Follow up   Download when you see Amberly Whyte NP   2 months with me    Call me sooner if any problems/concerns and/or questions develop including consistent low BGs <70 or consistent high BGs >200  [phone] (Unit Coordinator)    [phone] (Anna Nurse)    Smoking:   Keep working on quitting            Time: 30 minutes  Barrier: see [facility]  Willingness to learn: accepting    Courtney PINTO [facility]  Diabetes and Wound Care    Cc: Amberly Whyte NP    30 minutes was spent with patient.    All of this time was spent on counseling patient regarding illness, medication and/or treatment options, coordinating further cares and follow ups that are needed along with resource material that will be helpful in the treatment of these issues.       With the electronic record, we can now more quickly and easily track our patient diabetic goals. Our diabetes clinical review is in progress and these are the indicators we are monitoring for good diabetes health.     1.) HbA1C less than 7 (measurement of your average blood sugars)  2.) Blood Pressure less than 140/80  3.) LDL less than 100 (bad cholesterol)  4.) HbA1C is checked in the last 6 months and below 7% (more frequently if not at goal or adjusting medications)  5.) LDL is checked in the last 12 months (more frequently if not at goal or adjusting medications)  6.) Taking one baby aspirin daily (unless otherwise instructed)  7.) No tobacco use  8) Statin use     You have achieved 6 out of 8 of these and I am  encouraging you to come in and get tuned up to achieve 8 out of 8!  Here is what you have achieved so far in my goals for you:  1.) HbA1C  less than 7:                              NO  Your last  HbA1C :  Lab Results   Component Value Date    A1C 13.0 07/19/2023    A1C 15.1 04/19/2023    A1C 15.1 01/17/2023    A1C 11.4 10/17/2022    A1C 10.4 07/15/2022    A1C >14.0 05/10/2021    A1C 10.7 02/10/2021    A1C 7.6 11/09/2020    A1C 7.9 08/07/2020    A1C 8.1 05/04/2020     2.) Blood Pressure less than 140/80:       YES      Your last                       BP Readings from Last 1 Encounters:   10/05/23 128/89     3.) LDL less than 100:                              YES      Your last     LDL Cholesterol Calculated   Date Value Ref Range Status   07/19/2023 66 <=100 mg/dL Final   11/09/2020 74 <100 mg/dL Final     Comment:     Desirable:       <100 mg/dl       4.) Checked HbA1C in the past 6 months: YES      5.) Checked LDL in the past 12 months:    YES      6.) Taking one aspirin daily:                       YES     7.) No tobacco use:                                        NO   8.) Statin use      YES

## 2023-10-24 DIAGNOSIS — E11.9 TYPE 2 DIABETES, HBA1C GOAL < 7% (H): ICD-10-CM

## 2023-10-24 DIAGNOSIS — E55.9 VITAMIN D DEFICIENCY: ICD-10-CM

## 2023-10-24 NOTE — TELEPHONE ENCOUNTER
Jardiance       Last Written Prescription Date:  9/29/2023  Last Fill Quantity: 30,   # refills: 0  Last Office Visit: 7/19/2023      Vit D       Last Written Prescription Date:  7/26/2023  Last Fill Quantity: 90,   # refills: 0  Last Office Visit: 7/19/2023  Future Office visit:    Next 5 appointments (look out 90 days)      Nov 15, 2023  2:00 PM  (Arrive by 1:45 PM)  SHORT with Amberly Whyte NP  [facility] [place] ([facility] [place] ) [address]  [place] [address]  [phone]

## 2023-10-25 RX ORDER — ACETAMINOPHEN 160 MG
TABLET,DISINTEGRATING ORAL
Qty: 90 CAPSULE | Refills: 0 | Status: SHIPPED | OUTPATIENT
Start: 2023-10-25 | End: 2024-01-30

## 2023-10-25 RX ORDER — EMPAGLIFLOZIN 25 MG/1
TABLET, FILM COATED ORAL
Qty: 30 TABLET | Refills: 0 | Status: SHIPPED | OUTPATIENT
Start: 2023-10-25 | End: 2023-11-30

## 2023-11-14 NOTE — PROGRESS NOTES
Assessment & Plan     Type 2 diabetes, HbA1c goal < 7% (H)  A1c in process. Will notify patient of the results when available and intervene accordingly. Most likely will be high.     Difficult to manage as non-complaint with medications. Will continue f/up with the diabetic center.     She does have mild cognitive impairement, but we have sent her for neuropsych testing and no major concerns were noted. She lives in her own and seems to manage.      On statin. BP at goal. Eye exam UTD.     Will see her back in 3 months.     - Hemoglobin A1c  - Basic metabolic panel  - Albumin Random Urine Quantitative with Creat Ratio    Microalbuminuria due to type 2 diabetes mellitus (H)  On ACE. Will continue.     Essential hypertension  Well controlled. Continue current medications. Encouraged daily exercise and a low sodium diet. Recommended checking BP's 2x/wk, call the clinic if consistantly s>140 or d>90. Follow up in 3 months.     Hyperlipidemia, unspecified hyperlipidemia type  Tolerating statin. Will continue. Lipids controlled in 7/2023. AST and ALT were normal at this time.     Stage 3 chronic kidney disease, unspecified whether stage 3a or 3b CKD (H)  Will reassess today. Avoids NSAIDs.     Pulmonary emphysema, unspecified emphysema type (H)  Controlled. Will continue the Combivent. Declines to try another inhaler. Per sleep medicine, it was also recommended she see pulmonary, but she declines.     ADEEL (obstructive sleep apnea)  Follows with sleep medicine. It was recommended she try the bilevel PAP with supplemental oxygen, but she declines. Will think about it.     Hypercalcemia  Will repeat today with additional lab testing. Will notify patient of the results when available and intervene accordingly. Lung scan screening UTD.     - Basic metabolic panel  - PTH-Related Protein (PTH-RP) ([facility])  - Ionized Calcium    Tobacco abuse  Cessation encouraged.     Elevated hemoglobin (H24)  CBC in process. Will notify  "patient of the results when available and intervene accordingly. On asa. Most likely due to chronic hypoxia, COPD, and smoking. Smoking cessation encouraged. It was also recommended she use bilevel pap with supplemental oxygen, but she declines. JAK2 mutation negative. I therefore so not feel she needs to see hematology.     - CBC with platelets and differential    Special screening for malignant neoplasms, colon  - Colonoscopy Screening  Referral; Future-Due for imaging.     }     BMI:   Estimated body mass index is 34.5 kg/m  as calculated from the following:    Height as of 10/5/23: 1.626 m (5' 4\").    Weight as of this encounter: 91.2 kg (201 lb).   Weight management plan: Discussed healthy diet and exercise guidelines        Amberly Whyte NP  [facility] Luke is a 58 year old, presenting for the following health issues:  Hypertension, Diabetes, Lipids, and COPD      HPI     Diabetes Follow-up    How often are you checking your blood sugar? One time daily  What time of day are you checking your blood sugars (select all that apply)?   morning  Have you had any blood sugars above 200?  No, \"not really\"  Have you had any blood sugars below 70?  No  What symptoms do you notice when your blood sugar is low?  None  What concerns do you have today about your diabetes? None   Do you have any of these symptoms? (Select all that apply)  No numbness or tingling in feet.  No redness, sores or blisters on feet.  No complaints of excessive thirst.  No reports of blurry vision.  No significant changes to weight.  A1C was 13.0 on 7/19/23. Non-complaint with medications.   Follows with the diabetic center. Last visit with them was on 10/5/23. Note was reviewed. Average glucose was 254 which had improved.   Taking Novolog, Toujeo, and Jardiance without side effects. Metformin caused an upset stomach. Is often noncomplaint with her medications. Does not take as prescribed.   On " asa.   She denies chest pain, dizziness, syncope, or palpitations. Some chronic shortness of breath related to her COPD. Feels this has continued to improved. Denies any recent shortness of breath.   Walking less with Winter coming.   On ACE.   She does have chronic kidney disease. Typically avoids NSAIDs.   No abdominal pain. No nausea or vomiting.     Due for several vaccines. Declines.     She does smoke, currently smoking 1 ppd. No interest in quitting.      Hyperlipidemia Follow-Up    Are you regularly taking any medication or supplement to lower your cholesterol?   Yes- Lipitor 40 mg  Are you having muscle aches or other side effects that you think could be caused by your cholesterol lowering medication?  No  Denies chest pain, shortness of breath, dizziness, or syncope. Chronic shortness of breath related to COPD.       Hypertension Follow-up    Do you check your blood pressure regularly outside of the clinic? Yes   Are you following a low salt diet? No  Are your blood pressures ever more than 140 on the top number (systolic) OR more   than 90 on the bottom number (diastolic), for example 140/90? Yes  Using lisinopril 40 mg with amlodipine 5 mg. No side effects.      Elevated Hgb, on asa. Likely from COPD and ADEEL. Smoking cessation encouraged. She also has severe ADEEL with nearly continuous nocturnal hypoxia. Follows with Dr. Duval. Last seen on 8/24/23. A bilevel PAP with supplemental oxygen was recommended, but she declined. Note was reviewed.  It was also recommended she see pulmonary, but she declined. JAK2 mutation was negative. Will recheck today.     Due for a colonoscopy. Willing to get.     Calcium intermittently high. Will recheck today.       BP Readings from Last 2 Encounters:   11/15/23 128/84   10/05/23 128/89     Hemoglobin A1C (%)   Date Value   07/19/2023 13.0 (H)   04/19/2023 15.1 (H)   05/10/2021 >14.0 (H)   02/10/2021 10.7 (H)     LDL Cholesterol Calculated (mg/dL)   Date Value  "  07/19/2023 66   07/15/2022 66   11/09/2020 74   02/04/2020 49         COPD Follow-Up  Overall, how are your COPD symptoms since your last clinic visit?  No change  How much fatigue or shortness of breath do you have when you are walking?  Same as usual  How much shortness of breath do you have when you are resting?  None  How often do you cough? Rarely  Have you noticed any change in your sputum/phlegm?  no  Have you experienced a recent fever? No  Please describe how far you can walk without stopping to rest:  2-5 blocks  How many flights of stairs are you able to walk up without stopping?  1  Have you had any Emergency Room Visits, Urgent Care Visits, or Hospital Admissions because of your COPD since your last office visit?  No  Has not worsened. Using Combivent 4 times daily. Feels this helps. Declines to change inhalers.   Continues to smoke 1 ppd.     History   Smoking Status    Every Day    Packs/day: 1.00    Years: 34.00    Types: Cigarettes    Start date: 1/1/1979   Smokeless Tobacco    Never     No results found for: \"FEV1\", \"MHT7QVG\"        Review of Systems   Constitutional, HEENT, cardiovascular, pulmonary, gi and gu systems are negative, except as otherwise noted.      Objective    /84   Pulse 80   Temp 98.7  F (37.1  C) (Tympanic)   Wt 91.2 kg (201 lb)   SpO2 91%   BMI 34.50 kg/m    Body mass index is 34.5 kg/m .  Physical Exam   GENERAL: alert, no distress, and obese, mild cognitive impairment noted  EYES: Eyes grossly normal to inspection, PERRL and conjunctivae and sclerae normal  HENT: ear canals and TM's normal, nose and mouth without ulcers or lesions  NECK: no adenopathy, no asymmetry, masses, or scars and thyroid normal to palpation  RESP: lungs diminished throughout  CV: regular rate and rhythm, no peripheral edema and peripheral pulses strong  ABDOMEN: soft, nontender, obese, no masses and bowel sounds normal  MS: no gross musculoskeletal defects noted, no edema  NEURO: Normal " strength and tone, mentation intact and speech normal  PSYCH: mentation appears normal, affect normal/bright  Diabetic foot exam: normal DP and PT pulses, no trophic changes or ulcerative lesions, and normal sensory exam    Labs in process

## 2023-11-15 ENCOUNTER — TELEPHONE (OUTPATIENT)
Dept: FAMILY MEDICINE | Facility: OTHER | Age: 58
End: 2023-11-15

## 2023-11-15 ENCOUNTER — OFFICE VISIT (OUTPATIENT)
Dept: FAMILY MEDICINE | Facility: OTHER | Age: 58
End: 2023-11-15
Attending: NURSE PRACTITIONER
Payer: COMMERCIAL

## 2023-11-15 VITALS
DIASTOLIC BLOOD PRESSURE: 84 MMHG | HEART RATE: 80 BPM | TEMPERATURE: 98.7 F | OXYGEN SATURATION: 91 % | BODY MASS INDEX: 34.5 KG/M2 | WEIGHT: 201 LBS | SYSTOLIC BLOOD PRESSURE: 128 MMHG

## 2023-11-15 DIAGNOSIS — E83.52 HYPERCALCEMIA: ICD-10-CM

## 2023-11-15 DIAGNOSIS — I10 ESSENTIAL HYPERTENSION: ICD-10-CM

## 2023-11-15 DIAGNOSIS — E78.5 HYPERLIPIDEMIA, UNSPECIFIED HYPERLIPIDEMIA TYPE: ICD-10-CM

## 2023-11-15 DIAGNOSIS — R80.9 MICROALBUMINURIA DUE TO TYPE 2 DIABETES MELLITUS (H): ICD-10-CM

## 2023-11-15 DIAGNOSIS — N18.30 STAGE 3 CHRONIC KIDNEY DISEASE, UNSPECIFIED WHETHER STAGE 3A OR 3B CKD (H): ICD-10-CM

## 2023-11-15 DIAGNOSIS — G47.33 OSA (OBSTRUCTIVE SLEEP APNEA): ICD-10-CM

## 2023-11-15 DIAGNOSIS — Z12.11 SPECIAL SCREENING FOR MALIGNANT NEOPLASMS, COLON: ICD-10-CM

## 2023-11-15 DIAGNOSIS — D58.2 ELEVATED HEMOGLOBIN (H): ICD-10-CM

## 2023-11-15 DIAGNOSIS — J43.9 PULMONARY EMPHYSEMA, UNSPECIFIED EMPHYSEMA TYPE (H): ICD-10-CM

## 2023-11-15 DIAGNOSIS — Z72.0 TOBACCO ABUSE: ICD-10-CM

## 2023-11-15 DIAGNOSIS — G47.34 NOCTURNAL HYPOXIA: ICD-10-CM

## 2023-11-15 DIAGNOSIS — G47.33 OSA (OBSTRUCTIVE SLEEP APNEA): Primary | ICD-10-CM

## 2023-11-15 DIAGNOSIS — E11.29 MICROALBUMINURIA DUE TO TYPE 2 DIABETES MELLITUS (H): ICD-10-CM

## 2023-11-15 DIAGNOSIS — E11.9 TYPE 2 DIABETES, HBA1C GOAL < 7% (H): Primary | ICD-10-CM

## 2023-11-15 LAB
ANION GAP SERPL CALCULATED.3IONS-SCNC: 9 MMOL/L (ref 7–15)
BASOPHILS # BLD AUTO: 0.1 10E3/UL (ref 0–0.2)
BASOPHILS NFR BLD AUTO: 1 %
BUN SERPL-MCNC: 12.7 MG/DL (ref 8–23)
CA-I BLD-MCNC: 4.7 MG/DL (ref 4.4–5.2)
CALCIUM SERPL-MCNC: 9.8 MG/DL (ref 8.8–10.2)
CHLORIDE SERPL-SCNC: 97 MMOL/L (ref 98–107)
CREAT SERPL-MCNC: 1.15 MG/DL (ref 0.51–0.95)
CREAT UR-MCNC: 29 MG/DL
DEPRECATED HCO3 PLAS-SCNC: 28 MMOL/L (ref 22–29)
EGFRCR SERPLBLD CKD-EPI 2021: 54 ML/MIN/1.73M2
EOSINOPHIL # BLD AUTO: 0.2 10E3/UL (ref 0–0.7)
EOSINOPHIL NFR BLD AUTO: 3 %
ERYTHROCYTE [DISTWIDTH] IN BLOOD BY AUTOMATED COUNT: 12.8 % (ref 10–15)
EST. AVERAGE GLUCOSE BLD GHB EST-MCNC: 269 MG/DL
GLUCOSE SERPL-MCNC: 455 MG/DL (ref 70–99)
HBA1C MFR BLD: 11 %
HCT VFR BLD AUTO: 51.9 % (ref 35–47)
HGB BLD-MCNC: 17.3 G/DL (ref 11.7–15.7)
IMM GRANULOCYTES # BLD: 0 10E3/UL
IMM GRANULOCYTES NFR BLD: 0 %
LYMPHOCYTES # BLD AUTO: 2 10E3/UL (ref 0.8–5.3)
LYMPHOCYTES NFR BLD AUTO: 24 %
MCH RBC QN AUTO: 29.7 PG (ref 26.5–33)
MCHC RBC AUTO-ENTMCNC: 33.3 G/DL (ref 31.5–36.5)
MCV RBC AUTO: 89 FL (ref 78–100)
MICROALBUMIN UR-MCNC: 74 MG/L
MICROALBUMIN/CREAT UR: 255.17 MG/G CR (ref 0–25)
MONOCYTES # BLD AUTO: 0.6 10E3/UL (ref 0–1.3)
MONOCYTES NFR BLD AUTO: 7 %
NEUTROPHILS # BLD AUTO: 5.4 10E3/UL (ref 1.6–8.3)
NEUTROPHILS NFR BLD AUTO: 65 %
NRBC # BLD AUTO: 0 10E3/UL
NRBC BLD AUTO-RTO: 0 /100
PLATELET # BLD AUTO: 258 10E3/UL (ref 150–450)
POTASSIUM SERPL-SCNC: 4.4 MMOL/L (ref 3.4–5.3)
RBC # BLD AUTO: 5.82 10E6/UL (ref 3.8–5.2)
SODIUM SERPL-SCNC: 134 MMOL/L (ref 135–145)
WBC # BLD AUTO: 8.4 10E3/UL (ref 4–11)

## 2023-11-15 PROCEDURE — 36415 COLL VENOUS BLD VENIPUNCTURE: CPT | Mod: ZL | Performed by: NURSE PRACTITIONER

## 2023-11-15 PROCEDURE — G0463 HOSPITAL OUTPT CLINIC VISIT: HCPCS

## 2023-11-15 PROCEDURE — 82542 COL CHROMOTOGRAPHY QUAL/QUAN: CPT | Mod: ZL | Performed by: NURSE PRACTITIONER

## 2023-11-15 PROCEDURE — 82570 ASSAY OF URINE CREATININE: CPT | Mod: ZL | Performed by: NURSE PRACTITIONER

## 2023-11-15 PROCEDURE — 85004 AUTOMATED DIFF WBC COUNT: CPT | Mod: ZL | Performed by: NURSE PRACTITIONER

## 2023-11-15 PROCEDURE — 99214 OFFICE O/P EST MOD 30 MIN: CPT | Performed by: NURSE PRACTITIONER

## 2023-11-15 PROCEDURE — 83036 HEMOGLOBIN GLYCOSYLATED A1C: CPT | Mod: ZL | Performed by: NURSE PRACTITIONER

## 2023-11-15 PROCEDURE — 82310 ASSAY OF CALCIUM: CPT | Mod: ZL | Performed by: NURSE PRACTITIONER

## 2023-11-15 PROCEDURE — 82330 ASSAY OF CALCIUM: CPT | Mod: ZL | Performed by: NURSE PRACTITIONER

## 2023-11-15 NOTE — TELEPHONE ENCOUNTER
Spoke with patient, she would like to see pulmonary, but does not drive. Does pulmonary come up to our area at all?

## 2023-11-15 NOTE — TELEPHONE ENCOUNTER
----- Message from Amberly Whyte NP sent at 11/15/2023  3:05 PM CST -----  Notify pt.     -Sodium low, but stable. Most likely from her uncontrolled diabetes. Will monitor.   -Kidney function decreased, but stable. Should continue to avoid NSAIDs.  -Calcium now normal. Please cancel the PTH lab.   -A1C still high-f/up with the diabetic center  -Hgb still high. Most likely from COPD and ADEEL-I would still recommend she see pulmonary. If willing, pend referral to Yoana Miguel.

## 2023-11-16 ENCOUNTER — TELEPHONE (OUTPATIENT)
Dept: EDUCATION SERVICES | Facility: OTHER | Age: 58
End: 2023-11-16

## 2023-11-16 NOTE — TELEPHONE ENCOUNTER
Pt called her Omnipod pod is beeping and will not stop. Pt changed her pod today. I advised the pt she has to stop the old pod and start the new one. Her pdm is stating she does not have an active pod on. I spoke to Courtney Chaudhary and called the pt back and notified her to contact Customer Care for help.

## 2023-11-17 ENCOUNTER — TELEPHONE (OUTPATIENT)
Dept: FAMILY MEDICINE | Facility: OTHER | Age: 58
End: 2023-11-17

## 2023-11-17 ENCOUNTER — HOSPITAL ENCOUNTER (OUTPATIENT)
Facility: HOSPITAL | Age: 58
End: 2023-11-17
Attending: INTERNAL MEDICINE | Admitting: INTERNAL MEDICINE
Payer: COMMERCIAL

## 2023-11-17 DIAGNOSIS — Z12.11 ENCOUNTER FOR SCREENING COLONOSCOPY: Primary | ICD-10-CM

## 2023-11-17 RX ORDER — BISACODYL 5 MG/1
10 TABLET, DELAYED RELEASE ORAL ONCE
Qty: 2 TABLET | Refills: 0 | Status: SHIPPED | OUTPATIENT
Start: 2023-11-17 | End: 2023-11-17

## 2023-11-17 NOTE — TELEPHONE ENCOUNTER
Screening Questions for the Scheduling of Screening Colonoscopies     (If Colonoscopy is diagnostic, Provider should review the chart before scheduling.)    Are you younger than 50 or older than 80?  No    Do you take aspirin or fish oil?  No (if yes, tell patient to stop 1 week prior to Colonoscopy)    Do you take warfarin (Coumadin), clopidogrel (Plavix), apixaban (Eliquis), dabigatram (Pradaxa), rivaroxaban (Xarelto) or any blood thinner? No    Do you use oxygen at home?  No    Do you have kidney disease? No    Are you on dialysis? No    Have you had a stroke or heart attack in the last year? No    Have you had a stent in your heart or any blood vessel in the last year? No    Have you had a transplant of any organ?  No    Have you had a colonoscopy or upper endoscopy (EGD) before?  YES. Date-.         Date of scheduled Colonoscopy: 1/24/24    Provider: Dr. Zayas     [facility]

## 2023-11-17 NOTE — TELEPHONE ENCOUNTER
Patient scheduled screening colonoscopy 01/24/24 with bill Carlisle bowel prep ordered and instructions mailed today.  Patient does need pre-op for anesthesia and she has been made aware.

## 2023-11-17 NOTE — TELEPHONE ENCOUNTER
Called patient back. She is going to contact a transportation place to see if they can bring her. She would like to continue with the referral to pulmonary. It has been pended.

## 2023-11-21 DIAGNOSIS — Z53.9 PERSONS ENCOUNTERING HEALTH SERVICES FOR SPECIFIC PROCEDURES, NOT CARRIED OUT: Primary | ICD-10-CM

## 2023-11-21 PROCEDURE — G0179 MD RECERTIFICATION HHA PT: HCPCS | Performed by: NURSE PRACTITIONER

## 2023-11-22 LAB — PTH RELATED PROT SERPL-SCNC: <2 PMOL/L

## 2023-11-24 ENCOUNTER — MEDICAL CORRESPONDENCE (OUTPATIENT)
Dept: HEALTH INFORMATION MANAGEMENT | Facility: CLINIC | Age: 58
End: 2023-11-24
Payer: COMMERCIAL

## 2023-11-28 DIAGNOSIS — I10 ESSENTIAL HYPERTENSION: ICD-10-CM

## 2023-11-28 DIAGNOSIS — E11.9 TYPE 2 DIABETES, HBA1C GOAL < 7% (H): ICD-10-CM

## 2023-11-30 RX ORDER — EMPAGLIFLOZIN 25 MG/1
TABLET, FILM COATED ORAL
Qty: 30 TABLET | Refills: 5 | Status: SHIPPED | OUTPATIENT
Start: 2023-11-30 | End: 2024-05-28

## 2023-11-30 NOTE — TELEPHONE ENCOUNTER
Amlodipine (NORVASC) 5 mg tab      Last Written Prescription Date:  8/30/23  Last Fill Quantity: 90,   # refills: 0  Last Office Visit: 11/15/23  Future Office visit:    Next 5 appointments (look out 90 days)      Victorino 15, 2024  2:00 PM  (Arrive by 1:45 PM)  Pre-Op physical with [facility] [place] ([facility] [place] ) [address]  [place] [address]  [phone]     Feb 16, 2024  2:00 PM  (Arrive by 1:45 PM)  SHORT with Amberly Whyte NP  [facility] [place] ([facility] [place] ) [address]  [place] [address]  [phone]             JARDIANCE 25 mg tab      Last Written Prescription Date:  10/25/23  Last Fill Quantity: 30,   # refills: 0  Last Office Visit: 11/15/23  Future Office visit:    Next 5 appointments (look out 90 days)      Victorino 15, 2024  2:00 PM  (Arrive by 1:45 PM)  Pre-Op physical with [facility] [place] ([facility] [place] ) [address]  [place] [address]  [phone]     Feb 16, 2024  2:00 PM  (Arrive by 1:45 PM)  SHORT with [facility] [place] ([facility] [place] ) [address]  [place] [address]  [phone]

## 2023-11-30 NOTE — TELEPHONE ENCOUNTER
Failed protocol    Creatinine   Date Value Ref Range Status   11/15/2023 1.15 (H) 0.51 - 0.95 mg/dL Final   06/23/2021 1.07 (H) 0.52 - 1.04 mg/dL Final

## 2023-12-01 RX ORDER — AMLODIPINE BESYLATE 5 MG/1
TABLET ORAL
Qty: 90 TABLET | Refills: 0 | Status: SHIPPED | OUTPATIENT
Start: 2023-12-01 | End: 2024-03-05

## 2023-12-06 ENCOUNTER — ALLIED HEALTH/NURSE VISIT (OUTPATIENT)
Dept: EDUCATION SERVICES | Facility: OTHER | Age: 58
End: 2023-12-06
Attending: NURSE PRACTITIONER
Payer: COMMERCIAL

## 2023-12-06 VITALS
OXYGEN SATURATION: 85 % | HEIGHT: 64 IN | DIASTOLIC BLOOD PRESSURE: 72 MMHG | HEART RATE: 102 BPM | BODY MASS INDEX: 32.73 KG/M2 | WEIGHT: 191.7 LBS | SYSTOLIC BLOOD PRESSURE: 152 MMHG | RESPIRATION RATE: 16 BRPM

## 2023-12-06 DIAGNOSIS — I10 BENIGN ESSENTIAL HYPERTENSION: ICD-10-CM

## 2023-12-06 DIAGNOSIS — E11.65 TYPE 2 DIABETES MELLITUS WITH HYPERGLYCEMIA, WITH LONG-TERM CURRENT USE OF INSULIN (H): ICD-10-CM

## 2023-12-06 DIAGNOSIS — F17.200 NICOTINE DEPENDENCE, UNCOMPLICATED, UNSPECIFIED NICOTINE PRODUCT TYPE: Primary | ICD-10-CM

## 2023-12-06 DIAGNOSIS — Z79.4 TYPE 2 DIABETES MELLITUS WITH HYPERGLYCEMIA, WITH LONG-TERM CURRENT USE OF INSULIN (H): ICD-10-CM

## 2023-12-06 PROCEDURE — 99213 OFFICE O/P EST LOW 20 MIN: CPT | Mod: 25 | Performed by: NURSE PRACTITIONER

## 2023-12-06 PROCEDURE — 95251 CONT GLUC MNTR ANALYSIS I&R: CPT | Performed by: NURSE PRACTITIONER

## 2023-12-06 PROCEDURE — G0463 HOSPITAL OUTPT CLINIC VISIT: HCPCS

## 2023-12-06 ASSESSMENT — PAIN SCALES - GENERAL: PAINLEVEL: NO PAIN (0)

## 2023-12-06 NOTE — PATIENT INSTRUCTIONS
Continue working on healthy eating and moving (start low and slow, work up to 30 min, 5x/week)    BG goals:  Fasting and before meals <130, >70  2 hour after eating <180    We only need 1/2 of these numbers to be within target then your A1c will be within target    Medication changes   Insulin pump settings corrected  Keep working on using the bolus presets    Follow up   BP check in 1-2 weeks (insulin pump download at this time)  Nurse only download when you see Amberly in January  Me in February     Call me sooner if any problems/concerns and/or questions develop including consistent low BGs <70 or consistent high BGs >200  [phone] (Unit Coordinator)    [phone] (Anna, Nurse)    Smoking:   Keep working on quitting

## 2023-12-08 ENCOUNTER — TELEPHONE (OUTPATIENT)
Dept: EDUCATION SERVICES | Facility: OTHER | Age: 58
End: 2023-12-08

## 2023-12-08 NOTE — TELEPHONE ENCOUNTER
Discussed situation with nursing as pt called and was unsure of how much insulin to put in her Omnipod Dash Pod.  Pt recently received a new pdm.  She reports in the past she used 150 units of insulin in the pod.  Plan will be for pt to continue to put 150 units in her pod over the weekend and Courtney Chaudhary CNP will be alerted and change can be made on Monday if needed.

## 2023-12-08 NOTE — TELEPHONE ENCOUNTER
I called the pt and notified.   Pt c/o feeling weak, chills for 1 wk and small amount of diarrhea yesterday. Pt reports a subjective fever with shivering and diaphoresis. Pt denies cough, abd pain, HA. Sepsis code initiated at 5:25pm. Pt placed on a monitor, IV angio placed, labs drawn, IVF given.

## 2023-12-08 NOTE — TELEPHONE ENCOUNTER
Pt calls she doesn't know how much insulin to put in her pod. Pt got a new pdm for her Omnipod Dash recently.

## 2023-12-08 NOTE — PROGRESS NOTES
SUBJECTIVE:  Marly Cannon, 58 year old, female presents with the following Chief Complaint(s) with HPI to follow:  Chief Complaint   Patient presents with    Diabetes        Diabetes Follow-up  Patient is checking blood sugars: >4x/day using her personal CGM (Takeacoderstyle Bud).  Results:    Date: 11/23 to 12/6/23  Glucose management indicator: n/a    Average glucose: 365    Glucose range  Very low (<54): 0  low (<70): 0  In target range (): 0  High (>180): 0  Very high (>250): 100%    Symptoms of hypoglycemia (low blood sugar): no  Paresthesias (numbness or burning in feet) or sores: no, no sores  Diabetic eye exam within the last year: UTD  Breakfast eaten regularly: most of the time  Patient counting carbs: Yes  Exercising: depends on the weather          HPI:  Marly's here today for the follow up regarding her Diabetes mellitus, Type 2.    A1c trend:  Lab Results   Component Value Date    A1C 13.0 07/19/2023    A1C 15.1 04/19/2023    A1C 15.1 01/17/2023    A1C 11.4 10/17/2022    A1C 10.4 07/15/2022    A1C >14.0 05/10/2021    A1C 10.7 02/10/2021    A1C 7.6 11/09/2020    A1C 7.9 08/07/2020    A1C 8.1 05/04/2020     Current Diabetes medication:   1. Omnipod dash, using Novolog  2. Jardiance 25 mg daily--not taking  Statin use: yes, simvastatin 20 mg daily  ASA: yes, 81 mg daily    Marly's here a download of her CGM and insulin pump with possible diabetic medication adjustments.   Reports the following:  Got a new PDM and she set it up herself.     No issues with the Freestyle Bud     Denies any new health concerns.     Weight trend   Wt Readings from Last 4 Encounters:   12/06/23 87 kg (191 lb 11.2 oz)   11/15/23 91.2 kg (201 lb)   10/05/23 91.3 kg (201 lb 4.8 oz)   08/24/23 72.6 kg (160 lb)     Note BP  Denies any symptoms     Patient Active Problem List   Diagnosis    Type 2 diabetes, HbA1c goal < 7% (H)    ACP (advance care planning)    Stage 3 chronic kidney disease (H)    Pulmonary emphysema (H)     Hyperparathyroidism due to renal insufficiency (H24)    Vitamin D deficiency    Intellectual disability    Breast fibrocystic disorder    Tobacco abuse    Microalbuminuria due to type 2 diabetes mellitus (H)    H/O colonoscopy    [place] cardiac risk <10% in next 10 years    Tubular adenoma of colon    Hyperlipidemia, unspecified hyperlipidemia type    Essential hypertension    Callus of foot    Memory disturbance    ADEEL (obstructive sleep apnea)    Tremor    Diabetic polyneuropathy associated with diabetes mellitus due to underlying condition (H)    Urinary incontinence, unspecified type       History reviewed. No pertinent past medical history.    Past Surgical History:   Procedure Laterality Date    BIOPSY BREAST Left 02/14/2008    patient states no bx, Clip in left breast/ stereo bxc 2-- no path in [facility] that far back    COLONOSCOPY N/A 08/20/2015    Procedure: COLONOSCOPY;  Surgeon: Gentry Porter MD;  Location: [facility] OR    COLONOSCOPY N/A 09/21/2018    Procedure: COLONOSCOPY;  COLONOSCOPY WITH POLYPECTOMY;  Surgeon: Gentry Porter MD;  Location: [facility] OR       Family History   Problem Relation Age of Onset    Diabetes Mother     Diabetes Father     Hypertension Brother     Diabetes Sister        Social History     Tobacco Use    Smoking status: Every Day     Packs/day: 1.00     Years: 34.00     Additional pack years: 0.00     Total pack years: 34.00     Types: Cigarettes     Start date: 1/1/1979     Passive exposure: Current    Smokeless tobacco: Never    Tobacco comments:     Declined QP 05/13/2020   Substance Use Topics    Alcohol use: No     Alcohol/week: 0.0 standard drinks of alcohol       Current Outpatient Medications   Medication Sig Dispense Refill    Acetaminophen (TYLENOL PO) Take 325 mg by mouth every 6 hours as needed       amLODIPine (NORVASC) 5 MG tablet TAKE 1 TABLET BY MOUTH DAILY 90 tablet 0    aspirin (ASPIRIN LOW DOSE) 81 MG chewable tablet CHEW AND SWALLOW 1 TABLET BY  MOUTH DAILY 90 tablet 2    atorvastatin (LIPITOR) 40 MG tablet TAKE 1 TABLET BY MOUTH AT BEDTIME 90 tablet 3    budesonide-formoterol (SYMBICORT) 160-4.5 MCG/ACT Inhaler Inhale 2 puffs into the lungs 2 times daily 1 Inhaler 3    Cholecalciferol (VITAMIN D3) 50 MCG (2000 UT) CAPS TAKE 1 CAPSULE BY MOUTH DAILY 90 capsule 0    Continuous Blood Gluc Sensor (FREESTYLE HANK 2 SENSOR) MISC CHANGE 1 SENSOR EVERY 14 DAYS, USE TO READ BLOOD SUGARS PER MANUFACTURERS INSTRUCTIONS 2 each 11    empagliflozin (JARDIANCE) 25 MG TABS tablet TAKE 1 TABLET BY MOUTH DAILY 30 tablet 5    hydrocortisone (CORTAID) 1 % external cream Apply topically 2 times daily 453.6 g 0    insulin aspart (NOVOLOG VIAL) 100 UNITS/ML vial To be used with the insulin pump.  TDD: 70 units 30 mL 3    Insulin Disposable Pump (OMNIPOD DASH PODS, GEN 4,) MISC 1 each every 48 hours 15 each 5    insulin glargine U-300 (TOUJEO SOLOSTAR) 300 UNIT/ML (1 units dial) pen Inject 20 Units Subcutaneous At Bedtime 9 mL 1    insulin pen needle (32G X 4 MM) 32G X 4 MM miscellaneous Use 1 pen needles daily 100 each 3    INSULIN PUMP - OUTPATIENT Insulin pump: Omnipod Dash  Updated: 7/5/23  Basal: 1.2 (new)  Total basal: 28.8 units  CR: 12  ISF: 45  BG target: 100  Active insulin: 4 hours      ipratropium-albuterol (COMBIVENT RESPIMAT)  MCG/ACT inhaler INHALE 1 PUFF INTO THE LUNGS 4 TIMES DAILY 4 g 9    lisinopril (ZESTRIL) 40 MG tablet TAKE 1 TABLET BY MOUTH DAILY 90 tablet 3    nystatin (MYCOSTATIN) 374426 UNIT/GM external powder Apply topically 3 times daily 60 g 3    OneTouch Delica Lancets 33G MISC Inject 1 each Subcutaneous 3 times daily (before meals) 100 each 11    ONETOUCH ULTRA test strip USE TO TEST BLOOD SUGAR 4 TIMES DAILY 100 strip 1    order for DME Women briefs 50 each 1    primidone (MYSOLINE) 50 MG tablet TAKE 1 TABLET BY MOUTH AT BEDTIME 30 tablet 11    urea (GORDONS UREA) 40 % external ointment Apply topically 2 times daily 30 g 0       No Known  "Allergies    REVIEW OF SYSTEMS  Skin: negative  Eyes: negative, wears glasses    Ears/Nose/Throat: negative  Respiratory: No shortness of breath or hemoptysis; smoker's cough; hx of sleep apnea  Cardiovascular: negative  Gastrointestinal: negative  Genitourinary: negative  Musculoskeletal: negative; hx of left shoulder pain and left knee--depends on the weather   Neurologic: negative  Psychiatric: negative  Hematologic/Lymphatic/Immunologic: negative  Endocrine: positive for diabetes     OBJECTIVE:  BP (!) 152/72   Pulse 102   Resp 16   Ht 1.626 m (5' 4\")   Wt 87 kg (191 lb 11.2 oz)   SpO2 (!) 85%   BMI 32.91 kg/m    Constitutional: alert and no distress  Respiratory:  Good diaphragmatic excursion.   Musculoskeletal: extremities normal- no gross deformities noted and gait normal  Skin: no suspicious lesions or rashes to visible skin  Psychiatric: mentation appears normal and affect normal/bright      LABS  No results found for any visits on 12/06/23.      ASSESSMENT / PLAN:  (F17.200) Nicotine dependence, uncomplicated, unspecified nicotine product type  (primary encounter diagnosis)  Comment: noted, refused  Plan:   Spoke with patient regarding options for smoking cessation.  Various pharmacologic options and behavioral techniques were reviewed.  The relative effectiveness as well as risks and benefits were reviewed with patient.  Patient is interested in: No interventions    Smoking Cessation    Let us know when you are ready    (E11.65,  Z79.4) Type 2 diabetes mellitus with hyperglycemia, with long-term current use of insulin (H)  Comment: noted CGM and insulin pump download; noted A1c  Plan: GLUCOSE MONITOR, 72 HOUR, PHYS INTERP    Insulin pump basal rate was incorrect, 0.05    Change Marly's insulin pump settings to the following:  Insulin pump: Omnipod Dash  Updated: 12/6/23  Basal  0000 1.2  Total basal: 28.8   CR  0000 15  ISF  0000 50  BG target  0000 120  BG correction  0000 130  Code: 1963     "     Education on how to enter her carb bolus  Keep working on entering carb bolus before consuming meals/snacks    (I10) Benign essential hypertension  Comment: noted, no symptoms  Plan:   No symptoms  Reviewed symptoms to report  Will have nurse recheck in 1-2 weeks    Follow up  As scheduled.      Call sooner if any issues    Patient Instructions   Continue working on healthy eating and moving (start low and slow, work up to 30 min, 5x/week)    BG goals:  Fasting and before meals <130, >70  2 hour after eating <180    We only need 1/2 of these numbers to be within target then your A1c will be within target    Medication changes   Insulin pump settings corrected  Keep working on using the bolus presets    Follow up   BP check in 1-2 weeks (insulin pump download at this time)  Nurse only download when you see Amberly in January  Me in February     Call me sooner if any problems/concerns and/or questions develop including consistent low BGs <70 or consistent high BGs >200  [phone] (Unit Coordinator)    [phone] (Anna, Nurse)    Smoking:   Keep working on quitting        Time: 25 minutes  Barrier: see [facility]  Willingness to learn: accepting    Courtney PINTO [facility]  Diabetes and Wound Care    Cc: Amberly Whyte NP    With the electronic record, we can now more quickly and easily track our patient diabetic goals. Our diabetes clinical review is in progress and these are the indicators we are monitoring for good diabetes health.     1.) HbA1C less than 7 (measurement of your average blood sugars)  2.) Blood Pressure less than 140/80  3.) LDL less than 100 (bad cholesterol)  4.) HbA1C is checked in the last 6 months and below 7% (more frequently if not at goal or adjusting medications)  5.) LDL is checked in the last 12 months (more frequently if not at goal or adjusting medications)  6.) Taking one baby aspirin daily (unless otherwise instructed)  7.) No tobacco use  8) Statin use     You have achieved 5 out of 8  of these and I am encouraging you to come in and get tuned up to achieve 8 out of 8!  Here is what you have achieved so far in my goals for you:  1.) HbA1C  less than 7:                              NO  Your last  HbA1C :  Lab Results   Component Value Date    A1C 11.0 11/15/2023    A1C 13.0 07/19/2023    A1C 15.1 04/19/2023    A1C 15.1 01/17/2023    A1C 11.4 10/17/2022    A1C >14.0 05/10/2021    A1C 10.7 02/10/2021    A1C 7.6 11/09/2020    A1C 7.9 08/07/2020    A1C 8.1 05/04/2020     2.) Blood Pressure less than 140/80:       NO      Your last                       BP Readings from Last 1 Encounters:   12/06/23 (!) 152/72     3.) LDL less than 100:                              YES      Your last     LDL Cholesterol Calculated   Date Value Ref Range Status   07/19/2023 66 <=100 mg/dL Final   11/09/2020 74 <100 mg/dL Final     Comment:     Desirable:       <100 mg/dl       4.) Checked HbA1C in the past 6 months: YES      5.) Checked LDL in the past 12 months:    YES      6.) Taking one aspirin daily:                       YES     7.) No tobacco use:                                        NO   8.) Statin use      YES

## 2023-12-08 NOTE — TELEPHONE ENCOUNTER
I called the pt back she has been putting 150 units in her pod, but is wondering if she now needs more.

## 2023-12-14 ENCOUNTER — ALLIED HEALTH/NURSE VISIT (OUTPATIENT)
Dept: EDUCATION SERVICES | Facility: OTHER | Age: 58
End: 2023-12-14
Attending: NURSE PRACTITIONER
Payer: COMMERCIAL

## 2023-12-14 VITALS — DIASTOLIC BLOOD PRESSURE: 70 MMHG | SYSTOLIC BLOOD PRESSURE: 124 MMHG

## 2023-12-14 DIAGNOSIS — E11.65 TYPE 2 DIABETES MELLITUS WITH HYPERGLYCEMIA, WITH LONG-TERM CURRENT USE OF INSULIN (H): Primary | ICD-10-CM

## 2023-12-14 DIAGNOSIS — Z79.4 TYPE 2 DIABETES MELLITUS WITH HYPERGLYCEMIA, WITH LONG-TERM CURRENT USE OF INSULIN (H): Primary | ICD-10-CM

## 2023-12-14 NOTE — PROGRESS NOTES
Noted CGM download:    Date: 12/1 to 12/14/23  Glucose management indicator: n/a    Average glucose: 274    Glucose range  Very low (<54): 0  low (<70): 0  In target range (): 23%  High (>180): 20%  Very high (>250): 57%      Insulin pump: Omnipod Dash  Date: 12/14/23  Basal: 1.2   Total basal: 28.8 units  CR: 15  ISF: 50  BG target range: 120  BG correction: 130  Active insulin: 4 hours      No change in her insulin pump settings.   Needs to work on giving an insulin carb bolus every time she consumes carbs    Education on how to add 5 units before meals      Courtney Chaudhary APRN FNP-BC  Diabetes and Wound Care

## 2024-01-09 ENCOUNTER — TRANSFERRED RECORDS (OUTPATIENT)
Dept: HEALTH INFORMATION MANAGEMENT | Facility: CLINIC | Age: 59
End: 2024-01-09
Payer: COMMERCIAL

## 2024-01-15 ENCOUNTER — ALLIED HEALTH/NURSE VISIT (OUTPATIENT)
Dept: EDUCATION SERVICES | Facility: OTHER | Age: 59
End: 2024-01-15
Attending: NURSE PRACTITIONER
Payer: COMMERCIAL

## 2024-01-15 DIAGNOSIS — Z79.4 TYPE 2 DIABETES MELLITUS WITH HYPERGLYCEMIA, WITH LONG-TERM CURRENT USE OF INSULIN (H): Primary | ICD-10-CM

## 2024-01-15 DIAGNOSIS — E11.65 TYPE 2 DIABETES MELLITUS WITH HYPERGLYCEMIA, WITH LONG-TERM CURRENT USE OF INSULIN (H): Primary | ICD-10-CM

## 2024-01-15 NOTE — PROGRESS NOTES
Pt came in for a Omnipod and Bud download today. This was completed and given to Courtney Chaudhary.    Gege Walter

## 2024-01-17 NOTE — PROGRESS NOTES
Marly's here for a download:    Date: 1/2 to 1/15/24  Glucose management indicator: 9.2%    Average glucose: 245    Glucose range  Very low (<54): 0  low (<70): 0  In target range (): 23%  High (>180): 35%  Very high (>250): 42%    Insulin pump:  Basal: 29.7 units (100%)    Insulin pump  Basal: 1.2  Total basal: 28.8 units  CR: 15  ISF: 50  BG target: 120  BG correction threshold: 130     Still not entering carb bolus when consuming carbs.    Encouraged her to do so.        Courtney Chaudhary APRN FNP-BC  Diabetes and Wound Care

## 2024-01-20 DIAGNOSIS — Z53.9 PERSONS ENCOUNTERING HEALTH SERVICES FOR SPECIFIC PROCEDURES, NOT CARRIED OUT: Primary | ICD-10-CM

## 2024-01-20 PROCEDURE — G0179 MD RECERTIFICATION HHA PT: HCPCS | Performed by: NURSE PRACTITIONER

## 2024-01-29 DIAGNOSIS — E11.65 TYPE 2 DIABETES MELLITUS WITH HYPERGLYCEMIA, WITH LONG-TERM CURRENT USE OF INSULIN (H): ICD-10-CM

## 2024-01-29 DIAGNOSIS — Z79.4 TYPE 2 DIABETES MELLITUS WITH HYPERGLYCEMIA, WITH LONG-TERM CURRENT USE OF INSULIN (H): ICD-10-CM

## 2024-01-29 RX ORDER — BLOOD SUGAR DIAGNOSTIC
STRIP MISCELLANEOUS
Qty: 100 STRIP | Refills: 4 | Status: SHIPPED | OUTPATIENT
Start: 2024-01-29

## 2024-01-29 NOTE — TELEPHONE ENCOUNTER
Test Strip  Last Written Prescription Date: 5/3/22  Last Fill Quantity: 100 # of Refills: 1  Last Office Visit: 11/15/23

## 2024-01-30 DIAGNOSIS — E55.9 VITAMIN D DEFICIENCY: ICD-10-CM

## 2024-01-30 RX ORDER — ACETAMINOPHEN 160 MG
TABLET,DISINTEGRATING ORAL
Qty: 90 CAPSULE | Refills: 3 | Status: SHIPPED | OUTPATIENT
Start: 2024-01-30

## 2024-02-07 ENCOUNTER — ALLIED HEALTH/NURSE VISIT (OUTPATIENT)
Dept: EDUCATION SERVICES | Facility: OTHER | Age: 59
End: 2024-02-07
Attending: NURSE PRACTITIONER
Payer: COMMERCIAL

## 2024-02-07 VITALS
HEART RATE: 86 BPM | WEIGHT: 190.1 LBS | SYSTOLIC BLOOD PRESSURE: 135 MMHG | OXYGEN SATURATION: 91 % | RESPIRATION RATE: 16 BRPM | HEIGHT: 64 IN | BODY MASS INDEX: 32.46 KG/M2 | DIASTOLIC BLOOD PRESSURE: 84 MMHG

## 2024-02-07 DIAGNOSIS — R25.1 TREMOR: ICD-10-CM

## 2024-02-07 DIAGNOSIS — F17.200 NICOTINE DEPENDENCE, UNCOMPLICATED, UNSPECIFIED NICOTINE PRODUCT TYPE: ICD-10-CM

## 2024-02-07 DIAGNOSIS — Z79.4 TYPE 2 DIABETES MELLITUS WITH HYPERGLYCEMIA, WITH LONG-TERM CURRENT USE OF INSULIN (H): Primary | ICD-10-CM

## 2024-02-07 DIAGNOSIS — E11.65 TYPE 2 DIABETES MELLITUS WITH HYPERGLYCEMIA, WITH LONG-TERM CURRENT USE OF INSULIN (H): Primary | ICD-10-CM

## 2024-02-07 PROCEDURE — 99213 OFFICE O/P EST LOW 20 MIN: CPT | Mod: 25 | Performed by: NURSE PRACTITIONER

## 2024-02-07 PROCEDURE — 95251 CONT GLUC MNTR ANALYSIS I&R: CPT | Performed by: NURSE PRACTITIONER

## 2024-02-07 PROCEDURE — G0463 HOSPITAL OUTPT CLINIC VISIT: HCPCS | Performed by: NURSE PRACTITIONER

## 2024-02-07 ASSESSMENT — PAIN SCALES - GENERAL: PAINLEVEL: NO PAIN (0)

## 2024-02-14 NOTE — PROGRESS NOTES
SUBJECTIVE:  Marly Cannon, 58 year old, female presents with the following Chief Complaint(s) with HPI to follow:  Chief Complaint   Patient presents with    Diabetes        Diabetes Follow-up  Patient is checking blood sugars: >4x/day using her personal CGM (Freestyle Bud).  Results:    Date: 1/25 to 2/7/24  Glucose management indicator: 7.5%    Average glucose: 177    Glucose range  Very low (<54): 0  low (<70): 0  In target range (): 57%  High (>180): 27%  Very high (>250): 16%    Symptoms of hypoglycemia (low blood sugar): no  Paresthesias (numbness or burning in feet) or sores: no, no sores  Diabetic eye exam within the last year: UTD  Breakfast eaten regularly: most of the time  Patient counting carbs: Yes  Exercising: depends on the weather          HPI:  Marly's here today for the follow up regarding her Diabetes mellitus, Type 2.    A1c trend:  Lab Results   Component Value Date    A1C 11.0 11/15/2023    A1C 13.0 07/19/2023    A1C 15.1 04/19/2023    A1C 15.1 01/17/2023    A1C 11.4 10/17/2022    A1C >14.0 05/10/2021    A1C 10.7 02/10/2021    A1C 7.6 11/09/2020    A1C 7.9 08/07/2020    A1C 8.1 05/04/2020     Current Diabetes medication:   1. Omnipod dash, using Novolog  2. Jardiance 25 mg daily--not taking  Statin use: yes, simvastatin 20 mg daily  ASA: yes, 81 mg daily    Marly reports the following:  No issues with the Freestyle Bud   No issues with insulin pump    States issues with left arm tremors  Started about 2 weeks ago  Last 10-15 minutes   No other symptoms     Weight trend   Wt Readings from Last 4 Encounters:   02/07/24 86.2 kg (190 lb 1.6 oz)   12/06/23 87 kg (191 lb 11.2 oz)   11/15/23 91.2 kg (201 lb)   10/05/23 91.3 kg (201 lb 4.8 oz)       Patient Active Problem List   Diagnosis    Type 2 diabetes, HbA1c goal < 7% (H)    ACP (advance care planning)    Stage 3 chronic kidney disease (H)    Pulmonary emphysema (H)    Hyperparathyroidism due to renal insufficiency (H24)     Vitamin D deficiency    Intellectual disability    Breast fibrocystic disorder    Tobacco abuse    Microalbuminuria due to type 2 diabetes mellitus (H)    H/O colonoscopy    [place] cardiac risk <10% in next 10 years    Tubular adenoma of colon    Hyperlipidemia, unspecified hyperlipidemia type    Essential hypertension    Callus of foot    Memory disturbance    ADEEL (obstructive sleep apnea)    Tremor    Diabetic polyneuropathy associated with diabetes mellitus due to underlying condition (H)    Urinary incontinence, unspecified type       History reviewed. No pertinent past medical history.    Past Surgical History:   Procedure Laterality Date    BIOPSY BREAST Left 02/14/2008    patient states no bx, Clip in left breast/ stereo bxc 2-- no path in EPIC that far back    COLONOSCOPY N/A 08/20/2015    Procedure: COLONOSCOPY;  Surgeon: Gentry Porter MD;  Location: [facility]    COLONOSCOPY N/A 09/21/2018    Procedure: COLONOSCOPY;  COLONOSCOPY WITH POLYPECTOMY;  Surgeon: Gentry Porter MD;  Location: [facility]       Family History   Problem Relation Age of Onset    Diabetes Mother     Diabetes Father     Hypertension Brother     Diabetes Sister        Social History     Tobacco Use    Smoking status: Every Day     Packs/day: 1.00     Years: 34.00     Additional pack years: 0.00     Total pack years: 34.00     Types: Cigarettes     Start date: 1/1/1979     Passive exposure: Current    Smokeless tobacco: Never    Tobacco comments:     Declined QP 05/13/2020   Substance Use Topics    Alcohol use: No     Alcohol/week: 0.0 standard drinks of alcohol       Current Outpatient Medications   Medication Sig Dispense Refill    Acetaminophen (TYLENOL PO) Take 325 mg by mouth every 6 hours as needed       amLODIPine (NORVASC) 5 MG tablet TAKE 1 TABLET BY MOUTH DAILY 90 tablet 0    aspirin (ASPIRIN LOW DOSE) 81 MG chewable tablet CHEW AND SWALLOW 1 TABLET BY MOUTH DAILY 90 tablet 2    atorvastatin (LIPITOR) 40 MG tablet  TAKE 1 TABLET BY MOUTH AT BEDTIME 90 tablet 3    blood glucose (ONETOUCH ULTRA) test strip USE TO TEST BLOOD SUGAR 4 TIMES DAILY 100 strip 4    budesonide-formoterol (SYMBICORT) 160-4.5 MCG/ACT Inhaler Inhale 2 puffs into the lungs 2 times daily 1 Inhaler 3    Cholecalciferol (VITAMIN D3) 50 MCG (2000 UT) CAPS TAKE 1 CAPSULE BY MOUTH DAILY 90 capsule 3    Continuous Blood Gluc Sensor (FREESTYLE HANK 2 SENSOR) MISC CHANGE 1 SENSOR EVERY 14 DAYS, USE TO READ BLOOD SUGARS PER MANUFACTURERS INSTRUCTIONS 2 each 11    empagliflozin (JARDIANCE) 25 MG TABS tablet TAKE 1 TABLET BY MOUTH DAILY 30 tablet 5    hydrocortisone (CORTAID) 1 % external cream Apply topically 2 times daily 453.6 g 0    insulin aspart (NOVOLOG VIAL) 100 UNITS/ML vial To be used with the insulin pump.  TDD: 70 units 30 mL 3    Insulin Disposable Pump (OMNIPOD DASH PODS, GEN 4,) MISC 1 each every 48 hours 15 each 5    insulin glargine U-300 (TOUJEO SOLOSTAR) 300 UNIT/ML (1 units dial) pen Inject 20 Units Subcutaneous At Bedtime 9 mL 1    insulin pen needle (32G X 4 MM) 32G X 4 MM miscellaneous Use 1 pen needles daily 100 each 3    INSULIN PUMP - OUTPATIENT Updated: 12/6/23  Basal  0000 1.2  Total basal: 28.8   CR  0000 15  ISF  0000 50  BG target  0000 120  BG correction  0000 130      ipratropium-albuterol (COMBIVENT RESPIMAT)  MCG/ACT inhaler INHALE 1 PUFF INTO THE LUNGS 4 TIMES DAILY 4 g 9    lisinopril (ZESTRIL) 40 MG tablet TAKE 1 TABLET BY MOUTH DAILY 90 tablet 3    nystatin (MYCOSTATIN) 972184 UNIT/GM external powder Apply topically 3 times daily 60 g 3    OneTouch Delica Lancets 33G MISC Inject 1 each Subcutaneous 3 times daily (before meals) 100 each 11    order for DME Women briefs 50 each 1    primidone (MYSOLINE) 50 MG tablet TAKE 1 TABLET BY MOUTH AT BEDTIME 30 tablet 11    urea (GORDONS UREA) 40 % external ointment Apply topically 2 times daily 30 g 0       No Known Allergies    REVIEW OF SYSTEMS  Skin: negative  Eyes: negative,  "wears glasses    Ears/Nose/Throat: negative  Respiratory: No shortness of breath or hemoptysis; smoker's cough; hx of sleep apnea  Cardiovascular: negative  Gastrointestinal: negative  Genitourinary: negative  Musculoskeletal: negative; hx of left shoulder pain and left knee--depends on the weather   Neurologic: negative  Psychiatric: negative  Hematologic/Lymphatic/Immunologic: negative  Endocrine: positive for diabetes     OBJECTIVE:  /84   Pulse 86   Resp 16   Ht 1.626 m (5' 4\")   Wt 86.2 kg (190 lb 1.6 oz)   SpO2 91%   BMI 32.63 kg/m    Constitutional: alert and no distress  Respiratory:  Good diaphragmatic excursion.   Musculoskeletal: extremities normal- no gross deformities noted and gait normal  Skin: no suspicious lesions or rashes to visible skin  Psychiatric: mentation appears normal and affect normal/bright      LABS  Results for orders placed or performed in visit on 02/07/24   GLUCOSE MONITOR, 72 HOUR, PHYS INTERP     Status: None    Narrative    Date: 1/25 to 2/7/24  Glucose management indicator: 7.5%    Average glucose: 177    Glucose range  Very low (<54): 0  low (<70): 0  In target range (): 57%  High (>180): 27%  Very high (>250): 16%         ASSESSMENT / PLAN:  (E11.65,  Z79.4) Type 2 diabetes mellitus with hyperglycemia, with long-term current use of insulin (H)  (primary encounter diagnosis)  Comment: noted CGM and insulin pump download    Insulin pump  Omnipod Dash  Date: 2/7/24  Basal: 1.2  Total basal: 28.8  CR:15  ISF: 50  BG target: 120  BG correction: 130  Active insulin: 4 hours    Plan: GLUCOSE MONITOR, 72 HOUR, PHYS INTERP          Marly isn't adding carbs to her insulin pump  Basal: 100%  Bolus: 0    Education focused on this  Some is better than none    (F17.200) Nicotine dependence, uncomplicated, unspecified nicotine product type  Comment: noted, encouraged quitting  Plan:   Spoke with patient regarding options for smoking cessation.  Various pharmacologic " options and behavioral techniques were reviewed.  The relative effectiveness as well as risks and benefits were reviewed with patient.  Patient is interested in: No interventions    Smoking Cessation    Let us know when you are ready    (R25.1) Tremor  Comment: noted  Plan:   Spoke to her PCP, Amberly BARNES NP  History of known tremors   Will address it at next appointment    Marly's aware to call if any symptoms start developing.       Follow up  As scheduled.      Call sooner if any issues    Patient Instructions   Continue working on healthy eating and moving (start low and slow, work up to 30 min, 5x/week)    BG goals:  Fasting and before meals <130, >70  2 hour after eating <180    We only need 1/2 of these numbers to be within target then your A1c will be within target    Medication changes   None today    Keep working on entering carb bolus every time you consume carbs    Follow up   As discussed    Call me sooner if any problems/concerns and/or questions develop including consistent low BGs <70 or consistent high BGs >200  [phone] (Unit Coordinator)    [phone] (Anna, Nurse)    Smoking:   Keep working on quitting    Please keep your schedule appointment with Amberly BARNES NP    Time: 25 minutes  Barrier: see PMH  Willingness to learn: accepting    Courtney PINTO [facility]  Diabetes [facility] Wound [facility]    Cc: Amberly Whyte NP    With the electronic record, we can now more quickly and easily track our patient diabetic goals. Our diabetes clinical review is in progress and these are the indicators we are monitoring for good diabetes health.     1.) HbA1C less than 7 (measurement of your average blood sugars)  2.) Blood Pressure less than 140/80  3.) LDL less than 100 (bad cholesterol)  4.) HbA1C is checked in the last 6 months and below 7% (more frequently if not at goal or adjusting medications)  5.) LDL is checked in the last 12 months (more frequently if not at goal or adjusting medications)  6.) Taking one  baby aspirin daily (unless otherwise instructed)  7.) No tobacco use  8) Statin use     You have achieved 6 out of 8 of these and I am encouraging you to come in and get tuned up to achieve 8 out of 8!  Here is what you have achieved so far in my goals for you:  1.) HbA1C  less than 7:                              NO  Your last  HbA1C :  Lab Results   Component Value Date    A1C 11.0 11/15/2023    A1C 13.0 07/19/2023    A1C 15.1 04/19/2023    A1C 15.1 01/17/2023    A1C 11.4 10/17/2022    A1C >14.0 05/10/2021    A1C 10.7 02/10/2021    A1C 7.6 11/09/2020    A1C 7.9 08/07/2020    A1C 8.1 05/04/2020     2.) Blood Pressure less than 140/80:       YES     Your last                       BP Readings from Last 1 Encounters:   02/07/24 135/84     3.) LDL less than 100:                              YES      Your last     LDL Cholesterol Calculated   Date Value Ref Range Status   07/19/2023 66 <=100 mg/dL Final   11/09/2020 74 <100 mg/dL Final     Comment:     Desirable:       <100 mg/dl       4.) Checked HbA1C in the past 6 months: YES      5.) Checked LDL in the past 12 months:    YES      6.) Taking one aspirin daily:                       YES     7.) No tobacco use:                                        NO   8.) Statin use      YES

## 2024-02-14 NOTE — PATIENT INSTRUCTIONS
Continue working on healthy eating and moving (start low and slow, work up to 30 min, 5x/week)    BG goals:  Fasting and before meals <130, >70  2 hour after eating <180    We only need 1/2 of these numbers to be within target then your A1c will be within target    Medication changes   None today    Keep working on entering carb bolus every time you consume carbs    Follow up   As discussed    Call me sooner if any problems/concerns and/or questions develop including consistent low BGs <70 or consistent high BGs >200  [phone] (Unit Coordinator)    [phone] (Anan, Nurse)    Smoking:   Keep working on quitting    Please keep your schedule appointment with Amberly BARNES NP

## 2024-02-15 NOTE — PROGRESS NOTES
Assessment & Plan     (E11.29,  R80.9) Microalbuminuria due to type 2 diabetes mellitus (H)  (primary encounter diagnosis)  (E11.9) Type 2 diabetes, HbA1c goal < 7% (H)  Plan: A1C in process. Will notify patient of the results when available and intervene accordingly. Will continue f/up with the [facility]. Weight down. Has stopped drinking regular soda and is trying to walk more. On statin. BP at goal. Eye exam UTD. See me 3 months, sooner with new or worsening symptoms.     (J43.9) Pulmonary emphysema, unspecified emphysema type (H)  Plan: Stable. Continue current medications and f/up with pulmonary. Smoking cessation encouraged.     (I10) Essential hypertension  Plan: Well controlled. Continue current medications. Encouraged daily exercise and a low sodium diet. Recommended checking BP's 2x/wk, call the clinic if consistantly s>140 or d>90. Follow up in 6 months.     (E78.5) Hyperlipidemia, unspecified hyperlipidemia type  Comment: tolerating statin  Plan: Lipids controlled in 7/2023. AST and ALT normal in 7/2023    (N18.30) Stage 3 chronic kidney disease, unspecified whether stage 3a or 3b CKD (H)  Plan: BMP in process. Will notify patient of the results when available and intervene accordingly. Avoids NSAIDs. On ACE and Jardiance.     (Z72.0) Tobacco abuse  Plan: Cessation encouraged.     (R80.9) Microalbuminuria  Plan: Continue ACE.     (R25.1) Tremor  Plan: Has seen Dr. Aaron. Diagnosed with benign tremor. Will continue the primidone. Declines an additional work-up.     (G47.34) Nocturnal hypoxia  (D58.2) Elevated hemoglobin (H24)  Plan: Hgb elevated. Most likely due to nocturnal hypoxia. She has seen Dr. Duval in the past who recommended a bilevel pap, but she refuses to wear. Declines additional work up at this time. Aware of the risks. She does take an asa daily.       Mike Luke is a 58 year old, presenting for the following health issues:  Diabetes, Hypertension, Lipids, Kidney Problem  (CKD), and COPD    HPI     Diabetes Follow-up    How often are you checking your blood sugar? Continuous glucose monitor, Pump  What time of day are you checking your blood sugars (select all that apply)?  Before meals  Have you had any blood sugars above 200?  No  Have you had any blood sugars below 70?  No  What symptoms do you notice when your blood sugar is low?  Shaky  What concerns do you have today about your diabetes? None   Do you have any of these symptoms? (Select all that apply)  No numbness or tingling in feet.  No redness, sores or blisters on feet.  No complaints of excessive thirst.  No reports of blurry vision.  No significant changes to weight.  A1C was 11.0 on 11/15/23. Non-complaint with medications.   Follows with the diabetic center. Last visit with them was on 2/7/24. Note was reviewed. Average glucose was 177 which had improved.   Working on weight loss. Switched to diet soda. Weight down 11 pounds since 10/2023.   Taking Novolog, Toujeo, and Jardiance without side effects. Metformin caused an upset stomach. Is often noncomplaint with her medications. Does not take as prescribed.   On asa.   She denies chest pain, dizziness, syncope, or palpitations. Some chronic shortness of breath related to her COPD. Feels this has continued to improved. Denies any recent shortness of breath. Does follow with pulmonary. Note was reviewed. PFTs were ordered. Was referred to ENT for airway evaluation.   Walking more.   On ACE.   She does have chronic kidney disease. Typically avoids NSAIDs.   No abdominal pain. No nausea or vomiting.     Due for several vaccines. Declines.     She does smoke, currently smoking 1 ppd. No interest in quitting.     Due for a complete physical with pap. Encouraged to schedule.     Hyperlipidemia Follow-Up    Are you regularly taking any medication or supplement to lower your cholesterol?   Yes- Lipitor 40 mg  Are you having muscle aches or other side effects that you think could  be caused by your cholesterol lowering medication?  No    Hypertension Follow-up    Do you check your blood pressure regularly outside of the clinic? Yes  Has a nurse that comes in and checks her B/P every week.  Are you following a low salt diet? No  Are your blood pressures ever more than 140 on the top number (systolic) OR more   than 90 on the bottom number (diastolic), for example 140/90? No  Using lisinopril 40 mg with amlodipine 5 mg. No side effects.      Elevated Hgb, on asa. Likely from COPD and ADEEL. Smoking cessation encouraged. She also has severe ADEEL with nearly continuous nocturnal hypoxia. Follows with Dr. Duval. Last seen on 8/24/23. A bilevel PAP with supplemental oxygen was recommended, but she declined.  Note was reviewed.  She also saw pulmonary recently. Concerned about restrictive process and was referred to ENT. Appointment schedule for next month. JAK2 mutation was negative.      Due for a colonoscopy. Scheduled.     BP Readings from Last 2 Encounters:   02/16/24 118/78   02/07/24 135/84     Hemoglobin A1C (%)   Date Value   11/15/2023 11.0 (H)   07/19/2023 13.0 (H)   05/10/2021 >14.0 (H)   02/10/2021 10.7 (H)     LDL Cholesterol Calculated (mg/dL)   Date Value   07/19/2023 66   07/15/2022 66   11/09/2020 74   02/04/2020 49         COPD Follow-Up  Overall, how are your COPD symptoms since your last clinic visit?  Better  How much fatigue or shortness of breath do you have when you are walking?  Same as usual  How much shortness of breath do you have when you are resting?  None  How often do you cough? Rarely  Have you noticed any change in your sputum/phlegm?  No  Have you experienced a recent fever? No  Please describe how far you can walk without stopping to rest:  2-5 blocks  How many flights of stairs are you able to walk up without stopping?  2  Have you had any Emergency Room Visits, Urgent Care Visits, or Hospital Admissions because of your COPD since your last office visit?   "No    History   Smoking Status    Every Day    Packs/day: 1.00    Years: 34.00    Types: Cigarettes    Start date: 1/1/1979   Smokeless Tobacco    Never     No results found for: \"FEV1\", \"YLO2FSI\"  Tremor; taking Primidone. Feels her tremor is controlled.     Chronic Kidney Disease Follow-up  Do you take any over the counter pain medicine?: No        Review of Systems  Constitutional, HEENT, cardiovascular, pulmonary, gi and gu systems are negative, except as otherwise noted.      Objective    /78   Pulse 80   Temp 97.7  F (36.5  C) (Tympanic)   Resp 16   Ht 1.626 m (5' 4\")   Wt 86.2 kg (190 lb)   SpO2 94%   BMI 32.61 kg/m    Body mass index is 32.61 kg/m .  Physical Exam   GENERAL: alert, no distress, and over weight  EYES: Eyes grossly normal to inspection, PERRL and conjunctivae and sclerae normal  HENT: ear canals and TM's normal, nose and mouth without ulcers or lesions  NECK: no adenopathy, no asymmetry, masses, or scars  RESP: lungs clear to auscultation - no rales, rhonchi or wheezes  CV: regular rate and rhythm, normal S1 S2, no S3 or S4, no murmur, click or rub, no peripheral edema  ABDOMEN: soft, nontender, no hepatosplenomegaly, no masses and bowel sounds normal  MS: no gross musculoskeletal defects noted, no edema  NEURO: Normal strength and tone, mentation intact and speech normal  PSYCH: mentation appears normal, affect normal/bright  Diabetic foot exam: normal DP and PT pulses, no trophic changes or ulcerative lesions, and normal sensory exam    Labs in process        Signed Electronically by: Amberly Whyte NP    "

## 2024-02-16 ENCOUNTER — OFFICE VISIT (OUTPATIENT)
Dept: FAMILY MEDICINE | Facility: OTHER | Age: 59
End: 2024-02-16
Attending: NURSE PRACTITIONER
Payer: COMMERCIAL

## 2024-02-16 VITALS
BODY MASS INDEX: 32.44 KG/M2 | RESPIRATION RATE: 16 BRPM | HEART RATE: 80 BPM | HEIGHT: 64 IN | TEMPERATURE: 97.7 F | SYSTOLIC BLOOD PRESSURE: 118 MMHG | DIASTOLIC BLOOD PRESSURE: 78 MMHG | WEIGHT: 190 LBS | OXYGEN SATURATION: 94 %

## 2024-02-16 DIAGNOSIS — R80.9 MICROALBUMINURIA DUE TO TYPE 2 DIABETES MELLITUS (H): Primary | ICD-10-CM

## 2024-02-16 DIAGNOSIS — R80.9 MICROALBUMINURIA: ICD-10-CM

## 2024-02-16 DIAGNOSIS — E11.9 TYPE 2 DIABETES, HBA1C GOAL < 7% (H): ICD-10-CM

## 2024-02-16 DIAGNOSIS — D58.2 ELEVATED HEMOGLOBIN (H): ICD-10-CM

## 2024-02-16 DIAGNOSIS — Z72.0 TOBACCO ABUSE: ICD-10-CM

## 2024-02-16 DIAGNOSIS — E11.29 MICROALBUMINURIA DUE TO TYPE 2 DIABETES MELLITUS (H): Primary | ICD-10-CM

## 2024-02-16 DIAGNOSIS — N18.30 STAGE 3 CHRONIC KIDNEY DISEASE, UNSPECIFIED WHETHER STAGE 3A OR 3B CKD (H): ICD-10-CM

## 2024-02-16 DIAGNOSIS — R25.1 TREMOR: ICD-10-CM

## 2024-02-16 DIAGNOSIS — E78.5 HYPERLIPIDEMIA, UNSPECIFIED HYPERLIPIDEMIA TYPE: ICD-10-CM

## 2024-02-16 DIAGNOSIS — G47.34 NOCTURNAL HYPOXIA: ICD-10-CM

## 2024-02-16 DIAGNOSIS — J43.9 PULMONARY EMPHYSEMA, UNSPECIFIED EMPHYSEMA TYPE (H): ICD-10-CM

## 2024-02-16 DIAGNOSIS — I10 ESSENTIAL HYPERTENSION: ICD-10-CM

## 2024-02-16 LAB
ANION GAP SERPL CALCULATED.3IONS-SCNC: 12 MMOL/L (ref 7–15)
BUN SERPL-MCNC: 14 MG/DL (ref 8–23)
CALCIUM SERPL-MCNC: 9.8 MG/DL (ref 8.8–10.2)
CHLORIDE SERPL-SCNC: 101 MMOL/L (ref 98–107)
CREAT SERPL-MCNC: 1.05 MG/DL (ref 0.51–0.95)
DEPRECATED HCO3 PLAS-SCNC: 26 MMOL/L (ref 22–29)
EGFRCR SERPLBLD CKD-EPI 2021: 61 ML/MIN/1.73M2
EST. AVERAGE GLUCOSE BLD GHB EST-MCNC: 246 MG/DL
GLUCOSE SERPL-MCNC: 212 MG/DL (ref 70–99)
HBA1C MFR BLD: 10.2 %
POTASSIUM SERPL-SCNC: 4.1 MMOL/L (ref 3.4–5.3)
SODIUM SERPL-SCNC: 139 MMOL/L (ref 135–145)

## 2024-02-16 PROCEDURE — G0463 HOSPITAL OUTPT CLINIC VISIT: HCPCS

## 2024-02-16 PROCEDURE — 99214 OFFICE O/P EST MOD 30 MIN: CPT | Performed by: NURSE PRACTITIONER

## 2024-02-16 PROCEDURE — 80048 BASIC METABOLIC PNL TOTAL CA: CPT | Mod: ZL | Performed by: NURSE PRACTITIONER

## 2024-02-16 PROCEDURE — 83036 HEMOGLOBIN GLYCOSYLATED A1C: CPT | Mod: ZL | Performed by: NURSE PRACTITIONER

## 2024-02-16 PROCEDURE — 36415 COLL VENOUS BLD VENIPUNCTURE: CPT | Mod: ZL | Performed by: NURSE PRACTITIONER

## 2024-02-16 ASSESSMENT — PAIN SCALES - GENERAL: PAINLEVEL: NO PAIN (0)

## 2024-03-05 DIAGNOSIS — I10 ESSENTIAL HYPERTENSION: ICD-10-CM

## 2024-03-05 RX ORDER — AMLODIPINE BESYLATE 5 MG/1
TABLET ORAL
Qty: 90 TABLET | Refills: 0 | Status: SHIPPED | OUTPATIENT
Start: 2024-03-05 | End: 2024-03-14

## 2024-03-05 RX ORDER — ASPIRIN 81 MG/1
TABLET, CHEWABLE ORAL
Qty: 90 TABLET | Refills: 0 | Status: SHIPPED | OUTPATIENT
Start: 2024-03-05 | End: 2024-03-14

## 2024-03-05 NOTE — TELEPHONE ENCOUNTER
aspirin (ASPIRIN LOW DOSE) 81 MG chewable tablet   Last Written Prescription Date:  6-9-23  Last Fill Quantity: 90,   # refills: 3  Last Office Visit: 2-26-24      amLODIPine (NORVASC) 5 MG tablet     Last Written Prescription Date:  12-1-23  Last Fill Quantity: 90,   # refills: 0  Last Office Visit: 2-16-24  Future Office visit:    Next 5 appointments (look out 90 days)      Mar 14, 2024  1:00 PM  (Arrive by 12:45 PM)  Pre-Op physical with Amberly Whyte NP  [facility] ([facility] ) [address]  [place] [address]  [phone]     May 16, 2024  1:00 PM  (Arrive by 12:45 PM)  PHYSICAL with Amberly Whyte NP  [facility] ([facility] ) [address] [facility] [address]  [phone]             Routing refill request to provider for review/approval because:  Drug not on the G, P or [facility] refill protocol or controlled substance

## 2024-03-08 ENCOUNTER — VIRTUAL VISIT (OUTPATIENT)
Dept: PHARMACY | Facility: PHYSICIAN GROUP | Age: 59
End: 2024-03-08
Payer: COMMERCIAL

## 2024-03-08 DIAGNOSIS — E78.5 HYPERLIPIDEMIA, UNSPECIFIED HYPERLIPIDEMIA TYPE: ICD-10-CM

## 2024-03-08 DIAGNOSIS — I10 ESSENTIAL HYPERTENSION: ICD-10-CM

## 2024-03-08 DIAGNOSIS — R25.1 TREMOR: ICD-10-CM

## 2024-03-08 DIAGNOSIS — J43.9 PULMONARY EMPHYSEMA, UNSPECIFIED EMPHYSEMA TYPE (H): ICD-10-CM

## 2024-03-08 DIAGNOSIS — E11.9 TYPE 2 DIABETES, HBA1C GOAL < 7% (H): Primary | ICD-10-CM

## 2024-03-08 DIAGNOSIS — R52 PAIN: ICD-10-CM

## 2024-03-08 DIAGNOSIS — R21 RASH: ICD-10-CM

## 2024-03-08 DIAGNOSIS — Z78.9 TAKES DIETARY SUPPLEMENTS: ICD-10-CM

## 2024-03-08 PROCEDURE — 99605 MTMS BY PHARM NP 15 MIN: CPT | Mod: 93

## 2024-03-08 NOTE — PROGRESS NOTES
Medication Therapy Management (MTM) Encounter    ASSESSMENT:                            Medication Adherence/Access:   No issues identified    Diabetes:   Stable.  Patient is not meeting A1c goal of < 7%.  Self monitoring of blood glucose is at goal of fasting  mg/dL per patient report.   Patient follows with diabetic education for adjustment of her insulin pump.     Hypertension:   Stable. Patient is meeting blood pressure goal of < 130/80mmHg.    Hyperlipidemia:   Stable.    COPD:   Stable.   Plan in place with [facility] pulmonology.     Essential Tremor:  Stable.     Pain:    Stable.     Skin Rash:  Stable.     Supplements:   Stable.     PLAN:                            Continue your current medications at this time. Reach out with any medication questions or concerns.     Follow-up: 1 year, sooner if needed.     SUBJECTIVE/OBJECTIVE:                          Marly Cannon is a 58 year old female called for a follow-up visit from 09/20/2023.     Reason for visit: Initial visit - comprehensive medication review.    Allergies/ADRs: Reviewed in chart  Past Medical History: Reviewed in chart  Tobacco: She reports that she has been smoking cigarettes. She started smoking about 45 years ago. She has a 34 pack-year smoking history. She has been exposed to tobacco smoke. She has never used smokeless tobacco.Nicotine/Tobacco Cessation Plan  Information offered: Patient not interested at this time  Alcohol: none.  Caffeine: coffee - 2 cups daily, soda - 2 per day    Medication Adherence/Access: no issues reported  Patient uses pill box(es) set up by home care nurse.  Patient takes medications 2 time(s) per day.   Per patient, misses medication 0 times per week.   Medication barriers: none.   The patient fills medications at [place]: NO, fills medications at [facility].    Diabetes   Type 2 Diabetes:    - Jardiance 25 mg daily  - Novolog in insulin pump  - Toujeo 20 units at bedtime (for pump  failure)  - Aspirin 81 mg daily  Patient is not experiencing side effects.  Blood sugar monitoring: Continuous Glucose Monitor   Fasting - 89 mg/dL this morning per patient report  Current diabetes symptoms: none  Diet/Exercise: has been trying to walk more when it is nice out.   Patient follows with diabetic education for medication adjustment.     Eye exam is up to date  Foot exam is up to date  Urine Albumin:   Lab Results   Component Value Date    UMALCR 255.17 (H) 11/15/2023      Lab Results   Component Value Date    A1C 10.2 (H) 02/16/2024     Hypertension   - Amlodipine 5 mg daily  - Lisinopril 40 mg daily  Patient reports no current medication side effects  Patient has blood pressure monitored by home care nurse. Patient is unaware of her home blood pressure readings.      BP Readings from Last 3 Encounters:   02/16/24 118/78   02/07/24 135/84   12/14/23 124/70     Pulse Readings from Last 3 Encounters:   02/16/24 80   02/07/24 86   12/06/23 102     Hyperlipidemia   - Atorvastatin 40 mg daily  Patient reports no significant myalgias or other side effects.  The 10-year ASCVD risk score (Demetrice MATUTE, et al., 2019) is: 13.7%    Values used to calculate the score:      Age: 58 years      Sex: Female      Is Non- : No      Diabetic: Yes      Tobacco smoker: Yes      Systolic Blood Pressure: 118 mmHg      Is BP treated: Yes      HDL Cholesterol: 52 mg/dL      Total Cholesterol: 174 mg/dL     Recent Labs   Lab Test 07/19/23  1447 07/15/22  0857   CHOL 174 161   HDL 52 50   LDL 66 66   TRIG 278* 224*     COPD:   - LAMA/LABA - Stiolto Respimat 2.5-2.5 mcg/actuation 2 puffs into the lungs once daily  - Ipratropium and albuterol Respimat (Combivent) - not using currently  Patient reports no current medication side effects.    Patient reports the following symptoms: increased shortness of breath upon exertion.   Patient follows with pulmonology at [facility] for these medications.      Essential Tremor:  - Primidone 50 mg at bedtime  Patient reports no issues at this time.     Pain:    - Acetaminophen 325 mg every 6 hours as needed   Pain type/location: knee pain  Patient reports the following side effects: denies side effects.    Skin Rash:  - Nystatin 710868 unit/gram powder topically three times daily   Patient reports no issues at this time.     Supplements:   - Cholecalciferol 50 mcg daily  Patient reports no issues at this time.     Today's Vitals: There were no vitals taken for this visit.  ----------------  I spent 15 minutes with this patient today. All changes were made via collaborative practice agreement with Amberly Whyte NP. A copy of the visit note was provided to the patient's provider(s).    A summary of these recommendations was mailed to the patient.    John Chaudhary, PharmD  Medication Therapy Management Pharmacist  [facility]  Office phone: [phone]    Telemedicine Visit Details  Type of service:  Telephone visit  Start Time:  9:02 AM  End Time: 9:17 AM     Medication Therapy Recommendations  No medication therapy recommendations to display

## 2024-03-08 NOTE — LETTER
"Recommended To-Do List      Prepared on: 03/11/2024       You can get the best results from your medications by completing the items on this \"To-Do List.\"      Bring your To-Do List when you go to your doctor. And, share it with your family or caregivers.    My To-Do List:  What we talked about: What I should do:   What my medicines are for, how to know if my medicines are working, made sure my medicines are safe for me and reviewed how to take my medicines.     Take my medicines every day                "

## 2024-03-08 NOTE — PATIENT INSTRUCTIONS
"Recommendations from today's MTM visit:                                                      Continue your current medications at this time. Reach out with any medication questions or concerns.     Follow-up: 1 year, sooner if needed.     It was great speaking with you today.  I value your experience and would be very thankful for your time in providing feedback in our clinic survey. In the next few days, you may receive an email or text message from [employer] with a link to a survey related to your  clinical pharmacist.\"     To schedule another MTM appointment, please call the clinic directly or you may call the MTM scheduling line at [phone] or toll-free at [phone].     My Clinical Pharmacist's contact information:                                                      Please feel free to contact me with any questions or concerns you have.      John Chaudhary, PharmD  Medication Therapy Management Pharmacist  [facility]  Office phone: [phone]   "

## 2024-03-08 NOTE — LETTER
March 11, 2024  Marly Cannon  [address]  [place] [address]    Dear MsRosa M Davis, [facility] [place]     Thank you for talking with me on Mar 8, 2024 about your health and medications. As a follow-up to our conversation, I have included two documents:      Your Recommended To-Do List has steps you should take to get the best results from your medications.  Your Medication List will help you keep track of your medications and how to take them.    If you want to talk about these documents, please call John Chaudhary RPH at phone: [phone], Monday-Friday 8-4:30pm.    I look forward to working with you and your doctors to make sure your medications work well for you.    Sincerely,  John Chaudhary RPH MT Pharmacist, [facility]

## 2024-03-08 NOTE — LETTER
_  Medication List        Prepared on: 03/11/2024     Bring your Medication List when you go to the doctor, hospital, or   emergency room. And, share it with your family or caregivers.     Note any changes to how you take your medications.  Cross out medications when you no longer use them.    Medication How I take it Why I use it Prescriber   Acetaminophen (TYLENOL PO) Take 325 mg by mouth every 6 hours as needed  Pain Patient Reported   amLODIPine (NORVASC) 5 MG tablet TAKE 1 TABLET BY MOUTH DAILY Essential Hypertension Amberly Whyte NP   aspirin (ASPIRIN LOW DOSE) 81 MG chewable tablet CHEW AND SWALLOW 1 TABLET BY MOUTH DAILY Essential Hypertension Amberly Whyte NP   atorvastatin (LIPITOR) 40 MG tablet TAKE 1 TABLET BY MOUTH AT BEDTIME Hyperlipidemia, unspecified hyperlipidemia type Amberly Whyte NP   Cholecalciferol (VITAMIN D3) 50 MCG (2000 UT) CAPS TAKE 1 CAPSULE BY MOUTH DAILY Vitamin D Deficiency Amberly Whyte NP   empagliflozin (JARDIANCE) 25 MG TABS tablet TAKE 1 TABLET BY MOUTH DAILY Type 2 Diabetes, HbA1c Goal < 7% (H) Amberly Whyte NP   insulin aspart (NOVOLOG VIAL) 100 UNITS/ML vial To be used with the insulin pump.  TDD: 70 units Type 2 diabetes mellitus with hyperglycemia, with long-term current use of insulin (H) BLAIR Caruso CNP   Insulin Disposable Pump (OMNIPOD DASH PODS, GEN 4,) MISC 1 each every 48 hours Type 2 diabetes mellitus with hyperglycemia, with long-term current use of insulin (H) BLAIR Caruso CNP   insulin glargine U-300 (TOUJEO SOLOSTAR) 300 UNIT/ML (1 units dial) pen Inject 20 Units Subcutaneous At Bedtime (for pump failure) Type 2 diabetes BLAIR Caruso CNP   INSULIN PUMP - OUTPATIENT Insulin pumpOmnipod DashDate: 2/7/24Basal: 1.2Total basal: 28.8CR:15ISF: 50BG target: 120BG correction: 130Active insulin: 4 hours Type 2 diabetes mellitus with hyperglycemia, with long-term current use of insulin (H) BLAIR Caruso CNP   ipratropium-albuterol  (COMBIVENT RESPIMAT)  MCG/ACT inhaler INHALE 1 PUFF INTO THE LUNGS 4 TIMES DAILY Pulmonary emphysema, unspecified emphysema type (H) Amberly Whyte NP   lisinopril (ZESTRIL) 40 MG tablet TAKE 1 TABLET BY MOUTH DAILY Essential Hypertension Amberly Whyte NP   nystatin (MYCOSTATIN) 662521 UNIT/GM external powder Apply topically 3 times daily Candidiasis of breast Amberly Whyte NP   primidone (MYSOLINE) 50 MG tablet TAKE 1 TABLET BY MOUTH AT BEDTIME Essential Tremor Amberly Whyte NP   tiotropium-olodaterol 2.5-2.5 MCG/ACT AERS Inhale 2 puffs into the lungs daily Pulmonary Emphysema Patient Reported         Add new medications, over-the-counter drugs, herbals, vitamins, or  minerals in the blank rows below.    Medication How I take it Why I use it Prescriber                                      Allergies:      No Known Allergies        Side effects I have had:               Other Information:              My notes and questions:

## 2024-03-12 NOTE — OR NURSING
Referral to ENT for abnormal PFT's on Pulmonary visit on 3/05 /24 with CINDY Livingston DO.  Anesthesia would like ENT consult done before clearance for any elective procedures.  Notified PCP.

## 2024-03-12 NOTE — OR NURSING
Denies current use of NSAIDs, Instructed to hold ASA, vitamins & Supplements starting today 3/12, Hold Jardiance 3 days prioir & take amlodipine & lisinopril only morning of procedure.

## 2024-03-13 ENCOUNTER — TELEPHONE (OUTPATIENT)
Dept: FAMILY MEDICINE | Facility: OTHER | Age: 59
End: 2024-03-13

## 2024-03-13 NOTE — PROGRESS NOTES
"Preoperative Evaluation  [facility] [place]  [address]  [place] [address]  Phone: [phone]  Primary Provider: Kateryna Posada  Pre-op Performing Provider: KATERYNA POSADA  Mar 14, 2024   {Provider  Link to PREOP SmartSet  Use this to apply standard patient instructions to AVS; includes medication directions, common orders, guidelines for anemia, warfarin, additional testing   :422546}    Marly is a 58 year old, presenting for the following:  No chief complaint on file.    {(!) Visit Details have not yet been documented.  Please enter Visit Details and then use this list to pull in documentation. (Optional):928428}  Surgical Information  Surgery/Procedure: Coloscopy  Surgery Location: [facility]  Surgeon: Dr. Zayas  Surgery Date: 3/19/24  Time of Surgery: TBD  Where patient plans to recover: At home with family  Fax number for surgical facility: Note does not need to be faxed, will be available electronically in Epic.    Assessment & Plan     The proposed surgical procedure is considered LOW risk.    {Click here to pull in possible risk data after PreOp Qnr has been answered for this visit :216932}    {Risks and Recommendations :147450}    Antiplatelet or Anticoagulation Medication Instructions   - Patient is on no antiplatelet or anticoagulation medications.    Additional Medication Instructions  {Medication HOLD Times :776879}    Recommendation  {IMPORTANT - Approval:400705:}    {[facility] 2021 Documentation (Optional):885406}  {2021 E&M time (Optional):454345}    Subjective     HPI related to upcoming procedure: Last had a colonoscopy in 2019.       Per anesthesia noted 3/12: \"Pulmonary note ENT consult due to possible fixed upper airway obstruction. Will need to see ENT or at least have neck imaging to rule out obstruction prior to any elective anesthesia. Has appointment with Pato 3/14/24 and Cristina to notify primary about this. Gp 3/12/24\"     Mets >4: Can ***    {Click here to pull " in Questionnaire Data after Qnr completed :933438}  Health Care Directive  Patient does not have a Health Care Directive or Living Will: {ADVANCE_DIRECTIVE_STATUS:554263}    Preoperative Review of   {Mnpmpreport:784907}  {Review MNPMP for all patients per ICSI MNPMP Profile:691229}    Status of Chronic Conditions:  COPD - Patient has a longstanding history of moderate-severe COPD . Patient has been doing well overall noting {LUNG SX:545174} and continues on medication regimen consisting of *** without adverse reactions or side effects.    DIABETES - Patient has a longstanding history of DiabetesType {DIABETES TYPES:233276} . Patient is being treated with diet, exercise, insulin injections, and insulin pump and denies significant side effects. Control has been fair. Complicating factors include but are not limited to: hypertension, hyperlipidemia, chronic kidney disease, and tobacco use.     HYPERLIPIDEMIA - Patient has a long history of significant Hyperlipidemia requiring medication for treatment with recent {G/F/P:748150} control. Patient reports { :652213} problems or side effects with the medication.     HYPERTENSION - Patient has longstanding history of HTN , currently denies any symptoms referable to elevated blood pressure. Specifically denies chest pain, palpitations, dyspnea, orthopnea, PND or peripheral edema. Blood pressure readings {HAVE:696120} been in normal range. Current medication regimen is as listed below. Patient denies any side effects of medication.     TOBACCO Abuse: smokes 1 ppd x34 years  ADEEL: uses CPAP***  ***    Patient Active Problem List    Diagnosis Date Noted    Diabetic polyneuropathy associated with diabetes mellitus due to underlying condition (H) 01/17/2023     Priority: Medium    Urinary incontinence, unspecified type 01/17/2023     Priority: Medium    ADEEL (obstructive sleep apnea) 02/16/2021     Priority: Medium     Interp for PSG dated 12/16/2020.     Weight 202 lbs.  Total  sleep time 417.5 minutes, sleep efficiency 83%, sleep latency 15 minutes, REM latency - minutes.  Arousal index 45.6.  Sleep architecture was incredibly fragmented with no clear REM observed.     AHI 43.1, events appearing primarily obstructive in nature.  Mean Spo2 86%, isabel SpO2 78%, 96.2% of recording was <= 89%.       EKG appeared NSR.     No PLM's observed.      Unable to assess for phasic or tonic EMG activity in REM.  No abnormal nocturnal behaviors observed.     Assessment:  - Severe ADEEL with severe sleep-associated hypoxemia and sustained hypoxemia, with increased concern for hypoventilation.  - Potential that severity under-reported given highly fragmented sleep architecture and no clear REM observed.      Memory disturbance 02/13/2020     Priority: Medium    Callus of foot 05/29/2019     Priority: Medium    Hyperlipidemia, unspecified hyperlipidemia type 05/28/2019     Priority: Medium    Essential hypertension 05/28/2019     Priority: Medium    H/O colonoscopy 04/24/2019     Priority: Medium     Had in 10/2018, due for repeat 5 years      [place] cardiac risk <10% in next 10 years 02/22/2019     Priority: Medium     Overview:   Started high intensity statin.       Tubular adenoma of colon 09/28/2018     Priority: Medium    Intellectual disability 08/01/2017     Priority: Medium    ACP (advance care planning) 09/09/2016     Priority: Medium     Advance Care Planning 9/9/2016: ACP Review of Chart / Resources Provided:  Reviewed chart for advance care plan.  Marly Cannon has been provided information and resources to begin or update their advance care plan.  Added by Naty Allen            Hyperparathyroidism due to renal insufficiency (H24) 06/14/2016     Priority: Medium    Vitamin D deficiency 06/14/2016     Priority: Medium    Microalbuminuria due to type 2 diabetes mellitus (H) 06/14/2016     Priority: Medium    Stage 3 chronic kidney disease (H) 02/12/2016     Priority: Medium      Overview:   Updated per 10/1/17 IMO import      Pulmonary emphysema (H) 08/21/2015     Priority: Medium     Confirmed via PFTs in 8/2015      Type 2 diabetes, HbA1c goal < 7% (H) 07/24/2015     Priority: Medium    Tobacco abuse 07/09/2015     Priority: Medium    Tremor 11/09/2009     Priority: Medium     Formatting of this note might be different from the original.  Shakes head      Breast fibrocystic disorder 02/22/2008     Priority: Medium     Overview:   IMO Update 10/11        No past medical history on file.  Past Surgical History:   Procedure Laterality Date    BIOPSY BREAST Left 02/14/2008    patient states no bx, Clip in left breast/ stereo bxc 2-- no path in EPIC that far back    COLONOSCOPY N/A 08/20/2015    Procedure: COLONOSCOPY;  Surgeon: Gentry [place] MD;  Location: [facility] OR    COLONOSCOPY N/A 09/21/2018    Procedure: COLONOSCOPY;  COLONOSCOPY WITH POLYPECTOMY;  Surgeon: Gentry [place] MD;  Location: [facility] OR     Current Outpatient Medications   Medication Sig Dispense Refill    Acetaminophen (TYLENOL PO) Take 325 mg by mouth every 6 hours as needed       amLODIPine (NORVASC) 5 MG tablet TAKE 1 TABLET BY MOUTH DAILY 90 tablet 0    aspirin (ASPIRIN LOW DOSE) 81 MG chewable tablet CHEW AND SWALLOW 1 TABLET BY MOUTH DAILY 90 tablet 0    atorvastatin (LIPITOR) 40 MG tablet TAKE 1 TABLET BY MOUTH AT BEDTIME 90 tablet 3    blood glucose (ONETOUCH ULTRA) test strip USE TO TEST BLOOD SUGAR 4 TIMES DAILY 100 strip 4    Cholecalciferol (VITAMIN D3) 50 MCG (2000 UT) CAPS TAKE 1 CAPSULE BY MOUTH DAILY 90 capsule 3    Continuous Blood Gluc Sensor (FREESTYLE HANK 2 SENSOR) [employer] [facility] CHANGE 1 SENSOR EVERY 14 DAYS, USE TO READ BLOOD SUGARS PER MANUFACTURERS INSTRUCTIONS 2 each 11    empagliflozin (JARDIANCE) 25 MG TABS tablet TAKE 1 TABLET BY MOUTH DAILY 30 tablet 5    insulin aspart (NOVOLOG VIAL) 100 UNITS/ML vial To be used with the insulin pump.  TDD: 70 units 30 mL 3    Insulin Disposable Pump (OMNIPOD  DASH PODS, GEN 4,) MISC 1 each every 48 hours 15 each 5    insulin glargine U-300 (TOUJEO SOLOSTAR) 300 UNIT/ML (1 units dial) pen Inject 20 Units Subcutaneous At Bedtime 9 mL 1    insulin pen needle (32G X 4 MM) 32G X 4 MM miscellaneous Use 1 pen needles daily 100 each 3    INSULIN PUMP - OUTPATIENT Insulin pump  Omnipod Dash  Date: 2/7/24  Basal: 1.2  Total basal: 28.8  CR:15  ISF: 50  BG target: 120  BG correction: 130  Active insulin: 4 hours      ipratropium-albuterol (COMBIVENT RESPIMAT)  MCG/ACT inhaler INHALE 1 PUFF INTO THE LUNGS 4 TIMES DAILY (Patient not taking: Reported on 3/8/2024) 4 g 9    lisinopril (ZESTRIL) 40 MG tablet TAKE 1 TABLET BY MOUTH DAILY 90 tablet 3    nystatin (MYCOSTATIN) 447392 UNIT/GM external powder Apply topically 3 times daily (Patient taking differently: Apply topically 3 times daily as needed) 60 g 3    OneTouch Delica Lancets 33G MISC Inject 1 each Subcutaneous 3 times daily (before meals) 100 each 11    order for DME Women briefs 50 each 1    primidone (MYSOLINE) 50 MG tablet TAKE 1 TABLET BY MOUTH AT BEDTIME 30 tablet 11    tiotropium-olodaterol 2.5-2.5 MCG/ACT AERS Inhale 2 puffs into the lungs daily         No Known Allergies     Social History     Tobacco Use    Smoking status: Every Day     Packs/day: 1.00     Years: 34.00     Additional pack years: 0.00     Total pack years: 34.00     Types: Cigarettes     Start date: 1/1/1979     Passive exposure: Current    Smokeless tobacco: Never    Tobacco comments:     Declined QP 05/13/2020   Substance Use Topics    Alcohol use: No     Alcohol/week: 0.0 standard drinks of alcohol     Family History   Problem Relation Age of Onset    Diabetes Mother     Diabetes Father     Hypertension Brother     Diabetes Sister      History   Drug Use No         Review of Systems  {ROS Picklists (Optional):785132}    Objective    There were no vitals taken for this visit.   Estimated body mass index is 32.61 kg/m  as calculated from the  "following:    Height as of 24: 1.626 m (5' 4\").    Weight as of 24: 86.2 kg (190 lb).    Physical Exam  {Exam List (Optional):072593}    Recent Labs   Lab Test 24  1343 11/15/23  1400 23  1447   HGB  --  17.3* 17.3*   PLT  --  258 300    134* 135*   POTASSIUM 4.1 4.4 4.6   CR 1.05* 1.15* 1.23*   A1C 10.2* 11.0* 13.0*      Diagnostics  {LABS:221186}   {EK}    Revised Cardiac Risk Index (RCRI)  The patient has the following serious cardiovascular risks for perioperative complications:   - Diabetes Mellitus (on Insulin) = 1 point     RCRI Interpretation: 1 point: Class II (low risk - 0.9% complication rate)     ORLANDO Pedraza-S2  Signed Electronically by: Amberly Whyte NP  Copy of this evaluation report is provided to requesting physician.    {Provider Resources  Preop [facility] Guidelines  Revised Cardiac Risk Index :731611}   {Email feedback regarding this note to [email or web address]   :755958}  "

## 2024-03-13 NOTE — TELEPHONE ENCOUNTER
2:43 PM    Reason for Call: Phone Call    Description: Patient reached out stating that she received a call, but no VM was left. She stated that it may have been regarding a coloscopy scheduled for 3/19 (writer does not see this appt). Please give her a call back.     Was an appointment offered for this call? No  If yes : Appointment type              Date    Preferred method for responding to this message: Telephone Call  What is your phone number? [phone]    If we cannot reach you directly, may we leave a detailed response at the number you provided? Yes    Can this message wait until your PCP/provider returns, if available today? Not applicable    Taryn Pierce

## 2024-03-14 ENCOUNTER — OFFICE VISIT (OUTPATIENT)
Dept: FAMILY MEDICINE | Facility: OTHER | Age: 59
End: 2024-03-14
Attending: NURSE PRACTITIONER
Payer: COMMERCIAL

## 2024-03-14 ENCOUNTER — TELEPHONE (OUTPATIENT)
Dept: FAMILY MEDICINE | Facility: OTHER | Age: 59
End: 2024-03-14

## 2024-03-14 VITALS
DIASTOLIC BLOOD PRESSURE: 70 MMHG | SYSTOLIC BLOOD PRESSURE: 110 MMHG | HEART RATE: 100 BPM | TEMPERATURE: 97.5 F | HEIGHT: 64 IN | BODY MASS INDEX: 32.61 KG/M2 | WEIGHT: 191 LBS | OXYGEN SATURATION: 93 %

## 2024-03-14 DIAGNOSIS — Z01.818 PREOP GENERAL PHYSICAL EXAM: ICD-10-CM

## 2024-03-14 DIAGNOSIS — G47.34 NOCTURNAL HYPOXIA: Primary | ICD-10-CM

## 2024-03-14 DIAGNOSIS — Z72.0 TOBACCO ABUSE: ICD-10-CM

## 2024-03-14 DIAGNOSIS — R94.2 ABNORMAL PFT: ICD-10-CM

## 2024-03-14 DIAGNOSIS — D58.2 ELEVATED HEMOGLOBIN (H): ICD-10-CM

## 2024-03-14 PROCEDURE — G0463 HOSPITAL OUTPT CLINIC VISIT: HCPCS

## 2024-03-14 PROCEDURE — 99213 OFFICE O/P EST LOW 20 MIN: CPT | Performed by: NURSE PRACTITIONER

## 2024-03-14 RX ORDER — ASPIRIN 81 MG/1
TABLET, CHEWABLE ORAL
Qty: 90 TABLET | Refills: 0 | Status: SHIPPED | OUTPATIENT
Start: 2024-03-14 | End: 2024-09-11

## 2024-03-14 RX ORDER — AMLODIPINE BESYLATE 5 MG/1
5 TABLET ORAL DAILY
Qty: 90 TABLET | Refills: 0 | Status: SHIPPED | OUTPATIENT
Start: 2024-03-14 | End: 2024-05-16

## 2024-03-14 ASSESSMENT — PAIN SCALES - GENERAL: PAINLEVEL: NO PAIN (0)

## 2024-03-14 NOTE — TELEPHONE ENCOUNTER
Faxed preop notes to Dr. Zayas [phone]  wrote on cover sheet in all caps patient is NOT cleared for procedure on 3/19

## 2024-03-14 NOTE — PROGRESS NOTES
Assessment & Plan     Tobacco abuse  Nocturnal hypoxia  Elevated hemoglobin (H24)  Abnormal PFT  Preop general physical exam  Patient was scheduled for screening colonoscopy on 3/19/24 with Dr. Zayas. Anesthesia, however, reviewed her chart and does not feel comfortable operating. Patient was seen on 3/5/24 by pulmonary for COPD. She is a heavy smoker, but pulmonary does not feel she has COPD. They are worried about fixed upper airway obstruction and patient was sent to ENT. She tells me today that she has yet to schedule with ENT. Patient has limited transportation to attend appointments in [place]. Sent in-house ENT referral. Patient will follow up to schedule. Will complete pre-op after ENT evaluation. Smoking 1 ppd. Cessation encouraged.     Prescription drug management    Mike Luke is a 58 year old, presenting for the following health issues:  Pre-Op Exam    HPI     Pre-op Clearance   Patient presents today for which was suppose to be for pre-op clearance.    Patient is suppose to have a screening colonoscopy done on 3/19/24 with Dr. Zayas.     Anesthesia, however, reviewed her chart and does not feel comfortable operating.     Patient was seen on 3/5/24 by pulmonary for COPD. She is a heavy smoker, but pulmonary does not feel she has COPD. They are worried about a fixed upper airway obstruction and was sent to ENT. She tells me today that she has yet to schedule with ENT.      ENT not yet scheduled but will to do so. Was unaware she needed to see them. Transportation to [place] is difficult.     CT chest was completed at [facility] on 3/13-unremarkable    No shortness of breath, no chest pain, no dizziness or syncope, no palpitations. No trouble swallowing.   Smoking 1 ppd     Stopped Aspirin and vit D for surgery  -needs Norvasc and aspirin refills  -Due for several immunizations: Declines    Review of Systems  Constitutional, HEENT, cardiovascular, pulmonary, gi and gu systems are  "negative, except as otherwise noted.      Objective    /70   Pulse 110   Temp 97.5  F (36.4  C) (Tympanic)   Ht 1.626 m (5' 4\")   Wt 86.6 kg (191 lb)   SpO2 93%   BMI 32.79 kg/m    Body mass index is 32.79 kg/m .    Physical Exam   GENERAL: alert and no distress  EYES: Eyes grossly normal to inspection, PERRL and conjunctivae and sclerae normal  HENT: ear canals and TM's normal, nose and mouth without ulcers or lesions. Mildly cyanotic lips and nose.  NECK: no adenopathy, no asymmetry, masses, or scars  RESP: lungs clear to auscultation - no rales, rhonchi or wheezes.  CV: Rate mildly increased. Regular rhythm, normal S1 S2, no S3 or S4, no murmur, click or rub, no peripheral edema  MS: no gross musculoskeletal defects noted, no edema  SKIN: no suspicious lesions or rashes.  NEURO: mentation intact and speech normal  PSYCH: affect normal/bright      ORLANDO Pedraza-S2     I was present with the nurse practitioner student who participated in the service and in the documentation of the note. I have verified the history and personally performed the physical exam and medical decision making. I agree with the assessment and plan of care as documented in the note.     Amberly Whyte CNP  "

## 2024-03-19 ENCOUNTER — MEDICAL CORRESPONDENCE (OUTPATIENT)
Dept: HEALTH INFORMATION MANAGEMENT | Facility: HOSPITAL | Age: 59
End: 2024-03-19

## 2024-03-20 DIAGNOSIS — Z53.9 PERSONS ENCOUNTERING HEALTH SERVICES FOR SPECIFIC PROCEDURES, NOT CARRIED OUT: Primary | ICD-10-CM

## 2024-03-20 PROCEDURE — G0179 MD RECERTIFICATION HHA PT: HCPCS | Performed by: NURSE PRACTITIONER

## 2024-03-28 ENCOUNTER — TELEPHONE (OUTPATIENT)
Dept: FAMILY MEDICINE | Facility: OTHER | Age: 59
End: 2024-03-28

## 2024-03-28 DIAGNOSIS — E11.65 TYPE 2 DIABETES MELLITUS WITH HYPERGLYCEMIA, WITH LONG-TERM CURRENT USE OF INSULIN (H): Primary | ICD-10-CM

## 2024-03-28 DIAGNOSIS — Z79.4 TYPE 2 DIABETES MELLITUS WITH HYPERGLYCEMIA, WITH LONG-TERM CURRENT USE OF INSULIN (H): Primary | ICD-10-CM

## 2024-04-04 NOTE — PROGRESS NOTES
Otolaryngology Consultation    Patient: Marly Cannon  : 1965    Patient presents with:  Ent Problem: Abnormal PFT- Amberly Whyte NP referring      HPI:  Marly Cannon is a 58 year old female seen today for concerns for COPD and abnormal pulmonary function test.  Patient has been seen by pulmonology who prompted ENT referral.  She has diagnosis of COPD and also recommended BiPAP.  Patient was seen by her Primary physicain and referred to ENT. She is a heavy smoker but pulmonary does not feel she has COPD. They were concerned for upper airway obstruction.   CT chest was completed at [facility] on 3/13-unremarkable  She has a current 40-pack-year smoker with no intention to quit.    Marly states she presents for ENT exam. She has no sore throat or throat pain.     Patient denies sore throat or throat pain. Denies oral sores or lesions.   Denies chronic otalgia.   Denies fevers, night sweats or weight loss.   Denies dysphagia or dysphonia.   Denies globus sensation.   Denies nasal congestion, allergies.   Denies CRS, post nasal drainage.   Reports breathing has been well. She has been using inhaler with good results.   She has been using her albuterol less since starting new inhaler.     Water- one bottle daily.   Caffeine- 2-3 cups coffee.   ETOH- None  Tobacco- 1 ppd.   Reflux- Intermittent, rare.     Current Outpatient Rx   Medication Sig Dispense Refill    Acetaminophen (TYLENOL PO) Take 325 mg by mouth every 6 hours as needed  (Patient not taking: Reported on 3/14/2024)      amLODIPine (NORVASC) 5 MG tablet Take 1 tablet (5 mg) by mouth daily 90 tablet 0    aspirin (ASPIRIN LOW DOSE) 81 MG chewable tablet CHEW AND SWALLOW 1 TABLET BY MOUTH DAILY 90 tablet 0    atorvastatin (LIPITOR) 40 MG tablet TAKE 1 TABLET BY MOUTH AT BEDTIME 90 tablet 3    blood glucose (ONETOUCH ULTRA) test strip USE TO TEST BLOOD SUGAR 4 TIMES DAILY 100 strip 4    Cholecalciferol (VITAMIN D3) 50 MCG ( UT) CAPS TAKE  1 CAPSULE BY MOUTH DAILY (Patient not taking: Reported on 3/14/2024) 90 capsule 3    Continuous Blood Gluc Sensor (FREESTYLE HANK 2 SENSOR) MISC CHANGE 1 SENSOR EVERY 14 DAYS, USE TO READ BLOOD SUGARS PER MANUFACTURERS INSTRUCTIONS 2 each 11    empagliflozin (JARDIANCE) 25 MG TABS tablet TAKE 1 TABLET BY MOUTH DAILY (Patient not taking: Reported on 3/14/2024) 30 tablet 5    insulin aspart (NOVOLOG VIAL) 100 UNITS/ML vial To be used with the insulin pump.  TDD: 70 units 30 mL 3    Insulin Disposable Pump (OMNIPOD DASH PODS, GEN 4,) MISC 1 each every 48 hours 15 each 5    insulin glargine U-300 (TOUJEO SOLOSTAR) 300 UNIT/ML (1 units dial) pen Inject 20 Units Subcutaneous At Bedtime 9 mL 1    insulin pen needle (32G X 4 MM) 32G X 4 MM miscellaneous Use 1 pen needles daily 100 each 3    INSULIN PUMP - OUTPATIENT Insulin pump  Omnipod Dash  Date: 2/7/24  Basal: 1.2  Total basal: 28.8  CR:15  ISF: 50  BG target: 120  BG correction: 130  Active insulin: 4 hours      ipratropium-albuterol (COMBIVENT RESPIMAT)  MCG/ACT inhaler INHALE 1 PUFF INTO THE LUNGS 4 TIMES DAILY 4 g 9    lisinopril (ZESTRIL) 40 MG tablet TAKE 1 TABLET BY MOUTH DAILY 90 tablet 3    nystatin (MYCOSTATIN) 192737 UNIT/GM external powder Apply topically 3 times daily (Patient taking differently: Apply topically 3 times daily as needed) 60 g 3    OneTouch Delica Lancets 33G MISC Inject 1 each Subcutaneous 3 times daily (before meals) 100 each 11    order for DME Women briefs 50 each 1    primidone (MYSOLINE) 50 MG tablet TAKE 1 TABLET BY MOUTH AT BEDTIME 30 tablet 11    tiotropium-olodaterol 2.5-2.5 MCG/ACT AERS Inhale 2 puffs into the lungs daily         Allergies: Patient has no known allergies.     No past medical history on file.    Past Surgical History:   Procedure Laterality Date    BIOPSY BREAST Left 02/14/2008    patient states no bx, Clip in left breast/ stereo bxc 2-- no path in EPIC that far back    COLONOSCOPY N/A 08/20/2015     "Procedure: COLONOSCOPY;  Surgeon: Gentyr Porter MD;  Location: [facility]    COLONOSCOPY N/A 09/21/2018    Procedure: COLONOSCOPY;  COLONOSCOPY WITH POLYPECTOMY;  Surgeon: Gentry Porter MD;  Location: [facility] OR       ENT family history reviewed    Social History     Tobacco Use    Smoking status: Every Day     Packs/day: 1.00     Years: 34.00     Additional pack years: 0.00     Total pack years: 34.00     Types: Cigarettes     Start date: 1/1/1979     Passive exposure: Current    Smokeless tobacco: Never    Tobacco comments:     Declined QP 05/13/2020   Vaping Use    Vaping Use: Never used   Substance Use Topics    Alcohol use: No     Alcohol/week: 0.0 standard drinks of alcohol    Drug use: No       Review of Systems  ROS: 10 point ROS neg other than the symptoms noted above in the HPI     Physical Exam  /68 (BP Location: Left arm, Cuff Size: Adult Large)   Pulse 87   Temp 97.9  F (36.6  C) (Tympanic)   Resp 20   Ht 1.626 m (5' 4\")   Wt 86.6 kg (191 lb)   SpO2 91%   BMI 32.79 kg/m      General - The patient is well nourished and well developed, and appears to have good nutritional status.  Alert and oriented to person and place, answers questions and cooperates with examination appropriately.   Head and Face - Normocephalic and atraumatic, with no gross asymmetry noted.  The facial nerve is intact, with strong symmetric movements.  Microgenia, significant mandible changes noted.  Limiting oral airway  Voice and Breathing - The patient was breathing comfortably without the use of accessory muscles. There was no wheezing, stridor, or stertor.  The patients voice was clear and strong, and had appropriate pitch and quality.  On examination of the ears, I found that the ear(s) were completely impacted with dense cerumen.  Therefore, I positioned them in the examination chair in a semi-supine position, beginning with the right side.  I used the binocular surgical microscope to perform cerumen removal.  " I began by using a cerumen loop to gently lift the edges of the cerumen mass away from the walls of the external canal.  Once I did this, I was able to suction away fragments of wax and debris using suction.  Once the mass was loose enough, the entire plug was pulled from the canal.  The tympanic membrane was intact, no sign of perforation or middle ear effusion.    I turned my attention to the contralateral side once again using the binocular surgical microscope to perform cerumen removal.  I began by using a cerumen loop to gently lift the edges of the cerumen mass away from the walls of the external canal.  Once I did this, I was able to suction away fragments of wax and debris using suction.  Once the mass was loose enough, the entire plug was pulled from the canal.  The tympanic membrane was intact, no sign of perforation or middle ear effusion.    Eyes - Extraocular movements intact, and the pupils were reactive to light.  Sclera were not icteric or injected, conjunctiva were pink and moist.  Mouth - Examination of the oral cavity showed pink, healthy oral mucosa. No lesions or ulcerations noted.  The tongue was mobile and midline, and edentulous  Patient is hard to obtain a good oral exam due to microgenia and FTP III  Throat - The walls of the oropharynx were smooth, pink, moist, symmetric, and had no lesions or ulcerations.  The tonsillar pillars and soft palate were symmetric.  The uvula was midline on elevation.    Neck - Normal midline excursion of the laryngotracheal complex during swallowing.  Full range of motion on passive movement.  Palpation of the occipital, submental, submandibular, internal jugular chain, and supraclavicular nodes did not demonstrate any abnormal lymph nodes or masses.  Palpation of the thyroid was soft and smooth, with no nodules or goiter appreciated.  The trachea was mobile and midline.  Nose - External contour is symmetric, no gross deflection or scars.  Nasal mucosa is pink  and moist with no abnormal mucus.      Flexible Endoscopy -     The patient was counseled that their symptoms and history require a direct visualization with an endoscopy procedure.  They understood and we proceeded with a fiberoptic examination.  First I sprayed both sides of the nose with a mixture of lidocaine and neosynephrine.  I then passed the scope through the nasal cavity.  The nasal cavity was unremarkable.  The nasopharynx was mucosally covered and symmetric.  The Eustachian tube openings were unobstructed.  Going further down I had a clear view of the base of tongue which had normal appearing lingual tonsillar tissue.  The base of tongue was free of lesions, and the vallecula was open.  The epiglottis was smooth and mucosally covered.  The supraglottic larynx was then clearly visualized.  The vocal cords moved smoothly and symmetrically, they were pearly white and no lesions were seen.  The pyriform sinuses were open, and the limited view of the postcricoid region did not show any lesions.      Impression and Plan- Marlyausten Cannon is a 58 year old female with:        ICD-10-CM    1. Microgenia  M26.06 CT Soft Tissue Neck w/o & w Contrast      2. Abnormal PFT  R94.2 lidocaine 2%-oxymetazoline 0.025% nasal solution 2 spray     Adult ENT  Referral     CT Soft Tissue Neck w/o & w Contrast      3. Globus sensation  R09.A2 CT Soft Tissue Neck w/o & w Contrast      4. ADEEL (obstructive sleep apnea)  G47.33 CT Soft Tissue Neck w/o & w Contrast      5. Bilateral impacted cerumen  H61.23         No endolaryngeal mass seen upon examination today however, she does have microgenia and limited oral airway due to positioning and anatomy.  Will complete imaging,     She was recommended to work on tobacco cessation.  She declined at this time.    She was highly recommended to start CPAP at this time.  Imaging to be completed.        April Forrester PA-C  ENT  [facility], [place]

## 2024-04-05 ENCOUNTER — OFFICE VISIT (OUTPATIENT)
Dept: OTOLARYNGOLOGY | Facility: OTHER | Age: 59
End: 2024-04-05
Attending: PHYSICIAN ASSISTANT
Payer: COMMERCIAL

## 2024-04-05 VITALS
TEMPERATURE: 97.9 F | OXYGEN SATURATION: 91 % | WEIGHT: 191 LBS | BODY MASS INDEX: 32.61 KG/M2 | DIASTOLIC BLOOD PRESSURE: 68 MMHG | RESPIRATION RATE: 20 BRPM | SYSTOLIC BLOOD PRESSURE: 138 MMHG | HEIGHT: 64 IN | HEART RATE: 87 BPM

## 2024-04-05 DIAGNOSIS — R09.A2 GLOBUS SENSATION: ICD-10-CM

## 2024-04-05 DIAGNOSIS — M26.06 MICROGENIA: Primary | ICD-10-CM

## 2024-04-05 DIAGNOSIS — R94.2 ABNORMAL PFT: ICD-10-CM

## 2024-04-05 DIAGNOSIS — G47.33 OSA (OBSTRUCTIVE SLEEP APNEA): ICD-10-CM

## 2024-04-05 DIAGNOSIS — H61.23 BILATERAL IMPACTED CERUMEN: ICD-10-CM

## 2024-04-05 PROCEDURE — 31575 DIAGNOSTIC LARYNGOSCOPY: CPT | Performed by: PHYSICIAN ASSISTANT

## 2024-04-05 PROCEDURE — G0463 HOSPITAL OUTPT CLINIC VISIT: HCPCS | Mod: 25

## 2024-04-05 PROCEDURE — 99213 OFFICE O/P EST LOW 20 MIN: CPT | Mod: 25 | Performed by: PHYSICIAN ASSISTANT

## 2024-04-05 ASSESSMENT — PAIN SCALES - GENERAL: PAINLEVEL: NO PAIN (0)

## 2024-04-05 NOTE — LETTER
2024         RE: Marly Cannon   [address]  [place] [address]        Dear Colleague,    Thank you for referring your patient, Marly Cannon, to the [facility] [place]. Please see a copy of my visit note below.    Otolaryngology Consultation    Patient: Marly Cannon  : 1965    Patient presents with:  Ent Problem: Abnormal PFT- Amberly Whyte NP referring      HPI:  Marly Cannon is a 58 year old female seen today for concerns for COPD and abnormal pulmonary function test.  Patient has been seen by pulmonology who prompted ENT referral.  She has diagnosis of COPD and also recommended BiPAP.  Patient was seen by her Primary physicain and referred to ENT. She is a heavy smoker but pulmonary does not feel she has COPD. They were concerned for upper airway obstruction.   CT chest was completed at [facility] on 3/13-unremarkable  She has a current 40-pack-year smoker with no intention to quit.    Marly states she presents for ENT exam. She has no sore throat or throat pain.     Patient denies sore throat or throat pain. Denies oral sores or lesions.   Denies chronic otalgia.   Denies fevers, night sweats or weight loss.   Denies dysphagia or dysphonia.   Denies globus sensation.   Denies nasal congestion, allergies.   Denies CRS, post nasal drainage.   Reports breathing has been well. She has been using inhaler with good results.   She has been using her albuterol less since starting new inhaler.     Water- one bottle daily.   Caffeine- 2-3 cups coffee.   ETOH- None  Tobacco- 1 ppd.   Reflux- Intermittent, rare.     Current Outpatient Rx   Medication Sig Dispense Refill     Acetaminophen (TYLENOL PO) Take 325 mg by mouth every 6 hours as needed  (Patient not taking: Reported on 3/14/2024)       amLODIPine (NORVASC) 5 MG tablet Take 1 tablet (5 mg) by mouth daily 90 tablet 0     aspirin (ASPIRIN LOW DOSE) 81 MG chewable tablet CHEW AND SWALLOW 1 TABLET BY MOUTH DAILY  90 tablet 0     atorvastatin (LIPITOR) 40 MG tablet TAKE 1 TABLET BY MOUTH AT BEDTIME 90 tablet 3     blood glucose (ONETOUCH ULTRA) test strip USE TO TEST BLOOD SUGAR 4 TIMES DAILY 100 strip 4     Cholecalciferol (VITAMIN D3) 50 MCG (2000 UT) CAPS TAKE 1 CAPSULE BY MOUTH DAILY (Patient not taking: Reported on 3/14/2024) 90 capsule 3     Continuous Blood Gluc Sensor (FREESTYLE HANK 2 SENSOR) MISC CHANGE 1 SENSOR EVERY 14 DAYS, USE TO READ BLOOD SUGARS PER MANUFACTURERS INSTRUCTIONS 2 each 11     empagliflozin (JARDIANCE) 25 MG TABS tablet TAKE 1 TABLET BY MOUTH DAILY (Patient not taking: Reported on 3/14/2024) 30 tablet 5     insulin aspart (NOVOLOG VIAL) 100 UNITS/ML vial To be used with the insulin pump.  TDD: 70 units 30 mL 3     Insulin Disposable Pump (OMNIPOD DASH PODS, GEN 4,) MISC 1 each every 48 hours 15 each 5     insulin glargine U-300 (TOUJEO SOLOSTAR) 300 UNIT/ML (1 units dial) pen Inject 20 Units Subcutaneous At Bedtime 9 mL 1     insulin pen needle (32G X 4 MM) 32G X 4 MM miscellaneous Use 1 pen needles daily 100 each 3     INSULIN PUMP - OUTPATIENT Insulin pump  Omnipod Dash  Date: 2/7/24  Basal: 1.2  Total basal: 28.8  CR:15  ISF: 50  BG target: 120  BG correction: 130  Active insulin: 4 hours       ipratropium-albuterol (COMBIVENT RESPIMAT)  MCG/ACT inhaler INHALE 1 PUFF INTO THE LUNGS 4 TIMES DAILY 4 g 9     lisinopril (ZESTRIL) 40 MG tablet TAKE 1 TABLET BY MOUTH DAILY 90 tablet 3     nystatin (MYCOSTATIN) 683061 UNIT/GM external powder Apply topically 3 times daily (Patient taking differently: Apply topically 3 times daily as needed) 60 g 3     OneTouch Delica Lancets 33G MISC Inject 1 each Subcutaneous 3 times daily (before meals) 100 each 11     order for DME Women briefs 50 each 1     primidone (MYSOLINE) 50 MG tablet TAKE 1 TABLET BY MOUTH AT BEDTIME 30 tablet 11     tiotropium-olodaterol 2.5-2.5 MCG/ACT AERS Inhale 2 puffs into the lungs daily         Allergies: Patient has no known  "allergies.     No past medical history on file.    Past Surgical History:   Procedure Laterality Date     BIOPSY BREAST Left 02/14/2008    patient states no bx, Clip in left breast/ stereo bxc 2-- no path in EPIC that far back     COLONOSCOPY N/A 08/20/2015    Procedure: COLONOSCOPY;  Surgeon: Gentry Porter MD;  Location: [facility]     COLONOSCOPY N/A 09/21/2018    Procedure: COLONOSCOPY;  COLONOSCOPY WITH POLYPECTOMY;  Surgeon: Gentry Porter MD;  Location: [facility] OR       ENT family history reviewed    Social History     Tobacco Use     Smoking status: Every Day     Packs/day: 1.00     Years: 34.00     Additional pack years: 0.00     Total pack years: 34.00     Types: Cigarettes     Start date: 1/1/1979     Passive exposure: Current     Smokeless tobacco: Never     Tobacco comments:     Declined QP 05/13/2020   Vaping Use     Vaping Use: Never used   Substance Use Topics     Alcohol use: No     Alcohol/week: 0.0 standard drinks of alcohol     Drug use: No       Review of Systems  ROS: 10 point ROS neg other than the symptoms noted above in the HPI     Physical Exam  /68 (BP Location: Left arm, Cuff Size: Adult Large)   Pulse 87   Temp 97.9  F (36.6  C) (Tympanic)   Resp 20   Ht 1.626 m (5' 4\")   Wt 86.6 kg (191 lb)   SpO2 91%   BMI 32.79 kg/m      General - The patient is well nourished and well developed, and appears to have good nutritional status.  Alert and oriented to person and place, answers questions and cooperates with examination appropriately.   Head and Face - Normocephalic and atraumatic, with no gross asymmetry noted.  The facial nerve is intact, with strong symmetric movements.  Microgenia, significant mandible changes noted.  Limiting oral airway  Voice and Breathing - The patient was breathing comfortably without the use of accessory muscles. There was no wheezing, stridor, or stertor.  The patients voice was clear and strong, and had appropriate pitch and quality.  On " examination of the ears, I found that the ear(s) were completely impacted with dense cerumen.  Therefore, I positioned them in the examination chair in a semi-supine position, beginning with the right side.  I used the binocular surgical microscope to perform cerumen removal.  I began by using a cerumen loop to gently lift the edges of the cerumen mass away from the walls of the external canal.  Once I did this, I was able to suction away fragments of wax and debris using suction.  Once the mass was loose enough, the entire plug was pulled from the canal.  The tympanic membrane was intact, no sign of perforation or middle ear effusion.    I turned my attention to the contralateral side once again using the binocular surgical microscope to perform cerumen removal.  I began by using a cerumen loop to gently lift the edges of the cerumen mass away from the walls of the external canal.  Once I did this, I was able to suction away fragments of wax and debris using suction.  Once the mass was loose enough, the entire plug was pulled from the canal.  The tympanic membrane was intact, no sign of perforation or middle ear effusion.    Eyes - Extraocular movements intact, and the pupils were reactive to light.  Sclera were not icteric or injected, conjunctiva were pink and moist.  Mouth - Examination of the oral cavity showed pink, healthy oral mucosa. No lesions or ulcerations noted.  The tongue was mobile and midline, and edentulous  Patient is hard to obtain a good oral exam due to microgenia and FTP III  Throat - The walls of the oropharynx were smooth, pink, moist, symmetric, and had no lesions or ulcerations.  The tonsillar pillars and soft palate were symmetric.  The uvula was midline on elevation.    Neck - Normal midline excursion of the laryngotracheal complex during swallowing.  Full range of motion on passive movement.  Palpation of the occipital, submental, submandibular, internal jugular chain, and  supraclavicular nodes did not demonstrate any abnormal lymph nodes or masses.  Palpation of the thyroid was soft and smooth, with no nodules or goiter appreciated.  The trachea was mobile and midline.  Nose - External contour is symmetric, no gross deflection or scars.  Nasal mucosa is pink and moist with no abnormal mucus.      Flexible Endoscopy -     The patient was counseled that their symptoms and history require a direct visualization with an endoscopy procedure.  They understood and we proceeded with a fiberoptic examination.  First I sprayed both sides of the nose with a mixture of lidocaine and neosynephrine.  I then passed the scope through the nasal cavity.  The nasal cavity was unremarkable.  The nasopharynx was mucosally covered and symmetric.  The Eustachian tube openings were unobstructed.  Going further down I had a clear view of the base of tongue which had normal appearing lingual tonsillar tissue.  The base of tongue was free of lesions, and the vallecula was open.  The epiglottis was smooth and mucosally covered.  The supraglottic larynx was then clearly visualized.  The vocal cords moved smoothly and symmetrically, they were pearly white and no lesions were seen.  The pyriform sinuses were open, and the limited view of the postcricoid region did not show any lesions.      Impression and Plan- Marly Cannon is a 58 year old female with:        ICD-10-CM    1. Microgenia  M26.06 CT Soft Tissue Neck w/o & w Contrast      2. Abnormal PFT  R94.2 lidocaine 2%-oxymetazoline 0.025% nasal solution 2 spray     Adult ENT  Referral     CT Soft Tissue Neck w/o & w Contrast      3. Globus sensation  R09.A2 CT Soft Tissue Neck w/o & w Contrast      4. ADEEL (obstructive sleep apnea)  G47.33 CT Soft Tissue Neck w/o & w Contrast      5. Bilateral impacted cerumen  H61.23         No endolaryngeal mass seen upon examination today however, she does have microgenia and limited oral airway due to positioning  and anatomy.  Will complete imaging,     She was recommended to work on tobacco cessation.  She declined at this time.    She was highly recommended to start CPAP at this time.  Imaging to be completed.        April Forrester PA-C  ENT  [facility], [place]      Again, thank you for allowing me to participate in the care of your patient.        Sincerely,        April Forrester PA-C

## 2024-04-05 NOTE — PATIENT INSTRUCTIONS
Ears were cleaned.   Follow up as needed for ear cleaning    Laryngeal exam appears negative for mass.  Complete Neck CT  Small jaw/ mandible likely contributing.     Work on tobacco cessation   Thank you for allowing ORLANDO Olivo and our ENT team to participate in your care.  If your medications are too expensive, please give the nurse a call.  We can possibly change this medication.  If you have a scheduling or an appointment question please contact our Health Unit Coordinator at their direct line [phone].   ALL nursing questions or concerns can be directed to your ENT nurseJessika at: [phone].

## 2024-04-10 ENCOUNTER — HOSPITAL ENCOUNTER (OUTPATIENT)
Dept: CT IMAGING | Facility: HOSPITAL | Age: 59
Discharge: HOME | End: 2024-04-10
Attending: PHYSICIAN ASSISTANT | Admitting: PHYSICIAN ASSISTANT
Payer: COMMERCIAL

## 2024-04-10 DIAGNOSIS — R09.A2 GLOBUS SENSATION: ICD-10-CM

## 2024-04-10 DIAGNOSIS — M26.06 MICROGENIA: ICD-10-CM

## 2024-04-10 DIAGNOSIS — R94.2 ABNORMAL PFT: ICD-10-CM

## 2024-04-10 DIAGNOSIS — G47.33 OSA (OBSTRUCTIVE SLEEP APNEA): ICD-10-CM

## 2024-04-10 LAB
CREAT BLD-MCNC: 1.3 MG/DL (ref 0.5–1)
EGFRCR SERPLBLD CKD-EPI 2021: 47 ML/MIN/1.73M2

## 2024-04-10 PROCEDURE — 70491 CT SOFT TISSUE NECK W/DYE: CPT

## 2024-04-10 PROCEDURE — 82565 ASSAY OF CREATININE: CPT

## 2024-04-10 PROCEDURE — 250N000011 HC RX IP 250 OP 636: Performed by: RADIOLOGY

## 2024-04-10 RX ORDER — IOPAMIDOL 755 MG/ML
67 INJECTION, SOLUTION INTRAVASCULAR ONCE
Status: COMPLETED | OUTPATIENT
Start: 2024-04-10 | End: 2024-04-10

## 2024-04-10 RX ADMIN — IOPAMIDOL 67 ML: 755 INJECTION, SOLUTION INTRAVENOUS at 14:37

## 2024-04-15 ENCOUNTER — TELEPHONE (OUTPATIENT)
Dept: OTOLARYNGOLOGY | Facility: OTHER | Age: 59
End: 2024-04-15
Payer: COMMERCIAL

## 2024-04-15 DIAGNOSIS — Z12.4 CERVICAL CANCER SCREENING: Primary | ICD-10-CM

## 2024-04-15 NOTE — TELEPHONE ENCOUNTER
Spoke to patient via phone.  Per Dr. Jaimes: CT and photos reviewed, CT looks c/w small vallecular cyst, flex pics look non-concerning  Recommend follow-up scope in 3-4 months due to tobacco use  Small cyst would not cause dyspnea or change PFTs.  Patient verbalized understanding and changed appointment from may to August 1st.

## 2024-04-18 DIAGNOSIS — E11.65 TYPE 2 DIABETES MELLITUS WITH HYPERGLYCEMIA, WITH LONG-TERM CURRENT USE OF INSULIN (H): ICD-10-CM

## 2024-04-18 DIAGNOSIS — Z79.4 TYPE 2 DIABETES MELLITUS WITH HYPERGLYCEMIA, WITH LONG-TERM CURRENT USE OF INSULIN (H): ICD-10-CM

## 2024-04-18 RX ORDER — INSULIN PUMP CONTROLLER
EACH MISCELLANEOUS
Qty: 15 EACH | Refills: 4 | Status: SHIPPED | OUTPATIENT
Start: 2024-04-18 | End: 2024-07-18

## 2024-04-18 NOTE — TELEPHONE ENCOUNTER
Insulin Disposable Pump (OMNIPOD DASH PODS, GEN 4,) MISC   Last Written Prescription Date:  7/5/23  Last Fill Quantity: 15,   # refills: 5  Last Office Visit: 3/14/24  Future Office visit:    Next 5 appointments (look out 90 days)      May 16, 2024  1:00 PM  (Arrive by 12:45 PM)  Adult Preventative Visit with Amberly Whyte NP  [facility] [place] ([facility] [place] ) [address]  [place] [address]  [phone]             Routing refill request to provider for review/approval because:

## 2024-04-25 ENCOUNTER — ALLIED HEALTH/NURSE VISIT (OUTPATIENT)
Dept: EDUCATION SERVICES | Facility: OTHER | Age: 59
End: 2024-04-25
Attending: FAMILY MEDICINE
Payer: COMMERCIAL

## 2024-04-25 VITALS
WEIGHT: 194 LBS | OXYGEN SATURATION: 90 % | DIASTOLIC BLOOD PRESSURE: 70 MMHG | SYSTOLIC BLOOD PRESSURE: 120 MMHG | BODY MASS INDEX: 33.12 KG/M2 | HEART RATE: 83 BPM | HEIGHT: 64 IN

## 2024-04-25 DIAGNOSIS — E11.65 TYPE 2 DIABETES MELLITUS WITH HYPERGLYCEMIA, WITH LONG-TERM CURRENT USE OF INSULIN (H): Primary | ICD-10-CM

## 2024-04-25 DIAGNOSIS — F17.200 NICOTINE DEPENDENCE, UNCOMPLICATED, UNSPECIFIED NICOTINE PRODUCT TYPE: ICD-10-CM

## 2024-04-25 DIAGNOSIS — Z79.4 TYPE 2 DIABETES MELLITUS WITH HYPERGLYCEMIA, WITH LONG-TERM CURRENT USE OF INSULIN (H): Primary | ICD-10-CM

## 2024-04-25 PROCEDURE — G0463 HOSPITAL OUTPT CLINIC VISIT: HCPCS

## 2024-04-25 PROCEDURE — 99213 OFFICE O/P EST LOW 20 MIN: CPT | Performed by: NURSE PRACTITIONER

## 2024-04-25 ASSESSMENT — PAIN SCALES - GENERAL: PAINLEVEL: NO PAIN (0)

## 2024-04-25 NOTE — PROGRESS NOTES
SUBJECTIVE:  Marly Cannon, 58 year old, female presents with the following Chief Complaint(s) with HPI to follow:  Chief Complaint   Patient presents with    Diabetes Education        Diabetes Follow-up  Patient is checking blood sugars: >4x/day using her personal CGM (Freestyle Bud).  Results:    Unable to download    Symptoms of hypoglycemia (low blood sugar): no  Paresthesias (numbness or burning in feet) or sores: no, no sores  Diabetic eye exam within the last year: UTD  Breakfast eaten regularly: most of the time  Patient counting carbs: Yes  Exercising: depends on the weather          HPI:  Marly's here today for the follow up regarding her Diabetes mellitus, Type 2.    A1c trend:  Lab Results   Component Value Date    A1C 10.2 02/16/2024    A1C 11.0 11/15/2023    A1C 13.0 07/19/2023    A1C 15.1 04/19/2023    A1C 15.1 01/17/2023    A1C >14.0 05/10/2021    A1C 10.7 02/10/2021    A1C 7.6 11/09/2020    A1C 7.9 08/07/2020    A1C 8.1 05/04/2020     Current Diabetes medication:   1. Omnipod dash, using Novolog  2. Jardiance 25 mg daily  Statin use: yes, simvastatin 20 mg daily  ASA: yes, 81 mg daily    Marly reports the following:  No issues with the Freestyle Bud   No issues with insulin pump    No new change to her health     Weight trend   Wt Readings from Last 4 Encounters:   04/25/24 88 kg (194 lb)   04/05/24 86.6 kg (191 lb)   03/14/24 86.6 kg (191 lb)   02/16/24 86.2 kg (190 lb)       Patient Active Problem List   Diagnosis    Type 2 diabetes, HbA1c goal < 7% (H)    ACP (advance care planning)    Stage 3 chronic kidney disease (H)    Pulmonary emphysema (H)    Hyperparathyroidism due to renal insufficiency (H24)    Vitamin D deficiency    Intellectual disability    Breast fibrocystic disorder    Tobacco abuse    Microalbuminuria due to type 2 diabetes mellitus (H)    H/O colonoscopy    [place] cardiac risk <10% in next 10 years    Tubular adenoma of colon    Hyperlipidemia, unspecified  hyperlipidemia type    Essential hypertension    Callus of foot    Memory disturbance    ADEEL (obstructive sleep apnea)    Tremor    Diabetic polyneuropathy associated with diabetes mellitus due to underlying condition (H)    Urinary incontinence, unspecified type       No past medical history on file.    Past Surgical History:   Procedure Laterality Date    BIOPSY BREAST Left 02/14/2008    patient states no bx, Clip in left breast/ stereo bxc 2-- no path in [facility] that far back    COLONOSCOPY N/A 08/20/2015    Procedure: COLONOSCOPY;  Surgeon: Gentry Porter MD;  Location: [facility] OR    COLONOSCOPY N/A 09/21/2018    Procedure: COLONOSCOPY;  COLONOSCOPY WITH POLYPECTOMY;  Surgeon: Gentry Porter MD;  Location: [facility] OR       Family History   Problem Relation Age of Onset    Diabetes Mother     Diabetes Father     Hypertension Brother     Diabetes Sister        Social History     Tobacco Use    Smoking status: Every Day     Current packs/day: 1.00     Average packs/day: 1 pack/day for 45.3 years (45.3 ttl pk-yrs)     Types: Cigarettes     Start date: 1/1/1979     Passive exposure: Current    Smokeless tobacco: Never    Tobacco comments:     Declined QP 05/13/2020   Substance Use Topics    Alcohol use: No     Alcohol/week: 0.0 standard drinks of alcohol       Current Outpatient Medications   Medication Sig Dispense Refill    Acetaminophen (TYLENOL PO) Take 325 mg by mouth every 6 hours as needed      amLODIPine (NORVASC) 5 MG tablet Take 1 tablet (5 mg) by mouth daily 90 tablet 0    aspirin (ASPIRIN LOW DOSE) 81 MG chewable tablet CHEW AND SWALLOW 1 TABLET BY MOUTH DAILY 90 tablet 0    atorvastatin (LIPITOR) 40 MG tablet TAKE 1 TABLET BY MOUTH AT BEDTIME 90 tablet 3    blood glucose (ONETOUCH ULTRA) test strip USE TO TEST BLOOD SUGAR 4 TIMES DAILY 100 strip 4    Cholecalciferol (VITAMIN D3) 50 MCG (2000 UT) CAPS TAKE 1 CAPSULE BY MOUTH DAILY 90 capsule 3    Continuous Blood Gluc Sensor (FREESTYLE HANK 2  SENSOR) MISC CHANGE 1 SENSOR EVERY 14 DAYS, USE TO READ BLOOD SUGARS PER MANUFACTURERS INSTRUCTIONS 2 each 11    empagliflozin (JARDIANCE) 25 MG TABS tablet TAKE 1 TABLET BY MOUTH DAILY 30 tablet 5    insulin aspart (NOVOLOG VIAL) 100 UNITS/ML vial To be used with the insulin pump.  TDD: 70 units 30 mL 3    Insulin Disposable Pump (OMNIPOD DASH PODS, GEN 4,) MISC CHANGE 1 POD EVERY 48 HOURS 15 each 4    insulin glargine U-300 (TOUJEO SOLOSTAR) 300 UNIT/ML (1 units dial) pen Inject 20 Units Subcutaneous At Bedtime 9 mL 1    insulin pen needle (32G X 4 MM) 32G X 4 MM miscellaneous Use 1 pen needles daily 100 each 3    INSULIN PUMP - OUTPATIENT Insulin pump  Omnipod Clarence  Date: 2/7/24  Basal: 1.2  Total basal: 28.8  CR:15  ISF: 50  BG target: 120  BG correction: 130  Active insulin: 4 hours      ipratropium-albuterol (COMBIVENT RESPIMAT)  MCG/ACT inhaler INHALE 1 PUFF INTO THE LUNGS 4 TIMES DAILY 4 g 9    lisinopril (ZESTRIL) 40 MG tablet TAKE 1 TABLET BY MOUTH DAILY 90 tablet 3    nystatin (MYCOSTATIN) 629969 UNIT/GM external powder Apply topically 3 times daily (Patient taking differently: Apply topically 3 times daily as needed) 60 g 3    OneTouch Delica Lancets 33G MISC Inject 1 each Subcutaneous 3 times daily (before meals) 100 each 11    order for DME Women briefs 50 each 1    primidone (MYSOLINE) 50 MG tablet TAKE 1 TABLET BY MOUTH AT BEDTIME 30 tablet 11    tiotropium-olodaterol 2.5-2.5 MCG/ACT AERS Inhale 2 puffs into the lungs daily         No Known Allergies    REVIEW OF SYSTEMS  Skin: negative  Eyes: negative, wears glasses    Ears/Nose/Throat: negative  Respiratory: No shortness of breath or hemoptysis; smoker's cough; hx of sleep apnea  Cardiovascular: negative  Gastrointestinal: negative  Genitourinary: negative  Musculoskeletal: negative; hx of left shoulder pain and left knee--depends on the weather   Neurologic: negative  Psychiatric: negative  Hematologic/Lymphatic/Immunologic:  "negative  Endocrine: positive for diabetes     OBJECTIVE:  /70 (BP Location: Left arm, Patient Position: Sitting, Cuff Size: Adult Regular)   Pulse 83   Ht 1.63 m (5' 4.17\")   Wt 88 kg (194 lb)   SpO2 90%   BMI 33.12 kg/m    Constitutional: alert and no distress  Respiratory:  Good diaphragmatic excursion.   Musculoskeletal: extremities normal- no gross deformities noted and gait normal  Skin: no suspicious lesions or rashes to visible skin  Psychiatric: mentation appears normal and affect normal/bright      LABS  No results found for any visits on 04/25/24.    ASSESSMENT / PLAN:  (E11.65,  Z79.4) Type 2 diabetes mellitus with hyperglycemia, with long-term current use of insulin (H)  (primary encounter diagnosis)  Comment: noted insulin pump and CGM; noted A1c  Insulin pump  Omnipod Dash  Date: 4/25/24  Basal: 1.2  Total basal: 28.8  CR:15  ISF: 50  BG target: 120  BG correction: 130  Active insulin: 4 hours    Plan: CANCELED: GLUCOSE MONITOR, 72 HOUR, PHYS INTERP    A1c is trending in the right direction         No insulin pump changes at this time  Encourage her to keep taking her medication as prescribed    Keep working on giving insulin pump bolus BEFORE consuming carbs--some is better than none    (F17.200) Nicotine dependence, uncomplicated, unspecified nicotine product type  Comment: note, encouraged quitting  Plan:   Spoke with patient regarding options for smoking cessation.  Various pharmacologic options and behavioral techniques were reviewed.  The relative effectiveness as well as risks and benefits were reviewed with patient.  Patient is interested in: No interventions    Smoking Cessation    Let us know when you are ready    Follow up  As scheduled.      Call sooner if any issues    Patient Instructions   Continue working on healthy eating and moving (start low and slow, work up to 30 min, 5x/week)    BG goals:  Fasting and before meals <130, >70  2 hour after eating <180    We only need 1/2 " of these numbers to be within target then your A1c will be within target    Medication changes   None today    Keep working on entering carb bolus every time you consume carbs    Follow up   As discussed    Call me sooner if any problems/concerns and/or questions develop including consistent low BGs <70 or consistent high BGs >200  [phone] (Unit Coordinator)    [phone] (Anna, Nurse)    Smoking:   Keep working on quitting    Please keep your schedule appointment with Amberly BARNES NP    Time: 25 minutes  Barrier: see PMH  Willingness to learn: accepting    Courtney PINTO [facility]  Diabetes and Wound Care    Cc: Amberly Whyte NP    With the electronic record, we can now more quickly and easily track our patient diabetic goals. Our diabetes clinical review is in progress and these are the indicators we are monitoring for good diabetes health.     1.) HbA1C less than 7 (measurement of your average blood sugars)  2.) Blood Pressure less than 140/80  3.) LDL less than 100 (bad cholesterol)  4.) HbA1C is checked in the last 6 months and below 7% (more frequently if not at goal or adjusting medications)  5.) LDL is checked in the last 12 months (more frequently if not at goal or adjusting medications)  6.) Taking one baby aspirin daily (unless otherwise instructed)  7.) No tobacco use  8) Statin use     You have achieved 6 out of 8 of these and I am encouraging you to come in and get tuned up to achieve 8 out of 8!  Here is what you have achieved so far in my goals for you:  1.) HbA1C  less than 7:                              NO  Your last  HbA1C :  Lab Results   Component Value Date    A1C 10.2 02/16/2024    A1C 11.0 11/15/2023    A1C 13.0 07/19/2023    A1C 15.1 04/19/2023    A1C 15.1 01/17/2023    A1C >14.0 05/10/2021    A1C 10.7 02/10/2021    A1C 7.6 11/09/2020    A1C 7.9 08/07/2020    A1C 8.1 05/04/2020     2.) Blood Pressure less than 140/80:       YES     Your last                       BP Readings from Last 1  Encounters:   04/25/24 120/70     3.) LDL less than 100:                              YES      Your last     LDL Cholesterol Calculated   Date Value Ref Range Status   07/19/2023 66 <=100 mg/dL Final   11/09/2020 74 <100 mg/dL Final     Comment:     Desirable:       <100 mg/dl       4.) Checked HbA1C in the past 6 months: YES      5.) Checked LDL in the past 12 months:    YES      6.) Taking one aspirin daily:                       YES     7.) No tobacco use:                                        NO   8.) Statin use      YES

## 2024-05-07 NOTE — PATIENT INSTRUCTIONS
Continue working on healthy eating and moving (start low and slow, work up to 30 min, 5x/week)    BG goals:  Fasting and before meals <130, >70  2 hour after eating <180    We only need 1/2 of these numbers to be within target then your A1c will be within target    Medication changes   None today    Keep working on entering carb bolus every time you consume carbs    Follow up   As discussed    Call me sooner if any problems/concerns and/or questions develop including consistent low BGs <70 or consistent high BGs >200  [phone] (Unit Coordinator)    [phone] (Anna, Nurse)    Smoking:   Keep working on quitting    Please keep your schedule appointment with Amberly BARNES NP

## 2024-05-09 ENCOUNTER — TRANSFERRED RECORDS (OUTPATIENT)
Dept: MULTI SPECIALTY CLINIC | Facility: CLINIC | Age: 59
End: 2024-05-09
Payer: COMMERCIAL

## 2024-05-09 LAB — RETINOPATHY: NEGATIVE

## 2024-05-14 ENCOUNTER — MEDICAL CORRESPONDENCE (OUTPATIENT)
Dept: HEALTH INFORMATION MANAGEMENT | Facility: CLINIC | Age: 59
End: 2024-05-14
Payer: COMMERCIAL

## 2024-05-16 ENCOUNTER — OFFICE VISIT (OUTPATIENT)
Dept: FAMILY MEDICINE | Facility: OTHER | Age: 59
End: 2024-05-16
Attending: NURSE PRACTITIONER
Payer: COMMERCIAL

## 2024-05-16 VITALS
SYSTOLIC BLOOD PRESSURE: 118 MMHG | HEIGHT: 64 IN | HEART RATE: 83 BPM | OXYGEN SATURATION: 96 % | DIASTOLIC BLOOD PRESSURE: 74 MMHG | BODY MASS INDEX: 33.17 KG/M2 | TEMPERATURE: 98 F | WEIGHT: 194.3 LBS

## 2024-05-16 DIAGNOSIS — G47.33 OSA (OBSTRUCTIVE SLEEP APNEA): ICD-10-CM

## 2024-05-16 DIAGNOSIS — I10 ESSENTIAL HYPERTENSION: ICD-10-CM

## 2024-05-16 DIAGNOSIS — E11.65 TYPE 2 DIABETES MELLITUS WITH HYPERGLYCEMIA, WITH LONG-TERM CURRENT USE OF INSULIN (H): ICD-10-CM

## 2024-05-16 DIAGNOSIS — Z72.0 TOBACCO ABUSE: ICD-10-CM

## 2024-05-16 DIAGNOSIS — E78.5 HYPERLIPIDEMIA, UNSPECIFIED HYPERLIPIDEMIA TYPE: ICD-10-CM

## 2024-05-16 DIAGNOSIS — J44.9 OBSTRUCTIVE LUNG DISEASE (GENERALIZED) (H): ICD-10-CM

## 2024-05-16 DIAGNOSIS — N18.30 STAGE 3 CHRONIC KIDNEY DISEASE, UNSPECIFIED WHETHER STAGE 3A OR 3B CKD (H): ICD-10-CM

## 2024-05-16 DIAGNOSIS — Z79.4 TYPE 2 DIABETES MELLITUS WITH HYPERGLYCEMIA, WITH LONG-TERM CURRENT USE OF INSULIN (H): ICD-10-CM

## 2024-05-16 DIAGNOSIS — Z00.00 HEALTH MAINTENANCE EXAMINATION: Primary | ICD-10-CM

## 2024-05-16 DIAGNOSIS — Z12.4 SCREENING FOR CERVICAL CANCER: ICD-10-CM

## 2024-05-16 LAB
ALBUMIN SERPL BCG-MCNC: 4 G/DL (ref 3.5–5.2)
ALP SERPL-CCNC: 99 U/L (ref 40–150)
ALT SERPL W P-5'-P-CCNC: 6 U/L (ref 0–50)
ANION GAP SERPL CALCULATED.3IONS-SCNC: 12 MMOL/L (ref 7–15)
AST SERPL W P-5'-P-CCNC: 17 U/L (ref 0–45)
BILIRUB SERPL-MCNC: 0.3 MG/DL
BUN SERPL-MCNC: 19.6 MG/DL (ref 8–23)
CALCIUM SERPL-MCNC: 9.8 MG/DL (ref 8.8–10.2)
CHLORIDE SERPL-SCNC: 98 MMOL/L (ref 98–107)
CHOLEST SERPL-MCNC: 173 MG/DL
CREAT SERPL-MCNC: 1.16 MG/DL (ref 0.51–0.95)
DEPRECATED HCO3 PLAS-SCNC: 28 MMOL/L (ref 22–29)
EGFRCR SERPLBLD CKD-EPI 2021: 54 ML/MIN/1.73M2
EST. AVERAGE GLUCOSE BLD GHB EST-MCNC: 232 MG/DL
FASTING STATUS PATIENT QL REPORTED: YES
FASTING STATUS PATIENT QL REPORTED: YES
GLUCOSE SERPL-MCNC: 415 MG/DL (ref 70–99)
HBA1C MFR BLD: 9.7 %
HDLC SERPL-MCNC: 59 MG/DL
LDLC SERPL CALC-MCNC: 89 MG/DL
NONHDLC SERPL-MCNC: 114 MG/DL
POTASSIUM SERPL-SCNC: 4.3 MMOL/L (ref 3.4–5.3)
PROT SERPL-MCNC: 7.9 G/DL (ref 6.4–8.3)
SODIUM SERPL-SCNC: 138 MMOL/L (ref 135–145)
TRIGL SERPL-MCNC: 127 MG/DL

## 2024-05-16 PROCEDURE — 82040 ASSAY OF SERUM ALBUMIN: CPT | Mod: ZL | Performed by: NURSE PRACTITIONER

## 2024-05-16 PROCEDURE — 83036 HEMOGLOBIN GLYCOSYLATED A1C: CPT | Mod: ZL | Performed by: NURSE PRACTITIONER

## 2024-05-16 PROCEDURE — 99396 PREV VISIT EST AGE 40-64: CPT | Performed by: NURSE PRACTITIONER

## 2024-05-16 PROCEDURE — 80061 LIPID PANEL: CPT | Mod: ZL | Performed by: NURSE PRACTITIONER

## 2024-05-16 PROCEDURE — 36415 COLL VENOUS BLD VENIPUNCTURE: CPT | Mod: ZL | Performed by: NURSE PRACTITIONER

## 2024-05-16 PROCEDURE — G0463 HOSPITAL OUTPT CLINIC VISIT: HCPCS

## 2024-05-16 PROCEDURE — 87624 HPV HI-RISK TYP POOLED RSLT: CPT | Mod: ZL | Performed by: NURSE PRACTITIONER

## 2024-05-16 PROCEDURE — 99214 OFFICE O/P EST MOD 30 MIN: CPT | Mod: 25 | Performed by: NURSE PRACTITIONER

## 2024-05-16 RX ORDER — AMLODIPINE BESYLATE 5 MG/1
5 TABLET ORAL DAILY
Qty: 90 TABLET | Refills: 3 | Status: SHIPPED | OUTPATIENT
Start: 2024-05-16

## 2024-05-16 SDOH — HEALTH STABILITY: PHYSICAL HEALTH: ON AVERAGE, HOW MANY DAYS PER WEEK DO YOU ENGAGE IN MODERATE TO STRENUOUS EXERCISE (LIKE A BRISK WALK)?: 0 DAYS

## 2024-05-16 ASSESSMENT — SOCIAL DETERMINANTS OF HEALTH (SDOH): HOW OFTEN DO YOU GET TOGETHER WITH FRIENDS OR RELATIVES?: MORE THAN THREE TIMES A WEEK

## 2024-05-16 ASSESSMENT — PAIN SCALES - GENERAL: PAINLEVEL: NO PAIN (0)

## 2024-05-16 NOTE — PROGRESS NOTES
"Preventive Care Visit  [facility]  Amberly Whyte NP, Family Medicine  May 16, 2024      Assessment & Plan     (Z00.00) Health maintenance examination  (primary encounter diagnosis)  Plan: Declines any vaccines. Mammogram UTD. Pap and blood work updated today. Colonoscopy scheduled.     (E11.65,  Z79.4) Type 2 diabetes mellitus with hyperglycemia, with long-term current use of insulin (H)  Plan: Hemoglobin A1c        A1C in process. Will notify patient of the results when available and intervene accordingly. Most likely will be high. Noncomplaint with medications. On statin. BP at goal. Recently had her eye exam. Will request the records. Will see her back in 3 months, sooner with new or worsening symptoms.     (Z72.0) Tobacco abuse  Plan: Cessation encouraged.     (I10) Essential hypertension  Plan: Well controlled. Continue current medications. Encouraged daily exercise and a low sodium diet. Recommended checking BP's 2x/wk, call the clinic if consistantly s>140 or d>90. Follow up in 3 months.     (E78.5) Hyperlipidemia, unspecified hyperlipidemia type  Comment: tolerating statin  Plan: Lipid Profile (Chol, Trig, HDL, LDL calc)        Will update her lipids today.     (N18.30) Stage 3 chronic kidney disease, unspecified whether stage 3a or 3b CKD (H)  Plan: Basic metabolic panel        Will update her kidney function today. Will notify patient of the results when available and intervene accordingly.     (G47.33) ADEEL (obstructive sleep apnea)  Plan: Per sleep medicine, \"- I strongly recommended start of treatment with Bilevel PAP at 21/16 cm H2O in spontaneous mode with supplemental oxygen at 2 LPM\"    Patient politely declines.     (J44.9) Obstructive lung disease (generalized) (H)  Plan: Feeling well. Oxygen sats 96 percent. Recently saw ENT. No fixed airway obstruction was seen. Recent chest CT unremarkable. CT did show mild emphysema. Will continue follow-up with pulmonary-due again in 9/2024. " "    (Z12.4) Screening for cervical cancer  Plan: A pap thin layer screen with  HPV - Age 30 - 65 years, HPV High Risk Types DNA Cervical        Will notify patient of the results when available and intervene accordingly.       Patient has been advised of split billing requirements and indicates understanding: Yes    The longitudinal plan of care for the diagnosis(es)/condition(s) as documented were addressed during this visit. Due to the added complexity in care, I will continue to support Marly in the subsequent management and with ongoing continuity of care.      BMI  Estimated body mass index is 33.25 kg/m  as calculated from the following:    Height as of this encounter: 1.628 m (5' 4.1\").    Weight as of this encounter: 88.1 kg (194 lb 4.8 oz).   Weight management plan: Discussed healthy diet and exercise guidelines    Counseling  Appropriate preventive services were discussed with this patient, including applicable screening as appropriate for fall prevention, nutrition, physical activity, Tobacco-use cessation, weight loss and cognition.  Checklist reviewing preventive services available has been given to the patient.  Reviewed patient's diet, addressing concerns and/or questions.         Subjective   Marly is a 58 year old, presenting for the following:  Physical        Via the Health Maintenance questionnaire, the patient has reported the following services have been completed -Eye Exam: eye clinic [place] 2024-05-09, this information has been sent to the abstraction team.  Health Care Directive  Patient does not have a Health Care Directive or Living Will: Discussed advance care planning with patient; however, patient declined at this time.    HPI    Diabetes Follow-up    How often are you checking your blood sugar? Continuous glucose monitor  What time of day are you checking your blood sugars (select all that apply)?  Before meals  Have you had any blood sugars above 200?  No  Have you had any blood " sugars below 70?  No  What symptoms do you notice when your blood sugar is low?  None  What concerns do you have today about your diabetes? None   Do you have any of these symptoms? (Select all that apply)  No numbness or tingling in feet.  No redness, sores or blisters on feet.  No complaints of excessive thirst.  No reports of blurry vision.  No significant changes to weight.  A1C was 10.2 on 2/16/24.  Follows with the diabetic center.   Non-complaint with medications.   Denies chest pain, shortness of breath, dizziness, syncope, or palpitations.   Uses Toujeo with the insulin pump. Also on Jardiance.     Hyperlipidemia Follow-Up    Are you regularly taking any medication or supplement to lower your cholesterol?   Yes- lipitor 40mg  Are you having muscle aches or other side effects that you think could be caused by your cholesterol lowering medication?  No  Denies chest pain, shortness of breath, dizziness, syncope, or palpitations.     Hypertension Follow-up    Do you check your blood pressure regularly outside of the clinic? Yes   Are you following a low salt diet? Yes  Are your blood pressures ever more than 140 on the top number (systolic) OR more   than 90 on the bottom number (diastolic), for example 140/90? N/A  Taking amlodipine 5 mg with lisinopril 40 mg.     BP Readings from Last 2 Encounters:   05/16/24 118/74   04/25/24 120/70     Hemoglobin A1C (%)   Date Value   02/16/2024 10.2 (H)   11/15/2023 11.0 (H)   05/10/2021 >14.0 (H)   02/10/2021 10.7 (H)     LDL Cholesterol Calculated (mg/dL)   Date Value   07/19/2023 66   07/15/2022 66   11/09/2020 74   02/04/2020 49         Chronic Kidney Disease Follow-up    Do you take any over the counter pain medicine?: No            5/16/2024   General Health   How would you rate your overall physical health? Good   Feel stress (tense, anxious, or unable to sleep) Not at all         5/16/2024   Nutrition   Diet: Regular (no restrictions)         5/16/2024   Exercise    Days per week of moderate/strenous exercise 0 days   (!) EXERCISE CONCERN-walks when the weather is nive        5/16/2024   Social Factors   Frequency of gathering with friends or relatives More than three times a week   Worry food won't last until get money to buy more No   Food not last or not have enough money for food? No   Do you have housing?  Yes   Are you worried about losing your housing? No   Lack of transportation? No   Unable to get utilities (heat,electricity)? No         5/16/2024   Fall Risk   Fallen 2 or more times in the past year? No   Trouble with walking or balance? No          5/16/2024   Activities of Daily Living- Home Safety   Needs help with the following daily activites None of the above   Safety concerns in the home None of the above         5/16/2024   Dental   Dentist two times every year? Yes         5/16/2024   Hearing Screening   Hearing concerns? None of the above         5/16/2024   Driving Risk Screening   Patient/family members have concerns about driving No - does not drive; N/A         5/16/2024   General Alertness/Fatigue Screening   Have you been more tired than usual lately? No         5/16/2024   Urinary Incontinence Screening   Bothered by leaking urine in past 6 months No         5/16/2024   TB Screening   Were you born outside of the [place]? Yes           5/16/2024   Substance Use   If I could quit smoking, I would Completely disagree   I want to quit somking, worry about health affects Completely disagree   Willing to make a plan to quit smoking Completely disagree   Willing to cut down before quitting Completely disagree   Alcohol more than 3/day or more than 7/wk No   Do you have a current opioid prescription? No   How severe/bad is pain from 1 to 10? 0/10 (No Pain)   Do you use any other substances recreationally? No     Currently smoking 1 ppd. No interest in quitting.     Social History     Tobacco Use    Smoking status: Every Day     Current packs/day: 1.00      Average packs/day: 1 pack/day for 45.4 years (45.4 ttl pk-yrs)     Types: Cigarettes     Start date: 1/1/1979     Passive exposure: Current    Smokeless tobacco: Never    Tobacco comments:     Declined QP 05/13/2020   Vaping Use    Vaping status: Never Used   Substance Use Topics    Alcohol use: No     Alcohol/week: 0.0 standard drinks of alcohol    Drug use: No           9/19/2023   LAST FHS-7 RESULTS   1st degree relative breast or ovarian cancer No   Any relative bilateral breast cancer No   Any male have breast cancer No   Any ONE woman have BOTH breast AND ovarian cancer No   Any woman with breast cancer before 50yrs No   2 or more relatives with breast AND/OR ovarian cancer No   2 or more relatives with breast AND/OR bowel cancer No     Mammogram Screening - Mammogram every 1-2 years updated in Health Maintenance based on mutual decision making      History of abnormal Pap smear: No - age 30- 64 PAP with HPV every 5 years recommended        Latest Ref Rng & Units 7/31/2019    10:16 AM 3/14/2014    12:00 AM   PAP / HPV   PAP (Historical)  NIL     HPV 16 DNA NEG^Negative Negative     HPV 18 DNA NEG^Negative Negative     Other HR HPV NEG^Negative Negative     PAP-ABSTRACT   See Scanned Document           This result is from an external source.     No abnormal vaginal discharge. No vaginal bleeding.     ASCVD Risk   The 10-year ASCVD risk score (Demetrice MATUTE, et al., 2019) is: 13.7%    Values used to calculate the score:      Age: 58 years      Sex: Female      Is Non- : No      Diabetic: Yes      Tobacco smoker: Yes      Systolic Blood Pressure: 118 mmHg      Is BP treated: Yes      HDL Cholesterol: 52 mg/dL      Total Cholesterol: 174 mg/dL        Reviewed and updated as needed this visit by Provider                    BP Readings from Last 3 Encounters:   05/16/24 118/74   04/25/24 120/70   04/05/24 138/68    Wt Readings from Last 3 Encounters:   05/16/24 88.1 kg (194 lb 4.8 oz)    04/25/24 88 kg (194 lb)   04/05/24 86.6 kg (191 lb)                  Patient Active Problem List   Diagnosis    Type 2 diabetes, HbA1c goal < 7% (H)    ACP (advance care planning)    Stage 3 chronic kidney disease (H)    Pulmonary emphysema (H)    Hyperparathyroidism due to renal insufficiency (H24)    Vitamin D deficiency    Intellectual disability    Breast fibrocystic disorder    Tobacco abuse    Microalbuminuria due to type 2 diabetes mellitus (H)    H/O colonoscopy    [place] cardiac risk <10% in next 10 years    Tubular adenoma of colon    Hyperlipidemia, unspecified hyperlipidemia type    Essential hypertension    Callus of foot    Memory disturbance    ADEEL (obstructive sleep apnea)    Tremor    Diabetic polyneuropathy associated with diabetes mellitus due to underlying condition (H)    Urinary incontinence, unspecified type     Past Surgical History:   Procedure Laterality Date    BIOPSY BREAST Left 02/14/2008    patient states no bx, Clip in left breast/ stereo bxc 2-- no path in EPIC that far back    COLONOSCOPY N/A 08/20/2015    Procedure: COLONOSCOPY;  Surgeon: Gentry Porter MD;  Location: [facility]    COLONOSCOPY N/A 09/21/2018    Procedure: COLONOSCOPY;  COLONOSCOPY WITH POLYPECTOMY;  Surgeon: Gentry Porter MD;  Location: [facility]       Social History     Tobacco Use    Smoking status: Every Day     Current packs/day: 1.00     Average packs/day: 1 pack/day for 45.4 years (45.4 ttl pk-yrs)     Types: Cigarettes     Start date: 1/1/1979     Passive exposure: Current    Smokeless tobacco: Never    Tobacco comments:     Declined QP 05/13/2020   Substance Use Topics    Alcohol use: No     Alcohol/week: 0.0 standard drinks of alcohol     Family History   Problem Relation Age of Onset    Diabetes Mother     Diabetes Father     Hypertension Brother     Diabetes Sister          Current Outpatient Medications   Medication Sig Dispense Refill    Acetaminophen (TYLENOL PO) Take 325 mg by mouth  every 6 hours as needed      amLODIPine (NORVASC) 5 MG tablet Take 1 tablet (5 mg) by mouth daily 90 tablet 0    aspirin (ASPIRIN LOW DOSE) 81 MG chewable tablet CHEW AND SWALLOW 1 TABLET BY MOUTH DAILY 90 tablet 0    atorvastatin (LIPITOR) 40 MG tablet TAKE 1 TABLET BY MOUTH AT BEDTIME 90 tablet 3    blood glucose (ONETOUCH ULTRA) test strip USE TO TEST BLOOD SUGAR 4 TIMES DAILY 100 strip 4    Cholecalciferol (VITAMIN D3) 50 MCG (2000 UT) CAPS TAKE 1 CAPSULE BY MOUTH DAILY 90 capsule 3    Continuous Blood Gluc Sensor (FREESTYLE HANK 2 SENSOR) MISC CHANGE 1 SENSOR EVERY 14 DAYS, USE TO READ BLOOD SUGARS PER MANUFACTURERS INSTRUCTIONS 2 each 11    empagliflozin (JARDIANCE) 25 MG TABS tablet TAKE 1 TABLET BY MOUTH DAILY 30 tablet 5    insulin aspart (NOVOLOG VIAL) 100 UNITS/ML vial To be used with the insulin pump.  TDD: 70 units 30 mL 3    Insulin Disposable Pump (OMNIPOD DASH PODS, GEN 4,) MISC CHANGE 1 POD EVERY 48 HOURS 15 each 4    insulin pen needle (32G X 4 MM) 32G X 4 MM miscellaneous Use 1 pen needles daily 100 each 3    INSULIN PUMP - OUTPATIENT Date: 4/25/24  Basal: 1.2  Total basal: 28.8  CR:15  ISF: 50  BG target: 120  BG correction: 130  Active insulin: 4 hours      ipratropium-albuterol (COMBIVENT RESPIMAT)  MCG/ACT inhaler INHALE 1 PUFF INTO THE LUNGS 4 TIMES DAILY 4 g 9    lisinopril (ZESTRIL) 40 MG tablet TAKE 1 TABLET BY MOUTH DAILY 90 tablet 3    OneTouch Delica Lancets 33G MISC Inject 1 each Subcutaneous 3 times daily (before meals) 100 each 11    order for DME Women briefs 50 each 1    primidone (MYSOLINE) 50 MG tablet TAKE 1 TABLET BY MOUTH AT BEDTIME 30 tablet 11    tiotropium-olodaterol 2.5-2.5 MCG/ACT AERS Inhale 2 puffs into the lungs daily      insulin glargine U-300 (TOUJEO SOLOSTAR) 300 UNIT/ML (1 units dial) pen Inject 20 Units Subcutaneous At Bedtime (Patient not taking: Reported on 5/16/2024) 9 mL 1     Current providers sharing in care for this patient include:  Patient Care  Team:  Amberly Whyte NP as PCP - General (Family Practice)  Gege Walter LPN as LPN  Courtney Chaudhary APRN CNP as Diabetes Educator (Family Practice)  Carley Pineda, RN as Diabetes Educator (Diabetes Education)  John Chaudhary RPH as Pharmacist (Pharmacist)  John Chaudhary RPH as Assigned MTM Pharmacist  Anupam Duval MD as Assigned Sleep Provider  Amberly Whyte NP as Assigned PCP  April Forrester PA-C as Assigned Surgical Provider    The following health maintenance items are reviewed in Epic and correct as of today:  Health Maintenance   Topic Date Due    ZOSTER IMMUNIZATION (1 of 2) Never done    Pneumococcal Vaccine: Pediatrics (0 to 5 Years) and At-Risk Patients (6 to 64 Years) (2 of 2 - PCV) 02/12/2019    MEDICARE ANNUAL WELLNESS VISIT  07/31/2020    DTAP/TDAP/TD IMMUNIZATION (2 - Td or Tdap) 02/21/2021    ADVANCE CARE PLANNING  09/09/2021    RSV VACCINE (Pregnancy & 60+) (1 - 1-dose 60+ series) Never done    COVID-19 Vaccine (1 - 2023-24 season) Never done    COLORECTAL CANCER SCREENING  09/21/2023    EYE EXAM  05/08/2024    A1C  05/16/2024    HPV TEST  07/31/2024    PAP  07/31/2024    LIPID  07/19/2024    LUNG CANCER SCREENING  07/21/2024    INFLUENZA VACCINE (Season Ended) 09/01/2024    MICROALBUMIN  11/15/2024    HEMOGLOBIN  11/15/2024    BMP  02/16/2025    DIABETIC FOOT EXAM  02/16/2025    NICOTINE/TOBACCO CESSATION COUNSELING Q 1 YR  03/08/2025    MAMMO SCREENING  09/19/2025    SPIROMETRY  Completed    HEPATITIS C SCREENING  Completed    HIV SCREENING  Completed    COPD ACTION PLAN  Completed    PHQ-2 (once per calendar year)  Completed    URINALYSIS  Completed    IPV IMMUNIZATION  Aged Out    HPV IMMUNIZATION  Aged Out    MENINGITIS IMMUNIZATION  Aged Out    RSV MONOCLONAL ANTIBODY  Aged Out         Review of Systems  CONSTITUTIONAL: NEGATIVE for fever, chills, change in weight  INTEGUMENTARY/SKIN: NEGATIVE for worrisome rashes, moles or lesions  EYES: NEGATIVE for vision changes or  "irritation  ENT/MOUTH: NEGATIVE for ear, mouth and throat problems  RESP: NEGATIVE for significant cough or SOB  BREAST: NEGATIVE for masses, tenderness or discharge  CV: NEGATIVE for chest pain, palpitations or peripheral edema  GI: NEGATIVE for nausea, abdominal pain, heartburn, or change in bowel habits  : NEGATIVE for frequency, dysuria, or hematuria  MUSCULOSKELETAL: NEGATIVE for significant arthralgias or myalgia  NEURO: NEGATIVE for weakness, dizziness or paresthesias  ENDOCRINE: NEGATIVE for temperature intolerance, skin/hair changes  HEME: NEGATIVE for bleeding problems  PSYCHIATRIC: NEGATIVE for changes in mood or affect     Objective    Exam  /74 (BP Location: Right arm, Patient Position: Sitting, Cuff Size: Adult Regular)   Pulse 83   Temp 98  F (36.7  C) (Tympanic)   Ht 1.628 m (5' 4.1\")   Wt 88.1 kg (194 lb 4.8 oz)   SpO2 96%   BMI 33.25 kg/m     Estimated body mass index is 33.25 kg/m  as calculated from the following:    Height as of this encounter: 1.628 m (5' 4.1\").    Weight as of this encounter: 88.1 kg (194 lb 4.8 oz).    Physical Exam  GENERAL: alert and no distress  EYES: Eyes grossly normal to inspection, PERRL and conjunctivae and sclerae normal  HENT: ear canals and TM's normal, nose and mouth without ulcers or lesions  NECK: no adenopathy, no asymmetry, masses, or scars  RESP: lungs clear to auscultation - no rales, rhonchi or wheezes  BREAST: normal without masses, tenderness or nipple discharge and no palpable axillary masses or adenopathy  CV: regular rate and rhythm, normal S1 S2, no S3 or S4, no murmur, click or rub, no peripheral edema  ABDOMEN: soft, nontender, no hepatosplenomegaly, no masses and bowel sounds normal   (female) w/bimanual: normal female external genitalia, normal urethral meatus, normal vaginal mucosa, and normal cervix/adnexa/uterus without masses or discharge  MS: no gross musculoskeletal defects noted, no edema  SKIN: no suspicious lesions or " sesar  NEURO: Normal strength and tone, mentation intact and speech normal  PSYCH: mentation appears normal, affect normal/bright        5/16/2024   Mini Cog   Mini-Cog Not Completed (choose reason) Patient declines     Patient declined to answer.           Signed Electronically by: Amberly Whyte NP

## 2024-05-17 ENCOUNTER — PATIENT OUTREACH (OUTPATIENT)
Dept: GASTROENTEROLOGY | Facility: CLINIC | Age: 59
End: 2024-05-17
Payer: COMMERCIAL

## 2024-05-19 DIAGNOSIS — Z53.9 PERSONS ENCOUNTERING HEALTH SERVICES FOR SPECIFIC PROCEDURES, NOT CARRIED OUT: Primary | ICD-10-CM

## 2024-05-19 PROCEDURE — G0179 MD RECERTIFICATION HHA PT: HCPCS | Performed by: NURSE PRACTITIONER

## 2024-05-20 LAB
HPV HR 12 DNA CVX QL NAA+PROBE: NEGATIVE
HPV16 DNA CVX QL NAA+PROBE: NEGATIVE
HPV18 DNA CVX QL NAA+PROBE: NEGATIVE
HUMAN PAPILLOMA VIRUS FINAL DIAGNOSIS: NORMAL

## 2024-05-28 DIAGNOSIS — E11.9 TYPE 2 DIABETES, HBA1C GOAL < 7% (H): ICD-10-CM

## 2024-05-28 NOTE — TELEPHONE ENCOUNTER
Failed protocol    GFR Estimate   Date Value Ref Range Status   05/16/2024 54 (L) >60 mL/min/1.73m2 Final   06/23/2021 57 (L) >60 mL/min/[1.73_m2] Final     Comment:     Non  GFR Calc  Starting 12/18/2018, serum creatinine based estimated GFR (eGFR) will be   calculated using the Chronic Kidney Disease Epidemiology Collaboration   (CKD-EPI) equation.       GFR, ESTIMATED POCT   Date Value Ref Range Status   04/10/2024 47 (L) >60 mL/min/1.73m2 Final        empagliflozin (JARDIANCE) 25 MG TABS tablet       Last Written Prescription Date:  11/30/23  Last Fill Quantity: 30,   # refills: 5  Last Office Visit: 5/16/24  Future Office visit:    Next 5 appointments (look out 90 days)      Aug 01, 2024  1:30 PM  (Arrive by 1:15 PM)  Return Visit with April Forrester PA-C  [facility] [place] ([facility] [place] ) [address]  [place] [address]  [phone]     Aug 20, 2024  3:00 PM  (Arrive by 2:45 PM)  Provider Visit with Amberly Whyte NP  [facility] ([facility] [place] ) [address]  [place] [address]  [phone]             Routing refill request to provider for review/approval because:

## 2024-05-30 LAB
BKR LAB AP GYN ADEQUACY: NORMAL
BKR LAB AP GYN INTERPRETATION: NORMAL
BKR LAB AP HPV REFLEX: NO
BKR LAB AP PREVIOUS ABNORMAL: NORMAL
PATH REPORT.COMMENTS IMP SPEC: NORMAL
PATH REPORT.COMMENTS IMP SPEC: NORMAL
PATH REPORT.RELEVANT HX SPEC: NORMAL

## 2024-05-30 PROCEDURE — G0123 SCREEN CERV/VAG THIN LAYER: HCPCS

## 2024-06-12 NOTE — OR NURSING
Called patient and reminded her she needs a pre-op for anesthesia on 06/26.  Patient verbalized understanding.

## 2024-06-18 DIAGNOSIS — G25.0 ESSENTIAL TREMOR: ICD-10-CM

## 2024-06-18 RX ORDER — PRIMIDONE 50 MG/1
50 TABLET ORAL AT BEDTIME
Qty: 30 TABLET | Refills: 2 | Status: SHIPPED | OUTPATIENT
Start: 2024-06-18 | End: 2024-09-11

## 2024-06-18 NOTE — TELEPHONE ENCOUNTER
Primidone 50 MG      Last Written Prescription Date:  06/21/23  Last Fill Quantity: 30,   # refills: 11  Last Office Visit: 05/16/24  Future Office visit:    Next 5 appointments (look out 90 days)      Aug 01, 2024  1:30 PM  (Arrive by 1:15 PM)  Return Visit with April Forrester PA-C  [facility] ([facility] [place] ) [address] [place] [address]  [phone]     Aug 20, 2024  3:00 PM  (Arrive by 2:45 PM)  Provider Visit with Amberly Whyte NP  [facility] [place] ([facility] [place] ) [address]  [place] [address]  [phone]             Routing refill request to provider for review/approval because:  Drug not on the [facility], P or [facility] refill protocol or controlled substance

## 2024-06-27 DIAGNOSIS — Z79.4 TYPE 2 DIABETES MELLITUS WITH HYPERGLYCEMIA, WITH LONG-TERM CURRENT USE OF INSULIN (H): ICD-10-CM

## 2024-06-27 DIAGNOSIS — E11.65 TYPE 2 DIABETES MELLITUS WITH HYPERGLYCEMIA, WITH LONG-TERM CURRENT USE OF INSULIN (H): ICD-10-CM

## 2024-06-27 RX ORDER — INSULIN ASPART 100 [IU]/ML
INJECTION, SOLUTION INTRAVENOUS; SUBCUTANEOUS
Qty: 30 ML | Refills: 3 | Status: SHIPPED | OUTPATIENT
Start: 2024-06-27

## 2024-06-27 NOTE — TELEPHONE ENCOUNTER
INSULIN ASPART 100 UNIT/ML VL       Last Written Prescription Date:  10/5/23  Last Fill Quantity: 30 mL,   # refills: 3  Last Office Visit: 5/16/24  Future Office visit:    Next 5 appointments (look out 90 days)      Aug 01, 2024 1:30 PM  (Arrive by 1:15 PM)  Return Visit with April Forrester PA-C  [facility] [place] ([facility] [place] ) [address]  [place] [address]  [phone]     Aug 20, 2024 3:00 PM  (Arrive by 2:45 PM)  Provider Visit with Amberly Whyte NP  [facility] [place] ([facility] [place] ) [address]  [place] [address]  [phone]             Routing refill request to provider for review/approval because:

## 2024-07-10 ENCOUNTER — PATIENT OUTREACH (OUTPATIENT)
Dept: GASTROENTEROLOGY | Facility: CLINIC | Age: 59
End: 2024-07-10
Payer: COMMERCIAL

## 2024-07-10 DIAGNOSIS — Z12.11 SCREEN FOR COLON CANCER: Primary | ICD-10-CM

## 2024-07-10 NOTE — LETTER
July 10, 2024      Marly Cannon  [address]  [place] [address]            Sommer Luke,    We hope this message  finds you doing well.     Your health is important to us at [facility]. Our records indicate that you could be due, or possibly overdue, for a screening or surveillance colonoscopy if you have not had a colonoscopy since 2018.     An order has been placed for you to have this completed. Our scheduling team will be reaching out to you to assist in getting this scheduled. If you do not hear from them within 7 days or you would like to schedule sooner, please call [phone] - [facility]     If you believe this is in error and have already had a colonoscopy done, please have your results and recommendations faxed to [phone].    If you have questions about this order or have further concerns, please reach out to your primary care provider.     Emmett     Colorectal Cancer RN Screening Team  [facility]

## 2024-07-10 NOTE — TELEPHONE ENCOUNTER
"CRC Screening Colonoscopy Referral Review    Patient meets the inclusion criteria for screening colonoscopy standing order.    Ordering/Referring Provider:  Amberly Whyte     BMI: Estimated body mass index is 33.25 kg/m  as calculated from the following:    Height as of 5/16/24: 1.628 m (5' 4.1\").    Weight as of 5/16/24: 88.1 kg (194 lb 4.8 oz).     Sedation:  Does patient have any of the following conditions affecting sedation?  No medical conditions affecting sedation.    Previous Scopes:  Any previous recommendations or follow up needs based on previous scope?  na / No recommendations.    Medical Concerns to Postpone Order:  Does patient have any of the following medical concerns that should postpone/delay colonoscopy referral?  No medical conditions affecting colonoscopy referral.    Final Referral Details:  Based on patient's medical history patient is appropriate for referral order with moderate sedation. If patient's BMI > 50 do not schedule in ASC.    Margarita Avila RN on 7/10/2024 at 9:01 AM    "

## 2024-07-11 ENCOUNTER — TELEPHONE (OUTPATIENT)
Dept: FAMILY MEDICINE | Facility: OTHER | Age: 59
End: 2024-07-11

## 2024-07-11 DIAGNOSIS — Z12.11 ENCOUNTER FOR SCREENING COLONOSCOPY: Primary | ICD-10-CM

## 2024-07-11 NOTE — TELEPHONE ENCOUNTER
Screening Questions for the Scheduling of Screening Colonoscopies     (If Colonoscopy is diagnostic, Provider should review the chart before scheduling.)    Are you younger than 50 or older than 80?  No    Do you take aspirin or fish oil?  Yes:  (if yes, tell patient to stop 1 week prior to Colonoscopy)    Do you take warfarin (Coumadin), clopidogrel (Plavix), apixaban (Eliquis), dabigatram (Pradaxa), rivaroxaban (Xarelto) or any blood thinner? No    Do you use oxygen at home?  No    Do you have kidney disease? No    Are you on dialysis? No    Have you had a stroke or heart attack in the last year? No    Have you had a stent in your heart or any blood vessel in the last year? No    Have you had a transplant of any organ?  No    Have you had a colonoscopy or upper endoscopy (EGD) before?  YES. Date-.         Date of scheduled Colonoscopy: 12/12/24    Provider: Dr. MOIRA Cannon     [facility]

## 2024-07-18 ENCOUNTER — ALLIED HEALTH/NURSE VISIT (OUTPATIENT)
Dept: EDUCATION SERVICES | Facility: OTHER | Age: 59
End: 2024-07-18
Attending: NURSE PRACTITIONER
Payer: COMMERCIAL

## 2024-07-18 VITALS
WEIGHT: 199.3 LBS | HEIGHT: 64 IN | HEART RATE: 88 BPM | DIASTOLIC BLOOD PRESSURE: 85 MMHG | BODY MASS INDEX: 34.02 KG/M2 | OXYGEN SATURATION: 92 % | SYSTOLIC BLOOD PRESSURE: 138 MMHG | RESPIRATION RATE: 16 BRPM

## 2024-07-18 DIAGNOSIS — F17.200 NICOTINE DEPENDENCE, UNCOMPLICATED, UNSPECIFIED NICOTINE PRODUCT TYPE: Primary | ICD-10-CM

## 2024-07-18 DIAGNOSIS — Z79.4 TYPE 2 DIABETES MELLITUS WITH HYPERGLYCEMIA, WITH LONG-TERM CURRENT USE OF INSULIN (H): ICD-10-CM

## 2024-07-18 DIAGNOSIS — L21.9 SEBORRHEIC DERMATITIS: ICD-10-CM

## 2024-07-18 DIAGNOSIS — Z53.9 PERSONS ENCOUNTERING HEALTH SERVICES FOR SPECIFIC PROCEDURES, NOT CARRIED OUT: Primary | ICD-10-CM

## 2024-07-18 DIAGNOSIS — E11.65 TYPE 2 DIABETES MELLITUS WITH HYPERGLYCEMIA, WITH LONG-TERM CURRENT USE OF INSULIN (H): ICD-10-CM

## 2024-07-18 PROCEDURE — G0179 MD RECERTIFICATION HHA PT: HCPCS | Performed by: NURSE PRACTITIONER

## 2024-07-18 PROCEDURE — 99213 OFFICE O/P EST LOW 20 MIN: CPT | Performed by: NURSE PRACTITIONER

## 2024-07-18 PROCEDURE — G0463 HOSPITAL OUTPT CLINIC VISIT: HCPCS

## 2024-07-18 PROCEDURE — 95251 CONT GLUC MNTR ANALYSIS I&R: CPT | Performed by: NURSE PRACTITIONER

## 2024-07-18 RX ORDER — KETOCONAZOLE 20 MG/G
CREAM TOPICAL DAILY
Qty: 60 G | Refills: 1 | Status: SHIPPED | OUTPATIENT
Start: 2024-07-18

## 2024-07-18 RX ORDER — INSULIN PUMP CONTROLLER
1 EACH MISCELLANEOUS
Qty: 15 EACH | Refills: 4 | Status: SHIPPED | OUTPATIENT
Start: 2024-07-18

## 2024-07-18 ASSESSMENT — PAIN SCALES - GENERAL: PAINLEVEL: NO PAIN (0)

## 2024-07-18 NOTE — PROGRESS NOTES
SUBJECTIVE:  Marly Cannon, 58 year old, female presents with the following Chief Complaint(s) with HPI to follow:  Chief Complaint   Patient presents with    Diabetes        Diabetes Follow-up  Patient is checking blood sugars: >4x/day using her personal CGM (Freestyle Bud).  Results:    Date: 7/5 to 7/18/24  Glucose management indicator: 7.4%    Average glucose: 173    Glucose range  Very low (<54): 0  low (<70): 6%  In target range (): 55%  High (>180): 21%  Very high (>250): 18%    Symptoms of hypoglycemia (low blood sugar): some--didn't feel different  Paresthesias (numbness or burning in feet) or sores: no, no sores  Diabetic eye exam within the last year: UTD  Breakfast eaten regularly: most of the time  Patient counting carbs: Yes  Exercising: depends on the weather          HPI:  Marly's here today for the follow up regarding her Diabetes mellitus, Type 2.    A1c trend:  Lab Results   Component Value Date    A1C 9.7 05/16/2024    A1C 10.2 02/16/2024    A1C 11.0 11/15/2023    A1C 13.0 07/19/2023    A1C 15.1 04/19/2023    A1C >14.0 05/10/2021    A1C 10.7 02/10/2021    A1C 7.6 11/09/2020    A1C 7.9 08/07/2020    A1C 8.1 05/04/2020     Current Diabetes medication:   1. Omnipod dash, using Novolog  2. Jardiance 25 mg daily  Statin use: yes, simvastatin 20 mg daily  ASA: yes, 81 mg daily    Marly reports the following:  No issues with the Freestyle Bud   No issues with insulin pump    Having some issues with low BGs  Not sure why    Marly has had issues with skin  History of seborrheic dermatitis  It worse today  Has been using lotion    Weight trend   Wt Readings from Last 4 Encounters:   07/18/24 90.4 kg (199 lb 4.8 oz)   05/16/24 88.1 kg (194 lb 4.8 oz)   04/25/24 88 kg (194 lb)   04/05/24 86.6 kg (191 lb)       Patient Active Problem List   Diagnosis    Type 2 diabetes, HbA1c goal < 7% (H)    ACP (advance care planning)    Stage 3 chronic kidney disease (H)    Pulmonary emphysema (H)     Hyperparathyroidism due to renal insufficiency (H24)    Vitamin D deficiency    Intellectual disability    Breast fibrocystic disorder    Tobacco abuse    Microalbuminuria due to type 2 diabetes mellitus (H)    H/O colonoscopy    [place] cardiac risk <10% in next 10 years    Tubular adenoma of colon    Hyperlipidemia, unspecified hyperlipidemia type    Essential hypertension    Callus of foot    Memory disturbance    ADEEL (obstructive sleep apnea)    Tremor    Diabetic polyneuropathy associated with diabetes mellitus due to underlying condition (H)    Urinary incontinence, unspecified type       History reviewed. No pertinent past medical history.    Past Surgical History:   Procedure Laterality Date    BIOPSY BREAST Left 02/14/2008    patient states no bx, Clip in left breast/ stereo bxc 2-- no path in [facility] that far back    COLONOSCOPY N/A 08/20/2015    Procedure: COLONOSCOPY;  Surgeon: Gentry Porter MD;  Location: [facility] OR    COLONOSCOPY N/A 09/21/2018    Procedure: COLONOSCOPY;  COLONOSCOPY WITH POLYPECTOMY;  Surgeon: Gentry Porter MD;  Location: [facility] OR       Family History   Problem Relation Age of Onset    Diabetes Mother     Diabetes Father     Hypertension Brother     Diabetes Sister        Social History     Tobacco Use    Smoking status: Every Day     Current packs/day: 1.00     Average packs/day: 1 pack/day for 45.6 years (45.6 ttl pk-yrs)     Types: Cigarettes     Start date: 1/1/1979     Passive exposure: Current    Smokeless tobacco: Never    Tobacco comments:     Declined QP 05/13/2020   Substance Use Topics    Alcohol use: No     Alcohol/week: 0.0 standard drinks of alcohol       Current Outpatient Medications   Medication Sig Dispense Refill    Acetaminophen (TYLENOL PO) Take 325 mg by mouth every 6 hours as needed      amLODIPine (NORVASC) 5 MG tablet Take 1 tablet (5 mg) by mouth daily 90 tablet 3    aspirin (ASPIRIN LOW DOSE) 81 MG chewable tablet CHEW AND SWALLOW 1 TABLET BY  "MOUTH DAILY 90 tablet 0    blood glucose (ONETOUCH ULTRA) test strip USE TO TEST BLOOD SUGAR 4 TIMES DAILY 100 strip 4    Cholecalciferol (VITAMIN D3) 50 MCG (2000 UT) CAPS TAKE 1 CAPSULE BY MOUTH DAILY 90 capsule 3    Continuous Blood Gluc Sensor (FREESTYLE HANK 2 SENSOR) MISC CHANGE 1 SENSOR EVERY 14 DAYS, USE TO READ BLOOD SUGARS PER MANUFACTURERS INSTRUCTIONS 2 each 11    empagliflozin (JARDIANCE) 25 MG TABS tablet Take 1 tablet (25 mg) by mouth daily 90 tablet 3    insulin aspart (NOVOLOG VIAL) 100 UNITS/ML vial TO BE USED WITH INSULIN PUMP, MAX DAILY UNITS 70 30 mL 3    Insulin Disposable Pump (OMNIPOD DASH PODS, GEN 4,) MISC Inject 1 pod subcutaneously every 48 hours 15 each 4    insulin glargine U-300 (TOUJEO SOLOSTAR) 300 UNIT/ML (1 units dial) pen Inject 20 Units Subcutaneous At Bedtime 9 mL 1    insulin pen needle (32G X 4 MM) 32G X 4 MM miscellaneous Use 1 pen needles daily 100 each 3    INSULIN PUMP - OUTPATIENT Date: 4/25/24  Basal: 1.2  Total basal: 28.8  CR:15  ISF: 50  BG target: 120  BG correction: 130  Active insulin: 4 hours      ipratropium-albuterol (COMBIVENT RESPIMAT)  MCG/ACT inhaler INHALE 1 PUFF INTO THE LUNGS 4 TIMES DAILY 4 g 9    ketoconazole (NIZORAL) 2 % external cream Apply topically daily 60 g 1    lisinopril (ZESTRIL) 40 MG tablet TAKE 1 TABLET BY MOUTH DAILY 90 tablet 3    OneTouch Delica Lancets 33G MISC Inject 1 each Subcutaneous 3 times daily (before meals) 100 each 11    order for DME Women briefs 50 each 1    primidone (MYSOLINE) 50 MG tablet Take 1 tablet (50 mg) by mouth at bedtime 30 tablet 2    tiotropium-olodaterol 2.5-2.5 MCG/ACT AERS Inhale 2 puffs into the lungs daily      atorvastatin (LIPITOR) 40 MG tablet TAKE 1 TABLET BY MOUTH AT BEDTIME 90 tablet 2    bisacodyl (DULCOLAX) 5 MG EC tablet Take 2 tablets (10 mg) by mouth once for 1 dose Refer to \"Getting Ready for a Colonoscopy\" instruction handout 2 tablet 0    polyethylene glycol (GOLYTELY) 236 g suspension " "Take 4,000 mLs by mouth once for 1 dose Refer to \"Getting Ready for a Colonoscopy\" instruction handout 4000 mL 0       No Known Allergies    REVIEW OF SYSTEMS  Skin: noted above  Eyes: negative, wears glasses    Ears/Nose/Throat: negative  Respiratory: No shortness of breath or hemoptysis; smoker's cough; hx of sleep apnea  Cardiovascular: negative  Gastrointestinal: negative  Genitourinary: negative  Musculoskeletal: negative; hx of left shoulder pain and left knee--depends on the weather   Neurologic: negative  Psychiatric: negative  Hematologic/Lymphatic/Immunologic: negative  Endocrine: positive for diabetes     OBJECTIVE:  /85   Pulse 88   Resp 16   Ht 1.613 m (5' 3.5\")   Wt 90.4 kg (199 lb 4.8 oz)   SpO2 92%   BMI 34.75 kg/m    Constitutional: alert and no distress  Respiratory:  Good diaphragmatic excursion.   Musculoskeletal: extremities normal- no gross deformities noted and gait normal  Skin: oily yellow flaky scales with pink base on forehead and nasal folds   Psychiatric: mentation appears normal and affect normal/bright      LABS  No results found for any visits on 07/18/24.    ASSESSMENT / PLAN:  (F17.200) Nicotine dependence, uncomplicated, unspecified nicotine product type  (primary encounter diagnosis)  Comment: noted, encouraged to quit  Plan:   Spoke with patient regarding options for smoking cessation.  Various pharmacologic options and behavioral techniques were reviewed.  The relative effectiveness as well as risks and benefits were reviewed with patient.  Patient is interested in: No interventions    Smoking Cessation    Let us know when ready    (L21.9) Seborrheic dermatitis  Comment: noted  Plan: ketoconazole (NIZORAL) 2 % external cream          Cream ordered  Use as directed    (E11.65,  Z79.4) Type 2 diabetes mellitus with hyperglycemia, with long-term current use of insulin (H)  Comment: noted insulin pump and CGM download    Insulin pump  Basal: 31.9 (100%)    Insulin " pump  Omnipod [place]  Date: 7/18/24  Basal: 1.2  Total basal: 28.8  CR:15  ISF: 50  BG target: 120  BG correction: 130  Active insulin: 4 hours    Plan: Insulin Disposable Pump (OMNIPOD DASH PODS, GEN        4,) MISC, GLUCOSE MONITOR, 72 HOUR, PHYS INTERP    TBR, 6%  I know the Bud tends to read a little lower  Didn't feel it   Encouraged her to confirm with a BG     Noted no lower BGs in the past 8 days.    No insulin pump changes at this time  Please let me know if you continue to have lower BGs.     Follow up  As scheduled.      Call sooner if any issues    Patient Instructions   Continue working on healthy eating and moving (start low and slow, work up to 30 min, 5x/week)    BG goals:  Fasting and before meals <130, >70  2 hour after eating <180    We only need 1/2 of these numbers to be within target then your A1c will be within target    Medication changes   None today    Keep working on entering carb bolus every time you consume carbs    Follow up   As discussed    Call me sooner if any problems/concerns and/or questions develop including consistent low BGs <70 or consistent high BGs >200  [phone] (Unit Coordinator)    [phone] (Anna, Nurse)    Smoking:   Keep working on quitting    Please keep your schedule appointment with Amberly BARNES NP    Time: 25 minutes  Barrier: see [facility]  Willingness to learn: accepting    Courtney PINTO [facility]  Diabetes and Wound Care    Cc: Amberly Whyte NP    With the electronic record, we can now more quickly and easily track our patient diabetic goals. Our diabetes clinical review is in progress and these are the indicators we are monitoring for good diabetes health.     1.) HbA1C less than 7 (measurement of your average blood sugars)  2.) Blood Pressure less than 140/80  3.) LDL less than 100 (bad cholesterol)  4.) HbA1C is checked in the last 6 months and below 7% (more frequently if not at goal or adjusting medications)  5.) LDL is checked in the last 12 months (more  frequently if not at goal or adjusting medications)  6.) Taking one baby aspirin daily (unless otherwise instructed)  7.) No tobacco use  8) Statin use     You have achieved 6 out of 8 of these and I am encouraging you to come in and get tuned up to achieve 8 out of 8!  Here is what you have achieved so far in my goals for you:  1.) HbA1C  less than 7:                              NO  Your last  HbA1C :  Lab Results   Component Value Date    A1C 9.7 05/16/2024    A1C 10.2 02/16/2024    A1C 11.0 11/15/2023    A1C 13.0 07/19/2023    A1C 15.1 04/19/2023    A1C >14.0 05/10/2021    A1C 10.7 02/10/2021    A1C 7.6 11/09/2020    A1C 7.9 08/07/2020    A1C 8.1 05/04/2020     2.) Blood Pressure less than 140/80:       YES     Your last                       BP Readings from Last 1 Encounters:   07/18/24 138/85     3.) LDL less than 100:                              YES      Your last     LDL Cholesterol Calculated   Date Value Ref Range Status   05/16/2024 89 <=100 mg/dL Final   11/09/2020 74 <100 mg/dL Final     Comment:     Desirable:       <100 mg/dl       4.) Checked HbA1C in the past 6 months: YES      5.) Checked LDL in the past 12 months:    YES      6.) Taking one aspirin daily:                       YES     7.) No tobacco use:                                        NO   8.) Statin use      YES       CGM criteria   Patient has been seen in the clinic within the last 6 month  Diagnosis of Type 2 diabetes  Has been testing their BGs 4x/day using personal CGM  Uses an insulin pump   Requires frequent adjustments to their treatment regimen based on BGM and/or CGM testing results.

## 2024-07-20 ENCOUNTER — MEDICAL CORRESPONDENCE (OUTPATIENT)
Dept: HEALTH INFORMATION MANAGEMENT | Facility: HOSPITAL | Age: 59
End: 2024-07-20

## 2024-07-20 ENCOUNTER — MEDICAL CORRESPONDENCE (OUTPATIENT)
Dept: HEALTH INFORMATION MANAGEMENT | Facility: CLINIC | Age: 59
End: 2024-07-20
Payer: COMMERCIAL

## 2024-07-23 ENCOUNTER — HOSPITAL ENCOUNTER (OUTPATIENT)
Facility: HOSPITAL | Age: 59
End: 2024-07-23
Attending: SURGERY | Admitting: SURGERY
Payer: COMMERCIAL

## 2024-07-23 DIAGNOSIS — E78.5 HYPERLIPIDEMIA, UNSPECIFIED HYPERLIPIDEMIA TYPE: ICD-10-CM

## 2024-07-23 RX ORDER — BISACODYL 5 MG/1
10 TABLET, DELAYED RELEASE ORAL ONCE
Qty: 2 TABLET | Refills: 0 | Status: SHIPPED | OUTPATIENT
Start: 2024-07-23 | End: 2024-07-23

## 2024-07-23 RX ORDER — ATORVASTATIN CALCIUM 40 MG/1
TABLET, FILM COATED ORAL
Qty: 90 TABLET | Refills: 2 | Status: SHIPPED | OUTPATIENT
Start: 2024-07-23

## 2024-07-23 NOTE — PATIENT INSTRUCTIONS
Continue working on healthy eating and moving (start low and slow, work up to 30 min, 5x/week)    BG goals:  Fasting and before meals <130, >70  2 hour after eating <180    We only need 1/2 of these numbers to be within target then your A1c will be within target    Medication changes   None today    Keep working on entering carb bolus every time you consume carbs    Follow up   As discussed    Call me sooner if any problems/concerns and/or questions develop including consistent low BGs <70 or consistent high BGs >200  [phone] (Unit Coordinator)    [phone] (Anna, Nurse)    Smoking:   Keep working on quitting    Please keep your schedule appointment with Amberly BARNES NP

## 2024-07-23 NOTE — TELEPHONE ENCOUNTER
Patient scheduled for screening colonoscopy with Dr. Cannon on 12/12/24. Nulytely bowel preparation instructions and script given and sent to [facility] .   Patient does  need a pre-op. Patient notified to schedule preop prior to procedure with her regular MD. Guide to your Colonoscopy or Upper GI Endoscopy and prep instructions sent to patient via US Mail.

## 2024-07-31 NOTE — PROGRESS NOTES
Chief Complaint   Patient presents with    Follow Up     Follow up/repeat scope         Marly states she presents for ENT exam. She has no sore throat or throat pain. She has been doing well since her last visit. She has been eating and drinking well. No concerns with vocal changes.      Patient denies sore throat or throat pain. Denies oral sores or lesions.   Denies chronic otalgia.   Denies fevers, night sweats or weight loss.   Denies dysphagia or dysphonia.   Denies globus sensation.   Denies nasal congestion, allergies.   Denies CRS, post nasal drainage.   Reports breathing has been well. She has been using inhaler with good results.    She does have inhaler and uses PRN.     Water- one bottle daily.   Caffeine- 2-3 cups coffee.   ETOH- None  Tobacco- 1 ppd.   Reflux- Intermittent, rare.        No past medical history on file.   No Known Allergies  Current Outpatient Medications   Medication Sig Dispense Refill    Acetaminophen (TYLENOL PO) Take 325 mg by mouth every 6 hours as needed      amLODIPine (NORVASC) 5 MG tablet Take 1 tablet (5 mg) by mouth daily 90 tablet 3    aspirin (ASPIRIN LOW DOSE) 81 MG chewable tablet CHEW AND SWALLOW 1 TABLET BY MOUTH DAILY 90 tablet 0    atorvastatin (LIPITOR) 40 MG tablet TAKE 1 TABLET BY MOUTH AT BEDTIME 90 tablet 2    blood glucose (ONETOUCH ULTRA) test strip USE TO TEST BLOOD SUGAR 4 TIMES DAILY 100 strip 4    Cholecalciferol (VITAMIN D3) 50 MCG (2000 UT) CAPS TAKE 1 CAPSULE BY MOUTH DAILY 90 capsule 3    Continuous Blood Gluc Sensor (FREESTYLE HANK 2 SENSOR) MISC CHANGE 1 SENSOR EVERY 14 DAYS, USE TO READ BLOOD SUGARS PER MANUFACTURERS INSTRUCTIONS 2 each 11    empagliflozin (JARDIANCE) 25 MG TABS tablet Take 1 tablet (25 mg) by mouth daily 90 tablet 3    insulin aspart (NOVOLOG VIAL) 100 UNITS/ML vial TO BE USED WITH INSULIN PUMP, MAX DAILY UNITS 70 30 mL 3    Insulin Disposable Pump (OMNIPOD DASH PODS, GEN 4,) MISC Inject 1 pod subcutaneously every 48 hours 15  "each 4    insulin glargine U-300 (TOUJEO SOLOSTAR) 300 UNIT/ML (1 units dial) pen Inject 20 Units Subcutaneous At Bedtime 9 mL 1    insulin pen needle (32G X 4 MM) 32G X 4 MM miscellaneous Use 1 pen needles daily 100 each 3    INSULIN PUMP - OUTPATIENT Insulin pump  Omnipod Dash  Date: 7/18/24  Basal: 1.2  Total basal: 28.8  CR:15  ISF: 50  BG target: 120  BG correction: 130  Active insulin: 4 hours      ipratropium-albuterol (COMBIVENT RESPIMAT)  MCG/ACT inhaler INHALE 1 PUFF INTO THE LUNGS 4 TIMES DAILY 4 g 9    ketoconazole (NIZORAL) 2 % external cream Apply topically daily 60 g 1    lisinopril (ZESTRIL) 40 MG tablet TAKE 1 TABLET BY MOUTH DAILY 90 tablet 3    OneTouch Delica Lancets 33G MISC Inject 1 each Subcutaneous 3 times daily (before meals) 100 each 11    order for DME Women briefs 50 each 1    primidone (MYSOLINE) 50 MG tablet Take 1 tablet (50 mg) by mouth at bedtime 30 tablet 2    tiotropium-olodaterol 2.5-2.5 MCG/ACT AERS Inhale 2 puffs into the lungs daily       No current facility-administered medications for this visit.     ROS- SEE HPI  BP (!) 152/93 (BP Location: Right arm, Patient Position: Sitting, Cuff Size: Adult Large)   Pulse 85   Temp 98.4  F (36.9  C) (Tympanic)   Resp 16   Ht 1.613 m (5' 3.5\")   Wt 90.4 kg (199 lb 4.7 oz)   SpO2 91%   BMI 34.75 kg/m      General - The patient is well nourished and well developed, and appears to have good nutritional status.  Alert and oriented to person and place, answers questions and cooperates with examination appropriately.   Head and Face - Normocephalic and atraumatic, with no gross asymmetry noted.  The facial nerve is intact, with strong symmetric movements.  Microgenia, significant mandible changes noted.  Limiting oral airway  Voice and Breathing - The patient was breathing comfortably without the use of accessory muscles. There was no wheezing, stridor, or stertor.  The patients voice was clear and strong, and had appropriate pitch " and quality.  On examination of the ears, I found that the ear(s) were completely impacted with dense cerumen.  Therefore, I positioned them in the examination chair in a semi-supine position, beginning with the right side.  I used the binocular surgical microscope to perform cerumen removal.  I began by using a cerumen loop to gently lift the edges of the cerumen mass away from the walls of the external canal.  Once I did this, I was able to suction away fragments of wax and debris using suction.  Once the mass was loose enough, the entire plug was pulled from the canal.  The tympanic membrane was intact, no sign of perforation or middle ear effusion.     I turned my attention to the contralateral side once again using the binocular surgical microscope to perform cerumen removal.  I began by using a cerumen loop to gently lift the edges of the cerumen mass away from the walls of the external canal.  Once I did this, I was able to suction away fragments of wax and debris using suction.  Once the mass was loose enough, the entire plug was pulled from the canal.  The tympanic membrane was intact, no sign of perforation or middle ear effusion.     Eyes - Extraocular movements intact, and the pupils were reactive to light.  Sclera were not icteric or injected, conjunctiva were pink and moist.  Mouth - Examination of the oral cavity showed pink, healthy oral mucosa. No lesions or ulcerations noted.  The tongue was mobile and midline, and edentulous  Patient is hard to obtain a good oral exam due to microgenia and FTP III  Throat - The walls of the oropharynx were smooth, pink, moist, symmetric, and had no lesions or ulcerations.  The tonsillar pillars and soft palate were symmetric.  The uvula was midline on elevation.    Neck - Normal midline excursion of the laryngotracheal complex during swallowing.  Full range of motion on passive movement.  Palpation of the occipital, submental, submandibular, internal jugular  chain, and supraclavicular nodes did not demonstrate any abnormal lymph nodes or masses.  Palpation of the thyroid was soft and smooth, with no nodules or goiter appreciated.  The trachea was mobile and midline.  Nose - External contour is symmetric, no gross deflection or scars.  Nasal mucosa is pink and moist with no abnormal mucus.       Flexible Endoscopy -      The patient was counseled that their symptoms and history require a direct visualization with an endoscopy procedure.  They understood and we proceeded with a fiberoptic examination.  First I sprayed both sides of the nose with a mixture of lidocaine and neosynephrine.  I then passed the scope through the nasal cavity.  The nasal cavity was unremarkable.  The nasopharynx was mucosally covered and symmetric.  The Eustachian tube openings were unobstructed.  Going further down I had a clear view of the base of tongue which had normal appearing lingual tonsillar tissue.  The base of tongue was free of lesions, and the vallecula was open. Mild asymmetry on exam with left lingual tonsil prominent.  The epiglottis was smooth and mucosally covered.  The supraglottic larynx was then clearly visualized.  The vocal cords moved smoothly and symmetrically, they were pearly white and no lesions were seen.  The pyriform sinuses were open, and the limited view of the postcricoid region did not show any lesions.       ASSESSMENT/ PLAN:    ICD-10-CM    1. Globus sensation  R09.A2 lidocaine 2%-oxymetazoline 0.025% nasal solution 2 spray      2. 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 Tobacco abuse  Z72.0         Continue with current plan.   She has been doing well.   Repeat scope in 4 months.   Quit tobacco.       She was happy with this plan.     April Forrester PA-C  ENT  [facility], [place]

## 2024-08-01 ENCOUNTER — OFFICE VISIT (OUTPATIENT)
Dept: OTOLARYNGOLOGY | Facility: OTHER | Age: 59
End: 2024-08-01
Attending: PHYSICIAN ASSISTANT
Payer: COMMERCIAL

## 2024-08-01 VITALS
SYSTOLIC BLOOD PRESSURE: 152 MMHG | WEIGHT: 199.3 LBS | OXYGEN SATURATION: 91 % | HEIGHT: 64 IN | BODY MASS INDEX: 34.02 KG/M2 | RESPIRATION RATE: 16 BRPM | TEMPERATURE: 98.4 F | DIASTOLIC BLOOD PRESSURE: 93 MMHG | HEART RATE: 85 BPM

## 2024-08-01 DIAGNOSIS — Z72.0 TOBACCO ABUSE: ICD-10-CM

## 2024-08-01 DIAGNOSIS — J38.7 VALLECULAR CYST: ICD-10-CM

## 2024-08-01 DIAGNOSIS — J35.1 LINGUAL TONSIL HYPERTROPHY: ICD-10-CM

## 2024-08-01 DIAGNOSIS — R09.A2 GLOBUS SENSATION: Primary | ICD-10-CM

## 2024-08-01 PROCEDURE — 69210 REMOVE IMPACTED EAR WAX UNI: CPT | Performed by: PHYSICIAN ASSISTANT

## 2024-08-01 PROCEDURE — 31575 DIAGNOSTIC LARYNGOSCOPY: CPT | Performed by: PHYSICIAN ASSISTANT

## 2024-08-01 PROCEDURE — 99212 OFFICE O/P EST SF 10 MIN: CPT | Mod: 25 | Performed by: PHYSICIAN ASSISTANT

## 2024-08-01 PROCEDURE — G0463 HOSPITAL OUTPT CLINIC VISIT: HCPCS | Mod: 25

## 2024-08-01 ASSESSMENT — PAIN SCALES - GENERAL: PAINLEVEL: NO PAIN (0)

## 2024-08-01 NOTE — LETTER
8/1/2024      Marly Cannon  [address]  [place] [address]      Dear Colleague,    Thank you for referring your patient, Marly Cannon, to the [facility]. Please see a copy of my visit note below.    Chief Complaint   Patient presents with     Follow Up     Follow up/repeat scope         Marly states she presents for ENT exam. She has no sore throat or throat pain. She has been doing well since her last visit. She has been eating and drinking well. No concerns with vocal changes.      Patient denies sore throat or throat pain. Denies oral sores or lesions.   Denies chronic otalgia.   Denies fevers, night sweats or weight loss.   Denies dysphagia or dysphonia.   Denies globus sensation.   Denies nasal congestion, allergies.   Denies CRS, post nasal drainage.   Reports breathing has been well. She has been using inhaler with good results.    She does have inhaler and uses PRN.     Water- one bottle daily.   Caffeine- 2-3 cups coffee.   ETOH- None  Tobacco- 1 ppd.   Reflux- Intermittent, rare.        No past medical history on file.   No Known Allergies  Current Outpatient Medications   Medication Sig Dispense Refill     Acetaminophen (TYLENOL PO) Take 325 mg by mouth every 6 hours as needed       amLODIPine (NORVASC) 5 MG tablet Take 1 tablet (5 mg) by mouth daily 90 tablet 3     aspirin (ASPIRIN LOW DOSE) 81 MG chewable tablet CHEW AND SWALLOW 1 TABLET BY MOUTH DAILY 90 tablet 0     atorvastatin (LIPITOR) 40 MG tablet TAKE 1 TABLET BY MOUTH AT BEDTIME 90 tablet 2     blood glucose (ONETOUCH ULTRA) test strip USE TO TEST BLOOD SUGAR 4 TIMES DAILY 100 strip 4     Cholecalciferol (VITAMIN D3) 50 MCG (2000 UT) CAPS TAKE 1 CAPSULE BY MOUTH DAILY 90 capsule 3     Continuous Blood Gluc Sensor (FREESTYLE HANK 2 SENSOR) MIS CHANGE 1 SENSOR EVERY 14 DAYS, USE TO READ BLOOD SUGARS PER MANUFACTURERS INSTRUCTIONS 2 each 11     empagliflozin (JARDIANCE) 25 MG TABS tablet Take 1 tablet (25  "mg) by mouth daily 90 tablet 3     insulin aspart (NOVOLOG VIAL) 100 UNITS/ML vial TO BE USED WITH INSULIN PUMP, MAX DAILY UNITS 70 30 mL 3     Insulin Disposable Pump (OMNIPOD DASH PODS, GEN 4,) MISC Inject 1 pod subcutaneously every 48 hours 15 each 4     insulin glargine U-300 (TOUJEO SOLOSTAR) 300 UNIT/ML (1 units dial) pen Inject 20 Units Subcutaneous At Bedtime 9 mL 1     insulin pen needle (32G X 4 MM) 32G X 4 MM miscellaneous Use 1 pen needles daily 100 each 3     INSULIN PUMP - OUTPATIENT Insulin pump  Omnipod Dash  Date: 7/18/24  Basal: 1.2  Total basal: 28.8  CR:15  ISF: 50  BG target: 120  BG correction: 130  Active insulin: 4 hours       ipratropium-albuterol (COMBIVENT RESPIMAT)  MCG/ACT inhaler INHALE 1 PUFF INTO THE LUNGS 4 TIMES DAILY 4 g 9     ketoconazole (NIZORAL) 2 % external cream Apply topically daily 60 g 1     lisinopril (ZESTRIL) 40 MG tablet TAKE 1 TABLET BY MOUTH DAILY 90 tablet 3     OneTouch Delica Lancets 33G MISC Inject 1 each Subcutaneous 3 times daily (before meals) 100 each 11     order for DME Women briefs 50 each 1     primidone (MYSOLINE) 50 MG tablet Take 1 tablet (50 mg) by mouth at bedtime 30 tablet 2     tiotropium-olodaterol 2.5-2.5 MCG/ACT AERS Inhale 2 puffs into the lungs daily       No current facility-administered medications for this visit.     ROS- SEE HPI  BP (!) 152/93 (BP Location: Right arm, Patient Position: Sitting, Cuff Size: Adult Large)   Pulse 85   Temp 98.4  F (36.9  C) (Tympanic)   Resp 16   Ht 1.613 m (5' 3.5\")   Wt 90.4 kg (199 lb 4.7 oz)   SpO2 91%   BMI 34.75 kg/m      General - The patient is well nourished and well developed, and appears to have good nutritional status.  Alert and oriented to person and place, answers questions and cooperates with examination appropriately.   Head and Face - Normocephalic and atraumatic, with no gross asymmetry noted.  The facial nerve is intact, with strong symmetric movements.  Microgenia, significant " mandible changes noted.  Limiting oral airway  Voice and Breathing - The patient was breathing comfortably without the use of accessory muscles. There was no wheezing, stridor, or stertor.  The patients voice was clear and strong, and had appropriate pitch and quality.  On examination of the ears, I found that the ear(s) were completely impacted with dense cerumen.  Therefore, I positioned them in the examination chair in a semi-supine position, beginning with the right side.  I used the binocular surgical microscope to perform cerumen removal.  I began by using a cerumen loop to gently lift the edges of the cerumen mass away from the walls of the external canal.  Once I did this, I was able to suction away fragments of wax and debris using suction.  Once the mass was loose enough, the entire plug was pulled from the canal.  The tympanic membrane was intact, no sign of perforation or middle ear effusion.     I turned my attention to the contralateral side once again using the binocular surgical microscope to perform cerumen removal.  I began by using a cerumen loop to gently lift the edges of the cerumen mass away from the walls of the external canal.  Once I did this, I was able to suction away fragments of wax and debris using suction.  Once the mass was loose enough, the entire plug was pulled from the canal.  The tympanic membrane was intact, no sign of perforation or middle ear effusion.     Eyes - Extraocular movements intact, and the pupils were reactive to light.  Sclera were not icteric or injected, conjunctiva were pink and moist.  Mouth - Examination of the oral cavity showed pink, healthy oral mucosa. No lesions or ulcerations noted.  The tongue was mobile and midline, and edentulous  Patient is hard to obtain a good oral exam due to microgenia and FTP III  Throat - The walls of the oropharynx were smooth, pink, moist, symmetric, and had no lesions or ulcerations.  The tonsillar pillars and soft palate  were symmetric.  The uvula was midline on elevation.    Neck - Normal midline excursion of the laryngotracheal complex during swallowing.  Full range of motion on passive movement.  Palpation of the occipital, submental, submandibular, internal jugular chain, and supraclavicular nodes did not demonstrate any abnormal lymph nodes or masses.  Palpation of the thyroid was soft and smooth, with no nodules or goiter appreciated.  The trachea was mobile and midline.  Nose - External contour is symmetric, no gross deflection or scars.  Nasal mucosa is pink and moist with no abnormal mucus.       Flexible Endoscopy -      The patient was counseled that their symptoms and history require a direct visualization with an endoscopy procedure.  They understood and we proceeded with a fiberoptic examination.  First I sprayed both sides of the nose with a mixture of lidocaine and neosynephrine.  I then passed the scope through the nasal cavity.  The nasal cavity was unremarkable.  The nasopharynx was mucosally covered and symmetric.  The Eustachian tube openings were unobstructed.  Going further down I had a clear view of the base of tongue which had normal appearing lingual tonsillar tissue.  The base of tongue was free of lesions, and the vallecula was open. Mild asymmetry on exam with left lingual tonsil prominent.  The epiglottis was smooth and mucosally covered.  The supraglottic larynx was then clearly visualized.  The vocal cords moved smoothly and symmetrically, they were pearly white and no lesions were seen.  The pyriform sinuses were open, and the limited view of the postcricoid region did not show any lesions.       ASSESSMENT/ PLAN:    ICD-10-CM    1. Globus sensation  R09.A2 lidocaine 2%-oxymetazoline 0.025% nasal solution 2 spray      2. 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 Tobacco abuse  Z72.0         Continue with current plan.   She has been doing well.   Repeat scope in 4  months.   Quit tobacco.       She was happy with this plan.     April Forrester PA-C  [facility] [place]               Again, thank you for allowing me to participate in the care of your patient.        Sincerely,        April Forrester PA-C

## 2024-08-01 NOTE — PATIENT INSTRUCTIONS
Throat/ larynx appears stable.   Follow up in 4 months for repeat exam w/ Dr. Jaimes   Work on tobacco cessation.   Maintain good water intake.       Thank you for allowing April Forrester PA-C and our ENT team to participate in your care.  If your medications are too expensive, please give the nurse a call.  We can possibly change this medication.  If you have a scheduling or an appointment question please contact our Health Unit Coordinator at [phone], Ext. [phone].    ALL nursing questions or concerns can be directed to your ENT nurse at: [phone] Jess[place]

## 2024-08-27 DIAGNOSIS — I10 ESSENTIAL HYPERTENSION: ICD-10-CM

## 2024-08-27 RX ORDER — LISINOPRIL 40 MG/1
TABLET ORAL
Qty: 90 TABLET | Refills: 0 | Status: SHIPPED | OUTPATIENT
Start: 2024-08-27

## 2024-08-27 NOTE — TELEPHONE ENCOUNTER
Lisinopril 40 MG      Last Written Prescription Date:  08/30/23  Last Fill Quantity: 90,   # refills: 3  Last Office Visit: 05/16/24  Future Office visit:       Routing refill request to provider for review/approval because:  Blood pressure under 140/90 in past 12 months- Clinicial or Patient Reported        BP Readings from Last 3 Encounters:   08/01/24 (!) 152/93   07/18/24 138/85   05/16/24 118/74

## 2024-09-11 DIAGNOSIS — I10 ESSENTIAL HYPERTENSION: Primary | ICD-10-CM

## 2024-09-11 DIAGNOSIS — G25.0 ESSENTIAL TREMOR: ICD-10-CM

## 2024-09-11 RX ORDER — ASPIRIN 81 MG/1
TABLET, CHEWABLE ORAL
Qty: 90 TABLET | Refills: 0 | Status: SHIPPED | OUTPATIENT
Start: 2024-09-11

## 2024-09-11 RX ORDER — PRIMIDONE 50 MG/1
50 TABLET ORAL AT BEDTIME
Qty: 30 TABLET | Refills: 0 | Status: SHIPPED | OUTPATIENT
Start: 2024-09-11

## 2024-09-11 NOTE — TELEPHONE ENCOUNTER
Primidone 50 MG      Last Written Prescription Date:  06/18/24  Last Fill Quantity: 30,   # refills: 2  Last Office Visit: 05/16/24  Future Office visit:       Routing refill request to provider for review/approval because:  Drug not on the [facility], P or [facility] refill protocol or controlled substance

## 2024-09-13 ENCOUNTER — MEDICAL CORRESPONDENCE (OUTPATIENT)
Dept: HEALTH INFORMATION MANAGEMENT | Facility: CLINIC | Age: 59
End: 2024-09-13
Payer: COMMERCIAL

## 2024-09-16 DIAGNOSIS — Z53.9 PERSONS ENCOUNTERING HEALTH SERVICES FOR SPECIFIC PROCEDURES, NOT CARRIED OUT: Primary | ICD-10-CM

## 2024-09-16 PROCEDURE — G0179 MD RECERTIFICATION HHA PT: HCPCS | Performed by: NURSE PRACTITIONER

## 2024-09-23 DIAGNOSIS — Z79.4 TYPE 2 DIABETES MELLITUS WITH HYPERGLYCEMIA, WITH LONG-TERM CURRENT USE OF INSULIN (H): ICD-10-CM

## 2024-09-23 DIAGNOSIS — E11.65 TYPE 2 DIABETES MELLITUS WITH HYPERGLYCEMIA, WITH LONG-TERM CURRENT USE OF INSULIN (H): ICD-10-CM

## 2024-09-23 NOTE — TELEPHONE ENCOUNTER
Bud sensor      Last Written Prescription Date:  10/05/23  Last Fill Quantity: ,   # refills: 11  Last Office Visit: 07/18/24  Future Office visit:

## 2024-10-15 DIAGNOSIS — G25.0 ESSENTIAL TREMOR: ICD-10-CM

## 2024-10-15 RX ORDER — PRIMIDONE 50 MG/1
50 TABLET ORAL AT BEDTIME
Qty: 30 TABLET | Refills: 4 | Status: SHIPPED | OUTPATIENT
Start: 2024-10-15

## 2024-10-15 NOTE — TELEPHONE ENCOUNTER
primidone (MYSOLINE) 50 MG tablet         Last Written Prescription Date:  9/11/24  Last Fill Quantity: 30,   # refills: 0  Last Office Visit: 5/16/24  Future Office visit:       Routing refill request to provider for review/approval because:  Drug not on the [facility], P or [facility] refill protocol or controlled substance

## 2024-10-30 ENCOUNTER — ALLIED HEALTH/NURSE VISIT (OUTPATIENT)
Dept: EDUCATION SERVICES | Facility: OTHER | Age: 59
End: 2024-10-30
Attending: NURSE PRACTITIONER
Payer: COMMERCIAL

## 2024-10-30 VITALS
BODY MASS INDEX: 34.42 KG/M2 | DIASTOLIC BLOOD PRESSURE: 78 MMHG | HEART RATE: 94 BPM | HEIGHT: 64 IN | OXYGEN SATURATION: 93 % | RESPIRATION RATE: 16 BRPM | WEIGHT: 201.6 LBS | SYSTOLIC BLOOD PRESSURE: 112 MMHG

## 2024-10-30 DIAGNOSIS — Z79.4 TYPE 2 DIABETES MELLITUS WITH HYPERGLYCEMIA, WITH LONG-TERM CURRENT USE OF INSULIN (H): ICD-10-CM

## 2024-10-30 DIAGNOSIS — F17.200 NICOTINE DEPENDENCE, UNCOMPLICATED, UNSPECIFIED NICOTINE PRODUCT TYPE: Primary | ICD-10-CM

## 2024-10-30 DIAGNOSIS — E11.65 TYPE 2 DIABETES MELLITUS WITH HYPERGLYCEMIA, WITH LONG-TERM CURRENT USE OF INSULIN (H): ICD-10-CM

## 2024-10-30 PROCEDURE — G0463 HOSPITAL OUTPT CLINIC VISIT: HCPCS

## 2024-10-30 PROCEDURE — 99214 OFFICE O/P EST MOD 30 MIN: CPT | Performed by: NURSE PRACTITIONER

## 2024-10-30 PROCEDURE — 95251 CONT GLUC MNTR ANALYSIS I&R: CPT | Performed by: NURSE PRACTITIONER

## 2024-10-30 RX ORDER — INSULIN PUMP CONTROLLER
1 EACH MISCELLANEOUS
Qty: 15 EACH | Refills: 5 | Status: SHIPPED | OUTPATIENT
Start: 2024-10-30

## 2024-10-30 ASSESSMENT — PAIN SCALES - GENERAL: PAINLEVEL_OUTOF10: NO PAIN (0)

## 2024-10-30 NOTE — PROGRESS NOTES
SUBJECTIVE:  Marly Cannon, 59 year old, female presents with the following Chief Complaint(s) with HPI to follow:  Chief Complaint   Patient presents with    Diabetes        Diabetes Follow-up  Patient is checking blood sugars: >4x/day using her personal CGM (Freestyle Bud).  Results:    Date: 10/17-10/30/24  Glucose management indicator: n/a    Average glucose: 236    Glucose range  Very low (<54): 0  low (<70): 1%  In target range (): 32%  High (>180): 28%  Very high (>250): 39%      Symptoms of hypoglycemia (low blood sugar): rare  Paresthesias (numbness or burning in feet) or sores: no, no sores  Diabetic eye exam within the last year: UTD  Breakfast eaten regularly: most of the time  Patient counting carbs: NO  Exercising: depends on the weather          HPI:  Marly's here today for the follow up regarding her Diabetes mellitus, Type 2.    A1c trend:  Lab Results   Component Value Date    A1C 9.7 05/16/2024    A1C 10.2 02/16/2024    A1C 11.0 11/15/2023    A1C 13.0 07/19/2023    A1C 15.1 04/19/2023    A1C >14.0 05/10/2021    A1C 10.7 02/10/2021    A1C 7.6 11/09/2020    A1C 7.9 08/07/2020    A1C 8.1 05/04/2020   Current Diabetes medication:   1. Omnipod dash, using Novolog  2. Jardiance 25 mg daily  Statin use: yes, simvastatin 20 mg daily  ASA: yes, 81 mg daily    Marly reports the following:  No issues with the Freestyle Bud   No issues with insulin pump    Lost her brother in August  Some family issues regarding this     No new changes to her health    Weight trend   Wt Readings from Last 4 Encounters:   10/30/24 91.4 kg (201 lb 9.6 oz)   08/01/24 90.4 kg (199 lb 4.7 oz)   07/18/24 90.4 kg (199 lb 4.8 oz)   05/16/24 88.1 kg (194 lb 4.8 oz)       Patient Active Problem List   Diagnosis    Type 2 diabetes, HbA1c goal < 7% (H)    ACP (advance care planning)    Stage 3 chronic kidney disease (H)    Pulmonary emphysema (H)    Hyperparathyroidism due to renal insufficiency (H)    Vitamin D  deficiency    Intellectual disability    Breast fibrocystic disorder    Tobacco abuse    Microalbuminuria due to type 2 diabetes mellitus (H)    H/O colonoscopy    Webb cardiac risk <10% in next 10 years    Tubular adenoma of colon    Hyperlipidemia, unspecified hyperlipidemia type    Essential hypertension    Callus of foot    Memory disturbance    ADEEL (obstructive sleep apnea)    Tremor    Diabetic polyneuropathy associated with diabetes mellitus due to underlying condition (H)    Urinary incontinence, unspecified type       History reviewed. No pertinent past medical history.    Past Surgical History:   Procedure Laterality Date    BIOPSY BREAST Left 02/14/2008    patient states no bx, Clip in left breast/ stereo bxc 2-- no path in EPIC that far back    COLONOSCOPY N/A 08/20/2015    Procedure: COLONOSCOPY;  Surgeon: Gentyr Porter MD;  Location: [facility] OR    COLONOSCOPY N/A 09/21/2018    Procedure: COLONOSCOPY;  COLONOSCOPY WITH POLYPECTOMY;  Surgeon: Gentry Porter MD;  Location: [facility] OR       Family History   Problem Relation Age of Onset    Diabetes Mother     Diabetes Father     Hypertension Brother     Diabetes Sister        Social History     Tobacco Use    Smoking status: Every Day     Current packs/day: 1.00     Average packs/day: 1 pack/day for 45.9 years (45.9 ttl pk-yrs)     Types: Cigarettes     Start date: 1/1/1979     Passive exposure: Current    Smokeless tobacco: Never    Tobacco comments:     Declined QP 05/13/2020   Substance Use Topics    Alcohol use: No     Alcohol/week: 0.0 standard drinks of alcohol       Current Outpatient Medications   Medication Sig Dispense Refill    Acetaminophen (TYLENOL PO) Take 325 mg by mouth every 6 hours as needed      amLODIPine (NORVASC) 5 MG tablet Take 1 tablet (5 mg) by mouth daily 90 tablet 3    aspirin (ASPIRIN LOW DOSE) 81 MG chewable tablet CHEW AND SWALLOW 1 TABLET BY MOUTH DAILY 90 tablet 0    atorvastatin (LIPITOR) 40 MG tablet TAKE  1 TABLET BY MOUTH AT BEDTIME 90 tablet 2    blood glucose (ONETOUCH ULTRA) test strip USE TO TEST BLOOD SUGAR 4 TIMES DAILY 100 strip 4    Cholecalciferol (VITAMIN D3) 50 MCG (2000 UT) CAPS TAKE 1 CAPSULE BY MOUTH DAILY 90 capsule 3    Continuous Glucose Sensor (FREESTYLE HANK 2 SENSOR) MISC CHANGE 1 SENSOR EVERY 14 DAYS, USE TO READ BLOOD SUGARS PER MANUFACTURERS INSTRUCTIONS 6 each 3    empagliflozin (JARDIANCE) 25 MG TABS tablet Take 1 tablet (25 mg) by mouth daily 90 tablet 3    insulin aspart (NOVOLOG VIAL) 100 UNITS/ML vial TO BE USED WITH INSULIN PUMP, MAX DAILY UNITS 70 30 mL 3    Insulin Disposable Pump (OMNIPOD DASH PODS, GEN 4,) MISC Inject 1 pod subcutaneously every 48 hours. 15 each 5    insulin glargine U-300 (TOUJEO SOLOSTAR) 300 UNIT/ML (1 units dial) pen Inject 20 Units Subcutaneous At Bedtime 9 mL 1    insulin pen needle (32G X 4 MM) 32G X 4 MM miscellaneous Use 1 pen needles daily 100 each 3    INSULIN PUMP - OUTPATIENT Insulin pump  Omnipod Dash  Date: 7/18/24  Basal: 1.2  Total basal: 28.8  CR:15  ISF: 50  BG target: 120  BG correction: 130  Active insulin: 4 hours      ipratropium-albuterol (COMBIVENT RESPIMAT)  MCG/ACT inhaler INHALE 1 PUFF INTO THE LUNGS 4 TIMES DAILY 4 g 9    ketoconazole (NIZORAL) 2 % external cream Apply topically daily 60 g 1    lisinopril (ZESTRIL) 40 MG tablet TAKE 1 TABLET BY MOUTH DAILY 90 tablet 0    OneTouch Delica Lancets 33G MISC Inject 1 each Subcutaneous 3 times daily (before meals) 100 each 11    order for DME Women briefs 50 each 1    primidone (MYSOLINE) 50 MG tablet TAKE 1 TABLET BY MOUTH AT BEDTIME 30 tablet 4    tiotropium-olodaterol 2.5-2.5 MCG/ACT AERS Inhale 2 puffs into the lungs daily         No Known Allergies    REVIEW OF SYSTEMS  Skin: hx of seborrheic dermatitis   Eyes: negative, wears glasses    Ears/Nose/Throat: negative  Respiratory: No shortness of breath or hemoptysis; smoker's cough; hx of sleep apnea  Cardiovascular:  "negative  Gastrointestinal: negative  Genitourinary: negative  Musculoskeletal: negative; hx of left shoulder pain and left knee--depends on the weather   Neurologic: negative  Psychiatric: negative  Hematologic/Lymphatic/Immunologic: negative  Endocrine: positive for diabetes     OBJECTIVE:  /78   Pulse 94   Resp 16   Ht 1.613 m (5' 3.5\")   Wt 91.4 kg (201 lb 9.6 oz)   SpO2 93%   BMI 35.15 kg/m    Constitutional: alert and no distress  Respiratory:  Good diaphragmatic excursion.   Musculoskeletal: extremities normal- no gross deformities noted and gait normal  Skin: oily yellow flaky scales with pink base on forehead and nasal folds   Psychiatric: mentation appears normal and affect normal/bright      LABS  No results found for any visits on 10/30/24.    ASSESSMENT / PLAN:  (F17.200) Nicotine dependence, uncomplicated, unspecified nicotine product type  (primary encounter diagnosis)  Comment: noted, encouraged to quit  Plan:   Spoke with patient regarding options for smoking cessation.  Various pharmacologic options and behavioral techniques were reviewed.  The relative effectiveness as well as risks and benefits were reviewed with patient.  Patient is interested in: No interventions    Smoking Cessation    States she's down to less than 1 ppd  Let us know when you are ready    (E11.65,  Z79.4) Type 2 diabetes mellitus with hyperglycemia, with long-term current use of insulin (H)  Comment: noted insulin pump and CGM download          Insulin pump  Omnipod Dash  Date: 10/30/24  Basal: 1.2  Total basal: 28.8  CR:15  ISF: 50  BG target: 120  BG correction: 130  Active insulin: 4 hours    Plan: Insulin Disposable Pump (OMNIPOD DASH PODS, GEN        4,) MISC, GLUCOSE MONITOR, 72 HOUR, PHYS INTERP    Continued spikes of BGs in the morning after eating without any carb bolus entry  Education focused on this again  Demonstrated on how to do this again  Explained some is better than none    No insulin pump " changes at this time    Due for an A1c  Will check it in December    Follow up  As scheduled.      Call sooner if any issues    Patient Instructions   Continue working on healthy eating and moving (start low and slow, work up to 30 min, 5x/week)    BG goals:  Fasting and before meals <130, >70  2 hour after eating <180    We only need 1/2 of these numbers to be within target then your A1c will be within target    Medication changes   None today    Keep working on entering carb bolus every time you consume carbs    Follow up   As discussed    Call me sooner if any problems/concerns and/or questions develop including consistent low BGs <70 or consistent high BGs >200  [phone] (Unit Coordinator)    [phone] (Anna, Nurse)    Smoking:   Keep working on quitting    Please keep your schedule appointment with Amberly BARNES NP    Time: 30 minutes  Barrier: see PMH  Willingness to learn: accepting    Courtney PINTO [facility]  Diabetes and Wound Care    Cc: Amberly Whyte NP    30 minutes was spent with patient.    All of this time was spent on counseling patient regarding illness, medication and/or treatment options, coordinating further cares and follow ups that are needed along with resource material that will be helpful in the treatment of these issues.       With the electronic record, we can now more quickly and easily track our patient diabetic goals. Our diabetes clinical review is in progress and these are the indicators we are monitoring for good diabetes health.     1.) HbA1C less than 7 (measurement of your average blood sugars)  2.) Blood Pressure less than 140/80  3.) LDL less than 100 (bad cholesterol)  4.) HbA1C is checked in the last 6 months and below 7% (more frequently if not at goal or adjusting medications)  5.) LDL is checked in the last 12 months (more frequently if not at goal or adjusting medications)  6.) Taking one baby aspirin daily (unless otherwise instructed)  7.) No tobacco use  8) Statin  use     You have achieved 6 out of 8 of these and I am encouraging you to come in and get tuned up to achieve 8 out of 8!  Here is what you have achieved so far in my goals for you:  1.) HbA1C  less than 7:                              NO  Your last  HbA1C :  Lab Results   Component Value Date    A1C 9.7 05/16/2024    A1C 10.2 02/16/2024    A1C 11.0 11/15/2023    A1C 13.0 07/19/2023    A1C 15.1 04/19/2023    A1C >14.0 05/10/2021    A1C 10.7 02/10/2021    A1C 7.6 11/09/2020    A1C 7.9 08/07/2020    A1C 8.1 05/04/2020     2.) Blood Pressure less than 140/80:       YES     Your last                       BP Readings from Last 1 Encounters:   10/30/24 112/78     3.) LDL less than 100:                              YES      Your last     LDL Cholesterol Calculated   Date Value Ref Range Status   05/16/2024 89 <=100 mg/dL Final   11/09/2020 74 <100 mg/dL Final     Comment:     Desirable:       <100 mg/dl       4.) Checked HbA1C in the past 6 months: YES      5.) Checked LDL in the past 12 months:    YES      6.) Taking one aspirin daily:                       YES     7.) No tobacco use:                                        NO   8.) Statin use      YES       CGM criteria   Patient has been seen in the clinic within the last 6 month  Diagnosis of Type 2 diabetes  Has been testing their BGs 4x/day using personal CGM  Uses an insulin pump   Requires frequent adjustments to their treatment regimen based on BGM and/or CGM testing results.

## 2024-11-07 NOTE — PATIENT INSTRUCTIONS
Continue working on healthy eating and moving (start low and slow, work up to 30 min, 5x/week)    BG goals:  Fasting and before meals <130, >70  2 hour after eating <180    We only need 1/2 of these numbers to be within target then your A1c will be within target    Medication changes   None today    Keep working on entering carb bolus every time you consume carbs    Follow up   As discussed    Call me sooner if any problems/concerns and/or questions develop including consistent low BGs <70 or consistent high BGs >200  [phone] (Unit Coordinator)    [phone] (Anna, Nurse)    Smoking:   Keep working on quitting    Please keep your schedule appointment with Amberly BARNES NP

## 2024-11-15 DIAGNOSIS — Z53.9 PERSONS ENCOUNTERING HEALTH SERVICES FOR SPECIFIC PROCEDURES, NOT CARRIED OUT: Primary | ICD-10-CM

## 2024-11-15 PROCEDURE — G0179 MD RECERTIFICATION HHA PT: HCPCS | Performed by: NURSE PRACTITIONER

## 2024-11-18 ENCOUNTER — MEDICAL CORRESPONDENCE (OUTPATIENT)
Dept: HEALTH INFORMATION MANAGEMENT | Facility: HOSPITAL | Age: 59
End: 2024-11-18

## 2024-11-24 NOTE — PROGRESS NOTES
"Otolaryngology Progress Note    Marly Cannon is a 59 year old female presents for follow-up of globus pharyngeus and repeat laryngoscopy.      45.9-pack-year history of tobacco use with current 1 pack/day use  Former 2 ppd    Prior laryngoscopy by April about 4 months ago showing a vallecular cyst and lingual tonsil hypertrophy    Marly denies any current throat concerns.  No dysphagia or dysphonia  No globus  No fever, chills or weight loss  No pharyngitis or otalgia  No reflux  She denies heroic snoring or diya apnea    CT neck dated 4/10/2024 shows asymmetric effacement of the left vallecula and left piriform with prominent left lingual tonsil.  There is asymmetry of the submandibular glands without obvious mass.  The left vallecula is partially obstructed by a 6.4 x 5.4 mm lingual tonsil tissue and just caudal to this is a well-defined round mass consistent with a vallecular cyst.  This partially effaces the left vallecula the right vallecula is clear the left piriform is narrowed right piriform clear      No concerning cervical lymphadenopathy        Physical Exam  /86 (BP Location: Right arm, Patient Position: Sitting, Cuff Size: Adult Large)   Pulse 96   Temp 98  F (36.7  C) (Tympanic)   Resp 16   Ht 1.613 m (5' 3.5\")   Wt 91.4 kg (201 lb 8 oz)   SpO2 91%   BMI 35.13 kg/m    General - The patient is well nourished and well developed, and appears to have good nutritional status.  Alert and oriented to person and place, interactive.  Severe microgenia  Head and Face - Normocephalic and atraumatic, with no gross asymmetry noted of the contour of the facial features.  The facial nerve is intact, with strong symmetric movements.  Neck-no palpable lymphadenopathy or thyroid mass.  Trachea is midline.  Eyes - Extraocular movements intact.   Ears- External auditory canals are patent, tympanic membranes are intact without effusion or worrisome retractions   Nose - Nasal mucosa is pink and moist with " no abnormal mucus.  The septum was grossly midline and non-obstructive, turbinates of normal size and position.  No polyps, masses, or purulence noted on examination.  Mouth - Examination of the oral cavity shows pink, healthy, moist mucosa.  No lesions or ulceration noted.  Edentulous. the tongue is mobile and midline.    Throat - Mcnamara Palate Position IV, microgenia.  Difficult visualization of the oropharynx.  Using a combination of direct exam and flexible oral exam I can see that the uvula is midline and the anterior tonsillar pillars are symmetric.  I cannot visualize the entire oropharynx.      Attempts at mirror laryngoscopy were not possible due to gag reflex.  Therefore I proceeded with a fiberoptic examination after informed consent.  First I applied topical nasal lidocaine and neosynephrine.  I then passed the scope through the nasal cavity.     The nasopharynx was mucosally covered and symmetric.  The eustachian tube openings were unobstructed.  Going further down I had a clear view of the base of tongue which had normal appearing grade 4 lingual tonsillar tissue.  The left vallecula has slightly more lingual tonsil tissue c/w left, no mass or ulceration. The base of tongue was free of lesions. The epiglottis was smooth and mucosally covered.  The supraglottic larynx was then clearly visualized.  The vocal cords moved smoothly and symmetrically and were without mass or nodules.  Vocal cord mobility was normal bilaterally with phonation and respiration.  The pyriform sinuses were open and without diya mass or pooling of secretions, and clear upon valsalva but limited exam pyriform due to intolerance.    The patient tolerated the procedure well but did have some movement throughout.    Impression/Plan  Marly Cannon is a 59 year old female    ICD-10-CM    1. Microgenia  M26.06       2. Macroglossia  Q38.2       3. Hypertrophic soft palate  K13.79       4. Tobacco abuse counseling  Z71.6       5.  Lingual tonsil hypertrophy  J35.1         No lingual tonsil mass  Microgenia is leading to relative lingual tonsil hypertrophy.    High risk of SCC upper airway due to tobacco abuse  High risk of ADEEL due to oropharyngeal anatomy    This was all discussed    Follow-up as needed for chronic pharyngitis unilateral otalgia that is not acute otitis, unexplained weight loss.  She would need a CT neck with contrast prior to follow-up. I messaged Amberly Whyte NP    Sleep study was recommended but declined she denies any heroic snoring or diya apnea.    Tobacco cessation was strongly encouraged.  The associated risk of head and neck cancer was discussed.  Every year of cessation offers health benefits. This was discussed with the patient today and they voiced understanding.  Quit tools and a nicotine patch were offered.              Radha Jaimes D.O.  Otolaryngology/Head and Neck Surgery  Allergy

## 2024-11-25 ENCOUNTER — OFFICE VISIT (OUTPATIENT)
Dept: OTOLARYNGOLOGY | Facility: OTHER | Age: 59
End: 2024-11-25
Attending: OTOLARYNGOLOGY
Payer: COMMERCIAL

## 2024-11-25 VITALS
HEIGHT: 64 IN | HEART RATE: 96 BPM | DIASTOLIC BLOOD PRESSURE: 86 MMHG | WEIGHT: 201.5 LBS | BODY MASS INDEX: 34.4 KG/M2 | TEMPERATURE: 98 F | SYSTOLIC BLOOD PRESSURE: 128 MMHG | OXYGEN SATURATION: 91 % | RESPIRATION RATE: 16 BRPM

## 2024-11-25 DIAGNOSIS — Z71.6 TOBACCO ABUSE COUNSELING: ICD-10-CM

## 2024-11-25 DIAGNOSIS — K13.79 HYPERTROPHIC SOFT PALATE: ICD-10-CM

## 2024-11-25 DIAGNOSIS — Q38.2 MACROGLOSSIA: ICD-10-CM

## 2024-11-25 DIAGNOSIS — M26.06 MICROGENIA: Primary | ICD-10-CM

## 2024-11-25 DIAGNOSIS — J35.1 LINGUAL TONSIL HYPERTROPHY: ICD-10-CM

## 2024-11-25 PROCEDURE — G0463 HOSPITAL OUTPT CLINIC VISIT: HCPCS | Mod: 25

## 2024-11-25 PROCEDURE — 31575 DIAGNOSTIC LARYNGOSCOPY: CPT | Performed by: OTOLARYNGOLOGY

## 2024-11-25 ASSESSMENT — PAIN SCALES - GENERAL: PAINLEVEL_OUTOF10: NO PAIN (0)

## 2024-11-25 NOTE — PATIENT INSTRUCTIONS
Follow-up as needed with ENT for sore throat or ear concerns. Would need CT Neck prior to ENT visit, if sore throat.     Thank you for allowing Dr. Jaimes and our ENT team to participate in your care.  If your medications are too expensive, please give the nurse a call.  We can possibly change this medication.  If you have a scheduling or an appointment question please contact our Health Unit Coordinator at their direct line [phone].   ALL nursing questions or concerns can be directed to your ENT nurse, Kishan, at: [phone]

## 2024-11-25 NOTE — LETTER
"11/25/2024      Marly Cannon  [address]  [place] [address]      Dear Colleague,    Thank you for referring your patient, Marly Cannon, to the [facility] [place]. Please see a copy of my visit note below.    Otolaryngology Progress Note    Marly Cannon is a 59 year old female presents for follow-up of globus pharyngeus and repeat laryngoscopy.      45.9-pack-year history of tobacco use with current 1 pack/day use  Former 2 ppd    Prior laryngoscopy by April about 4 months ago showing a vallecular cyst and lingual tonsil hypertrophy    Marly denies any current throat concerns.  No dysphagia or dysphonia  No globus  No fever, chills or weight loss  No pharyngitis or otalgia  No reflux  She denies heroic snoring or diya apnea    CT neck dated 4/10/2024 shows asymmetric effacement of the left vallecula and left piriform with prominent left lingual tonsil.  There is asymmetry of the submandibular glands without obvious mass.  The left vallecula is partially obstructed by a 6.4 x 5.4 mm lingual tonsil tissue and just caudal to this is a well-defined round mass consistent with a vallecular cyst.  This partially effaces the left vallecula the right vallecula is clear the left piriform is narrowed right piriform clear      No concerning cervical lymphadenopathy        Physical Exam  /86 (BP Location: Right arm, Patient Position: Sitting, Cuff Size: Adult Large)   Pulse 96   Temp 98  F (36.7  C) (Tympanic)   Resp 16   Ht 1.613 m (5' 3.5\")   Wt 91.4 kg (201 lb 8 oz)   SpO2 91%   BMI 35.13 kg/m    General - The patient is well nourished and well developed, and appears to have good nutritional status.  Alert and oriented to person and place, interactive.  Severe microgenia  Head and Face - Normocephalic and atraumatic, with no gross asymmetry noted of the contour of the facial features.  The facial nerve is intact, with strong symmetric movements.  Neck-no palpable lymphadenopathy " or thyroid mass.  Trachea is midline.  Eyes - Extraocular movements intact.   Ears- External auditory canals are patent, tympanic membranes are intact without effusion or worrisome retractions   Nose - Nasal mucosa is pink and moist with no abnormal mucus.  The septum was grossly midline and non-obstructive, turbinates of normal size and position.  No polyps, masses, or purulence noted on examination.  Mouth - Examination of the oral cavity shows pink, healthy, moist mucosa.  No lesions or ulceration noted.  Edentulous. the tongue is mobile and midline.    Throat - Mcnamara Palate Position IV, microgenia.  Difficult visualization of the oropharynx.  Using a combination of direct exam and flexible oral exam I can see that the uvula is midline and the anterior tonsillar pillars are symmetric.  I cannot visualize the entire oropharynx.      Attempts at mirror laryngoscopy were not possible due to gag reflex.  Therefore I proceeded with a fiberoptic examination after informed consent.  First I applied topical nasal lidocaine and neosynephrine.  I then passed the scope through the nasal cavity.     The nasopharynx was mucosally covered and symmetric.  The eustachian tube openings were unobstructed.  Going further down I had a clear view of the base of tongue which had normal appearing grade 4 lingual tonsillar tissue.  The left vallecula has slightly more lingual tonsil tissue c/w left, no mass or ulceration. The base of tongue was free of lesions. The epiglottis was smooth and mucosally covered.  The supraglottic larynx was then clearly visualized.  The vocal cords moved smoothly and symmetrically and were without mass or nodules.  Vocal cord mobility was normal bilaterally with phonation and respiration.  The pyriform sinuses were open and without diya mass or pooling of secretions, and clear upon valsalva but limited exam pyriform due to intolerance.    The patient tolerated the procedure well but did have some  movement throughout.    Impression/Plan  Marly Cannon is a 59 year old female    ICD-10-CM    1. Microgenia  M26.06       2. Macroglossia  Q38.2       3. Hypertrophic soft palate  K13.79       4. Tobacco abuse counseling  Z71.6       5. Lingual tonsil hypertrophy  J35.1         No lingual tonsil mass  Microgenia is leading to relative lingual tonsil hypertrophy.    High risk of SCC upper airway due to tobacco abuse  High risk of ADEEL due to oropharyngeal anatomy    This was all discussed    Follow-up as needed for chronic pharyngitis unilateral otalgia that is not acute otitis, unexplained weight loss.  She would need a CT neck with contrast prior to follow-up. I messaged Amberly Whyte NP    Sleep study was recommended but declined she denies any heroic snoring or diya apnea.    Tobacco cessation was strongly encouraged.  The associated risk of head and neck cancer was discussed.  Every year of cessation offers health benefits. This was discussed with the patient today and they voiced understanding.  Quit tools and a nicotine patch were offered.              Radha Jaimes D.O.  Otolaryngology/Head and Neck Surgery  Allergy          Again, thank you for allowing me to participate in the care of your patient.        Sincerely,        Radha Jaimes MD

## 2024-11-26 DIAGNOSIS — I10 ESSENTIAL HYPERTENSION: ICD-10-CM

## 2024-11-26 RX ORDER — LISINOPRIL 40 MG/1
TABLET ORAL
Qty: 90 TABLET | Refills: 1 | Status: SHIPPED | OUTPATIENT
Start: 2024-11-26

## 2024-12-04 DIAGNOSIS — I10 ESSENTIAL HYPERTENSION: ICD-10-CM

## 2024-12-04 RX ORDER — ASPIRIN 81 MG/1
TABLET, CHEWABLE ORAL
Qty: 90 TABLET | Refills: 1 | Status: SHIPPED | OUTPATIENT
Start: 2024-12-04

## 2024-12-05 ENCOUNTER — ALLIED HEALTH/NURSE VISIT (OUTPATIENT)
Dept: EDUCATION SERVICES | Facility: OTHER | Age: 59
End: 2024-12-05
Attending: NURSE PRACTITIONER
Payer: COMMERCIAL

## 2024-12-05 VITALS
HEART RATE: 96 BPM | RESPIRATION RATE: 16 BRPM | HEIGHT: 64 IN | DIASTOLIC BLOOD PRESSURE: 83 MMHG | BODY MASS INDEX: 34.96 KG/M2 | OXYGEN SATURATION: 93 % | WEIGHT: 204.78 LBS | SYSTOLIC BLOOD PRESSURE: 135 MMHG

## 2024-12-05 DIAGNOSIS — E11.65 TYPE 2 DIABETES MELLITUS WITH HYPERGLYCEMIA, WITH LONG-TERM CURRENT USE OF INSULIN (H): Primary | ICD-10-CM

## 2024-12-05 DIAGNOSIS — Z79.4 TYPE 2 DIABETES MELLITUS WITH HYPERGLYCEMIA, WITH LONG-TERM CURRENT USE OF INSULIN (H): Primary | ICD-10-CM

## 2024-12-05 DIAGNOSIS — F17.200 NICOTINE DEPENDENCE, UNCOMPLICATED, UNSPECIFIED NICOTINE PRODUCT TYPE: ICD-10-CM

## 2024-12-05 LAB
CREAT UR-MCNC: 23.3 MG/DL
ERYTHROCYTE [DISTWIDTH] IN BLOOD BY AUTOMATED COUNT: 13.5 % (ref 10–15)
EST. AVERAGE GLUCOSE BLD GHB EST-MCNC: 229 MG/DL
HBA1C MFR BLD: 9.6 %
HCT VFR BLD AUTO: 50 % (ref 35–47)
HGB BLD-MCNC: 16.5 G/DL (ref 11.7–15.7)
MCH RBC QN AUTO: 30.2 PG (ref 26.5–33)
MCHC RBC AUTO-ENTMCNC: 33 G/DL (ref 31.5–36.5)
MCV RBC AUTO: 91 FL (ref 78–100)
MICROALBUMIN UR-MCNC: 90.4 MG/L
MICROALBUMIN/CREAT UR: 387.98 MG/G CR (ref 0–25)
PLATELET # BLD AUTO: 257 10E3/UL (ref 150–450)
RBC # BLD AUTO: 5.47 10E6/UL (ref 3.8–5.2)
WBC # BLD AUTO: 8 10E3/UL (ref 4–11)

## 2024-12-05 PROCEDURE — 82043 UR ALBUMIN QUANTITATIVE: CPT | Mod: ZL | Performed by: NURSE PRACTITIONER

## 2024-12-05 PROCEDURE — 85027 COMPLETE CBC AUTOMATED: CPT | Mod: ZL | Performed by: NURSE PRACTITIONER

## 2024-12-05 PROCEDURE — G0463 HOSPITAL OUTPT CLINIC VISIT: HCPCS

## 2024-12-05 PROCEDURE — 95251 CONT GLUC MNTR ANALYSIS I&R: CPT | Performed by: NURSE PRACTITIONER

## 2024-12-05 PROCEDURE — 82570 ASSAY OF URINE CREATININE: CPT | Mod: ZL | Performed by: NURSE PRACTITIONER

## 2024-12-05 PROCEDURE — 99213 OFFICE O/P EST LOW 20 MIN: CPT | Performed by: NURSE PRACTITIONER

## 2024-12-05 PROCEDURE — 83036 HEMOGLOBIN GLYCOSYLATED A1C: CPT | Mod: ZL | Performed by: NURSE PRACTITIONER

## 2024-12-05 PROCEDURE — 36415 COLL VENOUS BLD VENIPUNCTURE: CPT | Mod: ZL | Performed by: NURSE PRACTITIONER

## 2024-12-05 RX ORDER — KETOROLAC TROMETHAMINE 30 MG/ML
1 INJECTION, SOLUTION INTRAMUSCULAR; INTRAVENOUS ONCE
Qty: 1 EACH | Refills: 0 | Status: SHIPPED | OUTPATIENT
Start: 2024-12-05 | End: 2024-12-05

## 2024-12-05 RX ORDER — HYDROCHLOROTHIAZIDE 12.5 MG/1
CAPSULE ORAL
Qty: 6 EACH | Refills: 3 | Status: SHIPPED | OUTPATIENT
Start: 2024-12-05 | End: 2024-12-09 | Stop reason: ALTCHOICE

## 2024-12-05 ASSESSMENT — PAIN SCALES - GENERAL: PAINLEVEL_OUTOF10: NO PAIN (0)

## 2024-12-05 NOTE — PROGRESS NOTES
SUBJECTIVE:  Marly Cannon, 59 year old, female presents with the following Chief Complaint(s) with HPI to follow:  Chief Complaint   Patient presents with    Diabetes        Diabetes Follow-up  Patient is checking blood sugars: >4x/day using her personal CGM (Freestyle Bud).  Results:    Date: 11/22 to 12/5/24  Glucose management indicator: n/a    Average glucose: 200    Glucose range  Very low (<54): 0  low (<70): 0  In target range (): 51%  High (>180): 24%  Very high (>250): 25%    Symptoms of hypoglycemia (low blood sugar): none  Paresthesias (numbness or burning in feet) or sores: no, no sores  Diabetic eye exam within the last year: UTD  Breakfast eaten regularly: most of the time  Patient counting carbs: NO  Exercising: depends on the weather          HPI:  Marly's here today for the follow up regarding her Diabetes mellitus, Type 2.    A1c trend:  Lab Results   Component Value Date    A1C 9.7 05/16/2024    A1C 10.2 02/16/2024    A1C 11.0 11/15/2023    A1C 13.0 07/19/2023    A1C 15.1 04/19/2023    A1C >14.0 05/10/2021    A1C 10.7 02/10/2021    A1C 7.6 11/09/2020    A1C 7.9 08/07/2020    A1C 8.1 05/04/2020   Current Diabetes medication:   1. Omnipod dash, using Novolog  2. Jardiance 25 mg daily  Statin use: yes, simvastatin 20 mg daily  ASA: yes, 81 mg daily    Marly reports the following:  No issues with the Freestyle Bud   No issues with insulin pump    No new changes to her health    Denies any missed doses of her medication     Weight trend   Wt Readings from Last 4 Encounters:   12/05/24 92.9 kg (204 lb 12.5 oz)   11/25/24 91.4 kg (201 lb 8 oz)   10/30/24 91.4 kg (201 lb 9.6 oz)   08/01/24 90.4 kg (199 lb 4.7 oz)       Patient Active Problem List   Diagnosis    Type 2 diabetes, HbA1c goal < 7% (H)    ACP (advance care planning)    Stage 3 chronic kidney disease (H)    Pulmonary emphysema (H)    Hyperparathyroidism due to renal insufficiency (H)    Vitamin D deficiency    Intellectual  disability    Breast fibrocystic disorder    Tobacco abuse    Microalbuminuria due to type 2 diabetes mellitus (H)    H/O colonoscopy    Delmar cardiac risk <10% in next 10 years    Tubular adenoma of colon    Hyperlipidemia, unspecified hyperlipidemia type    Essential hypertension    Callus of foot    Memory disturbance    ADEEL (obstructive sleep apnea)    Tremor    Diabetic polyneuropathy associated with diabetes mellitus due to underlying condition (H)    Urinary incontinence, unspecified type       No past medical history on file.    Past Surgical History:   Procedure Laterality Date    BIOPSY BREAST Left 02/14/2008    patient states no bx, Clip in left breast/ stereo bxc 2-- no path in EPIC that far back    COLONOSCOPY N/A 08/20/2015    Procedure: COLONOSCOPY;  Surgeon: Gentry Porter MD;  Location: [facility]    COLONOSCOPY N/A 09/21/2018    Procedure: COLONOSCOPY;  COLONOSCOPY WITH POLYPECTOMY;  Surgeon: Gentry Porter MD;  Location: [facility]       Family History   Problem Relation Age of Onset    Diabetes Mother     Diabetes Father     Hypertension Brother     Diabetes Sister        Social History     Tobacco Use    Smoking status: Every Day     Current packs/day: 1.00     Average packs/day: 1 pack/day for 45.9 years (45.9 ttl pk-yrs)     Types: Cigarettes     Start date: 1/1/1979     Passive exposure: Current    Smokeless tobacco: Never    Tobacco comments:     Declined QP 05/13/2020   Substance Use Topics    Alcohol use: No     Alcohol/week: 0.0 standard drinks of alcohol       Current Outpatient Medications   Medication Sig Dispense Refill    Acetaminophen (TYLENOL PO) Take 325 mg by mouth every 6 hours as needed      amLODIPine (NORVASC) 5 MG tablet Take 1 tablet (5 mg) by mouth daily 90 tablet 3    aspirin (ASPIRIN LOW DOSE) 81 MG chewable tablet CHEW AND SWALLOW 1 TABLET BY MOUTH DAILY 90 tablet 1    atorvastatin (LIPITOR) 40 MG tablet TAKE 1 TABLET BY MOUTH AT BEDTIME 90 tablet 2     blood glucose (ONETOUCH ULTRA) test strip USE TO TEST BLOOD SUGAR 4 TIMES DAILY 100 strip 4    Cholecalciferol (VITAMIN D3) 50 MCG (2000 UT) CAPS TAKE 1 CAPSULE BY MOUTH DAILY 90 capsule 3    Continuous Glucose Sensor (FREESTYLE HANK 2 SENSOR) MISC CHANGE 1 SENSOR EVERY 14 DAYS, USE TO READ BLOOD SUGARS PER MANUFACTURERS INSTRUCTIONS 6 each 3    empagliflozin (JARDIANCE) 25 MG TABS tablet Take 1 tablet (25 mg) by mouth daily 90 tablet 3    insulin aspart (NOVOLOG VIAL) 100 UNITS/ML vial TO BE USED WITH INSULIN PUMP, MAX DAILY UNITS 70 30 mL 3    Insulin Disposable Pump (OMNIPOD DASH PODS, GEN 4,) MISC Inject 1 pod subcutaneously every 48 hours. 15 each 5    insulin glargine U-300 (TOUJEO SOLOSTAR) 300 UNIT/ML (1 units dial) pen Inject 20 Units Subcutaneous At Bedtime 9 mL 1    insulin pen needle (32G X 4 MM) 32G X 4 MM miscellaneous Use 1 pen needles daily 100 each 3    INSULIN PUMP - OUTPATIENT Insulin pump  Omnipod Dash  Date: 10/30/24  Basal: 1.2  Total basal: 28.8  CR:15  ISF: 50  BG target: 120  BG correction: 130  Active insulin: 4 hours      ipratropium-albuterol (COMBIVENT RESPIMAT)  MCG/ACT inhaler INHALE 1 PUFF INTO THE LUNGS 4 TIMES DAILY 4 g 2    ketoconazole (NIZORAL) 2 % external cream Apply topically daily 60 g 1    lisinopril (ZESTRIL) 40 MG tablet TAKE 1 TABLET BY MOUTH DAILY 90 tablet 1    OneTouch Delica Lancets 33G MISC Inject 1 each Subcutaneous 3 times daily (before meals) 100 each 11    order for DME Women briefs 50 each 1    primidone (MYSOLINE) 50 MG tablet TAKE 1 TABLET BY MOUTH AT BEDTIME 30 tablet 4    tiotropium-olodaterol 2.5-2.5 MCG/ACT AERS Inhale 2 puffs into the lungs daily         No Known Allergies    REVIEW OF SYSTEMS  Skin: hx of seborrheic dermatitis   Eyes: negative, wears glasses    Ears/Nose/Throat: negative  Respiratory: No shortness of breath or hemoptysis; smoker's cough; hx of sleep apnea  Cardiovascular: negative  Gastrointestinal: negative  Genitourinary:  "negative  Musculoskeletal: negative; hx of left shoulder and left knee pain  Neurologic: negative  Psychiatric: negative  Hematologic/Lymphatic/Immunologic: negative  Endocrine: positive for diabetes     OBJECTIVE:  /83   Pulse 96   Resp 16   Ht 1.613 m (5' 3.5\")   Wt 92.9 kg (204 lb 12.5 oz)   SpO2 93%   BMI 35.71 kg/m    Constitutional: alert and no distress  Respiratory:  Good diaphragmatic excursion.   Musculoskeletal: extremities normal- no gross deformities noted and gait normal  Skin: oily yellow flaky scales with pink base on forehead and nasal folds   Psychiatric: mentation appears normal and affect normal/bright      LABS  No results found for any visits on 12/05/24.    ASSESSMENT / PLAN:  (F17.200) Nicotine dependence, uncomplicated, unspecified nicotine product type  (primary encounter diagnosis)  Comment: noted, encouraged to quit  Plan:   Spoke with patient regarding options for smoking cessation.  Various pharmacologic options and behavioral techniques were reviewed.  The relative effectiveness as well as risks and benefits were reviewed with patient.  Patient is interested in: No interventions    Smoking Cessation    States she's down to less than 1 ppd  Let us know when you are ready    (E11.65,  Z79.4) Type 2 diabetes mellitus with hyperglycemia, with long-term current use of insulin (H)  Comment: noted insulin pump and CGM download          Insulin pump  Omnipod Dash  Date: 10/30/24  Basal: 1.2  Total basal: 28.8  CR:15  ISF: 50  BG target: 120  BG correction: 130  Active insulin: 4 hours    Plan: Insulin Disposable Pump (OMNIPOD DASH PODS, GEN        4,) MISC, GLUCOSE MONITOR, 72 HOUR, PHYS INTERP    Continued spikes of BGs in the morning after eating without any carb bolus entry  Education focused on this again  Demonstrated on how to do this again  Explained some is better than none    No insulin pump changes at this time    Due for an A1c  Will check it in December    Follow up  As " scheduled.      Call sooner if any issues    There are no Patient Instructions on file for this visit.Time: 30 minutes  Barrier: see [facility]  Willingness to learn: accepting    Courtney PINTO [facility]  Diabetes and Wound Care    Cc: Amberly Whyte NP    30 minutes was spent with patient.    All of this time was spent on counseling patient regarding illness, medication and/or treatment options, coordinating further cares and follow ups that are needed along with resource material that will be helpful in the treatment of these issues.       With the electronic record, we can now more quickly and easily track our patient diabetic goals. Our diabetes clinical review is in progress and these are the indicators we are monitoring for good diabetes health.     1.) HbA1C less than 7 (measurement of your average blood sugars)  2.) Blood Pressure less than 140/80  3.) LDL less than 100 (bad cholesterol)  4.) HbA1C is checked in the last 6 months and below 7% (more frequently if not at goal or adjusting medications)  5.) LDL is checked in the last 12 months (more frequently if not at goal or adjusting medications)  6.) Taking one baby aspirin daily (unless otherwise instructed)  7.) No tobacco use  8) Statin use     You have achieved 6 out of 8 of these and I am encouraging you to come in and get tuned up to achieve 8 out of 8!  Here is what you have achieved so far in my goals for you:  1.) HbA1C  less than 7:                              NO  Your last  HbA1C :  Lab Results   Component Value Date    A1C 9.7 05/16/2024    A1C 10.2 02/16/2024    A1C 11.0 11/15/2023    A1C 13.0 07/19/2023    A1C 15.1 04/19/2023    A1C >14.0 05/10/2021    A1C 10.7 02/10/2021    A1C 7.6 11/09/2020    A1C 7.9 08/07/2020    A1C 8.1 05/04/2020     2.) Blood Pressure less than 140/80:       YES     Your last                       BP Readings from Last 1 Encounters:   12/05/24 135/83     3.) LDL less than 100:                              YES      Your  last     LDL Cholesterol Calculated   Date Value Ref Range Status   05/16/2024 89 <=100 mg/dL Final   11/09/2020 74 <100 mg/dL Final     Comment:     Desirable:       <100 mg/dl       4.) Checked HbA1C in the past 6 months: YES      5.) Checked LDL in the past 12 months:    YES      6.) Taking one aspirin daily:                       YES     7.) No tobacco use:                                        NO   8.) Statin use      YES       CGM criteria   Patient has been seen in the clinic within the last 6 month  Diagnosis of Type 2 diabetes  Has been testing their BGs 4x/day using personal CGM  Uses an insulin pump   Requires frequent adjustments to their treatment regimen based on BGM and/or CGM testing results.                        YES     7.) No tobacco use:                                        NO   8.) Statin use      YES       CGM criteria   Patient has been seen in the clinic within the last 6 month  Diagnosis of Type 2 diabetes  Has been testing their BGs 4x/day using personal CGM  Uses an insulin pump   Requires frequent adjustments to their treatment regimen based on BGM and/or CGM testing results.

## 2024-12-06 NOTE — PATIENT INSTRUCTIONS
Continue working on healthy eating and moving (start low and slow, work up to 30 min, 5x/week)    BG goals:  Fasting and before meals <130, >70  2 hour after eating <180    We only need 1/2 of these numbers to be within target then your A1c will be within target    Medication changes   None today    Keep working on entering carb bolus every time you consume carbs    Follow up   As discussed    Call me sooner if any problems/concerns and/or questions develop including consistent low BGs <70 or consistent high BGs >200  [phone] (Unit Coordinator)    [phone] (Anna, Nurse)    Smoking:   Keep working on quitting    Please keep your schedule appointment with Amberly BARNES NP

## 2024-12-09 DIAGNOSIS — E11.65 TYPE 2 DIABETES MELLITUS WITH HYPERGLYCEMIA, WITH LONG-TERM CURRENT USE OF INSULIN (H): Primary | ICD-10-CM

## 2024-12-09 DIAGNOSIS — Z79.4 TYPE 2 DIABETES MELLITUS WITH HYPERGLYCEMIA, WITH LONG-TERM CURRENT USE OF INSULIN (H): Primary | ICD-10-CM

## 2024-12-09 RX ORDER — ACYCLOVIR 400 MG/1
1 TABLET ORAL ONCE
Qty: 1 EACH | Refills: 0 | Status: SHIPPED | OUTPATIENT
Start: 2024-12-09 | End: 2024-12-09

## 2024-12-09 RX ORDER — ACYCLOVIR 400 MG/1
1 TABLET ORAL
Qty: 9 EACH | Refills: 5 | Status: SHIPPED | OUTPATIENT
Start: 2024-12-09

## 2024-12-09 NOTE — PROGRESS NOTES
Home nurse messaged asking if Marly could be switched to the Dexcom G7    Order sent    Courtney PINTO FNP-BC  Diabetes and Wound Care

## 2024-12-11 ENCOUNTER — TELEPHONE (OUTPATIENT)
Dept: EDUCATION SERVICES | Facility: OTHER | Age: 59
End: 2024-12-11

## 2024-12-11 NOTE — TELEPHONE ENCOUNTER
10:10 AM    Reason for Call: Phone Call    Description: Patient is calling stating she got a new machine. Patient is wondering if Courtney Chaudhary needs to have her to come in to set it up.    Was an appointment offered for this call? No  If yes : Appointment type              Date    Preferred method for responding to this message: Telephone Call  What is your phone number ?  [phone]    If we cannot reach you directly, may we leave a detailed response at the number you provided? Yes    Can this message wait until your PCP/provider returns, if available today? YES, Provider is    Shannon Lopez

## 2024-12-11 NOTE — TELEPHONE ENCOUNTER
I called the pt and notified. Pt is comfortable with this and will call if she has any questions.

## 2025-01-14 DIAGNOSIS — Z53.9 PERSONS ENCOUNTERING HEALTH SERVICES FOR SPECIFIC PROCEDURES, NOT CARRIED OUT: Primary | ICD-10-CM

## 2025-01-14 PROCEDURE — G0179 MD RECERTIFICATION HHA PT: HCPCS | Performed by: NURSE PRACTITIONER

## 2025-01-18 ENCOUNTER — MEDICAL CORRESPONDENCE (OUTPATIENT)
Dept: HEALTH INFORMATION MANAGEMENT | Facility: CLINIC | Age: 60
End: 2025-01-18
Payer: COMMERCIAL

## 2025-01-23 ENCOUNTER — ALLIED HEALTH/NURSE VISIT (OUTPATIENT)
Dept: EDUCATION SERVICES | Facility: OTHER | Age: 60
End: 2025-01-23
Attending: NURSE PRACTITIONER
Payer: COMMERCIAL

## 2025-01-23 VITALS
SYSTOLIC BLOOD PRESSURE: 100 MMHG | WEIGHT: 204 LBS | RESPIRATION RATE: 18 BRPM | BODY MASS INDEX: 34.83 KG/M2 | OXYGEN SATURATION: 93 % | HEART RATE: 101 BPM | DIASTOLIC BLOOD PRESSURE: 66 MMHG | HEIGHT: 64 IN

## 2025-01-23 DIAGNOSIS — E11.65 TYPE 2 DIABETES MELLITUS WITH HYPERGLYCEMIA, WITH LONG-TERM CURRENT USE OF INSULIN (H): Primary | ICD-10-CM

## 2025-01-23 DIAGNOSIS — Z79.4 TYPE 2 DIABETES MELLITUS WITH HYPERGLYCEMIA, WITH LONG-TERM CURRENT USE OF INSULIN (H): Primary | ICD-10-CM

## 2025-01-23 DIAGNOSIS — F17.200 NICOTINE DEPENDENCE, UNCOMPLICATED, UNSPECIFIED NICOTINE PRODUCT TYPE: ICD-10-CM

## 2025-01-23 PROCEDURE — 99213 OFFICE O/P EST LOW 20 MIN: CPT | Performed by: NURSE PRACTITIONER

## 2025-01-23 PROCEDURE — 95251 CONT GLUC MNTR ANALYSIS I&R: CPT | Performed by: NURSE PRACTITIONER

## 2025-01-23 PROCEDURE — G0463 HOSPITAL OUTPT CLINIC VISIT: HCPCS

## 2025-01-23 ASSESSMENT — PAIN SCALES - GENERAL: PAINLEVEL_OUTOF10: NO PAIN (0)

## 2025-01-23 NOTE — PATIENT INSTRUCTIONS
Continue working on healthy eating and moving (start low and slow, work up to 30 min, 5x/week)    BG goals:  Fasting and before meals <130, >70  2 hour after eating <180    We only need 1/2 of these numbers to be within target then your A1c will be within target    Medication changes   None today    Keep working on entering carb bolus every time you consume carbs    Follow up   As discussed    Call me sooner if any problems/concerns and/or questions develop including consistent low BGs <70 or consistent high BGs >200  [phone] (Unit Coordinator)    [phone] (Anna, Nurse)    Smoking:   Keep working on quitting    Please keep your schedule appointment with Amberly BARNES NP

## 2025-01-23 NOTE — PROGRESS NOTES
SUBJECTIVE:  Marly Cannon, 59 year old, female presents with the following Chief Complaint(s) with HPI to follow:  Chief Complaint   Patient presents with    Diabetes Education     Follow up, help with Bud.        Diabetes Follow-up  Patient is checking blood sugars: >4x/day using her personal CGM (Freestyle Bud 2).  Results:    Date: 11/22 to 12/5/24  Glucose management indicator: n/a    Average glucose: 200    Glucose range  Very low (<54): 0  low (<70): 0  In target range (): 51%  High (>180): 24%  Very high (>250): 25%    Symptoms of hypoglycemia (low blood sugar): none  Paresthesias (numbness or burning in feet) or sores: no, no sores  Diabetic eye exam within the last year: UTD  Breakfast eaten regularly: most of the time  Patient counting carbs: NO  Exercising: depends on the weather          HPI:  Marly's here today for the follow up regarding her Diabetes mellitus, Type 2.    A1c trend:  Lab Results   Component Value Date    A1C 9.7 05/16/2024    A1C 10.2 02/16/2024    A1C 11.0 11/15/2023    A1C 13.0 07/19/2023    A1C 15.1 04/19/2023    A1C >14.0 05/10/2021    A1C 10.7 02/10/2021    A1C 7.6 11/09/2020    A1C 7.9 08/07/2020    A1C 8.1 05/04/2020   Current Diabetes medication:   1. Omnipod dash, using Novolog  2. Jardiance 25 mg daily  Statin use: yes, simvastatin 20 mg daily  ASA: yes, 81 mg daily    Marly reports the following:  No issues with the Freestyle Bud 2  No issues with insulin pump    No new changes to her health    Denies any missed doses of her medication     Weight trend   Wt Readings from Last 4 Encounters:   01/23/25 92.5 kg (204 lb)   12/05/24 92.9 kg (204 lb 12.5 oz)   11/25/24 91.4 kg (201 lb 8 oz)   10/30/24 91.4 kg (201 lb 9.6 oz)       Patient Active Problem List   Diagnosis    Type 2 diabetes, HbA1c goal < 7% (H)    ACP (advance care planning)    Stage 3 chronic kidney disease (H)    Pulmonary emphysema (H)    Hyperparathyroidism due to renal insufficiency    Vitamin  D deficiency    Intellectual disability    Breast fibrocystic disorder    Tobacco abuse    Microalbuminuria due to type 2 diabetes mellitus (H)    H/O colonoscopy    [place] cardiac risk <10% in next 10 years    Tubular adenoma of colon    Hyperlipidemia, unspecified hyperlipidemia type    Essential hypertension    Callus of foot    Memory disturbance    ADEEL (obstructive sleep apnea)    Tremor    Diabetic polyneuropathy associated with diabetes mellitus due to underlying condition (H)    Urinary incontinence, unspecified type       No past medical history on file.    Past Surgical History:   Procedure Laterality Date    BIOPSY BREAST Left 02/14/2008    patient states no bx, Clip in left breast/ stereo bxc 2-- no path in EPIC that far back    COLONOSCOPY N/A 08/20/2015    Procedure: COLONOSCOPY;  Surgeon: Gentry Porter MD;  Location: [facility]    COLONOSCOPY N/A 09/21/2018    Procedure: COLONOSCOPY;  COLONOSCOPY WITH POLYPECTOMY;  Surgeon: Gentry Porter MD;  Location: [facility]       Family History   Problem Relation Age of Onset    Diabetes Mother     Diabetes Father     Hypertension Brother     Diabetes Sister        Social History     Tobacco Use    Smoking status: Every Day     Current packs/day: 1.00     Average packs/day: 1 pack/day for 46.1 years (46.1 ttl pk-yrs)     Types: Cigarettes     Start date: 1/1/1979     Passive exposure: Current    Smokeless tobacco: Never    Tobacco comments:     Declined QP 05/13/2020   Substance Use Topics    Alcohol use: No     Alcohol/week: 0.0 standard drinks of alcohol       Current Outpatient Medications   Medication Sig Dispense Refill    Acetaminophen (TYLENOL PO) Take 325 mg by mouth every 6 hours as needed      amLODIPine (NORVASC) 5 MG tablet Take 1 tablet (5 mg) by mouth daily 90 tablet 3    aspirin (ASPIRIN LOW DOSE) 81 MG chewable tablet CHEW AND SWALLOW 1 TABLET BY MOUTH DAILY 90 tablet 1    atorvastatin (LIPITOR) 40 MG tablet TAKE 1 TABLET BY MOUTH  AT BEDTIME 90 tablet 2    blood glucose (ONETOUCH ULTRA) test strip USE TO TEST BLOOD SUGAR 4 TIMES DAILY 100 strip 4    Cholecalciferol (VITAMIN D3) 50 MCG (2000 UT) CAPS TAKE 1 CAPSULE BY MOUTH DAILY 90 capsule 3    Continuous Glucose Sensor (DEXCOM G7 SENSOR) MISC 1 each every 10 days. Change sensor every 10 days 9 each 5    empagliflozin (JARDIANCE) 25 MG TABS tablet Take 1 tablet (25 mg) by mouth daily 90 tablet 3    insulin aspart (NOVOLOG VIAL) 100 UNITS/ML vial TO BE USED WITH INSULIN PUMP, MAX DAILY UNITS 70 30 mL 3    Insulin Disposable Pump (OMNIPOD DASH PODS, GEN 4,) MISC Inject 1 pod subcutaneously every 48 hours. 15 each 5    insulin glargine U-300 (TOUJEO SOLOSTAR) 300 UNIT/ML (1 units dial) pen Inject 20 Units Subcutaneous At Bedtime 9 mL 1    insulin pen needle (32G X 4 MM) 32G X 4 MM miscellaneous Use 1 pen needles daily 100 each 3    INSULIN PUMP - OUTPATIENT Insulin pump  Omnipod Dash  Date: 12/5/24  Basal: 1.2  Total basal: 28.8  CR:15  ISF: 50  BG target: 120  BG correction: 130  Active insulin: 4 hours      ipratropium-albuterol (COMBIVENT RESPIMAT)  MCG/ACT inhaler INHALE 1 PUFF INTO THE LUNGS 4 TIMES DAILY 4 g 2    ketoconazole (NIZORAL) 2 % external cream Apply topically daily 60 g 1    lisinopril (ZESTRIL) 40 MG tablet TAKE 1 TABLET BY MOUTH DAILY 90 tablet 1    OneTouch Delica Lancets 33G MISC Inject 1 each Subcutaneous 3 times daily (before meals) 100 each 11    order for DME Women briefs 50 each 1    primidone (MYSOLINE) 50 MG tablet TAKE 1 TABLET BY MOUTH AT BEDTIME 30 tablet 4    tiotropium-olodaterol 2.5-2.5 MCG/ACT AERS Inhale 2 puffs into the lungs daily         No Known Allergies    REVIEW OF SYSTEMS  Skin: hx of seborrheic dermatitis   Eyes: negative, wears glasses    Ears/Nose/Throat: negative  Respiratory: No shortness of breath or hemoptysis; smoker's cough; hx of sleep apnea  Cardiovascular: negative  Gastrointestinal: negative  Genitourinary: negative  Musculoskeletal:  "negative; hx of left shoulder and left knee pain  Neurologic: negative  Psychiatric: negative  Hematologic/Lymphatic/Immunologic: negative  Endocrine: positive for diabetes     OBJECTIVE:  /66 (BP Location: Left arm, Patient Position: Sitting, Cuff Size: Adult Regular)   Pulse 101   Resp 18   Ht 1.626 m (5' 4\")   Wt 92.5 kg (204 lb)   SpO2 93%   BMI 35.02 kg/m    Constitutional: alert and no distress  Respiratory:  Good diaphragmatic excursion.   Musculoskeletal: extremities normal- no gross deformities noted and gait normal  Skin: oily yellow flaky scales with pink base on forehead and nasal folds   Psychiatric: mentation appears normal and affect normal/bright      LABS  No results found for any visits on 01/23/25.      ASSESSMENT / PLAN:  (E11.65,  Z79.4) Type 2 diabetes mellitus with hyperglycemia, with long-term current use of insulin (H)  (primary encounter diagnosis)  Comment: noted insulin pump and CGM download          Insulin pump  Omnipod Dash  Date: 12/5/24  Basal: 1.2  Total basal: 28.8  CR:15  ISF: 50  BG target: 120  BG correction: 130  Active insulin: 4 hours    Plan: Hemoglobin A1c, Albumin Random Urine         Quantitative with Creat Ratio, CBC with         platelets, Continuous Glucose          (FREESTYLE HANK 3 READER) AISHA, GLUCOSE         MONITOR, 72 HOUR, PHYS INTERP, DISCONTINUED:         Continuous Glucose Sensor (FREESTYLE HANK 3         PLUS SENSOR) MISC          Continued spikes of BGs in the morning after eating without any carb bolus entry  Education focused on this again  Demonstrated on how to do this again  Explained some is better than none    No insulin pump changes at this time  Keep working on the timing of your carb bolus    Discussed the newer Freestyle Hank 3  She agreed  Order sent    (F17.200) Nicotine dependence, uncomplicated, unspecified nicotine product type  Comment: noted, refused  Plan:   Spoke with patient regarding options for smoking " cessation.  Various pharmacologic options and behavioral techniques were reviewed.  The relative effectiveness as well as risks and benefits were reviewed with patient.  Patient is interested in: No interventions    Smoking Cessation    States she's down to less than 1 ppd  Let us know when you are ready    Follow up  As scheduled.      Call sooner if any issues    There are no Patient Instructions on file for this visit.Time: 25 minutes  Barrier: see PMH  Willingness to learn: accepting    Courtney PINTO [facility]  Diabetes and Wound Care    Cc: Amberly Whyte NP    With the electronic record, we can now more quickly and easily track our patient diabetic goals. Our diabetes clinical review is in progress and these are the indicators we are monitoring for good diabetes health.     1.) HbA1C less than 7 (measurement of your average blood sugars)  2.) Blood Pressure less than 140/80  3.) LDL less than 100 (bad cholesterol)  4.) HbA1C is checked in the last 6 months and below 7% (more frequently if not at goal or adjusting medications)  5.) LDL is checked in the last 12 months (more frequently if not at goal or adjusting medications)  6.) Taking one baby aspirin daily (unless otherwise instructed)  7.) No tobacco use  8) Statin use     You have achieved 6 out of 8 of these and I am encouraging you to come in and get tuned up to achieve 8 out of 8!  Here is what you have achieved so far in my goals for you:  1.) HbA1C  less than 7:                              NO  Your last  HbA1C :  Lab Results   Component Value Date    A1C 9.6 12/05/2024    A1C 9.7 05/16/2024    A1C 10.2 02/16/2024    A1C 11.0 11/15/2023    A1C 13.0 07/19/2023    A1C >14.0 05/10/2021    A1C 10.7 02/10/2021    A1C 7.6 11/09/2020    A1C 7.9 08/07/2020    A1C 8.1 05/04/2020     2.) Blood Pressure less than 140/80:       YES     Your last                       BP Readings from Last 1 Encounters:   01/23/25 100/66     3.) LDL less than 100:                               YES      Your last     LDL Cholesterol Calculated   Date Value Ref Range Status   05/16/2024 89 <=100 mg/dL Final   11/09/2020 74 <100 mg/dL Final     Comment:     Desirable:       <100 mg/dl       4.) Checked HbA1C in the past 6 months: YES      5.) Checked LDL in the past 12 months:    YES      6.) Taking one aspirin daily:                       YES     7.) No tobacco use:                                        NO   8.) Statin use      YES       CGM criteria   Patient has been seen in the clinic within the last 6 month  Diagnosis of Type 2 diabetes  Has been testing their BGs 4x/day using personal CGM  Uses an insulin pump   Requires frequent adjustments to their treatment regimen based on BGM and/or CGM testing results.                 personal CGM  Uses an insulin pump   Requires frequent adjustments to their treatment regimen based on BGM and/or CGM testing results.

## 2025-01-28 DIAGNOSIS — E55.9 VITAMIN D DEFICIENCY: ICD-10-CM

## 2025-01-28 RX ORDER — ACETAMINOPHEN 160 MG
TABLET,DISINTEGRATING ORAL
Qty: 90 CAPSULE | Refills: 0 | Status: SHIPPED | OUTPATIENT
Start: 2025-01-28

## 2025-01-28 NOTE — TELEPHONE ENCOUNTER
Cholecalciferol (Vitamin D3) 50 MCG (2000 UT) caps     Last Written Prescription Date:  01/30/2024  Last Fill Quantity: 90,   # refills: 0  Last Office Visit: 05/16/2024  Future Office visit:    Next 5 appointments (look out 90 days)      Mar 25, 2025 2:00 PM  (Arrive by 1:45 PM)  Provider Visit with Amberly Whyte NP  [facility] ([facility] [place] ) [address]  [place] [address]  [phone]             Routing refill request to provider for review/approval because:

## 2025-02-02 RX ORDER — BLOOD-GLUCOSE SENSOR
1 EACH MISCELLANEOUS
Qty: 2 EACH | Refills: 5 | Status: SHIPPED | OUTPATIENT
Start: 2025-02-02

## 2025-02-02 RX ORDER — INSULIN PMP CART,AUT,G6/7,CNTR
1 EACH SUBCUTANEOUS
Qty: 10 EACH | Refills: 5 | Status: SHIPPED | OUTPATIENT
Start: 2025-02-02

## 2025-02-02 RX ORDER — INSULIN PMP CART,AUT,G6/7,CNTR
1 EACH SUBCUTANEOUS
Qty: 1 KIT | Refills: 0 | Status: SHIPPED | OUTPATIENT
Start: 2025-02-02

## 2025-02-05 ENCOUNTER — MEDICAL CORRESPONDENCE (OUTPATIENT)
Dept: HEALTH INFORMATION MANAGEMENT | Facility: HOSPITAL | Age: 60
End: 2025-02-05

## 2025-02-19 ENCOUNTER — TELEPHONE (OUTPATIENT)
Dept: EDUCATION SERVICES | Facility: OTHER | Age: 60
End: 2025-02-19

## 2025-02-19 DIAGNOSIS — E11.65 TYPE 2 DIABETES MELLITUS WITH HYPERGLYCEMIA, WITH LONG-TERM CURRENT USE OF INSULIN (H): ICD-10-CM

## 2025-02-19 DIAGNOSIS — Z79.4 TYPE 2 DIABETES MELLITUS WITH HYPERGLYCEMIA, WITH LONG-TERM CURRENT USE OF INSULIN (H): ICD-10-CM

## 2025-02-19 NOTE — TELEPHONE ENCOUNTER
I called the pt to notify of [employer] resources. Pt is interested in this. We will get pt enrolled and she will watch for phone call. Pt has received her new Omnipod and needs help getting it registered.

## 2025-02-19 NOTE — TELEPHONE ENCOUNTER
Novolog  Last Written Prescription Date: 6/27/24  Last Fill Quantity: 30 ml # of Refills: 3  Last Office Visit: 1/23/25

## 2025-02-20 NOTE — TELEPHONE ENCOUNTER
I called the pt Courtney Chaudhary she can help her register her Omnipod device which will make the Omnipod company to contact her to to arrange training. Pt will come next week 02/26/25 at 1:30pm for help.

## 2025-02-24 ENCOUNTER — PATIENT OUTREACH (OUTPATIENT)
Dept: CARE COORDINATION | Facility: CLINIC | Age: 60
End: 2025-02-24
Payer: COMMERCIAL

## 2025-02-24 RX ORDER — INSULIN ASPART 100 [IU]/ML
INJECTION, SOLUTION INTRAVENOUS; SUBCUTANEOUS
Qty: 30 ML | Refills: 3 | Status: SHIPPED | OUTPATIENT
Start: 2025-02-24

## 2025-02-24 NOTE — PROGRESS NOTES
Food Resource Navigator Contact      Clinical Data: Food Resource Navigator Outreach    Called today to discuss potential of enrolling in [employer] program through [place] [employer] gene. Marly is not eligible due to age, however, partner - Tavo would be. FRN sending application in the mail for Tavo to complete and send back to [employer] [place].     FRN will do no further outreaches at this time.      Janey Manzano   [facility] Food Resource Navigator  [employer]   [phone]

## 2025-02-26 ENCOUNTER — ALLIED HEALTH/NURSE VISIT (OUTPATIENT)
Dept: EDUCATION SERVICES | Facility: OTHER | Age: 60
End: 2025-02-26
Attending: NURSE PRACTITIONER
Payer: COMMERCIAL

## 2025-02-26 DIAGNOSIS — Z79.4 TYPE 2 DIABETES MELLITUS WITH HYPERGLYCEMIA, WITH LONG-TERM CURRENT USE OF INSULIN (H): Primary | ICD-10-CM

## 2025-02-26 DIAGNOSIS — E11.65 TYPE 2 DIABETES MELLITUS WITH HYPERGLYCEMIA, WITH LONG-TERM CURRENT USE OF INSULIN (H): Primary | ICD-10-CM

## 2025-02-26 NOTE — PROGRESS NOTES
Carmita Luke set up her Omnipod account    Email: [email or web address]  Password: [email or web address]    Omnipod ID: [email or web address]  Password: [email or web address]!    Will reach out to Renea for training    Courtney PINTO [facility]  Diabetes and Wound Care

## 2025-03-11 ENCOUNTER — ANCILLARY PROCEDURE (OUTPATIENT)
Dept: MAMMOGRAPHY | Facility: OTHER | Age: 60
End: 2025-03-11
Attending: NURSE PRACTITIONER
Payer: COMMERCIAL

## 2025-03-11 DIAGNOSIS — Z12.31 VISIT FOR SCREENING MAMMOGRAM: ICD-10-CM

## 2025-03-11 PROCEDURE — 77063 BREAST TOMOSYNTHESIS BI: CPT | Mod: TC

## 2025-03-12 ENCOUNTER — TELEPHONE (OUTPATIENT)
Dept: MAMMOGRAPHY | Facility: HOSPITAL | Age: 60
End: 2025-03-12

## 2025-03-12 DIAGNOSIS — G25.0 ESSENTIAL TREMOR: ICD-10-CM

## 2025-03-12 RX ORDER — PRIMIDONE 50 MG/1
50 TABLET ORAL AT BEDTIME
Qty: 30 TABLET | Refills: 0 | Status: SHIPPED | OUTPATIENT
Start: 2025-03-12

## 2025-03-17 ENCOUNTER — APPOINTMENT (OUTPATIENT)
Dept: GENERAL RADIOLOGY | Facility: HOSPITAL | Age: 60
End: 2025-03-17
Attending: EMERGENCY MEDICINE
Payer: COMMERCIAL

## 2025-03-17 ENCOUNTER — HOSPITAL ENCOUNTER (EMERGENCY)
Facility: HOSPITAL | Age: 60
Discharge: HOME | End: 2025-03-18
Attending: EMERGENCY MEDICINE
Payer: COMMERCIAL

## 2025-03-17 DIAGNOSIS — R42 DIZZINESS: ICD-10-CM

## 2025-03-17 LAB
ALBUMIN SERPL BCG-MCNC: 4.4 G/DL (ref 3.5–5.2)
ALBUMIN UR-MCNC: 20 MG/DL
ALP SERPL-CCNC: 147 U/L (ref 40–150)
ALT SERPL W P-5'-P-CCNC: 13 U/L (ref 0–50)
ANION GAP SERPL CALCULATED.3IONS-SCNC: 11 MMOL/L (ref 7–15)
ANION GAP SERPL CALCULATED.3IONS-SCNC: 18 MMOL/L (ref 7–15)
APPEARANCE UR: CLEAR
AST SERPL W P-5'-P-CCNC: 17 U/L (ref 0–45)
B-OH-BUTYR SERPL-SCNC: 0.27 MMOL/L
BASE EXCESS BLDV CALC-SCNC: 3.2 MMOL/L (ref -3–3)
BASOPHILS # BLD AUTO: 0.1 10E3/UL (ref 0–0.2)
BASOPHILS # BLD AUTO: 0.1 10E3/UL (ref 0–0.2)
BASOPHILS NFR BLD AUTO: 0 %
BASOPHILS NFR BLD AUTO: 1 %
BILIRUB DIRECT SERPL-MCNC: 0.22 MG/DL (ref 0–0.3)
BILIRUB SERPL-MCNC: 0.8 MG/DL
BILIRUB UR QL STRIP: NEGATIVE
BUN SERPL-MCNC: 21.9 MG/DL (ref 8–23)
BUN SERPL-MCNC: 22.3 MG/DL (ref 8–23)
CA-I BLD-MCNC: 4.6 MG/DL (ref 4.4–5.2)
CALCIUM SERPL-MCNC: 10.9 MG/DL (ref 8.8–10.4)
CALCIUM SERPL-MCNC: 9 MG/DL (ref 8.8–10.4)
CHLORIDE SERPL-SCNC: 87 MMOL/L (ref 98–107)
CHLORIDE SERPL-SCNC: 95 MMOL/L (ref 98–107)
COLOR UR AUTO: ABNORMAL
CREAT SERPL-MCNC: 1.34 MG/DL (ref 0.51–0.95)
CREAT SERPL-MCNC: 1.41 MG/DL (ref 0.51–0.95)
EGFRCR SERPLBLD CKD-EPI 2021: 42 ML/MIN/1.73M2
EGFRCR SERPLBLD CKD-EPI 2021: 45 ML/MIN/1.73M2
EOSINOPHIL # BLD AUTO: 0 10E3/UL (ref 0–0.7)
EOSINOPHIL # BLD AUTO: 0.1 10E3/UL (ref 0–0.7)
EOSINOPHIL NFR BLD AUTO: 0 %
EOSINOPHIL NFR BLD AUTO: 0 %
ERYTHROCYTE [DISTWIDTH] IN BLOOD BY AUTOMATED COUNT: 14 % (ref 10–15)
ERYTHROCYTE [DISTWIDTH] IN BLOOD BY AUTOMATED COUNT: 14.6 % (ref 10–15)
FLUAV RNA SPEC QL NAA+PROBE: NEGATIVE
FLUBV RNA RESP QL NAA+PROBE: NEGATIVE
GLUCOSE SERPL-MCNC: 542 MG/DL (ref 70–99)
GLUCOSE SERPL-MCNC: 562 MG/DL (ref 70–99)
GLUCOSE UR STRIP-MCNC: >1000 MG/DL
HCO3 BLDV-SCNC: 29 MMOL/L (ref 21–28)
HCO3 SERPL-SCNC: 26 MMOL/L (ref 22–29)
HCO3 SERPL-SCNC: 27 MMOL/L (ref 22–29)
HCT VFR BLD AUTO: 53 % (ref 35–47)
HCT VFR BLD AUTO: 59 % (ref 35–47)
HGB BLD-MCNC: 17.8 G/DL (ref 11.7–15.7)
HGB BLD-MCNC: 19.8 G/DL (ref 11.7–15.7)
HGB UR QL STRIP: NEGATIVE
HOLD SPECIMEN: NORMAL
IMM GRANULOCYTES # BLD: 0.1 10E3/UL
IMM GRANULOCYTES # BLD: 0.1 10E3/UL
IMM GRANULOCYTES NFR BLD: 1 %
IMM GRANULOCYTES NFR BLD: 1 %
KETONES UR STRIP-MCNC: NEGATIVE MG/DL
LEUKOCYTE ESTERASE UR QL STRIP: NEGATIVE
LYMPHOCYTES # BLD AUTO: 0.6 10E3/UL (ref 0.8–5.3)
LYMPHOCYTES # BLD AUTO: 1 10E3/UL (ref 0.8–5.3)
LYMPHOCYTES NFR BLD AUTO: 4 %
LYMPHOCYTES NFR BLD AUTO: 6 %
MCH RBC QN AUTO: 30.9 PG (ref 26.5–33)
MCH RBC QN AUTO: 31 PG (ref 26.5–33)
MCHC RBC AUTO-ENTMCNC: 33.6 G/DL (ref 31.5–36.5)
MCHC RBC AUTO-ENTMCNC: 33.6 G/DL (ref 31.5–36.5)
MCV RBC AUTO: 92 FL (ref 78–100)
MCV RBC AUTO: 92 FL (ref 78–100)
MONOCYTES # BLD AUTO: 1 10E3/UL (ref 0–1.3)
MONOCYTES # BLD AUTO: 1.3 10E3/UL (ref 0–1.3)
MONOCYTES NFR BLD AUTO: 6 %
MONOCYTES NFR BLD AUTO: 8 %
NEUTROPHILS # BLD AUTO: 14.4 10E3/UL (ref 1.6–8.3)
NEUTROPHILS # BLD AUTO: 14.5 10E3/UL (ref 1.6–8.3)
NEUTROPHILS NFR BLD AUTO: 86 %
NEUTROPHILS NFR BLD AUTO: 87 %
NITRATE UR QL: NEGATIVE
NRBC # BLD AUTO: 0 10E3/UL
NRBC # BLD AUTO: 0 10E3/UL
NRBC BLD AUTO-RTO: 0 /100
NRBC BLD AUTO-RTO: 0 /100
O2/TOTAL GAS SETTING VFR VENT: 24 %
OXYHGB MFR BLDV: 73 % (ref 70–75)
PCO2 BLDV: 45 MM HG (ref 40–50)
PH BLDV: 7.42 [PH] (ref 7.32–7.43)
PH UR STRIP: 5 [PH] (ref 4.7–8)
PLATELET # BLD AUTO: 205 10E3/UL (ref 150–450)
PLATELET # BLD AUTO: 277 10E3/UL (ref 150–450)
PO2 BLDV: 39 MM HG (ref 25–47)
POTASSIUM SERPL-SCNC: 5.1 MMOL/L (ref 3.4–5.3)
POTASSIUM SERPL-SCNC: 5.2 MMOL/L (ref 3.4–5.3)
PROCALCITONIN SERPL IA-MCNC: 0.11 NG/ML
PROT SERPL-MCNC: 9 G/DL (ref 6.4–8.3)
RBC # BLD AUTO: 5.76 10E6/UL (ref 3.8–5.2)
RBC # BLD AUTO: 6.39 10E6/UL (ref 3.8–5.2)
RBC URINE: <1 /HPF
RSV RNA SPEC NAA+PROBE: NEGATIVE
SAO2 % BLDV: 76.6 % (ref 70–75)
SARS-COV-2 RNA RESP QL NAA+PROBE: NEGATIVE
SODIUM SERPL-SCNC: 132 MMOL/L (ref 135–145)
SODIUM SERPL-SCNC: 132 MMOL/L (ref 135–145)
SP GR UR STRIP: 1.03 (ref 1–1.03)
SQUAMOUS EPITHELIAL: 2 /HPF
UROBILINOGEN UR STRIP-MCNC: NORMAL MG/DL
WBC # BLD AUTO: 16.7 10E3/UL (ref 4–11)
WBC # BLD AUTO: 16.7 10E3/UL (ref 4–11)
WBC URINE: 10 /HPF

## 2025-03-17 PROCEDURE — 82805 BLOOD GASES W/O2 SATURATION: CPT | Performed by: EMERGENCY MEDICINE

## 2025-03-17 PROCEDURE — 99284 EMERGENCY DEPT VISIT MOD MDM: CPT | Performed by: EMERGENCY MEDICINE

## 2025-03-17 PROCEDURE — 87637 SARSCOV2&INF A&B&RSV AMP PRB: CPT | Performed by: EMERGENCY MEDICINE

## 2025-03-17 PROCEDURE — 82330 ASSAY OF CALCIUM: CPT | Performed by: EMERGENCY MEDICINE

## 2025-03-17 PROCEDURE — 81003 URINALYSIS AUTO W/O SCOPE: CPT | Performed by: EMERGENCY MEDICINE

## 2025-03-17 PROCEDURE — 258N000003 HC RX IP 258 OP 636: Performed by: EMERGENCY MEDICINE

## 2025-03-17 PROCEDURE — 99284 EMERGENCY DEPT VISIT MOD MDM: CPT | Mod: 25

## 2025-03-17 PROCEDURE — 82310 ASSAY OF CALCIUM: CPT | Performed by: EMERGENCY MEDICINE

## 2025-03-17 PROCEDURE — 84145 PROCALCITONIN (PCT): CPT | Performed by: EMERGENCY MEDICINE

## 2025-03-17 PROCEDURE — 84155 ASSAY OF PROTEIN SERUM: CPT | Performed by: EMERGENCY MEDICINE

## 2025-03-17 PROCEDURE — 71045 X-RAY EXAM CHEST 1 VIEW: CPT

## 2025-03-17 PROCEDURE — 87040 BLOOD CULTURE FOR BACTERIA: CPT | Performed by: EMERGENCY MEDICINE

## 2025-03-17 PROCEDURE — 96360 HYDRATION IV INFUSION INIT: CPT

## 2025-03-17 PROCEDURE — 82010 KETONE BODYS QUAN: CPT | Performed by: EMERGENCY MEDICINE

## 2025-03-17 PROCEDURE — 85004 AUTOMATED DIFF WBC COUNT: CPT | Performed by: EMERGENCY MEDICINE

## 2025-03-17 PROCEDURE — 36415 COLL VENOUS BLD VENIPUNCTURE: CPT | Performed by: EMERGENCY MEDICINE

## 2025-03-17 RX ADMIN — SODIUM CHLORIDE 1000 ML: 9 INJECTION, SOLUTION INTRAVENOUS at 22:10

## 2025-03-17 ASSESSMENT — ACTIVITIES OF DAILY LIVING (ADL)
ADLS_ACUITY_SCORE: 41

## 2025-03-17 ASSESSMENT — COLUMBIA-SUICIDE SEVERITY RATING SCALE - C-SSRS
6. HAVE YOU EVER DONE ANYTHING, STARTED TO DO ANYTHING, OR PREPARED TO DO ANYTHING TO END YOUR LIFE?: NO
1. IN THE PAST MONTH, HAVE YOU WISHED YOU WERE DEAD OR WISHED YOU COULD GO TO SLEEP AND NOT WAKE UP?: NO
2. HAVE YOU ACTUALLY HAD ANY THOUGHTS OF KILLING YOURSELF IN THE PAST MONTH?: NO

## 2025-03-18 ENCOUNTER — HOSPITAL ENCOUNTER (OUTPATIENT)
Facility: HOSPITAL | Age: 60
Setting detail: OBSERVATION
Discharge: HOME | DRG: 870 | End: 2025-03-20
Attending: NURSE PRACTITIONER | Admitting: INTERNAL MEDICINE
Payer: COMMERCIAL

## 2025-03-18 ENCOUNTER — APPOINTMENT (OUTPATIENT)
Dept: GENERAL RADIOLOGY | Facility: HOSPITAL | Age: 60
DRG: 870 | End: 2025-03-18
Attending: NURSE PRACTITIONER
Payer: COMMERCIAL

## 2025-03-18 VITALS
BODY MASS INDEX: 32.56 KG/M2 | DIASTOLIC BLOOD PRESSURE: 81 MMHG | WEIGHT: 189.7 LBS | OXYGEN SATURATION: 94 % | RESPIRATION RATE: 24 BRPM | SYSTOLIC BLOOD PRESSURE: 109 MMHG | HEART RATE: 99 BPM | TEMPERATURE: 100.3 F

## 2025-03-18 DIAGNOSIS — Z78.9 UNABLE TO CARE FOR SELF: ICD-10-CM

## 2025-03-18 DIAGNOSIS — A60.00 GENITAL HERPES SIMPLEX, UNSPECIFIED SITE: ICD-10-CM

## 2025-03-18 DIAGNOSIS — E11.10 DIABETIC KETOSIS (H): ICD-10-CM

## 2025-03-18 DIAGNOSIS — Z72.0 TOBACCO ABUSE: Primary | ICD-10-CM

## 2025-03-18 LAB
ALBUMIN SERPL BCG-MCNC: 4 G/DL (ref 3.5–5.2)
ALBUMIN UR-MCNC: 20 MG/DL
ALP SERPL-CCNC: 122 U/L (ref 40–150)
ALT SERPL W P-5'-P-CCNC: 10 U/L (ref 0–50)
ANION GAP SERPL CALCULATED.3IONS-SCNC: 14 MMOL/L (ref 7–15)
APPEARANCE UR: CLEAR
AST SERPL W P-5'-P-CCNC: 13 U/L (ref 0–45)
ATRIAL RATE - MUSE: 94 BPM
B-OH-BUTYR SERPL-SCNC: 1.07 MMOL/L
BACTERIA #/AREA URNS HPF: ABNORMAL /HPF
BASE EXCESS BLDV CALC-SCNC: 3 MMOL/L (ref -3–3)
BILIRUB SERPL-MCNC: 1 MG/DL
BILIRUB UR QL STRIP: NEGATIVE
BUN SERPL-MCNC: 21.5 MG/DL (ref 8–23)
CALCIUM SERPL-MCNC: 9.3 MG/DL (ref 8.8–10.4)
CHLORIDE SERPL-SCNC: 95 MMOL/L (ref 98–107)
COLOR UR AUTO: ABNORMAL
CREAT SERPL-MCNC: 1.37 MG/DL (ref 0.51–0.95)
DIASTOLIC BLOOD PRESSURE - MUSE: NORMAL MMHG
EGFRCR SERPLBLD CKD-EPI 2021: 44 ML/MIN/1.73M2
ERYTHROCYTE [DISTWIDTH] IN BLOOD BY AUTOMATED COUNT: 14.1 % (ref 10–15)
ETHANOL SERPL-MCNC: <0.01 G/DL
GLUCOSE BLDC GLUCOMTR-MCNC: 219 MG/DL (ref 70–99)
GLUCOSE BLDC GLUCOMTR-MCNC: 303 MG/DL (ref 70–99)
GLUCOSE BLDC GLUCOMTR-MCNC: 318 MG/DL (ref 70–99)
GLUCOSE BLDC GLUCOMTR-MCNC: 365 MG/DL (ref 70–99)
GLUCOSE BLDC GLUCOMTR-MCNC: 380 MG/DL (ref 70–99)
GLUCOSE BLDC GLUCOMTR-MCNC: 491 MG/DL (ref 70–99)
GLUCOSE SERPL-MCNC: 497 MG/DL (ref 70–99)
GLUCOSE UR STRIP-MCNC: >1000 MG/DL
HCO3 BLDV-SCNC: 31 MMOL/L (ref 21–28)
HCO3 SERPL-SCNC: 27 MMOL/L (ref 22–29)
HCT VFR BLD AUTO: 55.5 % (ref 35–47)
HGB BLD-MCNC: 18.7 G/DL (ref 11.7–15.7)
HGB UR QL STRIP: NEGATIVE
HOLD SPECIMEN: NORMAL
HOLD SPECIMEN: NORMAL
INTERPRETATION ECG - MUSE: NORMAL
KETONES UR STRIP-MCNC: ABNORMAL MG/DL
LEUKOCYTE ESTERASE UR QL STRIP: NEGATIVE
MAGNESIUM SERPL-MCNC: 1.9 MG/DL (ref 1.7–2.3)
MCH RBC QN AUTO: 31.1 PG (ref 26.5–33)
MCHC RBC AUTO-ENTMCNC: 33.7 G/DL (ref 31.5–36.5)
MCV RBC AUTO: 92 FL (ref 78–100)
MUCOUS THREADS #/AREA URNS LPF: PRESENT /LPF
NITRATE UR QL: NEGATIVE
O2/TOTAL GAS SETTING VFR VENT: 28 %
OXYHGB MFR BLDV: 52 % (ref 70–75)
P AXIS - MUSE: 74 DEGREES
PCO2 BLDV: 57 MM HG (ref 40–50)
PH BLDV: 7.35 [PH] (ref 7.32–7.43)
PH UR STRIP: 5 [PH] (ref 4.7–8)
PLATELET # BLD AUTO: 236 10E3/UL (ref 150–450)
PO2 BLDV: 30 MM HG (ref 25–47)
POTASSIUM SERPL-SCNC: 5 MMOL/L (ref 3.4–5.3)
PR INTERVAL - MUSE: 182 MS
PROT SERPL-MCNC: 7.7 G/DL (ref 6.4–8.3)
QRS DURATION - MUSE: 96 MS
QT - MUSE: 374 MS
QTC - MUSE: 467 MS
R AXIS - MUSE: 43 DEGREES
RBC # BLD AUTO: 6.02 10E6/UL (ref 3.8–5.2)
RBC URINE: <1 /HPF
SAO2 % BLDV: 54.2 % (ref 70–75)
SODIUM SERPL-SCNC: 136 MMOL/L (ref 135–145)
SP GR UR STRIP: 1.03 (ref 1–1.03)
SQUAMOUS EPITHELIAL: 2 /HPF
SYSTOLIC BLOOD PRESSURE - MUSE: NORMAL MMHG
T AXIS - MUSE: 75 DEGREES
TROPONIN T SERPL HS-MCNC: 26 NG/L
TROPONIN T SERPL HS-MCNC: 31 NG/L
TSH SERPL DL<=0.005 MIU/L-ACNC: 1.18 UIU/ML (ref 0.3–4.2)
UROBILINOGEN UR STRIP-MCNC: NORMAL MG/DL
VENTRICULAR RATE- MUSE: 94 BPM
WBC # BLD AUTO: 15.9 10E3/UL (ref 4–11)
WBC URINE: 3 /HPF

## 2025-03-18 PROCEDURE — 82962 GLUCOSE BLOOD TEST: CPT

## 2025-03-18 PROCEDURE — 85014 HEMATOCRIT: CPT | Performed by: NURSE PRACTITIONER

## 2025-03-18 PROCEDURE — 82010 KETONE BODYS QUAN: CPT | Performed by: NURSE PRACTITIONER

## 2025-03-18 PROCEDURE — 71045 X-RAY EXAM CHEST 1 VIEW: CPT

## 2025-03-18 PROCEDURE — 250N000009 HC RX 250: Performed by: INTERNAL MEDICINE

## 2025-03-18 PROCEDURE — 83735 ASSAY OF MAGNESIUM: CPT | Performed by: NURSE PRACTITIONER

## 2025-03-18 PROCEDURE — 250N000012 HC RX MED GY IP 250 OP 636 PS 637: Performed by: NURSE PRACTITIONER

## 2025-03-18 PROCEDURE — G0378 HOSPITAL OBSERVATION PER HR: HCPCS

## 2025-03-18 PROCEDURE — 36415 COLL VENOUS BLD VENIPUNCTURE: CPT | Performed by: NURSE PRACTITIONER

## 2025-03-18 PROCEDURE — 94640 AIRWAY INHALATION TREATMENT: CPT

## 2025-03-18 PROCEDURE — 93005 ELECTROCARDIOGRAM TRACING: CPT

## 2025-03-18 PROCEDURE — 84155 ASSAY OF PROTEIN SERUM: CPT | Performed by: NURSE PRACTITIONER

## 2025-03-18 PROCEDURE — 258N000003 HC RX IP 258 OP 636: Performed by: INTERNAL MEDICINE

## 2025-03-18 PROCEDURE — 99285 EMERGENCY DEPT VISIT HI MDM: CPT | Performed by: NURSE PRACTITIONER

## 2025-03-18 PROCEDURE — 82077 ASSAY SPEC XCP UR&BREATH IA: CPT | Performed by: NURSE PRACTITIONER

## 2025-03-18 PROCEDURE — 93010 ELECTROCARDIOGRAM REPORT: CPT | Performed by: INTERNAL MEDICINE

## 2025-03-18 PROCEDURE — 84484 ASSAY OF TROPONIN QUANT: CPT | Performed by: NURSE PRACTITIONER

## 2025-03-18 PROCEDURE — 81001 URINALYSIS AUTO W/SCOPE: CPT | Performed by: NURSE PRACTITIONER

## 2025-03-18 PROCEDURE — 999N000157 HC STATISTIC RCP TIME EA 10 MIN

## 2025-03-18 PROCEDURE — 99285 EMERGENCY DEPT VISIT HI MDM: CPT | Mod: 25

## 2025-03-18 PROCEDURE — 250N000013 HC RX MED GY IP 250 OP 250 PS 637: Performed by: INTERNAL MEDICINE

## 2025-03-18 PROCEDURE — 82805 BLOOD GASES W/O2 SATURATION: CPT | Performed by: NURSE PRACTITIONER

## 2025-03-18 PROCEDURE — 84443 ASSAY THYROID STIM HORMONE: CPT | Performed by: NURSE PRACTITIONER

## 2025-03-18 PROCEDURE — 99222 1ST HOSP IP/OBS MODERATE 55: CPT | Performed by: INTERNAL MEDICINE

## 2025-03-18 PROCEDURE — 250N000012 HC RX MED GY IP 250 OP 636 PS 637: Performed by: INTERNAL MEDICINE

## 2025-03-18 RX ORDER — ATORVASTATIN CALCIUM 20 MG/1
40 TABLET, FILM COATED ORAL AT BEDTIME
Status: DISCONTINUED | OUTPATIENT
Start: 2025-03-18 | End: 2025-03-20 | Stop reason: HOSPADM

## 2025-03-18 RX ORDER — AMOXICILLIN 250 MG
1 CAPSULE ORAL 2 TIMES DAILY PRN
Status: DISCONTINUED | OUTPATIENT
Start: 2025-03-18 | End: 2025-03-20 | Stop reason: HOSPADM

## 2025-03-18 RX ORDER — ALBUTEROL SULFATE 90 UG/1
2 INHALANT RESPIRATORY (INHALATION) DAILY PRN
COMMUNITY

## 2025-03-18 RX ORDER — TRIAMCINOLONE ACETONIDE 1 MG/G
OINTMENT TOPICAL 2 TIMES DAILY PRN
COMMUNITY

## 2025-03-18 RX ORDER — PRIMIDONE 50 MG/1
50 TABLET ORAL AT BEDTIME
Status: DISCONTINUED | OUTPATIENT
Start: 2025-03-18 | End: 2025-03-20 | Stop reason: HOSPADM

## 2025-03-18 RX ORDER — ACETAMINOPHEN 325 MG/1
325 TABLET ORAL EVERY 6 HOURS PRN
Status: DISCONTINUED | OUTPATIENT
Start: 2025-03-18 | End: 2025-03-19

## 2025-03-18 RX ORDER — DEXTROSE MONOHYDRATE 25 G/50ML
25-50 INJECTION, SOLUTION INTRAVENOUS
Status: DISCONTINUED | OUTPATIENT
Start: 2025-03-18 | End: 2025-03-20 | Stop reason: HOSPADM

## 2025-03-18 RX ORDER — PROCHLORPERAZINE MALEATE 5 MG/1
10 TABLET ORAL EVERY 6 HOURS PRN
Status: DISCONTINUED | OUTPATIENT
Start: 2025-03-18 | End: 2025-03-20 | Stop reason: HOSPADM

## 2025-03-18 RX ORDER — NICOTINE POLACRILEX 4 MG
15-30 LOZENGE BUCCAL
Status: DISCONTINUED | OUTPATIENT
Start: 2025-03-18 | End: 2025-03-20 | Stop reason: HOSPADM

## 2025-03-18 RX ORDER — ONDANSETRON 2 MG/ML
4 INJECTION INTRAMUSCULAR; INTRAVENOUS EVERY 6 HOURS PRN
Status: DISCONTINUED | OUTPATIENT
Start: 2025-03-18 | End: 2025-03-20 | Stop reason: HOSPADM

## 2025-03-18 RX ORDER — AMOXICILLIN 250 MG
2 CAPSULE ORAL 2 TIMES DAILY PRN
Status: DISCONTINUED | OUTPATIENT
Start: 2025-03-18 | End: 2025-03-20 | Stop reason: HOSPADM

## 2025-03-18 RX ORDER — AMLODIPINE BESYLATE 5 MG/1
5 TABLET ORAL DAILY
Status: DISCONTINUED | OUTPATIENT
Start: 2025-03-19 | End: 2025-03-20 | Stop reason: HOSPADM

## 2025-03-18 RX ORDER — ASPIRIN 81 MG/1
81 TABLET, CHEWABLE ORAL DAILY
Status: DISCONTINUED | OUTPATIENT
Start: 2025-03-19 | End: 2025-03-20 | Stop reason: HOSPADM

## 2025-03-18 RX ORDER — SODIUM CHLORIDE 9 MG/ML
INJECTION, SOLUTION INTRAVENOUS CONTINUOUS
Status: DISCONTINUED | OUTPATIENT
Start: 2025-03-18 | End: 2025-03-20

## 2025-03-18 RX ORDER — ONDANSETRON 4 MG/1
4 TABLET, ORALLY DISINTEGRATING ORAL EVERY 6 HOURS PRN
Status: DISCONTINUED | OUTPATIENT
Start: 2025-03-18 | End: 2025-03-20 | Stop reason: HOSPADM

## 2025-03-18 RX ORDER — LISINOPRIL 20 MG/1
40 TABLET ORAL DAILY
Status: DISCONTINUED | OUTPATIENT
Start: 2025-03-19 | End: 2025-03-20 | Stop reason: HOSPADM

## 2025-03-18 RX ORDER — IPRATROPIUM BROMIDE AND ALBUTEROL SULFATE 2.5; .5 MG/3ML; MG/3ML
3 SOLUTION RESPIRATORY (INHALATION) EVERY 4 HOURS PRN
Status: DISCONTINUED | OUTPATIENT
Start: 2025-03-18 | End: 2025-03-20 | Stop reason: HOSPADM

## 2025-03-18 RX ORDER — SODIUM CHLORIDE 9 MG/ML
INJECTION, SOLUTION INTRAVENOUS CONTINUOUS
Status: DISCONTINUED | OUTPATIENT
Start: 2025-03-18 | End: 2025-03-18

## 2025-03-18 RX ADMIN — INSULIN ASPART 5 UNITS: 100 INJECTION, SOLUTION INTRAVENOUS; SUBCUTANEOUS at 14:14

## 2025-03-18 RX ADMIN — INSULIN ASPART 2 UNITS: 100 INJECTION, SOLUTION INTRAVENOUS; SUBCUTANEOUS at 21:29

## 2025-03-18 RX ADMIN — INSULIN ASPART 4 UNITS: 100 INJECTION, SOLUTION INTRAVENOUS; SUBCUTANEOUS at 17:35

## 2025-03-18 RX ADMIN — IPRATROPIUM BROMIDE AND ALBUTEROL SULFATE 3 ML: .5; 3 SOLUTION RESPIRATORY (INHALATION) at 17:17

## 2025-03-18 RX ADMIN — ATORVASTATIN CALCIUM 40 MG: 20 TABLET, FILM COATED ORAL at 20:23

## 2025-03-18 RX ADMIN — SODIUM CHLORIDE: 9 INJECTION, SOLUTION INTRAVENOUS at 18:18

## 2025-03-18 RX ADMIN — INSULIN GLARGINE 25 UNITS: 100 INJECTION, SOLUTION SUBCUTANEOUS at 15:10

## 2025-03-18 RX ADMIN — PRIMIDONE 50 MG: 50 TABLET ORAL at 20:23

## 2025-03-18 ASSESSMENT — COLUMBIA-SUICIDE SEVERITY RATING SCALE - C-SSRS
6. HAVE YOU EVER DONE ANYTHING, STARTED TO DO ANYTHING, OR PREPARED TO DO ANYTHING TO END YOUR LIFE?: NO
2. HAVE YOU ACTUALLY HAD ANY THOUGHTS OF KILLING YOURSELF IN THE PAST MONTH?: NO
1. IN THE PAST MONTH, HAVE YOU WISHED YOU WERE DEAD OR WISHED YOU COULD GO TO SLEEP AND NOT WAKE UP?: NO

## 2025-03-18 ASSESSMENT — ACTIVITIES OF DAILY LIVING (ADL)
ADLS_ACUITY_SCORE: 43
ADLS_ACUITY_SCORE: 41
ADLS_ACUITY_SCORE: 43
ADLS_ACUITY_SCORE: 41
ADLS_ACUITY_SCORE: 24
ADLS_ACUITY_SCORE: 41
ADLS_ACUITY_SCORE: 24
ADLS_ACUITY_SCORE: 41
ADLS_ACUITY_SCORE: 41

## 2025-03-18 ASSESSMENT — ENCOUNTER SYMPTOMS
WEAKNESS: 1
CONSTITUTIONAL NEGATIVE: 1
MUSCULOSKELETAL NEGATIVE: 1
HEMATOLOGIC/LYMPHATIC NEGATIVE: 1
SHORTNESS OF BREATH: 1
EYES NEGATIVE: 1
ENDOCRINE NEGATIVE: 1
CARDIOVASCULAR NEGATIVE: 1
PSYCHIATRIC NEGATIVE: 1
GASTROINTESTINAL NEGATIVE: 1
ALLERGIC/IMMUNOLOGIC NEGATIVE: 1

## 2025-03-18 NOTE — H&P
[facility]    History and Physical - Hospitalist Service       Date of Admission:  3/18/2025    Assessment & Plan      Marly Cannon is a 59 year old female admitted on 3/18/2025. She has a history of COPD, type 2 diabetes mellitus, chronic kidney disease, tobacco abuse disorder, who was brought in by EMS due to the fact she was found to have hyperglycemia by her home care nurse.    1) DM: Patient with elevated blood sugars and ketosis.  Initially presenting with sugars around 500.  He was given 1 L of fluid and 5 units of NovoLog dropping blood sugar down to 380.  Subsequently patient was given 625 units of Lantus.  Will continue on n.p.o. Lantus sliding scale for the next 6 hours or so to see where her blood sugars settle out and then she consideration for transition to oral oral intake.  May have to wait until the a.m.  Does not appear to be any obvious inciting event as to her elevated blood sugars such as infection.  COVID SARS and influenza were negative.  Patient is noted an elevated white count which we will continue to monitor.    2) COPD: Patient is requiring oxygen.  It is a bit unclear as to what her baseline respiratory status is.  Due to her significantly elevated blood sugars at presentation we will avoid steroids at least initially.  Patient is asymptomatic.  Nasal cannula oxygen will be made available as needed.  Nebs have also been ordered.    3) CKD: Stable      4) HTN: Continue Norvasc and lisinopril.       Observation Goals: -diagnostic tests and consults completed and resulted, -vital signs normal or at patient baseline, Nurse to notify provider when observation goals have been met and patient is ready for discharge.  Diet: NPO for Medical/Clinical Reasons Except for: Ice Chips    DVT Prophylaxis: Enoxaparin (Lovenox) SQ and Pneumatic Compression Devices  Landrum Catheter: Not present  Lines: None     Cardiac Monitoring: None  Code Status: Full Code      Clinically Significant Risk  "Factors Present on Admission         # Hyponatremia: Lowest Na = 132 mmol/L in last 2 days, will monitor as appropriate  # Hypochloremia: Lowest Cl = 87 mmol/L in last 2 days, will monitor as appropriate   # Hypercalcemia: Highest Ca = 10.9 mg/dL in last 2 days, will monitor as appropriate      # Drug Induced Platelet Defect: home medication list includes an antiplatelet medication   # Hypertension: Noted on problem list          # DMII: A1C = N/A within past 6 months   # Obesity: Estimated body mass index is 33.34 kg/m  as calculated from the following:    Height as of this encounter: 1.626 m (5' 4\").    Weight as of this encounter: 88.1 kg (194 lb 3.6 oz).       # COPD: noted on problem list        Disposition Plan     Medically Ready for Discharge: Anticipated Tomorrow           Gentry Saez,   Hospitalist Service  [facility]  Securely message with [employer] (more info)  Text page via [facility] Paging/Directory     ______________________________________________________________________    Chief Complaint   Hyperglycemia    History is obtained from the patient    History of Present Illness   Marly Cannon is a 59 year old female history of type 2 diabetes and noncompliance with last A1c 9.6 presented to the emergency department twice today due to hyperglycemia.  Patient reports that she does check her blood sugars however I am not certain of that.  Patient has a home health nurse who informed EMS as to her elevated blood sugars prompting her to come to the emergency department by ambulance.  Blood sugars were 542 and 497 respectively.  Patient did have some ketosis.  Patient did receive 1 L of normal saline along with 5 units of NovoLog in the emergency department initially bring her sugar down to 380.  In addition she was given 25 units of Lantus in the emergency department.  Also has a history of chronic kidney disease H3, type 2 diabetes mellitus, chronic kidney disease stage III, " emphysema/COPD, intellectual disability and tobacco abuse disorder.  Patient is unable to give a reliable history.  She states that she does check her blood sugars however the only reason that she presented to the emergency department today was due to the fact that her home health nurse called EMS.  Patient denies any chest pain or symptoms of shortness of breath.  In the emergency department she was found to be hypoxic requiring 2 L nasal cannula oxygen.  X-ray was said to be clear.        Past Medical History    No past medical history on file.    Past Surgical History   Past Surgical History:   Procedure Laterality Date    BIOPSY BREAST Left 2008    patient states no bx, Clip in left breast/ stereo bxc  no path in EPIC that far back    COLONOSCOPY N/A 2015    Procedure: COLONOSCOPY;  Surgeon: Gentry Porter MD;  Location: [facility] OR    COLONOSCOPY N/A 2018    Procedure: COLONOSCOPY;  COLONOSCOPY WITH POLYPECTOMY;  Surgeon: Gentry Porter MD;  Location: [facility] OR       Prior to Admission Medications   Prior to Admission Medications   Prescriptions Last Dose Informant Patient Reported? Taking?   Cholecalciferol (VITAMIN D3) 50 MCG ( UT) CAPS 3/18/2025 Morning  No Yes   Sig: TAKE 1 CAPSULE BY MOUTH DAILY   Continuous Glucose Sensor (DEXCOM G7 SENSOR) MISC Not Taking  No No   Si each every 10 days. Change sensor every 10 days   Patient not taking: Reported on 3/18/2025   Continuous Glucose Sensor (FREESTYLE HANK 2 PLUS SENSOR) MISC 3/18/2025  No Yes   Si each every 14 days.   INSULIN PUMP - OUTPATIENT 3/18/2025  No Yes   Sig: Insulin pump  Omnipod Dash  Date: 25  Basal: 1.3 (new)  Total basal: 31.2   CR:15  ISF: 50  BG target: 120  BG correction: 130  Active insulin: 4 hours   Insulin Disposable Pump (OMNIPOD 5 LIBRE2 PLUS G6 PODS) MISC   No No   Si each every 72 hours.   Insulin Disposable Pump (OMNIPOD 5 LIBRE2 PLUS G6) KIT   No No   Si each every 72 hours.    Insulin Disposable Pump (OMNIPOD DASH PODS, GEN 4,) MISC  Self No No   Sig: Inject 1 pod subcutaneously every 48 hours.   OneTouch Delica Lancets 33G MISC  Self No No   Sig: Inject 1 each Subcutaneous 3 times daily (before meals)   acetaminophen (TYLENOL) 325 MG tablet More than a month Self Yes Yes   Sig: Take 325 mg by mouth every 6 hours as needed for mild pain.   albuterol (PROAIR HFA/PROVENTIL HFA/VENTOLIN HFA) 108 (90 Base) MCG/ACT inhaler Unknown  Yes Yes   Sig: Inhale 2 puffs into the lungs daily as needed for shortness of breath.   amLODIPine (NORVASC) 5 MG tablet 3/18/2025 Morning Self No Yes   Sig: Take 1 tablet (5 mg) by mouth daily   aspirin (ASPIRIN LOW DOSE) 81 MG chewable tablet 3/18/2025 Morning Self No Yes   Sig: CHEW AND SWALLOW 1 TABLET BY MOUTH DAILY   atorvastatin (LIPITOR) 40 MG tablet 3/17/2025 Bedtime Self No Yes   Sig: TAKE 1 TABLET BY MOUTH AT BEDTIME   blood glucose (ONETOUCH ULTRA) test strip  Self No No   Sig: USE TO TEST BLOOD SUGAR 4 TIMES DAILY   Patient taking differently: Use to test blood sugar 4 times daily as needed for pump failure   empagliflozin (JARDIANCE) 25 MG TABS tablet 3/18/2025 Morning Self No Yes   Sig: Take 1 tablet (25 mg) by mouth daily   insulin aspart (NOVOLOG VIAL) 100 UNITS/ML vial 3/18/2025  No Yes   Sig: TO BE USED WITH INSULIN PUMP, MAX DAILY UNITS 70   insulin glargine U-300 (TOUJEO SOLOSTAR) 300 UNIT/ML (1 units dial) pen More than a month Self No Yes   Sig: Inject 20 Units Subcutaneous At Bedtime   Patient taking differently: Inject 20 Units subcutaneously nightly as needed (pump failure).   insulin pen needle (32G X 4 MM) 32G X 4 MM miscellaneous  Self No No   Sig: Use 1 pen needles daily   ipratropium-albuterol (COMBIVENT RESPIMAT)  MCG/ACT inhaler 3/17/2025 Bedtime Self No Yes   Sig: INHALE 1 PUFF INTO THE LUNGS 4 TIMES DAILY   Patient taking differently: Inhale 1 puff into the lungs 4 times daily as needed.   ketoconazole (NIZORAL) 2 %  external cream More than a month Self No Yes   Sig: Apply topically daily   Patient taking differently: Apply topically daily as needed.   lisinopril (ZESTRIL) 40 MG tablet 3/18/2025 Morning Self No Yes   Sig: TAKE 1 TABLET BY MOUTH DAILY   order for DME  Self No No   Sig: Women briefs   primidone (MYSOLINE) 50 MG tablet 3/17/2025 Bedtime  No Yes   Sig: TAKE 1 TABLET BY MOUTH AT BEDTIME   tiotropium-olodaterol 2.5-2.5 MCG/ACT AERS 3/17/2025 Bedtime Self Yes Yes   Sig: Inhale 2 puffs into the lungs daily   Patient taking differently: Inhale 2 puffs into the lungs daily as needed.      Facility-Administered Medications: None        Review of Systems    Able to obtain a reliable review of systems.    Social History   I have reviewed this patient's social history and updated it with pertinent information if needed.  Social History     Tobacco Use    Smoking status: Every Day     Current packs/day: 1.00     Average packs/day: 1 pack/day for 46.2 years (46.2 ttl pk-yrs)     Types: Cigarettes     Start date: 1/1/1979     Passive exposure: Current    Smokeless tobacco: Never    Tobacco comments:     Declined QP 05/13/2020   Vaping Use    Vaping status: Never Used   Substance Use Topics    Alcohol use: No     Alcohol/week: 0.0 standard drinks of alcohol    Drug use: No         Family History   I have reviewed this patient's family history and updated it with pertinent information if needed.  Family History   Problem Relation Age of Onset    Diabetes Mother     Diabetes Father     Hypertension Brother     Diabetes Sister          Allergies   No Known Allergies     Physical Exam   Vital Signs: Temp: 99  F (37.2  C) Temp src: Tympanic BP: 134/71 Pulse: 88   Resp: 20 SpO2: (!) 90 % O2 Device: Nasal cannula Oxygen Delivery: 1 LPM  Weight: 194 lbs 3.6 oz    Constitutional: awake, alert, cooperative, no apparent distress, and appears stated age  Respiratory: Wheezing appreciated throughout lung fields bilaterally.  Cardiovascular:  Normal apical impulse, regular rate and rhythm, normal S1 and S2, no S3 or S4, and no murmur noted  Skin: no bruising or bleeding  Musculoskeletal: no lower extremity pitting edema present  Neurologic: No Focal or lateralizing deficits noted.  Neuropsychiatric: Patient is alert responsive and cooperative.  She does seem to have very poor insight and delayed and diminished cognition.    Medical Decision Making       45 MINUTES SPENT BY ME on the date of service doing chart review, history, exam, documentation & further activities per the note.      Data     I have personally reviewed the following data over the past 24 hrs:    15.9 (H)  \   18.7 (H)   / 236     136 95 (L) 21.5 /  365 (H)   5.0 27 1.37 (H) \     ALT: 10 AST: 13 AP: 122 TBILI: 1.0   ALB: 4.0 TOT PROTEIN: 7.7 LIPASE: N/A     Trop: 26 (H) BNP: N/A     TSH: 1.18 T4: N/A A1C: N/A     Procal: 0.11 CRP: N/A Lactic Acid: N/A         Imaging results reviewed over the past 24 hrs:   Recent Results (from the past 24 hours)   XR Chest Port 1 View    Narrative    EXAM: XR CHEST PORT 1 VIEW  LOCATION: [facility]  DATE: 3/17/2025    INDICATION: cough, fever  COMPARISON: None.      Impression    IMPRESSION: Negative chest.   XR Chest Port 1 View    Narrative    PROCEDURE:  XR CHEST PORT 1 VIEW    HISTORY: Hypoxia. .    COMPARISON:  3/17/25    FINDINGS:    The cardiomediastinal contours are stable. There is calcific aortic  atherosclerosis.   No focal consolidation, effusion or pneumothorax.      Impression    IMPRESSION:  Stable chest.      JONATHAN KUMAR MD         SYSTEM ID:  [id number]

## 2025-03-18 NOTE — ED NOTES
Care Transitions focused note:      Email back from Rama Nix at [facility].  No notes to send for screening.  Emailed face sheet.  They will not have a bed for consideration until Thursday.    Need provider note, therapy notes.  Will follow    KEENAN Caputo

## 2025-03-18 NOTE — ED NOTES
Care Transitions focused note:      Calls placed to [facility] (Lauren) and [facility] (Naomie)  Emails sent to both of them as well.    Need PT eval, updated provider.    Left my contact number.  Awaiting calls back.    KEENAN Caputo

## 2025-03-18 NOTE — ED TRIAGE NOTES
Patient presents to ED via [facility] with c/o high blood sugar per EMS BS of 595. PIV placed by EMS 22 R wrist infusing NS. POCT . EMS placed patient on O2 at 2L for O2 sats on RA 90%, patient reports feels better with O2. Per EMS patient lethargic and dry upon arrival after IVF's started and O2 placed patient more alert. Temp 100.9 F. Patient denies pain.

## 2025-03-18 NOTE — ED NOTES
Patient being admitted to acute care - obs via stretcher, escorted by ERT. Nurse to nurse given to KHADIJAH Pena. Insulin pens sent to 3rd floor with ERT. Receiving nurse aware.

## 2025-03-18 NOTE — DISCHARGE INSTRUCTIONS
Please come back if you feel worse. You almost had a fever but your symptoms were non-specific. We checked your lungs and urine for any infection and found none.

## 2025-03-18 NOTE — ED NOTES
Patient and spouse given written and verbal discharge instructions on symptom management, return precautions, follow up care, hyperglycemia, rest and hydration, verbalized understanding. Patient reports all sx resolved at this time. Able to stand and ambulate to wheelchair without assist and was brought out to lobby by significant other. Both deny questions or further concerns.    Per PAULINA Nash for patient to discharge with elevated BG level.

## 2025-03-18 NOTE — MEDICATION SCRIBE - ADMISSION MEDICATION HISTORY
Medicare Wellness Visit  Plan for Preventive Care    A good way for you to stay healthy is to use preventive care.  Medicare covers many services that can help you stay healthy.* The goal of these services is to find any health problems as quickly as possible. Finding problems early can help make them easier to treat.  Your personal plan below lists the services you may need and when they are due.     Health Maintenance Summary     Shingles Vaccine (1 of 2)  Overdue since 11/20/2003    DTaP/Tdap/Td Vaccine (1 - Tdap)  Postponed until 8/24/2021    Diabetes A1C (Every 3 Months)  Next due on 3/29/2021    Colonoscopy Risk (Every 2 Years)  Next due on 7/17/2021    DM/CKD Microalbumin (Yearly)  Next due on 9/21/2021    DM/CKD GFR (Yearly)  Next due on 9/21/2021    Diabetes Eye Exam (Yearly)  Next due on 10/22/2021    Diabetes Foot Exam (Yearly)  Next due on 12/29/2021    Depression Screening (Yearly)  Next due on 12/29/2021    Hepatitis C Screening   Completed    Abdominal Aortic Aneurysm (AAA) Screening   Completed    Influenza Vaccine   Completed    Pneumococcal Vaccine 65+   Completed    Meningococcal Vaccine   Aged Out    HPV Vaccine   Aged Out           Preventive Care for Women and Men    Heart Screenings (Cardiovascular):  · Blood tests are used to check your cholesterol, lipid and triglyceride levels. High levels can increase your risk for heart disease and stroke. High levels can be treated with medications, diet and exercise. Lowering your levels can help keep your heart and blood vessels healthy.  Your provider will order these tests if they are needed.    · An ultrasound is done to see if you have an abdominal aortic aneurysm (AAA).  This is an enlargement of one of the main blood vessels that delivers blood to the body.   In the [place], 9,000 deaths are caused by AAA.  You may not even know you have this problem and as many as 1 in 3 people will have a serious problem if it is not treated.  Early  Medication Scribe Admission Medication History    Admission medication history is complete. The information provided in this note is only as accurate as the sources available at the time of the update.    Information Source(s): Patient, CareEverywhere/SureScripts, and [facility]  via in-person and phone    Pertinent Information:   Patient has medication management through [facility]- phone call completed to patient's nurse, Kristina, who reports patient is very compliant with her medications at home and wears her insulin pump as directed, but does not bolus. Patient reports she took all AM meds as set up per [facility].   Insulin Pump- updated 1/23/25 per diabetic provider, pt reports this should be the most up-to-date information, home care nurse uncertain.   Symbicort 160-4.5 mcg 2 puffs BID listed on [facility] medication list, no recent fill hx- discontinued a year ago per chart. Homecare thinks she still has in home but does not know if using. Not added to PTA meds.     Changes made to PTA medication list:  Added: kenalog ointment, albuterol inhaler (no recent rx's but nurse reports has and still uses)  Deleted: None  Changed:   Combivent/spiriva- pt reports only using as needed.   Ketoconazole- using PRN  Toujeo- PRN pump failure    Allergies reviewed with patient and updates made in EHR: no    Medication History Completed By: Hayde Flores 3/18/2025 4:56 PM    PTA Med List   Medication Sig Last Dose/Taking    acetaminophen (TYLENOL) 325 MG tablet Take 325 mg by mouth every 6 hours as needed for mild pain. More than a month    albuterol (PROAIR HFA/PROVENTIL HFA/VENTOLIN HFA) 108 (90 Base) MCG/ACT inhaler Inhale 2 puffs into the lungs daily as needed for shortness of breath. Unknown    amLODIPine (NORVASC) 5 MG tablet Take 1 tablet (5 mg) by mouth daily 3/18/2025 Morning    aspirin (ASPIRIN LOW DOSE) 81 MG chewable tablet CHEW AND SWALLOW 1 TABLET BY MOUTH DAILY 3/18/2025 Morning     atorvastatin (LIPITOR) 40 MG tablet TAKE 1 TABLET BY MOUTH AT BEDTIME 3/17/2025 Bedtime    Cholecalciferol (VITAMIN D3) 50 MCG (2000 UT) CAPS TAKE 1 CAPSULE BY MOUTH DAILY 3/18/2025 Morning    Continuous Glucose Sensor (FREESTYLE HANK 2 PLUS SENSOR) MISC 1 each every 14 days. 3/18/2025    empagliflozin (JARDIANCE) 25 MG TABS tablet Take 1 tablet (25 mg) by mouth daily 3/18/2025 Morning    insulin aspart (NOVOLOG VIAL) 100 UNITS/ML vial TO BE USED WITH INSULIN PUMP, MAX DAILY UNITS 70 3/18/2025    insulin glargine U-300 (TOUJEO SOLOSTAR) 300 UNIT/ML (1 units dial) pen Inject 20 Units Subcutaneous At Bedtime (Patient taking differently: Inject 20 Units subcutaneously nightly as needed (pump failure).) More than a month    INSULIN PUMP - OUTPATIENT Insulin pump  Omnipod Dash  Date: 1/23/25  Basal: 1.3 (new)  Total basal: 31.2   CR:15  ISF: 50  BG target: 120  BG correction: 130  Active insulin: 4 hours 3/18/2025    ipratropium-albuterol (COMBIVENT RESPIMAT)  MCG/ACT inhaler INHALE 1 PUFF INTO THE LUNGS 4 TIMES DAILY (Patient taking differently: Inhale 1 puff into the lungs 4 times daily as needed.) 3/17/2025 Bedtime    ketoconazole (NIZORAL) 2 % external cream Apply topically daily (Patient taking differently: Apply topically daily as needed.) More than a month    lisinopril (ZESTRIL) 40 MG tablet TAKE 1 TABLET BY MOUTH DAILY 3/18/2025 Morning    primidone (MYSOLINE) 50 MG tablet TAKE 1 TABLET BY MOUTH AT BEDTIME 3/17/2025 Bedtime    tiotropium-olodaterol 2.5-2.5 MCG/ACT AERS Inhale 2 puffs into the lungs daily (Patient taking differently: Inhale 2 puffs into the lungs daily as needed.) 3/17/2025 Bedtime    triamcinolone (KENALOG) 0.1 % external ointment Apply topically 2 times daily as needed (foot irritation). Unknown        diagnosis allows for more effective treatment and cure.  If you have a family history of AAA or are a male age 65-75 who has smoked, you are at higher risk of an AAA.  Your provider can order this test, if needed.    Colorectal Screening:  · There are many tests that are used to check for cancer of your colon and rectum. You and your provider should discuss what test is best for you and when to have it done.  Options include:  · Screening Colonoscopy: exam of the entire colon, seen through a flexible lighted tube.  · Flexible Sigmoidoscopy: exam of the last third (sigmoid portion) of the colon and rectum, seen through a flexible lighted tube.  · Cologuard DNA stool test: a sample of your stool is used to screen for cancer and unseen blood in your stool.  · Fecal Occult Blood Test: a sample of your stool is studied to find any unseen blood    Flu Shot:  · An immunization that helps to prevent influenza (the flu). You should get this every year. The best time to get the shot is in the fall.    Pneumococcal Shot:  • Vaccines are available that can help prevent pneumococcal disease, which is any type of infection caused by Streptococcus pneumoniae bacteria.   Their use can prevent some cases of pneumonia, meningitis, and sepsis. There are two types of pneumococcal vaccines:   o Conjugate vaccines (PCV-13 or Prevnar 13®) - helps protect against the 13 types of pneumococcal bacteria that are the most common causes of serious infections in children and adults.    o Polysaccharide vaccine (PPSV23 or Pepdfwtpb72®) - helps protect against 23 types of pneumococcal bacteria for patients who are recommended to get it.  These vaccines should be given at least 12 months apart.  A booster is usually not needed.     Hepatitis B Shot:  · An immunization that helps to protect people from getting Hepatitis B. Hepatitis B is a virus that spreads through contact with infected blood or body fluids. Many people with the virus do not have  symptoms.  The virus can lead to serious problems, such as liver disease. Some people are at higher risk than others. Your doctor will tell you if you need this shot.     Diabetes Screening:  · A test to measure sugar (glucose) in your blood is called a fasting blood sugar. Fasting means you cannot have food or drink for at least 8 hours before the test. This test can detect diabetes long before you may notice symptoms.    Glaucoma Screening:  · Glaucoma screening is performed by your eye doctor. The test measures the fluid pressure inside your eyes to determine if you have glaucoma.     Hepatitis C Screening:  · A blood test to see if you have the hepatitis C virus.  Hepatitis C attacks the liver and is a major cause of chronic liver disease.  Medicare will cover a single screening for all adults born between 1945 & 1965, or high risk patients (people who have injected illegal drugs or people who have had blood transfusions).  High risk patients who continue to inject illegal drugs can be screened for Hepatitis C every year.    Smoking and Tobacco-Use Cessation Counseling:  · Tobacco is the single greatest cause of disease and early death in our country today. Medication and counseling together can increase a person’s chance of quitting for good.   · Medicare covers two quitting attempts per year, with four counseling sessions per attempt (eight sessions in a 12 month period)    Preventive Screening tests for Women    Screening Mammograms and Breast Exams:  · An x-ray of your breasts to check for breast cancer before you or your doctor may be able to feel it.  If breast cancer is found early it can usually be treated with success.    Pelvic Exams and Pap Tests:  · An exam to check for cervical and vaginal cancer. A Pap test is a lab test in which cells are taken from your cervix and sent to the lab to look for signs of cervical cancer. If cancer of the cervix is found early, chances for a cure are good. Testing can  generally end at age 65, or if a woman has a hysterectomy for a benign condition. Your provider may recommend more frequent testing if certain abnormal results are found.    Bone Mass Measurements:  · A painless x-ray of your bone density to see if you are at risk for a broken bone. Bone density refers to the thickness of bones or how tightly the bone tissue is packed.    Preventive Screening tests for Men    Prostate Screening:  · Should you have a prostate cancer test (PSA)?  It is up to you to decide if you want a prostate cancer test. Talk to your clinician to find out if the test is right for you.  Things for you to consider and talk about should include:  · Benefits and harms of the test  · Your family history  · How your race/ethnicity may influence the test  · If the test may impact other medical conditions you have  · Your values on screenings and treatments    *Medicare pays for many preventive services to keep you healthy. For some of these services, you might have to pay a deductible, coinsurance, and / or copayment.  The amounts vary depending on the type of services you need and the kind of Medicare health plan you have.

## 2025-03-18 NOTE — ED NOTES
Patient's  repeatedly comes out to nurses station to report that patient is becoming restless and wanting to leave. In to see patient, patient seen resting comfortably in bed and very pleasant. Informed both patient and  that we will be rechecking labs now that IVF are completed. Patient in agreement, but  continues to state that patient wants to go home. Informed both patient and  the risks of leaving and the importance of completing workup, verbalized understanding and are agreeing to stay. Encouraged both patient and  to use call button for further needs. Patient declines needs or concerns at this time.

## 2025-03-18 NOTE — PLAN OF CARE
[facility] Inpatient Admission Note:    Patient admitted to 3112/3112-1 at approximately 1615 via cart accompanied by transport tech from emergency room . Report received from Lynn in SBAR format at 1550 via telephone. Patient transferred to bed via slide board.. Patient is alert and oriented X 3, denies pain; rates at 0 on 0-10 scale.  Patient oriented to room, unit, hourly rounding, and plan of care. Explained admission packet and patient handbook with patient bill of rights brochure. Will continue to monitor and document as needed.     Inpatient Nursing criteria listed below was met:    Health care directives status obtained and documented: Yes    Patient identifies a surrogate decision maker: Yes If yes, who:Tavo Contact Information:see facesheet     If initial lactic acid greater than 2.0, repeat lactic acid drawn within one hour of arrival to unit: NA. If no, state reason: na    Clergy visit ordered if patient requests: N/A    Skin issues/needs documented:  deferred to primary RN    Isolation Patient: no Education given, correct sign in place and documentation row added to PCS:   eagle    Fall Prevention Yes: Care plan updated, education given and documented, sticker and magnet in place: Yes    Care Plan initiated: Yes    Education Documented (including assessment): Yes    Patient has discharge needs : Yes If yes, please explain:education

## 2025-03-18 NOTE — ED NOTES
Care Transitions focused note:      Chart reviewed, patient presents to the ED via EMS with high blood sugar.  Home Care nurse who sets up meds had her come in.  She was seen in the ED yesterday evening as well, but came in with back pain and dizziness.  Patient was discharged home with sig other.    Today she is saying she is unable to walk, she is weak, unkept and very tremulous.    She feels she is in need of more care than can be provided at home at this time.  Would benefit from skilled nursing for PT OT and diabetic cares/education.    Medical workup pending.    Will reach out to [facility] in anticipation of a snf referral.    KEENAN Caputo

## 2025-03-19 LAB
ANION GAP SERPL CALCULATED.3IONS-SCNC: 11 MMOL/L (ref 7–15)
BASOPHILS # BLD AUTO: 0 10E3/UL (ref 0–0.2)
BASOPHILS NFR BLD AUTO: 0 %
BUN SERPL-MCNC: 18.5 MG/DL (ref 8–23)
CALCIUM SERPL-MCNC: 8.9 MG/DL (ref 8.8–10.4)
CHLORIDE SERPL-SCNC: 106 MMOL/L (ref 98–107)
CREAT SERPL-MCNC: 1.08 MG/DL (ref 0.51–0.95)
EGFRCR SERPLBLD CKD-EPI 2021: 58 ML/MIN/1.73M2
EOSINOPHIL # BLD AUTO: 0 10E3/UL (ref 0–0.7)
EOSINOPHIL NFR BLD AUTO: 0 %
ERYTHROCYTE [DISTWIDTH] IN BLOOD BY AUTOMATED COUNT: 14.3 % (ref 10–15)
GLUCOSE BLDC GLUCOMTR-MCNC: 144 MG/DL (ref 70–99)
GLUCOSE BLDC GLUCOMTR-MCNC: 167 MG/DL (ref 70–99)
GLUCOSE BLDC GLUCOMTR-MCNC: 182 MG/DL (ref 70–99)
GLUCOSE BLDC GLUCOMTR-MCNC: 188 MG/DL (ref 70–99)
GLUCOSE BLDC GLUCOMTR-MCNC: 227 MG/DL (ref 70–99)
GLUCOSE BLDC GLUCOMTR-MCNC: 255 MG/DL (ref 70–99)
GLUCOSE BLDC GLUCOMTR-MCNC: 271 MG/DL (ref 70–99)
GLUCOSE BLDC GLUCOMTR-MCNC: 300 MG/DL (ref 70–99)
GLUCOSE SERPL-MCNC: 162 MG/DL (ref 70–99)
HCO3 SERPL-SCNC: 25 MMOL/L (ref 22–29)
HCT VFR BLD AUTO: 50.9 % (ref 35–47)
HGB BLD-MCNC: 16.6 G/DL (ref 11.7–15.7)
IMM GRANULOCYTES # BLD: 0.2 10E3/UL
IMM GRANULOCYTES NFR BLD: 1 %
LYMPHOCYTES # BLD AUTO: 0.9 10E3/UL (ref 0.8–5.3)
LYMPHOCYTES NFR BLD AUTO: 6 %
MCH RBC QN AUTO: 30.9 PG (ref 26.5–33)
MCHC RBC AUTO-ENTMCNC: 32.6 G/DL (ref 31.5–36.5)
MCV RBC AUTO: 95 FL (ref 78–100)
MONOCYTES # BLD AUTO: 1.4 10E3/UL (ref 0–1.3)
MONOCYTES NFR BLD AUTO: 9 %
NEUTROPHILS # BLD AUTO: 12.4 10E3/UL (ref 1.6–8.3)
NEUTROPHILS NFR BLD AUTO: 83 %
NRBC # BLD AUTO: 0 10E3/UL
NRBC BLD AUTO-RTO: 0 /100
PLATELET # BLD AUTO: 192 10E3/UL (ref 150–450)
POTASSIUM SERPL-SCNC: 4.3 MMOL/L (ref 3.4–5.3)
RBC # BLD AUTO: 5.38 10E6/UL (ref 3.8–5.2)
SODIUM SERPL-SCNC: 142 MMOL/L (ref 135–145)
WBC # BLD AUTO: 14.9 10E3/UL (ref 4–11)

## 2025-03-19 PROCEDURE — 80048 BASIC METABOLIC PNL TOTAL CA: CPT | Performed by: INTERNAL MEDICINE

## 2025-03-19 PROCEDURE — G0378 HOSPITAL OBSERVATION PER HR: HCPCS

## 2025-03-19 PROCEDURE — 99232 SBSQ HOSP IP/OBS MODERATE 35: CPT | Performed by: INTERNAL MEDICINE

## 2025-03-19 PROCEDURE — 99213 OFFICE O/P EST LOW 20 MIN: CPT | Mod: 25 | Performed by: NURSE PRACTITIONER

## 2025-03-19 PROCEDURE — 250N000013 HC RX MED GY IP 250 OP 250 PS 637: Performed by: INTERNAL MEDICINE

## 2025-03-19 PROCEDURE — 999N000157 HC STATISTIC RCP TIME EA 10 MIN

## 2025-03-19 PROCEDURE — 82962 GLUCOSE BLOOD TEST: CPT

## 2025-03-19 PROCEDURE — 85004 AUTOMATED DIFF WBC COUNT: CPT | Performed by: INTERNAL MEDICINE

## 2025-03-19 PROCEDURE — 36415 COLL VENOUS BLD VENIPUNCTURE: CPT | Performed by: INTERNAL MEDICINE

## 2025-03-19 PROCEDURE — 250N000012 HC RX MED GY IP 250 OP 636 PS 637: Performed by: INTERNAL MEDICINE

## 2025-03-19 PROCEDURE — 258N000003 HC RX IP 258 OP 636: Performed by: INTERNAL MEDICINE

## 2025-03-19 RX ORDER — ACETAMINOPHEN 325 MG/1
975 TABLET ORAL EVERY 6 HOURS PRN
Status: DISCONTINUED | OUTPATIENT
Start: 2025-03-19 | End: 2025-03-20 | Stop reason: HOSPADM

## 2025-03-19 RX ADMIN — SODIUM CHLORIDE: 9 INJECTION, SOLUTION INTRAVENOUS at 18:35

## 2025-03-19 RX ADMIN — INSULIN ASPART 1 UNITS: 100 INJECTION, SOLUTION INTRAVENOUS; SUBCUTANEOUS at 01:00

## 2025-03-19 RX ADMIN — INSULIN ASPART 1 UNITS: 100 INJECTION, SOLUTION INTRAVENOUS; SUBCUTANEOUS at 10:07

## 2025-03-19 RX ADMIN — ATORVASTATIN CALCIUM 40 MG: 20 TABLET, FILM COATED ORAL at 21:25

## 2025-03-19 RX ADMIN — INSULIN ASPART 2 UNITS: 100 INJECTION, SOLUTION INTRAVENOUS; SUBCUTANEOUS at 18:31

## 2025-03-19 RX ADMIN — PRIMIDONE 50 MG: 50 TABLET ORAL at 21:25

## 2025-03-19 RX ADMIN — INSULIN ASPART 4 UNITS: 100 INJECTION, SOLUTION INTRAVENOUS; SUBCUTANEOUS at 22:57

## 2025-03-19 RX ADMIN — LISINOPRIL 40 MG: 20 TABLET ORAL at 08:05

## 2025-03-19 RX ADMIN — ASPIRIN 81 MG: 81 TABLET, CHEWABLE ORAL at 08:06

## 2025-03-19 RX ADMIN — SODIUM CHLORIDE: 9 INJECTION, SOLUTION INTRAVENOUS at 07:43

## 2025-03-19 RX ADMIN — INSULIN ASPART 3 UNITS: 100 INJECTION, SOLUTION INTRAVENOUS; SUBCUTANEOUS at 12:11

## 2025-03-19 RX ADMIN — INSULIN ASPART 1 UNITS: 100 INJECTION, SOLUTION INTRAVENOUS; SUBCUTANEOUS at 05:12

## 2025-03-19 RX ADMIN — INSULIN GLARGINE 20 UNITS: 100 INJECTION, SOLUTION SUBCUTANEOUS at 10:09

## 2025-03-19 RX ADMIN — EMPAGLIFLOZIN 25 MG: 25 TABLET, FILM COATED ORAL at 08:06

## 2025-03-19 RX ADMIN — AMLODIPINE BESYLATE 5 MG: 5 TABLET ORAL at 08:06

## 2025-03-19 RX ADMIN — ACETAMINOPHEN 325 MG: 325 TABLET, FILM COATED ORAL at 14:43

## 2025-03-19 ASSESSMENT — ACTIVITIES OF DAILY LIVING (ADL)
ADLS_ACUITY_SCORE: 45
ADLS_ACUITY_SCORE: 49
ADLS_ACUITY_SCORE: 46
ADLS_ACUITY_SCORE: 41
ADLS_ACUITY_SCORE: 49
ADLS_ACUITY_SCORE: 45
ADLS_ACUITY_SCORE: 45
ADLS_ACUITY_SCORE: 49
ADLS_ACUITY_SCORE: 45
ADLS_ACUITY_SCORE: 49
ADLS_ACUITY_SCORE: 45
ADLS_ACUITY_SCORE: 49
ADLS_ACUITY_SCORE: 45
ADLS_ACUITY_SCORE: 45
ADLS_ACUITY_SCORE: 49
ADLS_ACUITY_SCORE: 45
ADLS_ACUITY_SCORE: 49
ADLS_ACUITY_SCORE: 49
ADLS_ACUITY_SCORE: 46
ADLS_ACUITY_SCORE: 49
ADLS_ACUITY_SCORE: 46

## 2025-03-19 ASSESSMENT — ENCOUNTER SYMPTOMS
CHILLS: 0
COUGH: 0
FEVER: 0
SHORTNESS OF BREATH: 0

## 2025-03-19 NOTE — PLAN OF CARE
Goal Outcome Evaluation:  Upon general assessment, patient was completely somnolent and did need stimulation to eventually wake up, MD was made aware, vitals and accu checks are as charted, patient has denied pain, but throughout the day, patient does become very drowsy and is difficult to rouse, patent  has been able to transfer from the bed to the chair and so forth with assist of staff, we were able to obtain patients insulin pump from home as her home care RN was able to bring it in, and the home insulin is in the [facility] refrigerator, per pharmacy they will verify this tomorrow as diabetic education is to see patient tomorrow, patient does have lesions to her reji area and MD was updated, at present, patient is awake and up in the chair, blood pressures have been low, MD is aware.

## 2025-03-19 NOTE — PLAN OF CARE
Goal Outcome Evaluation:  Blood pressure is low, heart rate is elevated, and patient has a low grade temMD coy was updated.

## 2025-03-19 NOTE — PLAN OF CARE
Goal Outcome Evaluation:      Plan of Care Reviewed With: patient    Overall Patient Progress: improvingOverall Patient Progress: improving  Admitted to this unit at 1700. A&OX4. Pleasant and cooperative with cares. R[place]ains NPO. Taking ice chips. Glucose Q4H. IVF- NS @ 75/hr. Bed alarm on at all times. Did sit in chair with assist of 2 as bed saturated with urine. Sig other brought in clothes for pt. Was supposed to bring in some home meds but did not. Pt called sig other this evening and reminded him to bring in home meds tomorrow.     Face to face report given with opportunity to observe patient.  Report given to Debbie CANDELARIA RN.    Naomie Sanford RN  3/18/2025, 10:36 PM

## 2025-03-19 NOTE — PLAN OF CARE
Goal Outcome Evaluation:this writer did go in to check her pump, this writer was unable to wake patient, we did a sternal rub and patients night shift nurse was called in so we could assess her, a set of vitals were obtained, and an accu check was done, glucose is 144, patient did appear to be aware of staff presence as she was able to squeeze our hands  but did not open her eyes, provider will be updated.

## 2025-03-19 NOTE — PLAN OF CARE
"Goal Outcome Evaluation:      Plan of Care Reviewed With: patient    Overall Patient Progress: improvingOverall Patient Progress: improving    Patient is alert and oriented, makes needs known. Up to commode with assist of one. Patient denies pain. Patient remains on 2LNC. BG  188 and 182, coverage per MAR. Assessments as charted. BP (!) 150/79 (BP Location: Left arm, Cuff Size: Adult Regular)   Pulse 89   Temp 100  F (37.8  C) (Tympanic)   Resp 20   Ht 1.626 m (5' 4\")   Wt 88.1 kg (194 lb 3.6 oz)   SpO2 96%   BMI 33.34 kg/m      Face to face report given with opportunity to observe patient.    Report given to KHADIJAH Talley RN   3/19/2025  7:14 AM        "

## 2025-03-19 NOTE — PROGRESS NOTES
Assessment completed with Marly.    LOC: alert     Dx: DKA  Chronic Disease Management: CKD, Pulmonary emphysema, Intellectual disability, Tobacco abuse, Hyperlipidemia, ADEEL     Lives with: sig other  Living at:  apartment in [place]  Transportation: YES, city bus    Primary PCP: Amberly Whyte  Insurance:  [facility]      Support System:  significant other  Homecare/PCA: skilled nursing for med setup only  /County Services:   does not know name  : NO      How was the [facility] notification completed: n/a    Health Care Directive: POLST on file  Guardian: no  POA: no    Pharmacy: [facility]  Meds management: [place] nurse    Adequate Resources for needs (housing, utilities, food/med): YES  Household chores: shared  Work/community/social activity: YES     ADLs: Independent  Ambulation:independent  Falls: independent  Nutrition: no concerns  Sleep: sleeps well    Equipment used: none      Oxygen supplier: no      Does the supplier have valid oxygen orders: no    Mental health: denies  Substance abuse: tobacco abuse  Exposure to violence/abuse: denies  Stressors: not asked    Able to Return to Prior Living Arrangements: YES    Choice of Vendor: n/a    Barriers: cognitive disability    RICHARD: low    Plan: home    Ashlyn Reyna CM

## 2025-03-19 NOTE — PROGRESS NOTES
[facility]    Hospitalist Progress Note    Date of Service (when I saw the patient): 03/19/2025    Assessment & Plan   Marly Cannon is a 59 year old female who was admitted on 3/18/2025.     IDDM: Patient with elevated blood sugars and ketosis.  Initially presenting with sugars around 500.  He was given 1 L of fluid and 5 units of NovoLog dropping blood sugar down to 380.  Subsequently patient was given 25 units of Lantus.  Will continue on n.p.o. Lantus sliding scale for the next 6 hours or so to see where her blood sugars settle out and then she consideration for transition to oral oral intake.  May have to wait until the a.m.  Does not appear to be any obvious inciting event as to her elevated blood sugars such as infection.  COVID SARS and influenza were negative.  Patient is noted an elevated white count which we will continue to monitor.  -3/19: Blood sugar now controlled with 20 units of Lantus plus sliding scale, no signs of DKA.  Insulin compliance is an issue, patient is supposed to be on insulin pump.  Patient has misplaced pump at least once.  Diabetic education applied see patient as well.  They will arrange follow-up shortly to reinstitute the pump.  In the meantime, patient will need subcutaneous administration.  Continue with 20 of Lantus.     COPD: Patient is requiring oxygen.  It is a bit unclear as to what her baseline respiratory status is.  Due to her significantly elevated blood sugars at presentation we will avoid steroids at least initially.  Patient is asymptomatic.  Nasal cannula oxygen will be made available as needed.  Nebs have also been ordered.  -3/19: Stable, no signs of exacerbation.  Patient requiring 2 L.  Will attempt to wean, but may need to discharge patient with supplemental oxygen     CKD: Stable     HTN: Continue Norvasc and lisinopril.     FEN: Oral diabetic diet as tolerated    Clinically Significant Risk Factors Present on Admission         # Hyponatremia:  "Lowest Na = 132 mmol/L in last 2 days, will monitor as appropriate  # Hypochloremia: Lowest Cl = 87 mmol/L in last 2 days, will monitor as appropriate   # Hypercalcemia: Highest Ca = 10.9 mg/dL in last 2 days, will monitor as appropriate      # Drug Induced Platelet Defect: home medication list includes an antiplatelet medication   # Hypertension: Noted on problem list          # DMII: A1C = N/A within past 6 months   # Obesity: Estimated body mass index is 33.34 kg/m  as calculated from the following:    Height as of this encounter: 1.626 m (5' 4\").    Weight as of this encounter: 88.1 kg (194 lb 3.6 oz).       # COPD: noted on problem list        DVT Prophylaxis: Low Risk/Ambulatory with no VTE prophylaxis indicated    Code Status: Full Code    Disposition: Expected discharge in 1-2 days once blood sugar controlled, oxygen weaned.    Bibi Cancino MD, MD        Interval History   Patient seen in room.  Patient is awake, alert, no distress.  No acute complaints.  Blood sugars now controlled with Lantus.  Patient does not have insulin pump with her.    -Data reviewed today: I reviewed all new labs and imaging results over the last 24 hours. I personally reviewed imaging reports.    Physical Exam   Temp: 97.7  F (36.5  C) Temp src: Tympanic BP: 123/76 Pulse: 96   Resp: 20 SpO2: 98 % O2 Device: Nasal cannula Oxygen Delivery: 2 LPM  Vitals:    03/18/25 1615   Weight: 88.1 kg (194 lb 3.6 oz)     Vital Signs with Ranges  Temp:  [97.7  F (36.5  C)-100.7  F (38.2  C)] 97.7  F (36.5  C)  Pulse:  [] 96  Resp:  [18-22] 20  BP: (118-150)/(67-95) 123/76  SpO2:  [86 %-98 %] 98 %    Intake/Output Summary (Last 24 hours) at 3/19/2025 1348  Last data filed at 3/19/2025 1248  Gross per 24 hour   Intake 1418 ml   Output 750 ml   Net 668 ml       Peripheral IV 03/18/25 Anterior;Distal;Right Lower forearm (Active)   Site Assessment WDL 03/19/25 1300   Line Status Infusing 03/19/25 0750   Dressing Transparent 03/19/25 0750 "   Dressing Status clean;dry;intact 03/19/25 0750   Phlebitis Scale 0-->no symptoms 03/19/25 0750   Infiltration? no 03/19/25 0750   Number of days: 1     No line/device    Constitutional - AA, NAD  HEENT - atraumatic, normocephalic  Neck - supple, no masses, no JVD  CVS - S1 S2 RRR, no murmurs, rubs, gallops  Respiratory - CTA b/l  GI - soft, NT, ND, + bowel sounds, no organomegaly  Musculoskeletal - no LE edema, no lesions  Neuro - oriented x 3, no gross focal deficits      Medications   Current Facility-Administered Medications   Medication Dose Route Frequency Provider Last Rate Last Admin    sodium chloride 0.9 % infusion   Intravenous Continuous Gentry Saez P, DO 75 mL/hr at 03/19/25 0743 New Bag at 03/19/25 0743     Current Facility-Administered Medications   Medication Dose Route Frequency Provider Last Rate Last Admin    amLODIPine (NORVASC) tablet 5 mg  5 mg Oral Daily Gentry Saez P, DO   5 mg at 03/19/25 0806    aspirin (ASA) chewable tablet 81 mg  81 mg Oral Daily Gentry Saez P, DO   81 mg at 03/19/25 0806    atorvastatin (LIPITOR) tablet 40 mg  40 mg Oral At Bedtime Gentry Saez P, DO   40 mg at 03/18/25 2023    empagliflozin (JARDIANCE) tablet 25 mg  25 mg Oral Daily Chester Saezony P, DO   25 mg at 03/19/25 0806    insulin aspart (NovoLOG) injection (RAPID ACTING)  1-7 Units Subcutaneous Q4H Gentry Saez P, DO   3 Units at 03/19/25 1211    insulin glargine (LANTUS PEN) injection 20 Units  20 Units Subcutaneous QAM AC Gentry Saez P, DO   20 Units at 03/19/25 1009    lisinopril (ZESTRIL) tablet 40 mg  40 mg Oral Daily Chester Saezony P, DO   40 mg at 03/19/25 0805    primidone (MYSOLINE) tablet 50 mg  50 mg Oral At Bedtime Gentry Saez P, DO   50 mg at 03/18/25 2023    umeclidinium-vilanterol (ANORO ELLIPTA) 62.5-25 MCG/ACT oral inhaler 1 puff  1 puff Inhalation Daily Gentry Saez P, DO           Data   Recent Labs   Lab  03/19/25  1159 03/19/25  1006 03/19/25  0711 03/19/25  0518 03/18/25  1352 03/18/25  1209 03/18/25  1149 03/17/25  2327 03/17/25  2048   WBC  --   --   --  14.9*  --  15.9*  --  16.7*  --    HGB  --   --   --  16.6*  --  18.7*  --  17.8*  --    MCV  --   --   --  95  --  92  --  92  --    PLT  --   --   --  192  --  236  --  205  --    NA  --   --   --  142  --  136  --  132* 132*   POTASSIUM  --   --   --  4.3  --  5.0  --  5.1 5.2   CHLORIDE  --   --   --  106  --  95*  --  95* 87*   CO2  --   --   --  25  --  27  --  26 27   BUN  --   --   --  18.5  --  21.5  --  22.3 21.9   CR  --   --   --  1.08*  --  1.37*  --  1.34* 1.41*   ANIONGAP  --   --   --  11  --  14  --  11 18*   NORM  --   --   --  8.9  --  9.3  --  9.0 10.9*   * 167* 144* 162*   < > 497*   < > 542* 562*   ALBUMIN  --   --   --   --   --  4.0  --   --  4.4   PROTTOTAL  --   --   --   --   --  7.7  --   --  9.0*   BILITOTAL  --   --   --   --   --  1.0  --   --  0.8   ALKPHOS  --   --   --   --   --  122  --   --  147   ALT  --   --   --   --   --  10  --   --  13   AST  --   --   --   --   --  13  --   --  17    < > = values in this interval not displayed.          No results found for this or any previous visit (from the past 24 hours).    Bibi Cancino MD

## 2025-03-20 VITALS
BODY MASS INDEX: 33.16 KG/M2 | TEMPERATURE: 99.1 F | OXYGEN SATURATION: 94 % | RESPIRATION RATE: 20 BRPM | HEART RATE: 101 BPM | SYSTOLIC BLOOD PRESSURE: 155 MMHG | DIASTOLIC BLOOD PRESSURE: 81 MMHG | HEIGHT: 64 IN | WEIGHT: 194.22 LBS

## 2025-03-20 LAB
ANION GAP SERPL CALCULATED.3IONS-SCNC: 13 MMOL/L (ref 7–15)
BACTERIA BLD CULT: NORMAL
BUN SERPL-MCNC: 24.6 MG/DL (ref 8–23)
CALCIUM SERPL-MCNC: 9.4 MG/DL (ref 8.8–10.4)
CHLORIDE SERPL-SCNC: 104 MMOL/L (ref 98–107)
CREAT SERPL-MCNC: 1.09 MG/DL (ref 0.51–0.95)
EGFRCR SERPLBLD CKD-EPI 2021: 58 ML/MIN/1.73M2
ERYTHROCYTE [DISTWIDTH] IN BLOOD BY AUTOMATED COUNT: 14.2 % (ref 10–15)
GLUCOSE BLDC GLUCOMTR-MCNC: 167 MG/DL (ref 70–99)
GLUCOSE BLDC GLUCOMTR-MCNC: 177 MG/DL (ref 70–99)
GLUCOSE SERPL-MCNC: 173 MG/DL (ref 70–99)
HCO3 SERPL-SCNC: 24 MMOL/L (ref 22–29)
HCT VFR BLD AUTO: 54 % (ref 35–47)
HGB BLD-MCNC: 17.5 G/DL (ref 11.7–15.7)
MCH RBC QN AUTO: 31 PG (ref 26.5–33)
MCHC RBC AUTO-ENTMCNC: 32.4 G/DL (ref 31.5–36.5)
MCV RBC AUTO: 96 FL (ref 78–100)
PLATELET # BLD AUTO: 232 10E3/UL (ref 150–450)
POTASSIUM SERPL-SCNC: 4.3 MMOL/L (ref 3.4–5.3)
RBC # BLD AUTO: 5.64 10E6/UL (ref 3.8–5.2)
SODIUM SERPL-SCNC: 141 MMOL/L (ref 135–145)
WBC # BLD AUTO: 13.7 10E3/UL (ref 4–11)

## 2025-03-20 PROCEDURE — 82962 GLUCOSE BLOOD TEST: CPT

## 2025-03-20 PROCEDURE — 86341 ISLET CELL ANTIBODY: CPT | Performed by: NURSE PRACTITIONER

## 2025-03-20 PROCEDURE — 99239 HOSP IP/OBS DSCHRG MGMT >30: CPT | Performed by: INTERNAL MEDICINE

## 2025-03-20 PROCEDURE — G0378 HOSPITAL OBSERVATION PER HR: HCPCS

## 2025-03-20 PROCEDURE — 80048 BASIC METABOLIC PNL TOTAL CA: CPT | Performed by: INTERNAL MEDICINE

## 2025-03-20 PROCEDURE — 36415 COLL VENOUS BLD VENIPUNCTURE: CPT | Performed by: INTERNAL MEDICINE

## 2025-03-20 PROCEDURE — 250N000013 HC RX MED GY IP 250 OP 250 PS 637: Performed by: INTERNAL MEDICINE

## 2025-03-20 PROCEDURE — 258N000003 HC RX IP 258 OP 636: Performed by: INTERNAL MEDICINE

## 2025-03-20 PROCEDURE — 84681 ASSAY OF C-PEPTIDE: CPT | Performed by: NURSE PRACTITIONER

## 2025-03-20 PROCEDURE — 99213 OFFICE O/P EST LOW 20 MIN: CPT | Mod: 25 | Performed by: NURSE PRACTITIONER

## 2025-03-20 PROCEDURE — 85027 COMPLETE CBC AUTOMATED: CPT | Performed by: INTERNAL MEDICINE

## 2025-03-20 RX ORDER — ACYCLOVIR 400 MG/1
400 TABLET ORAL 3 TIMES DAILY
Qty: 21 TABLET | Refills: 0 | Status: SHIPPED | OUTPATIENT
Start: 2025-03-20 | End: 2025-03-27

## 2025-03-20 RX ORDER — ACYCLOVIR 400 MG/1
400 TABLET ORAL 3 TIMES DAILY
Status: DISCONTINUED | OUTPATIENT
Start: 2025-03-20 | End: 2025-03-20 | Stop reason: HOSPADM

## 2025-03-20 RX ADMIN — SODIUM CHLORIDE: 9 INJECTION, SOLUTION INTRAVENOUS at 07:06

## 2025-03-20 RX ADMIN — ACYCLOVIR 400 MG: 400 TABLET ORAL at 14:51

## 2025-03-20 RX ADMIN — INSULIN ASPART 1 UNITS: 100 INJECTION, SOLUTION INTRAVENOUS; SUBCUTANEOUS at 03:04

## 2025-03-20 RX ADMIN — INSULIN ASPART 1 UNITS: 100 INJECTION, SOLUTION INTRAVENOUS; SUBCUTANEOUS at 07:09

## 2025-03-20 RX ADMIN — LISINOPRIL 40 MG: 20 TABLET ORAL at 08:44

## 2025-03-20 RX ADMIN — ACYCLOVIR 400 MG: 400 TABLET ORAL at 10:58

## 2025-03-20 RX ADMIN — EMPAGLIFLOZIN 25 MG: 25 TABLET, FILM COATED ORAL at 08:44

## 2025-03-20 RX ADMIN — ASPIRIN 81 MG: 81 TABLET, CHEWABLE ORAL at 08:44

## 2025-03-20 RX ADMIN — AMLODIPINE BESYLATE 5 MG: 5 TABLET ORAL at 08:44

## 2025-03-20 ASSESSMENT — ACTIVITIES OF DAILY LIVING (ADL)
ADLS_ACUITY_SCORE: 45
ADLS_ACUITY_SCORE: 45
ADLS_ACUITY_SCORE: 46
ADLS_ACUITY_SCORE: 45
ADLS_ACUITY_SCORE: 45
ADLS_ACUITY_SCORE: 53
ADLS_ACUITY_SCORE: 45
ADLS_ACUITY_SCORE: 46
ADLS_ACUITY_SCORE: 45
ADLS_ACUITY_SCORE: 45
ADLS_ACUITY_SCORE: 46
ADLS_ACUITY_SCORE: 46
ADLS_ACUITY_SCORE: 45
ADLS_ACUITY_SCORE: 46
ADLS_ACUITY_SCORE: 45

## 2025-03-20 NOTE — CONSULTS
Saw Marly this morning  The insulin pump (Omnipod 5) that was brought to the hospital is the new insulin pump  She hasn't been trained on this and unfortunately it take about 2 hours to do so.      I called her home care nurse  Spoke to Candy first, then Kristina Acevedo states that Marly ran out of her old insulin pump supplies per her significant other.    The box was what he gave Kristina  Unsure about her old Omnipod Dash PDM  Doesn't recall if she got the new Freestyle bud 2 plus (personal CGM)    Haven't heard from [employer]    LM with another rep    Spoke to [place] pharmacy  Marly can't get the new Freestyle Bud 2 plus until after  as she got a 90 days supply of her Freestyle Bud 3  Currently, she isnt wearing one but does have the reader/ in her hospital room     I was able to find a sample Omnipod Dash PDM with pods  Order received to place insulin pump    As she's lost some weight, I did do a weight base calculation for her Omnipod Dash   Wt Readings from Last 4 Encounters:   25 88.1 kg (194 lb 3.6 oz)   25 86 kg (189 lb 11.2 oz)   25 92.5 kg (204 lb)   24 92.9 kg (204 lb 12.5 oz)     Insulin pump settings:  Basal: 0.9 unit(s)/hr  Total basal: 21.6 units  CR: 10  ISF: 40  Target B  Correct above: 130    Marly was able to place the omnipod dash pod on her stomach without issue  Used her Novolog vial from home (this was already cleared by pharmacy)    Please call with any issues/concerns     Follow up   Next week with me  Home care nurse on Tuesday    Courtney Chaudhary APRN FNP-BC  Diabetes and Wound Care

## 2025-03-20 NOTE — PLAN OF CARE
Goal Outcome Evaluation:Face to face report given with opportunity to observe patient.    Report given to Sydni Vail RN   3/19/2025  7:34 PM

## 2025-03-20 NOTE — PROGRESS NOTES
Patient has been assessed for Home Oxygen needs. Oxygen readings:    *Pulse oximetry (SpO2) = 87% on room air at rest while awake.    *SpO2 improved to 93% on 2 liters/minute at rest.    *SpO2 = 85% on room air during activity/with exercise.    *SpO2 improved to 93% on 2 liters/minute during activity/with exercise.

## 2025-03-20 NOTE — DISCHARGE SUMMARY
[facility]    Discharge Summary  Hospitalist    Date of Admission:  3/18/2025  Date of Discharge:  3/20/2025  Discharging Provider: Bibi Cancino MD, MD  Date of Service (when I saw the patient): 03/20/25    Discharge Diagnoses   Active Problems:    Diabetic ketosis (H)    Unable to care for self    Hyperglycemia    Suspected chronic hypoxic respiratory failure with COPD    Intellectual delay      History of Present Illness   Marly Cannon is an 59 year old female who presented with hyperglycemia.  Please see admission H+P for additional details.    Hospital Course   Marly Cannon was admitted on 3/18/2025.  59-year-old female with history of intellectual delay, insulin-dependent diabetes mellitus type 2 who is supposed to be on an insulin pump, COPD who presents after her home care nurse called EMS due to an elevated blood sugar above 500.  Fortunately, patient had no evidence of acidosis, but did have slight evidence of ketosis.  Patient's blood sugar was controlled well with subcutaneous insulin.  Patient is followed by diabetic education, and is supposed to be on an insulin pump.  Due to her intellectual delay and various other factors, her insulin pump was not attached to her.  It was reported that the patient has not received insulin for several days prior to admission, which would explain her hyperglycemia.  Appreciate diabetic education assistance, patient's insulin pump is now in place and functioning appropriately.  She was found to be hypoxic requiring 2 L of supplemental oxygen.  She has no shortness of breath, however with her history of COPD and her hemoglobin at 17.5, she is likely chronically hypoxic.  We will prescribe her 2 L supplemental oxygen to take at home.  She is currently stable to be discharged from acute care.  Will have her follow-up with her primary care physician within the week, as well as diabetic education.  Of note, she was prescribed 7 days of acyclovir for  genital warts.    Bibi Cancino MD      Significant Results and Procedures   See below    Pending Results   These results will be followed up by Amberly Whyte    Unresulted Labs Ordered in the Past 30 Days of this Admission       Date and Time Order Name Status Description    3/19/2025 11:00 PM Glutamic acid decarboxylase antibody In process     3/19/2025 11:00 PM C-peptide In process     3/17/2025  8:44 PM Blood Culture Arm, Left Preliminary             Code Status   Full Code       Primary Care Physician   Amberly Whyte    Physical Exam   Temp: 99.1  F (37.3  C) Temp src: Tympanic BP: (!) 155/81 Pulse: 101   Resp: 20 SpO2: 94 % O2 Device: Nasal cannula Oxygen Delivery: 1 LPM  Vitals:    03/18/25 1615   Weight: 88.1 kg (194 lb 3.6 oz)     Vital Signs with Ranges  Temp:  [98.9  F (37.2  C)-100.5  F (38.1  C)] 99.1  F (37.3  C)  Pulse:  [] 101  Resp:  [18-20] 20  BP: ()/(60-81) 155/81  SpO2:  [86 %-98 %] 94 %  I/O last 3 completed shifts:  In: 1358 [P.O.:360; I.V.:998]  Out: 1850 [Urine:1850]    Constitutional - AA, NAD  HEENT - atraumatic, normocephalic  Neck - supple, no masses, no JVD  CVS - S1 S2 RRR, no murmurs, rubs, gallops  Respiratory - CTA b/l  GI - soft, NT, ND, + bowel sounds, no organomegaly  Musculoskeletal - no LE edema, no lesions  Neuro - oriented x 3, no gross focal deficits      Discharge Disposition   Discharged to home  Condition at discharge: Stable    Consultations This Hospital Stay   DIABETES EDUCATION IP CONSULT  DIABETES EDUCATION IP CONSULT    Time Spent on this Encounter   IBibi MD, personally saw the patient today and spent greater than 30 minutes discharging this patient.    Discharge Orders      Reason for your hospital stay    Hyperglycemia     Follow-up and recommended labs and tests     Follow up with primary care provider, Amberly Whyte, within 7 days for hospital follow- up.  No follow up labs or test are needed.    Follow up with diabetic education  within 1 week.for hospital follow- up.  No follow up labs or test are needed.     Activity    Your activity upon discharge: activity as tolerated     Oxygen Adult/Peds    Oxygen Documentation  I certify that this patient, Marly Cannon has been under my care (or a nurse practitioner or physican's assistant working with me). This is the face-to-face encounter for oxygen medical necessity.      At the time of this encounter, I have reviewed the qualifying testing and have determined that supplemental oxygen is reasonable and necessary and is expected to improve the patient's condition in a home setting.         Patient has continued oxygen desaturation due to COPD J44.9.    If portability is ordered, is the patient mobile within the home? yes    Was this visit performed as a telehealth visit: No     Diet    Follow this diet upon discharge: Current Diet:Orders Placed This Encounter      Consistent Carbohydrate Diet Moderate Consistent Carb (60 g CHO per Meal) Diet     Discharge Medications   Current Discharge Medication List        START taking these medications    Details   acyclovir (ZOVIRAX) 400 MG tablet Take 1 tablet (400 mg) by mouth 3 times daily for 7 days.  Qty: 21 tablet, Refills: 0    Associated Diagnoses: Genital herpes simplex, unspecified site           CONTINUE these medications which have NOT CHANGED    Details   acetaminophen (TYLENOL) 325 MG tablet Take 325 mg by mouth every 6 hours as needed for mild pain.      albuterol (PROAIR HFA/PROVENTIL HFA/VENTOLIN HFA) 108 (90 Base) MCG/ACT inhaler Inhale 2 puffs into the lungs daily as needed for shortness of breath.      amLODIPine (NORVASC) 5 MG tablet Take 1 tablet (5 mg) by mouth daily  Qty: 90 tablet, Refills: 3    Associated Diagnoses: Essential hypertension      aspirin (ASPIRIN LOW DOSE) 81 MG chewable tablet CHEW AND SWALLOW 1 TABLET BY MOUTH DAILY  Qty: 90 tablet, Refills: 1    Associated Diagnoses: Essential hypertension      atorvastatin  (LIPITOR) 40 MG tablet TAKE 1 TABLET BY MOUTH AT BEDTIME  Qty: 90 tablet, Refills: 2    Associated Diagnoses: Hyperlipidemia, unspecified hyperlipidemia type      Cholecalciferol (VITAMIN D3) 50 MCG (2000 UT) CAPS TAKE 1 CAPSULE BY MOUTH DAILY  Qty: 90 capsule, Refills: 0    Associated Diagnoses: Vitamin D deficiency      !! Continuous Glucose Sensor (FREESTYLE HANK 2 PLUS SENSOR) MISC 1 each every 14 days.  Qty: 2 each, Refills: 5    Associated Diagnoses: Type 2 diabetes mellitus with hyperglycemia, with long-term current use of insulin (H)      empagliflozin (JARDIANCE) 25 MG TABS tablet Take 1 tablet (25 mg) by mouth daily  Qty: 90 tablet, Refills: 3    Associated Diagnoses: Type 2 diabetes, HbA1c goal < 7% (H)      insulin aspart (NOVOLOG VIAL) 100 UNITS/ML vial TO BE USED WITH INSULIN PUMP, MAX DAILY UNITS 70  Qty: 30 mL, Refills: 3    Associated Diagnoses: Type 2 diabetes mellitus with hyperglycemia, with long-term current use of insulin (H)      insulin glargine U-300 (TOUJEO SOLOSTAR) 300 UNIT/ML (1 units dial) pen Inject 20 Units Subcutaneous At Bedtime  Qty: 9 mL, Refills: 1      INSULIN PUMP - OUTPATIENT Insulin pump  Omnipod Dash  Date: 1/23/25  Basal: 1.3 (new)  Total basal: 31.2   CR:15  ISF: 50  BG target: 120  BG correction: 130  Active insulin: 4 hours    Associated Diagnoses: Type 2 diabetes mellitus with hyperglycemia, with long-term current use of insulin (H)      ipratropium-albuterol (COMBIVENT RESPIMAT)  MCG/ACT inhaler INHALE 1 PUFF INTO THE LUNGS 4 TIMES DAILY  Qty: 4 g, Refills: 2    Associated Diagnoses: Pulmonary emphysema, unspecified emphysema type (H)      ketoconazole (NIZORAL) 2 % external cream Apply topically daily  Qty: 60 g, Refills: 1    Associated Diagnoses: Seborrheic dermatitis      lisinopril (ZESTRIL) 40 MG tablet TAKE 1 TABLET BY MOUTH DAILY  Qty: 90 tablet, Refills: 1    Associated Diagnoses: Essential hypertension      primidone (MYSOLINE) 50 MG tablet TAKE 1  TABLET BY MOUTH AT BEDTIME  Qty: 30 tablet, Refills: 0    Associated Diagnoses: Essential tremor      tiotropium-olodaterol 2.5-2.5 MCG/ACT AERS Inhale 2 puffs into the lungs daily      triamcinolone (KENALOG) 0.1 % external ointment Apply topically 2 times daily as needed (foot irritation).      blood glucose (ONETOUCH ULTRA) test strip USE TO TEST BLOOD SUGAR 4 TIMES DAILY  Qty: 100 strip, Refills: 4    Associated Diagnoses: Type 2 diabetes mellitus with hyperglycemia, with long-term current use of insulin (H)      !! Continuous Glucose Sensor (DEXCOM G7 SENSOR) MISC 1 each every 10 days. Change sensor every 10 days  Qty: 9 each, Refills: 5    Associated Diagnoses: Type 2 diabetes mellitus with hyperglycemia, with long-term current use of insulin (H)      !! Insulin Disposable Pump (OMNIPOD 5 LIBRE2 PLUS G6 PODS) MISC 1 each every 72 hours.  Qty: 10 each, Refills: 5    Associated Diagnoses: Type 2 diabetes mellitus with hyperglycemia, with long-term current use of insulin (H)      Insulin Disposable Pump (OMNIPOD 5 LIBRE2 PLUS G6) KIT 1 each every 72 hours.  Qty: 1 kit, Refills: 0    Associated Diagnoses: Type 2 diabetes mellitus with hyperglycemia, with long-term current use of insulin (H)      !! Insulin Disposable Pump (OMNIPOD DASH PODS, GEN 4,) MISC Inject 1 pod subcutaneously every 48 hours.  Qty: 15 each, Refills: 5    Associated Diagnoses: Type 2 diabetes mellitus with hyperglycemia, with long-term current use of insulin (H)      insulin pen needle (32G X 4 MM) 32G X 4 MM miscellaneous Use 1 pen needles daily  Qty: 100 each, Refills: 3    Associated Diagnoses: Type 2 diabetes mellitus with hyperglycemia, without long-term current use of insulin (H)      OneTouch Delica Lancets 33G MISC Inject 1 each Subcutaneous 3 times daily (before meals)  Qty: 100 each, Refills: 11    Comments: Do you deliver?   Please call patient ([phone]) to let them know.  Thanks  Associated Diagnoses: Type 2 diabetes mellitus  with hyperglycemia, with long-term current use of insulin (H)      order for DME Women briefs  Qty: 50 each, Refills: 1    Associated Diagnoses: Female stress incontinence       !! - Potential duplicate medications found. Please discuss with provider.        Allergies   No Known Allergies  Data   Most Recent 3 CBC's:  Recent Labs   Lab Test 03/20/25  0604 03/19/25  0518 03/18/25  1209   WBC 13.7* 14.9* 15.9*   HGB 17.5* 16.6* 18.7*   MCV 96 95 92    192 236      Most Recent 3 BMP's:  Recent Labs   Lab Test 03/20/25  0709 03/20/25  0604 03/20/25  0301 03/19/25  0711 03/19/25  0518 03/18/25  1352 03/18/25  1209   NA  --  141  --   --  142  --  136   POTASSIUM  --  4.3  --   --  4.3  --  5.0   CHLORIDE  --  104  --   --  106  --  95*   CO2  --  24  --   --  25  --  27   BUN  --  24.6*  --   --  18.5  --  21.5   CR  --  1.09*  --   --  1.08*  --  1.37*   ANIONGAP  --  13  --   --  11  --  14   NORM  --  9.4  --   --  8.9  --  9.3   * 173* 177*   < > 162*   < > 497*    < > = values in this interval not displayed.     Most Recent 2 LFT's:  Recent Labs   Lab Test 03/18/25  1209 03/17/25  2048   AST 13 17   ALT 10 13   ALKPHOS 122 147   BILITOTAL 1.0 0.8     Most Recent INR's and Anticoagulation Dosing History:  Anticoagulation Dose History           No data to display              Most Recent 3 Troponin's:No lab results found.  Most Recent Cholesterol Panel:  Recent Labs   Lab Test 05/16/24  1313   CHOL 173   LDL 89   HDL 59   TRIG 127     Most Recent 6 Bacteria Isolates From Any Culture (See EPIC Reports for Culture Details):No lab results found.  Most Recent TSH, T4 and A1c Labs:  Recent Labs   Lab Test 03/18/25  1209 12/05/24  1429   TSH 1.18  --    A1C  --  9.6*     Results for orders placed or performed during the hospital encounter of 03/18/25   XR Chest Port 1 View    Narrative    PROCEDURE:  XR CHEST PORT 1 VIEW    HISTORY: Hypoxia. .    COMPARISON:  3/17/25    FINDINGS:    The cardiomediastinal  contours are stable. There is calcific aortic  atherosclerosis.   No focal consolidation, effusion or pneumothorax.      Impression    IMPRESSION:  Stable chest.      JONATHAN KUMAR MD         SYSTEM ID:  [id number]

## 2025-03-20 NOTE — PLAN OF CARE
Goal Outcome Evaluation:patient does have a red raw reji area, very painful to the touch, provider was able to visualize the affected area.

## 2025-03-20 NOTE — PLAN OF CARE
Goal Outcome Evaluation:      Plan of Care Reviewed With: patient    Overall Patient Progress: improvingOverall Patient Progress: improving    Pt is alert and oriented. Denies pain. BG range from 177-300. Insulin given. Weaned pt down to 1L. Slept well. Highest temp was 100.2 at beginning of shift and then decreased as shift went on. Lowest temp 99.2 rest of VS and assessment as charted. Call light in reach, uses appropriately, alarms active.

## 2025-03-20 NOTE — PLAN OF CARE
1ST NITRO GIVEN /86 HR 76. WILL RE CHECK IN 5 MINUTES. Goal Outcome Evaluation:Patient discharged at 5:10 PM via wheel chair accompanied by  staff. Prescriptions sent to patients preferred pharmacy. All belongings sent with patient.     Discharge instructions reviewed with patient. Listed belongings gathered and returned to patient. yes    Patient discharged to home.   Report called to N/A    Surgical Patient   Surgical Procedures during stay: N/A  Did patient receive discharge instruction on wound care and recognition of infection symptoms? N/A    MISC  Follow up appointment made:  Yes  Home medications returned to patient: Yes  Patient reports pain was well managed at discharge: Yes

## 2025-03-20 NOTE — PHARMACY-MEDICATION REGIMEN REVIEW
Pharmacy Antimicrobial Stewardship Assessment     Current Antimicrobial Therapy:  Anti-infectives (From now, onward)      Start     Dose/Rate Route Frequency Ordered Stop    25 0930  acyclovir (ZOVIRAX) tablet 400 mg         400 mg Oral 3 TIMES DAILY 25 0914              Indication: Herpes Simplex Infection    Days of Therapy: 1     Pertinent Labs:    Recent Labs   Lab Test 25  0604 25  0518 25  1209 25  2327 25  2042 24  1429 11/15/23  1400   WBC 13.7* 14.9* 15.9* 16.7* 16.7* 8.0 8.4       Recent Labs   Lab Test 25   PCAL 0.11        Temperature:  Temp (24hrs), Av.8  F (37.7  C), Min:98.9  F (37.2  C), Max:100.5  F (38.1  C)      Culture Results:   30-Day Micro Results       Collected Updated Procedure Result Status      2025 0619 Blood Culture Arm, Left [[id number]]   Blood from Arm, Left    Preliminary result Component Value   Culture No growth after 2 days  [P]                2025 211 Influenza A/B, RSV and SARS-CoV2 PCR (COVID-19) Nasopharyngeal [[id number]]    Swab from Nasopharyngeal    Final result Component Value   Influenza A PCR Negative   Influenza B PCR Negative   RSV PCR Negative   SARS CoV2 PCR Negative   NEGATIVE: SARS-CoV-2 (COVID-19) RNA not detected, presumed negative.                       Recommendations/Interventions:  No recommendations at this time. Will continue to monitor.     Eve Ott RPH  2025

## 2025-03-21 LAB
C PEPTIDE SERPL-MCNC: 0.8 NG/ML (ref 0.9–6.9)
FASTING STATUS PATIENT QL REPORTED: ABNORMAL

## 2025-03-22 ENCOUNTER — APPOINTMENT (OUTPATIENT)
Dept: CT IMAGING | Facility: HOSPITAL | Age: 60
DRG: 870 | End: 2025-03-22
Attending: NURSE PRACTITIONER
Payer: COMMERCIAL

## 2025-03-22 ENCOUNTER — HOSPITAL ENCOUNTER (INPATIENT)
Facility: HOSPITAL | Age: 60
DRG: 870 | End: 2025-03-22
Attending: NURSE PRACTITIONER | Admitting: HOSPITALIST
Payer: COMMERCIAL

## 2025-03-22 ENCOUNTER — APPOINTMENT (OUTPATIENT)
Dept: GENERAL RADIOLOGY | Facility: HOSPITAL | Age: 60
DRG: 870 | End: 2025-03-22
Attending: NURSE PRACTITIONER
Payer: COMMERCIAL

## 2025-03-22 DIAGNOSIS — I26.93 SINGLE SUBSEGMENTAL PULMONARY EMBOLISM WITHOUT ACUTE COR PULMONALE (H): ICD-10-CM

## 2025-03-22 DIAGNOSIS — I48.0 PAF (PAROXYSMAL ATRIAL FIBRILLATION) (H): ICD-10-CM

## 2025-03-22 DIAGNOSIS — I48.91 ATRIAL FIBRILLATION, UNSPECIFIED TYPE (H): ICD-10-CM

## 2025-03-22 DIAGNOSIS — J18.9 PNEUMONIA: ICD-10-CM

## 2025-03-22 DIAGNOSIS — E11.10 DIABETIC KETOACIDOSIS WITHOUT COMA ASSOCIATED WITH TYPE 2 DIABETES MELLITUS (H): Primary | ICD-10-CM

## 2025-03-22 DIAGNOSIS — R73.9 HYPERGLYCEMIA: ICD-10-CM

## 2025-03-22 DIAGNOSIS — E11.10 DIABETIC KETOSIS (H): ICD-10-CM

## 2025-03-22 DIAGNOSIS — J96.21 ACUTE AND CHRONIC RESPIRATORY FAILURE WITH HYPOXIA (H): ICD-10-CM

## 2025-03-22 DIAGNOSIS — E08.42 DIABETIC POLYNEUROPATHY ASSOCIATED WITH DIABETES MELLITUS DUE TO UNDERLYING CONDITION (H): ICD-10-CM

## 2025-03-22 LAB
ALBUMIN SERPL BCG-MCNC: 2.6 G/DL (ref 3.5–5.2)
ALBUMIN UR-MCNC: 10 MG/DL
ALP SERPL-CCNC: 111 U/L (ref 40–150)
ALT SERPL W P-5'-P-CCNC: 10 U/L (ref 0–50)
AMORPH CRY #/AREA URNS HPF: ABNORMAL /HPF
ANION GAP SERPL CALCULATED.3IONS-SCNC: 17 MMOL/L (ref 7–15)
APPEARANCE UR: CLEAR
AST SERPL W P-5'-P-CCNC: 21 U/L (ref 0–45)
B-OH-BUTYR SERPL-SCNC: 1.16 MMOL/L
B-OH-BUTYR SERPL-SCNC: 1.89 MMOL/L
BACTERIA #/AREA URNS HPF: ABNORMAL /HPF
BASE EXCESS BLDV CALC-SCNC: -1.5 MMOL/L (ref -3–3)
BILIRUB SERPL-MCNC: 0.6 MG/DL
BILIRUB UR QL STRIP: NEGATIVE
BUN SERPL-MCNC: 48.1 MG/DL (ref 8–23)
CALCIUM SERPL-MCNC: 9 MG/DL (ref 8.8–10.4)
CHLORIDE SERPL-SCNC: 96 MMOL/L (ref 98–107)
COLOR UR AUTO: ABNORMAL
CREAT SERPL-MCNC: 1.32 MG/DL (ref 0.51–0.95)
EGFRCR SERPLBLD CKD-EPI 2021: 46 ML/MIN/1.73M2
ERYTHROCYTE [DISTWIDTH] IN BLOOD BY AUTOMATED COUNT: 14.4 % (ref 10–15)
EST. AVERAGE GLUCOSE BLD GHB EST-MCNC: 318 MG/DL
FLUAV RNA SPEC QL NAA+PROBE: NEGATIVE
FLUBV RNA RESP QL NAA+PROBE: NEGATIVE
GAD65 AB SER IA-ACNC: <5 IU/ML
GLUCOSE BLDC GLUCOMTR-MCNC: 299 MG/DL (ref 70–99)
GLUCOSE BLDC GLUCOMTR-MCNC: 318 MG/DL (ref 70–99)
GLUCOSE BLDC GLUCOMTR-MCNC: 395 MG/DL (ref 70–99)
GLUCOSE BLDC GLUCOMTR-MCNC: 409 MG/DL (ref 70–99)
GLUCOSE SERPL-MCNC: 337 MG/DL (ref 70–99)
GLUCOSE SERPL-MCNC: 460 MG/DL (ref 70–99)
GLUCOSE UR STRIP-MCNC: >1000 MG/DL
HBA1C MFR BLD: 12.7 %
HCO3 BLDV-SCNC: 23 MMOL/L (ref 21–28)
HCO3 SERPL-SCNC: 21 MMOL/L (ref 22–29)
HCT VFR BLD AUTO: 51 % (ref 35–47)
HGB BLD-MCNC: 16.8 G/DL (ref 11.7–15.7)
HGB UR QL STRIP: ABNORMAL
HOLD SPECIMEN: NORMAL
HOLD SPECIMEN: NORMAL
KETONES UR STRIP-MCNC: ABNORMAL MG/DL
LACTATE SERPL-SCNC: 1.8 MMOL/L (ref 0.7–2)
LEUKOCYTE ESTERASE UR QL STRIP: NEGATIVE
MAGNESIUM SERPL-MCNC: 2 MG/DL (ref 1.7–2.3)
MCH RBC QN AUTO: 30.9 PG (ref 26.5–33)
MCHC RBC AUTO-ENTMCNC: 32.9 G/DL (ref 31.5–36.5)
MCV RBC AUTO: 94 FL (ref 78–100)
MUCOUS THREADS #/AREA URNS LPF: PRESENT /LPF
NITRATE UR QL: NEGATIVE
O2/TOTAL GAS SETTING VFR VENT: 28 %
OXYHGB MFR BLDV: 87 % (ref 70–75)
PCO2 BLDV: 38 MM HG (ref 40–50)
PH BLDV: 7.39 [PH] (ref 7.32–7.43)
PH UR STRIP: 5.5 [PH] (ref 4.7–8)
PLATELET # BLD AUTO: 260 10E3/UL (ref 150–450)
PO2 BLDV: 56 MM HG (ref 25–47)
POTASSIUM SERPL-SCNC: 4.3 MMOL/L (ref 3.4–5.3)
POTASSIUM SERPL-SCNC: 4.5 MMOL/L (ref 3.4–5.3)
PROCALCITONIN SERPL IA-MCNC: 3.23 NG/ML
PROT SERPL-MCNC: 6.6 G/DL (ref 6.4–8.3)
RADIOLOGIST FLAGS: ABNORMAL
RBC # BLD AUTO: 5.44 10E6/UL (ref 3.8–5.2)
RBC URINE: 3 /HPF
RSV RNA SPEC NAA+PROBE: NEGATIVE
SAO2 % BLDV: 89.5 % (ref 70–75)
SARS-COV-2 RNA RESP QL NAA+PROBE: NEGATIVE
SODIUM SERPL-SCNC: 134 MMOL/L (ref 135–145)
SP GR UR STRIP: 1.01 (ref 1–1.03)
SQUAMOUS EPITHELIAL: 0 /HPF
TROPONIN T SERPL HS-MCNC: 18 NG/L
TROPONIN T SERPL HS-MCNC: 18 NG/L
UROBILINOGEN UR STRIP-MCNC: NORMAL MG/DL
WBC # BLD AUTO: 16.4 10E3/UL (ref 4–11)
WBC URINE: 1 /HPF

## 2025-03-22 PROCEDURE — 93010 ELECTROCARDIOGRAM REPORT: CPT | Performed by: INTERNAL MEDICINE

## 2025-03-22 PROCEDURE — 82805 BLOOD GASES W/O2 SATURATION: CPT | Performed by: NURSE PRACTITIONER

## 2025-03-22 PROCEDURE — 83036 HEMOGLOBIN GLYCOSYLATED A1C: CPT | Performed by: HOSPITALIST

## 2025-03-22 PROCEDURE — 82962 GLUCOSE BLOOD TEST: CPT

## 2025-03-22 PROCEDURE — 82010 KETONE BODYS QUAN: CPT | Performed by: HOSPITALIST

## 2025-03-22 PROCEDURE — 87637 SARSCOV2&INF A&B&RSV AMP PRB: CPT | Performed by: NURSE PRACTITIONER

## 2025-03-22 PROCEDURE — 83605 ASSAY OF LACTIC ACID: CPT | Performed by: NURSE PRACTITIONER

## 2025-03-22 PROCEDURE — 84145 PROCALCITONIN (PCT): CPT | Performed by: NURSE PRACTITIONER

## 2025-03-22 PROCEDURE — 250N000011 HC RX IP 250 OP 636: Performed by: NURSE PRACTITIONER

## 2025-03-22 PROCEDURE — 250N000009 HC RX 250: Performed by: HOSPITALIST

## 2025-03-22 PROCEDURE — 99291 CRITICAL CARE FIRST HOUR: CPT | Mod: 25

## 2025-03-22 PROCEDURE — 258N000003 HC RX IP 258 OP 636: Performed by: NURSE PRACTITIONER

## 2025-03-22 PROCEDURE — 96365 THER/PROPH/DIAG IV INF INIT: CPT

## 2025-03-22 PROCEDURE — 87040 BLOOD CULTURE FOR BACTERIA: CPT | Performed by: NURSE PRACTITIONER

## 2025-03-22 PROCEDURE — 999N000157 HC STATISTIC RCP TIME EA 10 MIN

## 2025-03-22 PROCEDURE — 96361 HYDRATE IV INFUSION ADD-ON: CPT

## 2025-03-22 PROCEDURE — 81001 URINALYSIS AUTO W/SCOPE: CPT | Performed by: NURSE PRACTITIONER

## 2025-03-22 PROCEDURE — 85027 COMPLETE CBC AUTOMATED: CPT | Performed by: NURSE PRACTITIONER

## 2025-03-22 PROCEDURE — 99285 EMERGENCY DEPT VISIT HI MDM: CPT | Performed by: NURSE PRACTITIONER

## 2025-03-22 PROCEDURE — 71046 X-RAY EXAM CHEST 2 VIEWS: CPT

## 2025-03-22 PROCEDURE — 250N000009 HC RX 250: Performed by: NURSE PRACTITIONER

## 2025-03-22 PROCEDURE — 36415 COLL VENOUS BLD VENIPUNCTURE: CPT | Performed by: NURSE PRACTITIONER

## 2025-03-22 PROCEDURE — 83735 ASSAY OF MAGNESIUM: CPT | Performed by: NURSE PRACTITIONER

## 2025-03-22 PROCEDURE — 71275 CT ANGIOGRAPHY CHEST: CPT

## 2025-03-22 PROCEDURE — 200N000001 HC R&B ICU

## 2025-03-22 PROCEDURE — 99223 1ST HOSP IP/OBS HIGH 75: CPT | Mod: AI | Performed by: HOSPITALIST

## 2025-03-22 PROCEDURE — 82010 KETONE BODYS QUAN: CPT | Performed by: NURSE PRACTITIONER

## 2025-03-22 PROCEDURE — 82947 ASSAY GLUCOSE BLOOD QUANT: CPT | Performed by: HOSPITALIST

## 2025-03-22 PROCEDURE — 250N000011 HC RX IP 250 OP 636: Performed by: HOSPITALIST

## 2025-03-22 PROCEDURE — 93005 ELECTROCARDIOGRAM TRACING: CPT | Mod: 76

## 2025-03-22 PROCEDURE — 84484 ASSAY OF TROPONIN QUANT: CPT | Performed by: NURSE PRACTITIONER

## 2025-03-22 PROCEDURE — 80053 COMPREHEN METABOLIC PANEL: CPT | Performed by: NURSE PRACTITIONER

## 2025-03-22 PROCEDURE — 96375 TX/PRO/DX INJ NEW DRUG ADDON: CPT

## 2025-03-22 PROCEDURE — 250N000012 HC RX MED GY IP 250 OP 636 PS 637: Performed by: NURSE PRACTITIONER

## 2025-03-22 PROCEDURE — 94640 AIRWAY INHALATION TREATMENT: CPT

## 2025-03-22 RX ORDER — ENOXAPARIN SODIUM 100 MG/ML
1 INJECTION SUBCUTANEOUS ONCE
Status: COMPLETED | OUTPATIENT
Start: 2025-03-22 | End: 2025-03-22

## 2025-03-22 RX ORDER — ENOXAPARIN SODIUM 100 MG/ML
0.75 INJECTION SUBCUTANEOUS EVERY 12 HOURS
Status: DISCONTINUED | OUTPATIENT
Start: 2025-03-23 | End: 2025-03-29

## 2025-03-22 RX ORDER — AZITHROMYCIN 500 MG/5ML
500 INJECTION, POWDER, LYOPHILIZED, FOR SOLUTION INTRAVENOUS EVERY 24 HOURS
Status: DISCONTINUED | OUTPATIENT
Start: 2025-03-22 | End: 2025-03-22

## 2025-03-22 RX ORDER — CEFTRIAXONE SODIUM 1 G/50ML
1 INJECTION, SOLUTION INTRAVENOUS ONCE
Status: DISCONTINUED | OUTPATIENT
Start: 2025-03-22 | End: 2025-03-22

## 2025-03-22 RX ORDER — DEXTROSE MONOHYDRATE 25 G/50ML
25-50 INJECTION, SOLUTION INTRAVENOUS
Status: DISCONTINUED | OUTPATIENT
Start: 2025-03-22 | End: 2025-03-23

## 2025-03-22 RX ORDER — NICOTINE POLACRILEX 4 MG
15-30 LOZENGE BUCCAL
Status: DISCONTINUED | OUTPATIENT
Start: 2025-03-22 | End: 2025-03-23

## 2025-03-22 RX ORDER — LEVALBUTEROL INHALATION SOLUTION 0.63 MG/3ML
0.63 SOLUTION RESPIRATORY (INHALATION) ONCE
Status: COMPLETED | OUTPATIENT
Start: 2025-03-22 | End: 2025-03-22

## 2025-03-22 RX ORDER — PIPERACILLIN SODIUM, TAZOBACTAM SODIUM 3; .375 G/15ML; G/15ML
3.38 INJECTION, POWDER, LYOPHILIZED, FOR SOLUTION INTRAVENOUS EVERY 6 HOURS
Status: DISCONTINUED | OUTPATIENT
Start: 2025-03-23 | End: 2025-03-23

## 2025-03-22 RX ORDER — SODIUM CHLORIDE AND POTASSIUM CHLORIDE 150; 450 MG/100ML; MG/100ML
INJECTION, SOLUTION INTRAVENOUS CONTINUOUS
Status: DISCONTINUED | OUTPATIENT
Start: 2025-03-22 | End: 2025-03-23

## 2025-03-22 RX ORDER — DILTIAZEM HCL/D5W 125 MG/125
5-15 PLASTIC BAG, INJECTION (ML) INTRAVENOUS CONTINUOUS
Status: DISCONTINUED | OUTPATIENT
Start: 2025-03-23 | End: 2025-03-23

## 2025-03-22 RX ORDER — IOPAMIDOL 755 MG/ML
66 INJECTION, SOLUTION INTRAVASCULAR ONCE
Status: COMPLETED | OUTPATIENT
Start: 2025-03-22 | End: 2025-03-22

## 2025-03-22 RX ORDER — PIPERACILLIN SODIUM, TAZOBACTAM SODIUM 4; .5 G/20ML; G/20ML
4.5 INJECTION, POWDER, LYOPHILIZED, FOR SOLUTION INTRAVENOUS ONCE
Status: COMPLETED | OUTPATIENT
Start: 2025-03-22 | End: 2025-03-22

## 2025-03-22 RX ADMIN — INSULIN HUMAN: 1 INJECTION, SOLUTION INTRAVENOUS at 23:13

## 2025-03-22 RX ADMIN — IOPAMIDOL 66 ML: 755 INJECTION, SOLUTION INTRAVENOUS at 21:10

## 2025-03-22 RX ADMIN — ENOXAPARIN SODIUM 90 MG: 100 INJECTION SUBCUTANEOUS at 22:32

## 2025-03-22 RX ADMIN — PIPERACILLIN AND TAZOBACTAM 4.5 G: 4; .5 INJECTION, POWDER, FOR SOLUTION INTRAVENOUS at 21:15

## 2025-03-22 RX ADMIN — HUMAN INSULIN 5 UNITS: 100 INJECTION, SOLUTION SUBCUTANEOUS at 20:04

## 2025-03-22 RX ADMIN — LEVALBUTEROL HYDROCHLORIDE 0.63 MG: 0.63 SOLUTION RESPIRATORY (INHALATION) at 19:49

## 2025-03-22 RX ADMIN — POTASSIUM CHLORIDE AND SODIUM CHLORIDE: 450; 150 INJECTION, SOLUTION INTRAVENOUS at 23:11

## 2025-03-22 RX ADMIN — SODIUM CHLORIDE, SODIUM LACTATE, POTASSIUM CHLORIDE, AND CALCIUM CHLORIDE 1000 ML: .6; .31; .03; .02 INJECTION, SOLUTION INTRAVENOUS at 19:35

## 2025-03-22 RX ADMIN — Medication 5 MG/HR: at 23:49

## 2025-03-22 ASSESSMENT — ENCOUNTER SYMPTOMS
EYES NEGATIVE: 1
PSYCHIATRIC NEGATIVE: 1
GASTROINTESTINAL NEGATIVE: 1
HEMATOLOGIC/LYMPHATIC NEGATIVE: 1
NEUROLOGICAL NEGATIVE: 1
ALLERGIC/IMMUNOLOGIC NEGATIVE: 1
ENDOCRINE NEGATIVE: 1
CARDIOVASCULAR NEGATIVE: 1
SHORTNESS OF BREATH: 1
MUSCULOSKELETAL NEGATIVE: 1
CONSTITUTIONAL NEGATIVE: 1

## 2025-03-22 ASSESSMENT — ACTIVITIES OF DAILY LIVING (ADL)
ADLS_ACUITY_SCORE: 58

## 2025-03-22 NOTE — ED PROVIDER NOTES
History     Chief Complaint   Patient presents with    Hyperglycemia    Shortness of Breath     HPI  Marly Cannon is a 59 year old individual with history of DMT2, stage III chronic kidney disease, pulmonary emphysema, hyperparathyroidism, tobacco abuse, memory disturbance/intellectual disability, comes in by EMS for hyperglycemia.  Patient was just discharged on 3/20/2025 for hyperglycemia and inability to care for self.  Comes back in with elevated glucose levels.  Patient is supposed to be on OmniPod insulin pump is attached on arrival on abdomen.  Patient is poor historian due to intellectual disability.    Allergies:  No Known Allergies    Problem List:    Patient Active Problem List    Diagnosis Date Noted    PAF (paroxysmal atrial fibrillation) (H) 03/22/2025     Priority: Medium    Pneumonia 03/22/2025     Priority: Medium    Hyperglycemia 03/22/2025     Priority: Medium    Acute and chronic respiratory failure with hypoxia (H) 03/22/2025     Priority: Medium    Diabetic ketosis (H) 03/18/2025     Priority: Medium    Unable to care for self 03/18/2025     Priority: Medium    Diabetic polyneuropathy associated with diabetes mellitus due to underlying condition (H) 01/17/2023     Priority: Medium    Urinary incontinence, unspecified type 01/17/2023     Priority: Medium    ADEEL (obstructive sleep apnea) 02/16/2021     Priority: Medium     Interp for PSG dated 12/16/2020.     Weight 202 lbs.  Total sleep time 417.5 minutes, sleep efficiency 83%, sleep latency 15 minutes, REM latency - minutes.  Arousal index 45.6.  Sleep architecture was incredibly fragmented with no clear REM observed.     AHI 43.1, events appearing primarily obstructive in nature.  Mean Spo2 86%, isabel SpO2 78%, 96.2% of recording was <= 89%.       EKG appeared NSR.     No PLM's observed.      Unable to assess for phasic or tonic EMG activity in REM.  No abnormal nocturnal behaviors observed.     Assessment:  - Severe ADEEL with severe  sleep-associated hypoxemia and sustained hypoxemia, with increased concern for hypoventilation.  - Potential that severity under-reported given highly fragmented sleep architecture and no clear REM observed.      Memory disturbance 02/13/2020     Priority: Medium    Callus of foot 05/29/2019     Priority: Medium    Hyperlipidemia, unspecified hyperlipidemia type 05/28/2019     Priority: Medium    Essential hypertension 05/28/2019     Priority: Medium    H/O colonoscopy 04/24/2019     Priority: Medium     Had in 10/2018, due for repeat 5 years      [place] cardiac risk <10% in next 10 years 02/22/2019     Priority: Medium     Overview:   Started high intensity statin.       Tubular adenoma of colon 09/28/2018     Priority: Medium    Intellectual disability 08/01/2017     Priority: Medium    ACP (advance care planning) 09/09/2016     Priority: Medium     Advance Care Planning 9/9/2016: ACP Review of Chart / Resources Provided:  Reviewed chart for advance care plan.  Marly Cannon has been provided information and resources to begin or update their advance care plan.  Added by Naty Allen            Hyperparathyroidism due to renal insufficiency 06/14/2016     Priority: Medium    Vitamin D deficiency 06/14/2016     Priority: Medium    Microalbuminuria due to type 2 diabetes mellitus (H) 06/14/2016     Priority: Medium    Stage 3 chronic kidney disease (H) 02/12/2016     Priority: Medium     Overview:   Updated per 10/1/17 IMO import      Pulmonary emphysema (H) 08/21/2015     Priority: Medium     Confirmed via PFTs in 8/2015      Type 2 diabetes, HbA1c goal < 7% (H) 07/24/2015     Priority: Medium    Tobacco abuse 07/09/2015     Priority: Medium    Tremor 11/09/2009     Priority: Medium     Formatting of this note might be different from the original.  Shakes head      Breast fibrocystic disorder 02/22/2008     Priority: Medium     Overview:   IMO Update 10/11          Past Medical History:    History  reviewed. No pertinent past medical history.    Past Surgical History:    Past Surgical History:   Procedure Laterality Date    BIOPSY BREAST Left 02/14/2008    patient states no bx, Clip in left breast/ stereo bxc 2-- no path in EPIC that far back    COLONOSCOPY N/A 08/20/2015    Procedure: COLONOSCOPY;  Surgeon: Gentry Porter MD;  Location: [facility]    COLONOSCOPY N/A 09/21/2018    Procedure: COLONOSCOPY;  COLONOSCOPY WITH POLYPECTOMY;  Surgeon: Gentry Porter MD;  Location: [facility]       Family History:    Family History   Problem Relation Age of Onset    Diabetes Mother     Diabetes Father     Hypertension Brother     Diabetes Sister        Social History:  Marital Status:   [5]  Social History     Tobacco Use    Smoking status: Former     Current packs/day: 1.00     Average packs/day: 1 pack/day for 46.2 years (46.2 ttl pk-yrs)     Types: Cigarettes     Start date: 1/1/1979     Passive exposure: Current    Smokeless tobacco: Never    Tobacco comments:     Declined QP 05/13/2020   Vaping Use    Vaping status: Never Used   Substance Use Topics    Alcohol use: No     Alcohol/week: 0.0 standard drinks of alcohol    Drug use: No        Medications:    acetaminophen (TYLENOL) 325 MG tablet  acyclovir (ZOVIRAX) 400 MG tablet  albuterol (PROAIR HFA/PROVENTIL HFA/VENTOLIN HFA) 108 (90 Base) MCG/ACT inhaler  amLODIPine (NORVASC) 5 MG tablet  aspirin (ASPIRIN LOW DOSE) 81 MG chewable tablet  atorvastatin (LIPITOR) 40 MG tablet  blood glucose (ONETOUCH ULTRA) test strip  Cholecalciferol (VITAMIN D3) 50 MCG (2000 UT) CAPS  Continuous Glucose Sensor (DEXCOM G7 SENSOR) MISC  Continuous Glucose Sensor (FREESTYLE HANK 2 PLUS SENSOR) MISC  empagliflozin (JARDIANCE) 25 MG TABS tablet  insulin aspart (NOVOLOG VIAL) 100 UNITS/ML vial  Insulin Disposable Pump (OMNIPOD 5 LIBRE2 PLUS G6 PODS) MISC  Insulin Disposable Pump (OMNIPOD 5 LIBRE2 PLUS G6) KIT  Insulin Disposable Pump (OMNIPOD DASH PODS, GEN 4,)  MISC  insulin glargine U-300 (TOUJEO SOLOSTAR) 300 UNIT/ML (1 units dial) pen  insulin pen needle (32G X 4 MM) 32G X 4 MM miscellaneous  INSULIN PUMP - OUTPATIENT  ipratropium-albuterol (COMBIVENT RESPIMAT)  MCG/ACT inhaler  ketoconazole (NIZORAL) 2 % external cream  lisinopril (ZESTRIL) 40 MG tablet  OneTouch Delica Lancets 33G MISC  order for DME  primidone (MYSOLINE) 50 MG tablet  tiotropium-olodaterol 2.5-2.5 MCG/ACT AERS  triamcinolone (KENALOG) 0.1 % external ointment          Review of Systems   Constitutional: Negative.    HENT: Negative.     Eyes: Negative.    Respiratory:  Positive for shortness of breath.    Cardiovascular: Negative.    Gastrointestinal: Negative.    Endocrine: Negative.    Genitourinary: Negative.    Musculoskeletal: Negative.    Skin: Negative.    Allergic/Immunologic: Negative.    Neurological: Negative.    Hematological: Negative.    Psychiatric/Behavioral: Negative.         Physical Exam   BP: (!) 144/86  Pulse: 104  Temp: 97.8  F (36.6  C)  Resp: 22  SpO2: (!) 89 %      GENERAL APPEARANCE:  The patient is a 59 year old well-developed, well-nourished individual that appears as stated age.  NECK:  Supple.  Trachea is midline.  CHEST:  Symmetric.  Non-tender to palpation.  No crepitus or deformity.  LUNGS: Breathing is mildly labored.  Lung sounds are dim throughout.  HEART:  Regular rate and rhythm with normal S1 and S2.  No murmurs, gallops, or rubs.  : Assessment completed with andrew Hughes RN.  Patient with lesions to external genitalia that are reddened.  In vaginal canal there are white open lesions present.    ABDOMEN:  Soft.  No mass, tenderness, guarding, or rebound.  No organomegaly or hernia.  Bowel sounds are present.  No CVA tenderness or flank mass.  No abdominal bruits or thrills present upon auscultation/palpation.  PSYCHIATRIC:  The patient is awake, alert.  Appropriate mood and affect.  Calm and cooperative with history and physical exam.  SKIN:   Warm, dry, and well perfused.  Good turgor.  No lesions, nodules, or rashes are noted.  No bruising noted.      ED Course     ED Course as of 03/22/25 2233   Sat Mar 22, 2025   1710 Labs ordered.   1712 In to see patient and history/physical completed.    1828 Ketone Quantitative(!!): 1.89  Beta hydroxy butyrate 1.89 which is high critical.   1829 Comprehensive metabolic panel(!)  BUN 48.1 with a creatinine 1.32 and a GFR 46 which is worsened from 2 days prior.   1903 Repeat glucose 390  Novolin 5 units IV ordered.   1912 Patient's heart rhythm went abnormal.  ECG ordered.  Tachycardic in the 130s.  Blood pressures did drop to 70-80s systolically.  1 L LR initiated.  Patient denies any issues but appears more lethargic.   1933 With heart rate still 130s and the SVT.  Blood pressure did come up to 116 systolic.  Patient with weak congested cough.   1934 X-ray does show retrocardiac opacity which could be pneumonia.  Patient having elevation of white count of 16.4, azithromycin and ceftriaxone ordered.   1945 Patient did desat during this time.  Did have significant congestion.  Patient with weak congested cough.  Xopenex nebs ordered.   2040 Discussed case with hospitalist Dr. Martin.  Wants to wait for CT angio to be back before admission.  Recommends doing Zosyn antibiotic instead of azithromycin and ceftriaxone.   2119 Patient's rhythm returned to sinus tachycardia.   2126 Repeat glucose down to 299.   2146 While getting ECG noted sinus tachycardia, patient went into atrial fibrillation with rapid ventricular response in the 150s.   2149 Without any intervention, patient converted back to sinus tachycardia.  Repeat potassium level ordered.   2202 Radiology called and informed there is a very small left lower lobe subsegmental PE.  No heart strain noted.   2217 Discussed paroxysmal atrial fibrillation, PE, glucose levels with hospitalist Dr. Daniels.  Requests Lovenox 1 jean/keg to be conducted.  He will order  insulin drip.  Accepted for ICU admission.               ECG:    ECG competed at 1919 and personally reviewed at 1919 showing SVT with ventricular rate of 132 and QTc of 459.  Normal axis.  Peaked T waves in inferolateral leads present.  Abnormal ECG.  Compared to ECG from 3/18/2025, sinus rhythm is now SVT.  No other significant changes noted.     Repeat ECG completed at 2146 and personally reviewed at 2147 showing atrial fibrillation with rapid ventricular response.  Ventricular rate 154 with a QTc of 467.  Normal axis.  Abnormal ECG.  When compared to ECG from earlier, SVT is now atrial fibrillation with RVR.     Repeat ECG completed at 2153 and personally reviewed at 2154 showing sinus tachycardia with ventricular rate of 109 and QTc of 474.  Possible right atrial abnormality noted.  LVH per voltage criteria noted.  Abnormal ECG.  When compared to ECG from just prior, atrial fibrillation with RVR is now sinus tachycardia.      Results for orders placed or performed during the hospital encounter of 03/22/25 (from the past 24 hours)   Glucose by meter   Result Value Ref Range    GLUCOSE BY METER POCT 409 (H) 70 - 99 mg/dL   Influenza A/B, RSV and SARS-CoV2 PCR (COVID-19) Nasopharyngeal    Specimen: Nasopharyngeal; Swab   Result Value Ref Range    Influenza A PCR Negative Negative    Influenza B PCR Negative Negative    RSV PCR Negative Negative    SARS CoV2 PCR Negative Negative    Narrative    Testing was performed using the Xpert Xpress CoV2/Flu/RSV Assay on the Cepheid GeneXpert Instrument. This test should be ordered for the detection of SARS-CoV2, influenza, and RSV viruses in individuals with signs and symptoms of respiratory tract infection. This test is for in vitro diagnostic use under the [place] FDA for laboratories certified under CLIA to perform high or moderate complexity testing. This test has been [place] FDA cleared. A negative result does not rule out the presence of PCR inhibitors in the specimen or  target RNA in concentration below the limit of detection for the assay. If only one viral target is positive but coinfection with multiple targets is suspected, the sample should be re-tested with another FDA cleared, approved, or authorized test, if coninfection would change clinical management. This test was validated by the [facility]. These laboratories are certified under the Clinical Laboratory Improvement Amendments of 1988 (CLIA-88) as qualified to perfom high complexity laboratory testing.   CBC with platelets   Result Value Ref Range    WBC Count 16.4 (H) 4.0 - 11.0 10e3/uL    RBC Count 5.44 (H) 3.80 - 5.20 10e6/uL    Hemoglobin 16.8 (H) 11.7 - 15.7 g/dL    Hematocrit 51.0 (H) 35.0 - 47.0 %    MCV 94 78 - 100 fL    MCH 30.9 26.5 - 33.0 pg    MCHC 32.9 31.5 - 36.5 g/dL    RDW 14.4 10.0 - 15.0 %    Platelet Count 260 150 - 450 10e3/uL   Comprehensive metabolic panel   Result Value Ref Range    Sodium 134 (L) 135 - 145 mmol/L    Potassium 4.5 3.4 - 5.3 mmol/L    Carbon Dioxide (CO2) 21 (L) 22 - 29 mmol/L    Anion Gap 17 (H) 7 - 15 mmol/L    Urea Nitrogen 48.1 (H) 8.0 - 23.0 mg/dL    Creatinine 1.32 (H) 0.51 - 0.95 mg/dL    GFR Estimate 46 (L) >60 mL/min/1.73m2    Calcium 9.0 8.8 - 10.4 mg/dL    Chloride 96 (L) 98 - 107 mmol/L    Glucose 460 (H) 70 - 99 mg/dL    Alkaline Phosphatase 111 40 - 150 U/L    AST 21 0 - 45 U/L    ALT 10 0 - 50 U/L    Protein Total 6.6 6.4 - 8.3 g/dL    Albumin 2.6 (L) 3.5 - 5.2 g/dL    Bilirubin Total 0.6 <=1.2 mg/dL   Blood gas venous   Result Value Ref Range    pH Venous 7.39 7.32 - 7.43    pCO2 Venous 38 (L) 40 - 50 mm Hg    pO2 Venous 56 (H) 25 - 47 mm Hg    Bicarbonate Venous 23 21 - 28 mmol/L    Base Excess/Deficit Venous -1.5 -3.0 - 3.0 mmol/L    FIO2 28     Oxyhemoglobin Venous 87 (H) 70 - 75 %    O2 Sat, Venous 89.5 (H) 70.0 - 75.0 %    Narrative    In healthy individuals, oxyhemoglobin (O2Hb) and oxygen saturation (SO2) are approximately equal. In the  presence of dyshemoglobins, oxyhemoglobin can be considerably lower than oxygen saturation.   Ketone Beta-Hydroxybutyrate Quantitative   Result Value Ref Range    Ketone (Beta-Hydroxybutyrate) Quantitative 1.89 (HH) <=0.30 mmol/L   Lactic acid whole blood with 1x repeat in 2 hr when >2   Result Value Ref Range    Lactic Acid, Initial 1.8 0.7 - 2.0 mmol/L   Extra Tube    Narrative    The following orders were created for panel order Extra Tube.  Procedure                               Abnormality         Status                     ---------                               -----------         ------                     Extra Blue Top Tube[[id number]]                             Final result               Extra Red Top Tube[[id number]]                              Final result                 Please view results for these tests on the individual orders.   Extra Blue Top Tube   Result Value Ref Range    Hold Specimen JIC    Extra Red Top Tube   Result Value Ref Range    Hold Specimen JIC    Magnesium   Result Value Ref Range    Magnesium 2.0 1.7 - 2.3 mg/dL   Troponin T, High Sensitivity   Result Value Ref Range    Troponin T, High Sensitivity 18 (H) <=14 ng/L   XR Chest 2 Views    Narrative    EXAM: XR CHEST 2 VIEWS  LOCATION: [facility]  DATE: 3/22/2025    INDICATION: Shortness of breath  COMPARISON: Chest radiograph 3/17/2025.      Impression    IMPRESSION: Stable size of cardiomediastinal silhouette. Questionable new retrocardiac opacity, differential includes atelectasis, aspiration or developing pneumonia. No definite pleural effusion or pneumothorax. Bones are unchanged.   Glucose by meter   Result Value Ref Range    GLUCOSE BY METER POCT 395 (H) 70 - 99 mg/dL   EKG 12-lead, tracing only   Result Value Ref Range    Systolic Blood Pressure  mmHg    Diastolic Blood Pressure  mmHg    Ventricular Rate 132 BPM    Atrial Rate  BPM    CT Interval  ms    QRS Duration 92 ms     ms    QTc 459 ms    P Axis   degrees    R AXIS 72 degrees    T Axis 69 degrees    Interpretation ECG       Supraventricular tachycardia  ST & T wave abnormality, consider inferior ischemia  Abnormal ECG  When compared with ECG of 18-Mar-2025 12:00,  Non-specific change in ST segment in Inferior leads     Troponin T, High Sensitivity   Result Value Ref Range    Troponin T, High Sensitivity 18 (H) <=14 ng/L   Procalcitonin   Result Value Ref Range    Procalcitonin 3.23 (H) <0.50 ng/mL   CTA Chest with Contrast   Result Value Ref Range    Radiologist flags New diagnosis of pulmonary embolism (AA)     Narrative    EXAM: CTA CHEST WITH CONTRAST  LOCATION: [facility]  DATE: 3/22/2025    INDICATION: R O PE, shortness of breath  COMPARISON: Same day 2 view chest radiograph, CT chest without contrast 07/21/2023  TECHNIQUE: Helical acquisition through the chest was performed during the arterial phase of contrast enhancement. 2D and 3D reconstructions performed by the CT technologist. Dose reduction techniques were used.  CONTRAST: Isovue 370 66mL    CT ANGIOGRAM CHEST: Pulmonary arteries are normal in size. Very small pulmonary artery emboli to subsegmental branches of the left lower lobe. No right heart strain.    LUNGS AND PLEURA: Mild emphysematous changes of the lungs. No focal pulmonary consolidation or pleural effusion.    MEDIASTINUM/AXILLAE: Atherosclerotic calcifications of the aortic arch and descending thoracic aorta. No lymphadenopathy. Trace pericardial effusion. Small sliding-type hiatal hernia.    CORONARY ARTERY CALCIFICATION: Mild.    UPPER ABDOMEN: Mildly thickened adrenal glands.    MUSCULOSKELETAL: Degenerative changes of the shoulders and thoracic spine. No acute osseous abnormality.      Impression    IMPRESSION:  1.  Very small pulmonary artery emboli to subsegmental branches of the left lower lobe. No right heart strain.  2.  Mild emphysematous changes of the lungs. No focal pulmonary consolidation or pleural  effusion.  3.  Trace pericardial effusion.  4.  Small sliding-type hiatal hernia.      [Critical Result: New diagnosis of pulmonary embolism]    Finding was identified on 3/22/2025 9:51 PM CDT.     Dr. Andrez Rob was contacted by me on 3/22/2025 10:03 PM CDT and verbalized understanding of the critical result.     Glucose by meter   Result Value Ref Range    GLUCOSE BY METER POCT 299 (H) 70 - 99 mg/dL   EKG 12-lead, tracing only   Result Value Ref Range    Systolic Blood Pressure  mmHg    Diastolic Blood Pressure  mmHg    Ventricular Rate 154 BPM    Atrial Rate  BPM    IA Interval  ms    QRS Duration 90 ms     ms    QTc 467 ms    P Axis  degrees    R AXIS 60 degrees    T Axis 65 degrees    Interpretation ECG       Atrial fibrillation with rapid ventricular response  Abnormal ECG  When compared with ECG of 22-Mar-2025 19:19, (unconfirmed)  Atrial fibrillation has replaced Sinus rhythm     EKG 12-lead, tracing only   Result Value Ref Range    Systolic Blood Pressure  mmHg    Diastolic Blood Pressure  mmHg    Ventricular Rate 109 BPM    Atrial Rate 109 BPM    IA Interval 170 ms    QRS Duration 96 ms     ms    QTc 474 ms    P Axis 80 degrees    R AXIS 63 degrees    T Axis 72 degrees    Interpretation ECG       Sinus tachycardia  Biatrial enlargement  Minimal voltage criteria for LVH, may be normal variant ( Magdy product )  Abnormal ECG  When compared with ECG of 22-Mar-2025 21:50,  MANUAL COMPARISON REQUIRED PREVIOUS ECG IS INCOMPATIBLE     UA with Microscopic reflex to Culture    Specimen: Urine, Straight Catheter   Result Value Ref Range    Color Urine Light Yellow Colorless, Straw, Light Yellow, Yellow    Appearance Urine Clear Clear    Glucose Urine >1000 (A) Negative mg/dL    Bilirubin Urine Negative Negative    Ketones Urine Trace (A) Negative mg/dL    Specific Gravity Urine 1.010 1.003 - 1.035    Blood Urine Trace (A) Negative    pH Urine 5.5 4.7 - 8.0    Protein Albumin Urine 10 (A) Negative  mg/dL    Urobilinogen Urine Normal Normal, 2.0 mg/dL    Nitrite Urine Negative Negative    Leukocyte Esterase Urine Negative Negative    Bacteria Urine Few (A) None Seen /HPF    Mucus Urine Present (A) None Seen /LPF    Amorphous Crystals Urine Few (A) None Seen /HPF    RBC Urine 3 (H) <=2 /HPF    WBC Urine 1 <=5 /HPF    Squamous Epithelials Urine 0 <=1 /HPF    Narrative    Urine Culture not indicated       Medications   enoxaparin ANTICOAGULANT (LOVENOX) injection 90 mg (has no administration in time range)   insulin regular (MYXREDLIN) 1 unit/mL infusion (has no administration in time range)   glucose gel 15-30 g (has no administration in time range)     Or   dextrose 50 % injection 25-50 mL (has no administration in time range)     Or   glucagon injection 1 mg (has no administration in time range)   Patient is already receiving anticoagulation with heparin, enoxaparin (LOVENOX), warfarin (COUMADIN)  or other anticoagulant medication (has no administration in time range)   0.45% sodium chloride + KCl 20 mEq/L infusion (has no administration in time range)   piperacillin-tazobactam (ZOSYN) 3.375 g vial to attach to  mL bag (has no administration in time range)   lactated ringers BOLUS 1,000 mL (0 mLs Intravenous Stopped 3/22/25 2113)   insulin (regular) (HumuLIN R/NovoLIN R) injection 5 Units (5 Units Intravenous $Given 3/22/25 2004)   levalbuterol (XOPENEX) neb solution 0.63 mg (0.63 mg Nebulization $Given 3/22/25 1949)   iopamidol (ISOVUE-370) solution 66 mL (66 mLs Intravenous $Given 3/22/25 2110)   sodium chloride (PF) 0.9% PF flush 100 mL (100 mLs Intravenous $Given 3/22/25 2110)   piperacillin-tazobactam (ZOSYN) 4.5 g vial to attach to  mL bag (0 g Intravenous Stopped 3/22/25 2152)       Assessments & Plan (with Medical Decision Making)     I have reviewed the nursing notes.    I have reviewed the findings, diagnosis, plan and need for follow up with the patient.    Summary:  Patient presents to  the ER today for elevated glucose level.  Potential diagnosis which have been considered and evaluated include DKA, diabetic ketosis, UTI, pneumonia, as well as others. Many of these have been excluded using the various modalities and assessment as noted on the chart. At the present time, the diagnosis given seems to be the most likely hyperglycemia, diabetic ketosis, pneumonia causing acute on chronic respiratory failure with hypoxia, and left lower lobe pulmonary embolism.  Upon arrival, vitals signs show blood pressure 144/86 with a pulse of 104.  Temperature 97.8  F.  Respirations 22 with oxygenation of 89% on O2 2 L via NC.  The patient is alert but poor historian.  Patient denies any issues at this time.  Lung sounds dim in the bases.  Does have tachycardic heart rate noted.  OmniPod noted on abdomen at this time.  Patient was just discharged for hyperglycemia 2 days prior.  Lab work obtained on arrival showing WBC of 16.4 with hemoglobin of 16.8.  Lactic acid 1.8.  Venous pH 7.39 with a CO2 of 38.  Sodium 134 with potassium of 4.5, calcium of 9.0 and magnesium of 2.0.  Renal function show BUN of 48.1 with a creatinine of 1.32 and GFR 46 which is slightly worse from 2 days prior.  Hepatic functions normal.  Beta hydroxybutyrate came back out high critical of 1.89.  UA shows trace ketones but no signs of infection.  Influenza COVID, RSV negative.  Chest x-ray was personally reviewed showing retrocardiac opacity which radiology said could be pneumonia, aspiration, or atelectasis.  For elevated glucose level did give regular insulin 5 units IV.  1 hour repeat blood glucose down to 299.  Patient had 0.9% NS infusing on arrival by EMS but this was discontinued on arrival.  After getting back from x-ray patient suddenly had heart rate and rhythm change.  Rate went up from 100 up to 130.  Patient blood pressure dropped to the 80's systolically.  Patient became lethargic but opens eyes to verbal stimuli and denied any  issues.  To find out that NS was not infusing so 1 L LR initiated.  Blood pressures improved to the 100's.  Patient became more cognizant.  ECG was obtained showing SVT with a heart rate of 132.  Oxygenation did dip and patient did have very bad congestion in the lungs.  With coughing and deep breathing did hear rattling.  Oxygen increased to 5 L and pulmonary toiletry was conducted with improvement.  Xopenex neb given.  Added on lab work with high-sensitivity troponin being 18 and 2-hour repeat being 18.  Procalcitonin 3.23.  Cultures obtained.  For pneumonia did initiate Zosyn 4.5 g.  CT angio of the chest was conducted showing left lower lobe subsegmental PE with no heart strain.  Patient did return from CT and was noted to be in sinus tachycardia.  ECG was conducted but while getting this, patient went into a rapid A-fib in the 150's.  Patient immediately converted back to sinus tachycardia and ECG was obtained.  Patient continued to be in and out of atrial fibrillation after this.  Did discuss this with Hospitalist Dr. Daniels.  Patient accepted for ICU admission.  Lovenox 1 mg/kg initiated per Hospitalist request.      Critical Care Time: None    Impression and plan discussed with patient. Questions answered, concerns addressed, indications for urgent re-evaluation reviewed, and  given. Patient/Parent/Caregiver agree with treatment plan and have no further questions at this time.      This document was prepared using a combination of typing and voice generated software.  While every attempt was made for accuracy, spelling and grammatical errors may exist.              New Prescriptions    No medications on file       Final diagnoses:   Pneumonia   Diabetic ketosis (H)   Hyperglycemia   Acute and chronic respiratory failure with hypoxia (H)   PAF (paroxysmal atrial fibrillation) (H)   Single subsegmental pulmonary embolism without acute cor pulmonale (H)       3/22/2025   [facility]        Andrez Rob, APRN CNP  03/22/25 1994

## 2025-03-22 NOTE — ED TRIAGE NOTES
Patient presents with EMS with c/o weakness, high blood sugar, and SOB. Reports that she was recently discharged from the hospital. Patient also reports abdominal pain. Normal BM last night

## 2025-03-23 ENCOUNTER — ANESTHESIA EVENT (OUTPATIENT)
Dept: INTENSIVE CARE | Facility: HOSPITAL | Age: 60
End: 2025-03-23
Payer: COMMERCIAL

## 2025-03-23 ENCOUNTER — MEDICAL CORRESPONDENCE (OUTPATIENT)
Dept: HEALTH INFORMATION MANAGEMENT | Facility: CLINIC | Age: 60
End: 2025-03-23
Payer: COMMERCIAL

## 2025-03-23 ENCOUNTER — APPOINTMENT (OUTPATIENT)
Dept: GENERAL RADIOLOGY | Facility: HOSPITAL | Age: 60
DRG: 870 | End: 2025-03-23
Attending: HOSPITALIST
Payer: COMMERCIAL

## 2025-03-23 ENCOUNTER — TRANSFERRED RECORDS (OUTPATIENT)
Dept: HEALTH INFORMATION MANAGEMENT | Facility: CLINIC | Age: 60
End: 2025-03-23
Payer: COMMERCIAL

## 2025-03-23 ENCOUNTER — ANESTHESIA (OUTPATIENT)
Dept: INTENSIVE CARE | Facility: HOSPITAL | Age: 60
End: 2025-03-23
Payer: COMMERCIAL

## 2025-03-23 ENCOUNTER — APPOINTMENT (OUTPATIENT)
Dept: GENERAL RADIOLOGY | Facility: HOSPITAL | Age: 60
DRG: 870 | End: 2025-03-23
Attending: INTERNAL MEDICINE
Payer: COMMERCIAL

## 2025-03-23 ENCOUNTER — APPOINTMENT (OUTPATIENT)
Dept: ULTRASOUND IMAGING | Facility: HOSPITAL | Age: 60
DRG: 870 | End: 2025-03-23
Attending: INTERNAL MEDICINE
Payer: COMMERCIAL

## 2025-03-23 LAB
ALLEN'S TEST: ABNORMAL
ALLEN'S TEST: NO
ANION GAP SERPL CALCULATED.3IONS-SCNC: 11 MMOL/L (ref 7–15)
ANION GAP SERPL CALCULATED.3IONS-SCNC: 12 MMOL/L (ref 7–15)
ANION GAP SERPL CALCULATED.3IONS-SCNC: 15 MMOL/L (ref 7–15)
ANION GAP SERPL CALCULATED.3IONS-SCNC: 16 MMOL/L (ref 7–15)
ANION GAP SERPL CALCULATED.3IONS-SCNC: 17 MMOL/L (ref 7–15)
ATRIAL RATE - MUSE: 109 BPM
ATRIAL RATE - MUSE: NORMAL BPM
ATRIAL RATE - MUSE: NORMAL BPM
B-OH-BUTYR SERPL-SCNC: 0.22 MMOL/L
B-OH-BUTYR SERPL-SCNC: 0.79 MMOL/L
B-OH-BUTYR SERPL-SCNC: 1.36 MMOL/L
B-OH-BUTYR SERPL-SCNC: 1.65 MMOL/L
B-OH-BUTYR SERPL-SCNC: 2.16 MMOL/L
B-OH-BUTYR SERPL-SCNC: <0.18 MMOL/L
BACTERIA BLD CULT: NO GROWTH
BASE EXCESS BLDA CALC-SCNC: -5.5 MMOL/L (ref -3–3)
BASE EXCESS BLDA CALC-SCNC: -5.7 MMOL/L (ref -3–3)
BASE EXCESS BLDA CALC-SCNC: -5.8 MMOL/L (ref -3–3)
BASE EXCESS BLDA CALC-SCNC: -5.9 MMOL/L (ref -3–3)
BASE EXCESS BLDA CALC-SCNC: -8.5 MMOL/L (ref -3–3)
BASE EXCESS BLDV CALC-SCNC: -6.5 MMOL/L (ref -3–3)
BUN SERPL-MCNC: 48.2 MG/DL (ref 8–23)
BUN SERPL-MCNC: 48.5 MG/DL (ref 8–23)
BUN SERPL-MCNC: 52.7 MG/DL (ref 8–23)
BUN SERPL-MCNC: 56.3 MG/DL (ref 8–23)
BUN SERPL-MCNC: 56.6 MG/DL (ref 8–23)
BUN SERPL-MCNC: 57.3 MG/DL (ref 8–23)
BUN SERPL-MCNC: 59 MG/DL (ref 8–23)
CALCIUM SERPL-MCNC: 7.9 MG/DL (ref 8.8–10.4)
CALCIUM SERPL-MCNC: 8.2 MG/DL (ref 8.8–10.4)
CALCIUM SERPL-MCNC: 8.9 MG/DL (ref 8.8–10.4)
CALCIUM SERPL-MCNC: 8.9 MG/DL (ref 8.8–10.4)
CALCIUM SERPL-MCNC: 9.2 MG/DL (ref 8.8–10.4)
CALCIUM SERPL-MCNC: 9.3 MG/DL (ref 8.8–10.4)
CALCIUM SERPL-MCNC: 9.4 MG/DL (ref 8.8–10.4)
CHLORIDE SERPL-SCNC: 102 MMOL/L (ref 98–107)
CHLORIDE SERPL-SCNC: 102 MMOL/L (ref 98–107)
CHLORIDE SERPL-SCNC: 103 MMOL/L (ref 98–107)
CHLORIDE SERPL-SCNC: 103 MMOL/L (ref 98–107)
CHLORIDE SERPL-SCNC: 106 MMOL/L (ref 98–107)
CHLORIDE SERPL-SCNC: 107 MMOL/L (ref 98–107)
CHLORIDE SERPL-SCNC: 99 MMOL/L (ref 98–107)
CREAT SERPL-MCNC: 1.37 MG/DL (ref 0.51–0.95)
CREAT SERPL-MCNC: 1.38 MG/DL (ref 0.51–0.95)
CREAT SERPL-MCNC: 1.64 MG/DL (ref 0.51–0.95)
CREAT SERPL-MCNC: 1.7 MG/DL (ref 0.51–0.95)
CREAT SERPL-MCNC: 1.74 MG/DL (ref 0.51–0.95)
CREAT SERPL-MCNC: 1.85 MG/DL (ref 0.51–0.95)
CREAT SERPL-MCNC: 1.91 MG/DL (ref 0.51–0.95)
DIASTOLIC BLOOD PRESSURE - MUSE: NORMAL MMHG
EGFRCR SERPLBLD CKD-EPI 2021: 29 ML/MIN/1.73M2
EGFRCR SERPLBLD CKD-EPI 2021: 30 ML/MIN/1.73M2
EGFRCR SERPLBLD CKD-EPI 2021: 33 ML/MIN/1.73M2
EGFRCR SERPLBLD CKD-EPI 2021: 34 ML/MIN/1.73M2
EGFRCR SERPLBLD CKD-EPI 2021: 35 ML/MIN/1.73M2
EGFRCR SERPLBLD CKD-EPI 2021: 43 ML/MIN/1.73M2
EGFRCR SERPLBLD CKD-EPI 2021: 44 ML/MIN/1.73M2
GLUCOSE BLDC GLUCOMTR-MCNC: 106 MG/DL (ref 70–99)
GLUCOSE BLDC GLUCOMTR-MCNC: 154 MG/DL (ref 70–99)
GLUCOSE BLDC GLUCOMTR-MCNC: 157 MG/DL (ref 70–99)
GLUCOSE BLDC GLUCOMTR-MCNC: 158 MG/DL (ref 70–99)
GLUCOSE BLDC GLUCOMTR-MCNC: 172 MG/DL (ref 70–99)
GLUCOSE BLDC GLUCOMTR-MCNC: 173 MG/DL (ref 70–99)
GLUCOSE BLDC GLUCOMTR-MCNC: 175 MG/DL (ref 70–99)
GLUCOSE BLDC GLUCOMTR-MCNC: 180 MG/DL (ref 70–99)
GLUCOSE BLDC GLUCOMTR-MCNC: 182 MG/DL (ref 70–99)
GLUCOSE BLDC GLUCOMTR-MCNC: 184 MG/DL (ref 70–99)
GLUCOSE BLDC GLUCOMTR-MCNC: 226 MG/DL (ref 70–99)
GLUCOSE BLDC GLUCOMTR-MCNC: 228 MG/DL (ref 70–99)
GLUCOSE BLDC GLUCOMTR-MCNC: 238 MG/DL (ref 70–99)
GLUCOSE BLDC GLUCOMTR-MCNC: 261 MG/DL (ref 70–99)
GLUCOSE BLDC GLUCOMTR-MCNC: 263 MG/DL (ref 70–99)
GLUCOSE BLDC GLUCOMTR-MCNC: 265 MG/DL (ref 70–99)
GLUCOSE BLDC GLUCOMTR-MCNC: 274 MG/DL (ref 70–99)
GLUCOSE BLDC GLUCOMTR-MCNC: 274 MG/DL (ref 70–99)
GLUCOSE BLDC GLUCOMTR-MCNC: 290 MG/DL (ref 70–99)
GLUCOSE SERPL-MCNC: 157 MG/DL (ref 70–99)
GLUCOSE SERPL-MCNC: 218 MG/DL (ref 70–99)
GLUCOSE SERPL-MCNC: 219 MG/DL (ref 70–99)
GLUCOSE SERPL-MCNC: 224 MG/DL (ref 70–99)
GLUCOSE SERPL-MCNC: 248 MG/DL (ref 70–99)
GLUCOSE SERPL-MCNC: 284 MG/DL (ref 70–99)
GLUCOSE SERPL-MCNC: 337 MG/DL (ref 70–99)
HCO3 BLD-SCNC: 18 MMOL/L (ref 21–28)
HCO3 BLD-SCNC: 22 MMOL/L (ref 21–28)
HCO3 BLD-SCNC: 23 MMOL/L (ref 21–28)
HCO3 BLD-SCNC: 25 MMOL/L (ref 21–28)
HCO3 BLD-SCNC: 28 MMOL/L (ref 21–28)
HCO3 BLDV-SCNC: 20 MMOL/L (ref 21–28)
HCO3 SERPL-SCNC: 19 MMOL/L (ref 22–29)
HCO3 SERPL-SCNC: 20 MMOL/L (ref 22–29)
HCO3 SERPL-SCNC: 21 MMOL/L (ref 22–29)
HCO3 SERPL-SCNC: 21 MMOL/L (ref 22–29)
HCO3 SERPL-SCNC: 22 MMOL/L (ref 22–29)
HCO3 SERPL-SCNC: 23 MMOL/L (ref 22–29)
HCO3 SERPL-SCNC: 24 MMOL/L (ref 22–29)
HGB BLD-MCNC: 16.1 G/DL (ref 11.7–15.7)
HOLD SPECIMEN: NORMAL
INR PPP: 1.29 (ref 0.85–1.15)
INTERPRETATION ECG - MUSE: NORMAL
LACTATE SERPL-SCNC: 1.3 MMOL/L (ref 0.7–2)
MAGNESIUM SERPL-MCNC: 2.2 MG/DL (ref 1.7–2.3)
NT-PROBNP SERPL-MCNC: 2201 PG/ML (ref 0–900)
O2/TOTAL GAS SETTING VFR VENT: 100 %
O2/TOTAL GAS SETTING VFR VENT: 60 %
O2/TOTAL GAS SETTING VFR VENT: 70 %
O2/TOTAL GAS SETTING VFR VENT: 90 %
OXYHGB MFR BLDA: 86 % (ref 92–100)
OXYHGB MFR BLDA: 94 % (ref 92–100)
OXYHGB MFR BLDA: 95 % (ref 92–100)
OXYHGB MFR BLDA: 96 % (ref 92–100)
OXYHGB MFR BLDA: 98 % (ref 92–100)
OXYHGB MFR BLDV: 87 % (ref 70–75)
P AXIS - MUSE: 80 DEGREES
P AXIS - MUSE: NORMAL DEGREES
P AXIS - MUSE: NORMAL DEGREES
PCO2 BLD: 103 MM HG (ref 35–45)
PCO2 BLD: 42 MM HG (ref 35–45)
PCO2 BLD: 52 MM HG (ref 35–45)
PCO2 BLD: 56 MM HG (ref 35–45)
PCO2 BLD: 67 MM HG (ref 35–45)
PCO2 BLDV: 44 MM HG (ref 40–50)
PEEP: 10 CM H2O
PEEP: 5 CM H2O
PH BLD: 7.05 [PH] (ref 7.35–7.45)
PH BLD: 7.18 [PH] (ref 7.35–7.45)
PH BLD: 7.22 [PH] (ref 7.35–7.45)
PH BLD: 7.24 [PH] (ref 7.35–7.45)
PH BLD: 7.25 [PH] (ref 7.35–7.45)
PH BLDV: 7.27 [PH] (ref 7.32–7.43)
PHOSPHATE SERPL-MCNC: 3.7 MG/DL (ref 2.5–4.5)
PHOSPHATE SERPL-MCNC: 5.2 MG/DL (ref 2.5–4.5)
PHOSPHATE SERPL-MCNC: 5.3 MG/DL (ref 2.5–4.5)
PHOSPHATE SERPL-MCNC: 5.7 MG/DL (ref 2.5–4.5)
PHOSPHATE SERPL-MCNC: 5.9 MG/DL (ref 2.5–4.5)
PHOSPHATE SERPL-MCNC: 5.9 MG/DL (ref 2.5–4.5)
PO2 BLD: 106 MM HG (ref 80–105)
PO2 BLD: 137 MM HG (ref 80–105)
PO2 BLD: 60 MM HG (ref 80–105)
PO2 BLD: 79 MM HG (ref 80–105)
PO2 BLD: 82 MM HG (ref 80–105)
PO2 BLDV: 54 MM HG (ref 25–47)
POTASSIUM SERPL-SCNC: 4 MMOL/L (ref 3.4–5.3)
POTASSIUM SERPL-SCNC: 4.1 MMOL/L (ref 3.4–5.3)
POTASSIUM SERPL-SCNC: 4.3 MMOL/L (ref 3.4–5.3)
POTASSIUM SERPL-SCNC: 4.6 MMOL/L (ref 3.4–5.3)
POTASSIUM SERPL-SCNC: 4.7 MMOL/L (ref 3.4–5.3)
POTASSIUM SERPL-SCNC: 5 MMOL/L (ref 3.4–5.3)
POTASSIUM SERPL-SCNC: 5.3 MMOL/L (ref 3.4–5.3)
PR INTERVAL - MUSE: 170 MS
PR INTERVAL - MUSE: NORMAL MS
PR INTERVAL - MUSE: NORMAL MS
QRS DURATION - MUSE: 90 MS
QRS DURATION - MUSE: 92 MS
QRS DURATION - MUSE: 96 MS
QT - MUSE: 292 MS
QT - MUSE: 310 MS
QT - MUSE: 352 MS
QTC - MUSE: 459 MS
QTC - MUSE: 467 MS
QTC - MUSE: 474 MS
R AXIS - MUSE: 60 DEGREES
R AXIS - MUSE: 63 DEGREES
R AXIS - MUSE: 72 DEGREES
SAO2 % BLDA: 87.3 % (ref 96–97)
SAO2 % BLDA: 95 % (ref 96–97)
SAO2 % BLDA: 95.8 % (ref 96–97)
SAO2 % BLDA: 96.1 % (ref 96–97)
SAO2 % BLDA: 99.5 % (ref 96–97)
SAO2 % BLDV: 88.7 % (ref 70–75)
SODIUM SERPL-SCNC: 135 MMOL/L (ref 135–145)
SODIUM SERPL-SCNC: 136 MMOL/L (ref 135–145)
SODIUM SERPL-SCNC: 139 MMOL/L (ref 135–145)
SODIUM SERPL-SCNC: 141 MMOL/L (ref 135–145)
SODIUM SERPL-SCNC: 141 MMOL/L (ref 135–145)
SODIUM SERPL-SCNC: 142 MMOL/L (ref 135–145)
SODIUM SERPL-SCNC: 143 MMOL/L (ref 135–145)
SYSTOLIC BLOOD PRESSURE - MUSE: NORMAL MMHG
T AXIS - MUSE: 65 DEGREES
T AXIS - MUSE: 69 DEGREES
T AXIS - MUSE: 72 DEGREES
TROPONIN T SERPL HS-MCNC: 21 NG/L
TROPONIN T SERPL HS-MCNC: 21 NG/L
TROPONIN T SERPL HS-MCNC: 24 NG/L
VENTRICULAR RATE- MUSE: 109 BPM
VENTRICULAR RATE- MUSE: 132 BPM
VENTRICULAR RATE- MUSE: 154 BPM

## 2025-03-23 PROCEDURE — 5A1955Z RESPIRATORY VENTILATION, GREATER THAN 96 CONSECUTIVE HOURS: ICD-10-PCS | Performed by: INTERNAL MEDICINE

## 2025-03-23 PROCEDURE — 258N000003 HC RX IP 258 OP 636: Performed by: INTERNAL MEDICINE

## 2025-03-23 PROCEDURE — 83735 ASSAY OF MAGNESIUM: CPT | Performed by: INTERNAL MEDICINE

## 2025-03-23 PROCEDURE — 99233 SBSQ HOSP IP/OBS HIGH 50: CPT | Performed by: INTERNAL MEDICINE

## 2025-03-23 PROCEDURE — 71045 X-RAY EXAM CHEST 1 VIEW: CPT | Mod: 77

## 2025-03-23 PROCEDURE — 94660 CPAP INITIATION&MGMT: CPT

## 2025-03-23 PROCEDURE — 999N000065 XR ABDOMEN PORT 1 VIEW

## 2025-03-23 PROCEDURE — 82010 KETONE BODYS QUAN: CPT | Performed by: HOSPITALIST

## 2025-03-23 PROCEDURE — 80048 BASIC METABOLIC PNL TOTAL CA: CPT | Performed by: HOSPITALIST

## 2025-03-23 PROCEDURE — 36415 COLL VENOUS BLD VENIPUNCTURE: CPT | Performed by: HOSPITALIST

## 2025-03-23 PROCEDURE — 84484 ASSAY OF TROPONIN QUANT: CPT

## 2025-03-23 PROCEDURE — 250N000009 HC RX 250: Performed by: INTERNAL MEDICINE

## 2025-03-23 PROCEDURE — 250N000011 HC RX IP 250 OP 636: Performed by: HOSPITALIST

## 2025-03-23 PROCEDURE — 93970 EXTREMITY STUDY: CPT

## 2025-03-23 PROCEDURE — 5A09357 ASSISTANCE WITH RESPIRATORY VENTILATION, LESS THAN 24 CONSECUTIVE HOURS, CONTINUOUS POSITIVE AIRWAY PRESSURE: ICD-10-PCS | Performed by: INTERNAL MEDICINE

## 2025-03-23 PROCEDURE — 93005 ELECTROCARDIOGRAM TRACING: CPT

## 2025-03-23 PROCEDURE — 250N000012 HC RX MED GY IP 250 OP 636 PS 637: Performed by: HOSPITALIST

## 2025-03-23 PROCEDURE — 999N000157 HC STATISTIC RCP TIME EA 10 MIN

## 2025-03-23 PROCEDURE — 85018 HEMOGLOBIN: CPT | Performed by: INTERNAL MEDICINE

## 2025-03-23 PROCEDURE — 36592 COLLECT BLOOD FROM PICC: CPT | Performed by: HOSPITALIST

## 2025-03-23 PROCEDURE — 999N000065 XR CHEST 1 VIEW

## 2025-03-23 PROCEDURE — 94640 AIRWAY INHALATION TREATMENT: CPT

## 2025-03-23 PROCEDURE — 0BH17EZ INSERTION OF ENDOTRACHEAL AIRWAY INTO TRACHEA, VIA NATURAL OR ARTIFICIAL OPENING: ICD-10-PCS | Performed by: INTERNAL MEDICINE

## 2025-03-23 PROCEDURE — 82805 BLOOD GASES W/O2 SATURATION: CPT | Performed by: INTERNAL MEDICINE

## 2025-03-23 PROCEDURE — 250N000009 HC RX 250: Performed by: HOSPITALIST

## 2025-03-23 PROCEDURE — 250N000011 HC RX IP 250 OP 636: Performed by: INTERNAL MEDICINE

## 2025-03-23 PROCEDURE — 93010 ELECTROCARDIOGRAM REPORT: CPT | Performed by: INTERNAL MEDICINE

## 2025-03-23 PROCEDURE — 94640 AIRWAY INHALATION TREATMENT: CPT | Mod: 76

## 2025-03-23 PROCEDURE — 84484 ASSAY OF TROPONIN QUANT: CPT | Performed by: INTERNAL MEDICINE

## 2025-03-23 PROCEDURE — 250N000011 HC RX IP 250 OP 636: Mod: JZ | Performed by: INTERNAL MEDICINE

## 2025-03-23 PROCEDURE — 85610 PROTHROMBIN TIME: CPT | Performed by: INTERNAL MEDICINE

## 2025-03-23 PROCEDURE — 71045 X-RAY EXAM CHEST 1 VIEW: CPT

## 2025-03-23 PROCEDURE — 200N000001 HC R&B ICU

## 2025-03-23 PROCEDURE — 250N000011 HC RX IP 250 OP 636: Performed by: NURSE ANESTHETIST, CERTIFIED REGISTERED

## 2025-03-23 PROCEDURE — 83880 ASSAY OF NATRIURETIC PEPTIDE: CPT

## 2025-03-23 PROCEDURE — 250N000013 HC RX MED GY IP 250 OP 250 PS 637: Performed by: HOSPITALIST

## 2025-03-23 PROCEDURE — 36600 WITHDRAWAL OF ARTERIAL BLOOD: CPT

## 2025-03-23 PROCEDURE — 83605 ASSAY OF LACTIC ACID: CPT | Performed by: INTERNAL MEDICINE

## 2025-03-23 PROCEDURE — 84100 ASSAY OF PHOSPHORUS: CPT | Performed by: HOSPITALIST

## 2025-03-23 PROCEDURE — 94002 VENT MGMT INPAT INIT DAY: CPT

## 2025-03-23 RX ORDER — MIDAZOLAM HCL IN 0.9 % NACL/PF 1 MG/ML
.5-8 PLASTIC BAG, INJECTION (ML) INTRAVENOUS CONTINUOUS
Status: DISCONTINUED | OUTPATIENT
Start: 2025-03-23 | End: 2025-03-27

## 2025-03-23 RX ORDER — NOREPINEPHRINE BITARTRATE 0.02 MG/ML
INJECTION, SOLUTION INTRAVENOUS
Status: COMPLETED
Start: 2025-03-23 | End: 2025-03-23

## 2025-03-23 RX ORDER — DILTIAZEM HYDROCHLORIDE 30 MG/1
60 TABLET, FILM COATED ORAL EVERY 8 HOURS SCHEDULED
Status: DISCONTINUED | OUTPATIENT
Start: 2025-03-23 | End: 2025-03-29

## 2025-03-23 RX ORDER — DEXTROSE MONOHYDRATE 25 G/50ML
25-50 INJECTION, SOLUTION INTRAVENOUS
Status: DISCONTINUED | OUTPATIENT
Start: 2025-03-23 | End: 2025-03-23

## 2025-03-23 RX ORDER — NOREPINEPHRINE BITARTRATE 0.02 MG/ML
.01-.6 INJECTION, SOLUTION INTRAVENOUS CONTINUOUS
Status: DISCONTINUED | OUTPATIENT
Start: 2025-03-23 | End: 2025-03-29

## 2025-03-23 RX ORDER — PROPOFOL 10 MG/ML
INJECTION, EMULSION INTRAVENOUS
Status: COMPLETED
Start: 2025-03-23 | End: 2025-03-23

## 2025-03-23 RX ORDER — ASPIRIN 81 MG/1
81 TABLET, CHEWABLE ORAL DAILY
Status: DISCONTINUED | OUTPATIENT
Start: 2025-03-23 | End: 2025-03-31 | Stop reason: HOSPADM

## 2025-03-23 RX ORDER — VITAMIN B COMPLEX
25 TABLET ORAL DAILY
Status: DISCONTINUED | OUTPATIENT
Start: 2025-03-23 | End: 2025-03-25

## 2025-03-23 RX ORDER — ATORVASTATIN CALCIUM 20 MG/1
40 TABLET, FILM COATED ORAL AT BEDTIME
Status: DISCONTINUED | OUTPATIENT
Start: 2025-03-23 | End: 2025-03-31 | Stop reason: HOSPADM

## 2025-03-23 RX ORDER — ACETAMINOPHEN 325 MG/1
325 TABLET ORAL EVERY 6 HOURS PRN
Status: DISCONTINUED | OUTPATIENT
Start: 2025-03-23 | End: 2025-03-25

## 2025-03-23 RX ORDER — METHYLPREDNISOLONE SODIUM SUCCINATE 125 MG/2ML
125 INJECTION INTRAMUSCULAR; INTRAVENOUS EVERY 6 HOURS
Status: COMPLETED | OUTPATIENT
Start: 2025-03-23 | End: 2025-03-24

## 2025-03-23 RX ORDER — DEXTROSE MONOHYDRATE 25 G/50ML
25-50 INJECTION, SOLUTION INTRAVENOUS
Status: DISCONTINUED | OUTPATIENT
Start: 2025-03-23 | End: 2025-03-31 | Stop reason: HOSPADM

## 2025-03-23 RX ORDER — CHLORHEXIDINE GLUCONATE ORAL RINSE 1.2 MG/ML
15 SOLUTION DENTAL EVERY 12 HOURS
Status: DISCONTINUED | OUTPATIENT
Start: 2025-03-23 | End: 2025-03-31 | Stop reason: HOSPADM

## 2025-03-23 RX ORDER — DILTIAZEM HCL/D5W 125 MG/125
5 PLASTIC BAG, INJECTION (ML) INTRAVENOUS CONTINUOUS
Status: DISCONTINUED | OUTPATIENT
Start: 2025-03-23 | End: 2025-03-26

## 2025-03-23 RX ORDER — PANTOPRAZOLE SODIUM 40 MG/1
40 FOR SUSPENSION ORAL
Status: DISCONTINUED | OUTPATIENT
Start: 2025-03-23 | End: 2025-03-23

## 2025-03-23 RX ORDER — NICOTINE POLACRILEX 4 MG
15-30 LOZENGE BUCCAL
Status: DISCONTINUED | OUTPATIENT
Start: 2025-03-23 | End: 2025-03-23

## 2025-03-23 RX ORDER — NALOXONE HYDROCHLORIDE 0.4 MG/ML
0.2 INJECTION, SOLUTION INTRAMUSCULAR; INTRAVENOUS; SUBCUTANEOUS
Status: DISCONTINUED | OUTPATIENT
Start: 2025-03-23 | End: 2025-03-31 | Stop reason: HOSPADM

## 2025-03-23 RX ORDER — IPRATROPIUM BROMIDE AND ALBUTEROL SULFATE 2.5; .5 MG/3ML; MG/3ML
3 SOLUTION RESPIRATORY (INHALATION)
Status: DISCONTINUED | OUTPATIENT
Start: 2025-03-23 | End: 2025-03-25

## 2025-03-23 RX ORDER — NALOXONE HYDROCHLORIDE 0.4 MG/ML
0.4 INJECTION, SOLUTION INTRAMUSCULAR; INTRAVENOUS; SUBCUTANEOUS
Status: DISCONTINUED | OUTPATIENT
Start: 2025-03-23 | End: 2025-03-31 | Stop reason: HOSPADM

## 2025-03-23 RX ORDER — PIPERACILLIN SODIUM, TAZOBACTAM SODIUM 3; .375 G/15ML; G/15ML
3.38 INJECTION, POWDER, LYOPHILIZED, FOR SOLUTION INTRAVENOUS EVERY 6 HOURS
Status: DISCONTINUED | OUTPATIENT
Start: 2025-03-23 | End: 2025-03-31 | Stop reason: HOSPADM

## 2025-03-23 RX ORDER — PRIMIDONE 50 MG/1
50 TABLET ORAL AT BEDTIME
Status: DISCONTINUED | OUTPATIENT
Start: 2025-03-23 | End: 2025-03-25

## 2025-03-23 RX ORDER — FENTANYL CITRATE 50 UG/ML
50 INJECTION, SOLUTION INTRAMUSCULAR; INTRAVENOUS ONCE
Status: COMPLETED | OUTPATIENT
Start: 2025-03-23 | End: 2025-03-23

## 2025-03-23 RX ORDER — IPRATROPIUM BROMIDE AND ALBUTEROL SULFATE 2.5; .5 MG/3ML; MG/3ML
3 SOLUTION RESPIRATORY (INHALATION) ONCE
Status: COMPLETED | OUTPATIENT
Start: 2025-03-23 | End: 2025-03-23

## 2025-03-23 RX ORDER — DEXTROSE MONOHYDRATE 100 MG/ML
INJECTION, SOLUTION INTRAVENOUS CONTINUOUS PRN
Status: DISCONTINUED | OUTPATIENT
Start: 2025-03-23 | End: 2025-03-27

## 2025-03-23 RX ORDER — PROPOFOL 10 MG/ML
5-75 INJECTION, EMULSION INTRAVENOUS CONTINUOUS
Status: DISCONTINUED | OUTPATIENT
Start: 2025-03-23 | End: 2025-03-23

## 2025-03-23 RX ORDER — ALBUTEROL SULFATE 90 UG/1
2 INHALANT RESPIRATORY (INHALATION) DAILY PRN
Status: DISCONTINUED | OUTPATIENT
Start: 2025-03-23 | End: 2025-03-31 | Stop reason: HOSPADM

## 2025-03-23 RX ORDER — IPRATROPIUM BROMIDE AND ALBUTEROL SULFATE 2.5; .5 MG/3ML; MG/3ML
SOLUTION RESPIRATORY (INHALATION)
Status: COMPLETED
Start: 2025-03-23 | End: 2025-03-23

## 2025-03-23 RX ORDER — DILTIAZEM HYDROCHLORIDE 5 MG/ML
10 INJECTION INTRAVENOUS
Status: ACTIVE | OUTPATIENT
Start: 2025-03-23 | End: 2025-03-24

## 2025-03-23 RX ORDER — CARBOXYMETHYLCELLULOSE SODIUM 5 MG/ML
1 SOLUTION/ DROPS OPHTHALMIC
Status: DISCONTINUED | OUTPATIENT
Start: 2025-03-23 | End: 2025-03-31 | Stop reason: HOSPADM

## 2025-03-23 RX ORDER — NICOTINE POLACRILEX 4 MG
15-30 LOZENGE BUCCAL
Status: DISCONTINUED | OUTPATIENT
Start: 2025-03-23 | End: 2025-03-31 | Stop reason: HOSPADM

## 2025-03-23 RX ORDER — PROPOFOL 10 MG/ML
INJECTION, EMULSION INTRAVENOUS PRN
Status: DISCONTINUED | OUTPATIENT
Start: 2025-03-23 | End: 2025-03-23

## 2025-03-23 RX ADMIN — SODIUM CHLORIDE, SODIUM LACTATE, POTASSIUM CHLORIDE, AND CALCIUM CHLORIDE 500 ML: .6; .31; .03; .02 INJECTION, SOLUTION INTRAVENOUS at 23:41

## 2025-03-23 RX ADMIN — IPRATROPIUM BROMIDE AND ALBUTEROL SULFATE 3 ML: 2.5; .5 SOLUTION RESPIRATORY (INHALATION) at 19:13

## 2025-03-23 RX ADMIN — IPRATROPIUM BROMIDE AND ALBUTEROL SULFATE 3 ML: .5; 3 SOLUTION RESPIRATORY (INHALATION) at 19:13

## 2025-03-23 RX ADMIN — SODIUM CHLORIDE 1000 ML: 9 INJECTION, SOLUTION INTRAVENOUS at 17:23

## 2025-03-23 RX ADMIN — INSULIN ASPART 1 UNITS: 100 INJECTION, SOLUTION INTRAVENOUS; SUBCUTANEOUS at 04:00

## 2025-03-23 RX ADMIN — SODIUM CHLORIDE 1000 ML: 9 INJECTION, SOLUTION INTRAVENOUS at 18:20

## 2025-03-23 RX ADMIN — PIPERACILLIN AND TAZOBACTAM 3.38 G: 3; .375 INJECTION, POWDER, FOR SOLUTION INTRAVENOUS at 16:52

## 2025-03-23 RX ADMIN — METHYLPREDNISOLONE SODIUM SUCCINATE 125 MG: 125 INJECTION, POWDER, FOR SOLUTION INTRAMUSCULAR; INTRAVENOUS at 19:45

## 2025-03-23 RX ADMIN — Medication 3 MG/HR: at 18:23

## 2025-03-23 RX ADMIN — NOREPINEPHRINE BITARTRATE 0.16 MCG/KG/MIN: 0.02 INJECTION, SOLUTION INTRAVENOUS at 23:08

## 2025-03-23 RX ADMIN — ENOXAPARIN SODIUM 70 MG: 80 INJECTION SUBCUTANEOUS at 09:09

## 2025-03-23 RX ADMIN — PIPERACILLIN AND TAZOBACTAM 3.38 G: 3; .375 INJECTION, POWDER, FOR SOLUTION INTRAVENOUS at 22:59

## 2025-03-23 RX ADMIN — Medication 100 MG: at 17:19

## 2025-03-23 RX ADMIN — INSULIN ASPART 3 UNITS: 100 INJECTION, SOLUTION INTRAVENOUS; SUBCUTANEOUS at 08:52

## 2025-03-23 RX ADMIN — CHLORHEXIDINE GLUCONATE ORAL RINSE 15 ML: 1.2 SOLUTION DENTAL at 20:52

## 2025-03-23 RX ADMIN — PRIMIDONE 50 MG: 50 TABLET ORAL at 23:01

## 2025-03-23 RX ADMIN — SODIUM CHLORIDE, SODIUM LACTATE, POTASSIUM CHLORIDE, AND CALCIUM CHLORIDE 1000 ML: .6; .31; .03; .02 INJECTION, SOLUTION INTRAVENOUS at 08:51

## 2025-03-23 RX ADMIN — Medication 25 MCG/HR: at 17:47

## 2025-03-23 RX ADMIN — DILTIAZEM HYDROCHLORIDE 60 MG: 30 TABLET, FILM COATED ORAL at 05:14

## 2025-03-23 RX ADMIN — PIPERACILLIN AND TAZOBACTAM 3.38 G: 3; .375 INJECTION, POWDER, FOR SOLUTION INTRAVENOUS at 09:47

## 2025-03-23 RX ADMIN — IPRATROPIUM BROMIDE AND ALBUTEROL SULFATE 3 ML: .5; 3 SOLUTION RESPIRATORY (INHALATION) at 19:42

## 2025-03-23 RX ADMIN — DILTIAZEM HYDROCHLORIDE 10 MG: 5 INJECTION, SOLUTION INTRAVENOUS at 06:08

## 2025-03-23 RX ADMIN — NOREPINEPHRINE BITARTRATE 0.3 MCG/KG/MIN: 0.02 INJECTION, SOLUTION INTRAVENOUS at 20:21

## 2025-03-23 RX ADMIN — INSULIN HUMAN 3.5 UNITS/HR: 1 INJECTION, SOLUTION INTRAVENOUS at 11:09

## 2025-03-23 RX ADMIN — NOREPINEPHRINE BITARTRATE 0.03 MCG/KG/MIN: 0.02 INJECTION, SOLUTION INTRAVENOUS at 17:33

## 2025-03-23 RX ADMIN — PROPOFOL 80 MG: 10 INJECTION, EMULSION INTRAVENOUS at 17:19

## 2025-03-23 RX ADMIN — INSULIN ASPART 1 UNITS: 100 INJECTION, SOLUTION INTRAVENOUS; SUBCUTANEOUS at 20:58

## 2025-03-23 RX ADMIN — IPRATROPIUM BROMIDE AND ALBUTEROL SULFATE 3 ML: .5; 3 SOLUTION RESPIRATORY (INHALATION) at 23:22

## 2025-03-23 RX ADMIN — Medication 5 MG/HR: at 12:45

## 2025-03-23 RX ADMIN — ATORVASTATIN CALCIUM 40 MG: 20 TABLET, FILM COATED ORAL at 23:01

## 2025-03-23 RX ADMIN — FENTANYL CITRATE 50 MCG: 50 INJECTION INTRAMUSCULAR; INTRAVENOUS at 17:53

## 2025-03-23 RX ADMIN — PIPERACILLIN AND TAZOBACTAM 3.38 G: 3; .375 INJECTION, POWDER, FOR SOLUTION INTRAVENOUS at 03:50

## 2025-03-23 RX ADMIN — FENTANYL CITRATE 50 MCG: 50 INJECTION INTRAMUSCULAR; INTRAVENOUS at 17:51

## 2025-03-23 RX ADMIN — INSULIN GLARGINE 20 UNITS: 300 INJECTION, SOLUTION SUBCUTANEOUS at 09:11

## 2025-03-23 RX ADMIN — ENOXAPARIN SODIUM 70 MG: 80 INJECTION SUBCUTANEOUS at 22:55

## 2025-03-23 ASSESSMENT — LIFESTYLE VARIABLES: TOBACCO_USE: 1

## 2025-03-23 ASSESSMENT — ACTIVITIES OF DAILY LIVING (ADL)
ADLS_ACUITY_SCORE: 70
ADLS_ACUITY_SCORE: 74
ADLS_ACUITY_SCORE: 70
ADLS_ACUITY_SCORE: 74
ADLS_ACUITY_SCORE: 55
ADLS_ACUITY_SCORE: 55
ADLS_ACUITY_SCORE: 68
ADLS_ACUITY_SCORE: 55
ADLS_ACUITY_SCORE: 68
ADLS_ACUITY_SCORE: 68
ADLS_ACUITY_SCORE: 59
ADLS_ACUITY_SCORE: 59
ADLS_ACUITY_SCORE: 68
ADLS_ACUITY_SCORE: 70
ADLS_ACUITY_SCORE: 74
ADLS_ACUITY_SCORE: 59
ADLS_ACUITY_SCORE: 55
ADLS_ACUITY_SCORE: 59
ADLS_ACUITY_SCORE: 68

## 2025-03-23 ASSESSMENT — COPD QUESTIONNAIRES
COPD: 1
CAT_SEVERITY: SEVERE

## 2025-03-23 ASSESSMENT — ENCOUNTER SYMPTOMS: DYSRHYTHMIAS: 1

## 2025-03-23 NOTE — ANESTHESIA CARE TRANSFER NOTE
Patient: Marly Cannon    Procedure: * No procedures listed *       Diagnosis: * No pre-op diagnosis entered *  Diagnosis Additional Information: No value filed.    Anesthesia Type:   General     Note:    Oropharynx: endotracheal tube in place, ventilatory support and oropharynx clear of all foreign objects  Level of Consciousness: drowsy      Independent Airway: airway patency not satisfactory and stable  Dentition: dentition unchanged  Vital Signs Stable: post-procedure vital signs reviewed and stable  Report to RN Given: handoff report given  Patient transferred to: ICU    ICU Handoff: Call for PAUSE to initiate/utilize ICU HANDOFF, Identified Patient, Identified Responsible Provider, Reviewed the Pertinent Medical History, Discussed Surgical Course, Reviewed Intra-OP Anesthesia Management and Issues during Anesthesia, Set Expectations for Post Procedure Period and Allowed Opportunity for Questions and Acknowledgement of Understanding    Vitals:  Vitals Value Taken Time   /53 03/23/25 1806   Temp     Pulse 101 03/23/25 1808   Resp 24 03/23/25 1808   SpO2 90 % 03/23/25 1808   Vitals shown include unfiled device data.    Electronically Signed By: BLAIR Martinez CRNA  March 23, 2025  6:09 PM

## 2025-03-23 NOTE — ANESTHESIA PREPROCEDURE EVALUATION
Anesthesia Pre-Procedure Evaluation    Patient: Marly Cannon   MRN: [id number] : 1965        Procedure :           History reviewed. No pertinent past medical history.   Past Surgical History:   Procedure Laterality Date     BIOPSY BREAST Left 2008    patient states no bx, Clip in left breast/ stereo bxc  no path in [facility] that far back     COLONOSCOPY N/A 2015    Procedure: COLONOSCOPY;  Surgeon: Gentry Porter MD;  Location: [facility] OR     COLONOSCOPY N/A 2018    Procedure: COLONOSCOPY;  COLONOSCOPY WITH POLYPECTOMY;  Surgeon: Gentry Porter MD;  Location: [facility] OR      No Known Allergies   Social History     Tobacco Use     Smoking status: Former     Current packs/day: 1.00     Average packs/day: 1 pack/day for 46.2 years (46.2 ttl pk-yrs)     Types: Cigarettes     Start date: 1979     Passive exposure: Current     Smokeless tobacco: Never     Tobacco comments:     Declined QP 2020   Substance Use Topics     Alcohol use: No     Alcohol/week: 0.0 standard drinks of alcohol      Wt Readings from Last 1 Encounters:   25 90.4 kg (199 lb 4.7 oz)        Anesthesia Evaluation   Pt has had prior anesthetic. Type: MAC and General.    History of anesthetic complications       ROS/MED HX  ENT/Pulmonary: Comment: Acute on chronic respiratory failure     (+) sleep apnea,               tobacco use, Past use,  46  Pack-Year Hx,       severe,  COPD,    recent URI, unresolved,         Neurologic:       Cardiovascular:     (+) Dyslipidemia hypertension- -   -  - -   Taking blood thinners                     dysrhythmias, a-fib,             METS/Exercise Tolerance:     Hematologic:     (+) History of blood clots,               Musculoskeletal:       GI/Hepatic:       Renal/Genitourinary:     (+) renal disease, type: CRI,            Endo:     (+)  type II DM,   Using insulin,    Diabetic complications: neuropathy.      Obesity,       Psychiatric/Substance Use:       Infectious  "Disease:       Malignancy:       Other:            Physical Exam    Airway   unable to assess          Respiratory Devices and Support         Dental    unable to assess        Cardiovascular    unable to assess         Pulmonary    Unable to assess           OUTSIDE LABS:  CBC:   Lab Results   Component Value Date    WBC 16.4 (H) 03/22/2025    WBC 13.7 (H) 03/20/2025    HGB 16.8 (H) 03/22/2025    HGB 17.5 (H) 03/20/2025    HCT 51.0 (H) 03/22/2025    HCT 54.0 (H) 03/20/2025     03/22/2025     03/20/2025     BMP:   Lab Results   Component Value Date     03/23/2025     03/23/2025    POTASSIUM 4.1 03/23/2025    POTASSIUM 4.7 03/23/2025    CHLORIDE 103 03/23/2025    CHLORIDE 102 03/23/2025    CO2 23 03/23/2025    CO2 20 (L) 03/23/2025    BUN 57.3 (H) 03/23/2025    BUN 56.3 (H) 03/23/2025    CR 1.74 (H) 03/23/2025    CR 1.70 (H) 03/23/2025     (H) 03/23/2025     (H) 03/23/2025     COAGS: No results found for: \"PTT\", \"INR\", \"FIBR\"  POC:   Lab Results   Component Value Date     (H) 09/21/2018     HEPATIC:   Lab Results   Component Value Date    ALBUMIN 2.6 (L) 03/22/2025    PROTTOTAL 6.6 03/22/2025    ALT 10 03/22/2025    AST 21 03/22/2025    ALKPHOS 111 03/22/2025    BILITOTAL 0.6 03/22/2025     OTHER:   Lab Results   Component Value Date    PH 7.05 (LL) 03/23/2025    LACT 1.8 03/22/2025    A1C 12.7 (H) 03/22/2025    NORM 8.9 03/23/2025    PHOS 5.7 (H) 03/23/2025    MAG 2.0 03/22/2025    TSH 1.18 03/18/2025    CRP <2.9 07/01/2019       Anesthesia Plan    ASA Status:  4, emergent    NPO Status:  NPO Appropriate    Anesthesia Type: General.     - Airway: ETT   Induction: Intravenous, RSI, Propofol.      Techniques and Equipment:     - Lines/Monitors: Central Line     Consents    Anesthesia Plan(s) and associated risks, benefits, and realistic alternatives discussed. Questions answered and patient/representative(s) expressed understanding.     - Discussed:     - Discussed with:  " "Implied consent/emergency            Postoperative Care            Comments:               Jean Claude Parikh, APRN CRNA    Clinically Significant Risk Factors Present on Admission         # Hyponatremia: Lowest Na = 134 mmol/L in last 2 days, will monitor as appropriate  # Hypochloremia: Lowest Cl = 96 mmol/L in last 2 days, will monitor as appropriate   # Hypercalcemia: corrected calcium is >10.1, will monitor as appropriate    # Hypoalbuminemia: Lowest albumin = 2.6 g/dL at 3/22/2025  5:31 PM, will monitor as appropriate   # Drug Induced Platelet Defect: home medication list includes an antiplatelet medication  # Acute Kidney Injury, unspecified: based on a >150% or 0.3 mg/dL increase in last creatinine compared to past 90 day average, will monitor renal function  # Hypertension: Noted on problem list   # Circulatory Shock: required vasopressors within past 24 hours      # Acute Hypercapnic Respiratory Failure: based on arterial blood gas results.  Continue supplemental oxygen and ventilatory support as indicated.       # DMII: A1C = 12.7 % (Ref range: <5.7 %) within past 6 months   # Obesity: Estimated body mass index is 33.16 kg/m  as calculated from the following:    Height as of this encounter: 1.651 m (5' 5\").    Weight as of this encounter: 90.4 kg (199 lb 4.7 oz).       # COPD: noted on problem list          "

## 2025-03-23 NOTE — PROVIDER NOTIFICATION
Provider notified: Patient is continuing to flip from ST 100s to Afib -160s, is now in SVT 130s.    Provider calling in to see patient, cardizem drip initiated.    0410: Patient has yet to void up here. Unable to know how much they straight cath for in the ED as they did not chart it. I just bladder scanned for only 153mL.    Weaned patient down to 5mg/hr on the dilt drip. Has been NSR 80-90s since 0200. Do you want me to trial the drip off?    Order to discontinue dilt drip, PO cardizem ordered.    0600: HR back up to 130-150s, gave PO cardizem at 0514. /59 map 65    One time dose of IV cardizem ordered

## 2025-03-23 NOTE — ED NOTES
Face to face report given with opportunity to observe patient.    Report given to Scott Cummings RN   3/22/2025  7:07 PM

## 2025-03-23 NOTE — PLAN OF CARE
Goal Outcome Evaluation:       Plan of Care Reviewed With: patient    Overall Patient Progress: progressing    Pt lethargic upon admission, however has become more alert throughout the shift. Disoriented to place and time, intermittent confusion to situation. Heart rate tachycardic with runs of SVT and Afib RVR (see provider notification note), resolved around 0200 and diltiazem gtt titrated off. Blood pressures stable. Tmax 100. Denied pain at rest, does have pain to left groin and abd where inflammation and excoriation are present to touch. On 4L O2, O2 sats 90-92%. Tachypneic in the 30s. LS with expiratory wheezing to upper lobes. BS hypoactive, abd soft, rounded. Pt unsure on last BM. Redness and lesions present to perineal area. Pt due to void, bladder scanned for 153 mL, provider notified. Blanchable redness to buttocks, turned and repositioned q2H. BG stable, insulin gtt turned off at 0318 and transitioned to subcutaneous insulin. IV SL. IV Zosyn given. Call light within reach, alarms active.     Face to face report given with opportunity to observe patient.    Report given to KHADIJAH Dumont RN   3/23/2025  7:29 AM

## 2025-03-23 NOTE — PLAN OF CARE
[facility] Inpatient Admission Note:    Patient admitted to 3128/3128-1 at approximately 2300 via cart accompanied by transport tech and nurse from emergency room . Report received from KHADIJAH Chavez in SBAR format at 2250 via telephone. Patient transferred to bed via slide board.. Patient is alert and oriented X 2, denies pain; rates at 0 on 0-10 scale.  Patient oriented to room, unit, hourly rounding, and plan of care. Explained admission packet and patient handbook with patient bill of rights brochure. Will continue to monitor and document as needed.     Inpatient Nursing criteria listed below was met:    Health care directives status obtained and documented: Yes    Patient identifies a surrogate decision maker: Yes If yes, who: Tavo Manzo Contact Information: [phone]     If initial lactic acid greater than 2.0, repeat lactic acid drawn within one hour of arrival to unit: NA.     Clergy visit ordered if patient requests: N/A    Skin issues/needs documented: Yes    Isolation Patient: no     Fall Prevention Yes: Care plan updated, education given and documented, sticker and magnet in place: Yes    Care Plan initiated: Yes    Education Documented (including assessment): Yes    Patient has discharge needs : Yes If yes, please explain: TBD

## 2025-03-23 NOTE — ED NOTES
Patient had a decrease in mentation. HR in 130s, ashen/grey in color.   C/O chest pain but unable to elaborate.   Provider to bedside.   STAT pads placed per provider.   RT called.

## 2025-03-23 NOTE — ANESTHESIA PROCEDURE NOTES
Central Line/PA Catheter Placement    Pre-Procedure   Staff -        CRNA: Jean Claude Parikh APRN CRNA       Performed By: CRNA       Location: ICU       Pre-Anesthestic Checklist: patient identified, IV checked, site marked, risks and benefits discussed, informed consent, monitors and equipment checked, pre-op evaluation and at physician/surgeon's request  Timeout:       Correct Patient: Yes        Correct Procedure: Yes        Correct Site: Yes        Correct Position: Yes        Correct Laterality: Yes   Line Placement:   This line was placed Post Induction starting at 3/23/2025 5:30 PM and ending at 3/23/2025 5:50 PM    Procedure   Procedure: central line       Diagnosis: respiratory failure/possible sepsis       Laterality: right       Insertion Site: internal jugular.       Patient Position: Trendelenburg  Sterile Prep        All elements of maximal sterile barrier technique followed       Patient Prep/Sterile Barriers: draped, hand hygiene, gloves , hat , mask , draped, gown, sterile gel and probe cover       Skin prep: Chloraprep  Insertion/Injection        Technique: ultrasound guided        1. Ultrasound was used to evaluate the access site.       2. Vein evaluated via ultrasound for patency/adequacy.       3. Using real-time ultrasound the needle/catheter was observed entering the artery/vein.       4. Permanent image was captured and entered into the patient's record.       5. The visualized structures were anatomically normal.       6. There were no apparent abnormal pathologic findings.       Type: CVC       Catheter Size: 7 Fr       Number of Lumens: triple lumen  Narrative         Secured by: suture and anchor securement device       Tegaderm and Biopatch dressing used.       Complications: None apparent,        blood aspirated from all lumens,        All lumens flushed: Yes       Verification method: X-ray, Ultrasound and Placement to be verified post-op       Tip termination: The catheter  tip was threaded to reside in the superior vena cava subject to post surgical x-ray

## 2025-03-23 NOTE — PROGRESS NOTES
I was called to patient's bedside at approximately 4:30 PM due to increased hypoxia while on the BiPAP.  Adjustments were weight to the BiPAP from 10 over 5-14/5.  O2 was at 70%.  Case was discussed with telemetry ICU.  Concerned that she may have additional clot burden in her lower extremities and is throwing additional pulmonary emboli.  Recommendation for echocardiogram was given in discussion with telemetry ICU however we do not have that capability on weekends and that has been ordered for tomorrow.  Lower extremity ultrasounds have also been ordered once again for tomorrow.  Consideration for CT of the head and a repeat PE study likely warranted over decision was made to wait until the patient is stable.    ABG then returned with a pH of 7.05 and a pCO2 of 103.  Decision was made at that time to intubate her.  Patient was also found to be hypotensive.  She was placed on peripheral Levophed.  Intubated and central line was placed.  2 boluses of normal saline 100 mL also ordered.  Patient will be sedated with fentanyl and Versed.  Repeat ABG has been requested at this time.    Gentry Saez, DO

## 2025-03-23 NOTE — ANESTHESIA PROCEDURE NOTES
Airway       Patient location during procedure: OR       Procedure Start/Stop Times: 3/23/2025 5:20 PM and 3/23/2025 5:20 PM  Staff -        CRNA: Jean Claude Parikh APRN CRNA       Performed By: CRNA  Consent for Airway        Urgency: elective  Indications and Patient Condition       Indications for airway management: reji-procedural       Induction type:intravenous       Mask difficulty assessment: 0 - not attempted    Final Airway Details       Final airway type: endotracheal airway       Successful airway: ETT - single and Oral  Endotracheal Airway Details        ETT size (mm): 7.5       Cuffed: yes       Successful intubation technique: direct laryngoscopy       DL Blade Type: MAC 3       Grade View of Cords: 2       Adjucts: stylet       Position: Right       Measured from: lips       Secured at (cm): 21       Bite block used: None    Post intubation assessment        Placement verified by: capnometry, equal breath sounds and chest rise        Number of attempts at approach: 1       Secured with: plastic tape       Ease of procedure: easy       Dentition: Intact and Unchanged    Medication(s) Administered   Medication Administration Time: 3/23/2025 5:20 PM

## 2025-03-23 NOTE — ANESTHESIA POSTPROCEDURE EVALUATION
Patient: Marly Cannon    Procedure: * No procedures listed *       Anesthesia Type:  General    Note:  Disposition: Inpatient; ICU            ICU Sign Out: Anesthesiologist/ICU physician sign out WAS performed   Postop Pain Control: Uneventful            Sign Out: Well controlled pain   PONV: No   Neuro/Psych: Uneventful            Sign Out: Acceptable/Baseline neuro status   Airway/Respiratory: Uneventful            Sign Out: AIRWAY IN SITU/Resp. Support               Airway in situ/Resp. Support: ETT; Ongoing HYPOXIA                 Reason: Unplanned   CV/Hemodynamics: Uneventful            Sign Out: Acceptable CV status; No obvious hypovolemia; No obvious fluid overload   Other NRE: NONE   DID A NON-ROUTINE EVENT OCCUR? No         Last vitals:  Vitals:    03/23/25 1430 03/23/25 1500 03/23/25 1530   BP: 118/57 104/50 102/58   Pulse: 79 81 81   Resp:  25 16   Temp:      SpO2: 97% 98% 97%       Electronically Signed By: BLAIR Martinez CRNA  March 23, 2025  6:14 PM

## 2025-03-23 NOTE — H&P
Admit Date: 3/22/2025     History and Physical: Hospitalist Service    Chief Complaints:  high blood sugars with shortness of breath    HPI:  59 years old female with a past medical history of hypertension, diabetes mellitus type 2, pulmonary emphysema, intellectual disability with memory issues, hyperparathyroidism and multiple other medical issues was brought to the emergency room for high blood sugars with shortness of breath.  Complains of pain generalized symptoms in the chest but difficult to clarify.  Not a good historian.  Most of the history I also has been obtained from chart/caregivers.  In the ED was noted to be tachycardic in the 130s and a subsequent EKG was concerning for SVT.  Repeat EKG had shown A-fib with rapid ventricular response.  COVID influenza and RSV is negative.  CBC showed a white count of 16.4 with a hemoglobin of 16.8 and a platelet count of 260.  CMP showed sodium 134 potassium 4.5 BUN of 48.1 with a creatinine of 1.32.  AST/ALT was 21/10.  VBG showed a pH of 1.39 with a pCO2 of 38 and a bicarb of 23.  Lactic acid is 1.8.  Ketones was 1.89.  Follow-up CT chest shows a small pulmonary artery embolism to the subsegmental branches of the left lower lobe with no right heart strain.  Patient was initiated on therapeutic Lovenox in addition to insulin drip for possible diabetic ketoacidosis.  Cardizem infusion was initiated for SVT/A-fib.  She was admitted for further evaluation    Review of symptoms:  12 point review of system is negative unless otherwise stated in history of present illness    Clinically Significant Risk Factors Present on Admission         # Hyponatremia: Lowest Na = 134 mmol/L in last 2 days, will monitor as appropriate  # Hypochloremia: Lowest Cl = 96 mmol/L in last 2 days, will monitor as appropriate      # Hypoalbuminemia: Lowest albumin = 2.6 g/dL at 3/22/2025  5:31 PM, will monitor as appropriate   # Drug Induced Platelet Defect: home medication list includes an  "antiplatelet medication   # Hypertension: Noted on problem list          # DMII: A1C = 12.7 % (Ref range: <5.7 %) within past 6 months   # Obesity: Estimated body mass index is 34.21 kg/m  as calculated from the following:    Height as of 3/18/25: 1.626 m (5' 4\").    Weight as of this encounter: 90.4 kg (199 lb 4.7 oz).       # COPD: noted on problem list           History reviewed. No pertinent past medical history.    Active Problems:    Diabetic ketosis (H)    PAF (paroxysmal atrial fibrillation) (H)    Pneumonia    Hyperglycemia    Acute and chronic respiratory failure with hypoxia (H)        Current Facility-Administered Medications:     0.45% sodium chloride + KCl 20 mEq/L infusion, , Intravenous, Continuous, Te Parish MD, Last Rate: 100 mL/hr at 03/22/25 2311, New Bag at 03/22/25 2311    glucose gel 15-30 g, 15-30 g, Oral, Q15 Min PRN **OR** dextrose 50 % injection 25-50 mL, 25-50 mL, Intravenous, Q15 Min PRN **OR** glucagon injection 1 mg, 1 mg, Subcutaneous, Q15 Min PRN, Te Parish MD    [START ON 3/23/2025] diltiazem (CARDIZEM) 125 mg in dextrose 5 % 125 mL infusion, 5-15 mg/hr, Intravenous, Continuous, Te Parish MD    insulin regular (MYXREDLIN) 1 unit/mL infusion, , Intravenous, Continuous, Te Parish MD, Last Rate: 4 mL/hr at 03/22/25 2313, New Bag at 03/22/25 2313    Patient is already receiving anticoagulation with heparin, enoxaparin (LOVENOX), warfarin (COUMADIN)  or other anticoagulant medication, , Does not apply, Continuous PRN, Te Parish MD    [START ON 3/23/2025] piperacillin-tazobactam (ZOSYN) 3.375 g vial to attach to  mL bag, 3.375 g, Intravenous, Q6H, Te Parish MD    Past Surgical History:   Procedure Laterality Date    BIOPSY BREAST Left 02/14/2008    patient states no bx, Clip in left breast/ stereo bxc 2-- no path in [facility] that far back    COLONOSCOPY N/A 08/20/2015    Procedure: COLONOSCOPY;  " Surgeon: Gentry Porter MD;  Location: [facility]    COLONOSCOPY N/A 09/21/2018    Procedure: COLONOSCOPY;  COLONOSCOPY WITH POLYPECTOMY;  Surgeon: Gentry Porter MD;  Location: [facility] OR       No Known Allergies    Family History   Problem Relation Age of Onset    Diabetes Mother     Diabetes Father     Hypertension Brother     Diabetes Sister        Social History     Socioeconomic History    Marital status:      Spouse name: None    Number of children: None    Years of education: None    Highest education level: None   Tobacco Use    Smoking status: Former     Current packs/day: 1.00     Average packs/day: 1 pack/day for 46.2 years (46.2 ttl pk-yrs)     Types: Cigarettes     Start date: 1/1/1979     Passive exposure: Current    Smokeless tobacco: Never    Tobacco comments:     Declined QP 05/13/2020   Vaping Use    Vaping status: Never Used   Substance and Sexual Activity    Alcohol use: No     Alcohol/week: 0.0 standard drinks of alcohol    Drug use: No    Sexual activity: Yes   Social History Narrative    4/24/2019: living with boyfriend, does not work-was working at [employer], 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     Social Drivers of Health     Financial Resource Strain: Low Risk  (3/18/2025)    Financial Resource Strain     Within the past 12 months, have you or your family members you live with been unable to get utilities (heat, electricity) when it was really needed?: No   Food Insecurity: Low Risk  (3/18/2025)    Food Insecurity     Within the past 12 months, did you worry that your food would run out before you got money to buy more?: No     Within the past 12 months, did the food you bought just not last and you didn t have money to get more?: No   Transportation Needs: Low Risk  (3/18/2025)    Transportation Needs     Within the past 12 months, has lack of transportation kept you from medical appointments, getting your medicines, non-medical meetings or appointments, work, or from getting  things that you need?: No   Physical Activity: Unknown (2024)    Exercise Vital Sign     Days of Exercise per Week: 0 days   Stress: No Stress Concern Present (2024)    [employer] [place] [employer] - Occupational Stress Questionnaire     Feeling of Stress : Not at all   Social Connections: Unknown (2024)    Social Connection and Isolation Panel [NHANES]     Frequency of Social Gatherings with Friends and Family: More than three times a week   Interpersonal Safety: Low Risk  (3/18/2025)    Interpersonal Safety     Do you feel physically and emotionally safe where you currently live?: Yes     Within the past 12 months, have you been hit, slapped, kicked or otherwise physically hurt by someone?: No     Within the past 12 months, have you been humiliated or emotionally abused in other ways by your partner or ex-partner?: No   Housing Stability: Low Risk  (3/18/2025)    Housing Stability     Do you have housing? : Yes     Are you worried about losing your housing?: No       Physical Exam:  Vitals:    25 2115 25 2130 25 2145 25 2317   BP: (!) 119/95 (!) 117/91 (!) 124/97    Pulse: 110 108 111    Resp:       Temp:    100  F (37.8  C)   TempSrc:       SpO2:  96% 97%    Weight:    90.4 kg (199 lb 4.7 oz)      BP (!) 124/97   Pulse 111   Temp 100  F (37.8  C)   Resp (!) 38   Wt 90.4 kg (199 lb 4.7 oz)   SpO2 97%   BMI 34.21 kg/m    Systolic (24hrs), Av , Min:76 , Max:144   Diastolic (24hrs), Av, Min:37, Max:97    No intake or output data in the 24 hours ending 25 2332    General:      not in pain  Head:  atraumatic, normocephalic, face symmetrical  Eye:  conjunctivae clear , anicteric  Ear:  normal external ears, grossly normal hearing  Neck:  supple, no JVD  Respiratory:  no respiratory distress, no labored respirations, no shortness of breath,  Lung Sounds:air entry decreased at base  Cardiovascular:normal rate, regular rhythm, PMI normal, S1 - normal,  "S2 - normal splitting  Neurological Status:  alert, awake, oriented to person, oriented to place,   Skin:  normal turgor, warm          I&O: No intake/output data recorded.    Lab Results:   CBC:   Lab Results   Component Value Date    WBC 16.4 03/22/2025    WBC 7.5 11/09/2020    RBC 5.44 03/22/2025    RBC 5.60 11/09/2020     BMP:   Lab Results   Component Value Date    POTASSIUM 4.3 03/22/2025    POTASSIUM 4.1 07/15/2022    POTASSIUM 4.7 06/23/2021    CHLORIDE 96 03/22/2025    CHLORIDE 103 07/15/2022    CHLORIDE 106 06/23/2021    BUN 48.1 03/22/2025    BUN 17 07/15/2022    BUN 27 06/23/2021     CMP:  Lab Results   Component Value Date    POTASSIUM 4.3 03/22/2025    POTASSIUM 4.1 07/15/2022    POTASSIUM 4.7 06/23/2021    CHLORIDE 96 03/22/2025    CHLORIDE 103 07/15/2022    CHLORIDE 106 06/23/2021    ANIONGAP 17 03/22/2025    ANIONGAP 3 07/15/2022    ANIONGAP 14 06/23/2021    BUN 48.1 03/22/2025    BUN 17 07/15/2022    BUN 27 06/23/2021    ALBUMIN 2.6 03/22/2025    ALBUMIN 3.5 07/15/2022    ALBUMIN 3.6 05/10/2021    AST 21 03/22/2025    AST 12 05/10/2021     Coagulation: No results found for: \"INR\", \"PTT\"  Cardiac markers: No results found for: \"TROPONINI\"  ABGs: No results found for: \"PHARTERIAL\"  Mg: No components found for: \"MAGNESIUM\"  BNP: No results found for: \"BNP\"  Thyroid:   Lab Results   Component Value Date    TSH 1.18 03/18/2025    TSH 0.97 12/04/2019               Assessment/Plan:    59 years old female with a past medical history of hypertension, diabetes mellitus type 2, pulmonary emphysema, intellectual disability with memory issues, hyperparathyroidism and multiple other medical issues was brought to the emergency room for high blood sugars with shortness of breath.  Complains of pain generalized symptoms in the chest but difficult to clarify.  Not a good historian.  Most of the history I also has been obtained from chart/caregivers.  In the ED was noted to be tachycardic in the 130s and a subsequent " EKG was concerning for SVT.  Repeat EKG had shown A-fib with rapid ventricular response.  COVID influenza and RSV is negative.  CBC showed a white count of 16.4 with a hemoglobin of 16.8 and a platelet count of 260.  CMP showed sodium 134 potassium 4.5 BUN of 48.1 with a creatinine of 1.32.  AST/ALT was 21/10.  VBG showed a pH of 1.39 with a pCO2 of 38 and a bicarb of 23.  Lactic acid is 1.8.  Ketones was 1.89.  Follow-up CT chest shows a small pulmonary artery embolism to the subsegmental branches of the left lower lobe with no right heart strain.  Patient was initiated on therapeutic Lovenox in addition to insulin drip for possible diabetic ketoacidosis.  Cardizem infusion was initiated for SVT/A-fib.  She was admitted for further evaluation    1 uncontrolled diabetes mellitus type 2 insulin-dependent.  Unclear if there was an issue with insulin administration.  Blood sugars was in the mid 400s with a gap of 17 and a CO2 of 21 initially.  Insulin drip for now with IV fluids while trending the gap closely.  Wean off the drip once the gap normalizes.  Resume the home Lantus once the blood sugars are better controlled and also consult diabetes education for further input    2 paroxysmal atrial fibrillation with rapid ventricular rate/SVT.  Continue the Cardizem drip initiated in the emergency room and currently on therapeutic Lovenox.  Monitoring the telemetry and follow-up with an echocardiogram.  Transition to oral Cardizem based on tolerance    3 pulmonary embolism subsegmental with no right heart strain.  Continue with therapeutic Lovenox initiated in emergency room.  To review with discharge planning to check the appropriate available anticoagulant on discharge that is covered by her insurance.  Check a Doppler of the lower extremity    4 pneumonia with concerns for aspiration.  Continue Zosyn for now and consult speech for further evaluation.  Follow cultures    5 emphysema chronic.  Resume the home  inhalers    6 dyslipidemia resume home statin      Our patient will be admitted under the hospitalist services and further management to be based on patient's clinical progress.    As the provider for the telehealth service, I attest that I introduced myself to the patient and provided my credentials. Based on review of the patient s chart and/or a discussion with members of the patient s treatment team, I determined that telemedicine via a real-time, two-way, interactive audio and video platform is an appropriate means of providing this service. The patient and I mutually agreed that this visit is appropriate for telemedicine as well.    The patient was located at [facility].  . The encounter was approximately 10 minutes. The nurse was present for the duration of the encounter. Provider location :remote [place] [facility] site     Chart reviewed,labs and available imaging reviewed,consultant notes reviewed. Previous available medical records have been reviewed  Care plan discussed with care team    I have seen and examined the patient today. Documentation for this visit was completed using a template. Everything documented was personally performed today with the necessary additions, deletions and changes made as appropriate with the diagnoses being listed in no particular order of clinical relevance.    Agata Arriola MD MPh  Securely message with the Vocera Web Console (learn more here)  Text page via [employer] Paging/Directory

## 2025-03-23 NOTE — PROGRESS NOTES
Patient's  Tavo was called and updated as to her status now requiring intubation and pressors.  I did inform him that I am speculating that she has more clot burden in her lower extremities and may have had another PE.     Gentry Saez, DO

## 2025-03-23 NOTE — PROGRESS NOTES
Placed on BiPAP 10/5 BUR 12 and FiO2 80%. SpO2 91% and coarse bs bilaterally. Will wean O2 as able.

## 2025-03-23 NOTE — PROGRESS NOTES
[facility]    Hospitalist Progress Note    Date of Service (when I saw the patient): 03/23/2025    Assessment & Plan   Marly Cannon is a 59 year old female who was admitted on 3/22/2025.     Uncontrolled diabetes mellitus type 2, insulin-dependent: This is despite insulin pump.  Patient had functioning insulin pump on 3/20/2025 when she was discharged from this facility.  Anion gap is 17, but blood glucoses have been in the 200s.  Serum ketones resolved for a time, but now have returned.  Ketones uptrending  -3/23: Insulin pump removed.  Was given long-acting insulin 20 units of Toujeo this a.m., ketones continuing to uptrend.  Will place her back on insulin drip at this time.    A-fib RVR: Patient is initial EKG with A-fib RVR with heart rate in the 150s.  Patient received diltiazem, now she is converted.  He is on p.o. diltiazem 60 mg every 8 hours scheduled.  -Patient currently normal sinus rhythm  -Will continue diltiazem  -Echocardiogram is ordered  -Anticoagulated with Lovenox    Pulmonary embolism: Subsegmental with no right heart strain at least on CT.  Patient given therapeutic Lovenox in the emergency room.  -Echocardiogram ordered  -Will likely convert her to DOAC prior to discharge for this and for A-fib    YDAIRA: Creatinine rising since admission, now 1.64.  Previously slightly above 1, on presentation was 1.3.  Patient did receive contrast.  -Will give IV fluid bolus  -Monitoring for renal function and urine output    Suspected pneumonia: CTA chest shows clear lungs, however patient tachypneic, increased work of breathing.  Oxygen requirement likely due to COPD.  -Continuing Zosyn  -Continue to monitor respiratory status    COPD: Oxygen dependence established last admission.  Patient was discharged on 2 L nasal cannula.  -Supplemental oxygen as needed, wean as able    FEN: Diabetic diet as tolerated.    Clinically Significant Risk Factors Present on Admission         # Hyponatremia: Lowest  "Na = 134 mmol/L in last 2 days, will monitor as appropriate  # Hypochloremia: Lowest Cl = 96 mmol/L in last 2 days, will monitor as appropriate   # Hypercalcemia: corrected calcium is >10.1, will monitor as appropriate    # Hypoalbuminemia: Lowest albumin = 2.6 g/dL at 3/22/2025  5:31 PM, will monitor as appropriate   # Drug Induced Platelet Defect: home medication list includes an antiplatelet medication  # Acute Kidney Injury, unspecified: based on a >150% or 0.3 mg/dL increase in last creatinine compared to past 90 day average, will monitor renal function  # Hypertension: Noted on problem list          # DMII: A1C = 12.7 % (Ref range: <5.7 %) within past 6 months   # Obesity: Estimated body mass index is 33.16 kg/m  as calculated from the following:    Height as of this encounter: 1.651 m (5' 5\").    Weight as of this encounter: 90.4 kg (199 lb 4.7 oz).       # COPD: noted on problem list        DVT Prophylaxis: Enoxaparin (Lovenox) SQ    Code Status: Full Code    Disposition: Expected discharge once sufficient discharge plan established, conversion over to oral anticoagulation.    Bibi Cancino MD, MD        Interval History   Patient seen in room.  Patient is somewhat somnolent.  She does respond to voice, but quickly falls asleep again.  Appears to be using accessory muscles, but is not in distress, no agitation.    -Data reviewed today: I reviewed all new labs and imaging results over the last 24 hours. I personally reviewed imaging reports.    Physical Exam   Temp: 97.3  F (36.3  C) Temp src: Tympanic BP: 117/65 Pulse: 89   Resp: 26 SpO2: (!) 88 % O2 Device: Nasal cannula Oxygen Delivery: 4 LPM  Vitals:    03/22/25 2317 03/23/25 0500   Weight: 90.4 kg (199 lb 4.7 oz) 90.4 kg (199 lb 4.7 oz)     Vital Signs with Ranges  Temp:  [97.3  F (36.3  C)-100  F (37.8  C)] 97.3  F (36.3  C)  Pulse:  [] 89  Resp:  [12-38] 26  BP: ()/(37-97) 117/65  SpO2:  [88 %-97 %] 88 %    Intake/Output Summary (Last 24 " hours) at 3/23/2025 0807  Last data filed at 3/23/2025 0432  Gross per 24 hour   Intake 453.6 ml   Output --   Net 453.6 ml       Peripheral IV 03/22/25 Anterior;Left Upper forearm (Active)   Site Assessment WDL 03/23/25 0430   Line Status Saline locked 03/23/25 0430   Dressing Transparent 03/23/25 0430   Dressing Status clean;dry;intact 03/23/25 0430   Line Intervention Flushed 03/23/25 0430   Phlebitis Scale 0-->no symptoms 03/23/25 0430   Infiltration? no 03/23/25 0430   Number of days: 1       Peripheral IV 03/22/25 Anterior;Distal;Left Lower forearm (Active)   Site Assessment [facility] 03/23/25 0430   Line Status Saline locked 03/23/25 0430   Dressing Transparent 03/23/25 0430   Dressing Status clean;intact;dry 03/23/25 0430   Line Intervention Flushed 03/23/25 0430   Phlebitis Scale 0-->no symptoms 03/23/25 0430   Infiltration? no 03/23/25 0430   Number of days: 1       Peripheral IV 03/23/25 Left;Posterior Wrist (Active)   Site Assessment [facility] 03/23/25 0400   Line Status Saline locked 03/23/25 0400   Dressing Transparent 03/23/25 0400   Dressing Status clean;dry;intact 03/22/25 2330   Dressing Intervention New dressing  03/23/25 0200   Line Intervention Flushed 03/23/25 0400   Phlebitis Scale 0-->no symptoms 03/23/25 0400   Infiltration? no 03/23/25 0400   Number of days: 0     No line/device    Constitutional - somnolent, no distress, obese  HEENT - atraumatic, normocephalic  Neck - supple, no masses, no JVD  CVS - S1 S2 RRR, no murmurs, rubs, gallops  Respiratory - employing accessory muscles slightly, otherwise clear bilaterally  GI - soft, NT, ND, + bowel sounds, no organomegaly  Musculoskeletal - no LE edema, no lesions  Neuro - somnolent, no gross focal deficits      Medications   Current Facility-Administered Medications   Medication Dose Route Frequency Provider Last Rate Last Admin    Patient is already receiving anticoagulation with heparin, enoxaparin (LOVENOX), warfarin (COUMADIN)  or other anticoagulant  medication   Does not apply Continuous PRN Te Parish MD         Current Facility-Administered Medications   Medication Dose Route Frequency Provider Last Rate Last Admin    aspirin (ASA) chewable tablet 81 mg  81 mg Oral Daily Te Parish MD        atorvastatin (LIPITOR) tablet 40 mg  40 mg Oral At Bedtime Te Parish MD        cholecalciferol (VITAMIN D3) capsule 25 mcg  25 mcg Oral Daily Te Parish MD        diltiazem (CARDIZEM) tablet 60 mg  60 mg Oral Q8H [facility] Te Parish MD   60 mg at 03/23/25 0514    empagliflozin (JARDIANCE) tablet 25 mg  25 mg Oral Daily Te Parish MD        enoxaparin ANTICOAGULANT (LOVENOX) injection 70 mg  0.75 mg/kg Subcutaneous Q12H Te Parish MD        insulin aspart (NovoLOG) injection (RAPID ACTING)  1-7 Units Subcutaneous Q4H Te Parish MD   1 Units at 03/23/25 0400    insulin glargine U-300 (TOUJEO) injection 20 Units  20 Units Subcutaneous QAM Te Parish MD        pantoprazole sodium (PROTONIX) packet 40 mg  40 mg Oral QAM AC Te Parish MD        piperacillin-tazobactam (ZOSYN) 3.375 g vial to attach to  mL bag  3.375 g Intravenous Q6H Te Parish MD   3.375 g at 03/23/25 0350    primidone (MYSOLINE) tablet 50 mg  50 mg Oral At Bedtime Te Parish MD           Data   Recent Labs   Lab 03/23/25  0607 03/23/25  0345 03/23/25  0303 03/23/25  0207 03/22/25  2311 03/22/25  2241 03/22/25  1905 03/22/25  1731 03/20/25  0709 03/20/25  0604 03/19/25  0711 03/19/25  0518 03/18/25  1352 03/18/25  1209   WBC  --   --   --   --   --   --   --  16.4*  --  13.7*  --  14.9*  --  15.9*   HGB  --   --   --   --   --   --   --  16.8*  --  17.5*  --  16.6*  --  18.7*   MCV  --   --   --   --   --   --   --  94  --  96  --  95  --  92   PLT  --   --   --   --   --   --   --  260  --  232  --  192  --  236     --   --  135  --  136  --  134*  --  141  --   142  --  136   POTASSIUM 4.6  --   --  5.3  --  4.3  4.3  --  4.5  --  4.3  --  4.3  --  5.0   CHLORIDE 103  --   --  102  --  99  --  96*  --  104  --  106  --  95*   CO2 21*  --   --  22  --  21*  --  21*  --  24  --  25  --  27   BUN 52.7*  --   --  48.2*  --  48.5*  --  48.1*  --  24.6*  --  18.5  --  21.5   CR 1.64*  --   --  1.37*  --  1.38*  --  1.32*  --  1.09*  --  1.08*  --  1.37*   ANIONGAP 17*  --   --  11  --  16*  --  17*  --  13  --  11  --  14   NORM 9.3  --   --  9.2  --  9.4  --  9.0  --  9.4  --  8.9  --  9.3   * 154* 157* 224*   < > 337*  337*   < > 460*   < > 173*   < > 162*   < > 497*   ALBUMIN  --   --   --   --   --   --   --  2.6*  --   --   --   --   --  4.0   PROTTOTAL  --   --   --   --   --   --   --  6.6  --   --   --   --   --  7.7   BILITOTAL  --   --   --   --   --   --   --  0.6  --   --   --   --   --  1.0   ALKPHOS  --   --   --   --   --   --   --  111  --   --   --   --   --  122   ALT  --   --   --   --   --   --   --  10  --   --   --   --   --  10   AST  --   --   --   --   --   --   --  21  --   --   --   --   --  13    < > = values in this interval not displayed.     Lactic Acid, Initial   Date Value Ref Range Status   03/22/2025 1.8 0.7 - 2.0 mmol/L Final       Recent Results (from the past 24 hours)   XR Chest 2 Views    Narrative    EXAM: XR CHEST 2 VIEWS  LOCATION: [facility]  DATE: 3/22/2025    INDICATION: Shortness of breath  COMPARISON: Chest radiograph 3/17/2025.      Impression    IMPRESSION: Stable size of cardiomediastinal silhouette. Questionable new retrocardiac opacity, differential includes atelectasis, aspiration or developing pneumonia. No definite pleural effusion or pneumothorax. Bones are unchanged.   CTA Chest with Contrast   Result Value    Radiologist flags New diagnosis of pulmonary embolism (AA)    Narrative    EXAM: CTA CHEST WITH CONTRAST  LOCATION: [facility]  DATE: 3/22/2025    INDICATION: R O PE, shortness of  breath  COMPARISON: Same day 2 view chest radiograph, CT chest without contrast 07/21/2023  TECHNIQUE: Helical acquisition through the chest was performed during the arterial phase of contrast enhancement. 2D and 3D reconstructions performed by the CT technologist. Dose reduction techniques were used.  CONTRAST: Isovue 370 66mL    CT ANGIOGRAM CHEST: Pulmonary arteries are normal in size. Very small pulmonary artery emboli to subsegmental branches of the left lower lobe. No right heart strain.    LUNGS AND PLEURA: Mild emphysematous changes of the lungs. No focal pulmonary consolidation or pleural effusion.    MEDIASTINUM/AXILLAE: Atherosclerotic calcifications of the aortic arch and descending thoracic aorta. No lymphadenopathy. Trace pericardial effusion. Small sliding-type hiatal hernia.    CORONARY ARTERY CALCIFICATION: Mild.    UPPER ABDOMEN: Mildly thickened adrenal glands.    MUSCULOSKELETAL: Degenerative changes of the shoulders and thoracic spine. No acute osseous abnormality.      Impression    IMPRESSION:  1.  Very small pulmonary artery emboli to subsegmental branches of the left lower lobe. No right heart strain.  2.  Mild emphysematous changes of the lungs. No focal pulmonary consolidation or pleural effusion.  3.  Trace pericardial effusion.  4.  Small sliding-type hiatal hernia.      [Critical Result: New diagnosis of pulmonary embolism]    Finding was identified on 3/22/2025 9:51 PM CDT.     Dr. Andrez Rob was contacted by me on 3/22/2025 10:03 PM CDT and verbalized understanding of the critical result.         Bibi Cancino MD

## 2025-03-23 NOTE — PROGRESS NOTES
"Tele ICU    Called by hospitalist regarding worsening respiratory status.      She is there for DKA, noted to have very small PE yesterday.  Now with worsening gas exchange and \"guppy breathing\" according to the hospitalist.  Currently on NIPPV.      ABG demonstrates worsened hypercapnia compared to yesterday.  Now on 80% FiO2  CXR reviewed.  Clear lungs.    Worsening renal function    She is now on enoxaparin.        I would be concerned about a large PE causing worsening dead space vs a possible brain bleed now that she is on anticoagulation.      I recommend repeat CT PE protocol, ECHO, BNP, troponin, ECG, and a head CT.      With the renal failure, it may be a good idea to switch to heparin instead of enoxaparin (that is if there is not intracranial hemorrhage).     Strongly consider intubation .      I did not camera in, but discussed the case with the hospitalist     Delvis Bhagat MD      "

## 2025-03-24 ENCOUNTER — APPOINTMENT (OUTPATIENT)
Dept: CT IMAGING | Facility: HOSPITAL | Age: 60
DRG: 870 | End: 2025-03-24
Attending: INTERNAL MEDICINE
Payer: COMMERCIAL

## 2025-03-24 ENCOUNTER — APPOINTMENT (OUTPATIENT)
Dept: CARDIOLOGY | Facility: HOSPITAL | Age: 60
DRG: 870 | End: 2025-03-24
Attending: HOSPITALIST
Payer: COMMERCIAL

## 2025-03-24 ENCOUNTER — APPOINTMENT (OUTPATIENT)
Dept: GENERAL RADIOLOGY | Facility: HOSPITAL | Age: 60
DRG: 870 | End: 2025-03-24
Attending: INTERNAL MEDICINE
Payer: COMMERCIAL

## 2025-03-24 LAB
ALBUMIN SERPL BCG-MCNC: 1.9 G/DL (ref 3.5–5.2)
ALLEN'S TEST: ABNORMAL
ALP SERPL-CCNC: 120 U/L (ref 40–150)
ALT SERPL W P-5'-P-CCNC: 16 U/L (ref 0–50)
ANION GAP SERPL CALCULATED.3IONS-SCNC: 12 MMOL/L (ref 7–15)
APTT PPP: 39 SECONDS (ref 22–38)
AST SERPL W P-5'-P-CCNC: 31 U/L (ref 0–45)
B-OH-BUTYR SERPL-SCNC: 0.2 MMOL/L
BASE EXCESS BLDA CALC-SCNC: -3.6 MMOL/L (ref -3–3)
BILIRUB SERPL-MCNC: 0.3 MG/DL
BUN SERPL-MCNC: 61.9 MG/DL (ref 8–23)
CALCIUM SERPL-MCNC: 8.3 MG/DL (ref 8.8–10.4)
CHLORIDE SERPL-SCNC: 111 MMOL/L (ref 98–107)
CREAT SERPL-MCNC: 1.91 MG/DL (ref 0.51–0.95)
EGFRCR SERPLBLD CKD-EPI 2021: 29 ML/MIN/1.73M2
ERYTHROCYTE [DISTWIDTH] IN BLOOD BY AUTOMATED COUNT: 14.8 % (ref 10–15)
ERYTHROCYTE [DISTWIDTH] IN BLOOD BY AUTOMATED COUNT: 14.8 % (ref 10–15)
ERYTHROCYTE [DISTWIDTH] IN BLOOD BY AUTOMATED COUNT: 14.9 % (ref 10–15)
GLUCOSE BLDC GLUCOMTR-MCNC: 108 MG/DL (ref 70–99)
GLUCOSE BLDC GLUCOMTR-MCNC: 123 MG/DL (ref 70–99)
GLUCOSE BLDC GLUCOMTR-MCNC: 128 MG/DL (ref 70–99)
GLUCOSE BLDC GLUCOMTR-MCNC: 135 MG/DL (ref 70–99)
GLUCOSE BLDC GLUCOMTR-MCNC: 153 MG/DL (ref 70–99)
GLUCOSE BLDC GLUCOMTR-MCNC: 176 MG/DL (ref 70–99)
GLUCOSE BLDC GLUCOMTR-MCNC: 184 MG/DL (ref 70–99)
GLUCOSE BLDC GLUCOMTR-MCNC: 195 MG/DL (ref 70–99)
GLUCOSE BLDC GLUCOMTR-MCNC: 262 MG/DL (ref 70–99)
GLUCOSE BLDC GLUCOMTR-MCNC: 280 MG/DL (ref 70–99)
GLUCOSE BLDC GLUCOMTR-MCNC: 292 MG/DL (ref 70–99)
GLUCOSE BLDC GLUCOMTR-MCNC: 306 MG/DL (ref 70–99)
GLUCOSE BLDC GLUCOMTR-MCNC: 325 MG/DL (ref 70–99)
GLUCOSE SERPL-MCNC: 111 MG/DL (ref 70–99)
HCO3 BLD-SCNC: 22 MMOL/L (ref 21–28)
HCO3 SERPL-SCNC: 21 MMOL/L (ref 22–29)
HCT VFR BLD AUTO: 48 % (ref 35–47)
HCT VFR BLD AUTO: 48.5 % (ref 35–47)
HCT VFR BLD AUTO: 48.7 % (ref 35–47)
HGB BLD-MCNC: 15.2 G/DL (ref 11.7–15.7)
HGB BLD-MCNC: 15.7 G/DL (ref 11.7–15.7)
HGB BLD-MCNC: 15.7 G/DL (ref 11.7–15.7)
HOLD SPECIMEN: NORMAL
HOLD SPECIMEN: NORMAL
LACTATE SERPL-SCNC: 1.5 MMOL/L (ref 0.7–2)
LACTATE SERPL-SCNC: 2.2 MMOL/L (ref 0.7–2)
LACTATE SERPL-SCNC: 2.6 MMOL/L (ref 0.7–2)
LVEF ECHO: NORMAL
MCH RBC QN AUTO: 30.8 PG (ref 26.5–33)
MCH RBC QN AUTO: 30.8 PG (ref 26.5–33)
MCH RBC QN AUTO: 31 PG (ref 26.5–33)
MCHC RBC AUTO-ENTMCNC: 31.7 G/DL (ref 31.5–36.5)
MCHC RBC AUTO-ENTMCNC: 32.2 G/DL (ref 31.5–36.5)
MCHC RBC AUTO-ENTMCNC: 32.4 G/DL (ref 31.5–36.5)
MCV RBC AUTO: 96 FL (ref 78–100)
MCV RBC AUTO: 96 FL (ref 78–100)
MCV RBC AUTO: 97 FL (ref 78–100)
MRSA DNA SPEC QL NAA+PROBE: NEGATIVE
O2/TOTAL GAS SETTING VFR VENT: 70 %
OXYHGB MFR BLDA: 97 % (ref 92–100)
PCO2 BLD: 41 MM HG (ref 35–45)
PEEP: 5 CM H2O
PH BLD: 7.34 [PH] (ref 7.35–7.45)
PLATELET # BLD AUTO: 303 10E3/UL (ref 150–450)
PLATELET # BLD AUTO: 342 10E3/UL (ref 150–450)
PLATELET # BLD AUTO: 346 10E3/UL (ref 150–450)
PO2 BLD: 85 MM HG (ref 80–105)
POTASSIUM SERPL-SCNC: 4 MMOL/L (ref 3.4–5.3)
PROT SERPL-MCNC: 5.8 G/DL (ref 6.4–8.3)
RBC # BLD AUTO: 4.93 10E6/UL (ref 3.8–5.2)
RBC # BLD AUTO: 5.06 10E6/UL (ref 3.8–5.2)
RBC # BLD AUTO: 5.09 10E6/UL (ref 3.8–5.2)
SA TARGET DNA: NEGATIVE
SAO2 % BLDA: 97.5 % (ref 96–97)
SODIUM SERPL-SCNC: 144 MMOL/L (ref 135–145)
WBC # BLD AUTO: 15.5 10E3/UL (ref 4–11)
WBC # BLD AUTO: 19.5 10E3/UL (ref 4–11)
WBC # BLD AUTO: 20.5 10E3/UL (ref 4–11)

## 2025-03-24 PROCEDURE — 71045 X-RAY EXAM CHEST 1 VIEW: CPT

## 2025-03-24 PROCEDURE — 85730 THROMBOPLASTIN TIME PARTIAL: CPT | Performed by: INTERNAL MEDICINE

## 2025-03-24 PROCEDURE — 999N000157 HC STATISTIC RCP TIME EA 10 MIN

## 2025-03-24 PROCEDURE — 85027 COMPLETE CBC AUTOMATED: CPT | Performed by: INTERNAL MEDICINE

## 2025-03-24 PROCEDURE — 70450 CT HEAD/BRAIN W/O DYE: CPT

## 2025-03-24 PROCEDURE — 93306 TTE W/DOPPLER COMPLETE: CPT | Mod: 26 | Performed by: INTERNAL MEDICINE

## 2025-03-24 PROCEDURE — 82010 KETONE BODYS QUAN: CPT | Performed by: INTERNAL MEDICINE

## 2025-03-24 PROCEDURE — 71275 CT ANGIOGRAPHY CHEST: CPT

## 2025-03-24 PROCEDURE — 94003 VENT MGMT INPAT SUBQ DAY: CPT

## 2025-03-24 PROCEDURE — 250N000011 HC RX IP 250 OP 636: Performed by: RADIOLOGY

## 2025-03-24 PROCEDURE — 94640 AIRWAY INHALATION TREATMENT: CPT

## 2025-03-24 PROCEDURE — 36592 COLLECT BLOOD FROM PICC: CPT | Performed by: INTERNAL MEDICINE

## 2025-03-24 PROCEDURE — 93010 ELECTROCARDIOGRAM REPORT: CPT | Performed by: INTERNAL MEDICINE

## 2025-03-24 PROCEDURE — 3E043XZ INTRODUCTION OF VASOPRESSOR INTO CENTRAL VEIN, PERCUTANEOUS APPROACH: ICD-10-PCS | Performed by: INTERNAL MEDICINE

## 2025-03-24 PROCEDURE — 83605 ASSAY OF LACTIC ACID: CPT | Performed by: INTERNAL MEDICINE

## 2025-03-24 PROCEDURE — 250N000009 HC RX 250: Performed by: INTERNAL MEDICINE

## 2025-03-24 PROCEDURE — 82805 BLOOD GASES W/O2 SATURATION: CPT | Performed by: INTERNAL MEDICINE

## 2025-03-24 PROCEDURE — 250N000011 HC RX IP 250 OP 636: Performed by: INTERNAL MEDICINE

## 2025-03-24 PROCEDURE — 250N000011 HC RX IP 250 OP 636: Mod: JZ | Performed by: INTERNAL MEDICINE

## 2025-03-24 PROCEDURE — 93306 TTE W/DOPPLER COMPLETE: CPT

## 2025-03-24 PROCEDURE — 36415 COLL VENOUS BLD VENIPUNCTURE: CPT | Performed by: INTERNAL MEDICINE

## 2025-03-24 PROCEDURE — 03HY32Z INSERTION OF MONITORING DEVICE INTO UPPER ARTERY, PERCUTANEOUS APPROACH: ICD-10-PCS | Performed by: INTERNAL MEDICINE

## 2025-03-24 PROCEDURE — 94640 AIRWAY INHALATION TREATMENT: CPT | Mod: 76

## 2025-03-24 PROCEDURE — 80053 COMPREHEN METABOLIC PANEL: CPT | Performed by: INTERNAL MEDICINE

## 2025-03-24 PROCEDURE — 258N000003 HC RX IP 258 OP 636: Performed by: INTERNAL MEDICINE

## 2025-03-24 PROCEDURE — 250N000013 HC RX MED GY IP 250 OP 250 PS 637: Performed by: HOSPITALIST

## 2025-03-24 PROCEDURE — 87641 MR-STAPH DNA AMP PROBE: CPT | Performed by: INTERNAL MEDICINE

## 2025-03-24 PROCEDURE — 200N000001 HC R&B ICU

## 2025-03-24 PROCEDURE — 99233 SBSQ HOSP IP/OBS HIGH 50: CPT | Performed by: INTERNAL MEDICINE

## 2025-03-24 PROCEDURE — 250N000011 HC RX IP 250 OP 636: Performed by: HOSPITALIST

## 2025-03-24 PROCEDURE — 93005 ELECTROCARDIOGRAM TRACING: CPT

## 2025-03-24 PROCEDURE — 74177 CT ABD & PELVIS W/CONTRAST: CPT

## 2025-03-24 RX ORDER — IOPAMIDOL 755 MG/ML
85 INJECTION, SOLUTION INTRAVASCULAR ONCE
Status: COMPLETED | OUTPATIENT
Start: 2025-03-24 | End: 2025-03-24

## 2025-03-24 RX ORDER — VANCOMYCIN HYDROCHLORIDE
1500 ONCE
Status: COMPLETED | OUTPATIENT
Start: 2025-03-24 | End: 2025-03-24

## 2025-03-24 RX ORDER — VANCOMYCIN HYDROCHLORIDE
1250 EVERY 24 HOURS
Status: DISCONTINUED | OUTPATIENT
Start: 2025-03-25 | End: 2025-03-26

## 2025-03-24 RX ORDER — HEPARIN SODIUM 10000 [USP'U]/100ML
0-5000 INJECTION, SOLUTION INTRAVENOUS CONTINUOUS
Status: DISCONTINUED | OUTPATIENT
Start: 2025-03-24 | End: 2025-03-24

## 2025-03-24 RX ORDER — SODIUM CHLORIDE, SODIUM LACTATE, POTASSIUM CHLORIDE, CALCIUM CHLORIDE 600; 310; 30; 20 MG/100ML; MG/100ML; MG/100ML; MG/100ML
INJECTION, SOLUTION INTRAVENOUS CONTINUOUS
Status: DISCONTINUED | OUTPATIENT
Start: 2025-03-24 | End: 2025-03-27

## 2025-03-24 RX ORDER — HEPARIN SODIUM 10000 [USP'U]/100ML
0-5000 INJECTION, SOLUTION INTRAVENOUS CONTINUOUS
Status: DISCONTINUED | OUTPATIENT
Start: 2025-03-24 | End: 2025-03-31 | Stop reason: HOSPADM

## 2025-03-24 RX ADMIN — SODIUM CHLORIDE, SODIUM LACTATE, POTASSIUM CHLORIDE, AND CALCIUM CHLORIDE: .6; .31; .03; .02 INJECTION, SOLUTION INTRAVENOUS at 11:54

## 2025-03-24 RX ADMIN — CHLORHEXIDINE GLUCONATE ORAL RINSE 15 ML: 1.2 SOLUTION DENTAL at 09:48

## 2025-03-24 RX ADMIN — NOREPINEPHRINE BITARTRATE 0.36 MCG/KG/MIN: 0.02 INJECTION, SOLUTION INTRAVENOUS at 16:25

## 2025-03-24 RX ADMIN — Medication 5 MG/HR: at 16:24

## 2025-03-24 RX ADMIN — PANTOPRAZOLE SODIUM 40 MG: 40 INJECTION, POWDER, FOR SOLUTION INTRAVENOUS at 08:23

## 2025-03-24 RX ADMIN — INSULIN ASPART 4 UNITS: 100 INJECTION, SOLUTION INTRAVENOUS; SUBCUTANEOUS at 19:43

## 2025-03-24 RX ADMIN — Medication 1500 MG: at 13:14

## 2025-03-24 RX ADMIN — INSULIN HUMAN 6 UNITS/HR: 1 INJECTION, SOLUTION INTRAVENOUS at 02:29

## 2025-03-24 RX ADMIN — IOPAMIDOL 85 ML: 755 INJECTION, SOLUTION INTRAVENOUS at 09:13

## 2025-03-24 RX ADMIN — NOREPINEPHRINE BITARTRATE 0.22 MCG/KG/MIN: 0.02 INJECTION, SOLUTION INTRAVENOUS at 21:08

## 2025-03-24 RX ADMIN — ACETAMINOPHEN 325 MG: 325 TABLET, FILM COATED ORAL at 16:55

## 2025-03-24 RX ADMIN — METHYLPREDNISOLONE SODIUM SUCCINATE 125 MG: 125 INJECTION, POWDER, FOR SOLUTION INTRAMUSCULAR; INTRAVENOUS at 13:47

## 2025-03-24 RX ADMIN — IPRATROPIUM BROMIDE AND ALBUTEROL SULFATE 3 ML: .5; 3 SOLUTION RESPIRATORY (INHALATION) at 23:24

## 2025-03-24 RX ADMIN — PIPERACILLIN AND TAZOBACTAM 3.38 G: 3; .375 INJECTION, POWDER, FOR SOLUTION INTRAVENOUS at 09:56

## 2025-03-24 RX ADMIN — CHLORHEXIDINE GLUCONATE ORAL RINSE 15 ML: 1.2 SOLUTION DENTAL at 19:44

## 2025-03-24 RX ADMIN — EMPAGLIFLOZIN 25 MG: 25 TABLET, FILM COATED ORAL at 09:48

## 2025-03-24 RX ADMIN — PRIMIDONE 50 MG: 50 TABLET ORAL at 22:07

## 2025-03-24 RX ADMIN — PIPERACILLIN AND TAZOBACTAM 3.38 G: 3; .375 INJECTION, POWDER, FOR SOLUTION INTRAVENOUS at 16:25

## 2025-03-24 RX ADMIN — METHYLPREDNISOLONE SODIUM SUCCINATE 125 MG: 125 INJECTION, POWDER, FOR SOLUTION INTRAMUSCULAR; INTRAVENOUS at 02:07

## 2025-03-24 RX ADMIN — ASPIRIN 81 MG: 81 TABLET, CHEWABLE ORAL at 09:48

## 2025-03-24 RX ADMIN — NOREPINEPHRINE BITARTRATE 0.14 MCG/KG/MIN: 0.02 INJECTION, SOLUTION INTRAVENOUS at 08:52

## 2025-03-24 RX ADMIN — SODIUM CHLORIDE, SODIUM LACTATE, POTASSIUM CHLORIDE, AND CALCIUM CHLORIDE: .6; .31; .03; .02 INJECTION, SOLUTION INTRAVENOUS at 05:39

## 2025-03-24 RX ADMIN — Medication 25 MCG: at 09:48

## 2025-03-24 RX ADMIN — METHYLPREDNISOLONE SODIUM SUCCINATE 125 MG: 125 INJECTION, POWDER, FOR SOLUTION INTRAMUSCULAR; INTRAVENOUS at 08:23

## 2025-03-24 RX ADMIN — NOREPINEPHRINE BITARTRATE 0.32 MCG/KG/MIN: 0.02 INJECTION, SOLUTION INTRAVENOUS at 12:37

## 2025-03-24 RX ADMIN — INSULIN HUMAN 3.5 UNITS/HR: 1 INJECTION, SOLUTION INTRAVENOUS at 22:37

## 2025-03-24 RX ADMIN — NOREPINEPHRINE BITARTRATE 0.28 MCG/KG/MIN: 0.02 INJECTION, SOLUTION INTRAVENOUS at 18:10

## 2025-03-24 RX ADMIN — IPRATROPIUM BROMIDE AND ALBUTEROL SULFATE 3 ML: .5; 3 SOLUTION RESPIRATORY (INHALATION) at 19:12

## 2025-03-24 RX ADMIN — INSULIN ASPART 4 UNITS: 100 INJECTION, SOLUTION INTRAVENOUS; SUBCUTANEOUS at 17:02

## 2025-03-24 RX ADMIN — INSULIN ASPART 1 UNITS: 100 INJECTION, SOLUTION INTRAVENOUS; SUBCUTANEOUS at 08:40

## 2025-03-24 RX ADMIN — ENOXAPARIN SODIUM 70 MG: 80 INJECTION SUBCUTANEOUS at 09:56

## 2025-03-24 RX ADMIN — AMIODARONE HYDROCHLORIDE 1 MG/MIN: 1.8 INJECTION, SOLUTION INTRAVENOUS at 12:36

## 2025-03-24 RX ADMIN — INSULIN ASPART 3 UNITS: 100 INJECTION, SOLUTION INTRAVENOUS; SUBCUTANEOUS at 13:07

## 2025-03-24 RX ADMIN — IPRATROPIUM BROMIDE AND ALBUTEROL SULFATE 3 ML: .5; 3 SOLUTION RESPIRATORY (INHALATION) at 03:22

## 2025-03-24 RX ADMIN — IPRATROPIUM BROMIDE AND ALBUTEROL SULFATE 3 ML: .5; 3 SOLUTION RESPIRATORY (INHALATION) at 15:06

## 2025-03-24 RX ADMIN — IPRATROPIUM BROMIDE AND ALBUTEROL SULFATE 3 ML: .5; 3 SOLUTION RESPIRATORY (INHALATION) at 07:21

## 2025-03-24 RX ADMIN — AMIODARONE HYDROCHLORIDE 0.5 MG/MIN: 1.8 INJECTION, SOLUTION INTRAVENOUS at 17:40

## 2025-03-24 RX ADMIN — AMIODARONE HYDROCHLORIDE 150 MG: 1.5 INJECTION, SOLUTION INTRAVENOUS at 11:47

## 2025-03-24 RX ADMIN — PIPERACILLIN AND TAZOBACTAM 3.38 G: 3; .375 INJECTION, POWDER, FOR SOLUTION INTRAVENOUS at 22:03

## 2025-03-24 RX ADMIN — PIPERACILLIN AND TAZOBACTAM 3.38 G: 3; .375 INJECTION, POWDER, FOR SOLUTION INTRAVENOUS at 04:22

## 2025-03-24 RX ADMIN — Medication 150 MCG/HR: at 11:54

## 2025-03-24 RX ADMIN — HEPARIN SODIUM 1700 UNITS/HR: 10000 INJECTION, SOLUTION INTRAVENOUS at 22:17

## 2025-03-24 RX ADMIN — IPRATROPIUM BROMIDE AND ALBUTEROL SULFATE 3 ML: .5; 3 SOLUTION RESPIRATORY (INHALATION) at 11:33

## 2025-03-24 RX ADMIN — NOREPINEPHRINE BITARTRATE 0.4 MCG/KG/MIN: 0.02 INJECTION, SOLUTION INTRAVENOUS at 14:12

## 2025-03-24 ASSESSMENT — ACTIVITIES OF DAILY LIVING (ADL)
ADLS_ACUITY_SCORE: 74
ADLS_ACUITY_SCORE: 74
ADLS_ACUITY_SCORE: 70
ADLS_ACUITY_SCORE: 74
ADLS_ACUITY_SCORE: 70
ADLS_ACUITY_SCORE: 74
ADLS_ACUITY_SCORE: 70
ADLS_ACUITY_SCORE: 74
ADLS_ACUITY_SCORE: 70
ADLS_ACUITY_SCORE: 74
ADLS_ACUITY_SCORE: 70
ADLS_ACUITY_SCORE: 74
ADLS_ACUITY_SCORE: 70

## 2025-03-24 NOTE — PLAN OF CARE
Plan of Care Reviewed With: Tavo and sister Mary    Overall Patient Progress: Maintaining    Pt continues to be intubated and sedated. ET tube is 22 at the gums. Vent settings as documented in flow sheet and pt is on 60% FiO2. CVC remains in place. Blue lumen has versed,  levophed, and amiodarone infusing. White lumen has LR and fentanyl infusing. Brown lumen is saline locked. All lumens have good blood return. Left radial ART line in place. Dressing has been changed this shift. Receiving Vancomycin and zosyn IV. IV in R wrist is saline locked. HR has minimally improved with amiodarone that was started this shift. Landrum catheter in place and has had adequate urine output this shift. OG is 57 at the lips and has been set to LIS except when held for oral medications. Oral cares and repositions done as documented.      Face to face report given with opportunity to observe patient.    Report given to Saumya Jones RN   3/24/2025  6:57 PM

## 2025-03-24 NOTE — ANESTHESIA PREPROCEDURE EVALUATION
"Anesthesia Pre-Procedure Evaluation    Patient: Marly Cannon   MRN: [id number] : 1965        Procedure :           History reviewed. No pertinent past medical history.   Past Surgical History:   Procedure Laterality Date     BIOPSY BREAST Left 2008    patient states no bx, Clip in left breast/ stereo bxc  no path in [facility] that far back     COLONOSCOPY N/A 2015    Procedure: COLONOSCOPY;  Surgeon: Gentry Porter MD;  Location: [facility]     COLONOSCOPY N/A 2018    Procedure: COLONOSCOPY;  COLONOSCOPY WITH POLYPECTOMY;  Surgeon: Gentry Porter MD;  Location: [facility] OR      No Known Allergies   Social History     Tobacco Use     Smoking status: Former     Current packs/day: 1.00     Average packs/day: 1 pack/day for 46.2 years (46.2 ttl pk-yrs)     Types: Cigarettes     Start date: 1979     Passive exposure: Current     Smokeless tobacco: Never     Tobacco comments:     Declined QP 2020   Substance Use Topics     Alcohol use: No     Alcohol/week: 0.0 standard drinks of alcohol      Wt Readings from Last 1 Encounters:   25 90.4 kg (199 lb 4.7 oz)              OUTSIDE LABS:  CBC:   Lab Results   Component Value Date    WBC 16.4 (H) 2025    WBC 13.7 (H) 2025    HGB 16.8 (H) 2025    HGB 17.5 (H) 2025    HCT 51.0 (H) 2025    HCT 54.0 (H) 2025     2025     2025     BMP:   Lab Results   Component Value Date     2025     2025    POTASSIUM 4.0 2025    POTASSIUM 4.1 2025    CHLORIDE 107 2025    CHLORIDE 103 2025    CO2 24 2025    CO2 23 2025    BUN 56.6 (H) 2025    BUN 57.3 (H) 2025    CR 1.91 (H) 2025    CR 1.74 (H) 2025     (H) 2025     (H) 2025     COAGS: No results found for: \"PTT\", \"INR\", \"FIBR\"  POC:   Lab Results   Component Value Date    [facility] 233 (H) 2018     HEPATIC:   Lab Results "   Component Value Date    ALBUMIN 2.6 (L) 03/22/2025    PROTTOTAL 6.6 03/22/2025    ALT 10 03/22/2025    AST 21 03/22/2025    ALKPHOS 111 03/22/2025    BILITOTAL 0.6 03/22/2025     OTHER:   Lab Results   Component Value Date    PH 7.18 (LL) 03/23/2025    LACT 1.8 03/22/2025    A1C 12.7 (H) 03/22/2025    NORM 7.9 (L) 03/23/2025    PHOS 5.9 (H) 03/23/2025    MAG 2.0 03/22/2025    TSH 1.18 03/18/2025    CRP <2.9 07/01/2019       Anesthesia Plan    ASA Status:  4, emergent    - Procedure: Procedure only, no anesthetic delivered               Techniques and Equipment:     - Lines/Monitors: Arterial Line     Consents    Anesthesia Plan(s) and associated risks, benefits, and realistic alternatives discussed. Questions answered and patient/representative(s) expressed understanding.     - Discussed:     - Discussed with:  Implied consent/emergency            Postoperative Care            Comments:             BLAIR Martinez CRNA    Clinically Significant Risk Factors Present on Admission         # Hyponatremia: Lowest Na = 134 mmol/L in last 2 days, will monitor as appropriate  # Hypochloremia: Lowest Cl = 96 mmol/L in last 2 days, will monitor as appropriate   # Hypercalcemia: corrected calcium is >10.1, will monitor as appropriate    # Hypoalbuminemia: Lowest albumin = 2.6 g/dL at 3/22/2025  5:31 PM, will monitor as appropriate   # Drug Induced Platelet Defect: home medication list includes an antiplatelet medication  # Acute Kidney Injury, unspecified: based on a >150% or 0.3 mg/dL increase in last creatinine compared to past 90 day average, will monitor renal function  # Hypertension: Noted on problem list   # Circulatory Shock: required vasopressors within past 24 hours      # Acute Hypercapnic Respiratory Failure: based on arterial blood gas results.  Continue supplemental oxygen and ventilatory support as indicated.       # DMII: A1C = 12.7 % (Ref range: <5.7 %) within past 6 months   # Obesity: Estimated  "body mass index is 33.16 kg/m  as calculated from the following:    Height as of this encounter: 1.651 m (5' 5\").    Weight as of this encounter: 90.4 kg (199 lb 4.7 oz).       # COPD: noted on problem list          "

## 2025-03-24 NOTE — PROGRESS NOTES
"Critical Care updates  I was called for frequent VT runs, up to 12 beats. No change in vent settings since 2100 hours, continues to receive NE gtt around 0.25.       I examined this patient remotely using the telemedicine camera in the [facility]. The patient is located at [facility])   Sedated and synchronous with the ventilator, receiving VE at 8.5 L/min.     /78   Pulse 115   Temp 97.3  F (36.3  C) (Tympanic)   Resp 20   Ht 1.651 m (5' 5\")   Wt 90.4 kg (199 lb 4.7 oz)   SpO2 98%   BMI 33.16 kg/m    FiO2 (%): 100 %, Resp: 20, Inspiratory Pressure (cm H2O) (Drager Elaina): 30, Vent Mode: PCV Plus assist, Resp Rate (Set): 20 breaths/min, Tidal Volume (Set, mL): 350 mL, PEEP (cm H2O): 5 cmH2O, Resp Rate (Set): 20 breaths/min, Tidal Volume (Set, mL): 350 mL, PEEP (cm H2O): 5 cmH2O  Recent Labs   Lab 03/23/25 2145 03/23/25 2019 03/23/25 1913 03/23/25  1646 03/23/25  1159   PH  --  7.24* 7.18* 7.05* 7.22*   PCO2  --  52* 67* 103* 56*   PO2  --  79* 82 106* 60*   HCO3  --  22 25 28 23   O2PER 100 100 100 70 60     Venous Blood Gas  Recent Labs   Lab 03/23/25 2145 03/23/25 2019 03/23/25 1913 03/23/25  1646 03/23/25  1159 03/22/25  1731 03/18/25  1209 03/17/25  2327   PHV 7.27*  --   --   --   --  7.39 7.35 7.42   PCO2V 44  --   --   --   --  38* 57* 45   PO2V 54*  --   --   --   --  56* 30 39   HCO3V 20*  --   --   --   --  23 31* 29*   JOSE ARMANDO -6.5*  --   --   --   --  -1.5 3.0 3.2*   O2PER 100 100 100 70   < > 28 28 24    < > = values in this interval not displayed.       Assessment & Plan:   Ventricular tachycardia runs   2.   Acute hypoxic hypercapnic respiratory failure   Plan:   - Stat 12 lead EKG, reviewed and shows normal Qtc, AF with RVR and frequent PVCs  - Continue therapeutic dose lovenox   - HR acceptable at 110, no need for rate control currently   - Add Mg and Hb to recent labs from [facility], both returned within normal range  - Reduce NE gtt for MAP 65-70 " to limit ventricular excitation   - Reduce RR from 20 to 16 to limit any alkalosis given rapid recent correction of pH  - Repeat VBG at 0400 hours   D/w RN and RT     Critical care time not including procedures was 40 minutes.   Mayte Manjarrez MD

## 2025-03-24 NOTE — PROGRESS NOTES
Patient remains on ventilator with settings at:  Mode: PCV  RR: 22  FIO2: 70  PEEP: 5  Inspiratory Pressure 30  Expiratory Vt 500-600 ml  ET tube secured at: 7.5 Size: 22  Cuff checked: Yes minimal seal  Breath sounds: coarse  SpO2: >92%  Suction: small amount of white secretions  Ambu bag present: Yes

## 2025-03-24 NOTE — ANESTHESIA POSTPROCEDURE EVALUATION
Patient: Marly Cannon    Procedure: * No procedures listed *       Anesthesia Type:  No value filed.    Note:  Disposition: ICU            ICU Sign Out: Unable to perform physician to physician sign out   Postop Pain Control: Uneventful            Sign Out: Well controlled pain   PONV: No   Neuro/Psych: Uneventful            Sign Out: Acceptable/Baseline neuro status   Airway/Respiratory: Uneventful            Sign Out: AIRWAY IN SITU/Resp. Support               Airway in situ/Resp. Support: ETT                 Reason: Unplanned   CV/Hemodynamics: Uneventful            Sign Out: Acceptable CV status; No obvious hypovolemia; No obvious fluid overload   Other NRE: NONE   DID A NON-ROUTINE EVENT OCCUR? No         Last vitals:  Vitals:    03/23/25 1925 03/23/25 1930 03/23/25 1935   BP: (!) 88/54 (!) 93/32 108/73   Pulse: 86 93 76   Resp: 19 25 20   Temp:      SpO2: 95% 96% 97%       Electronically Signed By: BLAIR Martinez CRNA  March 23, 2025  8:04 PM

## 2025-03-24 NOTE — PHARMACY-MEDICATION REGIMEN REVIEW
"Pharmacy Antimicrobial Stewardship Assessment     Current Antimicrobial Therapy:  Anti-infectives (From now, onward)      Start     Dose/Rate Route Frequency Ordered Stop    03/23/25 1000  piperacillin-tazobactam (ZOSYN) 3.375 g vial to attach to  mL bag        Note to Pharmacy: For [facility], [facility] and [facility]: For Zosyn-naive patients, use the \"Zosyn initial dose + extended infusion\" order panel.    3.375 g  over 30 Minutes Intravenous EVERY 6 HOURS 03/23/25 0907              Indication: aspiration pneumonia       Culture Results:   30-Day Micro Results       Collected Updated Procedure Result Status      03/22/2025 2104 03/24/2025 0632 Blood Culture Peripheral Blood [[id number]]   Peripheral Blood    Preliminary result Component Value   Culture No growth after 1 day  [P]                03/22/2025 2104 03/24/2025 0632 Blood Culture Peripheral Blood [[id number]]   Peripheral Blood    Preliminary result Component Value   Culture No growth after 1 day  [P]                03/22/2025 1715 03/22/2025 2014 Influenza A/B, RSV and SARS-CoV2 PCR (COVID-19) Nasopharyngeal [[id number]]    Swab from Nasopharyngeal    Final result Component Value   Influenza A PCR Negative   Influenza B PCR Negative   RSV PCR Negative   SARS CoV2 PCR Negative   NEGATIVE: SARS-CoV-2 (COVID-19) RNA not detected, presumed negative.            03/17/2025 2046 03/23/2025 0616 Blood Culture Arm, Left [[id number]]   Blood from Arm, Left    Final result Component Value   Culture No Growth               03/17/2025 2038 03/17/2025 2119 Influenza A/B, RSV and SARS-CoV2 PCR (COVID-19) Nasopharyngeal [[id number]]    Swab from Nasopharyngeal    Final result Component Value   Influenza A PCR Negative   Influenza B PCR Negative   RSV PCR Negative   SARS CoV2 PCR Negative   NEGATIVE: SARS-CoV-2 (COVID-19) RNA not detected, presumed negative.                           Recommendations/Interventions:  1. none    Renea Wallace, [facility]  March 24, 2025    "

## 2025-03-24 NOTE — PLAN OF CARE
Left and right wrist soft restraints initiated on patient on 3/23/2025 at 5:52 PM    Clinical Justification: Pulling lines, pulling tubes, and pulling equipment  Less Restrictive Alternative: Repositioning, Re-evaluate equipment, Disguise equipment, Pain management, De-escalation, Reorientation  Attending Provider Notified: Yes, Attending Provider's Name: Dr. Saez   Order received: Yes     Family Notification: Spouse/significant other   Criteria explained: unable (pt sedated)  Patient's Response: No evidence of learning  Restraint care Plan initiated: Yes    Malka Brewer RN

## 2025-03-24 NOTE — ANESTHESIA CARE TRANSFER NOTE
Patient: Marly Cannon    Procedure: * No procedures listed *       Diagnosis: * No pre-op diagnosis entered *  Diagnosis Additional Information: No value filed.    Anesthesia Type:   No value filed.     Note:    Oropharynx: spontaneously breathing, endotracheal tube in place and ventilatory support  Level of Consciousness: drowsy      Independent Airway: airway patency not satisfactory and stable  Dentition: dentition unchanged  Vital Signs Stable: post-procedure vital signs reviewed and stable  Report to RN Given: handoff report given  Patient transferred to: ICU    ICU Handoff: Call for PAUSE to initiate/utilize ICU HANDOFF, Identified Patient, Identified Responsible Provider, Reviewed the Pertinent Medical History, Discussed Surgical Course, Reviewed Intra-OP Anesthesia Management and Issues during Anesthesia, Set Expectations for Post Procedure Period and Allowed Opportunity for Questions and Acknowledgement of Understanding  Vitals:  Vitals Value Taken Time   /56 03/23/25 2000   Temp     Pulse 87 03/23/25 2003   Resp 20 03/23/25 2003   SpO2 95 % 03/23/25 2003   Vitals shown include unfiled device data.    Electronically Signed By: BLAIR Martinez CRNA  March 23, 2025  8:04 PM

## 2025-03-24 NOTE — PHARMACY-VANCOMYCIN DOSING SERVICE
"Pharmacy Vancomycin Initial Note  Date of Service 2025  Patient's  1965  59 year old, female    Indication: Sepsis    Current estimated CrCl = Estimated Creatinine Clearance: 35 mL/min (A) (based on SCr of 1.91 mg/dL (H)).    Creatinine for last 3 days  3/22/2025:  5:31 PM Creatinine 1.32 mg/dL; 10:41 PM Creatinine 1.38 mg/dL  3/23/2025:  2:07 AM Creatinine 1.37 mg/dL;  6:07 AM Creatinine 1.64 mg/dL; 10:01 AM Creatinine 1.70 mg/dL;  2:04 PM Creatinine 1.74 mg/dL;  6:26 PM Creatinine 1.91 mg/dL;  9:45 PM Creatinine 1.85 mg/dL  3/24/2025:  4:26 AM Creatinine 1.91 mg/dL    Recent Vancomycin Level(s) for last 3 days  No results found for requested labs within last 3 days.      Vancomycin IV Administrations (past 72 hours)        No vancomycin orders with administrations in past 72 hours.                    Nephrotoxins and other renal medications (From now, onward)      Start     Dose/Rate Route Frequency Ordered Stop    25 1900  vasopressin (VASOSTRICT) 20 Units in sodium chloride 0.9 % 100 mL standard conc infusion         2.4 Units/hr  12 mL/hr  Intravenous CONTINUOUS 25 1858      25 1730  norepinephrine (LEVOPHED) 4 mg in  mL infusion PREMIX         0.01-0.6 mcg/kg/min × 90.4 kg  3.4-203.4 mL/hr  Intravenous CONTINUOUS 25 1728      25 1000  piperacillin-tazobactam (ZOSYN) 3.375 g vial to attach to  mL bag        Note to Pharmacy: For [facility], [facility] and [facility]: For Zosyn-naive patients, use the \"Zosyn initial dose + extended infusion\" order panel.    3.375 g  over 30 Minutes Intravenous EVERY 6 HOURS 25 0907      25 0900  empagliflozin (JARDIANCE) tablet 25 mg        Note to Pharmacy: PTA Sig:Take 1 tablet (25 mg) by mouth daily      25 mg Oral DAILY 25 0632              Contrast Orders - past 72 hours (72h ago, onward)      Start     Dose/Rate Route Frequency Stop    25 0900  iopamidol (ISOVUE-370) solution 85 mL         85 mL Intravenous ONCE " 03/24/25 0913 03/22/25 2045  iopamidol (ISOVUE-370) solution 66 mL         66 mL Intravenous ONCE 03/22/25 2110            [employer] Prediction of Planned Initial Vancomycin Regimen  Loading dose: 1500 mg at 13:00 03/24/2025.  Regimen: 1250 mg IV every 24 hours.  Start time: 13:00 on 03/25/2025  Exposure target: AUC24 (range)400-600 mg/L.hr   AUC24,ss: 574 mg/L.hr  Probability of AUC24 > 400: 86 %  Ctrough,ss: 18.8 mg/L  Probability of Ctrough,ss > 20: 44 %  Probability of nephrotoxicity (Lodise GEORGE 2009): 15 %          Plan:  Start vancomycin  1500mg IV load, followed by 1250 mg IV q24h.   Vancomycin monitoring method: AUC  Vancomycin therapeutic monitoring goal: 400-600 mg*h/L  Pharmacy will check vancomycin levels as appropriate in 1-3 Days.    Serum creatinine levels will be ordered daily for the first week of therapy and at least twice weekly for subsequent weeks.      Renea Wallace [facility]

## 2025-03-24 NOTE — PLAN OF CARE
Plan of Care Reviewed With: Patient; sister-Mary    Overall Patient Progress: Not progressing    VS and assessments as charted. On initial assessment, pt responsive, but very sleepy. O2 at 4 LPM via NC. SR 80s. NPO. Monitoring BG q 4 hours-no insulin gtt. Incontinent of urine. Turned/repositioned.    1105: Insulin gtt restarted per MD order.    1215: At approximately 1115, pt with increased respiratory effort, tachypnea and decreased O2 sats (low 80s) with O2 at 4 LPM via NC. Placed on oxymask and non-rebreather with little improvement. MD contacted during this time and at bedside to assess pt. Pt placed on bipap (10/5; FiO2 80%) at this time (refer to RT note).     1245: Pt remains on bipap and NPO. Continuous Cardizem gtt at 5 mg/hr started. SR 80s.    1615: Pt unresponsive. Diaphoretic. Agonal type breathing noted. Abdominal and accessory muscle use increased. Remains on bipap. RT called to bedside. Hospitalist updated regarding pt condition. Provider at bedside to assess a few minutes later (refer to provider note). CXR and ABGs obtained. Decision made by provider to intubate pt. Anesthesia called.     1723: NS 1L bolus started; pt hypotensive.  1730: Pt intubated and placed on vent (refer to CRNA and RT notes).  1733: Levophed gtt started at 0.03 mcg/kg/min.  1747: Fentanyl gtt started at 25 mcg/hr.  1751: Fentanyl 50 mcg bolus given from pump, as per MD order at bedside.  1753: Second Fentanyl 50 mcg bolus given from pump, as per MD order at bedside.  1820: NS 1L bolus started (second bolus).  1823: Versed gtt started at 3 mg/hr, as per MD order at bedside.  1855:  mg/dl. MD updated. Per MD, stop insulin gtt.    Central line also placed by CRNA. At shift change, Levophed infusing at 0.23 mcg/kg/min; Versed infusing at 3 mg/hr; Fentanyl infusing at 100 mcg/hr; NS bolus (second liter) infusing. Medical healing restraints in place.     Face to face report given with opportunity to observe  patient.  Report given to KHADIJAH Teague.    Malka Brewer RN  3/23/2025, 9:46 PM

## 2025-03-24 NOTE — PROGRESS NOTES
[facility]    Hospitalist Progress Note    Date of Service (when I saw the patient): 03/24/2025    Assessment & Plan   Marly Cannon is a 59 year old female who was admitted on 3/22/2025.     Acute hypoxic/hypercapnic respiratory failure: Patient intubated afternoon of 3/23.  Etiology uncertain, however possibility includes PE, aspiration, pneumonia, versus bad COPD.  CTA chest on admission showed clear lungs, small subsegmental PE.  Placed on therapeutic Lovenox at that time.  Patient is subsequently required BiPAP, then intubation over the course of 8 to 10 hours.  Serial chest x-rays have been stable/clear.  Worst ABG showed pH of 7.05 with pCO2 103, suggesting retention.COPD exacerbation?  -3/24: Intubated, sedated on 3/23.  Ventilator settings with FiO2 of 70%, PEEP of 5.  Latest ABG pH 7.34, pCO2 41, pO2 of 85..  Repeat CTA chest shows progression of right lower lobe pulmonary embolism, now involving the segmental as well as a subsegmental arteries.  There is collapse of the entire right lower lobe with opacification of bronchi within the right lower lobe, possible mucous plugging from aspiration of contents?  Continuing Zosyn    Suspected pneumonia: initial CTA chest shows clear lungs, however patient tachypneic, increased work of breathing.  Oxygen requirement likely due to COPD.  Repeat CTA findings as above.  -Continuing Zosyn  -Continue to monitor respiratory status on ventilator    Shock: Uncertain etiology.  Will need to rule out obstructive shock with CTA.  Patient is now on moderate amount of Levophed.  She has been adequately volume resuscitated per sepsis protocol.  -3/24: Maintenance fluids.  Art line inserted, will titrate pressors to MAP of 60.  Echocardiogram ordered.  WBC 15.5, Tmax 99.7 in last 24 hours.  Urinalysis negative for significant pyuria.  Initial CTA on admission showed clear lungs.  CTA without massive saddle embolus, CT abdomen pelvis with dilated gallbladder, but no  biliary dilatation, no obvious obstruction.  No thickened gallbladder wall, no fluid collection.  CT also shows subcutaneous edema in the lower anterior abdominal wall and into the right labia.  Possible cellulitic infection?  Will continue IV Zosyn.  Adding vancomycin    Encephalopathy: Toxic versus metabolic.  Patient is intubated and sedated.  -3/24: CT of the head negative for acute pathology.    Pulmonary embolism: Subsegmental with no right heart strain at least on CT.  Patient given therapeutic Lovenox in the emergency room.  -Echocardiogram ordered  -3/24: Currently on Lovenox twice daily.  Given worsening renal function, will switch her to heparin    CVS:  Patient is initial EKG with A-fib RVR with heart rate in the 150s.  Patient received diltiazem, now she is converted.  She was on p.o. diltiazem 60 mg every 8 hours scheduled, as well as a diltiazem drip.  This was stopped due to hypotension.  Patient has also had self-limited runs of SVT.  -3/24: Shock requiring pressors.  Patient currently normal sinus rhythm but rate is tachy at 120.  Echocardiogram is ordered.  Given hypotension, cannot use calcium channel blocker or beta-blocker.  EKG shows SVT versus aflutter.  Will start her on amiodarone drip.  Continuing anticoagulation with heparin gtt    Uncontrolled diabetes mellitus type 2, insulin-dependent: This is despite insulin pump.  Patient had functioning insulin pump on 3/20/2025 when she was discharged from this facility.  Anion gap is 17, but blood glucoses have been in the 200s.  Serum ketones resolved for a time, but now have returned.  Ketones uptrending  -3/23: Insulin pump removed.  Was given long-acting insulin 20 units of Toujeo this a.m., ketones continuing to uptrend.  Will place her back on insulin drip at this time.  -3/24: Patient on insulin drip.  Shutting off periodically for euglycemia.    YADIRA: Creatinine rising since admission, now 1.64.  Previously slightly above 1, on presentation  "was 1.3.  Patient did receive contrast.  -3/24: Creatinine 1.91.  Maintaining IV fluids.  Monitoring renal function and urine output    COPD: Oxygen dependence established last admission.  Patient was discharged on 2 L nasal cannula.  -Supplemental oxygen as needed, wean as able    FEN: Diabetic diet as tolerated.    Clinically Significant Risk Factors         # Hyponatremia: Lowest Na = 134 mmol/L in last 2 days, will monitor as appropriate  # Hypochloremia: Lowest Cl = 96 mmol/L in last 2 days, will monitor as appropriate  # Hyperchloremia: Highest Cl = 111 mmol/L in last 2 days, will monitor as appropriate       # Hypercalcemia: corrected calcium is >10.1, will monitor as appropriate    # Hypoalbuminemia: Lowest albumin = 1.9 g/dL at 3/24/2025  4:26 AM, will monitor as appropriate  # Coagulation Defect: INR = 1.29 (Ref range: 0.85 - 1.15) and/or PTT = N/A, will monitor for bleeding     # Acute Kidney Injury, unspecified: based on a >150% or 0.3 mg/dL increase in last creatinine compared to past 90 day average, will monitor renal function  # Hypertension: Noted on problem list     # Acute Hypercapnic Respiratory Failure: based on arterial blood gas results.  Continue supplemental oxygen and ventilatory support as indicated.        # DMII: A1C = 12.7 % (Ref range: <5.7 %) within past 6 months   # Obesity: Estimated body mass index is 34.41 kg/m  as calculated from the following:    Height as of this encounter: 1.651 m (5' 5\").    Weight as of this encounter: 93.8 kg (206 lb 12.7 oz)., PRESENT ON ADMISSION     # COPD: noted on problem list        DVT Prophylaxis: Enoxaparin (Lovenox) SQ    Code Status: Full Code    Disposition: Pending clinical improvement.    Bibi Cancino MD, MD        Interval History   Patient seen in room.  Patient is intubated, sedated.    -Data reviewed today: I reviewed all new labs and imaging results over the last 24 hours. I personally reviewed imaging reports.    Physical Exam   Temp: " 98.9  F (37.2  C) Temp src: Tympanic BP: (!) 80/61 Pulse: (!) 122   Resp: 22 SpO2: 96 % O2 Device: Mechanical Ventilator Oxygen Delivery: 4 LPM  Vitals:    03/22/25 2317 03/23/25 0500 03/24/25 0428   Weight: 90.4 kg (199 lb 4.7 oz) 90.4 kg (199 lb 4.7 oz) 93.8 kg (206 lb 12.7 oz)     Vital Signs with Ranges  Temp:  [97.3  F (36.3  C)-99.7  F (37.6  C)] 98.9  F (37.2  C)  Pulse:  [] 122  Resp:  [14-40] 22  BP: ()/(22-99) 80/61  MAP:  [55 mmHg-94 mmHg] 63 mmHg  Arterial Line BP: (-)/(-48-83) 82/55  FiO2 (%):  [60 %-100 %] 70 %  SpO2:  [80 %-100 %] 96 %      Intake/Output Summary (Last 24 hours) at 3/24/2025 0816  Last data filed at 3/24/2025 0600  Gross per 24 hour   Intake 3910.1 ml   Output 680 ml   Net 3230.1 ml         Peripheral IV 03/22/25 Anterior;Distal;Left Lower forearm (Active)   Site Assessment [facility] 03/24/25 0400   Line Status Saline locked 03/24/25 0400   Dressing Transparent 03/24/25 0400   Dressing Status clean;dry;intact 03/23/25 2030   Line Intervention Flushed 03/24/25 0400   Phlebitis Scale 0-->no symptoms 03/24/25 0400   Infiltration? no 03/24/25 0400   Number of days: 2       Peripheral IV 03/23/25 Left;Posterior Wrist (Active)   Site Assessment WDL 03/24/25 0400   Line Status Saline locked 03/24/25 0400   Dressing Transparent 03/24/25 0400   Dressing Status new drainage 03/23/25 2030   Dressing Intervention New dressing  03/23/25 2030   Line Intervention Flushed 03/24/25 0400   Phlebitis Scale 0-->no symptoms 03/24/25 0400   Infiltration? no 03/24/25 0400   Number of days: 1       Peripheral IV 03/23/25 Right Wrist (Active)   Site Assessment WDL 03/24/25 0400   Line Status Saline locked 03/24/25 0400   Dressing Transparent 03/24/25 0400   Dressing Status clean;dry;intact 03/23/25 2030   Line Intervention Flushed 03/24/25 0400   Phlebitis Scale 0-->no symptoms 03/24/25 0400   Infiltration? no 03/24/25 0400   Number of days: 1       Arterial Line 03/23/25 Radial (Active)   Site  Assessment WDL 03/24/25 0400   Line Status Pulsatile blood flow 03/24/25 0400   Art Line Waveform Dampened 03/24/25 0400   Art Line Interventions Zeroed and calibrated;Leveled;Connections checked and tightened;Flushed per protocol;Line pulled back 03/24/25 0245   Color/Movement/Sensation Capillary refill less than 3 sec;Cool fingers/toes 03/24/25 0400   Line Necessity Yes, meets criteria 03/24/25 0400   Dressing Type Transparent 03/24/25 0400   Dressing Status Clean, dry, intact 03/24/25 0400   Dressing Intervention Dressing changed/new dressing 03/23/25 2030   Number of days: 1       CVC Triple Lumen Right Internal jugular (Active)   Site Assessment WDL 03/24/25 0400   Dressing Chlorhexidine disk;Transparent 03/24/25 0400   Dressing Status clean;dry;intact 03/24/25 0400   Line Necessity Yes, meets criteria 03/24/25 0400   Blue - Status infusing 03/24/25 0400   Brown - Status infusing 03/24/25 0540   Brown - Intervention Cap change;Flushed;Lab drawn 03/24/25 0400   White - Status infusing 03/24/25 0400   Phlebitis Scale 0-->no symptoms 03/24/25 0400   Infiltration? no 03/24/25 0400   Number of days: 1       ETT Cuffed 7.5 mm (Active)   Secured at (cm) 22 cm 03/24/25 0721   Measured from Teeth/Gums 03/24/25 0721   Position Left 03/24/25 0721   Secured by Commercial tube haque 03/24/25 0721   Bite Block Included with Commercial tube haque 03/24/25 0721   Site Appearance Clean;Dry 03/24/25 0721   Cuff Assessment Cuff Pressure 03/24/25 0721   Cuff Pressure - cm H2O 28 cmH20 03/24/25 0721   Safety Measures Manual resuscitator at bedside 03/24/25 0721   Number of days: 1       GI Enteral Access Device Mouth, center Gastric 18 fr (Active)   Status Clamped 03/24/25 0600   Placement Confirmation [place] unchanged 03/24/25 0600   Surrounding Skin Assessment WDL 03/24/25 0600   Insertion Site Assessment WDL 03/24/25 0600   [place] (cm marking) at nare/mouth 57 cm 03/24/25 0600   [place] (visual) [place] at entry site  (nare, mouth, etc.) 03/24/25 0600   Securement Device Tape 03/24/25 0422   Drainage None 03/24/25 0422   Flush/Free Water (mL) 60 mL 03/23/25 2300   Number of days: 1       Urinary Drain 03/23/25 Urethral Catheter (Active)   Tube Description Positional 03/24/25 0422   Catheter Care Catheter wipes 03/24/25 0422   Perineal Skin Assessment Excoriated;Erythema 03/24/25 0422   Collection Container Standard 03/24/25 0422   Securement Method Commercial securement device 03/24/25 0422   Rationale for Continued Use Non-ICU: q2 hour intake/output with documentation 03/24/25 0422   Urine Output 65 mL 03/24/25 0600   Number of days: 1     No line/device    Constitutional - intubated, sedated, obese  HEENT - atraumatic, normocephalic  Neck - supple, no masses, no JVD  CVS - S1 S2 RRR, no murmurs, rubs, gallops  Respiratory - good air movement, slight coarseness bilaterally, no wheezes  GI - soft, appears tender right upper quadrant, ND, decreased bowel sounds, no organomegaly  Musculoskeletal - no LE edema, no lesions  Neuro - intubated, sedated, no gross focal deficits      Medications   Current Facility-Administered Medications   Medication Dose Route Frequency Provider Last Rate Last Admin    dextrose 10% infusion   Intravenous Continuous PRN Bibi Cancino MD        [Held by provider] diltiazem (CARDIZEM) 125 mg in dextrose 5 % 125 mL infusion  5 mg/hr Intravenous Continuous Bibi Cancino MD   Stopped at 03/23/25 1732    fentaNYL (SUBLIMAZE) infusion   mcg/hr Intravenous Continuous Gentry Saez DO 2 mL/hr at 03/24/25 0743 100 mcg/hr at 03/24/25 0743    insulin regular (MYXREDLIN) 1 unit/mL infusion  0-24 Units/hr Intravenous Continuous Bibi Cancino MD   Held at 03/24/25 0545    lactated ringers infusion   Intravenous Continuous Mayte Manjarrez MD 75 mL/hr at 03/24/25 0744 Rate Verify at 03/24/25 0744    midazolam (VERSED) 100 mg/100 mL NS infusion - ADULT  1-8 mg/hr Intravenous Continuous Se  Gentry KABA DO 4 mL/hr at 03/24/25 0745 4 mg/hr at 03/24/25 0745    No lozenges or gum should be given while patient on BIPAP/AVAPS/AVAPS AE   Does not apply Continuous PRN Bibi Cancino MD        norepinephrine (LEVOPHED) 4 mg in  mL infusion PREMIX  0.01-0.6 mcg/kg/min Intravenous Continuous Gentry Saez DO 33.9 mL/hr at 03/24/25 0743 0.1 mcg/kg/min at 03/24/25 0743    Patient is already receiving anticoagulation with heparin, enoxaparin (LOVENOX), warfarin (COUMADIN)  or other anticoagulant medication   Does not apply Continuous PRN Te Parish MD        vasopressin (VASOSTRICT) 20 Units in sodium chloride 0.9 % 100 mL standard conc infusion  2.4 Units/hr Intravenous Continuous Gentry Saez DO         Current Facility-Administered Medications   Medication Dose Route Frequency Provider Last Rate Last Admin    aspirin (ASA) chewable tablet 81 mg  81 mg Oral Daily Te Parish MD        atorvastatin (LIPITOR) tablet 40 mg  40 mg Oral At Bedtime Te Parish MD   40 mg at 03/23/25 2301    chlorhexidine (PERIDEX) 0.12 % solution 15 mL  15 mL Mouth/Throat Q12H Gentry Saez DO   15 mL at 03/23/25 2052    diltiazem (CARDIZEM) tablet 60 mg  60 mg Oral Q8H [facility] Te Parish MD   60 mg at 03/23/25 0514    empagliflozin (JARDIANCE) tablet 25 mg  25 mg Oral Daily Te Parish MD        enoxaparin ANTICOAGULANT (LOVENOX) injection 70 mg  0.75 mg/kg Subcutaneous Q12H Te Parish MD   70 mg at 03/23/25 2255    insulin aspart (NovoLOG) injection (RAPID ACTING)  1-7 Units Subcutaneous Q4H Te Parish MD   1 Units at 03/23/25 2058    [Held by provider] insulin glargine U-300 (TOUJEO) injection 20 Units  20 Units Subcutaneous QAM Марина, Te, MD   20 Units at 03/23/25 0911    ipratropium - albuterol 0.5 mg/2.5 mg/3 mL (DUONEB) neb solution 3 mL  3 mL Nebulization Q4H Mayte Manjarrez MD   3 mL at 03/24/25 0721     methylPREDNISolone Na Suc (solu-MEDROL) injection 125 mg  125 mg Intravenous Q6H Mayte Manjarrez MD   125 mg at 03/24/25 0207    pantoprazole (PROTONIX) 2 mg/mL suspension 40 mg  40 mg Per Feeding Tube QAM AC Gentry Saez,         Or    pantoprazole (PROTONIX) IV push injection 40 mg  40 mg Intravenous QAM  Gentry Saez, DO        piperacillin-tazobactam (ZOSYN) 3.375 g vial to attach to  mL bag  3.375 g Intravenous Q6H Bibi Cancino MD   3.375 g at 03/24/25 0422    primidone (MYSOLINE) tablet 50 mg  50 mg Oral At Bedtime Te Parish MD   50 mg at 03/23/25 2301    Vitamin D3 (CHOLECALCIFEROL) tablet 25 mcg  25 mcg Oral Daily Te Parish MD           Data   Recent Labs   Lab 03/24/25  0611 03/24/25  0503 03/24/25  0426 03/23/25  2218 03/23/25  2145 03/23/25  1856 03/23/25  1826 03/22/25  1905 03/22/25  1731 03/20/25  0709 03/20/25  0604   WBC  --   --  15.5*  --   --   --   --   --  16.4*  --  13.7*   HGB  --   --  15.2  --  16.1*  --   --   --  16.8*  --  17.5*   MCV  --   --  97  --   --   --   --   --  94  --  96   PLT  --   --  303  --   --   --   --   --  260  --  232   INR  --   --   --   --   --   --  1.29*  --   --   --   --    NA  --   --  144  --  142  --  143   < > 134*  --  141   POTASSIUM  --   --  4.0  --  5.0  --  4.0   < > 4.5  --  4.3   CHLORIDE  --   --  111*  --  106  --  107   < > 96*  --  104   CO2  --   --  21*  --  19*  --  24   < > 21*  --  24   BUN  --   --  61.9*  --  59.0*  --  56.6*   < > 48.1*  --  24.6*   CR  --   --  1.91*  --  1.85*  --  1.91*   < > 1.32*  --  1.09*   ANIONGAP  --   --  12  --  17*  --  12   < > 17*  --  13   NORM  --   --  8.3*  --  8.2*  --  7.9*   < > 9.0  --  9.4   * 128* 111*   < > 219*   < > 157*   < > 460*   < > 173*   ALBUMIN  --   --  1.9*  --   --   --   --   --  2.6*  --   --    PROTTOTAL  --   --  5.8*  --   --   --   --   --  6.6  --   --    BILITOTAL  --   --  0.3  --   --   --   --   --  0.6  --    --    ALKPHOS  --   --  120  --   --   --   --   --  111  --   --    ALT  --   --  16  --   --   --   --   --  10  --   --    AST  --   --  31  --   --   --   --   --  21  --   --     < > = values in this interval not displayed.     Lactic Acid, Initial   Date Value Ref Range Status   03/22/2025 1.8 0.7 - 2.0 mmol/L Final       Recent Results (from the past 24 hours)   XR Chest Port 1 View    Narrative    EXAM: XR CHEST PORT 1 VIEW  LOCATION: [facility]  DATE: 3/23/2025    INDICATION: increased hypoxia  COMPARISON: 3/22/2025.      Impression    IMPRESSION: No significant interval change from 3/22/2025.    XR Chest 1 View    Narrative    EXAM: XR CHEST 1 VIEW  LOCATION: [facility]  DATE: 3/23/2025    INDICATION: new intubation  COMPARISON: Same day chest radiograph      Impression    IMPRESSION: Normal size of cardiomediastinal silhouette. Endotracheal tube with tip 5.6 cm above the carlos. Right internal jugular approach central venous catheter with tip overlying the superior vena cava. Likely right lower lobe atelectasis. No   definite airspace consolidation, pleural effusion or pneumothorax. Bones are unchanged.   XR Chest Port 1 View    Narrative    EXAM: XR CHEST PORT 1 VIEW  LOCATION: [facility]  DATE: 3/23/2025    INDICATION: hypoxia  COMPARISON: 3/23/2025 at 1809 hours      Impression    IMPRESSION: Endotracheal tube terminates 5.5 cm above the carlos. Right-sided central venous catheter terminates over the mid SVC. Heart size is normal. Right lower lobe collapse redemonstrated. Upper lobes are clear. No pneumothorax.   US Lower Extremity Venous Duplex Bilateral    Narrative    EXAM: US LOWER EXTREMITY VENOUS DUPLEX BILATERAL  LOCATION: [facility]  DATE: 3/23/2025    INDICATION: Assess for DVTs as to explain likelyhood of additional PEs   Unstable and cannot got doing for RE study at this time.  COMPARISON: None.  TECHNIQUE: Venous Duplex ultrasound of  bilateral lower extremities with and without compression, augmentation and duplex. Color flow and spectral Doppler with waveform analysis performed. Difficult exam with poor visualization of calf veins.    FINDINGS: Exam includes the common femoral, femoral, popliteal veins as well as segmentally visualized deep calf veins and greater saphenous vein.     RIGHT: No deep vein thrombosis. No superficial thrombophlebitis. No popliteal cyst.    LEFT: No deep vein thrombosis. No superficial thrombophlebitis. No popliteal cyst. Focal subcutaneous edema in the upper calf. No well-defined collection.      Impression    IMPRESSION:  1.  No deep venous thrombosis in the visualized portions of the bilateral lower extremities.   XR Abdomen Port 1 View    Narrative    EXAM: XR ABDOMEN PORT 1 VIEW  LOCATION: [facility]  DATE: 3/23/2025    INDICATION: OG placement  COMPARISON: None.      Impression    IMPRESSION: NG tube in stomach. Nonobstructive bowel gas pattern in the included upper abdomen.       Bibi Cancino MD

## 2025-03-24 NOTE — MEDICATION SCRIBE - ADMISSION MEDICATION HISTORY
Medication Scribe Admission Medication History    Admission medication history is complete. The information provided in this note is only as accurate as the sources available at the time of the update.    Information Source(s):  Kristina at [facility]  via phone    Pertinent Information:   Spoke with patient's homecare nurse, Kristina [phone], who reports no medication changes since last admission aside from addition of acyclovir which she reports patient did start taking and last known dose was on Friday 3/21/25.     Changes made to PTA medication list:  Added: None  Deleted: None  Changed: spiriva to PRN per last med review notes    Allergies reviewed with patient and updates made in EHR: no    Medication History Completed By: Hayde Flores 3/24/2025 9:47 AM    PTA Med List   Medication Sig Last Dose/Taking    acetaminophen (TYLENOL) 325 MG tablet Take 325 mg by mouth every 6 hours as needed for mild pain. Taking As Needed    acyclovir (ZOVIRAX) 400 MG tablet Take 1 tablet (400 mg) by mouth 3 times daily for 7 days. 3/21/2025    albuterol (PROAIR HFA/PROVENTIL HFA/VENTOLIN HFA) 108 (90 Base) MCG/ACT inhaler Inhale 2 puffs into the lungs daily as needed for shortness of breath. Taking As Needed    amLODIPine (NORVASC) 5 MG tablet Take 1 tablet (5 mg) by mouth daily Morning    aspirin (ASPIRIN LOW DOSE) 81 MG chewable tablet CHEW AND SWALLOW 1 TABLET BY MOUTH DAILY Taking    atorvastatin (LIPITOR) 40 MG tablet TAKE 1 TABLET BY MOUTH AT BEDTIME Taking    blood glucose (ONETOUCH ULTRA) test strip USE TO TEST BLOOD SUGAR 4 TIMES DAILY (Patient taking differently: No sig reported) Taking Differently    Cholecalciferol (VITAMIN D3) 50 MCG (2000 UT) CAPS TAKE 1 CAPSULE BY MOUTH DAILY Morning    Continuous Glucose Sensor (FREESTYLE HANK 2 PLUS SENSOR) MISC 1 each every 14 days. Taking    empagliflozin (JARDIANCE) 25 MG TABS tablet Take 1 tablet (25 mg) by mouth daily Morning    insulin aspart (NOVOLOG VIAL) 100 UNITS/ML  vial TO BE USED WITH INSULIN PUMP, MAX DAILY UNITS 70 Taking    Insulin Disposable Pump (OMNIPOD 5 LIBRE2 PLUS G6 PODS) MISC 1 each every 72 hours. Taking    Insulin Disposable Pump (OMNIPOD 5 LIBRE2 PLUS G6) KIT 1 each every 72 hours. Taking    Insulin Disposable Pump (OMNIPOD DASH PODS, GEN 4,) MISC Inject 1 pod subcutaneously every 48 hours. Taking    insulin glargine U-300 (TOUJEO SOLOSTAR) 300 UNIT/ML (1 units dial) pen Inject 20 Units Subcutaneous At Bedtime (Patient taking differently: Inject 20 Units subcutaneously nightly as needed (pump failure).) Taking Differently    INSULIN PUMP - OUTPATIENT Insulin pump  Omnipod Dash  Date: 1/23/25  Basal: 1.3 (new)  Total basal: 31.2   CR:15  ISF: 50  BG target: 120  BG correction: 130  Active insulin: 4 hours Taking    ipratropium-albuterol (COMBIVENT RESPIMAT)  MCG/ACT inhaler INHALE 1 PUFF INTO THE LUNGS 4 TIMES DAILY (Patient taking differently: Inhale 1 puff into the lungs 4 times daily as needed.) Taking Differently    ketoconazole (NIZORAL) 2 % external cream Apply topically daily (Patient taking differently: Apply topically daily as needed.) Taking Differently    lisinopril (ZESTRIL) 40 MG tablet TAKE 1 TABLET BY MOUTH DAILY Morning    primidone (MYSOLINE) 50 MG tablet TAKE 1 TABLET BY MOUTH AT BEDTIME Bedtime    tiotropium-olodaterol 2.5-2.5 MCG/ACT AERS Inhale 2 puffs into the lungs daily (Patient taking differently: Inhale 2 puffs into the lungs daily as needed.) Taking Differently    triamcinolone (KENALOG) 0.1 % external ointment Apply topically 2 times daily as needed (foot irritation). Taking As Needed

## 2025-03-24 NOTE — PROGRESS NOTES
TELE ICU Progress Note          Assessment and Plan:     Marly Cannon is a 59 year old patient being cared for in the ICU for acute hypoxic hypercapnic respiratory failure and DKA. Pertinent assessment and recommendations include:  Neurology: Metabolic encephalopathy, sedated with versed gtt for mechanical ventilation    Cardiovascular / Hemodynamics: Multifactorial and ongoing shock.  Vasopressor needs have increased in the last few hours.  There is a discrepancy between arterial line blood pressure and blood pressure measured by the cuff.  Labs and exam and urine output suggest blood pressure cuff is more accurate.  Ongoing tachycardia that is slightly better on amiodarone  -Have changed titration goal for norepinephrine tos ystolic goal 95 or greater as measured by blood pressure cuff.  Will monitor for signs of hypoperfusion reported lactic acid for later this afternoon and this evening.   Pulmonary: Acute hypoxic hypercapnic respiratory failure in setting of COPD.  This is improved.  Patient synchronous with ventilator.  -Have not ordered any change to ventilator settings for today.  If hemodynamically stable tomorrow could trial spontaneous mode   GI and Nutrition: Tube feeds as you are   Renal, Fluid and Electrolytes: Keto and lactic acidosis.  Both of which are improved.  Acute renal failure.  Creatinine up slightly but adequate urine output suggesting good perfusion  -Monitor urine output closely as we titrate down vasopressors   Endocrinology: Ketoacidosis improving with IVF and insulin gtt    Primary Team Communication: Communicated with RN at bedside and Dr. Cancino   [place]: Total critical care time spent by me, excluding procedures, was 35 minutes.      Olga Gaytan MD  3/23/2025           Hospital Course and Key events       The patient remains critically ill with Diabetic ketoacidosis, Hypoxic respiratory failure, Hypercarbic respiratory failure, Shock, and Acute Kidney Injury. Since the  last evaluation by my colleague earlier today              Medications:     I have reviewed this patient's current medications  Current Facility-Administered Medications   Medication Dose Route Frequency Provider Last Rate Last Admin    amiodarone (NEXTERONE) 1.8 mg/mL in dextrose 5% 200 mL ADULT STANDARD infusion  1 mg/min Intravenous Continuous Bibi Cancino MD 33.3 mL/hr at 03/24/25 1236 1 mg/min at 03/24/25 1236    amiodarone (NEXTERONE) 1.8 mg/mL in dextrose 5% 200 mL ADULT STANDARD infusion  0.5 mg/min Intravenous Continuous Bibi Cancino MD        dextrose 10% infusion   Intravenous Continuous PRN Bibi Cancino MD        [Held by provider] diltiazem (CARDIZEM) 125 mg in dextrose 5 % 125 mL infusion  5 mg/hr Intravenous Continuous Bibi Cancino MD   Stopped at 03/23/25 1732    fentaNYL (SUBLIMAZE) infusion   mcg/hr Intravenous Continuous Gentry Saez DO 3 mL/hr at 03/24/25 1154 150 mcg/hr at 03/24/25 1154    heparin 25,000 units in 0.45% NaCl 250 mL ANTICOAGULANT infusion  0-5,000 Units/hr Intravenous Continuous Bibi Cancino MD        insulin regular (MYXREDLIN) 1 unit/mL infusion  0-24 Units/hr Intravenous Continuous Bibi Cancino MD   Held at 03/24/25 0545    lactated ringers infusion   Intravenous Continuous Mayte Manjarrez MD 75 mL/hr at 03/24/25 1154 New Bag at 03/24/25 1154    midazolam (VERSED) 100 mg/100 mL NS infusion - ADULT  1-8 mg/hr Intravenous Continuous Gentry Saez DO 5 mL/hr at 03/24/25 1123 5 mg/hr at 03/24/25 1123    No lozenges or gum should be given while patient on BIPAP/AVAPS/AVAPS AE   Does not apply Continuous PRN Bibi Cancino MD        norepinephrine (LEVOPHED) 4 mg in  mL infusion PREMIX  0.01-0.6 mcg/kg/min Intravenous Continuous Gentry Saez .6 mL/hr at 03/24/25 1412 0.4 mcg/kg/min at 03/24/25 1412    Patient is already receiving anticoagulation with heparin, enoxaparin (LOVENOX), warfarin (COUMADIN)  or other  anticoagulant medication   Does not apply Continuous PRN Te Parish MD        vasopressin (VASOSTRICT) 20 Units in sodium chloride 0.9 % 100 mL standard conc infusion  2.4 Units/hr Intravenous Continuous Gentry Saez DO              Vitals and Exam:     Temp:  [97.3  F (36.3  C)-99.7  F (37.6  C)] 98  F (36.7  C)  Pulse:  [] 138  Resp:  [14-40] 22  BP: ()/(22-99) 104/64  MAP:  [55 mmHg-94 mmHg] 73 mmHg  Arterial Line BP: (-)/(-48-83) 86/64  FiO2 (%):  [60 %-100 %] 60 %  SpO2:  [80 %-100 %] 94 %    Intake/Output Summary (Last 24 hours) at 3/24/2025 1405  Last data filed at 3/24/2025 1347  Gross per 24 hour   Intake 4000.1 ml   Output 985 ml   Net 3015.1 ml     Net IO Since Admission: 3,468.7 mL [03/24/25 1405]      FiO2 (%): 70 %, Resp: 22, Inspiratory Pressure (cm H2O) (Drager Elaina): 30, Vent Mode: PCV Plus assist, Resp Rate (Set): 22 breaths/min, Tidal Volume (Set, mL): 350 mL, PEEP (cm H2O): 5 cmH2O, Resp Rate (Set): 22 breaths/min, Tidal Volume (Set, mL): 350 mL, PEEP (cm H2O): 5 cmH2O    Physical Examination:   I examined this patient remotely using the telemedicine camera in the [facility]. The patient is located at [facility] ([place])    General Appearance:   Sedated and intubated    HEENT:   Endotrachael tube is present     Respiratory:   Good patient-ventilator synchrony     Neuro:   Sedation: heavily sedated and unawakable            Pertinent Data:     All pertinent labs and diagnostic studies were reviewed    Labs:  CMP  Recent Labs   Lab 03/24/25  0611 03/24/25  0503 03/24/25  0426 03/24/25  0401 03/23/25  2218 03/23/25  2145 03/23/25  1856 03/23/25  1826 03/23/25  1500 03/23/25  1404 03/23/25  1104 03/23/25  1001 03/22/25  1905 03/22/25  1731 03/18/25  1352 03/18/25  1209 03/17/25  2327 03/17/25 2048   NA  --   --  144  --   --  142  --  143  --  141  --  139   < > 134*   < > 136   < > 132*   POTASSIUM  --   --  4.0  --   --  5.0   --  4.0  --  4.1  --  4.7   < > 4.5   < > 5.0   < > 5.2   CHLORIDE  --   --  111*  --   --  106  --  107  --  103  --  102   < > 96*   < > 95*   < > 87*   CO2  --   --  21*  --   --  19*  --  24  --  23  --  20*   < > 21*   < > 27   < > 27   ANIONGAP  --   --  12  --   --  17*  --  12  --  15  --  17*   < > 17*   < > 14   < > 18*   * 128* 111* 123*   < > 219*   < > 157*   < > 248*   < > 284*   < > 460*   < > 497*   < > 562*   BUN  --   --  61.9*  --   --  59.0*  --  56.6*  --  57.3*  --  56.3*   < > 48.1*   < > 21.5   < > 21.9   CR  --   --  1.91*  --   --  1.85*  --  1.91*  --  1.74*  --  1.70*   < > 1.32*   < > 1.37*   < > 1.41*   GFRESTIMATED  --   --  29*  --   --  30*  --  29*  --  33*  --  34*   < > 46*   < > 44*   < > 42*   NORM  --   --  8.3*  --   --  8.2*  --  7.9*  --  8.9  --  8.9   < > 9.0   < > 9.3   < > 10.9*   MAG  --   --   --   --   --  2.2  --   --   --   --   --   --   --  2.0  --  1.9  --   --    PHOS  --   --   --   --   --  5.3*  --  5.9*  --  5.7*  --  5.9*   < >  --   --   --   --   --    PROTTOTAL  --   --  5.8*  --   --   --   --   --   --   --   --   --   --  6.6  --  7.7  --  9.0*   ALBUMIN  --   --  1.9*  --   --   --   --   --   --   --   --   --   --  2.6*  --  4.0  --  4.4   BILITOTAL  --   --  0.3  --   --   --   --   --   --   --   --   --   --  0.6  --  1.0  --  0.8   ALKPHOS  --   --  120  --   --   --   --   --   --   --   --   --   --  111  --  122  --  147   AST  --   --  31  --   --   --   --   --   --   --   --   --   --  21  --  13  --  17   ALT  --   --  16  --   --   --   --   --   --   --   --   --   --  10  --  10  --  13    < > = values in this interval not displayed.     CBC  Recent Labs   Lab 03/24/25  0426 03/23/25  2145 03/22/25  1731 03/20/25  0604 03/19/25  0518   WBC 15.5*  --  16.4* 13.7* 14.9*   RBC 4.93  --  5.44* 5.64* 5.38*   HGB 15.2 16.1* 16.8* 17.5* 16.6*   HCT 48.0*  --  51.0* 54.0* 50.9*   MCV 97  --  94 96 95   MCH 30.8  --  30.9 31.0 30.9    MCHC 31.7  --  32.9 32.4 32.6   RDW 14.8  --  14.4 14.2 14.3     --  260 232 192     INR  Recent Labs   Lab 03/23/25  1826   INR 1.29*     Arterial Blood Gas  Recent Labs   Lab 03/24/25  0445 03/23/25  2347 03/23/25  2145 03/23/25 2019 03/23/25  1913   PH 7.34* 7.25*  --  7.24* 7.18*   PCO2 41 42  --  52* 67*   PO2 85 137*  --  79* 82   HCO3 22 18*  --  22 25   O2PER 70 90 100 100 100     Lactic Acid   Date Value Ref Range Status   03/23/2025 1.3 0.7 - 2.0 mmol/L Final       Imaging:  XR Chest Port 1 View  Narrative: PROCEDURE: XR CHEST PORT 1 VIEW 3/24/2025 7:38 AM    HISTORY: Follow up RML atelectasis    COMPARISONS: 3/23/2025.    TECHNIQUE: Single portable view.    FINDINGS: There is a right central venous catheter. There is a  nasogastric tube with its tip distal to the EG junction. Endotracheal  tube remains in place.    Heart size is stable. Left lung and pleural space are relatively  clear.    There is persistent abnormal density at the right lung base consistent  with right lower lobe collapse. There is no some blunting of the right  costophrenic angle and there is probably a small right-sided effusion  as well.    Calcifications are seen between the acromion process and humeral head,  stable finding.       Impression: IMPRESSION: Persistent right lower lobe collapse with probable small  effusion.    CHYNA SCHREIBER MD         SYSTEM ID:  [id number]

## 2025-03-24 NOTE — PROGRESS NOTES
Patient transport to CT. RT, two RNs, along with UA. Pt ventilated via BVM. Pt sats >96%. Pt ventilated with ease. Pt tolerated well.

## 2025-03-24 NOTE — PLAN OF CARE
Goal Outcome Evaluation:       Plan of Care Reviewed With: family    Overall Patient Progress: progressing    Patient remains intubated and sedated. Vent settings: FiO2 70%, RR 22, PEEP 5. Ordered RASS goal maintained with Versed at 4mg/hr and Fentanyl at 100mcg/hr. ETT 22cm @ gums, OG tube 57cm @ lip. Levophed titrated to maintain MAP goal >65. Afebrile. CPOT 0. LS diminished to bases. Orally suctioned for scant to small amounts of thick, white secretions. HR irregular. NSR 70s to Afib RVR. Patient did have episodes of Vtach overnight, see provider notification note. OG tube clamped. BS hypoactive. Abd rounded, soft. Landrum catheter with adequate output of yellow urine. ART line to L radial, pulsatile blood flow present, radial pulse +1, cap refill <3, fingers cool. ART line becoming more dampened throughout the shift, see provider notification note. Nose remains cyanotic. CVC dressing CDI, all 3 lumens infusing. LR 75 mL/hr. PIV SL x3. Oral cares completed q2H. Turned and repositioned q2H. IV Zosyn continues.     Face to face report given with opportunity to observe patient.    Report given to KHADIJAH Lock RN   3/24/2025  7:07 AM

## 2025-03-24 NOTE — PLAN OF CARE
Right wrist and Left wrist restraints continued 3/24/2025    Clinical Justification: Pulling lines, pulling tubes, and pulling equipment  Less Restrictive Alternative: Repositioning, Re-evaluate equipment, Disguise equipment, Pain management, Alarm  Attending Provider Notified: Yes, Attending Provider's Name: Dr. Cancino   New orders placed Yes  Length of Order: 1 Day      Hayde Jones RN

## 2025-03-24 NOTE — PROGRESS NOTES
Pt intubated with 7.5 ETT 22cm at the gums. Initial vent settings were Vt 400, RR 24, FiO2 60% and 5 of PEEP. Called back to room about 45 minutes later and pt was alarming low tidal volume. Vent setting changes were made by tele ICU provider. See flowsheet for changes. ABG was drawn. Report was given to nightshift RT.

## 2025-03-24 NOTE — PLAN OF CARE
Per Dr. Manjarrez with Tele-ICU: restart insulin gtt at same algorithm when BG is greater than 180    2100: Per tele-ICU provider place OG tube, can remain clamped for meds only. Restart insulin gtt.    2235: Ok to use OG tube for meds.    2244: Tele-ICU notified of patient having frequent runs of Vtach, longest run of 12 beats. Provider to place orders.    2330: Tele-ICU notified of patient's nose becoming increasingly cyanotic, radial pulses are also nonpalpable, doppler needed to be used.    0240: Tele-ICU notified about inaccurate and unreliable ART line blood pressures even with flushed, drawing back, zeroing and recalibrating. Per Dr. Manjarrez, go by cuff pressures for the rest of the night.        0530: Tele-ICU provider notified of morning lab results. Hold insulin gtt, start LR 75 mL/hr. Also hold PO cardizem.

## 2025-03-24 NOTE — PROGRESS NOTES
TELE ICU Progress Note          Assessment and Plan:     Marly Cannon is a 59 year old patient being cared for in the ICU for acute hypoxic hypercapnic respiratory failure and DKA. Pertinent assessment and recommendations include:  Neurology: Metabolic encephalopathy, sedated with versed gtt for mechanical ventilation    Cardiovascular / Hemodynamics: Shock of unclear etiology, severe acidosis likely contributing, will check lactic acid with next set of labs at 2045 hours   - Titrate NE gtt for MAP > 65  - Arterial line placement    Pulmonary: Acute hypoxic hypercapnic respiratory failure  Severe mixed acidosis  RML atelectasis   AutoPEEP  COPD exacerbation, FEV1 37%  - Change vent settings to Pressure Control 35 and reduce PEEP to 5 to improve VT, which was not achievable with VC. Keep RR at 20 and repeat VBG in 1 hour.  - Solumedrol 125 mg IV stat, and q6 hours  - Duoneb q4 hours stat    GI and Nutrition: NPO for now    Renal, Fluid and Electrolytes: DKA, improving with IV fluid resuscitation   YADIRA, avoid nephrotoxins, renal dose meds    Infectious Disease: No active issues currently    Hematology and Oncology: Secondary polycythemia, suspect due to chronic hypoxic respiratory failure   Endocrinology: DKA, improving with IVF and insulin gtt    Intensive Care: Central line: No central line present  Arterial line: No arterial line present  Restraint: BL wrist   Landrum catheter: Will place one tonight   DVT prophylaxis: Receiving full dose lovenox   GI prophylaxis: PPI    Primary Team Communication: Communicated with RN and RT at the bedside    Billing: Total critical care time spent by me, excluding procedures, was 45 minutes.      Mayte Manjarrez MD  3/23/2025           Hospital Course and Key events       The patient remains critically ill with Diabetic ketoacidosis, Hypoxic respiratory failure, Hypercarbic respiratory failure, Shock, and Acute Kidney Injury. In the last 24 hours, patient underwent emergent  intubation, and escalating doses of NE gtt for severe hypotension refractory to two liters of IVF boluses given over the last few hours.             Medications:     I have reviewed this patient's current medications  Current Facility-Administered Medications   Medication Dose Route Frequency Provider Last Rate Last Admin    dextrose 10% infusion   Intravenous Continuous PRN Bibi Cancino MD        [Held by provider] diltiazem (CARDIZEM) 125 mg in dextrose 5 % 125 mL infusion  5 mg/hr Intravenous Continuous Bibi Cancino MD   Stopped at 25 1732    fentaNYL (SUBLIMAZE) infusion   mcg/hr Intravenous Continuous Gentry Saez, DO 2 mL/hr at 25 1822 100 mcg/hr at 25 182    insulin regular (MYXREDLIN) 1 unit/mL infusion  0-24 Units/hr Intravenous Continuous Bibi Cancino MD   Stopped at 25 1855    midazolam (VERSED) 100 mg/100 mL NS infusion - ADULT  1-8 mg/hr Intravenous Continuous Gentry Saez DO 3 mL/hr at 25 1823 3 mg/hr at 25 1823    No lozenges or gum should be given while patient on BIPAP/AVAPS/AVAPS AE   Does not apply Continuous PRN Bibi Cancino MD        norepinephrine (LEVOPHED) 4 mg in  mL infusion PREMIX  0.01-0.6 mcg/kg/min Intravenous Continuous Gentry Saez DO 88.1 mL/hr at 25 1903 0.26 mcg/kg/min at 25 1903    Patient is already receiving anticoagulation with heparin, enoxaparin (LOVENOX), warfarin (COUMADIN)  or other anticoagulant medication   Does not apply Continuous PRN Te Parish MD        vasopressin (VASOSTRICT) 20 Units in sodium chloride 0.9 % 100 mL standard conc infusion  2.4 Units/hr Intravenous Continuous Gentry Saez DO              Vitals and Exam:       Vital Sign Ranges  Temperature Temp  Av.4  F (36.9  C)  Min: 97.3  F (36.3  C)  Max: 100  F (37.8  C)   Blood pressure Systolic (24hrs), Av , Min:77 , Max:127        Diastolic (24hrs), Av, Min:37,  Max:97      Pulse Pulse  Av.7  Min: 75  Max: 158   Respirations Resp  Av.1  Min: 12  Max: 38   Pulse oximetry SpO2  Av.1 %  Min: 83 %  Max: 98 %       I/O    Intake/Output Summary (Last 24 hours) at 3/23/2025 1924  Last data filed at 3/23/2025 0432  Gross per 24 hour   Intake 453.6 ml   Output --   Net 453.6 ml       FiO2 (%): 60 %, Resp: 16, Vent Mode: CMV/AC, Resp Rate (Set): 20 breaths/min, Tidal Volume (Set, mL): 350 mL, PEEP (cm H2O): 10 cmH2O, Resp Rate (Set): 20 breaths/min, Tidal Volume (Set, mL): 350 mL, PEEP (cm H2O): 10 cmH2O    Physical Examination:   I examined this patient remotely using the telemedicine camera in the [facility]. The patient is located at [facility] ([place])    General Appearance:   Sedated and intubated    HEENT:   Endotrachael tube is present     Respiratory:   No respiratory distress     Neuro:   Sedation: heavily sedated and unawakable   Other   No rash on limited skin exam           Pertinent Data:     All pertinent labs and diagnostic studies were reviewed    Labs:  CMP  Recent Labs   Lab 25  1856 25  1826 25  1722 25  1652 25  1500 25  1404 25  1104 25  1001 25  0817 25  0607 25  1905 25  1731 25  1352 25  1209 25  2327 25  2048   NA  --  143  --   --   --  141  --  139  --  141   < > 134*   < > 136   < > 132*   POTASSIUM  --  4.0  --   --   --  4.1  --  4.7  --  4.6   < > 4.5   < > 5.0   < > 5.2   CHLORIDE  --  107  --   --   --  103  --  102  --  103   < > 96*   < > 95*   < > 87*   CO2  --  24  --   --   --  23  --  20*  --  21*   < > 21*   < > 27   < > 27   ANIONGAP  --  12  --   --   --  15  --  17*  --  17*   < > 17*   < > 14   < > 18*   * 157* 158* 173*   < > 248*   < > 284*   < > 218*   < > 460*   < > 497*   < > 562*   BUN  --  56.6*  --   --   --  57.3*  --  56.3*  --  52.7*   < > 48.1*   < > 21.5   < > 21.9   CR  --  1.91*   --   --   --  1.74*  --  1.70*  --  1.64*   < > 1.32*   < > 1.37*   < > 1.41*   GFRESTIMATED  --  29*  --   --   --  33*  --  34*  --  35*   < > 46*   < > 44*   < > 42*   NORM  --  7.9*  --   --   --  8.9  --  8.9  --  9.3   < > 9.0   < > 9.3   < > 10.9*   MAG  --   --   --   --   --   --   --   --   --   --   --  2.0  --  1.9  --   --    PHOS  --  5.9*  --   --   --  5.7*  --  5.9*  --  5.2*   < >  --   --   --   --   --    PROTTOTAL  --   --   --   --   --   --   --   --   --   --   --  6.6  --  7.7  --  9.0*   ALBUMIN  --   --   --   --   --   --   --   --   --   --   --  2.6*  --  4.0  --  4.4   BILITOTAL  --   --   --   --   --   --   --   --   --   --   --  0.6  --  1.0  --  0.8   ALKPHOS  --   --   --   --   --   --   --   --   --   --   --  111  --  122  --  147   AST  --   --   --   --   --   --   --   --   --   --   --  21  --  13  --  17   ALT  --   --   --   --   --   --   --   --   --   --   --  10  --  10  --  13    < > = values in this interval not displayed.     CBC  Recent Labs   Lab 03/22/25  1731 03/20/25  0604 03/19/25  0518 03/18/25  1209   WBC 16.4* 13.7* 14.9* 15.9*   RBC 5.44* 5.64* 5.38* 6.02*   HGB 16.8* 17.5* 16.6* 18.7*   HCT 51.0* 54.0* 50.9* 55.5*   MCV 94 96 95 92   MCH 30.9 31.0 30.9 31.1   MCHC 32.9 32.4 32.6 33.7   RDW 14.4 14.2 14.3 14.1    232 192 236     INRNo lab results found in last 7 days.  Arterial Blood Gas  Recent Labs   Lab 03/23/25  1913 03/23/25  1646 03/23/25  1159 03/22/25  1731   PH 7.18* 7.05* 7.22*  --    PCO2 67* 103* 56*  --    PO2 82 106* 60*  --    HCO3 25 28 23  --    O2PER 100 70 60 28     Lactic Acid, Initial   Date Value Ref Range Status   03/22/2025 1.8 0.7 - 2.0 mmol/L Final       Imaging:  XR Chest 1 View  Narrative: EXAM: XR CHEST 1 VIEW  LOCATION: [facility]  DATE: 3/23/2025    INDICATION: new intubation  COMPARISON: Same day chest radiograph  Impression: IMPRESSION: Normal size of cardiomediastinal silhouette. Endotracheal tube with  tip 5.6 cm above the carlos. Right internal jugular approach central venous catheter with tip overlying the superior vena cava. Likely right lower lobe atelectasis. No   definite airspace consolidation, pleural effusion or pneumothorax. Bones are unchanged.  XR Chest Port 1 View  Narrative: EXAM: XR CHEST PORT 1 VIEW  LOCATION: [facility]  DATE: 3/23/2025    INDICATION: increased hypoxia  COMPARISON: 3/22/2025.  Impression: IMPRESSION: No significant interval change from 3/22/2025.

## 2025-03-24 NOTE — ANESTHESIA PROCEDURE NOTES
Arterial Line Procedure Note    Pre-Procedure   Staff -        CRNA: Jean Claude Parikh APRN CRNA       Performed By: CRNA       Location: ICU       Pre-Anesthestic Checklist: patient identified, IV checked, risks and benefits discussed, informed consent, monitors and equipment checked, pre-op evaluation and at physician/surgeon's request  Timeout:       Correct Patient: Yes        Correct Procedure: Yes        Correct Site: Yes        Correct Position: Yes   Line Placement:   This line was placed Post Induction starting at 3/23/2025 7:30 PM and ending at 3/23/2025 7:50 PM  Procedure   Procedure: arterial line       Diagnosis: respiratory failure/possible sepsis/embolism       Laterality: left       Insertion Site: radial.  Sterile Prep        Standard elements of sterile barrier followed       Skin prep: Chloraprep  Insertion/Injection        Technique: ultrasound guided and Seldinger Technique        1. Ultrasound was used to evaluate the access site.       2. Artery evaluated via ultrasound for patency/adequacy.       3. Using real-time ultrasound the needle/catheter was observed entering the artery/vein.       Catheter Type/Size: 20 G, 1.75 in/4.5 cm quick cath (integral wire)  Narrative         Secured by: suture and anchor securement device       Tegaderm and Biopatch dressing used.       Complications: None apparent,        Arterial waveform: Yes        IBP within 10% of NIBP: Yes

## 2025-03-25 ENCOUNTER — APPOINTMENT (OUTPATIENT)
Dept: GENERAL RADIOLOGY | Facility: HOSPITAL | Age: 60
DRG: 870 | End: 2025-03-25
Attending: INTERNAL MEDICINE
Payer: COMMERCIAL

## 2025-03-25 ENCOUNTER — TELEPHONE (OUTPATIENT)
Dept: FAMILY MEDICINE | Facility: OTHER | Age: 60
End: 2025-03-25

## 2025-03-25 DIAGNOSIS — M62.81 GENERALIZED MUSCLE WEAKNESS: Primary | ICD-10-CM

## 2025-03-25 LAB
ALBUMIN SERPL BCG-MCNC: 2 G/DL (ref 3.5–5.2)
ALLEN'S TEST: ABNORMAL
ALP SERPL-CCNC: 99 U/L (ref 40–150)
ALT SERPL W P-5'-P-CCNC: 20 U/L (ref 0–50)
ANION GAP SERPL CALCULATED.3IONS-SCNC: 15 MMOL/L (ref 7–15)
AST SERPL W P-5'-P-CCNC: 50 U/L (ref 0–45)
ATRIAL RATE - MUSE: NORMAL BPM
ATRIAL RATE - MUSE: NORMAL BPM
BASE EXCESS BLDA CALC-SCNC: -5.6 MMOL/L (ref -3–3)
BILIRUB SERPL-MCNC: 0.2 MG/DL
BUN SERPL-MCNC: 61.9 MG/DL (ref 8–23)
CALCIUM SERPL-MCNC: 8.2 MG/DL (ref 8.8–10.4)
CHLORIDE SERPL-SCNC: 114 MMOL/L (ref 98–107)
CREAT SERPL-MCNC: 2.06 MG/DL (ref 0.51–0.95)
DIASTOLIC BLOOD PRESSURE - MUSE: NORMAL MMHG
DIASTOLIC BLOOD PRESSURE - MUSE: NORMAL MMHG
EGFRCR SERPLBLD CKD-EPI 2021: 27 ML/MIN/1.73M2
ERYTHROCYTE [DISTWIDTH] IN BLOOD BY AUTOMATED COUNT: 14.9 % (ref 10–15)
GLUCOSE BLDC GLUCOMTR-MCNC: 114 MG/DL (ref 70–99)
GLUCOSE BLDC GLUCOMTR-MCNC: 120 MG/DL (ref 70–99)
GLUCOSE BLDC GLUCOMTR-MCNC: 121 MG/DL (ref 70–99)
GLUCOSE BLDC GLUCOMTR-MCNC: 125 MG/DL (ref 70–99)
GLUCOSE BLDC GLUCOMTR-MCNC: 126 MG/DL (ref 70–99)
GLUCOSE BLDC GLUCOMTR-MCNC: 127 MG/DL (ref 70–99)
GLUCOSE BLDC GLUCOMTR-MCNC: 129 MG/DL (ref 70–99)
GLUCOSE BLDC GLUCOMTR-MCNC: 133 MG/DL (ref 70–99)
GLUCOSE BLDC GLUCOMTR-MCNC: 135 MG/DL (ref 70–99)
GLUCOSE BLDC GLUCOMTR-MCNC: 135 MG/DL (ref 70–99)
GLUCOSE BLDC GLUCOMTR-MCNC: 139 MG/DL (ref 70–99)
GLUCOSE BLDC GLUCOMTR-MCNC: 140 MG/DL (ref 70–99)
GLUCOSE BLDC GLUCOMTR-MCNC: 140 MG/DL (ref 70–99)
GLUCOSE BLDC GLUCOMTR-MCNC: 147 MG/DL (ref 70–99)
GLUCOSE BLDC GLUCOMTR-MCNC: 150 MG/DL (ref 70–99)
GLUCOSE BLDC GLUCOMTR-MCNC: 157 MG/DL (ref 70–99)
GLUCOSE BLDC GLUCOMTR-MCNC: 160 MG/DL (ref 70–99)
GLUCOSE BLDC GLUCOMTR-MCNC: 161 MG/DL (ref 70–99)
GLUCOSE BLDC GLUCOMTR-MCNC: 188 MG/DL (ref 70–99)
GLUCOSE BLDC GLUCOMTR-MCNC: 227 MG/DL (ref 70–99)
GLUCOSE BLDC GLUCOMTR-MCNC: 254 MG/DL (ref 70–99)
GLUCOSE BLDC GLUCOMTR-MCNC: 289 MG/DL (ref 70–99)
GLUCOSE SERPL-MCNC: 162 MG/DL (ref 70–99)
HCO3 BLD-SCNC: 20 MMOL/L (ref 21–28)
HCO3 SERPL-SCNC: 17 MMOL/L (ref 22–29)
HCT VFR BLD AUTO: 45.8 % (ref 35–47)
HGB BLD-MCNC: 14.9 G/DL (ref 11.7–15.7)
HOLD SPECIMEN: NORMAL
INTERPRETATION ECG - MUSE: NORMAL
INTERPRETATION ECG - MUSE: NORMAL
MAGNESIUM SERPL-MCNC: 2.2 MG/DL (ref 1.7–2.3)
MCH RBC QN AUTO: 30.6 PG (ref 26.5–33)
MCHC RBC AUTO-ENTMCNC: 32.5 G/DL (ref 31.5–36.5)
MCV RBC AUTO: 94 FL (ref 78–100)
O2/TOTAL GAS SETTING VFR VENT: 60 %
OXYHGB MFR BLDA: 97 % (ref 92–100)
P AXIS - MUSE: NORMAL DEGREES
P AXIS - MUSE: NORMAL DEGREES
PCO2 BLD: 38 MM HG (ref 35–45)
PEEP: 5 CM H2O
PH BLD: 7.33 [PH] (ref 7.35–7.45)
PHOSPHATE SERPL-MCNC: 3 MG/DL (ref 2.5–4.5)
PLATELET # BLD AUTO: 302 10E3/UL (ref 150–450)
PO2 BLD: 89 MM HG (ref 80–105)
POTASSIUM SERPL-SCNC: 3.2 MMOL/L (ref 3.4–5.3)
POTASSIUM SERPL-SCNC: 3.4 MMOL/L (ref 3.4–5.3)
POTASSIUM SERPL-SCNC: 3.6 MMOL/L (ref 3.4–5.3)
PR INTERVAL - MUSE: NORMAL MS
PR INTERVAL - MUSE: NORMAL MS
PROT SERPL-MCNC: 5.8 G/DL (ref 6.4–8.3)
QRS DURATION - MUSE: 90 MS
QRS DURATION - MUSE: 92 MS
QT - MUSE: 272 MS
QT - MUSE: 344 MS
QTC - MUSE: 413 MS
QTC - MUSE: 471 MS
R AXIS - MUSE: 22 DEGREES
R AXIS - MUSE: 41 DEGREES
RBC # BLD AUTO: 4.87 10E6/UL (ref 3.8–5.2)
SAO2 % BLDA: 97.2 % (ref 96–97)
SODIUM SERPL-SCNC: 146 MMOL/L (ref 135–145)
SYSTOLIC BLOOD PRESSURE - MUSE: NORMAL MMHG
SYSTOLIC BLOOD PRESSURE - MUSE: NORMAL MMHG
T AXIS - MUSE: -48 DEGREES
T AXIS - MUSE: 26 DEGREES
UFH PPP CHRO-ACNC: >1.1 IU/ML
VENTRICULAR RATE- MUSE: 113 BPM
VENTRICULAR RATE- MUSE: 139 BPM
WBC # BLD AUTO: 19.5 10E3/UL (ref 4–11)

## 2025-03-25 PROCEDURE — 250N000009 HC RX 250: Performed by: INTERNAL MEDICINE

## 2025-03-25 PROCEDURE — 71045 X-RAY EXAM CHEST 1 VIEW: CPT

## 2025-03-25 PROCEDURE — 258N000003 HC RX IP 258 OP 636: Performed by: INTERNAL MEDICINE

## 2025-03-25 PROCEDURE — 84100 ASSAY OF PHOSPHORUS: CPT | Performed by: INTERNAL MEDICINE

## 2025-03-25 PROCEDURE — 250N000013 HC RX MED GY IP 250 OP 250 PS 637: Performed by: HOSPITALIST

## 2025-03-25 PROCEDURE — 82805 BLOOD GASES W/O2 SATURATION: CPT | Performed by: INTERNAL MEDICINE

## 2025-03-25 PROCEDURE — 85027 COMPLETE CBC AUTOMATED: CPT | Performed by: INTERNAL MEDICINE

## 2025-03-25 PROCEDURE — 99233 SBSQ HOSP IP/OBS HIGH 50: CPT | Performed by: INTERNAL MEDICINE

## 2025-03-25 PROCEDURE — 250N000011 HC RX IP 250 OP 636: Performed by: INTERNAL MEDICINE

## 2025-03-25 PROCEDURE — 82310 ASSAY OF CALCIUM: CPT | Performed by: INTERNAL MEDICINE

## 2025-03-25 PROCEDURE — 83735 ASSAY OF MAGNESIUM: CPT | Performed by: INTERNAL MEDICINE

## 2025-03-25 PROCEDURE — 94003 VENT MGMT INPAT SUBQ DAY: CPT

## 2025-03-25 PROCEDURE — 999N000157 HC STATISTIC RCP TIME EA 10 MIN

## 2025-03-25 PROCEDURE — 94640 AIRWAY INHALATION TREATMENT: CPT | Mod: 76

## 2025-03-25 PROCEDURE — 250N000013 HC RX MED GY IP 250 OP 250 PS 637: Performed by: INTERNAL MEDICINE

## 2025-03-25 PROCEDURE — 84132 ASSAY OF SERUM POTASSIUM: CPT | Performed by: INTERNAL MEDICINE

## 2025-03-25 PROCEDURE — 94640 AIRWAY INHALATION TREATMENT: CPT

## 2025-03-25 PROCEDURE — 94668 MNPJ CHEST WALL SBSQ: CPT

## 2025-03-25 PROCEDURE — 94667 MNPJ CHEST WALL 1ST: CPT

## 2025-03-25 PROCEDURE — 85520 HEPARIN ASSAY: CPT | Performed by: INTERNAL MEDICINE

## 2025-03-25 PROCEDURE — 200N000001 HC R&B ICU

## 2025-03-25 PROCEDURE — 36592 COLLECT BLOOD FROM PICC: CPT | Performed by: INTERNAL MEDICINE

## 2025-03-25 RX ORDER — ACETAMINOPHEN 325 MG/1
325 TABLET ORAL EVERY 6 HOURS PRN
Status: DISCONTINUED | OUTPATIENT
Start: 2025-03-25 | End: 2025-03-31 | Stop reason: HOSPADM

## 2025-03-25 RX ORDER — IPRATROPIUM BROMIDE AND ALBUTEROL SULFATE 2.5; .5 MG/3ML; MG/3ML
3 SOLUTION RESPIRATORY (INHALATION)
Status: DISCONTINUED | OUTPATIENT
Start: 2025-03-25 | End: 2025-03-31 | Stop reason: HOSPADM

## 2025-03-25 RX ORDER — VITAMIN B COMPLEX
25 TABLET ORAL DAILY
Status: DISCONTINUED | OUTPATIENT
Start: 2025-03-26 | End: 2025-03-31 | Stop reason: HOSPADM

## 2025-03-25 RX ORDER — METHYLPREDNISOLONE SODIUM SUCCINATE 125 MG/2ML
125 INJECTION INTRAMUSCULAR; INTRAVENOUS EVERY 6 HOURS
Status: DISCONTINUED | OUTPATIENT
Start: 2025-03-25 | End: 2025-03-26

## 2025-03-25 RX ORDER — FLUCONAZOLE 2 MG/ML
200 INJECTION, SOLUTION INTRAVENOUS EVERY 24 HOURS
Status: DISCONTINUED | OUTPATIENT
Start: 2025-03-25 | End: 2025-03-31 | Stop reason: HOSPADM

## 2025-03-25 RX ORDER — DEXTROSE MONOHYDRATE 100 MG/ML
INJECTION, SOLUTION INTRAVENOUS CONTINUOUS PRN
Status: DISCONTINUED | OUTPATIENT
Start: 2025-03-25 | End: 2025-03-31 | Stop reason: HOSPADM

## 2025-03-25 RX ORDER — PRIMIDONE 50 MG/1
50 TABLET ORAL AT BEDTIME
Status: DISCONTINUED | OUTPATIENT
Start: 2025-03-25 | End: 2025-03-31 | Stop reason: HOSPADM

## 2025-03-25 RX ORDER — POTASSIUM CHLORIDE 20MEQ/15ML
40 LIQUID (ML) ORAL ONCE
Status: COMPLETED | OUTPATIENT
Start: 2025-03-25 | End: 2025-03-25

## 2025-03-25 RX ADMIN — POTASSIUM CHLORIDE 40 MEQ: 20 SOLUTION ORAL at 05:02

## 2025-03-25 RX ADMIN — SODIUM CHLORIDE, SODIUM LACTATE, POTASSIUM CHLORIDE, AND CALCIUM CHLORIDE: .6; .31; .03; .02 INJECTION, SOLUTION INTRAVENOUS at 13:47

## 2025-03-25 RX ADMIN — INSULIN HUMAN 3 UNITS/HR: 1 INJECTION, SOLUTION INTRAVENOUS at 11:06

## 2025-03-25 RX ADMIN — NOREPINEPHRINE BITARTRATE 0.11 MCG/KG/MIN: 0.02 INJECTION, SOLUTION INTRAVENOUS at 08:36

## 2025-03-25 RX ADMIN — AMIODARONE HYDROCHLORIDE 0.5 MG/MIN: 1.8 INJECTION, SOLUTION INTRAVENOUS at 16:53

## 2025-03-25 RX ADMIN — PIPERACILLIN AND TAZOBACTAM 3.38 G: 3; .375 INJECTION, POWDER, FOR SOLUTION INTRAVENOUS at 17:48

## 2025-03-25 RX ADMIN — AMIODARONE HYDROCHLORIDE 0.5 MG/MIN: 1.8 INJECTION, SOLUTION INTRAVENOUS at 05:12

## 2025-03-25 RX ADMIN — IPRATROPIUM BROMIDE AND ALBUTEROL SULFATE 3 ML: .5; 3 SOLUTION RESPIRATORY (INHALATION) at 11:11

## 2025-03-25 RX ADMIN — IPRATROPIUM BROMIDE AND ALBUTEROL SULFATE 3 ML: .5; 3 SOLUTION RESPIRATORY (INHALATION) at 19:16

## 2025-03-25 RX ADMIN — PIPERACILLIN AND TAZOBACTAM 3.38 G: 3; .375 INJECTION, POWDER, FOR SOLUTION INTRAVENOUS at 04:06

## 2025-03-25 RX ADMIN — CHLORHEXIDINE GLUCONATE ORAL RINSE 15 ML: 1.2 SOLUTION DENTAL at 08:38

## 2025-03-25 RX ADMIN — CHLORHEXIDINE GLUCONATE ORAL RINSE 15 ML: 1.2 SOLUTION DENTAL at 19:43

## 2025-03-25 RX ADMIN — PANTOPRAZOLE SODIUM 40 MG: 40 INJECTION, POWDER, FOR SOLUTION INTRAVENOUS at 08:54

## 2025-03-25 RX ADMIN — Medication 1250 MG: at 12:35

## 2025-03-25 RX ADMIN — PRIMIDONE 50 MG: 50 TABLET ORAL at 22:07

## 2025-03-25 RX ADMIN — SODIUM CHLORIDE, SODIUM LACTATE, POTASSIUM CHLORIDE, AND CALCIUM CHLORIDE: .6; .31; .03; .02 INJECTION, SOLUTION INTRAVENOUS at 01:57

## 2025-03-25 RX ADMIN — IPRATROPIUM BROMIDE AND ALBUTEROL SULFATE 3 ML: .5; 3 SOLUTION RESPIRATORY (INHALATION) at 07:10

## 2025-03-25 RX ADMIN — Medication 150 MCG/HR: at 02:14

## 2025-03-25 RX ADMIN — METHYLPREDNISOLONE SODIUM SUCCINATE 125 MG: 125 INJECTION, POWDER, FOR SOLUTION INTRAMUSCULAR; INTRAVENOUS at 08:54

## 2025-03-25 RX ADMIN — Medication 25 MCG: at 09:12

## 2025-03-25 RX ADMIN — FLUCONAZOLE 200 MG: 2 INJECTION, SOLUTION INTRAVENOUS at 19:35

## 2025-03-25 RX ADMIN — NOREPINEPHRINE BITARTRATE 0.09 MCG/KG/MIN: 0.02 INJECTION, SOLUTION INTRAVENOUS at 16:10

## 2025-03-25 RX ADMIN — Medication 125 MCG/HR: at 16:37

## 2025-03-25 RX ADMIN — METHYLPREDNISOLONE SODIUM SUCCINATE 125 MG: 125 INJECTION, POWDER, FOR SOLUTION INTRAMUSCULAR; INTRAVENOUS at 19:41

## 2025-03-25 RX ADMIN — Medication 5 MG/HR: at 10:08

## 2025-03-25 RX ADMIN — HEPARIN SODIUM 1100 UNITS/HR: 10000 INJECTION, SOLUTION INTRAVENOUS at 16:51

## 2025-03-25 RX ADMIN — METHYLPREDNISOLONE SODIUM SUCCINATE 125 MG: 125 INJECTION, POWDER, FOR SOLUTION INTRAMUSCULAR; INTRAVENOUS at 13:54

## 2025-03-25 RX ADMIN — POTASSIUM CHLORIDE 40 MEQ: 20 SOLUTION ORAL at 10:46

## 2025-03-25 RX ADMIN — PIPERACILLIN AND TAZOBACTAM 3.38 G: 3; .375 INJECTION, POWDER, FOR SOLUTION INTRAVENOUS at 10:03

## 2025-03-25 RX ADMIN — IPRATROPIUM BROMIDE AND ALBUTEROL SULFATE 3 ML: .5; 3 SOLUTION RESPIRATORY (INHALATION) at 15:13

## 2025-03-25 RX ADMIN — IPRATROPIUM BROMIDE AND ALBUTEROL SULFATE 3 ML: .5; 3 SOLUTION RESPIRATORY (INHALATION) at 03:36

## 2025-03-25 RX ADMIN — NOREPINEPHRINE BITARTRATE 0.09 MCG/KG/MIN: 0.02 INJECTION, SOLUTION INTRAVENOUS at 01:50

## 2025-03-25 ASSESSMENT — ACTIVITIES OF DAILY LIVING (ADL)
ADLS_ACUITY_SCORE: 76
ADLS_ACUITY_SCORE: 76
ADLS_ACUITY_SCORE: 74
ADLS_ACUITY_SCORE: 76
ADLS_ACUITY_SCORE: 74
ADLS_ACUITY_SCORE: 76
ADLS_ACUITY_SCORE: 74
ADLS_ACUITY_SCORE: 76
ADLS_ACUITY_SCORE: 74
ADLS_ACUITY_SCORE: 74

## 2025-03-25 NOTE — PROGRESS NOTES
"CLINICAL NUTRITION SERVICES  -  ASSESSMENT NOTE    REASON FOR ASSESSMENT:  Provider Order - Registered Dietitian to Assess and Order TF per Medical Nutrition protocol    NUTRITION INTERVENTIONS  Recommendations / Nutrition Prescription  --Osmolite 1.5: Continuous feeds with a goal rate of 60ml/hr (1440 ml/hr).  --Initiation: Start at 10ml/hr for 24 hours.   --Advancement: Start advancing after 24 hours. Increase rate 5ml/hr every 8 hours if tolerating.  --Water Flushes: 30ml Q8H to maintain tube patency  --This provides 2160kcal, 90g protein, 1097ml free water, 0g fiber  --Monitor electrolytes, replace as indicated. Monitor bowel movements  --Future considerations, may need to consider diabetes formula       NUTRITION HISTORY  Marly Cannon is a 59 year old female admitted for acute hypoxic/hypercapnic respritaroty failure, suspected pneumonia, shock. PMH includes A-fib with RVR,insulin dependent type 2 diabetes, COPD. Pt remains intubated.  Plan to start tube feeds.     Allergies   No Known Allergies    CURRENT NUTRITION ORDERS  Diet Order:   Orders Placed This Encounter      NPO for Medical/Clinical Reasons Except for: No Exceptions  Current Intake/Tolerance: none, NPO and intubated    ANTHROPOMETRICS  Height: 5' 5\"  Weight: 213 lbs 13.54 oz  Body mass index is 35.59 kg/m .  Weight Status:  Obesity Grade II BMI 35-39.9  Weight History: appear fairly stable  Wt Readings from Last 10 Encounters:   03/25/25 97 kg (213 lb 13.5 oz)   03/22/25 90.4 kg (199 lb 4.7 oz)    03/18/25 88.1 kg (194 lb 3.6 oz)   03/17/25 86 kg (189 lb 11.2 oz)   01/23/25 92.5 kg (204 lb)   12/05/24 92.9 kg (204 lb 12.5 oz)   11/25/24 91.4 kg (201 lb 8 oz)   10/30/24 91.4 kg (201 lb 9.6 oz)   08/01/24 90.4 kg (199 lb 4.7 oz)   07/18/24 90.4 kg (199 lb 4.8 oz)   05/16/24 88.1 kg (194 lb 4.8 oz)        LABS  Labs reviewed    MEDICATIONS  Medications reviewed    ASSESSED NUTRITION NEEDS:  Estimated Energy Needs: 2312 kcals (PSU modified) 90.4kg " admit weight  Estimated Protein Needs:  grams protein (1.2-1.5 g pro/Kg) 67.9kg max ibw  Estimated Fluid Needs: 2312  mL (1 mL/Kcal)    Malnutrition Diagnosis: Patient does not meet two of the criteria necessary for diagnosing malnutrition    MONITORING AND EVALUATION:  Enteral nutrition, tolerance, intake, weight, labs

## 2025-03-25 NOTE — PLAN OF CARE
Goal Outcome Evaluation:      Plan of Care Reviewed With: patient    Overall Patient Progress: improvingOverall Patient Progress: improving    Patient is sedated and intubated, vent settings remain unchanged. [place] remains patent with clear yellow urine, see flowsheets for output. Patient turned and repositioned Q2H, suctioned Q2H and as needed. ETT tube at 22cm and OG at 57cm. Levophed weaned down this shift, see MAR. Heparin drip started this shift, see MAR. Insulin drip started this shift d/t high BG, hourly blood glucose completed and drip adjusted per orders. Other drips per MAR/orders. Art line remains in place, dampened waveform, zeroed Q4H.  Assessments as charted.       Face to face report given with opportunity to observe patient.    Report given to KHADIJAH Diaz RN   3/25/2025  7:32 AM

## 2025-03-25 NOTE — PROGRESS NOTES
Patient remains on ventilator with settings at:  Mode: PCV  RR: 22  FIO2: 80  PEEP 8  Inspiratory Pressure 30  Expiratory Vt 400-500 ml  ET tube secured at: 7.5 Size: 22  Cuff checked: Yes minimal seal  Breath sounds: clear  SpO2: >92%  Suction: small amount of clear secretions  Ambu bag present: Yes

## 2025-03-25 NOTE — PHARMACY-MEDICATION REGIMEN REVIEW
"Pharmacy Antimicrobial Stewardship Assessment     Current Antimicrobial Therapy:  Anti-infectives (From now, onward)      Start     Dose/Rate Route Frequency Ordered Stop    03/25/25 1230  vancomycin (VANCOCIN) 1,250 mg in 0.9% NaCl 250 mL intermittent infusion        Placed in \"Followed by\" Linked Group    1,250 mg  166.7 mL/hr over 90 Minutes Intravenous EVERY 24 HOURS 03/24/25 1217      03/23/25 1000  piperacillin-tazobactam (ZOSYN) 3.375 g vial to attach to  mL bag        Note to Pharmacy: For [facility], [facility] and [facility]: For Zosyn-naive patients, use the \"Zosyn initial dose + extended infusion\" order panel.    3.375 g  over 30 Minutes Intravenous EVERY 6 HOURS 03/23/25 0907              Indication: aspiration pneumonia, sepsis        Cultures:          Recommendations/Interventions:  1. none    Renea Wallace [facility]  March 25, 2025    "

## 2025-03-25 NOTE — PROGRESS NOTES
[facility]    Hospitalist Progress Note    Date of Service (when I saw the patient): 03/25/2025    Assessment & Plan   Marly Cannon is a 59 year old female who was admitted on 3/22/2025.     Acute hypoxic/hypercapnic respiratory failure: Patient intubated afternoon of 3/23.  Etiology uncertain, however possibility includes PE, aspiration, pneumonia, versus bad COPD.  CTA chest on admission showed clear lungs, small subsegmental PE.  Placed on therapeutic Lovenox at that time.  Patient is subsequently required BiPAP, then intubation over the course of 8 to 10 hours.  Serial chest x-rays have been stable/clear.  Worst ABG showed pH of 7.05 with pCO2 103, suggesting retention.COPD exacerbation?  -3/24: Intubated, sedated on 3/23.  Ventilator settings with FiO2 of 70%, PEEP of 5.  Latest ABG pH 7.34, pCO2 41, pO2 of 85..  Repeat CTA chest shows progression of right lower lobe pulmonary embolism, now involving the segmental as well as a subsegmental arteries.  There is collapse of the entire right lower lobe with opacification of bronchi within the right lower lobe, possible mucous plugging from aspiration of contents?  Continuing Zosyn  -3/25: Remains intubated, sedated.  Ventilator settings with FiO2 at 60%, PEEP of 5.  ABG with pH of 7.33.  Chest x-ray stable, persistent collapsed middle lobe.  On Zosyn, possible aspiration versus mucous plug.  Chest physiotherapy ordered by telemetry ICU.    Suspected pneumonia: initial CTA chest shows clear lungs, however patient tachypneic, increased work of breathing.  Oxygen requirement likely due to COPD.  Repeat CTA findings as above.  -Continuing Zosyn  -Continue to monitor respiratory status on ventilator    Shock: Uncertain etiology.  Will need to rule out obstructive shock with CTA.  Patient is now on moderate amount of Levophed.  She has been adequately volume resuscitated per sepsis protocol.  -3/24: Maintenance fluids.  Art line inserted, will titrate  pressors to MAP of 60.  Echocardiogram ordered.  WBC 15.5, Tmax 99.7 in last 24 hours.  Urinalysis negative for significant pyuria.  Initial CTA on admission showed clear lungs.  CTA without massive saddle embolus, CT abdomen pelvis with dilated gallbladder, but no biliary dilatation, no obvious obstruction.  No thickened gallbladder wall, no fluid collection.  CT also shows subcutaneous edema in the lower anterior abdominal wall and into the right labia.  Possible cellulitic infection?  Will continue IV Zosyn.  Adding vancomycin  -3/25: Patient with persistent vasopressor requirement, 0.08 Levophed.  Turned up periodically to 0.11.  Possible source of infections include aspiration pneumonia, cellulitis of lower abdominal wall/labia, gallbladder.  LFTs have remained unremarkable.  Continuing Zosyn/vancomycin    Encephalopathy: Toxic versus metabolic.  Patient is intubated and sedated.  -3/24: CT of the head negative for acute pathology.    Pulmonary embolism: Subsegmental with no right heart strain at least on CT.  Patient given therapeutic Lovenox in the emergency room.  -Echocardiogram ordered  -3/24: Currently on Lovenox twice daily.  Given worsening renal function, will switch her to heparin  -3/25: Continuing on heparin gtt    CVS:  Patient is initial EKG with A-fib RVR with heart rate in the 150s.  Patient received diltiazem, now she is converted.  She was on p.o. diltiazem 60 mg every 8 hours scheduled, as well as a diltiazem drip.  This was stopped due to hypotension.  Patient has also had self-limited runs of SVT.  -3/24: Shock requiring pressors.  Patient currently normal sinus rhythm but rate is tachy at 120.  Echocardiogram is ordered.  Given hypotension, cannot use calcium channel blocker or beta-blocker.  EKG shows SVT versus aflutter.  Will start her on amiodarone drip.  Continuing anticoagulation with heparin gtt  -3/25: Patient remains on Levophed, amiodarone.  Heart rate better controlled in the 1  teens to 120s, but still appears to be SVT/aflutter.  Continues on heparin drip as well.  Source of shock uncertain.  Echocardiogram official read with ejection fraction of 65 to 70%, hyperdynamic LV, no significant valvular or structural abnormalities.    Uncontrolled diabetes mellitus type 2, insulin-dependent: This is despite insulin pump.  Patient had functioning insulin pump on 3/20/2025 when she was discharged from this facility.  Anion gap is 17, but blood glucoses have been in the 200s.  Serum ketones resolved for a time, but now have returned.  Ketones uptrending  -3/23: Insulin pump removed.  Was given long-acting insulin 20 units of Toujeo this a.m., ketones continuing to uptrend.  Will place her back on insulin drip at this time.  -3/24: Patient on insulin drip.  Shutting off periodically for euglycemia.  -3/25: Patient intermittently on insulin drip.  Bicarb 17, anion gap closed at 15.  Holding subcu insulin in favor of insulin drip at this time    YADIRA: Creatinine rising since admission, now 1.64.  Previously slightly above 1, on presentation was 1.3.  Patient did receive contrast.  -3/24: Creatinine 1.91.  Maintaining IV fluids.  Monitoring renal function and urine output  -3/25: Creatinine climbing to 2.06 despite IV fluids.  Patient did receive IV contrast, possible contrast nephropathy.  Urine output has been adequate.    COPD: Oxygen dependence established last admission.  Patient was discharged on 2 L nasal cannula after last admission.    FEN: Diabetic diet as tolerated.    Clinically Significant Risk Factors        # Hypokalemia: Lowest K = 3.2 mmol/L in last 2 days, will replace as needed  # Hypernatremia: Highest Na = 146 mmol/L in last 2 days, will monitor as appropriate  # Hyperchloremia: Highest Cl = 114 mmol/L in last 2 days, will monitor as appropriate       # Hypercalcemia: corrected calcium is >10.1, will monitor as appropriate    # Hypoalbuminemia: Lowest albumin = 1.9 g/dL at  "3/24/2025  4:26 AM, will monitor as appropriate  # Coagulation Defect: INR = 1.29 (Ref range: 0.85 - 1.15) and/or PTT = 39 Seconds (Ref range: 22 - 38 Seconds), will monitor for bleeding     # Acute Kidney Injury, unspecified: based on a >150% or 0.3 mg/dL increase in last creatinine compared to past 90 day average, will monitor renal function  # Hypertension: Noted on problem list     # Acute Hypercapnic Respiratory Failure: based on arterial blood gas results.  Continue supplemental oxygen and ventilatory support as indicated.        # DMII: A1C = 12.7 % (Ref range: <5.7 %) within past 6 months   # Obesity: Estimated body mass index is 35.59 kg/m  as calculated from the following:    Height as of this encounter: 1.651 m (5' 5\").    Weight as of this encounter: 97 kg (213 lb 13.5 oz)., PRESENT ON ADMISSION     # COPD: noted on problem list        DVT Prophylaxis: Heparin IV    Code Status: Full Code    Disposition: Pending clinical improvement.    Bibi Cancino MD, MD        Interval History   Patient seen in room.  Patient is intubated, sedated.  No acute events overnight, no new symptoms.  Patient remains intubated, sedated on Levophed, amiodarone.    -Data reviewed today: I reviewed all new labs and imaging results over the last 24 hours. I personally reviewed imaging reports.    Physical Exam   Temp: 99.9  F (37.7  C) Temp src: Tympanic BP: 95/64 Pulse: 119   Resp: 22 SpO2: 95 % O2 Device: Mechanical Ventilator    Vitals:    03/23/25 0500 03/24/25 0428 03/25/25 0409   Weight: 90.4 kg (199 lb 4.7 oz) 93.8 kg (206 lb 12.7 oz) 97 kg (213 lb 13.5 oz)     Vital Signs with Ranges  Temp:  [98.3  F (36.8  C)-100.6  F (38.1  C)] 99.9  F (37.7  C)  Pulse:  [115-138] 119  Resp:  [19-35] 22  BP: ()/(38-84) 95/64  MAP:  [60 mmHg-83 mmHg] 62 mmHg  Arterial Line BP: ()/(38-71) 81/55  FiO2 (%):  [60 %] 60 %  SpO2:  [93 %-97 %] 95 %      Intake/Output Summary (Last 24 hours) at 3/25/2025 1022  Last data filed at " 3/25/2025 1021  Gross per 24 hour   Intake 5781.3 ml   Output 2175 ml   Net 3606.3 ml         Peripheral IV 03/23/25 Right Wrist (Active)   Site Assessment WDL 03/25/25 0600   Line Status Saline locked 03/25/25 0600   Dressing Transparent 03/25/25 0600   Dressing Status clean;dry;intact 03/24/25 2000   Line Intervention Flushed 03/25/25 0600   Phlebitis Scale 0-->no symptoms 03/25/25 0600   Infiltration? no 03/25/25 0600   Number of days: 2       Arterial Line 03/23/25 Radial (Active)   Site Assessment WDL 03/25/25 0600   Line Status Pulsatile blood flow;Cap changed 03/25/25 0600   Art Line Waveform Square wave test performed 03/25/25 0600   Art Line Interventions Zeroed and calibrated;Leveled;Connections checked and tightened;Flushed per protocol;Line pulled back 03/25/25 0400   Color/Movement/Sensation Capillary refill less than 3 sec 03/25/25 0600   Line Necessity Yes, meets criteria 03/25/25 0600   Dressing Type Transparent 03/25/25 0600   Dressing Status Clean, dry, intact 03/25/25 0600   Dressing Intervention Dressing changed/new dressing 03/24/25 1615   Number of days: 2       CVC Triple Lumen Right Internal jugular (Active)   Site Assessment WDL except;Bleeding 03/25/25 0600   Dressing Chlorhexidine disk;Transparent 03/25/25 0600   Dressing Status new drainage 03/25/25 0600   Dressing Intervention dressing changed 03/25/25 0400   Line Necessity Yes, meets criteria 03/25/25 0600   Blue - Status infusing 03/25/25 0600   Blue - Intervention Flushed 03/24/25 0800   Brown - Status infusing 03/25/25 0600   Brown - Intervention Flushed;Lab drawn;Cap change 03/24/25 2200   White - Status infusing 03/25/25 0600   White - Intervention Cap change;Lab drawn;Flushed 03/24/25 1615   Phlebitis Scale 0-->no symptoms 03/25/25 0600   Infiltration? no 03/24/25 0400   Number of days: 2       ETT Cuffed 7.5 mm (Active)   Secured at (cm) 22 cm 03/25/25 0710   Measured from Teeth/Gums 03/25/25 0400   Position Right 03/25/25 0710    Secured by Commercial tube haque 03/25/25 0710   Bite Block Included with Commercial tube haque 03/25/25 0710   Site Appearance Clean;Dry 03/25/25 0400   Tube Care Site care done 03/25/25 0400   Cuff Assessment Cuff Pressure 03/24/25 1506   Cuff Pressure - cm H2O 28 cmH20 03/24/25 1506   Safety Measures Manual resuscitator at bedside 03/24/25 1700   Number of days: 2       GI Enteral Access Device Mouth, center Gastric 18 fr (Active)   Status Suction-low intermittent 03/25/25 0900   Placement Confirmation [place] unchanged;Respiratory status unchanged (nasal/oral only) 03/25/25 0900   Surrounding Skin Assessment WDL 03/25/25 0900   Insertion Site Assessment WDL 03/25/25 0900   [place] (cm marking) at nare/mouth 57 cm 03/25/25 0900   [place] (visual) [place] at entry site (nare, mouth, etc.) 03/25/25 0900   Securement Device Tape 03/25/25 0900   Drainage Green;Brown 03/25/25 0900   Intake (mL) 9 ml 03/25/25 0900   Flush/Free Water (mL) 60 mL 03/25/25 0900   Output (mL) 500 ml 03/25/25 0600   Number of days: 2       Urinary Drain 03/23/25 Urethral Catheter (Active)   Tube Description Positional 03/25/25 0400   Catheter Care Catheter wipes 03/25/25 0400   Perineal Skin Assessment Excoriated;Erythema;Edema 03/25/25 0400   Collection Container Standard 03/25/25 0400   Securement Method Commercial securement device 03/25/25 0400   Rationale for Continued Use ICU only: hourly urine output needed for patient care 03/25/25 0400   Urine Output 110 mL 03/25/25 0828   Number of days: 2     No line/device    Constitutional - intubated, sedated, obese  HEENT - atraumatic, normocephalic  Neck - supple, no masses, no JVD  CVS - S1 S2 RRR, no murmurs, rubs, gallops  Respiratory - good air movement, slight coarseness bilaterally, no wheezes  GI - soft, appears tender right upper quadrant, ND, decreased bowel sounds, no organomegaly  Musculoskeletal - no LE edema, no lesions  Neuro - intubated, sedated, no gross focal  deficits      Medications   Current Facility-Administered Medications   Medication Dose Route Frequency Provider Last Rate Last Admin    amiodarone (NEXTERONE) 1.8 mg/mL in dextrose 5% 200 mL ADULT STANDARD infusion  0.5 mg/min Intravenous Continuous Bibi Cancino MD 16.7 mL/hr at 03/25/25 0759 0.5 mg/min at 03/25/25 0759    dextrose 10% infusion   Intravenous Continuous PRN Bibi Cancino MD        dextrose 10% infusion   Intravenous Continuous PRN Bibi Cancino MD        [Held by provider] diltiazem (CARDIZEM) 125 mg in dextrose 5 % 125 mL infusion  5 mg/hr Intravenous Continuous Bibi Cancino MD   Stopped at 03/23/25 1732    fentaNYL (SUBLIMAZE) infusion   mcg/hr Intravenous Continuous Gentry Saez DO 3 mL/hr at 03/25/25 0803 150 mcg/hr at 03/25/25 0803    heparin 25,000 units in 0.45% NaCl 250 mL ANTICOAGULANT infusion  0-5,000 Units/hr Intravenous Continuous Bibi Cancino MD 14 mL/hr at 03/25/25 0805 1,400 Units/hr at 03/25/25 0805    insulin regular (MYXREDLIN) 1 unit/mL infusion  0-24 Units/hr Intravenous Continuous Bibi Cancino MD 4 mL/hr at 03/25/25 0902 4 Units/hr at 03/25/25 0902    lactated ringers infusion   Intravenous Continuous Mayte Manjarrez MD 75 mL/hr at 03/25/25 0805 Rate Verify at 03/25/25 0805    midazolam (VERSED) 100 mg/100 mL NS infusion - ADULT  1-8 mg/hr Intravenous Continuous Gentry Saez DO 5 mL/hr at 03/25/25 1008 5 mg/hr at 03/25/25 1008    No lozenges or gum should be given while patient on BIPAP/AVAPS/AVAPS AE   Does not apply Continuous PRN Bibi Cancino MD        norepinephrine (LEVOPHED) 4 mg in  mL infusion PREMIX  0.01-0.6 mcg/kg/min Intravenous Continuous Olga Gaytan MD 37.3 mL/hr at 03/25/25 0836 0.11 mcg/kg/min at 03/25/25 0836    Patient is already receiving anticoagulation with heparin, enoxaparin (LOVENOX), warfarin (COUMADIN)  or other anticoagulant medication   Does not apply Continuous PRN Марина,  MD Te        vasopressin (VASOSTRICT) 20 Units in sodium chloride 0.9 % 100 mL standard conc infusion  2.4 Units/hr Intravenous Continuous Gentry Saez DO         Current Facility-Administered Medications   Medication Dose Route Frequency Provider Last Rate Last Admin    [Held by provider] aspirin (ASA) chewable tablet 81 mg  81 mg Oral Daily Te Parish MD   81 mg at 03/24/25 0948    [Held by provider] atorvastatin (LIPITOR) tablet 40 mg  40 mg Oral At Bedtime Te Parish MD   40 mg at 03/23/25 2301    chlorhexidine (PERIDEX) 0.12 % solution 15 mL  15 mL Mouth/Throat Q12H Gentry Saez DO   15 mL at 03/25/25 0838    [Held by provider] diltiazem (CARDIZEM) tablet 60 mg  60 mg Oral Q8H [facility] Te Parish MD   60 mg at 03/23/25 0514    [Held by provider] empagliflozin (JARDIANCE) tablet 25 mg  25 mg Oral Daily Te Parish MD   25 mg at 03/24/25 0948    [Held by provider] enoxaparin ANTICOAGULANT (LOVENOX) injection 70 mg  0.75 mg/kg Subcutaneous Q12H Te Parish MD   70 mg at 03/24/25 0956    [Held by provider] insulin glargine U-300 (TOUJEO) injection 20 Units  20 Units Subcutaneous QA Te Parish MD   20 Units at 03/23/25 0911    ipratropium - albuterol 0.5 mg/2.5 mg/3 mL (DUONEB) neb solution 3 mL  3 mL Nebulization 4x daily Delmy Harris MD        methylPREDNISolone Na Suc (solu-MEDROL) injection 125 mg  125 mg Intravenous Q6H Bibi Cancino MD   125 mg at 03/25/25 0854    pantoprazole (PROTONIX) 2 mg/mL suspension 40 mg  40 mg Per Feeding Tube QAM AC Gentry Saez DO        Or    pantoprazole (PROTONIX) IV push injection 40 mg  40 mg Intravenous QAM Gentry Peng DO   40 mg at 03/25/25 0854    piperacillin-tazobactam (ZOSYN) 3.375 g vial to attach to  mL bag  3.375 g Intravenous Q6H Bibi Cancino MD   3.375 g at 03/25/25 1003    primidone (MYSOLINE) tablet 50 mg  50 mg Oral At Bedtime  Te Parish MD   50 mg at 03/24/25 2207    vancomycin (VANCOCIN) 1,250 mg in 0.9% NaCl 250 mL intermittent infusion  1,250 mg Intravenous Q24H Bibi Cancino MD        Vitamin D3 (CHOLECALCIFEROL) tablet 25 mcg  25 mcg Oral Daily Te Parish MD   25 mcg at 03/25/25 0912       Data   Recent Labs   Lab 03/25/25  0956 03/25/25  0859 03/25/25  0759 03/25/25  0640 03/25/25  0501 03/25/25  0406 03/24/25  1701 03/24/25  1432 03/24/25  1232 03/24/25  1213 03/24/25  0503 03/24/25  0426 03/23/25  2218 03/23/25  2145 03/23/25  1856 03/23/25  1826   WBC  --   --   --   --   --  19.5*  --  20.5*  --  19.5*  --  15.5*  --   --   --   --    HGB  --   --   --   --   --  14.9  --  15.7  --  15.7  --  15.2  --  16.1*  --   --    MCV  --   --   --   --   --  94  --  96  --  96  --  97  --   --   --   --    PLT  --   --   --   --   --  302  --  346  --  342  --  303  --   --   --   --    INR  --   --   --   --   --   --   --   --   --   --   --   --   --   --   --  1.29*   NA  --   --   --   --   --  146*  --   --   --   --   --  144  --  142  --  143   POTASSIUM 3.4  --   --   --   --  3.2*  --   --   --   --   --  4.0  --  5.0  --  4.0   CHLORIDE  --   --   --   --   --  114*  --   --   --   --   --  111*  --  106  --  107   CO2  --   --   --   --   --  17*  --   --   --   --   --  21*  --  19*  --  24   BUN  --   --   --   --   --  61.9*  --   --   --   --   --  61.9*  --  59.0*  --  56.6*   CR  --   --   --   --   --  2.06*  --   --   --   --   --  1.91*  --  1.85*  --  1.91*   ANIONGAP  --   --   --   --   --  15  --   --   --   --   --  12  --  17*  --  12   NORM  --   --   --   --   --  8.2*  --   --   --   --   --  8.3*  --  8.2*  --  7.9*   GLC  --  140* 120* 127*   < > 162*   < >  --    < >  --    < > 111*   < > 219*   < > 157*   ALBUMIN  --   --   --   --   --  2.0*  --   --   --   --   --  1.9*  --   --   --   --    PROTTOTAL  --   --   --   --   --  5.8*  --   --   --   --   --  5.8*  --   --   --    --    BILITOTAL  --   --   --   --   --  0.2  --   --   --   --   --  0.3  --   --   --   --    ALKPHOS  --   --   --   --   --  99  --   --   --   --   --  120  --   --   --   --    ALT  --   --   --   --   --  20  --   --   --   --   --  16  --   --   --   --    AST  --   --   --   --   --  50*  --   --   --   --   --  31  --   --   --   --     < > = values in this interval not displayed.     Lactic Acid, Initial   Date Value Ref Range Status   03/22/2025 1.8 0.7 - 2.0 mmol/L Final       Recent Results (from the past 24 hours)   XR Chest Port 1 View    Narrative    PROCEDURE:  XR CHEST PORT 1 VIEW    HISTORY: intubated    COMPARISON: CT chest 3/24/2025    FINDINGS: Single view chest radiograph  Right internal jugular central venous catheter tip projects over the  SVC. Gastric tube sidehole projects over the stomach, within normal  limits, courses inferior to the field-of-view. Endotracheal tube tip  projects over the midthoracic trachea, within normal limits.  Cardiomediastinal silhouette is within normal limits.  Perihilar and peripheral interstitial opacities. Wedge-shaped opacity  in the right lower lobe, consistent with right lower lobe atelectasis.  Bilateral pleural effusions. No pneumothorax.    No suspicious osseous lesion or subdiaphragmatic free air.      Impression    IMPRESSION:    Stable pulmonary opacities and support devices.    JULIA STODDARD MD         SYSTEM ID:  [id number]       Bibi Cancino MD

## 2025-03-25 NOTE — PROGRESS NOTES
TELE ICU Progress Note          Assessment and Plan:     Marly Cannon is a 59 year old patient being cared for in the ICU for acute hypoxic hypercapnic respiratory failure and DKA. Pertinent assessment and recommendations include:  Neurology: # Acute toxic metabolic encephalopathy  #Sedation/Analgesia  -Versed and Fentanyl gtts for mechanical ventilation   -Liberate RASS goal to -1/-2   -Daily SAT as appropriate   Cardiovascular / Hemodynamics: # Shock, likely septic   # A-fib with RVR  # PE   -Continue levophed titrated to SBP > 95  -Continue heparin gtt for PE  -Continue amiodarone  -TTE 3/24 reassuring   Pulmonary: # Acute on chronic hypoxic hypercapnic respiratory failure   # COPD  - chronically on 2L  # Suspected RLL mucous plug  #Suspected pneumonia - consider aspiration given RLL location  -Sputum culture (ordered for you)  -Discontinue combivent; start Duonebs 4x daily per RT with chest physiotherapy (eg Volara if available) (ordered for you)  -Agree with steroids for possible AECOPD  -Continue vent support; wean FiO2 as tolerated  -SBT when appropriate   GI and Nutrition: # At risk for malnutrition  -Consult nutrition for tube feeds   Renal, Fluid and Electrolytes: # YADIRA  # Hypernatremia  # Hypokalemia  -Monitor urine output closely as we titrate down vasopressors  -Start free water flushes with tube feeds; monitor Na  -Lyte replacement per unit routine   Endocrinology: # Euglycemic DKA in setting of T2DM history  -Ketones normalized   -Continues on insulin gtt   ICU Checklist: CVC: present and needed  A-line: present and needed  Landrum: present and needed  DVT: heparin gtt  GI ppx: PPI    Primary Team Communication: Communicated with RN at bedside and Dr. Cancino   Billing: Total critical care time spent by me, excluding procedures, was 35 minutes.      Delmy Harris MD  3/25/2025           Hospital Course and Key events       The patient remains critically ill with Acute on chronic hypoxic and  hypercarbic respiratory failure, Shock, and Acute Kidney Injury.     Down to levophed 0.08. Last lactate 1.5  MRSA nares negative.  Blood culture NGTD.  Ketones 0.2 yesterday    CXR with persistent RLL opacification.               Medications:     I have reviewed this patient's current medications  Current Facility-Administered Medications   Medication Dose Route Frequency Provider Last Rate Last Admin    amiodarone (NEXTERONE) 1.8 mg/mL in dextrose 5% 200 mL ADULT STANDARD infusion  0.5 mg/min Intravenous Continuous Bibi Cancino MD 16.7 mL/hr at 03/25/25 0512 0.5 mg/min at 03/25/25 0512    dextrose 10% infusion   Intravenous Continuous PRN Bibi Cancino MD        [Held by provider] diltiazem (CARDIZEM) 125 mg in dextrose 5 % 125 mL infusion  5 mg/hr Intravenous Continuous Bibi Cancino MD   Stopped at 03/23/25 1732    fentaNYL (SUBLIMAZE) infusion   mcg/hr Intravenous Continuous Gentry Saez DO 3 mL/hr at 03/25/25 0502 150 mcg/hr at 03/25/25 0502    heparin 25,000 units in 0.45% NaCl 250 mL ANTICOAGULANT infusion  0-5,000 Units/hr Intravenous Continuous Bibi Cancino MD 14 mL/hr at 03/25/25 0609 1,400 Units/hr at 03/25/25 0609    insulin regular (MYXREDLIN) 1 unit/mL infusion  0-24 Units/hr Intravenous Continuous Bibi Cancino MD 4 mL/hr at 03/25/25 0700 4 Units/hr at 03/25/25 0700    lactated ringers infusion   Intravenous Continuous Mayte Manjarrez MD 75 mL/hr at 03/25/25 0500 Rate Verify at 03/25/25 0500    midazolam (VERSED) 100 mg/100 mL NS infusion - ADULT  1-8 mg/hr Intravenous Continuous Gentry Saez DO 5 mL/hr at 03/25/25 0502 5 mg/hr at 03/25/25 0502    No lozenges or gum should be given while patient on BIPAP/AVAPS/AVAPS AE   Does not apply Continuous PRN Bibi Cancino MD        norepinephrine (LEVOPHED) 4 mg in  mL infusion PREMIX  0.01-0.6 mcg/kg/min Intravenous Continuous Olga Gaytan MD 27.1 mL/hr at 03/25/25 0608 0.08 mcg/kg/min at 03/25/25  0608    Patient is already receiving anticoagulation with heparin, enoxaparin (LOVENOX), warfarin (COUMADIN)  or other anticoagulant medication   Does not apply Continuous PRN Te Parish MD        vasopressin (VASOSTRICT) 20 Units in sodium chloride 0.9 % 100 mL standard conc infusion  2.4 Units/hr Intravenous Continuous Gentry Saez,               Vitals and Exam:     Temp:  [98  F (36.7  C)-100.6  F (38.1  C)] 98.3  F (36.8  C)  Pulse:  [115-138] 117  Resp:  [19-35] 22  BP: ()/(38-84) 95/64  MAP:  [60 mmHg-83 mmHg] 67 mmHg  Arterial Line BP: ()/(38-71) 87/57  FiO2 (%):  [60 %] 60 %  SpO2:  [93 %-97 %] 95 %    Intake/Output Summary (Last 24 hours) at 3/24/2025 1405  Last data filed at 3/24/2025 1347  Gross per 24 hour   Intake 4000.1 ml   Output 985 ml   Net 3015.1 ml     Net IO Since Admission: 3,468.7 mL [03/24/25 1405]      FiO2 (%): 60 %, Resp: 22, Inspiratory Pressure (cm H2O) (Drager Elaina): 30, Vent Mode: PCV Plus assist, Resp Rate (Set): 22 breaths/min, PEEP (cm H2O): 5 cmH2O, Resp Rate (Set): 22 breaths/min, PEEP (cm H2O): 5 cmH2O    Physical Examination:   I examined this patient remotely using the telemedicine camera in the [facility]. The patient is located at [facility] ([place])    General Appearance:   Sedated and intubated    HEENT:   Endotrachael tube is present     Respiratory:   Good patient-ventilator synchrony     Neuro:   Sedation: heavily sedated and unawakable            Pertinent Data:     All pertinent labs and diagnostic studies were reviewed    Labs:  CMP  Recent Labs   Lab 03/25/25  0640 03/25/25  0601 03/25/25  0501 03/25/25  0406 03/24/25  0503 03/24/25  0426 03/23/25  2218 03/23/25  2145 03/23/25  1856 03/23/25  1826 03/23/25  1500 03/23/25  1404 03/22/25  1905 03/22/25  1731 03/18/25  1352 03/18/25  1209   NA  --   --   --  146*  --  144  --  142  --  143  --  141   < > 134*   < > 136   POTASSIUM  --   --   --  3.2*   --  4.0  --  5.0  --  4.0  --  4.1   < > 4.5   < > 5.0   CHLORIDE  --   --   --  114*  --  111*  --  106  --  107  --  103   < > 96*   < > 95*   CO2  --   --   --  17*  --  21*  --  19*  --  24  --  23   < > 21*   < > 27   ANIONGAP  --   --   --  15  --  12  --  17*  --  12  --  15   < > 17*   < > 14   * 157* 147* 162*   < > 111*   < > 219*   < > 157*   < > 248*   < > 460*   < > 497*   BUN  --   --   --  61.9*  --  61.9*  --  59.0*  --  56.6*  --  57.3*   < > 48.1*   < > 21.5   CR  --   --   --  2.06*  --  1.91*  --  1.85*  --  1.91*  --  1.74*   < > 1.32*   < > 1.37*   GFRESTIMATED  --   --   --  27*  --  29*  --  30*  --  29*  --  33*   < > 46*   < > 44*   NORM  --   --   --  8.2*  --  8.3*  --  8.2*  --  7.9*  --  8.9   < > 9.0   < > 9.3   MAG  --   --   --  2.2  --   --   --  2.2  --   --   --   --   --  2.0  --  1.9   PHOS  --   --   --  3.0  --   --   --  5.3*  --  5.9*  --  5.7*   < >  --   --   --    PROTTOTAL  --   --   --  5.8*  --  5.8*  --   --   --   --   --   --   --  6.6  --  7.7   ALBUMIN  --   --   --  2.0*  --  1.9*  --   --   --   --   --   --   --  2.6*  --  4.0   BILITOTAL  --   --   --  0.2  --  0.3  --   --   --   --   --   --   --  0.6  --  1.0   ALKPHOS  --   --   --  99  --  120  --   --   --   --   --   --   --  111  --  122   AST  --   --   --  50*  --  31  --   --   --   --   --   --   --  21  --  13   ALT  --   --   --  20  --  16  --   --   --   --   --   --   --  10  --  10    < > = values in this interval not displayed.     CBC  Recent Labs   Lab 03/25/25  0406 03/24/25  1432 03/24/25  1213 03/24/25  0426   WBC 19.5* 20.5* 19.5* 15.5*   RBC 4.87 5.09 5.06 4.93   HGB 14.9 15.7 15.7 15.2   HCT 45.8 48.7* 48.5* 48.0*   MCV 94 96 96 97   MCH 30.6 30.8 31.0 30.8   MCHC 32.5 32.2 32.4 31.7   RDW 14.9 14.8 14.9 14.8    346 342 303     INR  Recent Labs   Lab 03/23/25  1826   INR 1.29*     Arterial Blood Gas  Recent Labs   Lab 03/25/25  0330 03/24/25  7635 03/23/25  2410  03/23/25 2145 03/23/25 2019   PH 7.33* 7.34* 7.25*  --  7.24*   PCO2 38 41 42  --  52*   PO2 89 85 137*  --  79*   HCO3 20* 22 18*  --  22   O2PER 60 70 90 100 100     Lactic Acid   Date Value Ref Range Status   03/24/2025 1.5 0.7 - 2.0 mmol/L Final       Imaging:    TTE 3/24: LVEF 65-70%, normal RV size and function.  No significant valve pathology.

## 2025-03-26 ENCOUNTER — APPOINTMENT (OUTPATIENT)
Dept: GENERAL RADIOLOGY | Facility: HOSPITAL | Age: 60
DRG: 870 | End: 2025-03-26
Attending: INTERNAL MEDICINE
Payer: COMMERCIAL

## 2025-03-26 LAB
ALBUMIN SERPL BCG-MCNC: 1.8 G/DL (ref 3.5–5.2)
ALLEN'S TEST: ABNORMAL
ALP SERPL-CCNC: 88 U/L (ref 40–150)
ALT SERPL W P-5'-P-CCNC: 23 U/L (ref 0–50)
ANION GAP SERPL CALCULATED.3IONS-SCNC: 11 MMOL/L (ref 7–15)
AST SERPL W P-5'-P-CCNC: 54 U/L (ref 0–45)
ATRIAL RATE - MUSE: 74 BPM
BASE EXCESS BLDA CALC-SCNC: -4.9 MMOL/L (ref -3–3)
BILIRUB SERPL-MCNC: 0.2 MG/DL
BUN SERPL-MCNC: 55.1 MG/DL (ref 8–23)
CALCIUM SERPL-MCNC: 8.4 MG/DL (ref 8.8–10.4)
CHLORIDE SERPL-SCNC: 115 MMOL/L (ref 98–107)
CREAT SERPL-MCNC: 1.91 MG/DL (ref 0.51–0.95)
CRP SERPL-MCNC: 84.35 MG/L
DIASTOLIC BLOOD PRESSURE - MUSE: NORMAL MMHG
EGFRCR SERPLBLD CKD-EPI 2021: 29 ML/MIN/1.73M2
ERYTHROCYTE [DISTWIDTH] IN BLOOD BY AUTOMATED COUNT: 15.4 % (ref 10–15)
GLUCOSE BLDC GLUCOMTR-MCNC: 112 MG/DL (ref 70–99)
GLUCOSE BLDC GLUCOMTR-MCNC: 115 MG/DL (ref 70–99)
GLUCOSE BLDC GLUCOMTR-MCNC: 127 MG/DL (ref 70–99)
GLUCOSE BLDC GLUCOMTR-MCNC: 129 MG/DL (ref 70–99)
GLUCOSE BLDC GLUCOMTR-MCNC: 133 MG/DL (ref 70–99)
GLUCOSE BLDC GLUCOMTR-MCNC: 140 MG/DL (ref 70–99)
GLUCOSE BLDC GLUCOMTR-MCNC: 141 MG/DL (ref 70–99)
GLUCOSE BLDC GLUCOMTR-MCNC: 142 MG/DL (ref 70–99)
GLUCOSE BLDC GLUCOMTR-MCNC: 142 MG/DL (ref 70–99)
GLUCOSE BLDC GLUCOMTR-MCNC: 152 MG/DL (ref 70–99)
GLUCOSE BLDC GLUCOMTR-MCNC: 156 MG/DL (ref 70–99)
GLUCOSE BLDC GLUCOMTR-MCNC: 176 MG/DL (ref 70–99)
GLUCOSE SERPL-MCNC: 123 MG/DL (ref 70–99)
HCO3 BLD-SCNC: 20 MMOL/L (ref 21–28)
HCO3 SERPL-SCNC: 18 MMOL/L (ref 22–29)
HCT VFR BLD AUTO: 45.6 % (ref 35–47)
HGB BLD-MCNC: 14.6 G/DL (ref 11.7–15.7)
HOLD SPECIMEN: NORMAL
HOLD SPECIMEN: NORMAL
INTERPRETATION ECG - MUSE: NORMAL
MCH RBC QN AUTO: 30.4 PG (ref 26.5–33)
MCHC RBC AUTO-ENTMCNC: 32 G/DL (ref 31.5–36.5)
MCV RBC AUTO: 95 FL (ref 78–100)
O2/TOTAL GAS SETTING VFR VENT: 75 %
OXYHGB MFR BLDA: 96 % (ref 92–100)
P AXIS - MUSE: 66 DEGREES
PCO2 BLD: 34 MM HG (ref 35–45)
PEEP: 8 CM H2O
PH BLD: 7.37 [PH] (ref 7.35–7.45)
PLATELET # BLD AUTO: 252 10E3/UL (ref 150–450)
PO2 BLD: 79 MM HG (ref 80–105)
POTASSIUM SERPL-SCNC: 3.8 MMOL/L (ref 3.4–5.3)
PR INTERVAL - MUSE: 182 MS
PROT SERPL-MCNC: 5.5 G/DL (ref 6.4–8.3)
QRS DURATION - MUSE: 106 MS
QT - MUSE: 402 MS
QTC - MUSE: 446 MS
R AXIS - MUSE: 58 DEGREES
RBC # BLD AUTO: 4.8 10E6/UL (ref 3.8–5.2)
SAO2 % BLDA: 96.5 % (ref 96–97)
SODIUM SERPL-SCNC: 144 MMOL/L (ref 135–145)
SYSTOLIC BLOOD PRESSURE - MUSE: NORMAL MMHG
T AXIS - MUSE: 60 DEGREES
UFH PPP CHRO-ACNC: 0.48 IU/ML
UFH PPP CHRO-ACNC: 0.6 IU/ML
VANCOMYCIN SERPL-MCNC: 18 UG/ML
VENTRICULAR RATE- MUSE: 74 BPM
WBC # BLD AUTO: 22.1 10E3/UL (ref 4–11)

## 2025-03-26 PROCEDURE — 999N000065 XR CHEST PORT 1 VIEW

## 2025-03-26 PROCEDURE — 250N000009 HC RX 250: Performed by: INTERNAL MEDICINE

## 2025-03-26 PROCEDURE — 94640 AIRWAY INHALATION TREATMENT: CPT

## 2025-03-26 PROCEDURE — 86140 C-REACTIVE PROTEIN: CPT | Performed by: INTERNAL MEDICINE

## 2025-03-26 PROCEDURE — 258N000003 HC RX IP 258 OP 636: Performed by: INTERNAL MEDICINE

## 2025-03-26 PROCEDURE — 250N000011 HC RX IP 250 OP 636: Performed by: INTERNAL MEDICINE

## 2025-03-26 PROCEDURE — 99233 SBSQ HOSP IP/OBS HIGH 50: CPT | Performed by: INTERNAL MEDICINE

## 2025-03-26 PROCEDURE — 85014 HEMATOCRIT: CPT | Performed by: INTERNAL MEDICINE

## 2025-03-26 PROCEDURE — 87070 CULTURE OTHR SPECIMN AEROBIC: CPT | Performed by: INTERNAL MEDICINE

## 2025-03-26 PROCEDURE — 36415 COLL VENOUS BLD VENIPUNCTURE: CPT | Performed by: INTERNAL MEDICINE

## 2025-03-26 PROCEDURE — 80202 ASSAY OF VANCOMYCIN: CPT | Performed by: INTERNAL MEDICINE

## 2025-03-26 PROCEDURE — 93005 ELECTROCARDIOGRAM TRACING: CPT

## 2025-03-26 PROCEDURE — 94668 MNPJ CHEST WALL SBSQ: CPT

## 2025-03-26 PROCEDURE — 85520 HEPARIN ASSAY: CPT | Performed by: INTERNAL MEDICINE

## 2025-03-26 PROCEDURE — 200N000001 HC R&B ICU

## 2025-03-26 PROCEDURE — 82805 BLOOD GASES W/O2 SATURATION: CPT | Performed by: INTERNAL MEDICINE

## 2025-03-26 PROCEDURE — 93010 ELECTROCARDIOGRAM REPORT: CPT | Performed by: INTERNAL MEDICINE

## 2025-03-26 PROCEDURE — 250N000013 HC RX MED GY IP 250 OP 250 PS 637: Performed by: INTERNAL MEDICINE

## 2025-03-26 PROCEDURE — 84155 ASSAY OF PROTEIN SERUM: CPT | Performed by: INTERNAL MEDICINE

## 2025-03-26 PROCEDURE — 94003 VENT MGMT INPAT SUBQ DAY: CPT

## 2025-03-26 PROCEDURE — 94640 AIRWAY INHALATION TREATMENT: CPT | Mod: 76

## 2025-03-26 PROCEDURE — 999N000157 HC STATISTIC RCP TIME EA 10 MIN

## 2025-03-26 RX ORDER — VANCOMYCIN HYDROCHLORIDE 1 G/200ML
1000 INJECTION, SOLUTION INTRAVENOUS EVERY 24 HOURS
Status: DISCONTINUED | OUTPATIENT
Start: 2025-03-26 | End: 2025-03-27

## 2025-03-26 RX ORDER — METHYLPREDNISOLONE SODIUM SUCCINATE 125 MG/2ML
60 INJECTION INTRAMUSCULAR; INTRAVENOUS EVERY 12 HOURS
Status: COMPLETED | OUTPATIENT
Start: 2025-03-26 | End: 2025-03-28

## 2025-03-26 RX ADMIN — METHYLPREDNISOLONE SODIUM SUCCINATE 125 MG: 125 INJECTION, POWDER, FOR SOLUTION INTRAMUSCULAR; INTRAVENOUS at 02:01

## 2025-03-26 RX ADMIN — VANCOMYCIN HYDROCHLORIDE 1000 MG: 1 INJECTION, SOLUTION INTRAVENOUS at 12:52

## 2025-03-26 RX ADMIN — PIPERACILLIN AND TAZOBACTAM 3.38 G: 3; .375 INJECTION, POWDER, FOR SOLUTION INTRAVENOUS at 12:03

## 2025-03-26 RX ADMIN — METHYLPREDNISOLONE SODIUM SUCCINATE 62.5 MG: 125 INJECTION, POWDER, FOR SOLUTION INTRAMUSCULAR; INTRAVENOUS at 20:07

## 2025-03-26 RX ADMIN — NOREPINEPHRINE BITARTRATE 0.06 MCG/KG/MIN: 0.02 INJECTION, SOLUTION INTRAVENOUS at 02:57

## 2025-03-26 RX ADMIN — NOREPINEPHRINE BITARTRATE 0.03 MCG/KG/MIN: 0.02 INJECTION, SOLUTION INTRAVENOUS at 19:06

## 2025-03-26 RX ADMIN — FLUCONAZOLE 200 MG: 2 INJECTION, SOLUTION INTRAVENOUS at 18:57

## 2025-03-26 RX ADMIN — IPRATROPIUM BROMIDE AND ALBUTEROL SULFATE 3 ML: .5; 3 SOLUTION RESPIRATORY (INHALATION) at 07:22

## 2025-03-26 RX ADMIN — PRIMIDONE 50 MG: 50 TABLET ORAL at 22:12

## 2025-03-26 RX ADMIN — CHLORHEXIDINE GLUCONATE ORAL RINSE 15 ML: 1.2 SOLUTION DENTAL at 20:23

## 2025-03-26 RX ADMIN — INSULIN HUMAN 2 UNITS/HR: 1 INJECTION, SOLUTION INTRAVENOUS at 12:05

## 2025-03-26 RX ADMIN — Medication 25 MCG: at 08:42

## 2025-03-26 RX ADMIN — IPRATROPIUM BROMIDE AND ALBUTEROL SULFATE 3 ML: .5; 3 SOLUTION RESPIRATORY (INHALATION) at 11:58

## 2025-03-26 RX ADMIN — IPRATROPIUM BROMIDE AND ALBUTEROL SULFATE 3 ML: .5; 3 SOLUTION RESPIRATORY (INHALATION) at 15:19

## 2025-03-26 RX ADMIN — CHLORHEXIDINE GLUCONATE ORAL RINSE 15 ML: 1.2 SOLUTION DENTAL at 08:39

## 2025-03-26 RX ADMIN — AMIODARONE HYDROCHLORIDE 0.5 MG/MIN: 1.8 INJECTION, SOLUTION INTRAVENOUS at 06:14

## 2025-03-26 RX ADMIN — PIPERACILLIN AND TAZOBACTAM 3.38 G: 3; .375 INJECTION, POWDER, FOR SOLUTION INTRAVENOUS at 06:13

## 2025-03-26 RX ADMIN — PIPERACILLIN AND TAZOBACTAM 3.38 G: 3; .375 INJECTION, POWDER, FOR SOLUTION INTRAVENOUS at 00:15

## 2025-03-26 RX ADMIN — IPRATROPIUM BROMIDE AND ALBUTEROL SULFATE 3 ML: .5; 3 SOLUTION RESPIRATORY (INHALATION) at 19:22

## 2025-03-26 RX ADMIN — PIPERACILLIN AND TAZOBACTAM 3.38 G: 3; .375 INJECTION, POWDER, FOR SOLUTION INTRAVENOUS at 17:56

## 2025-03-26 RX ADMIN — SODIUM CHLORIDE, SODIUM LACTATE, POTASSIUM CHLORIDE, AND CALCIUM CHLORIDE: .6; .31; .03; .02 INJECTION, SOLUTION INTRAVENOUS at 04:30

## 2025-03-26 RX ADMIN — PANTOPRAZOLE SODIUM 40 MG: 40 INJECTION, POWDER, FOR SOLUTION INTRAVENOUS at 06:14

## 2025-03-26 RX ADMIN — AMIODARONE HYDROCHLORIDE 0.5 MG/MIN: 1.8 INJECTION, SOLUTION INTRAVENOUS at 16:40

## 2025-03-26 RX ADMIN — SODIUM CHLORIDE, SODIUM LACTATE, POTASSIUM CHLORIDE, AND CALCIUM CHLORIDE: .6; .31; .03; .02 INJECTION, SOLUTION INTRAVENOUS at 16:37

## 2025-03-26 ASSESSMENT — ACTIVITIES OF DAILY LIVING (ADL)
ADLS_ACUITY_SCORE: 70
ADLS_ACUITY_SCORE: 70
ADLS_ACUITY_SCORE: 74
ADLS_ACUITY_SCORE: 70
ADLS_ACUITY_SCORE: 74
ADLS_ACUITY_SCORE: 70
ADLS_ACUITY_SCORE: 74
ADLS_ACUITY_SCORE: 74
ADLS_ACUITY_SCORE: 70
ADLS_ACUITY_SCORE: 74
ADLS_ACUITY_SCORE: 70
ADLS_ACUITY_SCORE: 74
ADLS_ACUITY_SCORE: 74
ADLS_ACUITY_SCORE: 70
ADLS_ACUITY_SCORE: 74
ADLS_ACUITY_SCORE: 74
ADLS_ACUITY_SCORE: 70
ADLS_ACUITY_SCORE: 74
ADLS_ACUITY_SCORE: 70

## 2025-03-26 NOTE — PLAN OF CARE
Right wrist, Left wrist, and soft restraints restraints continued 3/25/2025    Clinical Justification: Pulling lines, pulling tubes, and pulling equipment  Less Restrictive Alternative: Repositioning, Re-evaluate equipment, Disguise equipment, Pain management, Alarm  Attending Provider Notified: Yes, Attending Provider's Name: Dr. Carroll   New orders placed Yes  Length of Order: 1 Day      Malka Brewer, RN

## 2025-03-26 NOTE — PLAN OF CARE
Plan of Care Reviewed With: Patient    Overall Patient Progress: Progressing    VS and assessments as charted. Pt remains sedated and intubated. Central line (R) internal jugular. No bleeding from CVC site this shift. PIV x1. Receiving Zosyn and Vancomycin. Weaned off Versed this shift. Infusions as follows: LR 75 ml/hr; Fentanyl 25 mcg/hr (RASS goal -1 to -2; CPOT goal 2 or less); Levophed 0.04 mcg/kg/min (to maintain SBP 95 mmHg or greater); Amiodarone 0.5 mg/min; Heparin 800 units/hr; Regular Insulin 4 units/hr (BG checks and drip titrated as per protocol). ETT 22 cm at the gum/teeth. Vent settings: pressure support 30; FiO2 55%; PEEP 8; RR 22. OG tube 57 cm at the lip. Tube feeding running at 15 ml/hr (refer to flowsheets for residuals). (L) radial Art line remains in place-zeroed and leveled this shift as documented. Landrum catheter patent; urine output marginal, but quantity sufficient. Medical healing restraints in place. Pt turned/repositioned and oral cares performed approximately q 2 hours. Free from fall and/or injury this shift. Bed locked and low; alarm on.     Face to face report given with opportunity to observe patient.    Report given to KHADIJAH Teague RN   3/26/2025  7:20 PM

## 2025-03-26 NOTE — PLAN OF CARE
Goal Outcome Evaluation:      Plan of Care Reviewed With: patient    Overall Patient Progress: improvingOverall Patient Progress: improving    Patient is sedated and intubated, vent settings remain unchanged. Landrum remains patent, urine output per flowsheets. Patient turned and repositioned Q2H, suctioned as needed and with turns. ETT tube remains at 22cm at the gums and OG at 57 cm at the lips. Drips titrated per orders, see MAR.  Assessments as charted.      Face to face report given with opportunity to observe patient.    Report given to KHADIJAH Diaz RN   3/26/2025  7:30 AM

## 2025-03-26 NOTE — PLAN OF CARE
Right wrist, Left wrist, and soft restraints restraints continued 3/26/2025    Clinical Justification: Pulling lines, pulling tubes, and pulling equipment  Less Restrictive Alternative: Repositioning, Re-evaluate equipment, Disguise equipment, Pain management, Alarm  Attending Provider Notified: Yes, Attending Provider's Name: Dr. Schulz  New orders placed Yes  Length of Order: 1 Day      Malka Brewer RN

## 2025-03-26 NOTE — PLAN OF CARE
Plan of Care Reviewed With: Patient, Tavo (significant other) and Mary (sister)    Overall Patient Progress: Not progressing    VS and assessments as charted. Pt remains sedated and intubated. Central line (R) internal jugular. Dressing changed x2 this shift due to bleeding from insertion site). PIV x1. Receiving Zosyn and Vancomycin IV. K+ replaced this shift; recheck 3.6. Infusions as follows: LR 75 ml/hr; Versed 4 mg/hr and Fentanyl 125 mcg/hr (RASS goal -1 to -2; CPOT goal 2 or less); Levophed 0.09 mcg/kg/min (to maintain MAP 65 or greater); Amiodarone 0.5 mg/min; Heparin 1100 units/hr; Regular Insulin 4 units/hr (BG checks and drip titrated as per protocol). ETT 22 cm at the gum/teeth. Vent settings: pressure support 30; FiO2 80%; PEEP 8; RR 22. OG tube 57 cm at the lip. Tube feeding running at 10 ml/hr. (L) radial Art line remains in place-zeroed and leveled this shift as documented. Landrum catheter patent; urine output quantity sufficient. Medically healing restraints in place. Pt turned/repositioned and oral cares performed approximately q 2 hours. Free from fall and/or injury this shift. Bed locked and low; alarm on.     Face to face report given with opportunity to observe patient.    Report given to KHADIJAH Lewis.    Malka Brewer RN   3/25/2025  8:47 PM

## 2025-03-26 NOTE — PHARMACY-MEDICATION REGIMEN REVIEW
"Pharmacy Antimicrobial Stewardship Assessment     Current Antimicrobial Therapy:  Anti-infectives (From now, onward)      Start     Dose/Rate Route Frequency Ordered Stop    03/26/25 1230  vancomycin (VANCOCIN) 1,000 mg in 200 mL dextrose intermittent infusion        Placed in \"Followed by\" Linked Group    1,000 mg  200 mL/hr over 1 Hours Intravenous EVERY 24 HOURS 03/26/25 0854      03/25/25 1900  fluconazole (DIFLUCAN) intermittent infusion 200 mg         200 mg  100 mL/hr over 60 Minutes Intravenous EVERY 24 HOURS 03/25/25 1840      03/23/25 1000  piperacillin-tazobactam (ZOSYN) 3.375 g vial to attach to  mL bag        Note to Pharmacy: For [facility], [facility] and [facility]: For Zosyn-naive patients, use the \"Zosyn initial dose + extended infusion\" order panel.    3.375 g  over 30 Minutes Intravenous EVERY 6 HOURS 03/23/25 0907                  Sputum cultures pending    Recommendations/Interventions:  1. Urine output is poor. Pharmacy will continue to monitor and adjust appropriately.    Renea Wallace, [facility]  March 26, 2025    "

## 2025-03-26 NOTE — PROGRESS NOTES
[facility]    Hospitalist Progress Note    59 years old female with a past medical history of hypertension, diabetes mellitus type 2, pulmonary emphysema, intellectual disability with memory issues, hyperparathyroidism and multiple other medical issues was brought to the emergency room for high blood sugars with shortness of breath. Complains of pain generalized symptoms in the chest but difficult to clarify. Not a good historian. Most of the history I also has been obtained from chart/caregivers. In the ED was noted to be tachycardic in the 130s and a subsequent EKG was concerning for SVT. Repeat EKG had shown A-fib with rapid ventricular response. COVID influenza and RSV is negative. CBC showed a white count of 16.4 with a hemoglobin of 16.8 and a platelet count of 260. CMP showed sodium 134 potassium 4.5 BUN of 48.1 with a creatinine of 1.32. AST/ALT was 21/10. VBG showed a pH of 1.39 with a pCO2 of 38 and a bicarb of 23. Lactic acid is 1.8. Ketones was 1.89. Follow-up CT chest shows a small pulmonary artery embolism to the subsegmental branches of the left lower lobe with no right heart strain. Patient was initiated on therapeutic Lovenox in addition to insulin drip for possible diabetic ketoacidosis. Cardizem infusion was initiated for SVT/A-fib. She was admitted for further evaluation.       Assessment & Plan  Marly Cannon is a 59 year old female who was admitted on 3/22/2025.      Acute hypoxic/hypercapnic respiratory failure: Patient intubated afternoon of 3/23.  Etiology uncertain, however possibility includes PE, aspiration, pneumonia, versus bad COPD.  CTA chest on admission showed clear lungs, small subsegmental PE.  Placed on therapeutic Lovenox at that time.  Patient is subsequently required BiPAP, then intubation over the course of 8 to 10 hours.  Serial chest x-rays have been stable/clear.  Worst ABG showed pH of 7.05 with pCO2 103, suggesting retention.COPD exacerbation?  -3/24:  Intubated, sedated on 3/23.  Ventilator settings with FiO2 of 70%, PEEP of 5.  Latest ABG pH 7.34, pCO2 41, pO2 of 85..  Repeat CTA chest shows progression of right lower lobe pulmonary embolism, now involving the segmental as well as a subsegmental arteries.  There is collapse of the entire right lower lobe with opacification of bronchi within the right lower lobe, possible mucous plugging from aspiration of contents?  Continuing Zosyn  -3/25: Remains intubated, sedated.  Ventilator settings with FiO2 at 60%, PEEP of 5.  ABG with pH of 7.33.  Chest x-ray stable, persistent collapsed middle lobe.  On Zosyn, possible aspiration versus mucous plug.  Chest physiotherapy ordered by telemetry ICU.  -3/26: Patient remains on the ventilator.  ABG with pH 7.37 pCO2 34 pO2 79 bicarb 20.  Sats been running 96+ percent.  She is on 80% FiO2.  Repeat chest x-ray no significant changes.  Still has what appears to be right lower lobe collapse.  Clear lungs on exam.  He is getting significant amount of IV fluid over the last 24 hours of most 5 L.  No significant wheezing.  Will try and titrate down on the FiO2.  Somewhat surprising requires so much oxygen at this point.  His lung fields specially left lung is relatively clear.  No significant secretions.  Sedated with the Versed and fentanyl.  Continue with aggressive pulmonary toilet.  Wean FiO2.  Is on antibiotics for potential aspiration.  Getting IV steroids every 6 hours will cut back on the dosing of this also.     Suspected pneumonia: initial CTA chest shows clear lungs, however patient tachypneic, increased work of breathing.  Oxygen requirement likely due to COPD.  Repeat CTA findings as above.  -Continuing Zosyn  -Continue to monitor respiratory status on ventilator  -3/26: Currently is on Zosyn/vancomycin.  MRSA/SA swab negative.  White count is 22,000.  No major fevers.  Blood cultures remain negative.  Minimal secretions.     Shock: Uncertain etiology.  Will need to  rule out obstructive shock with CTA.  Patient is now on moderate amount of Levophed.  She has been adequately volume resuscitated per sepsis protocol.  -3/24: Maintenance fluids.  Art line inserted, will titrate pressors to MAP of 60.  Echocardiogram ordered.  WBC 15.5, Tmax 99.7 in last 24 hours.  Urinalysis negative for significant pyuria.  Initial CTA on admission showed clear lungs.  CTA without massive saddle embolus, CT abdomen pelvis with dilated gallbladder, but no biliary dilatation, no obvious obstruction.  No thickened gallbladder wall, no fluid collection.  CT also shows subcutaneous edema in the lower anterior abdominal wall and into the right labia.  Possible cellulitic infection?  Will continue IV Zosyn.  Adding vancomycin  -3/25: Patient with persistent vasopressor requirement, 0.08 Levophed.  Turned up periodically to 0.11.  Possible source of infections include aspiration pneumonia, cellulitis of lower abdominal wall/labia, gallbladder.  LFTs have remained unremarkable.  Continuing Zosyn/vancomycin  -3/26: Blood pressures have been greater than 100 systolic.  He is on 0.05 of Levophed.  Will continue to titrate this down his tolerated.  Has received significant IV fluids at this point.  Urine output has been on the somewhat oliguric side.  Echocardiogram was a very technically difficult study the LV function was normal however.  Right ventricle did not appear to be grossly dilated.     Encephalopathy: Toxic versus metabolic.  Patient is intubated and sedated.  -3/24: CT of the head negative for acute pathology.  -3/26: Patient sedated at this point difficult to really assess her mental status.     Pulmonary embolism: Subsegmental with no right heart strain at least on CT.  Patient given therapeutic Lovenox in the emergency room.  -Echocardiogram ordered  -3/24: Currently on Lovenox twice daily.  Given worsening renal function, will switch her to heparin  -3/25: Continuing on heparin gtt  -3/26: PE  on heparin drip.     CVS:  Patient is initial EKG with A-fib RVR with heart rate in the 150s.  Patient received diltiazem, now she is converted.  She was on p.o. diltiazem 60 mg every 8 hours scheduled, as well as a diltiazem drip.  This was stopped due to hypotension.  Patient has also had self-limited runs of SVT.  -3/24: Shock requiring pressors.  Patient currently normal sinus rhythm but rate is tachy at 120.  Echocardiogram is ordered.  Given hypotension, cannot use calcium channel blocker or beta-blocker.  EKG shows SVT versus aflutter.  Will start her on amiodarone drip.  Continuing anticoagulation with heparin gtt  -3/25: Patient remains on Levophed, amiodarone.  Heart rate better controlled in the 1 teens to 120s, but still appears to be SVT/aflutter.  Continues on heparin drip as well.  Source of shock uncertain.  Echocardiogram official read with ejection fraction of 65 to 70%, hyperdynamic LV, no significant valvular or structural abnormalities.  -3/26: Heart rate rate around 115.  Is regular.  Cannot really tell on telemetry if this is not a flutter or sinus tachycardia will obtain twelve-lead EKG.  He is on amiodarone as apparently did have SVT and A-fib earlier.     Uncontrolled diabetes mellitus type 2, insulin-dependent: This is despite insulin pump.  Patient had functioning insulin pump on 3/20/2025 when she was discharged from this facility.  Anion gap is 17, but blood glucoses have been in the 200s.  Serum ketones resolved for a time, but now have returned.  Ketones uptrending  -3/23: Insulin pump removed.  Was given long-acting insulin 20 units of Toujeo this a.m., ketones continuing to uptrend.  Will place her back on insulin drip at this time.  -3/24: Patient on insulin drip.  Shutting off periodically for euglycemia.  -3/25: Patient intermittently on insulin drip.  Bicarb 17, anion gap closed at 15.  Holding subcu insulin in favor of insulin drip at this time  -3/26: Is on the insulin  infusion at this point.  Sugars are appropriate.  He is getting tube feedings at goal rate.     YADIRA: Creatinine rising since admission, now 1.64.  Previously slightly above 1, on presentation was 1.3.  Patient did receive contrast.  -3/24: Creatinine 1.91.  Maintaining IV fluids.  Monitoring renal function and urine output  -3/25: Creatinine climbing to 2.06 despite IV fluids.  Patient did receive IV contrast, possible contrast nephropathy.  Urine output has been adequate.  -3/26: Somewhat oliguric.  Urine output only 1 L over the last 24 hours.  A total of 5163 cc in.  BUN/creatinine are slightly improved to 55/1.91.  Potassium 3.8 sodium is 144.  Minimal lower extremity edema.  At some point may require at least some Lasix.     COPD: Oxygen dependence established last admission.  Patient was discharged on 2 L nasal cannula after last admission.     FEN: Diabetic diet as tolerated.             Diet: NPO for Medical/Clinical Reasons Except for: No Exceptions  Adult Formula Drip Feeding: Specified Time Osmolite 1.5; Orogastric tube; Goal Rate: 60; mL/hr; Keep at 10ml/hr for 24 hours. After 24 hours advance by 5ml/hr Q8H.  Fluids: LR 75 cc an hour    DVT Prophylaxis: Heparin infusion  Code Status: Full Code    Disposition: Expected discharge anticipate long-term  Prosper Schulz MD    Interval History   Patient is sedated not responsive orally intubated right central line is in place in the left arm art line.  She has been hemodynamically okay pressures greater than 100 systolic.  On 0.05 of the Levophed.  Is on IV amiodarone IV insulin IV fentanyl and Versed.  Is getting some low rate tube feedings.  Landrum catheter is in place.  Clear urine minimal output 1 L over the last 24 hours.  Oxygenation seems to be okay I will titrate down the FiO2.  Not sure she really needs 80% FiO2.  His lungs actually are relatively clear except for that right lower lobe collapse.  Titrate down Levophed as tolerated.  Will cut back on  the IV steroids.  Continue with current antibiotic regimen.    -Data reviewed today: I reviewed all new labs and imaging results over the last 24 hours. I personally reviewed the chest x-ray image(s) showing chest x-ray with no new findings.  Right lower lobe collapse.  ET tube central line appear to be in appropriate position.  No new infiltrates or worsening. .    Physical Exam   Temp: 98.2  F (36.8  C) Temp src: Tympanic BP: 97/68 Pulse: 111   Resp: 22 SpO2: 95 % O2 Device: Mechanical Ventilator    Vitals:    03/24/25 0428 03/25/25 0409 03/26/25 0622   Weight: 93.8 kg (206 lb 12.7 oz) 97 kg (213 lb 13.5 oz) 102.1 kg (225 lb 1.4 oz)     Vital Signs with Ranges  Temp:  [97.3  F (36.3  C)-100.1  F (37.8  C)] 98.2  F (36.8  C)  Pulse:  [110-124] 111  Resp:  [11-52] 22  BP: ()/(49-96) 97/68  MAP:  [49 mmHg-134 mmHg] 72 mmHg  Arterial Line BP: ()/() 93/62  FiO2 (%):  [60 %-100 %] 80 %  SpO2:  [86 %-96 %] 95 %  I/O last 3 completed shifts:  In: 5163.72 [I.V.:4728.72; NG/GT:287]  Out: 1020 [Urine:1020]    Peripheral IV 03/25/25 Anterior;Left Lower forearm (Active)   Site Assessment WDL 03/26/25 0600   Line Status Infusing 03/26/25 0600   Dressing Transparent 03/26/25 0600   Dressing Status clean;dry;intact 03/25/25 2000   Line Intervention Flushed 03/26/25 0400   Line Necessity Yes, meets criteria 03/26/25 0600   Phlebitis Scale 0-->no symptoms 03/26/25 0600   Infiltration? no 03/26/25 0600   Number of days: 1       Arterial Line 03/23/25 Radial (Active)   Site Assessment WDL 03/26/25 0600   Line Status Pulsatile blood flow 03/26/25 0600   Art Line Waveform Dampened;Square wave test performed 03/25/25 2000   Art Line Interventions Zeroed and calibrated;Leveled;Connections checked and tightened;Flushed per protocol;Line pulled back 03/26/25 0400   Color/Movement/Sensation Capillary refill less than 3 sec 03/26/25 0600   Line Necessity Yes, meets criteria 03/26/25 0600   Dressing Type Transparent 03/26/25  0600   Dressing Status Clean, dry, intact 03/26/25 0600   Dressing Intervention Dressing changed/new dressing 03/24/25 1615   Number of days: 3       CVC Triple Lumen Right Internal jugular (Active)   Site Assessment WDL 03/26/25 0600   Dressing Chlorhexidine disk;Transparent 03/26/25 0600   Dressing Status clean;dry 03/25/25 2000   Dressing Intervention dressing changed 03/26/25 0600   Line Necessity Yes, meets criteria 03/26/25 0600   Blue - Status infusing 03/26/25 0600   Blue - Intervention Lab drawn;Cap change 03/25/25 1525   Brown - Status infusing 03/26/25 0600   Brown - Intervention Lab drawn;Flushed;Cap change 03/25/25 1155   White - Status infusing 03/26/25 0600   White - Intervention Flushed 03/25/25 0800   Phlebitis Scale 0-->no symptoms 03/26/25 0600   Infiltration? no 03/26/25 0600   Number of days: 3       ETT Cuffed 7.5 mm (Active)   Secured at (cm) 22 cm 03/26/25 0722   Measured from Teeth/Gums 03/26/25 0722   Position Left 03/26/25 0722   Secured by Commercial tube haque 03/26/25 0722   Bite Block Included with Commercial tube haque 03/26/25 0722   Site Appearance Clean;Dry 03/26/25 0618   Tube Care Site care done 03/26/25 0400   Cuff Assessment Cuff Pressure 03/26/25 0722   Cuff Pressure - cm H2O 24 cmH20 03/26/25 0722   Safety Measures Manual resuscitator at bedside 03/24/25 1700   Number of days: 3       GI Enteral Access Device Mouth, center Gastric 18 fr (Active)   Status Feeding 03/26/25 0400   Placement Confirmation [place] unchanged 03/26/25 0400   Surrounding Skin Assessment WDL 03/26/25 0400   Insertion Site Assessment WDL 03/26/25 0400   [place] (cm marking) at nare/mouth 57 cm 03/26/25 0400   [place] (visual) [place] at entry site (nare, mouth, etc.) 03/26/25 0400   Securement Device Tape 03/26/25 0400   Drainage Green;Brown 03/26/25 0400   Intake (mL) 38 ml 03/25/25 1056   Flush/Free Water (mL) 30 mL 03/26/25 0400   Residual (mL) 200 mL 03/26/25 0400   Output (mL) 500 ml 03/25/25  0600   Number of days: 3       Urinary Drain 03/23/25 Urethral Catheter (Active)   Tube Description Positional 03/26/25 0815   Catheter Care Catheter wipes 03/26/25 0400   Perineal Skin Assessment Excoriated;Erythema;Edema 03/26/25 0815   Collection Container Standard 03/26/25 0815   Securement Method Commercial securement device 03/26/25 0815   Rationale for Continued Use ICU only: hourly urine output needed for patient care 03/26/25 0815   Urine Output 75 mL 03/26/25 0815   Number of days: 3     Requires right IJ central line and left arm art line.  Orally intubated.  NG tube in place along with Landrum catheter.    Constitutional: Patient is not interactive due to his current sedation.  No obvious distress.       HEENT: Oral ET tube and gastric tube in place.  Sclera are clear.   Right IJ central line is in place site secured.    Cardiovascular: Somewhat distant heart tones but a tachycardia and regular.  No audible murmurs.  JVD is difficult to assess.  Pulses are positive 2+ equal.       Respiratory: Actually moving good air bilaterally.  No audible wheezes or severe crackles.       Abdomen: Soft, nontender, nondistended, no organomegaly. Bowel sounds present    Extremities: Upper extremities do have a lot of edema.  Lower extremities just maybe 1+.  No pitting edema is noted.  Somewhat cool.       Neuro: Difficult to assess as she is completely sedated.   .  Medications   Current Facility-Administered Medications   Medication Dose Route Frequency Provider Last Rate Last Admin    amiodarone (NEXTERONE) 1.8 mg/mL in dextrose 5% 200 mL ADULT STANDARD infusion  0.5 mg/min Intravenous Continuous Bibi Cancino MD 16.7 mL/hr at 03/26/25 0809 0.5 mg/min at 03/26/25 0809    dextrose 10% infusion   Intravenous Continuous PRN Bibi Cancino MD        dextrose 10% infusion   Intravenous Continuous PRN Bibi Cancino MD        fentaNYL (SUBLIMAZE) infusion   mcg/hr Intravenous Continuous Gentry Saez, DO  0.5 mL/hr at 03/26/25 0809 25 mcg/hr at 03/26/25 0809    heparin 25,000 units in 0.45% NaCl 250 mL ANTICOAGULANT infusion  0-5,000 Units/hr Intravenous Continuous Bibi Cancino MD 8 mL/hr at 03/26/25 0811 800 Units/hr at 03/26/25 0811    insulin regular (MYXREDLIN) 1 unit/mL infusion  0-24 Units/hr Intravenous Continuous Bibi Cancino MD 4 mL/hr at 03/26/25 0812 4 Units/hr at 03/26/25 0812    lactated ringers infusion   Intravenous Continuous Mayte Manjarrez MD 75 mL/hr at 03/26/25 0812 Rate Verify at 03/26/25 0812    midazolam (VERSED) 100 mg/100 mL NS infusion - ADULT  1-8 mg/hr Intravenous Continuous Gentry Saez DO 1 mL/hr at 03/26/25 0813 1 mg/hr at 03/26/25 0813    No lozenges or gum should be given while patient on BIPAP/AVAPS/AVAPS AE   Does not apply Continuous PRN Bibi Cancino MD        norepinephrine (LEVOPHED) 4 mg in  mL infusion PREMIX  0.01-0.6 mcg/kg/min Intravenous Continuous Olga Gaytan MD 13.6 mL/hr at 03/26/25 0814 0.04 mcg/kg/min at 03/26/25 0814    Patient is already receiving anticoagulation with heparin, enoxaparin (LOVENOX), warfarin (COUMADIN)  or other anticoagulant medication   Does not apply Continuous PRN Te Parish MD        vasopressin (VASOSTRICT) 20 Units in sodium chloride 0.9 % 100 mL standard conc infusion  2.4 Units/hr Intravenous Continuous Gentry Saez DO         Current Facility-Administered Medications   Medication Dose Route Frequency Provider Last Rate Last Admin    [Held by provider] aspirin (ASA) chewable tablet 81 mg  81 mg Oral Daily Te Parish MD   81 mg at 03/24/25 0948    [Held by provider] atorvastatin (LIPITOR) tablet 40 mg  40 mg Oral At Bedtime Te Parish MD   40 mg at 03/23/25 2301    chlorhexidine (PERIDEX) 0.12 % solution 15 mL  15 mL Mouth/Throat Q12H Gentry Saez DO   15 mL at 03/25/25 1943    [Held by provider] diltiazem (CARDIZEM) tablet 60 mg  60 mg Oral Q8H DALIA  Te Parish MD   60 mg at 03/23/25 0514    [Held by provider] empagliflozin (JARDIANCE) tablet 25 mg  25 mg Oral Daily Te Parish MD   25 mg at 03/24/25 0948    [Held by provider] enoxaparin ANTICOAGULANT (LOVENOX) injection 70 mg  0.75 mg/kg Subcutaneous Q12H Te Parish MD   70 mg at 03/24/25 0956    fluconazole (DIFLUCAN) intermittent infusion 200 mg  200 mg Intravenous Q24H Bibi Cancino  mL/hr at 03/25/25 1935 200 mg at 03/25/25 1935    [Held by provider] insulin glargine U-300 (TOUJEO) injection 20 Units  20 Units Subcutaneous QAM Te Parish MD   20 Units at 03/23/25 0911    ipratropium - albuterol 0.5 mg/2.5 mg/3 mL (DUONEB) neb solution 3 mL  3 mL Nebulization 4x daily Delmy Harris MD   3 mL at 03/26/25 0722    methylPREDNISolone Na Suc (solu-MEDROL) injection 62.5 mg  62.5 mg Intravenous Q12H Prosper Schulz MD        pantoprazole (PROTONIX) 2 mg/mL suspension 40 mg  40 mg Per Feeding Tube QAM AC Gentry Saez P, DO        Or    pantoprazole (PROTONIX) IV push injection 40 mg  40 mg Intravenous QAM Gentry Peng, DO   40 mg at 03/26/25 0614    piperacillin-tazobactam (ZOSYN) 3.375 g vial to attach to  mL bag  3.375 g Intravenous Q6H Bibi Cancino MD   3.375 g at 03/26/25 0613    primidone (MYSOLINE) tablet 50 mg  50 mg Oral or Feeding Tube At Bedtime Bibi Cancino MD   50 mg at 03/25/25 2207    vancomycin (VANCOCIN) 1,250 mg in 0.9% NaCl 250 mL intermittent infusion  1,250 mg Intravenous Q24H Bibi Cancino .7 mL/hr at 03/25/25 1235 1,250 mg at 03/25/25 1235    Vitamin D3 (CHOLECALCIFEROL) tablet 25 mcg  25 mcg Oral or Feeding Tube Daily Bibi Cancino MD           Data   Recent Labs   Lab 03/26/25  0807 03/26/25  0606 03/26/25  0324 03/25/25  1615 03/25/25  1524 03/25/25  1015 03/25/25  0956 03/25/25  0501 03/25/25  0406 03/24/25  1701 03/24/25  1432 03/24/25  0503 03/24/25  0426 03/23/25  1856  03/23/25  1826   WBC  --   --  22.1*  --   --   --   --   --  19.5*  --  20.5*   < > 15.5*  --   --    HGB  --   --  14.6  --   --   --   --   --  14.9  --  15.7   < > 15.2   < >  --    MCV  --   --  95  --   --   --   --   --  94  --  96   < > 97  --   --    PLT  --   --  252  --   --   --   --   --  302  --  346   < > 303  --   --    INR  --   --   --   --   --   --   --   --   --   --   --   --   --   --  1.29*   NA  --   --  144  --   --   --   --   --  146*  --   --   --  144   < > 143   POTASSIUM  --   --  3.8  --  3.6  --  3.4  --  3.2*  --   --   --  4.0   < > 4.0   CHLORIDE  --   --  115*  --   --   --   --   --  114*  --   --   --  111*   < > 107   CO2  --   --  18*  --   --   --   --   --  17*  --   --   --  21*   < > 24   BUN  --   --  55.1*  --   --   --   --   --  61.9*  --   --   --  61.9*   < > 56.6*   CR  --   --  1.91*  --   --   --   --   --  2.06*  --   --   --  1.91*   < > 1.91*   ANIONGAP  --   --  11  --   --   --   --   --  15  --   --   --  12   < > 12   NOMR  --   --  8.4*  --   --   --   --   --  8.2*  --   --   --  8.3*   < > 7.9*   * 140* 123*   < >  --    < >  --    < > 162*   < >  --    < > 111*   < > 157*   ALBUMIN  --   --  1.8*  --   --   --   --   --  2.0*  --   --   --  1.9*  --   --    PROTTOTAL  --   --  5.5*  --   --   --   --   --  5.8*  --   --   --  5.8*  --   --    BILITOTAL  --   --  0.2  --   --   --   --   --  0.2  --   --   --  0.3  --   --    ALKPHOS  --   --  88  --   --   --   --   --  99  --   --   --  120  --   --    ALT  --   --  23  --   --   --   --   --  20  --   --   --  16  --   --    AST  --   --  54*  --   --   --   --   --  50*  --   --   --  31  --   --     < > = values in this interval not displayed.       Recent Results (from the past 24 hours)   XR Chest Port 1 View    Narrative    EXAM: XR CHEST PORT 1 VIEW  LOCATION: [facility]  DATE: 3/26/2025    INDICATION: intubated  COMPARISON: 3/23/2025, 3/25/2025      Impression    IMPRESSION:  Stable cardiomediastinal contours. Endotracheal tube tip 6 cm above the carlos. Enteric tube terminates below the diaphragm. Right IJ central venous catheter tip at mid SVC level. Similar appearance of right lower lobe collapse and left   basilar atelectasis or airspace disease. Small left pleural effusion. No visible pneumothorax.

## 2025-03-26 NOTE — PHARMACY-VANCOMYCIN DOSING SERVICE
"Pharmacy Vancomycin Note  Date of Service 2025  Patient's  1965   59 year old, female    Indication: Sepsis  Day of Therapy: 3  Current vancomycin regimen:  1250 mg IV q24h  Current vancomycin monitoring method: AUC  Current vancomycin therapeutic monitoring goal: 400-600 mg*h/L    InsightRX Prediction of Current Vancomycin Regimen  Loading dose: N/A  Regimen: 1250 mg IV every 24 hours.  Start time: 12:35 on 2025  Exposure target: AUC24 (range)400-600 mg/L.hr   AUC24,ss: 629 mg/L.hr  Probability of AUC24 > 400: 99 %  Ctrough,ss: 20.1 mg/L  Probability of Ctrough,ss > 20: 51 %  Probability of nephrotoxicity (Lodise GEORGE ): 17 %      Current estimated CrCl = Estimated Creatinine Clearance: 36.6 mL/min (A) (based on SCr of 1.91 mg/dL (H)).    Creatinine for last 3 days  3/23/2025: 10:01 AM Creatinine 1.70 mg/dL;  2:04 PM Creatinine 1.74 mg/dL;  6:26 PM Creatinine 1.91 mg/dL;  9:45 PM Creatinine 1.85 mg/dL  3/24/2025:  4:26 AM Creatinine 1.91 mg/dL  3/25/2025:  4:06 AM Creatinine 2.06 mg/dL  3/26/2025:  3:24 AM Creatinine 1.91 mg/dL    Recent Vancomycin Levels (past 3 days)  3/26/2025:  3:24 AM Vancomycin 18.0 ug/mL    Vancomycin IV Administrations (past 72 hours)                     vancomycin (VANCOCIN) 1,250 mg in 0.9% NaCl 250 mL intermittent infusion (mg) 1,250 mg New Bag 25 1235    vancomycin (VANCOCIN) 1,500 mg in 0.9% NaCl 250 mL intermittent infusion (mg) 1,500 mg New Bag 25 1314                    Nephrotoxins and other renal medications (From now, onward)      Start     Dose/Rate Route Frequency Ordered Stop    25 1230  vancomycin (VANCOCIN) 1,250 mg in 0.9% NaCl 250 mL intermittent infusion        Placed in \"Followed by\" Linked Group    1,250 mg  166.7 mL/hr over 90 Minutes Intravenous EVERY 24 HOURS 25 1217      25 1900  vasopressin (VASOSTRICT) 20 Units in sodium chloride 0.9 % 100 mL standard conc infusion         2.4 Units/hr  12 mL/hr  Intravenous " "CONTINUOUS 03/23/25 1858      03/23/25 1730  norepinephrine (LEVOPHED) 4 mg in  mL infusion PREMIX         0.01-0.6 mcg/kg/min × 90.4 kg  3.4-203.4 mL/hr  Intravenous CONTINUOUS 03/23/25 1728      03/23/25 1000  piperacillin-tazobactam (ZOSYN) 3.375 g vial to attach to  mL bag        Note to Pharmacy: For [facility], [facility] and [facility]: For Zosyn-naive patients, use the \"Zosyn initial dose + extended infusion\" order panel.    3.375 g  over 30 Minutes Intravenous EVERY 6 HOURS 03/23/25 0907      03/23/25 0900  [Held by provider]  empagliflozin (JARDIANCE) tablet 25 mg        (On hold since Mon 3/24/2025 at 1355 until manually unheld; held by Bibi Cancino MDHold Reason: NPO)   Note to Pharmacy: PTA Sig:Take 1 tablet (25 mg) by mouth daily      25 mg Oral DAILY 03/23/25 0632                 Contrast Orders - past 72 hours (72h ago, onward)      Start     Dose/Rate Route Frequency Stop    03/24/25 0900  iopamidol (ISOVUE-370) solution 85 mL         85 mL Intravenous ONCE 03/24/25 0913            Interpretation of levels and current regimen:  Vancomycin level is reflective of AUC greater than 600    Has serum creatinine changed greater than 50% in last 72 hours: No    Urine output:      Intake/Output Summary (Last 24 hours) at 3/26/2025 0902  Last data filed at 3/26/2025 0900  Gross per 24 hour   Intake 5204.72 ml   Output 985 ml   Net 4219.72 ml         Renal Function: Stable    InsightRX Prediction of Planned New Vancomycin Regimen  Loading dose: N/A  Regimen: 1000 mg IV every 24 hours.  Start time: 12:35 on 03/26/2025  Exposure target: AUC24 (range)400-600 mg/L.hr   AUC24,ss: 508 mg/L.hr  Probability of AUC24 > 400: 90 %  Ctrough,ss: 16.2 mg/L  Probability of Ctrough,ss > 20: 21 %  Probability of nephrotoxicity (Lodise GEORGE 2009): 12 %      Plan:  Decrease Dose to 1000 mg IV every 24 hours  Vancomycin monitoring method: AUC  Vancomycin therapeutic monitoring goal: 400-600 mg*h/L  Pharmacy will check vancomycin levels " as appropriate in 1-3 Days.  Serum creatinine levels will be ordered daily for the first week of therapy and at least twice weekly for subsequent weeks.    Renea Wallace RPH

## 2025-03-26 NOTE — PROGRESS NOTES
TELE ICU Progress Note          Assessment and Plan:     Marly Cannon is a 59 year old patient being cared for in the ICU for acute hypoxic hypercapnic respiratory failure, sepsis and DKA. Pertinent assessment and recommendations include:    Neurology: # Acute toxic metabolic encephalopathy  #Sedation/Analgesia  -Versed and Fentanyl gtts for mechanical ventilation. Continue to wean versed, change to PRN when able.  -Goa RASS -1/-2   -Daily SAT as appropriate   Cardiovascular / Hemodynamics: # Shock, likely septic   # A-fib with RVR  # Pulmonary embolism  -Continue levophed titrated to SBP > 95  -Continue heparin gtt for PE  -Continue amiodarone   Pulmonary: # Acute on chronic hypoxic and hypercapnic respiratory failure   # COPD  - chronically on 2L  # Suspected RLL mucous plug  #Suspected pneumonia - Right lower lobe opacity  -Sputum culture pending, sent 3/25  -Duonebs 4x daily per RT with chest physiotherapy  -Steroids for possible AECOPD  -Continue vent support; wean FiO2 as tolerated. History of COPD with chronic hypoxia on home oxygen.   Goal oxygen saturations 90-94%  -SBT when vent settings improved   GI and Nutrition: # At risk for malnutrition  -Tolerating trickle TF, Increase enteral feeds to goal   Renal, Fluid and Electrolytes: # YADIRA  # Hypernatremia- improved, 144 today  # Hypokalemia  -Monitor urine output   -Free water flushes with tube feeds; monitor Na  -Lyte replacement per unit routine  -more edematous per nursing, weight up. Hold diuresis for now due to vasopressor requirement.   Infectious Disease Sepsis likely due to pneumonia. Sputum culture pending  Continue Vanc / Zosyn.   Endocrinology: # Euglycemic DKA in setting of T2DM history  -Ketones normalized   -Continue on insulin gtt   ICU Checklist: CVC: present and needed  A-line: present and needed  Landrum: present and needed  DVT: heparin gtt  GI ppx: PPI    Primary Team Communication: Communicated with bedside nurse   Billing: Total  critical care time spent by me, excluding procedures, was 35 minutes.      Nimesh Contreras MD  3/26/2025           Hospital Course and Key events       The patient remains critically ill with Acute on chronic hypoxic and hypercarbic respiratory failure, Shock likely due to sepsis, and Acute Kidney Injury.     Current infusions reviewed:  Levophed at 0.04  Versed 1  Amio 0.05 mg/min  Fentanyl 25 mcg/hr  Heparin 800 unit(s)/hr               Medications:     I have reviewed this patient's current medications  Current Facility-Administered Medications   Medication Dose Route Frequency Provider Last Rate Last Admin    amiodarone (NEXTERONE) 1.8 mg/mL in dextrose 5% 200 mL ADULT STANDARD infusion  0.5 mg/min Intravenous Continuous Bibi Cancino MD 16.7 mL/hr at 03/26/25 0809 0.5 mg/min at 03/26/25 0809    dextrose 10% infusion   Intravenous Continuous PRN Bibi Cancino MD        dextrose 10% infusion   Intravenous Continuous PRN Bibi Cancino MD        fentaNYL (SUBLIMAZE) infusion   mcg/hr Intravenous Continuous Gentry Saez DO 0.5 mL/hr at 03/26/25 0809 25 mcg/hr at 03/26/25 0809    heparin 25,000 units in 0.45% NaCl 250 mL ANTICOAGULANT infusion  0-5,000 Units/hr Intravenous Continuous Bibi Cancino MD 8 mL/hr at 03/26/25 0811 800 Units/hr at 03/26/25 0811    insulin regular (MYXREDLIN) 1 unit/mL infusion  0-24 Units/hr Intravenous Continuous Bibi Cancino MD 4 mL/hr at 03/26/25 0812 4 Units/hr at 03/26/25 0812    lactated ringers infusion   Intravenous Continuous Mayte Manjarrez MD 75 mL/hr at 03/26/25 0812 Rate Verify at 03/26/25 0812    midazolam (VERSED) 100 mg/100 mL NS infusion - ADULT  1-8 mg/hr Intravenous Continuous Gentry Saez DO 1 mL/hr at 03/26/25 0813 1 mg/hr at 03/26/25 0813    No lozenges or gum should be given while patient on BIPAP/AVAPS/AVAPS AE   Does not apply Continuous PRN Bibi Cancino MD        norepinephrine (LEVOPHED) 4 mg in  mL infusion  PREMIX  0.01-0.6 mcg/kg/min Intravenous Continuous Olga Gaytan MD 13.6 mL/hr at 03/26/25 0814 0.04 mcg/kg/min at 03/26/25 0814    Patient is already receiving anticoagulation with heparin, enoxaparin (LOVENOX), warfarin (COUMADIN)  or other anticoagulant medication   Does not apply Continuous [facility] Te Parish MD        vasopressin (VASOSTRICT) 20 Units in sodium chloride 0.9 % 100 mL standard conc infusion  2.4 Units/hr Intravenous Continuous Gentry Saez DO              Vitals and Exam:     Temp:  [97.3  F (36.3  C)-100.1  F (37.8  C)] 98.4  F (36.9  C)  Pulse:  [110-124] 111  Resp:  [18-52] 22  BP: ()/(54-96) 104/74  MAP:  [62 mmHg-134 mmHg] 82 mmHg  Arterial Line BP: ()/() 111/70  FiO2 (%):  [60 %-100 %] 75 %  SpO2:  [86 %-97 %] 94 %    Intake/Output Summary (Last 24 hours) at 3/24/2025 1405  Last data filed at 3/24/2025 1347  Gross per 24 hour   Intake 4000.1 ml   Output 985 ml   Net 3015.1 ml     Net IO Since Admission: 3,468.7 mL [03/24/25 1405]      FiO2 (%): 75 % (decreased to 70%), Resp: 22, Inspiratory Pressure (cm H2O) (Drager Elaina): 30, Vent Mode: PCV Plus assist, Resp Rate (Set): 22 breaths/min, PEEP (cm H2O): 8 cmH2O, Resp Rate (Set): 22 breaths/min, PEEP (cm H2O): 8 cmH2O    Physical Examination:   I examined this patient remotely using the telemedicine camera in the [facility]. The patient is located at [facility] ICU ([place])    General Appearance:   Intubated, sedated   HEENT:   Endotrachael tube is present     Respiratory:   Good patient-ventilator synchrony     Neuro:   Sedation: Not arousable, no movement            Pertinent Data:     All pertinent labs and diagnostic studies were reviewed    Labs:  CMP  Recent Labs   Lab 03/26/25  0807 03/26/25  0606 03/26/25  0324 03/26/25  0157 03/25/25  1615 03/25/25  1524 03/25/25  1015 03/25/25  0956 03/25/25  0501 03/25/25  0406 03/24/25  0503 03/24/25  0426 03/23/25  5078  03/23/25  2145 03/23/25  1856 03/23/25  1826 03/23/25  1500 03/23/25  1404 03/22/25  1905 03/22/25  1731   NA  --   --  144  --   --   --   --   --   --  146*  --  144  --  142  --  143  --  141   < > 134*   POTASSIUM  --   --  3.8  --   --  3.6  --  3.4  --  3.2*  --  4.0  --  5.0  --  4.0  --  4.1   < > 4.5   CHLORIDE  --   --  115*  --   --   --   --   --   --  114*  --  111*  --  106  --  107  --  103   < > 96*   CO2  --   --  18*  --   --   --   --   --   --  17*  --  21*  --  19*  --  24  --  23   < > 21*   ANIONGAP  --   --  11  --   --   --   --   --   --  15  --  12  --  17*  --  12  --  15   < > 17*   * 140* 123* 115*   < >  --    < >  --    < > 162*   < > 111*   < > 219*   < > 157*   < > 248*   < > 460*   BUN  --   --  55.1*  --   --   --   --   --   --  61.9*  --  61.9*  --  59.0*  --  56.6*  --  57.3*   < > 48.1*   CR  --   --  1.91*  --   --   --   --   --   --  2.06*  --  1.91*  --  1.85*  --  1.91*  --  1.74*   < > 1.32*   GFRESTIMATED  --   --  29*  --   --   --   --   --   --  27*  --  29*  --  30*  --  29*  --  33*   < > 46*   NORM  --   --  8.4*  --   --   --   --   --   --  8.2*  --  8.3*  --  8.2*  --  7.9*  --  8.9   < > 9.0   MAG  --   --   --   --   --   --   --   --   --  2.2  --   --   --  2.2  --   --   --   --   --  2.0   PHOS  --   --   --   --   --   --   --   --   --  3.0  --   --   --  5.3*  --  5.9*  --  5.7*   < >  --    PROTTOTAL  --   --  5.5*  --   --   --   --   --   --  5.8*  --  5.8*  --   --   --   --   --   --   --  6.6   ALBUMIN  --   --  1.8*  --   --   --   --   --   --  2.0*  --  1.9*  --   --   --   --   --   --   --  2.6*   BILITOTAL  --   --  0.2  --   --   --   --   --   --  0.2  --  0.3  --   --   --   --   --   --   --  0.6   ALKPHOS  --   --  88  --   --   --   --   --   --  99  --  120  --   --   --   --   --   --   --  111   AST  --   --  54*  --   --   --   --   --   --  50*  --  31  --   --   --   --   --   --   --  21   ALT  --   --  23  --   --   --    --   --   --  20  --  16  --   --   --   --   --   --   --  10    < > = values in this interval not displayed.     CBC  Recent Labs   Lab 03/26/25  0324 03/25/25  0406 03/24/25  1432 03/24/25  1213   WBC 22.1* 19.5* 20.5* 19.5*   RBC 4.80 4.87 5.09 5.06   HGB 14.6 14.9 15.7 15.7   HCT 45.6 45.8 48.7* 48.5*   MCV 95 94 96 96   MCH 30.4 30.6 30.8 31.0   MCHC 32.0 32.5 32.2 32.4   RDW 15.4* 14.9 14.8 14.9    302 346 342     INR  Recent Labs   Lab 03/23/25  1826   INR 1.29*     Arterial Blood Gas  Recent Labs   Lab 03/26/25  0319 03/25/25  0330 03/24/25  0445 03/23/25  2347   PH 7.37 7.33* 7.34* 7.25*   PCO2 34* 38 41 42   PO2 79* 89 85 137*   HCO3 20* 20* 22 18*   O2PER 75 60 70 90     Lactic Acid   Date Value Ref Range Status   03/24/2025 1.5 0.7 - 2.0 mmol/L Final       Imaging:    TTE 3/24: LVEF 65-70%, normal RV size and function.  No significant valve pathology.

## 2025-03-26 NOTE — PROGRESS NOTES
Patient remains on ventilator with settings at:  Mode: PCV  RR: 22  FIO2: 55  PEEP 8  Inspiratory Pressure 30  Expiratory Vt 480 ml  ET tube secured at: 7.5 Size: 22  Cuff checked: Yes minimal seal  Breath sounds: coarse  SpO2: >92%  Suction: small amount of white secretions  Ambu bag present: Yes

## 2025-03-27 ENCOUNTER — APPOINTMENT (OUTPATIENT)
Dept: GENERAL RADIOLOGY | Facility: HOSPITAL | Age: 60
DRG: 870 | End: 2025-03-27
Attending: INTERNAL MEDICINE
Payer: COMMERCIAL

## 2025-03-27 LAB
ALBUMIN SERPL BCG-MCNC: 1.7 G/DL (ref 3.5–5.2)
ALBUMIN UR-MCNC: 10 MG/DL
ALLEN'S TEST: ABNORMAL
ALP SERPL-CCNC: 83 U/L (ref 40–150)
ALT SERPL W P-5'-P-CCNC: 23 U/L (ref 0–50)
ANION GAP SERPL CALCULATED.3IONS-SCNC: 11 MMOL/L (ref 7–15)
APPEARANCE UR: ABNORMAL
AST SERPL W P-5'-P-CCNC: 44 U/L (ref 0–45)
BACTERIA BLD CULT: NORMAL
BACTERIA BLD CULT: NORMAL
BACTERIA SPT CULT: ABNORMAL
BASE EXCESS BLDA CALC-SCNC: -3.4 MMOL/L (ref -3–3)
BILIRUB SERPL-MCNC: 0.3 MG/DL
BILIRUB UR QL STRIP: NEGATIVE
BUN SERPL-MCNC: 60.5 MG/DL (ref 8–23)
CALCIUM SERPL-MCNC: 8.6 MG/DL (ref 8.8–10.4)
CHLORIDE SERPL-SCNC: 117 MMOL/L (ref 98–107)
COLOR UR AUTO: ABNORMAL
CREAT SERPL-MCNC: 2.09 MG/DL (ref 0.51–0.95)
CREAT UR-MCNC: 81.6 MG/DL
EGFRCR SERPLBLD CKD-EPI 2021: 26 ML/MIN/1.73M2
ERYTHROCYTE [DISTWIDTH] IN BLOOD BY AUTOMATED COUNT: 15.2 % (ref 10–15)
GLUCOSE BLDC GLUCOMTR-MCNC: 113 MG/DL (ref 70–99)
GLUCOSE BLDC GLUCOMTR-MCNC: 122 MG/DL (ref 70–99)
GLUCOSE BLDC GLUCOMTR-MCNC: 128 MG/DL (ref 70–99)
GLUCOSE BLDC GLUCOMTR-MCNC: 129 MG/DL (ref 70–99)
GLUCOSE BLDC GLUCOMTR-MCNC: 134 MG/DL (ref 70–99)
GLUCOSE BLDC GLUCOMTR-MCNC: 139 MG/DL (ref 70–99)
GLUCOSE BLDC GLUCOMTR-MCNC: 141 MG/DL (ref 70–99)
GLUCOSE BLDC GLUCOMTR-MCNC: 141 MG/DL (ref 70–99)
GLUCOSE BLDC GLUCOMTR-MCNC: 153 MG/DL (ref 70–99)
GLUCOSE BLDC GLUCOMTR-MCNC: 154 MG/DL (ref 70–99)
GLUCOSE BLDC GLUCOMTR-MCNC: 155 MG/DL (ref 70–99)
GLUCOSE BLDC GLUCOMTR-MCNC: 156 MG/DL (ref 70–99)
GLUCOSE BLDC GLUCOMTR-MCNC: 159 MG/DL (ref 70–99)
GLUCOSE BLDC GLUCOMTR-MCNC: 165 MG/DL (ref 70–99)
GLUCOSE BLDC GLUCOMTR-MCNC: 166 MG/DL (ref 70–99)
GLUCOSE BLDC GLUCOMTR-MCNC: 169 MG/DL (ref 70–99)
GLUCOSE BLDC GLUCOMTR-MCNC: 182 MG/DL (ref 70–99)
GLUCOSE BLDC GLUCOMTR-MCNC: 182 MG/DL (ref 70–99)
GLUCOSE BLDC GLUCOMTR-MCNC: 191 MG/DL (ref 70–99)
GLUCOSE BLDC GLUCOMTR-MCNC: 204 MG/DL (ref 70–99)
GLUCOSE SERPL-MCNC: 123 MG/DL (ref 70–99)
GLUCOSE UR STRIP-MCNC: 500 MG/DL
GRAM STAIN RESULT: ABNORMAL
HCO3 BLD-SCNC: 21 MMOL/L (ref 21–28)
HCO3 SERPL-SCNC: 20 MMOL/L (ref 22–29)
HCT VFR BLD AUTO: 44.6 % (ref 35–47)
HGB BLD-MCNC: 14.1 G/DL (ref 11.7–15.7)
HGB UR QL STRIP: NEGATIVE
HOLD SPECIMEN: NORMAL
KETONES UR STRIP-MCNC: NEGATIVE MG/DL
LEUKOCYTE ESTERASE UR QL STRIP: NEGATIVE
MCH RBC QN AUTO: 30 PG (ref 26.5–33)
MCHC RBC AUTO-ENTMCNC: 31.6 G/DL (ref 31.5–36.5)
MCV RBC AUTO: 95 FL (ref 78–100)
MUCOUS THREADS #/AREA URNS LPF: PRESENT /LPF
NITRATE UR QL: NEGATIVE
O2/TOTAL GAS SETTING VFR VENT: 55 %
OXYHGB MFR BLDA: 88 % (ref 92–100)
PCO2 BLD: 34 MM HG (ref 35–45)
PEEP: 8 CM H2O
PH BLD: 7.39 [PH] (ref 7.35–7.45)
PH UR STRIP: 5.5 [PH] (ref 4.7–8)
PLATELET # BLD AUTO: 190 10E3/UL (ref 150–450)
PO2 BLD: 56 MM HG (ref 80–105)
POTASSIUM SERPL-SCNC: 3.8 MMOL/L (ref 3.4–5.3)
PROT SERPL-MCNC: 5.2 G/DL (ref 6.4–8.3)
RBC # BLD AUTO: 4.7 10E6/UL (ref 3.8–5.2)
RBC URINE: 1 /HPF
SAO2 % BLDA: 89.6 % (ref 96–97)
SODIUM SERPL-SCNC: 148 MMOL/L (ref 135–145)
SODIUM UR-SCNC: <20 MMOL/L
SP GR UR STRIP: 1.03 (ref 1–1.03)
SQUAMOUS EPITHELIAL: 0 /HPF
UFH PPP CHRO-ACNC: 0.21 IU/ML
UFH PPP CHRO-ACNC: 0.21 IU/ML
UFH PPP CHRO-ACNC: 0.35 IU/ML
URATE CRY #/AREA URNS HPF: ABNORMAL /HPF
UROBILINOGEN UR STRIP-MCNC: NORMAL MG/DL
VANCOMYCIN SERPL-MCNC: 17.1 UG/ML
WBC # BLD AUTO: 17 10E3/UL (ref 4–11)
WBC URINE: 6 /HPF

## 2025-03-27 PROCEDURE — 999N000065 XR CHEST PORT 1 VIEW

## 2025-03-27 PROCEDURE — 94003 VENT MGMT INPAT SUBQ DAY: CPT

## 2025-03-27 PROCEDURE — 82570 ASSAY OF URINE CREATININE: CPT | Performed by: INTERNAL MEDICINE

## 2025-03-27 PROCEDURE — 84300 ASSAY OF URINE SODIUM: CPT | Performed by: INTERNAL MEDICINE

## 2025-03-27 PROCEDURE — 85018 HEMOGLOBIN: CPT | Performed by: INTERNAL MEDICINE

## 2025-03-27 PROCEDURE — 250N000013 HC RX MED GY IP 250 OP 250 PS 637: Performed by: INTERNAL MEDICINE

## 2025-03-27 PROCEDURE — 82310 ASSAY OF CALCIUM: CPT | Performed by: INTERNAL MEDICINE

## 2025-03-27 PROCEDURE — 250N000011 HC RX IP 250 OP 636: Performed by: INTERNAL MEDICINE

## 2025-03-27 PROCEDURE — 99233 SBSQ HOSP IP/OBS HIGH 50: CPT | Performed by: INTERNAL MEDICINE

## 2025-03-27 PROCEDURE — 94640 AIRWAY INHALATION TREATMENT: CPT

## 2025-03-27 PROCEDURE — 200N000001 HC R&B ICU

## 2025-03-27 PROCEDURE — 81001 URINALYSIS AUTO W/SCOPE: CPT | Performed by: INTERNAL MEDICINE

## 2025-03-27 PROCEDURE — 85520 HEPARIN ASSAY: CPT | Performed by: INTERNAL MEDICINE

## 2025-03-27 PROCEDURE — 94640 AIRWAY INHALATION TREATMENT: CPT | Mod: 76

## 2025-03-27 PROCEDURE — 250N000009 HC RX 250: Performed by: INTERNAL MEDICINE

## 2025-03-27 PROCEDURE — 258N000003 HC RX IP 258 OP 636: Performed by: INTERNAL MEDICINE

## 2025-03-27 PROCEDURE — 258N000002 HC RX IP 258 OP 250: Performed by: INTERNAL MEDICINE

## 2025-03-27 PROCEDURE — 82805 BLOOD GASES W/O2 SATURATION: CPT | Performed by: INTERNAL MEDICINE

## 2025-03-27 PROCEDURE — 80202 ASSAY OF VANCOMYCIN: CPT | Performed by: INTERNAL MEDICINE

## 2025-03-27 PROCEDURE — 94668 MNPJ CHEST WALL SBSQ: CPT

## 2025-03-27 PROCEDURE — 36592 COLLECT BLOOD FROM PICC: CPT | Performed by: INTERNAL MEDICINE

## 2025-03-27 PROCEDURE — 999N000157 HC STATISTIC RCP TIME EA 10 MIN

## 2025-03-27 RX ORDER — METOLAZONE 2.5 MG/1
5 TABLET ORAL DAILY
Status: DISCONTINUED | OUTPATIENT
Start: 2025-03-27 | End: 2025-03-31 | Stop reason: HOSPADM

## 2025-03-27 RX ORDER — PROPOFOL 10 MG/ML
5-75 INJECTION, EMULSION INTRAVENOUS CONTINUOUS
Status: DISCONTINUED | OUTPATIENT
Start: 2025-03-27 | End: 2025-03-31 | Stop reason: HOSPADM

## 2025-03-27 RX ORDER — SODIUM CHLORIDE 450 MG/100ML
INJECTION, SOLUTION INTRAVENOUS CONTINUOUS
Status: DISCONTINUED | OUTPATIENT
Start: 2025-03-27 | End: 2025-03-30

## 2025-03-27 RX ADMIN — SODIUM CHLORIDE: 4.5 INJECTION, SOLUTION INTRAVENOUS at 08:14

## 2025-03-27 RX ADMIN — PANTOPRAZOLE SODIUM 40 MG: 40 INJECTION, POWDER, FOR SOLUTION INTRAVENOUS at 06:02

## 2025-03-27 RX ADMIN — SODIUM CHLORIDE, SODIUM LACTATE, POTASSIUM CHLORIDE, AND CALCIUM CHLORIDE: .6; .31; .03; .02 INJECTION, SOLUTION INTRAVENOUS at 06:05

## 2025-03-27 RX ADMIN — PIPERACILLIN AND TAZOBACTAM 3.38 G: 3; .375 INJECTION, POWDER, FOR SOLUTION INTRAVENOUS at 06:02

## 2025-03-27 RX ADMIN — VANCOMYCIN HYDROCHLORIDE 1000 MG: 1 INJECTION, SOLUTION INTRAVENOUS at 13:15

## 2025-03-27 RX ADMIN — PRIMIDONE 50 MG: 50 TABLET ORAL at 22:02

## 2025-03-27 RX ADMIN — METHYLPREDNISOLONE SODIUM SUCCINATE 62.5 MG: 125 INJECTION, POWDER, FOR SOLUTION INTRAMUSCULAR; INTRAVENOUS at 08:40

## 2025-03-27 RX ADMIN — CHLORHEXIDINE GLUCONATE ORAL RINSE 15 ML: 1.2 SOLUTION DENTAL at 08:51

## 2025-03-27 RX ADMIN — FLUCONAZOLE 200 MG: 2 INJECTION, SOLUTION INTRAVENOUS at 18:39

## 2025-03-27 RX ADMIN — Medication 25 MCG/HR: at 00:26

## 2025-03-27 RX ADMIN — CHLORHEXIDINE GLUCONATE ORAL RINSE 15 ML: 1.2 SOLUTION DENTAL at 20:30

## 2025-03-27 RX ADMIN — HEPARIN SODIUM 800 UNITS/HR: 10000 INJECTION, SOLUTION INTRAVENOUS at 03:49

## 2025-03-27 RX ADMIN — METHYLPREDNISOLONE SODIUM SUCCINATE 62.5 MG: 125 INJECTION, POWDER, FOR SOLUTION INTRAMUSCULAR; INTRAVENOUS at 20:11

## 2025-03-27 RX ADMIN — IPRATROPIUM BROMIDE AND ALBUTEROL SULFATE 3 ML: .5; 3 SOLUTION RESPIRATORY (INHALATION) at 15:30

## 2025-03-27 RX ADMIN — IPRATROPIUM BROMIDE AND ALBUTEROL SULFATE 3 ML: .5; 3 SOLUTION RESPIRATORY (INHALATION) at 07:19

## 2025-03-27 RX ADMIN — INSULIN HUMAN 6 UNITS/HR: 1 INJECTION, SOLUTION INTRAVENOUS at 12:17

## 2025-03-27 RX ADMIN — PIPERACILLIN AND TAZOBACTAM 3.38 G: 3; .375 INJECTION, POWDER, FOR SOLUTION INTRAVENOUS at 00:02

## 2025-03-27 RX ADMIN — METOLAZONE 5 MG: 2.5 TABLET ORAL at 17:38

## 2025-03-27 RX ADMIN — PIPERACILLIN AND TAZOBACTAM 3.38 G: 3; .375 INJECTION, POWDER, FOR SOLUTION INTRAVENOUS at 12:23

## 2025-03-27 RX ADMIN — IPRATROPIUM BROMIDE AND ALBUTEROL SULFATE 3 ML: .5; 3 SOLUTION RESPIRATORY (INHALATION) at 11:09

## 2025-03-27 RX ADMIN — Medication 25 MCG: at 08:48

## 2025-03-27 RX ADMIN — HEPARIN SODIUM 1100 UNITS/HR: 10000 INJECTION, SOLUTION INTRAVENOUS at 22:21

## 2025-03-27 RX ADMIN — PIPERACILLIN AND TAZOBACTAM 3.38 G: 3; .375 INJECTION, POWDER, FOR SOLUTION INTRAVENOUS at 17:35

## 2025-03-27 RX ADMIN — IPRATROPIUM BROMIDE AND ALBUTEROL SULFATE 3 ML: .5; 3 SOLUTION RESPIRATORY (INHALATION) at 19:29

## 2025-03-27 ASSESSMENT — ACTIVITIES OF DAILY LIVING (ADL)
ADLS_ACUITY_SCORE: 74
ADLS_ACUITY_SCORE: 74
ADLS_ACUITY_SCORE: 70
ADLS_ACUITY_SCORE: 74
ADLS_ACUITY_SCORE: 70
ADLS_ACUITY_SCORE: 74
ADLS_ACUITY_SCORE: 70
ADLS_ACUITY_SCORE: 70
ADLS_ACUITY_SCORE: 74
ADLS_ACUITY_SCORE: 74
ADLS_ACUITY_SCORE: 70
ADLS_ACUITY_SCORE: 74
ADLS_ACUITY_SCORE: 70
ADLS_ACUITY_SCORE: 74

## 2025-03-27 NOTE — PROGRESS NOTES
TELE ICU Progress Note          Assessment and Plan:     Marly Cannon is a 59 year old patient being cared for in the ICU for acute hypoxic hypercapnic respiratory failure, sepsis and DKA. Pertinent assessment and recommendations include:    Neurology: # Acute toxic metabolic encephalopathy  #Sedation/Analgesia  -discontinue Versed drip; transition to precedex or propofol in sedation needed  -Goal RASS 0/-1   -Daily SAT as appropriate   Cardiovascular / Hemodynamics: # Shock, likely septic   # A-fib with RVR: resolved  # Pulmonary embolism  -discontinue levophed  -Continue heparin gtt for PE  -stop amio  - recommend Mg >2  - recommend K 4.5     Pulmonary: # Acute on chronic hypoxic and hypercapnic respiratory failure   # COPD  - chronically on 2L  # Suspected RLL mucous plug  #Suspected pneumonia - Right lower lobe opacity  # PE  -Sputum culture pending, sent 3/25  -Duonebs 4x daily per RT with chest physiotherapy  -Steroids for possible AECOPD  -Continue vent support; wean FiO2 as tolerated. History of COPD with chronic hypoxia on home oxygen.   Goal oxygen saturations 90-94%  -SBT when vent settings improved  - continue Heparin drip (can consider transition to oral anticoagulant)   GI and Nutrition: # At risk for malnutrition  -Tolerating trickle TF, Increase enteral feeds to goal   Renal, Fluid and Electrolytes: # YADIRA  # Hypernatremia- 148 today  # Hypokalemia: resolved  -Monitor urine output   -Free water flushes with tube feeds; monitor Na  -Lyte replacement per unit routine  -recommend thiazide diuretic today (metolazone vs diuril)  - recommend discontinue IVF   Infectious Disease Sepsis likely due to pneumonia. Sputum culture pending  Continue Zosyn.  MRSA Nares Negative: ok to discontinue vanco   Endocrinology: # Euglycemic DKA in setting of T2DM history  -Ketones normalized   -Continue on insulin gtt   ICU Checklist: CVC: present and needed  A-line: present and needed  Landrum: present and needed  DVT:  heparin gtt  GI ppx: PPI    Primary Team Communication: Communicated with bedside nurse   Billing: Total critical care time spent by me, excluding procedures, was 35 minutes.      Nathaniel Allen DO  3/26/2025           Hospital Course and Key events       The patient remains critically ill with Acute on chronic hypoxic and hypercarbic respiratory failure, Shock likely due to sepsis, and Acute Kidney Injury.     Current infusions reviewed:    Fentanyl 50 mcg/hr  Heparin 800 unit(s)/hr               Medications:     I have reviewed this patient's current medications  Current Facility-Administered Medications   Medication Dose Route Frequency Provider Last Rate Last Admin    amiodarone (NEXTERONE) 1.8 mg/mL in dextrose 5% 200 mL ADULT STANDARD infusion  0.5 mg/min Intravenous Continuous Bibi Cancino MD   Stopped at 03/26/25 2158    dextrose 10% infusion   Intravenous Continuous PRN Bibi Cancino MD        fentaNYL (SUBLIMAZE) infusion   mcg/hr Intravenous Continuous Gentry Saez DO   Stopped at 03/27/25 1105    heparin 25,000 units in 0.45% NaCl 250 mL ANTICOAGULANT infusion  0-5,000 Units/hr Intravenous Continuous Bibi Cancino MD 9.5 mL/hr at 03/27/25 0811 950 Units/hr at 03/27/25 0811    insulin regular (MYXREDLIN) 1 unit/mL infusion  0-24 Units/hr Intravenous Continuous Bibi Cancino MD 6 mL/hr at 03/27/25 1105 6 Units/hr at 03/27/25 1105    midazolam (VERSED) 100 mg/100 mL NS infusion - ADULT  0.5-8 mg/hr Intravenous Continuous Prosper Schulz MD   Stopped at 03/26/25 1428    No lozenges or gum should be given while patient on BIPAP/AVAPS/AVAPS AE   Does not apply Continuous PRN Bibi Cancino MD        norepinephrine (LEVOPHED) 4 mg in  mL infusion PREMIX  0.01-0.6 mcg/kg/min Intravenous Continuous Olga Gaytan MD   Stopped at 03/26/25 1957    Patient is already receiving anticoagulation with heparin, enoxaparin (LOVENOX), warfarin (COUMADIN)  or other  anticoagulant medication   Does not apply Continuous PRN Te Parish MD        sodium chloride 0.45% infusion   Intravenous Continuous Prosper Schulz MD 75 mL/hr at 03/27/25 0814 New Bag at 03/27/25 0814    vasopressin (VASOSTRICT) 20 Units in sodium chloride 0.9 % 100 mL standard conc infusion  2.4 Units/hr Intravenous Continuous Gentry Saez DO              Vitals and Exam:     Temp:  [96.8  F (36  C)-98.8  F (37.1  C)] 97.7  F (36.5  C)  Pulse:  [] 71  Resp:  [16-27] 16  BP: ()/(52-85) 117/67  MAP:  [64 mmHg-95 mmHg] 69 mmHg  Arterial Line BP: ()/(47-75) 110/52  FiO2 (%):  [55 %-65 %] 55 %  SpO2:  [89 %-97 %] 92 %    Intake/Output Summary (Last 24 hours) at 3/24/2025 1405  Last data filed at 3/24/2025 1347  Gross per 24 hour   Intake 4000.1 ml   Output 985 ml   Net 3015.1 ml     Net IO Since Admission: 3,468.7 mL [03/24/25 1405]      FiO2 (%): 55 %, Resp: 16, Inspiratory Pressure (cm H2O) (Drager Elaina): 30, Vent Mode: CPAP/PS, Resp Rate (Set): 22 breaths/min, PEEP (cm H2O): 8 cmH2O, Resp Rate (Set): 22 breaths/min, PEEP (cm H2O): 8 cmH2O    Physical Examination:   I examined this patient remotely using the telemedicine camera in the [facility]. The patient is located at [facility] ([place])    General Appearance:   Intubated, sedated   HEENT:   Endotrachael tube is present     Respiratory:   Good patient-ventilator synchrony     Neuro:   Sedation: minimally responsive            Pertinent Data:     All pertinent labs and diagnostic studies were reviewed    Labs:  CMP  Recent Labs   Lab 03/27/25  1207 03/27/25  1055 03/27/25  1014 03/27/25  0809 03/27/25  0502 03/27/25  0441 03/26/25  0606 03/26/25  0324 03/25/25  1615 03/25/25  1524 03/25/25  1015 03/25/25  0956 03/25/25  0501 03/25/25  0406 03/24/25  0503 03/24/25  0426 03/23/25  2218 03/23/25  2145 03/23/25  1856 03/23/25  1826 03/23/25  1500 03/23/25  1404 03/22/25  1905 03/22/25  1731    NA  --   --   --   --   --  148*  --  144  --   --   --   --   --  146*  --  144  --  142  --  143  --  141   < > 134*   POTASSIUM  --   --   --   --   --  3.8  --  3.8  --  3.6  --  3.4  --  3.2*  --  4.0  --  5.0  --  4.0  --  4.1   < > 4.5   CHLORIDE  --   --   --   --   --  117*  --  115*  --   --   --   --   --  114*  --  111*  --  106  --  107  --  103   < > 96*   CO2  --   --   --   --   --  20*  --  18*  --   --   --   --   --  17*  --  21*  --  19*  --  24  --  23   < > 21*   ANIONGAP  --   --   --   --   --  11  --  11  --   --   --   --   --  15  --  12  --  17*  --  12  --  15   < > 17*   * 169* 191* 141*   < > 123*   < > 123*   < >  --    < >  --    < > 162*   < > 111*   < > 219*   < > 157*   < > 248*   < > 460*   BUN  --   --   --   --   --  60.5*  --  55.1*  --   --   --   --   --  61.9*  --  61.9*  --  59.0*  --  56.6*  --  57.3*   < > 48.1*   CR  --   --   --   --   --  2.09*  --  1.91*  --   --   --   --   --  2.06*  --  1.91*  --  1.85*  --  1.91*  --  1.74*   < > 1.32*   GFRESTIMATED  --   --   --   --   --  26*  --  29*  --   --   --   --   --  27*  --  29*  --  30*  --  29*  --  33*   < > 46*   NORM  --   --   --   --   --  8.6*  --  8.4*  --   --   --   --   --  8.2*  --  8.3*  --  8.2*  --  7.9*  --  8.9   < > 9.0   MAG  --   --   --   --   --   --   --   --   --   --   --   --   --  2.2  --   --   --  2.2  --   --   --   --   --  2.0   PHOS  --   --   --   --   --   --   --   --   --   --   --   --   --  3.0  --   --   --  5.3*  --  5.9*  --  5.7*   < >  --    PROTTOTAL  --   --   --   --   --  5.2*  --  5.5*  --   --   --   --   --  5.8*  --  5.8*  --   --   --   --   --   --   --  6.6   ALBUMIN  --   --   --   --   --  1.7*  --  1.8*  --   --   --   --   --  2.0*  --  1.9*  --   --   --   --   --   --   --  2.6*   BILITOTAL  --   --   --   --   --  0.3  --  0.2  --   --   --   --   --  0.2  --  0.3  --   --   --   --   --   --   --  0.6   ALKPHOS  --   --   --   --   --  83  --  88   --   --   --   --   --  99  --  120  --   --   --   --   --   --   --  111   AST  --   --   --   --   --  44  --  54*  --   --   --   --   --  50*  --  31  --   --   --   --   --   --   --  21   ALT  --   --   --   --   --  23  --  23  --   --   --   --   --  20  --  16  --   --   --   --   --   --   --  10    < > = values in this interval not displayed.     CBC  Recent Labs   Lab 03/27/25  0441 03/26/25  0324 03/25/25  0406 03/24/25  1432   WBC 17.0* 22.1* 19.5* 20.5*   RBC 4.70 4.80 4.87 5.09   HGB 14.1 14.6 14.9 15.7   HCT 44.6 45.6 45.8 48.7*   MCV 95 95 94 96   MCH 30.0 30.4 30.6 30.8   MCHC 31.6 32.0 32.5 32.2   RDW 15.2* 15.4* 14.9 14.8    252 302 346     INR  Recent Labs   Lab 03/23/25  1826   INR 1.29*     Arterial Blood Gas  Recent Labs   Lab 03/27/25  0729 03/26/25  0319 03/25/25  0330 03/24/25  0445   PH 7.39 7.37 7.33* 7.34*   PCO2 34* 34* 38 41   PO2 56* 79* 89 85   HCO3 21 20* 20* 22   O2PER 55 75 60 70     Lactic Acid   Date Value Ref Range Status   03/24/2025 1.5 0.7 - 2.0 mmol/L Final       Imaging:    TTE 3/24: LVEF 65-70%, normal RV size and function.  No significant valve pathology.

## 2025-03-27 NOTE — PHARMACY-VANCOMYCIN DOSING SERVICE
"Pharmacy Vancomycin Note  Date of Service 2025  Patient's  1965   59 year old, female    Indication: Sepsis  Day of Therapy: 4  Current vancomycin regimen:  1000 mg IV q24h  Current vancomycin monitoring method: AUC  Current vancomycin therapeutic monitoring goal: 400-600 mg*h/L    [employer] Prediction of Current Vancomycin Regimen  Loading dose: N/A  Regimen: 1000 mg IV every 24 hours.  Start time: 12:52 on 2025  Exposure target: AUC24 (range)400-600 mg/L.hr   AUC24,ss: 553 mg/L.hr  Probability of AUC24 > 400: 99 %  Ctrough,ss: 18.2 mg/L  Probability of Ctrough,ss > 20: 32 %  Probability of nephrotoxicity (Lodise GEORGE ): 14 %      Current estimated CrCl = Estimated Creatinine Clearance: 34.5 mL/min (A) (based on SCr of 2.09 mg/dL (H)).    Creatinine for last 3 days  3/25/2025:  4:06 AM Creatinine 2.06 mg/dL  3/26/2025:  3:24 AM Creatinine 1.91 mg/dL  3/27/2025:  4:41 AM Creatinine 2.09 mg/dL    Recent Vancomycin Levels (past 3 days)  3/26/2025:  3:24 AM Vancomycin 18.0 ug/mL  3/27/2025: 10:11 AM Vancomycin 17.1 ug/mL    Vancomycin IV Administrations (past 72 hours)                     vancomycin (VANCOCIN) 1,000 mg in 200 mL dextrose intermittent infusion (mg) 1,000 mg New Bag 25 1252    vancomycin (VANCOCIN) 1,250 mg in 0.9% NaCl 250 mL intermittent infusion (mg) 1,250 mg New Bag 25 1235    vancomycin (VANCOCIN) 1,500 mg in 0.9% NaCl 250 mL intermittent infusion (mg) 1,500 mg New Bag 25 1314                    Nephrotoxins and other renal medications (From now, onward)      Start     Dose/Rate Route Frequency Ordered Stop    25 1230  vancomycin (VANCOCIN) 1,000 mg in 200 mL dextrose intermittent infusion        Placed in \"Followed by\" Linked Group    1,000 mg  200 mL/hr over 1 Hours Intravenous EVERY 24 HOURS 25 0854      25 1900  vasopressin (VASOSTRICT) 20 Units in sodium chloride 0.9 % 100 mL standard conc infusion         2.4 Units/hr  12 mL/hr  " "Intravenous CONTINUOUS 03/23/25 1858      03/23/25 1730  norepinephrine (LEVOPHED) 4 mg in  mL infusion PREMIX         0.01-0.6 mcg/kg/min × 90.4 kg  3.4-203.4 mL/hr  Intravenous CONTINUOUS 03/23/25 1728      03/23/25 1000  piperacillin-tazobactam (ZOSYN) 3.375 g vial to attach to  mL bag        Note to Pharmacy: For [facility], [facility] and [facility]: For Zosyn-naive patients, use the \"Zosyn initial dose + extended infusion\" order panel.    3.375 g  over 30 Minutes Intravenous EVERY 6 HOURS 03/23/25 0907      03/23/25 0900  [Held by provider]  empagliflozin (JARDIANCE) tablet 25 mg        (On hold since Mon 3/24/2025 at 1355 until manually unheld; held by Bibi Cancino MDHold Reason: NPO)   Note to Pharmacy: PTA Sig:Take 1 tablet (25 mg) by mouth daily      25 mg Oral DAILY 03/23/25 0632                 Contrast Orders - past 72 hours (72h ago, onward)      None            Interpretation of levels and current regimen:  Vancomycin level is reflective of -600    Has serum creatinine changed greater than 50% in last 72 hours: No    Urine output:  diminished urine output    Renal Function: Worsening      Plan:  Continue Current Dose  Vancomycin monitoring method: AUC  Vancomycin therapeutic monitoring goal: 400-600 mg*h/L  Pharmacy will check vancomycin levels as appropriate in 1-3 Days.  Serum creatinine levels will be ordered daily for the first week of therapy and at least twice weekly for subsequent weeks.    Renea Wallace [facility]    "

## 2025-03-27 NOTE — PROVIDER NOTIFICATION
Tele-ICU notified of patient converting to NSR 70s at 1857.    Verbal order with readback given for an EKG to confirm.

## 2025-03-27 NOTE — PROGRESS NOTES
[facility]    Hospitalist Progress Note    59 years old female with a past medical history of hypertension, diabetes mellitus type 2, pulmonary emphysema, intellectual disability with memory issues, hyperparathyroidism and multiple other medical issues was brought to the emergency room for high blood sugars with shortness of breath. Complains of pain generalized symptoms in the chest but difficult to clarify. Not a good historian. Most of the history I also has been obtained from chart/caregivers. In the ED was noted to be tachycardic in the 130s and a subsequent EKG was concerning for SVT. Repeat EKG had shown A-fib with rapid ventricular response. COVID influenza and RSV is negative. CBC showed a white count of 16.4 with a hemoglobin of 16.8 and a platelet count of 260. CMP showed sodium 134 potassium 4.5 BUN of 48.1 with a creatinine of 1.32. AST/ALT was 21/10. VBG showed a pH of 1.39 with a pCO2 of 38 and a bicarb of 23. Lactic acid is 1.8. Ketones was 1.89. Follow-up CT chest shows a small pulmonary artery embolism to the subsegmental branches of the left lower lobe with no right heart strain. Patient was initiated on therapeutic Lovenox in addition to insulin drip for possible diabetic ketoacidosis. Cardizem infusion was initiated for SVT/A-fib. She was admitted for further evaluation.         Assessment & Plan  Marly Cannon is a 59 year old female who was admitted on 3/22/2025.      Acute hypoxic/hypercapnic respiratory failure: Patient intubated afternoon of 3/23.  Etiology uncertain, however possibility includes PE, aspiration, pneumonia, versus bad COPD.  CTA chest on admission showed clear lungs, small subsegmental PE.  Placed on therapeutic Lovenox at that time.  Patient is subsequently required BiPAP, then intubation over the course of 8 to 10 hours.  Serial chest x-rays have been stable/clear.  Worst ABG showed pH of 7.05 with pCO2 103, suggesting retention.COPD exacerbation?  -3/24:  Intubated, sedated on 3/23.  Ventilator settings with FiO2 of 70%, PEEP of 5.  Latest ABG pH 7.34, pCO2 41, pO2 of 85..  Repeat CTA chest shows progression of right lower lobe pulmonary embolism, now involving the segmental as well as a subsegmental arteries.  There is collapse of the entire right lower lobe with opacification of bronchi within the right lower lobe, possible mucous plugging from aspiration of contents?  Continuing Zosyn  -3/25: Remains intubated, sedated.  Ventilator settings with FiO2 at 60%, PEEP of 5.  ABG with pH of 7.33.  Chest x-ray stable, persistent collapsed middle lobe.  On Zosyn, possible aspiration versus mucous plug.  Chest physiotherapy ordered by telemetry ICU.  -3/26: Patient remains on the ventilator.  ABG with pH 7.37 pCO2 34 pO2 79 bicarb 20.  Sats been running 96+ percent.  She is on 80% FiO2.  Repeat chest x-ray no significant changes.  Still has what appears to be right lower lobe collapse.  Clear lungs on exam.  He is getting significant amount of IV fluid over the last 24 hours of most 5 L.  No significant wheezing.  Will try and titrate down on the FiO2.  Somewhat surprising requires so much oxygen at this point.  His lung fields specially left lung is relatively clear.  No significant secretions.  Sedated with the Versed and fentanyl.  Continue with aggressive pulmonary toilet.  Wean FiO2.  Is on antibiotics for potential aspiration.  Getting IV steroids every 6 hours will cut back on the dosing of this also.  -3/27: Patient remains intubated.  Her oxygen requirements have been decreased down to 55% FiO2.  Arterial blood gas has a pH of 7.39 pCO2 34 pO2 56.  Chest x-ray still shows to the right lower lobe probable collapse.  Some mild pleural effusions bilaterally.  He is only on fentanyl currently for sedation.  Treating for pneumonia and her pulmonary embolism.  Chronically is on 2 L of O2.     Suspected pneumonia: initial CTA chest shows clear lungs, however patient  tachypneic, increased work of breathing.  Oxygen requirement likely due to COPD.  Repeat CTA findings as above.  -Continuing Zosyn  -Continue to monitor respiratory status on ventilator  -3/26: Currently is on Zosyn/vancomycin.  MRSA/SA swab negative.  White count is 22,000.  No major fevers.  Blood cultures remain negative.  Minimal secretions.     Shock: Uncertain etiology.  Will need to rule out obstructive shock with CTA.  Patient is now on moderate amount of Levophed.  She has been adequately volume resuscitated per sepsis protocol.  -3/24: Maintenance fluids.  Art line inserted, will titrate pressors to MAP of 60.  Echocardiogram ordered.  WBC 15.5, Tmax 99.7 in last 24 hours.  Urinalysis negative for significant pyuria.  Initial CTA on admission showed clear lungs.  CTA without massive saddle embolus, CT abdomen pelvis with dilated gallbladder, but no biliary dilatation, no obvious obstruction.  No thickened gallbladder wall, no fluid collection.  CT also shows subcutaneous edema in the lower anterior abdominal wall and into the right labia.  Possible cellulitic infection?  Will continue IV Zosyn.  Adding vancomycin  -3/25: Patient with persistent vasopressor requirement, 0.08 Levophed.  Turned up periodically to 0.11.  Possible source of infections include aspiration pneumonia, cellulitis of lower abdominal wall/labia, gallbladder.  LFTs have remained unremarkable.  Continuing Zosyn/vancomycin  -3/26: Blood pressures have been greater than 100 systolic.  He is on 0.05 of Levophed.  Will continue to titrate this down his tolerated.  Has received significant IV fluids at this point.  Urine output has been on the somewhat oliguric side.  Echocardiogram was a very technically difficult study the LV function was normal however.  Right ventricle did not appear to be grossly dilated.  -3/27: Patient had what appeared to be started a supraventricular rhythm throughout yesterday.  She reverted back to sinus rhythm  around 8 PM at that time her pressures did come up significantly.  She is no longer requiring any pressors.     Encephalopathy: Toxic versus metabolic.  Patient is intubated and sedated.  -3/24: CT of the head negative for acute pathology.  -3/26: Patient sedated at this point difficult to really assess her mental status.     Pulmonary embolism: Subsegmental with no right heart strain at least on CT.  Patient given therapeutic Lovenox in the emergency room.  -Echocardiogram ordered  -3/24: Currently on Lovenox twice daily.  Given worsening renal function, will switch her to heparin  -3/25: Continuing on heparin gtt  -3/26: PE on heparin drip.  -3/27: Continues on the heparin infusion.     CVS:  Patient is initial EKG with A-fib RVR with heart rate in the 150s.  Patient received diltiazem, now she is converted.  She was on p.o. diltiazem 60 mg every 8 hours scheduled, as well as a diltiazem drip.  This was stopped due to hypotension.  Patient has also had self-limited runs of SVT.  -3/24: Shock requiring pressors.  Patient currently normal sinus rhythm but rate is tachy at 120.  Echocardiogram is ordered.  Given hypotension, cannot use calcium channel blocker or beta-blocker.  EKG shows SVT versus aflutter.  Will start her on amiodarone drip.  Continuing anticoagulation with heparin gtt  -3/25: Patient remains on Levophed, amiodarone.  Heart rate better controlled in the 1 teens to 120s, but still appears to be SVT/aflutter.  Continues on heparin drip as well.  Source of shock uncertain.  Echocardiogram official read with ejection fraction of 65 to 70%, hyperdynamic LV, no significant valvular or structural abnormalities.  -3/26: Heart rate rate around 115.  Is regular.  Cannot really tell on telemetry if this is not a flutter or sinus tachycardia will obtain twelve-lead EKG.  He is on amiodarone as apparently did have SVT and A-fib earlier.  -3/27: Patient did revert to sinus rhythm last night heart rates have been  now in the 60-70 range.  Amiodarone was discontinued.     Uncontrolled diabetes mellitus type 2, insulin-dependent: This is despite insulin pump.  Patient had functioning insulin pump on 3/20/2025 when she was discharged from this facility.  Anion gap is 17, but blood glucoses have been in the 200s.  Serum ketones resolved for a time, but now have returned.  Ketones uptrending  -3/23: Insulin pump removed.  Was given long-acting insulin 20 units of Toujeo this a.m., ketones continuing to uptrend.  Will place her back on insulin drip at this time.  -3/24: Patient on insulin drip.  Shutting off periodically for euglycemia.  -3/25: Patient intermittently on insulin drip.  Bicarb 17, anion gap closed at 15.  Holding subcu insulin in favor of insulin drip at this time  -3/26: Is on the insulin infusion at this point.  Sugars are appropriate.  He is getting tube feedings at goal rate.  -3/27: Remains on insulin infusion sugars have been fine 120 range.     YADIRA: Creatinine rising since admission, now 1.64.  Previously slightly above 1, on presentation was 1.3.  Patient did receive contrast.  -3/24: Creatinine 1.91.  Maintaining IV fluids.  Monitoring renal function and urine output  -3/25: Creatinine climbing to 2.06 despite IV fluids.  Patient did receive IV contrast, possible contrast nephropathy.  Urine output has been adequate.  -3/26: Somewhat oliguric.  Urine output only 1 L over the last 24 hours.  A total of 5163 cc in.  BUN/creatinine are slightly improved to 55/1.91.  Potassium 3.8 sodium is 144.  Minimal lower extremity edema.  At some point may require at least some Lasix.  -3/27: Still somewhat oliguric.  Creatinine has jumped up somewhat today to 2.09 BUN 60.5 sodium 148.  Does need increasing free water.  Will administer this via her gastric tube.  Also change to half-normal saline.  Landrum catheter is in place.  Repeat UA urine sodium and creatinine.     COPD: Oxygen dependence established last admission.   Patient was discharged on 2 L nasal cannula after last admission.     FEN: Diabetic diet as tolerated.           Diet: NPO for Medical/Clinical Reasons Except for: No Exceptions  Adult Formula Drip Feeding: Specified Time Osmolite 1.5; Orogastric tube; Goal Rate: 60; mL/hr; Keep at 10ml/hr for 24 hours. After 24 hours advance by 5ml/hr Q8H.  Fluids: Half-normal saline 75 cc an hour    DVT Prophylaxis: Heparin infusion  Code Status: Full Code    Disposition: Expected discharge in 5-7 days once extubated.    Prosper Schulz MD    Interval History   Overnight patient did convert back to sinus rhythm.  With immediate improvement in blood pressure.  Amiodarone was discontinued.  Levophed is off.  Still getting a fair amount of IV fluid given all of her drips.  Renal function she looks almost little prerenal but this might be just due to her previous shock she had.  Needs free water serum sodium is elevated we will give it via her gastric tube and IV.  Will lighten her up and try some weaning parameters and see how she is doing.  Continue with current antibiotic    -Data reviewed today: I reviewed all new labs and imaging results over the last 24 hours. I personally reviewed the chest x-ray image(s) showing no significant changes .    Physical Exam   Temp: 97  F (36.1  C) Temp src: Tympanic BP: 111/72 Pulse: 61   Resp: 22 SpO2: 95 % O2 Device: Mechanical Ventilator    Vitals:    03/25/25 0409 03/26/25 0622 03/27/25 0441   Weight: 97 kg (213 lb 13.5 oz) 102.1 kg (225 lb 1.4 oz) 107.8 kg (237 lb 10.5 oz)     Vital Signs with Ranges  Temp:  [96.8  F (36  C)-98.8  F (37.1  C)] 97  F (36.1  C)  Pulse:  [] 61  Resp:  [16-23] 22  BP: ()/(52-85) 111/72  MAP:  [64 mmHg-95 mmHg] 76 mmHg  Arterial Line BP: ()/(50-75) 114/57  FiO2 (%):  [55 %-80 %] 55 %  SpO2:  [89 %-97 %] 95 %  I/O last 3 completed shifts:  In: 5289.6 [I.V.:8067.6; NG/GT:290]  Out: 819 [Urine:819]    Peripheral IV 03/25/25 Anterior;Left Lower  forearm (Active)   Site Assessment [facility] 03/27/25 0600   Line Status Saline locked 03/27/25 0600   Dressing Transparent 03/27/25 0600   Dressing Status clean;dry;intact 03/27/25 0000   Line Intervention Flushed 03/27/25 0400   Line Necessity Yes, meets criteria 03/26/25 1830   Phlebitis Scale 0-->no symptoms 03/27/25 0600   Infiltration? no 03/27/25 0600   Number of days: 2       Arterial Line 03/23/25 Radial (Active)   Site Assessment [facility] 03/27/25 0600   Line Status Pulsatile blood flow 03/27/25 0600   Art Line Waveform Appropriate 03/27/25 0600   Art Line Interventions Zeroed and calibrated;Leveled;Connections checked and tightened;Flushed per protocol 03/27/25 0400   Color/Movement/Sensation Capillary refill less than 3 sec 03/27/25 0600   Line Necessity Yes, meets criteria 03/27/25 0600   Dressing Type Transparent 03/27/25 0600   Dressing Status Clean, dry, intact 03/27/25 0600   Dressing Intervention Dressing changed/new dressing 03/24/25 1615   Number of days: 4       CVC Triple Lumen Right Internal jugular (Active)   Site Assessment [facility] 03/27/25 0600   Dressing Chlorhexidine disk;Transparent 03/27/25 0600   Dressing Status clean;dry;intact 03/27/25 0400   Dressing Intervention dressing changed 03/26/25 0600   Line Necessity Yes, meets criteria 03/27/25 0600   Blue - Status saline locked 03/27/25 0640   Blue - Intervention Flushed 03/27/25 0640   Brown - Status infusing 03/27/25 0600   Brown - Intervention Flushed 03/27/25 0300   White - Status infusing 03/27/25 0600   White - Intervention Flushed 03/25/25 0800   Phlebitis Scale 0-->no symptoms 03/27/25 0600   Infiltration? no 03/27/25 0600   Number of days: 4       ETT Cuffed 7.5 mm (Active)   Secured at (cm) 22 cm 03/27/25 0400   Measured from Teeth/Gums 03/27/25 0400   Position Right 03/27/25 0351   Secured by Commercial tube haque 03/27/25 0400   Bite Block Included with Commercial tube haque 03/27/25 0400   Site Appearance Clean;Dry 03/27/25 0400   Tube  Care Site care done 03/26/25 1616   Cuff Assessment Minimal occluding volume 03/26/25 1519   Cuff Pressure - cm H2O 26 cmH20 03/26/25 1519   Safety Measures Manual resuscitator at bedside 03/27/25 0351   Number of days: 4       GI Enteral Access Device Mouth, center Gastric 18 fr (Active)   Status Feeding 03/27/25 0351   Placement Confirmation [place] unchanged 03/27/25 0351   Surrounding Skin Assessment WDL 03/27/25 0351   Insertion Site Assessment WDL 03/27/25 0351   [place] (cm marking) at nare/mouth 57 cm 03/27/25 0351   [place] (visual) [place] at entry site (nare, mouth, etc.) 03/27/25 0351   Securement Device Tape 03/27/25 0351   Drainage Tan 03/27/25 0351   Intake (mL) 38 ml 03/25/25 1056   Flush/Free Water (mL) 30 mL 03/27/25 0400   Residual (mL) 150 mL 03/27/25 0400   Output (mL) 500 ml 03/25/25 0600   Number of days: 4       Urinary Drain 03/23/25 Urethral Catheter (Active)   Tube Description Positional 03/27/25 0351   Catheter Care Catheter wipes 03/27/25 0351   Perineal Skin Assessment Edema;Erythema;Excoriated 03/27/25 0351   Collection Container Standard 03/27/25 0351   Securement Method Commercial securement device 03/27/25 0351   Rationale for Continued Use ICU only: hourly urine output needed for patient care 03/27/25 0351   Urine Output 65 mL 03/27/25 0611   Number of days: 4     Right IJ central line in place secured left radial art line secured site looks okay we will continue with this for 24 hours    Constitutional: Sedated      HEENT: Normocephalic/atraumatic, PERRL, EOMI, mouth oral gastric and ET tube, neck supple, no LN. central line right IJ    Cardiovascular: RRR.  No   murmur, no  rubs, or gallops.  JVD unable.  .  Pulses 2    Respiratory: Difficult exam clear anteriorly.  Clear to auscultation bilaterally    Abdomen: Soft, nontender, nondistended, no organomegaly. Bowel sounds present    Extremities: Warm/dry.  Diffuse 1 maybe 2+ edema    Neuro: difficult exam is sedated.  Non  focal, cranial nerves intact, Moves all extremities.  Medications   Current Facility-Administered Medications   Medication Dose Route Frequency Provider Last Rate Last Admin    amiodarone (NEXTERONE) 1.8 mg/mL in dextrose 5% 200 mL ADULT STANDARD infusion  0.5 mg/min Intravenous Continuous Bibi Cancino MD   Stopped at 03/26/25 2158    dextrose 10% infusion   Intravenous Continuous PRN Bibi Cancino MD        dextrose 10% infusion   Intravenous Continuous PRN Bibi Cancino MD        fentaNYL (SUBLIMAZE) infusion   mcg/hr Intravenous Continuous Gentry Saez DO 1 mL/hr at 03/27/25 0210 50 mcg/hr at 03/27/25 0210    heparin 25,000 units in 0.45% NaCl 250 mL ANTICOAGULANT infusion  0-5,000 Units/hr Intravenous Continuous Bibi Cancino MD 9.5 mL/hr at 03/27/25 0608 950 Units/hr at 03/27/25 0608    insulin regular (MYXREDLIN) 1 unit/mL infusion  0-24 Units/hr Intravenous Continuous Bibi Cancino MD 2 mL/hr at 03/27/25 0602 2 Units/hr at 03/27/25 0602    lactated ringers infusion   Intravenous Continuous Mayte Manjarrez MD 75 mL/hr at 03/27/25 0605 New Bag at 03/27/25 0605    midazolam (VERSED) 100 mg/100 mL NS infusion - ADULT  0.5-8 mg/hr Intravenous Continuous Prosper Schulz MD   Stopped at 03/26/25 1428    No lozenges or gum should be given while patient on BIPAP/AVAPS/AVAPS AE   Does not apply Continuous PRN Bibi Canicno MD        norepinephrine (LEVOPHED) 4 mg in  mL infusion PREMIX  0.01-0.6 mcg/kg/min Intravenous Continuous Olga Gaytan MD   Stopped at 03/26/25 1957    Patient is already receiving anticoagulation with heparin, enoxaparin (LOVENOX), warfarin (COUMADIN)  or other anticoagulant medication   Does not apply Continuous PRN Te Parish MD        vasopressin (VASOSTRICT) 20 Units in sodium chloride 0.9 % 100 mL standard conc infusion  2.4 Units/hr Intravenous Continuous Gentry Saez P, DO         Current Facility-Administered Medications    Medication Dose Route Frequency Provider Last Rate Last Admin    [Held by provider] aspirin (ASA) chewable tablet 81 mg  81 mg Oral Daily Te Parish MD   81 mg at 03/24/25 0948    [Held by provider] atorvastatin (LIPITOR) tablet 40 mg  40 mg Oral At Bedtime Te Parish MD   40 mg at 03/23/25 2301    chlorhexidine (PERIDEX) 0.12 % solution 15 mL  15 mL Mouth/Throat Q12H Gentry Saez DO   15 mL at 03/26/25 2023    [Held by provider] diltiazem (CARDIZEM) tablet 60 mg  60 mg Oral Q8H [facility] Te Parish MD   60 mg at 03/23/25 0514    [Held by provider] empagliflozin (JARDIANCE) tablet 25 mg  25 mg Oral Daily Te Parish MD   25 mg at 03/24/25 0948    [Held by provider] enoxaparin ANTICOAGULANT (LOVENOX) injection 70 mg  0.75 mg/kg Subcutaneous Q12H Te Parish MD   70 mg at 03/24/25 0956    fluconazole (DIFLUCAN) intermittent infusion 200 mg  200 mg Intravenous Q24H Bibi Cancino  mL/hr at 03/26/25 1857 200 mg at 03/26/25 1857    [Held by provider] insulin glargine U-300 (TOUJEO) injection 20 Units  20 Units Subcutaneous QA Te Parish MD   20 Units at 03/23/25 0911    ipratropium - albuterol 0.5 mg/2.5 mg/3 mL (DUONEB) neb solution 3 mL  3 mL Nebulization 4x daily Delmy Harris MD   3 mL at 03/26/25 1922    methylPREDNISolone Na Suc (solu-MEDROL) injection 62.5 mg  62.5 mg Intravenous Q12H Prosper Schulz MD   62.5 mg at 03/26/25 2007    pantoprazole (PROTONIX) 2 mg/mL suspension 40 mg  40 mg Per Feeding Tube QAM Gentry Peng DO        Or    pantoprazole (PROTONIX) IV push injection 40 mg  40 mg Intravenous QAM Gentry Peng, DO   40 mg at 03/27/25 0602    piperacillin-tazobactam (ZOSYN) 3.375 g vial to attach to  mL bag  3.375 g Intravenous Q6H Bibi Cancino MD   3.375 g at 03/27/25 0602    primidone (MYSOLINE) tablet 50 mg  50 mg Oral or Feeding Tube At Bedtime Bibi Cancino MD   50 mg at  03/26/25 2212    vancomycin (VANCOCIN) 1,000 mg in 200 mL dextrose intermittent infusion  1,000 mg Intravenous Q24H Prosper Schulz  mL/hr at 03/26/25 1252 1,000 mg at 03/26/25 1252    Vitamin D3 (CHOLECALCIFEROL) tablet 25 mcg  25 mcg Oral or Feeding Tube Daily Bibi Cancino MD   25 mcg at 03/26/25 0842       Data   Recent Labs   Lab 03/27/25  0600 03/27/25  0502 03/27/25  0441 03/26/25  0606 03/26/25  0324 03/25/25  1615 03/25/25  1524 03/25/25  0501 03/25/25  0406 03/23/25  1856 03/23/25  1826   WBC  --   --  17.0*  --  22.1*  --   --   --  19.5*   < >  --    HGB  --   --  14.1  --  14.6  --   --   --  14.9   < >  --    MCV  --   --  95  --  95  --   --   --  94   < >  --    PLT  --   --  190  --  252  --   --   --  302   < >  --    INR  --   --   --   --   --   --   --   --   --   --  1.29*   NA  --   --  148*  --  144  --   --   --  146*   < > 143   POTASSIUM  --   --  3.8  --  3.8  --  3.6   < > 3.2*   < > 4.0   CHLORIDE  --   --  117*  --  115*  --   --   --  114*   < > 107   CO2  --   --  20*  --  18*  --   --   --  17*   < > 24   BUN  --   --  60.5*  --  55.1*  --   --   --  61.9*   < > 56.6*   CR  --   --  2.09*  --  1.91*  --   --   --  2.06*   < > 1.91*   ANIONGAP  --   --  11  --  11  --   --   --  15   < > 12   NORM  --   --  8.6*  --  8.4*  --   --   --  8.2*   < > 7.9*   * 141* 123*   < > 123*   < >  --    < > 162*   < > 157*   ALBUMIN  --   --  1.7*  --  1.8*  --   --   --  2.0*   < >  --    PROTTOTAL  --   --  5.2*  --  5.5*  --   --   --  5.8*   < >  --    BILITOTAL  --   --  0.3  --  0.2  --   --   --  0.2   < >  --    ALKPHOS  --   --  83  --  88  --   --   --  99   < >  --    ALT  --   --  23  --  23  --   --   --  20   < >  --    AST  --   --  44  --  54*  --   --   --  50*   < >  --     < > = values in this interval not displayed.       Recent Results (from the past 24 hours)   XR Chest Port 1 View    Narrative    EXAM: XR CHEST PORT 1 VIEW  LOCATION: [facility]  DATE: 3/27/2025    INDICATION: intubated  COMPARISON: 03/26/2025      Impression    IMPRESSION: Endotracheal tube 5.2 cm above carlos. Right IJ line. Enteric tube in stomach. Bibasilar atelectasis or infiltrates and pleural effusions.

## 2025-03-27 NOTE — PLAN OF CARE
Goal Outcome Evaluation:       Plan of Care Reviewed With: patient    Overall Patient Progress: progressing    Patient remains sedated and intubated. Vent settings unchanged. Fentanyl titrated to maintain ordered RASS goal. VSS, afebrile. CPOT 0-3. Levophed weaned off this shift. Amiodarone held due to bradycardia, see provider notification note. Heparin gtt continues. LS clear. Scant secretions with oral and inline suctioning. HRR. NSR 60s, bradycardic in the 50s at times with occ. PACs. Tube feedings continue, residuals as charted. BS hypoactive. Insulin gtt titrated per protocol, see MAR. Landrum catheter with marginal output of cloudy, yellow urine. Edema and excoriation to perineum. Generalized edema remains. ART line WDL. CVC dressing CDI, brown and white lumens infusing, blue lumen SL with blood return noted. PIV SL x1. Repositioned and oral cares completed q2H.     Face to face report given with opportunity to observe patient.    Report given to KHADIJAH Dumont RN   3/27/2025  7:23 AM

## 2025-03-27 NOTE — PHARMACY-MEDICATION REGIMEN REVIEW
Pharmacy Antimicrobial Stewardship Assessment     Current Antimicrobial Therapy:                  Culture Results:         Recommendations/Interventions:  1. Discontinue vancomycin as MRSA nares negative and gram positive cocci in sputum only 2+, and sepsis is a presumed pulmonary source.  2. Continue fluconazole for 7 days total assuming improvement of symptoms.  3. Consider drawing procalcitonin to help guide antibiotics de-escalation.    Renea Wallace, [facility]  March 27, 2025

## 2025-03-27 NOTE — PROGRESS NOTES
SBT done for 4 hours. CPAP 8, Pressure support 8 Fio2 .55. RR 16-18, -480 ml.Spo2 >92%. Tolerated well. Placed back on A/C 20, Vt450 Peep 8 per MD

## 2025-03-27 NOTE — PROVIDER NOTIFICATION
Tele-ICU notified of patient's heart rate dipping to the mid 50s. Order given to hold amio gtt until HR stays above 60.

## 2025-03-27 NOTE — PROGRESS NOTES
Patient remains on ventilator with settings at:  Mode: A/C  RR: 20  VT: 450  FIO2: 55  PEEP: 8  ET tube secured at: 22 Size: 7.5  Cuff checked: Yes miimal seal  Breath sounds: diminished clear  SpO2: >92%  Suction: scant amount of white  Weaning trial attempted: Yes   Ambu bag present: Yes

## 2025-03-27 NOTE — PLAN OF CARE
Right wrist, Left wrist, and soft restraints restraints continued 3/27/2025    Clinical Justification: Pulling lines, pulling tubes, and pulling equipment  Less Restrictive Alternative: Repositioning, Re-evaluate equipment, Disguise equipment, Pain management, Alarm  Attending Provider Notified: Yes, Attending Provider's Name: Dr. Schulz   New orders placed Yes  Length of Order: 1 Day      Malka Brewer RN

## 2025-03-28 ENCOUNTER — APPOINTMENT (OUTPATIENT)
Dept: GENERAL RADIOLOGY | Facility: HOSPITAL | Age: 60
DRG: 870 | End: 2025-03-28
Attending: INTERNAL MEDICINE
Payer: COMMERCIAL

## 2025-03-28 ENCOUNTER — TELEPHONE (OUTPATIENT)
Dept: FAMILY MEDICINE | Facility: OTHER | Age: 60
End: 2025-03-28

## 2025-03-28 VITALS
WEIGHT: 237.66 LBS | OXYGEN SATURATION: 93 % | SYSTOLIC BLOOD PRESSURE: 112 MMHG | RESPIRATION RATE: 20 BRPM | TEMPERATURE: 97.5 F | BODY MASS INDEX: 39.6 KG/M2 | HEIGHT: 65 IN | HEART RATE: 73 BPM | DIASTOLIC BLOOD PRESSURE: 59 MMHG

## 2025-03-28 DIAGNOSIS — Z79.4 TYPE 2 DIABETES MELLITUS WITH HYPERGLYCEMIA, WITH LONG-TERM CURRENT USE OF INSULIN (H): Primary | ICD-10-CM

## 2025-03-28 DIAGNOSIS — E11.65 TYPE 2 DIABETES MELLITUS WITH HYPERGLYCEMIA, WITH LONG-TERM CURRENT USE OF INSULIN (H): Primary | ICD-10-CM

## 2025-03-28 LAB
ALBUMIN SERPL BCG-MCNC: 1.7 G/DL (ref 3.5–5.2)
ALLEN'S TEST: ABNORMAL
ALP SERPL-CCNC: 79 U/L (ref 40–150)
ALT SERPL W P-5'-P-CCNC: 19 U/L (ref 0–50)
ANION GAP SERPL CALCULATED.3IONS-SCNC: 9 MMOL/L (ref 7–15)
AST SERPL W P-5'-P-CCNC: 29 U/L (ref 0–45)
BACTERIA BLD CULT: NO GROWTH
BACTERIA BLD CULT: NO GROWTH
BASE EXCESS BLDA CALC-SCNC: -4.4 MMOL/L (ref -3–3)
BILIRUB SERPL-MCNC: 0.3 MG/DL
BUN SERPL-MCNC: 59.1 MG/DL (ref 8–23)
CALCIUM SERPL-MCNC: 8.1 MG/DL (ref 8.8–10.4)
CHLORIDE SERPL-SCNC: 112 MMOL/L (ref 98–107)
CREAT SERPL-MCNC: 1.95 MG/DL (ref 0.51–0.95)
EGFRCR SERPLBLD CKD-EPI 2021: 29 ML/MIN/1.73M2
ERYTHROCYTE [DISTWIDTH] IN BLOOD BY AUTOMATED COUNT: 15.2 % (ref 10–15)
GLUCOSE BLDC GLUCOMTR-MCNC: 118 MG/DL (ref 70–99)
GLUCOSE BLDC GLUCOMTR-MCNC: 138 MG/DL (ref 70–99)
GLUCOSE BLDC GLUCOMTR-MCNC: 138 MG/DL (ref 70–99)
GLUCOSE BLDC GLUCOMTR-MCNC: 143 MG/DL (ref 70–99)
GLUCOSE BLDC GLUCOMTR-MCNC: 143 MG/DL (ref 70–99)
GLUCOSE BLDC GLUCOMTR-MCNC: 144 MG/DL (ref 70–99)
GLUCOSE BLDC GLUCOMTR-MCNC: 144 MG/DL (ref 70–99)
GLUCOSE BLDC GLUCOMTR-MCNC: 147 MG/DL (ref 70–99)
GLUCOSE BLDC GLUCOMTR-MCNC: 150 MG/DL (ref 70–99)
GLUCOSE BLDC GLUCOMTR-MCNC: 153 MG/DL (ref 70–99)
GLUCOSE BLDC GLUCOMTR-MCNC: 158 MG/DL (ref 70–99)
GLUCOSE BLDC GLUCOMTR-MCNC: 160 MG/DL (ref 70–99)
GLUCOSE BLDC GLUCOMTR-MCNC: 161 MG/DL (ref 70–99)
GLUCOSE BLDC GLUCOMTR-MCNC: 164 MG/DL (ref 70–99)
GLUCOSE BLDC GLUCOMTR-MCNC: 167 MG/DL (ref 70–99)
GLUCOSE BLDC GLUCOMTR-MCNC: 168 MG/DL (ref 70–99)
GLUCOSE BLDC GLUCOMTR-MCNC: 169 MG/DL (ref 70–99)
GLUCOSE BLDC GLUCOMTR-MCNC: 172 MG/DL (ref 70–99)
GLUCOSE BLDC GLUCOMTR-MCNC: 173 MG/DL (ref 70–99)
GLUCOSE BLDC GLUCOMTR-MCNC: 178 MG/DL (ref 70–99)
GLUCOSE BLDC GLUCOMTR-MCNC: 181 MG/DL (ref 70–99)
GLUCOSE SERPL-MCNC: 136 MG/DL (ref 70–99)
HCO3 BLD-SCNC: 20 MMOL/L (ref 21–28)
HCO3 SERPL-SCNC: 20 MMOL/L (ref 22–29)
HCT VFR BLD AUTO: 42.1 % (ref 35–47)
HGB BLD-MCNC: 13.9 G/DL (ref 11.7–15.7)
MAGNESIUM SERPL-MCNC: 2 MG/DL (ref 1.7–2.3)
MCH RBC QN AUTO: 31 PG (ref 26.5–33)
MCHC RBC AUTO-ENTMCNC: 33 G/DL (ref 31.5–36.5)
MCV RBC AUTO: 94 FL (ref 78–100)
O2/TOTAL GAS SETTING VFR VENT: 55 %
OXYHGB MFR BLDA: 92 % (ref 92–100)
PCO2 BLD: 35 MM HG (ref 35–45)
PEEP: 8 CM H2O
PH BLD: 7.37 [PH] (ref 7.35–7.45)
PLATELET # BLD AUTO: 190 10E3/UL (ref 150–450)
PO2 BLD: 65 MM HG (ref 80–105)
POTASSIUM SERPL-SCNC: 3.5 MMOL/L (ref 3.4–5.3)
PROT SERPL-MCNC: 5 G/DL (ref 6.4–8.3)
RBC # BLD AUTO: 4.49 10E6/UL (ref 3.8–5.2)
SAO2 % BLDA: 92.9 % (ref 96–97)
SODIUM SERPL-SCNC: 141 MMOL/L (ref 135–145)
UFH PPP CHRO-ACNC: 0.37 IU/ML
UFH PPP CHRO-ACNC: 0.52 IU/ML
WBC # BLD AUTO: 19.2 10E3/UL (ref 4–11)

## 2025-03-28 PROCEDURE — 999N000065 XR CHEST PORT 1 VIEW

## 2025-03-28 PROCEDURE — 85520 HEPARIN ASSAY: CPT | Performed by: INTERNAL MEDICINE

## 2025-03-28 PROCEDURE — 250N000013 HC RX MED GY IP 250 OP 250 PS 637: Performed by: INTERNAL MEDICINE

## 2025-03-28 PROCEDURE — 36415 COLL VENOUS BLD VENIPUNCTURE: CPT | Performed by: INTERNAL MEDICINE

## 2025-03-28 PROCEDURE — 250N000011 HC RX IP 250 OP 636: Performed by: INTERNAL MEDICINE

## 2025-03-28 PROCEDURE — 36600 WITHDRAWAL OF ARTERIAL BLOOD: CPT

## 2025-03-28 PROCEDURE — 94003 VENT MGMT INPAT SUBQ DAY: CPT

## 2025-03-28 PROCEDURE — 200N000001 HC R&B ICU

## 2025-03-28 PROCEDURE — 94640 AIRWAY INHALATION TREATMENT: CPT

## 2025-03-28 PROCEDURE — 99232 SBSQ HOSP IP/OBS MODERATE 35: CPT | Performed by: INTERNAL MEDICINE

## 2025-03-28 PROCEDURE — 36592 COLLECT BLOOD FROM PICC: CPT | Performed by: INTERNAL MEDICINE

## 2025-03-28 PROCEDURE — 250N000013 HC RX MED GY IP 250 OP 250 PS 637: Performed by: HOSPITALIST

## 2025-03-28 PROCEDURE — 250N000009 HC RX 250: Performed by: INTERNAL MEDICINE

## 2025-03-28 PROCEDURE — 999N000157 HC STATISTIC RCP TIME EA 10 MIN

## 2025-03-28 PROCEDURE — 85027 COMPLETE CBC AUTOMATED: CPT | Performed by: INTERNAL MEDICINE

## 2025-03-28 PROCEDURE — 83735 ASSAY OF MAGNESIUM: CPT | Performed by: INTERNAL MEDICINE

## 2025-03-28 PROCEDURE — 80053 COMPREHEN METABOLIC PANEL: CPT | Performed by: INTERNAL MEDICINE

## 2025-03-28 PROCEDURE — 94640 AIRWAY INHALATION TREATMENT: CPT | Mod: 76

## 2025-03-28 PROCEDURE — 82805 BLOOD GASES W/O2 SATURATION: CPT | Performed by: INTERNAL MEDICINE

## 2025-03-28 RX ADMIN — PIPERACILLIN AND TAZOBACTAM 3.38 G: 3; .375 INJECTION, POWDER, FOR SOLUTION INTRAVENOUS at 05:59

## 2025-03-28 RX ADMIN — INSULIN HUMAN 8 UNITS/HR: 1 INJECTION, SOLUTION INTRAVENOUS at 04:40

## 2025-03-28 RX ADMIN — PIPERACILLIN AND TAZOBACTAM 3.38 G: 3; .375 INJECTION, POWDER, FOR SOLUTION INTRAVENOUS at 12:10

## 2025-03-28 RX ADMIN — FLUCONAZOLE 200 MG: 2 INJECTION, SOLUTION INTRAVENOUS at 20:32

## 2025-03-28 RX ADMIN — PROPOFOL 10 MCG/KG/MIN: 10 INJECTION, EMULSION INTRAVENOUS at 18:46

## 2025-03-28 RX ADMIN — PRIMIDONE 50 MG: 50 TABLET ORAL at 23:06

## 2025-03-28 RX ADMIN — ALBUTEROL SULFATE 2 PUFF: 90 AEROSOL, METERED RESPIRATORY (INHALATION) at 04:37

## 2025-03-28 RX ADMIN — Medication 25 MCG: at 08:19

## 2025-03-28 RX ADMIN — IPRATROPIUM BROMIDE AND ALBUTEROL SULFATE 3 ML: .5; 3 SOLUTION RESPIRATORY (INHALATION) at 19:10

## 2025-03-28 RX ADMIN — IPRATROPIUM BROMIDE AND ALBUTEROL SULFATE 3 ML: .5; 3 SOLUTION RESPIRATORY (INHALATION) at 07:40

## 2025-03-28 RX ADMIN — IPRATROPIUM BROMIDE AND ALBUTEROL SULFATE 3 ML: .5; 3 SOLUTION RESPIRATORY (INHALATION) at 11:00

## 2025-03-28 RX ADMIN — INSULIN HUMAN 4 UNITS/HR: 1 INJECTION, SOLUTION INTRAVENOUS at 23:13

## 2025-03-28 RX ADMIN — PIPERACILLIN AND TAZOBACTAM 3.38 G: 3; .375 INJECTION, POWDER, FOR SOLUTION INTRAVENOUS at 00:00

## 2025-03-28 RX ADMIN — CHLORHEXIDINE GLUCONATE ORAL RINSE 15 ML: 1.2 SOLUTION DENTAL at 20:35

## 2025-03-28 RX ADMIN — METHYLPREDNISOLONE SODIUM SUCCINATE 62.5 MG: 125 INJECTION, POWDER, FOR SOLUTION INTRAMUSCULAR; INTRAVENOUS at 08:19

## 2025-03-28 RX ADMIN — IPRATROPIUM BROMIDE AND ALBUTEROL SULFATE 3 ML: .5; 3 SOLUTION RESPIRATORY (INHALATION) at 15:27

## 2025-03-28 RX ADMIN — METOLAZONE 5 MG: 2.5 TABLET ORAL at 08:45

## 2025-03-28 RX ADMIN — CHLORHEXIDINE GLUCONATE ORAL RINSE 15 ML: 1.2 SOLUTION DENTAL at 08:21

## 2025-03-28 RX ADMIN — PANTOPRAZOLE SODIUM 40 MG: 40 INJECTION, POWDER, FOR SOLUTION INTRAVENOUS at 06:12

## 2025-03-28 RX ADMIN — HEPARIN SODIUM 1100 UNITS/HR: 10000 INJECTION, SOLUTION INTRAVENOUS at 22:10

## 2025-03-28 RX ADMIN — PIPERACILLIN AND TAZOBACTAM 3.38 G: 3; .375 INJECTION, POWDER, FOR SOLUTION INTRAVENOUS at 18:58

## 2025-03-28 ASSESSMENT — ACTIVITIES OF DAILY LIVING (ADL)
ADLS_ACUITY_SCORE: 70
ADLS_ACUITY_SCORE: 74
ADLS_ACUITY_SCORE: 70
ADLS_ACUITY_SCORE: 74
ADLS_ACUITY_SCORE: 70

## 2025-03-28 NOTE — PROGRESS NOTES
"CLINICAL NUTRITION SERVICES  -  REASSESSMENT NOTE    REASON FOR ASSESSMENT:  follow up    NUTRITION INTERVENTIONS  Recommendations / Nutrition Prescription  Continue working towards goal as appropriate.   --Osmolite 1.5: Continuous feeds with a goal rate of 60ml/hr (1440 ml/hr).  --Advancement: Increase rate 5ml/hr every 8 hours if tolerating.  --Water Flushes: continue with 250ml Q8H to meet fluid needs  --This provides 2160kcal, 90g protein, 2097ml free water, 0g fiber.   --Monitor electrolytes, replace as indicated. Monitor bowel function  --Future considerations, may need to consider diabetes formula       NUTRITION HISTORY  Marly Cannon is a 59 year old female admitted for acute hypoxic/hypercapnic respritaroty failure, suspected pneumonia, shock. PMH includes A-fib with RVR,insulin dependent type 2 diabetes, COPD. Pt remains intubated. Tube feeds advancing toward goal rate, advanced to 40ml/hr this morning. IV fluids discontinued, noted free water flushes increased to 250ml per nursing communication.    Allergies   No Known Allergies    CURRENT NUTRITION ORDERS  Diet Order:   Orders Placed This Encounter      NPO for Medical/Clinical Reasons Except for: No Exceptions  Current Intake/Tolerance: Osmolite 1.5, advancing toward goal    ANTHROPOMETRICS  Height: 5' 5\"  Weight: 237 lbs 10.49 oz  Body mass index is 39.55 kg/m .  Weight Status:  Obesity Grade II BMI 35-39.9  Weight History: appears fairly stable  Wt Readings from Last 10 Encounters:   03/27/25 107.8 kg (237 lb 10.5 oz)   03/18/25 88.1 kg (194 lb 3.6 oz)   03/17/25 86 kg (189 lb 11.2 oz)   01/23/25 92.5 kg (204 lb)   12/05/24 92.9 kg (204 lb 12.5 oz)   11/25/24 91.4 kg (201 lb 8 oz)   10/30/24 91.4 kg (201 lb 9.6 oz)   08/01/24 90.4 kg (199 lb 4.7 oz)   07/18/24 90.4 kg (199 lb 4.8 oz)   05/16/24 88.1 kg (194 lb 4.8 oz)        LABS  Labs reviewed    MEDICATIONS  Medications reviewed    ASSESSED NUTRITION NEEDS:  Estimated Energy Needs: 2312 kcals " (PSU modified) 90.4kg admit weight  Estimated Protein Needs:  grams protein (1.2-1.5 g pro/Kg) 67.9kg max ibw  Estimated Fluid Needs: 2312 mL (1 mL/Kcal)     Malnutrition Diagnosis: Patient does not meet two of the criteria necessary for diagnosing malnutrition     MONITORING AND EVALUATION:  Enteral nutrition, tolerance, intake, weight, labs

## 2025-03-28 NOTE — PROGRESS NOTES
Care Transitions focused note:        PANKAJ continues to follow, [facility] /financial worker will be here on Tuesday if patient is extubated to complete financial paperwork.    Ashlyn Reyna CM

## 2025-03-28 NOTE — PLAN OF CARE
Goal Outcome Evaluation:       Plan of Care Reviewed With: patient    Overall Patient Progress: progressing         Patient remains intubated, RASS score -1. Answering yes or no questions, remained calm throughout the night and resting when staff not in room. Vent settings unchanged. VSS, afebrile. CPOT 1. Heparin and insulin gtts continue. LS with expiratory wheezing/snoring sound to right side, clear to left. Scant secretions with oral and inline suctioning. HRR. NSR 70s. Tube feedings continue, residuals as charted. BS active. Landrum catheter with adequate output of cloudy, yellow urine. Redness to perineum. Blanchable redness to buttocks, mepliex dressings in place. Generalized edema remains. ART line WDL, tubing changed this shift. CVC dressing CDI, brown and white lumens infusing, blue lumen SL with blood return noted. PIV SL x1. Repositioned and oral cares completed q2H.     Face to face report given with opportunity to observe patient.    Report given to KHADIJAH Briggs RN   3/28/2025  7:24 AM

## 2025-03-28 NOTE — PLAN OF CARE
Right wrist and Left wrist restraints continued 3/28/2025    Clinical Justification: Pulling lines, pulling tubes, and pulling equipment  Less Restrictive Alternative: Repositioning, Re-evaluate equipment, Disguise equipment, Pain management, Alarm  Attending Provider Notified: Yes, Attending Provider's Name: Dr. Schulz   New orders placed Yes  Length of Order: 1 Day      Zahida Sanford RN

## 2025-03-28 NOTE — PLAN OF CARE
"Plan of Care Reviewed With: Patient and Tavo (significant other)    Overall Patient Progress: Progressing    VS and assessments as charted. BP stable off of vasopressor(s). SR 70s with frequent PVCs. Pt remains intubated. Sedation turned off at 1105 this morning. Pt tolerating well. RASS -1. Opening eyes spontaneously. Shaking head \"yes\" and \"no\" to questions. Central line (R) internal jugular remains patent; dressing C/D/I. PIV x1. Receiving Zosyn. Infusions as follows: Heparin 950 units/hr; Regular Insulin 8 units/hr (BG checks and drip titrated as per protocol). ETT 22 cm at the gum/teeth. Spontaneous breathing trial completed this shift and was successful (from [id number]). Current vent settings: AC; FiO2 55%; PEEP 8; RR 20; Vt 450. OG tube 57 cm at the lip. Tube feeding running at 30 ml/hr (refer to flowsheets for residuals). (L) radial Art line remains in place-zeroed and leveled this shift as documented. Landrum catheter patent; urine output marginal, but quantity sufficient. Medical healing restraints in place. Pt turned/repositioned and oral cares performed approximately q 2 hours. Free from fall and/or injury this shift. Bed locked and low; alarm on.    Face to face report given with opportunity to observe patient.  Report given to KHADIJAH Teague.    Malka Brewer RN  3/27/2025, 7:51 PM             "

## 2025-03-28 NOTE — PLAN OF CARE
"SEDATION VACATION     Patient on ventilator with sedation.     Is Patient a candidate for sedation vacation Yes (per MD instruction)  If no, reason why not completed: N/A    Daily sedation interruption completed. Yes   Time of sedation interruption: 1105  How patient tolerated interruption: Pt remains off of sedation at time of this note. RASS -1. Opens eyes spontaneously. Shakes head \"yes\" and \"no\" to questions. Tolerating ETT/vent. VSS.                    "

## 2025-03-28 NOTE — PROGRESS NOTES
[facility]    Hospitalist Progress Note     59 years old female with a past medical history of hypertension, diabetes mellitus type 2, pulmonary emphysema, intellectual disability with memory issues, hyperparathyroidism and multiple other medical issues was brought to the emergency room for high blood sugars with shortness of breath. Complains of pain generalized symptoms in the chest but difficult to clarify. Not a good historian. Most of the history I also has been obtained from chart/caregivers. In the ED was noted to be tachycardic in the 130s and a subsequent EKG was concerning for SVT. Repeat EKG had shown A-fib with rapid ventricular response. COVID influenza and RSV is negative. CBC showed a white count of 16.4 with a hemoglobin of 16.8 and a platelet count of 260. CMP showed sodium 134 potassium 4.5 BUN of 48.1 with a creatinine of 1.32. AST/ALT was 21/10. VBG showed a pH of 1.39 with a pCO2 of 38 and a bicarb of 23. Lactic acid is 1.8. Ketones was 1.89. Follow-up CT chest shows a small pulmonary artery embolism to the subsegmental branches of the left lower lobe with no right heart strain. Patient was initiated on therapeutic Lovenox in addition to insulin drip for possible diabetic ketoacidosis. Cardizem infusion was initiated for SVT/A-fib. She was admitted for further evaluation.         Assessment & Plan  Marly Cannon is a 59 year old female who was admitted on 3/22/2025.      Acute hypoxic/hypercapnic respiratory failure: Patient intubated afternoon of 3/23.  Etiology uncertain, however possibility includes PE, aspiration, pneumonia, versus bad COPD.  CTA chest on admission showed clear lungs, small subsegmental PE.  Placed on therapeutic Lovenox at that time.  Patient is subsequently required BiPAP, then intubation over the course of 8 to 10 hours.  Serial chest x-rays have been stable/clear.  Worst ABG showed pH of 7.05 with pCO2 103, suggesting retention.COPD exacerbation?  -3/24:  Intubated, sedated on 3/23.  Ventilator settings with FiO2 of 70%, PEEP of 5.  Latest ABG pH 7.34, pCO2 41, pO2 of 85..  Repeat CTA chest shows progression of right lower lobe pulmonary embolism, now involving the segmental as well as a subsegmental arteries.  There is collapse of the entire right lower lobe with opacification of bronchi within the right lower lobe, possible mucous plugging from aspiration of contents?  Continuing Zosyn  -3/25: Remains intubated, sedated.  Ventilator settings with FiO2 at 60%, PEEP of 5.  ABG with pH of 7.33.  Chest x-ray stable, persistent collapsed middle lobe.  On Zosyn, possible aspiration versus mucous plug.  Chest physiotherapy ordered by telemetry ICU.  -3/26: Patient remains on the ventilator.  ABG with pH 7.37 pCO2 34 pO2 79 bicarb 20.  Sats been running 96+ percent.  She is on 80% FiO2.  Repeat chest x-ray no significant changes.  Still has what appears to be right lower lobe collapse.  Clear lungs on exam.  He is getting significant amount of IV fluid over the last 24 hours of most 5 L.  No significant wheezing.  Will try and titrate down on the FiO2.  Somewhat surprising requires so much oxygen at this point.  His lung fields specially left lung is relatively clear.  No significant secretions.  Sedated with the Versed and fentanyl.  Continue with aggressive pulmonary toilet.  Wean FiO2.  Is on antibiotics for potential aspiration.  Getting IV steroids every 6 hours will cut back on the dosing of this also.  -3/27: Patient remains intubated.  Her oxygen requirements have been decreased down to 55% FiO2.  Arterial blood gas has a pH of 7.39 pCO2 34 pO2 56.  Chest x-ray still shows to the right lower lobe probable collapse.  Some mild pleural effusions bilaterally.  He is only on fentanyl currently for sedation.  Treating for pneumonia and her pulmonary embolism.  Chronically is on 2 L of O2.  -3/28: Reviewed patient's chart.  As an outpatient she does have a significant  history of fairly severe COPD.  She has had variable PFTs performed which all have showed obstructive lung disease but variable DLCO was due to poor patient cooperation with the studies.  There was also concern of a fixed obstruction however evaluation by ENT and CT scans were not able to document any of this finding.  She also does have severe obstructive sleep apnea with severe sleep associated hypoxemia they recommended BiPAP however patient was unable to tolerate this and refused any further intervention or trials.  Currently patient remains intubated.  ABG still looks good pH 7.37 pCO2 35 pO2 65.  Currently is on assist-control rate of 20 tidal volume 400 PEEP of 8 she is still on 55% FiO2.  Sats been fine greater than 90%.  She was on CPAP for well over 3 to 4 hours yesterday and tolerated it without issue.  Chest x-ray shows probably pleural effusions.  Minimal secretions.  Still have her on bronchodilators some steroids.  Minimal sedation.  Have some concern regarding this.  Will evaluate neurostatus further here today.  Pulmonary embolism be treated with heparin.  Does have fairly severe underlying COPD.  Also fairly severe sleep apnea.  She is very alert and appropriate this morning.  Down to 50% FiO2.  Still on PEEP of 8.  Will try to put her on CPAP again here this morning for a while and see how she does.  Not quite sure she is ready for attempted extubation.  Will see how she does and discuss further with tele-ICU.      Suspected pneumonia: initial CTA chest shows clear lungs, however patient tachypneic, increased work of breathing.  Oxygen requirement likely due to COPD.  Repeat CTA findings as above.  -Continuing Zosyn  -Continue to monitor respiratory status on ventilator  -3/26: Currently is on Zosyn/vancomycin.  MRSA/SA swab negative.  White count is 22,000.  No major fevers.  Blood cultures remain negative.  Minimal secretions.  -3/28: Have discontinued vancomycin.  Patient remains on Zosyn.   Blood cultures negative.  A sputum sample was obtained couple days ago does show 3+ yeast and 2+ gram-positive cocci in pairs culture is in process.  Remains afebrile.  White count still elevated 19,000.     Shock: Uncertain etiology.  Will need to rule out obstructive shock with CTA.  Patient is now on moderate amount of Levophed.  She has been adequately volume resuscitated per sepsis protocol.  -3/24: Maintenance fluids.  Art line inserted, will titrate pressors to MAP of 60.  Echocardiogram ordered.  WBC 15.5, Tmax 99.7 in last 24 hours.  Urinalysis negative for significant pyuria.  Initial CTA on admission showed clear lungs.  CTA without massive saddle embolus, CT abdomen pelvis with dilated gallbladder, but no biliary dilatation, no obvious obstruction.  No thickened gallbladder wall, no fluid collection.  CT also shows subcutaneous edema in the lower anterior abdominal wall and into the right labia.  Possible cellulitic infection?  Will continue IV Zosyn.  Adding vancomycin  -3/25: Patient with persistent vasopressor requirement, 0.08 Levophed.  Turned up periodically to 0.11.  Possible source of infections include aspiration pneumonia, cellulitis of lower abdominal wall/labia, gallbladder.  LFTs have remained unremarkable.  Continuing Zosyn/vancomycin  -3/26: Blood pressures have been greater than 100 systolic.  He is on 0.05 of Levophed.  Will continue to titrate this down his tolerated.  Has received significant IV fluids at this point.  Urine output has been on the somewhat oliguric side.  Echocardiogram was a very technically difficult study the LV function was normal however.  Right ventricle did not appear to be grossly dilated.  -3/27: Patient had what appeared to be started a supraventricular rhythm throughout yesterday.  She reverted back to sinus rhythm around 8 PM at that time her pressures did come up significantly.  She is no longer requiring any pressors.  -3/28: Patient patric off pressors.   Blood pressures been greater than 100 systolic.  Heart rates been in the 70s in sinus rhythm.  She is very sensitive to her atrial dysrhythmias.  Once she converted back to sinus rhythm she immediately had significant improvement in her blood pressures.  Echocardiogram has shown normal systolic function.  She is off the amiodarone.  Volume wise she probably is volume up in terms of the amount of IV fluid she is received.  Weights have shown significant weight gain over her stay she was 90.4 kg 107.8 yesterday.     Encephalopathy: Toxic versus metabolic.  Patient is intubated and sedated.  -3/24: CT of the head negative for acute pathology.  -3/26: Patient sedated at this point difficult to really assess her mental status.  -3/28: Patient's morning does open her eyes to voice.  Follows commands appropriately.  Nods yes and no.  Squeezes hands moves toes.  She is on no sedation at this time.     Pulmonary embolism: Subsegmental with no right heart strain at least on CT.  Patient given therapeutic Lovenox in the emergency room.  -Echocardiogram ordered  -3/24: Currently on Lovenox twice daily.  Given worsening renal function, will switch her to heparin  -3/25: Continuing on heparin gtt  -3/26: PE on heparin drip.  -3/27: Continues on the heparin infusion.  -3/28: At some point we will switch over to oral anticoagulant     CVS:  Patient is initial EKG with A-fib RVR with heart rate in the 150s.  Patient received diltiazem, now she is converted.  She was on p.o. diltiazem 60 mg every 8 hours scheduled, as well as a diltiazem drip.  This was stopped due to hypotension.  Patient has also had self-limited runs of SVT.  -3/24: Shock requiring pressors.  Patient currently normal sinus rhythm but rate is tachy at 120.  Echocardiogram is ordered.  Given hypotension, cannot use calcium channel blocker or beta-blocker.  EKG shows SVT versus aflutter.  Will start her on amiodarone drip.  Continuing anticoagulation with heparin  gtt  -3/25: Patient remains on Levophed, amiodarone.  Heart rate better controlled in the 1 teens to 120s, but still appears to be SVT/aflutter.  Continues on heparin drip as well.  Source of shock uncertain.  Echocardiogram official read with ejection fraction of 65 to 70%, hyperdynamic LV, no significant valvular or structural abnormalities.  -3/26: Heart rate rate around 115.  Is regular.  Cannot really tell on telemetry if this is not a flutter or sinus tachycardia will obtain twelve-lead EKG.  He is on amiodarone as apparently did have SVT and A-fib earlier.  -3/27: Patient did revert to sinus rhythm last night heart rates have been now in the 60-70 range.  Amiodarone was discontinued.  -3/28: As above she does not seem to tolerate any type of atrial dysrhythmia.  Does get rather hypotensive.  She is been in sinus rhythm with blood pressures doing very well anywhere from 105-100 30s systolic range.  Art line is in place.  Likely will be able to discontinue this in the next 24 hours.  Site still looks okay.     Uncontrolled diabetes mellitus type 2, insulin-dependent: This is despite insulin pump.  Patient had functioning insulin pump on 3/20/2025 when she was discharged from this facility.  Anion gap is 17, but blood glucoses have been in the 200s.  Serum ketones resolved for a time, but now have returned.  Ketones uptrending  -3/23: Insulin pump removed.  Was given long-acting insulin 20 units of Toujeo this a.m., ketones continuing to uptrend.  Will place her back on insulin drip at this time.  -3/24: Patient on insulin drip.  Shutting off periodically for euglycemia.  -3/25: Patient intermittently on insulin drip.  Bicarb 17, anion gap closed at 15.  Holding subcu insulin in favor of insulin drip at this time  -3/26: Is on the insulin infusion at this point.  Sugars are appropriate.  He is getting tube feedings at goal rate.  -3/27: Remains on insulin infusion sugars have been fine 120 range.  -3/28: May  consider subcu transition in the next day or so also.  She remains on tube feedings and tolerating them well.     YADIRA: Creatinine rising since admission, now 1.64.  Previously slightly above 1, on presentation was 1.3.  Patient did receive contrast.  -3/24: Creatinine 1.91.  Maintaining IV fluids.  Monitoring renal function and urine output  -3/25: Creatinine climbing to 2.06 despite IV fluids.  Patient did receive IV contrast, possible contrast nephropathy.  Urine output has been adequate.  -3/26: Somewhat oliguric.  Urine output only 1 L over the last 24 hours.  A total of 5163 cc in.  BUN/creatinine are slightly improved to 55/1.91.  Potassium 3.8 sodium is 144.  Minimal lower extremity edema.  At some point may require at least some Lasix.  -3/27: Still somewhat oliguric.  Creatinine has jumped up somewhat today to 2.09 BUN 60.5 sodium 148.  Does need increasing free water.  Will administer this via her gastric tube.  Also change to half-normal saline.  Landrum catheter is in place.  Repeat UA urine sodium and creatinine.  -3/28: Urine output is picking up a bit.  Was started on metolazone recommended by tele ICU her creatinine is no longer rising.  Is down to 1.95.  Potassium is 3.5.  Magnesium was 2.0.  Serum sodium is also improved.  Have been giving increased free water via her oral gastric tube.     COPD: Oxygen dependence established last admission.  Patient was discharged on 2 L nasal cannula after last admission.     FEN: Diabetic diet as tolerated.    Diet: NPO for Medical/Clinical Reasons Except for: No Exceptions  Adult Formula Drip Feeding: Specified Time Osmolite 1.5; Orogastric tube; Goal Rate: 60; mL/hr; Keep at 10ml/hr for 24 hours. After 24 hours advance by 5ml/hr Q8H.  Fluids: None    DVT Prophylaxis: IV heparin  Code Status: Full Code    Disposition: Expected discharge unclear still critically ill  Prosper Schulz MD    Interval History   Patient is not on any sedation currently does follow  commands nods appropriately.  Denies being in any distress at all.  Remains hemodynamically stable afebrile oxygenation is stable however still on ventilator 50% FiO2.    Remains in sinus rhythm.  ABG looks good also.  Continue with current antibiotics, IV insulin, IV heparin.  Will continue to work on trying to get her off ventilator we will try some CPAP today weaning parameters.    -Data reviewed today: I reviewed all new labs and imaging results over the last 24 hours. I personally reviewed the chest x-ray image(s) showing look about the same.  Pleural effusion noted may be less right lower lobe findings. .    Physical Exam   Temp: 96.8  F (36  C) Temp src: Tympanic BP: 100/75 Pulse: 74   Resp: 20 SpO2: 92 % O2 Device: Mechanical Ventilator    Vitals:    03/25/25 0409 03/26/25 0622 03/27/25 0441   Weight: 97 kg (213 lb 13.5 oz) 102.1 kg (225 lb 1.4 oz) 107.8 kg (237 lb 10.5 oz)     Vital Signs with Ranges  Temp:  [96.8  F (36  C)-97.7  F (36.5  C)] 96.8  F (36  C)  Pulse:  [64-85] 74  Resp:  [13-27] 20  BP: (100-151)/() 100/75  MAP:  [0 mmHg-284 mmHg] 72 mmHg  Arterial Line BP: (0-148)/(0-67) 120/53  FiO2 (%):  [55 %] 55 %  SpO2:  [89 %-95 %] 92 %  I/O last 3 completed shifts:  In: 4793.4 [I.V.:2969.4; NG/GT:1197]  Out: 1305 [Urine:1305]    Peripheral IV 03/25/25 Anterior;Left Lower forearm (Active)   Site Assessment WDL 03/28/25 0600   Line Status Infusing 03/28/25 0600   Dressing Transparent 03/28/25 0600   Dressing Status clean;dry;intact 03/28/25 0600   Line Intervention Flushed 03/28/25 0400   Line Necessity Yes, meets criteria 03/27/25 1810   Phlebitis Scale 0-->no symptoms 03/28/25 0600   Infiltration? no 03/28/25 0600   Number of days: 3       Arterial Line 03/23/25 Radial (Active)   Site Assessment WDL 03/28/25 0600   Line Status Pulsatile blood flow 03/28/25 0600   Art Line Waveform Appropriate 03/28/25 0600   Art Line Interventions Zeroed and calibrated;Leveled;Tubing changed;Connections checked  and tightened;Flushed per protocol;Line pulled back 03/27/25 2200   Color/Movement/Sensation Capillary refill less than 3 sec 03/28/25 0600   Line Necessity Yes, meets criteria 03/28/25 0600   Dressing Type Transparent 03/28/25 0600   Dressing Status Clean, dry, intact 03/28/25 0600   Dressing Intervention Dressing changed/new dressing 03/27/25 2200   Number of days: 5       CVC Triple Lumen Right Internal jugular (Active)   Site Assessment WDL 03/28/25 0600   Dressing Chlorhexidine disk;Transparent 03/28/25 0600   Dressing Status clean;dry;intact 03/28/25 0600   Dressing Intervention dressing changed 03/26/25 0600   Line Necessity Yes, meets criteria 03/28/25 0600   Blue - Status saline locked;blood return noted 03/28/25 0600   Blue - Intervention Flushed 03/28/25 0600   Brown - Status infusing 03/28/25 0600   Brown - Intervention Flushed 03/27/25 0300   White - Status infusing 03/28/25 0600   White - Intervention Flushed 03/25/25 0800   Phlebitis Scale 0-->no symptoms 03/28/25 0600   Infiltration? no 03/28/25 0600   Number of days: 5       ETT Cuffed 7.5 mm (Active)   Secured at (cm) 22 cm 03/28/25 0600   Measured from Teeth/Gums 03/28/25 0600   Position Right 03/28/25 0600   Secured by Commercial tube haque 03/28/25 0600   Bite Block Included with Commercial tube haque 03/28/25 0600   Site Appearance Clean;Dry 03/28/25 0600   Tube Care Site care done 03/26/25 1616   Cuff Assessment Cuff Pressure 03/28/25 0428   Cuff Pressure - cm H2O 28 cmH20 03/28/25 0428   Safety Measures Manual resuscitator at bedside 03/28/25 0600   Number of days: 5       GI Enteral Access Device Mouth, center Gastric 18 fr (Active)   Status Feeding 03/28/25 0606   Placement Confirmation [place] unchanged 03/28/25 0606   Surrounding Skin Assessment WDL 03/28/25 0606   Insertion Site Assessment [facility] 03/28/25 0606   [place] (cm marking) at nare/mouth 57 cm 03/28/25 0606   [place] (visual) [place] at entry site (nare, mouth, etc.) 03/28/25  0606   Securement Device Tape 03/28/25 0606   Drainage Tan 03/28/25 0358   Intake (mL) 72 ml 03/27/25 1735   Flush/Free Water (mL) 250 mL 03/28/25 0432   Residual (mL) 195 mL 03/28/25 0432   Output (mL) 500 ml 03/25/25 0600   Number of days: 5       Urinary Drain 03/23/25 Urethral Catheter (Active)   Tube Description Positional 03/28/25 0358   Catheter Care Catheter wipes 03/28/25 0358   Perineal Skin Assessment Erythema 03/28/25 0358   Collection Container Standard 03/28/25 0358   Securement Method Commercial securement device 03/28/25 0358   Rationale for Continued Use ICU only: hourly urine output needed for patient care 03/28/25 0358   Urine Output 130 mL 03/28/25 0606   Number of days: 5     Central line in place still required site looks good.  Left wrist arterial line looks good still good waveform however we will likely discontinue this in the next 24 hours.    Constitutional: Patient is alert following commands no obvious distress.      HEENT: Normocephalic/atraumatic, PERRL, EOMI, mouth oral gastric tube and ET tube in place secured, neck supple, no LN. right IJ central line.     Cardiovascular: RRR.  No murmur, no  rubs, or gallops.  JVD unable.  .  Pulses 2    Respiratory: Right lung field few rhonchi noted compared to left.  Clear to auscultation bilaterally    Abdomen: Soft, nontender, nondistended, no organomegaly. Bowel sounds present    Extremities: Warm/dry.  1-2+ anasarca      Neuro:   Non focal, cranial nerves intact, Moves all extremities.  Medications   Current Facility-Administered Medications   Medication Dose Route Frequency Provider Last Rate Last Admin    dextrose 10% infusion   Intravenous Continuous PRN Bibi Cancino MD        heparin 25,000 units in 0.45% NaCl 250 mL ANTICOAGULANT infusion  0-5,000 Units/hr Intravenous Continuous Bibi Cancino MD 11 mL/hr at 03/27/25 2221 1,100 Units/hr at 03/27/25 2221    insulin regular (MYXREDLIN) 1 unit/mL infusion  0-24 Units/hr Intravenous  Continuous Bibi Cancino MD 6 mL/hr at 03/28/25 0606 6 Units/hr at 03/28/25 0606    propofol (DIPRIVAN) infusion  5-75 mcg/kg/min Intravenous Continuous Prosper Schulz MD        And    Medication Instruction   Does not apply Continuous PRN Prosper Schulz MD        No lozenges or gum should be given while patient on BIPAP/AVAPS/AVAPS AE   Does not apply Continuous PRN Bibi Cancino MD        norepinephrine (LEVOPHED) 4 mg in  mL infusion PREMIX  0.01-0.6 mcg/kg/min Intravenous Continuous Olga Gaytan MD   Stopped at 03/26/25 1957    Patient is already receiving anticoagulation with heparin, enoxaparin (LOVENOX), warfarin (COUMADIN)  or other anticoagulant medication   Does not apply Continuous PRN Te Parish MD        [Held by provider] sodium chloride 0.45% infusion   Intravenous Continuous Prosper Schulz MD   Stopped at 03/27/25 1751     Current Facility-Administered Medications   Medication Dose Route Frequency Provider Last Rate Last Admin    [Held by provider] aspirin (ASA) chewable tablet 81 mg  81 mg Oral Daily Te Parish MD   81 mg at 03/24/25 0948    [Held by provider] atorvastatin (LIPITOR) tablet 40 mg  40 mg Oral At Bedtime Te Parish MD   40 mg at 03/23/25 2301    chlorhexidine (PERIDEX) 0.12 % solution 15 mL  15 mL Mouth/Throat Q12H Gentry Saez DO   15 mL at 03/27/25 2030    [Held by provider] diltiazem (CARDIZEM) tablet 60 mg  60 mg Oral Q8H [facility] Te Parish MD   60 mg at 03/23/25 0514    [Held by provider] empagliflozin (JARDIANCE) tablet 25 mg  25 mg Oral Daily Te Parish MD   25 mg at 03/24/25 0948    [Held by provider] enoxaparin ANTICOAGULANT (LOVENOX) injection 70 mg  0.75 mg/kg Subcutaneous Q12H Te Parish MD   70 mg at 03/24/25 0956    fluconazole (DIFLUCAN) intermittent infusion 200 mg  200 mg Intravenous Q24H Bibi Cancino  mL/hr at 03/27/25 1839 200 mg at 03/27/25 1839     [Held by provider] insulin glargine U-300 (TOUJEO) injection 20 Units  20 Units Subcutaneous QA Te Parish MD   20 Units at 03/23/25 0911    ipratropium - albuterol 0.5 mg/2.5 mg/3 mL (DUONEB) neb solution 3 mL  3 mL Nebulization 4x daily Delmy Harris MD   3 mL at 03/27/25 1929    methylPREDNISolone Na Suc (solu-MEDROL) injection 62.5 mg  62.5 mg Intravenous Q12H Prosper Schulz MD   62.5 mg at 03/27/25 2011    metolazone (ZAROXOLYN) tablet 5 mg  5 mg Per OG Tube Daily Prosper Schulz MD   5 mg at 03/27/25 1738    pantoprazole (PROTONIX) 2 mg/mL suspension 40 mg  40 mg Per Feeding [facility] Gentry Saez P, DO        Or    pantoprazole (PROTONIX) IV push injection 40 mg  40 mg Intravenous [facility] Gentry Saez DO   40 mg at 03/28/25 0612    piperacillin-tazobactam (ZOSYN) 3.375 g vial to attach to  mL bag  3.375 g Intravenous Q6H Bibi Cancino MD   3.375 g at 03/28/25 0559    primidone (MYSOLINE) tablet 50 mg  50 mg Oral or Feeding Tube At Bedtime Bibi Cancino MD   50 mg at 03/27/25 2202    Vitamin D3 (CHOLECALCIFEROL) tablet 25 mcg  25 mcg Oral or Feeding Tube Daily Bibi Cancino MD   25 mcg at 03/27/25 0848       Data   Recent Labs   Lab 03/28/25  0659 03/28/25  0604 03/28/25  0503 03/28/25  0401 03/28/25  0236 03/28/25  0235 03/27/25  0502 03/27/25  0441 03/26/25  0606 03/26/25  0324 03/23/25  1856 03/23/25  1826   WBC  --   --   --   --  19.2*  --   --  17.0*  --  22.1*   < >  --    HGB  --   --   --   --  13.9  --   --  14.1  --  14.6   < >  --    MCV  --   --   --   --  94  --   --  95  --  95   < >  --    PLT  --   --   --   --  190  --   --  190  --  252   < >  --    INR  --   --   --   --   --   --   --   --   --   --   --  1.29*   NA  --   --   --   --   --  141  --  148*  --  144   < > 143   POTASSIUM  --   --   --   --   --  3.5  --  3.8  --  3.8   < > 4.0   CHLORIDE  --   --   --   --   --  112*  --  117*  --  115*   < > 107   CO2  --   --   --   --    --  20*  --  20*  --  18*   < > 24   BUN  --   --   --   --   --  59.1*  --  60.5*  --  55.1*   < > 56.6*   CR  --   --   --   --   --  1.95*  --  2.09*  --  1.91*   < > 1.91*   ANIONGAP  --   --   --   --   --  9  --  11  --  11   < > 12   NORM  --   --   --   --   --  8.1*  --  8.6*  --  8.4*   < > 7.9*   * 161* 178*   < >  --  136*   < > 123*   < > 123*   < > 157*   ALBUMIN  --   --   --   --   --  1.7*  --  1.7*  --  1.8*   < >  --    PROTTOTAL  --   --   --   --   --  5.0*  --  5.2*  --  5.5*   < >  --    BILITOTAL  --   --   --   --   --  0.3  --  0.3  --  0.2   < >  --    ALKPHOS  --   --   --   --   --  79  --  83  --  88   < >  --    ALT  --   --   --   --   --  19  --  23  --  23   < >  --    AST  --   --   --   --   --  29  --  44  --  54*   < >  --     < > = values in this interval not displayed.       Recent Results (from the past 24 hours)   XR Chest Port 1 View    Narrative    EXAM: XR CHEST PORT 1 VIEW  LOCATION: [facility]  DATE: 3/28/2025    INDICATION: intubated  COMPARISON: Multiple priors, most recent 3/27/2025      Impression    IMPRESSION: Endotracheal tube tip in good position 7 cm above the carlos. Enteric tube extending below level the EG junction off the inferior aspect of the image. Right IJ central venous catheter with tip in the mid SVC. Mild right basilar atelectasis   and pleural fluid. Atelectasis medial left lung base.

## 2025-03-28 NOTE — PROGRESS NOTES
Patient remains on ventilator with settings at:  Mode: A/C  RR: 20  VT: 450  FIO2: 55  PEEP: 8  ET tube secured at: 22 Size: 7.5  Cuff checked: Yes cuff pressure 28 cmH2O  Breath sounds: diminished clear right LL wheeze crackles  SpO2: >92%  Suction: scant amount of white  Weaning trial attempted: no  Ambu bag present: Yes

## 2025-03-28 NOTE — PHARMACY-MEDICATION REGIMEN REVIEW
Pharmacy Antimicrobial Stewardship Assessment     Current Antimicrobial Therapy:                 Culture Results:     Recommendations/Interventions:  1. Continue fluconazole for 7 days total assuming improvement of symptoms.  2. Consider drawing procalcitonin to help guide antibiotics de-escalation    Renea Wallace RPH  March 28, 2025

## 2025-03-29 ENCOUNTER — APPOINTMENT (OUTPATIENT)
Dept: GENERAL RADIOLOGY | Facility: HOSPITAL | Age: 60
DRG: 870 | End: 2025-03-29
Attending: INTERNAL MEDICINE
Payer: COMMERCIAL

## 2025-03-29 LAB
ALBUMIN SERPL BCG-MCNC: 1.8 G/DL (ref 3.5–5.2)
ALLEN'S TEST: ABNORMAL
ALLEN'S TEST: ABNORMAL
ALP SERPL-CCNC: 76 U/L (ref 40–150)
ALT SERPL W P-5'-P-CCNC: 18 U/L (ref 0–50)
ANION GAP SERPL CALCULATED.3IONS-SCNC: 11 MMOL/L (ref 7–15)
AST SERPL W P-5'-P-CCNC: 26 U/L (ref 0–45)
BACTERIA SPT CULT: ABNORMAL
BASE EXCESS BLDA CALC-SCNC: -2.1 MMOL/L (ref -3–3)
BASE EXCESS BLDA CALC-SCNC: -4.3 MMOL/L (ref -3–3)
BILIRUB SERPL-MCNC: 0.3 MG/DL
BUN SERPL-MCNC: 60.8 MG/DL (ref 8–23)
CALCIUM SERPL-MCNC: 8.2 MG/DL (ref 8.8–10.4)
CHLORIDE SERPL-SCNC: 110 MMOL/L (ref 98–107)
CREAT SERPL-MCNC: 2.01 MG/DL (ref 0.51–0.95)
CRP SERPL-MCNC: 16.84 MG/L
EGFRCR SERPLBLD CKD-EPI 2021: 28 ML/MIN/1.73M2
ERYTHROCYTE [DISTWIDTH] IN BLOOD BY AUTOMATED COUNT: 14.8 % (ref 10–15)
GLUCOSE BLDC GLUCOMTR-MCNC: 123 MG/DL (ref 70–99)
GLUCOSE BLDC GLUCOMTR-MCNC: 128 MG/DL (ref 70–99)
GLUCOSE BLDC GLUCOMTR-MCNC: 129 MG/DL (ref 70–99)
GLUCOSE BLDC GLUCOMTR-MCNC: 130 MG/DL (ref 70–99)
GLUCOSE BLDC GLUCOMTR-MCNC: 131 MG/DL (ref 70–99)
GLUCOSE BLDC GLUCOMTR-MCNC: 132 MG/DL (ref 70–99)
GLUCOSE BLDC GLUCOMTR-MCNC: 133 MG/DL (ref 70–99)
GLUCOSE BLDC GLUCOMTR-MCNC: 136 MG/DL (ref 70–99)
GLUCOSE BLDC GLUCOMTR-MCNC: 138 MG/DL (ref 70–99)
GLUCOSE BLDC GLUCOMTR-MCNC: 143 MG/DL (ref 70–99)
GLUCOSE BLDC GLUCOMTR-MCNC: 151 MG/DL (ref 70–99)
GLUCOSE BLDC GLUCOMTR-MCNC: 151 MG/DL (ref 70–99)
GLUCOSE BLDC GLUCOMTR-MCNC: 152 MG/DL (ref 70–99)
GLUCOSE BLDC GLUCOMTR-MCNC: 159 MG/DL (ref 70–99)
GLUCOSE BLDC GLUCOMTR-MCNC: 167 MG/DL (ref 70–99)
GLUCOSE BLDC GLUCOMTR-MCNC: 173 MG/DL (ref 70–99)
GLUCOSE SERPL-MCNC: 155 MG/DL (ref 70–99)
GRAM STAIN RESULT: ABNORMAL
HCO3 BLD-SCNC: 20 MMOL/L (ref 21–28)
HCO3 BLD-SCNC: 22 MMOL/L (ref 21–28)
HCO3 SERPL-SCNC: 18 MMOL/L (ref 22–29)
HCT VFR BLD AUTO: 42.1 % (ref 35–47)
HGB BLD-MCNC: 14 G/DL (ref 11.7–15.7)
HOLD SPECIMEN: NORMAL
MAGNESIUM SERPL-MCNC: 2 MG/DL (ref 1.7–2.3)
MCH RBC QN AUTO: 30.7 PG (ref 26.5–33)
MCHC RBC AUTO-ENTMCNC: 33.3 G/DL (ref 31.5–36.5)
MCV RBC AUTO: 92 FL (ref 78–100)
O2/TOTAL GAS SETTING VFR VENT: 50 %
O2/TOTAL GAS SETTING VFR VENT: 85 %
OXYHGB MFR BLDA: 94 % (ref 92–100)
OXYHGB MFR BLDA: 94 % (ref 92–100)
PCO2 BLD: 32 MM HG (ref 35–45)
PCO2 BLD: 33 MM HG (ref 35–45)
PEEP: 5 CM H2O
PEEP: 5 CM H2O
PH BLD: 7.4 [PH] (ref 7.35–7.45)
PH BLD: 7.42 [PH] (ref 7.35–7.45)
PLATELET # BLD AUTO: 217 10E3/UL (ref 150–450)
PO2 BLD: 69 MM HG (ref 80–105)
PO2 BLD: 72 MM HG (ref 80–105)
POTASSIUM SERPL-SCNC: 3.9 MMOL/L (ref 3.4–5.3)
PROCALCITONIN SERPL IA-MCNC: 0.66 NG/ML
PROT SERPL-MCNC: 5 G/DL (ref 6.4–8.3)
RBC # BLD AUTO: 4.56 10E6/UL (ref 3.8–5.2)
SAO2 % BLDA: 94.5 % (ref 96–97)
SAO2 % BLDA: 95.2 % (ref 96–97)
SODIUM SERPL-SCNC: 139 MMOL/L (ref 135–145)
UFH PPP CHRO-ACNC: 0.44 IU/ML
WBC # BLD AUTO: 20.5 10E3/UL (ref 4–11)

## 2025-03-29 PROCEDURE — 250N000009 HC RX 250: Performed by: INTERNAL MEDICINE

## 2025-03-29 PROCEDURE — 250N000013 HC RX MED GY IP 250 OP 250 PS 637: Performed by: INTERNAL MEDICINE

## 2025-03-29 PROCEDURE — 36415 COLL VENOUS BLD VENIPUNCTURE: CPT | Performed by: INTERNAL MEDICINE

## 2025-03-29 PROCEDURE — 85520 HEPARIN ASSAY: CPT | Performed by: INTERNAL MEDICINE

## 2025-03-29 PROCEDURE — 85014 HEMATOCRIT: CPT | Performed by: INTERNAL MEDICINE

## 2025-03-29 PROCEDURE — 71045 X-RAY EXAM CHEST 1 VIEW: CPT | Mod: 77

## 2025-03-29 PROCEDURE — 83735 ASSAY OF MAGNESIUM: CPT | Performed by: INTERNAL MEDICINE

## 2025-03-29 PROCEDURE — 84155 ASSAY OF PROTEIN SERUM: CPT | Performed by: INTERNAL MEDICINE

## 2025-03-29 PROCEDURE — 94003 VENT MGMT INPAT SUBQ DAY: CPT

## 2025-03-29 PROCEDURE — 86140 C-REACTIVE PROTEIN: CPT | Performed by: INTERNAL MEDICINE

## 2025-03-29 PROCEDURE — 36600 WITHDRAWAL OF ARTERIAL BLOOD: CPT

## 2025-03-29 PROCEDURE — 250N000011 HC RX IP 250 OP 636: Performed by: INTERNAL MEDICINE

## 2025-03-29 PROCEDURE — 250N000011 HC RX IP 250 OP 636: Mod: JW | Performed by: STUDENT IN AN ORGANIZED HEALTH CARE EDUCATION/TRAINING PROGRAM

## 2025-03-29 PROCEDURE — 999N000157 HC STATISTIC RCP TIME EA 10 MIN

## 2025-03-29 PROCEDURE — 99233 SBSQ HOSP IP/OBS HIGH 50: CPT | Performed by: INTERNAL MEDICINE

## 2025-03-29 PROCEDURE — 200N000001 HC R&B ICU

## 2025-03-29 PROCEDURE — 82805 BLOOD GASES W/O2 SATURATION: CPT | Performed by: INTERNAL MEDICINE

## 2025-03-29 PROCEDURE — 999N000253 HC STATISTIC WEANING TRIALS

## 2025-03-29 PROCEDURE — 94640 AIRWAY INHALATION TREATMENT: CPT

## 2025-03-29 PROCEDURE — 94640 AIRWAY INHALATION TREATMENT: CPT | Mod: 76

## 2025-03-29 PROCEDURE — 71045 X-RAY EXAM CHEST 1 VIEW: CPT

## 2025-03-29 PROCEDURE — 84145 PROCALCITONIN (PCT): CPT | Performed by: INTERNAL MEDICINE

## 2025-03-29 RX ORDER — BUMETANIDE 0.25 MG/ML
0.5 INJECTION, SOLUTION INTRAMUSCULAR; INTRAVENOUS EVERY 24 HOURS
Status: DISCONTINUED | OUTPATIENT
Start: 2025-03-29 | End: 2025-03-30

## 2025-03-29 RX ADMIN — Medication 25 MCG: at 08:26

## 2025-03-29 RX ADMIN — IPRATROPIUM BROMIDE AND ALBUTEROL SULFATE 3 ML: .5; 3 SOLUTION RESPIRATORY (INHALATION) at 19:59

## 2025-03-29 RX ADMIN — PROPOFOL 15 MCG/KG/MIN: 10 INJECTION, EMULSION INTRAVENOUS at 18:32

## 2025-03-29 RX ADMIN — METOLAZONE 5 MG: 2.5 TABLET ORAL at 08:48

## 2025-03-29 RX ADMIN — IPRATROPIUM BROMIDE AND ALBUTEROL SULFATE 3 ML: .5; 3 SOLUTION RESPIRATORY (INHALATION) at 16:21

## 2025-03-29 RX ADMIN — PIPERACILLIN AND TAZOBACTAM 3.38 G: 3; .375 INJECTION, POWDER, FOR SOLUTION INTRAVENOUS at 14:37

## 2025-03-29 RX ADMIN — CHLORHEXIDINE GLUCONATE ORAL RINSE 15 ML: 1.2 SOLUTION DENTAL at 08:27

## 2025-03-29 RX ADMIN — PIPERACILLIN AND TAZOBACTAM 3.38 G: 3; .375 INJECTION, POWDER, FOR SOLUTION INTRAVENOUS at 08:26

## 2025-03-29 RX ADMIN — PRIMIDONE 50 MG: 50 TABLET ORAL at 22:16

## 2025-03-29 RX ADMIN — PANTOPRAZOLE SODIUM 40 MG: 40 INJECTION, POWDER, FOR SOLUTION INTRAVENOUS at 08:27

## 2025-03-29 RX ADMIN — BUMETANIDE 0.5 MG: 0.25 INJECTION INTRAMUSCULAR; INTRAVENOUS at 12:14

## 2025-03-29 RX ADMIN — IPRATROPIUM BROMIDE AND ALBUTEROL SULFATE 3 ML: .5; 3 SOLUTION RESPIRATORY (INHALATION) at 11:12

## 2025-03-29 RX ADMIN — PIPERACILLIN AND TAZOBACTAM 3.38 G: 3; .375 INJECTION, POWDER, FOR SOLUTION INTRAVENOUS at 01:03

## 2025-03-29 RX ADMIN — CHLORHEXIDINE GLUCONATE ORAL RINSE 15 ML: 1.2 SOLUTION DENTAL at 20:51

## 2025-03-29 RX ADMIN — HEPARIN SODIUM 1100 UNITS/HR: 10000 INJECTION, SOLUTION INTRAVENOUS at 22:13

## 2025-03-29 RX ADMIN — PIPERACILLIN AND TAZOBACTAM 3.38 G: 3; .375 INJECTION, POWDER, FOR SOLUTION INTRAVENOUS at 20:42

## 2025-03-29 RX ADMIN — IPRATROPIUM BROMIDE AND ALBUTEROL SULFATE 3 ML: .5; 3 SOLUTION RESPIRATORY (INHALATION) at 07:30

## 2025-03-29 RX ADMIN — ACETAMINOPHEN 325 MG: 325 TABLET, FILM COATED ORAL at 08:26

## 2025-03-29 RX ADMIN — FLUCONAZOLE 200 MG: 2 INJECTION, SOLUTION INTRAVENOUS at 18:28

## 2025-03-29 RX ADMIN — PROPOFOL 15 MCG/KG/MIN: 10 INJECTION, EMULSION INTRAVENOUS at 03:14

## 2025-03-29 RX ADMIN — INSULIN HUMAN 4 UNITS/HR: 1 INJECTION, SOLUTION INTRAVENOUS at 22:14

## 2025-03-29 ASSESSMENT — ACTIVITIES OF DAILY LIVING (ADL)
ADLS_ACUITY_SCORE: 70
ADLS_ACUITY_SCORE: 69
ADLS_ACUITY_SCORE: 70
ADLS_ACUITY_SCORE: 69
ADLS_ACUITY_SCORE: 70

## 2025-03-29 NOTE — PROGRESS NOTES
TELE ICU Progress Note          Assessment and Plan:     Marly Cannon is a 59 year old patient being cared for in the ICU for acute hypoxic hypercapnic respiratory failure, sepsis and DKA. Pertinent assessment and recommendations include:    Neurology: # Acute toxic metabolic encephalopathy  #Sedation/Analgesia  -discontinue Versed drip; transition to precedex or propofol in sedation needed  -Goal RASS 0/-1   -Daily SAT as appropriate   Cardiovascular / Hemodynamics: # Shock, likely septic   # A-fib with RVR: resolved  # Pulmonary embolism  -discontinue levophed  -Continue heparin gtt for PE  -stop amio  - recommend Mg >2  - recommend K 4.5     Pulmonary: # Acute on chronic hypoxic and hypercapnic respiratory failure   # COPD  - chronically on 2L  # Suspected RLL mucous plug  # Suspected pneumonia - Right lower lobe opacity  # PE  # Pulmonary Edema   -Sputum culture pending, sent 3/25  -Duonebs 4x daily per RT with chest physiotherapy  -Steroids for possible AECOPD  -Continue vent support; wean FiO2 as tolerated. History of COPD with chronic hypoxia on home oxygen.   Goal oxygen saturations 90-94%  -SBT when vent settings improved  - continue Heparin drip (can consider transition to oral anticoagulant)  - Metolazone started 3/27  - Bumex 0.5mg started 3/29/2025   - if net negative and tolerating diuresis... consider extubation 3/30/25   GI and Nutrition: # At risk for malnutrition  -Tolerating trickle TF, Increase enteral feeds to goal   Renal, Fluid and Electrolytes: # YADIRA  # Hypernatremia- 148 today  # Hypokalemia: resolved  -Monitor urine output   -Free water flushes with tube feeds; monitor Na  -Lyte replacement per unit routine  -recommend thiazide diuretic today (metolazone vs diuril)  -recommend discontinue IVF   Infectious Disease Sepsis likely due to pneumonia. Sputum culture pending  Continue Zosyn.  MRSA Nares Negative: ok to discontinue vanco   Endocrinology: # Euglycemic DKA in setting of T2DM  history  -Ketones normalized   -Continue on insulin gtt   ICU Checklist: CVC: present and needed  A-line: present and needed  Landrum: present and needed  DVT: heparin gtt  GI ppx: PPI    Primary Team Communication: Communicated with bedside nurse   Billing: Total critical care time spent by me, excluding procedures, was 35 minutes.      Kin Andrade MD  3/26/2025           Hospital Course and Key events       The patient remains critically ill with Acute on chronic hypoxic and hypercarbic respiratory failure, Shock likely due to sepsis, and Acute Kidney Injury. Now fluid overloaded (b/l effusions on CXR, weight up 45lbs since admission)    Current infusions reviewed    Vent  PST for a few hours this AM, 8/5 with 7.5 ETT, on 50% fio2               Medications:     I have reviewed this patient's current medications  Current Facility-Administered Medications   Medication Dose Route Frequency Provider Last Rate Last Admin    dextrose 10% infusion   Intravenous Continuous PRN Bibi Cancino MD        heparin 25,000 units in 0.45% NaCl 250 mL ANTICOAGULANT infusion  0-5,000 Units/hr Intravenous Continuous Bibi Cancino MD 11 mL/hr at 03/28/25 2210 1,100 Units/hr at 03/28/25 2210    insulin regular (MYXREDLIN) 1 unit/mL infusion  0-24 Units/hr Intravenous Continuous Bibi Cancino MD 4 mL/hr at 03/29/25 1004 4 Units/hr at 03/29/25 1004    propofol (DIPRIVAN) infusion  5-75 mcg/kg/min Intravenous Continuous Prosper Schulz MD   Stopped at 03/29/25 0553    And    Medication Instruction   Does not apply Continuous PRN Prosper Schulz MD        Patient is already receiving anticoagulation with heparin, enoxaparin (LOVENOX), warfarin (COUMADIN)  or other anticoagulant medication   Does not apply Continuous PRN Te Parish MD        [Held by provider] sodium chloride 0.45% infusion   Intravenous Continuous Prosper Schulz MD   Stopped at 03/27/25 1751          Vitals and Exam:     Temp:  [97.1  F  (36.2  C)-97.9  F (36.6  C)] 97.9  F (36.6  C)  Pulse:  [] 113  Resp:  [12-31] 25  BP: ()/(50-88) 91/59  MAP:  [62 mmHg-97 mmHg] 72 mmHg  Arterial Line BP: ()/(47-73) 90/62  FiO2 (%):  [50 %] 50 %  SpO2:  [89 %-97 %] 93 %    In: 2229 [NG/GT:1250; I.V.:506]  Out: 1800 [Urine:1800]      FiO2 (%): 50 %, Resp: 25, Inspiratory Pressure (cm H2O) (Drager Elaina): 17, Vent Mode: PS, Resp Rate (Set): 20 breaths/min, Tidal Volume (Set, mL): 450 mL, PEEP (cm H2O): 5 cmH2O, Pressure Support (cm H2O): 8 cmH2O, Resp Rate (Set): 20 breaths/min, Tidal Volume (Set, mL): 450 mL, PEEP (cm H2O): 5 cmH2O    Physical Examination:   I examined this patient remotely using the telemedicine camera in the [facility]. The patient is located at [facility] ([place])    General Appearance:   Intubated, sedated   HEENT:   Endotrachael tube is present     Respiratory:   Good patient-ventilator synchrony     Neuro:   Sedation: minimally responsive            Pertinent Data:     All pertinent labs and diagnostic studies were reviewed    Labs:  CMP  Recent Labs   Lab 03/29/25  1004 03/29/25  0900 03/29/25  0654 03/29/25  0547 03/29/25  0509 03/28/25  0401 03/28/25  0235 03/27/25  0502 03/27/25  0441 03/26/25  0606 03/26/25  0324 03/25/25  0501 03/25/25  0406 03/23/25  2218 03/23/25  2145 03/23/25  1856 03/23/25  1826 03/23/25  1500 03/23/25  1404   NA  --   --   --   --  139  --  141  --  148*  --  144  --  146*   < > 142  --  143  --  141   POTASSIUM  --   --   --   --  3.9  --  3.5  --  3.8  --  3.8   < > 3.2*   < > 5.0  --  4.0  --  4.1   CHLORIDE  --   --   --   --  110*  --  112*  --  117*  --  115*  --  114*   < > 106  --  107  --  103   CO2  --   --   --   --  18*  --  20*  --  20*  --  18*  --  17*   < > 19*  --  24  --  23   ANIONGAP  --   --   --   --  11  --  9  --  11  --  11  --  15   < > 17*  --  12  --  15   * 173* 123* 159* 155*   < > 136*   < > 123*   < > 123*   < > 162*   < > 219*    < > 157*   < > 248*   BUN  --   --   --   --  60.8*  --  59.1*  --  60.5*  --  55.1*  --  61.9*   < > 59.0*  --  56.6*  --  57.3*   CR  --   --   --   --  2.01*  --  1.95*  --  2.09*  --  1.91*  --  2.06*   < > 1.85*  --  1.91*  --  1.74*   GFRESTIMATED  --   --   --   --  28*  --  29*  --  26*  --  29*  --  27*   < > 30*  --  29*  --  33*   NORM  --   --   --   --  8.2*  --  8.1*  --  8.6*  --  8.4*  --  8.2*   < > 8.2*  --  7.9*  --  8.9   MAG  --   --   --   --  2.0  --  2.0  --   --   --   --   --  2.2  --  2.2  --   --   --   --    PHOS  --   --   --   --   --   --   --   --   --   --   --   --  3.0  --  5.3*  --  5.9*  --  5.7*   PROTTOTAL  --   --   --   --  5.0*  --  5.0*  --  5.2*  --  5.5*  --  5.8*   < >  --   --   --   --   --    ALBUMIN  --   --   --   --  1.8*  --  1.7*  --  1.7*  --  1.8*  --  2.0*   < >  --   --   --   --   --    BILITOTAL  --   --   --   --  0.3  --  0.3  --  0.3  --  0.2  --  0.2   < >  --   --   --   --   --    ALKPHOS  --   --   --   --  76  --  79  --  83  --  88  --  99   < >  --   --   --   --   --    AST  --   --   --   --  26  --  29  --  44  --  54*  --  50*   < >  --   --   --   --   --    ALT  --   --   --   --  18  --  19  --  23  --  23  --  20   < >  --   --   --   --   --     < > = values in this interval not displayed.     CBC  Recent Labs   Lab 03/29/25  0509 03/28/25  0236 03/27/25  0441 03/26/25  0324   WBC 20.5* 19.2* 17.0* 22.1*   RBC 4.56 4.49 4.70 4.80   HGB 14.0 13.9 14.1 14.6   HCT 42.1 42.1 44.6 45.6   MCV 92 94 95 95   MCH 30.7 31.0 30.0 30.4   MCHC 33.3 33.0 31.6 32.0   RDW 14.8 15.2* 15.2* 15.4*    190 190 252     INR  Recent Labs   Lab 03/23/25  1826   INR 1.29*     Arterial Blood Gas  Recent Labs   Lab 03/29/25  0505 03/28/25  0444 03/27/25  0729 03/26/25  0319   PH 7.40 7.37 7.39 7.37   PCO2 32* 35 34* 34*   PO2 69* 65* 56* 79*   HCO3 20* 20* 21 20*   O2PER 50 55 55 75     Lactic Acid   Date Value Ref Range Status   03/24/2025 1.5 0.7 - 2.0  mmol/L Final       Imaging:    TTE 3/24: LVEF 65-70%, normal RV size and function.  No significant valve pathology.

## 2025-03-29 NOTE — PLAN OF CARE
Plan of Care Reviewed With: Patient    Overall Patient Progress: Progressing.    Pt is intubated. Alert. VS and asssessments as charted. ETT 22 at lips. Vent 50% FIO2, , RR 20, PEEP 5. Pt sats low 90s most of shift. Frequent suctioning performed. Towards end of shift pt became hypertensive with systolic Bps in 180s and having more secretions. Provider was notified and prop was started at 10 mcg/kg/min. Interventions effective pt had less secretions and systolic BP came down to 110s. OG tube 57 @ lips. TF increased to 45 mLs at 1530. 250 mLs of free water flushed x2. Residuals as charted. SBT performed please see sedation vacation note. Heparin drip with Xa in therapeutic range q6hr x2 AM draws ordered. Insulin drip running between 4-6 units this shift in algorithm 4 adjusted per protocol. Bgs as charted. Landrum intact and patent with 1100 mL cloudy and yellow urine with sediment present. CVC in place infusing all three lumens with blood return noted. Art line remains in place to L wrist. CMS intact. Dressing CDI. IV ABX rocephin and diflucan. PIV to L forearm SL. T/R , oral cares, and ROM q2hrs. Bed is locked and low, frequent rounding, medical healing restraints in place.       Face to face report given with opportunity to observe patient.    Report given to KHADIJAH Pizano RN   3/28/2025  7:15 PM

## 2025-03-29 NOTE — PROGRESS NOTES
03/29/25 1600   Ventilator Settings    Vent Mode SIMV   Resp Rate (Set) 20 breaths/min   Tidal Volume (Set, mL) 450 mL   FiO2 (S)  85 %  (increased from 45%)   PEEP (cm H2O) 5 cmH2O   Pressure Support (cm H2O) 8 cmH2O     Pt suctioned via ETT and orally, pt tolerated well. 7.5 tube secured 21 cm at the gums. Ambu attached to O2 source.        Vicky Blanchard, RT

## 2025-03-29 NOTE — PROGRESS NOTES
Patient tolerated PS/CPAP for nearly four and a half hours (0736 to 1158). Patient is still calm but tele ICU recommended giving patient a break and switching her back over to AC/CMV. Patient seemed uncomfortable with the settings change so I tried her on VC/SIMV (Synchronized Intermittent Mandatory Ventilation with Volume Control). Patient is tolerating VC/SIMV well.    Settings: VC/SIMV   Vt 450, RR 20, FiO2 45%, Peep 5  PS 8, Peep 5      Vicky Blanchard, RT

## 2025-03-29 NOTE — PLAN OF CARE
Right wrist and Left wrist restraints continued 3/29/2025    Clinical Justification: Pulling lines, pulling tubes, and pulling equipment  Less Restrictive Alternative: Repositioning, Re-evaluate equipment, Disguise equipment, Pain management, Alarm  Attending Provider Notified: Yes, Attending Provider's Name: Dr. Schulz   New orders placed Yes  Length of Order: 1 Day      Hayde Jones RN

## 2025-03-29 NOTE — PROGRESS NOTES
Was called by staff that patient now requiring increasing FiO2 levels up to 80%.  Patient is in no distress lungs not any different.  RR is up somewhat vitals other wise stable. Patient awake in no distress.  Repeat chest  xray looks somewhat better. ABG 7.42/33/72/22    Will put her on some propofol to see how does.  Is diuresing rather well.

## 2025-03-29 NOTE — PHARMACY
"[facility]    Pharmacy      Antimicrobial Stewardship Note     Current antimicrobial therapy:  Anti-infectives (From now, onward)      Start     Dose/Rate Route Frequency Ordered Stop    03/25/25 1900  fluconazole (DIFLUCAN) intermittent infusion 200 mg         200 mg  100 mL/hr over 60 Minutes Intravenous EVERY 24 HOURS 03/25/25 1840      03/23/25 1000  piperacillin-tazobactam (ZOSYN) 3.375 g vial to attach to  mL bag        Note to Pharmacy: For [facility], [facility] and [facility]: For Zosyn-naive patients, use the \"Zosyn initial dose + extended infusion\" order panel.    3.375 g  over 30 Minutes Intravenous EVERY 6 HOURS 03/23/25 0907              Indication: asp PNA, candidiasis    Days of Therapy: 8 Zosyn, 6 vancomycin, 5 fluconazole     Pertinent labs:  Creatinine   Creatinine   Date Value Ref Range Status   03/29/2025 2.01 (H) 0.51 - 0.95 mg/dL Final   03/28/2025 1.95 (H) 0.51 - 0.95 mg/dL Final   03/27/2025 2.09 (H) 0.51 - 0.95 mg/dL Final   06/23/2021 1.07 (H) 0.52 - 1.04 mg/dL Final   05/10/2021 0.96 0.52 - 1.04 mg/dL Final   02/10/2021 1.07 (H) 0.52 - 1.04 mg/dL Final     WBC   WBC   Date Value Ref Range Status   11/09/2020 7.5 4.0 - 11.0 10e9/L Final   05/21/2020 9.7 4.0 - 11.0 10e9/L Final   07/01/2019 9.0 4.0 - 11.0 10e9/L Final     WBC Count   Date Value Ref Range Status   03/29/2025 20.5 (H) 4.0 - 11.0 10e3/uL Final   03/28/2025 19.2 (H) 4.0 - 11.0 10e3/uL Final   03/27/2025 17.0 (H) 4.0 - 11.0 10e3/uL Final     Procalcitonin   Procalcitonin   Date Value Ref Range Status   03/29/2025 0.66 (H) <0.50 ng/mL Final     Comment:     Interpretation and Recommendations     <0.5 ng/mL:   Systemic bacterial infection unlikely. Local bacterial infection is possible.     0.5-1.99 ng/mL:   Systemic bacterial infection possible, but various other conditions are known to induce PCT as well.     >=2.00 ng/mL:   Systemic bacterial infection likely, unless other causes are known.     Decision to start antibiotics should " not be based on procalcitonin level alone. See Procalcitonin Guidance document for more details. [email or web address]/[email or web address]/documents/adult-procalcitonin-guidance-on-ifjkqzqzfpa19292.pdf    Factors that may affect PCT levels (not all-inclusive):     - Increased PCT level           Severe trauma/burns           Invasive surgery           Cooling therapy after cardiac arrest/surgery           Treatment with agents which stimulate cytokines           Acute kidney injury           Chronic kidney disease and end stage renal disease           Acute graft vs host disease           Non-specific shock causing decreased organ perfusion and/or infarction       - Normal or unchanged PCT level           Early in infections (if low and infection is suspected, repeating in 6-12 hours is recommended)           Chronic infections (endocarditis, osteomyelitis, prosthetic device/graft infections)           Localized infections (cellulitis, wound infections, intra-abdominal abscess)     Note: PCT has not been extensively studied in pregnancy/breastfeeding, pediatrics, severe immunosuppression, and cystic fibrosis.   03/22/2025 3.23 (H) <0.50 ng/mL Final     Comment:     Interpretation and Recommendations     <0.5 ng/mL:   Systemic bacterial infection unlikely. Local bacterial infection is possible.     0.5-1.99 ng/mL:   Systemic bacterial infection possible, but various other conditions are known to induce PCT as well.     >=2.00 ng/mL:   Systemic bacterial infection likely, unless other causes are known.     Decision to start antibiotics should not be based on procalcitonin level alone. See Procalcitonin Guidance document for more details. https://[email or web address]/[email or web address]/documents/adult-procalcitonin-guidance-on-fouxoifqmhc85460.pdf    Factors that may affect PCT levels (not all-inclusive):     - Increased PCT level           Severe trauma/burns           Invasive surgery           Cooling therapy after cardiac  arrest/surgery           Treatment with agents which stimulate cytokines           Acute kidney injury           Chronic kidney disease and end stage renal disease           Acute graft vs host disease           Non-specific shock causing decreased organ perfusion and/or infarction       - Normal or unchanged PCT level           Early in infections (if low and infection is suspected, repeating in 6-12 hours is recommended)           Chronic infections (endocarditis, osteomyelitis, prosthetic device/graft infections)           Localized infections (cellulitis, wound infections, intra-abdominal abscess)     Note: PCT has not been extensively studied in pregnancy/breastfeeding, pediatrics, severe immunosuppression, and cystic fibrosis.   03/17/2025 0.11 <0.50 ng/mL Final     Comment:     Interpretation and Recommendations     <0.5 ng/mL:   Systemic bacterial infection unlikely. Local bacterial infection is possible.     0.5-1.99 ng/mL:   Systemic bacterial infection possible, but various other conditions are known to induce PCT as well.     >=2.00 ng/mL:   Systemic bacterial infection likely, unless other causes are known.     Decision to start antibiotics should not be based on procalcitonin level alone. See Procalcitonin Guidance document for more details. [email or web address]/documents/adult-procalcitonin-guidance-on-vqyejbgbutk06150.pdf    Factors that may affect PCT levels (not all-inclusive):     - Increased PCT level           Severe trauma/burns           Invasive surgery           Cooling therapy after cardiac arrest/surgery           Treatment with agents which stimulate cytokines           Acute kidney injury           Chronic kidney disease and end stage renal disease           Acute graft vs host disease           Non-specific shock causing decreased organ perfusion and/or infarction       - Normal or unchanged PCT level           Early in infections (if low and infection is suspected,  repeating in 6-12 hours is recommended)           Chronic infections (endocarditis, osteomyelitis, prosthetic device/graft infections)           Localized infections (cellulitis, wound infections, intra-abdominal abscess)     Note: PCT has not been extensively studied in pregnancy/breastfeeding, pediatrics, severe immunosuppression, and cystic fibrosis.     CRP   CRP Inflammation   Date Value Ref Range Status   07/01/2019 <2.9 0.0 - 8.0 mg/L Final       Culture Results:      Recommendations/Interventions:  1. None    Leonel Cao RP  March 29, 2025

## 2025-03-29 NOTE — PLAN OF CARE
SEDATION VACATION     Patient on ventilator with sedation.     Is Patient a candidate for sedation vacation Yes   If no, reason why not completed:NA    Daily sedation interruption completed. Yes, pt has been off sedation since yesterday @ 1105 am.   Time of sedation interruption pt off sedation since 1105 am on 3/27/2025 until 1846 on 3/28/2025.  How patient tolerated interruption: Pt off sedation for approximately 32 hours. SBT performed @ 11am. Sats remained around 90% on FIO2 of 50% and PEEP of 5. At times she would get slightly hypertensive and experienced some gagging. She opened her eyes and looked at me but would not do any finger tracking. HR remained stable SR 80s with occasional PACs. RR 23. Provider was notified and nurse instructed to have RT place back on AC mode approximately around 2:10pm. Later in evening towards 6pm pt again became hypertensive with moderate amount of thick white secretions, and gagging on tube. CPOT of 5 and Rass of 1. Provider was notified and propofol was started at minimum dose of 10 mcg/kg/min.

## 2025-03-29 NOTE — PLAN OF CARE
Plan of Care Reviewed With: Patient    Overall Patient Progress:Progressing       Pt remains intubated. Drowsy, opens eyes spontaneously. Propofol infusion stopped at 0550. Tolerating vent. ETT 22cm at the gums. OG remains at 57cm at gums, TF increased this shift at 2330. Infusing at 50mLs. Free water per order. CVC intact Heparin gtt continues per MAR. Insulin gtt adjusted per algorithm. Art line to left wrist remains in place. CMS intact. Oral cares and repositioning q2hrs. Frequent rounding.      Face to face report given with opportunity to observe patient.    Report given to KHADIJAH Lock RN   3/29/2025  9:01 AM

## 2025-03-29 NOTE — PROGRESS NOTES
[facility]    Hospitalist Progress Note      59 years old female with a past medical history of hypertension, diabetes mellitus type 2, pulmonary emphysema, intellectual disability with memory issues, hyperparathyroidism and multiple other medical issues was brought to the emergency room for high blood sugars with shortness of breath. Complains of pain generalized symptoms in the chest but difficult to clarify. Not a good historian. Most of the history I also has been obtained from chart/caregivers. In the ED was noted to be tachycardic in the 130s and a subsequent EKG was concerning for SVT. Repeat EKG had shown A-fib with rapid ventricular response. COVID influenza and RSV is negative. CBC showed a white count of 16.4 with a hemoglobin of 16.8 and a platelet count of 260. CMP showed sodium 134 potassium 4.5 BUN of 48.1 with a creatinine of 1.32. AST/ALT was 21/10. VBG showed a pH of 1.39 with a pCO2 of 38 and a bicarb of 23. Lactic acid is 1.8. Ketones was 1.89. Follow-up CT chest shows a small pulmonary artery embolism to the subsegmental branches of the left lower lobe with no right heart strain. Patient was initiated on therapeutic Lovenox in addition to insulin drip for possible diabetic ketoacidosis. Cardizem infusion was initiated for SVT/A-fib. She was admitted for further evaluation.         Assessment & Plan  Marly Cannon is a 59 year old female who was admitted on 3/22/2025.      Acute hypoxic/hypercapnic respiratory failure: Patient intubated afternoon of 3/23.  Etiology uncertain, however possibility includes PE, aspiration, pneumonia, versus bad COPD.  CTA chest on admission showed clear lungs, small subsegmental PE.  Placed on therapeutic Lovenox at that time.  Patient is subsequently required BiPAP, then intubation over the course of 8 to 10 hours.  Serial chest x-rays have been stable/clear.  Worst ABG showed pH of 7.05 with pCO2 103, suggesting retention.COPD exacerbation?  -3/24:  Intubated, sedated on 3/23.  Ventilator settings with FiO2 of 70%, PEEP of 5.  Latest ABG pH 7.34, pCO2 41, pO2 of 85..  Repeat CTA chest shows progression of right lower lobe pulmonary embolism, now involving the segmental as well as a subsegmental arteries.  There is collapse of the entire right lower lobe with opacification of bronchi within the right lower lobe, possible mucous plugging from aspiration of contents?  Continuing Zosyn  -3/25: Remains intubated, sedated.  Ventilator settings with FiO2 at 60%, PEEP of 5.  ABG with pH of 7.33.  Chest x-ray stable, persistent collapsed middle lobe.  On Zosyn, possible aspiration versus mucous plug.  Chest physiotherapy ordered by telemetry ICU.  -3/26: Patient remains on the ventilator.  ABG with pH 7.37 pCO2 34 pO2 79 bicarb 20.  Sats been running 96+ percent.  She is on 80% FiO2.  Repeat chest x-ray no significant changes.  Still has what appears to be right lower lobe collapse.  Clear lungs on exam.  He is getting significant amount of IV fluid over the last 24 hours of most 5 L.  No significant wheezing.  Will try and titrate down on the FiO2.  Somewhat surprising requires so much oxygen at this point.  His lung fields specially left lung is relatively clear.  No significant secretions.  Sedated with the Versed and fentanyl.  Continue with aggressive pulmonary toilet.  Wean FiO2.  Is on antibiotics for potential aspiration.  Getting IV steroids every 6 hours will cut back on the dosing of this also.  -3/27: Patient remains intubated.  Her oxygen requirements have been decreased down to 55% FiO2.  Arterial blood gas has a pH of 7.39 pCO2 34 pO2 56.  Chest x-ray still shows to the right lower lobe probable collapse.  Some mild pleural effusions bilaterally.  He is only on fentanyl currently for sedation.  Treating for pneumonia and her pulmonary embolism.  Chronically is on 2 L of O2.  -3/28: Reviewed patient's chart.  As an outpatient she does have a significant  history of fairly severe COPD.  She has had variable PFTs performed which all have showed obstructive lung disease but variable DLCO was due to poor patient cooperation with the studies.  There was also concern of a fixed obstruction however evaluation by ENT and CT scans were not able to document any of this finding.  She also does have severe obstructive sleep apnea with severe sleep associated hypoxemia they recommended BiPAP however patient was unable to tolerate this and refused any further intervention or trials.  Currently patient remains intubated.  ABG still looks good pH 7.37 pCO2 35 pO2 65.  Currently is on assist-control rate of 20 tidal volume 400 PEEP of 8 she is still on 55% FiO2.  Sats been fine greater than 90%.  She was on CPAP for well over 3 to 4 hours yesterday and tolerated it without issue.  Chest x-ray shows probably pleural effusions.  Minimal secretions.  Still have her on bronchodilators some steroids.  Minimal sedation.  Have some concern regarding this.  Will evaluate neurostatus further here today.  Pulmonary embolism be treated with heparin.  Does have fairly severe underlying COPD.  Also fairly severe sleep apnea.  She is very alert and appropriate this morning.  Down to 50% FiO2.  Still on PEEP of 8.  Will try to put her on CPAP again here this morning for a while and see how she does.  Not quite sure she is ready for attempted extubation.  Will see how she does and discuss further with tele-ICU.  -3/29: Remains intubated ABG looks good is able to tolerate CPAP without difficulty.  Still is on 50% FiO2.  Will contact telemetry ICU regarding their thoughts regarding an attempt at extubation.  She was very alert actually breathing over her current vent settings she was placed on CPAP by respiratory therapy.  At this point considering extubation at some point this morning will just run up by tele ICU.        Suspected pneumonia: initial CTA chest shows clear lungs, however patient  tachypneic, increased work of breathing.  Oxygen requirement likely due to COPD.  Repeat CTA findings as above.  -Continuing Zosyn  -Continue to monitor respiratory status on ventilator  -3/26: Currently is on Zosyn/vancomycin.  MRSA/SA swab negative.  White count is 22,000.  No major fevers.  Blood cultures remain negative.  Minimal secretions.  -3/28: Have discontinued vancomycin.  Patient remains on Zosyn.  Blood cultures negative.  A sputum sample was obtained couple days ago does show 3+ yeast and 2+ gram-positive cocci in pairs culture is in process.  Remains afebrile.  White count still elevated 19,000.  -3/29: Patient patric on Zosyn.  Yeast growing out of her sputum she is on fluconazole.  X-ray shows what appears to be increasing pleural effusions in all likelihood.  She did have the right middle/lower lobe collapse.     Shock: Uncertain etiology.  Will need to rule out obstructive shock with CTA.  Patient is now on moderate amount of Levophed.  She has been adequately volume resuscitated per sepsis protocol.  -3/24: Maintenance fluids.  Art line inserted, will titrate pressors to MAP of 60.  Echocardiogram ordered.  WBC 15.5, Tmax 99.7 in last 24 hours.  Urinalysis negative for significant pyuria.  Initial CTA on admission showed clear lungs.  CTA without massive saddle embolus, CT abdomen pelvis with dilated gallbladder, but no biliary dilatation, no obvious obstruction.  No thickened gallbladder wall, no fluid collection.  CT also shows subcutaneous edema in the lower anterior abdominal wall and into the right labia.  Possible cellulitic infection?  Will continue IV Zosyn.  Adding vancomycin  -3/25: Patient with persistent vasopressor requirement, 0.08 Levophed.  Turned up periodically to 0.11.  Possible source of infections include aspiration pneumonia, cellulitis of lower abdominal wall/labia, gallbladder.  LFTs have remained unremarkable.  Continuing Zosyn/vancomycin  -3/26: Blood pressures have been  greater than 100 systolic.  He is on 0.05 of Levophed.  Will continue to titrate this down his tolerated.  Has received significant IV fluids at this point.  Urine output has been on the somewhat oliguric side.  Echocardiogram was a very technically difficult study the LV function was normal however.  Right ventricle did not appear to be grossly dilated.  -3/27: Patient had what appeared to be started a supraventricular rhythm throughout yesterday.  She reverted back to sinus rhythm around 8 PM at that time her pressures did come up significantly.  She is no longer requiring any pressors.  -3/28: Patient patric off pressors.  Blood pressures been greater than 100 systolic.  Heart rates been in the 70s in sinus rhythm.  She is very sensitive to her atrial dysrhythmias.  Once she converted back to sinus rhythm she immediately had significant improvement in her blood pressures.  Echocardiogram has shown normal systolic function.  She is off the amiodarone.  Volume wise she probably is volume up in terms of the amount of IV fluid she is received.  Weights have shown significant weight gain over her stay she was 90.4 kg 107.8 yesterday.  -3/29 resolved     Encephalopathy: Toxic versus metabolic.  Patient is intubated and sedated.  -3/24: CT of the head negative for acute pathology.  -3/26: Patient sedated at this point difficult to really assess her mental status.  -3/28: Patient's morning does open her eyes to voice.  Follows commands appropriately.  Nods yes and no.  Squeezes hands moves toes.  She is on no sedation at this time.  -3/29: Alert following commands.     Pulmonary embolism: Subsegmental with no right heart strain at least on CT.  Patient given therapeutic Lovenox in the emergency room.  -Echocardiogram ordered  -3/24: Currently on Lovenox twice daily.  Given worsening renal function, will switch her to heparin  -3/25: Continuing on heparin gtt  -3/26: PE on heparin drip.  -3/27: Continues on the heparin  infusion.  -3/28: At some point we will switch over to oral anticoagulant  -3/29: IV heparin     CVS:  Patient is initial EKG with A-fib RVR with heart rate in the 150s.  Patient received diltiazem, now she is converted.  She was on p.o. diltiazem 60 mg every 8 hours scheduled, as well as a diltiazem drip.  This was stopped due to hypotension.  Patient has also had self-limited runs of SVT.  -3/24: Shock requiring pressors.  Patient currently normal sinus rhythm but rate is tachy at 120.  Echocardiogram is ordered.  Given hypotension, cannot use calcium channel blocker or beta-blocker.  EKG shows SVT versus aflutter.  Will start her on amiodarone drip.  Continuing anticoagulation with heparin gtt  -3/25: Patient remains on Levophed, amiodarone.  Heart rate better controlled in the 1 teens to 120s, but still appears to be SVT/aflutter.  Continues on heparin drip as well.  Source of shock uncertain.  Echocardiogram official read with ejection fraction of 65 to 70%, hyperdynamic LV, no significant valvular or structural abnormalities.  -3/26: Heart rate rate around 115.  Is regular.  Cannot really tell on telemetry if this is not a flutter or sinus tachycardia will obtain twelve-lead EKG.  He is on amiodarone as apparently did have SVT and A-fib earlier.  -3/27: Patient did revert to sinus rhythm last night heart rates have been now in the 60-70 range.  Amiodarone was discontinued.  -3/28: As above she does not seem to tolerate any type of atrial dysrhythmia.  Does get rather hypotensive.  She is been in sinus rhythm with blood pressures doing very well anywhere from 105-100 30s systolic range.  Art line is in place.  Likely will be able to discontinue this in the next 24 hours.  Site still looks okay.  -3/29: Patient been off pressors for couple days.  Blood pressure has been right around 100-105 systolic range heart rate this morning is gone up a little bit to 100.  Remains sinus rhythm.     Uncontrolled diabetes  mellitus type 2, insulin-dependent: This is despite insulin pump.  Patient had functioning insulin pump on 3/20/2025 when she was discharged from this facility.  Anion gap is 17, but blood glucoses have been in the 200s.  Serum ketones resolved for a time, but now have returned.  Ketones uptrending  -3/23: Insulin pump removed.  Was given long-acting insulin 20 units of Toujeo this a.m., ketones continuing to uptrend.  Will place her back on insulin drip at this time.  -3/24: Patient on insulin drip.  Shutting off periodically for euglycemia.  -3/25: Patient intermittently on insulin drip.  Bicarb 17, anion gap closed at 15.  Holding subcu insulin in favor of insulin drip at this time  -3/26: Is on the insulin infusion at this point.  Sugars are appropriate.  He is getting tube feedings at goal rate.  -3/27: Remains on insulin infusion sugars have been fine 120 range.  -3/28: May consider subcu transition in the next day or so also.  She remains on tube feedings and tolerating them well.  -3/29: Patient is on insulin infusion.  Blood sugars have been 1 30-1 50 range insulin titrated.  He is getting the tube feedings.     YADIRA: Creatinine rising since admission, now 1.64.  Previously slightly above 1, on presentation was 1.3.  Patient did receive contrast.  -3/24: Creatinine 1.91.  Maintaining IV fluids.  Monitoring renal function and urine output  -3/25: Creatinine climbing to 2.06 despite IV fluids.  Patient did receive IV contrast, possible contrast nephropathy.  Urine output has been adequate.  -3/26: Somewhat oliguric.  Urine output only 1 L over the last 24 hours.  A total of 5163 cc in.  BUN/creatinine are slightly improved to 55/1.91.  Potassium 3.8 sodium is 144.  Minimal lower extremity edema.  At some point may require at least some Lasix.  -3/27: Still somewhat oliguric.  Creatinine has jumped up somewhat today to 2.09 BUN 60.5 sodium 148.  Does need increasing free water.  Will administer this via her  gastric tube.  Also change to half-normal saline.  Landrum catheter is in place.  Repeat UA urine sodium and creatinine.  -3/28: Urine output is picking up a bit.  Was started on metolazone recommended by tele ICU her creatinine is no longer rising.  Is down to 1.95.  Potassium is 3.5.  Magnesium was 2.0.  Serum sodium is also improved.  Have been giving increased free water via her oral gastric tube.  -3/29: Urine output has picked up.  With 800 cc out yesterday Landrum catheter is in place.  Total intake was 1700.  Creatinine still remains right around 2.  BUN in the 60 range.  The potassium is 3.9 sodium is 139.  Is not getting any current IV fluids.    COPD: Oxygen dependence established last admission.  Patient was discharged on 2 L nasal cannula after last admission..  Also has a history of severe obstructive sleep apnea with severe nocturnal hypoxia.  Declined BiPAP which is recommended by the sleep study group.     FEN: Patient is on tube feedings rate of 60 cc an hour tolerating them well..      Diet: NPO for Medical/Clinical Reasons Except for: No Exceptions  Adult Formula Drip Feeding: Specified Time Osmolite 1.5; Orogastric tube; Goal Rate: 60; mL/hr; Keep at 10ml/hr for 24 hours. After 24 hours advance by 5ml/hr Q8H.  Fluids: None    DVT Prophylaxis: IV heparin  Code Status: Full Code    Disposition: Expected discharge in 3-5 once extubated d     Prosper Schulz MD    Interval History   Came into the room patient was actually awake interactive.  Hemodynamically stable.  Afebrile on the vent assist-control actually breathing above it.  Was put on CPAP following commands.  Hemodynamically has been stable no fevers at all.  Still some renal insufficiency.  X-ray does show some pleural effusions likely bilaterally.  Not a lot of secretions at this point.  Wondering if we might not consider an attempt at extubation.  Will see if tele ICU agrees or not.      -Data reviewed today: I reviewed all new labs and  imaging results over the last 24 hours. I personally reviewed the chest x-ray image(s) showing shows probably bilateral pleural effusions.  Tubes in appropriate position. .    Physical Exam   Temp: 97.2  F (36.2  C) Temp src: Tympanic BP: 103/78 Pulse: 112   Resp: 30 SpO2: (!) 82 % O2 Device: Mechanical Ventilator    Vitals:    03/26/25 0622 03/27/25 0441 03/29/25 0553   Weight: 102.1 kg (225 lb 1.4 oz) 107.8 kg (237 lb 10.5 oz) 112.3 kg (247 lb 9.2 oz)     Vital Signs with Ranges  Temp:  [97.1  F (36.2  C)-97.5  F (36.4  C)] 97.2  F (36.2  C)  Pulse:  [] 112  Resp:  [12-31] 30  BP: ()/(50-88) 103/78  MAP:  [62 mmHg-97 mmHg] 78 mmHg  Arterial Line BP: ()/(45-73) 106/67  FiO2 (%):  [50 %] 50 %  SpO2:  [82 %-97 %] 82 %  I/O last 3 completed shifts:  In: 1979 [I.V.:506; NG/GT:1000]  Out: 1800 [Urine:1800]    Peripheral IV 03/25/25 Anterior;Left Lower forearm (Active)   Site Assessment [facility] 03/29/25 0624   Line Status Saline locked 03/29/25 0624   Dressing Transparent 03/29/25 0624   Dressing Status clean;dry;intact 03/28/25 2000   Line Intervention Flushed 03/28/25 1830   Line Necessity Yes, meets criteria 03/29/25 0624   Phlebitis Scale 0-->no symptoms 03/29/25 0624   Infiltration? no 03/29/25 0624   Number of days: 4       Arterial Line 03/23/25 Radial (Active)   Site Assessment [facility] 03/29/25 0624   Line Status Pulsatile blood flow 03/29/25 0624   Art Line Waveform Appropriate 03/29/25 0624   Art Line Interventions Zeroed and calibrated;Connections checked and tightened 03/29/25 0624   Color/Movement/Sensation Capillary refill less than 3 sec 03/29/25 0624   Line Necessity Yes, meets criteria 03/29/25 0624   Dressing Type Transparent 03/29/25 0624   Dressing Status Clean, dry, intact 03/29/25 0624   Dressing Intervention Dressing changed/new dressing 03/27/25 2200   Number of days: 6       CVC Triple Lumen Right Internal jugular (Active)   Site Assessment WDL 03/29/25 0624   Dressing Chlorhexidine  disk;Transparent 03/29/25 0624   Dressing Status clean;dry;intact 03/28/25 2000   Dressing Intervention dressing changed 03/29/25 0624   Line Necessity Yes, meets criteria 03/29/25 0624   Blue - Status infusing 03/29/25 0624   Blue - Intervention Flushed 03/28/25 1100   Brown - Status infusing 03/29/25 0624   Brown - Intervention Flushed 03/27/25 0300   White - Status infusing 03/29/25 0624   White - Intervention Flushed 03/25/25 0800   Phlebitis Scale 0-->no symptoms 03/29/25 0624   Infiltration? no 03/29/25 0624   Number of days: 6       ETT Cuffed 7.5 mm (Active)   Secured at (cm) 21 cm 03/29/25 0731   Measured from Teeth/Gums 03/29/25 0731   Position Right 03/29/25 0731   Secured by Commercial tube haque 03/29/25 0731   Bite Block Included with Commercial tube haque 03/29/25 0731   Site Appearance Clean;Dry 03/29/25 0318   Tube Care Site care done 03/26/25 1616   Cuff Assessment Cuff Pressure 03/29/25 0731   Cuff Pressure - cm H2O 28 cmH20 03/29/25 0731   Safety Measures Manual resuscitator at bedside 03/29/25 0731   Number of days: 6       GI Enteral Access Device Mouth, center Gastric 18 fr (Active)   Status Feeding 03/29/25 0000   Placement Confirmation Key [place] unchanged 03/29/25 0000   Surrounding Skin Assessment WDL 03/29/25 0000   Insertion Site Assessment WDL 03/29/25 0000   Key [place] (cm marking) at nare/mouth 57 cm 03/29/25 0000   Key Center (visual) Key Center at entry site (nare, mouth, etc.) 03/29/25 0000   Securement Device Tape 03/29/25 0000   Drainage Tan 03/29/25 0000   Intake (mL) 72 ml 03/27/25 1735   Flush/Free Water (mL) 250 mL 03/29/25 0000   Residual (mL) 200 mL 03/29/25 0000   Output (mL) 500 ml 03/25/25 0600   Number of days: 6       Urinary Drain 03/23/25 Urethral Catheter (Active)   Tube Description Positional 03/29/25 0000   Catheter Care Catheter wipes 03/29/25 0000   Perineal Skin Assessment Erythema 03/29/25 0000   Collection Container Standard 03/29/25 0000   Securement Method  Commercial securement device 03/29/25 0000   Rationale for Continued Use ICU only: hourly urine output needed for patient care 03/29/25 0000   Urine Output 100 mL 03/29/25 0600   Number of days: 6     Central line is in place.  Left art line likely will get rid of that this weekend.    Constitutional: Alert and oriented x3. No distress    HEENT: Normocephalic/atraumatic, PERRL, EOMI, mouth oral gastric tube and ET tube in place.  Right neck IJ central line, neck supple, no LN.     Cardiovascular: RRR.   Murmur, no  rubs, or gallops.  JVD unable. .  Pulses 2    Respiratory: Few scattered rhonchi anterior on the right.  Decreased posteriorly clear to auscultation bilaterally    Abdomen: Soft, nontender, nondistended, no organomegaly. Bowel sounds present    Extremities: Warm/dry.  1-2+ edema    Neuro:   Non focal, cranial nerves intact, Moves all extremities.  Medications   Current Facility-Administered Medications   Medication Dose Route Frequency Provider Last Rate Last Admin    dextrose 10% infusion   Intravenous Continuous PRN Bibi Cancino MD        heparin 25,000 units in 0.45% NaCl 250 mL ANTICOAGULANT infusion  0-5,000 Units/hr Intravenous Continuous Bibi Cancino MD 11 mL/hr at 03/28/25 2210 1,100 Units/hr at 03/28/25 2210    insulin regular (MYXREDLIN) 1 unit/mL infusion  0-24 Units/hr Intravenous Continuous Bibi Cancino MD 2 mL/hr at 03/29/25 0650 2 Units/hr at 03/29/25 0650    propofol (DIPRIVAN) infusion  5-75 mcg/kg/min Intravenous Continuous Prosper Schulz MD   Stopped at 03/29/25 0553    And    Medication Instruction   Does not apply Continuous PRN Prosper Schulz MD        Patient is already receiving anticoagulation with heparin, enoxaparin (LOVENOX), warfarin (COUMADIN)  or other anticoagulant medication   Does not apply Continuous PRN Te Parish MD        [Held by provider] sodium chloride 0.45% infusion   Intravenous Continuous Prosper Schulz MD   Stopped at 03/27/25  1751     Current Facility-Administered Medications   Medication Dose Route Frequency Provider Last Rate Last Admin    [Held by provider] aspirin (ASA) chewable tablet 81 mg  81 mg Oral Daily Te Parish MD   81 mg at 03/24/25 0948    [Held by provider] atorvastatin (LIPITOR) tablet 40 mg  40 mg Oral At Bedtime Te Parish MD   40 mg at 03/23/25 2301    chlorhexidine (PERIDEX) 0.12 % solution 15 mL  15 mL Mouth/Throat Q12H Gentry Saez DO   15 mL at 03/29/25 0827    [Held by provider] empagliflozin (JARDIANCE) tablet 25 mg  25 mg Oral Daily Te Parish MD   25 mg at 03/24/25 0948    fluconazole (DIFLUCAN) intermittent infusion 200 mg  200 mg Intravenous Q24H Bibi Cancino  mL/hr at 03/28/25 2032 200 mg at 03/28/25 2032    [Held by provider] insulin glargine U-300 (TOUJEO) injection 20 Units  20 Units Subcutaneous QAM Te Parish MD   20 Units at 03/23/25 0911    ipratropium - albuterol 0.5 mg/2.5 mg/3 mL (DUONEB) neb solution 3 mL  3 mL Nebulization 4x daily Delmy Harris MD   3 mL at 03/29/25 0730    metolazone (ZAROXOLYN) tablet 5 mg  5 mg Per OG Tube Daily Prosper Schulz MD   5 mg at 03/28/25 0845    pantoprazole (PROTONIX) 2 mg/mL suspension 40 mg  40 mg Per Feeding Tube QAM  Gentry Saez DO        Or    pantoprazole (PROTONIX) IV push injection 40 mg  40 mg Intravenous QA Gentry Peng DO   40 mg at 03/29/25 0827    piperacillin-tazobactam (ZOSYN) 3.375 g vial to attach to  mL bag  3.375 g Intravenous Q6H Bibi Cancino MD   3.375 g at 03/29/25 0826    primidone (MYSOLINE) tablet 50 mg  50 mg Oral or Feeding Tube At Bedtime Bibi Cancino MD   50 mg at 03/28/25 2306    Vitamin D3 (CHOLECALCIFEROL) tablet 25 mcg  25 mcg Oral or Feeding Tube Daily Bibi Cancino MD   25 mcg at 03/29/25 0826       Data   Recent Labs   Lab 03/29/25  0654 03/29/25  0547 03/29/25  0509 03/28/25  0401 03/28/25  0236  03/28/25  0235 03/27/25  0502 03/27/25  0441 03/23/25  1856 03/23/25  1826   WBC  --   --  20.5*  --  19.2*  --   --  17.0*   < >  --    HGB  --   --  14.0  --  13.9  --   --  14.1   < >  --    MCV  --   --  92  --  94  --   --  95   < >  --    PLT  --   --  217  --  190  --   --  190   < >  --    INR  --   --   --   --   --   --   --   --   --  1.29*   NA  --   --  139  --   --  141  --  148*   < > 143   POTASSIUM  --   --  3.9  --   --  3.5  --  3.8   < > 4.0   CHLORIDE  --   --  110*  --   --  112*  --  117*   < > 107   CO2  --   --  18*  --   --  20*  --  20*   < > 24   BUN  --   --  60.8*  --   --  59.1*  --  60.5*   < > 56.6*   CR  --   --  2.01*  --   --  1.95*  --  2.09*   < > 1.91*   ANIONGAP  --   --  11  --   --  9  --  11   < > 12   NORM  --   --  8.2*  --   --  8.1*  --  8.6*   < > 7.9*   * 159* 155*   < >  --  136*   < > 123*   < > 157*   ALBUMIN  --   --  1.8*  --   --  1.7*  --  1.7*   < >  --    PROTTOTAL  --   --  5.0*  --   --  5.0*  --  5.2*   < >  --    BILITOTAL  --   --  0.3  --   --  0.3  --  0.3   < >  --    ALKPHOS  --   --  76  --   --  79  --  83   < >  --    ALT  --   --  18  --   --  19  --  23   < >  --    AST  --   --  26  --   --  29  --  44   < >  --     < > = values in this interval not displayed.       Recent Results (from the past 24 hours)   XR Chest Port 1 View    Narrative    EXAM: XR CHEST PORT 1 VIEW  LOCATION: [facility]  DATE: 3/29/2025    INDICATION: intubated  COMPARISON: 03/28/2025      Impression    IMPRESSION: Endotracheal tube 3.5 cm above carlos. Enteric tube below lower aspect of film. Right IJ line tip SVC. Stable cardiomediastinal silhouette. Bibasilar atelectasis or infiltrate and pleural effusions similar.

## 2025-03-29 NOTE — PROGRESS NOTES
SBT/Wean Start Time: 0736   Settings: Pressure Support 8, Peep 5, FiO2 50%  Patient Spontaneous Effort: RR 23, Vt 480, Ve 10.1, etCO2 22  Patient Vitals: /68, , SpO2 95%      15 minutes into wean: 0750  Patient Spontaneous Effort:RR 23, Vt 458, Ve 9.55, EtCO2 21  Patient Vitals: /63, , SpO2 94%      1 hour into wean: 0836  Patient Spontaneous Effort:RR 22m Vt 484, Ve 9.76, EtCO2 20  Patient Vitals: /78, , SpO2 92%    Patient tolerating well      Vicky Blanchard, RT

## 2025-03-29 NOTE — PLAN OF CARE
Pt continues to be intubated on no sedation this shift until 1832 propofol was started due to oxygen requirements and RR increasing. Blood gas and chest xray done shortly before that and both showed improvement. HR has been 80-110s this shift. BP maintaining MAPS above 60. Vent settings at this time are SIMV, 85% fiO2, RR 20, , Pressure support 8, and PEEP of 5. ET tube is 21 at the gums. Denies pain. Only complaint was of discomfort in her buttocks which improved with repositioning. CVC has blood return noted on all 3 lumens. Brown lumen is saline locked. Blue lumen has insulin, prop, and tko infusing. White lumen has Heparin and TKO. Heparin gtt not titrated this shift, next lab draw is in the a.m. Landrum catheter in place with adequate out put. IV bumex and metolazone given through OG. OG is 57 at the lips. Residuals as documented. 250 mL of free water given q4h. Osmolite 1.5 is infusing at 60 mL/hr at this time which is her goal rate. ART line zeroed as documented. Pt continues to be restrained. Tele ICU contacted early in the shift for thought on extubation. They recommended further diuresis, reassess tomorrow, and possible extubation tomorrow.    Face to face report given with opportunity to observe patient.    Report given to Tamara Jones RN   3/29/2025  7:06 PM

## 2025-03-30 ENCOUNTER — APPOINTMENT (OUTPATIENT)
Dept: GENERAL RADIOLOGY | Facility: HOSPITAL | Age: 60
DRG: 870 | End: 2025-03-30
Attending: INTERNAL MEDICINE
Payer: COMMERCIAL

## 2025-03-30 LAB
ALBUMIN SERPL BCG-MCNC: 1.7 G/DL (ref 3.5–5.2)
ALLEN'S TEST: ABNORMAL
ALP SERPL-CCNC: 74 U/L (ref 40–150)
ALT SERPL W P-5'-P-CCNC: 17 U/L (ref 0–50)
ANION GAP SERPL CALCULATED.3IONS-SCNC: 12 MMOL/L (ref 7–15)
AST SERPL W P-5'-P-CCNC: 24 U/L (ref 0–45)
BASE EXCESS BLDA CALC-SCNC: -0.5 MMOL/L (ref -3–3)
BILIRUB SERPL-MCNC: 0.4 MG/DL
BUN SERPL-MCNC: 61.6 MG/DL (ref 8–23)
CALCIUM SERPL-MCNC: 8 MG/DL (ref 8.8–10.4)
CHLORIDE SERPL-SCNC: 109 MMOL/L (ref 98–107)
CREAT SERPL-MCNC: 2.09 MG/DL (ref 0.51–0.95)
EGFRCR SERPLBLD CKD-EPI 2021: 26 ML/MIN/1.73M2
ERYTHROCYTE [DISTWIDTH] IN BLOOD BY AUTOMATED COUNT: 14.8 % (ref 10–15)
GLUCOSE BLDC GLUCOMTR-MCNC: 105 MG/DL (ref 70–99)
GLUCOSE BLDC GLUCOMTR-MCNC: 108 MG/DL (ref 70–99)
GLUCOSE BLDC GLUCOMTR-MCNC: 108 MG/DL (ref 70–99)
GLUCOSE BLDC GLUCOMTR-MCNC: 109 MG/DL (ref 70–99)
GLUCOSE BLDC GLUCOMTR-MCNC: 119 MG/DL (ref 70–99)
GLUCOSE BLDC GLUCOMTR-MCNC: 135 MG/DL (ref 70–99)
GLUCOSE BLDC GLUCOMTR-MCNC: 137 MG/DL (ref 70–99)
GLUCOSE BLDC GLUCOMTR-MCNC: 138 MG/DL (ref 70–99)
GLUCOSE BLDC GLUCOMTR-MCNC: 162 MG/DL (ref 70–99)
GLUCOSE BLDC GLUCOMTR-MCNC: 98 MG/DL (ref 70–99)
GLUCOSE SERPL-MCNC: 138 MG/DL (ref 70–99)
HCO3 BLD-SCNC: 24 MMOL/L (ref 21–28)
HCO3 SERPL-SCNC: 21 MMOL/L (ref 22–29)
HCT VFR BLD AUTO: 38.7 % (ref 35–47)
HGB BLD-MCNC: 12.8 G/DL (ref 11.7–15.7)
HOLD SPECIMEN: NORMAL
MAGNESIUM SERPL-MCNC: 1.9 MG/DL (ref 1.7–2.3)
MCH RBC QN AUTO: 30.7 PG (ref 26.5–33)
MCHC RBC AUTO-ENTMCNC: 33.1 G/DL (ref 31.5–36.5)
MCV RBC AUTO: 93 FL (ref 78–100)
O2/TOTAL GAS SETTING VFR VENT: 65 %
OXYHGB MFR BLDA: 91 % (ref 92–100)
PCO2 BLD: 36 MM HG (ref 35–45)
PEEP: 5 CM H2O
PH BLD: 7.42 [PH] (ref 7.35–7.45)
PLATELET # BLD AUTO: 234 10E3/UL (ref 150–450)
PO2 BLD: 61 MM HG (ref 80–105)
POTASSIUM SERPL-SCNC: 4.2 MMOL/L (ref 3.4–5.3)
PROT SERPL-MCNC: 4.7 G/DL (ref 6.4–8.3)
RBC # BLD AUTO: 4.17 10E6/UL (ref 3.8–5.2)
SAO2 % BLDA: 92 % (ref 96–97)
SODIUM SERPL-SCNC: 142 MMOL/L (ref 135–145)
UFH PPP CHRO-ACNC: 0.29 IU/ML
UFH PPP CHRO-ACNC: 0.37 IU/ML
UFH PPP CHRO-ACNC: 0.72 IU/ML
WBC # BLD AUTO: 16.3 10E3/UL (ref 4–11)

## 2025-03-30 PROCEDURE — 94003 VENT MGMT INPAT SUBQ DAY: CPT

## 2025-03-30 PROCEDURE — 85520 HEPARIN ASSAY: CPT | Performed by: INTERNAL MEDICINE

## 2025-03-30 PROCEDURE — 94668 MNPJ CHEST WALL SBSQ: CPT

## 2025-03-30 PROCEDURE — 85018 HEMOGLOBIN: CPT | Performed by: INTERNAL MEDICINE

## 2025-03-30 PROCEDURE — 83735 ASSAY OF MAGNESIUM: CPT | Performed by: INTERNAL MEDICINE

## 2025-03-30 PROCEDURE — 250N000011 HC RX IP 250 OP 636: Performed by: INTERNAL MEDICINE

## 2025-03-30 PROCEDURE — 200N000001 HC R&B ICU

## 2025-03-30 PROCEDURE — 36600 WITHDRAWAL OF ARTERIAL BLOOD: CPT

## 2025-03-30 PROCEDURE — 82805 BLOOD GASES W/O2 SATURATION: CPT | Performed by: INTERNAL MEDICINE

## 2025-03-30 PROCEDURE — 999N000157 HC STATISTIC RCP TIME EA 10 MIN

## 2025-03-30 PROCEDURE — 36592 COLLECT BLOOD FROM PICC: CPT | Performed by: INTERNAL MEDICINE

## 2025-03-30 PROCEDURE — 99232 SBSQ HOSP IP/OBS MODERATE 35: CPT | Performed by: INTERNAL MEDICINE

## 2025-03-30 PROCEDURE — 94640 AIRWAY INHALATION TREATMENT: CPT | Mod: 76

## 2025-03-30 PROCEDURE — 71045 X-RAY EXAM CHEST 1 VIEW: CPT

## 2025-03-30 PROCEDURE — 250N000013 HC RX MED GY IP 250 OP 250 PS 637: Performed by: INTERNAL MEDICINE

## 2025-03-30 PROCEDURE — 94640 AIRWAY INHALATION TREATMENT: CPT

## 2025-03-30 PROCEDURE — 94667 MNPJ CHEST WALL 1ST: CPT

## 2025-03-30 PROCEDURE — 250N000009 HC RX 250: Performed by: INTERNAL MEDICINE

## 2025-03-30 PROCEDURE — 84155 ASSAY OF PROTEIN SERUM: CPT | Performed by: INTERNAL MEDICINE

## 2025-03-30 RX ORDER — BUMETANIDE 0.25 MG/ML
0.5 INJECTION, SOLUTION INTRAMUSCULAR; INTRAVENOUS EVERY 12 HOURS
Status: DISCONTINUED | OUTPATIENT
Start: 2025-03-30 | End: 2025-03-31 | Stop reason: HOSPADM

## 2025-03-30 RX ADMIN — PIPERACILLIN AND TAZOBACTAM 3.38 G: 3; .375 INJECTION, POWDER, FOR SOLUTION INTRAVENOUS at 20:25

## 2025-03-30 RX ADMIN — PIPERACILLIN AND TAZOBACTAM 3.38 G: 3; .375 INJECTION, POWDER, FOR SOLUTION INTRAVENOUS at 02:38

## 2025-03-30 RX ADMIN — HEPARIN SODIUM 1150 UNITS/HR: 10000 INJECTION, SOLUTION INTRAVENOUS at 16:50

## 2025-03-30 RX ADMIN — BUMETANIDE 0.5 MG: 0.25 INJECTION INTRAMUSCULAR; INTRAVENOUS at 18:57

## 2025-03-30 RX ADMIN — PIPERACILLIN AND TAZOBACTAM 3.38 G: 3; .375 INJECTION, POWDER, FOR SOLUTION INTRAVENOUS at 08:08

## 2025-03-30 RX ADMIN — CHLORHEXIDINE GLUCONATE ORAL RINSE 15 ML: 1.2 SOLUTION DENTAL at 20:22

## 2025-03-30 RX ADMIN — PRIMIDONE 50 MG: 50 TABLET ORAL at 22:37

## 2025-03-30 RX ADMIN — PROPOFOL 15 MCG/KG/MIN: 10 INJECTION, EMULSION INTRAVENOUS at 02:12

## 2025-03-30 RX ADMIN — IPRATROPIUM BROMIDE AND ALBUTEROL SULFATE 3 ML: .5; 3 SOLUTION RESPIRATORY (INHALATION) at 16:52

## 2025-03-30 RX ADMIN — IPRATROPIUM BROMIDE AND ALBUTEROL SULFATE 3 ML: .5; 3 SOLUTION RESPIRATORY (INHALATION) at 11:33

## 2025-03-30 RX ADMIN — PROPOFOL 25 MCG/KG/MIN: 10 INJECTION, EMULSION INTRAVENOUS at 17:20

## 2025-03-30 RX ADMIN — IPRATROPIUM BROMIDE AND ALBUTEROL SULFATE 3 ML: .5; 3 SOLUTION RESPIRATORY (INHALATION) at 07:46

## 2025-03-30 RX ADMIN — METOLAZONE 5 MG: 2.5 TABLET ORAL at 08:13

## 2025-03-30 RX ADMIN — Medication 25 MCG: at 08:13

## 2025-03-30 RX ADMIN — PROPOFOL 25 MCG/KG/MIN: 10 INJECTION, EMULSION INTRAVENOUS at 22:35

## 2025-03-30 RX ADMIN — PANTOPRAZOLE SODIUM 40 MG: 40 INJECTION, POWDER, FOR SOLUTION INTRAVENOUS at 08:04

## 2025-03-30 RX ADMIN — FLUCONAZOLE 200 MG: 2 INJECTION, SOLUTION INTRAVENOUS at 18:57

## 2025-03-30 RX ADMIN — ACETAMINOPHEN 325 MG: 325 TABLET, FILM COATED ORAL at 08:13

## 2025-03-30 RX ADMIN — CHLORHEXIDINE GLUCONATE ORAL RINSE 15 ML: 1.2 SOLUTION DENTAL at 08:14

## 2025-03-30 RX ADMIN — PROPOFOL 15 MCG/KG/MIN: 10 INJECTION, EMULSION INTRAVENOUS at 08:47

## 2025-03-30 RX ADMIN — PIPERACILLIN AND TAZOBACTAM 3.38 G: 3; .375 INJECTION, POWDER, FOR SOLUTION INTRAVENOUS at 14:36

## 2025-03-30 RX ADMIN — IPRATROPIUM BROMIDE AND ALBUTEROL SULFATE 3 ML: .5; 3 SOLUTION RESPIRATORY (INHALATION) at 19:58

## 2025-03-30 RX ADMIN — BUMETANIDE 0.5 MG: 0.25 INJECTION INTRAMUSCULAR; INTRAVENOUS at 08:04

## 2025-03-30 ASSESSMENT — ACTIVITIES OF DAILY LIVING (ADL)
ADLS_ACUITY_SCORE: 74
ADLS_ACUITY_SCORE: 70
ADLS_ACUITY_SCORE: 74
ADLS_ACUITY_SCORE: 70
ADLS_ACUITY_SCORE: 74
ADLS_ACUITY_SCORE: 70
ADLS_ACUITY_SCORE: 70
ADLS_ACUITY_SCORE: 74
ADLS_ACUITY_SCORE: 70
ADLS_ACUITY_SCORE: 70
ADLS_ACUITY_SCORE: 74
ADLS_ACUITY_SCORE: 74
ADLS_ACUITY_SCORE: 70
ADLS_ACUITY_SCORE: 70
ADLS_ACUITY_SCORE: 74
ADLS_ACUITY_SCORE: 74
ADLS_ACUITY_SCORE: 70

## 2025-03-30 NOTE — PROGRESS NOTES
[facility]    Hospitalist Progress Note      59 years old female with a past medical history of hypertension, diabetes mellitus type 2, pulmonary emphysema, intellectual disability with memory issues, hyperparathyroidism and multiple other medical issues was brought to the emergency room for high blood sugars with shortness of breath. Complains of pain generalized symptoms in the chest but difficult to clarify. Not a good historian. Most of the history I also has been obtained from chart/caregivers. In the ED was noted to be tachycardic in the 130s and a subsequent EKG was concerning for SVT. Repeat EKG had shown A-fib with rapid ventricular response. COVID influenza and RSV is negative. CBC showed a white count of 16.4 with a hemoglobin of 16.8 and a platelet count of 260. CMP showed sodium 134 potassium 4.5 BUN of 48.1 with a creatinine of 1.32. AST/ALT was 21/10. VBG showed a pH of 1.39 with a pCO2 of 38 and a bicarb of 23. Lactic acid is 1.8. Ketones was 1.89. Follow-up CT chest shows a small pulmonary artery embolism to the subsegmental branches of the left lower lobe with no right heart strain. Patient was initiated on therapeutic Lovenox in addition to insulin drip for possible diabetic ketoacidosis. Cardizem infusion was initiated for SVT/A-fib. She was admitted for further evaluation.         Assessment & Plan  Marly Cannon is a 59 year old female who was admitted on 3/22/2025.      Acute hypoxic/hypercapnic respiratory failure: Patient intubated afternoon of 3/23.  Etiology uncertain, however possibility includes PE, aspiration, pneumonia, versus bad COPD.  CTA chest on admission showed clear lungs, small subsegmental PE.  Placed on therapeutic Lovenox at that time.  Patient is subsequently required BiPAP, then intubation over the course of 8 to 10 hours.  Serial chest x-rays have been stable/clear.  Worst ABG showed pH of 7.05 with pCO2 103, suggesting retention.COPD exacerbation?  -3/24:  Intubated, sedated on 3/23.  Ventilator settings with FiO2 of 70%, PEEP of 5.  Latest ABG pH 7.34, pCO2 41, pO2 of 85..  Repeat CTA chest shows progression of right lower lobe pulmonary embolism, now involving the segmental as well as a subsegmental arteries.  There is collapse of the entire right lower lobe with opacification of bronchi within the right lower lobe, possible mucous plugging from aspiration of contents?  Continuing Zosyn  -3/25: Remains intubated, sedated.  Ventilator settings with FiO2 at 60%, PEEP of 5.  ABG with pH of 7.33.  Chest x-ray stable, persistent collapsed middle lobe.  On Zosyn, possible aspiration versus mucous plug.  Chest physiotherapy ordered by telemetry ICU.  -3/26: Patient remains on the ventilator.  ABG with pH 7.37 pCO2 34 pO2 79 bicarb 20.  Sats been running 96+ percent.  She is on 80% FiO2.  Repeat chest x-ray no significant changes.  Still has what appears to be right lower lobe collapse.  Clear lungs on exam.  He is getting significant amount of IV fluid over the last 24 hours of most 5 L.  No significant wheezing.  Will try and titrate down on the FiO2.  Somewhat surprising requires so much oxygen at this point.  His lung fields specially left lung is relatively clear.  No significant secretions.  Sedated with the Versed and fentanyl.  Continue with aggressive pulmonary toilet.  Wean FiO2.  Is on antibiotics for potential aspiration.  Getting IV steroids every 6 hours will cut back on the dosing of this also.  -3/27: Patient remains intubated.  Her oxygen requirements have been decreased down to 55% FiO2.  Arterial blood gas has a pH of 7.39 pCO2 34 pO2 56.  Chest x-ray still shows to the right lower lobe probable collapse.  Some mild pleural effusions bilaterally.  He is only on fentanyl currently for sedation.  Treating for pneumonia and her pulmonary embolism.  Chronically is on 2 L of O2.  -3/28: Reviewed patient's chart.  As an outpatient she does have a significant  history of fairly severe COPD.  She has had variable PFTs performed which all have showed obstructive lung disease but variable DLCO was due to poor patient cooperation with the studies.  There was also concern of a fixed obstruction however evaluation by ENT and CT scans were not able to document any of this finding.  She also does have severe obstructive sleep apnea with severe sleep associated hypoxemia they recommended BiPAP however patient was unable to tolerate this and refused any further intervention or trials.  Currently patient remains intubated.  ABG still looks good pH 7.37 pCO2 35 pO2 65.  Currently is on assist-control rate of 20 tidal volume 400 PEEP of 8 she is still on 55% FiO2.  Sats been fine greater than 90%.  She was on CPAP for well over 3 to 4 hours yesterday and tolerated it without issue.  Chest x-ray shows probably pleural effusions.  Minimal secretions.  Still have her on bronchodilators some steroids.  Minimal sedation.  Have some concern regarding this.  Will evaluate neurostatus further here today.  Pulmonary embolism be treated with heparin.  Does have fairly severe underlying COPD.  Also fairly severe sleep apnea.  She is very alert and appropriate this morning.  Down to 50% FiO2.  Still on PEEP of 8.  Will try to put her on CPAP again here this morning for a while and see how she does.  Not quite sure she is ready for attempted extubation.  Will see how she does and discuss further with tele-ICU.  -3/29: Remains intubated ABG looks good is able to tolerate CPAP without difficulty.  Still is on 50% FiO2.  Will contact telemetry ICU regarding their thoughts regarding an attempt at extubation.  She was very alert actually breathing over her current vent settings she was placed on CPAP by respiratory therapy.  At this point considering extubation at some point this morning will just run up by tele ICU.  -3/30: This is day 8 of intubation.  Yesterday had an increase in her FiO2  requirements without a whole lot of change in her other vital signs.  She is on SIMV rate of 20 with FiO2 now down to 65%.  5 PEEP pressures remain good.  Did put her on some propofol just for comfort.  Minimal amounts of secretions.  Is on Zosyn.  Does have known pulmonary emboli.  Bilateral pleural effusions.  Has had collapse of that right middle lower lobe noted on CT scan previously also.  She really is in no distress at all.  Why she had the increase in her FiO2 yesterday is a little unclear.  We are in the process of diuresing her.  Certainly would like to strongly consider extubation soon.  She tolerates CPAP at least yesterday without a great deal of issues.  Sputum sent is growing out just Candida is on some fluconazole.  Will continue with current support with hopefully extubation soon.  We are able to get her oxygen requirements down and she is down to 55% now.  Titrate down as feasible.  In no real distress at all at this time.      Suspected pneumonia: initial CTA chest shows clear lungs, however patient tachypneic, increased work of breathing.  Oxygen requirement likely due to COPD.  Repeat CTA findings as above.  -Continuing Zosyn  -Continue to monitor respiratory status on ventilator  -3/26: Currently is on Zosyn/vancomycin.  MRSA/SA swab negative.  White count is 22,000.  No major fevers.  Blood cultures remain negative.  Minimal secretions.  -3/28: Have discontinued vancomycin.  Patient remains on Zosyn.  Blood cultures negative.  A sputum sample was obtained couple days ago does show 3+ yeast and 2+ gram-positive cocci in pairs culture is in process.  Remains afebrile.  White count still elevated 19,000.  -3/29: Patient patric on Zosyn.  Yeast growing out of her sputum she is on fluconazole.  X-ray shows what appears to be increasing pleural effusions in all likelihood.  She did have the right middle/lower lobe collapse.  -3/30: Patient's been afebrile.  Remains on IV Zosyn.  Only growing out some  Candida from her sputum.  White count mild elevation 16,000.     Shock: Uncertain etiology.  Will need to rule out obstructive shock with CTA.  Patient is now on moderate amount of Levophed.  She has been adequately volume resuscitated per sepsis protocol.  -3/24: Maintenance fluids.  Art line inserted, will titrate pressors to MAP of 60.  Echocardiogram ordered.  WBC 15.5, Tmax 99.7 in last 24 hours.  Urinalysis negative for significant pyuria.  Initial CTA on admission showed clear lungs.  CTA without massive saddle embolus, CT abdomen pelvis with dilated gallbladder, but no biliary dilatation, no obvious obstruction.  No thickened gallbladder wall, no fluid collection.  CT also shows subcutaneous edema in the lower anterior abdominal wall and into the right labia.  Possible cellulitic infection?  Will continue IV Zosyn.  Adding vancomycin  -3/25: Patient with persistent vasopressor requirement, 0.08 Levophed.  Turned up periodically to 0.11.  Possible source of infections include aspiration pneumonia, cellulitis of lower abdominal wall/labia, gallbladder.  LFTs have remained unremarkable.  Continuing Zosyn/vancomycin  -3/26: Blood pressures have been greater than 100 systolic.  He is on 0.05 of Levophed.  Will continue to titrate this down his tolerated.  Has received significant IV fluids at this point.  Urine output has been on the somewhat oliguric side.  Echocardiogram was a very technically difficult study the LV function was normal however.  Right ventricle did not appear to be grossly dilated.  -3/27: Patient had what appeared to be started a supraventricular rhythm throughout yesterday.  She reverted back to sinus rhythm around 8 PM at that time her pressures did come up significantly.  She is no longer requiring any pressors.  -3/28: Patient patric off pressors.  Blood pressures been greater than 100 systolic.  Heart rates been in the 70s in sinus rhythm.  She is very sensitive to her atrial dysrhythmias.   Once she converted back to sinus rhythm she immediately had significant improvement in her blood pressures.  Echocardiogram has shown normal systolic function.  She is off the amiodarone.  Volume wise she probably is volume up in terms of the amount of IV fluid she is received.  Weights have shown significant weight gain over her stay she was 90.4 kg 107.8 yesterday.  -3/29 resolved     Encephalopathy: Toxic versus metabolic.  Patient is intubated and sedated.  -3/24: CT of the head negative for acute pathology.  -3/26: Patient sedated at this point difficult to really assess her mental status.  -3/28: Patient's morning does open her eyes to voice.  Follows commands appropriately.  Nods yes and no.  Squeezes hands moves toes.  She is on no sedation at this time.  -3/29: Alert following commands.  -3/30: Yesterday she continued to be very alert appropriate following commands without trouble.  After we had to increase her FiO2 I did have them put her back on some propofol for comfort.  Now this morning she is awake alert following commands appropriate.     Pulmonary embolism: Subsegmental with no right heart strain at least on CT.  Patient given therapeutic Lovenox in the emergency room.  -Echocardiogram ordered  -3/24: Currently on Lovenox twice daily.  Given worsening renal function, will switch her to heparin  -3/25: Continuing on heparin gtt  -3/26: PE on heparin drip.  -3/27: Continues on the heparin infusion.  -3/28: At some point we will switch over to oral anticoagulant  -3/29: IV heparin  -3/30: Is on heparin.     CVS:  Patient is initial EKG with A-fib RVR with heart rate in the 150s.  Patient received diltiazem, now she is converted.  She was on p.o. diltiazem 60 mg every 8 hours scheduled, as well as a diltiazem drip.  This was stopped due to hypotension.  Patient has also had self-limited runs of SVT.  -3/24: Shock requiring pressors.  Patient currently normal sinus rhythm but rate is tachy at 120.   Echocardiogram is ordered.  Given hypotension, cannot use calcium channel blocker or beta-blocker.  EKG shows SVT versus aflutter.  Will start her on amiodarone drip.  Continuing anticoagulation with heparin gtt  -3/25: Patient remains on Levophed, amiodarone.  Heart rate better controlled in the 1 teens to 120s, but still appears to be SVT/aflutter.  Continues on heparin drip as well.  Source of shock uncertain.  Echocardiogram official read with ejection fraction of 65 to 70%, hyperdynamic LV, no significant valvular or structural abnormalities.  -3/26: Heart rate rate around 115.  Is regular.  Cannot really tell on telemetry if this is not a flutter or sinus tachycardia will obtain twelve-lead EKG.  He is on amiodarone as apparently did have SVT and A-fib earlier.  -3/27: Patient did revert to sinus rhythm last night heart rates have been now in the 60-70 range.  Amiodarone was discontinued.  -3/28: As above she does not seem to tolerate any type of atrial dysrhythmia.  Does get rather hypotensive.  She is been in sinus rhythm with blood pressures doing very well anywhere from 105-100 30s systolic range.  Art line is in place.  Likely will be able to discontinue this in the next 24 hours.  Site still looks okay.  -3/29: Patient been off pressors for couple days.  Blood pressure has been right around 100-105 systolic range heart rate this morning is gone up a little bit to 100.  Remains sinus rhythm.  -3/30: Remains hemodynamically stable.  Sinus rhythm.  Did have a brief episode yesterday of perhaps supraventricular rhythm once again however was short-lived.  Definitely volume up.  Did get a dose of Bumex yesterday 34 cc out.  Will continue with this today     Uncontrolled diabetes mellitus type 2, insulin-dependent: This is despite insulin pump.  Patient had functioning insulin pump on 3/20/2025 when she was discharged from this facility.  Anion gap is 17, but blood glucoses have been in the 200s.  Serum ketones  resolved for a time, but now have returned.  Ketones uptrending  -3/23: Insulin pump removed.  Was given long-acting insulin 20 units of Toujeo this a.m., ketones continuing to uptrend.  Will place her back on insulin drip at this time.  -3/24: Patient on insulin drip.  Shutting off periodically for euglycemia.  -3/25: Patient intermittently on insulin drip.  Bicarb 17, anion gap closed at 15.  Holding subcu insulin in favor of insulin drip at this time  -3/26: Is on the insulin infusion at this point.  Sugars are appropriate.  He is getting tube feedings at goal rate.  -3/27: Remains on insulin infusion sugars have been fine 120 range.  -3/28: May consider subcu transition in the next day or so also.  She remains on tube feedings and tolerating them well.  -3/29: Patient is on insulin infusion.  Blood sugars have been 1 30-1 50 range insulin titrated.  He is getting the tube feedings.  -3/30: Remains on insulin drip.     YADIRA: Creatinine rising since admission, now 1.64.  Previously slightly above 1, on presentation was 1.3.  Patient did receive contrast.  -3/24: Creatinine 1.91.  Maintaining IV fluids.  Monitoring renal function and urine output  -3/25: Creatinine climbing to 2.06 despite IV fluids.  Patient did receive IV contrast, possible contrast nephropathy.  Urine output has been adequate.  -3/26: Somewhat oliguric.  Urine output only 1 L over the last 24 hours.  A total of 5163 cc in.  BUN/creatinine are slightly improved to 55/1.91.  Potassium 3.8 sodium is 144.  Minimal lower extremity edema.  At some point may require at least some Lasix.  -3/27: Still somewhat oliguric.  Creatinine has jumped up somewhat today to 2.09 BUN 60.5 sodium 148.  Does need increasing free water.  Will administer this via her gastric tube.  Also change to half-normal saline.  Landrum catheter is in place.  Repeat UA urine sodium and creatinine.  -3/28: Urine output is picking up a bit.  Was started on metolazone recommended by  tele ICU her creatinine is no longer rising.  Is down to 1.95.  Potassium is 3.5.  Magnesium was 2.0.  Serum sodium is also improved.  Have been giving increased free water via her oral gastric tube.  -3/29: Urine output has picked up.  With 800 cc out yesterday Landrum catheter is in place.  Total intake was 1700.  Creatinine still remains right around 2.  BUN in the 60 range.  The potassium is 3.9 sodium is 139.  Is not getting any current IV fluids.  -3/30: Urine output has picked up significantly with diuresis.  I did increase her dosing to twice daily.  She is getting free water via her G-tube will cut back on the amount.  Her serum sodium has been good.  Creatinine still is in the 2 range.  Potassium 4.2.  Magnesium was 1.9    COPD: Oxygen dependence established last admission.  Patient was discharged on 2 L nasal cannula after last admission..  Also has a history of severe obstructive sleep apnea with severe nocturnal hypoxia.  Declined BiPAP which is recommended by the sleep study group.     FEN: Patient is on tube feedings rate of 60 cc an hour tolerating them well..      Diet: NPO for Medical/Clinical Reasons Except for: No Exceptions  Adult Formula Drip Feeding: Specified Time Osmolite 1.5; Orogastric tube; Goal Rate: 60; mL/hr; Keep at 10ml/hr for 24 hours. After 24 hours advance by 5ml/hr Q8H.  Fluids: None    DVT Prophylaxis: IV heparin  Code Status: Full Code    Disposition: Expected discharge no later than 3 to 5 days  Prosper Schulz MD    Interval History   This morning patient is once again very alert she is doing better on the ventilator she is down to 55% will try and titrate this down further.  Vent pressures are good afebrile hemodynamically stable.  Good diuresis yesterday will further diurese her today x-ray does show bilateral pleural effusions.  Would certainly like to do an attempted extubation within the next 1 to 2 days.  Does have the art line in place I think were going to need to get  rid of that pretty soon it has been in for a week.    -Data reviewed today: I reviewed all new labs and imaging results over the last 24 hours. I personally reviewed the chest x-ray image(s) showing all tubes in correct position.  Some bilateral pleural effusions .    Physical Exam   Temp: 98  F (36.7  C) Temp src: Tympanic BP: 108/67 Pulse: 75   Resp: 24 SpO2: (!) 91 % O2 Device: Mechanical Ventilator    Vitals:    03/26/25 0622 03/27/25 0441 03/29/25 0553   Weight: 102.1 kg (225 lb 1.4 oz) 107.8 kg (237 lb 10.5 oz) 112.3 kg (247 lb 9.2 oz)     Vital Signs with Ranges  Temp:  [96.9  F (36.1  C)-98  F (36.7  C)] 98  F (36.7  C)  Pulse:  [] 75  Resp:  [14-31] 24  BP: ()/(53-78) 108/67  MAP:  [62 mmHg-86 mmHg] 69 mmHg  Arterial Line BP: ()/(37-70) 109/52  FiO2 (%):  [45 %-85 %] 65 %  SpO2:  [89 %-97 %] 91 %  I/O last 3 completed shifts:  In: 3707 [I.V.:1429; NG/GT:1530]  Out: 2895 [Urine:2895]    Peripheral IV 03/25/25 Anterior;Left Lower forearm (Active)   Site Assessment WDL 03/30/25 0200   Line Status Saline locked 03/30/25 0200   Dressing Transparent 03/30/25 0200   Dressing Status clean;dry;intact 03/28/25 2000   Line Intervention Flushed 03/29/25 2000   Line Necessity Yes, meets criteria 03/30/25 0200   Phlebitis Scale 0-->no symptoms 03/29/25 1630   Infiltration? no 03/29/25 0624   Number of days: 5       Arterial Line 03/23/25 Radial (Active)   Site Assessment WDL 03/30/25 0200   Line Status Pulsatile blood flow 03/30/25 0200   Art Line Waveform Appropriate 03/30/25 0200   Art Line Interventions Connections checked and tightened;Flushed per protocol;Line pulled back 03/30/25 0200   Color/Movement/Sensation Capillary refill less than 3 sec 03/30/25 0200   Line Necessity Yes, meets criteria 03/30/25 0200   Dressing Type Transparent 03/30/25 0200   Dressing Status Clean, dry, intact 03/30/25 0200   Dressing Intervention Dressing changed/new dressing 03/27/25 2200   Dressing Change Due 03/31/25  03/29/25 1630   Number of days: 7       CVC Triple Lumen Right Internal jugular (Active)   Site Assessment WDL 03/30/25 0200   Dressing Chlorhexidine disk;Transparent 03/30/25 0200   Dressing Status clean;dry;intact 03/29/25 0800   Dressing Intervention dressing changed 03/29/25 0624   Line Necessity Yes, meets criteria 03/30/25 0200   Blue - Status infusing;blood return noted 03/30/25 0200   Blue - Intervention Flushed 03/29/25 2000   Brown - Status blood return noted;saline locked 03/30/25 0200   Brown - Intervention Flushed 03/29/25 2000   White - Status infusing;blood return noted 03/30/25 0200   White - Intervention Flushed 03/29/25 2000   Phlebitis Scale 0-->no symptoms 03/30/25 0200   Infiltration? no 03/30/25 0200   Number of days: 7       ETT Cuffed 7.5 mm (Active)   Secured at (cm) 21 cm 03/30/25 0400   Measured from Teeth/Gums 03/30/25 0400   Position Right 03/30/25 0313   Secured by Commercial tube haque 03/30/25 0313   Bite Block Included with Commercial tube haque 03/30/25 0313   Site Appearance Clean;Dry 03/30/25 0313   Tube Care Site care done 03/29/25 1646   Cuff Assessment Cuff Pressure 03/30/25 0000   Cuff Pressure - cm H2O 24 cmH20 03/30/25 0000   Safety Measures Manual resuscitator at bedside 03/30/25 0313   Number of days: 7       GI Enteral Access Device Mouth, center Gastric 18 fr (Active)   Status Feeding 03/30/25 0400   Placement Confirmation Ely unchanged 03/30/25 0400   Surrounding Skin Assessment WDL 03/30/25 0400   Insertion Site Assessment WDL 03/30/25 0400   Ely (cm marking) at nare/mouth 57 cm 03/30/25 0400   Ely (visual) Ely at entry site (nare, mouth, etc.) 03/30/25 0400   Securement Device Tape 03/30/25 0400   Drainage Tan 03/30/25 0400   Intake (mL) 30 ml 03/29/25 0800   Flush/Free Water (mL) 250 mL 03/30/25 0400   Residual (mL) 150 mL 03/30/25 0400   Output (mL) 500 ml 03/25/25 0600   Number of days: 7       Urinary Drain 03/23/25 Urethral Catheter (Active)    Tube Description Positional 03/30/25 0042   Catheter Care Catheter wipes 03/30/25 0400   Perineal Skin Assessment Edema;Erythema 03/30/25 0400   Collection Container Standard 03/30/25 0400   Securement Method Commercial securement device 03/30/25 0400   Rationale for Continued Use ICU only: hourly urine output needed for patient care 03/30/25 0400   Urine Output 200 mL 03/30/25 0600   Number of days: 7     Right IJ central line site secured left radial art line and site secured     Constitutional: Alert answering appropriately orally intubated no distress  HEENT: Normocephalic/atraumatic, PERRL, EOMI, mouth oral E T-tube and gastric tube, neck supple, no LN.     Cardiovascular: RRR. no Murmur, no  rubs, or gallops.  JVD unable. .  Pulses 2+    Respiratory: Occasional scattered upper airway rhonchi decreased breath sounds bases bilaterally    Abdomen: Soft, nontender, nondistended, no organomegaly. Bowel sounds present    Extremities: Warm/dry.  Plus edema    Neuro:   Non focal, cranial nerves intact, Moves all extremities.  Medications   Current Facility-Administered Medications   Medication Dose Route Frequency Provider Last Rate Last Admin    dextrose 10% infusion   Intravenous Continuous PRN Bibi Cancino MD        heparin 25,000 units in 0.45% NaCl 250 mL ANTICOAGULANT infusion  0-5,000 Units/hr Intravenous Continuous Bibi Cancino MD 12.5 mL/hr at 03/30/25 0544 1,250 Units/hr at 03/30/25 0544    insulin regular (MYXREDLIN) 1 unit/mL infusion  0-24 Units/hr Intravenous Continuous Bibi Cancino MD 4 mL/hr at 03/30/25 0439 4 Units/hr at 03/30/25 0439    propofol (DIPRIVAN) infusion  5-75 mcg/kg/min Intravenous Continuous Prosper Schulz MD 9.7 mL/hr at 03/30/25 0212 15 mcg/kg/min at 03/30/25 0212    And    Medication Instruction   Does not apply Continuous PRN Prosper Schulz MD        Patient is already receiving anticoagulation with heparin, enoxaparin (LOVENOX), warfarin (COUMADIN)  or other  anticoagulant medication   Does not apply Continuous PRN Te Parish MD        [Held by provider] sodium chloride 0.45% infusion   Intravenous Continuous Prosper Schulz MD   Stopped at 03/27/25 1751     Current Facility-Administered Medications   Medication Dose Route Frequency Provider Last Rate Last Admin    [Held by provider] aspirin (ASA) chewable tablet 81 mg  81 mg Oral Daily Te Parish MD   81 mg at 03/24/25 0948    [Held by provider] atorvastatin (LIPITOR) tablet 40 mg  40 mg Oral At Bedtime Te Parish MD   40 mg at 03/23/25 2301    bumetanide (BUMEX) injection 0.5 mg  0.5 mg Intravenous Q24H Kin Andrade MD   0.5 mg at 03/29/25 1214    chlorhexidine (PERIDEX) 0.12 % solution 15 mL  15 mL Mouth/Throat Q12H Gentry Saez DO   15 mL at 03/29/25 2051    [Held by provider] empagliflozin (JARDIANCE) tablet 25 mg  25 mg Oral Daily Te Parish MD   25 mg at 03/24/25 0948    fluconazole (DIFLUCAN) intermittent infusion 200 mg  200 mg Intravenous Q24H Bibi Cancino  mL/hr at 03/29/25 1828 200 mg at 03/29/25 1828    [Held by provider] insulin glargine U-300 (TOUJEO) injection 20 Units  20 Units Subcutaneous QAM Te Parish MD   20 Units at 03/23/25 0911    ipratropium - albuterol 0.5 mg/2.5 mg/3 mL (DUONEB) neb solution 3 mL  3 mL Nebulization 4x daily Delmy Harris MD   3 mL at 03/29/25 1959    metolazone (ZAROXOLYN) tablet 5 mg  5 mg Per OG Tube Daily Prosper Schulz MD   5 mg at 03/29/25 0848    pantoprazole (PROTONIX) 2 mg/mL suspension 40 mg  40 mg Per Feeding Tube QAM Gentry Peng DO        Or    pantoprazole (PROTONIX) IV push injection 40 mg  40 mg Intravenous QAM Gentry Peng DO   40 mg at 03/29/25 0827    piperacillin-tazobactam (ZOSYN) 3.375 g vial to attach to  mL bag  3.375 g Intravenous Q6H Bibi Cancino MD   3.375 g at 03/30/25 0238    primidone (MYSOLINE) tablet 50 mg  50  mg Oral or Feeding Tube At Bedtime Bibi Cancino MD   50 mg at 03/29/25 2216    Vitamin D3 (CHOLECALCIFEROL) tablet 25 mcg  25 mcg Oral or Feeding Tube Daily Bibi Cancino MD   25 mcg at 03/29/25 0826       Data   Recent Labs   Lab 03/30/25  0445 03/30/25  0236 03/30/25  0032 03/29/25  0547 03/29/25  0509 03/28/25  0401 03/28/25  0236 03/28/25  0235 03/23/25  1856 03/23/25  1826   WBC 16.3*  --   --   --  20.5*  --  19.2*  --    < >  --    HGB 12.8  --   --   --  14.0  --  13.9  --    < >  --    MCV 93  --   --   --  92  --  94  --    < >  --      --   --   --  217  --  190  --    < >  --    INR  --   --   --   --   --   --   --   --   --  1.29*     --   --   --  139  --   --  141   < > 143   POTASSIUM 4.2  --   --   --  3.9  --   --  3.5   < > 4.0   CHLORIDE 109*  --   --   --  110*  --   --  112*   < > 107   CO2 21*  --   --   --  18*  --   --  20*   < > 24   BUN 61.6*  --   --   --  60.8*  --   --  59.1*   < > 56.6*   CR 2.09*  --   --   --  2.01*  --   --  1.95*   < > 1.91*   ANIONGAP 12  --   --   --  11  --   --  9   < > 12   NORM 8.0*  --   --   --  8.2*  --   --  8.1*   < > 7.9*   * 119* 109*   < > 155*   < >  --  136*   < > 157*   ALBUMIN 1.7*  --   --   --  1.8*  --   --  1.7*   < >  --    PROTTOTAL 4.7*  --   --   --  5.0*  --   --  5.0*   < >  --    BILITOTAL 0.4  --   --   --  0.3  --   --  0.3   < >  --    ALKPHOS 74  --   --   --  76  --   --  79   < >  --    ALT 17  --   --   --  18  --   --  19   < >  --    AST 24  --   --   --  26  --   --  29   < >  --     < > = values in this interval not displayed.       Recent Results (from the past 24 hours)   XR Chest Port 1 View    Narrative    EXAM: XR CHEST PORT 1 VIEW  LOCATION: [facility]  DATE: 3/29/2025    INDICATION: increasing oxygen requirements  COMPARISON: Earlier today at 6:00 AM      Impression    IMPRESSION: Endotracheal tube tip remains 5.5 cm above the carlos. Right IJ central venous catheter tip in the mid  SVC. Enteric tube coursing into the stomach, tip outside the field-of-view. Stable small to moderate bilateral pleural effusions. Slight   increase in atelectasis in the perihilar right lung and otherwise stable bibasilar atelectasis. No pneumothorax. Normal heart size.

## 2025-03-30 NOTE — PLAN OF CARE
Plan of Care Reviewed With: Patient and S/O    Overall Patient Progress: Improving    Pt continues to be intubated. Vent settings are VC- SIMV, FiO2 of 55%, RR 20, , pressure support of 8, and PEEP of 5. ET remains 21 at the gums. CPT done x2 this shift. Pt tolerated it well and actually enjoyed it. OG is 57 at the lips. Osmolite 1.2 is infusing due to hospital not having osmolite 1.5. Provider is aware. Infusing at 60 mL/hr. OG clamped for one hour before CPT. CVC has good blood return on all 3 lumens. Blue and White lumen are infusing. Brown lumen is SL. Landrum catheter remains in place and pt had large urine output this shift. One smear this shift. Repositioned frequently as documented. Oral cares also done as documented. Pt BP is tolerating propofol better this shift. New restraints placed this shift. Heparin, insulin, and propofol infusing as documented in MAR. New mepilex dressings placed on buttocks/sacrum. S/O Tavo given a update.      Face to face report given with opportunity to observe patient.    Report given to Tamara Jones RN   3/30/2025  7:15 PM

## 2025-03-30 NOTE — PROGRESS NOTES
Changed Tube haque with bite block, End tidal, HME, and Inline suction catheter. No breakdown of the skin noted where tube haque is placed.  Also completed postural drainage and chest physiotherapy. Patient was laying with left side down during percussion. Airway was secure and vitals remained stable throughout the treatment.  Patient tolerated well with no agitation, tube feeding was stopped hours prior to reduce risk of aspiration.          Vicky Blanchard, RT

## 2025-03-30 NOTE — PLAN OF CARE
Pt remains intubated, Vent settings. SIMV 70%  FiO2, RR 20, , PEEP 5. ETT 21 at the gums. CVC intact. Blood return noted in all lumens. Propofol and Heparin infusing per MAR. BG monitored and insulin gtt adjusted per algorithm. OG infusing tube feeding. Landrum catheter intact. Oral cares and repositioning q2hrs. Frequent rounding.     Face to face report given with opportunity to observe patient.    Report given to KHADIJAH Lock RN   3/30/2025  7:38 AM

## 2025-03-30 NOTE — PLAN OF CARE
Right wrist and Left wrist restraints continued 3/30/2025    Clinical Justification: Pulling lines, pulling tubes, and pulling equipment  Less Restrictive Alternative: Repositioning, Re-evaluate equipment, Disguise equipment, Pain management, Alarm  Attending Provider Notified: Yes, Attending Provider's Name: Dr. Schulz   New orders placed Yes  Length of Order: 1 Day      Hayde Jones RN

## 2025-03-30 NOTE — PROVIDER NOTIFICATION
03/30/25 0313   Ventilator Settings    Vent Mode SIMV   Resp Rate (Set) 20 breaths/min   Tidal Volume (Set, mL) 450 mL   FiO2 65 %   PEEP (cm H2O) 5 cmH2O     Sp02 92% on 70% Fio2. BS coarse to clear. 1cc air added to cuff due to leak. Sxn for small amount  thin pale yellow secretions. Pt rested comfortably t/o shift.

## 2025-03-30 NOTE — PHARMACY
"[facility]    Pharmacy      Antimicrobial Stewardship Note     Current antimicrobial therapy:  Anti-infectives (From now, onward)      Start     Dose/Rate Route Frequency Ordered Stop    03/25/25 1900  fluconazole (DIFLUCAN) intermittent infusion 200 mg         200 mg  100 mL/hr over 60 Minutes Intravenous EVERY 24 HOURS 03/25/25 1840      03/23/25 1000  piperacillin-tazobactam (ZOSYN) 3.375 g vial to attach to  mL bag        Note to Pharmacy: For [facility], [facility] and [facility]: For Zosyn-naive patients, use the \"Zosyn initial dose + extended infusion\" order panel.    3.375 g  over 30 Minutes Intravenous EVERY 6 HOURS 03/23/25 0907              Indication: Asp PNA, candidiasis    Days of Therapy: 9 Zosyn, 6 fluconazole     Pertinent labs:  Creatinine   Creatinine   Date Value Ref Range Status   03/30/2025 2.09 (H) 0.51 - 0.95 mg/dL Final   03/29/2025 2.01 (H) 0.51 - 0.95 mg/dL Final   03/28/2025 1.95 (H) 0.51 - 0.95 mg/dL Final   06/23/2021 1.07 (H) 0.52 - 1.04 mg/dL Final   05/10/2021 0.96 0.52 - 1.04 mg/dL Final   02/10/2021 1.07 (H) 0.52 - 1.04 mg/dL Final     WBC   WBC   Date Value Ref Range Status   11/09/2020 7.5 4.0 - 11.0 10e9/L Final   05/21/2020 9.7 4.0 - 11.0 10e9/L Final   07/01/2019 9.0 4.0 - 11.0 10e9/L Final     WBC Count   Date Value Ref Range Status   03/30/2025 16.3 (H) 4.0 - 11.0 10e3/uL Final   03/29/2025 20.5 (H) 4.0 - 11.0 10e3/uL Final   03/28/2025 19.2 (H) 4.0 - 11.0 10e3/uL Final     Procalcitonin   Procalcitonin   Date Value Ref Range Status   03/29/2025 0.66 (H) <0.50 ng/mL Final     Comment:     Interpretation and Recommendations     <0.5 ng/mL:   Systemic bacterial infection unlikely. Local bacterial infection is possible.     0.5-1.99 ng/mL:   Systemic bacterial infection possible, but various other conditions are known to induce PCT as well.     >=2.00 ng/mL:   Systemic bacterial infection likely, unless other causes are known.     Decision to start antibiotics should not be based " on procalcitonin level alone. See Procalcitonin Guidance document for more details. https://[employer]/files/[facility]/documents/adult-procalcitonin-guidance-on-tuqnhyofuye95184.pdf    Factors that may affect PCT levels (not all-inclusive):     - Increased PCT level           Severe trauma/burns           Invasive surgery           Cooling therapy after cardiac arrest/surgery           Treatment with agents which stimulate cytokines           Acute kidney injury           Chronic kidney disease and end stage renal disease           Acute graft vs host disease           Non-specific shock causing decreased organ perfusion and/or infarction       - Normal or unchanged PCT level           Early in infections (if low and infection is suspected, repeating in 6-12 hours is recommended)           Chronic infections (endocarditis, osteomyelitis, prosthetic device/graft infections)           Localized infections (cellulitis, wound infections, intra-abdominal abscess)     Note: PCT has not been extensively studied in pregnancy/breastfeeding, pediatrics, severe immunosuppression, and cystic fibrosis.   03/22/2025 3.23 (H) <0.50 ng/mL Final     Comment:     Interpretation and Recommendations     <0.5 ng/mL:   Systemic bacterial infection unlikely. Local bacterial infection is possible.     0.5-1.99 ng/mL:   Systemic bacterial infection possible, but various other conditions are known to induce PCT as well.     >=2.00 ng/mL:   Systemic bacterial infection likely, unless other causes are known.     Decision to start antibiotics should not be based on procalcitonin level alone. See Procalcitonin Guidance document for more details. https://Social [employer]/files/[facility]/documents/adult-procalcitonin-guidance-on-[email or web address].pdf    Factors that may affect PCT levels (not all-inclusive):     - Increased PCT level           Severe trauma/burns           Invasive surgery           Cooling therapy after cardiac arrest/surgery            Treatment with agents which stimulate cytokines           Acute kidney injury           Chronic kidney disease and end stage renal disease           Acute graft vs host disease           Non-specific shock causing decreased organ perfusion and/or infarction       - Normal or unchanged PCT level           Early in infections (if low and infection is suspected, repeating in 6-12 hours is recommended)           Chronic infections (endocarditis, osteomyelitis, prosthetic device/graft infections)           Localized infections (cellulitis, wound infections, intra-abdominal abscess)     Note: PCT has not been extensively studied in pregnancy/breastfeeding, pediatrics, severe immunosuppression, and cystic fibrosis.   03/17/2025 0.11 <0.50 ng/mL Final     Comment:     Interpretation and Recommendations     <0.5 ng/mL:   Systemic bacterial infection unlikely. Local bacterial infection is possible.     0.5-1.99 ng/mL:   Systemic bacterial infection possible, but various other conditions are known to induce PCT as well.     >=2.00 ng/mL:   Systemic bacterial infection likely, unless other causes are known.     Decision to start antibiotics should not be based on procalcitonin level alone. See Procalcitonin Guidance document for more details. https://[employer]ul/files/[facility]/documents/adult-procalcitonin-guidance-on-[email or web address].pdf    Factors that may affect PCT levels (not all-inclusive):     - Increased PCT level           Severe trauma/burns           Invasive surgery           Cooling therapy after cardiac arrest/surgery           Treatment with agents which stimulate cytokines           Acute kidney injury           Chronic kidney disease and end stage renal disease           Acute graft vs host disease           Non-specific shock causing decreased organ perfusion and/or infarction       - Normal or unchanged PCT level           Early in infections (if low and infection is suspected, repeating in 6-12 hours is  recommended)           Chronic infections (endocarditis, osteomyelitis, prosthetic device/graft infections)           Localized infections (cellulitis, wound infections, intra-abdominal abscess)     Note: PCT has not been extensively studied in pregnancy/breastfeeding, pediatrics, severe immunosuppression, and cystic fibrosis.     CRP   CRP Inflammation   Date Value Ref Range Status   07/01/2019 <2.9 0.0 - 8.0 mg/L Final       Culture Results:      Recommendations/Interventions:  1. Pt on day 9 of Zosyn, consider discontinuing, if appropriate.    Leonel Cao, [facility]  March 30, 2025

## 2025-03-30 NOTE — PROGRESS NOTES
03/30/25 1653   Ventilator Settings    Vent Mode SIMV   Resp Rate (Set) 20 breaths/min   Tidal Volume (Set, mL) 450 mL   FiO2 55 %   PEEP (cm H2O) 5 cmH2O   Pressure Support (cm H2O) 8 cmH2O     7.5 tube still 21 cm at the gums. Ambu bag connected at wall O2 source. Tube haque, end tidal, inline suction catheter, and HME all changed today. Also completed chest physiotherapy twice without incident. Patient lung sounds are clear with faint crackles in the bases. Suctioned out small amounts of thin clear secretions after CPT. Patient appears comfortable at this time.

## 2025-03-31 ENCOUNTER — APPOINTMENT (OUTPATIENT)
Dept: CT IMAGING | Facility: HOSPITAL | Age: 60
DRG: 981 | End: 2025-03-31
Attending: INTERNAL MEDICINE
Payer: COMMERCIAL

## 2025-03-31 ENCOUNTER — APPOINTMENT (OUTPATIENT)
Dept: RADIOLOGY | Facility: HOSPITAL | Age: 60
DRG: 981 | End: 2025-03-31
Attending: INTERNAL MEDICINE
Payer: COMMERCIAL

## 2025-03-31 ENCOUNTER — HOSPITAL ENCOUNTER (INPATIENT)
Facility: HOSPITAL | Age: 60
End: 2025-03-31
Attending: STUDENT IN AN ORGANIZED HEALTH CARE EDUCATION/TRAINING PROGRAM | Admitting: INTERNAL MEDICINE
Payer: COMMERCIAL

## 2025-03-31 ENCOUNTER — APPOINTMENT (OUTPATIENT)
Dept: GENERAL RADIOLOGY | Facility: HOSPITAL | Age: 60
DRG: 870 | End: 2025-03-31
Attending: INTERNAL MEDICINE
Payer: COMMERCIAL

## 2025-03-31 ENCOUNTER — APPOINTMENT (OUTPATIENT)
Dept: CT IMAGING | Facility: HOSPITAL | Age: 60
DRG: 870 | End: 2025-03-31
Attending: INTERNAL MEDICINE
Payer: COMMERCIAL

## 2025-03-31 VITALS
SYSTOLIC BLOOD PRESSURE: 116 MMHG | HEIGHT: 65 IN | HEART RATE: 75 BPM | WEIGHT: 241.18 LBS | BODY MASS INDEX: 40.18 KG/M2 | RESPIRATION RATE: 20 BRPM | TEMPERATURE: 97.8 F | OXYGEN SATURATION: 95 % | DIASTOLIC BLOOD PRESSURE: 78 MMHG

## 2025-03-31 DIAGNOSIS — J18.9 PNEUMONIA OF RIGHT LOWER LOBE DUE TO INFECTIOUS ORGANISM: ICD-10-CM

## 2025-03-31 DIAGNOSIS — J96.01 ACUTE RESPIRATORY FAILURE WITH HYPOXIA (H): ICD-10-CM

## 2025-03-31 DIAGNOSIS — L89.302 PRESSURE INJURY OF BUTTOCK, STAGE 2, UNSPECIFIED LATERALITY (H): Primary | ICD-10-CM

## 2025-03-31 DIAGNOSIS — T81.89XA NON-HEALING SURGICAL WOUND OF RIGHT GROIN, INITIAL ENCOUNTER: ICD-10-CM

## 2025-03-31 DIAGNOSIS — L02.215 PERINEAL ABSCESS: ICD-10-CM

## 2025-03-31 LAB
ALBUMIN SERPL BCG-MCNC: 1.7 G/DL (ref 3.5–5.2)
ALBUMIN SERPL BCG-MCNC: 1.8 G/DL (ref 3.5–5.2)
ALBUMIN SERPL BCG-MCNC: 1.8 G/DL (ref 3.5–5.2)
ALBUMIN UR-MCNC: NEGATIVE MG/DL
ALLEN'S TEST: ABNORMAL
ALLEN'S TEST: ABNORMAL
ALP SERPL-CCNC: 59 U/L (ref 40–150)
ALP SERPL-CCNC: 62 U/L (ref 40–150)
ALP SERPL-CCNC: 67 U/L (ref 40–150)
ALT SERPL W P-5'-P-CCNC: 14 U/L (ref 0–50)
ALT SERPL W P-5'-P-CCNC: 15 U/L (ref 0–50)
ALT SERPL W P-5'-P-CCNC: 16 U/L (ref 0–50)
ANION GAP SERPL CALCULATED.3IONS-SCNC: 11 MMOL/L (ref 7–15)
ANION GAP SERPL CALCULATED.3IONS-SCNC: 12 MMOL/L (ref 7–15)
ANION GAP SERPL CALCULATED.3IONS-SCNC: 7 MMOL/L (ref 7–15)
APPEARANCE UR: ABNORMAL
AST SERPL W P-5'-P-CCNC: 20 U/L (ref 0–45)
AST SERPL W P-5'-P-CCNC: 22 U/L (ref 0–45)
AST SERPL W P-5'-P-CCNC: 25 U/L (ref 0–45)
ATRIAL RATE - MUSE: 74 BPM
BASE EXCESS BLDA CALC-SCNC: 2.5 MMOL/L (ref -3–3)
BASE EXCESS BLDA CALC-SCNC: 2.8 MMOL/L (ref -3–3)
BASE EXCESS BLDA CALC-SCNC: 2.9 MMOL/L (ref -3–3)
BASE EXCESS BLDA CALC-SCNC: 3.1 MMOL/L (ref -3–3)
BILIRUB SERPL-MCNC: 0.3 MG/DL
BILIRUB SERPL-MCNC: 0.3 MG/DL
BILIRUB SERPL-MCNC: 0.4 MG/DL
BILIRUB UR QL STRIP: NEGATIVE
BUN SERPL-MCNC: 44.1 MG/DL (ref 8–23)
BUN SERPL-MCNC: 46.7 MG/DL (ref 8–23)
BUN SERPL-MCNC: 52.9 MG/DL (ref 8–23)
CA-I BLD-MCNC: 4.3 MG/DL (ref 4.4–5.2)
CA-I BLD-MCNC: 4.7 MG/DL (ref 4.4–5.2)
CALCIUM SERPL-MCNC: 7.8 MG/DL (ref 8.8–10.4)
CALCIUM SERPL-MCNC: 8.2 MG/DL (ref 8.8–10.4)
CALCIUM SERPL-MCNC: 8.2 MG/DL (ref 8.8–10.4)
CHLORIDE SERPL-SCNC: 101 MMOL/L (ref 98–107)
CHLORIDE SERPL-SCNC: 104 MMOL/L (ref 98–107)
CHLORIDE SERPL-SCNC: 105 MMOL/L (ref 98–107)
COLOR UR AUTO: ABNORMAL
CREAT SERPL-MCNC: 1.84 MG/DL (ref 0.51–0.95)
CREAT SERPL-MCNC: 1.84 MG/DL (ref 0.51–0.95)
CREAT SERPL-MCNC: 1.96 MG/DL (ref 0.51–0.95)
DIASTOLIC BLOOD PRESSURE - MUSE: NORMAL MMHG
EGFRCR SERPLBLD CKD-EPI 2021: 28 ML/MIN/1.73M2
EGFRCR SERPLBLD CKD-EPI 2021: 31 ML/MIN/1.73M2
EGFRCR SERPLBLD CKD-EPI 2021: 31 ML/MIN/1.73M2
ERYTHROCYTE [DISTWIDTH] IN BLOOD BY AUTOMATED COUNT: 14.6 % (ref 10–15)
ERYTHROCYTE [DISTWIDTH] IN BLOOD BY AUTOMATED COUNT: 14.6 % (ref 10–15)
GLUCOSE BLDC GLUCOMTR-MCNC: 101 MG/DL (ref 70–99)
GLUCOSE BLDC GLUCOMTR-MCNC: 115 MG/DL (ref 70–99)
GLUCOSE BLDC GLUCOMTR-MCNC: 116 MG/DL (ref 70–99)
GLUCOSE BLDC GLUCOMTR-MCNC: 116 MG/DL (ref 70–99)
GLUCOSE BLDC GLUCOMTR-MCNC: 117 MG/DL (ref 70–99)
GLUCOSE BLDC GLUCOMTR-MCNC: 129 MG/DL (ref 70–99)
GLUCOSE BLDC GLUCOMTR-MCNC: 137 MG/DL (ref 70–99)
GLUCOSE BLDC GLUCOMTR-MCNC: 144 MG/DL (ref 70–99)
GLUCOSE BLDC GLUCOMTR-MCNC: 147 MG/DL (ref 70–99)
GLUCOSE BLDC GLUCOMTR-MCNC: 150 MG/DL (ref 70–99)
GLUCOSE BLDC GLUCOMTR-MCNC: 151 MG/DL (ref 70–99)
GLUCOSE BLDC GLUCOMTR-MCNC: 152 MG/DL (ref 70–99)
GLUCOSE BLDC GLUCOMTR-MCNC: 92 MG/DL (ref 70–99)
GLUCOSE SERPL-MCNC: 128 MG/DL (ref 70–99)
GLUCOSE SERPL-MCNC: 150 MG/DL (ref 70–99)
GLUCOSE SERPL-MCNC: 90 MG/DL (ref 70–99)
GLUCOSE UR STRIP-MCNC: 70 MG/DL
HCO3 BLD-SCNC: 27 MMOL/L (ref 21–28)
HCO3 BLD-SCNC: 27 MMOL/L (ref 21–28)
HCO3 BLD-SCNC: 28 MMOL/L (ref 21–28)
HCO3 BLD-SCNC: 29 MMOL/L (ref 21–28)
HCO3 SERPL-SCNC: 24 MMOL/L (ref 22–29)
HCO3 SERPL-SCNC: 26 MMOL/L (ref 22–29)
HCO3 SERPL-SCNC: 28 MMOL/L (ref 22–29)
HCT VFR BLD AUTO: 36.5 % (ref 35–47)
HCT VFR BLD AUTO: 36.7 % (ref 35–47)
HGB BLD-MCNC: 12 G/DL (ref 11.7–15.7)
HGB BLD-MCNC: 12.3 G/DL (ref 11.7–15.7)
HGB UR QL STRIP: ABNORMAL
INR PPP: 1.24 (ref 0.85–1.15)
INTERPRETATION ECG - MUSE: NORMAL
KETONES UR STRIP-MCNC: NEGATIVE MG/DL
LACTATE SERPL-SCNC: 1 MMOL/L (ref 0.7–2)
LEUKOCYTE ESTERASE UR QL STRIP: ABNORMAL
MAGNESIUM SERPL-MCNC: 1.7 MG/DL (ref 1.7–2.3)
MAGNESIUM SERPL-MCNC: 1.8 MG/DL (ref 1.7–2.3)
MAGNESIUM SERPL-MCNC: 1.9 MG/DL (ref 1.7–2.3)
MCH RBC QN AUTO: 29.8 PG (ref 26.5–33)
MCH RBC QN AUTO: 31 PG (ref 26.5–33)
MCHC RBC AUTO-ENTMCNC: 32.7 G/DL (ref 31.5–36.5)
MCHC RBC AUTO-ENTMCNC: 33.7 G/DL (ref 31.5–36.5)
MCV RBC AUTO: 91 FL (ref 78–100)
MCV RBC AUTO: 92 FL (ref 78–100)
NITRATE UR QL: NEGATIVE
O2/TOTAL GAS SETTING VFR VENT: 50 %
O2/TOTAL GAS SETTING VFR VENT: 50 %
O2/TOTAL GAS SETTING VFR VENT: 70 %
O2/TOTAL GAS SETTING VFR VENT: 70 %
OXYHGB MFR BLDA: 90 % (ref 92–100)
OXYHGB MFR BLDA: 93 % (ref 92–100)
OXYHGB MFR BLDA: 93 % (ref 92–100)
OXYHGB MFR BLDA: 94 % (ref 92–100)
P AXIS - MUSE: 66 DEGREES
PCO2 BLD: 37 MM HG (ref 35–45)
PCO2 BLD: 40 MM HG (ref 35–45)
PCO2 BLD: 42 MM HG (ref 35–45)
PCO2 BLD: 48 MM HG (ref 35–45)
PEEP: 5 CM H2O
PEEP: 5 CM H2O
PEEP: 8 CM H2O
PEEP: 8 CM H2O
PH BLD: 7.39 [PH] (ref 7.35–7.45)
PH BLD: 7.42 [PH] (ref 7.35–7.45)
PH BLD: 7.44 [PH] (ref 7.35–7.45)
PH BLD: 7.47 [PH] (ref 7.35–7.45)
PH UR STRIP: 5 [PH] (ref 5–7)
PHOSPHATE SERPL-MCNC: 5.6 MG/DL (ref 2.5–4.5)
PLATELET # BLD AUTO: 249 10E3/UL (ref 150–450)
PLATELET # BLD AUTO: 283 10E3/UL (ref 150–450)
PO2 BLD: 58 MM HG (ref 80–105)
PO2 BLD: 68 MM HG (ref 80–105)
PO2 BLD: 71 MM HG (ref 80–105)
PO2 BLD: 72 MM HG (ref 80–105)
POTASSIUM SERPL-SCNC: 4.1 MMOL/L (ref 3.4–5.3)
POTASSIUM SERPL-SCNC: 4.1 MMOL/L (ref 3.4–5.3)
POTASSIUM SERPL-SCNC: 4.4 MMOL/L (ref 3.4–5.3)
PR INTERVAL - MUSE: 182 MS
PROCALCITONIN SERPL IA-MCNC: 0.34 NG/ML
PROT SERPL-MCNC: 4.6 G/DL (ref 6.4–8.3)
PROT SERPL-MCNC: 4.8 G/DL (ref 6.4–8.3)
PROT SERPL-MCNC: 4.9 G/DL (ref 6.4–8.3)
QRS DURATION - MUSE: 106 MS
QT - MUSE: 402 MS
QTC - MUSE: 446 MS
R AXIS - MUSE: 58 DEGREES
RBC # BLD AUTO: 3.97 10E6/UL (ref 3.8–5.2)
RBC # BLD AUTO: 4.03 10E6/UL (ref 3.8–5.2)
RBC URINE: 36 /HPF
SAO2 % BLDA: 91.3 % (ref 96–97)
SAO2 % BLDA: 93.6 % (ref 96–97)
SAO2 % BLDA: 94.1 % (ref 96–97)
SAO2 % BLDA: 95 % (ref 96–97)
SODIUM SERPL-SCNC: 139 MMOL/L (ref 135–145)
SODIUM SERPL-SCNC: 139 MMOL/L (ref 135–145)
SODIUM SERPL-SCNC: 140 MMOL/L (ref 135–145)
SP GR UR STRIP: 1.01 (ref 1–1.03)
SQUAMOUS EPITHELIAL: 10 /HPF
SYSTOLIC BLOOD PRESSURE - MUSE: NORMAL MMHG
T AXIS - MUSE: 60 DEGREES
UFH PPP CHRO-ACNC: 0.17 IU/ML
UFH PPP CHRO-ACNC: 0.36 IU/ML
UFH PPP CHRO-ACNC: 0.61 IU/ML
UROBILINOGEN UR STRIP-MCNC: NORMAL MG/DL
VENTRICULAR RATE- MUSE: 74 BPM
WBC # BLD AUTO: 10.7 10E3/UL (ref 4–11)
WBC # BLD AUTO: 13.2 10E3/UL (ref 4–11)
WBC URINE: 3 /HPF

## 2025-03-31 PROCEDURE — 82805 BLOOD GASES W/O2 SATURATION: CPT | Performed by: INTERNAL MEDICINE

## 2025-03-31 PROCEDURE — 87486 CHLMYD PNEUM DNA AMP PROBE: CPT | Performed by: INTERNAL MEDICINE

## 2025-03-31 PROCEDURE — 72192 CT PELVIS W/O DYE: CPT

## 2025-03-31 PROCEDURE — 250N000009 HC RX 250: Performed by: INTERNAL MEDICINE

## 2025-03-31 PROCEDURE — 87449 NOS EACH ORGANISM AG IA: CPT | Performed by: INTERNAL MEDICINE

## 2025-03-31 PROCEDURE — 999N000157 HC STATISTIC RCP TIME EA 10 MIN

## 2025-03-31 PROCEDURE — 87106 FUNGI IDENTIFICATION YEAST: CPT | Performed by: INTERNAL MEDICINE

## 2025-03-31 PROCEDURE — 85610 PROTHROMBIN TIME: CPT | Performed by: INTERNAL MEDICINE

## 2025-03-31 PROCEDURE — 99291 CRITICAL CARE FIRST HOUR: CPT | Mod: 25 | Performed by: INTERNAL MEDICINE

## 2025-03-31 PROCEDURE — 36592 COLLECT BLOOD FROM PICC: CPT | Performed by: INTERNAL MEDICINE

## 2025-03-31 PROCEDURE — 87581 M.PNEUMON DNA AMP PROBE: CPT | Performed by: INTERNAL MEDICINE

## 2025-03-31 PROCEDURE — 200N000001 HC R&B ICU

## 2025-03-31 PROCEDURE — 87116 MYCOBACTERIA CULTURE: CPT | Performed by: INTERNAL MEDICINE

## 2025-03-31 PROCEDURE — 94003 VENT MGMT INPAT SUBQ DAY: CPT

## 2025-03-31 PROCEDURE — 83735 ASSAY OF MAGNESIUM: CPT | Performed by: INTERNAL MEDICINE

## 2025-03-31 PROCEDURE — 82330 ASSAY OF CALCIUM: CPT | Performed by: STUDENT IN AN ORGANIZED HEALTH CARE EDUCATION/TRAINING PROGRAM

## 2025-03-31 PROCEDURE — 71045 X-RAY EXAM CHEST 1 VIEW: CPT

## 2025-03-31 PROCEDURE — 36592 COLLECT BLOOD FROM PICC: CPT | Performed by: STUDENT IN AN ORGANIZED HEALTH CARE EDUCATION/TRAINING PROGRAM

## 2025-03-31 PROCEDURE — 31624 DX BRONCHOSCOPE/LAVAGE: CPT

## 2025-03-31 PROCEDURE — 250N000013 HC RX MED GY IP 250 OP 250 PS 637: Performed by: INTERNAL MEDICINE

## 2025-03-31 PROCEDURE — 71250 CT THORAX DX C-: CPT

## 2025-03-31 PROCEDURE — 81001 URINALYSIS AUTO W/SCOPE: CPT | Performed by: INTERNAL MEDICINE

## 2025-03-31 PROCEDURE — 10061 I&D ABSCESS COMP/MULTIPLE: CPT | Performed by: SURGERY

## 2025-03-31 PROCEDURE — 85520 HEPARIN ASSAY: CPT | Performed by: INTERNAL MEDICINE

## 2025-03-31 PROCEDURE — 250N000011 HC RX IP 250 OP 636: Performed by: INTERNAL MEDICINE

## 2025-03-31 PROCEDURE — 87305 ASPERGILLUS AG IA: CPT | Performed by: INTERNAL MEDICINE

## 2025-03-31 PROCEDURE — 0B9D8ZX DRAINAGE OF RIGHT MIDDLE LUNG LOBE, VIA NATURAL OR ARTIFICIAL OPENING ENDOSCOPIC, DIAGNOSTIC: ICD-10-PCS | Performed by: INTERNAL MEDICINE

## 2025-03-31 PROCEDURE — 84155 ASSAY OF PROTEIN SERUM: CPT | Performed by: STUDENT IN AN ORGANIZED HEALTH CARE EDUCATION/TRAINING PROGRAM

## 2025-03-31 PROCEDURE — 85014 HEMATOCRIT: CPT | Performed by: INTERNAL MEDICINE

## 2025-03-31 PROCEDURE — 84145 PROCALCITONIN (PCT): CPT | Performed by: INTERNAL MEDICINE

## 2025-03-31 PROCEDURE — 82330 ASSAY OF CALCIUM: CPT | Performed by: INTERNAL MEDICINE

## 2025-03-31 PROCEDURE — 999N000065 XR ABDOMEN PORT 1 VIEW

## 2025-03-31 PROCEDURE — 99239 HOSP IP/OBS DSCHRG MGMT >30: CPT | Performed by: INTERNAL MEDICINE

## 2025-03-31 PROCEDURE — 82040 ASSAY OF SERUM ALBUMIN: CPT | Performed by: INTERNAL MEDICINE

## 2025-03-31 PROCEDURE — 99222 1ST HOSP IP/OBS MODERATE 55: CPT | Performed by: NURSE PRACTITIONER

## 2025-03-31 PROCEDURE — 999N000009 HC STATISTIC AIRWAY CARE

## 2025-03-31 PROCEDURE — 87205 SMEAR GRAM STAIN: CPT | Performed by: INTERNAL MEDICINE

## 2025-03-31 PROCEDURE — 99221 1ST HOSP IP/OBS SF/LOW 40: CPT | Mod: 25 | Performed by: SURGERY

## 2025-03-31 PROCEDURE — 87899 AGENT NOS ASSAY W/OPTIC: CPT | Performed by: INTERNAL MEDICINE

## 2025-03-31 PROCEDURE — 87798 DETECT AGENT NOS DNA AMP: CPT | Performed by: INTERNAL MEDICINE

## 2025-03-31 PROCEDURE — 87385 HISTOPLASMA CAPSUL AG IA: CPT | Performed by: INTERNAL MEDICINE

## 2025-03-31 PROCEDURE — 81003 URINALYSIS AUTO W/O SCOPE: CPT | Performed by: INTERNAL MEDICINE

## 2025-03-31 PROCEDURE — 5A1935Z RESPIRATORY VENTILATION, LESS THAN 24 CONSECUTIVE HOURS: ICD-10-PCS | Performed by: INTERNAL MEDICINE

## 2025-03-31 PROCEDURE — 94640 AIRWAY INHALATION TREATMENT: CPT

## 2025-03-31 PROCEDURE — 36600 WITHDRAWAL OF ARTERIAL BLOOD: CPT

## 2025-03-31 PROCEDURE — 83605 ASSAY OF LACTIC ACID: CPT | Performed by: INTERNAL MEDICINE

## 2025-03-31 PROCEDURE — 87081 CULTURE SCREEN ONLY: CPT | Performed by: INTERNAL MEDICINE

## 2025-03-31 PROCEDURE — 94640 AIRWAY INHALATION TREATMENT: CPT | Mod: 76

## 2025-03-31 PROCEDURE — 83735 ASSAY OF MAGNESIUM: CPT | Performed by: STUDENT IN AN ORGANIZED HEALTH CARE EDUCATION/TRAINING PROGRAM

## 2025-03-31 PROCEDURE — 0B9F8ZX DRAINAGE OF RIGHT LOWER LUNG LOBE, VIA NATURAL OR ARTIFICIAL OPENING ENDOSCOPIC, DIAGNOSTIC: ICD-10-PCS | Performed by: INTERNAL MEDICINE

## 2025-03-31 PROCEDURE — 258N000003 HC RX IP 258 OP 636: Performed by: INTERNAL MEDICINE

## 2025-03-31 PROCEDURE — 84300 ASSAY OF URINE SODIUM: CPT | Performed by: INTERNAL MEDICINE

## 2025-03-31 PROCEDURE — 84540 ASSAY OF URINE/UREA-N: CPT | Performed by: INTERNAL MEDICINE

## 2025-03-31 PROCEDURE — 31624 DX BRONCHOSCOPE/LAVAGE: CPT | Performed by: INTERNAL MEDICINE

## 2025-03-31 PROCEDURE — 89050 BODY FLUID CELL COUNT: CPT | Performed by: INTERNAL MEDICINE

## 2025-03-31 PROCEDURE — 80053 COMPREHEN METABOLIC PANEL: CPT | Performed by: INTERNAL MEDICINE

## 2025-03-31 PROCEDURE — 250N000011 HC RX IP 250 OP 636: Mod: JZ | Performed by: INTERNAL MEDICINE

## 2025-03-31 PROCEDURE — 87102 FUNGUS ISOLATION CULTURE: CPT | Performed by: INTERNAL MEDICINE

## 2025-03-31 PROCEDURE — 87210 SMEAR WET MOUNT SALINE/INK: CPT | Performed by: INTERNAL MEDICINE

## 2025-03-31 PROCEDURE — 84100 ASSAY OF PHOSPHORUS: CPT | Performed by: INTERNAL MEDICINE

## 2025-03-31 PROCEDURE — 272N000272 HC CONTINUOUS NEBULIZER MICRO PUMP

## 2025-03-31 PROCEDURE — 94668 MNPJ CHEST WALL SBSQ: CPT

## 2025-03-31 RX ORDER — NICOTINE POLACRILEX 4 MG
15-30 LOZENGE BUCCAL
Status: ACTIVE | OUTPATIENT
Start: 2025-03-31

## 2025-03-31 RX ORDER — NALOXONE HYDROCHLORIDE 0.4 MG/ML
0.2 INJECTION, SOLUTION INTRAMUSCULAR; INTRAVENOUS; SUBCUTANEOUS
Status: ACTIVE | OUTPATIENT
Start: 2025-03-31

## 2025-03-31 RX ORDER — HYDROMORPHONE HCL IN WATER/PF 6 MG/30 ML
0.4 PATIENT CONTROLLED ANALGESIA SYRINGE INTRAVENOUS ONCE
Status: COMPLETED | OUTPATIENT
Start: 2025-03-31 | End: 2025-03-31

## 2025-03-31 RX ORDER — ROPIVACAINE IN 0.9% SOD CHL/PF 0.1 %
.01-.2 PLASTIC BAG, INJECTION (ML) EPIDURAL CONTINUOUS
Status: DISCONTINUED | OUTPATIENT
Start: 2025-03-31 | End: 2025-03-31 | Stop reason: HOSPADM

## 2025-03-31 RX ORDER — POLYETHYLENE GLYCOL 3350 17 G/17G
17 POWDER, FOR SOLUTION ORAL DAILY PRN
Status: ACTIVE | OUTPATIENT
Start: 2025-03-31

## 2025-03-31 RX ORDER — PROCHLORPERAZINE MALEATE 10 MG
10 TABLET ORAL EVERY 6 HOURS PRN
Status: ACTIVE | OUTPATIENT
Start: 2025-03-31

## 2025-03-31 RX ORDER — FLUCONAZOLE 2 MG/ML
200 INJECTION, SOLUTION INTRAVENOUS EVERY 24 HOURS
Status: DISCONTINUED | OUTPATIENT
Start: 2025-03-31 | End: 2025-04-01

## 2025-03-31 RX ORDER — CHLORHEXIDINE GLUCONATE ORAL RINSE 1.2 MG/ML
15 SOLUTION DENTAL EVERY 12 HOURS
Status: DISPENSED | OUTPATIENT
Start: 2025-03-31

## 2025-03-31 RX ORDER — VANCOMYCIN HYDROCHLORIDE 1 G/200ML
1000 INJECTION, SOLUTION INTRAVENOUS EVERY 24 HOURS
Status: DISCONTINUED | OUTPATIENT
Start: 2025-04-01 | End: 2025-04-03

## 2025-03-31 RX ORDER — SODIUM CHLORIDE FOR INHALATION 3 %
3 VIAL, NEBULIZER (ML) INHALATION EVERY 12 HOURS
Status: DISCONTINUED | OUTPATIENT
Start: 2025-03-31 | End: 2025-04-01

## 2025-03-31 RX ORDER — PIPERACILLIN SODIUM, TAZOBACTAM SODIUM 3; .375 G/15ML; G/15ML
3.38 INJECTION, POWDER, LYOPHILIZED, FOR SOLUTION INTRAVENOUS EVERY 8 HOURS
Status: DISPENSED | OUTPATIENT
Start: 2025-03-31

## 2025-03-31 RX ORDER — DEXTROSE MONOHYDRATE 100 MG/ML
INJECTION, SOLUTION INTRAVENOUS CONTINUOUS PRN
Status: ACTIVE | OUTPATIENT
Start: 2025-03-31

## 2025-03-31 RX ORDER — NICOTINE POLACRILEX 4 MG
15-30 LOZENGE BUCCAL
Status: DISCONTINUED | OUTPATIENT
Start: 2025-03-31 | End: 2025-04-02

## 2025-03-31 RX ORDER — DEXTROSE MONOHYDRATE 25 G/50ML
25-50 INJECTION, SOLUTION INTRAVENOUS
Status: ACTIVE | OUTPATIENT
Start: 2025-03-31

## 2025-03-31 RX ORDER — AMOXICILLIN 250 MG
1 CAPSULE ORAL 2 TIMES DAILY PRN
Status: ACTIVE | OUTPATIENT
Start: 2025-03-31

## 2025-03-31 RX ORDER — HYDROMORPHONE HCL IN WATER/PF 6 MG/30 ML
0.2 PATIENT CONTROLLED ANALGESIA SYRINGE INTRAVENOUS
Status: DISCONTINUED | OUTPATIENT
Start: 2025-03-31 | End: 2025-03-31 | Stop reason: HOSPADM

## 2025-03-31 RX ORDER — NOREPINEPHRINE BITARTRATE 0.02 MG/ML
.01-.6 INJECTION, SOLUTION INTRAVENOUS CONTINUOUS
Status: DISCONTINUED | OUTPATIENT
Start: 2025-03-31 | End: 2025-04-03

## 2025-03-31 RX ORDER — FENTANYL CITRATE 50 UG/ML
100 INJECTION, SOLUTION INTRAMUSCULAR; INTRAVENOUS
Status: DISCONTINUED | OUTPATIENT
Start: 2025-03-31 | End: 2025-04-01

## 2025-03-31 RX ORDER — ALBUTEROL SULFATE 0.83 MG/ML
2.5 SOLUTION RESPIRATORY (INHALATION)
Status: DISCONTINUED | OUTPATIENT
Start: 2025-03-31 | End: 2025-04-01

## 2025-03-31 RX ORDER — DEXTROSE MONOHYDRATE 25 G/50ML
25-50 INJECTION, SOLUTION INTRAVENOUS
Status: DISCONTINUED | OUTPATIENT
Start: 2025-03-31 | End: 2025-04-02

## 2025-03-31 RX ORDER — FORMOTEROL FUMARATE 20 UG/2ML
20 SOLUTION RESPIRATORY (INHALATION)
Status: DISCONTINUED | OUTPATIENT
Start: 2025-04-01 | End: 2025-04-01

## 2025-03-31 RX ORDER — HEPARIN SODIUM 10000 [USP'U]/100ML
0-5000 INJECTION, SOLUTION INTRAVENOUS CONTINUOUS
Status: DISCONTINUED | OUTPATIENT
Start: 2025-03-31 | End: 2025-04-02

## 2025-03-31 RX ORDER — LIDOCAINE HYDROCHLORIDE 20 MG/ML
JELLY TOPICAL
Status: ACTIVE | OUTPATIENT
Start: 2025-03-31

## 2025-03-31 RX ORDER — NALOXONE HYDROCHLORIDE 0.4 MG/ML
0.4 INJECTION, SOLUTION INTRAMUSCULAR; INTRAVENOUS; SUBCUTANEOUS
Status: ACTIVE | OUTPATIENT
Start: 2025-03-31

## 2025-03-31 RX ORDER — ALBUTEROL SULFATE 0.83 MG/ML
2.5 SOLUTION RESPIRATORY (INHALATION)
Status: DISPENSED | OUTPATIENT
Start: 2025-03-31

## 2025-03-31 RX ORDER — ONDANSETRON 2 MG/ML
4 INJECTION INTRAMUSCULAR; INTRAVENOUS EVERY 6 HOURS PRN
Status: DISPENSED | OUTPATIENT
Start: 2025-03-31

## 2025-03-31 RX ORDER — AMOXICILLIN 250 MG
2 CAPSULE ORAL 2 TIMES DAILY PRN
Status: ACTIVE | OUTPATIENT
Start: 2025-03-31

## 2025-03-31 RX ORDER — BUDESONIDE 0.5 MG/2ML
0.5 INHALANT ORAL
Status: DISCONTINUED | OUTPATIENT
Start: 2025-03-31 | End: 2025-04-01

## 2025-03-31 RX ORDER — DEXMEDETOMIDINE HYDROCHLORIDE 4 UG/ML
.1-1.2 INJECTION, SOLUTION INTRAVENOUS CONTINUOUS
Status: DISCONTINUED | OUTPATIENT
Start: 2025-03-31 | End: 2025-04-01

## 2025-03-31 RX ORDER — FORMOTEROL FUMARATE 20 UG/2ML
20 SOLUTION RESPIRATORY (INHALATION)
Status: DISCONTINUED | OUTPATIENT
Start: 2025-03-31 | End: 2025-03-31

## 2025-03-31 RX ORDER — BISACODYL 10 MG
10 SUPPOSITORY, RECTAL RECTAL DAILY PRN
Status: ACTIVE | OUTPATIENT
Start: 2025-03-31

## 2025-03-31 RX ORDER — ONDANSETRON 4 MG/1
4 TABLET, ORALLY DISINTEGRATING ORAL EVERY 6 HOURS PRN
Status: ACTIVE | OUTPATIENT
Start: 2025-03-31

## 2025-03-31 RX ORDER — MAGNESIUM SULFATE HEPTAHYDRATE 40 MG/ML
2 INJECTION, SOLUTION INTRAVENOUS ONCE
Status: COMPLETED | OUTPATIENT
Start: 2025-03-31 | End: 2025-04-01

## 2025-03-31 RX ADMIN — IPRATROPIUM BROMIDE AND ALBUTEROL SULFATE 3 ML: .5; 3 SOLUTION RESPIRATORY (INHALATION) at 11:23

## 2025-03-31 RX ADMIN — PANTOPRAZOLE SODIUM 40 MG: 40 INJECTION, POWDER, FOR SOLUTION INTRAVENOUS at 08:24

## 2025-03-31 RX ADMIN — PIPERACILLIN AND TAZOBACTAM 3.38 G: 3; .375 INJECTION, POWDER, FOR SOLUTION INTRAVENOUS at 14:16

## 2025-03-31 RX ADMIN — PIPERACILLIN AND TAZOBACTAM 3.38 G: 3; .375 INJECTION, POWDER, FOR SOLUTION INTRAVENOUS at 08:22

## 2025-03-31 RX ADMIN — Medication 25 MCG: at 08:43

## 2025-03-31 RX ADMIN — FENTANYL CITRATE 100 MCG: 50 INJECTION, SOLUTION INTRAMUSCULAR; INTRAVENOUS at 22:00

## 2025-03-31 RX ADMIN — SODIUM CHLORIDE 2500 MG: 0.9 INJECTION, SOLUTION INTRAVENOUS at 22:43

## 2025-03-31 RX ADMIN — CHLORHEXIDINE GLUCONATE ORAL RINSE 15 ML: 1.2 SOLUTION DENTAL at 08:56

## 2025-03-31 RX ADMIN — HEPARIN SODIUM 1450 UNITS/HR: 10000 INJECTION, SOLUTION INTRAVENOUS at 22:29

## 2025-03-31 RX ADMIN — PROPOFOL 35 MCG/KG/MIN: 10 INJECTION, EMULSION INTRAVENOUS at 11:19

## 2025-03-31 RX ADMIN — PIPERACILLIN AND TAZOBACTAM 3.38 G: 3; .375 INJECTION, POWDER, FOR SOLUTION INTRAVENOUS at 02:52

## 2025-03-31 RX ADMIN — HYDROMORPHONE HYDROCHLORIDE 0.2 MG: 0.2 INJECTION, SOLUTION INTRAMUSCULAR; INTRAVENOUS; SUBCUTANEOUS at 13:06

## 2025-03-31 RX ADMIN — METOLAZONE 5 MG: 2.5 TABLET ORAL at 08:43

## 2025-03-31 RX ADMIN — Medication 25 MCG/HR: at 22:35

## 2025-03-31 RX ADMIN — PROPOFOL 30 MCG/KG/MIN: 10 INJECTION, EMULSION INTRAVENOUS at 16:35

## 2025-03-31 RX ADMIN — INSULIN HUMAN 0.5 UNITS/HR: 1 INJECTION, SOLUTION INTRAVENOUS at 22:21

## 2025-03-31 RX ADMIN — INSULIN HUMAN 6 UNITS/HR: 1 INJECTION, SOLUTION INTRAVENOUS at 09:00

## 2025-03-31 RX ADMIN — PROPOFOL 25 MCG/KG/MIN: 10 INJECTION, EMULSION INTRAVENOUS at 02:53

## 2025-03-31 RX ADMIN — PROPOFOL 25 MCG/KG/MIN: 10 INJECTION, EMULSION INTRAVENOUS at 07:06

## 2025-03-31 RX ADMIN — FENTANYL CITRATE 100 MCG: 50 INJECTION, SOLUTION INTRAMUSCULAR; INTRAVENOUS at 22:20

## 2025-03-31 RX ADMIN — IPRATROPIUM BROMIDE AND ALBUTEROL SULFATE 3 ML: .5; 3 SOLUTION RESPIRATORY (INHALATION) at 15:15

## 2025-03-31 RX ADMIN — MIDAZOLAM HYDROCHLORIDE 4 MG: 1 INJECTION, SOLUTION INTRAMUSCULAR; INTRAVENOUS at 22:00

## 2025-03-31 RX ADMIN — NOREPINEPHRINE BITARTRATE 0.03 MCG/KG/MIN: 0.02 INJECTION, SOLUTION INTRAVENOUS at 10:17

## 2025-03-31 RX ADMIN — HEPARIN SODIUM 1450 UNITS/HR: 10000 INJECTION, SOLUTION INTRAVENOUS at 12:08

## 2025-03-31 RX ADMIN — BUDESONIDE INHALATION 0.5 MG: 0.5 SUSPENSION RESPIRATORY (INHALATION) at 22:54

## 2025-03-31 RX ADMIN — ALBUTEROL SULFATE 2.5 MG: 2.5 SOLUTION RESPIRATORY (INHALATION) at 22:54

## 2025-03-31 RX ADMIN — IPRATROPIUM BROMIDE AND ALBUTEROL SULFATE 3 ML: .5; 3 SOLUTION RESPIRATORY (INHALATION) at 07:21

## 2025-03-31 RX ADMIN — SODIUM CHLORIDE 30 MG/ML INHALATION SOLUTION 3 ML: 30 SOLUTION INHALANT at 22:55

## 2025-03-31 RX ADMIN — HYDROMORPHONE HYDROCHLORIDE 0.4 MG: 0.2 INJECTION, SOLUTION INTRAMUSCULAR; INTRAVENOUS; SUBCUTANEOUS at 08:46

## 2025-03-31 RX ADMIN — PIPERACILLIN AND TAZOBACTAM 3.38 G: 3; .375 INJECTION, POWDER, FOR SOLUTION INTRAVENOUS at 22:33

## 2025-03-31 RX ADMIN — DEXMEDETOMIDINE HYDROCHLORIDE 0.2 MCG/KG/HR: 400 INJECTION INTRAVENOUS at 22:28

## 2025-03-31 RX ADMIN — BUMETANIDE 0.5 MG: 0.25 INJECTION INTRAMUSCULAR; INTRAVENOUS at 08:25

## 2025-03-31 ASSESSMENT — ACTIVITIES OF DAILY LIVING (ADL)
ADLS_ACUITY_SCORE: 70
ADLS_ACUITY_SCORE: 74
ADLS_ACUITY_SCORE: 70
ADLS_ACUITY_SCORE: 63
ADLS_ACUITY_SCORE: 70
ADLS_ACUITY_SCORE: 70
ADLS_ACUITY_SCORE: 74
ADLS_ACUITY_SCORE: 70
ADLS_ACUITY_SCORE: 74
ADLS_ACUITY_SCORE: 63
ADLS_ACUITY_SCORE: 70
ADLS_ACUITY_SCORE: 74
ADLS_ACUITY_SCORE: 74
ADLS_ACUITY_SCORE: 70

## 2025-03-31 NOTE — PLAN OF CARE
Pt remains intubated, Vent settings SIMV FiO2 70%, , RR 20, PEEP of 5, Pressure support 8. ETT 21 at the gums, OG 57 at the lips, continuous Osmolite infusing at goal rate. Triple lumen CVC intact, blood return to all lumens. Heparin, insulin and Propofol infusing per MAR. Landrum catheter intact. Loose stool x1 this shift. Was found to have area of puss near rectum, provider updated and picture in chart. Repositioned and oral cares completed q2hrs. Frequent rounding.        Face to face report given with opportunity to observe patient.    Report given to Zahida RN    Tamara Rivas RN   3/31/2025  8:57 AM

## 2025-03-31 NOTE — CONSULTS
INPATIENT ROUNDING NOTE  3/31/2025    Patient: Marly Cannon    Physician of Record: Hospitalist Service    Admitting diagnosis: Diabetic ketosis (H) [E11.10]  PAF (paroxysmal atrial fibrillation) (H) [I48.0]  Pneumonia [J18.9]  Hyperglycemia [R73.9]  Acute and chronic respiratory failure with hypoxia (H) [J96.21]  Diabetic ketoacidosis without coma associated with type 2 diabetes mellitus (H) [E11.10]  Single subsegmental pulmonary embolism without acute cor pulmonale (H) [I26.93]    Length of stay: 9    Patient seen for buttock/gluteal wounds.    CURRENT MEDICATIONS:  Continuous Medications:  Current Facility-Administered Medications   Medication Dose Route Frequency Provider Last Rate Last Admin    acetaminophen (TYLENOL) tablet 325 mg  325 mg Oral or Feeding Tube Q6H PRN Bibi Cancino MD   325 mg at 03/30/25 0813    albuterol (PROVENTIL HFA/VENTOLIN HFA) inhaler  2 puff Inhalation Daily PRN Te Parish MD   2 puff at 03/28/25 0437    [Held by provider] aspirin (ASA) chewable tablet 81 mg  81 mg Oral Daily Te Parish MD   81 mg at 03/24/25 0948    [Held by provider] atorvastatin (LIPITOR) tablet 40 mg  40 mg Oral At Bedtime Te Parish MD   40 mg at 03/23/25 2301    bumetanide (BUMEX) injection 0.5 mg  0.5 mg Intravenous Q12H Prosper Schulz MD   0.5 mg at 03/31/25 0825    carboxymethylcellulose PF (REFRESH PLUS) 0.5 % ophthalmic solution 1 drop  1 drop Both Eyes Q1H PRN Bibi Cancino MD        chlorhexidine (PERIDEX) 0.12 % solution 15 mL  15 mL Mouth/Throat Q12H Gentry Saez DO   15 mL at 03/31/25 0856    dextrose 10% infusion   Intravenous Continuous PRN Bibi Cancino MD        glucose gel 15-30 g  15-30 g Oral Q15 Min PRN Te Parish MD        Or    dextrose 50 % injection 25-50 mL  25-50 mL Intravenous Q15 Min PRN Te Parish MD        Or    glucagon injection 1 mg  1 mg Subcutaneous Q15 Min PRN Te Parish MD         [Held by provider] empagliflozin (JARDIANCE) tablet 25 mg  25 mg Oral Daily Te Parish MD   25 mg at 03/24/25 0948    fluconazole (DIFLUCAN) intermittent infusion 200 mg  200 mg Intravenous Q24H Bibi Cancino  mL/hr at 03/30/25 1857 200 mg at 03/30/25 1857    heparin 25,000 units in 0.45% NaCl 250 mL ANTICOAGULANT infusion  0-5,000 Units/hr Intravenous Continuous Bibi Cancino MD 14.5 mL/hr at 03/31/25 1208 1,450 Units/hr at 03/31/25 1208    HOLD: All Oral Medications   Does not apply HOLD Bibi Cancino MD        HYDROmorphone (DILAUDID) injection 0.2 mg  0.2 mg Intravenous Q2H PRN Donnie Cotter MD        [Held by provider] insulin glargine U-300 (TOUJEO) injection 20 Units  20 Units Subcutaneous QAM Te Parish MD   20 Units at 03/23/25 0911    insulin regular (MYXREDLIN) 1 unit/mL infusion  0-24 Units/hr Intravenous Continuous Bibi Cancino MD 6 mL/hr at 03/31/25 0900 6 Units/hr at 03/31/25 0900    ipratropium - albuterol 0.5 mg/2.5 mg/3 mL (DUONEB) neb solution 3 mL  3 mL Nebulization 4x daily Delmy Harris MD   3 mL at 03/31/25 1123    propofol (DIPRIVAN) infusion  5-75 mcg/kg/min Intravenous Continuous Prosper Schulz MD 22.6 mL/hr at 03/31/25 1119 35 mcg/kg/min at 03/31/25 1119    And    propofol (DIPRIVAN) bolus from bag or syringe pump  10 mg Intravenous Q15 Min PRN Prosper Schulz MD        And    Medication Instruction   Does not apply Continuous PRN Prosper Schulz MD        metolazone (ZAROXOLYN) tablet 5 mg  5 mg Per OG Tube Daily Prosepr Schulz MD   5 mg at 03/31/25 0843    naloxone (NARCAN) injection 0.2 mg  0.2 mg Intravenous Q2 Min PRN Bastianelli, Gentry P, DO        Or    naloxone (NARCAN) injection 0.4 mg  0.4 mg Intravenous Q2 Min PRN Gentry Saez P, DO        Or    naloxone (NARCAN) injection 0.2 mg  0.2 mg Intramuscular Q2 Min PRN Gentry Saez P, DO        Or    naloxone (NARCAN) injection 0.4 mg  0.4 mg Intramuscular Q2 Min  PRN Gentry Saez DO        norepinephrine (LEVOPHED) 4 mg in  mL PERIPHERAL infusion  0.01-0.2 mcg/kg/min Intravenous Continuous Gentry Saez DO   Stopped at 03/31/25 1035    pantoprazole (PROTONIX) 2 mg/mL suspension 40 mg  40 mg Per Feeding Tube QAM AC Gentry Saez P DO        Or    pantoprazole (PROTONIX) IV push injection 40 mg  40 mg Intravenous QAM AC Gentry Saez DO   40 mg at 03/31/25 0824    Patient is already receiving anticoagulation with heparin, enoxaparin (LOVENOX), warfarin (COUMADIN)  or other anticoagulant medication   Does not apply Continuous PRN Te Parish MD        piperacillin-tazobactam (ZOSYN) 3.375 g vial to attach to  mL bag  3.375 g Intravenous Q6H Bibi Cancino MD   3.375 g at 03/31/25 0822    primidone (MYSOLINE) tablet 50 mg  50 mg Oral or Feeding Tube At Bedtime Bibi Cancino MD   50 mg at 03/30/25 2237    Vitamin D3 (CHOLECALCIFEROL) tablet 25 mcg  25 mcg Oral or Feeding Tube Daily Bibi Cancino MD   25 mcg at 03/31/25 0843       Scheduled Medications:  Current Facility-Administered Medications   Medication Dose Route Frequency Provider Last Rate Last Admin    acetaminophen (TYLENOL) tablet 325 mg  325 mg Oral or Feeding Tube Q6H PRN Bibi Cancino MD   325 mg at 03/30/25 0813    albuterol (PROVENTIL HFA/VENTOLIN HFA) inhaler  2 puff Inhalation Daily PRN Te Parish MD   2 puff at 03/28/25 0437    [Held by provider] aspirin (ASA) chewable tablet 81 mg  81 mg Oral Daily Te Parish MD   81 mg at 03/24/25 0948    [Held by provider] atorvastatin (LIPITOR) tablet 40 mg  40 mg Oral At Bedtime Te Parish MD   40 mg at 03/23/25 2301    bumetanide (BUMEX) injection 0.5 mg  0.5 mg Intravenous Q12H Prosper Schulz MD   0.5 mg at 03/31/25 0825    carboxymethylcellulose PF (REFRESH PLUS) 0.5 % ophthalmic solution 1 drop  1 drop Both Eyes Q1H PRN Bibi Cancino MD        chlorhexidine  (PERIDEX) 0.12 % solution 15 mL  15 mL Mouth/Throat Q12H Gentry Saez DO   15 mL at 03/31/25 0856    dextrose 10% infusion   Intravenous Continuous PRN Bibi Cancino MD        glucose gel 15-30 g  15-30 g Oral Q15 Min PRN Te Parish MD        Or    dextrose 50 % injection 25-50 mL  25-50 mL Intravenous Q15 Min PRN Te Parish MD        Or    glucagon injection 1 mg  1 mg Subcutaneous Q15 Min PRN Te Parish MD        [Held by provider] empagliflozin (JARDIANCE) tablet 25 mg  25 mg Oral Daily Te Parish MD   25 mg at 03/24/25 0948    fluconazole (DIFLUCAN) intermittent infusion 200 mg  200 mg Intravenous Q24H Bibi Cancino  mL/hr at 03/30/25 1857 200 mg at 03/30/25 1857    heparin 25,000 units in 0.45% NaCl 250 mL ANTICOAGULANT infusion  0-5,000 Units/hr Intravenous Continuous Bibi Cancino MD 14.5 mL/hr at 03/31/25 1208 1,450 Units/hr at 03/31/25 1208    HOLD: All Oral Medications   Does not apply HOLD Bibi Cancino MD        HYDROmorphone (DILAUDID) injection 0.2 mg  0.2 mg Intravenous Q2H PRN Donnie Cotter MD        [Held by provider] insulin glargine U-300 (TOUJEO) injection 20 Units  20 Units Subcutaneous QAM Te Parish MD   20 Units at 03/23/25 0911    insulin regular (MYXREDLIN) 1 unit/mL infusion  0-24 Units/hr Intravenous Continuous Bibi Cancino MD 6 mL/hr at 03/31/25 0900 6 Units/hr at 03/31/25 0900    ipratropium - albuterol 0.5 mg/2.5 mg/3 mL (DUONEB) neb solution 3 mL  3 mL Nebulization 4x daily Delmy Harris MD   3 mL at 03/31/25 1123    propofol (DIPRIVAN) infusion  5-75 mcg/kg/min Intravenous Continuous Prosper Schulz MD 22.6 mL/hr at 03/31/25 1119 35 mcg/kg/min at 03/31/25 1119    And    propofol (DIPRIVAN) bolus from bag or syringe pump  10 mg Intravenous Q15 Min PRN Prosper Schulz MD        And    Medication Instruction   Does not apply Continuous PRN Prosper Schulz MD        metolazone (ZAROXOLYN)  tablet 5 mg  5 mg Per OG Tube Daily Prosper Schulz MD   5 mg at 03/31/25 0843    naloxone (NARCAN) injection 0.2 mg  0.2 mg Intravenous Q2 Min PRN BasChester morenoony P, DO        Or    naloxone (NARCAN) injection 0.4 mg  0.4 mg Intravenous Q2 Min PRN BasChester morenoony P, DO        Or    naloxone (NARCAN) injection 0.2 mg  0.2 mg Intramuscular Q2 Min PRN BasChester morenoony P, DO        Or    naloxone (NARCAN) injection 0.4 mg  0.4 mg Intramuscular Q2 Min PRN BasChester morenoony P, DO        norepinephrine (LEVOPHED) 4 mg in  mL PERIPHERAL infusion  0.01-0.2 mcg/kg/min Intravenous Continuous BasChester morenoony P, DO   Stopped at 03/31/25 1035    pantoprazole (PROTONIX) 2 mg/mL suspension 40 mg  40 mg Per Feeding Tube QAM AC Chester Saezony P, DO        Or    pantoprazole (PROTONIX) IV push injection 40 mg  40 mg Intravenous QAM AC Chester Saezony P, DO   40 mg at 03/31/25 0824    Patient is already receiving anticoagulation with heparin, enoxaparin (LOVENOX), warfarin (COUMADIN)  or other anticoagulant medication   Does not apply Continuous PRN Te Parish MD        piperacillin-tazobactam (ZOSYN) 3.375 g vial to attach to  mL bag  3.375 g Intravenous Q6H Bibi Cancino MD   3.375 g at 03/31/25 0822    primidone (MYSOLINE) tablet 50 mg  50 mg Oral or Feeding Tube At Bedtime Bibi Cancino MD   50 mg at 03/30/25 2237    Vitamin D3 (CHOLECALCIFEROL) tablet 25 mcg  25 mcg Oral or Feeding Tube Daily Bibi Cancino MD   25 mcg at 03/31/25 0843       PRN Medications:  Current Facility-Administered Medications   Medication Dose Route Frequency Provider Last Rate Last Admin    acetaminophen (TYLENOL) tablet 325 mg  325 mg Oral or Feeding Tube Q6H PRN Bibi Cancino MD   325 mg at 03/30/25 0813    albuterol (PROVENTIL HFA/VENTOLIN HFA) inhaler  2 puff Inhalation Daily PRN Te Parish MD   2 puff at 03/28/25 3786    [Held by provider] aspirin (ASA) chewable tablet 81  mg  81 mg Oral Daily Te Parish MD   81 mg at 03/24/25 0948    [Held by provider] atorvastatin (LIPITOR) tablet 40 mg  40 mg Oral At Bedtime Te Parish MD   40 mg at 03/23/25 2301    bumetanide (BUMEX) injection 0.5 mg  0.5 mg Intravenous Q12H Prosper Schulz MD   0.5 mg at 03/31/25 0825    carboxymethylcellulose PF (REFRESH PLUS) 0.5 % ophthalmic solution 1 drop  1 drop Both Eyes Q1H PRN Bibi Cancino MD        chlorhexidine (PERIDEX) 0.12 % solution 15 mL  15 mL Mouth/Throat Q12H Gentry Saez DO   15 mL at 03/31/25 0856    dextrose 10% infusion   Intravenous Continuous PRN Bibi Cancino MD        glucose gel 15-30 g  15-30 g Oral Q15 Min PRN Te Parish MD        Or    dextrose 50 % injection 25-50 mL  25-50 mL Intravenous Q15 Min PRN Te Parish MD        Or    glucagon injection 1 mg  1 mg Subcutaneous Q15 Min PRN Te Parish MD        [Held by provider] empagliflozin (JARDIANCE) tablet 25 mg  25 mg Oral Daily Te Parish MD   25 mg at 03/24/25 0948    fluconazole (DIFLUCAN) intermittent infusion 200 mg  200 mg Intravenous Q24H Bibi Cancino  mL/hr at 03/30/25 1857 200 mg at 03/30/25 1857    heparin 25,000 units in 0.45% NaCl 250 mL ANTICOAGULANT infusion  0-5,000 Units/hr Intravenous Continuous Bibi Cancino MD 14.5 mL/hr at 03/31/25 1208 1,450 Units/hr at 03/31/25 1208    HOLD: All Oral Medications   Does not apply HOLD Bibi Cancino MD        HYDROmorphone (DILAUDID) injection 0.2 mg  0.2 mg Intravenous Q2H PRN Donnie Cotter MD        [Held by provider] insulin glargine U-300 (TOUJEO) injection 20 Units  20 Units Subcutaneous QADIMA Parish Te, MD   20 Units at 03/23/25 0911    insulin regular (MYXREDLIN) 1 unit/mL infusion  0-24 Units/hr Intravenous Continuous Bibi Cancino MD 6 mL/hr at 03/31/25 0900 6 Units/hr at 03/31/25 0900    ipratropium - albuterol 0.5 mg/2.5 mg/3 mL (DUONEB) neb solution 3 mL  3  mL Nebulization 4x daily Delmy Harris MD   3 mL at 03/31/25 1123    propofol (DIPRIVAN) infusion  5-75 mcg/kg/min Intravenous Continuous Prosper Schulz MD 22.6 mL/hr at 03/31/25 1119 35 mcg/kg/min at 03/31/25 1119    And    propofol (DIPRIVAN) bolus from bag or syringe pump  10 mg Intravenous Q15 Min PRN Prosper Schulz MD        And    Medication Instruction   Does not apply Continuous PRN Prosper Schulz MD        metolazone (ZAROXOLYN) tablet 5 mg  5 mg Per OG Tube Daily Prosper Schulz MD   5 mg at 03/31/25 0843    naloxone (NARCAN) injection 0.2 mg  0.2 mg Intravenous Q2 Min PRN Se Gentry P, DO        Or    naloxone (NARCAN) injection 0.4 mg  0.4 mg Intravenous Q2 Min PRN Bastiffanie Gentry P, DO        Or    naloxone (NARCAN) injection 0.2 mg  0.2 mg Intramuscular Q2 Min PRN Bastiffanie Gentry P, DO        Or    naloxone (NARCAN) injection 0.4 mg  0.4 mg Intramuscular Q2 Min PRN BasChester morenoony P, DO        norepinephrine (LEVOPHED) 4 mg in  mL PERIPHERAL infusion  0.01-0.2 mcg/kg/min Intravenous Continuous Bastianelli Gentry P, DO   Stopped at 03/31/25 1035    pantoprazole (PROTONIX) 2 mg/mL suspension 40 mg  40 mg Per Feeding Tube QAM AC Bastianelli Gentry P, DO        Or    pantoprazole (PROTONIX) IV push injection 40 mg  40 mg Intravenous QAM AC Bastianelli Gentry P, DO   40 mg at 03/31/25 0824    Patient is already receiving anticoagulation with heparin, enoxaparin (LOVENOX), warfarin (COUMADIN)  or other anticoagulant medication   Does not apply Continuous PRN Te Parish MD        piperacillin-tazobactam (ZOSYN) 3.375 g vial to attach to  mL bag  3.375 g Intravenous Q6H Bibi Cancino MD   3.375 g at 03/31/25 0822    primidone (MYSOLINE) tablet 50 mg  50 mg Oral or Feeding Tube At Bedtime Bibi Cancino MD   50 mg at 03/30/25 7895    Vitamin D3 (CHOLECALCIFEROL) tablet 25 mcg  25 mcg Oral or Feeding Tube Daily Bibi Cancino MD   25 mcg  "at 03/31/25 0843       SUBJECTIVE:   Patient is intubated and sedated so unable to answer questions.    PHYSICAL EXAM:   Vital signs: /52   Pulse 73   Temp 98.1  F (36.7  C) (Tympanic)   Resp 23   Ht 1.651 m (5' 5\")   Wt 109.4 kg (241 lb 2.9 oz)   SpO2 92%   BMI 40.13 kg/m     BMI: Body mass index is 40.13 kg/m .   General: in no acute distress   Buttock: intact blister to right buttock, granular open wound noted to left buttock; right groin with redness, swelling, and open wound with purulent drainage noted concerning for abscess versus fistula    INPUT/OUTPUT:      Intake/Output Summary (Last 24 hours) at 3/31/2025 1217  Last data filed at 3/31/2025 1000  Gross per 24 hour   Intake 2617.8 ml   Output 6225 ml   Net -3607.2 ml       I/O last 3 completed shifts:  In: 3055.8 [I.V.:1790.8; NG/GT:980]  Out: 6645 [Urine:6645]    LABS:    Last CBC Rrsults:   Recent Labs   Lab Test 03/31/25  0449 03/30/25  0445 03/29/25  0509   WBC 13.2* 16.3* 20.5*   RBC 3.97 4.17 4.56   HGB 12.3 12.8 14.0   HCT 36.5 38.7 42.1   MCV 92 93 92   MCH 31.0 30.7 30.7   MCHC 33.7 33.1 33.3   RDW 14.6 14.8 14.8    234 217       Last Comprehensive Metabolic panel:  Recent Labs   Lab Test 03/31/25  1108 03/31/25  0855 03/31/25  0702 03/31/25  0501 03/31/25  0449 03/30/25  0640 03/30/25  0445 03/29/25  0547 03/29/25  0509   NA  --   --   --   --  140  --  142  --  139   POTASSIUM  --   --   --   --  4.1  --  4.2  --  3.9   CHLORIDE  --   --   --   --  105  --  109*  --  110*   CO2  --   --   --   --  24  --  21*  --  18*   ANIONGAP  --   --   --   --  11  --  12  --  11   * 150* 144*   < > 150*   < > 138*   < > 155*   BUN  --   --   --   --  52.9*  --  61.6*  --  60.8*   CR  --   --   --   --  1.96*  --  2.09*  --  2.01*   GFRESTIMATED  --   --   --   --  28*  --  26*  --  28*   NORM  --   --   --   --  8.2*  --  8.0*  --  8.2*   BILITOTAL  --   --   --   --  0.4  --  0.4  --  0.3   ALKPHOS  --   --   --   --  67  --  74  " --  76   ALT  --   --   --   --  15  --  17  --  18   AST  --   --   --   --  22  --  24  --  26    < > = values in this interval not displayed.       Recent Labs   Lab Test 03/31/25  0449 03/30/25  0445 03/29/25  0509 03/26/25  0324 03/25/25  0406 03/24/25  0426 03/23/25  2145 03/23/25  1826   MAG 1.8 1.9 2.0   < > 2.2  --  2.2  --    ALBUMIN 1.8* 1.7* 1.8*   < > 2.0*   < >  --   --    PHOS  --   --   --   --  3.0  --  5.3* 5.9*    < > = values in this interval not displayed.       ASSESSMENT:    59 year old female admitted for Diabetic ketosis (H) [E11.10]  PAF (paroxysmal atrial fibrillation) (H) [I48.0]  Pneumonia [J18.9]  Hyperglycemia [R73.9]  Acute and chronic respiratory failure with hypoxia (H) [J96.21]  Diabetic ketoacidosis without coma associated with type 2 diabetes mellitus (H) [E11.10]  Single subsegmental pulmonary embolism without acute cor pulmonale (H) [I26.93].  Seen for wounds, abscess/fistula. Hospital day 9.      PLAN: Treat bilateral buttock wounds with mepilex 4x4 border pads changed every 3 days or as needed due to soiling/saturation.  Dr. Daniel Oropeza was consulted on patient's right groin for concern of abscess versus fistula. Orders for treatment of this area per Dr. Oropeza.

## 2025-03-31 NOTE — PROVIDER NOTIFICATION
Provider notified: ART line pressures 90/40s, MAP 58-59. Last cuff pressure was 101/50 with MAP 63     Levophed ordered.

## 2025-03-31 NOTE — PROGRESS NOTES
Sp02 92% on 70% Fio2. BS coarse to clear. 1cc air added to cuff due to leak. Sxn for small amount thin pale yellow secretions. Pt rested comfortably t/o shift.     03/30/25 0313    Ventilator Settings    Vent Mode SIMV   Resp Rate (Set) 20 breaths/min   Tidal Volume (Set, mL) 450 mL   FiO2 65 %   PEEP (cm H2O) 5 cmH2O

## 2025-03-31 NOTE — DISCHARGE SUMMARY
"[facility]  Hospitalist Discharge Summary      Date of Admission:  3/22/2025  Date of Discharge:  3/31/2025  Discharging Provider: eGntry Saez DO  Discharge Service: Hospitalist Service    Discharge Diagnoses   Acute hypoxic hypercapnic respiratory failure.  Pulmonary embolus.  Pleural effusions.  Right middle lobe collapse.  Suspected pneumonia  Shock, currently resolved but did require pressors.  Encephalopathy toxic versus metabolic.  A-fib with RVR along with SVT  Uncontrolled type 2 diabetes mellitus with anion gap.  Acute on chronic renal failure  COPD, severity unknown oxygen dependent on 2 L nasal cannula established at last admission      Clinically Significant Risk Factors     # DMII: A1C = 12.7 % (Ref range: <5.7 %) within past 6 months  # Severe Obesity: Estimated body mass index is 40.13 kg/m  as calculated from the following:    Height as of this encounter: 1.651 m (5' 5\").    Weight as of this encounter: 109.4 kg (241 lb 2.9 oz).       Follow-ups Needed After Discharge       Unresulted Labs Ordered in the Past 30 Days of this Admission       No orders found from 2/20/2025 to 3/23/2025.        These results will be followed up by primary care provider after hospital discharge.    Discharge Disposition   [facility]  Dr. Ragsdale.  Condition at discharge: Serious    Hospital Course   Marly is a 59-year-old female with history of hypertension, type 2 diabetes mellitus, intellectual disability with memory loss, per parathyroidism who presented to the emergency department with shortness of breath.  There she was found to have SVT, A-fib with RVR.  Influenza COVID and RSV testing was negative.  CT scan done at time of admission did reveal a left lower lobe subsegmental pulmonary embolism.  Initially the patient was hospitalized on the medical floor.  She had an admission just days prior were diagnosis of oxygen dependent COPD was established and she was " discharged on 2 L at that time however in retrospect this may have been an underlying PE.  Nevertheless with her hospitalization beginning on March 22, 2025 her respiratory status continued to decompensate requiring transition to BiPAP and subsequent transition to endotracheal intubation and ventilator support.  Patient had right IJ central line and arterial line placed.  Initially there was some thought that there was additional clot burden being passed from the lower extremities hands emergent lower extremity ultrasounds were obtained which did not reveal any clot burden however repeat CT of the chest did show progression of her PE.  She was placed on heparin from the time of her admission.  Over the course the last 9 days the patient has remained on the ventilator.  Unfortunately we have not made much progress in weaning her.  Currently FiO2's remain in the 65-70 range.  Repeat imaging did also indicate some right middle lobe collapse.  There is also concern for underlying pneumonia during this hospital stay.  In addition her hospitalization has been complicated by acute on chronic renal failure.  Baseline creatinine was in the 1-1.3 range and currently her creatinine is up to about 2.  In addition she has had episodes of A-fib with RVR along with SVT noted on telemetry.  During this hospitalization patient also did require pressors.  Etiology was a bit unclear.  At 1 point there was thought to be some dilated gallbladder findings.  And some subcutaneous edema.  Most recently she was found to have a small abscess I would asked that you please review the surgery note from earlier today.  She is no longer on pressors however blood pressures remain a bit soft.  Due to the fact she has received significant amount of fluids she has been diuresed the last couple days.  Echocardiogram during this stay was technically difficult however left ventricular function was said to be preserved.  It is noted that her weight has  gone up from about 90 kg to 107 kg during this hospitalization.  In regard to encephalopathy is unclear as to whether or not this was toxic versus metabolic.  Patient has remained intubated and sedated for the last 9 days.  This morning and yesterday she was quite responsive when sedation was lightened however as stated her respiratory status was tenuous as she remained quite tachypneic this morning with rates in the 30s.  Due to her prolonged intubation and plateauing respiratory status and that we have not had any real success in weaning her the last several days discussion was had with telemetry ICU and subsequently with [facility] Dr. Ragsdale as to transfer.  The patient very well may need a trach placed and ongoing gradual weaning.  This was discussed with Tavo her significant other along with her sister Mary today who are agreeable to this transfer.  Patient will be transferred via [facility] air ambulance.  CODE STATUS remains full.    Consultations This Hospital Stay   SPEECH LANGUAGE PATH ADULT IP CONSULT  ANESTHESIOLOGY IP CONSULT  CARE MANAGEMENT / SOCIAL WORK IP CONSULT  PHARMACY IP CONSULT  PHARMACY TO DOSE VANCO  PHARMACY IP CONSULT  PHARMACY IP CONSULT  NUTRITION SERVICES ADULT IP CONSULT  WOUND OSTOMY CONTINENCE NURSE  IP CONSULT    Code Status   Full Code    Time Spent on this Encounter   I, Gentry Saez DO, personally saw the patient today and spent greater than 30 minutes discharging this patient.       Gentry Saez DO  [address]  [address] [place] [id number]  Phone: [phone]  Fax: [phone]  ______________________________________________________________________    Physical Exam   Vital Signs: Temp: 97.8  F (36.6  C) Temp src: Tympanic BP: 123/62 Pulse: 79   Resp: 24 SpO2: 96 % O2 Device: Mechanical Ventilator    Weight: 241 lbs 2.93 oz    General: intubated and lightly sedated.  Heart:  RRR, S1 S2, No murmurs, no rubs, no gallops.  Resp: CTA  bilaterally.  Upper field rhonchi.  Abd: Soft/NT/ND/Positve BS.  Ext: +2 lower extremity edema bilaterally.  Neuro: Responding to verbal commands when sedation was lightened.  Right IJ central line.  Primary Care Physician   Amberly Whyte    Discharge Orders   No discharge procedures on file.    Significant Results and Procedures   Most Recent 3 CBC's:  Recent Labs   Lab Test 03/31/25  0449 03/30/25  0445 03/29/25  0509   WBC 13.2* 16.3* 20.5*   HGB 12.3 12.8 14.0   MCV 92 93 92    234 217     Most Recent 3 BMP's:  Recent Labs   Lab Test 03/31/25  1500 03/31/25  1318 03/31/25  1108 03/31/25  0501 03/31/25  0449 03/30/25  0640 03/30/25  0445 03/29/25  0547 03/29/25  0509   NA  --   --   --   --  140  --  142  --  139   POTASSIUM  --   --   --   --  4.1  --  4.2  --  3.9   CHLORIDE  --   --   --   --  105  --  109*  --  110*   CO2  --   --   --   --  24  --  21*  --  18*   BUN  --   --   --   --  52.9*  --  61.6*  --  60.8*   CR  --   --   --   --  1.96*  --  2.09*  --  2.01*   ANIONGAP  --   --   --   --  11  --  12  --  11   NORM  --   --   --   --  8.2*  --  8.0*  --  8.2*   * 151* 116*   < > 150*   < > 138*   < > 155*    < > = values in this interval not displayed.     Most Recent Hemoglobin A1c:  Recent Labs   Lab Test 03/22/25  1731   A1C 12.7*   ,   Results for orders placed or performed during the hospital encounter of 03/22/25   XR Chest 2 Views    Narrative    EXAM: XR CHEST 2 VIEWS  LOCATION: [facility]  DATE: 3/22/2025    INDICATION: Shortness of breath  COMPARISON: Chest radiograph 3/17/2025.      Impression    IMPRESSION: Stable size of cardiomediastinal silhouette. Questionable new retrocardiac opacity, differential includes atelectasis, aspiration or developing pneumonia. No definite pleural effusion or pneumothorax. Bones are unchanged.   CTA Chest with Contrast     Value    Radiologist flags New diagnosis of pulmonary embolism (AA)    Narrative    EXAM: CTA CHEST WITH  CONTRAST  LOCATION: [facility]  DATE: 3/22/2025    INDICATION: R O PE, shortness of breath  COMPARISON: Same day 2 view chest radiograph, CT chest without contrast 07/21/2023  TECHNIQUE: Helical acquisition through the chest was performed during the arterial phase of contrast enhancement. 2D and 3D reconstructions performed by the CT technologist. Dose reduction techniques were used.  CONTRAST: Isovue 370 66mL    CT ANGIOGRAM CHEST: Pulmonary arteries are normal in size. Very small pulmonary artery emboli to subsegmental branches of the left lower lobe. No right heart strain.    LUNGS AND PLEURA: Mild emphysematous changes of the lungs. No focal pulmonary consolidation or pleural effusion.    MEDIASTINUM/AXILLAE: Atherosclerotic calcifications of the aortic arch and descending thoracic aorta. No lymphadenopathy. Trace pericardial effusion. Small sliding-type hiatal hernia.    CORONARY ARTERY CALCIFICATION: Mild.    UPPER ABDOMEN: Mildly thickened adrenal glands.    MUSCULOSKELETAL: Degenerative changes of the shoulders and thoracic spine. No acute osseous abnormality.      Impression    IMPRESSION:  1.  Very small pulmonary artery emboli to subsegmental branches of the left lower lobe. No right heart strain.  2.  Mild emphysematous changes of the lungs. No focal pulmonary consolidation or pleural effusion.  3.  Trace pericardial effusion.  4.  Small sliding-type hiatal hernia.      [Critical Result: New diagnosis of pulmonary embolism]    Finding was identified on 3/22/2025 9:51 PM CDT.     Dr. Andrez Rob was contacted by me on 3/22/2025 10:03 PM CDT and verbalized understanding of the critical result.     XR Chest Port 1 View    Narrative    EXAM: XR CHEST PORT 1 VIEW  LOCATION: [facility]  DATE: 3/23/2025    INDICATION: increased hypoxia  COMPARISON: 3/22/2025.      Impression    IMPRESSION: No significant interval change from 3/22/2025.    XR Chest 1 View    Narrative    EXAM: XR CHEST  1 VIEW  LOCATION: [facility]  DATE: 3/23/2025    INDICATION: new intubation  COMPARISON: Same day chest radiograph      Impression    IMPRESSION: Normal size of cardiomediastinal silhouette. Endotracheal tube with tip 5.6 cm above the carlos. Right internal jugular approach central venous catheter with tip overlying the superior vena cava. Likely right lower lobe atelectasis. No   definite airspace consolidation, pleural effusion or pneumothorax. Bones are unchanged.   XR Chest Port 1 View    Narrative    EXAM: XR CHEST PORT 1 VIEW  LOCATION: [facility]  DATE: 3/23/2025    INDICATION: hypoxia  COMPARISON: 3/23/2025 at 1809 hours      Impression    IMPRESSION: Endotracheal tube terminates 5.5 cm above the carlos. Right-sided central venous catheter terminates over the mid SVC. Heart size is normal. Right lower lobe collapse redemonstrated. Upper lobes are clear. No pneumothorax.   US Lower Extremity Venous Duplex Bilateral    Narrative    EXAM: US LOWER EXTREMITY VENOUS DUPLEX BILATERAL  LOCATION: [facility]  DATE: 3/23/2025    INDICATION: Assess for DVTs as to explain likelyhood of additional PEs   Unstable and cannot got doing for RE study at this time.  COMPARISON: None.  TECHNIQUE: Venous Duplex ultrasound of bilateral lower extremities with and without compression, augmentation and duplex. Color flow and spectral Doppler with waveform analysis performed. Difficult exam with poor visualization of calf veins.    FINDINGS: Exam includes the common femoral, femoral, popliteal veins as well as segmentally visualized deep calf veins and greater saphenous vein.     RIGHT: No deep vein thrombosis. No superficial thrombophlebitis. No popliteal cyst.    LEFT: No deep vein thrombosis. No superficial thrombophlebitis. No popliteal cyst. Focal subcutaneous edema in the upper calf. No well-defined collection.      Impression    IMPRESSION:  1.  No deep venous thrombosis in the visualized  portions of the bilateral lower extremities.   XR Abdomen Port 1 View    Narrative    EXAM: XR ABDOMEN PORT 1 VIEW  LOCATION: [facility]  DATE: 3/23/2025    INDICATION: OG placement  COMPARISON: None.      Impression    IMPRESSION: NG tube in stomach. Nonobstructive bowel gas pattern in the included upper abdomen.   CT Head w/o Contrast    Narrative    Exam: CT HEAD W/O CONTRAST    Clinical history:59 years Female Hypoxia    Comparisons: 4/26/2009 MRI 12/5/2019    Technique: Axial CT imaging of the head was performed Without  intervenous contrast.  This exam was performed using one or more of the following dose  reduction techniques:  Automated exposure control, adjustment of the BRY and/or KV according  to patient's size, and/or use of iterative reconstruction technique.    FINDINGS:   Ventricles and sulci are symmetric. The gray-white matter  differentiation throughout the brain is well maintained. There is no  evidence of intracranial mass or hemorrhage. Visualized portions of  the paranasal sinuses and mastoid air cells are well aerated.   There  is no evidence of skull fracture.      Impression    IMPRESSION: Negative Head CT    WARREN BULLARD MD         SYSTEM ID:  [id number]   CTA Chest with Contrast    Narrative    CTA CHEST WITH CONTRAST    HISTORY: 59 years Female PE-ass whether additional PEs    TECHNIQUE: Axial CT imaging of the chest was performed with  intervenous contrast during arterial phase of enhancement. Multiplanar  reconstructions and 3-D MIP reconstructions were obtained on a 3-D  workstation.  This exam was performed using one or more of the following dose  reduction techniques:  Automated exposure control, adjustment of the BRY and/or KV according  to patient's size, and/or use of iterative reconstruction technique.    COMPARISON: 3/22/2025    FINDINGS:  The patient is intubated. The tip of the endotracheal tube is at the  level of the aortic arch, well above the carlos. A  nasogastric tube is  present. The tip is in the stomach.    There is a right internal jugular central venous catheter.      There is no mediastinal or hilar or axillary lymphadenopathy.      There is now completely atelectasis of the right lower lobe. There is  associated volume loss. There is nonopacification of bronchi within  the right lower lobe. There is atelectasis at the periphery of the  right middle lobe at the right lung base. There is very mild  atelectasis posterior left lower lobe.    There is a filling defect within segmental arteries of the right lower  lobe. There is some lung parenchyma that does not enhance as avidly as  adjacent parenchyma within the right lower lobe.         No concerning osseous lesions are identified      Impression    IMPRESSION: Exam positive for pulmonary embolism. There has been  progression of emboli as seen previously. There is now involvement a  segmental and subsegmental arteries of the right lower lobe. There is  now atelectasis of the entire right lower lobe with volume loss. There  is some abnormal enhancement within portions of the right lower lobe  which may be sequela of infarction/ischemia.    There is opacification of bronchi within the right lower lobe. Mucous  plugging may be present.12    WARREN BULLARD MD         SYSTEM ID:  [id number]   XR Chest Port 1 View    Narrative    PROCEDURE: XR CHEST PORT 1 VIEW 3/24/2025 7:38 AM    HISTORY: Follow up RML atelectasis    COMPARISONS: 3/23/2025.    TECHNIQUE: Single portable view.    FINDINGS: There is a right central venous catheter. There is a  nasogastric tube with its tip distal to the EG junction. Endotracheal  tube remains in place.    Heart size is stable. Left lung and pleural space are relatively  clear.    There is persistent abnormal density at the right lung base consistent  with right lower lobe collapse. There is no some blunting of the right  costophrenic angle and there is probably a small  right-sided effusion  as well.    Calcifications are seen between the acromion process and humeral head,  stable finding.         Impression    IMPRESSION: Persistent right lower lobe collapse with probable small  effusion.    CHYNA SCHREIBER MD         SYSTEM ID:  [id number]   CT Abdomen Pelvis w Contrast    Narrative    Exam:CT ABDOMEN PELVIS W CONTRAST    History: 59 years Female right upper quadrant pain. Diabetic  ketoacidosis. Shock.     Comparisons:    Technique: Axial CT imaging of the abdomen and pelvis was performed  with contrast. Coronal and sagittal reconstructions were obtained.   This exam was performed using one or more of the following dose  reduction techniques:  Automated exposure control, adjustment of the BRY and/or KV according  to patient's size, and/or use of iterative reconstruction technique.       FINDINGS:    Abdomen:  Liver:Unremarkable  Gallbladder and biliary tree: The gallbladder is distended. There is  substance of slightly greater density within the gallbladder lumen  layering dependently. No calcified gallstones are present. There is no  intrahepatic biliary dilatation. The extra hepatic bile duct is 6 mm.  Pancreas:Unremarkable  Spleen:Unremarkable  Adrenals:Normal    Kidneys and ureters:Unremarkable    Lymph nodes:There is no significant lymphadenopathy    Bowel:No abnormally distended or thickened loops of bowel are present.  There is no evidence of bowel obstruction.    Appendix: There are no inflammatory changes in the right lower  quadrant.    Vessels: There is calcified atherosclerotic disease of the abdominal  aorta without evidence of aneurysm.    Osseous structures:Unremarkable    Pelvis: There is a small right inguinal hernia containing omental fat.  No loops of bowel are involved. There is subcutaneous edema in the  lower anterior abdominal wall and in the right labia.    There is subcutaneous edema in the dependent portions of the abdomen  and pelvis.             Impression    IMPRESSION: There is gallbladder distention. A small amount of  gallbladder sludge is possible but no calcified gallstones are seen  and there is no biliary dilatation.    There is diffuse subcutaneous edema.    Small right inguinal hernia containing fat. No loops of bowel are  involved.    WARREN BULLARD MD         SYSTEM ID:  [id number]   XR Chest Port 1 View    Narrative    PROCEDURE:  XR CHEST PORT 1 VIEW    HISTORY: intubated    COMPARISON: CT chest 3/24/2025    FINDINGS: Single view chest radiograph  Right internal jugular central venous catheter tip projects over the  SVC. Gastric tube sidehole projects over the stomach, within normal  limits, courses inferior to the field-of-view. Endotracheal tube tip  projects over the midthoracic trachea, within normal limits.  Cardiomediastinal silhouette is within normal limits.  Perihilar and peripheral interstitial opacities. Wedge-shaped opacity  in the right lower lobe, consistent with right lower lobe atelectasis.  Bilateral pleural effusions. No pneumothorax.    No suspicious osseous lesion or subdiaphragmatic free air.      Impression    IMPRESSION:    Stable pulmonary opacities and support devices.    JULIA STODDARD MD         SYSTEM ID:  [id number]   XR Chest Port 1 View    Narrative    EXAM: XR CHEST PORT 1 VIEW  [facility]: [facility]  DATE: 3/26/2025    INDICATION: intubated  COMPARISON: 3/23/2025, 3/25/2025      Impression    IMPRESSION: Stable cardiomediastinal contours. Endotracheal tube tip 6 cm above the carlos. Enteric tube terminates below the diaphragm. Right IJ central venous catheter tip at mid SVC level. Similar appearance of right lower lobe collapse and left   basilar atelectasis or airspace disease. Small left pleural effusion. No visible pneumothorax.   XR Chest Port 1 View    Narrative    EXAM: XR CHEST PORT 1 VIEW  LOCATION: [facility]  DATE: 3/27/2025    INDICATION: intubated  COMPARISON: 03/26/2025       Impression    IMPRESSION: Endotracheal tube 5.2 cm above carlos. Right IJ line. Enteric tube in stomach. Bibasilar atelectasis or infiltrates and pleural effusions.   XR Chest Port 1 View    Narrative    EXAM: XR CHEST PORT 1 VIEW  [facility]ATION: [facility]  DATE: 3/28/2025    INDICATION: intubated  COMPARISON: Multiple priors, most recent 3/27/2025      Impression    IMPRESSION: Endotracheal tube tip in good position 7 cm above the carlos. Enteric tube extending below level the EG junction off the inferior aspect of the image. Right IJ central venous catheter with tip in the mid SVC. Mild right basilar atelectasis   and pleural fluid. Atelectasis medial left lung base.   XR Chest Port 1 View    Narrative    EXAM: XR CHEST PORT 1 VIEW  LOCATION: [facility]  DATE: 3/29/2025    INDICATION: intubated  COMPARISON: 03/28/2025      Impression    IMPRESSION: Endotracheal tube 3.5 cm above carlos. Enteric tube below lower aspect of film. Right IJ line tip SVC. Stable cardiomediastinal silhouette. Bibasilar atelectasis or infiltrate and pleural effusions similar.   XR Chest Port 1 View    Narrative    EXAM: XR CHEST PORT 1 VIEW  LOCATION: [facility]  DATE: 3/29/2025    INDICATION: increasing oxygen requirements  COMPARISON: Earlier today at 6:00 AM      Impression    IMPRESSION: Endotracheal tube tip remains 5.5 cm above the carlos. Right IJ central venous catheter tip in the mid SVC. Enteric tube coursing into the stomach, tip outside the field-of-view. Stable small to moderate bilateral pleural effusions. Slight   increase in atelectasis in the perihilar right lung and otherwise stable bibasilar atelectasis. No pneumothorax. Normal heart size.   XR Chest Port 1 View    Narrative    EXAM: XR CHEST PORT 1 VIEW  LOCATION: [facility]  DATE: 3/30/2025    INDICATION: intubated  COMPARISON: 3/29/2025      Impression    IMPRESSION: Stable endotracheal tube, right IJ central venous  catheter, and enteric tube. No pneumothorax. Increase in size of moderate bilateral pleural effusions with adjacent atelectasis. Normal heart size.   XR Chest Port 1 View    Narrative    PROCEDURE:  XR CHEST PORT 1 VIEW    HISTORY:  intubated.     COMPARISON:  3/30/2025    FINDINGS:   The heart is normal in size there is an endotracheal tube with its tip  5 cm above the carlos. The nasogastric tube passes into the stomach.  There is a central venous catheter with its tip in the superior vena  cava. There is some increased density in both lung bases stable from  previous examination. There are probable bilateral pleural effusions  without change      Impression    IMPRESSION:  Stable appearance of the chest from 3/30/2025      NOEMI DAVIS MD         SYSTEM ID:  [id number]   CT Pelvis Soft Tissue wo Contrast    Narrative    CT scan of the pelvis without IV contrast sagittal coronal  reconstructions were obtained.    HISTORY: Right groin abscess.    FINDINGS: there is significant edema in the subcutaneous tissues of  the pelvis and upper thighs that has worsened from previous  examination. There is a right inguinal hernia containing fat. There is  a there is edema seen in the perineum and labia on the right. The  edema has worsened mildly from previous examination.    No bowel abnormalities are seen in the pelvis. There is a catheter  seen within the bladder. The rectum appears normal.      Impression    IMPRESSION: Right inguinal hernia containing fat.    Worsening subcutaneous edema and edema in the perineum and labia on  the right as compared to previous examination.    No discrete abscess is seen.    NOEMI DAVIS MD         SYSTEM ID:  [id number]   Echocardiogram Complete     Value    LVEF  65-70%    Narrative    [id number]  [id number]  [id number]  [id number]^NOE^[facility]  Echocardiography Laboratory  [address] [place] [address]     Name: MATTHEW STEEN  MRN:  [id number]  : 1965  Study Date: 2025 06:38 AM  Age: 59 yrs  Gender: Female  Patient Location: [facility]  Reason For Study: Atrial Fibrillation  Ordering Physician: CHRISSY LIU  Performed By: Sami Odom     BSA: 2.0 m2  Height: 65 in  Weight: 206 lb  ______________________________________________________________________________  Procedure  Echocardiogram with two-dimensional, color and spectral Doppler. Technically  difficult study. Patient on ventilator and tachycardic.  ______________________________________________________________________________  Interpretation Summary     There is no pericardial effusion.  Hyperdynamic left ventricular function  The visual ejection fraction is 65-70%.  Normal right ventricle structure and size  The left atrium is moderately dilated.  No aortic stenosis is present.  The tricuspid valve is not well visualized.  ______________________________________________________________________________  Left Ventricle  Hyperdynamic left ventricular function. The visual ejection fraction is 65-  70%.     Right Ventricle  Normal right ventricle structure and size.     Atria  The left atrium is moderately dilated.     Mitral Valve  The mitral valve is not well visualized.     Tricuspid Valve  The tricuspid valve is not well visualized.     Aortic Valve  The mean AoV pressure gradient is 4.4 mmHg. The peak AoV pressure gradient is  6.2 mmHg. No aortic stenosis is present.     Pericardium  There is no pericardial effusion.     Rhythm  The rhythm was rapid atrial fibrillation.     ______________________________________________________________________________  MMode/2D Measurements & Calculations  IVSd: 0.84 cm  LVIDd: 3.1 cm  LVIDs: 2.2 cm  LVPWd: 0.79 cm  FS: 28.8 %  LV mass(C)d: 64.8 grams  LV mass(C)dI: 32.4 grams/m2     Ao root diam: 2.4 cm  LA dimension: 2.4 cm  LA/Ao: 1.0  LVOT diam: 2.0 cm  LVOT area: 3.3 cm2  Ao root diam index Ht(cm/m): 1.4  Ao root diam index BSA  (cm/m2): 1.2  EF Biplane: 75.2 %  RWT: 0.50  TAPSE: 1.4 cm     Doppler Measurements & Calculations  MV E max juan: 81.0 cm/sec  MV A max juan: 60.0 cm/sec  MV E/A: 1.4  MV dec slope: 959.0 cm/sec2  MV dec time: 0.08 sec  Ao V2 max: 124.0 cm/sec  Ao max P.2 mmHg  Ao V2 mean: 102.0 cm/sec  Ao mean P.4 mmHg  Ao V2 VTI: 18.0 cm  JAY(I,D): 3.5 cm2  JAY(V,D): 3.7 cm2  LV V1 max P.8 mmHg  LV V1 max: 140.0 cm/sec  LV V1 VTI: 19.0 cm  SV(LVOT): 62.6 ml  SI(LVOT): 31.3 ml/m2  PA V2 max: 59.0 cm/sec  PA max P.4 mmHg  AV Juan Ratio (DI): 1.1     JAY Index (cm2/m2): 1.7  E/E' avg: 10.3  Lateral E/e': 11.1  Medial E/e': 9.4     ______________________________________________________________________________  Report approved by: Prosper Schulz MD on 2025 07:12 AM             Discharge Medications   Current Discharge Medication List        CONTINUE these medications which have NOT CHANGED    Details   acetaminophen (TYLENOL) 325 MG tablet Take 325 mg by mouth every 6 hours as needed for mild pain.      acyclovir (ZOVIRAX) 400 MG tablet Take 1 tablet (400 mg) by mouth 3 times daily for 7 days.  Qty: 21 tablet, Refills: 0    Associated Diagnoses: Genital herpes simplex, unspecified site      albuterol (PROAIR HFA/PROVENTIL HFA/VENTOLIN HFA) 108 (90 Base) MCG/ACT inhaler Inhale 2 puffs into the lungs daily as needed for shortness of breath.      amLODIPine (NORVASC) 5 MG tablet Take 1 tablet (5 mg) by mouth daily  Qty: 90 tablet, Refills: 3    Associated Diagnoses: Essential hypertension      aspirin (ASPIRIN LOW DOSE) 81 MG chewable tablet CHEW AND SWALLOW 1 TABLET BY MOUTH DAILY  Qty: 90 tablet, Refills: 1    Associated Diagnoses: Essential hypertension      atorvastatin (LIPITOR) 40 MG tablet TAKE 1 TABLET BY MOUTH AT BEDTIME  Qty: 90 tablet, Refills: 2    Associated Diagnoses: Hyperlipidemia, unspecified hyperlipidemia type      blood glucose (ONETOUCH ULTRA) test strip USE TO TEST BLOOD SUGAR 4 TIMES  DAILY  Qty: 100 strip, Refills: 4    Associated Diagnoses: Type 2 diabetes mellitus with hyperglycemia, with long-term current use of insulin (H)      Cholecalciferol (VITAMIN D3) 50 MCG (2000 UT) CAPS TAKE 1 CAPSULE BY MOUTH DAILY  Qty: 90 capsule, Refills: 0    Associated Diagnoses: Vitamin D deficiency      !! Continuous Glucose Sensor (FREESTYLE HANK 2 PLUS SENSOR) MISC 1 each every 14 days.  Qty: 2 each, Refills: 5    Associated Diagnoses: Type 2 diabetes mellitus with hyperglycemia, with long-term current use of insulin (H)      empagliflozin (JARDIANCE) 25 MG TABS tablet Take 1 tablet (25 mg) by mouth daily  Qty: 90 tablet, Refills: 3    Associated Diagnoses: Type 2 diabetes, HbA1c goal < 7% (H)      insulin aspart (NOVOLOG VIAL) 100 UNITS/ML vial TO BE USED WITH INSULIN PUMP, MAX DAILY UNITS 70  Qty: 30 mL, Refills: 3    Associated Diagnoses: Type 2 diabetes mellitus with hyperglycemia, with long-term current use of insulin (H)      !! Insulin Disposable Pump (OMNIPOD 5 LIBRE2 PLUS G6 PODS) MISC 1 each every 72 hours.  Qty: 10 each, Refills: 5    Associated Diagnoses: Type 2 diabetes mellitus with hyperglycemia, with long-term current use of insulin (H)      Insulin Disposable Pump (OMNIPOD 5 LIBRE2 PLUS G6) KIT 1 each every 72 hours.  Qty: 1 kit, Refills: 0    Associated Diagnoses: Type 2 diabetes mellitus with hyperglycemia, with long-term current use of insulin (H)      !! Insulin Disposable Pump (OMNIPOD DASH PODS, GEN 4,) MISC Inject 1 pod subcutaneously every 48 hours.  Qty: 15 each, Refills: 5    Associated Diagnoses: Type 2 diabetes mellitus with hyperglycemia, with long-term current use of insulin (H)      insulin glargine U-300 (TOUJEO SOLOSTAR) 300 UNIT/ML (1 units dial) pen Inject 20 Units Subcutaneous At Bedtime  Qty: 9 mL, Refills: 1      INSULIN PUMP - OUTPATIENT Insulin pump  Omnipod Dash  Date: 1/23/25  Basal: 1.3 (new)  Total basal: 31.2   CR:15  ISF: 50  BG target: 120  BG correction:  130  Active insulin: 4 hours    Associated Diagnoses: Type 2 diabetes mellitus with hyperglycemia, with long-term current use of insulin (H)      ipratropium-albuterol (COMBIVENT RESPIMAT)  MCG/ACT inhaler INHALE 1 PUFF INTO THE LUNGS 4 TIMES DAILY  Qty: 4 g, Refills: 2    Associated Diagnoses: Pulmonary emphysema, unspecified emphysema type (H)      ketoconazole (NIZORAL) 2 % external cream Apply topically daily  Qty: 60 g, Refills: 1    Associated Diagnoses: Seborrheic dermatitis      lisinopril (ZESTRIL) 40 MG tablet TAKE 1 TABLET BY MOUTH DAILY  Qty: 90 tablet, Refills: 1    Associated Diagnoses: Essential hypertension      primidone (MYSOLINE) 50 MG tablet TAKE 1 TABLET BY MOUTH AT BEDTIME  Qty: 30 tablet, Refills: 0    Associated Diagnoses: Essential tremor      tiotropium-olodaterol 2.5-2.5 MCG/ACT AERS Inhale 2 puffs into the lungs daily      triamcinolone (KENALOG) 0.1 % external ointment Apply topically 2 times daily as needed (foot irritation).      !! Continuous Glucose Sensor (DEXCOM G7 SENSOR) MISC 1 each every 10 days. Change sensor every 10 days  Qty: 9 each, Refills: 5    Associated Diagnoses: Type 2 diabetes mellitus with hyperglycemia, with long-term current use of insulin (H)      insulin pen needle (32G X 4 MM) 32G X 4 MM miscellaneous Use 1 pen needles daily  Qty: 100 each, Refills: 3    Associated Diagnoses: Type 2 diabetes mellitus with hyperglycemia, without long-term current use of insulin (H)      OneTouch Delica Lancets 33G MISC Inject 1 each Subcutaneous 3 times daily (before meals)  Qty: 100 each, Refills: 11    Comments: Do you deliver?   Please call patient ([phone]) to let them know.  Thanks  Associated Diagnoses: Type 2 diabetes mellitus with hyperglycemia, with long-term current use of insulin (H)      order for DME Women briefs  Qty: 50 each, Refills: 1    Associated Diagnoses: Female stress incontinence       !! - Potential duplicate medications found. Please discuss  with provider.        Allergies   No Known Allergies

## 2025-03-31 NOTE — PROGRESS NOTES
Patient remains on ventilator with settings at:  Mode: SIMV  RR: 20  VT: 450  FIO2: 70  PEEP: 5  PS: 8  ET tube secured at: 21 Size: 7.5  Cuff checked: Yes   Breath sounds: Dim/clear  SpO2: 92  Suction: small amounts thin cloudy sputum  Weaning trial attempted: No   Ambu bag present: Yes

## 2025-03-31 NOTE — PROGRESS NOTES
"Reviewed CT scan and do not see significant infection tracking up the right groin, did explore wound at bedside and does track aprox 4 cm cephalad. After discussing with  did place betadine on groin and make a counter incision and loop a 1/4 \" penrose drain into the cavity. To keep open and draining.     Gerard Oropeza MD    "

## 2025-03-31 NOTE — PHARMACY
"Pharmacy Antimicrobial Stewardship Assessment     Current Antimicrobial Therapy:  Anti-infectives (From now, onward)      Start     Dose/Rate Route Frequency Ordered Stop    25 1900  fluconazole (DIFLUCAN) intermittent infusion 200 mg         200 mg  100 mL/hr over 60 Minutes Intravenous EVERY 24 HOURS 25 1840      25 1000  piperacillin-tazobactam (ZOSYN) 3.375 g vial to attach to  mL bag        Note to Pharmacy: For [facility], [facility] and [facility]: For Zosyn-naive patients, use the \"Zosyn initial dose + extended infusion\" order panel.    3.375 g  over 30 Minutes Intravenous EVERY 6 HOURS 25 0907              Indication: fluconazole (candidiasis), Zosyn (aspiration pneumonia)    Days of Therapy: day 7 of fluconazole, day 10 of Zosyn     Pertinent Labs:    Recent labs: (last 7 days)     25  1213 25  1432 25  1620 25  2307 25  0406 25  0236 25  0509 25  0445 25  0449   WBC 19.5*   < >  --   --    < > 19.2* 20.5* 16.3* 13.2*   LACT 2.6*  --  2.2* 1.5  --   --   --   --   --    PCAL  --   --   --   --   --   --  0.66*  --   --     < > = values in this interval not displayed.       Temperature:  Temp (24hrs), Av.8  F (36.6  C), Min:97.2  F (36.2  C), Max:98.3  F (36.8  C)      Culture Results:   30-Day Micro Results       Collected Updated Procedure Result Status      2025 0747 2025 0821 Respiratory Aerobic Bacterial Culture with Gram Stain [[id number]]   (Abnormal)   Sputum from Endotracheal    Final result Component Value   Culture 3+ Candida parapsilosis    Susceptibilities not routinely done, refer to antibiogram to view typical susceptibility profiles   Gram Stain Result >25 PMNs/low power field    <10 Squamous epithelial cells/low power field    3+ Yeast    2+ Gram positive cocci in pairs               2025 1624 2025 1819 MRSA MSSA PCR, Nasal Swab [[id number]]    Swab from Nare, Right    Final result Component Value "   MRSA Target DNA Negative   SA Target DNA Negative            03/22/2025 2104 03/28/2025 0554 Blood Culture Peripheral Blood [[id number]]   Peripheral Blood    Final result Component Value   Culture No Growth               03/22/2025 2104 03/28/2025 0554 Blood Culture Peripheral Blood [[id number]]   Peripheral Blood    Final result Component Value   Culture No Growth               03/22/2025 1715 03/22/2025 2014 Influenza A/B, RSV and SARS-CoV2 PCR (COVID-19) Nasopharyngeal [[id number]]    Swab from Nasopharyngeal    Final result Component Value   Influenza A PCR Negative   Influenza B PCR Negative   RSV PCR Negative   SARS CoV2 PCR Negative   NEGATIVE: SARS-CoV-2 (COVID-19) RNA not detected, presumed negative.            03/17/2025 2046 03/23/2025 0616 Blood Culture Arm, Left [[id number]]   Blood from Arm, Left    Final result Component Value   Culture No Growth               03/17/2025 2038 03/17/2025 2119 Influenza A/B, RSV and SARS-CoV2 PCR (COVID-19) Nasopharyngeal [[id number]]    Swab from Nasopharyngeal    Final result Component Value   Influenza A PCR Negative   Influenza B PCR Negative   RSV PCR Negative   SARS CoV2 PCR Negative   NEGATIVE: SARS-CoV-2 (COVID-19) RNA not detected, presumed negative.                Recommendations/Interventions:  1. None at this time.    Mini Palacios, [facility]  March 31, 2025

## 2025-03-31 NOTE — PLAN OF CARE
Right wrist and Left wrist restraints continued 3/31/2025    Clinical Justification: Pulling lines, pulling tubes, and pulling equipment  Less Restrictive Alternative: Repositioning, Re-evaluate equipment, Disguise equipment, Pain management, Alarm  Attending Provider Notified: Yes, Attending Provider's Name: Dr. Saez  New orders placed Yes  Length of Order: 1 Day      Zahida Sanford RN

## 2025-03-31 NOTE — PLAN OF CARE
Patient brought down to CT with writer, RT, and UA. Portable monitor in use. Patient tolerated well, see vitals in flowsheets.

## 2025-03-31 NOTE — CONSULTS
[facility] Surgery Consultation    CC:  Draining woud     HPI:  This 59 year old year old female is seen at the request of Mary Treviño for evaluation of wound. Patient has been admitted since 3/23 for concerns of hypoxia, hyperglycemia found to have PE as well as A-fib with RVR. A1c 12.7 on admission. Wound care was consulted for decubitus wounds noted to have draining sinus. Per discussions with hospitalist team having difficulty with extubation. She was placed on heparin gtt.     Unable to give history due to intubation.     History reviewed. No pertinent past medical history.    Past Surgical History:   Procedure Laterality Date    BIOPSY BREAST Left 02/14/2008    patient states no bx, Clip in left breast/ stereo bxc 2-- no path in EPIC that far back    COLONOSCOPY N/A 08/20/2015    Procedure: COLONOSCOPY;  Surgeon: Gentry Porter MD;  Location: [facility] OR    COLONOSCOPY N/A 09/21/2018    Procedure: COLONOSCOPY;  COLONOSCOPY WITH POLYPECTOMY;  Surgeon: Gentry Porter MD;  Location: [facility] OR       No Known Allergies    Current Facility-Administered Medications   Medication Dose Route Frequency Provider Last Rate Last Admin    acetaminophen (TYLENOL) tablet 325 mg  325 mg Oral or Feeding Tube Q6H PRN Bibi Cancino MD   325 mg at 03/30/25 0813    albuterol (PROVENTIL HFA/VENTOLIN HFA) inhaler  2 puff Inhalation Daily PRN Te Parish MD   2 puff at 03/28/25 0437    [Held by provider] aspirin (ASA) chewable tablet 81 mg  81 mg Oral Daily Te Parish MD   81 mg at 03/24/25 0948    [Held by provider] atorvastatin (LIPITOR) tablet 40 mg  40 mg Oral At Bedtime Te Parish MD   40 mg at 03/23/25 2301    bumetanide (BUMEX) injection 0.5 mg  0.5 mg Intravenous Q12H Prosper Schulz MD   0.5 mg at 03/31/25 0825    carboxymethylcellulose PF (REFRESH PLUS) 0.5 % ophthalmic solution 1 drop  1 drop Both Eyes Q1H PRN Bibi Cancino MD        chlorhexidine (PERIDEX) 0.12 %  solution 15 mL  15 mL Mouth/Throat Q12H Gentry Saez DO   15 mL at 03/31/25 0856    dextrose 10% infusion   Intravenous Continuous PRN Bibi Cancino MD        glucose gel 15-30 g  15-30 g Oral Q15 Min PRN Te Parish MD        Or    dextrose 50 % injection 25-50 mL  25-50 mL Intravenous Q15 Min PRN Te Parish MD        Or    glucagon injection 1 mg  1 mg Subcutaneous Q15 Min PRN Te Parish MD        [Held by provider] empagliflozin (JARDIANCE) tablet 25 mg  25 mg Oral Daily Te Parish MD   25 mg at 03/24/25 0948    fluconazole (DIFLUCAN) intermittent infusion 200 mg  200 mg Intravenous Q24H Bibi Cancino  mL/hr at 03/30/25 1857 200 mg at 03/30/25 1857    heparin 25,000 units in 0.45% NaCl 250 mL ANTICOAGULANT infusion  0-5,000 Units/hr Intravenous Continuous Bibi Cancino MD 14.5 mL/hr at 03/31/25 0850 1,450 Units/hr at 03/31/25 0850    HOLD: All Oral Medications   Does not apply HOLD Bibi Cancino MD        HYDROmorphone (DILAUDID) injection 0.2 mg  0.2 mg Intravenous Q2H PRN Donnie Cotter MD        [Held by provider] insulin glargine U-300 (TOUJEO) injection 20 Units  20 Units Subcutaneous QAM Te Parish MD   20 Units at 03/23/25 0911    insulin regular (MYXREDLIN) 1 unit/mL infusion  0-24 Units/hr Intravenous Continuous Bibi Cancino MD 6 mL/hr at 03/31/25 0900 6 Units/hr at 03/31/25 0900    ipratropium - albuterol 0.5 mg/2.5 mg/3 mL (DUONEB) neb solution 3 mL  3 mL Nebulization 4x daily Delmy Harris MD   3 mL at 03/31/25 1123    propofol (DIPRIVAN) infusion  5-75 mcg/kg/min Intravenous Continuous Prosper Schulz MD 22.6 mL/hr at 03/31/25 1119 35 mcg/kg/min at 03/31/25 1119    And    propofol (DIPRIVAN) bolus from bag or syringe pump  10 mg Intravenous Q15 Min PRN Prosper Schulz MD        And    Medication Instruction   Does not apply Continuous PRN Prosper Schulz MD        metolazone (ZAROXOLYN) tablet 5 mg  5 mg  Per OG Tube Daily Prosper Schulz MD   5 mg at 03/31/25 0843    naloxone (NARCAN) injection 0.2 mg  0.2 mg Intravenous Q2 Min PRN Bastianelli, Gentry P, DO        Or    naloxone (NARCAN) injection 0.4 mg  0.4 mg Intravenous Q2 Min PRN Bastianelli, Gentry P, DO        Or    naloxone (NARCAN) injection 0.2 mg  0.2 mg Intramuscular Q2 Min PRN Bastianelli, Gentry P, DO        Or    naloxone (NARCAN) injection 0.4 mg  0.4 mg Intramuscular Q2 Min PRN Bastianelli, Gentry P, DO        norepinephrine (LEVOPHED) 4 mg in  mL PERIPHERAL infusion  0.01-0.2 mcg/kg/min Intravenous Continuous Bastianelli, Gentry P, DO   Stopped at 03/31/25 1035    pantoprazole (PROTONIX) 2 mg/mL suspension 40 mg  40 mg Per Feeding Tube QAM AC Bastianelli, Gentry P, DO        Or    pantoprazole (PROTONIX) IV push injection 40 mg  40 mg Intravenous QAM AC Bastianelli, Gentry P, DO   40 mg at 03/31/25 0824    Patient is already receiving anticoagulation with heparin, enoxaparin (LOVENOX), warfarin (COUMADIN)  or other anticoagulant medication   Does not apply Continuous PRN Te Parish MD        piperacillin-tazobactam (ZOSYN) 3.375 g vial to attach to  mL bag  3.375 g Intravenous Q6H Bibi Cancino MD   3.375 g at 03/31/25 0822    primidone (MYSOLINE) tablet 50 mg  50 mg Oral or Feeding Tube At Bedtime Bibi Cancino MD   50 mg at 03/30/25 2237    Vitamin D3 (CHOLECALCIFEROL) tablet 25 mcg  25 mcg Oral or Feeding Tube Daily Bibi Cancino MD   25 mcg at 03/31/25 0843       HABITS:    Social History     Tobacco Use    Smoking status: Former     Current packs/day: 1.00     Average packs/day: 1 pack/day for 46.2 years (46.2 ttl pk-yrs)     Types: Cigarettes     Start date: 1/1/1979     Passive exposure: Current    Smokeless tobacco: Never    Tobacco comments:     Declined QP 05/13/2020   Vaping Use    Vaping status: Never Used   Substance Use Topics    Alcohol use: No     Alcohol/week: 0.0 standard drinks of alcohol     "Drug use: No       Family History   Problem Relation Age of Onset    Diabetes Mother     Diabetes Father     Hypertension Brother     Diabetes Sister        REVIEW OF SYSTEMS:  Ten point review of systems negative except those mentioned in the HPI.     OBJECTIVE:    /52   Pulse 73   Temp 98.1  F (36.7  C) (Tympanic)   Resp 23   Ht 1.651 m (5' 5\")   Wt 109.4 kg (241 lb 2.9 oz)   SpO2 92%   BMI 40.13 kg/m      GENERAL: Intubated and sedated   HEENT:   Sclerae anicteric - normocephalic atraumatic   Abdomen, There is induration in the right groin, there is open area draining puss does track up the right groin    IMPRESSION:    Draining abscess in the right groin, concern it tracks up the right groin by digital exam complicating there is a hernia in this area as well. She is critically ill on vent as well as heparin gtt for progressive PE. Would like to get a CT just focsing on her pelvis, if extension up the groin is not to far will plan to try and get control at bedside, if appears more extensive will likely need operative source control. Source is possible per-anal fistula vs diabetic polymicrobial abscess.     PLAN:    CT pelvis  Bedside vs operative control of abscess,     Gerard Oropeza MD,     3/31/2025  11:58 AM      "

## 2025-03-31 NOTE — PROGRESS NOTES
[facility]  Hospitalist Progress Note          Assessment and Plan:     Hospitalist Progress Note       59 years old female with a past medical history of hypertension, diabetes mellitus type 2, pulmonary emphysema, intellectual disability with memory issues, hyperparathyroidism and multiple other medical issues was brought to the emergency room for high blood sugars with shortness of breath. Complains of pain generalized symptoms in the chest but difficult to clarify. Not a good historian. Most of the history I also has been obtained from chart/caregivers. In the ED was noted to be tachycardic in the 130s and a subsequent EKG was concerning for SVT. Repeat EKG had shown A-fib with rapid ventricular response. COVID influenza and RSV is negative. CBC showed a white count of 16.4 with a hemoglobin of 16.8 and a platelet count of 260. CMP showed sodium 134 potassium 4.5 BUN of 48.1 with a creatinine of 1.32. AST/ALT was 21/10. VBG showed a pH of 1.39 with a pCO2 of 38 and a bicarb of 23. Lactic acid is 1.8. Ketones was 1.89. Follow-up CT chest shows a small pulmonary artery embolism to the subsegmental branches of the left lower lobe with no right heart strain. Patient was initiated on therapeutic Lovenox in addition to insulin drip for possible diabetic ketoacidosis. Cardizem infusion was initiated for SVT/A-fib. She was admitted for further evaluation.         Assessment & Plan  Marly Cannon is a 59 year old female who was admitted on 3/22/2025.      Acute hypoxic/hypercapnic respiratory failure: Patient intubated afternoon of 3/23.  Etiology uncertain, however possibility includes PE, aspiration, pneumonia, versus bad COPD.  CTA chest on admission showed clear lungs, small subsegmental PE.  Placed on therapeutic Lovenox at that time.  Patient is subsequently required BiPAP, then intubation over the course of 8 to 10 hours.  Serial chest x-rays have been stable/clear.  Worst ABG showed pH of 7.05 with  pCO2 103, suggesting retention.COPD exacerbation?  -3/24: Intubated, sedated on 3/23.  Ventilator settings with FiO2 of 70%, PEEP of 5.  Latest ABG pH 7.34, pCO2 41, pO2 of 85..  Repeat CTA chest shows progression of right lower lobe pulmonary embolism, now involving the segmental as well as a subsegmental arteries.  There is collapse of the entire right lower lobe with opacification of bronchi within the right lower lobe, possible mucous plugging from aspiration of contents?  Continuing Zosyn  -3/25: Remains intubated, sedated.  Ventilator settings with FiO2 at 60%, PEEP of 5.  ABG with pH of 7.33.  Chest x-ray stable, persistent collapsed middle lobe.  On Zosyn, possible aspiration versus mucous plug.  Chest physiotherapy ordered by telemetry ICU.  -3/26: Patient remains on the ventilator.  ABG with pH 7.37 pCO2 34 pO2 79 bicarb 20.  Sats been running 96+ percent.  She is on 80% FiO2.  Repeat chest x-ray no significant changes.  Still has what appears to be right lower lobe collapse.  Clear lungs on exam.  He is getting significant amount of IV fluid over the last 24 hours of most 5 L.  No significant wheezing.  Will try and titrate down on the FiO2.  Somewhat surprising requires so much oxygen at this point.  His lung fields specially left lung is relatively clear.  No significant secretions.  Sedated with the Versed and fentanyl.  Continue with aggressive pulmonary toilet.  Wean FiO2.  Is on antibiotics for potential aspiration.  Getting IV steroids every 6 hours will cut back on the dosing of this also.  -3/27: Patient remains intubated.  Her oxygen requirements have been decreased down to 55% FiO2.  Arterial blood gas has a pH of 7.39 pCO2 34 pO2 56.  Chest x-ray still shows to the right lower lobe probable collapse.  Some mild pleural effusions bilaterally.  He is only on fentanyl currently for sedation.  Treating for pneumonia and her pulmonary embolism.  Chronically is on 2 L of O2.  -3/28: Reviewed  patient's chart.  As an outpatient she does have a significant history of fairly severe COPD.  She has had variable PFTs performed which all have showed obstructive lung disease but variable DLCO was due to poor patient cooperation with the studies.  There was also concern of a fixed obstruction however evaluation by ENT and CT scans were not able to document any of this finding.  She also does have severe obstructive sleep apnea with severe sleep associated hypoxemia they recommended BiPAP however patient was unable to tolerate this and refused any further intervention or trials.  Currently patient remains intubated.  ABG still looks good pH 7.37 pCO2 35 pO2 65.  Currently is on assist-control rate of 20 tidal volume 400 PEEP of 8 she is still on 55% FiO2.  Sats been fine greater than 90%.  She was on CPAP for well over 3 to 4 hours yesterday and tolerated it without issue.  Chest x-ray shows probably pleural effusions.  Minimal secretions.  Still have her on bronchodilators some steroids.  Minimal sedation.  Have some concern regarding this.  Will evaluate neurostatus further here today.  Pulmonary embolism be treated with heparin.  Does have fairly severe underlying COPD.  Also fairly severe sleep apnea.  She is very alert and appropriate this morning.  Down to 50% FiO2.  Still on PEEP of 8.  Will try to put her on CPAP again here this morning for a while and see how she does.  Not quite sure she is ready for attempted extubation.  Will see how she does and discuss further with tele-ICU.  -3/29: Remains intubated ABG looks good is able to tolerate CPAP without difficulty.  Still is on 50% FiO2.  Will contact telemetry ICU regarding their thoughts regarding an attempt at extubation.  She was very alert actually breathing over her current vent settings she was placed on CPAP by respiratory therapy.  At this point considering extubation at some point this morning will just run up by tele ICU.  -3/30: This is day 8  of intubation.  Yesterday had an increase in her FiO2 requirements without a whole lot of change in her other vital signs.  She is on SIMV rate of 20 with FiO2 now down to 65%.  5 PEEP pressures remain good.  Did put her on some propofol just for comfort.  Minimal amounts of secretions.  Is on Zosyn.  Does have known pulmonary emboli.  Bilateral pleural effusions.  Has had collapse of that right middle lower lobe noted on CT scan previously also.  She really is in no distress at all.  Why she had the increase in her FiO2 yesterday is a little unclear.  We are in the process of diuresing her.  Certainly would like to strongly consider extubation soon.  She tolerates CPAP at least yesterday without a great deal of issues.  Sputum sent is growing out just Candida is on some fluconazole.  Will continue with current support with hopefully extubation soon.  We are able to get her oxygen requirements down and she is down to 55% now.  Titrate down as feasible.  In no real distress at all at this time.        Suspected pneumonia: initial CTA chest shows clear lungs, however patient tachypneic, increased work of breathing.  Oxygen requirement likely due to COPD.  Repeat CTA findings as above.  -Continuing Zosyn  -Continue to monitor respiratory status on ventilator  -3/26: Currently is on Zosyn/vancomycin.  MRSA/SA swab negative.  White count is 22,000.  No major fevers.  Blood cultures remain negative.  Minimal secretions.  -3/28: Have discontinued vancomycin.  Patient remains on Zosyn.  Blood cultures negative.  A sputum sample was obtained couple days ago does show 3+ yeast and 2+ gram-positive cocci in pairs culture is in process.  Remains afebrile.  White count still elevated 19,000.  -3/29: Patient patric on Zosyn.  Yeast growing out of her sputum she is on fluconazole.  X-ray shows what appears to be increasing pleural effusions in all likelihood.  She did have the right middle/lower lobe collapse.  -3/30: Patient's  been afebrile.  Remains on IV Zosyn.  Only growing out some Candida from her sputum.  White count mild elevation 16,000.  -3/31: White count down to 13.2.  Remains on Zosyn.     Shock: Uncertain etiology.  Will need to rule out obstructive shock with CTA.  Patient is now on moderate amount of Levophed.  She has been adequately volume resuscitated per sepsis protocol.  -3/24: Maintenance fluids.  Art line inserted, will titrate pressors to MAP of 60.  Echocardiogram ordered.  WBC 15.5, Tmax 99.7 in last 24 hours.  Urinalysis negative for significant pyuria.  Initial CTA on admission showed clear lungs.  CTA without massive saddle embolus, CT abdomen pelvis with dilated gallbladder, but no biliary dilatation, no obvious obstruction.  No thickened gallbladder wall, no fluid collection.  CT also shows subcutaneous edema in the lower anterior abdominal wall and into the right labia.  Possible cellulitic infection?  Will continue IV Zosyn.  Adding vancomycin  -3/25: Patient with persistent vasopressor requirement, 0.08 Levophed.  Turned up periodically to 0.11.  Possible source of infections include aspiration pneumonia, cellulitis of lower abdominal wall/labia, gallbladder.  LFTs have remained unremarkable.  Continuing Zosyn/vancomycin  -3/26: Blood pressures have been greater than 100 systolic.  He is on 0.05 of Levophed.  Will continue to titrate this down his tolerated.  Has received significant IV fluids at this point.  Urine output has been on the somewhat oliguric side.  Echocardiogram was a very technically difficult study the LV function was normal however.  Right ventricle did not appear to be grossly dilated.  -3/27: Patient had what appeared to be started a supraventricular rhythm throughout yesterday.  She reverted back to sinus rhythm around 8 PM at that time her pressures did come up significantly.  She is no longer requiring any pressors.  -3/28: Patient patric off pressors.  Blood pressures been greater  than 100 systolic.  Heart rates been in the 70s in sinus rhythm.  She is very sensitive to her atrial dysrhythmias.  Once she converted back to sinus rhythm she immediately had significant improvement in her blood pressures.  Echocardiogram has shown normal systolic function.  She is off the amiodarone.  Volume wise she probably is volume up in terms of the amount of IV fluid she is received.  Weights have shown significant weight gain over her stay she was 90.4 kg 107.8 yesterday.  -3/29 resolved     Encephalopathy: Toxic versus metabolic.  Patient is intubated and sedated.  -3/24: CT of the head negative for acute pathology.  -3/26: Patient sedated at this point difficult to really assess her mental status.  -3/28: Patient's morning does open her eyes to voice.  Follows commands appropriately.  Nods yes and no.  Squeezes hands moves toes.  She is on no sedation at this time.  -3/29: Alert following commands.  -3/30: Yesterday she continued to be very alert appropriate following commands without trouble.  After we had to increase her FiO2 I did have them put her back on some propofol for comfort.  Now this morning she is awake alert following commands appropriate.  -3/31: Patient was quite alert and responsive on exam today.  However her respiratory rates were in the low 30s hence sedation was reinstituted with propofol.  Overnight she was given some Dilaudid per orders from telemetry ICU.     Pulmonary embolism: Subsegmental with no right heart strain at least on CT.  Patient given therapeutic Lovenox in the emergency room.  -Echocardiogram ordered  -3/24: Currently on Lovenox twice daily.  Given worsening renal function, will switch her to heparin  -3/25: Continuing on heparin gtt  -3/26: PE on heparin drip.  -3/27: Continues on the heparin infusion.  -3/28: At some point we will switch over to oral anticoagulant  -3/29: IV heparin  -3/30: Is on heparin.  -3/31: remains on heparin.     CVS:  Patient is initial  EKG with A-fib RVR with heart rate in the 150s.  Patient received diltiazem, now she is converted.  She was on p.o. diltiazem 60 mg every 8 hours scheduled, as well as a diltiazem drip.  This was stopped due to hypotension.  Patient has also had self-limited runs of SVT.  -3/24: Shock requiring pressors.  Patient currently normal sinus rhythm but rate is tachy at 120.  Echocardiogram is ordered.  Given hypotension, cannot use calcium channel blocker or beta-blocker.  EKG shows SVT versus aflutter.  Will start her on amiodarone drip.  Continuing anticoagulation with heparin gtt  -3/25: Patient remains on Levophed, amiodarone.  Heart rate better controlled in the 1 teens to 120s, but still appears to be SVT/aflutter.  Continues on heparin drip as well.  Source of shock uncertain.  Echocardiogram official read with ejection fraction of 65 to 70%, hyperdynamic LV, no significant valvular or structural abnormalities.  -3/26: Heart rate rate around 115.  Is regular.  Cannot really tell on telemetry if this is not a flutter or sinus tachycardia will obtain twelve-lead EKG.  He is on amiodarone as apparently did have SVT and A-fib earlier.  -3/27: Patient did revert to sinus rhythm last night heart rates have been now in the 60-70 range.  Amiodarone was discontinued.  -3/28: As above she does not seem to tolerate any type of atrial dysrhythmia.  Does get rather hypotensive.  She is been in sinus rhythm with blood pressures doing very well anywhere from 105-100 30s systolic range.  Art line is in place.  Likely will be able to discontinue this in the next 24 hours.  Site still looks okay.  -3/29: Patient been off pressors for couple days.  Blood pressure has been right around 100-105 systolic range heart rate this morning is gone up a little bit to 100.  Remains sinus rhythm.  -3/30: Remains hemodynamically stable.  Sinus rhythm.  Did have a brief episode yesterday of perhaps supraventricular rhythm once again however was  short-lived.  Definitely volume up.  Did get a dose of Bumex yesterday 34 cc out.  Will continue with this today  -3/31: Stable at this time.  Rates in the 70s and 80s.     Uncontrolled diabetes mellitus type 2, insulin-dependent: This is despite insulin pump.  Patient had functioning insulin pump on 3/20/2025 when she was discharged from this facility.  Anion gap is 17, but blood glucoses have been in the 200s.  Serum ketones resolved for a time, but now have returned.  Ketones uptrending  -3/23: Insulin pump removed.  Was given long-acting insulin 20 units of Toujeo this a.m., ketones continuing to uptrend.  Will place her back on insulin drip at this time.  -3/24: Patient on insulin drip.  Shutting off periodically for euglycemia.  -3/25: Patient intermittently on insulin drip.  Bicarb 17, anion gap closed at 15.  Holding subcu insulin in favor of insulin drip at this time  -3/26: Is on the insulin infusion at this point.  Sugars are appropriate.  He is getting tube feedings at goal rate.  -3/27: Remains on insulin infusion sugars have been fine 120 range.  -3/28: May consider subcu transition in the next day or so also.  She remains on tube feedings and tolerating them well.  -3/29: Patient is on insulin infusion.  Blood sugars have been 1 30-1 50 range insulin titrated.  He is getting the tube feedings.  -3/30: Remains on insulin drip.  -3/31: Insulin drip.     YADIRA: Creatinine rising since admission, now 1.64.  Previously slightly above 1, on presentation was 1.3.  Patient did receive contrast.  -3/24: Creatinine 1.91.  Maintaining IV fluids.  Monitoring renal function and urine output  -3/25: Creatinine climbing to 2.06 despite IV fluids.  Patient did receive IV contrast, possible contrast nephropathy.  Urine output has been adequate.  -3/26: Somewhat oliguric.  Urine output only 1 L over the last 24 hours.  A total of 5163 cc in.  BUN/creatinine are slightly improved to 55/1.91.  Potassium 3.8 sodium is 144.   Minimal lower extremity edema.  At some point may require at least some Lasix.  -3/27: Still somewhat oliguric.  Creatinine has jumped up somewhat today to 2.09 BUN 60.5 sodium 148.  Does need increasing free water.  Will administer this via her gastric tube.  Also change to half-normal saline.  Landrum catheter is in place.  Repeat UA urine sodium and creatinine.  -3/28: Urine output is picking up a bit.  Was started on metolazone recommended by tele ICU her creatinine is no longer rising.  Is down to 1.95.  Potassium is 3.5.  Magnesium was 2.0.  Serum sodium is also improved.  Have been giving increased free water via her oral gastric tube.  -3/29: Urine output has picked up.  With 800 cc out yesterday Lnadrum catheter is in place.  Total intake was 1700.  Creatinine still remains right around 2.  BUN in the 60 range.  The potassium is 3.9 sodium is 139.  Is not getting any current IV fluids.  -3/30: Urine output has picked up significantly with diuresis.  I did increase her dosing to twice daily.  She is getting free water via her G-tube will cut back on the amount.  Her serum sodium has been good.  Creatinine still is in the 2 range.  Potassium 4.2.  Magnesium was 1.9  -3/31: Creatinine stable at 1.96.  Continue diuresis.     COPD: Oxygen dependence established last admission.  Patient was discharged on 2 L nasal cannula after last admission..  Also has a history of severe obstructive sleep apnea with severe nocturnal hypoxia.  Declined BiPAP which is recommended by the sleep study group.     FEN: Patient is on tube feedings rate of 60 cc an hour tolerating them well..        Diet: NPO for Medical/Clinical Reasons Except for: No Exceptions  Adult Formula Drip Feeding: Specified Time Osmolite 1.5; Orogastric tube; Goal Rate: 60; mL/hr; Keep at 10ml/hr for 24 hours. After 24 hours advance by 5ml/hr Q8H.  Fluids: None     DVT Prophylaxis: IV heparin  Code Status: Full Code     Disposition: Expected discharge no later  "than 3 to 5 day    Clinically Significant Risk Factors          # Hyperchloremia: Highest Cl = 109 mmol/L in last 2 days, will monitor as appropriate          # Hypoalbuminemia: Lowest albumin = 1.7 g/dL at 3/30/2025  4:45 AM, will monitor as appropriate     # Hypertension: Noted on problem list     # Acute Hypoxic Respiratory Failure: Documented O2 saturation < 90%. Continue supplemental oxygen as needed  # Acute Hypercapnic Respiratory Failure: based on arterial blood gas results.  Continue supplemental oxygen and ventilatory support as indicated.        # DMII: A1C = 12.7 % (Ref range: <5.7 %) within past 6 months   # Severe Obesity: Estimated body mass index is 40.13 kg/m  as calculated from the following:    Height as of this encounter: 1.651 m (5' 5\").    Weight as of this encounter: 109.4 kg (241 lb 2.9 oz).      # COPD: noted on problem list              Interval History:     Patient remains intubated and lightly sedated.  Overnight Dilaudid was added for pain control.  She does remain on propofol.  On CPAP this morning respiratory rate in the low 30s.              Medications:     Current Facility-Administered Medications   Medication Dose Route Frequency Provider Last Rate Last Admin    [Held by provider] aspirin (ASA) chewable tablet 81 mg  81 mg Oral Daily Te Parish MD   81 mg at 03/24/25 0948    [Held by provider] atorvastatin (LIPITOR) tablet 40 mg  40 mg Oral At Bedtime Te Parish MD   40 mg at 03/23/25 2301    bumetanide (BUMEX) injection 0.5 mg  0.5 mg Intravenous Q12H Prosper Schulz MD   0.5 mg at 03/31/25 0825    chlorhexidine (PERIDEX) 0.12 % solution 15 mL  15 mL Mouth/Throat Q12H Gentry Saez DO   15 mL at 03/31/25 0856    [Held by provider] empagliflozin (JARDIANCE) tablet 25 mg  25 mg Oral Daily eT Parish MD   25 mg at 03/24/25 0948    fluconazole (DIFLUCAN) intermittent infusion 200 mg  200 mg Intravenous Q24H Bibi Cancino  mL/hr " at 25 185 200 mg at 25 185    [Held by provider] insulin glargine U-300 (TOUJEO) injection 20 Units  20 Units Subcutaneous Te Boothe MD   20 Units at 25 0911    ipratropium - albuterol 0.5 mg/2.5 mg/3 mL (DUONEB) neb solution 3 mL  3 mL Nebulization 4x daily Delmy Harris MD   3 mL at 25 0721    metolazone (ZAROXOLYN) tablet 5 mg  5 mg Per OG Tube Daily Prosper Schulz MD   5 mg at 25 0843    pantoprazole (PROTONIX) 2 mg/mL suspension 40 mg  40 [facility] Gentry Saez P, DO        Or    pantoprazole (PROTONIX) IV push injection 40 mg  40 mg Intravenous QAGentry Paul DO   40 mg at 25 0824    piperacillin-tazobactam (ZOSYN) 3.375 g vial to attach to  mL bag  3.375 g Intravenous Q6H Bibi Cancino MD   3.375 g at 25 0822    primidone (MYSOLINE) tablet 50 mg  50 mg Oral or Feeding Tube At Bedtime Bibi Cancino MD   50 mg at 25 2237    Vitamin D3 (CHOLECALCIFEROL) tablet 25 mcg  25 mcg Oral or Feeding Tube Daily Bibi Cancino MD   25 mcg at 25 0843                  Physical Exam:     Vitals:    25 0600 25 0630 25 0721 25 0824   BP: 116/62      BP Location:       Pulse: 77 77     Resp: 28 26     Temp:    98.1  F (36.7  C)   TempSrc:    Tympanic   SpO2: 93% 94% 93%    Weight: 109.4 kg (241 lb 2.9 oz)      Height:             Vital Sign Ranges  Temperature Temp  Av.8  F (36.6  C)  Min: 97.2  F (36.2  C)  Max: 98.3  F (36.8  C)   Blood pressure Systolic (24hrs), Av , Min:99 , Max:124        Diastolic (24hrs), Av, Min:47, Max:76      Pulse Pulse  Av.6  Min: 76  Max: 86   Respirations Resp  Av.6  Min: 16  Max: 31   Pulse oximetry SpO2  Av.5 %  Min: 88 %  Max: 94 %         Intake/Output Summary (Last 24 hours) at 3/31/2025 0944  Last data filed at 3/31/2025 0822  Gross per 24 hour   Intake 2775.8 ml   Output 6780 ml   Net -4004.2 ml        General: intubated and lightly sedated.  Heart:  RRR, S1 S2, No murmurs, no rubs, no gallops.  Resp: CTA bilaterally.  Upper field rhonchi.  Abd: Soft/NT/ND/Positve BS.  Ext: +2 lower extremity edema bilaterally.  Neuro:  No focal Neurologic deficits noted.  Right IJ central line.    Arterial Line 03/23/25 Radial (Active)   Site Assessment WDL 03/31/25 0600   Line Status Pulsatile blood flow 03/31/25 0600   Art Line Waveform Appropriate 03/31/25 0600   Art Line Interventions Connections checked and tightened 03/31/25 0600   Color/Movement/Sensation Capillary refill less than 3 sec 03/31/25 0600   Line Necessity Yes, meets criteria 03/31/25 0600   Dressing Type Transparent 03/31/25 0600   Dressing Status Clean, dry, intact 03/31/25 0600   Dressing Intervention Dressing changed/new dressing 03/27/25 2200   Dressing Change Due 03/31/25 03/29/25 1630   Number of days: 8       CVC Triple Lumen Right Internal jugular (Active)   Site Assessment WDL 03/31/25 0600   Dressing Chlorhexidine disk;Transparent 03/31/25 0600   Dressing Status clean;dry;intact 03/30/25 2005   Dressing Intervention dressing changed 03/30/25 1145   Line Necessity Yes, meets criteria 03/31/25 0600   Blue - Status infusing 03/31/25 0600   Blue - Intervention Flushed 03/30/25 0800   Brown - Status blood return noted;saline locked 03/31/25 0600   Brown - Intervention Flushed 03/30/25 2005   White - Status infusing 03/31/25 0600   White - Intervention Flushed 03/30/25 0800   Phlebitis Scale 0-->no symptoms 03/31/25 0600   Infiltration? no 03/31/25 0600   Number of days: 8       ETT Cuffed 7.5 mm (Active)   Secured at (cm) 21 cm 03/31/25 0721   Measured from Teeth/Gums 03/31/25 0721   Position Left 03/31/25 0721   Secured by Commercial tube haque 03/31/25 0721   Bite Block Included with Commercial tube haque 03/31/25 0721   Site Appearance Clean;Dry;Edema 03/31/25 0500   Tube Care Site care done 03/31/25 0022   Cuff Assessment Cuff Pressure  03/31/25 0721   Cuff Pressure - cm H2O 26 cmH20 03/31/25 0721   Safety Measures Manual resuscitator at bedside 03/31/25 0721   Number of days: 8       GI Enteral Access Device Mouth, center Gastric 18 fr (Active)   Status Feeding 03/31/25 0400   Placement Confirmation Neche unchanged;Tube secure 03/31/25 0400   Surrounding Skin Assessment WDL 03/31/25 0400   Insertion Site Assessment WDL 03/31/25 0000   Neche (cm marking) at nare/mouth 57 cm 03/31/25 0400   Neche (visual) Neche at entry site (nare, mouth, etc.) 03/31/25 0400   Securement Device Tape 03/31/25 0400   Drainage [place];Yellow 03/31/25 0400   Intake (mL) 30 ml 03/30/25 0800   Flush/Free Water (mL) 100 mL 03/31/25 0400   Residual (mL) 285 mL 03/31/25 0822   Output (mL) 500 ml 03/25/25 0600   Number of days: 8       Urinary Drain 03/23/25 Urethral Catheter (Active)   Tube Description Positional 03/31/25 0400   Catheter Care Catheter wipes 03/31/25 0400   Perineal Skin Assessment Edema;Erythema 03/30/25 2005   Collection Container Standard 03/31/25 0400   Securement Method Commercial securement device 03/31/25 0400   Rationale for Continued Use ICU only: hourly urine output needed for patient care 03/31/25 0400   Urine Output 450 mL 03/31/25 0822   Number of days: 8     Right IJ central line.  Arterial line.  Landrum catheter.             Data:   All laboratory and imaging data in the past 24 hours reviewed  Lactic Acid   Date Value Ref Range Status   03/24/2025 1.5 0.7 - 2.0 mmol/L Final   03/24/2025 2.2 (H) 0.7 - 2.0 mmol/L Final   03/24/2025 2.6 (H) 0.7 - 2.0 mmol/L Final       Gentry Saez, DO

## 2025-03-31 NOTE — PROGRESS NOTES
"CLINICAL NUTRITION SERVICES  -  REASSESSMENT NOTE    REASON FOR ASSESSMENT:  follow up    Recommendations / Nutrition Prescription  Decrease goal rate with increase in propofol use  --Osmolite 1.5: Continuous feeds with a goal rate of 30ml/hr (720ml/day). Plus 2 packets of Prosource TF daily.  --Water Flushes: 100ml Q8H to meet fluid needs. Consider increasing to 250ml Q8H to meet current free water intake (decreased free water intake with decreased volume of formula).   --This provides 1836kcal, 85g protein, 848ml free water, 0g fiber.   --Monitor electrolytes, replace as indicated. Monitor bowel function  --Future considerations, may need to decrease rate of enteral feeds due to calories from propofol     NUTRITION HISTORY  Marly Cannon is a 59 year old female admitted for acute hypoxic/hypercapnic respritaroty failure, suspected pneumonia, shock. PMH includes A-fib with RVR,insulin dependent type 2 diabetes, COPD. Pt remains intubated. Tube feeds at goal rate of 60ml/hr. Tolerating well. Having bowel movements. Propofol started over the weekend, rate increased to 22.6ml/hr which provides 596kcal per day. Noted free water flushes decreased to 100ml Q8H    Allergies   No Known Allergies    CURRENT NUTRITION ORDERS  Diet Order:   Orders Placed This Encounter      NPO for Medical/Clinical Reasons Except for: No Exceptions, Osmolite 1.5 at a rate of 60ml/hr   Current Intake/Tolerance: Osmolite 1.5 60ml/hr    ANTHROPOMETRICS  Height: 5' 5\"  Weight: 241 lbs 2.93 oz  Body mass index is 40.13 kg/m .  Weight Status:  Obesity Grade III BMI >40  Weight History: weight is up   Wt Readings from Last 10 Encounters:   03/31/25 109.4 kg (241 lb 2.9 oz)   03/22/25 90.4 kg (199 lb 4.7 oz) admit weight   03/18/25 88.1 kg (194 lb 3.6 oz)   03/17/25 86 kg (189 lb 11.2 oz)   01/23/25 92.5 kg (204 lb)   12/05/24 92.9 kg (204 lb 12.5 oz)   11/25/24 91.4 kg (201 lb 8 oz)   10/30/24 91.4 kg (201 lb 9.6 oz)   08/01/24 90.4 kg (199 lb 4.7 " oz)   07/18/24 90.4 kg (199 lb 4.8 oz)   05/16/24 88.1 kg (194 lb 4.8 oz)        LABS  Labs reviewed    MEDICATIONS  Medications reviewed    ASSESSED NUTRITION NEEDS:  Estimated Energy Needs: 1710 kcals (PSU modified) 90.4kg admit weight  Estimated Protein Needs:  grams protein (1.2-1.5 g pro/Kg) 67.9kg max ibw  Estimated Fluid Needs: 2312 mL (1 mL/Kcal)     Malnutrition Diagnosis: Patient does not meet two of the criteria necessary for diagnosing malnutrition     MONITORING AND EVALUATION:  Enteral nutrition, tolerance, intake, weight, labs

## 2025-04-01 LAB
% LINING CELLS, BODY FLUID: 59 %
ANION GAP SERPL CALCULATED.3IONS-SCNC: 10 MMOL/L (ref 7–15)
APPEARANCE FLD: ABNORMAL
BACTERIA BRONCH: NORMAL
BACTERIA SPEC CULT: NORMAL
BASE EXCESS BLDA CALC-SCNC: 2.3 MMOL/L (ref -3–3)
BUN SERPL-MCNC: 45.8 MG/DL (ref 8–23)
CALCIUM SERPL-MCNC: 8.3 MG/DL (ref 8.8–10.4)
CHLORIDE SERPL-SCNC: 103 MMOL/L (ref 98–107)
COLOR FLD: COLORLESS
CREAT SERPL-MCNC: 1.73 MG/DL (ref 0.51–0.95)
CREAT UR-MCNC: 33.6 MG/DL
EGFRCR SERPLBLD CKD-EPI 2021: 33 ML/MIN/1.73M2
ERYTHROCYTE [DISTWIDTH] IN BLOOD BY AUTOMATED COUNT: 14.3 % (ref 10–15)
FRACT EXCRET NA UR+SERPL-RTO: 3.4 %
GLUCOSE BLDC GLUCOMTR-MCNC: 100 MG/DL (ref 70–99)
GLUCOSE BLDC GLUCOMTR-MCNC: 113 MG/DL (ref 70–99)
GLUCOSE BLDC GLUCOMTR-MCNC: 114 MG/DL (ref 70–99)
GLUCOSE BLDC GLUCOMTR-MCNC: 120 MG/DL (ref 70–99)
GLUCOSE BLDC GLUCOMTR-MCNC: 124 MG/DL (ref 70–99)
GLUCOSE BLDC GLUCOMTR-MCNC: 125 MG/DL (ref 70–99)
GLUCOSE BLDC GLUCOMTR-MCNC: 139 MG/DL (ref 70–99)
GLUCOSE BLDC GLUCOMTR-MCNC: 142 MG/DL (ref 70–99)
GLUCOSE BLDC GLUCOMTR-MCNC: 142 MG/DL (ref 70–99)
GLUCOSE BLDC GLUCOMTR-MCNC: 151 MG/DL (ref 70–99)
GLUCOSE BLDC GLUCOMTR-MCNC: 154 MG/DL (ref 70–99)
GLUCOSE BLDC GLUCOMTR-MCNC: 164 MG/DL (ref 70–99)
GLUCOSE BLDC GLUCOMTR-MCNC: 168 MG/DL (ref 70–99)
GLUCOSE BLDC GLUCOMTR-MCNC: 177 MG/DL (ref 70–99)
GLUCOSE SERPL-MCNC: 173 MG/DL (ref 70–99)
GRAM STAIN RESULT: NORMAL
GRAM STAIN RESULT: NORMAL
HCO3 BLD-SCNC: 27 MMOL/L (ref 21–28)
HCO3 SERPL-SCNC: 26 MMOL/L (ref 22–29)
HCT VFR BLD AUTO: 36.8 % (ref 35–47)
HGB BLD-MCNC: 12.2 G/DL (ref 11.7–15.7)
KOH PREPARATION: NORMAL
KOH PREPARATION: NORMAL
L PNEUMO1 AG UR QL IA: NEGATIVE
LYMPHOCYTES NFR FLD MANUAL: 0 %
MAGNESIUM SERPL-MCNC: 2.3 MG/DL (ref 1.7–2.3)
MCH RBC QN AUTO: 30 PG (ref 26.5–33)
MCHC RBC AUTO-ENTMCNC: 33.2 G/DL (ref 31.5–36.5)
MCV RBC AUTO: 90 FL (ref 78–100)
MONOS+MACROS NFR FLD MANUAL: 30 %
NEUTS BAND NFR FLD MANUAL: 11 %
O2/TOTAL GAS SETTING VFR VENT: 50 %
OXYHGB MFR BLDA: 92 % (ref 92–100)
PATH REV: NORMAL
PCO2 BLD: 39 MM HG (ref 35–45)
PEEP: 10 CM H2O
PH BLD: 7.44 [PH] (ref 7.35–7.45)
PHOSPHATE SERPL-MCNC: 4.9 MG/DL (ref 2.5–4.5)
PLATELET # BLD AUTO: 286 10E3/UL (ref 150–450)
PO2 BLD: 63 MM HG (ref 80–105)
POTASSIUM SERPL-SCNC: 4.3 MMOL/L (ref 3.4–5.3)
RBC # BLD AUTO: 4.07 10E6/UL (ref 3.8–5.2)
S PNEUM AG SPEC QL: NEGATIVE
SAO2 % BLDA: 92.8 % (ref 96–97)
SODIUM SERPL-SCNC: 139 MMOL/L (ref 135–145)
SODIUM UR-SCNC: 86 MMOL/L
SPECIMEN SOURCE FLD: ABNORMAL
SPECIMEN TYPE: NORMAL
UFH PPP CHRO-ACNC: 0.51 IU/ML
UUN UR-MCNC: 329 MG/DL (ref 801–1666)
WBC # BLD AUTO: 11.8 10E3/UL (ref 4–11)
WBC # FLD AUTO: 49 /UL

## 2025-04-01 PROCEDURE — 83735 ASSAY OF MAGNESIUM: CPT | Performed by: INTERNAL MEDICINE

## 2025-04-01 PROCEDURE — 94799 UNLISTED PULMONARY SVC/PX: CPT

## 2025-04-01 PROCEDURE — 94003 VENT MGMT INPAT SUBQ DAY: CPT

## 2025-04-01 PROCEDURE — 85014 HEMATOCRIT: CPT | Performed by: INTERNAL MEDICINE

## 2025-04-01 PROCEDURE — 250N000011 HC RX IP 250 OP 636: Mod: JZ | Performed by: INTERNAL MEDICINE

## 2025-04-01 PROCEDURE — 999N000185 HC STATISTIC TRANSPORT TIME EA 15 MIN

## 2025-04-01 PROCEDURE — 80051 ELECTROLYTE PANEL: CPT | Performed by: INTERNAL MEDICINE

## 2025-04-01 PROCEDURE — 999N000157 HC STATISTIC RCP TIME EA 10 MIN

## 2025-04-01 PROCEDURE — 82565 ASSAY OF CREATININE: CPT | Performed by: INTERNAL MEDICINE

## 2025-04-01 PROCEDURE — 94640 AIRWAY INHALATION TREATMENT: CPT | Mod: 76

## 2025-04-01 PROCEDURE — 200N000001 HC R&B ICU

## 2025-04-01 PROCEDURE — 82805 BLOOD GASES W/O2 SATURATION: CPT | Performed by: INTERNAL MEDICINE

## 2025-04-01 PROCEDURE — 87040 BLOOD CULTURE FOR BACTERIA: CPT | Performed by: INTERNAL MEDICINE

## 2025-04-01 PROCEDURE — 999N000253 HC STATISTIC WEANING TRIALS

## 2025-04-01 PROCEDURE — G0463 HOSPITAL OUTPT CLINIC VISIT: HCPCS

## 2025-04-01 PROCEDURE — 250N000009 HC RX 250: Performed by: INTERNAL MEDICINE

## 2025-04-01 PROCEDURE — 80048 BASIC METABOLIC PNL TOTAL CA: CPT | Performed by: INTERNAL MEDICINE

## 2025-04-01 PROCEDURE — 999N000259 HC STATISTIC EXTUBATION

## 2025-04-01 PROCEDURE — 272N000202 HC AEROBIKA WITH MANOMETER

## 2025-04-01 PROCEDURE — 999N000287 HC ICU ADULT ROUNDING, EACH 10 MINS

## 2025-04-01 PROCEDURE — 99222 1ST HOSP IP/OBS MODERATE 55: CPT | Performed by: PHYSICIAN ASSISTANT

## 2025-04-01 PROCEDURE — 999N000009 HC STATISTIC AIRWAY CARE

## 2025-04-01 PROCEDURE — 3E043XZ INTRODUCTION OF VASOPRESSOR INTO CENTRAL VEIN, PERCUTANEOUS APPROACH: ICD-10-PCS | Performed by: INTERNAL MEDICINE

## 2025-04-01 PROCEDURE — 250N000009 HC RX 250: Performed by: STUDENT IN AN ORGANIZED HEALTH CARE EDUCATION/TRAINING PROGRAM

## 2025-04-01 PROCEDURE — 99291 CRITICAL CARE FIRST HOUR: CPT | Performed by: STUDENT IN AN ORGANIZED HEALTH CARE EDUCATION/TRAINING PROGRAM

## 2025-04-01 PROCEDURE — 84100 ASSAY OF PHOSPHORUS: CPT | Performed by: INTERNAL MEDICINE

## 2025-04-01 PROCEDURE — 85520 HEPARIN ASSAY: CPT | Performed by: INTERNAL MEDICINE

## 2025-04-01 PROCEDURE — 250N000013 HC RX MED GY IP 250 OP 250 PS 637: Performed by: INTERNAL MEDICINE

## 2025-04-01 RX ORDER — ALBUTEROL SULFATE 0.83 MG/ML
2.5 SOLUTION RESPIRATORY (INHALATION)
Status: DISCONTINUED | OUTPATIENT
Start: 2025-04-01 | End: 2025-04-01

## 2025-04-01 RX ORDER — SODIUM CHLORIDE FOR INHALATION 3 %
3 VIAL, NEBULIZER (ML) INHALATION
Status: DISPENSED | OUTPATIENT
Start: 2025-04-01

## 2025-04-01 RX ORDER — SODIUM CHLORIDE FOR INHALATION 3 %
3 VIAL, NEBULIZER (ML) INHALATION
Status: DISCONTINUED | OUTPATIENT
Start: 2025-04-01 | End: 2025-04-01

## 2025-04-01 RX ORDER — ALBUTEROL SULFATE 0.83 MG/ML
2.5 SOLUTION RESPIRATORY (INHALATION)
Status: DISPENSED | OUTPATIENT
Start: 2025-04-01

## 2025-04-01 RX ORDER — DEXTROSE MONOHYDRATE 100 MG/ML
INJECTION, SOLUTION INTRAVENOUS CONTINUOUS PRN
Status: ACTIVE | OUTPATIENT
Start: 2025-04-01

## 2025-04-01 RX ADMIN — HEPARIN SODIUM 1450 UNITS/HR: 10000 INJECTION, SOLUTION INTRAVENOUS at 04:23

## 2025-04-01 RX ADMIN — ALBUTEROL SULFATE 2.5 MG: 2.5 SOLUTION RESPIRATORY (INHALATION) at 15:37

## 2025-04-01 RX ADMIN — MAGNESIUM SULFATE HEPTAHYDRATE 2 G: 40 INJECTION, SOLUTION INTRAVENOUS at 00:18

## 2025-04-01 RX ADMIN — INSULIN HUMAN 0.5 UNITS/HR: 1 INJECTION, SOLUTION INTRAVENOUS at 02:11

## 2025-04-01 RX ADMIN — ALBUTEROL SULFATE 2.5 MG: 2.5 SOLUTION RESPIRATORY (INHALATION) at 19:40

## 2025-04-01 RX ADMIN — PANTOPRAZOLE SODIUM 40 MG: 40 INJECTION, POWDER, FOR SOLUTION INTRAVENOUS at 06:44

## 2025-04-01 RX ADMIN — CHLORHEXIDINE GLUCONATE 15 ML: 1.2 SOLUTION ORAL at 07:57

## 2025-04-01 RX ADMIN — ALBUTEROL SULFATE 2.5 MG: 2.5 SOLUTION RESPIRATORY (INHALATION) at 07:52

## 2025-04-01 RX ADMIN — SODIUM CHLORIDE 30 MG/ML INHALATION SOLUTION 3 ML: 30 SOLUTION INHALANT at 07:55

## 2025-04-01 RX ADMIN — BUDESONIDE INHALATION 0.5 MG: 0.5 SUSPENSION RESPIRATORY (INHALATION) at 07:53

## 2025-04-01 RX ADMIN — PIPERACILLIN AND TAZOBACTAM 3.38 G: 3; .375 INJECTION, POWDER, FOR SOLUTION INTRAVENOUS at 13:08

## 2025-04-01 RX ADMIN — FLUCONAZOLE 200 MG: 2 INJECTION, SOLUTION INTRAVENOUS at 03:34

## 2025-04-01 RX ADMIN — SODIUM CHLORIDE 30 MG/ML INHALATION SOLUTION 3 ML: 30 SOLUTION INHALANT at 15:37

## 2025-04-01 RX ADMIN — CHLORHEXIDINE GLUCONATE 15 ML: 1.2 SOLUTION ORAL at 00:19

## 2025-04-01 RX ADMIN — ALBUTEROL SULFATE 2.5 MG: 2.5 SOLUTION RESPIRATORY (INHALATION) at 01:59

## 2025-04-01 RX ADMIN — PIPERACILLIN AND TAZOBACTAM 3.38 G: 3; .375 INJECTION, POWDER, FOR SOLUTION INTRAVENOUS at 04:31

## 2025-04-01 RX ADMIN — HEPARIN SODIUM 1450 UNITS/HR: 10000 INJECTION, SOLUTION INTRAVENOUS at 21:10

## 2025-04-01 RX ADMIN — ALBUTEROL SULFATE 2.5 MG: 2.5 SOLUTION RESPIRATORY (INHALATION) at 11:14

## 2025-04-01 RX ADMIN — Medication 50 MCG: at 08:00

## 2025-04-01 RX ADMIN — DEXMEDETOMIDINE HYDROCHLORIDE 0.4 MCG/KG/HR: 400 INJECTION INTRAVENOUS at 03:58

## 2025-04-01 RX ADMIN — FORMOTEROL FUMARATE DIHYDRATE 20 MCG: 20 SOLUTION RESPIRATORY (INHALATION) at 07:54

## 2025-04-01 RX ADMIN — NOREPINEPHRINE BITARTRATE 0.03 MCG/KG/MIN: 0.02 INJECTION, SOLUTION INTRAVENOUS at 08:53

## 2025-04-01 RX ADMIN — VANCOMYCIN HYDROCHLORIDE 1000 MG: 1 INJECTION, SOLUTION INTRAVENOUS at 23:48

## 2025-04-01 RX ADMIN — PIPERACILLIN AND TAZOBACTAM 3.38 G: 3; .375 INJECTION, POWDER, FOR SOLUTION INTRAVENOUS at 21:10

## 2025-04-01 ASSESSMENT — ACTIVITIES OF DAILY LIVING (ADL)
ADLS_ACUITY_SCORE: 83
ADLS_ACUITY_SCORE: 73
ADLS_ACUITY_SCORE: 69
ADLS_ACUITY_SCORE: 83
ADLS_ACUITY_SCORE: 73
ADLS_ACUITY_SCORE: 69
ADLS_ACUITY_SCORE: 83
ADLS_ACUITY_SCORE: 69
ADLS_ACUITY_SCORE: 73
ADLS_ACUITY_SCORE: 73
ADLS_ACUITY_SCORE: 69
ADLS_ACUITY_SCORE: 83
ADLS_ACUITY_SCORE: 83
DEPENDENT_IADLS:: MONEY MANAGEMENT;MEDICATION MANAGEMENT;TRANSPORTATION
ADLS_ACUITY_SCORE: 83
ADLS_ACUITY_SCORE: 69

## 2025-04-01 NOTE — H&P
Intensivist Consult  3/31/2025     Name: Marly Cannon  : 1965  MRN: [id number]  PCP: Amberly Whyte         ASSESSMENT/PLAN:  59 year old woman with history of smoking, COPD on home O2, untreated ADEEL, HTN, CKD, T2DM, & a hospitalization 3/18-3/20 w/ DKA & dyspnea, admitted to OSH on 3/22 w/ DKA & an acute hypoxic respiratory failure in setting an LLL PE. She has been intubation on 3/22 & was transferred to [facility] on 3/31/2025 for ongoing ventilator weaning & possibly a tracheostomy. She is additionally being managed for an YADIRA, diastolic insufficiency, & VAP w/ RLL collapse.    Neuro/Psych:   #. Analgesia/sedation on mechanical ventilation.  #. Baseline intellectual disability w/ reported memory issues. Baseline functional status unknown, but notes during her prior hospitalization raise of a concern regarding her ability to live independently.   #. Essential tremor, continued on PTA primidone.   RASS 0/-1  Switch propofol to Precedex, fentanyl gtt for analgesia, PRNs available  Occupational & Physical therapy assessments to determine best discharge destination & level of required support    Pulmonary:   #. Acute on chronic hypoxic respiratory failure, PTA on supplemental O2 at 2 LPM.  #. Acute hypercapnic respiratory failure, managed w/ intubation 3/23.  #. RLL & RML collapse, presumably 2/2 mucus plugging (was not present on 3/22 CTA). S/p bronchoscopy w/ suctioning & BAL on 3/31.  #. COPD of unknown severity, no clear trend of exacerbation frequencies, non-adherent to a maintenance inhaler regimen at baseline. No clinical e/o an acute COPD exacerbation.  #. Untreated ADEEL d/t PAP intolerance.   #. Tobacco use disorder.  Goal SpO2 >/= 90%, wean off supplemental oxygen as able  Lung protective ventilation, vent bundle in place  Increased PEEP from 5 to 8 to allow us to titrate down her FiO2  SBT daily w/ plan for extubation as soon as it is safely feasible  Anticipate extubation to  BIPAP  Bronchoscopy 3/31  CT chest to reassess her lungs following the bronchoscopy  Bronchial hygiene w/ QID albuterol (avoiding the potential drying effect of ipratropium), BID 3% saline nebs (no evidence for routine use of Mucomyst in patients w/o CF bronchiectasis), & QID metaneb/Volera  Started scheduled budesonide & formoterol nebs  I suspect that patient is not able to effectively & correctly use her PTA Combivent & Stiolto -- she would be better served by discharging on an all-neb regimen for her suspected obstructive airway d/o (budesonide & formoterol nebs BID, DuoNeb QID, & albuterol PRN)    Cardiovascular:   #. Shock, vasoplegic and multifactorial. On norepinephrine 3/23 - 3/26, then for less than 30 minutes on 3/31.  #. Clinical evidence of diastolic insufficiency with peripheral edema. TTE 3/23 notable for hyperdynamic LV function with EF of 65 to 70%.  #. Paroxysmal atrial fibrillation, per chart only noted on admission on 3/22. In setting of a coinciding PE, suspect that patient will require lifelong anticoagulation.  #. Essential hypertension. Patient's PTA amlodipine and lisinopril were not started on admission.  #. Paroxysmal atrial fibrillation noted initially on her admission.   Goal MAP >/= 65 and SBP >/= 90  Ordered NE to have on-hand following brief episode of hypotension d/t bronchoscopy-related sedation (was not started)  Heparin drip for anticoagulation in setting of atrial fibrillation and PE    Infectious:  #. Possible VAP in light of patient's persistent mucus plugging & FiO2 needs.  #. R groin soft tissue infection w/o overt abscess or necrotizing fasciitis.  Resend blood cultures, UA with reflex to UC, urinary antigens, sputum studies, Fungitell, and galactomannan  Bronchoscopy with BAL of the RML and RLL performed immediately on patient's arrival, sample sent for laboratory evaluation  Continue fluconazole and Zosyn (since 3/25 and 3/23 respectively)  Add vancomycin for for VAP  coverage  Surgery consult for management of the soft tissue infection    Renal: baseline Cr ~1-1.1.   #. YADIRA, present on admission to [facility], initially worsened and subsequently improved. Highest value of 2.09 on 3/30.  #. Hypocalcemia, improved.  #. Lactic acidosis, improved.  Strict intake/output monitoring, avoiding nephrotoxins as able  Exchange Noguera catheter and collect UA via the new catheter  I am holding the scheduled bumetanide and metolazone started at OSH since at this point patient's EAV is not clear  Electrolyte replacements ordered  Check FENa & FEbun    GI:   #. Dilated gallbladder noted at OSH without any clinical findings concerning for cholecystitis.  #. Hypoalbuminemia, nonspecific considering her prolonged hospitalization.  #. Diet, on tube feeds at OSH.  RD consult placed  Antiemetic regimen ordered  Bowel regiment ordered    Heme:   #. Leukocytosis, resolved. Presumed elevation secondary to an active infection.  #. Coagulopathy with hypercoagulable state resulting in LLL PE, which progressed on therapeutic enoxaparin and is now being managed with a heparin drip. Etiology of any provoking condition is unknown -- possibly undiagnosed pAFib.  Continue heparin drip at this time  If patient remains hemodynamically stable, plan to transition to a DOAC in the coming days    Endo:   #. T2DM, poorly controlled at baseline. Remains on insulin drip since her admission on 3/22. Most recent HgbA1c is 12.7%.   #. DKA on admission 3/22, resolved.   #. Secondary hyperparathyroidism.  Will continue the insulin drip for now w/ a plan to transition to subcutaneous insulin in the next 24 hours      Access/Lines/Tubes: ETT, OG tube, CVC, arterial line, noguera catheter    Prophylaxis:   #. VAP ppx: HOB elevation, chlorhexidine  #. Stress ulcer: pantoprazole  #. Diet: NPO, Nutrition consult for TF orders  #. VTE: heparin gtt    CODE: FULL    Billing: This patient is critically ill: Yes. Total critical care time  "today 65 min, excluding procedures.     Anjali Segura MD  Pulmonary and Critical Care     Clinically Significant Risk Factors Present on Admission          # Hyperchloremia: Highest Cl = 109 mmol/L in last 2 days, will monitor as appropriate      # Hypocalcemia: Lowest iCa = 4.3 mg/dL in last 2 days, will monitor and replace as appropriate     # Hypoalbuminemia: Lowest albumin = 1.7 g/dL at 3/31/2025  5:35 PM, will monitor as appropriate   # Drug Induced Platelet Defect: home medication list includes an antiplatelet medication   # Hypertension: Noted on problem list     # Acute Hypoxic Respiratory Failure: Documented O2 saturation < 90%. Continue supplemental oxygen as needed       # DMII: A1C = 12.7 % (Ref range: <5.7 %) within past 6 months   # Obesity: Estimated body mass index is 38.45 kg/m  as calculated from the following:    Height as of 3/22/25: 1.651 m (5' 5\").    Weight as of this encounter: 104.8 kg (231 lb 0.7 oz).       # COPD: noted on problem list               HISTORY OF PRESENTING ILLNESS:  Marly Cannon is a 59 year old woman with history of intellectual disability & memory issues, COPD*on supplemental oxygen, untreated ADEEL, tobacco use d/o, HTN, pAFib, & poorly controlled T2DM w/ recent hospitalization for DKA (3/18 - 3/20), who presented to the hospital in [facility] on 3/22/2025 with complaints of dyspnea, hyperglycemia, and chest pain. She was found to have an YADIRA, LLL PE, DKA, and A-fib with RVR. She was started on an insulin drip, therapeutic dose enoxaparin, Zosyn for possible pneumonia, and diltiazem drip for management of her RVR. Her DKA has resolved, she was switched from diltiazem to amiodarone drip for management of A-fib with RVR; however, her degree of respiratory failure was still concerning and she was intubated 3/23 for management of worsening hypoxia. She had a repeat CTA that demonstrated progression of her PE and RLL collapse concerning for mucous plugging and pneumonia. She was " switched from enoxaparin to heparin drip and her antibiotics were broadened to vancomycin and Zosyn. She was on norepinephrine for several days d/t hypotension attributed to sedation & sepsis-related vasoplegia. It appears that she has been doing fairly long SBTs since 3/26, which seemed to be going reasonably well per RT documentation. Primary team has been having issues w/ progressively increasing FiO2 needs. She had a sputum culture grow Candida & has been on fluconazole since 3/25. She has remained on the insulin drip, presumably to manage the persistent hyperglycemia during her steroid burst (for the presumed COPD exacerbation). She has received a fairly large volume of documented fluids, & was started on diuresis 3/30. She was transferred to [facility] d/t concerns that she will require a tracheostomy secondary to her inability to liberate from the vent.    Fluconazole 3/23 - current  Vancomycin 3/24 - 3/27  Zosyn 3/23 - current    PCT 3.23 on admission to 0.66 on 3/29. Most recent SBT on 3/29 for 4.5 hours at 8/5.    *patient had PFTs notable for severe obstruction & air trapping. However, these results are compromised by patient's inability to perform the actual testing -- thus the degree & true nature of her underlying lung disease is not particularly clear. She does not use her prescribed Symbicort & uses her Combivent & Spiriva only as needed.     REVIEW OF SYSTEMS: Unable to obtain, patient is intubated & sedated    MEDICAL HISTORY:  has no past medical history of Breast cancer (H), Cyst of breast, Inverted nipple, Malignant neoplasm of ovary (H), or Need for prophylactic hormone replacement therapy (postmenopausal).  SURGICAL HISTORY:  has a past surgical history that includes Colonoscopy (N/A, 08/20/2015); Colonoscopy (N/A, 09/21/2018); and Biopsy breast (Left, 02/14/2008).  SOCIAL HISTORY:  reports that she has quit smoking. Her smoking use included cigarettes. She started smoking about 46  years ago. She has a 46.2 pack-year smoking history. She has been exposed to tobacco smoke. She has never used smokeless tobacco. She reports that she does not drink alcohol and does not use drugs.  FAMILY HISTORY: family history includes Diabetes in her father, mother, and sister; Hypertension in her brother.  MEDICATIONS: personally reviewed, including EMR/Care Everywhere. Pertinent information noted & updated.     ALLERGIES:  No Known Allergies    PHYSICAL EXAM:  Temp:  [96.8  F (36  C)-98.1  F (36.7  C)] 97.8  F (36.6  C)  Pulse:  [66-86] 66  Resp:  [16-33] 16  BP: ()/(46-78) 83/52  MAP:  [58 mmHg-88 mmHg] 75 mmHg  Arterial Line BP: ()/(45-70) 123/56  FiO2 (%):  [55 %-70 %] 60 %  SpO2:  [88 %-96 %] 96 %  FiO2 (%): 60 %, Resp: 16, Inspiratory Pressure (cm H2O) (Drager Elaina): 13, Vent Mode: CMV/AC, Resp Rate (Set): 16 breaths/min, Tidal Volume (Set, mL): 370 mL, PEEP (cm H2O): 5 cmH2O, Pressure Support (cm H2O): 8 cmH2O, Resp Rate (Set): 16 breaths/min, Tidal Volume (Set, mL): 370 mL, PEEP (cm H2O): 5 cmH2O    Physical Exam:   General: lying in bed, NAD  HEENT: orally intubated, ETT secured, pupils symmetric & minimally reactive to light, MMM  CV: RRR, no M/R/G; extremities well perfused  Pulm: mechanical breath sounds, intermittent expiratory wheezing on the R, no rhonchi, no crackles, no cough  Abd: soft, ND, NT, quiet bowel sounds  Msk: warm to touch, 2+ pitting peripheral edema  Derm/: erythematous thickening of the R labia majora & mons pubis  Neuro: sedated  Psych: sedated    LABS: I personally reviewed all available & pertinent laboratory studies w/in the EMR.    IMAGING/STUDIES: I personally reviewed all available & pertinent  imaging studies w/in the EMR.    This report was prepared using speech recognition software.  Any typographical errors are unintentional.  Please, contact me directly for any clarifications of my report.      PROVIDER RESTRAINT FOR NON-VIOLENT BEHAVIOR FACE TO FACE  EVALUATION    Patient's Immediate Situation:  Patient demonstrated the following behaviors: pulling at/on lines/tubes/equipment, unable to redirect from harmful/self-injurious behavior    Patient's Reaction to the intervention:  Does patient understand the reason for restraint/seclusion? Unable to express    Medical Condition:  Is there any evidence of compromise of Skin integrity, Respiratory, Cardiovascular, Musculoskeletal, Hydration? No    Behavioral Condition:  In consultation with the RN, is there a need to continue this restraint or seclusion? Yes    See Restraint Flowsheet for complete restraint documentation and assessment.    Anjali Segura MD

## 2025-04-01 NOTE — CONSULTS
Care Management Initial Consult    General Information  Assessment completed with: Other (PHILOMENA Villegas),    Type of CM/SW Visit: Initial Assessment    Primary Care Provider verified and updated as needed: Yes   Readmission within the last 30 days:  (transfer from [facility])         Advance Care Planning: Advance Care Planning Reviewed:  (POLST)          Communication Assessment  Patient's communication style: spoken language (English or Bilingual)             Cognitive  Cognitive/Neuro/Behavioral: .WDL except, level of consciousness, arousability, motor response, orientation  Level of Consciousness: sedated  Arousal Level: arouses to voice  Orientation: other (see comments) (JANIS)  Mood/Behavior: calm     Speech: endotracheal tube    Living Environment:   People in home: significant other     Current living Arrangements: apartment      Able to return to prior arrangements: yes       Family/Social Support:  Care provided by: self  Provides care for: no one  Marital Status: Lives with Significant Other  Support system: Significant Other          Description of Support System: Supportive, Involved         Current Resources:   Patient receiving home care services: Yes  Skilled Home Care Services: Skilled Nursing     Community Resources: None  Equipment currently used at home: none  Supplies currently used at home: None    Employment/Financial:  Employment Status: retired        Financial Concerns:             Does the patient's insurance plan have a 3 day qualifying hospital stay waiver?  Needs verification    Lifestyle & Psychosocial Needs:  Social Drivers of Health     Food Insecurity: Low Risk  (3/18/2025)    Food Insecurity     Within the past 12 months, did you worry that your food would run out before you got money to buy more?: No     Within the past 12 months, did the food you bought just not last and you didn t have money to get more?: No   Depression: Not at risk (2/16/2024)    PHQ-2     PHQ-2 Score: 0    Housing Stability: Low Risk  (3/18/2025)    Housing Stability     Do you have housing? : Yes     Are you worried about losing your housing?: No   Tobacco Use: Medium Risk (3/22/2025)    Patient History     Smoking Tobacco Use: Former     Smokeless Tobacco Use: Never     Passive Exposure: Current   Financial Resource Strain: Low Risk  (3/18/2025)    Financial Resource Strain     Within the past 12 months, have you or your family members you live with been unable to get utilities (heat, electricity) when it was really needed?: No   Alcohol Use: Not on file   Transportation Needs: Low Risk  (3/18/2025)    Transportation Needs     Within the past 12 months, has lack of transportation kept you from medical appointments, getting your medicines, non-medical meetings or appointments, work, or from getting things that you need?: No   Physical Activity: Unknown (5/16/2024)    Exercise Vital Sign     Days of Exercise per Week: 0 days     Minutes of Exercise per Session: Not on file   Interpersonal Safety: Low Risk  (3/18/2025)    Interpersonal Safety     Do you feel physically and emotionally safe where you currently live?: Yes     Within the past 12 months, have you been hit, slapped, kicked or otherwise physically hurt by someone?: No     Within the past 12 months, have you been humiliated or emotionally abused in other ways by your partner or ex-partner?: No   Stress: No Stress Concern Present (5/16/2024)    [employer] - Occupational Stress Questionnaire     Feeling of Stress : Not at all   Social Connections: Unknown (5/16/2024)    Social Connection and Isolation Panel [NHANES]     Frequency of Communication with Friends and Family: Not on file     Frequency of Social Gatherings with Friends and Family: More than three times a week     Attends Adventism Services: Not on file     Active Member of Clubs or Organizations: Not on file     Attends Club or Organization Meetings: Not on file      Marital Status: Not on file   Health Literacy: Not on file       Functional Status:  Prior to admission patient needed assistance:   Dependent ADLs:: Independent  Dependent IADLs:: Money Management, Medication Management, Transportation       Mental Health Status:          Chemical Dependency Status:                Values/Beliefs:  Spiritual, Cultural Beliefs, Zoroastrianism Practices, Values that affect care:                 Discussed  Partnership in Safe Discharge Planning  document with patient/family: No    Additional Information:    Assessment completed with [facility] Nelly [phone].  Patient lives in an apartment with her significant other Tavo. They have been together for 18 years or more. Patient has mild cognitive disability.  She is independent with ADLs and most IADLS, except money management, medications set up and driving.  She ambulates with no devices and will walk or take the bus for transportation. She is retired from working. She has home RN weekly visits for medication set up. Will need to verify name of home care agency.  Per Nelly, patient does not have HCD designating health agent but Tavo would know her best for decision making.       Next Steps:     Follow for progression and recommendations.      Cece Em RN

## 2025-04-01 NOTE — PHARMACY-ADMISSION MEDICATION HISTORY
PTA medication list was updated on 3/18/25 by the medication scribe at the [facility].

## 2025-04-01 NOTE — CONSULTS
4/1 Test claim for DOAC'S- Both Eliquis and Xarelto are covered 100% no cost for the patient. Thank you for allowing me to help with your patient  Nikkie Mukesh [facility]  Pharmacy Discharge Liaison   [facility] locations  Available on [employer]  Phone [phone] Fax [phone]    Disclaimer: Pharmacy test claims are estimates and may not reflect final costs. Suggested alternatives aim to be cost-effective but may not be therapeutically equivalent as this consult is informational and does not constitute medical advice. Clinical decisions should be made by qualified healthcare providers.

## 2025-04-01 NOTE — PROGRESS NOTES
Patient was extubated from mechanical ventilator at 1250. Placed on HFNC 50LPM 40% per MD request.     Holly Lopes, RT

## 2025-04-01 NOTE — PLAN OF CARE
"Patient was transferred to [facility]  at approximately 7:00 PM via [facility].   Patient status stable. Patient's most recent vitals: Blood pressure 116/78, pulse 75, temperature 97.8  F (36.6  C), temperature source Tympanic, resp. rate 20, height 1.651 m (5' 5\"), weight 109.4 kg (241 lb 2.9 oz), SpO2 95%.  Pt transferred with intact IV.Yes  Intact IV site at Right CVC  Pt transferred with oxygen. .Yes   O@2via ETT at O2 65%  Other LDA\"s: Landrum, ART line, drain  Belongings were sent with Patient and Family  AVS and pertinent records sent with patient. .Yes   Report given to KHADIJAH Alexandre in SBAR format.      Patient remains intubated and sedated RASS score -2 to -3  Vent settings unchanged. VSS, afebrile. CPOT 0 Heparin and insulin gtts continue. Propofol titrated to maintain ordered RASS goal. LS clear, diminished to RLL. Scant secretions with oral and inline suctioning. HRR. NSR 70s. Tube feedings continue, residuals as charted. BS active. Insulin gtt continued, see MAR. Landrum catheter with adequate output of clear, yellow urine. Redness to perineum. Penrose drain placed by surgeon to perianal abscess this shift. Mepliex dressings placed to shear injuries to buttocks. Generalized edema remains. ART line WDL. CVC dressing CDI, blue and white lumens infusing, brown lumen SL with blood return noted. Repositioned and oral cares completed q2H. Restraints removed for transport.                        "

## 2025-04-01 NOTE — PHARMACY-VANCOMYCIN DOSING SERVICE
"Pharmacy Vancomycin Initial Note  Date of Service 2025  Patient's  1965  59 year old, female    Indication: Ventilator-Associated Pneumonia    Current estimated CrCl = Estimated Creatinine Clearance: 38.6 mL/min (A) (based on SCr of 1.84 mg/dL (H)).    Creatinine for last 3 days  3/29/2025:  5:09 AM Creatinine 2.01 mg/dL  3/30/2025:  4:45 AM Creatinine 2.09 mg/dL  3/31/2025:  4:49 AM Creatinine 1.96 mg/dL;  5:35 PM Creatinine 1.84 mg/dL    Recent Vancomycin Level(s) for last 3 days  No results found for requested labs within last 3 days.      Vancomycin IV Administrations (past 72 hours)        No vancomycin orders with administrations in past 72 hours.                    Nephrotoxins and other renal medications (From now, onward)      Start     Dose/Rate Route Frequency Ordered Stop    25 2300  vancomycin (VANCOCIN) 1,000 mg in 200 mL dextrose intermittent infusion        Placed in \"Followed by\" Linked Group    1,000 mg  200 mL/hr over 1 Hours Intravenous EVERY 24 HOURS 25 230  vancomycin (VANCOCIN) 2,500 mg in 0.9% NaCl 525 mL intermittent infusion        Placed in \"Followed by\" Linked Group    2,500 mg  over 120 Minutes Intravenous ONCE 25  piperacillin-tazobactam (ZOSYN) 3.375 g vial to attach to  mL bag        Note to Pharmacy: For [facility], [facility] and [facility]: For Zosyn-naive patients, use the \"Zosyn initial dose + extended infusion\" order panel.    3.375 g  over 240 Minutes Intravenous EVERY 8 HOURS 25              Contrast Orders - past 72 hours (72h ago, onward)      None            [employer] Prediction of Planned Initial Vancomycin Regimen  Loading dose: 2500 mg IV x 1  Regimen: 1000 mg IV every 24 hours.  Start time: 21:42 on 2025  Exposure target: AUC24 (range)400-600 mg/L.hr   AUC24,ss: 491 mg/L.hr  Probability of AUC24 > 400: 96 %  Ctrough,ss: 15.8 mg/L  Probability of Ctrough,ss > 20: 9 %  Probability of " nephrotoxicity (Lodise GEORGE 2009): 11 %        Plan:  Start vancomycin  2500 mg IV x 1 followed by 1000 mg IV q24h.   Vancomycin monitoring method: AUC  Vancomycin therapeutic monitoring goal: 400-600 mg*h/L  Pharmacy will check vancomycin levels as appropriate in 1-3 Days.    Serum creatinine levels will be ordered daily for the first week of therapy and at least twice weekly for subsequent weeks.      Hema Capps RPH

## 2025-04-01 NOTE — PLAN OF CARE
Goal Outcome Evaluation:      Plan of Care Reviewed With: patient    Overall Patient Progress: improvingOverall Patient Progress: improving    Outcome Evaluation: SBT today then extubation. A/Ox3, denies pain.    [facility] - ICU    RN Progress Note:            Pertinent Assessments:      Please refer to flowsheet rows for full assessment     Patient extubated, clear speech, able to make needs known but deconditioned. Started ice chips with no issues, needs additional dysphagia screening.     Patient denies pain, hoyered into chair.            Key Events - This Shift:       Extubated, up in chair, Art line to be removed      RN Managed Protocols Ordered:  Yes  Protocols:Potassium, Magnesium, Phosphorus, and Heparin  PRN'S:  Protocols Status: Endorsed to Oncoming RN All recheck in the morning                Barriers to Discharge / Downgrade:     Deconditioned, continued heparin drip and insulin drip         Point of Contact Update: YES-OR-NO: Yes  Spoke with sister Mary. She would like to speak to patient on the phone this evening if she is up for it.

## 2025-04-01 NOTE — PROGRESS NOTES
Pt arrived on ICU post op @ 1737  Pt has a 7.5 ETT in place and secured at 21cm at the teeth.  Bilateral breath sounds noted t/o and diminished  Pt placed on Drager vent r=18  400ml peep 5  60%O2  CXR and ABG pending  Continue to monitor pt closely.

## 2025-04-01 NOTE — PROCEDURES
Procedure:  Bronchoscopy  Indication:  lobar collapse & consolidation    Providers: Anjali Segura MD  Medications: please, refer to the MAR & nursing documentation.  4 ml 1% Lidocaine  4 mg midazolam  200 mcg fentanyl    Consent: procedure was performed emergently.    The patient was assessed for the adequacy for the procedure and to receive medications. She is intubated on the mechanical ventilation, sedated.  Mental Status: sedated  Airway examination: ETT in place  Pulmonary: intermittent expiratory wheeze on the R  CV:  RRR, no m/g/r  ASA thGthrthathdtheth:th th4th After clinical evaluation and reviewing the indication, risks, alternatives and benefits of the procedure the patient was deemed to be in satisfactory condition to undergo the procedure. Time Out was performed per protocol.     Immediately before administration of medications the patient was re-assessed for adequacy to receive sedatives including the heart rate, respiratory rate, mental status, oxygen saturation, blood pressure and adequacy of pulmonary ventilation. These same parameters were continuously monitored throughout the procedure.             Maneuvers / Procedure:   The flexible bronchoscope was introduced through the ETT. Observed trachea of normal caliber with a sharp carlos. Airways are erythematous throughout. ETT filled with mucus concretions that were managed w/ suctioning & some saline instillation for moisture. Bronchoscope was advanced to the RML. Notable findings include erythematous airways w/ thin clear secretions being suctioned intermittently. BAL of the RML was performed: 150 mL sterile saline instilled with 40 mL return of cellular fluid. I then attempted a BAL of the RLL (anterior segment) with only 8 mL return of clear fluid following instillation of 120 mL of sterile saline. Fluid was sent to the laboratory for evaluation.     Complications: none. Patient tolerated the procedure well without undue discomfort, hypotension or arrhythmia,  or hypoxia.  Estimated Blood Loss: none    The procedure was performed in the ICU.    Anjali Segura MD  Pulmonary and Critical Care

## 2025-04-01 NOTE — PROGRESS NOTES
"Critical Care Progress Note     04/01/2025     Name: Marly Cannon MRN#: [id number]   Age: 59 year old YOB: 1965        Summary     Marly Cannon 60 yo F past medicine history of intellectual disability, COPD-emphysema with severe obstruction & air-trapping, active tobacco smoker, FVL evidence of fixed upper airway obstruction, obesity, ADEEL with PAP nonadherence, secondary polycythemia, HTN, T2DM, & CKD 3. Recent admission at  [facility] 3/18 - 3/20/22 for DKA, hypoxemic respiratory insufficiency & condylomata acuminata in the setting of medication & PAP nonadherence or inability to care for self; discharged with insulin pump, 2 LPM NC O2, & acyclovir     Presented 3/22/25 to [facility] ED for chest pain, dyspnea, & reduced level of consciousness. Found to have acute toxic-metabolic encephalopathy, DKA, AF RVR, preserved biventricular systolic function, distributive shock, acute hypoxemic-hypercapnic respiratory failure requiring invasive ventilation (3/23 - 4/1/25), acute exacerbation of COPD, segmental & subsegmental PE, right lower lobe atelectasis without evidence of mucus plugging (3/31/25 bronchoscopy), ventilator associated pneumonia, reji-anal abscess s/p I&D with penrose drain placement (3/31), & non-oliguric YADIRA      Interval Events     Low dose NE. Performed well of PST. Extubated     Surgery consultation     Stop fluconazole for Candida in sputum       Assessment & Plan      Goals of Care:  Life prolonging without limits      Neurology, Psychiatry, Sedation, Analgesia:  Alert & oriented     Acute metabolic encephalopathy - resolved     Intellectual disability noted in the chart    Cardiovascular:  Septic shock   Warm extremities. Edematous. 3/24/25 cardiac POCUS LVEF 65-70%, normal RV \"size & structure,\" no valvular disease, no pericardial effusion.   - NE goal MAP>65    Atrial fibrillation - resolved     Respiratory, Airway:  Acute hypoxemic respiratory failure   Ventilator associated " pneumonia, bilateral lower lobe atelectasis   3/31/25 bronchoscopy did not demonstrate mucus plugging. 3/31/25 CT chest demonstrated right middle lobe ground glass opacities, complete right lower lobe & nearly complete left lower lobe atelecatsis &/or consolidation, small left greater than right pleural effusion    - pulmonary hygiene regimen: albuterol nebs q4h, saline nebs q8h, PT/OT, out of bed to chair as able   - antimicrobials & infectious evaluation, as below     Segmental & subsegmental pulmonary emboli   - heparin gtt     Severe COPD   History of COPD-emphysema with severe obstruction & air-trapping. 1/9/24 FEV 39% predicted, 0.98. FVL evidence of fixed upper airway obstruction; unremarkable laryngoscopy (11/2024) & no history of bronchoscopy.     Small bilateral pleural effusion     ADEEL PAP nonadherence   - BPAP 22/18 at bedtime per titration 8/16/23     Gastrointestinal:  Normal hepatobiliary indices     Obesity     Protein calori malnutrition   - nutrition consulted     Renal, Acid-base, Electrolytes, Volume:  Non-oliguric YADIRA on CKD  Baseline Cr 1.1 - 1.3   - monitor     Anasarca   Likely multifactorial in the setting of prolonged ICU admission, presumed excessive crystalloid administration, malnutrition with low oncotic pressure, ADEEL with fluid retention, et al. No known diastolic dysfunction.   - goal net negative fluid balance, achieved thus far without diuresis     Infectious Disease:  Ventilator associated pneumonia   WBC & procal downtrending, 3/31/25 CT chest demonstrated right middle lobe ground glass opacities, complete right lower lobe & nearly complete left lower lobe atelecatsis &/or consolidation   - pip-tazo (3/23 - current), day 10   - 3/31/25 BAL studies pending     Georgie-anal abscess   3/31 s/p I&D with penrose drain placement   - surgery consulted   - vancomycin (3/31 - current)  - pip-tazo, as above     Hematology, Oncology:  Leukocytosis secondary to physiologic stress vs sepsis      Endocrine:  T2DM, poorly controlled   A1c 12.7  - remains on insulin gtt     Checklist:  FEN: NPO  VTE ppx: heparin gtt for PE's  GI ppx: not indicated   Bowel regimen: PRNs  Lines/tube-size: R-internal jugular CVC, remove art line, noguera  Skin: per-anal abscess with penrose drain, Bilateral Buttocks, Stage 2 Pressure Injury, Present on Admission   PT/OT/SLP, early mobility: PT/OT consulted   Code Status: full       Physical Exam      Neurologic: Alert. Opens eyes, tracks, & follows commands in all extremities.  HEENT: Head and face normal. No nasal discharge. Oropharynx normal. Eyelids, conjunctiva, & sclera normal.   Neck: Neck appearance normal. No neck masses. Thyroid not enlarged.  Respiratory: Lungs clear bilaterally. No wheezes, crackles, or rhonchi.   Cardiovascular: Regular rate & rhythm  Normal S1 & S2. No murmurs  Gastrointestinal: Bowel sounds present. Obese. Soft. Nontender   Musculoskeletal: Skeletal configuration normal and muscle mass normal for age. Joint appearance overall normal.  Skin, Hair, & Nails: Skin color normal. No skin lesions.  Hair & nails normal.  Extremities: Anasarca      All pertinent vital signs, ventilator settings, I&Os, laboratory, microbiology, ECGs, & imaging data has been personally reviewed. Total Critical Care time, excluding procedures, was 50 minutes     ROSEANNE Coreas MD  Critical Care Medicine

## 2025-04-01 NOTE — CONSULTS
[facility] Nurse Inpatient Assessment     Consulted for: Buttocks    Summary: RN reported patient can be difficult to turn    WO nurse follow-up plan: weekly    Patient History (according to provider note(s):      Marly Cannon is a 59 year old woman with history of intellectual disability & memory issues, COPD*on supplemental oxygen, untreated ADEEL, tobacco use d/o, HTN, pAFib, & poorly controlled T2DM w/ recent hospitalization for DKA (3/18 - 3/20), who presented to the hospital in [facility] on 3/22/2025 with complaints of dyspnea, hyperglycemia, and chest pain. She was found to have an YADIRA, LLL PE, DKA, and A-fib with RVR. She was started on an insulin drip, therapeutic dose enoxaparin, Zosyn for possible pneumonia, and diltiazem drip for management of her RVR. Her DKA has resolved, she was switched from diltiazem to amiodarone drip for management of A-fib with RVR; however, her degree of respiratory failure was still concerning and she was intubated 3/23 for management of worsening hypoxia. She had a repeat CTA that demonstrated progression of her PE and RLL collapse concerning for mucous plugging and pneumonia. She was switched from enoxaparin to heparin drip and her antibiotics were broadened to vancomycin and Zosyn. She was on norepinephrine for several days d/t hypotension attributed to sedation & sepsis-related vasoplegia. It appears that she has been doing fairly long SBTs since 3/26, which seemed to be going reasonably well per RT documentation. Primary team has been having issues w/ progressively increasing FiO2 needs. She had a sputum culture grow Candida & has been on fluconazole since 3/25. She has remained on the insulin drip, presumably to manage the persistent hyperglycemia during her steroid burst (for the presumed COPD exacerbation). She has received a fairly large volume of documented fluids, & was started on diuresis 3/30. She was transferred to [facility]  Hospital d/t concerns that she will require a tracheostomy secondary to her inability to liberate from the vent.     Assessment:      Pressure Injury Location: Bilateral buttocks    4/1    Last photo: 4/1  Wound type: Pressure Injury and Friction     Pressure Injury Stage: 2, present on admission   Wound history/plan of care:   there is a darker area to the gluteal cleft with some maceration. Unclear if it is a developing DTPI but will monitor for changes    Wound base: 100 % Epidermis     Palpation of the wound bed: normal      Drainage: scant     Description of drainage: serosanguinous     Measurements (length x width x depth, in cm) L 3 x 3 x 0.1cm ; R 2  x 2.4  x  0.1 cm      Tunneling N/A     Undermining N/A  Periwound skin: Intact      Color: normal and consistent with surrounding tissue      Temperature: normal   Odor: none  Pain: mild, tender  Pain intervention prior to dressing change: slow and gentle cares   Treatment goal: Infection control/prevention, Protection, and Promote epidermal migration  STATUS: initial assessment  Supplies ordered: supplies stored on unit    My PI Risk Assessment     Sensory Perception: 3 - Slightly Limited     Moisture: 2 - Very moist      Activity: 1 - Bedfast      Mobility: 2 - Very limited     Nutrition: 2 - Probably inadequate      Friction/Shear: 2 - Potential problem      TOTAL: 12     Treatment Plan:     Bilateral buttocks wounds: Every 3 days   Cleanse the area with wound cleanser and pat dry.  Apply No sting film barrier to periwound skin.  Cover wound with Sacral Mepilex (#[id number])  Change dressing Q 3 days.  Turn and reposition Q 2hrs side to side only.  Ensure pt has Pablo-cushion while sitting up in the chair.  If not in ICU: Ensure air pump on bed and set to Isoflex.  FYI- If pt has constant incontinent loose stools needing dressing changes Q shift please discontinue the Mepilex dressing and apply criticaid barrier paste BID and PRN.      Orders: Written    RECOMMEND  PRIMARY TEAM ORDER: None, at this time  Education provided: plan of care, wound progress, Moisture management, and Off-loading pressure  Discussed plan of care with: Patient and Nurse  Notify [facility] if wound(s) deteriorate.  Nursing to notify the Provider(s) and re-consult the [facility] Nurse if new skin concern.    DATA:     Current support surface: Standard  Low air loss (SIMIN pump, Isolibrium, Pulsate)  Containment of urine/stool: Incontinence Protocol  BMI: Body mass index is 38.19 kg/m .   Active diet order: Orders Placed This Encounter      NPO for Medical/Clinical Reasons Except for: NPO but receiving Tube Feeding     Output: I/O last 3 completed shifts:  In: 949.2 [I.V.:209.2; IV Piggyback:740]  Out: 2575 [Urine:2400; Emesis/NG output:175]     Labs:   Recent Labs   Lab 04/01/25  0418 03/31/25  2206   ALBUMIN  --  1.8*   HGB 12.2 12.0   INR  --  1.24*   WBC 11.8* 10.7     Pressure injury risk assessment:   Sensory Perception: 2-->very limited  Moisture: 3-->occasionally moist  Activity: 1-->bedfast  Mobility: 2-->very limited  Nutrition: 2-->probably inadequate  Friction and Shear: 1-->problem  Vicente Score: 11    SHIRA FryeN RN CWOCN  Pager no longer in use, please contact through [employer] group: [facility]

## 2025-04-01 NOTE — PROGRESS NOTES
RT PROGRESS NOTE    VENT DAY# 2    CURRENT SETTINGS:   FiO2 (%): 50 %, Resp: 20, Inspiratory Pressure (cm H2O) (Drager Elaina): 13, Vent Mode: CMV/AC, Resp Rate (Set): 20 breaths/min, Tidal Volume (Set, mL): 450 mL, PEEP (cm H2O): (S) 10 cmH2O, Pressure Support (cm H2O): 8 cmH2O, Resp Rate (Set): 20 breaths/min, Tidal Volume (Set, mL): 450 mL, PEEP (cm H2O): (S) 10 cmH2O    PATIENT PARAMETERS:  PIP 31  Pplat:  20  Pmean:  8  Compliance: 40.2  Secretions:  small white thin  02 Sats:  91%  BS: Diminished    ETT SIZE 7.5 Secured at 22 cm at teeth/gums    Respiratory Medications: Perforomist BID, Pulmicort BID, Albuterol Q6, Nebusal Q12    ABG: 3/31/2025@ 2345 pH 7.42; pCO2 42; pO2 68; HCO3 28, %O2 Sat 93, BE 2.8 on 50%    NOTE / SHIFT SUMMARY:     Patient transferred here from [place] to [facility] ICU , on full vent support. Bronch done. CT transport without complications.     Kristina Bates, RT

## 2025-04-01 NOTE — PROGRESS NOTES
Respiratory Therapy Procedure Note  aMrly Cannon is a 59 year old female     Pre-Procedure Vitals:  SpO2: 96% on 60  HR: 70  RR:16 BS: diminished  BP: 109/83  ETCO2: 31    Summary of Care during Procedure:   Bronchoscopy done today at 2200   4cc of 4% lido given  Bronchoscopy procedure was done successfully without any complication.  BAL  were done from [facility], samples sent.  Patient on 100% pre-procedure,     Post-Procedure Vitals:  SpO2: 97% on 50  HR: 68  RR: 20  BS: diminished  BP: 136/57  ETCO2: 32     Kristina Bates, RT 3/31/2025 10:38 PM

## 2025-04-01 NOTE — PLAN OF CARE
Goal Outcome Evaluation:      Plan of Care Reviewed With: patient    Overall Patient Progress: no changeOverall Patient Progress: no change    Outcome Evaluation: PEEP increased overnight due to decrease in O2 sats.    [facility] - ICU    RN Progress Note: [id number]            Pertinent Assessments:      Please refer to flowsheet rows for full assessment     RASS -1 most of night, opens eyes to voice, shakes head no when asked if having pain. Able to pick legs up with assistance from bed.  Required increased PEEP overnight, O2 sats have improved to 92-93%.           Key Events - This Shift:       Remains on insulin drip, glucose levels increasing overnight requiring increases in drip.  Sedation with fentanyl and precedex, increased for CT scan but able to decrease throughout night.  Receiving multiple antibiotics, including vanco, zosyn, and diflucan.  OG to LIS with around 200 mL output.  Urine output for shift = 1650 mL     RN Managed Protocols Ordered:  Yes  Protocols:Potassium, Magnesium, Phosphorus, and Heparin  PRN'S: no  Protocols Status: Endorsed to Oncoming RN Potassium 4.3, Magnesium 2.3, Phosphorus 4.9, Heparin Xa 0.51, all within range and rechecks in AM.                Barriers to Discharge / Downgrade:     Remains intubated and sedated.

## 2025-04-01 NOTE — CONSULTS
"General Surgery Consultation  Marly Cannon MRN# [id number]   Age/Sex: 59 year old female YOB: 1965     Reason for consult:  Right groin abscess       Requesting physician: Dr. Nathaniel Coreas                   Assessment and Plan:   Assessment:  Right groin abscess  Ms. Cannon is a 58 yo F with PMH of COPD (on home O2), HTN, CKD, DM II who was admitted to OSH on 3/22/25-3/31/25 with DKA and acute hypoxic respiratory failure in setting of LLL PE. Hospital course also complicated by YADIRA, diastolic insufficiency, ventilator acquired pneumonia and chronic decubitus ulcer with draining sinus now s/p bedside I&D and Penrose drain placement on 3/31. Patient remains intubated and sedated on low dose Levophed for hypotension, insulin gtt and broad spectrum antibiotics. Right labia/perineum with 1/4\" Penrose drain in place draining malodorous brown/purulent fluid. Right labia, mons pubis and groin with erythema and induration consistent with cellulitis. Labs with mild leukocytosis. Will continue to monitor wound closely, no indication for further surgical intervention at this point given drain in place and no abscess noted on CT soft tissue pelvis yesterday.     Plan:  - NPO with OG tube  - Continue IV antibiotics  - Continue local wound care to right groin and Penrose drain  - Appreciate excellent ICU cares  - General surgery will continue to follow          Chief Complaint:   No chief complaint on file.       History is obtained from the electronic health record. Patient intubated and sedated, unable to provide additional history.     HPI:   Marly Cannon is a 59 year old female with PMH of COPD (on home O2), HTN, CKD, DM II who was admitted to OSH on 3/22/25-3/31/25 with DKA and acute hypoxic respiratory failure in setting of LLL PE and who was subsequently transferred to [facility] for higher level of care. Patient is intubated and sedated, unable to provide further history. Per bedside RN, penrose " drain in place to right perineum and actively draining.           Past Medical History:   No past medical history on file.           Past Surgical History:     Past Surgical History:   Procedure Laterality Date    BIOPSY BREAST Left 02/14/2008    patient states no bx, Clip in left breast/ stereo bxc 2-- no path in EPIC that far back    COLONOSCOPY N/A 08/20/2015    Procedure: COLONOSCOPY;  Surgeon: Gentry Porter MD;  Location: [facility] OR    COLONOSCOPY N/A 09/21/2018    Procedure: COLONOSCOPY;  COLONOSCOPY WITH POLYPECTOMY;  Surgeon: Gentry Porter MD;  Location: [facility] OR             Social History:    reports that she has quit smoking. Her smoking use included cigarettes. She started smoking about 46 years ago. She has a 46.2 pack-year smoking history. She has been exposed to tobacco smoke. She has never used smokeless tobacco. She reports that she does not drink alcohol and does not use drugs.           Family History:     Family History   Problem Relation Age of Onset    Diabetes Mother     Diabetes Father     Hypertension Brother     Diabetes Sister               Allergies:   No Known Allergies           Medications:     Prior to Admission medications    Medication Sig Start Date End Date Taking? Authorizing Provider   acetaminophen (TYLENOL) 325 MG tablet Take 325 mg by mouth every 6 hours as needed for mild pain.    Reported, Patient   acyclovir (ZOVIRAX) 400 MG tablet Take 1 tablet (400 mg) by mouth 3 times daily for 7 days. 3/20/25 3/27/25  Bibi Cancino MD   albuterol (PROAIR HFA/PROVENTIL HFA/VENTOLIN HFA) 108 (90 Base) MCG/ACT inhaler Inhale 2 puffs into the lungs daily as needed for shortness of breath.    Reported, Patient   amLODIPine (NORVASC) 5 MG tablet Take 1 tablet (5 mg) by mouth daily 5/16/24   Amberly Whyte NP   aspirin (ASPIRIN LOW DOSE) 81 MG chewable tablet CHEW AND SWALLOW 1 TABLET BY MOUTH DAILY 12/4/24   Amberly Whyte NP   atorvastatin (LIPITOR) 40 MG tablet TAKE 1  TABLET BY MOUTH AT BEDTIME 7/23/24   Amberly Whyte NP   blood glucose (ONETOUCH ULTRA) test strip USE TO TEST BLOOD SUGAR 4 TIMES DAILY  Patient taking differently: No sig reported 1/29/24   Amberly Whyte NP   Cholecalciferol (VITAMIN D3) 50 MCG (2000 UT) CAPS TAKE 1 CAPSULE BY MOUTH DAILY 1/28/25   Amberly Whyte NP   Continuous Glucose Sensor (DEXCOM G7 SENSOR) MISC 1 each every 10 days. Change sensor every 10 days  Patient not taking: Reported on 3/18/2025 12/9/24   Courtney Chaudhary APRN CNP   Continuous Glucose Sensor (FREESTYLE HANK 2 PLUS SENSOR) MISC 1 each every 14 days. 2/2/25   Courtney Chaudhary APRN CNP   empagliflozin (JARDIANCE) 25 MG TABS tablet Take 1 tablet (25 mg) by mouth daily 5/28/24   Amberly Whyte NP   insulin aspart (NOVOLOG VIAL) 100 UNITS/ML vial TO BE USED WITH INSULIN PUMP, MAX DAILY UNITS 70 2/24/25   Courtney Chaudhary APRN CNP   Insulin Disposable Pump (OMNIPOD 5 LIBRE2 PLUS G6 PODS) MISC 1 each every 72 hours. 2/2/25   Courtney Chaudhary APRN CNP   Insulin Disposable Pump (OMNIPOD 5 LIBRE2 PLUS G6) KIT 1 each every 72 hours. 2/2/25   Courtney Chaudhary APRN CNP   Insulin Disposable Pump (OMNIPOD DASH PODS, GEN 4,) MISC Inject 1 pod subcutaneously every 48 hours. 10/30/24   Courtney Chaudhary APRN CNP   insulin glargine U-300 (TOUJEO SOLOSTAR) 300 UNIT/ML (1 units dial) pen Inject 20 Units Subcutaneous At Bedtime  Patient taking differently: Inject 20 Units subcutaneously nightly as needed (pump failure). 9/28/22   Courtney Chaudhary APRN CNP   insulin pen needle (32G X 4 MM) 32G X 4 MM miscellaneous Use 1 pen needles daily 9/28/22   Courtney Chaudhary APRN CNP   INSULIN PUMP - OUTPATIENT Insulin pump  Omnipod Dash  Date: 1/23/25  Basal: 1.3 (new)  Total basal: 31.2   CR:15  ISF: 50  BG target: 120  BG correction: 130  Active insulin: 4 hours 2/2/25   Courtney Chaudhary, BLAIR CNP   ipratropium-albuterol (COMBIVENT RESPIMAT)  MCG/ACT inhaler INHALE 1 PUFF INTO THE LUNGS 4 TIMES  DAILY  Patient taking differently: Inhale 1 puff into the lungs 4 times daily as needed. 11/29/24   Amberly Whyte NP   ketoconazole (NIZORAL) 2 % external cream Apply topically daily  Patient taking differently: Apply topically daily as needed. 7/18/24   Courtney Chaudhary APRN CNP   lisinopril (ZESTRIL) 40 MG tablet TAKE 1 TABLET BY MOUTH DAILY 11/26/24   Amberly Whyte NP   OneTouch Delica Lancets 33G MISC Inject 1 each Subcutaneous 3 times daily (before meals) 4/20/22   Courtney Chaudhary APRN CNP   order for DME Women briefs 8/12/19   Amberly Whyte NP   primidone (MYSOLINE) 50 MG tablet TAKE 1 TABLET BY MOUTH AT BEDTIME 3/12/25   Amberly Whyte NP   tiotropium-olodaterol 2.5-2.5 MCG/ACT AERS Inhale 2 puffs into the lungs daily  Patient taking differently: Inhale 2 puffs into the lungs daily as needed. 1/9/24   Reported, Patient   triamcinolone (KENALOG) 0.1 % external ointment Apply topically 2 times daily as needed (foot irritation).    Reported, Patient              Review of Systems:   The Review of Systems is negative other than noted in the HPI            Physical Exam:   Patient Vitals for the past 24 hrs:   BP Temp Temp src Pulse Resp SpO2 Weight   04/01/25 1015 -- -- -- 79 19 (!) 90 % --   04/01/25 1000 -- -- -- 80 20 (!) 91 % --   04/01/25 0940 -- -- -- 79 18 96 % --   04/01/25 0935 -- -- -- 79 18 96 % --   04/01/25 0930 -- -- -- 81 19 94 % --   04/01/25 0925 -- -- -- 80 21 94 % --   04/01/25 0915 -- -- -- 78 20 95 % --   04/01/25 0901 -- -- -- 58 20 95 % --   04/01/25 0859 -- -- -- 72 20 94 % --   04/01/25 0853 -- -- -- 76 20 93 % --   04/01/25 0845 -- -- -- 75 20 95 % --   04/01/25 0840 -- -- -- 75 20 94 % --   04/01/25 0830 -- -- -- 74 21 92 % --   04/01/25 0800 -- 97.5  F (36.4  C) Oral 67 20 93 % --   04/01/25 0700 -- -- -- 69 20 93 % --   04/01/25 0630 -- -- -- 70 20 93 % --   04/01/25 0600 -- -- -- 71 20 93 % --   04/01/25 0530 -- -- -- 72 20 93 % --   04/01/25 0500 -- -- -- 73 30 93 % --    04/01/25 0430 -- -- -- 75 20 (!) 91 % 104.1 kg (229 lb 8 oz)   04/01/25 0403 -- -- -- 77 -- (!) 90 % --   04/01/25 0400 -- 97.8  F (36.6  C) Oral 77 20 (!) 90 % --   04/01/25 0330 -- -- -- 79 20 (!) 90 % --   04/01/25 0300 -- -- -- 79 20 (!) 90 % --   04/01/25 0230 -- -- -- 78 20 (!) 90 % --   04/01/25 0200 -- -- -- 70 20 92 % --   04/01/25 0159 -- -- -- 69 20 92 % --   04/01/25 0130 -- -- -- 69 20 93 % --   04/01/25 0100 -- -- -- 70 20 94 % --   04/01/25 0030 -- -- -- 72 20 94 % --   04/01/25 0004 -- -- -- 71 -- 94 % --   04/01/25 0000 -- 97.6  F (36.4  C) Oral 71 20 94 % --   03/31/25 2340 -- -- -- 71 (!) 31 93 % --   03/31/25 2330 -- -- -- 68 20 95 % --   03/31/25 2320 -- -- -- 66 22 94 % --   03/31/25 2310 -- -- -- 68 20 94 % --   03/31/25 2303 -- -- -- 71 22 92 % --   03/31/25 2300 -- -- -- 67 20 94 % --   03/31/25 2254 -- -- -- 71 20 95 % --   03/31/25 2250 -- -- -- 72 21 94 % --   03/31/25 2246 -- -- -- 69 -- 96 % --   03/31/25 2245 -- -- -- 70 20 96 % --   03/31/25 2240 -- -- -- 68 20 96 % --   03/31/25 2230 -- -- -- 68 20 96 % --   03/31/25 2220 -- -- -- 68 18 95 % --   03/31/25 2215 (!) 76/50 -- -- 65 20 94 % --   03/31/25 2210 -- -- -- 65 20 94 % --   03/31/25 2203 (!) 83/52 -- -- 66 16 96 % --   03/31/25 2200 -- -- -- 66 18 97 % --   03/31/25 2120 109/63 -- -- 70 -- 94 % 104.8 kg (231 lb 0.7 oz)          Intake/Output Summary (Last 24 hours) at 4/1/2025 1038  Last data filed at 4/1/2025 1000  Gross per 24 hour   Intake 1119.13 ml   Output 3270 ml   Net -2150.87 ml      Constitutional:   awake, alert, cooperative, no apparent distress, and appears stated age       Eyes:   PERRL, conjunctiva/corneas clear, EOM's intact; no scleral edema or icterus noted        ENT:   Normocephalic, without obvious abnormality, atraumatic, Lips, mucosa, and tongue normal        Lungs:   Normal respiratory effort, no accessory muscle use       Cardiovascular:   Regular rate and rhythm       Abdomen:   Obese, soft,  non-tender.       Musculoskeletal:   No obvious swelling, bruising or deformity       /GYN: Right mons pubis and labia erythematous and indurated. Penrose drain in place to right labia/perineum draining brown/purulent malodorous fluid. Landrum catheter in place.     Skin:   Skin color and texture normal for patient, no rashes or lesions              Data:         All imaging studies reviewed by me.    Results for orders placed or performed during the hospital encounter of 03/31/25 (from the past 24 hours)   Glucose by meter   Result Value Ref Range    GLUCOSE BY METER POCT 92 70 - 99 mg/dL   Blood gas arterial   Result Value Ref Range    pH Arterial 7.39 7.35 - 7.45    pCO2 Arterial 48 (H) 35 - 45 mm Hg    pO2 Arterial 72 (L) 80 - 105 mm Hg    FIO2 50     Bicarbonate Arterial 29 (H) 21 - 28 mmol/L    Base Excess/Deficit Arterial 3.1 (H) -3.0 - 3.0 mmol/L    Oxyhemoglobin Arterial 93 92 - 100 %    O2 Sat, Arterial 94.1 (L) 96.0 - 97.0 %    Peep 8 cm H2O    Narrative    In healthy individuals, oxyhemoglobin (O2Hb) and oxygen saturation (SO2) are approximately equal. In the presence of dyshemoglobins, oxyhemoglobin can be considerably lower than oxygen saturation.   CBC with platelets   Result Value Ref Range    WBC Count 10.7 4.0 - 11.0 10e3/uL    RBC Count 4.03 3.80 - 5.20 10e6/uL    Hemoglobin 12.0 11.7 - 15.7 g/dL    Hematocrit 36.7 35.0 - 47.0 %    MCV 91 78 - 100 fL    MCH 29.8 26.5 - 33.0 pg    MCHC 32.7 31.5 - 36.5 g/dL    RDW 14.6 10.0 - 15.0 %    Platelet Count 283 150 - 450 10e3/uL   Comprehensive metabolic panel   Result Value Ref Range    Sodium 139 135 - 145 mmol/L    Potassium 4.4 3.4 - 5.3 mmol/L    Carbon Dioxide (CO2) 28 22 - 29 mmol/L    Anion Gap 7 7 - 15 mmol/L    Urea Nitrogen 44.1 (H) 8.0 - 23.0 mg/dL    Creatinine 1.84 (H) 0.51 - 0.95 mg/dL    GFR Estimate 31 (L) >60 mL/min/1.73m2    Calcium 8.2 (L) 8.8 - 10.4 mg/dL    Chloride 104 98 - 107 mmol/L    Glucose 90 70 - 99 mg/dL    Alkaline  Phosphatase 62 40 - 150 U/L    AST 25 0 - 45 U/L    ALT 16 0 - 50 U/L    Protein Total 4.9 (L) 6.4 - 8.3 g/dL    Albumin 1.8 (L) 3.5 - 5.2 g/dL    Bilirubin Total 0.3 <=1.2 mg/dL   Magnesium   Result Value Ref Range    Magnesium 1.9 1.7 - 2.3 mg/dL   Phosphorus   Result Value Ref Range    Phosphorus 5.6 (H) 2.5 - 4.5 mg/dL   Ionized Calcium   Result Value Ref Range    Calcium Ionized Whole Blood 4.7 4.4 - 5.2 mg/dL   Lactic acid whole blood   Result Value Ref Range    Lactic Acid 1.0 0.7 - 2.0 mmol/L   INR   Result Value Ref Range    INR 1.24 (H) 0.85 - 1.15   Procalcitonin   Result Value Ref Range    Procalcitonin 0.34 <0.50 ng/mL   Heparin Unfractionated Anti Xa Level   Result Value Ref Range    Anti Xa Unfractionated Heparin 0.36 For Reference Range, See Comment IU/mL    Narrative    Therapeutic Range: UFH: 0.25-0.50 IU/mL for low intensity dosing,  0.30-0.70 IU/mL for high intensity dosing DVT and PE.  This test is not validated for other direct factor X inhibitors (e.g. rivaroxaban, apixaban, edoxaban, betrixaban, fondaparinux) and should not be used for monitoring of other medications.   Glucose by meter   Result Value Ref Range    GLUCOSE BY METER POCT 101 (H) 70 - 99 mg/dL   Cell count with differential fluid - BAL Site 1    Narrative    The following orders were created for panel order Cell count with differential fluid - BAL Site 1.  Procedure                               Abnormality         Status                     ---------                               -----------         ------                     Cell Count Body Fluid[[id number]]       Abnormal            Final result               Differential Body Fluid[[id number]]                         In process                   Please view results for these tests on the individual orders.   Gram stain - BAL Site 1    Specimen: Lung, Middle Lobe, Right; Bronchial Alveolar Lavage   Result Value Ref Range    GS Culture See corresponding culture for results      Gram Stain Result <25 PMNs/low power field     Gram Stain Result No organisms seen    Acid-Fast Bacilli Culture and Stain    Specimen: Lung, Middle Lobe, Right; Bronchial Alveolar Lavage    Narrative    The following orders were created for panel order Acid-Fast Bacilli Culture and Stain.  Procedure                               Abnormality         Status                     ---------                               -----------         ------                     Acid-Fast Bacilli Cultu...[[id number]]                      In process                   Please view results for these tests on the individual orders.   Koh prep - BAL Site 1    Specimen: Lung, Middle Lobe, Right; Bronchial Alveolar Lavage   Result Value Ref Range    KOH Preparation No fungal elements seen     KOH Preparation Reference Range: No fungal elements seen.    Legionella culture - BAL Site 1    Specimen: Lung, Middle Lobe, Right; Bronchial Alveolar Lavage   Result Value Ref Range    Culture Culture negative, monitoring continues    Cell Count Body Fluid   Result Value Ref Range    Color Colorless Colorless, Yellow    Clarity Cloudy (A) Clear    Cell Count Fluid Source Lung, Middle Lobe, Right     Total Nucleated Cells 49 /uL    Narrative    No reference ranges have been established.  This result  should be interpreted in the context of the patient's clinical condition and   compared to simultaneous measurement in the patient's blood.         Fractional excretion of sodium    Narrative    The following orders were created for panel order Fractional excretion of sodium.  Procedure                               Abnormality         Status                     ---------                               -----------         ------                     Fractional Excretion of...[[id number]]                      Final result                 Please view results for these tests on the individual orders.   Urea nitrogen random urine with Creat Ratio   Result Value  Ref Range    Urea Nitrogen Urine mg/dL 329.0 (L) 801.0 - 1,666.0 mg/dL   UA Macroscopic with reflex to Microscopic and Culture    Specimen: Urine, Catheter   Result Value Ref Range    Color Urine Light Yellow Colorless, Straw, Light Yellow, Yellow    Appearance Urine Turbid (A) Clear    Glucose Urine 70 (A) Negative mg/dL    Bilirubin Urine Negative Negative    Ketones Urine Negative Negative mg/dL    Specific Gravity Urine 1.013 1.001 - 1.030    Blood Urine 0.2 mg/dL (A) Negative    pH Urine 5.0 5.0 - 7.0    Protein Albumin Urine Negative Negative mg/dL    Urobilinogen Urine Normal Normal mg/dL    Nitrite Urine Negative Negative    Leukocyte Esterase Urine 25 Santy/uL (A) Negative    RBC Urine 36 (H) <=2 /HPF    WBC Urine 3 <=5 /HPF    Squamous Epithelials Urine 10 (H) <=1 /HPF    Narrative    Urine Culture not indicated   Legionella Urinary Antigen and Streptococcus pneumoniae antigen    Specimen: Urine, Landrum Catheter   Result Value Ref Range    Legionella pneumophila serogroup 1 urinary antigen Negative Negative    Streptococcus pneumoniae antigen Negative Negative    Legionella pneumophila Urinary/Strep pneumoniae Antigen Specimen Type Urine     Narrative    The result of this test as well as culture, serology, or other antigen detection methods should be used in conjunction with clinical findings to make an accurate diagnosis.   Fractional Excretion of Sodium   Result Value Ref Range    Creatinine Urine mg/dL 33.6 mg/dL    Sodium Urine mmol/L 86 mmol/L    %FENA 3.4 %   XR Chest Port 1 View    Narrative    EXAM: XR CHEST PORT 1 VIEW  LOCATION: [facility]  DATE: 3/31/2025    INDICATION: Endotracheal tube positioning.  COMPARISON: Portable chest single view 3/31/2025 at 0608 hours.      Impression    IMPRESSION: Endotracheal tube tip 6.5 cm above the carlos. Enteric tube tip below the diaphragm. Right IJ catheter tip in the SVC. Improved bilateral pleural effusions and associated passive  atelectasis in the adjacent lungs. Normal cardiac size and   pulmonary vascularity. Atherosclerotic thoracic aorta. Distal right rotator cuff calcific arthropathy. Monitoring leads overlying the chest.   XR Abdomen Port 1 View    Narrative    EXAM: XR ABDOMEN PORT 1 VIEW  LOCATION: [facility]  DATE: 3/31/2025    INDICATION: Check NG OG tube placement  COMPARISON: None.      Impression    IMPRESSION: Orogastric tube tip in the stomach. Mild gaseous distention of the transverse colon. No abnormal calcification. Hazy opacity in the imaged lower lungs compatible with pleural fluid and atelectasis.   CT Chest w/o Contrast    Narrative    EXAM: CT CHEST W/O CONTRAST  LOCATION: [facility]  DATE: 3/31/2025    INDICATION: VAP w  multilobar collapse, assess aeration post bronchoscopy  COMPARISON: 3/24/2025.  TECHNIQUE: CT chest without IV contrast. Multiplanar reformats were obtained. Dose reduction techniques were used.  CONTRAST: None.    FINDINGS:   LUNGS AND PLEURA: There are small bilateral pleural effusions layering dependently. There is complete right lower and near complete left lower lobe atelectasis. There is mild groundglass density in the right middle lobe. Mild emphysema. Endotracheal tube   6 cm above the carlos. No pneumothorax.    MEDIASTINUM/AXILLAE: Thoracic aorta normal in caliber. Pulmonary arteries normal in caliber. No abnormally enlarged lymph nodes. Right IJ line terminates in the mid SVC. Gastric tube in the esophagus terminating in the stomach. No pericardial effusion.    CORONARY ARTERY CALCIFICATION: Severe.    UPPER ABDOMEN: Normal.    MUSCULOSKELETAL: Normal.      Impression    IMPRESSION:   1.  Complete right and near complete left lower lobe collapse.  2.  Mild groundglass density in the right middle lobe, nonspecific.     Blood gas arterial   Result Value Ref Range    pH Arterial 7.42 7.35 - 7.45    pCO2 Arterial 42 35 - 45 mm Hg    pO2  Arterial 68 (L) 80 - 105 mm Hg    FIO2 50     Bicarbonate Arterial 28 21 - 28 mmol/L    Base Excess/Deficit Arterial 2.8 -3.0 - 3.0 mmol/L    Oxyhemoglobin Arterial 93 92 - 100 %    O2 Sat, Arterial 93.6 (L) 96.0 - 97.0 %    Peep 8 cm H2O    Narrative    In healthy individuals, oxyhemoglobin (O2Hb) and oxygen saturation (SO2) are approximately equal. In the presence of dyshemoglobins, oxyhemoglobin can be considerably lower than oxygen saturation.   Glucose by meter   Result Value Ref Range    GLUCOSE BY METER POCT 129 (H) 70 - 99 mg/dL   Glucose by meter   Result Value Ref Range    GLUCOSE BY METER POCT 164 (H) 70 - 99 mg/dL   Glucose by meter   Result Value Ref Range    GLUCOSE BY METER POCT 177 (H) 70 - 99 mg/dL   Heparin Unfractionated Anti Xa Level   Result Value Ref Range    Anti Xa Unfractionated Heparin 0.51 For Reference Range, See Comment IU/mL    Narrative    Therapeutic Range: UFH: 0.25-0.50 IU/mL for low intensity dosing,  0.30-0.70 IU/mL for high intensity dosing DVT and PE.  This test is not validated for other direct factor X inhibitors (e.g. rivaroxaban, apixaban, edoxaban, betrixaban, fondaparinux) and should not be used for monitoring of other medications.   Basic metabolic panel   Result Value Ref Range    Sodium 139 135 - 145 mmol/L    Potassium 4.3 3.4 - 5.3 mmol/L    Chloride 103 98 - 107 mmol/L    Carbon Dioxide (CO2) 26 22 - 29 mmol/L    Anion Gap 10 7 - 15 mmol/L    Urea Nitrogen 45.8 (H) 8.0 - 23.0 mg/dL    Creatinine 1.73 (H) 0.51 - 0.95 mg/dL    GFR Estimate 33 (L) >60 mL/min/1.73m2    Calcium 8.3 (L) 8.8 - 10.4 mg/dL    Glucose 173 (H) 70 - 99 mg/dL   CBC with platelets   Result Value Ref Range    WBC Count 11.8 (H) 4.0 - 11.0 10e3/uL    RBC Count 4.07 3.80 - 5.20 10e6/uL    Hemoglobin 12.2 11.7 - 15.7 g/dL    Hematocrit 36.8 35.0 - 47.0 %    MCV 90 78 - 100 fL    MCH 30.0 26.5 - 33.0 pg    MCHC 33.2 31.5 - 36.5 g/dL    RDW 14.3 10.0 - 15.0 %    Platelet Count 286 150 - 450 10e3/uL    Magnesium   Result Value Ref Range    Magnesium 2.3 1.7 - 2.3 mg/dL   Blood gas arterial   Result Value Ref Range    pH Arterial 7.44 7.35 - 7.45    pCO2 Arterial 39 35 - 45 mm Hg    pO2 Arterial 63 (L) 80 - 105 mm Hg    FIO2 50     Bicarbonate Arterial 27 21 - 28 mmol/L    Base Excess/Deficit Arterial 2.3 -3.0 - 3.0 mmol/L    Oxyhemoglobin Arterial 92 92 - 100 %    O2 Sat, Arterial 92.8 (L) 96.0 - 97.0 %    Peep 10 cm H2O    Narrative    In healthy individuals, oxyhemoglobin (O2Hb) and oxygen saturation (SO2) are approximately equal. In the presence of dyshemoglobins, oxyhemoglobin can be considerably lower than oxygen saturation.   Phosphorus   Result Value Ref Range    Phosphorus 4.9 (H) 2.5 - 4.5 mg/dL   Glucose by meter   Result Value Ref Range    GLUCOSE BY METER POCT 168 (H) 70 - 99 mg/dL   Glucose by meter   Result Value Ref Range    GLUCOSE BY METER POCT 154 (H) 70 - 99 mg/dL   Glucose by meter   Result Value Ref Range    GLUCOSE BY METER POCT 124 (H) 70 - 99 mg/dL   Glucose by meter   Result Value Ref Range    GLUCOSE BY METER POCT 139 (H) 70 - 99 mg/dL   Glucose by meter   Result Value Ref Range    GLUCOSE BY METER POCT 142 (H) 70 - 99 mg/dL           ANNETTE Carvalho [facility]  Surgery [address] [address]?  Office: [phone]

## 2025-04-01 NOTE — PROGRESS NOTES
[facility] - ICU Admission Note       Dual Skin Assessment:     Patient transferred from External Facility to ICU.      Comprehensive skin inspection completed by myself and Juli Ramos RN.      Abnormal skin assessment findings: Yes      If yes above, LDA initiated for skin breakdown/non-blanchable erythema:  Yes     Provider notified: Yes notified upon transfer.     WOC consult order obtained: Yes ordered on admission.

## 2025-04-01 NOTE — CONSULTS
CLINICAL NUTRITION SERVICES - ASSESSMENT NOTE    RECOMMENDATIONS FOR MDs/PROVIDERS TO ORDER:      Registered Dietitian Interventions:  Start Jevity 1.5 at 25 ml/hr increasing 10 ml every 4 hrs to goal rate 45 ml/hr continuous.    Future/Additional Recommendations:  TF tolerance.     REASON FOR ASSESSMENT  Provider order - Registered Dietitian to order TF per Medical Nutrition Therapy Guidelines    INFORMATION OBTAINED  Assessed patient in room.  Patient intubated and sedated.    Patient presented to OSH with shortness of breath and DKA found to have PE.   Patient intubated 3/22 for respiratory failure.  Patient transferred to [facility] for possible tracheostomy.  History of DM, COPD, intellectual disability with memory loss, tobacco use, CKD.   Discussed patient in team rounds.        NUTRITION HISTORY  Patient admitted to Hospital in [place], was intubated and started TF 3/25/25.  Patient started on Osmolite 1.5, was tolerating continuous feedings.    Spoke with patient's significant other by phone.  Prior to hospitalization patient was eating a regular diet.  She would eat cereal with milk and frozen TV dinners.  Patient was drinking regular soda, her significant other was trying to get her to change to diet soda.      CURRENT NUTRITION ORDERS  Diet: NPO    Nutrition-relevant labs:   CMP  Recent Labs   Lab 04/01/25  0902 04/01/25  0803 04/01/25  0641 04/01/25  0535 04/01/25  0418 03/31/25  2209 03/31/25  2206 03/31/25  2127 03/31/25  1735 03/31/25  0501 03/31/25  0449 03/30/25  0640 03/30/25  0445   NA  --   --   --   --  139  --  139  --  139  --  140  --  142   POTASSIUM  --   --   --   --  4.3  --  4.4  --  4.1  --  4.1  --  4.2   * 124* 154* 168* 173*   < > 90   < > 128*   < > 150*   < > 138*   BUN  --   --   --   --  45.8*  --  44.1*  --  46.7*  --  52.9*  --  61.6*   CR  --   --   --   --  1.73*  --  1.84*  --  1.84*  --  1.96*  --  2.09*   NORM  --   --   --   --  8.3*  --  8.2*  --  7.8*  --  8.2*   --  8.0*   MAG  --   --   --   --  2.3  --  1.9  --  1.7  --  1.8  --  1.9   PHOS  --   --   --   --  4.9*  --  5.6*  --   --   --   --   --   --    ALBUMIN  --   --   --   --   --   --  1.8*  --  1.7*  --  1.8*  --  1.7*   BILITOTAL  --   --   --   --   --   --  0.3  --  0.3  --  0.4  --  0.4   ALKPHOS  --   --   --   --   --   --  62  --  59  --  67  --  74   AST  --   --   --   --   --   --  25  --  20  --  22  --  24   ALT  --   --   --   --   --   --  16  --  14  --  15  --  17    < > = values in this interval not displayed.     Reviewed    MEDICATIONS  Nutrition-relevant medications:   Current Facility-Administered Medications   Medication Dose Route Frequency Provider Last Rate Last Admin    albuterol (PROVENTIL) neb solution 2.5 mg  2.5 mg Nebulization Q4H Nathaniel Coreas MD        And    sodium chloride (NEBUSAL) 3 % neb solution 3 mL  3 mL Nebulization Q8H Nathaniel Coreas MD        chlorhexidine (PERIDEX) 0.12 % solution 15 mL  15 mL Mouth/Throat Q12H Anjali Segura MD   15 mL at 04/01/25 0757    pantoprazole (PROTONIX) 2 mg/mL suspension 40 mg  40 mg Per Feeding Tube QA Anjali Morales MD        Or    pantoprazole (PROTONIX) IV push injection 40 mg  40 mg Intravenous QA Anjali Morales MD   40 mg at 04/01/25 0644    piperacillin-tazobactam (ZOSYN) 3.375 g vial to attach to  mL bag  3.375 g Intravenous Q8H Anjali Segura MD   3.375 g at 04/01/25 0431    vancomycin (VANCOCIN) 1,000 mg in 200 mL dextrose intermittent infusion  1,000 mg Intravenous Q24H Anjali Segura MD          Current Facility-Administered Medications   Medication Dose Route Frequency Provider Last Rate Last Admin    dexmedeTOMIDine (PRECEDEX) 4 mcg/mL in sodium chloride 0.9 % 100 mL infusion  0.1-1.2 mcg/kg/hr Intravenous Continuous Anjali Segura MD   Stopped at 04/01/25 0925    dextrose 10% infusion   Intravenous Continuous PRN Anjali Segura MD        fentaNYL (SUBLIMAZE)  infusion   mcg/hr Intravenous Continuous Anjali Segura MD   Stopped at 04/01/25 0901    heparin 25,000 units in 0.45% NaCl 250 mL ANTICOAGULANT infusion  0-5,000 Units/hr Intravenous Continuous Anjali Segura MD 14.5 mL/hr at 04/01/25 0800 1,450 Units/hr at 04/01/25 0800    insulin regular (MYXREDLIN) 1 unit/mL infusion  0-24 Units/hr Intravenous Continuous Anjali Segura MD 2 mL/hr at 04/01/25 0902 2 Units/hr at 04/01/25 0902    norepinephrine (LEVOPHED) 4 mg in  mL infusion PREMIX  0.01-0.6 mcg/kg/min (Dosing Weight) Intravenous Continuous Anjali Segura MD 3.9 mL/hr at 04/01/25 0930 0.01 mcg/kg/min at 04/01/25 0930    Patient is already receiving anticoagulation with heparin, enoxaparin (LOVENOX), warfarin (COUMADIN)  or other anticoagulant medication   Does not apply Continuous PRN Anjali Segura MD          Current Facility-Administered Medications   Medication Dose Route Frequency Provider Last Rate Last Admin    albuterol (PROVENTIL) neb solution 2.5 mg  2.5 mg Nebulization Q2H PRAnjali Burns MD        bisacodyl (DULCOLAX) suppository 10 mg  10 mg Rectal Daily PRN Anjali Segura MD        dextrose 10% infusion   Intravenous Continuous PRAnjali Burns MD        glucose gel 15-30 g  15-30 g Oral Q15 Min PRAnjali Burns MD        Or    dextrose 50 % injection 25-50 mL  25-50 mL Intravenous Q15 Min PRAnjali Burns MD        Or    glucagon injection 1 mg  1 mg Subcutaneous Q15 Min PRAnjali Burns MD        glucose gel 15-30 g  15-30 g Oral Q15 Min PRAnjali Burns MD        Or    dextrose 50 % injection 25-50 mL  25-50 mL Intravenous Q15 Min PRAnjali Burns MD        Or    glucagon injection 1 mg  1 mg Subcutaneous Q15 Min PRN Anjali Segura MD        fentaNYL (PF) (SUBLIMAZE) injection 100 mcg  100 mcg Intravenous ONCE PRN REPEAT PER INSTRUCTIONS Anjali Segura MD   100 mcg at  "03/31/25 2220    fentaNYL (SUBLIMAZE) 50 mcg/mL bolus from pump  25-50 mcg Intravenous Q1H PRN Anjali Segura MD   50 mcg at 04/01/25 0800    lidocaine (XYLOCAINE) 2 % external gel   Urethral Once PRN Anjali Segura MD        naloxone (NARCAN) injection 0.2 mg  0.2 mg Intravenous Q2 Min PRN Nathaniel Coreas MD        Or    naloxone (NARCAN) injection 0.4 mg  0.4 mg Intravenous Q2 Min PRN Nathaniel Coreas MD        Or    naloxone (NARCAN) injection 0.2 mg  0.2 mg Intramuscular Q2 Min PRN Nathaniel Coreas MD        Or    naloxone (NARCAN) injection 0.4 mg  0.4 mg Intramuscular Q2 Min PRN Nathaniel Coreas MD        ondansetron (ZOFRAN ODT) ODT tab 4 mg  4 mg Oral Q6H PRN Anjali Segura MD        Or    ondansetron (ZOFRAN) injection 4 mg  4 mg Intravenous Q6H PRN Anjali Segura MD        Patient is already receiving anticoagulation with heparin, enoxaparin (LOVENOX), warfarin (COUMADIN)  or other anticoagulant medication   Does not apply Continuous PRN Anjali Segura MD        polyethylene glycol (MIRALAX) Packet 17 g  17 g Oral Daily PRN Anjali Segura MD        prochlorperazine (COMPAZINE) injection 10 mg  10 mg Intravenous Q6H PRAnjali Burns MD        Or    prochlorperazine (COMPAZINE) tablet 10 mg  10 mg Oral Q6H PRAnjali Burns MD        senna-docusate (SENOKOT-S/PERICOLACE) 8.6-50 MG per tablet 1 tablet  1 tablet Oral BID PRAnjali Burns MD        Or    senna-docusate (SENOKOT-S/PERICOLACE) 8.6-50 MG per tablet 2 tablet  2 tablet Oral BID PRAnjali Burns MD          Reviewed    ANTHROPOMETRICS  Height: 0 cm (Data Unavailable) 5' 5\"  Admission Weight: 104.8 kg (231 lb 0.7 oz) (03/31/25 2120)   Most Recent Weight: 104.1 kg (229 lb 8 oz) (04/01/25 0430)  IBW: 56.8 kg  BMI: Body mass index is 38.19 kg/m .   Weight History:   03/31/25 : 109.4 kg (241 lb 2.9 oz)   03/26/25 : 102.1 kg (225 lb 1.4 oz)   03/22/25 : 90.4 kg (199 lb 4.7 oz) -admit " at OSH.  03/18/25 : 88.1 kg (194 lb 3.6 oz)   03/17/25 : 86 kg (189 lb 11.2 oz)   01/23/25 : 92.5 kg (204 lb)   12/05/24 : 92.9 kg (204 lb 12.5 oz)   11/25/24 : 91.4 kg (201 lb 8 oz)   10/30/24 : 91.4 kg (201 lb 9.6 oz)   08/01/24 : 90.4 kg (199 lb 4.7 oz)   07/18/24 : 90.4 kg (199 lb 4.8 oz)     Dosing Weight: 90.4 kg, based on admission wt to OSH 3/22-energy.  56.8 kg, ideal, for protein    ASSESSED NUTRITION NEEDS  Estimated Energy Needs: 1569 kcals/day ([facility] 2010)  Justification: Obese and Vented  Estimated Protein Needs: 68-85 grams protein/day (1.2 - 1.5 grams of pro/kg)  Justification: Hypercatabolism with acute illness and Wound healing, YADIRA  Estimated Fluid Needs: 1569+ mL/day (1 mL/kcal)  Justification: Maintenance    SYSTEM AND PHYSICAL FINDINGS    Per chart  GI symptoms: Last BM 3/30  Skin/wounds:   2+ generalized edema per chart  Sacral PI, groin wound and perineal with scabs/blisters.    Vent, OG.  OG currently to LIS with 200 ml out.     MALNUTRITION  % Intake: Decreased intake does not meet criteria, 3-4 days of NPO.  % Weight Loss: None noted  Subcutaneous Fat Loss: None observed  Muscle Loss: None observed  Fluid Accumulation/Edema: Mild, 1+ to moderate.  Malnutrition Diagnosis: Patient does not meet two of the established criteria necessary for diagnosing malnutrition    NUTRITION DIAGNOSIS  Swallowing difficulty related to acute illness as evidenced by intubation and need for nutrition support    INTERVENTIONS  Enteral nutrition management  Start Jevity 1.5 at 25 ml/hr increasing 10 ml every 4 hrs to goal rate 45 ml/hr continuous.  Jevity 1.5 Saqib @ goal of  45ml/hr  (1080ml/day) provides: 1620 kcals, 69 g PRO, 820 ml free H20, 233 g CHO, and 22 g fiber daily.     Free water flush 135 ml every 4 hrs = 810 ml/day.    TF at goal rate meets estimated needs.     GOALS  Tolerate TF at goal rate  Meet nutrition needs  BG < 180  Electrolytes WNL     MONITORING/EVALUATION  Progress toward goals will  be monitored and evaluated per policy.

## 2025-04-01 NOTE — PROGRESS NOTES
Patient extubated at 1250 to [facility] NC with Holly RT. Patient A/Ox3, but thought she was at [facility], which is understandable under the situation.

## 2025-04-02 ENCOUNTER — ANESTHESIA EVENT (OUTPATIENT)
Dept: SURGERY | Facility: HOSPITAL | Age: 60
End: 2025-04-02
Payer: COMMERCIAL

## 2025-04-02 ENCOUNTER — ANESTHESIA (OUTPATIENT)
Dept: SURGERY | Facility: HOSPITAL | Age: 60
End: 2025-04-02
Payer: COMMERCIAL

## 2025-04-02 ENCOUNTER — APPOINTMENT (OUTPATIENT)
Dept: RADIOLOGY | Facility: HOSPITAL | Age: 60
DRG: 981 | End: 2025-04-02
Attending: INTERNAL MEDICINE
Payer: COMMERCIAL

## 2025-04-02 LAB
1,3 BETA GLUCAN SER-MCNC: <31 PG/ML
ACID FAST STAIN (ARUP): NORMAL
ACID FAST STAIN (ARUP): NORMAL
ANION GAP SERPL CALCULATED.3IONS-SCNC: 9 MMOL/L (ref 7–15)
BASOPHILS # BLD AUTO: 0 10E3/UL (ref 0–0.2)
BASOPHILS NFR BLD AUTO: 0 %
BUN SERPL-MCNC: 32.5 MG/DL (ref 8–23)
CALCIUM SERPL-MCNC: 8.6 MG/DL (ref 8.8–10.4)
CHLORIDE SERPL-SCNC: 101 MMOL/L (ref 98–107)
CREAT SERPL-MCNC: 1.58 MG/DL (ref 0.51–0.95)
CRP SERPL-MCNC: 167.5 MG/L
EGFRCR SERPLBLD CKD-EPI 2021: 37 ML/MIN/1.73M2
EOSINOPHIL # BLD AUTO: 0.1 10E3/UL (ref 0–0.7)
EOSINOPHIL NFR BLD AUTO: 1 %
ERYTHROCYTE [DISTWIDTH] IN BLOOD BY AUTOMATED COUNT: 14.4 % (ref 10–15)
GALACTOMANNAN AG BAL QL: NEGATIVE
GALACTOMANNAN AG SERPL QL IA: NEGATIVE
GALACTOMANNAN AG SPEC IA-ACNC: 0.02
GALACTOMANNAN AG SPEC IA-ACNC: 0.04
GLUCOSE BLDC GLUCOMTR-MCNC: 104 MG/DL (ref 70–99)
GLUCOSE BLDC GLUCOMTR-MCNC: 105 MG/DL (ref 70–99)
GLUCOSE BLDC GLUCOMTR-MCNC: 106 MG/DL (ref 70–99)
GLUCOSE BLDC GLUCOMTR-MCNC: 107 MG/DL (ref 70–99)
GLUCOSE BLDC GLUCOMTR-MCNC: 114 MG/DL (ref 70–99)
GLUCOSE BLDC GLUCOMTR-MCNC: 114 MG/DL (ref 70–99)
GLUCOSE BLDC GLUCOMTR-MCNC: 117 MG/DL (ref 70–99)
GLUCOSE BLDC GLUCOMTR-MCNC: 118 MG/DL (ref 70–99)
GLUCOSE BLDC GLUCOMTR-MCNC: 97 MG/DL (ref 70–99)
GLUCOSE SERPL-MCNC: 118 MG/DL (ref 70–99)
HCO3 SERPL-SCNC: 29 MMOL/L (ref 22–29)
HCT VFR BLD AUTO: 36.6 % (ref 35–47)
HGB BLD-MCNC: 12.3 G/DL (ref 11.7–15.7)
IMM GRANULOCYTES # BLD: 0.1 10E3/UL
IMM GRANULOCYTES NFR BLD: 1 %
LYMPHOCYTES # BLD AUTO: 0.9 10E3/UL (ref 0.8–5.3)
LYMPHOCYTES NFR BLD AUTO: 8 %
MAGNESIUM SERPL-MCNC: 1.9 MG/DL (ref 1.7–2.3)
MCH RBC QN AUTO: 30.7 PG (ref 26.5–33)
MCHC RBC AUTO-ENTMCNC: 33.6 G/DL (ref 31.5–36.5)
MCV RBC AUTO: 91 FL (ref 78–100)
MONOCYTES # BLD AUTO: 0.7 10E3/UL (ref 0–1.3)
MONOCYTES NFR BLD AUTO: 6 %
NEUTROPHILS # BLD AUTO: 10 10E3/UL (ref 1.6–8.3)
NEUTROPHILS NFR BLD AUTO: 85 %
NRBC # BLD AUTO: 0 10E3/UL
NRBC BLD AUTO-RTO: 0 /100
OBSERVATION IMP: NEGATIVE
PHOSPHATE SERPL-MCNC: 4.3 MG/DL (ref 2.5–4.5)
PLATELET # BLD AUTO: 372 10E3/UL (ref 150–450)
POTASSIUM SERPL-SCNC: 3.9 MMOL/L (ref 3.4–5.3)
PROCALCITONIN SERPL IA-MCNC: 0.31 NG/ML
RBC # BLD AUTO: 4.01 10E6/UL (ref 3.8–5.2)
SODIUM SERPL-SCNC: 139 MMOL/L (ref 135–145)
UFH PPP CHRO-ACNC: 0.39 IU/ML
WBC # BLD AUTO: 11.8 10E3/UL (ref 4–11)
WBC # BLD AUTO: 11.8 10E3/UL (ref 4–11)

## 2025-04-02 PROCEDURE — 80048 BASIC METABOLIC PNL TOTAL CA: CPT | Performed by: INTERNAL MEDICINE

## 2025-04-02 PROCEDURE — 272N000001 HC OR GENERAL SUPPLY STERILE: Performed by: SURGERY

## 2025-04-02 PROCEDURE — 258N000003 HC RX IP 258 OP 636: Performed by: NURSE ANESTHETIST, CERTIFIED REGISTERED

## 2025-04-02 PROCEDURE — 83735 ASSAY OF MAGNESIUM: CPT | Performed by: INTERNAL MEDICINE

## 2025-04-02 PROCEDURE — 999N000287 HC ICU ADULT ROUNDING, EACH 10 MINS

## 2025-04-02 PROCEDURE — 84100 ASSAY OF PHOSPHORUS: CPT | Performed by: INTERNAL MEDICINE

## 2025-04-02 PROCEDURE — 86140 C-REACTIVE PROTEIN: CPT | Performed by: INTERNAL MEDICINE

## 2025-04-02 PROCEDURE — 200N000001 HC R&B ICU

## 2025-04-02 PROCEDURE — 272N000064 HC CIRCUIT HUMIDITY W/CPAP BIPAP

## 2025-04-02 PROCEDURE — P9047 ALBUMIN (HUMAN), 25%, 50ML: HCPCS | Mod: JZ | Performed by: INTERNAL MEDICINE

## 2025-04-02 PROCEDURE — 85025 COMPLETE CBC W/AUTO DIFF WBC: CPT | Performed by: INTERNAL MEDICINE

## 2025-04-02 PROCEDURE — 360N000075 HC SURGERY LEVEL 2, PER MIN: Performed by: SURGERY

## 2025-04-02 PROCEDURE — 370N000017 HC ANESTHESIA TECHNICAL FEE, PER MIN: Performed by: SURGERY

## 2025-04-02 PROCEDURE — 250N000009 HC RX 250: Performed by: INTERNAL MEDICINE

## 2025-04-02 PROCEDURE — 82565 ASSAY OF CREATININE: CPT | Performed by: INTERNAL MEDICINE

## 2025-04-02 PROCEDURE — 99255 IP/OBS CONSLTJ NEW/EST HI 80: CPT | Performed by: INTERNAL MEDICINE

## 2025-04-02 PROCEDURE — 85520 HEPARIN ASSAY: CPT | Performed by: INTERNAL MEDICINE

## 2025-04-02 PROCEDURE — 999N000157 HC STATISTIC RCP TIME EA 10 MIN

## 2025-04-02 PROCEDURE — 82310 ASSAY OF CALCIUM: CPT | Performed by: INTERNAL MEDICINE

## 2025-04-02 PROCEDURE — 250N000012 HC RX MED GY IP 250 OP 636 PS 637: Performed by: INTERNAL MEDICINE

## 2025-04-02 PROCEDURE — 999N000141 HC STATISTIC PRE-PROCEDURE NURSING ASSESSMENT: Performed by: SURGERY

## 2025-04-02 PROCEDURE — 84145 PROCALCITONIN (PCT): CPT | Performed by: INTERNAL MEDICINE

## 2025-04-02 PROCEDURE — 94640 AIRWAY INHALATION TREATMENT: CPT | Mod: 76

## 2025-04-02 PROCEDURE — 0W9N0ZZ DRAINAGE OF FEMALE PERINEUM, OPEN APPROACH: ICD-10-PCS | Performed by: SURGERY

## 2025-04-02 PROCEDURE — 85014 HEMATOCRIT: CPT | Performed by: INTERNAL MEDICINE

## 2025-04-02 PROCEDURE — 99207 PR NO BILLABLE SERVICE THIS VISIT: CPT | Performed by: PHYSICIAN ASSISTANT

## 2025-04-02 PROCEDURE — 250N000009 HC RX 250: Performed by: SURGERY

## 2025-04-02 PROCEDURE — 11006 DBRDMT SKIN XTRNL GENT PER: CPT | Performed by: SURGERY

## 2025-04-02 PROCEDURE — 250N000011 HC RX IP 250 OP 636: Performed by: STUDENT IN AN ORGANIZED HEALTH CARE EDUCATION/TRAINING PROGRAM

## 2025-04-02 PROCEDURE — 99292 CRITICAL CARE ADDL 30 MIN: CPT | Performed by: INTERNAL MEDICINE

## 2025-04-02 PROCEDURE — 250N000011 HC RX IP 250 OP 636: Mod: JZ | Performed by: NURSE ANESTHETIST, CERTIFIED REGISTERED

## 2025-04-02 PROCEDURE — 71045 X-RAY EXAM CHEST 1 VIEW: CPT

## 2025-04-02 PROCEDURE — 250N000013 HC RX MED GY IP 250 OP 250 PS 637: Performed by: INTERNAL MEDICINE

## 2025-04-02 PROCEDURE — 250N000009 HC RX 250: Performed by: STUDENT IN AN ORGANIZED HEALTH CARE EDUCATION/TRAINING PROGRAM

## 2025-04-02 PROCEDURE — 250N000011 HC RX IP 250 OP 636: Performed by: INTERNAL MEDICINE

## 2025-04-02 PROCEDURE — 250N000011 HC RX IP 250 OP 636: Performed by: PHYSICIAN ASSISTANT

## 2025-04-02 PROCEDURE — 99291 CRITICAL CARE FIRST HOUR: CPT | Performed by: INTERNAL MEDICINE

## 2025-04-02 PROCEDURE — 250N000025 HC SEVOFLURANE, PER MIN: Performed by: SURGERY

## 2025-04-02 PROCEDURE — 710N000010 HC RECOVERY PHASE 1, LEVEL 2, PER MIN: Performed by: SURGERY

## 2025-04-02 PROCEDURE — 94640 AIRWAY INHALATION TREATMENT: CPT

## 2025-04-02 PROCEDURE — 94660 CPAP INITIATION&MGMT: CPT

## 2025-04-02 PROCEDURE — 0Y950ZZ DRAINAGE OF RIGHT INGUINAL REGION, OPEN APPROACH: ICD-10-PCS | Performed by: SURGERY

## 2025-04-02 PROCEDURE — 250N000009 HC RX 250: Performed by: NURSE ANESTHETIST, CERTIFIED REGISTERED

## 2025-04-02 RX ORDER — POLYETHYLENE GLYCOL 3350 17 G/17G
17 POWDER, FOR SOLUTION ORAL DAILY
Status: ACTIVE | OUTPATIENT
Start: 2025-04-03

## 2025-04-02 RX ORDER — LIDOCAINE 40 MG/G
CREAM TOPICAL
Status: ACTIVE | OUTPATIENT
Start: 2025-04-02

## 2025-04-02 RX ORDER — FENTANYL CITRATE 50 UG/ML
INJECTION, SOLUTION INTRAMUSCULAR; INTRAVENOUS PRN
Status: DISCONTINUED | OUTPATIENT
Start: 2025-04-02 | End: 2025-04-02

## 2025-04-02 RX ORDER — ONDANSETRON 2 MG/ML
INJECTION INTRAMUSCULAR; INTRAVENOUS PRN
Status: DISCONTINUED | OUTPATIENT
Start: 2025-04-02 | End: 2025-04-02

## 2025-04-02 RX ORDER — OXYCODONE HYDROCHLORIDE 5 MG/1
10 TABLET ORAL EVERY 4 HOURS PRN
Status: ACTIVE | OUTPATIENT
Start: 2025-04-02

## 2025-04-02 RX ORDER — OXYCODONE HYDROCHLORIDE 5 MG/1
5 TABLET ORAL EVERY 4 HOURS PRN
Status: ACTIVE | OUTPATIENT
Start: 2025-04-02

## 2025-04-02 RX ORDER — DEXTROSE MONOHYDRATE 25 G/50ML
25-50 INJECTION, SOLUTION INTRAVENOUS
Status: DISCONTINUED | OUTPATIENT
Start: 2025-04-02 | End: 2025-04-02

## 2025-04-02 RX ORDER — CEFAZOLIN SODIUM/WATER 2 G/20 ML
2 SYRINGE (ML) INTRAVENOUS
Status: DISCONTINUED | OUTPATIENT
Start: 2025-04-02 | End: 2025-04-02 | Stop reason: HOSPADM

## 2025-04-02 RX ORDER — PROPOFOL 10 MG/ML
INJECTION, EMULSION INTRAVENOUS PRN
Status: DISCONTINUED | OUTPATIENT
Start: 2025-04-02 | End: 2025-04-02

## 2025-04-02 RX ORDER — SODIUM CHLORIDE, SODIUM LACTATE, POTASSIUM CHLORIDE, CALCIUM CHLORIDE 600; 310; 30; 20 MG/100ML; MG/100ML; MG/100ML; MG/100ML
INJECTION, SOLUTION INTRAVENOUS CONTINUOUS PRN
Status: DISCONTINUED | OUTPATIENT
Start: 2025-04-02 | End: 2025-04-02

## 2025-04-02 RX ORDER — SODIUM CHLORIDE, SODIUM LACTATE, POTASSIUM CHLORIDE, CALCIUM CHLORIDE 600; 310; 30; 20 MG/100ML; MG/100ML; MG/100ML; MG/100ML
INJECTION, SOLUTION INTRAVENOUS CONTINUOUS
Status: DISCONTINUED | OUTPATIENT
Start: 2025-04-02 | End: 2025-04-02 | Stop reason: HOSPADM

## 2025-04-02 RX ORDER — NICOTINE POLACRILEX 4 MG
15-30 LOZENGE BUCCAL
Status: DISCONTINUED | OUTPATIENT
Start: 2025-04-02 | End: 2025-04-02

## 2025-04-02 RX ORDER — ACETAMINOPHEN 325 MG/1
975 TABLET ORAL EVERY 8 HOURS
Status: DISPENSED | OUTPATIENT
Start: 2025-04-02

## 2025-04-02 RX ORDER — SODIUM CHLORIDE 9 MG/ML
INJECTION, SOLUTION INTRAVENOUS CONTINUOUS PRN
Status: DISCONTINUED | OUTPATIENT
Start: 2025-04-02 | End: 2025-04-02

## 2025-04-02 RX ORDER — ALBUMIN (HUMAN) 12.5 G/50ML
25 SOLUTION INTRAVENOUS ONCE
Status: COMPLETED | OUTPATIENT
Start: 2025-04-02 | End: 2025-04-02

## 2025-04-02 RX ORDER — HEPARIN SODIUM 10000 [USP'U]/100ML
0-5000 INJECTION, SOLUTION INTRAVENOUS CONTINUOUS
Status: DISPENSED | OUTPATIENT
Start: 2025-04-02

## 2025-04-02 RX ORDER — AMOXICILLIN 250 MG
1 CAPSULE ORAL 2 TIMES DAILY
Status: ACTIVE | OUTPATIENT
Start: 2025-04-02

## 2025-04-02 RX ORDER — CEFAZOLIN SODIUM/WATER 2 G/20 ML
2 SYRINGE (ML) INTRAVENOUS SEE ADMIN INSTRUCTIONS
Status: DISCONTINUED | OUTPATIENT
Start: 2025-04-02 | End: 2025-04-02 | Stop reason: HOSPADM

## 2025-04-02 RX ORDER — MAGNESIUM SULFATE HEPTAHYDRATE 40 MG/ML
2 INJECTION, SOLUTION INTRAVENOUS ONCE
Status: COMPLETED | OUTPATIENT
Start: 2025-04-02 | End: 2025-04-02

## 2025-04-02 RX ORDER — LIDOCAINE 40 MG/G
CREAM TOPICAL
Status: DISCONTINUED | OUTPATIENT
Start: 2025-04-02 | End: 2025-04-02 | Stop reason: HOSPADM

## 2025-04-02 RX ORDER — HYDROMORPHONE HCL IN WATER/PF 6 MG/30 ML
0.2 PATIENT CONTROLLED ANALGESIA SYRINGE INTRAVENOUS
Status: DISPENSED | OUTPATIENT
Start: 2025-04-02

## 2025-04-02 RX ORDER — BISACODYL 10 MG
10 SUPPOSITORY, RECTAL RECTAL DAILY PRN
Status: ACTIVE | OUTPATIENT
Start: 2025-04-05

## 2025-04-02 RX ORDER — HYDROMORPHONE HCL IN WATER/PF 6 MG/30 ML
0.4 PATIENT CONTROLLED ANALGESIA SYRINGE INTRAVENOUS
Status: ACTIVE | OUTPATIENT
Start: 2025-04-02

## 2025-04-02 RX ORDER — MAGNESIUM HYDROXIDE 1200 MG/15ML
LIQUID ORAL PRN
Status: DISCONTINUED | OUTPATIENT
Start: 2025-04-02 | End: 2025-04-02 | Stop reason: HOSPADM

## 2025-04-02 RX ORDER — LIDOCAINE HYDROCHLORIDE 10 MG/ML
INJECTION, SOLUTION INFILTRATION; PERINEURAL PRN
Status: DISCONTINUED | OUTPATIENT
Start: 2025-04-02 | End: 2025-04-02

## 2025-04-02 RX ADMIN — ALBUTEROL SULFATE 2.5 MG: 2.5 SOLUTION RESPIRATORY (INHALATION) at 12:10

## 2025-04-02 RX ADMIN — ALBUTEROL SULFATE 2.5 MG: 2.5 SOLUTION RESPIRATORY (INHALATION) at 07:55

## 2025-04-02 RX ADMIN — VANCOMYCIN HYDROCHLORIDE 1000 MG: 1 INJECTION, SOLUTION INTRAVENOUS at 23:13

## 2025-04-02 RX ADMIN — PHENYLEPHRINE HYDROCHLORIDE 200 MCG: 10 INJECTION INTRAVENOUS at 14:07

## 2025-04-02 RX ADMIN — ALBUMIN HUMAN 25 G: 0.25 SOLUTION INTRAVENOUS at 23:12

## 2025-04-02 RX ADMIN — SODIUM CHLORIDE, SODIUM LACTATE, POTASSIUM CHLORIDE, AND CALCIUM CHLORIDE: .6; .31; .03; .02 INJECTION, SOLUTION INTRAVENOUS at 15:01

## 2025-04-02 RX ADMIN — ALBUTEROL SULFATE 2.5 MG: 2.5 SOLUTION RESPIRATORY (INHALATION) at 22:40

## 2025-04-02 RX ADMIN — SODIUM CHLORIDE 30 MG/ML INHALATION SOLUTION 3 ML: 30 SOLUTION INHALANT at 07:55

## 2025-04-02 RX ADMIN — LIDOCAINE HYDROCHLORIDE 5 ML: 10 INJECTION, SOLUTION INFILTRATION; PERINEURAL at 14:07

## 2025-04-02 RX ADMIN — PROPOFOL 100 MG: 10 INJECTION, EMULSION INTRAVENOUS at 14:07

## 2025-04-02 RX ADMIN — ONDANSETRON 4 MG: 2 INJECTION INTRAMUSCULAR; INTRAVENOUS at 14:57

## 2025-04-02 RX ADMIN — HEPARIN SODIUM 1450 UNITS/HR: 10000 INJECTION, SOLUTION INTRAVENOUS at 22:22

## 2025-04-02 RX ADMIN — SODIUM CHLORIDE 30 MG/ML INHALATION SOLUTION 3 ML: 30 SOLUTION INHALANT at 00:05

## 2025-04-02 RX ADMIN — NOREPINEPHRINE BITARTRATE 0.03 MCG/KG/MIN: 0.02 INJECTION, SOLUTION INTRAVENOUS at 02:57

## 2025-04-02 RX ADMIN — ALBUTEROL SULFATE 2.5 MG: 2.5 SOLUTION RESPIRATORY (INHALATION) at 04:34

## 2025-04-02 RX ADMIN — ALBUTEROL SULFATE 2.5 MG: 2.5 SOLUTION RESPIRATORY (INHALATION) at 20:41

## 2025-04-02 RX ADMIN — FENTANYL CITRATE 100 MCG: 50 INJECTION, SOLUTION INTRAMUSCULAR; INTRAVENOUS at 14:07

## 2025-04-02 RX ADMIN — SODIUM CHLORIDE: 0.9 INJECTION, SOLUTION INTRAVENOUS at 14:01

## 2025-04-02 RX ADMIN — Medication 80 MG: at 14:08

## 2025-04-02 RX ADMIN — MAGNESIUM SULFATE HEPTAHYDRATE 2 G: 40 INJECTION, SOLUTION INTRAVENOUS at 05:57

## 2025-04-02 RX ADMIN — PIPERACILLIN AND TAZOBACTAM 3.38 G: 3; .375 INJECTION, POWDER, FOR SOLUTION INTRAVENOUS at 13:05

## 2025-04-02 RX ADMIN — PHENYLEPHRINE HYDROCHLORIDE 0.3 MCG/KG/MIN: 10 INJECTION INTRAVENOUS at 14:18

## 2025-04-02 RX ADMIN — PHENYLEPHRINE HYDROCHLORIDE 200 MCG: 10 INJECTION INTRAVENOUS at 14:25

## 2025-04-02 RX ADMIN — ONDANSETRON 4 MG: 2 INJECTION, SOLUTION INTRAMUSCULAR; INTRAVENOUS at 01:20

## 2025-04-02 RX ADMIN — CHLORHEXIDINE GLUCONATE 15 ML: 1.2 SOLUTION ORAL at 08:22

## 2025-04-02 RX ADMIN — ALBUTEROL SULFATE 2.5 MG: 2.5 SOLUTION RESPIRATORY (INHALATION) at 00:05

## 2025-04-02 RX ADMIN — PHENYLEPHRINE HYDROCHLORIDE 100 MCG: 10 INJECTION INTRAVENOUS at 14:17

## 2025-04-02 RX ADMIN — INSULIN GLARGINE 10 UNITS: 100 INJECTION, SOLUTION SUBCUTANEOUS at 23:13

## 2025-04-02 RX ADMIN — PIPERACILLIN AND TAZOBACTAM 3.38 G: 3; .375 INJECTION, POWDER, FOR SOLUTION INTRAVENOUS at 05:59

## 2025-04-02 RX ADMIN — PHENYLEPHRINE HYDROCHLORIDE 100 MCG: 10 INJECTION INTRAVENOUS at 14:14

## 2025-04-02 ASSESSMENT — ACTIVITIES OF DAILY LIVING (ADL)
ADLS_ACUITY_SCORE: 79
ADLS_ACUITY_SCORE: 85
ADLS_ACUITY_SCORE: 79
ADLS_ACUITY_SCORE: 85
ADLS_ACUITY_SCORE: 85
ADLS_ACUITY_SCORE: 79
ADLS_ACUITY_SCORE: 85
ADLS_ACUITY_SCORE: 83
ADLS_ACUITY_SCORE: 85
ADLS_ACUITY_SCORE: 85
ADLS_ACUITY_SCORE: 79
ADLS_ACUITY_SCORE: 83
ADLS_ACUITY_SCORE: 83
ADLS_ACUITY_SCORE: 79
ADLS_ACUITY_SCORE: 83
ADLS_ACUITY_SCORE: 83
ADLS_ACUITY_SCORE: 85
ADLS_ACUITY_SCORE: 79
ADLS_ACUITY_SCORE: 83
ADLS_ACUITY_SCORE: 85
ADLS_ACUITY_SCORE: 83

## 2025-04-02 ASSESSMENT — LIFESTYLE VARIABLES: TOBACCO_USE: 1

## 2025-04-02 ASSESSMENT — COPD QUESTIONNAIRES: COPD: 1

## 2025-04-02 ASSESSMENT — ENCOUNTER SYMPTOMS: DYSRHYTHMIAS: 1

## 2025-04-02 NOTE — ANESTHESIA CARE TRANSFER NOTE
Patient: Marly Cannon    Procedure: Procedure(s):  INCISION AND DRAINAGE, PERINEUM       Diagnosis: Perineal abscess [L02.215]  Diagnosis Additional Information: No value filed.    Anesthesia Type:   General     Note:    Oropharynx: oropharynx clear of all foreign objects and spontaneously breathing  Level of Consciousness: drowsy  Oxygen Supplementation: face mask  Level of Supplemental Oxygen (L/min / FiO2): 9  Independent Airway: airway patency satisfactory and stable  Dentition: dentition unchanged  Vital Signs Stable: post-procedure vital signs reviewed and stable  Report to RN Given: handoff report given  Patient transferred to: PACU    Handoff Report: Identifed the Patient, Identified the Reponsible Provider, Reviewed the pertinent medical history, Discussed the surgical course, Reviewed Intra-OP anesthesia mangement and issues during anesthesia, Set expectations for post-procedure period and Allowed opportunity for questions and acknowledgement of understanding      Vitals:  Vitals Value Taken Time   BP     Temp     Pulse     Resp     SpO2         Electronically Signed By: BLAIR Almaguer CRNA  April 2, 2025  3:16 PM

## 2025-04-02 NOTE — ANESTHESIA PREPROCEDURE EVALUATION
Anesthesia Pre-Procedure Evaluation    Patient: Marly Cannon   MRN: [id number] : 1965        Procedure : Procedure(s):  INCISION AND DRAINAGE, PERINEUM          No past medical history on file.   Past Surgical History:   Procedure Laterality Date    BIOPSY BREAST Left 2008    patient states no bx, Clip in left breast/ stereo bxc  no path in EPIC that far back    COLONOSCOPY N/A 2015    Procedure: COLONOSCOPY;  Surgeon: Gentry Porter MD;  Location: [facility] OR    COLONOSCOPY N/A 2018    Procedure: COLONOSCOPY;  COLONOSCOPY WITH POLYPECTOMY;  Surgeon: Gentry Porter MD;  Location: [facility] OR      No Known Allergies   Social History     Tobacco Use    Smoking status: Former     Current packs/day: 1.00     Average packs/day: 1 pack/day for 46.2 years (46.2 ttl pk-yrs)     Types: Cigarettes     Start date: 1979     Passive exposure: Current    Smokeless tobacco: Never    Tobacco comments:     Declined QP 2020   Substance Use Topics    Alcohol use: No     Alcohol/week: 0.0 standard drinks of alcohol      Wt Readings from Last 1 Encounters:   25 100.4 kg (221 lb 5.5 oz)        Anesthesia Evaluation   Pt has had prior anesthetic.     No history of anesthetic complications       ROS/MED HX  ENT/Pulmonary:     (+) sleep apnea, uses CPAP,              tobacco use,          COPD, O2 dependent,   recent URI,  extubated yesterday:        Neurologic:       Cardiovascular:     (+)  hypertension- -   -  - -                        dysrhythmias, a-fib,             METS/Exercise Tolerance:     Hematologic:       Musculoskeletal:       GI/Hepatic:       Renal/Genitourinary:     (+) renal disease, type: CRI,            Endo:     (+)  type II DM,             Obesity,       Psychiatric/Substance Use:       Infectious Disease:       Malignancy:       Other:            Physical Exam    Airway        Mallampati: II       Respiratory Devices and Support     High Flow Nasal Canula       Dental       (+) Edentulous      Cardiovascular          Rhythm and rate: regular     Pulmonary           (+) decreased breath sounds       OUTSIDE LABS:  CBC:   Lab Results   Component Value Date    WBC 11.8 (H) 04/02/2025    WBC 11.8 (H) 04/02/2025    HGB 12.3 04/02/2025    HGB 12.2 04/01/2025    HCT 36.6 04/02/2025    HCT 36.8 04/01/2025     04/02/2025     04/01/2025     BMP:   Lab Results   Component Value Date     04/02/2025     04/01/2025    POTASSIUM 3.9 04/02/2025    POTASSIUM 4.3 04/01/2025    CHLORIDE 101 04/02/2025    CHLORIDE 103 04/01/2025    CO2 29 04/02/2025    CO2 26 04/01/2025    BUN 32.5 (H) 04/02/2025    BUN 45.8 (H) 04/01/2025    CR 1.58 (H) 04/02/2025    CR 1.73 (H) 04/01/2025    GLC 97 04/02/2025     (H) 04/02/2025     COAGS:   Lab Results   Component Value Date    PTT 39 (H) 03/24/2025    INR 1.24 (H) 03/31/2025     POC:   Lab Results   Component Value Date     (H) 09/21/2018     HEPATIC:   Lab Results   Component Value Date    ALBUMIN 1.8 (L) 03/31/2025    PROTTOTAL 4.9 (L) 03/31/2025    ALT 16 03/31/2025    AST 25 03/31/2025    ALKPHOS 62 03/31/2025    BILITOTAL 0.3 03/31/2025     OTHER:   Lab Results   Component Value Date    PH 7.44 04/01/2025    LACT 1.0 03/31/2025    A1C 12.7 (H) 03/22/2025    NORM 8.6 (L) 04/02/2025    PHOS 4.3 04/02/2025    MAG 1.9 04/02/2025    TSH 1.18 03/18/2025    CRP <2.9 07/01/2019       Anesthesia Plan    ASA Status:  4       Anesthesia Type: General.     - Airway: ETT   Induction: RSI.   Maintenance: Inhalation.        Consents    Anesthesia Plan(s) and associated risks, benefits, and realistic alternatives discussed. Questions answered and patient/representative(s) expressed understanding.     - Discussed:     - Discussed with:  Patient      - Extended Intubation/Ventilatory Support Discussed: Yes.      - Patient is DNR/DNI Status: No     Use of blood products discussed: No .     Postoperative Care    Pain management:  "IV analgesics.   PONV prophylaxis: Ondansetron (or other 5HT-3), Dexamethasone or Solumedrol     Comments:             Gayla Calixto MD    Clinically Significant Risk Factors           # Hypocalcemia: Lowest iCa = 4.3 mg/dL in last 2 days, will monitor and replace as appropriate     # Hypoalbuminemia: Lowest albumin = 1.7 g/dL at 3/31/2025  5:35 PM, will monitor as appropriate  # Coagulation Defect: INR = 1.24 (Ref range: 0.85 - 1.15) and/or PTT = 39 Seconds (Ref range: 22 - 38 Seconds), will monitor for bleeding    # Hypertension: Noted on problem list     # Acute Hypoxic Respiratory Failure: Documented O2 saturation < 90%. Continue supplemental oxygen as needed        # DMII: A1C = 12.7 % (Ref range: <5.7 %) within past 6 months   # Obesity: Estimated body mass index is 36.83 kg/m  as calculated from the following:    Height as of 3/22/25: 1.651 m (5' 5\").    Weight as of this encounter: 100.4 kg (221 lb 5.5 oz)., PRESENT ON ADMISSION     # COPD: noted on problem list          "

## 2025-04-02 NOTE — ANESTHESIA POSTPROCEDURE EVALUATION
Patient: Marly Cannon    Procedure: Procedure(s):  INCISION AND DRAINAGE, PERINEUM       Anesthesia Type:  General    Note:  Disposition: ICU            ICU Sign Out: Patient in ICU prior to OR.   Postop Pain Control: Uneventful            Sign Out: Well controlled pain   PONV: No   Neuro/Psych: Uneventful            Sign Out: Acceptable/Baseline neuro status   Airway/Respiratory: Uneventful            Sign Out: O2 supplementation               Oxygen: Preop HFNC resumed in PACU.   CV/Hemodynamics: Uneventful            Sign Out: Acceptable CV status; No obvious hypovolemia; No obvious fluid overload   Other NRE: NONE   DID A NON-ROUTINE EVENT OCCUR? No         Last vitals:  Vitals Value Taken Time   /64 04/02/25 1523   Temp 36.9  C (98.5  F) 04/02/25 1523   Pulse 83 04/02/25 1542   Resp 22 04/02/25 1542   SpO2 92 % 04/02/25 1542   Vitals shown include unfiled device data.    Electronically Signed By: Gayla Calixto MD  April 2, 2025  3:44 PM

## 2025-04-02 NOTE — ANESTHESIA PROCEDURE NOTES
Airway       Patient location during procedure: OR       Procedure Start/Stop Times: 4/2/2025 2:10 PM  Staff -        CRNA: Anupam Manjarrez APRN CRNA       Performed By: CRNA  Consent for Airway        Urgency: elective  Indications and Patient Condition       Indications for airway management: reji-procedural       Induction type:RSI       Mask difficulty assessment: 0 - not attempted    Final Airway Details       Final airway type: endotracheal airway       Successful airway: ETT - single  Endotracheal Airway Details        ETT size (mm): 7.0       Cuffed: yes       Successful intubation technique: video laryngoscopy       VL Blade Size: Jackson 2       Grade View of Cords: 1       Adjucts: stylet       Position: Right       Measured from: lips       Secured at (cm): 20       Bite block used: None    Post intubation assessment        Placement verified by: capnometry, equal breath sounds and chest rise        Number of attempts at approach: 1       Number of other approaches attempted: 0       Secured with: tape       Ease of procedure: easy       Dentition: Unchanged    Medication(s) Administered   Medication Administration Time: 4/2/2025 2:10 PM

## 2025-04-02 NOTE — PLAN OF CARE
Goal Outcome Evaluation:      Plan of Care Reviewed With: patient, sibling    Overall Patient Progress: no changeOverall Patient Progress: no change    Outcome Evaluation: Restarted levophed gtt around 0300.          [facility] - ICU    RN Progress Note:            Pertinent Assessments:      Please refer to flowsheet rows for full assessment     Pt is alert and oriented x2, knows she is in a hospital but cannot say why. Denies pain. LS coarse/diminished. Adequate UOP.            Key Events - This Shift:       Placed on bipap around 0200. Restarted levophed gtt. Turned L-R to avoid pressure on wounds on buttocks. See additional note.     Dignicare removed; draining copious amounts from penrose drain.      RN Managed Protocols Ordered:  Yes  Protocols:Potassium, Magnesium, and Phosphorus  PRN'S:  Protocols Status: Reviewed with Oncoming RN                Barriers to Discharge / Downgrade:     Vasopressor, insulin gtt

## 2025-04-02 NOTE — PROGRESS NOTES
General Surgery Progress Note:    Hospital Day # 2    ASSESSMENT:  1. Pressure injury of buttock, stage 2, unspecified laterality (H) [L89.302]    2. Perineal abscess        Marly Cannon is a 59 year old female with PMH of COPD (on home O2), HTN, CKD, DM II who was admitted to OSH on 3/22/25-3/31/25 with DKA and acute hypoxic respiratory failure in setting of LLL PE. Hospital course complicated by YADIRA, diastolic insufficiency, ventilator acquired pneumonia and chronic decubitus ulcer with draining sinus now s/p bedside I&D and Penrose drain placement on 3/31. Right labia with worsening erythema and now small area concerning for necrosis, needs further debridement. Plan for OR I&D later today.    PLAN:  - NPO for procedure  - Continue IV antibiotics  - Please hold heparin gtt at 10 AM prior to surgery  - Appreciate excellent ICU cares  - General surgery following    SUBJECTIVE:   She is now extubated. Endorses pain to right labia and groin. Per RN, wound with Penrose was draining copious amounts of fluid overnight, slowed down this morning but redness increasing around groin.       Patient Vitals for the past 24 hrs:   BP Temp Temp src Pulse Resp SpO2 Weight   04/02/25 0930 -- -- -- 85 26 97 % --   04/02/25 0915 -- -- -- 81 22 (!) 89 % --   04/02/25 0900 -- -- -- 81 21 100 % --   04/02/25 0845 -- -- -- 80 21 99 % --   04/02/25 0830 -- -- -- 80 24 97 % --   04/02/25 0824 119/56 -- -- 81 23 95 % --   04/02/25 0815 -- -- -- 80 24 94 % --   04/02/25 0809 -- -- -- 80 21 96 % --   04/02/25 0800 -- 97.8  F (36.6  C) Axillary 82 23 93 % --   04/02/25 0745 -- -- -- 81 24 93 % --   04/02/25 0730 -- -- -- 84 23 92 % --   04/02/25 0715 -- -- -- 83 24 (!) 89 % --   04/02/25 0700 -- -- -- 86 26 (!) 90 % --   04/02/25 0645 -- -- -- 84 24 92 % --   04/02/25 0635 -- -- -- -- -- 98 % --   04/02/25 0630 -- -- -- 84 24 99 % --   04/02/25 0615 -- -- -- 79 22 100 % 100.4 kg (221 lb 5.5 oz)   04/02/25 0600 -- -- -- 81 22 100 % --    04/02/25 0545 -- -- -- 81 22 100 % --   04/02/25 0530 -- -- -- 84 25 100 % --   04/02/25 0515 -- -- -- 85 22 94 % --   04/02/25 0500 -- -- -- 84 (!) 34 95 % --   04/02/25 0445 -- -- -- 86 (!) 45 98 % --   04/02/25 0434 -- -- -- 88 27 98 % --   04/02/25 0430 -- -- -- 88 (!) 40 97 % --   04/02/25 0415 -- -- -- 90 28 96 % --   04/02/25 0400 -- 98.1  F (36.7  C) Oral 91 27 95 % --   04/02/25 0345 -- -- -- 90 27 93 % --   04/02/25 0330 -- -- -- 90 26 92 % --   04/02/25 0315 -- -- -- 86 24 92 % --   04/02/25 0300 -- -- -- 90 25 92 % --   04/02/25 0245 -- -- -- 87 25 (!) 91 % --   04/02/25 0230 -- -- -- 83 22 97 % --   04/02/25 0224 -- -- -- 82 23 98 % --   04/02/25 0215 -- -- -- 84 (!) 36 97 % --   04/02/25 0200 -- -- -- 83 23 98 % --   04/02/25 0100 -- -- -- 83 24 94 % --   04/02/25 0005 -- -- -- 82 24 97 % --   04/02/25 0000 -- 98  F (36.7  C) Oral 83 24 98 % --   04/01/25 2300 -- -- -- 92 27 96 % --   04/01/25 2200 -- -- -- 86 24 (!) 90 % --   04/01/25 2115 -- -- -- 90 23 96 % --   04/01/25 2100 -- -- -- 90 24 95 % --   04/01/25 2020 (!) 143/70 -- -- -- -- -- --   04/01/25 2000 -- 98.2  F (36.8  C) Oral 90 (!) 32 94 % --   04/01/25 1940 -- -- -- 85 22 95 % --   04/01/25 1900 -- -- -- 85 25 95 % --   04/01/25 1801 -- 98  F (36.7  C) Oral 83 23 95 % --   04/01/25 1800 -- -- -- 83 23 95 % --   04/01/25 1700 -- -- -- 84 25 95 % --   04/01/25 1600 -- 97.7  F (36.5  C) Oral 84 26 94 % --   04/01/25 1537 -- -- -- 84 22 92 % --   04/01/25 1500 -- -- -- 84 23 92 % --   04/01/25 1400 -- -- -- 83 23 (!) 91 % --   04/01/25 1300 -- -- -- 86 26 95 % --   04/01/25 1250 -- -- -- 88 25 (!) 91 % --   04/01/25 1230 -- -- -- 84 24 94 % --   04/01/25 1200 -- 97.7  F (36.5  C) Oral 83 24 93 % --   04/01/25 1100 -- -- -- 78 20 92 % --   04/01/25 1015 -- -- -- 79 19 (!) 90 % --   04/01/25 1000 -- -- -- 80 20 (!) 91 % --         PHYSICAL EXAM:  General: patient seen resting in bed, no acute distress  Resp: no respiratory distress, breathing  comfortably on BiPAP  Abdomen: Obese, soft, non-tender.  /GYN: Landrum in place. Right labia and mons pubis with erythema and induration, small area of necrosis of right labia. Penrose drain in place draining malodorous purulent fluid.    04/01 0700 - 04/02 0659  In: 1140.51 [I.V.:1125.51]  Out: 4220 [Urine:4195]    Admission on 03/31/2025   Component Date Value    pH Arterial 03/31/2025 7.39     pCO2 Arterial 03/31/2025 48 (H)     pO2 Arterial 03/31/2025 72 (L)     FIO2 03/31/2025 50     Bicarbonate Arterial 03/31/2025 29 (H)     Base Excess/Deficit Keyona* 03/31/2025 3.1 (H)     Oxyhemoglobin Arterial 03/31/2025 93     O2 Sat, Arterial 03/31/2025 94.1 (L)     Peep 03/31/2025 8     WBC Count 03/31/2025 10.7     RBC Count 03/31/2025 4.03     Hemoglobin 03/31/2025 12.0     Hematocrit 03/31/2025 36.7     MCV 03/31/2025 91     MCH 03/31/2025 29.8     MCHC 03/31/2025 32.7     RDW 03/31/2025 14.6     Platelet Count 03/31/2025 283     Sodium 03/31/2025 139     Potassium 03/31/2025 4.4     Carbon Dioxide (CO2) 03/31/2025 28     Anion Gap 03/31/2025 7     Urea Nitrogen 03/31/2025 44.1 (H)     Creatinine 03/31/2025 1.84 (H)     GFR Estimate 03/31/2025 31 (L)     Calcium 03/31/2025 8.2 (L)     Chloride 03/31/2025 104     Glucose 03/31/2025 90     Alkaline Phosphatase 03/31/2025 62     AST 03/31/2025 25     ALT 03/31/2025 16     Protein Total 03/31/2025 4.9 (L)     Albumin 03/31/2025 1.8 (L)     Bilirubin Total 03/31/2025 0.3     Magnesium 03/31/2025 1.9     Phosphorus 03/31/2025 5.6 (H)     Calcium Ionized Whole Bl* 03/31/2025 4.7     Lactic Acid 03/31/2025 1.0     INR 03/31/2025 1.24 (H)     Procalcitonin 03/31/2025 0.34     Urea Nitrogen Urine mg/dL 03/31/2025 329.0 (L)     Culture 04/01/2025 No growth after 1 day     Culture 04/01/2025 No growth after 1 day     Color Urine 03/31/2025 Light Yellow     Appearance Urine 03/31/2025 Turbid (A)     Glucose Urine 03/31/2025 70 (A)     Bilirubin Urine 03/31/2025 Negative     Ketones  Urine 03/31/2025 Negative     Specific Gravity Urine 03/31/2025 1.013     Blood Urine 03/31/2025 0.2 mg/dL (A)     pH Urine 03/31/2025 5.0     Protein Albumin Urine 03/31/2025 Negative     Urobilinogen Urine 03/31/2025 Normal     Nitrite Urine 03/31/2025 Negative     Leukocyte Esterase Urine 03/31/2025 25 Santy/uL (A)     RBC Urine 03/31/2025 36 (H)     WBC Urine 03/31/2025 3     Squamous Epithelials Uri* 03/31/2025 10 (H)     Legionella pneumophila s* 03/31/2025 Negative     Streptococcus pneumoniae* 03/31/2025 Negative     Legionella pneumophila U* 03/31/2025 Urine     Anti Xa Unfractionated H* 03/31/2025 0.36     GLUCOSE BY METER POCT 03/31/2025 92     GS Culture 03/31/2025 See corresponding culture for results     Gram Stain Result 03/31/2025 <25 PMNs/low power field     Gram Stain Result 03/31/2025 No organisms seen     Culture 03/31/2025 No growth after 1 day     NANI Preparation 03/31/2025 No fungal elements seen     NANI Preparation 03/31/2025 Reference Range: No fungal elements seen.     Culture 03/31/2025 No growth after 1 day     Culture 03/31/2025 Culture negative, monitoring continues     Culture 03/31/2025 No growth after 1 day     Color 03/31/2025 Colorless     Clarity 03/31/2025 Cloudy (A)     Cell Count Fluid Source 03/31/2025 Lung, Middle Lobe, Right     Total Nucleated Cells 03/31/2025 49     GLUCOSE BY METER POCT 03/31/2025 101 (H)     pH Arterial 03/31/2025 7.42     pCO2 Arterial 03/31/2025 42     pO2 Arterial 03/31/2025 68 (L)     FIO2 03/31/2025 50     Bicarbonate Arterial 03/31/2025 28     Base Excess/Deficit Keyona* 03/31/2025 2.8     Oxyhemoglobin Arterial 03/31/2025 93     O2 Sat, Arterial 03/31/2025 93.6 (L)     Peep 03/31/2025 8     Creatinine Urine mg/dL 03/31/2025 33.6     Sodium Urine mmol/L 03/31/2025 86     %FENA 03/31/2025 3.4     % Neutrophils 03/31/2025 11     % Lymphocytes 03/31/2025 0     % Monocyte/Macrophages 03/31/2025 30     % Lining Cells 03/31/2025 59     Pathologist Review  Comme* 03/31/2025 Macrophages loaded with hemosiderin, consistent with bleeding. Reactive alveolar epithelial cells. No malignant cells. Gerard Box. MD 4/1/2025 1600.     Anti Xa Unfractionated H* 04/01/2025 0.51     GLUCOSE BY METER POCT 03/31/2025 129 (H)     Sodium 04/01/2025 139     Potassium 04/01/2025 4.3     Chloride 04/01/2025 103     Carbon Dioxide (CO2) 04/01/2025 26     Anion Gap 04/01/2025 10     Urea Nitrogen 04/01/2025 45.8 (H)     Creatinine 04/01/2025 1.73 (H)     GFR Estimate 04/01/2025 33 (L)     Calcium 04/01/2025 8.3 (L)     Glucose 04/01/2025 173 (H)     WBC Count 04/01/2025 11.8 (H)     RBC Count 04/01/2025 4.07     Hemoglobin 04/01/2025 12.2     Hematocrit 04/01/2025 36.8     MCV 04/01/2025 90     MCH 04/01/2025 30.0     MCHC 04/01/2025 33.2     RDW 04/01/2025 14.3     Platelet Count 04/01/2025 286     Magnesium 04/01/2025 2.3     pH Arterial 04/01/2025 7.44     pCO2 Arterial 04/01/2025 39     pO2 Arterial 04/01/2025 63 (L)     FIO2 04/01/2025 50     Bicarbonate Arterial 04/01/2025 27     Base Excess/Deficit [place]* 04/01/2025 2.3     Oxyhemoglobin Arterial 04/01/2025 92     O2 Sat, Arterial 04/01/2025 92.8 (L)     Peep 04/01/2025 10     Phosphorus 04/01/2025 4.9 (H)     GLUCOSE BY METER POCT 04/01/2025 164 (H)     GLUCOSE BY METER POCT 04/01/2025 177 (H)     GLUCOSE BY METER POCT 04/01/2025 168 (H)     GLUCOSE BY METER POCT 04/01/2025 154 (H)     GLUCOSE BY METER POCT 04/01/2025 124 (H)     GLUCOSE BY METER POCT 04/01/2025 139 (H)     GLUCOSE BY METER POCT 04/01/2025 142 (H)     GLUCOSE BY METER POCT 04/01/2025 151 (H)     GLUCOSE BY METER POCT 04/01/2025 142 (H)     GLUCOSE BY METER POCT 04/01/2025 125 (H)     GLUCOSE BY METER POCT 04/01/2025 120 (H)     GLUCOSE BY METER POCT 04/01/2025 114 (H)     GLUCOSE BY METER POCT 04/01/2025 113 (H)     GLUCOSE BY METER POCT 04/01/2025 100 (H)     Sodium 04/02/2025 139     Potassium 04/02/2025 3.9     Chloride 04/02/2025 101     Carbon Dioxide  (CO2) 04/02/2025 29     Anion Gap 04/02/2025 9     Urea Nitrogen 04/02/2025 32.5 (H)     Creatinine 04/02/2025 1.58 (H)     GFR Estimate 04/02/2025 37 (L)     Calcium 04/02/2025 8.6 (L)     Glucose 04/02/2025 118 (H)     WBC Count 04/02/2025 11.8 (H)     RBC Count 04/02/2025 4.01     Hemoglobin 04/02/2025 12.3     Hematocrit 04/02/2025 36.6     MCV 04/02/2025 91     MCH 04/02/2025 30.7     MCHC 04/02/2025 33.6     RDW 04/02/2025 14.4     Platelet Count 04/02/2025 372     Magnesium 04/02/2025 1.9     Anti Xa Unfractionated H* 04/02/2025 0.39     Phosphorus 04/02/2025 4.3     GLUCOSE BY METER POCT 04/02/2025 104 (H)     GLUCOSE BY METER POCT 04/02/2025 106 (H)     WBC Count 04/02/2025 11.8 (H)     % Neutrophils 04/02/2025 85     % Lymphocytes 04/02/2025 8     % Monocytes 04/02/2025 6     % Eosinophils 04/02/2025 1     % Basophils 04/02/2025 0     % Immature Granulocytes 04/02/2025 1     NRBCs per 100 WBC 04/02/2025 0     Absolute Neutrophils 04/02/2025 10.0 (H)     Absolute Lymphocytes 04/02/2025 0.9     Absolute Monocytes 04/02/2025 0.7     Absolute Eosinophils 04/02/2025 0.1     Absolute Basophils 04/02/2025 0.0     Absolute Immature Granul* 04/02/2025 0.1     Absolute NRBCs 04/02/2025 0.0     GLUCOSE BY METER POCT 04/02/2025 118 (H)     GLUCOSE BY METER POCT 04/02/2025 114 (H)     GLUCOSE BY METER POCT 04/02/2025 105 (H)        Tamara Corey PA-C  [address]  [address]  [place] [address]?  Office: [phone]

## 2025-04-02 NOTE — PROGRESS NOTES
Dr. Segura came to bedside at 2300 to assess perineal abscess due to increased drainage. Unsure at this time if pt is bypassing dignicare or draining from penrose. Worsening redness of perineal area marked. Calmoseptine ordered for wounds on buttocks.

## 2025-04-02 NOTE — CONSULTS
INFECTIOUS DISEASE CONSULT NOTE    Date: 04/02/2025   CHIEF COMPLAINT: No chief complaint on file.       ================================================================  SUBJECTIVE    HPI  59f with perianal abscess.  PMH intellectual disability, COPD w/ severe obstruction, active tobacco use, obesity, ADEEL, secondary polycythemia, DM2, CKD3, HTN.    Recently admitted 3/18-3/20 for DKA + respiratory failure + condyloma acuminata.  Was discharged with insulin pump, 2L oxygen, acyclovir.      Presented again on 3/22 with chest pain, shortness of breath, found to have DKA, distributive shock, respiratory failure, PE.  She required intubation, and this hospital course was complicated by VAP and perianal abscess s/p I&D with penrose placement on 3/31.  Chest CT on 3/31 showed bilateral lobe collapse.  She has been on pip-tazo since 3/23.  Underwent BAL on 3/31, studies are pending. Plan is to repeat surgical exploration of abscess on 4/2.    Past Medical History  Active Ambulatory Problems     Diagnosis Date Noted    Type 2 diabetes, HbA1c goal < 7% (H) 07/24/2015    ACP (advance care planning) 09/09/2016    Stage 3 chronic kidney disease (H) 02/12/2016    Pulmonary emphysema (H) 08/21/2015    Hyperparathyroidism due to renal insufficiency 06/14/2016    Vitamin D deficiency 06/14/2016    Intellectual disability 08/01/2017    Breast fibrocystic disorder 02/22/2008    Tobacco abuse 07/09/2015    Microalbuminuria due to type 2 diabetes mellitus (H) 06/14/2016    H/O colonoscopy 04/24/2019    [facility] cardiac risk <10% in next 10 years 02/22/2019    Tubular adenoma of colon 09/28/2018    Hyperlipidemia, unspecified hyperlipidemia type 05/28/2019    Essential hypertension 05/28/2019    Callus of foot 05/29/2019    Memory disturbance 02/13/2020    ADEEL (obstructive sleep apnea) 02/16/2021    Tremor 11/09/2009    Diabetic polyneuropathy associated with diabetes mellitus due to underlying condition (H) 01/17/2023    Urinary  incontinence, unspecified type 01/17/2023    Diabetic ketosis (H) 03/18/2025    Unable to care for self 03/18/2025    PAF (paroxysmal atrial fibrillation) (H) 03/22/2025    Pneumonia 03/22/2025    Hyperglycemia 03/22/2025    Acute and chronic respiratory failure with hypoxia (H) 03/22/2025     Resolved Ambulatory Problems     Diagnosis Date Noted    Morbid obesity (H) 02/12/2016    Hypomagnesemia 04/10/2018     No Additional Past Medical History       Past Surgical History  She   has a past surgical history that includes Colonoscopy (N/A, 08/20/2015); Colonoscopy (N/A, 09/21/2018); and Biopsy breast (Left, 02/14/2008).    Social History  she   reports that she has quit smoking. Her smoking use included cigarettes. She started smoking about 46 years ago. She has a 46.2 pack-year smoking history. She has been exposed to tobacco smoke. She has never used smokeless tobacco. She reports that she does not drink alcohol and does not use drugs.    Family History  Reviewed and non-contributory  family history includes Diabetes in her father, mother, and sister; Hypertension in her brother.    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 Buttock ulcer per below      Pertinent labs  CBC RESULTS:   Recent Labs   Lab Test 04/02/25  0412   WBC 11.8*  11.8*   RBC 4.01   HGB 12.3   HCT  36.6   MCV 91   MCH 30.7   MCHC 33.6   RDW 14.4           Imaging  3/22 CTA chest  1.  Very small pulmonary artery emboli to subsegmental branches of the left lower lobe. No right heart strain.  2.  Mild emphysematous changes of the lungs. No focal pulmonary consolidation or pleural effusion.  3.  Trace pericardial effusion.  4.  Small sliding-type hiatal hernia.    3/24 CTA chest  Exam positive for pulmonary embolism. There has been progression of emboli as seen previously. There is now involvement a segmental and subsegmental arteries of the right lower lobe. There is  now atelectasis of the entire right lower lobe with volume loss. There is some abnormal enhancement within portions of the right lower lobe which may be sequela of infarction/ischemia. There is opacification of bronchi within the right lower lobe. Mucous plugging may be present.12    3/24 CT A/P  There is gallbladder distention. A small amount of gallbladder sludge is possible but no calcified gallstones are seen and there is no biliary dilatation. There is diffuse subcutaneous edema. Small right inguinal hernia containing fat. No loops of bowel are involved.    3/31 CT pelvis  Right inguinal hernia containing fat. Worsening subcutaneous edema and edema in the perineum and labia on the right as compared to previous examination. No discrete abscess is seen.    3/31 CT chest  1.  Complete right and near complete left lower lobe collapse.  2.  Mild groundglass density in the right middle lobe, nonspecific.      Microbiology data  3/17 flu/covid/rsv: negative  3/17 blood: NG  3/22 blood: NG  3/24 MRSA nares: negative  3/26 sputum culture: candida, GPCs  3/31 serum galactomannan: pending  3/31 beta-D glucan: pending  3/31 BAL   49 PMNs, 11% PMNs, 30% monocytes, 59% lining cells   Culture: NGTD   AFB culture: pending   Histoplasma Ag: pending   Nocardia culture: NGTD   Legionella: negative  4/1 blood: NGTD    I have personally reviewed the relevant  laboratory, imaging, and microbiology data  ================================================================  Assessment  Marly Cannon is a 59 year old with perianal abscess.  PMH intellectual disability, COPD w/ severe obstruction, active tobacco use, obesity, ADEEL, secondary polycythemia, DM2, CKD3, HTN.    Recently admitted 3/18-3/20 for DKA + respiratory failure + condyloma acuminata.  Was discharged with insulin pump, 2L oxygen, acyclovir.  Presented again on 3/22 with chest pain, shortness of breath, found to have DKA, distributive shock, respiratory failure, PE.  She required intubation, and this hospital course was complicated by VAP and perianal abscess s/p I&D with penrose placement on 3/31.  Chest CT on 3/31 showed bilateral lobe collapse.  She has been on pip-tazo since 3/23.  Underwent BAL on 3/31, studies are pending. Plan is to repeat surgical exploration of abscess on 4/2.    For now will continue broad antibiotics to cover both GI/ dre as well as hospital-associated pulmonary pathogens.  Over the last 7 days some of her clinical parameters have improved (downtrending WBC count, now extubated), but still significantly inflamed and with collapse of her lower lung lobes means duration of therapy will need to be extended.    Impression  # Perianal abscess   - s/p penrose drain placement 3/31   - s/p I&D 4/2  # Condyloma acuminata   - s/p acyclovir in mid-March  # Acute hypoxic respiratory failure  # RML pneumonia, ventilator associated  # Bilateral lower lobe collapse  # Segmental pulmonary embolus  # Severe COPD  # Poorly controlled DM2 (A1C 12)    ================================================================  Plan  - Continue vanc + pip-tazo  - Obtain cultures in OR today if feasible  - Trend results from 3/31 BAL  - ID will follow    Jeremy Cannon MD, MPH  [facility]   Office Telephone [phone].  Fax [phone]  [facility] paging

## 2025-04-02 NOTE — PLAN OF CARE
Goal Outcome Evaluation:      Plan of Care Reviewed With: patient, sibling    Overall Patient Progress: no changeOverall Patient Progress: no change    Outcome Evaluation: Patient NPO for planned I&D of right groin abcess. Heparin drip stopped at 1000 per PA. Levophed stopped this AM. Patient transitioned off insulin drip to sliding scale.

## 2025-04-02 NOTE — PLAN OF CARE
[facility] - ICU    RN Progress Note:            Pertinent Assessments:      Please refer to flowsheet rows for full assessment     Afebrile.  Flat affect. Extra large loose incontinent stools x2- patient does not tell us that she has went to the bathroom.  Wounds on buttock and perianal abscess site- placed diginicare.            Key Events - This Shift:       Up in chair.  Hoyered back to bed after stool incontinence.  Resistant to turns.  Remains on heparin drip.  Gervais in place.  Fights cares.  Reamins on HFNC- 50L/40%.     RN Managed Protocols Ordered:  Yes  Protocols:Potassium and Magnesium Phosphorus  Protocols Status: Reviewed with Oncoming RN                Barriers to Discharge / Downgrade:     Could step out of ICU.          Point of Contact Update: YES-OR-NO: Yes  Name:Mary/Tavo   Phone Number:see chart   Summary of Conversation: updated via phone-both able to talk to patient on the phone.

## 2025-04-02 NOTE — OP NOTE
18x8x5  6x2x1  General Surgery Operative Note    Date of Surgery: 04/02/25      Surgeon: Jeremy Lyons MD    Assistant: Daniel Corey PA-C.  Assistance was required for retraction during the case    Preoperative Diagnosis: Perineal and right groin infection    Postoperative Diagnosis: Perineal and right groin necrotizing soft tissue infection    Procedure: Incision and drainage of perineum and right groin    EBL: 50 cc    Findings: Infection traveling up the right labia into the right groin.  Final debridement included a right groin defect measuring 18 x 8 x 5 cm and a perineal wound measuring 6 x 2 x 1 cm.    Indications:  Patient is a 59-year-old woman admitted to hospital with DKA and acute hypoxic respiratory failure.  She had a right peroneal wound that was opened and a Penrose drain was placed.  She continued to have some evidence of worsening infection in the right groin.  Decision made to proceed to the operating room for incision and drainage.  Appropriate consent form signed.    Details of operation:  Brought to the operating room and laid on the table in the supine position.  General anesthesia was induced without incident.  The patient was placed in lithotomy position.  Her perineum was prepped and draped in usual sterile fashion.  A timeout for safety was formed.    I began by removing the Penrose drain.  I bluntly opened the space superiorly up the right labia using my finger revealing the infection tracked superiorly up to the right groin.  I made a counterincision in the superior right groin.  The infection had traveled significantly up into this area and there was quite a bit of tunneling and evidence of necrotic tissue and infection.  Area was sharply opened widely ultimately to the dimensions listed above.  The skin overlying the right labia appeared healthy and to simplify wound care as I left this intact.  I did slightly open the lower perineal incision to measure 6 x 2 x 1 cm.  A Penrose  drain was placed between these 2 wounds beneath the skin on the right labia.  We then packed the wounds with Betadine soaked Kerlix.  The patient tolerated the procedure well.  There were no immediately apparent complications    Jeremy Lyons MD  General Surgeon  [facility]  Surgery [address] [address]?  Office: [phone]

## 2025-04-02 NOTE — OR NURSING
"Pt spoke with sister on phone. Voice hoarse but clearly communicating appropriately with short phrases \"thank you\"  \"I love you too!\".  Following commands.  Sats on high flow 50liters 92-95%.  Patient able to cough and clear secretions when sats are 92% which results in sats 95%.  RR 22-24.  Denies pain. Transferred to  with non rebreather , maintained sats 100% on 10 liters  "

## 2025-04-02 NOTE — PROGRESS NOTES
ICU Daily Note (04/02/2025)     Date of Hospital Admission: 3/31/2025  Date of ICU Admission: 3/31/2025  Code Status: Full Code    Reason for Visit  Acute on chronic respiratory failure. Sepsis due to reji anal abscess.    Summary  58 yo F past medicine history of intellectual disability, COPD-emphysema with severe obstruction & air-trapping, active tobacco smoker, FVL evidence of fixed upper airway obstruction, obesity, ADEEL with PAP nonadherence, secondary polycythemia, HTN, T2DM, & CKD 3. Recent admission at  [facility] 3/18 - 3/20/22 for DKA, hypoxemic respiratory insufficiency & condylomata acuminata in the setting of medication & PAP nonadherence or inability to care for self; discharged with insulin pump, 2 LPM NC O2, & acyclovir     Presented 3/22/25 to [facility] ED for chest pain, dyspnea, & reduced level of consciousness. Found to have acute toxic-metabolic encephalopathy, DKA, AF RVR, preserved biventricular systolic function, distributive shock, acute hypoxemic-hypercapnic respiratory failure requiring invasive ventilation (3/23 - 4/1/25), acute exacerbation of COPD, segmental & subsegmental PE, right lower lobe atelectasis without evidence of mucus plugging (3/31/25 bronchoscopy), ventilator associated pneumonia, reji-anal abscess s/p I&D with penrose drain placement (3/31), & non-oliguric YADIRA    Subjective  Seen and examined in the ICU. Awake. On high flow. Briefly on Levophed this morning.     Objective   Vital Signs  Blood pressure (!) 143/70, pulse 86, temperature 98.1  F (36.7  C), temperature source Oral, resp. rate 26, weight 100.4 kg (221 lb 5.5 oz), SpO2 (!) 90%.  I/O last 3 completed shifts:  In: 1140.51 [I.V.:1125.51; IV Piggyback:15]  Out: 4220 [Urine:4195; Emesis/NG output:25]  FiO2 (%): (S) 35 %, Resp: 26, Vent Mode: CPAP/PS, Resp Rate (Set): 20 breaths/min, Tidal Volume (Set, mL): 450 mL, PEEP (cm H2O): 5 cmH2O, Pressure Support (cm H2O): 5 cmH2O, Resp Rate (Set): 20 breaths/min,  Tidal Volume (Set, mL): 450 mL, PEEP (cm H2O): 5 cmH2O    Physical Exam   Awake. Comfortable.  Lungs slightly diminished   Abdomen soft  Extremities: Anasarca     Diagnostic Data  The following portions of the patient's chart were reviewed: current medications, problem list, laboratory workup, and diagnostic data.    Most Recent Pertinent Labs  Lab Results   Component Value Date    WBC 11.8 (H) 04/02/2025    WBC 11.8 (H) 04/02/2025    HGB 12.3 04/02/2025    HCT 36.6 04/02/2025     04/02/2025     04/02/2025    POTASSIUM 3.9 04/02/2025    CHLORIDE 101 04/02/2025    CO2 29 04/02/2025    BUN 32.5 (H) 04/02/2025    CR 1.58 (H) 04/02/2025     (H) 04/02/2025    NTBNPI 2,201 (H) 03/23/2025    AST 25 03/31/2025    ALT 16 03/31/2025    ALKPHOS 62 03/31/2025    INR 1.24 (H) 03/31/2025     Infusion Medications  Current Facility-Administered Medications   Medication Dose Route Frequency Provider Last Rate Last Admin    dextrose 10% infusion   Intravenous Continuous PRN Nathaniel Coreas MD        dextrose 10% infusion   Intravenous Continuous PRN Anjali Segura MD        heparin 25,000 units in 0.45% NaCl 250 mL ANTICOAGULANT infusion  0-5,000 Units/hr Intravenous Continuous Anjali Segura MD 14.5 mL/hr at 04/02/25 0600 1,450 Units/hr at 04/02/25 0600    insulin regular (MYXREDLIN) 1 unit/mL infusion  0-24 Units/hr Intravenous Continuous Anjali Segura MD 1 mL/hr at 04/02/25 0606 1 Units/hr at 04/02/25 0606    norepinephrine (LEVOPHED) 4 mg in  mL infusion PREMIX  0.01-0.6 mcg/kg/min (Dosing Weight) Intravenous Continuous Anjali Segura MD 3.9 mL/hr at 04/02/25 0645 0.01 mcg/kg/min at 04/02/25 0645    Patient is already receiving anticoagulation with heparin, enoxaparin (LOVENOX), warfarin (COUMADIN)  or other anticoagulant medication   Does not apply Continuous PRN Anjali Segura MD         Scheduled Medications  Current Facility-Administered Medications   Medication  "Dose Route Frequency Provider Last Rate Last Admin    albuterol (PROVENTIL) neb solution 2.5 mg  2.5 mg Nebulization Q4H Nathaniel Coreas MD   2.5 mg at 04/02/25 0434    And    sodium chloride (NEBUSAL) 3 % neb solution 3 mL  3 mL Nebulization Q8H Nathaniel Coreas MD   3 mL at 04/02/25 0005    chlorhexidine (PERIDEX) 0.12 % solution 15 mL  15 mL Mouth/Throat Q12H Anjali Segura MD   15 mL at 04/01/25 0757    piperacillin-tazobactam (ZOSYN) 3.375 g vial to attach to  mL bag  3.375 g Intravenous Q8H Anjali Segura MD   3.375 g at 04/02/25 0559    vancomycin (VANCOCIN) 1,000 mg in 200 mL dextrose intermittent infusion  1,000 mg Intravenous Q24H Anjali Segura  mL/hr at 04/01/25 2348 1,000 mg at 04/01/25 2348        Assessment & Plan   Neurology  # Intellectual disability noted in the chart  # Acute metabolic encephalopathy - resolved   Alert & oriented      Cardiovascular:  # Septic shock  # Atrial fibrillation - resolved   Warm extremities. Edematous. 3/24/25 cardiac POCUS LVEF 65-70%, normal RV \"size & structure,\" no valvular disease, no pericardial effusion.   - NE goal MAP>65     Respiratory  # Acute hypoxemic respiratory failure   # Ventilator associated pneumonia, bilateral lower lobe atelectasis   3/31/25 bronchoscopy did not demonstrate mucus plugging. 3/31/25 CT chest demonstrated right middle lobe ground glass opacities, complete right lower lobe & nearly complete left lower lobe atelecatsis &/or consolidation, small left greater than right pleural effusion    - pulmonary hygiene regimen: albuterol nebs q4h, saline nebs q8h, PT/OT, out of bed to chair as able   - antimicrobials & infectious evaluation, as below   - Extubated 4/1.     # Segmental & subsegmental pulmonary emboli   - heparin gtt      # Severe COPD   History of COPD-emphysema with severe obstruction & air-trapping. 1/9/24 FEV 39% predicted, 0.98. FVL evidence of fixed upper airway obstruction; unremarkable " laryngoscopy (11/2024) & no history of bronchoscopy.      # ADEEL PAP nonadherence   - BPAP 22/18 at bedtime per titration 8/16/23      Gastrointestinal:  # Obesity   # Protein calori malnutrition   Normal hepatobiliary indices   - nutrition consulted      Renal, Acid-base, Electrolytes, Volume:  # Non-oliguric YADIRA on CKD  Baseline Cr 1.1 - 1.3   - Anasarca   Likely multifactorial in the setting of prolonged ICU admission, presumed excessive crystalloid administration, malnutrition with low oncotic pressure, ADEEL with fluid retention, et al. No known diastolic dysfunction.   - goal net negative fluid balance, achieved thus far without diuresis      Infectious Disease:  # Ventilator associated pneumonia   # Georgie-anal abscess   WBC & procal downtrending, 3/31/25 CT chest demonstrated right middle lobe ground glass opacities, complete right lower lobe & nearly complete left lower lobe atelecatsis &/or consolidation   - 3/31/25 BAL studies pending   - 3/31 s/p I&D with penrose drain placement. Plan to OR trip again today  - surgery consulted   - pip-tazo (3/23 - current)  - vancomycin (3/31 - current)  - Consult ID. Low threshold to stop vancomycin     Hematology, Oncology:  Leukocytosis secondary to physiologic stress vs sepsis      Endocrine:  T2DM, poorly controlled   A1c 12.7  -  on insulin gtt but will transition to subcutaneous insulin    Critical care time excluding any procedural time is 80 minutes.    Donnie Cotter MD  Pager [phone]  [employer]   Vocera Web Console ([employer]

## 2025-04-03 ENCOUNTER — APPOINTMENT (OUTPATIENT)
Dept: OCCUPATIONAL THERAPY | Facility: HOSPITAL | Age: 60
DRG: 981 | End: 2025-04-03
Attending: PHYSICIAN ASSISTANT
Payer: COMMERCIAL

## 2025-04-03 ENCOUNTER — APPOINTMENT (OUTPATIENT)
Dept: PHYSICAL THERAPY | Facility: HOSPITAL | Age: 60
DRG: 981 | End: 2025-04-03
Attending: STUDENT IN AN ORGANIZED HEALTH CARE EDUCATION/TRAINING PROGRAM
Payer: COMMERCIAL

## 2025-04-03 ENCOUNTER — APPOINTMENT (OUTPATIENT)
Dept: SPEECH THERAPY | Facility: HOSPITAL | Age: 60
DRG: 981 | End: 2025-04-03
Attending: INTERNAL MEDICINE
Payer: COMMERCIAL

## 2025-04-03 LAB
ANION GAP SERPL CALCULATED.3IONS-SCNC: 16 MMOL/L (ref 7–15)
BACTERIA BLD CULT: NORMAL
BACTERIA BLD CULT: NORMAL
BACTERIA BRONCH: ABNORMAL
BACTERIA BRONCH: ABNORMAL
BACTERIA BRONCH: NORMAL
BASE EXCESS BLDV CALC-SCNC: 0.9 MMOL/L (ref -3–3)
BUN SERPL-MCNC: 30.3 MG/DL (ref 8–23)
C PNEUM DNA SPEC QL NAA+PROBE: NOT DETECTED
CALCIUM SERPL-MCNC: 8.5 MG/DL (ref 8.8–10.4)
CHLORIDE SERPL-SCNC: 101 MMOL/L (ref 98–107)
CREAT SERPL-MCNC: 1.51 MG/DL (ref 0.51–0.95)
EGFRCR SERPLBLD CKD-EPI 2021: 39 ML/MIN/1.73M2
ERYTHROCYTE [DISTWIDTH] IN BLOOD BY AUTOMATED COUNT: 14.4 % (ref 10–15)
GLUCOSE BLDC GLUCOMTR-MCNC: 130 MG/DL (ref 70–99)
GLUCOSE BLDC GLUCOMTR-MCNC: 136 MG/DL (ref 70–99)
GLUCOSE BLDC GLUCOMTR-MCNC: 141 MG/DL (ref 70–99)
GLUCOSE BLDC GLUCOMTR-MCNC: 144 MG/DL (ref 70–99)
GLUCOSE BLDC GLUCOMTR-MCNC: 156 MG/DL (ref 70–99)
GLUCOSE BLDC GLUCOMTR-MCNC: 173 MG/DL (ref 70–99)
GLUCOSE SERPL-MCNC: 146 MG/DL (ref 70–99)
HCO3 BLDV-SCNC: 27 MMOL/L (ref 21–28)
HCO3 SERPL-SCNC: 24 MMOL/L (ref 22–29)
HCT VFR BLD AUTO: 33.5 % (ref 35–47)
HGB BLD-MCNC: 11 G/DL (ref 11.7–15.7)
L PNEUMO DNA SPEC QL NAA+PROBE: NOT DETECTED
LEGIONELLA DNA SPEC NAA+PROBE: NOT DETECTED
M PNEUMO DNA SPEC QL NAA+PROBE: NOT DETECTED
MAGNESIUM SERPL-MCNC: 2 MG/DL (ref 1.7–2.3)
MCH RBC QN AUTO: 30.6 PG (ref 26.5–33)
MCHC RBC AUTO-ENTMCNC: 32.8 G/DL (ref 31.5–36.5)
MCV RBC AUTO: 93 FL (ref 78–100)
O2/TOTAL GAS SETTING VFR VENT: 50 %
OXYHGB MFR BLDV: 92 % (ref 70–75)
PCO2 BLDV: 47 MM HG (ref 40–50)
PH BLDV: 7.37 [PH] (ref 7.32–7.43)
PHOSPHATE SERPL-MCNC: 4.6 MG/DL (ref 2.5–4.5)
PLATELET # BLD AUTO: 373 10E3/UL (ref 150–450)
PO2 BLDV: 67 MM HG (ref 25–47)
POTASSIUM SERPL-SCNC: 4.3 MMOL/L (ref 3.4–5.3)
RBC # BLD AUTO: 3.59 10E6/UL (ref 3.8–5.2)
SAO2 % BLDV: 93 % (ref 70–75)
SCANNED LAB RESULT: NORMAL
SODIUM SERPL-SCNC: 141 MMOL/L (ref 135–145)
UFH PPP CHRO-ACNC: 0.19 IU/ML
UFH PPP CHRO-ACNC: 0.36 IU/ML
UFH PPP CHRO-ACNC: 0.42 IU/ML
WBC # BLD AUTO: 11.3 10E3/UL (ref 4–11)

## 2025-04-03 PROCEDURE — 97530 THERAPEUTIC ACTIVITIES: CPT | Mod: GP

## 2025-04-03 PROCEDURE — 85520 HEPARIN ASSAY: CPT | Performed by: SURGERY

## 2025-04-03 PROCEDURE — 250N000011 HC RX IP 250 OP 636: Mod: JZ | Performed by: PHYSICIAN ASSISTANT

## 2025-04-03 PROCEDURE — 999N000156 HC STATISTIC RCP CONSULT EA 30 MIN

## 2025-04-03 PROCEDURE — 250N000009 HC RX 250: Performed by: STUDENT IN AN ORGANIZED HEALTH CARE EDUCATION/TRAINING PROGRAM

## 2025-04-03 PROCEDURE — 250N000011 HC RX IP 250 OP 636: Performed by: INTERNAL MEDICINE

## 2025-04-03 PROCEDURE — 94761 N-INVAS EAR/PLS OXIMETRY MLT: CPT

## 2025-04-03 PROCEDURE — 999N000157 HC STATISTIC RCP TIME EA 10 MIN

## 2025-04-03 PROCEDURE — 84100 ASSAY OF PHOSPHORUS: CPT | Performed by: PHYSICIAN ASSISTANT

## 2025-04-03 PROCEDURE — 94640 AIRWAY INHALATION TREATMENT: CPT

## 2025-04-03 PROCEDURE — 85027 COMPLETE CBC AUTOMATED: CPT | Performed by: PHYSICIAN ASSISTANT

## 2025-04-03 PROCEDURE — 200N000001 HC R&B ICU

## 2025-04-03 PROCEDURE — 250N000012 HC RX MED GY IP 250 OP 636 PS 637: Performed by: INTERNAL MEDICINE

## 2025-04-03 PROCEDURE — 94660 CPAP INITIATION&MGMT: CPT

## 2025-04-03 PROCEDURE — 85520 HEPARIN ASSAY: CPT | Performed by: INTERNAL MEDICINE

## 2025-04-03 PROCEDURE — 250N000013 HC RX MED GY IP 250 OP 250 PS 637: Performed by: PHYSICIAN ASSISTANT

## 2025-04-03 PROCEDURE — 99292 CRITICAL CARE ADDL 30 MIN: CPT | Performed by: INTERNAL MEDICINE

## 2025-04-03 PROCEDURE — 97162 PT EVAL MOD COMPLEX 30 MIN: CPT | Mod: GP

## 2025-04-03 PROCEDURE — 94799 UNLISTED PULMONARY SVC/PX: CPT

## 2025-04-03 PROCEDURE — 82435 ASSAY OF BLOOD CHLORIDE: CPT | Performed by: PHYSICIAN ASSISTANT

## 2025-04-03 PROCEDURE — 92610 EVALUATE SWALLOWING FUNCTION: CPT | Mod: GN

## 2025-04-03 PROCEDURE — 82565 ASSAY OF CREATININE: CPT | Performed by: PHYSICIAN ASSISTANT

## 2025-04-03 PROCEDURE — 83735 ASSAY OF MAGNESIUM: CPT | Performed by: PHYSICIAN ASSISTANT

## 2025-04-03 PROCEDURE — 80048 BASIC METABOLIC PNL TOTAL CA: CPT | Performed by: PHYSICIAN ASSISTANT

## 2025-04-03 PROCEDURE — 94640 AIRWAY INHALATION TREATMENT: CPT | Mod: 76

## 2025-04-03 PROCEDURE — 99024 POSTOP FOLLOW-UP VISIT: CPT

## 2025-04-03 PROCEDURE — 99232 SBSQ HOSP IP/OBS MODERATE 35: CPT | Performed by: INTERNAL MEDICINE

## 2025-04-03 PROCEDURE — 97167 OT EVAL HIGH COMPLEX 60 MIN: CPT | Mod: GO

## 2025-04-03 PROCEDURE — 82805 BLOOD GASES W/O2 SATURATION: CPT | Performed by: INTERNAL MEDICINE

## 2025-04-03 PROCEDURE — 99291 CRITICAL CARE FIRST HOUR: CPT | Performed by: INTERNAL MEDICINE

## 2025-04-03 PROCEDURE — 250N000013 HC RX MED GY IP 250 OP 250 PS 637: Performed by: INTERNAL MEDICINE

## 2025-04-03 PROCEDURE — 97535 SELF CARE MNGMENT TRAINING: CPT | Mod: GO

## 2025-04-03 RX ORDER — MAGNESIUM SULFATE HEPTAHYDRATE 40 MG/ML
2 INJECTION, SOLUTION INTRAVENOUS ONCE
Status: COMPLETED | OUTPATIENT
Start: 2025-04-03 | End: 2025-04-03

## 2025-04-03 RX ADMIN — ACETAMINOPHEN 975 MG: 325 TABLET ORAL at 21:07

## 2025-04-03 RX ADMIN — PIPERACILLIN AND TAZOBACTAM 3.38 G: 3; .375 INJECTION, POWDER, FOR SOLUTION INTRAVENOUS at 15:23

## 2025-04-03 RX ADMIN — SODIUM CHLORIDE 30 MG/ML INHALATION SOLUTION 3 ML: 30 SOLUTION INHALANT at 00:28

## 2025-04-03 RX ADMIN — ALBUTEROL SULFATE 2.5 MG: 2.5 SOLUTION RESPIRATORY (INHALATION) at 04:07

## 2025-04-03 RX ADMIN — INSULIN ASPART 1 UNITS: 100 INJECTION, SOLUTION INTRAVENOUS; SUBCUTANEOUS at 00:00

## 2025-04-03 RX ADMIN — INSULIN ASPART 1 UNITS: 100 INJECTION, SOLUTION INTRAVENOUS; SUBCUTANEOUS at 09:00

## 2025-04-03 RX ADMIN — ALBUTEROL SULFATE 2.5 MG: 2.5 SOLUTION RESPIRATORY (INHALATION) at 19:52

## 2025-04-03 RX ADMIN — SODIUM CHLORIDE 30 MG/ML INHALATION SOLUTION 3 ML: 30 SOLUTION INHALANT at 07:55

## 2025-04-03 RX ADMIN — HYDROMORPHONE HYDROCHLORIDE 0.2 MG: 0.2 INJECTION, SOLUTION INTRAMUSCULAR; INTRAVENOUS; SUBCUTANEOUS at 14:24

## 2025-04-03 RX ADMIN — CHLORHEXIDINE GLUCONATE 15 ML: 1.2 SOLUTION ORAL at 20:46

## 2025-04-03 RX ADMIN — ONDANSETRON 4 MG: 2 INJECTION, SOLUTION INTRAMUSCULAR; INTRAVENOUS at 14:25

## 2025-04-03 RX ADMIN — SODIUM CHLORIDE 30 MG/ML INHALATION SOLUTION 3 ML: 30 SOLUTION INHALANT at 15:09

## 2025-04-03 RX ADMIN — PIPERACILLIN AND TAZOBACTAM 3.38 G: 3; .375 INJECTION, POWDER, FOR SOLUTION INTRAVENOUS at 00:48

## 2025-04-03 RX ADMIN — HEPARIN SODIUM 1600 UNITS/HR: 10000 INJECTION, SOLUTION INTRAVENOUS at 13:54

## 2025-04-03 RX ADMIN — PIPERACILLIN AND TAZOBACTAM 3.38 G: 3; .375 INJECTION, POWDER, FOR SOLUTION INTRAVENOUS at 09:00

## 2025-04-03 RX ADMIN — INSULIN GLARGINE 10 UNITS: 100 INJECTION, SOLUTION SUBCUTANEOUS at 20:47

## 2025-04-03 RX ADMIN — ALBUTEROL SULFATE 2.5 MG: 2.5 SOLUTION RESPIRATORY (INHALATION) at 15:08

## 2025-04-03 RX ADMIN — HYDROMORPHONE HYDROCHLORIDE 0.2 MG: 0.2 INJECTION, SOLUTION INTRAMUSCULAR; INTRAVENOUS; SUBCUTANEOUS at 14:30

## 2025-04-03 RX ADMIN — INSULIN ASPART 1 UNITS: 100 INJECTION, SOLUTION INTRAVENOUS; SUBCUTANEOUS at 04:30

## 2025-04-03 RX ADMIN — INSULIN ASPART 1 UNITS: 100 INJECTION, SOLUTION INTRAVENOUS; SUBCUTANEOUS at 12:52

## 2025-04-03 RX ADMIN — ALBUTEROL SULFATE 2.5 MG: 2.5 SOLUTION RESPIRATORY (INHALATION) at 00:28

## 2025-04-03 RX ADMIN — MAGNESIUM SULFATE HEPTAHYDRATE 2 G: 40 INJECTION, SOLUTION INTRAVENOUS at 06:29

## 2025-04-03 RX ADMIN — ALBUTEROL SULFATE 2.5 MG: 2.5 SOLUTION RESPIRATORY (INHALATION) at 11:29

## 2025-04-03 RX ADMIN — CHLORHEXIDINE GLUCONATE 15 ML: 1.2 SOLUTION ORAL at 09:00

## 2025-04-03 RX ADMIN — ALBUTEROL SULFATE 2.5 MG: 2.5 SOLUTION RESPIRATORY (INHALATION) at 07:55

## 2025-04-03 ASSESSMENT — ACTIVITIES OF DAILY LIVING (ADL)
ADLS_ACUITY_SCORE: 81
ADLS_ACUITY_SCORE: 79
ADLS_ACUITY_SCORE: 81
ADLS_ACUITY_SCORE: 79
ADLS_ACUITY_SCORE: 81
ADLS_ACUITY_SCORE: 81
ADLS_ACUITY_SCORE: 79

## 2025-04-03 NOTE — PLAN OF CARE
[facility] - ICU    RN Progress Note:            Pertinent Assessments:      Please refer to flowsheet rows for full assessment     - Alert but disoriented to situation at times.  - Desaturation (81%) on high flow nasal cannula             Key Events - This Shift:       - placed on BiPAP mask at around 0915H and then placed back on high flow nasal cannula.  - Referred to speech therapy for aspiration risk evaluation.   MEDS NEEDS TO BE CRUSHED AND MIXED WITH PUDDING, NEEDS TO GIVE THE MEDS ONE AT A TIME, AS PER SPEECH THERAPIST  - right groin dressing changed by surgery PA, dilaudid 0.4 mg IV prior             RN Managed Protocols Ordered:  Yes  Protocols:Potassium, Magnesium, Phosphorus, and Heparin  Protocols Status: Endorsed to Oncoming RN                Barriers to Discharge / Downgrade:     - oxygen needs         Point of Contact Update: Name: Mary  Phone Number: in file  Summary of Conversation: updated regarding the oxygenation and plan of care

## 2025-04-03 NOTE — PROGRESS NOTES
ICU Daily Note (04/03/2025)     Date of Hospital Admission: 3/31/2025  Date of ICU Admission: 3/31/2025  Code Status: Full Code    Reason for Visit  Acute on chronic respiratory failure. Sepsis due to reji anal abscess.    Summary  60 yo F past medicine history of intellectual disability, COPD-emphysema with severe obstruction & air-trapping, active tobacco smoker, FVL evidence of fixed upper airway obstruction, obesity, ADEEL with PAP nonadherence, secondary polycythemia, HTN, T2DM, & CKD 3. Recent admission at  [facility] 3/18 - 3/20/22 for DKA, hypoxemic respiratory insufficiency & condylomata acuminata in the setting of medication & PAP nonadherence or inability to care for self; discharged with insulin pump, 2 LPM NC O2, & acyclovir     Presented 3/22/25 to [facility] ED for chest pain, dyspnea, & reduced level of consciousness. Found to have acute toxic-metabolic encephalopathy, DKA, AF RVR, preserved biventricular systolic function, distributive shock, acute hypoxemic-hypercapnic respiratory failure requiring invasive ventilation (3/23 - 4/1/25), acute exacerbation of COPD, segmental & subsegmental PE, right lower lobe atelectasis without evidence of mucus plugging (3/31/25 bronchoscopy), ventilator associated pneumonia, reji-anal abscess s/p I&D with penrose drain placement (3/31), & non-oliguric YADIRA    Subjective  Seen and examined in the ICU. Awake. On high flow. Underwent Incision and drainage of perineum and right groin yesterday.    Objective   Vital Signs  Blood pressure 98/52, pulse 80, temperature 98.8  F (37.1  C), temperature source Axillary, resp. rate 19, weight 98.9 kg (218 lb 0.6 oz), SpO2 (!) 91%.  I/O last 3 completed shifts:  In: 1376.76 [I.V.:1256.76; IV Piggyback:120]  Out: 2465 [Urine:2415; Blood:50]  FiO2 (%): 40 %, Resp: 19    Physical Exam   Awake. Comfortable.  Lungs slightly diminished   Abdomen soft  Extremities: Anasarca     Diagnostic Data  The following portions of the  patient's chart were reviewed: current medications, problem list, laboratory workup, and diagnostic data.    CT Pelvis 3/31                                                              IMPRESSION: Right inguinal hernia containing fat.  Worsening subcutaneous edema and edema in the perineum and labia on  the right as compared to previous examination.  No discrete abscess is seen.     Most Recent Pertinent Labs  Lab Results   Component Value Date    WBC 11.3 (H) 04/03/2025    HGB 11.0 (L) 04/03/2025    HCT 33.5 (L) 04/03/2025     04/03/2025     04/03/2025    POTASSIUM 4.3 04/03/2025    CHLORIDE 101 04/03/2025    CO2 24 04/03/2025    BUN 30.3 (H) 04/03/2025    CR 1.51 (H) 04/03/2025     (H) 04/03/2025    NTBNPI 2,201 (H) 03/23/2025    AST 25 03/31/2025    ALT 16 03/31/2025    ALKPHOS 62 03/31/2025    INR 1.24 (H) 03/31/2025     Infusion Medications  Current Facility-Administered Medications   Medication Dose Route Frequency Provider Last Rate Last Admin    dextrose 10% infusion   Intravenous Continuous PRN Corey, Tamara, PA-C        dextrose 10% infusion   Intravenous Continuous PRN CoreyJericaTamara, PA-C        heparin 25,000 units in 0.45% NaCl 250 mL ANTICOAGULANT infusion  0-5,000 Units/hr Intravenous Continuous CoreyJuan JoséTamara, PA-C 16 mL/hr at 04/03/25 0224 1,600 Units/hr at 04/03/25 0224    norepinephrine (LEVOPHED) 4 mg in  mL infusion PREMIX  0.01-0.6 mcg/kg/min (Dosing Weight) Intravenous Continuous CoreyJuan JoséTamara, PA-C   Stopped at 04/03/25 0638    Patient is already receiving anticoagulation with heparin, enoxaparin (LOVENOX), warfarin (COUMADIN)  or other anticoagulant medication   Does not apply Continuous PRN Corey, Tamara, PA-C         Scheduled Medications  Current Facility-Administered Medications   Medication Dose Route Frequency Provider Last Rate Last Admin    acetaminophen (TYLENOL) tablet 975 mg  975 mg Oral Q8H CoreyTamara, PA-C        albuterol  "(PROVENTIL) neb solution 2.5 mg  2.5 mg Nebulization Q4H Nathaniel Coreas MD   2.5 mg at 04/03/25 0407    And    sodium chloride (NEBUSAL) 3 % neb solution 3 mL  3 mL Nebulization Q8H Nathaniel Coreas MD   3 mL at 04/03/25 0028    chlorhexidine (PERIDEX) 0.12 % solution 15 mL  15 mL Mouth/Throat Q12H Anjali Segura MD   15 mL at 04/02/25 0822    insulin aspart (NovoLOG) injection (RAPID ACTING)  1-7 Units Subcutaneous Q4H DALIA Donnie Cotter MD   1 Units at 04/03/25 0430    insulin glargine (LANTUS PEN) injection 10 Units  10 Units Subcutaneous At Bedtime Donnie Cotter MD   10 Units at 04/02/25 2313    piperacillin-tazobactam (ZOSYN) 3.375 g vial to attach to  mL bag  3.375 g Intravenous Q8H Anjali Segura MD   3.375 g at 04/03/25 0048    polyethylene glycol (MIRALAX) Packet 17 g  17 g Oral Daily Tamara Corey PA-C        senna-docusate (SENOKOT-S/PERICOLACE) 8.6-50 MG per tablet 1 tablet  1 tablet Oral BID Tamara Corey PA-C        sodium chloride (PF) 0.9% PF flush 3 mL  3 mL Intracatheter Q8H Tamara Corey PA-C   3 mL at 04/03/25 0002    vancomycin (VANCOCIN) 1,000 mg in 200 mL dextrose intermittent infusion  1,000 mg Intravenous Q24H Anjali Segura  mL/hr at 04/02/25 2313 1,000 mg at 04/02/25 2313       Assessment & Plan   Neurology  # Intellectual disability noted in the chart  # Acute metabolic encephalopathy - resolved   Alert & oriented      Cardiovascular:  # Septic shock  # Atrial fibrillation - resolved   Warm extremities. Edematous. 3/24/25 cardiac POCUS LVEF 65-70%, normal RV \"size & structure,\" no valvular disease, no pericardial effusion.   - Titrate NE goal MAP>65     Respiratory  # Acute hypoxemic respiratory failure   # Ventilator associated pneumonia, bilateral lower lobe atelectasis   3/31/25 bronchoscopy did not demonstrate mucus plugging. 3/31/25 CT chest demonstrated right middle lobe ground glass opacities, complete right lower lobe & nearly complete " left lower lobe atelecatsis &/or consolidation, small left greater than right pleural effusion    - pulmonary hygiene regimen: albuterol nebs q4h, saline nebs q8h, PT/OT, out of bed to chair as able   - antimicrobials & infectious evaluation, as below   - Extubated 4/1.   - Titrate high flow for O2 saturation of 90%    # Segmental & subsegmental pulmonary emboli   - heparin gtt      # Severe COPD   History of COPD-emphysema with severe obstruction & air-trapping. 1/9/24 FEV 39% predicted, 0.98. FVL evidence of fixed upper airway obstruction; unremarkable laryngoscopy (11/2024) & no history of bronchoscopy.      # ADEEL PAP nonadherence   - BPAP 22/18 at bedtime per titration 8/16/23      Gastrointestinal:  # Obesity   # Protein calori malnutrition   Normal hepatobiliary indices   - nutrition consulted  - Encourage oral intake.     Renal, Acid-base, Electrolytes, Volume:  # Non-oliguric YADIRA on CKD  Baseline Cr 1.1 - 1.3   - Anasarca   Likely multifactorial in the setting of prolonged ICU admission, presumed excessive crystalloid administration, malnutrition with low oncotic pressure, ADEEL with fluid retention, et al. No known diastolic dysfunction.   - goal net negative fluid balance, achieved thus far without diuresis      Infectious Disease:  # Ventilator associated pneumonia   # Georgie-anal abscess   WBC & procal downtrending, 3/31/25 CT chest demonstrated right middle lobe ground glass opacities, complete right lower lobe & nearly complete left lower lobe atelecatsis &/or consolidation   - 3/31/25 BAL studies negative so far.  - 3/31 s/p I&D with penrose drain placement. OR gain on 4/2 for Incision and drainage of perineum and right groin   - pip-tazo (3/23 - current)  - vancomycin (3/31 - current)  - BC 4/1 negative so far.  - Low threshold to stop vancomycin, but will discuss with ID  - Surgery and ID are following.      Hematology, Oncology:  Leukocytosis secondary to physiologic stress vs sepsis       Endocrine:  T2DM, poorly controlled   A1c 12.7  -  Lantus and subcutaneous insulin    Critical care time excluding any procedural time is 80 minutes.    Donnie Cotter MD  Pager [phone]  [employer] ([employer])   Vocera Web Console ([employer])

## 2025-04-03 NOTE — PROGRESS NOTES
"Care Management Follow Up    Length of Stay (days): 3    Expected Discharge Date: 04/08/2025     Concerns to be Addressed:     discharge planning, medical progression  Patient plan of care discussed at interdisciplinary rounds: Yes    Anticipated Discharge Disposition:  TBD              Anticipated Discharge Services:  TBD  Anticipated Discharge DME:  TBD    Patient/family educated on Medicare website which has current facility and service quality ratings:    Education Provided on the Discharge Plan:    Patient/Family in Agreement with the Plan:      Referrals Placed by CM/PHILOMENA:    Private pay costs discussed: Not applicable    Discussed  Partnership in Safe Discharge Planning  document with patient/family: No     Handoff Completed: No, handoff not indicated or clinically appropriate    Additional Information:  Social Hx:   \"Patient lives in an apartment with her significant other Tavo. They have been together for 18 years or more. Patient has mild cognitive disability.  She is independent with ADLs and most IADLS, except money management, medications set up and driving.  She ambulates with no devices and will walk or take the bus for transportation. She is retired from working. She has home RN weekly visits for medication set up through [facility].     Per [facility], patient does not have HCD designating health agent but Tavo would know her best for decision making. \"    4/3:  Nelly called.  Nelly relied concerns that patient's landlord is demanding her rent that was due at the start of the month, or patient may face eviction.   Per Arin, patient is the only person who has access to her bank account.   Arin asked about getting a financial POA, and PANKAJ explained hospital does not have on-site FPOA notary, though mobile notary cost is $60 plus $5 per page.  Nelly reports patient does not have funds for this and waiver would not cover this cost.  PANKAJ explained patient is alert and oriented " and on high flow oxygen, and could likely get access to her bank or write a check if someone came to the hospital to assist.   Nelly is going to sort something out.      [facility] SW: Nelly [phone].      Next Steps: CM will continue to monitor medical progression, follow treatment team recommendations, and aid in discharge planning.        Beti Wilkes RN

## 2025-04-03 NOTE — INTERIM SUMMARY
Verbal signout  59-year-old female patient was admitted to ICU due to sepsis and acute respiratory failure secondary to perianal abscess. S/p I&D on 4/2.  Surgery and ID are on board.  Patient uses BiPAP at night at home.    Plan  Will assume care as patient downgraded from ICU    Noam Schneider MD

## 2025-04-03 NOTE — PROGRESS NOTES
General Surgery Progress Note:    Hospital Day # 3    ASSESSMENT:  1. Pressure injury of buttock, stage 2, unspecified laterality (H) [L89.302]    2. Perineal abscess        Marly Cannon is a 59 year old female PMH COPD on home O2, DM2 12.7% 3/22, recently admitted for DKA and acute hypoxic respiratory failure with LL lobe PE, complicated by VAP, DKA, chronic decubitus ulcer s/p bedside I&D and Penrose placement 3/31 s/p incision and drainage of NSTI 4/2 with Penrose placements. Patient is on 40 LPM, wound bed looks fibrinous but otherwise without necrosis or other decompensation. Continue with wound cares and will evaluate daily    PLAN:  -Okay for SLP eval and subsequent diet per surgical perspective; if decompensation of wound place to NPO  -Appreciate excellent ICU cares and keeping area clean and dry  -Continue with changing dressing over top BID and PRN / as soiled  -BID Vashe moistened gauze packing changes; first usually by surgery and second by nursing; keep penrose in place. Be sure to tuck gauze into tunneling.  -Will continue to eval daily for wound changes and need for further debridement  -Antibiotics per ID  -Medical management per primary team    SUBJECTIVE:   Marly Cannon was seen on rounds. She does not respond much to my questioning. She does state she is hungry currently and wanting to eat.     VITALS RANGE:  Temp:  [98.1  F (36.7  C)-99.3  F (37.4  C)] 98.1  F (36.7  C)  Pulse:  [76-93] 82  Resp:  [17-40] 19  BP: ()/(51-66) 121/56  MAP:  [60 mmHg-322 mmHg] 66 mmHg  Arterial Line BP: (-2-322)/(-2-321) 78/54  FiO2 (%):  [10 %-100 %] 65 %  SpO2:  [79 %-100 %] 96 %    PHYSICAL EXAM:  General: patient seen resting in bed in no acute distress though wincing at times with the dressing change  Resp: no increased work of breathing, breathing comfortably on room air  Abdomen: generally soft nontender  Groin: right groin area with serosanguinous output on the kerlix; generally fibrinous tissue  on the base of the wound and tissue margins / skin appears viable and without necrosis or purulent / other drainage. Wound tunnels approximately 8 cm lateral to the groin incision but appears clean with the Qtip. Some small tunneling adjacent just superior to this as well.   Undermining superiorly but without further tunneling on my exam.   Penrose secure and labia remains firmly indurated, bloody with removal of packing and quite tender but otherwise fibrinous and yellow appearing but non purulent on Qtip exam. Unable to flush through the penrose tunnel but able to move the penrose.   Did not visualize any stool in the area currently.  Extremities: No edema or cyanosis visualized on exam, no obvious deformities    04/02 0700 - 04/03 0659  In: 1376.76 [I.V.:1256.76]  Out: 2465 [Urine:2415]    No results displayed because visit has over 200 results.           ANNETTE Dudley [facility]  [address] [place] [address] [id number] [place] [address] [phone]

## 2025-04-03 NOTE — PROGRESS NOTES
INFECTIOUS DISEASE FOLLOW UP NOTE  Date: 04/03/2025     ================================================================  SUBJECTIVE  No events    ================================================================  OBJECTIVE  BP 98/52   Pulse 86   Temp 98.8  F (37.1  C) (Axillary)   Resp 19   Wt 98.9 kg (218 lb 0.6 oz)   SpO2 93%   BMI 36.28 kg/m      Physical Exam  General: lethargic, no apparent distress  HEENT: atraumatic, normocephalic, no scleral icterus, moist mucus membranes, no cervical lymphadenopathy  Heart: Normal rate, regular rhythm  Lungs: good inspiratory effort  Abdomen: soft, non-tender, non-distended  Extremities: no peripheral edema. Buttock ulcer per below      Pertinent labs  CBC RESULTS:   Recent Labs   Lab Test 04/03/25  0417   WBC 11.3*   RBC 3.59*   HGB 11.0*   HCT 33.5*   MCV 93   MCH 30.6   MCHC 32.8   RDW 14.4           Imaging  3/22 CTA chest  1.  Very small pulmonary artery emboli to subsegmental branches of the left lower lobe. No right heart strain.  2.  Mild emphysematous changes of the lungs. No focal pulmonary consolidation or pleural effusion.  3.  Trace pericardial effusion.  4.  Small sliding-type hiatal hernia.     3/24 CTA chest  Exam positive for pulmonary embolism. There has been progression of emboli as seen previously. There is now involvement a segmental and subsegmental arteries of the right lower lobe. There is  now atelectasis of the entire right lower lobe with volume loss. There is some abnormal enhancement within portions of the right lower lobe which may be sequela of infarction/ischemia. There is opacification of bronchi within the right lower lobe. Mucous plugging may be present.12     3/24 CT A/P  There is gallbladder distention. A small amount of gallbladder sludge is possible but no calcified gallstones are seen and there is no biliary dilatation. There is diffuse subcutaneous edema. Small right inguinal hernia containing fat. No loops of bowel are  involved.     3/31 CT pelvis  Right inguinal hernia containing fat. Worsening subcutaneous edema and edema in the perineum and labia on the right as compared to previous examination. No discrete abscess is seen.     3/31 CT chest  1.  Complete right and near complete left lower lobe collapse.  2.  Mild groundglass density in the right middle lobe, nonspecific.        Microbiology data  3/17 flu/covid/rsv: negative  3/17 blood: NG  3/22 blood: NG  3/24 MRSA nares: negative  3/26 sputum culture: candida, GPCs  3/31 serum galactomannan: negative  3/31 beta-D glucan: normal  3/31 BAL              49 PMNs, 11% PMNs, 30% monocytes, 59% lining cells              Culture: NGTD              AFB culture: NGTD              Histoplasma Ag: pending              Nocardia culture: NGTD              Legionella: negative  4/1 blood: NGTD     I have personally reviewed the relevant laboratory, imaging, and microbiology data  ================================================================  Assessment  Marly Cannon is a 59 year old with perianal abscess.  PMH intellectual disability, COPD w/ severe obstruction, active tobacco use, obesity, ADEEL, secondary polycythemia, DM2, CKD3, HTN.     Recently admitted 3/18-3/20 for DKA + respiratory failure + condyloma acuminata.  Was discharged with insulin pump, 2L oxygen, acyclovir.  Presented again on 3/22 with chest pain, shortness of breath, found to have DKA, distributive shock, respiratory failure, PE.  She required intubation, and this hospital course was complicated by VAP and perianal abscess s/p I&D with penrose placement on 3/31.  Chest CT on 3/31 showed bilateral lobe collapse.  She has been on pip-tazo since 3/23.  Underwent BAL on 3/31, studies are pending.      For now will continue broad antibiotics to cover both GI/ dre as well as hospital-associated pulmonary pathogens.  Over the last 7 days some of her clinical parameters have improved (downtrending WBC count, now  extubated), but still significantly inflamed and with collapse of her lower lung lobes means duration of therapy will need to be extended.  In addition she continued to have feculant drainage from her perianal wound, so was taken back to the OR for I&D on 4/2.     Impression  # Perianal abscess              - s/p penrose drain placement 3/31              - s/p I&D 4/2  # Condyloma acuminata              - s/p acyclovir in mid-March  # Acute hypoxic respiratory failure  # RML pneumonia, ventilator associated  # Bilateral lower lobe collapse  # Segmental pulmonary embolus  # Severe COPD  # Poorly controlled DM2 (A1C 12)     ================================================================  Plan  - Stop vancomycin  - Continue pip-tazo  - Trend results from 3/31 BAL  - ID will follow    Jeremy Cannon MD, MPH  [facility]   Office Telephone [phone].  Fax [phone]  [facility] paging     Quality 431: Preventive Care And Screening: Unhealthy Alcohol Use - Screening: Patient screened for unhealthy alcohol use using a single question and scores less than 2 times per year Quality 110: Preventive Care And Screening: Influenza Immunization: Influenza immunization was not ordered or administered, reason not given Detail Level: Detailed Quality 130: Documentation Of Current Medications In The Medical Record: Current Medications Documented Quality 402: Tobacco Use And Help With Quitting Among Adolescents: Patient screened for tobacco and never smoked

## 2025-04-03 NOTE — PROGRESS NOTES
"   04/03/25 1310   Appointment Info   Signing Clinician's Name / Credentials (OT) Lexus Fernández, OTR/L   Living Environment   People in Home significant other   Current Living Arrangements apartment   Self-Care   Equipment Currently Used at Home none   Activity/Exercise/Self-Care Comment Per chart review, independent with ADLs at baseline.   Instrumental Activities of Daily Living (IADL)   IADL Comments Per chart, pt needs assist at baseline with medication management, driving, and finances.   General Information   Onset of Illness/Injury or Date of Surgery 03/31/25   Referring Physician Gentry Saez, DO   Patient/Family Therapy Goal Statement (OT) none stated   Additional Occupational Profile Info/Pertinent History of Current Problem Per chart review, pt \"is 58 yo F past medicine history of intellectual disability, COPD-emphysema with severe obstruction & air-trapping, active tobacco smoker, FVL evidence of fixed upper airway obstruction, obesity, ADEEL with PAP nonadherence, secondary polycythemia, HTN, T2DM, & CKD 3. Recent admission at  [facility] 3/18 - 3/20/22 for DKA, hypoxemic respiratory insufficiency & condylomata acuminata in the setting of medication & PAP nonadherence or inability to care for self; discharged with insulin pump, 2 LPM NC O2, & acyclovir. Presented 3/22/25 to [facility] ED for chest pain, dyspnea, & reduced level of consciousness. Found to have acute toxic-metabolic encephalopathy, DKA, AF RVR, preserved biventricular systolic function, distributive shock, acute hypoxemic-hypercapnic respiratory failure requiring invasive ventilation (3/23 - 4/1/25), acute exacerbation of COPD, segmental & subsegmental PE, right lower lobe atelectasis without evidence of mucus plugging (3/31/25 bronchoscopy), ventilator associated pneumonia, reji-anal abscess s/p I&D with penrose drain placement (3/31), & non-oliguric YADIRA\"   Existing Precautions/Restrictions fall;oxygen therapy device and L/min  (at " start of eval, pt on 6 LPM via NC)   Limitations/Impairments safety/cognitive  (baseline MCI per chart)   Cognitive Status Examination   Cognitive Status Comments Pt aware she is in the hospital, disoriented to specific place/time/situation. Pt lethargic and slow response time during eval.   Pain Assessment   Patient Currently in Pain No   Range of Motion Comprehensive   Comment, General Range of Motion Limited functional AROM in BUEs observed   Strength Comprehensive (MMT)   Comment, General Manual Muscle Testing (MMT) Assessment Significant generalized weakness noted functionally.   Bed Mobility   Bed Mobility scooting/bridging;supine-sit;sit-supine;rolling left   Rolling Left [place] (Bed Mobility) dependent (less than 25% patient effort)   Scooting/Bridging [place] (Bed Mobility) dependent (less than 25% patient effort);2 person assist   Supine-Sit [place] (Bed Mobility) dependent (less than 25% patient effort)   Sit-Supine [place] (Bed Mobility) dependent (less than 25% patient effort)   Assistive Device (Bed Mobility) bed rails;draw sheet   Transfers   Transfer Comments Per clinical reasoning, pt would need andres lift for transfers at this time.   Balance   Balance Comments Max Ax2 for unsupported sitting balance at EOB using draw sheet.   Activities of Daily Living   BADL Assessment/Intervention upper body dressing;lower body dressing;grooming;toileting   Upper Body Dressing Assessment/Training   Comment, (Upper Body Dressing) Per clinical reasoning, will be impacted by BUE weakness and low activity tolerance.   [place] Level (Upper Body Dressing) not tested   Lower Body Dressing Assessment/Training   Comment, (Lower Body Dressing) Per clinical reasoning, will be impacted by BUE weakness and low activity tolerance.   [place] Level (Lower Body Dressing) not tested   Grooming Assessment/Training   [place] Level (Grooming) not tested   Comment, (Grooming) Per clinical  reasoning, will be impacted by BUE weakness and low activity tolerance.   Toileting   Comment, (Toileting) Per clinical reasoning, will be impacted by BUE weakness and low activity tolerance.   [place] Level (Toileting) not tested   Clinical Impression   Criteria for Skilled Therapeutic Interventions Met (OT) Yes, treatment indicated   OT Diagnosis Impaired ability to perform ADLs, IADLs, and functional mobility.   Influenced by the following impairments acute respiratory failure with hypoxia   OT Problem List-Impairments impacting ADL problems related to;activity tolerance impaired;balance;cognition;communication;mobility;strength;pain;post-surgical precautions;postural control   Assessment of Occupational Performance 5 or more Performance Deficits   Identified Performance Deficits toileting, lower body dressing, upper body dressing, grooming/hygiene, functional endurance, cognition   Planned Therapy Interventions (OT) ADL retraining;IADL retraining;balance training;bed mobility training;cognition;strengthening;transfer training;home program guidelines;progressive activity/exercise;risk factor education   Clinical Decision Making Complexity (OT) comprehensive assessment/high complexity   Risk & Benefits of therapy have been explained evaluation/treatment results reviewed;care plan/treatment goals reviewed;risks/benefits reviewed;current/potential barriers reviewed;participants voiced agreement with care plan;participants included;patient   OT Total Evaluation Time   OT Eval, High Complexity Minutes (47116) 10   OT Goals   Therapy Frequency (OT) 4 times/week   OT Predicted Duration/Target Date for Goal Attainment 04/10/25   OT Goals Hygiene/Grooming;Upper Body Dressing;Lower Body Dressing;Toilet Transfer/Toileting;Cognition;Aerobic Activity   OT: Hygiene/Grooming minimal assist  (from recliner)   OT: Upper Body Dressing Moderate assist  (seated)   OT: Lower Body Dressing Moderate assist   OT: Toilet  Transfer/Toileting Moderate assist;toilet transfer;cleaning and garment management;using adaptive equipment  (Ax2)   OT: Cognitive Patient/caregiver will verbalize understanding of cognitive assessment results/recommendations as needed for safe discharge planning   OT: Perform aerobic activity with stable cardiovascular response intermittent activity;10 minutes   Self-Care/Home Management   Self-Care/Home Mgmt/ADL, Compensatory, Meal Prep Minutes (31988) 8   Symptoms Noted During/After Treatment (Meal Preparation/Planning Training) fatigue;dizziness   Treatment Detail/Skilled Intervention Pt agreeable to participation in therapy. Pt tolerated EOB sitting for ~5 min with Max Ax2, posterior leaning noted, cueing for leaning forward. Pt reported dizziness, BP checked and WNL. Pt desat to 82% on 6 LPM via NC. RT present and monitoring throughout session, placed pt back on HFNC up to 40 LPM 80%FiO2. Pt required Max A for rolling in bed at end of session to place pillows to promote comfort and enhance ergonomic positioning. Pt left in bed at end of session with bed alarm on and call light in reach.   OT Discharge Planning   OT Plan co-tx, EOB sitting, monitor resp needs (may want RT in room)   OT Discharge Recommendation (DC Rec) LTACH-pending meets medical criteria   OT Rationale for DC Rec Pt currently with high respiratory, medical, and therapy needs. Recommend LTACH if pt qualifies, otheriwse recommend TCU.   OT Brief overview of current status Max Ax2 for bed mobility, desats with minimal activity   OT Total Distance Amb During Session (feet) 0   Total Session Time   Timed Code Treatment Minutes 8   Total Session Time (sum of timed and untimed services) 18

## 2025-04-03 NOTE — PROGRESS NOTES
RCAT Treatment Plan    Patient Score: 20  Patient Acuity: 2    Clinical Indication for Therapy: history of bronchospasm and prevent atelectasis    Therapy Ordered: Duo Q4, 3% Q8, PEP QID    Assessment Summary: PMH includes COPD, emphysema, ADEEL, DKA, AFIB, and air trapping. BS are clear diminished. Pt is obtunded but awakes to verbal que. MD aware. Pt struggling to perform respiratory therapies due to weakness. RT will continue to follow.     Roxana Jean-Baptiste, RT  4/3/2025

## 2025-04-03 NOTE — PROGRESS NOTES
CLINICAL NUTRITION SERVICES -  NOTE      EVALUATION OF THE PROGRESS TOWARD GOALS   Diet: NPO     NEW FINDINGS   TF was not started as patient was extubated 4/1.  Concern for aspiration risk, speech to eval swallow.  Patient is s/p I&D perineum 4/2 with penrose drain placed.  RN reports patient had 3 loose BM overnight.  Per I/Os -5.7 L since admission, patient was on diuretics 3/29-3/31.     INTERVENTIONS  Implementation  Speech to eval swallow.  Recommend Consistent Carb diet restriction for BG control when able to eat.

## 2025-04-03 NOTE — PLAN OF CARE
[facility] - ICU    RN Progress Note:            Pertinent Assessments:      Please refer to flowsheet rows for full assessment     Alert, forgetful, speaks slow, follows command, CXR done, on BIPAP all night, on levophed gtt keeping MAP >65, able to turn off at 0638, SR, NPO, incontinent loose BM x 2, noguera catheter output adequate, on heparin gtt protocol, arterial line pulled out very positional, open incision on right groin packing reinforced.          Key Events - This Shift:       Please see above notes.     RN Managed Protocols Ordered:  yes  Protocols:potassium, magnesium, phosphorus, heparin  PRN'S: none  Protocols Status: addressed, magnesium replaced, potassium, magnesium and phosphorus rechecks next am, anti xa at 0820.             Barriers to Discharge / Downgrade:     Hemodynamically unstable.

## 2025-04-03 NOTE — PROGRESS NOTES
"Speech-Language Pathology: Clinical Swallow Evaluation     04/03/25 1600   Appointment Info   Signing Clinician's Name / Credentials (SLP) Nisreen Lerma MA, CCC-SLP   General Information   Onset of Illness/Injury or Date of Surgery 03/31/25   Referring Physician Wild Tong MD   Pertinent History of Current Problem Per ordering provider \"58 yo F past medicine history of intellectual disability, COPD-emphysema with severe obstruction & air-trapping, active tobacco smoker, FVL evidence of fixed upper airway obstruction, obesity, ADEEL with PAP nonadherence, secondary polycythemia, HTN, T2DM, & CKD 3. Recent admission at  [facility] 3/18 - 3/20/22 for DKA, hypoxemic respiratory insufficiency & condylomata acuminata in the setting of medication & PAP nonadherence or inability to care for self; discharged with insulin pump, 2 LPM NC O2, & acyclovir     Presented 3/22/25 to [facility] ED for chest pain, dyspnea, & reduced level of consciousness. Found to have acute toxic-metabolic encephalopathy, DKA, AF RVR, preserved biventricular systolic function, distributive shock, acute hypoxemic-hypercapnic respiratory failure requiring invasive ventilation (3/23 - 4/1/25), acute exacerbation of COPD, segmental & subsegmental PE, right lower lobe atelectasis without evidence of mucus plugging (3/31/25 bronchoscopy), ventilator associated pneumonia, reji-anal abscess s/p I&D with penrose drain placement (3/31), & non-oliguric YADIRA \"   General Observations Awake   Type of Evaluation   Type of Evaluation Swallow Evaluation   Oral Motor   Oral Musculature generally intact   Mucosal Quality adequate   Dentition (Oral Motor)   Dentition (Oral Motor) edentulous;inflammation around teeth/gums   Facial Symmetry (Oral Motor)   Facial Symmetry (Oral Motor) WNL   Lip Function (Oral Motor)   Lip Range of Motion (Oral Motor) protrusion impairment;retraction impairment   Lip Strength (Oral Motor) bilateral   Comment, Lip Function (Oral " Motor) Generalized weakness   Tongue Function (Oral Motor)   Tongue ROM (Oral Motor)   (ROM)   Comment, Tongue Function (Oral Motor) Generalized weakness   Cough/Swallow/Gag Reflex (Oral Motor)   Volitional Throat Clear/Cough (Oral Motor) reduced strength   Volitional Swallow (Oral Motor) mildly delayed;effortful   Vocal Quality/Secretion Management (Oral Motor)   Vocal Quality (Oral Motor) hoarse   Secretion Management (Oral Motor) WNL   General Swallowing Observations   Past History of Dysphagia None per EMR. Pt was intubated from 3/22/25-4/1/25.   Comment, General Swallowing Observations Pt is awake and generally cooperative. Responses are delayed   Respiratory Support high-flow  (40L 80%)   Current Diet/Method of Nutritional Intake (General Swallowing Observations, NIS) NPO   Swallowing Evaluation Clinical swallow evaluation   Clinical Swallow Evaluation   Clinical Swallow Evaluation Textures Trialed thin liquids;pureed   Clinical Swallow Eval: Thin Liquid Texture Trial   Mode of Presentation, Thin Liquids spoon   Volume of Liquid or Food Presented 2 ice chips, 3 sips of water by spoon   Oral Phase of Swallow delayed AP movement  (intermittent cues to swallow needed)   Oral Residue right lip drooling   Pharyngeal Phase of Swallow intact   Diagnostic Statement No overt s/s aspiration.   Clinical Swallow Evaluation: Puree Solid Texture Trial   Mode of Presentation, Puree spoon   Volume of Puree Presented 2 bites   Oral Phase, Puree   (cues needed to strip bolus from spoon and swallow)   Pharyngeal Phase, Puree intact   Diagnostic Statement No overt s/s aspiration   Swallowing Recommendations   Diet Consistency Recommendations NPO  (Except medications crushed in puree. Pt must be alert and upright. RN to ensure swallow prior to next bite. Cue swallow as needed. Hold all PO if not alert and following instructions.)   Instrumental Assessment Recommendations instrumental evaluation not recommended at this time  (May  be warranted pending progression. Not appropriate at this time d/t cognition.)   General Therapy Interventions   Planned Therapy Interventions Dysphagia Treatment   Clinical Impression   Criteria for Skilled Therapeutic Interventions Met (SLP Eval) Yes, treatment indicated   Risks & Benefits of therapy have been explained evaluation/treatment results reviewed;care plan/treatment goals reviewed;patient;participants included  (ongoing education needed)   Clinical Impression Comments Clinical Swallow Evaluation completed. Patient had no overt s/s aspiration with intake. Oral motor function was very weak, notable for anterior oral loss. Difficulty stripping bolus from spoon and coordinating breathing with swallow. Occasionally blowing liquid off spoon. Mastication was not assessed d/t safety concerns. Hyolaryngeal elevation appears present upon visualization and palpation.  SLP to follow for onoging dysphagia management.   SLP Total Evaluation Time   Eval: oral/pharyngeal swallow function, clinical swallow Minutes (89596) 25   SLP Discharge Planning   SLP Plan Fri0/7;   SLP Brief overview of current status  NPO except medications crushed in puree. Pt must be alert and upright. RN to ensure swallow prior to next bite. Cue swallow as needed. Hold all PO if not alert and following instructions.

## 2025-04-03 NOTE — PROGRESS NOTES
04/03/25 1312   Appointment Info   Signing Clinician's Name / Credentials (PT) Tessa Henley,PT   Living Environment   People in Home significant other   Current Living Arrangements apartment   Self-Care   Equipment Currently Used at Home none   Activity/Exercise/Self-Care Comment per chart pt I with gait no AD, ADLS, and most IADLS. pt walks or uses bus for transporation and has RN weekly to set up meds   General Information   Onset of Illness/Injury or Date of Surgery 03/31/25   Referring Physician Tamara Corey PA-C   Patient/Family Therapy Goals Statement (PT) none stated   Pertinent History of Current Problem (include personal factors and/or comorbidities that impact the POC) Marly Cannon is a 59 year old female with PMH of COPD (on home O2), HTN, CKD, DM II who was admitted to OSH on 3/22/25-3/31/25 with DKA and acute hypoxic respiratory failure in setting of LLL PE and who was subsequently transferred to [facility] for higher level of care. Patient is intubated and sedated, unable to provide further history. Per bedside RN, penrose drain in place to right perineum and actively draining. 4/1 extubated , 4/2 I&Dperianal abscess   Existing Precautions/Restrictions oxygen therapy device and L/min;other (see comments)   General Observations pt in ICU bed, RT presentand changing pt to NC from hiflo, RT willing to stay while therapies mobilizing pt to A O2 needs   Cognition   Affect/Mental Status (Cognition) unable/difficult to assess   Orientation Status (Cognition) person   Follows Commands (Cognition) follows one-step commands;verbal cues/prompting required;repetition of directions required   Cognitive Status Comments pt able to respond with short answer to most questions, sometimes only nodding yes/no to questions   Pain Assessment   Patient Currently in Pain Yes, see Vital Sign flowsheet  (L knee with movement)   Integumentary/Edema   Integumentary/Edema Comments swelling noted in hands    Range of Motion (ROM)   ROM Comment BLE limited   Strength (Manual Muscle Testing)   Strength (Manual Muscle Testing) Deficits observed during functional mobility   Bed Mobility   Bed Mobility Limitations decreased ability to use legs for bridging/pushing;decreased ability to use arms for pushing/pulling;impaired ability to control trunk for mobility   Impairments Contributing to Impaired Bed Mobility impaired balance;decreased strength   Assistive Device (Bed Mobility) draw sheet;other (see comments)  (HOB elevated)   Comment, (Bed Mobility) maxAx2-3, dependent   Transfers   Comment, (Transfers) unable to trial due to weakness, poor sitting balance, fatigue and decreasing O2 sat   Balance   Balance Comments poor siiting balance   Clinical Impression   Criteria for Skilled Therapeutic Intervention Yes, treatment indicated   PT Diagnosis (PT) decreased functional mobility   Influenced by the following impairments pain ,O2 needs, decreased strength, balance and endurence   Functional limitations due to impairments bed mob, transfers . gait   Clinical Presentation (PT Evaluation Complexity) evolving   Clinical Presentation Rationale presents as medically diagnosed   Clinical Decision Making (Complexity) moderate complexity   Planned Therapy Interventions (PT) bed mobility training;strengthening;transfer training;progressive activity/exercise;gait training   Risk & Benefits of therapy have been explained evaluation/treatment results reviewed;patient   PT Total Evaluation Time   PT Eval, Moderate Complexity Minutes (65960) 12   Physical Therapy Goals   PT Frequency 4x/week   PT Predicted Duration/Target Date for Goal Attainment 04/10/25   PT Goals Bed Mobility;Transfers;PT Goal 1;PT Goal 2   PT: Bed Mobility Moderate assist  (AX2)   PT: Transfers Moderate assist;Sit to/from stand;Bed to/from chair;Assistive device  (AX2 or standing lift device)   PT: Goal 1 pt able to sit EOB x3min SBA/CGA for sitting balance   PT:  Goal 2 pt able to participate BLE ex x10 reps for increasing strength for transfers   Interventions   Interventions Quick Adds Therapeutic Activity   Therapeutic Activity   Therapeutic Activities: dynamic activities to improve functional performance Minutes (73632) 8   Symptoms Noted During/After Treatment Fatigue;Dizziness;Significant change in vital signs  (RT pllaced pt on 6L )2 NC prior to moving and was able to A with O2 needs before returning to supine RT placed pt on highflow to A with O2 sats)   Treatment Detail/Skilled Intervention sitting EOB maxAx1 -2 for sitting balance pt reporting dizziness, BP WNL sitting EOB, pt able to sit 2min , dependent sit>supine and supine scooting in bed   PT Discharge Planning   PT Plan BLE ex, practise supine > sit and sitting balance, progress to standing - may need standing lift   PT Discharge Recommendation (DC Rec) LTACH, pending meets medical criteria   PT Rationale for DC Rec pt decreased functional mobility , requiring A 2-3 t psit EOB ,needs further rehab due to medical complexity   PT Brief overview of current status supine >sit maxAx2-3 , sitting balance maxAx1-2. sit>supine and supine scooting dependent   PT Total Distance Amb During Session (feet) 0   PT Equipment Needed at Discharge other (see comments)  (TBD may need w/c. lift, FWW)

## 2025-04-04 ENCOUNTER — APPOINTMENT (OUTPATIENT)
Dept: SPEECH THERAPY | Facility: HOSPITAL | Age: 60
DRG: 981 | End: 2025-04-04
Attending: STUDENT IN AN ORGANIZED HEALTH CARE EDUCATION/TRAINING PROGRAM
Payer: COMMERCIAL

## 2025-04-04 ENCOUNTER — APPOINTMENT (OUTPATIENT)
Dept: OCCUPATIONAL THERAPY | Facility: HOSPITAL | Age: 60
DRG: 981 | End: 2025-04-04
Attending: STUDENT IN AN ORGANIZED HEALTH CARE EDUCATION/TRAINING PROGRAM
Payer: COMMERCIAL

## 2025-04-04 ENCOUNTER — APPOINTMENT (OUTPATIENT)
Dept: PHYSICAL THERAPY | Facility: HOSPITAL | Age: 60
DRG: 981 | End: 2025-04-04
Attending: STUDENT IN AN ORGANIZED HEALTH CARE EDUCATION/TRAINING PROGRAM
Payer: COMMERCIAL

## 2025-04-04 VITALS
DIASTOLIC BLOOD PRESSURE: 54 MMHG | RESPIRATION RATE: 19 BRPM | SYSTOLIC BLOOD PRESSURE: 114 MMHG | OXYGEN SATURATION: 94 % | TEMPERATURE: 98.2 F | HEART RATE: 76 BPM | WEIGHT: 218.03 LBS | BODY MASS INDEX: 36.28 KG/M2

## 2025-04-04 LAB
ANION GAP SERPL CALCULATED.3IONS-SCNC: 10 MMOL/L (ref 7–15)
BUN SERPL-MCNC: 27.6 MG/DL (ref 8–23)
CALCIUM SERPL-MCNC: 8.8 MG/DL (ref 8.8–10.4)
CHLORIDE SERPL-SCNC: 102 MMOL/L (ref 98–107)
CREAT SERPL-MCNC: 1.56 MG/DL (ref 0.51–0.95)
EGFRCR SERPLBLD CKD-EPI 2021: 37 ML/MIN/1.73M2
ERYTHROCYTE [DISTWIDTH] IN BLOOD BY AUTOMATED COUNT: 14.4 % (ref 10–15)
GLUCOSE BLDC GLUCOMTR-MCNC: 122 MG/DL (ref 70–99)
GLUCOSE BLDC GLUCOMTR-MCNC: 123 MG/DL (ref 70–99)
GLUCOSE BLDC GLUCOMTR-MCNC: 142 MG/DL (ref 70–99)
GLUCOSE BLDC GLUCOMTR-MCNC: 164 MG/DL (ref 70–99)
GLUCOSE BLDC GLUCOMTR-MCNC: 213 MG/DL (ref 70–99)
GLUCOSE SERPL-MCNC: 122 MG/DL (ref 70–99)
HCO3 SERPL-SCNC: 30 MMOL/L (ref 22–29)
HCT VFR BLD AUTO: 32.2 % (ref 35–47)
HGB BLD-MCNC: 10 G/DL (ref 11.7–15.7)
MAGNESIUM SERPL-MCNC: 2.2 MG/DL (ref 1.7–2.3)
MCH RBC QN AUTO: 29.9 PG (ref 26.5–33)
MCHC RBC AUTO-ENTMCNC: 31.1 G/DL (ref 31.5–36.5)
MCV RBC AUTO: 96 FL (ref 78–100)
PHOSPHATE SERPL-MCNC: 4.1 MG/DL (ref 2.5–4.5)
PLATELET # BLD AUTO: 298 10E3/UL (ref 150–450)
POTASSIUM SERPL-SCNC: 3.9 MMOL/L (ref 3.4–5.3)
RBC # BLD AUTO: 3.35 10E6/UL (ref 3.8–5.2)
SODIUM SERPL-SCNC: 142 MMOL/L (ref 135–145)
UFH PPP CHRO-ACNC: 0.4 IU/ML
WBC # BLD AUTO: 7.9 10E3/UL (ref 4–11)

## 2025-04-04 PROCEDURE — 250N000013 HC RX MED GY IP 250 OP 250 PS 637: Performed by: INTERNAL MEDICINE

## 2025-04-04 PROCEDURE — 83735 ASSAY OF MAGNESIUM: CPT | Performed by: PHYSICIAN ASSISTANT

## 2025-04-04 PROCEDURE — 97530 THERAPEUTIC ACTIVITIES: CPT | Mod: GP

## 2025-04-04 PROCEDURE — 82310 ASSAY OF CALCIUM: CPT | Performed by: PHYSICIAN ASSISTANT

## 2025-04-04 PROCEDURE — 99231 SBSQ HOSP IP/OBS SF/LOW 25: CPT | Mod: 24

## 2025-04-04 PROCEDURE — 99232 SBSQ HOSP IP/OBS MODERATE 35: CPT | Performed by: STUDENT IN AN ORGANIZED HEALTH CARE EDUCATION/TRAINING PROGRAM

## 2025-04-04 PROCEDURE — 84100 ASSAY OF PHOSPHORUS: CPT | Performed by: INTERNAL MEDICINE

## 2025-04-04 PROCEDURE — 250N000013 HC RX MED GY IP 250 OP 250 PS 637: Performed by: STUDENT IN AN ORGANIZED HEALTH CARE EDUCATION/TRAINING PROGRAM

## 2025-04-04 PROCEDURE — 250N000013 HC RX MED GY IP 250 OP 250 PS 637: Performed by: PHYSICIAN ASSISTANT

## 2025-04-04 PROCEDURE — 250N000009 HC RX 250: Performed by: STUDENT IN AN ORGANIZED HEALTH CARE EDUCATION/TRAINING PROGRAM

## 2025-04-04 PROCEDURE — 97535 SELF CARE MNGMENT TRAINING: CPT | Mod: GO

## 2025-04-04 PROCEDURE — 85014 HEMATOCRIT: CPT | Performed by: PHYSICIAN ASSISTANT

## 2025-04-04 PROCEDURE — 250N000011 HC RX IP 250 OP 636: Performed by: PHYSICIAN ASSISTANT

## 2025-04-04 PROCEDURE — 94640 AIRWAY INHALATION TREATMENT: CPT

## 2025-04-04 PROCEDURE — 999N000157 HC STATISTIC RCP TIME EA 10 MIN

## 2025-04-04 PROCEDURE — 200N000001 HC R&B ICU

## 2025-04-04 PROCEDURE — 92526 ORAL FUNCTION THERAPY: CPT | Mod: GN

## 2025-04-04 PROCEDURE — 85520 HEPARIN ASSAY: CPT | Performed by: INTERNAL MEDICINE

## 2025-04-04 PROCEDURE — 94640 AIRWAY INHALATION TREATMENT: CPT | Mod: 76

## 2025-04-04 PROCEDURE — 99232 SBSQ HOSP IP/OBS MODERATE 35: CPT | Performed by: INTERNAL MEDICINE

## 2025-04-04 PROCEDURE — 94799 UNLISTED PULMONARY SVC/PX: CPT

## 2025-04-04 PROCEDURE — 250N000011 HC RX IP 250 OP 636: Performed by: INTERNAL MEDICINE

## 2025-04-04 PROCEDURE — 94660 CPAP INITIATION&MGMT: CPT

## 2025-04-04 RX ORDER — AMLODIPINE BESYLATE 5 MG/1
5 TABLET ORAL DAILY
Status: DISCONTINUED | OUTPATIENT
Start: 2025-04-04 | End: 2025-04-11 | Stop reason: HOSPADM

## 2025-04-04 RX ADMIN — HYDROMORPHONE HYDROCHLORIDE 0.4 MG: 0.2 INJECTION, SOLUTION INTRAMUSCULAR; INTRAVENOUS; SUBCUTANEOUS at 09:27

## 2025-04-04 RX ADMIN — SODIUM CHLORIDE SOLN NEBU 3% 3 ML: 3 NEBU SOLN at 07:25

## 2025-04-04 RX ADMIN — ALBUTEROL SULFATE 2.5 MG: 2.5 SOLUTION RESPIRATORY (INHALATION) at 20:27

## 2025-04-04 RX ADMIN — ALBUTEROL SULFATE 2.5 MG: 2.5 SOLUTION RESPIRATORY (INHALATION) at 07:26

## 2025-04-04 RX ADMIN — ACETAMINOPHEN 975 MG: 325 TABLET ORAL at 13:59

## 2025-04-04 RX ADMIN — HEPARIN SODIUM 1600 UNITS/HR: 10000 INJECTION, SOLUTION INTRAVENOUS at 05:43

## 2025-04-04 RX ADMIN — PIPERACILLIN AND TAZOBACTAM 3.38 G: 3; .375 INJECTION, POWDER, FOR SOLUTION INTRAVENOUS at 00:11

## 2025-04-04 RX ADMIN — INSULIN ASPART 2 UNITS: 100 INJECTION, SOLUTION INTRAVENOUS; SUBCUTANEOUS at 21:21

## 2025-04-04 RX ADMIN — INSULIN GLARGINE 10 UNITS: 100 INJECTION, SOLUTION SUBCUTANEOUS at 21:21

## 2025-04-04 RX ADMIN — PIPERACILLIN AND TAZOBACTAM 3.38 G: 3; .375 INJECTION, POWDER, FOR SOLUTION INTRAVENOUS at 08:23

## 2025-04-04 RX ADMIN — ALBUTEROL SULFATE 2.5 MG: 2.5 SOLUTION RESPIRATORY (INHALATION) at 04:38

## 2025-04-04 RX ADMIN — ACETAMINOPHEN 975 MG: 325 TABLET ORAL at 05:43

## 2025-04-04 RX ADMIN — APIXABAN 10 MG: 5 TABLET, FILM COATED ORAL at 21:24

## 2025-04-04 RX ADMIN — HYDROMORPHONE HYDROCHLORIDE 0.2 MG: 0.2 INJECTION, SOLUTION INTRAMUSCULAR; INTRAVENOUS; SUBCUTANEOUS at 19:43

## 2025-04-04 RX ADMIN — ALBUTEROL SULFATE 2.5 MG: 2.5 SOLUTION RESPIRATORY (INHALATION) at 00:01

## 2025-04-04 RX ADMIN — PIPERACILLIN AND TAZOBACTAM 3.38 G: 3; .375 INJECTION, POWDER, FOR SOLUTION INTRAVENOUS at 21:19

## 2025-04-04 RX ADMIN — ALBUTEROL SULFATE 2.5 MG: 2.5 SOLUTION RESPIRATORY (INHALATION) at 15:39

## 2025-04-04 RX ADMIN — AMLODIPINE BESYLATE 5 MG: 5 TABLET ORAL at 12:12

## 2025-04-04 RX ADMIN — CHLORHEXIDINE GLUCONATE 15 ML: 1.2 SOLUTION ORAL at 08:23

## 2025-04-04 RX ADMIN — SODIUM CHLORIDE 30 MG/ML INHALATION SOLUTION 3 ML: 30 SOLUTION INHALANT at 00:01

## 2025-04-04 RX ADMIN — SODIUM CHLORIDE SOLN NEBU 3% 3 ML: 3 NEBU SOLN at 15:40

## 2025-04-04 RX ADMIN — INSULIN ASPART 1 UNITS: 100 INJECTION, SOLUTION INTRAVENOUS; SUBCUTANEOUS at 00:11

## 2025-04-04 RX ADMIN — ACETAMINOPHEN 975 MG: 325 TABLET ORAL at 21:19

## 2025-04-04 RX ADMIN — ALBUTEROL SULFATE 2.5 MG: 2.5 SOLUTION RESPIRATORY (INHALATION) at 11:53

## 2025-04-04 ASSESSMENT — ACTIVITIES OF DAILY LIVING (ADL)
ADLS_ACUITY_SCORE: 81

## 2025-04-04 NOTE — PROGRESS NOTES
INFECTIOUS DISEASE FOLLOW UP NOTE  Date: 04/04/2025     ================================================================  SUBJECTIVE  No events    ================================================================  OBJECTIVE  BP (!) 140/63 (BP Location: Right arm)   Pulse 76   Temp 97.6  F (36.4  C) (Oral)   Resp 23   Wt 98.2 kg (216 lb 7.9 oz)   SpO2 97%   BMI 36.03 kg/m      Physical Exam  General: lethargic, no apparent distress  HEENT: atraumatic, normocephalic, no scleral icterus, moist mucus membranes, no cervical lymphadenopathy  Heart: Normal rate, regular rhythm  Lungs: good inspiratory effort  Abdomen: soft, non-tender, non-distended  Extremities: no peripheral edema. Buttock ulcer per below      Pertinent labs  CBC RESULTS:   Recent Labs   Lab Test 04/03/25  0417   WBC 11.3*   RBC 3.59*   HGB 11.0*   HCT 33.5*   MCV 93   MCH 30.6   MCHC 32.8   RDW 14.4           Imaging  3/22 CTA chest  1.  Very small pulmonary artery emboli to subsegmental branches of the left lower lobe. No right heart strain.  2.  Mild emphysematous changes of the lungs. No focal pulmonary consolidation or pleural effusion.  3.  Trace pericardial effusion.  4.  Small sliding-type hiatal hernia.     3/24 CTA chest  Exam positive for pulmonary embolism. There has been progression of emboli as seen previously. There is now involvement a segmental and subsegmental arteries of the right lower lobe. There is  now atelectasis of the entire right lower lobe with volume loss. There is some abnormal enhancement within portions of the right lower lobe which may be sequela of infarction/ischemia. There is opacification of bronchi within the right lower lobe. Mucous plugging may be present.12     3/24 CT A/P  There is gallbladder distention. A small amount of gallbladder sludge is possible but no calcified gallstones are seen and there is no biliary dilatation. There is diffuse subcutaneous edema. Small right inguinal hernia containing  fat. No loops of bowel are involved.     3/31 CT pelvis  Right inguinal hernia containing fat. Worsening subcutaneous edema and edema in the perineum and labia on the right as compared to previous examination. No discrete abscess is seen.     3/31 CT chest  1.  Complete right and near complete left lower lobe collapse.  2.  Mild groundglass density in the right middle lobe, nonspecific.        Microbiology data  3/17 flu/covid/rsv: negative  3/17 blood: NG  3/22 blood: NG  3/24 MRSA nares: negative  3/26 sputum culture: candida, GPCs  3/31 serum galactomannan: negative  3/31 beta-D glucan: normal  3/31 BAL              49 PMNs, 11% PMNs, 30% monocytes, 59% lining cells              Culture: NGTD              AFB culture: NGTD              Histoplasma Ag: negative              Nocardia culture: NGTD              Legionella: negative  4/1 blood: NGTD     I have personally reviewed the relevant laboratory, imaging, and microbiology data  ================================================================  Assessment  Marly Cannon is a 59 year old with perianal abscess.  PMH intellectual disability, COPD w/ severe obstruction, active tobacco use, obesity, ADEEL, secondary polycythemia, DM2, CKD3, HTN.     Recently admitted 3/18-3/20 for DKA + respiratory failure + condyloma acuminata.  Was discharged with insulin pump, 2L oxygen, acyclovir.  Presented again on 3/22 with chest pain, shortness of breath, found to have DKA, distributive shock, respiratory failure, PE.  She required intubation, and this hospital course was complicated by VAP and perianal abscess s/p I&D with penrose placement on 3/31.  Chest CT on 3/31 showed bilateral lobe collapse.  She has been on pip-tazo since 3/23.  Underwent BAL on 3/31, studies are pending.      For now will continue broad antibiotics to cover both GI/ dre as well as hospital-associated pulmonary pathogens.  Over the last 7 days some of her clinical parameters have improved  (downtrending WBC count, now extubated), but still significantly inflamed and with collapse of her lower lung lobes means duration of therapy will need to be extended.  In addition she continued to have feculant drainage from her perianal wound, so was taken back to the OR for I&D on 4/2.     Impression  # Perianal abscess              - s/p penrose drain placement 3/31              - s/p I&D 4/2  # Condyloma acuminata              - s/p acyclovir in mid-March  # Acute hypoxic respiratory failure  # RML pneumonia, ventilator associated  # Bilateral lower lobe collapse  # Segmental pulmonary embolus  # Severe COPD  # Poorly controlled DM2 (A1C 12)     ================================================================  Plan  - Continue pip-tazo  - Will likely repeat CT ~4/8 to reassess abdomen and pelvis  - ID will follow    Jeremy Cannon MD, MPH  [facility]   Office Telephone [phone].  Fax [phone]  [facility] paging

## 2025-04-04 NOTE — PROGRESS NOTES
CLINICAL NUTRITION SERVICES - REASSESSMENT NOTE     RECOMMENDATIONS FOR MDs/PROVIDERS TO ORDER:      Registered Dietitian Interventions:  Magic cup daily.    Future/Additional Recommendations:       INFORMATION OBTAINED  Assessed patient in room.    CURRENT NUTRITION ORDERS  Diet: Level 4: Pureed Dysphagia Diet and Mildly Thick Liquids    NPO 3/31-4/4.  Was on Osmolite 1.5 tube feeding 3/25-3/31. (OSH)    CURRENT INTAKE/TOLERANCE  Patient start po diet with lunch today after swallow eval.      NEW FINDINGS  Patient reports eating ok.  We discussed the Pureed texture diet.  Patient interested in trying the Magic cup.   GI symptoms: Last BM 4/4     Skin/wounds: sacral/buttock PI, perineum abcess.    Nutrition-relevant labs: Reviewed  Nutrition-relevant medications: Reviewed  Weight: 98.2 kg   wt down 7.5 L since admission.    MALNUTRITION  % Intake: </= 50% for >/= 5 days (severe) and <75% x 7 days.   % Weight Loss: Weight loss does not meet criteria -fluid wt loss.   Subcutaneous Fat Loss: None observed  Muscle Loss: None observed  Fluid Accumulation/Edema: Mild, 1+  Malnutrition Diagnosis: Moderate malnutrition in the context of acute illness or injury  Malnutrition Present on Admission: No    EVALUATION OF THE PROGRESS TOWARD GOALS   Previous Goals  Tolerate TF at goal rate-no longer applicable  Meet nutrition needs-not met  BG < 180-met while NPO  Electrolytes WNL-met    Previous Nutrition Diagnosis  Swallowing difficulty related to acute illness as evidenced by intubation and need for nutrition support   Evaluation: No longer applicable, nutrition diagnosis changed below    NUTRITION DIAGNOSIS  Swallowing difficulty related to recent intubation as evidenced by dysphagia diet.    INTERVENTIONS  Medical food supplement therapy-Magic cup daily.      GOALS  Blood glucose 140-180 mg/dL  Patient to consume % of nutritionally adequate meal trays TID, or the equivalent with supplements/snacks.      MONITORING/EVALUATION  Progress toward goals will be monitored and evaluated per policy.

## 2025-04-04 NOTE — PROGRESS NOTES
"Care Management Follow Up    Length of Stay (days): 4    Expected Discharge Date: 04/08/2025     Concerns to be Addressed:     discharge planning, medical progression  Patient plan of care discussed at interdisciplinary rounds: Yes    Anticipated Discharge Disposition:  LTACH?              Anticipated Discharge Services:  nursing, therapy  Anticipated Discharge DME:  TBD, possibly lift, w/c, FWW    Patient/family educated on Medicare website which has current facility and service quality ratings:    Education Provided on the Discharge Plan:    Patient/Family in Agreement with the Plan:      Referrals Placed by CM/SW:  LTACH  Private pay costs discussed: Not applicable    Discussed  Partnership in Safe Discharge Planning  document with patient/family: No     Handoff Completed: No, handoff not indicated or clinically appropriate    Additional Information:  Social Hx:   \"Patient lives in an apartment with her significant other Tavo. They have been together for 18 years or more. Patient has mild cognitive disability.  She is independent with ADLs and most IADLS, except money management, medications set up and driving.  She ambulates with no devices and will walk or take the bus for transportation. She is retired from working. She has home RN weekly visits for medication set up through [facility].     Per [facility] Nelly, patient does not have [facility] designating health agent but Tavo would know her best for decision making. \"    [facility]: Nelly [phone].    4/4:  PT and OT recommending LTACH at discharge.  CM placed referral to LTACH to see if patient meet their criteria.       Next Steps: CM will continue to monitor medical progression, follow treatment team recommendations, and aid in discharge planning.        Beti Wilkes RN     "

## 2025-04-04 NOTE — PROGRESS NOTES
[facility]    Medicine Progress Note - Hospitalist Service    Date of Admission:  3/31/2025    Assessment & Plan   59-year-old female patient who was transferred from [facility] on 3/31 for difficulty of weaning off mechanical ventilation.  She is also receiving treatment for perianal abscess.  Past medical history significant for hypertension,ckd3,  type 2 diabetes mellitus, intellectual disability, hyperparathyroidism, subsegmental PE, COPD on home o2, untreated ADEEL.     Acute hypoxic respiratory failure due to Ventilator associated pneumonia  - Underlying severe COPD  -Was extubated on 4/1 after prolonged mechanical ventilation  -Now she is on 3 L of oxygen via nasal cannula  -Continue Zosyn  -Taper oxygen as able  -Continue with respiratory hygiene and nebulization therapy   - BiPAP at night  - PT/OT    Segmental and subsegmental PE  - was On heparin gtt., plan to switch to Eliquis 5 mg oral twice daily    Perianal abscess  Condyloma acuminata  - completed acyclovir in mid March  - s/p I&D with Penrose drain placement on 3/31.  - Continue Zosyn  - ID on board    Type 2 diabetes mellitus, poorly controlled  S/p DKA treatment recently  - Hemoglobin A1c is 12.7  - 10 units of Lantus subcutaneously  - Medium intensity insulin sliding scale  - Accu-Checks  - Hypoglycemia protocol    YADIRA on CKD stage III  - Baseline creatinine is around 1.1-1.3, today 1.56  - Monitor    Anemia  - Most likely related to chronic medical illness  - Monitor          Diet: Combination Diet Pureed Diet (level 4); Mildly Thick (level 2)    DVT Prophylaxis: DOAC  Landrum Catheter: PRESENT, indication: Wound deterioration and failed external collection device  Lines: PRESENT      CVC Triple Lumen Right Internal jugular-Site Assessment: WDL      Cardiac Monitoring: ACTIVE order. Indication: ICU  Code Status: Full Code      Clinically Significant Risk Factors               # Hypoalbuminemia: Lowest albumin = 1.7  "g/dL at 3/31/2025  5:35 PM, will monitor as appropriate     # Hypertension: Noted on problem list     # Acute Hypoxic Respiratory Failure: Documented O2 saturation < 90%. Continue supplemental oxygen as needed        # DMII: A1C = 12.7 % (Ref range: <5.7 %) within past 6 months   # Obesity: Estimated body mass index is 36.03 kg/m  as calculated from the following:    Height as of 3/22/25: 1.651 m (5' 5\").    Weight as of this encounter: 98.2 kg (216 lb 7.9 oz)., PRESENT ON ADMISSION     # COPD: noted on problem list        Social Drivers of Health    Tobacco Use: Medium Risk (4/2/2025)    Patient History     Smoking Tobacco Use: Former     Smokeless Tobacco Use: Never     Passive Exposure: Current   Physical Activity: Unknown (5/16/2024)    Exercise Vital Sign     Days of Exercise per Week: 0 days   Social Connections: Unknown (5/16/2024)    Social Connection and Isolation Panel [NHANES]     Frequency of Social Gatherings with Friends and Family: More than three times a week          Disposition Plan     Medically Ready for Discharge: Anticipated in 2-4 Days             Noam Schneider MD  Hospitalist Service  [facility]  Securely message with [employer] (more info)  Text page via [employer] Paging/Directory   ______________________________________________________________________    Interval History   Patient is acutely sick looking.  No distress noted.  She reports having significant pain around her wound area.  Evaluated by SLP and diet has been ordered.  Management plan discussed with the patient and she expressed understanding. Called her Sister Mary to give her an update.    Physical Exam   Vital Signs: Temp: 97.6  F (36.4  C) Temp src: Oral BP: (!) 155/69 Pulse: 79   Resp: 26 SpO2: 96 % O2 Device: Nasal cannula Oxygen Delivery: 3 LPM  Weight: 216 lbs 7.87 oz    General Appearance: No distress noted  Respiratory: Good air entry bilaterally  Cardiovascular: S1 and S2 heard, no murmur or " gallop  GI: Soft abdomen, right lower abdominal tenderness, normoactive bowel sounds  Skin: Intact and warm       Medical Decision Making       40 MINUTES SPENT BY ME on the date of service doing chart review, history, exam, documentation & further activities per the note.      Data

## 2025-04-04 NOTE — PROGRESS NOTES
Patient placed on HFNC due to increased oxygen needs, 40 lpm and 45% fiO2. Breaths are shallow and somewhat labored. BBS diminished.

## 2025-04-04 NOTE — PLAN OF CARE
Goal Outcome Evaluation:      Plan of Care Reviewed With: patient    Overall Patient Progress: improving    Outcome Evaluation: Stable without pressors. On Bipap overnight.    [facility] - ICU    RN Progress Note:            Pertinent Assessments:      Please refer to flowsheet rows for full assessment     Stable overnight. Wound dressing changed due to saturation and packing becoming loose/dangling from wound. Significant serosanguinous drainage from wound. Tolerated dressing change fairly well. Denies pain at rest. Tolerates bipap settings well overnight. Multiple loose Bms. Checked for incontinence frequently. NPO.           Key Events - This Shift:            RN Managed Protocols Ordered:  Yes  Protocols:Potassium, Magnesium, Phosphorus, and Heparin  PRN'S:  Protocols Status: Reviewed with Oncoming RN                Barriers to Discharge / Downgrade:     No barriers to downgrade.         Point of Contact Update: YES-OR-NO: Yes  If No, reason:   Name:  Phone Number:  Summary of Conversation:

## 2025-04-04 NOTE — PROGRESS NOTES
General Surgery Progress Note:    Hospital Day # 4    ASSESSMENT:  1. Pressure injury of buttock, stage 2, unspecified laterality (H) [L89.302]    2. Perineal abscess        Marly Cannon is a 59 year old female PMH COPD on home O2, DM2 12.7% 3/22, recently admitted for DKA and acute hypoxic respiratory failure with LL lobe PE, complicated by VAP, DKA, chronic decubitus ulcer s/p bedside I&D and Penrose placement 3/31 s/p incision and drainage of NSTI 4/2 with Penrose placements.   Leukocytosis resolved today wound bed with some fibrinous tissue however overall appears without necrosis, will continue wound cares and evaluation daily.  Potential for consulting WOC for wound VAC placement next week if no further decompensation of the wound prior to that time    PLAN:  -Okay for dit  -Appreciate excellent ICU cares and keeping area clean and dry  -Continue with dressing changes PRN over top and BID vashe moistened gauze packing  -Will consider WOC involvement / potential wound vac early next week  -Medical management per primary team    SUBJECTIVE:   Marly Cannon was seen on rounds.  States she is doing okay, the wound did hurt quite a bit after the dressing change yesterday.  No further other symptoms or new symptoms overnight.  She was able to sit at the edge of the bed but has not gone any further than that so far.    VITALS RANGE:  Temp:  [97.6  F (36.4  C)-98.7  F (37.1  C)] 97.6  F (36.4  C)  Pulse:  [73-86] 79  Resp:  [16-51] 26  BP: (107-155)/(54-69) 155/69  FiO2 (%):  [40 %-80 %] 40 %  SpO2:  [90 %-100 %] 96 %    PHYSICAL EXAM:  General: patient seen resting in bed in no acute distress  Resp: no increased work of breathing, breathing comfortably on nasal cannula 3L oxygen  Right inguinal area wound: removed packing which had only serosanguineous output on it.  Wound bed on the right inguinal/groin area appears relatively clean especially at the wound edges and no necrosis or purulence visualized on exam.   Pain  than yesterday as well.  Penrose is in place and quite tender to move but is mobile and no further large-volume expression of fluid with this.  Beneath the labia more posterior packing was removed and appeared clean as well, did have some bleeding at the edges.  This area is quite tender with packing.  But overall patient tolerated well with added premedications  Extremities: No edema or cyanosis visualized on exam, no obvious deformities    04/03 0700 - 04/04 0659  In: 434 [I.V.:384]  Out: 1575 [Urine:1575]    No results displayed because visit has over 200 results.           ANNETTE [facility]  [facility] [address] [phone]

## 2025-04-05 ENCOUNTER — APPOINTMENT (OUTPATIENT)
Dept: RADIOLOGY | Facility: HOSPITAL | Age: 60
DRG: 981 | End: 2025-04-05
Attending: STUDENT IN AN ORGANIZED HEALTH CARE EDUCATION/TRAINING PROGRAM
Payer: COMMERCIAL

## 2025-04-05 ENCOUNTER — APPOINTMENT (OUTPATIENT)
Dept: SPEECH THERAPY | Facility: HOSPITAL | Age: 60
DRG: 981 | End: 2025-04-05
Attending: STUDENT IN AN ORGANIZED HEALTH CARE EDUCATION/TRAINING PROGRAM
Payer: COMMERCIAL

## 2025-04-05 LAB
ANION GAP SERPL CALCULATED.3IONS-SCNC: 7 MMOL/L (ref 7–15)
BACTERIA BRONCH: ABNORMAL
BUN SERPL-MCNC: 22.4 MG/DL (ref 8–23)
CALCIUM SERPL-MCNC: 9 MG/DL (ref 8.8–10.4)
CHLORIDE SERPL-SCNC: 99 MMOL/L (ref 98–107)
CREAT SERPL-MCNC: 1.24 MG/DL (ref 0.51–0.95)
CRP SERPL-MCNC: 67.6 MG/L
EGFRCR SERPLBLD CKD-EPI 2021: 49 ML/MIN/1.73M2
ERYTHROCYTE [DISTWIDTH] IN BLOOD BY AUTOMATED COUNT: 14 % (ref 10–15)
FERRITIN SERPL-MCNC: 407 NG/ML (ref 11–328)
GLUCOSE BLDC GLUCOMTR-MCNC: 175 MG/DL (ref 70–99)
GLUCOSE BLDC GLUCOMTR-MCNC: 177 MG/DL (ref 70–99)
GLUCOSE BLDC GLUCOMTR-MCNC: 188 MG/DL (ref 70–99)
GLUCOSE BLDC GLUCOMTR-MCNC: 191 MG/DL (ref 70–99)
GLUCOSE BLDC GLUCOMTR-MCNC: 214 MG/DL (ref 70–99)
GLUCOSE BLDC GLUCOMTR-MCNC: 253 MG/DL (ref 70–99)
GLUCOSE SERPL-MCNC: 223 MG/DL (ref 70–99)
HCO3 SERPL-SCNC: 33 MMOL/L (ref 22–29)
HCT VFR BLD AUTO: 32.5 % (ref 35–47)
HGB BLD-MCNC: 9.9 G/DL (ref 11.7–15.7)
IRON BINDING CAPACITY (ROCHE): 188 UG/DL (ref 240–430)
IRON SATN MFR SERPL: 27 % (ref 15–46)
IRON SERPL-MCNC: 51 UG/DL (ref 37–145)
MAGNESIUM SERPL-MCNC: 1.7 MG/DL (ref 1.7–2.3)
MCH RBC QN AUTO: 29.4 PG (ref 26.5–33)
MCHC RBC AUTO-ENTMCNC: 30.5 G/DL (ref 31.5–36.5)
MCV RBC AUTO: 96 FL (ref 78–100)
PHOSPHATE SERPL-MCNC: 3.3 MG/DL (ref 2.5–4.5)
PLATELET # BLD AUTO: 292 10E3/UL (ref 150–450)
POTASSIUM SERPL-SCNC: 3.7 MMOL/L (ref 3.4–5.3)
RBC # BLD AUTO: 3.37 10E6/UL (ref 3.8–5.2)
SODIUM SERPL-SCNC: 139 MMOL/L (ref 135–145)
UFH PPP CHRO-ACNC: >1.1 IU/ML
VIT B12 SERPL-MCNC: 859 PG/ML (ref 232–1245)
WBC # BLD AUTO: 7.7 10E3/UL (ref 4–11)

## 2025-04-05 PROCEDURE — 999N000157 HC STATISTIC RCP TIME EA 10 MIN

## 2025-04-05 PROCEDURE — 250N000013 HC RX MED GY IP 250 OP 250 PS 637: Performed by: STUDENT IN AN ORGANIZED HEALTH CARE EDUCATION/TRAINING PROGRAM

## 2025-04-05 PROCEDURE — 83540 ASSAY OF IRON: CPT | Performed by: STUDENT IN AN ORGANIZED HEALTH CARE EDUCATION/TRAINING PROGRAM

## 2025-04-05 PROCEDURE — 85048 AUTOMATED LEUKOCYTE COUNT: CPT | Performed by: PHYSICIAN ASSISTANT

## 2025-04-05 PROCEDURE — 99024 POSTOP FOLLOW-UP VISIT: CPT | Performed by: PHYSICIAN ASSISTANT

## 2025-04-05 PROCEDURE — 94640 AIRWAY INHALATION TREATMENT: CPT | Mod: 76

## 2025-04-05 PROCEDURE — 210N000001 HC R&B IMCU HEART CARE

## 2025-04-05 PROCEDURE — 999N000215 HC STATISTIC HFNC ADULT NON-CPAP

## 2025-04-05 PROCEDURE — 92526 ORAL FUNCTION THERAPY: CPT | Mod: GN | Performed by: SPEECH-LANGUAGE PATHOLOGIST

## 2025-04-05 PROCEDURE — 94640 AIRWAY INHALATION TREATMENT: CPT

## 2025-04-05 PROCEDURE — 82565 ASSAY OF CREATININE: CPT | Performed by: PHYSICIAN ASSISTANT

## 2025-04-05 PROCEDURE — 85520 HEPARIN ASSAY: CPT | Performed by: STUDENT IN AN ORGANIZED HEALTH CARE EDUCATION/TRAINING PROGRAM

## 2025-04-05 PROCEDURE — 250N000011 HC RX IP 250 OP 636: Performed by: INTERNAL MEDICINE

## 2025-04-05 PROCEDURE — 99232 SBSQ HOSP IP/OBS MODERATE 35: CPT | Performed by: INTERNAL MEDICINE

## 2025-04-05 PROCEDURE — 86140 C-REACTIVE PROTEIN: CPT | Performed by: INTERNAL MEDICINE

## 2025-04-05 PROCEDURE — 82607 VITAMIN B-12: CPT | Performed by: STUDENT IN AN ORGANIZED HEALTH CARE EDUCATION/TRAINING PROGRAM

## 2025-04-05 PROCEDURE — 82728 ASSAY OF FERRITIN: CPT | Performed by: STUDENT IN AN ORGANIZED HEALTH CARE EDUCATION/TRAINING PROGRAM

## 2025-04-05 PROCEDURE — 94660 CPAP INITIATION&MGMT: CPT

## 2025-04-05 PROCEDURE — 71045 X-RAY EXAM CHEST 1 VIEW: CPT

## 2025-04-05 PROCEDURE — 250N000013 HC RX MED GY IP 250 OP 250 PS 637: Performed by: INTERNAL MEDICINE

## 2025-04-05 PROCEDURE — 84100 ASSAY OF PHOSPHORUS: CPT | Performed by: STUDENT IN AN ORGANIZED HEALTH CARE EDUCATION/TRAINING PROGRAM

## 2025-04-05 PROCEDURE — 250N000011 HC RX IP 250 OP 636: Mod: JZ | Performed by: PHYSICIAN ASSISTANT

## 2025-04-05 PROCEDURE — 83735 ASSAY OF MAGNESIUM: CPT | Performed by: PHYSICIAN ASSISTANT

## 2025-04-05 PROCEDURE — 94799 UNLISTED PULMONARY SVC/PX: CPT

## 2025-04-05 PROCEDURE — 99232 SBSQ HOSP IP/OBS MODERATE 35: CPT | Performed by: STUDENT IN AN ORGANIZED HEALTH CARE EDUCATION/TRAINING PROGRAM

## 2025-04-05 PROCEDURE — 250N000013 HC RX MED GY IP 250 OP 250 PS 637: Performed by: PHYSICIAN ASSISTANT

## 2025-04-05 PROCEDURE — 250N000009 HC RX 250: Performed by: STUDENT IN AN ORGANIZED HEALTH CARE EDUCATION/TRAINING PROGRAM

## 2025-04-05 RX ORDER — MAGNESIUM OXIDE 400 MG/1
400 TABLET ORAL EVERY 4 HOURS
Status: COMPLETED | OUTPATIENT
Start: 2025-04-05 | End: 2025-04-05

## 2025-04-05 RX ORDER — POTASSIUM CHLORIDE 20MEQ/15ML
20 LIQUID (ML) ORAL ONCE
Status: COMPLETED | OUTPATIENT
Start: 2025-04-05 | End: 2025-04-05

## 2025-04-05 RX ADMIN — APIXABAN 10 MG: 5 TABLET, FILM COATED ORAL at 09:55

## 2025-04-05 RX ADMIN — ALBUTEROL SULFATE 2.5 MG: 2.5 SOLUTION RESPIRATORY (INHALATION) at 04:22

## 2025-04-05 RX ADMIN — INSULIN ASPART 2 UNITS: 100 INJECTION, SOLUTION INTRAVENOUS; SUBCUTANEOUS at 04:58

## 2025-04-05 RX ADMIN — HYDROMORPHONE HYDROCHLORIDE 0.4 MG: 0.2 INJECTION, SOLUTION INTRAMUSCULAR; INTRAVENOUS; SUBCUTANEOUS at 10:02

## 2025-04-05 RX ADMIN — ALBUTEROL SULFATE 2.5 MG: 2.5 SOLUTION RESPIRATORY (INHALATION) at 11:06

## 2025-04-05 RX ADMIN — ALBUTEROL SULFATE 2.5 MG: 2.5 SOLUTION RESPIRATORY (INHALATION) at 00:30

## 2025-04-05 RX ADMIN — SODIUM CHLORIDE SOLN NEBU 3% 3 ML: 3 NEBU SOLN at 23:31

## 2025-04-05 RX ADMIN — CHLORHEXIDINE GLUCONATE 15 ML: 1.2 SOLUTION ORAL at 19:46

## 2025-04-05 RX ADMIN — AMLODIPINE BESYLATE 5 MG: 5 TABLET ORAL at 09:56

## 2025-04-05 RX ADMIN — ALBUTEROL SULFATE 2.5 MG: 2.5 SOLUTION RESPIRATORY (INHALATION) at 16:10

## 2025-04-05 RX ADMIN — PIPERACILLIN AND TAZOBACTAM 3.38 G: 3; .375 INJECTION, POWDER, FOR SOLUTION INTRAVENOUS at 12:26

## 2025-04-05 RX ADMIN — MAGNESIUM OXIDE TAB 400 MG (241.3 MG ELEMENTAL MG) 400 MG: 400 (241.3 MG) TAB at 09:56

## 2025-04-05 RX ADMIN — SENNOSIDES AND DOCUSATE SODIUM 1 TABLET: 50; 8.6 TABLET ORAL at 19:46

## 2025-04-05 RX ADMIN — PIPERACILLIN AND TAZOBACTAM 3.38 G: 3; .375 INJECTION, POWDER, FOR SOLUTION INTRAVENOUS at 21:43

## 2025-04-05 RX ADMIN — MAGNESIUM OXIDE TAB 400 MG (241.3 MG ELEMENTAL MG) 400 MG: 400 (241.3 MG) TAB at 06:26

## 2025-04-05 RX ADMIN — PIPERACILLIN AND TAZOBACTAM 3.38 G: 3; .375 INJECTION, POWDER, FOR SOLUTION INTRAVENOUS at 04:59

## 2025-04-05 RX ADMIN — INSULIN ASPART 3 UNITS: 100 INJECTION, SOLUTION INTRAVENOUS; SUBCUTANEOUS at 00:47

## 2025-04-05 RX ADMIN — SODIUM CHLORIDE SOLN NEBU 3% 3 ML: 3 NEBU SOLN at 00:30

## 2025-04-05 RX ADMIN — APIXABAN 10 MG: 5 TABLET, FILM COATED ORAL at 21:42

## 2025-04-05 RX ADMIN — ALBUTEROL SULFATE 2.5 MG: 2.5 SOLUTION RESPIRATORY (INHALATION) at 20:27

## 2025-04-05 RX ADMIN — INSULIN ASPART 1 UNITS: 100 INJECTION, SOLUTION INTRAVENOUS; SUBCUTANEOUS at 09:54

## 2025-04-05 RX ADMIN — ALBUTEROL SULFATE 2.5 MG: 2.5 SOLUTION RESPIRATORY (INHALATION) at 23:31

## 2025-04-05 RX ADMIN — ACETAMINOPHEN 975 MG: 325 TABLET ORAL at 21:42

## 2025-04-05 RX ADMIN — ACETAMINOPHEN 975 MG: 325 TABLET ORAL at 06:26

## 2025-04-05 RX ADMIN — POTASSIUM CHLORIDE 20 MEQ: 20 SOLUTION ORAL at 06:26

## 2025-04-05 RX ADMIN — ACETAMINOPHEN 975 MG: 325 TABLET ORAL at 14:33

## 2025-04-05 ASSESSMENT — ACTIVITIES OF DAILY LIVING (ADL)
ADLS_ACUITY_SCORE: 61
ADLS_ACUITY_SCORE: 81
ADLS_ACUITY_SCORE: 81
ADLS_ACUITY_SCORE: 64
ADLS_ACUITY_SCORE: 81
ADLS_ACUITY_SCORE: 61
ADLS_ACUITY_SCORE: 81
ADLS_ACUITY_SCORE: 61
ADLS_ACUITY_SCORE: 61
ADLS_ACUITY_SCORE: 81
ADLS_ACUITY_SCORE: 61
ADLS_ACUITY_SCORE: 81

## 2025-04-05 NOTE — PROGRESS NOTES
Patient transported to P3 with RN and NST on 15L oxymask, placed back on HFNC 50L 50%. No issues.     4/5/2025  Trish Diaz, RT

## 2025-04-05 NOTE — PLAN OF CARE
Goal Outcome Evaluation:       Report called to P3 . Pt to move to 331/ bed , tele , RT , mask 15lt . Personal items with pt.. family called updates given and aware pt moving to room 331.

## 2025-04-05 NOTE — PLAN OF CARE
"Goal Outcome Evaluation:      Plan of Care Reviewed With: patient    Overall Patient Progress: improvingOverall Patient Progress: improving    Outcome Evaluation: Stable, needing Bipap and HFNC overnight.    [facility] - ICU    RN Progress Note: [id number]            Pertinent Assessments:      Please refer to flowsheet rows for full assessment     Required HFNC and Bipap for a short period to maintain saturations greater than 90%. Does not like Bipap, states \"it's hard to breathe.\"           Key Events - This Shift:       none     RN Managed Protocols Ordered:  Yes  Protocols:Potassium, Magnesium, and Phosphorus  PRN'S: non  Protocols Status: Endorsed to Oncoming RN Potassium 3.7, oral replacement ordered with recheck in AM, Magnesium 1.7, oral replacement ordered with recheck in AM, Phosphorus 3.3, recheck in AM.                Barriers to Discharge / Downgrade:     Continues to use HFNC 40L at 50-55% FiO2 at night.          "

## 2025-04-05 NOTE — PROGRESS NOTES
INFECTIOUS DISEASE FOLLOW UP NOTE  Date: 04/05/2025     ================================================================  SUBJECTIVE  No events    ================================================================  OBJECTIVE  BP (!) 149/73   Pulse 70   Temp 98.4  F (36.9  C) (Axillary)   Resp 24   Wt 98.2 kg (216 lb 7.9 oz)   SpO2 98%   BMI 36.03 kg/m      Physical Exam  General: lethargic, no apparent distress  HEENT: atraumatic, normocephalic, no scleral icterus, moist mucus membranes, no cervical lymphadenopathy  Heart: Normal rate, regular rhythm  Lungs: good inspiratory effort  Abdomen: soft, non-tender, non-distended  Extremities: no peripheral edema. Buttock ulcer per below      Pertinent labs  CBC RESULTS:   Recent Labs   Lab Test 04/03/25  0417   WBC 11.3*   RBC 3.59*   HGB 11.0*   HCT 33.5*   MCV 93   MCH 30.6   MCHC 32.8   RDW 14.4           Imaging  3/22 CTA chest  1.  Very small pulmonary artery emboli to subsegmental branches of the left lower lobe. No right heart strain.  2.  Mild emphysematous changes of the lungs. No focal pulmonary consolidation or pleural effusion.  3.  Trace pericardial effusion.  4.  Small sliding-type hiatal hernia.     3/24 CTA chest  Exam positive for pulmonary embolism. There has been progression of emboli as seen previously. There is now involvement a segmental and subsegmental arteries of the right lower lobe. There is  now atelectasis of the entire right lower lobe with volume loss. There is some abnormal enhancement within portions of the right lower lobe which may be sequela of infarction/ischemia. There is opacification of bronchi within the right lower lobe. Mucous plugging may be present.12     3/24 CT A/P  There is gallbladder distention. A small amount of gallbladder sludge is possible but no calcified gallstones are seen and there is no biliary dilatation. There is diffuse subcutaneous edema. Small right inguinal hernia containing fat. No loops of bowel  are involved.     3/31 CT pelvis  Right inguinal hernia containing fat. Worsening subcutaneous edema and edema in the perineum and labia on the right as compared to previous examination. No discrete abscess is seen.     3/31 CT chest  1.  Complete right and near complete left lower lobe collapse.  2.  Mild groundglass density in the right middle lobe, nonspecific.        Microbiology data  3/17 flu/covid/rsv: negative  3/17 blood: NG  3/22 blood: NG  3/24 MRSA nares: negative  3/26 sputum culture: candida, GPCs  3/31 serum galactomannan: negative  3/31 beta-D glucan: normal  3/31 BAL              49 PMNs, 11% PMNs, 30% monocytes, 59% lining cells              Culture: NGTD              AFB culture: NGTD              Histoplasma Ag: negative              Nocardia culture: NGTD              Legionella: negative  4/1 blood: NGTD     I have personally reviewed the relevant laboratory, imaging, and microbiology data  ================================================================  Assessment  Marly Cannon is a 59 year old with perianal abscess.  PMH intellectual disability, COPD w/ severe obstruction, active tobacco use, obesity, ADEEL, secondary polycythemia, DM2, CKD3, HTN.     Recently admitted 3/18-3/20 for DKA + respiratory failure + condyloma acuminata.  Was discharged with insulin pump, 2L oxygen, acyclovir.  Presented again on 3/22 with chest pain, shortness of breath, found to have DKA, distributive shock, respiratory failure, PE.  She required intubation, and this hospital course was complicated by VAP and perianal abscess s/p I&D with penrose placement on 3/31.  Chest CT on 3/31 showed bilateral lobe collapse.  She has been on pip-tazo since 3/23.  Underwent BAL on 3/31, studies are pending.      For now will continue broad antibiotics to cover both GI/ dre as well as hospital-associated pulmonary pathogens.  Over the last 7 days some of her clinical parameters have improved (downtrending WBC count, now  extubated), but still significantly inflamed and with collapse of her lower lung lobes means duration of therapy will need to be extended.  In addition she continued to have feculant drainage from her perianal wound, so was taken back to the OR for I&D on 4/2.     Impression  # Perianal abscess              - s/p penrose drain placement 3/31              - s/p I&D 4/2  # Condyloma acuminata              - s/p acyclovir in mid-March  # Acute hypoxic respiratory failure  # RML pneumonia, ventilator associated  # Bilateral lower lobe collapse  # Segmental pulmonary embolus  # Severe COPD  # Poorly controlled DM2 (A1C 12)     ================================================================  Plan  - Continue pip-tazo  - Will likely repeat CT on 4/7 to reassess abdomen and pelvis  - ID will follow    Jeremy Cannon MD, MPH  [facility]   Office Telephone [phone].  Fax [phone]  [facility] paging

## 2025-04-05 NOTE — PROGRESS NOTES
[facility]    Medicine Progress Note - Hospitalist Service    Date of Admission:  3/31/2025    Assessment & Plan   59-year-old female patient who was transferred from [facility] on 3/31 for difficulty of weaning off mechanical ventilation.  She is also receiving treatment for perianal abscess.  Past medical history significant for hypertension,ckd3,  type 2 diabetes mellitus, intellectual disability, hyperparathyroidism, subsegmental PE, COPD on home o2, untreated ADEEL.     Acute hypoxic respiratory failure due to Ventilator associated pneumonia  - Underlying severe COPD  -Was extubated on 4/1 after prolonged mechanical ventilation  - Patient is now placed back on high flow nasal cannula at 40 L, 60% FiO2  -Continue Zosyn  -Taper oxygen as able  -Continue with respiratory hygiene and nebulization therapy   - BiPAP at night  - PT/OT  - cxr     Segmental and subsegmental PE  - was On heparin gtt,  switched to Eliquis 5 mg oral twice daily    Perianal abscess  Condyloma acuminata  - completed acyclovir in mid March  - s/p I&D with Penrose drain placement on 3/31.  - Continue Zosyn  - ID on board  - WO   - Plan to repeat CT pelvis 4/7    Type 2 diabetes mellitus, poorly controlled  S/p DKA treatment recently  - Hemoglobin A1c is 12.7  - increase to 17 units of Lantus subcutaneously  - high intensity insulin sliding scale  - Accu-Checks  - Hypoglycemia protocol    YADIRA on CKD stage III  - Baseline creatinine is around 1.1-1.3,-->1.56-->1.24  - Monitor    Anemia  - Most likely related to chronic medical illness  - Monitor          Diet: Snacks/Supplements Adult: Magic Cup; With Meals  Combination Diet Minced and Moist Diet (level 5); Thin Liquids (level 0)    DVT Prophylaxis: DOAC  Landrum Catheter: PRESENT, indication: Wound deterioration and failed external collection device  Lines: PRESENT      CVC Triple Lumen Right Internal jugular-Site Assessment: WDL      Cardiac Monitoring: ACTIVE  "order. Indication: ICU  Code Status: Full Code      Clinically Significant Risk Factors               # Hypoalbuminemia: Lowest albumin = 1.7 g/dL at 3/31/2025  5:35 PM, will monitor as appropriate     # Hypertension: Noted on problem list     # Acute Hypoxic Respiratory Failure: Documented O2 saturation < 90%. Continue supplemental oxygen as needed        # DMII: A1C = 12.7 % (Ref range: <5.7 %) within past 6 months   # Obesity: Estimated body mass index is 36.03 kg/m  as calculated from the following:    Height as of 3/22/25: 1.651 m (5' 5\").    Weight as of this encounter: 98.2 kg (216 lb 7.9 oz).      # COPD: noted on problem list        Social Drivers of Health    Tobacco Use: Medium Risk (4/2/2025)    Patient History     Smoking Tobacco Use: Former     Smokeless Tobacco Use: Never     Passive Exposure: Current   Physical Activity: Unknown (5/16/2024)    Exercise Vital Sign     Days of Exercise per Week: 0 days   Social Connections: Unknown (5/16/2024)    Social Connection and Isolation Panel [NHANES]     Frequency of Social Gatherings with Friends and Family: More than three times a week          Disposition Plan     Medically Ready for Discharge: Anticipated in 2-4 Days             Noam Schneider MD  Hospitalist Service  [facility]  Securely message with [employer] (more info)  Text page via [employer] Paging/Directory   ______________________________________________________________________    Interval History   Acutely sick looking, she is quite sleepy but easily arousable.  Yesterday she was on 3 L of oxygen but unfortunately she had to revert back to high flow nasal cannula.  She denies having worsening of breathing difficulty or chest pain.  Management plan discussed with patient and RN.    Physical Exam   Vital Signs: Temp: 98.4  F (36.9  C) Temp src: Axillary BP: (!) 149/73 Pulse: 70   Resp: 24 SpO2: 98 % O2 Device: High Flow Nasal Cannula (HFNC) Oxygen Delivery: 40 LPM  Weight: " 216 lbs 7.87 oz    General Appearance:  No distress noted  Respiratory: Good air entry bilaterally  Cardiovascular: S1 and S2 heard, no murmur or gallop  GI: Soft abdomen, right lower abdominal tenderness, normoactive bowel sounds  Skin: Intact and warm        Medical Decision Making       40 MINUTES SPENT BY ME on the date of service doing chart review, history, exam, documentation & further activities per the note.      Data

## 2025-04-05 NOTE — PROGRESS NOTES
General Surgery Progress Note:    Hospital Day # 5    ASSESSMENT:  1. Pressure injury of buttock, stage 2, unspecified laterality (H) [L89.302]    2. Perineal abscess        Marly Cannon is a 59 year old female PMH COPD on home O2, DM2 12.7% 3/22, recently admitted for DKA and acute hypoxic respiratory failure with LL lobe PE, complicated by VAP, DKA, chronic decubitus ulcer s/p bedside I&D and Penrose placement 3/31 s/p incision and drainage of NSTI 4/2 with Penrose placements.     PLAN:  -ADAT  -Appreciate excellent ICU cares and keeping area clean and dry  -Continue with dressing changes PRN over top and BID vashe moistened gauze packing  -Will consider WOC involvement / potential wound vac early next week  -Medical management per primary team    SUBJECTIVE:   Says she is doing well, RN just completed dressing change, said this was sore but went well. Per RN report, wound looks healthy no signs of necrosis.     VITALS RANGE:  Temp:  [98.1  F (36.7  C)-98.7  F (37.1  C)] 98.7  F (37.1  C)  Pulse:  [68-79] 75  Resp:  [17-25] 19  BP: (125-159)/(63-94) 145/65  FiO2 (%):  [45 %-60 %] 50 %  SpO2:  [89 %-100 %] 92 %    PHYSICAL EXAM:  General: patient seen resting in bed in no acute distress  Resp: on bipap  Right inguinal area wound: dressing in place, did not take down today  Extremities: No edema or cyanosis visualized on exam, no obvious deformities    04/04 0700 - 04/05 0659  In: 460 [P.O.:360]  Out: 2550 [Urine:2550]    No results displayed because visit has over 200 results.           ANNETTE Kinsey [facility]  [facility] - [address]  [address]  [place] [address]?  Office: [phone]

## 2025-04-05 NOTE — PLAN OF CARE
"Goal Outcome Evaluation:    Transferred from ICU.  Pt alert and oriented x3-4; forgetful at times. Reorientation done.  Denied SOB and chest pain. VSS. On HFNC at 50/50%.  Pt placed on bariatric bed with LLA.  Dressing due for change tonight. Foam dressing on buttocks.  Repositioning done as allowed. Able to tolerate diet order.  Call light within reach, make needs known.    Problem: Adult Inpatient Plan of Care  Goal: Plan of Care Review  Description: The Plan of Care Review/Shift note should be completed every shift.  The Outcome Evaluation is a brief statement about your assessment that the patient is improving, declining, or no change.  This information will be displayed automatically on your shiftnote.  4/5/2025 1812 by Tristan Pettit RN  Outcome: Progressing  4/5/2025 1458 by Tristan Pettit, RN  Outcome: Progressing  Goal: Patient-Specific Goal (Individualized)  Description: You can add care plan individualizations to a care plan. Examples of Individualization might be:  \"Parent requests to be called daily at 9am for status\", \"I have a hard time hearing out of my right ear\", or \"Do not touch me to wake me up as it startlesme\".  4/5/2025 1812 by Tristan Pettit RN  Outcome: Progressing  4/5/2025 1458 by Tristan Pettit, RN  Outcome: Progressing  Goal: Absence of Hospital-Acquired Illness or Injury  4/5/2025 1812 by Tristan Pettit, RN  Outcome: Progressing  4/5/2025 1458 by Tristan Pettit RN  Outcome: Progressing  Intervention: Identify and Manage Fall Risk  Recent Flowsheet Documentation  Taken 4/5/2025 1510 by Tristan Pettit, RN  Safety Promotion/Fall Prevention:   patient and family education   nonskid shoes/slippers when out of bed   lighting adjusted   increase visualization of patient  Intervention: Prevent and Manage VTE (Venous Thromboembolism) Risk  Recent Flowsheet Documentation  Taken 4/5/2025 1510 by Tristan Pettit, RN  VTE Prevention/Management: SCDs on (sequential compression " devices)  Goal: Optimal Comfort and Wellbeing  4/5/2025 1812 by Tristan Pettit, RN  Outcome: Progressing  4/5/2025 1458 by Tristan Pettit, RN  Outcome: Progressing  Goal: Readiness for Transition of Care  4/5/2025 1812 by Tristan Pettit, RN  Outcome: Progressing  4/5/2025 1458 by Tristan Pettit, RN  Outcome: Progressing  Intervention: Mutually Develop Transition Plan  Recent Flowsheet Documentation  Taken 4/5/2025 1400 by Tristan Pettit, RN  Equipment Currently Used at Home: none

## 2025-04-06 ENCOUNTER — APPOINTMENT (OUTPATIENT)
Dept: SPEECH THERAPY | Facility: HOSPITAL | Age: 60
DRG: 981 | End: 2025-04-06
Attending: STUDENT IN AN ORGANIZED HEALTH CARE EDUCATION/TRAINING PROGRAM
Payer: COMMERCIAL

## 2025-04-06 LAB
ANION GAP SERPL CALCULATED.3IONS-SCNC: 2 MMOL/L (ref 7–15)
BACTERIA BLD CULT: NO GROWTH
BACTERIA BLD CULT: NO GROWTH
BUN SERPL-MCNC: 17.7 MG/DL (ref 8–23)
CALCIUM SERPL-MCNC: 8.9 MG/DL (ref 8.8–10.4)
CHLORIDE SERPL-SCNC: 99 MMOL/L (ref 98–107)
CREAT SERPL-MCNC: 1.18 MG/DL (ref 0.51–0.95)
EGFRCR SERPLBLD CKD-EPI 2021: 52 ML/MIN/1.73M2
ERYTHROCYTE [DISTWIDTH] IN BLOOD BY AUTOMATED COUNT: 13.7 % (ref 10–15)
FOLATE SERPL-MCNC: 4.9 NG/ML (ref 4.6–34.8)
GLUCOSE BLDC GLUCOMTR-MCNC: 109 MG/DL (ref 70–99)
GLUCOSE BLDC GLUCOMTR-MCNC: 109 MG/DL (ref 70–99)
GLUCOSE BLDC GLUCOMTR-MCNC: 131 MG/DL (ref 70–99)
GLUCOSE BLDC GLUCOMTR-MCNC: 199 MG/DL (ref 70–99)
GLUCOSE BLDC GLUCOMTR-MCNC: 85 MG/DL (ref 70–99)
GLUCOSE BLDC GLUCOMTR-MCNC: 99 MG/DL (ref 70–99)
GLUCOSE SERPL-MCNC: 110 MG/DL (ref 70–99)
HCO3 SERPL-SCNC: 38 MMOL/L (ref 22–29)
HCT VFR BLD AUTO: 31.2 % (ref 35–47)
HGB BLD-MCNC: 10.1 G/DL (ref 11.7–15.7)
MAGNESIUM SERPL-MCNC: 1.6 MG/DL (ref 1.7–2.3)
MCH RBC QN AUTO: 30.3 PG (ref 26.5–33)
MCHC RBC AUTO-ENTMCNC: 32.4 G/DL (ref 31.5–36.5)
MCV RBC AUTO: 94 FL (ref 78–100)
PHOSPHATE SERPL-MCNC: 3.2 MG/DL (ref 2.5–4.5)
PLATELET # BLD AUTO: 282 10E3/UL (ref 150–450)
POTASSIUM SERPL-SCNC: 4.2 MMOL/L (ref 3.4–5.3)
RBC # BLD AUTO: 3.33 10E6/UL (ref 3.8–5.2)
SODIUM SERPL-SCNC: 139 MMOL/L (ref 135–145)
WBC # BLD AUTO: 7.3 10E3/UL (ref 4–11)

## 2025-04-06 PROCEDURE — 80048 BASIC METABOLIC PNL TOTAL CA: CPT | Performed by: PHYSICIAN ASSISTANT

## 2025-04-06 PROCEDURE — 82746 ASSAY OF FOLIC ACID SERUM: CPT | Performed by: STUDENT IN AN ORGANIZED HEALTH CARE EDUCATION/TRAINING PROGRAM

## 2025-04-06 PROCEDURE — 94640 AIRWAY INHALATION TREATMENT: CPT

## 2025-04-06 PROCEDURE — 250N000013 HC RX MED GY IP 250 OP 250 PS 637: Performed by: STUDENT IN AN ORGANIZED HEALTH CARE EDUCATION/TRAINING PROGRAM

## 2025-04-06 PROCEDURE — 99232 SBSQ HOSP IP/OBS MODERATE 35: CPT | Performed by: STUDENT IN AN ORGANIZED HEALTH CARE EDUCATION/TRAINING PROGRAM

## 2025-04-06 PROCEDURE — 250N000013 HC RX MED GY IP 250 OP 250 PS 637: Performed by: PHYSICIAN ASSISTANT

## 2025-04-06 PROCEDURE — 99232 SBSQ HOSP IP/OBS MODERATE 35: CPT | Performed by: INTERNAL MEDICINE

## 2025-04-06 PROCEDURE — 210N000001 HC R&B IMCU HEART CARE

## 2025-04-06 PROCEDURE — 85027 COMPLETE CBC AUTOMATED: CPT | Performed by: PHYSICIAN ASSISTANT

## 2025-04-06 PROCEDURE — 999N000157 HC STATISTIC RCP TIME EA 10 MIN

## 2025-04-06 PROCEDURE — 250N000011 HC RX IP 250 OP 636: Mod: JZ | Performed by: PHYSICIAN ASSISTANT

## 2025-04-06 PROCEDURE — 250N000009 HC RX 250: Performed by: STUDENT IN AN ORGANIZED HEALTH CARE EDUCATION/TRAINING PROGRAM

## 2025-04-06 PROCEDURE — 99024 POSTOP FOLLOW-UP VISIT: CPT | Performed by: PHYSICIAN ASSISTANT

## 2025-04-06 PROCEDURE — 94640 AIRWAY INHALATION TREATMENT: CPT | Mod: 76

## 2025-04-06 PROCEDURE — 83735 ASSAY OF MAGNESIUM: CPT | Performed by: PHYSICIAN ASSISTANT

## 2025-04-06 PROCEDURE — 84100 ASSAY OF PHOSPHORUS: CPT | Performed by: STUDENT IN AN ORGANIZED HEALTH CARE EDUCATION/TRAINING PROGRAM

## 2025-04-06 PROCEDURE — 92526 ORAL FUNCTION THERAPY: CPT | Mod: GN | Performed by: SPEECH-LANGUAGE PATHOLOGIST

## 2025-04-06 PROCEDURE — 94660 CPAP INITIATION&MGMT: CPT

## 2025-04-06 PROCEDURE — 250N000011 HC RX IP 250 OP 636: Performed by: INTERNAL MEDICINE

## 2025-04-06 PROCEDURE — 94799 UNLISTED PULMONARY SVC/PX: CPT

## 2025-04-06 RX ORDER — MAGNESIUM OXIDE 400 MG/1
400 TABLET ORAL EVERY 4 HOURS
Status: COMPLETED | OUTPATIENT
Start: 2025-04-06 | End: 2025-04-06

## 2025-04-06 RX ADMIN — PIPERACILLIN AND TAZOBACTAM 3.38 G: 3; .375 INJECTION, POWDER, FOR SOLUTION INTRAVENOUS at 04:51

## 2025-04-06 RX ADMIN — HYDROMORPHONE HYDROCHLORIDE 0.2 MG: 0.2 INJECTION, SOLUTION INTRAMUSCULAR; INTRAVENOUS; SUBCUTANEOUS at 11:37

## 2025-04-06 RX ADMIN — MAGNESIUM OXIDE TAB 400 MG (241.3 MG ELEMENTAL MG) 400 MG: 400 (241.3 MG) TAB at 06:33

## 2025-04-06 RX ADMIN — SENNOSIDES AND DOCUSATE SODIUM 1 TABLET: 50; 8.6 TABLET ORAL at 20:51

## 2025-04-06 RX ADMIN — PIPERACILLIN AND TAZOBACTAM 3.38 G: 3; .375 INJECTION, POWDER, FOR SOLUTION INTRAVENOUS at 13:57

## 2025-04-06 RX ADMIN — APIXABAN 10 MG: 5 TABLET, FILM COATED ORAL at 08:59

## 2025-04-06 RX ADMIN — ALBUTEROL SULFATE 2.5 MG: 2.5 SOLUTION RESPIRATORY (INHALATION) at 20:19

## 2025-04-06 RX ADMIN — ALBUTEROL SULFATE 2.5 MG: 2.5 SOLUTION RESPIRATORY (INHALATION) at 15:58

## 2025-04-06 RX ADMIN — ACETAMINOPHEN 975 MG: 325 TABLET ORAL at 21:00

## 2025-04-06 RX ADMIN — AMLODIPINE BESYLATE 5 MG: 5 TABLET ORAL at 08:59

## 2025-04-06 RX ADMIN — SODIUM CHLORIDE SOLN NEBU 3% 3 ML: 3 NEBU SOLN at 07:35

## 2025-04-06 RX ADMIN — ALBUTEROL SULFATE 2.5 MG: 2.5 SOLUTION RESPIRATORY (INHALATION) at 12:08

## 2025-04-06 RX ADMIN — ACETAMINOPHEN 975 MG: 325 TABLET ORAL at 06:32

## 2025-04-06 RX ADMIN — ALBUTEROL SULFATE 2.5 MG: 2.5 SOLUTION RESPIRATORY (INHALATION) at 07:34

## 2025-04-06 RX ADMIN — MAGNESIUM OXIDE TAB 400 MG (241.3 MG ELEMENTAL MG) 400 MG: 400 (241.3 MG) TAB at 11:19

## 2025-04-06 RX ADMIN — SODIUM CHLORIDE SOLN NEBU 3% 3 ML: 3 NEBU SOLN at 15:58

## 2025-04-06 RX ADMIN — PIPERACILLIN AND TAZOBACTAM 3.38 G: 3; .375 INJECTION, POWDER, FOR SOLUTION INTRAVENOUS at 20:51

## 2025-04-06 RX ADMIN — SENNOSIDES AND DOCUSATE SODIUM 1 TABLET: 50; 8.6 TABLET ORAL at 08:59

## 2025-04-06 RX ADMIN — APIXABAN 10 MG: 5 TABLET, FILM COATED ORAL at 20:50

## 2025-04-06 RX ADMIN — ALBUTEROL SULFATE 2.5 MG: 2.5 SOLUTION RESPIRATORY (INHALATION) at 04:37

## 2025-04-06 ASSESSMENT — ACTIVITIES OF DAILY LIVING (ADL)
ADLS_ACUITY_SCORE: 57
ADLS_ACUITY_SCORE: 61
ADLS_ACUITY_SCORE: 57
ADLS_ACUITY_SCORE: 61
ADLS_ACUITY_SCORE: 57
ADLS_ACUITY_SCORE: 61
ADLS_ACUITY_SCORE: 57
ADLS_ACUITY_SCORE: 61
ADLS_ACUITY_SCORE: 57
ADLS_ACUITY_SCORE: 61
ADLS_ACUITY_SCORE: 57
ADLS_ACUITY_SCORE: 61
ADLS_ACUITY_SCORE: 61

## 2025-04-06 NOTE — PLAN OF CARE
Problem: Comorbidity Management  Goal: Blood Pressure in Desired Range  4/6/2025 0518 by Basilia Junior RN  Outcome: Progressing  4/5/2025 2237 by Basilia Junior RN  Outcome: Progressing  Intervention: Maintain Blood Pressure Management  Recent Flowsheet Documentation  Taken 4/6/2025 0435 by Basilia Junior RN  Medication Review/Management: medications reviewed  Taken 4/6/2025 0052 by Basilia Junior RN  Medication Review/Management: medications reviewed  Taken 4/5/2025 1958 by Basilia Junior RN  Medication Review/Management: medications reviewed     Problem: Fall Injury Risk  Goal: Absence of Fall and Fall-Related Injury  4/6/2025 0518 by Basilia Junior RN  Outcome: Progressing  4/5/2025 2237 by Basilia Junior RN  Outcome: Progressing  Intervention: Identify and Manage Contributors  Recent Flowsheet Documentation  Taken 4/6/2025 0435 by Basilia Junior RN  Medication Review/Management: medications reviewed  Taken 4/6/2025 0052 by Basilia Junior RN  Medication Review/Management: medications reviewed  Taken 4/5/2025 1958 by Basilia Junior RN  Medication Review/Management: medications reviewed  Intervention: Promote Injury-Free Environment  Recent Flowsheet Documentation  Taken 4/6/2025 0435 by Basilia Junior RN  Safety Promotion/Fall Prevention:   patient and family education   nonskid shoes/slippers when out of bed   lighting adjusted   increase visualization of patient  Taken 4/6/2025 0052 by Basilia Junior RN  Safety Promotion/Fall Prevention:   patient and family education   nonskid shoes/slippers when out of bed   lighting adjusted   increase visualization of patient  Taken 4/5/2025 1958 by Basilia Junior RN  Safety Promotion/Fall Prevention:   patient and family education   nonskid shoes/slippers when out of bed   lighting adjusted   increase visualization of patient     Problem: Pain Acute  Goal: Optimal Pain Control and Function  4/6/2025 0518 by  Basilia Junior RN  Outcome: Progressing  4/5/2025 2237 by Basilia Junior RN  Outcome: Progressing  Intervention: Prevent or Manage Pain  Recent Flowsheet Documentation  Taken 4/6/2025 0435 by Basilia Junior RN  Medication Review/Management: medications reviewed  Taken 4/6/2025 0052 by Basilia Junior RN  Medication Review/Management: medications reviewed  Taken 4/5/2025 1958 by Basilia Junior RN  Medication Review/Management: medications reviewed     Problem: Infection  Goal: Absence of Infection Signs and Symptoms  4/6/2025 0518 by Basilia Junior RN  Outcome: Progressing  4/5/2025 2237 by Basilia Junior RN  Outcome: Progressing   Goal Outcome Evaluation:  Pt is A0x4, VSS, denies pain. On HFNC 50LPM, BIPAP worn for about an hour before pt requested that writer takes it off. Pt states she felt like she couldn't breathe properly.   Repositioned Q2H & PRN  Perineal and anterior groin dressing changed during shift. Zosyn running. Call light within reach, pt calls appropriately.

## 2025-04-06 NOTE — PROGRESS NOTES
INFECTIOUS DISEASE FOLLOW UP NOTE  Date: 04/06/2025     ================================================================  SUBJECTIVE  No events    ================================================================  OBJECTIVE  BP (!) 144/77 (BP Location: Left arm)   Pulse 74   Temp 98.3  F (36.8  C) (Oral)   Resp 18   Wt 109.4 kg (241 lb 2.9 oz)   SpO2 98%   BMI 40.13 kg/m      Physical Exam  General: lethargic, no apparent distress  HEENT: atraumatic, normocephalic, no scleral icterus, moist mucus membranes, no cervical lymphadenopathy  Heart: Normal rate, regular rhythm  Lungs: good inspiratory effort  Abdomen: soft, non-tender, non-distended  Extremities: no peripheral edema. Buttock ulcer per below      Pertinent labs  CBC RESULTS:   Recent Labs   Lab Test 04/03/25  0417   WBC 11.3*   RBC 3.59*   HGB 11.0*   HCT 33.5*   MCV 93   MCH 30.6   MCHC 32.8   RDW 14.4           Imaging  3/22 CTA chest  1.  Very small pulmonary artery emboli to subsegmental branches of the left lower lobe. No right heart strain.  2.  Mild emphysematous changes of the lungs. No focal pulmonary consolidation or pleural effusion.  3.  Trace pericardial effusion.  4.  Small sliding-type hiatal hernia.     3/24 CTA chest  Exam positive for pulmonary embolism. There has been progression of emboli as seen previously. There is now involvement a segmental and subsegmental arteries of the right lower lobe. There is  now atelectasis of the entire right lower lobe with volume loss. There is some abnormal enhancement within portions of the right lower lobe which may be sequela of infarction/ischemia. There is opacification of bronchi within the right lower lobe. Mucous plugging may be present.12     3/24 CT A/P  There is gallbladder distention. A small amount of gallbladder sludge is possible but no calcified gallstones are seen and there is no biliary dilatation. There is diffuse subcutaneous edema. Small right inguinal hernia containing  fat. No loops of bowel are involved.     3/31 CT pelvis  Right inguinal hernia containing fat. Worsening subcutaneous edema and edema in the perineum and labia on the right as compared to previous examination. No discrete abscess is seen.     3/31 CT chest  1.  Complete right and near complete left lower lobe collapse.  2.  Mild groundglass density in the right middle lobe, nonspecific.        Microbiology data  3/17 flu/covid/rsv: negative  3/17 blood: NG  3/22 blood: NG  3/24 MRSA nares: negative  3/26 sputum culture: candida, GPCs  3/31 serum galactomannan: negative  3/31 beta-D glucan: normal  3/31 BAL              49 PMNs, 11% PMNs, 30% monocytes, 59% lining cells              Culture: NGTD              AFB culture: NGTD              Histoplasma Ag: negative              Nocardia culture: NGTD              Legionella: negative  4/1 blood: NGTD     I have personally reviewed the relevant laboratory, imaging, and microbiology data  ================================================================  Assessment  Marly Cannon is a 59 year old with perianal abscess.  PMH intellectual disability, COPD w/ severe obstruction, active tobacco use, obesity, ADEEL, secondary polycythemia, DM2, CKD3, HTN.     Recently admitted 3/18-3/20 for DKA + respiratory failure + condyloma acuminata.  Was discharged with insulin pump, 2L oxygen, acyclovir.  Presented again on 3/22 with chest pain, shortness of breath, found to have DKA, distributive shock, respiratory failure, PE.  She required intubation, and this hospital course was complicated by VAP and perianal abscess s/p I&D with penrose placement on 3/31.  Chest CT on 3/31 showed bilateral lobe collapse.  She has been on pip-tazo since 3/23.  Underwent BAL on 3/31, studies are pending.      For now will continue broad antibiotics to cover both GI/ dre as well as hospital-associated pulmonary pathogens.  Over the last 7 days some of her clinical parameters have improved  (downtrending WBC count, now extubated), but still significantly inflamed and with collapse of her lower lung lobes means duration of therapy will need to be extended.  In addition she continued to have feculant drainage from her perianal wound, so was taken back to the OR for I&D on 4/2.     Impression  # Perianal abscess              - s/p penrose drain placement 3/31              - s/p I&D 4/2  # Condyloma acuminata              - s/p acyclovir in mid-March  # Acute hypoxic respiratory failure  # RML pneumonia, ventilator associated  # Bilateral lower lobe collapse  # Segmental pulmonary embolus  # Severe COPD  # Poorly controlled DM2 (A1C 12)     ================================================================  Plan  - Continue pip-tazo  - Plan to repeat CT on 4/7 to reassess abdomen and pelvis  - ID will follow    Jeremy Cannon MD, MPH  [facility]   Office Telephone [phone].  Fax [phone]  [facility] paging

## 2025-04-06 NOTE — PROGRESS NOTES
[facility]    Medicine Progress Note - Hospitalist Service    Date of Admission:  3/31/2025    Assessment & Plan   59-year-old female patient who was transferred from [facility] on 3/31 for difficulty of weaning off mechanical ventilation.  She is also receiving treatment for perianal abscess. Past medical history significant for hypertension,ckd3,  type 2 diabetes mellitus, intellectual disability, hyperparathyroidism, subsegmental PE, COPD on home O2, untreated ADEEL.     Acute hypoxic respiratory failure due to Ventilator associated pneumonia  -Underlying severe COPD  -Was extubated on 4/1 after prolonged mechanical ventilation  - Patient is now placed back on high flow nasal cannula at 45 L, 50% FiO2  -Continue Zosyn  -Taper oxygen as able  -Continue with respiratory hygiene and nebulization therapy   - BiPAP at night  - PT/OT    Segmental and subsegmental PE  - was On heparin gtt,  switched to Eliquis 5 mg oral twice daily    Perianal abscess  Condyloma acuminata  - completed acyclovir in mid March  - s/p I&D with Penrose drain placement on 3/31.  - Continue Zosyn  - ID on board  - WOC   - Plan to repeat CT pelvis 4/7    Type 2 diabetes mellitus, poorly controlled  S/p DKA treatment recently  - Hemoglobin A1c is 12.7  - Lantus 15 units subcutaneously  - high intensity insulin sliding scale  - Accu-Checks  - Hypoglycemia protocol    YADIRA on CKD stage III  - Baseline creatinine is around 1.1-1.3,-->1.56-->1.24  - Monitor    Anemia  - Most likely related to chronic medical illness  - Monitor          Diet: Snacks/Supplements Adult: Magic Cup; With Meals  Combination Diet Minced and Moist Diet (level 5); Thin Liquids (level 0)    DVT Prophylaxis: DOAC  Landrum Catheter: PRESENT, indication: Wound deterioration and failed external collection device  Lines: PRESENT      CVC Triple Lumen Right Internal jugular-Site Assessment: WDL      Cardiac Monitoring: ACTIVE order. Indication: ICU  Code  "Status: Full Code      Clinically Significant Risk Factors             # Hypomagnesemia: Lowest Mg = 1.6 mg/dL in last 2 days, will replace as needed   # Hypoalbuminemia: Lowest albumin = 1.7 g/dL at 3/31/2025  5:35 PM, will monitor as appropriate     # Hypertension: Noted on problem list     # Acute Hypoxic Respiratory Failure: Documented O2 saturation < 90%. Continue supplemental oxygen as needed        # DMII: A1C = 12.7 % (Ref range: <5.7 %) within past 6 months   # Severe Obesity: Estimated body mass index is 40.13 kg/m  as calculated from the following:    Height as of 3/22/25: 1.651 m (5' 5\").    Weight as of this encounter: 109.4 kg (241 lb 2.9 oz).      # COPD: noted on problem list        Social Drivers of Health    Tobacco Use: Medium Risk (4/2/2025)    Patient History     Smoking Tobacco Use: Former     Smokeless Tobacco Use: Never     Passive Exposure: Current   Physical Activity: Unknown (5/16/2024)    Exercise Vital Sign     Days of Exercise per Week: 0 days   Social Connections: Unknown (5/16/2024)    Social Connection and Isolation Panel [NHANES]     Frequency of Social Gatherings with Friends and Family: More than three times a week          Disposition Plan     Medically Ready for Discharge: Anticipated in 2-4 Days         Noam Schneider MD  Hospitalist Service  [facility]  Securely message with [employer] (more info)  Text page via [employer] Paging/Directory   ______________________________________________________________________    Interval History   No distress noted.  Acutely sick looking.  Has very poor oral intake.  Alert and oriented x 2.  Management plan discussed with the patient and she expressed understanding.    Physical Exam   Vital Signs: Temp: 98.3  F (36.8  C) Temp src: Oral BP: (!) 144/77 Pulse: 74   Resp: 18 SpO2: 98 % O2 Device: High Flow Nasal Cannula (HFNC) Oxygen Delivery: 45 LPM  Weight: 241 lbs 2.93 oz    General Appearance:  No distress " noted  Respiratory: Good air entry bilaterally  Cardiovascular: S1 and S2 heard, no murmur or gallop  GI: Soft abdomen, right lower abdominal tenderness, normoactive bowel sounds  Skin: Intact and warm    Medical Decision Making       40 MINUTES SPENT BY ME on the date of service doing chart review, history, exam, documentation & further activities per the note.      Data

## 2025-04-06 NOTE — PLAN OF CARE
Problem: Comorbidity Management  Goal: Blood Pressure in Desired Range  Outcome: Progressing  Intervention: Maintain Blood Pressure Management  Recent Flowsheet Documentation  Taken 4/5/2025 1958 by Basilia Junior RN  Medication Review/Management: medications reviewed     Problem: Fall Injury Risk  Goal: Absence of Fall and Fall-Related Injury  Outcome: Progressing  Intervention: Identify and Manage Contributors  Recent Flowsheet Documentation  Taken 4/5/2025 1958 by Basilia Junior RN  Medication Review/Management: medications reviewed  Intervention: Promote Injury-Free Environment  Recent Flowsheet Documentation  Taken 4/5/2025 1958 by Basilia Junior RN  Safety Promotion/Fall Prevention:   patient and family education   nonskid shoes/slippers when out of bed   lighting adjusted   increase visualization of patient     Problem: Pain Acute  Goal: Optimal Pain Control and Function  Outcome: Progressing  Intervention: Prevent or Manage Pain  Recent Flowsheet Documentation  Taken 4/5/2025 1958 by Basilia Junior RN  Medication Review/Management: medications reviewed     Problem: Infection  Goal: Absence of Infection Signs and Symptoms  Outcome: Progressing   Goal Outcome Evaluation:  Pt is A0x4, VSS, denies chest pain. On HFNC 50LPM, awaiting RT to change to BIPAP. Perineal  dressing/ anterior groin changed. Zosyn running. Call light within reach, pt calls appropriately.

## 2025-04-06 NOTE — PROGRESS NOTES
General Surgery Progress Note:    Hospital Day # 6    ASSESSMENT:  1. Pressure injury of buttock, stage 2, unspecified laterality (H) [L89.302]    2. Perineal abscess        Marly Cannon is a 59 year old female PMH COPD on home O2, DM2 12.7% 3/22, recently admitted for DKA and acute hypoxic respiratory failure with LL lobe PE, complicated by VAP, DKA, chronic decubitus ulcer s/p bedside I&D and Penrose placement 3/31 s/p incision and drainage of NSTI 4/2 with Penrose placements.     PLAN:  -ADAT  -Appreciate excellent ICU cares and keeping area clean and dry  -Continue with dressing changes PRN over top and BID vashe moistened gauze packing  -Will consider WOC involvement / potential wound vac early next week  -Medical management per primary team    SUBJECTIVE:   Doing ok, pain tolerable, working on breathing still, out of ICU    VITALS RANGE:  Temp:  [97.7  F (36.5  C)-98.7  F (37.1  C)] 98.3  F (36.8  C)  Pulse:  [68-78] 75  Resp:  [18-22] 18  BP: (129-150)/(65-77) 144/77  FiO2 (%):  [50 %] 50 %  SpO2:  [92 %-99 %] 99 %    PHYSICAL EXAM:  General: patient seen resting in bed in no acute distress  Resp: on bipap  Right inguinal area wound: dressing and packing removed, scattered areas of debris, no areas of diya necrosis, tissue otherwise healthy appearing, re-packed with vashe soak kerlix and covered with ABD  Extremities: No edema or cyanosis visualized on exam, no obvious deformities    04/05 0700 - 04/06 0659  In: 360 [P.O.:360]  Out: 2525 [Urine:2525]    No results displayed because visit has over 200 results.           Jarad Jones PA-C  [facility]  Surgery [facility] - [address]  [address]  [place] [address]?  Office: [phone]

## 2025-04-06 NOTE — PLAN OF CARE
"Goal Outcome Evaluation:    Pt disoriented to time and situation. Denied SOB and chest pain. VSS.  On HFNC at 45/55%. Repositioning Q2H as allowed. Internal jugular cath dressing change done.  Right groin wound dressing done by surgery team this shift.  Landrum in place draining to clear colored urine. Diet tolerated as ordered.  Call light within reach, calls appropriately.    Problem: Adult Inpatient Plan of Care  Goal: Plan of Care Review  Description: The Plan of Care Review/Shift note should be completed every shift.  The Outcome Evaluation is a brief statement about your assessment that the patient is improving, declining, or no change.  This information will be displayed automatically on your shiftnote.  Outcome: Progressing  Goal: Patient-Specific Goal (Individualized)  Description: You can add care plan individualizations to a care plan. Examples of Individualization might be:  \"Parent requests to be called daily at 9am for status\", \"I have a hard time hearing out of my right ear\", or \"Do not touch me to wake me up as it startlesme\".  Outcome: Progressing  Goal: Absence of Hospital-Acquired Illness or Injury  Outcome: Progressing  Intervention: Identify and Manage Fall Risk  Recent Flowsheet Documentation  Taken 4/6/2025 1615 by Tristan Pettit RN  Safety Promotion/Fall Prevention:   increase visualization of patient   patient and family education  Taken 4/6/2025 0930 by Tristan Pettit RN  Safety Promotion/Fall Prevention:   increase visualization of patient   patient and family education  Intervention: Prevent Skin Injury  Recent Flowsheet Documentation  Taken 4/6/2025 1615 by Tristan Pettit RN  Body Position: heels elevated  Taken 4/6/2025 1510 by Tristan Pettit RN  Body Position:   turned   right  Taken 4/6/2025 1318 by Tristan Pettit, RN  Body Position:   turned   left  Taken 4/6/2025 1138 by Tristan Pettit RN  Body Position: (sleepy) refuses positioning  Taken 4/6/2025 0930 by Tristan Pettit, " RN  Body Position: heels elevated  Intervention: Prevent and Manage VTE (Venous Thromboembolism) Risk  Recent Flowsheet Documentation  Taken 4/6/2025 1615 by Tristan Pettit RN  VTE Prevention/Management: SCDs on (sequential compression devices)  Taken 4/6/2025 0930 by Tristan Pettit, RN  VTE Prevention/Management: SCDs on (sequential compression devices)  Goal: Optimal Comfort and Wellbeing  Outcome: Progressing  Goal: Readiness for Transition of Care  Outcome: Progressing

## 2025-04-07 ENCOUNTER — APPOINTMENT (OUTPATIENT)
Dept: CT IMAGING | Facility: HOSPITAL | Age: 60
DRG: 981 | End: 2025-04-07
Attending: INTERNAL MEDICINE
Payer: COMMERCIAL

## 2025-04-07 ENCOUNTER — APPOINTMENT (OUTPATIENT)
Dept: OCCUPATIONAL THERAPY | Facility: HOSPITAL | Age: 60
DRG: 981 | End: 2025-04-07
Attending: STUDENT IN AN ORGANIZED HEALTH CARE EDUCATION/TRAINING PROGRAM
Payer: COMMERCIAL

## 2025-04-07 ENCOUNTER — APPOINTMENT (OUTPATIENT)
Dept: PHYSICAL THERAPY | Facility: HOSPITAL | Age: 60
DRG: 981 | End: 2025-04-07
Attending: STUDENT IN AN ORGANIZED HEALTH CARE EDUCATION/TRAINING PROGRAM
Payer: COMMERCIAL

## 2025-04-07 LAB
ANION GAP SERPL CALCULATED.3IONS-SCNC: 4 MMOL/L (ref 7–15)
BUN SERPL-MCNC: 14.3 MG/DL (ref 8–23)
CALCIUM SERPL-MCNC: 9.1 MG/DL (ref 8.8–10.4)
CHLORIDE SERPL-SCNC: 100 MMOL/L (ref 98–107)
CREAT SERPL-MCNC: 1.15 MG/DL (ref 0.51–0.95)
EGFRCR SERPLBLD CKD-EPI 2021: 54 ML/MIN/1.73M2
ERYTHROCYTE [DISTWIDTH] IN BLOOD BY AUTOMATED COUNT: 13.7 % (ref 10–15)
GLUCOSE BLDC GLUCOMTR-MCNC: 110 MG/DL (ref 70–99)
GLUCOSE BLDC GLUCOMTR-MCNC: 162 MG/DL (ref 70–99)
GLUCOSE BLDC GLUCOMTR-MCNC: 202 MG/DL (ref 70–99)
GLUCOSE BLDC GLUCOMTR-MCNC: 251 MG/DL (ref 70–99)
GLUCOSE SERPL-MCNC: 94 MG/DL (ref 70–99)
HCO3 SERPL-SCNC: 38 MMOL/L (ref 22–29)
HCT VFR BLD AUTO: 33 % (ref 35–47)
HGB BLD-MCNC: 10.7 G/DL (ref 11.7–15.7)
MAGNESIUM SERPL-MCNC: 1.5 MG/DL (ref 1.7–2.3)
MAGNESIUM SERPL-MCNC: 2.1 MG/DL (ref 1.7–2.3)
MCH RBC QN AUTO: 30.6 PG (ref 26.5–33)
MCHC RBC AUTO-ENTMCNC: 32.4 G/DL (ref 31.5–36.5)
MCV RBC AUTO: 94 FL (ref 78–100)
PHOSPHATE SERPL-MCNC: 3.9 MG/DL (ref 2.5–4.5)
PLATELET # BLD AUTO: 262 10E3/UL (ref 150–450)
POTASSIUM SERPL-SCNC: 4.2 MMOL/L (ref 3.4–5.3)
RBC # BLD AUTO: 3.5 10E6/UL (ref 3.8–5.2)
SODIUM SERPL-SCNC: 142 MMOL/L (ref 135–145)
WBC # BLD AUTO: 6.9 10E3/UL (ref 4–11)

## 2025-04-07 PROCEDURE — 250N000011 HC RX IP 250 OP 636: Performed by: STUDENT IN AN ORGANIZED HEALTH CARE EDUCATION/TRAINING PROGRAM

## 2025-04-07 PROCEDURE — 999N000185 HC STATISTIC TRANSPORT TIME EA 15 MIN

## 2025-04-07 PROCEDURE — 83735 ASSAY OF MAGNESIUM: CPT | Performed by: STUDENT IN AN ORGANIZED HEALTH CARE EDUCATION/TRAINING PROGRAM

## 2025-04-07 PROCEDURE — 97110 THERAPEUTIC EXERCISES: CPT | Mod: GO

## 2025-04-07 PROCEDURE — 250N000013 HC RX MED GY IP 250 OP 250 PS 637: Performed by: INTERNAL MEDICINE

## 2025-04-07 PROCEDURE — 97112 NEUROMUSCULAR REEDUCATION: CPT | Mod: GP

## 2025-04-07 PROCEDURE — 94640 AIRWAY INHALATION TREATMENT: CPT

## 2025-04-07 PROCEDURE — 84100 ASSAY OF PHOSPHORUS: CPT | Performed by: STUDENT IN AN ORGANIZED HEALTH CARE EDUCATION/TRAINING PROGRAM

## 2025-04-07 PROCEDURE — 250N000013 HC RX MED GY IP 250 OP 250 PS 637: Performed by: PHYSICIAN ASSISTANT

## 2025-04-07 PROCEDURE — 99232 SBSQ HOSP IP/OBS MODERATE 35: CPT | Performed by: INTERNAL MEDICINE

## 2025-04-07 PROCEDURE — 94640 AIRWAY INHALATION TREATMENT: CPT | Mod: 76

## 2025-04-07 PROCEDURE — 250N000009 HC RX 250: Performed by: STUDENT IN AN ORGANIZED HEALTH CARE EDUCATION/TRAINING PROGRAM

## 2025-04-07 PROCEDURE — 99231 SBSQ HOSP IP/OBS SF/LOW 25: CPT | Mod: 24

## 2025-04-07 PROCEDURE — 99232 SBSQ HOSP IP/OBS MODERATE 35: CPT | Performed by: STUDENT IN AN ORGANIZED HEALTH CARE EDUCATION/TRAINING PROGRAM

## 2025-04-07 PROCEDURE — 97530 THERAPEUTIC ACTIVITIES: CPT | Mod: GP

## 2025-04-07 PROCEDURE — 94660 CPAP INITIATION&MGMT: CPT

## 2025-04-07 PROCEDURE — 97535 SELF CARE MNGMENT TRAINING: CPT | Mod: GO

## 2025-04-07 PROCEDURE — 250N000013 HC RX MED GY IP 250 OP 250 PS 637: Performed by: STUDENT IN AN ORGANIZED HEALTH CARE EDUCATION/TRAINING PROGRAM

## 2025-04-07 PROCEDURE — 999N000215 HC STATISTIC HFNC ADULT NON-CPAP

## 2025-04-07 PROCEDURE — 250N000011 HC RX IP 250 OP 636: Performed by: INTERNAL MEDICINE

## 2025-04-07 PROCEDURE — 94799 UNLISTED PULMONARY SVC/PX: CPT

## 2025-04-07 PROCEDURE — G0463 HOSPITAL OUTPT CLINIC VISIT: HCPCS

## 2025-04-07 PROCEDURE — 83735 ASSAY OF MAGNESIUM: CPT | Performed by: PHYSICIAN ASSISTANT

## 2025-04-07 PROCEDURE — 999N000157 HC STATISTIC RCP TIME EA 10 MIN

## 2025-04-07 PROCEDURE — 80048 BASIC METABOLIC PNL TOTAL CA: CPT | Performed by: PHYSICIAN ASSISTANT

## 2025-04-07 PROCEDURE — 210N000001 HC R&B IMCU HEART CARE

## 2025-04-07 PROCEDURE — 74177 CT ABD & PELVIS W/CONTRAST: CPT

## 2025-04-07 PROCEDURE — 85041 AUTOMATED RBC COUNT: CPT | Performed by: PHYSICIAN ASSISTANT

## 2025-04-07 RX ORDER — MAGNESIUM SULFATE 4 G/50ML
4 INJECTION INTRAVENOUS ONCE
Status: COMPLETED | OUTPATIENT
Start: 2025-04-07 | End: 2025-04-07

## 2025-04-07 RX ORDER — IOPAMIDOL 755 MG/ML
75 INJECTION, SOLUTION INTRAVASCULAR ONCE
Status: COMPLETED | OUTPATIENT
Start: 2025-04-07 | End: 2025-04-07

## 2025-04-07 RX ADMIN — ACETAMINOPHEN 975 MG: 325 TABLET ORAL at 21:20

## 2025-04-07 RX ADMIN — ALBUTEROL SULFATE 2.5 MG: 2.5 SOLUTION RESPIRATORY (INHALATION) at 21:48

## 2025-04-07 RX ADMIN — CHLORHEXIDINE GLUCONATE 15 ML: 1.2 SOLUTION ORAL at 21:20

## 2025-04-07 RX ADMIN — ALBUTEROL SULFATE 2.5 MG: 2.5 SOLUTION RESPIRATORY (INHALATION) at 17:01

## 2025-04-07 RX ADMIN — APIXABAN 10 MG: 5 TABLET, FILM COATED ORAL at 09:20

## 2025-04-07 RX ADMIN — MAGNESIUM SULFATE HEPTAHYDRATE 4 G: 80 INJECTION, SOLUTION INTRAVENOUS at 09:11

## 2025-04-07 RX ADMIN — PIPERACILLIN AND TAZOBACTAM 3.38 G: 3; .375 INJECTION, POWDER, FOR SOLUTION INTRAVENOUS at 05:43

## 2025-04-07 RX ADMIN — ALBUTEROL SULFATE 2.5 MG: 2.5 SOLUTION RESPIRATORY (INHALATION) at 13:15

## 2025-04-07 RX ADMIN — IOPAMIDOL 75 ML: 755 INJECTION, SOLUTION INTRAVENOUS at 20:06

## 2025-04-07 RX ADMIN — ACETAMINOPHEN 975 MG: 325 TABLET ORAL at 13:15

## 2025-04-07 RX ADMIN — ALBUTEROL SULFATE 2.5 MG: 2.5 SOLUTION RESPIRATORY (INHALATION) at 08:23

## 2025-04-07 RX ADMIN — PIPERACILLIN AND TAZOBACTAM 3.38 G: 3; .375 INJECTION, POWDER, FOR SOLUTION INTRAVENOUS at 21:19

## 2025-04-07 RX ADMIN — SODIUM CHLORIDE SOLN NEBU 3% 3 ML: 3 NEBU SOLN at 17:01

## 2025-04-07 RX ADMIN — APIXABAN 10 MG: 5 TABLET, FILM COATED ORAL at 21:20

## 2025-04-07 RX ADMIN — ALBUTEROL SULFATE 2.5 MG: 2.5 SOLUTION RESPIRATORY (INHALATION) at 04:02

## 2025-04-07 RX ADMIN — SODIUM CHLORIDE SOLN NEBU 3% 3 ML: 3 NEBU SOLN at 00:37

## 2025-04-07 RX ADMIN — SODIUM CHLORIDE SOLN NEBU 3% 3 ML: 3 NEBU SOLN at 08:23

## 2025-04-07 RX ADMIN — AMLODIPINE BESYLATE 5 MG: 5 TABLET ORAL at 09:19

## 2025-04-07 RX ADMIN — ALBUTEROL SULFATE 2.5 MG: 2.5 SOLUTION RESPIRATORY (INHALATION) at 00:37

## 2025-04-07 RX ADMIN — PIPERACILLIN AND TAZOBACTAM 3.38 G: 3; .375 INJECTION, POWDER, FOR SOLUTION INTRAVENOUS at 12:50

## 2025-04-07 ASSESSMENT — ACTIVITIES OF DAILY LIVING (ADL)
ADLS_ACUITY_SCORE: 57
ADLS_ACUITY_SCORE: 61
ADLS_ACUITY_SCORE: 57
ADLS_ACUITY_SCORE: 57
ADLS_ACUITY_SCORE: 61
ADLS_ACUITY_SCORE: 61
ADLS_ACUITY_SCORE: 57
ADLS_ACUITY_SCORE: 61
ADLS_ACUITY_SCORE: 61
ADLS_ACUITY_SCORE: 57
ADLS_ACUITY_SCORE: 61
ADLS_ACUITY_SCORE: 61
ADLS_ACUITY_SCORE: 57
ADLS_ACUITY_SCORE: 61
ADLS_ACUITY_SCORE: 57

## 2025-04-07 NOTE — CONSULTS
[facility]  WO Nurse Inpatient Assessment     Consulted for: right groin region potential for wound vac; original consult - butocks    Summary: Saw with surgery PA bedside for assessment of right groin wound. Due to current status of wound, surgery is wanting to wait on VAC application. Noted when charting that pt is scheduled for further surgical debridement 4/8/25.    From initial consult: RN reported patient can be difficult to turn    WO nurse follow-up plan: weekly    Patient History (according to provider note(s):      Marly Cannon is a 59 year old woman with history of intellectual disability & memory issues, COPD*on supplemental oxygen, untreated ADEEL, tobacco use d/o, HTN, pAFib, & poorly controlled T2DM w/ recent hospitalization for DKA (3/18 - 3/20), who presented to the hospital in [facility] on 3/22/2025 with complaints of dyspnea, hyperglycemia, and chest pain. She was found to have an YADIRA, LLL PE, DKA, and A-fib with RVR. She was started on an insulin drip, therapeutic dose enoxaparin, Zosyn for possible pneumonia, and diltiazem drip for management of her RVR. Her DKA has resolved, she was switched from diltiazem to amiodarone drip for management of A-fib with RVR; however, her degree of respiratory failure was still concerning and she was intubated 3/23 for management of worsening hypoxia. She had a repeat CTA that demonstrated progression of her PE and RLL collapse concerning for mucous plugging and pneumonia. She was switched from enoxaparin to heparin drip and her antibiotics were broadened to vancomycin and Zosyn. She was on norepinephrine for several days d/t hypotension attributed to sedation & sepsis-related vasoplegia. It appears that she has been doing fairly long SBTs since 3/26, which seemed to be going reasonably well per RT documentation. Primary team has been having issues w/ progressively increasing FiO2 needs. She had a sputum culture grow Candida & has been  on fluconazole since 3/25. She has remained on the insulin drip, presumably to manage the persistent hyperglycemia during her steroid burst (for the presumed COPD exacerbation). She has received a fairly large volume of documented fluids, & was started on diuresis 3/30. She was transferred to [facility] d/t concerns that she will require a tracheostomy secondary to her inability to liberate from the vent.     4/2/25  Preoperative Diagnosis: Perineal and right groin infection     Postoperative Diagnosis: Perineal and right groin necrotizing soft tissue infection     Procedure: Incision and drainage of perineum and right groin     Findings: Infection traveling up the right labia into the right groin.  Final debridement included a right groin defect measuring 18 x 8 x 5 cm and a perineal wound measuring 6 x 2 x 1 cm.       Assessment:      Wound location: Right groin    4/7/25 4/7/25    Last photo: 4/7/25  Wound due to: Surgical Wound  Wound history/plan of care: I&D 4/2/25  Wound base: see photo - mix of adipose, clean red, and necrotic gray     Palpation of the wound bed: normal      Drainage:  heavy     Description of drainage: serosanguinous and tan     Measurements (length x width x depth, in cm): groin wound measuring 5cm x 19cm x 3cm     Tunneling: up to 7cm from 10 o'clock; also tunnels as visible in above photo to labia wound (not measured today)     Undermining: from 10-2 o'clock; up to 7.5cm at 2 o'clock  Periwound skin: Intact and some remaining comedones      Color: normal and consistent with surrounding tissue      Temperature: normal   Odor: none  Pain: facial expression of distress intermittently during cares; pt declined pain medication multiple times during cares  Pain interventions prior to dressing change: slow and gentle cares  and distraction  Treatment goal: Drainage control and Infection control/prevention  STATUS: initial assessment  Supplies ordered: supplies stored on unit,  discussed with RN, and discussed with patient        Pressure Injury Location: Bilateral buttocks - not assessed 4/7/25 4/1    Last photo: 4/1  Wound type: Pressure Injury and Friction     Pressure Injury Stage: 2, present on admission   Wound history/plan of care:   there is a darker area to the gluteal cleft with some maceration. Unclear if it is a developing DTPI but will monitor for changes    Wound base: 100 % Epidermis     Palpation of the wound bed: normal      Drainage: scant     Description of drainage: serosanguinous     Measurements (length x width x depth, in cm) L 3 x 3 x 0.1cm ; R 2  x 2.4  x  0.1 cm      Tunneling N/A     Undermining N/A  Periwound skin: Intact      Color: normal and consistent with surrounding tissue      Temperature: normal   Odor: none  Pain: mild, tender  Pain intervention prior to dressing change: slow and gentle cares   Treatment goal: Infection control/prevention, Protection, and Promote epidermal migration  STATUS: initial assessment  Supplies ordered: supplies stored on unit    My PI Risk Assessment     Sensory Perception: 3 - Slightly Limited     Moisture: 2 - Very moist      Activity: 1 - Bedfast      Mobility: 2 - Very limited     Nutrition: 2 - Probably inadequate      Friction/Shear: 2 - Potential problem      TOTAL: 12     Treatment Plan:   See orders from surgery for R groin wound.    Bilateral buttocks wounds: Every 3 days   Cleanse the area with wound cleanser and pat dry.  Apply No sting film barrier to periwound skin.  Cover wound with Sacral Mepilex (#[id number])  Change dressing Q 3 days.  Turn and reposition Q 2hrs side to side only.  Ensure pt has Pablo-cushion while sitting up in the chair.  If not in ICU: Ensure air pump on bed and set to Isoflex.  FYI- If pt has constant incontinent loose stools needing dressing changes Q shift please discontinue the Mepilex dressing and apply criticaid barrier paste BID and PRN.    Orders: Reviewed    RECOMMEND PRIMARY TEAM  ORDER: None, at this time  Education provided: plan of care, wound progress, and Moisture management  Discussed plan of care with: Patient, Nurse, and Physicians Assistant  Notify [facility] if wound(s) deteriorate.  Nursing to notify the Provider(s) and re-consult the [facility] Nurse if new skin concern.    DATA:     Current support surface: Standard  Low air loss (SIMIN pump, Isolibrium, Pulsate)  Containment of urine/stool: Incontinence Protocol and Indwelling catheter  BMI: Body mass index is 40.39 kg/m .   Active diet order: Orders Placed This Encounter      Combination Diet Soft and Bite Sized Diet (level 6); Thin Liquids (level 0)      NPO for Medical/Clinical Reasons Except for: Meds     Output: I/O last 3 completed shifts:  In: 960 [P.O.:960]  Out: 5075 [Urine:5075]     Labs:   Recent Labs   Lab 04/07/25  0542 04/01/25  0418 03/31/25  2206   ALBUMIN  --   --  1.8*   HGB 10.7*   < > 12.0   INR  --   --  1.24*   WBC 6.9   < > 10.7    < > = values in this interval not displayed.     Pressure injury risk assessment:   Sensory Perception: 2-->very limited  Moisture: 3-->occasionally moist  Activity: 1-->bedfast  Mobility: 2-->very limited  Nutrition: 2-->probably inadequate  Friction and Shear: 1-->problem  Vicente Score: 11    Radha Heart RN CWOCN  Pager no longer in use, please contact through [employer] group: [facility] [employer]

## 2025-04-07 NOTE — PROGRESS NOTES
[facility]    Medicine Progress Note - Hospitalist Service    Date of Admission:  3/31/2025    Assessment & Plan   59-year-old female patient who was transferred from [facility] on 3/31 for difficulty of weaning off mechanical ventilation.  She is also receiving treatment for perianal abscess. Past medical history significant for hypertension,ckd3,  type 2 diabetes mellitus, intellectual disability, hyperparathyroidism, subsegmental PE, COPD on home O2, untreated ADEEL.     Acute hypoxic respiratory failure due to Ventilator associated pneumonia  -Underlying severe COPD  -Was extubated on 4/1 after prolonged mechanical ventilation  - Patient is now placed back on high flow nasal cannula at 40 L, 40% FiO2  -Continue Zosyn  -Taper oxygen as able.   -Continue with respiratory hygiene and nebulization therapy   - BiPAP at night. She usually refuses  - PT/OT, planning to discharge to LTACH    Segmental and subsegmental PE  - was On heparin gtt,  switched to Eliquis 5 mg oral twice daily    Perianal abscess  Condyloma acuminata  - completed acyclovir in mid March  - s/p I&D with Penrose drain placement on 3/31.  - Continue Zosyn  - ID on board  - WOC   - Abdomen pelvis CT requested today    Type 2 diabetes mellitus, poorly controlled  S/p DKA treatment recently  - Hemoglobin A1c is 12.7  - Lantus 15 units subcutaneously  - high intensity insulin sliding scale  - Accu-Checks  - Hypoglycemia protocol    YADIRA on CKD stage III  - Baseline creatinine is around 1.1-1.3,-->1.56-->1.24  - Monitor    Anemia  - Most likely related to chronic medical illness  - Monitor          Diet: Snacks/Supplements Adult: Magic Cup; With Meals  Combination Diet Soft and Bite Sized Diet (level 6); Thin Liquids (level 0)  NPO for Medical/Clinical Reasons Except for: Meds    DVT Prophylaxis: DOAC  Landrum Catheter: PRESENT, indication: Wound deterioration and failed external collection device  Lines: PRESENT      CVC  "Triple Lumen Right Internal jugular-Site Assessment: WDL      Cardiac Monitoring: None  Code Status: Full Code      Clinically Significant Risk Factors             # Hypomagnesemia: Lowest Mg = 1.5 mg/dL in last 2 days, will replace as needed   # Hypoalbuminemia: Lowest albumin = 1.7 g/dL at 3/31/2025  5:35 PM, will monitor as appropriate     # Hypertension: Noted on problem list     # Acute Hypoxic Respiratory Failure: Documented O2 saturation < 90%. Continue supplemental oxygen as needed        # DMII: A1C = 12.7 % (Ref range: <5.7 %) within past 6 months   # Severe Obesity: Estimated body mass index is 40.39 kg/m  as calculated from the following:    Height as of 3/22/25: 1.651 m (5' 5\").    Weight as of this encounter: 110.1 kg (242 lb 11.6 oz).      # COPD: noted on problem list        Social Drivers of Health    Tobacco Use: Medium Risk (4/2/2025)    Patient History     Smoking Tobacco Use: Former     Smokeless Tobacco Use: Never     Passive Exposure: Current   Physical Activity: Unknown (5/16/2024)    Exercise Vital Sign     Days of Exercise per Week: 0 days   Social Connections: Unknown (5/16/2024)    Social Connection and Isolation Panel [NHANES]     Frequency of Social Gatherings with Friends and Family: More than three times a week          Disposition Plan     Medically Ready for Discharge: Anticipated in 2-4 Days             Noam Schneider MD  Hospitalist Service  [facility]  Securely message with [employer] (more info)  Text page via [employer] Paging/Directory   ______________________________________________________________________    Interval History   Acutely sick looking.  No new complaints.  Plan for possible wound VAC placement today.  Will also get CT abdomen pelvis.  Management plan discussed with the patient and she expressed understanding.    Physical Exam   Vital Signs: Temp: 98.4  F (36.9  C) Temp src: Oral BP: (!) 150/74 Pulse: 82   Resp: 22 SpO2: 94 % O2 Device: " High Flow Nasal Cannula (HFNC) Oxygen Delivery: 40 LPM  Weight: 242 lbs 11.62 oz    General Appearance:  No distress noted  Respiratory: Good air entry bilaterally  Cardiovascular: S1 and S2 heard, no murmur or gallop  GI: Soft abdomen, right lower abdominal tenderness, normoactive bowel sounds  Skin: Intact and warm    Medical Decision Making       40 MINUTES SPENT BY ME on the date of service doing chart review, history, exam, documentation & further activities per the note.      Data

## 2025-04-07 NOTE — PROGRESS NOTES
Care Management Follow Up    Length of Stay (days): 7    Expected Discharge Date: 04/09/2025    Anticipated Discharge Plan:  LTACH    Transportation: Anticipate medical transport    PT Recommendations: LTACH, pending meets medical criteria  OT Recommendations:  LTACH-pending meets medical criteria     Barriers to Discharge: medical stability    Prior Living Situation: apartment with significant other    Discussed  Partnership in Safe Discharge Planning  document with patient/family: No     Handoff Completed: No, handoff not indicated or clinically appropriate    Patient/Spokesperson Updated: No    Additional Information:  Chart reviewed. Discussed with Sera from [facility], patient is on her list to be reviewed. Will keep CM updated     10:04 AM  Per Sera at [facility], patient currently looks to be appropriate     12:54 PM  Received a called from [facility]. Says she will be out of the office the rest of the week, but patients home care nurse Kristina would be a good contact if needed [phone]  Next Steps: continue to follow     Olga Bui RN

## 2025-04-07 NOTE — PROGRESS NOTES
Patient decline to wear BIPAP overnight, tolerating HFNC 45 lpm, 50% FiO2, received bens as schedule.

## 2025-04-07 NOTE — PLAN OF CARE
Problem: Fall Injury Risk  Goal: Absence of Fall and Fall-Related Injury  Outcome: Progressing  Intervention: Promote Injury-Free Environment  Recent Flowsheet Documentation  Taken 4/6/2025 2000 by Basilia Junior RN  Safety Promotion/Fall Prevention:   increase visualization of patient   patient and family education     Problem: Pain Acute  Goal: Optimal Pain Control and Function  Outcome: Progressing     Problem: Infection  Goal: Absence of Infection Signs and Symptoms  Outcome: Progressing   Goal Outcome Evaluation:  Assumed care of pt at 1900, pt is alert & oriented, quiet, disoriented to situation. Right groin wound dressing done tonight, wound packed w/wet gauze. C/D/I. Pt repositioned Q2H.  Call light within reach.

## 2025-04-07 NOTE — PLAN OF CARE
"  Problem: Adult Inpatient Plan of Care  Goal: Plan of Care Review  Description: The Plan of Care Review/Shift note should be completed every shift.  The Outcome Evaluation is a brief statement about your assessment that the patient is improving, declining, or no change.  This information will be displayed automatically on your shiftnote.  Outcome: Progressing  Goal: Patient-Specific Goal (Individualized)  Description: You can add care plan individualizations to a care plan. Examples of Individualization might be:  \"Parent requests to be called daily at 9am for status\", \"I have a hard time hearing out of my right ear\", or \"Do not touch me to wake me up as it startlesme\".  Outcome: Progressing  Goal: Absence of Hospital-Acquired Illness or Injury  Intervention: Identify and Manage Fall Risk  Recent Flowsheet Documentation  Taken 4/7/2025 1613 by Masha Reyes RN  Safety Promotion/Fall Prevention:   increase visualization of patient   patient and family education  Taken 4/7/2025 1151 by Masha Reyes RN  Safety Promotion/Fall Prevention:   increase visualization of patient   patient and family education  Taken 4/7/2025 0750 by Masha Reyes RN  Safety Promotion/Fall Prevention:   increase visualization of patient   patient and family education  Intervention: Prevent Skin Injury  Recent Flowsheet Documentation  Taken 4/7/2025 1613 by Masha Reyes RN  Body Position:   turned   right  Taken 4/7/2025 1500 by Masha Reyes RN  Body Position:   turned   right  Taken 4/7/2025 1151 by Masha Reyes RN  Body Position:   turned   right  Taken 4/7/2025 1000 by Masha Reyes RN  Body Position:   turned   right  Taken 4/7/2025 0750 by Masha Reyes RN  Body Position:   turned   right  Intervention: Prevent and Manage VTE (Venous Thromboembolism) Risk  Recent Flowsheet Documentation  Taken 4/7/2025 1613 by Masha Reyes RN  VTE Prevention/Management: SCDs on (sequential compression " devices)  Taken 4/7/2025 1151 by Masha Reyes RN  VTE Prevention/Management: SCDs on (sequential compression devices)  Taken 4/7/2025 0750 by Masha Reyes RN  VTE Prevention/Management: SCDs on (sequential compression devices)  Goal: Optimal Comfort and Wellbeing  Intervention: Provide Person-Centered Care  Recent Flowsheet Documentation  Taken 4/7/2025 1613 by Masha Reyes RN  Trust Relationship/Rapport:   care explained   choices provided  Taken 4/7/2025 1151 by Masha Reyes RN  Trust Relationship/Rapport:   care explained   choices provided  Taken 4/7/2025 0750 by Masha Reyes RN  Trust Relationship/Rapport:   care explained   choices provided   Goal Outcome Evaluation:       Still requiring Hi-Flow Oxygen at 45% O2  O2 Saturation stable  Rt groin incision with serosanguinous drainage seen by Surgery and The Dressing done By WOMARCELO   RN  and   and will be done BID Denied any pain Worked with PT OT and sat  at the side of bed Coccyx pressure wound Dry and mepilex replaced Turned to her sides and patient is able to help during turns ate with good appetite Family were updated NPO PMN for further incision and drainage The current medical regimen is effective;  continue present plan and medications. To have small liquid stool changed kept clean and dry .

## 2025-04-07 NOTE — PLAN OF CARE
Problem: Adult Inpatient Plan of Care  Goal: Optimal Comfort and Wellbeing  Outcome: Progressing     Problem: Risk for Delirium  Goal: Optimal Coping  Outcome: Progressing  Goal: Improved Attention and Thought Clarity  Outcome: Progressing  Goal: Improved Sleep  Outcome: Progressing     Problem: Skin Injury Risk Increased  Goal: Skin Health and Integrity  Outcome: Progressing  Intervention: Plan: Nurse Driven Intervention: Moisture Management  Recent Flowsheet Documentation  Taken 4/7/2025 0013 by Mini Beth RN  Moisture Interventions:   Incontinence pad   Urinary collection device  Intervention: Plan: Nurse Driven Intervention: Friction and Shear  Recent Flowsheet Documentation  Taken 4/7/2025 0013 by Mini Beth RN  Friction/Shear Interventions: HOB 30 degrees or less     Problem: Fall Injury Risk  Goal: Absence of Fall and Fall-Related Injury  Outcome: Progressing  Intervention: Identify and Manage Contributors  Recent Flowsheet Documentation  Taken 4/7/2025 0013 by Mini Beth RN  Medication Review/Management: medications reviewed  Intervention: Promote Injury-Free Environment  Recent Flowsheet Documentation  Taken 4/7/2025 0013 by Mini Beth RN  Safety Promotion/Fall Prevention:   safety round/check completed   nonskid shoes/slippers when out of bed   lighting adjusted   clutter free environment maintained   assistive device/personal items within reach     Problem: Pain Acute  Goal: Optimal Pain Control and Function  Outcome: Progressing  Intervention: Prevent or Manage Pain  Recent Flowsheet Documentation  Taken 4/7/2025 0013 by Mini Beth RN  Medication Review/Management: medications reviewed   Goal Outcome Evaluation:         Vitals:    04/07/25 0038 04/07/25 0041 04/07/25 0402 04/07/25 0404   BP:       BP Location:       Pulse: 75 74 76 74   Resp:       Temp:       TempSrc:       SpO2: 99% 99% 99% 100%   Weight:              Patient denied pain and was able to sleep for  most of the night. Calls for help appropriately including calling for turning in bed. Getting IV Zosyn. She didn't want tylenol as she denied pain. Patient denies SOB but was wheezy and RUBY. Continues with HFNC oxygen.

## 2025-04-07 NOTE — PROGRESS NOTES
General Surgery Progress Note:    Hospital Day # 7    ASSESSMENT:  1. Pressure injury of buttock, stage 2, unspecified laterality (H) [L89.302]    2. Perineal abscess        Marly Cannon is a 59 year old female PMH COPD on home O2, DM2 12.7% 3/22, recently admitted for DKA and acute hypoxic respiratory failure with LL lobe PE, complicated by VAP, DKA, chronic decubitus ulcer s/p bedside I&D and Penrose placement 3/31 s/p incision and drainage of NSTI 4/2 with Penrose placements.   Requiring 40 LPM currently, though no leukocytosis and vitally stable. Wound appears more mucky today especially in the right labia area near the penrose; flushed and packed more aggressively this morning, will see tomorrow morning and if remains mucky will likely washout in OR. If clean enough for wound vac potential vac tomorrow.    Per patient she has help with making medical decisions from her sister (number in chart). Called x2 no answer    PLAN:  -Diet as tolerated today, NPO at midnight for re-evaluation of wound tomorrow potential OR vs. Wound vac  -Appreciate WOC input  -Continue with dressing changes PRN over top and BID vashe moistened gauze packing; please be sure to pack the upper right groin wound and the lower right labia area  -Medical management per primary team    SUBJECTIVE:   Marly Cannon was seen on rounds. Patient does not say much but does state she is doing well, denies need for premedication for this wound changing. Pain is tolerable at rest. Denies new symptoms.     VITALS RANGE:  Temp:  [98.2  F (36.8  C)-98.4  F (36.9  C)] 98.4  F (36.9  C)  Pulse:  [73-82] 82  Resp:  [20-22] 22  BP: (126-155)/(57-74) 150/74  FiO2 (%):  [40 %-55 %] 40 %  SpO2:  [97 %-100 %] 97 %    PHYSICAL EXAM:  General: patient seen resting in bed in no acute distress  Resp: no increased work of breathing, breathing comfortably on room air  Abdomen: generally soft nontender  Right groin: pictures per WOC updated today. Right groin area  and lateral tunnel appear generally clean but with fibrinous tan tissue. Near the penrose has more mucky area and the tunnel appears to have increased in size from my last visit. Recommend waiting on wound vac today. Flushed with 1 normal saline, 2 x vashe through this area and still mucky; packed a bit more today with vashe kerlix today. Packed beneath the penrose as well in that area which is more clean wound base but also near rectum and stool soilment.    Extremities: wearing pneumoboots    04/06 0700 - 04/07 0659  In: 960 [P.O.:960]  Out: 5075 [Urine:5075]    No results displayed because visit has over 200 results.           ANNETTE Dudley [facility] General Surgery  [address] [place] [id number]  [facility] [phone]

## 2025-04-07 NOTE — PROGRESS NOTES
INFECTIOUS DISEASE FOLLOW UP NOTE  Date: 04/07/2025     ================================================================  SUBJECTIVE  No events    ================================================================  OBJECTIVE  BP (!) 150/74 (BP Location: Left arm)   Pulse 82   Temp 98.4  F (36.9  C) (Oral)   Resp 22   Wt 110.1 kg (242 lb 11.6 oz)   SpO2 97%   BMI 40.39 kg/m      Physical Exam  General: lethargic, no apparent distress  HEENT: atraumatic, normocephalic, no scleral icterus, moist mucus membranes, no cervical lymphadenopathy  Heart: Normal rate, regular rhythm  Lungs: good inspiratory effort  Abdomen: soft, non-tender, non-distended  Extremities: no peripheral edema. Buttock ulcer per below      Pertinent labs  CBC RESULTS:   Recent Labs   Lab Test 04/03/25  0417   WBC 11.3*   RBC 3.59*   HGB 11.0*   HCT 33.5*   MCV 93   MCH 30.6   MCHC 32.8   RDW 14.4           Imaging  3/22 CTA chest  1.  Very small pulmonary artery emboli to subsegmental branches of the left lower lobe. No right heart strain.  2.  Mild emphysematous changes of the lungs. No focal pulmonary consolidation or pleural effusion.  3.  Trace pericardial effusion.  4.  Small sliding-type hiatal hernia.     3/24 CTA chest  Exam positive for pulmonary embolism. There has been progression of emboli as seen previously. There is now involvement a segmental and subsegmental arteries of the right lower lobe. There is  now atelectasis of the entire right lower lobe with volume loss. There is some abnormal enhancement within portions of the right lower lobe which may be sequela of infarction/ischemia. There is opacification of bronchi within the right lower lobe. Mucous plugging may be present.12     3/24 CT A/P  There is gallbladder distention. A small amount of gallbladder sludge is possible but no calcified gallstones are seen and there is no biliary dilatation. There is diffuse subcutaneous edema. Small right inguinal hernia containing  fat. No loops of bowel are involved.     3/31 CT pelvis  Right inguinal hernia containing fat. Worsening subcutaneous edema and edema in the perineum and labia on the right as compared to previous examination. No discrete abscess is seen.     3/31 CT chest  1.  Complete right and near complete left lower lobe collapse.  2.  Mild groundglass density in the right middle lobe, nonspecific.        Microbiology data  3/17 flu/covid/rsv: negative  3/17 blood: NG  3/22 blood: NG  3/24 MRSA nares: negative  3/26 sputum culture: candida, GPCs  3/31 serum galactomannan: negative  3/31 beta-D glucan: normal  3/31 BAL              49 PMNs, 11% PMNs, 30% monocytes, 59% lining cells              Culture: NGTD              AFB culture: NGTD              Histoplasma Ag: negative              Nocardia culture: NGTD              Legionella: negative  4/1 blood: NGTD     I have personally reviewed the relevant laboratory, imaging, and microbiology data  ================================================================  Assessment  Marly Cannon is a 59 year old with perianal abscess.  PMH intellectual disability, COPD w/ severe obstruction, active tobacco use, obesity, ADEEL, secondary polycythemia, DM2, CKD3, HTN.     Recently admitted 3/18-3/20 for DKA + respiratory failure + condyloma acuminata.  Was discharged with insulin pump, 2L oxygen, acyclovir.  Presented again on 3/22 with chest pain, shortness of breath, found to have DKA, distributive shock, respiratory failure, PE.  She required intubation, and this hospital course was complicated by VAP and perianal abscess s/p I&D with penrose placement on 3/31.  Chest CT on 3/31 showed bilateral lobe collapse.  She has been on pip-tazo since 3/23.  Underwent BAL on 3/31, studies are pending.      For now will continue broad antibiotics to cover both GI/ dre as well as hospital-associated pulmonary pathogens.  Over the last 7 days some of her clinical parameters have improved  (downtrending WBC count, now extubated), but still significantly inflamed and with collapse of her lower lung lobes means duration of therapy will need to be extended.  In addition she continued to have feculant drainage from her perianal wound, so was taken back to the OR for I&D on 4/2.     Impression  # Perianal abscess              - s/p penrose drain placement 3/31              - s/p I&D 4/2  # Condyloma acuminata              - s/p acyclovir in mid-March  # Acute hypoxic respiratory failure  # RML pneumonia, ventilator associated  # Bilateral lower lobe collapse  # Segmental pulmonary embolus  # Severe COPD  # Poorly controlled DM2 (A1C 12)     ================================================================  Plan  - Continue pip-tazo  - CT A/P  - ID will follow    Jeremy Cannon MD, MPH  [facility]   Office Telephone [phone].  Fax [phone]  [facility] paging

## 2025-04-08 ENCOUNTER — APPOINTMENT (OUTPATIENT)
Dept: SPEECH THERAPY | Facility: HOSPITAL | Age: 60
DRG: 981 | End: 2025-04-08
Attending: STUDENT IN AN ORGANIZED HEALTH CARE EDUCATION/TRAINING PROGRAM
Payer: COMMERCIAL

## 2025-04-08 ENCOUNTER — ANESTHESIA EVENT (OUTPATIENT)
Dept: SURGERY | Facility: HOSPITAL | Age: 60
End: 2025-04-08
Payer: COMMERCIAL

## 2025-04-08 ENCOUNTER — ANESTHESIA (OUTPATIENT)
Dept: SURGERY | Facility: HOSPITAL | Age: 60
End: 2025-04-08
Payer: COMMERCIAL

## 2025-04-08 LAB
ANION GAP SERPL CALCULATED.3IONS-SCNC: 4 MMOL/L (ref 7–15)
BUN SERPL-MCNC: 13 MG/DL (ref 8–23)
CALCIUM SERPL-MCNC: 8.8 MG/DL (ref 8.8–10.4)
CHLORIDE SERPL-SCNC: 98 MMOL/L (ref 98–107)
CREAT SERPL-MCNC: 1.23 MG/DL (ref 0.51–0.95)
CRP SERPL-MCNC: 48.2 MG/L
EGFRCR SERPLBLD CKD-EPI 2021: 50 ML/MIN/1.73M2
ERYTHROCYTE [DISTWIDTH] IN BLOOD BY AUTOMATED COUNT: 13.8 % (ref 10–15)
GLUCOSE BLDC GLUCOMTR-MCNC: 157 MG/DL (ref 70–99)
GLUCOSE BLDC GLUCOMTR-MCNC: 177 MG/DL (ref 70–99)
GLUCOSE BLDC GLUCOMTR-MCNC: 177 MG/DL (ref 70–99)
GLUCOSE BLDC GLUCOMTR-MCNC: 188 MG/DL (ref 70–99)
GLUCOSE BLDC GLUCOMTR-MCNC: 208 MG/DL (ref 70–99)
GLUCOSE BLDC GLUCOMTR-MCNC: 284 MG/DL (ref 70–99)
GLUCOSE SERPL-MCNC: 145 MG/DL (ref 70–99)
HCO3 SERPL-SCNC: 37 MMOL/L (ref 22–29)
HCT VFR BLD AUTO: 34 % (ref 35–47)
HGB BLD-MCNC: 10.7 G/DL (ref 11.7–15.7)
MAGNESIUM SERPL-MCNC: 1.8 MG/DL (ref 1.7–2.3)
MCH RBC QN AUTO: 29.6 PG (ref 26.5–33)
MCHC RBC AUTO-ENTMCNC: 31.5 G/DL (ref 31.5–36.5)
MCV RBC AUTO: 94 FL (ref 78–100)
PHOSPHATE SERPL-MCNC: 3.6 MG/DL (ref 2.5–4.5)
PLATELET # BLD AUTO: 260 10E3/UL (ref 150–450)
POTASSIUM SERPL-SCNC: 4.4 MMOL/L (ref 3.4–5.3)
RBC # BLD AUTO: 3.61 10E6/UL (ref 3.8–5.2)
SODIUM SERPL-SCNC: 139 MMOL/L (ref 135–145)
WBC # BLD AUTO: 6.9 10E3/UL (ref 4–11)

## 2025-04-08 PROCEDURE — 250N000011 HC RX IP 250 OP 636

## 2025-04-08 PROCEDURE — 250N000009 HC RX 250: Performed by: SURGERY

## 2025-04-08 PROCEDURE — 250N000009 HC RX 250

## 2025-04-08 PROCEDURE — 258N000003 HC RX IP 258 OP 636

## 2025-04-08 PROCEDURE — 250N000009 HC RX 250: Performed by: STUDENT IN AN ORGANIZED HEALTH CARE EDUCATION/TRAINING PROGRAM

## 2025-04-08 PROCEDURE — 710N000009 HC RECOVERY PHASE 1, LEVEL 1, PER MIN: Performed by: SURGERY

## 2025-04-08 PROCEDURE — 999N000157 HC STATISTIC RCP TIME EA 10 MIN

## 2025-04-08 PROCEDURE — 250N000013 HC RX MED GY IP 250 OP 250 PS 637

## 2025-04-08 PROCEDURE — 99232 SBSQ HOSP IP/OBS MODERATE 35: CPT | Performed by: STUDENT IN AN ORGANIZED HEALTH CARE EDUCATION/TRAINING PROGRAM

## 2025-04-08 PROCEDURE — 999N000141 HC STATISTIC PRE-PROCEDURE NURSING ASSESSMENT: Performed by: SURGERY

## 2025-04-08 PROCEDURE — 999N000185 HC STATISTIC TRANSPORT TIME EA 15 MIN

## 2025-04-08 PROCEDURE — 84100 ASSAY OF PHOSPHORUS: CPT | Performed by: STUDENT IN AN ORGANIZED HEALTH CARE EDUCATION/TRAINING PROGRAM

## 2025-04-08 PROCEDURE — 999N000215 HC STATISTIC HFNC ADULT NON-CPAP

## 2025-04-08 PROCEDURE — 360N000075 HC SURGERY LEVEL 2, PER MIN: Performed by: SURGERY

## 2025-04-08 PROCEDURE — 258N000001 HC RX 258: Performed by: SURGERY

## 2025-04-08 PROCEDURE — 250N000025 HC SEVOFLURANE, PER MIN: Performed by: SURGERY

## 2025-04-08 PROCEDURE — 94640 AIRWAY INHALATION TREATMENT: CPT | Mod: 76

## 2025-04-08 PROCEDURE — 272N000054 HC CANNULA HIGH FLOW, ADULT

## 2025-04-08 PROCEDURE — 94660 CPAP INITIATION&MGMT: CPT

## 2025-04-08 PROCEDURE — 86140 C-REACTIVE PROTEIN: CPT | Performed by: INTERNAL MEDICINE

## 2025-04-08 PROCEDURE — 999N000197 HC STATISTIC WOC PT EDUCATION, 0-15 MIN

## 2025-04-08 PROCEDURE — 99232 SBSQ HOSP IP/OBS MODERATE 35: CPT | Performed by: INTERNAL MEDICINE

## 2025-04-08 PROCEDURE — 97598 DBRDMT OPN WND ADDL 20CM/<: CPT | Performed by: SURGERY

## 2025-04-08 PROCEDURE — 272N000001 HC OR GENERAL SUPPLY STERILE: Performed by: SURGERY

## 2025-04-08 PROCEDURE — 83735 ASSAY OF MAGNESIUM: CPT | Performed by: PHYSICIAN ASSISTANT

## 2025-04-08 PROCEDURE — 250N000013 HC RX MED GY IP 250 OP 250 PS 637: Performed by: STUDENT IN AN ORGANIZED HEALTH CARE EDUCATION/TRAINING PROGRAM

## 2025-04-08 PROCEDURE — 94799 UNLISTED PULMONARY SVC/PX: CPT

## 2025-04-08 PROCEDURE — 97597 DBRDMT OPN WND 1ST 20 CM/<: CPT | Performed by: SURGERY

## 2025-04-08 PROCEDURE — 272N000272 HC CONTINUOUS NEBULIZER MICRO PUMP

## 2025-04-08 PROCEDURE — 120N000004 HC R&B MS OVERFLOW

## 2025-04-08 PROCEDURE — 99231 SBSQ HOSP IP/OBS SF/LOW 25: CPT | Mod: 57

## 2025-04-08 PROCEDURE — 94640 AIRWAY INHALATION TREATMENT: CPT

## 2025-04-08 PROCEDURE — 999N000156 HC STATISTIC RCP CONSULT EA 30 MIN

## 2025-04-08 PROCEDURE — 85027 COMPLETE CBC AUTOMATED: CPT | Performed by: PHYSICIAN ASSISTANT

## 2025-04-08 PROCEDURE — 82565 ASSAY OF CREATININE: CPT | Performed by: PHYSICIAN ASSISTANT

## 2025-04-08 PROCEDURE — 250N000011 HC RX IP 250 OP 636: Performed by: INTERNAL MEDICINE

## 2025-04-08 PROCEDURE — 92526 ORAL FUNCTION THERAPY: CPT | Mod: GN

## 2025-04-08 PROCEDURE — 250N000011 HC RX IP 250 OP 636: Performed by: ANESTHESIOLOGY

## 2025-04-08 PROCEDURE — 0JD80ZZ EXTRACTION OF ABDOMEN SUBCUTANEOUS TISSUE AND FASCIA, OPEN APPROACH: ICD-10-PCS | Performed by: SURGERY

## 2025-04-08 PROCEDURE — 370N000017 HC ANESTHESIA TECHNICAL FEE, PER MIN: Performed by: SURGERY

## 2025-04-08 PROCEDURE — 0JDB0ZZ EXTRACTION OF PERINEUM SUBCUTANEOUS TISSUE AND FASCIA, OPEN APPROACH: ICD-10-PCS | Performed by: SURGERY

## 2025-04-08 RX ORDER — BISACODYL 10 MG
10 SUPPOSITORY, RECTAL RECTAL DAILY PRN
Status: DISCONTINUED | OUTPATIENT
Start: 2025-04-11 | End: 2025-04-11 | Stop reason: HOSPADM

## 2025-04-08 RX ORDER — ACETAMINOPHEN 325 MG/1
975 TABLET ORAL ONCE
Status: DISCONTINUED | OUTPATIENT
Start: 2025-04-08 | End: 2025-04-08 | Stop reason: HOSPADM

## 2025-04-08 RX ORDER — SODIUM CHLORIDE, SODIUM LACTATE, POTASSIUM CHLORIDE, CALCIUM CHLORIDE 600; 310; 30; 20 MG/100ML; MG/100ML; MG/100ML; MG/100ML
INJECTION, SOLUTION INTRAVENOUS CONTINUOUS
Status: CANCELLED | OUTPATIENT
Start: 2025-04-08

## 2025-04-08 RX ORDER — ONDANSETRON 2 MG/ML
INJECTION INTRAMUSCULAR; INTRAVENOUS PRN
Status: DISCONTINUED | OUTPATIENT
Start: 2025-04-08 | End: 2025-04-08

## 2025-04-08 RX ORDER — MAGNESIUM OXIDE 400 MG/1
400 TABLET ORAL EVERY 4 HOURS
Status: DISPENSED | OUTPATIENT
Start: 2025-04-08 | End: 2025-04-08

## 2025-04-08 RX ORDER — PROPOFOL 10 MG/ML
INJECTION, EMULSION INTRAVENOUS CONTINUOUS PRN
Status: DISCONTINUED | OUTPATIENT
Start: 2025-04-08 | End: 2025-04-08

## 2025-04-08 RX ORDER — AMOXICILLIN 250 MG
1 CAPSULE ORAL 2 TIMES DAILY
Status: DISCONTINUED | OUTPATIENT
Start: 2025-04-08 | End: 2025-04-11 | Stop reason: HOSPADM

## 2025-04-08 RX ORDER — LIDOCAINE 40 MG/G
CREAM TOPICAL
Status: CANCELLED | OUTPATIENT
Start: 2025-04-08

## 2025-04-08 RX ORDER — PROPOFOL 10 MG/ML
INJECTION, EMULSION INTRAVENOUS PRN
Status: DISCONTINUED | OUTPATIENT
Start: 2025-04-08 | End: 2025-04-08

## 2025-04-08 RX ORDER — HYDROMORPHONE HCL IN WATER/PF 6 MG/30 ML
0.2 PATIENT CONTROLLED ANALGESIA SYRINGE INTRAVENOUS EVERY 5 MIN PRN
Status: DISCONTINUED | OUTPATIENT
Start: 2025-04-08 | End: 2025-04-08 | Stop reason: HOSPADM

## 2025-04-08 RX ORDER — DEXAMETHASONE SODIUM PHOSPHATE 10 MG/ML
INJECTION, SOLUTION INTRAMUSCULAR; INTRAVENOUS PRN
Status: DISCONTINUED | OUTPATIENT
Start: 2025-04-08 | End: 2025-04-08

## 2025-04-08 RX ORDER — BUPIVACAINE HYDROCHLORIDE AND EPINEPHRINE 2.5; 5 MG/ML; UG/ML
INJECTION, SOLUTION EPIDURAL; INFILTRATION; INTRACAUDAL; PERINEURAL PRN
Status: DISCONTINUED | OUTPATIENT
Start: 2025-04-08 | End: 2025-04-08 | Stop reason: HOSPADM

## 2025-04-08 RX ORDER — FENTANYL CITRATE 50 UG/ML
INJECTION, SOLUTION INTRAMUSCULAR; INTRAVENOUS PRN
Status: DISCONTINUED | OUTPATIENT
Start: 2025-04-08 | End: 2025-04-08

## 2025-04-08 RX ORDER — ONDANSETRON 2 MG/ML
4 INJECTION INTRAMUSCULAR; INTRAVENOUS EVERY 30 MIN PRN
Status: DISCONTINUED | OUTPATIENT
Start: 2025-04-08 | End: 2025-04-08 | Stop reason: HOSPADM

## 2025-04-08 RX ORDER — FENTANYL CITRATE 50 UG/ML
50 INJECTION, SOLUTION INTRAMUSCULAR; INTRAVENOUS EVERY 5 MIN PRN
Status: DISCONTINUED | OUTPATIENT
Start: 2025-04-08 | End: 2025-04-08 | Stop reason: HOSPADM

## 2025-04-08 RX ORDER — SODIUM CHLORIDE 9 MG/ML
INJECTION, SOLUTION INTRAVENOUS CONTINUOUS PRN
Status: DISCONTINUED | OUTPATIENT
Start: 2025-04-08 | End: 2025-04-08

## 2025-04-08 RX ORDER — SODIUM CHLORIDE, SODIUM LACTATE, POTASSIUM CHLORIDE, CALCIUM CHLORIDE 600; 310; 30; 20 MG/100ML; MG/100ML; MG/100ML; MG/100ML
INJECTION, SOLUTION INTRAVENOUS CONTINUOUS
Status: DISCONTINUED | OUTPATIENT
Start: 2025-04-08 | End: 2025-04-08 | Stop reason: HOSPADM

## 2025-04-08 RX ORDER — ACETAMINOPHEN 325 MG/1
975 TABLET ORAL EVERY 8 HOURS
Status: DISCONTINUED | OUTPATIENT
Start: 2025-04-08 | End: 2025-04-08

## 2025-04-08 RX ORDER — NALOXONE HYDROCHLORIDE 0.4 MG/ML
0.1 INJECTION, SOLUTION INTRAMUSCULAR; INTRAVENOUS; SUBCUTANEOUS
Status: DISCONTINUED | OUTPATIENT
Start: 2025-04-08 | End: 2025-04-08 | Stop reason: HOSPADM

## 2025-04-08 RX ORDER — ONDANSETRON 4 MG/1
4 TABLET, ORALLY DISINTEGRATING ORAL EVERY 30 MIN PRN
Status: DISCONTINUED | OUTPATIENT
Start: 2025-04-08 | End: 2025-04-08 | Stop reason: HOSPADM

## 2025-04-08 RX ORDER — HYDROMORPHONE HCL IN WATER/PF 6 MG/30 ML
0.4 PATIENT CONTROLLED ANALGESIA SYRINGE INTRAVENOUS EVERY 5 MIN PRN
Status: DISCONTINUED | OUTPATIENT
Start: 2025-04-08 | End: 2025-04-08 | Stop reason: HOSPADM

## 2025-04-08 RX ORDER — LIDOCAINE 40 MG/G
CREAM TOPICAL
Status: DISCONTINUED | OUTPATIENT
Start: 2025-04-08 | End: 2025-04-11 | Stop reason: HOSPADM

## 2025-04-08 RX ORDER — LIDOCAINE HYDROCHLORIDE 10 MG/ML
INJECTION, SOLUTION INFILTRATION; PERINEURAL PRN
Status: DISCONTINUED | OUTPATIENT
Start: 2025-04-08 | End: 2025-04-08

## 2025-04-08 RX ORDER — DEXAMETHASONE SODIUM PHOSPHATE 4 MG/ML
4 INJECTION, SOLUTION INTRA-ARTICULAR; INTRALESIONAL; INTRAMUSCULAR; INTRAVENOUS; SOFT TISSUE
Status: DISCONTINUED | OUTPATIENT
Start: 2025-04-08 | End: 2025-04-08 | Stop reason: HOSPADM

## 2025-04-08 RX ORDER — POLYETHYLENE GLYCOL 3350 17 G/17G
17 POWDER, FOR SOLUTION ORAL DAILY
Status: DISCONTINUED | OUTPATIENT
Start: 2025-04-09 | End: 2025-04-11 | Stop reason: HOSPADM

## 2025-04-08 RX ORDER — FENTANYL CITRATE 50 UG/ML
25 INJECTION, SOLUTION INTRAMUSCULAR; INTRAVENOUS EVERY 5 MIN PRN
Status: DISCONTINUED | OUTPATIENT
Start: 2025-04-08 | End: 2025-04-08 | Stop reason: HOSPADM

## 2025-04-08 RX ADMIN — SODIUM CHLORIDE: 9 INJECTION, SOLUTION INTRAVENOUS at 10:33

## 2025-04-08 RX ADMIN — ALBUTEROL SULFATE 2.5 MG: 2.5 SOLUTION RESPIRATORY (INHALATION) at 19:12

## 2025-04-08 RX ADMIN — PROPOFOL 50 MCG/KG/MIN: 10 INJECTION, EMULSION INTRAVENOUS at 10:50

## 2025-04-08 RX ADMIN — SODIUM CHLORIDE SOLN NEBU 3% 3 ML: 3 NEBU SOLN at 00:50

## 2025-04-08 RX ADMIN — PROCHLORPERAZINE EDISYLATE 5 MG: 5 INJECTION INTRAMUSCULAR; INTRAVENOUS at 11:55

## 2025-04-08 RX ADMIN — APIXABAN 10 MG: 5 TABLET, FILM COATED ORAL at 21:57

## 2025-04-08 RX ADMIN — SODIUM CHLORIDE SOLN NEBU 3% 3 ML: 3 NEBU SOLN at 07:00

## 2025-04-08 RX ADMIN — PROPOFOL 130 MG: 10 INJECTION, EMULSION INTRAVENOUS at 10:39

## 2025-04-08 RX ADMIN — DEXAMETHASONE SODIUM PHOSPHATE 4 MG: 10 INJECTION, SOLUTION INTRAMUSCULAR; INTRAVENOUS at 10:48

## 2025-04-08 RX ADMIN — ALBUTEROL SULFATE 2.5 MG: 2.5 SOLUTION RESPIRATORY (INHALATION) at 07:00

## 2025-04-08 RX ADMIN — ALBUTEROL SULFATE 2.5 MG: 2.5 SOLUTION RESPIRATORY (INHALATION) at 15:20

## 2025-04-08 RX ADMIN — SODIUM CHLORIDE SOLN NEBU 3% 3 ML: 3 NEBU SOLN at 15:21

## 2025-04-08 RX ADMIN — FENTANYL CITRATE 50 MCG: 50 INJECTION, SOLUTION INTRAMUSCULAR; INTRAVENOUS at 10:58

## 2025-04-08 RX ADMIN — ALBUTEROL SULFATE 2.5 MG: 2.5 SOLUTION RESPIRATORY (INHALATION) at 03:39

## 2025-04-08 RX ADMIN — PIPERACILLIN AND TAZOBACTAM 3.38 G: 3; .375 INJECTION, POWDER, FOR SOLUTION INTRAVENOUS at 05:12

## 2025-04-08 RX ADMIN — FENTANYL CITRATE 50 MCG: 50 INJECTION, SOLUTION INTRAMUSCULAR; INTRAVENOUS at 10:38

## 2025-04-08 RX ADMIN — ONDANSETRON 4 MG: 2 INJECTION INTRAMUSCULAR; INTRAVENOUS at 10:58

## 2025-04-08 RX ADMIN — AMLODIPINE BESYLATE 5 MG: 5 TABLET ORAL at 08:45

## 2025-04-08 RX ADMIN — ONDANSETRON 4 MG: 2 INJECTION, SOLUTION INTRAMUSCULAR; INTRAVENOUS at 11:39

## 2025-04-08 RX ADMIN — ACETAMINOPHEN 975 MG: 325 TABLET ORAL at 21:57

## 2025-04-08 RX ADMIN — LIDOCAINE HYDROCHLORIDE 2 ML: 10 INJECTION, SOLUTION INFILTRATION; PERINEURAL at 10:38

## 2025-04-08 RX ADMIN — ALBUTEROL SULFATE 2.5 MG: 2.5 SOLUTION RESPIRATORY (INHALATION) at 00:50

## 2025-04-08 RX ADMIN — SENNOSIDES AND DOCUSATE SODIUM 1 TABLET: 50; 8.6 TABLET ORAL at 21:57

## 2025-04-08 RX ADMIN — PIPERACILLIN AND TAZOBACTAM 3.38 G: 3; .375 INJECTION, POWDER, FOR SOLUTION INTRAVENOUS at 21:57

## 2025-04-08 ASSESSMENT — ACTIVITIES OF DAILY LIVING (ADL)
ADLS_ACUITY_SCORE: 65
ADLS_ACUITY_SCORE: 57
ADLS_ACUITY_SCORE: 57
ADLS_ACUITY_SCORE: 65
ADLS_ACUITY_SCORE: 65
ADLS_ACUITY_SCORE: 57
ADLS_ACUITY_SCORE: 61
ADLS_ACUITY_SCORE: 61
ADLS_ACUITY_SCORE: 65
ADLS_ACUITY_SCORE: 61
ADLS_ACUITY_SCORE: 65
ADLS_ACUITY_SCORE: 65
ADLS_ACUITY_SCORE: 57
ADLS_ACUITY_SCORE: 65
ADLS_ACUITY_SCORE: 57
ADLS_ACUITY_SCORE: 57
ADLS_ACUITY_SCORE: 65
ADLS_ACUITY_SCORE: 57
ADLS_ACUITY_SCORE: 57
ADLS_ACUITY_SCORE: 65
ADLS_ACUITY_SCORE: 57

## 2025-04-08 NOTE — PLAN OF CARE
"Speech Language Therapy Discharge Summary    Reason for therapy discharge:    All goals and outcomes met, no further needs identified.    Progress towards therapy goal(s). See goals on Care Plan in [facility] electronic health record for goal details.  Goals met    Therapy recommendation(s):    No further therapy is recommended.Pt back from her procedure and eating a late lunch. [facility] Soft diet was ordered. I analyzed her self eating roast with mashed potatoes and gravy and cut up fruit with no oral dysphagia or overt sx's aspiration. I also analyzed her with regular freedom cracker. She was able to masticate and swallow with independent use of thin liquids to soften and clear oral stasis effectively. Pt is a continuous drinker with and without straws. Consistent coughing noted with contiuous straw drinking but not with cups. Will change her diet to \"Easy to Chew\" with thin liquids from cup only, no straws. Pt agrees with plan.     Goal Outcome Evaluation:                        "

## 2025-04-08 NOTE — ANESTHESIA POSTPROCEDURE EVALUATION
Patient: Marly Cannon    Procedure: Procedure(s):  IRRIGATION AND DEBRIDEMENT, WOUND, RIGHT GROIN AND LABIA       Anesthesia Type:  General    Note:  Disposition: Outpatient   Postop Pain Control: Uneventful            Sign Out: Well controlled pain   PONV: No   Neuro/Psych: Uneventful            Sign Out: Acceptable/Baseline neuro status   Airway/Respiratory: Uneventful            Sign Out: Acceptable/Baseline resp. status   CV/Hemodynamics: Uneventful            Sign Out: Acceptable CV status; No obvious hypovolemia; No obvious fluid overload   Other NRE: NONE   DID A NON-ROUTINE EVENT OCCUR?            Last vitals:  Vitals Value Taken Time   /76 04/08/25 1300   Temp 36.5  C (97.7  F) 04/08/25 1300   Pulse 81 04/08/25 1309   Resp 31 04/08/25 1309   SpO2 95 % 04/08/25 1309   Vitals shown include unfiled device data.    Electronically Signed By: Laina Dickson MD  April 8, 2025  1:39 PM

## 2025-04-08 NOTE — PROGRESS NOTES
General Surgery Progress Note:    Hospital Day # 8    ASSESSMENT:  1. Pressure injury of buttock, stage 2, unspecified laterality (H) [L89.302]    2. Perineal abscess    3. Non-healing surgical wound of right groin, initial encounter [T81.89XA]        Marly Cannon is a 59 year old female PMH COPD on home O2, DM2 12.7% 3/22, recently admitted for DKA and acute hypoxic respiratory failure with LL lobe PE, complicated by VAP, chronic decubitus ulcer s/p bedside I&D and Penrose placement 3/31 s/p incision and drainage of NSTI 4/2 with Penrose placements.     Requiring 35 LPM currently, otherwise vitally stable and without leukocytosis.  Wound appears more Una yesterday and today especially in the right labial region.  She would benefit for an OR washout of this wound.    PLAN:  -NPO  -Continue with turning/repositioning  -Plan for OR washout of the right groin wound today  -Medical management per primary team    SUBJECTIVE:   Marly Cannon was seen on rounds.  States she does not have much changes in pain or other symptoms.  She does feel as though she is getting better today is a good day.  She is nervous about any OR for surgery.  She tolerated dinner last night and eating yesterday and her appetite is returning and she finished her whole meal actually last night.  Denies nausea, vomiting, fever, chills.    She does receive help with her medical decisions from her Sister Mary who does not drive so will not be present here but she would like her call.    VITALS RANGE:  Temp:  [98.5  F (36.9  C)-99.2  F (37.3  C)] 98.5  F (36.9  C)  Pulse:  [80-88] 88  Resp:  [16-20] 16  BP: (137-160)/(63-82) 160/76  FiO2 (%):  [40 %] 40 %  SpO2:  [94 %-98 %] 96 %    PHYSICAL EXAM:  General: patient seen resting in bed in no acute distress, more talkative this morning and responsive  Resp: no increased work of breathing, breathing comfortably on 35 LPM  Right groin area: Removed packing and while the more open area appears a  bit  still has some fibrinous dead tissue in it.  The right labia wounds with the Penrose appears still pretty murky in nature and with quite a bit of output on the Kerlix in place.  Extremities: No edema or cyanosis visualized on exam, no obvious deformities    04/07 0700 - 04/08 0659  In: 600 [P.O.:600]  Out: 3000 [Urine:3000]    No results displayed because visit has over 200 results.           ANNETTE Dudley [facility] General Surgery  [address] [place] [address] [phone]

## 2025-04-08 NOTE — PROGRESS NOTES
General Surgery:    This is a 59-year-old woman that presented with a necrotizing wound in her right groin that extends down into her right labia.  She had surgical debridement on 4/2/2025.  She has been getting wound care since then.  The wound continues to be purulent and is not clearing up as effectively as we would hope at this point and for that reason I am taking her back to the operating room for an updated surgical debridement and cleanout.    I discussed the plan with her boyfriend Tavo and her sister Mary Reynoso and they are both on board with the plan.    Casey Rossi MD  General Surgeon  [facility]  Surgery [address]  [place] [address]  Office: [phone]

## 2025-04-08 NOTE — ANESTHESIA PREPROCEDURE EVALUATION
Anesthesia Pre-Procedure Evaluation    Patient: Marly Cannon   MRN: [id number] : 1965        Procedure : Procedure(s):  IRRIGATION AND DEBRIDEMENT, WOUND, RIGHT GROIN AND LABIA          Past Medical History:   Diagnosis Date    COPD (chronic obstructive pulmonary disease) (H)     Diabetes (H)     Hypertension       Past Surgical History:   Procedure Laterality Date    BIOPSY BREAST Left 2008    patient states no bx, Clip in left breast/ stereo bxc  no path in EPIC that far back    COLONOSCOPY N/A 2015    Procedure: COLONOSCOPY;  Surgeon: Gentry Porter MD;  Location: [facility] OR    COLONOSCOPY N/A 2018    Procedure: COLONOSCOPY;  COLONOSCOPY WITH POLYPECTOMY;  Surgeon: Gentry Porter MD;  Location: [facility] OR    INCISION AND DRAINAGE PERINEAL, COMBINED N/A 2025    Procedure: INCISION AND DRAINAGE, PERINEUM;  Surgeon: Jeremy Lyons MD;  Location: [facility] OR      No Known Allergies   Social History     Tobacco Use    Smoking status: Former     Current packs/day: 1.00     Average packs/day: 1 pack/day for 46.3 years (46.3 ttl pk-yrs)     Types: Cigarettes     Start date: 1979     Passive exposure: Current    Smokeless tobacco: Never    Tobacco comments:     Declined QP 2020   Substance Use Topics    Alcohol use: No     Alcohol/week: 0.0 standard drinks of alcohol      Wt Readings from Last 1 Encounters:   25 112.7 kg (248 lb 7.3 oz)           Physical Exam    Airway        Mallampati: II   TM distance: < 3 FB   Neck ROM: full   Mouth opening: > 3 cm    Respiratory Devices and Support     Face Mask      Dental       (+) Modest Abnormalities - crowns, retainers, 1 or 2 missing teeth      Cardiovascular          Rhythm and rate: regular     Pulmonary           (+) decreased breath sounds           OUTSIDE LABS:  CBC:   Lab Results   Component Value Date    WBC 6.9 2025    WBC 6.9 2025    HGB 10.7 (L) 2025    HGB 10.7 (L)  04/07/2025    HCT 34.0 (L) 04/08/2025    HCT 33.0 (L) 04/07/2025     04/08/2025     04/07/2025     BMP:   Lab Results   Component Value Date     04/08/2025     04/07/2025    POTASSIUM 4.4 04/08/2025    POTASSIUM 4.2 04/07/2025    CHLORIDE 98 04/08/2025    CHLORIDE 100 04/07/2025    CO2 37 (H) 04/08/2025    CO2 38 (H) 04/07/2025    BUN 13.0 04/08/2025    BUN 14.3 04/07/2025    CR 1.23 (H) 04/08/2025    CR 1.15 (H) 04/07/2025     (H) 04/08/2025     (H) 04/08/2025     COAGS:   Lab Results   Component Value Date    PTT 39 (H) 03/24/2025    INR 1.24 (H) 03/31/2025     POC:   Lab Results   Component Value Date     (H) 09/21/2018     HEPATIC:   Lab Results   Component Value Date    ALBUMIN 1.8 (L) 03/31/2025    PROTTOTAL 4.9 (L) 03/31/2025    ALT 16 03/31/2025    AST 25 03/31/2025    ALKPHOS 62 03/31/2025    BILITOTAL 0.3 03/31/2025     OTHER:   Lab Results   Component Value Date    PH 7.44 04/01/2025    LACT 1.0 03/31/2025    A1C 12.7 (H) 03/22/2025    NORM 8.8 04/08/2025    PHOS 3.6 04/08/2025    MAG 1.8 04/08/2025    TSH 1.18 03/18/2025    CRP <2.9 07/01/2019       Anesthesia Plan    ASA Status:  3    NPO Status:  NPO Appropriate    Anesthesia Type: General.     - Airway: LMA   Induction: Intravenous.   Maintenance: Inhalation.   Techniques and Equipment:     - Lines/Monitors: CVL in situ     Consents    Anesthesia Plan(s) and associated risks, benefits, and realistic alternatives discussed. Questions answered and patient/representative(s) expressed understanding.     - Discussed: Risks, Benefits and Alternatives for BOTH SEDATION and the PROCEDURE were discussed     - Discussed with:  Patient      - Extended Intubation/Ventilatory Support Discussed: No.      - Patient is DNR/DNI Status: No     Use of blood products discussed: No .     Postoperative Care    Pain management: IV analgesics.   PONV prophylaxis: Ondansetron (or other 5HT-3), Dexamethasone or Solumedrol  "    Comments:    Other Comments: Anticipate need for high flow oxygen in PACU (requiring it pre-op on floor)           Laina Dickson MD    Clinically Significant Risk Factors             # Hypomagnesemia: Lowest Mg = 1.5 mg/dL in last 2 days, will replace as needed   # Hypoalbuminemia: Lowest albumin = 1.7 g/dL at 3/31/2025  5:35 PM, will monitor as appropriate     # Hypertension: Noted on problem list     # Acute Hypoxic Respiratory Failure: Documented O2 saturation < 90%. Continue supplemental oxygen as needed        # DMII: A1C = 12.7 % (Ref range: <5.7 %) within past 6 months   # Severe Obesity: Estimated body mass index is 41.35 kg/m  as calculated from the following:    Height as of 3/22/25: 1.651 m (5' 5\").    Weight as of this encounter: 112.7 kg (248 lb 7.3 oz).      # COPD: noted on problem list          "

## 2025-04-08 NOTE — PROGRESS NOTES
INFECTIOUS DISEASE FOLLOW UP NOTE  Date: 04/08/2025     ================================================================  SUBJECTIVE  No events    ================================================================  OBJECTIVE  BP (!) 160/76 (BP Location: Left arm)   Pulse 88   Temp 98.5  F (36.9  C) (Oral)   Resp 16   Wt 112.7 kg (248 lb 7.3 oz)   SpO2 96%   BMI 41.35 kg/m      Physical Exam  General: lethargic, no apparent distress  HEENT: atraumatic, normocephalic, no scleral icterus, moist mucus membranes, no cervical lymphadenopathy  Heart: Normal rate, regular rhythm  Lungs: good inspiratory effort  Abdomen: soft, non-tender, non-distended  Extremities: no peripheral edema. S/p surgery of perineum per below    4/7 right groin      Pertinent labs  CBC RESULTS:   Recent Labs   Lab Test 04/03/25  0417   WBC 11.3*   RBC 3.59*   HGB 11.0*   HCT 33.5*   MCV 93   MCH 30.6   MCHC 32.8   RDW 14.4           Imaging  3/22 CTA chest  1.  Very small pulmonary artery emboli to subsegmental branches of the left lower lobe. No right heart strain.  2.  Mild emphysematous changes of the lungs. No focal pulmonary consolidation or pleural effusion.  3.  Trace pericardial effusion.  4.  Small sliding-type hiatal hernia.     3/24 CTA chest  Exam positive for pulmonary embolism. There has been progression of emboli as seen previously. There is now involvement a segmental and subsegmental arteries of the right lower lobe. There is  now atelectasis of the entire right lower lobe with volume loss. There is some abnormal enhancement within portions of the right lower lobe which may be sequela of infarction/ischemia. There is opacification of bronchi within the right lower lobe. Mucous plugging may be present.12     3/24 CT A/P  There is gallbladder distention. A small amount of gallbladder sludge is possible but no calcified gallstones are seen and there is no biliary dilatation. There is diffuse subcutaneous edema. Small right  inguinal hernia containing fat. No loops of bowel are involved.     3/31 CT pelvis  Right inguinal hernia containing fat. Worsening subcutaneous edema and edema in the perineum and labia on the right as compared to previous examination. No discrete abscess is seen.     3/31 CT chest  1.  Complete right and near complete left lower lobe collapse.  2.  Mild groundglass density in the right middle lobe, nonspecific.    4/8 CT A/P  1.  Large surgical defect in the RLQ abdominal wall and right side of the perineum, containing surgical packing material and likely surgical drain. Small residual area of rim-enhancing fluid (suspected abscess) measures roughly 3 x 2 x 3 cm (series 2, image 99).  2.  Large amount of colonic stool, possibly from pain medication.  3.  Gas within the urinary bladder is probably refluxed from the Landrum catheter, but correlate with urinalysis to fully exclude the possibility of a coexisting urinary tract infection.        Microbiology data  3/17 flu/covid/rsv: negative  3/17 blood: NG  3/22 blood: NG  3/24 MRSA nares: negative  3/26 sputum culture: candida, GPCs  3/31 serum galactomannan: negative  3/31 beta-D glucan: normal  3/31 BAL              49 PMNs, 11% PMNs, 30% monocytes, 59% lining cells              Culture: NGTD              AFB culture: NGTD              Histoplasma Ag: negative              Nocardia culture: NGTD              Legionella: negative  4/1 blood: NGTD     I have personally reviewed the relevant laboratory, imaging, and microbiology data  ================================================================  Assessment  Marly Cannon is a 59 year old with perianal abscess.  PMH intellectual disability, COPD w/ severe obstruction, active tobacco use, obesity, ADEEL, secondary polycythemia, DM2, CKD3, HTN.     Recently admitted 3/18-3/20 for DKA + respiratory failure + condyloma acuminata.  Was discharged with insulin pump, 2L oxygen, acyclovir.  Presented again on 3/22 with  chest pain, shortness of breath, found to have DKA, distributive shock, respiratory failure, PE.  She required intubation, and this hospital course was complicated by VAP and perianal abscess s/p I&D with penrose placement on 3/31.  Chest CT on 3/31 showed bilateral lobe collapse.  She has been on pip-tazo since 3/23.  Underwent BAL on 3/31, studies are pending.      For now will continue broad antibiotics to cover both GI/ dre as well as hospital-associated pulmonary pathogens.  Over the last 7 days some of her clinical parameters have improved (downtrending WBC count, now extubated), but still significantly inflamed and with collapse of her lower lung lobes means duration of therapy will need to be extended.  In addition she continued to have feculant drainage from her perianal wound, so was taken back to the OR for I&D on 4/2, then again on 4/8.  Follow-up CT showed small abscess, overall clinically doing much better.     Impression  # Perianal abscess              - s/p penrose drain placement 3/31              - s/p I&D 4/2  # Condyloma acuminata              - s/p acyclovir in mid-March  # Acute hypoxic respiratory failure  # RML pneumonia, ventilator associated  # Bilateral lower lobe collapse  # Segmental pulmonary embolus  # Severe COPD  # Poorly controlled DM2 (A1C 12)     ================================================================  Plan  - Continue pip-tazo  - Given [id number] abscess on 4/7, anticipate ~10 additional days of antibiotics  - ID will follow    Jeremy Cannon MD, MPH  [facility]   Office Telephone [phone].  Fax [phone]  [facility] paging

## 2025-04-08 NOTE — PLAN OF CARE
Problem: Adult Inpatient Plan of Care  Goal: Absence of Hospital-Acquired Illness or Injury  Outcome: Met  Intervention: Identify and Manage Fall Risk  Recent Flowsheet Documentation  Taken 4/8/2025 0024 by Ruiz Cheng RN  Safety Promotion/Fall Prevention:   increase visualization of patient   patient and family education  Intervention: Prevent Skin Injury  Recent Flowsheet Documentation  Taken 4/8/2025 0001 by Ruiz Cheng RN  Body Position:   turned   left  Taken 4/7/2025 2201 by Ruiz Cheng RN  Body Position:   turned   right  Intervention: Prevent and Manage VTE (Venous Thromboembolism) Risk  Recent Flowsheet Documentation  Taken 4/8/2025 0024 by Ruiz Cheng RN  VTE Prevention/Management: SCDs on (sequential compression devices)  Goal: Optimal Comfort and Wellbeing  Outcome: Met  Intervention: Provide Person-Centered Care  Recent Flowsheet Documentation  Taken 4/8/2025 0024 by Ruiz Cheng RN  Trust Relationship/Rapport:   care explained   choices provided     Problem: Fall Injury Risk  Goal: Absence of Fall and Fall-Related Injury  Outcome: Met  Intervention: Promote Injury-Free Environment  Recent Flowsheet Documentation  Taken 4/8/2025 0024 by Ruiz Cheng RN  Safety Promotion/Fall Prevention:   increase visualization of patient   patient and family education     Problem: Pain Acute  Goal: Optimal Pain Control and Function  Outcome: Met   Goal Outcome Evaluation:    Pt.alert, oriented to self and place. NPO at 0000 for   I&D today. T/R every 2 hours. Incontinent of BM. Has Landrum in place. Has packing in place to right groin wound. Denies pain. VSS. Continue with current plan of cares.       /63 (BP Location: Left arm, Patient Position: Semi-Bahena's, Cuff Size: Adult Large)   Pulse 84   Temp 99.2  F (37.3  C) (Oral)   Resp 18   Wt 110.1 kg (242 lb 11.6 oz)   SpO2 96%   BMI 40.39 kg/m      Ruiz Cheng RN

## 2025-04-08 NOTE — ANESTHESIA CARE TRANSFER NOTE
Patient: Marly Cannon    Procedure: Procedure(s):  IRRIGATION AND DEBRIDEMENT, WOUND, RIGHT GROIN AND LABIA       Diagnosis: Perineal abscess [L02.215]  Diagnosis Additional Information: No value filed.    Anesthesia Type:   General     Note:    Oropharynx: oropharynx clear of all foreign objects and spontaneously breathing  Level of Consciousness: awake  Oxygen Supplementation: face mask  Level of Supplemental Oxygen (L/min / FiO2): 8  Independent Airway: airway patency satisfactory and stable  Dentition: dentition unchanged  Vital Signs Stable: post-procedure vital signs reviewed and stable  Report to RN Given: handoff report given  Patient transferred to: PACU    Handoff Report: Identifed the Patient, Identified the Reponsible Provider, Reviewed the pertinent medical history, Discussed the surgical course, Reviewed Intra-OP anesthesia mangement and issues during anesthesia, Set expectations for post-procedure period and Allowed opportunity for questions and acknowledgement of understanding      Vitals:  Vitals Value Taken Time   /73 04/08/25 1125   Temp 97.2f    Pulse 78 04/08/25 1128   Resp 18 04/08/25 1128   SpO2 95 % 04/08/25 1128   Vitals shown include unfiled device data.    Electronically Signed By: BLAIR Gooden CRNA  April 8, 2025  11:29 AM

## 2025-04-08 NOTE — PROGRESS NOTES
[facility] Nurse Inpatient Assessment     Consulted for: right groin region potential for wound vac; original consult - butocks    Summary: 4/8 - Patient to OR today with surgery, possible vac placement. Will work with surgical team to coordinate post op wound care needs.     4/7 - Saw with surgery PA bedside for assessment of right groin wound. Due to current status of wound, surgery is wanting to wait on VAC application. Noted when charting that pt is scheduled for further surgical debridement 4/8/25.    From initial consult: RN reported patient can be difficult to turn    [facility] nurse follow-up plan: weekly    Patient History (according to provider note(s):      Marly Cannon is a 59 year old woman with history of intellectual disability & memory issues, COPD*on supplemental oxygen, untreated ADEEL, tobacco use d/o, HTN, pAFib, & poorly controlled T2DM w/ recent hospitalization for DKA (3/18 - 3/20), who presented to the hospital in [facility] on 3/22/2025 with complaints of dyspnea, hyperglycemia, and chest pain. She was found to have an YADIRA, LLL PE, DKA, and A-fib with RVR. She was started on an insulin drip, therapeutic dose enoxaparin, Zosyn for possible pneumonia, and diltiazem drip for management of her RVR. Her DKA has resolved, she was switched from diltiazem to amiodarone drip for management of A-fib with RVR; however, her degree of respiratory failure was still concerning and she was intubated 3/23 for management of worsening hypoxia. She had a repeat CTA that demonstrated progression of her PE and RLL collapse concerning for mucous plugging and pneumonia. She was switched from enoxaparin to heparin drip and her antibiotics were broadened to vancomycin and Zosyn. She was on norepinephrine for several days d/t hypotension attributed to sedation & sepsis-related vasoplegia. It appears that she has been doing fairly long SBTs since 3/26, which seemed to be going reasonably well per  RT documentation. Primary team has been having issues w/ progressively increasing FiO2 needs. She had a sputum culture grow Candida & has been on fluconazole since 3/25. She has remained on the insulin drip, presumably to manage the persistent hyperglycemia during her steroid burst (for the presumed COPD exacerbation). She has received a fairly large volume of documented fluids, & was started on diuresis 3/30. She was transferred to [facility] d/t concerns that she will require a tracheostomy secondary to her inability to liberate from the vent.     4/2/25  Preoperative Diagnosis: Perineal and right groin infection     Postoperative Diagnosis: Perineal and right groin necrotizing soft tissue infection     Procedure: Incision and drainage of perineum and right groin     Findings: Infection traveling up the right labia into the right groin.  Final debridement included a right groin defect measuring 18 x 8 x 5 cm and a perineal wound measuring 6 x 2 x 1 cm.       Assessment:      Wound location: Right groin    4/7/25 4/7/25    Last photo: 4/7/25  Wound due to: Surgical Wound  Wound history/plan of care: I&D 4/2/25  Wound base: see photo - mix of adipose, clean red, and necrotic gray     Palpation of the wound bed: normal      Drainage:  heavy     Description of drainage: serosanguinous and tan     Measurements (length x width x depth, in cm): groin wound measuring 5cm x 19cm x 3cm     Tunneling: up to 7cm from 10 o'clock; also tunnels as visible in above photo to labia wound (not measured today)     Undermining: from 10-2 o'clock; up to 7.5cm at 2 o'clock  Periwound skin: Intact and some remaining comedones      Color: normal and consistent with surrounding tissue      Temperature: normal   Odor: none  Pain: facial expression of distress intermittently during cares; pt declined pain medication multiple times during cares  Pain interventions prior to dressing change: slow and gentle cares  and  distraction  Treatment goal: Drainage control and Infection control/prevention  STATUS: initial assessment  Supplies ordered: supplies stored on unit, discussed with RN, and discussed with patient        Pressure Injury Location: Bilateral buttocks - not assessed 4/7/25 4/1    Last photo: 4/1  Wound type: Pressure Injury and Friction     Pressure Injury Stage: 2, present on admission   Wound history/plan of care:   there is a darker area to the gluteal cleft with some maceration. Unclear if it is a developing DTPI but will monitor for changes    Wound base: 100 % Epidermis     Palpation of the wound bed: normal      Drainage: scant     Description of drainage: serosanguinous     Measurements (length x width x depth, in cm) L 3 x 3 x 0.1cm ; R 2  x 2.4  x  0.1 cm      Tunneling N/A     Undermining N/A  Periwound skin: Intact      Color: normal and consistent with surrounding tissue      Temperature: normal   Odor: none  Pain: mild, tender  Pain intervention prior to dressing change: slow and gentle cares   Treatment goal: Infection control/prevention, Protection, and Promote epidermal migration  STATUS: initial assessment  Supplies ordered: supplies stored on unit    My PI Risk Assessment     Sensory Perception: 3 - Slightly Limited     Moisture: 2 - Very moist      Activity: 1 - Bedfast      Mobility: 2 - Very limited     Nutrition: 2 - Probably inadequate      Friction/Shear: 2 - Potential problem      TOTAL: 12     Treatment Plan:   See orders from surgery for R groin wound.    Bilateral buttocks wounds: Every 3 days   Cleanse the area with wound cleanser and pat dry.  Apply No sting film barrier to periwound skin.  Cover wound with Sacral Mepilex (#[id number])  Change dressing Q 3 days.  Turn and reposition Q 2hrs side to side only.  Ensure pt has Pablo-cushion while sitting up in the chair.  If not in ICU: Ensure air pump on bed and set to Isoflex.  FYI- If pt has constant incontinent loose stools needing dressing  changes Q shift please discontinue the Mepilex dressing and apply criticaid barrier paste BID and PRN.    Orders: Reviewed    RECOMMEND PRIMARY TEAM ORDER: None, at this time  Education provided: plan of care, wound progress, and Moisture management  Discussed plan of care with: Patient, Nurse, and Physicians Assistant  Notify [facility] if wound(s) deteriorate.  Nursing to notify the Provider(s) and re-consult the WO Nurse if new skin concern.    DATA:     Current support surface: Standard  Low air loss (SIMIN pump, Isolibrium, Pulsate)  Containment of urine/stool: Incontinence Protocol and Indwelling catheter  BMI: Body mass index is 41.35 kg/m .   Active diet order: Orders Placed This Encounter      NPO for Procedure/Surgery per Anesthesia Guidelines     Output: I/O last 3 completed shifts:  In: 600 [P.O.:600]  Out: 3000 [Urine:3000]     Labs:   Recent Labs   Lab 04/08/25  0511   HGB 10.7*   WBC 6.9     Pressure injury risk assessment:   Sensory Perception: 2-->very limited  Moisture: 3-->occasionally moist  Activity: 1-->bedfast  Mobility: 2-->very limited  Nutrition: 2-->probably inadequate  Friction and Shear: 1-->problem  Vicente Score: 11    Radha Heart RN CWOCN  Pager no longer in use, please contact through [employer] group: [facility] [id number] Group

## 2025-04-08 NOTE — PROGRESS NOTES
Placed back on HFNC after surgery due to intermittent desaturations.    Michael Mckee, RT  4/8/2025

## 2025-04-08 NOTE — PROGRESS NOTES
Pt transported to Imaging/CT on 10L Oxymask. RT with for transport. No complications noted. Placed back on HHFNC 40L/40% for the evening.     Maximus Ennis, RT

## 2025-04-08 NOTE — PROGRESS NOTES
HFNC was titrated to 35L 40%.  Trialled on OxyMask 5L with SpO2 95%.  Transported PT to [facility].  Currently doing well on OxyMask with the HFNC on stand-by at bedside.    Michael Mckee, RT  4/8/2025

## 2025-04-08 NOTE — PROGRESS NOTES
[facility]    Medicine Progress Note - Hospitalist Service    Date of Admission:  3/31/2025    Assessment & Plan   59-year-old female patient who was transferred from [facility] on 3/31 for difficulty of weaning off mechanical ventilation.  She is also receiving treatment for perianal abscess. Past medical history significant for hypertension,ckd3,  type 2 diabetes mellitus, intellectual disability, hyperparathyroidism, subsegmental PE, COPD on home O2, untreated ADEEL.     Acute hypoxic respiratory failure due to Ventilator associated pneumonia  -Underlying severe COPD  -Was extubated on 4/1 after prolonged mechanical ventilation  - Patient is now placed back on high flow nasal cannula at 40 L, 40% FiO2  -Continue Zosyn  -Taper oxygen as able.   -Continue with respiratory hygiene and nebulization therapy   - BiPAP at night. She usually refuses  - PT/OT, planning to discharge to [facility]    Segmental and subsegmental PE  - was On heparin gtt,  switched to Eliquis 5 mg oral twice daily    Perianal abscess  Condyloma acuminata  - completed acyclovir in mid March  - s/p I&D with Penrose drain placement on 3/31  - had another I and D on 04/02, 04/08  - Continue Zosyn  - ID on board  - Abdomen pelvis CT : Large surgical defect in the RLQ abdominal wall and right side of the perineum, containing surgical packing material and likely surgical drain. Small residual area of rim-enhancing fluid (suspected abscess) measures roughly 3 x 2 x 3 cm   - Taken to the OR for repeat I&D today on 4/8  - CRP is trending down    Type 2 diabetes mellitus, poorly controlled  S/p DKA treatment recently  - Hemoglobin A1c is 12.7  - Lantus 15 units subcutaneously  - high intensity insulin sliding scale  - Accu-Checks  - Hypoglycemia protocol    YADIRA on CKD stage III  - Baseline creatinine is around 1.1-1.3,-->1.56-->1.24  - Monitor    Anemia  - Most likely related to chronic medical illness  - Monitor          Diet:  "Snacks/Supplements Adult: Magic Cup; With Meals  Advance Diet as Tolerated: Clear Liquid Diet; Mechanical/Dental Soft Diet    DVT Prophylaxis: DOAC  Landurm Catheter: PRESENT, indication: Wound deterioration and failed external collection device, Surgical procedure  Lines: PRESENT      CVC Triple Lumen Right Internal jugular-Site Assessment: WDL      Cardiac Monitoring: None  Code Status: Full Code      Clinically Significant Risk Factors             # Hypomagnesemia: Lowest Mg = 1.5 mg/dL in last 2 days, will replace as needed   # Hypoalbuminemia: Lowest albumin = 1.7 g/dL at 3/31/2025  5:35 PM, will monitor as appropriate     # Hypertension: Noted on problem list     # Acute Hypoxic Respiratory Failure: Documented O2 saturation < 90%. Continue supplemental oxygen as needed        # DMII: A1C = 12.7 % (Ref range: <5.7 %) within past 6 months   # Severe Obesity: Estimated body mass index is 41.35 kg/m  as calculated from the following:    Height as of 3/22/25: 1.651 m (5' 5\").    Weight as of this encounter: 112.7 kg (248 lb 7.3 oz).      # COPD: noted on problem list        Social Drivers of Health    Tobacco Use: Medium Risk (4/2/2025)    Patient History     Smoking Tobacco Use: Former     Smokeless Tobacco Use: Never     Passive Exposure: Current   Physical Activity: Unknown (5/16/2024)    Exercise Vital Sign     Days of Exercise per Week: 0 days   Social Connections: Unknown (5/16/2024)    Social Connection and Isolation Panel [NHANES]     Frequency of Social Gatherings with Friends and Family: More than three times a week          Disposition Plan     Medically Ready for Discharge: Anticipated in 2-4 Days             Noam Schneider MD  Hospitalist Service  [facility]  Securely message with [employer] (more info)  Text page via [employer] Paging/Directory   ______________________________________________________________________    Interval History   Patient is chronically sick looking.  She " No denies having significant pain or discomfort currently plan for I&D today.  Management plan discussed with the patient and her sister over the phone.    Physical Exam   Vital Signs: Temp: 98.2  F (36.8  C) Temp src: Oral BP: (!) 151/70 Pulse: 81   Resp: 20 SpO2: 93 % O2 Device: (S) High Flow Nasal Cannula (HFNC) Oxygen Delivery: 35 LPM  Weight: 248 lbs 7.33 oz    General Appearance:  No distress noted  Respiratory: Good air entry bilaterally  Cardiovascular: S1 and S2 heard, no murmur or gallop  GI: Soft abdomen, right lower abdominal tenderness, normoactive bowel sounds  Skin: Intact and warm       Medical Decision Making       40 MINUTES SPENT BY ME on the date of service doing chart review, history, exam, documentation & further activities per the note.      Data

## 2025-04-08 NOTE — PLAN OF CARE
"  Problem: Adult Inpatient Plan of Care  Goal: Plan of Care Review  Description: The Plan of Care Review/Shift note should be completed every shift.  The Outcome Evaluation is a brief statement about your assessment that the patient is improving, declining, or no change.  This information will be displayed automatically on your shiftnote.  Outcome: Progressing  Goal: Patient-Specific Goal (Individualized)  Description: You can add care plan individualizations to a care plan. Examples of Individualization might be:  \"Parent requests to be called daily at 9am for status\", \"I have a hard time hearing out of my right ear\", or \"Do not touch me to wake me up as it startlesme\".  Outcome: Progressing  Goal: Absence of Hospital-Acquired Illness or Injury  Intervention: Identify and Manage Fall Risk  Recent Flowsheet Documentation  Taken 4/8/2025 0736 by Masha Reyes RN  Safety Promotion/Fall Prevention:   increase visualization of patient   patient and family education  Intervention: Prevent Skin Injury  Recent Flowsheet Documentation  Taken 4/8/2025 1713 by Masha Reyes RN  Body Position:   supine, legs elevated   supine, head elevated  Intervention: Prevent and Manage VTE (Venous Thromboembolism) Risk  Recent Flowsheet Documentation  Taken 4/8/2025 0736 by Masha Reyes RN  VTE Prevention/Management: SCDs on (sequential compression devices)  Goal: Optimal Comfort and Wellbeing  Intervention: Provide Person-Centered Care  Recent Flowsheet Documentation  Taken 4/8/2025 1713 by Masha Reyes RN  Trust Relationship/Rapport:   care explained   choices provided  Taken 4/8/2025 1332 by Masha Reyes RN  Trust Relationship/Rapport:   care explained   choices provided  Taken 4/8/2025 0736 by Masha Reyes RN  Trust Relationship/Rapport:   care explained   choices provided   Goal Outcome Evaluation:       Dressing intact and dry ,denied any pain or any discomfort ate dinner with supervision less " coughing with thin liquids when not using Straw  .

## 2025-04-08 NOTE — OP NOTE
Operative Note    Name:  Marly Cannon  Location: [facility] Main OR  Procedure Date:  3/31/2025 - 4/8/2025  PCP:  Amberly Whyte    Surgery Performed:  Procedure/Surgery Information   Procedure: Procedure(s):  IRRIGATION AND DEBRIDEMENT, WOUND, RIGHT GROIN AND LABIA   Surgeon(s): Surgeons and Role:     * Casey Rossi MD - Primary     * Roxana Godfrey PA-C - Assisting  Their assistance was required for exposure and visualization    Specimens: * No specimens in log *            Pre-Procedure Diagnosis:  Perineal abscess [L02.215]    Post-Procedure Diagnosis:    Perineal abscess [L02.215]  Right Side.      Anesthesia:  General       Findings:  The inguinal dissection was largely clean, the Right Labial area was purulent.      Operative Report:    Patient is brought to the operating room placed in lithotomy position and given LMA anesthesia.  She is sterilely prepped with a Hibiclens prep all through the pelvic region.  The exposed wound in the right groin looks reasonably good.  There is no dead tissue in site.  The right labia is more indurated.  The tunnel going to the lateral aspect of the right labia was dissected into a larger space where I could advance retractors down from the upper portion and up from the lower portion.  Is able to irrigate this entire tunnel out with pulse lavage with 2 L of normal saline.  I placed a new Penrose drain across this tunnel I then packed this wound with a Curlex that goes all through the tunnel.  The Curlex and comes up into the right inguinal wound and is layered out in the inguinal floor.  It was then covered with gauze and ABD pads.  Some mesh panties were placed across the perineum to support the dressings.  The patient was woken up and taken to recovery.    estimated Blood Loss:   20 cc       Drains:   Drain Open Right;Anterior Groin  (Active)       Urinary Drain 03/31/25 Urethral Catheter (Active)   Tube Description Positional 04/08/25 0736   Catheter Care  Catheter wipes;Soap and water/perineal cleanser 04/08/25 0939   Perineal Skin Assessment Denuded;Edema 04/08/25 0024   Collection Container Standard 04/08/25 0736   Securement Method Commercial securement device 04/08/25 0736   Rationale for Continued Use Wound deterioration and failed external collection device 04/08/25 0736   Urine Output 750 mL 04/08/25 0939       Implants:  * No implants in log *    Complications:    None    Casey Rossi MD     Date: 4/8/2025  Time: 11:29 AM    No / Patient refused

## 2025-04-09 ENCOUNTER — APPOINTMENT (OUTPATIENT)
Dept: OCCUPATIONAL THERAPY | Facility: HOSPITAL | Age: 60
DRG: 981 | End: 2025-04-09
Attending: STUDENT IN AN ORGANIZED HEALTH CARE EDUCATION/TRAINING PROGRAM
Payer: COMMERCIAL

## 2025-04-09 ENCOUNTER — APPOINTMENT (OUTPATIENT)
Dept: PHYSICAL THERAPY | Facility: HOSPITAL | Age: 60
DRG: 981 | End: 2025-04-09
Attending: STUDENT IN AN ORGANIZED HEALTH CARE EDUCATION/TRAINING PROGRAM
Payer: COMMERCIAL

## 2025-04-09 ENCOUNTER — APPOINTMENT (OUTPATIENT)
Dept: RADIOLOGY | Facility: HOSPITAL | Age: 60
DRG: 981 | End: 2025-04-09
Attending: STUDENT IN AN ORGANIZED HEALTH CARE EDUCATION/TRAINING PROGRAM
Payer: COMMERCIAL

## 2025-04-09 LAB
ANION GAP SERPL CALCULATED.3IONS-SCNC: 2 MMOL/L (ref 7–15)
BUN SERPL-MCNC: 19 MG/DL (ref 8–23)
CALCIUM SERPL-MCNC: 9.1 MG/DL (ref 8.8–10.4)
CHLORIDE SERPL-SCNC: 96 MMOL/L (ref 98–107)
CREAT SERPL-MCNC: 1.27 MG/DL (ref 0.51–0.95)
EGFRCR SERPLBLD CKD-EPI 2021: 48 ML/MIN/1.73M2
ERYTHROCYTE [DISTWIDTH] IN BLOOD BY AUTOMATED COUNT: 13.7 % (ref 10–15)
GLUCOSE BLDC GLUCOMTR-MCNC: 149 MG/DL (ref 70–99)
GLUCOSE BLDC GLUCOMTR-MCNC: 166 MG/DL (ref 70–99)
GLUCOSE BLDC GLUCOMTR-MCNC: 229 MG/DL (ref 70–99)
GLUCOSE BLDC GLUCOMTR-MCNC: 230 MG/DL (ref 70–99)
GLUCOSE BLDC GLUCOMTR-MCNC: 285 MG/DL (ref 70–99)
GLUCOSE SERPL-MCNC: 238 MG/DL (ref 70–99)
HCO3 SERPL-SCNC: 38 MMOL/L (ref 22–29)
HCT VFR BLD AUTO: 33.9 % (ref 35–47)
HGB BLD-MCNC: 10.7 G/DL (ref 11.7–15.7)
MAGNESIUM SERPL-MCNC: 1.8 MG/DL (ref 1.7–2.3)
MCH RBC QN AUTO: 29.8 PG (ref 26.5–33)
MCHC RBC AUTO-ENTMCNC: 31.6 G/DL (ref 31.5–36.5)
MCV RBC AUTO: 94 FL (ref 78–100)
PHOSPHATE SERPL-MCNC: 3.7 MG/DL (ref 2.5–4.5)
PLATELET # BLD AUTO: 263 10E3/UL (ref 150–450)
POTASSIUM SERPL-SCNC: 4.7 MMOL/L (ref 3.4–5.3)
RBC # BLD AUTO: 3.59 10E6/UL (ref 3.8–5.2)
SODIUM SERPL-SCNC: 136 MMOL/L (ref 135–145)
WBC # BLD AUTO: 7.9 10E3/UL (ref 4–11)

## 2025-04-09 PROCEDURE — 250N000013 HC RX MED GY IP 250 OP 250 PS 637

## 2025-04-09 PROCEDURE — 250N000009 HC RX 250

## 2025-04-09 PROCEDURE — 99232 SBSQ HOSP IP/OBS MODERATE 35: CPT | Performed by: STUDENT IN AN ORGANIZED HEALTH CARE EDUCATION/TRAINING PROGRAM

## 2025-04-09 PROCEDURE — 97535 SELF CARE MNGMENT TRAINING: CPT | Mod: GO

## 2025-04-09 PROCEDURE — 97530 THERAPEUTIC ACTIVITIES: CPT | Mod: GP

## 2025-04-09 PROCEDURE — 94660 CPAP INITIATION&MGMT: CPT

## 2025-04-09 PROCEDURE — 999N000156 HC STATISTIC RCP CONSULT EA 30 MIN

## 2025-04-09 PROCEDURE — 999N000157 HC STATISTIC RCP TIME EA 10 MIN

## 2025-04-09 PROCEDURE — G0463 HOSPITAL OUTPT CLINIC VISIT: HCPCS

## 2025-04-09 PROCEDURE — F08H5CZ WOUND MANAGEMENT TREATMENT OF INTEGUMENTARY SYSTEM - WHOLE BODY USING MECHANICAL EQUIPMENT: ICD-10-PCS | Performed by: SURGERY

## 2025-04-09 PROCEDURE — 99232 SBSQ HOSP IP/OBS MODERATE 35: CPT | Performed by: INTERNAL MEDICINE

## 2025-04-09 PROCEDURE — 99231 SBSQ HOSP IP/OBS SF/LOW 25: CPT | Mod: 24

## 2025-04-09 PROCEDURE — 999N000215 HC STATISTIC HFNC ADULT NON-CPAP

## 2025-04-09 PROCEDURE — 250N000013 HC RX MED GY IP 250 OP 250 PS 637: Performed by: STUDENT IN AN ORGANIZED HEALTH CARE EDUCATION/TRAINING PROGRAM

## 2025-04-09 PROCEDURE — 83735 ASSAY OF MAGNESIUM: CPT

## 2025-04-09 PROCEDURE — 97606 NEG PRS WND THER DME>50 SQCM: CPT

## 2025-04-09 PROCEDURE — 120N000004 HC R&B MS OVERFLOW

## 2025-04-09 PROCEDURE — 80048 BASIC METABOLIC PNL TOTAL CA: CPT

## 2025-04-09 PROCEDURE — 250N000011 HC RX IP 250 OP 636

## 2025-04-09 PROCEDURE — 85027 COMPLETE CBC AUTOMATED: CPT

## 2025-04-09 PROCEDURE — 94799 UNLISTED PULMONARY SVC/PX: CPT

## 2025-04-09 PROCEDURE — 71045 X-RAY EXAM CHEST 1 VIEW: CPT

## 2025-04-09 PROCEDURE — 94640 AIRWAY INHALATION TREATMENT: CPT

## 2025-04-09 PROCEDURE — 250N000009 HC RX 250: Performed by: STUDENT IN AN ORGANIZED HEALTH CARE EDUCATION/TRAINING PROGRAM

## 2025-04-09 PROCEDURE — G0463 HOSPITAL OUTPT CLINIC VISIT: HCPCS | Mod: 25

## 2025-04-09 PROCEDURE — 94640 AIRWAY INHALATION TREATMENT: CPT | Mod: 76

## 2025-04-09 PROCEDURE — 84100 ASSAY OF PHOSPHORUS: CPT

## 2025-04-09 RX ORDER — SODIUM CHLORIDE FOR INHALATION 3 %
3 VIAL, NEBULIZER (ML) INHALATION 2 TIMES DAILY
Status: DISCONTINUED | OUTPATIENT
Start: 2025-04-09 | End: 2025-04-11 | Stop reason: HOSPADM

## 2025-04-09 RX ORDER — ATORVASTATIN CALCIUM 40 MG/1
40 TABLET, FILM COATED ORAL AT BEDTIME
Status: DISCONTINUED | OUTPATIENT
Start: 2025-04-09 | End: 2025-04-11 | Stop reason: HOSPADM

## 2025-04-09 RX ORDER — ASPIRIN 81 MG/1
81 TABLET, CHEWABLE ORAL DAILY
Status: DISCONTINUED | OUTPATIENT
Start: 2025-04-09 | End: 2025-04-11 | Stop reason: HOSPADM

## 2025-04-09 RX ORDER — MAGNESIUM OXIDE 400 MG/1
400 TABLET ORAL EVERY 4 HOURS
Status: COMPLETED | OUTPATIENT
Start: 2025-04-09 | End: 2025-04-09

## 2025-04-09 RX ORDER — ALBUTEROL SULFATE 0.83 MG/ML
2.5 SOLUTION RESPIRATORY (INHALATION)
Status: DISCONTINUED | OUTPATIENT
Start: 2025-04-09 | End: 2025-04-11 | Stop reason: HOSPADM

## 2025-04-09 RX ORDER — LISINOPRIL 20 MG/1
40 TABLET ORAL DAILY
Status: DISCONTINUED | OUTPATIENT
Start: 2025-04-09 | End: 2025-04-11 | Stop reason: HOSPADM

## 2025-04-09 RX ADMIN — PIPERACILLIN AND TAZOBACTAM 3.38 G: 3; .375 INJECTION, POWDER, FOR SOLUTION INTRAVENOUS at 21:01

## 2025-04-09 RX ADMIN — POLYETHYLENE GLYCOL 3350 17 G: 17 POWDER, FOR SOLUTION ORAL at 09:06

## 2025-04-09 RX ADMIN — ATORVASTATIN CALCIUM 40 MG: 40 TABLET, FILM COATED ORAL at 21:01

## 2025-04-09 RX ADMIN — SODIUM CHLORIDE SOLN NEBU 3% 3 ML: 3 NEBU SOLN at 07:22

## 2025-04-09 RX ADMIN — SODIUM CHLORIDE SOLN NEBU 3% 3 ML: 3 NEBU SOLN at 00:15

## 2025-04-09 RX ADMIN — PIPERACILLIN AND TAZOBACTAM 3.38 G: 3; .375 INJECTION, POWDER, FOR SOLUTION INTRAVENOUS at 05:36

## 2025-04-09 RX ADMIN — UMECLIDINIUM BROMIDE AND VILANTEROL TRIFENATATE 1 PUFF: 62.5; 25 POWDER RESPIRATORY (INHALATION) at 13:29

## 2025-04-09 RX ADMIN — APIXABAN 10 MG: 5 TABLET, FILM COATED ORAL at 21:01

## 2025-04-09 RX ADMIN — ALBUTEROL SULFATE 2.5 MG: 2.5 SOLUTION RESPIRATORY (INHALATION) at 00:15

## 2025-04-09 RX ADMIN — SODIUM CHLORIDE SOLN NEBU 3% 3 ML: 3 NEBU SOLN at 21:23

## 2025-04-09 RX ADMIN — ALBUTEROL SULFATE 2.5 MG: 2.5 SOLUTION RESPIRATORY (INHALATION) at 11:54

## 2025-04-09 RX ADMIN — ACETAMINOPHEN 975 MG: 325 TABLET ORAL at 05:37

## 2025-04-09 RX ADMIN — ALBUTEROL SULFATE 2.5 MG: 2.5 SOLUTION RESPIRATORY (INHALATION) at 07:22

## 2025-04-09 RX ADMIN — MAGNESIUM OXIDE TAB 400 MG (241.3 MG ELEMENTAL MG) 400 MG: 400 (241.3 MG) TAB at 12:27

## 2025-04-09 RX ADMIN — ACETAMINOPHEN 975 MG: 325 TABLET ORAL at 13:23

## 2025-04-09 RX ADMIN — MAGNESIUM OXIDE TAB 400 MG (241.3 MG ELEMENTAL MG) 400 MG: 400 (241.3 MG) TAB at 09:02

## 2025-04-09 RX ADMIN — ACETAMINOPHEN 975 MG: 325 TABLET ORAL at 21:01

## 2025-04-09 RX ADMIN — ALBUTEROL SULFATE 2.5 MG: 2.5 SOLUTION RESPIRATORY (INHALATION) at 04:22

## 2025-04-09 RX ADMIN — AMLODIPINE BESYLATE 5 MG: 5 TABLET ORAL at 09:01

## 2025-04-09 RX ADMIN — SENNOSIDES AND DOCUSATE SODIUM 1 TABLET: 50; 8.6 TABLET ORAL at 09:06

## 2025-04-09 RX ADMIN — APIXABAN 10 MG: 5 TABLET, FILM COATED ORAL at 09:02

## 2025-04-09 RX ADMIN — ALBUTEROL SULFATE 2.5 MG: 2.5 SOLUTION RESPIRATORY (INHALATION) at 21:23

## 2025-04-09 RX ADMIN — PIPERACILLIN AND TAZOBACTAM 3.38 G: 3; .375 INJECTION, POWDER, FOR SOLUTION INTRAVENOUS at 12:27

## 2025-04-09 ASSESSMENT — ACTIVITIES OF DAILY LIVING (ADL)
ADLS_ACUITY_SCORE: 67
ADLS_ACUITY_SCORE: 57
ADLS_ACUITY_SCORE: 63
ADLS_ACUITY_SCORE: 57
ADLS_ACUITY_SCORE: 67
ADLS_ACUITY_SCORE: 63
ADLS_ACUITY_SCORE: 67
ADLS_ACUITY_SCORE: 66
ADLS_ACUITY_SCORE: 67
ADLS_ACUITY_SCORE: 63
ADLS_ACUITY_SCORE: 67
ADLS_ACUITY_SCORE: 66
ADLS_ACUITY_SCORE: 67
ADLS_ACUITY_SCORE: 67
ADLS_ACUITY_SCORE: 63
ADLS_ACUITY_SCORE: 63

## 2025-04-09 NOTE — PROGRESS NOTES
General Surgery Progress Note:    Hospital Day # 9    ASSESSMENT:  1. Pressure injury of buttock, stage 2, unspecified laterality (H) [L89.302]    2. Perineal abscess    3. Non-healing surgical wound of right groin, initial encounter [T81.89XA]        Marly Cannon is a 59 year old female PMH COPD on home O2, DM2 12.7% 3/22, recently admitted for DKA and acute hypoxic respiratory failure with LL lobe PE, complicated by VAP, chronic decubitus ulcer s/p bedside I&D and Penrose placement 3/31 s/p incision and drainage of NSTI 4/2 with Penrose placements and takeback 4/8 for further washout and replacement of Penrose drain.     She remains on 35 LPM otherwise labs are stable and wound appears clean today. Successful placement of wound vac over upper right groin wound. Will continue with packing BID on the lower / posterior labial wound and evaluate on Friday.    PLAN:  -Appreciate WOC evaluation and placement of wound vac; expected next change on 4/11  -Continue BID vashe moistened packing to the right lower labial wound / tunnel; leave vac and vac dressings in place (for today change if soiled with stool)  -ABX per ID  -Okay for diet per surgical standpoint with supplements for wound healing  -No further surgical intervention planned, will continue to evaluate with WOC  -Medical management per primary team    SUBJECTIVE:   Marly Cannon was seen on rounds. States she does not really want the wound cares done. Did explain the purpose for the wound vac and the benefit to them and she was amenable to placing. Denies other changes and feels everything is sore, increased pain with touching. Denies other new symptoms.    VITALS RANGE:  Temp:  [96.3  F (35.7  C)-98.5  F (36.9  C)] 97.7  F (36.5  C)  Pulse:  [76-87] 87  Resp:  [17-22] 20  BP: (130-162)/(64-79) 146/64  FiO2 (%):  [40 %-45 %] 40 %  SpO2:  [90 %-97 %] 96 %    PHYSICAL EXAM:  General: patient seen resting in bed in no acute distress  Resp: no increased work of  breathing, breathing comfortably on 35 LPM  Abdomen: generally soft nondistended nontender  Right groin: appears as in WOC note and images today. Generally right groin wound is clean at base and tunneling down right labia remains clean as well. Able to place foam into all tunnels and achieve a good seal / vacuum  Extremities: No edema or cyanosis visualized on exam, no obvious deformities    04/08 0700 - 04/09 0659  In: 440 [P.O.:240; I.V.:200]  Out: 3450 [Urine:3450]    No results displayed because visit has over 200 results.           ANNETTE [facility] General Surgery  [address] [place] [address] [phone]

## 2025-04-09 NOTE — PROGRESS NOTES
Pt reports no pain while at rest, with repositioning and dressing change has some pain. Declines any pain medications.   Groin dressing soiled with stool x2. Rectal tube placed per orders. Dressings changed when saturated.   VSS on HiFlow oxygen. Alert to self, place and occasionally time. Does not appear to understand the situation.     Problem: Adult Inpatient Plan of Care  Goal: Plan of Care Review  Description: The Plan of Care Review/Shift note should be completed every shift.  The Outcome Evaluation is a brief statement about your assessment that the patient is improving, declining, or no change.  This information will be displayed automatically on your shiftnote.  Outcome: Not Progressing     Problem: Risk for Delirium  Goal: Improved Sleep  Outcome: Progressing     Problem: Skin Injury Risk Increased  Goal: Skin Health and Integrity  Outcome: Progressing  Intervention: Plan: Nurse Driven Intervention: Moisture Management  Recent Flowsheet Documentation  Taken 4/9/2025 0000 by Roxana Obrien RN  Moisture Interventions:   Urinary collection device   Incontinence pad  Taken 4/8/2025 2000 by Roxana Obrien RN  Moisture Interventions:   Urinary collection device   Incontinence pad  Intervention: Plan: Nurse Driven Intervention: Friction and Shear  Recent Flowsheet Documentation  Taken 4/9/2025 0000 by Roxana Obrien, RN  Friction/Shear Interventions:   HOB 30 degrees or less   Silicone foam sacral dressing  Taken 4/8/2025 2000 by Roxana Obrien RN  Friction/Shear Interventions:   HOB 30 degrees or less   Silicone foam sacral dressing  Intervention: Optimize Skin Protection  Recent Flowsheet Documentation  Taken 4/9/2025 0000 by Roxana Obrien, RN  Activity Management: bedrest  Taken 4/8/2025 2000 by Roxana Obrien RN  Activity Management: bedrest     Problem: Comorbidity Management  Goal: Maintenance of Behavioral Health Symptom Control  Outcome: Progressing

## 2025-04-09 NOTE — DISCHARGE INSTRUCTIONS
Wound location: R groin  Change Days: MWF  Supplies: Large foam  Cleanse with: Saline, pat dry.  Suction: Continuous at -125 mmHg pressure.     Back up plan: If VAC malfunctions or unable to maintain seal: VAC dressing must be removed and reapplied within 2 hours of the incident. If floor staff is not able to reapply, place NS moistened gauze in wound bed and cover with appropriate dressing to keep wound bed moist.   Change wet-to-dry dressing BID and notify [facility] staff for reimplementation of VAC therapy when available.  Date canister. Chart canister output every shift.     R labia - BID  Cleanse with Vashe and place 1 fluff of Vashe moistened gauze into wound bed

## 2025-04-09 NOTE — PROGRESS NOTES
[facility]  WO Nurse Inpatient Assessment     Consulted for: R groin; original consult - buttocks    Summary: 4/9 Wound assessment completed with Surgical PA today. VAC placed to R groin wound, but unable to place to labia wound due to proximity to vagina/anus and inability to obtain/maintain a seal.    4/8 - Patient to OR today with surgery, possible vac placement. Will work with surgical team to coordinate post op wound care needs.     4/7 - Saw with surgery PA bedside for assessment of right groin wound. Due to current status of wound, surgery is wanting to wait on VAC application. Noted when charting that pt is scheduled for further surgical debridement 4/8/25.    From initial consult: RN reported patient can be difficult to turn    WO nurse follow-up plan: weekly    Patient History (according to provider note(s):      Marly Cannon is a 59 year old woman with history of intellectual disability & memory issues, COPD*on supplemental oxygen, untreated ADEEL, tobacco use d/o, HTN, pAFib, & poorly controlled T2DM w/ recent hospitalization for DKA (3/18 - 3/20), who presented to the hospital in [facility] on 3/22/2025 with complaints of dyspnea, hyperglycemia, and chest pain. She was found to have an YADIRA, LLL PE, DKA, and A-fib with RVR. She was started on an insulin drip, therapeutic dose enoxaparin, Zosyn for possible pneumonia, and diltiazem drip for management of her RVR. Her DKA has resolved, she was switched from diltiazem to amiodarone drip for management of A-fib with RVR; however, her degree of respiratory failure was still concerning and she was intubated 3/23 for management of worsening hypoxia. She had a repeat CTA that demonstrated progression of her PE and RLL collapse concerning for mucous plugging and pneumonia. She was switched from enoxaparin to heparin drip and her antibiotics were broadened to vancomycin and Zosyn. She was on norepinephrine for several days d/t hypotension  attributed to sedation & sepsis-related vasoplegia. It appears that she has been doing fairly long SBTs since 3/26, which seemed to be going reasonably well per RT documentation. Primary team has been having issues w/ progressively increasing FiO2 needs. She had a sputum culture grow Candida & has been on fluconazole since 3/25. She has remained on the insulin drip, presumably to manage the persistent hyperglycemia during her steroid burst (for the presumed COPD exacerbation). She has received a fairly large volume of documented fluids, & was started on diuresis 3/30. She was transferred to [facility] d/t concerns that she will require a tracheostomy secondary to her inability to liberate from the vent.     Preoperative Diagnosis: Perineal and right groin infection     Postoperative Diagnosis: Perineal and right groin necrotizing soft tissue infection     Procedure: Incision and drainage of perineum and right groin     Findings: Infection traveling up the right labia into the right groin.  Final debridement included a right groin defect measuring 18 x 8 x 5 cm and a perineal wound measuring 6 x 2 x 1 cm.       Assessment:      Wound location: Right groin     4/7/25     Last photo: 4/9/25  Wound due to: Surgical Wound   Wound history/plan of care:    Surgical date: 4/2 and 4/8   Service following: General Surgery  Date Negative Pressure Wound Therapy initiated: 04/09/25   Interventions in place: moisture/incontinence management  Is patient s nutritional status compromised? no   If yes, what interventions are in place? N/A  Reason for initiating vac therapy? Presence of co-morbidities, High risk of infections, and Need for accelerated granulation tissue  Which?of?the?following?co-morbidities?apply? Diabetes, Immobility, and Obesity  If diabetic is patient on a diabetic management program? Yes   Is osteomyelitis present in wound? no   If yes what treatments are in place? N/A  Wound due to: Surgical  Wound  Wound history/plan of care: I&D 4/2/25 with second debridement on 4/8/25  Wound base: see photo - mix of adipose and viable red tissue     Palpation of the wound bed: normal      Drainage: moderate     Description of drainage: serosanguinous and tan     Measurements (length x width x depth, in cm): R groin wound measuring 7.5 cm x 17 cm x 4 cm     Tunneling: up to 5 cm at 9 o'clock; up to 7.5 cm between 4 - 5 o'clock     Undermining: from 12-2 o'clock; up to 5cm at deepest part  Periwound skin: Intact      Color: normal and consistent with surrounding tissue      Temperature: normal   Odor: none  Pain: facial expression of distress intermittently during cares; pt offered oral pain meds prior to assessment but does not appear to have taken any per review of MAR - will call prior to next dressing change to offer pain meds again  Pain interventions prior to dressing change: slow and gentle cares  and distraction  Treatment goal: Drainage control and Infection control/prevention  STATUS: improving  Supplies ordered: supplies stored on unit, discussed with RN, and discussed with patient      Number of foam pieces removed from a wound (excluding foam for bridge) : Gauze removed by surgical PA  Verified this matched the number of foam pieces applied last dressing change: N/A   Number of foam pieces packed into wound (excluding foam for bridge) : 2 pieces GranuFoam Black     Pressure Injury Location: Bilateral buttocks      4/1 4/9  Last photo: 4/9  Wound type: Pressure Injury and Friction     Pressure Injury Stage: 2, present on admission   Wound history/plan of care: There was a darker area to the gluteal cleft with some maceration; unclear if it is a developing DTPI but will monitor for changes - 4/9 Skin has sloughed off area over coccyx but remains partial--thickness at this time and measures 2 cm x 0.5 cm    Wound base: 100 % Epidermis B  buttocks with new epithelial tissue and each area is approximately 2.5 cm x 2.5 cm  Periwound skin: Intact      Color: normal and consistent with surrounding tissue      Temperature: normal   Odor: none  Pain: mild, tender  Pain intervention prior to dressing change: slow and gentle cares   Treatment goal: Infection control/prevention, Protection, and Promote epidermal migration  STATUS: healed  Supplies ordered: supplies stored on unit    Treatment Plan:   Wound location: R groin  Change Days: MWF  Supplies: Large foam  Cleanse with: Saline, pat dry.  Suction: Continuous at -125 mmHg pressure.    Back up plan: If VAC malfunctions or unable to maintain seal: VAC dressing must be removed and reapplied within 2 hours of the incident. If floor staff is not able to reapply, place NS moistened gauze in wound bed and cover with appropriate dressing to keep wound bed moist.   Change wet-to-dry dressing BID and notify [facility] staff for reimplementation of VAC therapy when available.  Date canister. Chart canister output every shift.    The hospital VAC pump is not to be discharged with the patient.  Please do one of the following prior to discharge:  If a home VAC pump has been delivered, please apply the home pump to the dressing prior to patient discharge.    If the patient is discharging to LTAC or a TCU/SNF and there is a VAC pump waiting for the patient, close clamp and then disconnect the tubing for transfer, place tubing inside a glove.   If the patient is discharging to home or other facility and there is no VAC pump available for immediate placement, remove the VAC dressing and apply a wet-to-moist gauze dressing for transfer.    R labia - BID  Cleanse with Vashe and place 1 fluff of Vashe moistened gauze into wound bed    Bilateral buttocks wounds: Every 3 days   Cleanse the area with wound cleanser and pat dry.  Apply No sting film barrier to periwound skin.  Cover wound with Sacral Mepilex (#[id number])  Change dressing  Q 3 days.  Turn and reposition Q 2hrs side to side only.  Ensure pt has Pablo-cushion while sitting up in the chair.  If not in ICU: Ensure air pump on bed and set to Isoflex.  FYI- If pt has constant incontinent loose stools needing dressing changes Q shift please discontinue the Mepilex dressing and apply criticaid barrier paste BID and PRN.    Orders: Reviewed and Updated    RECOMMEND PRIMARY TEAM ORDER: None, at this time  Education provided: plan of care, wound progress, and Moisture management  Discussed plan of care with: Patient, Nurse, and Physicians Assistant  Notify [facility] if wound(s) deteriorate.  Nursing to notify the Provider(s) and re-consult the [facility] Nurse if new skin concern.    DATA:     Current support surface: Standard  Standard gel mattress (Isoflex)  Containment of urine/stool: Incontinence Protocol and Indwelling catheter  BMI: Body mass index is 41.35 kg/m .   Active diet order: Orders Placed This Encounter      Easy to Chew Diet (level 7) Thin Liquids (level 0) (NO STRAWS)     Output: I/O last 3 completed shifts:  In: 440 [P.O.:240; I.V.:200]  Out: 3450 [Urine:3450]     Labs:   Recent Labs   Lab 04/09/25  0531   HGB 10.7*   WBC 7.9     Pressure injury risk assessment:   Sensory Perception: 3-->slightly limited  Moisture: 3-->occasionally moist  Activity: 1-->bedfast  Mobility: 3-->slightly limited  Nutrition: 3-->adequate  Friction and Shear: 1-->problem  Vicente Score: 14    Ivette Garcias, MSN RN CWOCN  Pager no longer is use, please contact through [employer] group: [facility]

## 2025-04-09 NOTE — PROGRESS NOTES
Care Management Follow Up    Length of Stay (days): 9    Expected Discharge Date: 04/11/2025    Anticipated Discharge Plan:  LTACH    Transportation: Anticipate medical transport    PT Recommendations: LTACH, pending meets medical criteria  OT Recommendations:  LTACH-pending meets medical criteria     Barriers to Discharge: medical stability    Prior Living Situation: apartment with significant other    Discussed  Partnership in Safe Discharge Planning  document with patient/family: No     Handoff Completed: No, handoff not indicated or clinically appropriate    Patient/Spokesperson Updated: No    Additional Information:  Chart reviewed. Patient is being followed by [facility]. CM will keep them updated     Next Steps: continue to follow     Olga Bui RN

## 2025-04-09 NOTE — PROGRESS NOTES
CLINICAL NUTRITION SERVICES - REASSESSMENT NOTE     RECOMMENDATIONS FOR MDs/PROVIDERS TO ORDER:    Registered Dietitian Interventions:  Add expedite cup with lunch trays and gelatein 20 w/ breakfast trays    Future/Additional Recommendations:  Monitor po intake, wound healing, labs, weight     INFORMATION OBTAINED  Assessed patient in room.    S/p irrigation and debridement wound, right groin and labia on 4/8/2025    CURRENT NUTRITION ORDERS  Diet:  Easy to chew (level7) with thin liquids (level 0), no straws  Snacks/Supplements: Magic Cup daily      CURRENT INTAKE/TOLERANCE  % meals 4/6-4/8: 100% supper 4/8 ( 434 Calories and 19 g protein), 100% breakfast 4/7 ( 424 Calories and 16 g protein, 100% supper and breakfast on 4/6 (670 Calories and 24 g protein): Not all meals recorded.  Pt states her appetite is good.  She is open to getting the expedite cup and the gelatein 20 for wound healing/extra protein     NEW FINDINGS  GI symptoms: Rectal tube placed 4/9  Skin/wounds: right groin wound (surgical wound), bilateral buttocks PI (stage 2)  Nutrition-relevant labs: Cr 1.27 (H), Glu 238 (H)  Nutrition-relevant medications:  Novolog, , lantus pen, Mag-Ox, zosyn, miralax , senna docusate  Weight: 112.7 Kg (248 lb 7.3 oz)    INTERVENTIONS  Add expedite cup with lunch trays ( 45 Calories, 10 g protein and 1 g CHO) and gelatein 20 with breakfast trays ( 90 Calories, 20 g protein and 0g CHO), continue magic cup with lunch trays (290 Calories, 9 g protein and 38-40 g CHO)    GOALS  Blood glucose 140-180 mg/dL  Patient to consume % of nutritionally adequate meal trays TID, or the equivalent with supplements/snacks.  Wound healing per WOC documentation     MONITORING/EVALUATION  Progress toward goals will be monitored and evaluated per policy.

## 2025-04-09 NOTE — PROGRESS NOTES
INFECTIOUS DISEASE FOLLOW UP NOTE  Date: 04/09/2025     ================================================================  SUBJECTIVE  No events    ================================================================  OBJECTIVE  BP (!) 146/64 (BP Location: Left arm)   Pulse 87   Temp 97.7  F (36.5  C) (Oral)   Resp 20   Wt 112.7 kg (248 lb 7.3 oz)   SpO2 96%   BMI 41.35 kg/m      Physical Exam  General: lethargic, no apparent distress  HEENT: atraumatic, normocephalic, no scleral icterus, moist mucus membranes, no cervical lymphadenopathy  Heart: Normal rate, regular rhythm  Lungs: good inspiratory effort  Abdomen: soft, non-tender, non-distended  Extremities: no peripheral edema. S/p surgery of perineum per below    4/7 right groin      Pertinent labs  CBC RESULTS:   Recent Labs   Lab Test 04/03/25  0417   WBC 11.3*   RBC 3.59*   HGB 11.0*   HCT 33.5*   MCV 93   MCH 30.6   MCHC 32.8   RDW 14.4           Imaging  3/22 CTA chest  1.  Very small pulmonary artery emboli to subsegmental branches of the left lower lobe. No right heart strain.  2.  Mild emphysematous changes of the lungs. No focal pulmonary consolidation or pleural effusion.  3.  Trace pericardial effusion.  4.  Small sliding-type hiatal hernia.     3/24 CTA chest  Exam positive for pulmonary embolism. There has been progression of emboli as seen previously. There is now involvement a segmental and subsegmental arteries of the right lower lobe. There is  now atelectasis of the entire right lower lobe with volume loss. There is some abnormal enhancement within portions of the right lower lobe which may be sequela of infarction/ischemia. There is opacification of bronchi within the right lower lobe. Mucous plugging may be present.12     3/24 CT A/P  There is gallbladder distention. A small amount of gallbladder sludge is possible but no calcified gallstones are seen and there is no biliary dilatation. There is diffuse subcutaneous edema. Small right  inguinal hernia containing fat. No loops of bowel are involved.     3/31 CT pelvis  Right inguinal hernia containing fat. Worsening subcutaneous edema and edema in the perineum and labia on the right as compared to previous examination. No discrete abscess is seen.     3/31 CT chest  1.  Complete right and near complete left lower lobe collapse.  2.  Mild groundglass density in the right middle lobe, nonspecific.    4/8 CT A/P  1.  Large surgical defect in the RLQ abdominal wall and right side of the perineum, containing surgical packing material and likely surgical drain. Small residual area of rim-enhancing fluid (suspected abscess) measures roughly 3 x 2 x 3 cm (series 2, image 99).  2.  Large amount of colonic stool, possibly from pain medication.  3.  Gas within the urinary bladder is probably refluxed from the Landrum catheter, but correlate with urinalysis to fully exclude the possibility of a coexisting urinary tract infection.        Microbiology data  3/17 flu/covid/rsv: negative  3/17 blood: NG  3/22 blood: NG  3/24 MRSA nares: negative  3/26 sputum culture: candida, GPCs  3/31 serum galactomannan: negative  3/31 beta-D glucan: normal  3/31 BAL              49 PMNs, 11% PMNs, 30% monocytes, 59% lining cells              Culture: NGTD              AFB culture: NGTD              Histoplasma Ag: negative              Nocardia culture: NGTD              Legionella: negative  4/1 blood: NGTD     I have personally reviewed the relevant laboratory, imaging, and microbiology data  ================================================================  Assessment  Marly Cannon is a 59 year old with perianal abscess.  PMH intellectual disability, COPD w/ severe obstruction, active tobacco use, obesity, ADEEL, secondary polycythemia, DM2, CKD3, HTN.     Recently admitted 3/18-3/20 for DKA + respiratory failure + condyloma acuminata.  Was discharged with insulin pump, 2L oxygen, acyclovir.  Presented again on 3/22 with  chest pain, shortness of breath, found to have DKA, distributive shock, respiratory failure, PE.  She required intubation, and this hospital course was complicated by VAP and perianal abscess s/p I&D with penrose placement on 3/31.  Chest CT on 3/31 showed bilateral lobe collapse.  She has been on pip-tazo since 3/23.  Underwent BAL on 3/31, studies are pending.      For now will continue broad antibiotics to cover both GI/ dre as well as hospital-associated pulmonary pathogens.  Over the last 7 days some of her clinical parameters have improved (downtrending WBC count, now extubated), but still significantly inflamed and with collapse of her lower lung lobes means duration of therapy will need to be extended.  In addition she continued to have feculant drainage from her perianal wound, so was taken back to the OR for I&D on 4/2, then again on 4/8 due to slow healing.  Follow-up CT showed small abscess, overall clinically doing much better.     Impression  # Perianal abscess              - s/p penrose drain placement 3/31              - s/p I&D 4/2  # Condyloma acuminata              - s/p acyclovir in mid-March  # Acute hypoxic respiratory failure  # RML pneumonia, ventilator associated  # Bilateral lower lobe collapse  # Segmental pulmonary embolus  # Severe COPD  # Poorly controlled DM2 (A1C 12)     ================================================================  Plan  - Continue pip-tazo  - Given [id number] abscess on 4/7, anticipate antibiotics through at least 4/17  - ID will follow    Jeremy Cannon MD, MPH  [facility]   Office Telephone [phone].  Fax [phone]  [facility] paging

## 2025-04-09 NOTE — PROGRESS NOTES
[facility]    Medicine Progress Note - Hospitalist Service    Date of Admission:  3/31/2025    Assessment & Plan   59-year-old female patient who was transferred from [facility] on 3/31 for difficulty of weaning off mechanical ventilation.  She is also receiving treatment for perianal abscess. Past medical history significant for hypertension,ckd3,  type 2 diabetes mellitus, intellectual disability, hyperparathyroidism, subsegmental PE, COPD on home O2, untreated ADEEL.     Acute hypoxic respiratory failure due to Ventilator associated pneumonia  Underlying severe COPD  Was extubated on 4/1 after prolonged mechanical ventilation  Patient is now placed back on high flow nasal cannula at 40 L, 40% FiO2.   Continue Zosyn  Taper oxygen as able.   Continue with respiratory hygiene and nebulization therapy   BiPAP at night. She usually refuses  PT/OT, planning to discharge to LTACH    Segmental and subsegmental PE  Was On heparin gtt,  switched to Eliquis 5 mg oral twice daily.  Plan to continue for at least 3 months    Perianal abscess  Condyloma acuminata  completed acyclovir in mid March  s/p I&D with Penrose drain placement on 3/31  had another I and D on 04/02, 04/08  Continue Zosyn at least through 4/17  ID on board  Abdomen pelvis CT on 04/08 : Large surgical defect in the RLQ abdominal wall and right side of the perineum, containing surgical packing material and likely surgical drain. Small residual area of rim-enhancing fluid (suspected abscess) measures roughly 3 x 2 x 3 cm   Taken to the OR for repeat I&D today on 4/8  CRP is trending down  Rectal tube in place    Type 2 diabetes mellitus, poorly controlled  S/p DKA treatment recently  - Hemoglobin A1c is 12.7  - increase Lantus 25 units subcutaneously  - high intensity insulin sliding scale and meal time coverage   - Accu-Checks  - Hypoglycemia protocol    YADIRA on CKD stage III  - Baseline creatinine is around  "1.1-1.3,-->1.56-->1.24  - Monitor    Anemia  - Most likely related to chronic medical illness  - Monitor          Diet: Snacks/Supplements Adult: Magic Cup; With Meals  Easy to Chew Diet (level 7) Thin Liquids (level 0) (NO STRAWS)  Snacks/Supplements Adult: Gelatein 20 (sugar-free); With Meals  Snacks/Supplements Adult: Expedite Cup; With Meals    DVT Prophylaxis: DOAC  Landrum Catheter: PRESENT, indication: Wound deterioration and failed external collection device, Surgical procedure  Lines: PRESENT      CVC Triple Lumen Right Internal jugular-Site Assessment: WDL      Cardiac Monitoring: None  Code Status: Full Code      Clinically Significant Risk Factors          # Hypochloremia: Lowest Cl = 96 mmol/L in last 2 days, will monitor as appropriate      # Hypoalbuminemia: Lowest albumin = 1.7 g/dL at 3/31/2025  5:35 PM, will monitor as appropriate     # Hypertension: Noted on problem list     # Acute Hypoxic Respiratory Failure: Documented O2 saturation < 90%. Continue supplemental oxygen as needed        # DMII: A1C = 12.7 % (Ref range: <5.7 %) within past 6 months   # Severe Obesity: Estimated body mass index is 41.35 kg/m  as calculated from the following:    Height as of 3/22/25: 1.651 m (5' 5\").    Weight as of this encounter: 112.7 kg (248 lb 7.3 oz).      # COPD: noted on problem list        Social Drivers of Health    Tobacco Use: Medium Risk (4/2/2025)    Patient History     Smoking Tobacco Use: Former     Smokeless Tobacco Use: Never     Passive Exposure: Current   Physical Activity: Unknown (5/16/2024)    Exercise Vital Sign     Days of Exercise per Week: 0 days   Social Connections: Unknown (5/16/2024)    Social Connection and Isolation Panel [NHANES]     Frequency of Social Gatherings with Friends and Family: More than three times a week          Disposition Plan     Medically Ready for Discharge: Anticipated in 5+ Days             Noam Schneider MD  Hospitalist Service  [facility]" [facility]  Securely message with [employer] (more info)  Text page via [employer] Paging/Directory   ______________________________________________________________________    Interval History   Chronically sick looking.  No distress noted.  She is on high flow nasal cannula.  She reports having significant pain when her wound area is touched otherwise pain is relatively controlled.  Management plan discussed with the patient and RN.    Physical Exam   Vital Signs: Temp: 97.7  F (36.5  C) Temp src: Oral BP: (!) 146/64 Pulse: 87   Resp: 20 SpO2: 96 % O2 Device: High Flow Nasal Cannula (HFNC) Oxygen Delivery: 35 LPM  Weight: 248 lbs 7.33 oz    General Appearance:  No distress noted  Respiratory: Good air entry bilaterally  Cardiovascular: S1 and S2 heard, no murmur or gallop  GI: Soft abdomen, right lower abdominal tenderness, normoactive bowel sounds  Skin: Intact and warm       Medical Decision Making       40 MINUTES SPENT BY ME on the date of service doing chart review, history, exam, documentation & further activities per the note.      Data

## 2025-04-09 NOTE — PROVIDER NOTIFICATION
4/9 0145: pt has a developing sacral wound in the crack of her butt.   Additionally, she had 2 large loose stools last evening and this morning that saturated her surgical dressing at the right of her labia which then had to be changed.   We then re-inserted the rectal tube per orders to protect the wounds. See picture.

## 2025-04-09 NOTE — TREATMENT PLAN
RCAT Treatment Plan    Patient Score: 11  Patient Acuity: 3    Clinical Indication for Therapy: unable to cough spontaneously, history of mucous producing disease, and prevent atelectasis    Therapy Ordered: Volara TID, albuterol and sodium chloride TID    Assessment Summary: patient transferred here from other facility due to inability to wean from mechanical ventilation. She had hypoxic respiratory failure due to VAP. She is a former tobacco smoker 1 pack/day. CXR 4/5 no new airspace opacities. RR 16, LS clear, NPC, on HFNC 35 LPM 35% SpO2 94%. Discussed with hospitalist. Will try this regimen and continue to monitor. SpO2 ok for 88-92%. Will reassess in 72 hours or as needed.      Federico Hill, RT  4/9/2025

## 2025-04-09 NOTE — PROGRESS NOTES
RCAT Treatment Plan    Patient Score: 10  Patient Acuity: 2    Clinical Indication for Therapy: History of bronchospasm and prevent atelectasis    Therapy Ordered: Duo Q4 3% 8, Pep QID.    Assessment Summary: Patient currently on HFNC 45 L 35% satting 95%.  history COPD, ADEEL ON HOME     ALMA CARLISLE, RT  4/8/2025

## 2025-04-09 NOTE — PROGRESS NOTES
Patient declines BIPAP tonight. Continue HFNC for sleep.     RT will continue to follow.     Maral Gamboa, RT

## 2025-04-09 NOTE — PLAN OF CARE
"  Problem: Adult Inpatient Plan of Care  Goal: Plan of Care Review  Description: The Plan of Care Review/Shift note should be completed every shift.  The Outcome Evaluation is a brief statement about your assessment that the patient is improving, declining, or no change.  This information will be displayed automatically on your shiftnote.  Outcome: Progressing  Goal: Patient-Specific Goal (Individualized)  Description: You can add care plan individualizations to a care plan. Examples of Individualization might be:  \"Parent requests to be called daily at 9am for status\", \"I have a hard time hearing out of my right ear\", or \"Do not touch me to wake me up as it startlesme\".  Outcome: Progressing  Goal: Absence of Hospital-Acquired Illness or Injury  Intervention: Identify and Manage Fall Risk  Recent Flowsheet Documentation  Taken 4/9/2025 1610 by Masha Reyes RN  Safety Promotion/Fall Prevention:   increase visualization of patient   patient and family education  Taken 4/9/2025 1257 by Masha Reyes RN  Safety Promotion/Fall Prevention:   increase visualization of patient   patient and family education  Intervention: Prevent Skin Injury  Recent Flowsheet Documentation  Taken 4/9/2025 1610 by Masha Reyes RN  Body Position: turned  Taken 4/9/2025 1257 by Masha Reyes RN  Body Position: turned  Intervention: Prevent Infection  Recent Flowsheet Documentation  Taken 4/9/2025 1610 by Masha Reyes RN  Infection Prevention: environmental surveillance performed  Goal: Optimal Comfort and Wellbeing  Intervention: Provide Person-Centered Care  Recent Flowsheet Documentation  Taken 4/9/2025 1610 by Masha Reyes RN  Trust Relationship/Rapport:   care explained   choices provided  Taken 4/9/2025 1257 by Masha Reyes RN  Trust Relationship/Rapport:   care explained   choices provided   Goal Outcome Evaluation:  Turned to her sides Some leaking from the rectal tube now starting to drain some " Brownish BM  .Was up at the side of bed with OTPT but could not do much as patient complained that she felt dizzy with activity BP checked at the time and there was no Orthostatic  changes .Talked to family as patient verbalized to them that she wants to get up and walk explained to them what happened during the Therapy session .

## 2025-04-10 ENCOUNTER — APPOINTMENT (OUTPATIENT)
Dept: OCCUPATIONAL THERAPY | Facility: HOSPITAL | Age: 60
DRG: 981 | End: 2025-04-10
Attending: STUDENT IN AN ORGANIZED HEALTH CARE EDUCATION/TRAINING PROGRAM
Payer: COMMERCIAL

## 2025-04-10 ENCOUNTER — APPOINTMENT (OUTPATIENT)
Dept: PHYSICAL THERAPY | Facility: HOSPITAL | Age: 60
DRG: 981 | End: 2025-04-10
Attending: STUDENT IN AN ORGANIZED HEALTH CARE EDUCATION/TRAINING PROGRAM
Payer: COMMERCIAL

## 2025-04-10 VITALS
BODY MASS INDEX: 37.9 KG/M2 | TEMPERATURE: 98.4 F | DIASTOLIC BLOOD PRESSURE: 70 MMHG | WEIGHT: 227.74 LBS | RESPIRATION RATE: 16 BRPM | OXYGEN SATURATION: 95 % | HEART RATE: 90 BPM | SYSTOLIC BLOOD PRESSURE: 151 MMHG

## 2025-04-10 LAB
ANION GAP SERPL CALCULATED.3IONS-SCNC: 6 MMOL/L (ref 7–15)
BACTERIA BRONCH: NORMAL
BUN SERPL-MCNC: 23.2 MG/DL (ref 8–23)
CALCIUM SERPL-MCNC: 9.2 MG/DL (ref 8.8–10.4)
CHLORIDE SERPL-SCNC: 99 MMOL/L (ref 98–107)
CREAT SERPL-MCNC: 1.29 MG/DL (ref 0.51–0.95)
CRP SERPL-MCNC: 20.9 MG/L
EGFRCR SERPLBLD CKD-EPI 2021: 47 ML/MIN/1.73M2
ERYTHROCYTE [DISTWIDTH] IN BLOOD BY AUTOMATED COUNT: 14.1 % (ref 10–15)
GLUCOSE BLDC GLUCOMTR-MCNC: 115 MG/DL (ref 70–99)
GLUCOSE BLDC GLUCOMTR-MCNC: 151 MG/DL (ref 70–99)
GLUCOSE BLDC GLUCOMTR-MCNC: 151 MG/DL (ref 70–99)
GLUCOSE BLDC GLUCOMTR-MCNC: 178 MG/DL (ref 70–99)
GLUCOSE SERPL-MCNC: 152 MG/DL (ref 70–99)
HCO3 SERPL-SCNC: 35 MMOL/L (ref 22–29)
HCT VFR BLD AUTO: 33.6 % (ref 35–47)
HGB BLD-MCNC: 10.4 G/DL (ref 11.7–15.7)
MAGNESIUM SERPL-MCNC: 1.6 MG/DL (ref 1.7–2.3)
MCH RBC QN AUTO: 29.5 PG (ref 26.5–33)
MCHC RBC AUTO-ENTMCNC: 31 G/DL (ref 31.5–36.5)
MCV RBC AUTO: 95 FL (ref 78–100)
PHOSPHATE SERPL-MCNC: 3 MG/DL (ref 2.5–4.5)
PLATELET # BLD AUTO: 266 10E3/UL (ref 150–450)
POTASSIUM SERPL-SCNC: 4.4 MMOL/L (ref 3.4–5.3)
RBC # BLD AUTO: 3.53 10E6/UL (ref 3.8–5.2)
SODIUM SERPL-SCNC: 140 MMOL/L (ref 135–145)
WBC # BLD AUTO: 7.2 10E3/UL (ref 4–11)

## 2025-04-10 PROCEDURE — 82310 ASSAY OF CALCIUM: CPT

## 2025-04-10 PROCEDURE — 94660 CPAP INITIATION&MGMT: CPT

## 2025-04-10 PROCEDURE — 97530 THERAPEUTIC ACTIVITIES: CPT | Mod: GP

## 2025-04-10 PROCEDURE — 84100 ASSAY OF PHOSPHORUS: CPT | Performed by: STUDENT IN AN ORGANIZED HEALTH CARE EDUCATION/TRAINING PROGRAM

## 2025-04-10 PROCEDURE — 250N000009 HC RX 250: Performed by: STUDENT IN AN ORGANIZED HEALTH CARE EDUCATION/TRAINING PROGRAM

## 2025-04-10 PROCEDURE — 94640 AIRWAY INHALATION TREATMENT: CPT | Mod: 76

## 2025-04-10 PROCEDURE — 250N000013 HC RX MED GY IP 250 OP 250 PS 637

## 2025-04-10 PROCEDURE — 97535 SELF CARE MNGMENT TRAINING: CPT | Mod: GO

## 2025-04-10 PROCEDURE — 99232 SBSQ HOSP IP/OBS MODERATE 35: CPT | Performed by: INTERNAL MEDICINE

## 2025-04-10 PROCEDURE — 250N000013 HC RX MED GY IP 250 OP 250 PS 637: Performed by: HOSPITALIST

## 2025-04-10 PROCEDURE — 99232 SBSQ HOSP IP/OBS MODERATE 35: CPT | Mod: 24

## 2025-04-10 PROCEDURE — 86140 C-REACTIVE PROTEIN: CPT | Performed by: INTERNAL MEDICINE

## 2025-04-10 PROCEDURE — 250N000011 HC RX IP 250 OP 636

## 2025-04-10 PROCEDURE — 94640 AIRWAY INHALATION TREATMENT: CPT

## 2025-04-10 PROCEDURE — 99233 SBSQ HOSP IP/OBS HIGH 50: CPT | Performed by: HOSPITALIST

## 2025-04-10 PROCEDURE — 120N000004 HC R&B MS OVERFLOW

## 2025-04-10 PROCEDURE — 94799 UNLISTED PULMONARY SVC/PX: CPT

## 2025-04-10 PROCEDURE — 83735 ASSAY OF MAGNESIUM: CPT

## 2025-04-10 PROCEDURE — 85041 AUTOMATED RBC COUNT: CPT

## 2025-04-10 PROCEDURE — 250N000013 HC RX MED GY IP 250 OP 250 PS 637: Performed by: STUDENT IN AN ORGANIZED HEALTH CARE EDUCATION/TRAINING PROGRAM

## 2025-04-10 PROCEDURE — 999N000157 HC STATISTIC RCP TIME EA 10 MIN

## 2025-04-10 RX ORDER — MAGNESIUM OXIDE 400 MG/1
400 TABLET ORAL EVERY 4 HOURS
Status: COMPLETED | OUTPATIENT
Start: 2025-04-10 | End: 2025-04-10

## 2025-04-10 RX ADMIN — PIPERACILLIN AND TAZOBACTAM 3.38 G: 3; .375 INJECTION, POWDER, FOR SOLUTION INTRAVENOUS at 13:27

## 2025-04-10 RX ADMIN — ACETAMINOPHEN 975 MG: 325 TABLET ORAL at 21:07

## 2025-04-10 RX ADMIN — PIPERACILLIN AND TAZOBACTAM 3.38 G: 3; .375 INJECTION, POWDER, FOR SOLUTION INTRAVENOUS at 05:09

## 2025-04-10 RX ADMIN — APIXABAN 10 MG: 5 TABLET, FILM COATED ORAL at 08:47

## 2025-04-10 RX ADMIN — AMLODIPINE BESYLATE 5 MG: 5 TABLET ORAL at 08:47

## 2025-04-10 RX ADMIN — ATORVASTATIN CALCIUM 40 MG: 40 TABLET, FILM COATED ORAL at 21:08

## 2025-04-10 RX ADMIN — POLYETHYLENE GLYCOL 3350 17 G: 17 POWDER, FOR SOLUTION ORAL at 10:05

## 2025-04-10 RX ADMIN — SENNOSIDES AND DOCUSATE SODIUM 1 TABLET: 50; 8.6 TABLET ORAL at 21:08

## 2025-04-10 RX ADMIN — ALBUTEROL SULFATE 2.5 MG: 2.5 SOLUTION RESPIRATORY (INHALATION) at 20:20

## 2025-04-10 RX ADMIN — SODIUM CHLORIDE SOLN NEBU 3% 3 ML: 3 NEBU SOLN at 07:26

## 2025-04-10 RX ADMIN — PIPERACILLIN AND TAZOBACTAM 3.38 G: 3; .375 INJECTION, POWDER, FOR SOLUTION INTRAVENOUS at 21:12

## 2025-04-10 RX ADMIN — MAGNESIUM OXIDE TAB 400 MG (241.3 MG ELEMENTAL MG) 400 MG: 400 (241.3 MG) TAB at 08:47

## 2025-04-10 RX ADMIN — MAGNESIUM OXIDE TAB 400 MG (241.3 MG ELEMENTAL MG) 400 MG: 400 (241.3 MG) TAB at 13:27

## 2025-04-10 RX ADMIN — ACETAMINOPHEN 975 MG: 325 TABLET ORAL at 13:27

## 2025-04-10 RX ADMIN — APIXABAN 10 MG: 5 TABLET, FILM COATED ORAL at 21:08

## 2025-04-10 RX ADMIN — UMECLIDINIUM BROMIDE AND VILANTEROL TRIFENATATE 1 PUFF: 62.5; 25 POWDER RESPIRATORY (INHALATION) at 08:48

## 2025-04-10 RX ADMIN — SODIUM CHLORIDE SOLN NEBU 3% 3 ML: 3 NEBU SOLN at 20:21

## 2025-04-10 RX ADMIN — ACETAMINOPHEN 975 MG: 325 TABLET ORAL at 05:09

## 2025-04-10 RX ADMIN — SENNOSIDES AND DOCUSATE SODIUM 1 TABLET: 50; 8.6 TABLET ORAL at 10:05

## 2025-04-10 RX ADMIN — ALBUTEROL SULFATE 2.5 MG: 2.5 SOLUTION RESPIRATORY (INHALATION) at 07:26

## 2025-04-10 RX ADMIN — ALBUTEROL SULFATE 2.5 MG: 2.5 SOLUTION RESPIRATORY (INHALATION) at 14:27

## 2025-04-10 ASSESSMENT — ACTIVITIES OF DAILY LIVING (ADL)
ADLS_ACUITY_SCORE: 62
ADLS_ACUITY_SCORE: 62
ADLS_ACUITY_SCORE: 67
ADLS_ACUITY_SCORE: 62
ADLS_ACUITY_SCORE: 62
ADLS_ACUITY_SCORE: 67
ADLS_ACUITY_SCORE: 62
ADLS_ACUITY_SCORE: 67
ADLS_ACUITY_SCORE: 62
ADLS_ACUITY_SCORE: 67
ADLS_ACUITY_SCORE: 67
ADLS_ACUITY_SCORE: 62
ADLS_ACUITY_SCORE: 67
ADLS_ACUITY_SCORE: 62

## 2025-04-10 NOTE — PROGRESS NOTES
Care Management Follow Up    Length of Stay (days): 10    Expected Discharge Date: 04/14/2025    Anticipated Discharge Plan:  LTACH    Transportation: TBD    PT Recommendations: LTACH, pending meets medical criteria  OT Recommendations:  LTACH-pending meets medical criteria     Barriers to Discharge: medical stability    Prior Living Situation: apartment with significant other    Discussed  Partnership in Safe Discharge Planning  document with patient/family: No     Handoff Completed: No, handoff not indicated or clinically appropriate    Patient/Spokesperson Updated: No    Additional Information:  Chart reviewed. Message sent via teams to Sera at [facility] that per MD note anticipate discharge in 2-4 days. She will start auth tomorrow     2:25 PM  Spoke to Sera . She says they could take patient tomorrow if medically ready. Message sent to Dr Del Valle who responded that patient could be ready tomorrow     2:26 PM  Met with patient. She is agreeable to [facility]. Says ok to call Tavo and Mary.     Called Tavo and updated him. He wants to be kept updated     Called Mary, no answer, did not leave voicemail     Sera requests transport tomorrow around 1230. Stretcher arranged [phone]        Next Steps: continue to follow     Olga Bui RN

## 2025-04-10 NOTE — PLAN OF CARE
Goal Outcome Evaluation:      Plan of Care Reviewed With: patient    Overall Patient Progress: improvingOverall Patient Progress: improving    Outcome Evaluation: Pt weaned to 6L per nasal cannula.  Pt being turned q2 hours.  Pt tolerated right labia packing change.  Pt with controlled pain, with no pain meds.  Pt with rectal tube, noguera and RIJ.

## 2025-04-10 NOTE — PROGRESS NOTES
SPIRITUAL HEALTH SERVICES NOTE  [facility]; P3    Reason for Visit: LOS    SPIRITUAL ASSESSMENT  Reports support from her Denominational community  Denies any concerns at this time    Patient/Family Understanding of Illness and Goals of Care - Saw Marly due to LOS. She appeared drowsy and engaged minimally in conversation.     Distress and Loss - Not discussed during this visit    Strengths, Coping, and Resources - When asked, Marly states that she has family/friends supporting her.     Meaning, Beliefs, and Spirituality - Marly states that her Denominational is aware of her admission. She did not identify any needs or concerns at this time. Offered prayer and devotional. I encouraged her to let nursing staff know if she desires further support from Spiritual Care.     Plan of Care: Will remain available for further support as requested by patient/family/staff    Tamara Jackson M.Div.    Office: [phone] (for non-urgent requests)  Please Vocera or page through [employer] for time-sensitive requests

## 2025-04-10 NOTE — PROGRESS NOTES
INFECTIOUS DISEASE FOLLOW UP NOTE  Date: 04/10/2025     ================================================================  SUBJECTIVE  No events    ================================================================  OBJECTIVE  BP (!) 151/70 (BP Location: Left arm)   Pulse 87   Temp 98.2  F (36.8  C) (Oral)   Resp 24   Wt 103.3 kg (227 lb 11.8 oz)   SpO2 94%   BMI 37.90 kg/m      Physical Exam  General: lethargic, no apparent distress  HEENT: atraumatic, normocephalic, no scleral icterus, moist mucus membranes, no cervical lymphadenopathy  Heart: Normal rate, regular rhythm  Lungs: good inspiratory effort  Abdomen: soft, non-tender, non-distended  Extremities: no peripheral edema. S/p surgery of perineum per below    4/7 right groin      Pertinent labs  CBC RESULTS:   Recent Labs   Lab Test 04/03/25  0417   WBC 11.3*   RBC 3.59*   HGB 11.0*   HCT 33.5*   MCV 93   MCH 30.6   MCHC 32.8   RDW 14.4           Imaging  3/22 CTA chest  1.  Very small pulmonary artery emboli to subsegmental branches of the left lower lobe. No right heart strain.  2.  Mild emphysematous changes of the lungs. No focal pulmonary consolidation or pleural effusion.  3.  Trace pericardial effusion.  4.  Small sliding-type hiatal hernia.     3/24 CTA chest  Exam positive for pulmonary embolism. There has been progression of emboli as seen previously. There is now involvement a segmental and subsegmental arteries of the right lower lobe. There is  now atelectasis of the entire right lower lobe with volume loss. There is some abnormal enhancement within portions of the right lower lobe which may be sequela of infarction/ischemia. There is opacification of bronchi within the right lower lobe. Mucous plugging may be present.12     3/24 CT A/P  There is gallbladder distention. A small amount of gallbladder sludge is possible but no calcified gallstones are seen and there is no biliary dilatation. There is diffuse subcutaneous edema. Small  right inguinal hernia containing fat. No loops of bowel are involved.     3/31 CT pelvis  Right inguinal hernia containing fat. Worsening subcutaneous edema and edema in the perineum and labia on the right as compared to previous examination. No discrete abscess is seen.     3/31 CT chest  1.  Complete right and near complete left lower lobe collapse.  2.  Mild groundglass density in the right middle lobe, nonspecific.    4/8 CT A/P  1.  Large surgical defect in the RLQ abdominal wall and right side of the perineum, containing surgical packing material and likely surgical drain. Small residual area of rim-enhancing fluid (suspected abscess) measures roughly 3 x 2 x 3 cm (series 2, image 99).  2.  Large amount of colonic stool, possibly from pain medication.  3.  Gas within the urinary bladder is probably refluxed from the Landrum catheter, but correlate with urinalysis to fully exclude the possibility of a coexisting urinary tract infection.        Microbiology data  3/17 flu/covid/rsv: negative  3/17 blood: NG  3/22 blood: NG  3/24 MRSA nares: negative  3/26 sputum culture: candida, GPCs  3/31 serum galactomannan: negative  3/31 beta-D glucan: normal  3/31 BAL              49 PMNs, 11% PMNs, 30% monocytes, 59% lining cells              Culture: NGTD              AFB culture: NGTD              Histoplasma Ag: negative              Nocardia culture: NGTD              Legionella: negative  4/1 blood: NGTD     I have personally reviewed the relevant laboratory, imaging, and microbiology data  ================================================================  Assessment  Marly Cannon is a 59 year old with perianal abscess.  PMH intellectual disability, COPD w/ severe obstruction, active tobacco use, obesity, ADEEL, secondary polycythemia, DM2, CKD3, HTN.     Recently admitted 3/18-3/20 for DKA + respiratory failure + condyloma acuminata.  Was discharged with insulin pump, 2L oxygen, acyclovir.  Presented again on 3/22  with chest pain, shortness of breath, found to have DKA, distributive shock, respiratory failure, PE.  She required intubation, and this hospital course was complicated by VAP and perianal abscess s/p I&D with penrose placement on 3/31.  Chest CT on 3/31 showed bilateral lobe collapse.  She has been on pip-tazo since 3/23.  Underwent BAL on 3/31, studies are pending.      For now will continue broad antibiotics to cover both GI/ dre as well as hospital-associated pulmonary pathogens.  Over the last 7 days some of her clinical parameters have improved (downtrending WBC count, now extubated), but still significantly inflamed and with collapse of her lower lung lobes means duration of therapy will need to be extended.  In addition she continued to have feculant drainage from her perianal wound, so was taken back to the OR for I&D on 4/2, then again on 4/8 due to slow healing.  Follow-up CT showed small abscess, overall clinically doing much better.     Impression  # Perianal abscess              - s/p penrose drain placement 3/31              - s/p I&D 4/2  # Condyloma acuminata              - s/p acyclovir in mid-March  # Acute hypoxic respiratory failure  # RML pneumonia, ventilator associated  # Bilateral lower lobe collapse  # Segmental pulmonary embolus  # Severe COPD  # Poorly controlled DM2 (A1C 12)     ================================================================  Plan  - Continue pip-tazo through 4/17  - ID will sign off    Jeremy Cannon MD, MPH  [facility]   Office Telephone [phone].  Fax [phone]  [facility] paging

## 2025-04-10 NOTE — PLAN OF CARE
Problem: Skin Injury Risk Increased  Goal: Skin Health and Integrity  Outcome: Progressing  Intervention: Plan: Nurse Driven Intervention: Moisture Management  Recent Flowsheet Documentation  Taken 4/9/2025 2324 by Olga Foster RN  Moisture Interventions:   Urinary collection device   Fecal collection device   Incontinence pad  Taken 4/9/2025 2045 by Olga Foster RN  Moisture Interventions:   Urinary collection device   Fecal collection device   Incontinence pad  Bathing/Skin Care: incontinence care  Intervention: Plan: Nurse Driven Intervention: Friction and Shear  Recent Flowsheet Documentation  Taken 4/9/2025 2324 by Olga Foster RN  Friction/Shear Interventions: HOB 30 degrees or less  Taken 4/9/2025 2045 by Olga Foster RN  Friction/Shear Interventions: HOB 30 degrees or less  Intervention: Optimize Skin Protection  Recent Flowsheet Documentation  Taken 4/10/2025 0120 by Olga Foster RN  Head of Bed (HOB) Positioning: HOB at 20-30 degrees  Taken 4/9/2025 2324 by Olga Foster RN  Head of Bed (HOB) Positioning: HOB at 20-30 degrees  Taken 4/9/2025 2045 by Olga Foster RN  Head of Bed (HOB) Positioning: HOB at 20 degrees     Problem: Infection  Goal: Absence of Infection Signs and Symptoms  Outcome: Progressing   Goal Outcome Evaluation:       A&Ox3. Was on HFNC, but switched to oxymask this AM. Currently on 8L. Pt rested well overnight. Reporting minimal pain, unless cleaning perineal area. Wound vac to R groin, no output. Vashe soaked gauze to R reji/labia area, minimal bright red output. Landrum cath and rectal tube in place, stool bypassing rectal tube at times. Mepilex covering pressure sores on buttocks. Turned and repo'd. Meds crushed in applesauce. K, Mg, P protocols ran - replace Mg and rechecks in AM.

## 2025-04-10 NOTE — PROGRESS NOTES
[facility]    Medicine Progress Note - Hospitalist Service    Date of Admission:  3/31/2025    Assessment & Plan   59-year-old female with severe COPD, [facility], respiratory failure, ventilator associated pneumonia and perianal abscess.    #Acute hypoxic respiratory failure  #Ventilator associated pneumonia  #Severe COPD  #ADEEL  S/p extubation 4/1 after prolonged mechanical ventilation  HFNC O2 weaned to NC, wean as tolerated with BiPAP at night  Still on Zosyn  PTA inhalers    #Acute PE  Segmental and subsegmental  S/p heparin drip transition to Eliquis    #Perineal abscess, R groin  #Condyloma acuminata   Completed acyclovir course in mid March  S/p I&D with Penrose drain 3/31  Repeat I&D 4/2, 4/8  Zosyn per ID through 4/17  Rectal tube  GenSurg following and planning for vac exchange 4/11    #Diabetes type 2, poorly controlled  A1c 12.7  Lantus, carb count 1: 5 with meals, SSI    #YADIRA on CKD stage III  Cr peaked ~2  Stable at 1.2-1.3  Monitor    #Acute blood loss anemia on anemia of chronic disease  Hgb stable in 10 range  Monitor      VTE ppx: DOAC        Diet: Snacks/Supplements Adult: Magic Cup; With Meals  Easy to Chew Diet (level 7) Thin Liquids (level 0) (NO STRAWS)  Snacks/Supplements Adult: Gelatein 20 (sugar-free); With Meals  Snacks/Supplements Adult: Expedite Cup; With Meals    Landrum Catheter: PRESENT, indication: Wound deterioration and failed external collection device  Lines: PRESENT      CVC Triple Lumen Right Internal jugular-Site Assessment: WDL      Cardiac Monitoring: None  Code Status: Full Code      Clinically Significant Risk Factors          # Hypochloremia: Lowest Cl = 96 mmol/L in last 2 days, will monitor as appropriate    # Hypomagnesemia: Lowest Mg = 1.6 mg/dL in last 2 days, will replace as needed   # Hypoalbuminemia: Lowest albumin = 1.7 g/dL at 3/31/2025  5:35 PM, will monitor as appropriate     # Hypertension: Noted on  "problem list     # Acute Hypoxic Respiratory Failure: Documented O2 saturation < 90%. Continue supplemental oxygen as needed        # DMII: A1C = 12.7 % (Ref range: <5.7 %) within past 6 months   # Obesity: Estimated body mass index is 37.9 kg/m  as calculated from the following:    Height as of 3/22/25: 1.651 m (5' 5\").    Weight as of this encounter: 103.3 kg (227 lb 11.8 oz).      # COPD: noted on problem list        Social Drivers of Health    Tobacco Use: Medium Risk (4/2/2025)    Patient History     Smoking Tobacco Use: Former     Smokeless Tobacco Use: Never     Passive Exposure: Current   Physical Activity: Unknown (5/16/2024)    Exercise Vital Sign     Days of Exercise per Week: 0 days   Social Connections: Unknown (5/16/2024)    Social Connection and Isolation Panel [NHANES]     Frequency of Social Gatherings with Friends and Family: More than three times a week          Disposition Plan     Medically Ready for Discharge: Anticipated Tomorrow  Planning for LTACH           Jair Del Valle DO  Hospitalist Service  [facility]  Securely message with [employer]) (more info)  Text page via [employer] Paging/Directory   ______________________________________________________________________    Interval History   No acute events overnight    Physical Exam   Vital Signs: Temp: 98.2  F (36.8  C) Temp src: Oral BP: (!) 151/70 Pulse: 87   Resp: 24 SpO2: 94 % O2 Device: Oxymask Oxygen Delivery: 6 LPM  Weight: 227 lbs 11.76 oz    General Appearance:  No acute distress  Respiratory: Clear to auscultation bilaterally  Cardiovascular: Regular rate and rhythm  GI: Normal bowel sounds, abdomen is soft with no rebound  Neuro: Alert and oriented x 3, normal speech      Medical Decision Making       50 MINUTES SPENT BY ME on the date of service doing chart review, history, exam, documentation & further activities per the note.      Data     "

## 2025-04-10 NOTE — PROGRESS NOTES
General Surgery Progress Note:    Hospital Day # 10    ASSESSMENT:  1. Pressure injury of buttock, stage 2, unspecified laterality (H) [L89.302]    2. Perineal abscess    3. Non-healing surgical wound of right groin, initial encounter [T81.89XA]        Marly Cannon is a 59 year old female PMH COPD on home O2, DM2 12.7% 3/22, recently admitted for DKA and acute hypoxic respiratory failure with LL lobe PE, complicated by VAP, chronic decubitus ulcer s/p bedside I&D and Penrose placement 3/31 s/p incision and drainage of NSTI 4/2 with Penrose placements and takeback 4/8 for further washout and replacement of Penrose drain. Wound vac placed 4/9 on right groin wound and through labial tunnel, packing posterior labial wound.    PLAN:  -Diet as tolerated  -Will plan to evaluate wound tomorrow with WOC with vac exchange  -Continue BID vashe moistened packing into right lower labial wound (leave vac dressings and penrose in place)  -Medical management per primary team    SUBJECTIVE:   Marly Cannon was seen on rounds. States she is tolerating the wound vac and denies any increase in pain. Tolerating food well and appetite is good. No new changes. No new chest congestion or fevers or chills.     VITALS RANGE:  Temp:  [98.2  F (36.8  C)-98.6  F (37  C)] 98.2  F (36.8  C)  Pulse:  [82-94] 87  Resp:  [18-24] 24  BP: (121-151)/(63-71) 151/70  FiO2 (%):  [35 %] 35 %  SpO2:  [69 %-97 %] 94 %    PHYSICAL EXAM:  General: patient seen resting in bed in no acute distress  Resp: no increased work of breathing, breathing comfortably on room air  Abdomen: generally soft nondistended  Drains: wound vac is holding to suction and canister is empty. Appears clean, dry, and intact new dressing / clean dressing is in place in the posterior labia. Posterior labial wound appears clean and with granulation tissue near vaginal opening. Does appear relatively free of debris and stool and rectal tubing remains in place   Output by Drain (mL)  04/08/25 0700 - 04/08/25 1459 04/08/25 1500 - 04/08/25 2259 04/08/25 2300 - 04/09/25 0659 04/09/25 0700 - 04/09/25 1459 04/09/25 1500 - 04/09/25 2259 04/09/25 2300 - 04/10/25 0659 04/10/25 0700 - 04/10/25 1059   Negative Pressure Wound Therapy Other (Comment) Anterior;Right    0         Extremities: No edema or cyanosis visualized on exam, no obvious deformities    04/09 0700 - 04/10 0659  In: 627 [P.O.:627]  Out: 1500 [Urine:1500]    No results displayed because visit has over 200 results.           ANNETTE Dudley [facility] General Surgery  [address] [place] [id number]  [facility] [phone]

## 2025-04-11 ENCOUNTER — HOSPITAL ENCOUNTER (INPATIENT)
Facility: CLINIC | Age: 60
DRG: 602 | End: 2025-04-11
Attending: INTERNAL MEDICINE | Admitting: STUDENT IN AN ORGANIZED HEALTH CARE EDUCATION/TRAINING PROGRAM
Payer: COMMERCIAL

## 2025-04-11 VITALS
RESPIRATION RATE: 18 BRPM | OXYGEN SATURATION: 96 % | DIASTOLIC BLOOD PRESSURE: 68 MMHG | SYSTOLIC BLOOD PRESSURE: 129 MMHG | WEIGHT: 223.33 LBS | HEART RATE: 88 BPM | BODY MASS INDEX: 37.16 KG/M2 | TEMPERATURE: 98.4 F

## 2025-04-11 DIAGNOSIS — J96.01 ACUTE RESPIRATORY FAILURE WITH HYPOXIA (H): ICD-10-CM

## 2025-04-11 DIAGNOSIS — T14.8XXA OPEN WOUND: Primary | ICD-10-CM

## 2025-04-11 DIAGNOSIS — Z91.89 AT HIGH RISK FOR IMPAIRED SKIN INTEGRITY: ICD-10-CM

## 2025-04-11 LAB
ANION GAP SERPL CALCULATED.3IONS-SCNC: 8 MMOL/L (ref 7–15)
BASOPHILS # BLD AUTO: 0.1 10E3/UL (ref 0–0.2)
BASOPHILS NFR BLD AUTO: 1 %
BUN SERPL-MCNC: 28 MG/DL (ref 8–23)
CALCIUM SERPL-MCNC: 9.5 MG/DL (ref 8.8–10.4)
CHLORIDE SERPL-SCNC: 100 MMOL/L (ref 98–107)
CREAT SERPL-MCNC: 1.18 MG/DL (ref 0.51–0.95)
EGFRCR SERPLBLD CKD-EPI 2021: 52 ML/MIN/1.73M2
EOSINOPHIL # BLD AUTO: 0.3 10E3/UL (ref 0–0.7)
EOSINOPHIL NFR BLD AUTO: 5 %
ERYTHROCYTE [DISTWIDTH] IN BLOOD BY AUTOMATED COUNT: 13.8 % (ref 10–15)
GLUCOSE BLDC GLUCOMTR-MCNC: 111 MG/DL (ref 70–99)
GLUCOSE BLDC GLUCOMTR-MCNC: 136 MG/DL (ref 70–99)
GLUCOSE BLDC GLUCOMTR-MCNC: 188 MG/DL (ref 70–99)
GLUCOSE BLDC GLUCOMTR-MCNC: 192 MG/DL (ref 70–99)
GLUCOSE SERPL-MCNC: 181 MG/DL (ref 70–99)
HCO3 SERPL-SCNC: 32 MMOL/L (ref 22–29)
HCT VFR BLD AUTO: 33.9 % (ref 35–47)
HGB BLD-MCNC: 10.7 G/DL (ref 11.7–15.7)
IMM GRANULOCYTES # BLD: 0 10E3/UL
IMM GRANULOCYTES NFR BLD: 1 %
LYMPHOCYTES # BLD AUTO: 1 10E3/UL (ref 0.8–5.3)
LYMPHOCYTES NFR BLD AUTO: 16 %
MCH RBC QN AUTO: 29.9 PG (ref 26.5–33)
MCHC RBC AUTO-ENTMCNC: 31.6 G/DL (ref 31.5–36.5)
MCV RBC AUTO: 95 FL (ref 78–100)
MONOCYTES # BLD AUTO: 0.9 10E3/UL (ref 0–1.3)
MONOCYTES NFR BLD AUTO: 14 %
NEUTROPHILS # BLD AUTO: 4 10E3/UL (ref 1.6–8.3)
NEUTROPHILS NFR BLD AUTO: 63 %
NRBC # BLD AUTO: 0 10E3/UL
NRBC BLD AUTO-RTO: 0 /100
PLATELET # BLD AUTO: 244 10E3/UL (ref 150–450)
POTASSIUM SERPL-SCNC: 4.7 MMOL/L (ref 3.4–5.3)
RBC # BLD AUTO: 3.58 10E6/UL (ref 3.8–5.2)
SODIUM SERPL-SCNC: 140 MMOL/L (ref 135–145)
WBC # BLD AUTO: 6.3 10E3/UL (ref 4–11)

## 2025-04-11 PROCEDURE — 99223 1ST HOSP IP/OBS HIGH 75: CPT | Performed by: STUDENT IN AN ORGANIZED HEALTH CARE EDUCATION/TRAINING PROGRAM

## 2025-04-11 PROCEDURE — 80048 BASIC METABOLIC PNL TOTAL CA: CPT | Performed by: HOSPITALIST

## 2025-04-11 PROCEDURE — G0463 HOSPITAL OUTPT CLINIC VISIT: HCPCS | Mod: 25

## 2025-04-11 PROCEDURE — 99418 PROLNG IP/OBS E/M EA 15 MIN: CPT | Performed by: STUDENT IN AN ORGANIZED HEALTH CARE EDUCATION/TRAINING PROGRAM

## 2025-04-11 PROCEDURE — 120N000017 HC R&B RESPIRATORY CARE

## 2025-04-11 PROCEDURE — 250N000013 HC RX MED GY IP 250 OP 250 PS 637

## 2025-04-11 PROCEDURE — 250N000011 HC RX IP 250 OP 636

## 2025-04-11 PROCEDURE — 250N000013 HC RX MED GY IP 250 OP 250 PS 637: Performed by: STUDENT IN AN ORGANIZED HEALTH CARE EDUCATION/TRAINING PROGRAM

## 2025-04-11 PROCEDURE — 999N000157 HC STATISTIC RCP TIME EA 10 MIN

## 2025-04-11 PROCEDURE — 94640 AIRWAY INHALATION TREATMENT: CPT

## 2025-04-11 PROCEDURE — 99239 HOSP IP/OBS DSCHRG MGMT >30: CPT | Performed by: HOSPITALIST

## 2025-04-11 PROCEDURE — 250N000009 HC RX 250: Performed by: STUDENT IN AN ORGANIZED HEALTH CARE EDUCATION/TRAINING PROGRAM

## 2025-04-11 PROCEDURE — 250N000011 HC RX IP 250 OP 636: Performed by: STUDENT IN AN ORGANIZED HEALTH CARE EDUCATION/TRAINING PROGRAM

## 2025-04-11 PROCEDURE — 94660 CPAP INITIATION&MGMT: CPT

## 2025-04-11 PROCEDURE — 99232 SBSQ HOSP IP/OBS MODERATE 35: CPT | Mod: 24

## 2025-04-11 PROCEDURE — 250N000012 HC RX MED GY IP 250 OP 636 PS 637: Performed by: STUDENT IN AN ORGANIZED HEALTH CARE EDUCATION/TRAINING PROGRAM

## 2025-04-11 PROCEDURE — 85025 COMPLETE CBC W/AUTO DIFF WBC: CPT | Performed by: HOSPITALIST

## 2025-04-11 PROCEDURE — 97606 NEG PRS WND THER DME>50 SQCM: CPT

## 2025-04-11 RX ORDER — OXYCODONE HYDROCHLORIDE 5 MG/1
5 TABLET ORAL EVERY 4 HOURS PRN
Status: CANCELLED | OUTPATIENT
Start: 2025-04-11

## 2025-04-11 RX ORDER — PIPERACILLIN SODIUM, TAZOBACTAM SODIUM 3; .375 G/15ML; G/15ML
3.38 INJECTION, POWDER, LYOPHILIZED, FOR SOLUTION INTRAVENOUS EVERY 8 HOURS SCHEDULED
Status: COMPLETED | OUTPATIENT
Start: 2025-04-11 | End: 2025-04-17

## 2025-04-11 RX ORDER — ATORVASTATIN CALCIUM 40 MG/1
40 TABLET, FILM COATED ORAL AT BEDTIME
Status: DISCONTINUED | OUTPATIENT
Start: 2025-04-11 | End: 2025-05-09 | Stop reason: HOSPADM

## 2025-04-11 RX ORDER — SENNOSIDES 8.6 MG
8.6 TABLET ORAL 2 TIMES DAILY PRN
Status: DISCONTINUED | OUTPATIENT
Start: 2025-04-11 | End: 2025-05-09 | Stop reason: HOSPADM

## 2025-04-11 RX ORDER — ALBUTEROL SULFATE 0.83 MG/ML
2.5 SOLUTION RESPIRATORY (INHALATION)
Status: DISCONTINUED | OUTPATIENT
Start: 2025-04-11 | End: 2025-05-09 | Stop reason: HOSPADM

## 2025-04-11 RX ORDER — OXYCODONE HYDROCHLORIDE 5 MG/1
10 TABLET ORAL EVERY 4 HOURS PRN
Status: CANCELLED | OUTPATIENT
Start: 2025-04-11

## 2025-04-11 RX ORDER — PROCHLORPERAZINE MALEATE 5 MG/1
10 TABLET ORAL EVERY 6 HOURS PRN
Status: CANCELLED | OUTPATIENT
Start: 2025-04-11

## 2025-04-11 RX ORDER — SODIUM CHLORIDE FOR INHALATION 3 %
3 VIAL, NEBULIZER (ML) INHALATION 2 TIMES DAILY
Status: CANCELLED | OUTPATIENT
Start: 2025-04-11

## 2025-04-11 RX ORDER — NICOTINE POLACRILEX 4 MG
15-30 LOZENGE BUCCAL
Status: CANCELLED | OUTPATIENT
Start: 2025-04-11

## 2025-04-11 RX ORDER — NALOXONE HYDROCHLORIDE 0.4 MG/ML
0.4 INJECTION, SOLUTION INTRAMUSCULAR; INTRAVENOUS; SUBCUTANEOUS
Status: CANCELLED | OUTPATIENT
Start: 2025-04-11

## 2025-04-11 RX ORDER — ALBUTEROL SULFATE 0.83 MG/ML
2.5 SOLUTION RESPIRATORY (INHALATION)
Status: CANCELLED | OUTPATIENT
Start: 2025-04-11

## 2025-04-11 RX ORDER — NALOXONE HYDROCHLORIDE 0.4 MG/ML
0.2 INJECTION, SOLUTION INTRAMUSCULAR; INTRAVENOUS; SUBCUTANEOUS
Status: CANCELLED | OUTPATIENT
Start: 2025-04-11

## 2025-04-11 RX ORDER — POLYETHYLENE GLYCOL 3350 17 G/17G
17 POWDER, FOR SOLUTION ORAL DAILY PRN
Status: CANCELLED | OUTPATIENT
Start: 2025-04-11

## 2025-04-11 RX ORDER — DEXTROSE MONOHYDRATE 25 G/50ML
25-50 INJECTION, SOLUTION INTRAVENOUS
Status: CANCELLED | OUTPATIENT
Start: 2025-04-11

## 2025-04-11 RX ORDER — ALBUTEROL SULFATE 0.83 MG/ML
2.5 SOLUTION RESPIRATORY (INHALATION)
Status: DISCONTINUED | OUTPATIENT
Start: 2025-04-11 | End: 2025-04-23

## 2025-04-11 RX ORDER — OXYCODONE HYDROCHLORIDE 5 MG/1
5 TABLET ORAL EVERY 4 HOURS PRN
Status: DISCONTINUED | OUTPATIENT
Start: 2025-04-11 | End: 2025-05-09 | Stop reason: HOSPADM

## 2025-04-11 RX ORDER — ACETAMINOPHEN 325 MG/1
975 TABLET ORAL EVERY 8 HOURS SCHEDULED
Status: DISCONTINUED | OUTPATIENT
Start: 2025-04-11 | End: 2025-04-19

## 2025-04-11 RX ORDER — ENOXAPARIN SODIUM 100 MG/ML
40 INJECTION SUBCUTANEOUS EVERY 24 HOURS
Status: DISCONTINUED | OUTPATIENT
Start: 2025-04-11 | End: 2025-04-11

## 2025-04-11 RX ORDER — ONDANSETRON 4 MG/1
4 TABLET, ORALLY DISINTEGRATING ORAL EVERY 6 HOURS PRN
Status: DISCONTINUED | OUTPATIENT
Start: 2025-04-11 | End: 2025-05-09 | Stop reason: HOSPADM

## 2025-04-11 RX ORDER — NICOTINE POLACRILEX 4 MG
15-30 LOZENGE BUCCAL
Status: DISCONTINUED | OUTPATIENT
Start: 2025-04-11 | End: 2025-05-09 | Stop reason: HOSPADM

## 2025-04-11 RX ORDER — UMECLIDINIUM BROMIDE AND VILANTEROL TRIFENATATE 62.5; 25 UG/1; UG/1
1 POWDER RESPIRATORY (INHALATION) DAILY
Status: DISCONTINUED | OUTPATIENT
Start: 2025-04-12 | End: 2025-05-09 | Stop reason: HOSPADM

## 2025-04-11 RX ORDER — NALOXONE HYDROCHLORIDE 0.4 MG/ML
0.4 INJECTION, SOLUTION INTRAMUSCULAR; INTRAVENOUS; SUBCUTANEOUS
Status: DISCONTINUED | OUTPATIENT
Start: 2025-04-11 | End: 2025-05-09 | Stop reason: HOSPADM

## 2025-04-11 RX ORDER — AMLODIPINE BESYLATE 5 MG/1
5 TABLET ORAL DAILY
Status: CANCELLED | OUTPATIENT
Start: 2025-04-12

## 2025-04-11 RX ORDER — NALOXONE HYDROCHLORIDE 0.4 MG/ML
0.2 INJECTION, SOLUTION INTRAMUSCULAR; INTRAVENOUS; SUBCUTANEOUS
Status: DISCONTINUED | OUTPATIENT
Start: 2025-04-11 | End: 2025-05-09 | Stop reason: HOSPADM

## 2025-04-11 RX ORDER — AMLODIPINE BESYLATE 5 MG/1
5 TABLET ORAL DAILY
Status: DISCONTINUED | OUTPATIENT
Start: 2025-04-12 | End: 2025-05-09 | Stop reason: HOSPADM

## 2025-04-11 RX ORDER — ONDANSETRON 2 MG/ML
4 INJECTION INTRAMUSCULAR; INTRAVENOUS EVERY 6 HOURS PRN
Status: CANCELLED | OUTPATIENT
Start: 2025-04-11

## 2025-04-11 RX ORDER — POLYETHYLENE GLYCOL 3350 17 G/17G
17 POWDER, FOR SOLUTION ORAL DAILY PRN
Status: DISCONTINUED | OUTPATIENT
Start: 2025-04-11 | End: 2025-05-09 | Stop reason: HOSPADM

## 2025-04-11 RX ORDER — ONDANSETRON 4 MG/1
4 TABLET, ORALLY DISINTEGRATING ORAL EVERY 6 HOURS PRN
Status: CANCELLED | OUTPATIENT
Start: 2025-04-11

## 2025-04-11 RX ORDER — AMOXICILLIN 250 MG
1 CAPSULE ORAL 2 TIMES DAILY
Status: CANCELLED | OUTPATIENT
Start: 2025-04-11

## 2025-04-11 RX ORDER — SODIUM CHLORIDE FOR INHALATION 3 %
3 VIAL, NEBULIZER (ML) INHALATION 2 TIMES DAILY
Status: DISCONTINUED | OUTPATIENT
Start: 2025-04-11 | End: 2025-04-23

## 2025-04-11 RX ORDER — ONDANSETRON 2 MG/ML
4 INJECTION INTRAMUSCULAR; INTRAVENOUS EVERY 6 HOURS PRN
Status: DISCONTINUED | OUTPATIENT
Start: 2025-04-11 | End: 2025-04-11

## 2025-04-11 RX ORDER — ACETAMINOPHEN 325 MG/1
975 TABLET ORAL EVERY 8 HOURS
Status: CANCELLED | OUTPATIENT
Start: 2025-04-11

## 2025-04-11 RX ORDER — PIPERACILLIN SODIUM, TAZOBACTAM SODIUM 3; .375 G/15ML; G/15ML
3.38 INJECTION, POWDER, LYOPHILIZED, FOR SOLUTION INTRAVENOUS EVERY 8 HOURS
Status: CANCELLED | OUTPATIENT
Start: 2025-04-11

## 2025-04-11 RX ORDER — ATORVASTATIN CALCIUM 40 MG/1
40 TABLET, FILM COATED ORAL AT BEDTIME
Status: CANCELLED | OUTPATIENT
Start: 2025-04-11

## 2025-04-11 RX ORDER — OXYCODONE HYDROCHLORIDE 10 MG/1
10 TABLET ORAL EVERY 4 HOURS PRN
Status: DISCONTINUED | OUTPATIENT
Start: 2025-04-11 | End: 2025-05-09 | Stop reason: HOSPADM

## 2025-04-11 RX ORDER — DEXTROSE MONOHYDRATE 25 G/50ML
25-50 INJECTION, SOLUTION INTRAVENOUS
Status: DISCONTINUED | OUTPATIENT
Start: 2025-04-11 | End: 2025-05-09 | Stop reason: HOSPADM

## 2025-04-11 RX ADMIN — ATORVASTATIN CALCIUM 40 MG: 40 TABLET, FILM COATED ORAL at 21:27

## 2025-04-11 RX ADMIN — INSULIN ASPART 21 UNITS: 100 INJECTION, SOLUTION INTRAVENOUS; SUBCUTANEOUS at 18:53

## 2025-04-11 RX ADMIN — AMLODIPINE BESYLATE 5 MG: 5 TABLET ORAL at 08:57

## 2025-04-11 RX ADMIN — UMECLIDINIUM BROMIDE AND VILANTEROL TRIFENATATE 1 PUFF: 62.5; 25 POWDER RESPIRATORY (INHALATION) at 08:58

## 2025-04-11 RX ADMIN — PIPERACILLIN AND TAZOBACTAM 3.38 G: 3; .375 INJECTION, POWDER, FOR SOLUTION INTRAVENOUS at 15:29

## 2025-04-11 RX ADMIN — APIXABAN 5 MG: 5 TABLET, FILM COATED ORAL at 21:27

## 2025-04-11 RX ADMIN — PIPERACILLIN AND TAZOBACTAM 3.38 G: 3; .375 INJECTION, POWDER, FOR SOLUTION INTRAVENOUS at 21:30

## 2025-04-11 RX ADMIN — SODIUM CHLORIDE SOLN NEBU 3% 3 ML: 3 NEBU SOLN at 20:37

## 2025-04-11 RX ADMIN — SENNOSIDES AND DOCUSATE SODIUM 1 TABLET: 50; 8.6 TABLET ORAL at 08:59

## 2025-04-11 RX ADMIN — APIXABAN 5 MG: 5 TABLET, FILM COATED ORAL at 08:57

## 2025-04-11 RX ADMIN — POLYETHYLENE GLYCOL 3350 17 G: 17 POWDER, FOR SOLUTION ORAL at 08:59

## 2025-04-11 RX ADMIN — INSULIN GLARGINE 25 UNITS: 100 INJECTION, SOLUTION SUBCUTANEOUS at 21:50

## 2025-04-11 RX ADMIN — ACETAMINOPHEN 975 MG: 325 TABLET, FILM COATED ORAL at 21:30

## 2025-04-11 RX ADMIN — PIPERACILLIN AND TAZOBACTAM 3.38 G: 3; .375 INJECTION, POWDER, FOR SOLUTION INTRAVENOUS at 05:51

## 2025-04-11 RX ADMIN — ACETAMINOPHEN 975 MG: 325 TABLET, FILM COATED ORAL at 15:25

## 2025-04-11 RX ADMIN — INSULIN ASPART 3 UNITS: 100 INJECTION, SOLUTION INTRAVENOUS; SUBCUTANEOUS at 17:52

## 2025-04-11 RX ADMIN — OXYCODONE HYDROCHLORIDE 5 MG: 5 TABLET ORAL at 08:38

## 2025-04-11 RX ADMIN — ALBUTEROL SULFATE 2.5 MG: 2.5 SOLUTION RESPIRATORY (INHALATION) at 20:37

## 2025-04-11 ASSESSMENT — ACTIVITIES OF DAILY LIVING (ADL)
ADLS_ACUITY_SCORE: 64
ADLS_ACUITY_SCORE: 69
ADLS_ACUITY_SCORE: 67
ADLS_ACUITY_SCORE: 64
ADLS_ACUITY_SCORE: 67
ADLS_ACUITY_SCORE: 69
ADLS_ACUITY_SCORE: 67
ADLS_ACUITY_SCORE: 63
ADLS_ACUITY_SCORE: 67
ADLS_ACUITY_SCORE: 67
ADLS_ACUITY_SCORE: 64
ADLS_ACUITY_SCORE: 64
ADLS_ACUITY_SCORE: 63
ADLS_ACUITY_SCORE: 67
ADLS_ACUITY_SCORE: 64
ADLS_ACUITY_SCORE: 67

## 2025-04-11 NOTE — PROGRESS NOTES
[facility]  WO Nurse Inpatient Assessment     Consulted for: R groin; original consult - buttocks    Summary: 4/11 Dressing changed today prior to discharge to LTACH planned for noon today. See below for assessment, but both wounds healthy and healing well.      4/9 Wound assessment completed with Surgical PA today. VAC placed to R groin wound, but unable to place to labia wound due to proximity to vagina/anus and inability to obtain/maintain a seal.    4/8 - Patient to OR today with surgery, possible vac placement. Will work with surgical team to coordinate post op wound care needs.     4/7 - Saw with surgery PA bedside for assessment of right groin wound. Due to current status of wound, surgery is wanting to wait on VAC application. Noted when charting that pt is scheduled for further surgical debridement 4/8/25.    From initial consult: RN reported patient can be difficult to turn    WO nurse follow-up plan: weekly    Patient History (according to provider note(s):      Marly Cannon is a 59 year old woman with history of intellectual disability & memory issues, COPD*on supplemental oxygen, untreated ADEEL, tobacco use d/o, HTN, pAFib, & poorly controlled T2DM w/ recent hospitalization for DKA (3/18 - 3/20), who presented to the hospital in [facility] on 3/22/2025 with complaints of dyspnea, hyperglycemia, and chest pain. She was found to have an YADIRA, LLL PE, DKA, and A-fib with RVR. She was started on an insulin drip, therapeutic dose enoxaparin, Zosyn for possible pneumonia, and diltiazem drip for management of her RVR. Her DKA has resolved, she was switched from diltiazem to amiodarone drip for management of A-fib with RVR; however, her degree of respiratory failure was still concerning and she was intubated 3/23 for management of worsening hypoxia. She had a repeat CTA that demonstrated progression of her PE and RLL collapse concerning for mucous plugging and pneumonia. She was  switched from enoxaparin to heparin drip and her antibiotics were broadened to vancomycin and Zosyn. She was on norepinephrine for several days d/t hypotension attributed to sedation & sepsis-related vasoplegia. It appears that she has been doing fairly long SBTs since 3/26, which seemed to be going reasonably well per RT documentation. Primary team has been having issues w/ progressively increasing FiO2 needs. She had a sputum culture grow Candida & has been on fluconazole since 3/25. She has remained on the insulin drip, presumably to manage the persistent hyperglycemia during her steroid burst (for the presumed COPD exacerbation). She has received a fairly large volume of documented fluids, & was started on diuresis 3/30. She was transferred to [facility] d/t concerns that she will require a tracheostomy secondary to her inability to liberate from the vent.     Preoperative Diagnosis: Perineal and right groin infection     Postoperative Diagnosis: Perineal and right groin necrotizing soft tissue infection     Procedure: Incision and drainage of perineum and right groin     Findings: Infection traveling up the right labia into the right groin.  Final debridement included a right groin defect measuring 18 x 8 x 5 cm and a perineal wound measuring 6 x 2 x 1 cm.       Assessment:      Wound location: Right groin     4/7/25 4/9/25    Last photo: 4/11/25  Wound due to: Surgical Wound   Wound history/plan of care:    Surgical date: 4/2 and 4/8   Service following: General Surgery  Date Negative Pressure Wound Therapy initiated: 04/09/25   Interventions in place: moisture/incontinence management  Is patient s nutritional status compromised? no   If yes, what interventions are in place? N/A  Reason for initiating vac therapy? Presence of co-morbidities, High risk of infections, and Need for accelerated granulation tissue  Which?of?the?following?co-morbidities?apply? Diabetes, Immobility, and Obesity  If  diabetic is patient on a diabetic management program? Yes   Is osteomyelitis present in wound? no   If yes what treatments are in place? N/A  Wound due to: Surgical Wound  Wound history/plan of care: I&D 4/2/25 with second debridement on 4/8/25  Wound base: see photo - mix of adipose and viable red tissue     Palpation of the wound bed: normal      Drainage: moderate     Description of drainage: serosanguinous and tan     Measurements (length x width x depth, in cm): R groin wound measuring 6 cm x 17 cm x 3.5 cm     Tunneling: up to 3 cm at 9 o'clock; up to 4 cm between 4 - 5 o'clock     Undermining: from 12-2 o'clock; up to 5cm at deepest part  Periwound skin: Intact      Color: normal and consistent with surrounding tissue      Temperature: normal   Odor: none  Pain: facial expression of distress intermittently during cares; oral pain meds given 45 minutes prior to dressing change - rated highest pain at 3/10 but significant expressions of facial distress  Pain interventions prior to dressing change: slow and gentle cares  and distraction  Treatment goal: Drainage control and Infection control/prevention  STATUS: healing  Supplies ordered: supplies stored on unit, discussed with RN, and discussed with patient      Number of foam pieces removed from a wound (excluding foam for bridge) : 2 pieces GranuFoam Black  Verified this matched the number of foam pieces applied last dressing change: Yes  Number of foam pieces packed into wound (excluding foam for bridge) : 2 pieces GranuFoam Black     Pressure Injury Location: Bilateral buttocks (not reassessed today - on weekly schedule)      4/1 4/9  Last photo: 4/9  Wound type: Pressure Injury and Friction     Pressure Injury Stage: 2, present on admission   Wound history/plan of care: There was a darker area to the gluteal cleft with some maceration; unclear if it is a developing DTPI but will monitor for  changes - 4/9 Skin has sloughed off area over coccyx but remains partial--thickness at this time and measures 2 cm x 0.5 cm    Wound base: 100 % Epidermis B buttocks with new epithelial tissue and each area is approximately 2.5 cm x 2.5 cm  Periwound skin: Intact      Color: normal and consistent with surrounding tissue      Temperature: normal   Odor: none  Pain: mild, tender  Pain intervention prior to dressing change: slow and gentle cares   Treatment goal: Infection control/prevention, Protection, and Promote epidermal migration  STATUS: healed  Supplies ordered: supplies stored on unit    Treatment Plan:   Wound location: R groin  Change Days: T/F  Supplies: Large foam  Cleanse with: Saline, pat dry.  Suction: Continuous at -125 mmHg pressure.    Back up plan: If VAC malfunctions or unable to maintain seal: VAC dressing must be removed and reapplied within 2 hours of the incident. If floor staff is not able to reapply, place NS moistened gauze in wound bed and cover with appropriate dressing to keep wound bed moist.   Change wet-to-dry dressing BID and notify [facility] staff for reimplementation of VAC therapy when available.  Date canister. Chart canister output every shift.    The hospital VAC pump is not to be discharged with the patient.  Please do one of the following prior to discharge:  If a home VAC pump has been delivered, please apply the home pump to the dressing prior to patient discharge.    If the patient is discharging to LTAC or a TCU/SNF and there is a VAC pump waiting for the patient, close clamp and then disconnect the tubing for transfer, place tubing inside a glove.   If the patient is discharging to home or other facility and there is no VAC pump available for immediate placement, remove the VAC dressing and apply a wet-to-moist gauze dressing for transfer.    R labia - daily and prn with any contamination  Cleanse with Vashe and place 1 fluff of Vashe moistened gauze into wound bed    Internal  FMS - OK to remove if less than 200 mL output in a 24 hour period    Bilateral buttocks wounds: Every 3 days   Cleanse the area with wound cleanser and pat dry.  Apply No sting film barrier to periwound skin.  Cover wound with Sacral Mepilex (#[id number])  Change dressing Q 3 days.  Turn and reposition Q 2hrs side to side only.  Ensure pt has Pablo-cushion while sitting up in the chair.  If not in ICU: Ensure air pump on bed and set to Isoflex.  FYI- If pt has constant incontinent loose stools needing dressing changes Q shift please discontinue the Mepilex dressing and apply criticaid barrier paste BID and PRN.    Orders: Reviewed and Updated    RECOMMEND PRIMARY TEAM ORDER: None, at this time  Education provided: plan of care, wound progress, and Moisture management  Discussed plan of care with: Patient, Nurse, and Physicians Assistant  Notify [facility] if wound(s) deteriorate.  Nursing to notify the Provider(s) and re-consult the WO Nurse if new skin concern.    DATA:     Current support surface: Standard  Standard gel mattress (Isoflex)  Containment of urine/stool: Incontinence Protocol and Indwelling catheter  BMI: Body mass index is 37.16 kg/m .   Active diet order: Orders Placed This Encounter      Easy to Chew Diet (level 7) Thin Liquids (level 0) (NO STRAWS)     Output: I/O last 3 completed shifts:  In: 540 [P.O.:440; IV Piggyback:100]  Out: 3700 [Urine:3600; Drains:100]     Labs:   Recent Labs   Lab 04/11/25  0552   HGB 10.7*   WBC 6.3     Pressure injury risk assessment:   Sensory Perception: 3-->slightly limited  Moisture: 3-->occasionally moist  Activity: 1-->bedfast  Mobility: 2-->very limited  Nutrition: 3-->adequate  Friction and Shear: 2-->potential problem  Vicente Score: 14    Ivette Garcias, MSN RN CWOCN  Pager no longer is use, please contact through [employer] group: [facility]

## 2025-04-11 NOTE — PROGRESS NOTES
Care Management Discharge Note    Discharge Date: 04/11/2025       Discharge Disposition: LTACH    Discharge Services: None    Discharge DME: None    Discharge Transportation:  stretcher    Private pay costs discussed: transportation costs- has MA    Does the patient's insurance plan have a 3 day qualifying hospital stay waiver?  Yes     Which insurance plan 3 day waiver is available? Alternative insurance waiver    Will the waiver be used for post-acute placement? No    PAS Confirmation Code: NA  Patient/family educated on Medicare website which has current facility and service quality ratings:      Education Provided on the Discharge Plan: Yes  Persons Notified of Discharge Plans: Patient, sister, MD, RN, LTACH  Patient/Family in Agreement with the Plan: yes    Handoff Referral Completed: No, handoff not indicated or clinically appropriate    Additional Information:  Patient discharging to LTACH.  Surgery and WOC changed dressing today, cleared for discharge to LTACH.    Stretcher transport [phone].  PCS completed.    CM confirmed plan with all parties.    Beti Wilkes RN

## 2025-04-11 NOTE — PHARMACY-ADMISSION MEDICATION HISTORY
Pharmacy Note: LTACH Admission Drug Regimen Review    Initial admission medication history was completed at [facility]. Please see  med scribe - Admission Medication History note from 03/18/2025.    Medication orders were signed & held for discharge from transferring facility? Yes    Changes made to PTA medication list:  Added: None  Deleted: None  Changed: None        Admission drug regimen review has been completed. Pertinent information or medication issues identified include:    Per ID note on 4/10, planning to continue zosyn through 4/17 - will need stop date         The following PTA medications were not continued during hospitalization at [facility]:    Lisinopril 40mg daily   Primidone 50mg at bedtime for tremor when apixaban is finished   Stiolto respimate on discharge , currntly using Anoro inpatient      Please assess whether a future restart would be appropriate.       Thank you for the opportunity to participate in the care of this patient.      Erin Estrada, RPrica,  BCCP, BPS    4/11/2025 2:31 PM

## 2025-04-11 NOTE — PLAN OF CARE
"Patient arrived on the unit at 1430, admitted from [facility] via [facility] stretcher. Patient is alert and oriented x3, disoriented to time. Patient appears to be slow in responding to questions but communicates effectively when given enough time.  Patient oriented to the room and call light. Temp: 98.7  F (37.1  C) Temp src: Oral BP: 134/61 Pulse: 88   Resp: 16 SpO2: 96 % O2 Device: Nasal cannula Oxygen Delivery: 3 LPM. Patient denied having any pain or discomfort. Sister was notified of patient's admission.    Problem: Adult Inpatient Plan of Care  Goal: Plan of Care Review  Description: The Plan of Care Review/Shift note should be completed every shift.  The Outcome Evaluation is a brief statement about your assessment that the patient is improving, declining, or no change.  This information will be displayed automatically on your shiftnote.  Outcome: Progressing  Goal: Patient-Specific Goal (Individualized)  Description: You can add care plan individualizations to a care plan. Examples of Individualization might be:  \"Parent requests to be called daily at 9am for status\", \"I have a hard time hearing out of my right ear\", or \"Do not touch me to wake me up as it startlesme\".  Outcome: Progressing  Goal: Absence of Hospital-Acquired Illness or Injury  Outcome: Progressing  Goal: Optimal Comfort and Wellbeing  Outcome: Progressing  Goal: Readiness for Transition of Care  Outcome: Progressing     Problem: Pain Acute  Goal: Optimal Pain Control and Function  Outcome: Progressing     Problem: Wound  Goal: Optimal Coping  Outcome: Progressing  Goal: Optimal Functional Ability  Outcome: Progressing  Goal: Absence of Infection Signs and Symptoms  Outcome: Progressing  Goal: Improved Oral Intake  Outcome: Progressing  Goal: Optimal Pain Control and Function  Outcome: Progressing  Goal: Skin Health and Integrity  Outcome: Progressing  Goal: Optimal Wound Healing  Outcome: Progressing     Problem: " Infection  Goal: Absence of Infection Signs and Symptoms  Outcome: Progressing    Jazmin So, RN

## 2025-04-11 NOTE — PROGRESS NOTES
Pt discharging to LTACH via stretcher. All personal belongings accounted for. Report called to receiving unit. Pt will be eating lunch at facility and they are aware of 1300 zosyn due time. Wound vac capped for transport, they have wound vac ready and waiting for pt.

## 2025-04-11 NOTE — PROGRESS NOTES
Patient admitted today and is on 2.5 L-NC with no respiratory distress noted at this time. Blood pressure 134/61, pulse 85, temperature 98.7  F (37.1  C), temperature source Oral, resp. rate 20, SpO2 94%.    04/11/25 1452   Vital Signs   Resp 20   Pulse 85   Pulse Rate Source Monitor   Oxygen Therapy   SpO2 94 %   O2 Device Nasal cannula   Oxygen Delivery 2.5 LPM

## 2025-04-11 NOTE — PLAN OF CARE
Physical Therapy Discharge Summary    Reason for therapy discharge:    Discharged to LTACH    Progress towards therapy goal(s). See goals on Care Plan in [facility] electronic health record for goal details.  Goals not met.  Barriers to achieving goals:   discharge from facility.    Therapy recommendation(s):    Continued therapy is recommended.  Rationale/Recommendations:  PT at LTACH to help patient return to PLOF.

## 2025-04-11 NOTE — PROGRESS NOTES
Transport arrived late. Pt will eat lunch after arrival. All personal belongings accounted for. Wound vac capped for transport. Report called.

## 2025-04-11 NOTE — DISCHARGE SUMMARY
"[facility]  Hospitalist Discharge Summary      Date of Admission:  3/31/2025  Date of Discharge:  4/11/2025  Discharging Provider: Jair Del Valle DO  Discharge Service: Hospitalist Service    Discharge Diagnoses   Acute hypoxic respiratory failure  Ventilator associated pneumonia  Severe COPD  Obstructive sleep apnea  Acute PE  Perineal abscess, right groin  Condyloma acuminata  Diabetes type 2  YADIRA on CKD stage III  Acute blood loss anemia on anemia of chronic disease  Obesity    Clinically Significant Risk Factors     # DMII: A1C = 12.7 % (Ref range: <5.7 %) within past 6 months    # Obesity: Estimated body mass index is 37.16 kg/m  as calculated from the following:    Height as of 3/22/25: 1.651 m (5' 5\").    Weight as of this encounter: 101.3 kg (223 lb 5.2 oz).       Follow-ups Needed After Discharge   Wound care    Unresulted Labs Ordered in the Past 30 Days of this Admission       Date and Time Order Name Status Description    3/31/2025 10:00 PM Nocardia culture - BAL Site 1 Preliminary     3/31/2025 10:00 PM Legionella culture - BAL Site 1 Preliminary     3/31/2025 10:00 PM Fungus Culture, non-blood - BAL Site 1 Preliminary     3/31/2025 10:00 PM Acid-Fast Bacilli Culture and Stain In process         These results will be followed up by [facility], [place]    Discharge Disposition   Transferred to [facility]  Condition at discharge: Stable    Hospital Course   Patient is a 59-year-old female with severe COPD and DM2 [facility], respiratory failure, ventilator associated pneumonia and perianal abscess. Initially cared for in the ICU on ventilator and successfully extubated 4/1 and downgraded to floor status 4/3.  Respiratory status remained stable on 3 L nasal cannula continuously.  Infectious disease consulted and continued patient on Zosyn course to be completed through 4/17/2025.  General surgery consulted for right keiry/perineal abscess and " performed incision and drainage with Penrose drain 3/31 with subsequent repeat I&D 4/2 and 4/8.  Patient has wound VAC that surgery wants changed by wound care Tuesdays and Thursdays.   Inially on Heparin gtt transition to Eliquis for segmental and subsegmental PE.    #Acute hypoxic respiratory failure  #Ventilator associated pneumonia  #Severe COPD  #ADEEL  S/p extubation 4/1 after prolonged mechanical ventilation  HFNC O2 weaned to NC, wean as tolerated with BiPAP at night  Zosyn through 4/17  PTA inhalers    #Acute PE  Segmental and subsegmental  S/p heparin drip transition to Eliquis    #Perineal abscess, R groin  #Condyloma acuminata   Completed acyclovir course in mid March  S/p I&D with Penrose drain 3/31  Repeat I&D 4/2, 4/8  Zosyn per ID through 4/17  Rectal tube  GenSurg following and planning for vac exchange 4/11 to continue with changes Tu/Th    #Diabetes type 2, poorly controlled  A1c 12.7  Lantus, carb count 1: 5 with meals, SSI    #YADIRA on CKD stage III  Cr peaked ~2  Stable at 1.2-1.3    #Acute blood loss anemia on anemia of chronic disease  Hgb stable in 10 range        Consultations This Hospital Stay   PHARMACY IP CONSULT  NUTRITION SERVICES ADULT IP CONSULT  WOUND OSTOMY CONTINENCE NURSE  IP CONSULT  PHARMACY TO DOSE VANCO  PHARMACY IP CONSULT  CARE MANAGEMENT / SOCIAL WORK IP CONSULT  SURGERY GENERAL IP CONSULT  PHARMACY LIAISON FOR MEDICATION COVERAGE CONSULT  PHARMACY IP CONSULT  PHYSICAL THERAPY ADULT IP CONSULT  OCCUPATIONAL THERAPY ADULT IP CONSULT  INFECTIOUS DISEASES IP CONSULT  SPEECH LANGUAGE PATH ADULT IP CONSULT  HOSPITALIST IP CONSULT  WOUND OSTOMY CONTINENCE NURSE  IP CONSULT  PHYSICAL THERAPY ADULT IP CONSULT  OCCUPATIONAL THERAPY ADULT IP CONSULT  SPEECH LANGUAGE PATH ADULT IP CONSULT    Code Status   Full Code    Time Spent on this Encounter   IJair DO, personally saw the patient today and spent greater than 30 minutes discharging this patient.       Jair Del Valle,  [facility]  [address] [place] [id number]  Phone: [phone]  Fax: [phone]  ______________________________________________________________________    Physical Exam   Vital Signs: Temp: 98.4  F (36.9  C) Temp src: Oral BP: 134/75 Pulse: 92   Resp: 18 SpO2: 94 % O2 Device: Nasal cannula Oxygen Delivery: 3 LPM  Weight: 223 lbs 5.22 oz  General Appearance:  No acute distress  Respiratory: Clear to auscultation bilaterally  Cardiovascular: Regular rate and rhythm  GI: Normal bowel sounds, abdomen is soft with no rebound  Neuro: Alert and oriented x 3, normal speech       Primary Care Physician   Amberly Whyte    Discharge Orders      Follow Up and recommended labs and tests    Wound care follow up     Tubes and Drains    Current Tubes and Drains:     CVC Line  Duration           CVC Triple Lumen Right Internal jugular 18 days        Drain  Duration           Urinary Drain 03/31/25 Urethral Catheter 10 days    Drain Open Right;Anterior Groin  2 days    Rectal Tube With balloon 2 days    Negative Pressure Wound Therapy Other (Comment) Anterior;Right 1 day        Packing  Duration           Wound with Packing 04/08/25 Right Labia Qty Placed: 1 2 days         To straight gravity drainage. Change catheter every 2 weeks and PRN for leaking or decreased urine output with signs of bladder distention. DO NOT change catheter without a specific Provider order IF diagnosis of benign prostatic hypertrophy (BPH), neurogenic bladder, or other urological conditions      Reason for your hospital stay    Respiratory failure, ventilator associated pneumonia, perineal abscess     Activity - Up with nursing assistance     Full Code     Physical Therapy Adult Consult    Evaluate and treat as clinically indicated.    Reason: Physical deconditioning     Occupational Therapy Adult Consult    Evaluate and treat as clinically indicated.    Reason: Physical deconditioning     Speech  Language Path Adult Consult    Evaluate and treat as clinically indicated.    Reason: Dysphagia     Oxygen (SNF/TCU) Discharge     Fall precautions     Diet    Follow this diet upon discharge: Current Diet:Orders Placed This Encounter      Snacks/Supplements Adult: Magic Cup; With Meals      Snacks/Supplements Adult: Gelatein 20 (sugar-free); With Meals      Snacks/Supplements Adult: Expedite Cup; With Meals      Easy to Chew Diet (level 7) Thin Liquids (level 0) (NO STRAWS)       Significant Results and Procedures   Most Recent 3 CBC's:  Recent Labs   Lab Test 04/11/25  0552 04/10/25  0514 04/09/25  0531   WBC 6.3 7.2 7.9   HGB 10.7* 10.4* 10.7*   MCV 95 95 94    266 263     Most Recent 3 BMP's:  Recent Labs   Lab Test 04/11/25  0750 04/11/25  0552 04/10/25  2107 04/10/25  0809 04/10/25  0514 04/09/25  0721 04/09/25  0531   NA  --  140  --   --  140  --  136   POTASSIUM  --  4.7  --   --  4.4  --  4.7   CHLORIDE  --  100  --   --  99  --  96*   CO2  --  32*  --   --  35*  --  38*   BUN  --  28.0*  --   --  23.2*  --  19.0   CR  --  1.18*  --   --  1.29*  --  1.27*   ANIONGAP  --  8  --   --  6*  --  2*   NORM  --  9.5  --   --  9.2  --  9.1   * 181* 151*   < > 152*   < > 238*    < > = values in this interval not displayed.     Most Recent 2 LFT's:  Recent Labs   Lab Test 03/31/25 2206 03/31/25  1735   AST 25 20   ALT 16 14   ALKPHOS 62 59   BILITOTAL 0.3 0.3     7-Day Micro Results       No results found for the last 168 hours.          Most Recent Hemoglobin A1c:  Recent Labs   Lab Test 03/22/25  1731   A1C 12.7*     Most Recent ABG:  Recent Labs   Lab Test 04/01/25  0418   PH 7.44   PO2 63*   PCO2 39   HCO3 27   DANE 2.3   ,   Results for orders placed or performed during the hospital encounter of 03/31/25   XR Chest Port 1 View    Narrative    EXAM: XR CHEST PORT 1 VIEW  LOCATION: [facility]  DATE: 3/31/2025    INDICATION: Endotracheal tube positioning.  COMPARISON: Portable  chest single view 3/31/2025 at 0608 hours.      Impression    IMPRESSION: Endotracheal tube tip 6.5 cm above the carlos. Enteric tube tip below the diaphragm. Right IJ catheter tip in the SVC. Improved bilateral pleural effusions and associated passive atelectasis in the adjacent lungs. Normal cardiac size and   pulmonary vascularity. Atherosclerotic thoracic aorta. Distal right rotator cuff calcific arthropathy. Monitoring leads overlying the chest.   XR Abdomen Port 1 View    Narrative    EXAM: XR ABDOMEN PORT 1 VIEW  LOCATION: [facility]  DATE: 3/31/2025    INDICATION: Check NG OG tube placement  COMPARISON: None.      Impression    IMPRESSION: Orogastric tube tip in the stomach. Mild gaseous distention of the transverse colon. No abnormal calcification. Hazy opacity in the imaged lower lungs compatible with pleural fluid and atelectasis.   CT Chest w/o Contrast    Narrative    EXAM: CT CHEST W/O CONTRAST  LOCATION: [facility]  DATE: 3/31/2025    INDICATION: VAP w  multilobar collapse, assess aeration post bronchoscopy  COMPARISON: 3/24/2025.  TECHNIQUE: CT chest without IV contrast. Multiplanar reformats were obtained. Dose reduction techniques were used.  CONTRAST: None.    FINDINGS:   LUNGS AND PLEURA: There are small bilateral pleural effusions layering dependently. There is complete right lower and near complete left lower lobe atelectasis. There is mild groundglass density in the right middle lobe. Mild emphysema. Endotracheal tube   6 cm above the carlos. No pneumothorax.    MEDIASTINUM/AXILLAE: Thoracic aorta normal in caliber. Pulmonary arteries normal in caliber. No abnormally enlarged lymph nodes. Right IJ line terminates in the mid SVC. Gastric tube in the esophagus terminating in the stomach. No pericardial effusion.    CORONARY ARTERY CALCIFICATION: Severe.    UPPER ABDOMEN: Normal.    MUSCULOSKELETAL: Normal.      Impression    IMPRESSION:   1.   "Complete right and near complete left lower lobe collapse.  2.  Mild groundglass density in the right middle lobe, nonspecific.     XR Chest Port 1 View    Narrative    EXAM: XR CHEST PORT 1 VIEW  LOCATION: [facility]  DATE: 4/2/2025    INDICATION: SOB.  COMPARISON: 3/31/2025.      Impression    IMPRESSION: Right IJ central venous catheter tip over the upper SVC. Heart size and pulmonary vascularity within normal limits. There remains some slight haziness in the left lower lung which is improved compared with the prior study. Right lung appears   clear on today's study. No significant bony abnormalities.   XR Chest Port 1 View    Narrative    EXAM: XR CHEST PORT 1 VIEW  LOCATION: [facility]  DATE: 4/5/2025    INDICATION: worsening hypoxia  COMPARISON: Chest x-ray 4/2/2025      Impression    IMPRESSION: Stable right internal jugular central venous catheter tip at the mid SVC. Cardiac silhouette size is unchanged. Taking into consideration differences in degree of inspiration the lungs are unchanged. No new airspace opacities. Calcifications   around the right glenohumeral joint which may be tendinous in origin.   CT Abdomen Pelvis w Contrast    Narrative    EXAM: CT ABDOMEN PELVIS W CONTRAST  LOCATION: [facility]  DATE: 04/07/2025    INDICATION: Follow up perianal abscess s/p incision and drainage, assess for residual infection.  COMPARISON: CT abdomen and pelvis from 03/24/2025.  TECHNIQUE: CT scan of the abdomen and pelvis was performed following injection of IV contrast. Multiplanar reformats were obtained. Dose reduction techniques were used.  CONTRAST: Isovue 370: 75 mL.    FINDINGS:   LOWER CHEST: Small pleural effusions with dependent atelectasis.    HEPATOBILIARY: Normal.    PANCREAS: Normal.    SPLEEN: Normal.    ADRENAL GLANDS: Normal.    KIDNEYS/BLADDER: Bilateral perinephric \"sweat,\" similar to recent prior imaging study and " possibly representing acute or chronic kidney disease. Correlate with serum creatinine. No hydronephrosis. Small simple-appearing bilateral renal cysts. No followup   needed.    BOWEL: Small hiatal hernia. No bowel distention. Large amount of colonic stool. No intraperitoneal fluid.    LYMPH NODES: Normal.    VASCULATURE: Aortoiliac atherosclerosis without abdominal aortic aneurysm.    PELVIC ORGANS: Landrum catheter in a nondistended urinary bladder. Gas within the urinary bladder cavity may be from retrograde reflux of gas from the Landrum catheter, but gas-forming bladder infection can appear similar. Correlate with urinalysis.    MUSCULOSKELETAL: Large surgical defect in the right lower quadrant abdominal wall/mons pubis, which extends into the right side of the perineum with likely indwelling surgical drain and surgical packing material. Small residual rim-enhancing fluid in the   right labia majora measuring roughly 3 x 2 x 3 cm (series 2, image 99) abutting the suspected packed surgical material.      Impression    IMPRESSION:   1.  Large surgical defect in the RLQ abdominal wall and right side of the perineum, containing surgical packing material and likely surgical drain. Small residual area of rim-enhancing fluid (suspected abscess) measures roughly 3 x 2 x 3 cm (series 2,   image 99).  2.  Large amount of colonic stool, possibly from pain medication.  3.  Gas within the urinary bladder is probably refluxed from the Landrum catheter, but correlate with urinalysis to fully exclude the possibility of a coexisting urinary tract infection.   XR Chest Port 1 View    Narrative    EXAM: XR CHEST PORT 1 VIEW  LOCATION: [facility]  DATE: 4/9/2025    INDICATION: VAP follow up  COMPARISON: 4/5/2025 radiograph. 4/7/2025 CT.      Impression    IMPRESSION: A right IJ catheter terminates over the right upper mediastinum. No pleural fluid appreciated on this study. No pneumothorax. No definite airspace  disease. Normal size of the heart.       Discharge Medications   Current Discharge Medication List        CONTINUE these medications which have NOT CHANGED    Details   acetaminophen (TYLENOL) 325 MG tablet Take 325 mg by mouth every 6 hours as needed for mild pain.      acyclovir (ZOVIRAX) 400 MG tablet Take 1 tablet (400 mg) by mouth 3 times daily for 7 days.  Qty: 21 tablet, Refills: 0    Associated Diagnoses: Genital herpes simplex, unspecified site      albuterol (PROAIR HFA/PROVENTIL HFA/VENTOLIN HFA) 108 (90 Base) MCG/ACT inhaler Inhale 2 puffs into the lungs daily as needed for shortness of breath.      amLODIPine (NORVASC) 5 MG tablet Take 1 tablet (5 mg) by mouth daily  Qty: 90 tablet, Refills: 3    Associated Diagnoses: Essential hypertension      aspirin (ASPIRIN LOW DOSE) 81 MG chewable tablet CHEW AND SWALLOW 1 TABLET BY MOUTH DAILY  Qty: 90 tablet, Refills: 1    Associated Diagnoses: Essential hypertension      atorvastatin (LIPITOR) 40 MG tablet TAKE 1 TABLET BY MOUTH AT BEDTIME  Qty: 90 tablet, Refills: 2    Associated Diagnoses: Hyperlipidemia, unspecified hyperlipidemia type      blood glucose (ONETOUCH ULTRA) test strip USE TO TEST BLOOD SUGAR 4 TIMES DAILY  Qty: 100 strip, Refills: 4    Associated Diagnoses: Type 2 diabetes mellitus with hyperglycemia, with long-term current use of insulin (H)      Cholecalciferol (VITAMIN D3) 50 MCG (2000 UT) CAPS TAKE 1 CAPSULE BY MOUTH DAILY  Qty: 90 capsule, Refills: 0    Associated Diagnoses: Vitamin D deficiency      !! Continuous Glucose Sensor (DEXCOM G7 SENSOR) MISC 1 each every 10 days. Change sensor every 10 days  Qty: 9 each, Refills: 5    Associated Diagnoses: Type 2 diabetes mellitus with hyperglycemia, with long-term current use of insulin (H)      !! Continuous Glucose Sensor (FREESTYLE HANK 2 PLUS SENSOR) MISC 1 each every 14 days.  Qty: 2 each, Refills: 5    Associated Diagnoses: Type 2 diabetes mellitus with hyperglycemia, with long-term  current use of insulin (H)      empagliflozin (JARDIANCE) 25 MG TABS tablet Take 1 tablet (25 mg) by mouth daily  Qty: 90 tablet, Refills: 3    Associated Diagnoses: Type 2 diabetes, HbA1c goal < 7% (H)      insulin aspart (NOVOLOG VIAL) 100 UNITS/ML vial TO BE USED WITH INSULIN PUMP, MAX DAILY UNITS 70  Qty: 30 mL, Refills: 3    Associated Diagnoses: Type 2 diabetes mellitus with hyperglycemia, with long-term current use of insulin (H)      !! Insulin Disposable Pump (OMNIPOD 5 LIBRE2 PLUS G6 PODS) MISC 1 each every 72 hours.  Qty: 10 each, Refills: 5    Associated Diagnoses: Type 2 diabetes mellitus with hyperglycemia, with long-term current use of insulin (H)      Insulin Disposable Pump (OMNIPOD 5 LIBRE2 PLUS G6) KIT 1 each every 72 hours.  Qty: 1 kit, Refills: 0    Associated Diagnoses: Type 2 diabetes mellitus with hyperglycemia, with long-term current use of insulin (H)      !! Insulin Disposable Pump (OMNIPOD DASH PODS, GEN 4,) MISC Inject 1 pod subcutaneously every 48 hours.  Qty: 15 each, Refills: 5    Associated Diagnoses: Type 2 diabetes mellitus with hyperglycemia, with long-term current use of insulin (H)      insulin glargine U-300 (TOUJEO SOLOSTAR) 300 UNIT/ML (1 units dial) pen Inject 20 Units Subcutaneous At Bedtime  Qty: 9 mL, Refills: 1      insulin pen needle (32G X 4 MM) 32G X 4 MM miscellaneous Use 1 pen needles daily  Qty: 100 each, Refills: 3    Associated Diagnoses: Type 2 diabetes mellitus with hyperglycemia, without long-term current use of insulin (H)      INSULIN PUMP - OUTPATIENT Insulin pump  Omnipod Dash  Date: 1/23/25  Basal: 1.3 (new)  Total basal: 31.2   CR:15  ISF: 50  BG target: 120  BG correction: 130  Active insulin: 4 hours    Associated Diagnoses: Type 2 diabetes mellitus with hyperglycemia, with long-term current use of insulin (H)      ipratropium-albuterol (COMBIVENT RESPIMAT)  MCG/ACT inhaler INHALE 1 PUFF INTO THE LUNGS 4 TIMES DAILY  Qty: 4 g, Refills: 2     Associated Diagnoses: Pulmonary emphysema, unspecified emphysema type (H)      ketoconazole (NIZORAL) 2 % external cream Apply topically daily  Qty: 60 g, Refills: 1    Associated Diagnoses: Seborrheic dermatitis      lisinopril (ZESTRIL) 40 MG tablet TAKE 1 TABLET BY MOUTH DAILY  Qty: 90 tablet, Refills: 1    Associated Diagnoses: Essential hypertension      OneTouch Delica Lancets 33G MISC Inject 1 each Subcutaneous 3 times daily (before meals)  Qty: 100 each, Refills: 11    Comments: Do you deliver?   Please call patient ([phone]) to let them know.  Thanks  Associated Diagnoses: Type 2 diabetes mellitus with hyperglycemia, with long-term current use of insulin (H)      order for DME Women briefs  Qty: 50 each, Refills: 1    Associated Diagnoses: Female stress incontinence      primidone (MYSOLINE) 50 MG tablet TAKE 1 TABLET BY MOUTH AT BEDTIME  Qty: 30 tablet, Refills: 0    Associated Diagnoses: Essential tremor      tiotropium-olodaterol 2.5-2.5 MCG/ACT AERS Inhale 2 puffs into the lungs daily      triamcinolone (KENALOG) 0.1 % external ointment Apply topically 2 times daily as needed (foot irritation).       !! - Potential duplicate medications found. Please discuss with provider.        Allergies   No Known Allergies

## 2025-04-11 NOTE — PROGRESS NOTES
General Surgery Progress Note:    Hospital Day # 11    ASSESSMENT:  1. Pressure injury of buttock, stage 2, unspecified laterality (H) [L89.302]    2. Perineal abscess    3. Non-healing surgical wound of right groin, initial encounter [T81.89XA]        Marly Cannon is a 59 year old female PMH COPD on home O2, DM2 12.7% 3/22, recently admitted for DKA and acute hypoxic respiratory failure with LL lobe PE, complicated by VAP, chronic decubitus ulcer s/p bedside I&D and Penrose placement 3/31 s/p incision and drainage of NSTI 4/2 with Penrose placements and takeback 4/8 for further washout and replacement of Penrose drain. Wound vac placed 4/9 on right groin wound and through labial tunnel, packing posterior labial wound. Vac change 4/11 with good granulation tissue within wound and overall quite clean. Okay for discharge with wound cares from surgical standpoint    PLAN:  -Diet as tolerated  -Appreciate WOC cares and wound vac changes; okay for T, Th changes and packing posterior labial wound daily (unless soiled).  -Okay for WOC removal of penrose when deemed appropriate  -Okay to discharge from surgical standpoint today to LTACH  -Discharge recommendations involve instructions placed in AVS.  -Follow up with wound care  -No further debridement or surgical intervention required at this time  -Medical management per primary team    SUBJECTIVE:   Marly Cannon was seen on rounds. States no other complaints. Pain with exchange of wound vac. Ready for LTACH    VITALS RANGE:  Temp:  [98.4  F (36.9  C)-98.9  F (37.2  C)] 98.4  F (36.9  C)  Pulse:  [90-94] 92  Resp:  [16-22] 18  BP: (134-151)/(63-75) 134/75  SpO2:  [93 %-98 %] 94 %    PHYSICAL EXAM:  General: patient seen resting in bed in no acute distress  Resp: no increased work of breathing, breathing comfortably on 3L oxygen  Abdomen: generally soft  Drains: wound vac removed and right groin wound generally clean with good grandulation tissue and tunneling in the  lateral side is reduced from previously. Labia tunnel is clean and with evidence of good blood supply  Posterior labial packing done and clean   Output by Drain (mL) 04/09/25 0700 - 04/09/25 1459 04/09/25 1500 - 04/09/25 2259 04/09/25 2300 - 04/10/25 0659 04/10/25 0700 - 04/10/25 1459 04/10/25 1500 - 04/10/25 2259 04/10/25 2300 - 04/11/25 0659 04/11/25 0700 - 04/11/25 0934   Negative Pressure Wound Therapy Other (Comment) Anterior;Right 0    100 0       Extremities: No edema or cyanosis visualized on exam, no obvious deformities    04/10 0700 - 04/11 0659  In: 540 [P.O.:440]  Out: 3700 [Urine:3600; Drains:100]    No results displayed because visit has over 200 results.           ANNETTE Dudley [facility] General Surgery  [address] [place] [address] [id number] [place] [address] [phone]

## 2025-04-11 NOTE — PLAN OF CARE
Occupational Therapy Discharge Summary    Reason for therapy discharge:    Discharging to LTACH    Progress towards therapy goal(s). See goals on Care Plan in [facility] electronic health record for goal details.  Progressing towards goals.    Therapy recommendation(s):    Recommend continued OT @ LTACH.

## 2025-04-11 NOTE — H&P
"LTACH    History and Physical - Hospitalist Service       Date of Admission:  4/11/2025    Assessment & Plan      Acute Hypoxic Respiratory Failure  VAP, PTA  COPD, severe  ADEEL  - zosyn through 4/17  - inhalers as needed  - weaned off HFNC, now NC and Bipap at night    Perineal Abscess  Condyloma Acuminata  - s/p acyclovir  - I&D x3  - zosyn through 4/17  - rectal tube in place for wound protection  - noguera for wound  - VAC in place  - WOC consult    PE, Acute, segmental, subsegmental  - apixaban     T2DM  - A1c 12.7  - lantus 25u daily  - I:C of 1:5 with meals  - ISS moderate    CKD3  - monitor Cr        Diet:    DVT Prophylaxis: Enoxaparin (Lovenox) SQ  Noguera Catheter: PRESENT, indication:    Lines: PRESENT             Cardiac Monitoring: None  Code Status:      Clinically Significant Risk Factors Present on Admission             # Hypomagnesemia: Lowest Mg = 1.6 mg/dL in last 2 days, will replace as needed       # Hypertension: Noted on problem list      # Anemia: based on hgb <11      # DMII: A1C = 12.7 % (Ref range: <5.7 %) within past 6 months    # Obesity: Estimated body mass index is 37.16 kg/m  as calculated from the following:    Height as of 3/22/25: 1.651 m (5' 5\").    Weight as of an earlier encounter on 4/11/25: 101.3 kg (223 lb 5.2 oz).              Disposition Plan     Medically Ready for Discharge: Anticipated in 5+ Days           Tomas Benitez MD  Hospitalist Service  LTACH  Securely message with [employer] (more info)  Text page via [facility] Paging/Directory     ______________________________________________________________________    Chief Complaint   Acute hypoxic respiratory failure   Perineal Abscess    History of Present Illness   Marly Cannon is a 59y F PMHx COPD and T2DM who presents to [place] for wound cares, therapies, and complex medical care. Pt transferred to [facility] from OSH with PE, respiratory failure, CAP, and perineal abscess. Pt was extubated on 4/1 although she needed HFNC and " still requires BiPAP at night intermittently. ID following and pt on zosyn for infection. Surgery followed the pt and performed I&D with penrose drain 3/31 with f/u I&D on 4/2 and 4/8. A wound vac was placed by surgery as well. Pt did well and was transferred to [facility] for ongoing cares.       Past Medical History    Past Medical History:   Diagnosis Date    COPD (chronic obstructive pulmonary disease) (H)     Diabetes (H)     Hypertension        Past Surgical History   Past Surgical History:   Procedure Laterality Date    BIOPSY BREAST Left 02/14/2008    patient states no bx, Clip in left breast/ stereo bxc 2-- no path in EPIC that far back    COLONOSCOPY N/A 08/20/2015    Procedure: COLONOSCOPY;  Surgeon: Gentry Porter MD;  Location: [facility] OR    COLONOSCOPY N/A 09/21/2018    Procedure: COLONOSCOPY;  COLONOSCOPY WITH POLYPECTOMY;  Surgeon: Gentry Porter MD;  Location: [facility] OR    INCISION AND DRAINAGE PERINEAL, COMBINED N/A 4/2/2025    Procedure: INCISION AND DRAINAGE, PERINEUM;  Surgeon: Jeremy Lyons MD;  Location: [facility] OR    IRRIGATION AND DEBRIDEMENT SACRAL WOUND, COMBINED Right 4/8/2025    Procedure: IRRIGATION AND DEBRIDEMENT, WOUND, RIGHT GROIN AND LABIA;  Surgeon: Casey Rossi MD;  Location: [facility] OR       Prior to Admission Medications   Cannot display prior to admission medications because the patient has not been admitted in this contact.        Review of Systems    The 10 point Review of Systems is negative other than noted in the HPI.    Social History   I have reviewed this patient's social history and updated it with pertinent information if needed.  Social History     Tobacco Use    Smoking status: Former     Current packs/day: 1.00     Average packs/day: 1 pack/day for 46.3 years (46.3 ttl pk-yrs)     Types: Cigarettes     Start date: 1/1/1979     Passive exposure: Current    Smokeless tobacco: Never    Tobacco comments:     Declined QP  05/13/2020   Vaping Use    Vaping status: Never Used   Substance Use Topics    Alcohol use: No     Alcohol/week: 0.0 standard drinks of alcohol    Drug use: No         Family History   I have reviewed this patient's family history and updated it with pertinent information if needed.  Family History   Problem Relation Age of Onset    Diabetes Mother     Diabetes Father     Hypertension Brother     Diabetes Sister          Allergies   No Known Allergies     Physical Exam   Vital Signs:                    Weight: 0 lbs 0 oz  Gen: awake, alert, normal appearing, comfortable  HEET: EOMI, MMM  CV: RRR  Pulm: CTAB  GI: soft, NT/ND  Integ: see WOC note for full assessment      Medical Decision Making       90 MINUTES SPENT BY ME on the date of service doing chart review, history, exam, documentation & further activities per the note.      Data     I have personally reviewed the following data over the past 24 hrs:    6.3  \   10.7 (L)   / 244     140 100 28.0 (H) /  188 (H)   4.7 32 (H) 1.18 (H) \       Imaging results reviewed over the past 24 hrs:   No results found for this or any previous visit (from the past 24 hours).

## 2025-04-11 NOTE — PROGRESS NOTES
[facility]  ([address])    Marly Cannon has been accepted for admission to [facility] today.       Ride: [id number]    RN to RN report: Please call [address] at [phone] for nurse to nurse report before the pt discharges.     Accepting MD: Dr. Tomas Benitez.  Please contact Dr. Benitez for a provider to provider report before the pt discharges.    Documentation needed prior to discharge: A visible discharge summary and discharge/readmission orders     Sera Sullivan CM  [facility] Referral Specialist  [facility]  [address] [address]  Admissions Office: [phone]  Fax: [phone]     CONFIDENTIAL Protected under [place] Statute  145.61 et seq

## 2025-04-11 NOTE — PLAN OF CARE
Goal Outcome Evaluation:         Pt AOX3, disorientated to date only. Pt aware of plan for discharge to LTACH today via stretcher. Pt wound vac and wound care done by WOC RN. premedicated with oxycodone prior and pt reports better pain control Surgery PA present for dressing change. Pt has rectal tube that does not meet criteria to stay in, ok to keep in until after transport to LTACH, receiving nurse to discontinue. Pt will eat lunch and get next IV zosyn after transport. CHG done, pt denies pain or discomfort. Call light within reach.

## 2025-04-11 NOTE — PLAN OF CARE
Goal Outcome Evaluation:       No acute issues overnight. Vitals stable, denied pain. Rectal tube and noguera catheter intact. Wound vac intact to R groin area; dressing to R labia moist due to be changed on day shift. Pt to be discharged today to LTACH today.

## 2025-04-12 LAB
GLUCOSE BLDC GLUCOMTR-MCNC: 171 MG/DL (ref 70–99)
GLUCOSE BLDC GLUCOMTR-MCNC: 215 MG/DL (ref 70–99)
GLUCOSE BLDC GLUCOMTR-MCNC: 215 MG/DL (ref 70–99)
GLUCOSE BLDC GLUCOMTR-MCNC: 287 MG/DL (ref 70–99)

## 2025-04-12 PROCEDURE — 94640 AIRWAY INHALATION TREATMENT: CPT | Mod: 76

## 2025-04-12 PROCEDURE — 94660 CPAP INITIATION&MGMT: CPT

## 2025-04-12 PROCEDURE — 5A09357 ASSISTANCE WITH RESPIRATORY VENTILATION, LESS THAN 24 CONSECUTIVE HOURS, CONTINUOUS POSITIVE AIRWAY PRESSURE: ICD-10-PCS | Performed by: STUDENT IN AN ORGANIZED HEALTH CARE EDUCATION/TRAINING PROGRAM

## 2025-04-12 PROCEDURE — 250N000009 HC RX 250: Performed by: STUDENT IN AN ORGANIZED HEALTH CARE EDUCATION/TRAINING PROGRAM

## 2025-04-12 PROCEDURE — 120N000017 HC R&B RESPIRATORY CARE

## 2025-04-12 PROCEDURE — 94640 AIRWAY INHALATION TREATMENT: CPT

## 2025-04-12 PROCEDURE — 250N000013 HC RX MED GY IP 250 OP 250 PS 637: Performed by: STUDENT IN AN ORGANIZED HEALTH CARE EDUCATION/TRAINING PROGRAM

## 2025-04-12 PROCEDURE — 999N000157 HC STATISTIC RCP TIME EA 10 MIN

## 2025-04-12 PROCEDURE — 99232 SBSQ HOSP IP/OBS MODERATE 35: CPT | Performed by: STUDENT IN AN ORGANIZED HEALTH CARE EDUCATION/TRAINING PROGRAM

## 2025-04-12 PROCEDURE — 250N000011 HC RX IP 250 OP 636: Performed by: STUDENT IN AN ORGANIZED HEALTH CARE EDUCATION/TRAINING PROGRAM

## 2025-04-12 RX ORDER — LIDOCAINE 40 MG/G
CREAM TOPICAL
Status: DISCONTINUED | OUTPATIENT
Start: 2025-04-12 | End: 2025-05-05

## 2025-04-12 RX ADMIN — INSULIN ASPART 6 UNITS: 100 INJECTION, SOLUTION INTRAVENOUS; SUBCUTANEOUS at 12:24

## 2025-04-12 RX ADMIN — APIXABAN 5 MG: 5 TABLET, FILM COATED ORAL at 21:28

## 2025-04-12 RX ADMIN — INSULIN ASPART 4 UNITS: 100 INJECTION, SOLUTION INTRAVENOUS; SUBCUTANEOUS at 08:06

## 2025-04-12 RX ADMIN — ALBUTEROL SULFATE 2.5 MG: 2.5 SOLUTION RESPIRATORY (INHALATION) at 13:01

## 2025-04-12 RX ADMIN — UMECLIDINIUM BROMIDE AND VILANTEROL TRIFENATATE 1 PUFF: 62.5; 25 POWDER RESPIRATORY (INHALATION) at 08:06

## 2025-04-12 RX ADMIN — ALBUTEROL SULFATE 2.5 MG: 2.5 SOLUTION RESPIRATORY (INHALATION) at 08:14

## 2025-04-12 RX ADMIN — ACETAMINOPHEN 975 MG: 325 TABLET, FILM COATED ORAL at 13:45

## 2025-04-12 RX ADMIN — ATORVASTATIN CALCIUM 40 MG: 40 TABLET, FILM COATED ORAL at 21:28

## 2025-04-12 RX ADMIN — INSULIN GLARGINE 25 UNITS: 100 INJECTION, SOLUTION SUBCUTANEOUS at 21:29

## 2025-04-12 RX ADMIN — ACETAMINOPHEN 975 MG: 325 TABLET, FILM COATED ORAL at 05:35

## 2025-04-12 RX ADMIN — INSULIN ASPART 23 UNITS: 100 INJECTION, SOLUTION INTRAVENOUS; SUBCUTANEOUS at 12:50

## 2025-04-12 RX ADMIN — INSULIN ASPART 2 UNITS: 100 INJECTION, SOLUTION INTRAVENOUS; SUBCUTANEOUS at 17:15

## 2025-04-12 RX ADMIN — INSULIN ASPART 13 UNITS: 100 INJECTION, SOLUTION INTRAVENOUS; SUBCUTANEOUS at 17:17

## 2025-04-12 RX ADMIN — SODIUM CHLORIDE SOLN NEBU 3% 3 ML: 3 NEBU SOLN at 19:46

## 2025-04-12 RX ADMIN — SODIUM CHLORIDE SOLN NEBU 3% 3 ML: 3 NEBU SOLN at 08:14

## 2025-04-12 RX ADMIN — PIPERACILLIN AND TAZOBACTAM 3.38 G: 3; .375 INJECTION, POWDER, FOR SOLUTION INTRAVENOUS at 13:44

## 2025-04-12 RX ADMIN — INSULIN ASPART 32 UNITS: 100 INJECTION, SOLUTION INTRAVENOUS; SUBCUTANEOUS at 09:36

## 2025-04-12 RX ADMIN — PIPERACILLIN AND TAZOBACTAM 3.38 G: 3; .375 INJECTION, POWDER, FOR SOLUTION INTRAVENOUS at 21:28

## 2025-04-12 RX ADMIN — ALBUTEROL SULFATE 2.5 MG: 2.5 SOLUTION RESPIRATORY (INHALATION) at 19:46

## 2025-04-12 RX ADMIN — ACETAMINOPHEN 975 MG: 325 TABLET, FILM COATED ORAL at 21:28

## 2025-04-12 RX ADMIN — PIPERACILLIN AND TAZOBACTAM 3.38 G: 3; .375 INJECTION, POWDER, FOR SOLUTION INTRAVENOUS at 05:36

## 2025-04-12 RX ADMIN — APIXABAN 5 MG: 5 TABLET, FILM COATED ORAL at 08:01

## 2025-04-12 RX ADMIN — AMLODIPINE BESYLATE 5 MG: 5 TABLET ORAL at 08:01

## 2025-04-12 RX ADMIN — LIDOCAINE HYDROCHLORIDE 0.5 ML: 10 INJECTION, SOLUTION EPIDURAL; INFILTRATION; INTRACAUDAL; PERINEURAL at 13:40

## 2025-04-12 ASSESSMENT — ACTIVITIES OF DAILY LIVING (ADL)
ADLS_ACUITY_SCORE: 69
ADLS_ACUITY_SCORE: 68
ADLS_ACUITY_SCORE: 68
ADLS_ACUITY_SCORE: 64
ADLS_ACUITY_SCORE: 69
ADLS_ACUITY_SCORE: 65
ADLS_ACUITY_SCORE: 69
ADLS_ACUITY_SCORE: 64
ADLS_ACUITY_SCORE: 64
ADLS_ACUITY_SCORE: 68
ADLS_ACUITY_SCORE: 69
ADLS_ACUITY_SCORE: 64
ADLS_ACUITY_SCORE: 69
ADLS_ACUITY_SCORE: 69
ADLS_ACUITY_SCORE: 65
ADLS_ACUITY_SCORE: 64
ADLS_ACUITY_SCORE: 69
ADLS_ACUITY_SCORE: 64
ADLS_ACUITY_SCORE: 68

## 2025-04-12 NOTE — PROVIDER NOTIFICATION
INITIAL NIGHTS BIPAP/CPAP NOTE    Patient has been having desaturation event and was placed on BiPAP on settings below. SpO2 96  % and Skin protective barriers were applied. Pt was able to tolerated it just for a while and stated to be removed off the BiPAP, Currently on 4L Oxymask. RT will follow.      04/12/25 0334   CPAP/BiPAP/Settings   $CPAP/BiPAP Initial completed   BIPAP/CPAP On Standby On   IPAP/EPAP (cmH2O) 18/8   Rate (breaths/min) 16   Oxygen (%) 40   Timed Inspiration (sec) 0.9   IPAP RISE  Settings (V60) 3

## 2025-04-12 NOTE — PROGRESS NOTES
[facility]    Medicine Progress Note - Hospitalist Service    Date of Admission:  4/11/2025    Hospital Course  Marly Cannon is a 59y F PMHx COPD and T2DM who presents to [place] for wound cares, therapies, and complex medical care. Pt transferred to [facility] from [facility] with PE, respiratory failure, CAP, and perineal abscess. Pt was extubated on 4/1 although she needed HFNC and still requires BiPAP at night intermittently. ID following and pt on zosyn for infection. Surgery followed the pt and performed I&D with penrose drain 3/31 with f/u I&D on 4/2 and 4/8. A wound vac was placed by surgery as well. Pt did well and was transferred to [facility] for ongoing cares.     [facility] Course  4/12: Stable.     Assessment & Plan      Acute Hypoxic Respiratory Failure  VAP, PTA  COPD, severe  ADEEL  - zosyn through 4/17  - inhalers as needed  - weaned off HFNC, now NC and Bipap at night  - RT following     Perineal Abscess  Condyloma Acuminata  - s/p acyclovir  - I&D x3  - zosyn through 4/17  - rectal tube in place for wound protection  - noguera for wound  - VAC in place  - WOC consult    PE, Acute, segmental, subsegmental  - apixaban     T2DM  - A1c 12.7  - lantus 25u daily  - I:C of 1:5 with meals  - ISS moderate    CKD3  - monitor Cr          Diet: Easy to Chew Diet (level 7) Thin Liquids (level 0) (NO STRAWS)  Snacks/Supplements Adult: Expedite Cup; With Meals  Snacks/Supplements Adult: Magic Cup; With Meals  Snacks/Supplements Adult: Gelatein 20 (sugar-free); With Meals    DVT Prophylaxis: DOAC  Noguera Catheter: PRESENT, indication: Wound deterioration and failed external collection device  Lines: PRESENT      CVC Triple Lumen Right Internal jugular-Site Assessment: WDL      Cardiac Monitoring: None  Code Status: Full Code      Clinically Significant Risk Factors Present on Admission                 # Drug Induced Platelet Defect: home medication list includes an antiplatelet medication   # Hypertension: Noted on problem list      #  "Anemia: based on hgb <11      # DMII: A1C = 12.7 % (Ref range: <5.7 %) within past 6 months   # Obesity: Estimated body mass index is 31.45 kg/m  as calculated from the following:    Height as of 3/22/25: 1.651 m (5' 5\").    Weight as of this encounter: 85.7 kg (189 lb).       # COPD: noted on problem list        Social Drivers of Health    Tobacco Use: Medium Risk (4/2/2025)    Patient History     Smoking Tobacco Use: Former     Smokeless Tobacco Use: Never     Passive Exposure: Current   Physical Activity: Unknown (5/16/2024)    Exercise Vital Sign     Days of Exercise per Week: 0 days   Social Connections: Unknown (5/16/2024)    Social Connection and Isolation Panel [NHANES]     Frequency of Social Gatherings with Friends and Family: More than three times a week          Disposition Plan     Medically Ready for Discharge: Anticipated in 5+ Days             Tomas Benitez MD  Hospitalist Service  LTACH  Securely message with [employer] (more info)  Text page via [employer] Paging/Directory   ______________________________________________________________________    Interval History   - no acute events ovn  - pt asleep in bed  - later on the phone  - appears comfortable  - no issues per RN  - no other acute concerns    Physical Exam   Vital Signs: Temp: 98.8  F (37.1  C) Temp src: Oral BP: (!) 143/65 Pulse: 93   Resp: 20 SpO2: 95 % O2 Device: Oxymask Oxygen Delivery: 4 LPM  Weight: 189 lbs 0 oz  Gen: awake, alert, normal appearing, comfortable  HEET: EOMI, MMM  CV: RRR  Pulm: CTAB  GI: soft, NT/ND  Integ: see WOC note for full assessment    Medical Decision Making       35 MINUTES SPENT BY ME on the date of service doing chart review, history, exam, documentation & further activities per the note.      Data         Imaging results reviewed over the past 24 hrs:   No results found for this or any previous visit (from the past 24 hours).  "

## 2025-04-12 NOTE — PLAN OF CARE
Problem: Adult Inpatient Plan of Care  Goal: Absence of Hospital-Acquired Illness or Injury  Outcome: Progressing  Intervention: Identify and Manage Fall Risk  Recent Flowsheet Documentation  Taken 4/12/2025 0043 by Katty Dumont RN  Safety Promotion/Fall Prevention:   activity supervised   room near nurse's station   safety round/check completed   lighting adjusted     Problem: Adult Inpatient Plan of Care  Goal: Optimal Comfort and Wellbeing  Outcome: Progressing  Intervention: Monitor Pain and Promote Comfort  Recent Flowsheet Documentation  Taken 4/11/2025 2130 by Katty Dumont RN  Pain Management Interventions: medication (see MAR)   Goal Outcome Evaluation:       Vital signs were stable. Earlier in the shift Pt. complained of right groin pain rating at 3 when asked, schedule tylenol given with effect. HS blood glucose was 136. Pt. was desating in the middle of the shift. RT put the Pt on BiPAP after trying to bump her O 2 thru oxy mask.Pt. asked to take the BIPAP off with in a hour. Pt. Is on 4L O 2 thru Oxy mask. Pt. was repositioned every 2 hours. Wound vac dressing was clean & intact. Continue to monitor  BP (!) 143/65 (BP Location: Left arm, Cuff Size: Adult Regular)   Pulse 93   Temp 98.8  F (37.1  C)   Resp 20   Wt 85.7 kg (189 lb)   SpO2 95%   BMI 31.45 kg/m    .   Katty Dumont RN

## 2025-04-12 NOTE — PLAN OF CARE
"  Problem: Adult Inpatient Plan of Care  Goal: Plan of Care Review  Description: The Plan of Care Review/Shift note should be completed every shift.  The Outcome Evaluation is a brief statement about your assessment that the patient is improving, declining, or no change.  This information will be displayed automatically on your shiftnote.  Outcome: Progressing  Goal: Patient-Specific Goal (Individualized)  Description: You can add care plan individualizations to a care plan. Examples of Individualization might be:  \"Parent requests to be called daily at 9am for status\", \"I have a hard time hearing out of my right ear\", or \"Do not touch me to wake me up as it startlesme\".  Outcome: Progressing  Goal: Absence of Hospital-Acquired Illness or Injury  Outcome: Progressing  Intervention: Prevent Skin Injury  Recent Flowsheet Documentation  Taken 4/12/2025 0946 by Jazmin So RN  Body Position: sitting up in bed  Goal: Optimal Comfort and Wellbeing  Outcome: Progressing  Goal: Readiness for Transition of Care  Outcome: Progressing     Problem: Pain Acute  Goal: Optimal Pain Control and Function  Outcome: Progressing  Intervention: Optimize Psychosocial Wellbeing  Recent Flowsheet Documentation  Taken 4/12/2025 0946 by Jazmin So RN  Diversional Activities: television     Problem: Wound  Goal: Optimal Coping  Outcome: Progressing  Intervention: Support Patient and Family Response  Recent Flowsheet Documentation  Taken 4/12/2025 0946 by Jazmin So RN  Family/Support System Care: (not t bedside) other (see comments)  Goal: Optimal Functional Ability  Outcome: Progressing  Intervention: Optimize Functional Ability  Recent Flowsheet Documentation  Taken 4/12/2025 0946 by Jazmin So RN  Activity Management: activity adjusted per tolerance  Activity Assistance Provided: assistance, 1 person  Goal: Absence of Infection Signs and Symptoms  Outcome: Progressing  Goal: Improved Oral Intake  Outcome: " Progressing  Goal: Optimal Pain Control and Function  Outcome: Progressing  Goal: Skin Health and Integrity  Outcome: Progressing  Intervention: Optimize Skin Protection  Recent Flowsheet Documentation  Taken 4/12/2025 0946 by Jazmin So RN  Activity Management: activity adjusted per tolerance  Head of Bed (HOB) Positioning: HOB at 90 degrees  Goal: Optimal Wound Healing  Outcome: Progressing     Problem: Infection  Goal: Absence of Infection Signs and Symptoms  Outcome: Progressing   Goal Outcome Evaluation:             Patient alert and oriented x4 with minor forgetfulness, able to communicate needs effectively. Temp: 99.3  F (37.4  C) Temp src: Axillary BP: 125/60 Pulse: 92   Resp: 18 SpO2: 94 % O2 Device: Nasal cannula Oxygen Delivery: 4 LPM, no shortness of breath noted. Patient has good appetite, ate 100% of meals. Landrum catheter is patent, draining clear yellow urine, rectal tube in place, small amount of stool noted in the tubing but none in the bag this shift,will address with MD. Wound cares done to right labia wound,denied having any pain or discomfort.    Jazmin So, RN

## 2025-04-12 NOTE — CONSULTS
"CLINICAL NUTRITION SERVICES - ASSESSMENT NOTE    RECOMMENDATIONS FOR MDs/PROVIDERS TO ORDER:  None    Registered Dietitian Interventions:  Medical food supplement therapy - continue current ONS regimen    Future/Additional Recommendations:  Monitor po intake, ONS needs, BG, wound healing     REASON FOR ASSESSMENT  Provider order - Nutrition Assessment    INFORMATION OBTAINED  Assessed patient over the phone. Pt reports eating 100% of meals this past week. She enjoys the supplements she's receiving, and is consuming all of them. Pt has no questions or concerns related to nutrition at this time.    NUTRITION HISTORY  Dx/PMH: acute hypoxic resp failure, perineal abscess with wound vac, PE, DM2, CKD3  Pt on TF 3/25-3/31. NPO 3/31-. Started po diet on .     CURRENT NUTRITION ORDERS  Diet: Level 7: Easy to Chew Dysphagia Diet  Snacks/Supplements: Gelatein 20 daily with breakfast, Magic Cup daily with lunch, and Expedite bottle      CURRENT INTAKE/TOLERANCE  Pt ate 100% of breakfast: [place]ian toast, banana, yogurt, OJ, milk: 960 kcal, 30 g PRO  Breakfast meal provided 10 carb choices.    LABS  Nutrition-relevant labs: Reviewed  BUN: 28 (H), Cr: 1.18 (H), B-192 (H)   BG >180 following dinner and breakfast meals.    MEDICATIONS  Nutrition-relevant medications: Reviewed  Novolog, Lantus    ANTHROPOMETRICS  Height: 165.1 cm (5' 5\")  Admission Weight: 85.7 kg (189 lb) (25 0609)   Most Recent Weight: 85.7 kg (189 lb) (25 0609)  IBW: 57 kg  BMI: Body mass index is 31.45 kg/m .   Weight History:   25 : 85.7 kg (189 lb)  25 : 101.3 kg (223 lb 5.2 oz)  25 : 109.4 kg (241 lb 2.9 oz)  25 : 88.1 kg (194 lb 3.6 oz) - baseline wt?  25 : 86 kg (189 lb 11.2 oz)  25 : 92.5 kg (204 lb)  24 : 92.9 kg (204 lb 12.5 oz)  24 : 91.4 kg (201 lb 8 oz)  10/30/24 : 91.4 kg (201 lb 9.6 oz)  24 : 90.4 kg (199 lb 4.7 oz)  Pt's wt is down from fluid loss, per previous RD  2-3+ " generalized edema noted on 3/31, but WDL now.    Dosing Weight: 64 kg, based on adjusted wt    ASSESSED NUTRITION NEEDS  Estimated Energy Needs: 0064-0827 kcals/day (20 - 25 kcals/kg)  Justification: Obese  Estimated Protein Needs: 76-96 grams protein/day (1.2 - 1.5 grams of pro/kg)  Justification: Wound healing  Estimated Fluid Needs: 6767-1161 mL/day (1 mL/kcal)  Justification: Maintenance    SYSTEM AND PHYSICAL FINDINGS    GI symptoms: Reviewed LBM on 4/12: diarrhea  Skin/wounds: Reviewed right groin wound (surgical wound) with wound vac, bilateral buttocks PI (stage 2) . S/p irrigation and debridement wound, right groin and labia on 4/8/2025  Edema: WDL    MALNUTRITION  % Intake: Decreased intake does not meet criteria  % Weight Loss: Weight loss does not meet criteria   Subcutaneous Fat Loss: Unable to assess  Muscle Loss: Unable to assess  Fluid Accumulation/Edema: None noted  Malnutrition Diagnosis: Unable to determine due to lack of NFPE  Malnutrition Present on Admission: No    NUTRITION DIAGNOSIS  Increased nutrient needs (protein)  related to wound healing as evidenced by 1.2-1.5 g/kg protein/day    INTERVENTIONS  Medical food supplement therapy - continue current ONS regimen    GOALS  Blood glucose 140-180 mg/dL  Patient to consume % of nutritionally adequate meal trays TID, or the equivalent with supplements/snacks.  Wound healing per WOC documentation     MONITORING/EVALUATION  Progress toward goals will be monitored and evaluated per policy.

## 2025-04-13 LAB
ALBUMIN SERPL BCG-MCNC: 2.9 G/DL (ref 3.5–5.2)
ALP SERPL-CCNC: 71 U/L (ref 40–150)
ALT SERPL W P-5'-P-CCNC: 8 U/L (ref 0–50)
ANION GAP SERPL CALCULATED.3IONS-SCNC: 7 MMOL/L (ref 7–15)
AST SERPL W P-5'-P-CCNC: 12 U/L (ref 0–45)
BASE EXCESS BLDV CALC-SCNC: 7.9 MMOL/L (ref -3–3)
BASOPHILS # BLD AUTO: 0.1 10E3/UL (ref 0–0.2)
BASOPHILS NFR BLD AUTO: 1 %
BILIRUB SERPL-MCNC: 0.2 MG/DL
BUN SERPL-MCNC: 40.9 MG/DL (ref 8–23)
CA-I BLD-MCNC: 5 MG/DL (ref 4.4–5.2)
CALCIUM SERPL-MCNC: 9.2 MG/DL (ref 8.8–10.4)
CHLORIDE SERPL-SCNC: 98 MMOL/L (ref 98–107)
CREAT SERPL-MCNC: 1.66 MG/DL (ref 0.51–0.95)
EGFRCR SERPLBLD CKD-EPI 2021: 35 ML/MIN/1.73M2
EOSINOPHIL # BLD AUTO: 0.4 10E3/UL (ref 0–0.7)
EOSINOPHIL NFR BLD AUTO: 7 %
ERYTHROCYTE [DISTWIDTH] IN BLOOD BY AUTOMATED COUNT: 13.7 % (ref 10–15)
GLUCOSE BLD-MCNC: 241 MG/DL (ref 70–99)
GLUCOSE BLDC GLUCOMTR-MCNC: 130 MG/DL (ref 70–99)
GLUCOSE BLDC GLUCOMTR-MCNC: 196 MG/DL (ref 70–99)
GLUCOSE BLDC GLUCOMTR-MCNC: 225 MG/DL (ref 70–99)
GLUCOSE BLDC GLUCOMTR-MCNC: 230 MG/DL (ref 70–99)
GLUCOSE BLDC GLUCOMTR-MCNC: 255 MG/DL (ref 70–99)
GLUCOSE SERPL-MCNC: 245 MG/DL (ref 70–99)
HCO3 BLDV-SCNC: 32 MMOL/L (ref 21–28)
HCO3 SERPL-SCNC: 32 MMOL/L (ref 22–29)
HCT VFR BLD AUTO: 32.7 % (ref 35–47)
HGB BLD-MCNC: 10.5 G/DL (ref 11.7–15.7)
HGB BLD-MCNC: 11 G/DL (ref 11.7–15.7)
IMM GRANULOCYTES # BLD: 0 10E3/UL
IMM GRANULOCYTES NFR BLD: 1 %
LACTATE BLD-SCNC: 1.4 MMOL/L (ref 0.7–2)
LYMPHOCYTES # BLD AUTO: 1.7 10E3/UL (ref 0.8–5.3)
LYMPHOCYTES NFR BLD AUTO: 27 %
MCH RBC QN AUTO: 30 PG (ref 26.5–33)
MCHC RBC AUTO-ENTMCNC: 32.1 G/DL (ref 31.5–36.5)
MCV RBC AUTO: 93 FL (ref 78–100)
MONOCYTES # BLD AUTO: 1.1 10E3/UL (ref 0–1.3)
MONOCYTES NFR BLD AUTO: 17 %
NEUTROPHILS # BLD AUTO: 2.9 10E3/UL (ref 1.6–8.3)
NEUTROPHILS NFR BLD AUTO: 47 %
NRBC # BLD AUTO: 0 10E3/UL
NRBC BLD AUTO-RTO: 0 /100
OXYHGB MFR BLDV: 84 % (ref 70–75)
PCO2 BLDV: 53 MM HG (ref 40–50)
PH BLDV: 7.41 [PH] (ref 7.32–7.43)
PLATELET # BLD AUTO: 411 10E3/UL (ref 150–450)
PO2 BLDV: 49 MM HG (ref 25–47)
POTASSIUM BLD-SCNC: 4.7 MMOL/L (ref 3.4–5.3)
POTASSIUM SERPL-SCNC: 5.2 MMOL/L (ref 3.4–5.3)
PROT SERPL-MCNC: 6.2 G/DL (ref 6.4–8.3)
RBC # BLD AUTO: 3.5 10E6/UL (ref 3.8–5.2)
SAO2 % BLDV: 85 % (ref 70–75)
SODIUM BLD-SCNC: 137 MMOL/L (ref 135–145)
SODIUM SERPL-SCNC: 137 MMOL/L (ref 135–145)
WBC # BLD AUTO: 6.3 10E3/UL (ref 4–11)

## 2025-04-13 PROCEDURE — 250N000009 HC RX 250: Performed by: STUDENT IN AN ORGANIZED HEALTH CARE EDUCATION/TRAINING PROGRAM

## 2025-04-13 PROCEDURE — 82040 ASSAY OF SERUM ALBUMIN: CPT | Performed by: INTERNAL MEDICINE

## 2025-04-13 PROCEDURE — 85025 COMPLETE CBC W/AUTO DIFF WBC: CPT | Performed by: INTERNAL MEDICINE

## 2025-04-13 PROCEDURE — 99232 SBSQ HOSP IP/OBS MODERATE 35: CPT | Performed by: STUDENT IN AN ORGANIZED HEALTH CARE EDUCATION/TRAINING PROGRAM

## 2025-04-13 PROCEDURE — 250N000011 HC RX IP 250 OP 636: Performed by: STUDENT IN AN ORGANIZED HEALTH CARE EDUCATION/TRAINING PROGRAM

## 2025-04-13 PROCEDURE — 94640 AIRWAY INHALATION TREATMENT: CPT | Mod: 76

## 2025-04-13 PROCEDURE — 80051 ELECTROLYTE PANEL: CPT

## 2025-04-13 PROCEDURE — 94640 AIRWAY INHALATION TREATMENT: CPT

## 2025-04-13 PROCEDURE — 94660 CPAP INITIATION&MGMT: CPT

## 2025-04-13 PROCEDURE — 120N000017 HC R&B RESPIRATORY CARE

## 2025-04-13 PROCEDURE — 250N000013 HC RX MED GY IP 250 OP 250 PS 637: Performed by: STUDENT IN AN ORGANIZED HEALTH CARE EDUCATION/TRAINING PROGRAM

## 2025-04-13 PROCEDURE — 999N000157 HC STATISTIC RCP TIME EA 10 MIN

## 2025-04-13 RX ADMIN — ALBUTEROL SULFATE 2.5 MG: 2.5 SOLUTION RESPIRATORY (INHALATION) at 07:44

## 2025-04-13 RX ADMIN — ACETAMINOPHEN 975 MG: 325 TABLET, FILM COATED ORAL at 21:38

## 2025-04-13 RX ADMIN — INSULIN ASPART 5 UNITS: 100 INJECTION, SOLUTION INTRAVENOUS; SUBCUTANEOUS at 08:20

## 2025-04-13 RX ADMIN — ALBUTEROL SULFATE 2.5 MG: 2.5 SOLUTION RESPIRATORY (INHALATION) at 19:16

## 2025-04-13 RX ADMIN — PIPERACILLIN AND TAZOBACTAM 3.38 G: 3; .375 INJECTION, POWDER, FOR SOLUTION INTRAVENOUS at 05:57

## 2025-04-13 RX ADMIN — ACETAMINOPHEN 975 MG: 325 TABLET, FILM COATED ORAL at 05:57

## 2025-04-13 RX ADMIN — AMLODIPINE BESYLATE 5 MG: 5 TABLET ORAL at 08:20

## 2025-04-13 RX ADMIN — INSULIN GLARGINE 25 UNITS: 100 INJECTION, SOLUTION SUBCUTANEOUS at 21:40

## 2025-04-13 RX ADMIN — INSULIN ASPART 8 UNITS: 100 INJECTION, SOLUTION INTRAVENOUS; SUBCUTANEOUS at 17:46

## 2025-04-13 RX ADMIN — ACETAMINOPHEN 975 MG: 325 TABLET, FILM COATED ORAL at 13:43

## 2025-04-13 RX ADMIN — APIXABAN 5 MG: 5 TABLET, FILM COATED ORAL at 08:20

## 2025-04-13 RX ADMIN — SODIUM CHLORIDE SOLN NEBU 3% 3 ML: 3 NEBU SOLN at 07:44

## 2025-04-13 RX ADMIN — PIPERACILLIN AND TAZOBACTAM 3.38 G: 3; .375 INJECTION, POWDER, FOR SOLUTION INTRAVENOUS at 21:41

## 2025-04-13 RX ADMIN — INSULIN ASPART 24 UNITS: 100 INJECTION, SOLUTION INTRAVENOUS; SUBCUTANEOUS at 09:14

## 2025-04-13 RX ADMIN — APIXABAN 5 MG: 5 TABLET, FILM COATED ORAL at 21:39

## 2025-04-13 RX ADMIN — INSULIN ASPART 9 UNITS: 100 INJECTION, SOLUTION INTRAVENOUS; SUBCUTANEOUS at 12:10

## 2025-04-13 RX ADMIN — PIPERACILLIN AND TAZOBACTAM 3.38 G: 3; .375 INJECTION, POWDER, FOR SOLUTION INTRAVENOUS at 13:44

## 2025-04-13 RX ADMIN — ALBUTEROL SULFATE 2.5 MG: 2.5 SOLUTION RESPIRATORY (INHALATION) at 13:59

## 2025-04-13 RX ADMIN — ATORVASTATIN CALCIUM 40 MG: 40 TABLET, FILM COATED ORAL at 21:38

## 2025-04-13 RX ADMIN — INSULIN ASPART 3 UNITS: 100 INJECTION, SOLUTION INTRAVENOUS; SUBCUTANEOUS at 12:10

## 2025-04-13 RX ADMIN — SODIUM CHLORIDE SOLN NEBU 3% 3 ML: 3 NEBU SOLN at 19:16

## 2025-04-13 RX ADMIN — UMECLIDINIUM BROMIDE AND VILANTEROL TRIFENATATE 1 PUFF: 62.5; 25 POWDER RESPIRATORY (INHALATION) at 08:21

## 2025-04-13 ASSESSMENT — ACTIVITIES OF DAILY LIVING (ADL)
ADLS_ACUITY_SCORE: 64
ADLS_ACUITY_SCORE: 66
ADLS_ACUITY_SCORE: 65
ADLS_ACUITY_SCORE: 64
ADLS_ACUITY_SCORE: 66
ADLS_ACUITY_SCORE: 69
ADLS_ACUITY_SCORE: 69
ADLS_ACUITY_SCORE: 65
ADLS_ACUITY_SCORE: 65
ADLS_ACUITY_SCORE: 69
ADLS_ACUITY_SCORE: 65
ADLS_ACUITY_SCORE: 66
ADLS_ACUITY_SCORE: 69
ADLS_ACUITY_SCORE: 65
ADLS_ACUITY_SCORE: 64
ADLS_ACUITY_SCORE: 65

## 2025-04-13 NOTE — PLAN OF CARE
INITIAL 3 NIGHTS BIPAP/CPAP NOTE    UNIT TYPE:       V60  MASK TYPE:     Small Face Mask    SETTINGS:      MODE:    ST Mode   IPAP:       18   EPAP:      10   RATE:      16   FI02 :       30%     TIME OF MASK PLACEMENT:  01:35    TWO HOUR SKIN CHECK DONE AT: 04:23  No skin issue noted, used Liquicell skin barrier protection on bridge of nose.    INTERVENTION INITIATED:  No intervention necessary at this time.    PATIENT'S TOLERANCE OF DEVICE:  Approached patient early on the shift for BiPAP use. Patient initially declined BiPAP twice.  Placed 4 L of Oxymask. Patient started to have multiple episodes of apnea when in deep sleep. RT (writer) and RN tried waking up patient to encourage to use BiPAP and she appeared very heavy sleeper. BiPAP was placed at 01:35 and have been tolerating well.  Patient could not tolerate the initial setting of 22/18, current on 18/10 and tolerating well.  VBG done at the time is Venous Blood Gas  Recent Labs   Lab 04/13/25  0144   PHV 7.41   PCO2V 53*   PO2V 49*   HCO3V 32*   JOSE ARMANDO 7.9*   RT will continue to follow and monitor.

## 2025-04-13 NOTE — PROGRESS NOTES
[facility]    Medicine Progress Note - Hospitalist Service    Date of Admission:  4/11/2025    Hospital Course  Marly Cannon is a 59y F PMHx COPD and T2DM who presents to [facility] for wound cares, therapies, and complex medical care. Pt transferred to [facility] from [facility] with PE, respiratory failure, CAP, and perineal abscess. Pt was extubated on 4/1 although she needed HFNC and still requires BiPAP at night intermittently. ID following and pt on zosyn for infection. Surgery followed the pt and performed I&D with penrose drain 3/31 with f/u I&D on 4/2 and 4/8. A wound vac was placed by surgery as well. Pt did well and was transferred to [facility] for ongoing cares.     [facility] Course  4/12-4/13: Needing BiPAP ovn. BG labile, may need changes to regimen in coming week.     Assessment & Plan      Acute Hypoxic Respiratory Failure  VAP, PTA  COPD, severe  ADEEL  - zosyn through 4/17  - inhalers as needed  - weaned off HFNC, now NC and Bipap at night  - RT following     Perineal Abscess  Condyloma Acuminata  - s/p acyclovir  - I&D x3  - zosyn through 4/17  - rectal tube in place for wound protection  - noguera for wound  - VAC in place  - WOC consult    PE, Acute, segmental, subsegmental  - apixaban     T2DM  - A1c 12.7  - BG labile since admission. May consider decreasing I:C ratio vs splitting lantus (favor former)  - lantus 25u daily  - I:C of 1:5 with meals  - ISS moderate    CKD3  - monitor Cr          Diet: Snacks/Supplements Adult: Expedite Cup; With Meals  Snacks/Supplements Adult: Magic Cup; With Meals  Snacks/Supplements Adult: Gelatein 20 (sugar-free); With Meals  Combination Diet Moderate Consistent Carb (60 g CHO per Meal) Diet; Easy to Chew (level 7); Thin Liquids (level 0)    DVT Prophylaxis: DOAC  Noguera Catheter: PRESENT, indication: Wound deterioration and failed external collection device  Lines: None       Cardiac Monitoring: None  Code Status: Full Code      Clinically Significant Risk Factors               #  "Hypoalbuminemia: Lowest albumin = 2.9 g/dL at 4/13/2025  2:14 AM, will monitor as appropriate     # Hypertension: Noted on problem list           # DMII: A1C = 12.7 % (Ref range: <5.7 %) within past 6 months   # Obesity: Estimated body mass index is 30.47 kg/m  as calculated from the following:    Height as of 3/22/25: 1.651 m (5' 5\").    Weight as of this encounter: 83.1 kg (183 lb 1.6 oz)., PRESENT ON ADMISSION     # COPD: noted on problem list        Social Drivers of Health    Tobacco Use: Medium Risk (4/2/2025)    Patient History     Smoking Tobacco Use: Former     Smokeless Tobacco Use: Never     Passive Exposure: Current   Physical Activity: Unknown (5/16/2024)    Exercise Vital Sign     Days of Exercise per Week: 0 days   Social Connections: Unknown (5/16/2024)    Social Connection and Isolation Panel [NHANES]     Frequency of Social Gatherings with Friends and Family: More than three times a week          Disposition Plan     Medically Ready for Discharge: Anticipated in 5+ Days             Tomas Benitez MD  Hospitalist Service  LTACH  Securely message with [employer] (more info)  Text page via [email or web address] Paging/Directory   ______________________________________________________________________    Interval History   - ovn pt desatted and became difficult to arouse while sleeping, placed on BiPAP with improvement, VBG not terrible  - pt sitting up in bed eating pancakes  - feels fine today  - RN noted BG elevations, discussed switching to moderate-carb diet and possibly insulin although not today  - removed neuro-checks from ovn  - no other acute concerns    Physical Exam   Vital Signs: Temp: 97.7  F (36.5  C) Temp src: Oral BP: 124/81 Pulse: 86   Resp: 20 SpO2: 97 % O2 Device: Nasal cannula Oxygen Delivery: 4 LPM  Weight: 183 lbs 1.61 oz  Gen: awake, alert, normal appearing, comfortable  HEET: EOMI, MMM  CV: RRR  Pulm: CTAB  GI: soft, NT/ND  Integ: see WOC note for full assessment    Medical Decision Making       35 " MINUTES SPENT BY ME on the date of service doing chart review, history, exam, documentation & further activities per the note.      Data     I have personally reviewed the following data over the past 24 hrs:    6.3  \   10.5 (L)   / 411     137 98 40.9 (H) /  255 (H)   5.2 32 (H) 1.66 (H) \     ALT: 8 AST: 12 AP: 71 TBILI: 0.2   ALB: 2.9 (L) TOT PROTEIN: 6.2 (L) LIPASE: N/A     Procal: N/A CRP: N/A Lactic Acid: 1.4         Imaging results reviewed over the past 24 hrs:   No results found for this or any previous visit (from the past 24 hours).

## 2025-04-13 NOTE — PLAN OF CARE
Problem: Adult Inpatient Plan of Care  Goal: Absence of Hospital-Acquired Illness or Injury  4/13/2025 0516 by Katty Dumont RN  Outcome: Progressing  4/13/2025 0511 by Katty Dumont RN  Outcome: Progressing  Intervention: Identify and Manage Fall Risk  Recent Flowsheet Documentation  Taken 4/13/2025 0253 by Katty Dumont RN  Safety Promotion/Fall Prevention:   activity supervised   room near nurse's station   safety round/check completed   lighting adjusted  Taken 4/12/2025 2027 by Katty Dumont RN  Safety Promotion/Fall Prevention:   activity supervised   room near nurse's station   safety round/check completed   lighting adjusted     Problem: Adult Inpatient Plan of Care  Goal: Optimal Comfort and Wellbeing  Outcome: Progressing  Intervention: Monitor Pain and Promote Comfort  Recent Flowsheet Documentation  Taken 4/12/2025 2128 by Katty Dumont RN  Pain Management Interventions: medication (see MAR)   Goal Outcome Evaluation:       Vital signs were stable. Pt. complained of right groin pain rating at 4, schedule tylenol given with effect. Pt. refused BIPAP at HS. Pt. was educated on BIPAP but still refused. Around 1:30 am Pt's O2 sat was in lower 70's on Oxymask 4L O2, but rise back in 90's with in few seconds. Pt. was in deep sleep and had to rub sternally to wake her up. Vital signs were stable, BG was 230. Pt. was wide awake  with in few minutes.Pt. agreed for BIPAP. MD was informed. MD assess the Pt. and ordered labs. Neuro checks are done as per orders. No BM in the rectal tube, smear BM noted X 2 in the shift. Wound vac dressing was clean, dry & intact. Right labia dressing was changed as it was soiled with BM.Continue to monitor.  /81 (BP Location: Left arm)   Pulse 86   Temp 97.7  F (36.5  C) (Oral)   Resp 20   Wt 83.1 kg (183 lb 1.6 oz)   SpO2 97%   BMI 30.47 kg/m      Katty Dumont RN

## 2025-04-13 NOTE — SIGNIFICANT EVENT
Cross cover - significant Event Note - O2 sat dropped while on 4L of O2, and required stimulation to wake her up, now on BiPAP    Wide awake when I met her while on BiPAP.  She was sleeping when O2 sat dropped.  Denies any major complaints, alert and following commands  Pupils 2-3 mm EBRTL, EOMS full and equal, no nystagmus. No visual complaints, can count fingers. Moving all extremities, able to do hand .     STAT glucose over 200    Assessment - Has ADEEL and severe COPD and required prolonged intubation before getting extubated - off and on HFNC or BiPAP since, but notes show she had been refusing BiPAP. Now on BiPAP and breathing comfortably sat 98% on 40% FIO2. Mental status improved significantly after stimulation. RN reported she is back to her baseline and reported she is a deep sleeper.       Plans  - Check CBC/CMP/VBG   - Neuro checks

## 2025-04-13 NOTE — PLAN OF CARE
"  Problem: Adult Inpatient Plan of Care  Goal: Plan of Care Review  Description: The Plan of Care Review/Shift note should be completed every shift.  The Outcome Evaluation is a brief statement about your assessment that the patient is improving, declining, or no change.  This information will be displayed automatically on your shiftnote.  Outcome: Progressing  Goal: Patient-Specific Goal (Individualized)  Description: You can add care plan individualizations to a care plan. Examples of Individualization might be:  \"Parent requests to be called daily at 9am for status\", \"I have a hard time hearing out of my right ear\", or \"Do not touch me to wake me up as it startlesme\".  Outcome: Progressing  Goal: Absence of Hospital-Acquired Illness or Injury  Outcome: Progressing  Goal: Optimal Comfort and Wellbeing  Outcome: Progressing  Goal: Readiness for Transition of Care  Outcome: Progressing     Problem: Pain Acute  Goal: Optimal Pain Control and Function  Outcome: Progressing  Intervention: Optimize Psychosocial Wellbeing  Recent Flowsheet Documentation  Taken 4/13/2025 0900 by Jazmin So RN  Diversional Activities: television     Problem: Wound  Goal: Optimal Coping  Outcome: Progressing  Intervention: Support Patient and Family Response  Recent Flowsheet Documentation  Taken 4/13/2025 0900 by Jazmin So RN  Family/Support System Care: (not t bedside) other (see comments)  Goal: Optimal Functional Ability  Outcome: Progressing  Intervention: Optimize Functional Ability  Recent Flowsheet Documentation  Taken 4/13/2025 0900 by Jazmin So RN  Activity Management: activity adjusted per tolerance  Activity Assistance Provided: assistance, 1 person  Goal: Absence of Infection Signs and Symptoms  Outcome: Progressing  Goal: Improved Oral Intake  Outcome: Progressing  Goal: Optimal Pain Control and Function  Outcome: Progressing  Goal: Skin Health and Integrity  Outcome: Progressing  Intervention: " Optimize Skin Protection  Recent Flowsheet Documentation  Taken 4/13/2025 0900 by Jazmin So RN  Activity Management: activity adjusted per tolerance  Goal: Optimal Wound Healing  Outcome: Progressing     Problem: Infection  Goal: Absence of Infection Signs and Symptoms  Outcome: Progressing   Goal Outcome Evaluation:         Patient presented as calm, pleasant and cooperative alert and oriented x4. Able to communicate need appropriately this shift. Temp: 97.7  F (36.5  C) Temp src: Oral BP: 124/81 Pulse: 86   Resp: 20 SpO2: 97 % O2 Device: Nasal cannula Oxygen Delivery: 4 LPM Patient's rectal tube has had no output for >24 hours, tube discontinued. Landrum is patent, draining clear yellow urine. Good food and fluid intake, denied having any pain or discomfort.    Jazmin So, RN

## 2025-04-13 NOTE — PLAN OF CARE
Patient is currently resting with 4 L of Oxymask and tolerating well.  Refused BiPAP tonight. Schedule nebs given per order.  Blood pressure 124/60, pulse 98, temperature 98.9  F (37.2  C), temperature source Axillary, resp. rate 24, weight 85.7 kg (189 lb), SpO2 98%.  RT will continue to follow and monitor.

## 2025-04-14 ENCOUNTER — APPOINTMENT (OUTPATIENT)
Dept: PHYSICAL THERAPY | Facility: CLINIC | Age: 60
End: 2025-04-14
Attending: STUDENT IN AN ORGANIZED HEALTH CARE EDUCATION/TRAINING PROGRAM
Payer: COMMERCIAL

## 2025-04-14 ENCOUNTER — APPOINTMENT (OUTPATIENT)
Dept: OCCUPATIONAL THERAPY | Facility: CLINIC | Age: 60
DRG: 602 | End: 2025-04-14
Attending: STUDENT IN AN ORGANIZED HEALTH CARE EDUCATION/TRAINING PROGRAM
Payer: COMMERCIAL

## 2025-04-14 LAB
ANION GAP SERPL CALCULATED.3IONS-SCNC: 7 MMOL/L (ref 7–15)
BUN SERPL-MCNC: 40.6 MG/DL (ref 8–23)
CALCIUM SERPL-MCNC: 9.4 MG/DL (ref 8.8–10.4)
CHLORIDE SERPL-SCNC: 100 MMOL/L (ref 98–107)
CREAT SERPL-MCNC: 1.31 MG/DL (ref 0.51–0.95)
EGFRCR SERPLBLD CKD-EPI 2021: 46 ML/MIN/1.73M2
ERYTHROCYTE [DISTWIDTH] IN BLOOD BY AUTOMATED COUNT: 13.9 % (ref 10–15)
GLUCOSE BLDC GLUCOMTR-MCNC: 133 MG/DL (ref 70–99)
GLUCOSE BLDC GLUCOMTR-MCNC: 235 MG/DL (ref 70–99)
GLUCOSE BLDC GLUCOMTR-MCNC: 278 MG/DL (ref 70–99)
GLUCOSE BLDC GLUCOMTR-MCNC: 282 MG/DL (ref 70–99)
GLUCOSE BLDC GLUCOMTR-MCNC: 374 MG/DL (ref 70–99)
GLUCOSE SERPL-MCNC: 231 MG/DL (ref 70–99)
HCO3 SERPL-SCNC: 32 MMOL/L (ref 22–29)
HCT VFR BLD AUTO: 34.3 % (ref 35–47)
HGB BLD-MCNC: 10.8 G/DL (ref 11.7–15.7)
MAGNESIUM SERPL-MCNC: 1.8 MG/DL (ref 1.7–2.3)
MCH RBC QN AUTO: 30.8 PG (ref 26.5–33)
MCHC RBC AUTO-ENTMCNC: 31.5 G/DL (ref 31.5–36.5)
MCV RBC AUTO: 98 FL (ref 78–100)
PLATELET # BLD AUTO: 512 10E3/UL (ref 150–450)
POTASSIUM SERPL-SCNC: 4.9 MMOL/L (ref 3.4–5.3)
RBC # BLD AUTO: 3.51 10E6/UL (ref 3.8–5.2)
SODIUM SERPL-SCNC: 139 MMOL/L (ref 135–145)
WBC # BLD AUTO: 5.2 10E3/UL (ref 4–11)

## 2025-04-14 PROCEDURE — 94640 AIRWAY INHALATION TREATMENT: CPT | Mod: 76

## 2025-04-14 PROCEDURE — 250N000013 HC RX MED GY IP 250 OP 250 PS 637: Performed by: STUDENT IN AN ORGANIZED HEALTH CARE EDUCATION/TRAINING PROGRAM

## 2025-04-14 PROCEDURE — 80048 BASIC METABOLIC PNL TOTAL CA: CPT | Performed by: STUDENT IN AN ORGANIZED HEALTH CARE EDUCATION/TRAINING PROGRAM

## 2025-04-14 PROCEDURE — 97165 OT EVAL LOW COMPLEX 30 MIN: CPT | Mod: GO | Performed by: OCCUPATIONAL THERAPIST

## 2025-04-14 PROCEDURE — 120N000017 HC R&B RESPIRATORY CARE

## 2025-04-14 PROCEDURE — 999N000157 HC STATISTIC RCP TIME EA 10 MIN

## 2025-04-14 PROCEDURE — 999N000123 HC STATISTIC OXYGEN O2DAILY TECH TIME

## 2025-04-14 PROCEDURE — 97530 THERAPEUTIC ACTIVITIES: CPT | Mod: GP | Performed by: PHYSICAL THERAPIST

## 2025-04-14 PROCEDURE — 250N000011 HC RX IP 250 OP 636: Performed by: STUDENT IN AN ORGANIZED HEALTH CARE EDUCATION/TRAINING PROGRAM

## 2025-04-14 PROCEDURE — 36415 COLL VENOUS BLD VENIPUNCTURE: CPT | Performed by: STUDENT IN AN ORGANIZED HEALTH CARE EDUCATION/TRAINING PROGRAM

## 2025-04-14 PROCEDURE — 97162 PT EVAL MOD COMPLEX 30 MIN: CPT | Mod: GP | Performed by: PHYSICAL THERAPIST

## 2025-04-14 PROCEDURE — 94640 AIRWAY INHALATION TREATMENT: CPT

## 2025-04-14 PROCEDURE — 99232 SBSQ HOSP IP/OBS MODERATE 35: CPT | Performed by: STUDENT IN AN ORGANIZED HEALTH CARE EDUCATION/TRAINING PROGRAM

## 2025-04-14 PROCEDURE — 83735 ASSAY OF MAGNESIUM: CPT | Performed by: STUDENT IN AN ORGANIZED HEALTH CARE EDUCATION/TRAINING PROGRAM

## 2025-04-14 PROCEDURE — 250N000009 HC RX 250: Performed by: STUDENT IN AN ORGANIZED HEALTH CARE EDUCATION/TRAINING PROGRAM

## 2025-04-14 PROCEDURE — 97110 THERAPEUTIC EXERCISES: CPT | Mod: GP | Performed by: PHYSICAL THERAPIST

## 2025-04-14 PROCEDURE — 94660 CPAP INITIATION&MGMT: CPT

## 2025-04-14 PROCEDURE — 97110 THERAPEUTIC EXERCISES: CPT | Mod: GO | Performed by: OCCUPATIONAL THERAPIST

## 2025-04-14 RX ADMIN — INSULIN ASPART 18 UNITS: 100 INJECTION, SOLUTION INTRAVENOUS; SUBCUTANEOUS at 12:42

## 2025-04-14 RX ADMIN — ACETAMINOPHEN 975 MG: 325 TABLET, FILM COATED ORAL at 05:55

## 2025-04-14 RX ADMIN — ACETAMINOPHEN 975 MG: 325 TABLET, FILM COATED ORAL at 21:03

## 2025-04-14 RX ADMIN — ALBUTEROL SULFATE 2.5 MG: 2.5 SOLUTION RESPIRATORY (INHALATION) at 19:05

## 2025-04-14 RX ADMIN — PIPERACILLIN AND TAZOBACTAM 3.38 G: 3; .375 INJECTION, POWDER, FOR SOLUTION INTRAVENOUS at 21:16

## 2025-04-14 RX ADMIN — INSULIN GLARGINE 30 UNITS: 100 INJECTION, SOLUTION SUBCUTANEOUS at 20:54

## 2025-04-14 RX ADMIN — INSULIN ASPART 11 UNITS: 100 INJECTION, SOLUTION INTRAVENOUS; SUBCUTANEOUS at 17:31

## 2025-04-14 RX ADMIN — PIPERACILLIN AND TAZOBACTAM 3.38 G: 3; .375 INJECTION, POWDER, FOR SOLUTION INTRAVENOUS at 05:55

## 2025-04-14 RX ADMIN — UMECLIDINIUM BROMIDE AND VILANTEROL TRIFENATATE 1 PUFF: 62.5; 25 POWDER RESPIRATORY (INHALATION) at 08:17

## 2025-04-14 RX ADMIN — ALBUTEROL SULFATE 2.5 MG: 2.5 SOLUTION RESPIRATORY (INHALATION) at 13:13

## 2025-04-14 RX ADMIN — INSULIN ASPART 11 UNITS: 100 INJECTION, SOLUTION INTRAVENOUS; SUBCUTANEOUS at 10:47

## 2025-04-14 RX ADMIN — AMLODIPINE BESYLATE 5 MG: 5 TABLET ORAL at 08:16

## 2025-04-14 RX ADMIN — INSULIN ASPART 10 UNITS: 100 INJECTION, SOLUTION INTRAVENOUS; SUBCUTANEOUS at 17:26

## 2025-04-14 RX ADMIN — PIPERACILLIN AND TAZOBACTAM 3.38 G: 3; .375 INJECTION, POWDER, FOR SOLUTION INTRAVENOUS at 14:37

## 2025-04-14 RX ADMIN — ACETAMINOPHEN 975 MG: 325 TABLET, FILM COATED ORAL at 14:36

## 2025-04-14 RX ADMIN — APIXABAN 5 MG: 5 TABLET, FILM COATED ORAL at 20:55

## 2025-04-14 RX ADMIN — APIXABAN 5 MG: 5 TABLET, FILM COATED ORAL at 08:16

## 2025-04-14 RX ADMIN — INSULIN ASPART 4 UNITS: 100 INJECTION, SOLUTION INTRAVENOUS; SUBCUTANEOUS at 08:19

## 2025-04-14 RX ADMIN — ALBUTEROL SULFATE 2.5 MG: 2.5 SOLUTION RESPIRATORY (INHALATION) at 07:19

## 2025-04-14 RX ADMIN — ATORVASTATIN CALCIUM 40 MG: 40 TABLET, FILM COATED ORAL at 20:52

## 2025-04-14 RX ADMIN — SODIUM CHLORIDE SOLN NEBU 3% 3 ML: 3 NEBU SOLN at 19:05

## 2025-04-14 RX ADMIN — INSULIN ASPART 6 UNITS: 100 INJECTION, SOLUTION INTRAVENOUS; SUBCUTANEOUS at 12:21

## 2025-04-14 RX ADMIN — SODIUM CHLORIDE SOLN NEBU 3% 3 ML: 3 NEBU SOLN at 07:20

## 2025-04-14 ASSESSMENT — ACTIVITIES OF DAILY LIVING (ADL)
ADLS_ACUITY_SCORE: 65
ADLS_ACUITY_SCORE: 61
ADLS_ACUITY_SCORE: 61
ADLS_ACUITY_SCORE: 64
ADLS_ACUITY_SCORE: 65
ADLS_ACUITY_SCORE: 64
ADLS_ACUITY_SCORE: 65
ADLS_ACUITY_SCORE: 65
ADLS_ACUITY_SCORE: 64
DEPENDENT_IADLS:: CLEANING;COOKING;LAUNDRY;SHOPPING;MEAL PREPARATION;MEDICATION MANAGEMENT;MONEY MANAGEMENT;TRANSPORTATION
ADLS_ACUITY_SCORE: 65
ADLS_ACUITY_SCORE: 64
ADLS_ACUITY_SCORE: 61
ADLS_ACUITY_SCORE: 65
ADLS_ACUITY_SCORE: 65
IADL_COMMENTS: IND AT BASELINE
ADLS_ACUITY_SCORE: 65
ADLS_ACUITY_SCORE: 65
ADLS_ACUITY_SCORE: 61
ADLS_ACUITY_SCORE: 61
ADLS_ACUITY_SCORE: 65
ADLS_ACUITY_SCORE: 65
ADLS_ACUITY_SCORE: 61
ADLS_ACUITY_SCORE: 64
ADLS_ACUITY_SCORE: 61

## 2025-04-14 NOTE — PLAN OF CARE
"  Problem: Adult Inpatient Plan of Care  Goal: Plan of Care Review  Description: The Plan of Care Review/Shift note should be completed every shift.  The Outcome Evaluation is a brief statement about your assessment that the patient is improving, declining, or no change.  This information will be displayed automatically on your shiftnote.  Outcome: Progressing  Flowsheets (Taken 4/14/2025 1710)  Plan of Care Reviewed With: patient  Goal: Patient-Specific Goal (Individualized)  Description: You can add care plan individualizations to a care plan. Examples of Individualization might be:  \"Parent requests to be called daily at 9am for status\", \"I have a hard time hearing out of my right ear\", or \"Do not touch me to wake me up as it startlesme\".  Outcome: Progressing  Goal: Absence of Hospital-Acquired Illness or Injury  Outcome: Progressing  Intervention: Identify and Manage Fall Risk  Recent Flowsheet Documentation  Taken 4/14/2025 1600 by Shannon Swanson RN  Safety Promotion/Fall Prevention: activity supervised  Taken 4/14/2025 1200 by Shannon Swanson RN  Safety Promotion/Fall Prevention: activity supervised  Intervention: Prevent Skin Injury  Recent Flowsheet Documentation  Taken 4/14/2025 1600 by Shannon Swanson RN  Body Position: position changed independently  Taken 4/14/2025 1200 by Shannon Swanson RN  Body Position: position changed independently  Goal: Optimal Comfort and Wellbeing  Outcome: Progressing  Goal: Readiness for Transition of Care  Outcome: Progressing     Problem: Wound  Goal: Optimal Coping  Outcome: Progressing  Goal: Optimal Functional Ability  Outcome: Progressing  Intervention: Optimize Functional Ability  Recent Flowsheet Documentation  Taken 4/14/2025 1600 by Shannon Swanson RN  Activity Management: bedrest  Taken 4/14/2025 1200 by Shannon Swanson RN  Activity Management: bedrest  Goal: Absence of Infection Signs and Symptoms  Outcome: Progressing  Goal: Improved Oral Intake  Outcome: " Progressing  Goal: Optimal Pain Control and Function  Outcome: Progressing  Goal: Skin Health and Integrity  Outcome: Progressing  Intervention: Optimize Skin Protection  Recent Flowsheet Documentation  Taken 4/14/2025 1600 by Shannon Swanson, RN  Activity Management: bedrest  Taken 4/14/2025 1200 by Shannon Swanson, RN  Activity Management: bedrest   Goal Outcome Evaluation:      Plan of Care Reviewed With: patient  Wound dressing changed twice this shift.

## 2025-04-14 NOTE — PLAN OF CARE
Problem: Pain Acute  Goal: Optimal Pain Control and Function  Outcome: Progressing  Intervention: Optimize Psychosocial Wellbeing  Recent Flowsheet Documentation  Taken 4/13/2025 2311 by Phil Reyes RN  Supportive Measures:   active listening utilized   decision-making supported  Diversional Activities: television  Intervention: Prevent or Manage Pain  Recent Flowsheet Documentation  Taken 4/13/2025 2311 by Phil Reyes RN  Sleep/Rest Enhancement: awakenings minimized  Medication Review/Management: medications reviewed   Goal Outcome Evaluation:             Pt alert and oriented.  VSS No sign of pain or discomfort seen at this moment. Denies nausea, dizziness, shortness of breath and lightheadedness. On BIPAP during night. On O2 via naso canula. Adequate intake and output. On consistent carb count. On IV antibiotic. BS check  AC an HS. Request for bed pan and continent of Urine (use noguera for urinary elimination). Skin looks at base line for the patient. Uses call light appropriately.  Mood pleasant and compliant for cares and medication. Handling hospitalization well.  Continue to monitor.      Phil Reyes RN

## 2025-04-14 NOTE — PROGRESS NOTES
BIPAP/CPAP NOTE    UNIT TYPE:  V60  MASK TYPE:  Full mask ( Medium)    SETTINGS:      BIPAP - S/T   IPAP/EPAP: 18/10   RATE: 16   FI02 - 45%       Patient was placed on BiPAP with above setting, Oxygen  45  % with an SpO2 98%,   Skin protective barriers were applied.   Pt on 4 L NC @Am shift.  Patient tolerating well.  RT will continue to monitor.

## 2025-04-14 NOTE — PLAN OF CARE
Goal Outcome Evaluation:     Pt was on BiPap ST 18/10 RR 16 30% at noc for 7hr 40 min, jose well, switched to 4L NC at 0720 a.m.     RR 18-22, HR 89-92, BS coarse I:E wheezes in a.m. with increased aeration after 1st neb, clear to diminished later.      Albuterol TID, Nebusal 3% BID, given via m-piece, jose well    Cont to monitor and treat

## 2025-04-14 NOTE — CONSULTS
Care Management Initial Consult    General Information    (Copied from H&P)  Marly Cannon is a 59y F PMHx COPD and T2DM who presents to [facility] for wound cares, therapies, and complex medical care. Pt transferred to [facility] from [facility] with PE, respiratory failure, CAP, and perineal abscess. Pt was extubated on 4/1 although she needed HFNC and still requires BiPAP at night intermittently. ID following and pt on zosyn for infection. Surgery followed the pt and performed I&D with penrose drain 3/31 with f/u I&D on 4/2 and 4/8. A wound vac was placed by surgery as well. Pt did well and was transferred to [facility] for ongoing cares.     Assessment completed with: Patient,    Type of CM/SW Visit: CM Role Introduction    Primary Care Provider verified and updated as needed: Yes   Readmission within the last 30 days: no previous admission in last 30 days      Reason for Consult: discharge planning, financial concerns  Advance Care Planning: Advance Care Planning Reviewed: no concerns identified          Communication Assessment  Patient's communication style: spoken language (English or Bilingual)    Hearing Difficulty or Deaf: no   Wear Glasses or Blind: no    Cognitive  Cognitive/Neuro/Behavioral: .WDL except  Level of Consciousness: alert  Arousal Level: opens eyes spontaneously  Orientation: disoriented to, situation  Mood/Behavior: calm, cooperative  Best Language: 0 - No aphasia  Speech: slurred    Living Environment:   People in home: significant other     Current living Arrangements: apartment      Able to return to prior arrangements: yes       Family/Social Support:  Care provided by: self  Provides care for: no one  Marital Status:   Support system: Significant Other, Sibling(s)       Tavo  Description of Support System: Supportive, Involved         Current Resources:   Patient receiving home care services: Yes  Skilled Home Care Services: Skilled Nursing     Community Resources: County Worker  Equipment  currently used at home: none  Supplies currently used at home:      Employment/Financial:  Employment Status: retired        Financial Concerns:             Does the patient's insurance plan have a 3 day qualifying hospital stay waiver?  No    Lifestyle & Psychosocial Needs:  Social Drivers of Health     Food Insecurity: Low Risk  (4/11/2025)    Food Insecurity     Within the past 12 months, did you worry that your food would run out before you got money to buy more?: No     Within the past 12 months, did the food you bought just not last and you didn t have money to get more?: No   Depression: Not at risk (2/16/2024)    PHQ-2     PHQ-2 Score: 0   Housing Stability: Low Risk  (4/11/2025)    Housing Stability     Do you have housing? : Yes     Are you worried about losing your housing?: No   Tobacco Use: Medium Risk (4/2/2025)    Patient History     Smoking Tobacco Use: Former     Smokeless Tobacco Use: Never     Passive Exposure: Current   Financial Resource Strain: Low Risk  (4/11/2025)    Financial Resource Strain     Within the past 12 months, have you or your family members you live with been unable to get utilities (heat, electricity) when it was really needed?: No   Alcohol Use: Not on file   Transportation Needs: Low Risk  (4/11/2025)    Transportation Needs     Within the past 12 months, has lack of transportation kept you from medical appointments, getting your medicines, non-medical meetings or appointments, work, or from getting things that you need?: No   Physical Activity: Unknown (5/16/2024)    Exercise Vital Sign     Days of Exercise per Week: 0 days     Minutes of Exercise per Session: Not on file   Interpersonal Safety: Low Risk  (4/11/2025)    Interpersonal Safety     Do you feel physically and emotionally safe where you currently live?: Yes     Within the past 12 months, have you been hit, slapped, kicked or otherwise physically hurt by someone?: No     Within the past 12 months, have you been  humiliated or emotionally abused in other ways by your partner or ex-partner?: No   Stress: No Stress Concern Present (5/16/2024)    [employer] - Occupational Stress Questionnaire     Feeling of Stress : Not at all   Social Connections: Unknown (5/16/2024)    Social Connection and Isolation Panel [NHANES]     Frequency of Communication with Friends and Family: Not on file     Frequency of Social Gatherings with Friends and Family: More than three times a week     Attends Yazidism Services: Not on file     Active Member of Clubs or Organizations: Not on file     Attends Club or Organization Meetings: Not on file     Marital Status: Not on file   Health Literacy: Not on file       Functional Status:  Prior to admission patient needed assistance:   Dependent ADLs:: Bathing, Dressing, Positioning, Transfers, Grooming  Dependent IADLs:: Cleaning, Cooking, Laundry, Shopping, Meal Preparation, Medication Management, Money Management, Transportation  Assesssment of Functional Status: Not at baseline with ADL Functioning, Not at baseline with mobility    Mental Health Status:  Mental Health Status: No Current Concerns       Chemical Dependency Status:                Values/Beliefs:  Spiritual, Cultural Beliefs, Yazidism Practices, Values that affect care: other (see comments) (patient was raised Yazdanism)               Discussed  Partnership in Safe Discharge Planning  document with patient/family: Yes: Discussed with patient that we will work together to set up an appropriate discharge plan.    Additional Information:  Met with patient in her room for  about 15 minutes this afternoon.  Patient is from [place], [place], and her sig other, Tavo is back home in [place]. She says he has no way to get down to the cities to see her.  Patient has a financial issues - she needs to pay her rent, but she has been in the hospital for the past few weeks, and she is the only person on her bank account.  This  writer contacted [employer] in [place], [phone] this afternoon. Spoke with a customer .  The bank officials are going to contact patient at her hospital phone number.  This writer also gave the customer  this writer's cell phone number in case there is a problem reaching patient.  They are going to try to send paperwork that patient can sign to add her sig other to her account, so they can pay the rent.   Patient's county SW is Nelly, [phone] and her home care nurse, Kristina, is at [phone].  This writer will reach out to these individuals to see if they can provide any further assistance.      Next Steps: will continue to follow patient during LTACH admission      Therese Woodson RN, BSN  Care Coordination  [facility]  [phone]

## 2025-04-14 NOTE — PLAN OF CARE
Goal Outcome Evaluation:      Plan of Care Reviewed With: patient  Patient Blood glucose was 374 this evening, recommended Novolog given - see MAR. MD notified, no new order.

## 2025-04-14 NOTE — PROGRESS NOTES
04/14/25 1400   Name of Certified Therapeutic Rec Specialist   Name of Certified Therapeutic Rec Specialist AMANDA Richard   Appointment Type   Type of Therapeutic Rec Session Therapeutic Rec Assessment   General Information   Patient Profile Review See Profile for full history and prior level of function   Daily Contact with Relatives or Friends Phone call;Visit   Community Involvement   Spiritual Practice Not connected/interested   Outings Dinner;Shopping   Hobbies/Interests   Cards Cribbage   Games Yahtzee   Word Puzzles Word Search   Music   Music Preferences [place]   Listens to Recorded Music Yes   Brought Own Equipment Yes   Media   TV / Movies TV   Sports / Physical Activities   Outdoor Activities Outdoor gardening   Sports/Exercise Walks   Sports Fan Football   Impression   Open to Socializing with Others Independent   Barriers to Leisure Endurance;Mobility   Treatment Plan   Type of Intervention Independent with activity;1:1 intervention   Equipment and Supplies While on Unit Puzzle books   Assessment Assessment completed, pt oriented to role of rec therapy and provided with leisure resource list. Pt expressed interest in word searches, which were provided. Pt also interested in seeing therapist 1:1 for leisure pursuits and increase socialization.

## 2025-04-14 NOTE — PROGRESS NOTES
[facility]    Medicine Progress Note - Hospitalist Service    Date of Admission:  4/11/2025    Hospital Course  Marly Cannon is a 59y F PMHx COPD and T2DM who presents to [place] for wound cares, therapies, and complex medical care. Pt transferred to [facility] from [facility] with PE, respiratory failure, CAP, and perineal abscess. Pt was extubated on 4/1 although she needed HFNC and still requires BiPAP at night intermittently. ID following and pt on zosyn for infection. Surgery followed the pt and performed I&D with penrose drain 3/31 with f/u I&D on 4/2 and 4/8. A wound vac was placed by surgery as well. Pt did well and was transferred to [facility] for ongoing cares.     [facility] Course  4/12-4/14: Needing BiPAP ovn. BG labile, may need changes to regimen in coming week.     Assessment & Plan      Acute Hypoxic Respiratory Failure  VAP, PTA  COPD, severe  ADEEL  - zosyn through 4/17  - inhalers as needed  - weaned off HFNC, now NC and Bipap at night  - RT following     Perineal Abscess  Condyloma Acuminata  - s/p acyclovir  - I&D x3  - zosyn through 4/17  - rectal tube in place for wound protection  - noguera for wound  - VAC in place  - WOC consult    PE, Acute, segmental, subsegmental  - apixaban     T2DM  - A1c 12.7  - BG labile since admission. May consider decreasing I:C ratio vs splitting lantus (favor former)  - lantus 25u daily  - I:C of 1:5 with meals  - ISS moderate    CKD3  - monitor Cr          Diet: Snacks/Supplements Adult: Expedite Cup; With Meals  Snacks/Supplements Adult: Magic Cup; With Meals  Snacks/Supplements Adult: Gelatein 20 (sugar-free); With Meals  Combination Diet Moderate Consistent Carb (60 g CHO per Meal) Diet; Easy to Chew (level 7); Thin Liquids (level 0)    DVT Prophylaxis: DOAC  Noguera Catheter: PRESENT, indication: Wound deterioration and failed external collection device  Lines: None       Cardiac Monitoring: None  Code Status: Full Code      Clinically Significant Risk Factors               #  "Hypoalbuminemia: Lowest albumin = 2.9 g/dL at 4/13/2025  2:14 AM, will monitor as appropriate     # Hypertension: Noted on problem list           # DMII: A1C = 12.7 % (Ref range: <5.7 %) within past 6 months   # Obesity: Estimated body mass index is 30.47 kg/m  as calculated from the following:    Height as of 3/22/25: 1.651 m (5' 5\").    Weight as of this encounter: 83.1 kg (183 lb 1.6 oz)., PRESENT ON ADMISSION     # COPD: noted on problem list        Social Drivers of Health    Tobacco Use: Medium Risk (4/2/2025)    Patient History     Smoking Tobacco Use: Former     Smokeless Tobacco Use: Never     Passive Exposure: Current   Physical Activity: Unknown (5/16/2024)    Exercise Vital Sign     Days of Exercise per Week: 0 days   Social Connections: Unknown (5/16/2024)    Social Connection and Isolation Panel [NHANES]     Frequency of Social Gatherings with Friends and Family: More than three times a week          Disposition Plan     Medically Ready for Discharge: Anticipated in 5+ Days             Tomas Benitez MD  Hospitalist Service  LTACH  Securely message with [employer] (more info)  Text page via [employer] Paging/Directory   ______________________________________________________________________    Interval History   - no acute events ovn  - pt awake in bed this morning  - doing well today   - no issues per RN  - no other acute concerns    Physical Exam   Vital Signs: Temp: 98  F (36.7  C) Temp src: Axillary BP: 128/64 Pulse: 92   Resp: 20 SpO2: 97 % O2 Device: Nasal cannula Oxygen Delivery: 4 LPM  Weight: 183 lbs 1.61 oz  Gen: awake, alert, normal appearing, comfortable  HEET: EOMI, MMM  CV: RRR  Pulm: CTAB  GI: soft, NT/ND  Integ: see WOC note for full assessment    Medical Decision Making       35 MINUTES SPENT BY ME on the date of service doing chart review, history, exam, documentation & further activities per the note.      Data         Imaging results reviewed over the past 24 hrs:   No results found for this " or any previous visit (from the past 24 hours).

## 2025-04-15 ENCOUNTER — APPOINTMENT (OUTPATIENT)
Dept: OCCUPATIONAL THERAPY | Facility: CLINIC | Age: 60
DRG: 602 | End: 2025-04-15
Attending: INTERNAL MEDICINE
Payer: COMMERCIAL

## 2025-04-15 ENCOUNTER — APPOINTMENT (OUTPATIENT)
Dept: PHYSICAL THERAPY | Facility: CLINIC | Age: 60
DRG: 602 | End: 2025-04-15
Attending: INTERNAL MEDICINE
Payer: COMMERCIAL

## 2025-04-15 LAB
BACTERIA BRONCH: NORMAL
GLUCOSE BLDC GLUCOMTR-MCNC: 120 MG/DL (ref 70–99)
GLUCOSE BLDC GLUCOMTR-MCNC: 155 MG/DL (ref 70–99)
GLUCOSE BLDC GLUCOMTR-MCNC: 164 MG/DL (ref 70–99)
GLUCOSE BLDC GLUCOMTR-MCNC: 198 MG/DL (ref 70–99)

## 2025-04-15 PROCEDURE — 94640 AIRWAY INHALATION TREATMENT: CPT | Mod: 76

## 2025-04-15 PROCEDURE — G0463 HOSPITAL OUTPT CLINIC VISIT: HCPCS

## 2025-04-15 PROCEDURE — 97535 SELF CARE MNGMENT TRAINING: CPT | Mod: GO | Performed by: OCCUPATIONAL THERAPIST

## 2025-04-15 PROCEDURE — 120N000017 HC R&B RESPIRATORY CARE

## 2025-04-15 PROCEDURE — 250N000011 HC RX IP 250 OP 636: Performed by: STUDENT IN AN ORGANIZED HEALTH CARE EDUCATION/TRAINING PROGRAM

## 2025-04-15 PROCEDURE — 250N000009 HC RX 250: Performed by: STUDENT IN AN ORGANIZED HEALTH CARE EDUCATION/TRAINING PROGRAM

## 2025-04-15 PROCEDURE — 999N000157 HC STATISTIC RCP TIME EA 10 MIN

## 2025-04-15 PROCEDURE — 999N000123 HC STATISTIC OXYGEN O2DAILY TECH TIME

## 2025-04-15 PROCEDURE — 99232 SBSQ HOSP IP/OBS MODERATE 35: CPT | Performed by: INTERNAL MEDICINE

## 2025-04-15 PROCEDURE — 97530 THERAPEUTIC ACTIVITIES: CPT | Mod: GP | Performed by: PHYSICAL THERAPIST

## 2025-04-15 PROCEDURE — 94640 AIRWAY INHALATION TREATMENT: CPT

## 2025-04-15 PROCEDURE — 94660 CPAP INITIATION&MGMT: CPT

## 2025-04-15 PROCEDURE — 97110 THERAPEUTIC EXERCISES: CPT | Mod: GP | Performed by: PHYSICAL THERAPIST

## 2025-04-15 PROCEDURE — 250N000013 HC RX MED GY IP 250 OP 250 PS 637: Performed by: INTERNAL MEDICINE

## 2025-04-15 PROCEDURE — 250N000013 HC RX MED GY IP 250 OP 250 PS 637: Performed by: STUDENT IN AN ORGANIZED HEALTH CARE EDUCATION/TRAINING PROGRAM

## 2025-04-15 RX ADMIN — AMLODIPINE BESYLATE 5 MG: 5 TABLET ORAL at 09:03

## 2025-04-15 RX ADMIN — OXYCODONE HYDROCHLORIDE 10 MG: 10 TABLET ORAL at 10:31

## 2025-04-15 RX ADMIN — INSULIN ASPART 1 UNITS: 100 INJECTION, SOLUTION INTRAVENOUS; SUBCUTANEOUS at 12:50

## 2025-04-15 RX ADMIN — ALBUTEROL SULFATE 2.5 MG: 2.5 SOLUTION RESPIRATORY (INHALATION) at 19:49

## 2025-04-15 RX ADMIN — INSULIN ASPART 10 UNITS: 100 INJECTION, SOLUTION INTRAVENOUS; SUBCUTANEOUS at 09:05

## 2025-04-15 RX ADMIN — ALBUTEROL SULFATE 2.5 MG: 2.5 SOLUTION RESPIRATORY (INHALATION) at 07:18

## 2025-04-15 RX ADMIN — PIPERACILLIN AND TAZOBACTAM 3.38 G: 3; .375 INJECTION, POWDER, FOR SOLUTION INTRAVENOUS at 05:29

## 2025-04-15 RX ADMIN — INSULIN ASPART 12 UNITS: 100 INJECTION, SOLUTION INTRAVENOUS; SUBCUTANEOUS at 18:09

## 2025-04-15 RX ADMIN — SODIUM CHLORIDE SOLN NEBU 3% 3 ML: 3 NEBU SOLN at 19:50

## 2025-04-15 RX ADMIN — SODIUM CHLORIDE SOLN NEBU 3% 3 ML: 3 NEBU SOLN at 07:18

## 2025-04-15 RX ADMIN — ATORVASTATIN CALCIUM 40 MG: 40 TABLET, FILM COATED ORAL at 21:59

## 2025-04-15 RX ADMIN — INSULIN ASPART 1 UNITS: 100 INJECTION, SOLUTION INTRAVENOUS; SUBCUTANEOUS at 09:04

## 2025-04-15 RX ADMIN — INSULIN ASPART 19 UNITS: 100 INJECTION, SOLUTION INTRAVENOUS; SUBCUTANEOUS at 12:50

## 2025-04-15 RX ADMIN — ACETAMINOPHEN 975 MG: 325 TABLET, FILM COATED ORAL at 05:30

## 2025-04-15 RX ADMIN — ACETAMINOPHEN 975 MG: 325 TABLET, FILM COATED ORAL at 13:11

## 2025-04-15 RX ADMIN — ACETAMINOPHEN 975 MG: 325 TABLET, FILM COATED ORAL at 22:00

## 2025-04-15 RX ADMIN — UMECLIDINIUM BROMIDE AND VILANTEROL TRIFENATATE 1 PUFF: 62.5; 25 POWDER RESPIRATORY (INHALATION) at 09:03

## 2025-04-15 RX ADMIN — APIXABAN 5 MG: 5 TABLET, FILM COATED ORAL at 22:00

## 2025-04-15 RX ADMIN — ALBUTEROL SULFATE 2.5 MG: 2.5 SOLUTION RESPIRATORY (INHALATION) at 16:25

## 2025-04-15 RX ADMIN — INSULIN GLARGINE 30 UNITS: 100 INJECTION, SOLUTION SUBCUTANEOUS at 22:04

## 2025-04-15 RX ADMIN — PIPERACILLIN AND TAZOBACTAM 3.38 G: 3; .375 INJECTION, POWDER, FOR SOLUTION INTRAVENOUS at 13:10

## 2025-04-15 RX ADMIN — APIXABAN 5 MG: 5 TABLET, FILM COATED ORAL at 09:03

## 2025-04-15 RX ADMIN — ANORECTAL OINTMENT: 15.7; .44; 24; 20.6 OINTMENT TOPICAL at 22:00

## 2025-04-15 RX ADMIN — PIPERACILLIN AND TAZOBACTAM 3.38 G: 3; .375 INJECTION, POWDER, FOR SOLUTION INTRAVENOUS at 22:25

## 2025-04-15 ASSESSMENT — ACTIVITIES OF DAILY LIVING (ADL)
ADLS_ACUITY_SCORE: 65
ADLS_ACUITY_SCORE: 65
ADLS_ACUITY_SCORE: 61
ADLS_ACUITY_SCORE: 65
ADLS_ACUITY_SCORE: 61
ADLS_ACUITY_SCORE: 64
ADLS_ACUITY_SCORE: 61
ADLS_ACUITY_SCORE: 65
ADLS_ACUITY_SCORE: 64
ADLS_ACUITY_SCORE: 61
ADLS_ACUITY_SCORE: 64
ADLS_ACUITY_SCORE: 61
ADLS_ACUITY_SCORE: 65
ADLS_ACUITY_SCORE: 61
ADLS_ACUITY_SCORE: 65

## 2025-04-15 NOTE — PROGRESS NOTES
SPIRITUAL HEALTH SERVICES ([facility])  SPIRITUAL ASSESSMENT Progress Note  [facility]. Unit LTACH     REFERRAL SOURCE: New admission      Marly is from [facility] and wishes to return there.  She lives with her boyfriend Tavo. She is Rastafarian but unaffiliated with a Buddhism.  She recounted that there have been three deaths within her family but it's not clear if she's a reliable historian. She recounted that her brother, sister, mother and father all  within the last year. She says that she still has two sisters living, one in [place] and one in [place].     While she says that carlin is important to her she is unable to articulate how nor did she raise any specific emotional or spiritual needs. She is open to a followup visit.      PLAN: [facility] will follow, as able, during her hospital stay.      Vero Luciano, Ph.D., [facility]      Securely Message with [employer] or [employer]â„¢ Audio Pager.    Non-urgent needs:  Phone  to leave a message

## 2025-04-15 NOTE — PLAN OF CARE
Problem: Adult Inpatient Plan of Care  Goal: Optimal Comfort and Wellbeing  Outcome: Progressing     Problem: Pain Acute  Goal: Optimal Pain Control and Function  Outcome: Progressing   Goal Outcome Evaluation:             Pt alert and oriented x4. Calm and cooperative with cares. Denied pain or discomfort. Had a few small BM. Wound in reji area   cleanse  per order. BG at HS was 282 and 4 units of Novolog was given per sliding scale order. Will continue to monitor.

## 2025-04-15 NOTE — PROGRESS NOTES
"   04/14/25 1100   Appointment Info   Signing Clinician's Name / Credentials (OT) Tricia Dior, OTR/L   Living Environment   People in Home significant other   Current Living Arrangements apartment   Self-Care   Usual Activity Tolerance moderate   Current Activity Tolerance poor   Activity/Exercise/Self-Care Comment Pt states she is IND at baseline.   Instrumental Activities of Daily Living (IADL)   IADL Comments IND at baseline   General Information   Onset of Illness/Injury or Date of Surgery 03/31/25   Patient/Family Therapy Goal Statement (OT) Go home as soon as able   Existing Precautions/Restrictions fall;oxygen therapy device and L/min   Left Upper Extremity (Weight-bearing Status) full weight-bearing (FWB)   Right Upper Extremity (Weight-bearing Status) full weight-bearing (FWB)   Left Lower Extremity (Weight-bearing Status) full weight-bearing (FWB)   Right Lower Extremity (Weight-bearing Status) full weight-bearing (FWB)   General Observations and Info 59y F PMHx COPD and T2DM who presents to [place] for wound cares, therapies, and complex medical care. Pt transferred to [facility] from H with PE, respiratory failure, CAP, and perineal abscess. Pt was extubated on 4/1 although she needed HFNC and still requires BiPAP at night intermittently. ID following and pt on zosyn for infection. Surgery followed the pt and performed I&D with penrose drain 3/31 with f/u I&D on 4/2 and 4/8. A wound vac was placed by surgery as well. Pt did well and was transferred to [facility] for ongoing cares.   Cognitive Status Examination   Orientation Status person   Cognitive Status Comments Pt affirms feeling \"fuzzy\" at times.   Visual Perception   Visual Impairment/Limitations WNL   Sensory   Sensory Quick Adds UE sensation intact   Pain Assessment   Patient Currently in Pain No   Range of Motion Comprehensive   Comment, General Range of Motion Limited sh flex to 90 deg due to weakness, PROM WFL.   Strength Comprehensive (MMT) "   Comment, General Manual Muscle Testing (MMT) Assessment BUE sh/bicep 3+/5   Coordination   Upper Extremity Coordination No deficits were identified   Bed Mobility   Supine-Sit [place] (Bed Mobility) moderate assist (50% patient effort)   Sit-Supine [place] (Bed Mobility) moderate assist (50% patient effort)   Transfers   Transfer Comments Mod A STS per PT eval same date   Activities of Daily Living   BADL Assessment/Intervention bathing;upper body dressing;lower body dressing;grooming;toileting   Bathing Assessment/Intervention   [place] Level (Bathing) minimum assist (75% patient effort)   Upper Body Dressing Assessment/Training   [place] Level (Upper Body Dressing) moderate assist (50% patient effort)   Lower Body Dressing Assessment/Training   [place] Level (Lower Body Dressing) dependent (less than 25% patient effort)   Grooming Assessment/Training   [place] Level (Grooming) contact guard assist   Toileting   [place] Level (Toileting) dependent (less than 25% patient effort)   Clinical Impression   Criteria for Skilled Therapeutic Interventions Met (OT) Yes, treatment indicated   OT Diagnosis impaired ADLs, weakness, decreased activity tolerance, cog impairment   OT Problem List-Impairments impacting ADL activity tolerance impaired;problems related to;cognition;range of motion (ROM);strength   Assessment of Occupational Performance 5 or more Performance Deficits   Identified Performance Deficits dressing, g/h, toileting, transfers, bed mobility, bathing   Planned Therapy Interventions (OT) ADL retraining;bed mobility training;cognition;ROM;strengthening;transfer training;progressive activity/exercise   Risk & Benefits of therapy have been explained care plan/treatment goals reviewed   Clinical Impression Comments Cog assmt appropriate, pt responsive and directable. Weakness barrier at this time.   OT Total Evaluation Time   OT [place], Low Complexity Minutes (26102) 10   OT  Goals   Therapy Frequency (OT) 5 times/week   OT Predicted Duration/Target Date for Goal Attainment 05/26/25   OT Goals Hygiene/Grooming;Upper Body Dressing;Lower Body Dressing;Upper Body Bathing;Bed Mobility;Toilet Transfer/Toileting;Cognition;OT Goal 1   OT: Hygiene/Grooming supervision/stand-by assist;while standing   OT: Upper Body Dressing Supervision/stand-by assist   OT: Lower Body Dressing Supervision/stand-by assist   OT: Upper Body Bathing Supervision/stand-by assist   OT: Bed Mobility Modified independent   OT: Toilet Transfer/Toileting Supervision/stand-by assist   OT: Cognitive Patient/caregiver will verbalize understanding of cognitive assessment results/recommendations as needed for safe discharge planning   OT: Perform aerobic activity with stable cardiovascular response Completed   OT: Goal 1 Pt will participate in 15 mins of ex to progress BUE MMT to 5/5.   Therapeutic Procedures/Exercise   Therapeutic Procedure: strength, endurance, ROM, flexibillity minutes (91014) 8   Symptoms Noted During/After Treatment none   Treatment Detail/Skilled Intervention BUE AAROM reps to all planes, x10 reps each set 5 sets.   OT Discharge Planning   OT Plan 4/14: ADLs EOB and progress to standing at sink, ACE III, UB ex, bed mobility   OT Discharge Recommendation (DC Rec) Transitional Care Facility   OT Rationale for DC Rec pt may require further therapy prior to dc to home.   OT Brief overview of current status Eval completed, appropriate for skilled OT.   Total Session Time   Timed Code Treatment Minutes 8   Total Session Time (sum of timed and untimed services) 18   Hearing, Vision, and Speech: Expression    Expression of Ideas and Wants Without difficulty   Hearing, Vision, and Speech: Understanding   Understanding Verbal/Non-Verbal Content Understands   Eating   Reason if not Attempted Tube feeding or oral hydration only source of nutrition   Oral Hygiene   Patient Performance Supervision or touching  assistance   Discharge Goal (Admit only) IND   Wash Upper Body   Patient Performance Partial/moderate assist   Toileting Hygiene   Patient Performance Dependent   Toilet Transfer   Patient Performance Dependent

## 2025-04-15 NOTE — PLAN OF CARE
"  Problem: Adult Inpatient Plan of Care  Goal: Plan of Care Review  Description: The Plan of Care Review/Shift note should be completed every shift.  The Outcome Evaluation is a brief statement about your assessment that the patient is improving, declining, or no change.  This information will be displayed automatically on your shiftnote.  Outcome: Progressing  Goal: Patient-Specific Goal (Individualized)  Description: You can add care plan individualizations to a care plan. Examples of Individualization might be:  \"Parent requests to be called daily at 9am for status\", \"I have a hard time hearing out of my right ear\", or \"Do not touch me to wake me up as it startlesme\".  Outcome: Progressing  Goal: Absence of Hospital-Acquired Illness or Injury  Outcome: Progressing  Goal: Optimal Comfort and Wellbeing  Outcome: Progressing  Goal: Readiness for Transition of Care  Outcome: Progressing     Problem: Pain Acute  Goal: Optimal Pain Control and Function  Outcome: Progressing  Intervention: Optimize Psychosocial Wellbeing  Recent Flowsheet Documentation  Taken 4/15/2025 1100 by Jazmin So RN  Diversional Activities: television     Problem: Wound  Goal: Optimal Coping  Outcome: Progressing  Intervention: Support Patient and Family Response  Recent Flowsheet Documentation  Taken 4/15/2025 1100 by Jazmin So RN  Family/Support System Care: (not t bedside) other (see comments)  Goal: Optimal Functional Ability  Outcome: Progressing  Goal: Absence of Infection Signs and Symptoms  Outcome: Progressing  Goal: Improved Oral Intake  Outcome: Progressing  Goal: Optimal Pain Control and Function  Outcome: Progressing  Goal: Skin Health and Integrity  Outcome: Progressing  Goal: Optimal Wound Healing  Outcome: Progressing     Problem: Infection  Goal: Absence of Infection Signs and Symptoms  Outcome: Progressing   Goal Outcome Evaluation:         Patient alert and oriented x 4, able to communicate effectively. " Vital signs stable Temp: 98.2  F (36.8  C) Temp src: Axillary BP: 121/66 Pulse: 81   Resp: 20 SpO2: 94 % O2 Device: Nasal cannula Oxygen Delivery: 2 LPM. [place] is patent draining clear yellow urine, incontinent of stool. Wound cares done by WOC. Patient received 10mg of Oxycodone before wound dressing change. Good food and fluid intake.    Jazmin So RN

## 2025-04-15 NOTE — PROGRESS NOTES
04/14/25 0920   Appointment Info   Signing Clinician's Name / Credentials (PT) WILIAM Patiño   Rehab Comments (PT) poornima, MIK, NC, wound vac   Living Environment   People in Home significant other   Current Living Arrangements apartment   Home Accessibility stairs to enter home   Number of Stairs, Main Entrance 4   Transportation Anticipated public transportation   Living Environment Comments Pt lives in an apartment with 4 steps to enter. Apartment is on the first level.   Self-Care   Usual Activity Tolerance moderate   Current Activity Tolerance fair   Equipment Currently Used at Home none   Activity/Exercise/Self-Care Comment Pt states she was ind with all mobility and ADLs without an AD.   General Information   Onset of Illness/Injury or Date of Surgery 03/22/25   Referring Physician Tomas Benitez MD   Pertinent History of Current Problem (include personal factors and/or comorbidities that impact the POC) Per H&P, 59y F PMHx COPD and T2DM who presents to [place] for wound cares, therapies, and complex medical care. Pt transferred to [facility] from [facility] with PE, respiratory failure, CAP, and perineal abscess. Pt was extubated on 4/1 although she needed HFNC and still requires BiPAP at night intermittently. ID following and pt on zosyn for infection. Surgery followed the pt and performed I&D with penrose drain 3/31 with f/u I&D on 4/2 and 4/8. A wound vac was placed by surgery as well. Pt did well and was transferred to [facility] for ongoing cares.   Existing Precautions/Restrictions oxygen therapy device and L/min   Weight-Bearing Status - LUE full weight-bearing   Weight-Bearing Status - RUE full weight-bearing   Weight-Bearing Status - LLE full weight-bearing   Weight-Bearing Status - RLE full weight-bearing   Cognition   Affect/Mental Status (Cognition) WFL   Cognitive Status Comments Pt is slower to respond, pleasant. Follows commands with time and repetition on occasion   Integumentary/Edema    Integumentary/Edema Comments Integument managed by nursing   Posture    Posture Forward head position;Protracted shoulders   Range of Motion (ROM)   ROM Comment PROM: B hip flexion to 90 with pain on L, ankle DF 5-10 degrees from neutral, B knee flexion/extension and hip IR/ER WFL   Strength (Manual Muscle Testing)   Strength Comments MMT: B knee extension 4/5, ankle PF 5/5, ankle DF 4+/5, B hip flexion 3/5   Bed Mobility   Bed Mobility rolling right;supine-sit-supine   Rolling Right [place] (Bed Mobility) supervision   Supine-Sit-Supine [place] (Bed Mobility) minimum assist (75% patient effort)   Transfers   Transfers sit-stand transfer   Comment, (Transfers) modAx1   Sit-Stand Transfer   Sit-Stand [place] (Transfers) moderate assist (50% patient effort)   Balance   Balance Comments Decreased sitting and standing balance   Sensory Examination   Sensory Perception WNL   Muscle Tone   Muscle Tone no deficits were identified   Clinical Impression   Criteria for Skilled Therapeutic Intervention Yes, treatment indicated   PT Diagnosis (PT) Impaired functional mobility   Influenced by the following impairments strength, balance, respiratory status, skin integrity, ROM, activity tolerance   Functional limitations due to impairments bed mobility, transfers, gait, stairs   Clinical Presentation (PT Evaluation Complexity) evolving   Clinical Presentation Rationale Pt presents as medically diagnosed   Clinical Decision Making (Complexity) moderate complexity   Planned Therapy Interventions (PT) balance training;bed mobility training;gait training;home exercise program;neuromuscular re-education;patient/family education;postural re-education;ROM (range of motion);stair training;strengthening;stretching;transfer training   Risk & Benefits of therapy have been explained evaluation/treatment results reviewed;patient   Clinical Impression Comments Pt presents with decreased strength, balance, activity tolerance,  and respiratory deficits that impair the pt's ability to perform transfers, ambulate, and do stairs. Skilled PT is necessary in order to address these impairments and progress pt to prior level of function.   PT Total Evaluation Time   PT Eval, Moderate Complexity Minutes (32143) 10   Physical Therapy Goals   PT Frequency 6x/week   PT Predicted Duration/Target Date for Goal Attainment 05/23/25   PT Goals Bed Mobility;Transfers;Gait;Stairs;Aerobic Activity;PT Goal 1   PT: Bed Mobility Modified independent;Supine to/from sit;Rolling   PT: Transfers Modified independent;Sit to/from stand;Bed to/from chair   PT: Gait Modified independent;Rolling walker;Greater than 200 feet   PT: Stairs Supervision/stand-by assist;4 stairs   PT: Perform aerobic activity with stable cardiovascular response continuous activity;5 minutes;NuStep  (with SpO2 sats WNL)   PT: Goal 1 completed   Interventions   Interventions Quick Adds Therapeutic Procedure   Therapeutic Procedure/Exercise   Ther. Procedure: strength, endurance, ROM, flexibillity Minutes (28713) 10   Symptoms Noted During/After Treatment none   Treatment Detail/Skilled Intervention Pt performed the following exercises in supine in order to improve B LE strength: 10 reps of heel slides, SAQs, and hip abduction/adduction, and 15 reps of ankle pumps. VCs and active assistance given for first rep of SAQs and hip abduction/adduction for correct technique. Pt reports no pain with exercises.   Therapeutic Activity   Therapeutic Activities: dynamic activities to improve functional performance Minutes (01609) 10   Symptoms Noted During/After Treatment Fatigue   Treatment Detail/Skilled Intervention Pt in bed upon arrival. Rolling to R with SBA. Supine to sit with Phil. Pt able to sit EOB with SBA-CGA with UE support on bedrails. Pt given VCs to lean forward in order to sit with more upright posture. Sit to stand x 3 with modAx1, VCs given to stand up tall and squeeze glutes. Pt reports  no pain with exercises or show any signs of dyspnea.   PT Discharge Planning   PT Plan bed mobility, transfers, gait, stairs, nustep   PT Discharge Recommendation (DC Rec) Transitional Care Facility;home with home care physical therapy   PT Rationale for DC Rec Pt requires assist for safe functional mobility.   PT Brief overview of current status Pt eval completed at bedside. Pt presents with decreased strength, balance, activity tolerance, and respiratory deficits that impair the pt's ability to perform transfers, ambulate, and do stairs. Skilled PT is necessary in order to address these impairments and progress pt to prior level of function.   Physical Therapy Time and Intention   Timed Code Treatment Minutes 20   Total Session Time (sum of timed and untimed services) 30   Post Acute Settings Only   What unit is patient on? LTACH   Roll Left and Right   Patient Performance Supervision or touching assistance   Sit to Lying   Patient Performance Partial/moderate assist   Lying to Sitting on Side of Bed   Patient Performance Partial/moderate assist   Sit to Stand   Patient Performance Partial/moderate assist   Discharge Goal (Admit only) INDEPENDENT   Chair/Bed to Chair Transfer   Reason if not Attempted Safety concerns   Walk 10 feet   Reason if not Attempted Safety concerns   Walk 50 feet with Two Turns   Reason if not Attempted Safety concerns   Walk 150 feet   Reason if not Attempted Safety concerns   Walk 10 Feet on Uneven Surfaces   Reason if not Attempted Safety concerns   Wheel 50 Feet With Two Turns   Reason if not Attempted Activity not applicable   Wheel 150 feet   Reason if not Attempted Activity not applicable   1 Step   Reason if not Attempted Safety concerns   4 Steps   Reason if not Attempted Safety concerns   12 Steps   Reason if not Attempted Safety concerns   Car Transfer   Reason if not Attempted Environmental limitations (e.g., lack of equipment,weather constraints)   Picking up objects   Reason  if not Attempted Safety concerns

## 2025-04-15 NOTE — PROGRESS NOTES
[facility]    Medicine Progress Note - Hospitalist Service    Date of Admission:  4/11/2025    Hospital Course  Marly Cannon is a 59y F PMHx COPD and T2DM who presents to [place] for wound cares, therapies, and complex medical care. Pt transferred to [facility] from OSH with PE, respiratory failure, CAP, and perineal abscess. Pt was extubated on 4/1 although she needed HFNC and still requires BiPAP at night intermittently. ID following and pt on zosyn for infection. Surgery followed the pt and performed I&D with penrose drain 3/31 with f/u I&D on 4/2 and 4/8. A wound vac was placed by surgery as well. Pt did well and was transferred to [facility] for ongoing cares.      [facility] Course  4/12-4/14: Needing BiPAP ovn. BG labile, may need changes to regimen in coming week.       Assessment & Plan     Acute on Chronic Hypoxic Respiratory Failure  VAP, PTA  COPD, severe  ADEEL  - Continue zosyn through 4/17  - inhalers as needed  - weaned off HFNC, now NC and Bipap at night. She normally at 2 LPM supplemental oxygen at home.   - RT following     Perineal Abscess  Condyloma Acuminata  s/p acyclovir and I&D x3  - zosyn through 4/17  - rectal tube in place for wound protection  - noguera for wound  - VAC in place  - Continue wound care per [facility] nurse    PE, Acute, segmental, subsegmental  - On apixaban     T2DM  Poorly controlled with A1c 12.7. Used to be on insulin pump. No longer on it due to intellectual delay.  BG labile since admission.   - Continue Lantus 30 u daily  - I:C of 1:5 with meals  - ISS moderate    CKD3b  - Creatinine stable at baseline. Monitor Cr    History of intellectual delay: supportive care         Diet: Snacks/Supplements Adult: Expedite Cup; With Meals  Snacks/Supplements Adult: Magic Cup; With Meals  Snacks/Supplements Adult: Gelatein 20 (sugar-free); With Meals  Combination Diet Moderate Consistent Carb (60 g CHO per Meal) Diet; Easy to Chew (level 7); Thin Liquids (level 0)    DVT Prophylaxis: Enoxaparin  "(Lovenox) SQ  Landrum Catheter: PRESENT, indication: Wound deterioration and failed external collection device  Lines: None     Cardiac Monitoring: None  Code Status: Full Code      Clinically Significant Risk Factors               # Hypoalbuminemia: Lowest albumin = 2.9 g/dL at 4/13/2025  2:14 AM, will monitor as appropriate     # Hypertension: Noted on problem list           # DMII: A1C = 12.7 % (Ref range: <5.7 %) within past 6 months   # Obesity: Estimated body mass index is 31.62 kg/m  as calculated from the following:    Height as of 3/22/25: 1.651 m (5' 5\").    Weight as of this encounter: 86.2 kg (190 lb)., PRESENT ON ADMISSION     # COPD: noted on problem list        Social Drivers of Health    Tobacco Use: Medium Risk (4/2/2025)    Patient History     Smoking Tobacco Use: Former     Smokeless Tobacco Use: Never     Passive Exposure: Current   Physical Activity: Unknown (5/16/2024)    Exercise Vital Sign     Days of Exercise per Week: 0 days   Social Connections: Unknown (5/16/2024)    Social Connection and Isolation Panel [NHANES]     Frequency of Social Gatherings with Friends and Family: More than three times a week          Disposition Plan     Medically Ready for Discharge: Anticipated in 5+ Days             Yolis Olvera MD  Hospitalist Service  [facility]  Securely message with [employer] (more info)  Text page via [employer] Paging/Directory   ______________________________________________________________________    Interval History   Patient reports feeling OK. No questions or concerns.    Physical Exam   Vital Signs: Temp: 98.4  F (36.9  C) Temp src: Axillary BP: 138/72 Pulse: 85   Resp: 18 SpO2: 93 % O2 Device: Nasal cannula Oxygen Delivery: 2 LPM  Weight: 190 lbs 0 oz    General appearance: not in acute distress. Wound vac in right groin.  HEENT: PERRL, EOMI  Lungs: Clear breath sounds in bilateral lung fields  Cardiovascular: Regular rate and rhythm, normal S1-S2  Abdomen: Soft, non tender, no " distension  Musculoskeletal: No joint swelling  Skin: No rash and no edema  Neurology: Awake and alert.  Cranial nerves II - XII normal.  Normal muscle strength in all four extremities.     Medical Decision Making       48 MINUTES SPENT BY ME on the date of service doing chart review, history, exam, documentation & further activities per the note.      Data         Imaging results reviewed over the past 24 hrs:   No results found for this or any previous visit (from the past 24 hours).

## 2025-04-15 NOTE — PROVIDER NOTIFICATION
04/15/25 0000   Mode: CPAP/ BiPAP/ AVAPS/ AVAPS AE   CPAP/BiPAP/ AVAPS/ AVAPS AE Mode BiPAP S/T   Equipment   Device V60   CPAP/BiPAP/Settings   $CPAP/BiPAP Subsequent completed   BIPAP/CPAP On Standby On   IPAP/EPAP (cmH2O) 18/10   Rate (breaths/min) 16   Oxygen (%) 40   Timed Inspiration (sec) 0.9   IPAP RISE  Settings (V60) 2   CPAP/BiPAP Patient Parameters   IPAP (cm H2O) 18 cmH2O   EPAP (cm H2O) 10 cmH2O   RR Total (breaths/min) 16 breaths/min   Vt (mL) 536 mL   Minute Ventilation (L/min) 9.5 L/min   Peak Inspiratory Pressure (cm H2O) 18 cmH2O   Pt.  Leak (L/min) 4 L/min   CPAP/BiPAP/AVAPS/AVAPS AE Alarms   High Pressure (cm H2O) 25 cmH2O   Low Pressure (cm H2O) 5   Low Pressure Delay (sec) 20 sec   Lo Min Vent 3   High Rate (breaths/min) 45 breaths/min   Low Rate (breaths/min) 16   Audible Alarm set at (Volume of Alarm) 7   Humidifier Checked N/A   RT Device Skin Assessment   Oxygen Delivery Device CPAP/BiPAP Mask   Interface Face Mask - Medium   Strap Tightness Finger Allowance between head and device strap   Device Skin Interventions Taken Strap adjusted to allow 1 finger between skin and device strap   Respiratory WDL   Respiratory WDL WDL   Rhythm/Pattern, Respiratory depth regular;pattern regular   Expansion/Accessory Muscles/Retractions no use of accessory muscles   Ambu at Bedside present     BP (!) 157/74 (BP Location: Left arm)   Pulse 92   Temp 97.9  F (36.6  C) (Axillary)   Resp 16   Wt 83.1 kg (183 lb 1.6 oz)   SpO2 97%   BMI 30.47 kg/m   BIPAP was off per pt request since 3 a.m. On 3-4 L nasal cannula, SpO2 95-97%. Pt is awake , in no respiratory distress. Will continue to monitor.

## 2025-04-15 NOTE — CONSULTS
[facility] Nurse Inpatient Assessment     Consulted for: perineal    Summary: See updated VAC orders below    WO nurse follow-up plan: weekly    Patient History (according to provider note(s):      Marly Cannon is a 59 year old woman with history of intellectual disability & memory issues, COPD*on supplemental oxygen, untreated ADEEL, tobacco use d/o, HTN, pAFib, & poorly controlled T2DM w/ recent hospitalization for DKA (3/18 - 3/20), who presented to the hospital in [facility] on 3/22/2025 with complaints of dyspnea, hyperglycemia, and chest pain. She was found to have an YADIRA, LLL PE, DKA, and A-fib with RVR. She was started on an insulin drip, therapeutic dose enoxaparin, Zosyn for possible pneumonia, and diltiazem drip for management of her RVR. Her DKA has resolved, she was switched from diltiazem to amiodarone drip for management of A-fib with RVR; however, her degree of respiratory failure was still concerning and she was intubated 3/23 for management of worsening hypoxia. She had a repeat CTA that demonstrated progression of her PE and RLL collapse concerning for mucous plugging and pneumonia. She was switched from enoxaparin to heparin drip and her antibiotics were broadened to vancomycin and Zosyn. She was on norepinephrine for several days d/t hypotension attributed to sedation & sepsis-related vasoplegia. It appears that she has been doing fairly long SBTs since 3/26, which seemed to be going reasonably well per RT documentation. Primary team has been having issues w/ progressively increasing FiO2 needs. She had a sputum culture grow Candida & has been on fluconazole since 3/25. She has remained on the insulin drip, presumably to manage the persistent hyperglycemia during her steroid burst (for the presumed COPD exacerbation). She has received a fairly large volume of documented fluids, & was started on diuresis 3/30. She was transferred to [facility] d/t concerns  that she will require a tracheostomy secondary to her inability to liberate from the vent.     Preoperative Diagnosis: Perineal and right groin infection     Postoperative Diagnosis: Perineal and right groin necrotizing soft tissue infection     Procedure: Incision and drainage of perineum and right groin     Findings: Infection traveling up the right labia into the right groin.  Final debridement included a right groin defect measuring 18 x 8 x 5 cm and a perineal wound measuring 6 x 2 x 1 cm.       Assessment:      Wound location: Right groin     4/7/25 4/9/25 4/25      Wound due to: Surgical Wound   Wound history/plan of care:    Surgical date: 4/2 and 4/8   Service following: General Surgery  Date Negative Pressure Wound Therapy initiated: 04/09/25   Interventions in place: moisture/incontinence management  Is patient s nutritional status compromised? no   If yes, what interventions are in place? N/A  Reason for initiating vac therapy? Presence of co-morbidities, High risk of infections, and Need for accelerated granulation tissue  Which?of?the?following?co-morbidities?apply? Diabetes, Immobility, and Obesity  If diabetic is patient on a diabetic management program? Yes   Is osteomyelitis present in wound? no   If yes what treatments are in place? N/A  Wound due to: Surgical Wound  Wound history/plan of care: I&D 4/2/25 with second debridement on 4/8/25  Wound base: see photo - mix of adipose and granular tissue     Palpation of the wound bed: normal      Drainage:  large     Description of drainage: serosanguinous     Measurements (length x width x depth, in cm): R groin wound measuring 6 cm x 17 cm x 3.5 cm     Tunneling: up to 3 cm at 9 o'clock; up to 4 cm between 4 - 5 o'clock     Undermining: from 12-2 o'clock; up to 4cm at deepest part  Periwound skin: Intact      Color: normal and consistent with surrounding tissue      Temperature: normal   Odor: none  Pain: facial expression of distress  intermittently during cares; oral pain meds given 45 minutes prior to dressing change, significant expressions of facial distress  Pain interventions prior to dressing change: slow and gentle cares  and distraction, pain meds, soaking dressing  Started white foam due to pain with foam removal  Treatment goal: Drainage control and Infection control/prevention  STATUS: improving  Supplies ordered: supplies stored on unit, discussed with RN, and discussed with patient      Number of foam pieces removed from a wound (excluding foam for bridge) : 2 pieces GranuFoam Black  Verified this matched the number of foam pieces applied last dressing change: Yes  Number of foam pieces packed into wound (excluding foam for bridge) : 1 piece white foam, 1 piece black foam    Pressure Injury Location: Bilateral buttocks     4/1 4/9  Last photo: 4/9  Wound type: Pressure Injury and Friction     Pressure Injury Stage: 2, present on admission versus IAD  Wound history/plan of care: There was a darker area to the gluteal cleft with some maceration; unclear if it is a developing DTPI but will monitor for changes - 4/9 Skin has sloughed off area over coccyx but remains partial--thickness at this time and measures 2 cm x 0.5 cm    Wound base: buttocks healed, dermis in gluteal cleft  Periwound skin: Intact      Color: normal and consistent with surrounding tissue      Temperature: normal   Calmoseptine  Treatment Plan:   Negative pressure wound therapy plan:  Wound location: Right groin   Change Days: Tues/ Fri   Supplies (including all accessories) used: medium Black foam  and White foam  Cleanse with Vashe prior to replacing NPWT  Suction setting: -125   Methods used: Window paned all periwound skin with vac drape prior to applying sponge, Placed barrier ring into periwound creases to improve seal, and place white foam in base, then black foam, Adapr Ring along medial  "edge  PRIOR TO REMOVAL: soak dressing, turn off VAC when giving pain meds, use adhesive remover on tape    Staff RN to assess integrity of dressing and ensure suction is set at appropriate level every shift.   Date canister. Chart canister output every shift. Change cannister weekly and PRN if full/occluded     Remove foam dressing and replace with BID normal saline moist gauze dressing if:   -a dressing failure which cannot be repaired within 2 hours   -patient is discharging to home without a home pump   -patient is discharging to a facility outside the local area   -if a dressing is a \"Silver Foam\", remove before Radiation Therapy or MRI     The hospital VAC pump is not to be discharged with the patient. Please disconnect the patient from the machine prior to discharge.  If a home VAC pump has been delivered, connect the home cannister to dressing tubing and the cannister to the home pump, turn on home pump  If the patient is transferring to a nearby facility with a VAC, the tubing can be disconnected, clamp tubing and cover the end with a glove, then can be reconnected if within 2 hours  If transfer will be longer than 2 hours, dressing must be removed and placed with a wet to moist gauze dressing for transfer       R labia - daily and prn with any contamination  Cleanse with Vashe and place 1 fluff of Vashe moistened gauze into wound bed    Internal FMS - OK to remove if less than 200 mL output in a 24 hour period    Bilateral buttocks wounds: Every 3 days   Cleanse the area with wound cleanser and pat dry.  Apply No sting film barrier to periwound skin.  Cover wound with Sacral Mepilex (#[id number])  Change dressing Q 3 days.  Turn and reposition Q 2hrs side to side only.  Ensure pt has Pablo-cushion while sitting up in the chair.  If not in ICU: Ensure air pump on bed and set to Isoflex.  FYI- If pt has constant incontinent loose stools needing dressing changes Q shift please discontinue the Mepilex dressing and " apply criticaid barrier paste BID and PRN.    Orders: Reviewed and Updated    RECOMMEND PRIMARY TEAM ORDER: None, at this time  Education provided: plan of care, wound progress, and Moisture management  Discussed plan of care with: Patient, Nurse, and Physicians Assistant  Notify [facility] if wound(s) deteriorate.  Nursing to notify the Provider(s) and re-consult the [facility] Nurse if new skin concern.    DATA:     Current support surface: Standard  Standard gel mattress (Isoflex)  Containment of urine/stool: Incontinence Protocol and Indwelling catheter  BMI: Body mass index is 31.62 kg/m .   Active diet order: Orders Placed This Encounter      Combination Diet Moderate Consistent Carb (60 g CHO per Meal) Diet; Easy to Chew (level 7); Thin Liquids (level 0)     Output: I/O last 3 completed shifts:  In: 723 [P.O.:720; I.V.:3]  Out: 2250 [Urine:2250]     Labs:   Recent Labs   Lab 04/14/25  0650 04/13/25  0214   ALBUMIN  --  2.9*   HGB 10.8* 10.5*   WBC 5.2 6.3     Pressure injury risk assessment:   Sensory Perception: 3-->slightly limited  Moisture: 3-->occasionally moist  Activity: 1-->bedfast  Mobility: 3-->slightly limited  Nutrition: 2-->probably inadequate  Friction and Shear: 1-->problem  Vicente Score: 13    Cira Aden, SHIRAN, RN, PHN, HNB-BC, CWOCN  Pager no longer is use, please contact through [employer] group: [facility]

## 2025-04-15 NOTE — CONSULTS
4/15 Test claim for Eliquis- covered medication covered 100% no cost for the patient. Thank you for allowing me to help with your patient  Nikkie Mayorga [facility]  Pharmacy Discharge Liaison   [facility] locations  Available on [facility] and [facility]

## 2025-04-15 NOTE — DISCHARGE INSTRUCTIONS
"[facility] DISCHARGE INSTRUCTIONS:  Negative pressure wound therapy plan:  Wound location: Right groin   Change Days: Tues/ Fri   Supplies (including all accessories) used: medium Black foam  and White foam  Cleanse with Vashe prior to replacing NPWT  Suction setting: -125   Methods used: Window paned all periwound skin with vac drape prior to applying sponge, Placed barrier ring into periwound creases to improve seal, and place white foam in base, then black foam, Adapr Ring along medial edge  PRIOR TO REMOVAL: soak dressing, turn off VAC when giving pain meds, use adhesive remover on tape    Staff RN to assess integrity of dressing and ensure suction is set at appropriate level every shift.   Date canister. Chart canister output every shift. Change cannister weekly and PRN if full/occluded     Remove foam dressing and replace with BID normal saline moist gauze dressing if:   -a dressing failure which cannot be repaired within 2 hours   -patient is discharging to home without a home pump   -patient is discharging to a facility outside the local area   -if a dressing is a \"Silver Foam\", remove before Radiation Therapy or MRI     The hospital VAC pump is not to be discharged with the patient. Please disconnect the patient from the machine prior to discharge.  If a home VAC pump has been delivered, connect the home cannister to dressing tubing and the cannister to the home pump, turn on home pump  If the patient is transferring to a nearby facility with a VAC, the tubing can be disconnected, clamp tubing and cover the end with a glove, then can be reconnected if within 2 hours  If transfer will be longer than 2 hours, dressing must be removed and placed with a wet to moist gauze dressing for transfer  "

## 2025-04-15 NOTE — PLAN OF CARE
Problem: Adult Inpatient Plan of Care  Goal: Plan of Care Review  Description: The Plan of Care Review/Shift note should be completed every shift.  The Outcome Evaluation is a brief statement about your assessment that the patient is improving, declining, or no change.  This information will be displayed automatically on your shiftnote.  Outcome: Progressing  Goal: Absence of Hospital-Acquired Illness or Injury  Intervention: Identify and Manage Fall Risk  Recent Flowsheet Documentation  Taken 4/15/2025 0037 by Shraddha Galeano RN  Safety Promotion/Fall Prevention:   activity supervised   lighting adjusted   room door open   room near nurse's station   Goal Outcome Evaluation:       Patient was A/O  x 4,  patient denied pain and slept most of the shift, patient wore the BIPAP from 0000 -0300, and went back to Nasal cannula on 4 liters, patient was educated on the importance of wearing the BiPAP  by the writer and RT but patient refused. Patient did not de sat, 02 sat was between 96-98% on this shift, patient was  repositioned every 2 hours, incontinent of BM x 1, All other patient needs were attended to.BP (!) 157/74 (BP Location: Left arm)   Pulse 92   Temp 97.9  F (36.6  C) (Axillary)   Resp 16   Wt 86.2 kg (190 lb)   SpO2 95%   BMI 31.62 kg/m

## 2025-04-16 ENCOUNTER — APPOINTMENT (OUTPATIENT)
Dept: PHYSICAL THERAPY | Facility: CLINIC | Age: 60
DRG: 602 | End: 2025-04-16
Attending: INTERNAL MEDICINE
Payer: COMMERCIAL

## 2025-04-16 ENCOUNTER — APPOINTMENT (OUTPATIENT)
Dept: OCCUPATIONAL THERAPY | Facility: CLINIC | Age: 60
DRG: 602 | End: 2025-04-16
Attending: INTERNAL MEDICINE
Payer: COMMERCIAL

## 2025-04-16 LAB
ANION GAP SERPL CALCULATED.3IONS-SCNC: 8 MMOL/L (ref 7–15)
BUN SERPL-MCNC: 41.1 MG/DL (ref 8–23)
CALCIUM SERPL-MCNC: 9.6 MG/DL (ref 8.8–10.4)
CHLORIDE SERPL-SCNC: 100 MMOL/L (ref 98–107)
CREAT SERPL-MCNC: 1.28 MG/DL (ref 0.51–0.95)
EGFRCR SERPLBLD CKD-EPI 2021: 47 ML/MIN/1.73M2
ERYTHROCYTE [DISTWIDTH] IN BLOOD BY AUTOMATED COUNT: 14.1 % (ref 10–15)
GLUCOSE BLDC GLUCOMTR-MCNC: 128 MG/DL (ref 70–99)
GLUCOSE BLDC GLUCOMTR-MCNC: 167 MG/DL (ref 70–99)
GLUCOSE BLDC GLUCOMTR-MCNC: 173 MG/DL (ref 70–99)
GLUCOSE BLDC GLUCOMTR-MCNC: 179 MG/DL (ref 70–99)
GLUCOSE SERPL-MCNC: 166 MG/DL (ref 70–99)
HCO3 SERPL-SCNC: 31 MMOL/L (ref 22–29)
HCT VFR BLD AUTO: 34 % (ref 35–47)
HGB BLD-MCNC: 10.8 G/DL (ref 11.7–15.7)
MAGNESIUM SERPL-MCNC: 1.7 MG/DL (ref 1.7–2.3)
MCH RBC QN AUTO: 30.8 PG (ref 26.5–33)
MCHC RBC AUTO-ENTMCNC: 31.8 G/DL (ref 31.5–36.5)
MCV RBC AUTO: 97 FL (ref 78–100)
PLATELET # BLD AUTO: 682 10E3/UL (ref 150–450)
POTASSIUM SERPL-SCNC: 4.5 MMOL/L (ref 3.4–5.3)
RBC # BLD AUTO: 3.51 10E6/UL (ref 3.8–5.2)
SODIUM SERPL-SCNC: 139 MMOL/L (ref 135–145)
WBC # BLD AUTO: 5.6 10E3/UL (ref 4–11)

## 2025-04-16 PROCEDURE — 97535 SELF CARE MNGMENT TRAINING: CPT | Mod: GO | Performed by: OCCUPATIONAL THERAPIST

## 2025-04-16 PROCEDURE — 82374 ASSAY BLOOD CARBON DIOXIDE: CPT | Performed by: STUDENT IN AN ORGANIZED HEALTH CARE EDUCATION/TRAINING PROGRAM

## 2025-04-16 PROCEDURE — 94640 AIRWAY INHALATION TREATMENT: CPT

## 2025-04-16 PROCEDURE — 99232 SBSQ HOSP IP/OBS MODERATE 35: CPT | Performed by: INTERNAL MEDICINE

## 2025-04-16 PROCEDURE — 250N000012 HC RX MED GY IP 250 OP 636 PS 637: Performed by: STUDENT IN AN ORGANIZED HEALTH CARE EDUCATION/TRAINING PROGRAM

## 2025-04-16 PROCEDURE — 97110 THERAPEUTIC EXERCISES: CPT | Mod: GP | Performed by: PHYSICAL THERAPIST

## 2025-04-16 PROCEDURE — 250N000011 HC RX IP 250 OP 636: Performed by: STUDENT IN AN ORGANIZED HEALTH CARE EDUCATION/TRAINING PROGRAM

## 2025-04-16 PROCEDURE — 250N000013 HC RX MED GY IP 250 OP 250 PS 637: Performed by: STUDENT IN AN ORGANIZED HEALTH CARE EDUCATION/TRAINING PROGRAM

## 2025-04-16 PROCEDURE — 999N000123 HC STATISTIC OXYGEN O2DAILY TECH TIME

## 2025-04-16 PROCEDURE — 83735 ASSAY OF MAGNESIUM: CPT | Performed by: STUDENT IN AN ORGANIZED HEALTH CARE EDUCATION/TRAINING PROGRAM

## 2025-04-16 PROCEDURE — 94660 CPAP INITIATION&MGMT: CPT

## 2025-04-16 PROCEDURE — 97530 THERAPEUTIC ACTIVITIES: CPT | Mod: GP | Performed by: PHYSICAL THERAPIST

## 2025-04-16 PROCEDURE — 94640 AIRWAY INHALATION TREATMENT: CPT | Mod: 76

## 2025-04-16 PROCEDURE — 120N000017 HC R&B RESPIRATORY CARE

## 2025-04-16 PROCEDURE — 36415 COLL VENOUS BLD VENIPUNCTURE: CPT | Performed by: STUDENT IN AN ORGANIZED HEALTH CARE EDUCATION/TRAINING PROGRAM

## 2025-04-16 PROCEDURE — 999N000157 HC STATISTIC RCP TIME EA 10 MIN

## 2025-04-16 PROCEDURE — 250N000009 HC RX 250: Performed by: STUDENT IN AN ORGANIZED HEALTH CARE EDUCATION/TRAINING PROGRAM

## 2025-04-16 RX ADMIN — ACETAMINOPHEN 975 MG: 325 TABLET, FILM COATED ORAL at 13:35

## 2025-04-16 RX ADMIN — UMECLIDINIUM BROMIDE AND VILANTEROL TRIFENATATE 1 PUFF: 62.5; 25 POWDER RESPIRATORY (INHALATION) at 08:58

## 2025-04-16 RX ADMIN — ALBUTEROL SULFATE 2.5 MG: 2.5 SOLUTION RESPIRATORY (INHALATION) at 14:47

## 2025-04-16 RX ADMIN — ANORECTAL OINTMENT: 15.7; .44; 24; 20.6 OINTMENT TOPICAL at 08:58

## 2025-04-16 RX ADMIN — INSULIN ASPART 2 UNITS: 100 INJECTION, SOLUTION INTRAVENOUS; SUBCUTANEOUS at 12:33

## 2025-04-16 RX ADMIN — PIPERACILLIN AND TAZOBACTAM 3.38 G: 3; .375 INJECTION, POWDER, FOR SOLUTION INTRAVENOUS at 13:36

## 2025-04-16 RX ADMIN — ATORVASTATIN CALCIUM 40 MG: 40 TABLET, FILM COATED ORAL at 21:59

## 2025-04-16 RX ADMIN — ACETAMINOPHEN 975 MG: 325 TABLET, FILM COATED ORAL at 21:58

## 2025-04-16 RX ADMIN — ACETAMINOPHEN 975 MG: 325 TABLET, FILM COATED ORAL at 06:23

## 2025-04-16 RX ADMIN — INSULIN ASPART 2 UNITS: 100 INJECTION, SOLUTION INTRAVENOUS; SUBCUTANEOUS at 08:57

## 2025-04-16 RX ADMIN — ANORECTAL OINTMENT: 15.7; .44; 24; 20.6 OINTMENT TOPICAL at 13:35

## 2025-04-16 RX ADMIN — SODIUM CHLORIDE SOLN NEBU 3% 3 ML: 3 NEBU SOLN at 07:50

## 2025-04-16 RX ADMIN — ALBUTEROL SULFATE 2.5 MG: 2.5 SOLUTION RESPIRATORY (INHALATION) at 19:27

## 2025-04-16 RX ADMIN — PIPERACILLIN AND TAZOBACTAM 3.38 G: 3; .375 INJECTION, POWDER, FOR SOLUTION INTRAVENOUS at 06:27

## 2025-04-16 RX ADMIN — ALBUTEROL SULFATE 2.5 MG: 2.5 SOLUTION RESPIRATORY (INHALATION) at 07:50

## 2025-04-16 RX ADMIN — APIXABAN 5 MG: 5 TABLET, FILM COATED ORAL at 08:57

## 2025-04-16 RX ADMIN — PIPERACILLIN AND TAZOBACTAM 3.38 G: 3; .375 INJECTION, POWDER, FOR SOLUTION INTRAVENOUS at 22:14

## 2025-04-16 RX ADMIN — INSULIN ASPART 15 UNITS: 100 INJECTION, SOLUTION INTRAVENOUS; SUBCUTANEOUS at 12:33

## 2025-04-16 RX ADMIN — APIXABAN 5 MG: 5 TABLET, FILM COATED ORAL at 21:59

## 2025-04-16 RX ADMIN — INSULIN ASPART 12 UNITS: 100 INJECTION, SOLUTION INTRAVENOUS; SUBCUTANEOUS at 18:04

## 2025-04-16 RX ADMIN — INSULIN ASPART 11 UNITS: 100 INJECTION, SOLUTION INTRAVENOUS; SUBCUTANEOUS at 09:00

## 2025-04-16 RX ADMIN — AMLODIPINE BESYLATE 5 MG: 5 TABLET ORAL at 08:57

## 2025-04-16 RX ADMIN — ANORECTAL OINTMENT: 15.7; .44; 24; 20.6 OINTMENT TOPICAL at 21:59

## 2025-04-16 RX ADMIN — SODIUM CHLORIDE SOLN NEBU 3% 3 ML: 3 NEBU SOLN at 19:27

## 2025-04-16 RX ADMIN — INSULIN GLARGINE 30 UNITS: 100 INJECTION, SOLUTION SUBCUTANEOUS at 22:00

## 2025-04-16 ASSESSMENT — ACTIVITIES OF DAILY LIVING (ADL)
ADLS_ACUITY_SCORE: 64
ADLS_ACUITY_SCORE: 65
ADLS_ACUITY_SCORE: 64
ADLS_ACUITY_SCORE: 65
ADLS_ACUITY_SCORE: 64
ADLS_ACUITY_SCORE: 65
ADLS_ACUITY_SCORE: 65
ADLS_ACUITY_SCORE: 67
ADLS_ACUITY_SCORE: 65
ADLS_ACUITY_SCORE: 67
ADLS_ACUITY_SCORE: 65
ADLS_ACUITY_SCORE: 65
ADLS_ACUITY_SCORE: 67
ADLS_ACUITY_SCORE: 65

## 2025-04-16 ASSESSMENT — VISUAL ACUITY: OU: NOT TESTED

## 2025-04-16 NOTE — PROGRESS NOTES
[facility]    Medicine Progress Note - Hospitalist Service    Date of Admission:  4/11/2025    Assessment & Plan     Hospital Course  CC: Acute onset dyspnea    HPI: Marly Cannon is a 59 year old woman with history of intellectual disability & memory issues, COPD on supplemental oxygen, untreated ADEEL, tobacco use d/o, HTN, pAFib, & poorly controlled T2DM s/p previous hospitalization for DKA (3/18 - 3/20), who presented again to the hospital in [place] on 3/22/2025 with complaints of dyspnea, hyperglycemia, and chest pain. She was found to have an YADIRA, LLL PE, DKA, and A-fib with RVR. She was started on an insulin drip, therapeutic dose enoxaparin, Zosyn for possible pneumonia, and diltiazem drip for management of her RVR. Her DKA resolved, she was switched from diltiazem to amiodarone drip for management of A-fib with RVR; however, her degree of respiratory failure was still concerning and she was intubated 3/23 for management of worsening hypoxia. She had a repeat CTA that demonstrated progression of her PE and RLL collapse concerning for mucous plugging and pneumonia.   She was switched from enoxaparin to heparin drip and her antibiotics were broadened to vancomycin and Zosyn. She was on norepinephrine for several days d/t hypotension attributed to sedation & sepsis-related vasoplegia. It appears that she has been doing fairly long SBTs since 3/26, which seemed to be going reasonably well per RT documentation. Primary team has been having issues w/ progressively increasing FiO2 needs. She had a sputum culture grow Candida & has been on fluconazole since 3/25. She has remained on the insulin drip, presumably to manage the persistent hyperglycemia during her steroid burst (for the presumed COPD exacerbation). She has received a fairly large volume of documented fluids, & was started on diuresis 3/30. She was transferred to [facility] d/t concerns that she will require a tracheostomy secondary to her inability  to liberate from the vent.     LTACH Course  She transferred from [facility] to [place] for wound cares, therapies, and complex medical care.     4/12-4/14: Needing BiPAP overnight., chronic 2 liters O2 during the day    DVT Prophylaxis: Apixaban 5 mg po bid     Active LTACH PROBLEMS  Acute on Chronic Hypoxic Respiratory Failure  VAP, PTA  COPD, severe  ADEEL  - Continue zosyn through 4/17  - inhalers as needed  - weaned off HFNC, now NC and Bipap at night. She is normally at 2 LPM supplemental oxygen at home.   - RT following     Perineal Abscess  Condyloma Acuminata  s/p acyclovir and I&D x3  - zosyn through 4/17  - rectal tube in place for wound protection - will need holiday to avoid rectal ulcer  - noguera for wound  - VAC in place  - Continue wound care per [facility] nurse    PE, Acute, segmental, subsegmental  - On apixaban     T2DM  Poorly controlled with A1c 12.7. Used to be on insulin pump. No longer on it due to intellectual delay.  BG labile since admission.   - Continue Lantus 30 u daily  - I:C of 1:5 with meals  - ISS moderate    CKD3b  - Creatinine stable. Monitor    History of intellectual delay: supportive care           Diet: Snacks/Supplements Adult: Expedite Cup; With Meals  Snacks/Supplements Adult: Magic Cup; With Meals  Snacks/Supplements Adult: Gelatein 20 (sugar-free); With Meals  Combination Diet Moderate Consistent Carb (60 g CHO per Meal) Diet; Easy to Chew (level 7); Thin Liquids (level 0)    Noguera Catheter: PRESENT, indication: Wound deterioration and failed external collection device  Lines: None     Cardiac Monitoring: None  Code Status: Full Code      Clinically Significant Risk Factors               # Hypoalbuminemia: Lowest albumin = 2.9 g/dL at 4/13/2025  2:14 AM, will monitor as appropriate     # Hypertension: Noted on problem list           # DMII: A1C = 12.7 % (Ref range: <5.7 %) within past 6 months   # Obesity: Estimated body mass index is 31.62 kg/m  as calculated from the  "following:    Height as of 3/22/25: 1.651 m (5' 5\").    Weight as of this encounter: 86.2 kg (190 lb).      # COPD: noted on problem list        Social Drivers of Health    Tobacco Use: Medium Risk (4/2/2025)    Patient History     Smoking Tobacco Use: Former     Smokeless Tobacco Use: Never     Passive Exposure: Current   Physical Activity: Unknown (5/16/2024)    Exercise Vital Sign     Days of Exercise per Week: 0 days   Social Connections: Unknown (5/16/2024)    Social Connection and Isolation Panel [NHANES]     Frequency of Social Gatherings with Friends and Family: More than three times a week          Disposition Plan     Medically Ready for Discharge: Anticipated in 5+ Days             Andrez Mueller MD  Hospitalist Service  LTACH  Securely message with [employer] (more info)  Text page via [employer] Paging/Directory   ______________________________________________________________________    Interval History   Patient feels well this am, good appetite, denies dyspnea, minimal pain    Physical Exam   Vital Signs: Temp: 98.8  F (37.1  C) Temp src: Oral BP: 122/67 Pulse: 83   Resp: 16 SpO2: 97 % O2 Device: BiPAP/CPAP Oxygen Delivery: 2 LPM  Weight: 190 lbs 0 oz    Vitals and nursing note reviewed.   Constitutional:  Well developed, obese adult female, in no acute distress   HEENT:      Eyes: Vision grossly intact, Conjunctivae clear without bleeding and without jaundice.      Head: Normocephalic and atraumatic.      Nares: without obstruction, bleeding or discharge.     Mouth/throat: gums without bleeding. No ulcers, no swelling  Skin: Skin is warm and well perfused.   Pulmonary: Lungs clear to auscultation bilaterally  Cardiac: RRR, no clinically significant murmur, gallop, rub  GI: Soft, NT, BS+  Neurological: Face is symmetric. Speech fluent, clear, appropriate. Oriented to self, time and place. Moves all extremities equally, 5/5 strength bilaterally.       Medical Decision Making       35 MINUTES SPENT BY ME on the " date of service doing chart review, history, exam, documentation & further activities per the note.      Data   ------------------------- PAST 24 HR DATA REVIEWED -----------------------------------------------    I have personally reviewed the following data over the past 24 hrs:    5.6  \   10.8 (L)   / 682 (H)     139 100 41.1 (H) /  167 (H)   4.5 31 (H) 1.28 (H) \       Imaging results reviewed over the past 24 hrs:   No results found for this or any previous visit (from the past 24 hours).

## 2025-04-16 NOTE — PLAN OF CARE
Problem: Adult Inpatient Plan of Care  Goal: Plan of Care Review  Description: The Plan of Care Review/Shift note should be completed every shift.  The Outcome Evaluation is a brief statement about your assessment that the patient is improving, declining, or no change.  This information will be displayed automatically on your shiftnote.  4/16/2025 0708 by Katherine Marinelli RN  Outcome: Progressing  4/16/2025 0708 by Katherine Marinelli RN  Outcome: Progressing   Goal Outcome Evaluation:        Pt wears 2 liters NC oxygen during waking hrs & Bi-PAP @ 26% overnight. She tolerates wearing Bi-PAP mask w/o any complaints. Wound vac (R) groin puts out scant amt of drainage. Landrum output = 1050 yellow urine. Has some edema in both lower extremities. Affect has been flat, pt able to take all meds whole with water. Slept soundly t/o the night & denies having any pain.

## 2025-04-16 NOTE — PROVIDER NOTIFICATION
04/16/25 0016   Mode: CPAP/ BiPAP/ AVAPS/ AVAPS AE   CPAP/BiPAP/ AVAPS/ AVAPS AE Mode BiPAP S/T   Equipment   Device V60   CPAP/BiPAP/Settings   $CPAP/BiPAP Subsequent completed   BIPAP/CPAP On Standby On   IPAP/EPAP (cmH2O) 18/10   Rate (breaths/min) 16   Oxygen (%) 26   Timed Inspiration (sec) 0.9   IPAP RISE  Settings (V60) 2   CPAP/BiPAP Patient Parameters   RR Total (breaths/min) 16 breaths/min   Vt (mL) 561 mL   Minute Ventilation (L/min) 9.6 L/min   Peak Inspiratory Pressure (cm H2O) 19 cmH2O   Pt.  Leak (L/min) 15 L/min   CPAP/BiPAP/AVAPS/AVAPS AE Alarms   High Pressure (cm H2O) 25 cmH2O   Low Pressure Delay (sec) 20 sec   Apnea (sec) 20   Lo Min Vent 3   High Rate (breaths/min) 45 breaths/min   Low Rate (breaths/min) 16   Audible Alarm set at (Volume of Alarm) 7   Humidifier Checked N/A   RT Device Skin Assessment   Oxygen Delivery Device CPAP/BiPAP Mask   Interface Face Mask - Small   Strap Tightness Finger Allowance between head and device strap   Respiratory WDL   Respiratory WDL WDL   Rhythm/Pattern, Respiratory depth regular;pattern regular;no shortness of breath reported   Expansion/Accessory Muscles/Retractions no use of accessory muscles   Ambu at Bedside present   Breath Sounds   Breath Sounds All Fields   All Lung Fields Breath Sounds clear;diminished     Patient placed on the BIPAP for sleep, tolerating it well. Will continue to monitor.

## 2025-04-16 NOTE — PLAN OF CARE
"  Problem: Adult Inpatient Plan of Care  Goal: Plan of Care Review  Description: The Plan of Care Review/Shift note should be completed every shift.  The Outcome Evaluation is a brief statement about your assessment that the patient is improving, declining, or no change.  This information will be displayed automatically on your shiftnote.  Outcome: Progressing  Goal: Patient-Specific Goal (Individualized)  Description: You can add care plan individualizations to a care plan. Examples of Individualization might be:  \"Parent requests to be called daily at 9am for status\", \"I have a hard time hearing out of my right ear\", or \"Do not touch me to wake me up as it startlesme\".  Outcome: Progressing  Goal: Absence of Hospital-Acquired Illness or Injury  Outcome: Progressing  Goal: Optimal Comfort and Wellbeing  Outcome: Progressing  Goal: Readiness for Transition of Care  Outcome: Progressing     Problem: Pain Acute  Goal: Optimal Pain Control and Function  Outcome: Progressing  Intervention: Optimize Psychosocial Wellbeing  Recent Flowsheet Documentation  Taken 4/16/2025 1145 by Jazmin So RN  Diversional Activities: television     Problem: Wound  Goal: Optimal Coping  Outcome: Progressing  Intervention: Support Patient and Family Response  Recent Flowsheet Documentation  Taken 4/16/2025 1145 by Jazmin So RN  Family/Support System Care: (not t bedside) other (see comments)  Goal: Optimal Functional Ability  Outcome: Progressing  Goal: Absence of Infection Signs and Symptoms  Outcome: Progressing  Goal: Improved Oral Intake  Outcome: Progressing  Goal: Optimal Pain Control and Function  Outcome: Progressing  Goal: Skin Health and Integrity  Outcome: Progressing  Goal: Optimal Wound Healing  Outcome: Progressing     Problem: Infection  Goal: Absence of Infection Signs and Symptoms  Outcome: Progressing   Goal Outcome Evaluation:             Vital signs stable, Temp: 98.1  F (36.7  C) Temp src: Oral BP: " 133/72 Pulse: 88   Resp: 18 SpO2: 94 % O2 Device: Nasal cannula with humidification Oxygen Delivery: 1 LPM  Patient presented with a flat affect, cooperative with all medication and cares. Alert and oriented x4, able to communicate needs effectively. Landrum is patent, incontinent of stool. Right labia and sacral wound cares done. Denied having any pain or discomfort. Good food and fluid intake.     Jazmin So RN

## 2025-04-17 ENCOUNTER — APPOINTMENT (OUTPATIENT)
Dept: OCCUPATIONAL THERAPY | Facility: CLINIC | Age: 60
DRG: 602 | End: 2025-04-17
Attending: INTERNAL MEDICINE
Payer: COMMERCIAL

## 2025-04-17 ENCOUNTER — APPOINTMENT (OUTPATIENT)
Dept: PHYSICAL THERAPY | Facility: CLINIC | Age: 60
End: 2025-04-17
Attending: INTERNAL MEDICINE
Payer: COMMERCIAL

## 2025-04-17 LAB
BACTERIA BRONCH: NORMAL
CREAT SERPL-MCNC: 1.41 MG/DL (ref 0.51–0.95)
EGFRCR SERPLBLD CKD-EPI 2021: 42 ML/MIN/1.73M2
GLUCOSE BLDC GLUCOMTR-MCNC: 133 MG/DL (ref 70–99)
GLUCOSE BLDC GLUCOMTR-MCNC: 149 MG/DL (ref 70–99)
GLUCOSE BLDC GLUCOMTR-MCNC: 173 MG/DL (ref 70–99)
GLUCOSE BLDC GLUCOMTR-MCNC: 188 MG/DL (ref 70–99)
PLATELET # BLD AUTO: 692 10E3/UL (ref 150–450)

## 2025-04-17 PROCEDURE — 250N000011 HC RX IP 250 OP 636: Performed by: STUDENT IN AN ORGANIZED HEALTH CARE EDUCATION/TRAINING PROGRAM

## 2025-04-17 PROCEDURE — 97535 SELF CARE MNGMENT TRAINING: CPT | Mod: GO | Performed by: OCCUPATIONAL THERAPIST

## 2025-04-17 PROCEDURE — 250N000013 HC RX MED GY IP 250 OP 250 PS 637: Performed by: STUDENT IN AN ORGANIZED HEALTH CARE EDUCATION/TRAINING PROGRAM

## 2025-04-17 PROCEDURE — 94640 AIRWAY INHALATION TREATMENT: CPT | Mod: 76

## 2025-04-17 PROCEDURE — 85049 AUTOMATED PLATELET COUNT: CPT | Performed by: STUDENT IN AN ORGANIZED HEALTH CARE EDUCATION/TRAINING PROGRAM

## 2025-04-17 PROCEDURE — 97110 THERAPEUTIC EXERCISES: CPT | Mod: GP

## 2025-04-17 PROCEDURE — 94660 CPAP INITIATION&MGMT: CPT

## 2025-04-17 PROCEDURE — 250N000009 HC RX 250: Performed by: STUDENT IN AN ORGANIZED HEALTH CARE EDUCATION/TRAINING PROGRAM

## 2025-04-17 PROCEDURE — 120N000017 HC R&B RESPIRATORY CARE

## 2025-04-17 PROCEDURE — 94640 AIRWAY INHALATION TREATMENT: CPT

## 2025-04-17 PROCEDURE — 82565 ASSAY OF CREATININE: CPT | Performed by: STUDENT IN AN ORGANIZED HEALTH CARE EDUCATION/TRAINING PROGRAM

## 2025-04-17 PROCEDURE — 99232 SBSQ HOSP IP/OBS MODERATE 35: CPT | Performed by: INTERNAL MEDICINE

## 2025-04-17 PROCEDURE — 999N000157 HC STATISTIC RCP TIME EA 10 MIN

## 2025-04-17 PROCEDURE — 36415 COLL VENOUS BLD VENIPUNCTURE: CPT | Performed by: STUDENT IN AN ORGANIZED HEALTH CARE EDUCATION/TRAINING PROGRAM

## 2025-04-17 RX ADMIN — ACETAMINOPHEN 975 MG: 325 TABLET, FILM COATED ORAL at 21:01

## 2025-04-17 RX ADMIN — ALBUTEROL SULFATE 2.5 MG: 2.5 SOLUTION RESPIRATORY (INHALATION) at 19:10

## 2025-04-17 RX ADMIN — ANORECTAL OINTMENT: 15.7; .44; 24; 20.6 OINTMENT TOPICAL at 21:05

## 2025-04-17 RX ADMIN — APIXABAN 5 MG: 5 TABLET, FILM COATED ORAL at 08:25

## 2025-04-17 RX ADMIN — ANORECTAL OINTMENT: 15.7; .44; 24; 20.6 OINTMENT TOPICAL at 14:09

## 2025-04-17 RX ADMIN — INSULIN ASPART 11 UNITS: 100 INJECTION, SOLUTION INTRAVENOUS; SUBCUTANEOUS at 09:07

## 2025-04-17 RX ADMIN — ALBUTEROL SULFATE 2.5 MG: 2.5 SOLUTION RESPIRATORY (INHALATION) at 07:44

## 2025-04-17 RX ADMIN — PIPERACILLIN AND TAZOBACTAM 3.38 G: 3; .375 INJECTION, POWDER, FOR SOLUTION INTRAVENOUS at 06:28

## 2025-04-17 RX ADMIN — ANORECTAL OINTMENT: 15.7; .44; 24; 20.6 OINTMENT TOPICAL at 08:25

## 2025-04-17 RX ADMIN — INSULIN ASPART 19 UNITS: 100 INJECTION, SOLUTION INTRAVENOUS; SUBCUTANEOUS at 12:51

## 2025-04-17 RX ADMIN — INSULIN GLARGINE 30 UNITS: 100 INJECTION, SOLUTION SUBCUTANEOUS at 21:04

## 2025-04-17 RX ADMIN — ACETAMINOPHEN 975 MG: 325 TABLET, FILM COATED ORAL at 14:10

## 2025-04-17 RX ADMIN — SODIUM CHLORIDE SOLN NEBU 3% 3 ML: 3 NEBU SOLN at 07:44

## 2025-04-17 RX ADMIN — UMECLIDINIUM BROMIDE AND VILANTEROL TRIFENATATE 1 PUFF: 62.5; 25 POWDER RESPIRATORY (INHALATION) at 08:26

## 2025-04-17 RX ADMIN — INSULIN ASPART 2 UNITS: 100 INJECTION, SOLUTION INTRAVENOUS; SUBCUTANEOUS at 12:51

## 2025-04-17 RX ADMIN — ALBUTEROL SULFATE 2.5 MG: 2.5 SOLUTION RESPIRATORY (INHALATION) at 15:10

## 2025-04-17 RX ADMIN — ATORVASTATIN CALCIUM 40 MG: 40 TABLET, FILM COATED ORAL at 21:01

## 2025-04-17 RX ADMIN — INSULIN ASPART 14 UNITS: 100 INJECTION, SOLUTION INTRAVENOUS; SUBCUTANEOUS at 17:42

## 2025-04-17 RX ADMIN — ACETAMINOPHEN 975 MG: 325 TABLET, FILM COATED ORAL at 06:28

## 2025-04-17 RX ADMIN — APIXABAN 5 MG: 5 TABLET, FILM COATED ORAL at 21:01

## 2025-04-17 RX ADMIN — INSULIN ASPART 2 UNITS: 100 INJECTION, SOLUTION INTRAVENOUS; SUBCUTANEOUS at 08:23

## 2025-04-17 RX ADMIN — AMLODIPINE BESYLATE 5 MG: 5 TABLET ORAL at 08:25

## 2025-04-17 RX ADMIN — PIPERACILLIN AND TAZOBACTAM 3.38 G: 3; .375 INJECTION, POWDER, FOR SOLUTION INTRAVENOUS at 21:46

## 2025-04-17 RX ADMIN — SODIUM CHLORIDE SOLN NEBU 3% 3 ML: 3 NEBU SOLN at 19:11

## 2025-04-17 RX ADMIN — PIPERACILLIN AND TAZOBACTAM 3.38 G: 3; .375 INJECTION, POWDER, FOR SOLUTION INTRAVENOUS at 15:02

## 2025-04-17 ASSESSMENT — ACTIVITIES OF DAILY LIVING (ADL)
ADLS_ACUITY_SCORE: 67
ADLS_ACUITY_SCORE: 64
ADLS_ACUITY_SCORE: 67
ADLS_ACUITY_SCORE: 64
ADLS_ACUITY_SCORE: 65
ADLS_ACUITY_SCORE: 67
ADLS_ACUITY_SCORE: 67
ADLS_ACUITY_SCORE: 65
ADLS_ACUITY_SCORE: 65
ADLS_ACUITY_SCORE: 67
ADLS_ACUITY_SCORE: 65
ADLS_ACUITY_SCORE: 67
ADLS_ACUITY_SCORE: 67
ADLS_ACUITY_SCORE: 64
ADLS_ACUITY_SCORE: 67
ADLS_ACUITY_SCORE: 64
ADLS_ACUITY_SCORE: 64
ADLS_ACUITY_SCORE: 67

## 2025-04-17 ASSESSMENT — VISUAL ACUITY: OU: NOT TESTED

## 2025-04-17 NOTE — PROGRESS NOTES
04/16/25 1106   Appointment Info   Signing Clinician's Name / Credentials (PT) WILIAM Patiño   Rehab Comments (PT) MIK noguera, NC 1L, wound vac   Therapeutic Procedure/Exercise   Ther. Procedure: strength, endurance, ROM, flexibillity Minutes (63301) 10   Symptoms Noted During/After Treatment none   Treatment Detail/Skilled Intervention Pt performed the following exercises in supine in order to improve B LE strength: 15 reps each of heel slides, SAQs, and hip abduction, hip adduction with pillow in between knees, and ankle pumps. VCs required for correct technique of exercises. Therapist hand underneath pt heel to reduce friction with heel slides. Attempted to have pt perform SLR, however pt achieves minimal AROM.   Therapeutic Activity   Therapeutic Activities: dynamic activities to improve functional performance Minutes (25975) 20   Symptoms Noted During/After Treatment Fatigue   Treatment Detail/Skilled Intervention Pt in bed upon arrival. Extended time spent managing lines prior to initiating movement in and out of bed. Rolling to L with SBA. Supine to sit with Phil, assist needed with LE progression off bed and getting trunk upright. Pt able to sit EOB with SBA with UE support from bedrails or behind pt. VCs to lean forward while sitting to prevent posterior trunk lean. Sit to stand x 3 with podium walker with CGA, seated rest break between each stand. Pt requiring Phil on 3rd stand. Had pt sit to stand to FWW with Phil. Pt required cues for forward trunk lean to stand, and cues for hand placement on walker and seat/bed before sitting. Stand pivot to recliner with CGAx2 due to pt level of comfort. TotalA for getting pt bottom squared in the back of seat.   PT Discharge Planning   PT Plan bed mobility, transfers, gait, stairs, nustep   PT Discharge Recommendation (DC Rec) Transitional Care Facility;home with home care physical therapy   PT Rationale for DC Rec Pt requires assist for safe functional  mobility.   PT Brief overview of current status Pt demonstrates improved standing mechanics with sit to stands into walker, and able to stand pivot to recliner CGA. Plan next session to amb with walker.   Physical Therapy Time and Intention   Timed Code Treatment Minutes 30   Total Session Time (sum of timed and untimed services) 30     This note was created for Utilization Review purposes only and the services rendered in this note are not a reflection of services provided by this author.

## 2025-04-17 NOTE — PROGRESS NOTES
04/16/25 1005   Appointment Info   Signing Clinician's Name / Credentials (OT) Jose Antonio Martines, OTR/L   Self-Care/Home Management   Self-Care/Home Mgmt/ADL, Compensatory, Meal Prep Minutes (31393) 43   Symptoms Noted During/After Treatment (Meal Preparation/Planning Training) fatigue   Treatment Detail/Skilled Intervention Facililtated ADL routine to progess activity tolerance and IND. Pt presents w/ delayed repsonse and flat affect. Agreed to partic w/ ADL at EOB. LB hyg total A.Demo rolling for peris cares and pulling pants over hips w/ CGA, cues for tech. Supine to sit min A-CGA> Assist to bring BLE on/off bed. Pt completed UB hyg/grooming after setup and cues ofr seq/thoroughness. STS  w/ FWW CGA, bed elevated for ease. Returned to supine at end of session, reports mod fatigue.   [place] Level (Grooming Training) stand-by assist   Assistance (Grooming Training) set-up required;verbal cues   [place] Level (Bathing Training) contact guard   Assistance (Bathing Training) set-up required;verbal cues   [place] Level (Upper Body Dressing Training) stand-by assist   Assistance (Upper Body Dressing Training) set-up required;verbal cues   Lower Body Dressing Training Assistance dependent (less than 25% patient effort)   Lower Body Dressing Training Assistance 1 person assist   OT Discharge Planning   OT Plan 4/16: ADLs EOB and progress to standing at sink, ACE III, UB ex, bed mobility   OT Discharge Recommendation (DC Rec) Transitional Care Facility   OT Rationale for DC Rec pt may require further therapy prior to dc to home.   OT Brief overview of current status Pt partic 100%, required cues for initiation and rest breaks to manage fatigue. Cont OT/POC   Total Session Time   Timed Code Treatment Minutes 43   Total Session Time (sum of timed and untimed services) 43     This note was created for Utilization Review purposes only and the services rendered in this note are not a reflection of services  provided by this author.

## 2025-04-17 NOTE — PROGRESS NOTES
[facility]    Medicine Progress Note - Hospitalist Service    Date of Admission:  4/11/2025    Assessment & Plan   Acute Care Hospital Course  PMHx: Marly Cannon is a 59 year old woman with history of intellectual disability & memory issues, COPD on supplemental oxygen, untreated ADEEL, tobacco use d/o, HTN, pAFib, & poorly controlled T2DM s/p previous hospitalization for DKA (3/18 - 3/20).  CC: Acute onset dyspnea  HPI: Presented again to hospital in [place] on 3/22/2025 with complaints of dyspnea, hyperglycemia, and chest pain. She was found to have an YADIRA, LLL PE, DKA, and A-fib with RVR. She was started on an insulin drip, therapeutic dose enoxaparin, Zosyn for possible pneumonia, and diltiazem drip for management of her RVR. Her DKA resolved, she was switched from diltiazem to amiodarone drip for management of A-fib with RVR; however, her degree of respiratory failure was still concerning and she was intubated 3/23 for management of worsening hypoxia. She had a repeat CTA that demonstrated progression of her PE and RLL collapse concerning for mucous plugging and pneumonia.   She was switched from enoxaparin to heparin drip and her antibiotics were broadened to vancomycin and Zosyn. She was on norepinephrine for several days d/t hypotension attributed to sedation & sepsis-related vasoplegia. It appears that she has been doing fairly long SBTs since 3/26, which seemed to be going reasonably well per RT documentation. Primary team has been having issues w/ progressively increasing FiO2 needs. She had a sputum culture grow Candida & has been on fluconazole since 3/25. She has remained on the insulin drip, presumably to manage the persistent hyperglycemia during her steroid burst (for the presumed COPD exacerbation). She has received a fairly large volume of documented fluids, & was started on diuresis 3/30. She was transferred to [facility] d/t concerns that she will require a tracheostomy secondary to her  inability to liberate from the vent.     DVT Prophylaxis: Apixaban 5 mg po bid    LTACH Course  She transferred from [facility] to [place] for wound cares, therapies, and complex medical care.     4/12-4/14: Needing BiPAP overnight.    4/17: attempt to wean O2 off during the day     Active LTACH PROBLEMS  Acute on Chronic Hypoxic Respiratory Failure  VAP, PTA  COPD  ADEEL  - Continue zosyn through 4/17  - inhalers as needed  - weaned off HFNC, now NC and Bipap at night. She is normally at 2 LPM supplemental oxygen at home.   - will attempt wean off O2 during the day, keep sats >= 88%  - RT following     Perineal Abscess  Condyloma Acuminata  s/p acyclovir and I&D x3  - zosyn through 4/17  - rectal tube in place for wound protection - will need holiday to avoid rectal ulcer  - noguera for wound  - VAC in place  - Continue wound care per [facility] nurse    PE, Acute, segmental, subsegmental  - On apixaban     T2DM  Poorly controlled with A1c 12.7. Used to be on insulin pump. No longer on it due to intellectual delay.  - BG labile earlier during admission, now stabilizing   - Continue Lantus 30 u daily  - I:C of 1:5 with meals  - ISS moderate    CKD3b  - Creatinine stable. Monitor    History of intellectual delay: supportive care           Diet: Snacks/Supplements Adult: Expedite Cup; With Meals  Snacks/Supplements Adult: Magic Cup; With Meals  Snacks/Supplements Adult: Gelatein 20 (sugar-free); With Meals  Combination Diet Moderate Consistent Carb (60 g CHO per Meal) Diet; Easy to Chew (level 7); Thin Liquids (level 0)    Noguera Catheter: PRESENT, indication: Wound deterioration and failed external collection device  Lines: None     Cardiac Monitoring: None  Code Status: Full Code      Clinically Significant Risk Factors               # Hypoalbuminemia: Lowest albumin = 2.9 g/dL at 4/13/2025  2:14 AM, will monitor as appropriate     # Hypertension: Noted on problem list           # DMII: A1C = 12.7 % (Ref range: <5.7 %) within  "past 6 months   # Obesity: Estimated body mass index is 31.38 kg/m  as calculated from the following:    Height as of 3/22/25: 1.651 m (5' 5\").    Weight as of this encounter: 85.5 kg (188 lb 9.6 oz).      # COPD: noted on problem list        Social Drivers of Health    Tobacco Use: Medium Risk (4/2/2025)    Patient History     Smoking Tobacco Use: Former     Smokeless Tobacco Use: Never     Passive Exposure: Current   Physical Activity: Unknown (5/16/2024)    Exercise Vital Sign     Days of Exercise per Week: 0 days   Social Connections: Unknown (5/16/2024)    Social Connection and Isolation Panel [NHANES]     Frequency of Social Gatherings with Friends and Family: More than three times a week          Disposition Plan     Medically Ready for Discharge: Anticipated in 5+ Days             Andrez Mueller MD  Hospitalist Service  LTACH  Securely message with [email or web address] (more info)  Text page via [employer] Paging/Directory   ______________________________________________________________________    Interval History   Patient slept well, good appetite, able to exercise with PT.    Physical Exam   Vital Signs: Temp: 98.1  F (36.7  C) Temp src: Oral BP: 137/71 Pulse: 87   Resp: 20 SpO2: 99 % O2 Device: None (Room air) Oxygen Delivery: 1 LPM  Weight: 188 lbs 9.6 oz    Vitals and nursing note reviewed.   Constitutional:  Well developed, obese adult female, in no acute distress   HEENT:      Eyes: Vision grossly intact, Conjunctivae clear without bleeding and without jaundice.      Head: Normocephalic and atraumatic.      Nares: without obstruction, bleeding or discharge.     Mouth/throat: gums without bleeding. No ulcers, no swelling  Skin: Skin is warm and well perfused.   Pulmonary: Lungs clear to auscultation bilaterally  Cardiac: RRR, no clinically significant murmur, gallop, rub  GI: Soft, NT, BS+  Neurological: Face is symmetric. Speech fluent, clear, appropriate. Oriented to self, time and place. Moves all extremities " equally, 5/5 strength bilaterally.       Medical Decision Making       35 MINUTES SPENT BY ME on the date of service doing chart review, history, exam, documentation & further activities per the note.      Data   ------------------------- PAST 24 HR DATA REVIEWED -----------------------------------------------    I have personally reviewed the following data over the past 24 hrs:    N/A  \   N/A   / 692 (H)     N/A N/A N/A /  173 (H)   N/A N/A 1.41 (H) \       Imaging results reviewed over the past 24 hrs:   No results found for this or any previous visit (from the past 24 hours).

## 2025-04-17 NOTE — PLAN OF CARE
Problem: Pain Acute  Goal: Optimal Pain Control and Function  Outcome: Progressing  Intervention: Optimize Psychosocial Wellbeing  Recent Flowsheet Documentation  Taken 4/17/2025 1626 by Jazmin So RN  Supportive Measures:   active listening utilized   self-care encouraged   verbalization of feelings encouraged  Diversional Activities: television  Taken 4/17/2025 0900 by Jazmin So RN  Supportive Measures:   active listening utilized   self-care encouraged   verbalization of feelings encouraged  Diversional Activities: television  Intervention: Prevent or Manage Pain  Recent Flowsheet Documentation  Taken 4/17/2025 1626 by Jazmin So RN  Sensory Stimulation Regulation:   quiet environment promoted   care clustered   lighting decreased   auditory stimulation minimized  Bowel Elimination Promotion: adequate fluid intake promoted  Medication Review/Management: medications reviewed  Taken 4/17/2025 0900 by Jazmin So RN  Sensory Stimulation Regulation:   quiet environment promoted   care clustered   lighting decreased   auditory stimulation minimized  Bowel Elimination Promotion: adequate fluid intake promoted  Medication Review/Management: medications reviewed   Goal Outcome Evaluation:

## 2025-04-17 NOTE — PROVIDER NOTIFICATION
04/16/25 2676   Mode: CPAP/ BiPAP/ AVAPS/ AVAPS AE   CPAP/BiPAP/ AVAPS/ AVAPS AE Mode BiPAP S/T   Equipment   Device V60   CPAP/BiPAP/Settings   BIPAP/CPAP On Standby On   IPAP/EPAP (cmH2O) 18/10   Rate (breaths/min) 16   Oxygen (%) 26   Timed Inspiration (sec) 0.9   IPAP RISE  Settings (V60) 2   CPAP/BiPAP Patient Parameters   RR Total (breaths/min) 17 breaths/min   Vt (mL) 637 mL   Minute Ventilation (L/min) 10.8 L/min   Peak Inspiratory Pressure (cm H2O) 18 cmH2O   CPAP/BiPAP/AVAPS/AVAPS AE Alarms   High Pressure (cm H2O) 25 cmH2O   Low Pressure (cm H2O) 2   Low Pressure Delay (sec) 50 sec   Apnea (sec) 20   Lo Min Vent 3   High Rate (breaths/min) 45 breaths/min   Low Rate (breaths/min) 16   Audible Alarm set at (Volume of Alarm) 7   Humidifier Checked N/A   RT Device Skin Assessment   Oxygen Delivery Device CPAP/BiPAP Mask   Interface Face Mask - Small   Strap Tightness Finger Allowance between head and device strap   Device Skin Interventions Taken Skin barrier applied;Strap adjusted to allow 1 finger between skin and device strap   Respiratory WDL   Respiratory WDL WDL   Rhythm/Pattern, Respiratory depth regular;pattern regular;no shortness of breath reported   Expansion/Accessory Muscles/Retractions no use of accessory muscles   Ambu at Bedside present     /79 (BP Location: Left arm)   Pulse 91   Temp 98.6  F (37  C) (Axillary)   Resp 17   Wt 86.2 kg (190 lb)   SpO2 97%   BMI 31.62 kg/m   On 1L nasal cannula day, BIPAP at night. BS clear and diminished. Albuterol TID. RT will continue to monitor.

## 2025-04-17 NOTE — PLAN OF CARE
Problem: Adult Inpatient Plan of Care  Goal: Absence of Hospital-Acquired Illness or Injury  Intervention: Identify and Manage Fall Risk  Recent Flowsheet Documentation  Taken 4/17/2025 0900 by Jazmin So RN  Safety Promotion/Fall Prevention:   activity supervised   assistive device/personal items within reach   nonskid shoes/slippers when out of bed   patient and family education   room door open   room near nurse's station   room organization consistent   safety round/check completed   supervised activity   toileting scheduled  Intervention: Prevent Skin Injury  Recent Flowsheet Documentation  Taken 4/17/2025 0900 by Jazmin So RN  Skin Protection:   tubing/devices free from skin contact   incontinence pads utilized   adhesive use limited  Intervention: Prevent and Manage VTE (Venous Thromboembolism) Risk  Recent Flowsheet Documentation  Taken 4/17/2025 0900 by Jazmin So RN  VTE Prevention/Management: ([place]) other (see comments)  Intervention: Prevent Infection  Recent Flowsheet Documentation  Taken 4/17/2025 0900 by Jazmin So RN  Infection Prevention:   hand hygiene promoted   personal protective equipment utilized   rest/sleep promoted   single patient room provided     Problem: Pain Acute  Goal: Optimal Pain Control and Function  Intervention: Optimize Psychosocial Wellbeing  Recent Flowsheet Documentation  Taken 4/17/2025 0900 by Jazmin So RN  Supportive Measures:   active listening utilized   self-care encouraged   verbalization of feelings encouraged  Diversional Activities: television  Intervention: Prevent or Manage Pain  Recent Flowsheet Documentation  Taken 4/17/2025 0900 by Jazmin So RN  Sensory Stimulation Regulation:   quiet environment promoted   care clustered   lighting decreased   auditory stimulation minimized  Bowel Elimination Promotion: adequate fluid intake promoted  Medication Review/Management: medications reviewed     Problem:  Wound  Goal: Optimal Coping  Intervention: Support Patient and Family Response  Recent Flowsheet Documentation  Taken 4/17/2025 0900 by Jazmin So RN  Supportive Measures:   active listening utilized   self-care encouraged   verbalization of feelings encouraged  Family/Support System Care: other (see comments)  Goal: Absence of Infection Signs and Symptoms  Intervention: Prevent or Manage Infection  Recent Flowsheet Documentation  Taken 4/17/2025 0900 by Jazmin So RN  Infection Management: aseptic technique maintained  Isolation Precautions: (none) other (comment)  Goal: Improved Oral Intake  Intervention: Promote and Optimize Oral Intake  Recent Flowsheet Documentation  Taken 4/17/2025 0900 by Jazmin So RN  Oral Nutrition Promotion: rest periods promoted  Nutrition Support Management: weight trending reviewed  Goal: Skin Health and Integrity  Intervention: Optimize Skin Protection  Recent Flowsheet Documentation  Taken 4/17/2025 0900 by Jazmin So RN  Pressure Reduction Devices: (pillow underneath heels) heel offloading device utilized     Problem: Infection  Goal: Absence of Infection Signs and Symptoms  Intervention: Prevent or Manage Infection  Recent Flowsheet Documentation  Taken 4/17/2025 0900 by Jazmin So RN  Infection Management: aseptic technique maintained  Isolation Precautions: (none) other (comment)   Goal Outcome Evaluation:         Vital signs stable. Temp: 98.1  F (36.7  C) Temp src: Oral BP: 137/71 Pulse: 100   Resp: 20 SpO2: 93 % O2 Device: None (Room air) Oxygen Delivery: 1 LPM. Blood glucose 173, 188 and ..Patient has had a noguera order or noguera documentation for 3 days or more. Noguera catheter  is patent draining clear yellow urine. Patient incontinent of small stool and had a medium BM on the bedside commode. Wound cares moscoso to right labia and sacrum. Good food and fluid intake, denied having any pain and discomfort.    Jazmin So RN

## 2025-04-17 NOTE — PLAN OF CARE
Problem: Adult Inpatient Plan of Care  Goal: Plan of Care Review  Description: The Plan of Care Review/Shift note should be completed every shift.  The Outcome Evaluation is a brief statement about your assessment that the patient is improving, declining, or no change.  This information will be displayed automatically on your shiftnote.  Outcome: Progressing   Goal Outcome Evaluation:        Pt was wearing 1 liter oxygen during waking hrs. Went on the Bi-PAP @ night @ 24%. Pt tolerates wearing Bi-PAP mask well & sleeps good t/o the night. Awakens once to ask for a blanket d/t being cold, fan turned off in room. Landrum patent put out clear yellow urine. Today is the last day of IV abx for pt. Takes all meds whole with water w/o any problems, denies pain. Continues to have a flat affect, laughs appropriately @ the [employer] program on television. Has small amts of sanguineous drainage from wound vac in canister from (R) groin wound.

## 2025-04-17 NOTE — PLAN OF CARE
Goal Outcome Evaluation:     Pt was on BiPap ST 18/10 RR 16 26% to 24% at noc for 8hr, jose well, placed on RA at 0744 a.m.     RR 18-24. HR , BS dim t/o before nebs, clear to dim after.Sats 92-97% on RA, got the order to kep sats >88%      Albuterol TID, Nebusal 3% BID, given via m-piece when pt awake and face mask when sleepy, jose well    Cont to monitor and treat

## 2025-04-18 ENCOUNTER — APPOINTMENT (OUTPATIENT)
Dept: PHYSICAL THERAPY | Facility: CLINIC | Age: 60
End: 2025-04-18
Attending: INTERNAL MEDICINE
Payer: COMMERCIAL

## 2025-04-18 ENCOUNTER — APPOINTMENT (OUTPATIENT)
Dept: OCCUPATIONAL THERAPY | Facility: CLINIC | Age: 60
DRG: 602 | End: 2025-04-18
Attending: INTERNAL MEDICINE
Payer: COMMERCIAL

## 2025-04-18 VITALS
BODY MASS INDEX: 31.38 KG/M2 | HEART RATE: 87 BPM | RESPIRATION RATE: 19 BRPM | WEIGHT: 188.6 LBS | SYSTOLIC BLOOD PRESSURE: 131 MMHG | TEMPERATURE: 98.7 F | OXYGEN SATURATION: 96 % | DIASTOLIC BLOOD PRESSURE: 66 MMHG

## 2025-04-18 LAB
ANION GAP SERPL CALCULATED.3IONS-SCNC: 9 MMOL/L (ref 7–15)
BUN SERPL-MCNC: 40.7 MG/DL (ref 8–23)
CALCIUM SERPL-MCNC: 9.8 MG/DL (ref 8.8–10.4)
CHLORIDE SERPL-SCNC: 102 MMOL/L (ref 98–107)
CREAT SERPL-MCNC: 1.25 MG/DL (ref 0.51–0.95)
EGFRCR SERPLBLD CKD-EPI 2021: 49 ML/MIN/1.73M2
ERYTHROCYTE [DISTWIDTH] IN BLOOD BY AUTOMATED COUNT: 14.2 % (ref 10–15)
GLUCOSE BLDC GLUCOMTR-MCNC: 140 MG/DL (ref 70–99)
GLUCOSE BLDC GLUCOMTR-MCNC: 149 MG/DL (ref 70–99)
GLUCOSE BLDC GLUCOMTR-MCNC: 169 MG/DL (ref 70–99)
GLUCOSE BLDC GLUCOMTR-MCNC: 213 MG/DL (ref 70–99)
GLUCOSE SERPL-MCNC: 178 MG/DL (ref 70–99)
HCO3 SERPL-SCNC: 28 MMOL/L (ref 22–29)
HCT VFR BLD AUTO: 34.3 % (ref 35–47)
HGB BLD-MCNC: 10.8 G/DL (ref 11.7–15.7)
MAGNESIUM SERPL-MCNC: 1.7 MG/DL (ref 1.7–2.3)
MCH RBC QN AUTO: 30.3 PG (ref 26.5–33)
MCHC RBC AUTO-ENTMCNC: 31.5 G/DL (ref 31.5–36.5)
MCV RBC AUTO: 96 FL (ref 78–100)
PLATELET # BLD AUTO: 723 10E3/UL (ref 150–450)
POTASSIUM SERPL-SCNC: 4.9 MMOL/L (ref 3.4–5.3)
RBC # BLD AUTO: 3.57 10E6/UL (ref 3.8–5.2)
SODIUM SERPL-SCNC: 139 MMOL/L (ref 135–145)
WBC # BLD AUTO: 7 10E3/UL (ref 4–11)

## 2025-04-18 PROCEDURE — 99232 SBSQ HOSP IP/OBS MODERATE 35: CPT | Performed by: INTERNAL MEDICINE

## 2025-04-18 PROCEDURE — 97130 THER IVNTJ EA ADDL 15 MIN: CPT | Mod: GO | Performed by: OCCUPATIONAL THERAPIST

## 2025-04-18 PROCEDURE — 97116 GAIT TRAINING THERAPY: CPT | Mod: GP | Performed by: PHYSICAL THERAPIST

## 2025-04-18 PROCEDURE — 97530 THERAPEUTIC ACTIVITIES: CPT | Mod: GP | Performed by: PHYSICAL THERAPIST

## 2025-04-18 PROCEDURE — 999N000157 HC STATISTIC RCP TIME EA 10 MIN

## 2025-04-18 PROCEDURE — 36415 COLL VENOUS BLD VENIPUNCTURE: CPT | Performed by: STUDENT IN AN ORGANIZED HEALTH CARE EDUCATION/TRAINING PROGRAM

## 2025-04-18 PROCEDURE — 94640 AIRWAY INHALATION TREATMENT: CPT | Mod: 76

## 2025-04-18 PROCEDURE — 250N000009 HC RX 250: Performed by: STUDENT IN AN ORGANIZED HEALTH CARE EDUCATION/TRAINING PROGRAM

## 2025-04-18 PROCEDURE — 94660 CPAP INITIATION&MGMT: CPT

## 2025-04-18 PROCEDURE — 97535 SELF CARE MNGMENT TRAINING: CPT | Mod: GO | Performed by: OCCUPATIONAL THERAPIST

## 2025-04-18 PROCEDURE — 97129 THER IVNTJ 1ST 15 MIN: CPT | Mod: GO | Performed by: OCCUPATIONAL THERAPIST

## 2025-04-18 PROCEDURE — 250N000013 HC RX MED GY IP 250 OP 250 PS 637: Performed by: STUDENT IN AN ORGANIZED HEALTH CARE EDUCATION/TRAINING PROGRAM

## 2025-04-18 PROCEDURE — 83735 ASSAY OF MAGNESIUM: CPT | Performed by: STUDENT IN AN ORGANIZED HEALTH CARE EDUCATION/TRAINING PROGRAM

## 2025-04-18 PROCEDURE — 120N000017 HC R&B RESPIRATORY CARE

## 2025-04-18 PROCEDURE — 94640 AIRWAY INHALATION TREATMENT: CPT

## 2025-04-18 PROCEDURE — 82374 ASSAY BLOOD CARBON DIOXIDE: CPT | Performed by: STUDENT IN AN ORGANIZED HEALTH CARE EDUCATION/TRAINING PROGRAM

## 2025-04-18 RX ADMIN — OXYCODONE HYDROCHLORIDE 5 MG: 5 TABLET ORAL at 12:11

## 2025-04-18 RX ADMIN — ATORVASTATIN CALCIUM 40 MG: 40 TABLET, FILM COATED ORAL at 21:13

## 2025-04-18 RX ADMIN — ANORECTAL OINTMENT: 15.7; .44; 24; 20.6 OINTMENT TOPICAL at 14:10

## 2025-04-18 RX ADMIN — INSULIN ASPART 1 UNITS: 100 INJECTION, SOLUTION INTRAVENOUS; SUBCUTANEOUS at 17:18

## 2025-04-18 RX ADMIN — INSULIN GLARGINE 30 UNITS: 100 INJECTION, SOLUTION SUBCUTANEOUS at 21:12

## 2025-04-18 RX ADMIN — UMECLIDINIUM BROMIDE AND VILANTEROL TRIFENATATE 1 PUFF: 62.5; 25 POWDER RESPIRATORY (INHALATION) at 09:37

## 2025-04-18 RX ADMIN — ACETAMINOPHEN 975 MG: 325 TABLET, FILM COATED ORAL at 21:16

## 2025-04-18 RX ADMIN — APIXABAN 5 MG: 5 TABLET, FILM COATED ORAL at 21:16

## 2025-04-18 RX ADMIN — ALBUTEROL SULFATE 2.5 MG: 2.5 SOLUTION RESPIRATORY (INHALATION) at 13:27

## 2025-04-18 RX ADMIN — INSULIN ASPART 20 UNITS: 100 INJECTION, SOLUTION INTRAVENOUS; SUBCUTANEOUS at 14:15

## 2025-04-18 RX ADMIN — ACETAMINOPHEN 975 MG: 325 TABLET, FILM COATED ORAL at 06:34

## 2025-04-18 RX ADMIN — INSULIN ASPART 2 UNITS: 100 INJECTION, SOLUTION INTRAVENOUS; SUBCUTANEOUS at 09:30

## 2025-04-18 RX ADMIN — ACETAMINOPHEN 975 MG: 325 TABLET, FILM COATED ORAL at 14:09

## 2025-04-18 RX ADMIN — ALBUTEROL SULFATE 2.5 MG: 2.5 SOLUTION RESPIRATORY (INHALATION) at 07:17

## 2025-04-18 RX ADMIN — APIXABAN 5 MG: 5 TABLET, FILM COATED ORAL at 09:31

## 2025-04-18 RX ADMIN — INSULIN ASPART 1 UNITS: 100 INJECTION, SOLUTION INTRAVENOUS; SUBCUTANEOUS at 14:10

## 2025-04-18 RX ADMIN — INSULIN ASPART 11 UNITS: 100 INJECTION, SOLUTION INTRAVENOUS; SUBCUTANEOUS at 17:20

## 2025-04-18 RX ADMIN — INSULIN ASPART 12 UNITS: 100 INJECTION, SOLUTION INTRAVENOUS; SUBCUTANEOUS at 09:37

## 2025-04-18 RX ADMIN — SODIUM CHLORIDE SOLN NEBU 3% 3 ML: 3 NEBU SOLN at 07:18

## 2025-04-18 RX ADMIN — ANORECTAL OINTMENT: 15.7; .44; 24; 20.6 OINTMENT TOPICAL at 21:12

## 2025-04-18 RX ADMIN — AMLODIPINE BESYLATE 5 MG: 5 TABLET ORAL at 09:31

## 2025-04-18 RX ADMIN — ALBUTEROL SULFATE 2.5 MG: 2.5 SOLUTION RESPIRATORY (INHALATION) at 19:37

## 2025-04-18 RX ADMIN — SODIUM CHLORIDE SOLN NEBU 3% 3 ML: 3 NEBU SOLN at 19:37

## 2025-04-18 ASSESSMENT — ACTIVITIES OF DAILY LIVING (ADL)
ADLS_ACUITY_SCORE: 66
ADLS_ACUITY_SCORE: 62
ADLS_ACUITY_SCORE: 66
ADLS_ACUITY_SCORE: 64
ADLS_ACUITY_SCORE: 62
ADLS_ACUITY_SCORE: 64
ADLS_ACUITY_SCORE: 66
ADLS_ACUITY_SCORE: 66
ADLS_ACUITY_SCORE: 64
ADLS_ACUITY_SCORE: 62
ADLS_ACUITY_SCORE: 64
ADLS_ACUITY_SCORE: 66
ADLS_ACUITY_SCORE: 64

## 2025-04-18 ASSESSMENT — VISUAL ACUITY
OU: NOT TESTED
OU: NOT TESTED

## 2025-04-18 NOTE — PROGRESS NOTES
04/18/25 1135   Appointment Info   Signing Clinician's Name / Credentials (OT) Jose Antonio Martines, OTR/L   Cognitive Screens/Assessments   Cognitive Assessments Completed ACE III   [place] s Cognitive Examination (ACE-III) Total Score (out of 100)  57/100   ACE III Norms A score of 82 or less indicates suspected cognitive impairment   ACE III Domains Assessed Cognitive Screen for Attention, Memory, Fluency, Language, Visuospatial Abilities   ACE III Interpretation Subscores; attention 8/18, memory 12/26, fluency 5/14, Language 21/26, visuospatial 11/16   Interventions   Interventions Quick Adds Cognitive Retraining   Self-Care/Home Management   Self-Care/Home Mgmt/ADL, Compensatory, Meal Prep Minutes (70911) 8   Symptoms Noted During/After Treatment (Meal Preparation/Planning Training) fatigue   Treatment Detail/Skilled Intervention Facilitated SPT to progress safe trf. TH provided cues for tech and hand placement for easeof STS. FWW utilized for SPT. Demo w/ min A, less guidance of walker this date than previous session. Ambulated short distance in room w/ min A. Returned to recliner at end of session, TH instructed pt through scooting LB back into chair. Required min A to scoot.   Cognitive Retraining   Cognitive Skills Intervention 1st 15 Minutes Timed (77240) 15   Cognitive Skills Intervention Addl Minutes (20688) 9   Treatment Detail/Skilled Intervention Pt seen in therapy dept for part of session. Completed ACE III- see doc above. Pt cooperative and partic well. Reviewed memormy comp tech used at [facility](list/calendar) and provided other suggestions.   OT Discharge Planning   OT Plan 4/18: ADLs EOB and progress to [facility] at [facility], ACE III, UB ex, bed mobility   OT Discharge Recommendation (DC Rec) Transitional Care Facility   OT Rationale for DC Rec pt may require further therapy prior to dc to home.   OT Brief overview of current status Pt demo improved SPT this date and good effort/partic. Bright affect and  generally oriented. Cog scores indicate below norms. Cont OT/POC

## 2025-04-18 NOTE — PROVIDER NOTIFICATION
04/16/25 2322   Mode: CPAP/ BiPAP/ AVAPS/ AVAPS AE   CPAP/BiPAP/ AVAPS/ AVAPS AE Mode BiPAP S/T   Equipment   Device V60   CPAP/BiPAP/Settings   BIPAP/CPAP On Standby On   IPAP/EPAP (cmH2O) 18/10   Rate (breaths/min) 16   Oxygen (%) 26   Timed Inspiration (sec) 0.9   IPAP RISE  Settings (V60) 2   CPAP/BiPAP Patient Parameters   RR Total (breaths/min) 19 breaths/min   Vt (mL) 505-550 mL   Minute Ventilation (L/min) 8- 9,6L/min   Peak Inspiratory Pressure (cm H2O) 19 cmH2O   CPAP/BiPAP/AVAPS/AVAPS AE Alarms   High Pressure (cm H2O) 25 cmH2O   Low Pressure (cm H2O) 2   Low Pressure Delay (sec) 50 sec   Apnea (sec) 20   Lo Min Vent 3   High Rate (breaths/min) 45 breaths/min   Low Rate (breaths/min) 16   Audible Alarm set at (Volume of Alarm) 7   Humidifier Checked N/A   RT Device Skin Assessment   Oxygen Delivery Device CPAP/BiPAP Mask   Interface Face Mask - Small   Strap Tightness Finger Allowance between head and device strap   Device Skin Interventions Taken Skin barrier applied;Strap adjusted to allow 1 finger between skin and device strap   Respiratory WDL   Respiratory WDL WDL   Rhythm/Pattern, Respiratory depth regular;pattern regular;no shortness of breath reported   Expansion/Accessory Muscles/Retractions no use of accessory muscles   Ambu at Bedside present     Placed patient on BIPAP at 2345, tolerating well, skin checked no skin breakdown ,   Cont current plan

## 2025-04-18 NOTE — PROGRESS NOTES
CLINICAL NUTRITION SERVICES - REASSESSMENT NOTE     RECOMMENDATIONS FOR MDs/PROVIDERS TO ORDER:    Registered Dietitian Interventions:  Continue ONS regimen     Future/Additional Recommendations:  Monitor po, weight, labs, I/Os, healing.      INFORMATION OBTAINED  Assessed patient in room.  Pt reports good po intakes and appetite, consuming nutrition supplements. Pt denies any nausea vomiting or abd pain. Pt agreeable to continue current nutrition supplements. No nutrition-related questions/concerns at this time.     CURRENT NUTRITION ORDERS  Diet: Orders Placed This Encounter      Combination Diet Moderate Consistent Carb (60 g CHO per Meal) Diet; Easy to Chew (level 7); Thin Liquids (level 0)  Snacks/Supplements: Gelatein 20 daily, Magic Cup daily, and Expedite cup      CURRENT INTAKE/TOLERANCE  4/13-17: po % of meals TID   +100% nutrition supplements   Pt consuming average ~1816 kcal and ~106 g protein, meets 100% estimated nutrition needs     NEW FINDINGS  Lantus increased 4/14 from 25 to 30 units     GI symptoms:    Rounded abd   BM: x3 4/17, x1 this AM (soft/loose)   Skin/wounds: R-groin wound (surgical wound) w/ wound vac, bilateral buttocks PI (stage 2) .S/p irrigation and debridement wound, R-groin and labia on 4/8/2025   2+ edema dependent, BLE  Nutrition-relevant labs: BUN 40.7(H), Creat 1.25(H), -188 last 24 hrs   Nutrition-relevant medications: Scheduled lipitor, novolog, lantus 30 U  Weight: CBW without significant changes from admit,   Wt is down from baseline r/t changes in fluid status  Date/Time Weight Weight Method   04/17/25 0330 85.5 kg (188 lb 9.6 oz) Bed scale   04/15/25 0620 86.2 kg (190 lb) Bed scale   04/13/25 0610 83.1 kg (183 lb 1.6 oz) --   04/12/25 0609 85.7 kg (189 lb) Bed scale     ASSESSED NUTRITION NEEDS  Dosing Weight: 85.5 kg, based on actual wt  Estimated Energy Needs: 2085-0151 kcals/day (20 - 25 kcals/kg)  Justification: Obese  Estimated Protein Needs: 102-128 grams  protein/day (1.2 - 1.5 grams of pro/kg)  Justification: CKD and Wound healing  Estimated Fluid Needs: 4319-8680 mL/day (1 mL/kcal)  Justification: Maintenance    MALNUTRITION  % Intake: No decreased intake noted  % Weight Loss: Weight loss does not meet criteria   Subcutaneous Fat Loss: None observed  Muscle Loss: None observed  Fluid Accumulation/Edema: Moderate to severe, 2-4+  Malnutrition Diagnosis: Patient does not meet two of the established criteria necessary for diagnosing malnutrition  Malnutrition Present on Admission: No    EVALUATION OF THE PROGRESS TOWARD GOALS     NUTRITION DIAGNOSIS  Increased nutrient needs (protein) related to wound healing as evidenced by R-groin surgical wound, stg 2 PI bilateral buttocks.   Evaluation: Progressing     INTERVENTIONS  Medical food supplement therapy    GOALS  - Blood glucose 140-180 mg/dL - Not Met   - Patient to consume % of nutritionally adequate meal trays TID, or the equivalent with supplements/snacks. - Met   - Wound healing per WOC documentation - Improving per WOC 4/15     MONITORING/EVALUATION  Progress toward goals will be monitored and evaluated per policy.

## 2025-04-18 NOTE — PLAN OF CARE
Problem: Adult Inpatient Plan of Care  Goal: Optimal Comfort and Wellbeing  Outcome: Progressing     Problem: Wound  Goal: Skin Health and Integrity  Outcome: Progressing  Intervention: Optimize Skin Protection  Recent Flowsheet Documentation  Taken 4/18/2025 0100 by Wayne Davis RN  Pressure Reduction Devices: (pillow underneath heels) heel offloading device utilized  Goal: Optimal Wound Healing  Outcome: Progressing     Problem: Infection  Goal: Absence of Infection Signs and Symptoms  Outcome: Progressing  Intervention: Prevent or Manage Infection  Recent Flowsheet Documentation  Taken 4/18/2025 0100 by Wayne Davis RN  Isolation Precautions: (none) other (comment)   Goal Outcome Evaluation:         Pt had an uneventful night, wound vac intact, denies pain or discomfort, calm and cooperative with cares, pt slept most of the shift with Bi Pap on, noguera cath patent and draining well, continue monitoring. /66 (BP Location: Left arm)   Pulse 89   Temp 98.7  F (37.1  C) (Oral)   Resp 19   Wt 85.5 kg (188 lb 9.6 oz)   SpO2 97%   BMI 31.38 kg/m

## 2025-04-18 NOTE — PLAN OF CARE
Goal Outcome Evaluation:      Problem: Adult Inpatient Plan of Care  Goal: Optimal Comfort and Wellbeing  Outcome: Progressing    Patient is alert and oriented. V/s stable. Patient requested for PRN oxycodone 5 mg po for pain rated at 7/10 for pre vac dressing change to her right groin. Pain reassessment after an hour was 2/10. Patient appetite is good with adequate fluids intake. Patient participated in PT therapy session walking with assistance in the hallway. Same tolerated. Patient is resting in bed at reporting time. Will continue to monitor.

## 2025-04-18 NOTE — PROGRESS NOTES
Writer called and spoke with Nelly @ [facility] [phone].  She is patient's [place]  to give update.    and Nelly have spoken with patient's sign other-Tavo.  Tavo trying to get FPOA in order to pay rent. Chnaell and  have explained that hospital can not assist with FPOA.  Nelly confirms she has spoken to Tavo numerous times regarding this issues.   Patient open to [facility] at home.     Arik Jane, [facility]  [phone]

## 2025-04-18 NOTE — PROGRESS NOTES
[facility]    Medicine Progress Note - Hospitalist Service    Date of Admission:  4/11/2025    Assessment & Plan   Acute Care Hospital Course  PMHx: Marly Cannon is a 59 year old woman with history of intellectual disability & memory issues, COPD on supplemental oxygen, untreated ADEEL, tobacco use d/o, HTN, pAFib, & poorly controlled T2DM s/p previous hospitalization for DKA (3/18 - 3/20).  CC: Acute onset dyspnea  HPI: Presented again to hospital in [place] on 3/22/2025 with complaints of dyspnea, hyperglycemia, and chest pain. She was found to have an YADIRA, LLL PE, DKA, and A-fib with RVR. She was started on an insulin drip, therapeutic dose enoxaparin, Zosyn for possible pneumonia, and diltiazem drip for management of her RVR. Her DKA resolved, she was switched from diltiazem to amiodarone drip for management of A-fib with RVR; however, her degree of respiratory failure was still concerning and she was intubated 3/23 for management of worsening hypoxia. She had a repeat CTA that demonstrated progression of her PE and RLL collapse concerning for mucous plugging and pneumonia.   She was switched from enoxaparin to heparin drip and her antibiotics were broadened to vancomycin and Zosyn. She was on norepinephrine for several days d/t hypotension attributed to sedation & sepsis-related vasoplegia. It appears that she has been doing fairly long SBTs since 3/26, which seemed to be going reasonably well per RT documentation. Primary team has been having issues w/ progressively increasing FiO2 needs. She had a sputum culture grow Candida & has been on fluconazole since 3/25. She has remained on the insulin drip, presumably to manage the persistent hyperglycemia during her steroid burst (for the presumed COPD exacerbation). She has received a fairly large volume of documented fluids, & was started on diuresis 3/30. She was transferred to [facility] d/t concerns that she will require a tracheostomy secondary to her  inability to liberate from the vent.     LTACH Course  She transferred from [facility] to [place] for wound cares, therapies, and complex medical care.   4/12-4/14: Needing BiPAP overnight.  4/17: successfully weaned off NC O2 during the day    DVT Prophylaxis: Apixaban 5 mg po bid    Barriers to Discharge:  Large right groin wound requiring frequent cares, wound vac    Active LTACH PROBLEMS  Acute on Chronic Hypoxic Respiratory Failure  VAP, PTA  COPD  ADEEL  - Continue zosyn through 4/17  - inhalers as needed  - weaned off HFNC, now NC and Bipap at night. She is normally at 2 LPM supplemental oxygen at home.   - will attempt wean off O2 during the day, keep sats >= 88%  - RT following     Perineal Abscess  Condyloma Acuminata  s/p acyclovir and I&D x3  - zosyn through 4/17  - rectal tube in place for wound protection - will need holiday to avoid rectal ulcer  - noguera for wound  - VAC in place  - Continue wound care per C nurse    PE, Acute, segmental, subsegmental  - On apixaban     T2DM  Poorly controlled with A1c 12.7. Used to be on insulin pump. No longer on it due to intellectual delay.  - BG labile earlier during admission, now stabilizing   - Continue Lantus 30 u daily  - I:C of 1:5 with meals  - ISS moderate    CKD3b  - Creatinine stable. Monitor    History of intellectual delay: supportive care           Diet: Snacks/Supplements Adult: Expedite Cup; With Meals  Snacks/Supplements Adult: Magic Cup; With Meals  Snacks/Supplements Adult: Gelatein 20 (sugar-free); With Meals  Combination Diet Moderate Consistent Carb (60 g CHO per Meal) Diet; Easy to Chew (level 7); Thin Liquids (level 0)    Noguera Catheter: PRESENT, indication: Wound deterioration and failed external collection device  Lines: None     Cardiac Monitoring: None  Code Status: Full Code      Clinically Significant Risk Factors               # Hypoalbuminemia: Lowest albumin = 2.9 g/dL at 4/13/2025  2:14 AM, will monitor as appropriate     #  "Hypertension: Noted on problem list           # DMII: A1C = 12.7 % (Ref range: <5.7 %) within past 6 months   # Obesity: Estimated body mass index is 31.38 kg/m  as calculated from the following:    Height as of 3/22/25: 1.651 m (5' 5\").    Weight as of this encounter: 85.5 kg (188 lb 9.6 oz).      # COPD: noted on problem list        Social Drivers of Health    Tobacco Use: Medium Risk (4/2/2025)    Patient History     Smoking Tobacco Use: Former     Smokeless Tobacco Use: Never     Passive Exposure: Current   Physical Activity: Unknown (5/16/2024)    Exercise Vital Sign     Days of Exercise per Week: 0 days   Social Connections: Unknown (5/16/2024)    Social Connection and Isolation Panel [NHANES]     Frequency of Social Gatherings with Friends and Family: More than three times a week          Disposition Plan     Medically Ready for Discharge: Anticipated in 5+ Days             Andrez Mueller MD  Hospitalist Service  LTACH  Securely message with [employer] (more info)  Text page via [employer] Paging/Directory   ______________________________________________________________________    Interval History   Patient feels well, slept well, good appetite. Standing at side of bed.    Physical Exam   Vital Signs: Temp: 98.7  F (37.1  C) Temp src: Oral BP: 131/66 Pulse: 85   Resp: 18 SpO2: (!) 90 % O2 Device: None (Room air)    Weight: 188 lbs 9.6 oz    Vitals and nursing note reviewed.   Constitutional:  Well developed, obese adult female, in no acute distress   HEENT:      Eyes: Vision grossly intact, Conjunctivae clear without bleeding and without jaundice.      Head: Normocephalic and atraumatic.      Nares: without obstruction, bleeding or discharge.     Mouth/throat: gums without bleeding. No ulcers, no swelling  Skin: Skin is warm and well perfused.   Pulmonary: Lungs clear to auscultation bilaterally  Cardiac: RRR, no clinically significant murmur, gallop, rub  GI: Soft, NT, BS+  Neurological: Face is symmetric. Speech " fluent, clear, appropriate. Oriented to self, time and place. Moves all extremities equally, 5/5 strength bilaterally.     Medical Decision Making       25 MINUTES SPENT BY ME on the date of service doing chart review, history, exam, documentation & further activities per the note.      Data   ------------------------- PAST 24 HR DATA REVIEWED -----------------------------------------------    I have personally reviewed the following data over the past 24 hrs:    7.0  \   10.8 (L)   / 723 (H)     139 102 40.7 (H) /  140 (H)   4.9 28 1.25 (H) \       Imaging results reviewed over the past 24 hrs:   No results found for this or any previous visit (from the past 24 hours).

## 2025-04-18 NOTE — PROVIDER NOTIFICATION
04/18/25 0718 04/18/25 0753 04/18/25 1328   Tech Time   $Tech Time (10 minute increments) 2  --   --    Vital Signs   Temp  --  98.3  F (36.8  C)  --    Temp src  --  Oral  --    Resp 18 20 20   Pulse 85 86 91   Pulse Rate Source Monitor Monitor Monitor   BP  --  138/68  --    BP Location  --  Left arm  --    Oxygen Therapy   Daily Review of Necessity (O2 Therapy) completed  --   --    Oxygen Concentration (%) 21  --   --    Device (Oxygen Therapy) none (room air)  --   --    Oxygen Therapy   SpO2 (!) 90 % 92 % 95 %   O2 Device None (Room air) None (Room air) None (Room air)   Oxygen Delivery  --   --  1 LPM   Assessment   Respiratory WDL breath sounds  --  breath sounds   Rhythm/Pattern, Respiratory depth regular;pattern regular  --  depth regular;pattern regular   Expansion/Accessory Muscles/Retractions expansion symmetric  --  expansion symmetric   Airway Safety Measures all equipment/monitors on and audible  --  all equipment/monitors on and audible   Breath Sounds   Breath Sounds All Fields  --   --    All Lung Fields Breath Sounds diminished  --   --    JILLIAN Breath Sounds diminished  --   --    Nebulizer Assessment & Treatment   $RT Use ONLY Delivery Method Nebulizer - Initial  --  Nebulizer - Additional   Daily Review of Necessity (SVN) completed  --  completed   Nebulizer Device Face mask  --  Mouthpiece   Pretreatment Heart Rate (beats/min) 85  --  91   Pretreatment Resp Rate (breaths/min) 20  --  20   Pretreatment O2 sats - (TCU only) 90  --  96   Pretreat Breath Sounds - Bilat - All Lobes diminished  --  diminished   Pretreat Breath Sounds - JILLIAN diminished  --  diminished   Breath Sounds Post-Respiratory Treatment   Posttreatment Heart Rate (beats/min) 86  --  96   Posttreatment Resp Rate (breaths/min) 18  --  16   Post treatment O2 Sats - (TCU only) 92  --  91   Posttreatment Assessment (SVN) breath sounds unchanged  --  increased aeration   Signs of Intolerance (SVN) none  --  none   Breath Sounds  Posttreatment All Fields All Fields  --  All Fields   Breath Sounds Posttreatment All Fields diminished  --  aeration increased   Pt goes back on the BiPAP @ NOC,  RA day. RT will cont to monitor and tx as needed.

## 2025-04-19 ENCOUNTER — APPOINTMENT (OUTPATIENT)
Dept: PHYSICAL THERAPY | Facility: CLINIC | Age: 60
DRG: 602 | End: 2025-04-19
Attending: INTERNAL MEDICINE
Payer: COMMERCIAL

## 2025-04-19 LAB
GLUCOSE BLDC GLUCOMTR-MCNC: 114 MG/DL (ref 70–99)
GLUCOSE BLDC GLUCOMTR-MCNC: 169 MG/DL (ref 70–99)
GLUCOSE BLDC GLUCOMTR-MCNC: 179 MG/DL (ref 70–99)
GLUCOSE BLDC GLUCOMTR-MCNC: 185 MG/DL (ref 70–99)

## 2025-04-19 PROCEDURE — 250N000013 HC RX MED GY IP 250 OP 250 PS 637: Performed by: STUDENT IN AN ORGANIZED HEALTH CARE EDUCATION/TRAINING PROGRAM

## 2025-04-19 PROCEDURE — 94640 AIRWAY INHALATION TREATMENT: CPT | Mod: 76

## 2025-04-19 PROCEDURE — 120N000017 HC R&B RESPIRATORY CARE

## 2025-04-19 PROCEDURE — 97110 THERAPEUTIC EXERCISES: CPT | Mod: GP | Performed by: PHYSICAL THERAPIST

## 2025-04-19 PROCEDURE — 97530 THERAPEUTIC ACTIVITIES: CPT | Mod: GP | Performed by: PHYSICAL THERAPIST

## 2025-04-19 PROCEDURE — 94660 CPAP INITIATION&MGMT: CPT

## 2025-04-19 PROCEDURE — 94640 AIRWAY INHALATION TREATMENT: CPT

## 2025-04-19 PROCEDURE — 99232 SBSQ HOSP IP/OBS MODERATE 35: CPT | Performed by: INTERNAL MEDICINE

## 2025-04-19 PROCEDURE — 999N000157 HC STATISTIC RCP TIME EA 10 MIN

## 2025-04-19 PROCEDURE — 250N000009 HC RX 250: Performed by: STUDENT IN AN ORGANIZED HEALTH CARE EDUCATION/TRAINING PROGRAM

## 2025-04-19 RX ORDER — ACETAMINOPHEN 325 MG/1
650 TABLET ORAL EVERY 6 HOURS PRN
Status: DISCONTINUED | OUTPATIENT
Start: 2025-04-19 | End: 2025-05-09 | Stop reason: HOSPADM

## 2025-04-19 RX ADMIN — INSULIN ASPART 12 UNITS: 100 INJECTION, SOLUTION INTRAVENOUS; SUBCUTANEOUS at 18:09

## 2025-04-19 RX ADMIN — ALBUTEROL SULFATE 2.5 MG: 2.5 SOLUTION RESPIRATORY (INHALATION) at 07:11

## 2025-04-19 RX ADMIN — INSULIN ASPART 2 UNITS: 100 INJECTION, SOLUTION INTRAVENOUS; SUBCUTANEOUS at 09:02

## 2025-04-19 RX ADMIN — ATORVASTATIN CALCIUM 40 MG: 40 TABLET, FILM COATED ORAL at 20:28

## 2025-04-19 RX ADMIN — ALBUTEROL SULFATE 2.5 MG: 2.5 SOLUTION RESPIRATORY (INHALATION) at 13:05

## 2025-04-19 RX ADMIN — APIXABAN 5 MG: 5 TABLET, FILM COATED ORAL at 09:02

## 2025-04-19 RX ADMIN — ALBUTEROL SULFATE 2.5 MG: 2.5 SOLUTION RESPIRATORY (INHALATION) at 20:00

## 2025-04-19 RX ADMIN — INSULIN ASPART 19 UNITS: 100 INJECTION, SOLUTION INTRAVENOUS; SUBCUTANEOUS at 13:21

## 2025-04-19 RX ADMIN — ANORECTAL OINTMENT: 15.7; .44; 24; 20.6 OINTMENT TOPICAL at 20:29

## 2025-04-19 RX ADMIN — INSULIN GLARGINE 30 UNITS: 100 INJECTION, SOLUTION SUBCUTANEOUS at 20:28

## 2025-04-19 RX ADMIN — INSULIN ASPART 13 UNITS: 100 INJECTION, SOLUTION INTRAVENOUS; SUBCUTANEOUS at 09:03

## 2025-04-19 RX ADMIN — SODIUM CHLORIDE SOLN NEBU 3% 3 ML: 3 NEBU SOLN at 07:11

## 2025-04-19 RX ADMIN — SODIUM CHLORIDE SOLN NEBU 3% 3 ML: 3 NEBU SOLN at 20:00

## 2025-04-19 RX ADMIN — ANORECTAL OINTMENT: 15.7; .44; 24; 20.6 OINTMENT TOPICAL at 09:02

## 2025-04-19 RX ADMIN — ANORECTAL OINTMENT: 15.7; .44; 24; 20.6 OINTMENT TOPICAL at 13:20

## 2025-04-19 RX ADMIN — ACETAMINOPHEN 975 MG: 325 TABLET, FILM COATED ORAL at 06:40

## 2025-04-19 RX ADMIN — APIXABAN 5 MG: 5 TABLET, FILM COATED ORAL at 20:28

## 2025-04-19 RX ADMIN — INSULIN ASPART 2 UNITS: 100 INJECTION, SOLUTION INTRAVENOUS; SUBCUTANEOUS at 13:20

## 2025-04-19 RX ADMIN — AMLODIPINE BESYLATE 5 MG: 5 TABLET ORAL at 09:02

## 2025-04-19 RX ADMIN — UMECLIDINIUM BROMIDE AND VILANTEROL TRIFENATATE 1 PUFF: 62.5; 25 POWDER RESPIRATORY (INHALATION) at 09:02

## 2025-04-19 ASSESSMENT — ACTIVITIES OF DAILY LIVING (ADL)
ADLS_ACUITY_SCORE: 62
ADLS_ACUITY_SCORE: 63
ADLS_ACUITY_SCORE: 62
ADLS_ACUITY_SCORE: 63
ADLS_ACUITY_SCORE: 63
ADLS_ACUITY_SCORE: 62
ADLS_ACUITY_SCORE: 63
ADLS_ACUITY_SCORE: 62
ADLS_ACUITY_SCORE: 63
ADLS_ACUITY_SCORE: 63
ADLS_ACUITY_SCORE: 62
ADLS_ACUITY_SCORE: 62
ADLS_ACUITY_SCORE: 63
ADLS_ACUITY_SCORE: 62
ADLS_ACUITY_SCORE: 63
ADLS_ACUITY_SCORE: 62
ADLS_ACUITY_SCORE: 63

## 2025-04-19 ASSESSMENT — VISUAL ACUITY: OU: NOT TESTED

## 2025-04-19 NOTE — PLAN OF CARE
"Goal Outcome Evaluation:      Plan of Care Reviewed With: patient    Overall Patient Progress: no change Overall Patient Progress: no change    Outcome Evaluation: Pt VSS. Alert, flat mood. Complains of \"mild\" pain at wound location. On RA, tolerating well. Adequate intake, ate 100% of dinner. Last BM today. Landrum output adequate. Wounds intact, vac at 125mmhg, minimal output. No PRNs given.    /65 (BP Location: Left arm)   Pulse 94   Temp 98.3  F (36.8  C) (Oral)   Resp 20   Wt 85.5 kg (188 lb 9.6 oz)   SpO2 96%   BMI 31.38 kg/m           "

## 2025-04-19 NOTE — PROVIDER NOTIFICATION
04/19/25 0430   CPAP/BiPAP/Settings   BIPAP/CPAP On Standby On   IPAP/EPAP (cmH2O) 18/10   Rate (breaths/min) 16   Oxygen (%) 24   Timed Inspiration (sec) 0.9   IPAP RISE  Settings (V60) 2     Patient using BIPAP overnight.     Kathy Saldivar, RT on 4/19/2025 at 5:30 AM

## 2025-04-19 NOTE — PROVIDER NOTIFICATION
04/19/25 0711 04/19/25 0749 04/19/25 0801   Tech Time   $Tech Time (10 minute increments) 2 2  --    Vital Signs   Temp  --   --  98.2  F (36.8  C)   Temp src  --   --  Oral   Resp 17 18 16   Pulse 84 94 92   Pulse Rate Source Monitor Monitor Monitor   BP  --   --  126/68   BP Location  --   --  Left arm   Oxygen Therapy   Daily Review of Necessity (O2 Therapy) completed  --   --    Oxygen Concentration (%) 24  --   --    Device (Oxygen Therapy) BiPAP  --   --    Oxygen Therapy   SpO2 97 % 96 % (!) 91 %   O2 Device BiPAP/CPAP None (Room air) None (Room air)   FiO2 (%) 24 %  --   --    Humidifier Checked N/A  --   --    Assessment   Respiratory WDL cough;breath sounds  --   --    Rhythm/Pattern, Respiratory assisted mechanically  --   --    Expansion/Accessory Muscles/Retractions expansion symmetric  --   --    Airway Safety Measures all equipment/monitors on and audible  --   --    Breath Sounds   Breath Sounds All Fields All Fields  --    All Lung Fields Breath Sounds clear;diminished clear;diminished  --    Nebulizer Assessment & Treatment   $RT Use ONLY Delivery Method Nebulizer - Initial  --   --    Daily Review of Necessity (SVN) completed  --   --    Nebulizer Device In line  --   --    Pretreatment Heart Rate (beats/min) 84  --   --    Pretreatment Resp Rate (breaths/min) 17  --   --    Pretreatment O2 sats - (TCU only) 97  --   --    Pretreat Breath Sounds - Bilat - All Lobes clear;diminished  --   --       04/19/25 0900 04/19/25 1305 04/19/25 1408   Tech Time   $Tech Time (10 minute increments)  --  2  --    Vital Signs   Temp  --   --   --    Temp src  --   --   --    Resp  --  18  --    Pulse  --  96  --    Pulse Rate Source  --  Monitor  --    BP  --   --   --    BP Location  --   --   --    Oxygen Therapy   Daily Review of Necessity (O2 Therapy)  --   --   --    Oxygen Concentration (%)  --   --   --    Device (Oxygen Therapy)  --   --   --    Oxygen Therapy   SpO2 93 % 98 %  --    O2 Device None  (Room air) None (Room air)  --    FiO2 (%)  --   --   --    Humidifier Checked  --   --   --    Assessment   Respiratory WDL  --  breath sounds rhythm/pattern   Rhythm/Pattern, Respiratory  --  pattern regular pattern regular   Expansion/Accessory Muscles/Retractions  --  expansion symmetric expansion symmetric   Airway Safety Measures  --   --  all equipment/monitors on and audible   Breath Sounds   Breath Sounds  --  All Fields All Fields   All Lung Fields Breath Sounds  --  clear;diminished clear;diminished   Nebulizer Assessment & Treatment   $RT Use ONLY Delivery Method  --  Nebulizer - Additional  --    Daily Review of Necessity (SVN)  --  completed  --    Nebulizer Device  --  Mouthpiece  --    Pretreatment Heart Rate (beats/min)  --  96  --    Pretreatment Resp Rate (breaths/min)  --  18  --    Pretreatment O2 sats - (TCU only)  --  98  --    Pretreat Breath Sounds - Bilat - All Lobes  --  clear;diminished  --    Pt is stable on RA during the day. RT will cont to follow as needed.

## 2025-04-19 NOTE — PLAN OF CARE
Problem: Adult Inpatient Plan of Care  Goal: Plan of Care Review  Description: The Plan of Care Review/Shift note should be completed every shift.  The Outcome Evaluation is a brief statement about your assessment that the patient is improving, declining, or no change.  This information will be displayed automatically on your shiftnote.  Outcome: Progressing  Goal: Absence of Hospital-Acquired Illness or Injury  Intervention: Identify and Manage Fall Risk  Recent Flowsheet Documentation  Taken 4/19/2025 0100 by Shraddha Galeano RN  Safety Promotion/Fall Prevention:   activity supervised   lighting adjusted   room door open   room near nurse's station  Intervention: Prevent Infection  Recent Flowsheet Documentation  Taken 4/19/2025 0100 by Shraddha Galeano RN  Infection Prevention: hand hygiene promoted   Goal Outcome Evaluation:       Patient was Calm and cooperative with cares,denied pain and slept most of the shift, patient was repositioned every 2 hours, /75 (BP Location: Left arm)   Pulse 92   Temp 98.6  F (37  C) (Axillary)   Resp 16   Wt 85.5 kg (188 lb 9.6 oz)   SpO2 95%   BMI 31.38 kg/m

## 2025-04-19 NOTE — PROGRESS NOTES
[facility]    Medicine Progress Note - Hospitalist Service    Date of Admission:  4/11/2025    Assessment & Plan   Acute Care Hospital Course  PMHx: Marly Cannon is a 59 year old woman with history of intellectual disability & memory issues, COPD, untreated ADEEL, tobacco use d/o, HTN, pAFib, & poorly controlled T2DM s/p previous hospitalization for DKA (3/18 - 3/20).  CC: Acute onset dyspnea  HPI: Presented again to hospital in [place] on 3/22/2025 with complaints of dyspnea, hyperglycemia, and chest pain. She was found to have an YADIRA, LLL PE, DKA, and A-fib with RVR. She was started on an insulin drip, therapeutic dose enoxaparin, Zosyn for possible pneumonia, and diltiazem drip for management of her RVR. Her DKA resolved, she was switched from diltiazem to amiodarone drip for management of A-fib with RVR; however, her degree of respiratory failure was still concerning and she was intubated 3/23 for management of worsening hypoxia.   She had a repeat CTA that demonstrated progression of her PE and RLL collapse concerning for mucous plugging and pneumonia.   She was switched from enoxaparin to heparin drip and her antibiotics were broadened to vancomycin and Zosyn. She was on norepinephrine for several days d/t hypotension attributed to sedation & sepsis-related vasoplegia. It appears that she has been doing fairly long SBTs since 3/26, which seemed to be going reasonably well per RT documentation. Primary team has been having issues w/ progressively increasing FiO2 needs. She had a sputum culture grow Candida & has been on fluconazole since 3/25. Transferred to [facility] d/t concerns that she might require a tracheostomy secondary to her inability to liberate from the vent.   At [facility] patient was admitted to the ICU on a ventilator, successfully extubated 4/1 and downgraded to floor status 4/3.  Infectious disease consulted and continued patient on Zosyn course to be completed through  4/17/2025.  General surgery consulted for right keiry/perineal abscess and performed incision and drainage with Penrose drain 3/31 with subsequent repeat I&D 4/2 and 4/8.  Surgery requests wound VAC be changed by wound care Tuesdays and Thursdays.     LTACH Course  She transferred from [facility] to [place] for wound cares, therapies, and complex medical care.   4/12-4/14: Needing BiPAP overnight.  4/17: successfully weaned off NC O2 during the day    DVT Prophylaxis: Apixaban 5 mg po bid    Barriers to Discharge:  Large right groin wound requiring frequent cares, wound vac, IV abx, nocturnal BIPAP    Active LTACH PROBLEMS  Acute on Chronic Hypoxic Respiratory Failure  VAP, PTA  COPD  ADEEL  - Continue zosyn through 4/17  - inhalers as needed  - weaned off HFNC, weaned off NC during the day. Continues Bipap at night.  - RT following     Perineal Abscess  Condyloma Acuminata  s/p acyclovir and I&D x3  - zosyn through 4/17  - rectal tube discontinued  - noguera for wound protection  - VAC in place  - Continue wound care per [facility] nurse    PE, Acute, segmental, subsegmental  - On apixaban     T2DM  Poorly controlled with A1c 12.7. Used to be on insulin pump. No longer on it due to intellectual delay.  - BG labile earlier during admission, now stabilizing   - Continue Lantus 30 u daily  - I:C of 1:5 with meals  - ISS moderate    CKD3b  - Creatinine stable. Monitor    History of intellectual delay: supportive care         Diet: Snacks/Supplements Adult: Expedite Cup; With Meals  Snacks/Supplements Adult: Magic Cup; With Meals  Snacks/Supplements Adult: Gelatein 20 (sugar-free); With Meals  Combination Diet Moderate Consistent Carb (60 g CHO per Meal) Diet; Easy to Chew (level 7); Thin Liquids (level 0)    Noguera Catheter: PRESENT, indication: Wound deterioration and failed external collection device  Lines: None     Cardiac Monitoring: None  Code Status: Full Code      Clinically Significant Risk Factors               #  "Hypoalbuminemia: Lowest albumin = 2.9 g/dL at 4/13/2025  2:14 AM, will monitor as appropriate     # Hypertension: Noted on problem list           # DMII: A1C = 12.7 % (Ref range: <5.7 %) within past 6 months   # Obesity: Estimated body mass index is 31.62 kg/m  as calculated from the following:    Height as of 3/22/25: 1.651 m (5' 5\").    Weight as of this encounter: 86.2 kg (190 lb).      # COPD: noted on problem list        Social Drivers of Health    Tobacco Use: Medium Risk (4/2/2025)    Patient History     Smoking Tobacco Use: Former     Smokeless Tobacco Use: Never     Passive Exposure: Current   Physical Activity: Unknown (5/16/2024)    Exercise Vital Sign     Days of Exercise per Week: 0 days   Social Connections: Unknown (5/16/2024)    Social Connection and Isolation Panel [NHANES]     Frequency of Social Gatherings with Friends and Family: More than three times a week          Disposition Plan     Medically Ready for Discharge: Anticipated in 5+ Days             Andrez Mueller MD  Hospitalist Service  LTACH  Securely message with [employer] (more info)  Text page via [facility] Paging/Directory   ______________________________________________________________________    Interval History   Patient comfortable, slept well, hungry for breakfast. Walked in hallway yesterday. Boyfriend is 20 yrs her antoine, smokes.    Physical Exam   Vital Signs: Temp: 98.6  F (37  C) Temp src: Axillary BP: 117/75 Pulse: 92   Resp: 16 SpO2: 95 % O2 Device: BiPAP/CPAP Oxygen Delivery: 1 LPM  Weight: 190 lbs 0 oz    Vitals and nursing note reviewed.   Constitutional:  Well developed, obese adult female, in no acute distress   HEENT:      Eyes: Vision grossly intact, Conjunctivae clear without bleeding and without jaundice.      Head: Normocephalic and atraumatic.      Nares: without obstruction, bleeding or discharge.  Skin: Skin is warm and well perfused.   Pulmonary: Lungs clear to auscultation bilaterally  Cardiac: RRR, no clinically " significant murmur, gallop, rub  GI: Soft, NT, BS+  Neurological: Face is symmetric. Speech fluent, clear, appropriate. Oriented to self, time and place. Moves all extremities equally, 5/5 strength bilaterally.     Medical Decision Making       25 MINUTES SPENT BY ME on the date of service doing chart review, history, exam, documentation & further activities per the note.      Data   ------------------------- PAST 24 HR DATA REVIEWED -----------------------------------------------        Imaging results reviewed over the past 24 hrs:   No results found for this or any previous visit (from the past 24 hours).

## 2025-04-19 NOTE — PLAN OF CARE
Problem: Pain Acute  Goal: Optimal Pain Control and Function  Intervention: Optimize Psychosocial Wellbeing  Recent Flowsheet Documentation  Taken 4/19/2025 1557 by Linda Mccormack RN  Supportive Measures:   active listening utilized   decision-making supported   self-care encouraged   self-responsibility promoted   verbalization of feelings encouraged  Diversional Activities: television     Problem: Wound  Goal: Optimal Wound Healing  Outcome: Progressing   Goal Outcome Evaluation:  stable, vss, denied pain. Voiding well, clear urine. Cares and treatment done with pt co-operating.

## 2025-04-19 NOTE — PLAN OF CARE
Goal Outcome Evaluation:      Problem: Wound  Goal: Optimal Coping  Outcome: Progressing    Problem: Adult Inpatient Plan of Care  Goal: Optimal Comfort and Wellbeing  Outcome: Progressing    Patient is alert and oriented x 4. V/s stable. Patient denies pain or discomfort. Appetite is good with adequate fluids intake. Patient spent the shift in bed watching TV and receiving phone calls from family. Patient tolerated wound dressing change to right labia. Small sanguinous drainage noted during dressing change. Pressure wound vac dressing to right groin is intact. Patient is resting in bed at reporting time. Will continue to monitor.

## 2025-04-20 LAB
CREAT SERPL-MCNC: 1.17 MG/DL (ref 0.51–0.95)
EGFRCR SERPLBLD CKD-EPI 2021: 53 ML/MIN/1.73M2
GLUCOSE BLDC GLUCOMTR-MCNC: 183 MG/DL (ref 70–99)
GLUCOSE BLDC GLUCOMTR-MCNC: 189 MG/DL (ref 70–99)
GLUCOSE BLDC GLUCOMTR-MCNC: 202 MG/DL (ref 70–99)
GLUCOSE BLDC GLUCOMTR-MCNC: 203 MG/DL (ref 70–99)
GLUCOSE BLDC GLUCOMTR-MCNC: 247 MG/DL (ref 70–99)
PLATELET # BLD AUTO: 715 10E3/UL (ref 150–450)

## 2025-04-20 PROCEDURE — 94640 AIRWAY INHALATION TREATMENT: CPT

## 2025-04-20 PROCEDURE — 82565 ASSAY OF CREATININE: CPT | Performed by: STUDENT IN AN ORGANIZED HEALTH CARE EDUCATION/TRAINING PROGRAM

## 2025-04-20 PROCEDURE — 999N000157 HC STATISTIC RCP TIME EA 10 MIN

## 2025-04-20 PROCEDURE — 94640 AIRWAY INHALATION TREATMENT: CPT | Mod: 76

## 2025-04-20 PROCEDURE — 250N000009 HC RX 250: Performed by: STUDENT IN AN ORGANIZED HEALTH CARE EDUCATION/TRAINING PROGRAM

## 2025-04-20 PROCEDURE — 250N000013 HC RX MED GY IP 250 OP 250 PS 637: Performed by: STUDENT IN AN ORGANIZED HEALTH CARE EDUCATION/TRAINING PROGRAM

## 2025-04-20 PROCEDURE — 999N000123 HC STATISTIC OXYGEN O2DAILY TECH TIME

## 2025-04-20 PROCEDURE — 94660 CPAP INITIATION&MGMT: CPT

## 2025-04-20 PROCEDURE — 250N000012 HC RX MED GY IP 250 OP 636 PS 637: Performed by: STUDENT IN AN ORGANIZED HEALTH CARE EDUCATION/TRAINING PROGRAM

## 2025-04-20 PROCEDURE — 36415 COLL VENOUS BLD VENIPUNCTURE: CPT | Performed by: STUDENT IN AN ORGANIZED HEALTH CARE EDUCATION/TRAINING PROGRAM

## 2025-04-20 PROCEDURE — 99232 SBSQ HOSP IP/OBS MODERATE 35: CPT | Performed by: INTERNAL MEDICINE

## 2025-04-20 PROCEDURE — 120N000017 HC R&B RESPIRATORY CARE

## 2025-04-20 PROCEDURE — 85049 AUTOMATED PLATELET COUNT: CPT | Performed by: STUDENT IN AN ORGANIZED HEALTH CARE EDUCATION/TRAINING PROGRAM

## 2025-04-20 RX ADMIN — ATORVASTATIN CALCIUM 40 MG: 40 TABLET, FILM COATED ORAL at 21:01

## 2025-04-20 RX ADMIN — INSULIN GLARGINE 30 UNITS: 100 INJECTION, SOLUTION SUBCUTANEOUS at 21:05

## 2025-04-20 RX ADMIN — ANORECTAL OINTMENT: 15.7; .44; 24; 20.6 OINTMENT TOPICAL at 14:55

## 2025-04-20 RX ADMIN — INSULIN ASPART 2 UNITS: 100 INJECTION, SOLUTION INTRAVENOUS; SUBCUTANEOUS at 17:41

## 2025-04-20 RX ADMIN — INSULIN ASPART 3 UNITS: 100 INJECTION, SOLUTION INTRAVENOUS; SUBCUTANEOUS at 09:01

## 2025-04-20 RX ADMIN — INSULIN ASPART 7 UNITS: 100 INJECTION, SOLUTION INTRAVENOUS; SUBCUTANEOUS at 17:45

## 2025-04-20 RX ADMIN — APIXABAN 5 MG: 5 TABLET, FILM COATED ORAL at 21:01

## 2025-04-20 RX ADMIN — SODIUM CHLORIDE SOLN NEBU 3% 3 ML: 3 NEBU SOLN at 19:57

## 2025-04-20 RX ADMIN — ALBUTEROL SULFATE 2.5 MG: 2.5 SOLUTION RESPIRATORY (INHALATION) at 19:57

## 2025-04-20 RX ADMIN — INSULIN ASPART 12 UNITS: 100 INJECTION, SOLUTION INTRAVENOUS; SUBCUTANEOUS at 12:36

## 2025-04-20 RX ADMIN — ANORECTAL OINTMENT: 15.7; .44; 24; 20.6 OINTMENT TOPICAL at 09:00

## 2025-04-20 RX ADMIN — APIXABAN 5 MG: 5 TABLET, FILM COATED ORAL at 09:00

## 2025-04-20 RX ADMIN — ANORECTAL OINTMENT: 15.7; .44; 24; 20.6 OINTMENT TOPICAL at 21:02

## 2025-04-20 RX ADMIN — ALBUTEROL SULFATE 2.5 MG: 2.5 SOLUTION RESPIRATORY (INHALATION) at 07:53

## 2025-04-20 RX ADMIN — ALBUTEROL SULFATE 2.5 MG: 2.5 SOLUTION RESPIRATORY (INHALATION) at 13:00

## 2025-04-20 RX ADMIN — AMLODIPINE BESYLATE 5 MG: 5 TABLET ORAL at 09:00

## 2025-04-20 RX ADMIN — UMECLIDINIUM BROMIDE AND VILANTEROL TRIFENATATE 1 PUFF: 62.5; 25 POWDER RESPIRATORY (INHALATION) at 09:00

## 2025-04-20 RX ADMIN — INSULIN ASPART 5 UNITS: 100 INJECTION, SOLUTION INTRAVENOUS; SUBCUTANEOUS at 12:10

## 2025-04-20 RX ADMIN — SODIUM CHLORIDE SOLN NEBU 3% 3 ML: 3 NEBU SOLN at 07:54

## 2025-04-20 RX ADMIN — INSULIN ASPART 6 UNITS: 100 INJECTION, SOLUTION INTRAVENOUS; SUBCUTANEOUS at 09:02

## 2025-04-20 ASSESSMENT — ACTIVITIES OF DAILY LIVING (ADL)
ADLS_ACUITY_SCORE: 62
ADLS_ACUITY_SCORE: 63
ADLS_ACUITY_SCORE: 62
ADLS_ACUITY_SCORE: 62
ADLS_ACUITY_SCORE: 63
ADLS_ACUITY_SCORE: 62
ADLS_ACUITY_SCORE: 62
ADLS_ACUITY_SCORE: 63
ADLS_ACUITY_SCORE: 62
ADLS_ACUITY_SCORE: 62
ADLS_ACUITY_SCORE: 63
ADLS_ACUITY_SCORE: 63
ADLS_ACUITY_SCORE: 62
ADLS_ACUITY_SCORE: 63
ADLS_ACUITY_SCORE: 63
ADLS_ACUITY_SCORE: 62
ADLS_ACUITY_SCORE: 63
ADLS_ACUITY_SCORE: 63
ADLS_ACUITY_SCORE: 61

## 2025-04-20 ASSESSMENT — VISUAL ACUITY: OU: NOT TESTED

## 2025-04-20 NOTE — PROGRESS NOTES
Patient on BIPAP 18/10, 16, 21%. RR 18, HR 93, SpO2 97%.    On RA during day.    Albuterol TID. NaCl 3% BID    BS clear and diminished. Increased aeration post bronchodilator with expiratory wheeze.    RT will continue to follow.

## 2025-04-20 NOTE — PROGRESS NOTES
[facility]    Medicine Progress Note - Hospitalist Service    Date of Admission:  4/11/2025    Assessment & Plan   Acute Care Hospital Course  PMHx: Marly Cannon is a 59 year old woman with history of intellectual disability & memory issues, COPD, untreated ADEEL, tobacco use d/o, HTN, pAFib, & poorly controlled T2DM s/p previous hospitalization for DKA (3/18 - 3/20).  CC: Acute onset dyspnea  HPI: Presented again to hospital in [place] on 3/22/2025 with complaints of dyspnea, hyperglycemia, and chest pain. She was found to have an YADIRA, LLL PE, DKA, and A-fib with RVR. She was started on an insulin drip, therapeutic dose enoxaparin, Zosyn for possible pneumonia, and diltiazem drip for management of her RVR. Her DKA resolved, she was switched from diltiazem to amiodarone drip for management of A-fib with RVR; however, her degree of respiratory failure was still concerning and she was intubated 3/23 for management of worsening hypoxia.   She had a repeat CTA that demonstrated progression of her PE and RLL collapse concerning for mucous plugging and pneumonia.   She was switched from enoxaparin to heparin drip and her antibiotics were broadened to vancomycin and Zosyn. She was on norepinephrine for several days d/t hypotension attributed to sedation & sepsis-related vasoplegia. It appears that she has been doing fairly long SBTs since 3/26, which seemed to be going reasonably well per RT documentation. Primary team has been having issues w/ progressively increasing FiO2 needs. She had a sputum culture grow Candida & has been on fluconazole since 3/25. Transferred to [facility] d/t concerns that she might require a tracheostomy secondary to her inability to liberate from the vent.   At [facility] patient was admitted to the ICU on a ventilator, successfully extubated 4/1 and downgraded to floor status 4/3.  Infectious disease consulted and continued patient on Zosyn course to be completed through  4/17/2025.  General surgery consulted for right keiry/perineal abscess and performed incision and drainage with Penrose drain 3/31 with subsequent repeat I&D 4/2 and 4/8.  Surgery requests wound VAC be changed by wound care Tuesdays and Thursdays.     LTACH Course  She transferred from [facility] to [place] for wound cares, therapies, and complex medical care.   4/12-4/14: Needing BiPAP overnight.  4/17: successfully weaned off NC O2 during the day  4/20- no events overnight , stable     DVT Prophylaxis: Apixaban 5 mg po bid    Barriers to Discharge:  Large right groin wound requiring frequent cares, wound vac, IV abx, nocturnal BIPAP    Active LTACH PROBLEMS  Acute on Chronic Hypoxic Respiratory Failure  VAP, PTA  COPD  ADEEL  - Continue zosyn through 4/17  - inhalers as needed  - weaned off HFNC, weaned off NC during the day. Continues Bipap at night.  - RT following     Perineal Abscess  Condyloma Acuminata  s/p acyclovir and I&D x3  - zosyn through 4/17  - rectal tube discontinued  - noguera for wound protection  - VAC in place  - Continue wound care per [facility] nurse    PE, Acute, segmental, subsegmental  - On apixaban     T2DM  Poorly controlled with A1c 12.7. Used to be on insulin pump. No longer on it due to intellectual delay.  - BG labile earlier during admission, now stabilizing   - Continue Lantus 30 u daily  - I:C of 1:5 with meals  - ISS moderate    CKD3b  - Creatinine stable. Monitor    History of intellectual delay: supportive care         Diet: Snacks/Supplements Adult: Expedite Cup; With Meals  Snacks/Supplements Adult: Magic Cup; With Meals  Snacks/Supplements Adult: Gelatein 20 (sugar-free); With Meals  Combination Diet Moderate Consistent Carb (60 g CHO per Meal) Diet; Easy to Chew (level 7); Thin Liquids (level 0)    Noguera Catheter: PRESENT, indication: Wound deterioration and failed external collection device  Lines: None     Cardiac Monitoring: None  Code Status: Full Code      Clinically Significant  "Risk Factors               # Hypoalbuminemia: Lowest albumin = 2.9 g/dL at 4/13/2025  2:14 AM, will monitor as appropriate     # Hypertension: Noted on problem list           # DMII: A1C = 12.7 % (Ref range: <5.7 %) within past 6 months   # Obesity: Estimated body mass index is 31.62 kg/m  as calculated from the following:    Height as of 3/22/25: 1.651 m (5' 5\").    Weight as of this encounter: 86.2 kg (190 lb).      # COPD: noted on problem list        Social Drivers of Health    Tobacco Use: Medium Risk (4/2/2025)    Patient History     Smoking Tobacco Use: Former     Smokeless Tobacco Use: Never     Passive Exposure: Current   Physical Activity: Unknown (5/16/2024)    Exercise Vital Sign     Days of Exercise per Week: 0 days   Social Connections: Unknown (5/16/2024)    Social Connection and Isolation Panel [NHANES]     Frequency of Social Gatherings with Friends and Family: More than three times a week          Disposition Plan     Medically Ready for Discharge: Anticipated in 5+ Days             Erwin Bruce MD  Hospitalist Service  LTACH  Securely message with [employer] (more info)  Text page via [employer] Paging/Directory   ______________________________________________________________________    Interval History   Patient comfortable, slept well, hungry for breakfast. Walked in hallway yesterday. Boyfriend is 20 yrs her antoine, smokes.    Physical Exam   Vital Signs: Temp: 97.9  F (36.6  C) Temp src: Axillary BP: 136/68 Pulse: 86   Resp: 18 SpO2: 96 % O2 Device: (S) None (Room air)    Weight: 190 lbs 0 oz    Vitals and nursing note reviewed.   Constitutional:  Well developed, obese adult female, in no acute distress   HEENT:      Eyes: Vision grossly intact, Conjunctivae clear without bleeding and without jaundice.      Head: Normocephalic and atraumatic.      Nares: without obstruction, bleeding or discharge.  Skin: Skin is warm and well perfused.   Pulmonary: Lungs clear to auscultation bilaterally  Cardiac: " RRR, no clinically significant murmur, gallop, rub  GI: Soft, NT, BS+  Neurological: Face is symmetric. Speech fluent, clear, appropriate. Oriented to self, time and place. Moves all extremities equally, 5/5 strength bilaterally.     Medical Decision Making       25 MINUTES SPENT BY ME on the date of service doing chart review, history, exam, documentation & further activities per the note.      Data   ------------------------- PAST 24 HR DATA REVIEWED -----------------------------------------------    I have personally reviewed the following data over the past 24 hrs:    N/A  \   N/A   / 715 (H)     N/A N/A N/A /  247 (H)   N/A N/A 1.17 (H) \       Imaging results reviewed over the past 24 hrs:   No results found for this or any previous visit (from the past 24 hours).

## 2025-04-20 NOTE — PLAN OF CARE
Goal Outcome Evaluation:     Pt was on BiPap ST 18/10 RR 16 21%  at noc for 9.5hr, jose well, placed on RA at 0755 a.m.     RR 18-24. HR , BS dim t/o before nebs, clear to dim after.Sats 92-97% on RA, the order is to kep sats >88%      Albuterol TID, Nebusal 3% BID, given via m-piece, pt was awake, jose well    Cont to monitor and treat

## 2025-04-20 NOTE — PLAN OF CARE
Goal Outcome Evaluation:      Plan of Care Reviewed With: patient    Overall Patient Progress: no change  A&OX4 . Pleasant . Denied any pain . VSS . Afebrile . On RA during the day able to maintain her sat> 94% .   Right groin to wound VAC @125 Patent . Right labia dressing changed per order . Landrum catheter .   Stable. Continue to monitor and report any concerns.

## 2025-04-20 NOTE — PLAN OF CARE
Problem: Adult Inpatient Plan of Care  Goal: Optimal Comfort and Wellbeing  Outcome: Progressing     Pt resting quietly in bed at start of shift, denies pain. Pt is soft spoken and speech is hesitant at times. Noted hand tremor when patient picking up water glass. Bipap overnight. Right groin wound vac patent. Landrum patent with approx 1000 mL urine output. HS blood sugar 179.

## 2025-04-21 ENCOUNTER — APPOINTMENT (OUTPATIENT)
Dept: PHYSICAL THERAPY | Facility: CLINIC | Age: 60
DRG: 602 | End: 2025-04-21
Attending: INTERNAL MEDICINE
Payer: COMMERCIAL

## 2025-04-21 ENCOUNTER — APPOINTMENT (OUTPATIENT)
Dept: OCCUPATIONAL THERAPY | Facility: CLINIC | Age: 60
End: 2025-04-21
Attending: INTERNAL MEDICINE
Payer: COMMERCIAL

## 2025-04-21 LAB
ALT SERPL W P-5'-P-CCNC: 13 U/L (ref 0–50)
ANION GAP SERPL CALCULATED.3IONS-SCNC: 10 MMOL/L (ref 7–15)
AST SERPL W P-5'-P-CCNC: 18 U/L (ref 0–45)
BASOPHILS # BLD AUTO: 0.1 10E3/UL (ref 0–0.2)
BASOPHILS NFR BLD AUTO: 1 %
BUN SERPL-MCNC: 35.4 MG/DL (ref 8–23)
CALCIUM SERPL-MCNC: 10 MG/DL (ref 8.8–10.4)
CHLORIDE SERPL-SCNC: 102 MMOL/L (ref 98–107)
CREAT SERPL-MCNC: 1.14 MG/DL (ref 0.51–0.95)
EGFRCR SERPLBLD CKD-EPI 2021: 55 ML/MIN/1.73M2
EOSINOPHIL # BLD AUTO: 0.2 10E3/UL (ref 0–0.7)
EOSINOPHIL NFR BLD AUTO: 3 %
ERYTHROCYTE [DISTWIDTH] IN BLOOD BY AUTOMATED COUNT: 14.6 % (ref 10–15)
ERYTHROCYTE [DISTWIDTH] IN BLOOD BY AUTOMATED COUNT: 14.8 % (ref 10–15)
GLUCOSE BLDC GLUCOMTR-MCNC: 117 MG/DL (ref 70–99)
GLUCOSE BLDC GLUCOMTR-MCNC: 132 MG/DL (ref 70–99)
GLUCOSE BLDC GLUCOMTR-MCNC: 150 MG/DL (ref 70–99)
GLUCOSE BLDC GLUCOMTR-MCNC: 176 MG/DL (ref 70–99)
GLUCOSE SERPL-MCNC: 178 MG/DL (ref 70–99)
HCO3 SERPL-SCNC: 28 MMOL/L (ref 22–29)
HCT VFR BLD AUTO: 34.8 % (ref 35–47)
HCT VFR BLD AUTO: 35 % (ref 35–47)
HGB BLD-MCNC: 11.2 G/DL (ref 11.7–15.7)
HGB BLD-MCNC: 11.3 G/DL (ref 11.7–15.7)
IMM GRANULOCYTES # BLD: 0.2 10E3/UL
IMM GRANULOCYTES NFR BLD: 2 %
LYMPHOCYTES # BLD AUTO: 2.1 10E3/UL (ref 0.8–5.3)
LYMPHOCYTES NFR BLD AUTO: 22 %
MAGNESIUM SERPL-MCNC: 1.7 MG/DL (ref 1.7–2.3)
MCH RBC QN AUTO: 30.9 PG (ref 26.5–33)
MCH RBC QN AUTO: 31 PG (ref 26.5–33)
MCHC RBC AUTO-ENTMCNC: 32.2 G/DL (ref 31.5–36.5)
MCHC RBC AUTO-ENTMCNC: 32.3 G/DL (ref 31.5–36.5)
MCV RBC AUTO: 96 FL (ref 78–100)
MCV RBC AUTO: 96 FL (ref 78–100)
MONOCYTES # BLD AUTO: 1.3 10E3/UL (ref 0–1.3)
MONOCYTES NFR BLD AUTO: 14 %
NEUTROPHILS # BLD AUTO: 5.8 10E3/UL (ref 1.6–8.3)
NEUTROPHILS NFR BLD AUTO: 59 %
PLAT MORPH BLD: NORMAL
PLATELET # BLD AUTO: 628 10E3/UL (ref 150–450)
PLATELET # BLD AUTO: 650 10E3/UL (ref 150–450)
POTASSIUM SERPL-SCNC: 4.6 MMOL/L (ref 3.4–5.3)
RBC # BLD AUTO: 3.62 10E6/UL (ref 3.8–5.2)
RBC # BLD AUTO: 3.64 10E6/UL (ref 3.8–5.2)
RBC MORPH BLD: NORMAL
RETICS # AUTO: 0.12 10E6/UL (ref 0.03–0.1)
RETICS/RBC NFR AUTO: 3.2 % (ref 0.5–2)
SODIUM SERPL-SCNC: 140 MMOL/L (ref 135–145)
WBC # BLD AUTO: 9.7 10E3/UL (ref 4–11)
WBC # BLD AUTO: 9.7 10E3/UL (ref 4–11)

## 2025-04-21 PROCEDURE — 85045 AUTOMATED RETICULOCYTE COUNT: CPT | Performed by: INTERNAL MEDICINE

## 2025-04-21 PROCEDURE — 94660 CPAP INITIATION&MGMT: CPT

## 2025-04-21 PROCEDURE — 84460 ALANINE AMINO (ALT) (SGPT): CPT | Performed by: INTERNAL MEDICINE

## 2025-04-21 PROCEDURE — 97116 GAIT TRAINING THERAPY: CPT | Mod: GP

## 2025-04-21 PROCEDURE — 250N000009 HC RX 250: Performed by: STUDENT IN AN ORGANIZED HEALTH CARE EDUCATION/TRAINING PROGRAM

## 2025-04-21 PROCEDURE — 999N000157 HC STATISTIC RCP TIME EA 10 MIN

## 2025-04-21 PROCEDURE — 250N000013 HC RX MED GY IP 250 OP 250 PS 637: Performed by: STUDENT IN AN ORGANIZED HEALTH CARE EDUCATION/TRAINING PROGRAM

## 2025-04-21 PROCEDURE — 83735 ASSAY OF MAGNESIUM: CPT | Performed by: STUDENT IN AN ORGANIZED HEALTH CARE EDUCATION/TRAINING PROGRAM

## 2025-04-21 PROCEDURE — 99232 SBSQ HOSP IP/OBS MODERATE 35: CPT | Performed by: INTERNAL MEDICINE

## 2025-04-21 PROCEDURE — 36415 COLL VENOUS BLD VENIPUNCTURE: CPT | Performed by: STUDENT IN AN ORGANIZED HEALTH CARE EDUCATION/TRAINING PROGRAM

## 2025-04-21 PROCEDURE — 84450 TRANSFERASE (AST) (SGOT): CPT | Performed by: INTERNAL MEDICINE

## 2025-04-21 PROCEDURE — 97530 THERAPEUTIC ACTIVITIES: CPT | Mod: GP

## 2025-04-21 PROCEDURE — 94640 AIRWAY INHALATION TREATMENT: CPT | Mod: 76

## 2025-04-21 PROCEDURE — 120N000017 HC R&B RESPIRATORY CARE

## 2025-04-21 PROCEDURE — 82565 ASSAY OF CREATININE: CPT | Performed by: STUDENT IN AN ORGANIZED HEALTH CARE EDUCATION/TRAINING PROGRAM

## 2025-04-21 PROCEDURE — 36415 COLL VENOUS BLD VENIPUNCTURE: CPT | Performed by: INTERNAL MEDICINE

## 2025-04-21 PROCEDURE — 97535 SELF CARE MNGMENT TRAINING: CPT | Mod: GO | Performed by: OCCUPATIONAL THERAPIST

## 2025-04-21 PROCEDURE — 94640 AIRWAY INHALATION TREATMENT: CPT

## 2025-04-21 RX ADMIN — ANORECTAL OINTMENT: 15.7; .44; 24; 20.6 OINTMENT TOPICAL at 21:33

## 2025-04-21 RX ADMIN — INSULIN ASPART 22 UNITS: 100 INJECTION, SOLUTION INTRAVENOUS; SUBCUTANEOUS at 12:55

## 2025-04-21 RX ADMIN — INSULIN GLARGINE 32 UNITS: 100 INJECTION, SOLUTION SUBCUTANEOUS at 21:33

## 2025-04-21 RX ADMIN — APIXABAN 5 MG: 5 TABLET, FILM COATED ORAL at 21:30

## 2025-04-21 RX ADMIN — ANORECTAL OINTMENT: 15.7; .44; 24; 20.6 OINTMENT TOPICAL at 16:02

## 2025-04-21 RX ADMIN — UMECLIDINIUM BROMIDE AND VILANTEROL TRIFENATATE 1 PUFF: 62.5; 25 POWDER RESPIRATORY (INHALATION) at 08:29

## 2025-04-21 RX ADMIN — ALBUTEROL SULFATE 2.5 MG: 2.5 SOLUTION RESPIRATORY (INHALATION) at 21:08

## 2025-04-21 RX ADMIN — INSULIN ASPART 2 UNITS: 100 INJECTION, SOLUTION INTRAVENOUS; SUBCUTANEOUS at 08:26

## 2025-04-21 RX ADMIN — SODIUM CHLORIDE SOLN NEBU 3% 3 ML: 3 NEBU SOLN at 21:08

## 2025-04-21 RX ADMIN — AMLODIPINE BESYLATE 5 MG: 5 TABLET ORAL at 08:26

## 2025-04-21 RX ADMIN — ALBUTEROL SULFATE 2.5 MG: 2.5 SOLUTION RESPIRATORY (INHALATION) at 07:45

## 2025-04-21 RX ADMIN — ATORVASTATIN CALCIUM 40 MG: 40 TABLET, FILM COATED ORAL at 21:30

## 2025-04-21 RX ADMIN — ALBUTEROL SULFATE 2.5 MG: 2.5 SOLUTION RESPIRATORY (INHALATION) at 14:27

## 2025-04-21 RX ADMIN — INSULIN ASPART 15 UNITS: 100 INJECTION, SOLUTION INTRAVENOUS; SUBCUTANEOUS at 08:32

## 2025-04-21 RX ADMIN — SODIUM CHLORIDE SOLN NEBU 3% 3 ML: 3 NEBU SOLN at 07:45

## 2025-04-21 RX ADMIN — ANORECTAL OINTMENT: 15.7; .44; 24; 20.6 OINTMENT TOPICAL at 08:29

## 2025-04-21 RX ADMIN — APIXABAN 5 MG: 5 TABLET, FILM COATED ORAL at 08:26

## 2025-04-21 RX ADMIN — INSULIN ASPART 8 UNITS: 100 INJECTION, SOLUTION INTRAVENOUS; SUBCUTANEOUS at 17:34

## 2025-04-21 ASSESSMENT — ACTIVITIES OF DAILY LIVING (ADL)
ADLS_ACUITY_SCORE: 61
ADLS_ACUITY_SCORE: 61
ADLS_ACUITY_SCORE: 65
ADLS_ACUITY_SCORE: 65
ADLS_ACUITY_SCORE: 61
ADLS_ACUITY_SCORE: 65
ADLS_ACUITY_SCORE: 61
ADLS_ACUITY_SCORE: 65
ADLS_ACUITY_SCORE: 61
ADLS_ACUITY_SCORE: 61
ADLS_ACUITY_SCORE: 63
ADLS_ACUITY_SCORE: 61
ADLS_ACUITY_SCORE: 61
ADLS_ACUITY_SCORE: 63

## 2025-04-21 NOTE — PROGRESS NOTES
Recent Labs   Lab Test 04/11/25  0552 04/10/25  0514 04/09/25  0531    266 263        6:21 AM 2 d ago 3 d ago 4 d ago 6 d ago 7 d ago 9 d ago    Platelet Count  150 - 450 10e3/uL 715 High  723 High  692 High  682 High  512 High  411 244     Of note, Marly's platelet count has nearly tripled over the last 9 days. No documented history of thrombocytosis. History of a subsegmental PE now on apixaban. On-call physician consulted, he states it is possibly reactive in nature and will pass the concern on to the attending in the morning.     Karolina Alfaro RN

## 2025-04-21 NOTE — PROGRESS NOTES
On RA during day. RR 20, HR 95, SpO2 95%    BS clear, diminished. Increased aeration post neb.     Albuterol TID. NaCl 3% BID.    RT will continue to follow.

## 2025-04-21 NOTE — PLAN OF CARE
Goal Outcome Evaluation:       Problem: Wound  Goal: Optimal Coping  Outcome: Progressing  Intervention: Support Patient and Family Response  Recent Flowsheet Documentation  Taken 4/20/2025 2230 by Phil Reyes RN  Supportive Measures:   active listening utilized   decision-making supported  Family/Support System Care: (not at bed side at this moment.) other (see comments)  Goal: Optimal Functional Ability  Outcome: Progressing  Goal: Absence of Infection Signs and Symptoms  Outcome: Progressing  Intervention: Prevent or Manage Infection  Recent Flowsheet Documentation  Taken 4/20/2025 2230 by Phil Reyes RN  Infection Management: aseptic technique maintained  Isolation Precautions: protective environment maintained  Goal: Improved Oral Intake  Outcome: Progressing  Intervention: Promote and Optimize Oral Intake  Recent Flowsheet Documentation  Taken 4/20/2025 2230 by Phil Reyes RN  Nutrition Interventions: supplemental drinks provided  Oral Nutrition Promotion: rest periods promoted  Nutrition Support Management: weight trending reviewed  Goal: Optimal Pain Control and Function  Outcome: Progressing  Intervention: Prevent or Manage Pain  Recent Flowsheet Documentation  Taken 4/20/2025 2230 by Phil Reyes RN  Sleep/Rest Enhancement: awakenings minimized  Goal: Skin Health and Integrity  Outcome: Progressing  Goal: Optimal Wound Healing  Outcome: Progressing  Intervention: Promote Wound Healing  Recent Flowsheet Documentation  Taken 4/20/2025 2230 by Phil Reyes RN  Sleep/Rest Enhancement: awakenings minimized        Phil Reyes RN     Pt alert and oriented.  VSS No sign of pain or discomfort seen at this moment. Denies nausea, dizziness, shortness of breath and lightheadedness. On BIPAP during night, tolerated the BiPAP.. On O2 via naso canula. Adequate intake and output. On consistent carb count. On IV antibiotic. BS check  AC an HS. Request for bed pan and  continent of Urine (use noguera for urinary elimination). Skin looks at base line for the patient. Uses call light appropriately.  Mood pleasant and compliant for cares and medication. Handling hospitalization well.  Continue to monitor.       Phil Reyes RN

## 2025-04-21 NOTE — PROGRESS NOTES
[facility]    Medicine Progress Note - Hospitalist Service    Date of Admission:  4/11/2025    Assessment & Plan   Acute Care Hospital Course  PMHx: Marly Cannon is a 59 year old woman with history of intellectual disability & memory issues, COPD, untreated ADEEL, tobacco use d/o, HTN, pAFib, & poorly controlled T2DM s/p previous hospitalization for DKA (3/18 - 3/20).  CC: Acute onset dyspnea  HPI: Presented second time to hospital in [place] on 3/22/2025 with complaints of dyspnea, hyperglycemia, and chest pain. She was found to have an YADIRA, LLL PE, DKA, and A-fib with RVR. She was started on an insulin drip, therapeutic dose enoxaparin, Zosyn for possible pneumonia, and diltiazem drip for management of her RVR. Her DKA resolved, she was switched from diltiazem to amiodarone drip for management of A-fib with RVR; however, her degree of respiratory failure was still concerning and she was intubated 3/23 for management of worsening hypoxia.   She had a repeat CTA that demonstrated progression of her PE and RLL collapse concerning for mucous plugging and pneumonia.   She was switched from enoxaparin to heparin drip and her antibiotics were broadened to vancomycin and Zosyn. She was on norepinephrine for several days d/t hypotension attributed to sedation & sepsis-related vasoplegia. It appears that she has been doing fairly long SBTs since 3/26, which seemed to be going reasonably well per RT documentation. Primary team has been having issues w/ progressively increasing FiO2 needs. She had a sputum culture grow Candida & has been on fluconazole since 3/25. Transferred to [facility] d/t concerns that she might require a tracheostomy secondary to her inability to liberate from the vent.   At [facility] patient was admitted to the ICU on a ventilator, successfully extubated 4/1 and downgraded to floor status 4/3.  Infectious disease consulted and continued patient on Zosyn course to be completed through  4/17/2025.  General surgery consulted for right keiry/perineal abscess and performed incision and drainage with Penrose drain 3/31 with subsequent repeat I&D 4/2 and 4/8.  Surgery requests wound VAC be changed by wound care Tuesdays and Thursdays.     LTACH Course  She transferred from [facility] to [facility] for wound cares, therapies, and complex medical care.   4/12-4/14: Needing BiPAP overnight.  4/17: successfully weaned off NC O2 during the day  4/20- no events overnight , stable     DVT Prophylaxis: Apixaban 5 mg po bid    Barriers to Discharge:  Large right groin wound requiring frequent cares, wound vac, IV abx, nocturnal BIPAP    Active LTACH PROBLEMS  Acute on Chronic Hypoxic Respiratory Failure  VAP, PTA - resolved  COPD  ADEEL  - Completed zosyn 4/17  - inhalers as needed  - weaned off HFNC, weaned off NC during the day. Continues Bipap at night.  - RT following    Perineal Abscess  Condyloma Acuminata  s/p acyclovir and I&D x3  - zosyn through 4/17  - rectal tube discontinued  - noguera for wound protection  - VAC in place  - Continue wound care per [facility] nurse    PE, Acute, segmental, subsegmental  - On apixaban     Reactive thrombocytosis:  Platelets historically in normal range (reviewed last 3 years)  Rising over last 10 days to 700,000  Likely  reactive - reviewed flow chart for acute thrombocytosis, posted in note  Check peripheral smear, liver enzymes    T2DM  Poorly controlled with A1c 12.7. Used to be on insulin pump. No longer on it due to intellectual delay.  - BG labile earlier during admission, now stabilizing   - Increase Lantus 30 unit(s)->32 units qhs  - I:C of 1:5 -> I:C 1:4 with meals  - ISS high    CKD3b  - Creatinine stable. Monitor    Elevated Hgb in past  Historically high  Consistent with smoking (high CO), untreated ADEEL    History of intellectual delay: supportive care.          Diet: Snacks/Supplements Adult: Expedite Cup; With Meals  Snacks/Supplements Adult: Magic Cup; With  "Meals  Snacks/Supplements Adult: Gelatein 20 (sugar-free); With Meals  Combination Diet Moderate Consistent Carb (60 g CHO per Meal) Diet; Easy to Chew (level 7); Thin Liquids (level 0)    Landrum Catheter: PRESENT, indication: Wound deterioration and failed external collection device  Lines: None     Cardiac Monitoring: None  Code Status: Full Code      Clinically Significant Risk Factors               # Hypoalbuminemia: Lowest albumin = 2.9 g/dL at 4/13/2025  2:14 AM, will monitor as appropriate     # Hypertension: Noted on problem list           # DMII: A1C = 12.7 % (Ref range: <5.7 %) within past 6 months   # Obesity: Estimated body mass index is 31.62 kg/m  as calculated from the following:    Height as of 3/22/25: 1.651 m (5' 5\").    Weight as of this encounter: 86.2 kg (190 lb).      # COPD: noted on problem list        Social Drivers of Health    Tobacco Use: Medium Risk (4/2/2025)    Patient History     Smoking Tobacco Use: Former     Smokeless Tobacco Use: Never     Passive Exposure: Current   Physical Activity: Unknown (5/16/2024)    Exercise Vital Sign     Days of Exercise per Week: 0 days   Social Connections: Unknown (5/16/2024)    Social Connection and Isolation Panel [NHANES]     Frequency of Social Gatherings with Friends and Family: More than three times a week          Disposition Plan     Medically Ready for Discharge: Anticipated in 5+ Days             Andrez Mueller MD  Hospitalist Service  LTACH  Securely message with [employer] (more info)  Text page via "[employer]" Paging/Directory   ______________________________________________________________________    Interval History   Patient feeling well. No complaints.    Physical Exam   Vital Signs: Temp: 98.9  F (37.2  C) Temp src: Oral BP: 135/69 Pulse: 86   Resp: 18 SpO2: 96 % O2 Device: BiPAP/CPAP    Weight: 190 lbs 0 oz    Vitals and nursing note reviewed.   Constitutional:  Well developed, obese adult female, in no acute distress   HEENT:      Eyes: " Vision grossly intact, Conjunctivae clear without bleeding and without jaundice.      Head: Normocephalic and atraumatic.      Nares: without obstruction, bleeding or discharge.  Skin: Skin is warm and well perfused.   Pulmonary: Lungs clear to auscultation bilaterally  Cardiac: RRR, no clinically significant murmur, gallop, rub  GI: Soft, NT, BS+  Neurological: Face is symmetric. Speech fluent, clear, appropriate. Oriented to self, time and place. Moves all extremities equally, 5/5 strength bilaterally.       Medical Decision Making       25 MINUTES SPENT BY ME on the date of service doing chart review, history, exam, documentation & further activities per the note.      Data   ------------------------- PAST 24 HR DATA REVIEWED -----------------------------------------------    I have personally reviewed the following data over the past 24 hrs:    9.7  \   11.3 (L)   / 628 (H)     140 102 35.4 (H) /  176 (H)   4.6 28 1.14 (H) \     ALT: 13 AST: 18 AP: N/A TBILI: N/A   ALB: N/A TOT PROTEIN: N/A LIPASE: N/A     Ferritin:  N/A % Retic:  3.2 (H) LDH:  N/A       Imaging results reviewed over the past 24 hrs:   No results found for this or any previous visit (from the past 24 hours).

## 2025-04-21 NOTE — PLAN OF CARE
Problem: Pain Acute  Goal: Optimal Pain Control and Function  4/21/2025 1216 by Carmela Navarrete, RN  Outcome: Progressing  4/21/2025 1216 by Carmela Navarrete RN  Outcome: Progressing   Goal Outcome Evaluation: Pt denied pain.  Pt alert and oriented; vital signs stable; no PRNs given. Pt had two continent BMs this shift; spent time up in chair.  Wound care was done.  All care needs met.      Carmela Navarrete, RN

## 2025-04-21 NOTE — PROGRESS NOTES
Pt continues on RA daytime.  Pt wearing Bipap overnight 18/10, rate 16, 21%.  Pt has redness at bridge of nose.  Placed liquicell at bedside for use when on bipap.  Nebs given as ordered.  Blood pressure 133/71, pulse 93, temperature 99  F (37.2  C), temperature source Oral, resp. rate 16, weight 82.6 kg (182 lb), SpO2 92%.

## 2025-04-21 NOTE — PROGRESS NOTES
Care Management Follow Up    Length of Stay (days): 10    Expected Discharge Date: 04/30/2025     Concerns to be Addressed:       Patient plan of care discussed at interdisciplinary rounds: Yes    Anticipated Discharge Disposition:  home vs TCU              Anticipated Discharge Services:    Anticipated Discharge DME:      Patient/family educated on Medicare website which has current facility and service quality ratings:    Education Provided on the Discharge Plan:    Patient/Family in Agreement with the Plan:      Referrals Placed by CM/SW:    Private pay costs discussed: Not applicable    Discussed  Partnership in Safe Discharge Planning  document with patient/family: Yes: Discussed with patient during initial care management assessment.     Handoff Completed: No, handoff not indicated or clinically appropriate    Additional Information:  Patient was discussed with the interdisciplinary team this week.  Reason patient is not able to discharge to a lower level of care:  still has wound vac on groin wound.  She is now off O2 during the day, but requires bipap at night. Patient's zosyn was done on 4/17/25.  RN noted concern about patient's increased platelets on 4/20.  Patient is ambulating with PT  and making progress with OT.  Recommend TCU at discharge.      Next Steps:  discharge planning when appropriate.    Therese Woodson RN, BSN  Care Coordination  [facility]  [phone]

## 2025-04-22 ENCOUNTER — APPOINTMENT (OUTPATIENT)
Dept: PHYSICAL THERAPY | Facility: CLINIC | Age: 60
DRG: 602 | End: 2025-04-22
Attending: INTERNAL MEDICINE
Payer: COMMERCIAL

## 2025-04-22 ENCOUNTER — TELEPHONE (OUTPATIENT)
Dept: SURGERY | Facility: CLINIC | Age: 60
End: 2025-04-22
Payer: COMMERCIAL

## 2025-04-22 ENCOUNTER — APPOINTMENT (OUTPATIENT)
Dept: OCCUPATIONAL THERAPY | Facility: CLINIC | Age: 60
End: 2025-04-22
Attending: INTERNAL MEDICINE
Payer: COMMERCIAL

## 2025-04-22 LAB
GLUCOSE BLDC GLUCOMTR-MCNC: 115 MG/DL (ref 70–99)
GLUCOSE BLDC GLUCOMTR-MCNC: 136 MG/DL (ref 70–99)
GLUCOSE BLDC GLUCOMTR-MCNC: 190 MG/DL (ref 70–99)
GLUCOSE BLDC GLUCOMTR-MCNC: 214 MG/DL (ref 70–99)

## 2025-04-22 PROCEDURE — 250N000009 HC RX 250: Performed by: STUDENT IN AN ORGANIZED HEALTH CARE EDUCATION/TRAINING PROGRAM

## 2025-04-22 PROCEDURE — 250N000009 HC RX 250: Performed by: INTERNAL MEDICINE

## 2025-04-22 PROCEDURE — 97606 NEG PRS WND THER DME>50 SQCM: CPT

## 2025-04-22 PROCEDURE — 250N000013 HC RX MED GY IP 250 OP 250 PS 637: Performed by: INTERNAL MEDICINE

## 2025-04-22 PROCEDURE — 94640 AIRWAY INHALATION TREATMENT: CPT | Mod: 76

## 2025-04-22 PROCEDURE — 97110 THERAPEUTIC EXERCISES: CPT | Mod: GP | Performed by: PHYSICAL THERAPIST

## 2025-04-22 PROCEDURE — 97530 THERAPEUTIC ACTIVITIES: CPT | Mod: GP | Performed by: PHYSICAL THERAPIST

## 2025-04-22 PROCEDURE — 250N000013 HC RX MED GY IP 250 OP 250 PS 637: Performed by: STUDENT IN AN ORGANIZED HEALTH CARE EDUCATION/TRAINING PROGRAM

## 2025-04-22 PROCEDURE — 999N000157 HC STATISTIC RCP TIME EA 10 MIN

## 2025-04-22 PROCEDURE — G0463 HOSPITAL OUTPT CLINIC VISIT: HCPCS | Mod: 25

## 2025-04-22 PROCEDURE — 120N000017 HC R&B RESPIRATORY CARE

## 2025-04-22 PROCEDURE — 97116 GAIT TRAINING THERAPY: CPT | Mod: GP | Performed by: PHYSICAL THERAPIST

## 2025-04-22 PROCEDURE — 94640 AIRWAY INHALATION TREATMENT: CPT

## 2025-04-22 PROCEDURE — 97535 SELF CARE MNGMENT TRAINING: CPT | Mod: GO | Performed by: OCCUPATIONAL THERAPIST

## 2025-04-22 PROCEDURE — 99231 SBSQ HOSP IP/OBS SF/LOW 25: CPT | Performed by: INTERNAL MEDICINE

## 2025-04-22 PROCEDURE — 94660 CPAP INITIATION&MGMT: CPT

## 2025-04-22 RX ORDER — MICONAZOLE NITRATE 20 MG/G
CREAM TOPICAL 2 TIMES DAILY
Status: COMPLETED | OUTPATIENT
Start: 2025-04-22 | End: 2025-04-28

## 2025-04-22 RX ORDER — KETOCONAZOLE 20 MG/G
CREAM TOPICAL DAILY
Status: DISCONTINUED | OUTPATIENT
Start: 2025-04-22 | End: 2025-04-22

## 2025-04-22 RX ORDER — BACITRACIN ZINC 500 [USP'U]/G
OINTMENT TOPICAL 2 TIMES DAILY
Status: COMPLETED | OUTPATIENT
Start: 2025-04-22 | End: 2025-04-24

## 2025-04-22 RX ORDER — AMOXICILLIN 500 MG/1
500 CAPSULE ORAL EVERY 8 HOURS SCHEDULED
Status: COMPLETED | OUTPATIENT
Start: 2025-04-22 | End: 2025-04-26

## 2025-04-22 RX ADMIN — INSULIN ASPART 26 UNITS: 100 INJECTION, SOLUTION INTRAVENOUS; SUBCUTANEOUS at 12:37

## 2025-04-22 RX ADMIN — ALBUTEROL SULFATE 2.5 MG: 2.5 SOLUTION RESPIRATORY (INHALATION) at 14:23

## 2025-04-22 RX ADMIN — MICONAZOLE NITRATE: 2 CREAM TOPICAL at 21:31

## 2025-04-22 RX ADMIN — BACITRACIN ZINC: 500 OINTMENT TOPICAL at 08:31

## 2025-04-22 RX ADMIN — APIXABAN 5 MG: 5 TABLET, FILM COATED ORAL at 08:30

## 2025-04-22 RX ADMIN — INSULIN ASPART 3 UNITS: 100 INJECTION, SOLUTION INTRAVENOUS; SUBCUTANEOUS at 12:35

## 2025-04-22 RX ADMIN — ANORECTAL OINTMENT: 15.7; .44; 24; 20.6 OINTMENT TOPICAL at 08:31

## 2025-04-22 RX ADMIN — ANORECTAL OINTMENT: 15.7; .44; 24; 20.6 OINTMENT TOPICAL at 21:30

## 2025-04-22 RX ADMIN — INSULIN ASPART 3 UNITS: 100 INJECTION, SOLUTION INTRAVENOUS; SUBCUTANEOUS at 08:33

## 2025-04-22 RX ADMIN — AMOXICILLIN 500 MG: 500 CAPSULE ORAL at 21:30

## 2025-04-22 RX ADMIN — INSULIN ASPART 15 UNITS: 100 INJECTION, SOLUTION INTRAVENOUS; SUBCUTANEOUS at 17:42

## 2025-04-22 RX ADMIN — MICONAZOLE NITRATE: 2 CREAM TOPICAL at 08:31

## 2025-04-22 RX ADMIN — BACITRACIN ZINC: 500 OINTMENT TOPICAL at 21:30

## 2025-04-22 RX ADMIN — AMLODIPINE BESYLATE 5 MG: 5 TABLET ORAL at 08:31

## 2025-04-22 RX ADMIN — AMOXICILLIN 500 MG: 500 CAPSULE ORAL at 14:12

## 2025-04-22 RX ADMIN — SODIUM CHLORIDE SOLN NEBU 3% 3 ML: 3 NEBU SOLN at 19:32

## 2025-04-22 RX ADMIN — SODIUM CHLORIDE SOLN NEBU 3% 3 ML: 3 NEBU SOLN at 07:07

## 2025-04-22 RX ADMIN — ANORECTAL OINTMENT: 15.7; .44; 24; 20.6 OINTMENT TOPICAL at 14:12

## 2025-04-22 RX ADMIN — ATORVASTATIN CALCIUM 40 MG: 40 TABLET, FILM COATED ORAL at 21:30

## 2025-04-22 RX ADMIN — UMECLIDINIUM BROMIDE AND VILANTEROL TRIFENATATE 1 PUFF: 62.5; 25 POWDER RESPIRATORY (INHALATION) at 08:33

## 2025-04-22 RX ADMIN — AMOXICILLIN 500 MG: 500 CAPSULE ORAL at 08:32

## 2025-04-22 RX ADMIN — INSULIN GLARGINE 32 UNITS: 100 INJECTION, SOLUTION SUBCUTANEOUS at 21:31

## 2025-04-22 RX ADMIN — INSULIN ASPART 16 UNITS: 100 INJECTION, SOLUTION INTRAVENOUS; SUBCUTANEOUS at 08:36

## 2025-04-22 RX ADMIN — APIXABAN 5 MG: 5 TABLET, FILM COATED ORAL at 21:30

## 2025-04-22 RX ADMIN — ALBUTEROL SULFATE 2.5 MG: 2.5 SOLUTION RESPIRATORY (INHALATION) at 07:07

## 2025-04-22 RX ADMIN — ALBUTEROL SULFATE 2.5 MG: 2.5 SOLUTION RESPIRATORY (INHALATION) at 19:32

## 2025-04-22 ASSESSMENT — ACTIVITIES OF DAILY LIVING (ADL)
ADLS_ACUITY_SCORE: 65
ADLS_ACUITY_SCORE: 63
ADLS_ACUITY_SCORE: 62
ADLS_ACUITY_SCORE: 65
ADLS_ACUITY_SCORE: 63
ADLS_ACUITY_SCORE: 62
ADLS_ACUITY_SCORE: 62
ADLS_ACUITY_SCORE: 65
ADLS_ACUITY_SCORE: 64
ADLS_ACUITY_SCORE: 63
ADLS_ACUITY_SCORE: 62
ADLS_ACUITY_SCORE: 64
ADLS_ACUITY_SCORE: 63
ADLS_ACUITY_SCORE: 62
ADLS_ACUITY_SCORE: 62

## 2025-04-22 NOTE — PROGRESS NOTES
[facility] Nurse Inpatient Assessment     Consulted for: perineal    Summary: See updated VAC orders below    WO nurse follow-up plan: weekly    Patient History (according to provider note(s):      Marly Cannon is a 59 year old woman with history of intellectual disability & memory issues, COPD*on supplemental oxygen, untreated ADEEL, tobacco use d/o, HTN, pAFib, & poorly controlled T2DM w/ recent hospitalization for DKA (3/18 - 3/20), who presented to the hospital in [facility] on 3/22/2025 with complaints of dyspnea, hyperglycemia, and chest pain. She was found to have an YADIRA, LLL PE, DKA, and A-fib with RVR. She was started on an insulin drip, therapeutic dose enoxaparin, Zosyn for possible pneumonia, and diltiazem drip for management of her RVR. Her DKA has resolved, she was switched from diltiazem to amiodarone drip for management of A-fib with RVR; however, her degree of respiratory failure was still concerning and she was intubated 3/23 for management of worsening hypoxia. She had a repeat CTA that demonstrated progression of her PE and RLL collapse concerning for mucous plugging and pneumonia. She was switched from enoxaparin to heparin drip and her antibiotics were broadened to vancomycin and Zosyn. She was on norepinephrine for several days d/t hypotension attributed to sedation & sepsis-related vasoplegia. It appears that she has been doing fairly long SBTs since 3/26, which seemed to be going reasonably well per RT documentation. Primary team has been having issues w/ progressively increasing FiO2 needs. She had a sputum culture grow Candida & has been on fluconazole since 3/25. She has remained on the insulin drip, presumably to manage the persistent hyperglycemia during her steroid burst (for the presumed COPD exacerbation). She has received a fairly large volume of documented fluids, & was started on diuresis 3/30. She was transferred to [facility] d/t concerns  that she will require a tracheostomy secondary to her inability to liberate from the vent.     Preoperative Diagnosis: Perineal and right groin infection     Postoperative Diagnosis: Perineal and right groin necrotizing soft tissue infection     Procedure: Incision and drainage of perineum and right groin     Findings: Infection traveling up the right labia into the right groin.  Final debridement included a right groin defect measuring 18 x 8 x 5 cm and a perineal wound measuring 6 x 2 x 1 cm.       Assessment:      Wound location: Right groin      4/7/25                                                         4/15/25                                                      4/22/25    Wound due to: Surgical Wound   Wound history/plan of care:    Surgical date: 4/2 and 4/8   Service following: General Surgery  Date Negative Pressure Wound Therapy initiated: 04/09/25   Interventions in place: moisture/incontinence management  Is patient s nutritional status compromised? no   If yes, what interventions are in place? N/A  Reason for initiating vac therapy? Presence of co-morbidities, High risk of infections, and Need for accelerated granulation tissue  Which?of?the?following?co-morbidities?apply? Diabetes, Immobility, and Obesity  If diabetic is patient on a diabetic management program? Yes   Is osteomyelitis present in wound? no   If yes what treatments are in place? N/A  Wound due to: Surgical Wound  Wound history/plan of care: I&D 4/2/25 with second debridement on 4/8/25  Wound base: see photo - mix of adipose and granular tissue     Palpation of the wound bed: normal      Drainage:  scant     Description of drainage: serosanguinous     Measurements (length x width x depth, in cm): R groin wound measuring 3.5 cm x 15.5 cm x 3.5 cm     Tunneling: NA - the penrose drain remains in place between the two wounds (groin to labia) no true depth appreciable as healing in well. Still easy to move penrose drain.     Undermining:  NA  Periwound skin: Intact      Color: normal and consistent with surrounding tissue      Temperature: normal   Odor: none  Pain: facial expression of distress intermittently during cares; patient declined oral pain meds and had pain from 0 - 1/10 throughout most of the dressing change; 5/10 during dressing removal. Encouraged patient to take oral pain med prior to next dressing change.  Pain interventions prior to dressing change: slow and gentle cares  and distraction, pain meds, soaking dressing  Started white foam due to pain with foam removal  Treatment goal: Drainage control and Infection control/prevention  STATUS: improving  Supplies ordered: supplies stored on unit, discussed with RN, and discussed with patient      Number of foam pieces removed from a wound (excluding foam for bridge) : 1 piece white foam, 1 piece black foam  Verified this matched the number of foam pieces applied last dressing change: Yes  Number of foam pieces packed into wound (excluding foam for bridge) : 1 piece black foam    Pressure Injury Location: Bilateral buttocks (not assessed 4/22)     4/1 4/9  Last photo: 4/9  Wound type: Pressure Injury and Friction     Pressure Injury Stage: 2, present on admission versus IAD  Wound history/plan of care: There was a darker area to the gluteal cleft with some maceration; unclear if it is a developing DTPI but will monitor for changes - 4/9 Skin has sloughed off area over coccyx but remains partial--thickness at this time and measures 2 cm x 0.5 cm    Wound base: buttocks healed, dermis in gluteal cleft  Periwound skin: Intact      Color: normal and consistent with surrounding tissue      Temperature: normal   Calmoseptine  Treatment Plan:   Negative pressure wound therapy plan:  Wound location: Right groin   Change Days: Tues/ Fri   Supplies (including all accessories) used: medium Black foam   Cleanse with Vashe prior to  "replacing NPWT  Suction setting: -125   Methods used: Window paned all periwound skin with vac drape prior to applying sponge, Placed barrier ring into periwound creases to improve seal, and place white foam in base, then black foam, Adapr Ring along medial edge  PRIOR TO REMOVAL: soak dressing, turn off VAC when giving pain meds, use adhesive remover on tape    Staff RN to assess integrity of dressing and ensure suction is set at appropriate level every shift.   Date canister. Chart canister output every shift. Change cannister weekly and PRN if full/occluded     Remove foam dressing and replace with BID normal saline moist gauze dressing if:   -a dressing failure which cannot be repaired within 2 hours   -patient is discharging to home without a home pump   -patient is discharging to a facility outside the local area   -if a dressing is a \"Silver Foam\", remove before Radiation Therapy or MRI     The hospital VAC pump is not to be discharged with the patient. Please disconnect the patient from the machine prior to discharge.  If a home VAC pump has been delivered, connect the home cannister to dressing tubing and the cannister to the home pump, turn on home pump  If the patient is transferring to a nearby facility with a VAC, the tubing can be disconnected, clamp tubing and cover the end with a glove, then can be reconnected if within 2 hours  If transfer will be longer than 2 hours, dressing must be removed and placed with a wet to moist gauze dressing for transfer       R labia - daily and prn with any contamination  Cleanse with Vashe and place 1 fluff of Vashe moistened gauze into wound bed    Internal FMS - OK to remove if less than 200 mL output in a 24 hour period    Bilateral buttocks wounds: Every 3 days   Cleanse the area with wound cleanser and pat dry.  Apply No sting film barrier to periwound skin.  Cover wound with Sacral Mepilex (#010023)  Change dressing Q 3 days.  Turn and reposition Q 2hrs side to " side only.  Ensure pt has Pablo-cushion while sitting up in the chair.  If not in ICU: Ensure air pump on bed and set to Isoflex.  FYI- If pt has constant incontinent loose stools needing dressing changes Q shift please discontinue the Mepilex dressing and apply criticaid barrier paste BID and PRN.    Orders: Reviewed and Updated    RECOMMEND PRIMARY TEAM ORDER: None, at this time  Education provided: plan of care, wound progress, and Moisture management  Discussed plan of care with: Patient and Nurse; call out to surgeon's office for penrose drain removal - at [facility] versus Surgeon's office  Notify [facility] if wound(s) deteriorate.  Nursing to notify the Provider(s) and re-consult the [facility] Nurse if new skin concern.    DATA:     Current support surface: Standard  Standard gel mattress (Isoflex)  Containment of urine/stool: Incontinence Protocol and Indwelling catheter  BMI: Body mass index is 30.37 kg/m .   Active diet order: Orders Placed This Encounter      Combination Diet Moderate Consistent Carb (60 g CHO per Meal) Diet; Easy to Chew (level 7); Thin Liquids (level 0)     Output: I/O last 3 completed shifts:  In: 843 [P.O.:840; I.V.:3]  Out: 1025 [Urine:1025]     Labs:   Recent Labs   Lab 04/21/25  0814   HGB 11.2*   WBC 9.7     Pressure injury risk assessment:   Sensory Perception: 3-->slightly limited  Moisture: 3-->occasionally moist  Activity: 2-->chairfast  Mobility: 3-->slightly limited  Nutrition: 2-->probably inadequate  Friction and Shear: 1-->problem  Vicente Score: 14    Ivette Garcias, MSN RN CWOCN  Pager no longer is use, please contact through [employer] group: [facility] [employer]

## 2025-04-22 NOTE — TELEPHONE ENCOUNTER
Patient is s/p bedside I&D and Penrose placement 3/31 s/p incision and drainage of NSTI 4/2 with Penrose placements and takeback 4/8 for further washout and replacement of Penrose drain. Wound vac placed 4/9 on right groin wound and through labial tunnel, packing posterior labial wound   Received a call from the RN at the [facility] wanting to know if patient needed to come in for a followup visit. Confirmed with surgeon that if patient still has the penrose drain, she may follow up with [facility] for penrose drain removal. If patient no longer has drain, she may continue to follow up with [facility] for wound vac management. Caller is going to confirm that she still has the penrose drain and call back.    Gladys Carroll RN  [facility]  General Surgery  [address] [place] [id number]  Office:[phone]  Employed by [facility]

## 2025-04-22 NOTE — PROVIDER NOTIFICATION
04/21/25 2155   Equipment   Device Serial Number [id number]   CPAP/BiPAP/Settings   BIPAP/CPAP On Standby On   IPAP/EPAP (cmH2O) 18/10   Rate (breaths/min) 16   Oxygen (%) 21   Timed Inspiration (sec) 0.9   IPAP RISE  Settings (V60) 2   CPAP/BiPAP Patient Parameters   IPAP (cm H2O) 18 cmH2O   EPAP (cm H2O) 10 cmH2O   RR Total (breaths/min) 19 breaths/min   Vt (mL) 954 mL   Minute Ventilation (L/min) 17.2 L/min   Peak Inspiratory Pressure (cm H2O) 18 cmH2O   Pt.  Leak (L/min) 30 L/min     Kathy Saldivar RT on 4/22/2025 at 5:54 AM

## 2025-04-22 NOTE — PLAN OF CARE
Problem: Pain Acute  Goal: Optimal Pain Control and Function  Outcome: Progressing   Goal Outcome Evaluation: Pt denied pain.  Pt alert and oriented x 3; disoriented to situation. Vital signs stable. No PRNs given.  Wound vac changed by [facility] nurse.  All care needs met.      Carmela Navarrete RN

## 2025-04-22 NOTE — PROGRESS NOTES
Care Management Follow Up    Length of Stay (days): 11    Expected Discharge Date: 04/30/2025     Concerns to be Addressed:       Patient plan of care discussed at interdisciplinary rounds: Yes    Anticipated Discharge Disposition:  TCU more likely; Did reach out to patient's county SW today, Nelly, at [phone], to discuss that patient may benefit from more supportive cares, ie. AVERY?  Did discuss patient's ACE-III (57/100) score and her A1C lab at admission was over 12. Discussed that because patient has a very large wound requiring a wound vac, we are going to have a hard time finding a TCU in [place] that might be able to take her.       Anticipated Discharge Services:  TCU vs home care?  Anticipated Discharge DME:      Patient/family educated on Medicare website which has current facility and service quality ratings:  not yet  Education Provided on the Discharge Plan:    Patient/Family in Agreement with the Plan:      Referrals Placed by CM/SW:  not yet  Private pay costs discussed: Not applicable    Discussed  Partnership in Safe Discharge Planning  document with patient/family: Yes: discussed during initial care management assessment.     Handoff Completed: No, handoff not indicated or clinically appropriate    Additional Information:  Patient  - was using bipap at night - MD to trial patient on overnight O2 to see how she does.  Patient was still smoking prior to her hospitalization.  Per PT, patient is able to ambulate more now; able to go about 150' before she needs to take a break.    Next Steps:  patient still has a penrose drain in her groin wound - appears to be clamped off.  Charge RN to see if patient needs a surgical follow up visit to address this.  Still needing wound vac cares.  Will check to see if patient's home care agency in [place] would manage wound vac if patient wants to go home.  Her home care is [facility] [phone] - psmr a message for their intake  department today regarding this.    Therese Woodson, RN, BSN  Care Coordination  [facility]  [phone]

## 2025-04-22 NOTE — PROGRESS NOTES
[facility]    Medicine Progress Note - Hospitalist Service    Date of Admission:  4/11/2025    Assessment & Plan   Acute Care Hospital Course  PMHx: Marly Cannon is a 59 year old woman with history of intellectual disability & memory issues, COPD, untreated ADEEL, tobacco use d/o, HTN, pAFib, & poorly controlled T2DM s/p previous hospitalization for DKA (3/18 - 3/20).  CC: Acute onset dyspnea  HPI: Presented second time to hospital in [place] on 3/22/2025 with complaints of dyspnea, hyperglycemia, and chest pain. She was found to have an YADIRA, LLL PE, DKA, and A-fib with RVR. She was started on an insulin drip, therapeutic dose enoxaparin, Zosyn for possible pneumonia, and diltiazem drip for management of her RVR. Her DKA resolved, she was switched from diltiazem to amiodarone drip for management of A-fib with RVR; however, her degree of respiratory failure was still concerning and she was intubated 3/23 for management of worsening hypoxia.   She had a repeat CTA that demonstrated progression of her PE and RLL collapse concerning for mucous plugging and pneumonia.   She was switched from enoxaparin to heparin drip and her antibiotics were broadened to vancomycin and Zosyn. She was on norepinephrine for several days d/t hypotension attributed to sedation & sepsis-related vasoplegia. It appears that she has been doing fairly long SBTs since 3/26, which seemed to be going reasonably well per RT documentation. Primary team has been having issues w/ progressively increasing FiO2 needs. She had a sputum culture grow Candida & has been on fluconazole since 3/25. Transferred to [facility] d/t concerns that she might require a tracheostomy secondary to her inability to liberate from the vent.   At [facility] patient was admitted to the ICU on a ventilator, successfully extubated 4/1 and downgraded to floor status 4/3.  Infectious disease consulted and continued patient on Zosyn course to be completed through  4/17/2025.  General surgery consulted for right keiry/perineal abscess and performed incision and drainage with Penrose drain 3/31 with subsequent repeat I&D 4/2 and 4/8.  Surgery requests wound VAC be changed by wound care Tuesdays and Thursdays.     LTACH Course  She transferred from [facility] to [facility] for wound cares, therapies, and complex medical care.   4/12-4/14: Needing BiPAP overnight.  4/17: successfully weaned off NC O2 during the day  4/20- no events overnight , stable     DVT Prophylaxis: Apixaban 5 mg po bid    Barriers to Discharge:  Large right groin wound requiring frequent cares, wound vac, IV abx, nocturnal BIPAP    Active LTACH PROBLEMS  Acute on Chronic Hypoxic Respiratory Failure  VAP, PTA - resolved  COPD  ADEEL  - Completed zosyn 4/17  - inhalers as needed  - weaned off HFNC, weaned off NC during the day. Continues Bipap at night.  - RT following    Perineal Abscess  Condyloma Acuminata  s/p acyclovir and I&D x3  - zosyn through 4/17  - rectal tube discontinued  - noguera for wound protection  - VAC in place  - Continue wound care per [facility] nurse    PE, Acute, segmental, subsegmental  - On apixaban     Reactive thrombocytosis:  Platelets historically in normal range (reviewed last 3 years)  Rising over last 10 days to 700,000  Likely  reactive - reviewed flow chart for acute thrombocytosis, posted in note  Check peripheral smear, liver enzymes    T2DM  Poorly controlled with A1c 12.7. Used to be on insulin pump. No longer on it due to intellectual delay.  - BG labile earlier during admission, now stabilizing   - Increase Lantus 30 unit(s)->32 units qhs  - I:C of 1:5 -> I:C 1:4 with meals  - ISS high    CKD3b  - Creatinine stable. Monitor    Elevated Hgb in past  Historically high  Consistent with smoking (high CO), untreated ADEEL    History of intellectual delay: supportive care.          Diet: Snacks/Supplements Adult: Expedite Cup; With Meals  Snacks/Supplements Adult: Magic Cup; With  Meals  Snacks/Supplements Adult: Gelatein 20 (sugar-free); With Meals  Combination Diet Moderate Consistent Carb (60 g CHO per Meal) Diet; Easy to Chew (level 7); Thin Liquids (level 0)    Landrum Catheter: PRESENT, indication: Wound deterioration and failed external collection device  Lines: None     Cardiac Monitoring: None  Code Status: Full Code      Clinically Significant Risk Factors               # Hypoalbuminemia: Lowest albumin = 2.9 g/dL at 4/13/2025  2:14 AM, will monitor as appropriate     # Hypertension: Noted on problem list           # DMII: A1C = 12.7 % (Ref range: <5.7 %) within past 6 months       # COPD: noted on problem list        Social Drivers of Health    Tobacco Use: Medium Risk (4/2/2025)    Patient History     Smoking Tobacco Use: Former     Smokeless Tobacco Use: Never     Passive Exposure: Current   Physical Activity: Unknown (5/16/2024)    Exercise Vital Sign     Days of Exercise per Week: 0 days   Social Connections: Unknown (5/16/2024)    Social Connection and Isolation Panel [NHANES]     Frequency of Social Gatherings with Friends and Family: More than three times a week          Disposition Plan     Medically Ready for Discharge: Anticipated in 5+ Days             Andrez Mueller MD  Hospitalist Service  LTACH  Securely message with [employer] (more info)  Text page via [employer] Paging/Directory   ______________________________________________________________________    Interval History   Patient slept well, good appetite. Walking.    Physical Exam   Vital Signs: Temp: 98.2  F (36.8  C) Temp src: Axillary (wearing BiPAP) BP: 132/81 Pulse: 87   Resp: 19 SpO2: 95 % O2 Device: BiPAP/CPAP    Weight: 182 lbs 8 oz    Vitals and nursing note reviewed.   Constitutional:  Well developed, obese adult female, in no acute distress   HEENT:      Eyes: Vision grossly intact, Conjunctivae clear without bleeding and without jaundice.      Head: Normocephalic and atraumatic.      Nares: without obstruction,  bleeding or discharge.  Skin: Skin is warm and well perfused. Face with areas of acne, some infected. Also wide-spread seborrheic dermatitis  Pulmonary: Lungs clear to auscultation bilaterally  Cardiac: RRR, no clinically significant murmur, gallop, rub  GI: Soft, NT, BS+  Neurological: Face is symmetric. Speech fluent, clear, appropriate. Oriented to self, time and place. Moves all extremities equally, 5/5 strength bilaterally.     Medical Decision Making       25 MINUTES SPENT BY ME on the date of service doing chart review, history, exam, documentation & further activities per the note.      Data   ------------------------- PAST 24 HR DATA REVIEWED -----------------------------------------------    I have personally reviewed the following data over the past 24 hrs:    N/A  \   N/A   / N/A     N/A N/A N/A /  214 (H)   N/A N/A N/A \     Ferritin:  N/A % Retic:  N/A LDH:  N/A       Imaging results reviewed over the past 24 hrs:   No results found for this or any previous visit (from the past 24 hours).

## 2025-04-22 NOTE — TELEPHONE ENCOUNTER
Russell back from Ivette [facility] nurse at [facility].  Mentioned that she changed the wound vac dressing today and saw that the penrose drain was still in place but easily removed. Stated that that would be okay to remove at the next wound vac dressing change so that the patient did not need to make an extra trip into the clinic for removal. Patient will continue to see [facility] for weekly wound vac changes and penrose drain will be removed at next dressing change.    Gladys [facility] RN  [facility]  General Surgery  [address] [place] [id number]  Office:[phone]  Employed by [facility]

## 2025-04-22 NOTE — PROVIDER NOTIFICATION
04/22/25 0707 04/22/25 0800 04/22/25 0841   Tech Time   $Tech Time (10 minute increments) 2  --   --    Vital Signs   Temp  --  98.8  F (37.1  C)  --    Temp src  --  Oral  --    Resp 19 24  --    Pulse 87 92  --    Pulse Rate Source Monitor Monitor  --    BP  --  127/64  --    BP Location  --  Left arm  --    Oxygen Therapy   SpO2 95 % 92 %  --    O2 Device BiPAP/CPAP None (Room air)  --    FiO2 (%) 21 %  --   --    Humidifier Checked N/A  --   --    Assessment   Respiratory WDL breath sounds;cough  --  WDL   Rhythm/Pattern, Respiratory no shortness of breath reported  --   --    Expansion/Accessory Muscles/Retractions no use of accessory muscles  --   --    Airway Safety Measures  --   --   --    Breath Sounds   Breath Sounds All Fields  --   --    All Lung Fields Breath Sounds clear;diminished  --   --    Nebulizer Assessment & Treatment   $RT Use ONLY Delivery Method Nebulizer - Initial  --   --    Daily Review of Necessity (SVN) completed  --   --    Nebulizer Device In line  --   --    Pretreatment Heart Rate (beats/min) 87  --   --    Pretreatment Resp Rate (breaths/min) 19  --   --    Pretreatment O2 sats - (TCU only) 95  --   --    Pretreat Breath Sounds - Bilat - All Lobes clear;diminished  --   --    Breath Sounds Post-Respiratory Treatment   Posttreatment Heart Rate (beats/min) 88  --   --    Posttreatment Resp Rate (breaths/min) 24  --   --    Post treatment O2 Sats - (TCU only) 97  --   --    Posttreatment Assessment (SVN) breath sounds unchanged  --   --    Signs of Intolerance (SVN) none  --   --    Breath Sounds Posttreatment All Fields All Fields  --   --    Breath Sounds Posttreatment All Fields no change  --   --    Breath Sounds Posttreatment LLL clear;diminished  --   --       04/22/25 1423   Tech Time   $Tech Time (10 minute increments) 2   Vital Signs   Temp  --    Temp src  --    Resp 18   Pulse 96   Pulse Rate Source Monitor   BP  --    BP Location  --    Oxygen Therapy   SpO2 97 %   O2  Device None (Room air)   FiO2 (%) 21 %   Humidifier Checked  --    Assessment   Respiratory WDL  --    Rhythm/Pattern, Respiratory no shortness of breath reported   Expansion/Accessory Muscles/Retractions no use of accessory muscles;expansion symmetric   Airway Safety Measures all equipment/monitors on and audible   Breath Sounds   Breath Sounds All Fields   All Lung Fields Breath Sounds clear;diminished   Nebulizer Assessment & Treatment   $RT Use ONLY Delivery Method Nebulizer - Additional   Daily Review of Necessity (SVN) completed   Nebulizer Device Mouthpiece   Pretreatment Heart Rate (beats/min) 96   Pretreatment Resp Rate (breaths/min) 18   Pretreatment O2 sats - (TCU only) 97   Pretreat Breath Sounds - Bilat - All Lobes clear;diminished   Breath Sounds Post-Respiratory Treatment   Posttreatment Heart Rate (beats/min) 96   Posttreatment Resp Rate (breaths/min) 20   Post treatment O2 Sats - (TCU only) 96   Posttreatment Assessment (SVN) increased aeration   Signs of Intolerance (SVN) none   Breath Sounds Posttreatment All Fields All Fields   Breath Sounds Posttreatment All Fields no change   Breath Sounds Posttreatment LLL clear;diminished   Pt has some redness on the on the face. Cont with plan alternate between masks

## 2025-04-22 NOTE — PLAN OF CARE
Problem: Adult Inpatient Plan of Care  Goal: Plan of Care Review  Description: The Plan of Care Review/Shift note should be completed every shift.  The Outcome Evaluation is a brief statement about your assessment that the patient is improving, declining, or no change.  This information will be displayed automatically on your shiftnote.  Outcome: Progressing  Flowsheets (Taken 2025)  Plan of Care Reviewed With: patient  Goal: Absence of Hospital-Acquired Illness or Injury  Intervention: Identify and Manage Fall Risk  Recent Flowsheet Documentation  Taken 2025 by Shaista Graham RN  Safety Promotion/Fall Prevention: room door open  Intervention: Prevent Infection  Recent Flowsheet Documentation  Taken 2025 by Shaista Graham RN  Infection Prevention:   hand hygiene promoted   personal protective equipment utilized   rest/sleep promoted   Goal Outcome Evaluation:      Plan of Care Reviewed With: patient      Patient was alert and oriented x4. She denies pain or discomfort. After getting the report, wound VAC machine was off, the  battery was . Nurse turned on the vac, and reported to the charge nurse. Wound VAC continues; dressing intact, no leak output total 250, this shift was 50ml. VSS. BiPAP at night; tolerating well. Pt was calm and cooperative and slept through the night.     /81 (BP Location: Left arm)   Pulse 98   Temp 98.2  F (36.8  C) (Axillary)   Resp 17   Wt 82.6 kg (182 lb)   SpO2 96%   BMI 30.29 kg/m

## 2025-04-23 ENCOUNTER — APPOINTMENT (OUTPATIENT)
Dept: PHYSICAL THERAPY | Facility: CLINIC | Age: 60
DRG: 602 | End: 2025-04-23
Attending: INTERNAL MEDICINE
Payer: COMMERCIAL

## 2025-04-23 ENCOUNTER — APPOINTMENT (OUTPATIENT)
Dept: OCCUPATIONAL THERAPY | Facility: CLINIC | Age: 60
DRG: 602 | End: 2025-04-23
Attending: INTERNAL MEDICINE
Payer: COMMERCIAL

## 2025-04-23 LAB
ANION GAP SERPL CALCULATED.3IONS-SCNC: 6 MMOL/L (ref 7–15)
BASE EXCESS BLDA CALC-SCNC: 3.9 MMOL/L (ref -3–3)
BUN SERPL-MCNC: 39.3 MG/DL (ref 8–23)
CA-I BLD-MCNC: 5.2 MG/DL (ref 4.4–5.2)
CALCIUM SERPL-MCNC: 9.6 MG/DL (ref 8.8–10.4)
CHLORIDE SERPL-SCNC: 103 MMOL/L (ref 98–107)
CREAT SERPL-MCNC: 1.26 MG/DL (ref 0.51–0.95)
EGFRCR SERPLBLD CKD-EPI 2021: 48 ML/MIN/1.73M2
ERYTHROCYTE [DISTWIDTH] IN BLOOD BY AUTOMATED COUNT: 14.5 % (ref 10–15)
GLUCOSE BLD-MCNC: 156 MG/DL (ref 70–99)
GLUCOSE BLDC GLUCOMTR-MCNC: 116 MG/DL (ref 70–99)
GLUCOSE BLDC GLUCOMTR-MCNC: 128 MG/DL (ref 70–99)
GLUCOSE BLDC GLUCOMTR-MCNC: 128 MG/DL (ref 70–99)
GLUCOSE BLDC GLUCOMTR-MCNC: 195 MG/DL (ref 70–99)
GLUCOSE BLDC GLUCOMTR-MCNC: 77 MG/DL (ref 70–99)
GLUCOSE SERPL-MCNC: 196 MG/DL (ref 70–99)
HCO3 BLDA-SCNC: 28 MMOL/L (ref 21–28)
HCO3 SERPL-SCNC: 29 MMOL/L (ref 22–29)
HCT VFR BLD AUTO: 35.6 % (ref 35–47)
HGB BLD-MCNC: 11.4 G/DL (ref 11.7–15.7)
HGB BLD-MCNC: 11.8 G/DL (ref 11.7–15.7)
LACTATE BLD-SCNC: 1.6 MMOL/L (ref 0.7–2)
MAGNESIUM SERPL-MCNC: 1.7 MG/DL (ref 1.7–2.3)
MCH RBC QN AUTO: 30.4 PG (ref 26.5–33)
MCHC RBC AUTO-ENTMCNC: 32 G/DL (ref 31.5–36.5)
MCV RBC AUTO: 95 FL (ref 78–100)
OXYHGB MFR BLDA: 93 % (ref 92–100)
PATH REPORT.COMMENTS IMP SPEC: NORMAL
PATH REPORT.COMMENTS IMP SPEC: NORMAL
PATH REPORT.FINAL DX SPEC: NORMAL
PATH REPORT.MICROSCOPIC SPEC OTHER STN: NORMAL
PATH REPORT.MICROSCOPIC SPEC OTHER STN: NORMAL
PATH REPORT.RELEVANT HX SPEC: NORMAL
PCO2 BLDA: 37 MM HG (ref 35–45)
PH BLDA: 7.48 [PH] (ref 7.35–7.45)
PLATELET # BLD AUTO: 524 10E3/UL (ref 150–450)
PO2 BLDA: 66 MM HG (ref 80–105)
POTASSIUM BLD-SCNC: 3.7 MMOL/L (ref 3.4–5.3)
POTASSIUM SERPL-SCNC: 4.6 MMOL/L (ref 3.4–5.3)
RBC # BLD AUTO: 3.75 10E6/UL (ref 3.8–5.2)
SAO2 % BLDA: 94 % (ref 96–97)
SODIUM BLD-SCNC: 141 MMOL/L (ref 135–145)
SODIUM SERPL-SCNC: 138 MMOL/L (ref 135–145)
WBC # BLD AUTO: 10.8 10E3/UL (ref 4–11)

## 2025-04-23 PROCEDURE — 999N000157 HC STATISTIC RCP TIME EA 10 MIN

## 2025-04-23 PROCEDURE — 250N000009 HC RX 250: Performed by: STUDENT IN AN ORGANIZED HEALTH CARE EDUCATION/TRAINING PROGRAM

## 2025-04-23 PROCEDURE — 94660 CPAP INITIATION&MGMT: CPT

## 2025-04-23 PROCEDURE — 99232 SBSQ HOSP IP/OBS MODERATE 35: CPT | Performed by: INTERNAL MEDICINE

## 2025-04-23 PROCEDURE — 120N000017 HC R&B RESPIRATORY CARE

## 2025-04-23 PROCEDURE — 83735 ASSAY OF MAGNESIUM: CPT | Performed by: STUDENT IN AN ORGANIZED HEALTH CARE EDUCATION/TRAINING PROGRAM

## 2025-04-23 PROCEDURE — 99222 1ST HOSP IP/OBS MODERATE 55: CPT | Mod: AI | Performed by: NURSE PRACTITIONER

## 2025-04-23 PROCEDURE — 94640 AIRWAY INHALATION TREATMENT: CPT | Mod: 76

## 2025-04-23 PROCEDURE — 82435 ASSAY OF BLOOD CHLORIDE: CPT | Performed by: STUDENT IN AN ORGANIZED HEALTH CARE EDUCATION/TRAINING PROGRAM

## 2025-04-23 PROCEDURE — 250N000009 HC RX 250: Performed by: INTERNAL MEDICINE

## 2025-04-23 PROCEDURE — 97530 THERAPEUTIC ACTIVITIES: CPT | Mod: GP

## 2025-04-23 PROCEDURE — 272N000202 HC AEROBIKA WITH MANOMETER

## 2025-04-23 PROCEDURE — 36600 WITHDRAWAL OF ARTERIAL BLOOD: CPT

## 2025-04-23 PROCEDURE — 97110 THERAPEUTIC EXERCISES: CPT | Mod: GO | Performed by: OCCUPATIONAL THERAPIST

## 2025-04-23 PROCEDURE — 82947 ASSAY GLUCOSE BLOOD QUANT: CPT

## 2025-04-23 PROCEDURE — 36415 COLL VENOUS BLD VENIPUNCTURE: CPT | Performed by: STUDENT IN AN ORGANIZED HEALTH CARE EDUCATION/TRAINING PROGRAM

## 2025-04-23 PROCEDURE — 97535 SELF CARE MNGMENT TRAINING: CPT | Mod: GO | Performed by: OCCUPATIONAL THERAPIST

## 2025-04-23 PROCEDURE — 250N000013 HC RX MED GY IP 250 OP 250 PS 637: Performed by: STUDENT IN AN ORGANIZED HEALTH CARE EDUCATION/TRAINING PROGRAM

## 2025-04-23 PROCEDURE — 94799 UNLISTED PULMONARY SVC/PX: CPT

## 2025-04-23 PROCEDURE — 94640 AIRWAY INHALATION TREATMENT: CPT

## 2025-04-23 PROCEDURE — 97116 GAIT TRAINING THERAPY: CPT | Mod: GP

## 2025-04-23 PROCEDURE — 250N000013 HC RX MED GY IP 250 OP 250 PS 637: Performed by: INTERNAL MEDICINE

## 2025-04-23 PROCEDURE — 85060 BLOOD SMEAR INTERPRETATION: CPT | Performed by: PATHOLOGY

## 2025-04-23 RX ADMIN — ANORECTAL OINTMENT: 15.7; .44; 24; 20.6 OINTMENT TOPICAL at 08:35

## 2025-04-23 RX ADMIN — ATORVASTATIN CALCIUM 40 MG: 40 TABLET, FILM COATED ORAL at 20:24

## 2025-04-23 RX ADMIN — BACITRACIN ZINC: 500 OINTMENT TOPICAL at 20:24

## 2025-04-23 RX ADMIN — MICONAZOLE NITRATE: 2 CREAM TOPICAL at 20:26

## 2025-04-23 RX ADMIN — AMLODIPINE BESYLATE 5 MG: 5 TABLET ORAL at 08:34

## 2025-04-23 RX ADMIN — AMOXICILLIN 500 MG: 500 CAPSULE ORAL at 06:14

## 2025-04-23 RX ADMIN — APIXABAN 5 MG: 5 TABLET, FILM COATED ORAL at 08:34

## 2025-04-23 RX ADMIN — SODIUM CHLORIDE SOLN NEBU 3% 3 ML: 3 NEBU SOLN at 07:35

## 2025-04-23 RX ADMIN — APIXABAN 5 MG: 5 TABLET, FILM COATED ORAL at 20:24

## 2025-04-23 RX ADMIN — ANORECTAL OINTMENT: 15.7; .44; 24; 20.6 OINTMENT TOPICAL at 13:56

## 2025-04-23 RX ADMIN — ALBUTEROL SULFATE 2.5 MG: 2.5 SOLUTION RESPIRATORY (INHALATION) at 13:15

## 2025-04-23 RX ADMIN — BACITRACIN ZINC: 500 OINTMENT TOPICAL at 08:34

## 2025-04-23 RX ADMIN — MICONAZOLE NITRATE: 2 CREAM TOPICAL at 08:35

## 2025-04-23 RX ADMIN — INSULIN GLARGINE 32 UNITS: 100 INJECTION, SOLUTION SUBCUTANEOUS at 20:25

## 2025-04-23 RX ADMIN — UMECLIDINIUM BROMIDE AND VILANTEROL TRIFENATATE 1 PUFF: 62.5; 25 POWDER RESPIRATORY (INHALATION) at 08:34

## 2025-04-23 RX ADMIN — INSULIN ASPART 27 UNITS: 100 INJECTION, SOLUTION INTRAVENOUS; SUBCUTANEOUS at 13:12

## 2025-04-23 RX ADMIN — AMOXICILLIN 500 MG: 500 CAPSULE ORAL at 22:19

## 2025-04-23 RX ADMIN — ANORECTAL OINTMENT: 15.7; .44; 24; 20.6 OINTMENT TOPICAL at 20:26

## 2025-04-23 RX ADMIN — INSULIN ASPART 17 UNITS: 100 INJECTION, SOLUTION INTRAVENOUS; SUBCUTANEOUS at 08:38

## 2025-04-23 RX ADMIN — INSULIN ASPART 15 UNITS: 100 INJECTION, SOLUTION INTRAVENOUS; SUBCUTANEOUS at 17:56

## 2025-04-23 RX ADMIN — AMOXICILLIN 500 MG: 500 CAPSULE ORAL at 13:56

## 2025-04-23 RX ADMIN — INSULIN ASPART 3 UNITS: 100 INJECTION, SOLUTION INTRAVENOUS; SUBCUTANEOUS at 08:34

## 2025-04-23 RX ADMIN — ALBUTEROL SULFATE 2.5 MG: 2.5 SOLUTION RESPIRATORY (INHALATION) at 07:35

## 2025-04-23 ASSESSMENT — ACTIVITIES OF DAILY LIVING (ADL)
ADLS_ACUITY_SCORE: 65
ADLS_ACUITY_SCORE: 63
ADLS_ACUITY_SCORE: 65
ADLS_ACUITY_SCORE: 63
ADLS_ACUITY_SCORE: 65
ADLS_ACUITY_SCORE: 63
ADLS_ACUITY_SCORE: 64
ADLS_ACUITY_SCORE: 65
ADLS_ACUITY_SCORE: 63
ADLS_ACUITY_SCORE: 63
ADLS_ACUITY_SCORE: 65
ADLS_ACUITY_SCORE: 63
ADLS_ACUITY_SCORE: 63
ADLS_ACUITY_SCORE: 65
ADLS_ACUITY_SCORE: 65
ADLS_ACUITY_SCORE: 64
ADLS_ACUITY_SCORE: 64
ADLS_ACUITY_SCORE: 65
ADLS_ACUITY_SCORE: 63
ADLS_ACUITY_SCORE: 64
ADLS_ACUITY_SCORE: 65
ADLS_ACUITY_SCORE: 63
ADLS_ACUITY_SCORE: 65

## 2025-04-23 ASSESSMENT — VISUAL ACUITY: OU: NOT TESTED

## 2025-04-23 NOTE — PROVIDER NOTIFICATION
04/23/25 0315   Mode: CPAP/ BiPAP/ AVAPS/ AVAPS AE   CPAP/BiPAP/ AVAPS/ AVAPS AE Mode BiPAP S/T   Equipment   Device V60   Device Serial Number [id number]   CPAP/BiPAP/Settings   $CPAP/BiPAP Subsequent completed   BIPAP/CPAP On Standby On   IPAP/EPAP (cmH2O) 16/8   Rate (breaths/min) 16   Oxygen (%) 21   Timed Inspiration (sec) 0.9   IPAP RISE  Settings (V60) 2

## 2025-04-23 NOTE — PLAN OF CARE
Problem: Pain Acute  Goal: Optimal Pain Control and Function  Outcome: Progressing     Problem: Adult Inpatient Plan of Care  Goal: Optimal Comfort and Wellbeing  Outcome: Progressing     Pt is alert and oriented x 4.  Right labia wound care completed this shift.  Wound vac leaking this shift.  Leak patched and currently maintaining seal.  Pt up in the chair x 2 this shift.  Pt reports having right groin pain while in bed but denied having any pain while sitting in chair.  No PRN medications given this shift.  Pt ate 100% of breakfast and lunch.  Blood sugar 195 & 116.

## 2025-04-23 NOTE — PROGRESS NOTES
Pt is currently on RA during the day and goes on BiPAP @ night but the BiPAP is on hold for tonight for overnight oximetry study and ABG's in the morning for home BiPAP qualification. The first ABG's was done this shift to compare. Results below:  Recent Labs   Lab 04/23/25  1503   PH 7.48*   PCO2 37   PO2 66*   HCO3 28    Pt did IS with good effort with 2500 ml. All scheduled TXs was done with no issues. Sat 91-96%, HR , RR 16-18, BS clear/diminished. RT will cont to monitor closely.

## 2025-04-23 NOTE — PLAN OF CARE
Problem: Wound  Goal: Optimal Coping  Outcome: Progressing  Intervention: Support Patient and Family Response  Recent Flowsheet Documentation  Taken 4/23/2025 1800 by Linda Mccormack RN  Supportive Measures: active listening utilized  Family/Support System Care:   support provided   presence promoted     Problem: Adult Inpatient Plan of Care  Goal: Optimal Comfort and Wellbeing  Outcome: Progressing     Problem: Pain Acute  Goal: Optimal Pain Control and Function  Intervention: Optimize Psychosocial Wellbeing  Recent Flowsheet Documentation  Taken 4/23/2025 1800 by Linad Mccormack RN  Supportive Measures: active listening utilized  Diversional Activities: television  Intervention: Prevent or Manage Pain  Recent Flowsheet Documentation  Taken 4/23/2025 1800 by Linda Mccormack RN  Sensory Stimulation Regulation:   lighting decreased   quiet environment promoted   television on  Bowel Elimination Promotion: adequate fluid intake promoted  Medication Review/Management: medications reviewed   Goal Outcome Evaluation:  Pt calm, co-operative and pleasantly confused. Redirection and orientation used and helpful. Other cares and needs attended to accordingly. VSS, denied any pain.

## 2025-04-23 NOTE — CONSULTS
Consultation - Pulmonary Medicine  Marly Cannon,  1965, MRN [id number]    Acute respiratory failure (H) [J96.00]   Code status:  Full Code       Extended Emergency Contact Information  Primary Emergency Contact: Mary Reynoso  Mobile Phone: [phone]  Relation: Sister  Secondary Emergency Contact: SOLITARIO MARTINEZ  Address: [address]           [place], [place] [id number] [place] [address]  Home Phone: [phone]  Mobile Phone: [phone]  Relation: Significant other       Impressions:     Problems:  Acute on chronic hypoxic and hypercapnic respiratory failure  PE on apixaban  COPD: 40+ pack year smoker with severe obstruction & possible fixed upper airway obstruction on spirometry: on Stiolto(LAMA/LABA)  ADEEL, Sleep hypoxemia and hypoventilation: sleep titration study 2023 required BiPAP   Recent pneumonia completed antibiotics   intellectual disability & memory issues suspect main reason leading to piror BiPAP compliance     Since admission to [facility] on , patient has been weaned off supplemental O2.  Since , she has been on RA during the day, and using BiPAP() with a backup rate of 16 with FiO2 21% at night. She is currently on albuterol + HTS nebs + Anoro Ellipta inhaler. Reviewed most recent imaging & lungs largely clear bilaterally w mild bibasilar atelectasis on CXR from . Per chart review she's been compliant with nocturnal BiPAP since admission.        Recommendations and Plans:   Continued nocturnal BiPAP(holding tonight for qualification testing) use recommended given her underlying ADEEL, hypoventilation, and COPD. Once she is discharged anticipate compliance to be an ongoing issue with underlying intellectual disability  Stop scheduled nebs, keep albuterol neb PRN and continue Anoro Ellipta inhaler  IS and FV for pulmonary hygiene    BiPAP qualification: will try either under hypoventilation or COPD pathway   - Awake ABG today on normal FiO2 for updated baseline  - Hold  BiPAP tonight and repeat ABG in am.  May continue BiPAP the following night    - Overnight oximetry tonight - start on room air   - Bedside spirometry ordered for tomorrow             Chief Complaint Respiratory failure        HPI   I have been asked by Dr. Mueller to see Marly Cannon in consultation for severe ADEEL, non-compliance of CPAP/BIPAP and assess to help maximize therapy and compliance   Marly Cannon is a 59 year old year old female admitted to [facility] on 4/11/2025 for ongoing treatment of respiratory failure.    The referring facility is [facility].  Records from that facility are available to assist in historical details. No family present.    Marly Cannon is a 59 year old woman with history of intellectual disability & memory issues, COPD, untreated ADEEL, tobacco use d/o, HTN, pAFib, & poorly controlled T2DM s/p previous hospitalization for DKA (3/18 - 3/20).     Presented second time to hospital in [place] on 3/22/2025 with complaints of dyspnea, hyperglycemia, and chest pain. She was found to have an YADIRA, LLL PE, DKA, and A-fib with RVR. She was started on an insulin drip, therapeutic dose enoxaparin, Zosyn for possible pneumonia, and diltiazem drip for management of her RVR. Her DKA resolved, she was switched from diltiazem to amiodarone drip for management of A-fib with RVR; however, her degree of respiratory failure was still concerning and she was intubated 3/23 for management of worsening hypoxia.   She had a repeat CTA that demonstrated progression of her PE and RLL collapse concerning for mucous plugging and pneumonia.   She was switched from enoxaparin to heparin drip and her antibiotics were broadened to vancomycin and Zosyn. She had a sputum culture grow Candida & has been on fluconazole since 3/25. Transferred to [facility] d/t concerns that she might require a tracheostomy secondary to her inability to liberate from the vent. Successfully extubated  4/1. Infectious disease consulted and continued patient on Zosyn course to be completed through 4/17/2025. General surgery consulted for right keiry/perineal abscess and performed incision and drainage with Penrose drain 3/31 with subsequent repeat I&D 4/2 and 4/8.      LTACH Course  She transferred from [facility] to [place] 4/11 for wound cares, therapies, and complex medical care.   4/12-4/14: Needing BiPAP overnight.  4/17: successfully weaned off NC O2 during the day. Completed course of Zosyn for pneumonia     Today she is sitting up in chair eating lunch.  On RA.  She is a poor historian.  Tells me she did use BiPAP in the past but stopped using.  She could not tell me why or when she stopped using. Tolerating BiPAP at night currently and would continue using if prescribed again. Denies dyspnea, pain, NV, fever, chills.  Not coughing up secretions. Feels the nebs aren't necessary.        Medical History  [facility]  @[facility]@ Social History  Reviewed, and she  reports that she has quit smoking. Her smoking use included cigarettes. She started smoking about 46 years ago. She has a 46.3 pack-year smoking history. She has been exposed to tobacco smoke. She has never used smokeless tobacco. She reports that she does not drink alcohol and does not use drugs.    Smoking history:   History   Smoking Status    Former    Types: Cigarettes   Smokeless Tobacco    Never    Family History  Reviewed, and family history includes Diabetes in her father, mother, and sister; Hypertension in her brother.   Allergies  No Known Allergies           Current Medications:   Current Facility-Administered Medications   Medication Dose Route Frequency Provider Last Rate Last Admin    albuterol (PROVENTIL) neb solution 2.5 mg  2.5 mg Nebulization 3 times daily Tomas Benitez MD   2.5 mg at 04/23/25 0735    And    sodium chloride (NEBUSAL) 3 % neb solution 3 mL  3 mL Nebulization BID Tomas Benitez MD   3 mL at 04/23/25 0782     amLODIPine (NORVASC) tablet 5 mg  5 mg Oral Daily Tomas Benitez MD   5 mg at 04/23/25 0834    amoxicillin (AMOXIL) capsule 500 mg  500 mg Oral Q8H [facility] Andrez Mueller MD   500 mg at 04/23/25 0614    apixaban ANTICOAGULANT (ELIQUIS) tablet 5 mg  5 mg Oral BID Tomas Benitez MD   5 mg at 04/23/25 0834    atorvastatin (LIPITOR) tablet 40 mg  40 mg Oral At Bedtime Tomas Benitez MD   40 mg at 04/22/25 2130    bacitracin ointment   Topical BID Andrez Mueller MD   Given at 04/23/25 0834    insulin aspart (NovoLOG) injection (RAPID ACTING)   Subcutaneous TID w/meals Andrez Mueller MD   17 Units at 04/23/25 0838    insulin aspart (NovoLOG) injection (RAPID ACTING)  1-10 Units Subcutaneous TID AC Tomas Benitez MD   3 Units at 04/23/25 0834    insulin aspart (NovoLOG) injection (RAPID ACTING)  1-7 Units Subcutaneous At Bedtime Tomas Benitez MD   1 Units at 04/20/25 2107    insulin glargine (LANTUS PEN) injection 32 Units  32 Units Subcutaneous At Bedtime Andrez Mueller MD   32 Units at 04/22/25 2131    menthol-zinc oxide (CALMOSEPTINE) 0.44-20.6 % ointment OINT   Topical TID Yolis Olvera MD   Given at 04/23/25 0835    miconazole (MICATIN) 2 % cream   Topical BID Andrez Mueller MD   Given at 04/23/25 0835    sodium chloride (PF) 0.9% PF flush 3 mL  3 mL Intracatheter Q8H Tomas Daniels MD   3 mL at 04/23/25 0614    umeclidinium-vilanterol (ANORO ELLIPTA) 62.5-25 MCG/ACT oral inhaler 1 puff  1 puff Inhalation Daily Tomas Benitez MD   1 puff at 04/23/25 0834          Review of Systems:  A 10-system review was obtained and is negative with the exception of the symptoms noted above. Physical Exam:  Temp:  [98  F (36.7  C)-98.6  F (37  C)] 98.6  F (37  C)  Pulse:  [86-99] 86  Resp:  [16-19] 18  BP: (124-134)/(63-77) 133/73  FiO2 (%):  [21 %] 21 %  SpO2:  [94 %-97 %] 94 %  [unfilled]  Ventilator settings: FiO2 (%): 21 %, Resp: 18    EXAM:  Physical Exam  Gen: No acute distress on in chair on room air   HEENT:  NT  CV: RRR, no m/g/r  Resp: CTAB; non-labored   Abd: soft, nontender, BS+  Skin: no rashes or lesions  Ext: no edema  Neuro: PERRL, nonfocal exam, forgetful        Pertinent Labs:  Lab Results: personally reviewed.   Recent Labs   Lab 04/23/25  0623   WBC 10.8   HGB 11.4*   HCT 35.6   *     Recent Labs   Lab 04/23/25  0623 04/21/25  0636 04/18/25  0621    140 139   CO2 29 28 28   BUN 39.3* 35.4* 40.7*   ALT  --  13  --    AST  --  18  --         Pertinent Radiology:  Radiology results: images and reports personally viewed; radiology read below    XR CHEST PORT 1 VIEW  LOCATION: [facility]  DATE: 4/9/2025     INDICATION: VAP follow up  COMPARISON: 4/5/2025 radiograph. 4/7/2025 CT.                                                                      IMPRESSION: A right IJ catheter terminates over the right upper mediastinum. No pleural fluid appreciated on this study. No pneumothorax. No definite airspace disease. Normal size of the heart.     Other pertinent data:  PFT 1/9/2024-severe obstruction with severe air trapping and a normal DLCO; flow-volume loop suggestive of a fixed upper airway obstruction              Extensive record review is performed.  Key information about patient is reviewed in detail.  The respiratory plan of care is discussed with RT.  This patient will be rounded on regularly while requiring mechanical ventilator support.  Please contact us with questions or concerns.    Total time spent on pt examination and coordination of care was 60min   Bonilla Miller CNP  Pulmonary Medicine  [facility]  Pager [phone]  Office: [phone]

## 2025-04-23 NOTE — PROGRESS NOTES
[facility]    Medicine Progress Note - Hospitalist Service    Date of Admission:  4/11/2025    Assessment & Plan   PMHx: Marly Cannon is a 59 year old woman with history of intellectual disability & memory issues, COPD, untreated ADEEL, tobacco use d/o, HTN, pAFib, & poorly controlled T2DM s/p previous hospitalization for DKA (3/18 - 3/20).  CC: Acute onset dyspnea  Acute Care Hospital Course  HPI: Presented second time to hospital in [place] on 3/22/2025 with complaints of dyspnea, hyperglycemia, and chest pain. She was found to have an YADIRA, LLL PE, DKA, and A-fib with RVR. She was started on an insulin drip, therapeutic dose enoxaparin, Zosyn for possible pneumonia, and diltiazem drip for management of her RVR. Her DKA resolved, she was switched from diltiazem to amiodarone drip for management of A-fib with RVR; however, her degree of respiratory failure was still concerning and she was intubated 3/23 for management of worsening hypoxia.   She had a repeat CTA that demonstrated progression of her PE and RLL collapse concerning for mucous plugging and pneumonia.   She was switched from enoxaparin to heparin drip and her antibiotics were broadened to vancomycin and Zosyn. She was on norepinephrine for several days d/t hypotension attributed to sedation & sepsis-related vasoplegia. It appears that she has been doing fairly long SBTs since 3/26, which seemed to be going reasonably well per RT documentation. Primary team has been having issues w/ progressively increasing FiO2 needs. She had a sputum culture grow Candida & has been on fluconazole since 3/25. Transferred to [facility] d/t concerns that she might require a tracheostomy secondary to her inability to liberate from the vent.   At [facility] patient was admitted to the ICU on a ventilator, successfully extubated 4/1 and downgraded to floor status 4/3.  Infectious disease consulted and continued patient on Zosyn course to be completed through  4/17/2025.  General surgery consulted for right keiry/perineal abscess and performed incision and drainage with Penrose drain 3/31 with subsequent repeat I&D 4/2 and 4/8.  Surgery requests wound VAC be changed by wound care Tuesdays and Thursdays.     LTACH Course  She transferred from [facility] to [place] for wound cares, therapies, and complex medical care.   4/12-4/14: Needing BiPAP overnight.  4/17: successfully weaned off NC O2 during the day  4/23- wound is closing well    DVT Prophylaxis: Apixaban 5 mg po bid    Barriers to Discharge:  Large right groin wound requiring frequent cares, wound vac, IV abx, nocturnal BIPAP    Active LTACH PROBLEMS  Acute on Chronic Hypoxic Respiratory Failure  VAP, PTA - resolved  COPD  ADEEL  - Completed zosyn 4/17  - inhalers as needed  - weaned off HFNC, weaned off NC during the day. Continues Bipap at night.  - RT following    Perineal Abscess  Condyloma Acuminata  s/p acyclovir and I&D x3  - zosyn through 4/17  - rectal tube discontinued  - noguera for wound protection  - VAC in place  - Continue wound care per [facility] nurse    PE, Acute, segmental, subsegmental  - On apixaban     Reactive thrombocytosis:  Platelets historically in normal range (reviewed last 3 years)  Rising over last 10 days to 700,000  Likely  reactive - reviewed flow chart for acute thrombocytosis, posted in note  Normal peripheral smear, liver enzymes    T2DM  Poorly controlled with A1c 12.7. Used to be on insulin pump. No longer on it due to intellectual delay.  - BG labile earlier during admission, now stabilizing   - Increase Lantus 30 unit(s)->32 units qhs  - I:C of 1:5 -> I:C 1:4 with meals  - ISS high    CKD3b  - Creatinine stable. Monitor    Elevated Hgb in past  Historically high  Consistent with smoking (high CO), untreated ADEEL    History of intellectual delay: supportive care.          Diet: Snacks/Supplements Adult: Expedite Cup; With Meals  Snacks/Supplements Adult: Magic Cup; With  Meals  Snacks/Supplements Adult: Gelatein 20 (sugar-free); With Meals  Combination Diet Moderate Consistent Carb (60 g CHO per Meal) Diet; Easy to Chew (level 7); Thin Liquids (level 0)    Landrum Catheter: PRESENT, indication: Wound deterioration and failed external collection device  Lines: None     Cardiac Monitoring: None  Code Status: Full Code      Clinically Significant Risk Factors               # Hypoalbuminemia: Lowest albumin = 2.9 g/dL at 4/13/2025  2:14 AM, will monitor as appropriate     # Hypertension: Noted on problem list           # DMII: A1C = 12.7 % (Ref range: <5.7 %) within past 6 months       # COPD: noted on problem list        Social Drivers of Health    Tobacco Use: Medium Risk (4/2/2025)    Patient History     Smoking Tobacco Use: Former     Smokeless Tobacco Use: Never     Passive Exposure: Current   Physical Activity: Unknown (5/16/2024)    Exercise Vital Sign     Days of Exercise per Week: 0 days   Social Connections: Unknown (5/16/2024)    Social Connection and Isolation Panel [NHANES]     Frequency of Social Gatherings with Friends and Family: More than three times a week          Disposition Plan     Medically Ready for Discharge: Anticipated in 5+ Days             Andrez Mueller MD  Hospitalist Service  LTACH  Securely message with [employer] (more info)  Text page via [employer] Paging/Directory   ______________________________________________________________________    Interval History   Patient feeling well. Questions about discharge plan.    Physical Exam   Vital Signs: Temp: 98  F (36.7  C) Temp src: Axillary BP: 134/77 Pulse: 89   Resp: 16 SpO2: 94 % O2 Device: None (Room air)    Weight: 184 lbs 1.6 oz    Vitals and nursing note reviewed.   Constitutional:  Well developed, obese adult female, in no acute distress   HEENT:      Eyes: Vision grossly intact, Conjunctivae clear without bleeding and without jaundice.      Head: Normocephalic and atraumatic.      Nares: without obstruction,  bleeding or discharge.  Skin: Skin is warm and well perfused. Face with areas of acne, some infected. Also wide-spread seborrheic dermatitis  Pulmonary: Lungs clear to auscultation bilaterally  Cardiac: RRR, no clinically significant murmur, gallop, rub  GI: Soft, NT, BS+  Neurological: Face is symmetric. Speech fluent, clear, appropriate. Oriented to self, time and place. Moves all extremities equally, 5/5 strength bilaterally.     Medical Decision Making       35 MINUTES SPENT BY ME on the date of service doing chart review, history, exam, documentation & further activities per the note.      Data   ------------------------- PAST 24 HR DATA REVIEWED -----------------------------------------------    I have personally reviewed the following data over the past 24 hrs:    10.8  \   11.4 (L)   / 524 (H)     138 103 39.3 (H) /  195 (H)   4.6 29 1.26 (H) \       Imaging results reviewed over the past 24 hrs:   No results found for this or any previous visit (from the past 24 hours).

## 2025-04-23 NOTE — PLAN OF CARE
Problem: Adult Inpatient Plan of Care  Goal: Optimal Comfort and Wellbeing  Outcome: Progressing     Problem: Wound  Goal: Skin Health and Integrity  Outcome: Progressing  Goal: Optimal Wound Healing  Outcome: Progressing   Goal Outcome Evaluation:         Pt is alert and oriented x 3, slow to respond, noted redness around the face, Micatin cream applied. Pt denies pain, calm and cooperative with cares, slept most of the shift with Bipap on. Right groin wound Vac leak a couple of times, re enforced and was intact and patent. No BM overnight, noguera cath patent and draining well. Vitals is stable. Continue monitoring. /77 (BP Location: Left arm)   Pulse 91   Temp 98  F (36.7  C) (Axillary)   Resp 17   Wt 82.8 kg (182 lb 8 oz)   SpO2 95%   BMI 30.37 kg/m

## 2025-04-24 ENCOUNTER — APPOINTMENT (OUTPATIENT)
Dept: PHYSICAL THERAPY | Facility: CLINIC | Age: 60
DRG: 602 | End: 2025-04-24
Attending: INTERNAL MEDICINE
Payer: COMMERCIAL

## 2025-04-24 ENCOUNTER — APPOINTMENT (OUTPATIENT)
Dept: OCCUPATIONAL THERAPY | Facility: CLINIC | Age: 60
End: 2025-04-24
Attending: INTERNAL MEDICINE
Payer: COMMERCIAL

## 2025-04-24 LAB
BACTERIA BRONCH: NORMAL
BASE EXCESS BLDA CALC-SCNC: 3.1 MMOL/L (ref -3–3)
CA-I BLD-MCNC: 5.2 MG/DL (ref 4.4–5.2)
GLUCOSE BLD-MCNC: 190 MG/DL (ref 70–99)
GLUCOSE BLDC GLUCOMTR-MCNC: 121 MG/DL (ref 70–99)
GLUCOSE BLDC GLUCOMTR-MCNC: 145 MG/DL (ref 70–99)
GLUCOSE BLDC GLUCOMTR-MCNC: 166 MG/DL (ref 70–99)
GLUCOSE BLDC GLUCOMTR-MCNC: 204 MG/DL (ref 70–99)
HCO3 BLDA-SCNC: 27 MMOL/L (ref 21–28)
HGB BLD-MCNC: 11.3 G/DL (ref 11.7–15.7)
LACTATE BLD-SCNC: 0.9 MMOL/L (ref 0.7–2)
OXYHGB MFR BLDA: 93 % (ref 92–100)
PCO2 BLDA: 39 MM HG (ref 35–45)
PH BLDA: 7.45 [PH] (ref 7.35–7.45)
PO2 BLDA: 68 MM HG (ref 80–105)
POTASSIUM BLD-SCNC: 4.3 MMOL/L (ref 3.4–5.3)
SAO2 % BLDA: 95 % (ref 96–97)
SODIUM BLD-SCNC: 137 MMOL/L (ref 135–145)

## 2025-04-24 PROCEDURE — 97535 SELF CARE MNGMENT TRAINING: CPT | Mod: GO | Performed by: OCCUPATIONAL THERAPIST

## 2025-04-24 PROCEDURE — 250N000013 HC RX MED GY IP 250 OP 250 PS 637: Performed by: INTERNAL MEDICINE

## 2025-04-24 PROCEDURE — 82805 BLOOD GASES W/O2 SATURATION: CPT

## 2025-04-24 PROCEDURE — 99232 SBSQ HOSP IP/OBS MODERATE 35: CPT | Performed by: NURSE PRACTITIONER

## 2025-04-24 PROCEDURE — 120N000017 HC R&B RESPIRATORY CARE

## 2025-04-24 PROCEDURE — 94799 UNLISTED PULMONARY SVC/PX: CPT

## 2025-04-24 PROCEDURE — 99231 SBSQ HOSP IP/OBS SF/LOW 25: CPT | Performed by: INTERNAL MEDICINE

## 2025-04-24 PROCEDURE — 999N000157 HC STATISTIC RCP TIME EA 10 MIN

## 2025-04-24 PROCEDURE — 250N000013 HC RX MED GY IP 250 OP 250 PS 637: Performed by: STUDENT IN AN ORGANIZED HEALTH CARE EDUCATION/TRAINING PROGRAM

## 2025-04-24 PROCEDURE — 94660 CPAP INITIATION&MGMT: CPT

## 2025-04-24 PROCEDURE — 250N000009 HC RX 250: Performed by: INTERNAL MEDICINE

## 2025-04-24 PROCEDURE — 36600 WITHDRAWAL OF ARTERIAL BLOOD: CPT

## 2025-04-24 PROCEDURE — 94060 EVALUATION OF WHEEZING: CPT

## 2025-04-24 PROCEDURE — 97116 GAIT TRAINING THERAPY: CPT | Mod: GP

## 2025-04-24 RX ADMIN — BACITRACIN ZINC: 500 OINTMENT TOPICAL at 09:06

## 2025-04-24 RX ADMIN — ANORECTAL OINTMENT: 15.7; .44; 24; 20.6 OINTMENT TOPICAL at 09:13

## 2025-04-24 RX ADMIN — INSULIN ASPART 17 UNITS: 100 INJECTION, SOLUTION INTRAVENOUS; SUBCUTANEOUS at 13:02

## 2025-04-24 RX ADMIN — INSULIN ASPART 16 UNITS: 100 INJECTION, SOLUTION INTRAVENOUS; SUBCUTANEOUS at 09:12

## 2025-04-24 RX ADMIN — ATORVASTATIN CALCIUM 40 MG: 40 TABLET, FILM COATED ORAL at 21:15

## 2025-04-24 RX ADMIN — APIXABAN 5 MG: 5 TABLET, FILM COATED ORAL at 21:16

## 2025-04-24 RX ADMIN — AMOXICILLIN 500 MG: 500 CAPSULE ORAL at 05:50

## 2025-04-24 RX ADMIN — ANORECTAL OINTMENT: 15.7; .44; 24; 20.6 OINTMENT TOPICAL at 13:00

## 2025-04-24 RX ADMIN — INSULIN GLARGINE 32 UNITS: 100 INJECTION, SOLUTION SUBCUTANEOUS at 21:26

## 2025-04-24 RX ADMIN — MICONAZOLE NITRATE: 2 CREAM TOPICAL at 21:18

## 2025-04-24 RX ADMIN — AMOXICILLIN 500 MG: 500 CAPSULE ORAL at 13:15

## 2025-04-24 RX ADMIN — AMOXICILLIN 500 MG: 500 CAPSULE ORAL at 21:16

## 2025-04-24 RX ADMIN — INSULIN ASPART 3 UNITS: 100 INJECTION, SOLUTION INTRAVENOUS; SUBCUTANEOUS at 09:05

## 2025-04-24 RX ADMIN — INSULIN ASPART 1 UNITS: 100 INJECTION, SOLUTION INTRAVENOUS; SUBCUTANEOUS at 17:36

## 2025-04-24 RX ADMIN — ANORECTAL OINTMENT: 15.7; .44; 24; 20.6 OINTMENT TOPICAL at 21:17

## 2025-04-24 RX ADMIN — BACITRACIN ZINC: 500 OINTMENT TOPICAL at 21:16

## 2025-04-24 RX ADMIN — INSULIN ASPART 2 UNITS: 100 INJECTION, SOLUTION INTRAVENOUS; SUBCUTANEOUS at 12:24

## 2025-04-24 RX ADMIN — INSULIN ASPART 13 UNITS: 100 INJECTION, SOLUTION INTRAVENOUS; SUBCUTANEOUS at 17:51

## 2025-04-24 RX ADMIN — AMLODIPINE BESYLATE 5 MG: 5 TABLET ORAL at 09:06

## 2025-04-24 RX ADMIN — MICONAZOLE NITRATE: 2 CREAM TOPICAL at 09:13

## 2025-04-24 RX ADMIN — UMECLIDINIUM BROMIDE AND VILANTEROL TRIFENATATE 1 PUFF: 62.5; 25 POWDER RESPIRATORY (INHALATION) at 11:01

## 2025-04-24 RX ADMIN — APIXABAN 5 MG: 5 TABLET, FILM COATED ORAL at 09:06

## 2025-04-24 ASSESSMENT — ACTIVITIES OF DAILY LIVING (ADL)
ADLS_ACUITY_SCORE: 62
ADLS_ACUITY_SCORE: 63
ADLS_ACUITY_SCORE: 62
ADLS_ACUITY_SCORE: 63
ADLS_ACUITY_SCORE: 62
ADLS_ACUITY_SCORE: 62

## 2025-04-24 NOTE — PROGRESS NOTES
Overnight oximetry done , started on room , patient,s SPO2 went down to 82%, placed oxygen started on 1L, and increased to 2L, . ABG drawn , result show, 7.45/39/68/27, 95%

## 2025-04-24 NOTE — PROGRESS NOTES
Pulmonary Progress Note    Admit Date: 4/11/2025  CODE: Full Code    Assessment:   Marly Cannon is a 59 year old woman with history of intellectual disability & memory issues, COPD, untreated ADEEL, tobacco use d/o, HTN, pAFib, & poorly controlled T2DM s/p previous hospitalization for DKA (3/18 - 3/20).      Presented second time to hospital in [place] on 3/22/2025 with complaints of dyspnea, hyperglycemia, and chest pain. She was found to have an YADIRA, LLL PE, DKA, and A-fib with RVR. She was started on an insulin drip, therapeutic dose enoxaparin, Zosyn for possible pneumonia, and diltiazem drip for management of her RVR. Her DKA resolved, she was switched from diltiazem to amiodarone drip for management of A-fib with RVR; however, her degree of respiratory failure was still concerning and she was intubated 3/23 for management of worsening hypoxia.   She had a repeat CTA that demonstrated progression of her PE and RLL collapse concerning for mucous plugging and pneumonia.   She was switched from enoxaparin to heparin drip and her antibiotics were broadened to vancomycin and Zosyn. She had a sputum culture grow Candida & has been on fluconazole since 3/25. Transferred to [facility] d/t concerns that she might require a tracheostomy secondary to her inability to liberate from the vent. Successfully extubated 4/1. Infectious disease consulted and continued patient on Zosyn course to be completed through 4/17/2025. General surgery consulted for right keiry/perineal abscess and performed incision and drainage with Penrose drain 3/31 with subsequent repeat I&D 4/2 and 4/8.       Interval history:  Since admission to [place] on 4/11, patient has been weaned off supplemental O2. Since 4/20, she has been on RA during the day, and using BiPAP(16/8) with a backup rate of 16 with FiO2 21% at night. She is currently on albuterol + HTS nebs + Anoro Ellipta inhaler. Reviewed most recent imaging & lungs largely clear  "bilaterally w mild bibasilar atelectasis on CXR from 4/9. Per chart review she's been compliant with nocturnal BiPAP since admission.     Acute on chronic hypoxic and hypercapnic respiratory failure  PE on apixaban  COPD: 40+ pack year smoker with severe obstruction & possible fixed upper airway obstruction on spirometry: on Stiolto(LAMA/LABA)  ADEEL, Sleep hypoxemia and hypoventilation: sleep titration study 08/2023 required BiPAP 22/18  Recent pneumonia completed antibiotics   intellectual disability & memory issues suspect main reason leading to piror BiPAP compliance     Plan:     Continued nocturnal BiPAP(held 4/23 for qualification testing) use recommended given her underlying ADEEL, hypoventilation, and COPD. Once she is discharged anticipate compliance to be an ongoing issue with underlying intellectual disability  nocturnal oxygen may be a more appropriate option  Stop scheduled nebs, keep albuterol neb PRN and continue Anoro Ellipta inhaler  IS and FV for pulmonary hygiene    BiPAP qualification: will try either under hypoventilation or COPD pathway   - arterial blood gases  Recent Labs   Lab 04/24/25  0612 04/23/25  1503   PH 7.45 7.48*   PCO2 39 37   PO2 68* 66*   HCO3 27 28       -  BiPAP held last night, see above for ABG May continue BiPAP the following night    - Overnight oximetry tonight - start on room air   Data reviewed by me, o2 2 liters adequatre to keep sats>88%  - Bedside spirometry ordered for today    Clinical status discussed today with respiratory therapist and patient  patient tells me she most likely will not use BIPAP again  Subjective    In chair, states she threw her BIPAP machine away, \"could'nt use anymore        Data:   /61 (BP Location: Left arm)   Pulse 96   Temp 98.7  F (37.1  C) (Oral)   Resp 20   Wt 84.2 kg (185 lb 9.6 oz)   SpO2 95%   BMI 30.89 kg/m    BP - Mean:  [85-92] 92  I/O last 3 completed shifts:  In: 660 [P.O.:660]  Out: 1950 [Urine:1550; " Drains:400]  Weight change: 0.68 kg (1 lb 8 oz)    FiO2 (%): 21 %, Resp: 20    Exam:   Gen: No acute distress  HEENT: NT, trach midline/intact  CV: RRR, no m/g/r  Resp: CTAB; non-labored   Abd: soft, nontender, BS+  Skin: no visible rashes or lesions  Ext: no edema  Neuro: PERRL, nonfocal exam    ROS: A complete 10-system review of systems was obtained and is negative with the exception of what is noted in the subjective history.    Medications:     Current Facility-Administered Medications   Medication Dose Route Frequency Provider Last Rate Last Admin     Current Facility-Administered Medications   Medication Dose Route Frequency Provider Last Rate Last Admin    amLODIPine (NORVASC) tablet 5 mg  5 mg Oral Daily Tomas Benitez MD   5 mg at 04/24/25 0906    amoxicillin (AMOXIL) capsule 500 mg  500 mg Oral Q8H DALIA Andrez Mueller MD   500 mg at 04/24/25 0550    apixaban ANTICOAGULANT (ELIQUIS) tablet 5 mg  5 mg Oral BID Tomas Benitez MD   5 mg at 04/24/25 0906    atorvastatin (LIPITOR) tablet 40 mg  40 mg Oral At Bedtime Tomas Benitez MD   40 mg at 04/23/25 2024    bacitracin ointment   Topical BID Andrez Mueller MD   Given at 04/24/25 0906    insulin aspart (NovoLOG) injection (RAPID ACTING)   Subcutaneous TID w/meals Andrez Mueller MD   16 Units at 04/24/25 0912    insulin aspart (NovoLOG) injection (RAPID ACTING)  1-10 Units Subcutaneous TID AC Tomas Benitez MD   3 Units at 04/24/25 0905    insulin aspart (NovoLOG) injection (RAPID ACTING)  1-7 Units Subcutaneous At Bedtime Tomas Benitez MD   1 Units at 04/20/25 2107    insulin glargine (LANTUS PEN) injection 32 Units  32 Units Subcutaneous At Bedtime Andrez Mueller MD   32 Units at 04/23/25 2025    menthol-zinc oxide (CALMOSEPTINE) 0.44-20.6 % ointment OINT   Topical TID Yolis Olvera MD   Given at 04/24/25 0913    miconazole (MICATIN) 2 % cream   Topical BID Andrez Mueller MD   Given at 04/24/25 0910    sodium chloride (PF) 0.9% PF flush 3 mL  3 mL  Intracatheter Q8H DALIA Tomas Benitez MD   3 mL at 04/24/25 0551    umeclidinium-vilanterol (ANORO ELLIPTA) 62.5-25 MCG/ACT oral inhaler 1 puff  1 puff Inhalation Daily Tomas Benitez MD   1 puff at 04/24/25 1101       Labs:   All laboratory and radiology has been personally reviewed by myself today.  Arterial Blood Gases   Recent Labs   Lab 04/24/25  0612 04/23/25  1503   PH 7.45 7.48*   PCO2 39 37   PO2 68* 66*   HCO3 27 28     Complete Blood Count   Recent Labs   Lab 04/24/25  0612 04/23/25  1503 04/23/25  0623 04/21/25  0814 04/21/25  0636 04/20/25  0621 04/18/25  0621   WBC  --   --  10.8 9.7 9.7  --  7.0   HGB 11.3* 11.8 11.4* 11.2* 11.3*  --  10.8*   PLT  --   --  524* 650* 628* 715* 723*     Basic Metabolic Panel  Recent Labs   Lab 04/24/25  0817 04/24/25  0612 04/23/25  2023 04/23/25  1711 04/23/25  1503 04/23/25  0815 04/23/25  0623 04/21/25  0811 04/21/25  0636 04/20/25  0833 04/20/25  0621 04/18/25  0757 04/18/25  0621   NA  --  137  --   --  141  --  138  --  140  --   --   --  139   POTASSIUM  --  4.3  --   --  3.7  --  4.6  --  4.6  --   --   --  4.9   CHLORIDE  --   --   --   --   --   --  103  --  102  --   --   --  102   CO2  --   --   --   --   --   --  29  --  28  --   --   --  28   BUN  --   --   --   --   --   --  39.3*  --  35.4*  --   --   --  40.7*   CR  --   --   --   --   --   --  1.26*  --  1.14*  --  1.17*  --  1.25*   * 190* 128* 77 156*   < > 196*   < > 178*   < >  --    < > 178*    < > = values in this interval not displayed.     Liver Function Tests  Recent Labs   Lab 04/21/25  0636   AST 18   ALT 13     Coagulation Profile  No lab results found in last 7 days.    Radiology: Personally reviewed; radiology read below    No results found.    BLAIR Mckeon CNP   Pulmonary Medicine  [facility]    Office: [phone]

## 2025-04-24 NOTE — PROGRESS NOTES
[facility]    Medicine Progress Note - Hospitalist Service    Date of Admission:  4/11/2025    Assessment & Plan   PMHx: Marly Cannon is a 59 year old woman with history of intellectual disability & memory issues, COPD, untreated ADEEL, tobacco use d/o, HTN, pAFib, & poorly controlled T2DM s/p previous hospitalization for DKA (3/18 - 3/20).  CC: Acute onset dyspnea  Acute Care Hospital Course  HPI: Presented second time to hospital in [place] on 3/22/2025 with complaints of dyspnea, hyperglycemia, and chest pain. She was found to have an YADIRA, LLL PE, DKA, and A-fib with RVR. She was started on an insulin drip, therapeutic dose enoxaparin, Zosyn for possible pneumonia, and diltiazem drip for management of her RVR. Her DKA resolved, she was switched from diltiazem to amiodarone drip for management of A-fib with RVR; however, her degree of respiratory failure was still concerning and she was intubated 3/23 for management of worsening hypoxia.   She had a repeat CTA that demonstrated progression of her PE and RLL collapse concerning for mucous plugging and pneumonia.   She was switched from enoxaparin to heparin drip and her antibiotics were broadened to vancomycin and Zosyn. She was on norepinephrine for several days d/t hypotension attributed to sedation & sepsis-related vasoplegia. It appears that she has been doing fairly long SBTs since 3/26, which seemed to be going reasonably well per RT documentation. Primary team has been having issues w/ progressively increasing FiO2 needs. She had a sputum culture grow Candida & has been on fluconazole since 3/25. Transferred to [facility] d/t concerns that she might require a tracheostomy secondary to her inability to liberate from the vent.   At [facility] patient was admitted to the ICU on a ventilator, successfully extubated 4/1 and downgraded to floor status 4/3.  Infectious disease consulted and continued patient on Zosyn course to be completed through  4/17/2025.  General surgery consulted for right keiry/perineal abscess and performed incision and drainage with Penrose drain placement 3/31 with subsequent repeat I&D 4/2 and 4/8.  Surgery requests wound VAC be changed by wound care Tuesdays and Thursdays.     LTACH Course  She transferred from [facility] to [place] for wound cares, therapies, and complex medical care.   4/12-4/14: Needing BiPAP overnight.  4/17: successfully weaned off NC O2 during the day  4/23- wound is closing well    DVT Prophylaxis: Apixaban 5 mg po bid    Barriers to Discharge:  Large right groin wound requiring frequent cares, wound vac, IV abx, nocturnal BIPAP    Active LTACH PROBLEMS  Acute on Chronic Hypoxic Respiratory Failure  VAP, PTA - resolved  COPD  ADEEL  - Completed zosyn 4/17  - inhalers as needed  - weaned off HFNC, weaned off NC during the day. Continues Bipap at night here. Not likely to use at home.  - RT following    Perineal Abscess  Condyloma Acuminata  s/p acyclovir and I&D x3  - zosyn through 4/17  - rectal tube discontinued  - noguera for wound protection  - VAC in place  - Continue wound care per WOC nurse    PE, Acute, segmental, subsegmental  - On apixaban     Reactive thrombocytosis:  Platelets historically in normal range (reviewed last 3 years)  Rising over last 10 days to 700,000, now dropping  Likely  reactive - reviewed flow chart for acute thrombocytosis, posted in note  Normal peripheral smear, liver enzymes    Seborrheic dermatitis:  Ketoconazole cream to face    T2DM  Poorly controlled with A1c 12.7. Used to be on insulin pump. No longer on it due to intellectual delay.  - BG labile earlier during admission, now stabilizing   - Increase Lantus 30 unit(s)->32 units qhs  - I:C of 1:5 -> I:C 1:4 with meals  - ISS high    CKD3b  - Creatinine stable. Monitor    Elevated Hgb in past  Historically high  Consistent with smoking (high CO), untreated ADEEL    History of intellectual delay: supportive care.          Diet:  Snacks/Supplements Adult: Expedite Cup; With Meals  Snacks/Supplements Adult: Magic Cup; With Meals  Snacks/Supplements Adult: Gelatein 20 (sugar-free); With Meals  Combination Diet Moderate Consistent Carb (60 g CHO per Meal) Diet; Easy to Chew (level 7); Thin Liquids (level 0)    Landrum Catheter: PRESENT, indication: Wound deterioration and failed external collection device  Lines: None     Cardiac Monitoring: None  Code Status: Full Code      Clinically Significant Risk Factors               # Hypoalbuminemia: Lowest albumin = 2.9 g/dL at 4/13/2025  2:14 AM, will monitor as appropriate     # Hypertension: Noted on problem list           # DMII: A1C = 12.7 % (Ref range: <5.7 %) within past 6 months       # COPD: noted on problem list        Social Drivers of Health    Tobacco Use: Medium Risk (4/2/2025)    Patient History     Smoking Tobacco Use: Former     Smokeless Tobacco Use: Never     Passive Exposure: Current   Physical Activity: Unknown (5/16/2024)    Exercise Vital Sign     Days of Exercise per Week: 0 days   Social Connections: Unknown (5/16/2024)    Social Connection and Isolation Panel [NHANES]     Frequency of Social Gatherings with Friends and Family: More than three times a week          Disposition Plan     Medically Ready for Discharge: Anticipated in 5+ Days             Andrez Mueller MD  Hospitalist Service  LTACH  Securely message with [employer] (more info)  Text page via [employer] Paging/Directory   ______________________________________________________________________    Interval History   Patient feels well.    Physical Exam   Vital Signs: Temp: 98.7  F (37.1  C) Temp src: Oral BP: 133/65 Pulse: 89   Resp: 20 SpO2: 95 % O2 Device: Nasal cannula Oxygen Delivery: 2 LPM  Weight: 185 lbs 9.6 oz    Vitals and nursing note reviewed.   Constitutional:  Well developed, obese adult female, in no acute distress   HEENT:      Eyes: Vision grossly intact, Conjunctivae clear without bleeding and without jaundice.       Head: Normocephalic and atraumatic.      Nares: without obstruction, bleeding or discharge.  Skin: Skin is warm and well perfused. Face with areas of acne, some infected. Also wide-spread seborrheic dermatitis  Pulmonary: Lungs clear to auscultation bilaterally  Cardiac: RRR, no clinically significant murmur, gallop, rub  GI: Soft, NT, BS+  Neurological: Face is symmetric. Speech fluent, clear, appropriate. Oriented to self, time and place. Moves all extremities equally, 5/5 strength bilaterally.     Medical Decision Making       25 MINUTES SPENT BY ME on the date of service doing chart review, history, exam, documentation & further activities per the note.      Data   ------------------------- PAST 24 HR DATA REVIEWED -----------------------------------------------    I have personally reviewed the following data over the past 24 hrs:    N/A  \   11.3 (L)   / N/A     137 N/A N/A /  166 (H)   4.3 N/A N/A \     Procal: N/A CRP: N/A Lactic Acid: 0.9         Imaging results reviewed over the past 24 hrs:   No results found for this or any previous visit (from the past 24 hours).

## 2025-04-24 NOTE — PROGRESS NOTES
04/23/25 1100   Appointment Info   Signing Clinician's Name / Credentials (PT) Cira Porras, PT, DPT   Rehab Comments (PT) RA   Therapeutic Activity   Therapeutic Activities: dynamic activities to improve functional performance Minutes (15298) 8   Symptoms Noted During/After Treatment None   Treatment Detail/Skilled Intervention Patient up in recliner, agreeable to PT. Set-up assist required for line management. Patient completed multiple sit<>stand transfers (from recliner and manual w/c) with CGA to FWW. Facilitated stand pivot transfer back to bed at end of session with CGA and FWW. Min A required sit>supine to lift legs onto bed.   Gait Training   Gait Training Minutes (56722) 12   Symptoms Noted During/After Treatment (Gait Training) fatigue;shortness of breath   Treatment Detail/Skilled Intervention Patient ambulated in hallway with CGA and FWW. Therapist managed w/c follow and wound vac. Patient ambulates with decreased step length but achieves step-through gait pattern. Patient visibly fatigued this date (and compared to previous sessions) with decreased total distance. SnO2 97% on RA. Patient returned to room via w/c.   Distance in Feet 75' + 25'   [place] Level (Gait Training) contact guard   Physical Assistance Level (Gait Training) 1 person assist   Weight Bearing (Gait Training) full weight-bearing   Assistive Device (Gait Training) rolling walker  (FWW)   PT Discharge Planning   PT Plan Bed mobility, transfers, gait, stairs, NuStep   PT Discharge Recommendation (DC Rec) Transitional Care Facility;home with home care physical therapy   PT Rationale for DC Rec Pt requires assist for safe functional mobility.   PT Brief overview of current status Patient with increased fatigue this date as evidenced by decreased total distance ambulated.   PT Total Distance Amb During Session (feet) 100   Physical Therapy Time and Intention   Timed Code Treatment Minutes 20   Total Session Time (sum of timed and  untimed services) 20     This note was created for Utilization Review purposes only and the services rendered in this note are not a reflection of services provided by this author.

## 2025-04-24 NOTE — TREATMENT PLAN
RESPIRATORY CARE NOTE    Pt on 2-4 lpm nc, bs clear strong dry cough. Flutter valve x2 cycles of 10 rep.  Bedside spirometry done  Pt to cont on Nc during the day bipap at night.    Cont with current poc.

## 2025-04-24 NOTE — PLAN OF CARE
Problem: Wound  Goal: Improved Oral Intake  Outcome: Progressing     Problem: Wound  Goal: Optimal Pain Control and Function  Intervention: Prevent or Manage Pain  Recent Flowsheet Documentation  Taken 4/24/2025 1316 by Ruchi Lin RN  Complementary Therapy: (TV) other (see comments)   Goal Outcome Evaluation:  Patient alert, oriented with intermittent forgetfulness,  communicates her needs, denies pains/discomfort.  Appetite  good ate !00% of both her breakfast and lunch with adequate oral fluid intake.

## 2025-04-24 NOTE — PLAN OF CARE
Problem: Adult Inpatient Plan of Care  Goal: Plan of Care Review  Description: The Plan of Care Review/Shift note should be completed every shift.  The Outcome Evaluation is a brief statement about your assessment that the patient is improving, declining, or no change.  This information will be displayed automatically on your shiftnote.  Outcome: Progressing  Goal: Absence of Hospital-Acquired Illness or Injury  Intervention: Identify and Manage Fall Risk  Recent Flowsheet Documentation  Taken 4/24/2025 0116 by Shraddha Galeano RN  Safety Promotion/Fall Prevention: activity supervised  Intervention: Prevent Infection  Recent Flowsheet Documentation  Taken 4/24/2025 0116 by Shraddha Galeano RN  Infection Prevention: hand hygiene promoted   Goal Outcome Evaluation:       Patient was on pulse oximetry study on nasal cannula with 1  litre of oxygen, at the beginning of the shift and de-sat up to 87, RT increase the 02 to 2 liters around 0100 .  02 sat was maintained  between 94-96 %throughout the shift, patient was repositioned every 2 hours, wound vac intact  draining well, Patient slept most of the shift.

## 2025-04-25 ENCOUNTER — APPOINTMENT (OUTPATIENT)
Dept: PHYSICAL THERAPY | Facility: CLINIC | Age: 60
DRG: 602 | End: 2025-04-25
Attending: INTERNAL MEDICINE
Payer: COMMERCIAL

## 2025-04-25 ENCOUNTER — APPOINTMENT (OUTPATIENT)
Dept: OCCUPATIONAL THERAPY | Facility: CLINIC | Age: 60
End: 2025-04-25
Attending: INTERNAL MEDICINE
Payer: COMMERCIAL

## 2025-04-25 VITALS
TEMPERATURE: 97.7 F | DIASTOLIC BLOOD PRESSURE: 75 MMHG | HEART RATE: 89 BPM | WEIGHT: 185.6 LBS | SYSTOLIC BLOOD PRESSURE: 139 MMHG | OXYGEN SATURATION: 97 % | BODY MASS INDEX: 30.89 KG/M2 | RESPIRATION RATE: 20 BRPM

## 2025-04-25 LAB
ANION GAP SERPL CALCULATED.3IONS-SCNC: 12 MMOL/L (ref 7–15)
BUN SERPL-MCNC: 43.3 MG/DL (ref 8–23)
CALCIUM SERPL-MCNC: 9.9 MG/DL (ref 8.8–10.4)
CHLORIDE SERPL-SCNC: 103 MMOL/L (ref 98–107)
CREAT SERPL-MCNC: 1.3 MG/DL (ref 0.51–0.95)
EGFRCR SERPLBLD CKD-EPI 2021: 47 ML/MIN/1.73M2
ERYTHROCYTE [DISTWIDTH] IN BLOOD BY AUTOMATED COUNT: 14.4 % (ref 10–15)
GLUCOSE BLDC GLUCOMTR-MCNC: 149 MG/DL (ref 70–99)
GLUCOSE BLDC GLUCOMTR-MCNC: 209 MG/DL (ref 70–99)
GLUCOSE BLDC GLUCOMTR-MCNC: 262 MG/DL (ref 70–99)
GLUCOSE BLDC GLUCOMTR-MCNC: 291 MG/DL (ref 70–99)
GLUCOSE SERPL-MCNC: 137 MG/DL (ref 70–99)
HCO3 SERPL-SCNC: 25 MMOL/L (ref 22–29)
HCT VFR BLD AUTO: 35.8 % (ref 35–47)
HGB BLD-MCNC: 11.4 G/DL (ref 11.7–15.7)
MAGNESIUM SERPL-MCNC: 1.7 MG/DL (ref 1.7–2.3)
MCH RBC QN AUTO: 30.2 PG (ref 26.5–33)
MCHC RBC AUTO-ENTMCNC: 31.8 G/DL (ref 31.5–36.5)
MCV RBC AUTO: 95 FL (ref 78–100)
PLATELET # BLD AUTO: 400 10E3/UL (ref 150–450)
POTASSIUM SERPL-SCNC: 4.2 MMOL/L (ref 3.4–5.3)
RBC # BLD AUTO: 3.77 10E6/UL (ref 3.8–5.2)
SODIUM SERPL-SCNC: 140 MMOL/L (ref 135–145)
WBC # BLD AUTO: 10.6 10E3/UL (ref 4–11)

## 2025-04-25 PROCEDURE — 97530 THERAPEUTIC ACTIVITIES: CPT | Mod: GP | Performed by: PHYSICAL THERAPIST

## 2025-04-25 PROCEDURE — 120N000017 HC R&B RESPIRATORY CARE

## 2025-04-25 PROCEDURE — 83735 ASSAY OF MAGNESIUM: CPT | Performed by: STUDENT IN AN ORGANIZED HEALTH CARE EDUCATION/TRAINING PROGRAM

## 2025-04-25 PROCEDURE — 97535 SELF CARE MNGMENT TRAINING: CPT | Mod: GO | Performed by: OCCUPATIONAL THERAPIST

## 2025-04-25 PROCEDURE — 250N000013 HC RX MED GY IP 250 OP 250 PS 637: Performed by: INTERNAL MEDICINE

## 2025-04-25 PROCEDURE — 999N000157 HC STATISTIC RCP TIME EA 10 MIN

## 2025-04-25 PROCEDURE — 82435 ASSAY OF BLOOD CHLORIDE: CPT | Performed by: STUDENT IN AN ORGANIZED HEALTH CARE EDUCATION/TRAINING PROGRAM

## 2025-04-25 PROCEDURE — 97116 GAIT TRAINING THERAPY: CPT | Mod: GP | Performed by: PHYSICAL THERAPIST

## 2025-04-25 PROCEDURE — 99232 SBSQ HOSP IP/OBS MODERATE 35: CPT | Performed by: NURSE PRACTITIONER

## 2025-04-25 PROCEDURE — 94660 CPAP INITIATION&MGMT: CPT

## 2025-04-25 PROCEDURE — 99231 SBSQ HOSP IP/OBS SF/LOW 25: CPT | Performed by: INTERNAL MEDICINE

## 2025-04-25 PROCEDURE — G0463 HOSPITAL OUTPT CLINIC VISIT: HCPCS

## 2025-04-25 PROCEDURE — 94799 UNLISTED PULMONARY SVC/PX: CPT

## 2025-04-25 PROCEDURE — 250N000013 HC RX MED GY IP 250 OP 250 PS 637: Performed by: STUDENT IN AN ORGANIZED HEALTH CARE EDUCATION/TRAINING PROGRAM

## 2025-04-25 PROCEDURE — 36415 COLL VENOUS BLD VENIPUNCTURE: CPT | Performed by: STUDENT IN AN ORGANIZED HEALTH CARE EDUCATION/TRAINING PROGRAM

## 2025-04-25 PROCEDURE — 250N000012 HC RX MED GY IP 250 OP 636 PS 637: Performed by: INTERNAL MEDICINE

## 2025-04-25 RX ORDER — MULTIPLE VITAMINS W/ MINERALS TAB 9MG-400MCG
1 TAB ORAL DAILY
Status: DISCONTINUED | OUTPATIENT
Start: 2025-04-26 | End: 2025-05-09 | Stop reason: HOSPADM

## 2025-04-25 RX ADMIN — INSULIN ASPART 1 UNITS: 100 INJECTION, SOLUTION INTRAVENOUS; SUBCUTANEOUS at 09:03

## 2025-04-25 RX ADMIN — ATORVASTATIN CALCIUM 40 MG: 40 TABLET, FILM COATED ORAL at 21:19

## 2025-04-25 RX ADMIN — UMECLIDINIUM BROMIDE AND VILANTEROL TRIFENATATE 1 PUFF: 62.5; 25 POWDER RESPIRATORY (INHALATION) at 09:09

## 2025-04-25 RX ADMIN — AMLODIPINE BESYLATE 5 MG: 5 TABLET ORAL at 09:08

## 2025-04-25 RX ADMIN — INSULIN GLARGINE 32 UNITS: 100 INJECTION, SOLUTION SUBCUTANEOUS at 21:24

## 2025-04-25 RX ADMIN — APIXABAN 5 MG: 5 TABLET, FILM COATED ORAL at 21:20

## 2025-04-25 RX ADMIN — AMOXICILLIN 500 MG: 500 CAPSULE ORAL at 21:19

## 2025-04-25 RX ADMIN — INSULIN ASPART 13 UNITS: 100 INJECTION, SOLUTION INTRAVENOUS; SUBCUTANEOUS at 18:35

## 2025-04-25 RX ADMIN — MICONAZOLE NITRATE: 2 CREAM TOPICAL at 21:26

## 2025-04-25 RX ADMIN — AMOXICILLIN 500 MG: 500 CAPSULE ORAL at 13:26

## 2025-04-25 RX ADMIN — INSULIN ASPART 5 UNITS: 100 INJECTION, SOLUTION INTRAVENOUS; SUBCUTANEOUS at 16:58

## 2025-04-25 RX ADMIN — INSULIN ASPART 17 UNITS: 100 INJECTION, SOLUTION INTRAVENOUS; SUBCUTANEOUS at 09:07

## 2025-04-25 RX ADMIN — OXYCODONE HYDROCHLORIDE 5 MG: 5 TABLET ORAL at 13:33

## 2025-04-25 RX ADMIN — MICONAZOLE NITRATE: 2 CREAM TOPICAL at 09:09

## 2025-04-25 RX ADMIN — ANORECTAL OINTMENT: 15.7; .44; 24; 20.6 OINTMENT TOPICAL at 09:08

## 2025-04-25 RX ADMIN — AMOXICILLIN 500 MG: 500 CAPSULE ORAL at 05:44

## 2025-04-25 RX ADMIN — INSULIN ASPART 28 UNITS: 100 INJECTION, SOLUTION INTRAVENOUS; SUBCUTANEOUS at 13:25

## 2025-04-25 RX ADMIN — ANORECTAL OINTMENT: 15.7; .44; 24; 20.6 OINTMENT TOPICAL at 13:26

## 2025-04-25 RX ADMIN — APIXABAN 5 MG: 5 TABLET, FILM COATED ORAL at 09:08

## 2025-04-25 RX ADMIN — ANORECTAL OINTMENT: 15.7; .44; 24; 20.6 OINTMENT TOPICAL at 21:31

## 2025-04-25 RX ADMIN — INSULIN ASPART 7 UNITS: 100 INJECTION, SOLUTION INTRAVENOUS; SUBCUTANEOUS at 12:03

## 2025-04-25 ASSESSMENT — ACTIVITIES OF DAILY LIVING (ADL)
ADLS_ACUITY_SCORE: 64
ADLS_ACUITY_SCORE: 64
ADLS_ACUITY_SCORE: 62
ADLS_ACUITY_SCORE: 64
ADLS_ACUITY_SCORE: 64
ADLS_ACUITY_SCORE: 62
ADLS_ACUITY_SCORE: 64
ADLS_ACUITY_SCORE: 62
ADLS_ACUITY_SCORE: 64
ADLS_ACUITY_SCORE: 62
ADLS_ACUITY_SCORE: 64
ADLS_ACUITY_SCORE: 62
ADLS_ACUITY_SCORE: 62
ADLS_ACUITY_SCORE: 64
ADLS_ACUITY_SCORE: 62
ADLS_ACUITY_SCORE: 64
ADLS_ACUITY_SCORE: 64

## 2025-04-25 NOTE — PROGRESS NOTES
[facility]    Medicine Progress Note - Hospitalist Service    Date of Admission:  4/11/2025    Assessment & Plan   PMHx: Marly Cannon is a 59 year old woman with history of intellectual disability & memory issues, COPD, untreated ADEEL, tobacco use d/o, HTN, pAFib, & poorly controlled T2DM s/p previous hospitalization for DKA (3/18 - 3/20).  CC: Acute onset dyspnea  Acute Care Hospital Course  HPI: Presented second time to hospital in [place] on 3/22/2025 with complaints of dyspnea, hyperglycemia, and chest pain. She was found to have an YADIRA, LLL PE, DKA, and A-fib with RVR. She was started on an insulin drip, therapeutic dose enoxaparin, Zosyn for possible pneumonia, and diltiazem drip for management of her RVR. Her DKA resolved, she was switched from diltiazem to amiodarone drip for management of A-fib with RVR; however, her degree of respiratory failure was still concerning and she was intubated 3/23 for management of worsening hypoxia.   She had a repeat CTA that demonstrated progression of her PE and RLL collapse concerning for mucous plugging and pneumonia.   She was switched from enoxaparin to heparin drip and her antibiotics were broadened to vancomycin and Zosyn. She was on norepinephrine for several days d/t hypotension attributed to sedation & sepsis-related vasoplegia. It appears that she has been doing fairly long SBTs since 3/26, which seemed to be going reasonably well per RT documentation. Primary team has been having issues w/ progressively increasing FiO2 needs. She had a sputum culture grow Candida & has been on fluconazole since 3/25. Transferred to [facility] d/t concerns that she might require a tracheostomy secondary to her inability to liberate from the vent.   At [facility] patient was admitted to the ICU on a ventilator, successfully extubated 4/1 and downgraded to floor status 4/3.  Infectious disease consulted and continued patient on Zosyn course to be completed through  4/17/2025.  General surgery consulted for right keiry/perineal abscess and performed incision and drainage with Penrose drain placement 3/31 with subsequent repeat I&D 4/2 and 4/8.  Surgery requests wound VAC be changed by wound care Tuesdays and Thursdays.     LTACH Course  She transferred from [facility] to [place] for wound cares, therapies, and complex medical care.   4/12-4/14: Needing BiPAP overnight.  4/17: successfully weaned off NC O2 during the day  4/23- wound is closing well - [facility] plan to remove penrose drain on 4/29/2025    DVT Prophylaxis: Apixaban 5 mg po bid    Barriers to Discharge:  Large right groin wound requiring frequent cares, wound vac, IV abx, nocturnal BIPAP    Active LTACH PROBLEMS  Acute on Chronic Hypoxic Respiratory Failure  VAP, PTA - resolved  COPD  ADEEL  - Completed zosyn 4/17  - inhalers as needed  - weaned off HFNC, weaned off NC during the day. Continues Bipap at night here. Not likely to use at home.  - RT following    Perineal Abscess  Condyloma Acuminata  s/p acyclovir and I&D x3  - zosyn through 4/17  - rectal tube discontinued  - noguera for wound protection  - VAC in place  - Continue wound care per WOC nurse    PE, Acute, segmental, subsegmental  - On apixaban     Reactive thrombocytosis:  Platelets historically in normal range (reviewed last 3 years)  Rising over last 10 days to 700,000, now dropping  Likely  reactive - reviewed flow chart for acute thrombocytosis, posted in note  Normal peripheral smear, liver enzymes    Seborrheic dermatitis:  Ketoconazole cream to face    T2DM  Poorly controlled with A1c 12.7. Used to be on insulin pump. No longer on it due to intellectual delay.  - BG labile earlier during admission, now stabilizing   - Increase Lantus 30 unit(s)->32 units qhs  - I:C of 1:5 -> I:C 1:4 with meals  - ISS high    CKD3b  - Creatinine stable. Monitor    Elevated Hgb in past  Historically high  Consistent with smoking (high CO), untreated ADEEL    History of  intellectual delay: supportive care.          Diet: Snacks/Supplements Adult: Expedite Cup; With Meals  Snacks/Supplements Adult: Magic Cup; With Meals  Snacks/Supplements Adult: Gelatein 20 (sugar-free); With Meals  Combination Diet Moderate Consistent Carb (60 g CHO per Meal) Diet; Easy to Chew (level 7); Thin Liquids (level 0)    Landrum Catheter: PRESENT, indication: Wound deterioration and failed external collection device  Lines: None     Cardiac Monitoring: None  Code Status: Full Code      Clinically Significant Risk Factors               # Hypoalbuminemia: Lowest albumin = 2.9 g/dL at 4/13/2025  2:14 AM, will monitor as appropriate     # Hypertension: Noted on problem list           # DMII: A1C = 12.7 % (Ref range: <5.7 %) within past 6 months       # COPD: noted on problem list        Social Drivers of Health    Tobacco Use: Medium Risk (4/2/2025)    Patient History     Smoking Tobacco Use: Former     Smokeless Tobacco Use: Never     Passive Exposure: Current   Physical Activity: Unknown (5/16/2024)    Exercise Vital Sign     Days of Exercise per Week: 0 days   Social Connections: Unknown (5/16/2024)    Social Connection and Isolation Panel [NHANES]     Frequency of Social Gatherings with Friends and Family: More than three times a week          Disposition Plan     Medically Ready for Discharge: Anticipated in 5+ Days             Andrez Mueller MD  Hospitalist Service  LTACH  Securely message with [employer] (more info)  Text page via [facility] Paging/Directory   ______________________________________________________________________    Interval History   Patient slept well, good appetite, walking    Physical Exam   Vital Signs: Temp: 97.7  F (36.5  C) Temp src: Axillary BP: 139/75 Pulse: 89   Resp: 20 SpO2: 97 % O2 Device: BiPAP/CPAP Oxygen Delivery: 2 LPM  Weight: 185 lbs 12.8 oz    Vitals and nursing note reviewed.   Constitutional:  Well developed, obese adult female, in no acute distress   HEENT:      Eyes:  Vision grossly intact, Conjunctivae clear without bleeding and without jaundice.      Head: Normocephalic and atraumatic.      Nares: without obstruction, bleeding or discharge.  Skin: Skin is warm and well perfused. Face with areas of acne, some infected. Also wide-spread seborrheic dermatitis  Pulmonary: Lungs clear to auscultation bilaterally  Cardiac: RRR, no clinically significant murmur, gallop, rub  GI: Soft, NT, BS+  Neurological: Face is symmetric. Speech fluent, clear, appropriate. Oriented to self, time and place. Moves all extremities equally, 5/5 strength bilaterally.     Medical Decision Making       25 MINUTES SPENT BY ME on the date of service doing chart review, history, exam, documentation & further activities per the note.      Data   ------------------------- PAST 24 HR DATA REVIEWED -----------------------------------------------    I have personally reviewed the following data over the past 24 hrs:    10.6  \   11.4 (L)   / 400     140 103 43.3 (H) /  149 (H)   4.2 25 1.30 (H) \       Imaging results reviewed over the past 24 hrs:   No results found for this or any previous visit (from the past 24 hours).

## 2025-04-25 NOTE — PROGRESS NOTES
Pulmonary Progress Note    Admit Date: 4/11/2025  CODE: Full Code    Assessment:   Marly Cannon is a 59 year old woman with history of intellectual disability & memory issues, COPD, untreated ADEEL, tobacco use d/o, HTN, pAFib, & poorly controlled T2DM s/p previous hospitalization for DKA (3/18 - 3/20).      Pt presented to hospital in [place] on 3/22/2025 with complaints of dyspnea, hyperglycemia, and chest pain. She was found to have an YADIRA, LLL PE, DKA, and A-fib with RVR. She was started on an insulin drip, therapeutic dose enoxaparin, Zosyn for possible pneumonia, and diltiazem drip for management of her RVR. Her DKA resolved, she was switched from diltiazem to amiodarone drip for management of A-fib with RVR; however, her degree of respiratory failure was still concerning and she was intubated 3/23 for management of worsening hypoxia. She had a repeat CTA that demonstrated progression of her PE and RLL collapse concerning for mucous plugging and pneumonia. She was switched from enoxaparin to heparin drip and her antibiotics were broadened to vancomycin and Zosyn. She had a sputum culture grow Candida & has been on fluconazole since 3/25. Transferred to [facility] d/t concerns that she might require a tracheostomy secondary to her inability to liberate from the vent. Successfully extubated 4/1. Infectious disease consulted and continued patient on Zosyn course to be completed through 4/17/2025. General surgery consulted for right keiry/perineal abscess and performed incision and drainage with Penrose drain 3/31 with subsequent repeat I&D 4/2 and 4/8.       Interval history:  Since admission to [place] on 4/11, patient has been weaned off supplemental O2. Since 4/20, she has been on RA during the day, and using BiPAP(16/8) with a backup rate of 16 with FiO2 21% at night. She is currently on albuterol + HTS nebs + Anoro Ellipta inhaler. Reviewed most recent imaging & lungs largely clear bilaterally w  "mild bibasilar atelectasis on CXR from 4/9. Per chart review she's been compliant with nocturnal BiPAP since admission.     Acute on chronic hypoxic and hypercapnic respiratory failure  PE on apixaban  COPD: 40+ pack year smoker with severe obstruction & possible fixed upper airway obstruction on spirometry: on Stiolto(LAMA/LABA)  ADEEL, Sleep hypoxemia and hypoventilation: sleep titration study 08/2023 required BiPAP 22/18  Recent pneumonia completed antibiotics   intellectual disability & memory issues suspect main reason leading to piror BiPAP compliance     Plan:     Hold BIPAPA for the next few nights, check abg am 4/28(last abg did not qualify her for BIPAP), Once she is discharged anticipate compliance to be an ongoing issue with underlying intellectual disability  nocturnal oxygen may be a more appropriate option, she does tell me today that\"I iwll try BIPAP again at  home, if I get it\"  Stop scheduled nebs, keep albuterol neb PRN and continue Anoro Ellipta inhaler  IS and FV for pulmonary hygiene    BiPAP qualification: will try either under hypoventilation or COPD pathway   - arterial blood gases  Recent Labs   Lab 04/24/25  0612 04/23/25  1503   PH 7.45 7.48*   PCO2 39 37   PO2 68* 66*   HCO3 27 28     - Overnight oximetry tonight - start on room air   Data reviewed by me, o2 2 liters adequatre to keep sats>88%  - Bedside spirometry ordered for today    Clinical status discussed today with respiratory therapist and patient  patient tells me she most likely will not use BIPAP again  Subjective    In chair, states she threw her BIPAP machine away, \"could'nt use anymore\"  but is willing to try again. If she qualifies for a new machine     Data:   /70 (BP Location: Left arm, Patient Position: Semi-Bahena's, Cuff Size: Adult Regular)   Pulse 85   Temp 98.1  F (36.7  C) (Oral)   Resp 20   Wt 84.3 kg (185 lb 12.8 oz)   SpO2 94%   BMI 30.92 kg/m       I/O last 3 completed shifts:  In: 855 [P.O.:852; " I.V.:3]  Out: 2325 [Urine:1400; Drains:925]  Weight change: 0.091 kg (3.2 oz)    FiO2 (%): (S) 26 %, Resp: 20    Exam:   Gen: No acute distress  HEENT: NT, trach midline/intact  CV: RRR, no m/g/r  Resp: CTAB; non-labored   Abd: soft, nontender, BS+  Skin: no visible rashes or lesions  Ext: no edema  Neuro: PERRL, nonfocal exam    ROS: A complete 10-system review of systems was obtained and is negative with the exception of what is noted in the subjective history.    Medications:     Current Facility-Administered Medications   Medication Dose Route Frequency Provider Last Rate Last Admin     Current Facility-Administered Medications   Medication Dose Route Frequency Provider Last Rate Last Admin    amLODIPine (NORVASC) tablet 5 mg  5 mg Oral Daily Tomas Benitez MD   5 mg at 04/25/25 0908    amoxicillin (AMOXIL) capsule 500 mg  500 mg Oral Q8H DALIA Andrez Mueller MD   500 mg at 04/25/25 0544    apixaban ANTICOAGULANT (ELIQUIS) tablet 5 mg  5 mg Oral BID Tomas Benitez MD   5 mg at 04/25/25 0908    atorvastatin (LIPITOR) tablet 40 mg  40 mg Oral At Bedtime Tomas Benitez MD   40 mg at 04/24/25 2115    insulin aspart (NovoLOG) injection (RAPID ACTING)   Subcutaneous TID w/meals Andrez Mueller MD   17 Units at 04/25/25 0907    insulin aspart (NovoLOG) injection (RAPID ACTING)  1-10 Units Subcutaneous TID AC Tomas Benitez MD   1 Units at 04/25/25 0903    insulin aspart (NovoLOG) injection (RAPID ACTING)  1-7 Units Subcutaneous At Bedtime Tomas Benitez MD   1 Units at 04/20/25 2107    insulin glargine (LANTUS PEN) injection 32 Units  32 Units Subcutaneous At Bedtime Andrez Mueller MD   32 Units at 04/24/25 2126    menthol-zinc oxide (CALMOSEPTINE) 0.44-20.6 % ointment OINT   Topical TID Yolis Olvera MD   Given at 04/25/25 0908    miconazole (MICATIN) 2 % cream   Topical BID Andrez Mueller MD   Given at 04/25/25 0909    sodium chloride (PF) 0.9% PF flush 3 mL  3 mL Intracatheter Q8H Tomas Daniels MD   3 mL at  04/25/25 0544    umeclidinium-vilanterol (ANORO ELLIPTA) 62.5-25 MCG/ACT oral inhaler 1 puff  1 puff Inhalation Daily Tomas Benitez MD   1 puff at 04/25/25 0909       Labs:   All laboratory and radiology has been personally reviewed by myself today.  Arterial Blood Gases   Recent Labs   Lab 04/24/25  0612 04/23/25  1503   PH 7.45 7.48*   PCO2 39 37   PO2 68* 66*   HCO3 27 28     Complete Blood Count   Recent Labs   Lab 04/25/25  0650 04/24/25  0612 04/23/25  1503 04/23/25  0623 04/21/25  0814 04/21/25  0636   WBC 10.6  --   --  10.8 9.7 9.7   HGB 11.4* 11.3* 11.8 11.4* 11.2* 11.3*     --   --  524* 650* 628*     Basic Metabolic Panel  Recent Labs   Lab 04/25/25  0816 04/25/25  0650 04/24/25  2125 04/24/25  1721 04/24/25  0817 04/24/25  0612 04/23/25  1711 04/23/25  1503 04/23/25  0815 04/23/25  0623 04/21/25  0811 04/21/25  0636 04/20/25  0833 04/20/25  0621   NA  --  140  --   --   --  137  --  141  --  138  --  140   < >  --    POTASSIUM  --  4.2  --   --   --  4.3  --  3.7  --  4.6  --  4.6   < >  --    CHLORIDE  --  103  --   --   --   --   --   --   --  103  --  102  --   --    CO2  --  25  --   --   --   --   --   --   --  29  --  28  --   --    BUN  --  43.3*  --   --   --   --   --   --   --  39.3*  --  35.4*  --   --    CR  --  1.30*  --   --   --   --   --   --   --  1.26*  --  1.14*  --  1.17*   * 137* 121* 145*   < > 190*   < > 156*   < > 196*   < > 178*   < >  --     < > = values in this interval not displayed.     Liver Function Tests  Recent Labs   Lab 04/21/25  0636   AST 18   ALT 13     Coagulation Profile  No lab results found in last 7 days.    Radiology: Personally reviewed; radiology read below    No results found.    BLAIR Mckeon CNP   Pulmonary Medicine  [facility]    Office: [phone]

## 2025-04-25 NOTE — PLAN OF CARE
Problem: Pain Acute  Goal: Optimal Pain Control and Function  Intervention: Prevent or Manage Pain  Recent Flowsheet Documentation  Taken 4/25/2025 0915 by Justino Healy RN  Sensory Stimulation Regulation: quiet environment promoted  Bowel Elimination Promotion: adequate fluid intake promoted  Medication Review/Management: medications reviewed     Problem: Wound  Goal: Improved Oral Intake  Outcome: Progressing  Intervention: Promote and Optimize Oral Intake  Recent Flowsheet Documentation  Taken 4/25/2025 0915 by Justino Healy RN  Oral Nutrition Promotion: rest periods promoted  Nutrition Support Management: weight trending reviewed     Problem: Wound  Goal: Optimal Wound Healing  Outcome: Progressing   Goal Outcome Evaluation:         Alert and oriented x4.Able to communicate needs.On O2 inhalation at 2L/min per nasal cannula.Good food intake.Blood sugar is 174 and 291.  PRN Oxycodone 5 mg is given prior to dressing changed.Wound Vac dressing is changed.Noted redness with whitish top at the nose bridge.Seen by wound nurse.Skin is open to air.  Ambulated to BR.Landrum output is yellow and clear.

## 2025-04-25 NOTE — PROVIDER NOTIFICATION
04/24/25 5435   Tech Time   $Tech Time (10 minute increments) 2   Mode: CPAP/ BiPAP/ AVAPS/ AVAPS AE   CPAP/BiPAP/ AVAPS/ AVAPS AE Mode BiPAP S/T   Equipment   Device V60   Device Serial Number [id number]   CPAP/BiPAP/Settings   $CPAP/BiPAP Subsequent completed   BIPAP/CPAP On Standby On   IPAP/EPAP (cmH2O) 16/8   Rate (breaths/min) 16   Oxygen (%) 21   Timed Inspiration (sec) 0.9   IPAP Rise Time (sec) 2 sec   IPAP RISE  Settings (V60) 2   CPAP/BiPAP Patient Parameters   IPAP (cm H2O) 16 cmH2O   EPAP (cm H2O) 8 cmH2O   Pressure Support (cm H2O) 8 cmH2O   RR Total (breaths/min) 21 breaths/min   Vt (mL) 543 mL   Minute Ventilation (L/min) 12 L/min   Peak Inspiratory Pressure (cm H2O) 16 cmH2O   Pt.  Leak (L/min) 0 L/min   CPAP/BiPAP/AVAPS/AVAPS AE Alarms   High Pressure (cm H2O) 25 cmH2O   Low Pressure (cm H2O) 5   Low Pressure Delay (sec) 20 sec   Apnea (sec) 20   Lo Min Vent 3   High Rate (breaths/min) 40 breaths/min   Low Rate (breaths/min) 16   Audible Alarm set at (Volume of Alarm) 7   Humidifier Checked N/A   RT Device Skin Assessment   Oxygen Delivery Device CPAP/BiPAP Mask   Interface Face Mask - Medium   Nasal Prongs/Masks Nasal Prong - Medium   Ventilator Arm In Place No   Site Appearance neck circumference Clean and dry   Site Appearance nares (inner) Clean and dry   Site Appearance lips Clean and dry   Site Appearance bridge of nose Clean and dry   Site Appearance of ears Clean and dry   Site Appearance occiput Blanchable redness   Strap Tightness Finger Allowance between head and device strap   Device Skin Interventions Taken Skin barrier applied     Patient removed from 2LNC and placed on V60 BIPAP on above settings. RT following.  SpO2 = 92 on 21%  HR 88  RR 21.

## 2025-04-25 NOTE — TREATMENT PLAN
RESPIRATORY CARE NOTE    Pt off bipap this am, and placed on 2lnc bs clear and diminished, strong dry cough. Pt had red blanchable area on bridge of nose. Very delicate skin in face.  Bipap to be on hold for 2 nights. ABG in am Monday 4/28  Pt did use flutter valve x2 and IS x2 2 cycles of 10 rep.

## 2025-04-25 NOTE — PROGRESS NOTES
CLINICAL NUTRITION SERVICES - REASSESSMENT NOTE     RECOMMENDATIONS FOR MDs/PROVIDERS TO ORDER:  None    Registered Dietitian Interventions:  Daily MVI/M    Future/Additional Recommendations:  Continue to monitor PO intakes, wounds, and weight trends     INFORMATION OBTAINED  Assessed patient in room.    CURRENT NUTRITION ORDERS  Diet: Orders Placed This Encounter      Combination Diet Moderate Consistent Carb (60 g CHO per Meal) Diet; Easy to Chew (level 7); Thin Liquids (level 0)    Snacks/Supplements: Gelatein 20 daily, Magic Cup daily, and Expedite cup      CURRENT INTAKE/TOLERANCE  Patient eating well, % of meals TID, she has a good appetite and likes all supplements. She states that she is not a picky eater.    NEW FINDINGS  GI symptoms:  Patient stooling regularly, 0-3x/day (soft/formed)       Skin/wounds:  Right groin surgical wound improving, Stage 2 bilateral PI       Nutrition-relevant labs: intermittent hyperglycemia  Recent Labs   Lab 04/25/25  1201 04/25/25  0816 04/25/25  0650 04/24/25  2125 04/24/25  1721 04/24/25  1215   * 149* 137* 121* 145* 166*     Nutrition-relevant medications:  novolog, lantus    Weight: relatively stable this admission  04/25/25 0554 84.3 kg (185 lb 12.8 oz) Bed scale   04/24/25 0530 84.2 kg (185 lb 9.6 oz) Bed scale   04/23/25 0600 83.5 kg (184 lb 1.6 oz) Bed scale   04/22/25 0555 82.8 kg (182 lb 8 oz) Bed scale   04/21/25 0822 82.6 kg (182 lb) --   04/19/25 0536 86.2 kg (190 lb) Bed scale   04/17/25 0330 85.5 kg (188 lb 9.6 oz) Bed scale   04/15/25 0620 86.2 kg (190 lb) Bed scale   04/13/25 0610 83.1 kg (183 lb 1.6 oz) --   04/12/25 0609 85.7 kg (189 lb) Bed scale     ASSESSED NUTRITION NEEDS  Dosing Weight: 85.5 kg, based on actual wt  Estimated Energy Needs: 3562-1315 kcals/day (20 - 25 kcals/kg)  Justification: Obese  Estimated Protein Needs: 102-128 grams protein/day (1.2 - 1.5 grams of pro/kg)  Justification: CKD and Wound healing  Estimated Fluid Needs:  4072-0407 mL/day (1 mL/kcal)  Justification: Maintenance    MALNUTRITION  % Intake: No decreased intake noted  % Weight Loss: Weight loss does not meet criteria   Subcutaneous Fat Loss: None observed  Muscle Loss: None observed  Fluid Accumulation/Edema: Moderate to severe, 2-4+  Malnutrition Diagnosis: Patient does not meet two of the established criteria necessary for diagnosing malnutrition  Malnutrition Present on Admission: No    EVALUATION OF THE PROGRESS TOWARD GOALS   Previous Goals  - Blood glucose 140-180 mg/dL - not met  - Patient to consume % of nutritionally adequate meal trays TID, or the equivalent with supplements/snacks - met  - Wound healing per WOC documentation - progressing    Previous Nutrition Diagnosis  Increased nutrient needs (protein) related to wound healing as evidenced by R-groin surgical wound, stg 2 PI bilateral buttocks.  Evaluation: No change    NUTRITION DIAGNOSIS  Increased nutrient needs (protein) related to wound healing as evidenced by R-groin surgical wound, stg 2 PI bilateral buttocks.    INTERVENTIONS  Medical food supplement therapy    GOALS  - Blood glucose 140-180 mg/dL  - Patient to consume % of nutritionally adequate meal trays TID, or the equivalent with supplements/snacks  - Wound healing per WOC documentation     MONITORING/EVALUATION  Progress toward goals will be monitored and evaluated per policy.    TAMAR Mcmahon RDN  [facility]  Office: [phone] or [place]

## 2025-04-25 NOTE — PROGRESS NOTES
RESPIRATORY CARE NOTE    Patient on 2-4LNC during the day and V60 BIPAP 16/8 Backup rate: 16  FiO2: 26%.    Continue with current poc.

## 2025-04-25 NOTE — PROGRESS NOTES
[facility] Nurse Inpatient Assessment     Consulted for: perineal    Summary: Nurse asked [facility] to assess bridge of nose, patient has been using BiPap.  Area is slow to brittny, Bipap will not be used over the weekend, will ensure with next [facility] vist that erythema has resolved  Previous assessment below:  SHIRA FerrerN, RN, PHN, HNB-BC, CWOCN  Pager no longer is use, please contact through [email or web address] group: [facility] [facility]     [facility] nurse follow-up plan: weekly    Patient History (according to provider note(s):      Marly Cannon is a 59 year old woman with history of intellectual disability & memory issues, COPD*on supplemental oxygen, untreated ADEEL, tobacco use d/o, HTN, pAFib, & poorly controlled T2DM w/ recent hospitalization for DKA (3/18 - 3/20), who presented to the hospital in [facility] on 3/22/2025 with complaints of dyspnea, hyperglycemia, and chest pain. She was found to have an YADIRA, LLL PE, DKA, and A-fib with RVR. She was started on an insulin drip, therapeutic dose enoxaparin, Zosyn for possible pneumonia, and diltiazem drip for management of her RVR. Her DKA has resolved, she was switched from diltiazem to amiodarone drip for management of A-fib with RVR; however, her degree of respiratory failure was still concerning and she was intubated 3/23 for management of worsening hypoxia. She had a repeat CTA that demonstrated progression of her PE and RLL collapse concerning for mucous plugging and pneumonia. She was switched from enoxaparin to heparin drip and her antibiotics were broadened to vancomycin and Zosyn. She was on norepinephrine for several days d/t hypotension attributed to sedation & sepsis-related vasoplegia. It appears that she has been doing fairly long SBTs since 3/26, which seemed to be going reasonably well per RT documentation. Primary team has been having issues w/ progressively increasing FiO2 needs. She had a sputum  culture grow Candida & has been on fluconazole since 3/25. She has remained on the insulin drip, presumably to manage the persistent hyperglycemia during her steroid burst (for the presumed COPD exacerbation). She has received a fairly large volume of documented fluids, & was started on diuresis 3/30. She was transferred to [facility] d/t concerns that she will require a tracheostomy secondary to her inability to liberate from the vent.     Preoperative Diagnosis: Perineal and right groin infection     Postoperative Diagnosis: Perineal and right groin necrotizing soft tissue infection     Procedure: Incision and drainage of perineum and right groin     Findings: Infection traveling up the right labia into the right groin.  Final debridement included a right groin defect measuring 18 x 8 x 5 cm and a perineal wound measuring 6 x 2 x 1 cm.       Assessment:      Wound location: Right groin      4/7/25                                                         4/15/25                                                      4/22/25    Wound due to: Surgical Wound   Wound history/plan of care:    Surgical date: 4/2 and 4/8   Service following: General Surgery  Date Negative Pressure Wound Therapy initiated: 04/09/25   Interventions in place: moisture/incontinence management  Is patient s nutritional status compromised? no   If yes, what interventions are in place? N/A  Reason for initiating vac therapy? Presence of co-morbidities, High risk of infections, and Need for accelerated granulation tissue  Which?of?the?following?co-morbidities?apply? Diabetes, Immobility, and Obesity  If diabetic is patient on a diabetic management program? Yes   Is osteomyelitis present in wound? no   If yes what treatments are in place? N/A  Wound due to: Surgical Wound  Wound history/plan of care: I&D 4/2/25 with second debridement on 4/8/25  Wound base: see photo - mix of adipose and granular tissue     Palpation of the wound bed: normal       Drainage:  scant     Description of drainage: serosanguinous     Measurements (length x width x depth, in cm): R groin wound measuring 3.5 cm x 15.5 cm x 3.5 cm     Tunneling: NA - the penrose drain remains in place between the two wounds (groin to labia) no true depth appreciable as healing in well. Still easy to move penrose drain.     Undermining: NA  Periwound skin: Intact      Color: normal and consistent with surrounding tissue      Temperature: normal   Odor: none  Pain: facial expression of distress intermittently during cares; patient declined oral pain meds and had pain from 0 - 1/10 throughout most of the dressing change; 5/10 during dressing removal. Encouraged patient to take oral pain med prior to next dressing change.  Pain interventions prior to dressing change: slow and gentle cares  and distraction, pain meds, soaking dressing  Started white foam due to pain with foam removal  Treatment goal: Drainage control and Infection control/prevention  STATUS: improving  Supplies ordered: supplies stored on unit, discussed with RN, and discussed with patient      Number of foam pieces removed from a wound (excluding foam for bridge) : 1 piece white foam, 1 piece black foam  Verified this matched the number of foam pieces applied last dressing change: Yes  Number of foam pieces packed into wound (excluding foam for bridge) : 1 piece black foam    Pressure Injury Location: Bilateral buttocks (not assessed 4/22)     4/1 4/9  Last photo: 4/9  Wound type: Pressure Injury and Friction     Pressure Injury Stage: 2, present on admission versus IAD  Wound history/plan of care: There was a darker area to the gluteal cleft with some maceration; unclear if it is a developing DTPI but will monitor for changes - 4/9 Skin has sloughed off area over coccyx but remains partial--thickness at this time and measures 2 cm x 0.5 cm    Wound base: buttocks  "healed, dermis in gluteal cleft  Periwound skin: Intact      Color: normal and consistent with surrounding tissue      Temperature: normal   Calmoseptine  Treatment Plan:   Negative pressure wound therapy plan:  Wound location: Right groin   Change Days: Tues/ Fri   Supplies (including all accessories) used: medium Black foam   Cleanse with Vashe prior to replacing NPWT  Suction setting: -125   Methods used: Window paned all periwound skin with vac drape prior to applying sponge, Placed barrier ring into periwound creases to improve seal, and place white foam in base, then black foam, Adapr Ring along medial edge  PRIOR TO REMOVAL: soak dressing, turn off VAC when giving pain meds, use adhesive remover on tape    Staff RN to assess integrity of dressing and ensure suction is set at appropriate level every shift.   Date canister. Chart canister output every shift. Change cannister weekly and PRN if full/occluded     Remove foam dressing and replace with BID normal saline moist gauze dressing if:   -a dressing failure which cannot be repaired within 2 hours   -patient is discharging to home without a home pump   -patient is discharging to a facility outside the local area   -if a dressing is a \"Silver Foam\", remove before Radiation Therapy or MRI     The hospital VAC pump is not to be discharged with the patient. Please disconnect the patient from the machine prior to discharge.  If a home VAC pump has been delivered, connect the home cannister to dressing tubing and the cannister to the home pump, turn on home pump  If the patient is transferring to a nearby facility with a VAC, the tubing can be disconnected, clamp tubing and cover the end with a glove, then can be reconnected if within 2 hours  If transfer will be longer than 2 hours, dressing must be removed and placed with a wet to moist gauze dressing for transfer       R labia - daily and prn with any contamination  Cleanse with Vashe and place 1 fluff of Vashe " moistened gauze into wound bed    Internal FMS - OK to remove if less than 200 mL output in a 24 hour period    Bilateral buttocks wounds: Every 3 days   Cleanse the area with wound cleanser and pat dry.  Apply No sting film barrier to periwound skin.  Cover wound with Sacral Mepilex (#[id number])  Change dressing Q 3 days.  Turn and reposition Q 2hrs side to side only.  Ensure pt has Pablo-cushion while sitting up in the chair.  If not in ICU: Ensure air pump on bed and set to Isoflex.  FYI- If pt has constant incontinent loose stools needing dressing changes Q shift please discontinue the Mepilex dressing and apply criticaid barrier paste BID and PRN.    Orders: Reviewed and Updated    RECOMMEND PRIMARY TEAM ORDER: None, at this time  Education provided: plan of care, wound progress, and Moisture management  Discussed plan of care with: Patient and Nurse; call out to surgeon's office for penrose drain removal - at LTACH versus Surgeon's office  Notify WOC if wound(s) deteriorate.  Nursing to notify the Provider(s) and re-consult the WOC Nurse if new skin concern.    DATA:     Current support surface: Standard  Standard gel mattress (Isoflex)  Containment of urine/stool: Incontinence Protocol and Indwelling catheter  BMI: Body mass index is 30.92 kg/m .   Active diet order: Orders Placed This Encounter      Combination Diet Moderate Consistent Carb (60 g CHO per Meal) Diet; Easy to Chew (level 7); Thin Liquids (level 0)     Output: I/O last 3 completed shifts:  In: 753 [P.O.:750; I.V.:3]  Out: 2125 [Urine:1200; Drains:925]     Labs:   Recent Labs   Lab 04/25/25  0650   HGB 11.4*   WBC 10.6     Pressure injury risk assessment:   Sensory Perception: 3-->slightly limited  Moisture: 3-->occasionally moist  Activity: 2-->chairfast  Mobility: 3-->slightly limited  Nutrition: 3-->adequate  Friction and Shear: 2-->potential problem  Vicente Score: 16    Ivette Garcias MSN RN CWOCN  Pager no longer is use, please contact through  [facility] group: [facility]

## 2025-04-25 NOTE — PLAN OF CARE
RT PROGRESS NOTE     DATA:     CURRENT SETTINGS:             TRACH TYPE / SIZE:  ***             MODE:   ***             FIO2:   ***     ACTION:             THERAPIES:   ***             SUCTION:                           FREQUENCY:   ***                        AMOUNT:   ***                        CONSISTENCY:   ***                        COLOR:   ***             SPONTANEOUS COUGH EFFORT/STRENGTH OF EFFORT (not elicited by suctioning): ***                              WEANING PHASE:   ***                        WEAN MODE:    ***                        WEAN TIME:   ***                        END WEAN REASON:   ***     RESPONSE:             BS:   ***             VITAL SIGNS:   ***             EMOTIONAL NEEDS / CONCERNS:  ***                RISK FOR SELF DECANNULATION:  ***                        RISK DUE TO:  ***                        INTERVENTION/S IN PLACE IS/ARE:  ***       NOTE / PLAN:   ***Goal Outcome Evaluation:

## 2025-04-25 NOTE — PLAN OF CARE
Goal Outcome Evaluation:       Patient alert and oriented. Denied pain/discomfort. No respiratory distress noted. Ate 100% of her dinner.  and 121. Sufficient urine output from noguera. Wound vac intact.Vitals stable. Bed side care done. Plan of care on going.  Problem: Adult Inpatient Plan of Care  Goal: Optimal Comfort and Wellbeing  Outcome: Progressing     Problem: Pain Acute  Goal: Optimal Pain Control and Function  Outcome: Progressing  Intervention: Optimize Psychosocial Wellbeing  Recent Flowsheet Documentation  Taken 4/24/2025 2300 by Kati Gimenez RN  Diversional Activities: television  Intervention: Prevent or Manage Pain  Recent Flowsheet Documentation  Taken 4/24/2025 2300 by Kati Gimenez, RN  Sensory Stimulation Regulation: quiet environment promoted  Bowel Elimination Promotion: adequate fluid intake promoted  Medication Review/Management: medications reviewed

## 2025-04-26 ENCOUNTER — APPOINTMENT (OUTPATIENT)
Dept: OCCUPATIONAL THERAPY | Facility: CLINIC | Age: 60
DRG: 602 | End: 2025-04-26
Attending: INTERNAL MEDICINE
Payer: COMMERCIAL

## 2025-04-26 ENCOUNTER — APPOINTMENT (OUTPATIENT)
Dept: PHYSICAL THERAPY | Facility: CLINIC | Age: 60
DRG: 602 | End: 2025-04-26
Attending: INTERNAL MEDICINE
Payer: COMMERCIAL

## 2025-04-26 LAB
CREAT SERPL-MCNC: 1.25 MG/DL (ref 0.51–0.95)
EGFRCR SERPLBLD CKD-EPI 2021: 49 ML/MIN/1.73M2
GLUCOSE BLDC GLUCOMTR-MCNC: 112 MG/DL (ref 70–99)
GLUCOSE BLDC GLUCOMTR-MCNC: 168 MG/DL (ref 70–99)
GLUCOSE BLDC GLUCOMTR-MCNC: 169 MG/DL (ref 70–99)
GLUCOSE BLDC GLUCOMTR-MCNC: 178 MG/DL (ref 70–99)
PLATELET # BLD AUTO: 410 10E3/UL (ref 150–450)

## 2025-04-26 PROCEDURE — 36415 COLL VENOUS BLD VENIPUNCTURE: CPT | Performed by: STUDENT IN AN ORGANIZED HEALTH CARE EDUCATION/TRAINING PROGRAM

## 2025-04-26 PROCEDURE — 82565 ASSAY OF CREATININE: CPT | Performed by: STUDENT IN AN ORGANIZED HEALTH CARE EDUCATION/TRAINING PROGRAM

## 2025-04-26 PROCEDURE — 97530 THERAPEUTIC ACTIVITIES: CPT | Mod: GP

## 2025-04-26 PROCEDURE — 99232 SBSQ HOSP IP/OBS MODERATE 35: CPT | Performed by: INTERNAL MEDICINE

## 2025-04-26 PROCEDURE — 999N000157 HC STATISTIC RCP TIME EA 10 MIN

## 2025-04-26 PROCEDURE — 85049 AUTOMATED PLATELET COUNT: CPT | Performed by: STUDENT IN AN ORGANIZED HEALTH CARE EDUCATION/TRAINING PROGRAM

## 2025-04-26 PROCEDURE — 97116 GAIT TRAINING THERAPY: CPT | Mod: GP

## 2025-04-26 PROCEDURE — 94799 UNLISTED PULMONARY SVC/PX: CPT

## 2025-04-26 PROCEDURE — 120N000017 HC R&B RESPIRATORY CARE

## 2025-04-26 PROCEDURE — 97535 SELF CARE MNGMENT TRAINING: CPT | Mod: GO | Performed by: OCCUPATIONAL THERAPIST

## 2025-04-26 PROCEDURE — 250N000013 HC RX MED GY IP 250 OP 250 PS 637: Performed by: STUDENT IN AN ORGANIZED HEALTH CARE EDUCATION/TRAINING PROGRAM

## 2025-04-26 PROCEDURE — 250N000013 HC RX MED GY IP 250 OP 250 PS 637: Performed by: INTERNAL MEDICINE

## 2025-04-26 RX ADMIN — AMOXICILLIN 500 MG: 500 CAPSULE ORAL at 21:28

## 2025-04-26 RX ADMIN — APIXABAN 5 MG: 5 TABLET, FILM COATED ORAL at 21:28

## 2025-04-26 RX ADMIN — MICONAZOLE NITRATE: 2 CREAM TOPICAL at 21:30

## 2025-04-26 RX ADMIN — INSULIN ASPART 2 UNITS: 100 INJECTION, SOLUTION INTRAVENOUS; SUBCUTANEOUS at 08:34

## 2025-04-26 RX ADMIN — APIXABAN 5 MG: 5 TABLET, FILM COATED ORAL at 09:10

## 2025-04-26 RX ADMIN — ANORECTAL OINTMENT: 15.7; .44; 24; 20.6 OINTMENT TOPICAL at 09:10

## 2025-04-26 RX ADMIN — INSULIN ASPART 18 UNITS: 100 INJECTION, SOLUTION INTRAVENOUS; SUBCUTANEOUS at 09:06

## 2025-04-26 RX ADMIN — ANORECTAL OINTMENT: 15.7; .44; 24; 20.6 OINTMENT TOPICAL at 13:51

## 2025-04-26 RX ADMIN — ATORVASTATIN CALCIUM 40 MG: 40 TABLET, FILM COATED ORAL at 21:28

## 2025-04-26 RX ADMIN — AMOXICILLIN 500 MG: 500 CAPSULE ORAL at 06:19

## 2025-04-26 RX ADMIN — Medication 1 TABLET: at 09:10

## 2025-04-26 RX ADMIN — ANORECTAL OINTMENT: 15.7; .44; 24; 20.6 OINTMENT TOPICAL at 21:33

## 2025-04-26 RX ADMIN — INSULIN ASPART 2 UNITS: 100 INJECTION, SOLUTION INTRAVENOUS; SUBCUTANEOUS at 17:20

## 2025-04-26 RX ADMIN — INSULIN ASPART 14 UNITS: 100 INJECTION, SOLUTION INTRAVENOUS; SUBCUTANEOUS at 17:47

## 2025-04-26 RX ADMIN — AMLODIPINE BESYLATE 5 MG: 5 TABLET ORAL at 09:12

## 2025-04-26 RX ADMIN — AMOXICILLIN 500 MG: 500 CAPSULE ORAL at 13:51

## 2025-04-26 RX ADMIN — MICONAZOLE NITRATE: 2 CREAM TOPICAL at 09:11

## 2025-04-26 RX ADMIN — INSULIN ASPART 10 UNITS: 100 INJECTION, SOLUTION INTRAVENOUS; SUBCUTANEOUS at 12:57

## 2025-04-26 RX ADMIN — INSULIN GLARGINE 32 UNITS: 100 INJECTION, SOLUTION SUBCUTANEOUS at 21:27

## 2025-04-26 RX ADMIN — UMECLIDINIUM BROMIDE AND VILANTEROL TRIFENATATE 1 PUFF: 62.5; 25 POWDER RESPIRATORY (INHALATION) at 09:11

## 2025-04-26 ASSESSMENT — ACTIVITIES OF DAILY LIVING (ADL)
ADLS_ACUITY_SCORE: 63
ADLS_ACUITY_SCORE: 64
ADLS_ACUITY_SCORE: 62
ADLS_ACUITY_SCORE: 63
ADLS_ACUITY_SCORE: 64
ADLS_ACUITY_SCORE: 62
ADLS_ACUITY_SCORE: 64
ADLS_ACUITY_SCORE: 63
ADLS_ACUITY_SCORE: 64
ADLS_ACUITY_SCORE: 63
ADLS_ACUITY_SCORE: 64
ADLS_ACUITY_SCORE: 62
ADLS_ACUITY_SCORE: 62
ADLS_ACUITY_SCORE: 63
ADLS_ACUITY_SCORE: 62
ADLS_ACUITY_SCORE: 62
ADLS_ACUITY_SCORE: 64
ADLS_ACUITY_SCORE: 63

## 2025-04-26 ASSESSMENT — VISUAL ACUITY: OU: NOT TESTED

## 2025-04-26 NOTE — PLAN OF CARE
Problem: Pain Acute  Goal: Optimal Pain Control and Function  Outcome: Progressing     Problem: Wound  Goal: Optimal Functional Ability  Outcome: Progressing  Goal: Improved Oral Intake  Outcome: Progressing  Goal: Optimal Pain Control and Function  Outcome: Progressing   Goal Outcome Evaluation:    Patient has not reported pain since the start of this shift, alert and calm so far this shift (1245), had been ambulated in the room to the bathroom for hygiene cares by the occupational therapist, ambulated outside the room in the hallways by the physical therapist also, BG was 169 at 0808, 112 at 1218. Blood pressure was 135/80, temperature 98.0 (axillary) at 0759, was 138/70 at 0901 (prior to morning medications), 102/66 at 1104 (after morning medications), ate 100 % of breakfast and 25 % of lunch. Platelet count 410, creatinine 1.25, GFR 49

## 2025-04-26 NOTE — PLAN OF CARE
Goal Outcome Evaluation:         Pt VSS. Alert and oriented x 4, can be forgetful.  Complains of pain 0/10.  Denies nausea, dizziness, shortness of breath and lightheadedness.  On nasal cannula/day and BIPAP at night. Assist of 1 with walker and GB. Easy to chew with thin liquid. Incontinent of bowel and has a noguera catheter.  No PRN was given. Uses call light appropriately.  Continue to monitor.     Alida Kraus RN  [facility], [facility]

## 2025-04-26 NOTE — TREATMENT PLAN
RESPIRATORY CARE NOTE    Pt on 2 lpm nc, bs clear and diminished, strong dry cough.  Did Flutter valve x1: 2 cycles 10 rep  IS x1: 2 cycles of 10 rep 1500-2000ml    Cont monitoring

## 2025-04-26 NOTE — PLAN OF CARE
Problem: Adult Inpatient Plan of Care  Goal: Optimal Comfort and Wellbeing  Outcome: Progressing   Goal Outcome Evaluation:       Patient A&O, able to make needs known. Denies pain and discomfort. PIV intact in Lt arm, wound vacc working well on Rt groin. Dressing on Rt labia intact. With O2 via face mask at 3LPM.  No complaints throughout the shift. All needs attended.

## 2025-04-26 NOTE — PROGRESS NOTES
RESPIRATORY CARE NOTE:    Pt remained off BiPAP for the night and changed to Oxymask 3 L due to occasional Desat on NC, Pt tolerated it well.     /72 (BP Location: Left arm)   Pulse 90   Temp 98.7  F (37.1  C) (Axillary)   Resp 20   Wt 84.3 kg (185 lb 12.8 oz)   SpO2 96%   BMI 30.92 kg/m      RT will continue to follow and titrate O2 as tolerated.

## 2025-04-26 NOTE — PROGRESS NOTES
[facility]    Medicine Progress Note - Hospitalist Service    Date of Admission:  4/11/2025    Assessment & Plan   PMHx: Marly Cannon is a 59 year old woman with history of intellectual disability & memory issues, COPD, untreated ADEEL, tobacco use d/o, HTN, pAFib, & poorly controlled T2DM s/p previous hospitalization for DKA (3/18 - 3/20).  CC: Acute onset dyspnea  Acute Care Hospital Course  HPI: Presented second time to hospital in [place] on 3/22/2025 with complaints of dyspnea, hyperglycemia, and chest pain. She was found to have an YADIRA, LLL PE, DKA, and A-fib with RVR. She was started on an insulin drip, therapeutic dose enoxaparin, Zosyn for possible pneumonia, and diltiazem drip for management of her RVR. Her DKA resolved, she was switched from diltiazem to amiodarone drip for management of A-fib with RVR; however, her degree of respiratory failure was still concerning and she was intubated 3/23 for management of worsening hypoxia.   She had a repeat CTA that demonstrated progression of her PE and RLL collapse concerning for mucous plugging and pneumonia.   She was switched from enoxaparin to heparin drip and her antibiotics were broadened to vancomycin and Zosyn. She was on norepinephrine for several days d/t hypotension attributed to sedation & sepsis-related vasoplegia. It appears that she has been doing fairly long SBTs since 3/26, which seemed to be going reasonably well per RT documentation. Primary team has been having issues w/ progressively increasing FiO2 needs. She had a sputum culture grow Candida & has been on fluconazole since 3/25. Transferred to [facility] d/t concerns that she might require a tracheostomy secondary to her inability to liberate from the vent.   At [facility] patient was admitted to the ICU on a ventilator, successfully extubated 4/1 and downgraded to floor status 4/3.  Infectious disease consulted and continued patient on Zosyn course to be completed through  4/17/2025.  General surgery consulted for right keiry/perineal abscess and performed incision and drainage with Penrose drain placement 3/31 with subsequent repeat I&D 4/2 and 4/8.  Surgery requests wound VAC be changed by wound care Tuesdays and Thursdays.     LTACH Course  She transferred from [facility] to [place] for wound cares, therapies, and complex medical care.   4/12-4/14: Needing BiPAP overnight.  4/17: successfully weaned off NC O2 during the day  4/23- wound is closing well - [facility] plan to remove penrose drain on 4/29/2025. Consider transfer to [facility] for ongoing therapy and wound care.    DVT Prophylaxis: Apixaban 5 mg po bid    Barriers to Discharge:  Large right groin wound requiring frequent cares, wound vac, IV abx, nocturnal BIPAP    Active LTACH PROBLEMS  Acute on Chronic Hypoxic Respiratory Failure  VAP, PTA - resolved  COPD  ADEEL  - Completed zosyn 4/17  - inhalers as needed  - weaned off HFNC, weaned off NC during the day. Continues Bipap at night here. Not likely to use at home.  - RT following    Perineal Abscess  Condyloma Acuminata  s/p acyclovir and I&D x3  - zosyn through 4/17  - rectal tube discontinued  - noguera for wound protection  - VAC in place  - Continue wound care per WO nurse    PE, Acute, segmental, subsegmental  - On apixaban     Reactive thrombocytosis:  Platelets historically in normal range (reviewed last 3 years)  Rising over last 10 days to 700,000, now dropping  Likely  reactive - reviewed flow chart for acute thrombocytosis, posted in note  Normal peripheral smear, liver enzymes    Seborrheic dermatitis:  Ketoconazole cream to face    T2DM  Poorly controlled with A1c 12.7. Used to be on insulin pump. No longer on it due to intellectual delay.  - BG labile earlier during admission, now stabilizing   - Increase Lantus 30 unit(s)->32 units qhs  - I:C of 1:5 -> I:C 1:4 with meals  - ISS high    CKD3b  - Creatinine stable. Monitor    Elevated Hgb in  past  Historically high  Consistent with smoking (high CO), untreated ADEEL    History of intellectual delay: supportive care.          Diet: Snacks/Supplements Adult: Expedite Cup; With Meals  Snacks/Supplements Adult: Magic Cup; With Meals  Snacks/Supplements Adult: Gelatein 20 (sugar-free); With Meals  Combination Diet Moderate Consistent Carb (60 g CHO per Meal) Diet; Easy to Chew (level 7); Thin Liquids (level 0)    Landrum Catheter: PRESENT, indication: Wound deterioration and failed external collection device  Lines: None     Cardiac Monitoring: None  Code Status: Full Code      Clinically Significant Risk Factors               # Hypoalbuminemia: Lowest albumin = 2.9 g/dL at 4/13/2025  2:14 AM, will monitor as appropriate     # Hypertension: Noted on problem list           # DMII: A1C = 12.7 % (Ref range: <5.7 %) within past 6 months       # COPD: noted on problem list        Social Drivers of Health    Tobacco Use: Medium Risk (4/2/2025)    Patient History     Smoking Tobacco Use: Former     Smokeless Tobacco Use: Never     Passive Exposure: Current   Physical Activity: Unknown (5/16/2024)    Exercise Vital Sign     Days of Exercise per Week: 0 days   Social Connections: Unknown (5/16/2024)    Social Connection and Isolation Panel [NHANES]     Frequency of Social Gatherings with Friends and Family: More than three times a week          Disposition Plan     Medically Ready for Discharge: Anticipated in 5+ Days             Andrez Mueller MD  Hospitalist Service  LTACH  Securely message with [employer] (more info)  Text page via [employer] Paging/Directory   ______________________________________________________________________    Interval History   Patient slept well, denies pain.    Physical Exam   Vital Signs: Temp: 98.7  F (37.1  C) Temp src: Axillary BP: 130/72 Pulse: 88   Resp: 18 SpO2: 94 % O2 Device: Oxymask Oxygen Delivery: 3 LPM  Weight: 183 lbs 9.6 oz    Vitals and nursing note reviewed.   Constitutional:  Well  developed, obese adult female, in no acute distress   HEENT:      Eyes: Vision grossly intact, Conjunctivae clear without bleeding and without jaundice.      Head: Normocephalic and atraumatic.      Nares: without obstruction, bleeding or discharge.  Skin: Skin is warm and well perfused. Face with areas of acne, some infected. Also wide-spread seborrheic dermatitis  Pulmonary: Lungs clear to auscultation bilaterally  Cardiac: RRR, no clinically significant murmur, gallop, rub  GI: Soft, NT, BS+  Neurological: Face is symmetric. Speech fluent, clear, appropriate. Oriented to self, time and place. Moves all extremities equally, 5/5 strength bilaterally.     Medical Decision Making       25 MINUTES SPENT BY ME on the date of service doing chart review, history, exam, documentation & further activities per the note.      Data   ------------------------- PAST 24 HR DATA REVIEWED -----------------------------------------------    I have personally reviewed the following data over the past 24 hrs:    N/A  \   N/A   / 410     N/A N/A N/A /  169 (H)   N/A N/A 1.25 (H) \       Imaging results reviewed over the past 24 hrs:   No results found for this or any previous visit (from the past 24 hours).

## 2025-04-27 PROBLEM — R41.3 MEMORY DISTURBANCE: Status: ACTIVE | Noted: 2020-02-13

## 2025-04-27 PROBLEM — J96.22 ACUTE ON CHRONIC RESPIRATORY FAILURE WITH HYPOXIA AND HYPERCAPNIA (H): Status: ACTIVE | Noted: 2025-03-22

## 2025-04-27 PROBLEM — F79 INTELLECTUAL DISABILITY: Status: ACTIVE | Noted: 2017-08-01

## 2025-04-27 PROBLEM — I10 ESSENTIAL HYPERTENSION: Status: ACTIVE | Noted: 2019-05-28

## 2025-04-27 PROBLEM — E78.5 HYPERLIPIDEMIA, UNSPECIFIED HYPERLIPIDEMIA TYPE: Status: ACTIVE | Noted: 2019-05-28

## 2025-04-27 LAB
GLUCOSE BLDC GLUCOMTR-MCNC: 125 MG/DL (ref 70–99)
GLUCOSE BLDC GLUCOMTR-MCNC: 207 MG/DL (ref 70–99)
GLUCOSE BLDC GLUCOMTR-MCNC: 297 MG/DL (ref 70–99)
GLUCOSE BLDC GLUCOMTR-MCNC: 349 MG/DL (ref 70–99)

## 2025-04-27 PROCEDURE — 250N000013 HC RX MED GY IP 250 OP 250 PS 637: Performed by: STUDENT IN AN ORGANIZED HEALTH CARE EDUCATION/TRAINING PROGRAM

## 2025-04-27 PROCEDURE — 999N000157 HC STATISTIC RCP TIME EA 10 MIN

## 2025-04-27 PROCEDURE — 250N000013 HC RX MED GY IP 250 OP 250 PS 637: Performed by: INTERNAL MEDICINE

## 2025-04-27 PROCEDURE — 120N000017 HC R&B RESPIRATORY CARE

## 2025-04-27 PROCEDURE — 99233 SBSQ HOSP IP/OBS HIGH 50: CPT | Performed by: INTERNAL MEDICINE

## 2025-04-27 PROCEDURE — 250N000012 HC RX MED GY IP 250 OP 636 PS 637: Performed by: STUDENT IN AN ORGANIZED HEALTH CARE EDUCATION/TRAINING PROGRAM

## 2025-04-27 RX ADMIN — INSULIN ASPART 9 UNITS: 100 INJECTION, SOLUTION INTRAVENOUS; SUBCUTANEOUS at 12:36

## 2025-04-27 RX ADMIN — Medication 1 TABLET: at 08:33

## 2025-04-27 RX ADMIN — ATORVASTATIN CALCIUM 40 MG: 40 TABLET, FILM COATED ORAL at 20:49

## 2025-04-27 RX ADMIN — UMECLIDINIUM BROMIDE AND VILANTEROL TRIFENATATE 1 PUFF: 62.5; 25 POWDER RESPIRATORY (INHALATION) at 08:34

## 2025-04-27 RX ADMIN — APIXABAN 5 MG: 5 TABLET, FILM COATED ORAL at 20:49

## 2025-04-27 RX ADMIN — ANORECTAL OINTMENT: 15.7; .44; 24; 20.6 OINTMENT TOPICAL at 08:34

## 2025-04-27 RX ADMIN — AMLODIPINE BESYLATE 5 MG: 5 TABLET ORAL at 08:33

## 2025-04-27 RX ADMIN — MICONAZOLE NITRATE: 2 CREAM TOPICAL at 20:49

## 2025-04-27 RX ADMIN — INSULIN ASPART 17 UNITS: 100 INJECTION, SOLUTION INTRAVENOUS; SUBCUTANEOUS at 17:49

## 2025-04-27 RX ADMIN — ANORECTAL OINTMENT: 15.7; .44; 24; 20.6 OINTMENT TOPICAL at 20:48

## 2025-04-27 RX ADMIN — INSULIN GLARGINE 32 UNITS: 100 INJECTION, SOLUTION SUBCUTANEOUS at 21:38

## 2025-04-27 RX ADMIN — INSULIN ASPART 24 UNITS: 100 INJECTION, SOLUTION INTRAVENOUS; SUBCUTANEOUS at 13:34

## 2025-04-27 RX ADMIN — INSULIN ASPART 7 UNITS: 100 INJECTION, SOLUTION INTRAVENOUS; SUBCUTANEOUS at 17:28

## 2025-04-27 RX ADMIN — MICONAZOLE NITRATE: 2 CREAM TOPICAL at 08:35

## 2025-04-27 RX ADMIN — INSULIN ASPART 3 UNITS: 100 INJECTION, SOLUTION INTRAVENOUS; SUBCUTANEOUS at 08:31

## 2025-04-27 RX ADMIN — APIXABAN 5 MG: 5 TABLET, FILM COATED ORAL at 08:33

## 2025-04-27 RX ADMIN — INSULIN ASPART 18 UNITS: 100 INJECTION, SOLUTION INTRAVENOUS; SUBCUTANEOUS at 09:12

## 2025-04-27 RX ADMIN — ANORECTAL OINTMENT: 15.7; .44; 24; 20.6 OINTMENT TOPICAL at 13:36

## 2025-04-27 ASSESSMENT — ACTIVITIES OF DAILY LIVING (ADL)
ADLS_ACUITY_SCORE: 58
ADLS_ACUITY_SCORE: 61
ADLS_ACUITY_SCORE: 63
ADLS_ACUITY_SCORE: 58
ADLS_ACUITY_SCORE: 61
ADLS_ACUITY_SCORE: 62
ADLS_ACUITY_SCORE: 58
ADLS_ACUITY_SCORE: 61
ADLS_ACUITY_SCORE: 63
ADLS_ACUITY_SCORE: 58
ADLS_ACUITY_SCORE: 58
ADLS_ACUITY_SCORE: 62
ADLS_ACUITY_SCORE: 62
ADLS_ACUITY_SCORE: 61
ADLS_ACUITY_SCORE: 63
ADLS_ACUITY_SCORE: 62
ADLS_ACUITY_SCORE: 61
ADLS_ACUITY_SCORE: 62
ADLS_ACUITY_SCORE: 61
ADLS_ACUITY_SCORE: 63
ADLS_ACUITY_SCORE: 62

## 2025-04-27 ASSESSMENT — VISUAL ACUITY: OU: NOT TESTED

## 2025-04-27 NOTE — PROGRESS NOTES
[facility]    Medicine Progress Note - Hospitalist Service    Date of Admission:  4/11/2025  Brief Course Prior to [facility]:  59 up F with h/o intellectual disability & memory issues, COPD, untreated ADEEL, tobacco use d/o, HTN, pAFib, & poorly controlled T2DM s/p previous hospitalization for DKA (3/18 - 3/20) who presented second time to hospital in [place] on 3/22/2025 with complaints of dyspnea, hyperglycemia, and chest pain. She was found to have an YADIRA, LLL PE, DKA, and A-fib with RVR. She was started on an insulin drip, therapeutic dose enoxaparin, Zosyn for possible pneumonia, and diltiazem drip for management of her RVR. Her DKA resolved, she was switched from diltiazem to amiodarone drip for management of A-fib with RVR; however, her degree of respiratory failure was still concerning and she was intubated 3/23 for management of worsening hypoxia.   She had a repeat CTA that demonstrated progression of her PE and RLL collapse concerning for mucous plugging and pneumonia.   She was switched from enoxaparin to heparin drip and her antibiotics were broadened to vancomycin and Zosyn. She was on norepinephrine for several days d/t hypotension attributed to sedation & sepsis-related vasoplegia. It appears that she has been doing fairly long SBTs since 3/26, which seemed to be going reasonably well per RT documentation. Primary team has been having issues w/ progressively increasing FiO2 needs. She had a sputum culture grow Candida & has been on fluconazole since 3/25. Transferred to [facility] d/t concerns that she might require a tracheostomy secondary to her inability to liberate from the vent.   At [facility] patient was admitted to the ICU on a ventilator, successfully extubated 4/1 and downgraded to floor status 4/3.  Infectious disease consulted and continued patient on Zosyn course to be completed through 4/17/2025.  General surgery consulted for right keiry/perineal abscess and performed incision and  drainage with Penrose drain placement 3/31 with subsequent repeat I&D 4/2 and 4/8.  Surgery requests wound VAC be changed by wound care Tuesdays and Thursdays.     LTACH Course:  She transferred from [facility] to [facility] for wound cares, therapies, and complex medical care.   4/12-4/14: Needing BiPAP overnight.  4/15-4/20: successfully weaned off NC O2 during the day, completed zosyn 4/17 4/21-4/27- wound is closing well - [facility] plan to remove penrose drain on 4/29/2025. [facility] and wound care.    BARRIERS TO DISCHARGE (why care is unable to be provided at a lower level of care):    Large right groin wound requiring frequent cares, wound vac, IV abx, nocturnal BIPAP     Things to follow up on:  -unclear what will be best outpatient diabetes regimen as outpatient due to intellectual disability  -check ABG 4/28 after being off nocturnal BIPAP several days    Assessment & Plan   Acute on Chronic Hypoxic Respiratory Failure  VAP, PTA - resolved  COPD  ADEEL  - Completed zosyn 4/17  - inhalers as needed  - weaned off HFNC, weaned off NC during the day. Holding Bipap at night for several nights and will check ABG 4/28. Not likely to use at home.  - RT following     Perineal Abscess  Condyloma Acuminata  s/p acyclovir and I&D x3  - zosyn completed 4/17  - rectal tube discontinued  - noguera for wound protection  - VAC in place  - Continue wound care per WOC nurse     PE, Acute, segmental, subsegmental  - On apixaban     Seborrheic dermatitis:  Ketoconazole cream to face     T2DM  Poorly controlled as outpatient with A1c 12.7. Used to be on insulin pump. No longer on it due to intellectual delay.  - BG labile earlier during admission  - Increase Lantus 30 unit(s)->32 units qhs  - I:C of 1:5 -> I:C 1:4 with meals  - ISS high     CKD3b  - Creatinine stable. Monitor     Elevated Hgb in past  Historically high  Consistent with smoking (high CO), untreated ADEEL     History of  intellectual delay: supportive care.    PROBLEMS MANAGED During this or previous Encounter Now Stable but Monitoring:    Reactive thrombocytosis:  Platelets historically in normal range (reviewed last 3 years)  Peaked at 700,000, now resolved  Likely  reactive - reviewed flow chart for acute thrombocytosis, posted in note  Normal peripheral smear, liver enzymes           Diet: Snacks/Supplements Adult: Expedite Cup; With Meals  Snacks/Supplements Adult: Magic Cup; With Meals  Snacks/Supplements Adult: Gelatein 20 (sugar-free); With Meals  Combination Diet Moderate Consistent Carb (60 g CHO per Meal) Diet; Easy to Chew (level 7); Thin Liquids (level 0)    DVT Prophylaxis: DOAC  Landrum Catheter: PRESENT, indication: Wound deterioration and failed external collection device  Lines: None     Cardiac Monitoring: None  Code Status: Full Code      Clinically Significant Risk Factors               # Hypoalbuminemia: Lowest albumin = 2.9 g/dL at 4/13/2025  2:14 AM, will monitor as appropriate     # Hypertension: Noted on problem list           # DMII: A1C = 12.7 % (Ref range: <5.7 %) within past 6 months       # COPD: noted on problem list        Social Drivers of Health    Tobacco Use: Medium Risk (4/2/2025)    Patient History     Smoking Tobacco Use: Former     Smokeless Tobacco Use: Never     Passive Exposure: Current   Physical Activity: Unknown (5/16/2024)    Exercise Vital Sign     Days of Exercise per Week: 0 days   Social Connections: Unknown (5/16/2024)    Social Connection and Isolation Panel [NHANES]     Frequency of Social Gatherings with Friends and Family: More than three times a week          Disposition Plan     Medically Ready for Discharge: Anticipated in 5+ Days         Tony Joshua MD  Hospitalist Service  LTACH  Securely message with [employer] (more info)  Text page via [employer] Paging/Directory   ______________________________________________________________________    Interval History   No new complaints,  glucoses labile    Physical Exam   Vital Signs: Temp: 97.9  F (36.6  C) Temp src: Axillary BP: 134/71 Pulse: 90   Resp: 20 SpO2: 98 % O2 Device: Oxymask Oxygen Delivery: 3 LPM  Weight: 183 lbs 10.29 oz    General Appearance: Older F in NAD  Respiratory:  rhonchi bilaterally  Cardiovascular: RRR S1S2  GI: +BS, soft, NT  Skin: Face with areas of somewhat inflamed acne. Also wide-spread seborrheic dermatitis.  Large right groin wound - see WOC note - dressing in place  Neuro: Alert, generally nonfocal on motor and sensory testing      Medical Decision Making       50 MINUTES SPENT BY ME on the date of service doing chart review, history, exam, documentation & further activities per the note.      Data         Imaging results reviewed over the past 24 hrs:   No results found for this or any previous visit (from the past 24 hours).

## 2025-04-27 NOTE — PLAN OF CARE
Goal Outcome Evaluation:       Pt VSS. Alert and oriented x 4 can also be forgetful.  Complains of pain 0/10.  Denies nausea, dizziness, shortness of breath and lightheadedness.  On nasal cannula. Assist of 1 with walker and gb.  Adequate intake and output. On regular and thin liquid. Last BM: 4/26/25. Continent of bowel and has a noguera catheter.  Skin looks flushed on her face.  No PRN was given. Uses call light appropriately.    Continue to monitor.     Alida Kraus RN  [facility], [facility]

## 2025-04-27 NOTE — PLAN OF CARE
Problem: Pain Acute  Goal: Optimal Pain Control and Function  Intervention: Prevent or Manage Pain  Recent Flowsheet Documentation  Taken 4/27/2025 0845 by Justino Healy RN  Sensory Stimulation Regulation: television on  Bowel Elimination Promotion: adequate fluid intake promoted  Medication Review/Management: medications reviewed     Problem: Wound  Goal: Optimal Wound Healing  Outcome: Progressing     Problem: Infection  Goal: Absence of Infection Signs and Symptoms  Outcome: Progressing  Intervention: Prevent or Manage Infection  Recent Flowsheet Documentation  Taken 4/27/2025 0845 by Justino Healy RN  Infection Management: aseptic technique maintained  Isolation Precautions: protective environment maintained   Goal Outcome Evaluation:         Alert and cooperating with cares.Denies pain. Ate 100% in both meals.Blood sugar is 207 before breakfast and 349 before lunch.Correction and carb counting Insulin is given.  Landrum catheter and wound Vac is patent.

## 2025-04-27 NOTE — PLAN OF CARE
Pt remained off the BiPAP tonight per order. Placed 3 L Oxymask due to mouth breathing and tolerating well.   Per order; Hold BiPAP 4/25-4/28  ABG 4/28   Blood pressure 137/65, pulse 88, temperature 98.5  F (36.9  C), temperature source Oral, resp. rate 24, weight 83.3 kg (183 lb 9.6 oz), SpO2 94%.  Continue current care plan.

## 2025-04-28 ENCOUNTER — APPOINTMENT (OUTPATIENT)
Dept: OCCUPATIONAL THERAPY | Facility: CLINIC | Age: 60
DRG: 602 | End: 2025-04-28
Attending: INTERNAL MEDICINE
Payer: COMMERCIAL

## 2025-04-28 ENCOUNTER — APPOINTMENT (OUTPATIENT)
Dept: PHYSICAL THERAPY | Facility: CLINIC | Age: 60
DRG: 602 | End: 2025-04-28
Attending: INTERNAL MEDICINE
Payer: COMMERCIAL

## 2025-04-28 LAB
BASE EXCESS BLDA CALC-SCNC: 4.1 MMOL/L (ref -3–3)
CA-I BLD-MCNC: 5.3 MG/DL (ref 4.4–5.2)
GLUCOSE BLD-MCNC: 194 MG/DL (ref 70–99)
GLUCOSE BLDC GLUCOMTR-MCNC: 100 MG/DL (ref 70–99)
GLUCOSE BLDC GLUCOMTR-MCNC: 103 MG/DL (ref 70–99)
GLUCOSE BLDC GLUCOMTR-MCNC: 170 MG/DL (ref 70–99)
GLUCOSE BLDC GLUCOMTR-MCNC: 202 MG/DL (ref 70–99)
HCO3 BLDA-SCNC: 28 MMOL/L (ref 21–28)
HGB BLD-MCNC: 11.6 G/DL (ref 11.7–15.7)
LACTATE BLD-SCNC: 1 MMOL/L (ref 0.7–2)
OXYHGB MFR BLDA: 94 % (ref 92–100)
PCO2 BLDA: 43 MM HG (ref 35–45)
PH BLDA: 7.44 [PH] (ref 7.35–7.45)
PO2 BLDA: 72 MM HG (ref 80–105)
POTASSIUM BLD-SCNC: 4.1 MMOL/L (ref 3.4–5.3)
SAO2 % BLDA: 96 % (ref 96–97)
SODIUM BLD-SCNC: 139 MMOL/L (ref 135–145)

## 2025-04-28 PROCEDURE — 120N000017 HC R&B RESPIRATORY CARE

## 2025-04-28 PROCEDURE — 97535 SELF CARE MNGMENT TRAINING: CPT | Mod: GO | Performed by: OCCUPATIONAL THERAPIST

## 2025-04-28 PROCEDURE — 97110 THERAPEUTIC EXERCISES: CPT | Mod: GP | Performed by: PHYSICAL THERAPIST

## 2025-04-28 PROCEDURE — 82947 ASSAY GLUCOSE BLOOD QUANT: CPT

## 2025-04-28 PROCEDURE — 99233 SBSQ HOSP IP/OBS HIGH 50: CPT | Performed by: STUDENT IN AN ORGANIZED HEALTH CARE EDUCATION/TRAINING PROGRAM

## 2025-04-28 PROCEDURE — 250N000013 HC RX MED GY IP 250 OP 250 PS 637: Performed by: INTERNAL MEDICINE

## 2025-04-28 PROCEDURE — 36600 WITHDRAWAL OF ARTERIAL BLOOD: CPT

## 2025-04-28 PROCEDURE — 97110 THERAPEUTIC EXERCISES: CPT | Mod: GO | Performed by: OCCUPATIONAL THERAPIST

## 2025-04-28 PROCEDURE — 250N000013 HC RX MED GY IP 250 OP 250 PS 637: Performed by: STUDENT IN AN ORGANIZED HEALTH CARE EDUCATION/TRAINING PROGRAM

## 2025-04-28 PROCEDURE — 999N000123 HC STATISTIC OXYGEN O2DAILY TECH TIME

## 2025-04-28 PROCEDURE — 94799 UNLISTED PULMONARY SVC/PX: CPT

## 2025-04-28 PROCEDURE — 99232 SBSQ HOSP IP/OBS MODERATE 35: CPT | Performed by: NURSE PRACTITIONER

## 2025-04-28 PROCEDURE — 97530 THERAPEUTIC ACTIVITIES: CPT | Mod: GP | Performed by: PHYSICAL THERAPIST

## 2025-04-28 PROCEDURE — 999N000157 HC STATISTIC RCP TIME EA 10 MIN

## 2025-04-28 PROCEDURE — 97116 GAIT TRAINING THERAPY: CPT | Mod: GP | Performed by: PHYSICAL THERAPIST

## 2025-04-28 RX ADMIN — INSULIN ASPART 2 UNITS: 100 INJECTION, SOLUTION INTRAVENOUS; SUBCUTANEOUS at 12:53

## 2025-04-28 RX ADMIN — ANORECTAL OINTMENT: 15.7; .44; 24; 20.6 OINTMENT TOPICAL at 14:36

## 2025-04-28 RX ADMIN — INSULIN ASPART 24 UNITS: 100 INJECTION, SOLUTION INTRAVENOUS; SUBCUTANEOUS at 12:54

## 2025-04-28 RX ADMIN — MICONAZOLE NITRATE: 2 CREAM TOPICAL at 20:51

## 2025-04-28 RX ADMIN — INSULIN ASPART 16 UNITS: 100 INJECTION, SOLUTION INTRAVENOUS; SUBCUTANEOUS at 08:47

## 2025-04-28 RX ADMIN — Medication 1 TABLET: at 08:47

## 2025-04-28 RX ADMIN — INSULIN ASPART 16 UNITS: 100 INJECTION, SOLUTION INTRAVENOUS; SUBCUTANEOUS at 17:27

## 2025-04-28 RX ADMIN — APIXABAN 5 MG: 5 TABLET, FILM COATED ORAL at 08:47

## 2025-04-28 RX ADMIN — INSULIN ASPART 3 UNITS: 100 INJECTION, SOLUTION INTRAVENOUS; SUBCUTANEOUS at 08:45

## 2025-04-28 RX ADMIN — ANORECTAL OINTMENT: 15.7; .44; 24; 20.6 OINTMENT TOPICAL at 20:50

## 2025-04-28 RX ADMIN — AMLODIPINE BESYLATE 5 MG: 5 TABLET ORAL at 08:47

## 2025-04-28 RX ADMIN — MICONAZOLE NITRATE: 2 CREAM TOPICAL at 08:47

## 2025-04-28 RX ADMIN — INSULIN GLARGINE 32 UNITS: 100 INJECTION, SOLUTION SUBCUTANEOUS at 20:46

## 2025-04-28 RX ADMIN — APIXABAN 5 MG: 5 TABLET, FILM COATED ORAL at 20:49

## 2025-04-28 RX ADMIN — ATORVASTATIN CALCIUM 40 MG: 40 TABLET, FILM COATED ORAL at 20:49

## 2025-04-28 RX ADMIN — UMECLIDINIUM BROMIDE AND VILANTEROL TRIFENATATE 1 PUFF: 62.5; 25 POWDER RESPIRATORY (INHALATION) at 08:48

## 2025-04-28 RX ADMIN — ANORECTAL OINTMENT: 15.7; .44; 24; 20.6 OINTMENT TOPICAL at 08:47

## 2025-04-28 ASSESSMENT — ACTIVITIES OF DAILY LIVING (ADL)
ADLS_ACUITY_SCORE: 58
ADLS_ACUITY_SCORE: 57
ADLS_ACUITY_SCORE: 58
ADLS_ACUITY_SCORE: 57
ADLS_ACUITY_SCORE: 58

## 2025-04-28 NOTE — PLAN OF CARE
Problem: Wound  Goal: Optimal Wound Healing  Outcome: Progressing     Problem: Pain Acute  Goal: Optimal Pain Control and Function  Outcome: Progressing     Pt is alert and oriented x 4. Right labia wound care completed this morning. Pt's wound vac developed a leak this shift. Leak patched and currently maintaining seal. Pt up in the chair x 1. She denied having any pain. No PRN medications given this shift. Pt ate 100% of breakfast and lunch. Blood sugar 202 & 170.

## 2025-04-28 NOTE — PLAN OF CARE
Problem: Pain Acute  Goal: Optimal Pain Control and Function  Outcome: Progressing  Intervention: Optimize Psychosocial Wellbeing  Recent Flowsheet Documentation  Taken 4/27/2025 2330 by Phil Reyes RN  Supportive Measures:   active listening utilized   decision-making supported  Diversional Activities: television  Intervention: Prevent or Manage Pain  Recent Flowsheet Documentation  Taken 4/27/2025 2330 by Phil Reyes RN  Sensory Stimulation Regulation: television on  Sleep/Rest Enhancement: awakenings minimized  Bowel Elimination Promotion: adequate fluid intake promoted  Medication Review/Management: medications reviewed   Goal Outcome Evaluation:             Pt alert and oriented x 4. Denied pain or discomfort.No PRN medication given.  VSS. Denies nausea, dizziness, shortness of breath and lightheadedness. On O2 via  mask. Ambulates assist of  one with walker and  gait belt. On regular diet, adequate intake and output. Last BM: 4/27/25.  Incontinent of bowel  and had a folie.  Skin looks multiple open areas covered by wound vac dressing. Uses call light appropriately.  Mood pleasant and cooperative for cares an treatment. Handling hospitalization well.  Continue to monitor.      Phil Reyes RN

## 2025-04-28 NOTE — PROGRESS NOTES
Pt is on 3 lpm nc, jose well. Bipap is on hold. SAT 94-96%, HR 92-95, RR 22-24. BS clear/diminish .ABG done    ABG4/28@5:21am 7.44/43/72/28/96%

## 2025-04-28 NOTE — PROGRESS NOTES
Care Management Follow Up    Length of Stay (days): 17    Expected Discharge Date: 05/01/2025     Concerns to be Addressed:       Patient plan of care discussed at interdisciplinary rounds: Yes    Anticipated Discharge Disposition:  still to be determined; patient sounds like she would like to go to TCU in [place]; her sister was hoping patient could go to [place].           Anticipated Discharge Services:  TCU; wound vac dressings.  Bipap vs O2 at discharge (or both)?  Anticipated Discharge DME:      Patient/family educated on Medicare website which has current facility and service quality ratings:  will review with patient   Education Provided on the Discharge Plan:    Patient/Family in Agreement with the Plan:      Referrals Placed by CM/SW:    Private pay costs discussed: Not applicable    Discussed  Partnership in Safe Discharge Planning  document with patient/family: Yes: discussed with patient during initial care management assessment.     Handoff Completed: No, handoff not indicated or clinically appropriate    Additional Information: Patient was discussed with the interdisciplinary team this week.  Reason patient is not able to discharge to a lower level of care:  She is still having wound vac cares 2 x week. Her drain is scheduled to be removed on 4/29.  ACE score 57/100.  There is a care progression meeting scheduled for this Wednesday, 4/30 at 10 am.      Addendum:  spoke to patient's sister this morning - sister is hoping that we can find a placement for patient near sister's home in [place], [place] or [place], [place].   We will need to discuss discharge planning with patient; prior to hospitalization, patient living with sig other, Tavo, in [place].    Addendum #2 - spoke with patient's home care RN, Kristina Wallace, [phone].  She wanted to let us know that patient's sister, Mary, should not be in charge of where patient goes. Apparently the sister was in a group home in [place], and  left that place and has been homeless herself.  Kristina RN, did agree that patient and her significant other both need additional support.  Patient would benefit from group home placement and the additional support for medications and diabetic management.  [facility] would be able to do wound cares for patient, if needed, but Kristina feels that patient should go to TCU first. She suggested that [facility] has group homes in [facility] and they should be contacted when trying to find a group home for patient; [facility] # is [phone].      Next Steps:  will continue to follow patient; discharge planning as appropriate.    Therese Woodson RN, BSN  Care Coordination  [facility]  [phone]

## 2025-04-28 NOTE — PROGRESS NOTES
Pulmonary Progress Note    Admit Date: 4/11/2025  CODE: Full Code    Assessment:   Marly Cannon is a 59 year old woman with history of intellectual disability & memory issues, COPD, untreated ADEEL, tobacco use d/o, HTN, pAFib, & poorly controlled T2DM s/p previous hospitalization for DKA (3/18 - 3/20).      Admitted 3/22/2025 and treated for YADIRA, LLL PE, DKA, pneumonia, and A-fib with RVR. Intubated 3/23 for worsening hypoxia. CTA demonstrated PE progression and RLL collapse concerning for mucous plugging and pneumonia. Sputum culture grew Candida. Extubated 4/1. Completed course of antibiotics & antifungals. General surgery consulted for right keiry/perineal abscess and performed incision and drainage with Penrose drain 3/31 with subsequent repeat I&D 4/2 and 4/8. To [place] on 4/11. On room air since 4/20 and using BiPAP(16/8) with a backup rate of 16 with FiO2 21% at night. Recent imaging & lungs largely clear bilaterally w mild bibasilar atelectasis on CXR from 4/9. Noted with good BiPAP compliance since admission. Our service was consulted to see if qualifies for a home BiPAP.     Initial ABG on 4/23 w/ normal pCO2(37). BiPAP held overnight and repeat pCO2 the next morning on 4/24 still normal at 39.  BiPAP kept on hold thru the weekend & pCO2 this am was 43.  Bedside spirometry with FEV1/FVC 74% predicted.  Patient currently does not qualify for BiPAP under current guidelines but will qualify for a S mode BiPAP if her pCO2 reaches >/= 45.        Acute on chronic hypoxic and hypercapnic respiratory failure: resolved   PE on apixaban  COPD: 40+ pack year smoker with severe obstruction & possible fixed upper airway obstruction on spirometry: on Stiolto(LAMA/LABA)  ADEEL, Sleep hypoxemia and hypoventilation: sleep titration study 08/2023 required BiPAP 22/18. Poor compliance previously and patient stopped using. Good compliance during this hospitalization   Recent pneumonia completed antibiotics   intellectual  disability & memory issues suspect main reason leading to prior BiPAP compliance     Plan:     Continue to hold BIPAP. Likely repeat ABG in a couple days or sooner if clinical change, Once she is discharged anticipate compliance to be an ongoing issue with underlying intellectual disability. Nocturnal oxygen may be a more appropriate option, but will currently continue work-up to qualify as she's expressed interest on wanting to try it again  Anoro Ellipta inhaler. Nebs PRN  IS and FV for pulmonary hygiene      Clinical status discussed today with respiratory therapist and patient   Subjective    No complaints today     Data:   BP (!) 140/68 (BP Location: Left arm)   Pulse 91   Temp 97.7  F (36.5  C) (Oral)   Resp 22   Wt 83 kg (182 lb 14.4 oz)   SpO2 95%   BMI 30.44 kg/m       I/O last 3 completed shifts:  In: 1143 [P.O.:1140; I.V.:3]  Out: 1650 [Urine:1650]  Weight change: -0.337 kg (-11.9 oz)    Resp: 22    Exam:   Gen: No acute distress  HEENT: NT  CV: RRR, no m/g/r  Resp: CTAB; non-labored   Abd: soft, nontender, BS+  Skin: no visible rashes or lesions  Ext: no edema  Neuro: PERRL, nonfocal exam, forgetful     ROS: A complete 10-system review of systems was obtained and is negative with the exception of what is noted in the subjective history.    Medications:     Current Facility-Administered Medications   Medication Dose Route Frequency Provider Last Rate Last Admin     Current Facility-Administered Medications   Medication Dose Route Frequency Provider Last Rate Last Admin    amLODIPine (NORVASC) tablet 5 mg  5 mg Oral Daily Tomas Benitez MD   5 mg at 04/28/25 0847    apixaban ANTICOAGULANT (ELIQUIS) tablet 5 mg  5 mg Oral BID Tomas Benitez MD   5 mg at 04/28/25 0847    atorvastatin (LIPITOR) tablet 40 mg  40 mg Oral At Bedtime Tomas Benitez MD   40 mg at 04/27/25 2049    insulin aspart (NovoLOG) injection (RAPID ACTING)   Subcutaneous TID w/meals Andrez Mueller MD   16 Units at 04/28/25 0847     insulin aspart (NovoLOG) injection (RAPID ACTING)  1-10 Units Subcutaneous TID AC Tomas Benitez MD   3 Units at 04/28/25 0845    insulin aspart (NovoLOG) injection (RAPID ACTING)  1-7 Units Subcutaneous At Bedtime Tomas Benitez MD   1 Units at 04/25/25 2123    insulin glargine (LANTUS PEN) injection 32 Units  32 Units Subcutaneous At Bedtime Andrez Mueller MD   32 Units at 04/27/25 2138    menthol-zinc oxide (CALMOSEPTINE) 0.44-20.6 % ointment OINT   Topical TID Yolis Olvera MD   Given at 04/28/25 0847    miconazole (MICATIN) 2 % cream   Topical BID Andrez Mueller MD   Given at 04/28/25 0847    multivitamin w/minerals (THERA-VIT-M) tablet 1 tablet  1 tablet Oral Daily Andrez Mueller MD   1 tablet at 04/28/25 0847    sodium chloride (PF) 0.9% PF flush 3 mL  3 mL Intracatheter Q8H [facility] Tomas Benitez MD   3 mL at 04/28/25 0545    umeclidinium-vilanterol (ANORO ELLIPTA) 62.5-25 MCG/ACT oral inhaler 1 puff  1 puff Inhalation Daily Tomas Benitez MD   1 puff at 04/28/25 0848       Labs:   All laboratory and radiology has been personally reviewed by myself today.  Arterial Blood Gases   Recent Labs   Lab 04/28/25  0521 04/24/25  0612 04/23/25  1503   PH 7.44 7.45 7.48*   PCO2 43 39 37   PO2 72* 68* 66*   HCO3 28 27 28     Complete Blood Count   Recent Labs   Lab 04/28/25  0521 04/26/25  0639 04/25/25  0650 04/24/25  0612 04/23/25  1503 04/23/25  0623   WBC  --   --  10.6  --   --  10.8   HGB 11.6*  --  11.4* 11.3* 11.8 11.4*   PLT  --  410 400  --   --  524*     Basic Metabolic Panel  Recent Labs   Lab 04/28/25  0822 04/28/25  0521 04/27/25  2120 04/27/25  1724 04/26/25  0808 04/26/25  0639 04/25/25  0816 04/25/25  0650 04/24/25  0817 04/24/25  0612 04/23/25  1711 04/23/25  1503 04/23/25  0815 04/23/25  0623   NA  --  139  --   --   --   --   --  140  --  137  --  141  --  138   POTASSIUM  --  4.1  --   --   --   --   --  4.2  --  4.3  --  3.7  --  4.6   CHLORIDE  --   --   --   --   --   --   --  103  --   --   --    --   --  103   CO2  --   --   --   --   --   --   --  25  --   --   --   --   --  29   BUN  --   --   --   --   --   --   --  43.3*  --   --   --   --   --  39.3*   CR  --   --   --   --   --  1.25*  --  1.30*  --   --   --   --   --  1.26*   * 194* 125* 297*   < >  --    < > 137*   < > 190*   < > 156*   < > 196*    < > = values in this interval not displayed.         Radiology: Personally reviewed; radiology read below     XR CHEST PORT 1 VIEW  LOCATION: [facility]  DATE: 4/9/2025     INDICATION: VAP follow up  COMPARISON: 4/5/2025 radiograph. 4/7/2025 CT.                                                                      IMPRESSION: A right IJ catheter terminates over the right upper mediastinum. No pleural fluid appreciated on this study. No pneumothorax. No definite airspace disease. Normal size of the hear    Bonilla Miller CNP  Pulmonary Medicine  [facility]  Pager [phone]  Office: [phone]

## 2025-04-28 NOTE — PROGRESS NOTES
[facility]    Medicine Progress Note - Hospitalist Service    Date of Admission:  4/11/2025  Brief Course Prior to [facility]:  59 up F with h/o intellectual disability & memory issues, COPD, untreated ADEEL, tobacco use d/o, HTN, pAFib, & poorly controlled T2DM s/p previous hospitalization for DKA (3/18 - 3/20) who presented second time to hospital in [place] on 3/22/2025 with complaints of dyspnea, hyperglycemia, and chest pain. She was found to have an YADIRA, LLL PE, DKA, and A-fib with RVR. She was started on an insulin drip, therapeutic dose enoxaparin, Zosyn for possible pneumonia, and diltiazem drip for management of her RVR. Her DKA resolved, she was switched from diltiazem to amiodarone drip for management of A-fib with RVR; however, her degree of respiratory failure was still concerning and she was intubated 3/23 for management of worsening hypoxia.   She had a repeat CTA that demonstrated progression of her PE and RLL collapse concerning for mucous plugging and pneumonia.   She was switched from enoxaparin to heparin drip and her antibiotics were broadened to vancomycin and Zosyn. She was on norepinephrine for several days d/t hypotension attributed to sedation & sepsis-related vasoplegia. It appears that she has been doing fairly long SBTs since 3/26, which seemed to be going reasonably well per RT documentation. Primary team has been having issues w/ progressively increasing FiO2 needs. She had a sputum culture grow Candida & has been on fluconazole since 3/25. Transferred to [facility] d/t concerns that she might require a tracheostomy secondary to her inability to liberate from the vent.   At [facility] patient was admitted to the ICU on a ventilator, successfully extubated 4/1 and downgraded to floor status 4/3.  Infectious disease consulted and continued patient on Zosyn course to be completed through 4/17/2025.  General surgery consulted for right keiry/perineal abscess and performed incision and  drainage with Penrose drain placement 3/31 with subsequent repeat I&D 4/2 and 4/8.  Surgery requests wound VAC be changed by wound care Tuesdays and Thursdays.     LTACH Course:  She transferred from [facility] to [place] for wound cares, therapies, and complex medical care.   4/12-4/14: Needing BiPAP overnight.  4/15-4/20: successfully weaned off NC O2 during the day, completed zosyn 4/17 4/21-4/27- wound is closing well - [facility] plan to remove penrose drain on 4/29/2025. Consider transfer to [facility] for ongoing therapy and wound care.  4/28-5/3:      BARRIERS TO DISCHARGE (why care is unable to be provided at a lower level of care):    Large right groin wound requiring frequent cares, wound vac, IV abx, nocturnal BIPAP     Things to follow up on:  -unclear what will be best outpatient diabetes regimen as outpatient due to intellectual disability  -check ABG 4/28 after being off nocturnal BIPAP several days    Assessment & Plan   Acute on Chronic Hypoxic Respiratory Failure  VAP, PTA - resolved  COPD  ADEEL  Transferred from [place] for concerns of tracheostomy need, was able to liberate from ventilator withOUT tracheostomy formation. Weaned to minimal supplemental oxygen support (2-3L nasal).  - Completed zosyn 4/17  - inhalers as needed  - RT following     Perineal Abscess  Condyloma Acuminata  s/p acyclovir and I&D x3  - zosyn completed 4/17  - rectal tube discontinued  - noguera for wound protection  - VAC in place  - Continue wound care per [facility] nurse     PE, Acute, segmental, subsegmental  -apixaban 5mg PO BID    Seborrheic dermatitis:  Ketoconazole cream to face     T2DM  Poorly controlled as outpatient with A1c 12.7. Used to be on insulin pump. No longer on it due to intellectual delay.  - BG labile earlier during admission  - Increase Lantus 30 unit(s)->32 units qhs  - I:C of 1:5 -> I:C 1:4 with meals  - ISS high     CKD3b  - Creatinine stable. Monitor     Elevated Hgb in past  Historically  high  Consistent with smoking (high CO), untreated ADEEL     History of intellectual delay: supportive care.    PROBLEMS MANAGED During this or previous Encounter Now Stable but Monitoring:    Reactive thrombocytosis:  Platelets historically in normal range (reviewed last 3 years)  Peaked at 700,000, now resolved  Likely  reactive - reviewed flow chart for acute thrombocytosis, posted in note  Normal peripheral smear, liver enzymes           Diet: Snacks/Supplements Adult: Expedite Cup; With Meals  Snacks/Supplements Adult: Magic Cup; With Meals  Snacks/Supplements Adult: Gelatein 20 (sugar-free); With Meals  Combination Diet Moderate Consistent Carb (60 g CHO per Meal) Diet; Easy to Chew (level 7); Thin Liquids (level 0)    DVT Prophylaxis: DOAC  Landrum Catheter: PRESENT, indication: Wound deterioration and failed external collection device  Lines: None     Cardiac Monitoring: None  Code Status: Full Code      Clinically Significant Risk Factors            # Hypercalcemia: Highest iCa = 5.3 mg/dL in last 2 days, will monitor as appropriate    # Hypoalbuminemia: Lowest albumin = 2.9 g/dL at 4/13/2025  2:14 AM, will monitor as appropriate     # Hypertension: Noted on problem list           # DMII: A1C = 12.7 % (Ref range: <5.7 %) within past 6 months       # COPD: noted on problem list        Social Drivers of Health    Tobacco Use: Medium Risk (4/2/2025)    Patient History     Smoking Tobacco Use: Former     Smokeless Tobacco Use: Never     Passive Exposure: Current   Physical Activity: Unknown (5/16/2024)    Exercise Vital Sign     Days of Exercise per Week: 0 days   Social Connections: Unknown (5/16/2024)    Social Connection and Isolation Panel [NHANES]     Frequency of Social Gatherings with Friends and Family: More than three times a week          Disposition Plan     Medically Ready for Discharge: Anticipated in 5+ Days         Alfred Patel MD  Hospitalist Service  LTACH  Securely message with [employer] [place]  info)  Text page via [employer] Paging/Directory   ______________________________________________________________________    Interval History   NO overnight events. Overall feeling well.     Physical Exam   Vital Signs: Temp: 97.7  F (36.5  C) Temp src: Oral BP: (!) 140/68 Pulse: 91   Resp: 22 SpO2: 95 % O2 Device: Nasal cannula Oxygen Delivery: 3 LPM  Weight: 182 lbs 14.4 oz    General Appearance: Older F in NAD  Respiratory:  rhonchi bilaterally  Cardiovascular: RRR S1S2  GI: soft, NT  Skin: Face with areas of somewhat inflamed acne. Also wide-spread seborrheic dermatitis.  Large right groin wound - see WOC note - dressing in place  Neuro: Alert, generally nonfocal on motor and sensory testing      Medical Decision Making       50 MINUTES SPENT BY ME on the date of service doing chart review, history, exam, documentation & further activities per the note.      Data     I have personally reviewed the following data over the past 24 hrs:    N/A  \   11.6 (L)   / N/A     139 N/A N/A /  202 (H)   4.1 N/A N/A \     Procal: N/A CRP: N/A Lactic Acid: 1.0         Imaging results reviewed over the past 24 hrs:   No results found for this or any previous visit (from the past 24 hours).

## 2025-04-29 ENCOUNTER — APPOINTMENT (OUTPATIENT)
Dept: PHYSICAL THERAPY | Facility: CLINIC | Age: 60
DRG: 602 | End: 2025-04-29
Attending: INTERNAL MEDICINE
Payer: COMMERCIAL

## 2025-04-29 LAB
ANION GAP SERPL CALCULATED.3IONS-SCNC: 8 MMOL/L (ref 7–15)
BACTERIA BRONCH: ABNORMAL
BACTERIA BRONCH: NO GROWTH
BUN SERPL-MCNC: 43.7 MG/DL (ref 8–23)
CALCIUM SERPL-MCNC: 9.6 MG/DL (ref 8.8–10.4)
CHLORIDE SERPL-SCNC: 99 MMOL/L (ref 98–107)
CREAT SERPL-MCNC: 1.2 MG/DL (ref 0.51–0.95)
EGFRCR SERPLBLD CKD-EPI 2021: 51 ML/MIN/1.73M2
ERYTHROCYTE [DISTWIDTH] IN BLOOD BY AUTOMATED COUNT: 13.9 % (ref 10–15)
GLUCOSE BLDC GLUCOMTR-MCNC: 153 MG/DL (ref 70–99)
GLUCOSE BLDC GLUCOMTR-MCNC: 188 MG/DL (ref 70–99)
GLUCOSE BLDC GLUCOMTR-MCNC: 272 MG/DL (ref 70–99)
GLUCOSE BLDC GLUCOMTR-MCNC: 62 MG/DL (ref 70–99)
GLUCOSE BLDC GLUCOMTR-MCNC: 82 MG/DL (ref 70–99)
GLUCOSE SERPL-MCNC: 130 MG/DL (ref 70–99)
HCO3 SERPL-SCNC: 30 MMOL/L (ref 22–29)
HCT VFR BLD AUTO: 36.1 % (ref 35–47)
HGB BLD-MCNC: 12 G/DL (ref 11.7–15.7)
MCH RBC QN AUTO: 30.9 PG (ref 26.5–33)
MCHC RBC AUTO-ENTMCNC: 33.2 G/DL (ref 31.5–36.5)
MCV RBC AUTO: 93 FL (ref 78–100)
PLATELET # BLD AUTO: 326 10E3/UL (ref 150–450)
POTASSIUM SERPL-SCNC: 4.2 MMOL/L (ref 3.4–5.3)
RBC # BLD AUTO: 3.88 10E6/UL (ref 3.8–5.2)
SODIUM SERPL-SCNC: 137 MMOL/L (ref 135–145)
WBC # BLD AUTO: 10.4 10E3/UL (ref 4–11)

## 2025-04-29 PROCEDURE — 120N000017 HC R&B RESPIRATORY CARE

## 2025-04-29 PROCEDURE — 97606 NEG PRS WND THER DME>50 SQCM: CPT

## 2025-04-29 PROCEDURE — 250N000012 HC RX MED GY IP 250 OP 636 PS 637: Performed by: INTERNAL MEDICINE

## 2025-04-29 PROCEDURE — 250N000013 HC RX MED GY IP 250 OP 250 PS 637: Performed by: INTERNAL MEDICINE

## 2025-04-29 PROCEDURE — 99233 SBSQ HOSP IP/OBS HIGH 50: CPT | Performed by: STUDENT IN AN ORGANIZED HEALTH CARE EDUCATION/TRAINING PROGRAM

## 2025-04-29 PROCEDURE — 999N000123 HC STATISTIC OXYGEN O2DAILY TECH TIME

## 2025-04-29 PROCEDURE — 97530 THERAPEUTIC ACTIVITIES: CPT | Mod: GP | Performed by: PHYSICAL THERAPIST

## 2025-04-29 PROCEDURE — 36415 COLL VENOUS BLD VENIPUNCTURE: CPT | Performed by: STUDENT IN AN ORGANIZED HEALTH CARE EDUCATION/TRAINING PROGRAM

## 2025-04-29 PROCEDURE — 94799 UNLISTED PULMONARY SVC/PX: CPT

## 2025-04-29 PROCEDURE — 97110 THERAPEUTIC EXERCISES: CPT | Mod: GP | Performed by: PHYSICAL THERAPIST

## 2025-04-29 PROCEDURE — 999N000157 HC STATISTIC RCP TIME EA 10 MIN

## 2025-04-29 PROCEDURE — 250N000013 HC RX MED GY IP 250 OP 250 PS 637: Performed by: STUDENT IN AN ORGANIZED HEALTH CARE EDUCATION/TRAINING PROGRAM

## 2025-04-29 PROCEDURE — 82565 ASSAY OF CREATININE: CPT | Performed by: STUDENT IN AN ORGANIZED HEALTH CARE EDUCATION/TRAINING PROGRAM

## 2025-04-29 RX ADMIN — OXYCODONE HYDROCHLORIDE 10 MG: 10 TABLET ORAL at 11:50

## 2025-04-29 RX ADMIN — INSULIN GLARGINE 32 UNITS: 100 INJECTION, SOLUTION SUBCUTANEOUS at 21:28

## 2025-04-29 RX ADMIN — INSULIN ASPART 24 UNITS: 100 INJECTION, SOLUTION INTRAVENOUS; SUBCUTANEOUS at 12:15

## 2025-04-29 RX ADMIN — Medication 1 TABLET: at 09:28

## 2025-04-29 RX ADMIN — INSULIN ASPART 15 UNITS: 100 INJECTION, SOLUTION INTRAVENOUS; SUBCUTANEOUS at 08:30

## 2025-04-29 RX ADMIN — DEXTROSE 15 G: 15 GEL ORAL at 17:19

## 2025-04-29 RX ADMIN — INSULIN ASPART 1 UNITS: 100 INJECTION, SOLUTION INTRAVENOUS; SUBCUTANEOUS at 08:30

## 2025-04-29 RX ADMIN — APIXABAN 5 MG: 5 TABLET, FILM COATED ORAL at 09:28

## 2025-04-29 RX ADMIN — UMECLIDINIUM BROMIDE AND VILANTEROL TRIFENATATE 1 PUFF: 62.5; 25 POWDER RESPIRATORY (INHALATION) at 11:11

## 2025-04-29 RX ADMIN — ANORECTAL OINTMENT: 15.7; .44; 24; 20.6 OINTMENT TOPICAL at 21:29

## 2025-04-29 RX ADMIN — AMLODIPINE BESYLATE 5 MG: 5 TABLET ORAL at 09:28

## 2025-04-29 RX ADMIN — ATORVASTATIN CALCIUM 40 MG: 40 TABLET, FILM COATED ORAL at 21:32

## 2025-04-29 RX ADMIN — ANORECTAL OINTMENT: 15.7; .44; 24; 20.6 OINTMENT TOPICAL at 13:48

## 2025-04-29 RX ADMIN — ANORECTAL OINTMENT: 15.7; .44; 24; 20.6 OINTMENT TOPICAL at 09:28

## 2025-04-29 RX ADMIN — APIXABAN 5 MG: 5 TABLET, FILM COATED ORAL at 21:32

## 2025-04-29 RX ADMIN — INSULIN ASPART 2 UNITS: 100 INJECTION, SOLUTION INTRAVENOUS; SUBCUTANEOUS at 11:52

## 2025-04-29 ASSESSMENT — ACTIVITIES OF DAILY LIVING (ADL)
ADLS_ACUITY_SCORE: 57
ADLS_ACUITY_SCORE: 58
ADLS_ACUITY_SCORE: 58
ADLS_ACUITY_SCORE: 57
ADLS_ACUITY_SCORE: 58
ADLS_ACUITY_SCORE: 57
ADLS_ACUITY_SCORE: 58
ADLS_ACUITY_SCORE: 58
ADLS_ACUITY_SCORE: 57
ADLS_ACUITY_SCORE: 58
ADLS_ACUITY_SCORE: 58
ADLS_ACUITY_SCORE: 57
ADLS_ACUITY_SCORE: 58
ADLS_ACUITY_SCORE: 58

## 2025-04-29 NOTE — PLAN OF CARE
Problem: Adult Inpatient Plan of Care  Goal: Plan of Care Review  Description: The Plan of Care Review/Shift note should be completed every shift.  The Outcome Evaluation is a brief statement about your assessment that the patient is improving, declining, or no change.  This information will be displayed automatically on your shiftnote.  Outcome: Progressing  Goal: Absence of Hospital-Acquired Illness or Injury  Intervention: Identify and Manage Fall Risk  Recent Flowsheet Documentation  Taken 4/29/2025 0031 by Shraddha Galeano RN  Safety Promotion/Fall Prevention: activity supervised  Intervention: Prevent Skin Injury  Recent Flowsheet Documentation  Taken 4/29/2025 0031 by Shraddha Galeano RN  Body Position: (per pt request)   turned   right  Intervention: Prevent Infection  Recent Flowsheet Documentation  Taken 4/29/2025 0031 by Shraddha Galeano RN  Infection Prevention: hand hygiene promoted  Goal: Optimal Comfort and Wellbeing  Intervention: Monitor Pain and Promote Comfort  Recent Flowsheet Documentation  Taken 4/29/2025 0031 by Shraddha Galeano RN  Pain Management Interventions: (prn medication not needed at this time, per patient .) repositioned   Goal Outcome Evaluation:       Patient was A/O x 4, complained of pain  at the epigastrium region and requested to be repositioned at that time, patient was repositioned, writer offered to give PRN tylenol, patient refused, stated that pain will go away, patient slept most of the shift, was repositioned every 2 hours,patient  wound vac was  intact and draining good.  Around 0500, writer flushed patient PIV, patient complained that it was painful, swat nurse was informed and patient PIV was  pulled out.all other patient needs were attended to.                   

## 2025-04-29 NOTE — PROGRESS NOTES
[facility] Nurse Inpatient Assessment     Consulted for: perineal    Summary: Nurse asked [facility] to assess bridge of nose, patient has been using BiPap.  Area is slow to brittny, Bipap will not be used over the weekend, will ensure with next [facility] vist that erythema has resolved  Previous assessment below:  SHIRA FerrerN, RN, PHN, HNB-BC, CWOCN  Pager no longer is use, please contact through [employer] [place] group: [facility] [employer]     [facility] nurse follow-up plan: weekly    Patient History (according to provider note(s):      Marly Cannon is a 59 year old woman with history of intellectual disability & memory issues, COPD*on supplemental oxygen, untreated ADEEL, tobacco use d/o, HTN, pAFib, & poorly controlled T2DM w/ recent hospitalization for DKA (3/18 - 3/20), who presented to the hospital in [facility] on 3/22/2025 with complaints of dyspnea, hyperglycemia, and chest pain. She was found to have an YADIRA, LLL PE, DKA, and A-fib with RVR. She was started on an insulin drip, therapeutic dose enoxaparin, Zosyn for possible pneumonia, and diltiazem drip for management of her RVR. Her DKA has resolved, she was switched from diltiazem to amiodarone drip for management of A-fib with RVR; however, her degree of respiratory failure was still concerning and she was intubated 3/23 for management of worsening hypoxia. She had a repeat CTA that demonstrated progression of her PE and RLL collapse concerning for mucous plugging and pneumonia. She was switched from enoxaparin to heparin drip and her antibiotics were broadened to vancomycin and Zosyn. She was on norepinephrine for several days d/t hypotension attributed to sedation & sepsis-related vasoplegia. It appears that she has been doing fairly long SBTs since 3/26, which seemed to be going reasonably well per RT documentation. Primary team has been having issues w/ progressively increasing FiO2 needs. She had a sputum  culture grow Candida & has been on fluconazole since 3/25. She has remained on the insulin drip, presumably to manage the persistent hyperglycemia during her steroid burst (for the presumed COPD exacerbation). She has received a fairly large volume of documented fluids, & was started on diuresis 3/30. She was transferred to [facility] d/t concerns that she will require a tracheostomy secondary to her inability to liberate from the vent.     Preoperative Diagnosis: Perineal and right groin infection     Postoperative Diagnosis: Perineal and right groin necrotizing soft tissue infection     Procedure: Incision and drainage of perineum and right groin     Findings: Infection traveling up the right labia into the right groin.  Final debridement included a right groin defect measuring 18 x 8 x 5 cm and a perineal wound measuring 6 x 2 x 1 cm.       Assessment:      Wound location: Right groin       4/7/25                                                         4/15/25                                                      4/22/25        4/29       4/29 right groin    Wound due to: Surgical Wound   Wound history/plan of care:    Surgical date: 4/2 and 4/8   Service following: General Surgery  Date Negative Pressure Wound Therapy initiated: 04/09/25   Interventions in place: moisture/incontinence management  Is patient s nutritional status compromised? no   If yes, what interventions are in place? N/A  Reason for initiating vac therapy? Presence of co-morbidities, High risk of infections, and Need for accelerated granulation tissue  Which?of?the?following?co-morbidities?apply? Diabetes, Immobility, and Obesity  If diabetic is patient on a diabetic management program? Yes   Is osteomyelitis present in wound? no   If yes what treatments are in place? N/A  Wound due to: Surgical Wound  Wound history/plan of care: I&D 4/2/25 with second debridement on 4/8/25  Wound base: see photo - mix of adipose and granular tissue      Palpation of the wound bed: normal      Drainage:  scant     Description of drainage: serosanguinous     Measurements (length x width x depth, in cm): R groin wound measuring 5 cm x 14.5 cm x 7cm is the tunnel from previous penrose drain (removed today 4/29)  Labia area now shallow, see photo above     Undermining: NA  Periwound skin: Intact      Color: normal and consistent with surrounding tissue      Temperature: normal   Odor: none  Pain: mild   Pain interventions prior to dressing change: slow and gentle cares  and distraction, pain meds, soaking dressing  Started white foam due to pain with foam removal  Treatment goal: Drainage control and Infection control/prevention  STATUS: improving  Supplies ordered: supplies stored on unit, discussed with RN, and discussed with patient      Number of foam pieces removed from a wound (excluding foam for bridge) :2 piece white foam, 1 piece black foam  Verified this matched the number of foam pieces applied last dressing change: Yes  Number of foam pieces packed into wound (excluding foam for bridge) : 1 piece black foam      Treatment Plan:   Negative pressure wound therapy plan:  Wound location: Right groin   Change Days: Tues/ Fri   Supplies (including all accessories) used: medium Black foam   Cleanse with Vashe prior to replacing NPWT  Suction setting: -125   Methods used: Window paned all periwound skin with vac drape prior to applying sponge and pack into tunnel with black foam (like a wick), then cover inferior prior drain opening with VAC drape  PRIOR TO REMOVAL: soak dressing, turn off VAC when giving pain meds, use adhesive remover on tape    Staff RN to assess integrity of dressing and ensure suction is set at appropriate level every shift.   Date canister. Chart canister output every shift. Change cannister weekly and PRN if full/occluded     Remove foam dressing and replace with BID normal saline moist gauze dressing if:   -a dressing failure which cannot  "be repaired within 2 hours   -patient is discharging to home without a home pump   -patient is discharging to a facility outside the local area   -if a dressing is a \"Silver Foam\", remove before Radiation Therapy or MRI     The hospital VAC pump is not to be discharged with the patient. Please disconnect the patient from the machine prior to discharge.  If a home VAC pump has been delivered, connect the home cannister to dressing tubing and the cannister to the home pump, turn on home pump  If the patient is transferring to a nearby facility with a VAC, the tubing can be disconnected, clamp tubing and cover the end with a glove, then can be reconnected if within 2 hours  If transfer will be longer than 2 hours, dressing must be removed and placed with a wet to moist gauze dressing for transfer       R labia - daily and prn with any contamination  Cleanse with Vashe and place 1/2 3x3 Mepilex    Internal FMS - OK to remove if less than 200 mL output in a 24 hour period    Bilateral buttocks wounds: Every 3 days   Cleanse the area with wound cleanser and pat dry.  Apply No sting film barrier to periwound skin.  Cover wound with Sacral Mepilex (#[id number])  Change dressing Q 3 days.  Turn and reposition Q 2hrs side to side only.  Ensure pt has Pablo-cushion while sitting up in the chair.  If not in ICU: Ensure air pump on bed and set to Isoflex.  FYI- If pt has constant incontinent loose stools needing dressing changes Q shift please discontinue the Mepilex dressing and apply criticaid barrier paste BID and PRN.    Orders: Reviewed and Updated    RECOMMEND PRIMARY TEAM ORDER: None, at this time  Education provided: plan of care, wound progress, and Moisture management  Discussed plan of care with: Patient and Nurse; call out to surgeon's office for penrose drain removal - at [facility] versus Surgeon's office  Notify WOC if wound(s) deteriorate.  Nursing to notify the Provider(s) and re-consult the [facility] Nurse if new skin " concern.    DATA:     Current support surface: Standard  Standard gel mattress (Isoflex)  Containment of urine/stool: Incontinence Protocol and Indwelling catheter  BMI: Body mass index is 30.44 kg/m .   Active diet order: Orders Placed This Encounter      Combination Diet Moderate Consistent Carb (60 g CHO per Meal) Diet; Easy to Chew (level 7); Thin Liquids (level 0)     Output: I/O last 3 completed shifts:  In: 680 [P.O.:680]  Out: 1920 [Urine:1700; Drains:220]     Labs:   Recent Labs   Lab 04/29/25  0625   HGB 12.0   WBC 10.4     Pressure injury risk assessment:   Sensory Perception: 3-->slightly limited  Moisture: 3-->occasionally moist  Activity: 2-->chairfast  Mobility: 3-->slightly limited  Nutrition: 2-->probably inadequate  Friction and Shear: 2-->potential problem  Vicente Score: 15    SHIRA FerrerN, RN, PHN, HNB-BC, CWOCN  Pager no longer is use, please contact through [phone] group: [facility] [employer] [facility]

## 2025-04-29 NOTE — Clinical Note
Pt VSS tachy. Alert, cooperative. No complains of pain. On RA, with 3 liter NC, tolerating well. Adequate intake, ate 100% of dinner. Last BM today. Landrum output adequate. Wounds intact, vac at 125mmhg, minimal output. Blood sugar was in the low 60s at 1711, PRN glucose gel and ate 100% dinner, rechecked at 5:35 and at 82, held evening aspart. MD notified

## 2025-04-29 NOTE — PLAN OF CARE
Goal Outcome Evaluation:      Plan of Care Reviewed With: patient    Overall Patient Progress: improving Overall Patient Progress: improving    Outcome Evaluation: Pt VSS. Alert, cooperative. No complains of pain. On RA, with 1 liter NC, tolerating well. Adequate intake, ate 100% of dinner. Last BM today. Landrum output adequate. Wounds intact, vac at 125mmhg, minimal output. BS, 100 and 103    /66 (BP Location: Left arm, Patient Position: Semi-Bahena's, Cuff Size: Adult Regular)   Pulse 85   Temp 98.3  F (36.8  C) (Oral)   Resp 20   Wt 83 kg (182 lb 14.4 oz)   SpO2 96%   BMI 30.44 kg/m       Marietta Ring RN

## 2025-04-29 NOTE — PROGRESS NOTES
Pt continues on 3lnc.  BRS clear.  Blood pressure 106/71, pulse 86, temperature 98.4  F (36.9  C), temperature source Oral, resp. rate 20, weight 83 kg (182 lb 14.4 oz), SpO2 92%.  Plan to continue to hold bipap and ABG tomorrow am or sooner if clinical change.

## 2025-04-29 NOTE — PLAN OF CARE
Problem: Wound  Goal: Skin Health and Integrity  Outcome: Progressing     Problem: Pain Acute  Goal: Optimal Pain Control and Function  Outcome: Progressing     Pt is alert and oriented x 4. SpO2 94% with 2.5 liters via nasal cannula.  Wound vac and right labia dressing changed today by WOC RN.  Penrose drain was removed by WOC RN during dressing change.  Pt denied having any pain, but PRN oxycodone 10 mg was given prior to dressing change to prevent pain. Pt said that this was effective at reducing pain during dressing change. Pt ate 100% of breakfast and lunch. Blood sugar was 153 and 188.   Medication: Amlodipine 10 mg  90 with 0 on 8/23/2023  Last office visit date: 9/21/2023  Future appointment 3/21/2024  Medication Refill Protocol Failed.  Failed criteria:  Medication (including dose and sig) on current meds list. Sent to clinician to review.      Patient's dosage was decreased to Amlodipine 5 mg when she was discharged from the hospital on 9/13/2023, but the patient stated that she went back to the 10 mg on her own shortly after her discharge.    Should the patient continue to take the 10 mg tablets?    Please advise

## 2025-04-29 NOTE — PROGRESS NOTES
[facility]    Medicine Progress Note - Hospitalist Service    Date of Admission:  4/11/2025  Brief Course Prior to [facility]:  59 up F with h/o intellectual disability & memory issues, COPD, untreated ADEEL, tobacco use d/o, HTN, pAFib, & poorly controlled T2DM s/p previous hospitalization for DKA (3/18 - 3/20) who presented second time to hospital in [place] on 3/22/2025 with complaints of dyspnea, hyperglycemia, and chest pain. She was found to have an YADIRA, LLL PE, DKA, and A-fib with RVR. She was started on an insulin drip, therapeutic dose enoxaparin, Zosyn for possible pneumonia, and diltiazem drip for management of her RVR. Her DKA resolved, she was switched from diltiazem to amiodarone drip for management of A-fib with RVR; however, her degree of respiratory failure was still concerning and she was intubated 3/23 for management of worsening hypoxia.   She had a repeat CTA that demonstrated progression of her PE and RLL collapse concerning for mucous plugging and pneumonia.   She was switched from enoxaparin to heparin drip and her antibiotics were broadened to vancomycin and Zosyn. She was on norepinephrine for several days d/t hypotension attributed to sedation & sepsis-related vasoplegia. It appears that she has been doing fairly long SBTs since 3/26, which seemed to be going reasonably well per RT documentation. Primary team has been having issues w/ progressively increasing FiO2 needs. She had a sputum culture grow Candida & has been on fluconazole since 3/25. Transferred to [facility] d/t concerns that she might require a tracheostomy secondary to her inability to liberate from the vent.   At [facility] patient was admitted to the ICU on a ventilator, successfully extubated 4/1 and downgraded to floor status 4/3.  Infectious disease consulted and continued patient on Zosyn course to be completed through 4/17/2025.  General surgery consulted for right keiry/perineal abscess and performed incision and  drainage with Penrose drain placement 3/31 with subsequent repeat I&D 4/2 and 4/8.  Surgery requests wound VAC be changed by wound care Tuesdays and Thursdays.     LTACH Course:  She transferred from [facility] to [place] for wound cares, therapies, and complex medical care.   4/12-4/14: Needing BiPAP overnight.  4/15-4/20: successfully weaned off NC O2 during the day, completed zosyn 4/17 4/21-4/27- wound is closing well - [facility] plan to remove penrose drain on 4/29/2025. Consider transfer to [facility] for ongoing therapy and wound care.  4/28-5/3:  New information regarding placement needs surfaced early in the week, likely needing group home.    BARRIERS TO DISCHARGE (why care is unable to be provided at a lower level of care):    Large right groin wound requiring frequent cares, wound vac, IV abx, nocturnal BIPAP     Things to follow up on:  -unclear what will be best outpatient diabetes regimen as outpatient due to intellectual disability    Assessment & Plan   Acute on Chronic Hypoxic Respiratory Failure  VAP, PTA - resolved  COPD  ADEEL  Transferred from [place] for concerns of tracheostomy need, was able to liberate from ventilator withOUT tracheostomy formation. Weaned to minimal supplemental oxygen support (2-3L nasal).  - Completed zosyn 4/17  - inhalers as needed  - RT following     Perineal Abscess  Condyloma Acuminata  s/p acyclovir and I&D x3  - zosyn completed 4/17  - rectal tube discontinued  - noguera for wound protection  - VAC in place  - Continue wound care per [facility] nurse     PE, Acute, segmental, subsegmental  -apixaban 5mg PO BID    Seborrheic dermatitis:  Ketoconazole cream to face     T2DM  Poorly controlled as outpatient with A1c 12.7. Used to be on insulin pump. No longer on it due to intellectual delay.  - BG labile earlier during admission  - Increase Lantus 30 unit(s)->32 units qhs  - I:C of 1:5 -> I:C 1:4 with meals  - ISS high     CKD3b  - Creatinine stable. Monitor     Elevated Hgb  in past  Historically high  Consistent with smoking (high CO), untreated ADEEL     History of intellectual delay: supportive care.    PROBLEMS MANAGED During this or previous Encounter Now Stable but Monitoring:    Reactive thrombocytosis:  Platelets historically in normal range (reviewed last 3 years)  Peaked at 700,000, now resolved  Likely  reactive - reviewed flow chart for acute thrombocytosis, posted in note  Normal peripheral smear, liver enzymes           Diet: Snacks/Supplements Adult: Expedite Cup; With Meals  Snacks/Supplements Adult: Magic Cup; With Meals  Snacks/Supplements Adult: Gelatein 20 (sugar-free); With Meals  Combination Diet Moderate Consistent Carb (60 g CHO per Meal) Diet; Easy to Chew (level 7); Thin Liquids (level 0)    DVT Prophylaxis: DOAC  Landrum Catheter: PRESENT, indication: Wound deterioration and failed external collection device  Lines: None     Cardiac Monitoring: None  Code Status: Full Code      Clinically Significant Risk Factors            # Hypercalcemia: Highest iCa = 5.3 mg/dL in last 2 days, will monitor as appropriate    # Hypoalbuminemia: Lowest albumin = 2.9 g/dL at 4/13/2025  2:14 AM, will monitor as appropriate     # Hypertension: Noted on problem list           # DMII: A1C = 12.7 % (Ref range: <5.7 %) within past 6 months       # COPD: noted on problem list        Social Drivers of Health    Tobacco Use: Medium Risk (4/2/2025)    Patient History     Smoking Tobacco Use: Former     Smokeless Tobacco Use: Never     Passive Exposure: Current   Physical Activity: Unknown (5/16/2024)    Exercise Vital Sign     Days of Exercise per Week: 0 days   Social Connections: Unknown (5/16/2024)    Social Connection and Isolation Panel [NHANES]     Frequency of Social Gatherings with Friends and Family: More than three times a week          Disposition Plan     Medically Ready for Discharge: Anticipated in 5+ Days         Alfred Patel MD  Hospitalist Service  LTACH  Securely  message with [employer] (more info)  Text page via [email or web address] Paging/Directory   ______________________________________________________________________    Interval History   NO overnight events. Overall feeling well; BiPAP trials over the weekend with VBG testing support S mode BiPAP at this time. Additional information regarding placement surfaced yesterday.    Physical Exam   Vital Signs: Temp: 98.5  F (36.9  C) Temp src: Oral BP: 120/70 Pulse: 102   Resp: 22 SpO2: 94 % O2 Device: Nasal cannula Oxygen Delivery: 2.5 LPM  Weight: 182 lbs 14.4 oz    General Appearance: Older F in NAD  Respiratory:  rhonchi bilaterally  Cardiovascular: RRR S1S2  GI: soft, NT  Skin: Face with areas of somewhat inflamed acne. Also wide-spread seborrheic dermatitis.  Large right groin wound - see WOC note - dressing in place  Neuro: Alert, generally nonfocal on motor and sensory testing      Medical Decision Making       55 MINUTES SPENT BY ME on the date of service doing chart review, history, exam, documentation & further activities per the note.      Data     I have personally reviewed the following data over the past 24 hrs:    10.4  \   12.0   / 326     137 99 43.7 (H) /  153 (H)   4.2 30 (H) 1.20 (H) \       Imaging results reviewed over the past 24 hrs:   No results found for this or any previous visit (from the past 24 hours).

## 2025-04-29 NOTE — PROGRESS NOTES
Pt is on 3 lpm nc, jose well. Bipap is on hold. SAT 93-97%, HR 84-90, RR 18-20. BS clear/diminish . ABG on 5/1    ABG4/28@5:21am 7.44/43/72/28/96%

## 2025-04-30 ENCOUNTER — APPOINTMENT (OUTPATIENT)
Dept: OCCUPATIONAL THERAPY | Facility: CLINIC | Age: 60
DRG: 602 | End: 2025-04-30
Attending: INTERNAL MEDICINE
Payer: COMMERCIAL

## 2025-04-30 ENCOUNTER — APPOINTMENT (OUTPATIENT)
Dept: PHYSICAL THERAPY | Facility: CLINIC | Age: 60
DRG: 602 | End: 2025-04-30
Attending: INTERNAL MEDICINE
Payer: COMMERCIAL

## 2025-04-30 LAB
BASE EXCESS BLDA CALC-SCNC: 4.6 MMOL/L (ref -3–3)
CA-I BLD-MCNC: 5.2 MG/DL (ref 4.4–5.2)
GLUCOSE BLD-MCNC: 182 MG/DL (ref 70–99)
GLUCOSE BLDC GLUCOMTR-MCNC: 142 MG/DL (ref 70–99)
GLUCOSE BLDC GLUCOMTR-MCNC: 179 MG/DL (ref 70–99)
GLUCOSE BLDC GLUCOMTR-MCNC: 260 MG/DL (ref 70–99)
GLUCOSE BLDC GLUCOMTR-MCNC: 282 MG/DL (ref 70–99)
HCO3 BLDA-SCNC: 29 MMOL/L (ref 21–28)
HGB BLD-MCNC: 11.5 G/DL (ref 11.7–15.7)
LACTATE BLD-SCNC: 1.3 MMOL/L (ref 0.7–2)
OXYHGB MFR BLDA: 94 % (ref 92–100)
PCO2 BLDA: 44 MM HG (ref 35–45)
PH BLDA: 7.43 [PH] (ref 7.35–7.45)
PO2 BLDA: 68 MM HG (ref 80–105)
POTASSIUM BLD-SCNC: 4.3 MMOL/L (ref 3.4–5.3)
SAO2 % BLDA: 95 % (ref 96–97)
SODIUM BLD-SCNC: 138 MMOL/L (ref 135–145)

## 2025-04-30 PROCEDURE — 84295 ASSAY OF SERUM SODIUM: CPT

## 2025-04-30 PROCEDURE — 999N000150 HC STATISTIC PT MED CONFERENCE < 30 MIN

## 2025-04-30 PROCEDURE — 999N000125 HC STATISTIC PATIENT MED CONFERENCE < 30 MIN: Performed by: OCCUPATIONAL THERAPIST

## 2025-04-30 PROCEDURE — 94799 UNLISTED PULMONARY SVC/PX: CPT

## 2025-04-30 PROCEDURE — 97110 THERAPEUTIC EXERCISES: CPT | Mod: GO | Performed by: OCCUPATIONAL THERAPIST

## 2025-04-30 PROCEDURE — 999N000157 HC STATISTIC RCP TIME EA 10 MIN

## 2025-04-30 PROCEDURE — 250N000013 HC RX MED GY IP 250 OP 250 PS 637: Performed by: INTERNAL MEDICINE

## 2025-04-30 PROCEDURE — 97110 THERAPEUTIC EXERCISES: CPT | Mod: GP | Performed by: PHYSICAL THERAPIST

## 2025-04-30 PROCEDURE — 120N000017 HC R&B RESPIRATORY CARE

## 2025-04-30 PROCEDURE — 97530 THERAPEUTIC ACTIVITIES: CPT | Mod: GP | Performed by: PHYSICAL THERAPIST

## 2025-04-30 PROCEDURE — 250N000013 HC RX MED GY IP 250 OP 250 PS 637: Performed by: STUDENT IN AN ORGANIZED HEALTH CARE EDUCATION/TRAINING PROGRAM

## 2025-04-30 PROCEDURE — 99233 SBSQ HOSP IP/OBS HIGH 50: CPT | Performed by: STUDENT IN AN ORGANIZED HEALTH CARE EDUCATION/TRAINING PROGRAM

## 2025-04-30 PROCEDURE — 36600 WITHDRAWAL OF ARTERIAL BLOOD: CPT

## 2025-04-30 RX ADMIN — APIXABAN 5 MG: 5 TABLET, FILM COATED ORAL at 21:07

## 2025-04-30 RX ADMIN — APIXABAN 5 MG: 5 TABLET, FILM COATED ORAL at 08:20

## 2025-04-30 RX ADMIN — UMECLIDINIUM BROMIDE AND VILANTEROL TRIFENATATE 1 PUFF: 62.5; 25 POWDER RESPIRATORY (INHALATION) at 08:21

## 2025-04-30 RX ADMIN — ATORVASTATIN CALCIUM 40 MG: 40 TABLET, FILM COATED ORAL at 21:07

## 2025-04-30 RX ADMIN — INSULIN ASPART 3 UNITS: 100 INJECTION, SOLUTION INTRAVENOUS; SUBCUTANEOUS at 17:26

## 2025-04-30 RX ADMIN — INSULIN ASPART 14 UNITS: 100 INJECTION, SOLUTION INTRAVENOUS; SUBCUTANEOUS at 17:52

## 2025-04-30 RX ADMIN — ANORECTAL OINTMENT: 15.7; .44; 24; 20.6 OINTMENT TOPICAL at 21:11

## 2025-04-30 RX ADMIN — ANORECTAL OINTMENT: 15.7; .44; 24; 20.6 OINTMENT TOPICAL at 08:21

## 2025-04-30 RX ADMIN — INSULIN ASPART: 100 INJECTION, SOLUTION INTRAVENOUS; SUBCUTANEOUS at 12:17

## 2025-04-30 RX ADMIN — INSULIN ASPART 18 UNITS: 100 INJECTION, SOLUTION INTRAVENOUS; SUBCUTANEOUS at 09:24

## 2025-04-30 RX ADMIN — AMLODIPINE BESYLATE 5 MG: 5 TABLET ORAL at 08:21

## 2025-04-30 RX ADMIN — Medication 1 TABLET: at 08:20

## 2025-04-30 RX ADMIN — ANORECTAL OINTMENT: 15.7; .44; 24; 20.6 OINTMENT TOPICAL at 15:01

## 2025-04-30 RX ADMIN — INSULIN GLARGINE 32 UNITS: 100 INJECTION, SOLUTION SUBCUTANEOUS at 21:12

## 2025-04-30 RX ADMIN — INSULIN ASPART 1 UNITS: 100 INJECTION, SOLUTION INTRAVENOUS; SUBCUTANEOUS at 12:15

## 2025-04-30 RX ADMIN — INSULIN ASPART 2 UNITS: 100 INJECTION, SOLUTION INTRAVENOUS; SUBCUTANEOUS at 08:20

## 2025-04-30 ASSESSMENT — ACTIVITIES OF DAILY LIVING (ADL)
ADLS_ACUITY_SCORE: 57
ADLS_ACUITY_SCORE: 58
ADLS_ACUITY_SCORE: 57

## 2025-04-30 NOTE — PROGRESS NOTES
REHAB CARE PROGRESSION MEETING:    Medical Team Conference:  PT present for 15 minutes.  See progress noted dated today lelia by RN Care Coordinator for details. Patient and family via phone were present for conference.     Arik Suero, PT, WCC, CLT

## 2025-04-30 NOTE — PLAN OF CARE
Pt currently remains on 3 L of oxygen via nasal canula and tolerating ok.  BiPAP on hold per order.   Blood pressure 119/65, pulse 100, temperature 98.3  F (36.8  C), temperature source Oral, resp. rate 22, weight 83 kg (182 lb 14.4 oz), SpO2 93%.  RT will continue to follow and monitor.    ABG this am is    Latest Reference Range & Units 04/30/25 06:13   pH Arterial POCT 7.35 - 7.45  7.43   pCO2 Arterial POCT 35 - 45 mm Hg 44   pO2 Arterial POCT 80 - 105 mm Hg 68 (L)   O2 Sat, Arterial POCT 96 - 97 % 95 (L)   Bicarbonate Arterial POCT 21 - 28 mmol/L 29 (H)   Base Excess/Deficit (+/-) POCT -3.0 - 3.0 mmol/L 4.6 (H)   Oxyhemoglobin Arterial POCT 92 - 100 % 94

## 2025-04-30 NOTE — PLAN OF CARE
Problem: Adult Inpatient Plan of Care  Goal: Optimal Comfort and Wellbeing  Outcome: Progressing     Problem: Adult Inpatient Plan of Care  Goal: Optimal Comfort and Wellbeing  Outcome: Progressing     Problem: Wound  Goal: Improved Oral Intake  Outcome: Progressing   Patient spent the shift with no new complains/concerns, denies pains/discomfort. Attended all scheduled physical and occupational therapies.

## 2025-04-30 NOTE — PROGRESS NOTES
Pulmonary update    ABG this am reviewed  Recent Labs   Lab 04/30/25  0613 04/28/25  0521 04/24/25  0612 04/23/25  1503   PH 7.43 7.44 7.45 7.48*   PCO2 44 43 39 37   PO2 68* 72* 68* 66*   HCO3 29* 28 27 28     Still does not qualify for BiPAP with current pCO2 level.      - Continue to hold BiPAP  - Given the patient is not set to discharge this week, will put in a repeat ABG for next week.  May obtain sooner if clinical change.   - Our service will continue to follow     Bonilla Miller CNP  Pulmonary Medicine  [facility]  Pager [phone]  Office: [phone]

## 2025-04-30 NOTE — PLAN OF CARE
Problem: Adult Inpatient Plan of Care  Goal: Plan of Care Review  Description: The Plan of Care Review/Shift note should be completed every shift.  The Outcome Evaluation is a brief statement about your assessment that the patient is improving, declining, or no change.  This information will be displayed automatically on your shiftnote.  Outcome: Progressing  Goal: Absence of Hospital-Acquired Illness or Injury  Intervention: Identify and Manage Fall Risk  Recent Flowsheet Documentation  Taken 4/30/2025 0000 by Shraddha Galeano RN  Safety Promotion/Fall Prevention: activity supervised  Intervention: Prevent Infection  Recent Flowsheet Documentation  Taken 4/30/2025 0000 by Shraddha Galeano RN  Infection Prevention: hand hygiene promoted   Goal Outcome Evaluation:       Patient was A/O  x 4 on  3 liters of Nasal  cannula . Patient denied pain and slept most of the shift, wound vac was intact /65 (BP Location: Left arm)   Pulse 100   Temp 98.3  F (36.8  C) (Oral)   Resp 22   Wt 83.4 kg (183 lb 14.4 oz)   SpO2 93%   BMI 30.60 kg/m  .

## 2025-04-30 NOTE — PROGRESS NOTES
[facility]    Medicine Progress Note - Hospitalist Service    Date of Admission:  4/11/2025  Brief Course Prior to [facility]:  59 up F with h/o intellectual disability & memory issues, COPD, untreated ADEEL, tobacco use d/o, HTN, pAFib, & poorly controlled T2DM s/p previous hospitalization for DKA (3/18 - 3/20) who presented second time to hospital in [place] on 3/22/2025 with complaints of dyspnea, hyperglycemia, and chest pain. She was found to have an YADIRA, LLL PE, DKA, and A-fib with RVR. She was started on an insulin drip, therapeutic dose enoxaparin, Zosyn for possible pneumonia, and diltiazem drip for management of her RVR. Her DKA resolved, she was switched from diltiazem to amiodarone drip for management of A-fib with RVR; however, her degree of respiratory failure was still concerning and she was intubated 3/23 for management of worsening hypoxia.   She had a repeat CTA that demonstrated progression of her PE and RLL collapse concerning for mucous plugging and pneumonia.   She was switched from enoxaparin to heparin drip and her antibiotics were broadened to vancomycin and Zosyn. She was on norepinephrine for several days d/t hypotension attributed to sedation & sepsis-related vasoplegia. It appears that she has been doing fairly long SBTs since 3/26, which seemed to be going reasonably well per RT documentation. Primary team has been having issues w/ progressively increasing FiO2 needs. She had a sputum culture grow Candida & has been on fluconazole since 3/25. Transferred to [facility] d/t concerns that she might require a tracheostomy secondary to her inability to liberate from the vent.   At [facility] patient was admitted to the ICU on a ventilator, successfully extubated 4/1 and downgraded to floor status 4/3.  Infectious disease consulted and continued patient on Zosyn course to be completed through 4/17/2025.  General surgery consulted for right keiry/perineal abscess and performed incision and  drainage with Penrose drain placement 3/31 with subsequent repeat I&D 4/2 and 4/8.  Surgery requests wound VAC be changed by wound care Tuesdays and Thursdays.     LTACH Course:  She transferred from [facility] to [place] for wound cares, therapies, and complex medical care.   4/12-4/14: Needing BiPAP overnight.  4/15-4/20: successfully weaned off NC O2 during the day, completed zosyn 4/17 4/21-4/27- wound is closing well - [facility] plan to remove penrose drain on 4/29/2025. Consider transfer to [facility] for ongoing therapy and wound care.  4/28-5/3:  New information regarding placement needs surfaced early in the week, likely needing group home. Wound vac remains--penrose drain removed--packing to tract along/under R labia. Care conference completed 4/30.    BARRIERS TO DISCHARGE (why care is unable to be provided at a lower level of care):    Large right groin wound requiring frequent cares, wound vac, IV abx, nocturnal BIPAP     Things to follow up on:  -unclear what will be best outpatient diabetes regimen as outpatient due to intellectual disability    Assessment & Plan   Acute on Chronic Hypoxic Respiratory Failure  VAP, PTA - resolved  COPD  ADEEL  Transferred from [place] for concerns of tracheostomy need, was able to liberate from ventilator withOUT tracheostomy formation. Weaned to minimal supplemental oxygen support (2-3L nasal).  - Completed zosyn 4/17  - inhalers as needed  - RT following     Perineal Abscess  Condyloma Acuminata  s/p acyclovir and I&D x3  - zosyn completed 4/17  - rectal tube discontinued  - noguera for wound protection  - VAC in place  - Continue wound care per [facility] nurse     PE, Acute, segmental, subsegmental  -apixaban 5mg PO BID    Seborrheic dermatitis:  Ketoconazole cream to face     T2DM  Poorly controlled as outpatient with A1c 12.7. Used to be on insulin pump. No longer on it due to intellectual delay.  - BG labile earlier during admission  - Increase Lantus 30 unit(s)->32  units qhs  - I:C of 1:5 -> I:C 1:4 with meals  - ISS high     CKD3b  - Creatinine stable. Monitor     Elevated Hgb in past  Historically high  Consistent with smoking (high CO), untreated ADEEL     History of intellectual delay: supportive care.    PROBLEMS MANAGED During this or previous Encounter Now Stable but Monitoring:    Reactive thrombocytosis:  Platelets historically in normal range (reviewed last 3 years)  Peaked at 700,000, now resolved  Likely  reactive - reviewed flow chart for acute thrombocytosis, posted in note  Normal peripheral smear, liver enzymes           Diet: Snacks/Supplements Adult: Expedite Cup; With Meals  Snacks/Supplements Adult: Magic Cup; With Meals  Combination Diet Moderate Consistent Carb (60 g CHO per Meal) Diet; Easy to Chew (level 7); Thin Liquids (level 0)  Snacks/Supplements Adult: Gelatein 20 (sugar-free); With Meals    DVT Prophylaxis: DOAC  Landrum Catheter: PRESENT, indication: Wound deterioration and failed external collection device  Lines: None     Cardiac Monitoring: None  Code Status: Full Code      Clinically Significant Risk Factors               # Hypoalbuminemia: Lowest albumin = 2.9 g/dL at 4/13/2025  2:14 AM, will monitor as appropriate     # Hypertension: Noted on problem list           # DMII: A1C = 12.7 % (Ref range: <5.7 %) within past 6 months       # COPD: noted on problem list        Social Drivers of Health    Tobacco Use: Medium Risk (4/2/2025)    Patient History     Smoking Tobacco Use: Former     Smokeless Tobacco Use: Never     Passive Exposure: Current   Physical Activity: Unknown (5/16/2024)    Exercise Vital Sign     Days of Exercise per Week: 0 days   Social Connections: Unknown (5/16/2024)    Social Connection and Isolation Panel [NHANES]     Frequency of Social Gatherings with Friends and Family: More than three times a week          Disposition Plan     Medically Ready for Discharge: Anticipated in 5+ Days         Alfred Patel,  MD  Hospitalist Service  LTACH  Securely message with [employer] (more info)  Text page via [employer] Paging/Directory   ______________________________________________________________________    Interval History   NO overnight events. Wound healing well, penrose drain removed and wound care refined to vac with packing down tract along R labia.    Physical Exam   Vital Signs: Temp: 98.5  F (36.9  C) Temp src: Oral BP: 130/71 Pulse: 88   Resp: 16 SpO2: 94 % O2 Device: Nasal cannula Oxygen Delivery: 3 LPM  Weight: 183 lbs 14.4 oz    General Appearance: Older F in NAD  Respiratory:  rhonchi bilaterally  Cardiovascular: RRR S1S2  GI: soft, NT  Skin: Face with areas of somewhat inflamed acne. Also wide-spread seborrheic dermatitis.  Large right groin wound - see WOC note - dressing in place  Neuro: Alert, generally nonfocal on motor and sensory testing      Medical Decision Making       50 MINUTES SPENT BY ME on the date of service doing chart review, history, exam, documentation & further activities per the note.      Data     I have personally reviewed the following data over the past 24 hrs:    N/A  \   11.5 (L)   / N/A     138 N/A N/A /  179 (H)   4.3 N/A N/A \     Procal: N/A CRP: N/A Lactic Acid: 1.3         Imaging results reviewed over the past 24 hrs:   No results found for this or any previous visit (from the past 24 hours).

## 2025-04-30 NOTE — PROGRESS NOTES
CLINICAL NUTRITION SERVICES - REASSESSMENT NOTE     RECOMMENDATIONS FOR MDs/PROVIDERS TO ORDER:  None    Registered Dietitian Interventions:  Increase gel sgf to BID to help meet increased protein needs     Future/Additional Recommendations:  Continue to monitor PO intakes, wounds, and weight trends     INFORMATION OBTAINED  Assessed patient in room. Pt reports she likes the supplements and is wiling to increase gel supplement to help meet increased protein needs    CURRENT NUTRITION ORDERS  Diet: Orders Placed This Encounter      Combination Diet Moderate Consistent Carb (60 g CHO per Meal) Diet; Easy to Chew (level 7); Thin Liquids (level 0)    Snacks/Supplements: Gelatein 20 daily, Magic Cup daily, and Expedite cup      CURRENT INTAKE/TOLERANCE  Patient eating well, 100% of meals and supplements.   With meals and supplements pt receiving 1813-0584 kcal, 79-90 g protein/day, meeting 100% of estimated kcal, 75-85% of estimated protein needs    NEW FINDINGS  -Attended FCC with pt, and family over the phone    GI symptoms:2 formed BM yesterday    Skin/wounds:  Right groin surgical wound improving, Stage 2 bilateral PI   - improving per WOC 4/29    Nutrition-relevant labs: Hypoglycemia x 1 yesterday- pt ate a good lunch- no insulin changes at this time  Recent Labs   Lab 04/30/25  0805 04/30/25  0613 04/29/25  2127 04/29/25  1738 04/29/25  1704 04/29/25  1142   * 182* 272* 82 62* 188*     Nutrition-relevant medications:  novolog, lantus    Weight: relatively stable this admission  183 lbs 14.4 oz      ASSESSED NUTRITION NEEDS  Dosing Weight: 85.5 kg, based on actual wt  Estimated Energy Needs: 8299-0236 kcals/day (20 - 25 kcals/kg)  Justification: Obese  Estimated Protein Needs: 102-128 grams protein/day (1.2 - 1.5 grams of pro/kg)  Justification: CKD and Wound healing  Estimated Fluid Needs: 7158-2245 mL/day (1 mL/kcal)  Justification: Maintenance    MALNUTRITION  % Intake: No decreased intake noted  % Weight  Loss: Weight loss does not meet criteria   Subcutaneous Fat Loss: None observed  Muscle Loss: None observed  Fluid Accumulation/Edema: Moderate to severe, 2-4+  Malnutrition Diagnosis: Patient does not meet two of the established criteria necessary for diagnosing malnutrition  Malnutrition Present on Admission: No    EVALUATION OF THE PROGRESS TOWARD GOALS     NUTRITION DIAGNOSIS  Increased nutrient needs (protein) related to wound healing as evidenced by R-groin surgical wound, stg 2 PI bilateral buttocks.    GOALS  - Blood glucose 140-180 mg/dL- not met  - Patient to consume % of nutritionally adequate meal trays TID, or the equivalent with supplements/snacks- met  - Wound healing per WOC documentation- progressing     MONITORING/EVALUATION  Progress toward goals will be monitored and evaluated per policy.

## 2025-04-30 NOTE — TREATMENT PLAN
RESPIRATORY CARE NOTE    Pt on 2lnc bs clear and diminished, strong dry cough. Pt does FV and IS with good technique, just needs reminder when to do it.    Cont on nc, titrate as able to ra  ABG on Monday 5/5 in am    Josy Correa

## 2025-04-30 NOTE — CARE CONFERENCE
"Care progression meeting in patient's room from  am today.    Family present: sister, Mary, and sig other, Tavo were both present via phone. (Did attempt to do a merged call to both family members, but the phone kept hanging up - so SAMAN Alexandre, called one of the family members on her cell and this writer called the other family member.)    Staff present: Tricia-OT; Nicho-PT; Bettie-SAMAN; Dr Patel-Hospitalist and Therese-RN CC    OT: Tricia, Occupational Therapist, shared that she has been working with patient since she came to [facility], focusing on upper body strengthening and everyday ADLs like bathing and brushing teeth, and they also did some cognitive testing.  OT said that patient has been doing better this week; she seems brighter, needing less encouragement and moving around better.  Patient is able to sit on the edge of the bed and with OT setting up, she completes her ADLS.  Patient needs a little bit of help with upper body dressing-patient does 75% and OT does 25%.  Patient needs more assistance with lower body dressing, about 50% effort by patient with 50% assist of therapist. OT says patient is moving in the right direction. OT shared the results of a cognitive test from two weeks ago, the ACE-III with score of 57/100, a score < 81/100 indicates cognitive  impairment. OT plans to do some more cognitive assessment with patient this week.  OT did state that the cognitive assessments are done so that the therapists know if teaching needs to be simplified in some way, like repetition or other changes to help patient learn strategies.   Tricia asked if there were any questions:  Sister, Mary, asked if we know when patient will be discharging.  She also said that she wants Marly to go to [place], with Tavo, because sister is planning to move out of state, and wants her sister to have the support of her sig. Other.  Mary said she loves Tavo, \"like a brother.\"      PT: Nicho, Physical Therapist, " shared that he has been working with patient 5-6 x week and he also agrees that patient is brighter this week.  PT is focused on patient's general mobility and lower extremity exercises to help increase her strength and endurance,.  Patient is stand by assist for sit to stand, and she requires a WC to follow when she is walking, but no loss of balance noted.  Patient does get fatigued at about 80', but can go 100' and PT notes that patient was only able to walk 30' when they first started.  PT encourages patient to keep pushing herself to get stronger.  He did discuss her usual pace and reminded Marly that she needs to be able to walk from [address] (In [place]).  Nicho asked if there were any questions:  there were no additional questions.    RD: Bettie, Registered Dietician, shared that she is helping to manage nutrition to help Marly heal and get stronger.  RD explained that patient needs extra nutrients to support wound healing; extra protein, from high protein jello and ice cream are helping to supplement this.  RD asked if patient likes the supplements and Marly said yes she does.  Bettie encouraged Marly to add an additional jello or ice cream and patient said she will.  Bettie asked if there were questions: family wanted to know what is Marly's current weight?  Bettie answered 183#.  RD explained that at this time, the goal is for patient to maintain her weight at this time.   Mary joked with patient and team that her sister loves saurkraut and onions.  (Patient said she doesn't like them!)  Bettie also said that patient is on a steady carb diet, to help keep blood sugars under control. Bettie asked if there were any other questions; there were none.    MD: Dr Alfred Patel, Hospitalist, shared they patient is doing well and moving in the right direction.  He anticipates that patient may be able to move to TCU in 10-14 days.  Hopefully we will be able to find a TCU in or near [place] -  patient currently has a wound vac and twice daily wound cares.  He says that if we can get wound cares to 1 x day, then she would be ready to transfer. Patient is currently on 3 L/min O2.  MD asked if there were any other questions:  this writer asked if patient will be using bipap at discharge. MD will need to check with Pulm - bipap was on hold due to pressure area on bridge of patient's nose last week.  Tavo said that he hopes patient can go to [facility] in [place] as it is close to where patient and Tavo live.      RN-CC; This writer did visit with patient for a couple minutes at end of care conference.  Instructed patient that we will wait until she is ready to go before we send out referrals, but that were will certainly send a referral to [facility] and the other care facilities near [place] when she's closer to discharge from [facility]..       Therese Woodson RN, BSN  Care Coordination  [facility]  [phone]

## 2025-04-30 NOTE — PLAN OF CARE
Goal Outcome Evaluation:      Plan of Care Reviewed With: patient    Overall Patient Progress: no change Overall Patient Progress: no change    Outcome Evaluation: Pt VSS, occasionally tachy. Blood sugar was in the low 60s at 1711, had PRN glucose gel and ate 100% dinner, rechecked at 1735 and was at 82, held evening aspart. MD notified. BS at 2130 is 272. Alert, cooperative. No complains of pain. On RA, with 3 liter NC, tolerating well. Last BM today. Landrum output adequate. Wounds intact, vac at 125mmhg, minimal output, wound care done this morning.    /71 (BP Location: Left arm)   Pulse 86   Temp 98.4  F (36.9  C) (Oral)   Resp 20   Wt 83 kg (182 lb 14.4 oz)   SpO2 92%   BMI 30.44 kg/m       Marietta Ring RN

## 2025-04-30 NOTE — PROGRESS NOTES
REHAB CARE PROGRESSION MEETING:    Medical Team Conference:  OT present for 15 minutes.  See progress noted dated today lelia by RN Care Coordinator for details. Pt present for conference, significant other and sister present via phone.    Tricia Dior OTR/DONA

## 2025-05-01 ENCOUNTER — APPOINTMENT (OUTPATIENT)
Dept: OCCUPATIONAL THERAPY | Facility: CLINIC | Age: 60
DRG: 602 | End: 2025-05-01
Attending: INTERNAL MEDICINE
Payer: COMMERCIAL

## 2025-05-01 ENCOUNTER — APPOINTMENT (OUTPATIENT)
Dept: PHYSICAL THERAPY | Facility: CLINIC | Age: 60
DRG: 602 | End: 2025-05-01
Attending: INTERNAL MEDICINE
Payer: COMMERCIAL

## 2025-05-01 LAB
GLUCOSE BLDC GLUCOMTR-MCNC: 148 MG/DL (ref 70–99)
GLUCOSE BLDC GLUCOMTR-MCNC: 161 MG/DL (ref 70–99)
GLUCOSE BLDC GLUCOMTR-MCNC: 92 MG/DL (ref 70–99)
GLUCOSE BLDC GLUCOMTR-MCNC: 93 MG/DL (ref 70–99)

## 2025-05-01 PROCEDURE — 120N000017 HC R&B RESPIRATORY CARE

## 2025-05-01 PROCEDURE — 97110 THERAPEUTIC EXERCISES: CPT | Mod: GO | Performed by: OCCUPATIONAL THERAPIST

## 2025-05-01 PROCEDURE — 250N000013 HC RX MED GY IP 250 OP 250 PS 637: Performed by: STUDENT IN AN ORGANIZED HEALTH CARE EDUCATION/TRAINING PROGRAM

## 2025-05-01 PROCEDURE — 97530 THERAPEUTIC ACTIVITIES: CPT | Mod: GP

## 2025-05-01 PROCEDURE — 99233 SBSQ HOSP IP/OBS HIGH 50: CPT | Performed by: STUDENT IN AN ORGANIZED HEALTH CARE EDUCATION/TRAINING PROGRAM

## 2025-05-01 PROCEDURE — 999N000157 HC STATISTIC RCP TIME EA 10 MIN

## 2025-05-01 PROCEDURE — 94799 UNLISTED PULMONARY SVC/PX: CPT

## 2025-05-01 PROCEDURE — 250N000013 HC RX MED GY IP 250 OP 250 PS 637: Performed by: INTERNAL MEDICINE

## 2025-05-01 PROCEDURE — 97116 GAIT TRAINING THERAPY: CPT | Mod: GP

## 2025-05-01 RX ADMIN — INSULIN ASPART 16 UNITS: 100 INJECTION, SOLUTION INTRAVENOUS; SUBCUTANEOUS at 08:34

## 2025-05-01 RX ADMIN — APIXABAN 5 MG: 5 TABLET, FILM COATED ORAL at 21:11

## 2025-05-01 RX ADMIN — INSULIN ASPART 1 UNITS: 100 INJECTION, SOLUTION INTRAVENOUS; SUBCUTANEOUS at 12:42

## 2025-05-01 RX ADMIN — UMECLIDINIUM BROMIDE AND VILANTEROL TRIFENATATE 1 PUFF: 62.5; 25 POWDER RESPIRATORY (INHALATION) at 08:21

## 2025-05-01 RX ADMIN — ATORVASTATIN CALCIUM 40 MG: 40 TABLET, FILM COATED ORAL at 21:11

## 2025-05-01 RX ADMIN — AMLODIPINE BESYLATE 5 MG: 5 TABLET ORAL at 08:19

## 2025-05-01 RX ADMIN — INSULIN ASPART 23 UNITS: 100 INJECTION, SOLUTION INTRAVENOUS; SUBCUTANEOUS at 12:42

## 2025-05-01 RX ADMIN — ANORECTAL OINTMENT: 15.7; .44; 24; 20.6 OINTMENT TOPICAL at 08:21

## 2025-05-01 RX ADMIN — APIXABAN 5 MG: 5 TABLET, FILM COATED ORAL at 08:19

## 2025-05-01 RX ADMIN — INSULIN ASPART 1 UNITS: 100 INJECTION, SOLUTION INTRAVENOUS; SUBCUTANEOUS at 08:31

## 2025-05-01 RX ADMIN — INSULIN GLARGINE 32 UNITS: 100 INJECTION, SOLUTION SUBCUTANEOUS at 21:22

## 2025-05-01 RX ADMIN — ANORECTAL OINTMENT: 15.7; .44; 24; 20.6 OINTMENT TOPICAL at 21:18

## 2025-05-01 RX ADMIN — INSULIN ASPART 12 UNITS: 100 INJECTION, SOLUTION INTRAVENOUS; SUBCUTANEOUS at 17:38

## 2025-05-01 RX ADMIN — Medication 1 TABLET: at 08:19

## 2025-05-01 RX ADMIN — ANORECTAL OINTMENT: 15.7; .44; 24; 20.6 OINTMENT TOPICAL at 14:24

## 2025-05-01 ASSESSMENT — ACTIVITIES OF DAILY LIVING (ADL)
ADLS_ACUITY_SCORE: 57
ADLS_ACUITY_SCORE: 57
ADLS_ACUITY_SCORE: 58
ADLS_ACUITY_SCORE: 57
ADLS_ACUITY_SCORE: 58
ADLS_ACUITY_SCORE: 58
ADLS_ACUITY_SCORE: 57
ADLS_ACUITY_SCORE: 58
ADLS_ACUITY_SCORE: 57
ADLS_ACUITY_SCORE: 62
ADLS_ACUITY_SCORE: 57
ADLS_ACUITY_SCORE: 58
ADLS_ACUITY_SCORE: 57
ADLS_ACUITY_SCORE: 58
ADLS_ACUITY_SCORE: 63
ADLS_ACUITY_SCORE: 57
ADLS_ACUITY_SCORE: 57
ADLS_ACUITY_SCORE: 58
ADLS_ACUITY_SCORE: 63
ADLS_ACUITY_SCORE: 57
ADLS_ACUITY_SCORE: 62

## 2025-05-01 NOTE — PROGRESS NOTES
[facility]    Medicine Progress Note - Hospitalist Service    Date of Admission:  4/11/2025  Brief Course Prior to [facility]:  59 up F with h/o intellectual disability & memory issues, COPD, untreated ADEEL, tobacco use d/o, HTN, pAFib, & poorly controlled T2DM s/p previous hospitalization for DKA (3/18 - 3/20) who presented second time to hospital in [place] on 3/22/2025 with complaints of dyspnea, hyperglycemia, and chest pain. She was found to have an YADIRA, LLL PE, DKA, and A-fib with RVR. She was started on an insulin drip, therapeutic dose enoxaparin, Zosyn for possible pneumonia, and diltiazem drip for management of her RVR. Her DKA resolved, she was switched from diltiazem to amiodarone drip for management of A-fib with RVR; however, her degree of respiratory failure was still concerning and she was intubated 3/23 for management of worsening hypoxia.   She had a repeat CTA that demonstrated progression of her PE and RLL collapse concerning for mucous plugging and pneumonia.   She was switched from enoxaparin to heparin drip and her antibiotics were broadened to vancomycin and Zosyn. She was on norepinephrine for several days d/t hypotension attributed to sedation & sepsis-related vasoplegia. It appears that she has been doing fairly long SBTs since 3/26, which seemed to be going reasonably well per RT documentation. Primary team has been having issues w/ progressively increasing FiO2 needs. She had a sputum culture grow Candida & has been on fluconazole since 3/25. Transferred to [facility] d/t concerns that she might require a tracheostomy secondary to her inability to liberate from the vent.   At [facility] patient was admitted to the ICU on a ventilator, successfully extubated 4/1 and downgraded to floor status 4/3.  Infectious disease consulted and continued patient on Zosyn course to be completed through 4/17/2025.  General surgery consulted for right keiry/perineal abscess and performed incision and  drainage with Penrose drain placement 3/31 with subsequent repeat I&D 4/2 and 4/8.  Surgery requests wound VAC be changed by wound care Tuesdays and Thursdays.     LTACH Course:  She transferred from [facility] to [place] for wound cares, therapies, and complex medical care.   4/12-4/14: Needing BiPAP overnight.  4/15-4/20: successfully weaned off NC O2 during the day, completed zosyn 4/17 4/21-4/27- wound is closing well - [facility] plan to remove penrose drain on 4/29/2025. Consider transfer to [facility] TCU for ongoing therapy and wound care.  4/28-5/3:  New information regarding placement needs surfaced early in the week, likely needing group home. Wound vac remains--penrose drain removed--packing to tract along/under R labia. Care conference completed 4/30.    BARRIERS TO DISCHARGE (why care is unable to be provided at a lower level of care):    Large right groin wound requiring frequent cares, wound vac, IV abx, nocturnal BIPAP     Things to follow up on:  -unclear what will be best outpatient diabetes regimen as outpatient due to intellectual disability    Assessment & Plan   Acute on Chronic Hypoxic Respiratory Failure  VAP, PTA - resolved  COPD  ADEEL  Transferred from [place] for concerns of tracheostomy need, was able to liberate from ventilator withOUT tracheostomy formation. Weaned to minimal supplemental oxygen support (2-3L nasal).  - Completed zosyn 4/17  - inhalers as needed  - RT following     Perineal Abscess  Condyloma Acuminata  s/p acyclovir and I&D x3  - zosyn completed 4/17  - rectal tube discontinued  - noguera for wound protection  - VAC in place  - Continue wound care per [facility] nurse     PE, Acute, segmental, subsegmental  -apixaban 5mg PO BID    Seborrheic dermatitis:  Ketoconazole cream to face     T2DM  Poorly controlled as outpatient with A1c 12.7. Used to be on insulin pump. No longer on it due to intellectual delay.  - BG labile earlier during admission  - Increase Lantus 30 unit(s)->32  units qhs  - I:C of 1:5 -> I:C 1:4 with meals  - ISS high     CKD3b  - Creatinine stable. Monitor     Elevated Hgb in past  Historically high  Consistent with smoking (high CO), untreated ADEEL     History of intellectual delay: supportive care.    PROBLEMS MANAGED During this or previous Encounter Now Stable but Monitoring:    Reactive thrombocytosis:  Platelets historically in normal range (reviewed last 3 years)  Peaked at 700,000, now resolved  Likely  reactive - reviewed flow chart for acute thrombocytosis, posted in note  Normal peripheral smear, liver enzymes           Diet: Snacks/Supplements Adult: Expedite Cup; With Meals  Snacks/Supplements Adult: Magic Cup; With Meals  Combination Diet Moderate Consistent Carb (60 g CHO per Meal) Diet; Easy to Chew (level 7); Thin Liquids (level 0)  Snacks/Supplements Adult: Gelatein 20 (sugar-free); With Meals    DVT Prophylaxis: DOAC  Landrum Catheter: PRESENT, indication: Wound deterioration and failed external collection device  Lines: None     Cardiac Monitoring: None  Code Status: Full Code      Clinically Significant Risk Factors               # Hypoalbuminemia: Lowest albumin = 2.9 g/dL at 4/13/2025  2:14 AM, will monitor as appropriate     # Hypertension: Noted on problem list           # DMII: A1C = 12.7 % (Ref range: <5.7 %) within past 6 months       # COPD: noted on problem list        Social Drivers of Health    Tobacco Use: Medium Risk (4/2/2025)    Patient History     Smoking Tobacco Use: Former     Smokeless Tobacco Use: Never     Passive Exposure: Current   Physical Activity: Unknown (5/16/2024)    Exercise Vital Sign     Days of Exercise per Week: 0 days   Social Connections: Unknown (5/16/2024)    Social Connection and Isolation Panel [NHANES]     Frequency of Social Gatherings with Friends and Family: More than three times a week          Disposition Plan     Medically Ready for Discharge: Anticipated in 5+ Days         Alfred Patel,  MD  Hospitalist Service  LTACH  Securely message with [employer] (more info)  Text page via [employer] Paging/Directory   ______________________________________________________________________    Interval History   NO overnight events. Hyperglycemia, though highly variable oral intake requiring wildly variable carb-count insulin corrections.    Physical Exam   Vital Signs: Temp: 98.6  F (37  C) Temp src: Oral BP: 112/69 Pulse: 73   Resp: 24 SpO2: 97 % O2 Device: Nasal cannula with humidification Oxygen Delivery: 2 LPM  Weight: 183 lbs 9.6 oz    General Appearance: Older F in NAD  Respiratory:  rhonchi bilaterally  Cardiovascular: RRR S1S2  GI: soft, NT  Skin: Face with areas of somewhat inflamed acne. Also wide-spread seborrheic dermatitis.  Large right groin wound - see WOC note - dressing in place  Neuro: Alert, generally nonfocal on motor and sensory testing      Medical Decision Making       55 MINUTES SPENT BY ME on the date of service doing chart review, history, exam, documentation & further activities per the note.      Data         Imaging results reviewed over the past 24 hrs:   No results found for this or any previous visit (from the past 24 hours).

## 2025-05-01 NOTE — TREATMENT PLAN
RESPIRATORY CARE NOTE    Pt currently on 21%, bs clear, strong dry cough.   Did Flutter valve x1 2 set of 10 rep followed by IS x 10 rep    Titrate O2 as able.    Josy Rose RT

## 2025-05-01 NOTE — PLAN OF CARE
Problem: Adult Inpatient Plan of Care  Goal: Absence of Hospital-Acquired Illness or Injury  Outcome: Progressing  Intervention: Identify and Manage Fall Risk  Recent Flowsheet Documentation  Taken 5/1/2025 0800 by Roxana Harrell RN  Safety Promotion/Fall Prevention:   activity supervised   clutter free environment maintained   nonskid shoes/slippers when out of bed   room door open   room near nurse's station  Intervention: Prevent Infection  Recent Flowsheet Documentation  Taken 5/1/2025 0800 by Roxana Harrell RN  Infection Prevention: hand hygiene promoted     Problem: Pain Acute  Goal: Optimal Pain Control and Function  Outcome: Progressing  Intervention: Optimize Psychosocial Wellbeing  Recent Flowsheet Documentation  Taken 5/1/2025 0800 by Roxana Harrell RN  Diversional Activities: television  Intervention: Prevent or Manage Pain  Recent Flowsheet Documentation  Taken 5/1/2025 0800 by Roxana Harrell RN  Medication Review/Management: medications reviewed     Problem: Wound  Goal: Improved Oral Intake  Outcome: Progressing   Goal Outcome Evaluation:    Patient is alert and oriented. Presented as calm and cooperative. Denied pain. Ambulated to the bathroom and had a BM x3. Wound care completed on labial wound. Noguera catheter is patent. Catheter cares done. Tamper evidence seal on catheter is missing - it's uncertain when this happened. Doctor was notified and stated there is no need to replace noguera. Wound vac on right groin intact at 125 continuous. Vital signs stable. Temp: 98.6  F (37  C) Temp src: Oral BP: 112/69 Pulse: 73   Resp: 24 SpO2: 97 % O2 Device: Nasal cannula with humidification Oxygen Delivery: 2 LPM    Roxana Harrell RN

## 2025-05-01 NOTE — PROGRESS NOTES
04/30/25 1000   Appointment Info   Signing Clinician's Name / Credentials (PT) Mini Yeh, PT   Rehab Comments (PT) 3L NC, noguera, wound vac   Therapeutic Procedure/Exercise   Ther. Procedure: strength, endurance, ROM, flexibillity Minutes (84657) 25   Symptoms Noted During/After Treatment fatigue;shortness of breath   Treatment Detail/Skilled Intervention See Gait for TE.  Standing LE strengthening exs: Standing heel raises x 15 reps - pt achieves min heel clearance and fatigues after ~ 12 reps.  Standing marching in place: x 15 reps [place] VCs and modeling for increased hip flexion with min carry over.  Repetitive STS x 8 reps - pt self selecting single UE support to A with stands. O2 sats 95%  -118 bpm with exericses.   Therapeutic Activity   Therapeutic Activities: dynamic activities to improve functional performance Minutes (92064) 10   Symptoms Noted During/After Treatment None   Treatment Detail/Skilled Intervention Supine to sit: SBA. Sitting at EOB: Mod I. Max A to don brief.  Extended time to prep pt for out of room activity.  Sit to stand to FWW: SBA consistently throughout therapy. Stand pivot: SBA for line management and VCs for direction.   Gait Training   Gait Training Minutes (97488)   (Gait for TE)   Symptoms Noted During/After Treatment (Gait Training) shortness of breath   Treatment Detail/Skilled Intervention Amb with FWW with SBA and wc follow in room and in hallway. Pt walks at a slow pace but achieves step through gait.  Dyspnea limits gait tolerance. O2 sats 99%   bpm   Distance in Feet 125 ft, 120 ft, 80 ft   [place] Level (Gait Training) stand-by assist   Physical Assistance Level (Gait Training) 1 person assist   Weight Bearing (Gait Training) full weight-bearing   Assistive Device (Gait Training) rolling walker   PT Discharge Planning   PT Plan Bed mobility, transfers, gait, stairs, NuStep   PT Discharge Recommendation (DC Rec) Transitional Care Facility;home with home  care physical therapy   PT Rationale for DC Rec Pt requires assist for safe functional mobility.   PT Brief overview of current status Pt's O2 sats were WFL throughout therapy with elevated HR with activity. SOB limits all activity tolerance. Pt is SBA with all transfers and gait with FWW   Physical Therapy Time and Intention   Timed Code Treatment Minutes 35   Total Session Time (sum of timed and untimed services) 35     This note was created for Utilization Review purposes only and the services rendered in this note are not a reflection of services provided by this author.

## 2025-05-01 NOTE — PLAN OF CARE
Problem: Adult Inpatient Plan of Care  Goal: Absence of Hospital-Acquired Illness or Injury  Outcome: Progressing  Intervention: Identify and Manage Fall Risk  Recent Flowsheet Documentation  Taken 4/30/2025 1907 by Katty Dumont RN  Safety Promotion/Fall Prevention:   activity supervised   lighting adjusted   room door open   room near nurse's station   safety round/check completed  Intervention: Prevent Skin Injury  Recent Flowsheet Documentation  Taken 4/30/2025 2136 by Katty Dumont RN  Body Position:   turned   right  Taken 4/30/2025 1800 by Katty Dumont RN  Body Position: supine  Intervention: Prevent Infection  Recent Flowsheet Documentation  Taken 4/30/2025 1907 by Katty Dumont RN  Infection Prevention:   hand hygiene promoted   rest/sleep promoted     Problem: Adult Inpatient Plan of Care  Goal: Optimal Comfort and Wellbeing  Outcome: Progressing   Goal Outcome Evaluation:       Vital signs were stable. Pt. denied any pain and was comfortable for the shift. Pt. ate 100%of the supper. Right labia dressing was changed as it came off. Wound vac dressing was clean, dry and intact. Catheter cares done. BG were 282 & 260 for the shift. Continue to monitor.  /56 (BP Location: Left arm, Cuff Size: Adult Regular)   Pulse 92   Temp 98.3  F (36.8  C) (Oral)   Resp 20   Wt 83.4 kg (183 lb 14.4 oz)   SpO2 95%   BMI 30.60 kg/m       Katty Dumont RN

## 2025-05-01 NOTE — PLAN OF CARE
Problem: Adult Inpatient Plan of Care  Goal: Plan of Care Review  Description: The Plan of Care Review/Shift note should be completed every shift.  The Outcome Evaluation is a brief statement about your assessment that the patient is improving, declining, or no change.  This information will be displayed automatically on your shiftnote.  Outcome: Progressing  Goal: Absence of Hospital-Acquired Illness or Injury  Intervention: Identify and Manage Fall Risk  Recent Flowsheet Documentation  Taken 5/1/2025 0000 by Shraddha Galeano RN  Safety Promotion/Fall Prevention: activity supervised  Intervention: Prevent Infection  Recent Flowsheet Documentation  Taken 5/1/2025 0000 by Shraddha Galeano RN  Infection Prevention: hand hygiene promoted   Goal Outcome Evaluation:       Patient  was  A/O  x  4 on 3 liters of  Nasal cannula , patient denied pain and slept most of the shift patient wound vac dressing intact. All other patient needs were attended to. /59 (BP Location: Left arm)   Pulse 90   Temp 98.7  F (37.1  C) (Oral)   Resp 20   Wt 83.3 kg (183 lb 9.6 oz)   SpO2 94%   BMI 30.55 kg/m  .

## 2025-05-01 NOTE — PROGRESS NOTES
04/30/25 2712   Appointment Info   Signing Clinician's Name / Credentials (OT) Jose Antonio Martines, OTR/L   Therapeutic Procedures/Exercise   Therapeutic Procedure: strength, endurance, ROM, flexibillity minutes (16510) 38   Symptoms Noted During/After Treatment fatigue   Treatment Detail/Skilled Intervention Facilitated fctmn mobility and UE exerc to further safe IND. Ambulted to dept, rainbow arc w/ 1# wrist wt, floor to chest press   OT Discharge Planning   OT Plan 4/30: dressing including clothing retrieval, standing at sink for g/h, toileting in BR/toilet transfers, UB ex, bed mobility, bring reacher and sock aid to room for LB dressing.   OT Discharge Recommendation (DC Rec) Transitional Care Facility   OT Rationale for DC Rec pt may require further therapy prior to dc to home.   Total Session Time   Timed Code Treatment Minutes 38   Total Session Time (sum of timed and untimed services) 38     This note was created for Utilization Review purposes only and the services rendered in this note are not a reflection of services provided by this author.

## 2025-05-02 ENCOUNTER — APPOINTMENT (OUTPATIENT)
Dept: PHYSICAL THERAPY | Facility: CLINIC | Age: 60
DRG: 602 | End: 2025-05-02
Attending: INTERNAL MEDICINE
Payer: COMMERCIAL

## 2025-05-02 ENCOUNTER — APPOINTMENT (OUTPATIENT)
Dept: OCCUPATIONAL THERAPY | Facility: CLINIC | Age: 60
DRG: 602 | End: 2025-05-02
Attending: INTERNAL MEDICINE
Payer: COMMERCIAL

## 2025-05-02 VITALS
DIASTOLIC BLOOD PRESSURE: 62 MMHG | RESPIRATION RATE: 20 BRPM | SYSTOLIC BLOOD PRESSURE: 132 MMHG | WEIGHT: 183.6 LBS | OXYGEN SATURATION: 92 % | HEART RATE: 88 BPM | TEMPERATURE: 98.8 F | BODY MASS INDEX: 30.55 KG/M2

## 2025-05-02 LAB
CREAT SERPL-MCNC: 1.29 MG/DL (ref 0.51–0.95)
EGFRCR SERPLBLD CKD-EPI 2021: 47 ML/MIN/1.73M2
GLUCOSE BLDC GLUCOMTR-MCNC: 133 MG/DL (ref 70–99)
GLUCOSE BLDC GLUCOMTR-MCNC: 167 MG/DL (ref 70–99)
GLUCOSE BLDC GLUCOMTR-MCNC: 217 MG/DL (ref 70–99)
GLUCOSE BLDC GLUCOMTR-MCNC: 217 MG/DL (ref 70–99)
PLATELET # BLD AUTO: 319 10E3/UL (ref 150–450)

## 2025-05-02 PROCEDURE — 94799 UNLISTED PULMONARY SVC/PX: CPT

## 2025-05-02 PROCEDURE — 250N000013 HC RX MED GY IP 250 OP 250 PS 637: Performed by: INTERNAL MEDICINE

## 2025-05-02 PROCEDURE — 999N000123 HC STATISTIC OXYGEN O2DAILY TECH TIME

## 2025-05-02 PROCEDURE — 36415 COLL VENOUS BLD VENIPUNCTURE: CPT | Performed by: STUDENT IN AN ORGANIZED HEALTH CARE EDUCATION/TRAINING PROGRAM

## 2025-05-02 PROCEDURE — 120N000017 HC R&B RESPIRATORY CARE

## 2025-05-02 PROCEDURE — 97535 SELF CARE MNGMENT TRAINING: CPT | Mod: GO | Performed by: OCCUPATIONAL THERAPIST

## 2025-05-02 PROCEDURE — 99233 SBSQ HOSP IP/OBS HIGH 50: CPT | Performed by: STUDENT IN AN ORGANIZED HEALTH CARE EDUCATION/TRAINING PROGRAM

## 2025-05-02 PROCEDURE — 97110 THERAPEUTIC EXERCISES: CPT | Mod: GP

## 2025-05-02 PROCEDURE — 999N000157 HC STATISTIC RCP TIME EA 10 MIN

## 2025-05-02 PROCEDURE — 85049 AUTOMATED PLATELET COUNT: CPT | Performed by: STUDENT IN AN ORGANIZED HEALTH CARE EDUCATION/TRAINING PROGRAM

## 2025-05-02 PROCEDURE — 250N000013 HC RX MED GY IP 250 OP 250 PS 637: Performed by: STUDENT IN AN ORGANIZED HEALTH CARE EDUCATION/TRAINING PROGRAM

## 2025-05-02 PROCEDURE — 82565 ASSAY OF CREATININE: CPT | Performed by: STUDENT IN AN ORGANIZED HEALTH CARE EDUCATION/TRAINING PROGRAM

## 2025-05-02 PROCEDURE — 97530 THERAPEUTIC ACTIVITIES: CPT | Mod: GP

## 2025-05-02 RX ADMIN — INSULIN ASPART 19 UNITS: 100 INJECTION, SOLUTION INTRAVENOUS; SUBCUTANEOUS at 17:51

## 2025-05-02 RX ADMIN — UMECLIDINIUM BROMIDE AND VILANTEROL TRIFENATATE 1 PUFF: 62.5; 25 POWDER RESPIRATORY (INHALATION) at 08:18

## 2025-05-02 RX ADMIN — INSULIN ASPART 15 UNITS: 100 INJECTION, SOLUTION INTRAVENOUS; SUBCUTANEOUS at 12:57

## 2025-05-02 RX ADMIN — APIXABAN 5 MG: 5 TABLET, FILM COATED ORAL at 20:59

## 2025-05-02 RX ADMIN — Medication 1 TABLET: at 08:18

## 2025-05-02 RX ADMIN — AMLODIPINE BESYLATE 5 MG: 5 TABLET ORAL at 08:18

## 2025-05-02 RX ADMIN — INSULIN ASPART 12 UNITS: 100 INJECTION, SOLUTION INTRAVENOUS; SUBCUTANEOUS at 08:35

## 2025-05-02 RX ADMIN — INSULIN ASPART 2 UNITS: 100 INJECTION, SOLUTION INTRAVENOUS; SUBCUTANEOUS at 12:56

## 2025-05-02 RX ADMIN — APIXABAN 5 MG: 5 TABLET, FILM COATED ORAL at 08:18

## 2025-05-02 RX ADMIN — ATORVASTATIN CALCIUM 40 MG: 40 TABLET, FILM COATED ORAL at 21:00

## 2025-05-02 RX ADMIN — ANORECTAL OINTMENT: 15.7; .44; 24; 20.6 OINTMENT TOPICAL at 13:00

## 2025-05-02 RX ADMIN — ANORECTAL OINTMENT: 15.7; .44; 24; 20.6 OINTMENT TOPICAL at 21:01

## 2025-05-02 RX ADMIN — ANORECTAL OINTMENT: 15.7; .44; 24; 20.6 OINTMENT TOPICAL at 08:20

## 2025-05-02 RX ADMIN — INSULIN ASPART 1 UNITS: 100 INJECTION, SOLUTION INTRAVENOUS; SUBCUTANEOUS at 08:18

## 2025-05-02 ASSESSMENT — ACTIVITIES OF DAILY LIVING (ADL)
ADLS_ACUITY_SCORE: 58

## 2025-05-02 NOTE — PROGRESS NOTES
[facility]    Medicine Progress Note - Hospitalist Service    Date of Admission:  4/11/2025  Brief Course Prior to [facility]:  59 up F with h/o intellectual disability & memory issues, COPD, untreated ADEEL, tobacco use d/o, HTN, pAFib, & poorly controlled T2DM s/p previous hospitalization for DKA (3/18 - 3/20) who presented second time to hospital in [place] on 3/22/2025 with complaints of dyspnea, hyperglycemia, and chest pain. She was found to have an YADIRA, LLL PE, DKA, and A-fib with RVR. She was started on an insulin drip, therapeutic dose enoxaparin, Zosyn for possible pneumonia, and diltiazem drip for management of her RVR. Her DKA resolved, she was switched from diltiazem to amiodarone drip for management of A-fib with RVR; however, her degree of respiratory failure was still concerning and she was intubated 3/23 for management of worsening hypoxia.   She had a repeat CTA that demonstrated progression of her PE and RLL collapse concerning for mucous plugging and pneumonia.   She was switched from enoxaparin to heparin drip and her antibiotics were broadened to vancomycin and Zosyn. She was on norepinephrine for several days d/t hypotension attributed to sedation & sepsis-related vasoplegia. It appears that she has been doing fairly long SBTs since 3/26, which seemed to be going reasonably well per RT documentation. Primary team has been having issues w/ progressively increasing FiO2 needs. She had a sputum culture grow Candida & has been on fluconazole since 3/25. Transferred to [facility] d/t concerns that she might require a tracheostomy secondary to her inability to liberate from the vent.   At [facility] patient was admitted to the ICU on a ventilator, successfully extubated 4/1 and downgraded to floor status 4/3.  Infectious disease consulted and continued patient on Zosyn course to be completed through 4/17/2025.  General surgery consulted for right keiry/perineal abscess and performed incision and  drainage with Penrose drain placement 3/31 with subsequent repeat I&D 4/2 and 4/8.  Surgery requests wound VAC be changed by wound care Tuesdays and Thursdays.     LTACH Course:  She transferred from [facility] to [place] for wound cares, therapies, and complex medical care.   4/12-4/14: Needing BiPAP overnight.  4/15-4/20: successfully weaned off NC O2 during the day, completed zosyn 4/17 4/21-4/27- wound is closing well - [facility] plan to remove penrose drain on 4/29/2025. Consider transfer to [facility] TCU for ongoing therapy and wound care.  4/28-5/3:  New information regarding placement needs surfaced early in the week, likely needing group home. Wound vac remains--penrose drain removed--packing to tract along/under R labia. Care conference completed 4/30.    BARRIERS TO DISCHARGE (why care is unable to be provided at a lower level of care):    Large right groin wound requiring frequent cares, wound vac, IV abx, nocturnal BIPAP     Things to follow up on:  -unclear what will be best outpatient diabetes regimen as outpatient due to intellectual disability    Assessment & Plan   Acute on Chronic Hypoxic Respiratory Failure  VAP, PTA - resolved  COPD  ADEEL  Transferred from [place] for concerns of tracheostomy need, was able to liberate from ventilator withOUT tracheostomy formation. Weaned to minimal supplemental oxygen support (2-3L nasal).  - Completed zosyn 4/17  - inhalers as needed  - RT following     Perineal Abscess  Condyloma Acuminata  s/p acyclovir and I&D x3  - zosyn completed 4/17  - rectal tube discontinued  - noguera for wound protection  - VAC in place  - Continue wound care per WO nurse     PE, Acute, segmental, subsegmental  -apixaban 5mg PO BID    Seborrheic dermatitis:  Ketoconazole cream to face     T2DM  Poorly controlled as outpatient with A1c 12.7. Used to be on insulin pump. No longer on it due to intellectual delay.  - BG labile earlier during admission  - Lantus 30u subcutaneous qAM  - I:C  of 1:5 -> I:C 1:4 with meals  - ISS high     CKD3b  - Creatinine stable. Monitor     Elevated Hgb in past  Historically high  Consistent with smoking (high CO), untreated ADEEL     History of intellectual delay: supportive care.    PROBLEMS MANAGED During this or previous Encounter Now Stable but Monitoring:    Reactive thrombocytosis:  Platelets historically in normal range (reviewed last 3 years)  Peaked at 700,000, now resolved  Likely  reactive - reviewed flow chart for acute thrombocytosis, posted in note  Normal peripheral smear, liver enzymes           Diet: Snacks/Supplements Adult: Expedite Cup; With Meals  Snacks/Supplements Adult: Magic Cup; With Meals  Combination Diet Moderate Consistent Carb (60 g CHO per Meal) Diet; Easy to Chew (level 7); Thin Liquids (level 0)  Snacks/Supplements Adult: Gelatein 20 (sugar-free); With Meals    DVT Prophylaxis: DOAC  Landrum Catheter: PRESENT, indication: Wound deterioration and failed external collection device  Lines: None     Cardiac Monitoring: None  Code Status: Full Code      Clinically Significant Risk Factors               # Hypoalbuminemia: Lowest albumin = 2.9 g/dL at 4/13/2025  2:14 AM, will monitor as appropriate     # Hypertension: Noted on problem list           # DMII: A1C = 12.7 % (Ref range: <5.7 %) within past 6 months       # COPD: noted on problem list        Social Drivers of Health    Tobacco Use: Medium Risk (4/2/2025)    Patient History     Smoking Tobacco Use: Former     Smokeless Tobacco Use: Never     Passive Exposure: Current   Physical Activity: Unknown (5/16/2024)    Exercise Vital Sign     Days of Exercise per Week: 0 days   Social Connections: Unknown (5/16/2024)    Social Connection and Isolation Panel [NHANES]     Frequency of Social Gatherings with Friends and Family: More than three times a week          Disposition Plan     Medically Ready for Discharge: Anticipated in 5+ Days         Alfred Patel MD  Hospitalist  Service  LTACH  Securely message with [employer] (more info)  Text page via [employer] Paging/Directory   ______________________________________________________________________    Interval History   NO overnight events. Relatively happy, enjoying breakfast.    Physical Exam   Vital Signs: Temp: 96.9  F (36.1  C) Temp src: Axillary BP: 113/69 Pulse: 106   Resp: 20 SpO2: 92 % O2 Device: None (Room air) Oxygen Delivery: 3 LPM  Weight: 173 lbs 0 oz    General Appearance: Older F in NAD  Respiratory:  rhonchi bilaterally  Cardiovascular: RRR S1S2  GI: soft, NT  Skin: Face with areas of somewhat inflamed acne. Also wide-spread seborrheic dermatitis.  Large right groin wound - see WOC note - dressing in place  Neuro: Alert, generally nonfocal on motor and sensory testing      Medical Decision Making       50 MINUTES SPENT BY ME on the date of service doing chart review, history, exam, documentation & further activities per the note.      Data     I have personally reviewed the following data over the past 24 hrs:    N/A  \   N/A   / 319     N/A N/A N/A /  167 (H)   N/A N/A 1.29 (H) \       Imaging results reviewed over the past 24 hrs:   No results found for this or any previous visit (from the past 24 hours).

## 2025-05-02 NOTE — PROGRESS NOTES
04/30/25 6191   Appointment Info   Signing Clinician's Name / Credentials (OT) Jose Antonio Martines OTR/L   Therapeutic Procedures/Exercise   Therapeutic Procedure: strength, endurance, ROM, flexibillity minutes (06330) 38   Symptoms Noted During/After Treatment fatigue   Treatment Detail/Skilled Intervention Facilitated fctn mobility and UE exerc to further safe IND. Pt ambulted to dept w/FWW and SBA. TH managed lines/wound vac. Able to complete full distance, needing rest break once in dept. Engaged in UE activity- rainbow arc w/ 1# wrist wt. Completed in 4 directions w/ short break between to manage muscle fatigue. Completed wt therapy ball (2.2#) floor to chest press 3 bouts, 10 reps. TH provided cues for UE extension throuhgout activity. Able to adjust and demo extension 25% of trials.   OT Discharge Planning   OT Plan 4/30: dressing including clothing retrieval, standing at sink for g/h, toileting in BR/toilet transfers, UB ex, bed mobility, bring reacher and sock aid to room for LB dressing.   OT Discharge Recommendation (DC Rec) Transitional Care Facility   OT Rationale for DC Rec pt may require further therapy prior to dc to home.   OT Brief overview of current status Pt presenting w/ bright affect, cooperative and partic 100%. LImited by decresaed activity tolerance and general decreased strength. Does need cues and demo to compelte exerc accurately for best benefit. Cont OT/POC   Total Session Time   Timed Code Treatment Minutes 38   Total Session Time (sum of timed and untimed services) 38

## 2025-05-02 NOTE — PROGRESS NOTES
Pt was on RA desat to 87% at night and 2  lpm nc has been added, jose well. Bipap is on hold. SAT 87-94%, HR 79-88, RR 18-20 . BS clear/diminish . ABG on 5/5    ABG4/28@5:21am 7.44/43/72/28/96%  ABG4/30@06:13am 7.43/44/68/29/95%

## 2025-05-02 NOTE — PLAN OF CARE
Problem: Pain Acute  Goal: Optimal Pain Control and Function  Outcome: Progressing  Intervention: Optimize Psychosocial Wellbeing  Recent Flowsheet Documentation  Taken 5/2/2025 0900 by Zoë Crandall RN  Diversional Activities: television  Intervention: Prevent or Manage Pain  Recent Flowsheet Documentation  Taken 5/2/2025 0900 by Zoë Crandall RN  Medication Review/Management: medications reviewed     Problem: Wound  Goal: Optimal Coping  Outcome: Progressing  Intervention: Support Patient and Family Response  Recent Flowsheet Documentation  Taken 5/2/2025 0900 by Zoë Crandall RN  Family/Support System Care: support provided  Goal: Absence of Infection Signs and Symptoms  Intervention: Prevent or Manage Infection  Recent Flowsheet Documentation  Taken 5/2/2025 0900 by Zoë Crandall RN  Infection Management: aseptic technique maintained     Problem: Infection  Goal: Absence of Infection Signs and Symptoms  Outcome: Progressing  Intervention: Prevent or Manage Infection  Recent Flowsheet Documentation  Taken 5/2/2025 0900 by Zoë Crandall RN  Infection Management: aseptic technique maintained   Goal Outcome Evaluation:       Pt alert, oriented x4, wound vac in place at -125mmhg suction. Cannister was at 100ml level. Wound vac dressing changed, pt refused pain meds to be given. New vac cannister applied. Pt sat up in the recliner for breakfast and went back to bed for lunch. Ate 100% of meals.   Vital signs:  Temp: 98.5  F (36.9  C) Temp src: Oral BP: 107/62 Pulse: 90   Resp: 24 SpO2: 92 % Oxygen Delivery: 3 LPM

## 2025-05-03 ENCOUNTER — APPOINTMENT (OUTPATIENT)
Dept: PHYSICAL THERAPY | Facility: CLINIC | Age: 60
DRG: 602 | End: 2025-05-03
Attending: INTERNAL MEDICINE
Payer: COMMERCIAL

## 2025-05-03 LAB
GLUCOSE BLDC GLUCOMTR-MCNC: 106 MG/DL (ref 70–99)
GLUCOSE BLDC GLUCOMTR-MCNC: 161 MG/DL (ref 70–99)
GLUCOSE BLDC GLUCOMTR-MCNC: 166 MG/DL (ref 70–99)
GLUCOSE BLDC GLUCOMTR-MCNC: 168 MG/DL (ref 70–99)

## 2025-05-03 PROCEDURE — 97116 GAIT TRAINING THERAPY: CPT | Mod: GP

## 2025-05-03 PROCEDURE — 999N000123 HC STATISTIC OXYGEN O2DAILY TECH TIME

## 2025-05-03 PROCEDURE — 94799 UNLISTED PULMONARY SVC/PX: CPT

## 2025-05-03 PROCEDURE — 999N000157 HC STATISTIC RCP TIME EA 10 MIN

## 2025-05-03 PROCEDURE — 250N000013 HC RX MED GY IP 250 OP 250 PS 637: Performed by: STUDENT IN AN ORGANIZED HEALTH CARE EDUCATION/TRAINING PROGRAM

## 2025-05-03 PROCEDURE — 250N000013 HC RX MED GY IP 250 OP 250 PS 637: Performed by: INTERNAL MEDICINE

## 2025-05-03 PROCEDURE — 97530 THERAPEUTIC ACTIVITIES: CPT | Mod: GP

## 2025-05-03 PROCEDURE — 120N000017 HC R&B RESPIRATORY CARE

## 2025-05-03 PROCEDURE — 99233 SBSQ HOSP IP/OBS HIGH 50: CPT | Performed by: STUDENT IN AN ORGANIZED HEALTH CARE EDUCATION/TRAINING PROGRAM

## 2025-05-03 RX ADMIN — ANORECTAL OINTMENT: 15.7; .44; 24; 20.6 OINTMENT TOPICAL at 13:05

## 2025-05-03 RX ADMIN — ANORECTAL OINTMENT: 15.7; .44; 24; 20.6 OINTMENT TOPICAL at 09:02

## 2025-05-03 RX ADMIN — INSULIN ASPART 16 UNITS: 100 INJECTION, SOLUTION INTRAVENOUS; SUBCUTANEOUS at 18:14

## 2025-05-03 RX ADMIN — INSULIN ASPART 15 UNITS: 100 INJECTION, SOLUTION INTRAVENOUS; SUBCUTANEOUS at 08:58

## 2025-05-03 RX ADMIN — APIXABAN 5 MG: 5 TABLET, FILM COATED ORAL at 09:02

## 2025-05-03 RX ADMIN — Medication 1 TABLET: at 09:02

## 2025-05-03 RX ADMIN — APIXABAN 5 MG: 5 TABLET, FILM COATED ORAL at 21:37

## 2025-05-03 RX ADMIN — INSULIN ASPART 27 UNITS: 100 INJECTION, SOLUTION INTRAVENOUS; SUBCUTANEOUS at 12:32

## 2025-05-03 RX ADMIN — UMECLIDINIUM BROMIDE AND VILANTEROL TRIFENATATE 1 PUFF: 62.5; 25 POWDER RESPIRATORY (INHALATION) at 09:03

## 2025-05-03 RX ADMIN — INSULIN ASPART 1 UNITS: 100 INJECTION, SOLUTION INTRAVENOUS; SUBCUTANEOUS at 12:32

## 2025-05-03 RX ADMIN — INSULIN ASPART 1 UNITS: 100 INJECTION, SOLUTION INTRAVENOUS; SUBCUTANEOUS at 08:58

## 2025-05-03 RX ADMIN — AMLODIPINE BESYLATE 5 MG: 5 TABLET ORAL at 09:02

## 2025-05-03 RX ADMIN — ATORVASTATIN CALCIUM 40 MG: 40 TABLET, FILM COATED ORAL at 21:37

## 2025-05-03 RX ADMIN — ANORECTAL OINTMENT: 15.7; .44; 24; 20.6 OINTMENT TOPICAL at 21:41

## 2025-05-03 ASSESSMENT — ACTIVITIES OF DAILY LIVING (ADL)
ADLS_ACUITY_SCORE: 57
ADLS_ACUITY_SCORE: 57
ADLS_ACUITY_SCORE: 62
ADLS_ACUITY_SCORE: 58
ADLS_ACUITY_SCORE: 57
ADLS_ACUITY_SCORE: 62
ADLS_ACUITY_SCORE: 58
ADLS_ACUITY_SCORE: 58
ADLS_ACUITY_SCORE: 57
ADLS_ACUITY_SCORE: 57
ADLS_ACUITY_SCORE: 58
ADLS_ACUITY_SCORE: 57
ADLS_ACUITY_SCORE: 58
ADLS_ACUITY_SCORE: 57
ADLS_ACUITY_SCORE: 57
ADLS_ACUITY_SCORE: 58
ADLS_ACUITY_SCORE: 57
ADLS_ACUITY_SCORE: 58
ADLS_ACUITY_SCORE: 57

## 2025-05-03 ASSESSMENT — VISUAL ACUITY: OU: NOT TESTED

## 2025-05-03 NOTE — PROGRESS NOTES
Pt is on 3 lpm oxymask, jose well. Bipap is on hold. SAT 93-95%, HR 87-92, RR 22-24. BS clear/diminish.  Keep sat >/=90%. ABG on 5/5    ABG4/28@5:21am 7.44/43/72/28/96%  ABG4/30@06:13am 7.43/44/68/29/95%

## 2025-05-03 NOTE — PROGRESS NOTES
[facility]    Medicine Progress Note - Hospitalist Service    Date of Admission:  4/11/2025  Brief Course Prior to [facility]:  59 up F with h/o intellectual disability & memory issues, COPD, untreated ADEEL, tobacco use d/o, HTN, pAFib, & poorly controlled T2DM s/p previous hospitalization for DKA (3/18 - 3/20) who presented second time to hospital in [place] on 3/22/2025 with complaints of dyspnea, hyperglycemia, and chest pain. She was found to have an YADIRA, LLL PE, DKA, and A-fib with RVR. She was started on an insulin drip, therapeutic dose enoxaparin, Zosyn for possible pneumonia, and diltiazem drip for management of her RVR. Her DKA resolved, she was switched from diltiazem to amiodarone drip for management of A-fib with RVR; however, her degree of respiratory failure was still concerning and she was intubated 3/23 for management of worsening hypoxia.   She had a repeat CTA that demonstrated progression of her PE and RLL collapse concerning for mucous plugging and pneumonia.   She was switched from enoxaparin to heparin drip and her antibiotics were broadened to vancomycin and Zosyn. She was on norepinephrine for several days d/t hypotension attributed to sedation & sepsis-related vasoplegia. It appears that she has been doing fairly long SBTs since 3/26, which seemed to be going reasonably well per RT documentation. Primary team has been having issues w/ progressively increasing FiO2 needs. She had a sputum culture grow Candida & has been on fluconazole since 3/25. Transferred to [facility] d/t concerns that she might require a tracheostomy secondary to her inability to liberate from the vent.   At [facility] patient was admitted to the ICU on a ventilator, successfully extubated 4/1 and downgraded to floor status 4/3.  Infectious disease consulted and continued patient on Zosyn course to be completed through 4/17/2025.  General surgery consulted for right keiry/perineal abscess and performed incision and  drainage with Penrose drain placement 3/31 with subsequent repeat I&D 4/2 and 4/8.  Surgery requests wound VAC be changed by wound care Tuesdays and Thursdays.     LTACH Course:  She transferred from [facility] to [place] for wound cares, therapies, and complex medical care.   4/12-4/14: Needing BiPAP overnight.  4/15-4/20: successfully weaned off NC O2 during the day, completed zosyn 4/17 4/21-4/27- wound is closing well - [facility] plan to remove penrose drain on 4/29/2025. Consider transfer to [facility] TCU for ongoing therapy and wound care.  4/28-5/3:  New information regarding placement needs surfaced early in the week, likely needing group home. Wound vac remains--penrose drain removed--packing to tract along/under R labia. Care conference completed 4/30.    BARRIERS TO DISCHARGE (why care is unable to be provided at a lower level of care):    Large right groin wound requiring frequent cares, wound vac, nocturnal BIPAP     Things to follow up on:  -unclear what will be best outpatient diabetes regimen as outpatient due to intellectual disability    Assessment & Plan   Acute on Chronic Hypoxic Respiratory Failure  VAP, PTA - resolved  COPD  ADEEL  Transferred from [place] for concerns of tracheostomy need, was able to liberate from ventilator withOUT tracheostomy formation. Weaned to minimal supplemental oxygen support (2-3L nasal).  - Completed zosyn 4/17  - inhalers as needed  - RT following     Perineal Abscess  Condyloma Acuminata  s/p acyclovir and I&D x3  - zosyn completed 4/17  - rectal tube discontinued  - noguera for wound protection  - VAC in place  - Continue wound care per [facility] nurse     PE, Acute, segmental, subsegmental  -apixaban 5mg PO BID    Seborrheic dermatitis:  Ketoconazole cream to face     T2DM  Poorly controlled as outpatient with A1c 12.7. Used to be on insulin pump. No longer on it due to intellectual delay.  - BG labile earlier during admission  - Lantus 30u subcutaneous qAM  - I:C of 1:5  -> I:C 1:4 with meals  - ISS high     CKD3b  - Creatinine stable. Monitor     Elevated Hgb in past  Historically high  Consistent with smoking (high CO), untreated ADEEL     History of intellectual delay: supportive care.    PROBLEMS MANAGED During this or previous Encounter Now Stable but Monitoring:    Reactive thrombocytosis:  Platelets historically in normal range (reviewed last 3 years)  Peaked at 700,000, now resolved  Likely  reactive - reviewed flow chart for acute thrombocytosis, posted in note  Normal peripheral smear, liver enzymes           Diet: Snacks/Supplements Adult: Expedite Cup; With Meals  Snacks/Supplements Adult: Magic Cup; With Meals  Combination Diet Moderate Consistent Carb (60 g CHO per Meal) Diet; Easy to Chew (level 7); Thin Liquids (level 0)  Snacks/Supplements Adult: Gelatein 20 (sugar-free); With Meals    DVT Prophylaxis: DOAC  Landrum Catheter: PRESENT, indication: Wound deterioration and failed external collection device  Lines: None     Cardiac Monitoring: None  Code Status: Full Code      Clinically Significant Risk Factors               # Hypoalbuminemia: Lowest albumin = 2.9 g/dL at 4/13/2025  2:14 AM, will monitor as appropriate     # Hypertension: Noted on problem list           # DMII: A1C = 12.7 % (Ref range: <5.7 %) within past 6 months       # COPD: noted on problem list        Social Drivers of Health    Tobacco Use: Medium Risk (4/2/2025)    Patient History     Smoking Tobacco Use: Former     Smokeless Tobacco Use: Never     Passive Exposure: Current   Physical Activity: Unknown (5/16/2024)    Exercise Vital Sign     Days of Exercise per Week: 0 days   Social Connections: Unknown (5/16/2024)    Social Connection and Isolation Panel [NHANES]     Frequency of Social Gatherings with Friends and Family: More than three times a week          Disposition Plan     Medically Ready for Discharge: Anticipated in 5+ Days         Alfred Patel MD  Hospitalist  Service  LTACH  Securely message with [employer] (more info)  Text page via [employer] Paging/Directory   ______________________________________________________________________    Interval History   NO overnight events. No concerns/complaints. Eating well.    Physical Exam   Vital Signs: Temp: 98.2  F (36.8  C) Temp src: Axillary BP: 120/65 Pulse: 88   Resp: 20 SpO2: 95 % O2 Device: Oxymask Oxygen Delivery: 3 LPM  Weight: 182 lbs 12.8 oz    General Appearance: Older F in NAD  Respiratory:  rhonchi bilaterally  Cardiovascular: RRR S1S2  GI: soft, NT  Skin: Face with areas of somewhat inflamed acne. Also wide-spread seborrheic dermatitis.  Large right groin wound - see WOC note - dressing in place  Neuro: Alert, generally nonfocal on motor and sensory testing      Medical Decision Making       55 MINUTES SPENT BY ME on the date of service doing chart review, history, exam, documentation & further activities per the note.      Data         Imaging results reviewed over the past 24 hrs:   No results found for this or any previous visit (from the past 24 hours).

## 2025-05-03 NOTE — PLAN OF CARE
Problem: Pain Acute  Goal: Optimal Pain Control and Function  5/3/2025 0119 by Phil Reyes RN  Outcome: Progressing  5/3/2025 0119 by Phil Reyes RN  Outcome: Progressing  Intervention: Optimize Psychosocial Wellbeing  Recent Flowsheet Documentation  Taken 5/3/2025 0010 by Phil Reyes RN  Supportive Measures:   active listening utilized   decision-making supported  Diversional Activities: television  Intervention: Prevent or Manage Pain  Recent Flowsheet Documentation  Taken 5/3/2025 0010 by Phil Reyes RN  Sensory Stimulation Regulation: television on  Sleep/Rest Enhancement: awakenings minimized  Bowel Elimination Promotion: adequate fluid intake promoted  Medication Review/Management: medications reviewed   Goal Outcome Evaluation:     Pt alert and oriented x 4. Denied pain or discomfort.No PRN medication given.  VSS.  On O2 via  mask. Ambulates assist of  one with walker and  gait belt. On regular diet, adequate intake and output. Last BM: 4/27/25.  Incontinent of bowel  and had a noguera.  Skin looks multiple open areas covered by wound vac dressing dressing was done earlier today by day nurse. Uses call light appropriately. Mood pleasant and cooperative for cares an treatment. Handling hospitalization well.  Continue to monitor.            Phil Reyes RN

## 2025-05-03 NOTE — PLAN OF CARE
Problem: Wound  Goal: Optimal Coping  Outcome: Progressing  Intervention: Support Patient and Family Response  Recent Flowsheet Documentation  Taken 5/3/2025 0845 by Jazmin So RN  Supportive Measures:   active listening utilized   decision-making supported  Family/Support System Care: (not at bed side at this moment.) other (see comments)     Problem: Pain Acute  Goal: Optimal Pain Control and Function  Outcome: Progressing  Intervention: Optimize Psychosocial Wellbeing  Recent Flowsheet Documentation  Taken 5/3/2025 0845 by Jazmin So RN  Supportive Measures:   active listening utilized   decision-making supported  Diversional Activities: television  Intervention: Prevent or Manage Pain  Recent Flowsheet Documentation  Taken 5/3/2025 0845 by Jazmin So RN  Sensory Stimulation Regulation: television on  Bowel Elimination Promotion: adequate fluid intake promoted  Medication Review/Management: medications reviewed   Goal Outcome Evaluation:         Patient presented as calm, pleasant and cooperative this shift. Patient is alert and oriented x 4, able to communicate needs. Temp: 98.3  F (36.8  C) Temp src: Oral BP: 113/68 Pulse: 95   Resp: 20 SpO2: 93 % O2 Device: None (Room air) Oxygen Delivery: 3 LPM Wound care to right labia done. Patient denied having any pain or discomfort.    Jazmin So RN

## 2025-05-04 ENCOUNTER — APPOINTMENT (OUTPATIENT)
Dept: OCCUPATIONAL THERAPY | Facility: CLINIC | Age: 60
DRG: 602 | End: 2025-05-04
Attending: INTERNAL MEDICINE
Payer: COMMERCIAL

## 2025-05-04 LAB
GLUCOSE BLDC GLUCOMTR-MCNC: 171 MG/DL (ref 70–99)
GLUCOSE BLDC GLUCOMTR-MCNC: 176 MG/DL (ref 70–99)
GLUCOSE BLDC GLUCOMTR-MCNC: 205 MG/DL (ref 70–99)
GLUCOSE BLDC GLUCOMTR-MCNC: 232 MG/DL (ref 70–99)

## 2025-05-04 PROCEDURE — 250N000013 HC RX MED GY IP 250 OP 250 PS 637: Performed by: STUDENT IN AN ORGANIZED HEALTH CARE EDUCATION/TRAINING PROGRAM

## 2025-05-04 PROCEDURE — 120N000017 HC R&B RESPIRATORY CARE

## 2025-05-04 PROCEDURE — 250N000013 HC RX MED GY IP 250 OP 250 PS 637: Performed by: INTERNAL MEDICINE

## 2025-05-04 PROCEDURE — 99233 SBSQ HOSP IP/OBS HIGH 50: CPT | Performed by: STUDENT IN AN ORGANIZED HEALTH CARE EDUCATION/TRAINING PROGRAM

## 2025-05-04 PROCEDURE — 999N000157 HC STATISTIC RCP TIME EA 10 MIN

## 2025-05-04 PROCEDURE — 97535 SELF CARE MNGMENT TRAINING: CPT | Mod: GO

## 2025-05-04 PROCEDURE — 250N000012 HC RX MED GY IP 250 OP 636 PS 637: Performed by: STUDENT IN AN ORGANIZED HEALTH CARE EDUCATION/TRAINING PROGRAM

## 2025-05-04 PROCEDURE — 97530 THERAPEUTIC ACTIVITIES: CPT | Mod: GO

## 2025-05-04 RX ADMIN — INSULIN ASPART 1 UNITS: 100 INJECTION, SOLUTION INTRAVENOUS; SUBCUTANEOUS at 08:49

## 2025-05-04 RX ADMIN — ATORVASTATIN CALCIUM 40 MG: 40 TABLET, FILM COATED ORAL at 20:43

## 2025-05-04 RX ADMIN — INSULIN ASPART 2 UNITS: 100 INJECTION, SOLUTION INTRAVENOUS; SUBCUTANEOUS at 18:18

## 2025-05-04 RX ADMIN — Medication 1 TABLET: at 08:49

## 2025-05-04 RX ADMIN — APIXABAN 5 MG: 5 TABLET, FILM COATED ORAL at 20:43

## 2025-05-04 RX ADMIN — INSULIN ASPART 12 UNITS: 100 INJECTION, SOLUTION INTRAVENOUS; SUBCUTANEOUS at 18:18

## 2025-05-04 RX ADMIN — ANORECTAL OINTMENT: 15.7; .44; 24; 20.6 OINTMENT TOPICAL at 08:53

## 2025-05-04 RX ADMIN — INSULIN ASPART 17 UNITS: 100 INJECTION, SOLUTION INTRAVENOUS; SUBCUTANEOUS at 08:50

## 2025-05-04 RX ADMIN — APIXABAN 5 MG: 5 TABLET, FILM COATED ORAL at 08:49

## 2025-05-04 RX ADMIN — INSULIN ASPART 2 UNITS: 100 INJECTION, SOLUTION INTRAVENOUS; SUBCUTANEOUS at 12:53

## 2025-05-04 RX ADMIN — INSULIN ASPART 26 UNITS: 100 INJECTION, SOLUTION INTRAVENOUS; SUBCUTANEOUS at 12:52

## 2025-05-04 RX ADMIN — ANORECTAL OINTMENT: 15.7; .44; 24; 20.6 OINTMENT TOPICAL at 20:49

## 2025-05-04 RX ADMIN — AMLODIPINE BESYLATE 5 MG: 5 TABLET ORAL at 08:49

## 2025-05-04 RX ADMIN — UMECLIDINIUM BROMIDE AND VILANTEROL TRIFENATATE 1 PUFF: 62.5; 25 POWDER RESPIRATORY (INHALATION) at 08:49

## 2025-05-04 ASSESSMENT — ACTIVITIES OF DAILY LIVING (ADL)
ADLS_ACUITY_SCORE: 58

## 2025-05-04 NOTE — PLAN OF CARE
Goal Outcome Evaluation:    Pt VSS. Alert and oriented x 4.  Complains of pain 0/10.  Denies nausea, dizziness, shortness of breath and lightheadedness.  On RA. Assist of 1 with walker and GB to the bathroom.  Adequate intake and output. On Comb. Diet and thin liquid. Last BM: 5/3/25. Continent of bowel and has a noguera cath.  No PRN was given. Uses call light appropriately.   Continue to monitor.     Alida Kraus RN  [facility]

## 2025-05-04 NOTE — PLAN OF CARE
Problem: Pain Acute  Goal: Optimal Pain Control and Function  Intervention: Optimize Psychosocial Wellbeing  Recent Flowsheet Documentation  Taken 5/4/2025 1000 by Jazmin So RN  Supportive Measures:   active listening utilized   decision-making supported  Diversional Activities: television  Intervention: Prevent or Manage Pain  Recent Flowsheet Documentation  Taken 5/4/2025 1000 by Jazmin So RN  Sensory Stimulation Regulation: television on  Bowel Elimination Promotion: adequate fluid intake promoted  Medication Review/Management: medications reviewed     Problem: Wound  Goal: Skin Health and Integrity  Intervention: Optimize Skin Protection  Recent Flowsheet Documentation  Taken 5/4/2025 1000 by Jazmin So RN  Pressure Reduction Devices: heel offloading device utilized  Activity Management:   activity encouraged   ambulated to bathroom   Goal Outcome Evaluation:         Vital signs stable, Temp: 97.4  F (36.3  C) Temp src: Oral BP: 123/75 Pulse: 88   Resp: 16 SpO2: (!) 91 % O2 Device: None (Room air) Oxygen Delivery: 3 LPM. Right labia wound dressing change done, wound VAC intact. Landrum is patent, draining clear yellow urine. Patient's friend visited this evening and took patient outside. Good food and fluid intake.Patient denied having any pain or discomfort.

## 2025-05-04 NOTE — PROGRESS NOTES
Patient currently on 3L NC at night with oximetry.    RR 20, HR 89, SpO2 94%    Flutter valve BID    BS clear, diminished

## 2025-05-04 NOTE — PROGRESS NOTES
[facility]    Medicine Progress Note - Hospitalist Service    Date of Admission:  4/11/2025  Brief Course Prior to [facility]:  59 up F with h/o intellectual disability & memory issues, COPD, untreated ADEEL, tobacco use d/o, HTN, pAFib, & poorly controlled T2DM s/p previous hospitalization for DKA (3/18 - 3/20) who presented second time to hospital in [place] on 3/22/2025 with complaints of dyspnea, hyperglycemia, and chest pain. She was found to have an YADIRA, LLL PE, DKA, and A-fib with RVR. She was started on an insulin drip, therapeutic dose enoxaparin, Zosyn for possible pneumonia, and diltiazem drip for management of her RVR. Her DKA resolved, she was switched from diltiazem to amiodarone drip for management of A-fib with RVR; however, her degree of respiratory failure was still concerning and she was intubated 3/23 for management of worsening hypoxia.   She had a repeat CTA that demonstrated progression of her PE and RLL collapse concerning for mucous plugging and pneumonia.   She was switched from enoxaparin to heparin drip and her antibiotics were broadened to vancomycin and Zosyn. She was on norepinephrine for several days d/t hypotension attributed to sedation & sepsis-related vasoplegia. It appears that she has been doing fairly long SBTs since 3/26, which seemed to be going reasonably well per RT documentation. Primary team has been having issues w/ progressively increasing FiO2 needs. She had a sputum culture grow Candida & has been on fluconazole since 3/25. Transferred to [facility] d/t concerns that she might require a tracheostomy secondary to her inability to liberate from the vent.   At [facility] patient was admitted to the ICU on a ventilator, successfully extubated 4/1 and downgraded to floor status 4/3.  Infectious disease consulted and continued patient on Zosyn course to be completed through 4/17/2025.  General surgery consulted for right keiry/perineal abscess and performed incision and  drainage with Penrose drain placement 3/31 with subsequent repeat I&D 4/2 and 4/8.  Surgery requests wound VAC be changed by wound care Tuesdays and Thursdays.     LTACH Course:  She transferred from [facility] to [place] for wound cares, therapies, and complex medical care.   4/12-4/14: Needing BiPAP overnight.  4/15-4/20: successfully weaned off NC O2 during the day, completed zosyn 4/17 4/21-4/27- wound is closing well - [facility] plan to remove penrose drain on 4/29/2025. Consider transfer to [facility] TCU for ongoing therapy and wound care.  4/28-5/3:  New information regarding placement needs surfaced early in the week, likely needing group home. Wound vac remains--penrose drain removed--packing to tract along/under R labia. Care conference completed 4/30.    BARRIERS TO DISCHARGE (why care is unable to be provided at a lower level of care):    Large right groin wound requiring frequent cares, wound vac, nocturnal BIPAP     Things to follow up on:  -unclear what will be best outpatient diabetes regimen as outpatient due to intellectual disability    Assessment & Plan   Acute on Chronic Hypoxic Respiratory Failure  VAP, PTA - resolved  COPD  ADEEL  Transferred from [place] for concerns of tracheostomy need, was able to liberate from ventilator withOUT tracheostomy formation. Weaned to minimal supplemental oxygen support (2-3L nasal).  - Completed zosyn 4/17  - inhalers as needed  - RT following     Perineal Abscess  Condyloma Acuminata  s/p acyclovir and I&D x3  - zosyn completed 4/17  - rectal tube discontinued  - noguera for wound protection  - VAC in place  - Continue wound care per [facility] nurse     PE, Acute, segmental, subsegmental  -apixaban 5mg PO BID    Seborrheic dermatitis:  Ketoconazole cream to face     T2DM  Poorly controlled as outpatient with A1c 12.7. Used to be on insulin pump. No longer on it due to intellectual delay.  - BG labile earlier during admission  - Lantus 30u subcutaneous qAM  - I:C of 1:5  -> I:C 1:4 with meals  - ISS high     CKD3b  - Creatinine stable. Monitor     Elevated Hgb in past  Historically high  Consistent with smoking (high CO), untreated ADEEL     History of intellectual delay: supportive care.    PROBLEMS MANAGED During this or previous Encounter Now Stable but Monitoring:    Reactive thrombocytosis:  Platelets historically in normal range (reviewed last 3 years)  Peaked at 700,000, now resolved  Likely  reactive - reviewed flow chart for acute thrombocytosis, posted in note  Normal peripheral smear, liver enzymes           Diet: Snacks/Supplements Adult: Expedite Cup; With Meals  Snacks/Supplements Adult: Magic Cup; With Meals  Combination Diet Moderate Consistent Carb (60 g CHO per Meal) Diet; Easy to Chew (level 7); Thin Liquids (level 0)  Snacks/Supplements Adult: Gelatein 20 (sugar-free); With Meals    DVT Prophylaxis: DOAC  Landrum Catheter: PRESENT, indication: Acute retention or obstruction  Lines: None     Cardiac Monitoring: None  Code Status: Full Code      Clinically Significant Risk Factors               # Hypoalbuminemia: Lowest albumin = 2.9 g/dL at 4/13/2025  2:14 AM, will monitor as appropriate     # Hypertension: Noted on problem list           # DMII: A1C = 12.7 % (Ref range: <5.7 %) within past 6 months       # COPD: noted on problem list        Social Drivers of Health    Tobacco Use: Medium Risk (4/2/2025)    Patient History     Smoking Tobacco Use: Former     Smokeless Tobacco Use: Never     Passive Exposure: Current   Physical Activity: Unknown (5/16/2024)    Exercise Vital Sign     Days of Exercise per Week: 0 days   Social Connections: Unknown (5/16/2024)    Social Connection and Isolation Panel [NHANES]     Frequency of Social Gatherings with Friends and Family: More than three times a week          Disposition Plan     Medically Ready for Discharge: Anticipated in 5+ Days         Alfred Patel MD  Hospitalist Service  LTACH  Securely message with [employer]  (more info)  Text page via [employer] Paging/Directory   ______________________________________________________________________    Interval History   NO overnight events. Went outside yesterday, happy. Ambulates with walker assist.    Physical Exam   Vital Signs: Temp: 99.3  F (37.4  C) Temp src: Axillary BP: 127/70 Pulse: 89   Resp: 20 SpO2: 97 % O2 Device: Oxymask Oxygen Delivery: 3 LPM  Weight: 183 lbs 0 oz    General Appearance: Older F in NAD  Respiratory:  rhonchi bilaterally  Cardiovascular: RRR S1S2  GI: soft, NT  Skin: Face with areas of somewhat inflamed acne. Also wide-spread seborrheic dermatitis.  Large right groin wound - see WOC note - dressing in place  Neuro: Alert, generally nonfocal on motor and sensory testing      Medical Decision Making       50 MINUTES SPENT BY ME on the date of service doing chart review, history, exam, documentation & further activities per the note.      Data         Imaging results reviewed over the past 24 hrs:   No results found for this or any previous visit (from the past 24 hours).

## 2025-05-04 NOTE — PLAN OF CARE
Goal Outcome Evaluation:      Plan of Care Reviewed With: patient    Overall Patient Progress: improvingOverall Patient Progress: improving    /70 (BP Location: Left arm)   Pulse 89   Temp 99.3  F (37.4  C) (Axillary)   Resp 20   Wt 82.9 kg (182 lb 12.8 oz)   SpO2 97%   BMI 30.42 kg/m      Patient is Alert and Oriented x4 but forgetful. They are 1 Assist with Gait Belt and Walker.  Pt is a Diabetic and combination and carb count diet.  They are denying pain.  Patient has no IV access. Pt has wound vac for Right groin wound at 125 with 100 ml output. Pt has Landrum cath with 525 ml output for the shift. Pt has BiPaP at night, slept through the night well, no PRN meds given this shift.

## 2025-05-05 ENCOUNTER — APPOINTMENT (OUTPATIENT)
Dept: OCCUPATIONAL THERAPY | Facility: CLINIC | Age: 60
DRG: 602 | End: 2025-05-05
Attending: INTERNAL MEDICINE
Payer: COMMERCIAL

## 2025-05-05 ENCOUNTER — APPOINTMENT (OUTPATIENT)
Dept: PHYSICAL THERAPY | Facility: CLINIC | Age: 60
DRG: 602 | End: 2025-05-05
Attending: INTERNAL MEDICINE
Payer: COMMERCIAL

## 2025-05-05 LAB
ALBUMIN SERPL BCG-MCNC: 3.4 G/DL (ref 3.5–5.2)
ALP SERPL-CCNC: 87 U/L (ref 40–150)
ALT SERPL W P-5'-P-CCNC: 13 U/L (ref 0–50)
ANION GAP SERPL CALCULATED.3IONS-SCNC: 9 MMOL/L (ref 7–15)
AST SERPL W P-5'-P-CCNC: 15 U/L (ref 0–45)
BASE EXCESS BLDA CALC-SCNC: 4.8 MMOL/L (ref -3–3)
BILIRUB SERPL-MCNC: 0.4 MG/DL
BUN SERPL-MCNC: 49.6 MG/DL (ref 8–23)
CA-I BLD-MCNC: 5.1 MG/DL (ref 4.4–5.2)
CALCIUM SERPL-MCNC: 9.7 MG/DL (ref 8.8–10.4)
CHLORIDE SERPL-SCNC: 103 MMOL/L (ref 98–107)
CREAT SERPL-MCNC: 1.21 MG/DL (ref 0.51–0.95)
EGFRCR SERPLBLD CKD-EPI 2021: 51 ML/MIN/1.73M2
ERYTHROCYTE [DISTWIDTH] IN BLOOD BY AUTOMATED COUNT: 14.3 % (ref 10–15)
GLUCOSE BLD-MCNC: 132 MG/DL (ref 70–99)
GLUCOSE BLDC GLUCOMTR-MCNC: 147 MG/DL (ref 70–99)
GLUCOSE BLDC GLUCOMTR-MCNC: 179 MG/DL (ref 70–99)
GLUCOSE BLDC GLUCOMTR-MCNC: 222 MG/DL (ref 70–99)
GLUCOSE BLDC GLUCOMTR-MCNC: 299 MG/DL (ref 70–99)
GLUCOSE SERPL-MCNC: 132 MG/DL (ref 70–99)
HCO3 BLDA-SCNC: 29 MMOL/L (ref 21–28)
HCO3 SERPL-SCNC: 30 MMOL/L (ref 22–29)
HCT VFR BLD AUTO: 36.4 % (ref 35–47)
HGB BLD-MCNC: 11.1 G/DL (ref 11.7–15.7)
HGB BLD-MCNC: 11.8 G/DL (ref 11.7–15.7)
LACTATE BLD-SCNC: 1.1 MMOL/L (ref 0.7–2)
MCH RBC QN AUTO: 31 PG (ref 26.5–33)
MCHC RBC AUTO-ENTMCNC: 32.4 G/DL (ref 31.5–36.5)
MCV RBC AUTO: 96 FL (ref 78–100)
OXYHGB MFR BLDA: 96 % (ref 92–100)
PCO2 BLDA: 42 MM HG (ref 35–45)
PH BLDA: 7.45 [PH] (ref 7.35–7.45)
PLATELET # BLD AUTO: 304 10E3/UL (ref 150–450)
PO2 BLDA: 81 MM HG (ref 80–105)
POTASSIUM BLD-SCNC: 4 MMOL/L (ref 3.4–5.3)
POTASSIUM SERPL-SCNC: 4.3 MMOL/L (ref 3.4–5.3)
PROT SERPL-MCNC: 6.9 G/DL (ref 6.4–8.3)
RBC # BLD AUTO: 3.81 10E6/UL (ref 3.8–5.2)
SAO2 % BLDA: 98 % (ref 96–97)
SODIUM BLD-SCNC: 140 MMOL/L (ref 135–145)
SODIUM SERPL-SCNC: 142 MMOL/L (ref 135–145)
WBC # BLD AUTO: 8.2 10E3/UL (ref 4–11)

## 2025-05-05 PROCEDURE — 250N000013 HC RX MED GY IP 250 OP 250 PS 637: Performed by: STUDENT IN AN ORGANIZED HEALTH CARE EDUCATION/TRAINING PROGRAM

## 2025-05-05 PROCEDURE — 999N000123 HC STATISTIC OXYGEN O2DAILY TECH TIME

## 2025-05-05 PROCEDURE — 84132 ASSAY OF SERUM POTASSIUM: CPT

## 2025-05-05 PROCEDURE — 97530 THERAPEUTIC ACTIVITIES: CPT | Mod: GP

## 2025-05-05 PROCEDURE — 97116 GAIT TRAINING THERAPY: CPT | Mod: GP

## 2025-05-05 PROCEDURE — 82040 ASSAY OF SERUM ALBUMIN: CPT | Performed by: STUDENT IN AN ORGANIZED HEALTH CARE EDUCATION/TRAINING PROGRAM

## 2025-05-05 PROCEDURE — 120N000017 HC R&B RESPIRATORY CARE

## 2025-05-05 PROCEDURE — 99233 SBSQ HOSP IP/OBS HIGH 50: CPT | Performed by: HOSPITALIST

## 2025-05-05 PROCEDURE — 250N000013 HC RX MED GY IP 250 OP 250 PS 637: Performed by: INTERNAL MEDICINE

## 2025-05-05 PROCEDURE — 36415 COLL VENOUS BLD VENIPUNCTURE: CPT | Performed by: STUDENT IN AN ORGANIZED HEALTH CARE EDUCATION/TRAINING PROGRAM

## 2025-05-05 PROCEDURE — 999N000157 HC STATISTIC RCP TIME EA 10 MIN

## 2025-05-05 PROCEDURE — 97535 SELF CARE MNGMENT TRAINING: CPT | Mod: GO | Performed by: OCCUPATIONAL THERAPIST

## 2025-05-05 PROCEDURE — 36600 WITHDRAWAL OF ARTERIAL BLOOD: CPT

## 2025-05-05 PROCEDURE — 94799 UNLISTED PULMONARY SVC/PX: CPT

## 2025-05-05 PROCEDURE — 99232 SBSQ HOSP IP/OBS MODERATE 35: CPT | Performed by: NURSE PRACTITIONER

## 2025-05-05 RX ADMIN — ANORECTAL OINTMENT: 15.7; .44; 24; 20.6 OINTMENT TOPICAL at 08:17

## 2025-05-05 RX ADMIN — INSULIN ASPART 15 UNITS: 100 INJECTION, SOLUTION INTRAVENOUS; SUBCUTANEOUS at 09:00

## 2025-05-05 RX ADMIN — APIXABAN 5 MG: 5 TABLET, FILM COATED ORAL at 08:17

## 2025-05-05 RX ADMIN — INSULIN ASPART 1 UNITS: 100 INJECTION, SOLUTION INTRAVENOUS; SUBCUTANEOUS at 12:39

## 2025-05-05 RX ADMIN — UMECLIDINIUM BROMIDE AND VILANTEROL TRIFENATATE 1 PUFF: 62.5; 25 POWDER RESPIRATORY (INHALATION) at 08:17

## 2025-05-05 RX ADMIN — AMLODIPINE BESYLATE 5 MG: 5 TABLET ORAL at 08:17

## 2025-05-05 RX ADMIN — Medication 1 TABLET: at 08:17

## 2025-05-05 RX ADMIN — INSULIN ASPART 22 UNITS: 100 INJECTION, SOLUTION INTRAVENOUS; SUBCUTANEOUS at 12:39

## 2025-05-05 RX ADMIN — ANORECTAL OINTMENT: 15.7; .44; 24; 20.6 OINTMENT TOPICAL at 13:07

## 2025-05-05 RX ADMIN — INSULIN ASPART 1 UNITS: 100 INJECTION, SOLUTION INTRAVENOUS; SUBCUTANEOUS at 08:15

## 2025-05-05 RX ADMIN — ATORVASTATIN CALCIUM 40 MG: 40 TABLET, FILM COATED ORAL at 21:45

## 2025-05-05 RX ADMIN — ANORECTAL OINTMENT: 15.7; .44; 24; 20.6 OINTMENT TOPICAL at 21:45

## 2025-05-05 RX ADMIN — APIXABAN 5 MG: 5 TABLET, FILM COATED ORAL at 21:45

## 2025-05-05 RX ADMIN — INSULIN ASPART 2 UNITS: 100 INJECTION, SOLUTION INTRAVENOUS; SUBCUTANEOUS at 17:56

## 2025-05-05 ASSESSMENT — ACTIVITIES OF DAILY LIVING (ADL)
ADLS_ACUITY_SCORE: 58

## 2025-05-05 ASSESSMENT — VISUAL ACUITY: OU: NOT TESTED

## 2025-05-05 NOTE — PLAN OF CARE
Problem: Wound  Goal: Optimal Coping  Outcome: Progressing  Intervention: Support Patient and Family Response  Recent Flowsheet Documentation  Taken 5/5/2025 0912 by Claribel Santos RN  Supportive Measures:   active listening utilized   decision-making supported  Family/Support System Care: (not at bed side at this moment.) other (see comments)   Goal Outcome Evaluation:         Alert and oriented X4, denies pain, VS stable, , 179, carb count 62, 89, wound vac intact, noguera catheter output 350ml,

## 2025-05-05 NOTE — PROGRESS NOTES
[facility]    Medicine Progress Note - Hospitalist Service    Date of Admission:  4/11/2025  Brief Course Prior to [facility]:  59 up F with h/o intellectual disability & memory issues, COPD, untreated ADEEL, tobacco use d/o, HTN, pAFib, & poorly controlled T2DM s/p previous hospitalization for DKA (3/18 - 3/20) who presented second time to hospital in [place] on 3/22/2025 with complaints of dyspnea, hyperglycemia, and chest pain. She was found to have an YADIRA, LLL PE, DKA, and A-fib with RVR. She was started on an insulin drip, therapeutic dose enoxaparin, Zosyn for possible pneumonia, and diltiazem drip for management of her RVR. Her DKA resolved, she was switched from diltiazem to amiodarone drip for management of A-fib with RVR; however, her degree of respiratory failure was still concerning and she was intubated 3/23 for management of worsening hypoxia.   She had a repeat CTA that demonstrated progression of her PE and RLL collapse concerning for mucous plugging and pneumonia.   She was switched from enoxaparin to heparin drip and her antibiotics were broadened to vancomycin and Zosyn. She was on norepinephrine for several days d/t hypotension attributed to sedation & sepsis-related vasoplegia. It appears that she has been doing fairly long SBTs since 3/26, which seemed to be going reasonably well per RT documentation. Primary team has been having issues w/ progressively increasing FiO2 needs. She had a sputum culture grow Candida & has been on fluconazole since 3/25. Transferred to [facility] d/t concerns that she might require a tracheostomy secondary to her inability to liberate from the vent.   At [facility] patient was admitted to the ICU on a ventilator, successfully extubated 4/1 and downgraded to floor status 4/3.  Infectious disease consulted and continued patient on Zosyn course to be completed through 4/17/2025.  General surgery consulted for right keriy/perineal abscess and performed incision and  drainage with Penrose drain placement 3/31 with subsequent repeat I&D 4/2 and 4/8.  Surgery requests wound VAC be changed by wound care Tuesdays and Thursdays.     LTACH Course:  She transferred from [facility] to [place] for wound cares, therapies, and complex medical care.   4/12-4/14: Needing BiPAP overnight.  4/15-4/20: successfully weaned off NC O2 during the day, completed zosyn 4/17 4/21-4/27- wound is closing well - [facility] plan to remove penrose drain on 4/29/2025. Consider transfer to [facility] TCU for ongoing therapy and wound care.  4/28-5/4:  New information regarding placement needs surfaced early in the week, likely needing group home. Wound vac remains--penrose drain removed--packing to tract along/under R labia. Care conference completed 4/30.    5/5:  slept wih oxymask at 2L overnight.      BARRIERS TO DISCHARGE (why care is unable to be provided at a lower level of care):    Large right groin wound requiring frequent cares, wound vac, nocturnal BIPAP     Things to follow up on:  -unclear what will be best outpatient diabetes regimen as outpatient due to intellectual disability    Assessment & Plan   Acute on Chronic Hypoxic Respiratory Failure  VAP, PTA - resolved  COPD  ADEEL  Transferred from [place] for concerns of tracheostomy need, was able to liberate from ventilator withOUT tracheostomy formation. Weaned to minimal supplemental oxygen support (2-3L nasal).  - Completed zosyn 4/17  - inhalers as needed  - RT following     Perineal Abscess  Condyloma Acuminata  s/p acyclovir and I&D x3  - zosyn completed 4/17  - rectal tube discontinued  - noguera for wound protection  - VAC in place  - Continue wound care per WOC nurse     PE, Acute, segmental, subsegmental  -apixaban 5mg PO BID    Seborrheic dermatitis:  Ketoconazole cream to face     T2DM  Poorly controlled as outpatient with A1c 12.7. Used to be on insulin pump. No longer on it due to intellectual delay.  - BG labile earlier during  admission  - Lantus 30u subcutaneous qAM  - I:C of 1:5 -> I:C 1:4 with meals- discontinue as pt doesn't understand   - ISS high     CKD3b  - Creatinine stable. Monitor     Elevated Hgb in past  Historically high  Consistent with smoking (high CO), untreated ADEEL     History of intellectual delay: supportive care.    PROBLEMS MANAGED During this or previous Encounter Now Stable but Monitoring:    Reactive thrombocytosis:  Platelets historically in normal range (reviewed last 3 years)  Peaked at 700,000, now resolved  Likely  reactive - reviewed flow chart for acute thrombocytosis, posted in note  Normal peripheral smear, liver enzymes           Diet: Snacks/Supplements Adult: Expedite Cup; With Meals  Snacks/Supplements Adult: Magic Cup; With Meals  Combination Diet Moderate Consistent Carb (60 g CHO per Meal) Diet; Easy to Chew (level 7); Thin Liquids (level 0)  Snacks/Supplements Adult: Gelatein 20 (sugar-free); With Meals    DVT Prophylaxis: DOAC  Landrum Catheter: PRESENT, indication: Acute retention or obstruction  Lines: None     Cardiac Monitoring: None  Code Status: Full Code      Clinically Significant Risk Factors               # Hypoalbuminemia: Lowest albumin = 2.9 g/dL at 4/13/2025  2:14 AM, will monitor as appropriate     # Hypertension: Noted on problem list           # DMII: A1C = 12.7 % (Ref range: <5.7 %) within past 6 months       # COPD: noted on problem list        Social Drivers of Health    Tobacco Use: Medium Risk (4/2/2025)    Patient History     Smoking Tobacco Use: Former     Smokeless Tobacco Use: Never     Passive Exposure: Current   Physical Activity: Unknown (5/16/2024)    Exercise Vital Sign     Days of Exercise per Week: 0 days   Social Connections: Unknown (5/16/2024)    Social Connection and Isolation Panel [NHANES]     Frequency of Social Gatherings with Friends and Family: More than three times a week          Disposition Plan     Medically Ready for Discharge: Anticipated in 5+  Days         Karolina Miramontes MD  Hospitalist Service  LTACH  Securely message with [employer] (more info)  Text page via [employer] Paging/Directory   ______________________________________________________________________    Interval History   NO overnight events. Slept with oxy mask at 2L overnight.  No dyspnea  No chest pain   Intermittent abdominal pain   +intermittent nausea       Physical Exam   Vital Signs: Temp: 97.6  F (36.4  C) Temp src: Axillary BP: 110/67 Pulse: 74   Resp: 20 SpO2: 94 % O2 Device: None (Room air) Oxygen Delivery: 2 LPM  Weight: 183 lbs 0 oz    General Appearance: Older F in NAD  Respiratory:  rhonchi bilaterally  Cardiovascular: RRR S1S2  GI: soft, NT  Skin: Face with areas of somewhat inflamed acne. Also wide-spread seborrheic dermatitis.  Large right groin wound - see WOC note - dressing in place  Neuro: Alert, generally nonfocal on motor and sensory testing      Medical Decision Making       55 MINUTES SPENT BY ME on the date of service doing chart review, history, exam, documentation & further activities per the note.      Data     I have personally reviewed the following data over the past 24 hrs:    8.2  \   11.8   / 304     142 103 49.6 (H) /  179 (H)   4.3 30 (H) 1.21 (H) \     ALT: 13 AST: 15 AP: 87 TBILI: 0.4   ALB: 3.4 (L) TOT PROTEIN: 6.9 LIPASE: N/A     Procal: N/A CRP: N/A Lactic Acid: 1.1         Imaging results reviewed over the past 24 hrs:   No results found for this or any previous visit (from the past 24 hours).

## 2025-05-05 NOTE — PROVIDER NOTIFICATION
05/04/25 2344   Vital Signs   Resp 20   Pulse 83   Pulse Rate Source Monitor   Oxygen Therapy   SpO2 96 %   O2 Device Oxymask   Oxygen Delivery 2 LPM   Assessment   Respiratory WDL X;breath sounds   Rhythm/Pattern, Respiratory unlabored   Expansion/Accessory Muscles/Retractions no use of accessory muscles;expansion symmetric   Cough Frequency no cough   Airway Safety Measures all equipment/monitors on and audible     Will obtain ABG in a.m.   Recent Labs   Lab 05/05/25  0553 04/30/25  0613   PH 7.45 7.43   PCO2 42 44   PO2 81 68*   HCO3 29* 29*

## 2025-05-05 NOTE — PROGRESS NOTES
CLINICAL NUTRITION SERVICES - REASSESSMENT NOTE     RECOMMENDATIONS FOR MDs/PROVIDERS TO ORDER:  None    Registered Dietitian Interventions:  Continue current oral nutrition supplements     Future/Additional Recommendations:  Monitor PO intakes, wound healing, weight trends     INFORMATION OBTAINED  Assessed patient in room.    CURRENT NUTRITION ORDERS  Diet: Orders Placed This Encounter      Combination Diet Moderate Consistent Carb (60 g CHO per Meal) Diet; Easy to Chew (level 7); Thin Liquids (level 0)    Snacks/Supplements: Gelatein 20 BID, Magic Cup daily, and Expedite cup      CURRENT INTAKE/TOLERANCE  Patient has a good appetite, she states that she likes her oral nutrition supplements and is eating them all as ordered. No GI issues noted.     NEW FINDINGS  GI symptoms:  Stooling 1-3x daily, had up to 5 BMs on 5/1 but has since regulated. LBM yesterday x1       Skin/wounds: Right groin surgical wound improving per WOC RN note today  Stage 2 coccyx PI not noted in today's WOC RN note    Nutrition-relevant labs:  mg/dL (H) yesterday    Nutrition-relevant medications: novolog, lantus, MVI/M     Weight: relatively stable this admission, 183 lbs 0 oz today    ASSESSED NUTRITION NEEDS  Dosing Weight: 83 kg, based on actual wt  Estimated Energy Needs: 1410-7052 kcals/day (25-30 kcals/kg)  Justification: Obese  Estimated Protein Needs: 100-125 grams protein/day (1.2 - 1.5 grams of pro/kg)  Justification: CKD and Wound healing  Estimated Fluid Needs: 1 mL/kcal  Justification: Maintenance    MALNUTRITION  % Intake: No decreased intake noted  % Weight Loss: Weight loss does not meet criteria   Subcutaneous Fat Loss: None observed  Muscle Loss: None observed  Fluid Accumulation/Edema: Moderate to severe, 2-4+  Malnutrition Diagnosis: Patient does not meet two of the established criteria necessary for diagnosing malnutrition  Malnutrition Present on Admission: No    EVALUATION OF THE PROGRESS TOWARD GOALS   Previous  Goals  - Blood glucose 140-180 mg/dL - not met, hyperglycemia  - Patient to consume % of nutritionally adequate meal trays TID, or the equivalent with supplements/snacks - met  - Wound healing per WOC documentation - progressing    Previous Nutrition Diagnosis  Increased nutrient needs (protein) related to wound healing as evidenced by R-groin surgical wound, stg 2 PI bilateral buttocks.   Evaluation: Improving    NUTRITION DIAGNOSIS  Increased nutrient needs (protein) related to wound healing as evidenced by R-groin surgical wound.    INTERVENTIONS  Manage composition of oral intake  Medical food supplement therapy    GOALS  - Blood glucose 140-180 mg/dL - not met  - Patient to consume % of nutritionally adequate meal trays TID, or the equivalent with supplements/snacks - met  - Wound healing per WOC documentation - progressing     MONITORING/EVALUATION  Progress toward goals will be monitored and evaluated per policy.    TAMAR Mcmahon RDN  [facility]  Office: [phone] or [place]

## 2025-05-05 NOTE — PROGRESS NOTES
Pulmonary Progress Note    Admit Date: 4/11/2025  CODE: Full Code    Assessment:   Marly Cannon is a 59 year old woman with history of intellectual disability & memory issues, COPD, untreated ADEEL, tobacco use, HTN, pAFib, & poorly controlled T2DM s/p previous hospitalization for DKA (3/18 - 3/20).      Admitted 3/22/2025 and treated for YADIRA, LLL PE, DKA, pneumonia, and A-fib with RVR. Intubated 3/23 for worsening hypoxia. CTA demonstrated PE progression and RLL collapse concerning for mucous plugging and pneumonia. Sputum culture grew Candida. Extubated 4/1. Completed course of antibiotics & antifungals. General surgery consulted for right keiry/perineal abscess and performed incision and drainage with Penrose drain 3/31 with subsequent repeat I&D 4/2 and 4/8. To [place] on 4/11 & weaned to room air during the day and using BiPAP at night. Most recent imaging show largely clear lungs bilaterally w mild bibasilar atelectasis. Consulted to see if qualifies for home BiPAP. Patient has been off BiPAP now since 4/25. Work-up demonstrates she does not qualify for BiPAP at this time with serial normal ABGs, most recent today(pH 7.45/CO2 42). She can continue with nocturnal O2 at this time which likely is a more appropriate option given her underlying intellectual disability with history of poor compliance with CPAP/BiPAP.       Acute on chronic hypoxic and hypercapnic respiratory failure  Room air during the day and 2-3L/min O2 at night when sleeping   PE on apixaban  COPD: 40+ pack year smoker with severe obstruction & possible fixed upper airway obstruction on spirometry: on Stiolto(LAMA/LABA) PTA, currently on Anoro Ellipta here  ADEEL, Sleep hypoxemia and hypoventilation: sleep titration study 08/2023 required BiPAP 22/18. Poor compliance previously and patient stopped using. Currently doesn't qualify for BiPAP under hypoventilation or COPD pathway   Recent pneumonia completed antibiotics   intellectual disability &  memory issues suspect main reason leading to prior BiPAP compliance     Plan:     Discontinue BiPAP  Continue nocturnal oxygen PRN - repeat overnight oximetry closer to discharge   Anoro Ellipta inhaler. Nebs PRN  IS and [facility] for pulmonary hygiene    Recommend follow up with sleep medicine after discharge if she wishes to pursue CPAP/BiPAP again   Our service will sign off     Clinical status discussed today with respiratory therapist and patient   Subjective    No complaints today   Denies dyspnea  Sleeping well     Data:   /67 (BP Location: Left arm)   Pulse 74   Temp 97.6  F (36.4  C) (Axillary)   Resp 20   Wt 83 kg (183 lb)   SpO2 94%   BMI 30.45 kg/m       I/O last 3 completed shifts:  In: 780 [P.O.:780]  Out: 650 [Urine:650]  Weight change:     Resp: 20    Exam:   Gen: No acute distress  HEENT: NT  CV: RRR, no m/g/r  Resp: CTAB; non-labored   Abd: soft, nontender, BS+  Skin: no visible rashes or lesions  Ext: no edema  Neuro: PERRL, nonfocal exam, forgetful     ROS: A complete 10-system review of systems was obtained and is negative with the exception of what is noted in the subjective history.    Medications:     Current Facility-Administered Medications   Medication Dose Route Frequency Provider Last Rate Last Admin     Current Facility-Administered Medications   Medication Dose Route Frequency Provider Last Rate Last Admin    amLODIPine (NORVASC) tablet 5 mg  5 mg Oral Daily Tomas Benitez MD   5 mg at 05/05/25 0817    apixaban ANTICOAGULANT (ELIQUIS) tablet 5 mg  5 mg Oral BID Tomas Benitez MD   5 mg at 05/05/25 0817    atorvastatin (LIPITOR) tablet 40 mg  40 mg Oral At Bedtime Tomas Benitez MD   40 mg at 05/04/25 2043    insulin aspart (NovoLOG) injection (RAPID ACTING)  1-7 Units Subcutaneous TID AC Alfred Patel MD   1 Units at 05/05/25 1239    insulin aspart (NovoLOG) injection (RAPID ACTING)  1-5 Units Subcutaneous At Bedtime Alfred Patel MD   1 Units at 05/02/25 2102     insulin glargine (LANTUS PEN) injection 30 Units  30 Units Subcutaneous At Bedtime Alfred Patel MD   30 Units at 05/04/25 2047    menthol-zinc oxide (CALMOSEPTINE) 0.44-20.6 % ointment OINT   Topical TID Yolis Olvera MD   Given at 05/05/25 0817    multivitamin w/minerals (THERA-VIT-M) tablet 1 tablet  1 tablet Oral Daily Andrez Mueller MD   1 tablet at 05/05/25 0817    umeclidinium-vilanterol (ANORO ELLIPTA) 62.5-25 MCG/ACT oral inhaler 1 puff  1 puff Inhalation Daily Tomas Benitez MD   1 puff at 05/05/25 0817       Labs:   All laboratory and radiology has been personally reviewed by myself today.  Arterial Blood Gases   Recent Labs   Lab 05/05/25  0553 04/30/25 0613   PH 7.45 7.43   PCO2 42 44   PO2 81 68*   HCO3 29* 29*     Complete Blood Count   Recent Labs   Lab 05/05/25  0654 05/05/25  0553 05/02/25  0643 04/30/25  0613 04/29/25 0625   WBC 8.2  --   --   --  10.4   HGB 11.8 11.1*  --  11.5* 12.0     --  319  --  326     Basic Metabolic Panel  Recent Labs   Lab 05/05/25  1211 05/05/25  0813 05/05/25  0654 05/05/25  0553 05/02/25  0818 05/02/25  0643 04/30/25  0805 04/30/25  0613 04/29/25  0746 04/29/25  0625   NA  --   --  142 140  --   --   --  138  --  137   POTASSIUM  --   --  4.3 4.0  --   --   --  4.3  --  4.2   CHLORIDE  --   --  103  --   --   --   --   --   --  99   CO2  --   --  30*  --   --   --   --   --   --  30*   BUN  --   --  49.6*  --   --   --   --   --   --  43.7*   CR  --   --  1.21*  --   --  1.29*  --   --   --  1.20*   * 147* 132* 132*   < >  --    < > 182*   < > 130*    < > = values in this interval not displayed.         Radiology: Personally reviewed; radiology read below     XR CHEST PORT 1 VIEW  LOCATION: [facility]  DATE: 4/9/2025     INDICATION: VAP follow up  COMPARISON: 4/5/2025 radiograph. 4/7/2025 CT.                                                                      IMPRESSION: A right IJ catheter terminates over the right  upper mediastinum. No pleural fluid appreciated on this study. No pneumothorax. No definite airspace disease. Normal size of the hear    Bonilla Miller CNP  Pulmonary Medicine  [facility]  Pager [phone]  Office: [phone]

## 2025-05-05 NOTE — PLAN OF CARE
Problem: Wound  Goal: Optimal Functional Ability  Intervention: Optimize Functional Ability  Recent Flowsheet Documentation  Taken 5/5/2025 0146 by Romeo Diaz Jr RN  Assistive Device Utilized:   gait belt   walker  Activity Management: activity adjusted per tolerance  Activity Assistance Provided: assistance, 1 person     The patient is pleasant and cooperative during the care, denies any pain, has an intact wound VAC, afebrile and exhibits no shortness of breath. The patient reports sleeping well throughout the night with oxymask at 2Lpm. ABG done today please see chart.     Vital signs:  Temp: 97.7  F (36.5  C) Temp src: Axillary BP: 134/73 Pulse: 83   Resp: 20 SpO2: 97 % O2 Device: Oxymask Oxygen Delivery: 2 LPM

## 2025-05-06 ENCOUNTER — APPOINTMENT (OUTPATIENT)
Dept: PHYSICAL THERAPY | Facility: CLINIC | Age: 60
DRG: 602 | End: 2025-05-06
Attending: INTERNAL MEDICINE
Payer: COMMERCIAL

## 2025-05-06 ENCOUNTER — APPOINTMENT (OUTPATIENT)
Dept: OCCUPATIONAL THERAPY | Facility: CLINIC | Age: 60
DRG: 602 | End: 2025-05-06
Attending: INTERNAL MEDICINE
Payer: COMMERCIAL

## 2025-05-06 LAB
GLUCOSE BLDC GLUCOMTR-MCNC: 219 MG/DL (ref 70–99)
GLUCOSE BLDC GLUCOMTR-MCNC: 281 MG/DL (ref 70–99)
GLUCOSE BLDC GLUCOMTR-MCNC: 330 MG/DL (ref 70–99)
GLUCOSE BLDC GLUCOMTR-MCNC: 340 MG/DL (ref 70–99)
GLUCOSE BLDC GLUCOMTR-MCNC: 343 MG/DL (ref 70–99)

## 2025-05-06 PROCEDURE — 97110 THERAPEUTIC EXERCISES: CPT | Mod: GO | Performed by: OCCUPATIONAL THERAPIST

## 2025-05-06 PROCEDURE — 97110 THERAPEUTIC EXERCISES: CPT | Mod: GP | Performed by: PHYSICAL THERAPIST

## 2025-05-06 PROCEDURE — 250N000013 HC RX MED GY IP 250 OP 250 PS 637: Performed by: INTERNAL MEDICINE

## 2025-05-06 PROCEDURE — 120N000017 HC R&B RESPIRATORY CARE

## 2025-05-06 PROCEDURE — 97129 THER IVNTJ 1ST 15 MIN: CPT | Mod: GO | Performed by: OCCUPATIONAL THERAPIST

## 2025-05-06 PROCEDURE — 99233 SBSQ HOSP IP/OBS HIGH 50: CPT | Performed by: HOSPITALIST

## 2025-05-06 PROCEDURE — 97605 NEG PRS WND THER DME<=50SQCM: CPT

## 2025-05-06 PROCEDURE — 999N000157 HC STATISTIC RCP TIME EA 10 MIN

## 2025-05-06 PROCEDURE — 250N000013 HC RX MED GY IP 250 OP 250 PS 637: Performed by: STUDENT IN AN ORGANIZED HEALTH CARE EDUCATION/TRAINING PROGRAM

## 2025-05-06 PROCEDURE — 999N000123 HC STATISTIC OXYGEN O2DAILY TECH TIME

## 2025-05-06 RX ADMIN — OXYCODONE HYDROCHLORIDE 10 MG: 10 TABLET ORAL at 12:01

## 2025-05-06 RX ADMIN — INSULIN ASPART 5 UNITS: 100 INJECTION, SOLUTION INTRAVENOUS; SUBCUTANEOUS at 11:43

## 2025-05-06 RX ADMIN — AMLODIPINE BESYLATE 5 MG: 5 TABLET ORAL at 08:21

## 2025-05-06 RX ADMIN — APIXABAN 5 MG: 5 TABLET, FILM COATED ORAL at 08:21

## 2025-05-06 RX ADMIN — UMECLIDINIUM BROMIDE AND VILANTEROL TRIFENATATE 1 PUFF: 62.5; 25 POWDER RESPIRATORY (INHALATION) at 08:22

## 2025-05-06 RX ADMIN — ANORECTAL OINTMENT: 15.7; .44; 24; 20.6 OINTMENT TOPICAL at 21:35

## 2025-05-06 RX ADMIN — INSULIN ASPART 4 UNITS: 100 INJECTION, SOLUTION INTRAVENOUS; SUBCUTANEOUS at 18:15

## 2025-05-06 RX ADMIN — APIXABAN 5 MG: 5 TABLET, FILM COATED ORAL at 21:35

## 2025-05-06 RX ADMIN — Medication 1 TABLET: at 08:21

## 2025-05-06 RX ADMIN — ANORECTAL OINTMENT: 15.7; .44; 24; 20.6 OINTMENT TOPICAL at 08:22

## 2025-05-06 RX ADMIN — ATORVASTATIN CALCIUM 40 MG: 40 TABLET, FILM COATED ORAL at 21:35

## 2025-05-06 RX ADMIN — INSULIN ASPART 2 UNITS: 100 INJECTION, SOLUTION INTRAVENOUS; SUBCUTANEOUS at 08:20

## 2025-05-06 ASSESSMENT — ACTIVITIES OF DAILY LIVING (ADL)
ADLS_ACUITY_SCORE: 58

## 2025-05-06 NOTE — PLAN OF CARE
Problem: Pain Acute  Goal: Optimal Pain Control and Function  Outcome: Progressing  Intervention: Optimize Psychosocial Wellbeing  Recent Flowsheet Documentation  Taken 5/6/2025 0155 by Kristina Story RN  Supportive Measures: active listening utilized  Intervention: Develop Pain Management Plan  Recent Flowsheet Documentation  Taken 5/6/2025 0155 by Kristina Story RN  Pain Management Interventions: medication (see MAR)  Intervention: Prevent or Manage Pain  Recent Flowsheet Documentation  Taken 5/6/2025 0155 by Kristina Story RN  Sensory Stimulation Regulation: quiet environment promoted  Bowel Elimination Promotion: adequate fluid intake promoted  Medication Review/Management: medications reviewed     Problem: Wound  Goal: Optimal Pain Control and Function  Outcome: Progressing  Intervention: Prevent or Manage Pain  Recent Flowsheet Documentation  Taken 5/6/2025 0155 by Kristina Story RN  Pain Management Interventions: medication (see MAR)  Goal: Skin Health and Integrity  Outcome: Progressing  Intervention: Optimize Skin Protection  Recent Flowsheet Documentation  Taken 5/6/2025 0155 by Kristina Story RN  Pressure Reduction Devices: heel offloading device utilized  Activity Management: activity adjusted per tolerance  Goal: Optimal Wound Healing  Outcome: Progressing     Problem: Glycemic Control Impaired  Goal: Blood Glucose Level Within Targeted Range  Outcome: Progressing  Intervention: Optimize Glycemic Control  Recent Flowsheet Documentation  Taken 5/6/2025 0155 by Kristina Story RN  Hyperglycemia Management: blood glucose monitored   Goal Outcome Evaluation:    Patient appeared to rest well between cares, vss, denied pain or discomfort, alert x 4, no change in alertness or LOC, wound vac on and patent, drsg to site intact, no prn's adm or requested, continues on heparin, see flowsheets for output will continue to monitor.

## 2025-05-06 NOTE — PROVIDER NOTIFICATION
05/06/25 0348   Vital Signs   Resp 16   Pulse 89   Pulse Rate Source Monitor   Oxygen Therapy   SpO2 92 %   O2 Device Nasal cannula with humidification   Oxygen Delivery (S)  2 LPM   Assessment   Respiratory WDL WDL   Rhythm/Pattern, Respiratory pattern regular   Expansion/Accessory Muscles/Retractions no use of accessory muscles   Airway Safety Measures all equipment/monitors on and audible     Patient started on RA, desaturated to 87%. 2 L O2 added to maintain SPO2 in 90's. Will continue to monitor.

## 2025-05-06 NOTE — PROGRESS NOTES
05/06/25 1200   Appointment Info   Signing Clinician's Name / Credentials (OT) Tricia Dior OTR/L   Cognitive Screens/Assessments   Cognitive Assessments Completed ACLS   Arsh Cognitive Level Screen (ACLS) Score 4.6/5.8   Arsh Cognitive Level Screen (ACLS) Domains assessed: learning and problem solving via activity based assessment   Arsh Cognitive Level Screen (ACLS) Interpretation Mild cog impairment, daily supervision recommended to assist with IADLs, safety and problem solving.   OT Goals   OT: Cognitive Completed   Cognitive Retraining   Cognitive Skills Intervention 1st 15 Minutes Timed (20147) 15   Symptoms Noted During/After Treatment None   Treatment Detail/Skilled Intervention ACL completed, see details above.   Therapeutic Procedures/Exercise   Therapeutic Procedure: strength, endurance, ROM, flexibillity minutes (60394) 10   Symptoms Noted During/After Treatment none   Treatment Detail/Skilled Intervention Facilitated FB ex to progress strength and activity tolerance for ADL IND. Standing ex for core stability, using 1-3# DB with bimanual ex, 4 sets of 10 reps. Seated ex 2 sets for 10 reps of bicep and shoulder ex. Brief rest break when seated, no LOB with dynamic standing ex.   OT Discharge Planning   OT Plan 5/6: dressing including clothing retrieval, standing at sink for g/h, toileting in BR/toilet transfers, UB ex, bed mobility, sock aid in room for LB dressing   OT Discharge Recommendation (DC Rec) Transitional Care Facility   OT Rationale for DC Rec pt may require further therapy prior to dc to home.   OT Brief overview of current status Making good progress with activity tolerance and strength, cog appears stable with recommendation for daily supervision with I/ADLs. Cont POC

## 2025-05-06 NOTE — PROGRESS NOTES
[facility]    Medicine Progress Note - Hospitalist Service    Date of Admission:  4/11/2025  Brief Course Prior to [facility]:  59 up F with h/o intellectual disability & memory issues, COPD, untreated ADEEL, tobacco use d/o, HTN, pAFib, & poorly controlled T2DM s/p previous hospitalization for DKA (3/18 - 3/20) who presented second time to hospital in [place] on 3/22/2025 with complaints of dyspnea, hyperglycemia, and chest pain. She was found to have an YADIRA, LLL PE, DKA, and A-fib with RVR. She was started on an insulin drip, therapeutic dose enoxaparin, Zosyn for possible pneumonia, and diltiazem drip for management of her RVR. Her DKA resolved, she was switched from diltiazem to amiodarone drip for management of A-fib with RVR; however, her degree of respiratory failure was still concerning and she was intubated 3/23 for management of worsening hypoxia.   She had a repeat CTA that demonstrated progression of her PE and RLL collapse concerning for mucous plugging and pneumonia.   She was switched from enoxaparin to heparin drip and her antibiotics were broadened to vancomycin and Zosyn. She was on norepinephrine for several days d/t hypotension attributed to sedation & sepsis-related vasoplegia. It appears that she has been doing fairly long SBTs since 3/26, which seemed to be going reasonably well per RT documentation. Primary team has been having issues w/ progressively increasing FiO2 needs. She had a sputum culture grow Candida & has been on fluconazole since 3/25. Transferred to [facility] d/t concerns that she might require a tracheostomy secondary to her inability to liberate from the vent.   At [facility] patient was admitted to the ICU on a ventilator, successfully extubated 4/1 and downgraded to floor status 4/3.  Infectious disease consulted and continued patient on Zosyn course to be completed through 4/17/2025.  General surgery consulted for right keiry/perineal abscess and performed incision and  drainage with Penrose drain placement 3/31 with subsequent repeat I&D 4/2 and 4/8.  Surgery requests wound VAC be changed by wound care Tuesdays and Thursdays.     LTACH Course:  She transferred from [facility] to [place] for wound cares, therapies, and complex medical care.   4/12-4/14: Needing BiPAP overnight.  4/15-4/20: successfully weaned off NC O2 during the day, completed zosyn 4/17 4/21-4/27- wound is closing well - [facility] plan to remove penrose drain on 4/29/2025. Consider transfer to [facility] for ongoing therapy and wound care.  4/28-5/4:  New information regarding placement needs surfaced early in the week, likely needing group home. Wound vac remains--penrose drain removed--packing to tract along/under R labia. Care conference completed 4/30.    5/5:  slept wih oxymask at 2L overnight.    5/6: Glucose is still elevated will increase glargine to 35 units    BARRIERS TO DISCHARGE (why care is unable to be provided at a lower level of care):    Large right groin wound requiring frequent cares, wound vac, nocturnal BIPAP     Things to follow up on:  -unclear what will be best outpatient diabetes regimen as outpatient due to intellectual disability    Assessment & Plan   Acute on Chronic Hypoxic Respiratory Failure  VAP, PTA - resolved  COPD  ADEEL  Transferred from [place] for concerns of tracheostomy need, was able to liberate from ventilator withOUT tracheostomy formation. Weaned to minimal supplemental oxygen support (2-3L nasal).  - Completed zosyn 4/17  - inhalers as needed  - RT following     Perineal Abscess  Condyloma Acuminata  s/p acyclovir and I&D x3  - zosyn completed 4/17  - rectal tube discontinued  - noguera for wound protection  - VAC in place  - Continue wound care per WOC nurse     PE, Acute, segmental, subsegmental  -apixaban 5mg PO BID    Seborrheic dermatitis:  Ketoconazole cream to face     T2DM  Poorly controlled as outpatient with A1c 12.7. Used to be on insulin pump. No longer on  it due to intellectual delay.  - BG labile earlier during admission  - Lantus 30u subcutaneous qAM--increased lantus to 35 unit(s) on 5/6  - I:C of 1:5 -> I:C 1:4 with meals- discontinue as pt doesn't understand   -add novolog 7 unit(s) tid w/ meals  - ISS high     CKD3b  - Creatinine stable. Monitor     Elevated Hgb in past  Historically high  Consistent with smoking (high CO), untreated ADEEL     History of intellectual delay: supportive care.    PROBLEMS MANAGED During this or previous Encounter Now Stable but Monitoring:    Reactive thrombocytosis:  Platelets historically in normal range (reviewed last 3 years)  Peaked at 700,000, now resolved  Likely  reactive - reviewed flow chart for acute thrombocytosis, posted in note  Normal peripheral smear, liver enzymes           Diet: Snacks/Supplements Adult: Expedite Cup; With Meals  Snacks/Supplements Adult: Magic Cup; With Meals  Combination Diet Moderate Consistent Carb (60 g CHO per Meal) Diet; Easy to Chew (level 7); Thin Liquids (level 0)  Snacks/Supplements Adult: Gelatein 20 (sugar-free); With Meals    DVT Prophylaxis: DOAC  Landrum Catheter: PRESENT, indication: Wound deterioration and failed external collection device  Lines: None     Cardiac Monitoring: None  Code Status: Full Code      Clinically Significant Risk Factors               # Hypoalbuminemia: Lowest albumin = 2.9 g/dL at 4/13/2025  2:14 AM, will monitor as appropriate     # Hypertension: Noted on problem list           # DMII: A1C = 12.7 % (Ref range: <5.7 %) within past 6 months       # COPD: noted on problem list        Social Drivers of Health    Tobacco Use: Medium Risk (4/2/2025)    Patient History     Smoking Tobacco Use: Former     Smokeless Tobacco Use: Never     Passive Exposure: Current   Physical Activity: Unknown (5/16/2024)    Exercise Vital Sign     Days of Exercise per Week: 0 days   Social Connections: Unknown (5/16/2024)    Social Connection and Isolation Panel [NHANES]      Frequency of Social Gatherings with Friends and Family: More than three times a week          Disposition Plan     Medically Ready for Discharge: Anticipated in 5+ Days         Karolina Miramontes MD  Hospitalist Service  LTACH  Securely message with [facility] (more info)  Text page via [employer] Paging/Directory   ______________________________________________________________________    Interval History   NO overnight events.   No dyspnea  No chest pain   No further abdominal pain   No further  nausea       Physical Exam   Vital Signs: Temp: 98.2  F (36.8  C) Temp src: Oral BP: 130/67 Pulse: 89   Resp: 18 SpO2: 95 % O2 Device: None (Room air) Oxygen Delivery: (S) 2 LPM  Weight: 183 lbs 0 oz    General Appearance:  NAD  Respiratory:  rhonchi bilaterally  Cardiovascular: RRR S1S2  GI: soft, NT  Skin: .  wide-spread seborrheic dermatitis.  Large right groin wound - see WOC note - dressing in place  Neuro: Alert, generally nonfocal on motor and sensory testing      Medical Decision Making       57 MINUTES SPENT BY ME on the date of service doing chart review, history, exam, documentation & further activities per the note.      Data         Imaging results reviewed over the past 24 hrs:   No results found for this or any previous visit (from the past 24 hours).

## 2025-05-06 NOTE — PROGRESS NOTES
Pulmonary Progress Note    Admit Date: 4/11/2025  CODE: Full Code    Assessment:   Marly Cannon is a 59 year old woman with history of intellectual disability & memory issues, COPD, untreated ADEEL, tobacco use, HTN, pAFib, & poorly controlled T2DM s/p previous hospitalization for DKA (3/18 - 3/20).      Admitted 3/22/2025 and treated for YADIRA, LLL PE, DKA, pneumonia, and A-fib with RVR. Intubated 3/23 for worsening hypoxia. CTA demonstrated PE progression and RLL collapse concerning for mucous plugging and pneumonia. Sputum culture grew Candida. Extubated 4/1. Completed course of antibiotics & antifungals. General surgery consulted for right keiry/perineal abscess and performed incision and drainage with Penrose drain 3/31 with subsequent repeat I&D 4/2 and 4/8. To [facility] on 4/11 & weaned to room air during the day and using BiPAP at night. Most recent imaging show largely clear lungs bilaterally w mild bibasilar atelectasis. Consulted to see if qualifies for home BiPAP. Patient has been off BiPAP now since 4/25. Work-up demonstrates she does not qualify for BiPAP at this time with serial normal ABGs, most recent today(pH 7.45/CO2 42). She can continue with nocturnal O2 at this time which likely is a more appropriate option given her underlying intellectual disability with history of poor compliance with CPAP/BiPAP.       Acute on chronic hypoxic and hypercapnic respiratory failure  Room air during the day and 2-3L/min O2 at night when sleeping   PE on apixaban  COPD: 40+ pack year smoker with severe obstruction & possible fixed upper airway obstruction on spirometry: on Stiolto(LAMA/LABA) PTA, currently on Anoro Ellipta here  ADEEL, Sleep hypoxemia and hypoventilation: sleep titration study 08/2023 required BiPAP 22/18. Poor compliance previously and patient stopped using. Currently doesn't qualify for BiPAP under hypoventilation or COPD pathway   Recent pneumonia completed antibiotics   intellectual disability &  memory issues suspect main reason leading to prior BiPAP compliance     Plan:     Discontinue BiPAP  Continue nocturnal oxygen PRN - repeat overnight oximetry closer to discharge   Anoro Ellipta inhaler. Nebs PRN  IS and [facility] for pulmonary hygiene    Recommend follow up with sleep medicine after discharge if she wishes to pursue CPAP/BiPAP again   Our service will sign off     Clinical status discussed today with respiratory therapist and patient   Subjective    No complaints today   Denies dyspnea  Sleeping well     Data:   /67 (BP Location: Left arm, Patient Position: Right side, Cuff Size: Adult Regular)   Pulse 89   Temp 98.2  F (36.8  C) (Oral)   Resp 18   Wt 83 kg (183 lb)   SpO2 95%   BMI 30.45 kg/m    BP - Mean:  [92] 92  I/O last 3 completed shifts:  In: 917 [P.O.:917]  Out: 1450 [Urine:1450]  Weight change:     Resp: 18    Exam:   Gen: No acute distress  HEENT: NT  CV: RRR, no m/g/r  Resp: CTAB; non-labored   Abd: soft, nontender, BS+  Skin: no visible rashes or lesions  Ext: no edema  Neuro: PERRL, nonfocal exam, forgetful     ROS: A complete 10-system review of systems was obtained and is negative with the exception of what is noted in the subjective history.    Medications:     Current Facility-Administered Medications   Medication Dose Route Frequency Provider Last Rate Last Admin     Current Facility-Administered Medications   Medication Dose Route Frequency Provider Last Rate Last Admin    amLODIPine (NORVASC) tablet 5 mg  5 mg Oral Daily Tomas Benitez MD   5 mg at 05/06/25 0821    apixaban ANTICOAGULANT (ELIQUIS) tablet 5 mg  5 mg Oral BID Tomas Benitez MD   5 mg at 05/06/25 0821    atorvastatin (LIPITOR) tablet 40 mg  40 mg Oral At Bedtime Tomas Benitez MD   40 mg at 05/05/25 2145    insulin aspart (NovoLOG) injection (RAPID ACTING)  1-7 Units Subcutaneous TID Alfred Gentile MD   2 Units at 05/06/25 0820    insulin aspart (NovoLOG) injection (RAPID ACTING)  1-5 Units  Subcutaneous At Bedtime Alfred Patel MD   2 Units at 05/05/25 2142    insulin glargine (LANTUS PEN) injection 35 Units  35 Units Subcutaneous At Bedtime Karolina Miramontes MD        menthol-zinc oxide (CALMOSEPTINE) 0.44-20.6 % ointment OINT   Topical TID Yolis Olvera MD   Given at 05/06/25 0822    multivitamin w/minerals (THERA-VIT-M) tablet 1 tablet  1 tablet Oral Daily Andrez Mueller MD   1 tablet at 05/06/25 0821    umeclidinium-vilanterol (ANORO ELLIPTA) 62.5-25 MCG/ACT oral inhaler 1 puff  1 puff Inhalation Daily Tomas Benitez MD   1 puff at 05/06/25 0822       Labs:   All laboratory and radiology has been personally reviewed by myself today.  Arterial Blood Gases   Recent Labs   Lab 05/05/25 0553 04/30/25  0613   PH 7.45 7.43   PCO2 42 44   PO2 81 68*   HCO3 29* 29*     Complete Blood Count   Recent Labs   Lab 05/05/25  0654 05/05/25  0553 05/02/25  0643 04/30/25  0613   WBC 8.2  --   --   --    HGB 11.8 11.1*  --  11.5*     --  319  --      Basic Metabolic Panel  Recent Labs   Lab 05/06/25  0746 05/05/25  2142 05/05/25  1706 05/05/25  1211 05/05/25  0813 05/05/25  0654 05/05/25  0553 05/02/25  0818 05/02/25  0643 04/30/25  0805 04/30/25  0613   NA  --   --   --   --   --  142 140  --   --   --  138   POTASSIUM  --   --   --   --   --  4.3 4.0  --   --   --  4.3   CHLORIDE  --   --   --   --   --  103  --   --   --   --   --    CO2  --   --   --   --   --  30*  --   --   --   --   --    BUN  --   --   --   --   --  49.6*  --   --   --   --   --    CR  --   --   --   --   --  1.21*  --   --  1.29*  --   --    * 299* 222* 179*   < > 132* 132*   < >  --    < > 182*    < > = values in this interval not displayed.         Radiology: Personally reviewed; radiology read below     XR CHEST PORT 1 VIEW  LOCATION: [facility]  DATE: 4/9/2025     INDICATION: VAP follow up  COMPARISON: 4/5/2025 radiograph. 4/7/2025 CT.                                                                       IMPRESSION: A right IJ catheter terminates over the right upper mediastinum. No pleural fluid appreciated on this study. No pneumothorax. No definite airspace disease. Normal size of the hear    Bonilla Miller CNP  Pulmonary Medicine  [facility]  Pager [phone]  Office: [phone]

## 2025-05-06 NOTE — PLAN OF CARE
Problem: Adult Inpatient Plan of Care  Goal: Plan of Care Review  Description: The Plan of Care Review/Shift note should be completed every shift.  The Outcome Evaluation is a brief statement about your assessment that the patient is improving, declining, or no change.  This information will be displayed automatically on your shiftnote.  Outcome: Progressing  Flowsheets (Taken 5/5/2025 2353)  Plan of Care Reviewed With: patient  Overall Patient Progress: improving     Problem: Pain Acute  Goal: Optimal Pain Control and Function  Outcome: Progressing  Intervention: Optimize Psychosocial Wellbeing  Recent Flowsheet Documentation  Taken 5/5/2025 1840 by Arlette Griffith RN  Supportive Measures:   active listening utilized   decision-making supported  Diversional Activities: television  Intervention: Prevent or Manage Pain  Recent Flowsheet Documentation  Taken 5/5/2025 1840 by Arlette Griffith RN  Sensory Stimulation Regulation: television on  Sleep/Rest Enhancement:   consistent schedule promoted   noise level reduced   relaxation techniques promoted  Complementary Therapy: (TV  on.) --  Medication Review/Management: medications reviewed     Problem: Wound  Goal: Optimal Coping  Outcome: Progressing  Intervention: Support Patient and Family Response  Recent Flowsheet Documentation  Taken 5/5/2025 1840 by Arlette Griffith RN  Supportive Measures:   active listening utilized   decision-making supported  Family/Support System Care: (NOt  present  at  bedside.) other (see comments)  Goal: Improved Oral Intake  Outcome: Progressing     Problem: Glycemic Control Impaired  Goal: Blood Glucose Level Within Targeted Range  Outcome: Progressing       Goal Outcome Evaluation:      Plan of Care Reviewed With: patient    Overall Patient Progress: improvingOverall Patient Progress: improving     Patient  alert  and  oriented  X  4.  Pleasant  and  cooperative  with  cares.  Vital  signs  were  stable  on  Room   air.  Denied  pains.  Her  wound  VAC  dressing  to  her  right  groin  is  patent  and  intact  at  125 mm Hg  pressure  with  minimal  drainage.  Her  BG  level  before  supper  was  222 mg/dL  with  Novolog  Insulin  2  Units  given  for  correction  dose.  At  bedtime,  her  BG  level  was  299 with  Novolog  Insulin  2  Units  given  as  well.  She  also  received  her  scheduled  dose  of  Lantus  Insulin  30  Units  at  this  time.  Her  Landrum  output  was  450  ml,  clear  yellow / gail and  BM  was  soft brownish,  medium in  amount  this  evening.  Will  keep  monitoring  patient's  progress  and  safety.

## 2025-05-06 NOTE — PROGRESS NOTES
[facility] Nurse Inpatient Assessment     Consulted for: perineal    Summary: Wound is significantly improved.  Will continue VAC at this time but plan for different dressing on discharge.    [facility] nurse follow-up plan: weekly    Patient History (according to provider note(s):      Marly Cannon is a 59 year old woman with history of intellectual disability & memory issues, COPD*on supplemental oxygen, untreated ADEEL, tobacco use d/o, HTN, pAFib, & poorly controlled T2DM w/ recent hospitalization for DKA (3/18 - 3/20), who presented to the hospital in [facility] on 3/22/2025 with complaints of dyspnea, hyperglycemia, and chest pain. She was found to have an YADIRA, LLL PE, DKA, and A-fib with RVR. She was started on an insulin drip, therapeutic dose enoxaparin, Zosyn for possible pneumonia, and diltiazem drip for management of her RVR. Her DKA has resolved, she was switched from diltiazem to amiodarone drip for management of A-fib with RVR; however, her degree of respiratory failure was still concerning and she was intubated 3/23 for management of worsening hypoxia. She had a repeat CTA that demonstrated progression of her PE and RLL collapse concerning for mucous plugging and pneumonia. She was switched from enoxaparin to heparin drip and her antibiotics were broadened to vancomycin and Zosyn. She was on norepinephrine for several days d/t hypotension attributed to sedation & sepsis-related vasoplegia. It appears that she has been doing fairly long SBTs since 3/26, which seemed to be going reasonably well per RT documentation. Primary team has been having issues w/ progressively increasing FiO2 needs. She had a sputum culture grow Candida & has been on fluconazole since 3/25. She has remained on the insulin drip, presumably to manage the persistent hyperglycemia during her steroid burst (for the presumed COPD exacerbation). She has received a fairly large volume of documented fluids, & was  started on diuresis 3/30. She was transferred to [facility] d/t concerns that she will require a tracheostomy secondary to her inability to liberate from the vent.     Preoperative Diagnosis: Perineal and right groin infection     Postoperative Diagnosis: Perineal and right groin necrotizing soft tissue infection     Procedure: Incision and drainage of perineum and right groin     Findings: Infection traveling up the right labia into the right groin.  Final debridement included a right groin defect measuring 18 x 8 x 5 cm and a perineal wound measuring 6 x 2 x 1 cm.       Assessment:      Wound location: Right groin       4/7/25                                                         4/15/25                                                      4/22/25        4/29       4/29 right groin      5/6    Wound due to: Surgical Wound   Wound history/plan of care:    Surgical date: 4/2 and 4/8   Service following: General Surgery  Date Negative Pressure Wound Therapy initiated: 04/09/25   Interventions in place: moisture/incontinence management  Is patient s nutritional status compromised? no   If yes, what interventions are in place? N/A  Reason for initiating vac therapy? Presence of co-morbidities, High risk of infections, and Need for accelerated granulation tissue  Which?of?the?following?co-morbidities?apply? Diabetes, Immobility, and Obesity  If diabetic is patient on a diabetic management program? Yes   Is osteomyelitis present in wound? no   If yes what treatments are in place? N/A  Wound due to: Surgical Wound  Wound history/plan of care: I&D 4/2/25 with second debridement on 4/8/25  Wound base: see photo - mix of adipose and granular tissue     Palpation of the wound bed: normal      Drainage:  scant     Description of drainage: serosanguinous     Measurements (length x width x depth, in cm): R groin wound measuring 3 cm x 14 cm x 0.2cm 4 o'clock area of depth 3cm  Labia area now shallow, see photo above    "  Undermining: NA  Periwound skin: Intact      Color: normal and consistent with surrounding tissue      Temperature: normal   Odor: none  Pain: mild   Pain interventions prior to dressing change: slow and gentle cares  and distraction, pain meds, soaking dressing  Treatment goal: Drainage control and Infection control/prevention  STATUS: improving  Supplies ordered: supplies stored on unit, discussed with RN, and discussed with patient      Number of foam pieces removed from a wound (excluding foam for bridge) :1 piece black foam  Verified this matched the number of foam pieces applied last dressing change: Yes  Number of foam pieces packed into wound (excluding foam for bridge) : 1 piece black foam    Labia healed    Treatment Plan:   Negative pressure wound therapy plan:  Wound location: Right groin   Change Days: Tues/ Fri   Supplies (including all accessories) used: small Black foam   Cleanse with Vashe prior to replacing NPWT  Suction setting: -125   Methods used: Window paned all periwound skin with vac drape prior to applying sponge and pack into tunnel with black foam   PRIOR TO REMOVAL: soak dressing, turn off VAC when giving pain meds, use adhesive remover on tape    Staff RN to assess integrity of dressing and ensure suction is set at appropriate level every shift.   Date canister. Chart canister output every shift. Change cannister weekly and PRN if full/occluded     Remove foam dressing and replace with BID normal saline moist gauze dressing if:   -a dressing failure which cannot be repaired within 2 hours   -patient is discharging to home without a home pump   -patient is discharging to a facility outside the local area   -if a dressing is a \"Silver Foam\", remove before Radiation Therapy or MRI     The hospital VAC pump is not to be discharged with the patient. Please disconnect the patient from the machine prior to discharge.  If a home VAC pump has been delivered, connect the home cannister to " dressing tubing and the cannister to the home pump, turn on home pump  If the patient is transferring to a nearby facility with a VAC, the tubing can be disconnected, clamp tubing and cover the end with a glove, then can be reconnected if within 2 hours  If transfer will be longer than 2 hours, dressing must be removed and placed with a wet to moist gauze dressing for transfer         Internal FMS - OK to remove if less than 200 mL output in a 24 hour period    Bilateral buttocks wounds: Every 3 days   Cleanse the area with wound cleanser and pat dry.  Apply No sting film barrier to periwound skin.  Cover wound with Sacral Mepilex (#[id number])  Change dressing Q 3 days.  Turn and reposition Q 2hrs side to side only.  Ensure pt has Pablo-cushion while sitting up in the chair.  If not in ICU: Ensure air pump on bed and set to Isoflex.  FYI- If pt has constant incontinent loose stools needing dressing changes Q shift please discontinue the Mepilex dressing and apply criticaid barrier paste BID and PRN.    Orders: Reviewed and Updated    RECOMMEND PRIMARY TEAM ORDER: None, at this time  Education provided: plan of care, wound progress, and Moisture management  Discussed plan of care with: Patient and Nurse; call out to surgeon's office for penrose drain removal - at LTACH versus Surgeon's office  Notify WOC if wound(s) deteriorate.  Nursing to notify the Provider(s) and re-consult the WOC Nurse if new skin concern.    DATA:     Current support surface: Standard  Standard gel mattress (Isoflex)  Containment of urine/stool: Incontinence Protocol and Indwelling catheter  BMI: Body mass index is 30.45 kg/m .   Active diet order: Orders Placed This Encounter      Combination Diet Moderate Consistent Carb (60 g CHO per Meal) Diet; Easy to Chew (level 7); Thin Liquids (level 0)     Output: I/O last 3 completed shifts:  In: 917 [P.O.:917]  Out: 1450 [Urine:1450]     Labs:   Recent Labs   Lab 05/05/25  0654   ALBUMIN 3.4*   HGB 11.8    WBC 8.2     Pressure injury risk assessment:   Sensory Perception: 3-->slightly limited  Moisture: 3-->occasionally moist  Activity: 1-->bedfast  Mobility: 3-->slightly limited  Nutrition: 2-->probably inadequate  Friction and Shear: 2-->potential problem  Vicente Score: 14    SHIRA FerrerN, RN, PHN, HNB-BC, CWOCN  Pager no longer is use, please contact through [facility]: [facility]

## 2025-05-06 NOTE — PROGRESS NOTES
Care Management Follow Up    Length of Stay (days): 25    Expected Discharge Date: 05/13/2025     Concerns to be Addressed:     patient needs ongoing wound cares; needs TCU placement in/near [place], [place]  Patient plan of care discussed at interdisciplinary rounds: Yes    Anticipated Discharge Disposition:  TCU for wound cares.        Anticipated Discharge Services:  TCU  Anticipated Discharge DME:  wound care supplies    Patient/family educated on Medicare website which has current facility and service quality ratings:  yes.  Education Provided on the Discharge Plan:  yes  Patient/Family in Agreement with the Plan:  yes    Referrals Placed by CM/SW:  Called two facilities in/near [place] today.    1) [facility] - left a message at [phone]    2) [facility] in [place], [place] (about 5 miles from [place]). Spoke with Kostas in admissions - her number is [phone] and discussed with her about possibility of wound vac dressing changes; her staff is not able to do wound vacs, but willing to do daily dressing changes.  Emailed patient's face sheet, MD progress note, WOC note and Pulm NP note to [email or web address]   Kostas said that they have several rooms available; she will look over referral and get back to us.        Private pay costs discussed: Not applicable    Discussed  Partnership in Safe Discharge Planning  document with patient/family: Yes: Discussed with patient during initial care management assessment.      Handoff Completed: No, handoff not indicated or clinically appropriate    Additional Information:  Spoke with patient and WOC nurse about potential discharge to TCU in [place].  WOC nurse said the wound is healing well but she will keep wound vac on until we have a confirmed discharge plan; then she will write orders for daily cares.    Next Steps: follow up with [facility] [place], [place], [phone]        Therese Woodson, RN, BSN  Care  Coordination  [facility]  [phone]

## 2025-05-07 ENCOUNTER — APPOINTMENT (OUTPATIENT)
Dept: PHYSICAL THERAPY | Facility: CLINIC | Age: 60
DRG: 602 | End: 2025-05-07
Attending: INTERNAL MEDICINE
Payer: COMMERCIAL

## 2025-05-07 ENCOUNTER — APPOINTMENT (OUTPATIENT)
Dept: OCCUPATIONAL THERAPY | Facility: CLINIC | Age: 60
DRG: 602 | End: 2025-05-07
Attending: INTERNAL MEDICINE
Payer: COMMERCIAL

## 2025-05-07 LAB
GLUCOSE BLDC GLUCOMTR-MCNC: 146 MG/DL (ref 70–99)
GLUCOSE BLDC GLUCOMTR-MCNC: 191 MG/DL (ref 70–99)
GLUCOSE BLDC GLUCOMTR-MCNC: 261 MG/DL (ref 70–99)
GLUCOSE BLDC GLUCOMTR-MCNC: 280 MG/DL (ref 70–99)
GLUCOSE BLDC GLUCOMTR-MCNC: 281 MG/DL (ref 70–99)

## 2025-05-07 PROCEDURE — 97116 GAIT TRAINING THERAPY: CPT | Mod: GP

## 2025-05-07 PROCEDURE — 250N000013 HC RX MED GY IP 250 OP 250 PS 637: Performed by: STUDENT IN AN ORGANIZED HEALTH CARE EDUCATION/TRAINING PROGRAM

## 2025-05-07 PROCEDURE — 999N000123 HC STATISTIC OXYGEN O2DAILY TECH TIME

## 2025-05-07 PROCEDURE — 999N000157 HC STATISTIC RCP TIME EA 10 MIN

## 2025-05-07 PROCEDURE — 250N000013 HC RX MED GY IP 250 OP 250 PS 637: Performed by: INTERNAL MEDICINE

## 2025-05-07 PROCEDURE — 99233 SBSQ HOSP IP/OBS HIGH 50: CPT | Performed by: HOSPITALIST

## 2025-05-07 PROCEDURE — 97110 THERAPEUTIC EXERCISES: CPT | Mod: GP

## 2025-05-07 PROCEDURE — 97535 SELF CARE MNGMENT TRAINING: CPT | Mod: GO | Performed by: OCCUPATIONAL THERAPIST

## 2025-05-07 PROCEDURE — 120N000017 HC R&B RESPIRATORY CARE

## 2025-05-07 RX ORDER — TRIAMCINOLONE ACETONIDE 1 MG/G
CREAM TOPICAL 2 TIMES DAILY
Status: DISCONTINUED | OUTPATIENT
Start: 2025-05-07 | End: 2025-05-09 | Stop reason: HOSPADM

## 2025-05-07 RX ORDER — TRIAMCINOLONE ACETONIDE 5 MG/G
CREAM TOPICAL 2 TIMES DAILY
Status: DISCONTINUED | OUTPATIENT
Start: 2025-05-07 | End: 2025-05-07

## 2025-05-07 RX ADMIN — INSULIN ASPART 3 UNITS: 100 INJECTION, SOLUTION INTRAVENOUS; SUBCUTANEOUS at 18:09

## 2025-05-07 RX ADMIN — UMECLIDINIUM BROMIDE AND VILANTEROL TRIFENATATE 1 PUFF: 62.5; 25 POWDER RESPIRATORY (INHALATION) at 09:02

## 2025-05-07 RX ADMIN — ATORVASTATIN CALCIUM 40 MG: 40 TABLET, FILM COATED ORAL at 21:22

## 2025-05-07 RX ADMIN — Medication 1 TABLET: at 09:01

## 2025-05-07 RX ADMIN — APIXABAN 5 MG: 5 TABLET, FILM COATED ORAL at 21:23

## 2025-05-07 RX ADMIN — ANORECTAL OINTMENT: 15.7; .44; 24; 20.6 OINTMENT TOPICAL at 14:57

## 2025-05-07 RX ADMIN — INSULIN ASPART 2 UNITS: 100 INJECTION, SOLUTION INTRAVENOUS; SUBCUTANEOUS at 09:02

## 2025-05-07 RX ADMIN — ANORECTAL OINTMENT: 15.7; .44; 24; 20.6 OINTMENT TOPICAL at 09:03

## 2025-05-07 RX ADMIN — AMLODIPINE BESYLATE 5 MG: 5 TABLET ORAL at 09:01

## 2025-05-07 RX ADMIN — ANORECTAL OINTMENT: 15.7; .44; 24; 20.6 OINTMENT TOPICAL at 21:23

## 2025-05-07 RX ADMIN — APIXABAN 5 MG: 5 TABLET, FILM COATED ORAL at 09:01

## 2025-05-07 RX ADMIN — TRIAMCINOLONE ACETONIDE: 1 CREAM TOPICAL at 21:28

## 2025-05-07 RX ADMIN — OXYCODONE HYDROCHLORIDE 5 MG: 5 TABLET ORAL at 14:56

## 2025-05-07 RX ADMIN — INSULIN ASPART 3 UNITS: 100 INJECTION, SOLUTION INTRAVENOUS; SUBCUTANEOUS at 12:37

## 2025-05-07 ASSESSMENT — ACTIVITIES OF DAILY LIVING (ADL)
ADLS_ACUITY_SCORE: 58
ADLS_ACUITY_SCORE: 62
ADLS_ACUITY_SCORE: 58
ADLS_ACUITY_SCORE: 58
ADLS_ACUITY_SCORE: 62
ADLS_ACUITY_SCORE: 58
ADLS_ACUITY_SCORE: 62
ADLS_ACUITY_SCORE: 58
ADLS_ACUITY_SCORE: 62
ADLS_ACUITY_SCORE: 58

## 2025-05-07 NOTE — PLAN OF CARE
Problem: Adult Inpatient Plan of Care  Goal: Plan of Care Review  Description: The Plan of Care Review/Shift note should be completed every shift.  The Outcome Evaluation is a brief statement about your assessment that the patient is improving, declining, or no change.  This information will be displayed automatically on your shiftnote.  Outcome: Progressing  Flowsheets (Taken 5/7/2025 1626)  Plan of Care Reviewed With: patient  Goal: Absence of Hospital-Acquired Illness or Injury  Intervention: Identify and Manage Fall Risk  Recent Flowsheet Documentation  Taken 5/7/2025 1556 by Shannon Swanson RN  Safety Promotion/Fall Prevention: activity supervised  Taken 5/7/2025 1100 by Shannon Swanson RN  Safety Promotion/Fall Prevention: activity supervised  Goal: Optimal Comfort and Wellbeing  Intervention: Monitor Pain and Promote Comfort  Recent Flowsheet Documentation  Taken 5/7/2025 1456 by Shannon Swanson RN  Pain Management Interventions: medication (see MAR)     Problem: Pain Acute  Goal: Optimal Pain Control and Function  Outcome: Progressing  Intervention: Develop Pain Management Plan  Recent Flowsheet Documentation  Taken 5/7/2025 1456 by Shannon Swanson RN  Pain Management Interventions: medication (see MAR)     Problem: Wound  Goal: Optimal Coping  Outcome: Progressing  Goal: Optimal Functional Ability  Intervention: Optimize Functional Ability  Recent Flowsheet Documentation  Taken 5/7/2025 1556 by Shannon Swanson RN  Assistive Device Utilized:   gait belt   walker  Activity Management: activity adjusted per tolerance  Activity Assistance Provided: assistance, 1 person  Taken 5/7/2025 1100 by Shannon Swanson RN  Assistive Device Utilized:   gait belt   walker  Activity Management: activity adjusted per tolerance  Activity Assistance Provided: assistance, 1 person  Goal: Optimal Pain Control and Function  Intervention: Prevent or Manage Pain  Recent Flowsheet Documentation  Taken 5/7/2025 1456 by Sky  Shannon, RN  Pain Management Interventions: medication (see MAR)  Goal: Skin Health and Integrity  Intervention: Optimize Skin Protection  Recent Flowsheet Documentation  Taken 5/7/2025 1556 by Shannon Swanson, RN  Activity Management: activity adjusted per tolerance  Taken 5/7/2025 1100 by Shannon Swanson, RN  Activity Management: activity adjusted per tolerance   Goal Outcome Evaluation:      Plan of Care Reviewed With: patient

## 2025-05-07 NOTE — PLAN OF CARE
Problem: Adult Inpatient Plan of Care  Goal: Optimal Comfort and Wellbeing  Outcome: Progressing   Patient seems to have slept well and comfortably all this noc shift, oxy mask at 3 liters. No concerns at this time

## 2025-05-07 NOTE — PROGRESS NOTES
[facility]    Medicine Progress Note - Hospitalist Service    Date of Admission:  4/11/2025  Brief Course Prior to [facility]:  59 up F with h/o intellectual disability & memory issues, COPD, untreated ADEEL, tobacco use d/o, HTN, pAFib, & poorly controlled T2DM s/p previous hospitalization for DKA (3/18 - 3/20) who presented second time to hospital in [place] on 3/22/2025 with complaints of dyspnea, hyperglycemia, and chest pain. She was found to have an YADIRA, LLL PE, DKA, and A-fib with RVR. She was started on an insulin drip, therapeutic dose enoxaparin, Zosyn for possible pneumonia, and diltiazem drip for management of her RVR. Her DKA resolved, she was switched from diltiazem to amiodarone drip for management of A-fib with RVR; however, her degree of respiratory failure was still concerning and she was intubated 3/23 for management of worsening hypoxia.   She had a repeat CTA that demonstrated progression of her PE and RLL collapse concerning for mucous plugging and pneumonia.   She was switched from enoxaparin to heparin drip and her antibiotics were broadened to vancomycin and Zosyn. She was on norepinephrine for several days d/t hypotension attributed to sedation & sepsis-related vasoplegia. It appears that she has been doing fairly long SBTs since 3/26, which seemed to be going reasonably well per RT documentation. Primary team has been having issues w/ progressively increasing FiO2 needs. She had a sputum culture grow Candida & has been on fluconazole since 3/25. Transferred to [facility] d/t concerns that she might require a tracheostomy secondary to her inability to liberate from the vent.   At [facility] patient was admitted to the ICU on a ventilator, successfully extubated 4/1 and downgraded to floor status 4/3.  Infectious disease consulted and continued patient on Zosyn course to be completed through 4/17/2025.  General surgery consulted for right keiry/perineal abscess and performed incision and  drainage with Penrose drain placement 3/31 with subsequent repeat I&D 4/2 and 4/8.  Surgery requests wound VAC be changed by wound care Tuesdays and Thursdays.     LTACH Course:  She transferred from [facility] to [place] for wound cares, therapies, and complex medical care.   4/12-4/14: Needing BiPAP overnight.  4/15-4/20: successfully weaned off NC O2 during the day, completed zosyn 4/17 4/21-4/27- wound is closing well - [facility] plan to remove penrose drain on 4/29/2025. Consider transfer to [facility] TCU for ongoing therapy and wound care.  4/28-5/4:  New information regarding placement needs surfaced early in the week, likely needing group home. Wound vac remains--penrose drain removed--packing to tract along/under R labia. Care conference completed 4/30.    5/5:  slept wih oxymask at 2L overnight.    5/6: Glucose is still elevated will increase glargine to 35 units  5/7:  no overnight events, oxy mask 3L overnight    BARRIERS TO DISCHARGE (why care is unable to be provided at a lower level of care):    Large right groin wound requiring frequent cares, wound vac, nocturnal BIPAP     Things to follow up on:  -unclear what will be best outpatient diabetes regimen as outpatient due to intellectual disability    Assessment & Plan   Acute on Chronic Hypoxic Respiratory Failure  VAP, PTA - resolved  COPD  ADEEL  Transferred from [place] for concerns of tracheostomy need, was able to liberate from ventilator withOUT tracheostomy formation. Weaned to minimal supplemental oxygen support (2-3L nasal).  - Completed zosyn 4/17  - inhalers as needed  -anoro ellipta daily  - RT following     Perineal Abscess  Condyloma Acuminata  s/p acyclovir and I&D x3  - zosyn completed 4/17  - rectal tube discontinued  - noguera for wound protection  - VAC in place  - Continue wound care per WOC nurse     PE, Acute, segmental, subsegmental  -apixaban 5mg PO BID    Seborrheic dermatitis:  -triamcinolone cream bid to face       T2DM  Poorly  controlled as outpatient with A1c 12.7. Used to be on insulin pump. No longer on it due to intellectual delay.  - BG labile earlier during admission  - Lantus 30u subcutaneous qAM--increased lantus to 35 unit(s) on 5/6  - Discontinue I:C of 1:5 -> I:C 1:4 with meals- as pt doesn't understand   -add novolog 7 unit(s) tid w/ meals  -sliding scale insulin      CKD3b  - Creatinine stable. Monitor     Elevated Hgb in past  Historically high  Consistent with smoking (high CO), untreated ADEEL     History of intellectual delay: supportive care.    PROBLEMS MANAGED During this or previous Encounter Now Stable but Monitoring:    Reactive thrombocytosis:  Platelets historically in normal range (reviewed last 3 years)  Peaked at 700,000, now resolved  Likely  reactive - reviewed flow chart for acute thrombocytosis, posted in note  Normal peripheral smear, liver enzymes           Diet: Snacks/Supplements Adult: Expedite Cup; With Meals  Snacks/Supplements Adult: Magic Cup; With Meals  Combination Diet Moderate Consistent Carb (60 g CHO per Meal) Diet; Easy to Chew (level 7); Thin Liquids (level 0)  Snacks/Supplements Adult: Gelatein 20 (sugar-free); With Meals    DVT Prophylaxis: DOAC  Landrum Catheter: PRESENT, indication: Wound deterioration and failed external collection device  Lines: None     Cardiac Monitoring: None  Code Status: Full Code      Clinically Significant Risk Factors               # Hypoalbuminemia: Lowest albumin = 2.9 g/dL at 4/13/2025  2:14 AM, will monitor as appropriate     # Hypertension: Noted on problem list           # DMII: A1C = 12.7 % (Ref range: <5.7 %) within past 6 months       # COPD: noted on problem list        Social Drivers of Health    Tobacco Use: Medium Risk (4/2/2025)    Patient History     Smoking Tobacco Use: Former     Smokeless Tobacco Use: Never     Passive Exposure: Current   Physical Activity: Unknown (5/16/2024)    Exercise Vital Sign     Days of Exercise per Week: 0 days   Social  Connections: Unknown (5/16/2024)    Social Connection and Isolation Panel [NHANES]     Frequency of Social Gatherings with Friends and Family: More than three times a week          Disposition Plan     Medically Ready for Discharge: Anticipated in 5+ Days         Karolina Miramontes MD  Hospitalist Service  LTACH  Securely message with [employer] (more info)  Text page via [employer] Paging/Directory   ______________________________________________________________________    Interval History   NO overnight events.   No dyspnea  No chest pain   +abdominal pain   No further  nausea       Physical Exam   Vital Signs: Temp: 98.9  F (37.2  C) Temp src: Oral BP: 111/65 Pulse: 89   Resp: 16 SpO2: 92 % O2 Device: Nasal cannula Oxygen Delivery: 2 LPM  Weight: 183 lbs 0 oz    General Appearance:  NAD  Respiratory:  rhonchi bilaterally  Cardiovascular: RRR S1S2  GI: soft, NT  Skin: .  wide-spread seborrheic dermatitis.  Large right groin wound - see WOC note - dressing in place  Neuro: Alert, generally nonfocal on motor and sensory testing      Medical Decision Making       52 MINUTES SPENT BY ME on the date of service doing chart review, history, exam, documentation & further activities per the note.      Data         Imaging results reviewed over the past 24 hrs:   No results found for this or any previous visit (from the past 24 hours).

## 2025-05-08 ENCOUNTER — APPOINTMENT (OUTPATIENT)
Dept: PHYSICAL THERAPY | Facility: CLINIC | Age: 60
DRG: 602 | End: 2025-05-08
Attending: INTERNAL MEDICINE
Payer: COMMERCIAL

## 2025-05-08 ENCOUNTER — APPOINTMENT (OUTPATIENT)
Dept: OCCUPATIONAL THERAPY | Facility: CLINIC | Age: 60
DRG: 602 | End: 2025-05-08
Attending: INTERNAL MEDICINE
Payer: COMMERCIAL

## 2025-05-08 ENCOUNTER — ANCILLARY PROCEDURE (OUTPATIENT)
Dept: GENERAL RADIOLOGY | Facility: CLINIC | Age: 60
DRG: 602 | End: 2025-05-08
Attending: HOSPITALIST
Payer: COMMERCIAL

## 2025-05-08 VITALS
SYSTOLIC BLOOD PRESSURE: 126 MMHG | DIASTOLIC BLOOD PRESSURE: 71 MMHG | RESPIRATION RATE: 18 BRPM | TEMPERATURE: 98.5 F | BODY MASS INDEX: 32.07 KG/M2 | WEIGHT: 192.7 LBS | OXYGEN SATURATION: 95 % | HEART RATE: 88 BPM

## 2025-05-08 LAB
CREAT SERPL-MCNC: 1.3 MG/DL (ref 0.51–0.95)
EGFRCR SERPLBLD CKD-EPI 2021: 47 ML/MIN/1.73M2
GLUCOSE BLDC GLUCOMTR-MCNC: 232 MG/DL (ref 70–99)
GLUCOSE BLDC GLUCOMTR-MCNC: 245 MG/DL (ref 70–99)
GLUCOSE BLDC GLUCOMTR-MCNC: 270 MG/DL (ref 70–99)
GLUCOSE BLDC GLUCOMTR-MCNC: 279 MG/DL (ref 70–99)
PLATELET # BLD AUTO: 334 10E3/UL (ref 150–450)

## 2025-05-08 PROCEDURE — 250N000013 HC RX MED GY IP 250 OP 250 PS 637: Performed by: INTERNAL MEDICINE

## 2025-05-08 PROCEDURE — 85049 AUTOMATED PLATELET COUNT: CPT | Performed by: STUDENT IN AN ORGANIZED HEALTH CARE EDUCATION/TRAINING PROGRAM

## 2025-05-08 PROCEDURE — 250N000012 HC RX MED GY IP 250 OP 636 PS 637: Performed by: HOSPITALIST

## 2025-05-08 PROCEDURE — 97530 THERAPEUTIC ACTIVITIES: CPT | Mod: GP

## 2025-05-08 PROCEDURE — 97129 THER IVNTJ 1ST 15 MIN: CPT | Mod: GO | Performed by: OCCUPATIONAL THERAPIST

## 2025-05-08 PROCEDURE — 999N000157 HC STATISTIC RCP TIME EA 10 MIN

## 2025-05-08 PROCEDURE — 99233 SBSQ HOSP IP/OBS HIGH 50: CPT | Performed by: HOSPITALIST

## 2025-05-08 PROCEDURE — 120N000017 HC R&B RESPIRATORY CARE

## 2025-05-08 PROCEDURE — 36415 COLL VENOUS BLD VENIPUNCTURE: CPT | Performed by: STUDENT IN AN ORGANIZED HEALTH CARE EDUCATION/TRAINING PROGRAM

## 2025-05-08 PROCEDURE — 82565 ASSAY OF CREATININE: CPT | Performed by: STUDENT IN AN ORGANIZED HEALTH CARE EDUCATION/TRAINING PROGRAM

## 2025-05-08 PROCEDURE — 250N000013 HC RX MED GY IP 250 OP 250 PS 637: Performed by: STUDENT IN AN ORGANIZED HEALTH CARE EDUCATION/TRAINING PROGRAM

## 2025-05-08 PROCEDURE — 97130 THER IVNTJ EA ADDL 15 MIN: CPT | Mod: GO | Performed by: OCCUPATIONAL THERAPIST

## 2025-05-08 PROCEDURE — 74018 RADEX ABDOMEN 1 VIEW: CPT

## 2025-05-08 PROCEDURE — 97116 GAIT TRAINING THERAPY: CPT | Mod: GP

## 2025-05-08 RX ADMIN — APIXABAN 5 MG: 5 TABLET, FILM COATED ORAL at 21:13

## 2025-05-08 RX ADMIN — INSULIN ASPART 3 UNITS: 100 INJECTION, SOLUTION INTRAVENOUS; SUBCUTANEOUS at 17:36

## 2025-05-08 RX ADMIN — UMECLIDINIUM BROMIDE AND VILANTEROL TRIFENATATE 1 PUFF: 62.5; 25 POWDER RESPIRATORY (INHALATION) at 09:05

## 2025-05-08 RX ADMIN — ATORVASTATIN CALCIUM 40 MG: 40 TABLET, FILM COATED ORAL at 21:13

## 2025-05-08 RX ADMIN — ANORECTAL OINTMENT: 15.7; .44; 24; 20.6 OINTMENT TOPICAL at 21:16

## 2025-05-08 RX ADMIN — INSULIN ASPART 3 UNITS: 100 INJECTION, SOLUTION INTRAVENOUS; SUBCUTANEOUS at 11:56

## 2025-05-08 RX ADMIN — AMLODIPINE BESYLATE 5 MG: 5 TABLET ORAL at 09:05

## 2025-05-08 RX ADMIN — INSULIN ASPART 2 UNITS: 100 INJECTION, SOLUTION INTRAVENOUS; SUBCUTANEOUS at 08:20

## 2025-05-08 RX ADMIN — Medication 1 TABLET: at 09:05

## 2025-05-08 RX ADMIN — INSULIN GLARGINE 38 UNITS: 100 INJECTION, SOLUTION SUBCUTANEOUS at 21:12

## 2025-05-08 RX ADMIN — ANORECTAL OINTMENT: 15.7; .44; 24; 20.6 OINTMENT TOPICAL at 09:05

## 2025-05-08 RX ADMIN — APIXABAN 5 MG: 5 TABLET, FILM COATED ORAL at 09:05

## 2025-05-08 RX ADMIN — TRIAMCINOLONE ACETONIDE: 1 CREAM TOPICAL at 09:05

## 2025-05-08 RX ADMIN — TRIAMCINOLONE ACETONIDE: 1 CREAM TOPICAL at 21:16

## 2025-05-08 ASSESSMENT — ACTIVITIES OF DAILY LIVING (ADL)
ADLS_ACUITY_SCORE: 58

## 2025-05-08 NOTE — PROGRESS NOTES
05/07/25 1200   Appointment Info   Signing Clinician's Name / Credentials (PT) Arik Suero PT   Rehab Comments (PT) RA   Therapeutic Procedure/Exercise   Ther. Procedure: strength, endurance, ROM, flexibillity Minutes (59179) 12   Symptoms Noted During/After Treatment fatigue   Treatment Detail/Skilled Intervention Pt. actively on Nu Step for 8 minutes (2 minutes x4 bouts) on level 2 with an avg og 45 steps/min to improve activity tolerance   Therapeutic Activity   Therapeutic Activities: dynamic activities to improve functional performance Minutes (74415) 3   Symptoms Noted During/After Treatment None   Treatment Detail/Skilled Intervention Sit to stand and stand pivot transfers with FWW SBA   Gait Training   Gait Training Minutes (77728) 15   Symptoms Noted During/After Treatment (Gait Training) fatigue   Treatment Detail/Skilled Intervention Initial bout of amb with FWW SBA and w/c follow.  Pt. with decreased step length and slow pace.  2nd bout pt. amb with HHA (min) on L, pt. with WBOS, decreased step length, and slow pace.   Distance in Feet 200, 45   Carter Level (Gait Training) stand-by assist   Physical Assistance Level (Gait Training) 1 person assist   Weight Bearing (Gait Training) full weight-bearing   Assistive Device (Gait Training) rolling walker;other (see comments)  (HHA (min))   Stair Railings present at both sides  (ascend/descend 4 stairs)   Physical Assist/Nonphysical Assist (Stairs) 1 person assist   Level of Carter (Stairs) contact guard   PT Discharge Planning   PT Plan Bed mobility, transfers, gait, stairs, NuStep   PT Discharge Recommendation (DC Rec) Transitional Care Facility;home with home care physical therapy   PT Rationale for DC Rec Pt requires assist for safe functional mobility.   PT Brief overview of current status Pt. participates well but remains limited by decreased activity tolerance   Physical Therapy Time and Intention   Timed Code Treatment Minutes 30    Total Session Time (sum of timed and untimed services) 30     This note was created for Utilization Review purposes only and the services rendered in this note are not a reflection of services provided by this author.

## 2025-05-08 NOTE — PLAN OF CARE
Problem: Adult Inpatient Plan of Care  Goal: Optimal Comfort and Wellbeing  Outcome: Progressing   Patient has had a very  restful night thus far, regular turning and cares provided, ensured her safety. Continue to  monitor

## 2025-05-08 NOTE — PROGRESS NOTES
05/07/25 1035   Appointment Info   Signing Clinician's Name / Credentials (OT) Jose Antonio Martines OTR/L   Self-Care/Home Management   Self-Care/Home Mgmt/ADL, Compensatory, Meal Prep Minutes (75917) 30   Symptoms Noted During/After Treatment (Meal Preparation/Planning Training) fatigue   Treatment Detail/Skilled Intervention Facilitated ADL tasks to porgress IND and activity tolerance. Ambulated to bathroom- hyg/grroming completed. dressing from recliner w/ reahcer use.   OT Discharge Planning   OT Plan 5/7: dressing including clothing retrieval, standing at sink for g/h, toileting in BR/toilet transfers, UB ex, bed mobility, sock aid in room for LB dressing   OT Discharge Recommendation (DC Rec) Transitional Care Facility   OT Rationale for DC Rec pt may require further therapy prior to dc to home.   Total Session Time   Timed Code Treatment Minutes 30   Total Session Time (sum of timed and untimed services) 30     This note was created for Utilization Review purposes only and the services rendered in this note are not a reflection of services provided by this author.

## 2025-05-08 NOTE — PROGRESS NOTES
[facility]    Medicine Progress Note - Hospitalist Service    Date of Admission:  4/11/2025  Brief Course Prior to [facility]:  59 up F with h/o intellectual disability & memory issues, COPD, untreated ADEEL, tobacco use d/o, HTN, pAFib, & poorly controlled T2DM s/p previous hospitalization for DKA (3/18 - 3/20) who presented second time to hospital in [place] on 3/22/2025 with complaints of dyspnea, hyperglycemia, and chest pain. She was found to have an YADIRA, LLL PE, DKA, and A-fib with RVR. She was started on an insulin drip, therapeutic dose enoxaparin, Zosyn for possible pneumonia, and diltiazem drip for management of her RVR. Her DKA resolved, she was switched from diltiazem to amiodarone drip for management of A-fib with RVR; however, her degree of respiratory failure was still concerning and she was intubated 3/23 for management of worsening hypoxia.   She had a repeat CTA that demonstrated progression of her PE and RLL collapse concerning for mucous plugging and pneumonia.   She was switched from enoxaparin to heparin drip and her antibiotics were broadened to vancomycin and Zosyn. She was on norepinephrine for several days d/t hypotension attributed to sedation & sepsis-related vasoplegia. It appears that she has been doing fairly long SBTs since 3/26, which seemed to be going reasonably well per RT documentation. Primary team has been having issues w/ progressively increasing FiO2 needs. She had a sputum culture grow Candida & has been on fluconazole since 3/25. Transferred to [facility] d/t concerns that she might require a tracheostomy secondary to her inability to liberate from the vent.   At [facility] patient was admitted to the ICU on a ventilator, successfully extubated 4/1 and downgraded to floor status 4/3.  Infectious disease consulted and continued patient on Zosyn course to be completed through 4/17/2025.  General surgery consulted for right keiry/perineal abscess and performed incision and  drainage with Penrose drain placement 3/31 with subsequent repeat I&D 4/2 and 4/8.  Surgery requests wound VAC be changed by wound care Tuesdays and Thursdays.     LTACH Course:  She transferred from [facility] to [place] for wound cares, therapies, and complex medical care.   4/12-4/14: Needing BiPAP overnight.  4/15-4/20: successfully weaned off NC O2 during the day, completed zosyn 4/17 4/21-4/27- wound is closing well - [facility] plan to remove penrose drain on 4/29/2025. Consider transfer to [facility] T[facility] for ongoing therapy and wound care.  4/28-5/4:  New information regarding placement needs surfaced early in the week, likely needing group home. Wound vac remains--penrose drain removed--packing to tract along/under R labia. Care conference completed 4/30.    5/5:  slept wih oxymask at 2L overnight.    5/6: Glucose is still elevated will increase glargine to 35 units  5/7:  no overnight events, oxy mask 3L overnight  5/8: increase novolog to 10 unit(s) at meals, increase lantus to 38 unit(s)   Abd xray: mild colonic stool burden, no obstruction or free air.  YADIRA- will get labs for am and consult nephrology     BARRIERS TO DISCHARGE (why care is unable to be provided at a lower level of care):    Large right groin wound requiring frequent cares, wound vac, nocturnal BIPAP     Things to follow up on:  -unclear what will be best outpatient diabetes regimen as outpatient due to intellectual disability    Assessment & Plan     YADIRA possibly due to ATN   -monitor creatinine   -nephrology consult   -f/up cystatin C levels    Acute on Chronic Hypoxic Respiratory Failure  VAP, PTA - resolved  COPD  ADEEL  Transferred from [place] for concerns of tracheostomy need, was able to liberate from ventilator withOUT tracheostomy formation. Weaned to minimal supplemental oxygen support (2-3L nasal).  - Completed zosyn 4/17  - inhalers as needed  -anoro ellipta daily  - RT following     Perineal Abscess  Condyloma Acuminata  s/p  acyclovir and I&D x3  - zosyn completed 4/17  - rectal tube discontinued  - noguera for wound protection  - VAC in place  - Continue wound care per C nurse     PE, Acute, segmental, subsegmental  -apixaban 5mg PO BID    Seborrheic dermatitis:  -triamcinolone cream bid to face       T2DM  Poorly controlled as outpatient with A1c 12.7. Used to be on insulin pump. No longer on it due to intellectual delay.  - BG labile earlier during admission  - Lantus 38 unit(s)  subcutaneous qAM--increased on 5/8  - Discontinue I:C of 1:5 -> I:C 1:4 with meals- as pt doesn't understand   -novolog 10 unit(s) tid w/ meals -increased on 5/8  -sliding scale insulin      CKD3b  - Creatinine stable. Monitor     Elevated Hgb in past  Historically high  Consistent with smoking (high CO), untreated ADEEL     History of intellectual delay: supportive care.    PROBLEMS MANAGED During this or previous Encounter Now Stable but Monitoring:    Reactive thrombocytosis:  Platelets historically in normal range (reviewed last 3 years)  Peaked at 700,000, now resolved  Likely  reactive - reviewed flow chart for acute thrombocytosis, posted in note  Normal peripheral smear, liver enzymes           Diet: Snacks/Supplements Adult: Expedite Cup; With Meals  Snacks/Supplements Adult: Magic Cup; With Meals  Combination Diet Moderate Consistent Carb (60 g CHO per Meal) Diet; Easy to Chew (level 7); Thin Liquids (level 0)  Snacks/Supplements Adult: Gelatein 20 (sugar-free); With Meals    DVT Prophylaxis: DOAC  Noguera Catheter: PRESENT, indication: Wound deterioration and failed external collection device  Lines: None     Cardiac Monitoring: None  Code Status: Full Code      Clinically Significant Risk Factors               # Hypoalbuminemia: Lowest albumin = 2.9 g/dL at 4/13/2025  2:14 AM, will monitor as appropriate     # Hypertension: Noted on problem list           # DMII: A1C = 12.7 % (Ref range: <5.7 %) within past 6 months       # COPD: noted on problem  list        Social Drivers of Health    Tobacco Use: Medium Risk (4/2/2025)    Patient History     Smoking Tobacco Use: Former     Smokeless Tobacco Use: Never     Passive Exposure: Current   Physical Activity: Unknown (5/16/2024)    Exercise Vital Sign     Days of Exercise per Week: 0 days   Social Connections: Unknown (5/16/2024)    Social Connection and Isolation Panel [NHANES]     Frequency of Social Gatherings with Friends and Family: More than three times a week          Disposition Plan     Medically Ready for Discharge: Anticipated in 5+ Days         Karolina Miramontes MD  Hospitalist Service  LTACH  Securely message with [employer] (more info)  Text page via [employer] Paging/Directory   ______________________________________________________________________    Interval History   NO overnight events.   No dyspnea  No chest pain    No further abdominal pain   No further  nausea       Physical Exam   Vital Signs: Temp: 98.9  F (37.2  C) Temp src: Oral BP: 127/69 Pulse: 87   Resp: 18 SpO2: 93 % O2 Device: None (Room air) Oxygen Delivery: 2.5 LPM  Weight: 192 lbs 11.2 oz    General Appearance:  NAD  Respiratory:  rhonchi bilaterally  Cardiovascular: RRR S1S2  GI: soft, NT  Skin: .  wide-spread seborrheic dermatitis.  Large right groin wound - see WOC note - dressing in place  Neuro: Alert, generally nonfocal on motor and sensory testing      Medical Decision Making       50 MINUTES SPENT BY ME on the date of service doing chart review, history, exam, documentation & further activities per the note.      Data     I have personally reviewed the following data over the past 24 hrs:    N/A  \   N/A   / 334     N/A N/A N/A /  270 (H)   N/A N/A 1.30 (H) \       Imaging results reviewed over the past 24 hrs:   Recent Results (from the past 24 hours)   XR Abdomen Port 1 View    Narrative    EXAM: XR ABDOMEN PORT 1 VIEW  LOCATION: [facility]  DATE: 5/8/2025    INDICATION: Left upper quadrant abdominal  pain  COMPARISON: 3/31/2025      Impression    IMPRESSION: Mild colonic stool burden. Bowel gas pattern is normal. Nothing for obstruction or free air. No evidence for renal stones.

## 2025-05-08 NOTE — PLAN OF CARE
Problem: Pain Acute  Goal: Optimal Pain Control and Function  Outcome: Progressing  Intervention: Optimize Psychosocial Wellbeing    Problem: Wound  Goal: Optimal Coping  Outcome: Progressing  Intervention: Support Patient and Family Response    Problem: Glycemic Control Impaired  Goal: Blood Glucose Level Within Targeted Range  Outcome: Not Progressing         Goal Outcome Evaluation:  Patient denied pain; she is eating % of her meals. Blood glucose has been in the 200's . Insulin doses were adjusted today; will monitor.  Wound vac intact and working well-drainage minimal and serosanguinous .

## 2025-05-08 NOTE — PROGRESS NOTES
Referrals for TCU placement:     Writer called and left message for Naomie in admissions @ [facility] [phone]. [phone] (main).      Writer called and left message for Kostas @ [facility] in [place], [place]. Kostas's number  [phone], also sent follow up email to her @  [email or web address]       ADD: spoke to Naomie @ [facility] and faxed her the referral.      Arik Jane, [facility]  [phone]

## 2025-05-08 NOTE — PLAN OF CARE
Problem: Adult Inpatient Plan of Care  Goal: Optimal Comfort and Wellbeing  Outcome: Progressing     Problem: Pain Acute  Goal: Optimal Pain Control and Function  Outcome: Progressing   Goal Outcome Evaluation:  Pt alert and oriented. C/o sharp pain that comes and go on the abd left upper quadrant; this writer offered intervention, but pt declined. Landrum patent with good output. Wound vac intact. Plan of care ongoing.

## 2025-05-09 ENCOUNTER — APPOINTMENT (OUTPATIENT)
Dept: OCCUPATIONAL THERAPY | Facility: CLINIC | Age: 60
DRG: 602 | End: 2025-05-09
Attending: INTERNAL MEDICINE
Payer: COMMERCIAL

## 2025-05-09 ENCOUNTER — APPOINTMENT (OUTPATIENT)
Dept: PHYSICAL THERAPY | Facility: CLINIC | Age: 60
DRG: 602 | End: 2025-05-09
Attending: INTERNAL MEDICINE
Payer: COMMERCIAL

## 2025-05-09 ENCOUNTER — LAB REQUISITION (OUTPATIENT)
Dept: LAB | Facility: CLINIC | Age: 60
End: 2025-05-09
Payer: COMMERCIAL

## 2025-05-09 VITALS
SYSTOLIC BLOOD PRESSURE: 122 MMHG | DIASTOLIC BLOOD PRESSURE: 67 MMHG | TEMPERATURE: 98.2 F | OXYGEN SATURATION: 91 % | HEART RATE: 85 BPM | WEIGHT: 192.7 LBS | BODY MASS INDEX: 32.07 KG/M2 | RESPIRATION RATE: 20 BRPM

## 2025-05-09 DIAGNOSIS — Z11.1 ENCOUNTER FOR SCREENING FOR RESPIRATORY TUBERCULOSIS: ICD-10-CM

## 2025-05-09 LAB
ANION GAP SERPL CALCULATED.3IONS-SCNC: 7 MMOL/L (ref 7–15)
BUN SERPL-MCNC: 47.4 MG/DL (ref 8–23)
CALCIUM SERPL-MCNC: 9.7 MG/DL (ref 8.8–10.4)
CHLORIDE SERPL-SCNC: 102 MMOL/L (ref 98–107)
CREAT SERPL-MCNC: 1.18 MG/DL (ref 0.51–0.95)
CYSTATIN C (ROCHE): 2.1 MG/L (ref 0.6–1)
EGFRCR SERPLBLD CKD-EPI 2021: 52 ML/MIN/1.73M2
ERYTHROCYTE [DISTWIDTH] IN BLOOD BY AUTOMATED COUNT: 13.8 % (ref 10–15)
GFR/BSA.PRED SERPLBLD CYS-BASED-ARV: 27 ML/MIN/1.73M2
GLUCOSE BLDC GLUCOMTR-MCNC: 136 MG/DL (ref 70–99)
GLUCOSE BLDC GLUCOMTR-MCNC: 208 MG/DL (ref 70–99)
GLUCOSE SERPL-MCNC: 132 MG/DL (ref 70–99)
HCO3 SERPL-SCNC: 31 MMOL/L (ref 22–29)
HCT VFR BLD AUTO: 35.4 % (ref 35–47)
HGB BLD-MCNC: 11.7 G/DL (ref 11.7–15.7)
MAGNESIUM SERPL-MCNC: 1.9 MG/DL (ref 1.7–2.3)
MCH RBC QN AUTO: 30.8 PG (ref 26.5–33)
MCHC RBC AUTO-ENTMCNC: 33.1 G/DL (ref 31.5–36.5)
MCV RBC AUTO: 93 FL (ref 78–100)
PHOSPHATE SERPL-MCNC: 4.1 MG/DL (ref 2.5–4.5)
PLATELET # BLD AUTO: 380 10E3/UL (ref 150–450)
POTASSIUM SERPL-SCNC: 4.5 MMOL/L (ref 3.4–5.3)
RBC # BLD AUTO: 3.8 10E6/UL (ref 3.8–5.2)
SODIUM SERPL-SCNC: 140 MMOL/L (ref 135–145)
WBC # BLD AUTO: 8.8 10E3/UL (ref 4–11)

## 2025-05-09 PROCEDURE — 84100 ASSAY OF PHOSPHORUS: CPT | Performed by: HOSPITALIST

## 2025-05-09 PROCEDURE — 99239 HOSP IP/OBS DSCHRG MGMT >30: CPT | Performed by: HOSPITALIST

## 2025-05-09 PROCEDURE — 99254 IP/OBS CNSLTJ NEW/EST MOD 60: CPT | Performed by: PHYSICIAN ASSISTANT

## 2025-05-09 PROCEDURE — 83735 ASSAY OF MAGNESIUM: CPT | Performed by: HOSPITALIST

## 2025-05-09 PROCEDURE — 97530 THERAPEUTIC ACTIVITIES: CPT | Mod: GO | Performed by: OCCUPATIONAL THERAPIST

## 2025-05-09 PROCEDURE — 82610 CYSTATIN C: CPT | Performed by: HOSPITALIST

## 2025-05-09 PROCEDURE — 250N000013 HC RX MED GY IP 250 OP 250 PS 637: Performed by: STUDENT IN AN ORGANIZED HEALTH CARE EDUCATION/TRAINING PROGRAM

## 2025-05-09 PROCEDURE — 36415 COLL VENOUS BLD VENIPUNCTURE: CPT | Performed by: HOSPITALIST

## 2025-05-09 PROCEDURE — 97116 GAIT TRAINING THERAPY: CPT | Mod: GP

## 2025-05-09 PROCEDURE — 97530 THERAPEUTIC ACTIVITIES: CPT | Mod: GP

## 2025-05-09 PROCEDURE — 250N000012 HC RX MED GY IP 250 OP 636 PS 637: Performed by: HOSPITALIST

## 2025-05-09 PROCEDURE — 80048 BASIC METABOLIC PNL TOTAL CA: CPT | Performed by: HOSPITALIST

## 2025-05-09 PROCEDURE — 97110 THERAPEUTIC EXERCISES: CPT | Mod: GP

## 2025-05-09 PROCEDURE — 250N000013 HC RX MED GY IP 250 OP 250 PS 637: Performed by: INTERNAL MEDICINE

## 2025-05-09 RX ORDER — ONDANSETRON 4 MG/1
4 TABLET, ORALLY DISINTEGRATING ORAL EVERY 6 HOURS PRN
Status: SHIPPED
Start: 2025-05-09

## 2025-05-09 RX ORDER — SENNOSIDES 8.6 MG
1 TABLET ORAL 2 TIMES DAILY PRN
Status: SHIPPED
Start: 2025-05-09

## 2025-05-09 RX ORDER — ALBUTEROL SULFATE 0.83 MG/ML
2.5 SOLUTION RESPIRATORY (INHALATION)
Status: SHIPPED
Start: 2025-05-09

## 2025-05-09 RX ORDER — TRIAMCINOLONE ACETONIDE 1 MG/G
CREAM TOPICAL 2 TIMES DAILY
Status: SHIPPED
Start: 2025-05-09 | End: 2025-05-12

## 2025-05-09 RX ORDER — UMECLIDINIUM BROMIDE AND VILANTEROL TRIFENATATE 62.5; 25 UG/1; UG/1
1 POWDER RESPIRATORY (INHALATION) DAILY
Status: SHIPPED
Start: 2025-05-10

## 2025-05-09 RX ORDER — OXYCODONE HYDROCHLORIDE 5 MG/1
5 TABLET ORAL EVERY 4 HOURS PRN
Status: SHIPPED
Start: 2025-05-09 | End: 2025-05-12

## 2025-05-09 RX ORDER — MULTIPLE VITAMINS W/ MINERALS TAB 9MG-400MCG
1 TAB ORAL DAILY
Status: SHIPPED
Start: 2025-05-10

## 2025-05-09 RX ORDER — ACETAMINOPHEN 325 MG/1
650 TABLET ORAL EVERY 6 HOURS PRN
Status: SHIPPED
Start: 2025-05-09

## 2025-05-09 RX ORDER — POLYETHYLENE GLYCOL 3350 17 G/17G
17 POWDER, FOR SOLUTION ORAL DAILY PRN
Status: SHIPPED
Start: 2025-05-09

## 2025-05-09 RX ADMIN — APIXABAN 5 MG: 5 TABLET, FILM COATED ORAL at 08:30

## 2025-05-09 RX ADMIN — TRIAMCINOLONE ACETONIDE: 1 CREAM TOPICAL at 08:30

## 2025-05-09 RX ADMIN — AMLODIPINE BESYLATE 5 MG: 5 TABLET ORAL at 08:30

## 2025-05-09 RX ADMIN — UMECLIDINIUM BROMIDE AND VILANTEROL TRIFENATATE 1 PUFF: 62.5; 25 POWDER RESPIRATORY (INHALATION) at 08:31

## 2025-05-09 RX ADMIN — Medication 1 TABLET: at 08:30

## 2025-05-09 RX ADMIN — OXYCODONE HYDROCHLORIDE 10 MG: 10 TABLET ORAL at 13:26

## 2025-05-09 RX ADMIN — INSULIN ASPART 2 UNITS: 100 INJECTION, SOLUTION INTRAVENOUS; SUBCUTANEOUS at 12:23

## 2025-05-09 RX ADMIN — ANORECTAL OINTMENT: 15.7; .44; 24; 20.6 OINTMENT TOPICAL at 08:30

## 2025-05-09 RX ADMIN — ANORECTAL OINTMENT: 15.7; .44; 24; 20.6 OINTMENT TOPICAL at 13:11

## 2025-05-09 ASSESSMENT — ACTIVITIES OF DAILY LIVING (ADL)
ADLS_ACUITY_SCORE: 58
ADLS_ACUITY_SCORE: 62
ADLS_ACUITY_SCORE: 62
ADLS_ACUITY_SCORE: 58
ADLS_ACUITY_SCORE: 62
ADLS_ACUITY_SCORE: 58

## 2025-05-09 NOTE — PROGRESS NOTES
"   05/08/25 1301   Appointment Info   Signing Clinician's Name / Credentials (OT) Jose Antonio Martines, OTR/L   Cognitive Screens/Assessments   Cognitive Assessments Completed ACE III   [place] s Cognitive Examination (ACE-III) Total Score (out of 100)  64/100   ACE III Norms A score of 82 or less indicates suspected cognitive impairment   ACE III Domains Assessed Cognitive Screen for Attention, Memory, Fluency, Language, Visuospatial Abilities   ACE III Interpretation Subtest scores; attention 8/18, memory 17/26, Fluency 3/14, language 22/26, visuospatial 14/16. Slightl improvement in areas of memory/lang and visuospatial.   Cognitive Retraining   Cognitive Skills Intervention 1st 15 Minutes Timed (70588) 15   Cognitive Skills Intervention Addl Minutes (71897) 10   Symptoms Noted During/After Treatment None   Treatment Detail/Skilled Intervention Completed reassess of ACE III, socre improved slightly. Cont to demo cog below\"norms\". Pt was alert, sitting upright in bed and cooperative during session.   OT Discharge Planning   OT Plan 5/7: dressing including clothing retrieval, standing at sink for g/h, toileting in BR/toilet transfers, UB ex, bed mobility, sock aid in room for LB dressing   OT Discharge Recommendation (DC Rec) Transitional Care Facility   OT Rationale for DC Rec pt may require further therapy prior to dc to home.   OT Brief overview of current status Pt making slow gradual gains w/ cognitive skills for daily tasks. Cont to assist w/ attention to details, cog provessing, recall and understanding of visuoaspatial information. Cont OT/POC   Total Session Time   Timed Code Treatment Minutes 25   Total Session Time (sum of timed and untimed services) 25       "

## 2025-05-09 NOTE — PROGRESS NOTES
Writer received call from patient's sign other-Tavo.  He was updated on discharge plan.     Arik Jane, [facility]  [phone]

## 2025-05-09 NOTE — PROGRESS NOTES
Met with patient once more to discuss discharge.   When asked, she prefers to call her sign other to inform of the discharge.     Arik Jane, [facility]  [phone]

## 2025-05-09 NOTE — PROGRESS NOTES
Writer has met patient 3x today to discuss discharge planning and insurance status.  During third meeting, writer discuss and gave patient a copy of her insurance denial letter.     Arik Jane, [facility]  [phone]

## 2025-05-09 NOTE — CONSULTS
"    RENAL CONSULT NOTE    REQUESTING PHYSICIAN: Dr. Miramontes     REASON FOR CONSULT: YADIRA on CKD     ASSESSMENT/PLAN:    CKD 4 - Baseline Cr appears to be 1.1-1.4; labile renal function throughout prolonged hospital stay with hemodynamics and eGFR by cystatin C only 27 though recently her Cr has been within its usual baseline again of 1.1-1.3. Past UA's appear mostly bland aside from some very mild proteinuria and occasional RBC's though specimens often with squamous contamination. No hydronephrosis on CT AP 4/7/25 but noted \"perinephric sweat\" felt to be CKD related. Given stability, would defer further workup to the outpatient setting once she discharges to TCU and can be seen by nephrology closer to home. Our service will sign off.     Acute on chronic respiratory failure - with baseline severe COPD, ADEEL/OHS and subsegmental PE requiring prolonged mechanical ventilation; she is now on daytime RA and PRN nocturnal O2 but not requiring BiPAP     Pulmonary embolism - on apixaban     Hyperlipidemia - on statin     Type 2 diabetes - insulin per [facility]     Intellectual disability     Atrial fibrillation with RVR - not currently on rate control; on apixaban     Hypertension - on amlodipine 5 mg daily     HPI: Marly Cannon is a 58 yo F with PMH significant for severe COPD, type 2 diabetes, CKD 3, hyperparathyroidism and intellectual delay who was admitted to OSH in [place], [place] with diabetic ketosis, acute on chronic respiratory failure and found to have pneumonia and subsegmental PE in LLL, and atrial fibrillation with RVR. Ultimately required intubation and ventilator support but was unsuccessful with weaning and ultimately transferred to [facility]; extubated on 4/1/25 and did not require tracheostomy. Required two I&D of perineal abscess and is now off antibiotics. Transferred to [facility] for ongoing cares. Given her hemodynamic instability her renal function has been labile throughout her various admissions; peak Cr 2.1 " at the end of March but now stable at her previous baseline of 1.2-1.3 for the past several weeks. However, eGFR by cystatin C noted to be only 27 and nephrology has been asked to consult.      Patient seen at bedside. She is alert and on RA. She has no specific concerns or complaints. She feels she is eating and drinking well. Denies any shortness of breath. No issues with dizziness or lightheadedness when moving around her room or working with therapies. She feels she has been urinating well. No LE edema.     Discussed with Dr. Miramontes.     REVIEW OF SYSTEMS:  Comprehensive ROS negative except per HPI     ALLERGIES/SENSITIVITIES:  No Known Allergies  Social History     Tobacco Use    Smoking status: Former     Current packs/day: 1.00     Average packs/day: 1 pack/day for 46.4 years (46.4 ttl pk-yrs)     Types: Cigarettes     Start date: 1/1/1979     Passive exposure: Current    Smokeless tobacco: Never    Tobacco comments:     Declined QP 05/13/2020   Vaping Use    Vaping status: Never Used   Substance Use Topics    Alcohol use: No     Alcohol/week: 0.0 standard drinks of alcohol    Drug use: No     I have reviewed this patient's family history and updated it with pertinent information if needed.  Family History   Problem Relation Age of Onset    Diabetes Mother     Diabetes Father     Hypertension Brother     Diabetes Sister          PHYSICAL EXAM:  Physical Exam   Temp: 97.7  F (36.5  C) Temp src: Oral BP: 121/67 Pulse: 83   Resp: 20 SpO2: 96 % O2 Device: Oxymask Oxygen Delivery: 2.5 LPM  Vitals:    05/03/25 0558 05/04/25 0643 05/08/25 0553   Weight: 82.9 kg (182 lb 12.8 oz) 83 kg (183 lb) 87.4 kg (192 lb 11.2 oz)     Vital Signs with Ranges  Temp:  [97.7  F (36.5  C)-98.9  F (37.2  C)] 97.7  F (36.5  C)  Pulse:  [83-88] 83  Resp:  [18-20] 20  BP: (121-127)/(67-71) 121/67  SpO2:  [93 %-96 %] 96 %  I/O last 3 completed shifts:  In: 840 [P.O.:840]  Out: 1200 [Urine:1200]    @TMAXR(24)@    Patient Vitals for the  past 72 hrs:   Weight   05/08/25 0553 87.4 kg (192 lb 11.2 oz)       General - Alert, NAD   Respiratory - Lungs CTA bilat, on RA   Cardiovascular - AP RRR without murmur  Extremities - well perfused, no edema  Neurologic - grossly intact  Psych: Pleasant, interactive     Laboratory:     Recent Labs   Lab 05/09/25 0619 05/08/25  0644 05/05/25  0654 05/05/25  0553   WBC 8.8  --  8.2  --    RBC 3.80  --  3.81  --    HGB 11.7  --  11.8 11.1*   HCT 35.4  --  36.4  --     334 304  --        Basic Metabolic Panel:  Recent Labs   Lab 05/09/25  0811 05/09/25  0619 05/08/25  2111 05/08/25  1719 05/08/25  1148 05/08/25  0815 05/08/25  0644 05/05/25  0813 05/05/25  0654 05/05/25  0553   NA  --  140  --   --   --   --   --   --  142 140   POTASSIUM  --  4.5  --   --   --   --   --   --  4.3 4.0   CHLORIDE  --  102  --   --   --   --   --   --  103  --    CO2  --  31*  --   --   --   --   --   --  30*  --    BUN  --  47.4*  --   --   --   --   --   --  49.6*  --    CR  --  1.18*  --   --   --   --  1.30*  --  1.21*  --    * 132* 245* 279* 270* 232*  --    < > 132* 132*   NORM  --  9.7  --   --   --   --   --   --  9.7  --     < > = values in this interval not displayed.       INRNo lab results found in last 7 days.    Recent Labs   Lab Test 05/09/25 0619 05/05/25 0654   POTASSIUM 4.5 4.3   CHLORIDE 102 103   BUN 47.4* 49.6*      Recent Labs   Lab Test 05/05/25  0654 04/21/25  0636 04/13/25  0214 03/31/25 2237 03/28/25  0235 03/27/25  0908   ALBUMIN 3.4*  --  2.9*  --    < >  --    BILITOTAL 0.4  --  0.2  --    < >  --    ALT 13 13 8  --    < >  --    AST 15 18 12  --    < >  --    PROTEIN  --   --   --  Negative  --  10*    < > = values in this interval not displayed.       Personally reviewed today's laboratory studies      Thank you for involving us in the care of this patient. We will continue to follow along with you.    Gayla Lombardo PA-C   [facility]  [phone]

## 2025-05-09 NOTE — PLAN OF CARE
Problem: Wound  Goal: Optimal Wound Healing  Intervention: Promote Wound Healing  Recent Flowsheet Documentation  Taken 5/8/2025 2200 by Olga Murray, RN  Sleep/Rest Enhancement:   awakenings minimized   comfort measures   noise level reduced   regular sleep/rest pattern promoted   room darkened  Adequate Nutrition and fluid intake

## 2025-05-09 NOTE — PROGRESS NOTES
Referrals for TCU placement:      Writer called and left message for Naomie in admissions @ [facility] [phone]. [phone] (main).      Writer called and left message for Juan Carlos @ [facility] in [place], [place]. Juan Carlos's number  [phone],     Writer left message for Zahida @ [facility] [phone]    Writer called and left message for admissions @ [facility] [phone], [phone] (admissions).     Writer spoke with Makenna @ [facility]. Requested she screen for:  [facility] [place], [place]  [facility]         Arik Jane, [facility]  [phone]

## 2025-05-09 NOTE — DISCHARGE SUMMARY
LTACH  Hospitalist Discharge Summary      Date of Admission:  4/11/2025  Date of Discharge:  5/9/2025  Discharging Provider: Karolina Miramontes MD  Discharge Service: Hospitalist Service    Discharge Diagnoses   As per hosp course     Clinically Significant Risk Factors     # DMII: A1C = 12.7 % (Ref range: <5.7 %) within past 6 months       Follow-ups Needed After Discharge   Follow-up Appointments       Follow Up and recommended labs and tests      F/up PCP within 1 week after discharge  F/up with nephro 2-4 weeks post discharge  F/up with WOC at TCU                Discharge Disposition   Transferred to TCU  Condition at discharge: Stable    Hospital Course     Brief Course Prior to LTACH:  59 up F with h/o intellectual disability & memory issues, COPD, untreated ADEEL, tobacco use d/o, HTN, pAFib, & poorly controlled T2DM s/p previous hospitalization for DKA (3/18 - 3/20) who presented second time to hospital in [place] on 3/22/2025 with complaints of dyspnea, hyperglycemia, and chest pain. She was found to have an YADIRA, LLL PE, DKA, and A-fib with RVR. She was started on an insulin drip, therapeutic dose enoxaparin, Zosyn for possible pneumonia, and diltiazem drip for management of her RVR. Her DKA resolved, she was switched from diltiazem to amiodarone drip for management of A-fib with RVR; however, her degree of respiratory failure was still concerning and she was intubated 3/23 for management of worsening hypoxia.   She had a repeat CTA that demonstrated progression of her PE and RLL collapse concerning for mucous plugging and pneumonia.   She was switched from enoxaparin to heparin drip and her antibiotics were broadened to vancomycin and Zosyn. She was on norepinephrine for several days d/t hypotension attributed to sedation & sepsis-related vasoplegia. It appears that she has been doing fairly long SBTs since 3/26, which seemed to be going reasonably well per RT documentation. Primary team has been having  issues w/ progressively increasing FiO2 needs. She had a sputum culture grow Candida & has been on fluconazole since 3/25. Transferred to [facility] d/t concerns that she might require a tracheostomy secondary to her inability to liberate from the vent.   At [facility] patient was admitted to the ICU on a ventilator, successfully extubated 4/1 and downgraded to floor status 4/3.  Infectious disease consulted and continued patient on Zosyn course to be completed through 4/17/2025.  General surgery consulted for right keiry/perineal abscess and performed incision and drainage with Penrose drain placement 3/31 with subsequent repeat I&D 4/2 and 4/8.  Surgery requests wound VAC be changed by wound care Tuesdays and Thursdays.      LTACH Course:  She transferred from [facility] to [facility] for wound cares, therapies, and complex medical care.   4/12-4/14: Needing BiPAP overnight.  4/15-4/20: successfully weaned off NC O2 during the day, completed zosyn 4/17 4/21-4/27- wound is closing well - [facility] plan to remove penrose drain on 4/29/2025. Consider transfer to [facility]CU for ongoing therapy and wound care.  4/28-5/4:  New information regarding placement needs surfaced early in the week, likely needing group home. Wound vac remains--penrose drain removed--packing to tract along/under R labia. Care conference completed 4/30.     5/5:  slept wih oxymask at 2L overnight.    5/6: Glucose is still elevated will increase glargine to 35 units  5/7:  no overnight events, oxy mask 3L overnight  5/8: increase novolog to 10 unit(s) at meals, increase lantus to 38 unit(s)   Abd xray: mild colonic stool burden, no obstruction or free air. Nephrology consulted to establish care.    Acute on Chronic Hypoxic Respiratory Failure  VAP, PTA - resolved  COPD  ADEEL  Transferred from [facility] for concerns of tracheostomy need, was able to liberate from ventilator withOUT tracheostomy formation. Weaned to minimal supplemental  oxygen support (2-3L nasal).  - Completed zosyn 4/17  - inhalers as needed  -anoro ellipta daily  - RT following     Perineal Abscess  Condyloma Acuminata  s/p acyclovir and I&D x3  - zosyn completed 4/17  - rectal tube discontinued  - noguera for wound protection  - VAC in place  - Continue wound care per WOC nurse     PE, Acute, segmental, subsegmental  -apixaban 5mg PO BID    Seborrheic dermatitis:  -triamcinolone cream bid to face       T2DM  Poorly controlled as outpatient with A1c 12.7. Used to be on insulin pump. No longer on it due to intellectual delay.  - BG labile earlier during admission  - Lantus 38 unit(s)  subcutaneous qAM--increased on 5/8  - Discontinue I:C of 1:5 -> I:C 1:4 with meals- as pt doesn't understand   -novolog 10 unit(s) tid w/ meals -increased on 5/8  -sliding scale insulin      CKD stage 4  YADIRA  -appreciate nephro recommendations   - Creatinine stable. Monitor     Elevated Hgb in past  Historically high  Consistent with smoking (high CO), untreated ADEEL     History of intellectual delay: supportive care.    PROBLEMS MANAGED During this or previous Encounter Now Stable but Monitoring:    Reactive thrombocytosis:  Platelets historically in normal range (reviewed last 3 years)  Peaked at 700,000, now resolved  Likely  reactive - reviewed flow chart for acute thrombocytosis, posted in note  Normal peripheral smear, liver enzymes       Consultations This Hospital Stay   THERAPEUTIC RECREATION EVAL & TREAT  OCCUPATIONAL THERAPY ADULT IP CONSULT  PHYSICAL THERAPY ADULT IP CONSULT  NUTRITION SERVICES ADULT IP CONSULT  WOUND OSTOMY CONTINENCE NURSE  IP CONSULT  CARE MANAGEMENT / SOCIAL WORK IP CONSULT  PHARMACY LIAISON FOR MEDICATION COVERAGE CONSULT  PULMONARY IP CONSULT  NEPHROLOGY IP CONSULT  PHYSICAL THERAPY ADULT IP CONSULT  OCCUPATIONAL THERAPY ADULT IP CONSULT    Code Status   Full Code    Time Spent on this Encounter   Karolina WILLINGHAM MD, personally saw the patient today and spent  greater than 30 minutes discharging this patient.       Karolina Miramontes MD  [facility] [address] [place] [address]  Phone: [phone]  Fax: [phone]  ______________________________________________________________________    Physical Exam   Vital Signs: Temp: 97.7  F (36.5  C) Temp src: Oral BP: 121/67 Pulse: 83   Resp: 20 SpO2: 96 % O2 Device: Oxymask Oxygen Delivery: 2.5 LPM  Weight: 192 lbs 11.2 oz  General Appearance:   NAD  Respiratory:  rhonchi bilaterally  Cardiovascular: RRR S1S2  GI: soft, NT  Skin: .  wide-spread seborrheic dermatitis.  Large right groin wound - see WOC note - dressing in place  Neuro: Alert, generally nonfocal on motor and sensory testing       Primary Care Physician   Amberly Whyte    Discharge Orders      General info for SNF    Length of Stay Estimate: Short Term Care: Estimated # of Days <30  Condition at Discharge: Improving  Level of care:skilled   Rehabilitation Potential: Excellent  Admission H&P remains valid and up-to-date: Yes  Recent Chemotherapy: N/A  Use Nursing Home Standing Orders: Yes     Follow Up and recommended labs and tests    F/up PCP within 1 week after discharge  F/up with nephro 2-4 weeks post discharge  F/up with WOC at TCU     Reason for your hospital stay    60 yo admitted for chest pain.  She was found to have an YADIRA, LLL PE, DKA, and A-fib with RVR and R groin abscess requiring I &D and wound vac.  She is now off oxygen, glucose numbers have improved, and no longer in afib.  Needs to continue wound care at TCU with PT/OT     Activity - Up with nursing assistance     Physical Therapy Adult Consult    Evaluate and treat as clinically indicated.    Reason:  critical illness myopathy     Occupational Therapy Adult Consult    Evaluate and treat as clinically indicated.    Reason:  critical illness myopathy     Fall precautions     Diet    Follow this diet upon discharge: Current Diet:Orders Placed This  Encounter      Snacks/Supplements Adult: Expedite Cup; With Meals      Snacks/Supplements Adult: Magic Cup; With Meals      Snacks/Supplements Adult: Gelatein 20 (sugar-free); With Meals      Combination Diet Moderate Consistent Carb (60 g CHO per Meal) Diet; Easy to Chew (level 7); Thin Liquids (level 0)       Significant Results and Procedures   Most Recent 3 CBC's:  Recent Labs   Lab Test 05/09/25  0619 05/08/25  0644 05/05/25  0654 05/05/25  0553 04/30/25  0613 04/29/25  0625   WBC 8.8  --  8.2  --   --  10.4   HGB 11.7  --  11.8 11.1*   < > 12.0   MCV 93  --  96  --   --  93    334 304  --    < > 326    < > = values in this interval not displayed.     Most Recent 3 BMP's:  Recent Labs   Lab Test 05/09/25  1208 05/09/25  0811 05/09/25 0619 05/08/25  0815 05/08/25 0644 05/05/25  0813 05/05/25  0654 05/05/25  0553 04/29/25  0746 04/29/25 0625   NA  --   --  140  --   --   --  142 140   < > 137   POTASSIUM  --   --  4.5  --   --   --  4.3 4.0   < > 4.2   CHLORIDE  --   --  102  --   --   --  103  --   --  99   CO2  --   --  31*  --   --   --  30*  --   --  30*   BUN  --   --  47.4*  --   --   --  49.6*  --   --  43.7*   CR  --   --  1.18*  --  1.30*  --  1.21*  --    < > 1.20*   ANIONGAP  --   --  7  --   --   --  9  --   --  8   NORM  --   --  9.7  --   --   --  9.7  --   --  9.6   * 136* 132*   < >  --    < > 132* 132*   < > 130*    < > = values in this interval not displayed.     Most Recent 2 LFT's:  Recent Labs   Lab Test 05/05/25  0654 04/21/25  0636 04/13/25  0214   AST 15 18 12   ALT 13 13 8   ALKPHOS 87  --  71   BILITOTAL 0.4  --  0.2     Most Recent 3 INR's:  Recent Labs   Lab Test 03/31/25  2206 03/23/25  1826   INR 1.24* 1.29*   ,   Results for orders placed or performed during the hospital encounter of 04/11/25   XR Abdomen Port 1 View    Narrative    EXAM: XR ABDOMEN PORT 1 VIEW  LOCATION: [facility]  DATE: 5/8/2025    INDICATION: Left upper quadrant abdominal  pain  COMPARISON: 3/31/2025      Impression    IMPRESSION: Mild colonic stool burden. Bowel gas pattern is normal. Nothing for obstruction or free air. No evidence for renal stones.       Discharge Medications   Current Discharge Medication List        START taking these medications    Details   albuterol (PROVENTIL) (2.5 MG/3ML) 0.083% neb solution Take 1 vial (2.5 mg) by nebulization every 2 hours as needed for shortness of breath.    Associated Diagnoses: Open wound      apixaban ANTICOAGULANT (ELIQUIS) 5 MG tablet Take 1 tablet (5 mg) by mouth 2 times daily.    Associated Diagnoses: Open wound      !! insulin aspart (NOVOLOG PEN) 100 UNIT/ML pen Inject 1-5 Units subcutaneously at bedtime.    Associated Diagnoses: Open wound      !! insulin aspart (NOVOLOG PEN) 100 UNIT/ML pen Inject 1-7 Units subcutaneously 3 times daily (with meals).    Associated Diagnoses: Open wound      !! insulin aspart (NOVOLOG PEN) 100 UNIT/ML pen Inject 10 Units subcutaneously 3 times daily (with meals).    Associated Diagnoses: Open wound      insulin glargine (LANTUS PEN) 100 UNIT/ML pen Inject 38 Units subcutaneously at bedtime.    Comments: If Lantus is not covered by insurance, may substitute Basaglar or Semglee or other insulin glargine product per insurance preference at same dose and frequency.    Associated Diagnoses: Open wound      magnesium hydroxide (MILK OF MAGNESIA) 400 MG/5ML suspension Take 30 mLs by mouth daily as needed for constipation (Use if polyethylene glycol (Miralax) is not effective after 24 hours.).    Associated Diagnoses: Open wound      menthol-zinc oxide (CALMOSEPTINE) 0.44-20.6 % OINT ointment Apply topically 4 times daily as needed for skin protection.    Associated Diagnoses: Open wound      multivitamin w/minerals (THERA-VIT-M) tablet Take 1 tablet by mouth daily.    Associated Diagnoses: Open wound      ondansetron (ZOFRAN ODT) 4 MG ODT tab Take 1 tablet (4 mg) by mouth every 6 hours as needed.     Associated Diagnoses: Open wound      oxyCODONE (ROXICODONE) 5 MG tablet Take 1 tablet (5 mg) by mouth every 4 hours as needed for moderate pain.    Associated Diagnoses: Open wound      polyethylene glycol (MIRALAX) 17 g packet Take 17 g by mouth daily as needed for constipation.    Associated Diagnoses: Open wound      sennosides (SENOKOT) 8.6 MG tablet Take 1 tablet by mouth 2 times daily as needed for constipation.    Associated Diagnoses: Open wound      triamcinolone (KENALOG) 0.1 % external cream Apply topically 2 times daily. Can be held when patient's dermatitis clears up    Associated Diagnoses: Open wound      umeclidinium-vilanterol (ANORO ELLIPTA) 62.5-25 MCG/ACT oral inhaler Inhale 1 puff into the lungs daily.    Associated Diagnoses: Open wound       !! - Potential duplicate medications found. Please discuss with provider.        CONTINUE these medications which have CHANGED    Details   acetaminophen (TYLENOL) 325 MG tablet Take 2 tablets (650 mg) by mouth every 6 hours as needed for mild pain or fever.    Associated Diagnoses: Open wound           CONTINUE these medications which have NOT CHANGED    Details   ketoconazole (NIZORAL) 2 % external cream Apply topically daily  Qty: 60 g, Refills: 1    Associated Diagnoses: Seborrheic dermatitis      amLODIPine (NORVASC) 5 MG tablet Take 1 tablet (5 mg) by mouth daily  Qty: 90 tablet, Refills: 3    Associated Diagnoses: Essential hypertension      aspirin (ASPIRIN LOW DOSE) 81 MG chewable tablet CHEW AND SWALLOW 1 TABLET BY MOUTH DAILY  Qty: 90 tablet, Refills: 1    Associated Diagnoses: Essential hypertension      atorvastatin (LIPITOR) 40 MG tablet TAKE 1 TABLET BY MOUTH AT BEDTIME  Qty: 90 tablet, Refills: 2    Associated Diagnoses: Hyperlipidemia, unspecified hyperlipidemia type      blood glucose (ONETOUCH ULTRA) test strip USE TO TEST BLOOD SUGAR 4 TIMES DAILY  Qty: 100 strip, Refills: 4    Associated Diagnoses: Type 2 diabetes mellitus with  hyperglycemia, with long-term current use of insulin (H)      Cholecalciferol (VITAMIN D3) 50 MCG (2000 UT) CAPS TAKE 1 CAPSULE BY MOUTH DAILY  Qty: 90 capsule, Refills: 0    Associated Diagnoses: Vitamin D deficiency      !! Continuous Glucose Sensor (DEXCOM G7 SENSOR) MISC 1 each every 10 days. Change sensor every 10 days  Qty: 9 each, Refills: 5    Associated Diagnoses: Type 2 diabetes mellitus with hyperglycemia, with long-term current use of insulin (H)      !! Continuous Glucose Sensor (FREESTYLE HANK 2 PLUS SENSOR) MISC 1 each every 14 days.  Qty: 2 each, Refills: 5    Associated Diagnoses: Type 2 diabetes mellitus with hyperglycemia, with long-term current use of insulin (H)      empagliflozin (JARDIANCE) 25 MG TABS tablet Take 1 tablet (25 mg) by mouth daily  Qty: 90 tablet, Refills: 3    Associated Diagnoses: Type 2 diabetes, HbA1c goal < 7% (H)      !! Insulin Disposable Pump (OMNIPOD 5 LIBRE2 PLUS G6 PODS) MISC 1 each every 72 hours.  Qty: 10 each, Refills: 5    Associated Diagnoses: Type 2 diabetes mellitus with hyperglycemia, with long-term current use of insulin (H)      Insulin Disposable Pump (OMNIPOD 5 LIBRE2 PLUS G6) KIT 1 each every 72 hours.  Qty: 1 kit, Refills: 0    Associated Diagnoses: Type 2 diabetes mellitus with hyperglycemia, with long-term current use of insulin (H)      !! Insulin Disposable Pump (OMNIPOD DASH PODS, GEN 4,) MISC Inject 1 pod subcutaneously every 48 hours.  Qty: 15 each, Refills: 5    Associated Diagnoses: Type 2 diabetes mellitus with hyperglycemia, with long-term current use of insulin (H)      insulin pen needle (32G X 4 MM) 32G X 4 MM miscellaneous Use 1 pen needles daily  Qty: 100 each, Refills: 3    Associated Diagnoses: Type 2 diabetes mellitus with hyperglycemia, without long-term current use of insulin (H)      INSULIN PUMP - OUTPATIENT Insulin pump  Omnipod Dash  Date: 1/23/25  Basal: 1.3 (new)  Total basal: 31.2   CR:15  ISF: 50  BG target: 120  BG  correction: 130  Active insulin: 4 hours    Associated Diagnoses: Type 2 diabetes mellitus with hyperglycemia, with long-term current use of insulin (H)      OneTouch Delica Lancets 33G MISC Inject 1 each Subcutaneous 3 times daily (before meals)  Qty: 100 each, Refills: 11    Comments: Do you deliver?   Please call patient ([phone]) to let them know.  Thanks  Associated Diagnoses: Type 2 diabetes mellitus with hyperglycemia, with long-term current use of insulin (H)      order for DME Women briefs  Qty: 50 each, Refills: 1    Associated Diagnoses: Female stress incontinence       !! - Potential duplicate medications found. Please discuss with provider.        STOP taking these medications       acyclovir (ZOVIRAX) 400 MG tablet Comments:   Reason for Stopping:         albuterol (PROAIR HFA/PROVENTIL HFA/VENTOLIN HFA) 108 (90 Base) MCG/ACT inhaler Comments:   Reason for Stopping:         insulin aspart (NOVOLOG VIAL) 100 UNITS/ML vial Comments:   Reason for Stopping:         insulin glargine U-300 (TOUJEO SOLOSTAR) 300 UNIT/ML (1 units dial) pen Comments:   Reason for Stopping:         ipratropium-albuterol (COMBIVENT RESPIMAT)  MCG/ACT inhaler Comments:   Reason for Stopping:         lisinopril (ZESTRIL) 40 MG tablet Comments:   Reason for Stopping:         primidone (MYSOLINE) 50 MG tablet Comments:   Reason for Stopping:         tiotropium-olodaterol 2.5-2.5 MCG/ACT AERS Comments:   Reason for Stopping:         triamcinolone (KENALOG) 0.1 % external ointment Comments:   Reason for Stopping:             Allergies   No Known Allergies

## 2025-05-09 NOTE — PROGRESS NOTES
Care Management Discharge Note    Discharge Date: 05/12/2025       Discharge Disposition: Transitional Care. Zev Sanchez ([facility])    Discharge Services:  TCU    Discharge DME: Walker, wound vac    Discharge Transportation: other (see comments)    Private pay costs discussed: Not applicable    Does the patient's insurance plan have a 3 day qualifying hospital stay waiver?  No    PAS Confirmation Code: [id number]  Patient/family educated on Medicare website which has current facility and service quality ratings: yes    Education Provided on the Discharge Plan: Yes  Persons Notified of Discharge Plans: yes.  Spoke with patient x2.     Patient/Family in Agreement with the Plan: yes    Handoff Referral Completed: No, handoff not indicated or clinically appropriate    Additional Information:  Patient to discharge today to TCU.  Transitional Care. Zev Sanchez ([facility])  Writer spoke with patient to update and confirm.   Writer called and spoke with Nelly @ [facility] [phone]. She is patient[place]  to give update.     AGATHA ShawSW

## 2025-05-09 NOTE — PLAN OF CARE
Problem: Adult Inpatient Plan of Care  Goal: Optimal Comfort and Wellbeing  Outcome: Met   Goal Outcome Evaluation:         Alert and oriented X4, complained of pain during wound vac change, pain 10, gave PRN Oxycodone 10 mg, VS stable, , 208, wound vac changed, large soft BM, noguera catheter output 450ml clear gail urine, tolerated sitting on chair, pleasant and cooperative with cares.  Report given to [facility] Nurse Manager.  Plan on discharge at 1530.  All belongings packed by RACHEL Santos RN

## 2025-05-09 NOTE — DISCHARGE SUMMARY
Physical Therapy Discharge Summary    Reason for therapy discharge:    Discharged to transitional care facility.    Progress towards therapy goal(s). See goals on Care Plan in [facility] electronic health record for goal details.  Goals not met.  Barriers to achieving goals:   discharge from facility.    Therapy recommendation(s):    Continued therapy is recommended.  Rationale/Recommendations:  to improve strength, balance, and mobility.      Arik Suero, PT, WCC, CLT

## 2025-05-09 NOTE — SIGNIFICANT EVENT
Per report of charge rn and na/rn patient had an assisted slow fall to the floor whilst returning from bathroom-did not hit her head, landed on her butt, she denies pain/discomfort, vitals normal, alert oriented-we will be putting bedside commode from now on. Text sent to md. Will continue to monitor.    Addendum @ 1884  At hs,Palpated coccyx, right/left IT and spine from coccyx to base of her occiput-patient denied pain or discomfort. Continue to monitor

## 2025-05-09 NOTE — PROGRESS NOTES
SPIRITUAL HEALTH SERVICES (SHS)  SPIRITUAL ASSESSMENT Progress Note  [facility]. Unit LTACH     REFERRAL SOURCE: Followup visit      Marly was very alert and relatively pain free and seemed delighted to have a visitor. We spoke at length and she is open to having further SHS visits.  She is from [facility] so is not local nor is she affiliated with a local [employer].      PLAN: Though she did not express any pressing spiritual or emotional needs she's clearly open to SHS visits, especially since she's so far from home. SHS will follow during the remainder of her hospital stay.      Vero Luciano, Ph.D., [facility]      Securely Message with Soundflavor or TextPaFreshplum Pager.    Non-urgent needs:  Phone  to leave a message

## 2025-05-10 NOTE — PLAN OF CARE
Occupational Therapy Discharge Summary    Reason for therapy discharge:    Discharged to transitional care facility.    Progress towards therapy goal(s). See goals on Care Plan in [facility] electronic health record for goal details.  Goals partially met.  Barriers to achieving goals:   discharge from facility.    Therapy recommendation(s):    Continued therapy is recommended.  Rationale/Recommendations:  Would benefit from further skilled OT to address daily tasks, general strength and activity tolerance.

## 2025-05-11 NOTE — PROGRESS NOTES
[facility] GERIATRICS    PRIMARY CARE PROVIDER AND CLINIC:  Amberly Whyte NP, [address] [facility] / [facility] [address]  Chief Complaint   Patient presents with    Hospital F/U      [place] Medical Record Number:  [id number]  Place of Service where encounter took place:  [facility] (TCU)(SNF) [[id number]]    Marly Cannon  is a 59 year old  (1965), admitted to the above facility from  [facility] . Hospital stay 4/11/25 through 5/9/25..   HPI:    Brief Course Prior to LTACH:  59 up F with h/o intellectual disability & memory issues, COPD, untreated ADEEL, tobacco use d/o, HTN, pAFib, & poorly controlled T2DM s/p previous hospitalization for DKA (3/18 - 3/20) who presented second time to hospital in [place] on 3/22/2025 with complaints of dyspnea, hyperglycemia, and chest pain.     She was found to have an YADIRA, LLL PE, DKA, and A-fib with RVR. She was started on an insulin drip, therapeutic dose enoxaparin, Zosyn for possible pneumonia, and diltiazem drip for management of her RVR. Her DKA resolved, she was switched from diltiazem to amiodarone drip for management of A-fib with RVR; however, her degree of respiratory failure was still concerning and she was intubated 3/23 for management of worsening hypoxia.   She had a repeat CTA that demonstrated progression of her PE and RLL collapse concerning for mucous plugging and pneumonia.     She was switched from enoxaparin to heparin drip and her antibiotics were broadened to vancomycin and Zosyn. She was on norepinephrine for several days d/t hypotension attributed to sedation & sepsis-related vasoplegia. It appears that she has been doing fairly long SBTs since 3/26, which seemed to be going reasonably well per RT documentation. Primary team has been having issues w/ progressively increasing FiO2 needs. She had a sputum culture grow Candida & has been on fluconazole since 3/25. Transferred to [facility] d/t concerns that she might require a tracheostomy secondary to her inability to liberate from the vent.     At [facility] patient was admitted to the ICU on a ventilator, successfully extubated 4/1 and downgraded to floor status 4/3.  Infectious disease consulted and continued patient on Zosyn course to be completed through 4/17/2025.    General surgery consulted for right keiry/perineal abscess and performed incision and drainage with Penrose drain placement 3/31 with subsequent repeat I&D 4/2 and 4/8.  Surgery requests wound VAC be changed by wound care Tuesdays and Thursdays.      LTACH Course:  She transferred from [facility] to [facility] for wound cares, therapies, and complex medical care.   4/12-4/14: Needing BiPAP overnight.  4/15-4/20: successfully weaned off NC O2 during the day, completed zosyn 4/17 4/21-4/27- wound is closing well - [facility] plan to remove penrose drain on 4/29/2025. Consider transfer to [facility] for ongoing therapy and wound care.  4/28-5/4:  New information regarding placement needs surfaced early in the week, likely needing group home. Wound vac remains--penrose drain removed--packing to tract along/under R labia. Care conference completed 4/30.     5/5:  slept wih oxymask at 2L overnight.    5/6: Glucose is still elevated will increase glargine to 35 units  5/7:  no overnight events, oxy mask 3L overnight  5/8: increase novolog to 10 unit(s) at meals, increase lantus to 38 unit(s)   Abd xray: mild colonic stool burden, no obstruction or free air. Nephrology consulted to establish care.    The primary encounter diagnosis was Physical deconditioning. Diagnoses of Generalized muscle weakness, Acute on chronic respiratory failure with hypoxia and hypercapnia (H), History of pneumonia, Chronic obstructive pulmonary disease with acute exacerbation (H), ADEEL (obstructive sleep apnea), Perineal abscess, Condyloma acuminata, Acute pulmonary embolism, unspecified pulmonary  embolism type, unspecified whether acute cor pulmonale present (H), Pulmonary emphysema, unspecified emphysema type (H), Type 2 diabetes, HbA1c goal < 7% (H), Diabetic polyneuropathy associated with diabetes mellitus due to underlying condition (H), YADIRA (acute kidney injury), Chronic kidney disease, stage 4 (severe) (H), Essential hypertension, PAF (paroxysmal atrial fibrillation) (H), Hyperlipidemia, unspecified hyperlipidemia type, Unable to care for self, Intellectual delay, Reactive thrombocytosis, Seborrheic dermatitis, and Open wound were also pertinent to this visit.    Met with patient who was found lying in bed with wound vac alarming. Noted area of tegaderm lifting off skin which was causing air leak. Tegaderm was reapplied with relief. She denies any chest pain, palpitations, shortness of breath, RUBY, lightheadedness, dizziness, or cough. Denies any abdominal discomfort. Denies dysuria or frequency. Landrum catheter present with clear yellow urine present. Per chart review, this was placed for wound protection. Patient wondering when this can be removed. Denies loose or constipation. LBM today reported. Appetite good. Sleeping well. Some minimal complaints of pain reported to right groin area with wound vac changes. Mood stable. Nursing denies any acute concerns.     BP Readings from Last 3 Encounters:   05/12/25 129/63   05/09/25 122/67   04/11/25 129/68     Wt Readings from Last 5 Encounters:   05/12/25 83.6 kg (184 lb 6.4 oz)   05/08/25 87.4 kg (192 lb 11.2 oz)   04/11/25 101.3 kg (223 lb 5.2 oz)   03/31/25 109.4 kg (241 lb 2.9 oz)   03/18/25 88.1 kg (194 lb 3.6 oz)     CODE STATUS/ADVANCE DIRECTIVES DISCUSSION:  Full Code  CPR/Full code   ALLERGIES: No Known Allergies   PAST MEDICAL HISTORY:   Past Medical History:   Diagnosis Date    COPD (chronic obstructive pulmonary disease) (H)     Diabetes (H)     Hypertension       PAST SURGICAL HISTORY:   has a past surgical history that includes Colonoscopy  (N/A, 08/20/2015); Colonoscopy (N/A, 09/21/2018); Biopsy breast (Left, 02/14/2008); Incision and drainage perineal, combined (N/A, 4/2/2025); and Irrigation and debridement sacral wound, combined (Right, 4/8/2025).  FAMILY HISTORY: family history includes Diabetes in her father, mother, and sister; Hypertension in her brother.  SOCIAL HISTORY:   reports that she has quit smoking. Her smoking use included cigarettes. She started smoking about 46 years ago. She has a 46.4 pack-year smoking history. She has been exposed to tobacco smoke. She has never used smokeless tobacco. She reports that she does not drink alcohol and does not use drugs.  Patient's living condition: lives alone in [facility]. She is wanting to return up to either [place] or [facility] as she has family in area. Several referrals place, but no openings at this time.     Post Discharge Medication Reconciliation Status:   MED REC REQUIRED  Post Medication Reconciliation Status:  Discharge medications reconciled and changed, see notes/orders       Current Outpatient Medications   Medication Sig Dispense Refill    amLODIPine (NORVASC) 5 MG tablet Take 1 tablet (5 mg) by mouth daily. HOLD if SBP<100      Continuous Glucose  (FREESTYLE HANK 2 READER) AISHA 1 each once for 1 dose. Use to read blood sugars per 's instructions. 1 each 0    Continuous Glucose Sensor (FREESTYLE HANK 2 SENSOR) MISC 1 each every 14 days. Use 1 sensor every 14 days. Use to read blood sugars per 's instructions. 6 each 5    insulin aspart (NOVOLOG PEN) 100 UNIT/ML pen Inject 10 Units subcutaneously 3 times daily (with meals). HOLD if Blood Glucose <120      insulin glargine (LANTUS PEN) 100 UNIT/ML pen Inject 38 Units subcutaneously at bedtime. HOLD if Blood Glucose<120      acetaminophen (TYLENOL) 325 MG tablet Take 2 tablets (650 mg) by mouth every 6 hours as needed for mild pain or fever.      albuterol (PROVENTIL) (2.5 MG/3ML) 0.083% neb  "solution Take 1 vial (2.5 mg) by nebulization every 2 hours as needed for shortness of breath.      apixaban ANTICOAGULANT (ELIQUIS) 5 MG tablet Take 1 tablet (5 mg) by mouth 2 times daily.      atorvastatin (LIPITOR) 40 MG tablet TAKE 1 TABLET BY MOUTH AT BEDTIME 90 tablet 2    Cholecalciferol (VITAMIN D3) 50 MCG (2000 UT) CAPS TAKE 1 CAPSULE BY MOUTH DAILY 90 capsule 0    empagliflozin (JARDIANCE) 25 MG TABS tablet Take 1 tablet (25 mg) by mouth daily 90 tablet 3    insulin aspart (NOVOLOG PEN) 100 UNIT/ML pen Inject 1-5 Units subcutaneously at bedtime.      insulin aspart (NOVOLOG PEN) 100 UNIT/ML pen Inject 1-7 Units subcutaneously 3 times daily (with meals).      insulin pen needle (32G X 4 MM) 32G X 4 MM miscellaneous Use 1 pen needles daily 100 each 3    menthol-zinc oxide (CALMOSEPTINE) 0.44-20.6 % OINT ointment Apply topically 4 times daily as needed for skin protection.      multivitamin w/minerals (THERA-VIT-M) tablet Take 1 tablet by mouth daily.      ondansetron (ZOFRAN ODT) 4 MG ODT tab Take 1 tablet (4 mg) by mouth every 6 hours as needed.      OneTouch Delica Lancets 33G MISC Inject 1 each Subcutaneous 3 times daily (before meals) 100 each 11    order for DME Women briefs 50 each 1    polyethylene glycol (MIRALAX) 17 g packet Take 17 g by mouth daily as needed for constipation.      sennosides (SENOKOT) 8.6 MG tablet Take 1 tablet by mouth 2 times daily as needed for constipation.      umeclidinium-vilanterol (ANORO ELLIPTA) 62.5-25 MCG/ACT oral inhaler Inhale 1 puff into the lungs daily.       No current facility-administered medications for this visit.       ROS:  Limited secondary to cognitive impairment but today pt reports as noted per HPI    Vitals:  /63   Pulse 70   Temp 97.8  F (36.6  C)   Resp 18   Ht 1.707 m (5' 7.2\")   Wt 83.6 kg (184 lb 6.4 oz)   SpO2 98%   BMI 28.71 kg/m    Exam:  GENERAL APPEARANCE:  Alert, in no distress, cooperative  ENT:  Mouth and posterior oropharynx " normal, moist mucous membranes, [place]  EYES:  EOM, conjunctivae, lids, pupils and irises normal  NECK:  No adenopathy,masses or thyromegaly  RESP:  respiratory effort and palpation of chest normal, lungs clear to auscultation , no respiratory distress  CV:  regular rate and rhythm, no murmur, rub, or gallop, peripheral edema 1+ in BLE  ABDOMEN:  normal bowel sounds, soft, nontender, no hepatosplenomegaly or other masses, no guarding or rebound  M/S:   Maximum assistance for all ADLs, transfers and cares. Wheelchair for long distance needs  SKIN:  Inspection of skin and subcutaneous tissue baseline, wound vac C/D/I to right groin  NEURO:   Cranial nerves 2-12 are normal tested and grossly at patient's baseline, no purposeful movement in upper and lower extremities  PSYCH:  oriented X 3, memory impaired , affect and mood normal    Lab/Diagnostic data:  Most Recent 3 CBC's:  Recent Labs   Lab Test 05/09/25  0619 05/08/25  0644 05/05/25  0654 05/05/25  0553 04/30/25  0613 04/29/25  0625   WBC 8.8  --  8.2  --   --  10.4   HGB 11.7  --  11.8 11.1*   < > 12.0   MCV 93  --  96  --   --  93    334 304  --    < > 326    < > = values in this interval not displayed.     Most Recent 3 BMP's:  Recent Labs   Lab Test 05/09/25  1208 05/09/25  0811 05/09/25  0619 05/08/25  0815 05/08/25  0644 05/05/25  0813 05/05/25  0654 05/05/25  0553 04/29/25  0746 04/29/25  0625   NA  --   --  140  --   --   --  142 140   < > 137   POTASSIUM  --   --  4.5  --   --   --  4.3 4.0   < > 4.2   CHLORIDE  --   --  102  --   --   --  103  --   --  99   CO2  --   --  31*  --   --   --  30*  --   --  30*   BUN  --   --  47.4*  --   --   --  49.6*  --   --  43.7*   CR  --   --  1.18*  --  1.30*  --  1.21*  --    < > 1.20*   ANIONGAP  --   --  7  --   --   --  9  --   --  8   NORM  --   --  9.7  --   --   --  9.7  --   --  9.6   * 136* 132*   < >  --    < > 132* 132*   < > 130*    < > = values in this interval not displayed.     Most Recent 2  LFT's:  Recent Labs   Lab Test 05/05/25  0654 04/21/25  0636 04/13/25  0214   AST 15 18 12   ALT 13 13 8   ALKPHOS 87  --  71   BILITOTAL 0.4  --  0.2     Most Recent 3 BNP's:  Recent Labs   Lab Test 03/23/25  1404   NTBNPI 2,201*     Most Recent Cholesterol Panel:  Recent Labs   Lab Test 05/16/24  1313   CHOL 173   LDL 89   HDL 59   TRIG 127     Most Recent TSH and T4:  Recent Labs   Lab Test 03/18/25  1209   TSH 1.18     Most Recent Hemoglobin A1c:  Recent Labs   Lab Test 03/22/25  1731   A1C 12.7*     Most Recent Urinalysis:  Recent Labs   Lab Test 03/31/25 2237   COLOR Light Yellow   APPEARANCE Turbid*   URINEGLC 70*   URINEBILI Negative   URINEKETONE Negative   SG 1.013   UBLD 0.2 mg/dL*   URINEPH 5.0   PROTEIN Negative   NITRITE Negative   LEUKEST 25 Santy/uL*   RBCU 36*   WBCU 3     Most Recent ESR & CRP:  Recent Labs   Lab Test 04/10/25  0514 03/26/25  0324 07/01/19  1130   CRP  --   --  <2.9   CRPI 20.90*   < >  --     < > = values in this interval not displayed.     Most Recent Anemia Panel:  Recent Labs   Lab Test 05/09/25  0619 04/23/25  0623 04/21/25  0814 04/07/25  0542 04/06/25  0431 04/05/25  0451 04/19/23  1510 01/17/23  1505   WBC 8.8   < > 9.7   < > 7.3 7.7   < > 10.1   HGB 11.7   < > 11.2*   < > 10.1* 9.9*   < > 17.1*   HCT 35.4   < > 34.8*   < > 31.2* 32.5*   < > 50.1*   MCV 93   < > 96   < > 94 96   < > 88      < > 650*   < > 282 292   < > 263   IRON  --   --   --   --   --  51  --   --    IRONSAT  --   --   --   --   --  27  --   --    RETICABSCT  --   --  0.116*  --   --   --   --   --    RETP  --   --  3.2*  --   --   --   --   --    FEB  --   --   --   --   --  188*  --   --    OLIVA  --   --   --   --   --  407*  --   --    B12  --   --   --   --   --  859  --   --    FOLIC  --   --   --   --  4.9  --   --   --    EPOE  --   --   --   --   --   --   --  7    < > = values in this interval not displayed.     Magnesium   Date Value Ref Range Status   05/09/2025 1.9 1.7 - 2.3 mg/dL Final    06/21/2014 1.0 (L) 1.8 - 2.4 mg/dL Final     Vitamin D Deficiency Screening Results:  Lab Results   Component Value Date    VITDT 49 07/19/2023    VITDT 46 01/17/2023    VITDT 45 07/01/2019     ASSESSMENT/PLAN:  Physical deconditioning  Generalized muscle weakness  Unable to care for self  Intellectual delay  Comment: Chronic. S/T prolonged hospitalization  Plan:  -Continue Physical therapy and Occupational therapy as directed  -SW to remain involved for safe discharge planning needs  -Recommend LTC placement post TCU    Acute on Chronic Hypoxic Respiratory Failure  History of VAP  COPD  ADEEL  PE, Acute, segmental, subsegmental  Pulmonary emphysema  Comment: Acute on chronic. Admitted to [facility] on 3/22/25: She had a repeat CTA that demonstrated progression of her PE and RLL collapse concerning for mucous plugging and pneumonia. Primary team has been having issues w/ progressively increasing FiO2 needs. She had a sputum culture grow Candida & has been on fluconazole since 3/25. Transferred to [facility] d/t concerns that she might require a tracheostomy secondary to her inability to liberate from the vent. At [facility] patient was admitted to the ICU on a ventilator, successfully extubated 4/1 and downgraded to floor status 4/3. Infectious disease consulted and continued patient on Zosyn course to be completed through 4/17/2025. Patient was able to liberate from ventilator withOUT tracheostomy formation.   Plan:  -Monitor respiratory status  -Oxygen 3L via oxymask overnights.   -Encourage incentive spirometry every 4 hours while awake  -Continue apixaban 5mg BID  -Continue albuterol nebulization every 2 hour PRN  -Continue umeclidinium-vilanterol (anoro ellipta) 62.5-25mcg inhaler daily  -CMP, CBC, vitamin D, and vitamin B12 due 5/15/25  -Add quantiferon gold testing as this was a difficult stick today     Perineal Abscess  Condyloma Acuminata  Comment: Acute on chronic. Per recent  hospitalization-General surgery consulted for right keiry/perineal abscess and performed incision and drainage with Penrose drain placement 3/31 with subsequent repeat I&D 4/2 and 4/8. s/p acyclovir and I&D x3. Noted on 4/28/25-5/4/25: Wound vac remains--penrose drain removed--packing to tract along/under R labia.   Plan:  -Monitor for worsening s/symptoms of concerns  -Air mattress while on site  -Follow up with general surgery as indicated. Left message with them to see if she needs to follow up with them or if wound clinic needs are warranted at this time. Pending further confirmation  -Continue noguera catheter needs as this time. Recommend to start voiding trial end of week if able.   -Monitor pain complaints  -Continue Tylenol PRN  -Discontinue PRN oxycodone  -CMP, CBC, vitamin D, and vitamin B12 due 5/15/25  -Continue wound care as directed:  *Wound Vac Right Groin: Cleanse w/ Vashe prior to replacing NPWT Setting: -125 Window paned all periwound skin with Vac drape prior to applying sponge and pack into tunnel w/ black foam, then cover inferior prior drain opening w/ VAC Drape. Change Tues/Fridays     Seborrheic dermatitis:   Comment: Acute on chronic. Noted to face PTA  Plan:  -Monitor for worsening s/symptoms of concerns  -Stop topical agents as skin cleared.     T2DM  Poorly controlled as outpatient with A1c 12.7 in march 2025. Used to be on insulin pump. No longer on it due to intellectual delay.  Plan:  -Monitor Blood Glucose QID using Freestyle Bud 2 device. Change device every 14 days and PRN. Update provider if Blood Glucose<70 and.or >450  -Continue insulin aspart novolog sliding scale TID with meals and HS  -Continue jardiance 25mg daily  -Monitor hgb A1c every 3 months. Due June 2025  -Continue insulin aspart 10units TID with meals scheduled. HOLD if Blood Glucose<120  -Continue insulin glargine 38 units at HS. HOLD if Blood Glucose<120  -CMP, CBC, vitamin D, and vitamin B12 due 5/15/25         CKD  stage 4  YADIRA  Comment: Chronic. Baseline creatinine~1.1-1.3  Plan:  -Monitor urinary status  -Monitor for worsening s/symptoms of concerns  -CMP, CBC, vitamin D, and vitamin B12 due 5/15/25    Essential hypertension   Hyperlipidemia, unspecified hyperlipidemia type   Comment: Chronic. Based on JNC-8 goals,  patients age of 59 year old, presence of diabetes or CKD, and goals of care goal BP is  <140/90 mm Hg. Patient is stable with current plan of care and routine assessment..  Plan:  -Monitor BP and HR as directed  -Discontinue aspirin given DOAC use  -Continue amlodipine 5mg daily. HOLD if SBP<100  -Continue atorvastatin 40mg daily  -Continue apixaban 5mg BID  -CMP, CBC, vitamin D, and vitamin B12 due 5/15/25        Reactive thrombocytosis  Comment: Chronic. Platelets historically in normal range (reviewed last 3 years). Peaked at 700,000, now resolved--Likely  reactive - reviewed flow chart for acute thrombocytosis, posted in note. Normal peripheral smear, liver enzymes  Plan;  -Monitor bleeding risks  -Monitor for worsening s/symptoms of concerns  -CMP, CBC, vitamin D, and vitamin B12 due 5/15/25    51 minutes spent on the date of the encounter doing chart review, history and exam, PMH, documentation and further activities as noted above. Collaboration with nursing staff on site, clinical managers, and therapies were completed on site today.     Electronically signed by:  Dr. Jayshree Montenegro DNP, APRN, FNP-C, WCS-C, EDS-C    Provider reviewed records from facility, and interpreted most recent imaging/lab work, and vital signs.   Acute and chronic conditions managed by writer. Have been reviewed during today's exam.

## 2025-05-12 ENCOUNTER — TRANSITIONAL CARE UNIT VISIT (OUTPATIENT)
Dept: GERIATRICS | Facility: CLINIC | Age: 60
End: 2025-05-12
Payer: COMMERCIAL

## 2025-05-12 ENCOUNTER — DOCUMENTATION ONLY (OUTPATIENT)
Dept: OTHER | Facility: CLINIC | Age: 60
End: 2025-05-12

## 2025-05-12 VITALS
RESPIRATION RATE: 18 BRPM | BODY MASS INDEX: 28.94 KG/M2 | HEART RATE: 70 BPM | DIASTOLIC BLOOD PRESSURE: 63 MMHG | SYSTOLIC BLOOD PRESSURE: 129 MMHG | OXYGEN SATURATION: 98 % | WEIGHT: 184.4 LBS | TEMPERATURE: 97.8 F | HEIGHT: 67 IN

## 2025-05-12 DIAGNOSIS — R53.81 PHYSICAL DECONDITIONING: Primary | ICD-10-CM

## 2025-05-12 DIAGNOSIS — G47.33 OSA (OBSTRUCTIVE SLEEP APNEA): ICD-10-CM

## 2025-05-12 DIAGNOSIS — I26.99 ACUTE PULMONARY EMBOLISM, UNSPECIFIED PULMONARY EMBOLISM TYPE, UNSPECIFIED WHETHER ACUTE COR PULMONALE PRESENT (H): ICD-10-CM

## 2025-05-12 DIAGNOSIS — Z78.9 UNABLE TO CARE FOR SELF: ICD-10-CM

## 2025-05-12 DIAGNOSIS — J43.9 PULMONARY EMPHYSEMA, UNSPECIFIED EMPHYSEMA TYPE (H): ICD-10-CM

## 2025-05-12 DIAGNOSIS — T14.8XXA OPEN WOUND: ICD-10-CM

## 2025-05-12 DIAGNOSIS — M62.81 GENERALIZED MUSCLE WEAKNESS: ICD-10-CM

## 2025-05-12 DIAGNOSIS — I48.0 PAF (PAROXYSMAL ATRIAL FIBRILLATION) (H): ICD-10-CM

## 2025-05-12 DIAGNOSIS — J96.22 ACUTE ON CHRONIC RESPIRATORY FAILURE WITH HYPOXIA AND HYPERCAPNIA (H): ICD-10-CM

## 2025-05-12 DIAGNOSIS — E11.9 TYPE 2 DIABETES, HBA1C GOAL < 7% (H): ICD-10-CM

## 2025-05-12 DIAGNOSIS — Z87.01 HISTORY OF PNEUMONIA: ICD-10-CM

## 2025-05-12 DIAGNOSIS — E78.5 HYPERLIPIDEMIA, UNSPECIFIED HYPERLIPIDEMIA TYPE: ICD-10-CM

## 2025-05-12 DIAGNOSIS — N18.4 CHRONIC KIDNEY DISEASE, STAGE 4 (SEVERE) (H): ICD-10-CM

## 2025-05-12 DIAGNOSIS — A63.0 CONDYLOMA ACUMINATA: ICD-10-CM

## 2025-05-12 DIAGNOSIS — N17.9 AKI (ACUTE KIDNEY INJURY): ICD-10-CM

## 2025-05-12 DIAGNOSIS — I10 ESSENTIAL HYPERTENSION: ICD-10-CM

## 2025-05-12 DIAGNOSIS — L02.215 PERINEAL ABSCESS: ICD-10-CM

## 2025-05-12 DIAGNOSIS — J96.21 ACUTE ON CHRONIC RESPIRATORY FAILURE WITH HYPOXIA AND HYPERCAPNIA (H): ICD-10-CM

## 2025-05-12 DIAGNOSIS — L21.9 SEBORRHEIC DERMATITIS: ICD-10-CM

## 2025-05-12 DIAGNOSIS — F81.9 INTELLECTUAL DELAY: ICD-10-CM

## 2025-05-12 DIAGNOSIS — J44.1 CHRONIC OBSTRUCTIVE PULMONARY DISEASE WITH ACUTE EXACERBATION (H): ICD-10-CM

## 2025-05-12 DIAGNOSIS — E08.42 DIABETIC POLYNEUROPATHY ASSOCIATED WITH DIABETES MELLITUS DUE TO UNDERLYING CONDITION (H): ICD-10-CM

## 2025-05-12 DIAGNOSIS — D75.838 REACTIVE THROMBOCYTOSIS: ICD-10-CM

## 2025-05-12 PROCEDURE — 99310 SBSQ NF CARE HIGH MDM 45: CPT | Performed by: NURSE PRACTITIONER

## 2025-05-12 RX ORDER — AMLODIPINE BESYLATE 5 MG/1
5 TABLET ORAL DAILY
Status: SHIPPED
Start: 2025-05-12

## 2025-05-12 NOTE — LETTER
5/12/2025      Marly Cannon  [address]  [place] [address]        [facility] GERIATRICS    PRIMARY CARE PROVIDER AND CLINIC:  Amberly Whyte, GRISEL, [address] / [place] [address]  Chief Complaint   Patient presents with     Hospital F/U      [facility] Medical Record Number:  [id number]  Place of Service where encounter took place:  [facility] (TCU)(SNF) [[id number]]    Marly Cannon  is a 59 year old  (1965), admitted to the above facility from  [facility] . Hospital stay 4/11/25 through 5/9/25..   HPI:    Brief Course Prior to LTACH:  59 up F with h/o intellectual disability & memory issues, COPD, untreated ADEEL, tobacco use d/o, HTN, pAFib, & poorly controlled T2DM s/p previous hospitalization for DKA (3/18 - 3/20) who presented second time to hospital in [place] on 3/22/2025 with complaints of dyspnea, hyperglycemia, and chest pain.     She was found to have an YADIRA, LLL PE, DKA, and A-fib with RVR. She was started on an insulin drip, therapeutic dose enoxaparin, Zosyn for possible pneumonia, and diltiazem drip for management of her RVR. Her DKA resolved, she was switched from diltiazem to amiodarone drip for management of A-fib with RVR; however, her degree of respiratory failure was still concerning and she was intubated 3/23 for management of worsening hypoxia.   She had a repeat CTA that demonstrated progression of her PE and RLL collapse concerning for mucous plugging and pneumonia.     She was switched from enoxaparin to heparin drip and her antibiotics were broadened to vancomycin and Zosyn. She was on norepinephrine for several days d/t hypotension attributed to sedation & sepsis-related vasoplegia. It appears that she has been doing fairly long SBTs since 3/26, which seemed to be going reasonably well per RT documentation. Primary team has been having issues w/ progressively increasing FiO2 needs. She had a sputum culture  grow Candida & has been on fluconazole since 3/25. Transferred to [facility] d/t concerns that she might require a tracheostomy secondary to her inability to liberate from the vent.     At [facility] patient was admitted to the ICU on a ventilator, successfully extubated 4/1 and downgraded to floor status 4/3.  Infectious disease consulted and continued patient on Zosyn course to be completed through 4/17/2025.    General surgery consulted for right keiry/perineal abscess and performed incision and drainage with Penrose drain placement 3/31 with subsequent repeat I&D 4/2 and 4/8.  Surgery requests wound VAC be changed by wound care Tuesdays and Thursdays.      LTACH Course:  She transferred from [facility] to [facility] for wound cares, therapies, and complex medical care.   4/12-4/14: Needing BiPAP overnight.  4/15-4/20: successfully weaned off NC O2 during the day, completed zosyn 4/17 4/21-4/27- wound is closing well - [facility] plan to remove penrose drain on 4/29/2025. Consider transfer to [facility] for ongoing therapy and wound care.  4/28-5/4:  New information regarding placement needs surfaced early in the week, likely needing group home. Wound vac remains--penrose drain removed--packing to tract along/under R labia. Care conference completed 4/30.     5/5:  slept wih oxymask at 2L overnight.    5/6: Glucose is still elevated will increase glargine to 35 units  5/7:  no overnight events, oxy mask 3L overnight  5/8: increase novolog to 10 unit(s) at meals, increase lantus to 38 unit(s)   Abd xray: mild colonic stool burden, no obstruction or free air. Nephrology consulted to establish care.    The primary encounter diagnosis was Physical deconditioning. Diagnoses of Generalized muscle weakness, Acute on chronic respiratory failure with hypoxia and hypercapnia (H), History of pneumonia, Chronic obstructive pulmonary disease with acute exacerbation (H), ADEEL (obstructive sleep apnea), Perineal  abscess, Condyloma acuminata, Acute pulmonary embolism, unspecified pulmonary embolism type, unspecified whether acute cor pulmonale present (H), Pulmonary emphysema, unspecified emphysema type (H), Type 2 diabetes, HbA1c goal < 7% (H), Diabetic polyneuropathy associated with diabetes mellitus due to underlying condition (H), YADIRA (acute kidney injury), Chronic kidney disease, stage 4 (severe) (H), Essential hypertension, PAF (paroxysmal atrial fibrillation) (H), Hyperlipidemia, unspecified hyperlipidemia type, Unable to care for self, Intellectual delay, Reactive thrombocytosis, Seborrheic dermatitis, and Open wound were also pertinent to this visit.    Met with patient who was found lying in bed with wound vac alarming. Noted area of tegaderm lifting off skin which was causing air leak. Tegaderm was reapplied with relief. She denies any chest pain, palpitations, shortness of breath, RUBY, lightheadedness, dizziness, or cough. Denies any abdominal discomfort. Denies dysuria or frequency. Landrum catheter present with clear yellow urine present. Per chart review, this was placed for wound protection. Patient wondering when this can be removed. Denies loose or constipation. LBM today reported. Appetite good. Sleeping well. Some minimal complaints of pain reported to right groin area with wound vac changes. Mood stable. Nursing denies any acute concerns.     BP Readings from Last 3 Encounters:   05/12/25 129/63   05/09/25 122/67   04/11/25 129/68     Wt Readings from Last 5 Encounters:   05/12/25 83.6 kg (184 lb 6.4 oz)   05/08/25 87.4 kg (192 lb 11.2 oz)   04/11/25 101.3 kg (223 lb 5.2 oz)   03/31/25 109.4 kg (241 lb 2.9 oz)   03/18/25 88.1 kg (194 lb 3.6 oz)     CODE STATUS/ADVANCE DIRECTIVES DISCUSSION:  Full Code  CPR/Full code   ALLERGIES: No Known Allergies   PAST MEDICAL HISTORY:   Past Medical History:   Diagnosis Date     COPD (chronic obstructive pulmonary disease) (H)      Diabetes (H)      Hypertension        PAST SURGICAL HISTORY:   has a past surgical history that includes Colonoscopy (N/A, 08/20/2015); Colonoscopy (N/A, 09/21/2018); Biopsy breast (Left, 02/14/2008); Incision and drainage perineal, combined (N/A, 4/2/2025); and Irrigation and debridement sacral wound, combined (Right, 4/8/2025).  FAMILY HISTORY: family history includes Diabetes in her father, mother, and sister; Hypertension in her brother.  SOCIAL HISTORY:   reports that she has quit smoking. Her smoking use included cigarettes. She started smoking about 46 years ago. She has a 46.4 pack-year smoking history. She has been exposed to tobacco smoke. She has never used smokeless tobacco. She reports that she does not drink alcohol and does not use drugs.  Patient's living condition: lives alone in [facility]. She is wanting to return up to either [place] or [facility] as she has family in [place] [facility]. Several referrals place, but no openings at this time.     Post Discharge Medication Reconciliation Status:   MED REC REQUIRED  Post Medication Reconciliation Status:  Discharge medications reconciled and changed, see notes/orders       Current Outpatient Medications   Medication Sig Dispense Refill     amLODIPine (NORVASC) 5 MG tablet Take 1 tablet (5 mg) by mouth daily. HOLD if SBP<100       Continuous Glucose  (FREESTYLE HANK 2 READER) AISHA 1 each once for 1 dose. Use to read blood sugars per 's instructions. 1 each 0     Continuous Glucose Sensor (FREESTYLE HANK 2 SENSOR) MISC 1 each every 14 days. Use 1 sensor every 14 days. Use to read blood sugars per 's instructions. 6 each 5     insulin aspart (NOVOLOG PEN) 100 UNIT/ML pen Inject 10 Units subcutaneously 3 times daily (with meals). HOLD if Blood Glucose <120       insulin glargine (LANTUS PEN) 100 UNIT/ML pen Inject 38 Units subcutaneously at bedtime. HOLD if Blood Glucose<120       acetaminophen (TYLENOL) 325 MG tablet Take 2 tablets (650 mg) by mouth every 6 hours as  "needed for mild pain or fever.       albuterol (PROVENTIL) (2.5 MG/3ML) 0.083% neb solution Take 1 vial (2.5 mg) by nebulization every 2 hours as needed for shortness of breath.       apixaban ANTICOAGULANT (ELIQUIS) 5 MG tablet Take 1 tablet (5 mg) by mouth 2 times daily.       atorvastatin (LIPITOR) 40 MG tablet TAKE 1 TABLET BY MOUTH AT BEDTIME 90 tablet 2     Cholecalciferol (VITAMIN D3) 50 MCG (2000 UT) CAPS TAKE 1 CAPSULE BY MOUTH DAILY 90 capsule 0     empagliflozin (JARDIANCE) 25 MG TABS tablet Take 1 tablet (25 mg) by mouth daily 90 tablet 3     insulin aspart (NOVOLOG PEN) 100 UNIT/ML pen Inject 1-5 Units subcutaneously at bedtime.       insulin aspart (NOVOLOG PEN) 100 UNIT/ML pen Inject 1-7 Units subcutaneously 3 times daily (with meals).       insulin pen needle (32G X 4 MM) 32G X 4 MM miscellaneous Use 1 pen needles daily 100 each 3     menthol-zinc oxide (CALMOSEPTINE) 0.44-20.6 % OINT ointment Apply topically 4 times daily as needed for skin protection.       multivitamin w/minerals (THERA-VIT-M) tablet Take 1 tablet by mouth daily.       ondansetron (ZOFRAN ODT) 4 MG ODT tab Take 1 tablet (4 mg) by mouth every 6 hours as needed.       OneTouch Delica Lancets 33G MISC Inject 1 each Subcutaneous 3 times daily (before meals) 100 each 11     order for DME Women briefs 50 each 1     polyethylene glycol (MIRALAX) 17 g packet Take 17 g by mouth daily as needed for constipation.       sennosides (SENOKOT) 8.6 MG tablet Take 1 tablet by mouth 2 times daily as needed for constipation.       umeclidinium-vilanterol (ANORO ELLIPTA) 62.5-25 MCG/ACT oral inhaler Inhale 1 puff into the lungs daily.       No current facility-administered medications for this visit.       ROS:  Limited secondary to cognitive impairment but today pt reports as noted per HPI    Vitals:  /63   Pulse 70   Temp 97.8  F (36.6  C)   Resp 18   Ht 1.707 m (5' 7.2\")   Wt 83.6 kg (184 lb 6.4 oz)   SpO2 98%   BMI 28.71 kg/m  "   Exam:  GENERAL APPEARANCE:  Alert, in no distress, cooperative  ENT:  Mouth and posterior oropharynx normal, moist mucous membranes, [place]  EYES:  EOM, conjunctivae, lids, pupils and irises normal  NECK:  No adenopathy,masses or thyromegaly  RESP:  respiratory effort and palpation of chest normal, lungs clear to auscultation , no respiratory distress  CV:  regular rate and rhythm, no murmur, rub, or gallop, peripheral edema 1+ in BLE  ABDOMEN:  normal bowel sounds, soft, nontender, no hepatosplenomegaly or other masses, no guarding or rebound  M/S:   Maximum assistance for all ADLs, transfers and cares. Wheelchair for long distance needs  SKIN:  Inspection of skin and subcutaneous tissue baseline, wound vac C/D/I to right groin  NEURO:   Cranial nerves 2-12 are normal tested and grossly at patient's baseline, no purposeful movement in upper and lower extremities  PSYCH:  oriented X 3, memory impaired , affect and mood normal    Lab/Diagnostic data:  Most Recent 3 CBC's:  Recent Labs   Lab Test 05/09/25  0619 05/08/25  0644 05/05/25  0654 05/05/25  0553 04/30/25  0613 04/29/25  0625   WBC 8.8  --  8.2  --   --  10.4   HGB 11.7  --  11.8 11.1*   < > 12.0   MCV 93  --  96  --   --  93    334 304  --    < > 326    < > = values in this interval not displayed.     Most Recent 3 BMP's:  Recent Labs   Lab Test 05/09/25  1208 05/09/25  0811 05/09/25  0619 05/08/25  0815 05/08/25  0644 05/05/25  0813 05/05/25  0654 05/05/25  0553 04/29/25  0746 04/29/25  0625   NA  --   --  140  --   --   --  142 140   < > 137   POTASSIUM  --   --  4.5  --   --   --  4.3 4.0   < > 4.2   CHLORIDE  --   --  102  --   --   --  103  --   --  99   CO2  --   --  31*  --   --   --  30*  --   --  30*   BUN  --   --  47.4*  --   --   --  49.6*  --   --  43.7*   CR  --   --  1.18*  --  1.30*  --  1.21*  --    < > 1.20*   ANIONGAP  --   --  7  --   --   --  9  --   --  8   NORM  --   --  9.7  --   --   --  9.7  --   --  9.6   * 136* 132*    < >  --    < > 132* 132*   < > 130*    < > = values in this interval not displayed.     Most Recent 2 LFT's:  Recent Labs   Lab Test 05/05/25  0654 04/21/25  0636 04/13/25  0214   AST 15 18 12   ALT 13 13 8   ALKPHOS 87  --  71   BILITOTAL 0.4  --  0.2     Most Recent 3 BNP's:  Recent Labs   Lab Test 03/23/25  1404   NTBNPI 2,201*     Most Recent Cholesterol Panel:  Recent Labs   Lab Test 05/16/24  1313   CHOL 173   LDL 89   HDL 59   TRIG 127     Most Recent TSH and T4:  Recent Labs   Lab Test 03/18/25  1209   TSH 1.18     Most Recent Hemoglobin A1c:  Recent Labs   Lab Test 03/22/25  1731   A1C 12.7*     Most Recent Urinalysis:  Recent Labs   Lab Test 03/31/25  2237   COLOR Light Yellow   APPEARANCE Turbid*   URINEGLC 70*   URINEBILI Negative   URINEKETONE Negative   SG 1.013   UBLD 0.2 mg/dL*   URINEPH 5.0   PROTEIN Negative   NITRITE Negative   LEUKEST 25 Santy/uL*   RBCU 36*   WBCU 3     Most Recent ESR & CRP:  Recent Labs   Lab Test 04/10/25  0514 03/26/25  0324 07/01/19  1130   CRP  --   --  <2.9   CRPI 20.90*   < >  --     < > = values in this interval not displayed.     Most Recent Anemia Panel:  Recent Labs   Lab Test 05/09/25  0619 04/23/25  0623 04/21/25  0814 04/07/25  0542 04/06/25  0431 04/05/25  0451 04/19/23  1510 01/17/23  1505   WBC 8.8   < > 9.7   < > 7.3 7.7   < > 10.1   HGB 11.7   < > 11.2*   < > 10.1* 9.9*   < > 17.1*   HCT 35.4   < > 34.8*   < > 31.2* 32.5*   < > 50.1*   MCV 93   < > 96   < > 94 96   < > 88      < > 650*   < > 282 292   < > 263   IRON  --   --   --   --   --  51  --   --    IRONSAT  --   --   --   --   --  27  --   --    RETICABSCT  --   --  0.116*  --   --   --   --   --    RETP  --   --  3.2*  --   --   --   --   --    FEB  --   --   --   --   --  188*  --   --    OLIVA  --   --   --   --   --  407*  --   --    B12  --   --   --   --   --  859  --   --    FOLIC  --   --   --   --  4.9  --   --   --    EPOE  --   --   --   --   --   --   --  7    < > = values in this  interval not displayed.     Magnesium   Date Value Ref Range Status   05/09/2025 1.9 1.7 - 2.3 mg/dL Final   06/21/2014 1.0 (L) 1.8 - 2.4 mg/dL Final     Vitamin D Deficiency Screening Results:  Lab Results   Component Value Date    VITDT 49 07/19/2023    VITDT 46 01/17/2023    VITDT 45 07/01/2019     ASSESSMENT/PLAN:  Physical deconditioning  Generalized muscle weakness  Unable to care for self  Intellectual delay  Comment: Chronic. S/T prolonged hospitalization  Plan:  -Continue Physical therapy and Occupational therapy as directed  -SW to remain involved for safe discharge planning needs  -Recommend LTC placement post TCU    Acute on Chronic Hypoxic Respiratory Failure  History of VAP  COPD  ADEEL  PE, Acute, segmental, subsegmental  Pulmonary emphysema  Comment: Acute on chronic. Admitted to [facility] on 3/22/25: She had a repeat CTA that demonstrated progression of her PE and RLL collapse concerning for mucous plugging and pneumonia. Primary team has been having issues w/ progressively increasing FiO2 needs. She had a sputum culture grow Candida & has been on fluconazole since 3/25. Transferred to [facility] d/t concerns that she might require a tracheostomy secondary to her inability to liberate from the vent. At [facility] patient was admitted to the ICU on a ventilator, successfully extubated 4/1 and downgraded to floor status 4/3. Infectious disease consulted and continued patient on Zosyn course to be completed through 4/17/2025. Patient was able to liberate from ventilator withOUT tracheostomy formation.   Plan:  -Monitor respiratory status  -Oxygen 3L via oxymask overnights.   -Encourage incentive spirometry every 4 hours while awake  -Continue apixaban 5mg BID  -Continue albuterol nebulization every 2 hour PRN  -Continue umeclidinium-vilanterol (anoro ellipta) 62.5-25mcg inhaler daily  -CMP, CBC, vitamin D, and vitamin B12 due 5/15/25  -Add quantiferon gold testing as this was a  difficult stick today     Perineal Abscess  Condyloma Acuminata  Comment: Acute on chronic. Per recent hospitalization-General surgery consulted for right keiry/perineal abscess and performed incision and drainage with Penrose drain placement 3/31 with subsequent repeat I&D 4/2 and 4/8. s/p acyclovir and I&D x3. Noted on 4/28/25-5/4/25: Wound vac remains--penrose drain removed--packing to tract along/under R labia.   Plan:  -Monitor for worsening s/symptoms of concerns  -Air mattress while on site  -Follow up with general surgery as indicated. Left message with them to see if she needs to follow up with them or if wound clinic needs are warranted at this time. Pending further confirmation  -Continue noguera catheter needs as this time. Recommend to start voiding trial end of week if able.   -Monitor pain complaints  -Continue Tylenol PRN  -Discontinue PRN oxycodone  -CMP, CBC, vitamin D, and vitamin B12 due 5/15/25  -Continue wound care as directed:  *Wound Vac Right Groin: Cleanse w/ Vashe prior to replacing NPWT Setting: -125 Window paned all periwound skin with Vac drape prior to applying sponge and pack into tunnel w/ black foam, then cover inferior prior drain opening w/ VAC Drape. Change Tues/Fridays     Seborrheic dermatitis:   Comment: Acute on chronic. Noted to face PTA  Plan:  -Monitor for worsening s/symptoms of concerns  -Stop topical agents as skin cleared.     T2DM  Poorly controlled as outpatient with A1c 12.7 in march 2025. Used to be on insulin pump. No longer on it due to intellectual delay.  Plan:  -Monitor Blood Glucose QID using Freestyle Bud 2 device. Change device every 14 days and PRN. Update provider if Blood Glucose<70 and.or >450  -Continue insulin aspart novolog sliding scale TID with meals and HS  -Continue jardiance 25mg daily  -Monitor hgb A1c every 3 months. Due June 2025  -Continue insulin aspart 10units TID with meals scheduled. HOLD if Blood Glucose<120  -Continue insulin glargine  38 units at HS. HOLD if Blood Glucose<120  -CMP, CBC, vitamin D, and vitamin B12 due 5/15/25         CKD stage 4  YADIRA  Comment: Chronic. Baseline creatinine~1.1-1.3  Plan:  -Monitor urinary status  -Monitor for worsening s/symptoms of concerns  -CMP, CBC, vitamin D, and vitamin B12 due 5/15/25    Essential hypertension   Hyperlipidemia, unspecified hyperlipidemia type   Comment: Chronic. Based on JNC-8 goals,  patients age of 59 year old, presence of diabetes or CKD, and goals of care goal BP is  <140/90 mm Hg. Patient is stable with current plan of care and routine assessment..  Plan:  -Monitor BP and HR as directed  -Discontinue aspirin given DOAC use  -Continue amlodipine 5mg daily. HOLD if SBP<100  -Continue atorvastatin 40mg daily  -Continue apixaban 5mg BID  -CMP, CBC, vitamin D, and vitamin B12 due 5/15/25        Reactive thrombocytosis  Comment: Chronic. Platelets historically in normal range (reviewed last 3 years). Peaked at 700,000, now resolved--Likely  reactive - reviewed flow chart for acute thrombocytosis, posted in note. Normal peripheral smear, liver enzymes  Plan;  -Monitor bleeding risks  -Monitor for worsening s/symptoms of concerns  -CMP, CBC, vitamin D, and vitamin B12 due 5/15/25    51 minutes spent on the date of the encounter doing chart review, history and exam, PMH, documentation and further activities as noted above. Collaboration with nursing staff on site, clinical managers, and therapies were completed on site today.     Electronically signed by:  Dr. Jayshree Montenegro DNP, APRN, FNP-C, WCS-C, EDS-C    Provider reviewed records from facility, and interpreted most recent imaging/lab work, and vital signs.   Acute and chronic conditions managed by writer. Have been reviewed during today's exam.                             Sincerely,        Jayshree Montenegro, BLAIR CNP    Electronically signed

## 2025-05-13 NOTE — PROGRESS NOTES
"[facility] GERIATRICS    Chief Complaint   Patient presents with    RECHECK     HPI:  Marly Cannon is a 59 year old  (1965), who is being seen today for an episodic care visit at: [facility] (TCU)([facility]) [[id number]]. Today's concern is: The primary encounter diagnosis was Physical deconditioning. Diagnoses of Generalized muscle weakness, Acute on chronic respiratory failure with hypoxia and hypercapnia (H), History of pneumonia, Chronic obstructive pulmonary disease with acute exacerbation (H), ADEEL (obstructive sleep apnea), Perineal abscess, Condyloma acuminata, Acute pulmonary embolism, unspecified pulmonary embolism type, unspecified whether acute cor pulmonale present (H), Pulmonary emphysema, unspecified emphysema type (H), Type 2 diabetes, HbA1c goal < 7% (H), Diabetic polyneuropathy associated with diabetes mellitus due to underlying condition (H), YADIRA (acute kidney injury), Chronic kidney disease, stage 4 (severe) (H), Essential hypertension, PAF (paroxysmal atrial fibrillation) (H), Hyperlipidemia, unspecified hyperlipidemia type, Unable to care for self, Intellectual delay, Seborrheic dermatitis, Reactive thrombocytosis, and Open wound were also pertinent to this visit.    Met with patient who was found sitting upright in wheelchair completing her breakfast. She is alert but forgetful. She reports \"I am leaving and going home end of week right.\" Reminded her once again that all referrals that were done for her to return back to areas near [facility] were denied. Still in process of referrals, but have not heard anything yet in terms of acceptance. SW involved.     She denies any chest pain, palpitations, shortness of breath, RUBY, lightheadedness, dizziness, or cough. Denies any abdominal discomfort. Denies N&V. Denies dysuria or frequency. Landrum catheter present and per chart review was placed due to wound. Urine is clear and yellow today. Denies loose or " "constipation. Appetite good. Sleeping well. No complaints of pain. Mood stable. Nursing denies any acute concerns.     BP Readings from Last 3 Encounters:   05/14/25 (!) 140/71   05/12/25 129/63   05/09/25 122/67     Wt Readings from Last 5 Encounters:   05/14/25 83.6 kg (184 lb 6.4 oz)   05/12/25 83.6 kg (184 lb 6.4 oz)   05/08/25 87.4 kg (192 lb 11.2 oz)   04/11/25 101.3 kg (223 lb 5.2 oz)   03/31/25 109.4 kg (241 lb 2.9 oz)     Allergies, and PMH/PSH reviewed in EPIC today.  REVIEW OF SYSTEMS:  Limited secondary to cognitive impairment but today pt reports as noted per HPI    Objective:   BP (!) 140/71   Pulse 70   Temp 98.2  F (36.8  C)   Resp 18   Ht 1.651 m (5' 5\")   Wt 83.6 kg (184 lb 6.4 oz)   SpO2 97%   BMI 30.69 kg/m    GENERAL APPEARANCE:  Alert, in no distress, cooperative  ENT:  Mouth and posterior oropharynx normal, moist mucous membranes, [place]  EYES:  EOM, conjunctivae, lids, pupils and irises normal  NECK:  No adenopathy,masses or thyromegaly  RESP:  respiratory effort and palpation of chest normal, lungs clear to auscultation , no respiratory distress  CV:  regular rate and rhythm, no murmur, rub, or gallop, peripheral edema 1+ in BLE  ABDOMEN:  normal bowel sounds, soft, nontender, no hepatosplenomegaly or other masses, no guarding or rebound  M/S:   Ambulates with walker. Staff to assist for ADLs, transfers and cares  SKIN:  Inspection of skin and subcutaneous tissue baseline, wound healing well, no signs of infection see photo  NEURO:   Cranial nerves 2-12 are normal tested and grossly at patient's baseline, no purposeful movement in upper and lower extremities  PSYCH:  oriented to self and place, insight and judgement impaired, memory impaired , affect and mood normal        Labs done in SNF are in [facility] EPIC. Please refer to them using EPIC/Care Everywhere. and Recent labs in [facility] reviewed by me today.     ASSESSMENT/PLAN:  Physical deconditioning  Generalized muscle weakness  Unable " to care for self  Intellectual delay  Comment: Chronic. S/T prolonged hospitalization. Per therapy- Patient requires set up for UB needs, ModA for LB needs. Ambulates up to 150ft 2WW CGA.   Plan:  -Continue Physical therapy and Occupational therapy as directed  -SW to remain involved for safe discharge planning needs  -Recommend LTC placement post TCU     Acute on Chronic Hypoxic Respiratory Failure  History of VAP  COPD  ADEEL  PE, Acute, segmental, subsegmental  Pulmonary emphysema  Comment: Acute on chronic. Admitted to [facility] on 3/22/25: She had a repeat CTA that demonstrated progression of her PE and RLL collapse concerning for mucous plugging and pneumonia. Primary team has been having issues w/ progressively increasing FiO2 needs. She had a sputum culture grow Candida & has been on fluconazole since 3/25. Transferred to [facility] d/t concerns that she might require a tracheostomy secondary to her inability to liberate from the vent. At [facility] patient was admitted to the ICU on a ventilator, successfully extubated 4/1 and downgraded to floor status 4/3. Infectious disease consulted and continued patient on Zosyn course to be completed through 4/17/2025. Patient was able to liberate from ventilator withOUT tracheostomy formation.   Plan:  -Monitor respiratory status  -Oxygen 3L via oxymask overnights.   -Encourage incentive spirometry every 4 hours while awake  -Continue apixaban 5mg BID  -Continue albuterol nebulization every 2 hour PRN  -Continue umeclidinium-vilanterol (anoro ellipta) 62.5-25mcg inhaler daily  -CMP, CBC, vitamin D, and vitamin B12 due 5/15/25  -Add quantiferon gold testing as this was a difficult stick today     Perineal Abscess  Condyloma Acuminata  Comment: Acute on chronic. Per recent hospitalization-General surgery consulted for right keiry/perineal abscess and performed incision and drainage with Penrose drain placement 3/31 with subsequent repeat I&D 4/2  and 4/8. s/p acyclovir and I&D x3. Noted on 4/28/25-5/4/25: Wound vac remains--penrose drain removed--packing to tract along/under R labia.   Plan:  -Monitor for worsening s/symptoms of concerns  -Air mattress while on site  -Follow up with wound clinic as directed. New referral placed today--pending appt  -Continue noguera catheter needs as this time. Recommend to start voiding trial end of week if able.   -Monitor pain complaints  -Continue Tylenol PRN  -CMP, CBC, vitamin D, and vitamin B12 due 5/15/25  -Continue wound care as directed:  *Wound Vac Right Groin: Cleanse w/ Vashe prior to replacing NPWT Setting: -125 Window paned all periwound skin with Vac drape prior to applying sponge and pack into tunnel w/ black foam, then cover inferior prior drain opening w/ VAC Drape. Change Tues/Fridays     Seborrheic dermatitis:   Comment: Acute on chronic. Noted to face PTA  Plan:  -Monitor for worsening s/symptoms of concerns  -Stop topical agents as skin cleared.      T2DM  Poorly controlled as outpatient with A1c 12.7 in march 2025. Used to be on insulin pump. No longer on it due to intellectual delay.  Plan:  -Monitor Blood Glucose QID using Freestyle Bud 2 device. Change device every 14 days and PRN. Update provider if Blood Glucose<70 and.or >450  -Continue insulin aspart novolog sliding scale TID with meals and HS  -Continue jardiance 25mg daily  -Monitor hgb A1c every 3 months. Due June 2025  -Continue insulin aspart 10units TID with meals scheduled. HOLD if Blood Glucose<120  -Continue insulin glargine 38 units at HS. HOLD if Blood Glucose<120  -CMP, CBC, vitamin D, and vitamin B12 due 5/15/25     CKD stage 4  YADIRA  Comment: Chronic. Baseline creatinine~1.1-1.3  Plan:  -Monitor urinary status  -Monitor for worsening s/symptoms of concerns  -CMP, CBC, vitamin D, and vitamin B12 due 5/15/25     Essential hypertension   Hyperlipidemia, unspecified hyperlipidemia type   Comment: Chronic. Based on JNC-8 goals,   patients age of 59 year old, presence of diabetes or CKD, and goals of care goal BP is  <140/90 mm Hg. Patient is stable with current plan of care and routine assessment..  Plan:  -Monitor BP and HR as directed  -Continue amlodipine 5mg daily. HOLD if SBP<100  -Continue atorvastatin 40mg daily  -Continue apixaban 5mg BID  -CMP, CBC, vitamin D, and vitamin B12 due 5/15/25     Reactive thrombocytosis  Comment: Chronic. Platelets historically in normal range (reviewed last 3 years). Peaked at 700,000, now resolved--Likely  reactive - reviewed flow chart for acute thrombocytosis, posted in note. Normal peripheral smear, liver enzymes  Plan;  -Monitor bleeding risks  -Monitor for worsening s/symptoms of concerns  -CMP, CBC, vitamin D, and vitamin B12 due 5/15/25     Electronically signed by:  Dr. Jayshree Montenegro DNP, APRN, FNP-C, WCS-C, EDS-C     Provider reviewed records from facility, and interpreted most recent imaging/lab work, and vital signs.   Acute and chronic conditions managed by writer. Have been reviewed during today's exam.

## 2025-05-14 ENCOUNTER — LAB REQUISITION (OUTPATIENT)
Dept: LAB | Facility: CLINIC | Age: 60
End: 2025-05-14
Payer: COMMERCIAL

## 2025-05-14 ENCOUNTER — TELEPHONE (OUTPATIENT)
Dept: VASCULAR SURGERY | Facility: CLINIC | Age: 60
End: 2025-05-14

## 2025-05-14 ENCOUNTER — TRANSITIONAL CARE UNIT VISIT (OUTPATIENT)
Dept: GERIATRICS | Facility: CLINIC | Age: 60
End: 2025-05-14
Payer: COMMERCIAL

## 2025-05-14 VITALS
TEMPERATURE: 98.2 F | WEIGHT: 184.4 LBS | SYSTOLIC BLOOD PRESSURE: 140 MMHG | OXYGEN SATURATION: 97 % | HEIGHT: 65 IN | HEART RATE: 70 BPM | BODY MASS INDEX: 30.72 KG/M2 | DIASTOLIC BLOOD PRESSURE: 71 MMHG | RESPIRATION RATE: 18 BRPM

## 2025-05-14 DIAGNOSIS — I10 ESSENTIAL (PRIMARY) HYPERTENSION: ICD-10-CM

## 2025-05-14 DIAGNOSIS — E78.5 HYPERLIPIDEMIA, UNSPECIFIED HYPERLIPIDEMIA TYPE: ICD-10-CM

## 2025-05-14 DIAGNOSIS — F81.9 INTELLECTUAL DELAY: ICD-10-CM

## 2025-05-14 DIAGNOSIS — I10 ESSENTIAL HYPERTENSION: ICD-10-CM

## 2025-05-14 DIAGNOSIS — L02.215 PERINEAL ABSCESS: ICD-10-CM

## 2025-05-14 DIAGNOSIS — Z87.01 HISTORY OF PNEUMONIA: ICD-10-CM

## 2025-05-14 DIAGNOSIS — A63.0 CONDYLOMA ACUMINATA: ICD-10-CM

## 2025-05-14 DIAGNOSIS — Z78.9 UNABLE TO CARE FOR SELF: ICD-10-CM

## 2025-05-14 DIAGNOSIS — R53.81 PHYSICAL DECONDITIONING: Primary | ICD-10-CM

## 2025-05-14 DIAGNOSIS — G47.33 OSA (OBSTRUCTIVE SLEEP APNEA): ICD-10-CM

## 2025-05-14 DIAGNOSIS — N18.30 CHRONIC KIDNEY DISEASE, STAGE 3 UNSPECIFIED (H): ICD-10-CM

## 2025-05-14 DIAGNOSIS — I26.99 ACUTE PULMONARY EMBOLISM, UNSPECIFIED PULMONARY EMBOLISM TYPE, UNSPECIFIED WHETHER ACUTE COR PULMONALE PRESENT (H): ICD-10-CM

## 2025-05-14 DIAGNOSIS — E11.9 TYPE 2 DIABETES, HBA1C GOAL < 7% (H): ICD-10-CM

## 2025-05-14 DIAGNOSIS — J44.1 CHRONIC OBSTRUCTIVE PULMONARY DISEASE WITH ACUTE EXACERBATION (H): ICD-10-CM

## 2025-05-14 DIAGNOSIS — L21.9 SEBORRHEIC DERMATITIS: ICD-10-CM

## 2025-05-14 DIAGNOSIS — D75.838 REACTIVE THROMBOCYTOSIS: ICD-10-CM

## 2025-05-14 DIAGNOSIS — E08.42 DIABETIC POLYNEUROPATHY ASSOCIATED WITH DIABETES MELLITUS DUE TO UNDERLYING CONDITION (H): ICD-10-CM

## 2025-05-14 DIAGNOSIS — I48.0 PAF (PAROXYSMAL ATRIAL FIBRILLATION) (H): ICD-10-CM

## 2025-05-14 DIAGNOSIS — N18.4 CHRONIC KIDNEY DISEASE, STAGE 4 (SEVERE) (H): ICD-10-CM

## 2025-05-14 DIAGNOSIS — J43.9 PULMONARY EMPHYSEMA, UNSPECIFIED EMPHYSEMA TYPE (H): ICD-10-CM

## 2025-05-14 DIAGNOSIS — E46 UNSPECIFIED PROTEIN-CALORIE MALNUTRITION: ICD-10-CM

## 2025-05-14 DIAGNOSIS — T14.8XXA OPEN WOUND: ICD-10-CM

## 2025-05-14 DIAGNOSIS — J96.22 ACUTE ON CHRONIC RESPIRATORY FAILURE WITH HYPOXIA AND HYPERCAPNIA (H): ICD-10-CM

## 2025-05-14 DIAGNOSIS — D64.9 ANEMIA, UNSPECIFIED: ICD-10-CM

## 2025-05-14 DIAGNOSIS — M62.81 GENERALIZED MUSCLE WEAKNESS: ICD-10-CM

## 2025-05-14 DIAGNOSIS — J96.21 ACUTE ON CHRONIC RESPIRATORY FAILURE WITH HYPOXIA AND HYPERCAPNIA (H): ICD-10-CM

## 2025-05-14 DIAGNOSIS — E55.9 VITAMIN D DEFICIENCY, UNSPECIFIED: ICD-10-CM

## 2025-05-14 DIAGNOSIS — N17.9 AKI (ACUTE KIDNEY INJURY): ICD-10-CM

## 2025-05-14 NOTE — LETTER
" 5/14/2025      Marly Cannon  [address]  [place] [address]        [facility] GERIATRICS    Chief Complaint   Patient presents with     RECHECK     HPI:  Marly Cannon is a 59 year old  (1965), who is being seen today for an episodic care visit at: [facility] ([facility]U)([facility]) [[id number]]. Today's concern is: The primary encounter diagnosis was Physical deconditioning. Diagnoses of Generalized muscle weakness, Acute on chronic respiratory failure with hypoxia and hypercapnia (H), History of pneumonia, Chronic obstructive pulmonary disease with acute exacerbation (H), ADEEL (obstructive sleep apnea), Perineal abscess, Condyloma acuminata, Acute pulmonary embolism, unspecified pulmonary embolism type, unspecified whether acute cor pulmonale present (H), Pulmonary emphysema, unspecified emphysema type (H), Type 2 diabetes, HbA1c goal < 7% (H), Diabetic polyneuropathy associated with diabetes mellitus due to underlying condition (H), YADIRA (acute kidney injury), Chronic kidney disease, stage 4 (severe) (H), Essential hypertension, PAF (paroxysmal atrial fibrillation) (H), Hyperlipidemia, unspecified hyperlipidemia type, Unable to care for self, Intellectual delay, Seborrheic dermatitis, Reactive thrombocytosis, and Open wound were also pertinent to this visit.    Met with patient who was found sitting upright in wheelchair completing her breakfast. She is alert but forgetful. She reports \"I am leaving and going home end of week right.\" Reminded her once again that all referrals that were done for her to return back to areas near [place] were denied. Still in process of referrals, but have not heard anything yet in terms of acceptance. SW involved.     She denies any chest pain, palpitations, shortness of breath, RUBY, lightheadedness, dizziness, or cough. Denies any abdominal discomfort. Denies N&V. Denies dysuria or frequency. Landrum catheter present and per chart " "review was placed due to wound. Urine is clear and yellow today. Denies loose or constipation. Appetite good. Sleeping well. No complaints of pain. Mood stable. Nursing denies any acute concerns.     BP Readings from Last 3 Encounters:   05/14/25 (!) 140/71   05/12/25 129/63   05/09/25 122/67     Wt Readings from Last 5 Encounters:   05/14/25 83.6 kg (184 lb 6.4 oz)   05/12/25 83.6 kg (184 lb 6.4 oz)   05/08/25 87.4 kg (192 lb 11.2 oz)   04/11/25 101.3 kg (223 lb 5.2 oz)   03/31/25 109.4 kg (241 lb 2.9 oz)     Allergies, and PMH/PSH reviewed in EPIC today.  REVIEW OF SYSTEMS:  Limited secondary to cognitive impairment but today pt reports as noted per HPI    Objective:   BP (!) 140/71   Pulse 70   Temp 98.2  F (36.8  C)   Resp 18   Ht 1.651 m (5' 5\")   Wt 83.6 kg (184 lb 6.4 oz)   SpO2 97%   BMI 30.69 kg/m    GENERAL APPEARANCE:  Alert, in no distress, cooperative  ENT:  Mouth and posterior oropharynx normal, moist mucous membranes, [place]  EYES:  EOM, conjunctivae, lids, pupils and irises normal  NECK:  No adenopathy,masses or thyromegaly  RESP:  respiratory effort and palpation of chest normal, lungs clear to auscultation , no respiratory distress  CV:  regular rate and rhythm, no murmur, rub, or gallop, peripheral edema 1+ in BLE  ABDOMEN:  normal bowel sounds, soft, nontender, no hepatosplenomegaly or other masses, no guarding or rebound  M/S:   Ambulates with walker. Staff to assist for ADLs, transfers and cares  SKIN:  Inspection of skin and subcutaneous tissue baseline, wound healing well, no signs of infection see photo  NEURO:   Cranial nerves 2-12 are normal tested and grossly at patient's baseline, no purposeful movement in upper and lower extremities  PSYCH:  oriented to self and place, insight and judgement impaired, memory impaired , affect and mood normal        Labs done in SNF are in [facility]. Please refer to them using [employer]/Care Everywhere. and Recent labs in [facility] reviewed by me today. "     ASSESSMENT/PLAN:  Physical deconditioning  Generalized muscle weakness  Unable to care for self  Intellectual delay  Comment: Chronic. S/T prolonged hospitalization. Per therapy- Patient requires set up for UB needs, ModA for LB needs. Ambulates up to 150ft 2WW CGA.   Plan:  -Continue Physical therapy and Occupational therapy as directed  -SW to remain involved for safe discharge planning needs  -Recommend LTC placement post TCU     Acute on Chronic Hypoxic Respiratory Failure  History of VAP  COPD  ADEEL  PE, Acute, segmental, subsegmental  Pulmonary emphysema  Comment: Acute on chronic. Admitted to [facility] on 3/22/25: She had a repeat CTA that demonstrated progression of her PE and RLL collapse concerning for mucous plugging and pneumonia. Primary team has been having issues w/ progressively increasing FiO2 needs. She had a sputum culture grow Candida & has been on fluconazole since 3/25. Transferred to [facility] d/t concerns that she might require a tracheostomy secondary to her inability to liberate from the vent. At [facility] patient was admitted to the ICU on a ventilator, successfully extubated 4/1 and downgraded to floor status 4/3. Infectious disease consulted and continued patient on Zosyn course to be completed through 4/17/2025. Patient was able to liberate from ventilator withOUT tracheostomy formation.   Plan:  -Monitor respiratory status  -Oxygen 3L via oxymask overnights.   -Encourage incentive spirometry every 4 hours while awake  -Continue apixaban 5mg BID  -Continue albuterol nebulization every 2 hour PRN  -Continue umeclidinium-vilanterol (anoro ellipta) 62.5-25mcg inhaler daily  -CMP, CBC, vitamin D, and vitamin B12 due 5/15/25  -Add quantiferon gold testing as this was a difficult stick today     Perineal Abscess  Condyloma Acuminata  Comment: Acute on chronic. Per recent hospitalization-General surgery consulted for right keiry/perineal abscess and performed  incision and drainage with Penrose drain placement 3/31 with subsequent repeat I&D 4/2 and 4/8. s/p acyclovir and I&D x3. Noted on 4/28/25-5/4/25: Wound vac remains--penrose drain removed--packing to tract along/under R labia.   Plan:  -Monitor for worsening s/symptoms of concerns  -Air mattress while on site  -Follow up with wound clinic as directed. New referral placed today--pending appt  -Continue noguera catheter needs as this time. Recommend to start voiding trial end of week if able.   -Monitor pain complaints  -Continue Tylenol PRN  -CMP, CBC, vitamin D, and vitamin B12 due 5/15/25  -Continue wound care as directed:  *Wound Vac Right Groin: Cleanse w/ Vashe prior to replacing NPWT Setting: -125 Window paned all periwound skin with Vac drape prior to applying sponge and pack into tunnel w/ black foam, then cover inferior prior drain opening w/ VAC Drape. Change Tues/Fridays     Seborrheic dermatitis:   Comment: Acute on chronic. Noted to face PTA  Plan:  -Monitor for worsening s/symptoms of concerns  -Stop topical agents as skin cleared.      T2DM  Poorly controlled as outpatient with A1c 12.7 in march 2025. Used to be on insulin pump. No longer on it due to intellectual delay.  Plan:  -Monitor Blood Glucose QID using Freestyle Bud 2 device. Change device every 14 days and PRN. Update provider if Blood Glucose<70 and.or >450  -Continue insulin aspart novolog sliding scale TID with meals and HS  -Continue jardiance 25mg daily  -Monitor hgb A1c every 3 months. Due June 2025  -Continue insulin aspart 10units TID with meals scheduled. HOLD if Blood Glucose<120  -Continue insulin glargine 38 units at HS. HOLD if Blood Glucose<120  -CMP, CBC, vitamin D, and vitamin B12 due 5/15/25     CKD stage 4  YADIRA  Comment: Chronic. Baseline creatinine~1.1-1.3  Plan:  -Monitor urinary status  -Monitor for worsening s/symptoms of concerns  -CMP, CBC, vitamin D, and vitamin B12 due 5/15/25     Essential hypertension    Hyperlipidemia, unspecified hyperlipidemia type   Comment: Chronic. Based on JNC-8 goals,  patients age of 59 year old, presence of diabetes or CKD, and goals of care goal BP is  <140/90 mm Hg. Patient is stable with current plan of care and routine assessment..  Plan:  -Monitor BP and HR as directed  -Continue amlodipine 5mg daily. HOLD if SBP<100  -Continue atorvastatin 40mg daily  -Continue apixaban 5mg BID  -CMP, CBC, vitamin D, and vitamin B12 due 5/15/25     Reactive thrombocytosis  Comment: Chronic. Platelets historically in normal range (reviewed last 3 years). Peaked at 700,000, now resolved--Likely  reactive - reviewed flow chart for acute thrombocytosis, posted in note. Normal peripheral smear, liver enzymes  Plan;  -Monitor bleeding risks  -Monitor for worsening s/symptoms of concerns  -CMP, CBC, vitamin D, and vitamin B12 due 5/15/25     Electronically signed by:  Dr. Jayshree Montenegro DNP, APRN, FNP-C, WCS-C, EDS-C     Provider reviewed records from facility, and interpreted most recent imaging/lab work, and vital signs.   Acute and chronic conditions managed by writer. Have been reviewed during today's exam.         Sincerely,        Jayshree Montenegro, BLAIR CNP    Electronically signed

## 2025-05-14 NOTE — TELEPHONE ENCOUNTER
Vascular Referral Intake    Appointment note (to be pasted into appt note. Also add where additional info is located ie: outside images pushed to PACS, in Epic, sent to HIM, etc):   Referred by Jayshree Montenegro APRN CNP for post surgical wound (right groin wound s/p I&D 4/8/25)    Specialty: Wound Clinic    Specific Provider if Necessary:  Jessica Tapia NP, Piper Suazo NP, or Dr. Hema Rob    Visit Type: New    Time Frame: Next Available    Testing/Imaging Needed Before Consult: no    Liane or bed needed: Yes - groin wound (at least needs bed, please ask about liane); NO BED/LIANE BACK TO BACK. NO MORE THAN 4 BED/LIANE/DAY.      *Schedulers: Please send welcome letter to patient after appointment(s) scheduled*    denies

## 2025-05-15 VITALS
OXYGEN SATURATION: 98 % | WEIGHT: 184.4 LBS | HEIGHT: 65 IN | DIASTOLIC BLOOD PRESSURE: 62 MMHG | BODY MASS INDEX: 30.72 KG/M2 | SYSTOLIC BLOOD PRESSURE: 116 MMHG | TEMPERATURE: 96.6 F | RESPIRATION RATE: 18 BRPM | HEART RATE: 78 BPM

## 2025-05-15 LAB
ALBUMIN SERPL BCG-MCNC: 3.8 G/DL (ref 3.5–5.2)
ALP SERPL-CCNC: 96 U/L (ref 40–150)
ALT SERPL W P-5'-P-CCNC: 11 U/L (ref 0–50)
ANION GAP SERPL CALCULATED.3IONS-SCNC: 14 MMOL/L (ref 7–15)
AST SERPL W P-5'-P-CCNC: 24 U/L (ref 0–45)
BASOPHILS # BLD AUTO: 0.1 10E3/UL (ref 0–0.2)
BASOPHILS NFR BLD AUTO: 1 %
BILIRUB SERPL-MCNC: 0.6 MG/DL
BUN SERPL-MCNC: 48.6 MG/DL (ref 8–23)
CALCIUM SERPL-MCNC: 10 MG/DL (ref 8.8–10.4)
CHLORIDE SERPL-SCNC: 98 MMOL/L (ref 98–107)
CREAT SERPL-MCNC: 1.25 MG/DL (ref 0.51–0.95)
EGFRCR SERPLBLD CKD-EPI 2021: 49 ML/MIN/1.73M2
EOSINOPHIL # BLD AUTO: 0.5 10E3/UL (ref 0–0.7)
EOSINOPHIL NFR BLD AUTO: 5 %
ERYTHROCYTE [DISTWIDTH] IN BLOOD BY AUTOMATED COUNT: 13.8 % (ref 10–15)
GLUCOSE SERPL-MCNC: 164 MG/DL (ref 70–99)
HCO3 SERPL-SCNC: 25 MMOL/L (ref 22–29)
HCT VFR BLD AUTO: 39.2 % (ref 35–47)
HGB BLD-MCNC: 12.5 G/DL (ref 11.7–15.7)
IMM GRANULOCYTES # BLD: 0 10E3/UL
IMM GRANULOCYTES NFR BLD: 0 %
LYMPHOCYTES # BLD AUTO: 3 10E3/UL (ref 0.8–5.3)
LYMPHOCYTES NFR BLD AUTO: 28 %
MCH RBC QN AUTO: 30 PG (ref 26.5–33)
MCHC RBC AUTO-ENTMCNC: 31.9 G/DL (ref 31.5–36.5)
MCV RBC AUTO: 94 FL (ref 78–100)
MONOCYTES # BLD AUTO: 0.8 10E3/UL (ref 0–1.3)
MONOCYTES NFR BLD AUTO: 8 %
NEUTROPHILS # BLD AUTO: 6.2 10E3/UL (ref 1.6–8.3)
NEUTROPHILS NFR BLD AUTO: 58 %
NRBC # BLD AUTO: 0 10E3/UL
NRBC BLD AUTO-RTO: 0 /100
PLATELET # BLD AUTO: 425 10E3/UL (ref 150–450)
POTASSIUM SERPL-SCNC: 4.4 MMOL/L (ref 3.4–5.3)
PROT SERPL-MCNC: 7.6 G/DL (ref 6.4–8.3)
RBC # BLD AUTO: 4.16 10E6/UL (ref 3.8–5.2)
SODIUM SERPL-SCNC: 137 MMOL/L (ref 135–145)
VIT B12 SERPL-MCNC: 559 PG/ML (ref 232–1245)
VIT D+METAB SERPL-MCNC: 46 NG/ML (ref 20–50)
WBC # BLD AUTO: 10.6 10E3/UL (ref 4–11)

## 2025-05-15 PROCEDURE — 82607 VITAMIN B-12: CPT | Mod: ORL | Performed by: NURSE PRACTITIONER

## 2025-05-15 PROCEDURE — 80053 COMPREHEN METABOLIC PANEL: CPT | Mod: ORL | Performed by: NURSE PRACTITIONER

## 2025-05-15 PROCEDURE — 85025 COMPLETE CBC W/AUTO DIFF WBC: CPT | Mod: ORL | Performed by: NURSE PRACTITIONER

## 2025-05-15 PROCEDURE — 36415 COLL VENOUS BLD VENIPUNCTURE: CPT | Mod: ORL | Performed by: NURSE PRACTITIONER

## 2025-05-15 PROCEDURE — 86481 TB AG RESPONSE T-CELL SUSP: CPT | Mod: ORL | Performed by: NURSE PRACTITIONER

## 2025-05-15 PROCEDURE — 82306 VITAMIN D 25 HYDROXY: CPT | Mod: ORL | Performed by: NURSE PRACTITIONER

## 2025-05-15 NOTE — PROGRESS NOTES
"[facility] GERIATRICS    Chief Complaint   Patient presents with    RECHECK     HPI:  Marly Cannon is a 59 year old  (1965), who is being seen today for an episodic care visit at: [facility] ([facility])([facility]) [[id number]]. Today's concern is: The primary encounter diagnosis was Physical deconditioning. Diagnoses of Generalized muscle weakness, Acute on chronic respiratory failure with hypoxia and hypercapnia (H), History of pneumonia, Chronic obstructive pulmonary disease with acute exacerbation (H), ADEEL (obstructive sleep apnea), Perineal abscess, Condyloma acuminata, Acute pulmonary embolism, unspecified pulmonary embolism type, unspecified whether acute cor pulmonale present (H), Pulmonary emphysema, unspecified emphysema type (H), Type 2 diabetes, HbA1c goal < 7% (H), Diabetic polyneuropathy associated with diabetes mellitus due to underlying condition (H), YADIRA (acute kidney injury), Chronic kidney disease, stage 4 (severe) (H), Essential hypertension, PAF (paroxysmal atrial fibrillation) (H), Hyperlipidemia, unspecified hyperlipidemia type, Unable to care for self, Intellectual delay, Seborrheic dermatitis, Reactive thrombocytosis, and Open wound were also pertinent to this visit.    ***    BP Readings from Last 3 Encounters:   05/15/25 116/62   05/14/25 (!) 140/71   05/12/25 129/63     Wt Readings from Last 5 Encounters:   05/15/25 83.6 kg (184 lb 6.4 oz)   05/14/25 83.6 kg (184 lb 6.4 oz)   05/12/25 83.6 kg (184 lb 6.4 oz)   05/08/25 87.4 kg (192 lb 11.2 oz)   04/11/25 101.3 kg (223 lb 5.2 oz)     Allergies, and PMH/PSH reviewed in EPIC today.  REVIEW OF SYSTEMS:  Limited secondary to cognitive impairment but today pt reports as noted per HPI    Objective:   /62   Pulse 78   Temp (!) 96.6  F (35.9  C)   Resp 18   Ht 1.651 m (5' 5\")   Wt 83.6 kg (184 lb 6.4 oz)   SpO2 98%   BMI 30.69 kg/m    GENERAL APPEARANCE:  {halfway GENERAL APPEARANCE:360198}  ENT:  " "{[facility] ENT PE:942484::\"Mouth and posterior oropharynx normal, moist mucous membranes\"}  EYES:  {[facility] EYES PE :318078::\"EOM, conjunctivae, lids, pupils and irises normal\"}  NECK:  {[facility] NECK PE:402325::\"No adenopathy,masses or thyromegaly\"}  RESP:  {[facility] RESP PE:820545}  CV:  {[facility] CV PE:570800}  ABDOMEN:  {[facility] GI PE:084948::\"normal bowel sounds, soft, nontender, no hepatosplenomegaly or other masses\"}  M/S:   {[facility] M/S PE:282731}  SKIN:  {[facility] SKIN PE:045398}  NEURO:   {[facility] NEURO PE:941171}  PSYCH:  {[facility] PSYCH PE:103299}    Most Recent 3 CBC's:  Recent Labs   Lab Test 05/15/25  0850 05/09/25  0619 05/08/25  0644 05/05/25  0654   WBC 10.6 8.8  --  8.2   HGB 12.5 11.7  --  11.8   MCV 94 93  --  96    380 334 304     Most Recent 3 BMP's:  Recent Labs   Lab Test 05/15/25  0850 05/09/25  1208 05/09/25  0811 05/09/25  0619 05/08/25  0815 05/08/25  0644 05/05/25  0813 05/05/25  0654     --   --  140  --   --   --  142   POTASSIUM 4.4  --   --  4.5  --   --   --  4.3   CHLORIDE 98  --   --  102  --   --   --  103   CO2 25  --   --  31*  --   --   --  30*   BUN 48.6*  --   --  47.4*  --   --   --  49.6*   CR 1.25*  --   --  1.18*  --  1.30*  --  1.21*   ANIONGAP 14  --   --  7  --   --   --  9   NORM 10.0  --   --  9.7  --   --   --  9.7   * 208* 136* 132*   < >  --    < > 132*    < > = values in this interval not displayed.     Most Recent 2 LFT's:  Recent Labs   Lab Test 05/15/25  0850 05/05/25  0654   AST 24 15   ALT 11 13   ALKPHOS 96 87   BILITOTAL 0.6 0.4     Most Recent Anemia Panel:  Recent Labs   Lab Test 05/15/25  0850 04/23/25  0623 04/21/25  0814 04/07/25  0542 04/06/25  0431 04/05/25  0451 04/19/23  1510 01/17/23  1505   WBC 10.6   < > 9.7   < > 7.3 7.7   < > 10.1   HGB 12.5   < > 11.2*   < > 10.1* 9.9*   < > 17.1*   HCT 39.2   < > 34.8*   < > 31.2* 32.5*   < > 50.1*   MCV 94   < > 96   < > 94 96   < > 88   PLT " 425   < > 650*   < > 282 292   < > 263   IRON  --   --   --   --   --  51  --   --    IRONSAT  --   --   --   --   --  27  --   --    RETICABSCT  --   --  0.116*  --   --   --   --   --    RETP  --   --  3.2*  --   --   --   --   --    FEB  --   --   --   --   --  188*  --   --    OLIVA  --   --   --   --   --  407*  --   --    B12 559  --   --   --   --  859   < >  --    FOLIC  --   --   --   --  4.9  --   --   --    EPOE  --   --   --   --   --   --   --  7    < > = values in this interval not displayed.     Vitamin D Deficiency Screening Results:  Lab Results   Component Value Date    VITDT 46 05/15/2025    VITDT 49 07/19/2023    VITDT 46 01/17/2023    VITDT 45 07/01/2019     ASSESSMENT/PLAN:  Physical deconditioning  Generalized muscle weakness  Unable to care for self  Intellectual delay  Comment: Chronic. S/T prolonged hospitalization. Per therapy- Patient requires set up for UB needs, ModA for LB needs. Ambulates up to 150ft 2WW CGA.   Plan:  -Continue Physical therapy and Occupational therapy as directed  -SW to remain involved for safe discharge planning needs  -Recommend LTC placement post TCU     Acute on Chronic Hypoxic Respiratory Failure  History of VAP  COPD  ADEEL  PE, Acute, segmental, subsegmental  Pulmonary emphysema  Comment: Acute on chronic. Admitted to [facility] on 3/22/25: She had a repeat CTA that demonstrated progression of her PE and RLL collapse concerning for mucous plugging and pneumonia. Primary team has been having issues w/ progressively increasing FiO2 needs. She had a sputum culture grow Candida & has been on fluconazole since 3/25. Transferred to [facility] d/t concerns that she might require a tracheostomy secondary to her inability to liberate from the vent. At [facility] patient was admitted to the ICU on a ventilator, successfully extubated 4/1 and downgraded to floor status 4/3. Infectious disease consulted and continued patient on Zosyn course to be  completed through 4/17/2025. Patient was able to liberate from ventilator withOUT tracheostomy formation.   Plan:  -Monitor respiratory status  -Oxygen 3L via oxymask overnights.   -Encourage incentive spirometry every 4 hours while awake  -Continue apixaban 5mg BID  -Continue albuterol nebulization every 2 hour PRN  -Continue umeclidinium-vilanterol (anoro ellipta) 62.5-25mcg inhaler daily  -Recent labs stable. BMP and CBC due 5/21/25     Perineal Abscess  Condyloma Acuminata  Comment: Acute on chronic. Per recent hospitalization-General surgery consulted for right keiry/perineal abscess and performed incision and drainage with Penrose drain placement 3/31 with subsequent repeat I&D 4/2 and 4/8. s/p acyclovir and I&D x3. Noted on 4/28/25-5/4/25: Wound vac remains--penrose drain removed--packing to tract along/under R labia.   Plan:  -Monitor for worsening s/symptoms of concerns  -Air mattress while on site  -Follow up with wound clinic as directed. Scheduled for 5/20/25  -Continue noguera catheter needs as this time. Recommend to start voiding trial next week?  -Monitor pain complaints  -Continue Tylenol PRN  -Recent labs stable. BMP and CBC due 5/21/25  -Continue wound care as directed:  *Wound Vac Right Groin: Cleanse w/ Vashe prior to replacing NPWT Setting: -125 Window paned all periwound skin with Vac drape prior to applying sponge and pack into tunnel w/ black foam, then cover inferior prior drain opening w/ VAC Drape. Change Tues/Fridays     Seborrheic dermatitis:   Comment: Acute on chronic. Noted to face PTA  Plan:  -Monitor for worsening s/symptoms of concerns  -Stop topical agents as skin cleared.      T2DM  Poorly controlled as outpatient with A1c 12.7 in march 2025. Used to be on insulin pump. No longer on it due to intellectual delay.  Plan:  -Monitor Blood Glucose QID using Freestyle Bud 2 device. Change device every 14 days and PRN. Update provider if Blood Glucose<70 and.or >450  -Continue insulin  aspart novolog sliding scale TID with meals and HS  -Continue jardiance 25mg daily  -Monitor hgb A1c every 3 months. Due June 2025  -Continue insulin aspart 10units TID with meals scheduled. HOLD if Blood Glucose<120  -Continue insulin glargine 38 units at HS. HOLD if Blood Glucose<120  -Recent labs stable. BMP and CBC due 5/21/25         CKD stage 4  YADIRA  Comment: Chronic. Baseline creatinine~1.1-1.3  Plan:  -Monitor urinary status  -Monitor for worsening s/symptoms of concerns  -Recent labs stable. BMP and CBC due 5/21/25     Essential hypertension   Hyperlipidemia, unspecified hyperlipidemia type   Comment: Chronic. Based on JNC-8 goals,  patients age of 59 year old, presence of diabetes or CKD, and goals of care goal BP is  <140/90 mm Hg. Patient is stable with current plan of care and routine assessment..  Plan:  -Monitor BP and HR as directed  -Continue amlodipine 5mg daily. HOLD if SBP<100  -Continue atorvastatin 40mg daily  -Continue apixaban 5mg BID  -Recent labs stable. BMP and CBC due 5/21/25     Reactive thrombocytosis  Comment: Chronic. Platelets historically in normal range (reviewed last 3 years). Peaked at 700,000, now resolved--Likely  reactive - reviewed flow chart for acute thrombocytosis, posted in note. Normal peripheral smear, liver enzymes  Plan;  -Monitor bleeding risks  -Monitor for worsening s/symptoms of concerns  -Recent labs stable. BMP and CBC due 5/21/25     Electronically signed by:  Dr. Jayshree Montenegro DNP, APRN, FNP-C, WCS-C, EDS-C     Provider reviewed records from facility, and interpreted most recent imaging/lab work, and vital signs.   Acute and chronic conditions managed by writer. Have been reviewed during today's exam.

## 2025-05-16 ENCOUNTER — TRANSITIONAL CARE UNIT VISIT (OUTPATIENT)
Dept: GERIATRICS | Facility: CLINIC | Age: 60
End: 2025-05-16
Payer: COMMERCIAL

## 2025-05-16 DIAGNOSIS — J43.9 PULMONARY EMPHYSEMA, UNSPECIFIED EMPHYSEMA TYPE (H): ICD-10-CM

## 2025-05-16 DIAGNOSIS — N18.4 CHRONIC KIDNEY DISEASE, STAGE 4 (SEVERE) (H): ICD-10-CM

## 2025-05-16 DIAGNOSIS — J44.1 CHRONIC OBSTRUCTIVE PULMONARY DISEASE WITH ACUTE EXACERBATION (H): ICD-10-CM

## 2025-05-16 DIAGNOSIS — E78.5 HYPERLIPIDEMIA, UNSPECIFIED HYPERLIPIDEMIA TYPE: ICD-10-CM

## 2025-05-16 DIAGNOSIS — Z87.01 HISTORY OF PNEUMONIA: ICD-10-CM

## 2025-05-16 DIAGNOSIS — G47.33 OSA (OBSTRUCTIVE SLEEP APNEA): ICD-10-CM

## 2025-05-16 DIAGNOSIS — I26.99 ACUTE PULMONARY EMBOLISM, UNSPECIFIED PULMONARY EMBOLISM TYPE, UNSPECIFIED WHETHER ACUTE COR PULMONALE PRESENT (H): ICD-10-CM

## 2025-05-16 DIAGNOSIS — D75.838 REACTIVE THROMBOCYTOSIS: ICD-10-CM

## 2025-05-16 DIAGNOSIS — I48.0 PAF (PAROXYSMAL ATRIAL FIBRILLATION) (H): ICD-10-CM

## 2025-05-16 DIAGNOSIS — L02.215 PERINEAL ABSCESS: ICD-10-CM

## 2025-05-16 DIAGNOSIS — I10 ESSENTIAL HYPERTENSION: ICD-10-CM

## 2025-05-16 DIAGNOSIS — J96.22 ACUTE ON CHRONIC RESPIRATORY FAILURE WITH HYPOXIA AND HYPERCAPNIA (H): ICD-10-CM

## 2025-05-16 DIAGNOSIS — J96.21 ACUTE ON CHRONIC RESPIRATORY FAILURE WITH HYPOXIA AND HYPERCAPNIA (H): ICD-10-CM

## 2025-05-16 DIAGNOSIS — M62.81 GENERALIZED MUSCLE WEAKNESS: ICD-10-CM

## 2025-05-16 DIAGNOSIS — L21.9 SEBORRHEIC DERMATITIS: ICD-10-CM

## 2025-05-16 DIAGNOSIS — N17.9 AKI (ACUTE KIDNEY INJURY): ICD-10-CM

## 2025-05-16 DIAGNOSIS — T14.8XXA OPEN WOUND: ICD-10-CM

## 2025-05-16 DIAGNOSIS — F81.9 INTELLECTUAL DELAY: ICD-10-CM

## 2025-05-16 DIAGNOSIS — Z78.9 UNABLE TO CARE FOR SELF: ICD-10-CM

## 2025-05-16 DIAGNOSIS — E08.42 DIABETIC POLYNEUROPATHY ASSOCIATED WITH DIABETES MELLITUS DUE TO UNDERLYING CONDITION (H): ICD-10-CM

## 2025-05-16 DIAGNOSIS — A63.0 CONDYLOMA ACUMINATA: ICD-10-CM

## 2025-05-16 DIAGNOSIS — E11.9 TYPE 2 DIABETES, HBA1C GOAL < 7% (H): ICD-10-CM

## 2025-05-16 DIAGNOSIS — R53.81 PHYSICAL DECONDITIONING: Primary | ICD-10-CM

## 2025-05-16 LAB
GAMMA INTERFERON BACKGROUND BLD IA-ACNC: 0.11 IU/ML
M TB IFN-G BLD-IMP: NEGATIVE
M TB IFN-G CD4+ BCKGRND COR BLD-ACNC: 9.89 IU/ML
MITOGEN IGNF BCKGRD COR BLD-ACNC: 0.01 IU/ML
MITOGEN IGNF BCKGRD COR BLD-ACNC: 0.02 IU/ML
QUANTIFERON MITOGEN: 10 IU/ML
QUANTIFERON NIL TUBE: 0.11 IU/ML
QUANTIFERON TB1 TUBE: 0.12 IU/ML
QUANTIFERON TB2 TUBE: 0.13

## 2025-05-16 NOTE — LETTER
5/16/2025      Marly Cannon  [address]  [place] [address]        No notes on file      Sincerely,        BLAIR Ramirez CNP    Electronically signed

## 2025-05-18 NOTE — PROGRESS NOTES
[facility] GERIATRICS    Chief Complaint   Patient presents with    RECHECK     HPI:  Marly Cannon is a 59 year old  (1965), who is being seen today for an episodic care visit at: [facility] ([facility])([facility]) [[id number]]. Today's concern is: The primary encounter diagnosis was Physical deconditioning. Diagnoses of Generalized muscle weakness, Acute on chronic respiratory failure with hypoxia and hypercapnia (H), History of pneumonia, Chronic obstructive pulmonary disease with acute exacerbation (H), ADEEL (obstructive sleep apnea), Perineal abscess, Condyloma acuminata, Acute pulmonary embolism, unspecified pulmonary embolism type, unspecified whether acute cor pulmonale present (H), Pulmonary emphysema, unspecified emphysema type (H), Type 2 diabetes, HbA1c goal < 7% (H), Diabetic polyneuropathy associated with diabetes mellitus due to underlying condition (H), YADIRA (acute kidney injury), Chronic kidney disease, stage 4 (severe) (H), PAF (paroxysmal atrial fibrillation) (H), Essential hypertension, Hyperlipidemia, unspecified hyperlipidemia type, Unable to care for self, Intellectual delay, Seborrheic dermatitis, Reactive thrombocytosis, and Open wound were also pertinent to this visit.    Met with patient who was found lying in bed. She denies any chest pain, palpitations, shortness of breath, RUBY, lightheadedness, dizziness, or cough. Denies any abdominal discomfort. Denies N&V. Denies B&B concerns. Denies dysuria or frequency. Landrum catheter present with clear yellow urine present. Denies loose or constipation. Appetite good. Sleeping well. Compliant with oxygen at . Mood stable. She was updated of wound clinic appt that is scheduled tomorrow. Denies any pain complaints.     BP Readings from Last 3 Encounters:   05/19/25 122/71   05/15/25 116/62   05/14/25 (!) 140/71     Wt Readings from Last 5 Encounters:   05/19/25 83.6 kg (184 lb 6.4 oz)   05/15/25 83.6 kg (184 lb 6.4 oz)  "  05/14/25 83.6 kg (184 lb 6.4 oz)   05/12/25 83.6 kg (184 lb 6.4 oz)   05/08/25 87.4 kg (192 lb 11.2 oz)     Allergies, and PMH/PSH reviewed in Psychiatric today.  REVIEW OF SYSTEMS:  Limited secondary to cognitive impairment but today pt reports as noted per HPI    Objective:   /71   Pulse 93   Temp 98.6  F (37  C)   Resp 18   Ht 1.651 m (5' 5\")   Wt 83.6 kg (184 lb 6.4 oz)   SpO2 96%   BMI 30.69 kg/m    GENERAL APPEARANCE:  Alert, in no distress, cooperative  ENT:  Mouth and posterior oropharynx normal, moist mucous membranes, [place]  EYES:  EOM, conjunctivae, lids, pupils and irises normal  NECK:  No adenopathy,masses or thyromegaly  RESP:  respiratory effort and palpation of chest normal, lungs clear to auscultation , no respiratory distress  CV:  regular rate and rhythm, no murmur, rub, or gallop, no edema, +2 pedal pulses  ABDOMEN:  normal bowel sounds, soft, nontender, no hepatosplenomegaly or other masses, no guarding or rebound  M/S:   Ambulates with walker. Staff to assist for ADLs, transfers and cares. Wheelchair for long distance needs  SKIN:  Inspection of skin and subcutaneous tissue baseline, did not observe wound today. Wound vac C/D/I  NEURO:   Cranial nerves 2-12 are normal tested and grossly at patient's baseline, no purposeful movement in upper and lower extremities  PSYCH:  oriented to self and place, memory impaired , affect and mood normal    Most Recent 3 CBC's:  Recent Labs   Lab Test 05/15/25  0850 05/09/25  0619 05/08/25  0644 05/05/25  0654   WBC 10.6 8.8  --  8.2   HGB 12.5 11.7  --  11.8   MCV 94 93  --  96    380 334 304     Most Recent 3 BMP's:  Recent Labs   Lab Test 05/15/25  0850 05/09/25  1208 05/09/25  0811 05/09/25  0619 05/08/25  0815 05/08/25  0644 05/05/25  0813 05/05/25  0654     --   --  140  --   --   --  142   POTASSIUM 4.4  --   --  4.5  --   --   --  4.3   CHLORIDE 98  --   --  102  --   --   --  103   CO2 25  --   --  31*  --   --   --  30*   BUN 48.6* "  --   --  47.4*  --   --   --  49.6*   CR 1.25*  --   --  1.18*  --  1.30*  --  1.21*   ANIONGAP 14  --   --  7  --   --   --  9   NORM 10.0  --   --  9.7  --   --   --  9.7   * 208* 136* 132*   < >  --    < > 132*    < > = values in this interval not displayed.     Most Recent 2 LFT's:  Recent Labs   Lab Test 05/15/25  0850 05/05/25  0654   AST 24 15   ALT 11 13   ALKPHOS 96 87   BILITOTAL 0.6 0.4     Most Recent Hemoglobin A1c:  Recent Labs   Lab Test 03/22/25  1731   A1C 12.7*     Most Recent Anemia Panel:  Recent Labs   Lab Test 05/15/25  0850 04/23/25  0623 04/21/25  0814 04/07/25  0542 04/06/25  0431 04/05/25  0451 04/19/23  1510 01/17/23  1505   WBC 10.6   < > 9.7   < > 7.3 7.7   < > 10.1   HGB 12.5   < > 11.2*   < > 10.1* 9.9*   < > 17.1*   HCT 39.2   < > 34.8*   < > 31.2* 32.5*   < > 50.1*   MCV 94   < > 96   < > 94 96   < > 88      < > 650*   < > 282 292   < > 263   IRON  --   --   --   --   --  51  --   --    IRONSAT  --   --   --   --   --  27  --   --    RETICABSCT  --   --  0.116*  --   --   --   --   --    RETP  --   --  3.2*  --   --   --   --   --    FEB  --   --   --   --   --  188*  --   --    OLIVA  --   --   --   --   --  407*  --   --    B12 559  --   --   --   --  859   < >  --    FOLIC  --   --   --   --  4.9  --   --   --    EPOE  --   --   --   --   --   --   --  7    < > = values in this interval not displayed.       ASSESSMENT/PLAN:  Physical deconditioning  Generalized muscle weakness  Unable to care for self  Intellectual delay  Comment: Chronic. S/T prolonged hospitalization. Per therapy- Patient requires set up for UB needs, ModA for LB needs. Ambulates up to 150ft 2WW CGA.   Plan:  -Continue Physical therapy and Occupational therapy as directed  -SW to remain involved for safe discharge planning needs  -Recommend LTC placement post TCU     Acute on Chronic Hypoxic Respiratory Failure  History of VAP  COPD  ADEEL  PE, Acute, segmental, subsegmental  Pulmonary  emphysema  Comment: Acute on chronic. Admitted to [facility] on 3/22/25: She had a repeat CTA that demonstrated progression of her PE and RLL collapse concerning for mucous plugging and pneumonia. Primary team has been having issues w/ progressively increasing FiO2 needs. She had a sputum culture grow Candida & has been on fluconazole since 3/25. Transferred to [facility] d/t concerns that she might require a tracheostomy secondary to her inability to liberate from the vent. At [facility] patient was admitted to the ICU on a ventilator, successfully extubated 4/1 and downgraded to floor status 4/3. Infectious disease consulted and continued patient on Zosyn course to be completed through 4/17/2025. Patient was able to liberate from ventilator withOUT tracheostomy formation.   Plan:  -Monitor respiratory status  -Oxygen 3L via oxymask overnights.   -Encourage incentive spirometry every 4 hours while awake  -Continue apixaban 5mg BID  -Continue albuterol nebulization every 2 hour PRN  -Continue umeclidinium-vilanterol (anoro ellipta) 62.5-25mcg inhaler daily  -Recent labs stable. BMP and CBC due 5/21/25     Perineal Abscess  Condyloma Acuminata  Comment: Acute on chronic. Per recent hospitalization-General surgery consulted for right keiry/perineal abscess and performed incision and drainage with Penrose drain placement 3/31 with subsequent repeat I&D 4/2 and 4/8. s/p acyclovir and I&D x3. Noted on 4/28/25-5/4/25: Wound vac remains--penrose drain removed--packing to tract along/under R labia.   Plan:  -Monitor for worsening s/symptoms of concerns  -Air mattress while on site  -Follow up with wound clinic as directed. Scheduled for 5/20/25  -Remove noguera catheter today. Start PVR Q shift x 3 days. St cath if PVR>450  -Monitor pain complaints  -Continue Tylenol PRN  -Recent labs stable. BMP and CBC due 5/21/25  -Continue wound care as directed:  *Wound Vac Right Groin: Cleanse w/ Vashe prior to  replacing NPWT Setting: -125 Window paned all periwound skin with Vac drape prior to applying sponge and pack into tunnel w/ black foam, then cover inferior prior drain opening w/ VAC Drape. Change Tues/Fridays     Seborrheic dermatitis:   Comment: Acute on chronic. Noted to face PTA  Plan:  -Monitor for worsening s/symptoms of concerns     T2DM  Poorly controlled as outpatient with A1c 12.7 in march 2025. Used to be on insulin pump. No longer on it due to intellectual delay.  Plan:  -Monitor Blood Glucose QID using Freestyle Bud 2 device. Change device every 14 days and PRN. Update provider if Blood Glucose<70 and.or >450  -Continue insulin aspart novolog sliding scale TID with meals and HS  -Continue jardiance 25mg daily  -Monitor hgb A1c every 3 months. Due June 2025  -Continue insulin aspart 10units TID with meals scheduled. HOLD if Blood Glucose<120  -Continue insulin glargine 38 units at HS. HOLD if Blood Glucose<120  -Recent labs stable. BMP and CBC due 5/21/25     CKD stage 4  YADIRA  Comment: Chronic. Baseline creatinine~1.1-1.3  Plan:  -Monitor urinary status  -Monitor for worsening s/symptoms of concerns  -Recent labs stable. BMP and CBC due 5/21/25     Essential hypertension   Hyperlipidemia, unspecified hyperlipidemia type   Comment: Chronic. Based on JNC-8 goals,  patients age of 59 year old, presence of diabetes or CKD, and goals of care goal BP is  <140/90 mm Hg. Patient is stable with current plan of care and routine assessment..  Plan:  -Monitor BP and HR as directed  -Continue amlodipine 5mg daily. HOLD if SBP<100  -Continue atorvastatin 40mg daily  -Continue apixaban 5mg BID  -Recent labs stable. BMP and CBC due 5/21/25     Reactive thrombocytosis  Comment: Chronic. Platelets historically in normal range (reviewed last 3 years). Peaked at 700,000, now resolved--Likely  reactive - reviewed flow chart for acute thrombocytosis, posted in note. Normal peripheral smear, liver enzymes  Plan;  -Monitor  bleeding risks  -Monitor for worsening s/symptoms of concerns  -Recent labs stable. BMP and CBC due 5/21/25     Electronically signed by:  Dr. Jayshree Montenegro DNP, APRN, FNP-C, WCS-C, EDS-C     Provider reviewed records from facility, and interpreted most recent imaging/lab work, and vital signs.   Acute and chronic conditions managed by writer. Have been reviewed during today's exam.

## 2025-05-19 ENCOUNTER — TRANSITIONAL CARE UNIT VISIT (OUTPATIENT)
Dept: GERIATRICS | Facility: CLINIC | Age: 60
End: 2025-05-19
Payer: COMMERCIAL

## 2025-05-19 VITALS
BODY MASS INDEX: 30.72 KG/M2 | SYSTOLIC BLOOD PRESSURE: 122 MMHG | RESPIRATION RATE: 18 BRPM | OXYGEN SATURATION: 96 % | WEIGHT: 184.4 LBS | TEMPERATURE: 98.6 F | DIASTOLIC BLOOD PRESSURE: 71 MMHG | HEART RATE: 93 BPM | HEIGHT: 65 IN

## 2025-05-19 DIAGNOSIS — Z78.9 UNABLE TO CARE FOR SELF: ICD-10-CM

## 2025-05-19 DIAGNOSIS — J43.9 PULMONARY EMPHYSEMA, UNSPECIFIED EMPHYSEMA TYPE (H): ICD-10-CM

## 2025-05-19 DIAGNOSIS — R53.81 PHYSICAL DECONDITIONING: Primary | ICD-10-CM

## 2025-05-19 DIAGNOSIS — M62.81 GENERALIZED MUSCLE WEAKNESS: ICD-10-CM

## 2025-05-19 DIAGNOSIS — J44.1 CHRONIC OBSTRUCTIVE PULMONARY DISEASE WITH ACUTE EXACERBATION (H): ICD-10-CM

## 2025-05-19 DIAGNOSIS — I48.0 PAF (PAROXYSMAL ATRIAL FIBRILLATION) (H): ICD-10-CM

## 2025-05-19 DIAGNOSIS — D75.838 REACTIVE THROMBOCYTOSIS: ICD-10-CM

## 2025-05-19 DIAGNOSIS — J96.22 ACUTE ON CHRONIC RESPIRATORY FAILURE WITH HYPOXIA AND HYPERCAPNIA (H): ICD-10-CM

## 2025-05-19 DIAGNOSIS — E08.42 DIABETIC POLYNEUROPATHY ASSOCIATED WITH DIABETES MELLITUS DUE TO UNDERLYING CONDITION (H): ICD-10-CM

## 2025-05-19 DIAGNOSIS — N18.4 CHRONIC KIDNEY DISEASE, STAGE 4 (SEVERE) (H): ICD-10-CM

## 2025-05-19 DIAGNOSIS — T14.8XXA OPEN WOUND: ICD-10-CM

## 2025-05-19 DIAGNOSIS — J96.21 ACUTE ON CHRONIC RESPIRATORY FAILURE WITH HYPOXIA AND HYPERCAPNIA (H): ICD-10-CM

## 2025-05-19 DIAGNOSIS — G47.33 OSA (OBSTRUCTIVE SLEEP APNEA): ICD-10-CM

## 2025-05-19 DIAGNOSIS — E78.5 HYPERLIPIDEMIA, UNSPECIFIED HYPERLIPIDEMIA TYPE: ICD-10-CM

## 2025-05-19 DIAGNOSIS — L02.215 PERINEAL ABSCESS: ICD-10-CM

## 2025-05-19 DIAGNOSIS — E11.9 TYPE 2 DIABETES, HBA1C GOAL < 7% (H): ICD-10-CM

## 2025-05-19 DIAGNOSIS — F81.9 INTELLECTUAL DELAY: ICD-10-CM

## 2025-05-19 DIAGNOSIS — N17.9 AKI (ACUTE KIDNEY INJURY): ICD-10-CM

## 2025-05-19 DIAGNOSIS — I26.99 ACUTE PULMONARY EMBOLISM, UNSPECIFIED PULMONARY EMBOLISM TYPE, UNSPECIFIED WHETHER ACUTE COR PULMONALE PRESENT (H): ICD-10-CM

## 2025-05-19 DIAGNOSIS — A63.0 CONDYLOMA ACUMINATA: ICD-10-CM

## 2025-05-19 DIAGNOSIS — Z87.01 HISTORY OF PNEUMONIA: ICD-10-CM

## 2025-05-19 DIAGNOSIS — I10 ESSENTIAL HYPERTENSION: ICD-10-CM

## 2025-05-19 DIAGNOSIS — L21.9 SEBORRHEIC DERMATITIS: ICD-10-CM

## 2025-05-19 PROCEDURE — 99309 SBSQ NF CARE MODERATE MDM 30: CPT | Performed by: NURSE PRACTITIONER

## 2025-05-19 NOTE — LETTER
5/19/2025      Marly Cannon  [address]  [place] [address]        [facility] GERIATRICS    Chief Complaint   Patient presents with     RECHECK     HPI:  Marly Cannon is a 59 year old  (1965), who is being seen today for an episodic care visit at: [facility] ([facility])([facility]) [[id number]]. Today's concern is: The primary encounter diagnosis was Physical deconditioning. Diagnoses of Generalized muscle weakness, Acute on chronic respiratory failure with hypoxia and hypercapnia (H), History of pneumonia, Chronic obstructive pulmonary disease with acute exacerbation (H), ADEEL (obstructive sleep apnea), Perineal abscess, Condyloma acuminata, Acute pulmonary embolism, unspecified pulmonary embolism type, unspecified whether acute cor pulmonale present (H), Pulmonary emphysema, unspecified emphysema type (H), Type 2 diabetes, HbA1c goal < 7% (H), Diabetic polyneuropathy associated with diabetes mellitus due to underlying condition (H), YADIRA (acute kidney injury), Chronic kidney disease, stage 4 (severe) (H), PAF (paroxysmal atrial fibrillation) (H), Essential hypertension, Hyperlipidemia, unspecified hyperlipidemia type, Unable to care for self, Intellectual delay, Seborrheic dermatitis, Reactive thrombocytosis, and Open wound were also pertinent to this visit.    Met with patient who was found lying in bed. She denies any chest pain, palpitations, shortness of breath, RUBY, lightheadedness, dizziness, or cough. Denies any abdominal discomfort. Denies N&V. Denies B&B concerns. Denies dysuria or frequency. Landrum catheter present with clear yellow urine present. Denies loose or constipation. Appetite good. Sleeping well. Compliant with oxygen at . Mood stable. She was updated of wound clinic appt that is scheduled tomorrow. Denies any pain complaints.     BP Readings from Last 3 Encounters:   05/19/25 122/71   05/15/25 116/62   05/14/25 (!) 140/71     Wt Readings from Last 5  "Encounters:   05/19/25 83.6 kg (184 lb 6.4 oz)   05/15/25 83.6 kg (184 lb 6.4 oz)   05/14/25 83.6 kg (184 lb 6.4 oz)   05/12/25 83.6 kg (184 lb 6.4 oz)   05/08/25 87.4 kg (192 lb 11.2 oz)     Allergies, and PMH/PSH reviewed in EPIC today.  REVIEW OF SYSTEMS:  Limited secondary to cognitive impairment but today pt reports as noted per HPI    Objective:   /71   Pulse 93   Temp 98.6  F (37  C)   Resp 18   Ht 1.651 m (5' 5\")   Wt 83.6 kg (184 lb 6.4 oz)   SpO2 96%   BMI 30.69 kg/m    GENERAL APPEARANCE:  Alert, in no distress, cooperative  ENT:  Mouth and posterior oropharynx normal, moist mucous membranes, [place]  EYES:  EOM, conjunctivae, lids, pupils and irises normal  NECK:  No adenopathy,masses or thyromegaly  RESP:  respiratory effort and palpation of chest normal, lungs clear to auscultation , no respiratory distress  CV:  regular rate and rhythm, no murmur, rub, or gallop, no edema, +2 pedal pulses  ABDOMEN:  normal bowel sounds, soft, nontender, no hepatosplenomegaly or other masses, no guarding or rebound  M/S:   Ambulates with walker. Staff to assist for ADLs, transfers and cares. Wheelchair for long distance needs  SKIN:  Inspection of skin and subcutaneous tissue baseline, did not observe wound today. Wound vac C/D/I  NEURO:   Cranial nerves 2-12 are normal tested and grossly at patient's baseline, no purposeful movement in upper and lower extremities  PSYCH:  oriented to self and place, memory impaired , affect and mood normal    Most Recent 3 CBC's:  Recent Labs   Lab Test 05/15/25  0850 05/09/25  0619 05/08/25  0644 05/05/25  0654   WBC 10.6 8.8  --  8.2   HGB 12.5 11.7  --  11.8   MCV 94 93  --  96    380 334 304     Most Recent 3 BMP's:  Recent Labs   Lab Test 05/15/25  0850 05/09/25  1208 05/09/25  0811 05/09/25  0619 05/08/25  0815 05/08/25  0644 05/05/25  0813 05/05/25  0654     --   --  140  --   --   --  142   POTASSIUM 4.4  --   --  4.5  --   --   --  4.3   CHLORIDE 98  --  "  --  102  --   --   --  103   CO2 25  --   --  31*  --   --   --  30*   BUN 48.6*  --   --  47.4*  --   --   --  49.6*   CR 1.25*  --   --  1.18*  --  1.30*  --  1.21*   ANIONGAP 14  --   --  7  --   --   --  9   NORM 10.0  --   --  9.7  --   --   --  9.7   * 208* 136* 132*   < >  --    < > 132*    < > = values in this interval not displayed.     Most Recent 2 LFT's:  Recent Labs   Lab Test 05/15/25  0850 05/05/25  0654   AST 24 15   ALT 11 13   ALKPHOS 96 87   BILITOTAL 0.6 0.4     Most Recent Hemoglobin A1c:  Recent Labs   Lab Test 03/22/25  1731   A1C 12.7*     Most Recent Anemia Panel:  Recent Labs   Lab Test 05/15/25  0850 04/23/25  0623 04/21/25  0814 04/07/25  0542 04/06/25  0431 04/05/25  0451 04/19/23  1510 01/17/23  1505   WBC 10.6   < > 9.7   < > 7.3 7.7   < > 10.1   HGB 12.5   < > 11.2*   < > 10.1* 9.9*   < > 17.1*   HCT 39.2   < > 34.8*   < > 31.2* 32.5*   < > 50.1*   MCV 94   < > 96   < > 94 96   < > 88      < > 650*   < > 282 292   < > 263   IRON  --   --   --   --   --  51  --   --    IRONSAT  --   --   --   --   --  27  --   --    RETICABSCT  --   --  0.116*  --   --   --   --   --    RETP  --   --  3.2*  --   --   --   --   --    FEB  --   --   --   --   --  188*  --   --    OLIVA  --   --   --   --   --  407*  --   --    B12 559  --   --   --   --  859   < >  --    FOLIC  --   --   --   --  4.9  --   --   --    EPOE  --   --   --   --   --   --   --  7    < > = values in this interval not displayed.       ASSESSMENT/PLAN:  Physical deconditioning  Generalized muscle weakness  Unable to care for self  Intellectual delay  Comment: Chronic. S/T prolonged hospitalization. Per therapy- Patient requires set up for UB needs, ModA for LB needs. Ambulates up to 150ft 2WW CGA.   Plan:  -Continue Physical therapy and Occupational therapy as directed  -SW to remain involved for safe discharge planning needs  -Recommend LTC placement post TCU     Acute on Chronic Hypoxic Respiratory Failure  History  of VAP  COPD  ADEEL  PE, Acute, segmental, subsegmental  Pulmonary emphysema  Comment: Acute on chronic. Admitted to [facility] on 3/22/25: She had a repeat CTA that demonstrated progression of her PE and RLL collapse concerning for mucous plugging and pneumonia. Primary team has been having issues w/ progressively increasing FiO2 needs. She had a sputum culture grow Candida & has been on fluconazole since 3/25. Transferred to [facility] d/t concerns that she might require a tracheostomy secondary to her inability to liberate from the vent. At [facility] patient was admitted to the ICU on a ventilator, successfully extubated 4/1 and downgraded to floor status 4/3. Infectious disease consulted and continued patient on Zosyn course to be completed through 4/17/2025. Patient was able to liberate from ventilator withOUT tracheostomy formation.   Plan:  -Monitor respiratory status  -Oxygen 3L via oxymask overnights.   -Encourage incentive spirometry every 4 hours while awake  -Continue apixaban 5mg BID  -Continue albuterol nebulization every 2 hour PRN  -Continue umeclidinium-vilanterol (anoro ellipta) 62.5-25mcg inhaler daily  -Recent labs stable. BMP and CBC due 5/21/25     Perineal Abscess  Condyloma Acuminata  Comment: Acute on chronic. Per recent hospitalization-General surgery consulted for right keiry/perineal abscess and performed incision and drainage with Penrose drain placement 3/31 with subsequent repeat I&D 4/2 and 4/8. s/p acyclovir and I&D x3. Noted on 4/28/25-5/4/25: Wound vac remains--penrose drain removed--packing to tract along/under R labia.   Plan:  -Monitor for worsening s/symptoms of concerns  -Air mattress while on site  -Follow up with wound clinic as directed. Scheduled for 5/20/25  -Remove noguera catheter today. Start PVR Q shift x 3 days. St cath if PVR>450  -Monitor pain complaints  -Continue Tylenol PRN  -Recent labs stable. BMP and CBC due 5/21/25  -Continue wound care as  directed:  *Wound Vac Right Groin: Cleanse w/ Vashe prior to replacing NPWT Setting: -125 Window paned all periwound skin with Vac drape prior to applying sponge and pack into tunnel w/ black foam, then cover inferior prior drain opening w/ VAC Drape. Change Tues/Fridays     Seborrheic dermatitis:   Comment: Acute on chronic. Noted to face PTA  Plan:  -Monitor for worsening s/symptoms of concerns     T2DM  Poorly controlled as outpatient with A1c 12.7 in march 2025. Used to be on insulin pump. No longer on it due to intellectual delay.  Plan:  -Monitor Blood Glucose QID using Freestyle Bud 2 device. Change device every 14 days and PRN. Update provider if Blood Glucose<70 and.or >450  -Continue insulin aspart novolog sliding scale TID with meals and HS  -Continue jardiance 25mg daily  -Monitor hgb A1c every 3 months. Due June 2025  -Continue insulin aspart 10units TID with meals scheduled. HOLD if Blood Glucose<120  -Continue insulin glargine 38 units at HS. HOLD if Blood Glucose<120  -Recent labs stable. BMP and CBC due 5/21/25     CKD stage 4  YADIRA  Comment: Chronic. Baseline creatinine~1.1-1.3  Plan:  -Monitor urinary status  -Monitor for worsening s/symptoms of concerns  -Recent labs stable. BMP and CBC due 5/21/25     Essential hypertension   Hyperlipidemia, unspecified hyperlipidemia type   Comment: Chronic. Based on JNC-8 goals,  patients age of 59 year old, presence of diabetes or CKD, and goals of care goal BP is  <140/90 mm Hg. Patient is stable with current plan of care and routine assessment..  Plan:  -Monitor BP and HR as directed  -Continue amlodipine 5mg daily. HOLD if SBP<100  -Continue atorvastatin 40mg daily  -Continue apixaban 5mg BID  -Recent labs stable. BMP and CBC due 5/21/25     Reactive thrombocytosis  Comment: Chronic. Platelets historically in normal range (reviewed last 3 years). Peaked at 700,000, now resolved--Likely  reactive - reviewed flow chart for acute thrombocytosis, posted in  note. Normal peripheral smear, liver enzymes  Plan;  -Monitor bleeding risks  -Monitor for worsening s/symptoms of concerns  -Recent labs stable. BMP and CBC due 5/21/25     Electronically signed by:  Dr. Jayshree Montengero DNP, APRN, FNP-C, WCS-C, EDS-C     Provider reviewed records from facility, and interpreted most recent imaging/lab work, and vital signs.   Acute and chronic conditions managed by writer. Have been reviewed during today's exam.        Sincerely,        BLAIR Ramirez CNP    Electronically signed

## 2025-05-20 ENCOUNTER — OFFICE VISIT (OUTPATIENT)
Dept: VASCULAR SURGERY | Facility: CLINIC | Age: 60
End: 2025-05-20
Attending: NURSE PRACTITIONER
Payer: COMMERCIAL

## 2025-05-20 ENCOUNTER — LAB REQUISITION (OUTPATIENT)
Dept: LAB | Facility: CLINIC | Age: 60
End: 2025-05-20
Payer: COMMERCIAL

## 2025-05-20 VITALS — HEART RATE: 108 BPM | OXYGEN SATURATION: 94 % | DIASTOLIC BLOOD PRESSURE: 74 MMHG | SYSTOLIC BLOOD PRESSURE: 106 MMHG

## 2025-05-20 DIAGNOSIS — B37.9 CANDIDA INFECTION: ICD-10-CM

## 2025-05-20 DIAGNOSIS — L02.214 GROIN ABSCESS: Primary | ICD-10-CM

## 2025-05-20 DIAGNOSIS — N18.30 CHRONIC KIDNEY DISEASE, STAGE 3 UNSPECIFIED (H): ICD-10-CM

## 2025-05-20 DIAGNOSIS — D64.9 ANEMIA, UNSPECIFIED: ICD-10-CM

## 2025-05-20 DIAGNOSIS — E08.42 DIABETIC POLYNEUROPATHY ASSOCIATED WITH DIABETES MELLITUS DUE TO UNDERLYING CONDITION (H): ICD-10-CM

## 2025-05-20 DIAGNOSIS — E11.9 TYPE 2 DIABETES, HBA1C GOAL < 7% (H): ICD-10-CM

## 2025-05-20 DIAGNOSIS — I10 ESSENTIAL (PRIMARY) HYPERTENSION: ICD-10-CM

## 2025-05-20 DIAGNOSIS — F79 INTELLECTUAL DISABILITY: ICD-10-CM

## 2025-05-20 DIAGNOSIS — R41.3 MEMORY DISTURBANCE: ICD-10-CM

## 2025-05-20 PROCEDURE — 1125F AMNT PAIN NOTED PAIN PRSNT: CPT | Performed by: NURSE PRACTITIONER

## 2025-05-20 PROCEDURE — 11045 DBRDMT SUBQ TISS EACH ADDL: CPT | Performed by: NURSE PRACTITIONER

## 2025-05-20 PROCEDURE — 3078F DIAST BP <80 MM HG: CPT | Performed by: NURSE PRACTITIONER

## 2025-05-20 PROCEDURE — 3074F SYST BP LT 130 MM HG: CPT | Performed by: NURSE PRACTITIONER

## 2025-05-20 PROCEDURE — 11042 DBRDMT SUBQ TIS 1ST 20SQCM/<: CPT | Performed by: NURSE PRACTITIONER

## 2025-05-20 PROCEDURE — G0463 HOSPITAL OUTPT CLINIC VISIT: HCPCS | Mod: 25 | Performed by: NURSE PRACTITIONER

## 2025-05-20 PROCEDURE — 99204 OFFICE O/P NEW MOD 45 MIN: CPT | Mod: 25 | Performed by: NURSE PRACTITIONER

## 2025-05-20 RX ORDER — MAG HYDROX/ALUMINUM HYD/SIMETH 400-400-40
SUSPENSION, ORAL (FINAL DOSE FORM) ORAL
Qty: 500 ML | Refills: 4 | Status: SHIPPED | OUTPATIENT
Start: 2025-05-20

## 2025-05-20 RX ORDER — CLOTRIMAZOLE 1 %
CREAM (GRAM) TOPICAL DAILY
Qty: 30 G | Refills: 2 | Status: SHIPPED | OUTPATIENT
Start: 2025-05-20

## 2025-05-20 RX ORDER — CHLORHEXIDINE GLUCONATE 40 MG/ML
SOLUTION TOPICAL EVERY OTHER DAY
Qty: 236 ML | Refills: 0 | Status: SHIPPED | OUTPATIENT
Start: 2025-05-20

## 2025-05-20 ASSESSMENT — PAIN SCALES - GENERAL: PAINLEVEL_OUTOF10: MILD PAIN (2)

## 2025-05-20 NOTE — PROGRESS NOTES
[facility] Consult Note    Date of Service: May 20, 2025     Requesting Provider: Jayshree Montenegro CNP    Chief Complaint: groin wound    History of Present Illness: Marly Cannon is being seen at [facility] today regarding right groin wound/abscess. They arrive to the clinic today alone; was brought by [facility]; she resides at [facility]. She is confused and a poor historian; she does not know why she is here today; she did not know how she transfers; she thought she was living in [place] and then changed to say she was living with her boyfriend. h/o intellectual disability & memory issues, COPD, untreated ADEEL, tobacco use d/o, HTN, pAFib, & poorly controlled T2DM. She was recently hospitalized for extended period initially presented with chest pain and hyperglycemia and continued to progress and decompensate. She was found to have a right groin abscess early April; required 3 I&Ds; and wound vac was applied.  Was currently/previously using wound vac; being changed by staff at facility [facility]. Reports pain of 2/10; currently using nothing for pain. Has used nothing as compression in the past, is currently using nothing for compression. Denies any fevers, chills, or generalized ill feeling. Denies history of cancer. Sleeps in a bed/recliner with legs elevated.  She is incontinent of bowel and bladder; wearing adult brief; hygiene neglect smells of urine.      Review of Systems:   Constitutional:  Negative   EENTM: negative for glasses;  negative [place]  GI:  negative for nausea/vomiting;   negative for constipation   negative diarrhea;   +for fecal incontinence  negative weight loss  :   negative dysuria,  positive incontinence  MS: patient minimally ambulatory;  does use assistive devices  Cardiac: negative   Respiratory:  negative SOB  Endocrine:  positive  diabetes  Psych: negative  depression/anxiety    Past Medical History:   Past Medical History:    Diagnosis Date    ACP (advance care planning) 09/09/2016    Advance Care Planning 9/9/2016: ACP Review of Chart / Resources Provided:  Reviewed chart for advance care plan.  Marly Cannon has been provided information and resources to begin or update their advance care plan.  Added by Naty Allen               Acute on chronic respiratory failure with hypoxia and hypercapnia (H) 03/22/2025    Acute respiratory failure with hypoxia (H) 03/31/2025    Breast fibrocystic disorder 02/22/2008    Overview:   IMO Update 10/11      Callus of foot 05/29/2019    COPD (chronic obstructive pulmonary disease) (H)     Diabetes (H)     Diabetic ketosis (H) 03/18/2025    Diabetic polyneuropathy associated with diabetes mellitus due to underlying condition (H) 01/17/2023    Essential hypertension 05/28/2019    [place] cardiac risk <10% in next 10 years 02/22/2019    Overview:   Started high intensity statin.       H/O colonoscopy 04/24/2019    Had in 10/2018, due for repeat 5 years      Hyperglycemia 03/22/2025    Hyperlipidemia, unspecified hyperlipidemia type 05/28/2019    Hyperparathyroidism due to renal insufficiency 06/14/2016    Hypertension     Intellectual disability 08/01/2017    Memory disturbance 02/13/2020    Microalbuminuria due to type 2 diabetes mellitus (H) 06/14/2016    ADEEL (obstructive sleep apnea) 02/16/2021    Interp for PSG dated 12/16/2020.     Weight 202 lbs.  Total sleep time 417.5 minutes, sleep efficiency 83%, sleep latency 15 minutes, REM latency - minutes.  Arousal index 45.6.  Sleep architecture was incredibly fragmented with no clear REM observed.     AHI 43.1, events appearing primarily obstructive in nature.  Mean Spo2 86%, isabel SpO2 78%, 96.2% of recording was <= 89%.       EKG appeared NS    PAF (paroxysmal atrial fibrillation) (H) 03/22/2025    Pneumonia 03/22/2025    Pulmonary emphysema (H) 08/21/2015    Confirmed via PFTs in 8/2015      Stage 3 chronic kidney disease (H) 02/12/2016     Overview:   Updated per 10/1/17 IMO import      Tobacco abuse 07/09/2015    Tremor 11/09/2009    Formatting of this note might be different from the original.  Shakes head      Tubular adenoma of colon 09/28/2018    Type 2 diabetes, HbA1c goal < 7% (H) 07/24/2015    Unable to care for self 03/18/2025    Urinary incontinence, unspecified type 01/17/2023    Vitamin D deficiency 06/14/2016        Surgical History:   Past Surgical History:   Procedure Laterality Date    BIOPSY BREAST Left 02/14/2008    patient states no bx, Clip in left breast/ stereo bxc 2-- no path in EPIC that far back    COLONOSCOPY N/A 08/20/2015    Procedure: COLONOSCOPY;  Surgeon: Gentry Porter MD;  Location: [facility] OR    COLONOSCOPY N/A 09/21/2018    Procedure: COLONOSCOPY;  COLONOSCOPY WITH POLYPECTOMY;  Surgeon: Gentry Porter MD;  Location: [facility] OR    INCISION AND DRAINAGE PERINEAL, COMBINED N/A 4/2/2025    Procedure: INCISION AND DRAINAGE, PERINEUM;  Surgeon: Jeremy Lyons MD;  Location: [facility] OR    IRRIGATION AND DEBRIDEMENT SACRAL WOUND, COMBINED Right 4/8/2025    Procedure: IRRIGATION AND DEBRIDEMENT, WOUND, RIGHT GROIN AND LABIA;  Surgeon: Casey Rossi MD;  Location: [facility] OR        Medications:   Current Outpatient Medications   Medication Sig Dispense Refill    acetaminophen (TYLENOL) 325 MG tablet Take 2 tablets (650 mg) by mouth every 6 hours as needed for mild pain or fever.      albuterol (PROVENTIL) (2.5 MG/3ML) 0.083% neb solution Take 1 vial (2.5 mg) by nebulization every 2 hours as needed for shortness of breath.      amLODIPine (NORVASC) 5 MG tablet Take 1 tablet (5 mg) by mouth daily. HOLD if SBP<100      apixaban ANTICOAGULANT (ELIQUIS) 5 MG tablet Take 1 tablet (5 mg) by mouth 2 times daily.      atorvastatin (LIPITOR) 40 MG tablet TAKE 1 TABLET BY MOUTH AT BEDTIME 90 tablet 2    Cholecalciferol (VITAMIN D3) 50 MCG (2000 UT) CAPS TAKE 1 CAPSULE BY MOUTH DAILY 90 capsule  0    Continuous Glucose Sensor (FREESTYLE HANK 2 SENSOR) MISC 1 each every 14 days. Use 1 sensor every 14 days. Use to read blood sugars per 's instructions. 6 each 5    empagliflozin (JARDIANCE) 25 MG TABS tablet Take 1 tablet (25 mg) by mouth daily 90 tablet 3    insulin aspart (NOVOLOG PEN) 100 UNIT/ML pen Inject 10 Units subcutaneously 3 times daily (with meals). HOLD if Blood Glucose <120      insulin aspart (NOVOLOG PEN) 100 UNIT/ML pen Inject 1-5 Units subcutaneously at bedtime.      insulin aspart (NOVOLOG PEN) 100 UNIT/ML pen Inject 1-7 Units subcutaneously 3 times daily (with meals).      insulin glargine (LANTUS PEN) 100 UNIT/ML pen Inject 38 Units subcutaneously at bedtime. HOLD if Blood Glucose<120      insulin pen needle (32G X 4 MM) 32G X 4 MM miscellaneous Use 1 pen needles daily 100 each 3    menthol-zinc oxide (CALMOSEPTINE) 0.44-20.6 % OINT ointment Apply topically 4 times daily as needed for skin protection.      multivitamin w/minerals (THERA-VIT-M) tablet Take 1 tablet by mouth daily.      ondansetron (ZOFRAN ODT) 4 MG ODT tab Take 1 tablet (4 mg) by mouth every 6 hours as needed.      OneTouch Delica Lancets 33G MISC Inject 1 each Subcutaneous 3 times daily (before meals) 100 each 11    order for DME Women briefs 50 each 1    polyethylene glycol (MIRALAX) 17 g packet Take 17 g by mouth daily as needed for constipation.      sennosides (SENOKOT) 8.6 MG tablet Take 1 tablet by mouth 2 times daily as needed for constipation.      umeclidinium-vilanterol (ANORO ELLIPTA) 62.5-25 MCG/ACT oral inhaler Inhale 1 puff into the lungs daily.       No current facility-administered medications for this visit.       Allergies: No Known Allergies    Family history:   Family History   Problem Relation Age of Onset    Diabetes Mother     Diabetes Father     Hypertension Brother     Diabetes Sister         Social History:   Social History     Socioeconomic History    Marital status:       Spouse name: Not on file    Number of children: Not on file    Years of education: Not on file    Highest education level: Not on file   Occupational History    Not on file   Tobacco Use    Smoking status: Former     Current packs/day: 1.00     Average packs/day: 1 pack/day for 46.4 years (46.4 ttl pk-yrs)     Types: Cigarettes     Start date: 1/1/1979     Passive exposure: Current    Smokeless tobacco: Never    Tobacco comments:     Declined QP 05/13/2020   Vaping Use    Vaping status: Never Used   Substance and Sexual Activity    Alcohol use: No     Alcohol/week: 0.0 standard drinks of alcohol    Drug use: No    Sexual activity: Yes   Other Topics Concern    Parent/sibling w/ CABG, MI or angioplasty before 65F 55M? 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     Social Drivers of Health     Financial Resource Strain: Low Risk  (4/11/2025)    Financial Resource Strain     Within the past 12 months, have you or your family members you live with been unable to get utilities (heat, electricity) when it was really needed?: No   Food Insecurity: Low Risk  (4/11/2025)    Food Insecurity     Within the past 12 months, did you worry that your food would run out before you got money to buy more?: No     Within the past 12 months, did the food you bought just not last and you didn t have money to get more?: No   Transportation Needs: Low Risk  (4/11/2025)    Transportation Needs     Within the past 12 months, has lack of transportation kept you from medical appointments, getting your medicines, non-medical meetings or appointments, work, or from getting things that you need?: No   Physical Activity: Unknown (5/16/2024)    Exercise Vital Sign     Days of Exercise per Week: 0 days     Minutes of Exercise per Session: Not on file   Stress: No Stress Concern Present (5/16/2024)    [place] [employer] -  Occupational Stress Questionnaire     Feeling of Stress : Not at all   Social Connections: Unknown (5/16/2024)    Social Connection and Isolation Panel [NHANES]     Frequency of Communication with Friends and Family: Not on file     Frequency of Social Gatherings with Friends and Family: More than three times a week     Attends Voodoo Services: Not on file     Active Member of Clubs or Organizations: Not on file     Attends Club or Organization Meetings: Not on file     Marital Status: Not on file   Interpersonal Safety: Low Risk  (4/11/2025)    Interpersonal Safety     Do you feel physically and emotionally safe where you currently live?: Yes     Within the past 12 months, have you been hit, slapped, kicked or otherwise physically hurt by someone?: No     Within the past 12 months, have you been humiliated or emotionally abused in other ways by your partner or ex-partner?: No   Housing Stability: Low Risk  (4/11/2025)    Housing Stability     Do you have housing? : Yes     Are you worried about losing your housing?: No        Physical Exam  Vitals: /74 (BP Location: Right arm)   Pulse 108   SpO2 94%   Weight is 0 lbs 0 oz          There is no height or weight on file to calculate BMI.  General: This is a 59 year old female who appears their reported age, not in acute distress  Head: normocephalic, Atraumatic; not wearing glasses; non-icteric sclera; no exudate; mild hearing loss   Respiratory:  unlabored breathing; no cough   Skin: Uniformly warm and dry  Psych: Alert and oriented x2; calm and cooperative throughout exam  Abdomen: Normal bowel sounds. Soft, symmetric, no guarding or rigidity, nontender with palpation.  No organomegaly or masses palpated.   Extremities: Wound #1 Location: right groin  Size: 5.5L x 13.5W x 0.1depth.  no sinus tract present, Wound base: slough; hypergranulation  no undermining present. Wound is full thickness. There is moderate drainage. Periwound: no denudement, erythema,  "induration, maceration or warmth.  Surrounding skin breakdown and yeast rash from wound vac      Circumferential volume measures:             No data to display                Ulceration(s)/Wound(s):     VASC Wound Rt groin (Active)   Pre Size Length 5.5 05/20/25 1400   Pre Size Width 13.5 05/20/25 1400   Pre Size Depth 0.3 05/20/25 1400   Pre Total Sq cm 74.25 05/20/25 1400       Laboratory studies:     I personally reviewed the following lab results today and those on care everywhere, if indicated     CRP Inflammation   Date Value Ref Range Status   07/01/2019 <2.9 0.0 - 8.0 mg/L Final      No results found for: \"SED\"   Last Renal Panel:  Sodium   Date Value Ref Range Status   05/15/2025 137 135 - 145 mmol/L Final   06/23/2021 138 133 - 144 mmol/L Final     Potassium   Date Value Ref Range Status   05/15/2025 4.4 3.4 - 5.3 mmol/L Final   07/15/2022 4.1 3.4 - 5.3 mmol/L Final   06/23/2021 4.7 3.4 - 5.3 mmol/L Final     Comment:     Specimen slightly hemolyzed, potassium may be falsely elevated     Potassium POCT   Date Value Ref Range Status   05/05/2025 4.0 3.4 - 5.3 mmol/L Final     Chloride   Date Value Ref Range Status   05/15/2025 98 98 - 107 mmol/L Final   07/15/2022 103 94 - 109 mmol/L Final   06/23/2021 106 94 - 109 mmol/L Final     Carbon Dioxide   Date Value Ref Range Status   06/23/2021 18 (L) 20 - 32 mmol/L Final     Carbon Dioxide (CO2)   Date Value Ref Range Status   05/15/2025 25 22 - 29 mmol/L Final   07/15/2022 31 20 - 32 mmol/L Final     Anion Gap   Date Value Ref Range Status   05/15/2025 14 7 - 15 mmol/L Final   07/15/2022 3 3 - 14 mmol/L Final   06/23/2021 14 3 - 14 mmol/L Final     Glucose   Date Value Ref Range Status   05/15/2025 164 (H) 70 - 99 mg/dL Final   07/15/2022 249 (H) 70 - 99 mg/dL Final   06/23/2021 156 (H) 70 - 99 mg/dL Final     GLUCOSE BY METER POCT   Date Value Ref Range Status   05/09/2025 208 (H) 70 - 99 mg/dL Final     Urea Nitrogen   Date Value Ref Range Status " "  05/15/2025 48.6 (H) 8.0 - 23.0 mg/dL Final   07/15/2022 17 7 - 30 mg/dL Final   06/23/2021 27 7 - 30 mg/dL Final     Creatinine   Date Value Ref Range Status   05/15/2025 1.25 (H) 0.51 - 0.95 mg/dL Final   06/23/2021 1.07 (H) 0.52 - 1.04 mg/dL Final     GFR Estimate   Date Value Ref Range Status   05/15/2025 49 (L) >60 mL/min/1.73m2 Final     Comment:     eGFR calculated using 2021 CKD-EPI equation.   06/23/2021 57 (L) >60 mL/min/[1.73_m2] Final     Comment:     Non  GFR Calc  Starting 12/18/2018, serum creatinine based estimated GFR (eGFR) will be   calculated using the Chronic Kidney Disease Epidemiology Collaboration   (CKD-EPI) equation.       GFR, ESTIMATED POCT   Date Value Ref Range Status   04/10/2024 47 (L) >60 mL/min/1.73m2 Final     Calcium   Date Value Ref Range Status   05/15/2025 10.0 8.8 - 10.4 mg/dL Final   06/23/2021 9.9 8.5 - 10.1 mg/dL Final     Phosphorus   Date Value Ref Range Status   05/09/2025 4.1 2.5 - 4.5 mg/dL Final     Albumin   Date Value Ref Range Status   05/15/2025 3.8 3.5 - 5.2 g/dL Final   07/15/2022 3.5 3.4 - 5.0 g/dL Final   05/10/2021 3.6 3.4 - 5.0 g/dL Final      Lab Results   Component Value Date    WBC 10.6 05/15/2025    WBC 7.5 11/09/2020     Lab Results   Component Value Date    RBC 4.16 05/15/2025    RBC 5.60 11/09/2020     Lab Results   Component Value Date    HGB 12.5 05/15/2025    HGB 16.3 03/11/2021     Lab Results   Component Value Date    HCT 39.2 05/15/2025    HCT 50.1 11/09/2020     No components found for: \"MCT\"  Lab Results   Component Value Date    MCV 94 05/15/2025    MCV 90 11/09/2020     Lab Results   Component Value Date    MCH 30.0 05/15/2025    MCH 29.8 11/09/2020     Lab Results   Component Value Date    MCHC 31.9 05/15/2025    MCHC 33.3 11/09/2020     Lab Results   Component Value Date    RDW 13.8 05/15/2025    RDW 12.9 11/09/2020     Lab Results   Component Value Date     05/15/2025     11/09/2020      Lab Results "   Component Value Date    A1C 12.7 03/22/2025    A1C 9.6 12/05/2024    A1C 9.7 05/16/2024    A1C 10.2 02/16/2024    A1C 11.0 11/15/2023    A1C >14.0 05/10/2021    A1C 10.7 02/10/2021    A1C 7.6 11/09/2020    A1C 7.9 08/07/2020    A1C 8.1 05/04/2020      TSH   Date Value Ref Range Status   03/18/2025 1.18 0.30 - 4.20 uIU/mL Final   12/04/2019 0.97 0.40 - 4.00 mU/L Final      Lab Results   Component Value Date    VITDT 46 05/15/2025    VITDT 49 07/19/2023    VITDT 46 01/17/2023    VITDT 45 07/01/2019          Impression:    Encounter Diagnoses   Name Primary?    Groin abscess Yes    Diabetic polyneuropathy associated with diabetes mellitus due to underlying condition (H)     Intellectual disability     Memory disturbance     Type 2 diabetes, HbA1c goal < 7% (H)      5/20/2025 right groin        Assessment/Plan:  1. Debridement: Informed Consent:  Patient acknowledges that I have explained the patient's general medical condition to him/her.  Patient has been informed and acknowledges that I have explained the risks or complications of wound debridement including, but not limited to, scarring, damage to surrounding areas such as nerves, allergic reactions to topical and injected anesthetic and/or skin prep solutions.  Other risks include excessive bleeding, removal of healthy tissue, pain and inflammation. Patient acknowledges that bleeding after debridement and pain may worsen after debridement . Patient acknowledges that I have explained that the wound may be larger after debridement.  Patient acknowledges that they may need serial debridement while under care in the wound department.  Patient acknowledges that they were given an opportunity to ask questions about treatment, and I have answered patient's questions    Nursing staff remove the old dressing and cleanse the wound and surrounding skin with recommended solution. After discussion of risk factors and verbal consent was obtained 2% Lidocaine HCL jelly was  applied, under clean conditions, the right groin ulceration(s) were debrided using currette or iris. Devitalized and nonviable tissue, along with any fibrin and slough, was removed to improve granulation tissue formation, stimulate wound healing, decrease overall bacteria load, disrupt biofilm formation and decrease edge senescence.  Total excisional debridement was 74.25 sq cm from the epidermis/dermis area or into the subcutaneous tissue with a depth of 0.1 cm.   Ulcers were improved afterwards and .  Measures were unchanged after debridement. Was additionally treated with silver nitrate to the hypergranulaton    2. Treatment: wound treatment will include irrigation and dressings to promote autolytic debridement which will include: wound is so small we will stop the wound vac and go to silvercel; bordered foam; change every 1-2 days due to location; ok for patient to shower; do wound cares right after; will prescribe clotrimazole for the yeast rash.    If for some reason the patient is not able to get your dressing(s) changed as outlined above (due to illness, lack of supplies, lack of help) please do the following: remove old, soiled dressings; wash the ulcers with saline; pat dry; apply ABD pad or other absorbant pad and secure with rolled gauze; avoid tape directly on your skin; Patient instructed to call the clinic as soon as possible to let us know what the current issues are in receiving ulcer care.    3. Secondary Lymphedema &Edema: na    4. Offloading: na    5. Nutrition: protein shake; focus on diabetes control    6. Wound Etiology: abscess    Patient to return to clinic in 3 week(s) for re-evaluation. They were instructed to call the clinic sooner with any further questions or concerns. Answered all questions.          Jessica Tapia NP   [facility]  [phone]      This note was electronically signed by Jessica Tapia NP

## 2025-05-20 NOTE — PATIENT INSTRUCTIONS
Can stop wound vac    Has yeast rash to periwound; I have prescribed clotrimazole       Ok to shower; do wound care right after bathing  Ok to use a hair dry on cool setting to groin area after showering to ensure the skin is nice and dry    Resume protein supplements per facility protocol    Wound care supplies were not ordered or needed today. Facility to provide        Wound Care Instructions    Every 1-2 days and prn , Cleanse your right groin wound(s) with Dilute hibiclens 30cc in 500cc NS.    Pat Dry with non-sterile gauze    Apply small amout of clotrimazole cream to the periwound rash use 1/2 pea sized amount    Primary Dressing: Apply silvercel or aquacel ag into/onto the wounds    Secondary dressing: Cover with bordered foam; such as 4x8 mepilex border    Compression: na    It is ok to get your wound wet in the bath or shower      If for some reason you are not able to get your dressing(s) changed as outlined above (due to illness, lack of supplies, lack of help) please do the following: remove old, soiled dressings; wash the wounds with saline; pat dry; apply ABD pad or other absorbant pad and secure with rolled gauze; avoid tape directly on your skin; Call the clinic as soon as possible to let us know what the current issues are in receiving wound care [phone].      SEEK MEDICAL CARE IF:  You have an increase in swelling, pain, or redness around the wound.  You have an increase in the amount of pus coming from the wound.  There is a bad smell coming from the wound.  The wound appears to be worsening/enlarging  You have a fever greater than 101.5 F      It is ok to continue current wound care treatment/products for the next 2-3 days until new wound care supplies are ordered and arrive. If longer than this please contact our office at [phone].    If you have a 2 layer or 4 layer compression wrap on these are safe to have on for ONLY 7 days. If for some reason you are not able to get the wrap(s)  changed (due to illness; lack of supplies, lack of help, lack of transportation) please do the following: unwrap the old 2 or 4 layer compression wrap; avoid using scissors as you could cut your skin and cause wounds; use tubular compression when available. Call to reschedule your home care or clinic visit appointment as soon as possible.    Please NOTE: if you are 15 minutes late to your arrival time, you will have to be rescheduled. Please call our clinic as soon as possible if you know you will not be able to get to your appointment at [phone]. If you fail to show up to 3 scheduled clinic appointments you will be dismissed from our clinic.              We want to hear from you!  In the next few weeks, you should receive a call or email to complete a survey about your visit at [facility]. Please help us improve your appointment experience by letting us know how we did today. We strive to make your experience good and value any ways in which we could do better.      We value your input and suggestions.    Thank you for choosing the [facility]!

## 2025-05-20 NOTE — PROGRESS NOTES
[facility] GERIATRICS    Chief Complaint   Patient presents with    RECHECK     HPI:  Marly Cannon is a 59 year old  (1965), who is being seen today for an episodic care visit at: [facility]) [[id number]]. Today's concern is: The primary encounter diagnosis was Physical deconditioning. Diagnoses of Acute on chronic respiratory failure with hypoxia and hypercapnia (H), Generalized muscle weakness, History of pneumonia, Chronic obstructive pulmonary disease with acute exacerbation (H), ADEEL (obstructive sleep apnea), Perineal abscess, Condyloma acuminata, Acute pulmonary embolism, unspecified pulmonary embolism type, unspecified whether acute cor pulmonale present (H), Pulmonary emphysema, unspecified emphysema type (H), Type 2 diabetes, HbA1c goal < 7% (H), Diabetic polyneuropathy associated with diabetes mellitus due to underlying condition (H), YADIRA (acute kidney injury), Chronic kidney disease, stage 4 (severe) (H), PAF (paroxysmal atrial fibrillation) (H), Essential hypertension, Hyperlipidemia, unspecified hyperlipidemia type, Unable to care for self, Intellectual delay, Seborrheic dermatitis, Reactive thrombocytosis, and Open wound were also pertinent to this visit.    Met with patient who denies any chest pain, palpitations, shortness of breath, RUBY, lightheadedness, dizziness, or cough. Compliant with oxygen at night. Denies any abdominal discomfort. Denies N&V. Denies B&B concerns. Denies dysuria or frequency. Denies loose or constipation. Appetite good. Sleeping well. No pain complaints. She is very happy to no longer have any noguera catheter or wound vac. PVR thus far have been <200. Urinating fine without issues reported. She is aware that  will send out new referrals for AVERY placements in [place] or [place] per her preference.     BP Readings from Last 3 Encounters:   05/21/25 104/59   05/20/25 106/74   05/19/25 122/71     Wt Readings from Last 5  "Encounters:   05/21/25 83 kg (183 lb)   05/19/25 83.6 kg (184 lb 6.4 oz)   05/15/25 83.6 kg (184 lb 6.4 oz)   05/14/25 83.6 kg (184 lb 6.4 oz)   05/12/25 83.6 kg (184 lb 6.4 oz)     Allergies, and PMH/PSH reviewed in EPIC today.  REVIEW OF SYSTEMS:  Limited secondary to cognitive impairment but today pt reports as noted per HPI    Objective:   /59   Pulse 95   Temp 97.7  F (36.5  C)   Resp 18   Ht 1.651 m (5' 5\")   Wt 83 kg (183 lb)   SpO2 98%   BMI 30.45 kg/m    GENERAL APPEARANCE:  Alert, in no distress, cooperative  ENT:  Mouth and posterior oropharynx normal, moist mucous membranes, [place]  EYES:  EOM, conjunctivae, lids, pupils and irises normal  NECK:  No adenopathy,masses or thyromegaly  RESP:  respiratory effort and palpation of chest normal, lungs clear to auscultation , no respiratory distress  CV:  regular rate and rhythm, no murmur, rub, or gallop, peripheral edema 1+ in BLE  ABDOMEN:  normal bowel sounds, soft, nontender, no hepatosplenomegaly or other masses, no guarding or rebound  M/S:   ambulates with walker. Staff to assist for all ADLs, transfers and cares  SKIN:  Inspection of skin and subcutaneous tissue baseline, wound healing well, no signs of infection stable per chart review  NEURO:   Cranial nerves 2-12 are normal tested and grossly at patient's baseline, no purposeful movement in upper and lower extremities  PSYCH:  oriented X 3, memory impaired , affect and mood normal    Most Recent 3 CBC's:  Recent Labs   Lab Test 05/21/25  1152 05/15/25  0850 05/09/25  0619   WBC 9.3 10.6 8.8   HGB 12.3 12.5 11.7   MCV 91 94 93    425 380     Most Recent 3 BMP's:  Recent Labs   Lab Test 05/21/25  1152 05/15/25  0850 05/09/25  1208 05/09/25  0811 05/09/25  0619   * 137  --   --  140   POTASSIUM 4.4 4.4  --   --  4.5   CHLORIDE 97* 98  --   --  102   CO2 23 25  --   --  31*   BUN 41.4* 48.6*  --   --  47.4*   CR 1.56* 1.25*  --   --  1.18*   ANIONGAP 14 14  --   --  7   NORM 9.7 " 10.0  --   --  9.7   * 164* 208*   < > 132*    < > = values in this interval not displayed.     Most Recent 2 LFT's:  Recent Labs   Lab Test 05/15/25  0850 05/05/25  0654   AST 24 15   ALT 11 13   ALKPHOS 96 87   BILITOTAL 0.6 0.4     Most Recent Hemoglobin A1c:  Recent Labs   Lab Test 03/22/25  1731   A1C 12.7*     Most Recent Anemia Panel:  Recent Labs   Lab Test 05/21/25  1152 05/15/25  0850 04/23/25  0623 04/21/25  0814 04/07/25  0542 04/06/25  0431 04/05/25  0451 04/19/23  1510 01/17/23  1505   WBC 9.3 10.6   < > 9.7   < > 7.3 7.7   < > 10.1   HGB 12.3 12.5   < > 11.2*   < > 10.1* 9.9*   < > 17.1*   HCT 38.1 39.2   < > 34.8*   < > 31.2* 32.5*   < > 50.1*   MCV 91 94   < > 96   < > 94 96   < > 88    425   < > 650*   < > 282 292   < > 263   IRON  --   --   --   --   --   --  51  --   --    IRONSAT  --   --   --   --   --   --  27  --   --    RETICABSCT  --   --   --  0.116*  --   --   --   --   --    RETP  --   --   --  3.2*  --   --   --   --   --    FEB  --   --   --   --   --   --  188*  --   --    OLIVA  --   --   --   --   --   --  407*  --   --    B12  --  559  --   --   --   --  859   < >  --    FOLIC  --   --   --   --   --  4.9  --   --   --    EPOE  --   --   --   --   --   --   --   --  7    < > = values in this interval not displayed.       ASSESSMENT/PLAN:  Physical deconditioning  Generalized muscle weakness  Unable to care for self  Intellectual delay  Comment: Chronic. S/T prolonged hospitalization. Per therapy- Patient requires set up for UB needs, ModA for LB needs. Ambulates up to 150ft 2WW CGA.   Plan:  -Continue Physical therapy and Occupational therapy as directed  -SW to remain involved for safe discharge planning needs  -Recommend LTC placement post TCU     Acute on Chronic Hypoxic Respiratory Failure  History of VAP  COPD  ADEEL  PE, Acute, segmental, subsegmental  Pulmonary emphysema  Comment: Acute on chronic. Admitted to [facility] on 3/22/25: She had a repeat CTA that  demonstrated progression of her PE and RLL collapse concerning for mucous plugging and pneumonia. Primary team has been having issues w/ progressively increasing FiO2 needs. She had a sputum culture grow Candida & has been on fluconazole since 3/25. Transferred to [facility] d/t concerns that she might require a tracheostomy secondary to her inability to liberate from the vent. At [facility] patient was admitted to the ICU on a ventilator, successfully extubated 4/1 and downgraded to floor status 4/3. Infectious disease consulted and continued patient on Zosyn course to be completed through 4/17/2025. Patient was able to liberate from ventilator withOUT tracheostomy formation.   Plan:  -Monitor respiratory status  -Oxygen 3L via oxymask overnights.   -Encourage incentive spirometry every 4 hours while awake  -Continue apixaban 5mg BID  -Continue albuterol nebulization every 2 hour PRN  -Continue umeclidinium-vilanterol (anoro ellipta) 62.5-25mcg inhaler daily  -BMP and CBC due 5/21/25--results pending  -CMP and CBC due 5/28/25     Perineal Abscess  Condyloma Acuminata  Comment: Acute on chronic. Per recent hospitalization-General surgery consulted for right keiry/perineal abscess and performed incision and drainage with Penrose drain placement 3/31 with subsequent repeat I&D 4/2 and 4/8. s/p acyclovir and I&D x3. Noted on 4/28/25-5/4/25: Wound vac remains--penrose drain removed--packing to tract along/under R labia.   Plan:  -Monitor for worsening s/symptoms of concerns  -Air mattress while on site  -Follow up with wound clinic as directed. Scheduled for 6/10/25  -Continue PVR Q shift x 3 days. St cath if PVR>450. So far results have been <200  -Monitor pain complaints  -Continue Tylenol PRN  -BMP and CBC due 5/21/25--results pending  -CMP and CBC due 5/28/25  -Continue wound care as directed per vascular  *Change every other day and PRN  Cleanse your right groin wound(s) with Dilute hibiclens 30cc  in 500cc NS.  Pat Dry with non-sterile gauze  Apply small amout of clotrimazole cream to the periwound rash use 1/2 pea sized amount  Primary Dressing: Apply silvercel or aquacel ag into/onto the wounds  Secondary dressing: Cover with bordered foam; such as 4x8 mepilex border  It is ok to get your wound wet in the bath or shower    Seborrheic dermatitis:   Comment: Acute on chronic. Noted to face PTA  Plan:  -Monitor for worsening s/symptoms of concerns     T2DM  Poorly controlled as outpatient with A1c 12.7 in march 2025. Used to be on insulin pump. No longer on it due to intellectual delay.  Plan:  -Reduce Blood Glucose down to BID using Freestyle Bud 2 device. Change device every 14 days and PRN. Update provider if Blood Glucose<70 and.or >450  -Discontinue insulin aspart sliding scale orders  -Continue jardiance 25mg daily  -Monitor hgb A1c every 3 months. Due June 2025  -Discontinue insulin aspart 10 units with meals  -Continue insulin glargine 38 units at HS. HOLD if Blood Glucose<120  -BMP and CBC due 5/21/25--results pending  -CMP and CBC due 5/28/25         CKD stage 4  YADIRA  Comment: Chronic. Baseline creatinine~1.1-1.3  Plan:  -Monitor urinary status  -Monitor for worsening s/symptoms of concerns  -BMP and CBC due 5/21/25--results pending  -CMP and CBC due 5/28/25     Essential hypertension   Hyperlipidemia, unspecified hyperlipidemia type   Comment: Chronic. Based on JNC-8 goals,  patients age of 59 year old, presence of diabetes or CKD, and goals of care goal BP is  <140/90 mm Hg. Patient is stable with current plan of care and routine assessment..  Plan:  -Monitor BP and HR as directed  -Continue amlodipine 5mg daily. HOLD if SBP<100  -Continue atorvastatin 40mg daily  -Continue apixaban 5mg BID  -BMP and CBC due 5/21/25--results pending  -CMP and CBC due 5/28/25     Reactive thrombocytosis  Comment: Chronic. Platelets historically in normal range (reviewed last 3 years). Peaked at 700,000, now  resolved--Likely  reactive - reviewed flow chart for acute thrombocytosis, posted in note. Normal peripheral smear, liver enzymes  Plan;  -Monitor bleeding risks  -Monitor for worsening s/symptoms of concerns  -BMP and CBC due 5/21/25--results pending  -CMP and CBC due 5/28/25     Electronically signed by:  Dr. Jayshree Montenegro DNP, APRN, FNP-C, WCS-C, EDS-C     Provider reviewed records from facility, and interpreted most recent imaging/lab work, and vital signs.   Acute and chronic conditions managed by writer. Have been reviewed during today's exam.

## 2025-05-21 ENCOUNTER — TRANSITIONAL CARE UNIT VISIT (OUTPATIENT)
Dept: GERIATRICS | Facility: CLINIC | Age: 60
End: 2025-05-21
Payer: COMMERCIAL

## 2025-05-21 VITALS
DIASTOLIC BLOOD PRESSURE: 59 MMHG | SYSTOLIC BLOOD PRESSURE: 104 MMHG | WEIGHT: 183 LBS | HEART RATE: 95 BPM | HEIGHT: 65 IN | BODY MASS INDEX: 30.49 KG/M2 | RESPIRATION RATE: 18 BRPM | OXYGEN SATURATION: 98 % | TEMPERATURE: 97.7 F

## 2025-05-21 DIAGNOSIS — A63.0 CONDYLOMA ACUMINATA: ICD-10-CM

## 2025-05-21 DIAGNOSIS — L21.9 SEBORRHEIC DERMATITIS: ICD-10-CM

## 2025-05-21 DIAGNOSIS — J43.9 PULMONARY EMPHYSEMA, UNSPECIFIED EMPHYSEMA TYPE (H): ICD-10-CM

## 2025-05-21 DIAGNOSIS — Z87.01 HISTORY OF PNEUMONIA: ICD-10-CM

## 2025-05-21 DIAGNOSIS — N17.9 AKI (ACUTE KIDNEY INJURY): ICD-10-CM

## 2025-05-21 DIAGNOSIS — E08.42 DIABETIC POLYNEUROPATHY ASSOCIATED WITH DIABETES MELLITUS DUE TO UNDERLYING CONDITION (H): ICD-10-CM

## 2025-05-21 DIAGNOSIS — I26.99 ACUTE PULMONARY EMBOLISM, UNSPECIFIED PULMONARY EMBOLISM TYPE, UNSPECIFIED WHETHER ACUTE COR PULMONALE PRESENT (H): ICD-10-CM

## 2025-05-21 DIAGNOSIS — F81.9 INTELLECTUAL DELAY: ICD-10-CM

## 2025-05-21 DIAGNOSIS — E11.9 TYPE 2 DIABETES, HBA1C GOAL < 7% (H): ICD-10-CM

## 2025-05-21 DIAGNOSIS — T14.8XXA OPEN WOUND: ICD-10-CM

## 2025-05-21 DIAGNOSIS — D75.838 REACTIVE THROMBOCYTOSIS: ICD-10-CM

## 2025-05-21 DIAGNOSIS — M62.81 GENERALIZED MUSCLE WEAKNESS: ICD-10-CM

## 2025-05-21 DIAGNOSIS — Z78.9 UNABLE TO CARE FOR SELF: ICD-10-CM

## 2025-05-21 DIAGNOSIS — J96.22 ACUTE ON CHRONIC RESPIRATORY FAILURE WITH HYPOXIA AND HYPERCAPNIA (H): ICD-10-CM

## 2025-05-21 DIAGNOSIS — I48.0 PAF (PAROXYSMAL ATRIAL FIBRILLATION) (H): ICD-10-CM

## 2025-05-21 DIAGNOSIS — G47.33 OSA (OBSTRUCTIVE SLEEP APNEA): ICD-10-CM

## 2025-05-21 DIAGNOSIS — I10 ESSENTIAL HYPERTENSION: ICD-10-CM

## 2025-05-21 DIAGNOSIS — R53.81 PHYSICAL DECONDITIONING: Primary | ICD-10-CM

## 2025-05-21 DIAGNOSIS — J44.1 CHRONIC OBSTRUCTIVE PULMONARY DISEASE WITH ACUTE EXACERBATION (H): ICD-10-CM

## 2025-05-21 DIAGNOSIS — L02.215 PERINEAL ABSCESS: ICD-10-CM

## 2025-05-21 DIAGNOSIS — N18.4 CHRONIC KIDNEY DISEASE, STAGE 4 (SEVERE) (H): ICD-10-CM

## 2025-05-21 DIAGNOSIS — J96.21 ACUTE ON CHRONIC RESPIRATORY FAILURE WITH HYPOXIA AND HYPERCAPNIA (H): ICD-10-CM

## 2025-05-21 DIAGNOSIS — E78.5 HYPERLIPIDEMIA, UNSPECIFIED HYPERLIPIDEMIA TYPE: ICD-10-CM

## 2025-05-21 LAB
ANION GAP SERPL CALCULATED.3IONS-SCNC: 14 MMOL/L (ref 7–15)
BASOPHILS # BLD AUTO: 0.1 10E3/UL (ref 0–0.2)
BASOPHILS NFR BLD AUTO: 1 %
BUN SERPL-MCNC: 41.4 MG/DL (ref 8–23)
CALCIUM SERPL-MCNC: 9.7 MG/DL (ref 8.8–10.4)
CHLORIDE SERPL-SCNC: 97 MMOL/L (ref 98–107)
CREAT SERPL-MCNC: 1.56 MG/DL (ref 0.51–0.95)
EGFRCR SERPLBLD CKD-EPI 2021: 37 ML/MIN/1.73M2
EOSINOPHIL # BLD AUTO: 0.2 10E3/UL (ref 0–0.7)
EOSINOPHIL NFR BLD AUTO: 3 %
ERYTHROCYTE [DISTWIDTH] IN BLOOD BY AUTOMATED COUNT: 13.7 % (ref 10–15)
GLUCOSE SERPL-MCNC: 218 MG/DL (ref 70–99)
HCO3 SERPL-SCNC: 23 MMOL/L (ref 22–29)
HCT VFR BLD AUTO: 38.1 % (ref 35–47)
HGB BLD-MCNC: 12.3 G/DL (ref 11.7–15.7)
IMM GRANULOCYTES # BLD: 0 10E3/UL
IMM GRANULOCYTES NFR BLD: 0 %
LYMPHOCYTES # BLD AUTO: 2.9 10E3/UL (ref 0.8–5.3)
LYMPHOCYTES NFR BLD AUTO: 31 %
MCH RBC QN AUTO: 29.3 PG (ref 26.5–33)
MCHC RBC AUTO-ENTMCNC: 32.3 G/DL (ref 31.5–36.5)
MCV RBC AUTO: 91 FL (ref 78–100)
MONOCYTES # BLD AUTO: 0.9 10E3/UL (ref 0–1.3)
MONOCYTES NFR BLD AUTO: 9 %
NEUTROPHILS # BLD AUTO: 5.2 10E3/UL (ref 1.6–8.3)
NEUTROPHILS NFR BLD AUTO: 56 %
NRBC # BLD AUTO: 0 10E3/UL
NRBC BLD AUTO-RTO: 0 /100
PLATELET # BLD AUTO: 382 10E3/UL (ref 150–450)
POTASSIUM SERPL-SCNC: 4.4 MMOL/L (ref 3.4–5.3)
RBC # BLD AUTO: 4.2 10E6/UL (ref 3.8–5.2)
SODIUM SERPL-SCNC: 134 MMOL/L (ref 135–145)
WBC # BLD AUTO: 9.3 10E3/UL (ref 4–11)

## 2025-05-21 PROCEDURE — 99309 SBSQ NF CARE MODERATE MDM 30: CPT | Performed by: NURSE PRACTITIONER

## 2025-05-21 PROCEDURE — 36415 COLL VENOUS BLD VENIPUNCTURE: CPT | Mod: ORL | Performed by: NURSE PRACTITIONER

## 2025-05-21 PROCEDURE — 80048 BASIC METABOLIC PNL TOTAL CA: CPT | Mod: ORL | Performed by: NURSE PRACTITIONER

## 2025-05-21 PROCEDURE — 85025 COMPLETE CBC W/AUTO DIFF WBC: CPT | Mod: ORL | Performed by: NURSE PRACTITIONER

## 2025-05-21 NOTE — LETTER
5/21/2025      Marly Cannon  [address]  [facility] [address]        [facility] GERIATRICS    Chief Complaint   Patient presents with     RECHECK     HPI:  Marly Cannon is a 59 year old  (1965), who is being seen today for an episodic care visit at: [facility] ([facility]) [[id number]]. Today's concern is: The primary encounter diagnosis was Physical deconditioning. Diagnoses of Acute on chronic respiratory failure with hypoxia and hypercapnia (H), Generalized muscle weakness, History of pneumonia, Chronic obstructive pulmonary disease with acute exacerbation (H), ADEEL (obstructive sleep apnea), Perineal abscess, Condyloma acuminata, Acute pulmonary embolism, unspecified pulmonary embolism type, unspecified whether acute cor pulmonale present (H), Pulmonary emphysema, unspecified emphysema type (H), Type 2 diabetes, HbA1c goal < 7% (H), Diabetic polyneuropathy associated with diabetes mellitus due to underlying condition (H), YADIRA (acute kidney injury), Chronic kidney disease, stage 4 (severe) (H), PAF (paroxysmal atrial fibrillation) (H), Essential hypertension, Hyperlipidemia, unspecified hyperlipidemia type, Unable to care for self, Intellectual delay, Seborrheic dermatitis, Reactive thrombocytosis, and Open wound were also pertinent to this visit.    Met with patient who denies any chest pain, palpitations, shortness of breath, RUBY, lightheadedness, dizziness, or cough. Compliant with oxygen at night. Denies any abdominal discomfort. Denies N&V. Denies B&B concerns. Denies dysuria or frequency. Denies loose or constipation. Appetite good. Sleeping well. No pain complaints. She is very happy to no longer have any noguera catheter or wound vac. PVR thus far have been <200. Urinating fine without issues reported. She is aware that  will send out new referrals for AVERY placements in [place] or [place] per her preference.     BP Readings from Last 3 Encounters:   05/21/25  "104/59   05/20/25 106/74   05/19/25 122/71     Wt Readings from Last 5 Encounters:   05/21/25 83 kg (183 lb)   05/19/25 83.6 kg (184 lb 6.4 oz)   05/15/25 83.6 kg (184 lb 6.4 oz)   05/14/25 83.6 kg (184 lb 6.4 oz)   05/12/25 83.6 kg (184 lb 6.4 oz)     Allergies, and PMH/PSH reviewed in EPIC today.  REVIEW OF SYSTEMS:  Limited secondary to cognitive impairment but today pt reports as noted per HPI    Objective:   /59   Pulse 95   Temp 97.7  F (36.5  C)   Resp 18   Ht 1.651 m (5' 5\")   Wt 83 kg (183 lb)   SpO2 98%   BMI 30.45 kg/m    GENERAL APPEARANCE:  Alert, in no distress, cooperative  ENT:  Mouth and posterior oropharynx normal, moist mucous membranes, [place]  EYES:  EOM, conjunctivae, lids, pupils and irises normal  NECK:  No adenopathy,masses or thyromegaly  RESP:  respiratory effort and palpation of chest normal, lungs clear to auscultation , no respiratory distress  CV:  regular rate and rhythm, no murmur, rub, or gallop, peripheral edema 1+ in BLE  ABDOMEN:  normal bowel sounds, soft, nontender, no hepatosplenomegaly or other masses, no guarding or rebound  M/S:   ambulates with walker. Staff to assist for all ADLs, transfers and cares  SKIN:  Inspection of skin and subcutaneous tissue baseline, wound healing well, no signs of infection stable per chart review  NEURO:   Cranial nerves 2-12 are normal tested and grossly at patient's baseline, no purposeful movement in upper and lower extremities  PSYCH:  oriented X 3, memory impaired , affect and mood normal    Most Recent 3 CBC's:  Recent Labs   Lab Test 05/21/25  1152 05/15/25  0850 05/09/25  0619   WBC 9.3 10.6 8.8   HGB 12.3 12.5 11.7   MCV 91 94 93    425 380     Most Recent 3 BMP's:  Recent Labs   Lab Test 05/21/25  1152 05/15/25  0850 05/09/25  1208 05/09/25  0811 05/09/25  0619   * 137  --   --  140   POTASSIUM 4.4 4.4  --   --  4.5   CHLORIDE 97* 98  --   --  102   CO2 23 25  --   --  31*   BUN 41.4* 48.6*  --   --  47.4*   CR " 1.56* 1.25*  --   --  1.18*   ANIONGAP 14 14  --   --  7   NORM 9.7 10.0  --   --  9.7   * 164* 208*   < > 132*    < > = values in this interval not displayed.     Most Recent 2 LFT's:  Recent Labs   Lab Test 05/15/25  0850 05/05/25  0654   AST 24 15   ALT 11 13   ALKPHOS 96 87   BILITOTAL 0.6 0.4     Most Recent Hemoglobin A1c:  Recent Labs   Lab Test 03/22/25  1731   A1C 12.7*     Most Recent Anemia Panel:  Recent Labs   Lab Test 05/21/25  1152 05/15/25  0850 04/23/25  0623 04/21/25  0814 04/07/25  0542 04/06/25  0431 04/05/25  0451 04/19/23  1510 01/17/23  1505   WBC 9.3 10.6   < > 9.7   < > 7.3 7.7   < > 10.1   HGB 12.3 12.5   < > 11.2*   < > 10.1* 9.9*   < > 17.1*   HCT 38.1 39.2   < > 34.8*   < > 31.2* 32.5*   < > 50.1*   MCV 91 94   < > 96   < > 94 96   < > 88    425   < > 650*   < > 282 292   < > 263   IRON  --   --   --   --   --   --  51  --   --    IRONSAT  --   --   --   --   --   --  27  --   --    RETICABSCT  --   --   --  0.116*  --   --   --   --   --    RETP  --   --   --  3.2*  --   --   --   --   --    FEB  --   --   --   --   --   --  188*  --   --    OLIVA  --   --   --   --   --   --  407*  --   --    B12  --  559  --   --   --   --  859   < >  --    FOLIC  --   --   --   --   --  4.9  --   --   --    EPOE  --   --   --   --   --   --   --   --  7    < > = values in this interval not displayed.       ASSESSMENT/PLAN:  Physical deconditioning  Generalized muscle weakness  Unable to care for self  Intellectual delay  Comment: Chronic. S/T prolonged hospitalization. Per therapy- Patient requires set up for UB needs, ModA for LB needs. Ambulates up to 150ft 2WW CGA.   Plan:  -Continue Physical therapy and Occupational therapy as directed  -SW to remain involved for safe discharge planning needs  -Recommend LTC placement post TCU     Acute on Chronic Hypoxic Respiratory Failure  History of VAP  COPD  ADEEL  PE, Acute, segmental, subsegmental  Pulmonary emphysema  Comment: Acute on chronic.  Admitted to [facility] on 3/22/25: She had a repeat CTA that demonstrated progression of her PE and RLL collapse concerning for mucous plugging and pneumonia. Primary team has been having issues w/ progressively increasing FiO2 needs. She had a sputum culture grow Candida & has been on fluconazole since 3/25. Transferred to [facility] d/t concerns that she might require a tracheostomy secondary to her inability to liberate from the vent. At [facility] patient was admitted to the ICU on a ventilator, successfully extubated 4/1 and downgraded to floor status 4/3. Infectious disease consulted and continued patient on Zosyn course to be completed through 4/17/2025. Patient was able to liberate from ventilator withOUT tracheostomy formation.   Plan:  -Monitor respiratory status  -Oxygen 3L via oxymask overnights.   -Encourage incentive spirometry every 4 hours while awake  -Continue apixaban 5mg BID  -Continue albuterol nebulization every 2 hour PRN  -Continue umeclidinium-vilanterol (anoro ellipta) 62.5-25mcg inhaler daily  -BMP and CBC due 5/21/25--results pending  -CMP and CBC due 5/28/25     Perineal Abscess  Condyloma Acuminata  Comment: Acute on chronic. Per recent hospitalization-General surgery consulted for right keiry/perineal abscess and performed incision and drainage with Penrose drain placement 3/31 with subsequent repeat I&D 4/2 and 4/8. s/p acyclovir and I&D x3. Noted on 4/28/25-5/4/25: Wound vac remains--penrose drain removed--packing to tract along/under R labia.   Plan:  -Monitor for worsening s/symptoms of concerns  -Air mattress while on site  -Follow up with wound clinic as directed. Scheduled for 6/10/25  -Continue PVR Q shift x 3 days. St cath if PVR>450. So far results have been <200  -Monitor pain complaints  -Continue Tylenol PRN  -BMP and CBC due 5/21/25--results pending  -CMP and CBC due 5/28/25  -Continue wound care as directed per vascular  *Change every other day and  PRN  Cleanse your right groin wound(s) with Dilute hibiclens 30cc in 500cc NS.  Pat Dry with non-sterile gauze  Apply small amout of clotrimazole cream to the periwound rash use 1/2 pea sized amount  Primary Dressing: Apply silvercel or aquacel ag into/onto the wounds  Secondary dressing: Cover with bordered foam; such as 4x8 mepilex border  It is ok to get your wound wet in the bath or shower    Seborrheic dermatitis:   Comment: Acute on chronic. Noted to face PTA  Plan:  -Monitor for worsening s/symptoms of concerns     T2DM  Poorly controlled as outpatient with A1c 12.7 in march 2025. Used to be on insulin pump. No longer on it due to intellectual delay.  Plan:  -Reduce Blood Glucose down to BID using Freestyle Bud 2 device. Change device every 14 days and PRN. Update provider if Blood Glucose<70 and.or >450  -Discontinue insulin aspart sliding scale orders  -Continue jardiance 25mg daily  -Monitor hgb A1c every 3 months. Due June 2025  -Discontinue insulin aspart 10 units with meals  -Continue insulin glargine 38 units at HS. HOLD if Blood Glucose<120  -BMP and CBC due 5/21/25--results pending  -CMP and CBC due 5/28/25         CKD stage 4  YADIRA  Comment: Chronic. Baseline creatinine~1.1-1.3  Plan:  -Monitor urinary status  -Monitor for worsening s/symptoms of concerns  -BMP and CBC due 5/21/25--results pending  -CMP and CBC due 5/28/25     Essential hypertension   Hyperlipidemia, unspecified hyperlipidemia type   Comment: Chronic. Based on JNC-8 goals,  patients age of 59 year old, presence of diabetes or CKD, and goals of care goal BP is  <140/90 mm Hg. Patient is stable with current plan of care and routine assessment..  Plan:  -Monitor BP and HR as directed  -Continue amlodipine 5mg daily. HOLD if SBP<100  -Continue atorvastatin 40mg daily  -Continue apixaban 5mg BID  -BMP and CBC due 5/21/25--results pending  -CMP and CBC due 5/28/25     Reactive thrombocytosis  Comment: Chronic. Platelets historically in  normal range (reviewed last 3 years). Peaked at 700,000, now resolved--Likely  reactive - reviewed flow chart for acute thrombocytosis, posted in note. Normal peripheral smear, liver enzymes  Plan;  -Monitor bleeding risks  -Monitor for worsening s/symptoms of concerns  -BMP and CBC due 5/21/25--results pending  -CMP and CBC due 5/28/25     Electronically signed by:  Dr. Jayhsree Montenegro DNP, APRN, FNP-C, WCS-C, EDS-C     Provider reviewed records from facility, and interpreted most recent imaging/lab work, and vital signs.   Acute and chronic conditions managed by writer. Have been reviewed during today's exam.         Sincerely,        Jayshree Montenegro, BLAIR CNP    Electronically signed

## 2025-05-25 ENCOUNTER — TELEPHONE (OUTPATIENT)
Dept: GERIATRICS | Facility: CLINIC | Age: 60
End: 2025-05-25
Payer: COMMERCIAL

## 2025-05-25 NOTE — TELEPHONE ENCOUNTER
Marly DONA Cannon is a 59 year old  (1965), Nurse called today to report: Jardiance will not be available until late evening, okay to hold today resume in the a.m.       Electronically signed by:   Tila Bo CNP

## 2025-05-27 LAB
ACID FAST STAIN (ARUP): NORMAL

## 2025-05-28 ENCOUNTER — TRANSITIONAL CARE UNIT VISIT (OUTPATIENT)
Dept: GERIATRICS | Facility: CLINIC | Age: 60
End: 2025-05-28
Payer: COMMERCIAL

## 2025-05-28 ENCOUNTER — LAB REQUISITION (OUTPATIENT)
Dept: LAB | Facility: CLINIC | Age: 60
End: 2025-05-28
Payer: COMMERCIAL

## 2025-05-28 VITALS
RESPIRATION RATE: 18 BRPM | BODY MASS INDEX: 30.52 KG/M2 | HEIGHT: 65 IN | HEART RATE: 87 BPM | WEIGHT: 183.2 LBS | OXYGEN SATURATION: 96 % | SYSTOLIC BLOOD PRESSURE: 141 MMHG | TEMPERATURE: 98.8 F | DIASTOLIC BLOOD PRESSURE: 81 MMHG

## 2025-05-28 DIAGNOSIS — I48.0 PAF (PAROXYSMAL ATRIAL FIBRILLATION) (H): Primary | ICD-10-CM

## 2025-05-28 DIAGNOSIS — I10 ESSENTIAL HYPERTENSION: ICD-10-CM

## 2025-05-28 DIAGNOSIS — E11.22 TYPE 2 DIABETES MELLITUS WITH STAGE 3 CHRONIC KIDNEY DISEASE, WITH LONG-TERM CURRENT USE OF INSULIN, UNSPECIFIED WHETHER STAGE 3A OR 3B CKD (H): ICD-10-CM

## 2025-05-28 DIAGNOSIS — N18.4 CHRONIC KIDNEY DISEASE, STAGE 4 (SEVERE) (H): ICD-10-CM

## 2025-05-28 DIAGNOSIS — Z79.4 TYPE 2 DIABETES MELLITUS WITH STAGE 3 CHRONIC KIDNEY DISEASE, WITH LONG-TERM CURRENT USE OF INSULIN, UNSPECIFIED WHETHER STAGE 3A OR 3B CKD (H): ICD-10-CM

## 2025-05-28 DIAGNOSIS — N18.30 TYPE 2 DIABETES MELLITUS WITH STAGE 3 CHRONIC KIDNEY DISEASE, WITH LONG-TERM CURRENT USE OF INSULIN, UNSPECIFIED WHETHER STAGE 3A OR 3B CKD (H): ICD-10-CM

## 2025-05-28 DIAGNOSIS — R53.81 PHYSICAL DECONDITIONING: ICD-10-CM

## 2025-05-28 DIAGNOSIS — J44.1 CHRONIC OBSTRUCTIVE PULMONARY DISEASE WITH ACUTE EXACERBATION (H): ICD-10-CM

## 2025-05-28 DIAGNOSIS — F81.9 INTELLECTUAL DELAY: ICD-10-CM

## 2025-05-28 DIAGNOSIS — I26.99 OTHER ACUTE PULMONARY EMBOLISM WITHOUT ACUTE COR PULMONALE (H): ICD-10-CM

## 2025-05-28 DIAGNOSIS — E78.5 HYPERLIPIDEMIA, UNSPECIFIED HYPERLIPIDEMIA TYPE: ICD-10-CM

## 2025-05-28 DIAGNOSIS — I10 ESSENTIAL (PRIMARY) HYPERTENSION: ICD-10-CM

## 2025-05-28 DIAGNOSIS — D64.9 ANEMIA, UNSPECIFIED: ICD-10-CM

## 2025-05-28 DIAGNOSIS — K59.01 SLOW TRANSIT CONSTIPATION: ICD-10-CM

## 2025-05-28 PROCEDURE — 99305 1ST NF CARE MODERATE MDM 35: CPT | Performed by: INTERNAL MEDICINE

## 2025-05-28 NOTE — LETTER
5/28/2025      Marly Cannon  [address]  [place] [address]        [facility]  INITIAL VISIT NOTE  May 28, 2025      PRIMARY CARE PROVIDER AND CLINIC:  Amberly Whyte [address] / [place] [address]    [facility] Medical Record Number:  [id number]  Place of Service where encounter took place:  [facility] (TCU)(SNF) [[id number]]    Chief Complaint   Patient presents with     Hospital F/U       HPI:    Marly Cannon is a 59 year old  (1965) female seen today at [facility]. Medical history is notable for DM, COPD, HTN, intellectual delay.   Recent medical course:  **[place] [facility] 3/18/25 to 3/20/25 -- hyperglycemia in setting of not taking insulin for several days and concern for ability to care for herself at home  **[place] 3/22/25 to 3/31/25 --presented with dyspnea and admitted with SVT/a-fib with RVR and LLL PE. Had worsening respiratory status and was intubated. She developed YADIRA, PNA, ongoing a-fib with RVR, volume overload and encephalopathy. She was transferred to [facility] for higher level of care  **[facility] 3/31/25 to 4/11/25 -- extubated on 4/1. Had I&D x3 of R groin/perineal abscess and ultimately wound VAC placed.   **[facility] 4/11/25 to 5/9/25 -- transferred for wound cares and overall complex medical care; insulin increased     She was admitted to this facility for  rehab, medical management, and nursing care.      History obtained from: facility chart records, facility staff, patient report, [facility] chart review, and Care Everywhere [facility] chart review.      Today, Ms. Cannon is seen in her room sitting in a wheelchair before lunch. She is a limited historian. Is able to tell me the Landrum and wound vac are both done and she is happy about this. No aches or pains. No trouble breathing. No acute concerns today per discussion with nursing. She is working with therapies. Goal is to get  back to [facility].     CODE STATUS: CPR/Full code     ALLERGIES:  No Known Allergies    PAST MEDICAL HISTORY:   Past Medical History:   Diagnosis Date     ACP (advance care planning) 09/09/2016    Advance Care Planning 9/9/2016: ACP Review of Chart / Resources Provided:  Reviewed chart for advance care plan.  Marly Cannon has been provided information and resources to begin or update their advance care plan.  Added by Naty Allen                Acute on chronic respiratory failure with hypoxia and hypercapnia (H) 03/22/2025     Acute respiratory failure with hypoxia (H) 03/31/2025     Breast fibrocystic disorder 02/22/2008    Overview:   IMO Update 10/11       Callus of foot 05/29/2019     COPD (chronic obstructive pulmonary disease) (H)      Diabetes (H)      Diabetic ketosis (H) 03/18/2025     Diabetic polyneuropathy associated with diabetes mellitus due to underlying condition (H) 01/17/2023     Essential hypertension 05/28/2019     [place] cardiac risk <10% in next 10 years 02/22/2019    Overview:   Started high intensity statin.        H/O colonoscopy 04/24/2019    Had in 10/2018, due for repeat 5 years       Hyperglycemia 03/22/2025     Hyperlipidemia, unspecified hyperlipidemia type 05/28/2019     Hyperparathyroidism due to renal insufficiency 06/14/2016     Hypertension      Intellectual disability 08/01/2017     Memory disturbance 02/13/2020     Microalbuminuria due to type 2 diabetes mellitus (H) 06/14/2016     ADEEL (obstructive sleep apnea) 02/16/2021    Interp for PSG dated 12/16/2020.     Weight 202 lbs.  Total sleep time 417.5 minutes, sleep efficiency 83%, sleep latency 15 minutes, REM latency - minutes.  Arousal index 45.6.  Sleep architecture was incredibly fragmented with no clear REM observed.     AHI 43.1, events appearing primarily obstructive in nature.  Mean Spo2 86%, isabel SpO2 78%, 96.2% of recording was <= 89%.       EKG appeared NS     PAF (paroxysmal atrial fibrillation) (H)  03/22/2025     Pneumonia 03/22/2025     Pulmonary emphysema (H) 08/21/2015    Confirmed via PFTs in 8/2015       Stage 3 chronic kidney disease (H) 02/12/2016    Overview:   Updated per 10/1/17 IMO import       Tobacco abuse 07/09/2015     Tremor 11/09/2009    Formatting of this note might be different from the original.  Shakes head       Tubular adenoma of colon 09/28/2018     Type 2 diabetes, HbA1c goal < 7% (H) 07/24/2015     Unable to care for self 03/18/2025     Urinary incontinence, unspecified type 01/17/2023     Vitamin D deficiency 06/14/2016       PAST SURGICAL HISTORY:   Past Surgical History:   Procedure Laterality Date     BIOPSY BREAST Left 02/14/2008    patient states no bx, Clip in left breast/ stereo bxc 2-- no path in EPIC that far back     COLONOSCOPY N/A 08/20/2015    Procedure: COLONOSCOPY;  Surgeon: Gentry Porter MD;  Location: [facility]     COLONOSCOPY N/A 09/21/2018    Procedure: COLONOSCOPY;  COLONOSCOPY WITH POLYPECTOMY;  Surgeon: Gentry Porter MD;  Location: [facility]     INCISION AND DRAINAGE PERINEAL, COMBINED N/A 4/2/2025    Procedure: INCISION AND DRAINAGE, PERINEUM;  Surgeon: Jeremy Lyons MD;  Location: [facility] [place] OR     IRRIGATION AND DEBRIDEMENT SACRAL WOUND, COMBINED Right 4/8/2025    Procedure: IRRIGATION AND DEBRIDEMENT, WOUND, RIGHT GROIN AND LABIA;  Surgeon: Casey Rossi MD;  Location: [facility] OR       FAMILY HISTORY:   Family History   Problem Relation Age of Onset     Diabetes Mother      Diabetes Father      Hypertension Brother      Diabetes Sister        SOCIAL HISTORY:   Patient's living condition: lives alone in [place], had a home nurse    MEDICATIONS:  Post Discharge Medication Reconciliation Status: discharge medications reconciled and changed, per note/orders.  Current Outpatient Medications   Medication Sig Dispense Refill     acetaminophen (TYLENOL) 325 MG tablet Take 2 tablets (650 mg) by mouth every 6 hours as needed  "for mild pain or fever.       albuterol (PROVENTIL) (2.5 MG/3ML) 0.083% neb solution Take 1 vial (2.5 mg) by nebulization every 2 hours as needed for shortness of breath.       amLODIPine (NORVASC) 5 MG tablet Take 1 tablet (5 mg) by mouth daily. HOLD if SBP<100       apixaban ANTICOAGULANT (ELIQUIS) 5 MG tablet Take 1 tablet (5 mg) by mouth 2 times daily.       atorvastatin (LIPITOR) 40 MG tablet TAKE 1 TABLET BY MOUTH AT BEDTIME 90 tablet 2     Cholecalciferol (VITAMIN D3) 50 MCG (2000 UT) CAPS TAKE 1 CAPSULE BY MOUTH DAILY 90 capsule 0     clotrimazole (LOTRIMIN) 1 % external cream Apply topically daily. 30 g 2     empagliflozin (JARDIANCE) 25 MG TABS tablet Take 1 tablet (25 mg) by mouth daily 90 tablet 3     insulin glargine (LANTUS PEN) 100 UNIT/ML pen Inject 38 Units subcutaneously at bedtime. HOLD if Blood Glucose<120       multivitamin w/minerals (THERA-VIT-M) tablet Take 1 tablet by mouth daily.       ondansetron (ZOFRAN ODT) 4 MG ODT tab Take 1 tablet (4 mg) by mouth every 6 hours as needed.       polyethylene glycol (MIRALAX) 17 g packet Take 17 g by mouth daily as needed for constipation.       sennosides (SENOKOT) 8.6 MG tablet Take 1 tablet by mouth 2 times daily as needed for constipation.       umeclidinium-vilanterol (ANORO ELLIPTA) 62.5-25 MCG/ACT oral inhaler Inhale 1 puff into the lungs daily.       Continuous Glucose Sensor (FREESTYLE HANK 2 SENSOR) MISC 1 each every 14 days. Use 1 sensor every 14 days. Use to read blood sugars per 's instructions. 6 each 5     insulin pen needle (32G X 4 MM) 32G X 4 MM miscellaneous Use 1 pen needles daily 100 each 3     OneTouch Delica Lancets 33G MISC Inject 1 each Subcutaneous 3 times daily (before meals) 100 each 11     order for DME Women briefs 50 each 1       ROS:  4 point ROS neg other than the symptoms noted above in the HPI.    PHYSICAL EXAM:  BP (!) 141/81   Pulse 87   Temp 98.8  F (37.1  C)   Resp 18   Ht 1.651 m (5' 5\")   Wt 83.1 " kg (183 lb 3.2 oz)   SpO2 96%   BMI 30.49 kg/m    Gen: sitting in wheelchair, alert, cooperative and in no acute distress  Resp: breathing non labored, no tachypnea   Ext: no LE edema  Neuro: CX II-XII grossly in tact; ROM in all four extremities grossly in tact  Psych: alert and oriented to self and general situation     LABORATORY/IMAGING DATA:  Reviewed as per [facility] and/or [facility]    ASSESSMENT/PLAN:    Right Groin/Perineal Wound s/p I&D x3 and Wound VAC  Had follow up in wound clinic on 5/20.   -- wound cares as ordered  -- follow up in wound clinic as scheduled on 6/10    LLL Pulmonary Embolism (March)  New on DOAC at that time  --Apixaban 5 mg BID    HTN, HLD  SBPs 110s-130s.   --Amlodipine 5 mg daily, atorvastatin 40 mg daily  -- follow BPs and adjust medications as needed    Paroxysmal Atrial Fibrillation  HR 60s-90s.   --Apixaban 5 mg BID (started for PE)    DM, Type II  Hgb A1c 12.7.  Sugars 110s-160s (am) and 150s-300 (hs)  --Glargine 38 units at bedtime  -- Empagliflozin 25 mg daily  -- Follow sugars and adjust insulin as needed    COPD  No signs of exacerbation.  -- Umeclidinium-vilanterol 62.5-25 mcg daily  -- Albuterol neb PRN    CKD, Stage III  Baseline Cr low 1s. Cr 1.56 on 5/21.   -- periodic BMP    Hx Intellectual Disability  BIMS 14/15. Limited historian more than BIMS would suggest  -- OT and SW following    Slow Transit Constipation  -- Miralax 17g daily PRN and Senna 1 tab BID PRN  -- adjust bowel regimen as needed    Physical Deconditioning  In setting of hospitalization and underlying medical conditions  -- ongoing PT/OT    Electronically signed by:  Sophia Rothman MD                          Sincerely,        Sophia Rothman MD    Electronically signed

## 2025-05-28 NOTE — PROGRESS NOTES
[facility]  INITIAL VISIT NOTE  May 28, 2025      PRIMARY CARE PROVIDER AND CLINIC:  Amberly Whyte [address] / [place] [address]    [facility] Medical Record Number:  [id number]  Place of Service where encounter took place:  [facility])([facility]) [[id number]]    Chief Complaint   Patient presents with    Hospital F/U       HPI:    Marly Cannon is a 59 year old  (1965) female seen today at [facility]. Medical history is notable for DM, COPD, HTN, intellectual delay.   Recent medical course:  **[place] [facility] 3/18/25 to 3/20/25 -- hyperglycemia in setting of not taking insulin for several days and concern for ability to care for herself at home  **[place] [facility] 3/22/25 to 3/31/25 --presented with dyspnea and admitted with SVT/a-fib with RVR and LLL PE. Had worsening respiratory status and was intubated. She developed YADIRA, PNA, ongoing a-fib with RVR, volume overload and encephalopathy. She was transferred to [facility] for higher level of care  **[facility] 3/31/25 to 4/11/25 -- extubated on 4/1. Had I&D x3 of R groin/perineal abscess and ultimately wound VAC placed.   **[facility] 4/11/25 to 5/9/25 -- transferred for wound cares and overall complex medical care; insulin increased     She was admitted to this facility for  rehab, medical management, and nursing care.      History obtained from: facility chart records, facility staff, patient report, [facility] chart review, and Care Everywhere [facility] chart review.      Today, Ms. Cannon is seen in her room sitting in a wheelchair before lunch. She is a limited historian. Is able to tell me the Landrum and wound vac are both done and she is happy about this. No aches or pains. No trouble breathing. No acute concerns today per discussion with nursing. She is working with therapies. Goal is to get back to [facility].     CODE STATUS: CPR/Full code     ALLERGIES:  No Known  Allergies    PAST MEDICAL HISTORY:   Past Medical History:   Diagnosis Date    ACP (advance care planning) 09/09/2016    Advance Care Planning 9/9/2016: ACP Review of Chart / Resources Provided:  Reviewed chart for advance care plan.  Marly Cannon has been provided information and resources to begin or update their advance care plan.  Added by Naty Allen               Acute on chronic respiratory failure with hypoxia and hypercapnia (H) 03/22/2025    Acute respiratory failure with hypoxia (H) 03/31/2025    Breast fibrocystic disorder 02/22/2008    Overview:   IMO Update 10/11      Callus of foot 05/29/2019    COPD (chronic obstructive pulmonary disease) (H)     Diabetes (H)     Diabetic ketosis (H) 03/18/2025    Diabetic polyneuropathy associated with diabetes mellitus due to underlying condition (H) 01/17/2023    Essential hypertension 05/28/2019    [place] cardiac risk <10% in next 10 years 02/22/2019    Overview:   Started high intensity statin.       H/O colonoscopy 04/24/2019    Had in 10/2018, due for repeat 5 years      Hyperglycemia 03/22/2025    Hyperlipidemia, unspecified hyperlipidemia type 05/28/2019    Hyperparathyroidism due to renal insufficiency 06/14/2016    Hypertension     Intellectual disability 08/01/2017    Memory disturbance 02/13/2020    Microalbuminuria due to type 2 diabetes mellitus (H) 06/14/2016    ADEEL (obstructive sleep apnea) 02/16/2021    Interp for PSG dated 12/16/2020.     Weight 202 lbs.  Total sleep time 417.5 minutes, sleep efficiency 83%, sleep latency 15 minutes, REM latency - minutes.  Arousal index 45.6.  Sleep architecture was incredibly fragmented with no clear REM observed.     AHI 43.1, events appearing primarily obstructive in nature.  Mean Spo2 86%, isabel SpO2 78%, 96.2% of recording was <= 89%.       EKG appeared NS    PAF (paroxysmal atrial fibrillation) (H) 03/22/2025    Pneumonia 03/22/2025    Pulmonary emphysema (H) 08/21/2015    Confirmed via PFTs in  8/2015      Stage 3 chronic kidney disease (H) 02/12/2016    Overview:   Updated per 10/1/17 IMO import      Tobacco abuse 07/09/2015    Tremor 11/09/2009    Formatting of this note might be different from the original.  Shakes head      Tubular adenoma of colon 09/28/2018    Type 2 diabetes, HbA1c goal < 7% (H) 07/24/2015    Unable to care for self 03/18/2025    Urinary incontinence, unspecified type 01/17/2023    Vitamin D deficiency 06/14/2016       PAST SURGICAL HISTORY:   Past Surgical History:   Procedure Laterality Date    BIOPSY BREAST Left 02/14/2008    patient states no bx, Clip in left breast/ stereo bxc 2-- no path in EPIC that far back    COLONOSCOPY N/A 08/20/2015    Procedure: COLONOSCOPY;  Surgeon: Gentry Porter MD;  Location: [facility]    COLONOSCOPY N/A 09/21/2018    Procedure: COLONOSCOPY;  COLONOSCOPY WITH POLYPECTOMY;  Surgeon: Gentry Porter MD;  Location: [facility]    INCISION AND DRAINAGE PERINEAL, COMBINED N/A 4/2/2025    Procedure: INCISION AND DRAINAGE, PERINEUM;  Surgeon: Jeremy Lyons MD;  Location: [facility]    IRRIGATION AND DEBRIDEMENT SACRAL WOUND, COMBINED Right 4/8/2025    Procedure: IRRIGATION AND DEBRIDEMENT, WOUND, RIGHT GROIN AND LABIA;  Surgeon: Casey Rossi MD;  Location: [facility] OR       FAMILY HISTORY:   Family History   Problem Relation Age of Onset    Diabetes Mother     Diabetes Father     Hypertension Brother     Diabetes Sister        SOCIAL HISTORY:   Patient's living condition: lives alone in [place], had a home nurse    MEDICATIONS:  Post Discharge Medication Reconciliation Status: discharge medications reconciled and changed, per note/orders.  Current Outpatient Medications   Medication Sig Dispense Refill    acetaminophen (TYLENOL) 325 MG tablet Take 2 tablets (650 mg) by mouth every 6 hours as needed for mild pain or fever.      albuterol (PROVENTIL) (2.5 MG/3ML) 0.083% neb solution Take 1 vial (2.5 mg) by  "nebulization every 2 hours as needed for shortness of breath.      amLODIPine (NORVASC) 5 MG tablet Take 1 tablet (5 mg) by mouth daily. HOLD if SBP<100      apixaban ANTICOAGULANT (ELIQUIS) 5 MG tablet Take 1 tablet (5 mg) by mouth 2 times daily.      atorvastatin (LIPITOR) 40 MG tablet TAKE 1 TABLET BY MOUTH AT BEDTIME 90 tablet 2    Cholecalciferol (VITAMIN D3) 50 MCG (2000 UT) CAPS TAKE 1 CAPSULE BY MOUTH DAILY 90 capsule 0    clotrimazole (LOTRIMIN) 1 % external cream Apply topically daily. 30 g 2    empagliflozin (JARDIANCE) 25 MG TABS tablet Take 1 tablet (25 mg) by mouth daily 90 tablet 3    insulin glargine (LANTUS PEN) 100 UNIT/ML pen Inject 38 Units subcutaneously at bedtime. HOLD if Blood Glucose<120      multivitamin w/minerals (THERA-VIT-M) tablet Take 1 tablet by mouth daily.      ondansetron (ZOFRAN ODT) 4 MG ODT tab Take 1 tablet (4 mg) by mouth every 6 hours as needed.      polyethylene glycol (MIRALAX) 17 g packet Take 17 g by mouth daily as needed for constipation.      sennosides (SENOKOT) 8.6 MG tablet Take 1 tablet by mouth 2 times daily as needed for constipation.      umeclidinium-vilanterol (ANORO ELLIPTA) 62.5-25 MCG/ACT oral inhaler Inhale 1 puff into the lungs daily.      Continuous Glucose Sensor (FREESTYLE HANK 2 SENSOR) MISC 1 each every 14 days. Use 1 sensor every 14 days. Use to read blood sugars per 's instructions. 6 each 5    insulin pen needle (32G X 4 MM) 32G X 4 MM miscellaneous Use 1 pen needles daily 100 each 3    OneTouch Delica Lancets 33G MISC Inject 1 each Subcutaneous 3 times daily (before meals) 100 each 11    order for DME Women briefs 50 each 1       ROS:  4 point ROS neg other than the symptoms noted above in the HPI.    PHYSICAL EXAM:  BP (!) 141/81   Pulse 87   Temp 98.8  F (37.1  C)   Resp 18   Ht 1.651 m (5' 5\")   Wt 83.1 kg (183 lb 3.2 oz)   SpO2 96%   BMI 30.49 kg/m    Gen: sitting in wheelchair, alert, cooperative and in no acute " distress  Resp: breathing non labored, no tachypnea   Ext: no LE edema  Neuro: CX II-XII grossly in tact; ROM in all four extremities grossly in tact  Psych: alert and oriented to self and general situation     LABORATORY/IMAGING DATA:  Reviewed as per [facility] and/or [facility]    ASSESSMENT/PLAN:    Right Groin/Perineal Wound s/p I&D x3 and Wound VAC  Had follow up in wound clinic on 5/20.   -- wound cares as ordered  -- follow up in wound clinic as scheduled on 6/10    LLL Pulmonary Embolism (March)  New on DOAC at that time  --Apixaban 5 mg BID    HTN, HLD  SBPs 110s-130s.   --Amlodipine 5 mg daily, atorvastatin 40 mg daily  -- follow BPs and adjust medications as needed    Paroxysmal Atrial Fibrillation  HR 60s-90s.   --Apixaban 5 mg BID (started for PE)    DM, Type II  Hgb A1c 12.7.  Sugars 110s-160s (am) and 150s-300 (hs)  --Glargine 38 units at bedtime  -- Empagliflozin 25 mg daily  -- Follow sugars and adjust insulin as needed    COPD  No signs of exacerbation.  -- Umeclidinium-vilanterol 62.5-25 mcg daily  -- Albuterol neb PRN    CKD, Stage III  Baseline Cr low 1s. Cr 1.56 on 5/21.   -- periodic BMP    Hx Intellectual Disability  BIMS 14/15. Limited historian more than BIMS would suggest  -- OT and SW following    Slow Transit Constipation  -- Miralax 17g daily PRN and Senna 1 tab BID PRN  -- adjust bowel regimen as needed    Physical Deconditioning  In setting of hospitalization and underlying medical conditions  -- ongoing PT/OT    Electronically signed by:  Sophia Rothman MD

## 2025-05-28 NOTE — PROGRESS NOTES
[facility] GERIATRICS    Chief Complaint   Patient presents with    RECHECK     HPI:  Marly Cannon is a 59 year old  (1965), who is being seen today for an episodic care visit at: [facility]) [[id number]]. Today's concern is: The primary encounter diagnosis was Physical deconditioning. Diagnoses of Acute on chronic respiratory failure with hypoxia and hypercapnia (H), Generalized muscle weakness, History of pneumonia, Chronic obstructive pulmonary disease with acute exacerbation (H), ADEEL (obstructive sleep apnea), Perineal abscess, Condyloma acuminata, Acute pulmonary embolism, unspecified pulmonary embolism type, unspecified whether acute cor pulmonale present (H), Pulmonary emphysema, unspecified emphysema type (H), Type 2 diabetes, HbA1c goal < 7% (H), Diabetic polyneuropathy associated with diabetes mellitus due to underlying condition (H), YADIRA (acute kidney injury), Chronic kidney disease, stage 4 (severe) (H), PAF (paroxysmal atrial fibrillation) (H), Essential hypertension, Hyperlipidemia, unspecified hyperlipidemia type, Intellectual delay, Unable to care for self, Seborrheic dermatitis, Reactive thrombocytosis, and Open wound were also pertinent to this visit.    Met with patient who was found sitting upright in wheelchair. She denies any chest pain, palpitations, shortness of breath, RUBY, lightheadedness, dizziness, or cough. Denies any abdominal discomfort. Denies N&V. Denies B&B concerns. Denies dysuria or frequency. Denies loose or constipation. Appetite good. Sleeping well. Mood stable. She reports being very happy with the progress she has made with therapies. No pain complaints.     BP Readings from Last 3 Encounters:   05/29/25 131/62   05/28/25 (!) 141/81   05/21/25 104/59     Wt Readings from Last 5 Encounters:   05/29/25 83 kg (183 lb)   05/28/25 83.1 kg (183 lb 3.2 oz)   05/21/25 83 kg (183 lb)   05/19/25 83.6 kg (184 lb 6.4 oz)   05/15/25 83.6  "kg (184 lb 6.4 oz)     Allergies, and PMH/PSH reviewed in [facility] today.  REVIEW OF SYSTEMS:  Limited secondary to cognitive impairment but today pt reports as noted per HPI    Objective:   /62   Pulse 72   Temp 98.4  F (36.9  C)   Resp 18   Ht 1.651 m (5' 5\")   Wt 83 kg (183 lb)   SpO2 98%   BMI 30.45 kg/m    GENERAL APPEARANCE:  Alert, in no distress, cooperative  ENT:  Mouth and posterior oropharynx normal, moist mucous membranes, [place]  EYES:  EOM, conjunctivae, lids, pupils and irises normal  NECK:  No adenopathy,masses or thyromegaly  RESP:  respiratory effort and palpation of chest normal, lungs clear to auscultation , no respiratory distress  CV:  regular rate and rhythm, no murmur, rub, or gallop, peripheral edema 1+ in BLE  ABDOMEN:  normal bowel sounds, soft, nontender, no hepatosplenomegaly or other masses, no guarding or rebound  M/S:   Ambulates with walker. Wheelchair for long distance needs. Staff to assist for ADLs, transfers and cares  SKIN:  Inspection of skin and subcutaneous tissue baseline, wound healing well, no signs of infection see photo below of right groin  NEURO:   Cranial nerves 2-12 are normal tested and grossly at patient's baseline, no purposeful movement in upper and lower extremities  PSYCH:  oriented X 3, memory impaired , affect and mood normal      Most Recent 3 CBC's:  Recent Labs   Lab Test 05/21/25  1152 05/15/25  0850 05/09/25  0619   WBC 9.3 10.6 8.8   HGB 12.3 12.5 11.7   MCV 91 94 93    425 380     Most Recent 3 BMP's:  Recent Labs   Lab Test 05/21/25  1152 05/15/25  0850 05/09/25  1208 05/09/25  0811 05/09/25  0619   * 137  --   --  140   POTASSIUM 4.4 4.4  --   --  4.5   CHLORIDE 97* 98  --   --  102   CO2 23 25  --   --  31*   BUN 41.4* 48.6*  --   --  47.4*   CR 1.56* 1.25*  --   --  1.18*   ANIONGAP 14 14  --   --  7   NORM 9.7 10.0  --   --  9.7   * 164* 208*   < > 132*    < > = values in this interval not displayed.     Most Recent 2 " LFT's:  Recent Labs   Lab Test 05/15/25  0850 05/05/25  0654   AST 24 15   ALT 11 13   ALKPHOS 96 87   BILITOTAL 0.6 0.4     Most Recent Hemoglobin A1c:  Recent Labs   Lab Test 03/22/25  1731   A1C 12.7*     Most Recent Anemia Panel:  Recent Labs   Lab Test 05/21/25  1152 05/15/25  0850 04/23/25  0623 04/21/25  0814 04/07/25  0542 04/06/25  0431 04/05/25  0451 04/19/23  1510 01/17/23  1505   WBC 9.3 10.6   < > 9.7   < > 7.3 7.7   < > 10.1   HGB 12.3 12.5   < > 11.2*   < > 10.1* 9.9*   < > 17.1*   HCT 38.1 39.2   < > 34.8*   < > 31.2* 32.5*   < > 50.1*   MCV 91 94   < > 96   < > 94 96   < > 88    425   < > 650*   < > 282 292   < > 263   IRON  --   --   --   --   --   --  51  --   --    IRONSAT  --   --   --   --   --   --  27  --   --    RETICABSCT  --   --   --  0.116*  --   --   --   --   --    RETP  --   --   --  3.2*  --   --   --   --   --    FEB  --   --   --   --   --   --  188*  --   --    OLIVA  --   --   --   --   --   --  407*  --   --    B12  --  559  --   --   --   --  859   < >  --    FOLIC  --   --   --   --   --  4.9  --   --   --    EPOE  --   --   --   --   --   --   --   --  7    < > = values in this interval not displayed.       ASSESSMENT/PLAN:  Physical deconditioning  Generalized muscle weakness  Unable to care for self  Intellectual delay  Comment: Chronic. S/T prolonged hospitalization. Per therapy- Patient requires set up for UB needs, ModA for LB needs. Ambulates up to 150ft 2WW CGA.   Plan:  -Continue Physical therapy and Occupational therapy as directed  -SW to remain involved for safe discharge planning needs  -Recommend LTC placement post TCU     Acute on Chronic Hypoxic Respiratory Failure  History of VAP  COPD  ADEEL  PE, Acute, segmental, subsegmental  Pulmonary emphysema  Comment: Acute on chronic. Admitted to [facility] on 3/22/25: She had a repeat CTA that demonstrated progression of her PE and RLL collapse concerning for mucous plugging and pneumonia. Primary team has  been having issues w/ progressively increasing FiO2 needs. She had a sputum culture grow Candida & has been on fluconazole since 3/25. Transferred to [facility] d/t concerns that she might require a tracheostomy secondary to her inability to liberate from the vent. At [facility] patient was admitted to the ICU on a ventilator, successfully extubated 4/1 and downgraded to floor status 4/3. Infectious disease consulted and continued patient on Zosyn course to be completed through 4/17/2025. Patient was able to liberate from ventilator withOUT tracheostomy formation.   Plan:  -Monitor respiratory status  -Oxygen 3L via oxymask overnights.   -Encourage incentive spirometry every 4 hours while awake  -Continue apixaban 5mg BID  -Continue albuterol nebulization every 2 hour PRN  -Continue umeclidinium-vilanterol (anoro ellipta) 62.5-25mcg inhaler daily  -CMP and CBC due 5/28/25, but was missed therefore was rescheduled for today 5/29/25--results pending  -Trend labs periodically while on TCU     Perineal Abscess  Condyloma Acuminata  Comment: Acute on chronic. Per recent hospitalization-General surgery consulted for right keiry/perineal abscess and performed incision and drainage with Penrose drain placement 3/31 with subsequent repeat I&D 4/2 and 4/8. s/p acyclovir and I&D x3. Noted on 4/28/25-5/4/25: Wound vac remains--penrose drain removed--packing to tract along/under R labia. Landrum catheter successfully removed.   Plan:  -Monitor for worsening s/symptoms of concerns  -Air mattress while on site  -Follow up with wound clinic as directed. Scheduled for 6/10/25  -Monitor pain complaints  -Continue Tylenol PRN  -CMP and CBC due 5/28/25, but was missed therefore was rescheduled for today 5/29/25--results pending  -Trend labs periodically while on TCU  -Continue wound care as directed per vascular  *Change every other day and PRN  Cleanse your right groin wound(s) with Dilute hibiclens 30cc in 500cc  NS.  Pat Dry with non-sterile gauze  Apply small amout of clotrimazole cream to the periwound rash use 1/2 pea sized amount  Primary Dressing: Apply silvercel or aquacel ag into/onto the wounds  Secondary dressing: Cover with bordered foam; such as 4x8 mepilex border  It is ok to get your wound wet in the bath or shower     Seborrheic dermatitis:   Comment: Acute on chronic. Noted to face PTA  Plan:  -Monitor for worsening s/symptoms of concerns     T2DM  Poorly controlled as outpatient with A1c 12.7 in march 2025. Used to be on insulin pump. No longer on it due to intellectual delay.  Plan:  -Reduce Blood Glucose down to BID using Freestyle Bud 2 device. Change device every 14 days and PRN. Update provider if Blood Glucose<70 and.or >450  -Continue jardiance 25mg daily  -Monitor hgb A1c every 3 months. Due June 2025  -Continue insulin glargine 38 units at HS. HOLD if Blood Glucose<120  -CMP and CBC due 5/28/25, but was missed therefore was rescheduled for today 5/29/25--results pending  -Trend labs periodically while on TCU          CKD stage 4  YADIRA  Comment: Chronic. Baseline creatinine~1.1-1.3  Plan:  -Monitor urinary status  -Monitor for worsening s/symptoms of concerns  -CMP and CBC due 5/28/25, but was missed therefore was rescheduled for today 5/29/25--results pending  -Trend labs periodically while on TCU     Essential hypertension   Hyperlipidemia, unspecified hyperlipidemia type   Comment: Chronic. Based on JNC-8 goals,  patients age of 59 year old, presence of diabetes or CKD, and goals of care goal BP is  <140/90 mm Hg. Patient is stable with current plan of care and routine assessment..  Plan:  -Monitor BP and HR as directed  -Continue amlodipine 5mg daily. HOLD if SBP<100  -Continue atorvastatin 40mg daily  -Continue apixaban 5mg BID  -CMP and CBC due 5/28/25, but was missed therefore was rescheduled for today 5/29/25--results pending  -Trend labs periodically while on TCU         Reactive  thrombocytosis  Comment: Chronic. Platelets historically in normal range (reviewed last 3 years). Peaked at 700,000, now resolved--Likely  reactive - reviewed flow chart for acute thrombocytosis, posted in note. Normal peripheral smear, liver enzymes  Plan;  -Monitor bleeding risks  -Monitor for worsening s/symptoms of concerns  -CMP and CBC due 5/28/25, but was missed therefore was rescheduled for today 5/29/25--results pending  -Trend labs periodically while on TCU     Electronically signed by:  Dr. Jayshree Montenegro DNP, APRN, FNP-C, WCS-C, EDS-C     Provider reviewed records from facility, and interpreted most recent imaging/lab work, and vital signs.   Acute and chronic conditions managed by writer. Have been reviewed during today's exam

## 2025-05-29 ENCOUNTER — TRANSITIONAL CARE UNIT VISIT (OUTPATIENT)
Dept: GERIATRICS | Facility: CLINIC | Age: 60
End: 2025-05-29
Payer: COMMERCIAL

## 2025-05-29 VITALS
TEMPERATURE: 98.4 F | HEIGHT: 65 IN | DIASTOLIC BLOOD PRESSURE: 62 MMHG | BODY MASS INDEX: 30.49 KG/M2 | OXYGEN SATURATION: 98 % | RESPIRATION RATE: 18 BRPM | SYSTOLIC BLOOD PRESSURE: 131 MMHG | HEART RATE: 72 BPM | WEIGHT: 183 LBS

## 2025-05-29 DIAGNOSIS — T14.8XXA OPEN WOUND: ICD-10-CM

## 2025-05-29 DIAGNOSIS — E11.9 TYPE 2 DIABETES, HBA1C GOAL < 7% (H): ICD-10-CM

## 2025-05-29 DIAGNOSIS — L21.9 SEBORRHEIC DERMATITIS: ICD-10-CM

## 2025-05-29 DIAGNOSIS — E08.42 DIABETIC POLYNEUROPATHY ASSOCIATED WITH DIABETES MELLITUS DUE TO UNDERLYING CONDITION (H): ICD-10-CM

## 2025-05-29 DIAGNOSIS — Z78.9 UNABLE TO CARE FOR SELF: ICD-10-CM

## 2025-05-29 DIAGNOSIS — Z87.01 HISTORY OF PNEUMONIA: ICD-10-CM

## 2025-05-29 DIAGNOSIS — L02.215 PERINEAL ABSCESS: ICD-10-CM

## 2025-05-29 DIAGNOSIS — N18.4 CHRONIC KIDNEY DISEASE, STAGE 4 (SEVERE) (H): ICD-10-CM

## 2025-05-29 DIAGNOSIS — I26.99 ACUTE PULMONARY EMBOLISM, UNSPECIFIED PULMONARY EMBOLISM TYPE, UNSPECIFIED WHETHER ACUTE COR PULMONALE PRESENT (H): ICD-10-CM

## 2025-05-29 DIAGNOSIS — E78.5 HYPERLIPIDEMIA, UNSPECIFIED HYPERLIPIDEMIA TYPE: ICD-10-CM

## 2025-05-29 DIAGNOSIS — J44.1 CHRONIC OBSTRUCTIVE PULMONARY DISEASE WITH ACUTE EXACERBATION (H): ICD-10-CM

## 2025-05-29 DIAGNOSIS — F81.9 INTELLECTUAL DELAY: ICD-10-CM

## 2025-05-29 DIAGNOSIS — J43.9 PULMONARY EMPHYSEMA, UNSPECIFIED EMPHYSEMA TYPE (H): ICD-10-CM

## 2025-05-29 DIAGNOSIS — R53.81 PHYSICAL DECONDITIONING: Primary | ICD-10-CM

## 2025-05-29 DIAGNOSIS — I48.0 PAF (PAROXYSMAL ATRIAL FIBRILLATION) (H): ICD-10-CM

## 2025-05-29 DIAGNOSIS — J96.22 ACUTE ON CHRONIC RESPIRATORY FAILURE WITH HYPOXIA AND HYPERCAPNIA (H): ICD-10-CM

## 2025-05-29 DIAGNOSIS — G47.33 OSA (OBSTRUCTIVE SLEEP APNEA): ICD-10-CM

## 2025-05-29 DIAGNOSIS — N17.9 AKI (ACUTE KIDNEY INJURY): ICD-10-CM

## 2025-05-29 DIAGNOSIS — I10 ESSENTIAL HYPERTENSION: ICD-10-CM

## 2025-05-29 DIAGNOSIS — A63.0 CONDYLOMA ACUMINATA: ICD-10-CM

## 2025-05-29 DIAGNOSIS — D75.838 REACTIVE THROMBOCYTOSIS: ICD-10-CM

## 2025-05-29 DIAGNOSIS — J96.21 ACUTE ON CHRONIC RESPIRATORY FAILURE WITH HYPOXIA AND HYPERCAPNIA (H): ICD-10-CM

## 2025-05-29 DIAGNOSIS — M62.81 GENERALIZED MUSCLE WEAKNESS: ICD-10-CM

## 2025-05-29 LAB
ALBUMIN SERPL BCG-MCNC: 3.9 G/DL (ref 3.5–5.2)
ALP SERPL-CCNC: 88 U/L (ref 40–150)
ALT SERPL W P-5'-P-CCNC: 15 U/L (ref 0–50)
ANION GAP SERPL CALCULATED.3IONS-SCNC: 11 MMOL/L (ref 7–15)
AST SERPL W P-5'-P-CCNC: 21 U/L (ref 0–45)
BASOPHILS # BLD AUTO: 0.1 10E3/UL (ref 0–0.2)
BASOPHILS NFR BLD AUTO: 1 %
BILIRUB SERPL-MCNC: 0.6 MG/DL
BUN SERPL-MCNC: 34.8 MG/DL (ref 8–23)
CALCIUM SERPL-MCNC: 10.2 MG/DL (ref 8.8–10.4)
CHLORIDE SERPL-SCNC: 101 MMOL/L (ref 98–107)
CREAT SERPL-MCNC: 1.46 MG/DL (ref 0.51–0.95)
EGFRCR SERPLBLD CKD-EPI 2021: 41 ML/MIN/1.73M2
EOSINOPHIL # BLD AUTO: 0.3 10E3/UL (ref 0–0.7)
EOSINOPHIL NFR BLD AUTO: 3 %
ERYTHROCYTE [DISTWIDTH] IN BLOOD BY AUTOMATED COUNT: 13.1 % (ref 10–15)
GLUCOSE SERPL-MCNC: 182 MG/DL (ref 70–99)
HCO3 SERPL-SCNC: 26 MMOL/L (ref 22–29)
HCT VFR BLD AUTO: 41.7 % (ref 35–47)
HGB BLD-MCNC: 13.2 G/DL (ref 11.7–15.7)
IMM GRANULOCYTES # BLD: 0 10E3/UL
IMM GRANULOCYTES NFR BLD: 0 %
LYMPHOCYTES # BLD AUTO: 3.5 10E3/UL (ref 0.8–5.3)
LYMPHOCYTES NFR BLD AUTO: 37 %
MCH RBC QN AUTO: 29.5 PG (ref 26.5–33)
MCHC RBC AUTO-ENTMCNC: 31.7 G/DL (ref 31.5–36.5)
MCV RBC AUTO: 93 FL (ref 78–100)
MONOCYTES # BLD AUTO: 0.8 10E3/UL (ref 0–1.3)
MONOCYTES NFR BLD AUTO: 9 %
NEUTROPHILS # BLD AUTO: 4.7 10E3/UL (ref 1.6–8.3)
NEUTROPHILS NFR BLD AUTO: 50 %
NRBC # BLD AUTO: 0 10E3/UL
NRBC BLD AUTO-RTO: 0 /100
PLATELET # BLD AUTO: 356 10E3/UL (ref 150–450)
POTASSIUM SERPL-SCNC: 4.6 MMOL/L (ref 3.4–5.3)
PROT SERPL-MCNC: 7.5 G/DL (ref 6.4–8.3)
RBC # BLD AUTO: 4.47 10E6/UL (ref 3.8–5.2)
SODIUM SERPL-SCNC: 138 MMOL/L (ref 135–145)
WBC # BLD AUTO: 9.4 10E3/UL (ref 4–11)

## 2025-05-29 PROCEDURE — 80053 COMPREHEN METABOLIC PANEL: CPT | Mod: ORL | Performed by: NURSE PRACTITIONER

## 2025-05-29 PROCEDURE — 36415 COLL VENOUS BLD VENIPUNCTURE: CPT | Mod: ORL | Performed by: NURSE PRACTITIONER

## 2025-05-29 PROCEDURE — 85025 COMPLETE CBC W/AUTO DIFF WBC: CPT | Mod: ORL | Performed by: NURSE PRACTITIONER

## 2025-05-29 NOTE — LETTER
5/29/2025      Marly Cannon  [address]  [place] [address]        [facility] GERIATRICS    Chief Complaint   Patient presents with     RECHECK     HPI:  Marly Cannon is a 59 year old  (1965), who is being seen today for an episodic care visit at: [facility] ([facility])([facility]) [[id number]]. Today's concern is: The primary encounter diagnosis was Physical deconditioning. Diagnoses of Acute on chronic respiratory failure with hypoxia and hypercapnia (H), Generalized muscle weakness, History of pneumonia, Chronic obstructive pulmonary disease with acute exacerbation (H), ADEEL (obstructive sleep apnea), Perineal abscess, Condyloma acuminata, Acute pulmonary embolism, unspecified pulmonary embolism type, unspecified whether acute cor pulmonale present (H), Pulmonary emphysema, unspecified emphysema type (H), Type 2 diabetes, HbA1c goal < 7% (H), Diabetic polyneuropathy associated with diabetes mellitus due to underlying condition (H), YADIRA (acute kidney injury), Chronic kidney disease, stage 4 (severe) (H), PAF (paroxysmal atrial fibrillation) (H), Essential hypertension, Hyperlipidemia, unspecified hyperlipidemia type, Intellectual delay, Unable to care for self, Seborrheic dermatitis, Reactive thrombocytosis, and Open wound were also pertinent to this visit.    Met with patient who was found sitting upright in wheelchair. She denies any chest pain, palpitations, shortness of breath, RUBY, lightheadedness, dizziness, or cough. Denies any abdominal discomfort. Denies N&V. Denies B&B concerns. Denies dysuria or frequency. Denies loose or constipation. Appetite good. Sleeping well. Mood stable. She reports being very happy with the progress she has made with therapies. No pain complaints.     BP Readings from Last 3 Encounters:   05/29/25 131/62   05/28/25 (!) 141/81   05/21/25 104/59     Wt Readings from Last 5 Encounters:   05/29/25 83 kg (183 lb)   05/28/25 83.1 kg (183 lb 3.2  "oz)   05/21/25 83 kg (183 lb)   05/19/25 83.6 kg (184 lb 6.4 oz)   05/15/25 83.6 kg (184 lb 6.4 oz)     Allergies, and PMH/PSH reviewed in EPIC today.  REVIEW OF SYSTEMS:  Limited secondary to cognitive impairment but today pt reports as noted per HPI    Objective:   /62   Pulse 72   Temp 98.4  F (36.9  C)   Resp 18   Ht 1.651 m (5' 5\")   Wt 83 kg (183 lb)   SpO2 98%   BMI 30.45 kg/m    GENERAL APPEARANCE:  Alert, in no distress, cooperative  ENT:  Mouth and posterior oropharynx normal, moist mucous membranes, [place]  EYES:  EOM, conjunctivae, lids, pupils and irises normal  NECK:  No adenopathy,masses or thyromegaly  RESP:  respiratory effort and palpation of chest normal, lungs clear to auscultation , no respiratory distress  CV:  regular rate and rhythm, no murmur, rub, or gallop, peripheral edema 1+ in BLE  ABDOMEN:  normal bowel sounds, soft, nontender, no hepatosplenomegaly or other masses, no guarding or rebound  M/S:   Ambulates with walker. Wheelchair for long distance needs. Staff to assist for ADLs, transfers and cares  SKIN:  Inspection of skin and subcutaneous tissue baseline, wound healing well, no signs of infection see photo below of right groin  NEURO:   Cranial nerves 2-12 are normal tested and grossly at patient's baseline, no purposeful movement in upper and lower extremities  PSYCH:  oriented X 3, memory impaired , affect and mood normal      Most Recent 3 CBC's:  Recent Labs   Lab Test 05/21/25  1152 05/15/25  0850 05/09/25  0619   WBC 9.3 10.6 8.8   HGB 12.3 12.5 11.7   MCV 91 94 93    425 380     Most Recent 3 BMP's:  Recent Labs   Lab Test 05/21/25  1152 05/15/25  0850 05/09/25  1208 05/09/25  0811 05/09/25  0619   * 137  --   --  140   POTASSIUM 4.4 4.4  --   --  4.5   CHLORIDE 97* 98  --   --  102   CO2 23 25  --   --  31*   BUN 41.4* 48.6*  --   --  47.4*   CR 1.56* 1.25*  --   --  1.18*   ANIONGAP 14 14  --   --  7   NORM 9.7 10.0  --   --  9.7   * 164* 208*  "  < > 132*    < > = values in this interval not displayed.     Most Recent 2 LFT's:  Recent Labs   Lab Test 05/15/25  0850 05/05/25  0654   AST 24 15   ALT 11 13   ALKPHOS 96 87   BILITOTAL 0.6 0.4     Most Recent Hemoglobin A1c:  Recent Labs   Lab Test 03/22/25  1731   A1C 12.7*     Most Recent Anemia Panel:  Recent Labs   Lab Test 05/21/25  1152 05/15/25  0850 04/23/25  0623 04/21/25  0814 04/07/25  0542 04/06/25  0431 04/05/25  0451 04/19/23  1510 01/17/23  1505   WBC 9.3 10.6   < > 9.7   < > 7.3 7.7   < > 10.1   HGB 12.3 12.5   < > 11.2*   < > 10.1* 9.9*   < > 17.1*   HCT 38.1 39.2   < > 34.8*   < > 31.2* 32.5*   < > 50.1*   MCV 91 94   < > 96   < > 94 96   < > 88    425   < > 650*   < > 282 292   < > 263   IRON  --   --   --   --   --   --  51  --   --    IRONSAT  --   --   --   --   --   --  27  --   --    RETICABSCT  --   --   --  0.116*  --   --   --   --   --    RETP  --   --   --  3.2*  --   --   --   --   --    FEB  --   --   --   --   --   --  188*  --   --    OLIVA  --   --   --   --   --   --  407*  --   --    B12  --  559  --   --   --   --  859   < >  --    FOLIC  --   --   --   --   --  4.9  --   --   --    EPOE  --   --   --   --   --   --   --   --  7    < > = values in this interval not displayed.       ASSESSMENT/PLAN:  Physical deconditioning  Generalized muscle weakness  Unable to care for self  Intellectual delay  Comment: Chronic. S/T prolonged hospitalization. Per therapy- Patient requires set up for UB needs, ModA for LB needs. Ambulates up to 150ft 2WW CGA.   Plan:  -Continue Physical therapy and Occupational therapy as directed  -SW to remain involved for safe discharge planning needs  -Recommend LTC placement post TCU     Acute on Chronic Hypoxic Respiratory Failure  History of VAP  COPD  ADEEL  PE, Acute, segmental, subsegmental  Pulmonary emphysema  Comment: Acute on chronic. Admitted to [facility] on 3/22/25: She had a repeat CTA that demonstrated progression of her PE and  RLL collapse concerning for mucous plugging and pneumonia. Primary team has been having issues w/ progressively increasing FiO2 needs. She had a sputum culture grow Candida & has been on fluconazole since 3/25. Transferred to [facility] d/t concerns that she might require a tracheostomy secondary to her inability to liberate from the vent. At [facility] patient was admitted to the ICU on a ventilator, successfully extubated 4/1 and downgraded to floor status 4/3. Infectious disease consulted and continued patient on Zosyn course to be completed through 4/17/2025. Patient was able to liberate from ventilator withOUT tracheostomy formation.   Plan:  -Monitor respiratory status  -Oxygen 3L via oxymask overnights.   -Encourage incentive spirometry every 4 hours while awake  -Continue apixaban 5mg BID  -Continue albuterol nebulization every 2 hour PRN  -Continue umeclidinium-vilanterol (anoro ellipta) 62.5-25mcg inhaler daily  -CMP and CBC due 5/28/25, but was missed therefore was rescheduled for today 5/29/25--results pending  -Trend labs periodically while on TCU     Perineal Abscess  Condyloma Acuminata  Comment: Acute on chronic. Per recent hospitalization-General surgery consulted for right keiry/perineal abscess and performed incision and drainage with Penrose drain placement 3/31 with subsequent repeat I&D 4/2 and 4/8. s/p acyclovir and I&D x3. Noted on 4/28/25-5/4/25: Wound vac remains--penrose drain removed--packing to tract along/under R labia. Landrum catheter successfully removed.   Plan:  -Monitor for worsening s/symptoms of concerns  -Air mattress while on site  -Follow up with wound clinic as directed. Scheduled for 6/10/25  -Monitor pain complaints  -Continue Tylenol PRN  -CMP and CBC due 5/28/25, but was missed therefore was rescheduled for today 5/29/25--results pending  -Trend labs periodically while on TCU  -Continue wound care as directed per vascular  *Change every other day and  PRN  Cleanse your right groin wound(s) with Dilute hibiclens 30cc in 500cc NS.  Pat Dry with non-sterile gauze  Apply small amout of clotrimazole cream to the periwound rash use 1/2 pea sized amount  Primary Dressing: Apply silvercel or aquacel ag into/onto the wounds  Secondary dressing: Cover with bordered foam; such as 4x8 mepilex border  It is ok to get your wound wet in the bath or shower     Seborrheic dermatitis:   Comment: Acute on chronic. Noted to face PTA  Plan:  -Monitor for worsening s/symptoms of concerns     T2DM  Poorly controlled as outpatient with A1c 12.7 in march 2025. Used to be on insulin pump. No longer on it due to intellectual delay.  Plan:  -Reduce Blood Glucose down to BID using Freestyle Bud 2 device. Change device every 14 days and PRN. Update provider if Blood Glucose<70 and.or >450  -Continue jardiance 25mg daily  -Monitor hgb A1c every 3 months. Due June 2025  -Continue insulin glargine 38 units at HS. HOLD if Blood Glucose<120  -CMP and CBC due 5/28/25, but was missed therefore was rescheduled for today 5/29/25--results pending  -Trend labs periodically while on TCU          CKD stage 4  YADIRA  Comment: Chronic. Baseline creatinine~1.1-1.3  Plan:  -Monitor urinary status  -Monitor for worsening s/symptoms of concerns  -CMP and CBC due 5/28/25, but was missed therefore was rescheduled for today 5/29/25--results pending  -Trend labs periodically while on TCU     Essential hypertension   Hyperlipidemia, unspecified hyperlipidemia type   Comment: Chronic. Based on JNC-8 goals,  patients age of 59 year old, presence of diabetes or CKD, and goals of care goal BP is  <140/90 mm Hg. Patient is stable with current plan of care and routine assessment..  Plan:  -Monitor BP and HR as directed  -Continue amlodipine 5mg daily. HOLD if SBP<100  -Continue atorvastatin 40mg daily  -Continue apixaban 5mg BID  -CMP and CBC due 5/28/25, but was missed therefore was rescheduled for today 5/29/25--results  pending  -Trend labs periodically while on TCU         Reactive thrombocytosis  Comment: Chronic. Platelets historically in normal range (reviewed last 3 years). Peaked at 700,000, now resolved--Likely  reactive - reviewed flow chart for acute thrombocytosis, posted in note. Normal peripheral smear, liver enzymes  Plan;  -Monitor bleeding risks  -Monitor for worsening s/symptoms of concerns  -CMP and CBC due 5/28/25, but was missed therefore was rescheduled for today 5/29/25--results pending  -Trend labs periodically while on TCU     Electronically signed by:  Dr. Jayshree Montenegro DNP, APRN, FNP-C, WCS-C, EDS-C     Provider reviewed records from facility, and interpreted most recent imaging/lab work, and vital signs.   Acute and chronic conditions managed by writer. Have been reviewed during today's exam         Sincerely,        Jayshree Montenegro, BLAIR CNP    Electronically signed

## 2025-06-01 NOTE — PROGRESS NOTES
[facility]    Chief Complaint   Patient presents with    RECHECK     HPI:  Marly Cannon is a 59 year old  (1965), who is being seen today for an episodic care visit at: [facility]) [[id number]]. Today's concern is: The primary encounter diagnosis was Physical deconditioning. Diagnoses of Acute on chronic respiratory failure with hypoxia and hypercapnia (H), Generalized muscle weakness, History of pneumonia, Chronic obstructive pulmonary disease with acute exacerbation (H), ADEEL (obstructive sleep apnea), Perineal abscess, Condyloma acuminata, Acute pulmonary embolism, unspecified pulmonary embolism type, unspecified whether acute cor pulmonale present (H), Pulmonary emphysema, unspecified emphysema type (H), Type 2 diabetes, HbA1c goal < 7% (H), Diabetic polyneuropathy associated with diabetes mellitus due to underlying condition (H), YADIRA (acute kidney injury), Chronic kidney disease, stage 4 (severe) (H), PAF (paroxysmal atrial fibrillation) (H), Essential hypertension, Hyperlipidemia, unspecified hyperlipidemia type, Intellectual delay, Unable to care for self, Seborrheic dermatitis, Reactive thrombocytosis, Open wound, Other acute pulmonary embolism without acute cor pulmonale (H), Type 2 diabetes mellitus with stage 3 chronic kidney disease, with long-term current use of insulin, unspecified whether stage 3a or 3b CKD (H), and Slow transit constipation were also pertinent to this visit.    Met with patient who denies any chest pain, palpitations, shortness of breath, RUBY, lightheadedness, dizziness, or cough. Denies any abdominal discomfort. Denies N&V. Denies B&B concerns. Denies dysuria or frequency. Denies loose or constipation. Appetite good. Sleeping well. No pain complaints.     BP Readings from Last 3 Encounters:   06/02/25 108/72   05/29/25 131/62   05/28/25 (!) 141/81     Wt Readings from Last 5 Encounters:   06/02/25 83 kg (183 lb)  "  05/29/25 83 kg (183 lb)   05/28/25 83.1 kg (183 lb 3.2 oz)   05/21/25 83 kg (183 lb)   05/19/25 83.6 kg (184 lb 6.4 oz)     Allergies, and PMH/PSH reviewed in [facility] today.  REVIEW OF SYSTEMS:  Limited secondary to cognitive impairment but today pt reports as noted per HPI    Objective:   /72   Pulse 83   Temp 97.7  F (36.5  C)   Resp 18   Ht 1.651 m (5' 5\")   Wt 83 kg (183 lb)   SpO2 92%   BMI 30.45 kg/m    GENERAL APPEARANCE:  Alert, in no distress, oriented, cooperative  ENT:  Mouth and posterior oropharynx normal, moist mucous membranes, [place]  EYES:  EOM, conjunctivae, lids, pupils and irises normal  NECK:  No adenopathy,masses or thyromegaly  RESP:  respiratory effort and palpation of chest normal, lungs clear to auscultation , no respiratory distress  CV:  regular rate and rhythm, no murmur, rub, or gallop, no edema, +2 pedal pulses  ABDOMEN:  normal bowel sounds, soft, nontender, no hepatosplenomegaly or other masses, no guarding or rebound  M/S:   Ambulates with walker. Staff to assist for all ADLs, transfers and cares  SKIN:  Inspection of skin and subcutaneous tissue baseline, wound healing well, no signs of infection improving slowly  NEURO:   Cranial nerves 2-12 are normal tested and grossly at patient's baseline, no purposeful movement in upper and lower extremities  PSYCH:  oriented X 3, memory impaired , affect and mood normal        Labs done in SNF are in [facility]. Please refer to them using [employer]/Care Everywhere. and Recent labs in [facility] reviewed by me today.     ASSESSMENT/PLAN:  Physical deconditioning  Generalized muscle weakness  Unable to care for self  Intellectual delay  Comment: Chronic. S/T prolonged hospitalization. Per therapy- Patient requires set up for UB needs, ModA for LB needs. Ambulates up to 150ft 2WW CGA.   Plan:  -Continue Physical therapy and Occupational therapy as directed  -SW to remain involved for safe discharge planning needs  -Recommend LTC placement post " TCU     Acute on Chronic Hypoxic Respiratory Failure  History of VAP  COPD  ADEEL  PE, Acute, segmental, subsegmental  Pulmonary emphysema  Comment: Acute on chronic. Admitted to [facility] on 3/22/25: She had a repeat CTA that demonstrated progression of her PE and RLL collapse concerning for mucous plugging and pneumonia. Primary team has been having issues w/ progressively increasing FiO2 needs. She had a sputum culture grow Candida & has been on fluconazole since 3/25. Transferred to [facility] d/t concerns that she might require a tracheostomy secondary to her inability to liberate from the vent. At [facility] patient was admitted to the ICU on a ventilator, successfully extubated 4/1 and downgraded to floor status 4/3. Infectious disease consulted and continued patient on Zosyn course to be completed through 4/17/2025. Patient was able to liberate from ventilator withOUT tracheostomy formation.   Plan:  -Monitor respiratory status  -Oxygen 3L via oxymask overnights.   -Encourage incentive spirometry every 4 hours while awake  -Continue apixaban 5mg BID  -Continue albuterol nebulization every 2 hour PRN  -Continue umeclidinium-vilanterol (anoro ellipta) 62.5-25mcg inhaler daily  -Trend labs periodically while on TCU     Perineal Abscess  Condyloma Acuminata  Comment: Acute on chronic. Per recent hospitalization-General surgery consulted for right keiry/perineal abscess and performed incision and drainage with Penrose drain placement 3/31 with subsequent repeat I&D 4/2 and 4/8. s/p acyclovir and I&D x3. Noted on 4/28/25-5/4/25: Wound vac remains--penrose drain removed--packing to tract along/under R labia. Landrum catheter successfully removed.   Plan:  -Monitor for worsening s/symptoms of concerns  -Air mattress while on site  -Follow up with wound clinic as directed. Scheduled for 6/10/25  -Monitor pain complaints  -Continue Tylenol PRN  -Trend labs periodically while on TCU  -Continue wound  care as directed per vascular  *Change every other day and PRN  Cleanse your right groin wound(s) with Dilute hibiclens 30cc in 500cc NS.  Pat Dry with non-sterile gauze  Apply small amout of clotrimazole cream to the periwound rash use 1/2 pea sized amount  Primary Dressing: Apply silvercel or aquacel ag into/onto the wounds  Secondary dressing: Cover with bordered foam; such as 4x8 mepilex border  It is ok to get your wound wet in the bath or shower     Seborrheic dermatitis:   Comment: Acute on chronic. Noted to face PTA  Plan:  -Monitor for worsening s/symptoms of concerns     T2DM  Poorly controlled as outpatient with A1c 12.7 in march 2025. Used to be on insulin pump. No longer on it due to intellectual delay.  Plan:  -Reduce Blood Glucose down to BID using Freestyle Bud 2 device. Change device every 14 days and PRN. Update provider if Blood Glucose<70 and.or >450  -Continue jardiance 25mg daily  -Monitor hgb A1c every 3 months. Due June 2025  -Continue insulin glargine 38 units at HS. HOLD if Blood Glucose<120  -Trend labs periodically while on TCU     CKD stage 4  YADIRA  Comment: Chronic. Baseline creatinine~1.1-1.3  Plan:  -Monitor urinary status  -Monitor for worsening s/symptoms of concerns  -Trend labs periodically while on TCU     Essential hypertension   Hyperlipidemia, unspecified hyperlipidemia type   Comment: Chronic. Based on JNC-8 goals,  patients age of 59 year old, presence of diabetes or CKD, and goals of care goal BP is  <140/90 mm Hg. Patient is stable with current plan of care and routine assessment..  Plan:  -Monitor BP and HR as directed  -Continue amlodipine 5mg daily. HOLD if SBP<100  -Continue atorvastatin 40mg daily  -Continue apixaban 5mg BID  -Trend labs periodically while on TCU     Reactive thrombocytosis  Comment: Chronic. Platelets historically in normal range (reviewed last 3 years). Peaked at 700,000, now resolved--Likely  reactive - reviewed flow chart for acute  thrombocytosis, posted in note. Normal peripheral smear, liver enzymes  Plan;  -Monitor bleeding risks  -Monitor for worsening s/symptoms of concerns  -Trend labs periodically while on TCU     Electronically signed by:  Dr. Jayshree Montenegro DNP, APRN, FNP-C, WCS-C, EDS-C     Provider reviewed records from facility, and interpreted most recent imaging/lab work, and vital signs.   Acute and chronic conditions managed by writer. Have been reviewed during today's exam

## 2025-06-02 ENCOUNTER — TRANSITIONAL CARE UNIT VISIT (OUTPATIENT)
Dept: GERIATRICS | Facility: CLINIC | Age: 60
End: 2025-06-02
Payer: COMMERCIAL

## 2025-06-02 VITALS
TEMPERATURE: 97.7 F | RESPIRATION RATE: 18 BRPM | HEIGHT: 65 IN | OXYGEN SATURATION: 92 % | HEART RATE: 83 BPM | WEIGHT: 183 LBS | BODY MASS INDEX: 30.49 KG/M2 | SYSTOLIC BLOOD PRESSURE: 108 MMHG | DIASTOLIC BLOOD PRESSURE: 72 MMHG

## 2025-06-02 DIAGNOSIS — L21.9 SEBORRHEIC DERMATITIS: ICD-10-CM

## 2025-06-02 DIAGNOSIS — K59.01 SLOW TRANSIT CONSTIPATION: ICD-10-CM

## 2025-06-02 DIAGNOSIS — E08.42 DIABETIC POLYNEUROPATHY ASSOCIATED WITH DIABETES MELLITUS DUE TO UNDERLYING CONDITION (H): ICD-10-CM

## 2025-06-02 DIAGNOSIS — N17.9 AKI (ACUTE KIDNEY INJURY): ICD-10-CM

## 2025-06-02 DIAGNOSIS — I26.99 ACUTE PULMONARY EMBOLISM, UNSPECIFIED PULMONARY EMBOLISM TYPE, UNSPECIFIED WHETHER ACUTE COR PULMONALE PRESENT (H): ICD-10-CM

## 2025-06-02 DIAGNOSIS — I48.0 PAF (PAROXYSMAL ATRIAL FIBRILLATION) (H): ICD-10-CM

## 2025-06-02 DIAGNOSIS — A63.0 CONDYLOMA ACUMINATA: ICD-10-CM

## 2025-06-02 DIAGNOSIS — E11.22 TYPE 2 DIABETES MELLITUS WITH STAGE 3 CHRONIC KIDNEY DISEASE, WITH LONG-TERM CURRENT USE OF INSULIN, UNSPECIFIED WHETHER STAGE 3A OR 3B CKD (H): ICD-10-CM

## 2025-06-02 DIAGNOSIS — I26.99 OTHER ACUTE PULMONARY EMBOLISM WITHOUT ACUTE COR PULMONALE (H): ICD-10-CM

## 2025-06-02 DIAGNOSIS — N18.30 TYPE 2 DIABETES MELLITUS WITH STAGE 3 CHRONIC KIDNEY DISEASE, WITH LONG-TERM CURRENT USE OF INSULIN, UNSPECIFIED WHETHER STAGE 3A OR 3B CKD (H): ICD-10-CM

## 2025-06-02 DIAGNOSIS — M62.81 GENERALIZED MUSCLE WEAKNESS: ICD-10-CM

## 2025-06-02 DIAGNOSIS — G47.33 OSA (OBSTRUCTIVE SLEEP APNEA): ICD-10-CM

## 2025-06-02 DIAGNOSIS — D75.838 REACTIVE THROMBOCYTOSIS: ICD-10-CM

## 2025-06-02 DIAGNOSIS — J96.21 ACUTE ON CHRONIC RESPIRATORY FAILURE WITH HYPOXIA AND HYPERCAPNIA (H): ICD-10-CM

## 2025-06-02 DIAGNOSIS — Z79.4 TYPE 2 DIABETES MELLITUS WITH STAGE 3 CHRONIC KIDNEY DISEASE, WITH LONG-TERM CURRENT USE OF INSULIN, UNSPECIFIED WHETHER STAGE 3A OR 3B CKD (H): ICD-10-CM

## 2025-06-02 DIAGNOSIS — L02.215 PERINEAL ABSCESS: ICD-10-CM

## 2025-06-02 DIAGNOSIS — J43.9 PULMONARY EMPHYSEMA, UNSPECIFIED EMPHYSEMA TYPE (H): ICD-10-CM

## 2025-06-02 DIAGNOSIS — I10 ESSENTIAL HYPERTENSION: ICD-10-CM

## 2025-06-02 DIAGNOSIS — R53.81 PHYSICAL DECONDITIONING: Primary | ICD-10-CM

## 2025-06-02 DIAGNOSIS — Z87.01 HISTORY OF PNEUMONIA: ICD-10-CM

## 2025-06-02 DIAGNOSIS — T14.8XXA OPEN WOUND: ICD-10-CM

## 2025-06-02 DIAGNOSIS — J96.22 ACUTE ON CHRONIC RESPIRATORY FAILURE WITH HYPOXIA AND HYPERCAPNIA (H): ICD-10-CM

## 2025-06-02 DIAGNOSIS — J44.1 CHRONIC OBSTRUCTIVE PULMONARY DISEASE WITH ACUTE EXACERBATION (H): ICD-10-CM

## 2025-06-02 DIAGNOSIS — E78.5 HYPERLIPIDEMIA, UNSPECIFIED HYPERLIPIDEMIA TYPE: ICD-10-CM

## 2025-06-02 DIAGNOSIS — E11.9 TYPE 2 DIABETES, HBA1C GOAL < 7% (H): ICD-10-CM

## 2025-06-02 DIAGNOSIS — F81.9 INTELLECTUAL DELAY: ICD-10-CM

## 2025-06-02 DIAGNOSIS — N18.4 CHRONIC KIDNEY DISEASE, STAGE 4 (SEVERE) (H): ICD-10-CM

## 2025-06-02 DIAGNOSIS — Z78.9 UNABLE TO CARE FOR SELF: ICD-10-CM

## 2025-06-02 PROCEDURE — 99309 SBSQ NF CARE MODERATE MDM 30: CPT | Performed by: NURSE PRACTITIONER

## 2025-06-02 NOTE — LETTER
6/2/2025      Marly Cannon  [address]  [place] [address]        [facility]    Chief Complaint   Patient presents with     RECHECK     HPI:  Marly Cannon is a 59 year old  (1965), who is being seen today for an episodic care visit at: [facility] ([facility])([facility]) [[id number]]. Today's concern is: The primary encounter diagnosis was Physical deconditioning. Diagnoses of Acute on chronic respiratory failure with hypoxia and hypercapnia (H), Generalized muscle weakness, History of pneumonia, Chronic obstructive pulmonary disease with acute exacerbation (H), ADEEL (obstructive sleep apnea), Perineal abscess, Condyloma acuminata, Acute pulmonary embolism, unspecified pulmonary embolism type, unspecified whether acute cor pulmonale present (H), Pulmonary emphysema, unspecified emphysema type (H), Type 2 diabetes, HbA1c goal < 7% (H), Diabetic polyneuropathy associated with diabetes mellitus due to underlying condition (H), YADIRA (acute kidney injury), Chronic kidney disease, stage 4 (severe) (H), PAF (paroxysmal atrial fibrillation) (H), Essential hypertension, Hyperlipidemia, unspecified hyperlipidemia type, Intellectual delay, Unable to care for self, Seborrheic dermatitis, Reactive thrombocytosis, Open wound, Other acute pulmonary embolism without acute cor pulmonale (H), Type 2 diabetes mellitus with stage 3 chronic kidney disease, with long-term current use of insulin, unspecified whether stage 3a or 3b CKD (H), and Slow transit constipation were also pertinent to this visit.    Met with patient who denies any chest pain, palpitations, shortness of breath, RUBY, lightheadedness, dizziness, or cough. Denies any abdominal discomfort. Denies N&V. Denies B&B concerns. Denies dysuria or frequency. Denies loose or constipation. Appetite good. Sleeping well. No pain complaints.     BP Readings from Last 3 Encounters:   06/02/25 108/72   05/29/25 131/62   05/28/25 (!)  "141/81     Wt Readings from Last 5 Encounters:   06/02/25 83 kg (183 lb)   05/29/25 83 kg (183 lb)   05/28/25 83.1 kg (183 lb 3.2 oz)   05/21/25 83 kg (183 lb)   05/19/25 83.6 kg (184 lb 6.4 oz)     Allergies, and PMH/PSH reviewed in [facility] today.  REVIEW OF SYSTEMS:  Limited secondary to cognitive impairment but today pt reports as noted per HPI    Objective:   /72   Pulse 83   Temp 97.7  F (36.5  C)   Resp 18   Ht 1.651 m (5' 5\")   Wt 83 kg (183 lb)   SpO2 92%   BMI 30.45 kg/m    GENERAL APPEARANCE:  Alert, in no distress, oriented, cooperative  ENT:  Mouth and posterior oropharynx normal, moist mucous membranes, [place]  EYES:  EOM, conjunctivae, lids, pupils and irises normal  NECK:  No adenopathy,masses or thyromegaly  RESP:  respiratory effort and palpation of chest normal, lungs clear to auscultation , no respiratory distress  CV:  regular rate and rhythm, no murmur, rub, or gallop, no edema, +2 pedal pulses  ABDOMEN:  normal bowel sounds, soft, nontender, no hepatosplenomegaly or other masses, no guarding or rebound  M/S:   Ambulates with walker. Staff to assist for all ADLs, transfers and cares  SKIN:  Inspection of skin and subcutaneous tissue baseline, wound healing well, no signs of infection improving slowly  NEURO:   Cranial nerves 2-12 are normal tested and grossly at patient's baseline, no purposeful movement in upper and lower extremities  PSYCH:  oriented X 3, memory impaired , affect and mood normal        Labs done in SNF are in [facility]. Please refer to them using [employer]/Care Everywhere. and Recent labs in [facility] reviewed by me today.     ASSESSMENT/PLAN:  Physical deconditioning  Generalized muscle weakness  Unable to care for self  Intellectual delay  Comment: Chronic. S/T prolonged hospitalization. Per therapy- Patient requires set up for UB needs, ModA for LB needs. Ambulates up to 150ft 2WW CGA.   Plan:  -Continue Physical therapy and Occupational therapy as directed  -SW to remain " involved for safe discharge planning needs  -Recommend LTC placement post TCU     Acute on Chronic Hypoxic Respiratory Failure  History of VAP  COPD  ADEEL  PE, Acute, segmental, subsegmental  Pulmonary emphysema  Comment: Acute on chronic. Admitted to [facility] on 3/22/25: She had a repeat CTA that demonstrated progression of her PE and RLL collapse concerning for mucous plugging and pneumonia. Primary team has been having issues w/ progressively increasing FiO2 needs. She had a sputum culture grow Candida & has been on fluconazole since 3/25. Transferred to [facility] d/t concerns that she might require a tracheostomy secondary to her inability to liberate from the vent. At [facility] patient was admitted to the ICU on a ventilator, successfully extubated 4/1 and downgraded to floor status 4/3. Infectious disease consulted and continued patient on Zosyn course to be completed through 4/17/2025. Patient was able to liberate from ventilator withOUT tracheostomy formation.   Plan:  -Monitor respiratory status  -Oxygen 3L via oxymask overnights.   -Encourage incentive spirometry every 4 hours while awake  -Continue apixaban 5mg BID  -Continue albuterol nebulization every 2 hour PRN  -Continue umeclidinium-vilanterol (anoro ellipta) 62.5-25mcg inhaler daily  -Trend labs periodically while on TCU     Perineal Abscess  Condyloma Acuminata  Comment: Acute on chronic. Per recent hospitalization-General surgery consulted for right keiry/perineal abscess and performed incision and drainage with Penrose drain placement 3/31 with subsequent repeat I&D 4/2 and 4/8. s/p acyclovir and I&D x3. Noted on 4/28/25-5/4/25: Wound vac remains--penrose drain removed--packing to tract along/under R labia. Landrum catheter successfully removed.   Plan:  -Monitor for worsening s/symptoms of concerns  -Air mattress while on site  -Follow up with wound clinic as directed. Scheduled for 6/10/25  -Monitor pain  complaints  -Continue Tylenol PRN  -Trend labs periodically while on TCU  -Continue wound care as directed per vascular  *Change every other day and PRN  Cleanse your right groin wound(s) with Dilute hibiclens 30cc in 500cc NS.  Pat Dry with non-sterile gauze  Apply small amout of clotrimazole cream to the periwound rash use 1/2 pea sized amount  Primary Dressing: Apply silvercel or aquacel ag into/onto the wounds  Secondary dressing: Cover with bordered foam; such as 4x8 mepilex border  It is ok to get your wound wet in the bath or shower     Seborrheic dermatitis:   Comment: Acute on chronic. Noted to face PTA  Plan:  -Monitor for worsening s/symptoms of concerns     T2DM  Poorly controlled as outpatient with A1c 12.7 in march 2025. Used to be on insulin pump. No longer on it due to intellectual delay.  Plan:  -Reduce Blood Glucose down to BID using Freestyle Bud 2 device. Change device every 14 days and PRN. Update provider if Blood Glucose<70 and.or >450  -Continue jardiance 25mg daily  -Monitor hgb A1c every 3 months. Due June 2025  -Continue insulin glargine 38 units at HS. HOLD if Blood Glucose<120  -Trend labs periodically while on TCU     CKD stage 4  YADIRA  Comment: Chronic. Baseline creatinine~1.1-1.3  Plan:  -Monitor urinary status  -Monitor for worsening s/symptoms of concerns  -Trend labs periodically while on TCU     Essential hypertension   Hyperlipidemia, unspecified hyperlipidemia type   Comment: Chronic. Based on JNC-8 goals,  patients age of 59 year old, presence of diabetes or CKD, and goals of care goal BP is  <140/90 mm Hg. Patient is stable with current plan of care and routine assessment..  Plan:  -Monitor BP and HR as directed  -Continue amlodipine 5mg daily. HOLD if SBP<100  -Continue atorvastatin 40mg daily  -Continue apixaban 5mg BID  -Trend labs periodically while on TCU     Reactive thrombocytosis  Comment: Chronic. Platelets historically in normal range (reviewed last 3 years). Peaked  at 700,000, now resolved--Likely  reactive - reviewed flow chart for acute thrombocytosis, posted in note. Normal peripheral smear, liver enzymes  Plan;  -Monitor bleeding risks  -Monitor for worsening s/symptoms of concerns  -Trend labs periodically while on TCU     Electronically signed by:  Dr. Jayshree Montenegro DNP, APRN, FNP-C, WCS-C, EDS-C     Provider reviewed records from facility, and interpreted most recent imaging/lab work, and vital signs.   Acute and chronic conditions managed by writer. Have been reviewed during today's exam        Sincerely,        Jayshree Montenegro, BLAIR CNP    Electronically signed

## 2025-06-03 NOTE — PROGRESS NOTES
[facility] GERIATRICS    Chief Complaint   Patient presents with    RECHECK     HPI:  Marly Cannon is a 59 year old  (1965), who is being seen today for an episodic care visit at: [facility]) [[id number]]. Today's concern is: The primary encounter diagnosis was Physical deconditioning. Diagnoses of Generalized muscle weakness, Acute on chronic respiratory failure with hypoxia and hypercapnia (H), History of pneumonia, Chronic obstructive pulmonary disease with acute exacerbation (H), ADEEL (obstructive sleep apnea), Perineal abscess, Condyloma acuminata, Acute pulmonary embolism, unspecified pulmonary embolism type, unspecified whether acute cor pulmonale present (H), Pulmonary emphysema, unspecified emphysema type (H), Type 2 diabetes, HbA1c goal < 7% (H), Diabetic polyneuropathy associated with diabetes mellitus due to underlying condition (H), YADIRA (acute kidney injury), Chronic kidney disease, stage 4 (severe) (H), PAF (paroxysmal atrial fibrillation) (H), Essential hypertension, Hyperlipidemia, unspecified hyperlipidemia type, Intellectual delay, Unable to care for self, Seborrheic dermatitis, Reactive thrombocytosis, Open wound, Other acute pulmonary embolism without acute cor pulmonale (H), Type 2 diabetes mellitus with stage 3 chronic kidney disease, with long-term current use of insulin, unspecified whether stage 3a or 3b CKD (H), and Slow transit constipation were also pertinent to this visit.    Met with patient who denies any chest pain, palpitations, shortness of breath, RUBY, lightheadedness, dizziness, or cough. Denies any abdominal discomfort. Denies N&V. Denies B&B concerns. Denies dysuria or frequency. Denies loose or constipation. Appetite good. Sleeping well. Pain stable. New increased redness flare and itch noted to skin and scalp. Will resume topical needs per acceptance.     BP Readings from Last 3 Encounters:   06/04/25 125/66   06/02/25 108/72  "  05/29/25 131/62     Wt Readings from Last 5 Encounters:   06/04/25 83 kg (183 lb)   06/02/25 83 kg (183 lb)   05/29/25 83 kg (183 lb)   05/28/25 83.1 kg (183 lb 3.2 oz)   05/21/25 83 kg (183 lb)     Allergies, and PMH/PSH reviewed in EPIC today.  REVIEW OF SYSTEMS:  Limited secondary to cognitive impairment but today pt reports as note per HPI    Objective:   /66   Pulse 72   Temp 97.9  F (36.6  C)   Resp 18   Ht 1.651 m (5' 5\")   Wt 83 kg (183 lb)   SpO2 100%   BMI 30.45 kg/m    GENERAL APPEARANCE:  Alert, in no distress, cooperative  ENT:  Mouth and posterior oropharynx normal, moist mucous membranes, [place]  EYES:  EOM, conjunctivae, lids, pupils and irises normal  NECK:  No adenopathy,masses or thyromegaly  RESP:  respiratory effort and palpation of chest normal, lungs clear to auscultation , no respiratory distress  CV:  regular rate and rhythm, no murmur, rub, or gallop, no edema, +2 pedal pulses  ABDOMEN:  normal bowel sounds, soft, nontender, no hepatosplenomegaly or other masses, no guarding or rebound  M/S:   Ambulates with walker SBA  SKIN:  Inspection of skin and subcutaneous tissue baseline, wound healing well, no signs of infection improving  NEURO:   Cranial nerves 2-12 are normal tested and grossly at patient's baseline, no purposeful movement in upper and lower extremities  PSYCH:  oriented X 3, memory impaired , affect and mood normal        Most Recent 3 CBC's:  Recent Labs   Lab Test 05/29/25  0840 05/21/25  1152 05/15/25  0850   WBC 9.4 9.3 10.6   HGB 13.2 12.3 12.5   MCV 93 91 94    382 425     Most Recent 3 BMP's:  Recent Labs   Lab Test 05/29/25  0840 05/21/25  1152 05/15/25  0850    134* 137   POTASSIUM 4.6 4.4 4.4   CHLORIDE 101 97* 98   CO2 26 23 25   BUN 34.8* 41.4* 48.6*   CR 1.46* 1.56* 1.25*   ANIONGAP 11 14 14   NORM 10.2 9.7 10.0   * 218* 164*     Most Recent 2 LFT's:  Recent Labs   Lab Test 05/29/25  0840 05/15/25  0850   AST 21 24   ALT 15 11 "   ALKPHOS 88 96   BILITOTAL 0.6 0.6     Most Recent Hemoglobin A1c:  Recent Labs   Lab Test 03/22/25  1731   A1C 12.7*     Most Recent Anemia Panel:  Recent Labs   Lab Test 05/29/25  0840 05/21/25  1152 05/15/25  0850 04/23/25  0623 04/21/25  0814 04/07/25  0542 04/06/25  0431 04/05/25  0451 04/19/23  1510 01/17/23  1505   WBC 9.4   < > 10.6   < > 9.7   < > 7.3 7.7   < > 10.1   HGB 13.2   < > 12.5   < > 11.2*   < > 10.1* 9.9*   < > 17.1*   HCT 41.7   < > 39.2   < > 34.8*   < > 31.2* 32.5*   < > 50.1*   MCV 93   < > 94   < > 96   < > 94 96   < > 88      < > 425   < > 650*   < > 282 292   < > 263   IRON  --   --   --   --   --   --   --  51  --   --    IRONSAT  --   --   --   --   --   --   --  27  --   --    RETICABSCT  --   --   --   --  0.116*  --   --   --   --   --    RETP  --   --   --   --  3.2*  --   --   --   --   --    FEB  --   --   --   --   --   --   --  188*  --   --    OLIVA  --   --   --   --   --   --   --  407*  --   --    B12  --   --  559  --   --   --   --  859   < >  --    FOLIC  --   --   --   --   --   --  4.9  --   --   --    EPOE  --   --   --   --   --   --   --   --   --  7    < > = values in this interval not displayed.       ASSESSMENT/PLAN:  Physical deconditioning  Generalized muscle weakness  Unable to care for self  Intellectual delay  Comment: Chronic. S/T prolonged hospitalization. Per therapy- Patient requires set up for UB needs, Supv for LB needs. Ambulates up to 150ft 2WW CGA. CPT 4.7/5.6  Plan:  -Continue Physical therapy and Occupational therapy as directed--projected LCD 6/12  -SW to remain involved for safe discharge planning needs  -Recommend LTC placement post TCU in [place] where she wishes to be near family  -Staff to start [facility] program for medications/insulin for education to assist with discharge potential needs     Acute on Chronic Hypoxic Respiratory Failure  History of VAP  COPD  ADEEL  PE, Acute, segmental, subsegmental  Pulmonary emphysema  Comment: Acute on  chronic. Admitted to [facility] on 3/22/25: She had a repeat CTA that demonstrated progression of her PE and RLL collapse concerning for mucous plugging and pneumonia. Primary team has been having issues w/ progressively increasing FiO2 needs. She had a sputum culture grow Candida & has been on fluconazole since 3/25. Transferred to [facility] d/t concerns that she might require a tracheostomy secondary to her inability to liberate from the vent. At [facility] patient was admitted to the ICU on a ventilator, successfully extubated 4/1 and downgraded to floor status 4/3. Infectious disease consulted and continued patient on Zosyn course to be completed through 4/17/2025. Patient was able to liberate from ventilator withOUT tracheostomy formation.   Plan:  -Monitor respiratory status  -Oxygen 3L via oxymask overnights.   -Encourage incentive spirometry every 4 hours while awake  -Continue apixaban 5mg BID  -Continue albuterol nebulization every 2 hour PRN  -Continue umeclidinium-vilanterol (anoro ellipta) 62.5-25mcg inhaler daily  -Trend labs periodically while on TCU     Perineal Abscess  Condyloma Acuminata  Comment: Acute on chronic. Per recent hospitalization-General surgery consulted for right keiry/perineal abscess and performed incision and drainage with Penrose drain placement 3/31 with subsequent repeat I&D 4/2 and 4/8. s/p acyclovir and I&D x3. Noted on 4/28/25-5/4/25: Wound vac remains--penrose drain removed--packing to tract along/under R labia. Landrum catheter successfully removed.   Plan:  -Monitor for worsening s/symptoms of concerns  -Air mattress while on site  -Follow up with wound clinic as directed. Scheduled for 6/10/25  -Monitor pain complaints  -Continue Tylenol PRN  -Trend labs periodically while on TCU  -Continue wound care as directed per vascular  *Change every other day and PRN  Cleanse your right groin wound(s) with Dilute hibiclens 30cc in 500cc NS.  Pat Dry with  non-sterile gauze  Apply small amout of clotrimazole cream to the periwound rash use 1/2 pea sized amount  Primary Dressing: Apply silvercel or aquacel ag into/onto the wounds  Secondary dressing: Cover with bordered foam; such as 4x8 mepilex border  It is ok to get your wound wet in the bath or shower     Seborrheic dermatitis:   Comment: Acute on chronic. Noted to face PTA--see photo above  Plan:  -Monitor for worsening s/symptoms of concerns  -Dermatology PRN if symptoms worsen  -Restart triamcinolone cream to face BID x 2 weeks  -Start ketoconazole 2% shampoo- apply to scalp and body during showers     T2DM  Poorly controlled as outpatient with A1c 12.7 in march 2025. Used to be on insulin pump. No longer on it due to intellectual delay.  Plan:  -Continue Blood Glucose down to BID using Freestyle Bud 2 device. Change device every 14 days and PRN. Update provider if Blood Glucose<70 and.or >450  -Continue jardiance 25mg daily  -Monitor hgb A1c every 3 months. Due June 2025  -Continue insulin glargine 38 units at HS. HOLD if Blood Glucose<120  -Trend labs periodically while on TCU         CKD stage 4  YADIRA  Comment: Chronic. Baseline creatinine~1.1-1.3  Plan:  -Monitor urinary status  -Monitor for worsening s/symptoms of concerns  -Trend labs periodically while on TCU     Essential hypertension   Hyperlipidemia, unspecified hyperlipidemia type   Comment: Chronic. Based on JNC-8 goals,  patients age of 59 year old, presence of diabetes or CKD, and goals of care goal BP is  <140/90 mm Hg. Patient is stable with current plan of care and routine assessment..  Plan:  -Monitor BP and HR as directed  -Continue amlodipine 5mg daily. HOLD if SBP<100  -Continue atorvastatin 40mg daily  -Continue apixaban 5mg BID  -Trend labs periodically while on TCU     Reactive thrombocytosis  Comment: Chronic. Platelets historically in normal range (reviewed last 3 years). Peaked at 700,000, now resolved--Likely  reactive - reviewed  flow chart for acute thrombocytosis, posted in note. Normal peripheral smear, liver enzymes  Plan;  -Monitor bleeding risks  -Monitor for worsening s/symptoms of concerns  -Trend labs periodically while on TCU     Electronically signed by:  Dr. Jayshree Montenegro DNP, APRN, FNP-C, WCS-C, EDS-C     Provider reviewed records from facility, and interpreted most recent imaging/lab work, and vital signs.   Acute and chronic conditions managed by writer. Have been reviewed during today's exam

## 2025-06-04 ENCOUNTER — TRANSITIONAL CARE UNIT VISIT (OUTPATIENT)
Dept: GERIATRICS | Facility: CLINIC | Age: 60
End: 2025-06-04
Payer: COMMERCIAL

## 2025-06-04 VITALS
HEIGHT: 65 IN | TEMPERATURE: 97.9 F | DIASTOLIC BLOOD PRESSURE: 66 MMHG | RESPIRATION RATE: 18 BRPM | HEART RATE: 72 BPM | BODY MASS INDEX: 30.49 KG/M2 | OXYGEN SATURATION: 100 % | SYSTOLIC BLOOD PRESSURE: 125 MMHG | WEIGHT: 183 LBS

## 2025-06-04 DIAGNOSIS — J43.9 PULMONARY EMPHYSEMA, UNSPECIFIED EMPHYSEMA TYPE (H): ICD-10-CM

## 2025-06-04 DIAGNOSIS — A63.0 CONDYLOMA ACUMINATA: ICD-10-CM

## 2025-06-04 DIAGNOSIS — N18.30 TYPE 2 DIABETES MELLITUS WITH STAGE 3 CHRONIC KIDNEY DISEASE, WITH LONG-TERM CURRENT USE OF INSULIN, UNSPECIFIED WHETHER STAGE 3A OR 3B CKD (H): ICD-10-CM

## 2025-06-04 DIAGNOSIS — Z78.9 UNABLE TO CARE FOR SELF: ICD-10-CM

## 2025-06-04 DIAGNOSIS — L02.215 PERINEAL ABSCESS: ICD-10-CM

## 2025-06-04 DIAGNOSIS — T14.8XXA OPEN WOUND: ICD-10-CM

## 2025-06-04 DIAGNOSIS — R53.81 PHYSICAL DECONDITIONING: Primary | ICD-10-CM

## 2025-06-04 DIAGNOSIS — N18.4 CHRONIC KIDNEY DISEASE, STAGE 4 (SEVERE) (H): ICD-10-CM

## 2025-06-04 DIAGNOSIS — I26.99 ACUTE PULMONARY EMBOLISM, UNSPECIFIED PULMONARY EMBOLISM TYPE, UNSPECIFIED WHETHER ACUTE COR PULMONALE PRESENT (H): ICD-10-CM

## 2025-06-04 DIAGNOSIS — Z87.01 HISTORY OF PNEUMONIA: ICD-10-CM

## 2025-06-04 DIAGNOSIS — G47.33 OSA (OBSTRUCTIVE SLEEP APNEA): ICD-10-CM

## 2025-06-04 DIAGNOSIS — I10 ESSENTIAL HYPERTENSION: ICD-10-CM

## 2025-06-04 DIAGNOSIS — K59.01 SLOW TRANSIT CONSTIPATION: ICD-10-CM

## 2025-06-04 DIAGNOSIS — J96.22 ACUTE ON CHRONIC RESPIRATORY FAILURE WITH HYPOXIA AND HYPERCAPNIA (H): ICD-10-CM

## 2025-06-04 DIAGNOSIS — J44.1 CHRONIC OBSTRUCTIVE PULMONARY DISEASE WITH ACUTE EXACERBATION (H): ICD-10-CM

## 2025-06-04 DIAGNOSIS — D75.838 REACTIVE THROMBOCYTOSIS: ICD-10-CM

## 2025-06-04 DIAGNOSIS — E11.9 TYPE 2 DIABETES, HBA1C GOAL < 7% (H): ICD-10-CM

## 2025-06-04 DIAGNOSIS — M62.81 GENERALIZED MUSCLE WEAKNESS: ICD-10-CM

## 2025-06-04 DIAGNOSIS — E08.42 DIABETIC POLYNEUROPATHY ASSOCIATED WITH DIABETES MELLITUS DUE TO UNDERLYING CONDITION (H): ICD-10-CM

## 2025-06-04 DIAGNOSIS — E78.5 HYPERLIPIDEMIA, UNSPECIFIED HYPERLIPIDEMIA TYPE: ICD-10-CM

## 2025-06-04 DIAGNOSIS — F81.9 INTELLECTUAL DELAY: ICD-10-CM

## 2025-06-04 DIAGNOSIS — I26.99 OTHER ACUTE PULMONARY EMBOLISM WITHOUT ACUTE COR PULMONALE (H): ICD-10-CM

## 2025-06-04 DIAGNOSIS — E11.22 TYPE 2 DIABETES MELLITUS WITH STAGE 3 CHRONIC KIDNEY DISEASE, WITH LONG-TERM CURRENT USE OF INSULIN, UNSPECIFIED WHETHER STAGE 3A OR 3B CKD (H): ICD-10-CM

## 2025-06-04 DIAGNOSIS — I48.0 PAF (PAROXYSMAL ATRIAL FIBRILLATION) (H): ICD-10-CM

## 2025-06-04 DIAGNOSIS — N17.9 AKI (ACUTE KIDNEY INJURY): ICD-10-CM

## 2025-06-04 DIAGNOSIS — L21.9 SEBORRHEIC DERMATITIS: ICD-10-CM

## 2025-06-04 DIAGNOSIS — J96.21 ACUTE ON CHRONIC RESPIRATORY FAILURE WITH HYPOXIA AND HYPERCAPNIA (H): ICD-10-CM

## 2025-06-04 DIAGNOSIS — Z79.4 TYPE 2 DIABETES MELLITUS WITH STAGE 3 CHRONIC KIDNEY DISEASE, WITH LONG-TERM CURRENT USE OF INSULIN, UNSPECIFIED WHETHER STAGE 3A OR 3B CKD (H): ICD-10-CM

## 2025-06-04 RX ORDER — KETOCONAZOLE 20 MG/ML
SHAMPOO, SUSPENSION TOPICAL
Qty: 120 ML | Refills: 11 | Status: SHIPPED | OUTPATIENT
Start: 2025-06-04

## 2025-06-04 RX ORDER — TRIAMCINOLONE ACETONIDE 1 MG/G
CREAM TOPICAL 2 TIMES DAILY
Qty: 80 G | Refills: 0 | Status: SHIPPED | OUTPATIENT
Start: 2025-06-04 | End: 2025-06-18

## 2025-06-04 NOTE — LETTER
6/4/2025      Marly Cannon  [address]  [place] [address]        [facility] GERIATRICS    Chief Complaint   Patient presents with     RECHECK     HPI:  Marly Cannon is a 59 year old  (1965), who is being seen today for an episodic care visit at: [facility] ([facility])([facility]) [[id number]]. Today's concern is: The primary encounter diagnosis was Physical deconditioning. Diagnoses of Generalized muscle weakness, Acute on chronic respiratory failure with hypoxia and hypercapnia (H), History of pneumonia, Chronic obstructive pulmonary disease with acute exacerbation (H), ADEEL (obstructive sleep apnea), Perineal abscess, Condyloma acuminata, Acute pulmonary embolism, unspecified pulmonary embolism type, unspecified whether acute cor pulmonale present (H), Pulmonary emphysema, unspecified emphysema type (H), Type 2 diabetes, HbA1c goal < 7% (H), Diabetic polyneuropathy associated with diabetes mellitus due to underlying condition (H), YADIRA (acute kidney injury), Chronic kidney disease, stage 4 (severe) (H), PAF (paroxysmal atrial fibrillation) (H), Essential hypertension, Hyperlipidemia, unspecified hyperlipidemia type, Intellectual delay, Unable to care for self, Seborrheic dermatitis, Reactive thrombocytosis, Open wound, Other acute pulmonary embolism without acute cor pulmonale (H), Type 2 diabetes mellitus with stage 3 chronic kidney disease, with long-term current use of insulin, unspecified whether stage 3a or 3b CKD (H), and Slow transit constipation were also pertinent to this visit.    Met with patient who denies any chest pain, palpitations, shortness of breath, RUBY, lightheadedness, dizziness, or cough. Denies any abdominal discomfort. Denies N&V. Denies B&B concerns. Denies dysuria or frequency. Denies loose or constipation. Appetite good. Sleeping well. Pain stable. New increased redness flare and itch noted to skin and scalp. Will resume topical needs per  "acceptance.     BP Readings from Last 3 Encounters:   06/04/25 125/66   06/02/25 108/72   05/29/25 131/62     Wt Readings from Last 5 Encounters:   06/04/25 83 kg (183 lb)   06/02/25 83 kg (183 lb)   05/29/25 83 kg (183 lb)   05/28/25 83.1 kg (183 lb 3.2 oz)   05/21/25 83 kg (183 lb)     Allergies, and PMH/PSH reviewed in [facility] today.  REVIEW OF SYSTEMS:  Limited secondary to cognitive impairment but today pt reports as note per HPI    Objective:   /66   Pulse 72   Temp 97.9  F (36.6  C)   Resp 18   Ht 1.651 m (5' 5\")   Wt 83 kg (183 lb)   SpO2 100%   BMI 30.45 kg/m    GENERAL APPEARANCE:  Alert, in no distress, cooperative  ENT:  Mouth and posterior oropharynx normal, moist mucous membranes, [place]  EYES:  EOM, conjunctivae, lids, pupils and irises normal  NECK:  No adenopathy,masses or thyromegaly  RESP:  respiratory effort and palpation of chest normal, lungs clear to auscultation , no respiratory distress  CV:  regular rate and rhythm, no murmur, rub, or gallop, no edema, +2 pedal pulses  ABDOMEN:  normal bowel sounds, soft, nontender, no hepatosplenomegaly or other masses, no guarding or rebound  M/S:   Ambulates with walker SBA  SKIN:  Inspection of skin and subcutaneous tissue baseline, wound healing well, no signs of infection improving  NEURO:   Cranial nerves 2-12 are normal tested and grossly at patient's baseline, no purposeful movement in upper and lower extremities  PSYCH:  oriented X 3, memory impaired , affect and mood normal        Most Recent 3 CBC's:  Recent Labs   Lab Test 05/29/25  0840 05/21/25  1152 05/15/25  0850   WBC 9.4 9.3 10.6   HGB 13.2 12.3 12.5   MCV 93 91 94    382 425     Most Recent 3 BMP's:  Recent Labs   Lab Test 05/29/25  0840 05/21/25  1152 05/15/25  0850    134* 137   POTASSIUM 4.6 4.4 4.4   CHLORIDE 101 97* 98   CO2 26 23 25   BUN 34.8* 41.4* 48.6*   CR 1.46* 1.56* 1.25*   ANIONGAP 11 14 14   NORM 10.2 9.7 10.0   * 218* 164*     Most Recent 2 " LFT's:  Recent Labs   Lab Test 05/29/25  0840 05/15/25  0850   AST 21 24   ALT 15 11   ALKPHOS 88 96   BILITOTAL 0.6 0.6     Most Recent Hemoglobin A1c:  Recent Labs   Lab Test 03/22/25  1731   A1C 12.7*     Most Recent Anemia Panel:  Recent Labs   Lab Test 05/29/25  0840 05/21/25  1152 05/15/25  0850 04/23/25  0623 04/21/25  0814 04/07/25  0542 04/06/25  0431 04/05/25  0451 04/19/23  1510 01/17/23  1505   WBC 9.4   < > 10.6   < > 9.7   < > 7.3 7.7   < > 10.1   HGB 13.2   < > 12.5   < > 11.2*   < > 10.1* 9.9*   < > 17.1*   HCT 41.7   < > 39.2   < > 34.8*   < > 31.2* 32.5*   < > 50.1*   MCV 93   < > 94   < > 96   < > 94 96   < > 88      < > 425   < > 650*   < > 282 292   < > 263   IRON  --   --   --   --   --   --   --  51  --   --    IRONSAT  --   --   --   --   --   --   --  27  --   --    RETICABSCT  --   --   --   --  0.116*  --   --   --   --   --    RETP  --   --   --   --  3.2*  --   --   --   --   --    FEB  --   --   --   --   --   --   --  188*  --   --    OLIVA  --   --   --   --   --   --   --  407*  --   --    B12  --   --  559  --   --   --   --  859   < >  --    FOLIC  --   --   --   --   --   --  4.9  --   --   --    EPOE  --   --   --   --   --   --   --   --   --  7    < > = values in this interval not displayed.       ASSESSMENT/PLAN:  Physical deconditioning  Generalized muscle weakness  Unable to care for self  Intellectual delay  Comment: Chronic. S/T prolonged hospitalization. Per therapy- Patient requires set up for UB needs, Supv for LB needs. Ambulates up to 150ft 2WW CGA. CPT 4.7/5.6  Plan:  -Continue Physical therapy and Occupational therapy as directed--projected LCD 6/12  -SW to remain involved for safe discharge planning needs  -Recommend LTC placement post TCU in [facility] where she wishes to be near family  -Staff to start [facility] program for medications/insulin for education to assist with discharge potential needs     Acute on Chronic Hypoxic Respiratory Failure  History of  VAP  COPD  ADEEL  PE, Acute, segmental, subsegmental  Pulmonary emphysema  Comment: Acute on chronic. Admitted to [facility] on 3/22/25: She had a repeat CTA that demonstrated progression of her PE and RLL collapse concerning for mucous plugging and pneumonia. Primary team has been having issues w/ progressively increasing FiO2 needs. She had a sputum culture grow Candida & has been on fluconazole since 3/25. Transferred to [facility] d/t concerns that she might require a tracheostomy secondary to her inability to liberate from the vent. At [facility] patient was admitted to the ICU on a ventilator, successfully extubated 4/1 and downgraded to floor status 4/3. Infectious disease consulted and continued patient on Zosyn course to be completed through 4/17/2025. Patient was able to liberate from ventilator withOUT tracheostomy formation.   Plan:  -Monitor respiratory status  -Oxygen 3L via oxymask overnights.   -Encourage incentive spirometry every 4 hours while awake  -Continue apixaban 5mg BID  -Continue albuterol nebulization every 2 hour PRN  -Continue umeclidinium-vilanterol (anoro ellipta) 62.5-25mcg inhaler daily  -Trend labs periodically while on TCU     Perineal Abscess  Condyloma Acuminata  Comment: Acute on chronic. Per recent hospitalization-General surgery consulted for right keiry/perineal abscess and performed incision and drainage with Penrose drain placement 3/31 with subsequent repeat I&D 4/2 and 4/8. s/p acyclovir and I&D x3. Noted on 4/28/25-5/4/25: Wound vac remains--penrose drain removed--packing to tract along/under R labia. Landrum catheter successfully removed.   Plan:  -Monitor for worsening s/symptoms of concerns  -Air mattress while on site  -Follow up with wound clinic as directed. Scheduled for 6/10/25  -Monitor pain complaints  -Continue Tylenol PRN  -Trend labs periodically while on TCU  -Continue wound care as directed per vascular  *Change every other day and  PRN  Cleanse your right groin wound(s) with Dilute hibiclens 30cc in 500cc NS.  Pat Dry with non-sterile gauze  Apply small amout of clotrimazole cream to the periwound rash use 1/2 pea sized amount  Primary Dressing: Apply silvercel or aquacel ag into/onto the wounds  Secondary dressing: Cover with bordered foam; such as 4x8 mepilex border  It is ok to get your wound wet in the bath or shower     Seborrheic dermatitis:   Comment: Acute on chronic. Noted to face PTA--see photo above  Plan:  -Monitor for worsening s/symptoms of concerns  -Dermatology PRN if symptoms worsen  -Restart triamcinolone cream to face BID x 2 weeks  -Start ketoconazole 2% shampoo- apply to scalp and body during showers     T2DM  Poorly controlled as outpatient with A1c 12.7 in march 2025. Used to be on insulin pump. No longer on it due to intellectual delay.  Plan:  -Continue Blood Glucose down to BID using Freestyle Bud 2 device. Change device every 14 days and PRN. Update provider if Blood Glucose<70 and.or >450  -Continue jardiance 25mg daily  -Monitor hgb A1c every 3 months. Due June 2025  -Continue insulin glargine 38 units at HS. HOLD if Blood Glucose<120  -Trend labs periodically while on TCU         CKD stage 4  YADIRA  Comment: Chronic. Baseline creatinine~1.1-1.3  Plan:  -Monitor urinary status  -Monitor for worsening s/symptoms of concerns  -Trend labs periodically while on TCU     Essential hypertension   Hyperlipidemia, unspecified hyperlipidemia type   Comment: Chronic. Based on JNC-8 goals,  patients age of 59 year old, presence of diabetes or CKD, and goals of care goal BP is  <140/90 mm Hg. Patient is stable with current plan of care and routine assessment..  Plan:  -Monitor BP and HR as directed  -Continue amlodipine 5mg daily. HOLD if SBP<100  -Continue atorvastatin 40mg daily  -Continue apixaban 5mg BID  -Trend labs periodically while on TCU     Reactive thrombocytosis  Comment: Chronic. Platelets historically in normal  range (reviewed last 3 years). Peaked at 700,000, now resolved--Likely  reactive - reviewed flow chart for acute thrombocytosis, posted in note. Normal peripheral smear, liver enzymes  Plan;  -Monitor bleeding risks  -Monitor for worsening s/symptoms of concerns  -Trend labs periodically while on TCU     Electronically signed by:  Dr. Jayshree Montenegro DNP, APRN, FNP-C, WCS-C, EDS-C     Provider reviewed records from facility, and interpreted most recent imaging/lab work, and vital signs.   Acute and chronic conditions managed by writer. Have been reviewed during today's exam      Sincerely,        Jayshree Montenegro, BLAIR CNP    Electronically signed

## 2025-06-08 NOTE — PROGRESS NOTES
"[facility] GERIATRICS    Chief Complaint   Patient presents with    RECHECK     HPI:  Marly Cannon is a 59 year old  (1965), who is being seen today for an episodic care visit at: [facility]) [[id number]]. Today's concern is: The primary encounter diagnosis was Physical deconditioning. Diagnoses of Generalized muscle weakness, Acute on chronic respiratory failure with hypoxia and hypercapnia (H), History of pneumonia, Chronic obstructive pulmonary disease with acute exacerbation (H), ADEEL (obstructive sleep apnea), Perineal abscess, Condyloma acuminata, Acute pulmonary embolism, unspecified pulmonary embolism type, unspecified whether acute cor pulmonale present (H), Pulmonary emphysema, unspecified emphysema type (H), Type 2 diabetes, HbA1c goal < 7% (H), Diabetic polyneuropathy associated with diabetes mellitus due to underlying condition (H), YADIRA (acute kidney injury), Chronic kidney disease, stage 4 (severe) (H), PAF (paroxysmal atrial fibrillation) (H), Essential hypertension, Hyperlipidemia, unspecified hyperlipidemia type, Intellectual delay, Unable to care for self, Seborrheic dermatitis, Reactive thrombocytosis, Open wound, Type 2 diabetes mellitus with stage 3 chronic kidney disease, with long-term current use of insulin, unspecified whether stage 3a or 3b CKD (H), Other acute pulmonary embolism without acute cor pulmonale (H), and Slow transit constipation were also pertinent to this visit.    Met with patient who was found lying in bed. She reports \"I hear I can go home on Friday.\" Reminded her that therapy is projected to stop end of week, however due to her inability to perform insulin administration and/or monitor her Blood Glucose values appropriately, it will be best she discharges to a facility that can assist with this. SW updated. Per nursing staff, she also lacks initiative on insulin management.     She denies any chest pain, palpitations, " "shortness of breath, RUBY, lightheadedness, dizziness, or cough. Denies any abdominal discomfort. Denies N&V. Denies B&B concerns. Denies dysuria or frequency. Denies loose or constipation. Appetite good. Sleeping well. Compliant with oxygen at HS. Denies any pain complaints.     BP Readings from Last 3 Encounters:   06/09/25 138/75   06/04/25 125/66   06/02/25 108/72     Wt Readings from Last 5 Encounters:   06/09/25 83.1 kg (183 lb 3.2 oz)   06/04/25 83 kg (183 lb)   06/02/25 83 kg (183 lb)   05/29/25 83 kg (183 lb)   05/28/25 83.1 kg (183 lb 3.2 oz)     Allergies, and PMH/PSH reviewed in EPIC today.  REVIEW OF SYSTEMS:  Limited secondary to cognitive impairment but today pt reports as noted per HPI    Objective:   /75   Pulse 72   Temp 98.6  F (37  C)   Resp 18   Ht 1.651 m (5' 5\")   Wt 83.1 kg (183 lb 3.2 oz)   SpO2 100%   BMI 30.49 kg/m    GENERAL APPEARANCE:  Alert, in no distress, cooperative  ENT:  Mouth and posterior oropharynx normal, moist mucous membranes, [place]  EYES:  EOM, conjunctivae, lids, pupils and irises normal  NECK:  No adenopathy,masses or thyromegaly  RESP:  respiratory effort and palpation of chest normal, lungs clear to auscultation , no respiratory distress  CV:  regular rate and rhythm, no murmur, rub, or gallop, no edema, +2 pedal pulses  ABDOMEN:  normal bowel sounds, soft, nontender, no hepatosplenomegaly or other masses, no guarding or rebound  M/S:   Ambulates with walker and SBA.   SKIN:  Inspection of skin and subcutaneous tissue baseline, wound healing well, no signs of infection improving well  NEURO:   Cranial nerves 2-12 are normal tested and grossly at patient's baseline, no purposeful movement in upper and lower extremities  PSYCH:  oriented X 3, insight and judgement impaired, memory impaired , affect and mood normal    Most Recent 3 CBC's:  Recent Labs   Lab Test 05/29/25  0840 05/21/25  1152 05/15/25  0850   WBC 9.4 9.3 10.6   HGB 13.2 12.3 12.5   MCV 93 91 94 " "   382 425     Most Recent 3 BMP's:  Recent Labs   Lab Test 05/29/25  0840 05/21/25  1152 05/15/25  0850    134* 137   POTASSIUM 4.6 4.4 4.4   CHLORIDE 101 97* 98   CO2 26 23 25   BUN 34.8* 41.4* 48.6*   CR 1.46* 1.56* 1.25*   ANIONGAP 11 14 14   NORM 10.2 9.7 10.0   * 218* 164*     Most Recent 2 LFT's:  Recent Labs   Lab Test 05/29/25  0840 05/15/25  0850   AST 21 24   ALT 15 11   ALKPHOS 88 96   BILITOTAL 0.6 0.6     Most Recent Hemoglobin A1c:  Recent Labs   Lab Test 03/22/25  1731   A1C 12.7*     Most Recent Anemia Panel:  Recent Labs   Lab Test 05/29/25  0840 05/21/25  1152 05/15/25  0850 04/23/25  0623 04/21/25  0814 04/07/25  0542 04/06/25  0431 04/05/25  0451 04/19/23  1510 01/17/23  1505   WBC 9.4   < > 10.6   < > 9.7   < > 7.3 7.7   < > 10.1   HGB 13.2   < > 12.5   < > 11.2*   < > 10.1* 9.9*   < > 17.1*   HCT 41.7   < > 39.2   < > 34.8*   < > 31.2* 32.5*   < > 50.1*   MCV 93   < > 94   < > 96   < > 94 96   < > 88      < > 425   < > 650*   < > 282 292   < > 263   IRON  --   --   --   --   --   --   --  51  --   --    IRONSAT  --   --   --   --   --   --   --  27  --   --    RETICABSCT  --   --   --   --  0.116*  --   --   --   --   --    RETP  --   --   --   --  3.2*  --   --   --   --   --    FEB  --   --   --   --   --   --   --  188*  --   --    OLIVA  --   --   --   --   --   --   --  407*  --   --    B12  --   --  559  --   --   --   --  859   < >  --    FOLIC  --   --   --   --   --   --  4.9  --   --   --    EPOE  --   --   --   --   --   --   --   --   --  7    < > = values in this interval not displayed.       ASSESSMENT/PLAN:  Physical deconditioning  Generalized muscle weakness  Unable to care for self  Intellectual delay  Comment: Chronic. S/T prolonged hospitalization. Per therapy- Patient requires set up for UB needs, Supv for LB needs. Ambulates up to 150ft 2WW CGA. CPT 4.7/5.6. She reports \"I hear I can go home on Friday.\" Reminded her that therapy is projected to " stop end of week, however due to her inability to perform insulin administration and/or monitor her Blood Glucose values appropriately, it will be best she discharges to a facility that can assist with this. SW updated. Per nursing staff, she also lacks initiative on insulin management.   Plan:  -Continue Physical therapy and Occupational therapy as directed--projected LCD 6/12  -SW to remain involved for safe discharge planning needs  -Recommend LTC/USP placement post TCU in [facility] where she wishes to be near family     Acute on Chronic Hypoxic Respiratory Failure  History of VAP  COPD  ADEEL  PE, Acute, segmental, subsegmental  Pulmonary emphysema  Comment: Acute on chronic. Admitted to [facility] on 3/22/25: She had a repeat CTA that demonstrated progression of her PE and RLL collapse concerning for mucous plugging and pneumonia. Primary team has been having issues w/ progressively increasing FiO2 needs. She had a sputum culture grow Candida & has been on fluconazole since 3/25. Transferred to [facility] d/t concerns that she might require a tracheostomy secondary to her inability to liberate from the vent. At [facility] patient was admitted to the ICU on a ventilator, successfully extubated 4/1 and downgraded to floor status 4/3. Infectious disease consulted and continued patient on Zosyn course to be completed through 4/17/2025. Patient was able to liberate from ventilator withOUT tracheostomy formation.   Plan:  -Monitor respiratory status  -Oxygen 3L via oxymask overnights.   -Encourage incentive spirometry every 4 hours while awake  -Continue apixaban 5mg BID  -Continue albuterol nebulization every 2 hour PRN  -Continue umeclidinium-vilanterol (anoro ellipta) 62.5-25mcg inhaler daily  -BMP, CBC and hgb A1c due 6/11/25     Perineal Abscess  Condyloma Acuminata  Comment: Acute on chronic. Per recent hospitalization-General surgery consulted for right keiry/perineal abscess and performed  incision and drainage with Penrose drain placement 3/31 with subsequent repeat I&D 4/2 and 4/8. s/p acyclovir and I&D x3. Noted on 4/28/25-5/4/25: Wound vac remains--penrose drain removed--packing to tract along/under R labia. Landrum catheter successfully removed.   Plan:  -Monitor for worsening s/symptoms of concerns  -Air mattress while on site  -Follow up with wound clinic as directed. Scheduled for 6/10/25  -Monitor pain complaints  -Continue Tylenol PRN  -BMP, CBC and hgb A1c due 6/11/25  -Continue wound care as directed per vascular  *Change every other day and PRN  Cleanse your right groin wound(s) with Dilute hibiclens 30cc in 500cc NS.  Pat Dry with non-sterile gauze  Apply small amout of clotrimazole cream to the periwound rash use 1/2 pea sized amount  Primary Dressing: Apply silvercel or aquacel ag into/onto the wounds  Secondary dressing: Cover with bordered foam; such as 4x8 mepilex border  It is ok to get your wound wet in the bath or shower     Seborrheic dermatitis:   Comment: Acute on chronic. Slowly improving to face with regimen  Plan:  -Monitor for worsening s/symptoms of concerns  -Dermatology PRN if symptoms worsen  -Continue triamcinolone cream to face BID x 2 weeks  -Continue ketoconazole 2% shampoo- apply to scalp and body during showers     T2DM  Comment: Chronic. Poorly controlled as outpatient with A1c 12.7 in march 2025. Used to be on insulin pump. No longer on it due to intellectual delay. Not able to complete own Blood Glucose monitoring along with insulin administration needs despite education  Plan:  -Continue Blood Glucose BID using Freestyle Bud 2 device. Change device every 14 days and PRN. Update provider if Blood Glucose<70 and.or >450  -Continue jardiance 25mg daily  -BMP, CBC and hgb A1c due 6/11/25  -Continue insulin glargine 38 units at HS. HOLD if Blood Glucose<120          CKD stage 4  YADIRA  Comment: Chronic. Baseline creatinine~1.1-1.3  Plan:  -Monitor urinary  status  -Monitor for worsening s/symptoms of concerns  -BMP, CBC and hgb A1c due 6/11/25     Essential hypertension   Hyperlipidemia, unspecified hyperlipidemia type   Comment: Chronic. Based on JNC-8 goals,  patients age of 59 year old, presence of diabetes or CKD, and goals of care goal BP is  <140/90 mm Hg. Patient is stable with current plan of care and routine assessment..  Plan:  -Monitor BP and HR as directed  -Continue amlodipine 5mg daily. HOLD if SBP<100  -Continue atorvastatin 40mg daily  -Continue apixaban 5mg BID  -BMP, CBC and hgb A1c due 6/11/25     Reactive thrombocytosis  Comment: Chronic. Platelets historically in normal range (reviewed last 3 years). Peaked at 700,000, now resolved--Likely  reactive - reviewed flow chart for acute thrombocytosis, posted in note. Normal peripheral smear, liver enzymes  Plan;  -Monitor bleeding risks  -Monitor for worsening s/symptoms of concerns  -BMP, CBC and hgb A1c due 6/11/25     Electronically signed by:  Dr. Jayshree Montenegro DNP, APRN, FNP-C, WCS-C, EDS-C     Provider reviewed records from facility, and interpreted most recent imaging/lab work, and vital signs.   Acute and chronic conditions managed by writer. Have been reviewed during today's exam

## 2025-06-09 ENCOUNTER — TRANSITIONAL CARE UNIT VISIT (OUTPATIENT)
Dept: GERIATRICS | Facility: CLINIC | Age: 60
End: 2025-06-09
Payer: COMMERCIAL

## 2025-06-09 VITALS
RESPIRATION RATE: 18 BRPM | HEART RATE: 72 BPM | TEMPERATURE: 98.6 F | HEIGHT: 65 IN | WEIGHT: 183.2 LBS | BODY MASS INDEX: 30.52 KG/M2 | DIASTOLIC BLOOD PRESSURE: 75 MMHG | SYSTOLIC BLOOD PRESSURE: 138 MMHG | OXYGEN SATURATION: 100 %

## 2025-06-09 DIAGNOSIS — I26.99 OTHER ACUTE PULMONARY EMBOLISM WITHOUT ACUTE COR PULMONALE (H): ICD-10-CM

## 2025-06-09 DIAGNOSIS — G47.33 OSA (OBSTRUCTIVE SLEEP APNEA): ICD-10-CM

## 2025-06-09 DIAGNOSIS — N18.30 TYPE 2 DIABETES MELLITUS WITH STAGE 3 CHRONIC KIDNEY DISEASE, WITH LONG-TERM CURRENT USE OF INSULIN, UNSPECIFIED WHETHER STAGE 3A OR 3B CKD (H): ICD-10-CM

## 2025-06-09 DIAGNOSIS — Z78.9 UNABLE TO CARE FOR SELF: ICD-10-CM

## 2025-06-09 DIAGNOSIS — N18.4 CHRONIC KIDNEY DISEASE, STAGE 4 (SEVERE) (H): ICD-10-CM

## 2025-06-09 DIAGNOSIS — E11.22 TYPE 2 DIABETES MELLITUS WITH STAGE 3 CHRONIC KIDNEY DISEASE, WITH LONG-TERM CURRENT USE OF INSULIN, UNSPECIFIED WHETHER STAGE 3A OR 3B CKD (H): ICD-10-CM

## 2025-06-09 DIAGNOSIS — Z79.4 TYPE 2 DIABETES MELLITUS WITH STAGE 3 CHRONIC KIDNEY DISEASE, WITH LONG-TERM CURRENT USE OF INSULIN, UNSPECIFIED WHETHER STAGE 3A OR 3B CKD (H): ICD-10-CM

## 2025-06-09 DIAGNOSIS — D75.838 REACTIVE THROMBOCYTOSIS: ICD-10-CM

## 2025-06-09 DIAGNOSIS — A63.0 CONDYLOMA ACUMINATA: ICD-10-CM

## 2025-06-09 DIAGNOSIS — N17.9 AKI (ACUTE KIDNEY INJURY): ICD-10-CM

## 2025-06-09 DIAGNOSIS — M62.81 GENERALIZED MUSCLE WEAKNESS: ICD-10-CM

## 2025-06-09 DIAGNOSIS — L21.9 SEBORRHEIC DERMATITIS: ICD-10-CM

## 2025-06-09 DIAGNOSIS — R53.81 PHYSICAL DECONDITIONING: Primary | ICD-10-CM

## 2025-06-09 DIAGNOSIS — L02.215 PERINEAL ABSCESS: ICD-10-CM

## 2025-06-09 DIAGNOSIS — F81.9 INTELLECTUAL DELAY: ICD-10-CM

## 2025-06-09 DIAGNOSIS — E78.5 HYPERLIPIDEMIA, UNSPECIFIED HYPERLIPIDEMIA TYPE: ICD-10-CM

## 2025-06-09 DIAGNOSIS — T14.8XXA OPEN WOUND: ICD-10-CM

## 2025-06-09 DIAGNOSIS — E08.42 DIABETIC POLYNEUROPATHY ASSOCIATED WITH DIABETES MELLITUS DUE TO UNDERLYING CONDITION (H): ICD-10-CM

## 2025-06-09 DIAGNOSIS — K59.01 SLOW TRANSIT CONSTIPATION: ICD-10-CM

## 2025-06-09 DIAGNOSIS — J44.1 CHRONIC OBSTRUCTIVE PULMONARY DISEASE WITH ACUTE EXACERBATION (H): ICD-10-CM

## 2025-06-09 DIAGNOSIS — I26.99 ACUTE PULMONARY EMBOLISM, UNSPECIFIED PULMONARY EMBOLISM TYPE, UNSPECIFIED WHETHER ACUTE COR PULMONALE PRESENT (H): ICD-10-CM

## 2025-06-09 DIAGNOSIS — J43.9 PULMONARY EMPHYSEMA, UNSPECIFIED EMPHYSEMA TYPE (H): ICD-10-CM

## 2025-06-09 DIAGNOSIS — J96.22 ACUTE ON CHRONIC RESPIRATORY FAILURE WITH HYPOXIA AND HYPERCAPNIA (H): ICD-10-CM

## 2025-06-09 DIAGNOSIS — E11.9 TYPE 2 DIABETES, HBA1C GOAL < 7% (H): ICD-10-CM

## 2025-06-09 DIAGNOSIS — Z87.01 HISTORY OF PNEUMONIA: ICD-10-CM

## 2025-06-09 DIAGNOSIS — J96.21 ACUTE ON CHRONIC RESPIRATORY FAILURE WITH HYPOXIA AND HYPERCAPNIA (H): ICD-10-CM

## 2025-06-09 DIAGNOSIS — I48.0 PAF (PAROXYSMAL ATRIAL FIBRILLATION) (H): ICD-10-CM

## 2025-06-09 DIAGNOSIS — I10 ESSENTIAL HYPERTENSION: ICD-10-CM

## 2025-06-09 PROCEDURE — 99309 SBSQ NF CARE MODERATE MDM 30: CPT | Performed by: NURSE PRACTITIONER

## 2025-06-09 NOTE — LETTER
" 6/9/2025      Marly Cannon  [address]  [place] [address]        [facility] GERIATRICS    Chief Complaint   Patient presents with     RECHECK     HPI:  Marly Cannon is a 59 year old  (1965), who is being seen today for an episodic care visit at: [facility] ([facility]) [[id number]]. Today's concern is: The primary encounter diagnosis was Physical deconditioning. Diagnoses of Generalized muscle weakness, Acute on chronic respiratory failure with hypoxia and hypercapnia (H), History of pneumonia, Chronic obstructive pulmonary disease with acute exacerbation (H), ADEEL (obstructive sleep apnea), Perineal abscess, Condyloma acuminata, Acute pulmonary embolism, unspecified pulmonary embolism type, unspecified whether acute cor pulmonale present (H), Pulmonary emphysema, unspecified emphysema type (H), Type 2 diabetes, HbA1c goal < 7% (H), Diabetic polyneuropathy associated with diabetes mellitus due to underlying condition (H), YADIRA (acute kidney injury), Chronic kidney disease, stage 4 (severe) (H), PAF (paroxysmal atrial fibrillation) (H), Essential hypertension, Hyperlipidemia, unspecified hyperlipidemia type, Intellectual delay, Unable to care for self, Seborrheic dermatitis, Reactive thrombocytosis, Open wound, Type 2 diabetes mellitus with stage 3 chronic kidney disease, with long-term current use of insulin, unspecified whether stage 3a or 3b CKD (H), Other acute pulmonary embolism without acute cor pulmonale (H), and Slow transit constipation were also pertinent to this visit.    Met with patient who was found lying in bed. She reports \"I hear I can go home on Friday.\" Reminded her that therapy is projected to stop end of week, however due to her inability to perform insulin administration and/or monitor her Blood Glucose values appropriately, it will be best she discharges to a facility that can assist with this. SW updated. Per nursing staff, she also lacks " "initiative on insulin management.     She denies any chest pain, palpitations, shortness of breath, RUBY, lightheadedness, dizziness, or cough. Denies any abdominal discomfort. Denies N&V. Denies B&B concerns. Denies dysuria or frequency. Denies loose or constipation. Appetite good. Sleeping well. Compliant with oxygen at HS. Denies any pain complaints.     BP Readings from Last 3 Encounters:   06/09/25 138/75   06/04/25 125/66   06/02/25 108/72     Wt Readings from Last 5 Encounters:   06/09/25 83.1 kg (183 lb 3.2 oz)   06/04/25 83 kg (183 lb)   06/02/25 83 kg (183 lb)   05/29/25 83 kg (183 lb)   05/28/25 83.1 kg (183 lb 3.2 oz)     Allergies, and PMH/PSH reviewed in EPIC today.  REVIEW OF SYSTEMS:  Limited secondary to cognitive impairment but today pt reports as noted per HPI    Objective:   /75   Pulse 72   Temp 98.6  F (37  C)   Resp 18   Ht 1.651 m (5' 5\")   Wt 83.1 kg (183 lb 3.2 oz)   SpO2 100%   BMI 30.49 kg/m    GENERAL APPEARANCE:  Alert, in no distress, cooperative  ENT:  Mouth and posterior oropharynx normal, moist mucous membranes, [place]  EYES:  EOM, conjunctivae, lids, pupils and irises normal  NECK:  No adenopathy,masses or thyromegaly  RESP:  respiratory effort and palpation of chest normal, lungs clear to auscultation , no respiratory distress  CV:  regular rate and rhythm, no murmur, rub, or gallop, no edema, +2 pedal pulses  ABDOMEN:  normal bowel sounds, soft, nontender, no hepatosplenomegaly or other masses, no guarding or rebound  M/S:   Ambulates with walker and SBA.   SKIN:  Inspection of skin and subcutaneous tissue baseline, wound healing well, no signs of infection improving well  NEURO:   Cranial nerves 2-12 are normal tested and grossly at patient's baseline, no purposeful movement in upper and lower extremities  PSYCH:  oriented X 3, insight and judgement impaired, memory impaired , affect and mood normal    Most Recent 3 CBC's:  Recent Labs   Lab Test 05/29/25  0840 " 05/21/25  1152 05/15/25  0850   WBC 9.4 9.3 10.6   HGB 13.2 12.3 12.5   MCV 93 91 94    382 425     Most Recent 3 BMP's:  Recent Labs   Lab Test 05/29/25  0840 05/21/25  1152 05/15/25  0850    134* 137   POTASSIUM 4.6 4.4 4.4   CHLORIDE 101 97* 98   CO2 26 23 25   BUN 34.8* 41.4* 48.6*   CR 1.46* 1.56* 1.25*   ANIONGAP 11 14 14   NORM 10.2 9.7 10.0   * 218* 164*     Most Recent 2 LFT's:  Recent Labs   Lab Test 05/29/25  0840 05/15/25  0850   AST 21 24   ALT 15 11   ALKPHOS 88 96   BILITOTAL 0.6 0.6     Most Recent Hemoglobin A1c:  Recent Labs   Lab Test 03/22/25  1731   A1C 12.7*     Most Recent Anemia Panel:  Recent Labs   Lab Test 05/29/25  0840 05/21/25  1152 05/15/25  0850 04/23/25  0623 04/21/25  0814 04/07/25  0542 04/06/25  0431 04/05/25  0451 04/19/23  1510 01/17/23  1505   WBC 9.4   < > 10.6   < > 9.7   < > 7.3 7.7   < > 10.1   HGB 13.2   < > 12.5   < > 11.2*   < > 10.1* 9.9*   < > 17.1*   HCT 41.7   < > 39.2   < > 34.8*   < > 31.2* 32.5*   < > 50.1*   MCV 93   < > 94   < > 96   < > 94 96   < > 88      < > 425   < > 650*   < > 282 292   < > 263   IRON  --   --   --   --   --   --   --  51  --   --    IRONSAT  --   --   --   --   --   --   --  27  --   --    RETICABSCT  --   --   --   --  0.116*  --   --   --   --   --    RETP  --   --   --   --  3.2*  --   --   --   --   --    FEB  --   --   --   --   --   --   --  188*  --   --    OLIVA  --   --   --   --   --   --   --  407*  --   --    B12  --   --  559  --   --   --   --  859   < >  --    FOLIC  --   --   --   --   --   --  4.9  --   --   --    EPOE  --   --   --   --   --   --   --   --   --  7    < > = values in this interval not displayed.       ASSESSMENT/PLAN:  Physical deconditioning  Generalized muscle weakness  Unable to care for self  Intellectual delay  Comment: Chronic. S/T prolonged hospitalization. Per therapy- Patient requires set up for UB needs, Supv for LB needs. Ambulates up to 150ft 2WW CGA. CPT 4.7/5.6. She  "reports \"I hear I can go home on Friday.\" Reminded her that therapy is projected to stop end of week, however due to her inability to perform insulin administration and/or monitor her Blood Glucose values appropriately, it will be best she discharges to a facility that can assist with this. SW updated. Per nursing staff, she also lacks initiative on insulin management.   Plan:  -Continue Physical therapy and Occupational therapy as directed--projected LCD 6/12  -SW to remain involved for safe discharge planning needs  -Recommend LTC/longterm placement post TCU in [facility] where she wishes to be near family     Acute on Chronic Hypoxic Respiratory Failure  History of VAP  COPD  ADEEL  PE, Acute, segmental, subsegmental  Pulmonary emphysema  Comment: Acute on chronic. Admitted to [facility] on 3/22/25: She had a repeat CTA that demonstrated progression of her PE and RLL collapse concerning for mucous plugging and pneumonia. Primary team has been having issues w/ progressively increasing FiO2 needs. She had a sputum culture grow Candida & has been on fluconazole since 3/25. Transferred to [facility] d/t concerns that she might require a tracheostomy secondary to her inability to liberate from the vent. At [facility] patient was admitted to the ICU on a ventilator, successfully extubated 4/1 and downgraded to floor status 4/3. Infectious disease consulted and continued patient on Zosyn course to be completed through 4/17/2025. Patient was able to liberate from ventilator withOUT tracheostomy formation.   Plan:  -Monitor respiratory status  -Oxygen 3L via oxymask overnights.   -Encourage incentive spirometry every 4 hours while awake  -Continue apixaban 5mg BID  -Continue albuterol nebulization every 2 hour PRN  -Continue umeclidinium-vilanterol (anoro ellipta) 62.5-25mcg inhaler daily  -BMP, CBC and hgb A1c due 6/11/25     Perineal Abscess  Condyloma Acuminata  Comment: Acute on chronic. Per recent " hospitalization-General surgery consulted for right keiry/perineal abscess and performed incision and drainage with Penrose drain placement 3/31 with subsequent repeat I&D 4/2 and 4/8. s/p acyclovir and I&D x3. Noted on 4/28/25-5/4/25: Wound vac remains--penrose drain removed--packing to tract along/under R labia. Landrum catheter successfully removed.   Plan:  -Monitor for worsening s/symptoms of concerns  -Air mattress while on site  -Follow up with wound clinic as directed. Scheduled for 6/10/25  -Monitor pain complaints  -Continue Tylenol PRN  -BMP, CBC and hgb A1c due 6/11/25  -Continue wound care as directed per vascular  *Change every other day and PRN  Cleanse your right groin wound(s) with Dilute hibiclens 30cc in 500cc NS.  Pat Dry with non-sterile gauze  Apply small amout of clotrimazole cream to the periwound rash use 1/2 pea sized amount  Primary Dressing: Apply silvercel or aquacel ag into/onto the wounds  Secondary dressing: Cover with bordered foam; such as 4x8 mepilex border  It is ok to get your wound wet in the bath or shower     Seborrheic dermatitis:   Comment: Acute on chronic. Slowly improving to face with regimen  Plan:  -Monitor for worsening s/symptoms of concerns  -Dermatology PRN if symptoms worsen  -Continue triamcinolone cream to face BID x 2 weeks  -Continue ketoconazole 2% shampoo- apply to scalp and body during showers     T2DM  Comment: Chronic. Poorly controlled as outpatient with A1c 12.7 in march 2025. Used to be on insulin pump. No longer on it due to intellectual delay. Not able to complete own Blood Glucose monitoring along with insulin administration needs despite education  Plan:  -Continue Blood Glucose BID using Freestyle Bud 2 device. Change device every 14 days and PRN. Update provider if Blood Glucose<70 and.or >450  -Continue jardiance 25mg daily  -BMP, CBC and hgb A1c due 6/11/25  -Continue insulin glargine 38 units at HS. HOLD if Blood Glucose<120          CKD stage  4  YADIRA  Comment: Chronic. Baseline creatinine~1.1-1.3  Plan:  -Monitor urinary status  -Monitor for worsening s/symptoms of concerns  -BMP, CBC and hgb A1c due 6/11/25     Essential hypertension   Hyperlipidemia, unspecified hyperlipidemia type   Comment: Chronic. Based on JNC-8 goals,  patients age of 59 year old, presence of diabetes or CKD, and goals of care goal BP is  <140/90 mm Hg. Patient is stable with current plan of care and routine assessment..  Plan:  -Monitor BP and HR as directed  -Continue amlodipine 5mg daily. HOLD if SBP<100  -Continue atorvastatin 40mg daily  -Continue apixaban 5mg BID  -BMP, CBC and hgb A1c due 6/11/25     Reactive thrombocytosis  Comment: Chronic. Platelets historically in normal range (reviewed last 3 years). Peaked at 700,000, now resolved--Likely  reactive - reviewed flow chart for acute thrombocytosis, posted in note. Normal peripheral smear, liver enzymes  Plan;  -Monitor bleeding risks  -Monitor for worsening s/symptoms of concerns  -BMP, CBC and hgb A1c due 6/11/25     Electronically signed by:  Dr. Jayshree Montenegro DNP, APRN, FNP-C, WCS-C, EDS-C     Provider reviewed records from facility, and interpreted most recent imaging/lab work, and vital signs.   Acute and chronic conditions managed by writer. Have been reviewed during today's exam      Sincerely,        Jayshree Montenegro, BLAIR CNP    Electronically signed

## 2025-06-10 ENCOUNTER — OFFICE VISIT (OUTPATIENT)
Dept: VASCULAR SURGERY | Facility: CLINIC | Age: 60
End: 2025-06-10
Attending: NURSE PRACTITIONER
Payer: COMMERCIAL

## 2025-06-10 VITALS
OXYGEN SATURATION: 95 % | TEMPERATURE: 98.1 F | DIASTOLIC BLOOD PRESSURE: 77 MMHG | HEART RATE: 98 BPM | SYSTOLIC BLOOD PRESSURE: 112 MMHG

## 2025-06-10 VITALS
BODY MASS INDEX: 30.52 KG/M2 | HEART RATE: 90 BPM | SYSTOLIC BLOOD PRESSURE: 107 MMHG | TEMPERATURE: 97.9 F | RESPIRATION RATE: 18 BRPM | HEIGHT: 65 IN | OXYGEN SATURATION: 95 % | DIASTOLIC BLOOD PRESSURE: 68 MMHG | WEIGHT: 183.2 LBS

## 2025-06-10 DIAGNOSIS — E08.42 DIABETIC POLYNEUROPATHY ASSOCIATED WITH DIABETES MELLITUS DUE TO UNDERLYING CONDITION (H): ICD-10-CM

## 2025-06-10 DIAGNOSIS — L02.214 GROIN ABSCESS: Primary | ICD-10-CM

## 2025-06-10 DIAGNOSIS — R41.3 MEMORY DISTURBANCE: ICD-10-CM

## 2025-06-10 DIAGNOSIS — E11.9 TYPE 2 DIABETES, HBA1C GOAL < 7% (H): ICD-10-CM

## 2025-06-10 DIAGNOSIS — F79 INTELLECTUAL DISABILITY: ICD-10-CM

## 2025-06-10 PROCEDURE — 3074F SYST BP LT 130 MM HG: CPT | Performed by: NURSE PRACTITIONER

## 2025-06-10 PROCEDURE — G0463 HOSPITAL OUTPT CLINIC VISIT: HCPCS | Performed by: NURSE PRACTITIONER

## 2025-06-10 PROCEDURE — G2211 COMPLEX E/M VISIT ADD ON: HCPCS | Performed by: NURSE PRACTITIONER

## 2025-06-10 PROCEDURE — 1126F AMNT PAIN NOTED NONE PRSNT: CPT | Performed by: NURSE PRACTITIONER

## 2025-06-10 PROCEDURE — 3078F DIAST BP <80 MM HG: CPT | Performed by: NURSE PRACTITIONER

## 2025-06-10 PROCEDURE — 99213 OFFICE O/P EST LOW 20 MIN: CPT | Performed by: NURSE PRACTITIONER

## 2025-06-10 ASSESSMENT — PAIN SCALES - GENERAL: PAINLEVEL_OUTOF10: NO PAIN (0)

## 2025-06-10 NOTE — PROGRESS NOTES
[facility] GERIATRICS    Chief Complaint   Patient presents with    RECHECK     HPI:  Marly Cannon is a 59 year old  (1965), who is being seen today for an episodic care visit at: [facility]) [[id number]]. Today's concern is: The primary encounter diagnosis was Physical deconditioning. Diagnoses of Generalized muscle weakness, Acute on chronic respiratory failure with hypoxia and hypercapnia (H), History of pneumonia, Chronic obstructive pulmonary disease with acute exacerbation (H), ADEEL (obstructive sleep apnea), Perineal abscess, Condyloma acuminata, Pulmonary emphysema, unspecified emphysema type (H), Acute pulmonary embolism, unspecified pulmonary embolism type, unspecified whether acute cor pulmonale present (H), Type 2 diabetes, HbA1c goal < 7% (H), Diabetic polyneuropathy associated with diabetes mellitus due to underlying condition (H), YADIRA (acute kidney injury), Chronic kidney disease, stage 4 (severe) (H), PAF (paroxysmal atrial fibrillation) (H), Essential hypertension, Hyperlipidemia, unspecified hyperlipidemia type, Intellectual delay, Unable to care for self, Seborrheic dermatitis, Reactive thrombocytosis, Open wound, Type 2 diabetes mellitus with stage 3 chronic kidney disease, with long-term current use of insulin, unspecified whether stage 3a or 3b CKD (H), Other acute pulmonary embolism without acute cor pulmonale (H), and Slow transit constipation were also pertinent to this visit.    Met with patient who was found lying in bed. She denies any chest pain, palpitations, shortness of breath, RUBY, lightheadedness, dizziness, or cough. Denies any abdominal discomfort. Denies N&V. Denies B&B concerns. Denies dysuria or frequency. Denies loose or constipation. Appetite good. Sleeping well. No pain complaints.     BP Readings from Last 3 Encounters:   06/10/25 107/68   06/10/25 112/77   06/09/25 138/75     Wt Readings from Last 5 Encounters:   06/10/25  "83.1 kg (183 lb 3.2 oz)   06/09/25 83.1 kg (183 lb 3.2 oz)   06/04/25 83 kg (183 lb)   06/02/25 83 kg (183 lb)   05/29/25 83 kg (183 lb)     Allergies, and PMH/PSH reviewed in EPIC today.  REVIEW OF SYSTEMS:  Limited secondary to cognitive impairment but today pt reports as noted per HPI    Objective:   /68   Pulse 90   Temp 97.9  F (36.6  C)   Resp 18   Ht 1.651 m (5' 5\")   Wt 83.1 kg (183 lb 3.2 oz)   SpO2 95%   BMI 30.49 kg/m    GENERAL APPEARANCE:  Alert, in no distress, cooperative  ENT:  Mouth and posterior oropharynx normal, moist mucous membranes, [place]  EYES:  EOM, conjunctivae, lids, pupils and irises normal  NECK:  No adenopathy,masses or thyromegaly  RESP:  respiratory effort and palpation of chest normal, lungs clear to auscultation , no respiratory distress  CV:  regular rate and rhythm, no murmur, rub, or gallop, no edema, +2 pedal pulses  ABDOMEN:  normal bowel sounds, soft, nontender, no hepatosplenomegaly or other masses, no guarding or rebound  M/S:   Ambulates with walker. Staff to assist for ADLs, transfers and cares  SKIN:  Inspection of skin and subcutaneous tissue baseline, Palpation of skin and subcutaneous tissue baseline  NEURO:   Cranial nerves 2-12 are normal tested and grossly at patient's baseline, no purposeful movement in upper and lower extremities  PSYCH:  oriented X 3, insight and judgement impaired, memory impaired , affect and mood normal    Most Recent 3 CBC's:  Recent Labs   Lab Test 05/29/25  0840 05/21/25  1152 05/15/25  0850   WBC 9.4 9.3 10.6   HGB 13.2 12.3 12.5   MCV 93 91 94    382 425     Most Recent 3 BMP's:  Recent Labs   Lab Test 05/29/25  0840 05/21/25  1152 05/15/25  0850    134* 137   POTASSIUM 4.6 4.4 4.4   CHLORIDE 101 97* 98   CO2 26 23 25   BUN 34.8* 41.4* 48.6*   CR 1.46* 1.56* 1.25*   ANIONGAP 11 14 14   NORM 10.2 9.7 10.0   * 218* 164*     Most Recent 2 LFT's:  Recent Labs   Lab Test 05/29/25  0840 05/15/25  0850   AST 21 24 "   ALT 15 11   ALKPHOS 88 96   BILITOTAL 0.6 0.6     Most Recent Hemoglobin A1c:  Recent Labs   Lab Test 03/22/25  1731   A1C 12.7*     Most Recent Anemia Panel:  Recent Labs   Lab Test 05/29/25  0840 05/21/25  1152 05/15/25  0850 04/23/25  0623 04/21/25  0814 04/07/25  0542 04/06/25  0431 04/05/25  0451 04/19/23  1510 01/17/23  1505   WBC 9.4   < > 10.6   < > 9.7   < > 7.3 7.7   < > 10.1   HGB 13.2   < > 12.5   < > 11.2*   < > 10.1* 9.9*   < > 17.1*   HCT 41.7   < > 39.2   < > 34.8*   < > 31.2* 32.5*   < > 50.1*   MCV 93   < > 94   < > 96   < > 94 96   < > 88      < > 425   < > 650*   < > 282 292   < > 263   IRON  --   --   --   --   --   --   --  51  --   --    IRONSAT  --   --   --   --   --   --   --  27  --   --    RETICABSCT  --   --   --   --  0.116*  --   --   --   --   --    RETP  --   --   --   --  3.2*  --   --   --   --   --    FEB  --   --   --   --   --   --   --  188*  --   --    OLIVA  --   --   --   --   --   --   --  407*  --   --    B12  --   --  559  --   --   --   --  859   < >  --    FOLIC  --   --   --   --   --   --  4.9  --   --   --    EPOE  --   --   --   --   --   --   --   --   --  7    < > = values in this interval not displayed.     ASSESSMENT/PLAN:  Physical deconditioning  Generalized muscle weakness  Unable to care for self  Intellectual delay  Comment: Chronic. S/T prolonged hospitalization. Per therapy- Patient requires set up for UB needs, Supv for LB needs and toileting. Ambulates up to 200ft 4WW SBA. CPT 4.7/5.6.   Plan:  -Continue Physical therapy and Occupational therapy as directed--projected LCD 6/12 and will transition to LTC on site pending relocation needs  -SW to remain involved for safe discharge planning needs  -Recommend LTC/[facility] placement post TCU in [facility] where she wishes to be near family     Acute on Chronic Hypoxic Respiratory Failure  History of VAP  COPD  ADEEL  PE, Acute, segmental, subsegmental  Pulmonary emphysema  Comment: Acute on chronic. Admitted  to [facility] on 3/22/25: She had a repeat CTA that demonstrated progression of her PE and RLL collapse concerning for mucous plugging and pneumonia. Primary team has been having issues w/ progressively increasing FiO2 needs. She had a sputum culture grow Candida & has been on fluconazole since 3/25. Transferred to [facility] d/t concerns that she might require a tracheostomy secondary to her inability to liberate from the vent. At [facility] patient was admitted to the ICU on a ventilator, successfully extubated 4/1 and downgraded to floor status 4/3. Infectious disease consulted and continued patient on Zosyn course to be completed through 4/17/2025. Patient was able to liberate from ventilator withOUT tracheostomy formation.   Plan:  -Monitor respiratory status  -Oxygen 3L via oxymask overnights.   -Encourage incentive spirometry every 4 hours while awake  -Continue apixaban 5mg BID  -Continue albuterol nebulization every 2 hour PRN  -Continue umeclidinium-vilanterol (anoro ellipta) 62.5-25mcg inhaler daily  -BMP, CBC and hgb A1c due 6/11/25--results pending  -Trend labs periodically while on site     Perineal Abscess  Condyloma Acuminata  Comment: Acute on chronic. Per recent hospitalization-General surgery consulted for right keiry/perineal abscess and performed incision and drainage with Penrose drain placement 3/31 with subsequent repeat I&D 4/2 and 4/8. s/p acyclovir and I&D x3. Noted on 4/28/25-5/4/25: Wound vac remains--penrose drain removed--packing to tract along/under R labia. Landrum catheter successfully removed.   Plan:  -Monitor for worsening s/symptoms of concerns  -Air mattress while on site  -Follow up with wound clinic as directed PRN  -Monitor pain complaints  -Continue Tylenol PRN  -Wound is now healed. All wound cares were discontinued  -BMP, CBC and hgb A1c due 6/11/25--results pending  -Trend labs periodically while on site     Seborrheic dermatitis:   Comment: Acute on  chronic. Slowly improving to face with regimen  Plan:  -Monitor for worsening s/symptoms of concerns  -Dermatology PRN if symptoms worsen  -Continue triamcinolone cream to face BID x 2 weeks  -Continue ketoconazole 2% shampoo- apply to scalp and body during showers     T2DM  Comment: Chronic. Poorly controlled as outpatient with A1c 12.7 in march 2025. Used to be on insulin pump. No longer on it due to intellectual delay. Not able to complete own Blood Glucose monitoring along with insulin administration needs despite education  Plan:  -Continue Blood Glucose BID using Freestyle Bud 2 device. Change device every 14 days and PRN. Update provider if Blood Glucose<70 and.or >450  -Continue jardiance 25mg daily  -BMP, CBC and hgb A1c due 6/11/25--results pending  -Trend labs periodically while on site  -Continue insulin glargine 38 units at HS. HOLD if Blood Glucose<120     CKD stage 4  YADIRA  Comment: Chronic. Baseline creatinine~1.1-1.3  Plan:  -Monitor urinary status  -Monitor for worsening s/symptoms of concerns  -BMP, CBC and hgb A1c due 6/11/25--results pending  -Trend labs periodically while on site     Essential hypertension   Hyperlipidemia, unspecified hyperlipidemia type   Comment: Chronic. Based on JNC-8 goals,  patients age of 59 year old, presence of diabetes or CKD, and goals of care goal BP is  <140/90 mm Hg. Patient is stable with current plan of care and routine assessment..  Plan:  -Monitor BP and HR as directed  -Continue amlodipine 5mg daily. HOLD if SBP<100  -Continue atorvastatin 40mg daily  -Continue apixaban 5mg BID  -BMP, CBC and hgb A1c due 6/11/25--results pending  -Trend labs periodically while on site     Reactive thrombocytosis  Comment: Chronic. Platelets historically in normal range (reviewed last 3 years). Peaked at 700,000, now resolved--Likely  reactive - reviewed flow chart for acute thrombocytosis, posted in note. Normal peripheral smear, liver enzymes  Plan;  -Monitor bleeding  risks  -Monitor for worsening s/symptoms of concerns  -BMP, CBC and hgb A1c due 6/11/25--results pending  -Trend labs periodically while on site     Electronically signed by:  Dr. Jayshree Montenegro DNP, APRN, FNP-C, WCS-C, EDS-C     Provider reviewed records from facility, and interpreted most recent imaging/lab work, and vital signs.   Acute and chronic conditions managed by writer. Have been reviewed during today's exam

## 2025-06-10 NOTE — PROGRESS NOTES
Follow up Vascular Visit       Date of Service:06/10/25      Chief Complaint: right groin wound      Pt returns to [facility] with regards to their right groin wound.  They arrive today alone; brought by medical transport. They are currently using silvercel and bordered foam to the wounds. This is being done by staff at her facility every 1-2 days. They are using nothing for compression. They are feeling well today. Denies fevers, chills. No shortness of breath.     Allergies: No Known Allergies    Medications:   Current Outpatient Medications:     acetaminophen (TYLENOL) 325 MG tablet, Take 2 tablets (650 mg) by mouth every 6 hours as needed for mild pain or fever., Disp: , Rfl:     albuterol (PROVENTIL) (2.5 MG/3ML) 0.083% neb solution, Take 1 vial (2.5 mg) by nebulization every 2 hours as needed for shortness of breath., Disp: , Rfl:     amLODIPine (NORVASC) 5 MG tablet, Take 1 tablet (5 mg) by mouth daily. HOLD if SBP<100, Disp: , Rfl:     apixaban ANTICOAGULANT (ELIQUIS) 5 MG tablet, Take 1 tablet (5 mg) by mouth 2 times daily., Disp: , Rfl:     atorvastatin (LIPITOR) 40 MG tablet, TAKE 1 TABLET BY MOUTH AT BEDTIME, Disp: 90 tablet, Rfl: 2    Cholecalciferol (VITAMIN D3) 50 MCG (2000 UT) CAPS, TAKE 1 CAPSULE BY MOUTH DAILY, Disp: 90 capsule, Rfl: 0    clotrimazole (LOTRIMIN) 1 % external cream, Apply topically daily., Disp: 30 g, Rfl: 2    Continuous Glucose Sensor (FREESTYLE HANK 2 SENSOR) MISC, 1 each every 14 days. Use 1 sensor every 14 days. Use to read blood sugars per 's instructions., Disp: 6 each, Rfl: 5    empagliflozin (JARDIANCE) 25 MG TABS tablet, Take 1 tablet (25 mg) by mouth daily, Disp: 90 tablet, Rfl: 3    insulin glargine (LANTUS PEN) 100 UNIT/ML pen, Inject 38 Units subcutaneously at bedtime. HOLD if Blood Glucose<120, Disp: , Rfl:     insulin pen needle (32G X 4 MM) 32G X 4 MM miscellaneous, Use 1 pen needles daily, Disp: 100 each, Rfl: 3     ketoconazole (NIZORAL) 2 % external shampoo, Apply to scalp and body on shower days, Disp: 120 mL, Rfl: 11    multivitamin w/minerals (THERA-VIT-M) tablet, Take 1 tablet by mouth daily., Disp: , Rfl:     ondansetron (ZOFRAN ODT) 4 MG ODT tab, Take 1 tablet (4 mg) by mouth every 6 hours as needed., Disp: , Rfl:     OneTouch Delica Lancets 33G MISC, Inject 1 each Subcutaneous 3 times daily (before meals), Disp: 100 each, Rfl: 11    order for DME, Women briefs, Disp: 50 each, Rfl: 1    polyethylene glycol (MIRALAX) 17 g packet, Take 17 g by mouth daily as needed for constipation., Disp: , Rfl:     sennosides (SENOKOT) 8.6 MG tablet, Take 1 tablet by mouth 2 times daily as needed for constipation., Disp: , Rfl:     triamcinolone (KENALOG) 0.1 % external cream, Apply topically 2 times daily for 14 days. Apply to face, Disp: 80 g, Rfl: 0    umeclidinium-vilanterol (ANORO ELLIPTA) 62.5-25 MCG/ACT oral inhaler, Inhale 1 puff into the lungs daily., Disp: , Rfl:     History:   Past Medical History:   Diagnosis Date    ACP (advance care planning) 09/09/2016    Advance Care Planning 9/9/2016: ACP Review of Chart / Resources Provided:  Reviewed chart for advance care plan.  Marly Cannon has been provided information and resources to begin or update their advance care plan.  Added by Naty Allen               Acute on chronic respiratory failure with hypoxia and hypercapnia (H) 03/22/2025    Acute respiratory failure with hypoxia (H) 03/31/2025    Breast fibrocystic disorder 02/22/2008    Overview:   IMO Update 10/11      Callus of foot 05/29/2019    COPD (chronic obstructive pulmonary disease) (H)     Diabetes (H)     Diabetic ketosis (H) 03/18/2025    Diabetic polyneuropathy associated with diabetes mellitus due to underlying condition (H) 01/17/2023    Essential hypertension 05/28/2019    [place] cardiac risk <10% in next 10 years 02/22/2019    Overview:   Started high intensity statin.       H/O colonoscopy  04/24/2019    Had in 10/2018, due for repeat 5 years      Hyperglycemia 03/22/2025    Hyperlipidemia, unspecified hyperlipidemia type 05/28/2019    Hyperparathyroidism due to renal insufficiency 06/14/2016    Hypertension     Intellectual disability 08/01/2017    Memory disturbance 02/13/2020    Microalbuminuria due to type 2 diabetes mellitus (H) 06/14/2016    ADEEL (obstructive sleep apnea) 02/16/2021    Interp for PSG dated 12/16/2020.     Weight 202 lbs.  Total sleep time 417.5 minutes, sleep efficiency 83%, sleep latency 15 minutes, REM latency - minutes.  Arousal index 45.6.  Sleep architecture was incredibly fragmented with no clear REM observed.     AHI 43.1, events appearing primarily obstructive in nature.  Mean Spo2 86%, isabel SpO2 78%, 96.2% of recording was <= 89%.       EKG appeared NS    PAF (paroxysmal atrial fibrillation) (H) 03/22/2025    Pneumonia 03/22/2025    Pulmonary emphysema (H) 08/21/2015    Confirmed via PFTs in 8/2015      Stage 3 chronic kidney disease (H) 02/12/2016    Overview:   Updated per 10/1/17 IMO import      Tobacco abuse 07/09/2015    Tremor 11/09/2009    Formatting of this note might be different from the original.  Shakes head      Tubular adenoma of colon 09/28/2018    Type 2 diabetes, HbA1c goal < 7% (H) 07/24/2015    Unable to care for self 03/18/2025    Urinary incontinence, unspecified type 01/17/2023    Vitamin D deficiency 06/14/2016       Physical Exam:    /77 (BP Location: Right arm)   Pulse 98   Temp 98.1  F (36.7  C) (Oral)   SpO2 95%     General:  Patient presents to clinic in no apparent distress.  Head: normocephalic atraumatic  Psychiatric:  Alert and oriented x3.   Respiratory: unlabored breathing; no cough  Integumentary:  Skin is uniformly warm, dry and pink.    Ulcer #1 Location: right groin  Size: 0L x 0W x 0depth.  No sinus tract present, Wound base: scar tissue  no undermining present. Ulcer is healed thickness. There is no drainage. Periwound: no  "denudement, erythema, induration, maceration or warmth.      Wound Sacrum (Active)   Number of days: 70       Wound Perineum Other (comment) (Active)   Number of days: 70       Wound Anterior Nose Other (comment) (Active)   Number of days: 46       Wound Anterior;Right Pelvis (Active)   Number of days: 42       VASC Wound Rt groin (Active)   Pre Size Length 5.5 05/20/25 1400   Pre Size Width 13.5 05/20/25 1400   Pre Size Depth 0.1 05/20/25 1400   Pre Total Sq cm 74.25 05/20/25 1400   Description scab 06/10/25 1411   Number of days: 21            Circumferential volume measures:           No data to display                Labs:    I personally reviewed the following lab results today and those on care everywhere    CRP Inflammation   Date Value Ref Range Status   07/01/2019 <2.9 0.0 - 8.0 mg/L Final      No results found for: \"SED\"   Last Renal Panel:  Sodium   Date Value Ref Range Status   05/29/2025 138 135 - 145 mmol/L Final   06/23/2021 138 133 - 144 mmol/L Final     Potassium   Date Value Ref Range Status   05/29/2025 4.6 3.4 - 5.3 mmol/L Final   07/15/2022 4.1 3.4 - 5.3 mmol/L Final   06/23/2021 4.7 3.4 - 5.3 mmol/L Final     Comment:     Specimen slightly hemolyzed, potassium may be falsely elevated     Potassium POCT   Date Value Ref Range Status   05/05/2025 4.0 3.4 - 5.3 mmol/L Final     Chloride   Date Value Ref Range Status   05/29/2025 101 98 - 107 mmol/L Final   07/15/2022 103 94 - 109 mmol/L Final   06/23/2021 106 94 - 109 mmol/L Final     Carbon Dioxide   Date Value Ref Range Status   06/23/2021 18 (L) 20 - 32 mmol/L Final     Carbon Dioxide (CO2)   Date Value Ref Range Status   05/29/2025 26 22 - 29 mmol/L Final   07/15/2022 31 20 - 32 mmol/L Final     Anion Gap   Date Value Ref Range Status   05/29/2025 11 7 - 15 mmol/L Final   07/15/2022 3 3 - 14 mmol/L Final   06/23/2021 14 3 - 14 mmol/L Final     Glucose   Date Value Ref Range Status   05/29/2025 182 (H) 70 - 99 mg/dL Final   07/15/2022 249 (H) " "70 - 99 mg/dL Final   06/23/2021 156 (H) 70 - 99 mg/dL Final     GLUCOSE BY METER POCT   Date Value Ref Range Status   05/09/2025 208 (H) 70 - 99 mg/dL Final     Urea Nitrogen   Date Value Ref Range Status   05/29/2025 34.8 (H) 8.0 - 23.0 mg/dL Final   07/15/2022 17 7 - 30 mg/dL Final   06/23/2021 27 7 - 30 mg/dL Final     Creatinine   Date Value Ref Range Status   05/29/2025 1.46 (H) 0.51 - 0.95 mg/dL Final   06/23/2021 1.07 (H) 0.52 - 1.04 mg/dL Final     GFR Estimate   Date Value Ref Range Status   05/29/2025 41 (L) >60 mL/min/1.73m2 Final     Comment:     eGFR calculated using 2021 CKD-EPI equation.   06/23/2021 57 (L) >60 mL/min/[1.73_m2] Final     Comment:     Non  GFR Calc  Starting 12/18/2018, serum creatinine based estimated GFR (eGFR) will be   calculated using the Chronic Kidney Disease Epidemiology Collaboration   (CKD-EPI) equation.       GFR, ESTIMATED POCT   Date Value Ref Range Status   04/10/2024 47 (L) >60 mL/min/1.73m2 Final     Calcium   Date Value Ref Range Status   05/29/2025 10.2 8.8 - 10.4 mg/dL Final   06/23/2021 9.9 8.5 - 10.1 mg/dL Final     Phosphorus   Date Value Ref Range Status   05/09/2025 4.1 2.5 - 4.5 mg/dL Final     Albumin   Date Value Ref Range Status   05/29/2025 3.9 3.5 - 5.2 g/dL Final   07/15/2022 3.5 3.4 - 5.0 g/dL Final   05/10/2021 3.6 3.4 - 5.0 g/dL Final      Lab Results   Component Value Date    WBC 9.4 05/29/2025    WBC 7.5 11/09/2020     Lab Results   Component Value Date    RBC 4.47 05/29/2025    RBC 5.60 11/09/2020     Lab Results   Component Value Date    HGB 13.2 05/29/2025    HGB 16.3 03/11/2021     Lab Results   Component Value Date    HCT 41.7 05/29/2025    HCT 50.1 11/09/2020     No components found for: \"MCT\"  Lab Results   Component Value Date    MCV 93 05/29/2025    MCV 90 11/09/2020     Lab Results   Component Value Date    MCH 29.5 05/29/2025    MCH 29.8 11/09/2020     Lab Results   Component Value Date    MCHC 31.7 05/29/2025    MCHC 33.3 " 11/09/2020     Lab Results   Component Value Date    RDW 13.1 05/29/2025    RDW 12.9 11/09/2020     Lab Results   Component Value Date     05/29/2025     11/09/2020      Lab Results   Component Value Date    A1C 12.7 03/22/2025    A1C 9.6 12/05/2024    A1C 9.7 05/16/2024    A1C 10.2 02/16/2024    A1C 11.0 11/15/2023    A1C >14.0 05/10/2021    A1C 10.7 02/10/2021    A1C 7.6 11/09/2020    A1C 7.9 08/07/2020    A1C 8.1 05/04/2020      TSH   Date Value Ref Range Status   03/18/2025 1.18 0.30 - 4.20 uIU/mL Final   12/04/2019 0.97 0.40 - 4.00 mU/L Final      Lab Results   Component Value Date    VITDT 46 05/15/2025    VITDT 49 07/19/2023    VITDT 46 01/17/2023    VITDT 45 07/01/2019                   Impression:  Encounter Diagnoses   Name Primary?    Groin abscess Yes    Diabetic polyneuropathy associated with diabetes mellitus due to underlying condition (H)     Memory disturbance     Type 2 diabetes, HbA1c goal < 7% (H)     Intellectual disability                    Are any of these ulcers new today: No; Location: na    Assessment/Plan:          1. Debridement: na     2.  Ulcer treatment: ulcer treatment will include irrigation and dressings to promote autolytic debridement which will include:healed; no dressings needed. If for some reason the patient is not able to get their dressing(s) changed as outlined above (due to illness, lack of supplies, lack of help) please do the following: remove old, soiled dressings; wash the ulcers with saline; pat dry; apply ABD pad or other absorbant pad and secure with rolled gauze; avoid tape directly on your skin; patient instructed to call the clinic as soon as possible to let us know what the current issues are in receiving ulcer care. Stable            3. Edema: na.            4. Nutrition: having better glucose control           5. Offloading: na          6. Wound Etiology: infection; abscess    The longitudinal plan of care for the diagnosis(es)/condition(s) as  documented were addressed during this visit. Due to the added complexity in care, I will continue to support Marly in the subsequent management and with ongoing continuity of care.     Patient will follow up with me in prn weeks for reevaluation. They were instructed to call the clinic sooner with any signs or symptoms of infection or any further questions/concerns. Answered all questions.          Jessica Tapia DNP, RN, CNP, CWOCN, CFCN, CLT  [facility]   [phone]        This note was electronically signed by Jessica Tapia, GRISEL

## 2025-06-11 ENCOUNTER — TRANSITIONAL CARE UNIT VISIT (OUTPATIENT)
Dept: GERIATRICS | Facility: CLINIC | Age: 60
End: 2025-06-11
Payer: COMMERCIAL

## 2025-06-11 ENCOUNTER — LAB REQUISITION (OUTPATIENT)
Dept: LAB | Facility: CLINIC | Age: 60
End: 2025-06-11
Payer: COMMERCIAL

## 2025-06-11 DIAGNOSIS — E78.5 HYPERLIPIDEMIA, UNSPECIFIED HYPERLIPIDEMIA TYPE: ICD-10-CM

## 2025-06-11 DIAGNOSIS — F81.9 INTELLECTUAL DELAY: ICD-10-CM

## 2025-06-11 DIAGNOSIS — Z87.01 HISTORY OF PNEUMONIA: ICD-10-CM

## 2025-06-11 DIAGNOSIS — A63.0 CONDYLOMA ACUMINATA: ICD-10-CM

## 2025-06-11 DIAGNOSIS — T14.8XXA OPEN WOUND: ICD-10-CM

## 2025-06-11 DIAGNOSIS — N18.30 CHRONIC KIDNEY DISEASE, STAGE 3 UNSPECIFIED (H): ICD-10-CM

## 2025-06-11 DIAGNOSIS — D64.9 ANEMIA, UNSPECIFIED: ICD-10-CM

## 2025-06-11 DIAGNOSIS — J96.21 ACUTE ON CHRONIC RESPIRATORY FAILURE WITH HYPOXIA AND HYPERCAPNIA (H): ICD-10-CM

## 2025-06-11 DIAGNOSIS — I10 ESSENTIAL (PRIMARY) HYPERTENSION: ICD-10-CM

## 2025-06-11 DIAGNOSIS — G47.33 OSA (OBSTRUCTIVE SLEEP APNEA): ICD-10-CM

## 2025-06-11 DIAGNOSIS — L21.9 SEBORRHEIC DERMATITIS: ICD-10-CM

## 2025-06-11 DIAGNOSIS — Z79.4 TYPE 2 DIABETES MELLITUS WITH STAGE 3 CHRONIC KIDNEY DISEASE, WITH LONG-TERM CURRENT USE OF INSULIN, UNSPECIFIED WHETHER STAGE 3A OR 3B CKD (H): ICD-10-CM

## 2025-06-11 DIAGNOSIS — N18.4 CHRONIC KIDNEY DISEASE, STAGE 4 (SEVERE) (H): ICD-10-CM

## 2025-06-11 DIAGNOSIS — E11.22 TYPE 2 DIABETES MELLITUS WITH STAGE 3 CHRONIC KIDNEY DISEASE, WITH LONG-TERM CURRENT USE OF INSULIN, UNSPECIFIED WHETHER STAGE 3A OR 3B CKD (H): ICD-10-CM

## 2025-06-11 DIAGNOSIS — E11.9 TYPE 2 DIABETES MELLITUS WITHOUT COMPLICATIONS (H): ICD-10-CM

## 2025-06-11 DIAGNOSIS — Z78.9 UNABLE TO CARE FOR SELF: ICD-10-CM

## 2025-06-11 DIAGNOSIS — E11.9 TYPE 2 DIABETES, HBA1C GOAL < 7% (H): ICD-10-CM

## 2025-06-11 DIAGNOSIS — R53.81 PHYSICAL DECONDITIONING: Primary | ICD-10-CM

## 2025-06-11 DIAGNOSIS — J96.22 ACUTE ON CHRONIC RESPIRATORY FAILURE WITH HYPOXIA AND HYPERCAPNIA (H): ICD-10-CM

## 2025-06-11 DIAGNOSIS — J44.1 CHRONIC OBSTRUCTIVE PULMONARY DISEASE WITH ACUTE EXACERBATION (H): ICD-10-CM

## 2025-06-11 DIAGNOSIS — K59.01 SLOW TRANSIT CONSTIPATION: ICD-10-CM

## 2025-06-11 DIAGNOSIS — I26.99 ACUTE PULMONARY EMBOLISM, UNSPECIFIED PULMONARY EMBOLISM TYPE, UNSPECIFIED WHETHER ACUTE COR PULMONALE PRESENT (H): ICD-10-CM

## 2025-06-11 DIAGNOSIS — I26.99 OTHER ACUTE PULMONARY EMBOLISM WITHOUT ACUTE COR PULMONALE (H): ICD-10-CM

## 2025-06-11 DIAGNOSIS — M62.81 GENERALIZED MUSCLE WEAKNESS: ICD-10-CM

## 2025-06-11 DIAGNOSIS — I48.0 PAF (PAROXYSMAL ATRIAL FIBRILLATION) (H): ICD-10-CM

## 2025-06-11 DIAGNOSIS — I10 ESSENTIAL HYPERTENSION: ICD-10-CM

## 2025-06-11 DIAGNOSIS — L02.215 PERINEAL ABSCESS: ICD-10-CM

## 2025-06-11 DIAGNOSIS — N18.30 TYPE 2 DIABETES MELLITUS WITH STAGE 3 CHRONIC KIDNEY DISEASE, WITH LONG-TERM CURRENT USE OF INSULIN, UNSPECIFIED WHETHER STAGE 3A OR 3B CKD (H): ICD-10-CM

## 2025-06-11 DIAGNOSIS — N17.9 AKI (ACUTE KIDNEY INJURY): ICD-10-CM

## 2025-06-11 DIAGNOSIS — J43.9 PULMONARY EMPHYSEMA, UNSPECIFIED EMPHYSEMA TYPE (H): ICD-10-CM

## 2025-06-11 DIAGNOSIS — D75.838 REACTIVE THROMBOCYTOSIS: ICD-10-CM

## 2025-06-11 DIAGNOSIS — E08.42 DIABETIC POLYNEUROPATHY ASSOCIATED WITH DIABETES MELLITUS DUE TO UNDERLYING CONDITION (H): ICD-10-CM

## 2025-06-11 PROCEDURE — 99309 SBSQ NF CARE MODERATE MDM 30: CPT | Performed by: NURSE PRACTITIONER

## 2025-06-11 NOTE — LETTER
6/11/2025      Marly Cannon  [address]  [place] [address]        [facility] GERIATRICS    Chief Complaint   Patient presents with     RECHECK     HPI:  Marly Cannon is a 59 year old  (1965), who is being seen today for an episodic care visit at: [facility] ([facility]) [[id number]]. Today's concern is: The primary encounter diagnosis was Physical deconditioning. Diagnoses of Generalized muscle weakness, Acute on chronic respiratory failure with hypoxia and hypercapnia (H), History of pneumonia, Chronic obstructive pulmonary disease with acute exacerbation (H), ADEEL (obstructive sleep apnea), Perineal abscess, Condyloma acuminata, Pulmonary emphysema, unspecified emphysema type (H), Acute pulmonary embolism, unspecified pulmonary embolism type, unspecified whether acute cor pulmonale present (H), Type 2 diabetes, HbA1c goal < 7% (H), Diabetic polyneuropathy associated with diabetes mellitus due to underlying condition (H), YADIRA (acute kidney injury), Chronic kidney disease, stage 4 (severe) (H), PAF (paroxysmal atrial fibrillation) (H), Essential hypertension, Hyperlipidemia, unspecified hyperlipidemia type, Intellectual delay, Unable to care for self, Seborrheic dermatitis, Reactive thrombocytosis, Open wound, Type 2 diabetes mellitus with stage 3 chronic kidney disease, with long-term current use of insulin, unspecified whether stage 3a or 3b CKD (H), Other acute pulmonary embolism without acute cor pulmonale (H), and Slow transit constipation were also pertinent to this visit.    Met with patient who was found lying in bed. She denies any chest pain, palpitations, shortness of breath, RUBY, lightheadedness, dizziness, or cough. Denies any abdominal discomfort. Denies N&V. Denies B&B concerns. Denies dysuria or frequency. Denies loose or constipation. Appetite good. Sleeping well. No pain complaints.     BP Readings from Last 3 Encounters:   06/10/25 107/68  "  06/10/25 112/77   06/09/25 138/75     Wt Readings from Last 5 Encounters:   06/10/25 83.1 kg (183 lb 3.2 oz)   06/09/25 83.1 kg (183 lb 3.2 oz)   06/04/25 83 kg (183 lb)   06/02/25 83 kg (183 lb)   05/29/25 83 kg (183 lb)     Allergies, and PMH/PSH reviewed in EPIC today.  REVIEW OF SYSTEMS:  Limited secondary to cognitive impairment but today pt reports as noted per HPI    Objective:   /68   Pulse 90   Temp 97.9  F (36.6  C)   Resp 18   Ht 1.651 m (5' 5\")   Wt 83.1 kg (183 lb 3.2 oz)   SpO2 95%   BMI 30.49 kg/m    GENERAL APPEARANCE:  Alert, in no distress, cooperative  ENT:  Mouth and posterior oropharynx normal, moist mucous membranes, [place]  EYES:  EOM, conjunctivae, lids, pupils and irises normal  NECK:  No adenopathy,masses or thyromegaly  RESP:  respiratory effort and palpation of chest normal, lungs clear to auscultation , no respiratory distress  CV:  regular rate and rhythm, no murmur, rub, or gallop, no edema, +2 pedal pulses  ABDOMEN:  normal bowel sounds, soft, nontender, no hepatosplenomegaly or other masses, no guarding or rebound  M/S:   Ambulates with walker. Staff to assist for ADLs, transfers and cares  SKIN:  Inspection of skin and subcutaneous tissue baseline, Palpation of skin and subcutaneous tissue baseline  NEURO:   Cranial nerves 2-12 are normal tested and grossly at patient's baseline, no purposeful movement in upper and lower extremities  PSYCH:  oriented X 3, insight and judgement impaired, memory impaired , affect and mood normal    Most Recent 3 CBC's:  Recent Labs   Lab Test 05/29/25  0840 05/21/25  1152 05/15/25  0850   WBC 9.4 9.3 10.6   HGB 13.2 12.3 12.5   MCV 93 91 94    382 425     Most Recent 3 BMP's:  Recent Labs   Lab Test 05/29/25  0840 05/21/25  1152 05/15/25  0850    134* 137   POTASSIUM 4.6 4.4 4.4   CHLORIDE 101 97* 98   CO2 26 23 25   BUN 34.8* 41.4* 48.6*   CR 1.46* 1.56* 1.25*   ANIONGAP 11 14 14   NORM 10.2 9.7 10.0   * 218* 164* "     Most Recent 2 LFT's:  Recent Labs   Lab Test 05/29/25  0840 05/15/25  0850   AST 21 24   ALT 15 11   ALKPHOS 88 96   BILITOTAL 0.6 0.6     Most Recent Hemoglobin A1c:  Recent Labs   Lab Test 03/22/25  1731   A1C 12.7*     Most Recent Anemia Panel:  Recent Labs   Lab Test 05/29/25  0840 05/21/25  1152 05/15/25  0850 04/23/25  0623 04/21/25  0814 04/07/25  0542 04/06/25  0431 04/05/25  0451 04/19/23  1510 01/17/23  1505   WBC 9.4   < > 10.6   < > 9.7   < > 7.3 7.7   < > 10.1   HGB 13.2   < > 12.5   < > 11.2*   < > 10.1* 9.9*   < > 17.1*   HCT 41.7   < > 39.2   < > 34.8*   < > 31.2* 32.5*   < > 50.1*   MCV 93   < > 94   < > 96   < > 94 96   < > 88      < > 425   < > 650*   < > 282 292   < > 263   IRON  --   --   --   --   --   --   --  51  --   --    IRONSAT  --   --   --   --   --   --   --  27  --   --    RETICABSCT  --   --   --   --  0.116*  --   --   --   --   --    RETP  --   --   --   --  3.2*  --   --   --   --   --    FEB  --   --   --   --   --   --   --  188*  --   --    OLIVA  --   --   --   --   --   --   --  407*  --   --    B12  --   --  559  --   --   --   --  859   < >  --    FOLIC  --   --   --   --   --   --  4.9  --   --   --    EPOE  --   --   --   --   --   --   --   --   --  7    < > = values in this interval not displayed.     ASSESSMENT/PLAN:  Physical deconditioning  Generalized muscle weakness  Unable to care for self  Intellectual delay  Comment: Chronic. S/T prolonged hospitalization. Per therapy- Patient requires set up for UB needs, Supv for LB needs and toileting. Ambulates up to 200ft 4WW SBA. CPT 4.7/5.6.   Plan:  -Continue Physical therapy and Occupational therapy as directed--projected LCD 6/12 and will transition to LTC on site pending relocation needs  -SW to remain involved for safe discharge planning needs  -Recommend LTC/AVERY placement post TCU in [facility] where she wishes to be near family     Acute on Chronic Hypoxic Respiratory Failure  History of  VAP  COPD  ADEEL  PE, Acute, segmental, subsegmental  Pulmonary emphysema  Comment: Acute on chronic. Admitted to [facility] on 3/22/25: She had a repeat CTA that demonstrated progression of her PE and RLL collapse concerning for mucous plugging and pneumonia. Primary team has been having issues w/ progressively increasing FiO2 needs. She had a sputum culture grow Candida & has been on fluconazole since 3/25. Transferred to [facility] d/t concerns that she might require a tracheostomy secondary to her inability to liberate from the vent. At [facility] patient was admitted to the ICU on a ventilator, successfully extubated 4/1 and downgraded to floor status 4/3. Infectious disease consulted and continued patient on Zosyn course to be completed through 4/17/2025. Patient was able to liberate from ventilator withOUT tracheostomy formation.   Plan:  -Monitor respiratory status  -Oxygen 3L via oxymask overnights.   -Encourage incentive spirometry every 4 hours while awake  -Continue apixaban 5mg BID  -Continue albuterol nebulization every 2 hour PRN  -Continue umeclidinium-vilanterol (anoro ellipta) 62.5-25mcg inhaler daily  -BMP, CBC and hgb A1c due 6/11/25--results pending  -Trend labs periodically while on site     Perineal Abscess  Condyloma Acuminata  Comment: Acute on chronic. Per recent hospitalization-General surgery consulted for right keiry/perineal abscess and performed incision and drainage with Penrose drain placement 3/31 with subsequent repeat I&D 4/2 and 4/8. s/p acyclovir and I&D x3. Noted on 4/28/25-5/4/25: Wound vac remains--penrose drain removed--packing to tract along/under R labia. Landrum catheter successfully removed.   Plan:  -Monitor for worsening s/symptoms of concerns  -Air mattress while on site  -Follow up with wound clinic as directed PRN  -Monitor pain complaints  -Continue Tylenol PRN  -Wound is now healed. All wound cares were discontinued  -BMP, CBC and hgb A1c due  6/11/25--results pending  -Trend labs periodically while on site     Seborrheic dermatitis:   Comment: Acute on chronic. Slowly improving to face with regimen  Plan:  -Monitor for worsening s/symptoms of concerns  -Dermatology PRN if symptoms worsen  -Continue triamcinolone cream to face BID x 2 weeks  -Continue ketoconazole 2% shampoo- apply to scalp and body during showers     T2DM  Comment: Chronic. Poorly controlled as outpatient with A1c 12.7 in march 2025. Used to be on insulin pump. No longer on it due to intellectual delay. Not able to complete own Blood Glucose monitoring along with insulin administration needs despite education  Plan:  -Continue Blood Glucose BID using Freestyle Bud 2 device. Change device every 14 days and PRN. Update provider if Blood Glucose<70 and.or >450  -Continue jardiance 25mg daily  -BMP, CBC and hgb A1c due 6/11/25--results pending  -Trend labs periodically while on site  -Continue insulin glargine 38 units at HS. HOLD if Blood Glucose<120     CKD stage 4  YADIRA  Comment: Chronic. Baseline creatinine~1.1-1.3  Plan:  -Monitor urinary status  -Monitor for worsening s/symptoms of concerns  -BMP, CBC and hgb A1c due 6/11/25--results pending  -Trend labs periodically while on site     Essential hypertension   Hyperlipidemia, unspecified hyperlipidemia type   Comment: Chronic. Based on JNC-8 goals,  patients age of 59 year old, presence of diabetes or CKD, and goals of care goal BP is  <140/90 mm Hg. Patient is stable with current plan of care and routine assessment..  Plan:  -Monitor BP and HR as directed  -Continue amlodipine 5mg daily. HOLD if SBP<100  -Continue atorvastatin 40mg daily  -Continue apixaban 5mg BID  -BMP, CBC and hgb A1c due 6/11/25--results pending  -Trend labs periodically while on site     Reactive thrombocytosis  Comment: Chronic. Platelets historically in normal range (reviewed last 3 years). Peaked at 700,000, now resolved--Likely  reactive - reviewed flow chart  for acute thrombocytosis, posted in note. Normal peripheral smear, liver enzymes  Plan;  -Monitor bleeding risks  -Monitor for worsening s/symptoms of concerns  -BMP, CBC and hgb A1c due 6/11/25--results pending  -Trend labs periodically while on site     Electronically signed by:  Dr. Jayshree Montenegro DNP, APRN, FNP-C, WCS-C, EDS-C     Provider reviewed records from facility, and interpreted most recent imaging/lab work, and vital signs.   Acute and chronic conditions managed by writer. Have been reviewed during today's exam         Sincerely,        Jayshree Montenegro, BLAIR CNP    Electronically signed

## 2025-06-12 LAB
ANION GAP SERPL CALCULATED.3IONS-SCNC: 15 MMOL/L (ref 7–15)
BASOPHILS # BLD AUTO: 0.1 10E3/UL (ref 0–0.2)
BASOPHILS NFR BLD AUTO: 1 %
BUN SERPL-MCNC: 34.4 MG/DL (ref 8–23)
CALCIUM SERPL-MCNC: 10 MG/DL (ref 8.8–10.4)
CHLORIDE SERPL-SCNC: 100 MMOL/L (ref 98–107)
CREAT SERPL-MCNC: 1.43 MG/DL (ref 0.51–0.95)
EGFRCR SERPLBLD CKD-EPI 2021: 42 ML/MIN/1.73M2
EOSINOPHIL # BLD AUTO: 0.3 10E3/UL (ref 0–0.7)
EOSINOPHIL NFR BLD AUTO: 3 %
ERYTHROCYTE [DISTWIDTH] IN BLOOD BY AUTOMATED COUNT: 13 % (ref 10–15)
EST. AVERAGE GLUCOSE BLD GHB EST-MCNC: 183 MG/DL
GLUCOSE SERPL-MCNC: 263 MG/DL (ref 70–99)
HBA1C MFR BLD: 8 %
HCO3 SERPL-SCNC: 23 MMOL/L (ref 22–29)
HCT VFR BLD AUTO: 41.4 % (ref 35–47)
HGB BLD-MCNC: 13.1 G/DL (ref 11.7–15.7)
IMM GRANULOCYTES # BLD: 0 10E3/UL
IMM GRANULOCYTES NFR BLD: 0 %
LYMPHOCYTES # BLD AUTO: 2.9 10E3/UL (ref 0.8–5.3)
LYMPHOCYTES NFR BLD AUTO: 29 %
MCH RBC QN AUTO: 28.9 PG (ref 26.5–33)
MCHC RBC AUTO-ENTMCNC: 31.6 G/DL (ref 31.5–36.5)
MCV RBC AUTO: 91 FL (ref 78–100)
MONOCYTES # BLD AUTO: 0.8 10E3/UL (ref 0–1.3)
MONOCYTES NFR BLD AUTO: 8 %
NEUTROPHILS # BLD AUTO: 6 10E3/UL (ref 1.6–8.3)
NEUTROPHILS NFR BLD AUTO: 60 %
NRBC # BLD AUTO: 0 10E3/UL
NRBC BLD AUTO-RTO: 0 /100
PLATELET # BLD AUTO: 350 10E3/UL (ref 150–450)
POTASSIUM SERPL-SCNC: 4.3 MMOL/L (ref 3.4–5.3)
RBC # BLD AUTO: 4.54 10E6/UL (ref 3.8–5.2)
SODIUM SERPL-SCNC: 138 MMOL/L (ref 135–145)
WBC # BLD AUTO: 10 10E3/UL (ref 4–11)

## 2025-06-12 PROCEDURE — 83036 HEMOGLOBIN GLYCOSYLATED A1C: CPT | Mod: ORL | Performed by: NURSE PRACTITIONER

## 2025-06-12 PROCEDURE — 85041 AUTOMATED RBC COUNT: CPT | Performed by: NURSE PRACTITIONER

## 2025-06-12 PROCEDURE — 36415 COLL VENOUS BLD VENIPUNCTURE: CPT | Mod: ORL | Performed by: NURSE PRACTITIONER

## 2025-06-12 PROCEDURE — 82435 ASSAY OF BLOOD CHLORIDE: CPT | Performed by: NURSE PRACTITIONER

## 2025-06-13 NOTE — PROGRESS NOTES
[facility] GERIATRICS    Chief Complaint   Patient presents with    RECHECK     HPI:  Marly Cannon is a 59 year old  (1965), who is being seen today for an episodic care visit at: [facility] ([facility] ) ([place]) [[id number]]. Today's concern is: The primary encounter diagnosis was Physical deconditioning. Diagnoses of Generalized muscle weakness, Acute on chronic respiratory failure with hypoxia and hypercapnia (H), History of pneumonia, Chronic obstructive pulmonary disease with acute exacerbation (H), ADEEL (obstructive sleep apnea), Perineal abscess, Condyloma acuminata, Pulmonary emphysema, unspecified emphysema type (H), Acute pulmonary embolism, unspecified pulmonary embolism type, unspecified whether acute cor pulmonale present (H), Type 2 diabetes, HbA1c goal < 7% (H), Diabetic polyneuropathy associated with diabetes mellitus due to underlying condition (H), YADIRA (acute kidney injury), Chronic kidney disease, stage 4 (severe) (H), PAF (paroxysmal atrial fibrillation) (H), Essential hypertension, Hyperlipidemia, unspecified hyperlipidemia type, Intellectual delay, Unable to care for self, Seborrheic dermatitis, Reactive thrombocytosis, Type 2 diabetes mellitus with stage 3 chronic kidney disease, with long-term current use of insulin, unspecified whether stage 3a or 3b CKD (H), Other acute pulmonary embolism without acute cor pulmonale (H), and Slow transit constipation were also pertinent to this visit.    Met with patient who denies any chest pain, palpitations, shortness of breath, RUBY, lightheadedness, dizziness, or cough. Denies any abdominal discomfort. Denies N&V. Denies B&B concerns. Denies dysuria or frequency. Denies loose or constipation. Appetite good. Sleeping well. No pain complaints. Pending county waiver assessment to assist with relocation to [facility] in [place]. SW involved.     BP Readings from Last 3 Encounters:   06/16/25 128/69   06/10/25 107/68   06/10/25  "112/77     Wt Readings from Last 5 Encounters:   06/16/25 83.1 kg (183 lb 3.2 oz)   06/10/25 83.1 kg (183 lb 3.2 oz)   06/09/25 83.1 kg (183 lb 3.2 oz)   06/04/25 83 kg (183 lb)   06/02/25 83 kg (183 lb)     Allergies, and PMH/PSH reviewed in EPIC today.  REVIEW OF SYSTEMS:  Limited secondary to cognitive impairment but today pt reports as noted per HPI    Objective:   /69   Pulse 71   Temp 99.1  F (37.3  C)   Resp 18   Ht 1.651 m (5' 5\")   Wt 83.1 kg (183 lb 3.2 oz)   SpO2 100%   BMI 30.49 kg/m    GENERAL APPEARANCE:  Alert, in no distress, cooperative  ENT:  Mouth and posterior oropharynx normal, moist mucous membranes, [place]  EYES:  EOM, conjunctivae, lids, pupils and irises normal  NECK:  No adenopathy,masses or thyromegaly  RESP:  respiratory effort and palpation of chest normal, lungs clear to auscultation , no respiratory distress  CV:  regular rate and rhythm, no murmur, rub, or gallop, peripheral edema 1+ in BLE  ABDOMEN:  normal bowel sounds, soft, nontender, no hepatosplenomegaly or other masses, no guarding or rebound  M/S:   ambulates with walker. Staff to assist as directed  SKIN:  Inspection of skin and subcutaneous tissue baseline, Palpation of skin and subcutaneous tissue baseline  NEURO:   Cranial nerves 2-12 are normal tested and grossly at patient's baseline, no purposeful movement in upper and lower extremities  PSYCH:  oriented X 3, memory impaired , affect and mood normal    Most Recent 3 CBC's:  Recent Labs   Lab Test 06/12/25 0925 05/29/25  0840 05/21/25  1152   WBC 10.0 9.4 9.3   HGB 13.1 13.2 12.3   MCV 91 93 91    356 382     Most Recent 3 BMP's:  Recent Labs   Lab Test 06/12/25  0925 05/29/25  0840 05/21/25  1152    138 134*   POTASSIUM 4.3 4.6 4.4   CHLORIDE 100 101 97*   CO2 23 26 23   BUN 34.4* 34.8* 41.4*   CR 1.43* 1.46* 1.56*   ANIONGAP 15 11 14   NORM 10.0 10.2 9.7   * 182* 218*     Most Recent 2 LFT's:  Recent Labs   Lab Test 05/29/25  0840 " 05/15/25  0850   AST 21 24   ALT 15 11   ALKPHOS 88 96   BILITOTAL 0.6 0.6     Most Recent Hemoglobin A1c:  Recent Labs   Lab Test 06/12/25  0925   A1C 8.0*     Most Recent Anemia Panel:  Recent Labs   Lab Test 06/12/25  0925 05/21/25  1152 05/15/25  0850 04/23/25  0623 04/21/25  0814 04/07/25  0542 04/06/25  0431 04/05/25  0451 04/19/23  1510 01/17/23  1505   WBC 10.0   < > 10.6   < > 9.7   < > 7.3 7.7   < > 10.1   HGB 13.1   < > 12.5   < > 11.2*   < > 10.1* 9.9*   < > 17.1*   HCT 41.4   < > 39.2   < > 34.8*   < > 31.2* 32.5*   < > 50.1*   MCV 91   < > 94   < > 96   < > 94 96   < > 88      < > 425   < > 650*   < > 282 292   < > 263   IRON  --   --   --   --   --   --   --  51  --   --    IRONSAT  --   --   --   --   --   --   --  27  --   --    RETICABSCT  --   --   --   --  0.116*  --   --   --   --   --    RETP  --   --   --   --  3.2*  --   --   --   --   --    FEB  --   --   --   --   --   --   --  188*  --   --    OLIVA  --   --   --   --   --   --   --  407*  --   --    B12  --   --  559  --   --   --   --  859   < >  --    FOLIC  --   --   --   --   --   --  4.9  --   --   --    EPOE  --   --   --   --   --   --   --   --   --  7    < > = values in this interval not displayed.       ASSESSMENT/PLAN:  Physical deconditioning  Generalized muscle weakness  Unable to care for self  Intellectual delay  Comment: Chronic. S/T prolonged hospitalization. Per therapy- Patient requires set up for UB needs, Supv for LB needs and toileting. Ambulates up to 200ft 4WW SBA. CPT 4.7/5.6. Transitioned to LTC on site pending relocation to [facility] in [facility]  Plan:  -SW to remain involved for safe discharge planning needs  -Recommend LTC/AVERY placement post TCU in [facility] where she wishes to be near family     Acute on Chronic Hypoxic Respiratory Failure  History of VAP  COPD  ADEEL  PE, Acute, segmental, subsegmental  Pulmonary emphysema  Comment: Acute on chronic. Admitted to [facility] on  3/22/25: She had a repeat CTA that demonstrated progression of her PE and RLL collapse concerning for mucous plugging and pneumonia. Primary team has been having issues w/ progressively increasing FiO2 needs. She had a sputum culture grow Candida & has been on fluconazole since 3/25. Transferred to [facility] d/t concerns that she might require a tracheostomy secondary to her inability to liberate from the vent. At [facility] patient was admitted to the ICU on a ventilator, successfully extubated 4/1 and downgraded to floor status 4/3. Infectious disease consulted and continued patient on Zosyn course to be completed through 4/17/2025. Patient was able to liberate from ventilator withOUT tracheostomy formation.   Plan:  -Monitor respiratory status  -Oxygen 3L via oxymask overnights.   -Encourage incentive spirometry every 4 hours while awake  -Continue apixaban 5mg BID  -Discontinue albuterol 0.083% neb and Start albuterol inhaler PRN instead  -Continue albuterol nebulization every 2 hour PRN  -Continue umeclidinium-vilanterol (anoro ellipta) 62.5-25mcg inhaler daily  -Trend labs periodically while on site     Perineal Abscess  Condyloma Acuminata  Comment: Acute on chronic. Per recent hospitalization-General surgery consulted for right keiry/perineal abscess and performed incision and drainage with Penrose drain placement 3/31 with subsequent repeat I&D 4/2 and 4/8. s/p acyclovir and I&D x3. Wound is now healed. All wound cares were discontinued  Plan:  -Monitor for worsening s/symptoms of concerns  -Air mattress while on site  -Follow up with wound clinic as directed PRN  -Monitor pain complaints  -Continue Tylenol PRN  -Trend labs periodically while on site     Seborrheic dermatitis:   Comment: Acute on chronic. Slowly improving to face with regimen  Plan:  -Monitor for worsening s/symptoms of concerns  -Dermatology PRN if symptoms worsen  -Continue triamcinolone cream to face BID x 2 weeks until  6/18/25  -Continue ketoconazole 2% shampoo- apply to scalp and body during showers     T2DM  Comment: Chronic. Poorly controlled as outpatient with A1c 12.7 in march 2025. Now recently 8% on 6/12/25. Used to be on insulin pump. No longer on it due to intellectual delay. Not able to complete own Blood Glucose monitoring along with insulin administration needs despite education  Plan:  -Continue Blood Glucose BID using Freestyle Bud 2 device. Change device every 14 days and PRN. Update provider if Blood Glucose<70 and.or >450  -Continue jardiance 25mg daily  -Trend labs periodically while on site  -Increase insulin glargine 40 units at HS. HOLD if Blood Glucose<120         Hemoglobin A1C   Date Value Ref Range Status   06/12/2025 8.0 (H) <5.7 % Final     Comment:     Normal <5.7%   Prediabetes 5.7-6.4%    Diabetes 6.5% or higher     Note: Adopted from ADA consensus guidelines.   05/10/2021 >14.0 (H) 0 - 5.6 % Final     Comment:     Normal <5.7% Prediabetes 5.7-6.4%  Diabetes 6.5% or higher - adopted from ADA   consensus guidelines.         CKD stage 4  YADIRA  Comment: Chronic. Baseline creatinine~1.1-1.3  Plan:  -Monitor urinary status  -Monitor for worsening s/symptoms of concerns  -Trend labs periodically while on site     Essential hypertension   Hyperlipidemia, unspecified hyperlipidemia type   Comment: Chronic. Based on JNC-8 goals,  patients age of 59 year old, presence of diabetes or CKD, and goals of care goal BP is  <140/90 mm Hg. Patient is stable with current plan of care and routine assessment..  Plan:  -Monitor BP and HR as directed  -Continue amlodipine 5mg daily. HOLD if SBP<100  -Continue atorvastatin 40mg daily  -Continue apixaban 5mg BID  -Trend labs periodically while on site         Reactive thrombocytosis  Comment: Chronic. Platelets historically in normal range (reviewed last 3 years). Peaked at 700,000, now resolved--Likely  reactive - reviewed flow chart for acute thrombocytosis, posted in  note. Normal peripheral smear, liver enzymes  Plan;  -Monitor bleeding risks  -Monitor for worsening s/symptoms of concerns  -Trend labs periodically while on site     Electronically signed by:  Dr. Jayshree Montenegro DNP, APRN, FNP-C, WCS-C, EDS-C     Provider reviewed records from facility, and interpreted most recent imaging/lab work, and vital signs.   Acute and chronic conditions managed by writer. Have been reviewed during today's exam

## 2025-06-16 ENCOUNTER — NURSING HOME VISIT (OUTPATIENT)
Dept: GERIATRICS | Facility: CLINIC | Age: 60
End: 2025-06-16
Payer: COMMERCIAL

## 2025-06-16 VITALS
SYSTOLIC BLOOD PRESSURE: 128 MMHG | OXYGEN SATURATION: 100 % | TEMPERATURE: 99.1 F | RESPIRATION RATE: 18 BRPM | HEART RATE: 71 BPM | HEIGHT: 65 IN | WEIGHT: 183.2 LBS | BODY MASS INDEX: 30.52 KG/M2 | DIASTOLIC BLOOD PRESSURE: 69 MMHG

## 2025-06-16 DIAGNOSIS — I26.99 OTHER ACUTE PULMONARY EMBOLISM WITHOUT ACUTE COR PULMONALE (H): ICD-10-CM

## 2025-06-16 DIAGNOSIS — D75.838 REACTIVE THROMBOCYTOSIS: ICD-10-CM

## 2025-06-16 DIAGNOSIS — E11.9 TYPE 2 DIABETES, HBA1C GOAL < 7% (H): ICD-10-CM

## 2025-06-16 DIAGNOSIS — T14.8XXA OPEN WOUND: ICD-10-CM

## 2025-06-16 DIAGNOSIS — G47.33 OSA (OBSTRUCTIVE SLEEP APNEA): ICD-10-CM

## 2025-06-16 DIAGNOSIS — L02.215 PERINEAL ABSCESS: ICD-10-CM

## 2025-06-16 DIAGNOSIS — N18.4 CHRONIC KIDNEY DISEASE, STAGE 4 (SEVERE) (H): ICD-10-CM

## 2025-06-16 DIAGNOSIS — F81.9 INTELLECTUAL DELAY: ICD-10-CM

## 2025-06-16 DIAGNOSIS — R53.81 PHYSICAL DECONDITIONING: Primary | ICD-10-CM

## 2025-06-16 DIAGNOSIS — E78.5 HYPERLIPIDEMIA, UNSPECIFIED HYPERLIPIDEMIA TYPE: ICD-10-CM

## 2025-06-16 DIAGNOSIS — A63.0 CONDYLOMA ACUMINATA: ICD-10-CM

## 2025-06-16 DIAGNOSIS — I48.0 PAF (PAROXYSMAL ATRIAL FIBRILLATION) (H): ICD-10-CM

## 2025-06-16 DIAGNOSIS — I26.99 ACUTE PULMONARY EMBOLISM, UNSPECIFIED PULMONARY EMBOLISM TYPE, UNSPECIFIED WHETHER ACUTE COR PULMONALE PRESENT (H): ICD-10-CM

## 2025-06-16 DIAGNOSIS — J96.22 ACUTE ON CHRONIC RESPIRATORY FAILURE WITH HYPOXIA AND HYPERCAPNIA (H): ICD-10-CM

## 2025-06-16 DIAGNOSIS — E08.42 DIABETIC POLYNEUROPATHY ASSOCIATED WITH DIABETES MELLITUS DUE TO UNDERLYING CONDITION (H): ICD-10-CM

## 2025-06-16 DIAGNOSIS — Z87.01 HISTORY OF PNEUMONIA: ICD-10-CM

## 2025-06-16 DIAGNOSIS — M62.81 GENERALIZED MUSCLE WEAKNESS: ICD-10-CM

## 2025-06-16 DIAGNOSIS — L21.9 SEBORRHEIC DERMATITIS: ICD-10-CM

## 2025-06-16 DIAGNOSIS — E11.22 TYPE 2 DIABETES MELLITUS WITH STAGE 3 CHRONIC KIDNEY DISEASE, WITH LONG-TERM CURRENT USE OF INSULIN, UNSPECIFIED WHETHER STAGE 3A OR 3B CKD (H): ICD-10-CM

## 2025-06-16 DIAGNOSIS — J96.21 ACUTE ON CHRONIC RESPIRATORY FAILURE WITH HYPOXIA AND HYPERCAPNIA (H): ICD-10-CM

## 2025-06-16 DIAGNOSIS — I10 ESSENTIAL HYPERTENSION: ICD-10-CM

## 2025-06-16 DIAGNOSIS — N17.9 AKI (ACUTE KIDNEY INJURY): ICD-10-CM

## 2025-06-16 DIAGNOSIS — J44.1 CHRONIC OBSTRUCTIVE PULMONARY DISEASE WITH ACUTE EXACERBATION (H): ICD-10-CM

## 2025-06-16 DIAGNOSIS — N18.30 TYPE 2 DIABETES MELLITUS WITH STAGE 3 CHRONIC KIDNEY DISEASE, WITH LONG-TERM CURRENT USE OF INSULIN, UNSPECIFIED WHETHER STAGE 3A OR 3B CKD (H): ICD-10-CM

## 2025-06-16 DIAGNOSIS — Z78.9 UNABLE TO CARE FOR SELF: ICD-10-CM

## 2025-06-16 DIAGNOSIS — K59.01 SLOW TRANSIT CONSTIPATION: ICD-10-CM

## 2025-06-16 DIAGNOSIS — J43.9 PULMONARY EMPHYSEMA, UNSPECIFIED EMPHYSEMA TYPE (H): ICD-10-CM

## 2025-06-16 DIAGNOSIS — Z79.4 TYPE 2 DIABETES MELLITUS WITH STAGE 3 CHRONIC KIDNEY DISEASE, WITH LONG-TERM CURRENT USE OF INSULIN, UNSPECIFIED WHETHER STAGE 3A OR 3B CKD (H): ICD-10-CM

## 2025-06-16 PROCEDURE — 99309 SBSQ NF CARE MODERATE MDM 30: CPT | Performed by: NURSE PRACTITIONER

## 2025-06-16 RX ORDER — ALBUTEROL SULFATE 90 UG/1
2 INHALANT RESPIRATORY (INHALATION) EVERY 4 HOURS PRN
Qty: 18 G | Refills: 0 | Status: SHIPPED | OUTPATIENT
Start: 2025-06-16

## 2025-06-16 RX ORDER — AMLODIPINE BESYLATE 5 MG/1
5 TABLET ORAL DAILY
Status: SHIPPED
Start: 2025-06-16

## 2025-06-16 NOTE — LETTER
6/16/2025      Marly Cannon  [address]  [place] [address]        [facility] GERIATRICS    Chief Complaint   Patient presents with     RECHECK     HPI:  Marly Cannon is a 59 year old  (1965), who is being seen today for an episodic care visit at: [facility] ([facility] & ) (NF) [[id number]]. Today's concern is: The primary encounter diagnosis was Physical deconditioning. Diagnoses of Generalized muscle weakness, Acute on chronic respiratory failure with hypoxia and hypercapnia (H), History of pneumonia, Chronic obstructive pulmonary disease with acute exacerbation (H), ADEEL (obstructive sleep apnea), Perineal abscess, Condyloma acuminata, Pulmonary emphysema, unspecified emphysema type (H), Acute pulmonary embolism, unspecified pulmonary embolism type, unspecified whether acute cor pulmonale present (H), Type 2 diabetes, HbA1c goal < 7% (H), Diabetic polyneuropathy associated with diabetes mellitus due to underlying condition (H), YADIRA (acute kidney injury), Chronic kidney disease, stage 4 (severe) (H), PAF (paroxysmal atrial fibrillation) (H), Essential hypertension, Hyperlipidemia, unspecified hyperlipidemia type, Intellectual delay, Unable to care for self, Seborrheic dermatitis, Reactive thrombocytosis, Type 2 diabetes mellitus with stage 3 chronic kidney disease, with long-term current use of insulin, unspecified whether stage 3a or 3b CKD (H), Other acute pulmonary embolism without acute cor pulmonale (H), and Slow transit constipation were also pertinent to this visit.    Met with patient who denies any chest pain, palpitations, shortness of breath, RUBY, lightheadedness, dizziness, or cough. Denies any abdominal discomfort. Denies N&V. Denies B&B concerns. Denies dysuria or frequency. Denies loose or constipation. Appetite good. Sleeping well. No pain complaints. Pending [facility] waiver assessment to assist with relocation to [facility] in [place]. SW involved.     BP  "Readings from Last 3 Encounters:   06/16/25 128/69   06/10/25 107/68   06/10/25 112/77     Wt Readings from Last 5 Encounters:   06/16/25 83.1 kg (183 lb 3.2 oz)   06/10/25 83.1 kg (183 lb 3.2 oz)   06/09/25 83.1 kg (183 lb 3.2 oz)   06/04/25 83 kg (183 lb)   06/02/25 83 kg (183 lb)     Allergies, and PMH/PSH reviewed in EPIC today.  REVIEW OF SYSTEMS:  Limited secondary to cognitive impairment but today pt reports as noted per HPI    Objective:   /69   Pulse 71   Temp 99.1  F (37.3  C)   Resp 18   Ht 1.651 m (5' 5\")   Wt 83.1 kg (183 lb 3.2 oz)   SpO2 100%   BMI 30.49 kg/m    GENERAL APPEARANCE:  Alert, in no distress, cooperative  ENT:  Mouth and posterior oropharynx normal, moist mucous membranes, [place]  EYES:  EOM, conjunctivae, lids, pupils and irises normal  NECK:  No adenopathy,masses or thyromegaly  RESP:  respiratory effort and palpation of chest normal, lungs clear to auscultation , no respiratory distress  CV:  regular rate and rhythm, no murmur, rub, or gallop, peripheral edema 1+ in BLE  ABDOMEN:  normal bowel sounds, soft, nontender, no hepatosplenomegaly or other masses, no guarding or rebound  M/S:   ambulates with walker. Staff to assist as directed  SKIN:  Inspection of skin and subcutaneous tissue baseline, Palpation of skin and subcutaneous tissue baseline  NEURO:   Cranial nerves 2-12 are normal tested and grossly at patient's baseline, no purposeful movement in upper and lower extremities  PSYCH:  oriented X 3, memory impaired , affect and mood normal    Most Recent 3 CBC's:  Recent Labs   Lab Test 06/12/25 0925 05/29/25  0840 05/21/25  1152   WBC 10.0 9.4 9.3   HGB 13.1 13.2 12.3   MCV 91 93 91    356 382     Most Recent 3 BMP's:  Recent Labs   Lab Test 06/12/25  0925 05/29/25  0840 05/21/25  1152    138 134*   POTASSIUM 4.3 4.6 4.4   CHLORIDE 100 101 97*   CO2 23 26 23   BUN 34.4* 34.8* 41.4*   CR 1.43* 1.46* 1.56*   ANIONGAP 15 11 14   NORM 10.0 10.2 9.7   * " 182* 218*     Most Recent 2 LFT's:  Recent Labs   Lab Test 05/29/25  0840 05/15/25  0850   AST 21 24   ALT 15 11   ALKPHOS 88 96   BILITOTAL 0.6 0.6     Most Recent Hemoglobin A1c:  Recent Labs   Lab Test 06/12/25  0925   A1C 8.0*     Most Recent Anemia Panel:  Recent Labs   Lab Test 06/12/25  0925 05/21/25  1152 05/15/25  0850 04/23/25  0623 04/21/25  0814 04/07/25  0542 04/06/25  0431 04/05/25  0451 04/19/23  1510 01/17/23  1505   WBC 10.0   < > 10.6   < > 9.7   < > 7.3 7.7   < > 10.1   HGB 13.1   < > 12.5   < > 11.2*   < > 10.1* 9.9*   < > 17.1*   HCT 41.4   < > 39.2   < > 34.8*   < > 31.2* 32.5*   < > 50.1*   MCV 91   < > 94   < > 96   < > 94 96   < > 88      < > 425   < > 650*   < > 282 292   < > 263   IRON  --   --   --   --   --   --   --  51  --   --    IRONSAT  --   --   --   --   --   --   --  27  --   --    RETICABSCT  --   --   --   --  0.116*  --   --   --   --   --    RETP  --   --   --   --  3.2*  --   --   --   --   --    FEB  --   --   --   --   --   --   --  188*  --   --    OLIVA  --   --   --   --   --   --   --  407*  --   --    B12  --   --  559  --   --   --   --  859   < >  --    FOLIC  --   --   --   --   --   --  4.9  --   --   --    EPOE  --   --   --   --   --   --   --   --   --  7    < > = values in this interval not displayed.       ASSESSMENT/PLAN:  Physical deconditioning  Generalized muscle weakness  Unable to care for self  Intellectual delay  Comment: Chronic. S/T prolonged hospitalization. Per therapy- Patient requires set up for UB needs, Supv for LB needs and toileting. Ambulates up to 200ft 4WW SBA. CPT 4.7/5.6. Transitioned to LTC on site pending relocation to [facility] in [facility]  Plan:  -SW to remain involved for safe discharge planning needs  -Recommend LTC/AVERY placement post TCU in [facility] where she wishes to be near family     Acute on Chronic Hypoxic Respiratory Failure  History of VAP  COPD  ADEEL  PE, Acute, segmental,  subsegmental  Pulmonary emphysema  Comment: Acute on chronic. Admitted to [facility] on 3/22/25: She had a repeat CTA that demonstrated progression of her PE and RLL collapse concerning for mucous plugging and pneumonia. Primary team has been having issues w/ progressively increasing FiO2 needs. She had a sputum culture grow Candida & has been on fluconazole since 3/25. Transferred to [facility] d/t concerns that she might require a tracheostomy secondary to her inability to liberate from the vent. At [facility] patient was admitted to the ICU on a ventilator, successfully extubated 4/1 and downgraded to floor status 4/3. Infectious disease consulted and continued patient on Zosyn course to be completed through 4/17/2025. Patient was able to liberate from ventilator withOUT tracheostomy formation.   Plan:  -Monitor respiratory status  -Oxygen 3L via oxymask overnights.   -Encourage incentive spirometry every 4 hours while awake  -Continue apixaban 5mg BID  -Discontinue albuterol 0.083% neb and Start albuterol inhaler PRN instead  -Continue albuterol nebulization every 2 hour PRN  -Continue umeclidinium-vilanterol (anoro ellipta) 62.5-25mcg inhaler daily  -Trend labs periodically while on site     Perineal Abscess  Condyloma Acuminata  Comment: Acute on chronic. Per recent hospitalization-General surgery consulted for right keiry/perineal abscess and performed incision and drainage with Penrose drain placement 3/31 with subsequent repeat I&D 4/2 and 4/8. s/p acyclovir and I&D x3. Wound is now healed. All wound cares were discontinued  Plan:  -Monitor for worsening s/symptoms of concerns  -Air mattress while on site  -Follow up with wound clinic as directed PRN  -Monitor pain complaints  -Continue Tylenol PRN  -Trend labs periodically while on site     Seborrheic dermatitis:   Comment: Acute on chronic. Slowly improving to face with regimen  Plan:  -Monitor for worsening s/symptoms of  concerns  -Dermatology PRN if symptoms worsen  -Continue triamcinolone cream to face BID x 2 weeks until 6/18/25  -Continue ketoconazole 2% shampoo- apply to scalp and body during showers     T2DM  Comment: Chronic. Poorly controlled as outpatient with A1c 12.7 in march 2025. Now recently 8% on 6/12/25. Used to be on insulin pump. No longer on it due to intellectual delay. Not able to complete own Blood Glucose monitoring along with insulin administration needs despite education  Plan:  -Continue Blood Glucose BID using Freestyle Bud 2 device. Change device every 14 days and PRN. Update provider if Blood Glucose<70 and.or >450  -Continue jardiance 25mg daily  -Trend labs periodically while on site  -Increase insulin glargine 40 units at HS. HOLD if Blood Glucose<120         Hemoglobin A1C   Date Value Ref Range Status   06/12/2025 8.0 (H) <5.7 % Final     Comment:     Normal <5.7%   Prediabetes 5.7-6.4%    Diabetes 6.5% or higher     Note: Adopted from ADA consensus guidelines.   05/10/2021 >14.0 (H) 0 - 5.6 % Final     Comment:     Normal <5.7% Prediabetes 5.7-6.4%  Diabetes 6.5% or higher - adopted from ADA   consensus guidelines.         CKD stage 4  YADIRA  Comment: Chronic. Baseline creatinine~1.1-1.3  Plan:  -Monitor urinary status  -Monitor for worsening s/symptoms of concerns  -Trend labs periodically while on site     Essential hypertension   Hyperlipidemia, unspecified hyperlipidemia type   Comment: Chronic. Based on JNC-8 goals,  patients age of 59 year old, presence of diabetes or CKD, and goals of care goal BP is  <140/90 mm Hg. Patient is stable with current plan of care and routine assessment..  Plan:  -Monitor BP and HR as directed  -Continue amlodipine 5mg daily. HOLD if SBP<100  -Continue atorvastatin 40mg daily  -Continue apixaban 5mg BID  -Trend labs periodically while on site         Reactive thrombocytosis  Comment: Chronic. Platelets historically in normal range (reviewed last 3 years). Peaked  at 700,000, now resolved--Likely  reactive - reviewed flow chart for acute thrombocytosis, posted in note. Normal peripheral smear, liver enzymes  Plan;  -Monitor bleeding risks  -Monitor for worsening s/symptoms of concerns  -Trend labs periodically while on site     Electronically signed by:  Dr. Jayshree Montenegro DNP, APRN, FNP-C, WCS-C, EDS-C     Provider reviewed records from facility, and interpreted most recent imaging/lab work, and vital signs.   Acute and chronic conditions managed by writer. Have been reviewed during today's exam      Sincerely,        Jayshree Montenegro, BLAIR CNP    Electronically signed

## 2025-06-18 NOTE — PROGRESS NOTES
[facility] GERIATRICS    Chief Complaint   Patient presents with    RECHECK     HPI:  Marly Cannon is a 59 year old  (1965), who is being seen today for an episodic care visit at: [facility] ([facility] & ) (NF) [[id number]]. Today's concern is: The primary encounter diagnosis was Physical deconditioning. Diagnoses of Generalized muscle weakness, Acute on chronic respiratory failure with hypoxia and hypercapnia (H), History of pneumonia, Chronic obstructive pulmonary disease with acute exacerbation (H), ADEEL (obstructive sleep apnea), Perineal abscess, Condyloma acuminata, Pulmonary emphysema, unspecified emphysema type (H), Acute pulmonary embolism, unspecified pulmonary embolism type, unspecified whether acute cor pulmonale present (H), Type 2 diabetes, HbA1c goal < 7% (H), Diabetic polyneuropathy associated with diabetes mellitus due to underlying condition (H), YADIRA (acute kidney injury), Chronic kidney disease, stage 4 (severe) (H), PAF (paroxysmal atrial fibrillation) (H), Essential hypertension, Hyperlipidemia, unspecified hyperlipidemia type, Intellectual delay, Unable to care for self, Seborrheic dermatitis, Reactive thrombocytosis, Type 2 diabetes mellitus with stage 3 chronic kidney disease, with long-term current use of insulin, unspecified whether stage 3a or 3b CKD (H), Other acute pulmonary embolism without acute cor pulmonale (H), and Slow transit constipation were also pertinent to this visit.    Met with patient who was found lying in bed. She denies any chest pain, palpitations, shortness of breath, RUBY, lightheadedness, dizziness, or cough. Denies any abdominal discomfort. Denies N&V. Denies B&B concerns. Denies dysuria or frequency. Denies loose or constipation. Appetite good. Sleeping well. She is happy that she was accepted into a halfway in [place]. She reports her sister is also going to be moving in there too which she is very happy about. [facility]  "waiver assessment at this time. She denies any pain complaints.     BP Readings from Last 3 Encounters:   06/19/25 129/79   06/16/25 128/69   06/10/25 107/68     Wt Readings from Last 5 Encounters:   06/19/25 83.3 kg (183 lb 9.6 oz)   06/16/25 83.1 kg (183 lb 3.2 oz)   06/10/25 83.1 kg (183 lb 3.2 oz)   06/09/25 83.1 kg (183 lb 3.2 oz)   06/04/25 83 kg (183 lb)     Allergies, and PMH/PSH reviewed in EPIC today.  REVIEW OF SYSTEMS:  Limited secondary to cognitive impairment but today pt reports as noted per HPI    Objective:   /79   Pulse 78   Temp 98.6  F (37  C)   Resp 18   Ht 1.651 m (5' 5\")   Wt 83.3 kg (183 lb 9.6 oz)   SpO2 100%   BMI 30.55 kg/m    GENERAL APPEARANCE:  Alert, in no distress, cooperative  ENT:  Mouth and posterior oropharynx normal, moist mucous membranes, [place]  EYES:  EOM, conjunctivae, lids, pupils and irises normal  NECK:  No adenopathy,masses or thyromegaly  RESP:  respiratory effort and palpation of chest normal, lungs clear to auscultation , no respiratory distress  CV:  regular rate and rhythm, no murmur, rub, or gallop, no edema, +2 pedal pulses  ABDOMEN:  normal bowel sounds, soft, nontender, no hepatosplenomegaly or other masses, no guarding or rebound  M/S:   Ambulates with walker.   SKIN:  Inspection of skin and subcutaneous tissue baseline, Palpation of skin and subcutaneous tissue baseline  NEURO:   Cranial nerves 2-12 are normal tested and grossly at patient's baseline, no purposeful movement in upper and lower extremities  PSYCH:  oriented X 3, memory impaired , affect and mood normal    Labs done in SNF are in [place] [facility]. Please refer to them using [employer]/Care Everywhere. and Recent labs in [facility] reviewed by me today.     ASSESSMENT/PLAN:  Physical deconditioning  Generalized muscle weakness  Unable to care for self  Intellectual delay  Comment: Chronic. S/T prolonged hospitalization. Per therapy- Patient requires set up for UB needs, Supv for LB needs and toileting. " Ambulates up to 200ft 4WW SBA. CPT 4.7/5.6. Transitioned to LTC on site pending relocation to [facility]  Plan:  -SW to remain involved for safe discharge planning needs  -She was accepted to [facility] in [place]. Pending [facility] assessment to be completed     Acute on Chronic Hypoxic Respiratory Failure  History of VAP  COPD  ADEEL  PE, Acute, segmental, subsegmental  Pulmonary emphysema  Comment: Acute on chronic. Admitted to [facility] on 3/22/25: She had a repeat CTA that demonstrated progression of her PE and RLL collapse concerning for mucous plugging and pneumonia. Primary team has been having issues w/ progressively increasing FiO2 needs. She had a sputum culture grow Candida & has been on fluconazole since 3/25. Transferred to  d/t concerns that she might require a tracheostomy secondary to her inability to liberate from the vent. At [facility] patient was admitted to the ICU on a ventilator, successfully extubated 4/1 and downgraded to floor status 4/3. Infectious disease consulted and continued patient on Zosyn course to be completed through 4/17/2025. Patient was able to liberate from ventilator withOUT tracheostomy formation.   Plan:  -Monitor respiratory status  -Oxygen 3L via oxymask overnights.   -Would recommend she follows up with sleep medicine in future  -Encourage incentive spirometry every 4 hours while awake  -Continue apixaban 5mg BID  -Continue albuterol inhaler PRN  -Continue umeclidinium-vilanterol (anoro ellipta) 62.5-25mcg inhaler daily  -Trend labs periodically while on site     Perineal Abscess  Condyloma Acuminata  Comment: Acute on chronic. Per recent hospitalization-General surgery consulted for right keiry/perineal abscess and performed incision and drainage with Penrose drain placement 3/31 with subsequent repeat I&D 4/2 and 4/8. s/p acyclovir and I&D x3. Wound is now healed. All wound cares were discontinued  Plan:  -Monitor  for worsening s/symptoms of concerns  -Discontinue air mattress due to no longer needed  -Follow up with wound clinic as directed PRN  -Monitor pain complaints  -Continue Tylenol PRN  -Trend labs periodically while on site     Seborrheic dermatitis:   Comment: Acute on chronic. Noted historically to face. Resolved with topical triamcinolone for 2 weeks.   Plan:  -Monitor for worsening s/symptoms of concerns  -Dermatology PRN if symptoms worsen  -Continue ketoconazole 2% shampoo- apply to scalp and body during showers     T2DM  Comment: Chronic. Poorly controlled as outpatient with A1c 12.7 in march 2025. Now recently 8% on 6/12/25. Used to be on insulin pump. No longer on it due to intellectual delay. Not able to complete own Blood Glucose monitoring along with insulin administration needs despite education  Plan:  -Continue Blood Glucose BID using Freestyle Bud 2 device. Change device every 14 days and PRN. Update provider if Blood Glucose<70 and.or >450  -Continue jardiance 25mg daily  -Trend labs periodically while on site  -Continue insulin glargine 40 units at HS. HOLD if Blood Glucose<120     CKD stage 4  YADIRA  Comment: Chronic. Baseline creatinine~1.1-1.3  Plan:  -Monitor urinary status  -Monitor for worsening s/symptoms of concerns  -Trend labs periodically while on site     Essential hypertension   Hyperlipidemia, unspecified hyperlipidemia type   Comment: Chronic. Based on JNC-8 goals,  patients age of 59 year old, presence of diabetes or CKD, and goals of care goal BP is  <140/90 mm Hg. Patient is stable with current plan of care and routine assessment..  Plan:  -Monitor BP and HR as directed  -Continue amlodipine 5mg daily. HOLD if SBP<100  -Continue atorvastatin 40mg daily  -Continue apixaban 5mg BID  -Trend labs periodically while on site     Reactive thrombocytosis  Comment: Chronic. Platelets historically in normal range (reviewed last 3 years). Peaked at 700,000, now resolved--Likely  reactive -  reviewed flow chart for acute thrombocytosis, posted in note. Normal peripheral smear, liver enzymes  Plan;  -Monitor bleeding risks  -Monitor for worsening s/symptoms of concerns  -Trend labs periodically while on site     Electronically signed by:  Dr. Jayshree Montenegro DNP, APRN, FNP-C, WCS-C, EDS-C     Provider reviewed records from facility, and interpreted most recent imaging/lab work, and vital signs.   Acute and chronic conditions managed by writer. Have been reviewed during today's exam

## 2025-06-19 ENCOUNTER — NURSING HOME VISIT (OUTPATIENT)
Dept: GERIATRICS | Facility: CLINIC | Age: 60
End: 2025-06-19
Payer: COMMERCIAL

## 2025-06-19 VITALS
BODY MASS INDEX: 30.59 KG/M2 | HEART RATE: 78 BPM | OXYGEN SATURATION: 100 % | DIASTOLIC BLOOD PRESSURE: 79 MMHG | TEMPERATURE: 98.6 F | SYSTOLIC BLOOD PRESSURE: 129 MMHG | WEIGHT: 183.6 LBS | HEIGHT: 65 IN | RESPIRATION RATE: 18 BRPM

## 2025-06-19 DIAGNOSIS — M62.81 GENERALIZED MUSCLE WEAKNESS: ICD-10-CM

## 2025-06-19 DIAGNOSIS — I26.99 ACUTE PULMONARY EMBOLISM, UNSPECIFIED PULMONARY EMBOLISM TYPE, UNSPECIFIED WHETHER ACUTE COR PULMONALE PRESENT (H): ICD-10-CM

## 2025-06-19 DIAGNOSIS — I10 ESSENTIAL HYPERTENSION: ICD-10-CM

## 2025-06-19 DIAGNOSIS — J96.21 ACUTE ON CHRONIC RESPIRATORY FAILURE WITH HYPOXIA AND HYPERCAPNIA (H): ICD-10-CM

## 2025-06-19 DIAGNOSIS — E11.9 TYPE 2 DIABETES, HBA1C GOAL < 7% (H): ICD-10-CM

## 2025-06-19 DIAGNOSIS — L21.9 SEBORRHEIC DERMATITIS: ICD-10-CM

## 2025-06-19 DIAGNOSIS — I26.99 OTHER ACUTE PULMONARY EMBOLISM WITHOUT ACUTE COR PULMONALE (H): ICD-10-CM

## 2025-06-19 DIAGNOSIS — J43.9 PULMONARY EMPHYSEMA, UNSPECIFIED EMPHYSEMA TYPE (H): ICD-10-CM

## 2025-06-19 DIAGNOSIS — Z78.9 UNABLE TO CARE FOR SELF: ICD-10-CM

## 2025-06-19 DIAGNOSIS — J44.1 CHRONIC OBSTRUCTIVE PULMONARY DISEASE WITH ACUTE EXACERBATION (H): ICD-10-CM

## 2025-06-19 DIAGNOSIS — D75.838 REACTIVE THROMBOCYTOSIS: ICD-10-CM

## 2025-06-19 DIAGNOSIS — I48.0 PAF (PAROXYSMAL ATRIAL FIBRILLATION) (H): ICD-10-CM

## 2025-06-19 DIAGNOSIS — A63.0 CONDYLOMA ACUMINATA: ICD-10-CM

## 2025-06-19 DIAGNOSIS — E11.22 TYPE 2 DIABETES MELLITUS WITH STAGE 3 CHRONIC KIDNEY DISEASE, WITH LONG-TERM CURRENT USE OF INSULIN, UNSPECIFIED WHETHER STAGE 3A OR 3B CKD (H): ICD-10-CM

## 2025-06-19 DIAGNOSIS — R53.81 PHYSICAL DECONDITIONING: Primary | ICD-10-CM

## 2025-06-19 DIAGNOSIS — Z87.01 HISTORY OF PNEUMONIA: ICD-10-CM

## 2025-06-19 DIAGNOSIS — L02.215 PERINEAL ABSCESS: ICD-10-CM

## 2025-06-19 DIAGNOSIS — G47.33 OSA (OBSTRUCTIVE SLEEP APNEA): ICD-10-CM

## 2025-06-19 DIAGNOSIS — N17.9 AKI (ACUTE KIDNEY INJURY): ICD-10-CM

## 2025-06-19 DIAGNOSIS — N18.30 TYPE 2 DIABETES MELLITUS WITH STAGE 3 CHRONIC KIDNEY DISEASE, WITH LONG-TERM CURRENT USE OF INSULIN, UNSPECIFIED WHETHER STAGE 3A OR 3B CKD (H): ICD-10-CM

## 2025-06-19 DIAGNOSIS — F81.9 INTELLECTUAL DELAY: ICD-10-CM

## 2025-06-19 DIAGNOSIS — N18.4 CHRONIC KIDNEY DISEASE, STAGE 4 (SEVERE) (H): ICD-10-CM

## 2025-06-19 DIAGNOSIS — E08.42 DIABETIC POLYNEUROPATHY ASSOCIATED WITH DIABETES MELLITUS DUE TO UNDERLYING CONDITION (H): ICD-10-CM

## 2025-06-19 DIAGNOSIS — Z79.4 TYPE 2 DIABETES MELLITUS WITH STAGE 3 CHRONIC KIDNEY DISEASE, WITH LONG-TERM CURRENT USE OF INSULIN, UNSPECIFIED WHETHER STAGE 3A OR 3B CKD (H): ICD-10-CM

## 2025-06-19 DIAGNOSIS — E78.5 HYPERLIPIDEMIA, UNSPECIFIED HYPERLIPIDEMIA TYPE: ICD-10-CM

## 2025-06-19 DIAGNOSIS — K59.01 SLOW TRANSIT CONSTIPATION: ICD-10-CM

## 2025-06-19 DIAGNOSIS — J96.22 ACUTE ON CHRONIC RESPIRATORY FAILURE WITH HYPOXIA AND HYPERCAPNIA (H): ICD-10-CM

## 2025-06-19 NOTE — LETTER
6/19/2025      Marly Cannon  [address]  [place] [address]        [facility]ERIATR[facility]    Chief Complaint   Patient presents with     RECHECK     HPI:  Marly Cannon is a 59 year old  (1965), who is being seen today for an episodic care visit at: [facility] ([facility] ) (NF) [[id number]]. Today's concern is: The primary encounter diagnosis was Physical deconditioning. Diagnoses of Generalized muscle weakness, Acute on chronic respiratory failure with hypoxia and hypercapnia (H), History of pneumonia, Chronic obstructive pulmonary disease with acute exacerbation (H), ADEEL (obstructive sleep apnea), Perineal abscess, Condyloma acuminata, Pulmonary emphysema, unspecified emphysema type (H), Acute pulmonary embolism, unspecified pulmonary embolism type, unspecified whether acute cor pulmonale present (H), Type 2 diabetes, HbA1c goal < 7% (H), Diabetic polyneuropathy associated with diabetes mellitus due to underlying condition (H), YADIRA (acute kidney injury), Chronic kidney disease, stage 4 (severe) (H), PAF (paroxysmal atrial fibrillation) (H), Essential hypertension, Hyperlipidemia, unspecified hyperlipidemia type, Intellectual delay, Unable to care for self, Seborrheic dermatitis, Reactive thrombocytosis, Type 2 diabetes mellitus with stage 3 chronic kidney disease, with long-term current use of insulin, unspecified whether stage 3a or 3b CKD (H), Other acute pulmonary embolism without acute cor pulmonale (H), and Slow transit constipation were also pertinent to this visit.    Met with patient who was found lying in bed. She denies any chest pain, palpitations, shortness of breath, RUBY, lightheadedness, dizziness, or cough. Denies any abdominal discomfort. Denies N&V. Denies B&B concerns. Denies dysuria or frequency. Denies loose or constipation. Appetite good. Sleeping well. She is happy that she was accepted into a [facility] in [place]. She reports her sister is also  "going to be moving in there too which she is very happy about. Pending [facility] waiver assessment at this time. She denies any pain complaints.     BP Readings from Last 3 Encounters:   06/19/25 129/79   06/16/25 128/69   06/10/25 107/68     Wt Readings from Last 5 Encounters:   06/19/25 83.3 kg (183 lb 9.6 oz)   06/16/25 83.1 kg (183 lb 3.2 oz)   06/10/25 83.1 kg (183 lb 3.2 oz)   06/09/25 83.1 kg (183 lb 3.2 oz)   06/04/25 83 kg (183 lb)     Allergies, and PMH/PSH reviewed in EPIC today.  REVIEW OF SYSTEMS:  Limited secondary to cognitive impairment but today pt reports as noted per HPI    Objective:   /79   Pulse 78   Temp 98.6  F (37  C)   Resp 18   Ht 1.651 m (5' 5\")   Wt 83.3 kg (183 lb 9.6 oz)   SpO2 100%   BMI 30.55 kg/m    GENERAL APPEARANCE:  Alert, in no distress, cooperative  ENT:  Mouth and posterior oropharynx normal, moist mucous membranes, [place]  EYES:  EOM, conjunctivae, lids, pupils and irises normal  NECK:  No adenopathy,masses or thyromegaly  RESP:  respiratory effort and palpation of chest normal, lungs clear to auscultation , no respiratory distress  CV:  regular rate and rhythm, no murmur, rub, or gallop, no edema, +2 pedal pulses  ABDOMEN:  normal bowel sounds, soft, nontender, no hepatosplenomegaly or other masses, no guarding or rebound  M/S:   Ambulates with walker.   SKIN:  Inspection of skin and subcutaneous tissue baseline, Palpation of skin and subcutaneous tissue baseline  NEURO:   Cranial nerves 2-12 are normal tested and grossly at patient's baseline, no purposeful movement in upper and lower extremities  PSYCH:  oriented X 3, memory impaired , affect and mood normal    Labs done in SNF are in [facility]. Please refer to them using EPIC/Care Everywhere. and Recent labs in EPIC reviewed by me today.     ASSESSMENT/PLAN:  Physical deconditioning  Generalized muscle weakness  Unable to care for self  Intellectual delay  Comment: Chronic. S/T prolonged hospitalization. Per " therapy- Patient requires set up for UB needs, Supv for LB needs and toileting. Ambulates up to 200ft 4WW SBA. CPT 4.7/5.6. Transitioned to LTC on site pending relocation to [facility] in [facility]  Plan:  -SW to remain involved for safe discharge planning needs  -She was accepted to [facility] in [place]. Pending [facility] assessment to be completed     Acute on Chronic Hypoxic Respiratory Failure  History of VAP  COPD  ADEEL  PE, Acute, segmental, subsegmental  Pulmonary emphysema  Comment: Acute on chronic. Admitted to [facility] on 3/22/25: She had a repeat CTA that demonstrated progression of her PE and RLL collapse concerning for mucous plugging and pneumonia. Primary team has been having issues w/ progressively increasing FiO2 needs. She had a sputum culture grow Candida & has been on fluconazole since 3/25. Transferred to [facility] d/t concerns that she might require a tracheostomy secondary to her inability to liberate from the vent. At [facility] patient was admitted to the ICU on a ventilator, successfully extubated 4/1 and downgraded to floor status 4/3. Infectious disease consulted and continued patient on Zosyn course to be completed through 4/17/2025. Patient was able to liberate from ventilator withOUT tracheostomy formation.   Plan:  -Monitor respiratory status  -Oxygen 3L via oxymask overnights.   -Would recommend she follows up with sleep medicine in future  -Encourage incentive spirometry every 4 hours while awake  -Continue apixaban 5mg BID  -Continue albuterol inhaler PRN  -Continue umeclidinium-vilanterol (anoro ellipta) 62.5-25mcg inhaler daily  -Trend labs periodically while on site     Perineal Abscess  Condyloma Acuminata  Comment: Acute on chronic. Per recent hospitalization-General surgery consulted for right keiry/perineal abscess and performed incision and drainage with Penrose drain placement 3/31 with subsequent repeat I&D 4/2 and 4/8. s/p acyclovir and  I&D x3. Wound is now healed. All wound cares were discontinued  Plan:  -Monitor for worsening s/symptoms of concerns  -Discontinue air mattress due to no longer needed  -Follow up with wound clinic as directed PRN  -Monitor pain complaints  -Continue Tylenol PRN  -Trend labs periodically while on site     Seborrheic dermatitis:   Comment: Acute on chronic. Noted historically to face. Resolved with topical triamcinolone for 2 weeks.   Plan:  -Monitor for worsening s/symptoms of concerns  -Dermatology PRN if symptoms worsen  -Continue ketoconazole 2% shampoo- apply to scalp and body during showers     T2DM  Comment: Chronic. Poorly controlled as outpatient with A1c 12.7 in march 2025. Now recently 8% on 6/12/25. Used to be on insulin pump. No longer on it due to intellectual delay. Not able to complete own Blood Glucose monitoring along with insulin administration needs despite education  Plan:  -Continue Blood Glucose BID using Freestyle Bud 2 device. Change device every 14 days and PRN. Update provider if Blood Glucose<70 and.or >450  -Continue jardiance 25mg daily  -Trend labs periodically while on site  -Continue insulin glargine 40 units at HS. HOLD if Blood Glucose<120     CKD stage 4  YADIRA  Comment: Chronic. Baseline creatinine~1.1-1.3  Plan:  -Monitor urinary status  -Monitor for worsening s/symptoms of concerns  -Trend labs periodically while on site     Essential hypertension   Hyperlipidemia, unspecified hyperlipidemia type   Comment: Chronic. Based on JNC-8 goals,  patients age of 59 year old, presence of diabetes or CKD, and goals of care goal BP is  <140/90 mm Hg. Patient is stable with current plan of care and routine assessment..  Plan:  -Monitor BP and HR as directed  -Continue amlodipine 5mg daily. HOLD if SBP<100  -Continue atorvastatin 40mg daily  -Continue apixaban 5mg BID  -Trend labs periodically while on site     Reactive thrombocytosis  Comment: Chronic. Platelets historically in normal range  (reviewed last 3 years). Peaked at 700,000, now resolved--Likely  reactive - reviewed flow chart for acute thrombocytosis, posted in note. Normal peripheral smear, liver enzymes  Plan;  -Monitor bleeding risks  -Monitor for worsening s/symptoms of concerns  -Trend labs periodically while on site     Electronically signed by:  Dr. Jayshree Montenegro DNP, APRN, FNP-C, WCS-C, EDS-C     Provider reviewed records from facility, and interpreted most recent imaging/lab work, and vital signs.   Acute and chronic conditions managed by writer. Have been reviewed during today's exam        Sincerely,        Jayshree Montenegro, BLAIR CNP    Electronically signed

## 2025-07-09 ENCOUNTER — NURSING HOME VISIT (OUTPATIENT)
Dept: GERIATRICS | Facility: CLINIC | Age: 60
End: 2025-07-09
Payer: COMMERCIAL

## 2025-07-09 ENCOUNTER — PATIENT OUTREACH (OUTPATIENT)
Dept: GASTROENTEROLOGY | Facility: CLINIC | Age: 60
End: 2025-07-09

## 2025-07-09 VITALS
OXYGEN SATURATION: 99 % | HEART RATE: 70 BPM | SYSTOLIC BLOOD PRESSURE: 113 MMHG | DIASTOLIC BLOOD PRESSURE: 58 MMHG | TEMPERATURE: 98.1 F | RESPIRATION RATE: 18 BRPM | WEIGHT: 182.4 LBS | HEIGHT: 65 IN | BODY MASS INDEX: 30.39 KG/M2

## 2025-07-09 DIAGNOSIS — Z87.01 HISTORY OF PNEUMONIA: ICD-10-CM

## 2025-07-09 DIAGNOSIS — I48.0 PAF (PAROXYSMAL ATRIAL FIBRILLATION) (H): Primary | ICD-10-CM

## 2025-07-09 DIAGNOSIS — E11.65 TYPE 2 DIABETES MELLITUS WITH HYPERGLYCEMIA, WITH LONG-TERM CURRENT USE OF INSULIN (H): ICD-10-CM

## 2025-07-09 DIAGNOSIS — L21.9 SEBORRHEIC DERMATITIS: ICD-10-CM

## 2025-07-09 DIAGNOSIS — N18.30 STAGE 3 CHRONIC KIDNEY DISEASE, UNSPECIFIED WHETHER STAGE 3A OR 3B CKD (H): ICD-10-CM

## 2025-07-09 DIAGNOSIS — Z79.4 TYPE 2 DIABETES MELLITUS WITH HYPERGLYCEMIA, WITH LONG-TERM CURRENT USE OF INSULIN (H): ICD-10-CM

## 2025-07-09 DIAGNOSIS — I10 ESSENTIAL HYPERTENSION: ICD-10-CM

## 2025-07-09 DIAGNOSIS — F81.9 INTELLECTUAL DELAY: ICD-10-CM

## 2025-07-09 DIAGNOSIS — J43.9 PULMONARY EMPHYSEMA, UNSPECIFIED EMPHYSEMA TYPE (H): ICD-10-CM

## 2025-07-09 NOTE — PROGRESS NOTES
[facility]  PHYSICIAN NOTE    Chief Complaint   Patient presents with    longterm Regulatory       HPI:    Marly Cannon is a 59 year old  (1965), who is being seen today for a federally mandated E/M visit at [facility]. ***      Recent vitals: Afebrile, -140/60-80, HR 60-90 and weight stable in 180s#.    Marly is seen in her room and welcomes a visit.  She has been very active with onsite activities here at the facility and previously attended a music event and will be also going outside with a group later on this afternoon.  She feels well and denies pain or dyspnea.  Has no acute medical concerns.  Feels she is eating well with dinner being her biggest meal of the day.  We talked about her blood sugars being very well-controlled in the morning often 100-200 range but they do spike to 300-400 range later in the day.  She does have a janice but it kept falling off so the next 1 the staff receives they are going to try to reinforce with some medical tape.  She would be open to further insulin adjustments including potentially short acting insulin but we also have to keep in mind depending transition to a new assisted living facility in [place] and what they can accommodate for her.  No longer able to be on an insulin pump due to her cognitive delay.  Also has comorbid CKD.  Does take Jardiance.  Says her skin is well-healed in the groin.  She does have a rash consistent with likely seborrheic dermatitis versus psoriasis on her face but she finds it is not bothersome to her at all.  She is so excited to move to the assisted living facility where her sister and her cousin both live as well.  Says there is a volunteer ride service that will help get her there when the MA goes through.      Past Medical History:   Diagnosis Date    ACP (advance care planning) 09/09/2016    Advance Care Planning 9/9/2016: ACP Review of Chart / Resources Provided:  Reviewed chart for advance  care plan.  Marly Cannon has been provided information and resources to begin or update their advance care plan.  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       H/O colonoscopy 04/24/2019    Had in 10/2018, due for repeat 5 years      Hyperglycemia 03/22/2025    Hyperlipidemia, unspecified hyperlipidemia type 05/28/2019    Hyperparathyroidism due to renal insufficiency 06/14/2016    Hypertension     Intellectual disability 08/01/2017    Memory disturbance 02/13/2020    Microalbuminuria due to type 2 diabetes mellitus (H) 06/14/2016    ADEEL (obstructive sleep apnea) 02/16/2021    Interp for PSG dated 12/16/2020.     Weight 202 lbs.  Total sleep time 417.5 minutes, sleep efficiency 83%, sleep latency 15 minutes, REM latency - minutes.  Arousal index 45.6.  Sleep architecture was incredibly fragmented with no clear REM observed.     AHI 43.1, events appearing primarily obstructive in nature.  Mean Spo2 86%, isabel SpO2 78%, 96.2% of recording was <= 89%.       EKG appeared NS    PAF (paroxysmal atrial fibrillation) (H) 03/22/2025    Pneumonia 03/22/2025    Pulmonary emphysema (H) 08/21/2015    Confirmed via PFTs in 8/2015      Stage 3 chronic kidney disease (H) 02/12/2016    Overview:   Updated per 10/1/17 IMO import      Tobacco abuse 07/09/2015    Tremor 11/09/2009    Formatting of this note might be different from the original.   Shakes head      Tubular adenoma of colon 09/28/2018    Type 2 diabetes, HbA1c goal < 7% (H) 07/24/2015    Unable to care for self 03/18/2025    Urinary incontinence, unspecified type 01/17/2023    Vitamin D deficiency 06/14/2016        CODE STATUS: FULL    ALLERGIES: Patient has no known allergies.    MEDICATIONS: Reviewed and updated in Epic according to facility MAR  Current Outpatient Medications   Medication Sig Dispense Refill    acetaminophen (TYLENOL) 325 MG tablet Take 2 tablets (650 mg) by mouth every 6 hours as needed for mild pain or fever.      albuterol (PROAIR HFA/PROVENTIL HFA/VENTOLIN HFA) 108 (90 Base) MCG/ACT inhaler Inhale 2 puffs into the lungs every 4 hours as needed for cough, wheezing or shortness of breath. 18 g 0    amLODIPine (NORVASC) 5 MG tablet Take 1 tablet (5 mg) by mouth daily.      apixaban ANTICOAGULANT (ELIQUIS) 5 MG tablet Take 1 tablet (5 mg) by mouth 2 times daily.      atorvastatin (LIPITOR) 40 MG tablet TAKE 1 TABLET BY MOUTH AT BEDTIME 90 tablet 2    Cholecalciferol (VITAMIN D3) 50 MCG (2000 UT) CAPS TAKE 1 CAPSULE BY MOUTH DAILY 90 capsule 0    clotrimazole (LOTRIMIN) 1 % external cream Apply topically daily. 30 g 2    Continuous Glucose Sensor (FREESTYLE HANK 2 SENSOR) MISC 1 each every 14 days. Use 1 sensor every 14 days. Use to read blood sugars per 's instructions. 6 each 5    empagliflozin (JARDIANCE) 25 MG TABS tablet Take 1 tablet (25 mg) by mouth daily 90 tablet 3    insulin glargine (LANTUS PEN) 100 UNIT/ML pen Inject 40 Units subcutaneously at bedtime. HOLD if Blood Glucose<120      ketoconazole (NIZORAL) 2 % external shampoo Apply to scalp and body on shower days 120 mL 11    multivitamin w/minerals (THERA-VIT-M) tablet Take 1 tablet by mouth daily.      ondansetron (ZOFRAN ODT) 4 MG ODT tab Take 1 tablet (4 mg) by mouth every 6 hours as needed.      polyethylene glycol (MIRALAX) 17 g packet Take 17 g by mouth daily as needed for constipation.       "sennosides (SENOKOT) 8.6 MG tablet Take 1 tablet by mouth 2 times daily as needed for constipation.      umeclidinium-vilanterol (ANORO ELLIPTA) 62.5-25 MCG/ACT oral inhaler Inhale 1 puff into the lungs daily.         ROS:  4 point ROS including Respiratory, CV, GI and , other than that noted in the HPI,  is negative    Exam:  /58   Pulse 70   Temp 98.1  F (36.7  C)   Resp 18   Ht 1.651 m (5' 5\")   Wt 82.7 kg (182 lb 6.4 oz)   SpO2 99%   BMI 30.35 kg/m    Alert, pleasant, casually dressed, sitting up in a wheelchair that she is able to self propel short distances  Flaky rash on her forehead and cheeks bilaterally without open lesions  Moist oral mucosa  Heart tones regular with occasional ectopic beat versus paroxysmal A-fib noted without murmur rub or gallop  Breathing unlabored on room air, no cough, clear posteriorly  Mood upbeat and cheerful  No tremor    Lab/Diagnostic Data:    Most Recent 3 CBC's:  Recent Labs   Lab Test 06/12/25  0925 05/29/25  0840 05/21/25  1152   WBC 10.0 9.4 9.3   HGB 13.1 13.2 12.3   MCV 91 93 91    356 382     Most Recent 3 BMP's:  Recent Labs   Lab Test 06/12/25  0925 05/29/25  0840 05/21/25  1152    138 134*   POTASSIUM 4.3 4.6 4.4   CHLORIDE 100 101 97*   CO2 23 26 23   BUN 34.4* 34.8* 41.4*   CR 1.43* 1.46* 1.56*   ANIONGAP 15 11 14   NORM 10.0 10.2 9.7   * 182* 218*   Estimated Creatinine Clearance: 43.9 mL/min (A) (based on SCr of 1.43 mg/dL (H)).   Most Recent 2 LFT's:  Recent Labs   Lab Test 05/29/25  0840 05/15/25  0850   AST 21 24   ALT 15 11   ALKPHOS 88 96   BILITOTAL 0.6 0.6     Most Recent TSH and T4:  Recent Labs   Lab Test 03/18/25  1209   TSH 1.18     Lab Results   Component Value Date    A1C 8.0 06/12/2025    A1C 12.7 03/22/2025    A1C 9.6 12/05/2024    A1C 9.7 05/16/2024    A1C 10.2 02/16/2024    A1C >14.0 05/10/2021    A1C 10.7 02/10/2021    A1C 7.6 11/09/2020    A1C 7.9 08/07/2020    A1C 8.1 05/04/2020       ASSESSMENT/PLAN:  PAF " (paroxysmal atrial fibrillation) (H)  Rate controlled  Continues on Eliquis therapy    Essential hypertension  Stage 3 chronic kidney disease, unspecified whether stage 3a or 3b CKD (H)  Vital signs excellent  Follow chronic kidney disease    Type 2 diabetes mellitus with hyperglycemia, with long-term current use of insulin (H)  A1c trend as noted above has improved dramatically  However she does get some hyperglycemia later in the day  Continues currently on Lantus 40 units every evening as well as Jardiance 25 mg daily  Blood sugars are often well-controlled 100-200 range in the morning and then later in the day when checked 300-400 range  This would indicate she may benefit from some short acting mealtime insulin to help with those higher spikes later in the day related to carb intake  However, I will check with my colleagues to see what the capacity is of her new assisted living facility and what they can manage in terms of insulin timing***  Plans to reinforce her next Bud with medical tape so it doesn't keep falling off    Pulmonary emphysema, unspecified emphysema type (H)  History of pneumonia  Lungs clear today on exam without respiratory complaints  Continues on her Anoro Ellipta inhaler    Seborrheic dermatitis  Noted on her face but she is not bothered by it  Currently has ketoconazole shampoo to help with flakiness on shower days  Consider moisturizing cream or a gentle steroid ointment if progressive  Monitor for infection    Intellectual delay   Glad she is engaged in facility activities and fries with the support  Also glad she feels comfortable with the plan to transition to a new assisted living facility in [place] where she lives with her sister and her cousin once her MA goes through          Electronically signed by:  Lexus Sims DO

## 2025-07-11 ENCOUNTER — MEDICAL CORRESPONDENCE (OUTPATIENT)
Dept: HEALTH INFORMATION MANAGEMENT | Facility: CLINIC | Age: 60
End: 2025-07-11
Payer: COMMERCIAL

## 2025-07-11 PROBLEM — Z86.711 HISTORY OF PULMONARY EMBOLISM: Status: ACTIVE | Noted: 2025-07-11

## 2025-07-15 NOTE — PROGRESS NOTES
[facility] GERIATRICS    Chief Complaint   Patient presents with    RECHECK     HPI:  Marly Cannon is a 59 year old  (1965), who is being seen today for an episodic care visit at: [facility] ([facility] & ) (NF) [[id number]]. Today's concern is: The primary encounter diagnosis was Reactive thrombocytosis. Diagnoses of Hyperlipidemia, unspecified hyperlipidemia type, Essential hypertension, YADIRA (acute kidney injury), Stage 3 chronic kidney disease, unspecified whether stage 3a or 3b CKD (H), Type 2 diabetes mellitus with hyperglycemia, with long-term current use of insulin (H), Seborrheic dermatitis, Intellectual delay, Cognitive impairment, Physical deconditioning, Generalized muscle weakness, Acute on chronic respiratory failure with hypoxia and hypercapnia (H), Chronic obstructive pulmonary disease with acute exacerbation (H), ADEEL (obstructive sleep apnea), Perineal abscess, Condyloma acuminata, Acute pulmonary embolism, unspecified pulmonary embolism type, unspecified whether acute cor pulmonale present (H), Diabetic polyneuropathy associated with diabetes mellitus due to underlying condition (H), History of pulmonary embolism, PAF (paroxysmal atrial fibrillation) (H), History of pneumonia, Pulmonary emphysema, unspecified emphysema type (H), Unable to care for self, and Urinary and fecal incontinence were also pertinent to this visit.    Met with patient who denies any chest pain, palpitations, shortness of breath, RUBY, lightheadedness, dizziness, or cough. Denies any abdominal discomfort. Denies N&V. Denies B&B concerns. Denies dysuria or frequency. Denies loose or constipation. Appetite good. Sleeping well. Compliant with oxygen at night. Acute flare of dermatitis noted to face. Denies any pain complaints    County finally assessed patient for discharge plans to relocate to [facility] in [place], [place]. Pending further discharge orders at this time. She reports is very excited to be  "moving near her family.     BP Readings from Last 3 Encounters:   07/16/25 131/80   07/09/25 113/58   06/19/25 129/79     Wt Readings from Last 5 Encounters:   07/16/25 83.6 kg (184 lb 3.2 oz)   07/09/25 82.7 kg (182 lb 6.4 oz)   06/19/25 83.3 kg (183 lb 9.6 oz)   06/16/25 83.1 kg (183 lb 3.2 oz)   06/10/25 83.1 kg (183 lb 3.2 oz)     Allergies, and PMH/PSH reviewed in EPIC today.  REVIEW OF SYSTEMS:  4 point ROS including Respiratory, CV, GI and , other than that noted in the HPI,  is negative    Objective:   /80   Pulse 72   Temp 98.1  F (36.7  C)   Resp 18   Ht 1.651 m (5' 5\")   Wt 83.6 kg (184 lb 3.2 oz)   SpO2 98%   BMI 30.65 kg/m    GENERAL APPEARANCE:  Alert, in no distress, cooperative  ENT:  Mouth and posterior oropharynx normal, moist mucous membranes, [place]  EYES:  EOM, conjunctivae, lids, pupils and irises normal  NECK:  No adenopathy,masses or thyromegaly  RESP:  respiratory effort and palpation of chest normal, lungs clear to auscultation , no respiratory distress  CV:  regular rate and rhythm, no murmur, rub, or gallop, peripheral edema 1+ in BLE  ABDOMEN:  normal bowel sounds, soft, nontender, no hepatosplenomegaly or other masses, no guarding or rebound  M/S:   Ambulates with walker  SKIN:  Inspection of skin and subcutaneous tissue baseline, reddened dermatitis noted to central face  NEURO:   Cranial nerves 2-12 are normal tested and grossly at patient's baseline, no purposeful movement in upper and lower extremities  PSYCH:  oriented X 3, memory impaired , affect and mood normal    Most Recent 3 CBC's:  Recent Labs   Lab Test 06/12/25 0925 05/29/25  0840 05/21/25  1152   WBC 10.0 9.4 9.3   HGB 13.1 13.2 12.3   MCV 91 93 91    356 382     Most Recent 3 BMP's:  Recent Labs   Lab Test 06/12/25  0925 05/29/25  0840 05/21/25  1152    138 134*   POTASSIUM 4.3 4.6 4.4   CHLORIDE 100 101 97*   CO2 23 26 23   BUN 34.4* 34.8* 41.4*   CR 1.43* 1.46* 1.56*   ANIONGAP 15 11 14   NORM " 10.0 10.2 9.7   * 182* 218*     Most Recent 2 LFT's:  Recent Labs   Lab Test 05/29/25  0840 05/15/25  0850   AST 21 24   ALT 15 11   ALKPHOS 88 96   BILITOTAL 0.6 0.6     Most Recent Hemoglobin A1c:  Recent Labs   Lab Test 06/12/25  0925   A1C 8.0*     Most Recent Anemia Panel:  Recent Labs   Lab Test 06/12/25  0925 05/21/25  1152 05/15/25  0850 04/23/25  0623 04/21/25  0814 04/07/25  0542 04/06/25  0431 04/05/25  0451 04/19/23  1510 01/17/23  1505   WBC 10.0   < > 10.6   < > 9.7   < > 7.3 7.7   < > 10.1   HGB 13.1   < > 12.5   < > 11.2*   < > 10.1* 9.9*   < > 17.1*   HCT 41.4   < > 39.2   < > 34.8*   < > 31.2* 32.5*   < > 50.1*   MCV 91   < > 94   < > 96   < > 94 96   < > 88      < > 425   < > 650*   < > 282 292   < > 263   IRON  --   --   --   --   --   --   --  51  --   --    IRONSAT  --   --   --   --   --   --   --  27  --   --    RETICABSCT  --   --   --   --  0.116*  --   --   --   --   --    RETP  --   --   --   --  3.2*  --   --   --   --   --    FEB  --   --   --   --   --   --   --  188*  --   --    OLIVA  --   --   --   --   --   --   --  407*  --   --    B12  --   --  559  --   --   --   --  859   < >  --    FOLIC  --   --   --   --   --   --  4.9  --   --   --    EPOE  --   --   --   --   --   --   --   --   --  7    < > = values in this interval not displayed.       ASSESSMENT/PLAN:  Physical deconditioning  Generalized muscle weakness  Unable to care for self  Intellectual delay  Comment: Chronic. S/T prolonged hospitalization. Per therapy- Patient requires set up for UB needs, Supv for LB needs and toileting. Ambulates up to 200ft 4WW SBA. CPT 4.7/5.6. Transitioned to LTC on site pending relocation to [facility] in [facility] [place]  Plan:  -SW to remain involved for safe discharge planning needs  -She was accepted to [facility] in [place].   -Will be discharging soon with services in place.      Acute on Chronic Hypoxic Respiratory Failure  History of VAP  COPD  ADEEL  PE, Acute,  segmental, subsegmental  Pulmonary emphysema  Comment: Acute on chronic. Admitted to [facility] on 3/22/25: She had a repeat CTA that demonstrated progression of her PE and RLL collapse concerning for mucous plugging and pneumonia. Primary team has been having issues w/ progressively increasing FiO2 needs. She had a sputum culture grow Candida & has been on fluconazole since 3/25. Transferred to [facility] d/t concerns that she might require a tracheostomy secondary to her inability to liberate from the vent. At [facility] patient was admitted to the ICU on a ventilator, successfully extubated 4/1 and downgraded to floor status 4/3. Infectious disease consulted and continued patient on Zosyn course to be completed through 4/17/2025. Patient was able to liberate from ventilator withOUT tracheostomy formation.   Plan:  -Monitor respiratory status  -Oxygen 3L via oxymask overnights.   -Would recommend she follows up with sleep medicine in future  -Encourage incentive spirometry every 4 hours while awake  -Continue apixaban 5mg BID  -Continue albuterol inhaler PRN  -Continue umeclidinium-vilanterol (anoro ellipta) 62.5-25mcg inhaler daily  -CMP and CBC due 7/18/25     Perineal Abscess  Condyloma Acuminata  Comment: Acute on chronic. Per recent hospitalization-General surgery consulted for right keiry/perineal abscess and performed incision and drainage with Penrose drain placement 3/31 with subsequent repeat I&D 4/2 and 4/8. s/p acyclovir and I&D x3. Wound is now healed. All wound cares were discontinued  Plan:  -Monitor for worsening s/symptoms of concerns  -Follow up with wound clinic as directed PRN  -Monitor pain complaints  -Continue Tylenol PRN  -CMP and CBC due 7/18/25     Seborrheic dermatitis:   Comment: Acute on chronic. Dermatitis acute flare noted to central face.   Plan:  -Monitor for worsening s/symptoms of concerns  -Dermatology PRN if symptoms worsen  -Restart triamcinolone BID to  face until skin resolves.   -Continue ketoconazole 2% shampoo- apply to scalp and body during showers     T2DM  Comment: Chronic. Poorly controlled as outpatient with A1c 12.7 in march 2025. Now recently 8% on 6/12/25. Used to be on insulin pump. No longer on it due to intellectual delay. Not able to complete own Blood Glucose monitoring along with insulin administration needs despite education, therefore [facility] facility staff will need to perform for her  Plan:  -Continue Blood Glucose QID using Freestyle Bud 2 device. Change device every 14 days and PRN. Update provider if Blood Glucose<70 and.or >450  -Continue jardiance 25mg daily  -Continue insulin aspart sliding scale TID with meals as directed  -Continue insulin glargine 40 units at HS. HOLD if Blood Glucose<120  -CMP and CBC due 7/18/25     CKD stage 3a or 3b  YADIRA  Comment: Chronic. Baseline creatinine~1.1-1.3  Plan:  -Monitor urinary status  -Monitor for worsening s/symptoms of concerns  -CMP and CBC due 7/18/25     Essential hypertension   Hyperlipidemia, unspecified hyperlipidemia type   Comment: Chronic. Based on JNC-8 goals,  patients age of 59 year old, presence of diabetes or CKD, and goals of care goal BP is  <140/90 mm Hg. Patient is stable with current plan of care and routine assessment..  Plan:  -Monitor BP and HR as directed  -Continue amlodipine 5mg daily. HOLD if SBP<100  -Continue atorvastatin 40mg daily  -Continue apixaban 5mg BID  -CMP and CBC due 7/18/25     Reactive thrombocytosis  Comment: Chronic. Platelets historically in normal range (reviewed last 3 years). Peaked at 700,000, now resolved--Likely  reactive - reviewed flow chart for acute thrombocytosis, posted in note. Normal peripheral smear, liver enzymes  Plan;  -Monitor bleeding risks  -Monitor for worsening s/symptoms of concerns  -CMP and CBC due 7/18/25     45 minutes spent on the date of the encounter doing chart review, history and exam, PMH, documentation and further  activities as noted above. Collaboration with nursing staff on site, clinical managers, and therapies were completed on site today.     Electronically signed by:  Dr. Jayshree Montenegro DNP, APRN, FNP-C, WCS-C, EDS-C     Provider reviewed records from facility, and interpreted most recent imaging/lab work, and vital signs.   Acute and chronic conditions managed by writer. Have been reviewed during today's exam       Face to Face and Medical Necessity Statement for DME Provider visit    Demographic Information on Marly Cannon:  Gender: female  : 1965 [address]  [place] [address]  [phone] (home)     Medical Record: [id number]  Social Security Number: [id number]  Primary Care Provider: Jayshree Montenegro  Insurance: Payor: [facility] / Plan: [facility] DUAL / Product Type: HMO /     HPI:   Marly Cannon is a 59 year old  (1965), who is being seen today for a face to face provider visit at [facility]; medical necessity statement for DME included. This patient requires the following:  DME Ordered and Medical Necessity Statement     Rollator--Rollator Not Specific Color --Xcad Attachment   The patient has a mobility limitation of BLE  that significantly impairs her ability to participate in one or more mobility-related activities of daily living in the home. The patient is able to safely use the walker and the functional mobility deficit can be sufficiently resolved with the use of a Rollator.    2. Adult Pull-Ups--Adult Pull-Ups   Disposable Liners   Refills - 5  Changes per day - 4  Supply Period - 30 days    Patient has urinary and fecal incontinence and will benefit from obtaining adult pull ups.     Oxygen 3 Lpm by Oxymask overnights while sleeping nocturnal ; Clinical Documentation: (Obtain documented RA sat with in 2 days of discharge in acute setting or within 30 days of provider visit):  Method 1: Room air at rest: Qualifing sat level is < 88% or below on room air at rest on  7/16/25 date.     Oxygen Therapy   SpO2 92 %   O2 Device Nasal cannula with humidification   Oxygen Delivery (S)  2 LPM   Assessment   Respiratory WDL WDL   Rhythm/Pattern, Respiratory pattern regular   Expansion/Accessory Muscles/Retractions no use of accessory muscles   Airway Safety Measures all equipment/monitors on and audible      Patient started on RA, desaturated to 87%. 2 L O2 added to maintain SPO2 in 90's.    Pt needing above DME with expected length of need of 99 year due to medical necessity associated with following diagnosis:     Reactive thrombocytosis  Hyperlipidemia, unspecified hyperlipidemia type  Essential hypertension  YADIRA (acute kidney injury)  Stage 3 chronic kidney disease, unspecified whether stage 3a or 3b CKD (H)  Type 2 diabetes mellitus with hyperglycemia, with long-term current use of insulin (H)  Seborrheic dermatitis  Intellectual delay  Cognitive impairment  Physical deconditioning  Generalized muscle weakness  Acute on chronic respiratory failure with hypoxia and hypercapnia (H)  Chronic obstructive pulmonary disease with acute exacerbation (H)  ADEEL (obstructive sleep apnea)  Perineal abscess  Condyloma acuminata  Acute pulmonary embolism, unspecified pulmonary embolism type, unspecified whether acute cor pulmonale present (H)  Diabetic polyneuropathy associated with diabetes mellitus due to underlying condition (H)  History of pulmonary embolism  PAF (paroxysmal atrial fibrillation) (H)  History of pneumonia  Pulmonary emphysema, unspecified emphysema type (H)  Unable to care for self  Urinary and fecal incontinence      PMH   has a past medical history of ACP (advance care planning) (09/09/2016), Acute on chronic respiratory failure with hypoxia and hypercapnia (H) (03/22/2025), Acute respiratory failure with hypoxia (H) (03/31/2025), Breast fibrocystic disorder (02/22/2008), Callus of foot (05/29/2019), COPD (chronic obstructive pulmonary disease) (H), Diabetes (H), Diabetic  "ketosis (H) (03/18/2025), Diabetic polyneuropathy associated with diabetes mellitus due to underlying condition (H) (01/17/2023), Essential hypertension (05/28/2019), [place] cardiac risk <10% in next 10 years (02/22/2019), H/O colonoscopy (04/24/2019), Hyperglycemia (03/22/2025), Hyperlipidemia, unspecified hyperlipidemia type (05/28/2019), Hyperparathyroidism due to renal insufficiency (06/14/2016), Hypertension, Intellectual disability (08/01/2017), Memory disturbance (02/13/2020), Microalbuminuria due to type 2 diabetes mellitus (H) (06/14/2016), ADEEL (obstructive sleep apnea) (02/16/2021), PAF (paroxysmal atrial fibrillation) (H) (03/22/2025), Pneumonia (03/22/2025), Pulmonary emphysema (H) (08/21/2015), Stage 3 chronic kidney disease (H) (02/12/2016), Tobacco abuse (07/09/2015), Tremor (11/09/2009), Tubular adenoma of colon (09/28/2018), Type 2 diabetes, HbA1c goal < 7% (H) (07/24/2015), Unable to care for self (03/18/2025), Urinary incontinence, unspecified type (01/17/2023), and Vitamin D deficiency (06/14/2016).    She has no past medical history of Breast cancer (H), Cyst of breast, Inverted nipple, Malignant neoplasm of ovary (H), or Need for prophylactic hormone replacement therapy (postmenopausal).    ROS:4 point ROS including Respiratory, CV, GI and , other than that noted in the HPI,  is negative    EXAM  Vitals: /80   Pulse 72   Temp 98.1  F (36.7  C)   Resp 18   Ht 1.651 m (5' 5\")   Wt 83.6 kg (184 lb 3.2 oz)   SpO2 98%   BMI 30.65 kg/m  ;BMI= Body mass index is 30.65 kg/m .   GENERAL APPEARANCE:  Alert, in no distress, cooperative  ENT:  Mouth and posterior oropharynx normal, moist mucous membranes, [place]  EYES:  EOM, conjunctivae, lids, pupils and irises normal  NECK:  No adenopathy,masses or thyromegaly  RESP:  respiratory effort and palpation of chest normal, lungs clear to auscultation , no respiratory distress  CV:  regular rate and rhythm, no murmur, rub, or gallop, peripheral " edema 1+ in BLE  ABDOMEN:  normal bowel sounds, soft, nontender, no hepatosplenomegaly or other masses, no guarding or rebound  M/S:   Ambulates with walker  SKIN:  Inspection of skin and subcutaneous tissue baseline, reddened dermatitis noted to central face  NEURO:   Cranial nerves 2-12 are normal tested and grossly at patient's baseline, no purposeful movement in upper and lower extremities  PSYCH:  oriented X 3, memory impaired , affect and mood normal    ASSESSMENT/PLAN:  1. Reactive thrombocytosis    2. Hyperlipidemia, unspecified hyperlipidemia type    3. Essential hypertension    4. YADIRA (acute kidney injury)    5. Stage 3 chronic kidney disease, unspecified whether stage 3a or 3b CKD (H)    6. Type 2 diabetes mellitus with hyperglycemia, with long-term current use of insulin (H)    7. Seborrheic dermatitis    8. Intellectual delay    9. Cognitive impairment    10. Physical deconditioning    11. Generalized muscle weakness    12. Acute on chronic respiratory failure with hypoxia and hypercapnia (H)    13. Chronic obstructive pulmonary disease with acute exacerbation (H)    14. ADEEL (obstructive sleep apnea)    15. Perineal abscess    16. Condyloma acuminata    17. Acute pulmonary embolism, unspecified pulmonary embolism type, unspecified whether acute cor pulmonale present (H)    18. Diabetic polyneuropathy associated with diabetes mellitus due to underlying condition (H)    19. History of pulmonary embolism    20. PAF (paroxysmal atrial fibrillation) (H)    21. History of pneumonia    22. Pulmonary emphysema, unspecified emphysema type (H)    23. Unable to care for self    24. Urinary and fecal incontinence        Orders:  1. Facility staff/TC to contact DME company to get their order form for provider to fill out    ELECTRONICALLY SIGNED BY SOILA CERTIFIED PROVIDER:  BLAIR Ramirez CNP   NPI: [id number]  [facility]  [address] [place] [id number]

## 2025-07-16 ENCOUNTER — NURSING HOME VISIT (OUTPATIENT)
Dept: GERIATRICS | Facility: CLINIC | Age: 60
End: 2025-07-16
Payer: COMMERCIAL

## 2025-07-16 VITALS
RESPIRATION RATE: 18 BRPM | WEIGHT: 184.2 LBS | BODY MASS INDEX: 30.69 KG/M2 | TEMPERATURE: 98.1 F | HEIGHT: 65 IN | OXYGEN SATURATION: 98 % | DIASTOLIC BLOOD PRESSURE: 80 MMHG | HEART RATE: 72 BPM | SYSTOLIC BLOOD PRESSURE: 131 MMHG

## 2025-07-16 DIAGNOSIS — I10 ESSENTIAL HYPERTENSION: ICD-10-CM

## 2025-07-16 DIAGNOSIS — J43.9 PULMONARY EMPHYSEMA, UNSPECIFIED EMPHYSEMA TYPE (H): ICD-10-CM

## 2025-07-16 DIAGNOSIS — R32 URINARY AND FECAL INCONTINENCE: ICD-10-CM

## 2025-07-16 DIAGNOSIS — G47.33 OSA (OBSTRUCTIVE SLEEP APNEA): ICD-10-CM

## 2025-07-16 DIAGNOSIS — J44.1 CHRONIC OBSTRUCTIVE PULMONARY DISEASE WITH ACUTE EXACERBATION (H): ICD-10-CM

## 2025-07-16 DIAGNOSIS — Z87.01 HISTORY OF PNEUMONIA: ICD-10-CM

## 2025-07-16 DIAGNOSIS — D75.838 REACTIVE THROMBOCYTOSIS: Primary | ICD-10-CM

## 2025-07-16 DIAGNOSIS — E11.65 TYPE 2 DIABETES MELLITUS WITH HYPERGLYCEMIA, WITH LONG-TERM CURRENT USE OF INSULIN (H): ICD-10-CM

## 2025-07-16 DIAGNOSIS — L21.9 SEBORRHEIC DERMATITIS: ICD-10-CM

## 2025-07-16 DIAGNOSIS — Z86.711 HISTORY OF PULMONARY EMBOLISM: ICD-10-CM

## 2025-07-16 DIAGNOSIS — N18.30 STAGE 3 CHRONIC KIDNEY DISEASE, UNSPECIFIED WHETHER STAGE 3A OR 3B CKD (H): ICD-10-CM

## 2025-07-16 DIAGNOSIS — R15.9 URINARY AND FECAL INCONTINENCE: ICD-10-CM

## 2025-07-16 DIAGNOSIS — E08.42 DIABETIC POLYNEUROPATHY ASSOCIATED WITH DIABETES MELLITUS DUE TO UNDERLYING CONDITION (H): ICD-10-CM

## 2025-07-16 DIAGNOSIS — J96.22 ACUTE ON CHRONIC RESPIRATORY FAILURE WITH HYPOXIA AND HYPERCAPNIA (H): ICD-10-CM

## 2025-07-16 DIAGNOSIS — R53.81 PHYSICAL DECONDITIONING: ICD-10-CM

## 2025-07-16 DIAGNOSIS — L02.215 PERINEAL ABSCESS: ICD-10-CM

## 2025-07-16 DIAGNOSIS — R41.89 COGNITIVE IMPAIRMENT: ICD-10-CM

## 2025-07-16 DIAGNOSIS — N17.9 AKI (ACUTE KIDNEY INJURY): ICD-10-CM

## 2025-07-16 DIAGNOSIS — F81.9 INTELLECTUAL DELAY: ICD-10-CM

## 2025-07-16 DIAGNOSIS — Z79.4 TYPE 2 DIABETES MELLITUS WITH HYPERGLYCEMIA, WITH LONG-TERM CURRENT USE OF INSULIN (H): ICD-10-CM

## 2025-07-16 DIAGNOSIS — E78.5 HYPERLIPIDEMIA, UNSPECIFIED HYPERLIPIDEMIA TYPE: ICD-10-CM

## 2025-07-16 DIAGNOSIS — M62.81 GENERALIZED MUSCLE WEAKNESS: ICD-10-CM

## 2025-07-16 DIAGNOSIS — I26.99 ACUTE PULMONARY EMBOLISM, UNSPECIFIED PULMONARY EMBOLISM TYPE, UNSPECIFIED WHETHER ACUTE COR PULMONALE PRESENT (H): ICD-10-CM

## 2025-07-16 DIAGNOSIS — I48.0 PAF (PAROXYSMAL ATRIAL FIBRILLATION) (H): ICD-10-CM

## 2025-07-16 DIAGNOSIS — Z78.9 UNABLE TO CARE FOR SELF: ICD-10-CM

## 2025-07-16 DIAGNOSIS — J96.21 ACUTE ON CHRONIC RESPIRATORY FAILURE WITH HYPOXIA AND HYPERCAPNIA (H): ICD-10-CM

## 2025-07-16 DIAGNOSIS — A63.0 CONDYLOMA ACUMINATA: ICD-10-CM

## 2025-07-16 NOTE — LETTER
7/16/2025      Marly Cannon  [address]  [facility] [address]        [facility] GERIATRICS    Chief Complaint   Patient presents with    RECHECK     HPI:  Marly Cannon is a 59 year old  (1965), who is being seen today for an episodic care visit at: [facility] ([facility] & ) (NF) [[id number]]. Today's concern is: The primary encounter diagnosis was Reactive thrombocytosis. Diagnoses of Hyperlipidemia, unspecified hyperlipidemia type, Essential hypertension, YADIRA (acute kidney injury), Stage 3 chronic kidney disease, unspecified whether stage 3a or 3b CKD (H), Type 2 diabetes mellitus with hyperglycemia, with long-term current use of insulin (H), Seborrheic dermatitis, Intellectual delay, Cognitive impairment, Physical deconditioning, Generalized muscle weakness, Acute on chronic respiratory failure with hypoxia and hypercapnia (H), Chronic obstructive pulmonary disease with acute exacerbation (H), ADEEL (obstructive sleep apnea), Perineal abscess, Condyloma acuminata, Acute pulmonary embolism, unspecified pulmonary embolism type, unspecified whether acute cor pulmonale present (H), Diabetic polyneuropathy associated with diabetes mellitus due to underlying condition (H), History of pulmonary embolism, PAF (paroxysmal atrial fibrillation) (H), History of pneumonia, Pulmonary emphysema, unspecified emphysema type (H), Unable to care for self, and Urinary and fecal incontinence were also pertinent to this visit.    Met with patient who denies any chest pain, palpitations, shortness of breath, RUBY, lightheadedness, dizziness, or cough. Denies any abdominal discomfort. Denies N&V. Denies B&B concerns. Denies dysuria or frequency. Denies loose or constipation. Appetite good. Sleeping well. Compliant with oxygen at night. Acute flare of dermatitis noted to face. Denies any pain complaints    County finally assessed patient for discharge plans to relocate to [facility] in [place], [place].  "Pending further discharge orders at this time. She reports is very excited to be moving near her family.     BP Readings from Last 3 Encounters:   07/16/25 131/80   07/09/25 113/58   06/19/25 129/79     Wt Readings from Last 5 Encounters:   07/16/25 83.6 kg (184 lb 3.2 oz)   07/09/25 82.7 kg (182 lb 6.4 oz)   06/19/25 83.3 kg (183 lb 9.6 oz)   06/16/25 83.1 kg (183 lb 3.2 oz)   06/10/25 83.1 kg (183 lb 3.2 oz)     Allergies, and PMH/PSH reviewed in EPIC today.  REVIEW OF SYSTEMS:  4 point ROS including Respiratory, CV, GI and , other than that noted in the HPI,  is negative    Objective:   /80   Pulse 72   Temp 98.1  F (36.7  C)   Resp 18   Ht 1.651 m (5' 5\")   Wt 83.6 kg (184 lb 3.2 oz)   SpO2 98%   BMI 30.65 kg/m    GENERAL APPEARANCE:  Alert, in no distress, cooperative  ENT:  Mouth and posterior oropharynx normal, moist mucous membranes, [place]  EYES:  EOM, conjunctivae, lids, pupils and irises normal  NECK:  No adenopathy,masses or thyromegaly  RESP:  respiratory effort and palpation of chest normal, lungs clear to auscultation , no respiratory distress  CV:  regular rate and rhythm, no murmur, rub, or gallop, peripheral edema 1+ in BLE  ABDOMEN:  normal bowel sounds, soft, nontender, no hepatosplenomegaly or other masses, no guarding or rebound  M/S:   Ambulates with walker  SKIN:  Inspection of skin and subcutaneous tissue baseline, reddened dermatitis noted to central face  NEURO:   Cranial nerves 2-12 are normal tested and grossly at patient's baseline, no purposeful movement in upper and lower extremities  PSYCH:  oriented X 3, memory impaired , affect and mood normal    Most Recent 3 CBC's:  Recent Labs   Lab Test 06/12/25  0925 05/29/25  0840 05/21/25  1152   WBC 10.0 9.4 9.3   HGB 13.1 13.2 12.3   MCV 91 93 91    356 382     Most Recent 3 BMP's:  Recent Labs   Lab Test 06/12/25  0925 05/29/25  0840 05/21/25  1152    138 134*   POTASSIUM 4.3 4.6 4.4   CHLORIDE 100 101 97*   CO2 " 23 26 23   BUN 34.4* 34.8* 41.4*   CR 1.43* 1.46* 1.56*   ANIONGAP 15 11 14   NORM 10.0 10.2 9.7   * 182* 218*     Most Recent 2 LFT's:  Recent Labs   Lab Test 05/29/25  0840 05/15/25  0850   AST 21 24   ALT 15 11   ALKPHOS 88 96   BILITOTAL 0.6 0.6     Most Recent Hemoglobin A1c:  Recent Labs   Lab Test 06/12/25  0925   A1C 8.0*     Most Recent Anemia Panel:  Recent Labs   Lab Test 06/12/25  0925 05/21/25  1152 05/15/25  0850 04/23/25  0623 04/21/25  0814 04/07/25  0542 04/06/25  0431 04/05/25  0451 04/19/23  1510 01/17/23  1505   WBC 10.0   < > 10.6   < > 9.7   < > 7.3 7.7   < > 10.1   HGB 13.1   < > 12.5   < > 11.2*   < > 10.1* 9.9*   < > 17.1*   HCT 41.4   < > 39.2   < > 34.8*   < > 31.2* 32.5*   < > 50.1*   MCV 91   < > 94   < > 96   < > 94 96   < > 88      < > 425   < > 650*   < > 282 292   < > 263   IRON  --   --   --   --   --   --   --  51  --   --    IRONSAT  --   --   --   --   --   --   --  27  --   --    RETICABSCT  --   --   --   --  0.116*  --   --   --   --   --    RETP  --   --   --   --  3.2*  --   --   --   --   --    FEB  --   --   --   --   --   --   --  188*  --   --    OLIVA  --   --   --   --   --   --   --  407*  --   --    B12  --   --  559  --   --   --   --  859   < >  --    FOLIC  --   --   --   --   --   --  4.9  --   --   --    EPOE  --   --   --   --   --   --   --   --   --  7    < > = values in this interval not displayed.       ASSESSMENT/PLAN:  Physical deconditioning  Generalized muscle weakness  Unable to care for self  Intellectual delay  Comment: Chronic. S/T prolonged hospitalization. Per therapy- Patient requires set up for UB needs, Supv for LB needs and toileting. Ambulates up to 200ft 4WW SBA. CPT 4.7/5.6. Transitioned to LTC on site pending relocation to [facility] in [facility] [place]  Plan:  -SW to remain involved for safe discharge planning needs  -She was accepted to [facility] in [place].   -Will be discharging soon with services in place.      Acute  on Chronic Hypoxic Respiratory Failure  History of VAP  COPD  ADEEL  PE, Acute, segmental, subsegmental  Pulmonary emphysema  Comment: Acute on chronic. Admitted to [facility] on 3/22/25: She had a repeat CTA that demonstrated progression of her PE and RLL collapse concerning for mucous plugging and pneumonia. Primary team has been having issues w/ progressively increasing FiO2 needs. She had a sputum culture grow Candida & has been on fluconazole since 3/25. Transferred to [facility] d/t concerns that she might require a tracheostomy secondary to her inability to liberate from the vent. At [facility] patient was admitted to the ICU on a ventilator, successfully extubated 4/1 and downgraded to floor status 4/3. Infectious disease consulted and continued patient on Zosyn course to be completed through 4/17/2025. Patient was able to liberate from ventilator withOUT tracheostomy formation.   Plan:  -Monitor respiratory status  -Oxygen 3L via oxymask overnights.   -Would recommend she follows up with sleep medicine in future  -Encourage incentive spirometry every 4 hours while awake  -Continue apixaban 5mg BID  -Continue albuterol inhaler PRN  -Continue umeclidinium-vilanterol (anoro ellipta) 62.5-25mcg inhaler daily  -CMP and CBC due 7/18/25     Perineal Abscess  Condyloma Acuminata  Comment: Acute on chronic. Per recent hospitalization-General surgery consulted for right keiry/perineal abscess and performed incision and drainage with Penrose drain placement 3/31 with subsequent repeat I&D 4/2 and 4/8. s/p acyclovir and I&D x3. Wound is now healed. All wound cares were discontinued  Plan:  -Monitor for worsening s/symptoms of concerns  -Follow up with wound clinic as directed PRN  -Monitor pain complaints  -Continue Tylenol PRN  -CMP and CBC due 7/18/25     Seborrheic dermatitis:   Comment: Acute on chronic. Dermatitis acute flare noted to central face.   Plan:  -Monitor for worsening s/symptoms of  concerns  -Dermatology PRN if symptoms worsen  -Restart triamcinolone BID to face until skin resolves.   -Continue ketoconazole 2% shampoo- apply to scalp and body during showers     T2DM  Comment: Chronic. Poorly controlled as outpatient with A1c 12.7 in march 2025. Now recently 8% on 6/12/25. Used to be on insulin pump. No longer on it due to intellectual delay. Not able to complete own Blood Glucose monitoring along with insulin administration needs despite education, therefore [facility] facility staff will need to perform for her  Plan:  -Continue Blood Glucose QID using Freestyle Bud 2 device. Change device every 14 days and PRN. Update provider if Blood Glucose<70 and.or >450  -Continue jardiance 25mg daily  -Continue insulin aspart sliding scale TID with meals as directed  -Continue insulin glargine 40 units at HS. HOLD if Blood Glucose<120  -CMP and CBC due 7/18/25     CKD stage 3a or 3b  YADIRA  Comment: Chronic. Baseline creatinine~1.1-1.3  Plan:  -Monitor urinary status  -Monitor for worsening s/symptoms of concerns  -CMP and CBC due 7/18/25     Essential hypertension   Hyperlipidemia, unspecified hyperlipidemia type   Comment: Chronic. Based on JNC-8 goals,  patients age of 59 year old, presence of diabetes or CKD, and goals of care goal BP is  <140/90 mm Hg. Patient is stable with current plan of care and routine assessment..  Plan:  -Monitor BP and HR as directed  -Continue amlodipine 5mg daily. HOLD if SBP<100  -Continue atorvastatin 40mg daily  -Continue apixaban 5mg BID  -CMP and CBC due 7/18/25     Reactive thrombocytosis  Comment: Chronic. Platelets historically in normal range (reviewed last 3 years). Peaked at 700,000, now resolved--Likely  reactive - reviewed flow chart for acute thrombocytosis, posted in note. Normal peripheral smear, liver enzymes  Plan;  -Monitor bleeding risks  -Monitor for worsening s/symptoms of concerns  -CMP and CBC due 7/18/25     45 minutes spent on the date of the encounter  doing chart review, history and exam, PMH, documentation and further activities as noted above. Collaboration with nursing staff on site, clinical managers, and therapies were completed on site today.     Electronically signed by:  Dr. Jayshree Montenegro DNP, APRN, FNP-C, WCS-C, EDS-C     Provider reviewed records from facility, and interpreted most recent imaging/lab work, and vital signs.   Acute and chronic conditions managed by writer. Have been reviewed during today's exam       Face to Face and Medical Necessity Statement for DME Provider visit    Demographic Information on Marly Cannon:  Gender: female  : 1965 [address]  [place] [address]  [phone] (home)     Medical Record: [id number]  Social Security Number: [id number]  Primary Care Provider: Jayshree Montenegro  Insurance: Payor: [facility] / Plan: [facility] DUAL / Product Type: HMO /     HPI:   Marly Cannon is a 59 year old  (1965), who is being seen today for a face to face provider visit at [facility]; medical necessity statement for DME included. This patient requires the following:  DME Ordered and Medical Necessity Statement     Rollator--Rollator Not Specific Color --Yfqs Attachment   The patient has a mobility limitation of BLE  that significantly impairs her ability to participate in one or more mobility-related activities of daily living in the home. The patient is able to safely use the walker and the functional mobility deficit can be sufficiently resolved with the use of a Rollator.    2. Adult Pull-Ups--Adult Pull-Ups   Disposable Liners   Refills - 5  Changes per day - 4  Supply Period - 30 days    Patient has urinary and fecal incontinence and will benefit from obtaining adult pull ups.     Oxygen 3 Lpm by Oxymask overnights while sleeping nocturnal ; Clinical Documentation: (Obtain documented RA sat with in 2 days of discharge in acute setting or within 30 days of provider visit):  Method 1: Room air  at rest: Qualifing sat level is < 88% or below on room air at rest on 7/16/25 date.     Oxygen Therapy   SpO2 92 %   O2 Device Nasal cannula with humidification   Oxygen Delivery (S)  2 LPM   Assessment   Respiratory WDL WDL   Rhythm/Pattern, Respiratory pattern regular   Expansion/Accessory Muscles/Retractions no use of accessory muscles   Airway Safety Measures all equipment/monitors on and audible      Patient started on RA, desaturated to 87%. 2 L O2 added to maintain SPO2 in 90's.    Pt needing above DME with expected length of need of 99 year due to medical necessity associated with following diagnosis:     Reactive thrombocytosis  Hyperlipidemia, unspecified hyperlipidemia type  Essential hypertension  YADIRA (acute kidney injury)  Stage 3 chronic kidney disease, unspecified whether stage 3a or 3b CKD (H)  Type 2 diabetes mellitus with hyperglycemia, with long-term current use of insulin (H)  Seborrheic dermatitis  Intellectual delay  Cognitive impairment  Physical deconditioning  Generalized muscle weakness  Acute on chronic respiratory failure with hypoxia and hypercapnia (H)  Chronic obstructive pulmonary disease with acute exacerbation (H)  ADEEL (obstructive sleep apnea)  Perineal abscess  Condyloma acuminata  Acute pulmonary embolism, unspecified pulmonary embolism type, unspecified whether acute cor pulmonale present (H)  Diabetic polyneuropathy associated with diabetes mellitus due to underlying condition (H)  History of pulmonary embolism  PAF (paroxysmal atrial fibrillation) (H)  History of pneumonia  Pulmonary emphysema, unspecified emphysema type (H)  Unable to care for self  Urinary and fecal incontinence      PM   has a past medical history of ACP (advance care planning) (09/09/2016), Acute on chronic respiratory failure with hypoxia and hypercapnia (H) (03/22/2025), Acute respiratory failure with hypoxia (H) (03/31/2025), Breast fibrocystic disorder (02/22/2008), Callus of foot (05/29/2019), COPD  "(chronic obstructive pulmonary disease) (H), Diabetes (H), Diabetic ketosis (H) (03/18/2025), Diabetic polyneuropathy associated with diabetes mellitus due to underlying condition (H) (01/17/2023), Essential hypertension (05/28/2019), Tucker cardiac risk <10% in next 10 years (02/22/2019), H/O colonoscopy (04/24/2019), Hyperglycemia (03/22/2025), Hyperlipidemia, unspecified hyperlipidemia type (05/28/2019), Hyperparathyroidism due to renal insufficiency (06/14/2016), Hypertension, Intellectual disability (08/01/2017), Memory disturbance (02/13/2020), Microalbuminuria due to type 2 diabetes mellitus (H) (06/14/2016), ADEEL (obstructive sleep apnea) (02/16/2021), PAF (paroxysmal atrial fibrillation) (H) (03/22/2025), Pneumonia (03/22/2025), Pulmonary emphysema (H) (08/21/2015), Stage 3 chronic kidney disease (H) (02/12/2016), Tobacco abuse (07/09/2015), Tremor (11/09/2009), Tubular adenoma of colon (09/28/2018), Type 2 diabetes, HbA1c goal < 7% (H) (07/24/2015), Unable to care for self (03/18/2025), Urinary incontinence, unspecified type (01/17/2023), and Vitamin D deficiency (06/14/2016).    She has no past medical history of Breast cancer (H), Cyst of breast, Inverted nipple, Malignant neoplasm of ovary (H), or Need for prophylactic hormone replacement therapy (postmenopausal).    ROS:4 point ROS including Respiratory, CV, GI and , other than that noted in the HPI,  is negative    EXAM  Vitals: /80   Pulse 72   Temp 98.1  F (36.7  C)   Resp 18   Ht 1.651 m (5' 5\")   Wt 83.6 kg (184 lb 3.2 oz)   SpO2 98%   BMI 30.65 kg/m  ;BMI= Body mass index is 30.65 kg/m .   GENERAL APPEARANCE:  Alert, in no distress, cooperative  ENT:  Mouth and posterior oropharynx normal, moist mucous membranes, [place]  EYES:  EOM, conjunctivae, lids, pupils and irises normal  NECK:  No adenopathy,masses or thyromegaly  RESP:  respiratory effort and palpation of chest normal, lungs clear to auscultation , no respiratory " distress  CV:  regular rate and rhythm, no murmur, rub, or gallop, peripheral edema 1+ in BLE  ABDOMEN:  normal bowel sounds, soft, nontender, no hepatosplenomegaly or other masses, no guarding or rebound  M/S:   Ambulates with walker  SKIN:  Inspection of skin and subcutaneous tissue baseline, reddened dermatitis noted to central face  NEURO:   Cranial nerves 2-12 are normal tested and grossly at patient's baseline, no purposeful movement in upper and lower extremities  PSYCH:  oriented X 3, memory impaired , affect and mood normal    ASSESSMENT/PLAN:  1. Reactive thrombocytosis    2. Hyperlipidemia, unspecified hyperlipidemia type    3. Essential hypertension    4. YADIRA (acute kidney injury)    5. Stage 3 chronic kidney disease, unspecified whether stage 3a or 3b CKD (H)    6. Type 2 diabetes mellitus with hyperglycemia, with long-term current use of insulin (H)    7. Seborrheic dermatitis    8. Intellectual delay    9. Cognitive impairment    10. Physical deconditioning    11. Generalized muscle weakness    12. Acute on chronic respiratory failure with hypoxia and hypercapnia (H)    13. Chronic obstructive pulmonary disease with acute exacerbation (H)    14. ADEEL (obstructive sleep apnea)    15. Perineal abscess    16. Condyloma acuminata    17. Acute pulmonary embolism, unspecified pulmonary embolism type, unspecified whether acute cor pulmonale present (H)    18. Diabetic polyneuropathy associated with diabetes mellitus due to underlying condition (H)    19. History of pulmonary embolism    20. PAF (paroxysmal atrial fibrillation) (H)    21. History of pneumonia    22. Pulmonary emphysema, unspecified emphysema type (H)    23. Unable to care for self    24. Urinary and fecal incontinence        Orders:  1. Facility staff/TC to contact DME company to get their order form for provider to fill out    ELECTRONICALLY SIGNED BY SOILA CERTIFIED PROVIDER:  BLAIR Ramirez CNP   NPI: [id number]  [facility]  SERVICES  [address], [place] [address]            Sincerely,        BLAIR Ramirez CNP    Electronically signed

## 2025-07-17 ENCOUNTER — LAB REQUISITION (OUTPATIENT)
Dept: LAB | Facility: CLINIC | Age: 60
End: 2025-07-17
Payer: COMMERCIAL

## 2025-07-17 DIAGNOSIS — I10 ESSENTIAL (PRIMARY) HYPERTENSION: ICD-10-CM

## 2025-07-17 DIAGNOSIS — N18.30 CHRONIC KIDNEY DISEASE, STAGE 3 UNSPECIFIED (H): ICD-10-CM

## 2025-07-17 DIAGNOSIS — D64.9 ANEMIA, UNSPECIFIED: ICD-10-CM

## 2025-07-17 RX ORDER — TRIAMCINOLONE ACETONIDE 1 MG/G
CREAM TOPICAL
Qty: 80 G | Refills: 1 | Status: SHIPPED | OUTPATIENT
Start: 2025-07-17

## 2025-07-18 LAB
ALBUMIN SERPL BCG-MCNC: 3.9 G/DL (ref 3.5–5.2)
ALP SERPL-CCNC: 84 U/L (ref 40–150)
ALT SERPL W P-5'-P-CCNC: 10 U/L (ref 0–50)
ANION GAP SERPL CALCULATED.3IONS-SCNC: 14 MMOL/L (ref 7–15)
AST SERPL W P-5'-P-CCNC: 21 U/L (ref 0–45)
BASOPHILS # BLD AUTO: 0.1 10E3/UL (ref 0–0.2)
BASOPHILS NFR BLD AUTO: 1 %
BILIRUB SERPL-MCNC: 0.5 MG/DL
BUN SERPL-MCNC: 39.1 MG/DL (ref 8–23)
CALCIUM SERPL-MCNC: 10 MG/DL (ref 8.8–10.4)
CHLORIDE SERPL-SCNC: 101 MMOL/L (ref 98–107)
CREAT SERPL-MCNC: 1.34 MG/DL (ref 0.51–0.95)
EGFRCR SERPLBLD CKD-EPI 2021: 45 ML/MIN/1.73M2
EOSINOPHIL # BLD AUTO: 0.2 10E3/UL (ref 0–0.7)
EOSINOPHIL NFR BLD AUTO: 2 %
ERYTHROCYTE [DISTWIDTH] IN BLOOD BY AUTOMATED COUNT: 12.5 % (ref 10–15)
GLUCOSE SERPL-MCNC: 253 MG/DL (ref 70–99)
HCO3 SERPL-SCNC: 25 MMOL/L (ref 22–29)
HCT VFR BLD AUTO: 43.7 % (ref 35–47)
HGB BLD-MCNC: 14.2 G/DL (ref 11.7–15.7)
IMM GRANULOCYTES # BLD: 0 10E3/UL
IMM GRANULOCYTES NFR BLD: 0 %
LYMPHOCYTES # BLD AUTO: 2.4 10E3/UL (ref 0.8–5.3)
LYMPHOCYTES NFR BLD AUTO: 26 %
MCH RBC QN AUTO: 29.2 PG (ref 26.5–33)
MCHC RBC AUTO-ENTMCNC: 32.5 G/DL (ref 31.5–36.5)
MCV RBC AUTO: 90 FL (ref 78–100)
MONOCYTES # BLD AUTO: 0.6 10E3/UL (ref 0–1.3)
MONOCYTES NFR BLD AUTO: 7 %
NEUTROPHILS # BLD AUTO: 5.9 10E3/UL (ref 1.6–8.3)
NEUTROPHILS NFR BLD AUTO: 64 %
NRBC # BLD AUTO: 0 10E3/UL
NRBC BLD AUTO-RTO: 0 /100
PLATELET # BLD AUTO: 274 10E3/UL (ref 150–450)
POTASSIUM SERPL-SCNC: 4.5 MMOL/L (ref 3.4–5.3)
PROT SERPL-MCNC: 6.9 G/DL (ref 6.4–8.3)
RBC # BLD AUTO: 4.87 10E6/UL (ref 3.8–5.2)
SODIUM SERPL-SCNC: 140 MMOL/L (ref 135–145)
WBC # BLD AUTO: 9.3 10E3/UL (ref 4–11)

## 2025-07-18 PROCEDURE — 36415 COLL VENOUS BLD VENIPUNCTURE: CPT | Mod: ORL | Performed by: NURSE PRACTITIONER

## 2025-07-18 PROCEDURE — 80053 COMPREHEN METABOLIC PANEL: CPT | Mod: ORL | Performed by: NURSE PRACTITIONER

## 2025-07-18 PROCEDURE — 85025 COMPLETE CBC W/AUTO DIFF WBC: CPT | Mod: ORL | Performed by: NURSE PRACTITIONER

## 2025-07-18 NOTE — PLAN OF CARE
Problem: Adult Inpatient Plan of Care  Goal: Plan of Care Review  Description: The Plan of Care Review/Shift note should be completed every shift.  The Outcome Evaluation is a brief statement about your assessment that the patient is improving, declining, or no change.  This information will be displayed automatically on your shiftnote.  Outcome: Progressing     Problem: Adult Inpatient Plan of Care  Goal: Absence of Hospital-Acquired Illness or Injury  Intervention: Prevent Skin Injury  Recent Flowsheet Documentation  Taken 4/10/2025 2209 by Danie Gamboa RN  Body Position:   turned   right  Taken 4/10/2025 2019 by Danie Gamboa RN  Body Position:   turned   left  Taken 4/10/2025 1800 by Danie Gamboa, RN  Body Position:   turned   right  Taken 4/10/2025 1557 by Danie Gamboa RN  Body Position:   turned   left     Problem: Adult Inpatient Plan of Care  Goal: Optimal Comfort and Wellbeing  Outcome: Progressing   Goal Outcome Evaluation:    A & O x 4, VSS, on 3 L NC. Turned every 2 hours, denied pain. Minimal output from rectal tube and minimal drainage from penrose tube. Wound vac in place. Pleasant and cooperative, able to make needs known.        Exercise Session Details

## 2025-07-22 RX ORDER — TRIAMCINOLONE ACETONIDE 1 MG/G
CREAM TOPICAL
Qty: 80 G | Refills: 0 | Status: SHIPPED | OUTPATIENT
Start: 2025-07-22

## 2025-07-22 RX ORDER — MULTIPLE VITAMINS W/ MINERALS TAB 9MG-400MCG
1 TAB ORAL DAILY
Qty: 30 TABLET | Refills: 0 | Status: SHIPPED | OUTPATIENT
Start: 2025-07-22

## 2025-07-22 RX ORDER — ATORVASTATIN CALCIUM 40 MG/1
40 TABLET, FILM COATED ORAL AT BEDTIME
Qty: 30 TABLET | Refills: 0 | Status: SHIPPED | OUTPATIENT
Start: 2025-07-22

## 2025-07-22 RX ORDER — SENNOSIDES 8.6 MG
1 TABLET ORAL 2 TIMES DAILY PRN
Qty: 30 TABLET | Refills: 0 | Status: SHIPPED | OUTPATIENT
Start: 2025-07-22

## 2025-07-22 RX ORDER — KETOCONAZOLE 20 MG/ML
SHAMPOO, SUSPENSION TOPICAL
Qty: 120 ML | Refills: 0 | Status: SHIPPED | OUTPATIENT
Start: 2025-07-22

## 2025-07-22 RX ORDER — ONDANSETRON 4 MG/1
4 TABLET, ORALLY DISINTEGRATING ORAL EVERY 6 HOURS PRN
Qty: 30 TABLET | Refills: 0 | Status: SHIPPED | OUTPATIENT
Start: 2025-07-22

## 2025-07-22 RX ORDER — UMECLIDINIUM BROMIDE AND VILANTEROL TRIFENATATE 62.5; 25 UG/1; UG/1
1 POWDER RESPIRATORY (INHALATION) DAILY
Qty: 60 EACH | Refills: 0 | Status: SHIPPED | OUTPATIENT
Start: 2025-07-22

## 2025-07-22 RX ORDER — ACETAMINOPHEN 160 MG
1 TABLET,DISINTEGRATING ORAL DAILY
Qty: 30 CAPSULE | Refills: 0 | Status: SHIPPED | OUTPATIENT
Start: 2025-07-22

## 2025-07-22 RX ORDER — AMLODIPINE BESYLATE 5 MG/1
5 TABLET ORAL DAILY
Qty: 30 TABLET | Refills: 0 | Status: SHIPPED | OUTPATIENT
Start: 2025-07-22

## 2025-07-22 RX ORDER — POLYETHYLENE GLYCOL 3350 17 G/17G
17 POWDER, FOR SOLUTION ORAL DAILY
Qty: 510 G | Refills: 0 | Status: SHIPPED | OUTPATIENT
Start: 2025-07-22

## 2025-07-22 RX ORDER — ACETAMINOPHEN 325 MG/1
650 TABLET ORAL EVERY 6 HOURS PRN
Qty: 30 TABLET | Refills: 0 | Status: SHIPPED | OUTPATIENT
Start: 2025-07-22

## 2025-07-22 RX ORDER — ALBUTEROL SULFATE 90 UG/1
2 INHALANT RESPIRATORY (INHALATION) EVERY 4 HOURS PRN
Qty: 18 G | Refills: 0 | Status: SHIPPED | OUTPATIENT
Start: 2025-07-22

## 2025-07-22 NOTE — PROGRESS NOTES
[facility] GERIATRICS DISCHARGE SUMMARY  PATIENT'S NAME: Marly Cannon  YOB: 1965  MEDICAL RECORD NUMBER:  [id number]  Place of Service where encounter took place:  [facility] (TCU)(SNF) [[id number]]    PRIMARY CARE PROVIDER AND CLINIC RESPONSIBLE AFTER TRANSFER:   To establish PCP services on site at [facility] in [facility] Provider     Transferring providers: BLAIR Ramirez CNP, Dr. Lexus Sims MD  Recent Hospitalization/ED:  [facility] (TCU) stay 5/9/25-.6/13/25 [facility] . Hospital stay 4/11/25 through 5/9/25..   Date of SNF Admission: June 13, 2025  Date of SNF (anticipated) Discharge: July 24, 2025  Discharged to:[facility]: [place], [place]  Cognitive Scores: CPT: 4.7/5.6  Physical Function: Ambulating 100 ft with walker  DME: SNF  coordinating DME needs     CODE STATUS/ADVANCE DIRECTIVES DISCUSSION:  Full Code   ALLERGIES: Patient has no known allergies.    NURSING FACILITY COURSE   Medication Changes/Rationale:   See below    Summary of nursing facility stay:   Physical deconditioning  Generalized muscle weakness  Unable to care for self  Intellectual delay  Comment: Chronic. S/T prolonged hospitalization. Per therapy- Patient requires set up for UB needs, Supv for LB needs and toileting. Ambulates up to 200ft 4WW SBA. CPT 4.7/5.6. Transitioned to LTC on site pending relocation to [facility] in [place]  Plan:  -SW to remain involved for safe discharge planning needs  -She was accepted to [facility] in [place].   -Will be discharging soon with services on 7/24/25     Acute on Chronic Hypoxic Respiratory Failure  History of VAP  COPD  ADEEL  PE, Acute, segmental, subsegmental  Pulmonary emphysema  Comment: Acute on chronic. Admitted to [facility] on 3/22/25: She had a repeat CTA that demonstrated progression of her PE and RLL  collapse concerning for mucous plugging and pneumonia. Primary team has been having issues w/ progressively increasing FiO2 needs. She had a sputum culture grow Candida & has been on fluconazole since 3/25. Transferred to [facility] d/t concerns that she might require a tracheostomy secondary to her inability to liberate from the vent. At [facility] patient was admitted to the ICU on a ventilator, successfully extubated 4/1 and downgraded to floor status 4/3. Infectious disease consulted and continued patient on Zosyn course to be completed through 4/17/2025. Patient was able to liberate from ventilator withOUT tracheostomy formation.   Plan:  -Monitor respiratory status  -Oxygen 2L via oxymask overnights.   -Would recommend she follows up with sleep medicine in future  -Encourage incentive spirometry every 4 hours while awake  -Continue apixaban 5mg BID  -Continue albuterol inhaler PRN  -Continue umeclidinium-vilanterol (anoro ellipta) 62.5-25mcg inhaler daily  -Recent labs stable. Trend with new PCP post discharge     Perineal Abscess--RESOLVED  Condyloma Acuminata---RESOLVED  Comment: Acute on chronic. Per recent hospitalization-General surgery consulted for right keiry/perineal abscess and performed incision and drainage with Penrose drain placement 3/31 with subsequent repeat I&D 4/2 and 4/8. s/p acyclovir and I&D x3. Wound is now healed. All wound cares were discontinued  Plan:  -Monitor for worsening s/symptoms of concerns  -Follow up with wound clinic as directed PRN  -Monitor pain complaints  -Continue Tylenol PRN  -Recent labs stable. Trend with new PCP post discharge     Seborrheic dermatitis:   Comment: Acute on chronic. Dermatitis acute flare noted to central face.   Plan:  -Monitor for worsening s/symptoms of concerns  -Dermatology PRN if symptoms worsen  -Continue triamcinolone BID to face until skin resolves.   -Continue ketoconazole 2% shampoo- apply to scalp and body during  paz     T2DM  Comment: Chronic. Poorly controlled as outpatient with A1c 12.7 in march 2025. Now recently 8% on 6/12/25. Used to be on insulin pump. No longer on it due to intellectual delay. Not able to complete own Blood Glucose monitoring along with insulin administration needs despite education, therefore [facility] facility staff will need to perform for her  Plan:  -Continue Blood Glucose QID using Freestyle Bud 2 device. Change device every 14 days and PRN. Update provider if Blood Glucose<70 and.or >450  -Continue jardiance 25mg daily  -Continue insulin aspart sliding scale TID with meals as directed  -Continue insulin glargine 40 units at HS. HOLD if Blood Glucose<120  -Recent labs stable. Trend with new PCP post discharge     CKD stage 3a or 3b  YADIRA  Comment: Chronic. Baseline creatinine~1.1-1.3  Plan:  -Monitor urinary status  -Monitor for worsening s/symptoms of concerns  -Recent labs stable. Trend with new PCP post discharge     Essential hypertension   Hyperlipidemia, unspecified hyperlipidemia type   Comment: Chronic. Based on JNC-8 goals,  patients age of 59 year old, presence of diabetes or CKD, and goals of care goal BP is  <140/90 mm Hg. Patient is stable with current plan of care and routine assessment..  Plan:  -Monitor BP and HR as directed  -Continue amlodipine 5mg daily. HOLD if SBP<100  -Continue atorvastatin 40mg daily  -Continue apixaban 5mg BID  -Recent labs stable. Trend with new PCP post discharge     Reactive thrombocytosis  Comment: Chronic. Platelets historically in normal range (reviewed last 3 years). Peaked at 700,000, now resolved--Likely  reactive - reviewed flow chart for acute thrombocytosis, posted in note. Normal peripheral smear, liver enzymes  Plan;  -Monitor bleeding risks  -Monitor for worsening s/symptoms of concerns  -Recent labs stable. Trend with new PCP post discharge    Discharge Medications:  MED REC REQUIRED  Post Medication Reconciliation Status:  Discharge  medications reconciled and changed, see notes/orders     Current Outpatient Medications   Medication Sig Dispense Refill    acetaminophen (TYLENOL) 325 MG tablet Take 2 tablets (650 mg) by mouth every 6 hours as needed for mild pain or fever. 30 tablet 0    albuterol (PROAIR HFA/PROVENTIL HFA/VENTOLIN HFA) 108 (90 Base) MCG/ACT inhaler Inhale 2 puffs into the lungs every 4 hours as needed for cough, wheezing or shortness of breath. 18 g 0    amLODIPine (NORVASC) 5 MG tablet Take 1 tablet (5 mg) by mouth daily. 30 tablet 0    apixaban ANTICOAGULANT (ELIQUIS) 5 MG tablet Take 1 tablet (5 mg) by mouth 2 times daily. 60 tablet 0    atorvastatin (LIPITOR) 40 MG tablet Take 1 tablet (40 mg) by mouth at bedtime. 30 tablet 0    Cholecalciferol (VITAMIN D3) 50 MCG (2000 UT) CAPS Take 1 capsule by mouth daily. 30 capsule 0    Continuous Glucose Sensor (FREESTYLE HANK 2 SENSOR) MISC 1 each every 14 days. Use 1 sensor every 14 days. Use to read blood sugars per 's instructions. 6 each 0    empagliflozin (JARDIANCE) 25 MG TABS tablet Take 1 tablet (25 mg) by mouth daily. 30 tablet 0    insulin aspart (NOVOLOG PEN) 100 UNIT/ML pen Inject 1-7 Units subcutaneously 3 times daily (with meals). Inject as per sliding scale: if 140 - 189 = 1; 190 - 239 = 2; 240 - 289 = 3; 290 - 339 = 4; 340 - 389 = 5; 390 - 439 = 6; 440+ = 7 15 mL 0    insulin glargine (LANTUS PEN) 100 UNIT/ML pen Inject 40 Units subcutaneously at bedtime. HOLD if Blood Glucose<120 12 mL 0    ketoconazole (NIZORAL) 2 % external shampoo Apply to scalp and body on shower days 120 mL 0    multivitamin w/minerals (THERA-VIT-M) tablet Take 1 tablet by mouth daily. 30 tablet 0    ondansetron (ZOFRAN ODT) 4 MG ODT tab Take 1 tablet (4 mg) by mouth every 6 hours as needed for vomiting or nausea. 30 tablet 0    polyethylene glycol (MIRALAX) 17 GM/Dose powder Take 17 g by mouth daily. 510 g 0    sennosides (SENOKOT) 8.6 MG tablet Take 1 tablet by mouth 2 times daily  as needed for constipation. 30 tablet 0    triamcinolone (KENALOG) 0.1 % external cream Apply to face BID until skin resolves 80 g 0    umeclidinium-vilanterol (ANORO ELLIPTA) 62.5-25 MCG/ACT oral inhaler Inhale 1 puff into the lungs daily. 60 each 0      Controlled medications:   not applicable/none     Past Medical History:   Past Medical History:   Diagnosis Date    ACP (advance care planning) 09/09/2016    Advance Care Planning 9/9/2016: ACP Review of Chart / Resources Provided:  Reviewed chart for advance care plan.  Marly Cannon has been provided information and resources to begin or update their advance care plan.  Added by Naty Allen               Acute on chronic respiratory failure with hypoxia and hypercapnia (H) 03/22/2025    Acute respiratory failure with hypoxia (H) 03/31/2025    Breast fibrocystic disorder 02/22/2008    Overview:   IMO Update 10/11      Callus of foot 05/29/2019    COPD (chronic obstructive pulmonary disease) (H)     Diabetes (H)     Diabetic ketosis (H) 03/18/2025    Diabetic polyneuropathy associated with diabetes mellitus due to underlying condition (H) 01/17/2023    Essential hypertension 05/28/2019    [place] cardiac risk <10% in next 10 years 02/22/2019    Overview:   Started high intensity statin.       H/O colonoscopy 04/24/2019    Had in 10/2018, due for repeat 5 years      Hyperglycemia 03/22/2025    Hyperlipidemia, unspecified hyperlipidemia type 05/28/2019    Hyperparathyroidism due to renal insufficiency 06/14/2016    Hypertension     Intellectual disability 08/01/2017    Memory disturbance 02/13/2020    Microalbuminuria due to type 2 diabetes mellitus (H) 06/14/2016    ADEEL (obstructive sleep apnea) 02/16/2021    Interp for PSG dated 12/16/2020.     Weight 202 lbs.  Total sleep time 417.5 minutes, sleep efficiency 83%, sleep latency 15 minutes, REM latency - minutes.  Arousal index 45.6.  Sleep architecture was incredibly fragmented with no clear REM  "observed.     AHI 43.1, events appearing primarily obstructive in nature.  Mean Spo2 86%, isabel SpO2 78%, 96.2% of recording was <= 89%.       EKG appeared NS    PAF (paroxysmal atrial fibrillation) (H) 03/22/2025    Pneumonia 03/22/2025    Pulmonary emphysema (H) 08/21/2015    Confirmed via PFTs in 8/2015      Stage 3 chronic kidney disease (H) 02/12/2016    Overview:   Updated per 10/1/17 IMO import      Tobacco abuse 07/09/2015    Tremor 11/09/2009    Formatting of this note might be different from the original.  Shakes head      Tubular adenoma of colon 09/28/2018    Type 2 diabetes, HbA1c goal < 7% (H) 07/24/2015    Unable to care for self 03/18/2025    Urinary incontinence, unspecified type 01/17/2023    Vitamin D deficiency 06/14/2016     Physical Exam:   Vitals: /86   Pulse 74   Temp 98.1  F (36.7  C)   Resp 18   Ht 1.651 m (5' 5\")   Wt 83.6 kg (184 lb 3.2 oz)   SpO2 98%   BMI 30.65 kg/m    BMI: Body mass index is 30.65 kg/m .  GENERAL APPEARANCE:  Alert, in no distress, cooperative  ENT:  Mouth and posterior oropharynx normal, moist mucous membranes, [place]  EYES:  EOM, conjunctivae, lids, pupils and irises normal  RESP:  respiratory effort and palpation of chest normal, lungs clear to auscultation , no respiratory distress  CV:  regular rate and rhythm, no murmur, rub, or gallop, peripheral edema 1+ in BLE  ABDOMEN:  normal bowel sounds, soft, nontender, no hepatosplenomegaly or other masses, no guarding or rebound  M/S:   Ambulates with walker  SKIN:  Inspection of skin and subcutaneous tissue baseline, mild dermatitis noted to central face, but slowly improving with regimen  NEURO:   Cranial nerves 2-12 are normal tested and grossly at patient's baseline, no purposeful movement in upper and lower extremities  PSYCH:  oriented X 3, memory impaired , affect and mood normal     SNF labs: Most Recent 3 CBC's:  Recent Labs   Lab Test 07/18/25  1118 06/12/25  0925 05/29/25  0840   WBC 9.3 10.0 9.4 "   HGB 14.2 13.1 13.2   MCV 90 91 93    350 356     Most Recent 3 BMP's:  Recent Labs   Lab Test 07/18/25  1118 06/12/25  0925 05/29/25  0840    138 138   POTASSIUM 4.5 4.3 4.6   CHLORIDE 101 100 101   CO2 25 23 26   BUN 39.1* 34.4* 34.8*   CR 1.34* 1.43* 1.46*   ANIONGAP 14 15 11   NORM 10.0 10.0 10.2   * 263* 182*     Most Recent 2 LFT's:  Recent Labs   Lab Test 07/18/25  1118 05/29/25  0840   AST 21 21   ALT 10 15   ALKPHOS 84 88   BILITOTAL 0.5 0.6     Most Recent Hemoglobin A1c:  Recent Labs   Lab Test 06/12/25  0925   A1C 8.0*     Most Recent Anemia Panel:  Recent Labs   Lab Test 07/18/25  1118 05/21/25  1152 05/15/25  0850 04/23/25  0623 04/21/25  0814 04/07/25  0542 04/06/25  0431 04/05/25  0451 04/19/23  1510 01/17/23  1505   WBC 9.3   < > 10.6   < > 9.7   < > 7.3 7.7   < > 10.1   HGB 14.2   < > 12.5   < > 11.2*   < > 10.1* 9.9*   < > 17.1*   HCT 43.7   < > 39.2   < > 34.8*   < > 31.2* 32.5*   < > 50.1*   MCV 90   < > 94   < > 96   < > 94 96   < > 88      < > 425   < > 650*   < > 282 292   < > 263   IRON  --   --   --   --   --   --   --  51  --   --    IRONSAT  --   --   --   --   --   --   --  27  --   --    RETICABSCT  --   --   --   --  0.116*  --   --   --   --   --    RETP  --   --   --   --  3.2*  --   --   --   --   --    FEB  --   --   --   --   --   --   --  188*  --   --    OLIVA  --   --   --   --   --   --   --  407*  --   --    B12  --   --  559  --   --   --   --  859   < >  --    FOLIC  --   --   --   --   --   --  4.9  --   --   --    EPOE  --   --   --   --   --   --   --   --   --  7    < > = values in this interval not displayed.       DISCHARGE PLAN:  Follow up labs: No labs orders/due  Medical Follow Up:      Follow up with primary care provider in 1 weeks  [facility] scheduled appointments:  Appointments in Next Year    No future appts.    Discharge Services: Home Care:  Occupational Therapy, Physical Therapy, and Registered Nurse  Discharge Instructions  Verbalized to Patient at Discharge:   Weight bearing restrictions:  Weight bearing as tolerated.   Monitor blood glucose monitoring 4 times a day. Keep a record and bring it to your next primary provider visit.   Continue to follow your diet:  Regular diet, Level 7-Easy to Chew texture, Thin Liquids Level 0 consistency.   O2 at 3 Lpm by Oxymask overnights while sleeping  Driving is not recommended   24-hour supervision is recommended for safety.     TOTAL DISCHARGE TIME:   32 minutes  Electronically signed by:  Dr. Jayshree Montenegro DNP, APRN, FNP-C, WCS-C, EDS-C      Documentation of Face to Face and Certification for Home Health Services    I certify that patient: Marly Cannon is under my care and that I, or a nurse practitioner or physician's assistant working with me, had a face-to-face encounter that meets the physician face-to-face encounter requirements with this patient on: 7/23/2025.    This encounter with the patient was in whole, or in part, for the following medical condition, which is the primary reason for home health care: The primary encounter diagnosis was Reactive thrombocytosis. Diagnoses of Hyperlipidemia, unspecified hyperlipidemia type, Essential hypertension, YADIRA (acute kidney injury), Stage 3 chronic kidney disease, unspecified whether stage 3a or 3b CKD (H), Type 2 diabetes mellitus with hyperglycemia, with long-term current use of insulin (H), Seborrheic dermatitis, Cognitive impairment, Intellectual delay, Physical deconditioning, Generalized muscle weakness, Acute on chronic respiratory failure with hypoxia and hypercapnia (H), Chronic obstructive pulmonary disease with acute exacerbation (H), ADEEL (obstructive sleep apnea), Perineal abscess, Condyloma acuminata, Diabetic polyneuropathy associated with diabetes mellitus due to underlying condition (H), History of pulmonary embolism, PAF (paroxysmal atrial fibrillation) (H), History of pneumonia, Pulmonary emphysema, unspecified emphysema  type (H), Unable to care for self, Urinary and fecal incontinence, Slow transit constipation, Open wound, Type 2 diabetes, HbA1c goal < 7% (H), and Vitamin D deficiency were also pertinent to this visit..    I certify that, based on my findings, the following services are medically necessary home health services: Nursing, Occupational Therapy, and Physical Therapy.    My clinical findings support the need for the above services because: Nurse is needed: To assess medication management, education after changes in medications or other medical regimen.., Occupational Therapy Services are needed to assess and treat cognitive ability and address ADL safety due to impairment in deconditioning., and Physical Therapy Services are needed to assess and treat the following functional impairments: deconditioning.    Further, I certify that my clinical findings support that this patient is homebound (i.e. absences from home require considerable and taxing effort and are for medical reasons or [facility] services or infrequently or of short duration when for other reasons) because: Requires assistance of another person or specialized equipment to access medical services because patient: Is unable to walk greater than 100 feet without rest. and Requires supervision of another for safe transfer...    Based on the above findings. I certify that this patient is confined to the home and needs intermittent skilled nursing care, physical therapy and/or speech therapy.  The patient is under my care, and I have initiated the establishment of the plan of care.  This patient will be followed by a physician who will periodically review the plan of care.  Physician/Provider to provide follow up care: To establish PCP services on site at [facility] in [place], [place]    Attending hospital physician (the Medicare certified [facility] provider): Dr.Emily Levi MD, signing F2F and only signing for initial order. Please send all follow up questions  and concerns or needed follow up signatures to the PCP, who [place] has on file as:  To establish PCP services on site at [facility] in [place], [place]    Physician Signature: See electronic signature associated with these discharge orders.  Date: 7/22/2025

## 2025-07-23 ENCOUNTER — DISCHARGE SUMMARY NURSING HOME (OUTPATIENT)
Dept: GERIATRICS | Facility: CLINIC | Age: 60
End: 2025-07-23
Payer: COMMERCIAL

## 2025-07-23 VITALS
RESPIRATION RATE: 18 BRPM | TEMPERATURE: 98.1 F | HEIGHT: 65 IN | BODY MASS INDEX: 30.69 KG/M2 | DIASTOLIC BLOOD PRESSURE: 86 MMHG | OXYGEN SATURATION: 98 % | WEIGHT: 184.2 LBS | HEART RATE: 74 BPM | SYSTOLIC BLOOD PRESSURE: 124 MMHG

## 2025-07-23 DIAGNOSIS — E55.9 VITAMIN D DEFICIENCY: ICD-10-CM

## 2025-07-23 DIAGNOSIS — E11.65 TYPE 2 DIABETES MELLITUS WITH HYPERGLYCEMIA, WITH LONG-TERM CURRENT USE OF INSULIN (H): ICD-10-CM

## 2025-07-23 DIAGNOSIS — Z78.9 UNABLE TO CARE FOR SELF: ICD-10-CM

## 2025-07-23 DIAGNOSIS — J44.1 CHRONIC OBSTRUCTIVE PULMONARY DISEASE WITH ACUTE EXACERBATION (H): ICD-10-CM

## 2025-07-23 DIAGNOSIS — M62.81 GENERALIZED MUSCLE WEAKNESS: ICD-10-CM

## 2025-07-23 DIAGNOSIS — Z87.01 HISTORY OF PNEUMONIA: ICD-10-CM

## 2025-07-23 DIAGNOSIS — J96.22 ACUTE ON CHRONIC RESPIRATORY FAILURE WITH HYPOXIA AND HYPERCAPNIA (H): ICD-10-CM

## 2025-07-23 DIAGNOSIS — R53.81 PHYSICAL DECONDITIONING: ICD-10-CM

## 2025-07-23 DIAGNOSIS — J43.9 PULMONARY EMPHYSEMA, UNSPECIFIED EMPHYSEMA TYPE (H): ICD-10-CM

## 2025-07-23 DIAGNOSIS — Z86.711 HISTORY OF PULMONARY EMBOLISM: ICD-10-CM

## 2025-07-23 DIAGNOSIS — A63.0 CONDYLOMA ACUMINATA: ICD-10-CM

## 2025-07-23 DIAGNOSIS — K59.01 SLOW TRANSIT CONSTIPATION: ICD-10-CM

## 2025-07-23 DIAGNOSIS — E08.42 DIABETIC POLYNEUROPATHY ASSOCIATED WITH DIABETES MELLITUS DUE TO UNDERLYING CONDITION (H): ICD-10-CM

## 2025-07-23 DIAGNOSIS — F81.9 INTELLECTUAL DELAY: ICD-10-CM

## 2025-07-23 DIAGNOSIS — D75.838 REACTIVE THROMBOCYTOSIS: Primary | ICD-10-CM

## 2025-07-23 DIAGNOSIS — L02.215 PERINEAL ABSCESS: ICD-10-CM

## 2025-07-23 DIAGNOSIS — E11.9 TYPE 2 DIABETES, HBA1C GOAL < 7% (H): ICD-10-CM

## 2025-07-23 DIAGNOSIS — R41.89 COGNITIVE IMPAIRMENT: ICD-10-CM

## 2025-07-23 DIAGNOSIS — Z79.4 TYPE 2 DIABETES MELLITUS WITH HYPERGLYCEMIA, WITH LONG-TERM CURRENT USE OF INSULIN (H): ICD-10-CM

## 2025-07-23 DIAGNOSIS — N17.9 AKI (ACUTE KIDNEY INJURY): ICD-10-CM

## 2025-07-23 DIAGNOSIS — R15.9 URINARY AND FECAL INCONTINENCE: ICD-10-CM

## 2025-07-23 DIAGNOSIS — N18.30 STAGE 3 CHRONIC KIDNEY DISEASE, UNSPECIFIED WHETHER STAGE 3A OR 3B CKD (H): ICD-10-CM

## 2025-07-23 DIAGNOSIS — I10 ESSENTIAL HYPERTENSION: ICD-10-CM

## 2025-07-23 DIAGNOSIS — I48.0 PAF (PAROXYSMAL ATRIAL FIBRILLATION) (H): ICD-10-CM

## 2025-07-23 DIAGNOSIS — T14.8XXA OPEN WOUND: ICD-10-CM

## 2025-07-23 DIAGNOSIS — L21.9 SEBORRHEIC DERMATITIS: ICD-10-CM

## 2025-07-23 DIAGNOSIS — G47.33 OSA (OBSTRUCTIVE SLEEP APNEA): ICD-10-CM

## 2025-07-23 DIAGNOSIS — R32 URINARY AND FECAL INCONTINENCE: ICD-10-CM

## 2025-07-23 DIAGNOSIS — E78.5 HYPERLIPIDEMIA, UNSPECIFIED HYPERLIPIDEMIA TYPE: ICD-10-CM

## 2025-07-23 DIAGNOSIS — J96.21 ACUTE ON CHRONIC RESPIRATORY FAILURE WITH HYPOXIA AND HYPERCAPNIA (H): ICD-10-CM

## 2025-07-23 PROCEDURE — 99316 NF DSCHRG MGMT 30 MIN+: CPT | Performed by: NURSE PRACTITIONER

## 2025-07-23 NOTE — LETTER
7/23/2025      Marly Cannon  [address]  [facility] [address]        [facility] GERIATRICS DISCHARGE SUMMARY  PATIENT'S NAME: Marly Cannon  YOB: 1965  MEDICAL RECORD NUMBER:  [id number]  Place of Service where encounter took place:  [facility] (TCU)(SNF) [[id number]]    PRIMARY CARE PROVIDER AND CLINIC RESPONSIBLE AFTER TRANSFER:   To establish PCP services on site at [facility] in [facility]     Transferring providers: BLAIR Ramirez CNP, Dr. eLxus Sims MD  Recent Hospitalization/ED:  [facility] (TCU) stay 5/9/25-.6/13/25 [facility] . Hospital stay 4/11/25 through 5/9/25..   Date of SNF Admission: June 13, 2025  Date of SNF (anticipated) Discharge: July 24, 2025  Discharged to:[facility]: [facility] [place], [place]  Cognitive Scores: CPT: 4.7/5.6  Physical Function: Ambulating 100 ft with walker  DME: SNF  coordinating DME needs     CODE STATUS/ADVANCE DIRECTIVES DISCUSSION:  Full Code   ALLERGIES: Patient has no known allergies.    NURSING FACILITY COURSE   Medication Changes/Rationale:   See below    Summary of nursing facility stay:   Physical deconditioning  Generalized muscle weakness  Unable to care for self  Intellectual delay  Comment: Chronic. S/T prolonged hospitalization. Per therapy- Patient requires set up for UB needs, Supv for LB needs and toileting. Ambulates up to 200ft 4WW SBA. CPT 4.7/5.6. Transitioned to LTC on site pending relocation to [facility] in [facility] [place]  Plan:  -SW to remain involved for safe discharge planning needs  -She was accepted to [facility] in [place].   -Will be discharging soon with services on 7/24/25     Acute on Chronic Hypoxic Respiratory Failure  History of VAP  COPD  ADEEL  PE, Acute, segmental, subsegmental  Pulmonary emphysema  Comment: Acute on chronic. Admitted to [facility] on  3/22/25: She had a repeat CTA that demonstrated progression of her PE and RLL collapse concerning for mucous plugging and pneumonia. Primary team has been having issues w/ progressively increasing FiO2 needs. She had a sputum culture grow Candida & has been on fluconazole since 3/25. Transferred to [facility] d/t concerns that she might require a tracheostomy secondary to her inability to liberate from the vent. At [facility] patient was admitted to the ICU on a ventilator, successfully extubated 4/1 and downgraded to floor status 4/3. Infectious disease consulted and continued patient on Zosyn course to be completed through 4/17/2025. Patient was able to liberate from ventilator withOUT tracheostomy formation.   Plan:  -Monitor respiratory status  -Oxygen 2L via oxymask overnights.   -Would recommend she follows up with sleep medicine in future  -Encourage incentive spirometry every 4 hours while awake  -Continue apixaban 5mg BID  -Continue albuterol inhaler PRN  -Continue umeclidinium-vilanterol (anoro ellipta) 62.5-25mcg inhaler daily  -Recent labs stable. Trend with new PCP post discharge     Perineal Abscess--RESOLVED  Condyloma Acuminata---RESOLVED  Comment: Acute on chronic. Per recent hospitalization-General surgery consulted for right keiry/perineal abscess and performed incision and drainage with Penrose drain placement 3/31 with subsequent repeat I&D 4/2 and 4/8. s/p acyclovir and I&D x3. Wound is now healed. All wound cares were discontinued  Plan:  -Monitor for worsening s/symptoms of concerns  -Follow up with wound clinic as directed PRN  -Monitor pain complaints  -Continue Tylenol PRN  -Recent labs stable. Trend with new PCP post discharge     Seborrheic dermatitis:   Comment: Acute on chronic. Dermatitis acute flare noted to central face.   Plan:  -Monitor for worsening s/symptoms of concerns  -Dermatology PRN if symptoms worsen  -Continue triamcinolone BID to face until skin  resolves.   -Continue ketoconazole 2% shampoo- apply to scalp and body during showers     T2DM  Comment: Chronic. Poorly controlled as outpatient with A1c 12.7 in march 2025. Now recently 8% on 6/12/25. Used to be on insulin pump. No longer on it due to intellectual delay. Not able to complete own Blood Glucose monitoring along with insulin administration needs despite education, therefore [facility] facility staff will need to perform for her  Plan:  -Continue Blood Glucose QID using Freestyle Bud 2 device. Change device every 14 days and PRN. Update provider if Blood Glucose<70 and.or >450  -Continue jardiance 25mg daily  -Continue insulin aspart sliding scale TID with meals as directed  -Continue insulin glargine 40 units at HS. HOLD if Blood Glucose<120  -Recent labs stable. Trend with new PCP post discharge     CKD stage 3a or 3b  YADIRA  Comment: Chronic. Baseline creatinine~1.1-1.3  Plan:  -Monitor urinary status  -Monitor for worsening s/symptoms of concerns  -Recent labs stable. Trend with new PCP post discharge     Essential hypertension   Hyperlipidemia, unspecified hyperlipidemia type   Comment: Chronic. Based on JNC-8 goals,  patients age of 59 year old, presence of diabetes or CKD, and goals of care goal BP is  <140/90 mm Hg. Patient is stable with current plan of care and routine assessment..  Plan:  -Monitor BP and HR as directed  -Continue amlodipine 5mg daily. HOLD if SBP<100  -Continue atorvastatin 40mg daily  -Continue apixaban 5mg BID  -Recent labs stable. Trend with new PCP post discharge     Reactive thrombocytosis  Comment: Chronic. Platelets historically in normal range (reviewed last 3 years). Peaked at 700,000, now resolved--Likely  reactive - reviewed flow chart for acute thrombocytosis, posted in note. Normal peripheral smear, liver enzymes  Plan;  -Monitor bleeding risks  -Monitor for worsening s/symptoms of concerns  -Recent labs stable. Trend with new PCP post discharge    Discharge  Medications:  MED REC REQUIRED  Post Medication Reconciliation Status:  Discharge medications reconciled and changed, see notes/orders     Current Outpatient Medications   Medication Sig Dispense Refill     acetaminophen (TYLENOL) 325 MG tablet Take 2 tablets (650 mg) by mouth every 6 hours as needed for mild pain or fever. 30 tablet 0     albuterol (PROAIR HFA/PROVENTIL HFA/VENTOLIN HFA) 108 (90 Base) MCG/ACT inhaler Inhale 2 puffs into the lungs every 4 hours as needed for cough, wheezing or shortness of breath. 18 g 0     amLODIPine (NORVASC) 5 MG tablet Take 1 tablet (5 mg) by mouth daily. 30 tablet 0     apixaban ANTICOAGULANT (ELIQUIS) 5 MG tablet Take 1 tablet (5 mg) by mouth 2 times daily. 60 tablet 0     atorvastatin (LIPITOR) 40 MG tablet Take 1 tablet (40 mg) by mouth at bedtime. 30 tablet 0     Cholecalciferol (VITAMIN D3) 50 MCG (2000 UT) CAPS Take 1 capsule by mouth daily. 30 capsule 0     Continuous Glucose Sensor (FREESTYLE HANK 2 SENSOR) MISC 1 each every 14 days. Use 1 sensor every 14 days. Use to read blood sugars per 's instructions. 6 each 0     empagliflozin (JARDIANCE) 25 MG TABS tablet Take 1 tablet (25 mg) by mouth daily. 30 tablet 0     insulin aspart (NOVOLOG PEN) 100 UNIT/ML pen Inject 1-7 Units subcutaneously 3 times daily (with meals). Inject as per sliding scale: if 140 - 189 = 1; 190 - 239 = 2; 240 - 289 = 3; 290 - 339 = 4; 340 - 389 = 5; 390 - 439 = 6; 440+ = 7 15 mL 0     insulin glargine (LANTUS PEN) 100 UNIT/ML pen Inject 40 Units subcutaneously at bedtime. HOLD if Blood Glucose<120 12 mL 0     ketoconazole (NIZORAL) 2 % external shampoo Apply to scalp and body on shower days 120 mL 0     multivitamin w/minerals (THERA-VIT-M) tablet Take 1 tablet by mouth daily. 30 tablet 0     ondansetron (ZOFRAN ODT) 4 MG ODT tab Take 1 tablet (4 mg) by mouth every 6 hours as needed for vomiting or nausea. 30 tablet 0     polyethylene glycol (MIRALAX) 17 GM/Dose powder Take 17 g by  mouth daily. 510 g 0     sennosides (SENOKOT) 8.6 MG tablet Take 1 tablet by mouth 2 times daily as needed for constipation. 30 tablet 0     triamcinolone (KENALOG) 0.1 % external cream Apply to face BID until skin resolves 80 g 0     umeclidinium-vilanterol (ANORO ELLIPTA) 62.5-25 MCG/ACT oral inhaler Inhale 1 puff into the lungs daily. 60 each 0      Controlled medications:   not applicable/none     Past Medical History:   Past Medical History:   Diagnosis Date     ACP (advance care planning) 09/09/2016    Advance Care Planning 9/9/2016: ACP Review of Chart / Resources Provided:  Reviewed chart for advance care plan.  Marly Cannon has been provided information and resources to begin or update their advance care plan.  Added by Naty Allen                Acute on chronic respiratory failure with hypoxia and hypercapnia (H) 03/22/2025     Acute respiratory failure with hypoxia (H) 03/31/2025     Breast fibrocystic disorder 02/22/2008    Overview:   IMO Update 10/11       Callus of foot 05/29/2019     COPD (chronic obstructive pulmonary disease) (H)      Diabetes (H)      Diabetic ketosis (H) 03/18/2025     Diabetic polyneuropathy associated with diabetes mellitus due to underlying condition (H) 01/17/2023     Essential hypertension 05/28/2019     [place] cardiac risk <10% in next 10 years 02/22/2019    Overview:   Started high intensity statin.        H/O colonoscopy 04/24/2019    Had in 10/2018, due for repeat 5 years       Hyperglycemia 03/22/2025     Hyperlipidemia, unspecified hyperlipidemia type 05/28/2019     Hyperparathyroidism due to renal insufficiency 06/14/2016     Hypertension      Intellectual disability 08/01/2017     Memory disturbance 02/13/2020     Microalbuminuria due to type 2 diabetes mellitus (H) 06/14/2016     ADEEL (obstructive sleep apnea) 02/16/2021    Interp for PSG dated 12/16/2020.     Weight 202 lbs.  Total sleep time 417.5 minutes, sleep efficiency 83%, sleep latency 15  "minutes, REM latency - minutes.  Arousal index 45.6.  Sleep architecture was incredibly fragmented with no clear REM observed.     AHI 43.1, events appearing primarily obstructive in nature.  Mean Spo2 86%, isabel SpO2 78%, 96.2% of recording was <= 89%.       EKG appeared NS     PAF (paroxysmal atrial fibrillation) (H) 03/22/2025     Pneumonia 03/22/2025     Pulmonary emphysema (H) 08/21/2015    Confirmed via PFTs in 8/2015       Stage 3 chronic kidney disease (H) 02/12/2016    Overview:   Updated per 10/1/17 IMO import       Tobacco abuse 07/09/2015     Tremor 11/09/2009    Formatting of this note might be different from the original.  Shakes head       Tubular adenoma of colon 09/28/2018     Type 2 diabetes, HbA1c goal < 7% (H) 07/24/2015     Unable to care for self 03/18/2025     Urinary incontinence, unspecified type 01/17/2023     Vitamin D deficiency 06/14/2016     Physical Exam:   Vitals: /86   Pulse 74   Temp 98.1  F (36.7  C)   Resp 18   Ht 1.651 m (5' 5\")   Wt 83.6 kg (184 lb 3.2 oz)   SpO2 98%   BMI 30.65 kg/m    BMI: Body mass index is 30.65 kg/m .  GENERAL APPEARANCE:  Alert, in no distress, cooperative  ENT:  Mouth and posterior oropharynx normal, moist mucous membranes, [place]  EYES:  EOM, conjunctivae, lids, pupils and irises normal  RESP:  respiratory effort and palpation of chest normal, lungs clear to auscultation , no respiratory distress  CV:  regular rate and rhythm, no murmur, rub, or gallop, peripheral edema 1+ in BLE  ABDOMEN:  normal bowel sounds, soft, nontender, no hepatosplenomegaly or other masses, no guarding or rebound  M/S:   Ambulates with walker  SKIN:  Inspection of skin and subcutaneous tissue baseline, mild dermatitis noted to central face, but slowly improving with regimen  NEURO:   Cranial nerves 2-12 are normal tested and grossly at patient's baseline, no purposeful movement in upper and lower extremities  PSYCH:  oriented X 3, memory impaired , affect and mood " normal     SNF labs: Most Recent 3 CBC's:  Recent Labs   Lab Test 07/18/25  1118 06/12/25  0925 05/29/25  0840   WBC 9.3 10.0 9.4   HGB 14.2 13.1 13.2   MCV 90 91 93    350 356     Most Recent 3 BMP's:  Recent Labs   Lab Test 07/18/25  1118 06/12/25  0925 05/29/25  0840    138 138   POTASSIUM 4.5 4.3 4.6   CHLORIDE 101 100 101   CO2 25 23 26   BUN 39.1* 34.4* 34.8*   CR 1.34* 1.43* 1.46*   ANIONGAP 14 15 11   NORM 10.0 10.0 10.2   * 263* 182*     Most Recent 2 LFT's:  Recent Labs   Lab Test 07/18/25  1118 05/29/25  0840   AST 21 21   ALT 10 15   ALKPHOS 84 88   BILITOTAL 0.5 0.6     Most Recent Hemoglobin A1c:  Recent Labs   Lab Test 06/12/25  0925   A1C 8.0*     Most Recent Anemia Panel:  Recent Labs   Lab Test 07/18/25  1118 05/21/25  1152 05/15/25  0850 04/23/25  0623 04/21/25  0814 04/07/25  0542 04/06/25  0431 04/05/25  0451 04/19/23  1510 01/17/23  1505   WBC 9.3   < > 10.6   < > 9.7   < > 7.3 7.7   < > 10.1   HGB 14.2   < > 12.5   < > 11.2*   < > 10.1* 9.9*   < > 17.1*   HCT 43.7   < > 39.2   < > 34.8*   < > 31.2* 32.5*   < > 50.1*   MCV 90   < > 94   < > 96   < > 94 96   < > 88      < > 425   < > 650*   < > 282 292   < > 263   IRON  --   --   --   --   --   --   --  51  --   --    IRONSAT  --   --   --   --   --   --   --  27  --   --    RETICABSCT  --   --   --   --  0.116*  --   --   --   --   --    RETP  --   --   --   --  3.2*  --   --   --   --   --    FEB  --   --   --   --   --   --   --  188*  --   --    OLIVA  --   --   --   --   --   --   --  407*  --   --    B12  --   --  559  --   --   --   --  859   < >  --    FOLIC  --   --   --   --   --   --  4.9  --   --   --    EPOE  --   --   --   --   --   --   --   --   --  7    < > = values in this interval not displayed.       DISCHARGE PLAN:  Follow up labs: No labs orders/due  Medical Follow Up:      Follow up with primary care provider in 1 weeks  [facility] scheduled appointments:  Appointments in Next Year    No  future appts.    Discharge Services: Home Care:  Occupational Therapy, Physical Therapy, and Registered Nurse  Discharge Instructions Verbalized to Patient at Discharge:   Weight bearing restrictions:  Weight bearing as tolerated.   Monitor blood glucose monitoring 4 times a day. Keep a record and bring it to your next primary provider visit.   Continue to follow your diet:  Regular diet, Level 7-Easy to Chew texture, Thin Liquids Level 0 consistency.   O2 at 3 Lpm by Oxymask overnights while sleeping  Driving is not recommended   24-hour supervision is recommended for safety.     TOTAL DISCHARGE TIME:   32 minutes  Electronically signed by:  Dr. Jayshree Montenegro DNP, APRN, FNP-C, WCS-C, EDS-C      Documentation of Face to Face and Certification for Home Health Services    I certify that patient: Marly Cannon is under my care and that I, or a nurse practitioner or physician's assistant working with me, had a face-to-face encounter that meets the physician face-to-face encounter requirements with this patient on: 7/23/2025.    This encounter with the patient was in whole, or in part, for the following medical condition, which is the primary reason for home health care: The primary encounter diagnosis was Reactive thrombocytosis. Diagnoses of Hyperlipidemia, unspecified hyperlipidemia type, Essential hypertension, YADIRA (acute kidney injury), Stage 3 chronic kidney disease, unspecified whether stage 3a or 3b CKD (H), Type 2 diabetes mellitus with hyperglycemia, with long-term current use of insulin (H), Seborrheic dermatitis, Cognitive impairment, Intellectual delay, Physical deconditioning, Generalized muscle weakness, Acute on chronic respiratory failure with hypoxia and hypercapnia (H), Chronic obstructive pulmonary disease with acute exacerbation (H), ADEEL (obstructive sleep apnea), Perineal abscess, Condyloma acuminata, Diabetic polyneuropathy associated with diabetes mellitus due to underlying condition (H),  History of pulmonary embolism, PAF (paroxysmal atrial fibrillation) (H), History of pneumonia, Pulmonary emphysema, unspecified emphysema type (H), Unable to care for self, Urinary and fecal incontinence, Slow transit constipation, Open wound, Type 2 diabetes, HbA1c goal < 7% (H), and Vitamin D deficiency were also pertinent to this visit..    I certify that, based on my findings, the following services are medically necessary home health services: Nursing, Occupational Therapy, and Physical Therapy.    My clinical findings support the need for the above services because: Nurse is needed: To assess medication management, education after changes in medications or other medical regimen.., Occupational Therapy Services are needed to assess and treat cognitive ability and address ADL safety due to impairment in deconditioning., and Physical Therapy Services are needed to assess and treat the following functional impairments: deconditioning.    Further, I certify that my clinical findings support that this patient is homebound (i.e. absences from home require considerable and taxing effort and are for medical reasons or Sabian[facility] services or infrequently or of short duration when for other reasons) because: Requires assistance of another person or specialized equipment to access medical services because patient: Is unable to walk greater than 100 feet without rest. and Requires supervision of another for safe transfer...    Based on the above findings. I certify that this patient is confined to the home and needs intermittent skilled nursing care, physical therapy and/or speech therapy.  The patient is under my care, and I have initiated the establishment of the plan of care.  This patient will be followed by a physician who will periodically review the plan of care.  Physician/Provider to provide follow up care: To establish PCP services on site at [facility] in [place], [place]    Attending [facility] physician (the  Medicare certified PECOS provider): Dr.Emily Levi MD, signing F2F and only signing for initial order. Please send all follow up questions and concerns or needed follow up signatures to the PCP, who [facility] has on file as:  To establish PCP services on site at [facility] in [place], [place]    Physician Signature: See electronic signature associated with these discharge orders.  Date: 7/22/2025               Sincerely,        BLAIR Ramirez CNP    Electronically signed

## 2025-07-28 ENCOUNTER — TELEPHONE (OUTPATIENT)
Dept: GERIATRICS | Facility: CLINIC | Age: 60
End: 2025-07-28
Payer: COMMERCIAL

## 2025-07-28 NOTE — TELEPHONE ENCOUNTER
Received PA request from [facility] for Continuous Glucose Sensor (FREESTYLE HANK 2 SENSOR) [facility]. Submitted on CMM, waiting for response.

## 2025-08-14 DIAGNOSIS — Z79.4 TYPE 2 DIABETES MELLITUS WITH HYPERGLYCEMIA, WITH LONG-TERM CURRENT USE OF INSULIN (H): Primary | ICD-10-CM

## 2025-08-14 DIAGNOSIS — E11.65 TYPE 2 DIABETES MELLITUS WITH HYPERGLYCEMIA, WITH LONG-TERM CURRENT USE OF INSULIN (H): Primary | ICD-10-CM

## 2025-08-14 RX ORDER — KETOROLAC TROMETHAMINE 30 MG/ML
1 INJECTION, SOLUTION INTRAMUSCULAR; INTRAVENOUS ONCE
Qty: 1 EACH | Refills: 0 | Status: SHIPPED | OUTPATIENT
Start: 2025-08-14 | End: 2025-08-14

## 2025-08-20 ENCOUNTER — LAB REQUISITION (OUTPATIENT)
Dept: LAB | Facility: HOSPITAL | Age: 60
End: 2025-08-20
Payer: COMMERCIAL

## (undated) DEVICE — BONE CLEANING TIP INTERPULSE  0210-010-000

## (undated) DEVICE — MARKER SURG SKIN 2 TIP STRL SPP99DT2AA

## (undated) DEVICE — GOWN XXL 9575

## (undated) DEVICE — PREP DYNA-HEX 4% CHG SCRUB 4OZ BOTTLE MDS098710

## (undated) DEVICE — BLADE KNIFE SURG 15 371115

## (undated) DEVICE — SOL WATER IRRIG 1000ML BOTTLE 2F7114

## (undated) DEVICE — SUCTION IRR SYSTEM W/O TIP INTERPULSE HANDPIECE 0210-100-000

## (undated) DEVICE — CONNECTOR-ERBEFLO 2 PORT

## (undated) DEVICE — GLOVE BIOGEL PI INDICATOR 8.0 LF 41680

## (undated) DEVICE — ESU PENCIL SMOKE EVAC W/ROCKER SWITCH 0703-047-000

## (undated) DEVICE — SOL NACL 0.9% IRRIG 1000ML BOTTLE 2F7124

## (undated) DEVICE — GLOVE BIOGEL PI ULTRATOUCH G SZ 7.5 42175

## (undated) DEVICE — SU PROLENE 0 CT-1 30" 8424H

## (undated) DEVICE — VIAL DECANTER STERILE WHITE DYNJDEC06

## (undated) DEVICE — SPONGE LAP 18X18" X8435

## (undated) DEVICE — CANISTER-SUCTION 2000CC

## (undated) DEVICE — SYR BULB IRRIG DOVER 60 ML LATEX FREE 67000

## (undated) DEVICE — FORCEP-HOT BIOPSY ALLIGATOR JAW

## (undated) DEVICE — GLOVE BIOGEL PI ULTRATOUCH G SZ 8.0 42180

## (undated) DEVICE — SUCTION TIP POOLE K770

## (undated) DEVICE — ESU GROUND PAD ADULT REM W/15' CORD E7507DB

## (undated) DEVICE — SENSOR-OXISENSOR II ADULT

## (undated) DEVICE — DRAIN PENROSE 3/8"X12" LATEX 30414-038

## (undated) DEVICE — NEEDLE HYPO MAGELLAN SAFETY 22GA 1 1/2IN 8881850215

## (undated) DEVICE — DRSG ABD TNDRSRB WET PRUF 8IN X 10IN STRL  9194A

## (undated) DEVICE — SU SILK 2-0 FS-1 18" 685G

## (undated) DEVICE — IRRIGATION-H2O 1000ML

## (undated) DEVICE — GOWN IMPERVIOUS BREATHABLE SMART XLG 89045

## (undated) DEVICE — DRSG GAUZE 4X4" TRAY 6939

## (undated) DEVICE — CUSTOM PACK PERI GYN SMA5BPGHEA

## (undated) DEVICE — TUBING-SUCTION 20FT

## (undated) DEVICE — CAUTERY PAD-POLYHESIVE II ADULT

## (undated) DEVICE — SYR 10ML FINGER CONTROL W/O NDL 309695

## (undated) DEVICE — SUCTION MANIFOLD NEPTUNE 2 SYS 1 PORT 702-025-000

## (undated) RX ORDER — DEXAMETHASONE SODIUM PHOSPHATE 10 MG/ML
INJECTION, SOLUTION INTRAMUSCULAR; INTRAVENOUS
Status: DISPENSED
Start: 2025-04-08

## (undated) RX ORDER — BUPIVACAINE HYDROCHLORIDE AND EPINEPHRINE 2.5; 5 MG/ML; UG/ML
INJECTION, SOLUTION INFILTRATION; PERINEURAL
Status: DISPENSED
Start: 2025-04-08

## (undated) RX ORDER — ONDANSETRON 2 MG/ML
INJECTION INTRAMUSCULAR; INTRAVENOUS
Status: DISPENSED
Start: 2025-04-08

## (undated) RX ORDER — FENTANYL CITRATE 50 UG/ML
INJECTION, SOLUTION INTRAMUSCULAR; INTRAVENOUS
Status: DISPENSED
Start: 2025-04-02

## (undated) RX ORDER — FENTANYL CITRATE 50 UG/ML
INJECTION, SOLUTION INTRAMUSCULAR; INTRAVENOUS
Status: DISPENSED
Start: 2025-04-08

## (undated) RX ORDER — PROPOFOL 10 MG/ML
INJECTION, EMULSION INTRAVENOUS
Status: DISPENSED
Start: 2018-09-21

## (undated) RX ORDER — GINSENG 100 MG
CAPSULE ORAL
Status: DISPENSED
Start: 2025-04-02

## (undated) RX ORDER — LIDOCAINE HYDROCHLORIDE 10 MG/ML
INJECTION, SOLUTION EPIDURAL; INFILTRATION; INTRACAUDAL; PERINEURAL
Status: DISPENSED
Start: 2025-04-08

## (undated) RX ORDER — GINSENG 100 MG
CAPSULE ORAL
Status: DISPENSED
Start: 2025-04-08

## (undated) RX ORDER — BUPIVACAINE HYDROCHLORIDE AND EPINEPHRINE 2.5; 5 MG/ML; UG/ML
INJECTION, SOLUTION INFILTRATION; PERINEURAL
Status: DISPENSED
Start: 2025-04-02

## (undated) RX ORDER — PROPOFOL 10 MG/ML
INJECTION, EMULSION INTRAVENOUS
Status: DISPENSED
Start: 2025-04-08